# Patient Record
Sex: MALE | Race: ASIAN | NOT HISPANIC OR LATINO | Employment: STUDENT | URBAN - METROPOLITAN AREA
[De-identification: names, ages, dates, MRNs, and addresses within clinical notes are randomized per-mention and may not be internally consistent; named-entity substitution may affect disease eponyms.]

---
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
## 2022-05-27 ENCOUNTER — HOSPITAL ENCOUNTER (INPATIENT)
Facility: MEDICAL CENTER | Age: 22
LOS: 8 days | DRG: 834 | End: 2022-06-04
Admitting: HOSPITALIST
Payer: COMMERCIAL

## 2022-05-27 DIAGNOSIS — T45.1X5A CHEMOTHERAPY INDUCED NAUSEA AND VOMITING: ICD-10-CM

## 2022-05-27 DIAGNOSIS — C91.00 PRE B-CELL ACUTE LYMPHOBLASTIC LEUKEMIA (ALL) (HCC): ICD-10-CM

## 2022-05-27 DIAGNOSIS — I26.99 PE (PULMONARY THROMBOEMBOLISM) (HCC): ICD-10-CM

## 2022-05-27 DIAGNOSIS — C91.00 ACUTE LYMPHOBLASTIC LEUKEMIA IN ADULT (HCC): ICD-10-CM

## 2022-05-27 DIAGNOSIS — R11.2 CHEMOTHERAPY INDUCED NAUSEA AND VOMITING: ICD-10-CM

## 2022-05-27 PROBLEM — I82.890 SUPERFICIAL VEIN THROMBOSIS: Status: ACTIVE | Noted: 2022-05-27

## 2022-05-27 PROBLEM — E79.0 ELEVATED BLOOD URIC ACID LEVEL: Status: ACTIVE | Noted: 2022-05-27

## 2022-05-27 PROBLEM — M79.604 RIGHT LEG PAIN: Status: ACTIVE | Noted: 2022-05-27

## 2022-05-27 PROBLEM — M54.9 BACK PAIN: Status: ACTIVE | Noted: 2022-05-27

## 2022-05-27 LAB
ALBUMIN SERPL BCP-MCNC: 4.5 G/DL (ref 3.2–4.9)
ALBUMIN/GLOB SERPL: 1.7 G/DL
ALP SERPL-CCNC: 139 U/L (ref 30–99)
ALT SERPL-CCNC: 17 U/L (ref 2–50)
ANION GAP SERPL CALC-SCNC: 14 MMOL/L (ref 7–16)
APTT PPP: 35 SEC (ref 24.7–36)
AST SERPL-CCNC: 30 U/L (ref 12–45)
BASOPHILS # BLD AUTO: 0 % (ref 0–1.8)
BASOPHILS # BLD: 0 K/UL (ref 0–0.12)
BILIRUB SERPL-MCNC: 0.5 MG/DL (ref 0.1–1.5)
BUN SERPL-MCNC: 20 MG/DL (ref 8–22)
CALCIUM SERPL-MCNC: 9.4 MG/DL (ref 8.5–10.5)
CHLORIDE SERPL-SCNC: 100 MMOL/L (ref 96–112)
CO2 SERPL-SCNC: 24 MMOL/L (ref 20–33)
CREAT SERPL-MCNC: 1.35 MG/DL (ref 0.5–1.4)
EOSINOPHIL # BLD AUTO: 0 K/UL (ref 0–0.51)
EOSINOPHIL NFR BLD: 0 % (ref 0–6.9)
ERYTHROCYTE [DISTWIDTH] IN BLOOD BY AUTOMATED COUNT: 53.1 FL (ref 35.9–50)
FIBRINOGEN PPP-MCNC: 386 MG/DL (ref 215–460)
GFR SERPLBLD CREATININE-BSD FMLA CKD-EPI: 77 ML/MIN/1.73 M 2
GLOBULIN SER CALC-MCNC: 2.7 G/DL (ref 1.9–3.5)
GLUCOSE SERPL-MCNC: 107 MG/DL (ref 65–99)
HCT VFR BLD AUTO: 32.5 % (ref 42–52)
HGB BLD-MCNC: 10 G/DL (ref 14–18)
INR PPP: 1.37 (ref 0.87–1.13)
LDH SERPL L TO P-CCNC: 1114 U/L (ref 107–266)
LYMPHOCYTES # BLD AUTO: 75.31 K/UL (ref 1–4.8)
LYMPHOCYTES NFR BLD: 16.5 % (ref 22–41)
MAGNESIUM SERPL-MCNC: 2.4 MG/DL (ref 1.5–2.5)
MANUAL DIFF BLD: NORMAL
MCH RBC QN AUTO: 29 PG (ref 27–33)
MCHC RBC AUTO-ENTMCNC: 30.8 G/DL (ref 33.7–35.3)
MCV RBC AUTO: 94.2 FL (ref 81.4–97.8)
MONOCYTES # BLD AUTO: 17.34 K/UL (ref 0–0.85)
MONOCYTES NFR BLD AUTO: 3.8 % (ref 0–13.4)
MORPHOLOGY BLD-IMP: NORMAL
MYELOCYTES NFR BLD MANUAL: 0.8 %
NEUTROPHILS # BLD AUTO: 3.19 K/UL (ref 1.82–7.42)
NEUTROPHILS NFR BLD: 0.7 % (ref 44–72)
NRBC # BLD AUTO: 0.26 K/UL
NRBC BLD-RTO: 0.1 /100 WBC
PHOSPHATE SERPL-MCNC: 5.6 MG/DL (ref 2.5–4.5)
PLATELET # BLD AUTO: 53 K/UL (ref 164–446)
PLATELET BLD QL SMEAR: NORMAL
PMV BLD AUTO: 10.2 FL (ref 9–12.9)
POTASSIUM SERPL-SCNC: 3.6 MMOL/L (ref 3.6–5.5)
PROMYELOCYTES NFR BLD MANUAL: 0.8 %
PROT SERPL-MCNC: 7.2 G/DL (ref 6–8.2)
PROTHROMBIN TIME: 16.4 SEC (ref 12–14.6)
RBC # BLD AUTO: 3.45 M/UL (ref 4.7–6.1)
RBC BLD AUTO: NORMAL
SODIUM SERPL-SCNC: 138 MMOL/L (ref 135–145)
URATE SERPL-MCNC: 15.6 MG/DL (ref 2.5–8.3)
WBC # BLD AUTO: >440 K/UL (ref 4.8–10.8)
WBC OTHER NFR BLD MANUAL: 77.4 %

## 2022-05-27 PROCEDURE — 36415 COLL VENOUS BLD VENIPUNCTURE: CPT

## 2022-05-27 PROCEDURE — 85384 FIBRINOGEN ACTIVITY: CPT

## 2022-05-27 PROCEDURE — A9270 NON-COVERED ITEM OR SERVICE: HCPCS | Performed by: INTERNAL MEDICINE

## 2022-05-27 PROCEDURE — 700105 HCHG RX REV CODE 258: Performed by: INTERNAL MEDICINE

## 2022-05-27 PROCEDURE — 80053 COMPREHEN METABOLIC PANEL: CPT

## 2022-05-27 PROCEDURE — 85007 BL SMEAR W/DIFF WBC COUNT: CPT

## 2022-05-27 PROCEDURE — 700102 HCHG RX REV CODE 250 W/ 637 OVERRIDE(OP): Performed by: INTERNAL MEDICINE

## 2022-05-27 PROCEDURE — 80503 PATH CLIN CONSLTJ SF 5-20: CPT

## 2022-05-27 PROCEDURE — 85025 COMPLETE CBC W/AUTO DIFF WBC: CPT

## 2022-05-27 PROCEDURE — 83615 LACTATE (LD) (LDH) ENZYME: CPT

## 2022-05-27 PROCEDURE — 700111 HCHG RX REV CODE 636 W/ 250 OVERRIDE (IP): Performed by: INTERNAL MEDICINE

## 2022-05-27 PROCEDURE — 84550 ASSAY OF BLOOD/URIC ACID: CPT

## 2022-05-27 PROCEDURE — 770004 HCHG ROOM/CARE - ONCOLOGY PRIVATE *

## 2022-05-27 PROCEDURE — 85730 THROMBOPLASTIN TIME PARTIAL: CPT

## 2022-05-27 PROCEDURE — 99223 1ST HOSP IP/OBS HIGH 75: CPT | Performed by: INTERNAL MEDICINE

## 2022-05-27 PROCEDURE — 85610 PROTHROMBIN TIME: CPT

## 2022-05-27 PROCEDURE — 83735 ASSAY OF MAGNESIUM: CPT

## 2022-05-27 PROCEDURE — 84100 ASSAY OF PHOSPHORUS: CPT

## 2022-05-27 RX ORDER — BISACODYL 10 MG
10 SUPPOSITORY, RECTAL RECTAL
Status: DISCONTINUED | OUTPATIENT
Start: 2022-05-27 | End: 2022-06-04 | Stop reason: HOSPADM

## 2022-05-27 RX ORDER — HYDROMORPHONE HYDROCHLORIDE 1 MG/ML
.2-.4 INJECTION, SOLUTION INTRAMUSCULAR; INTRAVENOUS; SUBCUTANEOUS EVERY 4 HOURS PRN
Status: DISCONTINUED | OUTPATIENT
Start: 2022-05-27 | End: 2022-05-27

## 2022-05-27 RX ORDER — PROMETHAZINE HYDROCHLORIDE 25 MG/1
12.5-25 SUPPOSITORY RECTAL EVERY 4 HOURS PRN
Status: DISCONTINUED | OUTPATIENT
Start: 2022-05-27 | End: 2022-05-30 | Stop reason: ALTCHOICE

## 2022-05-27 RX ORDER — FEBUXOSTAT 40 MG/1
120 TABLET, FILM COATED ORAL DAILY
Status: DISCONTINUED | OUTPATIENT
Start: 2022-05-27 | End: 2022-05-31

## 2022-05-27 RX ORDER — ONDANSETRON 2 MG/ML
4 INJECTION INTRAMUSCULAR; INTRAVENOUS EVERY 4 HOURS PRN
Status: DISCONTINUED | OUTPATIENT
Start: 2022-05-27 | End: 2022-05-30

## 2022-05-27 RX ORDER — HEPARIN SODIUM 5000 [USP'U]/100ML
0-30 INJECTION, SOLUTION INTRAVENOUS CONTINUOUS
Status: DISCONTINUED | OUTPATIENT
Start: 2022-05-27 | End: 2022-05-28

## 2022-05-27 RX ORDER — ONDANSETRON 4 MG/1
4 TABLET, ORALLY DISINTEGRATING ORAL EVERY 4 HOURS PRN
Status: DISCONTINUED | OUTPATIENT
Start: 2022-05-27 | End: 2022-05-30 | Stop reason: ALTCHOICE

## 2022-05-27 RX ORDER — OXYCODONE HYDROCHLORIDE 5 MG/1
2.5 TABLET ORAL
Status: DISCONTINUED | OUTPATIENT
Start: 2022-05-27 | End: 2022-06-04 | Stop reason: HOSPADM

## 2022-05-27 RX ORDER — HYDRALAZINE HYDROCHLORIDE 20 MG/ML
10 INJECTION INTRAMUSCULAR; INTRAVENOUS EVERY 4 HOURS PRN
Status: DISCONTINUED | OUTPATIENT
Start: 2022-05-27 | End: 2022-06-04 | Stop reason: HOSPADM

## 2022-05-27 RX ORDER — SODIUM CHLORIDE 9 MG/ML
INJECTION, SOLUTION INTRAVENOUS CONTINUOUS
Status: DISCONTINUED | OUTPATIENT
Start: 2022-05-27 | End: 2022-05-29

## 2022-05-27 RX ORDER — PROCHLORPERAZINE EDISYLATE 5 MG/ML
5-10 INJECTION INTRAMUSCULAR; INTRAVENOUS EVERY 4 HOURS PRN
Status: DISCONTINUED | OUTPATIENT
Start: 2022-05-27 | End: 2022-05-30 | Stop reason: ALTCHOICE

## 2022-05-27 RX ORDER — AMOXICILLIN 250 MG
2 CAPSULE ORAL 2 TIMES DAILY
Status: DISCONTINUED | OUTPATIENT
Start: 2022-05-27 | End: 2022-06-04 | Stop reason: HOSPADM

## 2022-05-27 RX ORDER — HYDROXYUREA 500 MG/1
1000 CAPSULE ORAL 3 TIMES DAILY
Status: DISCONTINUED | OUTPATIENT
Start: 2022-05-27 | End: 2022-05-29

## 2022-05-27 RX ORDER — HYDROMORPHONE HYDROCHLORIDE 1 MG/ML
0.25 INJECTION, SOLUTION INTRAMUSCULAR; INTRAVENOUS; SUBCUTANEOUS
Status: DISCONTINUED | OUTPATIENT
Start: 2022-05-27 | End: 2022-06-04 | Stop reason: HOSPADM

## 2022-05-27 RX ORDER — OXYCODONE HYDROCHLORIDE 5 MG/1
5 TABLET ORAL
Status: DISCONTINUED | OUTPATIENT
Start: 2022-05-27 | End: 2022-06-04 | Stop reason: HOSPADM

## 2022-05-27 RX ORDER — SODIUM CHLORIDE 9 MG/ML
INJECTION, SOLUTION INTRAVENOUS CONTINUOUS
Status: ACTIVE | OUTPATIENT
Start: 2022-05-27 | End: 2022-05-28

## 2022-05-27 RX ORDER — PROMETHAZINE HYDROCHLORIDE 25 MG/1
12.5-25 TABLET ORAL EVERY 4 HOURS PRN
Status: DISCONTINUED | OUTPATIENT
Start: 2022-05-27 | End: 2022-05-30 | Stop reason: ALTCHOICE

## 2022-05-27 RX ORDER — HEPARIN SODIUM 5000 [USP'U]/100ML
0-30 INJECTION, SOLUTION INTRAVENOUS CONTINUOUS
Status: CANCELLED | OUTPATIENT
Start: 2022-05-28

## 2022-05-27 RX ORDER — POLYETHYLENE GLYCOL 3350 17 G/17G
1 POWDER, FOR SOLUTION ORAL
Status: DISCONTINUED | OUTPATIENT
Start: 2022-05-27 | End: 2022-06-04 | Stop reason: HOSPADM

## 2022-05-27 RX ORDER — HYDROMORPHONE HYDROCHLORIDE 1 MG/ML
0.4 INJECTION, SOLUTION INTRAMUSCULAR; INTRAVENOUS; SUBCUTANEOUS ONCE
Status: DISCONTINUED | OUTPATIENT
Start: 2022-05-27 | End: 2022-05-27

## 2022-05-27 RX ADMIN — SODIUM CHLORIDE 3 MG: 9 INJECTION, SOLUTION INTRAVENOUS at 22:06

## 2022-05-27 RX ADMIN — FEBUXOSTAT 120 MG: 40 TABLET, FILM COATED ORAL at 22:52

## 2022-05-27 RX ADMIN — SODIUM CHLORIDE: 9 INJECTION, SOLUTION INTRAVENOUS at 22:06

## 2022-05-27 RX ADMIN — OXYCODONE 2.5 MG: 5 TABLET ORAL at 22:18

## 2022-05-27 RX ADMIN — SODIUM CHLORIDE: 9 INJECTION, SOLUTION INTRAVENOUS at 19:00

## 2022-05-27 RX ADMIN — HYDROMORPHONE HYDROCHLORIDE 0.4 MG: 1 INJECTION, SOLUTION INTRAMUSCULAR; INTRAVENOUS; SUBCUTANEOUS at 18:08

## 2022-05-27 RX ADMIN — HYDROXYUREA 1000 MG: 500 CAPSULE ORAL at 23:20

## 2022-05-27 ASSESSMENT — COGNITIVE AND FUNCTIONAL STATUS - GENERAL
SUGGESTED CMS G CODE MODIFIER MOBILITY: CH
MOBILITY SCORE: 24
SUGGESTED CMS G CODE MODIFIER DAILY ACTIVITY: CH
DAILY ACTIVITIY SCORE: 24

## 2022-05-27 ASSESSMENT — PATIENT HEALTH QUESTIONNAIRE - PHQ9
SUM OF ALL RESPONSES TO PHQ9 QUESTIONS 1 AND 2: 0
1. LITTLE INTEREST OR PLEASURE IN DOING THINGS: NOT AT ALL
2. FEELING DOWN, DEPRESSED, IRRITABLE, OR HOPELESS: NOT AT ALL
9. THOUGHTS THAT YOU WOULD BE BETTER OFF DEAD, OR OF HURTING YOURSELF: NOT AT ALL
4. FEELING TIRED OR HAVING LITTLE ENERGY: NOT AT ALL
5. POOR APPETITE OR OVEREATING: NOT AT ALL
8. MOVING OR SPEAKING SO SLOWLY THAT OTHER PEOPLE COULD HAVE NOTICED. OR THE OPPOSITE, BEING SO FIGETY OR RESTLESS THAT YOU HAVE BEEN MOVING AROUND A LOT MORE THAN USUAL: NOT AT ALL
6. FEELING BAD ABOUT YOURSELF - OR THAT YOU ARE A FAILURE OR HAVE LET YOURSELF OR YOUR FAMILY DOWN: NOT AL ALL
7. TROUBLE CONCENTRATING ON THINGS, SUCH AS READING THE NEWSPAPER OR WATCHING TELEVISION: NOT AT ALL
SUM OF ALL RESPONSES TO PHQ QUESTIONS 1-9: 0
3. TROUBLE FALLING OR STAYING ASLEEP OR SLEEPING TOO MUCH: NOT AT ALL

## 2022-05-27 ASSESSMENT — LIFESTYLE VARIABLES
TOTAL SCORE: 0
HOW MANY TIMES IN THE PAST YEAR HAVE YOU HAD 5 OR MORE DRINKS IN A DAY: 0
CONSUMPTION TOTAL: NEGATIVE
EVER FELT BAD OR GUILTY ABOUT YOUR DRINKING: NO
TOTAL SCORE: 0
AVERAGE NUMBER OF DAYS PER WEEK YOU HAVE A DRINK CONTAINING ALCOHOL: 0
EVER HAD A DRINK FIRST THING IN THE MORNING TO STEADY YOUR NERVES TO GET RID OF A HANGOVER: NO
HAVE YOU EVER FELT YOU SHOULD CUT DOWN ON YOUR DRINKING: NO
ALCOHOL_USE: NO
TOTAL SCORE: 0
DOES PATIENT WANT TO STOP DRINKING: NO
ON A TYPICAL DAY WHEN YOU DRINK ALCOHOL HOW MANY DRINKS DO YOU HAVE: 0
HAVE PEOPLE ANNOYED YOU BY CRITICIZING YOUR DRINKING: NO

## 2022-05-27 ASSESSMENT — PAIN DESCRIPTION - PAIN TYPE: TYPE: ACUTE PAIN

## 2022-05-27 NOTE — LETTER
Physician Notification of Admission      To: Margarito Arvizu M.D.    [address]  [place] [address]    From: Pepe Faye M.D.    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 We strive to improve patient care by facilitating active communication with our medical colleagues from around the region.    To speak with a member of the patients care team, please contact the [facility] Pediatric department at [phone].   Thank you for allowing us to participate in the care of your patient.

## 2022-05-27 NOTE — PROGRESS NOTES
[facility] TRIAGE OFFICER DIRECT ADMISSION REPORT  Transferring facility: [facility]    Chief complaint: Left lower extremity pain and swelling  Pertinent history & patient course:   21-year-old male with no history of chronic medical problems presented with left lower extremity swelling to his PCPs office and referred to the emergency department.  Lower extremity duplex was negative for DVT.  He had low oxygen saturation 92% CTA done revealed pulmonary emboli.  CBC revealed WBC of 440,000.  Dr. Brennan from oncology consulted she thinks he has a LL and is recommending transfer to our facility for leukapheresis which is not available at the other facility.  Dr. Richards from nephrology was consulted and agrees with leukapheresis when patient arrives to our facility.  Pertinent imaging & lab results: ,000 platelets 48K serum creatinine 1.4  Further work up or recommendations per triage officer prior to transfer: Recommended placement of temporary HD catheter prior to transfer to facilitate initiation of leukapheresis upon arrival to our facility  Consultants called prior to transfer and pertinent input from consultants: Transferring provider discussed with Dr. Richards from nephrology and Dr. Melissa from oncology  Patient accepted for transfer: Yes  Consultants to be called upon arrival: Dr Richards, Dr Brennan  Admission status: Inpatient.   Floor requested: oncology      Please inform the triage officer upon arrival of the patient to [facility] for assignment of a hospitalist to perform admission.     For any question or concerns regarding the care of this patient, please reach out to the assigned hospitalist.

## 2022-05-28 ENCOUNTER — APPOINTMENT (OUTPATIENT)
Dept: RADIOLOGY | Facility: MEDICAL CENTER | Age: 22
DRG: 834 | End: 2022-05-28
Attending: NURSE PRACTITIONER
Payer: COMMERCIAL

## 2022-05-28 ENCOUNTER — APPOINTMENT (OUTPATIENT)
Dept: RADIOLOGY | Facility: MEDICAL CENTER | Age: 22
DRG: 834 | End: 2022-05-28
Attending: INTERNAL MEDICINE
Payer: COMMERCIAL

## 2022-05-28 PROBLEM — D70.9 NEUTROPENIC FEVER (HCC): Status: ACTIVE | Noted: 2022-05-28

## 2022-05-28 PROBLEM — R50.81 NEUTROPENIC FEVER (HCC): Status: ACTIVE | Noted: 2022-05-28

## 2022-05-28 PROBLEM — R00.0 TACHYCARDIA WITH HEART RATE 100-120 BEATS PER MINUTE: Status: ACTIVE | Noted: 2022-05-28

## 2022-05-28 LAB
ALBUMIN SERPL BCP-MCNC: 3.7 G/DL (ref 3.2–4.9)
ALBUMIN/GLOB SERPL: 1.5 G/DL
ALP SERPL-CCNC: 108 U/L (ref 30–99)
ALT SERPL-CCNC: 10 U/L (ref 2–50)
ANION GAP SERPL CALC-SCNC: 10 MMOL/L (ref 7–16)
ANION GAP SERPL CALC-SCNC: 16 MMOL/L (ref 7–16)
ANION GAP SERPL CALC-SCNC: 9 MMOL/L (ref 7–16)
ANION GAP SERPL CALC-SCNC: 9 MMOL/L (ref 7–16)
APPEARANCE UR: CLEAR
APTT PPP: 36.5 SEC (ref 24.7–36)
AST SERPL-CCNC: 26 U/L (ref 12–45)
BASOPHILS # BLD AUTO: 0 % (ref 0–1.8)
BASOPHILS # BLD AUTO: 0 % (ref 0–1.8)
BASOPHILS # BLD: 0 K/UL (ref 0–0.12)
BASOPHILS # BLD: 0 K/UL (ref 0–0.12)
BILIRUB SERPL-MCNC: 0.4 MG/DL (ref 0.1–1.5)
BILIRUB UR QL STRIP.AUTO: NEGATIVE
BLASTS NFR BLD MANUAL: 54.8 %
BUN SERPL-MCNC: 13 MG/DL (ref 8–22)
BUN SERPL-MCNC: 15 MG/DL (ref 8–22)
BUN SERPL-MCNC: 19 MG/DL (ref 8–22)
BUN SERPL-MCNC: 19 MG/DL (ref 8–22)
CALCIUM SERPL-MCNC: 8.2 MG/DL (ref 8.5–10.5)
CALCIUM SERPL-MCNC: 8.5 MG/DL (ref 8.5–10.5)
CALCIUM SERPL-MCNC: 8.6 MG/DL (ref 8.5–10.5)
CALCIUM SERPL-MCNC: 8.7 MG/DL (ref 8.5–10.5)
CHLORIDE SERPL-SCNC: 101 MMOL/L (ref 96–112)
CHLORIDE SERPL-SCNC: 102 MMOL/L (ref 96–112)
CHLORIDE SERPL-SCNC: 104 MMOL/L (ref 96–112)
CHLORIDE SERPL-SCNC: 107 MMOL/L (ref 96–112)
CO2 SERPL-SCNC: 18 MMOL/L (ref 20–33)
CO2 SERPL-SCNC: 22 MMOL/L (ref 20–33)
CO2 SERPL-SCNC: 23 MMOL/L (ref 20–33)
CO2 SERPL-SCNC: 23 MMOL/L (ref 20–33)
COLOR UR: YELLOW
CREAT SERPL-MCNC: 1.13 MG/DL (ref 0.5–1.4)
CREAT SERPL-MCNC: 1.19 MG/DL (ref 0.5–1.4)
CREAT SERPL-MCNC: 1.19 MG/DL (ref 0.5–1.4)
CREAT SERPL-MCNC: 1.22 MG/DL (ref 0.5–1.4)
EKG IMPRESSION: NORMAL
EOSINOPHIL # BLD AUTO: 0 K/UL (ref 0–0.51)
EOSINOPHIL # BLD AUTO: 0 K/UL (ref 0–0.51)
EOSINOPHIL NFR BLD: 0 % (ref 0–6.9)
EOSINOPHIL NFR BLD: 0 % (ref 0–6.9)
ERYTHROCYTE [DISTWIDTH] IN BLOOD BY AUTOMATED COUNT: 54.2 FL (ref 35.9–50)
ERYTHROCYTE [DISTWIDTH] IN BLOOD BY AUTOMATED COUNT: 54.8 FL (ref 35.9–50)
ERYTHROCYTE [DISTWIDTH] IN BLOOD BY AUTOMATED COUNT: 56.3 FL (ref 35.9–50)
FIBRINOGEN PPP-MCNC: 311 MG/DL (ref 215–460)
FIBRINOGEN PPP-MCNC: 330 MG/DL (ref 215–460)
GFR SERPLBLD CREATININE-BSD FMLA CKD-EPI: 87 ML/MIN/1.73 M 2
GFR SERPLBLD CREATININE-BSD FMLA CKD-EPI: 89 ML/MIN/1.73 M 2
GFR SERPLBLD CREATININE-BSD FMLA CKD-EPI: 89 ML/MIN/1.73 M 2
GFR SERPLBLD CREATININE-BSD FMLA CKD-EPI: 95 ML/MIN/1.73 M 2
GLOBULIN SER CALC-MCNC: 2.4 G/DL (ref 1.9–3.5)
GLUCOSE SERPL-MCNC: 103 MG/DL (ref 65–99)
GLUCOSE SERPL-MCNC: 111 MG/DL (ref 65–99)
GLUCOSE SERPL-MCNC: 114 MG/DL (ref 65–99)
GLUCOSE SERPL-MCNC: 96 MG/DL (ref 65–99)
GLUCOSE UR STRIP.AUTO-MCNC: NEGATIVE MG/DL
HAV IGM SERPL QL IA: NORMAL
HBV CORE IGM SER QL: NORMAL
HBV SURFACE AB SERPL IA-ACNC: <3.5 MIU/ML (ref 0–10)
HBV SURFACE AG SER QL: NORMAL
HCT VFR BLD AUTO: 28.1 % (ref 42–52)
HCT VFR BLD AUTO: 28.6 % (ref 42–52)
HCT VFR BLD AUTO: 34.7 % (ref 42–52)
HCV AB SER QL: NORMAL
HGB BLD-MCNC: 10.7 G/DL (ref 14–18)
HGB BLD-MCNC: 8.6 G/DL (ref 14–18)
HGB BLD-MCNC: 8.7 G/DL (ref 14–18)
INR PPP: 1.32 (ref 0.87–1.13)
KETONES UR STRIP.AUTO-MCNC: 15 MG/DL
LEUKOCYTE ESTERASE UR QL STRIP.AUTO: NEGATIVE
LYMPHOCYTES # BLD AUTO: 104.79 K/UL (ref 1–4.8)
LYMPHOCYTES # BLD AUTO: 147.82 K/UL (ref 1–4.8)
LYMPHOCYTES NFR BLD: 38 % (ref 22–41)
LYMPHOCYTES NFR BLD: 41.7 % (ref 22–41)
MANUAL DIFF BLD: ABNORMAL
MANUAL DIFF BLD: NORMAL
MCH RBC QN AUTO: 28.9 PG (ref 27–33)
MCH RBC QN AUTO: 29.2 PG (ref 27–33)
MCH RBC QN AUTO: 29.5 PG (ref 27–33)
MCHC RBC AUTO-ENTMCNC: 30.4 G/DL (ref 33.7–35.3)
MCHC RBC AUTO-ENTMCNC: 30.6 G/DL (ref 33.7–35.3)
MCHC RBC AUTO-ENTMCNC: 30.8 G/DL (ref 33.7–35.3)
MCV RBC AUTO: 94.3 FL (ref 81.4–97.8)
MCV RBC AUTO: 95.6 FL (ref 81.4–97.8)
MCV RBC AUTO: 96 FL (ref 81.4–97.8)
METAMYELOCYTES NFR BLD MANUAL: 0.8 %
MICRO URNS: ABNORMAL
MONOCYTES # BLD AUTO: 3.11 K/UL (ref 0–0.85)
MONOCYTES # BLD AUTO: 4.27 K/UL (ref 0–0.85)
MONOCYTES NFR BLD AUTO: 0.8 % (ref 0–13.4)
MONOCYTES NFR BLD AUTO: 1.7 % (ref 0–13.4)
MORPHOLOGY BLD-IMP: NORMAL
MORPHOLOGY BLD-IMP: NORMAL
MYELOCYTES NFR BLD MANUAL: 0.8 %
NEUTROPHILS # BLD AUTO: 0 K/UL (ref 1.82–7.42)
NEUTROPHILS # BLD AUTO: 4.52 K/UL (ref 1.82–7.42)
NEUTROPHILS NFR BLD: 0 % (ref 44–72)
NEUTROPHILS NFR BLD: 1.8 % (ref 44–72)
NITRITE UR QL STRIP.AUTO: NEGATIVE
NRBC # BLD AUTO: 0.16 K/UL
NRBC # BLD AUTO: 0.26 K/UL
NRBC BLD-RTO: 0.1 /100 WBC
NRBC BLD-RTO: 0.1 /100 WBC
PATH REV: NORMAL
PATH REV: NORMAL
PATHOLOGY CONSULT NOTE: NORMAL
PH UR STRIP.AUTO: 5.5 [PH] (ref 5–8)
PHOSPHATE SERPL-MCNC: 3.4 MG/DL (ref 2.5–4.5)
PHOSPHATE SERPL-MCNC: 3.9 MG/DL (ref 2.5–4.5)
PHOSPHATE SERPL-MCNC: 3.9 MG/DL (ref 2.5–4.5)
PLATELET # BLD AUTO: 31 K/UL (ref 164–446)
PLATELET # BLD AUTO: 41 K/UL (ref 164–446)
PLATELET # BLD AUTO: 46 K/UL (ref 164–446)
PLATELET BLD QL SMEAR: NORMAL
PLATELET BLD QL SMEAR: NORMAL
PMV BLD AUTO: 10.1 FL (ref 9–12.9)
PMV BLD AUTO: 8.3 FL (ref 9–12.9)
PMV BLD AUTO: 9 FL (ref 9–12.9)
POTASSIUM SERPL-SCNC: 4.4 MMOL/L (ref 3.6–5.5)
POTASSIUM SERPL-SCNC: 4.7 MMOL/L (ref 3.6–5.5)
POTASSIUM SERPL-SCNC: 4.8 MMOL/L (ref 3.6–5.5)
POTASSIUM SERPL-SCNC: 5 MMOL/L (ref 3.6–5.5)
PROMYELOCYTES NFR BLD MANUAL: 1.7 %
PROT SERPL-MCNC: 6.1 G/DL (ref 6–8.2)
PROT UR QL STRIP: NEGATIVE MG/DL
PROTHROMBIN TIME: 16 SEC (ref 12–14.6)
RBC # BLD AUTO: 2.98 M/UL (ref 4.7–6.1)
RBC # BLD AUTO: 2.98 M/UL (ref 4.7–6.1)
RBC # BLD AUTO: 3.63 M/UL (ref 4.7–6.1)
RBC BLD AUTO: NORMAL
RBC BLD AUTO: NORMAL
RBC UR QL AUTO: NEGATIVE
SODIUM SERPL-SCNC: 134 MMOL/L (ref 135–145)
SODIUM SERPL-SCNC: 134 MMOL/L (ref 135–145)
SODIUM SERPL-SCNC: 138 MMOL/L (ref 135–145)
SODIUM SERPL-SCNC: 138 MMOL/L (ref 135–145)
SP GR UR STRIP.AUTO: 1.01
UFH PPP CHRO-ACNC: <0.1 IU/ML
UFH PPP CHRO-ACNC: <0.1 IU/ML
URATE SERPL-MCNC: 3.4 MG/DL (ref 2.5–8.3)
URATE SERPL-MCNC: 5.2 MG/DL (ref 2.5–8.3)
UROBILINOGEN UR STRIP.AUTO-MCNC: 0.2 MG/DL
WBC # BLD AUTO: 251.3 K/UL (ref 4.8–10.8)
WBC # BLD AUTO: 389 K/UL (ref 4.8–10.8)
WBC # BLD AUTO: 409.5 K/UL (ref 4.8–10.8)
WBC OTHER NFR BLD MANUAL: 57.9 %

## 2022-05-28 PROCEDURE — 71045 X-RAY EXAM CHEST 1 VIEW: CPT

## 2022-05-28 PROCEDURE — 36415 COLL VENOUS BLD VENIPUNCTURE: CPT

## 2022-05-28 PROCEDURE — 85730 THROMBOPLASTIN TIME PARTIAL: CPT

## 2022-05-28 PROCEDURE — 88377 M/PHMTRC ALYS ISHQUANT/SEMIQ: CPT

## 2022-05-28 PROCEDURE — 88185 FLOWCYTOMETRY/TC ADD-ON: CPT | Mod: 91

## 2022-05-28 PROCEDURE — 87040 BLOOD CULTURE FOR BACTERIA: CPT

## 2022-05-28 PROCEDURE — C1769 GUIDE WIRE: HCPCS

## 2022-05-28 PROCEDURE — 84550 ASSAY OF BLOOD/URIC ACID: CPT | Mod: 91

## 2022-05-28 PROCEDURE — 700111 HCHG RX REV CODE 636 W/ 250 OVERRIDE (IP): Performed by: INTERNAL MEDICINE

## 2022-05-28 PROCEDURE — 36511 APHERESIS WBC: CPT

## 2022-05-28 PROCEDURE — 84100 ASSAY OF PHOSPHORUS: CPT | Mod: 91

## 2022-05-28 PROCEDURE — 6A550Z1 PHERESIS OF LEUKOCYTES, SINGLE: ICD-10-PCS | Performed by: PEDIATRICS

## 2022-05-28 PROCEDURE — 88374 M/PHMTRC ALYS ISHQUANT/SEMIQ: CPT | Mod: 91

## 2022-05-28 PROCEDURE — A9270 NON-COVERED ITEM OR SERVICE: HCPCS | Performed by: NURSE PRACTITIONER

## 2022-05-28 PROCEDURE — 81003 URINALYSIS AUTO W/O SCOPE: CPT

## 2022-05-28 PROCEDURE — 85025 COMPLETE CBC W/AUTO DIFF WBC: CPT

## 2022-05-28 PROCEDURE — 85384 FIBRINOGEN ACTIVITY: CPT | Mod: 91

## 2022-05-28 PROCEDURE — 700111 HCHG RX REV CODE 636 W/ 250 OVERRIDE (IP): Performed by: NURSE PRACTITIONER

## 2022-05-28 PROCEDURE — 700111 HCHG RX REV CODE 636 W/ 250 OVERRIDE (IP)

## 2022-05-28 PROCEDURE — 86706 HEP B SURFACE ANTIBODY: CPT

## 2022-05-28 PROCEDURE — 700105 HCHG RX REV CODE 258: Performed by: INTERNAL MEDICINE

## 2022-05-28 PROCEDURE — 93005 ELECTROCARDIOGRAM TRACING: CPT | Performed by: NURSE PRACTITIONER

## 2022-05-28 PROCEDURE — 85520 HEPARIN ASSAY: CPT | Mod: 91

## 2022-05-28 PROCEDURE — A9270 NON-COVERED ITEM OR SERVICE: HCPCS | Performed by: INTERNAL MEDICINE

## 2022-05-28 PROCEDURE — 80074 ACUTE HEPATITIS PANEL: CPT

## 2022-05-28 PROCEDURE — 700105 HCHG RX REV CODE 258: Performed by: NURSE PRACTITIONER

## 2022-05-28 PROCEDURE — 85610 PROTHROMBIN TIME: CPT

## 2022-05-28 PROCEDURE — 80048 BASIC METABOLIC PNL TOTAL CA: CPT | Mod: 91

## 2022-05-28 PROCEDURE — 770020 HCHG ROOM/CARE - TELE (206)

## 2022-05-28 PROCEDURE — 700101 HCHG RX REV CODE 250

## 2022-05-28 PROCEDURE — 700102 HCHG RX REV CODE 250 W/ 637 OVERRIDE(OP): Performed by: NURSE PRACTITIONER

## 2022-05-28 PROCEDURE — 99233 SBSQ HOSP IP/OBS HIGH 50: CPT | Performed by: NURSE PRACTITIONER

## 2022-05-28 PROCEDURE — 80053 COMPREHEN METABOLIC PANEL: CPT

## 2022-05-28 PROCEDURE — 85007 BL SMEAR W/DIFF WBC COUNT: CPT | Mod: 91

## 2022-05-28 PROCEDURE — 93010 ELECTROCARDIOGRAM REPORT: CPT | Performed by: INTERNAL MEDICINE

## 2022-05-28 PROCEDURE — 88184 FLOWCYTOMETRY/ TC 1 MARKER: CPT

## 2022-05-28 PROCEDURE — 02HV33Z INSERTION OF INFUSION DEVICE INTO SUPERIOR VENA CAVA, PERCUTANEOUS APPROACH: ICD-10-PCS | Performed by: RADIOLOGY

## 2022-05-28 PROCEDURE — 700102 HCHG RX REV CODE 250 W/ 637 OVERRIDE(OP): Performed by: INTERNAL MEDICINE

## 2022-05-28 PROCEDURE — 85027 COMPLETE CBC AUTOMATED: CPT

## 2022-05-28 RX ORDER — ACETAMINOPHEN 325 MG/1
650 TABLET ORAL EVERY 4 HOURS PRN
Status: DISCONTINUED | OUTPATIENT
Start: 2022-05-28 | End: 2022-06-04 | Stop reason: HOSPADM

## 2022-05-28 RX ORDER — ONDANSETRON 2 MG/ML
4 INJECTION INTRAMUSCULAR; INTRAVENOUS EVERY 6 HOURS PRN
Status: ACTIVE | OUTPATIENT
Start: 2022-05-28 | End: 2022-05-28

## 2022-05-28 RX ORDER — HEPARIN SODIUM (PORCINE) LOCK FLUSH IV SOLN 100 UNIT/ML 100 UNIT/ML
SOLUTION INTRAVENOUS
Status: COMPLETED
Start: 2022-05-28 | End: 2022-05-28

## 2022-05-28 RX ORDER — CALCIUM GLUCONATE 94 MG/ML
4 INJECTION, SOLUTION INTRAVENOUS ONCE
Status: COMPLETED | OUTPATIENT
Start: 2022-05-28 | End: 2022-05-28

## 2022-05-28 RX ORDER — ECHINACEA PURPUREA EXTRACT 125 MG
2 TABLET ORAL
Status: DISCONTINUED | OUTPATIENT
Start: 2022-05-28 | End: 2022-06-04 | Stop reason: HOSPADM

## 2022-05-28 RX ORDER — SODIUM CHLORIDE 9 MG/ML
500 INJECTION, SOLUTION INTRAVENOUS
Status: ACTIVE | OUTPATIENT
Start: 2022-05-28 | End: 2022-05-28

## 2022-05-28 RX ORDER — HEPARIN SODIUM 5000 [USP'U]/100ML
0-30 INJECTION, SOLUTION INTRAVENOUS CONTINUOUS
Status: DISCONTINUED | OUTPATIENT
Start: 2022-05-28 | End: 2022-05-31

## 2022-05-28 RX ORDER — MIDAZOLAM HYDROCHLORIDE 1 MG/ML
INJECTION INTRAMUSCULAR; INTRAVENOUS
Status: COMPLETED
Start: 2022-05-28 | End: 2022-05-28

## 2022-05-28 RX ORDER — MIDAZOLAM HYDROCHLORIDE 1 MG/ML
.5-2 INJECTION INTRAMUSCULAR; INTRAVENOUS PRN
Status: ACTIVE | OUTPATIENT
Start: 2022-05-28 | End: 2022-05-28

## 2022-05-28 RX ORDER — HEPARIN SODIUM 5000 [USP'U]/100ML
0-30 INJECTION, SOLUTION INTRAVENOUS CONTINUOUS
Status: DISCONTINUED | OUTPATIENT
Start: 2022-05-28 | End: 2022-05-28

## 2022-05-28 RX ORDER — DEXTROSE MONOHYDRATE, SODIUM CITRATE, AND CITRIC ACID MONOHYDRATE 2.45; 2.2; .8 G/100ML; G/100ML; G/100ML
INJECTION, SOLUTION INTRAVENOUS
Status: COMPLETED
Start: 2022-05-28 | End: 2022-05-28

## 2022-05-28 RX ADMIN — FENTANYL CITRATE 50 MCG: 50 INJECTION, SOLUTION INTRAMUSCULAR; INTRAVENOUS at 08:48

## 2022-05-28 RX ADMIN — ACETAMINOPHEN 650 MG: 325 TABLET ORAL at 18:25

## 2022-05-28 RX ADMIN — CEFEPIME 2 G: 2 INJECTION, POWDER, FOR SOLUTION INTRAVENOUS at 21:57

## 2022-05-28 RX ADMIN — OXYCODONE 5 MG: 5 TABLET ORAL at 12:35

## 2022-05-28 RX ADMIN — MIDAZOLAM HYDROCHLORIDE 1 MG: 1 INJECTION INTRAMUSCULAR; INTRAVENOUS at 08:48

## 2022-05-28 RX ADMIN — SODIUM CHLORIDE: 9 INJECTION, SOLUTION INTRAVENOUS at 02:51

## 2022-05-28 RX ADMIN — CALCIUM GLUCONATE 4 G: 98 INJECTION, SOLUTION INTRAVENOUS at 16:50

## 2022-05-28 RX ADMIN — HYDROXYUREA 1000 MG: 500 CAPSULE ORAL at 06:21

## 2022-05-28 RX ADMIN — OXYCODONE 5 MG: 5 TABLET ORAL at 02:57

## 2022-05-28 RX ADMIN — CEFEPIME 2 G: 2 INJECTION, POWDER, FOR SOLUTION INTRAVENOUS at 16:18

## 2022-05-28 RX ADMIN — OXYCODONE 5 MG: 5 TABLET ORAL at 16:18

## 2022-05-28 RX ADMIN — DEXTROSE MONOHYDRATE, SODIUM CITRATE, AND CITRIC ACID MONOHYDRATE 632 ML: 2.45; 2.2; .8 INJECTION, SOLUTION INTRAVENOUS at 15:04

## 2022-05-28 RX ADMIN — HYDROXYUREA 1000 MG: 500 CAPSULE ORAL at 16:19

## 2022-05-28 RX ADMIN — MIDAZOLAM HYDROCHLORIDE 1 MG: 1 INJECTION, SOLUTION INTRAMUSCULAR; INTRAVENOUS at 08:48

## 2022-05-28 RX ADMIN — HEPARIN SODIUM 18 UNITS/KG/HR: 5000 INJECTION, SOLUTION INTRAVENOUS at 18:25

## 2022-05-28 RX ADMIN — HEPARIN 3.7 ML: 100 SYRINGE at 08:50

## 2022-05-28 RX ADMIN — HEPARIN SODIUM 18 UNITS/KG/HR: 5000 INJECTION, SOLUTION INTRAVENOUS at 03:32

## 2022-05-28 RX ADMIN — SODIUM CHLORIDE: 9 INJECTION, SOLUTION INTRAVENOUS at 06:51

## 2022-05-28 RX ADMIN — ACETAMINOPHEN 650 MG: 325 TABLET ORAL at 09:48

## 2022-05-28 RX ADMIN — HYDROXYUREA 1000 MG: 500 CAPSULE ORAL at 21:57

## 2022-05-28 ASSESSMENT — ENCOUNTER SYMPTOMS
ABDOMINAL PAIN: 0
EYES NEGATIVE: 1
NECK PAIN: 0
SHORTNESS OF BREATH: 1
BACK PAIN: 1
WEIGHT LOSS: 0
CONSTIPATION: 0
RESPIRATORY NEGATIVE: 1
BLURRED VISION: 0
HEADACHES: 0
PALPITATIONS: 1
FLANK PAIN: 0
BRUISES/BLEEDS EASILY: 0
DOUBLE VISION: 0
SORE THROAT: 0
MYALGIAS: 1
GASTROINTESTINAL NEGATIVE: 1
FEVER: 1
WHEEZING: 0
FEVER: 0
DEPRESSION: 0
HEMOPTYSIS: 0
SPUTUM PRODUCTION: 0
SENSORY CHANGE: 0
FOCAL WEAKNESS: 0
WEAKNESS: 0
NAUSEA: 0
CHILLS: 1
SHORTNESS OF BREATH: 0
STRIDOR: 0
COUGH: 0
PALPITATIONS: 0
CHILLS: 0
PSYCHIATRIC NEGATIVE: 1
DIARRHEA: 0
NERVOUS/ANXIOUS: 0
INSOMNIA: 0
NEUROLOGICAL NEGATIVE: 1
DIZZINESS: 0
VOMITING: 0

## 2022-05-28 NOTE — CARE PLAN
Problem: Nutritional:  Goal: Achieve adequate nutritional intake  Description: Patient will consume >50-75% of meals  Outcome: Not Met     See RD note

## 2022-05-28 NOTE — CARE PLAN
The patient is Watcher - Medium risk of patient condition declining or worsening    Shift Goals  Clinical Goals: pain control/start rasburicase  Patient Goals: pain control  Family Goals: figure out POC    Progress made toward(s) clinical / shift goals:    Problem: Knowledge Deficit - Standard  Goal: Patient and family/care givers will demonstrate understanding of plan of care, disease process/condition, diagnostic tests and medications  Outcome: Progressing     Problem: Pain - Standard  Goal: Alleviation of pain or a reduction in pain to the patient’s comfort goal  Outcome: Progressing       Patient is not progressing towards the following goals:

## 2022-05-28 NOTE — PROGRESS NOTES
Patient brought to IR for Temporary Dialysis Catheter. Patient AAOx4, respirations even and unlabored. Dr. Linder at bedside with consent. Patient assisted to bed and attached to NiBP, O2, SpO2, Co2, and EKG.     0841 Time out done for procedure to begin    0846 MD aware of tachycardia 156 AFIB new onset    0848 1 mg of Versed and 50 mcg of Fentanyl given IVP     0855 line in place    Called burt Stanford back to room. Patient transport with RN at bedside.     0903 rapid called    0905 rapid responded    0912 working on TELE bed    0918 rapid took patient to T837    [facility]--Mag--Acute Dual Lumen Catheter  10 fr 15 cm  LOT: [id number]  REF: [id number]  EXP: 09.25.24

## 2022-05-28 NOTE — ASSESSMENT & PLAN NOTE
Unclear if secondary to leukostasis versus pulmonary embolism.  Discussed at length with oncologist recommending modified IV heparin.  No bolus or rebolus.  Follow-up fibrinogen and coags.

## 2022-05-28 NOTE — ASSESSMENT & PLAN NOTE
Started 5/28  Cefepime for 7 days, may need to extend therapy  Chest x-ray showed no acute cardiopulmonary process  UA, blood cultures pending  Tylenol

## 2022-05-28 NOTE — ASSESSMENT & PLAN NOTE
Higher rate and palpitations during catheter placement, Asymptomatic after catheter placement completed.  Normal sinus rhythm on EKG  Echocardiogram pending  Telemetry monitoring

## 2022-05-28 NOTE — PROGRESS NOTES
Ney from lab called with WBC of 456.4. Lab read back to Ney. Notified Dr Brennan of WBC on voalte. MD read message.

## 2022-05-28 NOTE — PROGRESS NOTES
Oncology/Hematology Progress Note               Author: Liliam Brennan M.D. Date & Time created: 5/28/2022  10:29 AM     CC: Acute Leukemia, leukocytosis    Interval History:  Pt went down for line placement this morning and developed tachycardia and so now was transferred to telemetry. He feels about the same. Still exquisitely tender to touch in the RLE. No SOB or cough. Febrile on transfer to telemetry. He is fatigued. His mother and aunt are at the bedside.      Review of Systems:  Review of Systems   Constitutional: Positive for fever and malaise/fatigue.   HENT: Negative.    Eyes: Negative.    Respiratory: Negative.    Cardiovascular: Positive for palpitations. Negative for chest pain.   Gastrointestinal: Negative.    Genitourinary: Negative.    Musculoskeletal: Positive for myalgias.   Skin: Negative.    Neurological: Negative.    Endo/Heme/Allergies: Negative.    Psychiatric/Behavioral: Negative.        Physical Exam:  Physical Exam  Vitals reviewed.   Constitutional:       General: He is not in acute distress.     Appearance: He is normal weight. He is ill-appearing.   HENT:      Head: Normocephalic and atraumatic.      Right Ear: External ear normal.      Left Ear: External ear normal.      Nose: Nose normal.   Eyes:      General: No scleral icterus.     Conjunctiva/sclera: Conjunctivae normal.   Cardiovascular:      Rate and Rhythm: Regular rhythm. Tachycardia present.   Pulmonary:      Effort: Pulmonary effort is normal. No respiratory distress.      Breath sounds: Normal breath sounds.   Abdominal:      General: Bowel sounds are normal.   Musculoskeletal:         General: Tenderness (RLE) present. No swelling.   Skin:     General: Skin is warm and dry.      Coloration: Skin is not jaundiced.   Neurological:      General: No focal deficit present.      Mental Status: He is alert and oriented to person, place, and time.   Psychiatric:         Mood and Affect: Mood normal.         Behavior: Behavior  normal.         Thought Content: Thought content normal.         Judgment: Judgment normal.         Labs:          Recent Labs     22  18022  0009   SODIUM 138 134*  134*   POTASSIUM 3.6 4.7  5.0   CHLORIDE 100 101  102   CO2 24 23  23   BUN 20 19  19   CREATININE 1.35 1.22  1.19   MAGNESIUM 2.4  --    PHOSPHORUS 5.6* 3.9   CALCIUM 9.4 8.5  8.2*     Recent Labs     22  18022  0009   ALTSGPT 17 10   ASTSGOT 30 26   ALKPHOSPHAT 139* 108*   TBILIRUBIN 0.5 0.4   GLUCOSE 107* 114*  111*     Recent Labs     22  0009   RBC 3.45* 2.98*   HEMOGLOBIN 10.0* 8.6*   HEMATOCRIT 32.5* 28.1*   PLATELETCT 53* 41*   PROTHROMBTM 16.4* 16.0*   APTT 35.0 36.5*   INR 1.37* 1.32*     Recent Labs     22  0009   WBC >440.0* 389.0*   NEUTSPOLYS 0.70* 0.00*   LYMPHOCYTES 16.50* 38.00   MONOCYTES 3.80 0.80   EOSINOPHILS 0.00 0.00   BASOPHILS 0.00 0.00   ASTSGOT 30 26   ALTSGPT 17 10   ALKPHOSPHAT 139* 108*   TBILIRUBIN 0.5 0.4     Recent Labs     22  0009   SODIUM 138 134*  134*   POTASSIUM 3.6 4.7  5.0   CHLORIDE 100 101  102   CO2 24 23  23   GLUCOSE 107* 114*  111*   BUN 20 19  19   CREATININE 1.35 1.22  1.19   CALCIUM 9.4 8.5  8.2*     Hemodynamics:  Temp (24hrs), Av.3 °C (99.2 °F), Min:36.6 °C (97.8 °F), Max:38.5 °C (101.3 °F)  Temperature: (!) 38.5 °C (101.3 °F)  Pulse  Av.6  Min: 96  Max: 145   Blood Pressure: 125/73     Respiratory:    Respiration: 20, Pulse Oximetry: 96 %     Work Of Breathing / Effort: Tachypnea     Fluids:    Intake/Output Summary (Last 24 hours) at 2022 1029  Last data filed at 2022 0651  Gross per 24 hour   Intake --   Output 800 ml   Net -800 ml     Weight: 74.4 kg (164 lb 0.4 oz)  GI/Nutrition:  Orders Placed This Encounter   Procedures   • Diet Order Diet: Regular (Encourage PO fluids)     Standing Status:   Standing     Number of Occurrences:   1     Order Specific Question:   Diet:      Answer:   Regular [1]     Comments:   Encourage PO fluids     Medical Decision Making, by Problem:  Active Hospital Problems    Diagnosis    • *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 Lab work reviewed an extreme leukocytosis, anemia, and thrombocytopenia.     # Acute Leukemia  - review of peripheral smear shows numerous blasts, not granular, minimal cytoplasm most concerning for lymphoblasts.  - Pt needs a bone marrow biopsy whoever at [facility] there is no staff in histology over the weekend to perform this biopsy, I reviewed with the patient.  - In the meantime, I did send Peripheral blood flow cytometry as he has numerous blasts in the peripheral blood. I also sent BCR-ABL and t(15;17) on there peripheral blood. Per pathology, should have flow results back tomorrow. Will obtain bone marrow biopsy as soon as able to.  - Currently on hydroxyurea with some response int he WBC. Continue Hydroxyurea and monitor labs q 8 hours.   - Coags and fibrinogen on admit show no evidence of DIC.    # Leukostasis  - WBC > 440 and this was dilutional, He has signs/symtpoms of leukostasis that requires urgent management.  - He is on hydroxyurea and plans for leukopheresis as diagnostic testing will take time to result prior to starting definitive therapy.  - Reviewed the procedure with pt and family. He had catheter placed this morning. Nephrology consulted and coordinating this procedure.     # Anemia/Thrombocytopenia  - 2/2 acute leukemia  - monitor CBC     # Tumor Lysis Syndrome  - his uric acid on admission was 15.6 with mild JACOBY at  outside hospital with a creatinine of 1.4  - phosphorous was also elevated on admit at 5.6. Potassium and calcium normal ranges although calcium has decreased this morning to 8.2  - He is on aggressive IVF hydration  - He received rasburicase and started on uloric  - Monitoring TLS labs q 8 hours, reviewed with RN to be very strict about these monitoring parameters.    # Neutropenic Fever  - after procedure spiked a temp to 101.5, he has 0 neutrophils and all blasts in peripheral blood  - BC x 2, CXR, UA  - Start Cefepime IV q 8 hours    # Subsegmental PE's and Superficial DVT   - diagnosed at [facility] by CTA chest and u/s LE  - he is quite symptomatic from the superficial DVT  - started heparin gtt cautiously given thrombocytopenia, ideally platelets should remain above 40 to continue. However do not want to transfuse platelets to keep him above 40 given leukostasis.   - pain management for right leg pain    # Family Meeting  - I had a long discussion today with the patient, his mother, and his aunt who was at the bedside. I reviewed all the current diagnostic information. I reviewed that the early days of this type of diagnosis are challenging and his clinical status can change quickly. I reviewed that I will keep them up to date as frequency as needed and to not hesitate to reach out to the nurse if they would like to speak with me in between. Pt and family understood and were appreciative of the care.    Quality-Core Measures   Reviewed items::  EKG reviewed, Labs reviewed, Medications reviewed and Radiology images reviewed  Baer catheter::  No Baer  DVT prophylaxis pharmacological::  Heparin    High Medical Complexity, High Risk Patient    Will Continue to Follow up, please call [phone] with any questions at anytime.    Liliam Brennan MD  [facility]  [phone]

## 2022-05-28 NOTE — DIETARY
"Nutrition services: Day 1 of admit.  Tomas Jean-Baptiste is a 21 y.o. male with admitting DX of acute lymphoblastic leukemia in adult. Current hospital problems include: neutropenic fever, tachycardia, PE, right leg pain, back pain, superficial vein thrombosis and elevated blood uric acid level.     Consult received for unintentional wt loss of 2-13 lbs in <1 week due to decreased appetite (MST 2). RD attempted to visit pt x2 however on both attempts pt was with other staff. RD to follow up for pt preferences and wt hx as able.       Assessment:  Height: 165.1 cm (5' 5\")  Weight: 74.4 kg (164 lb 0.4 oz) via standing scale   Body mass index is 27.29 kg/m²., BMI classification: overweight   Diet/Intake: Regular. No intake yet recorded.     Evaluation:   1. Pt with reported wt loss, no wt hx in chart and RD unable to interview pt. Unable to assess wt loss trend at this time.   2. RD added greek yogurt and snacks with meals to encourage PO while in acute care.   3. Current clinical picture and MD progress notes reviewed.   4. Labs reviewed   5. Meds: NS @ 125 mL/hr, oxycodone,   6. Skin: no noted pressure injuries  7. +BM 5/26    Malnutrition Risk: Unable to fully assess.     Recommendations/Plan:  1. Regular diet as tolerated  2. Addition of snacks to meal trays to encourage PO and bolster nutrition   3. RD to interview for preferences and wt hx as able   4. Encourage intake of all meals and snacks  5. Document intake of all meals and snacks  as % taken in ADL's to provide interdisciplinary communication across all shifts.   6. Monitor weight.  7. Nutrition rep will continue to see patient for ongoing meal and snack preferences.     RD following         "

## 2022-05-28 NOTE — PROGRESS NOTES
Patient arrived to floor with RRT on zoll, patient alert and oriented with complaints of neck discomfort and leg discomfort. Patient placed on tele box found to be in -120, BP stable but febrile. Provider notified for tylenol.

## 2022-05-28 NOTE — PROGRESS NOTES
Hospital Medicine Daily Progress Note    Date of Service  5/28/2022    Chief Complaint  Tomas Jean-Baptiste is a 21 y.o. male admitted 5/27/2022 with left lower extremity pain and abnormal labs, transferred from [facility]  Tomas Jean-Baptiste is a 21 y.o. male who presented 5/27/2022 via transfer from [facility].  He was initially admitted for work-up of left lower extremity pain and swelling found to have superficial DVT, bilateral subsegmental pulmonary embolism and a white blood cell count of 440,000.  Dr. Brennan from oncology consulted recommended transfer to [facility] for ALL needing temporary hemodialysis catheter placement and leukapheresis which is not available at the other facility.    On admission it was reported the patient had right lower extremity pain.  He described several weeks of generalized itching, subjective fever and chills and low back pain.  He has had an intermittent bloody nose from his right nare over the last couple of weeks.  He denies any chronic medical history or current medication use.    Interval Problem Update  Today the patient is lying in bed in apparent mild discomfort.  His mother and aunt are at bedside.  He continues to have very painful and tender right lower extremity from his knee to his ankle, worsened by ambulation and palpation. He feels tired. He felt palpitations during a tunneled catheter placement this morning, however those have since resolved. His last nosebleed was a day prior to admission and stopped after 2 tissues.  He denies headache, fever, chills, dizziness, shortness of breath, cough and pain anywhere else.   -Temporary dialysis catheter placed this morning in IR.  Patient experienced tachycardia during the procedure and was subsequently transferred to telemetry unit.  Tachycardia has since resolved, however patient is now febrile.  -Transfer to telemetry floor  -normal sinus rhythm  on EKG, interpreted by myself  -echocardiogram  -Tylenol for fever, mild to moderate pain  -Cefepime q8 for neutropenic fever. Patient and mother report allergy to Amoxicillin was as an infant and was a mild rash, no airway or breathing problems.  -UA, blood cultures pending  -chest xray show catheter in the correct location, no acute cardiopulmonary process  -labs q8 hours  -continue heparin drip  -ocean nasal spray  -Oncology following, will obtain bone marrow biopsy as soon as available.     I have personally seen and examined the patient at bedside. I discussed the plan of care with patient, family, bedside RN, charge RN and oncology and Interventional Radiology.    Consultants/Specialty  nephrology and oncology    Code Status  Full Code    Disposition  Patient is not medically cleared for discharge.   Anticipate discharge to to home with close outpatient follow-up.  I have placed the appropriate orders for post-discharge needs.    Review of Systems  Review of Systems   Constitutional: Positive for malaise/fatigue. Negative for chills and fever.   HENT: Negative for congestion and nosebleeds.    Respiratory: Negative for cough, sputum production, shortness of breath and wheezing.    Cardiovascular: Positive for palpitations (during dialysis cather placement) and leg swelling. Negative for chest pain.   Gastrointestinal: Negative for abdominal pain, constipation, diarrhea, nausea and vomiting.   Genitourinary: Negative for dysuria, flank pain, frequency, hematuria and urgency.   Musculoskeletal: Positive for myalgias (RLE).   Skin: Negative for itching and rash.   Neurological: Negative for dizziness, sensory change, focal weakness, weakness and headaches.   Psychiatric/Behavioral: Negative for depression. The patient is not nervous/anxious.         Physical Exam  Temp:  [36.6 °C (97.8 °F)-38.5 °C (101.3 °F)] 38.5 °C (101.3 °F)  Pulse:  [] 110  Resp:  [12-28] 18  BP: (112-138)/(52-96) 119/74  SpO2:  [21  %-98 %] 95 %    Physical Exam  Vitals and nursing note reviewed.   Constitutional:       General: He is awake. He is not in acute distress.     Appearance: Normal appearance. He is not ill-appearing or toxic-appearing.   HENT:      Head: Normocephalic and atraumatic.      Right Ear: External ear normal.      Left Ear: External ear normal.      Nose: Nose normal.      Mouth/Throat:      Mouth: Mucous membranes are moist.      Pharynx: Oropharynx is clear.   Eyes:      General:         Right eye: No discharge.         Left eye: No discharge.      Extraocular Movements: Extraocular movements intact.      Conjunctiva/sclera: Conjunctivae normal.   Cardiovascular:      Rate and Rhythm: Regular rhythm. Tachycardia present.      Pulses: Normal pulses.   Pulmonary:      Effort: Pulmonary effort is normal.      Breath sounds: Normal breath sounds. No wheezing.   Abdominal:      General: Abdomen is flat. There is no distension.      Palpations: Abdomen is soft.      Tenderness: There is no abdominal tenderness.   Musculoskeletal:      Cervical back: Neck supple. No rigidity or tenderness.      Right lower leg: Swelling and tenderness present. No edema.      Left lower leg: No edema.   Skin:     General: Skin is warm and dry.      Coloration: Skin is not jaundiced.      Findings: No bruising.   Neurological:      General: No focal deficit present.      Mental Status: He is alert and oriented to person, place, and time.   Psychiatric:         Attention and Perception: Attention normal.         Mood and Affect: Mood normal.         Speech: Speech normal.         Behavior: Behavior is cooperative.         Thought Content: Thought content normal.         Cognition and Memory: Cognition normal.         Judgment: Judgment normal.         Fluids    Intake/Output Summary (Last 24 hours) at 5/28/2022 1055  Last data filed at 5/28/2022 0651  Gross per 24 hour   Intake --   Output 800 ml   Net -800 ml       Laboratory  Recent Labs      05/27/22 1808 05/28/22  0009   WBC >440.0* 389.0*   RBC 3.45* 2.98*   HEMOGLOBIN 10.0* 8.6*   HEMATOCRIT 32.5* 28.1*   MCV 94.2 94.3   MCH 29.0 28.9   MCHC 30.8* 30.6*   RDW 53.1* 54.2*   PLATELETCT 53* 41*   MPV 10.2 10.1     Recent Labs     05/27/22 1808 05/28/22  0009   SODIUM 138 134*  134*   POTASSIUM 3.6 4.7  5.0   CHLORIDE 100 101  102   CO2 24 23  23   GLUCOSE 107* 114*  111*   BUN 20 19  19   CREATININE 1.35 1.22  1.19   CALCIUM 9.4 8.5  8.2*     Recent Labs     05/27/22 1808 05/28/22  0009   APTT 35.0 36.5*   INR 1.37* 1.32*               Imaging  DX-CHEST-PORTABLE (1 VIEW)   Final Result      Right IJ central line in place without evidence of pneumothorax.      Bibasilar atelectasis.      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  Hydration with urine output monitoring, rasburicase, Uloric, hydroxyurea ordered  Follow-up oncology consult and coags.  Bone marrow biopsy as soon as available  Labs every 8 hours    Superficial vein thrombosis  Assessment & Plan  Modified IV heparin protocol ordered no bolus or rebolus.    Right leg pain  Assessment & Plan  Complicated by superficial thrombosis.  Heparin.  Symptomatic management    Tachycardia with heart rate 100-120 beats per minute  Assessment & Plan  Higher rate and palpitations during catheter placement, Asymptomatic after catheter placement completed.  Normal sinus rhythm on EKG  Echocardiogram pending  Telemetry monitoring    Neutropenic fever (HCC)  Assessment & Plan  Started 5/28  Cefepime for 7 days, may need to extend therapy  Chest x-ray showed no acute cardiopulmonary process  UA, blood cultures pending  Tylenol    Elevated blood uric acid level  Assessment & Plan  Hydration with monitor of urine  output, rasburicase, Uloric.   Follow-up chemistry.    Back pain  Assessment & Plan  Likely secondary to malignancy.  Symptomatic management ordered    PE (pulmonary thromboembolism) (HCC)  Assessment & Plan  Unclear if secondary to leukostasis versus pulmonary embolism.  Discussed at length with oncologist recommending modified IV heparin.  No bolus or rebolus.  Follow-up fibrinogen and coags.       VTE prophylaxis: therapeutic anticoagulation with heparin drip    I have performed a physical exam and reviewed and updated ROS and Plan today (5/28/2022). In review of yesterday's note (5/27/2022), there are no changes except as documented above.    MIHAI Mcclure.

## 2022-05-28 NOTE — H&P
Hospital Medicine History & Physical Note    Date of Service  5/27/22    Primary Care Physician  Maribel Mosquera M.D.    Consultants  nephrology and oncology    Specialist Names:     Code Status  Full Code    Chief Complaint  Abnormal labs    History of Presenting Illness  Tomas Jean-Baptiste is a 21 y.o. male who presented 5/27/2022 via transfer from [facility].  He was initially admitted for work-up of left lower extremity pain and swelling found to have superficial DVT, bilateral subsegmental pulmonary embolism and a white blood cell count of 440,000.  Dr. Brennan from oncology consulted recommending transfer to [facility] for ALL needing temporary hemodialysis catheter placement and leukapheresis which is not available at the other facility.    At bedside he is in moderate distress secondary to right lower extremity pain.  He describes several weeks of generalized itching, subjective fever and chills and low back pain.  He denies mucosal bleeding.  He denies any chronic medical history or current medication use.    I discussed the plan of care with patient.    Review of Systems  Review of Systems   Constitutional: Positive for chills, fever and malaise/fatigue. Negative for weight loss.   HENT: Negative for sore throat and tinnitus.    Eyes: Negative for blurred vision and double vision.   Respiratory: Positive for shortness of breath. Negative for cough, hemoptysis and stridor.    Cardiovascular: Positive for leg swelling. Negative for chest pain and palpitations.   Gastrointestinal: Negative for nausea and vomiting.   Genitourinary: Negative for dysuria and urgency.   Musculoskeletal: Positive for back pain and myalgias. Negative for neck pain.   Skin: Positive for itching. Negative for rash.   Neurological: Negative for dizziness and headaches.   Endo/Heme/Allergies: Does not bruise/bleed easily.   Psychiatric/Behavioral: Negative for depression. The patient does not have insomnia.         Past Medical History  Denies    Surgical History  Denies    Family History  family history includes Diabetes in his paternal grandfather; Hyperlipidemia in his paternal grandfather; Hypertension in his paternal grandfather; No Known Problems in his mother.   Family history reviewed with patient. There is no family history that is pertinent to the chief complaint.     Social History   reports that he has never smoked. He has never used smokeless tobacco. He reports that he does not drink alcohol and does not use drugs.    Allergies  Allergies   Allergen Reactions   • Amoxicillin        Medications  Prior to Admission Medications   Prescriptions Last Dose Informant Patient Reported? Taking?   ammonium lactate (LAC-HYDRIN) 12 % Lotion   No No   Sig: Apply to peeling areas of feet twice daily as needed   ascorbic acid (ASCORBIC ACID) 500 MG Tab   Yes No   Sig: Take 500 mg by mouth every day.   therapeutic multivitamin-minerals (THERAGRAN-M) Tab   Yes No   Sig: Take 1 Tab by mouth every day.      Facility-Administered Medications: None       Physical Exam  Temp:  [36.6 °C (97.8 °F)-37.4 °C (99.4 °F)] 36.6 °C (97.8 °F)  Pulse:  [] 96  Resp:  [18] 18  BP: (112-123)/(52-67) 112/52  SpO2:  [92 %-94 %] 92 %  Blood Pressure: 112/52   Temperature: 36.6 °C (97.8 °F)   Pulse: 96   Respiration: 18   Pulse Oximetry: 92 %       Physical Exam  Vitals and nursing note reviewed.   Constitutional:       General: He is in acute distress.      Appearance: Normal appearance. He is normal weight. He is ill-appearing. He is not toxic-appearing.   HENT:      Head: Normocephalic and atraumatic.      Nose: Nose normal. No congestion or rhinorrhea.      Mouth/Throat:      Mouth: Mucous membranes are moist.      Pharynx: Oropharynx is clear.   Eyes:      Extraocular Movements: Extraocular movements intact.      Conjunctiva/sclera: Conjunctivae normal.      Pupils: Pupils are equal, round, and reactive to light.   Neck:      Vascular:  No carotid bruit.   Cardiovascular:      Rate and Rhythm: Normal rate and regular rhythm.      Pulses: Normal pulses.      Heart sounds: Normal heart sounds. No murmur heard.    No gallop.   Pulmonary:      Effort: No respiratory distress.      Breath sounds: Normal breath sounds. No wheezing or rales.   Abdominal:      General: Abdomen is flat. Bowel sounds are normal. There is no distension.      Palpations: Abdomen is soft. There is no mass.      Tenderness: There is no abdominal tenderness.      Hernia: No hernia is present.   Musculoskeletal:         General: Swelling present. No tenderness or signs of injury.      Cervical back: Normal range of motion and neck supple. No muscular tenderness.      Comments: Exquisite right lower extremity pain and mild swelling.   Lymphadenopathy:      Cervical: No cervical adenopathy.   Skin:     Capillary Refill: Capillary refill takes less than 2 seconds.      Coloration: Skin is not jaundiced or pale.      Findings: No bruising, erythema, lesion or rash.   Neurological:      General: No focal deficit present.      Mental Status: He is alert and oriented to person, place, and time. Mental status is at baseline.      Cranial Nerves: No cranial nerve deficit.      Motor: No weakness.      Coordination: Coordination normal.   Psychiatric:         Mood and Affect: Mood normal.         Thought Content: Thought content normal.         Judgment: Judgment normal.         Laboratory:  Recent Labs     05/27/22  1808 05/28/22  0009   WBC >440.0* 389.0*   RBC 3.45* 2.98*   HEMOGLOBIN 10.0* 8.6*   HEMATOCRIT 32.5* 28.1*   MCV 94.2 94.3   MCH 29.0 28.9   MCHC 30.8* 30.6*   RDW 53.1* 54.2*   PLATELETCT 53* 41*   MPV 10.2 10.1     Recent Labs     05/27/22  1808 05/28/22  0009   SODIUM 138 134*  134*   POTASSIUM 3.6 4.7  5.0   CHLORIDE 100 101  102   CO2 24 23  23   GLUCOSE 107* 114*  111*   BUN 20 19  19   CREATININE 1.35 1.22  1.19   CALCIUM 9.4 8.5  8.2*     Recent Labs      05/27/22 1808 05/28/22  0009   ALTSGPT 17 10   ASTSGOT 30 26   ALKPHOSPHAT 139* 108*   TBILIRUBIN 0.5 0.4   GLUCOSE 107* 114*  111*     Recent Labs     05/27/22 1808 05/28/22  0009   APTT 35.0 36.5*   INR 1.37* 1.32*     No results for input(s): NTPROBNP in the last 72 hours.      No results for input(s): TROPONINT in the last 72 hours.    Imaging:  IR-CONSULT AND TREAT    (Results Pending)       X-Ray:  I have personally reviewed the images and compared with prior images.    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  Hydration with urine output monitoring, rasburicase, Uloric, hydroxyurea ordered  Follow-up oncology consult and coags.    Elevated blood uric acid level  Assessment & Plan  Hydration with monitor of urine output, rasburicase, Uloric.   Follow-up chemistry.    Superficial vein thrombosis  Assessment & Plan  Modified IV heparin protocol ordered no bolus or rebolus .    Back pain  Assessment & Plan  Likely secondary to malignancy.  Symptomatic management ordered    Right leg pain  Assessment & Plan  Complicated by superficial thrombosis.  Heparin.  Symptomatic management    PE (pulmonary thromboembolism) (HCC)  Assessment & Plan  Unclear if secondary to leukostasis versus pulmonary embolism.  Discussed at length with oncologist recommending modified IV heparin.  No bolus or rebolus.  Follow-up fibrinogen and coags.        VTE prophylaxis: heparin ppx

## 2022-05-28 NOTE — PROGRESS NOTES
Pt taken down to IR for dialysis cath placement, received phone call from IR nurse that during the procedure pt went into Afib with RVR sustaining in the 160's and pt has not converted out. Updated Jayy DAO and received order to transfer pt to tele. Report called to Zina MARIANO. Pt transferred from IR to [id number].

## 2022-05-28 NOTE — CONSULTS
"[facility] -  CONSULTATION NOTE               Author: Jessica Smith D.O. Date & Time: 5/28/2022  10:57 AM       REASON FOR CONSULTATION:   - ALL, leukapheresis    CHIEF COMPLAINT:   -  LE pain, fatigue, transferred from [facility] for leukapheresis    HISTORY OF PRESENT ILLNESS:    22yo male transferred from [facility] for leukapheresis for newly diagnosed ALL. He was seen by hematology at [facility]. Had complaint of LE pain and edema after travel, sent in for evaluation for DVT. He also reported SOB to hematology, now improved per patient. CTPE demonstrated subsegmental PEs. +Superficial RLE thrombosis. Heparin drip has been ordered.     Patient had temp line placed this AM, developed arrhythmia while in radiology, transferred to telemetry for monitoring. EKG with sinus tachycardia.     Patient is somnolent, but arouses easily and answers questions. BM biopsy planned for next week. Flow cytometry pending. Hydroxyurea and uloric have been started.     No C/N/V/CP/SOB. +fevers. +fatigue. No melena, hematochezia, hematemesis.  No HA, visual changes, or abdominal pain.    REVIEW OF SYSTEMS:    10 point ROS was performed and is as per HPI or otherwise negative    PAST MEDICAL HISTORY:   - none    PAST SURGICAL HISTORY:   - temp CVC placed    FAMILY HISTORY:   - Reviewed and non contributory to current illness, +DM    SOCIAL HISTORY:   - No tobacco  - No EtOH  - No illicits    HOME MEDICATIONS:   - Reviewed and documented in chart    LABORATORY STUDIES:   - Reviewed and documented in chart    ALLERGIES:  Amoxicillin    VS:  /74   Pulse (!) 110   Temp (!) 38.5 °C (101.3 °F) (Temporal)   Resp 18   Ht 1.651 m (5' 5\")   Wt 74.4 kg (164 lb 0.4 oz)   SpO2 95%   BMI 27.29 kg/m²   Physical Exam  Vitals and nursing note reviewed.   Constitutional:       General: He is in acute distress.      Appearance: Normal appearance. He is not toxic-appearing.   HENT:      Head: Normocephalic and atraumatic.     "  Right Ear: External ear normal.      Left Ear: External ear normal.      Nose: Nose normal.      Mouth/Throat:      Mouth: Mucous membranes are moist.      Pharynx: Oropharynx is clear.   Eyes:      General:         Right eye: No discharge.         Left eye: No discharge.      Extraocular Movements: Extraocular movements intact.      Conjunctiva/sclera: Conjunctivae normal.   Cardiovascular:      Rate and Rhythm: Regular rhythm. Tachycardia present.   Pulmonary:      Effort: Pulmonary effort is normal.      Breath sounds: No wheezing.   Abdominal:      General: Abdomen is flat. There is no distension.      Palpations: Abdomen is soft.      Tenderness: There is no abdominal tenderness.   Musculoskeletal:      Cervical back: Neck supple. No rigidity or tenderness.      Right lower leg: Edema present.      Left lower leg: Edema present.   Skin:     General: Skin is warm and dry.      Coloration: Skin is not jaundiced.      Findings: No bruising.   Neurological:      General: No focal deficit present.      Mental Status: He is oriented to person, place, and time. He is lethargic.   Psychiatric:         Mood and Affect: Mood normal.         Thought Content: Thought content normal.         Judgment: Judgment normal.         FLUID BALANCE:  In: -   Out: 800     LABS:  Recent Labs     05/27/22  1808 05/28/22  0009   SODIUM 138 134*  134*   POTASSIUM 3.6 4.7  5.0   CHLORIDE 100 101  102   CO2 24 23  23   GLUCOSE 107* 114*  111*   BUN 20 19  19   CREATININE 1.35 1.22  1.19   CALCIUM 9.4 8.5  8.2*        IMAGING:  - Imaging studies reviewed by me    IMPRESSION:  # Acute leukemia  - hematology managing  - on hydroxyurea, some response  - no e/o DIC  - flow cytometry pending    # Leukostasis  - WB>440,000-->389,000  - has temp line for leukapheresis  - hydroxyurea    # TLS  - phos and uric acid elevated, Ca low  - IVF, rasburicase, uloric    # JACOBY 2/2 TLS  - SCr 1.4 OSH, now ~1.2    # Neutropenic fever  - cefepime,  isolation  - cultures pending    # Subsegmental PEs  - cautious heparin drip    # Thrombocytopenia    # Anemia    # RLE pain  - 2/2 superficial thrombosis    # Arrhythmia  - EKG with sinus tach  - on telemetry      PLAN:  - leukapheresis ordered daily until WBC<100,000  - have reviewed heparin drip with hematology, will hold while on leukapheresis  - daily labs  - continue IVF  - agree with rasburicase, uloric  - agree with cautious use of heparin given thrombocytopenia  - Dose all meds per eGFR  - agree with cefepime, abx, isolation      Other management per primary service/hematology  Nephrology will follow  Reviewed with nursing staff, family, hematology        Thank you for the consultation!

## 2022-05-28 NOTE — HOSPITAL COURSE
Tomas Jean-Baptiste is a 21 y.o. male who presented 5/27/2022 via transfer from [facility].  He was initially admitted for work-up of left lower extremity pain and swelling found to have superficial DVT, bilateral subsegmental pulmonary embolism and a white blood cell count of 440,000.  Dr. Brennan from oncology consulted recommended transfer to [facility] for ALL needing temporary hemodialysis catheter placement and leukapheresis which is not available at the other facility.    On admission it was reported the patient had right lower extremity pain.  He described several weeks of generalized itching, subjective fever and chills and low back pain.  He has had an intermittent bloody nose from his right nare over the last couple of weeks.  He denies any chronic medical history or current medication use.

## 2022-05-28 NOTE — PROGRESS NOTES
4 Eyes Skin Assessment Completed by rose mary RN and MONTSERRAT chen.    Head WDL  Ears WDL  Nose WDL  Mouth WDL  Neck WDL  Breast/Chest WDL  Shoulder Blades WDL  Spine WDL  (R) Arm/Elbow/Hand WDL  (L) Arm/Elbow/Hand WDL  Abdomen WDL  Groin WDL  Scrotum/Coccyx/Buttocks WDL  (R) Leg WDL  (L) Leg WDL  (R) Heel/Foot/Toe WDL  (L) Heel/Foot/Toe WDL          Devices In Places Blood Pressure Cuff and Pulse Ox      Interventions In Place N/A    Possible Skin Injury No    Pictures Uploaded Into Epic N/A  Wound Consult Placed N/A  RN Wound Prevention Protocol Ordered No

## 2022-05-28 NOTE — PROGRESS NOTES
Pt arrived to unit. AOx4, unlabored breathing on RA. Pt reported pain to R medial calf.  called and on call hospitalist receiving direct admits paged.

## 2022-05-28 NOTE — OR SURGEON
Immediate Post- Operative Note        Findings: ALL      Procedure(s): NTDC ok to use      Estimated Blood Loss: Less than 5 ml        Complications: tachycardia during procedure, persisted despite minimal wire manipulation. ?AF. Assymptomatic. Voalte'd to Dr Simone Pelaez.            5/28/2022     9:03 AM     Geovany Linder M.D.

## 2022-05-28 NOTE — ASSESSMENT & PLAN NOTE
IR consulted for temporary hemodialysis and leukapheresis in a.m.  Hydration with urine output monitoring, rasburicase, Uloric, hydroxyurea ordered  Follow-up oncology consult and coags.  Bone marrow biopsy as soon as available  Labs every 8 hours

## 2022-05-29 ENCOUNTER — APPOINTMENT (OUTPATIENT)
Dept: CARDIOLOGY | Facility: MEDICAL CENTER | Age: 22
DRG: 834 | End: 2022-05-29
Attending: NURSE PRACTITIONER
Payer: COMMERCIAL

## 2022-05-29 DIAGNOSIS — C91.00 PRE B-CELL ACUTE LYMPHOBLASTIC LEUKEMIA (ALL) (HCC): ICD-10-CM

## 2022-05-29 LAB
ANION GAP SERPL CALC-SCNC: 7 MMOL/L (ref 7–16)
ANION GAP SERPL CALC-SCNC: 9 MMOL/L (ref 7–16)
ANION GAP SERPL CALC-SCNC: 9 MMOL/L (ref 7–16)
ANISOCYTOSIS BLD QL SMEAR: ABNORMAL
ANISOCYTOSIS BLD QL SMEAR: ABNORMAL
BASOPHILS # BLD AUTO: 0 % (ref 0–1.8)
BASOPHILS # BLD: 0 K/UL (ref 0–0.12)
BLASTS NFR BLD MANUAL: 69 %
BLASTS NFR BLD MANUAL: 79.3 %
BLASTS NFR BLD MANUAL: 92.5 %
BUN SERPL-MCNC: 10 MG/DL (ref 8–22)
BUN SERPL-MCNC: 6 MG/DL (ref 8–22)
BUN SERPL-MCNC: 7 MG/DL (ref 8–22)
CALCIUM SERPL-MCNC: 7.8 MG/DL (ref 8.5–10.5)
CALCIUM SERPL-MCNC: 8.2 MG/DL (ref 8.5–10.5)
CALCIUM SERPL-MCNC: 8.5 MG/DL (ref 8.5–10.5)
CHLORIDE SERPL-SCNC: 102 MMOL/L (ref 96–112)
CHLORIDE SERPL-SCNC: 103 MMOL/L (ref 96–112)
CHLORIDE SERPL-SCNC: 105 MMOL/L (ref 96–112)
CO2 SERPL-SCNC: 23 MMOL/L (ref 20–33)
CO2 SERPL-SCNC: 24 MMOL/L (ref 20–33)
CO2 SERPL-SCNC: 25 MMOL/L (ref 20–33)
CREAT SERPL-MCNC: 0.85 MG/DL (ref 0.5–1.4)
CREAT SERPL-MCNC: 0.87 MG/DL (ref 0.5–1.4)
CREAT SERPL-MCNC: 0.87 MG/DL (ref 0.5–1.4)
EOSINOPHIL # BLD AUTO: 0 K/UL (ref 0–0.51)
EOSINOPHIL NFR BLD: 0 % (ref 0–6.9)
ERYTHROCYTE [DISTWIDTH] IN BLOOD BY AUTOMATED COUNT: 53.1 FL (ref 35.9–50)
ERYTHROCYTE [DISTWIDTH] IN BLOOD BY AUTOMATED COUNT: 54.7 FL (ref 35.9–50)
ERYTHROCYTE [DISTWIDTH] IN BLOOD BY AUTOMATED COUNT: 55.7 FL (ref 35.9–50)
FIBRINOGEN PPP-MCNC: 287 MG/DL (ref 215–460)
FIBRINOGEN PPP-MCNC: 324 MG/DL (ref 215–460)
FIBRINOGEN PPP-MCNC: 369 MG/DL (ref 215–460)
GFR SERPLBLD CREATININE-BSD FMLA CKD-EPI: 126 ML/MIN/1.73 M 2
GFR SERPLBLD CREATININE-BSD FMLA CKD-EPI: 126 ML/MIN/1.73 M 2
GFR SERPLBLD CREATININE-BSD FMLA CKD-EPI: 127 ML/MIN/1.73 M 2
GLUCOSE SERPL-MCNC: 103 MG/DL (ref 65–99)
GLUCOSE SERPL-MCNC: 120 MG/DL (ref 65–99)
GLUCOSE SERPL-MCNC: 98 MG/DL (ref 65–99)
HCT VFR BLD AUTO: 28.8 % (ref 42–52)
HCT VFR BLD AUTO: 29.9 % (ref 42–52)
HCT VFR BLD AUTO: 32.3 % (ref 42–52)
HGB BLD-MCNC: 10.4 G/DL (ref 14–18)
HGB BLD-MCNC: 9 G/DL (ref 14–18)
HGB BLD-MCNC: 9.3 G/DL (ref 14–18)
LYMPHOCYTES # BLD AUTO: 17.5 K/UL (ref 1–4.8)
LYMPHOCYTES # BLD AUTO: 30.78 K/UL (ref 1–4.8)
LYMPHOCYTES # BLD AUTO: 56.66 K/UL (ref 1–4.8)
LYMPHOCYTES NFR BLD: 19.3 % (ref 22–41)
LYMPHOCYTES NFR BLD: 22.1 % (ref 22–41)
LYMPHOCYTES NFR BLD: 6.3 % (ref 22–41)
MACROCYTES BLD QL SMEAR: ABNORMAL
MACROCYTES BLD QL SMEAR: ABNORMAL
MANUAL DIFF BLD: ABNORMAL
MCH RBC QN AUTO: 29.3 PG (ref 27–33)
MCH RBC QN AUTO: 29.4 PG (ref 27–33)
MCH RBC QN AUTO: 29.6 PG (ref 27–33)
MCHC RBC AUTO-ENTMCNC: 31.1 G/DL (ref 33.7–35.3)
MCHC RBC AUTO-ENTMCNC: 31.3 G/DL (ref 33.7–35.3)
MCHC RBC AUTO-ENTMCNC: 32.2 G/DL (ref 33.7–35.3)
MCV RBC AUTO: 92 FL (ref 81.4–97.8)
MCV RBC AUTO: 94.1 FL (ref 81.4–97.8)
MCV RBC AUTO: 94.3 FL (ref 81.4–97.8)
MICROCYTES BLD QL SMEAR: ABNORMAL
MICROCYTES BLD QL SMEAR: ABNORMAL
MONOCYTES # BLD AUTO: 1.67 K/UL (ref 0–0.85)
MONOCYTES # BLD AUTO: 2.23 K/UL (ref 0–0.85)
MONOCYTES # BLD AUTO: 4.62 K/UL (ref 0–0.85)
MONOCYTES NFR BLD AUTO: 0.6 % (ref 0–13.4)
MONOCYTES NFR BLD AUTO: 1.4 % (ref 0–13.4)
MONOCYTES NFR BLD AUTO: 1.8 % (ref 0–13.4)
MORPHOLOGY BLD-IMP: NORMAL
NEUTROPHILS # BLD AUTO: 0 K/UL (ref 1.82–7.42)
NEUTROPHILS # BLD AUTO: 1.67 K/UL (ref 1.82–7.42)
NEUTROPHILS # BLD AUTO: 18.2 K/UL (ref 1.82–7.42)
NEUTROPHILS NFR BLD: 0 % (ref 44–72)
NEUTROPHILS NFR BLD: 0 % (ref 44–72)
NEUTROPHILS NFR BLD: 7.1 % (ref 44–72)
NEUTS BAND NFR BLD MANUAL: 0.6 % (ref 0–10)
NRBC # BLD AUTO: 0.09 K/UL
NRBC # BLD AUTO: 0.16 K/UL
NRBC # BLD AUTO: 0.21 K/UL
NRBC BLD-RTO: 0.1 /100 WBC
PHOSPHATE SERPL-MCNC: 3 MG/DL (ref 2.5–4.5)
PHOSPHATE SERPL-MCNC: 3.2 MG/DL (ref 2.5–4.5)
PHOSPHATE SERPL-MCNC: 4 MG/DL (ref 2.5–4.5)
PLATELET # BLD AUTO: 22 K/UL (ref 164–446)
PLATELET # BLD AUTO: 32 K/UL (ref 164–446)
PLATELET # BLD AUTO: 43 K/UL (ref 164–446)
PLATELET BLD QL SMEAR: NORMAL
PMV BLD AUTO: 10.5 FL (ref 9–12.9)
PMV BLD AUTO: 8.2 FL (ref 9–12.9)
PMV BLD AUTO: 9.2 FL (ref 9–12.9)
POTASSIUM SERPL-SCNC: 4.5 MMOL/L (ref 3.6–5.5)
POTASSIUM SERPL-SCNC: 4.5 MMOL/L (ref 3.6–5.5)
POTASSIUM SERPL-SCNC: 5.2 MMOL/L (ref 3.6–5.5)
RBC # BLD AUTO: 3.06 M/UL (ref 4.7–6.1)
RBC # BLD AUTO: 3.17 M/UL (ref 4.7–6.1)
RBC # BLD AUTO: 3.51 M/UL (ref 4.7–6.1)
RBC BLD AUTO: NORMAL
RBC BLD AUTO: PRESENT
RBC BLD AUTO: PRESENT
SODIUM SERPL-SCNC: 135 MMOL/L (ref 135–145)
SODIUM SERPL-SCNC: 136 MMOL/L (ref 135–145)
SODIUM SERPL-SCNC: 136 MMOL/L (ref 135–145)
TSH SERPL DL<=0.005 MIU/L-ACNC: 2.88 UIU/ML (ref 0.38–5.33)
UFH PPP CHRO-ACNC: 0.16 IU/ML
UFH PPP CHRO-ACNC: 0.27 IU/ML
UFH PPP CHRO-ACNC: <0.1 IU/ML
UFH PPP CHRO-ACNC: <0.1 IU/ML
URATE SERPL-MCNC: 2.8 MG/DL (ref 2.5–8.3)
URATE SERPL-MCNC: 3.5 MG/DL (ref 2.5–8.3)
URATE SERPL-MCNC: 3.9 MG/DL (ref 2.5–8.3)
WBC # BLD AUTO: 159.5 K/UL (ref 4.8–10.8)
WBC # BLD AUTO: 256.4 K/UL (ref 4.8–10.8)
WBC # BLD AUTO: 277.7 K/UL (ref 4.8–10.8)

## 2022-05-29 PROCEDURE — 84550 ASSAY OF BLOOD/URIC ACID: CPT

## 2022-05-29 PROCEDURE — 86900 BLOOD TYPING SEROLOGIC ABO: CPT

## 2022-05-29 PROCEDURE — 700105 HCHG RX REV CODE 258: Performed by: NURSE PRACTITIONER

## 2022-05-29 PROCEDURE — 86901 BLOOD TYPING SEROLOGIC RH(D): CPT

## 2022-05-29 PROCEDURE — 99233 SBSQ HOSP IP/OBS HIGH 50: CPT | Performed by: STUDENT IN AN ORGANIZED HEALTH CARE EDUCATION/TRAINING PROGRAM

## 2022-05-29 PROCEDURE — 36514 APHERESIS PLASMA: CPT

## 2022-05-29 PROCEDURE — 84100 ASSAY OF PHOSPHORUS: CPT | Mod: 91

## 2022-05-29 PROCEDURE — 85520 HEPARIN ASSAY: CPT | Mod: 91

## 2022-05-29 PROCEDURE — 80048 BASIC METABOLIC PNL TOTAL CA: CPT | Mod: 91

## 2022-05-29 PROCEDURE — 700102 HCHG RX REV CODE 250 W/ 637 OVERRIDE(OP): Performed by: INTERNAL MEDICINE

## 2022-05-29 PROCEDURE — 770001 HCHG ROOM/CARE - MED/SURG/GYN PRIV*

## 2022-05-29 PROCEDURE — 85384 FIBRINOGEN ACTIVITY: CPT

## 2022-05-29 PROCEDURE — 85007 BL SMEAR W/DIFF WBC COUNT: CPT | Mod: 91

## 2022-05-29 PROCEDURE — 700101 HCHG RX REV CODE 250

## 2022-05-29 PROCEDURE — 700111 HCHG RX REV CODE 636 W/ 250 OVERRIDE (IP): Performed by: INTERNAL MEDICINE

## 2022-05-29 PROCEDURE — A9270 NON-COVERED ITEM OR SERVICE: HCPCS | Performed by: INTERNAL MEDICINE

## 2022-05-29 PROCEDURE — 99223 1ST HOSP IP/OBS HIGH 75: CPT | Performed by: PEDIATRICS

## 2022-05-29 PROCEDURE — 700105 HCHG RX REV CODE 258: Performed by: INTERNAL MEDICINE

## 2022-05-29 PROCEDURE — 700111 HCHG RX REV CODE 636 W/ 250 OVERRIDE (IP): Performed by: NURSE PRACTITIONER

## 2022-05-29 PROCEDURE — 84443 ASSAY THYROID STIM HORMONE: CPT

## 2022-05-29 PROCEDURE — 85025 COMPLETE CBC W/AUTO DIFF WBC: CPT | Mod: 91

## 2022-05-29 RX ORDER — CALCIUM GLUCONATE 94 MG/ML
5 INJECTION, SOLUTION INTRAVENOUS
Status: COMPLETED | OUTPATIENT
Start: 2022-05-29 | End: 2022-05-29

## 2022-05-29 RX ORDER — ONDANSETRON 2 MG/ML
8 INJECTION INTRAMUSCULAR; INTRAVENOUS ONCE
Status: CANCELLED | OUTPATIENT
Start: 2022-05-29

## 2022-05-29 RX ORDER — SODIUM CHLORIDE 9 MG/ML
INJECTION, SOLUTION INTRAVENOUS CONTINUOUS
Status: DISCONTINUED | OUTPATIENT
Start: 2022-05-29 | End: 2022-06-04

## 2022-05-29 RX ORDER — SODIUM CHLORIDE 9 MG/ML
500 INJECTION, SOLUTION INTRAVENOUS CONTINUOUS
Status: CANCELLED | OUTPATIENT
Start: 2022-05-29

## 2022-05-29 RX ORDER — DEXTROSE MONOHYDRATE, SODIUM CITRATE, AND CITRIC ACID MONOHYDRATE 2.45; 2.2; .8 G/100ML; G/100ML; G/100ML
INJECTION, SOLUTION INTRAVENOUS
Status: COMPLETED
Start: 2022-05-29 | End: 2022-05-29

## 2022-05-29 RX ORDER — HYDROXYUREA 500 MG/1
1500 CAPSULE ORAL 3 TIMES DAILY
Status: DISCONTINUED | OUTPATIENT
Start: 2022-05-29 | End: 2022-05-30

## 2022-05-29 RX ADMIN — HEPARIN SODIUM 22 UNITS/KG/HR: 5000 INJECTION, SOLUTION INTRAVENOUS at 17:14

## 2022-05-29 RX ADMIN — CEFEPIME 2 G: 2 INJECTION, POWDER, FOR SOLUTION INTRAVENOUS at 22:15

## 2022-05-29 RX ADMIN — HYDROXYUREA 1000 MG: 500 CAPSULE ORAL at 05:04

## 2022-05-29 RX ADMIN — SENNOSIDES AND DOCUSATE SODIUM 2 TABLET: 50; 8.6 TABLET ORAL at 17:52

## 2022-05-29 RX ADMIN — HYDROXYUREA 1000 MG: 500 CAPSULE ORAL at 11:49

## 2022-05-29 RX ADMIN — HYDROXYUREA 1500 MG: 500 CAPSULE ORAL at 19:58

## 2022-05-29 RX ADMIN — CALCIUM GLUCONATE 5 G: 98 INJECTION, SOLUTION INTRAVENOUS at 12:45

## 2022-05-29 RX ADMIN — HEPARIN SODIUM 22 UNITS/KG/HR: 5000 INJECTION, SOLUTION INTRAVENOUS at 11:33

## 2022-05-29 RX ADMIN — DEXTROSE MONOHYDRATE, SODIUM CITRATE, AND CITRIC ACID MONOHYDRATE 750 ML: 2.45; 2.2; .8 INJECTION, SOLUTION INTRAVENOUS at 12:39

## 2022-05-29 RX ADMIN — FEBUXOSTAT 120 MG: 40 TABLET, FILM COATED ORAL at 09:24

## 2022-05-29 RX ADMIN — OXYCODONE 2.5 MG: 5 TABLET ORAL at 19:57

## 2022-05-29 RX ADMIN — OXYCODONE 5 MG: 5 TABLET ORAL at 09:24

## 2022-05-29 RX ADMIN — SODIUM CHLORIDE: 9 INJECTION, SOLUTION INTRAVENOUS at 17:14

## 2022-05-29 RX ADMIN — CEFEPIME 2 G: 2 INJECTION, POWDER, FOR SOLUTION INTRAVENOUS at 05:07

## 2022-05-29 RX ADMIN — CEFEPIME 2 G: 2 INJECTION, POWDER, FOR SOLUTION INTRAVENOUS at 17:50

## 2022-05-29 ASSESSMENT — ENCOUNTER SYMPTOMS
WHEEZING: 0
DIARRHEA: 0
EYES NEGATIVE: 1
HEADACHES: 0
ABDOMINAL PAIN: 0
CHILLS: 0
COUGH: 0
DEPRESSION: 0
NERVOUS/ANXIOUS: 0
DIZZINESS: 0
MYALGIAS: 1
SENSORY CHANGE: 0
FOCAL WEAKNESS: 0
FLANK PAIN: 0
FEVER: 0
SHORTNESS OF BREATH: 0
WEAKNESS: 0
GASTROINTESTINAL NEGATIVE: 1
VOMITING: 0
SPUTUM PRODUCTION: 0
PSYCHIATRIC NEGATIVE: 1
RESPIRATORY NEGATIVE: 1
CONSTIPATION: 0
DIZZINESS: 1
PALPITATIONS: 1
NAUSEA: 0

## 2022-05-29 ASSESSMENT — PAIN DESCRIPTION - PAIN TYPE
TYPE: ACUTE PAIN

## 2022-05-29 ASSESSMENT — FIBROSIS 4 INDEX: FIB4 SCORE: 5.3

## 2022-05-29 NOTE — PROGRESS NOTES
Hospital Medicine Daily Progress Note    Date of Service  5/29/2022    Chief Complaint  Tomas Jean-Baptiste is a 21 y.o. male admitted 5/27/2022 with left lower extremity pain and abnormal labs, transferred from [facility] Course  Tomas Jean-Baptiste is a 21 y.o. male who presented 5/27/2022 via transfer from [facility].  He was initially admitted for work-up of left lower extremity pain and swelling found to have superficial DVT, bilateral subsegmental pulmonary embolism and a white blood cell count of 440,000.  Dr. Brennan from oncology consulted recommended transfer to [facility] for ALL needing temporary hemodialysis catheter placement and leukapheresis which is not available at the other facility.    On admission it was reported the patient had right lower extremity pain.  He described several weeks of generalized itching, subjective fever and chills and low back pain.  He has had an intermittent bloody nose from his right nare over the last couple of weeks.  He denies any chronic medical history or current medication use.    Interval Problem Update  No acute events overnight.  Patient reports pain by neck site and leg. Pain medication moderately effective at managing patient's pain. Adjust as needed.  Undergoing leukopheresis daily until WBC <100k. WBC improving with treatment.  Bone marrow biopsy when available.  On heparin drip for PE/DVT, platelets 36k. Monitor.  Defer to oncology threshold for holding anticoagulation.   Patient is on room air.  Patient remains in normal sinus rhythm, continue telemetry monitoring given ? A fib during dialysis catheter placement.  Echo pending. TSH ordered.  Can likely transfer back to oncology floor tomorrow if tele monitoring and echo are unremarkable. Can consider outpatient ziopatch on discharge.  Continue cefepime for neutropenic fever.    Addendum: Discussed with Dr Pepe Faye pediatric oncologist.  Pediatric oncology will take over as primary care team for patient. Please call hospitalist with any questions. We will sign off, thank you.    I have personally seen and examined the patient at bedside. I discussed the plan of care with patient, family, bedside RN, charge RN and oncology and Interventional Radiology.    Consultants/Specialty  nephrology and oncology    Code Status  Full Code    Disposition  Patient is not medically cleared for discharge.   Anticipate discharge to to home with close outpatient follow-up.  I have placed the appropriate orders for post-discharge needs.    Review of Systems  Review of Systems   Constitutional: Positive for malaise/fatigue. Negative for chills and fever.   HENT: Negative for congestion and nosebleeds.    Respiratory: Negative for cough, sputum production, shortness of breath and wheezing.    Cardiovascular: Positive for palpitations (during dialysis cather placement) and leg swelling. Negative for chest pain.   Gastrointestinal: Negative for abdominal pain, constipation, diarrhea, nausea and vomiting.   Genitourinary: Negative for dysuria, flank pain, frequency, hematuria and urgency.   Musculoskeletal: Positive for myalgias (RLE).   Skin: Negative for itching and rash.   Neurological: Negative for dizziness, sensory change, focal weakness, weakness and headaches.   Psychiatric/Behavioral: Negative for depression. The patient is not nervous/anxious.         Physical Exam  Temp:  [36.6 °C (97.9 °F)-38 °C (100.4 °F)] 37.1 °C (98.8 °F)  Pulse:  [] 103  Resp:  [16-18] 16  BP: (102-130)/(57-84) 130/71  SpO2:  [91 %-99 %] 91 %    Physical Exam  Vitals and nursing note reviewed.   Constitutional:       General: He is awake. He is not in acute distress.     Appearance: Normal appearance. He is not ill-appearing or toxic-appearing.   HENT:      Head: Normocephalic and atraumatic.      Right Ear: External ear normal.      Left Ear: External ear normal.      Nose: Nose  normal.      Mouth/Throat:      Mouth: Mucous membranes are moist.      Pharynx: Oropharynx is clear.   Eyes:      General:         Right eye: No discharge.         Left eye: No discharge.      Extraocular Movements: Extraocular movements intact.      Conjunctiva/sclera: Conjunctivae normal.   Cardiovascular:      Rate and Rhythm: Regular rhythm. Tachycardia present.      Pulses: Normal pulses.   Pulmonary:      Effort: Pulmonary effort is normal.      Breath sounds: Normal breath sounds. No wheezing.   Abdominal:      General: Abdomen is flat. There is no distension.      Palpations: Abdomen is soft.      Tenderness: There is no abdominal tenderness.   Musculoskeletal:      Cervical back: Neck supple. No rigidity or tenderness.      Right lower leg: Swelling and tenderness present. No edema.      Left lower leg: No edema.   Skin:     General: Skin is warm and dry.      Coloration: Skin is not jaundiced.      Findings: No bruising.   Neurological:      General: No focal deficit present.      Mental Status: He is alert and oriented to person, place, and time.   Psychiatric:         Attention and Perception: Attention normal.         Mood and Affect: Mood normal.         Speech: Speech normal.         Behavior: Behavior is cooperative.         Thought Content: Thought content normal.         Cognition and Memory: Cognition normal.         Judgment: Judgment normal.         Fluids    Intake/Output Summary (Last 24 hours) at 5/29/2022 1236  Last data filed at 5/29/2022 0512  Gross per 24 hour   Intake 360 ml   Output 1560 ml   Net -1200 ml       Laboratory  Recent Labs     05/28/22 1718 05/29/22  0140 05/29/22  0950   .3* 256.4* 277.7*   RBC 3.63* 3.06* 3.17*   HEMOGLOBIN 10.7* 9.0* 9.3*   HEMATOCRIT 34.7* 28.8* 29.9*   MCV 95.6 94.1 94.3   MCH 29.5 29.4 29.3   MCHC 30.8* 31.3* 31.1*   RDW 56.3* 54.7* 55.7*   PLATELETCT 31* 32* 43*   MPV 8.3* 9.2 10.5     Recent Labs     05/28/22 1718 05/29/22  0140  05/29/22  0950   SODIUM 138 135 136   POTASSIUM 4.4 5.2 4.5   CHLORIDE 107 105 103   CO2 22 23 24   GLUCOSE 103* 98 120*   BUN 13 10 7*   CREATININE 1.19 0.87 0.85   CALCIUM 8.6 7.8* 8.5     Recent Labs     05/27/22  1808 05/28/22  0009   APTT 35.0 36.5*   INR 1.37* 1.32*               Imaging  DX-CHEST-PORTABLE (1 VIEW)   Final Result      Right IJ central line in place without evidence of pneumothorax.      Bibasilar atelectasis.      IR-CVC NON TUNNELED > AGE 5   Final Result      1.  Successful image guided non-tunneled dialysis catheter placement.   2.  The patient became tachycardic during the procedure. This was not alleviated with positioning the wire into the IVC. On the tracing appear to be atrial fibrillation. The patient remained stable and asymptomatic. This was communicated with the    clinical team caring for this patient at the conclusion of the procedure via Voalte, with Jayy DAO attending and responding.      Plan: The catheter is available for immediate use.         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  Hydration with urine output monitoring, rasburicase, Uloric, hydroxyurea ordered  Follow-up oncology consult and coags.  Bone marrow biopsy as soon as available  Labs every 8 hours    Tachycardia with heart rate 100-120 beats per minute  Assessment & Plan  Higher rate and palpitations during catheter placement, Asymptomatic after catheter placement completed.  Normal sinus rhythm on EKG  Echocardiogram pending  Telemetry monitoring    Neutropenic fever (HCC)  Assessment & Plan  Started 5/28  Cefepime for 7 days, may need to extend therapy  Chest x-ray showed no acute cardiopulmonary process  UA, blood cultures pending  Tylenol    Elevated blood uric acid level  Assessment & Plan  Hydration with  monitor of urine output, rasburicase, Uloric.   Follow-up chemistry.    Superficial vein thrombosis  Assessment & Plan  Modified IV heparin protocol ordered no bolus or rebolus.    Back pain  Assessment & Plan  Likely secondary to malignancy.  Symptomatic management ordered    Right leg pain  Assessment & Plan  Complicated by superficial thrombosis.  Heparin.  Symptomatic management    PE (pulmonary thromboembolism) (HCC)  Assessment & Plan  Unclear if secondary to leukostasis versus pulmonary embolism.  Discussed at length with oncologist recommending modified IV heparin.  No bolus or rebolus.  Follow-up fibrinogen and coags.       VTE prophylaxis: therapeutic anticoagulation with heparin drip    I have performed a physical exam and reviewed and updated ROS and Plan today (5/29/2022). In review of yesterday's note (5/28/2022), there are no changes except as documented above.    Rodriguez Topete M.D.

## 2022-05-29 NOTE — CARE PLAN
The patient is Watcher - Medium risk of patient condition declining or worsening    Shift Goals  Clinical Goals: monitor labs  and pain  Patient Goals: rest and get leukophoresis procedure  Family Goals: bath and rest    Progress made toward(s) clinical / shift goals: Pt c/o pain mostly in new HD cath site - PRN oxy given w/ good effect. Pt AAOx4, SBA to BR, VSS, SR-ST on tele. Pt receiving second round of leukapheresis today. Heparin drip to be held during leukapheresis 2/2 pt receiving IV ACD during this procedure. Rate was running at 22units/kg/hr and was to be increased 2 units to 24units/kg per hour per protocol for 10:00am antixa = 0.27, however pt was to be transported to HD for the leukapheresis at this time so infusion was paused and has remained paused for duration of leukapheresis.       Problem: Knowledge Deficit - Standard  Goal: Patient and family/care givers will demonstrate understanding of plan of care, disease process/condition, diagnostic tests and medications  Outcome: Progressing     Problem: Pain - Standard  Goal: Alleviation of pain or a reduction in pain to the patient’s comfort goal  Outcome: Progressing     Problem: Optimal Care of the Patient with VTE  Goal: Peripheral tissue perfusion will improve  Outcome: Progressing  Goal: Complications related to the disease process, condition or treatment will be avoided or minimized  Outcome: Progressing  Goal: Symptoms of redness, swelling, and pain at the site of impaired tissue perfusion will improve  Outcome: Progressing     Problem: Respiratory  Goal: Patient will achieve/maintain optimum respiratory ventilation and gas exchange  Outcome: Progressing     Problem: Knowledge Deficit - VTE  Goal: Patient and family/care givers will demonstrate understanding of plan of care, disease process/condition, diagnostic tests and medications  Outcome: Progressing     Problem: Discharge Barriers/Planning  Goal: Patient's continuum of care needs are  met  Outcome: Progressing

## 2022-05-29 NOTE — CARE PLAN
The patient is Watcher - Medium risk of patient condition declining or worsening    Shift Goals  Clinical Goals: rest  Patient Goals: rest  Family Goals: bath and rest    Progress made toward(s) clinical / shift goals:      Problem: Knowledge Deficit - Standard  Goal: Patient and family/care givers will demonstrate understanding of plan of care, disease process/condition, diagnostic tests and medications  Outcome: Progressing     Problem: Pain - Standard  Goal: Alleviation of pain or a reduction in pain to the patient’s comfort goal  Outcome: Progressing       Patient is not progressing towards the following goals:

## 2022-05-29 NOTE — PROGRESS NOTES
"[facility] -  PROGRESS NOTE               Author: Jessica Smith D.O. Date & Time: 5/29/2022  6:13 AM     HISTORY OF PRESENT ILLNESS:    22yo male transferred from [facility] for leukapheresis for newly diagnosed ALL. He was seen by hematology at [facility]. Had complaint of LE pain and edema after travel, sent in for evaluation for DVT. He also reported SOB to hematology, now improved per patient. CTPE demonstrated subsegmental PEs. +Superficial RLE thrombosis. Heparin drip has been ordered.      Patient had temp line placed this AM, developed arrhythmia while in radiology, transferred to telemetry for monitoring. EKG with sinus tachycardia.      Patient is somnolent, but arouses easily and answers questions. BM biopsy planned for next week. Flow cytometry pending. Hydroxyurea and uloric have been started.      No C/N/V/CP/SOB. +fevers. +fatigue. No melena, hematochezia, hematemesis.  No HA, visual changes, or abdominal pain.    DAILY NEPHROLOGY SUMMARY:  05/28 - consult done, first leukapheresis done  05/29 - no new c/o, WBC trending down, SCr improved, phos and uric acid wnl, LE edema improved, c/o discomfort at temp line insertion site    REVIEW OF SYSTEMS:    10 point ROS reviewed and is as per HPI/daily summary or otherwise negative    PMH/PSH/SH/FH: Reviewed and unchanged since admission note  CURRENT MEDICATIONS: Reviewed from admission to present day    VS:  /60   Pulse 85   Temp 37.3 °C (99.1 °F) (Temporal)   Resp 16   Ht 1.651 m (5' 5\")   Wt 77.3 kg (170 lb 6.7 oz)   SpO2 94%   BMI 28.36 kg/m²   Physical Exam  Vitals and nursing note reviewed.   Constitutional:       General: He is not in acute distress.     Appearance: Normal appearance.   HENT:      Head: Normocephalic and atraumatic.      Right Ear: External ear normal.      Left Ear: External ear normal.      Nose: Nose normal.      Mouth/Throat:      Mouth: Mucous membranes are moist.      Pharynx: Oropharynx is " clear.   Eyes:      General: No scleral icterus.        Right eye: No discharge.         Left eye: No discharge.      Extraocular Movements: Extraocular movements intact.      Conjunctiva/sclera: Conjunctivae normal.   Cardiovascular:      Rate and Rhythm: Normal rate and regular rhythm.   Pulmonary:      Effort: Pulmonary effort is normal.      Breath sounds: No wheezing.   Abdominal:      General: Abdomen is flat. There is no distension.      Palpations: Abdomen is soft.      Tenderness: There is no abdominal tenderness.   Musculoskeletal:      Right lower leg: Edema present.      Left lower leg: No edema (trace).   Skin:     General: Skin is warm and dry.      Coloration: Skin is not jaundiced.      Findings: No bruising.   Neurological:      General: No focal deficit present.      Mental Status: He is alert and oriented to person, place, and time.      Cranial Nerves: No cranial nerve deficit.   Psychiatric:         Mood and Affect: Mood normal.         Thought Content: Thought content normal.         Judgment: Judgment normal.         Fluids:  In: -   Out: 1460     LABS:  Recent Labs     05/28/22  1030 05/28/22  1718 05/29/22  0140   SODIUM 138 138 135   POTASSIUM 4.8 4.4 5.2   CHLORIDE 104 107 105   CO2 18* 22 23   GLUCOSE 96 103* 98   BUN 15 13 10   CREATININE 1.13 1.19 0.87   CALCIUM 8.7 8.6 7.8*       IMAGING:  - Imaging studies reviewed by me     IMPRESSION:  # Acute leukemia  - hematology managing  - on hydroxyurea, some response  - no e/o DIC  - flow cytometry pending     # Leukostasis  - WB>440,000-->389,000-->256,000  - has temp line for leukapheresis, started 5/28  - hydroxyurea     # TLS  - phos and uric acid elevated (now wnl), Ca low  - IVF, rasburicase, uloric     # JACOBY 2/2 TLS  - SCr 1.4 OSH, now ~1.2     # Neutropenic fever  - cefepime, isolation  - cultures pending     # Subsegmental PEs  - cautious heparin drip     # Thrombocytopenia  -worsening     # Anemia     # RLE pain  - 2/2 superficial  thrombosis     # Arrhythmia  - EKG with sinus tach 5/28  - on telemetry        PLAN:  - leukapheresis ordered daily until WBC<100,000  - have reviewed heparin drip with hematology, will hold while on leukapheresis, otherwise defer heparin to hematology  - daily labs  - continue IVF 5/29  - agree with rasburicase, uloric  - Dose all meds per eGFR  - agree with cefepime, abx, isolation  - Ca with leukapheresis        Other management per primary service/hematology  Nephrology will follow
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
## 2022-05-29 NOTE — PROGRESS NOTES
"NOTE HAS ADDENDUM TO REFLECT INFORMATION RECORDED ON PAPER ELIGIBILITY SUBMITTED TO [facility].  THIS INCLUDES \"N/A\" NEXT TO EXCLUSION CRITERIA.  PLEASE ALSO REFER TO ORIGINAL PROGRESS NOTE WRITTEN AND SIGNED 5/29/2022 FOR WHICH \"NO EXCLUSION CRITERIA MET.\" WAS EXPLICITLY WRITTEN.          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    ALL LABS OBTAINED FROM CURRENT ADMISSION AND < 7 DAYS FROM EXPECTED START OF SYSTEM THERAPY.  WILL VERIFY THAT NO LABS ARE OUTSIDE THE ACCEPTABLE DATE RANGES.    Diagnostic biopsy for B-LLy must be performed within 14 days prior to   enrollment. Imaging studies, if applicable, must be obtained within 14 days prior to   start of protocol therapy (repeat the tumor imaging if necessary).    N/A    3.2.1 Eligibility Screening    B-ALL and MPAL patients must be enrolled on [id number] and consented to   Eligibility studies (Part A) prior to treatment and enrollment on [id number].  Note that central confirmation of MPAL diagnosis must occur within 7 business   days after enrollment for MPAL patients. If not performed within this time   frame, patients will be taken off protocol. See Section 3.1.4 for timing details.  [id number] is not a requirement for B-LLy patients but for institutional   compliance every patient should be offered participation in [id number]. B-LLy   patients may directly enroll on [id number].    PLAN FOR ENROLLEMENT ON [id number] ON 5/29/2022 PRIOR TO ENROLLEMENT ON [id number]    3.2.2 Age at diagnosis    Patients must be > 365 days and < 25 " years of age    PATIENT  2000,  21 YEARS OF AGE AT DATE OF EXPECTED ENROLLMENT    3.2.3 White Blood Cell Count (WBC) Criteria     White Blood Cell Count (WBC) Criteria for patients with B-ALL  Age 1-9.99 years: WBC ? 50,000/µL  Age 10-24.99 years: Any WBC  Age 1-9.99 years: WBC < 50,000/µL with:  o Testicular leukemia (TBD)  o CNS leukemia (CNS3) (TBD)  o Steroid pretreatment (see Section 3.3.3) - none     White Blood Cell Count (WBC) Criteria for patients with MPAL  Age 1-24.99 years: any WBC    3.2.4 Diagnosis   Patient has newly diagnosed B-ALL or MPAL (by [id number] criteria) with   >25% blasts on a bone marrow (BM) aspirate;  OR If a BM aspirate is not obtained or is not diagnostic of acute leukemia,   the diagnosis can be established by a pathologic diagnosis of acute   leukemia on a BM biopsy;   OR A complete blood count (CBC) documenting the presence of at least   1,000/µL circulating leukemic cells if a bone marrow aspirate or biopsy   cannot be performed;  (CBC 2022 demonstrating  absolute blast count 256, 872 /uL)   OR Patient has newly diagnosed B-LLy Hammond Stages III or IV (See   Appendix VII for staging);   OR Patient has newly diagnosed B-LLy Hammond Stages I or II with steroid   pretreatment (See Section 3.3.3 for steroid pretreatment details).  Note: For B-LLy patients with tissue available for flow cytometry, the criterion for   diagnosis should be analogous to B-ALL. For tissue processed by other means   (i.e., paraffin blocks), the methodology and criteria for immunophenotypic analysis   to establish the diagnosis of B-LLy defined by the submitting institution will be   Accepted.    Will obtain confirmatory BMAB in AM 2022    3.2.5 Exclusion Criteria    3.2.5.1 Patients with Down syndrome are not eligible (patients with Down   syndrome and B-ALL are eligible for [id number], regardless of NCI risk   Group).    3.2.5.2 Prior Therapy  With the exception of steroid pretreatment (defined  in Section 3.3.3) or   the administration of intrathecal cytarabine, patients must not have   received any prior cytotoxic chemotherapy for the current diagnosis of   B-ALL, MPAL, or B-LLy or for any cancer diagnosed prior to initiation   of protocol therapy on [id number]. (Patient receiving cytoreductive therapy and leukophoresis for hyperleukocytosis x1 day - no other systemic chemotherapy)    3.2.5.3 Patients who have received > 72 hours of hydroxyurea within one week   prior to start of systemic protocol therapy. (Patient receiving cytoreductive therapy and leukophoresis for hyperleukocytosis x1 day - no other systemic chemotherapy)      3.2.5.4 Patients with B-ALL or MPAL who do not have sufficient diagnostic bone marrow submitted for [id number] testing and who do not have a peripheral blood sample submitted containing >1,000/µL circulating leukemia cells. PATIENT HAD SUFFICIENT BONE MARROW SUBMITTED FOR [id number] ([facility] 4/19/2023)    3.2.5.5 Patients with Acute Undifferentiated Leukemia (AUL) are not eligible. DIAGNOSIS OF PRECURSOR B-ALL PRIOR TO ENROLLMENT  ([facility] 4/19/2023)    3.2.5.6 For Hammond Stage III/IV B-LLy patients, or Stage I/II patients with   steroid pretreatment, the following additional exclusion criteria apply: PATIENT DOES NOT HAVE LYMPHOMA.   ([facility] 4/19/2023)   T-Lymphoblastic Lymphoma.   Morphologically unclassifiable lymphoma.   Absence of both B-cell and T-cell phenotype markers in a case   submitted as lymphoblastic lymphoma.    3.2.5.7 Patients with known Charcot-Gia-Tooth disease. PATIENT DOES NOT HAVE HISTORY OF CMT  ([facility] 4/19/2023)    3.2.5.8 Patients with known MYC translocation associated with mature ([place])   B-cell ALL, regardless of blast immunophenotype  PATIENT DOES NOT HAVE KNOWN MYC TRANSLOCATION AT TIME OF ENROLLMENT  ([facility] 4/19/2023)     3.2.5.9 Patients requiring radiation at diagnosis. PATIENT DOES NOT REQUIRE RADIATION AT TIME OF ENROLLMENT.   ([facility] 4/19/2023)    3.2.5.10  Female patients who are pregnant, since fetal toxicities and teratogenic   effects have been noted for several of the study drugs. A pregnancy test   is required for female patients of childbearing potential.  PATIENT IS MALE  ([facility] - 4/19/2023)    3.2.5.11 Lactating women who plan to breastfeed their infants while on study and   for 2 months after the last dose of inotuzumab ozogamicin.PATIENT IS MALE  ([facility] - 4/19/2023)    3.2.5.12 Sexually active patients of reproductive potential who have not agreed   to use an effective contraceptive method for the duration of study   participation. For those patients randomized to inotuzumab ozogamicin,   there is a minimum of 8 months after the last dose of inotuzumab   ozogamicin for females and 5 months after the last dose of inotuzumab   ozogamicin for males. PATIENT AGREES TO ABSTINENCE.  SEE PROGRESS NOTE 5/30/2022  PRIOR TO ENROLLMENT ([facility] - 4/19/2023)

## 2022-05-29 NOTE — PROGRESS NOTES
WBCD #1 ordered by Dr. Smith. Treatment started at 1415 and ended at 1700. Pt stable, vss, no c/o post tx. 1.5 TBV processed. Report to SAGAR Holden RN.

## 2022-05-29 NOTE — PROGRESS NOTES
Pt arrived to S412 with Dialysis RNs at bedside.     Pt alert and orientated, complaining of neck pain at catheter site (ongoing issues), no SOB or distressed expressed or noted.     Pt placed on PICU monitors and labs collected.     Heparin remains paused since beginning of dialysis. MD aware, pharmacy notified to new bag needed.     Pt and family member who is a bedside updated on plan of care.

## 2022-05-29 NOTE — PROGRESS NOTES
[facility] Progress Note    WBCD #2 ordered by Dr. Smith. Treatment started at 1239 and ended at 1548. Pt developed headache towards the end of tx, Dr Faye at bedside discussing plan of care with the Pt, Pt stable post tx. 1.5 TBV processed. Report to primary RN.

## 2022-05-30 ENCOUNTER — APPOINTMENT (OUTPATIENT)
Dept: RADIOLOGY | Facility: MEDICAL CENTER | Age: 22
DRG: 834 | End: 2022-05-30
Attending: NURSE PRACTITIONER
Payer: COMMERCIAL

## 2022-05-30 ENCOUNTER — APPOINTMENT (OUTPATIENT)
Dept: CARDIOLOGY | Facility: MEDICAL CENTER | Age: 22
DRG: 834 | End: 2022-05-30
Attending: PEDIATRICS
Payer: COMMERCIAL

## 2022-05-30 ENCOUNTER — APPOINTMENT (OUTPATIENT)
Dept: CARDIOLOGY | Facility: MEDICAL CENTER | Age: 22
DRG: 834 | End: 2022-05-30
Attending: NURSE PRACTITIONER
Payer: COMMERCIAL

## 2022-05-30 LAB
ABO GROUP BLD: NORMAL
ABO GROUP BLD: NORMAL
ALBUMIN SERPL BCP-MCNC: 2.6 G/DL (ref 3.2–4.9)
ALBUMIN SERPL BCP-MCNC: 2.9 G/DL (ref 3.2–4.9)
ALBUMIN/GLOB SERPL: 3.2 G/DL
ALP SERPL-CCNC: 63 U/L (ref 30–99)
ALT SERPL-CCNC: 22 U/L (ref 2–50)
ANION GAP SERPL CALC-SCNC: 12 MMOL/L (ref 7–16)
ANION GAP SERPL CALC-SCNC: 15 MMOL/L (ref 7–16)
ANISOCYTOSIS BLD QL SMEAR: ABNORMAL
ANISOCYTOSIS BLD QL SMEAR: ABNORMAL
AST SERPL-CCNC: 41 U/L (ref 12–45)
BARCODED ABORH UBTYP: 5100
BARCODED ABORH UBTYP: 6200
BARCODED PRD CODE UBPRD: NORMAL
BARCODED UNIT NUM UBUNT: NORMAL
BASOPHILS # BLD AUTO: 0 % (ref 0–1.8)
BASOPHILS # BLD AUTO: 0 % (ref 0–1.8)
BASOPHILS # BLD AUTO: 0.8 % (ref 0–1.8)
BASOPHILS # BLD: 0 K/UL (ref 0–0.12)
BASOPHILS # BLD: 0 K/UL (ref 0–0.12)
BASOPHILS # BLD: 1.16 K/UL (ref 0–0.12)
BILIRUB CONJ SERPL-MCNC: <0.2 MG/DL (ref 0.1–0.5)
BILIRUB INDIRECT SERPL-MCNC: NORMAL MG/DL (ref 0–1)
BILIRUB SERPL-MCNC: 0.3 MG/DL (ref 0.1–1.5)
BLASTS NFR BLD MANUAL: 61 %
BLASTS NFR BLD MANUAL: 64 %
BLASTS NFR BLD MANUAL: 73.2 %
BLD GP AB SCN SERPL QL: NORMAL
BUN SERPL-MCNC: 10 MG/DL (ref 8–22)
BUN SERPL-MCNC: 7 MG/DL (ref 8–22)
BUN SERPL-MCNC: 8 MG/DL (ref 8–22)
BUN SERPL-MCNC: 8 MG/DL (ref 8–22)
BURR CELLS/RBC NFR CSF MANUAL: 0 %
CA-I SERPL-SCNC: 1.1 MMOL/L (ref 1.1–1.3)
CALCIUM SERPL-MCNC: 7.7 MG/DL (ref 8.5–10.5)
CALCIUM SERPL-MCNC: 7.7 MG/DL (ref 8.5–10.5)
CALCIUM SERPL-MCNC: 7.8 MG/DL (ref 8.5–10.5)
CALCIUM SERPL-MCNC: 8 MG/DL (ref 8.5–10.5)
CHLORIDE SERPL-SCNC: 102 MMOL/L (ref 96–112)
CHLORIDE SERPL-SCNC: 104 MMOL/L (ref 96–112)
CLARITY CSF: CLEAR
CO2 SERPL-SCNC: 19 MMOL/L (ref 20–33)
CO2 SERPL-SCNC: 20 MMOL/L (ref 20–33)
CO2 SERPL-SCNC: 22 MMOL/L (ref 20–33)
CO2 SERPL-SCNC: 25 MMOL/L (ref 20–33)
COLOR CSF: COLORLESS
COLOR SPUN CSF: COLORLESS
COMPONENT P 8504P: NORMAL
COMPONENT R 8504R: NORMAL
CREAT SERPL-MCNC: 0.69 MG/DL (ref 0.5–1.4)
CREAT SERPL-MCNC: 0.77 MG/DL (ref 0.5–1.4)
CREAT SERPL-MCNC: 0.83 MG/DL (ref 0.5–1.4)
CREAT SERPL-MCNC: 0.86 MG/DL (ref 0.5–1.4)
CYTOLOGY REG CYTOL: NORMAL
D DIMER PPP IA.FEU-MCNC: 1.05 UG/ML (FEU) (ref 0–0.5)
EOSINOPHIL # BLD AUTO: 0 K/UL (ref 0–0.51)
EOSINOPHIL # BLD AUTO: 0 K/UL (ref 0–0.51)
EOSINOPHIL # BLD AUTO: 1.27 K/UL (ref 0–0.51)
EOSINOPHIL NFR BLD: 0 % (ref 0–6.9)
EOSINOPHIL NFR BLD: 0 % (ref 0–6.9)
EOSINOPHIL NFR BLD: 0.8 % (ref 0–6.9)
ERYTHROCYTE [DISTWIDTH] IN BLOOD BY AUTOMATED COUNT: 52.4 FL (ref 35.9–50)
ERYTHROCYTE [DISTWIDTH] IN BLOOD BY AUTOMATED COUNT: 53.2 FL (ref 35.9–50)
ERYTHROCYTE [DISTWIDTH] IN BLOOD BY AUTOMATED COUNT: 53.3 FL (ref 35.9–50)
ERYTHROCYTE [DISTWIDTH] IN BLOOD BY AUTOMATED COUNT: 55.6 FL (ref 35.9–50)
FIBRINOGEN PPP-MCNC: 314 MG/DL (ref 215–460)
FIBRINOGEN PPP-MCNC: 366 MG/DL (ref 215–460)
FIBRINOGEN PPP-MCNC: 421 MG/DL (ref 215–460)
GFR SERPLBLD CREATININE-BSD FMLA CKD-EPI: 127 ML/MIN/1.73 M 2
GFR SERPLBLD CREATININE-BSD FMLA CKD-EPI: 128 ML/MIN/1.73 M 2
GFR SERPLBLD CREATININE-BSD FMLA CKD-EPI: 131 ML/MIN/1.73 M 2
GFR SERPLBLD CREATININE-BSD FMLA CKD-EPI: 135 ML/MIN/1.73 M 2
GLOBULIN SER CALC-MCNC: 0.9 G/DL (ref 1.9–3.5)
GLUCOSE SERPL-MCNC: 103 MG/DL (ref 65–99)
GLUCOSE SERPL-MCNC: 82 MG/DL (ref 65–99)
GLUCOSE SERPL-MCNC: 90 MG/DL (ref 65–99)
GLUCOSE SERPL-MCNC: 95 MG/DL (ref 65–99)
HCT VFR BLD AUTO: 20.1 % (ref 42–52)
HCT VFR BLD AUTO: 22.2 % (ref 42–52)
HCT VFR BLD AUTO: 25.9 % (ref 42–52)
HCT VFR BLD AUTO: 25.9 % (ref 42–52)
HGB BLD-MCNC: 6.5 G/DL (ref 14–18)
HGB BLD-MCNC: 7.1 G/DL (ref 14–18)
HGB BLD-MCNC: 8 G/DL (ref 14–18)
HGB BLD-MCNC: 8.5 G/DL (ref 14–18)
LIPASE SERPL-CCNC: 62 U/L (ref 11–82)
LV EJECT FRACT  99904: 75
LV EJECT FRACT MOD 2C 99903: 75.54
LYMPHOCYTES # BLD AUTO: 16.12 K/UL (ref 1–4.8)
LYMPHOCYTES # BLD AUTO: 36.2 K/UL (ref 1–4.8)
LYMPHOCYTES # BLD AUTO: 51.29 K/UL (ref 1–4.8)
LYMPHOCYTES NFR BLD: 22.3 % (ref 22–41)
LYMPHOCYTES NFR BLD: 25 % (ref 22–41)
LYMPHOCYTES NFR BLD: 32.4 % (ref 22–41)
LYMPHOCYTES NFR CSF: 55 %
MANUAL DIFF BLD: ABNORMAL
MCH RBC QN AUTO: 29.5 PG (ref 27–33)
MCH RBC QN AUTO: 29.5 PG (ref 27–33)
MCH RBC QN AUTO: 29.7 PG (ref 27–33)
MCH RBC QN AUTO: 29.9 PG (ref 27–33)
MCHC RBC AUTO-ENTMCNC: 30.9 G/DL (ref 33.7–35.3)
MCHC RBC AUTO-ENTMCNC: 32 G/DL (ref 33.7–35.3)
MCHC RBC AUTO-ENTMCNC: 32.3 G/DL (ref 33.7–35.3)
MCHC RBC AUTO-ENTMCNC: 32.8 G/DL (ref 33.7–35.3)
MCV RBC AUTO: 91.2 FL (ref 81.4–97.8)
MCV RBC AUTO: 91.8 FL (ref 81.4–97.8)
MCV RBC AUTO: 92.1 FL (ref 81.4–97.8)
MCV RBC AUTO: 95.6 FL (ref 81.4–97.8)
METAMYELOCYTES NFR BLD MANUAL: 0.8 %
MICROCYTES BLD QL SMEAR: ABNORMAL
MICROCYTES BLD QL SMEAR: ABNORMAL
MONOCYTES # BLD AUTO: 0 K/UL (ref 0–0.85)
MONOCYTES # BLD AUTO: 1.11 K/UL (ref 0–0.85)
MONOCYTES # BLD AUTO: 7.24 K/UL (ref 0–0.85)
MONOCYTES NFR BLD AUTO: 0 % (ref 0–13.4)
MONOCYTES NFR BLD AUTO: 0.7 % (ref 0–13.4)
MONOCYTES NFR BLD AUTO: 5 % (ref 0–13.4)
MONONUC CELLS NFR CSF: 30 %
MORPHOLOGY BLD-IMP: NORMAL
NEUTROPHILS # BLD AUTO: 3.25 K/UL (ref 1.82–7.42)
NEUTROPHILS # BLD AUTO: 6.08 K/UL (ref 1.82–7.42)
NEUTROPHILS # BLD AUTO: 8.07 K/UL (ref 1.82–7.42)
NEUTROPHILS NFR BLD: 3.4 % (ref 44–72)
NEUTROPHILS NFR BLD: 4.4 % (ref 44–72)
NEUTROPHILS NFR BLD: 4.5 % (ref 44–72)
NEUTROPHILS NFR CSF: 15 %
NEUTS BAND NFR BLD MANUAL: 0.7 % (ref 0–10)
NEUTS BAND NFR BLD MANUAL: 0.8 % (ref 0–10)
NRBC # BLD AUTO: 0.06 K/UL
NRBC # BLD AUTO: 0.07 K/UL
NRBC # BLD AUTO: 0.09 K/UL
NRBC BLD-RTO: 0 /100 WBC
NRBC BLD-RTO: 0.1 /100 WBC
NRBC BLD-RTO: 0.1 /100 WBC
PHOSPHATE SERPL-MCNC: 2.5 MG/DL (ref 2.5–4.5)
PHOSPHATE SERPL-MCNC: 3.1 MG/DL (ref 2.5–4.5)
PHOSPHATE SERPL-MCNC: 3.6 MG/DL (ref 2.5–4.5)
PLATELET # BLD AUTO: 22 K/UL (ref 164–446)
PLATELET # BLD AUTO: 28 K/UL (ref 164–446)
PLATELET # BLD AUTO: 50 K/UL (ref 164–446)
PLATELET # BLD AUTO: 53 K/UL (ref 164–446)
PLATELET BLD QL SMEAR: NORMAL
PMV BLD AUTO: 10.1 FL (ref 9–12.9)
PMV BLD AUTO: 8.8 FL (ref 9–12.9)
PMV BLD AUTO: 8.8 FL (ref 9–12.9)
PMV BLD AUTO: 9.2 FL (ref 9–12.9)
POIKILOCYTOSIS BLD QL SMEAR: NORMAL
POTASSIUM SERPL-SCNC: 3.8 MMOL/L (ref 3.6–5.5)
POTASSIUM SERPL-SCNC: 3.8 MMOL/L (ref 3.6–5.5)
POTASSIUM SERPL-SCNC: 4.6 MMOL/L (ref 3.6–5.5)
POTASSIUM SERPL-SCNC: 5 MMOL/L (ref 3.6–5.5)
PRODUCT TYPE UPROD: NORMAL
PROT SERPL-MCNC: 3.8 G/DL (ref 6–8.2)
RBC # BLD AUTO: 2.19 M/UL (ref 4.7–6.1)
RBC # BLD AUTO: 2.41 M/UL (ref 4.7–6.1)
RBC # BLD AUTO: 2.71 M/UL (ref 4.7–6.1)
RBC # BLD AUTO: 2.84 M/UL (ref 4.7–6.1)
RBC # CSF: 3 CELLS/UL
RBC BLD AUTO: NORMAL
RBC BLD AUTO: PRESENT
RBC BLD AUTO: PRESENT
RH BLD: NORMAL
RH BLD: NORMAL
SCHISTOCYTES BLD QL SMEAR: NORMAL
SODIUM SERPL-SCNC: 134 MMOL/L (ref 135–145)
SODIUM SERPL-SCNC: 136 MMOL/L (ref 135–145)
SODIUM SERPL-SCNC: 138 MMOL/L (ref 135–145)
SODIUM SERPL-SCNC: 139 MMOL/L (ref 135–145)
SPECIMEN VOL CSF: NORMAL ML
TUBE # CSF: 2
TUBE # CSF: 2
UFH PPP CHRO-ACNC: 0.17 IU/ML
UFH PPP CHRO-ACNC: 0.33 IU/ML
UFH PPP CHRO-ACNC: <0.1 IU/ML
UNIT STATUS USTAT: NORMAL
URATE SERPL-MCNC: 2.6 MG/DL (ref 2.5–8.3)
URATE SERPL-MCNC: 2.7 MG/DL (ref 2.5–8.3)
URATE SERPL-MCNC: 3 MG/DL (ref 2.5–8.3)
WBC # BLD AUTO: 144.8 K/UL (ref 4.8–10.8)
WBC # BLD AUTO: 158.3 K/UL (ref 4.8–10.8)
WBC # BLD AUTO: 71.3 K/UL (ref 4.8–10.8)
WBC # BLD AUTO: 72.3 K/UL (ref 4.8–10.8)
WBC # CSF: 1 CELLS/UL (ref 0–10)

## 2022-05-30 PROCEDURE — P9016 RBC LEUKOCYTES REDUCED: HCPCS

## 2022-05-30 PROCEDURE — 3E0R305 INTRODUCTION OF OTHER ANTINEOPLASTIC INTO SPINAL CANAL, PERCUTANEOUS APPROACH: ICD-10-PCS | Performed by: PEDIATRICS

## 2022-05-30 PROCEDURE — 86923 COMPATIBILITY TEST ELECTRIC: CPT

## 2022-05-30 PROCEDURE — 80048 BASIC METABOLIC PNL TOTAL CA: CPT | Mod: 91

## 2022-05-30 PROCEDURE — 700102 HCHG RX REV CODE 250 W/ 637 OVERRIDE(OP): Performed by: PEDIATRICS

## 2022-05-30 PROCEDURE — P9034 PLATELETS, PHERESIS: HCPCS | Mod: 91

## 2022-05-30 PROCEDURE — 700105 HCHG RX REV CODE 258: Performed by: PEDIATRICS

## 2022-05-30 PROCEDURE — 85027 COMPLETE CBC AUTOMATED: CPT

## 2022-05-30 PROCEDURE — 88184 FLOWCYTOMETRY/ TC 1 MARKER: CPT

## 2022-05-30 PROCEDURE — 85379 FIBRIN DEGRADATION QUANT: CPT

## 2022-05-30 PROCEDURE — 88264 CHROMOSOME ANALYSIS 20-25: CPT

## 2022-05-30 PROCEDURE — 85384 FIBRINOGEN ACTIVITY: CPT | Mod: 91

## 2022-05-30 PROCEDURE — A9270 NON-COVERED ITEM OR SERVICE: HCPCS | Performed by: INTERNAL MEDICINE

## 2022-05-30 PROCEDURE — 700102 HCHG RX REV CODE 250 W/ 637 OVERRIDE(OP): Performed by: INTERNAL MEDICINE

## 2022-05-30 PROCEDURE — 85520 HEPARIN ASSAY: CPT

## 2022-05-30 PROCEDURE — 700111 HCHG RX REV CODE 636 W/ 250 OVERRIDE (IP): Performed by: PEDIATRICS

## 2022-05-30 PROCEDURE — 99233 SBSQ HOSP IP/OBS HIGH 50: CPT | Mod: 25 | Performed by: PEDIATRICS

## 2022-05-30 PROCEDURE — 36569 INSJ PICC 5 YR+ W/O IMAGING: CPT

## 2022-05-30 PROCEDURE — 88341 IMHCHEM/IMCYTCHM EA ADD ANTB: CPT | Mod: 91

## 2022-05-30 PROCEDURE — 88360 TUMOR IMMUNOHISTOCHEM/MANUAL: CPT

## 2022-05-30 PROCEDURE — 96450 CHEMOTHERAPY INTO CNS: CPT

## 2022-05-30 PROCEDURE — 83690 ASSAY OF LIPASE: CPT

## 2022-05-30 PROCEDURE — 700111 HCHG RX REV CODE 636 W/ 250 OVERRIDE (IP): Performed by: NURSE PRACTITIONER

## 2022-05-30 PROCEDURE — 36430 TRANSFUSION BLD/BLD COMPNT: CPT

## 2022-05-30 PROCEDURE — 89051 BODY FLUID CELL COUNT: CPT

## 2022-05-30 PROCEDURE — 82248 BILIRUBIN DIRECT: CPT

## 2022-05-30 PROCEDURE — 84100 ASSAY OF PHOSPHORUS: CPT

## 2022-05-30 PROCEDURE — 86945 BLOOD PRODUCT/IRRADIATION: CPT | Mod: 91

## 2022-05-30 PROCEDURE — 700101 HCHG RX REV CODE 250

## 2022-05-30 PROCEDURE — 36511 APHERESIS WBC: CPT

## 2022-05-30 PROCEDURE — 38222 DX BONE MARROW BX & ASPIR: CPT | Performed by: PEDIATRICS

## 2022-05-30 PROCEDURE — 770001 HCHG ROOM/CARE - MED/SURG/GYN PRIV*

## 2022-05-30 PROCEDURE — 009U3ZX DRAINAGE OF SPINAL CANAL, PERCUTANEOUS APPROACH, DIAGNOSTIC: ICD-10-PCS | Performed by: PEDIATRICS

## 2022-05-30 PROCEDURE — 88185 FLOWCYTOMETRY/TC ADD-ON: CPT

## 2022-05-30 PROCEDURE — 88291 CYTO/MOLECULAR REPORT: CPT

## 2022-05-30 PROCEDURE — 700101 HCHG RX REV CODE 250: Performed by: STUDENT IN AN ORGANIZED HEALTH CARE EDUCATION/TRAINING PROGRAM

## 2022-05-30 PROCEDURE — 96450 CHEMOTHERAPY INTO CNS: CPT | Performed by: PEDIATRICS

## 2022-05-30 PROCEDURE — 86850 RBC ANTIBODY SCREEN: CPT

## 2022-05-30 PROCEDURE — C1751 CATH, INF, PER/CENT/MIDLINE: HCPCS

## 2022-05-30 PROCEDURE — 700111 HCHG RX REV CODE 636 W/ 250 OVERRIDE (IP): Performed by: STUDENT IN AN ORGANIZED HEALTH CARE EDUCATION/TRAINING PROGRAM

## 2022-05-30 PROCEDURE — 86901 BLOOD TYPING SEROLOGIC RH(D): CPT

## 2022-05-30 PROCEDURE — A9270 NON-COVERED ITEM OR SERVICE: HCPCS | Performed by: PEDIATRICS

## 2022-05-30 PROCEDURE — 700111 HCHG RX REV CODE 636 W/ 250 OVERRIDE (IP): Performed by: INTERNAL MEDICINE

## 2022-05-30 PROCEDURE — 38221 DX BONE MARROW BIOPSIES: CPT | Mod: RT

## 2022-05-30 PROCEDURE — 30233R1 TRANSFUSION OF NONAUTOLOGOUS PLATELETS INTO PERIPHERAL VEIN, PERCUTANEOUS APPROACH: ICD-10-PCS | Performed by: PEDIATRICS

## 2022-05-30 PROCEDURE — 88271 CYTOGENETICS DNA PROBE: CPT | Mod: 91

## 2022-05-30 PROCEDURE — 85007 BL SMEAR W/DIFF WBC COUNT: CPT

## 2022-05-30 PROCEDURE — 80069 RENAL FUNCTION PANEL: CPT

## 2022-05-30 PROCEDURE — 85025 COMPLETE CBC W/AUTO DIFF WBC: CPT | Mod: 91

## 2022-05-30 PROCEDURE — 88275 CYTOGENETICS 100-300: CPT | Mod: 91

## 2022-05-30 PROCEDURE — 88305 TISSUE EXAM BY PATHOLOGIST: CPT

## 2022-05-30 PROCEDURE — 84550 ASSAY OF BLOOD/URIC ACID: CPT

## 2022-05-30 PROCEDURE — 93306 TTE W/DOPPLER COMPLETE: CPT | Mod: 26 | Performed by: INTERNAL MEDICINE

## 2022-05-30 PROCEDURE — 88237 TISSUE CULTURE BONE MARROW: CPT

## 2022-05-30 PROCEDURE — 88311 DECALCIFY TISSUE: CPT

## 2022-05-30 PROCEDURE — 80053 COMPREHEN METABOLIC PANEL: CPT

## 2022-05-30 PROCEDURE — 93306 TTE W/DOPPLER COMPLETE: CPT

## 2022-05-30 PROCEDURE — 88342 IMHCHEM/IMCYTCHM 1ST ANTB: CPT

## 2022-05-30 PROCEDURE — 700105 HCHG RX REV CODE 258: Performed by: NURSE PRACTITIONER

## 2022-05-30 PROCEDURE — 07DR3ZX EXTRACTION OF ILIAC BONE MARROW, PERCUTANEOUS APPROACH, DIAGNOSTIC: ICD-10-PCS | Performed by: PEDIATRICS

## 2022-05-30 PROCEDURE — 82330 ASSAY OF CALCIUM: CPT

## 2022-05-30 PROCEDURE — 88108 CYTOPATH CONCENTRATE TECH: CPT

## 2022-05-30 RX ORDER — ONDANSETRON 2 MG/ML
8 INJECTION INTRAMUSCULAR; INTRAVENOUS ONCE
Status: COMPLETED | OUTPATIENT
Start: 2022-05-30 | End: 2022-05-30

## 2022-05-30 RX ORDER — LORAZEPAM 2 MG/ML
2 INJECTION INTRAMUSCULAR ONCE
Status: COMPLETED | OUTPATIENT
Start: 2022-05-30 | End: 2022-05-30

## 2022-05-30 RX ORDER — DEXTROSE MONOHYDRATE, SODIUM CITRATE, AND CITRIC ACID MONOHYDRATE 2.45; 2.2; .8 G/100ML; G/100ML; G/100ML
INJECTION, SOLUTION INTRAVENOUS
Status: COMPLETED
Start: 2022-05-30 | End: 2022-05-30

## 2022-05-30 RX ORDER — SULFAMETHOXAZOLE AND TRIMETHOPRIM 400; 80 MG/1; MG/1
2 TABLET ORAL
Status: DISCONTINUED | OUTPATIENT
Start: 2022-06-04 | End: 2022-06-04 | Stop reason: HOSPADM

## 2022-05-30 RX ORDER — FAMOTIDINE 20 MG/1
20 TABLET, FILM COATED ORAL 2 TIMES DAILY
Status: DISCONTINUED | OUTPATIENT
Start: 2022-05-30 | End: 2022-06-04 | Stop reason: HOSPADM

## 2022-05-30 RX ORDER — KETAMINE HYDROCHLORIDE 50 MG/ML
50 INJECTION, SOLUTION INTRAMUSCULAR; INTRAVENOUS
Status: COMPLETED | OUTPATIENT
Start: 2022-05-30 | End: 2022-05-30

## 2022-05-30 RX ORDER — PROMETHAZINE HYDROCHLORIDE 25 MG/1
25 TABLET ORAL EVERY 6 HOURS PRN
Status: DISCONTINUED | OUTPATIENT
Start: 2022-05-30 | End: 2022-06-04 | Stop reason: HOSPADM

## 2022-05-30 RX ORDER — ONDANSETRON 2 MG/ML
8 INJECTION INTRAMUSCULAR; INTRAVENOUS EVERY 8 HOURS PRN
Status: DISCONTINUED | OUTPATIENT
Start: 2022-05-30 | End: 2022-06-04 | Stop reason: HOSPADM

## 2022-05-30 RX ORDER — LIDOCAINE AND PRILOCAINE 25; 25 MG/G; MG/G
1 CREAM TOPICAL PRN
Status: DISCONTINUED | OUTPATIENT
Start: 2022-05-30 | End: 2022-06-04 | Stop reason: HOSPADM

## 2022-05-30 RX ORDER — ONDANSETRON 2 MG/ML
8 INJECTION INTRAMUSCULAR; INTRAVENOUS EVERY 8 HOURS
Status: DISCONTINUED | OUTPATIENT
Start: 2022-05-30 | End: 2022-05-30

## 2022-05-30 RX ORDER — SODIUM CHLORIDE 9 MG/ML
500 INJECTION, SOLUTION INTRAVENOUS CONTINUOUS
Status: CANCELLED | OUTPATIENT
Start: 2022-05-31

## 2022-05-30 RX ORDER — LIDOCAINE AND PRILOCAINE 25; 25 MG/G; MG/G
CREAM TOPICAL PRN
Status: CANCELLED | OUTPATIENT
Start: 2022-05-31

## 2022-05-30 RX ORDER — LORAZEPAM 2 MG/ML
2 INJECTION INTRAMUSCULAR EVERY 6 HOURS PRN
Status: DISCONTINUED | OUTPATIENT
Start: 2022-05-30 | End: 2022-06-04 | Stop reason: HOSPADM

## 2022-05-30 RX ORDER — HEPARIN SODIUM (PORCINE) LOCK FLUSH IV SOLN 100 UNIT/ML 100 UNIT/ML
200 SOLUTION INTRAVENOUS EVERY 6 HOURS
Status: DISCONTINUED | OUTPATIENT
Start: 2022-05-30 | End: 2022-06-04 | Stop reason: HOSPADM

## 2022-05-30 RX ORDER — PREDNISONE 1 MG/1
1 TABLET ORAL DAILY
Status: CANCELLED | OUTPATIENT
Start: 2022-05-30

## 2022-05-30 RX ORDER — CALCIUM GLUCONATE 94 MG/ML
4 INJECTION, SOLUTION INTRAVENOUS ONCE
Status: COMPLETED | OUTPATIENT
Start: 2022-05-30 | End: 2022-05-30

## 2022-05-30 RX ORDER — ONDANSETRON 2 MG/ML
8 INJECTION INTRAMUSCULAR; INTRAVENOUS ONCE
Status: DISCONTINUED | OUTPATIENT
Start: 2022-05-30 | End: 2022-05-30

## 2022-05-30 RX ORDER — SODIUM CHLORIDE 9 MG/ML
1000 INJECTION, SOLUTION INTRAVENOUS ONCE
Status: ACTIVE | OUTPATIENT
Start: 2022-05-30 | End: 2022-05-31

## 2022-05-30 RX ORDER — ONDANSETRON 2 MG/ML
8 INJECTION INTRAMUSCULAR; INTRAVENOUS EVERY 8 HOURS
Status: COMPLETED | OUTPATIENT
Start: 2022-05-31 | End: 2022-06-01

## 2022-05-30 RX ADMIN — KETAMINE HYDROCHLORIDE 25 MG: 50 INJECTION INTRAMUSCULAR; INTRAVENOUS at 14:52

## 2022-05-30 RX ADMIN — ONDANSETRON HYDROCHLORIDE 8 MG: 2 SOLUTION INTRAMUSCULAR; INTRAVENOUS at 19:11

## 2022-05-30 RX ADMIN — KETAMINE HYDROCHLORIDE 25 MG: 50 INJECTION INTRAMUSCULAR; INTRAVENOUS at 14:15

## 2022-05-30 RX ADMIN — LIDOCAINE AND PRILOCAINE 1 APPLICATION: 25; 25 CREAM TOPICAL at 13:24

## 2022-05-30 RX ADMIN — KETAMINE HYDROCHLORIDE 25 MG: 50 INJECTION INTRAMUSCULAR; INTRAVENOUS at 14:11

## 2022-05-30 RX ADMIN — SENNOSIDES AND DOCUSATE SODIUM 2 TABLET: 50; 8.6 TABLET ORAL at 19:57

## 2022-05-30 RX ADMIN — KETAMINE HYDROCHLORIDE 25 MG: 50 INJECTION INTRAMUSCULAR; INTRAVENOUS at 14:05

## 2022-05-30 RX ADMIN — CALCIUM GLUCONATE 4 G: 98 INJECTION, SOLUTION INTRAVENOUS at 09:08

## 2022-05-30 RX ADMIN — CEFEPIME 2 G: 2 INJECTION, POWDER, FOR SOLUTION INTRAVENOUS at 23:37

## 2022-05-30 RX ADMIN — KETAMINE HYDROCHLORIDE 50 MG: 50 INJECTION INTRAMUSCULAR; INTRAVENOUS at 14:00

## 2022-05-30 RX ADMIN — FEBUXOSTAT 120 MG: 40 TABLET, FILM COATED ORAL at 06:14

## 2022-05-30 RX ADMIN — HYDROXYUREA 1500 MG: 500 CAPSULE ORAL at 06:14

## 2022-05-30 RX ADMIN — SENNOSIDES AND DOCUSATE SODIUM 2 TABLET: 50; 8.6 TABLET ORAL at 06:13

## 2022-05-30 RX ADMIN — CEFEPIME 2 G: 2 INJECTION, POWDER, FOR SOLUTION INTRAVENOUS at 06:13

## 2022-05-30 RX ADMIN — SODIUM CHLORIDE: 9 INJECTION, SOLUTION INTRAVENOUS at 03:55

## 2022-05-30 RX ADMIN — KETAMINE HYDROCHLORIDE 25 MG: 50 INJECTION INTRAMUSCULAR; INTRAVENOUS at 14:03

## 2022-05-30 RX ADMIN — KETAMINE HYDROCHLORIDE 50 MG: 50 INJECTION INTRAMUSCULAR; INTRAVENOUS at 14:23

## 2022-05-30 RX ADMIN — PREDNISONE 60 MG: 10 TABLET ORAL at 20:01

## 2022-05-30 RX ADMIN — VINCRISTINE SULFATE 2 MG: 1 INJECTION, SOLUTION INTRAVENOUS at 22:21

## 2022-05-30 RX ADMIN — CEFEPIME 2 G: 2 INJECTION, POWDER, FOR SOLUTION INTRAVENOUS at 15:30

## 2022-05-30 RX ADMIN — DEXTROSE MONOHYDRATE, SODIUM CITRATE, AND CITRIC ACID MONOHYDRATE 750 ML: 2.45; 2.2; .8 INJECTION, SOLUTION INTRAVENOUS at 09:08

## 2022-05-30 RX ADMIN — CYTARABINE 70 MG: 20 INJECTION, SOLUTION INTRATHECAL; INTRAVENOUS; SUBCUTANEOUS at 14:00

## 2022-05-30 RX ADMIN — LORAZEPAM 2 MG: 2 INJECTION INTRAMUSCULAR; INTRAVENOUS at 13:46

## 2022-05-30 RX ADMIN — DAUNORUBICIN HYDROCHLORIDE 47.5 MG: 5 INJECTION, SOLUTION INTRAVENOUS at 22:50

## 2022-05-30 RX ADMIN — FAMOTIDINE 20 MG: 20 TABLET, FILM COATED ORAL at 19:58

## 2022-05-30 RX ADMIN — KETAMINE HYDROCHLORIDE 25 MG: 50 INJECTION INTRAMUSCULAR; INTRAVENOUS at 14:26

## 2022-05-30 ASSESSMENT — PAIN DESCRIPTION - PAIN TYPE
TYPE: ACUTE PAIN

## 2022-05-30 ASSESSMENT — FIBROSIS 4 INDEX: FIB4 SCORE: 3.29

## 2022-05-30 NOTE — PROGRESS NOTES
[facility] Progress Note      Leukopheresis treatment #3 ordered today by Dr. Hernandez.  1.5 TBV. Treatment started at 0908 and ended at 1108.      Patient tolerated treatment well without difficulties, see flow sheet for details.      CVC patent, locked with ACD-A according to amount in designated port and applied end caps. CVC dressing CDI with no s/s of infection present.      Report given to primary RN.

## 2022-05-30 NOTE — PROGRESS NOTES
"[facility] -  PROGRESS NOTE               Author: Nickolas Hernandez M.D. Date & Time: 5/30/2022  8:39 AM     HISTORY OF PRESENT ILLNESS:    22yo male transferred from [facility] for leukapheresis for newly diagnosed ALL. He was seen by hematology at [facility]. Had complaint of LE pain and edema after travel, sent in for evaluation for DVT. He also reported SOB to hematology, now improved per patient. CTPE demonstrated subsegmental PEs. +Superficial RLE thrombosis. Heparin drip has been ordered.      Patient had temp line placed this AM, developed arrhythmia while in radiology, transferred to telemetry for monitoring. EKG with sinus tachycardia.      Patient is somnolent, but arouses easily and answers questions. BM biopsy planned for next week. Flow cytometry pending. Hydroxyurea and uloric have been started.      No C/N/V/CP/SOB. +fevers. +fatigue. No melena, hematochezia, hematemesis.  No HA, visual changes, or abdominal pain.    DAILY NEPHROLOGY SUMMARY:  05/28 - consult done, first leukapheresis done  05/29 - no new c/o, WBC trending down, SCr improved, phos and uric acid wnl, LE edema improved, c/o discomfort at temp line insertion site  5/30: Seen on pheresis-tolerating procedure well, generalized malaise, pending LP and BMB    REVIEW OF SYSTEMS:    10 point ROS reviewed and is as per HPI/daily summary or otherwise negative    PMH/PSH/SH/FH: Reviewed and unchanged since admission note  CURRENT MEDICATIONS: Reviewed from admission to present day    VS:  /52   Pulse (!) 103   Temp 37.8 °C (100 °F) (Temporal)   Resp (!) 21   Ht 1.651 m (5' 5\")   Wt 77.3 kg (170 lb 6.7 oz)   SpO2 96%   BMI 28.36 kg/m²   Physical Exam  Vitals and nursing note reviewed.   Constitutional:       General: He is not in acute distress.     Appearance: Normal appearance.   HENT:      Head: Normocephalic and atraumatic.      Right Ear: External ear normal.      Left Ear: External ear normal.      Nose: Nose " normal.      Mouth/Throat:      Mouth: Mucous membranes are moist.      Pharynx: Oropharynx is clear.   Eyes:      General: No scleral icterus.        Right eye: No discharge.         Left eye: No discharge.      Extraocular Movements: Extraocular movements intact.      Conjunctiva/sclera: Conjunctivae normal.   Cardiovascular:      Rate and Rhythm: Normal rate and regular rhythm.   Pulmonary:      Effort: Pulmonary effort is normal.      Breath sounds: No wheezing.   Abdominal:      General: Abdomen is flat. There is no distension.      Palpations: Abdomen is soft.      Tenderness: There is no abdominal tenderness.   Musculoskeletal:      Right lower leg: Edema present.      Left lower leg: No edema (trace).   Skin:     General: Skin is warm and dry.      Coloration: Skin is not jaundiced.      Findings: No bruising.   Neurological:      General: No focal deficit present.      Mental Status: He is alert and oriented to person, place, and time.      Cranial Nerves: No cranial nerve deficit.   Psychiatric:         Mood and Affect: Mood normal.         Thought Content: Thought content normal.         Judgment: Judgment normal.         Fluids:  In: 1934.7 [P.O.:120; I.V.:1774.7]  Out: 1300     LABS:  Recent Labs     05/29/22  1644 05/30/22  0010 05/30/22  0735   SODIUM 136 138 134*   POTASSIUM 4.5 3.8 4.6   CHLORIDE 102 104 102   CO2 25 22 20   GLUCOSE 103* 103* 95   BUN 6* 10 8   CREATININE 0.87 0.83 0.77   CALCIUM 8.2* 7.7* 7.8*       IMAGING:  - Imaging studies reviewed by me     IMPRESSION:  # Acute leukemia  - hematology managing  - on hydroxyurea, some response  - no e/o DIC  - flow cytometry pending     # Leukostasis  - Improving, to 144 this am  - has temp line for leukapheresis, started 5/28  - hydroxyurea     # TLS  - phos and uric acid elevated (now wnl), Ca low  - IVF, rasburicase, uloric     # JACOBY 2/2 TLS  - resolved     # Neutropenic fever  - cefepime, isolation  - cultures pending     # Subsegmental  PEs  - cautious heparin drip     # Thrombocytopenia     # Anemia     # RLE pain  - 2/2 superficial thrombosis     # Arrhythmia  - EKG with sinus tach 5/28  - on telemetry        PLAN:  -Leukapheresis today, odaily until WBC<100,000  -Heparin drip on hold while on leukapheresis, otherwise defer heparin to hematology  -Continue IVF  -Agree with rasburicase, uloric  -Daily TLS labs, will need to increase frequency one chemo started  -Dose all meds per eGFR  -abx per primary   -Ca with leukapheresis      Thank you

## 2022-05-30 NOTE — PROGRESS NOTES
Critical lac received from hematology tech of .8. Dr. Faye notified, no further orders at this time.

## 2022-05-30 NOTE — PROGRESS NOTES
"Pharmacy Chemotherapy calculation:    DX: HR B-ALL    Cycle: Induction   Day 1  Previous treatment = n/a    Protocol: [facility] [id number] (enrolled on [id number])       Raymond GANDHI, O’Valdemar MM, et al. [id number]     /71   Pulse (!) 109   Temp 36.3 °C (97.3 °F)   Resp 19   Ht 1.651 m (5' 5\")   Wt 78.4 kg (172 lb 13.5 oz)   SpO2 99%   BMI 28.76 kg/m²   Body surface area is 1.9 meters squared.     CVC in place for vesicant administration- ok to use IJ line for chemo.    All lab results 5/30/22 reviewed. Plt transfusion prior to LP.  MD aware of all current lab results. Orders received to proceed with treatment.  5/30/22- ECHO - LVEF ~ 75%      Cytarabine 70mg fixed dose    No calculation required   ok to treat with final dose = 70mg INTRATHECAL    vincristine 1.5 mg/m² x 1.9m² = >2 mg              < 10 % difference, okay to treat with final dose = 2 mg IV     daunorubicin 25 mg/m² x 1.9 m² = 47.5 mg              < 10 % difference, okay to treat with final dose = 47.5 mg IV        JAE Wilson Pharm.D.      "

## 2022-05-30 NOTE — PROCEDURES
Pediatric Oncology   Procedure Note      Patient Name:  Tomas Jean-Baptiste  : 2000   MRN: [id number]    Service Location:  [facility]  Date of Service: 2022  Time: 1:58 PM    Procedure Performed By: Pepe Faye M.D.    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 Hernandez)     Intrathecal Chemotherapy:  Yes    Chemotherapy Administered:  Cytarabine 70 mg IT (in 6 mL NS)    Needle Size:  22 gauge, 3.5 in  Site: L3-L4  Number of Attempts: 1  Fluid:  6 ml clear fluid obtained  Labs: Cell count, cytology    Complications:  None    Procedure Note:    Tomas Jean-Baptiste is a 21 y.o. 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  He is currently hospitalized for his leukemia undergoing complete work-up and scheduled to begin with his treatment today.  Today, as part of his diagnostic work-up and to assess CNS status a lumbar puncture was performed, CSF cytology and cell count obtained and 70 mg of intrathecal cytarabine were administered.  Prior to the procedure, the risks and benefits were discussed with the patient and family.  Consent for the procedure was signed by patient himself and placed in the medical chart.  All pertinent labs and history were reviewed and a complete History and Physical Examination were performed and placed in the medical record.  Tomas remained in his PICU room for the procedure but [place].  All necessary safety equipment per ASA  guidelines were available.  A time-out was performed and the patient identified by name,  and medical record number.  Gloves and mask were worn during the entire procedure.  Tomas Roy was prepped and draped in the usual sterile fashion with povoiodine.  He was positioned in the left decubitus position and all landmarks including superior posterior iliac crest, umbilicus and vertebral bodies were identified by palpation.  A 3.5 in, 22 gauge spinal needle was introduced into the L3-L4 spinal interspace.  6 ml of clear fluid were obtained and sent for cell count and cytology.  Cytarabine 70 mg 1in 6 mL of NS was verified with the nurse bedside and then then administered into the spinal fluid.  Following the procedure, drapes were taken down and Tomas was reprepped for bone marrow biopsy and aspirate.    Results:    PENDING    -----------------------------------------------------------------------------------------------------    Procedure:  Bone Marrow Aspiration and Biopsy    Analgesia/Sedation: Continuation of ketamine analgesia/sedation per PICU (Dr. Hernandez), subcutaneous and periosteal lidocaine injection for local pain control    Needle Type/Size:  11 gauge T-Kodak™  bone marrow biopsy needle x2  Site: Right superior posterior iliac crest    Complications:  None    Bleeding:  ~ 15 mL    Procedure Note:    Following completion of diagnostic lumbar puncture with intrathecal injection of chemotherapy, drapes were removed and Tomas's back was cleaned.  He maintained left lateral decubitus positioning. All bony landmarks were again palpated palpated including both iliac crests and vertebral bodies.  The subcutaneous tissue and periosteum of the right superior posterior iliac crest were infilrated with lidocaine.  An initial insertion was made with an 11 gauge T-Kodak™  bone marrow needle and resulted in an a scant 0.5 cm red, pulpy core of marrow.  A second pass was made with 11 gauge T-Kodak™  ~0.5 cm superior to  the first insertion.  Although there was definitive aspiration of bone marrow, flow was quite sluggish.  Aspirate and blood clot sample were obtained.  Additionally, 5 mL of heparinized bone marrow were obtained for flow cytometry and cytogenetics and were sent to [facility].  Additionally, as Tomas Roy had consented for [id number]/biobanking and [id number], research samples were obtained.  5 additional mL of bone marrow was obtained and placed in shipping media directly.  An additional 2 to 3 mL of bone marrow were obtained and placed in shipping media directly however not able to obtain any additional sample.  The patient tolerated the procedure without complications and ~15 mL of blood loss.      Physical Examination While Sedated:    Testicular exam: SMR 5 circumcised penis, testes descended bilaterally, symmetric and of expected volume for age.  No palpable masses.    Pepe Faye MD  Pediatric Hematology / Oncology  [facility]  Cell.  [phone]

## 2022-05-30 NOTE — PROGRESS NOTES
Justine from Lab called with critical result of WBC: 158 at 0134. Critical lab result read back to Justine.   Dr. Faye notified of critical lab result at 0135.  Critical lab result read back by Dr. Faye.
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
## 2022-05-30 NOTE — DISCHARGE PLANNING
:    Referral: Mother has questions about insurance and coverage of hospital bill    Intervention:  Spoke with Dr. Faye who stated mother is worried about their insurance and if it is in network and if it will cover this hospitalization.      REGIS spoke with Patient Financial Assistance who stated [facility] is in network and they will reach out to mother to answer her questions.  Received email from Lily Reyes-PFA who contacted mother and answered questions and offered to assist with applying for Medicaid.      Plan:  REGIS will refer Pt to [facility] Social Worker.

## 2022-05-30 NOTE — PROGRESS NOTES
Tech from Lab called with critical result of WBC of 71.3 at 1133. Critical lab result read back to tech. Dr. Faye notified of critical lab result at 1133.  Critical lab result read back by Dr. Faye.

## 2022-05-30 NOTE — PROGRESS NOTES
"Pharmacy Chemotherapy Verification Note:    Dx: HR B-ALL        Protocol and Dosing Reference: Induction per COG [id number] (enrolled on [id number])         Allergies:  Amoxicillin     /71   Pulse (!) 109   Temp 36.3 °C (97.3 °F)   Resp 19   Ht 1.651 m (5' 5\")   Wt 78.4 kg (172 lb 13.5 oz)   SpO2 99%   BMI 28.76 kg/m²  Body surface area is 1.9 meters squared.    Labs from 5/30/22 reviewed - all within treatment parameters. PICC line removed, but pt has central IJ line in place, ok to proceed per Dr Faye  5/30/22 ECHO LVEF 75%     Drug Order   (Drug name, dose, route, IV Fluid & volume, frequency, number of doses) Cycle: induction Day 1      Previous treatment: n/a     Medication = cytarabine  Base Dose = 70 mg fixed dose  Calc Dose: no calc req'd  Final Dose = 70 mg  Route = intraTHECAL  Fluid & Volume = NS 6 mL  Admin Duration = IT inj by MD only   To be given by MD with diagnostic LP       <10% difference, okay to treat with final dose      Medication = vincristine  Base Dose = 1.5 mg/m2 (MAX 2 mg)  Calc Dose: Base Dose x 1.9 m2 = 2.8 mg  Final Dose = 2 mg  Route = IV  Fluid & Volume = NS 25 mL  Admin Duration = Over 5-10 mins          Ok to treat with max dose   Medication = daunorubicin  Base Dose = 25 mg/m2  Calc Dose: Base Dose x 1.9 m2 = 47.5 mg  Final Dose = 47.5 mg  Route = IV  Fluid & Volume = NS 25 mL  Admin Duration = Over 1-15 mins          <10% difference, okay to treat with final dose     By my signature below, I confirm this process was performed independently with the BSA and all final chemotherapy dosing calculations congruent. I have reviewed the above chemotherapy order and that my calculation of the final dose and BSA (when applicable) corroborate those calculations of the  pharmacist.     Judy Bryant, PharmD, BCOP     "

## 2022-05-30 NOTE — PROGRESS NOTES
4 Eyes Skin Assessment Completed by MONTSERRAT Wilcox and MONTSERRAT Francisco.    Head WDL  Ears WDL  Nose WDL  Mouth WDL  Neck WDL  Breast/Chest WDL  Shoulder Blades WDL  Spine WDL  (R) Arm/Elbow/Hand WDL  (L) Arm/Elbow/Hand WDL  Abdomen WDL  Groin WDL  Scrotum/Coccyx/Buttocks WDL  (R) Leg Redness and Swelling  (L) Leg WDL  (R) Heel/Foot/Toe WDL  (L) Heel/Foot/Toe WDL          Devices In Places ECG, Blood Pressure Cuff, Pulse Ox and Nasal Cannula      Interventions In Place Gray Ear Foams, Pillows and Pressure Redistribution Mattress    Possible Skin Injury No    Pictures Uploaded Into Epic N/A  Wound Consult Placed N/A  RN Wound Prevention Protocol Ordered No

## 2022-05-30 NOTE — PROGRESS NOTES
Informed consent for PICC obtained, pt met PICC insertion criteria, vessel patency confirmed with ultrasound. Patient/family educated about procedure, questions answered, written informed consent obtained. Timeout completed prior to initiation of proceedure.   A 5 Fr. double Lumen solo Power PICC was placed into left Upper extremity on 1 Attempt(s) using ultrasound guidance via modified Seldinger technique under sterile conditions. Catheter length was 44 cm and demonstrated blood return in lumen(s) and flushed without resistance with a minimum of 5ml of 0.9% NS. Secured with a Statlock, Biopatch placed, Tegaderm applied, dressing dated for today. PICC placement confirmation via  follow-up chest x-ray. Affectiva Power PICC Ref# [id number] Lot# [id number]       CXR: abnormal path of PICC, unable to ensure proper placement, may be in an abberant SVC or worst coronary sinus, either way will remove PICC for now an attempt replacement in am

## 2022-05-30 NOTE — PROGRESS NOTES
ADELA from Lab called with critical result of .5 at 1713. Critical lab result read back to ADELA.   Dr. Faye notified of critical lab result at 1714.  Critical lab result read back by Dr. Faye.

## 2022-05-30 NOTE — CARE PLAN
The patient is Watcher - Medium risk of patient condition declining or worsening    Shift Goals  Clinical Goals: pain management, monitor labs, stable VS  Patient Goals: rest  Family Goals: understand POC, rest    Progress made toward(s) clinical / shift goals:        Problem: Pain - Standard  Goal: Alleviation of pain or a reduction in pain to the patient’s comfort goal  Outcome: Progressing  Note: Minimal use of PRN medications for pain, nonpharmacologic comfort measures in place     Problem: Discharge Barriers/Planning  Goal: Patient's continuum of care needs are met  Outcome: Progressing  Note: Developmentally appropriate interventions offered, continuous assessment of needs performed throughout shift     Problem: Psychosocial  Goal: Spiritual and cultural needs will be incorporated into hospitalization  Outcome: Progressing  Note: Discussed spiritual needs with family, offered available resources      Problem: Fluid Volume  Goal: Fluid volume balance will be maintained  Outcome: Progressing     Problem: Urinary Elimination  Goal: Establish and maintain regular urinary output  Outcome: Progressing       Patient is not progressing towards the following goals:      Problem: Respiratory  Goal: Patient will achieve/maintain optimum respiratory ventilation and gas exchange  Outcome: Not Progressing  Note: Remains on NC, unable to wean O2 off during this shift

## 2022-05-31 ENCOUNTER — APPOINTMENT (OUTPATIENT)
Dept: RADIOLOGY | Facility: MEDICAL CENTER | Age: 22
DRG: 834 | End: 2022-05-31
Attending: NURSE PRACTITIONER
Payer: COMMERCIAL

## 2022-05-31 LAB
ANION GAP SERPL CALC-SCNC: 11 MMOL/L (ref 7–16)
ANION GAP SERPL CALC-SCNC: 11 MMOL/L (ref 7–16)
ANISOCYTOSIS BLD QL SMEAR: ABNORMAL
BASOPHILS # BLD AUTO: 0 % (ref 0–1.8)
BASOPHILS # BLD: 0 K/UL (ref 0–0.12)
BLASTS NFR BLD MANUAL: 57.6 %
BLASTS NFR BLD MANUAL: 67.3 %
BLASTS NFR BLD MANUAL: 72.3 %
BUN SERPL-MCNC: 12 MG/DL (ref 8–22)
BUN SERPL-MCNC: 9 MG/DL (ref 8–22)
CALCIUM SERPL-MCNC: 8 MG/DL (ref 8.5–10.5)
CALCIUM SERPL-MCNC: 8.1 MG/DL (ref 8.5–10.5)
CHLORIDE SERPL-SCNC: 101 MMOL/L (ref 96–112)
CHLORIDE SERPL-SCNC: 102 MMOL/L (ref 96–112)
CO2 SERPL-SCNC: 23 MMOL/L (ref 20–33)
CO2 SERPL-SCNC: 25 MMOL/L (ref 20–33)
CREAT SERPL-MCNC: 0.65 MG/DL (ref 0.5–1.4)
CREAT SERPL-MCNC: 0.66 MG/DL (ref 0.5–1.4)
EOSINOPHIL # BLD AUTO: 0 K/UL (ref 0–0.51)
EOSINOPHIL NFR BLD: 0 % (ref 0–6.9)
ERYTHROCYTE [DISTWIDTH] IN BLOOD BY AUTOMATED COUNT: 50 FL (ref 35.9–50)
ERYTHROCYTE [DISTWIDTH] IN BLOOD BY AUTOMATED COUNT: 52.1 FL (ref 35.9–50)
ERYTHROCYTE [DISTWIDTH] IN BLOOD BY AUTOMATED COUNT: 53.2 FL (ref 35.9–50)
FIBRINOGEN PPP-MCNC: 452 MG/DL (ref 215–460)
FIBRINOGEN PPP-MCNC: 480 MG/DL (ref 215–460)
GFR SERPLBLD CREATININE-BSD FMLA CKD-EPI: 137 ML/MIN/1.73 M 2
GFR SERPLBLD CREATININE-BSD FMLA CKD-EPI: 138 ML/MIN/1.73 M 2
GLUCOSE SERPL-MCNC: 105 MG/DL (ref 65–99)
GLUCOSE SERPL-MCNC: 123 MG/DL (ref 65–99)
HCT VFR BLD AUTO: 25.3 % (ref 42–52)
HCT VFR BLD AUTO: 26 % (ref 42–52)
HCT VFR BLD AUTO: 26.5 % (ref 42–52)
HGB BLD-MCNC: 8.5 G/DL (ref 14–18)
HGB BLD-MCNC: 8.6 G/DL (ref 14–18)
HGB BLD-MCNC: 8.7 G/DL (ref 14–18)
LYMPHOCYTES # BLD AUTO: 15.45 K/UL (ref 1–4.8)
LYMPHOCYTES # BLD AUTO: 20.73 K/UL (ref 1–4.8)
LYMPHOCYTES # BLD AUTO: 35.48 K/UL (ref 1–4.8)
LYMPHOCYTES NFR BLD: 23.5 % (ref 22–41)
LYMPHOCYTES NFR BLD: 26.5 % (ref 22–41)
LYMPHOCYTES NFR BLD: 41.5 % (ref 22–41)
MANUAL DIFF BLD: ABNORMAL
MCH RBC QN AUTO: 29.4 PG (ref 27–33)
MCH RBC QN AUTO: 29.6 PG (ref 27–33)
MCH RBC QN AUTO: 29.7 PG (ref 27–33)
MCHC RBC AUTO-ENTMCNC: 32.8 G/DL (ref 33.7–35.3)
MCHC RBC AUTO-ENTMCNC: 33.1 G/DL (ref 33.7–35.3)
MCHC RBC AUTO-ENTMCNC: 33.6 G/DL (ref 33.7–35.3)
MCV RBC AUTO: 88.2 FL (ref 81.4–97.8)
MCV RBC AUTO: 88.7 FL (ref 81.4–97.8)
MCV RBC AUTO: 90.4 FL (ref 81.4–97.8)
MICROCYTES BLD QL SMEAR: ABNORMAL
MONOCYTES # BLD AUTO: 0 K/UL (ref 0–0.85)
MONOCYTES # BLD AUTO: 0.52 K/UL (ref 0–0.85)
MONOCYTES # BLD AUTO: 0.79 K/UL (ref 0–0.85)
MONOCYTES NFR BLD AUTO: 0 % (ref 0–13.4)
MONOCYTES NFR BLD AUTO: 0.9 % (ref 0–13.4)
MONOCYTES NFR BLD AUTO: 0.9 % (ref 0–13.4)
MORPHOLOGY BLD-IMP: NORMAL
MYELOCYTES NFR BLD MANUAL: 0.8 %
NEUTROPHILS # BLD AUTO: 0.77 K/UL (ref 1.82–7.42)
NEUTROPHILS # BLD AUTO: 2.21 K/UL (ref 1.82–7.42)
NEUTROPHILS # BLD AUTO: 3.09 K/UL (ref 1.82–7.42)
NEUTROPHILS NFR BLD: 0.9 % (ref 44–72)
NEUTROPHILS NFR BLD: 2.5 % (ref 44–72)
NEUTROPHILS NFR BLD: 4.4 % (ref 44–72)
NEUTS BAND NFR BLD MANUAL: 0.9 % (ref 0–10)
NRBC # BLD AUTO: 0 K/UL
NRBC # BLD AUTO: 0.03 K/UL
NRBC # BLD AUTO: 0.04 K/UL
NRBC BLD-RTO: 0 /100 WBC
PATHOLOGY CONSULT NOTE: NORMAL
PHOSPHATE SERPL-MCNC: 3.5 MG/DL (ref 2.5–4.5)
PHOSPHATE SERPL-MCNC: 3.6 MG/DL (ref 2.5–4.5)
PLATELET # BLD AUTO: 58 K/UL (ref 164–446)
PLATELET # BLD AUTO: 76 K/UL (ref 164–446)
PLATELET # BLD AUTO: 89 K/UL (ref 164–446)
PLATELET BLD QL SMEAR: NORMAL
PMV BLD AUTO: 10.2 FL (ref 9–12.9)
PMV BLD AUTO: 9.6 FL (ref 9–12.9)
PMV BLD AUTO: 9.6 FL (ref 9–12.9)
POTASSIUM SERPL-SCNC: 4.1 MMOL/L (ref 3.6–5.5)
POTASSIUM SERPL-SCNC: 4.1 MMOL/L (ref 3.6–5.5)
RBC # BLD AUTO: 2.87 M/UL (ref 4.7–6.1)
RBC # BLD AUTO: 2.93 M/UL (ref 4.7–6.1)
RBC # BLD AUTO: 2.93 M/UL (ref 4.7–6.1)
RBC BLD AUTO: PRESENT
SODIUM SERPL-SCNC: 136 MMOL/L (ref 135–145)
SODIUM SERPL-SCNC: 137 MMOL/L (ref 135–145)
URATE SERPL-MCNC: 2.8 MG/DL (ref 2.5–8.3)
URATE SERPL-MCNC: 3.2 MG/DL (ref 2.5–8.3)
VZV IGG SER IA-ACNC: 2.03
WBC # BLD AUTO: 58.3 K/UL (ref 4.8–10.8)
WBC # BLD AUTO: 85.5 K/UL (ref 4.8–10.8)
WBC # BLD AUTO: 88.2 K/UL (ref 4.8–10.8)

## 2022-05-31 PROCEDURE — 86696 HERPES SIMPLEX TYPE 2 TEST: CPT

## 2022-05-31 PROCEDURE — A9270 NON-COVERED ITEM OR SERVICE: HCPCS | Performed by: INTERNAL MEDICINE

## 2022-05-31 PROCEDURE — 84100 ASSAY OF PHOSPHORUS: CPT

## 2022-05-31 PROCEDURE — 700111 HCHG RX REV CODE 636 W/ 250 OVERRIDE (IP): Performed by: STUDENT IN AN ORGANIZED HEALTH CARE EDUCATION/TRAINING PROGRAM

## 2022-05-31 PROCEDURE — 84550 ASSAY OF BLOOD/URIC ACID: CPT | Mod: 91

## 2022-05-31 PROCEDURE — 700102 HCHG RX REV CODE 250 W/ 637 OVERRIDE(OP): Performed by: PEDIATRICS

## 2022-05-31 PROCEDURE — 86644 CMV ANTIBODY: CPT

## 2022-05-31 PROCEDURE — A9270 NON-COVERED ITEM OR SERVICE: HCPCS | Performed by: PEDIATRICS

## 2022-05-31 PROCEDURE — 700105 HCHG RX REV CODE 258: Performed by: PEDIATRICS

## 2022-05-31 PROCEDURE — 80048 BASIC METABOLIC PNL TOTAL CA: CPT | Mod: 91

## 2022-05-31 PROCEDURE — 700105 HCHG RX REV CODE 258: Performed by: NURSE PRACTITIONER

## 2022-05-31 PROCEDURE — 86695 HERPES SIMPLEX TYPE 1 TEST: CPT

## 2022-05-31 PROCEDURE — 700111 HCHG RX REV CODE 636 W/ 250 OVERRIDE (IP): Performed by: NURSE PRACTITIONER

## 2022-05-31 PROCEDURE — 700102 HCHG RX REV CODE 250 W/ 637 OVERRIDE(OP): Performed by: INTERNAL MEDICINE

## 2022-05-31 PROCEDURE — 86645 CMV ANTIBODY IGM: CPT

## 2022-05-31 PROCEDURE — 85384 FIBRINOGEN ACTIVITY: CPT | Mod: 91

## 2022-05-31 PROCEDURE — 85025 COMPLETE CBC W/AUTO DIFF WBC: CPT | Mod: 91

## 2022-05-31 PROCEDURE — 700111 HCHG RX REV CODE 636 W/ 250 OVERRIDE (IP): Performed by: INTERNAL MEDICINE

## 2022-05-31 PROCEDURE — 36573 INSJ PICC RS&I 5 YR+: CPT

## 2022-05-31 PROCEDURE — 700111 HCHG RX REV CODE 636 W/ 250 OVERRIDE (IP): Performed by: PEDIATRICS

## 2022-05-31 PROCEDURE — 02HV33Z INSERTION OF INFUSION DEVICE INTO SUPERIOR VENA CAVA, PERCUTANEOUS APPROACH: ICD-10-PCS | Performed by: PEDIATRICS

## 2022-05-31 PROCEDURE — 86694 HERPES SIMPLEX NES ANTBDY: CPT

## 2022-05-31 PROCEDURE — 770001 HCHG ROOM/CARE - MED/SURG/GYN PRIV*

## 2022-05-31 PROCEDURE — 85007 BL SMEAR W/DIFF WBC COUNT: CPT | Mod: 91

## 2022-05-31 PROCEDURE — 86787 VARICELLA-ZOSTER ANTIBODY: CPT

## 2022-05-31 RX ORDER — LORAZEPAM 2 MG/ML
2 INJECTION INTRAMUSCULAR
Status: COMPLETED | OUTPATIENT
Start: 2022-05-31 | End: 2022-05-31

## 2022-05-31 RX ORDER — MIDAZOLAM HYDROCHLORIDE 1 MG/ML
2 INJECTION INTRAMUSCULAR; INTRAVENOUS
Status: COMPLETED | OUTPATIENT
Start: 2022-05-31 | End: 2022-05-31

## 2022-05-31 RX ORDER — ALLOPURINOL 100 MG/1
200 TABLET ORAL EVERY 8 HOURS
Status: DISCONTINUED | OUTPATIENT
Start: 2022-05-31 | End: 2022-06-04 | Stop reason: HOSPADM

## 2022-05-31 RX ADMIN — PREDNISONE 50 MG: 10 TABLET ORAL at 06:41

## 2022-05-31 RX ADMIN — FEBUXOSTAT 120 MG: 40 TABLET, FILM COATED ORAL at 06:41

## 2022-05-31 RX ADMIN — OXYCODONE 2.5 MG: 5 TABLET ORAL at 00:23

## 2022-05-31 RX ADMIN — APIXABAN 10 MG: 5 TABLET, FILM COATED ORAL at 19:04

## 2022-05-31 RX ADMIN — LORAZEPAM 2 MG: 2 INJECTION INTRAMUSCULAR; INTRAVENOUS at 15:55

## 2022-05-31 RX ADMIN — HEPARIN SODIUM 26 UNITS/KG/HR: 5000 INJECTION, SOLUTION INTRAVENOUS at 06:30

## 2022-05-31 RX ADMIN — CEFEPIME 2 G: 2 INJECTION, POWDER, FOR SOLUTION INTRAVENOUS at 06:42

## 2022-05-31 RX ADMIN — MIDAZOLAM HYDROCHLORIDE 2 MG: 1 INJECTION, SOLUTION INTRAMUSCULAR; INTRAVENOUS at 15:34

## 2022-05-31 RX ADMIN — HEPARIN 200 UNITS: 100 SYRINGE at 22:16

## 2022-05-31 RX ADMIN — SENNOSIDES AND DOCUSATE SODIUM 2 TABLET: 50; 8.6 TABLET ORAL at 06:40

## 2022-05-31 RX ADMIN — ONDANSETRON 8 MG: 2 INJECTION INTRAMUSCULAR; INTRAVENOUS at 01:59

## 2022-05-31 RX ADMIN — SENNOSIDES AND DOCUSATE SODIUM 2 TABLET: 50; 8.6 TABLET ORAL at 18:57

## 2022-05-31 RX ADMIN — FAMOTIDINE 20 MG: 20 TABLET, FILM COATED ORAL at 18:00

## 2022-05-31 RX ADMIN — FAMOTIDINE 20 MG: 20 TABLET, FILM COATED ORAL at 06:40

## 2022-05-31 RX ADMIN — CEFEPIME 2 G: 2 INJECTION, POWDER, FOR SOLUTION INTRAVENOUS at 14:10

## 2022-05-31 RX ADMIN — LORAZEPAM 2 MG: 2 INJECTION INTRAMUSCULAR; INTRAVENOUS at 15:29

## 2022-05-31 RX ADMIN — CEFEPIME 2 G: 2 INJECTION, POWDER, FOR SOLUTION INTRAVENOUS at 22:17

## 2022-05-31 RX ADMIN — SODIUM CHLORIDE: 9 INJECTION, SOLUTION INTRAVENOUS at 08:42

## 2022-05-31 RX ADMIN — ONDANSETRON 8 MG: 2 INJECTION INTRAMUSCULAR; INTRAVENOUS at 22:16

## 2022-05-31 RX ADMIN — PREDNISONE 60 MG: 10 TABLET ORAL at 19:03

## 2022-05-31 RX ADMIN — ALLOPURINOL 200 MG: 100 TABLET ORAL at 22:17

## 2022-05-31 RX ADMIN — ONDANSETRON 8 MG: 2 INJECTION INTRAMUSCULAR; INTRAVENOUS at 14:10

## 2022-05-31 ASSESSMENT — PAIN DESCRIPTION - PAIN TYPE
TYPE: ACUTE PAIN

## 2022-05-31 ASSESSMENT — FIBROSIS 4 INDEX: FIB4 SCORE: 1.96

## 2022-05-31 NOTE — DISCHARGE PLANNING
Spoke to Lisa at [facility].  Lisa requesting clinical update and anticipated discharge from inpatient setting.  RN CM to follow with attending in morning and update Lisa at phone number [phone].

## 2022-05-31 NOTE — PROGRESS NOTES
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  Afebrile with T-max 100 °F.  Transferred care to Pediatric Hematology/Oncology following leukapheresis.  Currently in the Pediatric Intensive Care Unit but on the Pediatric Oncology service.  Overnight had some discomfort primarily associated with his pheresis catheter and EKG leads.  No other complaints of any discomfort or pain.   Tomas Roy denies any headaches, changes in vision or neurologic status changes.  No additional complaints of any pain in his lower extremity.  No other swelling or discomfort in any other extremities. Tomas Roy did have small desaturations to 92-93% and he was placed on nasal cannula supplemental oxygen and saturating near 100% the remainder of the night.  He continues this morning with 1 L nasal cannula for comfort.  No subsequent desaturations.  No complaints of any shortness of breath or difficulty breathing.  No chest pain. Tomas Roy continued on his heparin drip until 6 AM this morning in anticipation of a procedure at noon.  This morning he also received platelets for a platelet count of 28.  He tolerated the transfusion well without any fevers or side effects.  He is not complaining of any nausea or vomiting.  No abdominal discomfort or pain.  No abdominal distention. Tomas Roy does continue to have difficulty swallowing which he believes is due to his pheresis catheter.  No complaints of any rashes or skin changes.  No other concerns or complaints at this time.    Review of Systems:     Constitutional: Afebrile. Tired  "and not feeling well overall.  Energy decreased.  Appetite decreased.  HENT: Negative for auditory changes or ear pain, no nosebleeds, no mouth sores.  Difficulty with swallowing due to pheresis catheter however describes neck pain, not throat pain..  Eyes: Negative for visual changes.  Respiratory: Negative for shortness of breath, difficulty breathing or chest pain.  Cardiovascular: Negative.  Gastrointestinal: Negative for nausea, vomiting, abdominal pain, diarrhea, constipation.  Genitourinary: Negative.  Musculoskeletal: Negative.  Skin: Negative for rash or skin infection.  Neurological: Negative for numbness, tingling, sensory changes, weakness or headaches.    Endo/Heme/Allergies: No bruising/bleeding easily.    Psychiatric/Behavioral: Stable mood.     OBJECTIVE:     Max Temp: Temp (24hrs), Av.8 °C (98.2 °F), Min:35.9 °C (96.7 °F), Max:37.8 °C (100 °F)    Vitals: /64   Pulse 97   Temp 35.9 °C (96.7 °F)   Resp 13   Ht 1.651 m (5' 5\")   Wt 78.4 kg (172 lb 13.5 oz)   SpO2 99%   BMI 28.76 kg/m²     I/O:   Intake/Output Summary (Last 24 hours) at 2022 1236  Last data filed at 2022 0900  Gross per 24 hour   Intake 2309.7 ml   Output 1300 ml   Net 1009.7 ml       Labs:    Most Recent Metabolic Panel:  Lab Results   Component Value Date/Time    POTASSIUM 4.6 2022 0735    CHLORIDE 102 2022 0735    CO2 20 2022 0735    GLUCOSE 95 2022 0735    BUN 8 2022 0735    CREATININE 0.77 2022 0735    CALCIUM 7.8 (L) 2022 0735    ANION 12.0 2022 0735       Most Recent Tumor Lysis Labs:  Lab Results   Component Value Date/Time    URICACID 3.0 2022 0735    PHOSPHORUS 2.5 2022 0735    IONIZEDCALC 1.1 2022 0830       Most Recent Hematology Labs:  Lab Results   Component Value Date/Time    WBC 71.3 (HH) 2022 1055    HEMOGLOBIN 7.1 (L) 2022 1055    MCV 92.1 2022 1055    PLATELETCT 28 (L) 2022 1055    NEUTS 6.08 2022 " 0735     Physical Exam:    Constitutional: Overall stable in appearance.  Uncomfortable and sick but not toxic appearing.  HENT: Normocephalic and atraumati.  No rhinorrhea. Oropharynx is clear and moist.   Eyes: Conjunctivae are normal. EOMI. nonicteric.  Neck: 1 cm left level 2 cervical node, tender.  No other adenopathy.  Pheresis catheter (IJ) C/D/I.  Cardiovascular: Normal rate, regular rhythm.  No murmur. DP/radial pulses 2+, cap refill < 2 sec.  Pulmonary/Chest: Effort normal. No respiratory distress. Symmetric expansion.  No crackles or wheezes.  Abdomen: Soft. Bowel sounds are normal. No distension and no mass. There is no hepatosplenomegaly.    Genitourinary:  Deferred (see procedure note for Testicular Exam)  Musculoskeletal: Normal range of motion of lower and upper extremities bilaterally.   Lymphadenopathy: No cervical adenopathy, axillary adenopathy or inguinal adenopathy.   Neurological: Alert and oriented to person and place. Exhibits normal muscle tone bilaterally in upper and lower extremities.  Gait not assessed.  Skin: Skin is warm, dry and pink.  No rash or evidence of skin infection.  Pheresis catheter as above.  PIV and bilateral antecubital fossa without erythema or signs of infection.  Psychiatric: Mood stable and anxious..    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     1) Precursor B-Cell Acute Lymphoblastic Leukemia:              - 2-3 weeks of symptoms              - Presenting WBC > 440 k/uL, hyperleukocytosis              - Start of Hydroxyurea (1500 mg PO Q8) 2320 on 5/27/2022  - discontinued this AM (55 hours of hydroxyurea < 72h allowed)              - Leukapharesis for hyperleukocytosis and risk for leukostasis - pharesed this AM - post pharesis WBC count 71.3 k/uL              - No steroid pretreatment              - Peripheral flow cytometry remarkable for  very large population of CD10 pos, CD19 pos, CD 20 negative lymphoblasts (communicated from Dr. Darden - Hematopathology 5/29/2022)               - CNS status PENDING (1 WBC, 3 RBC - awaiting pathology review)   - Testicular status NEGATIVE                     - Meets with all criteria for [id number]              - [id number] offered to patient.  Risks and benefits of study discussed in detail (see separate consent notes).  All questions answered.  Consent for [id number] signed.  Patient enrolled ON STUDY [id number].  Biobanking samples to be obtained at time of bone marrow biopsy and aspirate today                 - Given the following 1) Enrollment on [id number] 2) Patient meeting with all criteria for enrollment on [facility] [id number] 3) and Patient not meeting with any exclusion criteria for enrollment on [facility] [id number],  treatment ON STUDY [facility] [id number] offered to patient.  The risks, benefits, alternatives and risks and benefits of the alternatives were discussed with the patient and his mother.  Patient HR by age alone.  Discussed that  HR-Fav B-ALL arm of  [id number] would not be an option for Tomas, but that inclusion on study would allow for randomization between the control arm  (Arm A) and the experimental arm with inotuzumab ozogamicin azithromycin (Arm B) for patients with MRD negative disease at the End of Consolidation.  Discussed with patient and his mother the natural history of B-ALL,, prognosis for high risk patients as well as an overall review of study therapy as well as a detailed review of Induction and Consolidation to include review of all chemotherapeutic medications and there side effects.  The patient and his mother were given an opportunity to ask questions and all questions were answered before Tomas signed consent for enrollment ON STUDY [id number].   Tomas was made aware that he would be removed from study if he were to demonstrate features of Ph+ or Ph-like disease or if he were to fail to achieve  remission by the EOC.  He understands that he does have CD22 positive disease (60% dim expression) and that this would not exclude him from participation in the study.  He was also informed that he could discontinue participation at any time.  The patient agreed to abstinence or effective contraception during study participation.                - Bone marrow biopsy/aspirate, LP with intrathecal cytarabine and PICC line placement with sedation today              - Study samples collected at the time of BMAB and LP                            - Pretreatment work-up as below:    CBC: WBC 72.3, Hgb 6.5, platelets 53K (ANC 3250, Abs Peripheral Blast Count 52,923) (just prior to BM/study labs 5/30/2022)    CMP:  AST 41, ALT 22, total bili 0.3, direct bili <0.2                          CXR - unremarkable                          ECHO - EF 75% (will have ECHO reviewed again for measurement of SF)                          Ferritin PENDING                          D-Dimer elevated at 1.05 5/30/2022                          Fibrinogen 324 as of 5/29/2022                          PT/aPTT 16/36.5 as of 5/28/2022                          CMV, Varicella, HSV serologies PENDING    Performance Status:  ECOG 2, Karnofsky 50                     High Risk Acute B-Lymphoblastic Leukemia, [id number](OS), Induction, Day 1               ** Cytarabine 70 mg IT x1 dose on Day 1 (see separate procedure note)    ** Vincristine 2 mg IV on Days 1, 8, 15 and 22    ** Daunorubicin 47.5 mg IV on Days 1, 8, 15 and 22    ** PEG Aspargase 2500 IU/m2/dose on Day 4    ** Prednisone 60 mg PO BID on Days 1-28    ** Methotrexate IT on Days 8 and 29     2) Hyperleukocytosis (RESOLVING) see:              - Presenting WBC > 440 k/uL              - Right lower extremity thrombosis and subsegmental PE              - With the exception of blood clots, no other evidence of leukostasis              - Hydroxyurea for cytoreduction used less than 72 hours, discontinued  today              - Leukapheresis discontinued today              - TLS as below     3) Disease Related Pancytopenia:              - Worsening anemia with hemoglobin 6.5 g/dL              - Thrombocytopenia with platelet count 53 K              - ANC 3250 but functionally neutropenic              - Transfuse for Hgb < 7 or symptomatic              - Transfuse for platelets < 10K or symptomatic (cautious transfusion while still with hyperleukocytosis)              - Neutropenic Precautions      -Transfused 2 units of platelets earlier this morning before procedures.  Will transfuse 1 unit of PRBCs this afternoon.     4) Fever and Neutropenia:              - Febrile to 38.5C on 5/28/2022 at 0945 -no fever since              - Blood cultures drawn x2 - NGTD              - Started and continued on empiric Cefepime              - ANC 3250 but functionally neutropenic              - Monitor clinical status, can broaden coverage if medically indicated     5) 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:              - On presentation Uric Acid 15.6, LDH 1114              - High tumor burden              - S/P Rasburicase x 1 dose              - Continue febuxostat 120 mg PO daily               - Hyperhydration of NS at 125 mL/h -some evidence of fluid overload, monitor fluid status closely              - TLS labs Q8 hours (potassium, uric acid, phosphorus, ionized calcium)   - Potassium 5.0, ionized calcium 1.1, uric acid 3.0, phosphorus 2.5              - Will space out labs when tumor burden has been reduced     6) Hypercoagulability / Superficial Right LE Thrombus / Subsegmental PE:              - Anticoagulation held for lumbar puncture this morning   - Restart heparin drip following procedure targeting therapeutic anti-Xa              - Platelets transfused allow for hemostasis during procedure, 53K prior to procedure              - Will consider transition to apixaban tomorrow for anticoagulation through induction     7) Acute  Kidney Injury:              - Creatinine stable, 0.86 this morning              - Continue with hyperhydration as well as febuxostat     8) Sinus Tachycardia (IMPROVED):     9) Social:              - Social Work Consultation              - Referral to [facility]              - Continue support     Access:               - Right IJ pheresis catheter              - Bilateral antecubital PIV              - Placement of PICC line today              - Will remove IJ when stable WBC < 100 k/uL     Disposition:  Inpatient for treatment of Precursor B-Cell Acute Lymphoblastic Leukemia    Pepe Faye MD  Pediatric Hematology / Oncology  [facility]  Cell.  [phone]  Office. [phone]

## 2022-05-31 NOTE — CONSULTS
"Pediatric Critical Care CONSULT  Lia Hernandez , PICU Attending  Date: 5/31/2022     Time: 10:55 AM      Consult request:  Dr. 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 male who was admitted on 5/27/2022 for a new diagnosis of B-cell ALL. He has been otherwise a healthy male who presented with  2 weeks of progressive fatigue and back/hip pain as well as low grade fevers.  He also endorses some intermittent headaches.  At the time, he attributed this to the stress of his final exams.       His symptoms continued to worsen and he developed RLE pain, discoloration, and swelling.  He had an episode of near syncope while standing which prompted him to seek medical evaluation.  He was initially seen at [facility] ED.   His workup revealed a superficial thrombosis of his RLE and subsegmental pulmonary embolism.  A CBC also demonstrated hyperleukocytosis with a WBC count of 440K.  He was subsequently transferred to [facility] for urgent leukapheresis.  He was also found to have an elevated uric acid (15.6) and LDH (1114), as well as a mild coagulopathy.        He was taken to  for placement of an emergent HD cath for dialysis.  During the procedure he had tachycardia and arrhythmia, so was placed on the adult telemetry/hospitalist service, but this resolved.  JULY was started on hyperhydration at that time, hydroxyurea, and rasburicase.  Tumor lysis labs have been closely monitored.  Shortly after admission he developed neutropenic fever and was started on Cefepime.  He was also started on a heparin drip given his PE and hypercoagulable state.  His peripheral flow cytometry demonstrated CD10+, CD19+, CD20-, and CD22 dim(60% of cells) which is consistent with a diagnosis of Precursor B-raisa lymphoblastic leukemia.  Given " this diagnosis, he was transferred to the Pediatric Hematology/Oncology service for management and treatment.          JULY was admitted to the PICU under the Peds H/O service for increased nursing care/support.  PICU team asked to review the case should his condition deteriorate and require escalation to PICU management.        He underwent a bedside LP and bone marrow biopsy with conscious sedation on 5/30/22 with Ketamine and a PICC line was placed subsequently as well.  Post-PICC CXR revealed a left sided SVC and the PICC line had to be removed due to position in the IVC as recommended by radiology.      (Please refer to Peds H/O full H&P written by Dr. Faye on 5/29/22 for further details of current illness).            Review of Systems: I have reviewed at least 10 organ systems and found them to be negative, except per above.      PAST MEDICAL HISTORY:     Past Medical History:   Birth history:  1st of three children, normal pregnancy and delivery    Patient Active Problem List    Diagnosis Date Noted   • Pre B-cell acute lymphoblastic leukemia (ALL) ([facility]) 05/29/2022   • Neutropenic fever ([facility]) 05/28/2022   • Tachycardia with heart rate 100-120 beats per minute 05/28/2022   • Acute lymphoblastic leukemia in adult ([facility]) 05/27/2022   • PE (pulmonary thromboembolism) ([facility]) 05/27/2022   • Right leg pain 05/27/2022   • Back pain 05/27/2022   • Superficial vein thrombosis 05/27/2022   • Elevated blood uric acid level 05/27/2022   • Family history of diabetes mellitus 08/18/2020   • Callus of foot 08/18/2020   • Flat feet 04/03/2020   • Bilateral anterior knee pain 04/03/2020   • Bilateral ankle joint pain 04/03/2020   • Chronic midline thoracic back pain 04/03/2020       Past Surgical History:   Temporary Right IJ Pharesis Catheter Placement 5/28/2022    Past Family History:   Family History   Problem Relation Age of Onset   • No Known Problems Mother    • Diabetes Paternal Grandfather    • Hypertension Paternal  Grandfather    • Hyperlipidemia Paternal Grandfather    • Cancer Neg Hx    • Heart Disease Neg Hx    • Stroke Neg Hx        Developmental/Social History:        Social History   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   Lives at home with his mom, brother, and sister.  Two dogs.    Father is not involved.     Socioeconomic History   • Marital status: Single     Spouse name: Not on file   • Number of children: Not on file   • Years of education: Not on file   • Highest education level: Not on file   Occupational History   • Not on file   Tobacco Use   • Smoking status: Never Smoker   • Smokeless tobacco: Never Used   Vaping Use   • Vaping Use: Never used   Substance and Sexual Activity   • Alcohol use: Never   • Drug use: Never   • Sexual activity: Not Currently       Pediatric History   Patient Parents   • Zeinab Lees (Mother)       Primary Care Physician:   Margarito Arvizu M.D.    Allergies:   Amoxicillin    Home Medications:        Medication List      ASK your doctor about these medications      Instructions   ammonium lactate 12 % Lotn  Commonly known as: LAC-HYDRIN   Apply to peeling areas of feet twice daily as needed     ascorbic acid 500 MG Tabs  Commonly known as: ascorbic acid   Take 500 mg by mouth every day.  Dose: 500 mg     therapeutic multivitamin-minerals Tabs   Take 1 Tab by mouth every day.  Dose: 1 Tablet            No current facility-administered medications on file prior to encounter.     Current Outpatient Medications on File Prior to Encounter   Medication Sig Dispense Refill   • ammonium lactate (LAC-HYDRIN) 12 % Lotion Apply to peeling areas of feet twice daily as needed 500 g 0   • ascorbic acid (ASCORBIC ACID) 500 MG Tab Take 500 mg by mouth every day.     • therapeutic multivitamin-minerals (THERAGRAN-M) Tab Take 1 Tab by mouth every day.       Current Facility-Administered Medications   Medication Dose Route Frequency Provider Last Rate Last Admin   • LORazepam  (ATIVAN) injection 2 mg  2 mg Intravenous Once PRN Lia Hernandez D.O.       • lidocaine-prilocaine (EMLA) 2.5-2.5 % cream 1 Application  1 Application Topical PRN Lia Hernandez D.O.   1 Application at 05/30/22 1324   • [START ON 6/4/2022] sulfamethoxazole-trimethoprim (BACTRIM) 400-80 MG per tablet 2 Tablet  2 Tablet Oral 2 times per day on Sun Sat Pepe Faye M.D.       • famotidine (PEPCID) tablet 20 mg  20 mg Oral BID Pepe Faye M.D.   20 mg at 05/31/22 0640   • ondansetron (ZOFRAN) syringe/vial injection 8 mg  8 mg Intravenous Q8HRS Pepe Faye M.D.   8 mg at 05/31/22 0159   • predniSONE (DELTASONE) tablet 60 mg  60 mg Oral BID Pepe Faye M.D.   50 mg at 05/31/22 0641   • LORazepam (ATIVAN) injection 2 mg  2 mg Intravenous Q6HRS PRN Pepe Faye M.D.       • promethazine (PHENERGAN) tablet 25 mg  25 mg Oral Q6HRS PRN Pepe Faye M.D.       • ondansetron (ZOFRAN) syringe/vial injection 8 mg  8 mg Intravenous Q8HRS PRN Pepe Faye M.D.       • heparin lock flush 100 unit/mL injection 200 Units  200 Units Intravenous Q6HR MANDA LanP.NReid       • NS infusion   Intravenous Continuous Pepe Faye M.D. 125 mL/hr at 05/31/22 0842 New Bag at 05/31/22 0842   • acetaminophen (Tylenol) tablet 650 mg  650 mg Oral Q4HRS PRN Destini Jackson A.P.R.N.   650 mg at 05/28/22 1825   • cefepime (Maxipime) 2 g in dextrose 5% 20 mL IV syringe  2 g Intravenous Q8HRS AMBROSIO Mcclure.P.R.N.   Stopped at 05/31/22 0647   • sodium chloride (OCEAN) 0.65 % nasal spray 2 Spray  2 Spray Nasal Q2HRS PRN Brynja Jackson, A.P.R.N.       • senna-docusate (PERICOLACE or SENOKOT S) 8.6-50 MG per tablet 2 Tablet  2 Tablet Oral BID Margarito Hayward M.D.   2 Tablet at 05/31/22 0640    And   • polyethylene glycol/lytes (MIRALAX) PACKET 1 Packet  1 Packet Oral QDAY PRN Margarito Hayward M.D.        And   • magnesium hydroxide (MILK OF MAGNESIA) suspension 30 mL  30 mL Oral QDAY PRN Margarito Hayward M.D.        And   •  "bisacodyl (DULCOLAX) suppository 10 mg  10 mg Rectal QDAY PRN Margarito Hayward M.D.       • oxyCODONE immediate-release (ROXICODONE) tablet 2.5 mg  2.5 mg Oral Q3HRS PRN Margarito Hayward M.D.   2.5 mg at 05/31/22 0023    Or   • oxyCODONE immediate-release (ROXICODONE) tablet 5 mg  5 mg Oral Q3HRS PRN Margarito Hayward M.D.   5 mg at 05/29/22 0924    Or   • HYDROmorphone (Dilaudid) injection 0.25 mg  0.25 mg Intravenous Q3HRS PRN Margarito Hayward M.D.       • hydrALAZINE (APRESOLINE) injection 10 mg  10 mg Intravenous Q4HRS PRN Margarito Hayward M.D.       • febuxostat (ULORIC) tablet 120 mg  120 mg Oral DAILY Margarito Hayward M.D.   120 mg at 05/31/22 0641       Immunizations: Reported UTD      OBJECTIVE:     Vitals:   BP (!) 140/64   Pulse 86   Temp 36.7 °C (98 °F) (Temporal)   Resp 17   Ht 1.651 m (5' 5\")   Wt 77 kg (169 lb 12.1 oz)   SpO2 98%     PHYSICAL EXAM:   Gen:  Alert, nontoxic, well nourished, well hydrated, watching cartoons in bed, appears comfortable  HEENT: NC/AT, PERRL, conjunctiva clear, nares clear, MMM, neck exam deferred due to HD cath and some patient discomfort  Cardio: RRR, nl S1 S2, no murmur, pulses full and equal  Resp:  CTAB, no wheeze or rales, symmetric breath sounds  GI:  Soft, ND/NT, NABS, no masses, no guarding/rebound  Neuro: Non-focal, grossly intact, no deficits  Skin/Extremities: Cap refill <3sec, WWP, no rash, SUAREZ well but tender to touch especially RLE    CURRENT MEDCATIONS:    Current Facility-Administered Medications   Medication Dose Route Frequency Provider Last Rate Last Admin   • LORazepam (ATIVAN) injection 2 mg  2 mg Intravenous Once PRN Lia Hernandez D.O.       • lidocaine-prilocaine (EMLA) 2.5-2.5 % cream 1 Application  1 Application Topical PRN Lia Hernandez D.O.   1 Application at 05/30/22 1324   • [START ON 6/4/2022] sulfamethoxazole-trimethoprim (BACTRIM) 400-80 MG per tablet 2 Tablet  2 Tablet Oral 2 times per day on Sun Sat Pepe Faye M.D.       • famotidine " (PEPCID) tablet 20 mg  20 mg Oral BID Pepe Faye M.D.   20 mg at 05/31/22 0640   • ondansetron (ZOFRAN) syringe/vial injection 8 mg  8 mg Intravenous Q8HRS Pepe Faye M.D.   8 mg at 05/31/22 0159   • predniSONE (DELTASONE) tablet 60 mg  60 mg Oral BID Pepe Faye M.D.   50 mg at 05/31/22 0641   • LORazepam (ATIVAN) injection 2 mg  2 mg Intravenous Q6HRS PRN Pepe Faye M.D.       • promethazine (PHENERGAN) tablet 25 mg  25 mg Oral Q6HRS PRN Pepe Faye M.D.       • ondansetron (ZOFRAN) syringe/vial injection 8 mg  8 mg Intravenous Q8HRS PRN Pepe Faye M.D.       • heparin lock flush 100 unit/mL injection 200 Units  200 Units Intravenous Q6HR AMBROSIO Lan.P.N.       • NS infusion   Intravenous Continuous Pepe Faye M.D. 125 mL/hr at 05/31/22 0842 New Bag at 05/31/22 0842   • acetaminophen (Tylenol) tablet 650 mg  650 mg Oral Q4HRS PRN AMBROSIO Mcclure.P.R.N.   650 mg at 05/28/22 1825   • cefepime (Maxipime) 2 g in dextrose 5% 20 mL IV syringe  2 g Intravenous Q8HRS Destini Jackson A.P.R.N.   Stopped at 05/31/22 0647   • sodium chloride (OCEAN) 0.65 % nasal spray 2 Spray  2 Spray Nasal Q2HRS PRN AMBROSIO Mcclure.P.R.N.       • senna-docusate (PERICOLACE or SENOKOT S) 8.6-50 MG per tablet 2 Tablet  2 Tablet Oral BID Margarito Hayward M.D.   2 Tablet at 05/31/22 0640    And   • polyethylene glycol/lytes (MIRALAX) PACKET 1 Packet  1 Packet Oral QDAY PRN Margarito Hayward M.D.        And   • magnesium hydroxide (MILK OF MAGNESIA) suspension 30 mL  30 mL Oral QDAY PRN Margarito Hayward M.D.        And   • bisacodyl (DULCOLAX) suppository 10 mg  10 mg Rectal QDAY PRN Margarito Hayward M.D.       • oxyCODONE immediate-release (ROXICODONE) tablet 2.5 mg  2.5 mg Oral Q3HRS PRN Margarito Hayward M.D.   2.5 mg at 05/31/22 0023    Or   • oxyCODONE immediate-release (ROXICODONE) tablet 5 mg  5 mg Oral Q3HRS PRN Margarito Hayward M.D.   5 mg at 05/29/22 0924    Or   • HYDROmorphone (Dilaudid) injection 0.25  mg  0.25 mg Intravenous Q3HRS PRN Margarito Hayward M.D.       • hydrALAZINE (APRESOLINE) injection 10 mg  10 mg Intravenous Q4HRS PRN Margarito Hayward M.D.       • febuxostat (ULORIC) tablet 120 mg  120 mg Oral DAILY Margarito Hayward M.D.   120 mg at 05/31/22 0641         LABORATORY VALUES:  - Laboratory data reviewed.      RECENT /SIGNIFICANT DIAGNOSTICS:  - Radiographs reviewed (see official reports).      ASSESSMENT:        JULY is a 21 y.o.male who was admitted to the PICU under the Peds H/O service for close laboratory monitoring and nursing care for his new diagnosis of high risk precursor B-cell lymphoblastic leukemia. The PICU team was asked to consult for possible future management should his clinical condition deteriorate or he develops any cardiorespiratory issues from hyperhydration and/or tumor lysis syndrome while chemotherapy is initiated.     Acute Problems:   Patient Active Problem List    Diagnosis Date Noted   • Pre B-cell acute lymphoblastic leukemia (ALL) ([facility]) 05/29/2022   • Neutropenic fever ([facility]) 05/28/2022   • Tachycardia with heart rate 100-120 beats per minute 05/28/2022   • Acute lymphoblastic leukemia in adult ([facility]) 05/27/2022   • PE (pulmonary thromboembolism) ([facility]) 05/27/2022   • Right leg pain 05/27/2022   • Back pain 05/27/2022   • Superficial vein thrombosis 05/27/2022   • Elevated blood uric acid level 05/27/2022   • Family history of diabetes mellitus 08/18/2020   • Callus of foot 08/18/2020   • Flat feet 04/03/2020   • Bilateral anterior knee pain 04/03/2020   • Bilateral ankle joint pain 04/03/2020   • Chronic midline thoracic back pain 04/03/2020       PLAN:     NEURO: Follow mental status, maintain comfort.    - Pain medication:    -Tylenol 650 mg Q4 hrs PO PRN   -Oxycodone 2.5 or 5 mg PO Q3 hrs prn moderate/severe pain   - Dilaudid 0.25 mg IV Q3 hrs pRN severe pain/patient NPO    -Anxiolysis   -Lorazepam 2 mg IV Q6 hrs prn     RESP: Maintain saturation in adequate range, monitor  for distress.   - Provide oxygen as indicated  - Monitor for pulmonary edema given hyperhydration    CV: Maintain normal hemodynamics.   - CRM monitoring indicated for any hypotension or dysrhythmia  -Hydralazine 10 mg IV Q 4 hrs prn SBP > 180 or DBP> 110    GI: Diet: Regular    #GI prophylaxis:  Famotidine 20 mg PO BID    #N/V:     -Zofran 8 mg IV Q8 HRS   -Promethazine 25 mg PO Q 6 hrs prn    #Bowel regimen   -Senna-Docusate 50 mg tab PO BID   -Miralax 1 packet PO daily prn constipation   -Magnesium hydroxide 30 mL PO daily prn constipation   -Dulcolax suppository 10 mg AL daily prn constipation    FEN/Renal/Endo:  #JACOBY now resolved   - IVF: NS @ 125 mL/hr  - Reviewed electrolytes    ID:   #Fever and Neutropenia:              - Febrile to 38.5C on 5/28/2022, Blood cultures drawn x2 - NGTD              - Cefepime 2 G Q8 HRS started   -Bactrim 80 mg tab (2 tabs) PO BID              - ANC 0              - Monitor clinical status, can broaden coverage if medically indicated      HEME/ONC:  #Precursor B-Cell Acute Lymphoblastic Leukemia  - Monitor as indicated.    - Repeat labs if not in normal range.  - Follow for any evidence of bleeding  -Prednisone 60 mg PO BID  - S/p Hydroxyurea (received < 72 hours)   - S/p Leukapharesis for hyperleukocytosis and risk for leukostasis  - CNS and testicular status to be determined  -Patient enrolled ON STUDY [id number].         - S/p Bone marrow biopsy/aspirate, LP with intrathecal cytarabine and PICC line placement with sedation            -               - Pretreatment work-up as below:                          CXR - unremarkable                          ECHO - PENDING                          Ferritin PENDING                          D-Dimer PENDING                          Fibrinogen 324 as of 5/29/2022                          PT/aPTT 16/36.5 as of 5/28/2022                          CMV, Varicella, HSV serologies PENDING     - Plan for start of Standard 4 Drug Induction after  evaluations are complete (on or off study)    #Hyperleukocytosis:              - Presenting WBC > 440 k/uL              - Right lower extremity thrombosis and subsegmental PE                 #Disease Related Pancytopenia:              - Transfuse for Hgb < 7 or symptomatic              - Transfuse for platelets < 10K or symptomatic (cautious transfusion while still with hyperleukocytosis)              - Neutropenic Precautions     #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:              - High tumor burden              - S/P Rasburicase x 1 dose              - Continue Febuxostat 120 mg PO daily               - TLS labs Q8 hours (potassium, uric acid, phosphorus, ionized calcium)              - Will space out labs when tumor burden has been reduced    #Hypercoagulability / Superficial Right LE Thrombus / Subsegmental PE:              - Heparin gtt              - Will transition to anticoagulation with apixiban per H/O    Access:               - Right IJ pheresis catheter (will plan to remove today if PICC line can be placed)              - Bilateral antecubital PIV              - Placement of PICC line (5/30 but removed), will attempt from Right today 5/31       #Social:              - Social Work Consultation              - Referral to [facility]    DISPO: Patient care and plans reviewed and directed with PICU team and Hem/Onc service.      _______           This is a critically ill patient for whom I have provided critical care services which include high complexity assessment and management necessary to support vital organ system function.     Critical care time: 45 minutes  Critical care time spent includes bedside evaluation, evaluation of medical data, discussion(s) with healthcare team and discussion(s) with the family.    The above note was signed by : Lia Hernandez DO, PICU Attending

## 2022-05-31 NOTE — DISCHARGE PLANNING
Case Management Discharge Planning    Admission Date: 5/27/2022  GMLOS: 10  ALOS: 4    6-Clicks ADL Score: 24  6-Clicks Mobility Score: 24      Anticipated Discharge Dispo: Discharge Disposition: Discharged to home/self care (01)    DME Needed: No    Action(s) Taken: DC Assessment Complete (See below)    Escalations Completed: None    Medically Clear: No    Next Steps: Chart review completed by this RN CM.  Patient lives with family in [place]. Patient's PCP is Dr. Arvizu.  Patient has [employer] HPN insurance.  No discharge needs noted at this time.  RN CM to follow and assist with any discharge planning needs.     Barriers to Discharge: Medical clearance    Is the patient up for discharge tomorrow: No

## 2022-05-31 NOTE — PROGRESS NOTES
During initial assessment this AM pt stated he was seeing double and experiencing some blurred vision. MD notified.

## 2022-05-31 NOTE — PROGRESS NOTES
Pt demonstrates ability to turn self in bed without assistance of staff. Patient and family understands importance in prevention of skin breakdown, ulcers, and potential infection. Hourly rounding in effect. RN skin check complete.   Devices in place include: Continuous monitoring devices, Silicone nasal cannula, PIV x2, IJ with dialysis catheter x1..  Skin assessed under devices: Yes.  Confirmed HAPI identified on the following date: N/A   Location of HAPI: N/A.  Wound Care RN following: No.  The following interventions are in place: Pt repositions self but pillows in use and ambulation encouraged as tolerated. Devices rotated with assessments, line dressings assessed and changed as needed.

## 2022-05-31 NOTE — PROGRESS NOTES
Justine from Lab called with critical result of WBC:88.2 at 0138. Critical lab result read back to Justine.   Dr. Faye notified of critical lab result at 0200.  Critical lab result read back by Dr. Faye.

## 2022-05-31 NOTE — PROGRESS NOTES
Called lab to find status of heparin Xa lab sent by the night shift nurse at 0630 this AM, lab stated they did not have the sample and it was not in sample handling either. Updated Dr. Hernandez and Dr. Rodriguez on status, no new orders to redraw the lab at this time, due to that we will discontinue heparin drip prior to PICC line placement.

## 2022-05-31 NOTE — PROGRESS NOTES
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      Laboratory values used to assess eligibility must be no older than seven (7) days at the start of therapy. Laboratory tests need not be repeated if therapy starts within seven (7) days of obtaining labs to assess eligibility. If a post-enrollment lab value is outside the limits of eligibility, or laboratory values are > 7 days old, then the following laboratory evaluations must be re-checked within 48 hours prior to initiating therapy: CBC with differential, bilirubin, ALT (SGPT) and serum creatinine. If the recheck is outside the limits of eligibility, the patient may not receive protocol therapy and will be considered off protocol therapy. Imaging studies, if applicable, must be obtained within 2 weeks prior to start of protocol therapy (repeat the tumor imaging if necessary).     ALL LABS OBTAINED FROM CURRENT ADMISSION AND < 7 DAYS FROM START OF SYSTEM THERAPY ON STUDY [id number].  PATIENT WITH CONFIRMED BCR-ABL TRANSLOCATION MAKING THEM INELIGIBLE FOR [id number], HOWEVER WILL BE ELIGIBLE FOR [id number] AS PATIENT IS LESS THAN 15 DAYS FROM START OF THERAPY. (MEETS CRITERIA)      3.2.1  Availability of Diagnostic Bone Marrow Sample     For patients enrolled on [id number] prior to enrollment on [id number], the required diagnostic bone marrow sample has been fulfilled.     PATIENT ENROLLED ON [id number] ON 2022.  DIAGNOSTIC BONE MARROW PERFORMED 2022.  REQUIRED DIAGNOSTIC BONE MARROW SAMPLE (PERIPHERAL BLOOD) HAS BEEN FULFILLED AND SHIPPED. (MEETS CRITERIA)    For patients who have not previously enrolled on [id number] prior to enrollment on [id number], a baseline diagnostic sample (or peripheral blood sample with blasts if marrow sample unavailable) must be available to develop an MRD probe. Please submit the sample within 72 hours of study entry. See Section 14.1 for specimen and shipping details.      AS ABOVE    In addition, laboratory reports detailing evidence of BCR-ABL1 fusion or ABL-class fusion must be submitted for rapid central review within 72 hours of study enrollment. Please see Section 18.0 for additional details. (MEETS CRITERIA)        LABORATORY REPORT DETAILING EVIDENCE OF BCR-ABL1 FUSION AVAILABLE AND WILL BE SUBMITTED FOR RAPID CENTRAL REVIEW WITHIN 72 HOURS OF ENROLLMENT.     3.2.2 Age at diagnosis     > 1 year and < 21 years at ALL diagnosis      PATIENT  2000,  21 YEARS OF AGE AT DATE OF EXPECTED ENROLLMENT. MEETS WITH ELIGIBILITY FOR ENROLLMENT (MEETS CRITERIA)  3.2.3 Diagnosis     3.2.3.1 Ph+ (BCR-ABL1 fusion): newly diagnosed de mindi ALL (B-ALL or T-ALL) or mixed phenotypic acute leukemia (MPAL meeting 2016 WHO definition) with definitive evidence of BCR-ABL1 fusion by karyotype, FISH and/or molecular methodologies.  (MEETS CRITERIA)    3.2.3.2 ABL-class fusion: newly diagnosed B-ALL with definitive evidence of ABL-class fusions. ABL-class fusions are defined as those involving the following genes: ABL1, ABL2, CSF1R, PDGFRB, PDGFRA. Methods of detection include fluorescence in-situ hybridization (FISH, e.g. using break-apart or colocalization signals probes),  multiplex or singleplex reverse-transcription polymerase chain reaction (RT-PCR), whole transcriptome or panel-based RNA-sequencing (e.g. Short Fuze RNA Pan-Cancer Panel; [employer], [place], [place], [place] or similar).     3.2.4 Prior Therapy     Please see Section 4.1 for the concomitant therapy restrictions for patients during treatment.     3.2.4.1 Ph+ patients must have previously started Induction therapy, which includes vincristine, a corticosteroid, pegaspargase, with or without anthracycline, and/or other standard cytotoxic chemotherapy.  (PATIENT TREATED ON STUDY [id number] PRIOR TO BCR-ABL IDENTIFICATION AT DAY 1 OF INDUCTION - MEETS CRITERIA)    3.2.4.2 Ph+ patients have not received more than 14 days of multiagent Induction therapy beginning with the first dose of vinCRIStine.   (DAY 1 OF INDUCTION ON STUDY [id number] 5/30/2022 - MEETS CRITERIA)    3.2.4.3 Ph+ patients may have started imatinib prior to study entry but have not received more than 14 days of imatinib.  (NO PRIOR THERAPY WITH IMATINIB - MEETS CRITERIA)    3.2.4.4 ABL-class fusion patients must have previously completed the 4 or 5 weeks of multiagent Induction chemotherapy (Induction IA phase).     3.2.4.5 ABL-class fusion patients may have started imatinib during Induction IA, at the same time of or after the first vinCRIStine dose.     3.2.5 Performance Status     Patients must have a performance status corresponding to ECOG scores of 0, 1, or 2.   Please refer to performance status scale per Appendix IX.     PATIENT WITH ECOG SCORE 2  (MEETS CRITERIA)    3.2.6 Organ Function Requirements     3.2.6.1 Adequate liver function defined as: Direct bilirubin ? 2 mg/dL.     DIRECT BILIRUBIN < 0.2 mg/dL 5/30/2022 PRIOR TO START OF THERAPY ON [id number]  (MEETS CRITERIA)    3.2.6.2 Adequate cardiac function defined as:     - Shortening fraction of >/= 27% by echocardiogram, or   - Ejection fraction of ?>/= 50% by radionuclide angiogram or echocardiogram.   -  Corrected QT Interval, QTc < 480mSec     ECHOCARDIOGRAM 5/30/2022 DEMONSTRATES EF 75%, SF  (MEETS CRITERIA), DEMONSTRATES SF 42% (MEETS CRITERIA - SEE ECHO BELOW)        EKG 5/28/2022 WITH QTc 413 ms - MEETS CRITERIA    Note: Repeat echocardiogram and electrocardiogram are not required if they were performed at or after initial ALL diagnosis, before study enrollment.     3.2.6.3 Adequate renal function is defined as:   - Creatinine clearance or radioisotope GFR ? 70 mL/min/1.73 m2 , or   - Serum creatinine within normal limits based on age/gender, as follows:   Age  Maximum Serum creatinine (mg/dL)    Male  Female    1 to < 2 years  0.6  0.6    2 to < 6 years  0.8  0.8    6 to < 10 years  1  1    10 to < 13 years  1.2  1.2    13 to < 16 years  1.5  1.4    > 16 years  1.7  1.4      MAXIMUM SERUM CREATININE 1.35 mg/dL on 5/27/2022 AT ADMISSION, RETURNED TO BASELINE ~0.8 mg/dL PRIOR TO [id number] ENROLLMENT ON 5/30/2022 - MEET CRITERIA    3.2.7 Exclusion Criteria     3.2.7.1 Known history of chronic myelogenous leukemia (CML). - NONE    3.2.7.2 ALL developing after a previous cancer treated with cytotoxic chemotherapy. - NONE    3.2.7.3 Active, uncontrolled infection or active systemic illness that requires ongoing vasopressor support or mechanical ventilation - NONE    3.2.7.4 Down syndrome - NONE    3.2.7.5 Pregnancy and Breast Feeding. - N/A   a. Female patients who are pregnant since fetal toxicities and teratogenic effects have been noted for several of the study drugs. A pregnancy test is required for female patients of childbearing potential.    b. Lactating females who plan to breastfeed their infants.    c. Sexually active patients of reproductive potential who have not agreed to use an effective contraceptive method for the duration of treatment according to protocol. - PATIENT AGREES TO USE EFFECTIVE CONTRACEPTIVE METHOD    3.2.7.6 Patients with congenital long QT syndrome, history of ventricular arrhythmias  or heart block. - NONE    3.2.7.7 Prior treatment with dasatinib, or any TKI other than imatinib. - NONE    3.2.8 Regulatory Requirements     3.2.8.1 All patients and/or their parents or legal guardians must sign a written informed consent. - MEETS CRITERIA    3.2.8.2 All institutional, FDA, and NCI requirements for human studies must be met - MEETS CRITERIA

## 2022-05-31 NOTE — PROGRESS NOTES
Pt demonstrates ability to turn self in bed without assistance of staff. Patient and family understands importance in prevention of skin breakdown, ulcers, and potential infection. Hourly rounding in effect. RN skin check complete.   Devices in place include: monitoring equipment, NC, PIV x2, HD catheter.  Skin assessed under devices: Yes.  Confirmed HAPI identified on the following date: NA   Location of HAPI: NA.  Wound Care RN following: No.  The following interventions are in place: site of monitoring equipment rotated routinely, pillows provided for support and repositioning.

## 2022-05-31 NOTE — PROGRESS NOTES
Received report from Belinda OSORIO RN. Patient viewed, needs addressed, board updated, hourly rounding in place.    Concern for site of chemotherapy infusion, spoke with Neyda FULLER - requesting to use the HD catheter for chemotherapy, Neyda FULLER has obtained approval for HD catheter use from nephrology and radiology, this RN spoke with Eric Viveros about ability to use HD catheter for chemotherapy administration, rEic confirmed with Cherrie Decker PharmD that line is OK to use. Care team all in agreement that HD catheter is OK to use for chemotherapy infusion.

## 2022-05-31 NOTE — PROGRESS NOTES
Pt prepped for sedation and all consent obtained and signed, placed in chart. Patient agreeable to plan of care. Dr. Faye and Dr. Hernandez to bedside with pharmacist and RN who also remained at bedside throughout procedure. BVM set up and on along with suction and continuous monitoring devices. All emergency equipment prepped and ready at bedside if needed.  Patient tolerated procedure well, chest x-ray ordered to obtain verification of PICC line placement prior to use. Patient's vitals stable, time out of procedure called at 1358. Procedure end time at 1519.

## 2022-05-31 NOTE — CARE PLAN
The patient is Unstable - High likelihood or risk of patient condition declining or worsening    Shift Goals  Clinical Goals: pain management, monitor labs, stable VS  Patient Goals: rest  Family Goals: understand POC, rest    Progress made toward(s) clinical / shift goals:        Problem: Pain - Standard  Goal: Alleviation of pain or a reduction in pain to the patient’s comfort goal  Outcome: Progressing  Note: Managed with current PO medications     Problem: Optimal Care of the Patient with VTE  Goal: Complications related to the disease process, condition or treatment will be avoided or minimized  Outcome: Progressing     Problem: Fluid Volume  Goal: Fluid volume balance will be maintained  Outcome: Progressing     Problem: Urinary Elimination  Goal: Establish and maintain regular urinary output  Outcome: Progressing     Problem: Skin Integrity  Goal: Skin integrity is maintained or improved  Outcome: Progressing       Patient is not progressing towards the following goals:      Problem: Respiratory  Goal: Patient will achieve/maintain optimum respiratory ventilation and gas exchange  Outcome: Not Progressing  Note: Remained on NC @ 2L, unable to wean due to desaturations

## 2022-05-31 NOTE — DISCHARGE PLANNING
Will follow for any acute needs. Patient followed by Peds Oncology and REGIS Swenson are of diagnosis.

## 2022-05-31 NOTE — PROGRESS NOTES
"Introduced Child Life Services to pt. No family currently at bedside (Pt said, all of his family lives here and lots of support). 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 He has finished this semester and just started an internship at [employer] and is a bit worried about this diagnosis interfering. He wants to do some traveling, because he really hasn't. Last week pt was in [place] for his grandfather's , and also had the chance to go to [employer]. He did a lot of walking, and thought that was some of the reason for the pain he had been having. Pt has been seeing double this morning, which was making him nauseated. Pt got a PICC line yesterday, but sounds like it has to be removed due to position and a new one put in today. Pt has been NPO. Pt says \"he has always has a plan of how things are going to go and this wasn't part of the plan, so I just have to do this..\" Emotional support provided.  Denied any needs at this time. Will continue to provide support and follow.   "

## 2022-05-31 NOTE — PROGRESS NOTES
Received call from lab regarding 1200 lab draw, the sample appeared to be diluted and was drawn from the PIV, updated Dr. Rodriguez and told lab to replace the order and will recollect after PICC line insertion.

## 2022-06-01 LAB
ANION GAP SERPL CALC-SCNC: 10 MMOL/L (ref 7–16)
ANION GAP SERPL CALC-SCNC: 10 MMOL/L (ref 7–16)
ANION GAP SERPL CALC-SCNC: 8 MMOL/L (ref 7–16)
ANISOCYTOSIS BLD QL SMEAR: ABNORMAL
ANISOCYTOSIS BLD QL SMEAR: ABNORMAL
BASOPHILS # BLD AUTO: 0 % (ref 0–1.8)
BASOPHILS # BLD AUTO: 0 % (ref 0–1.8)
BASOPHILS # BLD: 0 K/UL (ref 0–0.12)
BASOPHILS # BLD: 0 K/UL (ref 0–0.12)
BLASTS NFR BLD MANUAL: 73 %
BLASTS NFR BLD MANUAL: 76.1 %
BUN SERPL-MCNC: 14 MG/DL (ref 8–22)
BUN SERPL-MCNC: 15 MG/DL (ref 8–22)
BUN SERPL-MCNC: 16 MG/DL (ref 8–22)
CALCIUM SERPL-MCNC: 7.6 MG/DL (ref 8.5–10.5)
CALCIUM SERPL-MCNC: 7.7 MG/DL (ref 8.5–10.5)
CALCIUM SERPL-MCNC: 8.1 MG/DL (ref 8.5–10.5)
CHLORIDE SERPL-SCNC: 102 MMOL/L (ref 96–112)
CHLORIDE SERPL-SCNC: 104 MMOL/L (ref 96–112)
CHLORIDE SERPL-SCNC: 104 MMOL/L (ref 96–112)
CMV IGG SERPL IA-ACNC: 2.4 U/ML
CMV IGM SERPL IA-ACNC: <8 AU/ML
CO2 SERPL-SCNC: 25 MMOL/L (ref 20–33)
CO2 SERPL-SCNC: 25 MMOL/L (ref 20–33)
CO2 SERPL-SCNC: 26 MMOL/L (ref 20–33)
CREAT SERPL-MCNC: 0.57 MG/DL (ref 0.5–1.4)
CREAT SERPL-MCNC: 0.65 MG/DL (ref 0.5–1.4)
CREAT SERPL-MCNC: 0.66 MG/DL (ref 0.5–1.4)
EOSINOPHIL # BLD AUTO: 0 K/UL (ref 0–0.51)
EOSINOPHIL # BLD AUTO: 0 K/UL (ref 0–0.51)
EOSINOPHIL NFR BLD: 0 % (ref 0–6.9)
EOSINOPHIL NFR BLD: 0 % (ref 0–6.9)
ERYTHROCYTE [DISTWIDTH] IN BLOOD BY AUTOMATED COUNT: 52 FL (ref 35.9–50)
ERYTHROCYTE [DISTWIDTH] IN BLOOD BY AUTOMATED COUNT: 52.7 FL (ref 35.9–50)
FIBRINOGEN PPP-MCNC: 362 MG/DL (ref 215–460)
FIBRINOGEN PPP-MCNC: 376 MG/DL (ref 215–460)
GFR SERPLBLD CREATININE-BSD FMLA CKD-EPI: 137 ML/MIN/1.73 M 2
GFR SERPLBLD CREATININE-BSD FMLA CKD-EPI: 138 ML/MIN/1.73 M 2
GFR SERPLBLD CREATININE-BSD FMLA CKD-EPI: 143 ML/MIN/1.73 M 2
GLUCOSE SERPL-MCNC: 139 MG/DL (ref 65–99)
GLUCOSE SERPL-MCNC: 165 MG/DL (ref 65–99)
GLUCOSE SERPL-MCNC: 193 MG/DL (ref 65–99)
HCT VFR BLD AUTO: 25.4 % (ref 42–52)
HCT VFR BLD AUTO: 25.8 % (ref 42–52)
HGB BLD-MCNC: 8.5 G/DL (ref 14–18)
HGB BLD-MCNC: 8.6 G/DL (ref 14–18)
HSV1+2 IGG SER IA-ACNC: >22.4 IV
LYMPHOCYTES # BLD AUTO: 3.79 K/UL (ref 1–4.8)
LYMPHOCYTES # BLD AUTO: 6.21 K/UL (ref 1–4.8)
LYMPHOCYTES NFR BLD: 19 % (ref 22–41)
LYMPHOCYTES NFR BLD: 20.5 % (ref 22–41)
MANUAL DIFF BLD: ABNORMAL
MANUAL DIFF BLD: ABNORMAL
MCH RBC QN AUTO: 29.8 PG (ref 27–33)
MCH RBC QN AUTO: 29.9 PG (ref 27–33)
MCHC RBC AUTO-ENTMCNC: 33.3 G/DL (ref 33.7–35.3)
MCHC RBC AUTO-ENTMCNC: 33.5 G/DL (ref 33.7–35.3)
MCV RBC AUTO: 89.3 FL (ref 81.4–97.8)
MCV RBC AUTO: 89.4 FL (ref 81.4–97.8)
MICROCYTES BLD QL SMEAR: ABNORMAL
MICROCYTES BLD QL SMEAR: ABNORMAL
MONOCYTES # BLD AUTO: 0 K/UL (ref 0–0.85)
MONOCYTES # BLD AUTO: 0.26 K/UL (ref 0–0.85)
MONOCYTES NFR BLD AUTO: 0 % (ref 0–13.4)
MONOCYTES NFR BLD AUTO: 0.8 % (ref 0–13.4)
MORPHOLOGY BLD-IMP: NORMAL
MORPHOLOGY BLD-IMP: NORMAL
NEUTROPHILS # BLD AUTO: 0.63 K/UL (ref 1.82–7.42)
NEUTROPHILS # BLD AUTO: 2.35 K/UL (ref 1.82–7.42)
NEUTROPHILS NFR BLD: 2.6 % (ref 44–72)
NEUTROPHILS NFR BLD: 5.6 % (ref 44–72)
NEUTS BAND NFR BLD MANUAL: 0.8 % (ref 0–10)
NEUTS BAND NFR BLD MANUAL: 1.6 % (ref 0–10)
NRBC # BLD AUTO: 0 K/UL
NRBC # BLD AUTO: 0 K/UL
NRBC BLD-RTO: 0 /100 WBC
NRBC BLD-RTO: 0 /100 WBC
PHOSPHATE SERPL-MCNC: 3.2 MG/DL (ref 2.5–4.5)
PHOSPHATE SERPL-MCNC: 3.4 MG/DL (ref 2.5–4.5)
PHOSPHATE SERPL-MCNC: 3.8 MG/DL (ref 2.5–4.5)
PLATELET # BLD AUTO: 44 K/UL (ref 164–446)
PLATELET # BLD AUTO: 51 K/UL (ref 164–446)
PLATELET BLD QL SMEAR: NORMAL
PLATELET BLD QL SMEAR: NORMAL
PMV BLD AUTO: 9.4 FL (ref 9–12.9)
PMV BLD AUTO: 9.8 FL (ref 9–12.9)
POTASSIUM SERPL-SCNC: 3.7 MMOL/L (ref 3.6–5.5)
POTASSIUM SERPL-SCNC: 4.2 MMOL/L (ref 3.6–5.5)
POTASSIUM SERPL-SCNC: 4.2 MMOL/L (ref 3.6–5.5)
RBC # BLD AUTO: 2.84 M/UL (ref 4.7–6.1)
RBC # BLD AUTO: 2.89 M/UL (ref 4.7–6.1)
RBC BLD AUTO: PRESENT
RBC BLD AUTO: PRESENT
SMUDGE CELLS BLD QL SMEAR: NORMAL
SODIUM SERPL-SCNC: 137 MMOL/L (ref 135–145)
SODIUM SERPL-SCNC: 138 MMOL/L (ref 135–145)
SODIUM SERPL-SCNC: 139 MMOL/L (ref 135–145)
URATE SERPL-MCNC: 2.5 MG/DL (ref 2.5–8.3)
URATE SERPL-MCNC: 2.9 MG/DL (ref 2.5–8.3)
URATE SERPL-MCNC: 3.1 MG/DL (ref 2.5–8.3)
WBC # BLD AUTO: 18.5 K/UL (ref 4.8–10.8)
WBC # BLD AUTO: 32.7 K/UL (ref 4.8–10.8)

## 2022-06-01 PROCEDURE — 99233 SBSQ HOSP IP/OBS HIGH 50: CPT | Performed by: PEDIATRICS

## 2022-06-01 PROCEDURE — A9270 NON-COVERED ITEM OR SERVICE: HCPCS | Performed by: INTERNAL MEDICINE

## 2022-06-01 PROCEDURE — 85025 COMPLETE CBC W/AUTO DIFF WBC: CPT | Mod: 91

## 2022-06-01 PROCEDURE — 770001 HCHG ROOM/CARE - MED/SURG/GYN PRIV*

## 2022-06-01 PROCEDURE — 700105 HCHG RX REV CODE 258: Performed by: PEDIATRICS

## 2022-06-01 PROCEDURE — 700111 HCHG RX REV CODE 636 W/ 250 OVERRIDE (IP): Performed by: NURSE PRACTITIONER

## 2022-06-01 PROCEDURE — 84100 ASSAY OF PHOSPHORUS: CPT

## 2022-06-01 PROCEDURE — 700102 HCHG RX REV CODE 250 W/ 637 OVERRIDE(OP): Performed by: INTERNAL MEDICINE

## 2022-06-01 PROCEDURE — 84550 ASSAY OF BLOOD/URIC ACID: CPT

## 2022-06-01 PROCEDURE — 700105 HCHG RX REV CODE 258: Performed by: NURSE PRACTITIONER

## 2022-06-01 PROCEDURE — 80048 BASIC METABOLIC PNL TOTAL CA: CPT

## 2022-06-01 PROCEDURE — 700111 HCHG RX REV CODE 636 W/ 250 OVERRIDE (IP): Performed by: PEDIATRICS

## 2022-06-01 PROCEDURE — 700102 HCHG RX REV CODE 250 W/ 637 OVERRIDE(OP): Performed by: PEDIATRICS

## 2022-06-01 PROCEDURE — A9270 NON-COVERED ITEM OR SERVICE: HCPCS | Performed by: PEDIATRICS

## 2022-06-01 PROCEDURE — 85384 FIBRINOGEN ACTIVITY: CPT

## 2022-06-01 PROCEDURE — 85007 BL SMEAR W/DIFF WBC COUNT: CPT | Mod: 91

## 2022-06-01 RX ADMIN — APIXABAN 10 MG: 5 TABLET, FILM COATED ORAL at 06:02

## 2022-06-01 RX ADMIN — ALLOPURINOL 100 MG: 100 TABLET ORAL at 22:01

## 2022-06-01 RX ADMIN — ONDANSETRON 8 MG: 2 INJECTION INTRAMUSCULAR; INTRAVENOUS at 05:55

## 2022-06-01 RX ADMIN — ONDANSETRON 8 MG: 2 INJECTION INTRAMUSCULAR; INTRAVENOUS at 21:55

## 2022-06-01 RX ADMIN — PREDNISONE 60 MG: 10 TABLET ORAL at 06:01

## 2022-06-01 RX ADMIN — FAMOTIDINE 20 MG: 20 TABLET, FILM COATED ORAL at 18:37

## 2022-06-01 RX ADMIN — PREDNISONE 60 MG: 10 TABLET ORAL at 18:36

## 2022-06-01 RX ADMIN — SODIUM CHLORIDE: 9 INJECTION, SOLUTION INTRAVENOUS at 04:11

## 2022-06-01 RX ADMIN — CEFEPIME 2 G: 2 INJECTION, POWDER, FOR SOLUTION INTRAVENOUS at 05:56

## 2022-06-01 RX ADMIN — FAMOTIDINE 20 MG: 20 TABLET, FILM COATED ORAL at 05:56

## 2022-06-01 RX ADMIN — POLYETHYLENE GLYCOL 3350 1 PACKET: 17 POWDER, FOR SOLUTION ORAL at 06:19

## 2022-06-01 RX ADMIN — ALLOPURINOL 200 MG: 100 TABLET ORAL at 14:03

## 2022-06-01 RX ADMIN — SODIUM CHLORIDE: 9 INJECTION, SOLUTION INTRAVENOUS at 21:55

## 2022-06-01 RX ADMIN — ALLOPURINOL 100 MG: 100 TABLET ORAL at 21:54

## 2022-06-01 RX ADMIN — HEPARIN 200 UNITS: 100 SYRINGE at 04:07

## 2022-06-01 RX ADMIN — ALLOPURINOL 200 MG: 100 TABLET ORAL at 05:55

## 2022-06-01 RX ADMIN — SENNOSIDES AND DOCUSATE SODIUM 2 TABLET: 50; 8.6 TABLET ORAL at 18:37

## 2022-06-01 RX ADMIN — ONDANSETRON 8 MG: 2 INJECTION INTRAMUSCULAR; INTRAVENOUS at 14:03

## 2022-06-01 RX ADMIN — APIXABAN 10 MG: 5 TABLET, FILM COATED ORAL at 18:37

## 2022-06-01 RX ADMIN — SENNOSIDES AND DOCUSATE SODIUM 2 TABLET: 50; 8.6 TABLET ORAL at 05:56

## 2022-06-01 ASSESSMENT — PAIN DESCRIPTION - PAIN TYPE
TYPE: ACUTE PAIN

## 2022-06-01 ASSESSMENT — FIBROSIS 4 INDEX: FIB4 SCORE: 3.97

## 2022-06-01 NOTE — CARE PLAN
Problem: Respiratory  Goal: Patient will achieve/maintain optimum respiratory ventilation and gas exchange  Outcome: Progressing     Problem: Fluid Volume  Goal: Fluid volume balance will be maintained  Outcome: Progressing     The patient is Watcher - Medium risk of patient condition declining or worsening    Shift Goals  Clinical Goals: adequate pain control, stable hemodynamics, stable neuro status  Patient Goals: to sleep  Family Goals: for pt to get rest and for updates in the AM on overnight events    Progress made toward(s) clinical / shift goals:  no c/o pain overnight, pt rested comfortably, hemodynamics stable with no need for PRN hypertension medications, neuro status improved noc once pt rested - back to baseline with some fatigue, pt having adequate UO    Patient is not progressing towards the following goals: NA

## 2022-06-01 NOTE — PROGRESS NOTES
"Pediatric Hematology/Oncology  Daily Progress Note      Patient Name:  Tomas Jean-Baptiste  : 2000  MRN: [id number]    Location of Service: PICU (floor status)  Date of Service: 2022  Time: 5:23 PM    Hospital Day: 5    Patient Active Problem List   Diagnosis   • Flat feet   • Bilateral anterior knee pain   • Bilateral ankle joint pain   • Chronic midline thoracic back pain   • Family history of diabetes mellitus   • Callus of foot   • Acute lymphoblastic leukemia in adult ([facility])   • PE (pulmonary thromboembolism) ([facility])   • Right leg pain   • Back pain   • Superficial vein thrombosis   • Elevated blood uric acid level   • Neutropenic fever ([facility])   • Tachycardia with heart rate 100-120 beats per minute   • Pre B-cell acute lymphoblastic leukemia (ALL) ([facility])       SUBJECTIVE:   GARETH was complaining of blurry vision and intermittent diplopia earlier this morning.  This is somewhat improved and may relate largely to problems with his eyeglasses.  He also complains of mild dyspnea after exertion (going to the restroom and back), but his mother reports that he \"looks a lot better\" today than he did yesterday.    Earlier this afternoon, his pheresis catheter was removed and a new PICC line has been placed in his right upper extremity.    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  HENT: Negative for ear pain, nosebleeds, congestion, rhinorrhea, sore throat, mouth sores..  Respiratory: Negative for cough.     Gastrointestinal: Negative for nausea, vomiting, abdominal pain, diarrhea, constipation.    Musculoskeletal: Right calf pain is better.  Skin: Negative for rash or skin infection.  Neurological: Negative for numbness, tingling, sensory changes, weakness or headaches.    Endo/Heme/Allergies: No bruising/bleeding easily.      Medications:    Current Facility-Administered Medications:   •  apixaban " (ELIQUIS) tablet 10 mg, 10 mg, Oral, BID **FOLLOWED BY** [START ON 6/7/2022] apixaban (ELIQUIS) tablet 5 mg, 5 mg, Oral, BID, River Rodriguez M.D.  •  lidocaine-prilocaine (EMLA) 2.5-2.5 % cream 1 Application, 1 Application, Topical, PRN, Lia Hernandez D.O., 1 Application at 05/30/22 1324  •  [START ON 6/4/2022] sulfamethoxazole-trimethoprim (BACTRIM) 400-80 MG per tablet 2 Tablet, 2 Tablet, Oral, 2 times per day on Sun Sat, Pepe Faye M.D.  •  famotidine (PEPCID) tablet 20 mg, 20 mg, Oral, BID, Pepe Faye M.D., 20 mg at 05/31/22 0640  •  ondansetron (ZOFRAN) syringe/vial injection 8 mg, 8 mg, Intravenous, Q8HRS, Pepe Faye M.D., 8 mg at 05/31/22 1410  •  predniSONE (DELTASONE) tablet 60 mg, 60 mg, Oral, BID, Pepe Faye M.D., 50 mg at 05/31/22 0641  •  LORazepam (ATIVAN) injection 2 mg, 2 mg, Intravenous, Q6HRS PRN, Pepe Faye M.D.  •  promethazine (PHENERGAN) tablet 25 mg, 25 mg, Oral, Q6HRS PRN, Pepe Faye M.D.  •  ondansetron (ZOFRAN) syringe/vial injection 8 mg, 8 mg, Intravenous, Q8HRS PRN, Pepe Faye M.D.  •  heparin lock flush 100 unit/mL injection 200 Units, 200 Units, Intravenous, Q6HR, AMBROSIO Lan.P.NReid  •  NS infusion, , Intravenous, Continuous, Pepe Faye M.D., Last Rate: 125 mL/hr at 05/31/22 0842, New Bag at 05/31/22 0842  •  acetaminophen (Tylenol) tablet 650 mg, 650 mg, Oral, Q4HRS PRN, AMBROSIO Mcclure.P.R.N., 650 mg at 05/28/22 1825  •  cefepime (Maxipime) 2 g in dextrose 5% 20 mL IV syringe, 2 g, Intravenous, Q8HRS, MANDA McclurePReidRReidN., Stopped at 05/31/22 1415  •  sodium chloride (OCEAN) 0.65 % nasal spray 2 Spray, 2 Spray, Nasal, Q2HRS PRN, JASS McclureRReidN.  •  senna-docusate (PERICOLACE or SENOKOT S) 8.6-50 MG per tablet 2 Tablet, 2 Tablet, Oral, BID, 2 Tablet at 05/31/22 0640 **AND** polyethylene glycol/lytes (MIRALAX) PACKET 1 Packet, 1 Packet, Oral, QDAY PRN **AND** magnesium hydroxide (MILK OF MAGNESIA) suspension 30 mL, 30 mL,  "Oral, QDAY PRN **AND** bisacodyl (DULCOLAX) suppository 10 mg, 10 mg, Rectal, QDAY PRN, Margarito Hayward M.D.  •  oxyCODONE immediate-release (ROXICODONE) tablet 2.5 mg, 2.5 mg, Oral, Q3HRS PRN, 2.5 mg at 22 0023 **OR** oxyCODONE immediate-release (ROXICODONE) tablet 5 mg, 5 mg, Oral, Q3HRS PRN, 5 mg at 22 0924 **OR** HYDROmorphone (Dilaudid) injection 0.25 mg, 0.25 mg, Intravenous, Q3HRS PRN, Margarito Hayward M.D.  •  hydrALAZINE (APRESOLINE) injection 10 mg, 10 mg, Intravenous, Q4HRS PRN, Margarito Hayward M.D.  •  febuxostat (ULORIC) tablet 120 mg, 120 mg, Oral, DAILY, Margarito Hayward M.D., 120 mg at 22 0641    OBJECTIVE:     Max Temp: Temp (24hrs), Av.6 °C (97.9 °F), Min:35.9 °C (96.7 °F), Max:37.3 °C (99.2 °F)      Vitals: BP (!) 142/73   Pulse 88   Temp 36.4 °C (97.6 °F) (Oral)   Resp 15   Ht 1.651 m (5' 5\")   Wt 77 kg (169 lb 12.1 oz)   SpO2 100%   BMI 28.25 kg/m²       Intake/Output Summary (Last 24 hours) at 2022 1723  Last data filed at 2022 1200  Gross per 24 hour   Intake 4765.27 ml   Output 4300 ml   Net 465.27 ml       Today's weight is 77 kg, down 1.4 kg from yesterday.    Labs:   Latest Reference Range & Units 22 00:30 22 04:00   WBC 4.8 - 10.8 K/uL 88.2 (HH) [1] 85.5 (HH) [2]   Hemoglobin 14.0 - 18.0 g/dL 8.6 (L) [3] 8.7 (L)   Hematocrit 42.0 - 52.0 % 26.0 (L) 26.5 (L)   Platelet Count 164 - 446 K/uL 89 (L) [4] 76 (L)   Neutrophils-Polys 44.00 - 72.00 % 2.50 (L) 0.90 (L)   Neutrophils (Absolute) 1.82 - 7.42 K/uL 2.21 [5] 0.77 (L) [6]   Lymphocytes 22.00 - 41.00 % 23.50 41.50 (H)   Monocytes 0.00 - 13.40 % 0.90 0.00   Monos (Absolute) 0.00 - 0.85 K/uL 0.79 0.00   Myelocytes % 0.80    Blasts % 72.30 (HH) 57.60 (HH)      Latest Reference Range & Units 22 20:00 22 04:00   Sodium 135 - 145 mmol/L 139 137   Potassium 3.6 - 5.5 mmol/L 3.8 4.1   Chloride 96 - 112 mmol/L 102 101   Co2 20 - 33 mmol/L 25 25   Anion Gap 7.0 - 16.0  12.0 11.0   Glucose " 65 - 99 mg/dL 90 123 (H)   Bun 8 - 22 mg/dL 7 (L) 9   Creatinine 0.50 - 1.40 mg/dL 0.69 0.66   Calcium 8.5 - 10.5 mg/dL 7.7 (L) 8.1 (L)   Phosphorus 2.5 - 4.5 mg/dL 3.1 3.6   Uric Acid 2.5 - 8.3 mg/dL 2.6 3.2     Physical Exam:    Constitutional: Pleasant, cooperative, NAD.   HENT: Normocephalic and atraumatic. Nasal cannula oxygen, no rhinorrhea. Oropharynx is clear and moist.   Eyes: Conjunctivae are normal. No scleral icterus. EOMI.  Neck: Supple, no adenopathy.    Cardiovascular: RRR w/o murmur.  Pulmonary/Chest: Effort normal. Symmetric expansion.  Clear to auscultation bilaterally.  Abdomen: Soft. Bowel sounds are normal. No distension, organomegaly or mass.     Neurological: Alert and oriented to person and place. Exhibits normal muscle tone bilaterally in upper and lower extremities. Coordination grossly normal.    Skin: Skin is warm, dry and pink.  No rash or evidence of skin infection.   Psychiatric: Calm, pleasant, polite.      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     1) Precursor B-Cell Acute Lymphoblastic Leukemia:              - 2-3 weeks of symptoms              - Presenting WBC > 440 k/uL, hyperleukocytosis              - Start of Hydroxyurea (1500 mg PO Q8) 2320 on 5/27/2022  - discontinued on May 30 (55 hours of hydroxyurea < 72h allowed)              - Leukapharesis for hyperleukocytosis and risk for leukostasis - pheresed yesterday AM - post pharesis WBC count 71.3 k/uL              - No steroid pretreatment              - Peripheral flow cytometry remarkable for very large population of CD10 pos, CD19 pos, CD 20 negative lymphoblasts (communicated from Dr. Darden - Hematopathology 5/29/2022)               - CNS status PENDING (1 WBC, 3 RBC - awaiting pathology review)              - Testicular status NEGATIVE                     - Meets with all criteria for [id number]              -  [id number] offered to patient.  Risks and benefits of study discussed in detail (see separate consent notes).  All questions answered.  Consent for [id number] signed.  Patient enrolled ON STUDY [id number].  Biobanking samples to be obtained at time of bone marrow biopsy and aspirate today                 - Given the following 1) Enrollment on [id number] 2) Patient meeting with all criteria for enrollment on [facility] [id number] 3) and Patient not meeting with any exclusion criteria for enrollment on COG [id number],  treatment ON STUDY COG [id number] offered to patient.  The risks, benefits, alternatives and risks and benefits of the alternatives were discussed with the patient and his mother.  Patient HR by age alone.  The patient and his mother were given an opportunity to ask questions and all questions were answered before Tomas signed consent for enrollment ON STUDY [id number].     - New finding of Ph1 positivity makes JULY eligible for [id number] (treatment including imatinib).  Dr Faye has introduced this possibility to JULY and his mother, planning a more throough discussion tomorrow.                            - Pretreatment work-up as below:                          CBC: WBC 72.3, Hgb 6.5, platelets 53K (ANC 3250, Abs Peripheral Blast Count 52,923) (just prior to BM/study labs 5/30/2022)                          CMP:  AST 41, ALT 22, total bili 0.3, direct bili <0.2                          CXR - unremarkable                          ECHO - EF 75% (will have ECHO reviewed again for measurement of SF)                          Ferritin PENDING                          D-Dimer elevated at 1.05 5/30/2022                          Fibrinogen 324 as of 5/29/2022                          PT/aPTT 16/36.5 as of 5/28/2022                          CMV, Varicella, HSV serologies PENDING                                        High Risk Acute B-Lymphoblastic Leukemia, TRSQ1199(OS), Induction, Day 2                          ** Cytarabine 70 mg IT  x1 dose on Day 1 (see separate procedure note)                          ** Vincristine 2 mg IV on Days 1 (COMPLETE), 8, 15 and 22                          ** Daunorubicin 47.5 mg IV on Days 1 (COMPLETE), 8, 15 and 22                          ** PEG Aspargase 2500 IU/m2/dose on Day 4                          ** Prednisone 60 mg PO BID on Day 2 of 28                          ** Methotrexate IT on Days 8 and 29     2) Hyperleukocytosis (RESOLVING) see:              - Presenting WBC > 440 k/uL              - Right lower extremity thrombosis and subsegmental PE              - With the exception of blood clots, no other evidence of leukostasis              - Hydroxyurea for cytoreduction used less than 72 hours, discontinued yesterday              - Leukapheresis discontinued yesterday; catheter removed today              - TLS as below     3) Disease Related Pancytopenia:              - Worsening anemia with hemoglobin 6.5 g/dL: pRBCs transfused yesterday              - Thrombocytopenia with platelet count 76k after tx yesterday              - Transfuse for Hgb < 7 or symptomatic              - Transfuse for platelets < 10K or symptomatic (cautious transfusion while still with hyperleukocytosis)              - Neutropenic Precautions                 4) Fever and Neutropenia:              - Febrile to 38.5C on 5/28/2022 at 0945 -no fever since              - Blood cultures drawn x2 - NGTD              - Started and continued on empiric Cefepime              - ANC 3250 but functionally neutropenic              - Monitor clinical status, can broaden coverage if medically indicated     5) 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:              - On presentation Uric Acid 15.6, LDH 1114              - High tumor burden              - S/P Rasburicase x 1 dose              - Switching today to allopurinol 200 mg po tid               - Hyperhydration of NS at 125 mL/h -some evidence of fluid overload, monitor fluid status  closely              - TLS labs Q8 hours (potassium, uric acid, phosphorus, ionized calcium)              - Potassium 5.0, ionized calcium 1.1, uric acid 3.0, phosphorus 2.5              - Will space out labs when tumor burden has been reduced     6) Hypercoagulability / Superficial Right LE Thrombus / Subsegmental PE:              - Anticoagulation held for lumbar puncture yesterday              - Restarted heparin drip following procedure targeting therapeutic anti-Xa              - Platelets transfused allow for hemostasis during procedure, 53K prior to procedure              - Will transition to apixaban today for anticoagulation through induction, likely continue to complete a 3-monthcourse     7) Acute Kidney Injury:              - Creatinine stable, 0.86 this morning              - Continue with hyperhydration as well as febuxostat     8) Social:              - Social Work Consultation              - Referral to [facility]              - Continue support     Access:               - Right IJ pheresis catheter              - Bilateral antecubital PIV              - Placement of PICC line today              - Will remove IJ when stable WBC < 100 k/uL     Disposition:  Inpatient for treatment of Precursor B-Cell Acute Lymphoblastic Leukemia.  Will continue standard 4-drug induction as per [id number] but planning switch to [id number] on Day 15 (adding imatinib).    Discussed with nursing staff, Dr. Faye.  Total time today approx 60 minutes.    ANN MARIE Rodriguez MD  Pediatric Hematology / Oncology  [facility]  Cell. [phone]  Office. [phone]

## 2022-06-01 NOTE — DISCHARGE PLANNING
Patient discussed with team during rounds.    Call placed to Lisa at [facility] HPN and clinical updated provided.

## 2022-06-01 NOTE — PROGRESS NOTES
Pt demonstrates ability to turn self in bed without assistance of staff. Patient understands importance in prevention of skin breakdown, ulcers, and potential infection. Hourly rounding in effect. RN skin check complete.     Devices in place include: x3 EKG leads, pulse ox probe, BP cuff, x1 PIV, x1 PICC line, nasal cannula.  Skin assessed under devices: Yes.  Confirmed HAPI identified on the following date: No   Location of HAPI: Na.  Wound Care RN following: No.  The following interventions are in place: Medical devices repositioned when needed, peripheral access assessed Q4hrs for infiltration/skin breakdown, central line assessed Q2hrs for infiltration/skin breakdown, pt repositioning self often, skin under nasal cannula assessed Q4hrs for skin breakdown-devices repositioned during shift often by this RN and pt.

## 2022-06-01 NOTE — CARE PLAN
Problem: Knowledge Deficit - Standard  Goal: Patient and family/care givers will demonstrate understanding of plan of care, disease process/condition, diagnostic tests and medications  Outcome: Progressing     Problem: Pain - Standard  Goal: Alleviation of pain or a reduction in pain to the patient’s comfort goal  Outcome: Progressing     Problem: Respiratory  Goal: Patient will achieve/maintain optimum respiratory ventilation and gas exchange  Outcome: Progressing     Problem: Knowledge Deficit - VTE  Goal: Patient and family/care givers will demonstrate understanding of plan of care, disease process/condition, diagnostic tests and medications  Outcome: Progressing   The patient is Watcher - Medium risk of patient condition declining or worsening    Shift Goals  Clinical Goals: pain management, monitor labs, stable VS  Patient Goals: rest  Family Goals: understand POC, rest    Progress made toward(s) clinical / shift goals:  Patient has had stable vital signs, denies pain at this time, labs improving, PICC line established today, pt tolerated a full dinner and output has been adequate.

## 2022-06-01 NOTE — PROGRESS NOTES
[facility]     [id number]     International Phase 3 trial in [place] 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      This document note is to provide documentation regarding Tomas Jean-Baptiste's informed consent for participation on the [facility] treatment protocol [id number].      Consent Summary:    Patient Name: Tomas Jean-Baptiste    YOB: 2000    Date of Consent: 06/01/22    Protocol Discussed: [facility] [id number], Induction Therapy    Persons Present: Tomas Jean-Baptiste (patient), Pepe Faye (treating physician)    Patient/Parent Primary Language: English    Consent Signed:  Yes    Contraception: Proper contraception/abstinence during the entirety of the study was discussed and agreed to by the patient.    Questions: Patient/parent has had the opportunity to ask questions and all questions have been answered.    Patient provided copies of signed consent: Yes    Person Obtaining Consent: Pepe Faye MD Date: 6/1/2022    Consent conference took place on 6/1/2022 at 1200 in the patient's hospital room.  Parties present were patient himself as well as treating physician.  Consent for [facility] treatment protocol [id number] was discussed in detail.  The patient was informed as to why he was being invited to take part in the research study.  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  The biology of Ph-positive disease was discussed with the patient and it was discussed why this made him eligible for study on [id number].  It was discussed with the patient that the current standard of treatment for the disease was to treat with tyrosine kinase inhibitors (TKI) and at this time the TKI imatinib was discussed.  The patient was informed that the  study was being conducted for the purposes of determining if less toxic chemotherapy in combination with the TKI imatinib would effectively treat Ph-positive ALL comparing to the current standard of care with more toxic chemotherapy.  The study design was discussed in detail to include different phases of therapy as well as a risk assessment after Induction 1B.  The patient was informed that if he were to be considered high risk with positive MRD he would be given a standard of care consolidation and if end of 3 blocks of consolidation MRD was negative and a bone marrow transplant donor were identified he would proceed onto HSCT.  Also discussed were reasons for coming off of study.  The patient was informed that if his end of Induction MRD were favorable however that he would proceed to randomization either a lower toxicity regimen in combination with imatinib or a higher toxicity regimen in combination with imatinib.  Induction therapy was discussed in great detail to include discussion of each chemotherapeutic agent and its toxicities as well as benefits.  Also discussed was the TKI imatinib, its toxicities and benefits.  It was discussed that the majority of treatment in the protocol is standard for Ph+ positive ALL.  Research specific studies were discussed to include minimal residual disease.  Additional research to include biology studies and biobanking were discussed with Tomas who is already familiar with these topics after consenting to both [id number] and [id number].  The risks of the study were discussed in great detail, the benefits of the study were discussed in great detail as well as the alternatives and the risks and benefits of the alternatives.  Finally, study enrollment numbers, duration of study, financial risk as the right to refuse study or cancel study participation at any time.    Tomas was given an opportunity to ask all questions that he had and all questions were answered.     Tomas agreed  to participation on study.  He agreed to biology and bio banking studies in addition to participation with AALL 1631 treatment.  All documentation was copied and given to patient as well as provided to our clinical research office.        Pepe Faye MD  Pediatric Hematology / Oncology  [facility]   Direct Ph. [phone] / Cell. [phone]  [email or web address]

## 2022-06-01 NOTE — PROGRESS NOTES
"Dr. Rodriguez notified of pt's neuro assessment (difficult to arouse, one word answers, able to move all extremities, and pt stated \"I feel funny\"). No deficits noted such as: weakness to any side of body, numbness/tingling, arm drift, pupillary abnormalities. No interventions ordered by Dr. Rodriguez at this time other than to continue to monitor. Dr. Redmond also notified of acute change to neuro status. Dr. Redmond to bedside at this time to assess, Q1hr neuro assessments verbally ordered at this time.    "

## 2022-06-01 NOTE — PROGRESS NOTES
Dr. Rodriguez notified of blood noted around PICC insertion site (small amount). PICC patent and w/ good blood return. Repeat labs already at 0800, RN to change dressing if needed per policy.

## 2022-06-01 NOTE — PROGRESS NOTES
Pt demonstrates ability to turn self in bed without assistance of staff. Patient and family understands importance in prevention of skin breakdown, ulcers, and potential infection. Hourly rounding in effect. RN skin check complete.   Devices in place include: PICC x1, PIV x1, continuous monitoring devices, LFNC.  Skin assessed under devices: Yes.  Confirmed HAPI identified on the following date: N/A   Location of HAPI: N/A.  Wound Care RN following: No.  The following interventions are in place: Devices rotated with assessments, skin kept clean and dry, pt rotates self but repositioned as needed.

## 2022-06-01 NOTE — PROCEDURES
Vascular Access Team     Vasculature unable to support double lumen PICC, MD singer with single lumen PICC placement.     Date of Insertion: 5/31/2022  Arm Circumference: 32.5  Internal length: 36  External Length: 0  Vein Occupancy %: 45   Reason for PICC: Chemotherapy  Labs: WBC 85.5, PLT 76, BUN 9, Cr 0.66, , INR n/a     Consents confirmed, vessel patency confirmed with ultrasound. Risks and benefits of procedure explained to patient and education regarding central line associated bloodstream infections provided. Questions answered.      PICC placed in RUE per licensed provider order with ultrasound guidance.  4 Fr, 1 lumen PICC placed in Cephalic vein after 1 attempt(s). 2 mL of 1% lidocaine injected intradermally at the insertion site. A 21 gauge microintroducer needle was visualized entering the vein and modified Seldinger technique was used to obtain access to the vein. 36 cm catheter inserted and brisk blood return was observed from each lumen upon aspiration. Line secured at the 0 cm marker. TCS stylet removed and observed to be fully intact. Each lumen flushed using pulsatile method without resistance with 10 mL 0.9% normal saline. PICC line secured with Biopatch and Tegaderm.     PICC tip placement location is confirmed by nurse to be in the Superior Vena Cava (SVC) utilizing 3CG technology. PICC line is appropriate for use at this time. Patient tolerated procedure well, without complications.  Patient condition relayed to primary RN or ordering physician via this post procedure note in the EMR.      Ultrasound images uploaded to PACS and viewable in the EMR - yes  Ultrasound imaged printed and placed in paper chart - no     [employer] Power PICC ref # [id number], Lot # [id number], Expiration Date 06/30/2023

## 2022-06-01 NOTE — PROGRESS NOTES
Late entry for 5/31~    2100: Pt awake at this time, neuro status improving since 2000 assessment. Pt awoke to voice without need for noxious stimuli. Pt still fatigued with one word answers to questions, pt still following all commands with no noted deficits.     2200: Pt back to baseline by this time and able to have full conversation with this RN, fatigue still noted. No concerns for worsening neuro status at this time. Continuing to monitor at this time per unit policy.

## 2022-06-01 NOTE — PROGRESS NOTES
"Pediatric Critical Care Progress Note    Hasmukh Burns , PICU Attending  Date: 6/1/2022     Time: 12:13 PM        ASSESSMENT:     Tomas Roy \"JULY\" Estiven is a 21 y.o. 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 He is also being treated for fever with neutropenia, but has been afebrile >48 hours with no nidus of infection. He has been having blurry vision, but is improving and does not currently c/o duplicate vision. PICU is following as consult for the aforementioned potentially life-threatening conditions with pediatric hematology/oncology primary service for his malignancy.    Patient Active Problem List    Diagnosis Date Noted   • Pre B-cell acute lymphoblastic leukemia (ALL) ([facility]) 05/29/2022   • Neutropenic fever ([facility]) 05/28/2022   • Tachycardia with heart rate 100-120 beats per minute 05/28/2022   • Acute lymphoblastic leukemia in adult ([facility]) 05/27/2022   • PE (pulmonary thromboembolism) ([facility]) 05/27/2022   • Right leg pain 05/27/2022   • Back pain 05/27/2022   • Superficial vein thrombosis 05/27/2022   • Elevated blood uric acid level 05/27/2022   • Family history of diabetes mellitus 08/18/2020   • Callus of foot 08/18/2020   • Flat feet 04/03/2020   • Bilateral anterior knee pain 04/03/2020   • Bilateral ankle joint pain 04/03/2020   • Chronic midline thoracic back pain 04/03/2020     PLAN:     NEURO:   - Pain medication:   ---Tylenol 650 mg Q4 hrs PO PRN  ---Oxycodone 2.5 or 5 mg PO Q3 hrs prn moderate/severe pain  --- Dilaudid 0.25 mg IV Q3 hrs pRN severe pain/patient NPO  - Anxiolysis: Lorazepam 2 mg IV Q6 hrs prn      RESP: Maintain saturation in adequate range, monitor for distress.   - Provide oxygen as indicated  - " "Monitor for pulmonary edema given hyperhydration  - Monitor for expansion of pulmonary embolism    CV: Maintain normal hemodynamics.   - CRM monitoring indicated for any hypotension or dysrhythmia  - Hydralazine 10 mg IV Q 4 hrs prn SBP > 180 or DBP> 110     GI:   - Diet: Regular  - GERD prophylaxis:  Famotidine 20 mg PO BID  - Nausea:   --- First line: Zofran 8 mg IV Q8 HRS  --- Second line: Promethazine 25 mg PO Q 6 hrs prn  - Bowel regimen  ----Senna-Docusate 50 mg tab PO BID  --- Miralax 1 packet PO daily prn constipation  --- Magnesium hydroxide 30 mL PO daily prn constipation  --- Dulcolax suppository 10 mg MS daily prn constipation     FEN/Renal/Endo:  - Acute kidney injury: now resolved   - IVF: NS @ 125 mL/hr  - Reviewed electrolytes  - Hyperuricemia: due to tumor lysis, continue allopurinol     ID:   - Fever and neutropenia:  --- Febrile to 38.5C on 5/28/2022, Blood cultures drawn x2 - NGTD  --- per oncology, stop Cefepime   --- If febrile, re-start cefepime 2g IV q8h and re-culture  - TB prophylaxis: Bactrim 80 mg tab (2 tabs) PO BID  - Monitor clinical status        HEME/ONC:  - Precursor B-Cell Acute Lymphoblastic Leukemia  --- Continue Prednisone 60 mg PO BID  --- Will get   --- Patient enrolled ON STUDY [id number].     --- CNS and testicular status to be determined  --- S/p Hydroxyurea (received < 72 hours)   --- S/p Leukapharesis for hyperleukocytosis and risk for leukostasis  - 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: High tumor burden,-s/p Rasburicase x 1 dose  --- See \"hyperuricemia\" in FEN  --- TLS labs spaced from q8h to q12 hours (potassium, uric acid, phosphorus, ionized calcium)  - Pretreatment work-up as below:                          CXR - unremarkable                          ECHO - Evidence of delayed (>5 beat) right to left shunt suggestive of intra   pulmonary arterio-venous shunt or malformation.                          Ferritin - PENDING per oncology                          D-Dimer 1.05 on " "5/30/2022                          Fibrinogen 324 as of 5/29/2022                          PT/aPTT 16/36.5 as of 5/28/2022                          CMV, Varicella, HSV serologies completed 5/31/2022  --- S/p Bone marrow biopsy/aspirate, LP with intrathecal cytarabine and PICC line placement    - Hypercoagulability with RLE thrombosis with subsegmental pulmonary embolism:   --- continue apixaban  - Disease Related Pancytopenia:  --- Transfuse for Hgb < 7 or symptomatic  --- Transfuse for platelets < 10K or symptomatic (cautious transfusion while still with hyperleukocytosis)  --- Neutropenic Precautions, work-up all new fevers or signs/symptoms of infection     Access:   - Bilateral antecubital PIV  - PICC placed 5/31     #Social:  - Social Work Consultation  - Referral to [facility]     DISPO: Patient care and plans reviewed and directed with PICU team and Hem/Onc service.      SUBJECTIVE:     24 Hour Review  PICC placed yesterday without complication. Endorsed some blurry vision in PM, improved overnight and endorses continued improvement this AM. Denies double vision. Tolerating PO. Tumor lysis labs are reassuring. Switched to enteral anticoagulation medications.     Review of Systems: I have reviewed the patent's history and at least 10 organ systems and found them to be unchanged other than noted above      OBJECTIVE:     Vitals:   BP (!) 146/70   Pulse 86   Temp 36.5 °C (97.7 °F) (Oral)   Resp (!) 34   Ht 1.651 m (5' 5\")   Wt 77 kg (169 lb 12.1 oz)   SpO2 97%     PHYSICAL EXAM:   Gen:  Alert, no evidence of pain or distress, cooperative  HEENT: NCAT, PERRL, conjunctiva clear, nares clear, MMM  Cardio: HR 70s and sinus-appearing on monitor, pulses full and equal  Resp:  No increased work of breathing  GI:  Soft, ND/NT, NABS, no HSM  Neuro: CN exam intact, motor and sensory exam non-focal, no new deficits  Skin/Extremities: Cap refill < 3 sec, WWP, no rash, SUAREZ well, RLE tenderness to palpation "       Intake/Output Summary (Last 24 hours) at 6/1/2022 1213  Last data filed at 6/1/2022 0823  Gross per 24 hour   Intake 3635.42 ml   Output 4550 ml   Net -914.58 ml          CURRENT MEDICATIONS:    Current Facility-Administered Medications   Medication Dose Route Frequency Provider Last Rate Last Admin   • apixaban (ELIQUIS) tablet 10 mg  10 mg Oral BID River Rodriguez M.D.   10 mg at 06/01/22 0602    Followed by   • [START ON 6/7/2022] apixaban (ELIQUIS) tablet 5 mg  5 mg Oral BID River Rodriguez M.D.       • allopurinol (ZYLOPRIM) tablet 200 mg  200 mg Oral Q8HRS River Rodriguez M.D.   200 mg at 06/01/22 0555   • lidocaine-prilocaine (EMLA) 2.5-2.5 % cream 1 Application  1 Application Topical PRN Lia Hernandez D.O.   1 Application at 05/30/22 1324   • [START ON 6/4/2022] sulfamethoxazole-trimethoprim (BACTRIM) 400-80 MG per tablet 2 Tablet  2 Tablet Oral 2 times per day on Sun Sat Pepe Faye M.D.       • famotidine (PEPCID) tablet 20 mg  20 mg Oral BID Pepe Faye M.D.   20 mg at 06/01/22 0556   • ondansetron (ZOFRAN) syringe/vial injection 8 mg  8 mg Intravenous Q8HRS Pepe Faye M.D.   8 mg at 06/01/22 0555   • predniSONE (DELTASONE) tablet 60 mg  60 mg Oral BID Pepe Faye M.D.   60 mg at 06/01/22 0601   • LORazepam (ATIVAN) injection 2 mg  2 mg Intravenous Q6HRS PRN Pepe Faye M.D.   2 mg at 05/31/22 1529   • promethazine (PHENERGAN) tablet 25 mg  25 mg Oral Q6HRS PRN Pepe Faye M.D.       • ondansetron (ZOFRAN) syringe/vial injection 8 mg  8 mg Intravenous Q8HRS PRN Pepe Faye M.D.       • heparin lock flush 100 unit/mL injection 200 Units  200 Units Intravenous Q6HR JASS LanN.   200 Units at 06/01/22 0407   • NS infusion   Intravenous Continuous River Rodriguez M.D. 100 mL/hr at 06/01/22 1005 Rate Change at 06/01/22 1005   • acetaminophen (Tylenol) tablet 650 mg  650 mg Oral Q4HRS PRN Destini Jackson A.P.R.NReid   650 mg at 05/28/22 5012    • sodium chloride (OCEAN) 0.65 % nasal spray 2 Spray  2 Spray Nasal Q2HRS PRN EDWAR Mcclure       • senna-docusate (PERICOLACE or SENOKOT S) 8.6-50 MG per tablet 2 Tablet  2 Tablet Oral BID Margarito Hayward M.D.   2 Tablet at 06/01/22 0556    And   • polyethylene glycol/lytes (MIRALAX) PACKET 1 Packet  1 Packet Oral QDAY PRN Margarito Hayward M.D.   1 Packet at 06/01/22 0619    And   • magnesium hydroxide (MILK OF MAGNESIA) suspension 30 mL  30 mL Oral QDAY PRN Margarito Hayward M.D.        And   • bisacodyl (DULCOLAX) suppository 10 mg  10 mg Rectal QDAY PRN Margarito Hayward M.D.       • oxyCODONE immediate-release (ROXICODONE) tablet 2.5 mg  2.5 mg Oral Q3HRS PRRUTHIE Hayward M.D.   2.5 mg at 05/31/22 0023    Or   • oxyCODONE immediate-release (ROXICODONE) tablet 5 mg  5 mg Oral Q3HRS PRRUTHIE Hayward M.D.   5 mg at 05/29/22 0924    Or   • HYDROmorphone (Dilaudid) injection 0.25 mg  0.25 mg Intravenous Q3HRS PRRUTHIE Hayward M.D.       • hydrALAZINE (APRESOLINE) injection 10 mg  10 mg Intravenous Q4HRS PRRUTHIE Hayward M.D.             LABORATORY VALUES:  - Laboratory data reviewed.       RECENT /SIGNIFICANT DIAGNOSTICS:  - Radiographs reviewed (see official reports)    This is a critically ill patient for whom I have provided critical care services which include high complexity assessment and management necessary to support vital organ system function.    Noncontinuous critical care time spent:  50 min.  Includes bedside evaluation, evaluation of medical data, discussion(s) with healthcare team and discussion(s) with the family.    The above note was signed by:  Hasmukh Burns M.D., Pediatric Attending   Date: 6/1/2022     Time: 12:13 PM

## 2022-06-01 NOTE — PROGRESS NOTES
"Pediatric Hematology/Oncology  Daily Progress Note      Patient Name:  Tomas Jean-Baptiste  : 2000  MRN: [id number]    Location of Service: PICU (floor status)  Date of Service: 2022  Time: 10:06 AM    Hospital Day: 6    Patient Active Problem List   Diagnosis   • Flat feet   • Bilateral anterior knee pain   • Bilateral ankle joint pain   • Chronic midline thoracic back pain   • Family history of diabetes mellitus   • Callus of foot   • Acute lymphoblastic leukemia in adult (HCC)   • PE (pulmonary thromboembolism) ([facility])   • Right leg pain   • Back pain   • Superficial vein thrombosis   • Elevated blood uric acid level   • Neutropenic fever ([facility])   • Tachycardia with heart rate 100-120 beats per minute   • Pre B-cell acute lymphoblastic leukemia (ALL) ([facility])       SUBJECTIVE:   Generally better: less leg pain, appears to be weaning off supplemental oxygen.    Review of Systems:     Constitutional: Afebrile, \"OK\" appetite.  HENT: Mild nosebleed this afternoon; no congestion, rhinorrhea, sore throat, mouth sores.  Eyes: Blurry vision persists.  Respiratory: Negative for shortness of breath or cough.   Cardiovascular: Negative for chest pain and leg swelling.    Gastrointestinal: Negative for nausea, vomiting, abdominal pain, diarrhea, constipation.    Skin: Negative for rash or skin infection..  Neurological: Negative for numbness, tingling, sensory changes, weakness or headaches.      Psychiatric/Behavioral: \"I'm good\"     Medications:    Current Facility-Administered Medications:   •  apixaban (ELIQUIS) tablet 10 mg, 10 mg, Oral, BID, 10 mg at 22 0602 **FOLLOWED BY** [START ON 2022] apixaban (ELIQUIS) tablet 5 mg, 5 mg, Oral, BID, River Rodriguez M.D.  •  allopurinol (ZYLOPRIM) tablet 200 mg, 200 mg, Oral, Q8HRS, River Rodriguez M.D., 200 mg at 22 0555  •  lidocaine-prilocaine (EMLA) 2.5-2.5 % cream 1 Application, 1 Application, Topical, PRN, Lia Hernandez D.O., 1 " Application at 05/30/22 1324  •  [START ON 6/4/2022] sulfamethoxazole-trimethoprim (BACTRIM) 400-80 MG per tablet 2 Tablet, 2 Tablet, Oral, 2 times per day on Sun Sat, Pepe Faye M.D.  •  famotidine (PEPCID) tablet 20 mg, 20 mg, Oral, BID, Pepe Faye M.D., 20 mg at 06/01/22 0556  •  ondansetron (ZOFRAN) syringe/vial injection 8 mg, 8 mg, Intravenous, Q8HRS, Pepe Faye M.D., 8 mg at 06/01/22 0555  •  predniSONE (DELTASONE) tablet 60 mg, 60 mg, Oral, BID, Pepe Faye M.D., 60 mg at 06/01/22 0601  •  LORazepam (ATIVAN) injection 2 mg, 2 mg, Intravenous, Q6HRS PRN, Pepe Faye M.D., 2 mg at 05/31/22 1529  •  promethazine (PHENERGAN) tablet 25 mg, 25 mg, Oral, Q6HRS PRN, Pepe Faye M.D.  •  ondansetron (ZOFRAN) syringe/vial injection 8 mg, 8 mg, Intravenous, Q8HRS PRN, Pepe Faye M.D.  •  heparin lock flush 100 unit/mL injection 200 Units, 200 Units, Intravenous, Q6HR, AMBROSIO Lan.P.NReid, 200 Units at 06/01/22 0407  •  NS infusion, , Intravenous, Continuous, River Rodriguez M.D., Last Rate: 125 mL/hr at 06/01/22 0729, Rate Verify at 06/01/22 0729  •  acetaminophen (Tylenol) tablet 650 mg, 650 mg, Oral, Q4HRS PRN, Destini Jackson A.P.R.N., 650 mg at 05/28/22 1825  •  sodium chloride (OCEAN) 0.65 % nasal spray 2 Spray, 2 Spray, Nasal, Q2HRS PRN, Destini Jackson A.P.R.N.  •  senna-docusate (PERICOLACE or SENOKOT S) 8.6-50 MG per tablet 2 Tablet, 2 Tablet, Oral, BID, 2 Tablet at 06/01/22 0556 **AND** polyethylene glycol/lytes (MIRALAX) PACKET 1 Packet, 1 Packet, Oral, QDAY PRN, 1 Packet at 06/01/22 0619 **AND** magnesium hydroxide (MILK OF MAGNESIA) suspension 30 mL, 30 mL, Oral, QDAY PRN **AND** bisacodyl (DULCOLAX) suppository 10 mg, 10 mg, Rectal, QDAY PRN, Margarito Hayward M.D.  •  oxyCODONE immediate-release (ROXICODONE) tablet 2.5 mg, 2.5 mg, Oral, Q3HRS PRN, 2.5 mg at 05/31/22 0023 **OR** oxyCODONE immediate-release (ROXICODONE) tablet 5 mg, 5 mg, Oral, Q3HRS PRN, 5 mg at  "22 0924 **OR** HYDROmorphone (Dilaudid) injection 0.25 mg, 0.25 mg, Intravenous, Q3HRS PRN, Margarito Hayward M.D.  •  hydrALAZINE (APRESOLINE) injection 10 mg, 10 mg, Intravenous, Q4HRS PRN, Margarito Hayward M.D.    OBJECTIVE:     Max Temp: Temp (24hrs), Av.4 °C (97.5 °F), Min:36.1 °C (96.9 °F), Max:36.8 °C (98.3 °F)      Vitals: /63   Pulse 85   Temp 36.5 °C (97.7 °F) (Oral)   Resp (!) 23   Ht 1.651 m (5' 5\")   Wt 77 kg (169 lb 12.1 oz)   SpO2 97%   BMI 28.25 kg/m²       Intake/Output Summary (Last 24 hours) at 2022 1006  Last data filed at 2022 0823  Gross per 24 hour   Intake 3906.71 ml   Output 5250 ml   Net -1343.29 ml       Today's weight is 75.2 kg, down 1.8 kg from yesterday.    Labs:   Latest Reference Range & Units 22 07:50   WBC 4.8 - 10.8 K/uL 18.5 (H)   Hemoglobin 14.0 - 18.0 g/dL 8.6 (L)   Hematocrit 42.0 - 52.0 % 25.8 (L)   Platelet Count 164 - 446 K/uL 44 (L)   Neutrophils-Polys 44.00 - 72.00 % 2.60 (L)   Neutrophils (Absolute) 1.82 - 7.42 K/uL 0.63 (L) [1]   Bands-Stabs 0.00 - 10.00 % 0.80   Lymphocytes 22.00 - 41.00 % 20.50 (L)   Blasts % 76.10 (HH)   Sodium 135 - 145 mmol/L 138   Potassium 3.6 - 5.5 mmol/L 4.2   Chloride 96 - 112 mmol/L 104   Co2 20 - 33 mmol/L 26   Anion Gap 7.0 - 16.0  8.0   Glucose 65 - 99 mg/dL 193 (H)   Bun 8 - 22 mg/dL 16   Creatinine 0.50 - 1.40 mg/dL 0.65   GFR (CKD-EPI) >60 mL/min/1.73 m 2 138 [3]   Calcium 8.5 - 10.5 mg/dL 7.7 (L)   Phosphorus 2.5 - 4.5 mg/dL 3.4   Uric Acid 2.5 - 8.3 mg/dL 2.9   Fibrinogen 215 - 460 mg/dL 362       Physical Exam:    Constitutional: Very pleasant and talkative   HENT: Normocephalic and atraumatic. No rhinorrhea. Oropharynx is clear and moist.   Eyes: Conjunctivae are normal. No scleral icterus.   Neck: Supple, no adenopathy.    Cardiovascular: RRR w/o murmur.  Pulmonary/Chest: Effort normal. Symmetric expansion.  Clear to auscultation bilaterally.  Abdomen: Soft. Bowel sounds are normal. No distension, " organomegaly or mass.      Skin: Skin is warm, dry and pink.  No rash or evidence of skin infection.       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     1) Precursor B-Cell Acute Lymphoblastic Leukemia:              - 2-3 weeks of symptoms              - Presenting WBC > 440 k/uL, hyperleukocytosis              - Start of Hydroxyurea (1500 mg PO Q8) 2320 on 5/27/2022  - discontinued on May 30 (55 hours of hydroxyurea < 72h allowed)              - Leukapharesis for hyperleukocytosis and risk for leukostasis - pheresed yesterday AM - post pharesis WBC count 71.3 k/uL              - No steroid pretreatment              - Peripheral flow cytometry remarkable for very large population of CD10 pos, CD19 pos, CD 20 negative lymphoblasts (communicated from Dr. Darden - Hematopathology 5/29/2022)               - CNS status PENDING (1 WBC, 3 RBC - awaiting pathology review)              - Testicular status NEGATIVE                     - Meets with all criteria for [id number]              - [id number] offered to patient.  Risks and benefits of study discussed in detail (see separate consent notes).  All questions answered.  Consent for [id number] signed.  Patient enrolled ON STUDY [id number].  Biobanking samples to be obtained at time of bone marrow biopsy and aspirate today                 - Given the following 1) Enrollment on [id number] 2) Patient meeting with all criteria for enrollment on COG [id number] 3) and Patient not meeting with any exclusion criteria for enrollment on COG [id number],  treatment ON STUDY COG [id number] offered to patient.  The risks, benefits, alternatives and risks and benefits of the alternatives were discussed with the patient and his mother.  Patient HR by age alone.  The patient and his mother were given an opportunity to ask questions and all questions were answered before Tomas signed  consent for enrollment ON STUDY [id number].                - New finding of Ph1 positivity makes JULY eligible for [id number]. Dr Faye has reviewed this protocol with JULY, who has signed consent and is now enrolled on that protocol.                             - Pretreatment work-up as below:                          CBC: WBC 72.3, Hgb 6.5, platelets 53K (ANC 3250, Abs Peripheral Blast Count 52,923) (just prior to BM/study labs 5/30/2022)                          CMP:  AST 41, ALT 22, total bili 0.3, direct bili <0.2                          CXR - unremarkable                          ECHO - EF 75% (will have ECHO reviewed again for measurement of SF)                          Ferritin PENDING                          D-Dimer elevated at 1.05 5/30/2022                          Fibrinogen 324 as of 5/29/2022                          PT/aPTT 16/36.5 as of 5/28/2022                          CMV, Varicella, HSV serologies PENDING                                        High Risk Acute B-Lymphoblastic Leukemia, as per [id number], Induction, Day 3                          ** Cytarabine 70 mg IT x1 dose on Day 1 (see separate procedure note)                          ** Vincristine 2 mg IV on Days 1 (COMPLETE), 8, 15 and 22                          ** Daunorubicin 47.5 mg IV on Days 1 (COMPLETE), 8, 15 and 22                          ** PEG Aspargase 2500 IU/m2/dose on Day 4                          ** Prednisone 60 mg PO BID, Day 3 of 28                          ** Methotrexate IT on Days 8 and 29     2) Disease-related Pancytopenia:              - Worsening anemia with hemoglobin 6.5 g/dL: pRBCs transfused yesterday              - Thrombocytopenia with platelet count 76k after tx yesterday              - Transfuse for Hgb < 7 or symptomatic              - Transfuse for platelets < 10K or symptomatic (cautious transfusion while still with hyperleukocytosis)              - Neutropenic Precautions                 3) Fever and  "Neutropenia:              - Febrile to 38.5C on 5/28/2022 at 0945 -no fever since              - Blood cultures drawn x2 - NGTD              - D/Terrence cefepime this morning.              - ANC 3250 but functionally neutropenic              - Monitor clinical status, can broaden coverage if medically indicated     4) 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:              - On presentation Uric Acid 15.6, LDH 1114              - High tumor burden              - S/P Rasburicase x 1 dose              - Switching today to allopurinol 200 mg po tid               - Hyperhydration of NS at 125 mL/h -some evidence of fluid overload, monitor fluid status closely              - TLS labs Q8 hours (potassium, uric acid, phosphorus, ionized calcium)              - Potassium 5.0, ionized calcium 1.1, uric acid 3.0, phosphorus 2.5              - Lab frequency decreased     5) Hypercoagulability / Superficial Right LE Thrombus / Subsegmental PE:              - Anticoagulation held for lumbar puncture yesterday              - Restarted heparin drip following procedure targeting therapeutic anti-Xa              - Platelets transfused allow for hemostasis during procedure, 53K prior to procedure              - Transitioned to apixaban yesterday for anticoagulation through induction, likely continue to complete a 3-monthcourse    6) Blurry vision:   - Possibly related to fluids/prednisone   - JULY had previously stated that his eyeglasses might be \"bent,\" but he has a different pair today and the vision is still not normal   - Will consider ophthalmology consult if no improvement     7) Access:               - Right-sided PICC line placed yesterday     Disposition:  Inpatient for treatment of Precursor B-Cell Acute Lymphoblastic Leukemia.  Will continue standard 4-drug induction as per [id number] but planning switch to [id number] on Day 15 (adding imatinib).    Discussed with nursing staff, Dr. Faye.  Total time today approx 50 minutes.    ANN MARIE Butler" MD Jennifer  Pediatric Hematology / Oncology  [facility]  Cell. [phone]  Office. [phone]

## 2022-06-01 NOTE — PROGRESS NOTES
Brooks from Lab called with critical result of WBC 58.3 at 1756. Critical lab result read back to Brooks.   Dr. Rodriguez notified of critical lab result at 1814.  Critical lab result read back by Dr. Rodriguez.

## 2022-06-02 ENCOUNTER — APPOINTMENT (OUTPATIENT)
Dept: ONCOLOGY | Facility: MEDICAL CENTER | Age: 22
End: 2022-06-02
Attending: PEDIATRICS
Payer: COMMERCIAL

## 2022-06-02 DIAGNOSIS — C91.00 ACUTE LYMPHOBLASTIC LEUKEMIA IN ADULT (HCC): ICD-10-CM

## 2022-06-02 PROBLEM — R00.0 TACHYCARDIA WITH HEART RATE 100-120 BEATS PER MINUTE: Status: RESOLVED | Noted: 2022-05-28 | Resolved: 2022-06-02

## 2022-06-02 PROBLEM — E79.0 ELEVATED BLOOD URIC ACID LEVEL: Status: RESOLVED | Noted: 2022-05-27 | Resolved: 2022-06-02

## 2022-06-02 PROBLEM — M79.604 RIGHT LEG PAIN: Status: RESOLVED | Noted: 2022-05-27 | Resolved: 2022-06-02

## 2022-06-02 PROBLEM — M54.9 BACK PAIN: Status: RESOLVED | Noted: 2022-05-27 | Resolved: 2022-06-02

## 2022-06-02 PROBLEM — D70.9 NEUTROPENIC FEVER (HCC): Status: RESOLVED | Noted: 2022-05-28 | Resolved: 2022-06-02

## 2022-06-02 PROBLEM — R04.0 EPISTAXIS, RECURRENT: Status: ACTIVE | Noted: 2022-06-02

## 2022-06-02 PROBLEM — R50.81 NEUTROPENIC FEVER (HCC): Status: RESOLVED | Noted: 2022-05-28 | Resolved: 2022-06-02

## 2022-06-02 LAB
ANION GAP SERPL CALC-SCNC: 10 MMOL/L (ref 7–16)
ANION GAP SERPL CALC-SCNC: 10 MMOL/L (ref 7–16)
ANISOCYTOSIS BLD QL SMEAR: ABNORMAL
BACTERIA BLD CULT: NORMAL
BACTERIA BLD CULT: NORMAL
BARCODED ABORH UBTYP: 5100
BARCODED PRD CODE UBPRD: NORMAL
BARCODED UNIT NUM UBUNT: NORMAL
BASOPHILS # BLD AUTO: 0 % (ref 0–1.8)
BASOPHILS # BLD: 0 K/UL (ref 0–0.12)
BLASTS NFR BLD MANUAL: 61.1 %
BUN SERPL-MCNC: 16 MG/DL (ref 8–22)
BUN SERPL-MCNC: 16 MG/DL (ref 8–22)
CALCIUM SERPL-MCNC: 8.1 MG/DL (ref 8.5–10.5)
CALCIUM SERPL-MCNC: 8.1 MG/DL (ref 8.5–10.5)
CHLORIDE SERPL-SCNC: 102 MMOL/L (ref 96–112)
CHLORIDE SERPL-SCNC: 104 MMOL/L (ref 96–112)
CHROM ANALY INTERPHASE BLD/MAR FISH-IMP: NORMAL
CHROM ANALY INTERPHASE BLD/MAR FISH-IMP: NORMAL
CO2 SERPL-SCNC: 24 MMOL/L (ref 20–33)
CO2 SERPL-SCNC: 26 MMOL/L (ref 20–33)
COMPONENT P 8504P: NORMAL
CREAT SERPL-MCNC: 0.56 MG/DL (ref 0.5–1.4)
CREAT SERPL-MCNC: 0.58 MG/DL (ref 0.5–1.4)
EOSINOPHIL # BLD AUTO: 0 K/UL (ref 0–0.51)
EOSINOPHIL NFR BLD: 0 % (ref 0–6.9)
ERYTHROCYTE [DISTWIDTH] IN BLOOD BY AUTOMATED COUNT: 51.3 FL (ref 35.9–50)
GFR SERPLBLD CREATININE-BSD FMLA CKD-EPI: 143 ML/MIN/1.73 M 2
GFR SERPLBLD CREATININE-BSD FMLA CKD-EPI: 144 ML/MIN/1.73 M 2
GLUCOSE SERPL-MCNC: 153 MG/DL (ref 65–99)
GLUCOSE SERPL-MCNC: 174 MG/DL (ref 65–99)
HCT VFR BLD AUTO: 25.1 % (ref 42–52)
HGB BLD-MCNC: 8.3 G/DL (ref 14–18)
HSV1 GG IGG SER-ACNC: 39.4 IV
HSV2 GG IGG SER-ACNC: 0.08 IV
LYMPHOCYTES # BLD AUTO: 1.02 K/UL (ref 1–4.8)
LYMPHOCYTES NFR BLD: 24.8 % (ref 22–41)
MANUAL DIFF BLD: ABNORMAL
MCH RBC QN AUTO: 30 PG (ref 27–33)
MCHC RBC AUTO-ENTMCNC: 33.1 G/DL (ref 33.7–35.3)
MCV RBC AUTO: 90.6 FL (ref 81.4–97.8)
MICROCYTES BLD QL SMEAR: ABNORMAL
MONOCYTES # BLD AUTO: 0 K/UL (ref 0–0.85)
MONOCYTES NFR BLD AUTO: 0 % (ref 0–13.4)
MORPHOLOGY BLD-IMP: NORMAL
NEUTROPHILS # BLD AUTO: 0.58 K/UL (ref 1.82–7.42)
NEUTROPHILS NFR BLD: 14.1 % (ref 44–72)
NRBC # BLD AUTO: 0 K/UL
NRBC BLD-RTO: 0 /100 WBC
PATHOLOGY STUDY: NORMAL
PATHOLOGY STUDY: NORMAL
PHOSPHATE SERPL-MCNC: 3 MG/DL (ref 2.5–4.5)
PHOSPHATE SERPL-MCNC: 3.9 MG/DL (ref 2.5–4.5)
PLATELET # BLD AUTO: 35 K/UL (ref 164–446)
PLATELET BLD QL SMEAR: NORMAL
PMV BLD AUTO: 9.4 FL (ref 9–12.9)
POTASSIUM SERPL-SCNC: 3.8 MMOL/L (ref 3.6–5.5)
POTASSIUM SERPL-SCNC: 4.3 MMOL/L (ref 3.6–5.5)
PRODUCT TYPE UPROD: NORMAL
RBC # BLD AUTO: 2.77 M/UL (ref 4.7–6.1)
RBC BLD AUTO: PRESENT
SIGNIFICANT IND 70042: NORMAL
SIGNIFICANT IND 70042: NORMAL
SITE SITE: NORMAL
SITE SITE: NORMAL
SODIUM SERPL-SCNC: 138 MMOL/L (ref 135–145)
SODIUM SERPL-SCNC: 138 MMOL/L (ref 135–145)
SOURCE SOURCE: NORMAL
SOURCE SOURCE: NORMAL
UNIT STATUS USTAT: NORMAL
URATE SERPL-MCNC: 2.2 MG/DL (ref 2.5–8.3)
URATE SERPL-MCNC: 2.3 MG/DL (ref 2.5–8.3)
WBC # BLD AUTO: 4.1 K/UL (ref 4.8–10.8)

## 2022-06-02 PROCEDURE — A9270 NON-COVERED ITEM OR SERVICE: HCPCS | Performed by: PEDIATRICS

## 2022-06-02 PROCEDURE — 86945 BLOOD PRODUCT/IRRADIATION: CPT

## 2022-06-02 PROCEDURE — P9034 PLATELETS, PHERESIS: HCPCS

## 2022-06-02 PROCEDURE — 97163 PT EVAL HIGH COMPLEX 45 MIN: CPT

## 2022-06-02 PROCEDURE — 85007 BL SMEAR W/DIFF WBC COUNT: CPT

## 2022-06-02 PROCEDURE — A9270 NON-COVERED ITEM OR SERVICE: HCPCS | Performed by: INTERNAL MEDICINE

## 2022-06-02 PROCEDURE — 85025 COMPLETE CBC W/AUTO DIFF WBC: CPT

## 2022-06-02 PROCEDURE — 36430 TRANSFUSION BLD/BLD COMPNT: CPT

## 2022-06-02 PROCEDURE — 700102 HCHG RX REV CODE 250 W/ 637 OVERRIDE(OP): Performed by: PEDIATRICS

## 2022-06-02 PROCEDURE — 84550 ASSAY OF BLOOD/URIC ACID: CPT

## 2022-06-02 PROCEDURE — 700105 HCHG RX REV CODE 258: Performed by: PEDIATRICS

## 2022-06-02 PROCEDURE — 700111 HCHG RX REV CODE 636 W/ 250 OVERRIDE (IP): Performed by: PEDIATRICS

## 2022-06-02 PROCEDURE — 84100 ASSAY OF PHOSPHORUS: CPT | Mod: 91

## 2022-06-02 PROCEDURE — 770001 HCHG ROOM/CARE - MED/SURG/GYN PRIV*

## 2022-06-02 PROCEDURE — 80048 BASIC METABOLIC PNL TOTAL CA: CPT

## 2022-06-02 PROCEDURE — A9270 NON-COVERED ITEM OR SERVICE: HCPCS | Performed by: NURSE PRACTITIONER

## 2022-06-02 PROCEDURE — 99233 SBSQ HOSP IP/OBS HIGH 50: CPT | Performed by: PEDIATRICS

## 2022-06-02 PROCEDURE — 700102 HCHG RX REV CODE 250 W/ 637 OVERRIDE(OP): Performed by: NURSE PRACTITIONER

## 2022-06-02 PROCEDURE — 700111 HCHG RX REV CODE 636 W/ 250 OVERRIDE (IP): Performed by: NURSE PRACTITIONER

## 2022-06-02 PROCEDURE — 700102 HCHG RX REV CODE 250 W/ 637 OVERRIDE(OP): Performed by: INTERNAL MEDICINE

## 2022-06-02 RX ORDER — SODIUM CHLORIDE 9 MG/ML
500 INJECTION, SOLUTION INTRAVENOUS CONTINUOUS
Status: CANCELLED | OUTPATIENT
Start: 2022-06-04

## 2022-06-02 RX ORDER — DIPHENHYDRAMINE HYDROCHLORIDE 50 MG/ML
50 INJECTION INTRAMUSCULAR; INTRAVENOUS ONCE
Status: CANCELLED | OUTPATIENT
Start: 2022-06-04 | End: 2022-06-03

## 2022-06-02 RX ORDER — PSEUDOEPHEDRINE HYDROCHLORIDE 60 MG/1
60 TABLET, FILM COATED ORAL EVERY 6 HOURS PRN
Status: DISCONTINUED | OUTPATIENT
Start: 2022-06-02 | End: 2022-06-04 | Stop reason: HOSPADM

## 2022-06-02 RX ORDER — EPINEPHRINE 1 MG/ML(1)
0.5 AMPUL (ML) INJECTION PRN
Status: CANCELLED | OUTPATIENT
Start: 2022-06-04

## 2022-06-02 RX ORDER — LIDOCAINE AND PRILOCAINE 25; 25 MG/G; MG/G
CREAM TOPICAL PRN
Status: CANCELLED | OUTPATIENT
Start: 2022-06-04

## 2022-06-02 RX ADMIN — SENNOSIDES AND DOCUSATE SODIUM 2 TABLET: 50; 8.6 TABLET ORAL at 06:04

## 2022-06-02 RX ADMIN — HEPARIN 200 UNITS: 100 SYRINGE at 21:51

## 2022-06-02 RX ADMIN — ACETAMINOPHEN 650 MG: 325 TABLET ORAL at 08:39

## 2022-06-02 RX ADMIN — ALLOPURINOL 200 MG: 100 TABLET ORAL at 21:51

## 2022-06-02 RX ADMIN — APIXABAN 10 MG: 5 TABLET, FILM COATED ORAL at 18:05

## 2022-06-02 RX ADMIN — PREDNISONE 60 MG: 10 TABLET ORAL at 18:05

## 2022-06-02 RX ADMIN — ALLOPURINOL 200 MG: 100 TABLET ORAL at 06:04

## 2022-06-02 RX ADMIN — SODIUM CHLORIDE: 9 INJECTION, SOLUTION INTRAVENOUS at 20:00

## 2022-06-02 RX ADMIN — APIXABAN 10 MG: 5 TABLET, FILM COATED ORAL at 06:05

## 2022-06-02 RX ADMIN — FAMOTIDINE 20 MG: 20 TABLET, FILM COATED ORAL at 18:06

## 2022-06-02 RX ADMIN — FAMOTIDINE 20 MG: 20 TABLET, FILM COATED ORAL at 06:04

## 2022-06-02 RX ADMIN — PREDNISONE 60 MG: 10 TABLET ORAL at 06:04

## 2022-06-02 RX ADMIN — PSEUDOEPHEDRINE HYDROCHLORIDE 60 MG: 60 TABLET ORAL at 18:06

## 2022-06-02 RX ADMIN — SODIUM CHLORIDE: 9 INJECTION, SOLUTION INTRAVENOUS at 06:05

## 2022-06-02 RX ADMIN — ALLOPURINOL 200 MG: 100 TABLET ORAL at 15:57

## 2022-06-02 ASSESSMENT — GAIT ASSESSMENTS
GAIT LEVEL OF ASSIST: STANDBY ASSIST
DISTANCE (FEET): 20
DEVIATION: DECREASED TOE OFF;DECREASED HEEL STRIKE;SHUFFLED GAIT

## 2022-06-02 ASSESSMENT — PAIN DESCRIPTION - PAIN TYPE
TYPE: ACUTE PAIN

## 2022-06-02 ASSESSMENT — COGNITIVE AND FUNCTIONAL STATUS - GENERAL
SUGGESTED CMS G CODE MODIFIER MOBILITY: CJ
STANDING UP FROM CHAIR USING ARMS: A LITTLE
WALKING IN HOSPITAL ROOM: A LITTLE
CLIMB 3 TO 5 STEPS WITH RAILING: A LITTLE
MOBILITY SCORE: 20
MOVING FROM LYING ON BACK TO SITTING ON SIDE OF FLAT BED: A LITTLE

## 2022-06-02 NOTE — PROGRESS NOTES
1500: Pt experiencing double and blurred vision, along with muffled hearing.     Balance and stability worse since this mornings assessment when up to the restroom. PT at bedside and agreeable with assessment.     MD to bedside for assessment.

## 2022-06-02 NOTE — CARE PLAN
Problem: Knowledge Deficit - Standard  Goal: Patient and family/care givers will demonstrate understanding of plan of care, disease process/condition, diagnostic tests and medications  Outcome: Progressing     Problem: Pain - Standard  Goal: Alleviation of pain or a reduction in pain to the patient’s comfort goal  Outcome: Progressing     Problem: Respiratory  Goal: Patient will achieve/maintain optimum respiratory ventilation and gas exchange  Outcome: Progressing   The patient is Watcher - Medium risk of patient condition declining or worsening    Shift Goals  Clinical Goals: Remain afebrile,provide emotional support, tolerate PO intake, have adequate output, stable vital signs  Patient Goals: Rest, stay updated on plan of care, keep tolerating drinking and eating regular diet  Family Goals: Update on plan of care    Progress made toward(s) clinical / shift goals:  Patient has remained afebrile, emotional support provided, has tolerated PO intake especially with fluids, vitals have been stable. Patient is now on room air with oxygen saturation WDL.

## 2022-06-02 NOTE — CARE PLAN
The patient is Watcher - Medium risk of patient condition declining or worsening    Shift Goals  Clinical Goals: Decrease Discomfort; Optho Consult  Patient Goals: Update on Plan of Care  Family Goals: NA- No family at [place]    Progress made toward(s) clinical / shift goals:  NA    Patient is not progressing towards the following goals:      Problem: Discharge Barriers/Planning  Goal: Patient's continuum of care needs are met  Outcome: Not Progressing  Note: Pt having abnormal neuro examination with double and blurred vision, altered gait and balance instability with the addition of now muffled hearing. MD aware.      Problem: Bowel Elimination  Goal: Establish and maintain regular bowel function  Outcome: Not Progressing  Note: Pt experiencing loose BMs. Bowel regimen held.

## 2022-06-02 NOTE — PROGRESS NOTES
"Pediatric Hematology/Oncology  Daily Progress Note      Patient Name:  Tomas Jean-Baptiste  : 2000  MRN: [id number]    Location of Service:  PICU (floor status)  Date of Service: 2022  Time: 10:32 AM    Hospital Day: 7    Patient Active Problem List   Diagnosis   • Flat feet   • Bilateral anterior knee pain   • Bilateral ankle joint pain   • Chronic midline thoracic back pain   • Family history of diabetes mellitus   • Callus of foot   • Acute lymphoblastic leukemia in adult ([facility])   • PE (pulmonary thromboembolism) ([facility])   • Superficial vein thrombosis   • Pre B-cell acute lymphoblastic leukemia (ALL) ([facility])   • Epistaxis, recurrent       SUBJECTIVE:   JULY is still c/o burry vision and, at times, diplopia.  This morning he also c/o headache (resolved after Tylenol) and \"muffled\" hearing (resolved spontaneously).    Review of Systems:     Constitutional: Afebrile, \"some\" appetite.  HENT: Brief nosebleed this morning (again); negative for ear pain, congestion, rhinorrhea, sore throat, mouth sores.  Respiratory: Negative for shortness of breath or cough.   Cardiovascular: Negative for chest pain.    Gastrointestinal: Negative for nausea, vomiting, abdominal pain, diarrhea, constipation.      Skin: Negative for rash or skin infection..  Neurological: Negative for numbness, tingling, sensory changes, focal weakness.    Endo/Heme/Allergies: No bruising/bleeding easily.      Medications:    Current Facility-Administered Medications:   •  apixaban (ELIQUIS) tablet 10 mg, 10 mg, Oral, BID, 10 mg at 22 0605 **FOLLOWED BY** [START ON 2022] apixaban (ELIQUIS) tablet 5 mg, 5 mg, Oral, BID, River Rodriguez M.D.  •  allopurinol (ZYLOPRIM) tablet 200 mg, 200 mg, Oral, Q8HRS, River Rodriguez M.D., 200 mg at 22 0604  •  lidocaine-prilocaine (EMLA) 2.5-2.5 % cream 1 Application, 1 Application, Topical, PRN, KRISTA Espinoza.O., 1 Application at 22 1324  •  [START ON 2022] " sulfamethoxazole-trimethoprim (BACTRIM) 400-80 MG per tablet 2 Tablet, 2 Tablet, Oral, 2 times per day on Sun Sat, Pepe Faye M.D.  •  famotidine (PEPCID) tablet 20 mg, 20 mg, Oral, BID, Pepe Faye M.D., 20 mg at 06/02/22 0604  •  predniSONE (DELTASONE) tablet 60 mg, 60 mg, Oral, BID, Pepe Faye M.D., 60 mg at 06/02/22 0604  •  LORazepam (ATIVAN) injection 2 mg, 2 mg, Intravenous, Q6HRS PRN, Pepe Faye M.D., 2 mg at 05/31/22 1529  •  promethazine (PHENERGAN) tablet 25 mg, 25 mg, Oral, Q6HRS PRN, Pepe Faye M.D.  •  ondansetron (ZOFRAN) syringe/vial injection 8 mg, 8 mg, Intravenous, Q8HRS PRN, Pepe Faye M.D.  •  heparin lock flush 100 unit/mL injection 200 Units, 200 Units, Intravenous, Q6HR, Mati Wu A.P.N., 200 Units at 06/01/22 0407  •  NS infusion, , Intravenous, Continuous, River Rodriguez M.D., Last Rate: 100 mL/hr at 06/02/22 0709, Rate Verify at 06/02/22 0709  •  acetaminophen (Tylenol) tablet 650 mg, 650 mg, Oral, Q4HRS PRN, Destini Jackson A.P.R.N., 650 mg at 06/02/22 0839  •  sodium chloride (OCEAN) 0.65 % nasal spray 2 Spray, 2 Spray, Nasal, Q2HRS PRN, AMBROSIO Mcclure.P.R.N.  •  senna-docusate (PERICOLACE or SENOKOT S) 8.6-50 MG per tablet 2 Tablet, 2 Tablet, Oral, BID, 2 Tablet at 06/02/22 0604 **AND** polyethylene glycol/lytes (MIRALAX) PACKET 1 Packet, 1 Packet, Oral, QDAY PRN, 1 Packet at 06/01/22 0619 **AND** magnesium hydroxide (MILK OF MAGNESIA) suspension 30 mL, 30 mL, Oral, QDAY PRN **AND** bisacodyl (DULCOLAX) suppository 10 mg, 10 mg, Rectal, QDAY PRN, Margarito Hayward M.D.  •  oxyCODONE immediate-release (ROXICODONE) tablet 2.5 mg, 2.5 mg, Oral, Q3HRS PRN, 2.5 mg at 05/31/22 0023 **OR** oxyCODONE immediate-release (ROXICODONE) tablet 5 mg, 5 mg, Oral, Q3HRS PRN, 5 mg at 05/29/22 0924 **OR** HYDROmorphone (Dilaudid) injection 0.25 mg, 0.25 mg, Intravenous, Q3HRS PRN, Margarito Hayward M.D.  •  hydrALAZINE (APRESOLINE) injection 10 mg, 10 mg,  "Intravenous, Q4HRS PRN, Margarito Hayward M.D.    OBJECTIVE:     Max Temp: Temp (24hrs), Av.2 °C (97.2 °F), Min:36.1 °C (96.9 °F), Max:36.5 °C (97.7 °F)      Vitals: BP (!) 146/92   Pulse 94   Temp 36.1 °C (97 °F) (Temporal)   Resp 15   Ht 1.651 m (5' 5\")   Wt 75.2 kg (165 lb 12.6 oz)   SpO2 93%   BMI 27.59 kg/m²       Intake/Output Summary (Last 24 hours) at 2022 1041  Last data filed at 2022 0800  Gross per 24 hour   Intake 3536.67 ml   Output 5550 ml   Net -2013.33 ml       Labs:   Latest Reference Range & Units 22 04:52   WBC 4.8 - 10.8 K/uL 4.1 (L)   Hemoglobin 14.0 - 18.0 g/dL 8.3 (L)   Hematocrit 42.0 - 52.0 % 25.1 (L)   Platelet Count 164 - 446 K/uL 35 (L)   Neutrophils-Polys 44.00 - 72.00 % 14.10 (L)   Neutrophils (Absolute) 1.82 - 7.42 K/uL 0.58 (L) [1]   Lymphocytes 22.00 - 41.00 % 24.80   Blasts % 61.10 (HH)      Latest Reference Range & Units 22 19:45   Sodium 135 - 145 mmol/L 139   Potassium 3.6 - 5.5 mmol/L 3.7   Chloride 96 - 112 mmol/L 104   Co2 20 - 33 mmol/L 25   Anion Gap 7.0 - 16.0  10.0   Glucose 65 - 99 mg/dL 165 (H)   Bun 8 - 22 mg/dL 15   Creatinine 0.50 - 1.40 mg/dL 0.57   GFR (CKD-EPI) >60 mL/min/1.73 m 2 143 [1]   Calcium 8.5 - 10.5 mg/dL 8.1 (L)   Phosphorus 2.5 - 4.5 mg/dL 3.8   Uric Acid 2.5 - 8.3 mg/dL 2.5       Physical Exam:    Constitutional: Sleepy but arousable and pleasant.   HENT: Normocephalic and atraumatic. TMs nonerythematous.  No rhinorrhea. Oropharynx is clear and moist.   Eyes: Conjunctivae are normal. No scleral icterus.   Neck: Supple, no adenopathy.    Cardiovascular: RRR w/o murmur.  Pulmonary/Chest: Effort normal. Symmetric expansion.  Clear to auscultation bilaterally.  Abdomen: Soft. Bowel sounds are normal. No distension, organomegaly or mass.     Neurological: Alert and oriented to person and place. Exhibits normal muscle tone bilaterally in upper and lower extremities.    Skin: Skin is warm, dry and pink.  No rash or evidence of " skin infection.       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     1) Precursor B-Cell Acute Lymphoblastic Leukemia:              - 2-3 weeks of symptoms              - Presenting WBC > 440 k/uL, hyperleukocytosis              - Start of Hydroxyurea (1500 mg PO Q8) 2320 on 5/27/2022  - discontinued on May 30 (55 hours of hydroxyurea < 72h allowed)              - Leukapharesis for hyperleukocytosis and risk for leukostasis - pheresed yesterday AM - post pharesis WBC count 71.3 k/uL              - No steroid pretreatment              - Peripheral flow cytometry remarkable for very large population of CD10 pos, CD19 pos, CD 20 negative lymphoblasts (communicated from Dr. Darden - Hematopathology 5/29/2022)               - CNS status PENDING (1 WBC, 3 RBC - awaiting pathology review)              - Testicular status NEGATIVE                     - Meets with all criteria for [id number]              - [id number] offered to patient.  Risks and benefits of study discussed in detail (see separate consent notes).  All questions answered.  Consent for [id number] signed.  Patient enrolled ON STUDY [id number].  Biobanking samples to be obtained at time of bone marrow biopsy and aspirate today                 - Given the following 1) Enrollment on [id number] 2) Patient meeting with all criteria for enrollment on COG [id number] 3) and Patient not meeting with any exclusion criteria for enrollment on COG [id number],  treatment ON STUDY COG [id number] offered to patient.  Patient HR by age alone.  The patient and his mother were given an opportunity to ask questions and all questions were answered before Tomas signed consent for enrollment ON STUDY [id number].                - New finding of Ph1 positivity makes JULY eligible for [id number]. Dr Faye has reviewed this protocol with JULY, who has signed consent and is now enrolled  on that protocol (so, no longer enrolled on [id number]).   - Per protocol, we can start treatment with imatinib even before Day 15.  I have provided JULY with some written information about the drug, which he wants to review, but we may start imatinib as early as tomorrow.                            - Pretreatment work-up as below:                          CBC: WBC 72.3, Hgb 6.5, platelets 53K (ANC 3250, Abs Peripheral Blast Count 52,923) (just prior to BM/study labs 5/30/2022)                          CMP:  AST 41, ALT 22, total bili 0.3, direct bili <0.2                          CXR - unremarkable                          ECHO - EF 75%                          Ferritin PENDING                          D-Dimer elevated at 1.05 5/30/2022                          Fibrinogen 324 as of 5/29/2022                          PT/aPTT 16/36.5 as of 5/28/2022                          CMV IgG positive    Varicella IgG positive    HSV1 seroPOSITIVE                                        High Risk Acute B-Lymphoblastic Leukemia, as per [id number], Induction, Day 3                          ** Cytarabine 70 mg IT x1 dose on Day 1 (see separate procedure note)                          ** Vincristine 2 mg IV on Days 1 (COMPLETE), 8, 15 and 22                          ** Daunorubicin 47.5 mg IV on Days 1 (COMPLETE), 8, 15 and 22                          ** PEG Aspargase 2500 IU/m2/dose on Day 4 (will postpone until tomorrow)                          ** Prednisone 60 mg PO BID, Day 4 of 28                          ** Methotrexate IT on Days 8 and 29     2) Disease-related Pancytopenia:   - CBC as noted; ANC still (barely) > 500 but functionally neutropenic   - Hgb, plts are adequate, for now              - Transfuse for Hgb < 7 or symptomatic              - Transfuse for platelets < 10K or symptomatic               - Neutropenic Precautions                 3) Fever and Neutropenia:              - Febrile to 38.5C on 5/28/2022 at 0945 -no  "fever since              - Blood cultures drawn x2 - NGTD              - D/Terrence cefepime on June 1.              - Monitor clinical status, can broaden coverage if medically indicated     4) 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:              - On presentation Uric Acid 15.6, LDH 1114              - High tumor burden initially; WBC now down              - S/P Rasburicase x 1 dose              - Allopurinol 200 mg po tid               - Hydration of NS at 100 mL/h               - Lab frequency decreased     5) Hypercoagulability / Superficial Right LE Thrombus / Subsegmental PE:              - Anticoagulation held for lumbar puncture yesterday              - Restarted heparin drip following procedure targeting therapeutic anti-Xa              - Platelets transfused allow for hemostasis during procedure, 53K prior to procedure              - Transitioned to apixaban on May 31 for anticoagulation through induction, likely continue to complete a 3-month course     6) Blurry vision:              - Possibly related to fluids/prednisone              - JULY is again c/o diplopia, as well              - Will consult ophthalmology (Dr Carranza is aware, will see JULY early tomorrow)     7) Epistaxis:   - Intermittent and mild   - JULY reports that nosebleeds are a long-standing issue   - Platelets still > 30k, but will plan transfusion this evening in anticipation of likely discharge tomorrow    8) \"Muffled\" hearing:   - Ear canals are clear (JULY had suspected ear wax was the issue)   -Trial of Sudafed   - Possible CN VIII symptom?    9)Hyperglycemia:   - Fairly stable/acceptable   - Prednisone side effect   - May worsen after PEG-asp; will monitor     Disposition:  Inpatient for treatment of Precursor B-Cell Acute Lymphoblastic Leukemia.  Will continue standard 4-drug induction as per [id number] but planning switch to [id number] on Day 15 (adding imatinib by that date or sooner).       Discussed with nursing staff, Dr. Carranza.  Total " time today approx 55 minutes.    ANN MARIE Rodriguez MD  Pediatric Hematology / Oncology  [facility]  Cell. [phone]  Office. [phone]

## 2022-06-02 NOTE — PROGRESS NOTES
Pt demonstrates ability to turn self in bed without assistance of staff. Patient and family understands importance in prevention of skin breakdown, ulcers, and potential infection. Hourly rounding in effect. RN skin check complete.   Devices in place include: Continuous monitoring devices, central line x1, PIV x1.  Skin assessed under devices: Yes.  Confirmed HAPI identified on the following date: N/A   Location of HAPI: N/A.  Wound Care RN following: No.  The following interventions are in place: Pt turns self from side to side, devices rotated with assessments, skin kept dry.

## 2022-06-02 NOTE — PROGRESS NOTES
Pt demonstrates ability to turn self in bed without assistance of staff. Patient and family understands importance in prevention of skin breakdown, ulcers, and potential infection. Hourly rounding in effect. RN skin check complete.     Devices in place include: x3 EKG stickers, pulse ox probe, BP cuff, x1 PIV, x1 PICC line, intermittent nasal cannula prn.  Skin assessed under devices: Yes.  Confirmed HAPI identified on the following date: no   Location of HAPI: NA.  Wound Care RN following: No.  The following interventions are in place: medical devices repositioned, new EKG stickers applied in new areas of skin, PIV assessed Q4hrs for infiltration/skin breakdown, PICC site assessed Q2hrs for infiltration/skin breakdown/bleeding from site, when nasal cannula in place - nares and skin on cheeks/upper ears assessed Q4hrs for skin breakdown & cannula repositioned often by pt when cannula is in place, pt repositions self often.

## 2022-06-02 NOTE — CARE PLAN
"  Problem: Nutritional:  Goal: Achieve adequate nutritional intake  Description: Patient will consume >50-75% of meals  Outcome: Progressing     Limited intake recorded, however two noted % meals from in n out burger. Per MD note pt reports \"OK\" appetite. RD continues encourage intake of all meals and snacks and continues to follow for adequate intake.   "

## 2022-06-02 NOTE — CARE PLAN
Problem: Respiratory  Goal: Patient will achieve/maintain optimum respiratory ventilation and gas exchange  Outcome: Progressing     Problem: Fluid Volume  Goal: Fluid volume balance will be maintained  Outcome: Progressing     The patient is Stable - Low risk of patient condition declining or worsening    Shift Goals  Clinical Goals: stable hemodynamics, remain on RA w/out desaturation, comfort, remain afebrile  Patient Goals: to sleep and hopefully go home tomorrow  Family Goals: for pt to rest tonight, have good lab results, and shower tomorrow/go home    Progress made toward(s) clinical / shift goals:  pt's VS stable noc with no need for prn HTN medication, pt remained on RA with intermittent desaturations into mid to high 80's with quick self recovery, pt rested comfortably noc with no complaints of discomfort, no fevers.     Patient is not progressing towards the following goals: NA

## 2022-06-02 NOTE — PROGRESS NOTES
Report received from MONTSERRAT Delgado.     Pt complaining of double and blurred vision upon bedside report along with mild headache.     Plan of care reviewed with pt, no questions at this time.

## 2022-06-02 NOTE — PROGRESS NOTES
Dr. Rodriguez notified of pt's platelets of 35, previously 44. No interventions ordered at this time.
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
## 2022-06-03 ENCOUNTER — APPOINTMENT (OUTPATIENT)
Dept: RADIOLOGY | Facility: MEDICAL CENTER | Age: 22
DRG: 834 | End: 2022-06-03
Attending: PEDIATRICS
Payer: COMMERCIAL

## 2022-06-03 DIAGNOSIS — C91.00 PHILADELPHIA CHROMOSOME POSITIVE ACUTE LYMPHOBLASTIC LEUKEMIA (ALL) (HCC): ICD-10-CM

## 2022-06-03 DIAGNOSIS — I26.99 PE (PULMONARY THROMBOEMBOLISM) (HCC): ICD-10-CM

## 2022-06-03 DIAGNOSIS — C91.00 PRE B-CELL ACUTE LYMPHOBLASTIC LEUKEMIA (ALL) (HCC): ICD-10-CM

## 2022-06-03 PROBLEM — H52.13 MYOPIA OF BOTH EYES: Status: ACTIVE | Noted: 2022-06-03

## 2022-06-03 PROBLEM — H49.22 LEFT ABDUCENS NERVE PALSY: Status: ACTIVE | Noted: 2022-06-03

## 2022-06-03 PROBLEM — D61.818 ACQUIRED PANCYTOPENIA (HCC): Status: ACTIVE | Noted: 2022-06-03

## 2022-06-03 LAB
ABO GROUP BLD: NORMAL
BARCODED ABORH UBTYP: 5100
BARCODED PRD CODE UBPRD: NORMAL
BARCODED UNIT NUM UBUNT: NORMAL
BASOPHILS # BLD AUTO: 0 % (ref 0–1.8)
BASOPHILS # BLD: 0 K/UL (ref 0–0.12)
BLASTS NFR BLD MANUAL: 38.4 %
BLD GP AB SCN SERPL QL: NORMAL
COMPONENT R 8504R: NORMAL
EOSINOPHIL # BLD AUTO: 0 K/UL (ref 0–0.51)
EOSINOPHIL NFR BLD: 0 % (ref 0–6.9)
ERYTHROCYTE [DISTWIDTH] IN BLOOD BY AUTOMATED COUNT: 49.3 FL (ref 35.9–50)
HCT VFR BLD AUTO: 22.4 % (ref 42–52)
HGB BLD-MCNC: 7.5 G/DL (ref 14–18)
LYMPHOCYTES # BLD AUTO: 0.58 K/UL (ref 1–4.8)
LYMPHOCYTES NFR BLD: 44.6 % (ref 22–41)
MANUAL DIFF BLD: ABNORMAL
MCH RBC QN AUTO: 29.8 PG (ref 27–33)
MCHC RBC AUTO-ENTMCNC: 33.5 G/DL (ref 33.7–35.3)
MCV RBC AUTO: 88.9 FL (ref 81.4–97.8)
MONOCYTES # BLD AUTO: 0.01 K/UL (ref 0–0.85)
MONOCYTES NFR BLD AUTO: 0.9 % (ref 0–13.4)
MORPHOLOGY BLD-IMP: NORMAL
MYELOCYTES NFR BLD MANUAL: 1.8 %
NEUTROPHILS # BLD AUTO: 0.19 K/UL (ref 1.82–7.42)
NEUTROPHILS NFR BLD: 14.3 % (ref 44–72)
NRBC # BLD AUTO: 0 K/UL
NRBC BLD-RTO: 0 /100 WBC
PLATELET # BLD AUTO: 49 K/UL (ref 164–446)
PLATELET BLD QL SMEAR: NORMAL
PMV BLD AUTO: 9.8 FL (ref 9–12.9)
PRODUCT TYPE UPROD: NORMAL
RBC # BLD AUTO: 2.52 M/UL (ref 4.7–6.1)
RBC BLD AUTO: NORMAL
RH BLD: NORMAL
UNIT STATUS USTAT: NORMAL
WBC # BLD AUTO: 1.3 K/UL (ref 4.8–10.8)

## 2022-06-03 PROCEDURE — A9270 NON-COVERED ITEM OR SERVICE: HCPCS | Performed by: INTERNAL MEDICINE

## 2022-06-03 PROCEDURE — 700111 HCHG RX REV CODE 636 W/ 250 OVERRIDE (IP): Performed by: PEDIATRICS

## 2022-06-03 PROCEDURE — 770001 HCHG ROOM/CARE - MED/SURG/GYN PRIV*

## 2022-06-03 PROCEDURE — 85007 BL SMEAR W/DIFF WBC COUNT: CPT

## 2022-06-03 PROCEDURE — 700117 HCHG RX CONTRAST REV CODE 255: Performed by: PEDIATRICS

## 2022-06-03 PROCEDURE — 86901 BLOOD TYPING SEROLOGIC RH(D): CPT

## 2022-06-03 PROCEDURE — 86945 BLOOD PRODUCT/IRRADIATION: CPT

## 2022-06-03 PROCEDURE — 85025 COMPLETE CBC W/AUTO DIFF WBC: CPT

## 2022-06-03 PROCEDURE — A9270 NON-COVERED ITEM OR SERVICE: HCPCS | Performed by: PEDIATRICS

## 2022-06-03 PROCEDURE — 36430 TRANSFUSION BLD/BLD COMPNT: CPT

## 2022-06-03 PROCEDURE — 700102 HCHG RX REV CODE 250 W/ 637 OVERRIDE(OP): Performed by: PEDIATRICS

## 2022-06-03 PROCEDURE — 70543 MRI ORBT/FAC/NCK W/O &W/DYE: CPT

## 2022-06-03 PROCEDURE — 700102 HCHG RX REV CODE 250 W/ 637 OVERRIDE(OP): Performed by: INTERNAL MEDICINE

## 2022-06-03 PROCEDURE — 86923 COMPATIBILITY TEST ELECTRIC: CPT

## 2022-06-03 PROCEDURE — 700105 HCHG RX REV CODE 258: Performed by: PEDIATRICS

## 2022-06-03 PROCEDURE — 700111 HCHG RX REV CODE 636 W/ 250 OVERRIDE (IP): Performed by: NURSE PRACTITIONER

## 2022-06-03 PROCEDURE — 86900 BLOOD TYPING SEROLOGIC ABO: CPT

## 2022-06-03 PROCEDURE — P9016 RBC LEUKOCYTES REDUCED: HCPCS

## 2022-06-03 PROCEDURE — 99233 SBSQ HOSP IP/OBS HIGH 50: CPT | Performed by: PEDIATRICS

## 2022-06-03 PROCEDURE — 99253 IP/OBS CNSLTJ NEW/EST LOW 45: CPT | Performed by: OPHTHALMOLOGY

## 2022-06-03 PROCEDURE — RXMED WILLOW AMBULATORY MEDICATION CHARGE: Performed by: PEDIATRICS

## 2022-06-03 PROCEDURE — A9576 INJ PROHANCE MULTIPACK: HCPCS | Performed by: PEDIATRICS

## 2022-06-03 PROCEDURE — 92060 SENSORIMOTOR EXAMINATION: CPT | Performed by: OPHTHALMOLOGY

## 2022-06-03 PROCEDURE — 86850 RBC ANTIBODY SCREEN: CPT

## 2022-06-03 RX ORDER — AMOXICILLIN 250 MG
2 CAPSULE ORAL DAILY
Qty: 40 TABLET | Refills: 1 | Status: SHIPPED | OUTPATIENT
Start: 2022-06-03 | End: 2022-07-14

## 2022-06-03 RX ORDER — SULFAMETHOXAZOLE AND TRIMETHOPRIM 400; 80 MG/1; MG/1
TABLET ORAL
Qty: 20 TABLET | Refills: 11 | Status: SHIPPED | OUTPATIENT
Start: 2022-06-03 | End: 2022-06-03 | Stop reason: SDUPTHER

## 2022-06-03 RX ORDER — PREDNISONE 20 MG/1
60 TABLET ORAL 2 TIMES DAILY
Qty: 132 TABLET | Refills: 0 | Status: SHIPPED | OUTPATIENT
Start: 2022-06-04 | End: 2022-06-23 | Stop reason: SDUPTHER

## 2022-06-03 RX ORDER — POLYETHYLENE GLYCOL 3350 17 G/17G
17 POWDER, FOR SOLUTION ORAL
Qty: 10 EACH | Refills: 3 | Status: ON HOLD | OUTPATIENT
Start: 2022-06-03 | End: 2022-08-29

## 2022-06-03 RX ORDER — IMATINIB MESYLATE 400 MG/1
400 TABLET, FILM COATED ORAL DAILY
Status: DISCONTINUED | OUTPATIENT
Start: 2022-06-04 | End: 2022-06-04 | Stop reason: HOSPADM

## 2022-06-03 RX ORDER — SULFAMETHOXAZOLE AND TRIMETHOPRIM 400; 80 MG/1; MG/1
TABLET ORAL
Qty: 40 TABLET | Refills: 11 | Status: SHIPPED | OUTPATIENT
Start: 2022-06-03 | End: 2022-06-23 | Stop reason: SDUPTHER

## 2022-06-03 RX ORDER — FAMOTIDINE 20 MG/1
20 TABLET, FILM COATED ORAL 2 TIMES DAILY
Qty: 60 TABLET | Refills: 1 | Status: SHIPPED | OUTPATIENT
Start: 2022-06-04 | End: 2022-06-03 | Stop reason: SDUPTHER

## 2022-06-03 RX ORDER — ALLOPURINOL 100 MG/1
100 TABLET ORAL ONCE
Status: COMPLETED | OUTPATIENT
Start: 2022-06-03 | End: 2022-06-03

## 2022-06-03 RX ORDER — AMOXICILLIN 250 MG
2 CAPSULE ORAL DAILY
Qty: 40 TABLET | Refills: 1 | Status: SHIPPED | OUTPATIENT
Start: 2022-06-03 | End: 2022-06-03 | Stop reason: SDUPTHER

## 2022-06-03 RX ORDER — IMATINIB MESYLATE 100 MG/1
200 TABLET, FILM COATED ORAL DAILY
Status: DISCONTINUED | OUTPATIENT
Start: 2022-06-03 | End: 2022-06-04 | Stop reason: HOSPADM

## 2022-06-03 RX ORDER — IMATINIB MESYLATE 400 MG/1
TABLET, FILM COATED ORAL
Qty: 15 TABLET | Refills: 0 | Status: SHIPPED | OUTPATIENT
Start: 2022-06-03 | End: 2022-06-13 | Stop reason: SDUPTHER

## 2022-06-03 RX ORDER — SODIUM CHLORIDE 0.9 % (FLUSH) 0.9 %
10 SYRINGE (ML) INJECTION EVERY 12 HOURS
Qty: 600 ML | Refills: 0 | Status: SHIPPED | OUTPATIENT
Start: 2022-06-03 | End: 2022-06-27 | Stop reason: SDUPTHER

## 2022-06-03 RX ORDER — FAMOTIDINE 20 MG/1
20 TABLET, FILM COATED ORAL 2 TIMES DAILY
Qty: 60 TABLET | Refills: 1 | Status: ON HOLD | OUTPATIENT
Start: 2022-06-04 | End: 2022-08-29

## 2022-06-03 RX ORDER — PREDNISONE 20 MG/1
60 TABLET ORAL 2 TIMES DAILY
Qty: 132 TABLET | Refills: 0 | Status: SHIPPED | OUTPATIENT
Start: 2022-06-04 | End: 2022-06-03 | Stop reason: SDUPTHER

## 2022-06-03 RX ORDER — IMATINIB MESYLATE 400 MG/1
TABLET, FILM COATED ORAL
Qty: 15 TABLET | Refills: 0 | Status: SHIPPED | OUTPATIENT
Start: 2022-06-03 | End: 2022-06-03 | Stop reason: SDUPTHER

## 2022-06-03 RX ORDER — POLYETHYLENE GLYCOL 3350 17 G/17G
17 POWDER, FOR SOLUTION ORAL
Qty: 10 EACH | Refills: 3 | Status: SHIPPED | OUTPATIENT
Start: 2022-06-03 | End: 2022-06-03 | Stop reason: SDUPTHER

## 2022-06-03 RX ADMIN — ALLOPURINOL 200 MG: 100 TABLET ORAL at 05:47

## 2022-06-03 RX ADMIN — APIXABAN 10 MG: 5 TABLET, FILM COATED ORAL at 17:44

## 2022-06-03 RX ADMIN — SENNOSIDES AND DOCUSATE SODIUM 2 TABLET: 50; 8.6 TABLET ORAL at 17:40

## 2022-06-03 RX ADMIN — PREDNISONE 60 MG: 10 TABLET ORAL at 05:46

## 2022-06-03 RX ADMIN — ALLOPURINOL 200 MG: 100 TABLET ORAL at 13:34

## 2022-06-03 RX ADMIN — HEPARIN 200 UNITS: 100 SYRINGE at 10:51

## 2022-06-03 RX ADMIN — PREDNISONE 60 MG: 10 TABLET ORAL at 17:41

## 2022-06-03 RX ADMIN — FAMOTIDINE 20 MG: 20 TABLET, FILM COATED ORAL at 05:47

## 2022-06-03 RX ADMIN — GADOTERIDOL 14 ML: 279.3 INJECTION, SOLUTION INTRAVENOUS at 19:09

## 2022-06-03 RX ADMIN — APIXABAN 10 MG: 5 TABLET, FILM COATED ORAL at 05:48

## 2022-06-03 RX ADMIN — PSEUDOEPHEDRINE HYDROCHLORIDE 60 MG: 60 TABLET ORAL at 07:41

## 2022-06-03 RX ADMIN — SODIUM CHLORIDE: 9 INJECTION, SOLUTION INTRAVENOUS at 05:54

## 2022-06-03 RX ADMIN — ALLOPURINOL 200 MG: 100 TABLET ORAL at 21:32

## 2022-06-03 RX ADMIN — FAMOTIDINE 20 MG: 20 TABLET, FILM COATED ORAL at 17:43

## 2022-06-03 RX ADMIN — ALLOPURINOL 100 MG: 100 TABLET ORAL at 14:00

## 2022-06-03 RX ADMIN — HEPARIN 200 UNITS: 100 SYRINGE at 03:58

## 2022-06-03 RX ADMIN — IMATINIB MESYLATE 200 MG: 100 TABLET, FILM COATED ORAL at 20:15

## 2022-06-03 ASSESSMENT — ENCOUNTER SYMPTOMS
EYE PAIN: 0
EYE DISCHARGE: 0
EYE REDNESS: 0
BLURRED VISION: 1
DOUBLE VISION: 1
PHOTOPHOBIA: 0

## 2022-06-03 ASSESSMENT — PAIN DESCRIPTION - PAIN TYPE
TYPE: ACUTE PAIN

## 2022-06-03 ASSESSMENT — FIBROSIS 4 INDEX: FIB4 SCORE: 3.57

## 2022-06-03 NOTE — PROGRESS NOTES
Pt demonstrates ability to turn self in bed without assistance of staff. Patient and family understands importance in prevention of skin breakdown, ulcers, and potential infection. Hourly rounding in effect. RN skin check complete.   Devices in place include: ekg x 3, pulse ox, bp cuff (removed). Picc line R arm  Skin assessed under devices: Yes.  Confirmed HAPI identified on the following date: na   Location of HAPI: na.  Wound Care RN following: No.  The following interventions are in place: turn self. ambulate.

## 2022-06-03 NOTE — PROGRESS NOTES
Lab called with critical result of ANC:0.19 at 0555. Critical lab result read back to lab.   Dr. Rodriguez notified of critical lab result at 0600.  Critical lab result read back by Dr. Rodriguez.

## 2022-06-03 NOTE — DISCHARGE INSTRUCTIONS
"PATIENT INSTRUCTIONS:      Given by:   Physician and Nurse    Instructed in:  If yes, include date/comment and person who did the instructions       JOSE LUIS:       Yes           PEDIATRICS ICU [facility]  [address] [address] Tomas Jean-Baptiste  MRN: [id number], : 2000, Sex: M  Adm: 2022, D/C: --         Order Information    Order Date/Time Release Date/Time Start Date/Time End Date/Time   22 05:03 PM None 22 05:04 PM 22 05:04 PM     Discharge Instructions [[id number]]    Electronically signed by: EDWAR De León on 22 1128 Status: Active   Mode: Ordering in Per Protocol mode Communicated by: Grecia Blanchard R.N.   Ordering user: Grecia Blanchard R.N. 22 1703 Ordering provider: EDWAR De León   Authorized by: EDWAR De León    Add Signature Requirement   Frequency: Once 22 1704 - 1  occurrence   Order comments: PICC Line Instructions How to Care for your PICC Do not take a bath, swim, or use hot tubs when you have a PICC. Cover PICC line with clear plastic wrap and tape to keep it dry while showering. Check the PICC insertion site daily for leakage, redness, swelling, or pain. Flush the PICC as directed by your health care provider. Let your health care provider know right away if the PICC is difficult to flush or does not flush. Do not use force to flush the PICC. Do not use a syringe that is less than 10 mL to flush the PICC. Avoid blood pressure checks on the arm with the PICC. Do not take the PICC out yourself. Only a trained clinical professional should remove the PICC. Make sure you or anyone who access your PICC Line washes their hands before using the line. Make sure the hub of the line is \"scrubbed\" prior to using the line. Dressing Changes Change the PICC dressing as instructed by your health care provider. Change your PICC dressing if it becomes loose or wet. When to seek medical attention PICC is " "accidentally pulled all the way out. There is any type of drainage, redness, or swelling where the PICC enters the skin. Noticeable increase in arm circumference due to swelling of arm. You cannot flush the PICC, it is difficult to flush, or the PICC leaks around the insertion site when it is flushed. You hear a \"flushing\" sound when the PICC is flushed. You notice a hole or tear in the PICC. You develop chills or a fever.       Verbal Order Info    Action Created on Order Mode Entered by Responsible Provider Signed by Signed on   Ordering 05/31/22 1703 Per Protocol MARY Marc A.P.R.N. Lindsay C Casci, A.P.R.N. 06/01/22 1128     Collection Information        Order Information    Date Department Ordering/Authorizing   5/31/2022 Pediatrics [facility] EDWAR De León     Acknowledgement Info    For At Acknowledged By Acknowledged On   Placing Order 05/31/22 1703 Sandy Jaquez R.N. 05/31/22 2050     Priority and Order Details    Priority Class    Routine Normal        Frequency Elements and Transportation Info    Frequency Total Count Count Type   ONCE 1 Occurrences     Standing Order Information    Remaining Occurrences Interval Last Released     0/1 ONCE 5/31/2022              Released/Completed Orders    Released On Released By Completed On Completed By   05/31/22 1703 Grecia Blanchard R.N. (auto-released)               Detailed Information    Priority and Order Details: DISCHARGE INSTRUCTIONS(1)             Reference Links               Encounter    View Encounter            Released Orders      Released On Scheduled For Released By    1. 5/31/2022  5:03 PM 5/31/2022  5:04 PM Grecia Blanchard R.N. (auto-released)             Order-Level Documents:    There are no order-level documents.    Encounter-Level Documents:    Scan on 5/30/2022 4:54 PM by Physician Outpatient  Scan on 5/30/2022 4:54 PM by Physician Outpatient  Scan on 5/30/2022 4:53 PM by Physician " "Outpatient  Scan on 5/30/2022 4:53 PM by Physician Outpatient  Scan on 5/30/2022 4:53 PM by Physician Outpatient  Scan on 5/30/2022 4:53 PM by Physician Outpatient  Scan on 5/30/2022 4:53 PM by Physician Outpatient  Scan on 5/30/2022 2:41 PM by Physician Outpatient  Scan on 5/30/2022 8:56 AM by Physician Outpatient  Scan on 5/29/2022 5:55 PM by Physician Outpatient  Scan on 5/29/2022 12:12 PM by Physician Outpatient  Scan on 5/29/2022 3:53 AM by Physician Outpatient  Scan on 5/28/2022 7:21 PM by Physician Outpatient  Scan on 5/28/2022 9:28 AM by Physician Outpatient  Scan on 5/31/2022 10:47 AM by Allisno Epperson: IR RECONCILIATION  Scan on 5/29/2022 12:19 PM by Jaymie Juarez           Daily Orders  Comment  Expand  Hide  (720h ago, onward)        Start   Ordered   06/07/22 1800  apixaban (ELIQUIS) tablet 5 mg  (apixaban (Eliquis) - DVT / PE Dosing)  2 TIMES DAILY        \"Followed by\" Linked Group Details    05/31/22 1306   06/04/22 0900  sulfamethoxazole-trimethoprim (BACTRIM) 400-80 MG per tablet 2 Tablet  2 times per day on Sun Sat         05/30/22 1733   06/03/22 0708  Shade Eye Black  ONCE         06/03/22 0707   06/03/22 0708  Pad Eye  ONCE         06/03/22 0707   06/03/22 0707  Shield Eye W/Garter  ONCE         06/03/22 0707   06/03/22 0247  DIFFERENTIAL MANUAL  ONCE         06/03/22 0247   06/03/22 0247  PERIPHERAL SMEAR REVIEW  ONCE         06/03/22 0247   06/03/22 0247  PLATELET ESTIMATE  ONCE         06/03/22 0247   06/03/22 0247  MORPHOLOGY  ONCE         06/03/22 0247   06/03/22 0000  IP Consult to Pediatric Opthalmology  ONCE       Provider:  (Not yet assigned)    06/02/22 0951   06/02/22 2111  ESTIMATED GFR  ONCE         06/02/22 2111   06/02/22 1517  Transfuse Platelet Pheresis-Nursing Communication  (Emergency Department or Inpatient Room)  Blood - Once       Comments:  Over 1 hour   \"And\" Linked Group Details    06/02/22 1517   06/02/22 1516  RELEASE PLATELET PHERESIS  (Emergency Department or " "Inpatient Room)  Transfusion        \"And\" Linked Group Details    06/02/22 1517   06/02/22 1516  pseudoephedrine (SUDAFED) tablet 60 mg  EVERY 6 HOURS PRN         06/02/22 1517   06/02/22 1024  ESTIMATED GFR  ONCE         06/02/22 1024   06/02/22 0452  DIFFERENTIAL MANUAL  ONCE         06/02/22 0452   06/02/22 0452  PERIPHERAL SMEAR REVIEW  ONCE         06/02/22 0452   06/02/22 0452  PLATELET ESTIMATE  ONCE         06/02/22 0452   06/02/22 0452  MORPHOLOGY  ONCE         06/02/22 0452   06/02/22 0300  CBC WITH DIFFERENTIAL  AM DAILY (LAB)       06/01/22 1005   06/02/22 0000  PLATELETS REQUEST  ONCE         06/02/22 0000   06/01/22 2100  PHOSPHORUS  EVERY 12 HOURS       06/01/22 1005   06/01/22 2100  URIC ACID  EVERY 12 HOURS     Comments:  The blood sample tube must be ...    06/01/22 1005   06/01/22 2100  Basic Metabolic Panel  EVERY 12 HOURS       06/01/22 1005   06/01/22 1945  ESTIMATED GFR  ONCE         06/01/22 1945   06/01/22 1004  PT Eval and Treat  ONCE         06/01/22 1005   06/01/22 0750  URIC ACID  ONCE         06/01/22 0750   06/01/22 0750  ESTIMATED GFR  ONCE         06/01/22 0750   06/01/22 0750  DIFFERENTIAL MANUAL  ONCE         06/01/22 0750   06/01/22 0750  PERIPHERAL SMEAR REVIEW  ONCE         06/01/22 0750   06/01/22 0750  PLATELET ESTIMATE  ONCE         06/01/22 0750   06/01/22 0750  MORPHOLOGY  ONCE         06/01/22 0750   06/01/22 0254  Print Patient Armband (Message to Admitting)  ONCE         06/01/22 0254   06/01/22 0006  DIFFERENTIAL MANUAL  ONCE         06/01/22 0006   06/01/22 0006  PERIPHERAL SMEAR REVIEW  ONCE         06/01/22 0006   06/01/22 0006  PLATELET ESTIMATE  ONCE         06/01/22 0006   06/01/22 0006  MORPHOLOGY  ONCE         06/01/22 0006   06/01/22 0006  ESTIMATED GFR  ONCE         06/01/22 0006   05/31/22 2200  allopurinol (ZYLOPRIM) tablet 200 mg  EVERY 8 HOURS         05/31/22 1741   05/31/22 1800  apixaban (ELIQUIS) tablet 10 mg  (apixaban (Eliquis) - DVT / PE Dosing)  2 " "TIMES DAILY        \"Followed by\" Linked Group Details    05/31/22 1306   05/31/22 1704  Nursing Communication  CONTINUOUS       Comments:  PICC placement is confirmed us...    05/31/22 1703   05/31/22 1704  Discharge Instructions  ONCE       Comments:  PICC Line Instructions How ...    05/31/22 1703   05/31/22 1645  ESTIMATED GFR  ONCE         05/31/22 1645   05/31/22 1645  DIFFERENTIAL MANUAL  ONCE         05/31/22 1645   05/31/22 1645  PERIPHERAL SMEAR REVIEW  ONCE         05/31/22 1645   05/31/22 1645  PLATELET ESTIMATE  ONCE         05/31/22 1645   05/31/22 1645  MORPHOLOGY  ONCE         05/31/22 1645   05/31/22 1551  Consent for all Surgical, Special Diagnostic or Therapeutic Procedures  ONCE       Comments:  If sterilization is a secondar...    05/31/22 1550   05/31/22 1321  midazolam (Versed) 2 MG/2ML injection 2 mg  ONCE PRN         05/31/22 1322   05/31/22 1245  IP Consult to Pediatric Intensivist  ONCE       Provider:  (Not yet assigned)    05/31/22 1244   05/31/22 1129  IR-PICC LINE PLACEMENT W/ GUIDANCE > AGE 5  1 TIME IMAGING         05/31/22 1130   05/31/22 1026  Histology Request  ONCE         05/31/22 1026   05/31/22 1001  LORazepam (ATIVAN) injection 2 mg  ONCE PRN         05/31/22 1001   05/31/22 0400  ESTIMATED GFR  ONCE         05/31/22 0400   05/31/22 0400  DIFFERENTIAL MANUAL  ONCE         05/31/22 0400   05/31/22 0400  PERIPHERAL SMEAR REVIEW  ONCE         05/31/22 0400   05/31/22 0400  PLATELET ESTIMATE  ONCE         05/31/22 0400   05/31/22 0400  MORPHOLOGY  ONCE         05/31/22 0400   05/31/22 0400  HSV I SPECIFIC IGG AB  ONCE         05/31/22 0400   05/31/22 0400  HSV II SPECIFIC IGG AB  ONCE         05/31/22 0400   05/31/22 0200  ondansetron (ZOFRAN) syringe/vial injection 8 mg  EVERY 8 HOURS         05/30/22 1733   05/31/22 0030  DIFFERENTIAL MANUAL  ONCE         05/31/22 0030   05/31/22 0030  PERIPHERAL SMEAR REVIEW  ONCE         05/31/22 0030   05/31/22 0030  PLATELET ESTIMATE  ONCE   " "      05/31/22 0030   05/31/22 0030  MORPHOLOGY  ONCE         05/31/22 0030   05/31/22 0000  BONE MARROW  ONCE,   Status:  Canceled       Comments:  Please call the Histology Depa...    05/29/22 1224   05/31/22 0000  Basic Metabolic Panel  ONCE         05/31/22 0000   05/31/22 0000  PHOSPHORUS  ONCE         05/31/22 0000   05/31/22 0000  URIC ACID  ONCE         05/31/22 0000   05/30/22 2127  Nursing Communication  CONTINUOUS       Comments:  OK to infuse chemotherapy thro...    05/30/22 2126   05/30/22 2000  ESTIMATED GFR  ONCE         05/30/22 2000 05/30/22 1937  COD - Adult (Type and Screen)  ONCE         05/30/22 1937 05/30/22 1929  CBC WITHOUT DIFFERENTIAL  ONCE,   Status:  Canceled         05/30/22 1928 05/30/22 1927  Transfuse Red Blood Cells-Nursing Communication  (RED BLOOD CELLS)  Blood - Once        \"And\" Linked Group Details    05/30/22 1927 05/30/22 1926  RELEASE RED BLOOD CELLS  (RED BLOOD CELLS)  Transfusion       Comments:  Infuse over 2-3 hours   \"And\" Linked Group Details    05/30/22 1927   05/30/22 1900  DAUNOrubicin (CERUBIDINE) 47.5 mg in NS 25 mL Chemo Infusion (PEDS ONC)  ONCE         05/30/22 1726 05/30/22 1830  vinCRIStine (ONCOVIN) 2 mg in NS 25 mL Chemo Infusion (PEDS ONC)  ONCE         05/30/22 1726 05/30/22 1800  famotidine (PEPCID) tablet 20 mg  2 TIMES DAILY         05/30/22 1733   05/30/22 1800  ondansetron (ZOFRAN) syringe/vial injection 8 mg  ONCE         05/30/22 1726   05/30/22 1800  predniSONE (DELTASONE) tablet 60 mg  2 TIMES DAILY         05/30/22 1726 05/30/22 1744  LORazepam (ATIVAN) injection 2 mg  EVERY 6 HOURS PRN         05/30/22 1744 05/30/22 1744  promethazine (PHENERGAN) tablet 25 mg  EVERY 6 HOURS PRN         05/30/22 1744 05/30/22 1744  ondansetron (ZOFRAN) syringe/vial injection 8 mg  EVERY 8 HOURS PRN         05/30/22 1744   05/30/22 1737  Prerequisites and Dose Modifications  ONCE       Comments:  Verify lab results prior to in...    05/30/22 1736 " "  05/30/22 1644  Comp Metabolic Panel  ONCE,   Status:  Canceled         05/30/22 1643   05/30/22 1643  BILIRUBIN TOTAL  ONCE,   Status:  Canceled         05/30/22 1643   05/30/22 1615  heparin lock flush 100 unit/mL injection 200 Units  (Pediatric (over 8 years old))  EVERY 6 HOURS         05/30/22 1556   05/30/22 1610  B-LYMPHOBLASTIC LEUKEMIA MRD  ONCE       Comments:  Please run sample for Day 1 Joaquin...    05/30/22 1609   05/30/22 1610  BONE MARROW  ONCE       Comments:  Please call the Histology Depa...    05/30/22 1609   05/30/22 1556  RN to palpate deep PICC site for infiltration  (Pediatric (over 8 years old))  EVERY FOUR HOURS         05/30/22 1556   05/30/22 1526  DX-CHEST-FOR LINE PLACEMENT Perform procedure in: Patient's Room  1 TIME IMAGING         05/30/22 1526   05/30/22 1525  DX-CHEST-FOR LINE PLACEMENT Perform procedure in: Patient's Room  1 TIME IMAGING,   Status:  Canceled         05/30/22 1525   05/30/22 1520  Miscellaneous Test  ONCE,   Status:  Canceled       Comments:  Acute Lymphoblastic Leukemia (...    05/30/22 1520   05/30/22 1515  Cytology  ONCE         05/30/22 1515   05/30/22 1515  Pathology Specimen  ONCE         05/30/22 1515   05/30/22 1459  Chemotherapy Safety Cart  (Chemo Supply Cart & Waste Container Panel )  ONCE        \"And\" Linked Group Details    05/30/22 1458   05/30/22 1459  Yellow Chemo Waste Can and Lids (For Dirty Capar)  (Chemo Supply Cart & Waste Container Panel )  ONCE        \"And\" Linked Group Details    05/30/22 1458   05/30/22 1425  Pathology Specimen  ONCE         05/30/22 1425   05/30/22 1405  CSF Cell Count  ONCE         05/30/22 1405   05/30/22 1400  LORazepam (ATIVAN) injection 2 mg  ONCE         05/30/22 1336   05/30/22 1400  ondansetron (ZOFRAN) syringe/vial injection 8 mg  EVERY 8 HOURS,   Status:  Discontinued         05/30/22 1341   05/30/22 1339  Consent for all Surgical, Special Diagnostic or Therapeutic Procedures  ONCE       Comments:  If sterilization " "is a secondar...    05/30/22 1339   05/30/22 1338  CBC WITHOUT DIFFERENTIAL  ONCE,   Status:  Canceled         05/30/22 1338   05/30/22 1329  CBC WITHOUT DIFFERENTIAL  ONCE,   Status:  Canceled         05/30/22 1328   05/30/22 1320  CBC WITHOUT DIFFERENTIAL  ONCE,   Status:  Canceled         05/30/22 1319   05/30/22 1315  PLATELET ESTIMATE  ONCE         05/30/22 1315   05/30/22 1315  MORPHOLOGY  ONCE         05/30/22 1315   05/30/22 1315  PERIPHERAL SMEAR REVIEW  ONCE         05/30/22 1315   05/30/22 1315  DIFFERENTIAL MANUAL  ONCE         05/30/22 1315   05/30/22 1147  Peds Transfuse Platelet Pheresis  (Platelets (units):)  Blood - Once,   Status:  Canceled        \"And\" Linked Group Details    05/30/22 1147   05/30/22 1146  PEDS RELEASE PLATELET PHERESIS (UNITS)  (Platelets (units):)  Transfusion       Comments:  Transfuse over 1hour(s).   \"And\" Linked Group Details    05/30/22 1147   05/30/22 1104  CBC WITHOUT DIFFERENTIAL  ONCE         05/30/22 1104   05/30/22 1100  Setup Equipment and Items at Bedside Prior to Procedure (coordinate with RN)  (RC) ONCE,   Status:  Canceled       Comments:  Have the following items and e...    05/30/22 0833   05/30/22 1049  Peds Transfuse Platelet Pheresis  (Platelets (units):)  Blood - Once,   Status:  Canceled        \"And\" Linked Group Details    05/30/22 1049   05/30/22 1048  PEDS RELEASE PLATELET PHERESIS (UNITS)  (Platelets (units):)  Transfusion       Comments:  Transfuse over 1 hour(s).   \"And\" Linked Group Details    05/30/22 1049   05/30/22 1010  EC-ECHOCARDIOGRAM COMPLETE W/O CONT  1 TIME IMAGING         05/28/22 0953   05/30/22 0949  Consent for all Surgical, Special Diagnostic or Therapeutic Procedures  ONCE       Comments:  If sterilization is a secondar...    05/30/22 0949   05/30/22 0947  Consent for all Surgical, Special Diagnostic or Therapeutic Procedures  ONCE       Comments:  If sterilization is a secondar...    05/30/22 0948   05/30/22 0915  ondansetron " (ZOFRAN) syringe/vial injection 8 mg  ONCE,   Status:  Discontinued         05/30/22 0858   05/30/22 0915  cytarabine PF (VERITO-C) 70 mg in syringe 6 mL Intrathecal Chemotherapy (PEDS ONC)  ONCE,   Status:  Discontinued         05/30/22 0859   05/30/22 0900  IONIZED CALCIUM  EVERY 8 HOURS,   Status:  Canceled       05/30/22 0819   05/30/22 0900  NS infusion 1,000 mL  ONCE         05/30/22 0833   05/30/22 0830  Comp Metabolic Panel  ONCE         05/30/22 0830   05/30/22 0830  BILIRUBIN INDIRECT  ONCE         05/30/22 0830   05/30/22 0830  ESTIMATED GFR  ONCE         05/30/22 0830   05/30/22 0821  ketamine IV injection  (ketamine (KETALAR) - no antidote:)  NICU/PICU MOD SED MULT OCCURRENCE         05/30/22 0833   05/30/22 0820  EC-ECHOCARDIOGRAM PEDIATRIC COMPLETE W/O CONT  1 TIME IMAGING,   Status:  Canceled       Comments:  Please evaluate ejection fract...    05/30/22 0819   05/30/22 0820  HSV 1/2 IGG W/ TYPE SPECIFIC RFLX  ONCE       Comments:  Can be obtained following cent...    05/30/22 0819   05/30/22 0820  VARICELLA ZOSTER IGG AB  ONCE       Comments:  Can be obtained following cent...    05/30/22 0819   05/30/22 0820  CMV ABS IGG & IGM, SERUM  ONCE       Comments:  Can be obtained following cent...    05/30/22 0819   05/30/22 0820  CMP  ONCE,   Status:  Canceled         05/30/22 0819   05/30/22 0820  LIPASE  ONCE         05/30/22 0819   05/30/22 0820  BILIRUBIN DIRECT  ONCE         05/30/22 0819   05/30/22 0820  URINALYSIS  ONCE,   Status:  Canceled         05/30/22 0819   05/30/22 0820  D-DIMER  ONCE         05/30/22 0819   05/30/22 0820  BONE MARROW  ONCE,   Status:  Canceled       Comments:  Please call the Histology Depa...    05/30/22 0819   05/30/22 0820  CSF CELL COUNT  ONCE,   Status:  Canceled       Comments:  Manual Diff.    05/30/22 0819   05/30/22 0820  CYTOLOGY  ONCE,   Status:  Canceled         05/30/22 0819 05/30/22 0820  Pregnancy Test Not Applicable  ONCE,   Status:  Canceled          05/30/22 0819 05/30/22 0820  Prerequisites and Dose Modifications  ONCE,   Status:  Canceled       Comments:  Verify lab results prior to in...    05/30/22 0819 05/30/22 0819  Prior to Procedure:  Baseline HR, RR, SaO2, Temp, and level of consciousness  ONCE       Comments:  Prior to procedure - Obtain a ...    05/30/22 0833 05/30/22 0819  Intra-Procedure:   HR, RR, SaO2, and level of sedation every 5 minutes  EVERY 5 MIN,   Status:  Canceled       Comments:  Intra-procedure - Monitor and ...    05/30/22 0833 05/30/22 0819  End of Procedure:  Temperature  ONCE,   Status:  Canceled       Comments:  End of Procedure - Obtain and ...    05/30/22 0833 05/30/22 0819  Post Procedure:  HR, RR, SaO2, and level of sedation every 10 min  EVERY 10 MIN       Comments:  Post procedure - Monitor and d...    05/30/22 0833 05/30/22 0819  Notify provider performing sedation of patients arrival  ONCE,   Status:  Canceled       Comments:  Notify provider performing sed...    05/30/22 0833 05/30/22 0819  Notify provider when the following discharge criteria is met  ONCE,   Status:  Canceled       Comments:  Discharge/Transfer Criteria fo...    05/30/22 0833 05/30/22 0819  NPO prior to procedure (2 hours for clear liquids, 4 hours for breast milk, 6 hours for solids)  ONCE,   Status:  Canceled       Comments:  Please notify kitchen within 9...    05/30/22 0833 05/30/22 0819  Verify Consent Has Been Obtained  ONCE         05/30/22 0833 05/30/22 0819  Pulse Ox  CONTINUOUS       Comments:  Continuous cardio-respiratory ...    05/30/22 0833 05/30/22 0819  End Tidal CO2 Monitoring Continuous During Procedure  CONTINUOUS,   Status:  Canceled       Comments:  Continuous during procedure on...    05/30/22 0833 05/30/22 0818  lidocaine-prilocaine (EMLA) 2.5-2.5 % cream 1 Application  PRN         05/30/22 0833 05/30/22 0745  APHERESIS  ONCE DAILY,   Status:  Canceled     Comments:  2g calcium gluconate prior to ...     "05/30/22 0645   05/30/22 0745  calcium GLUConate injection 4 g  (Plasmapheresis Panel)  ONCE         05/30/22 0727   05/30/22 0735  Heparin Anti-Xa  ONCE         05/30/22 0735   05/30/22 0735  ESTIMATED GFR  ONCE         05/30/22 0735   05/30/22 0735  DIFFERENTIAL MANUAL  ONCE         05/30/22 0735   05/30/22 0735  PERIPHERAL SMEAR REVIEW  ONCE         05/30/22 0735   05/30/22 0735  PLATELET ESTIMATE  ONCE         05/30/22 0735   05/30/22 0735  MORPHOLOGY  ONCE         05/30/22 0735   05/30/22 0634  Print Patient Labels (Message to Admitting)  ONCE,   Status:  Canceled         05/30/22 0633   05/30/22 0300  Renal Function Panel  TOMORROW AM (LAB)         05/29/22 0620   05/30/22 0034  Peds Transfuse Platelet Pheresis  (Platelets (units):)  Blood - Once,   Status:  Canceled        \"And\" Linked Group Details    05/30/22 0034   05/30/22 0033  PEDS RELEASE PLATELET PHERESIS (UNITS)  (Platelets (units):)  Transfusion       Comments:  Transfuse over 1 hour(s).   \"And\" Linked Group Details    05/30/22 0034   05/30/22 0010  DIFFERENTIAL MANUAL  ONCE         05/30/22 0010   05/30/22 0010  PERIPHERAL SMEAR REVIEW  ONCE         05/30/22 0010   05/30/22 0010  PLATELET ESTIMATE  ONCE         05/30/22 0010   05/30/22 0010  MORPHOLOGY  ONCE         05/30/22 0010   05/30/22 0010  ESTIMATED GFR  ONCE         05/30/22 0010   05/30/22 0000  ABO AND RH DETERMINATION  ONCE         05/30/22 0000   05/30/22 0000  PLATELETS REQUEST  ONCE         05/30/22 0000   05/30/22 0000  ANTICOAGULANT CIT DEXT SOLN A 0.8-2.45-2.2 GM/100ML VI SOLN       Note to Pharmacy:  Vidal Johnston: cabinet overr...    05/30/22 0653   05/30/22 0000  Chemotherapy Injection into CNS w/ Lumbar Puncture  Status:  Canceled         05/30/22 0858   05/30/22 0000  ALL PANEL BY FISH, PEDIATRIC  ONCE         05/30/22 0000   05/30/22 0000  CHROMOSOME FISH, INTERPHASE  ONCE         05/30/22 0000   05/30/22 0000  CHROMOSOME ANALYSIS, LEUKEMIC BLOOD  ONCE,   Status:  Canceled  "        05/30/22 0000   05/30/22 0000  LEUK/LYMPH PHENOTYPING, FLOW CYTOMETRY  ONCE,   Status:  Canceled         05/30/22 0000   05/30/22 0000  CHROMOSOME ANALYSIS, LEUKEMIC BLOOD  ONCE         05/30/22 0000   05/30/22 0000  CBC WITHOUT DIFFERENTIAL  ONCE,   Status:  Canceled         05/30/22 0000   05/29/22 2323  Nursing Communication  CONTINUOUS,   Status:  Canceled       Comments:  HOLD heparin drip at 600 AM on...    05/29/22 2324   05/29/22 1800  hydroxyurea (HYDREA) capsule 1,500 mg  3 TIMES DAILY,   Status:  Discontinued       Note to Pharmacy:  ([facility])    05/29/22 1223   05/29/22 1644  ESTIMATED GFR  ONCE         05/29/22 1644   05/29/22 1644  DIFFERENTIAL MANUAL  ONCE         05/29/22 1644   05/29/22 1644  PERIPHERAL SMEAR REVIEW  ONCE         05/29/22 1644   05/29/22 1644  PLATELET ESTIMATE  ONCE         05/29/22 1644   05/29/22 1644  MORPHOLOGY  ONCE         05/29/22 1644   05/29/22 1435  Transfer Patient  ONCE         05/29/22 1436   05/29/22 0950  FIBRINOGEN  ONCE         05/29/22 0950   05/29/22 0950  ESTIMATED GFR  ONCE         05/29/22 0950   05/29/22 0950  DIFFERENTIAL MANUAL  ONCE         05/29/22 0950   05/29/22 0950  PERIPHERAL SMEAR REVIEW  ONCE         05/29/22 0950   05/29/22 0950  PLATELET ESTIMATE  ONCE         05/29/22 0950   05/29/22 0950  MORPHOLOGY  ONCE         05/29/22 0950   05/29/22 0945  calcium GLUConate injection 5 g  (Plasmapheresis Panel)  ACUTE DIALYSIS ONCE         05/29/22 0922   05/29/22 0930  NS infusion  CONTINUOUS         05/29/22 0905   05/29/22 0900  Heparin Anti-Xa  ONCE,   Status:  Canceled         05/29/22 0715   05/29/22 0900  Heparin Anti-Xa  ONCE         05/29/22 0724   05/29/22 0830  TSH WITH REFLEX TO FT4  ONCE         05/29/22 0829   05/29/22 0745  APHERESIS  ONCE DAILY,   Status:  Canceled     Comments:  2 amps calcium gluconate prior...    05/29/22 0618   05/29/22 0713  Print Patient Armband (Message to Admitting)  ONCE,   Status:  Canceled          05/29/22 0712   05/29/22 0300  CBC WITH DIFFERENTIAL  TOMORROW AM (LAB),   Status:  Canceled         05/28/22 1345   05/29/22 0140  ESTIMATED GFR  ONCE         05/29/22 0140   05/29/22 0140  PLATELET ESTIMATE  ONCE         05/29/22 0140   05/29/22 0140  MORPHOLOGY  ONCE         05/29/22 0140   05/29/22 0140  PERIPHERAL SMEAR REVIEW  ONCE         05/29/22 0140   05/29/22 0140  DIFFERENTIAL MANUAL  ONCE         05/29/22 0140   05/29/22 0030  Heparin Anti-Xa  ONCE         05/28/22 1829   05/29/22 0030  FIBRINOGEN  ONCE         05/29/22 0030   05/29/22 0000  ANTICOAGULANT CIT DEXT SOLN A 0.8-2.45-2.2 GM/100ML VI SOLN       Note to Pharmacy:  Briana Noonan: cabinet over...    05/29/22 1122   05/28/22 1718  ESTIMATED GFR  ONCE         05/28/22 1718   05/28/22 1718  DIFFERENTIAL MANUAL  ONCE         05/28/22 1718   05/28/22 1718  PERIPHERAL SMEAR REVIEW  ONCE         05/28/22 1718   05/28/22 1718  PLATELET ESTIMATE  ONCE         05/28/22 1718   05/28/22 1718  MORPHOLOGY  ONCE         05/28/22 1718   05/28/22 1700  Basic Metabolic Panel  EVERY 8 HOURS,   Status:  Canceled       05/28/22 0956   05/28/22 1700  CBC WITH DIFFERENTIAL  EVERY EIGHT HOURS,   Status:  Canceled       05/28/22 1239   05/28/22 1700  FIBRINOGEN  EVERY EIGHT HOURS,   Status:  Canceled       05/28/22 1306   05/28/22 1532  Oxygen Per Guideline  CONTINUOUS         05/28/22 0931   05/28/22 1400  cefepime (Maxipime) 2 g in dextrose 5% 20 mL IV syringe  EVERY 8 HOURS,   Status:  Discontinued         05/28/22 0954   05/28/22 1130  sodium chloride (OCEAN) 0.65 % nasal spray 2 Spray  EVERY 2 HOURS PRN         05/28/22 1131   05/28/22 1113  Histology Request  ONCE         05/28/22 1113   05/28/22 1030  Pathology Specimen  ONCE         05/28/22 1030   05/28/22 1030  Basic Metabolic Panel  ONCE         05/28/22 1030   05/28/22 1030  ESTIMATED GFR  ONCE         05/28/22 1030   05/28/22 1015  calcium GLUConate injection 4 g  (Plasmapheresis Panel)  ONCE          05/28/22 0950   05/28/22 0956  CBC WITHOUT DIFFERENTIAL  ONCE         05/28/22 0956   05/28/22 0955  BLOOD CULTURE  (Blood Cultures x 2)  ONCE       Comments:  Per Hospital Policy: Only cortes...    05/28/22 0954   05/28/22 0955  BLOOD CULTURE  (Blood Cultures x 2)  ONCE       Comments:  Per Hospital Policy: Only cortes...    05/28/22 0954   05/28/22 0955  DX-CHEST-PORTABLE (1 VIEW)  ONCE         05/28/22 0954   05/28/22 0955  URINALYSIS  ONCE         05/28/22 0954   05/28/22 0954  EC-ECHOCARDIOGRAM COMPLETE W/ CONT  1 TIME IMAGING,   Status:  Canceled         05/28/22 0953   05/28/22 0953  EKG  ONCE         05/28/22 0953   05/28/22 0935  acetaminophen (Tylenol) tablet 650 mg  EVERY 4 HOURS PRN         05/28/22 0935   05/28/22 0932  Vital signs Q15Min X4, then Q30Min X2, then per policy  ONCE,   Status:  Canceled       Comments:  Vital signs Q15Min X4, then Q3...    05/28/22 0931   05/28/22 0932  Notify Radiologist  ONCE,   Status:  Canceled       Comments:  For Development of or increase...    05/28/22 0931   05/28/22 0932  Notify provider if pain remains uncontrolled.  CONTINUOUS,   Status:  Canceled       Comments:  If patient on oral with IV lele...    05/28/22 0931   05/28/22 0932  Activity/Weight-bearing/Mobility Order- Strict Bed Rest  CONTINUOUS       Comments:  Resume previous activity after...    05/28/22 0931   05/28/22 0932  Procedure puncture site check every 15 min for 1 hour, then every 30 min for 1 hour, then every hour for 4 hours  CONTINUOUS       Comments:  Check procedure puncture site ...    05/28/22 0931   05/28/22 0932  Pulse Ox  CONTINUOUS,   Status:  Canceled       Comments:  Patient to remain on continuou...    05/28/22 0931   05/28/22 0932  OK to use central catheter  CONTINUOUS       Comments:  May use interventional radiolo...    05/28/22 0931   05/28/22 0932  Scheduled acetaminophen (TYLENOL) not appropriate at this time for pain management  CONTINUOUS       Comments:  Scheduled  acetaminophen (TYLEN...    05/28/22 0931   05/28/22 0932  Scheduled NSAID not appropriate at this time for pain management.  CONTINUOUS       Comments:  Scheduled NSAID not appropriat...    05/28/22 0931   05/28/22 0932  If patient difficult to arouse and/or has respiratory depression (respiratory rate of 10 or less), stop any opiates that are currently infusing and call a Rapid Response.  CONTINUOUS,   Status:  Canceled       Comments:  If patient difficult to arouse...    05/28/22 0931   05/28/22 0930  Heparin Anti-Xa  ONCE         05/28/22 0348   05/28/22 0902  Transfer Patient  ONCE         05/28/22 0902   05/28/22 0900  URIC ACID  EVERY 8 HOURS,   Status:  Canceled     Comments:  The blood sample tube must be ...    05/27/22 2227   05/28/22 0849  Moderate  ONCE,   Status:  Canceled         05/28/22 0848   05/28/22 0849  CLASS 3  ONCE,   Status:  Canceled       Comments:  (Visualization of soft palate ...    05/28/22 0848   05/28/22 0849  CLASS 3  ONCE,   Status:  Canceled       Comments:  (Severe Systemic Disease, but ...    05/28/22 0848   05/28/22 0849  If patient difficult to arouse and/or has respiratory depression (respiratory rate of 10 or less), stop any opiates that are currently infusing and call a Rapid Response.  CONTINUOUS,   Status:  Canceled       Comments:  If patient difficult to arouse...    05/28/22 0848   05/28/22 0848  NS (BOLUS) infusion 500 mL  ONCE PRN         05/28/22 0848   05/28/22 0848  fentaNYL (SUBLIMAZE) injection 12.5-50 mcg  PRN         05/28/22 0848   05/28/22 0848  midazolam (VERSED) injection 0.5-2 mg  PRN         05/28/22 0848   05/28/22 0848  ondansetron (ZOFRAN) syringe/vial injection 4 mg  EVERY 6 HOURS PRN         05/28/22 0848   05/28/22 0845  Miscellaneous Test  ONCE,   Status:  Canceled       Comments:  [facility] # [id number]    05/28/22 0845   05/28/22 0844  BCR-ABL1 QUAL WITH REFLEX  ONCE,   Status:  Canceled         05/28/22 0843   05/28/22 0819  Telemetry Monitoring   CONTINUOUS,   Status:  Canceled         05/28/22 0818   05/28/22 0800  Consent for all Surgical, Special Diagnostic or Therapeutic Procedures  ONCE       Comments:  If sterilization is a secondar...    05/28/22 0800   05/28/22 0758  IR-CVC NON TUNNELED > AGE 5  1 TIME IMAGING         05/27/22 1756   05/28/22 0745  APHERESIS  ONCE DAILY,   Status:  Canceled     Comments:  1 amp calcium gluconate prior ...    05/28/22 0745   05/28/22 0721  LEUK/LYMPH PHENOTYPING, FLOW CYTOMETRY  ONCE,   Status:  Canceled         05/28/22 0721   05/28/22 0721  CBC WITHOUT DIFFERENTIAL  ONCE,   Status:  Canceled         05/28/22 0721   05/28/22 0705  HEPATITIS PANEL ACUTE(4 COMPONENTS)  ONCE         05/28/22 0704   05/28/22 0705  HEP B SURFACE AB  ONCE,   Status:  Canceled         05/28/22 0704   05/28/22 0345  heparin infusion 25,000 units in 500 mL 0.45% NACL  (NO Bolus or Re-bolus)  CONTINUOUS,   Status:  Discontinued         05/28/22 0326   05/28/22 0325  Heparin Xa (Unfractionated)  PRN,   Status:  Canceled     Comments:  Check anti-Xa level 6 hours af...    05/28/22 0326   05/28/22 0300  CBC with Differential  TOMORROW AM (LAB)         05/27/22 1837   05/28/22 0300  Comp Metabolic Panel (CMP)  TOMORROW AM (LAB)         05/27/22 1837   05/28/22 0230  Protective Isolation  CONTINUOUS         05/28/22 0229   05/28/22 0130  heparin infusion 25,000 units in 500 mL 0.45% NACL  (NO Bolus or Re-bolus)  CONTINUOUS,   Status:  Discontinued         05/28/22 0101   05/28/22 0123  IP Consult to   ONCE       Provider:  (Not yet assigned)  Comments:  Pt's mother would like to spea...    05/28/22 0124   05/28/22 0101  aPTT  ONCE,   Status:  Canceled         05/28/22 0101   05/28/22 0101  Prothrombin Time  ONCE,   Status:  Canceled         05/28/22 0101   05/28/22 0101  Heparin Xa (Unfractionated)  ONCE,   Status:  Canceled         05/28/22 0101   05/28/22 0100  Heparin Xa (Unfractionated)  PRN,   Status:  Canceled     Comments:   Check anti-Xa level 6 hours af...    05/28/22 0101   05/28/22 0100  URIC ACID  EVERY 8 HOURS,   Status:  Canceled       05/27/22 2129 05/28/22 0100  PHOSPHORUS  EVERY 8 HOURS,   Status:  Canceled       05/27/22 2129 05/28/22 0100  Basic Metabolic Panel  EVERY 8 HOURS,   Status:  Canceled       05/27/22 2129 05/28/22 0100  URIC ACID  EVERY 8 HOURS,   Status:  Canceled     Comments:  The blood sample tube must be ...    05/27/22 2213   05/28/22 0009  ESTIMATED GFR  ONCE         05/28/22 0009   05/28/22 0009  ESTIMATED GFR  ONCE         05/28/22 0009   05/28/22 0009  DIFFERENTIAL MANUAL  ONCE         05/28/22 0009   05/28/22 0009  PERIPHERAL SMEAR REVIEW  ONCE         05/28/22 0009   05/28/22 0009  PATH INTERP HEME  ONCE,   Status:  Canceled         05/28/22 0009   05/28/22 0009  PLATELET ESTIMATE  ONCE         05/28/22 0009   05/28/22 0009  MORPHOLOGY  ONCE         05/28/22 0009   05/28/22 0000  HEPARIN SODIUM LOCK FLUSH 100 UNIT/ML IV SOLN       Note to Pharmacy:  Carrie Hurt: cabinet overri...    05/28/22 0807   05/28/22 0000  REFERENCE MISC.AMBIENT  ONCE,   Status:  Canceled         05/28/22 0000   05/28/22 0000  HEP B SURFACE AB  ONCE         05/28/22 0000   05/28/22 0000  ANTICOAGULANT CIT DEXT SOLN A 0.8-2.45-2.2 GM/100ML VI SOLN       Note to Pharmacy:  Enedelia Akhtar: cabinet override    05/28/22 1243   05/27/22 2300  heparin infusion 25,000 units in 500 mL 0.45% NACL  (NO Bolus or Re-bolus)  CONTINUOUS,   Status:  Discontinued         05/27/22 2238 05/27/22 2239  FIBRINOGEN  ONCE         05/27/22 2238 05/27/22 2238  aPTT  ONCE         05/27/22 2238   05/27/22 2238  Prothrombin Time  ONCE         05/27/22 2238   05/27/22 2238  Heparin Xa (Unfractionated)  ONCE         05/27/22 2238 05/27/22 2237  Heparin Xa (Unfractionated)  PRN,   Status:  Canceled     Comments:  Check anti-Xa level 6 hours af...    05/27/22 2238 05/27/22 2200  febuxostat (ULORIC) tablet 120 mg  DAILY,   Status:  Discontinued     "     05/27/22 2154 05/27/22 2100  rasburicase (ELITEK) 3 mg in NS 50 mL ivpb  ONCE         05/27/22 2033 05/27/22 2100  hydroxyurea (HYDREA) capsule 1,000 mg  3 TIMES DAILY,   Status:  Discontinued       Note to Pharmacy:  ([facility])    05/27/22 2037 05/27/22 1922  Diet Order Diet: Regular (Encourage PO fluids)  (Diet Order Panel)  ALL MEALS         05/27/22 1923 05/27/22 1900  senna-docusate (PERICOLACE or SENOKOT S) 8.6-50 MG per tablet 2 Tablet  (Bowel Regimen)  2 TIMES DAILY        \"And\" Linked Group Details    05/27/22 1837 05/27/22 1900  NS infusion  CONTINUOUS         05/27/22 1837 05/27/22 1837  ondansetron (ZOFRAN) syringe/vial injection 4 mg  (Antiemetics under 65 age groups - Admission)  EVERY 4 HOURS PRN,   Status:  Discontinued         05/27/22 1837 05/27/22 1837  ondansetron (ZOFRAN ODT) dispertab 4 mg  (Antiemetics under 65 age groups - Admission)  EVERY 4 HOURS PRN,   Status:  Discontinued         05/27/22 1837 05/27/22 1837  promethazine (PHENERGAN) tablet 12.5-25 mg  (Antiemetics under 65 age groups - Admission)  EVERY 4 HOURS PRN,   Status:  Discontinued         05/27/22 1837 05/27/22 1837  promethazine (PHENERGAN) suppository 12.5-25 mg  (Antiemetics under 65 age groups - Admission)  EVERY 4 HOURS PRN,   Status:  Discontinued         05/27/22 1837 05/27/22 1837  prochlorperazine (COMPAZINE) injection 5-10 mg  (Antiemetics under 65 age groups - Admission)  EVERY 4 HOURS PRN,   Status:  Discontinued         05/27/22 1837 05/27/22 1837  hydrALAZINE (APRESOLINE) injection 10 mg  (Antihypertensives under 65 age groups - Admission )  EVERY 4 HOURS PRN         05/27/22 1837 05/27/22 1837  Reason Mechanical DVT/VTE Prophylaxis Not Ordered:  CONTINUOUS         05/27/22 1837   05/27/22 1837  Reason Pharmacologic DVT/VTE Prophylaxis Not Ordered:  CONTINUOUS         05/27/22 1837   05/27/22 1837  Notify provider if pain remains uncontrolled  (LOW DOSE - Vulnerable Opioid Naive " "(hydromorphone))  CONTINUOUS,   Status:  Canceled       Comments:  If patient on oral with IV lele...   \"And\" Linked Group Details    05/27/22 1837 05/27/22 1837  Use the Numeric Rating Scale (NRS), Araujo-Baker Faces (WBF), or FLACC on regular floors and Critical-Care Pain Observation Tool (CPOT) on ICUs/Trauma to assess pain  (LOW DOSE - Vulnerable Opioid Naive (hydromorphone))  CONTINUOUS,   Status:  Canceled       Comments:  Use the Numeric Rating Scale (...   \"And\" Linked Group Details    05/27/22 1837 05/27/22 1837  Pulse Ox  (LOW DOSE - Vulnerable Opioid Naive (hydromorphone))  CONTINUOUS       Comments:  Per policy for patients on opi...   \"And\" Linked Group Details    05/27/22 1837 05/27/22 1837  If patient difficult to arouse and/or has respiratory depression (respiratory rate of 10 or less), stop any opiates that are currently infusing and call a Rapid Response.  (LOW DOSE - Vulnerable Opioid Naive (hydromorphone))  CONTINUOUS,   Status:  Canceled       Comments:  If patient difficult to arouse...   \"And\" Linked Group Details    05/27/22 1837 05/27/22 1836  Pharmacy Consult Request ...Pain Management Review 1 Each  (LOW DOSE - Vulnerable Opioid Naive (hydromorphone))  PHARMACY TO DOSE,   Status:  Discontinued        \"And\" Linked Group Details    05/27/22 1837 05/27/22 1836  oxyCODONE immediate-release (ROXICODONE) tablet 2.5 mg  (LOW DOSE - Vulnerable Opioid Naive (hydromorphone))  EVERY 3 HOURS PRN        \"Or\" Linked Group Details    05/27/22 1837 05/27/22 1836  oxyCODONE immediate-release (ROXICODONE) tablet 5 mg  (LOW DOSE - Vulnerable Opioid Naive (hydromorphone))  EVERY 3 HOURS PRN        \"Or\" Linked Group Details    05/27/22 1837 05/27/22 1836  HYDROmorphone (Dilaudid) injection 0.25 mg  (LOW DOSE - Vulnerable Opioid Naive (hydromorphone))  EVERY 3 HOURS PRN        \"Or\" Linked Group Details    05/27/22 1837   05/27/22 1836  Notify Provider  ONCE       Comments:  Failed dysphagia screen    " "05/27/22 1837   05/27/22 1836  Insert IV  ONCE,   Status:  Canceled         05/27/22 1837   05/27/22 1836  Oxygen Per Guideline  CONTINUOUS,   Status:  Canceled         05/27/22 1837   05/27/22 1836  Full code  CONTINUOUS         05/27/22 1837   05/27/22 1836  Vital signs per nursing policy  ONCE         05/27/22 1837   05/27/22 1836  Diet Order Diet: Clear Liquid  ALL MEALS,   Status:  Canceled         05/27/22 1837   05/27/22 1836  Intake and Output (Monitor I&O) per Nursing Policy  ONCE       Comments:  (Med/Surg and Tele - Q12 hrs, ...    05/27/22 1837   05/27/22 1836  Fall Precautions  CONTINUOUS         05/27/22 1837   05/27/22 1836  Activity/Weight-bearing/Mobility Order- Strict Bed Rest  (Activity)  CONTINUOUS,   Status:  Canceled         05/27/22 1837   05/27/22 1835  polyethylene glycol/lytes (MIRALAX) PACKET 1 Packet  (Bowel Regimen)  1 TIME DAILY PRN        \"And\" Linked Group Details    05/27/22 1837   05/27/22 1835  magnesium hydroxide (MILK OF MAGNESIA) suspension 30 mL  (Bowel Regimen)  1 TIME DAILY PRN        \"And\" Linked Group Details    05/27/22 1837   05/27/22 1835  bisacodyl (DULCOLAX) suppository 10 mg  (Bowel Regimen)  1 TIME DAILY PRN        \"And\" Linked Group Details    05/27/22 1837   05/27/22 1817  Diet Order Diet: Clear Liquid  (Diet Order Panel)  ALL MEALS,   Status:  Canceled         05/27/22 1816   05/27/22 1815  HYDROmorphone (Dilaudid) injection 0.4 mg  ONCE,   Status:  Discontinued         05/27/22 1751   05/27/22 1808  ESTIMATED GFR  ONCE         05/27/22 1808   05/27/22 1808  DIFFERENTIAL MANUAL  ONCE         05/27/22 1808   05/27/22 1808  PERIPHERAL SMEAR REVIEW  ONCE         05/27/22 1808   05/27/22 1808  PATH INTERP HEME  ONCE         05/27/22 1808   05/27/22 1808  PLATELET ESTIMATE  ONCE         05/27/22 1808 05/27/22 1808  MORPHOLOGY  ONCE         05/27/22 1808   05/27/22 1756  APTT  ONCE         05/27/22 1755   05/27/22 1756  FIBRINOGEN  ONCE         05/27/22 1755 "   05/27/22 1756  IR-CONSULT AND TREAT  1 TIME IMAGING,   Status:  Canceled         05/27/22 1756   05/27/22 1755  Comp Metabolic Panel  ONCE         05/27/22 1755   05/27/22 1755  PHOSPHORUS  ONCE         05/27/22 1755   05/27/22 1755  Prothrombin Time  ONCE         05/27/22 1755   05/27/22 1754  CBC WITH DIFFERENTIAL  ONCE         05/27/22 1755   05/27/22 1754  LDH  ONCE         05/27/22 1755   05/27/22 1754  URIC ACID  ONCE         05/27/22 1755   05/27/22 1754  MAGNESIUM  ONCE         05/27/22 1755   05/27/22 1751  HYDROmorphone (Dilaudid) injection 0.2-0.4 mg  EVERY 4 HOURS PRN,   Status:  Discontinued         05/27/22 1752 05/27/22 1735  ADMISSION ORDER (INPATIENT)  ONCE       Comments:  When selecting transfer servic...    05/27/22 1734   05/27/22 1735  Vital signs per nursing policy  CONTINUOUS       Comments:  Med/Surg VS Q8 hrs, Tele VS Q4...    05/27/22 1734   05/27/22 1735  Notify hospitalist on call for direct admit/triage hospitalist arrivals when patient arrives  ONCE         05/27/22 1734   05/27/22 1735  Notify Provider  ONCE       Comments:  Blood sugar greater than 300 ...    05/27/22 1734   05/27/22 1735  Diet NPO Restrict to: Strict  ALL MEALS,   Status:  Canceled         05/27/22 1734   05/27/22 1735  Activity/Weight-bearing/Mobility Order- No Restrictions; Per Nursing Assessment ([facility] Only)  CONTINUOUS,   Status:  Canceled         05/27/22 1734   05/27/22 1735  Insert IV  ONCE,   Status:  Canceled         05/27/22 1734   05/27/22 1735  Oxygen Per Guideline  CONTINUOUS,   Status:  Canceled         05/27/22 1734   05/27/22 1735  Full code  CONTINUOUS,   Status:  Canceled         05/27/22 1734   05/27/22 0600  NS infusion  CONTINUOUS,   Status:  Discontinued         05/27/22 2156   Unscheduled  Change dressing  PRN       05/28/22 0931   Unscheduled  Use the numeric rating scale (NRS-11) on regular floors and Critical-Care Pain Observation Tool (CPOT) on ICUs/Trauma to assess pain  PRN      Comments:  Use the numeric rating scale (...    05/28/22 0931   Unscheduled  Setup Equipment and Items at Bedside Prior to Procedure (coordinate with RT)  PRN     Comments:  Have the following items and e...    05/30/22 0833   Unscheduled  Vital signs per blood transfusion policy  (Emergency Department or Inpatient Room)  PRN     Comments:  Baseline and interval vital si...    06/02/22 1517   Pending  Renal Function Panel  TOMORROW AM (LAB)         Pending        Admission Information    Attending Provider Admission Dx Admitted on   River Rodriguez M.D. 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   Service Isolation Code Status   PEDIATRICS Protective Full Code   Allergies Advance Care Planning    Amoxicillin Jump to the Activity       Reprint Order Requisition    Discharge Instructions (Order #[id number]) on 5/31/22     Order Detail Report    Discharge Instructions (Order #[id number]) on 5/31/22     Tracking Links    Cosign Tracking Order Transmittal Tracking            Activity:      NA           Diet::          NA           Medication:  Yes                          As instructed by  and meds to beds    Equipment:  Yes    Treatment:  NA      Other:          NA    Education Class:      Patient/Family verbalized/demonstrated understanding of above Instructions:  yes  __________________________________________________________________________    OBJECTIVE CHECKLIST  Patient/Family has:    All medications brought from home   Yes  Valuables from safe                            Yes  Prescriptions                                       Yes  All personal belongings                       Yes  Equipment (oxygen, apnea monitor, wheelchair)     Yes. Swab caps, clave connectors, ns flushes  Other:     ___________________________________________________________________________  Instructed On:    Car/booster seat:  Rear facing until 1 year old and 20 lbs                NA  45' angle rear facing/90' angle  forward facing    NA  Child secure in seat (harness tight)                    NA  Car seat secure in vehicle (1 inch rule)              NA  C for correct, O for oops                                     NA  Registration card/[email or web address] Sticker                     MARIAELENA  For information on free car seat safety inspections, please call [facility] at [phone]  __________________________________________________________________________  Discharge Survey Information  You may be receiving a survey from [facility].  Our goal is to provide the best patient care in the nation.  With your input, we can achieve this goal.    Which Discharge Education Sheets Provided:     Rehabilitation Follow-up:     Special Needs on Discharge (Specify)       Type of Discharge: Order  Mode of Discharge:  wheelchair  Method of Transportation:Private Car  Destination:  home  Transfer:  Referral Form:   No  Agency/Organization:  Accompanied by:  Specify relationship under 18 years of age) mom    Discharge date:  6/3/2022    7:20 AM    Depression / Suicide Risk    As you are discharged from this [facility], it is important to learn how to keep safe from harming yourself.    Recognize the warning signs:  Abrupt changes in personality, positive or negative- including increase in energy   Giving away possessions  Change in eating patterns- significant weight changes-  positive or negative  Change in sleeping patterns- unable to sleep or sleeping all the time   Unwillingness or inability to communicate  Depression  Unusual sadness, discouragement and loneliness  Talk of wanting to die  Neglect of personal appearance   Rebelliousness- reckless behavior  Withdrawal from people/activities they love  Confusion- inability to concentrate     If you or a loved one observes any of these behaviors or has concerns about self-harm, here's what you can do:  Talk about it- your feelings and reasons for harming yourself  Remove any means that  you might use to hurt yourself (examples: pills, rope, extension cords, firearm)  Get professional help from the community (Mental Health, Substance Abuse, psychological counseling)  Do not be alone:Call your Safe Contact- someone whom you trust who will be there for you.  Call your local CRISIS HOTLINE 192-2146 or 472-702-6675  Call your local Children's Mobile Crisis Response Team [place] ([phone] 023-2752 or www.Ring[employer]  Call the toll free National Suicide Prevention Hotlines   National Suicide Prevention Lifeline 936-553-QFOU (3388)  National Hope Line Network 800-SUICIDE (892-7232)

## 2022-06-03 NOTE — ASSESSMENT & PLAN NOTE
6/3/07318 0- high myopia. Has old glasses and under monocular testing vision improves near baseline. No apparent retinal abnormality

## 2022-06-03 NOTE — CARE PLAN
Problem: Discharge Barriers/Planning  Goal: Patient's continuum of care needs are met  Outcome: Not Progressing. MRI today. Eye patch left eye    The patient is Stable - Low risk of patient condition declining or worsening    Shift Goals  Clinical Goals: Rest, PLT transfusion, Pain control  Patient Goals: Rest  Family Goals: Keep family updated on POC    Progress made toward(s) clinical / shift goals:  as above    Patient is not progressing towards the following goals:        Problem: Optimal Care of the Patient with VTE  Goal: Peripheral tissue perfusion will improve  Outcome: Progressing   RLE swelling improving. On Eliquis.

## 2022-06-03 NOTE — FACE TO FACE
Face to Face Note  -  Durable Medical Equipment    River Rodriguez M.D. - NPI: [id number]  I certify that this patient is under my care and that they had a durable medical equipment(DME)face to face encounter by myself that meets the physician DME face-to-face encounter requirements with this patient on:    Date of encounter:   Patient:                    MRN:                       YOB: 2021  Tomas Jean-Baptiste  [id number]  2000     The encounter with the patient was in whole, or in part, for the following medical condition, which is the primary reason for durable medical equipment:  Other - Acute lymphoblastic leukemia    I certify that, based on my findings, the following durable medical equipment is medically necessary:  Other DME Equipment - Claves and caps for PICC line.    My Clinical findings support the need for the above equipment due to:  Other - Central line

## 2022-06-03 NOTE — PROGRESS NOTES
Pt demonstrates ability to turn self in bed without assistance of staff. Patient and family understands importance in prevention of skin breakdown, ulcers, and potential infection. Hourly rounding in effect. RN skin check complete.   Devices in place include: PICC, IV, cardiac leads, pulse ox, BP cuff,   Skin assessed under devices: Yes.  Confirmed HAPI identified on the following date: N/A   Location of HAPI: N/A.  Wound Care RN following: No.  The following interventions are in place: Pillows in place for support and positioning.

## 2022-06-03 NOTE — PROGRESS NOTES
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  He has recommended intermittent patching of the left eye and I have ordered an orbital MRI.    JULY is understandably disappointed by this new development and treatment implications (see below) - in particular, that his discharge has been postponed by at least another day.    He otherwise voices no complaints.    Review of Systems:     Constitutional: Afebrile, \"OK\" appetite.  HENT: Negative for nosebleeds, congestion, rhinorrhea, sore throat, mouth sores.  Eyes: New eye patch.  Respiratory: Negative for shortness of breath or cough.   Cardiovascular: Negative for chest pain.    Gastrointestinal: BM x 3 yesterday (\"a little bit constipated\")  Skin: Negative for rash or skin infection.  Neurological: Negative for numbness, tingling, sensory changes, focal weakness or headaches.    Psychiatric/Behavioral: Sad and frustrated at times, but optimistic.     Medications:    Current Facility-Administered Medications:   •  [START ON 2022] imatinib (GLEEVEC) tablet 400 mg, 400 mg, Oral, DAILY, River Rodriguez M.D.  •  imatinib (GLEEVEC) tablet 200 mg, 200 mg, Oral, DAILY, River Rodriguez M.D.  •  pseudoephedrine (SUDAFED) " tablet 60 mg, 60 mg, Oral, Q6HRS PRN, River Rodriguez M.D., 60 mg at 06/03/22 0741  •  apixaban (ELIQUIS) tablet 10 mg, 10 mg, Oral, BID, 10 mg at 06/03/22 0548 **FOLLOWED BY** [START ON 6/7/2022] apixaban (ELIQUIS) tablet 5 mg, 5 mg, Oral, BID, River Rodriguez M.D.  •  allopurinol (ZYLOPRIM) tablet 200 mg, 200 mg, Oral, Q8HRS, River Rodriguez M.D., 200 mg at 06/03/22 0547  •  lidocaine-prilocaine (EMLA) 2.5-2.5 % cream 1 Application, 1 Application, Topical, PRN, Lia Hernandez D.O., 1 Application at 05/30/22 1324  •  [START ON 6/4/2022] sulfamethoxazole-trimethoprim (BACTRIM) 400-80 MG per tablet 2 Tablet, 2 Tablet, Oral, 2 times per day on Sun Sat, Pepe Faye M.D.  •  famotidine (PEPCID) tablet 20 mg, 20 mg, Oral, BID, Pepe Faye M.D., 20 mg at 06/03/22 0547  •  predniSONE (DELTASONE) tablet 60 mg, 60 mg, Oral, BID, Pepe Faye M.D., 60 mg at 06/03/22 0546  •  LORazepam (ATIVAN) injection 2 mg, 2 mg, Intravenous, Q6HRS PRN, Pepe Faye M.D., 2 mg at 05/31/22 1529  •  promethazine (PHENERGAN) tablet 25 mg, 25 mg, Oral, Q6HRS PRN, Pepe Faye M.D.  •  ondansetron (ZOFRAN) syringe/vial injection 8 mg, 8 mg, Intravenous, Q8HRS PRN, Pepe Faye M.D.  •  heparin lock flush 100 unit/mL injection 200 Units, 200 Units, Intravenous, Q6HR, NICOLAS Lan, 200 Units at 06/03/22 0358  •  NS infusion, , Intravenous, Continuous, River Rodriguez M.D., Last Rate: 75 mL/hr at 06/03/22 0803, Rate Change at 06/03/22 0803  •  acetaminophen (Tylenol) tablet 650 mg, 650 mg, Oral, Q4HRS PRN, MANDA McclureP.R.N., 650 mg at 06/02/22 0839  •  sodium chloride (OCEAN) 0.65 % nasal spray 2 Spray, 2 Spray, Nasal, Q2HRS PRN, MANDA McclureP.R.N.  •  senna-docusate (PERICOLACE or SENOKOT S) 8.6-50 MG per tablet 2 Tablet, 2 Tablet, Oral, BID, 2 Tablet at 06/02/22 0604 **AND** polyethylene glycol/lytes (MIRALAX) PACKET 1 Packet, 1 Packet, Oral, QDAY PRN, 1 Packet at 06/01/22 0619  "**AND** magnesium hydroxide (MILK OF MAGNESIA) suspension 30 mL, 30 mL, Oral, QDAY PRN **AND** bisacodyl (DULCOLAX) suppository 10 mg, 10 mg, Rectal, QDAY PRN, Margarito Hayward M.D.  •  oxyCODONE immediate-release (ROXICODONE) tablet 2.5 mg, 2.5 mg, Oral, Q3HRS PRN, 2.5 mg at 22 0023 **OR** oxyCODONE immediate-release (ROXICODONE) tablet 5 mg, 5 mg, Oral, Q3HRS PRN, 5 mg at 22 0924 **OR** HYDROmorphone (Dilaudid) injection 0.25 mg, 0.25 mg, Intravenous, Q3HRS PRN, Mragarito Hayward M.D.  •  hydrALAZINE (APRESOLINE) injection 10 mg, 10 mg, Intravenous, Q4HRS PRN, Margarito Hayward M.D.    OBJECTIVE:     Max Temp: Temp (24hrs), Av.1 °C (96.9 °F), Min:35.7 °C (96.3 °F), Max:36.6 °C (97.8 °F)      Vitals: BP (!) 140/59   Pulse 90   Temp (!) 35.7 °C (96.3 °F) (Temporal)   Resp 15   Ht 1.651 m (5' 5\")   Wt 75.2 kg (165 lb 12.6 oz)   SpO2 98%   BMI 27.59 kg/m²       Intake/Output Summary (Last 24 hours) at 6/3/2022 1009  Last data filed at 6/3/2022 0800  Gross per 24 hour   Intake 2858 ml   Output 3300 ml   Net -442 ml       Today's weight is 71.8 kg, down 3.4 kg from yesterday. (Weight was 74.4 kg on Hospital Day 2).    Labs:   Latest Reference Range & Units 22 04:52 22 02:47   WBC 4.8 - 10.8 K/uL 4.1 (L) 1.3 (LL) [1]   Hemoglobin 14.0 - 18.0 g/dL 8.3 (L) 7.5 (L)   Hematocrit 42.0 - 52.0 % 25.1 (L) 22.4 (L)   Platelet Count 164 - 446 K/uL 35 (L) 49 (L)   Neutrophils-Polys 44.00 - 72.00 % 14.10 (L) 14.30 (L)   Neutrophils (Absolute) 1.82 - 7.42 K/uL 0.58 (L) [2] 0.19 (LL) [3]   Lymphocytes 22.00 - 41.00 % 24.80 44.60 (H)   Monocytes 0.00 - 13.40 % 0.00 0.90   Myelocytes %  1.80   Blasts % 61.10 (HH) 38.40 (HH)      [facility] Reference Range & Units 22 21:11   Sodium 135 - 145 mmol/L 138   Potassium 3.6 - 5.5 mmol/L 3.8   Chloride 96 - 112 mmol/L 104   Co2 20 - 33 mmol/L 24   Anion Gap 7.0 - 16.0  10.0   Glucose 65 - 99 mg/dL 174 (H)   Bun 8 - 22 mg/dL 16   Creatinine 0.50 - 1.40 mg/dL 0.56 "   GFR (CKD-EPI) >60 mL/min/1.73 m 2 144 [1]   Calcium 8.5 - 10.5 mg/dL 8.1 (L)   Phosphorus 2.5 - 4.5 mg/dL 3.0   Uric Acid 2.5 - 8.3 mg/dL 2.2 (L)      Physical Exam:    Constitutional: Fatigued, slightly sad affect.   HENT: Normocephalic and atraumatic.  No rhinorrhea. Oropharynx is clear and moist.   Eyes: Left eye patched. No scleral icterus.   Neck: Supple, no adenopathy.    Cardiovascular: RRR w/o murmur  Pulmonary/Chest: Effort normal. Symmetric expansion.  Clear to auscultation bilaterally.  Abdomen: Soft. Bowel sounds are normal. No distension, organomegaly or mass.      Skin: Skin is warm, dry and pink.  No rash or evidence of skin infection.    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     1) Precursor B-Cell Acute Lymphoblastic Leukemia:              - 2-3 weeks of symptoms              - Presenting WBC > 440 k/uL, hyperleukocytosis              - Start of Hydroxyurea (1500 mg PO Q8) 2320 on 5/27/2022  - discontinued on May 30 (55 hours of hydroxyurea < 72h allowed)              - Leukapharesis for hyperleukocytosis and risk for leukostasis - pheresed yesterday AM - post pharesis WBC count 71.3 k/uL              - No steroid pretreatment              - Peripheral flow cytometry remarkable for very large population of CD10 pos, CD19 pos, CD 20 negative lymphoblasts (communicated from Dr. Darden - Hematopathology 5/29/2022)               - CSF negative - but now with demonstrable cranial nerve palsy(ies)              - Testicular status NEGATIVE                     - Meets with all criteria for [id number]              - [id number] offered to patient.  Risks and benefits of study discussed in detail (see separate consent notes).  All questions answered.  Consent for [id number] signed.  Patient enrolled ON STUDY [id number].  Biobanking samples to be obtained at time of bone marrow biopsy and  aspirate today                 - Given the following 1) Enrollment on [id number] 2) Patient meeting with all criteria for enrollment on [facility] [place] [place] [id number] 3) and Patient not meeting with any exclusion criteria for enrollment on [facility] [place] [place] [id number],  treatment ON STUDY [facility] [place] [place] [id number] offered to patient.  Patient HR by age alone.  The patient and his mother were given an opportunity to ask questions and all questions were answered before Tomas signed consent for enrollment ON STUDY [id number].                - New finding of Ph1 positivity makes JULY eligible for [id number]. Dr Faye has reviewed this protocol with JULY, who has signed consent and is now enrolled on that protocol (so, no longer enrolled on [id number]).              - Per protocol, we can start treatment with imatinib even before Day 15.  I have already provided UJLY with some written information about the drug and Dr Faye is here this morning to review this in more detail with JULY and his mother.  We plan to start imatinib this evening.                                        High Risk Acute B-Lymphoblastic Leukemia, as per [id number], Induction, Day 3                          ** Cytarabine 70 mg IT x1 dose on Day 1 (see separate procedure note)                          ** Vincristine 2 mg IV on Days 1 (COMPLETE), 8, 15 and 22                          ** Daunorubicin 47.5 mg IV on Days 1 (COMPLETE), 8, 15 and 22                          ** PEG Aspargase 2500 IU/m2/dose on Day 4 (will postpone until tomorrow)                          ** Prednisone 60 mg PO BID, Day 5 of 28    ** Starting imatinib today (Day 5): 400 mg po qAM, 200 mg po qPM                          ** Methotrexate IT on Days 8 and 29                          ** Additional Methotrexate IT on Days 15 and 22 due to CNS 3c status     2) Disease-related Pancytopenia:              - CBC as noted; ANC now < 500              - Hgb dropping              - Transfuse pRBCs today in anticipation of discharge  "tomorrow              - Transfuse for platelets < 10K or symptomatic               - Neutropenic Precautions                 3) Fever and Neutropenia:              - Febrile to 38.5C on 5/28/2022 at 0945 -no fever since              - Blood cultures drawn x 2 - no growth              - D/Terrence cefepime on June 1.              - Monitor clinical status, can broaden coverage if medically indicated     4) 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:              - On presentation Uric Acid 15.6, LDH 1114              - High tumor burden initially; WBC now down              - S/P Rasburicase x 1 dose              - Allopurinol 200 mg po tid               - Hydration of NS at 100 mL/h               - Lab frequency decreased     5) Hypercoagulability / Superficial Right LE Thrombus / Subsegmental PE:              - Anticoagulation held for lumbar puncture yesterday              - Restarted heparin drip following procedure targeting therapeutic anti-Xa              - Platelets transfused allow for hemostasis during procedure, 53K prior to procedure              - Transitioned to apixaban on May 31 for anticoagulation through induction, likely continue to complete a 3-month course     6) Vision issues:              - JULY has had varying visual complaints for several days: first it was \"dry eyes,\" later blurry vision, and (since May 30) intermittent diplopia   - We have been attributing these symptoms to various causes, but now Dr Carranza has documented a partial right CN VI palsy   - The time of symptom onset essentially overlaps the start of induction chemo, but would imply that CNS status is CNS 3c (symptomatic cranial nerve involvement), NOT CNS 1     7) Epistaxis:              - Intermittent and mild              - JULY reports that nosebleeds are a long-standing issue              - Platelets up after transfusion yesterday     8) \"Muffled\" hearing:              - Ear canals are clear (JULY had suspected ear wax was the issue)         "      -Trial of Sudafed: ineffective              - Likely CN VIII symptom? (especially in view on CNS 3c status)     9) Hyperglycemia:              - Fairly stable/acceptable              - Prednisone side effect              - May worsen after PEG-asp; will monitor     Disposition:  Inpatient for treatment of Precursor B-Cell Acute Lymphoblastic Leukemia.  Will continue standard 4-drug induction as per [id number] but planning switch to [id number] on Day 15 (adding imatinib today, which is allowed on that protocol).    Potential discharge tomorrow; outpatient Rx's completed.      Discussed with nursing staff, patient's mother, Dr. Faye.  Total time today approx 60 minutes.    ANN MARIE Rodriguez MD  Pediatric Hematology / Oncology  [facility]  Cell. [phone]  Office. [phone]

## 2022-06-03 NOTE — CONSULTS
"Peds/Neuro Ophthalmology:   Ephraim Carranza M.D.    Date & Time note created:    6/3/2022   8:46 AM     Referring MD / APRN:  Margarito Arvizu M.D., NANDINI Juraez*    Patient ID:  Name:             Tomas Jean-Baptiste   YOB: 2000  Age:                 21 y.o.  male   MRN:               [id number]    Chief Complaint/Reason for Visit:     Blurry and double vision    History of Present Illness:    Tomas Jean-Baptiste is a 21 y.o. male   Tomas Roy \"JULY\" Estiven is a 21 y.o. Male with new diagnosis of [place]-chromosome-positive pre-B-cell ALL undergoing induction chemotherapy who is being followed in the PICU for high disease burden at onset placing him at risk of tumor lysis syndrome and acute thrombosis, he currently has a superficial blood clot of RLE and subsegmental pulmonary embolism. He is also being treated for fever with neutropenia, but has been afebrile >48 hours with no nidus of infection. He noted on 6/1 as t having the new onset of blurry and double vision      Review of Systems:  Review of Systems   Eyes: Positive for blurred vision and double vision. Negative for photophobia, pain, discharge and redness.       Past Medical History:   Myopia     Past Surgical History:  No past surgical history on file.    Current Outpatient Medications:  Current Facility-Administered Medications   Medication Dose Route Frequency Provider Last Rate Last Admin   • pseudoephedrine (SUDAFED) tablet 60 mg  60 mg Oral Q6HRS PRN River Rodriguez M.D.   60 mg at 06/03/22 0741   • apixaban (ELIQUIS) tablet 10 mg  10 mg Oral BID River Rodriguez M.D.   10 mg at 06/03/22 0548    Followed by   • [START ON 6/7/2022] apixaban (ELIQUIS) tablet 5 mg  5 mg Oral BID River Rodriguez M.D.       • allopurinol (ZYLOPRIM) tablet 200 mg  200 mg Oral Q8HRS River Rodriguez M.D.   200 mg at 06/03/22 0547   • lidocaine-prilocaine (EMLA) 2.5-2.5 % cream 1 " Application  1 Application Topical PRN Lia Hernandez D.O.   1 Application at 05/30/22 1324   • [START ON 6/4/2022] sulfamethoxazole-trimethoprim (BACTRIM) 400-80 MG per tablet 2 Tablet  2 Tablet Oral 2 times per day on Sun Sat Pepe Faye M.D.       • famotidine (PEPCID) tablet 20 mg  20 mg Oral BID Pepe Faye M.D.   20 mg at 06/03/22 0547   • predniSONE (DELTASONE) tablet 60 mg  60 mg Oral BID Pepe Faye M.D.   60 mg at 06/03/22 0546   • LORazepam (ATIVAN) injection 2 mg  2 mg Intravenous Q6HRS PRN Pepe Faye M.D.   2 mg at 05/31/22 1529   • promethazine (PHENERGAN) tablet 25 mg  25 mg Oral Q6HRS PRN Pepe Faye M.D.       • ondansetron (ZOFRAN) syringe/vial injection 8 mg  8 mg Intravenous Q8HRS PRN Pepe Faye M.D.       • heparin lock flush 100 unit/mL injection 200 Units  200 Units Intravenous Q6HR Mati Wu A.P.NReid   200 Units at 06/03/22 0358   • NS infusion   Intravenous Continuous River Rodriguez M.D. 75 mL/hr at 06/03/22 0803 Rate Change at 06/03/22 0803   • acetaminophen (Tylenol) tablet 650 mg  650 mg Oral Q4HRS PRN Destini Jackson A.P.R.N.   650 mg at 06/02/22 0839   • sodium chloride (OCEAN) 0.65 % nasal spray 2 Spray  2 Spray Nasal Q2HRS PRN Destini Jackson A.P.R.N.       • senna-docusate (PERICOLACE or SENOKOT S) 8.6-50 MG per tablet 2 Tablet  2 Tablet Oral BID Margarito Hayward M.D.   2 Tablet at 06/02/22 0604    And   • polyethylene glycol/lytes (MIRALAX) PACKET 1 Packet  1 Packet Oral QDAY PRN Margarito Hayward M.D.   1 Packet at 06/01/22 0619    And   • magnesium hydroxide (MILK OF MAGNESIA) suspension 30 mL  30 mL Oral QDAY PRN Margarito Hayward M.D.        And   • bisacodyl (DULCOLAX) suppository 10 mg  10 mg Rectal QDAY PRN Margarito Hayward M.D.       • oxyCODONE immediate-release (ROXICODONE) tablet 2.5 mg  2.5 mg Oral Q3HRS PRN Margarito Hayward M.D.   2.5 mg at 05/31/22 0023    Or   • oxyCODONE immediate-release (ROXICODONE) tablet 5 mg  5 mg Oral Q3HRS PRRUTHIE Pimentel  MARILYN Hayward M.D.   5 mg at 05/29/22 0924    Or   • HYDROmorphone (Dilaudid) injection 0.25 mg  0.25 mg Intravenous Q3HRS PRN Margarito Hayward M.D.       • hydrALAZINE (APRESOLINE) injection 10 mg  10 mg Intravenous Q4HRS PRN Margarito Hayward M.D.           Allergies:  Allergies   Allergen Reactions   • Amoxicillin Rash     Reacted as an infant. No swelling or airway problems.       Family History:  Family History   Problem Relation Age of Onset   • No Known Problems Mother    • Diabetes Paternal Grandfather    • Hypertension Paternal Grandfather    • Hyperlipidemia Paternal Grandfather    • Cancer Neg Hx    • Heart Disease Neg Hx    • Stroke Neg Hx        Social History:  Social History     Socioeconomic History   • Marital status: Single     Spouse name: Not on file   • Number of children: Not on file   • Years of education: Not on file   • Highest education level: Not on file   Occupational History   • Not on file   Tobacco Use   • Smoking status: Never Smoker   • Smokeless tobacco: Never Used   Vaping Use   • Vaping Use: Never used   Substance and Sexual Activity   • Alcohol use: Never   • Drug use: Never   • Sexual activity: Not Currently   Other Topics Concern   • Behavioral problems Not Asked   • Interpersonal relationships Not Asked   • Sad or not enjoying activities Not Asked   • Suicidal thoughts Not Asked   • Poor school performance Not Asked   • Reading difficulties Not Asked   • Speech difficulties Not Asked   • Writing difficulties Not Asked   • Inadequate sleep Not Asked   • Excessive TV viewing Not Asked   • Excessive video game use Not Asked   • Inadequate exercise Not Asked   • Sports related Not Asked   • Poor diet Not Asked   • Family concerns for drug/alcohol abuse Not Asked   • Poor oral hygiene Not Asked   • Bike safety Not Asked   • Family concerns vehicle safety Not Asked   Social History Narrative   • Not on file     Social Determinants of Health     Financial Resource Strain: Not on file  "  Food Insecurity: Not on file   Transportation Needs: Not on file   Physical Activity: Not on file   Stress: Not on file   Social Connections: Not on file   Intimate Partner Violence: Not on file   Housing Stability: Not on file          Physical Exam:  Physical Exam    Oriented x 3  Weight/BMI: Body mass index is 27.59 kg/m².  BP (!) 140/59   Pulse 90   Temp (!) 35.7 °C (96.3 °F) (Temporal)   Resp 15   Ht 1.651 m (5' 5\")   Wt 75.2 kg (165 lb 12.6 oz)   SpO2 98%     Base Eye Exam     Visual Acuity (Snellen - Linear)       Right Left    Dist cc 20/30 20/30          Tonometry (8:40 AM)       Right Left    Pressure soft soft          Pupils       Pupils    Right PERRL    Left PERRL          Visual Fields       Right Left     Full Full          Neuro/Psych     Oriented x3: Yes    Mood/Affect: Normal          Dilation     Both eyes: able to view withotu dilation @ 8:40 AM            Additional Tests     Color       Right Left    Ishihara 9/9 9/9            Strabismus Exam       0 0 0   0 0 0                       0  0  ET  0  -2                       0 0 0   0 0 0                   Slit Lamp and Fundus Exam     External Exam       Right Left    External Normal Normal          Slit Lamp Exam       Right Left    Lids/Lashes Normal Normal    Conjunctiva/Sclera White and quiet White and quiet    Cornea Clear Clear    Anterior Chamber Deep and quiet Deep and quiet    Iris Round and reactive Round and reactive    Lens Clear Clear    Vitreous Normal Normal          Fundus Exam       Right Left    Disc Normal Normal    C/D Ratio 0.1 0.1    Macula Normal Normal    Vessels Normal Normal    Periphery Normal Normal                Pertinent Lab/Test/Imaging Review:      Assessment and Plan:     Myopia of both eyes  Assessment & Plan  6/3/83437 0- high myopia. Has old glasses and under monocular testing vision improves near baseline. No apparent retinal abnormality    Left abducens nerve palsy  Assessment & Plan  6/3/2022 - " 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 Blurry vision secondary to asthenopia from overlapping images and acuity improves under monocular conditions. 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 Less likely infectious process given no papilledema, headaches or other meningeal signs. Could also be some form of ischemic process if BP fluctuations during induction. Recommend MRI orbits with citlali and repeat LP. Recommend that he could wear eye patch.         Ephraim Carranza M.D.

## 2022-06-03 NOTE — PROGRESS NOTES
Lab called with critical result of WBC:1.3  at 0320. Critical lab result read back to lab.   Dr. Rodriguez notified of critical lab result at 0321.  Critical lab result read back by Dr. Rodriguez.

## 2022-06-03 NOTE — ASSESSMENT & PLAN NOTE
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 Blurry vision secondary to asthenopia from overlapping images and acuity improves under monocular conditions. 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 Less likely infectious process given no papilledema, headaches or other meningeal signs. Could also be some form of ischemic process if BP fluctuations during induction. Recommend MRI orbits with citlali and repeat LP. Recommend that he could wear eye patch.

## 2022-06-03 NOTE — THERAPY
Missed Therapy     Patient Name: Tomas Jean-Baptiste  Age:  21 y.o., Sex:  male  Medical Record #: [id number]  Today's Date: 6/3/2022    PT tx attempted x2. This am pt with MD this am, this afternoon pt receiving blood transfusion upon 2nd attempt. Per nsg, pt with improved balance with eye patch. Was able to amb to bathroom and reach down to floor in the shower. Pt and RN feel that he does not need AD at this time given improvement in balance. PT to re-attempt if pt remains in acute setting anover the weekend.    Odette Montes, PT, DPT  Ext. [phone]

## 2022-06-03 NOTE — CARE PLAN
Problem: Nutritional:  Goal: Achieve adequate nutritional intake  Description: Patient will consume >50-75% of meals  Outcome: Progressing     Per nursing, he seems to be eating well.  Nursing reported they will alert us if PO drops or with specific food preferences.

## 2022-06-04 ENCOUNTER — PHARMACY VISIT (OUTPATIENT)
Dept: PHARMACY | Facility: MEDICAL CENTER | Age: 22
End: 2022-06-04
Payer: COMMERCIAL

## 2022-06-04 ENCOUNTER — HOSPITAL ENCOUNTER (OUTPATIENT)
Dept: INFUSION CENTER | Facility: MEDICAL CENTER | Age: 22
End: 2022-06-04
Attending: PEDIATRICS
Payer: COMMERCIAL

## 2022-06-04 VITALS
WEIGHT: 158.29 LBS | SYSTOLIC BLOOD PRESSURE: 137 MMHG | HEIGHT: 65 IN | TEMPERATURE: 96.4 F | HEART RATE: 104 BPM | OXYGEN SATURATION: 100 % | BODY MASS INDEX: 26.37 KG/M2 | RESPIRATION RATE: 18 BRPM | DIASTOLIC BLOOD PRESSURE: 65 MMHG

## 2022-06-04 VITALS
TEMPERATURE: 96.6 F | HEIGHT: 65 IN | RESPIRATION RATE: 18 BRPM | OXYGEN SATURATION: 98 % | BODY MASS INDEX: 26.37 KG/M2 | HEART RATE: 98 BPM | SYSTOLIC BLOOD PRESSURE: 105 MMHG | WEIGHT: 158.29 LBS | DIASTOLIC BLOOD PRESSURE: 60 MMHG

## 2022-06-04 DIAGNOSIS — C91.00 PRE B-CELL ACUTE LYMPHOBLASTIC LEUKEMIA (ALL) (HCC): ICD-10-CM

## 2022-06-04 LAB
ANION GAP SERPL CALC-SCNC: 11 MMOL/L (ref 7–16)
ANISOCYTOSIS BLD QL SMEAR: ABNORMAL
BASOPHILS # BLD AUTO: 0 % (ref 0–1.8)
BASOPHILS # BLD: 0 K/UL (ref 0–0.12)
BLASTS NFR BLD MANUAL: 4.5 %
BUN SERPL-MCNC: 16 MG/DL (ref 8–22)
CALCIUM SERPL-MCNC: 8.4 MG/DL (ref 8.5–10.5)
CHLORIDE SERPL-SCNC: 105 MMOL/L (ref 96–112)
CO2 SERPL-SCNC: 21 MMOL/L (ref 20–33)
CREAT SERPL-MCNC: 0.43 MG/DL (ref 0.5–1.4)
EOSINOPHIL # BLD AUTO: 0 K/UL (ref 0–0.51)
EOSINOPHIL NFR BLD: 0 % (ref 0–6.9)
ERYTHROCYTE [DISTWIDTH] IN BLOOD BY AUTOMATED COUNT: 47.6 FL (ref 35.9–50)
GFR SERPLBLD CREATININE-BSD FMLA CKD-EPI: 156 ML/MIN/1.73 M 2
GLUCOSE SERPL-MCNC: 143 MG/DL (ref 65–99)
HCT VFR BLD AUTO: 26.5 % (ref 42–52)
HGB BLD-MCNC: 9 G/DL (ref 14–18)
LYMPHOCYTES # BLD AUTO: 1.12 K/UL (ref 1–4.8)
LYMPHOCYTES NFR BLD: 80.2 % (ref 22–41)
MANUAL DIFF BLD: ABNORMAL
MCH RBC QN AUTO: 29.8 PG (ref 27–33)
MCHC RBC AUTO-ENTMCNC: 34 G/DL (ref 33.7–35.3)
MCV RBC AUTO: 87.7 FL (ref 81.4–97.8)
MICROCYTES BLD QL SMEAR: ABNORMAL
MONOCYTES # BLD AUTO: 0.01 K/UL (ref 0–0.85)
MONOCYTES NFR BLD AUTO: 0.9 % (ref 0–13.4)
MORPHOLOGY BLD-IMP: NORMAL
NEUTROPHILS # BLD AUTO: 0.2 K/UL (ref 1.82–7.42)
NEUTROPHILS NFR BLD: 14.4 % (ref 44–72)
NRBC # BLD AUTO: 0 K/UL
NRBC BLD-RTO: 0 /100 WBC
PLATELET # BLD AUTO: 40 K/UL (ref 164–446)
PLATELET BLD QL SMEAR: NORMAL
PMV BLD AUTO: 9.1 FL (ref 9–12.9)
POTASSIUM SERPL-SCNC: 4.6 MMOL/L (ref 3.6–5.5)
RBC # BLD AUTO: 3.02 M/UL (ref 4.7–6.1)
RBC BLD AUTO: PRESENT
SODIUM SERPL-SCNC: 137 MMOL/L (ref 135–145)
WBC # BLD AUTO: 1.4 K/UL (ref 4.8–10.8)

## 2022-06-04 PROCEDURE — 96413 CHEMO IV INFUSION 1 HR: CPT

## 2022-06-04 PROCEDURE — 700111 HCHG RX REV CODE 636 W/ 250 OVERRIDE (IP): Performed by: PEDIATRICS

## 2022-06-04 PROCEDURE — 80048 BASIC METABOLIC PNL TOTAL CA: CPT

## 2022-06-04 PROCEDURE — 700111 HCHG RX REV CODE 636 W/ 250 OVERRIDE (IP)

## 2022-06-04 PROCEDURE — RXMED WILLOW AMBULATORY MEDICATION CHARGE: Performed by: PEDIATRICS

## 2022-06-04 PROCEDURE — 96375 TX/PRO/DX INJ NEW DRUG ADDON: CPT

## 2022-06-04 PROCEDURE — 700105 HCHG RX REV CODE 258: Performed by: PEDIATRICS

## 2022-06-04 PROCEDURE — 99238 HOSP IP/OBS DSCHRG MGMT 30/<: CPT | Performed by: PEDIATRICS

## 2022-06-04 PROCEDURE — 700105 HCHG RX REV CODE 258

## 2022-06-04 PROCEDURE — A9270 NON-COVERED ITEM OR SERVICE: HCPCS | Performed by: PEDIATRICS

## 2022-06-04 PROCEDURE — 700102 HCHG RX REV CODE 250 W/ 637 OVERRIDE(OP): Performed by: INTERNAL MEDICINE

## 2022-06-04 PROCEDURE — 85025 COMPLETE CBC W/AUTO DIFF WBC: CPT

## 2022-06-04 PROCEDURE — 700102 HCHG RX REV CODE 250 W/ 637 OVERRIDE(OP): Performed by: PEDIATRICS

## 2022-06-04 PROCEDURE — 85007 BL SMEAR W/DIFF WBC COUNT: CPT

## 2022-06-04 PROCEDURE — 306780 HCHG STAT FOR TRANSFUSION PER CASE

## 2022-06-04 PROCEDURE — A9270 NON-COVERED ITEM OR SERVICE: HCPCS | Performed by: INTERNAL MEDICINE

## 2022-06-04 PROCEDURE — 96415 CHEMO IV INFUSION ADDL HR: CPT

## 2022-06-04 RX ORDER — EPINEPHRINE 1 MG/ML(1)
0.5 AMPUL (ML) INJECTION PRN
Status: DISCONTINUED | OUTPATIENT
Start: 2022-06-04 | End: 2022-06-05 | Stop reason: HOSPADM

## 2022-06-04 RX ORDER — DIPHENHYDRAMINE HYDROCHLORIDE 50 MG/ML
50 INJECTION INTRAMUSCULAR; INTRAVENOUS ONCE
Status: COMPLETED | OUTPATIENT
Start: 2022-06-04 | End: 2022-06-04

## 2022-06-04 RX ORDER — SODIUM CHLORIDE 9 MG/ML
500 INJECTION, SOLUTION INTRAVENOUS CONTINUOUS
Status: DISCONTINUED | OUTPATIENT
Start: 2022-06-04 | End: 2022-06-05 | Stop reason: HOSPADM

## 2022-06-04 RX ADMIN — APIXABAN 10 MG: 5 TABLET, FILM COATED ORAL at 05:21

## 2022-06-04 RX ADMIN — DIPHENHYDRAMINE HYDROCHLORIDE 50 MG: 50 INJECTION INTRAMUSCULAR; INTRAVENOUS at 10:03

## 2022-06-04 RX ADMIN — FAMOTIDINE 20 MG: 20 TABLET, FILM COATED ORAL at 05:20

## 2022-06-04 RX ADMIN — ALLOPURINOL 200 MG: 100 TABLET ORAL at 05:20

## 2022-06-04 RX ADMIN — PREDNISONE 60 MG: 10 TABLET ORAL at 05:21

## 2022-06-04 RX ADMIN — IMATINIB MESYLATE 400 MG: 400 TABLET, FILM COATED ORAL at 08:23

## 2022-06-04 RX ADMIN — PEGASPARGASE 4749.75 UNITS: 750 INJECTION, SOLUTION INTRAMUSCULAR; INTRAVENOUS at 10:36

## 2022-06-04 RX ADMIN — SODIUM CHLORIDE 500 ML: 9 INJECTION, SOLUTION INTRAVENOUS at 10:00

## 2022-06-04 RX ADMIN — SULFAMETHOXAZOLE AND TRIMETHOPRIM 2 TABLET: 400; 80 TABLET ORAL at 08:04

## 2022-06-04 RX ADMIN — FAMOTIDINE 19.6 MG: 10 INJECTION, SOLUTION INTRAVENOUS at 10:03

## 2022-06-04 RX ADMIN — SENNOSIDES AND DOCUSATE SODIUM 2 TABLET: 50; 8.6 TABLET ORAL at 05:20

## 2022-06-04 ASSESSMENT — FIBROSIS 4 INDEX: FIB4 SCORE: 4.37

## 2022-06-04 NOTE — DISCHARGE PLANNING
Received notification from [facility] that [employer] needs a prior authorization. Called [phone] to initiate PA. Their office is closed at 5 pm and does not open until 8 am Monday Morning. Submitted PA via [email or web address] - key # [id number].     PA will likely not go through prior to 8 am Monday morning. May need to send Approved Service to [facility] for 5 pills (1.5 per day) to cover the patient until it is approved on Monday.     Left hand off for unit LSW working tomorrow.

## 2022-06-04 NOTE — PROGRESS NOTES
"Pharmacy Chemotherapy Verification Note:    Dx: HR B-ALL        Protocol and Dosing Reference: Induction [facility] Protocol (enrolled on APEC14B1-starting Day 15)         Allergies:  Amoxicillin     /60   Pulse 98   Temp 35.9 °C (96.6 °F) (Temporal)   Resp 18   Ht 1.651 m (5' 5\")   Wt 71.8 kg (158 lb 4.6 oz)   SpO2 98%   BMI 26.34 kg/m²  Body surface area is 1.81 meters squared.    Labs 6/4/22  Glucose =  143    5/30/22 ECHO LVEF 75%     Drug Order   (Drug name, dose, route, IV Fluid & volume, frequency, number of doses) Cycle: induction Day 4  Previous treatment: Induction Day 1 on 5/30/22     Medication = Pegaspargase  Base Dose = 2500 intn'l units/m2  Calc Dose: Base Dose x 1.81 m2 = 4525 intn'l units  Final Dose = 4749.75 intn'l units  Route = IV  Fluid & Volume =  mL  Admin Duration = Over 2 hrs          <10% difference, okay to treat with final dose        By my signature below, I confirm this process was performed independently with the BSA and all final chemotherapy dosing calculations congruent. I have reviewed the above chemotherapy order and that my calculation of the final dose and BSA (when applicable) corroborate those calculations of the  pharmacist.     Marifer Torres, PharmD, BCOP       "

## 2022-06-04 NOTE — DISCHARGE PLANNING
Meds-to-Beds: Discharge prescription orders listed below delivered to patient's bedside. RN and Dr. Reed notified. Patient counseled.      Patient elected to have co-payment billed to patient account.     Current Outpatient Medications   Medication Sig Dispense Refill   • sulfamethoxazole-trimethoprim (BACTRIM) 400-80 MG Tab Take 2 tablets by mouth twice daily every Saturday and Sunday 40 Tablet 11   • senna-docusate (PERICOLACE OR SENOKOT S) 8.6-50 MG Tab Take 2 Tablets by mouth every day. 40 Tablet 1   • predniSONE (DELTASONE) 20 MG Tab Take 3 Tablets by mouth 2 times a day for 22 days. 132 Tablet 0   • polyethylene glycol/lytes (MIRALAX) 17 g Pack Mix 1 Packet per package instructions and drink by mouth 1 time a day as needed (if sennosides and docusate ineffective after 24 hours). 10 Each 3   • famotidine (PEPCID) 20 MG Tab Take 1 Tablet by mouth 2 times a day. 60 Tablet 1   • apixaban (ELIQUIS) 5mg Tab Take 2 Tablets by mouth 2 times a day. Starting June 9th, take 1 tablet 2 times a day thereafter. Indications: DVT/PE 70 Tablet 2   • Sodium Chloride Flush (NORMAL SALINE FLUSH) 0.9 % Solution Infuse 10 mL into a venous catheter every 12 hours. 600 mL 0   • imatinib (GLEEVEC) 400 MG tablet Take 1 tablet in the morning and 1/2 tablet (200 mg) in the evening through June 13 15 Tablet 0      Verna Ayon

## 2022-06-04 NOTE — PATIENT INSTRUCTIONS
1000--Pt to clinic visit for admin  of chemo. Assessment unchanged from inpatient setting. 1255- chemo done.tolerated well.   1445-vs finished post infusion. Did demo and return demo with mom on changing claves,flushing with ns and placing swab caps. Mom did well on return demo Awaiting Dr. Rodriguez to talk to mom before they leave. 1510-dc'd to home

## 2022-06-04 NOTE — PROGRESS NOTES
Pharmacy Chemotherapy Calculations    Patient Name: Tomas Jean-Baptiste     Diagnosis: Ph+ ALL     Induction, Day 6 (treatment Day 4 delayed)     Previous Treatment:   Induction, Day 1 on 5/30/22     Regimen: Induction Days 1 - 14 per [facility] Institutional Standard of Care (prior to enrollment in [id number] phase 3 trial)   Intrathecal Cytarabine 70 mg (for age greater than 3 years old) on Day 1   Vincristine 1.5 mg/m2/dose IV push/infusion over 2 - 10 minutes on Days 1 and 8   Daunorubicin 25 mg/m2/dose IV push/infusion over 1 - 15 minutes on Days 1 and 8   Pegaspargase 2,500 international units/m2/dose IV over 1 - 2 hours on Day 4 (deferred to day 6 d/t clinical status)   · Of note: Patient does not meet recommended criteria for dose cap at this time  Intrathecal Methotrexate (for age greater than 9 years old) 15 mg on Day 8   Prednisone 30 mg/m2/dose PO BID (rounded to nearest tablet size)  Gleevec 600 mg PO daily (400 mg qAM and 200 mg qPM) on Days 5 - 14    Allergies: Amoxicillin       There were no vitals taken for this visit. There is no height or weight on file to calculate BSA.     Weight Type Height Weight BSA Additional Details   Treatment plan 165.1 cm 78.4 kg 1.9 m² Documented weight as of 5/30/2022 11:32 AM; height as of 5/27/2022  5:58 PM     No specified parameters to meet according to treatment plan/roadmap.     Pegaspargase (ONCASPAR) 2,500 itn'l units/m² x 1.81 m² = 4,525 mg   <10% difference, OK to treat with final dose = 4,749.75 mg IV over 2 hours       Kayla Juarez, PharmD, BCPS
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
## 2022-06-04 NOTE — PROGRESS NOTES
Lab called with critical result of WBC 1.4 at 0340. Critical lab result read back to lab.   Dr. Rodriguez notified of critical lab result at 0345.

## 2022-06-04 NOTE — PROGRESS NOTES
0940-Discharged to be admitted to clinic for chemo admin.   1130- went over dc instructions with mom. Gloves, alcohol swabs, swab caps and claves given to mom. Will teach mom on flushing picc, changing claves if needed and applying swab caps after flushing. Ns flushes as well as all home meds given to mom with instructions from meds to beds.

## 2022-06-04 NOTE — PROGRESS NOTES
Gait steadier today ambulating to bathroom. Discharged for arrival in clinic for infusion of peg-aspiriginase.

## 2022-06-04 NOTE — DISCHARGE PLANNING
Discussed with bedside RN and Meds to Beds. PA has not been approved. Approved services cost of Gleevac is $24.35 for full prescription. Faxed Approved Services paperwork to [facility] pharmacy.    After DC received fax that PA was denied. Faxed to Dr. Faye's office for follow up outpatient. Patient discharged with medication for home.

## 2022-06-06 ENCOUNTER — APPOINTMENT (OUTPATIENT)
Dept: ONCOLOGY | Facility: MEDICAL CENTER | Age: 22
End: 2022-06-06
Attending: PEDIATRICS
Payer: COMMERCIAL

## 2022-06-06 RX ORDER — SODIUM CHLORIDE 9 MG/ML
500 INJECTION, SOLUTION INTRAVENOUS CONTINUOUS
Status: CANCELLED | OUTPATIENT
Start: 2022-06-07

## 2022-06-06 RX ORDER — ONDANSETRON 2 MG/ML
8 INJECTION INTRAMUSCULAR; INTRAVENOUS EVERY 8 HOURS PRN
Status: CANCELLED | OUTPATIENT
Start: 2022-06-07

## 2022-06-06 RX ORDER — LORAZEPAM 2 MG/ML
2 INJECTION INTRAMUSCULAR EVERY 6 HOURS PRN
Status: CANCELLED | OUTPATIENT
Start: 2022-06-07

## 2022-06-06 RX ORDER — PROMETHAZINE HYDROCHLORIDE 6.25 MG/5ML
0.25 SYRUP ORAL EVERY 6 HOURS PRN
Status: CANCELLED | OUTPATIENT
Start: 2022-06-07

## 2022-06-06 RX ORDER — LIDOCAINE AND PRILOCAINE 25; 25 MG/G; MG/G
CREAM TOPICAL PRN
Status: CANCELLED | OUTPATIENT
Start: 2022-06-07

## 2022-06-06 RX ORDER — ONDANSETRON 2 MG/ML
8 INJECTION INTRAMUSCULAR; INTRAVENOUS ONCE
Status: CANCELLED | OUTPATIENT
Start: 2022-06-07

## 2022-06-07 ENCOUNTER — HOSPITAL ENCOUNTER (OUTPATIENT)
Dept: INFUSION CENTER | Facility: MEDICAL CENTER | Age: 22
End: 2022-06-07
Attending: PEDIATRICS
Payer: COMMERCIAL

## 2022-06-07 VITALS
RESPIRATION RATE: 20 BRPM | SYSTOLIC BLOOD PRESSURE: 117 MMHG | DIASTOLIC BLOOD PRESSURE: 64 MMHG | HEIGHT: 65 IN | BODY MASS INDEX: 25.75 KG/M2 | HEART RATE: 90 BPM | TEMPERATURE: 98.3 F | OXYGEN SATURATION: 98 % | WEIGHT: 154.54 LBS

## 2022-06-07 DIAGNOSIS — C91.Z0 B LYMPHOBLASTIC LEUKEMIA WITH T(9;22)(Q34;Q11.2);BCR-ABL1 (HCC): ICD-10-CM

## 2022-06-07 DIAGNOSIS — T45.1X5A CINV (CHEMOTHERAPY-INDUCED NAUSEA AND VOMITING): ICD-10-CM

## 2022-06-07 DIAGNOSIS — Z51.11 ENCOUNTER FOR ANTINEOPLASTIC CHEMOTHERAPY: ICD-10-CM

## 2022-06-07 DIAGNOSIS — R11.2 CINV (CHEMOTHERAPY-INDUCED NAUSEA AND VOMITING): ICD-10-CM

## 2022-06-07 DIAGNOSIS — C91.00 PRE B-CELL ACUTE LYMPHOBLASTIC LEUKEMIA (ALL) (HCC): ICD-10-CM

## 2022-06-07 LAB
BASOPHILS # BLD AUTO: 0 % (ref 0–1.8)
BASOPHILS # BLD: 0 K/UL (ref 0–0.12)
BILIRUB CONJ SERPL-MCNC: 0.3 MG/DL (ref 0.1–0.5)
BURR CELLS/RBC NFR CSF MANUAL: 0 %
CLARITY CSF: CLEAR
COLOR CSF: COLORLESS
COLOR SPUN CSF: COLORLESS
CSF COMMENTS 1658: NORMAL
CYTOLOGY REG CYTOL: NORMAL
EOSINOPHIL # BLD AUTO: 0 K/UL (ref 0–0.51)
EOSINOPHIL NFR BLD: 0 % (ref 0–6.9)
ERYTHROCYTE [DISTWIDTH] IN BLOOD BY AUTOMATED COUNT: 44.9 FL (ref 35.9–50)
HCT VFR BLD AUTO: 29.8 % (ref 42–52)
HGB BLD-MCNC: 10.3 G/DL (ref 14–18)
LYMPHOCYTES # BLD AUTO: 0.61 K/UL (ref 1–4.8)
LYMPHOCYTES NFR BLD: 55.9 % (ref 22–41)
LYMPHOCYTES NFR CSF: 100 %
MANUAL DIFF BLD: NORMAL
MCH RBC QN AUTO: 29.9 PG (ref 27–33)
MCHC RBC AUTO-ENTMCNC: 34.6 G/DL (ref 33.7–35.3)
MCV RBC AUTO: 86.6 FL (ref 81.4–97.8)
MONOCYTES # BLD AUTO: 0.02 K/UL (ref 0–0.85)
MONOCYTES NFR BLD AUTO: 1.8 % (ref 0–13.4)
MORPHOLOGY BLD-IMP: NORMAL
NEUTROPHILS # BLD AUTO: 0.47 K/UL (ref 1.82–7.42)
NEUTROPHILS NFR BLD: 42.3 % (ref 44–72)
NRBC # BLD AUTO: 0 K/UL
NRBC BLD-RTO: 0 /100 WBC
PLATELET # BLD AUTO: 30 K/UL (ref 164–446)
PLATELET BLD QL SMEAR: NORMAL
PMV BLD AUTO: 11.3 FL (ref 9–12.9)
RBC # BLD AUTO: 3.44 M/UL (ref 4.7–6.1)
RBC # CSF: 24 CELLS/UL
RBC BLD AUTO: NORMAL
SPECIMEN VOL CSF: 2 ML
TUBE # CSF: 2
TUBE # CSF: 3
WBC # BLD AUTO: 1.1 K/UL (ref 4.8–10.8)
WBC # CSF: 2 CELLS/UL (ref 0–10)

## 2022-06-07 PROCEDURE — 503422 HCHG CHILDRENS ANESTHESIA

## 2022-06-07 PROCEDURE — 700101 HCHG RX REV CODE 250: Performed by: PEDIATRICS

## 2022-06-07 PROCEDURE — 700111 HCHG RX REV CODE 636 W/ 250 OVERRIDE (IP): Performed by: PEDIATRICS

## 2022-06-07 PROCEDURE — 96374 THER/PROPH/DIAG INJ IV PUSH: CPT | Mod: XU

## 2022-06-07 PROCEDURE — 700105 HCHG RX REV CODE 258: Performed by: PEDIATRICS

## 2022-06-07 PROCEDURE — 82248 BILIRUBIN DIRECT: CPT

## 2022-06-07 PROCEDURE — 700111 HCHG RX REV CODE 636 W/ 250 OVERRIDE (IP)

## 2022-06-07 PROCEDURE — 99215 OFFICE O/P EST HI 40 MIN: CPT | Mod: 25 | Performed by: PEDIATRICS

## 2022-06-07 PROCEDURE — 96450 CHEMOTHERAPY INTO CNS: CPT | Performed by: PEDIATRICS

## 2022-06-07 PROCEDURE — 85025 COMPLETE CBC W/AUTO DIFF WBC: CPT

## 2022-06-07 PROCEDURE — 85007 BL SMEAR W/DIFF WBC COUNT: CPT

## 2022-06-07 PROCEDURE — 700105 HCHG RX REV CODE 258

## 2022-06-07 PROCEDURE — 96411 CHEMO IV PUSH ADDL DRUG: CPT

## 2022-06-07 PROCEDURE — 96409 CHEMO IV PUSH SNGL DRUG: CPT

## 2022-06-07 PROCEDURE — 96450 CHEMOTHERAPY INTO CNS: CPT

## 2022-06-07 PROCEDURE — 36592 COLLECT BLOOD FROM PICC: CPT

## 2022-06-07 PROCEDURE — 88108 CYTOPATH CONCENTRATE TECH: CPT

## 2022-06-07 PROCEDURE — 89051 BODY FLUID CELL COUNT: CPT

## 2022-06-07 RX ORDER — ONDANSETRON 8 MG/1
8 TABLET, ORALLY DISINTEGRATING ORAL EVERY 8 HOURS PRN
Qty: 20 TABLET | Refills: 0 | Status: ON HOLD | OUTPATIENT
Start: 2022-06-07 | End: 2023-02-07

## 2022-06-07 RX ORDER — LORAZEPAM 2 MG/ML
2 INJECTION INTRAMUSCULAR EVERY 6 HOURS PRN
Status: DISCONTINUED | OUTPATIENT
Start: 2022-06-07 | End: 2022-06-08 | Stop reason: HOSPADM

## 2022-06-07 RX ORDER — SODIUM CHLORIDE 9 MG/ML
INJECTION, SOLUTION INTRAVENOUS CONTINUOUS
Status: DISCONTINUED | OUTPATIENT
Start: 2022-06-07 | End: 2022-06-08 | Stop reason: HOSPADM

## 2022-06-07 RX ORDER — LORAZEPAM 1 MG/1
1 TABLET ORAL EVERY 6 HOURS PRN
Qty: 40 TABLET | Refills: 0 | Status: SHIPPED | OUTPATIENT
Start: 2022-06-07 | End: 2022-06-07 | Stop reason: SDUPTHER

## 2022-06-07 RX ORDER — ONDANSETRON 2 MG/ML
8 INJECTION INTRAMUSCULAR; INTRAVENOUS ONCE
Status: COMPLETED | OUTPATIENT
Start: 2022-06-07 | End: 2022-06-07

## 2022-06-07 RX ORDER — LIDOCAINE AND PRILOCAINE 25; 25 MG/G; MG/G
1 CREAM TOPICAL PRN
Status: DISCONTINUED | OUTPATIENT
Start: 2022-06-07 | End: 2022-06-08 | Stop reason: HOSPADM

## 2022-06-07 RX ORDER — ONDANSETRON 8 MG/1
8 TABLET, ORALLY DISINTEGRATING ORAL EVERY 8 HOURS PRN
Qty: 20 TABLET | Refills: 0 | Status: SHIPPED | OUTPATIENT
Start: 2022-06-07 | End: 2022-06-07 | Stop reason: SDUPTHER

## 2022-06-07 RX ORDER — LORAZEPAM 1 MG/1
1 TABLET ORAL EVERY 6 HOURS PRN
Qty: 40 TABLET | Refills: 0 | Status: SHIPPED | OUTPATIENT
Start: 2022-06-07 | End: 2022-06-17

## 2022-06-07 RX ORDER — LIDOCAINE AND PRILOCAINE 25; 25 MG/G; MG/G
CREAM TOPICAL PRN
Status: DISCONTINUED | OUTPATIENT
Start: 2022-06-07 | End: 2022-06-08 | Stop reason: HOSPADM

## 2022-06-07 RX ADMIN — SODIUM CHLORIDE 44.8 MG: 9 INJECTION, SOLUTION INTRAVENOUS at 13:30

## 2022-06-07 RX ADMIN — METHOTREXATE 15 MG: 25 INJECTION INTRA-ARTERIAL; INTRAMUSCULAR; INTRATHECAL; INTRAVENOUS at 12:22

## 2022-06-07 RX ADMIN — VINCRISTINE SULFATE 2 MG: 1 INJECTION, SOLUTION INTRAVENOUS at 13:25

## 2022-06-07 RX ADMIN — LIDOCAINE AND PRILOCAINE 1 APPLICATION: 25; 25 CREAM TOPICAL at 10:32

## 2022-06-07 RX ADMIN — PROPOFOL 100 MG: 10 INJECTION, EMULSION INTRAVENOUS at 12:15

## 2022-06-07 RX ADMIN — SODIUM CHLORIDE: 9 INJECTION, SOLUTION INTRAVENOUS at 12:11

## 2022-06-07 RX ADMIN — ONDANSETRON 8 MG: 2 INJECTION INTRAMUSCULAR; INTRAVENOUS at 10:32

## 2022-06-07 ASSESSMENT — FIBROSIS 4 INDEX: FIB4 SCORE: 4.37

## 2022-06-07 NOTE — PROGRESS NOTES
Hand off received from Dayna Damian, MONTSERRAT.     Patient transported to procedure room and placed on monitoring equipment.     PICC in place PTA. Verified patency prior to procedure.   Sedation performed by Dr. Santoro, procedure performed by Dr. Faye.      Sedation Start/ Time Out Time: 1214    Monitored PT q5min and documented VS q5min per protocol.  LP completed at 1222.   See MAR for medication adminsitration.  No unexpected events.  PT woke from sedation without complications.      Stop time: 1229    Patient transported in stable condition to [facility] for recovery.     Chemotherapy dosage calculated independently by Tammie Covington RN and Dayna Damian, RN and compared to road map for protocol [id number].  Calculations within 10% of written order.  Lab results reviewed.      Blood return verified prior to, during, and after chemotherapy infusion.  See Chemotherapy flowsheet.  PT tolerated well.  No side effects or complications noted.  PICC flushed with 20ml of NS. PT home with mother.  Will return for next visit on 6/13/22.

## 2022-06-07 NOTE — PROGRESS NOTES
Pt to [facility] for lab draw, Doctor visit, lumbar puncture with sedation for IT Chemotherapy and for IV Chemotherapy. Accompanied by mother.  Afebrile.  VSS.  Awake and alert in no acute distress.  Pt has R UA single lumen PICC. Labs drawn from the PICC without difficulty, brisk blood return and flushes easily.  Pt tolerated well.      Visit with Dr. Faye completed. Okay to proceed with chemotherapy and LP today.  Dr. June to bedside for patient exam and consents. Patient cleared for sedation.     Labs reviewed and pre-medications given per MD orders, see MAR.     Hand off to Tammie HOLMAN RN for sedation and IV chemo, recovery and discharge home.

## 2022-06-07 NOTE — PROGRESS NOTES
"Pharmacy Chemotherapy Verification Note:    Dx: HR B-ALL        Protocol and Dosing Reference: Induction per [facility] [id number] (enrolled on [id number])         Allergies:  Amoxicillin     /70   Pulse 82   Temp 36.3 °C (97.3 °F) (Temporal)   Resp 18   Ht 1.651 m (5' 5\")   Wt 70.1 kg (154 lb 8.7 oz)   SpO2 99%   BMI 25.72 kg/m²  Body surface area is 1.79 meters squared.    All lab results 6/7/22 reviewed.   MD aware of all current lab results. Orders received to proceed with treatment.  5/30/22 ECHO LVEF 75%     Drug Order   (Drug name, dose, route, IV Fluid & volume, frequency, number of doses) Cycle: induction Day 8      Previous treatment: Induction D4 6/4/22     Medication = methotrexate  Base Dose = 15mg fixed dose for age  Calc Dose: no calc req'd  Final Dose = 15mg  Route = intraTHECAL  Fluid & Volume = pfNS 6 mL  Admin Duration = IT inj by MD only   To be given by MD      <10% difference, okay to treat with final dose      Medication = vincristine  Base Dose = 1.5 mg/m2 (MAX 2 mg)  Calc Dose: Base Dose x 1.79 m2 > 2mg  Final Dose = 2 mg  Route = IV  Fluid & Volume = NS 25 mL  Admin Duration = Over 5-10 mins          Ok to treat with max dose   Medication = daunorubicin  Base Dose = 25 mg/m2  Calc Dose: Base Dose x 1.79m2 = 44.8mg  Final Dose = 44.8mg  Route = IV  Fluid & Volume = NS 25 mL  Admin Duration = Over 1-15 mins          <10% difference, okay to treat with final dose     By my signature below, I confirm this process was performed independently with the BSA and all final chemotherapy dosing calculations congruent. I have reviewed the above chemotherapy order and that my calculation of the final dose and BSA (when applicable) corroborate those calculations of the  pharmacist.     JAE Wilson, Pharm.D.      "

## 2022-06-07 NOTE — H&P
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     Baseline ECHO/EKG: EF 75%, SF 42%, QTc 413 ms     Treatment Plan Medications:  (verified 100% compliance)     Imatinib 400 mg PO AM, Imatinib 200 mg PO PM (start 6/3/2022)   Prednisone 60 mg PO BID (start 5/30/2022)     Evaluations / Study Labs (6/7/2022):     WBC 1.1, Hgb 10.3, platelets 30  ,  - no blasts noted on peripheral smear     Direct bilirubin 0.3     CSF with WBC 2, RBC 24     Therapy Given:    Methotrexate 15 mg IT  Vincristine 2 mg IV  Daunorubicin 44.8 mg IV

## 2022-06-07 NOTE — PROGRESS NOTES
Pharmacy Chemotherapy Calculations    Patient Name: Tomas Jean-Baptiste     Diagnosis: Ph+ ALL     Induction, Day 8    Previous Treatment:   Induction, Day 1 on 5/30/22   Induction, Day 6 on 6/4/22    Regimen: Induction Days 1 - 14 per [facility] Institutional Standard of Care (prior to enrollment in [id number] phase 3 trial)   Intrathecal Cytarabine 70 mg (for age greater than 3 years old) on Day 1   Vincristine 1.5 mg/m2/dose IV push/infusion over 2 - 10 minutes on Days 1 and 8   Daunorubicin 25 mg/m2/dose IV push/infusion over 1 - 15 minutes on Days 1 and 8   Pegaspargase 2,500 international units/m2/dose IV over 1 - 2 hours on Day 4 (deferred to day 6 d/t clinical status)   Intrathecal Methotrexate (for age greater than 9 years old) 15 mg on Day 8   Prednisone 30 mg/m2/dose PO BID (rounded to nearest tablet size)  Gleevec 600 mg PO daily (400 mg qAM and 200 mg qPM) on Days 5 - 14    Allergies: Amoxicillin       There were no vitals taken for this visit. There is no height or weight on file to calculate BSA.       Weight Type Height Weight BSA Additional Details   Treatment plan 165.1 cm 70.1 kg 1.79 m² Documented weight as of 6/7/2022 10:05 AM; height as of 6/7/2022 10:05 AM       No chemotherapy hold parameters for Day 8 per treatment plan.   Platelets less than 50,000 (30 K) on 6/7/22 - OK to proceed with LP/IT MTX as planned per Oncologist.       IT Methotrexate PF set dose for age greater than 9 years old    No calculations required, OK to treat with final dose = 15 mg IT    Administration by MD Only     Vincristine (ONCOVIN) 1.5 mg/m² x 1.79 m² = 2.685 mg   OK to treat with MAX dose = 2 mg IV     Daunorubicin (CERUBIDINE) 25 mg/m² x 1.79 m² = 44.75 mg   <10% difference, OK to treat with final dose = 44.8 mg IV      05/30/22    Left Ventrical Ejection Fraction 75       Kayla Juarez, PharmD, BCPS

## 2022-06-07 NOTE — PROCEDURES
Pediatric Intensivist Consultation   for   Deep Sedation     Date: 6/7/2022     Time: 11:02 AM        Asked by Dr. Faye to consult for sedation services    Chief complaint:  Need for sedation for lumbar puncture and intrathecal chemotherapy.    Allergies:   Allergies   Allergen Reactions   • Amoxicillin Rash     Reacted as an infant. No swelling or airway problems.       Details of Present Illness:  Tomas Roy  is a 21 y.o. male who presents with new diagnosis (May 27, 2022) Cloud-chromosome-positive Pre-Bcell ALL. He was diagnosed with a high disease burden and induction was complicated by febrile neutropenia. Today is his first outpatient intrathecal chemotherapy part of induction. JULY was sedated for an LP, bone marrow and PICC placement during his initial hospitalization with ketamine and has not had propofol yet.     Patient reports that he has had very low energy and fatigue since discharge from the hospital. He says yesterday he may have tried to do too much and felt heart palpitations and had to catch his breath. He says he has really bad vision and it is hard to tell if his vision is more blurry than usual. He denies fever or cold, or cough and congestive symptoms. We discussed the possibility of doing the LP procedure without sedation in the future but will do sedation today.     Reviewed past and family history. No h/o complications with sedation, no h/o snoring or apnea. I discussed side effects at length with the patient.       Family History   Problem Relation Age of Onset   • No Known Problems Mother    • Diabetes Paternal Grandfather    • Hypertension Paternal Grandfather    • Hyperlipidemia Paternal Grandfather    • Cancer Neg Hx    • Heart Disease Neg Hx    • Stroke Neg Hx        Review of Body Systems: Pertinent issues noted in HPI, full review of 10 systems reveals no other significant concerns.    NPO status:   Greater than 8 hours since taking solids and greater than 6 hours of  "clears or formula or Breast milk      Physical Exam:  Blood pressure 110/70, pulse 82, temperature 36.3 °C (97.3 °F), temperature source Temporal, resp. rate 18, height 1.651 m (5' 5\"), weight 70.1 kg (154 lb 8.7 oz), SpO2 99 %.    General appearance: fatigued, alert, well nourished  HEENT: PERRL, EOMI, nares clear, dry mucous membranes, neck supple  Lungs: CTAB, good AE without wheeze or rales  Heart: regular rate, no murmur or gallop, full and equal pulses  Abd: soft, NT/ND, NABS  Ext: warm, well perfused, SUAREZ  Neuro: intact exam, no gross motor or sensory deficits, appropriate affect  Skin: no rash, petechiae or purpura, notable pallor, PICC in right arm    Current Outpatient Medications on File Prior to Encounter   Medication Sig Dispense Refill   • sulfamethoxazole-trimethoprim (BACTRIM) 400-80 MG Tab Take 2 tablets by mouth twice daily every Saturday and Sunday 40 Tablet 11   • senna-docusate (PERICOLACE OR SENOKOT S) 8.6-50 MG Tab Take 2 Tablets by mouth every day. 40 Tablet 1   • predniSONE (DELTASONE) 20 MG Tab Take 3 Tablets by mouth 2 times a day for 22 days. 132 Tablet 0   • polyethylene glycol/lytes (MIRALAX) 17 g Pack Mix 1 Packet per package instructions and drink by mouth 1 time a day as needed (if sennosides and docusate ineffective after 24 hours). 10 Each 3   • famotidine (PEPCID) 20 MG Tab Take 1 Tablet by mouth 2 times a day. 60 Tablet 1   • apixaban (ELIQUIS) 5mg Tab Take 2 Tablets by mouth 2 times a day. Starting June 9th, take 1 tablet 2 times a day thereafter. Indications: DVT/PE 70 Tablet 2   • Sodium Chloride Flush (NORMAL SALINE FLUSH) 0.9 % Solution Infuse 10 mL into a venous catheter every 12 hours. 600 mL 0   • imatinib (GLEEVEC) 400 MG tablet Take 1 tablet in the morning and 1/2 tablet (200 mg) in the evening through June 13 15 Tablet 0   • ammonium lactate (LAC-HYDRIN) 12 % Lotion Apply to peeling areas of feet twice daily as needed 500 g 0   • ascorbic acid (ASCORBIC ACID) 500 MG " Tab Take 500 mg by mouth every day.     • therapeutic multivitamin-minerals (THERAGRAN-M) Tab Take 1 Tab by mouth every day.       No current facility-administered medications on file prior to encounter.       Impression/diagnosis:  Principal Problem:  Patient Active Problem List    Diagnosis Date Noted   • Left abducens nerve palsy 06/03/2022   • Myopia of both eyes 06/03/2022   • Acquired pancytopenia ([facility]) 06/03/2022   • Epistaxis, recurrent 06/02/2022   • Pre B-cell acute lymphoblastic leukemia (ALL) ([facility]ly [facility]) 05/29/2022   • Acute lymphoblastic leukemia in adult (Formerly [facility]) 05/27/2022   • PE (pulmonary thromboembolism) ([facility]) 05/27/2022   • Superficial vein thrombosis 05/27/2022   • Family history of diabetes mellitus 08/18/2020   • Callus of foot 08/18/2020   • Flat feet 04/03/2020   • Bilateral anterior knee pain 04/03/2020   • Bilateral ankle joint pain 04/03/2020   • Chronic midline thoracic back pain 04/03/2020       Plan:  Deep monitored sedation for intrathecal chemotherapy and lumbar puncture    ASA Classification: III    Planned Sedation/Anesthesia Agent:  Propofol    Airway Assessment:  an adequate airway, no risk factors, no craniofacial anomalies, no h/o difficult intubation    Mallampati score: 2        Pre-sedation assessment:    I have reassessed the patient just prior to the procedure and the patient remains an appropriate candidate to undergo the planned procedure and sedation:  Yes      Informed consent was discussed with parent and/or legal guardian including the risks, benefits, potential complications of the planned sedation.  Their questions have been answered and they have given informed consent:  Yes    Pre-sedation Assessment Time: spent for exam, and obtaining consent was: 15 minutes    Time out:  Done with family, patient and sedation RN    Post-sedation note:    Total Propofol dose: 100 mg    Post-sedation assessment:  Patient is stable postoperatively and has adequately recovered from  anesthesia as described below unless otherwise noted. Patient is determined to have stable airway patency and respiratory function including respiratory rate and oxygen saturation. Patient has a stable heart rate, blood pressure, and adequate hydration. Patient's mental status is acceptable. Patient's temperature is appropriate. Pain and nausea are adequately controlled. Refer to nursing notes for full documentation of vital signs. RN at bedside to continue monitoring.    Temp: 97.8  Pain score: 0/10  BP: 86/61 (MAP 75)    Sedation Time Out/Start time: 12:14    Sedation end time: 12:29

## 2022-06-08 ENCOUNTER — TELEPHONE (OUTPATIENT)
Dept: INFUSION CENTER | Facility: MEDICAL CENTER | Age: 22
End: 2022-06-08
Payer: COMMERCIAL

## 2022-06-08 ENCOUNTER — PATIENT OUTREACH (OUTPATIENT)
Dept: PEDIATRIC HEMATOLOGY/ONCOLOGY | Facility: OUTPATIENT CENTER | Age: 22
End: 2022-06-08
Payer: COMMERCIAL

## 2022-06-08 LAB
ACUTE LEUKEMIA MARKERS SPEC-IMP: NORMAL
EVENTS COUNTED SPEC: 13 MARKERS

## 2022-06-08 NOTE — PROGRESS NOTES
Medical Social Work    Referral: Pediatric Hematology - Oncology / Dr. Faye - new patient diagnosed with ALL    Intervention: SW attempted to reach out to both patient and MOP to provide resources and additional support. No answer on MOP phone, v/m left. Patient's phone went straight to v/m. Again, v/m was left.    Plan: SW will attempt to contact again on 6/13/22

## 2022-06-08 NOTE — PROCEDURES
Pediatric Oncology Lumbar Puncture  Procedure Note      Patient Name:  Tomas Jean-Baptiste  : 2000   MRN: [id number]    Service Location:  [facility]  Date of Service: 2022  Time: 12:14 PM    Procedure Performed By: Pepe Faye M.D.    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 June), EMLA cream    Intrathecal Chemotherapy:  Yes    Chemotherapy Administered:  Methotrexate 15 mg IT (in 6 mL NS)    Needle Size:  22 gauge, 3.5 in  Site: L3-L4  Number of Attempts: 1  Fluid:  6 ml clear fluid obtained  Labs: Cell count, cytology    Complications:  None    Procedure Note:    Tomas Jean-Baptiste is a 21 y.o. 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  He presents today for scheduled chemotherapy, Induction Day 8 (chronological Day 9) and scheduled lumbar puncture with intrathecal chemotherapy.  Prior to the procedure, the risks and benefits were discussed with the patient and his mother.  Consent for the procedure was signed by the patient himself and placed in his chart.  All pertinent labs and history were reviewed and a complete History and Physical Examination were performed and placed in the medical record.  Tomas Roy was then taken to the procedure room where the procedure was performed.  All necessary safety equipment per ASA guidelines were available.  A time-out was performed and the patient identified by name,  and medical record number.  Gloves and mask were worn during the entire procedure.  Tomas Roy was prepped and draped in the usual sterile fashion with povoiodine.  He was  positioned in the left decubitus position and all landmarks including superior posterior iliac crest, umbilicus and vertebral bodies were identified by palpation.  A 3.5 in, 22 gauge spinal needle was introduced into the L3-L4 spinal interspace.  6 ml of clear fluid were obtained and sent for cell count and cytology.  Methotrexate 15 mg in 6 mL of NS was verified with the nurse bedside and then then administered into the spinal fluid. Tomas Roy tolerated the procedure without complication or bleeding.  He was instructed to lie flat for an hour following procedure.    Results:    PENDING    Pepe Fyae MD  Pediatric Hematology / Oncology  [facility]  Cell.  [phone]

## 2022-06-08 NOTE — TELEPHONE ENCOUNTER
Call placed to patient. Ensured patient doing well from previous visit. Discussed any concerns or questions with patient. Pt feels as if his heart rate is slightly elevated. Patient reports feeling well otherwise. RN encouraged patient to call MD with any further concerns or symptoms. RN updated Dr. Faye on patient's status. Patient happy with care he received.

## 2022-06-09 ENCOUNTER — PATIENT OUTREACH (OUTPATIENT)
Dept: PEDIATRIC HEMATOLOGY/ONCOLOGY | Facility: OUTPATIENT CENTER | Age: 22
End: 2022-06-09
Payer: COMMERCIAL

## 2022-06-09 DIAGNOSIS — Z51.11 ENCOUNTER FOR ANTINEOPLASTIC CHEMOTHERAPY: ICD-10-CM

## 2022-06-09 DIAGNOSIS — C91.Z0 B LYMPHOBLASTIC LEUKEMIA WITH T(9;22)(Q34;Q11.2);BCR-ABL1 (HCC): ICD-10-CM

## 2022-06-09 LAB
CHROM ANALY OVERALL INTERP-IMP: NORMAL
PATHOLOGY STUDY: NORMAL

## 2022-06-09 NOTE — PROGRESS NOTES
Medical Social Work    REGIS received phone call from MOP following up from allan/nehal STACY left on 6/8/22. REGIS discussed [facility] assisting patient to complete application for [facility]. REGIS explained the assistance [facility] is able to provide up to the age of 23yo. MOP tearful during phone call, REGIS provided support and addressed emotional needs.     MOP reports she is continuing to process patient's diagnosis and remain strong for her family. MOP is a single mother of 3 - patient, 15yo sister JANIYA, and 13yo brother JR. MOP states she does not have much support and is worried about repairs they were working on fixing in their home and now is unable to pay for them due to patient being unable to work. MOP reported roof currently has tarp covering, and was coming up with the money to complete leak in roof. MOP took out loan from her 401K, but has since returned the money as patient was coming up with the other portion with his job. Total job repair cost is going to be between $13k - $14k. MOP also states family is currently sharing one bathroom, as the second bathroom has mold and she does not want patient exposed. [facility] states she has all the materials, but needs access to a licensed contractor to complete the work. MOP states she has completed an application with [facility], however, is still awaiting a response.     REGIS assured [facility] that REGIS will work closely with [facility] to problem solve this situation and will assist [facility] with finding a contractor and finding programs that can assist with paying for the cost of the repairs.     Plan: REGIS will speak with [facility] about the above needs and continue to provide support to MOP and family.

## 2022-06-10 RX ORDER — LORAZEPAM 2 MG/ML
1 INJECTION INTRAMUSCULAR EVERY 6 HOURS PRN
Status: CANCELLED | OUTPATIENT
Start: 2022-06-13

## 2022-06-10 RX ORDER — ONDANSETRON 2 MG/ML
8 INJECTION INTRAMUSCULAR; INTRAVENOUS ONCE
Status: CANCELLED | OUTPATIENT
Start: 2022-06-13

## 2022-06-10 RX ORDER — ONDANSETRON 2 MG/ML
8 INJECTION INTRAMUSCULAR; INTRAVENOUS EVERY 8 HOURS PRN
Status: CANCELLED | OUTPATIENT
Start: 2022-06-13

## 2022-06-10 RX ORDER — LIDOCAINE AND PRILOCAINE 25; 25 MG/G; MG/G
CREAM TOPICAL PRN
Status: CANCELLED | OUTPATIENT
Start: 2022-06-13

## 2022-06-10 RX ORDER — PROMETHAZINE HYDROCHLORIDE 6.25 MG/5ML
0.25 SYRUP ORAL EVERY 6 HOURS PRN
Status: CANCELLED | OUTPATIENT
Start: 2022-06-13

## 2022-06-10 RX ORDER — SODIUM CHLORIDE 9 MG/ML
500 INJECTION, SOLUTION INTRAVENOUS CONTINUOUS
Status: CANCELLED | OUTPATIENT
Start: 2022-06-13

## 2022-06-13 ENCOUNTER — APPOINTMENT (OUTPATIENT)
Dept: ONCOLOGY | Facility: MEDICAL CENTER | Age: 22
End: 2022-06-13
Attending: PEDIATRICS
Payer: COMMERCIAL

## 2022-06-13 ENCOUNTER — HOSPITAL ENCOUNTER (OUTPATIENT)
Dept: INFUSION CENTER | Facility: MEDICAL CENTER | Age: 22
End: 2022-06-13
Attending: PEDIATRICS
Payer: COMMERCIAL

## 2022-06-13 ENCOUNTER — HOSPITAL ENCOUNTER (OUTPATIENT)
Dept: RADIOLOGY | Facility: MEDICAL CENTER | Age: 22
End: 2022-06-13
Attending: PEDIATRICS
Payer: COMMERCIAL

## 2022-06-13 VITALS
WEIGHT: 149.69 LBS | DIASTOLIC BLOOD PRESSURE: 58 MMHG | TEMPERATURE: 99.1 F | BODY MASS INDEX: 24.94 KG/M2 | RESPIRATION RATE: 20 BRPM | SYSTOLIC BLOOD PRESSURE: 114 MMHG | HEART RATE: 95 BPM | HEIGHT: 65 IN | OXYGEN SATURATION: 100 %

## 2022-06-13 DIAGNOSIS — D64.81 ANEMIA DUE TO ANTINEOPLASTIC CHEMOTHERAPY: ICD-10-CM

## 2022-06-13 DIAGNOSIS — Z01.89 ENCOUNTER FOR LUMBAR PUNCTURE: ICD-10-CM

## 2022-06-13 DIAGNOSIS — C91.00 PHILADELPHIA CHROMOSOME POSITIVE ACUTE LYMPHOBLASTIC LEUKEMIA (ALL) (HCC): ICD-10-CM

## 2022-06-13 DIAGNOSIS — C91.Z0 B LYMPHOBLASTIC LEUKEMIA WITH T(9;22)(Q34;Q11.2);BCR-ABL1 (HCC): ICD-10-CM

## 2022-06-13 DIAGNOSIS — T45.1X5A ANEMIA DUE TO ANTINEOPLASTIC CHEMOTHERAPY: ICD-10-CM

## 2022-06-13 DIAGNOSIS — Z51.11 ENCOUNTER FOR ANTINEOPLASTIC CHEMOTHERAPY: ICD-10-CM

## 2022-06-13 DIAGNOSIS — D69.6 THROMBOCYTOPENIA (HCC): ICD-10-CM

## 2022-06-13 PROBLEM — D64.89 OTHER SPECIFIED ANEMIAS: Status: ACTIVE | Noted: 2022-06-13

## 2022-06-13 LAB
ABO GROUP BLD: NORMAL
ALBUMIN SERPL BCP-MCNC: 3.1 G/DL (ref 3.2–4.9)
ALBUMIN/GLOB SERPL: 1.9 G/DL
ALP SERPL-CCNC: 49 U/L (ref 30–99)
ALT SERPL-CCNC: 70 U/L (ref 2–50)
ANION GAP SERPL CALC-SCNC: 11 MMOL/L (ref 7–16)
AST SERPL-CCNC: 15 U/L (ref 12–45)
BARCODED ABORH UBTYP: 7300
BARCODED PRD CODE UBPRD: NORMAL
BARCODED UNIT NUM UBUNT: NORMAL
BASOPHILS # BLD AUTO: 0 % (ref 0–1.8)
BASOPHILS # BLD: 0 K/UL (ref 0–0.12)
BILIRUB SERPL-MCNC: 1 MG/DL (ref 0.1–1.5)
BLD GP AB SCN SERPL QL: NORMAL
BUN SERPL-MCNC: 25 MG/DL (ref 8–22)
BURR CELLS/RBC NFR CSF MANUAL: 0 %
CALCIUM SERPL-MCNC: 7.5 MG/DL (ref 8.5–10.5)
CHLORIDE SERPL-SCNC: 102 MMOL/L (ref 96–112)
CLARITY CSF: CLEAR
CO2 SERPL-SCNC: 22 MMOL/L (ref 20–33)
COLOR CSF: COLORLESS
COLOR SPUN CSF: COLORLESS
COMPONENT P 8504P: NORMAL
CREAT SERPL-MCNC: 0.78 MG/DL (ref 0.5–1.4)
CSF COMMENTS 1658: NORMAL
CYTOLOGY REG CYTOL: NORMAL
EOSINOPHIL # BLD AUTO: 0 K/UL (ref 0–0.51)
EOSINOPHIL NFR BLD: 0 % (ref 0–6.9)
ERYTHROCYTE [DISTWIDTH] IN BLOOD BY AUTOMATED COUNT: 46 FL (ref 35.9–50)
GFR SERPLBLD CREATININE-BSD FMLA CKD-EPI: 130 ML/MIN/1.73 M 2
GLOBULIN SER CALC-MCNC: 1.6 G/DL (ref 1.9–3.5)
GLUCOSE SERPL-MCNC: 138 MG/DL (ref 65–99)
HCT VFR BLD AUTO: 21.9 % (ref 42–52)
HGB BLD-MCNC: 7.4 G/DL (ref 14–18)
LYMPHOCYTES # BLD AUTO: 0.43 K/UL (ref 1–4.8)
LYMPHOCYTES NFR BLD: 72.4 % (ref 22–41)
MANUAL DIFF BLD: NORMAL
MCH RBC QN AUTO: 30.7 PG (ref 27–33)
MCHC RBC AUTO-ENTMCNC: 33.8 G/DL (ref 33.7–35.3)
MCV RBC AUTO: 90.9 FL (ref 81.4–97.8)
MONOCYTES # BLD AUTO: 0.01 K/UL (ref 0–0.85)
MONOCYTES NFR BLD AUTO: 0.9 % (ref 0–13.4)
MORPHOLOGY BLD-IMP: NORMAL
NEUTROPHILS # BLD AUTO: 0.16 K/UL (ref 1.82–7.42)
NEUTROPHILS NFR BLD: 26.7 % (ref 44–72)
NRBC # BLD AUTO: 0 K/UL
NRBC BLD-RTO: 0 /100 WBC
OVALOCYTES BLD QL SMEAR: NORMAL
PLATELET # BLD AUTO: 15 K/UL (ref 164–446)
PLATELET BLD QL SMEAR: NORMAL
PLATELETS.RETICULATED NFR BLD AUTO: 7.6 K/UL (ref 0.6–13.1)
PMV BLD AUTO: 10.6 FL (ref 9–12.9)
POIKILOCYTOSIS BLD QL SMEAR: NORMAL
POTASSIUM SERPL-SCNC: 4 MMOL/L (ref 3.6–5.5)
PRODUCT TYPE UPROD: NORMAL
PROT SERPL-MCNC: 4.7 G/DL (ref 6–8.2)
RBC # BLD AUTO: 2.41 M/UL (ref 4.7–6.1)
RBC # CSF: 2 CELLS/UL
RBC BLD AUTO: PRESENT
RH BLD: NORMAL
SMUDGE CELLS BLD QL SMEAR: NORMAL
SODIUM SERPL-SCNC: 135 MMOL/L (ref 135–145)
SPECIMEN VOL CSF: 1 ML
TUBE # CSF: 2
TUBE # CSF: 2
UNIT STATUS USTAT: NORMAL
WBC # BLD AUTO: 0.6 K/UL (ref 4.8–10.8)
WBC # CSF: <1 CELLS/UL (ref 0–10)

## 2022-06-13 PROCEDURE — 88108 CYTOPATH CONCENTRATE TECH: CPT

## 2022-06-13 PROCEDURE — 81335 TPMT GENE COM VARIANTS: CPT

## 2022-06-13 PROCEDURE — 700111 HCHG RX REV CODE 636 W/ 250 OVERRIDE (IP): Mod: JW

## 2022-06-13 PROCEDURE — 99211 OFF/OP EST MAY X REQ PHY/QHP: CPT | Mod: 25

## 2022-06-13 PROCEDURE — 700111 HCHG RX REV CODE 636 W/ 250 OVERRIDE (IP): Performed by: PEDIATRICS

## 2022-06-13 PROCEDURE — 700105 HCHG RX REV CODE 258

## 2022-06-13 PROCEDURE — 80053 COMPREHEN METABOLIC PANEL: CPT

## 2022-06-13 PROCEDURE — 81306 NUDT15 GENE COMMON VARIANTS: CPT

## 2022-06-13 PROCEDURE — 89051 BODY FLUID CELL COUNT: CPT

## 2022-06-13 PROCEDURE — 85025 COMPLETE CBC W/AUTO DIFF WBC: CPT

## 2022-06-13 PROCEDURE — 306780 HCHG STAT FOR TRANSFUSION PER CASE

## 2022-06-13 PROCEDURE — 77072 BONE AGE STUDIES: CPT

## 2022-06-13 PROCEDURE — 96375 TX/PRO/DX INJ NEW DRUG ADDON: CPT | Mod: XU

## 2022-06-13 PROCEDURE — 86901 BLOOD TYPING SEROLOGIC RH(D): CPT

## 2022-06-13 PROCEDURE — 700101 HCHG RX REV CODE 250: Performed by: PEDIATRICS

## 2022-06-13 PROCEDURE — 82657 ENZYME CELL ACTIVITY: CPT | Mod: 91

## 2022-06-13 PROCEDURE — 99215 OFFICE O/P EST HI 40 MIN: CPT | Performed by: PEDIATRICS

## 2022-06-13 PROCEDURE — 36430 TRANSFUSION BLD/BLD COMPNT: CPT | Mod: XU

## 2022-06-13 PROCEDURE — 36592 COLLECT BLOOD FROM PICC: CPT

## 2022-06-13 PROCEDURE — 700105 HCHG RX REV CODE 258: Performed by: PEDIATRICS

## 2022-06-13 PROCEDURE — 85055 RETICULATED PLATELET ASSAY: CPT

## 2022-06-13 PROCEDURE — P9034 PLATELETS, PHERESIS: HCPCS

## 2022-06-13 PROCEDURE — 86900 BLOOD TYPING SEROLOGIC ABO: CPT

## 2022-06-13 PROCEDURE — 86945 BLOOD PRODUCT/IRRADIATION: CPT

## 2022-06-13 PROCEDURE — 85007 BL SMEAR W/DIFF WBC COUNT: CPT

## 2022-06-13 PROCEDURE — 96409 CHEMO IV PUSH SNGL DRUG: CPT

## 2022-06-13 PROCEDURE — 96411 CHEMO IV PUSH ADDL DRUG: CPT

## 2022-06-13 PROCEDURE — 86850 RBC ANTIBODY SCREEN: CPT

## 2022-06-13 PROCEDURE — 96450 CHEMOTHERAPY INTO CNS: CPT

## 2022-06-13 RX ORDER — ACETAMINOPHEN 325 MG/1
650 TABLET ORAL PRN
Status: CANCELLED | OUTPATIENT
Start: 2022-06-14

## 2022-06-13 RX ORDER — ONDANSETRON 2 MG/ML
8 INJECTION INTRAMUSCULAR; INTRAVENOUS EVERY 8 HOURS PRN
Status: DISCONTINUED | OUTPATIENT
Start: 2022-06-13 | End: 2022-06-14 | Stop reason: HOSPADM

## 2022-06-13 RX ORDER — ONDANSETRON 2 MG/ML
8 INJECTION INTRAMUSCULAR; INTRAVENOUS ONCE
Status: COMPLETED | OUTPATIENT
Start: 2022-06-13 | End: 2022-06-13

## 2022-06-13 RX ORDER — EPINEPHRINE 1 MG/ML(1)
0.5 AMPUL (ML) INJECTION PRN
Status: CANCELLED | OUTPATIENT
Start: 2022-06-13

## 2022-06-13 RX ORDER — PROMETHAZINE HYDROCHLORIDE 25 MG/1
25 TABLET ORAL EVERY 6 HOURS PRN
Status: DISCONTINUED | OUTPATIENT
Start: 2022-06-13 | End: 2022-06-14 | Stop reason: HOSPADM

## 2022-06-13 RX ORDER — DIPHENHYDRAMINE HYDROCHLORIDE 50 MG/ML
50 INJECTION INTRAMUSCULAR; INTRAVENOUS PRN
Status: CANCELLED | OUTPATIENT
Start: 2022-06-13

## 2022-06-13 RX ORDER — DIPHENHYDRAMINE HYDROCHLORIDE 50 MG/ML
25 INJECTION INTRAMUSCULAR; INTRAVENOUS PRN
Status: CANCELLED | OUTPATIENT
Start: 2022-06-14

## 2022-06-13 RX ORDER — IMATINIB MESYLATE 400 MG/1
600 TABLET, FILM COATED ORAL DAILY
Qty: 45 TABLET | Refills: 0 | Status: SHIPPED | OUTPATIENT
Start: 2022-06-13 | End: 2022-06-14 | Stop reason: SDUPTHER

## 2022-06-13 RX ORDER — LORAZEPAM 2 MG/ML
1 INJECTION INTRAMUSCULAR EVERY 6 HOURS PRN
Status: DISCONTINUED | OUTPATIENT
Start: 2022-06-13 | End: 2022-06-14 | Stop reason: HOSPADM

## 2022-06-13 RX ORDER — LIDOCAINE AND PRILOCAINE 25; 25 MG/G; MG/G
CREAM TOPICAL PRN
Status: DISCONTINUED | OUTPATIENT
Start: 2022-06-13 | End: 2022-06-14 | Stop reason: HOSPADM

## 2022-06-13 RX ORDER — MIDAZOLAM HYDROCHLORIDE 1 MG/ML
2 INJECTION INTRAMUSCULAR; INTRAVENOUS ONCE
Status: COMPLETED | OUTPATIENT
Start: 2022-06-13 | End: 2022-06-13

## 2022-06-13 RX ADMIN — LIDOCAINE AND PRILOCAINE 5 G: 25; 25 CREAM TOPICAL at 16:26

## 2022-06-13 RX ADMIN — DAUNORUBICIN HYDROCHLORIDE 44 MG: 5 INJECTION, SOLUTION INTRAVENOUS at 17:47

## 2022-06-13 RX ADMIN — MIDAZOLAM HYDROCHLORIDE 2 MG: 1 INJECTION, SOLUTION INTRAMUSCULAR; INTRAVENOUS at 17:10

## 2022-06-13 RX ADMIN — ONDANSETRON HYDROCHLORIDE 8 MG: 2 SOLUTION INTRAMUSCULAR; INTRAVENOUS at 17:04

## 2022-06-13 RX ADMIN — VINCRISTINE SULFATE 2 MG: 1 INJECTION, SOLUTION INTRAVENOUS at 17:40

## 2022-06-13 RX ADMIN — METHOTREXATE 12 MG: 25 INJECTION INTRA-ARTERIAL; INTRAMUSCULAR; INTRATHECAL; INTRAVENOUS at 17:19

## 2022-06-13 ASSESSMENT — FIBROSIS 4 INDEX: FIB4 SCORE: 5.83

## 2022-06-13 NOTE — PROGRESS NOTES
"Pharmacy Chemotherapy Calculations Note:    Dx: B-ALL, PH+    Cycle:  Induction Day 15   Previous treatment: Induction Day 8 on 6/7/22     Protocol: Induction 1A part 2 per [id number] (on [id number] phase 3 trial starting D15)             /51   Pulse (!) 111   Temp 36.7 °C (98.1 °F) (Temporal)   Resp 20   Ht 1.65 m (5' 4.96\")   Wt 67.9 kg (149 lb 11.1 oz)   SpO2 100%   BMI 24.94 kg/m²  Body surface area is 1.76 meters squared.  Tx Plan Values for induction starting 6/13/22: Ht 165 cm Wt 67.9 kg BSA 1.76 m²    Labs from 6/13/22 reviewed - no hold parameters for this treatment day, pt is getting plts before LP. Okay to proceed with Day 15 per Dr Faye.       IT methotrexate 12 mg fixed dose for age   <10% difference, okay to treat with final dose = 12 mg IT    Vincristine 1.5 mg/m² (max 2 mg) x 1.76 m² = 2.64 mg   okay to treat with max dose = 2 mg IV    Daunorubicin 25 mg/m² x 1.76 m² = 44 mg   <10% difference, okay to treat with final dose = 44 mg IV    Judy Bryant, PharmD, BCOP             "

## 2022-06-13 NOTE — PROGRESS NOTES
Martha from Lab called with critical result of WBC 0.6, hemoglobin 7.4, and platelets 15 at 1312. Critical lab result read back to Martha.   Dr. Faye notified of critical lab result at 1313.  Critical lab result read back by Dr. Faye.

## 2022-06-13 NOTE — PROGRESS NOTES
PT to [facility] for Dr farideh, LP and chemo, accompanied by mother.  Afebrile.  VSS. PICC accessed and labs drawn.  Dressing changed completed. Skin intact, no s/s of infection. PT tolerated well.     Dr. Faye at bedside to speak with pt. Visit completed.   Xray to bedside for bone study.   Critical labs received. Cherrie Urbano RN notified MD.   Pt needing platelet transfusion. COD drawn and sent to blood bank.     Platelet: Donor # [id number] V started at 1537    Total volume infused: 311ml    Vital signs monitored per protocol. Transfusion completed at 1705 and PT tolerated well.     Verified patency prior to procedure.  Procedure performed by Dr. Faye.      Start Time: 1711    Monitored PT q5min and documented VS q10min per protocol.  LP completed at 1719.   See MAR for medication adminsitration.  No unexpected events.  PT woke from sedation without complications.      Stop time: 1719    Pt to [facility] for lab draw, doctors office visit, and chemotherapy administration.      Afebrile.  VSS.  Awake and alert in no acute distress.        Chemotherapy dosage calculated independently by Tammie Covington RN and Cherrie Urbano RN and compared to road map for protocol [id number].  Calculations within 10% of written order.  Lab results reviewed.      Premedications and chemo given as ordered, see MAR.  Blood return verified prior to, during, and after chemotherapy infusion.  See Chemotherapy flowsheet.  PT tolerated well.  No side effects or complications noted.      PT tolerated regular diet and ambulated independently.  PICC flushed per orders. Pt instructed that results will be made available to the ordering provider and to contact that provider for follow-up.  Discharged home with mother once discharge criteria met. Follow up instructions given.  Home with mother.  Next appointment scheduled on 6/14/22.

## 2022-06-13 NOTE — PROGRESS NOTES
"Pharmacy Chemotherapy Verification Note:    Dx: HR B-ALL        Protocol and Dosing Reference: Induction IA part 2([id number])      Allergies:  Amoxicillin     /51   Pulse (!) 111   Temp 36.7 °C (98.1 °F) (Temporal)   Resp 20   Ht 1.65 m (5' 4.96\")   Wt 67.9 kg (149 lb 11.1 oz)   SpO2 100%   BMI 24.94 kg/m²  Body surface area is 1.76 meters squared.    All lab results 6/13/22 reviewed. No treatment HOLD parameters for day 15. PLt transfusion prior to LP.  5/30/22 ECHO LVEF 75%     Drug Order   (Drug name, dose, route, IV Fluid & volume, frequency, number of doses) Cycle: induction Day 15  Previous treatment: Induction D8 6/7/22     Medication = methotrexate  Base Dose = 12mg fixed dose for age  Calc Dose: no calc req'd  Final Dose = 12mg  Route = intraTHECAL  Fluid & Volume = pfNS 6 mL  Admin Duration = IT inj by MD only   To be given by MD      <10% difference, okay to treat with final dose      Medication = vincristine  Base Dose = 1.5 mg/m2 (MAX 2 mg)  Calc Dose: Base Dose x 1.76m2 > 2mg  Final Dose = 2 mg  Route = IV  Fluid & Volume = NS 25 mL  Admin Duration = Over 5-10 mins          Ok to treat with max dose   Medication = daunorubicin  Base Dose = 25 mg/m2  Calc Dose: Base Dose x 1.76m2 = 44mg  Final Dose = 44mg  Route = IV  Fluid & Volume = NS 25 mL  Admin Duration = Over 15 mins          <10% difference, okay to treat with final dose     By my signature below, I confirm this process was performed independently with the BSA and all final chemotherapy dosing calculations congruent. I have reviewed the above chemotherapy order and that my calculation of the final dose and BSA (when applicable) corroborate those calculations of the  pharmacist.     JAE Wilson, Pharm.D.      "

## 2022-06-13 NOTE — H&P
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     Treatment Plan:   [id number](OS), Induction 1A Part 2, Day 15     Treatment Ht/Wt/BSA 6/13/2022    Height: 1.65 m         Weight: 67.9 kg          BSA: 1.76 m²                                                        Performance Status: Karnofsky 70, ECOG  2     Baseline ECHO/EKG: EF 75%, SF 42%, QTc 413 ms     Current Therapeutic Medications:  (verified 100% compliance)      Imatinib 400 mg PO AM, Imatinib 200 mg PO PM (start 6/3/2022)   Prednisone 60 mg PO BID (start 5/30/2022)     Evaluations / Study Labs (obtained prior to start of Day 15 therapy 6/13/2022):     WBC 0.6, Hgb 7.4, platelets 15  ,  - no blasts noted on peripheral smear     Total bilirubin 1.0, ALT 70, creatinine 0.78     Baseline ECG/ECHO obtained 5/28/2022 and 5/30/2022    TMPT/NUDT15 sent and PENDING    Enrolled on [id number] - Ph+ central review performed    Bone age: Skeletal maturation is complete (6/13/2022)    CSF with < 1 WBC, RBC 2     Therapy Given (6/13/2022):    Methotrexate 12 mg IT (CNS3c)  Vincristine 2 mg IV  Daunorubicin 44.8 mg IV    Continuation of Imatinib with change of dose/frequency for [id number] -->  Imatinib 600 mg (rounded from 598.4 mg = 340 mg/m2) PO daily (1.5 tablets)       Disposition: RTC for PRBC transfusion tomorrow, then 1 week for Day 22 of Induction 1A Part 2 ON STUDY [id number]      Pepe Faye MD  Pediatric Hematology / Oncology  [facility]  Cell.  [phone]  Office. [phone]

## 2022-06-14 ENCOUNTER — PHARMACY VISIT (OUTPATIENT)
Dept: PHARMACY | Facility: MEDICAL CENTER | Age: 22
End: 2022-06-14
Payer: COMMERCIAL

## 2022-06-14 ENCOUNTER — HOSPITAL ENCOUNTER (OUTPATIENT)
Dept: INFUSION CENTER | Facility: MEDICAL CENTER | Age: 22
End: 2022-06-14
Attending: PEDIATRICS
Payer: COMMERCIAL

## 2022-06-14 VITALS
TEMPERATURE: 97.9 F | HEIGHT: 65 IN | WEIGHT: 150.13 LBS | BODY MASS INDEX: 25.01 KG/M2 | RESPIRATION RATE: 20 BRPM | SYSTOLIC BLOOD PRESSURE: 126 MMHG | HEART RATE: 75 BPM | DIASTOLIC BLOOD PRESSURE: 73 MMHG | OXYGEN SATURATION: 98 %

## 2022-06-14 DIAGNOSIS — C91.00 PHILADELPHIA CHROMOSOME POSITIVE ACUTE LYMPHOBLASTIC LEUKEMIA (ALL) (HCC): ICD-10-CM

## 2022-06-14 DIAGNOSIS — R11.2 CINV (CHEMOTHERAPY-INDUCED NAUSEA AND VOMITING): ICD-10-CM

## 2022-06-14 DIAGNOSIS — T45.1X5A CINV (CHEMOTHERAPY-INDUCED NAUSEA AND VOMITING): ICD-10-CM

## 2022-06-14 DIAGNOSIS — T45.1X5A ANEMIA DUE TO ANTINEOPLASTIC CHEMOTHERAPY: ICD-10-CM

## 2022-06-14 DIAGNOSIS — D64.81 ANEMIA DUE TO ANTINEOPLASTIC CHEMOTHERAPY: ICD-10-CM

## 2022-06-14 LAB
BARCODED ABORH UBTYP: 5100
BARCODED PRD CODE UBPRD: NORMAL
BARCODED UNIT NUM UBUNT: NORMAL
COMPONENT R 8504R: NORMAL
PRODUCT TYPE UPROD: NORMAL
UNIT STATUS USTAT: NORMAL

## 2022-06-14 PROCEDURE — 36430 TRANSFUSION BLD/BLD COMPNT: CPT

## 2022-06-14 PROCEDURE — RXMED WILLOW AMBULATORY MEDICATION CHARGE: Performed by: PEDIATRICS

## 2022-06-14 PROCEDURE — 700102 HCHG RX REV CODE 250 W/ 637 OVERRIDE(OP): Performed by: PEDIATRICS

## 2022-06-14 PROCEDURE — A9270 NON-COVERED ITEM OR SERVICE: HCPCS | Performed by: PEDIATRICS

## 2022-06-14 PROCEDURE — P9016 RBC LEUKOCYTES REDUCED: HCPCS

## 2022-06-14 PROCEDURE — 86945 BLOOD PRODUCT/IRRADIATION: CPT

## 2022-06-14 PROCEDURE — 86923 COMPATIBILITY TEST ELECTRIC: CPT

## 2022-06-14 PROCEDURE — 306780 HCHG STAT FOR TRANSFUSION PER CASE

## 2022-06-14 PROCEDURE — 99213 OFFICE O/P EST LOW 20 MIN: CPT | Performed by: PEDIATRICS

## 2022-06-14 RX ORDER — DIPHENHYDRAMINE HYDROCHLORIDE 50 MG/ML
25 INJECTION INTRAMUSCULAR; INTRAVENOUS PRN
Status: CANCELLED | OUTPATIENT
Start: 2022-06-14

## 2022-06-14 RX ORDER — SCOLOPAMINE TRANSDERMAL SYSTEM 1 MG/1
1 PATCH, EXTENDED RELEASE TRANSDERMAL
Status: DISCONTINUED | OUTPATIENT
Start: 2022-06-14 | End: 2022-06-15 | Stop reason: HOSPADM

## 2022-06-14 RX ORDER — IMATINIB MESYLATE 400 MG/1
600 TABLET, FILM COATED ORAL DAILY
Qty: 45 TABLET | Refills: 5 | Status: SHIPPED | OUTPATIENT
Start: 2022-06-14 | End: 2022-07-14

## 2022-06-14 RX ORDER — ACETAMINOPHEN 325 MG/1
650 TABLET ORAL PRN
Status: CANCELLED | OUTPATIENT
Start: 2022-06-14

## 2022-06-14 RX ADMIN — SCOPALAMINE 1 PATCH: 1 PATCH, EXTENDED RELEASE TRANSDERMAL at 08:51

## 2022-06-14 ASSESSMENT — FIBROSIS 4 INDEX: FIB4 SCORE: 2.39

## 2022-06-14 NOTE — PROCEDURES
Pediatric Oncology Lumbar Puncture  Procedure Note      Patient Name:  Tomas Jean-Baptiste  : 2000   MRN: [id number]    Service Location:  [facility]  Date of Service: 2022  Time: 5:11 PM    Procedure Performed By: Pepe Faye M.D.    Pre-procedural Diagnosis:  Ph+ Acute B-Lymphoblastic Leukemia not yet having achieved remission    Post-procedural Diagnosis:  Ph+ Acute B-Lymphoblastic Leukemia nodular having achieved remission    Procedure:  Lumbar Puncture with administration of intrathecal chemotherapy    Sedation/Anxiolysis/Analgesia: Versed 2 mg IV x 1 dose, EMLA cream      Intrathecal Chemotherapy:  Yes    Chemotherapy Administered:  Methotrexate 12 mg IT (in 6 mL NS)    Needle Size:  22 gauge, 3.5 in  Site: L3-L4  Number of Attempts: 1  Fluid:  6 ml clear fluid obtained  Labs: Cell count, cytology    Complications:  None    Procedure Note:    Tomas Jean-Baptiste is a 21 y.o. male diagnosed with Ph+ Acute B-Lymphoblastic Leukemia not yet having achieved remission.  He presents today for scheduled chemotherapy, Induction 1A Part 2, Day 15 ON STUDY [id number] withscheduled lumbar puncture with intrathecal chemotherapy for CS3c status.  Prior to the procedure, the risks and benefits were discussed with the patient.  Consent for the procedure was signed by patient himself placed in the patient's chart.  All pertinent labs and history were reviewed and a complete History and Physical Examination were performed and placed in the medical record.  All necessary safety equipment per ASA guidelines were available.  A time-out was performed and the patient identified by name,  and medical record number.  Gloves and mask were worn during the entire procedure.  Tomas Roy was prepped and draped in the usual sterile fashion with povoiodine.  He was positioned in the left decubitus position and all landmarks including superior posterior iliac  crest, umbilicus and vertebral bodies were identified by palpation.  A 3.5 in, 22 gauge spinal needle was introduced into the L3-L4 spinal interspace.  6 ml of clear fluid were obtained and sent for cell count and cytology.  Methotrexate 12 mg in 6 mL of NS (age appropriate per [id number])  was verified with the nurse bedside and then then administered into the spinal fluid. JULY tolerated the procedure well without any complications or bleeding.    Results:    PENDING    Pepe Faye MD  Pediatric Hematology / Oncology  [facility]  Cell.  [phone]

## 2022-06-14 NOTE — PROGRESS NOTES
Pediatric Hematology / Oncology  Progress Note      Patient Name:  Tomas Jean-Baptiste  : 2000   MRN: [id number]    Location of Service:  [facility]  Date of Service: 2022  Time: 10:02 AM    Primary Care Physician: Margarito Arvizu M.D.    Protocol/Treatment Plan:  [place] Chromosome Precursor B-Cell Acute Lymphoblastic Leukemia, ON STUDY [id number], Induction IA Part 2, Day 16    HISTORY OF PRESENT ILLNESS:     Chief Complaint: Scheduled blood transfusion    History of Present Illness: Tomas Jean-Baptiste is a 21 y.o. male who presents to the [facility] for scheduled transfusion of PRBCs.  Today is Day 16 of Induction IA Part 2.   Tomas Roy presents to clinic by himself today.  He provides accurate overnight and interval history.    No changes to HPI overnight, see note from 2022.    This morning in addition to fatigue, Tomas Roy reports worsened nausea but without vomiting.  He continues to complain of abdominal discomfort as well as variable stool consistency.  No fevers however and no signs of acute illness.  He does report that he did not have any back pain or headaches overnight but this morning upon coming to clinic he does report a new onset of headache.  No complaints of any shortness of breath or difficulty breathing.  No complaints this morning no palpitations.  No other concerns or complaints at this time.    Review of Systems:     Constitutional:  Afebrile. No remote or acute illness.  Energy decreased as above.  Oral intake decreased as his appetite.  HENT: Does not complain of any ear pain or muffled hearing however this morning is using earplugs as he does endorse some phonophobia.  Eyes: Stable.  Diplopia.  Blurred vision.  Respiratory: Negative for shortness of breath.  Cardiovascular: Negative.  Gastrointestinal: Moderate and slightly worsened nausea without  vomiting.  Abdominal upset and discomfort.  Genitourinary: Negative for dysuria or flank pain.    Musculoskeletal: Negative for joint or muscle pain.   Skin: Negative for rash, signs of infection.  Neurological: Negative for numbness, tingling, sensory changes, weakness or headaches.    Endo/Heme/Allergies: Does not bruise/bleed easily.    Psychiatric/Behavioral: No changes in mood, appropriate for age.     PAST MEDICAL HISTORY:     Oncologic History:  2-3 week history of worsening fatigue, right lower extremity pain  Presentation to OS and diagnosed with right LE superficial thrombus, subsegmental PE and hyperleukocytosis, anemia and thrombocytopenia  Transferred to [facility] for definitive care  Presenting (local) WBC > 440K, Hgb 10.0, platelets 53, (automated differential ANC 3190, ALC 75,310, absolute monocyte count 84470, absolute blast count 340,560)  Uric Acid 15.6, phosphorus 5.6, LDH 1114  Rasburicase x 1 dose given   Peripheral Blood flow cytometry demonstrating CD10 pos, CD19 pos, CD20 neg, CD22 dim (60%) 5/28/2022     Peripheral blood FISH for BCR-ABL1 positive in 98% of analyzed cells     Age at Diagnosis: 21 years  White Blood Cell Count at Presentation: > 440 k/uL  Testicular Disease Status: Negative (see procedure note 5/30/2022)  CNS Status: 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, diagnostic LP with WBC 1, RBC 3 and no evidence of leukemic blasts 5/30/2022  Steroid Pre-treatment: None  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 by peripheral flow cytometry 5/28/2022     All inclusion/exclusion criteria for [id number] met and consent signed - enrolled 5/29/2022      All inclusion/exclusion criteria for [id number] met and consent signed - enrolled 5/30/2022  Confirmatory bone marrow aspirate and biopsy and diagnostic LP + cytarabine 70 mg IT 5/30/2022  Induction therapy (ON STUDY [id number]) started 5/30/2022     Bone marrow immunohistochemistry consistent with diagnosis  of B-ALL comprising 90% of marrow cellularity  Bone marrow sample sent to [facility] for [facility] purposes:  Flow cytometry consistent with peripheral blood, cytogenetics remarkable for known t(9;22)  CSF with WBC 1, RBC 3, no blasts identified on cytospin     FISH results available 5/31/2022 making patient eligible for transfer from [place] [address] to [place] [address] as eligibility requirements were met for [place] [address]  Patient unenrolled from [place] [address] (BCR-ABL1 fusion positive) 6/1/2022     Consent signed for [place] [address] and patient enrolled 6/1/2022 (see eligibility checklist from 5/31/2022 and consent note from 6/1/2022)     Imatinib 400 mg PO QAM / 200 mg PO QPM started 6/3/2022 (allowed per [place] [address])     Patient completed the first 15 days of a Standard 4-drug Induction on 6/13/2022     Start of [place] [address](OS), Induction IA Part 2, Day 15 6/13/2022      Past Medical History:    1) Previously Healthy  2) 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  Summer internship currently.  Two dogs.  Everyone is well in the house. Father not involved.     Family History:     Family History             Family History   Problem Relation Age of Onset   • No Known Problems Mother     • Diabetes Paternal Grandfather     • Hypertension Paternal Grandfather     • Hyperlipidemia Paternal Grandfather     • Cancer Neg Hx     • Heart Disease Neg Hx     • Stroke Neg Hx           No  significant family history of cancer.  Both maternal and paternal family history of diabetes.     Immunizations:  UTD    Medications:   Current Outpatient Medications on File Prior to Encounter   Medication Sig Dispense Refill   • imatinib (GLEEVEC) 400 MG tablet Take 1.5 Tablets by mouth every day for 30 days. 45 Tablet 0   • LORazepam (ATIVAN) 1 MG Tab Take 1 Tablet by mouth every 6 hours as needed (nausea) for up to 10 days. 40 Tablet 0   • ondansetron (ZOFRAN ODT) 8 MG TABLET DISPERSIBLE Dissovle 1 Tablet by mouth every 8 hours as needed for Nausea. 20 Tablet 0   • sulfamethoxazole-trimethoprim (BACTRIM) 400-80 MG Tab Take 2 tablets by mouth twice daily every Saturday and Sunday 40 Tablet 11   • senna-docusate (PERICOLACE OR SENOKOT S) 8.6-50 MG Tab Take 2 Tablets by mouth every day. 40 Tablet 1   • predniSONE (DELTASONE) 20 MG Tab Take 3 Tablets by mouth 2 times a day for 22 days. 132 Tablet 0   • polyethylene glycol/lytes (MIRALAX) 17 g Pack Mix 1 Packet per package instructions and drink by mouth 1 time a day as needed (if sennosides and docusate ineffective after 24 hours). 10 Each 3   • famotidine (PEPCID) 20 MG Tab Take 1 Tablet by mouth 2 times a day. 60 Tablet 1   • apixaban (ELIQUIS) 5mg Tab Take 2 Tablets by mouth 2 times a day. Starting June 9th, take 1 tablet 2 times a day thereafter. Indications: DVT/PE 70 Tablet 2   • Sodium Chloride Flush (NORMAL SALINE FLUSH) 0.9 % Solution Infuse 10 mL into a venous catheter every 12 hours. 600 mL 0   • ascorbic acid (ASCORBIC ACID) 500 MG Tab Take 500 mg by mouth every day.     • therapeutic multivitamin-minerals (THERAGRAN-M) Tab Take 1 Tab by mouth every day.     • ammonium lactate (LAC-HYDRIN) 12 % Lotion Apply to peeling areas of feet twice daily as needed 500 g 0     No current facility-administered medications on file prior to encounter.       OBJECTIVE:     Vitals:   /59   Pulse 66   Temp 36.6 °C (97.9 °F) (Temporal)   Resp 20   Ht 1.65 m (5'  "4.96\")   Wt 68.1 kg (150 lb 2.1 oz)   SpO2 100%     Labs:  Hospital Outpatient Visit on 06/14/2022   Component Date Value   • Component R 06/13/2022                      Value:RI6                 RedBloodCellsIRR    [id number]   issued       06/14/22   08:48     • Product Type 06/13/2022 Red Blood Cells IRR LR Pheresis    • Dispense Status 06/13/2022 issued    • Unit Number (Barcoded) 06/13/2022 [id number]    • Product Code (Barcoded) 06/13/2022 [id number]    • Blood Type (Barcoded) 06/13/2022 5100    Hospital Outpatient Visit on 06/13/2022   Component Date Value   • WBC 06/13/2022 0.6 (A)   • RBC 06/13/2022 2.41 (A)   • Hemoglobin 06/13/2022 7.4 (A)   • Hematocrit 06/13/2022 21.9 (A)   • MCV 06/13/2022 90.9    • MCH 06/13/2022 30.7    • MCHC 06/13/2022 33.8    • RDW 06/13/2022 46.0    • Platelet Count 06/13/2022 15 (A)   • MPV 06/13/2022 10.6    • Neutrophils-Polys 06/13/2022 26.70 (A)   • Lymphocytes 06/13/2022 72.40 (A)   • Monocytes 06/13/2022 0.90    • Eosinophils 06/13/2022 0.00    • Basophils 06/13/2022 0.00    • Nucleated RBC 06/13/2022 0.00    • Neutrophils (Absolute) 06/13/2022 0.16 (A)   • Lymphs (Absolute) 06/13/2022 0.43 (A)   • Monos (Absolute) 06/13/2022 0.01    • Eos (Absolute) 06/13/2022 0.00    • Baso (Absolute) 06/13/2022 0.00    • NRBC (Absolute) 06/13/2022 0.00    • Sodium 06/13/2022 135    • Potassium 06/13/2022 4.0    • Chloride 06/13/2022 102    • Co2 06/13/2022 22    • Anion Gap 06/13/2022 11.0    • Glucose 06/13/2022 138 (A)   • Bun 06/13/2022 25 (A)   • Creatinine 06/13/2022 0.78    • Calcium 06/13/2022 7.5 (A)   • AST(SGOT) 06/13/2022 15    • ALT(SGPT) 06/13/2022 70 (A)   • Alkaline Phosphatase 06/13/2022 49    • Total Bilirubin 06/13/2022 1.0    • Albumin 06/13/2022 3.1 (A)   • Total Protein 06/13/2022 4.7 (A)   • Globulin 06/13/2022 1.6 (A)   • A-G Ratio 06/13/2022 1.9    • Number Of Tubes 06/13/2022 2    • Volume 06/13/2022 1.0    • Color-Body Fluid 06/13/2022 Colorless    • " Character-Body Fluid 06/13/2022 Clear    • Supernatant Appearance 06/13/2022 Colorless    • Total RBC Count 06/13/2022 2    • Crenated RBC 06/13/2022 0    • Total WBC Count 06/13/2022 <1    • Comments 06/13/2022 see below    • CSF Tube Number 06/13/2022 2    • GFR (CKD-EPI) 06/13/2022 130    • Manual Diff Status 06/13/2022 PERFORMED    • Peripheral Smear Review 06/13/2022 see below    • Plt Estimation 06/13/2022 Marked Decrease    • RBC Morphology 06/13/2022 Present    • Poikilocytosis 06/13/2022 1+    • Ovalocytes 06/13/2022 1+    • Smudge Cells 06/13/2022 Moderate    • Imm. Plt Fraction 06/13/2022 7.6    • ABO Grouping Only 06/13/2022 O    • Rh Grouping Only 06/13/2022 POS    • Antibody Screen-Cod 06/13/2022 NEG    • Component P 06/13/2022                      Value:P37                 Plts,PheresisIRR    [id number]   transfused   06/13/22   15:27     • Product Type 06/13/2022 Platelets  Pheresis IRR LR    • Dispense Status 06/13/2022 transfused    • Unit Number (Barcoded) 06/13/2022 [id number]    • Product Code (Barcoded) 06/13/2022 [id number]    • Blood Type (Barcoded) 06/13/2022 7300    • Cytology Reg 06/13/2022 See Path Report        Physical Exam:    Constitutional: Well-developed, well-nourished, and in no distress.  Very tired appearing.  HENT: Normocephalic and atraumatic. No nasal congestion or rhinorrhea. Oropharynx is clear and moist.   Eyes: Conjunctivae are normal. Pupils are equal, round.  No longer squinting.  Improved disconjugate gaze.  Cardiovascular: Normal rate, regular rhythm and normal heart sounds.  No murmur heard. DP/radial pulses 2+, cap refill < 2 sec.  Pulmonary/Chest: Effort normal and breath sounds normal. No respiratory distress. Symmetric expansion.  No crackles or wheezes.  Genitourinary:  Deferred.  Musculoskeletal: Normal range of motion of lower and upper extremities bilaterally.  Neurological: Alert and oriented to person and place. Exhibits normal muscle tone bilaterally  in upper and lower extremities. Gait normal. Coordination normal.    Skin: Skin is warm, dry and pink.  No rash or evidence of skin infection.  No pallor.   Psychiatric: Mood flat.    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     1) Therapy and Disease Related Anemia:   - Hgb 7.4 yesterday, moderately symptomatic   - Plan to transfuse 1 unit irradiated PRBCs today   - Continue to monitor Hgb with CBCs, possible transfusion again next Monday   - Transfuse for Hgb less than 7 or symptomatic    2) Chemotherapy Induced Nausea and Vomiting:   - Slightly worse overnight following chemotherapy   - Added scopolamine patch every 72 hours to antiemetic regimen   - Continue Zofran scheduled at home, Ativan as needed until improvement in nausea   - Consider addition of olanzapine    3) Headache:   - Most likely related to anemia   - Possibly related to spinal tap yesterday   - Additional possibility of Zofran induced headache   - Continue to monitor and provide supportive care      Pepe Faye MD  Pediatric Hematology / Oncology  [facility]  Cell.  [phone]  Office. [phone]

## 2022-06-14 NOTE — PROGRESS NOTES
[facility] and MD visit. Afebrile.  VSS. PICC line assessed. PT tolerated well.     Dr. Faye at bedside to speak with pt regarding plan of care for today. MD visit completed.  Scopolamine patch applied to back of left ear at 0851.    PRBC: Donor # [id number] started at 0907    Total volume infused:353    Vital signs monitored per protocol. Transfusion completed at 1138 and PT tolerated well.  PICC line flushed per orders. Follow up instructions given.  Home with mother.  Next appointment scheduled on 6/20/22.

## 2022-06-14 NOTE — ADDENDUM NOTE
Encounter addended by: Pepe Faye M.D. on: 6/14/2022 10:06 AM   Actions taken: Level of Service modified, Clinical Note Signed

## 2022-06-15 ENCOUNTER — TELEPHONE (OUTPATIENT)
Dept: INFUSION CENTER | Facility: MEDICAL CENTER | Age: 22
End: 2022-06-15
Payer: COMMERCIAL

## 2022-06-15 NOTE — TELEPHONE ENCOUNTER
Discharge phone call to pt re: visit on 6/14/22. Parent reports pt is doing well.  No questions or concerns at this time.

## 2022-06-15 NOTE — ADDENDUM NOTE
Encounter addended by: Huong Foster R.N. on: 6/15/2022 3:19 PM   Actions taken: Charge Capture section accepted

## 2022-06-17 RX ORDER — PROMETHAZINE HYDROCHLORIDE 25 MG/1
25 TABLET ORAL EVERY 6 HOURS PRN
Status: CANCELLED | OUTPATIENT
Start: 2022-06-20

## 2022-06-17 RX ORDER — LORAZEPAM 2 MG/ML
1 INJECTION INTRAMUSCULAR EVERY 6 HOURS PRN
Status: CANCELLED | OUTPATIENT
Start: 2022-06-20

## 2022-06-17 RX ORDER — LIDOCAINE AND PRILOCAINE 25; 25 MG/G; MG/G
CREAM TOPICAL PRN
Status: CANCELLED | OUTPATIENT
Start: 2022-06-20

## 2022-06-17 RX ORDER — SODIUM CHLORIDE 9 MG/ML
500 INJECTION, SOLUTION INTRAVENOUS CONTINUOUS
Status: CANCELLED | OUTPATIENT
Start: 2022-06-20

## 2022-06-17 RX ORDER — ONDANSETRON 2 MG/ML
8 INJECTION INTRAMUSCULAR; INTRAVENOUS ONCE
Status: CANCELLED | OUTPATIENT
Start: 2022-06-20

## 2022-06-17 RX ORDER — ONDANSETRON 2 MG/ML
8 INJECTION INTRAMUSCULAR; INTRAVENOUS EVERY 8 HOURS PRN
Status: CANCELLED | OUTPATIENT
Start: 2022-06-20

## 2022-06-18 NOTE — PROGRESS NOTES
"Pharmacy Chemotherapy Calculations Note:    Dx: B-ALL, PH+    Cycle:  Induction Day 22   Previous treatment: Induction Day 15 on 6/13/22     Protocol: Induction 1A part 2 per [id number] (on [id number] phase 3 trial starting D15)             BP (!) 83/44   Pulse 92   Temp 36.8 °C (98.2 °F) (Temporal)   Resp 18   Ht 1.64 m (5' 4.57\")   Wt 63.8 kg (140 lb 10.5 oz)   SpO2 100%   BMI 23.72 kg/m²  Body surface area is 1.7 meters squared.  Tx Plan Values for induction starting 6/13/22: Ht 165 cm Wt 67.9 kg BSA 1.76 m²    Labs from 6/20/22 reviewed - no hold parameters for this treatment day, pt is getting plts before LP. Okay to proceed with Day 22 per Dr Faye.       IT methotrexate 12 mg fixed dose for age   <10% difference, okay to treat with final dose = 12 mg IT    Vincristine 1.5 mg/m² (max 2 mg) x 1.7 m² = 2.55 mg   okay to treat with max dose = 2 mg IV    Daunorubicin 25 mg/m² x 1.7 m² = 42.5 mg   <10% difference, okay to treat with final dose = 44 mg IV    Marifer Torres, PharmD, BCOP                "

## 2022-06-20 ENCOUNTER — HOSPITAL ENCOUNTER (INPATIENT)
Facility: MEDICAL CENTER | Age: 22
LOS: 1 days | DRG: 837 | End: 2022-06-21
Admitting: PEDIATRICS
Payer: COMMERCIAL

## 2022-06-20 ENCOUNTER — HOSPITAL ENCOUNTER (OUTPATIENT)
Dept: INFUSION CENTER | Facility: MEDICAL CENTER | Age: 22
End: 2022-06-20
Attending: PEDIATRICS
Payer: COMMERCIAL

## 2022-06-20 VITALS
DIASTOLIC BLOOD PRESSURE: 43 MMHG | SYSTOLIC BLOOD PRESSURE: 99 MMHG | WEIGHT: 140.65 LBS | BODY MASS INDEX: 23.43 KG/M2 | RESPIRATION RATE: 18 BRPM | TEMPERATURE: 97.5 F | HEIGHT: 65 IN | HEART RATE: 117 BPM | OXYGEN SATURATION: 99 %

## 2022-06-20 DIAGNOSIS — Z51.11 ENCOUNTER FOR ANTINEOPLASTIC CHEMOTHERAPY: ICD-10-CM

## 2022-06-20 DIAGNOSIS — T45.1X5A CINV (CHEMOTHERAPY-INDUCED NAUSEA AND VOMITING): ICD-10-CM

## 2022-06-20 DIAGNOSIS — I26.99 PE (PULMONARY THROMBOEMBOLISM) (HCC): ICD-10-CM

## 2022-06-20 DIAGNOSIS — I95.9 HYPOTENSION, UNSPECIFIED HYPOTENSION TYPE: ICD-10-CM

## 2022-06-20 DIAGNOSIS — Z01.89 ENCOUNTER FOR LUMBAR PUNCTURE: ICD-10-CM

## 2022-06-20 DIAGNOSIS — R11.2 CINV (CHEMOTHERAPY-INDUCED NAUSEA AND VOMITING): ICD-10-CM

## 2022-06-20 DIAGNOSIS — I82.890 SUPERFICIAL VEIN THROMBOSIS: ICD-10-CM

## 2022-06-20 DIAGNOSIS — C91.Z0 B LYMPHOBLASTIC LEUKEMIA WITH T(9;22)(Q34;Q11.2);BCR-ABL1 (HCC): ICD-10-CM

## 2022-06-20 DIAGNOSIS — C91.00 PRE B-CELL ACUTE LYMPHOBLASTIC LEUKEMIA (ALL) (HCC): ICD-10-CM

## 2022-06-20 LAB
ABO GROUP BLD: NORMAL
ABO GROUP BLD: NORMAL
ALBUMIN SERPL BCP-MCNC: 3 G/DL (ref 3.2–4.9)
ALBUMIN/GLOB SERPL: 2 G/DL
ALP SERPL-CCNC: 51 U/L (ref 30–99)
ALT SERPL-CCNC: 108 U/L (ref 2–50)
ANION GAP SERPL CALC-SCNC: 11 MMOL/L (ref 7–16)
AST SERPL-CCNC: 36 U/L (ref 12–45)
BARCODED ABORH UBTYP: 5100
BARCODED ABORH UBTYP: 5100
BARCODED ABORH UBTYP: 6200
BARCODED PRD CODE UBPRD: NORMAL
BARCODED UNIT NUM UBUNT: NORMAL
BASOPHILS # BLD AUTO: 0 % (ref 0–1.8)
BASOPHILS # BLD: 0 K/UL (ref 0–0.12)
BILIRUB CONJ SERPL-MCNC: 0.5 MG/DL (ref 0.1–0.5)
BILIRUB INDIRECT SERPL-MCNC: 1.6 MG/DL (ref 0–1)
BILIRUB SERPL-MCNC: 2.1 MG/DL (ref 0.1–1.5)
BLD GP AB SCN SERPL QL: NORMAL
BLD GP AB SCN SERPL QL: NORMAL
BUN SERPL-MCNC: 29 MG/DL (ref 8–22)
CALCIUM SERPL-MCNC: 7.8 MG/DL (ref 8.5–10.5)
CHLORIDE SERPL-SCNC: 103 MMOL/L (ref 96–112)
CO2 SERPL-SCNC: 20 MMOL/L (ref 20–33)
COMPONENT P 8504P: NORMAL
COMPONENT R 8504R: NORMAL
COMPONENT R 8504R: NORMAL
CREAT SERPL-MCNC: 0.83 MG/DL (ref 0.5–1.4)
EOSINOPHIL # BLD AUTO: 0 K/UL (ref 0–0.51)
EOSINOPHIL NFR BLD: 0 % (ref 0–6.9)
ERYTHROCYTE [DISTWIDTH] IN BLOOD BY AUTOMATED COUNT: 46.5 FL (ref 35.9–50)
ERYTHROCYTE [DISTWIDTH] IN BLOOD BY AUTOMATED COUNT: 46.8 FL (ref 35.9–50)
ERYTHROCYTE [DISTWIDTH] IN BLOOD BY AUTOMATED COUNT: 47.8 FL (ref 35.9–50)
GFR SERPLBLD CREATININE-BSD FMLA CKD-EPI: 128 ML/MIN/1.73 M 2
GLOBULIN SER CALC-MCNC: 1.5 G/DL (ref 1.9–3.5)
GLUCOSE SERPL-MCNC: 128 MG/DL (ref 65–99)
HCT VFR BLD AUTO: 16.6 % (ref 42–52)
HCT VFR BLD AUTO: 20.2 % (ref 42–52)
HCT VFR BLD AUTO: 21.3 % (ref 42–52)
HGB BLD-MCNC: 5.4 G/DL (ref 14–18)
HGB BLD-MCNC: 6.7 G/DL (ref 14–18)
HGB BLD-MCNC: 7.1 G/DL (ref 14–18)
LYMPHOCYTES # BLD AUTO: 0.18 K/UL (ref 1–4.8)
LYMPHOCYTES NFR BLD: 44.3 % (ref 22–41)
MANUAL DIFF BLD: NORMAL
MCH RBC QN AUTO: 30.3 PG (ref 27–33)
MCH RBC QN AUTO: 30.7 PG (ref 27–33)
MCH RBC QN AUTO: 30.9 PG (ref 27–33)
MCHC RBC AUTO-ENTMCNC: 32.5 G/DL (ref 33.7–35.3)
MCHC RBC AUTO-ENTMCNC: 33.2 G/DL (ref 33.7–35.3)
MCHC RBC AUTO-ENTMCNC: 33.3 G/DL (ref 33.7–35.3)
MCV RBC AUTO: 92.6 FL (ref 81.4–97.8)
MCV RBC AUTO: 92.7 FL (ref 81.4–97.8)
MCV RBC AUTO: 93.3 FL (ref 81.4–97.8)
MISCELLANEOUS LAB RESULT MISCLAB: NORMAL
MONOCYTES # BLD AUTO: 0 K/UL (ref 0–0.85)
MONOCYTES NFR BLD AUTO: 0 % (ref 0–13.4)
MORPHOLOGY BLD-IMP: NORMAL
NEUTROPHILS # BLD AUTO: 0.22 K/UL (ref 1.82–7.42)
NEUTROPHILS NFR BLD: 55.7 % (ref 44–72)
NRBC # BLD AUTO: 0.03 K/UL
NRBC BLD-RTO: 7.7 /100 WBC
NUDT15 GENOTYPE Q5936: NORMAL
PLATELET # BLD AUTO: 18 K/UL (ref 164–446)
PLATELET # BLD AUTO: 22 K/UL (ref 164–446)
PLATELET # BLD AUTO: 8 K/UL (ref 164–446)
PLATELETS.RETICULATED NFR BLD AUTO: 9.4 K/UL (ref 0.6–13.1)
PMV BLD AUTO: 10.6 FL (ref 9–12.9)
PMV BLD AUTO: 11.3 FL (ref 9–12.9)
PMV BLD AUTO: 9.5 FL (ref 9–12.9)
POTASSIUM SERPL-SCNC: 4.2 MMOL/L (ref 3.6–5.5)
PRODUCT TYPE UPROD: NORMAL
PROT SERPL-MCNC: 4.5 G/DL (ref 6–8.2)
RBC # BLD AUTO: 1.78 M/UL (ref 4.7–6.1)
RBC # BLD AUTO: 2.18 M/UL (ref 4.7–6.1)
RBC # BLD AUTO: 2.3 M/UL (ref 4.7–6.1)
RH BLD: NORMAL
RH BLD: NORMAL
SODIUM SERPL-SCNC: 134 MMOL/L (ref 135–145)
TPMT  GENOTYPE Q5935: NORMAL
TPMT GENOTYPE SPEC L312082B: NORMAL
TPMT2 INTERPRETATION Q5937: NORMAL
UNIT STATUS USTAT: NORMAL
WBC # BLD AUTO: 0.3 K/UL (ref 4.8–10.8)
WBC # BLD AUTO: 0.4 K/UL (ref 4.8–10.8)
WBC # BLD AUTO: 0.5 K/UL (ref 4.8–10.8)

## 2022-06-20 PROCEDURE — P9034 PLATELETS, PHERESIS: HCPCS | Mod: 91

## 2022-06-20 PROCEDURE — 36592 COLLECT BLOOD FROM PICC: CPT

## 2022-06-20 PROCEDURE — 700111 HCHG RX REV CODE 636 W/ 250 OVERRIDE (IP): Performed by: PEDIATRICS

## 2022-06-20 PROCEDURE — 86900 BLOOD TYPING SEROLOGIC ABO: CPT

## 2022-06-20 PROCEDURE — 96409 CHEMO IV PUSH SNGL DRUG: CPT

## 2022-06-20 PROCEDURE — 770000 HCHG ROOM/CARE - INTERMEDIATE ICU *

## 2022-06-20 PROCEDURE — 700101 HCHG RX REV CODE 250: Performed by: PEDIATRICS

## 2022-06-20 PROCEDURE — 85055 RETICULATED PLATELET ASSAY: CPT

## 2022-06-20 PROCEDURE — A9270 NON-COVERED ITEM OR SERVICE: HCPCS | Performed by: PEDIATRICS

## 2022-06-20 PROCEDURE — 700105 HCHG RX REV CODE 258: Performed by: PEDIATRICS

## 2022-06-20 PROCEDURE — 85025 COMPLETE CBC W/AUTO DIFF WBC: CPT

## 2022-06-20 PROCEDURE — 96450 CHEMOTHERAPY INTO CNS: CPT | Performed by: PEDIATRICS

## 2022-06-20 PROCEDURE — 700102 HCHG RX REV CODE 250 W/ 637 OVERRIDE(OP): Performed by: PEDIATRICS

## 2022-06-20 PROCEDURE — 80053 COMPREHEN METABOLIC PANEL: CPT

## 2022-06-20 PROCEDURE — 99211 OFF/OP EST MAY X REQ PHY/QHP: CPT

## 2022-06-20 PROCEDURE — P9016 RBC LEUKOCYTES REDUCED: HCPCS

## 2022-06-20 PROCEDURE — 86923 COMPATIBILITY TEST ELECTRIC: CPT

## 2022-06-20 PROCEDURE — 99223 1ST HOSP IP/OBS HIGH 75: CPT | Performed by: PEDIATRICS

## 2022-06-20 PROCEDURE — 96361 HYDRATE IV INFUSION ADD-ON: CPT

## 2022-06-20 PROCEDURE — P9034 PLATELETS, PHERESIS: HCPCS

## 2022-06-20 PROCEDURE — 86945 BLOOD PRODUCT/IRRADIATION: CPT | Mod: 91

## 2022-06-20 PROCEDURE — 86850 RBC ANTIBODY SCREEN: CPT | Mod: 91

## 2022-06-20 PROCEDURE — 36430 TRANSFUSION BLD/BLD COMPNT: CPT

## 2022-06-20 PROCEDURE — 306780 HCHG STAT FOR TRANSFUSION PER CASE

## 2022-06-20 PROCEDURE — 85027 COMPLETE CBC AUTOMATED: CPT | Mod: 91

## 2022-06-20 PROCEDURE — 86901 BLOOD TYPING SEROLOGIC RH(D): CPT | Mod: 91

## 2022-06-20 PROCEDURE — 85007 BL SMEAR W/DIFF WBC COUNT: CPT

## 2022-06-20 PROCEDURE — 86850 RBC ANTIBODY SCREEN: CPT

## 2022-06-20 PROCEDURE — 30233R1 TRANSFUSION OF NONAUTOLOGOUS PLATELETS INTO PERIPHERAL VEIN, PERCUTANEOUS APPROACH: ICD-10-PCS | Performed by: PEDIATRICS

## 2022-06-20 PROCEDURE — 96375 TX/PRO/DX INJ NEW DRUG ADDON: CPT

## 2022-06-20 PROCEDURE — 36430 TRANSFUSION BLD/BLD COMPNT: CPT | Performed by: PEDIATRICS

## 2022-06-20 PROCEDURE — 82248 BILIRUBIN DIRECT: CPT

## 2022-06-20 PROCEDURE — 86901 BLOOD TYPING SEROLOGIC RH(D): CPT

## 2022-06-20 RX ORDER — OLANZAPINE 5 MG/1
5 TABLET ORAL EVERY EVENING
Status: DISCONTINUED | OUTPATIENT
Start: 2022-06-20 | End: 2022-06-21 | Stop reason: HOSPADM

## 2022-06-20 RX ORDER — SODIUM CHLORIDE 9 MG/ML
1000 INJECTION, SOLUTION INTRAVENOUS ONCE
Status: COMPLETED | OUTPATIENT
Start: 2022-06-20 | End: 2022-06-20

## 2022-06-20 RX ORDER — ONDANSETRON 2 MG/ML
8 INJECTION INTRAMUSCULAR; INTRAVENOUS ONCE
Status: COMPLETED | OUTPATIENT
Start: 2022-06-20 | End: 2022-06-20

## 2022-06-20 RX ORDER — MIDAZOLAM HYDROCHLORIDE 1 MG/ML
2 INJECTION INTRAMUSCULAR; INTRAVENOUS ONCE
Status: COMPLETED | OUTPATIENT
Start: 2022-06-20 | End: 2022-06-20

## 2022-06-20 RX ORDER — PROMETHAZINE HYDROCHLORIDE 25 MG/1
25 TABLET ORAL EVERY 6 HOURS PRN
Status: DISCONTINUED | OUTPATIENT
Start: 2022-06-20 | End: 2022-06-21 | Stop reason: HOSPADM

## 2022-06-20 RX ORDER — LORAZEPAM 2 MG/ML
1 INJECTION INTRAMUSCULAR EVERY 4 HOURS PRN
Status: DISCONTINUED | OUTPATIENT
Start: 2022-06-20 | End: 2022-06-21 | Stop reason: HOSPADM

## 2022-06-20 RX ORDER — LORAZEPAM 2 MG/ML
1 INJECTION INTRAMUSCULAR EVERY 6 HOURS PRN
Status: DISCONTINUED | OUTPATIENT
Start: 2022-06-20 | End: 2022-06-20 | Stop reason: DRUGHIGH

## 2022-06-20 RX ORDER — FAMOTIDINE 20 MG/1
20 TABLET, FILM COATED ORAL 2 TIMES DAILY
Status: DISCONTINUED | OUTPATIENT
Start: 2022-06-20 | End: 2022-06-21 | Stop reason: HOSPADM

## 2022-06-20 RX ORDER — MORPHINE SULFATE 4 MG/ML
1 INJECTION INTRAVENOUS ONCE
Status: DISCONTINUED | OUTPATIENT
Start: 2022-06-20 | End: 2022-06-21 | Stop reason: HOSPADM

## 2022-06-20 RX ORDER — ONDANSETRON 2 MG/ML
8 INJECTION INTRAMUSCULAR; INTRAVENOUS EVERY 8 HOURS PRN
Status: DISCONTINUED | OUTPATIENT
Start: 2022-06-20 | End: 2022-06-20 | Stop reason: DRUGHIGH

## 2022-06-20 RX ORDER — PREDNISONE 20 MG/1
60 TABLET ORAL 2 TIMES DAILY
Status: DISCONTINUED | OUTPATIENT
Start: 2022-06-20 | End: 2022-06-21 | Stop reason: HOSPADM

## 2022-06-20 RX ORDER — ONDANSETRON 2 MG/ML
8 INJECTION INTRAMUSCULAR; INTRAVENOUS EVERY 6 HOURS PRN
Status: DISCONTINUED | OUTPATIENT
Start: 2022-06-20 | End: 2022-06-21 | Stop reason: HOSPADM

## 2022-06-20 RX ORDER — LIDOCAINE AND PRILOCAINE 25; 25 MG/G; MG/G
CREAM TOPICAL PRN
Status: DISCONTINUED | OUTPATIENT
Start: 2022-06-20 | End: 2022-06-21 | Stop reason: HOSPADM

## 2022-06-20 RX ADMIN — SODIUM CHLORIDE 1000 ML: 9 INJECTION, SOLUTION INTRAVENOUS at 15:45

## 2022-06-20 RX ADMIN — PREDNISONE 60 MG: 20 TABLET ORAL at 20:59

## 2022-06-20 RX ADMIN — MIDAZOLAM HYDROCHLORIDE 2 MG: 1 INJECTION, SOLUTION INTRAMUSCULAR; INTRAVENOUS at 16:02

## 2022-06-20 RX ADMIN — OLANZAPINE 5 MG: 5 TABLET, FILM COATED ORAL at 20:59

## 2022-06-20 RX ADMIN — FAMOTIDINE 20 MG: 20 TABLET, FILM COATED ORAL at 21:00

## 2022-06-20 RX ADMIN — LORAZEPAM 1 MG: 2 INJECTION INTRAMUSCULAR; INTRAVENOUS at 20:04

## 2022-06-20 RX ADMIN — VINCRISTINE SULFATE 2 MG: 1 INJECTION, SOLUTION INTRAVENOUS at 16:54

## 2022-06-20 RX ADMIN — LIDOCAINE AND PRILOCAINE 1 APPLICATION: 25; 25 CREAM TOPICAL at 15:17

## 2022-06-20 RX ADMIN — ONDANSETRON 8 MG: 2 INJECTION INTRAMUSCULAR; INTRAVENOUS at 15:53

## 2022-06-20 ASSESSMENT — PATIENT HEALTH QUESTIONNAIRE - PHQ9
2. FEELING DOWN, DEPRESSED, IRRITABLE, OR HOPELESS: NOT AT ALL
SUM OF ALL RESPONSES TO PHQ9 QUESTIONS 1 AND 2: 0
1. LITTLE INTEREST OR PLEASURE IN DOING THINGS: NOT AT ALL

## 2022-06-20 ASSESSMENT — LIFESTYLE VARIABLES
TOTAL SCORE: 0
EVER FELT BAD OR GUILTY ABOUT YOUR DRINKING: NO
CONSUMPTION TOTAL: NEGATIVE
AVERAGE NUMBER OF DAYS PER WEEK YOU HAVE A DRINK CONTAINING ALCOHOL: 0
EVER HAD A DRINK FIRST THING IN THE MORNING TO STEADY YOUR NERVES TO GET RID OF A HANGOVER: NO
HOW MANY TIMES IN THE PAST YEAR HAVE YOU HAD 5 OR MORE DRINKS IN A DAY: 0
HAVE PEOPLE ANNOYED YOU BY CRITICIZING YOUR DRINKING: NO
ON A TYPICAL DAY WHEN YOU DRINK ALCOHOL HOW MANY DRINKS DO YOU HAVE: 0
HAVE YOU EVER FELT YOU SHOULD CUT DOWN ON YOUR DRINKING: NO
TOTAL SCORE: 0
ALCOHOL_USE: NO
TOTAL SCORE: 0

## 2022-06-20 ASSESSMENT — FIBROSIS 4 INDEX
FIB4 SCORE: 2.39
FIB4 SCORE: 3.15

## 2022-06-20 ASSESSMENT — PAIN DESCRIPTION - PAIN TYPE
TYPE: ACUTE PAIN

## 2022-06-20 NOTE — PROGRESS NOTES
"Pharmacy Chemotherapy Verification Note:    Dx: HR B-ALL        Protocol and Dosing Reference: Induction IA part 2([id number])      Allergies:  Amoxicillin     /68   Pulse (!) 112   Temp 36.8 °C (98.2 °F) (Temporal)   Resp 20   Ht 1.64 m (5' 4.57\")   Wt 63.8 kg (140 lb 10.5 oz)   SpO2 100%   BMI 23.72 kg/m²  Body surface area is 1.7 meters squared.    MD to review any labs. No hold parameters for this treatment day  5/30/22 ECHO LVEF 75%     Drug Order   (Drug name, dose, route, IV Fluid & volume, frequency, number of doses) Cycle: induction Day 22  Previous treatment: Induction D15 6/13/22     Medication = methotrexate  Base Dose = 12 mg fixed dose for age  Calc Dose: no calc req'd  Final Dose = 12 mg  Route = intraTHECAL  Fluid & Volume = pfNS 6 mL  Admin Duration = IT inj by MD only   To be given by MD      <10% difference, okay to treat with final dose      Medication = vincristine  Base Dose = 1.5 mg/m2 (MAX 2 mg)  Calc Dose: Base Dose x 1.7 m2 = 2.55 mg  Final Dose = 2 mg  Route = IV  Fluid & Volume = NS 25 mL  Admin Duration = Over 5-10 mins          Ok to treat with max dose   Medication = daunorubicin  Base Dose = 25 mg/m2  Calc Dose: Base Dose x 1.7 m2 = 42.5 mg  Final Dose = 44 mg  Route = IV  Fluid & Volume = NS 25 mL  Admin Duration = Over 15 mins          <10% difference, okay to treat with final dose     By my signature below, I confirm this process was performed independently with the BSA and all final chemotherapy dosing calculations congruent. I have reviewed the above chemotherapy order and that my calculation of the final dose and BSA (when applicable) corroborate those calculations of the  pharmacist.     Judy Bryant, PharmD, BCOP        "

## 2022-06-20 NOTE — H&P
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 thrombus on presentation, PE              - Remains hypercoagulable, also s/p Day 6 PEG-Aspargase with decreased albumin              - Compliant with apixaban 5 mg PO BID              - Anticoagulation held last night for procedure this AM              - Will restart apixiban tomorrow if improved platelets              - Will continue apixaban at least through Induction     6) At Risk of Opportunistic Lung Infection:              - Continue Bactrim SS 2 tabs PO BID on Sat/Sun     7) Constipation:              - Intermittently constipated and intermittently with diarrhea              - Continue bowel regimen as appropriate with MiraLAX and senna-docusate     8) Sinus Tachycardia (IMPROVED):     9) Anxiety:              - Baseline anxiety with increase secondary to diagnosis              - Continue with bedside counseling              -Have discussed referral to counselor, per patient will hold for the moment     10 ) Hypoalbuminemic:              - Disease and therapy  related, albumin 3.0              - Due in part to PEG-Asp - can assume coagulation factors affected                 - Discussed diet and encouraged protein intake     11) Transaminitis (WORSENING):              - AST 36,               - Bilirubin 2.1              - Continue to monitor     12) Steroid Treatment:              - BP 83/44              - Glucose 128              - Will continue to monitor    13) Pre-renal Azotemia:   - BUN to 29 with creatinine elevated above baseline at 0.83   - Fluid bolus in clinic   - Blood transfusion to follow     14) Social:              - Social Work involved              - Referral to [facility]               - Continue support     15) Access:               - R PICC placed, C/D/I, BID flushes              - Will consider replacing with Port-a-cath after End of Induction IB bone marrow evaluations      15) Research Participant:           [facility] - Source Data        Diagnosis: Precursor B-Cell Acute Lymphoblastic Leukemia     Molecular Diagnosis: BCR-ABL1      Disease Status: ACTIVE, CNS3c, testicular negative, HSV1 IgG POSITIVE, CMV IgG NEGATIVE, VARICELLA IgG POSITIVE     Active Studies: [id number], [id number]                                                                                                    Inactive Studies: [id number]     Optional Studies: None         Protocol: International Phase 3 trial in [place] 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      Treatment Plan:   [id number](OS), Induction 1A Part 2, Day 22     Treatment Ht/Wt/BSA 6/13/2022     Height: 1.65 m         Weight: 67.9 kg          BSA: 1.76 m²                                                        Performance Status: Karnofsky 50, ECOG  3       Current Therapeutic Medications:  (verified 100% compliance)     Imatinib 600 mg PO daily (start 6/3/2022)   Prednisone 60 mg PO BID (start  5/30/2022)     Evaluations / Study Labs (obtained prior to start of Day 22 therapy 6/20/2022):     WBC 0.4, Hgb 7.1, platelets 8  ,  - no blasts noted on peripheral smear     Total bilirubin 2.1 , creatinine 0.83      Therapy Given (6/202022):    Vincristine 2 mg IV  Daunorubicin 44.8 mg IV     Continuation of Imatinib with change of dose/frequency for [id number] -->  Imatinib 600 mg (rounded from 598.4 mg = 340 mg/m2) PO daily (1.5 tablets)         Disposition: Following chemotherapy, admit for transfusional needs overnight.     Pepe Faye MD  Pediatric Hematology / Oncology  [facility]  Cell.  [phone]  [facility]. [phone]

## 2022-06-20 NOTE — LETTER
Physician Notification of Admission      To: Margarito Arvizu M.D.    [address] [facility] [address]  [place] [address]    From: Physician Outpatient    Re: Tomas Anabaptism SHEY Jean-Baptiste, 2000    Admitted on: 6/20/2022  5:27 PM    Admitting Diagnosis:    ALL    Dear Margarito Arvizu M.D.,      Our records indicate that we have admitted a patient to [facility] Pediatrics department who has listed you as their primary care provider, and we wanted to make sure you were aware of this admission. We strive to improve patient care by facilitating active communication with our medical colleagues from around the region.    To speak with a member of the patients care team, please contact the [facility] Pediatric department at [phone].   Thank you for allowing us to participate in the care of your patient.

## 2022-06-20 NOTE — LETTER
Physician Notification of Discharge    Patient name: Tomas Jean-Baptiste     : 2000     MRN: [id number]    Discharge Date/Time: No discharge date for patient encounter.    Discharge Disposition: Discharged to home/self care ()    Discharge DX: There are no discharge diagnoses documented for the most recent discharge.    Discharge Meds:      Medication List      START taking these medications      Instructions   OLANZapine 5 MG Tabs  Commonly known as: ZYPREXA   Take 1 Tablet by mouth every evening.  Dose: 5 mg        CONTINUE taking these medications      Instructions   ammonium lactate 12 % Lotn  Commonly known as: LAC-HYDRIN   Apply to peeling areas of feet twice daily as needed     ascorbic acid 500 MG Tabs  Commonly known as: ascorbic acid   Take 500 mg by mouth every day.  Dose: 500 mg     Eliquis 5mg Tabs  Generic drug: apixaban   Doctor's comments: Meds to beds, please.  Planning discharge on .  Take 2 Tablets by mouth 2 times a day. Starting , take 1 tablet 2 times a day thereafter. Indications: DVT/PE  Dose: 10 mg     famotidine 20 MG Tabs  Commonly known as: PEPCID   Doctor's comments: Meds to beds, please.  Planning discharge on .  Take 1 Tablet by mouth 2 times a day.  Dose: 20 mg     imatinib 400 MG tablet  Commonly known as: GLEEVEC   Doctor's comments: Referral to [facility].  Take 1.5 Tablets by mouth every day for 30 days.  Dose: 600 mg     normal saline flush 0.9 % Soln   Doctor's comments: Please dispense in 10 mL syringes (#50)  Infuse 10 mL into a venous catheter every 12 hours.  Dose: 10 mL     ondansetron 8 MG Tbdp  Commonly known as: Zofran ODT   Dissovle 1 Tablet by mouth every 8 hours as needed for Nausea.  Dose: 8 mg     PEG 3350 17 g Pack   Mix 1 Packet per package instructions and drink by mouth 1 time a day as needed (if sennosides and docusate ineffective after 24 hours).  Dose: 17  g     predniSONE 20 MG Tabs  Commonly known as: DELTASONE   Doctor's comments: Meds to beds, please.  Planning discharge on June 4.  Take 3 Tablets by mouth 2 times a day for 22 days.  Dose: 60 mg     Stool Softener/Laxative 8.6-50 MG Tabs  Generic drug: senna-docusate   Take 2 Tablets by mouth every day.  Dose: 2 Tablet     sulfamethoxazole-trimethoprim 400-80 MG Tabs  Commonly known as: BACTRIM   Doctor's comments: Meds to beds, please.  Planning discharge on June 4.  Take 2 tablets by mouth twice daily every Saturday and Sunday     therapeutic multivitamin-minerals Tabs   Take 1 Tab by mouth every day.  Dose: 1 Tablet        STOP taking these medications    scopolamine 1 mg/72hr Pt72  Commonly known as: TRANSDERM-SCOP          Attending Provider: NANDINI Juarez*    [facility] Pediatrics Department    PCP: Margarito Arvizu M.D.    To speak with a member of the patients care team, please contact the [facility] Pediatric department -at [phone].   Thank you for allowing us to participate in the care of your patient.

## 2022-06-20 NOTE — PROGRESS NOTES
PT to [facility] for Dr gong, LP and chemo, accompanied by mother.  Afebrile.  VSS. PICC accessed and labs drawn.  Dressing changed completed. Skin intact, no s/s of infection. PT tolerated well.     Dr. Faye at bedside to speak with pt. Visit completed.     Tatiana from Lab called with critical result of WBC 0.4, platelets 8, ANC preliminary 0.19 at 1301. Critical lab result read back to Tatiana.   Dr. Faye notified of critical lab result at 1301.  Critical lab result read back by Dr. Faye. New orders received.       Platelets: Donor # [id number] W started at 1406.    Report given to Mary Saravia RN.

## 2022-06-21 ENCOUNTER — APPOINTMENT (OUTPATIENT)
Dept: INFUSION CENTER | Facility: MEDICAL CENTER | Age: 22
End: 2022-06-21
Attending: PEDIATRICS
Payer: COMMERCIAL

## 2022-06-21 VITALS
SYSTOLIC BLOOD PRESSURE: 100 MMHG | DIASTOLIC BLOOD PRESSURE: 45 MMHG | OXYGEN SATURATION: 98 % | RESPIRATION RATE: 16 BRPM | HEART RATE: 71 BPM | HEIGHT: 65 IN | WEIGHT: 143.3 LBS | BODY MASS INDEX: 23.88 KG/M2 | TEMPERATURE: 97.8 F

## 2022-06-21 DIAGNOSIS — R11.2 CINV (CHEMOTHERAPY-INDUCED NAUSEA AND VOMITING): ICD-10-CM

## 2022-06-21 DIAGNOSIS — T45.1X5A CINV (CHEMOTHERAPY-INDUCED NAUSEA AND VOMITING): ICD-10-CM

## 2022-06-21 LAB
ANISOCYTOSIS BLD QL SMEAR: ABNORMAL
BARCODED ABORH UBTYP: 5100
BARCODED ABORH UBTYP: 6200
BARCODED PRD CODE UBPRD: NORMAL
BARCODED PRD CODE UBPRD: NORMAL
BARCODED UNIT NUM UBUNT: NORMAL
BARCODED UNIT NUM UBUNT: NORMAL
BASOPHILS # BLD AUTO: 0 % (ref 0–1.8)
BASOPHILS # BLD: 0 K/UL (ref 0–0.12)
BURR CELLS/RBC NFR CSF MANUAL: 0 %
CLARITY CSF: NORMAL
COLOR CSF: NORMAL
COLOR SPUN CSF: COLORLESS
COMPONENT P 8504P: NORMAL
COMPONENT P 8504P: NORMAL
CYTOLOGY REG CYTOL: NORMAL
EOSINOPHIL # BLD AUTO: 0 K/UL (ref 0–0.51)
EOSINOPHIL NFR BLD: 0 % (ref 0–6.9)
ERYTHROCYTE [DISTWIDTH] IN BLOOD BY AUTOMATED COUNT: 46.5 FL (ref 35.9–50)
HCT VFR BLD AUTO: 22.1 % (ref 42–52)
HGB BLD-MCNC: 7.4 G/DL (ref 14–18)
HYPOCHROMIA BLD QL SMEAR: ABNORMAL
LYMPHOCYTES # BLD AUTO: 0.09 K/UL (ref 1–4.8)
LYMPHOCYTES NFR BLD: 43.6 % (ref 22–41)
LYMPHOCYTES NFR CSF: 61 %
MACROCYTES BLD QL SMEAR: ABNORMAL
MANUAL DIFF BLD: NORMAL
MCH RBC QN AUTO: 30.7 PG (ref 27–33)
MCHC RBC AUTO-ENTMCNC: 33.5 G/DL (ref 33.7–35.3)
MCV RBC AUTO: 91.7 FL (ref 81.4–97.8)
METAMYELOCYTES NFR BLD MANUAL: 0.8 %
MICROCYTES BLD QL SMEAR: ABNORMAL
MONOCYTES # BLD AUTO: 0 K/UL (ref 0–0.85)
MONOCYTES NFR BLD AUTO: 2.4 % (ref 0–13.4)
MONONUC CELLS NFR CSF: 8 %
MORPHOLOGY BLD-IMP: NORMAL
MYELOCYTES NFR BLD MANUAL: 0.8 %
NEUTROPHILS # BLD AUTO: 0.1 K/UL (ref 1.82–7.42)
NEUTROPHILS NFR BLD: 52.4 % (ref 44–72)
NEUTROPHILS NFR CSF: 31 %
NRBC # BLD AUTO: 0.04 K/UL
NRBC # CSF: 14 10*3/UL
NRBC BLD-RTO: 22.2 /100 WBC
PLATELET # BLD AUTO: 24 K/UL (ref 164–446)
PLATELET BLD QL SMEAR: NORMAL
PMV BLD AUTO: 10.4 FL (ref 9–12.9)
PRODUCT TYPE UPROD: NORMAL
PRODUCT TYPE UPROD: NORMAL
RBC # BLD AUTO: 2.41 M/UL (ref 4.7–6.1)
RBC # CSF: NORMAL CELLS/UL
RBC BLD AUTO: PRESENT
SPECIMEN VOL CSF: 1.5 ML
TUBE # CSF: 2
TUBE # CSF: 2
UNIT STATUS USTAT: NORMAL
UNIT STATUS USTAT: NORMAL
WBC # BLD AUTO: 0.2 K/UL (ref 4.8–10.8)
WBC # CSF: 2 CELLS/UL (ref 0–10)

## 2022-06-21 PROCEDURE — 700102 HCHG RX REV CODE 250 W/ 637 OVERRIDE(OP): Performed by: PEDIATRICS

## 2022-06-21 PROCEDURE — 89051 BODY FLUID CELL COUNT: CPT

## 2022-06-21 PROCEDURE — 3E0R305 INTRODUCTION OF OTHER ANTINEOPLASTIC INTO SPINAL CANAL, PERCUTANEOUS APPROACH: ICD-10-PCS | Performed by: PEDIATRICS

## 2022-06-21 PROCEDURE — 96450 CHEMOTHERAPY INTO CNS: CPT | Performed by: PEDIATRICS

## 2022-06-21 PROCEDURE — 86923 COMPATIBILITY TEST ELECTRIC: CPT

## 2022-06-21 PROCEDURE — 36430 TRANSFUSION BLD/BLD COMPNT: CPT

## 2022-06-21 PROCEDURE — 86945 BLOOD PRODUCT/IRRADIATION: CPT | Mod: 91

## 2022-06-21 PROCEDURE — 88108 CYTOPATH CONCENTRATE TECH: CPT

## 2022-06-21 PROCEDURE — 700105 HCHG RX REV CODE 258: Performed by: PEDIATRICS

## 2022-06-21 PROCEDURE — P9034 PLATELETS, PHERESIS: HCPCS

## 2022-06-21 PROCEDURE — 009U3ZZ DRAINAGE OF SPINAL CANAL, PERCUTANEOUS APPROACH: ICD-10-PCS | Performed by: PEDIATRICS

## 2022-06-21 PROCEDURE — 85025 COMPLETE CBC W/AUTO DIFF WBC: CPT

## 2022-06-21 PROCEDURE — 85007 BL SMEAR W/DIFF WBC COUNT: CPT

## 2022-06-21 PROCEDURE — A9270 NON-COVERED ITEM OR SERVICE: HCPCS | Performed by: PEDIATRICS

## 2022-06-21 PROCEDURE — 700111 HCHG RX REV CODE 636 W/ 250 OVERRIDE (IP): Performed by: PEDIATRICS

## 2022-06-21 PROCEDURE — P9016 RBC LEUKOCYTES REDUCED: HCPCS

## 2022-06-21 PROCEDURE — 700101 HCHG RX REV CODE 250: Performed by: PEDIATRICS

## 2022-06-21 PROCEDURE — 99238 HOSP IP/OBS DSCHRG MGMT 30/<: CPT | Mod: 25 | Performed by: PEDIATRICS

## 2022-06-21 RX ORDER — MIDAZOLAM HYDROCHLORIDE 1 MG/ML
2 INJECTION INTRAMUSCULAR; INTRAVENOUS ONCE
Status: COMPLETED | OUTPATIENT
Start: 2022-06-21 | End: 2022-06-21

## 2022-06-21 RX ORDER — OLANZAPINE 5 MG/1
5 TABLET ORAL EVERY EVENING
Qty: 30 TABLET | Refills: 0 | Status: SHIPPED | OUTPATIENT
Start: 2022-06-21 | End: 2022-06-21 | Stop reason: SDUPTHER

## 2022-06-21 RX ORDER — OLANZAPINE 5 MG/1
5 TABLET ORAL EVERY EVENING
Qty: 30 TABLET | Refills: 0 | Status: SHIPPED | OUTPATIENT
Start: 2022-06-21 | End: 2022-07-14

## 2022-06-21 RX ORDER — LIDOCAINE AND PRILOCAINE 25; 25 MG/G; MG/G
CREAM TOPICAL PRN
Status: DISCONTINUED | OUTPATIENT
Start: 2022-06-21 | End: 2022-06-21 | Stop reason: HOSPADM

## 2022-06-21 RX ADMIN — LIDOCAINE AND PRILOCAINE 1 APPLICATION: 25; 25 CREAM TOPICAL at 10:12

## 2022-06-21 RX ADMIN — IMATINIB MESYLATE 600 MG: 400 TABLET, FILM COATED ORAL at 05:58

## 2022-06-21 RX ADMIN — MIDAZOLAM HYDROCHLORIDE 2 MG: 1 INJECTION, SOLUTION INTRAMUSCULAR; INTRAVENOUS at 11:59

## 2022-06-21 RX ADMIN — SODIUM CHLORIDE 44 MG: 9 INJECTION, SOLUTION INTRAVENOUS at 04:23

## 2022-06-21 RX ADMIN — PREDNISONE 60 MG: 20 TABLET ORAL at 05:59

## 2022-06-21 RX ADMIN — FAMOTIDINE 20 MG: 20 TABLET, FILM COATED ORAL at 06:00

## 2022-06-21 RX ADMIN — METHOTREXATE 12 MG: 25 INJECTION INTRA-ARTERIAL; INTRAMUSCULAR; INTRATHECAL; INTRAVENOUS at 12:16

## 2022-06-21 ASSESSMENT — FIBROSIS 4 INDEX: FIB4 SCORE: 3.15

## 2022-06-21 ASSESSMENT — PAIN DESCRIPTION - PAIN TYPE
TYPE: ACUTE PAIN
TYPE: ACUTE PAIN

## 2022-06-21 NOTE — PROGRESS NOTES
Pt demonstrates ability to turn self in bed without assistance of staff. Patient and family understands importance in prevention of skin breakdown, ulcers, and potential infection. Hourly rounding in effect. RN skin check complete.   Devices in place include: PICC, pulse ox, and BP cuff.  Skin assessed under devices: Yes.  Confirmed HAPI identified on the following date: NA   Location of HAPI: NA.  Wound Care RN following: No.  The following interventions are in place: Dressing changes as needed and pillows in place for support/positioning.

## 2022-06-21 NOTE — PROGRESS NOTES
Assumed care of patient. Pt in bed resting comfortably with no s/sx of pain or discomfort.  Call light within reach, hourly rounding in place. No needs at this time. See flowsheets for further assessment details.

## 2022-06-21 NOTE — PROGRESS NOTES
"Pediatric Hematology/Oncology  Daily Progress Note      Patient Name:  Tomas Jean-Baptiste  : 2000  MRN: [id number]    Location of Service:  [facility]  Date of Service: 2022  Time: 11:43 AM    Hospital Day: 2    Protocol / Treatment Plan:  [place] Chromosome Precursor B-Cell Acute Lymphoblastic Leukemia, ON STUDY [id number], Induction IA Part 2, Day 23    SUBJECTIVE:     No acute events overnight.  Transferred to floor for continued observation as well as transfusional needs.  Received platelets as well as 2 units of PRBC overnight.  This morning, Tomas Roy reports that he is feeling better.  He still feels quite weak and fatigued however his overall energy has improved after receiving PRBCs.  No complaints of any headaches, changes in vision or neurologic status changes.  Does have some mild discomfort in his back however this has improved considerably as well.  No complaints of any neurologic changes in his feet, ankles or legs.  Afebrile.  No signs or symptoms of acute illness.  Blood pressures were reported as \"soft\" overnight however he did not have much deviation and they remained stable.  No complaints of any significant belly pain.  Did have some mild nausea and a couple of episodes of emesis.  No other concerns or complaints at this time.    Review of Systems:     Constitutional: Afebrile, feeling much better than yesterday.  Improved appetite but still has nausea and occasional vomiting..  HENT: Negative.  Eyes: Negative for visual changes.  Respiratory: Negative for shortness of breath.  Cardiovascular: Negative.  Gastrointestinal: Mild to moderate nausea with occasional vomiting.  Still with intermittent diarrhea and constipation.  Genitourinary: Negative.  Musculoskeletal: Negative for arm pain or leg pain.  Mild back pain.  Skin: Negative for rash or skin infection.  Neurological: Negative for numbness, tingling, sensory changes, weakness or " "headaches.    Endo/Heme/Allergies: No bruising/bleeding easily.    Psychiatric/Behavioral: Stable mood.     OBJECTIVE:     Max Temp: Temp (24hrs), Av.6 °C (97.8 °F), Min:36.3 °C (97.3 °F), Max:37.3 °C (99.2 °F)    Vitals: /45   Pulse 71   Temp 36.6 °C (97.8 °F) (Temporal)   Resp 16   Ht 1.66 m (5' 5.35\")   Wt 65 kg (143 lb 4.8 oz)   SpO2 98%   BMI 23.59 kg/m²     I/O:   Intake/Output Summary (Last 24 hours) at 2022 1143  Last data filed at 2022 1103  Gross per 24 hour   Intake 1963.66 ml   Output 75 ml   Net 1888.66 ml     Labs:    Most Recent Hematology Labs:    Lab Results   Component Value Date/Time    WBC 0.2 (LL) 2022 0604    HEMOGLOBIN 7.4 (L) 2022 0604    MCV 91.7 2022 0604    PLATELETCT 24 (L) 2022 0604    NEUTS 0.10 (LL) 2022 0604       Most Recent Metabolic Panel:    Lab Results   Component Value Date/Time    POTASSIUM 4.2 2022 1218    CHLORIDE 103 2022 1218    CO2 20 2022 1218    GLUCOSE 128 (H) 2022 1218    BUN 29 (H) 2022 1218    CREATININE 0.83 2022 1218    CALCIUM 7.8 (L) 2022 1218    ANION 11.0 2022 1218     Physical Exam:    Constitutional: Much improved, well appearing.  Still tired but overall dramatic improvement overnight.  HENT: Normocephalic and atraumatic.  Shaved head no rhinorrhea. Oropharynx is clear and moist.   Eyes: Conjunctivae are normal. EOMI. Slight squint of right eye.   Neck: Normal range of motion of neck, no adenopathy.    Cardiovascular: Normal rate, regular rhythm.  No murmur. DP/radial pulses 2+, cap refill < 2 sec.  Pulmonary/Chest: Effort normal. No respiratory distress. Symmetric expansion.  No crackles or wheezes.  Abdomen: Soft. Bowel sounds are normal. No distension and no mass. There is no hepatosplenomegaly.    Genitourinary:  Deferred.  Musculoskeletal: Normal range of motion of lower and upper extremities bilaterally.   Lymphadenopathy: No cervical adenopathy, " axillary adenopathy or inguinal adenopathy.   Neurological: Alert and oriented to person and place. Exhibits normal muscle tone bilaterally in upper and lower extremities.   Skin: Skin is warm, dry and pink.  No rash or evidence of skin infection.    Psychiatric: Mood improved from yesterday.    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              - Testicular status NEGATIVE                   - Flow cytometry of both 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, FISH positive for BCR-ABL1 translocation              - [place] [facility] [id number]              - [place] [facility] [place] [id number] - but taken off study due to Ph+ ALL status                            - [place] [facility] [place] [id number] and started with Day 15 (6/13/2022)              - Started imatinib therapy 6/3/2022 (split dosing of imatinib 400 mg PO QAM and 200 mg PO QPM) - continued at Day 15 with imatinib 600 mg PO daily (100% compliant)                                                  Ph+ Acute B-Lymphoblastic Leukemia, ON STUDY [id number], Induction 1A, Part 2, Day 23:                          == Has completed Days 1-15 with a standard 4-Drug Induction with the addition of imatinib starting at Day 5 (IT ARAC, VCR, DAUNORUBICIN, PEG-ASP. PREDNISONE, IT MTX for CNS3c, IMATINIB PO) ==                          ** Vincristine 2 mg IV on Day 15 and 22 (COMPLETE)                          **  Daunorubicin 44 mg (25 mg/m2) IV on Day 15 and 22 (COMPLETE)                          ** Prednisone 60 mg PO BID on Days 1-28 (100% compliant)                          ** Methotrexate IT on Days 15, 22 (unsuccessful) performed successfully on Day 23 (See Separate Procedure Note) and 29 (CNS3c)                          ** Imatinib 600 mg PO daily (started 6/3/2022 - adjusted frequency to meet with [id number]) (100% compliant)                 -We will discharge home this afternoon     2) Disease and Chemotherapy Related Pancytopenia:              - WBC 0.2, Hgb 7.4, platelets 24              - ANC 100, ALC 90- no blasts noted on peripheral smear              - Transfused platelets again today prior to lumbar puncture              - Continue thrombocytopenia precautions              - Transfuse platelets for platelets < 10K or active bleeding              - Transfuse for Hgb < 7 or symptomatic              - Neutropenic Precautions     3) Chemotherapy Induced Nausea and Vomiting:              - Mild to moderate nausea at home              - Zofran, Ativan PRN at home - scheduled prior to chemotherapy today             - Some improvement with scopolamine but insurance denying             -Continue olanzapine 5 mg PO daily     4) Sixth Cranial Nerve Palsy (STABLE):              - Seen by Dr. Carranza (Ped Neuroophthalmology)              - Continue patching              - Some continued improvement     5) Hypercoagulability / Superficial Right LE Thrombus / Subsegmental PE:              - Superficial thrombus on presentation, PE              - Remains hypercoagulable, also s/p Day 6 PEG-Aspargase with decreased albumin              - Compliant with apixaban 5 mg PO BID              - Anticoagulation held last night for procedure this AM              - Will restart apixiban this evening              - Will continue apixaban at least through Induction     6) At Risk of Opportunistic Lung Infection:              - Continue  Bactrim SS 2 tabs PO BID on Sat/Sun     7) Constipation:              - Intermittently constipated and intermittently with diarrhea              - Continue bowel regimen as appropriate with MiraLAX and senna-docusate     8) Anxiety:              - Baseline anxiety with increase secondary to diagnosis              - Continue with bedside counseling              - Have discussed referral to counselor, per patient will hold for the moment     9) Hypoalbuminemic:              - Disease and therapy related, albumin 3.0              - Due in part to PEG-Asp - can assume coagulation factors affected                 - Discussed diet and encouraged protein intake     10) Transaminitis (WORSENING):              - AST 36,  yesterday              - Bilirubin 2.1 yesterday              - Continue to monitor     11) Steroid Treatment:              - BP 100/45              - Glucose               - Will continue to monitor     12) Social:              - Social Work involved              - Referral to [facility]               - Continue support     13 Access:               - R PICC placed, C/D/I, BID flushes              - Will consider replacing with Port-a-cath after End of Induction IB bone marrow evaluations      15) Research Participant:          [facility] - Source Data       Diagnosis: Precursor B-Cell Acute Lymphoblastic Leukemia     Molecular Diagnosis: BCR-ABL1      Disease Status: ACTIVE, CNS3c, testicular negative, HSV1 IgG POSITIVE, CMV IgG NEGATIVE, VARICELLA IgG POSITIVE     Active Studies: [id number], [id number]                                                                                                    Inactive Studies: [id number]     Optional Studies: None         Protocol: International Phase 3 trial in [place] 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      Treatment  Plan:   [id number](OS), Induction 1A Part 2, Day 22     Treatment Ht/Wt/BSA 6/13/2022     Height: 1.65 m         Weight: 67.9 kg          BSA: 1.76 m²                                                        Performance Status: Karnofsky 50, ECOG  3       Current Therapeutic Medications:  (verified 100% compliance)     Imatinib 600 mg PO daily (start 6/3/2022)   Prednisone 60 mg PO BID (start 5/30/2022)     Evaluations / Study Labs (obtained prior to start of Day 22 therapy 6/20/2022):     WBC 0.4, Hgb 7.1, platelets 8  ANC 220, ALC 180 - no blasts noted on peripheral smear     Total bilirubin 2.1 , creatinine 0.83      Therapy Given (6/21/2022):     Methotrexate 12 mg IT x 1 dose     Continuation of Imatinib with change of dose/frequency for [id number] -->  Imatinib 600 mg (rounded from 598.4 mg = 340 mg/m2) PO daily (1.5 tablets)       Disposition: Discharge home, return to clinic 6/27/2022 for End of Induction IA Part 2 evaluations and therapy    Pepe Faye MD  Pediatric Hematology / Oncology  [facility]  Cell.  [phone]  Office. [phone]

## 2022-06-21 NOTE — PROCEDURES
Pediatric Oncology Lumbar Puncture  Procedure Note      Patient Name:  Tomas Jean-Baptiste  : 2000   MRN: [id number]    Service Location:  [facility]  Date of Service: 2022  Time: 12:27 PM    Procedure Performed By: Pepe Faye M.D.    Pre-procedural Diagnosis:  [place] chromosome positive acute lymphoblastic leukemia not yet having achieved remission    Post-procedural Diagnosis: [place] chromosome positive acute lymphoblastic leukemia not yet having achieved remission    Procedure:  Lumbar Puncture with administration of intrathecal chemotherapy    Analgesia: Emla cream    Anxiolysis: Versed 2 mg IV x1    Intrathecal Chemotherapy:  Yes    Chemotherapy Administered:  Methotrexate 12 mg IT (in 6 mL NS)    Needle Size:  22 gauge, 3.5 in  Site: L3-L4  Number of Attempts: 1  Fluid:  3 ml xanthochromic fluid obtained  Labs: Cell count, cytology    Complications:  None    Procedure Note:    Tomas Jean-Baptiste is a 21 y.o. male diagnosed with [place] chromosome positive acute lymphoblastic leukemia not yet having achieved remission.  Remains hospitalized after transfusional needs today.  Yesterday, lumbar puncture was attempted but unsuccessful.  Today, a second attempt was made.  Prior to the procedure, the risks and benefits were discussed with Tomas.  Consent for the procedure was signed by Tomas and placed in his chart.  All pertinent labs and history were reviewed and a complete History and Physical Examination were performed and placed in the medical record.  Prior to procedure, an additional unit of platelets was transfused.  Tomas Roy was then transferred to a gurney in his room where the procedure was performed..  All necessary safety equipment per ASA guidelines were available.  A time-out was performed and the patient identified by name,  and medical record number.  Gloves and mask were worn during the entire  procedure.  Tomas Roy was prepped and draped in the usual sterile fashion with povoiodine.  He was positioned in the left decubitus position and all landmarks including superior posterior iliac crest, umbilicus and vertebral bodies were identified by palpation.  A 3.5 in, 22 gauge spinal needle was introduced into the L3-L4 spinal interspace.  6 ml of xanthochromic fluid were obtained and sent for cell count and cytology.  Methotrexate 12 mg in 6 mL of NS was verified with the nurse bedside and then then administered into the spinal fluid. Tomas Roy tolerated the procedure without complication or bleeding.  He was instructed to lie flat for 1 hour following the procedure.    Results:    PENDING    Pepe Faye MD  Pediatric Hematology / Oncology  [facility]  Cell.  [phone]

## 2022-06-21 NOTE — DISCHARGE PLANNING
Chart reviewed by this RN CM.  Patient lives with family in [place].  His PCP is Dr. Margarito Arvizu and he is followed by outpatient oncology.  Patient has [facility] HPN and is pending [place] Medicaid.  No CM needs noted at this time.  HCM to remain available to assist with any discharge needs.      Anticipate home when medically cleared.

## 2022-06-21 NOTE — ADDENDUM NOTE
Encounter addended by: Dayna Damian R.N. on: 6/21/2022 9:14 AM   Actions taken: Charge Capture section accepted

## 2022-06-21 NOTE — PROGRESS NOTES
Total volume infused:242    Vital signs monitored per protocol.  Transfusion completed at 1523 and PT tolerated well.  Pt to procedure room for LP. LP unsuccessful.     Chemotherapy dosage calculated independently by MONTSERRAT Cho and MONTSERRAT Borja and compared to road map for protocol [id number].  Calculations within 10% of written order.  Lab results reviewed.      Premedications and Vincristine given as ordered, see MAR.  Blood return verified prior to, during, and after chemotherapy infusion.  See Chemotherapy flowsheet. Pt to be admitted to Peds floor for additional platelets, blood transfusion and chemotherapy. Report to MONTSERRAT Stewart. Pt transported to Peds 418. RN to bedside.

## 2022-06-21 NOTE — PROGRESS NOTES
ADELA from Lab called with critical result of WBC: 0.2 at 0641. Critical lab result read back to ADELA.   Dr. Rodriguez notified of critical lab result at 0643.  Critical lab result read back by Dr. Rodriguez.

## 2022-06-21 NOTE — PROGRESS NOTES
Chemotherapy dosage calculated independently by MONTSERRAT Padilla and MONTSERRAT Sweeney and compared to road map for protocol Peds Induction 1A Part 2 - [id number].  Calculations within 10% of written order.  Lab results reviewed.      Chemo given as ordered, see MAR.  Blood return verified prior to, during, and after chemotherapy infusion.  See Chemotherapy flowsheet.  PT tolerated well.  No side effects or complications noted.

## 2022-06-21 NOTE — PROGRESS NOTES
Tatiana from Lab called with critical result of WBC 0.3, Hemoglobin 5.4, Hematocrit 16.6 and platelets of 18 at 1534. Critical lab result read back to Tatiana.   Dr. Faye notified of critical lab result at 1549.  Critical lab result read back by Dr. Faye.

## 2022-06-21 NOTE — H&P
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 Subsegmental PE:              - Superficial thrombus on presentation, PE              - Remains hypercoagulable, also s/p Day 6 PEG-Aspargase with decreased albumin              - Compliant with apixaban 5 mg PO BID              - Anticoagulation held last night for procedure this AM              - Will restart apixiban tomorrow if improved platelets              - Will continue apixaban at least through Induction     6) At Risk of Opportunistic Lung Infection:              - Continue Bactrim SS 2 tabs PO BID on Sat/Sun     7) Constipation:              - Intermittently constipated and intermittently with diarrhea              - Continue bowel regimen as appropriate with MiraLAX and senna-docusate     8) Sinus Tachycardia (IMPROVED):     9) Anxiety:              - Baseline anxiety with increase secondary to diagnosis              - Continue with bedside counseling              -Have discussed referral to counselor, per patient will hold for  the moment     10 ) Hypoalbuminemic:              - Disease and therapy related, albumin 3.0              - Due in part to PEG-Asp - can assume coagulation factors affected                 - Discussed diet and encouraged protein intake     11) Transaminitis (WORSENING):              - AST 36,               - Bilirubin 2.1              - Continue to monitor     12) Steroid Treatment:              - BP 83/44              - Glucose 128              - Will continue to monitor     13) Pre-renal Azotemia:             - BUN to 29 with creatinine elevated above baseline at 0.83             - Fluid bolus in clinic             - Blood transfusion to follow     14) Social:              - Social Work involved              - Referral to [facility]               - Continue support     15) Access:               - R PICC placed, C/D/I, BID flushes              - Will consider replacing with Port-a-cath after End of Induction IB bone marrow evaluations      15) Research Participant:              [facility] - Source Data         Diagnosis: Precursor B-Cell Acute Lymphoblastic Leukemia     Molecular Diagnosis: BCR-ABL1      Disease Status: ACTIVE, CNS3c, testicular negative, HSV1 IgG POSITIVE, CMV IgG NEGATIVE, VARICELLA IgG POSITIVE     Active Studies: [id number], [id number]                                                                                                    Inactive Studies: [id number]     Optional Studies: None         Protocol: International Phase 3 trial in [place] 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      Treatment Plan:   [id number](OS), Induction 1A Part 2, Day 22     Treatment Ht/Wt/BSA 6/13/2022     Height: 1.65 m         Weight: 67.9 kg          BSA: 1.76 m²                                                        Performance Status: Karnofsky 50, ECOG  3       Current  Therapeutic Medications:  (verified 100% compliance)     Imatinib 600 mg PO daily (start 6/3/2022)   Prednisone 60 mg PO BID (start 5/30/2022)     Evaluations / Study Labs (obtained prior to start of Day 22 therapy 6/20/2022):     WBC 0.4, Hgb 7.1, platelets 8  ANC 220, ALC 180 - no blasts noted on peripheral smear     Total bilirubin 2.1 , creatinine 0.83      Therapy Given (6/202022):     Vincristine 2 mg IV  Daunorubicin 44.8 mg IV     Continuation of Imatinib with change of dose/frequency for [id number] -->  Imatinib 600 mg (rounded from 598.4 mg = 340 mg/m2) PO daily (1.5 tablets)            Disposition: Following chemotherapy, admit for transfusional needs overnight.     Pepe Faye MD  Pediatric Hematology / Oncology  [facility]  Cell.  [phone]  Office. [phone]

## 2022-06-21 NOTE — PROGRESS NOTES
Patient discharged home with mom. Per Dr. Faye ok to resume home medications. All questions answered.

## 2022-06-21 NOTE — DISCHARGE INSTRUCTIONS
PATIENT INSTRUCTIONS:      Given by:   Physician and Nurse    Instructed in:  If yes, include date/comment and person who did the instructions       A.D.L:       Yes                Activity:      Yes       As tolerated    Diet::          Yes       As tolerated    Medication:  Yes    See medication detail    Equipment:  NA    Treatment:  Yes  Return to emergency room or call Dr. Faye for new or worsening symptoms. Monitor for bleeding and fevers.     Other:          NA    Education Class:  NA    Patient/Family verbalized/demonstrated understanding of above Instructions:  yes  __________________________________________________________________________    OBJECTIVE CHECKLIST  Patient/Family has:    All medications brought from home   NA  Valuables from safe                            NA  Prescriptions                                       Yes  All personal belongings                       Yes  Equipment (oxygen, apnea monitor, wheelchair)     NA  Other: NA    Discharge Survey Information  You may be receiving a survey from [facility].  Our goal is to provide the best patient care in the nation.  With your input, we can achieve this goal.    Which Discharge Education Sheets Provided:   Apixaban oral tablets  What is this medicine?  APIXABAN (a PIX a ban) is an anticoagulant (blood thinner). It is used to lower the chance of stroke in people with a medical condition called atrial fibrillation. It is also used to treat or prevent blood clots in the lungs or in the veins.  This medicine may be used for other purposes; ask your health care provider or pharmacist if you have questions.  COMMON BRAND NAME(S): Eliquis  What should I tell my health care provider before I take this medicine?  They need to know if you have any of these conditions:  antiphospholipid antibody syndrome  bleeding disorders  bleeding in the brain  blood in your stools (black or tarry stools) or if you have blood in your  vomit  history of blood clots  history of stomach bleeding  kidney disease  liver disease  mechanical heart valve  an unusual or allergic reaction to apixaban, other medicines, foods, dyes, or preservatives  pregnant or trying to get pregnant  breast-feeding  How should I use this medicine?  Take this medicine by mouth with a glass of water. Follow the directions on the prescription label. You can take it with or without food. If it upsets your stomach, take it with food. Take your medicine at regular intervals. Do not take it more often than directed. Do not stop taking except on your doctor's advice. Stopping this medicine may increase your risk of a blood clot. Be sure to refill your prescription before you run out of medicine.  Talk to your pediatrician regarding the use of this medicine in children. Special care may be needed.  Overdosage: If you think you have taken too much of this medicine contact a poison control center or emergency room at once.  NOTE: This medicine is only for you. Do not share this medicine with others.  What if I miss a dose?  If you miss a dose, take it as soon as you can. If it is almost time for your next dose, take only that dose. Do not take double or extra doses.  What may interact with this medicine?  This medicine may interact with the following:  aspirin and aspirin-like medicines  certain medicines for fungal infections like ketoconazole and itraconazole  certain medicines for seizures like carbamazepine and phenytoin  certain medicines that treat or prevent blood clots like warfarin, enoxaparin, and dalteparin  clarithromycin  NSAIDs, medicines for pain and inflammation, like ibuprofen or naproxen  rifampin  ritonavir  Alexis's wort  This list may not describe all possible interactions. Give your health care provider a list of all the medicines, herbs, non-prescription drugs, or dietary supplements you use. Also tell them if you smoke, drink alcohol, or use illegal drugs.  Some items may interact with your medicine.  What should I watch for while using this medicine?  Visit your healthcare professional for regular checks on your progress. You may need blood work done while you are taking this medicine. Your condition will be monitored carefully while you are receiving this medicine. It is important not to miss any appointments.  Avoid sports and activities that might cause injury while you are using this medicine. Severe falls or injuries can cause unseen bleeding. Be careful when using sharp tools or knives. Consider using an electric razor. Take special care brushing or flossing your teeth. Report any injuries, bruising, or red spots on the skin to your healthcare professional.  If you are going to need surgery or other procedure, tell your healthcare professional that you are taking this medicine.  Wear a medical ID bracelet or chain. Carry a card that describes your disease and details of your medicine and dosage times.  What side effects may I notice from receiving this medicine?  Side effects that you should report to your doctor or health care professional as soon as possible:  allergic reactions like skin rash, itching or hives, swelling of the face, lips, or tongue  signs and symptoms of bleeding such as bloody or black, tarry stools; red or dark-brown urine; spitting up blood or brown material that looks like coffee grounds; red spots on the skin; unusual bruising or bleeding from the eye, gums, or nose  signs and symptoms of a blood clot such as chest pain; shortness of breath; pain, swelling, or warmth in the leg  signs and symptoms of a stroke such as changes in vision; confusion; trouble speaking or understanding; severe headaches; sudden numbness or weakness of the face, arm or leg; trouble walking; dizziness; loss of coordination  This list may not describe all possible side effects. Call your doctor for medical advice about side effects. You may report side effects to  FDA at 3-783-WGV-9431.  Where should I keep my medicine?  Keep out of the reach of children.  Store at room temperature between 20 and 25 degrees C (68 and 77 degrees F). Throw away any unused medicine after the expiration date.  NOTE: This sheet is a summary. It may not cover all possible information. If you have questions about this medicine, talk to your doctor, pharmacist, or health care provider.  © 2020 Elsevier/Gold Standard (2019-08-28 17:39:34)    Lumbar Puncture, Care After  This sheet gives you information about how to care for yourself after your procedure. Your health care provider may also give you more specific instructions. If you have problems or questions, contact your health care provider.  What can I expect after the procedure?  After the procedure, it is common to have:  Mild discomfort or pain at the puncture site.  A mild headache that is relieved with pain medicines.  Follow these instructions at home:  Activity    Lie down flat or rest for as long as directed by your health care provider.  Return to your normal activities as told by your health care provider. Ask your health care provider what activities are safe for you.  Avoid lifting anything heavier than 10 lb (4.5 kg) for at least 12 hours after the procedure.  Do not drive for 24 hours if you were given a medicine to help you relax (sedative) during your procedure.  Do not drive or use heavy machinery while taking prescription pain medicine.  Puncture site care  Remove or change your bandage (dressing) as told by your health care provider.  Check your puncture area every day for signs of infection. Check for:  More pain.  Redness or swelling.  Fluid or blood leaking from the puncture site.  Warmth.  Pus or a bad smell.  General instructions  Take over-the-counter and prescription medicines only as told by your health care provider.  Drink enough fluids to keep your urine clear or pale yellow. Your health care provider may recommend  drinking caffeine to prevent a headache.  Keep all follow-up visits as told by your health care provider. This is important.  Contact a health care provider if:  You have fever or chills.  You have nausea or vomiting.  You have a headache that lasts for more than 2 days or does not get better with medicine.  Get help right away if:  You develop any of the following in your legs:  Weakness.  Numbness.  Tingling.  You are unable to control when you urinate or have a bowel movement (incontinence).  You have signs of infection around your puncture site, such as:  More pain.  Redness or swelling.  Fluid or blood leakage.  Warmth.  Pus or a bad smell.  You are dizzy or you feel like you might faint.  You have a severe headache, especially when you sit or stand.  Summary  A lumbar puncture is a procedure in which a small needle is inserted into the lower back to remove fluid that surrounds the brain and spinal cord.  After this procedure, it is common to have a headache and pain around the needle insertion area.  Lying flat, staying hydrated, and drinking caffeine can help prevent headaches.  Monitor your needle insertion site for signs of infection, including warmth, fluid, or more pain.  Get help right away if you develop leg weakness, leg numbness, incontinence, or severe headaches.  This information is not intended to replace advice given to you by your health care provider. Make sure you discuss any questions you have with your health care provider.  Document Released: 12/23/2014 Document Revised: 01/31/2018 Document Reviewed: 01/31/2018  Elsevier Patient Education © 2020 Elsevier Inc.        Rehabilitation Follow-up: NA    Special Needs on Discharge (Specify) NA      Type of Discharge: Order  Mode of Discharge:  wheelchair  Method of Transportation:Private Car  Destination:  home  Transfer:  Referral Form:   No  Agency/Organization:  Accompanied by:  Specify relationship under 18 years of age) Mother    Discharge  date:  6/21/2022    1:19 PM    Depression / Suicide Risk    As you are discharged from this [facility] facility, it is important to learn how to keep safe from harming yourself.    Recognize the warning signs:  Abrupt changes in personality, positive or negative- including increase in energy   Giving away possessions  Change in eating patterns- significant weight changes-  positive or negative  Change in sleeping patterns- unable to sleep or sleeping all the time   Unwillingness or inability to communicate  Depression  Unusual sadness, discouragement and loneliness  Talk of wanting to die  Neglect of personal appearance   Rebelliousness- reckless behavior  Withdrawal from people/activities they love  Confusion- inability to concentrate     If you or a loved one observes any of these behaviors or has concerns about self-harm, here's what you can do:  Talk about it- your feelings and reasons for harming yourself  Remove any means that you might use to hurt yourself (examples: pills, rope, extension cords, firearm)  Get professional help from the community (Mental Health, Substance Abuse, psychological counseling)  Do not be alone:Call your Safe Contact- someone whom you trust who will be there for you.  Call your local CRISIS HOTLINE 412-8129 or [phone]  Call your local Children's Mobile Crisis Response Team [place] (817) 744-5129 or www.[employer]  Call the toll free National Suicide Prevention Hotlines   National Suicide Prevention Lifeline 434-483-EIXV (9981)  National Hope Line Network 800-SUICIDE (560-1779)

## 2022-06-21 NOTE — CARE PLAN
Problem: Knowledge Deficit - Standard  Goal: Patient and family/care givers will demonstrate understanding of plan of care, disease process/condition, diagnostic tests and medications  Outcome: Progressing     Problem: Pain - Standard  Goal: Alleviation of pain or a reduction in pain to the patient’s comfort goal  Outcome: Progressing     The patient is Watcher - Medium risk of patient condition declining or worsening    Shift Goals  Clinical Goals: Blood transfusions and chemotherapy administration  Patient Goals: Pain control and rest  Family Goals: Stay updated on plan of care    Progress made toward(s) clinical / shift goals:  Patient tolerated platelets and RBC transfusions. Patient tolerated chemotherapy infusion. Patient resting comfortably at this time. Mother updated on plan of care prior to leaving for the night.     Patient is not progressing towards the following goals: NA

## 2022-06-21 NOTE — PROCEDURES
Pediatric Oncology Lumbar Puncture  Procedure Note      Patient Name:  Tomas Jean-Baptiste  : 2000   MRN: [id number]    Service Location:  [facility]  Date of Service: 2022  Time: 4:45 PM    Procedure Performed By: Pepe Faye M.D.    Pre-procedural Diagnosis:  Ph+ Pre B-Cell Acute Lymphoblastic Leukemia not yet having achieved remission  Post-procedural Diagnosis: Ph+ Pre B-Cell Acute Lymphoblastic Leukemia not yet having achieved remission    Procedure:  Lumbar Puncture with administration of intrathecal chemotherapy    Analgesia:  EMLA cream    Anxiolysis:  Versed 2 mg IV x 1 dose    Intrathecal Chemotherapy:  Unable to give     Needle Size:  22 gauge, 3.5 in  Site: L3-L4  Number of Attempts: 4  Fluid: Unable to obtain    Complications:  Unable to obtain sample    Procedure Note:    Tomas Jean-Baptiste is a 21 y.o. male diagnosed with Ph+ Precursor B-Cell AcuteLymphoblastic Leukemia not yet having achieved remission.  He presents today for scheduled chemotherapy, Day 22 of Induction 1A Part 2 and scheduled lumbar puncture with intrathecal chemotherapy for CNS3c disease.  Prior to the procedure, the risks and benefits were discussed with the patient.  Consent for the procedure was signed by the patient himself and placed in the his chart.  All pertinent labs and history were reviewed and a complete History and Physical Examination were performed and placed in the medical record.  Given thrombocytopenia, 1 unit of platelets was transfused and completed prior to the procedure being performed.    Tomas Roy was then taken to the procedure room where the procedure was performed.  All necessary safety equipment per ASA guidelines were available.  A time-out was performed and the patient identified by name,  and medical record number.  Gloves and mask were worn during the entire procedure.  Tomas Roy was prepped and draped in the  usual sterile fashion with povoiodine.  He was positioned in the left decubitus position and all landmarks including superior posterior iliac crest, umbilicus and vertebral bodies were identified by palpation.  A 3.5 in, 22 guague spinal needle was introduced into the L3-L4 spinal interspace however unable to obtain CSF.  A second attempt was made at the same level resulting in a frankly bloody sample.  As such, no chemotherapy administration was attempted.  Dr. Rodriguez was called to aid in the procedure and attempted any additional 2 x without success.  At this time, the procedure was cancelled and JULY was allowed to recover.  Following procedure, an additional unit of platelets was administered.  Tomas Roy otherwise tolerated the procedure well.   Another attempt will likely occur tomorrow.    Pepe Faye MD  Pediatric Hematology / Oncology  [facility]  Cell.  [phone]

## 2022-06-21 NOTE — PROGRESS NOTES
1800: Pt arrived to Peds floor from [facility] with RN and mother at bedside. Pt able to slide from gurney to bed and tolerated. Pt and family educated on POC - verbalized understanding. Patient provided with call light and encouraged to call nurse for questions and needs.

## 2022-06-26 RX ORDER — PROMETHAZINE HYDROCHLORIDE 25 MG/1
25 TABLET ORAL EVERY 6 HOURS PRN
Status: CANCELLED | OUTPATIENT
Start: 2022-06-27

## 2022-06-26 RX ORDER — SODIUM CHLORIDE 9 MG/ML
500 INJECTION, SOLUTION INTRAVENOUS CONTINUOUS
Status: CANCELLED | OUTPATIENT
Start: 2022-06-27

## 2022-06-26 RX ORDER — LIDOCAINE AND PRILOCAINE 25; 25 MG/G; MG/G
CREAM TOPICAL PRN
Status: CANCELLED | OUTPATIENT
Start: 2022-06-27

## 2022-06-26 RX ORDER — ONDANSETRON 2 MG/ML
8 INJECTION INTRAMUSCULAR; INTRAVENOUS EVERY 8 HOURS PRN
Status: CANCELLED | OUTPATIENT
Start: 2022-06-27

## 2022-06-26 RX ORDER — ONDANSETRON 2 MG/ML
8 INJECTION INTRAMUSCULAR; INTRAVENOUS ONCE
Status: CANCELLED | OUTPATIENT
Start: 2022-06-27

## 2022-06-26 RX ORDER — LORAZEPAM 2 MG/ML
1 INJECTION INTRAMUSCULAR EVERY 6 HOURS PRN
Status: CANCELLED | OUTPATIENT
Start: 2022-06-27

## 2022-06-27 ENCOUNTER — HOSPITAL ENCOUNTER (OUTPATIENT)
Dept: INFUSION CENTER | Facility: MEDICAL CENTER | Age: 22
End: 2022-06-27
Attending: PEDIATRICS
Payer: COMMERCIAL

## 2022-06-27 ENCOUNTER — PHARMACY VISIT (OUTPATIENT)
Dept: PHARMACY | Facility: MEDICAL CENTER | Age: 22
End: 2022-06-27
Payer: COMMERCIAL

## 2022-06-27 ENCOUNTER — PATIENT OUTREACH (OUTPATIENT)
Dept: PEDIATRIC HEMATOLOGY/ONCOLOGY | Facility: OUTPATIENT CENTER | Age: 22
End: 2022-06-27

## 2022-06-27 VITALS
SYSTOLIC BLOOD PRESSURE: 114 MMHG | HEIGHT: 65 IN | WEIGHT: 138.01 LBS | OXYGEN SATURATION: 97 % | TEMPERATURE: 98.6 F | DIASTOLIC BLOOD PRESSURE: 58 MMHG | RESPIRATION RATE: 20 BRPM | BODY MASS INDEX: 22.99 KG/M2 | HEART RATE: 83 BPM

## 2022-06-27 DIAGNOSIS — C91.Z0 B LYMPHOBLASTIC LEUKEMIA WITH T(9;22)(Q34;Q11.2);BCR-ABL1 (HCC): ICD-10-CM

## 2022-06-27 DIAGNOSIS — R74.01 TRANSAMINITIS: ICD-10-CM

## 2022-06-27 DIAGNOSIS — E88.09 HYPOALBUMINEMIA: ICD-10-CM

## 2022-06-27 DIAGNOSIS — H53.2 DIPLOPIA: ICD-10-CM

## 2022-06-27 DIAGNOSIS — E87.6 HYPOKALEMIA: ICD-10-CM

## 2022-06-27 DIAGNOSIS — Z51.11 ENCOUNTER FOR ANTINEOPLASTIC CHEMOTHERAPY: ICD-10-CM

## 2022-06-27 DIAGNOSIS — C91.00 PHILADELPHIA CHROMOSOME POSITIVE ACUTE LYMPHOBLASTIC LEUKEMIA (HCC): ICD-10-CM

## 2022-06-27 LAB
ALBUMIN SERPL BCP-MCNC: 2.2 G/DL (ref 3.2–4.9)
ALBUMIN/GLOB SERPL: 1.4 G/DL
ALP SERPL-CCNC: 72 U/L (ref 30–99)
ALT SERPL-CCNC: 262 U/L (ref 2–50)
ANION GAP SERPL CALC-SCNC: 12 MMOL/L (ref 7–16)
ANISOCYTOSIS BLD QL SMEAR: ABNORMAL
AST SERPL-CCNC: 88 U/L (ref 12–45)
BASOPHILS # BLD AUTO: 0 % (ref 0–1.8)
BASOPHILS # BLD: 0 K/UL (ref 0–0.12)
BILIRUB SERPL-MCNC: 1 MG/DL (ref 0.1–1.5)
BUN SERPL-MCNC: 15 MG/DL (ref 8–22)
BURR CELLS/RBC NFR CSF MANUAL: 0 %
CA-I SERPL-SCNC: 1.1 MMOL/L (ref 1.1–1.3)
CALCIUM SERPL-MCNC: 5.9 MG/DL (ref 8.5–10.5)
CHLORIDE SERPL-SCNC: 110 MMOL/L (ref 96–112)
CLARITY CSF: NORMAL
CO2 SERPL-SCNC: 17 MMOL/L (ref 20–33)
COLOR CSF: NORMAL
CREAT SERPL-MCNC: 0.46 MG/DL (ref 0.5–1.4)
CSF COMMENTS 1658: NORMAL
EOSINOPHIL # BLD AUTO: 0 K/UL (ref 0–0.51)
EOSINOPHIL NFR BLD: 0 % (ref 0–6.9)
ERYTHROCYTE [DISTWIDTH] IN BLOOD BY AUTOMATED COUNT: 48.4 FL (ref 35.9–50)
GFR SERPLBLD CREATININE-BSD FMLA CKD-EPI: 153 ML/MIN/1.73 M 2
GLOBULIN SER CALC-MCNC: 1.6 G/DL (ref 1.9–3.5)
GLUCOSE SERPL-MCNC: 77 MG/DL (ref 65–99)
HCT VFR BLD AUTO: 25.8 % (ref 42–52)
HGB BLD-MCNC: 8.4 G/DL (ref 14–18)
LG PLATELETS BLD QL SMEAR: NORMAL
LYMPHOCYTES # BLD AUTO: 0.17 K/UL (ref 1–4.8)
LYMPHOCYTES NFR BLD: 23.7 % (ref 22–41)
LYMPHOCYTES NFR CSF: 61 %
MACROCYTES BLD QL SMEAR: ABNORMAL
MANUAL DIFF BLD: NORMAL
MCH RBC QN AUTO: 30.5 PG (ref 27–33)
MCHC RBC AUTO-ENTMCNC: 32.6 G/DL (ref 33.7–35.3)
MCV RBC AUTO: 93.8 FL (ref 81.4–97.8)
MICROCYTES BLD QL SMEAR: ABNORMAL
MONOCYTES # BLD AUTO: 0.04 K/UL (ref 0–0.85)
MONOCYTES NFR BLD AUTO: 5.1 % (ref 0–13.4)
MONONUC CELLS NFR CSF: 3 %
MORPHOLOGY BLD-IMP: NORMAL
NEUTROPHILS # BLD AUTO: 0.5 K/UL (ref 1.82–7.42)
NEUTROPHILS NFR BLD: 67.8 % (ref 44–72)
NEUTROPHILS NFR CSF: 36 %
NEUTS BAND NFR BLD MANUAL: 3.4 % (ref 0–10)
NRBC # BLD AUTO: 0.1 K/UL
NRBC # CSF: 8 10*3/UL
NRBC BLD-RTO: 14.5 /100 WBC
PATHOLOGY CONSULT NOTE: NORMAL
PLATELET # BLD AUTO: 43 K/UL (ref 164–446)
PLATELET BLD QL SMEAR: NORMAL
PMV BLD AUTO: 12.3 FL (ref 9–12.9)
POTASSIUM SERPL-SCNC: 3.3 MMOL/L (ref 3.6–5.5)
PROT SERPL-MCNC: 3.8 G/DL (ref 6–8.2)
RBC # BLD AUTO: 2.75 M/UL (ref 4.7–6.1)
RBC # CSF: NORMAL CELLS/UL
RBC BLD AUTO: PRESENT
SMUDGE CELLS BLD QL SMEAR: NORMAL
SODIUM SERPL-SCNC: 139 MMOL/L (ref 135–145)
SPECIMEN VOL CSF: 1 ML
TUBE # CSF: 1
TUBE # CSF: 1
WBC # BLD AUTO: 0.7 K/UL (ref 4.8–10.8)
WBC # CSF: 2 CELLS/UL (ref 0–10)

## 2022-06-27 PROCEDURE — 38220 DX BONE MARROW ASPIRATIONS: CPT

## 2022-06-27 PROCEDURE — 96450 CHEMOTHERAPY INTO CNS: CPT | Performed by: PEDIATRICS

## 2022-06-27 PROCEDURE — 96450 CHEMOTHERAPY INTO CNS: CPT

## 2022-06-27 PROCEDURE — 38220 DX BONE MARROW ASPIRATIONS: CPT | Performed by: PEDIATRICS

## 2022-06-27 PROCEDURE — 88184 FLOWCYTOMETRY/ TC 1 MARKER: CPT

## 2022-06-27 PROCEDURE — 80053 COMPREHEN METABOLIC PANEL: CPT

## 2022-06-27 PROCEDURE — 503422 HCHG CHILDRENS ANESTHESIA

## 2022-06-27 PROCEDURE — 82330 ASSAY OF CALCIUM: CPT

## 2022-06-27 PROCEDURE — 36592 COLLECT BLOOD FROM PICC: CPT

## 2022-06-27 PROCEDURE — 85007 BL SMEAR W/DIFF WBC COUNT: CPT

## 2022-06-27 PROCEDURE — 85025 COMPLETE CBC W/AUTO DIFF WBC: CPT

## 2022-06-27 PROCEDURE — 700101 HCHG RX REV CODE 250: Performed by: PEDIATRICS

## 2022-06-27 PROCEDURE — 700111 HCHG RX REV CODE 636 W/ 250 OVERRIDE (IP): Performed by: PEDIATRICS

## 2022-06-27 PROCEDURE — 700105 HCHG RX REV CODE 258: Performed by: PEDIATRICS

## 2022-06-27 PROCEDURE — RXMED WILLOW AMBULATORY MEDICATION CHARGE: Performed by: PEDIATRICS

## 2022-06-27 PROCEDURE — 88185 FLOWCYTOMETRY/TC ADD-ON: CPT | Mod: 91

## 2022-06-27 PROCEDURE — 88108 CYTOPATH CONCENTRATE TECH: CPT

## 2022-06-27 PROCEDURE — 89051 BODY FLUID CELL COUNT: CPT

## 2022-06-27 PROCEDURE — 99215 OFFICE O/P EST HI 40 MIN: CPT | Mod: 25 | Performed by: PEDIATRICS

## 2022-06-27 RX ORDER — ONDANSETRON 2 MG/ML
8 INJECTION INTRAMUSCULAR; INTRAVENOUS EVERY 8 HOURS PRN
Status: DISCONTINUED | OUTPATIENT
Start: 2022-06-27 | End: 2022-06-28 | Stop reason: HOSPADM

## 2022-06-27 RX ORDER — LIDOCAINE AND PRILOCAINE 25; 25 MG/G; MG/G
1 CREAM TOPICAL PRN
Status: DISCONTINUED | OUTPATIENT
Start: 2022-06-27 | End: 2022-06-28 | Stop reason: HOSPADM

## 2022-06-27 RX ORDER — LORAZEPAM 2 MG/ML
1 INJECTION INTRAMUSCULAR EVERY 6 HOURS PRN
Status: DISCONTINUED | OUTPATIENT
Start: 2022-06-27 | End: 2022-06-28 | Stop reason: HOSPADM

## 2022-06-27 RX ORDER — LIDOCAINE AND PRILOCAINE 25; 25 MG/G; MG/G
CREAM TOPICAL PRN
Status: DISCONTINUED | OUTPATIENT
Start: 2022-06-27 | End: 2022-06-28 | Stop reason: HOSPADM

## 2022-06-27 RX ORDER — ONDANSETRON 2 MG/ML
8 INJECTION INTRAMUSCULAR; INTRAVENOUS ONCE
Status: DISCONTINUED | OUTPATIENT
Start: 2022-06-27 | End: 2022-06-28 | Stop reason: HOSPADM

## 2022-06-27 RX ORDER — PROMETHAZINE HYDROCHLORIDE 25 MG/1
25 TABLET ORAL EVERY 6 HOURS PRN
Status: DISCONTINUED | OUTPATIENT
Start: 2022-06-27 | End: 2022-06-28 | Stop reason: HOSPADM

## 2022-06-27 RX ORDER — SODIUM CHLORIDE 0.9 % (FLUSH) 0.9 %
10 SYRINGE (ML) INJECTION EVERY 12 HOURS
Qty: 600 ML | Refills: 0 | Status: ON HOLD | OUTPATIENT
Start: 2022-06-27 | End: 2022-08-29

## 2022-06-27 RX ORDER — SODIUM CHLORIDE 9 MG/ML
INJECTION, SOLUTION INTRAVENOUS CONTINUOUS
Status: DISCONTINUED | OUTPATIENT
Start: 2022-06-27 | End: 2022-06-28 | Stop reason: HOSPADM

## 2022-06-27 RX ADMIN — FENTANYL CITRATE 50 MCG: 50 INJECTION, SOLUTION INTRAMUSCULAR; INTRAVENOUS at 11:45

## 2022-06-27 RX ADMIN — LIDOCAINE AND PRILOCAINE 5 G: 25; 25 CREAM TOPICAL at 10:50

## 2022-06-27 RX ADMIN — SODIUM CHLORIDE: 9 INJECTION, SOLUTION INTRAVENOUS at 11:23

## 2022-06-27 RX ADMIN — METHOTREXATE 12 MG: 25 INJECTION INTRA-ARTERIAL; INTRAMUSCULAR; INTRATHECAL; INTRAVENOUS at 11:37

## 2022-06-27 RX ADMIN — PROPOFOL 260 MG: 10 INJECTION, EMULSION INTRAVENOUS at 11:24

## 2022-06-27 ASSESSMENT — PAIN DESCRIPTION - PAIN TYPE: TYPE: ACUTE PAIN

## 2022-06-27 ASSESSMENT — FIBROSIS 4 INDEX: FIB4 SCORE: 2.886751345948128823

## 2022-06-27 NOTE — PROCEDURES
Pediatric Oncology Lumbar Puncture  Procedure Note      Patient Name:  Tomas Jean-Baptiste  : 2000   MRN: [id number]    Service Location:  [facility]  Date of Service: 2022  Time: 12:00 PM    Procedure Performed By: ANN MARIE Rodriguez MD    Pre-procedural Diagnosis:  [place] chromosome-positive Acute B-Lymphoblastic Leukemia (C91.00)  Post-procedural Diagnosis: [place] chromosome-positive Acute B-Lymphoblastic Leukemia (C91.00)    Procedure:  Lumbar Puncture with administration of intrathecal chemotherapy    Sedation:  Fentanyl/Propofol per PICU (Dr. Redmond), EMLA cream    Intrathecal Chemotherapy:  Yes  Chemotherapy Administered:  Methotrexate 12 mg IT (in 6 mL NS)    Needle Size:  22 gauge, 3.5 in  Site: L3-L4  Number of Attempts: 2  Fluid:  <1 ml blood-tinged fluid obtained  Labs: Cell count, cytology    Complications:  None    Procedure Note:    Tomas Jean-Baptiste is a 21 y.o. male diagnosed with [place] chromosome-positive  Acute B-Lymphoblastic Leukemia (C91.00).  JULY is just completing the Induction 1A phase of therapy and presents for scheduled treatment.  Prior to the procedure, the risks and benefits were discussed with the patient and family.  Consent for the procedure was signed by parent and placed in the patient's chart.  All pertinent labs and history were reviewed and a complete History and Physical Examination were performed and placed in the medical record.  Patient was then taken to the procedure room where the procedure was performed.  All necessary safety equipment per ASA guidelines were available.  A time-out was performed and the patient identified by name,  and medical record number.   Patient was prepped and draped in the usual sterile fashion with povoiodine.  Tomas Roy was positioned in the left lateral decubitus position and all landmarks including superior posterior iliac crest and vertebral  bodies were identified by palpation.  A 3.5 in, 22 gauge spinal needle was introduced into the L3-L4 spinal interspace.  Less than 1 mL of blood-tinged (gradually clearing) fluid was obtained and sent for cell count and cytology.  Methotrexate 12 mg in NS was verified with the nurse bedside and then administered into the spinal fluid. Tomas Roy tolerated the procedure without complication or bleeding.  JULY and his mother were instructed that he should lie flat for 1 hour following the procedure.    Results:    PENDING    ANN MARIE Rodriguez MD  Pediatric Hematology / Oncology  [facility]  Cell.  [phone]

## 2022-06-27 NOTE — PROGRESS NOTES
Medical Social Work    Dr. Rodriguez reached out to  to inquire about assistance with payment for saline flushes for his PICC line. Dr. Rodriguez stated patient's insurance is refusing to cover this, and trying to appeal the decision would take to long as patient needs the syringes starting today. REGIS notified Dr. Rodriguez, [facility] can assist with this payment and will reach out to them for arrangements.     [facility] contacted [facility] and spoke with Saundra and provided [facility] contact number to call to make payment for medication. [facility] will assist in obtaining receipt following payment completion and [facility] has picked up medication.

## 2022-06-27 NOTE — PROGRESS NOTES
PT to [facility] for [facility] with IT Methotrexate and bone marrow aspiration, accompanied by mother.      Afebrile.  VSS. PICC line accessed and labs drawn and sent. PICC line dressing change completed as well.  PT tolerated well.      Adonay from Lab called with critical result of Calcium at 5.9 at 1011. Critical lab result read back to Adonay.   Dr. Rodriguez notified of critical lab result at 1011.  Critical lab result read back by Dr. Rodriguez.    Second green top sent to lab for Ionized Calcium.    Marifer from Lab called with critical result of WBC 0.7 at 1020. Critical lab result read back to Marifer.   Dr. Rodriguez notified of critical lab result at 1020.  Critical lab result read back by Dr. Rodriguez.    Ok'd by Dr. Redmond and Dr. Rodriguez to proceed with procedure.      Verified patency prior to procedure.   Sedation performed by Dr. Redmond, procedure performed by Dr. Rodriguez.      Start Time: 1124  IT Methotrate: 1137  Bone Marrow stop time at 1147     Monitored PT q5min and documented VS q10min per protocol.  LP with IT methotrexate and bone marrow aspiration completed at 1157.   See MAR for medication adminsitration.  No unexpected events.  PT woke from sedation without complications.      Stop time: 1157    PT tolerated regular diet and ambulated independently. Pt signed d/c instructions PICC flushed with 20ml NS.  Pt instructed that results will be made available to the ordering provider and to contact that provider for follow-up.  Discharged home with mother once discharge criteria met.

## 2022-06-27 NOTE — PROGRESS NOTES
"Pediatric Hematology / Oncology  Progress Note      Patient Name:  Tomas Jean-Baptiste  : 2000   MRN: [id number]    Location of Service:  [facility]  Date of Service: 2022  Time: 9:33 AM    Primary Care Physician: Margarito Arvizu M.D.    Protocol/Treatment Plan:    HISTORY OF PRESENT ILLNESS:     Chief Complaint: Here for Day 29 procedures. Still c/o double vision and (this morning) photophobia.    History of Present Illness: Tomas Jean-Baptiste is a 21 y.o. male who presents to the [facility] for scheduled procedures.  Today is Day 29 of Induction 1A treatment on protocol [id number] for [place] chromosome-positive Acute B-Lymphoblastic Leukemia.     JULY has done \"pretty well\" since his brief admission last week.  He still has double vision (and his mother would like help finalizing his follow-up ophthalmology appointment).    His mother reports 100% compliance with oral chemotherapy doses since last visit.  Imatinib 600 mg PO daily, prednisone 60 mg PO b.i.d.  (His final ose of prednisone will be given later today, since he is NPO on arrival.)    Review of Systems:     Constitutional: Afebrile. \"OK\" appetite; decreased energy.  HENT: Negative for nosebleeds, Uri symptoms, mouth sores.  Respiratory: Negative for shortness of breath or cough. .    Gastrointestinal: Negative for nausea, vomiting, abdominal pain, diarrhea, constipation and blood in stool.      Musculoskeletal: Right calf pain persists (better).    Skin: Negative for rash, signs of infection.  Neurological: Negative for focal weakness or headaches.    Endo/Heme/Allergies: Does not bruise/bleed easily.        PAST MEDICAL HISTORY:       Allergies:   Allergies as of 2022 - Reviewed 2022   Allergen Reaction Noted   • Amoxicillin Rash 2020       Home Medications:    Current Outpatient Medications   Medication Sig Dispense " Refill   • predniSONE (DELTASONE) 20 MG Tab Take 3 Tablets by mouth 2 times a day for 4 days. ENDING TODAY 24 Tablet 0   • sulfamethoxazole-trimethoprim (BACTRIM) 400-80 MG Tab Take 2 tablets by mouth twice daily every Saturday and Sunday 40 Tablet 11   • OLANZapine (ZYPREXA) 5 MG Tab Take 1 Tablet by mouth every evening. 30 Tablet 0   • imatinib (GLEEVEC) 400 MG tablet Take 1.5 Tablets by mouth every day for 30 days. 45 Tablet 5   • ondansetron (ZOFRAN ODT) 8 MG TABLET DISPERSIBLE Dissovle 1 Tablet by mouth every 8 hours as needed for Nausea. 20 Tablet 0   • senna-docusate (PERICOLACE OR SENOKOT S) 8.6-50 MG Tab Take 2 Tablets by mouth every day. 40 Tablet 1   • polyethylene glycol/lytes (MIRALAX) 17 g Pack Mix 1 Packet per package instructions and drink by mouth 1 time a day as needed (if sennosides and docusate ineffective after 24 hours). 10 Each 3   • famotidine (PEPCID) 20 MG Tab Take 1 Tablet by mouth 2 times a day. 60 Tablet 1   • apixaban (ELIQUIS) 5mg Tab Take 2 Tablets by mouth 2 times a day. Starting June 9th, take 1 tablet 2 times a day thereafter. Indications: DVT/PE  HELD LAST NIGHT AND THIS MORNING 70 Tablet 2   • Sodium Chloride Flush (NORMAL SALINE FLUSH) 0.9 % Solution Infuse 10 mL into a venous catheter every 12 hours. 600 mL 0   • ascorbic acid (ASCORBIC ACID) 500 MG Tab Take 500 mg by mouth every day.     • therapeutic multivitamin-minerals (THERAGRAN-M) Tab Take 1 Tab by mouth every day.     • ammonium lactate (LAC-HYDRIN) 12 % Lotion Apply to peeling areas of feet twice daily as needed 500 g 0     Current Facility-Administered Medications   Medication Dose Route Frequency Provider Last Rate Last Admin   • lidocaine-prilocaine (EMLA) 2.5-2.5 % cream   Topical PRN Pepe Faye M.D.       • LORazepam (ATIVAN) injection 1 mg  1 mg Intravenous Q6HRS PRN Pepe Faye M.D.       • promethazine (PHENERGAN) tablet 25 mg  25 mg Oral Q6HRS PRN Pepe P Neyda, M.D.       • ondansetron (ZOFRAN)  "syringe/vial injection 8 mg  8 mg Intravenous Q8HRS PRN Pepe Faye M.D.       • ondansetron (ZOFRAN) syringe/vial injection 8 mg  8 mg Intravenous Once Pepe Faye M.D.       • methotrexate PF 12 mg in normal saline (PF) 6 mL Intrathecal Chemotherapy (PEDS ONC)  12 mg Intrathecal Once Pepe Faye M.D.            OBJECTIVE:     Vitals:   /58   Pulse 83   Temp 37 °C (98.6 °F) (Temporal)   Resp 20   Ht 1.65 m (5' 4.96\")   Wt 62.6 kg (138 lb 0.1 oz)   SpO2 97%     Labs:   Latest Reference Range & Units 06/27/22 09:25   WBC 4.8 - 10.8 K/uL 0.7 (LL)   Hemoglobin 14.0 - 18.0 g/dL 8.4 (L)   Hematocrit 42.0 - 52.0 % 25.8 (L)   Platelet Count 164 - 446 K/uL 43 (L)   Neutrophils-Polys 44.00 - 72.00 % 67.80   Neutrophils (Absolute) 1.82 - 7.42 K/uL 0.50 (L)   Bands-Stabs 0.00 - 10.00 % 3.40   Lymphocytes 22.00 - 41.00 % 23.70   Lymphs (Absolute) 1.00 - 4.80 K/uL 0.17 (L)   Monocytes 0.00 - 13.40 % 5.10   Nucleated RBC /100 WBC 14.50   Sodium 135 - 145 mmol/L 139   Potassium 3.6 - 5.5 mmol/L 3.3 (L)   Chloride 96 - 112 mmol/L 110   Co2 20 - 33 mmol/L 17 (L)   Anion Gap 7.0 - 16.0  12.0   Glucose 65 - 99 mg/dL 77   Bun 8 - 22 mg/dL 15   Creatinine 0.50 - 1.40 mg/dL 0.46 (L)   GFR (CKD-EPI) >60 mL/min/1.73 m 2 153   Calcium 8.5 - 10.5 mg/dL 5.9 (LL)   AST(SGOT) 12 - 45 U/L 88 (H)   ALT(SGPT) 2 - 50 U/L 262 (H)   Alkaline Phosphatase 30 - 99 U/L 72   Total Bilirubin 0.1 - 1.5 mg/dL 1.0   Albumin 3.2 - 4.9 g/dL 2.2 (L)   Total Protein 6.0 - 8.2 g/dL 3.8 (L)   Globulin 1.9 - 3.5 g/dL 1.6 (L)   A-G Ratio g/dL 1.4      [facility] Reference Range & Units 06/27/22 10:34   Ionized Calcium 1.1 - 1.3 mmol/L 1.1       Physical Exam:    Constitutional: Well-developed, well-nourished, and in no distress.    HENT: Normocephalic and atraumatic. No nasal congestion or rhinorrhea. Oropharynx is clear and moist. No oral ulcerations or sores.    Eyes: Squinting (c/o \"bright light\") Conjunctivae are normal. Pupils are equal, " "round, and reactive to light; EOMI. No scleral icterus.    Neck: Normal range of motion of neck, no adenopathy.    Cardiovascular: Normal rate, regular rhythm and normal heart sounds.  No murmur heard. Distal cap refill < 2 sec  Pulmonary/Chest: Effort normal and breath sounds normal.  Symmetric expansion.    Abdomen: Soft. Bowel sounds are normal. No distension and no mass. There is no hepatosplenomegaly.    Genitourinary:  Deferred   Neurological: Alert and oriented to person and place. Exhibits normal muscle tone bilaterally in upper and lower extremities. Gait not assessed.   Skin: Skin is warm, dry and pink.  No rash or evidence of skin infection. Mild pallor.   Psychiatric: Mood and affect normal for age.      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  His platelet count and neutrophil count have trended up over the last week, suggesting imminent bone marrow recovery.  My preliminary review of his bone marrow aspirate reveals hypocellularity with left-shifted trilineage hematopoiesis and no obvious evidence of residual leukemia.  Arguably, this indicates remission, but this will not be considered a \"complete\" remission until we have flow cytometric data showing less than 0.01% residual lymphoblasts.  That result should be available within 2 to 3 days.     Per protocol, given his \"M1\" marrow (less than 5% blasts on microscopy), we will plan to move forward with Induction 1B chemotherapy next week, assuming platelets of at least 50,000 and absolute neutrophil count of at least 500.  Based on today's results, this seems likely.      Relevant Medications    lidocaine-prilocaine (EMLA) 2.5-2.5 % cream    LORazepam (ATIVAN) injection 1 mg    promethazine (PHENERGAN) tablet 25 mg    ondansetron (ZOFRAN) syringe/vial injection 8 mg    " ondansetron (ZOFRAN) syringe/vial injection 8 mg    NS infusion    lidocaine-prilocaine (EMLA) 2.5-2.5 % cream 1 Application    propofol (DIPRIVAN) bolus    propofol (DIPRIVAN) continuous infusion    Other Relevant Orders    BONE MARROW    CBC WITH DIFFERENTIAL (Completed)    Comp Metabolic Panel (Completed)    CSF Cell Count (Completed)    PATHOLOGY MEDICAL CYTOLOGY    Chemotherapy Injection into CNS w/ Lumbar Puncture    Nursing Communication    Prior to Procedure:  Baseline HR, RR, SaO2, Temp, and level of consciousness    Intra-Procedure:   HR, RR, SaO2, and level of sedation every 5 minutes    End of Procedure:  Temperature    Post Procedure:  HR, RR, SaO2, and level of sedation every 10 min    Notify provider performing sedation of patients arrival    Notify provider when the following discharge criteria is met    NPO prior to procedure (2 hours for clear liquids, 4 hours for breast milk, 6 hours for solids)    VERIFY CONSENT HAS BEEN OBTAINED    Setup Equipment and Items at Bedside Prior to Procedure (coordinate with RT)    Obtain peripheral IV access or access central catheter    Pulse Ox    End Tidal CO2 Monitoring Continuous During Procedure    Setup Equipment and Items at Bedside Prior to Procedure (coordinate with RN)    Encounter for antineoplastic chemotherapy     See separately dictated procedure notes for bone marrow aspiration and lumbar puncture with intrathecal methotrexate 12 mg, per protocol.     JULY will take 1 final dose of prednisone today to complete a 28-day course.  He will continue imatinib 600 mg by mouth daily.      Relevant Medications    lidocaine-prilocaine (EMLA) 2.5-2.5 % cream    LORazepam (ATIVAN) injection 1 mg    promethazine (PHENERGAN) tablet 25 mg    ondansetron (ZOFRAN) syringe/vial injection 8 mg    ondansetron (ZOFRAN) syringe/vial injection 8 mg    NS infusion    lidocaine-prilocaine (EMLA) 2.5-2.5 % cream 1 Application    propofol (DIPRIVAN) bolus    propofol (DIPRIVAN)  "continuous infusion    Other Relevant Orders    BONE MARROW    CBC WITH DIFFERENTIAL (Completed)    Comp Metabolic Panel (Completed)    CSF Cell Count (Completed)    PATHOLOGY MEDICAL CYTOLOGY    Chemotherapy Injection into CNS w/ Lumbar Puncture    Nursing Communication    Prior to Procedure:  Baseline HR, RR, SaO2, Temp, and level of consciousness    Intra-Procedure:   HR, RR, SaO2, and level of sedation every 5 minutes    End of Procedure:  Temperature    Post Procedure:  HR, RR, SaO2, and level of sedation every 10 min    Notify provider performing sedation of patients arrival    Notify provider when the following discharge criteria is met    NPO prior to procedure (2 hours for clear liquids, 4 hours for breast milk, 6 hours for solids)    VERIFY CONSENT HAS BEEN OBTAINED    Setup Equipment and Items at Bedside Prior to Procedure (coordinate with RT)    Obtain peripheral IV access or access central catheter    Pulse Ox    End Tidal CO2 Monitoring Continuous During Procedure    Setup Equipment and Items at Bedside Prior to Procedure (coordinate with RN)    Diplopia     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  I called his office today to expedite follow-up appointment.      Transaminitis     AST and ALT have trended up over the last 2 weeks.  ALT elevation is > 5xULN, thus \"Grade 3.\"  No intervention at this time.      Hypokalemia     Mild, asymptomatic (Grade 1).  No intervention at this time.      Hypoalbuminemia     Grade 2.  Discussed with mother: try to increase protein intake, as tolerated.      \"Hypo\"calcemia     Total calcium is quite low but ionized calcium is within normal limits; this does not meet CTCAE criteria for hypocalcemia.      Tentatively, JULY will RTC on July 5 to begin his next phase of chemotherapy.  Today, I provided his mother with a treatment calendar summarizing the next 30 days of treatment.    More than 50% of today's 50-minute face-to-face visit was spent on " counseling and coordination of care.    ANN MARIE Rodriguez MD  Pediatric Hematology / Oncology  [facility]  Cell.  [phone]  [facility]. [phone]

## 2022-06-27 NOTE — PROGRESS NOTES
"Pharmacy Chemotherapy Verification Note:    Dx: HR B-ALL        Protocol and Dosing Reference: Induction IA part 2([id number])      Allergies:  Amoxicillin     /58   Pulse 83   Temp 37 °C (98.6 °F) (Temporal)   Resp 20   Ht 1.65 m (5' 4.96\")   Wt 62.6 kg (138 lb 0.1 oz)   SpO2 97%   BMI 22.99 kg/m²  Body surface area is 1.69 meters squared.    MD to review any labs. No hold parameters for this treatment day  5/30/22 ECHO LVEF 75%     Drug Order   (Drug name, dose, route, IV Fluid & volume, frequency, number of doses) Cycle: induction Day 29  Previous treatment: Induction D22 6/20/22     Medication = methotrexate  Base Dose = 12 mg fixed dose for age  Calc Dose: no calc req'd  Final Dose = 12 mg  Route = intraTHECAL  Fluid & Volume = pfNS 6 mL  Admin Duration = IT inj by MD only   To be given by MD      <10% difference, okay to treat with final dose      By my signature below, I confirm this process was performed independently with the BSA and all final chemotherapy dosing calculations congruent. I have reviewed the above chemotherapy order and that my calculation of the final dose and BSA (when applicable) corroborate those calculations of the  pharmacist.     JAE Wilson, Pharm.D.          "

## 2022-06-27 NOTE — PROCEDURES
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 Redmond), subcutaneous and periosteal lidocaine injection for local pain control    Needle Size:  13 gauge bone marrow biopsy needle  Site: Right posterior iliac crest    Complications: None  Bleeding: <5 mL    Procedure Note:    Tomas Jean-Baptiste is a 21 y.o. male with [place] chromosome-positive B-precursor ALL; he has just completed the first portion of induction therapy and is undergoing routine \"Day 29\" bone marrow reevaluation.. Prior to the procedure, the risks and benefits were discussed with the patient and family.  Consent for the procedure was signed by patient at bedside and placed in hischart.  All pertinent labs and history were reviewed and a complete physical examination performed prior to the procedure.  All necessary safety equipment per ASA guidelines were available.  A time-out was performed and the patient identified by name,  and medical record number. The patient was prepped and draped in the usual sterile fashion and the site was cleansed with povoiodine (Betadine).    The patient was placed in the left lateral decubitus position.   All bony landmarks were palpated including both iliac crests and vertebral bodies.  The subcutaneous tissue and periosteum of the right superior posterior iliac crest were infiltrated with lidocaine.  An initial insertion was made with a 13 gauge biopsy needle and resulted in freely flowing marrow for slides as well as an adequate specimen for " send-out flow cytometry and MRD detection at [facility]. The patient tolerated the procedure without complications and only minimal bleeding.      Results:      ANN MARIE Rodriguez MD  Pediatric Hematology / Oncology  [facility]  Cell. [phone]

## 2022-06-27 NOTE — PROGRESS NOTES
Pediatric Intensivist Consultation   for   Deep Sedation     Date: 6/27/2022     Time: 9:48 AM        Asked by Dr Rodriguez to consult for sedation services    Chief complaint:  Pre-Bcell ALL    Allergies:   Allergies   Allergen Reactions   • Amoxicillin Rash     Reacted as an infant. No swelling or airway problems.       Details of Present Illness:  Tomas Roy  is a 21 y.o.  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 He was diagnosed with a high disease burden and induction was complicated by febrile neutropenia. JULY was sedated for an LP, bone marrow and PICC placement during his initial hospitalization with ketamine and had some emergence psychosis. He did well with 100mg Propofol for his first outpatient LP last month. Today, I have been asked to provide sedation for both a scheduled LP and bone marrow biopsy. He continues in induction phase of chemo roadmap. Today, I reviewed all of the sedative options with both JULY and his mother. No recent concerns or illness.    Reviewed past and family history, no contraindications for proceding with sedation. Patient has had no URI sx, no vomiting or diarrhea, no change in appetite.  No h/o complications with sedation, no h/o snoring or apnea.    No past medical history on file.    Social History     Socioeconomic History   • Marital status: Single     Spouse name: Not on file   • Number of children: Not on file   • Years of education: Not on file   • Highest education level: Not on file   Occupational History   • Not on file   Tobacco Use   • Smoking status: Never Smoker   • Smokeless tobacco: Never Used   Vaping Use   • Vaping Use: Never used   Substance and Sexual Activity   • Alcohol use: Never   • Drug use: Never   • Sexual activity: Not Currently   Other Topics Concern   • Behavioral problems Not Asked   • Interpersonal relationships Not Asked   • Sad or not enjoying activities Not Asked   • Suicidal  thoughts Not Asked   • Poor school performance Not Asked   • Reading difficulties Not Asked   • Speech difficulties Not Asked   • Writing difficulties Not Asked   • Inadequate sleep Not Asked   • Excessive TV viewing Not Asked   • Excessive video game use Not Asked   • Inadequate exercise Not Asked   • Sports related Not Asked   • Poor diet Not Asked   • Family concerns for drug/alcohol abuse Not Asked   • Poor oral hygiene Not Asked   • Bike safety Not Asked   • Family concerns vehicle safety Not Asked   Social History Narrative   • Not on file     Social Determinants of Health     Financial Resource Strain: Not on file   Food Insecurity: Not on file   Transportation Needs: Not on file   Physical Activity: Not on file   Stress: Not on file   Social Connections: Not on file   Intimate Partner Violence: Not on file   Housing Stability: Not on file     Pediatric History   Patient Parents   • Nabeel,Olive (Mother)     Other Topics Concern   • Behavioral problems Not Asked   • Interpersonal relationships Not Asked   • Sad or not enjoying activities Not Asked   • Suicidal thoughts Not Asked   • Poor school performance Not Asked   • Reading difficulties Not Asked   • Speech difficulties Not Asked   • Writing difficulties Not Asked   • Inadequate sleep Not Asked   • Excessive TV viewing Not Asked   • Excessive video game use Not Asked   • Inadequate exercise Not Asked   • Sports related Not Asked   • Poor diet Not Asked   • Family concerns for drug/alcohol abuse Not Asked   • Poor oral hygiene Not Asked   • Bike safety Not Asked   • Family concerns vehicle safety Not Asked   Social History Narrative   • Not on file       Family History   Problem Relation Age of Onset   • No Known Problems Mother    • Diabetes Paternal Grandfather    • Hypertension Paternal Grandfather    • Hyperlipidemia Paternal Grandfather    • Cancer Neg Hx    • Heart Disease Neg Hx    • Stroke Neg Hx        Review of Body Systems: Pertinent issues  "noted in HPI, full review of 10 systems reveals no other significant concerns.    NPO status:   Greater than 8 hours since taking solids and greater than 6 hours of clears or formula or Breast milk      Physical Exam:  Blood pressure 110/64, pulse (!) 119, temperature 36.6 °C (97.8 °F), temperature source Temporal, resp. rate 18, height 1.65 m (5' 4.96\"), weight 62.6 kg (138 lb 0.1 oz), SpO2 100 %.    General appearance: nontoxic, alert, well nourished, cooperative and calm  HEENT: NC/AT, PERRL, EOMI, nares clear, MMM, neck supple  Lungs: CTAB, good AE without wheeze or rales  Heart:: RRR, no murmur or gallop, full and equal pulses  Abd: soft, NT/ND, NABS  Ext: warm, well perfused, SUAREZ, PICC in place in right upper arm  Neuro: intact exam, no gross motor or sensory deficits  Skin: no rash, petechiae or purpura    Current Outpatient Medications on File Prior to Encounter   Medication Sig Dispense Refill   • predniSONE (DELTASONE) 20 MG Tab Take 3 Tablets by mouth 2 times a day for 4 days. 24 Tablet 0   • sulfamethoxazole-trimethoprim (BACTRIM) 400-80 MG Tab Take 2 tablets by mouth twice daily every Saturday and Sunday 40 Tablet 11   • OLANZapine (ZYPREXA) 5 MG Tab Take 1 Tablet by mouth every evening. 30 Tablet 0   • imatinib (GLEEVEC) 400 MG tablet Take 1.5 Tablets by mouth every day for 30 days. 45 Tablet 5   • ondansetron (ZOFRAN ODT) 8 MG TABLET DISPERSIBLE Dissovle 1 Tablet by mouth every 8 hours as needed for Nausea. 20 Tablet 0   • senna-docusate (PERICOLACE OR SENOKOT S) 8.6-50 MG Tab Take 2 Tablets by mouth every day. 40 Tablet 1   • polyethylene glycol/lytes (MIRALAX) 17 g Pack Mix 1 Packet per package instructions and drink by mouth 1 time a day as needed (if sennosides and docusate ineffective after 24 hours). 10 Each 3   • famotidine (PEPCID) 20 MG Tab Take 1 Tablet by mouth 2 times a day. 60 Tablet 1   • apixaban (ELIQUIS) 5mg Tab Take 2 Tablets by mouth 2 times a day. Starting June 9th, take 1 tablet 2 " times a day thereafter. Indications: DVT/PE 70 Tablet 2   • Sodium Chloride Flush (NORMAL SALINE FLUSH) 0.9 % Solution Infuse 10 mL into a venous catheter every 12 hours. 600 mL 0   • ascorbic acid (ASCORBIC ACID) 500 MG Tab Take 500 mg by mouth every day.     • therapeutic multivitamin-minerals (THERAGRAN-M) Tab Take 1 Tab by mouth every day.     • ammonium lactate (LAC-HYDRIN) 12 % Lotion Apply to peeling areas of feet twice daily as needed 500 g 0     No current facility-administered medications on file prior to encounter.         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);BCR-ABL1 ([facility]) 06/09/2022   • Encounter for antineoplastic chemotherapy 06/09/2022   • Left abducens nerve palsy 06/03/2022   • Myopia of both eyes 06/03/2022   • Acquired pancytopenia ([facility]) 06/03/2022   • Epistaxis, recurrent 06/02/2022   • Pre B-cell acute lymphoblastic leukemia (ALL) ([facility]) 05/29/2022   • Acute lymphoblastic leukemia in adult ([facility]) 05/27/2022   • PE (pulmonary thromboembolism) ([facility]) 05/27/2022   • Superficial vein thrombosis 05/27/2022   • Family history of diabetes mellitus 08/18/2020   • Callus of foot 08/18/2020   • Flat feet 04/03/2020   • Bilateral anterior knee pain 04/03/2020   • Bilateral ankle joint pain 04/03/2020   • Chronic midline thoracic back pain 04/03/2020         Plan:  Deep monitored sedation for LP with chemo and bone marrow aspiration    ASA Classification: III    Planned Sedation/Anesthesia Agent:  Propofol, Fentanyl    Airway Assessment:  an adequate airway, no risk factors, no craniofacial anomalies, no h/o difficult intubation    Mallampati score: I            Pre-sedation assessment:    I have reassessed the patient just prior to the  procedure and the patient remains an appropriate candidate to undergo the planned procedure and sedation:  Yes      Informed consent was discussed with parent and/or legal guardian including the risks, benefits, potential complications of the planned sedation.  Their questions have been answered and they have given informed consent:  Yes    Pre-sedation Assessment Time: spent for exam, and obtaining consent was: 15 minutes    Time out:  Done with family, patient and sedation RN        Post-sedation note:    Total Propofol dose: 260 mg  Total Fentanyl dose: 50 mcg    Post-sedation assessment:  Patient is stable postoperatively and has adequately recovered from anesthesia as described below unless otherwise noted. Patient is determined to have stable airway patency and respiratory function including respiratory rate and oxygen saturation. Patient has a stable heart rate, blood pressure, and adequate hydration. Patient's mental status is acceptable. Patient's temperature is appropriate. Pain and nausea are adequately controlled. Refer to nursing notes for full documentation of vital signs. RN at bedside to continue monitoring.    Temp: 97.8  Pain score: 0/10  BP: 102/63    Sedation Time Out/Start time: 1124  End LP: 11:46  Time out for bone marrow aspiration: 11:47    Sedation end time: 1157

## 2022-06-28 DIAGNOSIS — C91.00 PRE B-CELL ACUTE LYMPHOBLASTIC LEUKEMIA (ALL) (HCC): ICD-10-CM

## 2022-06-28 DIAGNOSIS — H53.2 DIPLOPIA: ICD-10-CM

## 2022-06-28 LAB — CYTOLOGY REG CYTOL: NORMAL

## 2022-06-29 LAB
ACUTE LEUKEMIA MARKERS SPEC-IMP: NORMAL
EVENTS COUNTED SPEC: 13 MARKERS

## 2022-06-29 RX ORDER — ONDANSETRON 2 MG/ML
8 INJECTION INTRAMUSCULAR; INTRAVENOUS ONCE
Status: CANCELLED | OUTPATIENT
Start: 2022-07-05

## 2022-06-29 RX ORDER — LIDOCAINE AND PRILOCAINE 25; 25 MG/G; MG/G
CREAM TOPICAL PRN
Status: CANCELLED | OUTPATIENT
Start: 2022-07-05

## 2022-07-01 RX ORDER — ONDANSETRON 2 MG/ML
8 INJECTION INTRAMUSCULAR; INTRAVENOUS EVERY 8 HOURS PRN
Status: CANCELLED | OUTPATIENT
Start: 2022-07-08

## 2022-07-01 RX ORDER — ONDANSETRON 2 MG/ML
8 INJECTION INTRAMUSCULAR; INTRAVENOUS ONCE
Status: CANCELLED | OUTPATIENT
Start: 2022-07-07

## 2022-07-01 RX ORDER — ONDANSETRON 2 MG/ML
8 INJECTION INTRAMUSCULAR; INTRAVENOUS ONCE
Status: CANCELLED | OUTPATIENT
Start: 2022-07-08

## 2022-07-01 RX ORDER — DEXTROSE MONOHYDRATE, SODIUM CHLORIDE, AND POTASSIUM CHLORIDE 50; 1.49; 4.5 G/1000ML; G/1000ML; G/1000ML
INJECTION, SOLUTION INTRAVENOUS CONTINUOUS
Status: CANCELLED | OUTPATIENT
Start: 2022-07-05

## 2022-07-01 RX ORDER — SODIUM CHLORIDE 9 MG/ML
500 INJECTION, SOLUTION INTRAVENOUS CONTINUOUS
Status: CANCELLED | OUTPATIENT
Start: 2022-07-07

## 2022-07-01 RX ORDER — LIDOCAINE AND PRILOCAINE 25; 25 MG/G; MG/G
CREAM TOPICAL PRN
Status: CANCELLED | OUTPATIENT
Start: 2022-07-07

## 2022-07-01 RX ORDER — ONDANSETRON 2 MG/ML
8 INJECTION INTRAMUSCULAR; INTRAVENOUS ONCE
Status: CANCELLED | OUTPATIENT
Start: 2022-07-06

## 2022-07-01 RX ORDER — LIDOCAINE AND PRILOCAINE 25; 25 MG/G; MG/G
CREAM TOPICAL PRN
Status: CANCELLED | OUTPATIENT
Start: 2022-07-08

## 2022-07-01 RX ORDER — LORAZEPAM 2 MG/ML
1 INJECTION INTRAMUSCULAR EVERY 6 HOURS PRN
Status: CANCELLED | OUTPATIENT
Start: 2022-07-07

## 2022-07-01 RX ORDER — LORAZEPAM 2 MG/ML
1 INJECTION INTRAMUSCULAR EVERY 6 HOURS PRN
Status: CANCELLED | OUTPATIENT
Start: 2022-07-08

## 2022-07-01 RX ORDER — SODIUM CHLORIDE 9 MG/ML
500 INJECTION, SOLUTION INTRAVENOUS CONTINUOUS
Status: CANCELLED | OUTPATIENT
Start: 2022-07-05

## 2022-07-01 RX ORDER — ONDANSETRON 2 MG/ML
8 INJECTION INTRAMUSCULAR; INTRAVENOUS EVERY 8 HOURS PRN
Status: CANCELLED | OUTPATIENT
Start: 2022-07-07

## 2022-07-01 RX ORDER — LIDOCAINE AND PRILOCAINE 25; 25 MG/G; MG/G
CREAM TOPICAL PRN
Status: CANCELLED | OUTPATIENT
Start: 2022-07-06

## 2022-07-01 RX ORDER — SODIUM CHLORIDE 9 MG/ML
500 INJECTION, SOLUTION INTRAVENOUS CONTINUOUS
Status: CANCELLED | OUTPATIENT
Start: 2022-07-08

## 2022-07-01 RX ORDER — LORAZEPAM 2 MG/ML
1 INJECTION INTRAMUSCULAR EVERY 6 HOURS PRN
Status: CANCELLED | OUTPATIENT
Start: 2022-07-05

## 2022-07-01 RX ORDER — LORAZEPAM 2 MG/ML
1 INJECTION INTRAMUSCULAR EVERY 6 HOURS PRN
Status: CANCELLED | OUTPATIENT
Start: 2022-07-06

## 2022-07-01 RX ORDER — ONDANSETRON 2 MG/ML
8 INJECTION INTRAMUSCULAR; INTRAVENOUS EVERY 8 HOURS PRN
Status: CANCELLED | OUTPATIENT
Start: 2022-07-06

## 2022-07-01 RX ORDER — ONDANSETRON 2 MG/ML
8 INJECTION INTRAMUSCULAR; INTRAVENOUS EVERY 8 HOURS PRN
Status: CANCELLED | OUTPATIENT
Start: 2022-07-05

## 2022-07-01 RX ORDER — SODIUM CHLORIDE 9 MG/ML
500 INJECTION, SOLUTION INTRAVENOUS CONTINUOUS
Status: CANCELLED | OUTPATIENT
Start: 2022-07-06

## 2022-07-01 NOTE — PROGRESS NOTES
"Pharmacy Chemotherapy Calculations Note:    Dx: B-ALL, PH+    Cycle: Induction IB  Previous treatment: Induction 1A Part 2 Day 29 on 6/27/22     Protocol: Induction 1B per [id number] (on [id number] phase 3 trial starting Induction 1A Part 2 D15)             /57   Pulse (!) 114   Temp 36.8 °C (98.3 °F) (Temporal)   Resp 18   Ht 1.675 m (5' 5.95\")   Wt 64 kg (141 lb 1.5 oz)   SpO2 99%   BMI 22.81 kg/m²  Body surface area is 1.73 meters squared.  Tx Plan Values for induction starting 7/5/22:   Ht 167.5 cm Wt 64 kg BSA 1.73 m²        Labs 7/5/22:  ANC~ 3140 Plt = 254k   Hgb = 7.1     SCr = 0.67 mg/dL CrCl > 125 mL/min (min Scr 0.7 used)  AST/ALT/AP = 43/120/82 TBili = 0.2  DBili < 0.2      Urine SpG = 1.003      Cyclophosphamide 1000 mg/m² x 1.73 m² = 1730 mg   < 10 % difference, ok to treat with final dose  = 1730 mg IV on Day 1    **DO not start until after at least 2 hours of PRE-hydration AND urine SpG < 1.010**    Cytarabine 75 mg/m² x 1.73 m² = 129.8 mg   <10% difference, okay to treat with final dose = 130 mg IV on Days 1 to 4     Marifer Torres, PharmD, BCOP                "

## 2022-07-05 ENCOUNTER — DOCUMENTATION (OUTPATIENT)
Dept: HEALTH INFORMATION MANAGEMENT | Facility: OTHER | Age: 22
End: 2022-07-05
Payer: COMMERCIAL

## 2022-07-05 ENCOUNTER — HOSPITAL ENCOUNTER (OUTPATIENT)
Dept: INFUSION CENTER | Facility: MEDICAL CENTER | Age: 22
End: 2022-07-05
Attending: PEDIATRICS
Payer: COMMERCIAL

## 2022-07-05 ENCOUNTER — PHARMACY VISIT (OUTPATIENT)
Dept: PHARMACY | Facility: MEDICAL CENTER | Age: 22
End: 2022-07-05
Payer: COMMERCIAL

## 2022-07-05 VITALS
DIASTOLIC BLOOD PRESSURE: 61 MMHG | RESPIRATION RATE: 18 BRPM | WEIGHT: 141.09 LBS | BODY MASS INDEX: 22.68 KG/M2 | HEART RATE: 107 BPM | TEMPERATURE: 98.9 F | SYSTOLIC BLOOD PRESSURE: 108 MMHG | HEIGHT: 66 IN | OXYGEN SATURATION: 98 %

## 2022-07-05 DIAGNOSIS — Z51.11 ENCOUNTER FOR ANTINEOPLASTIC CHEMOTHERAPY: ICD-10-CM

## 2022-07-05 DIAGNOSIS — C91.Z0 B LYMPHOBLASTIC LEUKEMIA WITH T(9;22)(Q34;Q11.2);BCR-ABL1 (HCC): ICD-10-CM

## 2022-07-05 DIAGNOSIS — C91.00 PRE B-CELL ACUTE LYMPHOBLASTIC LEUKEMIA (ALL) (HCC): ICD-10-CM

## 2022-07-05 DIAGNOSIS — C91.00 PHILADELPHIA CHROMOSOME POSITIVE ACUTE LYMPHOBLASTIC LEUKEMIA (HCC): ICD-10-CM

## 2022-07-05 DIAGNOSIS — I26.99 PE (PULMONARY THROMBOEMBOLISM) (HCC): ICD-10-CM

## 2022-07-05 DIAGNOSIS — T45.1X5A ANEMIA DUE TO ANTINEOPLASTIC CHEMOTHERAPY: ICD-10-CM

## 2022-07-05 DIAGNOSIS — D64.81 ANEMIA DUE TO ANTINEOPLASTIC CHEMOTHERAPY: ICD-10-CM

## 2022-07-05 LAB
ABO GROUP BLD: NORMAL
ALBUMIN SERPL BCP-MCNC: 3.1 G/DL (ref 3.2–4.9)
ALBUMIN/GLOB SERPL: 1.3 G/DL
ALP SERPL-CCNC: 82 U/L (ref 30–99)
ALT SERPL-CCNC: 120 U/L (ref 2–50)
ANION GAP SERPL CALC-SCNC: 10 MMOL/L (ref 7–16)
ANISOCYTOSIS BLD QL SMEAR: ABNORMAL
APPEARANCE UR: ABNORMAL
APPEARANCE UR: CLEAR
AST SERPL-CCNC: 43 U/L (ref 12–45)
BACTERIA #/AREA URNS HPF: NEGATIVE /HPF
BARCODED ABORH UBTYP: 5100
BARCODED PRD CODE UBPRD: NORMAL
BARCODED UNIT NUM UBUNT: NORMAL
BASOPHILS # BLD AUTO: 0.9 % (ref 0–1.8)
BASOPHILS # BLD: 0.04 K/UL (ref 0–0.12)
BILIRUB CONJ SERPL-MCNC: <0.2 MG/DL (ref 0.1–0.5)
BILIRUB INDIRECT SERPL-MCNC: NORMAL MG/DL (ref 0–1)
BILIRUB SERPL-MCNC: 0.2 MG/DL (ref 0.1–1.5)
BILIRUB UR QL STRIP.AUTO: NEGATIVE
BILIRUB UR QL STRIP.AUTO: NEGATIVE
BLD GP AB SCN SERPL QL: NORMAL
BUN SERPL-MCNC: 9 MG/DL (ref 8–22)
CALCIUM SERPL-MCNC: 8.1 MG/DL (ref 8.5–10.5)
CAOX CRY #/AREA URNS HPF: ABNORMAL /HPF
CHLORIDE SERPL-SCNC: 103 MMOL/L (ref 96–112)
CO2 SERPL-SCNC: 21 MMOL/L (ref 20–33)
COLOR UR: ABNORMAL
COLOR UR: YELLOW
COMPONENT R 8504R: NORMAL
CREAT SERPL-MCNC: 0.67 MG/DL (ref 0.5–1.4)
EOSINOPHIL # BLD AUTO: 0 K/UL (ref 0–0.51)
EOSINOPHIL NFR BLD: 0 % (ref 0–6.9)
EPI CELLS #/AREA URNS HPF: NEGATIVE /HPF
ERYTHROCYTE [DISTWIDTH] IN BLOOD BY AUTOMATED COUNT: 56.9 FL (ref 35.9–50)
GFR SERPLBLD CREATININE-BSD FMLA CKD-EPI: 136 ML/MIN/1.73 M 2
GLOBULIN SER CALC-MCNC: 2.3 G/DL (ref 1.9–3.5)
GLUCOSE SERPL-MCNC: 121 MG/DL (ref 65–99)
GLUCOSE UR STRIP.AUTO-MCNC: NEGATIVE MG/DL
GLUCOSE UR STRIP.AUTO-MCNC: NEGATIVE MG/DL
HCT VFR BLD AUTO: 23 % (ref 42–52)
HGB BLD-MCNC: 7.1 G/DL (ref 14–18)
HYALINE CASTS #/AREA URNS LPF: ABNORMAL /LPF
KETONES UR STRIP.AUTO-MCNC: NEGATIVE MG/DL
KETONES UR STRIP.AUTO-MCNC: NEGATIVE MG/DL
LEUKOCYTE ESTERASE UR QL STRIP.AUTO: NEGATIVE
LEUKOCYTE ESTERASE UR QL STRIP.AUTO: NEGATIVE
LYMPHOCYTES # BLD AUTO: 0.58 K/UL (ref 1–4.8)
LYMPHOCYTES NFR BLD: 13.5 % (ref 22–41)
MACROCYTES BLD QL SMEAR: ABNORMAL
MANUAL DIFF BLD: NORMAL
MCH RBC QN AUTO: 31.8 PG (ref 27–33)
MCHC RBC AUTO-ENTMCNC: 30.9 G/DL (ref 33.7–35.3)
MCV RBC AUTO: 103.1 FL (ref 81.4–97.8)
METAMYELOCYTES NFR BLD MANUAL: 2.7 %
MICRO URNS: ABNORMAL
MICRO URNS: NORMAL
MICROCYTES BLD QL SMEAR: ABNORMAL
MONOCYTES # BLD AUTO: 0.27 K/UL (ref 0–0.85)
MONOCYTES NFR BLD AUTO: 6.3 % (ref 0–13.4)
MORPHOLOGY BLD-IMP: NORMAL
MUCOUS THREADS #/AREA URNS HPF: ABNORMAL /HPF
MYELOCYTES NFR BLD MANUAL: 3.6 %
NEUTROPHILS # BLD AUTO: 3.14 K/UL (ref 1.82–7.42)
NEUTROPHILS NFR BLD: 72.1 % (ref 44–72)
NEUTS BAND NFR BLD MANUAL: 0.9 % (ref 0–10)
NITRITE UR QL STRIP.AUTO: NEGATIVE
NITRITE UR QL STRIP.AUTO: NEGATIVE
NRBC # BLD AUTO: 0.08 K/UL
NRBC BLD-RTO: 1.8 /100 WBC
PH UR STRIP.AUTO: 5.5 [PH] (ref 5–8)
PH UR STRIP.AUTO: 6.5 [PH] (ref 5–8)
PLATELET # BLD AUTO: 254 K/UL (ref 164–446)
PLATELET BLD QL SMEAR: NORMAL
PMV BLD AUTO: 9.4 FL (ref 9–12.9)
POLYCHROMASIA BLD QL SMEAR: NORMAL
POTASSIUM SERPL-SCNC: 3.6 MMOL/L (ref 3.6–5.5)
PRODUCT TYPE UPROD: NORMAL
PROT SERPL-MCNC: 5.4 G/DL (ref 6–8.2)
PROT UR QL STRIP: NEGATIVE MG/DL
PROT UR QL STRIP: NEGATIVE MG/DL
RBC # BLD AUTO: 2.23 M/UL (ref 4.7–6.1)
RBC # URNS HPF: ABNORMAL /HPF
RBC BLD AUTO: PRESENT
RBC UR QL AUTO: ABNORMAL
RBC UR QL AUTO: NEGATIVE
RH BLD: NORMAL
SODIUM SERPL-SCNC: 134 MMOL/L (ref 135–145)
SP GR UR STRIP.AUTO: 1
SP GR UR STRIP.AUTO: 1.02
UNIT STATUS USTAT: NORMAL
UROBILINOGEN UR STRIP.AUTO-MCNC: 0.2 MG/DL
UROBILINOGEN UR STRIP.AUTO-MCNC: 0.2 MG/DL
WBC # BLD AUTO: 4.3 K/UL (ref 4.8–10.8)
WBC #/AREA URNS HPF: ABNORMAL /HPF

## 2022-07-05 PROCEDURE — 99211 OFF/OP EST MAY X REQ PHY/QHP: CPT

## 2022-07-05 PROCEDURE — 700105 HCHG RX REV CODE 258: Performed by: PEDIATRICS

## 2022-07-05 PROCEDURE — 86900 BLOOD TYPING SEROLOGIC ABO: CPT

## 2022-07-05 PROCEDURE — 700102 HCHG RX REV CODE 250 W/ 637 OVERRIDE(OP): Performed by: PEDIATRICS

## 2022-07-05 PROCEDURE — 80053 COMPREHEN METABOLIC PANEL: CPT

## 2022-07-05 PROCEDURE — 99215 OFFICE O/P EST HI 40 MIN: CPT | Performed by: PEDIATRICS

## 2022-07-05 PROCEDURE — 306780 HCHG STAT FOR TRANSFUSION PER CASE

## 2022-07-05 PROCEDURE — 96413 CHEMO IV INFUSION 1 HR: CPT

## 2022-07-05 PROCEDURE — 700111 HCHG RX REV CODE 636 W/ 250 OVERRIDE (IP): Mod: JW | Performed by: PEDIATRICS

## 2022-07-05 PROCEDURE — 86923 COMPATIBILITY TEST ELECTRIC: CPT

## 2022-07-05 PROCEDURE — 96361 HYDRATE IV INFUSION ADD-ON: CPT

## 2022-07-05 PROCEDURE — P9016 RBC LEUKOCYTES REDUCED: HCPCS

## 2022-07-05 PROCEDURE — 86901 BLOOD TYPING SEROLOGIC RH(D): CPT

## 2022-07-05 PROCEDURE — 36592 COLLECT BLOOD FROM PICC: CPT

## 2022-07-05 PROCEDURE — 96375 TX/PRO/DX INJ NEW DRUG ADDON: CPT

## 2022-07-05 PROCEDURE — 81003 URINALYSIS AUTO W/O SCOPE: CPT

## 2022-07-05 PROCEDURE — A9270 NON-COVERED ITEM OR SERVICE: HCPCS | Performed by: PEDIATRICS

## 2022-07-05 PROCEDURE — 82248 BILIRUBIN DIRECT: CPT

## 2022-07-05 PROCEDURE — 81001 URINALYSIS AUTO W/SCOPE: CPT

## 2022-07-05 PROCEDURE — 85007 BL SMEAR W/DIFF WBC COUNT: CPT

## 2022-07-05 PROCEDURE — RXMED WILLOW AMBULATORY MEDICATION CHARGE: Performed by: PEDIATRICS

## 2022-07-05 PROCEDURE — 96411 CHEMO IV PUSH ADDL DRUG: CPT

## 2022-07-05 PROCEDURE — 86945 BLOOD PRODUCT/IRRADIATION: CPT

## 2022-07-05 PROCEDURE — 700101 HCHG RX REV CODE 250: Performed by: PEDIATRICS

## 2022-07-05 PROCEDURE — 85025 COMPLETE CBC W/AUTO DIFF WBC: CPT

## 2022-07-05 PROCEDURE — 36430 TRANSFUSION BLD/BLD COMPNT: CPT

## 2022-07-05 PROCEDURE — 86850 RBC ANTIBODY SCREEN: CPT

## 2022-07-05 RX ORDER — ONDANSETRON 2 MG/ML
8 INJECTION INTRAMUSCULAR; INTRAVENOUS EVERY 8 HOURS PRN
Status: CANCELLED | OUTPATIENT
Start: 2022-07-08

## 2022-07-05 RX ORDER — DIPHENHYDRAMINE HYDROCHLORIDE 50 MG/ML
25 INJECTION INTRAMUSCULAR; INTRAVENOUS PRN
Status: DISCONTINUED | OUTPATIENT
Start: 2022-07-05 | End: 2022-07-06 | Stop reason: HOSPADM

## 2022-07-05 RX ORDER — ONDANSETRON 2 MG/ML
8 INJECTION INTRAMUSCULAR; INTRAVENOUS ONCE
Status: CANCELLED | OUTPATIENT
Start: 2022-07-06

## 2022-07-05 RX ORDER — ONDANSETRON 2 MG/ML
8 INJECTION INTRAMUSCULAR; INTRAVENOUS EVERY 8 HOURS PRN
Status: CANCELLED | OUTPATIENT
Start: 2022-07-07

## 2022-07-05 RX ORDER — MERCAPTOPURINE 50 MG/1
TABLET ORAL
Qty: 58 TABLET | Refills: 0 | Status: ON HOLD | OUTPATIENT
Start: 2022-07-05 | End: 2022-08-29

## 2022-07-05 RX ORDER — SODIUM CHLORIDE 9 MG/ML
20 INJECTION, SOLUTION INTRAVENOUS PRN
Status: DISCONTINUED | OUTPATIENT
Start: 2022-07-05 | End: 2022-07-06 | Stop reason: HOSPADM

## 2022-07-05 RX ORDER — DEXTROSE MONOHYDRATE, SODIUM CHLORIDE, AND POTASSIUM CHLORIDE 50; 1.49; 4.5 G/1000ML; G/1000ML; G/1000ML
INJECTION, SOLUTION INTRAVENOUS CONTINUOUS
Status: DISCONTINUED | OUTPATIENT
Start: 2022-07-05 | End: 2022-07-06 | Stop reason: HOSPADM

## 2022-07-05 RX ORDER — ONDANSETRON 2 MG/ML
8 INJECTION INTRAMUSCULAR; INTRAVENOUS EVERY 8 HOURS PRN
Status: CANCELLED | OUTPATIENT
Start: 2022-07-06

## 2022-07-05 RX ORDER — ONDANSETRON 2 MG/ML
8 INJECTION INTRAMUSCULAR; INTRAVENOUS ONCE
Status: CANCELLED | OUTPATIENT
Start: 2022-07-07

## 2022-07-05 RX ORDER — ONDANSETRON 2 MG/ML
8 INJECTION INTRAMUSCULAR; INTRAVENOUS ONCE
Status: COMPLETED | OUTPATIENT
Start: 2022-07-05 | End: 2022-07-05

## 2022-07-05 RX ORDER — ONDANSETRON 2 MG/ML
8 INJECTION INTRAMUSCULAR; INTRAVENOUS ONCE
Status: CANCELLED | OUTPATIENT
Start: 2022-07-08

## 2022-07-05 RX ORDER — DIPHENHYDRAMINE HYDROCHLORIDE 50 MG/ML
25 INJECTION INTRAMUSCULAR; INTRAVENOUS PRN
Status: CANCELLED | OUTPATIENT
Start: 2022-07-05

## 2022-07-05 RX ORDER — ACETAMINOPHEN 325 MG/1
650 TABLET ORAL ONCE
Status: COMPLETED | OUTPATIENT
Start: 2022-07-05 | End: 2022-07-05

## 2022-07-05 RX ORDER — DEXTROSE MONOHYDRATE, SODIUM CHLORIDE, AND POTASSIUM CHLORIDE 50; 1.49; 4.5 G/1000ML; G/1000ML; G/1000ML
INJECTION, SOLUTION INTRAVENOUS CONTINUOUS
Status: DISCONTINUED | OUTPATIENT
Start: 2022-07-05 | End: 2022-07-05

## 2022-07-05 RX ORDER — ONDANSETRON 2 MG/ML
8 INJECTION INTRAMUSCULAR; INTRAVENOUS EVERY 8 HOURS PRN
Status: CANCELLED | OUTPATIENT
Start: 2022-07-05

## 2022-07-05 RX ADMIN — ONDANSETRON 8 MG: 2 INJECTION INTRAMUSCULAR; INTRAVENOUS at 10:28

## 2022-07-05 RX ADMIN — CYCLOPHOSPHAMIDE 1730 MG: 2 INJECTION, POWDER, FOR SOLUTION INTRAVENOUS; ORAL at 10:35

## 2022-07-05 RX ADMIN — POTASSIUM CHLORIDE, DEXTROSE MONOHYDRATE AND SODIUM CHLORIDE: 150; 5; 450 INJECTION, SOLUTION INTRAVENOUS at 11:25

## 2022-07-05 RX ADMIN — SODIUM CHLORIDE 1280 ML: 9 INJECTION, SOLUTION INTRAVENOUS at 08:55

## 2022-07-05 RX ADMIN — CYTARABINE 130 MG: 20 INJECTION, SOLUTION INTRATHECAL; INTRAVENOUS; SUBCUTANEOUS at 11:15

## 2022-07-05 RX ADMIN — POTASSIUM CHLORIDE, DEXTROSE MONOHYDRATE AND SODIUM CHLORIDE: 150; 5; 450 INJECTION, SOLUTION INTRAVENOUS at 07:52

## 2022-07-05 RX ADMIN — ACETAMINOPHEN 650 MG: 325 TABLET ORAL at 11:23

## 2022-07-05 ASSESSMENT — FIBROSIS 4 INDEX: FIB4 SCORE: 2.53

## 2022-07-05 NOTE — PROGRESS NOTES
"Pharmacy Chemotherapy Calculation    Dx: ALL         Protocol: [id number] Induction IB   *Dosing Reference*      Allergies:  Amoxicillin       /71   Pulse (!) 126   Temp 36.7 °C (98 °F) (Temporal)   Resp 20   Ht 1.675 m (5' 5.95\")   Wt 64 kg (141 lb 1.5 oz)   SpO2 98%   BMI 22.81 kg/m²  Body surface area is 1.73 meters squared.    Meets ANC and plt parameters according to /roadmap     Drug Order   (Drug name, dose, route, IV Fluid & volume, frequency, number of doses) Cycle: Induction IB; Day 1      Previous treatment: Induction day 22 on 7/6/20/2022 and IT dose given 6/27/2022      Medication = cyclophosphamide  Base Dose= 1000 mg/m2  Calc Dose: Base Dose x 1.73 m2 = 1730 mg  Final Dose = 1730 mg  Route = IV  Fluid & Volume =  mL  Admin Duration = Over 30 min    Do not administer until urine spec gravity is less than or equal to 1.010      <10% difference, OK to treat with final dose   Medication = cytarabine  Base Dose= 75 mg/m2  Calc Dose: Base Dose x 1.73 m2 = 129.75 mg  Final Dose = 130 mg  Route = IV  Fluid & Volume = in syringe; 6.5 mL  Admin Duration = Over 5 min          <10% difference, OK to treat with final dose     By my signature below, I confirm this process was performed independently with the BSA and all final chemotherapy dosing calculations congruent. I have reviewed the above chemotherapy order and that my calculation of the final dose and BSA (when applicable) corroborate those calculations of the  pharmacist. Discrepancies of 5% or greater in the written dose have been addressed and documented within the [facility] Progress notes.    Signature: Cherrie Decker, PharmD, BCOP, BCPS      "

## 2022-07-05 NOTE — PROGRESS NOTES
Meds-to-Beds: Discharge prescription orders listed below delivered to patient's bedside. MONTSERRAT Payton notified. Written information regarding the dispensed prescription was provided to the patient including the phone number of the pharmacy to call for any questions.    Current Outpatient Medications   Medication Sig Dispense Refill   • mercaptopurine (PURINETHOL) 50 MG Tab Take 2 tablets (100 mg) daily Tue - Sun and 2.5 tablets (125 mg) on Mondays only.  Continue for 28 days total. 58 Tablet 0      Ashley Pressley, PharmD

## 2022-07-05 NOTE — PROGRESS NOTES
"Pediatric Hematology / Oncology  Progress Note      Patient Name:  Tomas Jean-Baptiste  : 2000   MRN: [id number]    Location of Service:  [facility]  Date of Service: 2022  Time: 9:55 AM    Primary Care Physician: Margarito Arvizu M.D.    Protocol/Treatment Plan: [id number]    HISTORY OF PRESENT ILLNESS:     Chief Complaint: Here for chemotherapy; starting Induction 1B.    History of Present Illness: Tomas Jean-Baptiste is a 21 y.o. male who presents to the [facility] for scheduled chemotherapy.  Today is Day 1 of Induction 1B treatment as per protocol [id number] for [place] chromosome-positive Acute B-Lymphoblastic Leukemia.     JULY's marrow last week showed no flow cytometric evidence of residual leukemia (MRD-negative) following 4 weeks of induction treatment that included oral imatinib.  His blood counts today (see below) have improved substantially since last week and are more than adequate for continuing treatment, per protocol.    JULY reports fatigue (but getting better) and mild headache.  Otherwise, he is doing well.  He is obviously very pleased about the good marrow result from last week.    Review of Systems:     Constitutional: Afebrile. Good appetite.  HENT: Negative for URI symptoms.  Respiratory: Negative for shortness of breath or cough.     Gastrointestinal: Negative for nausea, vomiting, abdominal pain, diarrhea, constipation and blood in stool.      Musculoskeletal: \"Weak.\"    Skin: Negative for rash, signs of infection.  Neurological: Mild headaches.    Endo/Heme/Allergies: Does not bruise/bleed easily.    Psychiatric/Behavioral: Happy about MRD-negative marrow result.    PAST MEDICAL HISTORY:       Allergies:   Allergies as of 2022 - Reviewed 2022   Allergen Reaction Noted   • Amoxicillin Rash 2020       Home Medications:    Current Outpatient Medications "   Medication Sig Dispense Refill   • mercaptopurine (PURINETHOL) 50 MG Tab Take 2 tablets (100 mg) daily Tue - Sun and 2.5 tablets (125 mg) on Mondays only.  Continue for 28 days total. 58 Tablet 0   • apixaban (ELIQUIS) 5mg Tab Take 1 Tablet by mouth 2 times a day. Indications: DVT/PE 28 Tablet 2   • sulfamethoxazole-trimethoprim (BACTRIM) 400-80 MG Tab Take 2 tablets by mouth twice daily every Saturday and Sunday 40 Tablet 11   • OLANZapine (ZYPREXA) 5 MG Tab Take 1 Tablet by mouth every evening. 30 Tablet 0   • ondansetron (ZOFRAN ODT) 8 MG TABLET DISPERSIBLE Dissovle 1 Tablet by mouth every 8 hours as needed for Nausea. (Patient not taking: Reported on 7/6/2022) 20 Tablet 0   • senna-docusate (PERICOLACE OR SENOKOT S) 8.6-50 MG Tab Take 2 Tablets by mouth every day. (Patient not taking: Reported on 7/6/2022) 40 Tablet 1   • polyethylene glycol/lytes (MIRALAX) 17 g Pack Mix 1 Packet per package instructions and drink by mouth 1 time a day as needed (if sennosides and docusate ineffective after 24 hours). 10 Each 3   • ascorbic acid (ASCORBIC ACID) 500 MG Tab Take 500 mg by mouth every day.     • therapeutic multivitamin-minerals (THERAGRAN-M) Tab Take 1 Tab by mouth every day.     • vitamin D3 (CHOLECALCIFEROL) 1000 Unit (25 mcg) Tab Take 1,000 Units by mouth every day.     • Sodium Chloride Flush (NORMAL SALINE FLUSH) 0.9 % Solution Infuse 10 mL into a venous catheter every 12 hours. 600 mL 0   • imatinib (GLEEVEC) 400 MG tablet Take 1.5 Tablets by mouth every day for 30 days. 45 Tablet 5   • famotidine (PEPCID) 20 MG Tab Take 1 Tablet by mouth 2 times a day. (Patient not taking: No sig reported) 60 Tablet 1   • ammonium lactate (LAC-HYDRIN) 12 % Lotion Apply to peeling areas of feet twice daily as needed (Patient not taking: No sig reported) 500 g 0     No current facility-administered medications for this encounter.        Social History: Living with mother.  Summer internship canceled.        OBJECTIVE:  "    Vitals:   /61   Pulse (!) 107   Temp 37.2 °C (98.9 °F) (Temporal)   Resp 18   Ht 1.675 m (5' 5.95\")   Wt 64 kg (141 lb 1.5 oz)   SpO2 98%     Labs:    Hospital Outpatient Visit on 07/05/2022   Component Date Value   • WBC 07/05/2022 4.3 (A)   • RBC 07/05/2022 2.23 (A)   • Hemoglobin 07/05/2022 7.1 (A)   • Hematocrit 07/05/2022 23.0 (A)   • MCV 07/05/2022 103.1 (A)   • MCH 07/05/2022 31.8    • MCHC 07/05/2022 30.9 (A)   • RDW 07/05/2022 56.9 (A)   • Platelet Count 07/05/2022 254    • MPV 07/05/2022 9.4    • Neutrophils-Polys 07/05/2022 72.10 (A)   • Lymphocytes 07/05/2022 13.50 (A)   • Monocytes 07/05/2022 6.30    • Eosinophils 07/05/2022 0.00    • Basophils 07/05/2022 0.90    • Nucleated RBC 07/05/2022 1.80    • Neutrophils (Absolute) 07/05/2022 3.14    • Lymphs (Absolute) 07/05/2022 0.58 (A)   • Monos (Absolute) 07/05/2022 0.27    • Eos (Absolute) 07/05/2022 0.00    • Baso (Absolute) 07/05/2022 0.04    • NRBC (Absolute) 07/05/2022 0.08    • Anisocytosis 07/05/2022 2+ (A)   • Macrocytosis 07/05/2022 2+ (A)   • Microcytosis 07/05/2022 1+    • Sodium 07/05/2022 134 (A)   • Potassium 07/05/2022 3.6    • Chloride 07/05/2022 103    • Co2 07/05/2022 21    • Anion Gap 07/05/2022 10.0    • Glucose 07/05/2022 121 (A)   • Bun 07/05/2022 9    • Creatinine 07/05/2022 0.67    • Calcium 07/05/2022 8.1 (A)   • AST(SGOT) 07/05/2022 43    • ALT(SGPT) 07/05/2022 120 (A)   • Alkaline Phosphatase 07/05/2022 82    • Total Bilirubin 07/05/2022 0.2    • Albumin 07/05/2022 3.1 (A)   • Total Protein 07/05/2022 5.4 (A)   • Globulin 07/05/2022 2.3    • A-G Ratio 07/05/2022 1.3    • Direct Bilirubin 07/05/2022 <0.2    • Bands-Stabs 07/05/2022 0.90    • Metamyelocytes 07/05/2022 2.70    • Myelocytes 07/05/2022 3.60    • Color 07/05/2022 Yellow    • Character 07/05/2022 Clear    • Specific Gravity 07/05/2022 1.003    • Ph 07/05/2022 5.5    • Glucose 07/05/2022 Negative    • Ketones 07/05/2022 Negative    • Protein 07/05/2022 " Negative    • Bilirubin 07/05/2022 Negative    • Urobilinogen, Urine 07/05/2022 0.2    • Nitrite 07/05/2022 Negative    • Leukocyte Esterase 07/05/2022 Negative    • Occult Blood 07/05/2022 Negative    • Micro Urine Req 07/05/2022 see below        Physical Exam:    Constitutional: Well-developed, well-nourished, and in no distress. Pale, fatigued.   HENT: Normocephalic and atraumatic. No nasal congestion or rhinorrhea. Oropharynx is clear and moist.    Eyes: Conjunctivae are normal. Pupils are equal, round, and reactive to light. No scleral icterus.    Neck: Normal range of motion of neck, no adenopathy.    Cardiovascular: Normal rate, regular rhythm and normal heart sounds.  No murmur heard. Distal cap refill < 2 sec  Pulmonary/Chest: Effort normal and breath sounds normal.  Symmetric expansion.    Abdomen: Soft. Bowel sounds are normal. No distension and no mass. There is no hepatosplenomegaly.    Skin: Skin is warm, dry and pink.  No rash or evidence of skin infection.  Psychiatric: Mood and affect normal for age.      ASSESSMENT AND PLAN:     Problem List Items Addressed This Visit     PE (pulmonary thromboembolism) (HCC)     Refill Rx provided for apixaban.    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 JULY's neutrophils and platelet count have recovered nicely over the last week.  Overall, he tolerated his first month of treatment well.    Relevant Orders    CBC WITH DIFFERENTIAL (Completed)    CMP (Completed)    BILIRUBIN DIRECT (Completed)    URINALYSIS    Encounter for antineoplastic chemotherapy     We will now move on to induction 1B on protocol [id number].  Today's treatment includes IV cyclophosphamide (with aggressive pre and post hydration) and IV cytarabine (day 1 of four).     JULY will also begin a 28-day  "course of oral mercaptopurine.  The medication was delivered by our pharmacy today and I reviewed his treatment calendar with JULY in detail.      Relevant Orders    CBC WITH DIFFERENTIAL (Completed)    CMP (Completed)    BILIRUBIN DIRECT (Completed)    URINALYSIS    Other specified anemias  Shortly after arrival, JULY began to complain of worsening headache.  I elected to transfuse packed red blood cells during the \"post hydration\" phase of today's treatment.  He will likely require additional transfusions over the next few weeks.        JULY will RTC tomorrow for day 2 of cytarabine.    ANN MARIE Rodriguez MD  Pediatric Hematology / Oncology  [facility]  Cell.  [phone]  Office. [phone]          "

## 2022-07-06 ENCOUNTER — HOSPITAL ENCOUNTER (OUTPATIENT)
Dept: INFUSION CENTER | Facility: MEDICAL CENTER | Age: 22
End: 2022-07-06
Attending: PEDIATRICS
Payer: COMMERCIAL

## 2022-07-06 VITALS
RESPIRATION RATE: 20 BRPM | HEART RATE: 107 BPM | OXYGEN SATURATION: 100 % | DIASTOLIC BLOOD PRESSURE: 73 MMHG | TEMPERATURE: 99 F | HEIGHT: 66 IN | SYSTOLIC BLOOD PRESSURE: 120 MMHG | WEIGHT: 146.16 LBS | BODY MASS INDEX: 23.49 KG/M2

## 2022-07-06 DIAGNOSIS — C91.Z0 B LYMPHOBLASTIC LEUKEMIA WITH T(9;22)(Q34;Q11.2);BCR-ABL1 (HCC): ICD-10-CM

## 2022-07-06 DIAGNOSIS — C91.00 PHILADELPHIA CHROMOSOME POSITIVE ACUTE LYMPHOBLASTIC LEUKEMIA (HCC): ICD-10-CM

## 2022-07-06 DIAGNOSIS — Z51.11 ENCOUNTER FOR ANTINEOPLASTIC CHEMOTHERAPY: ICD-10-CM

## 2022-07-06 PROCEDURE — 96409 CHEMO IV PUSH SNGL DRUG: CPT

## 2022-07-06 PROCEDURE — 99213 OFFICE O/P EST LOW 20 MIN: CPT | Performed by: PEDIATRICS

## 2022-07-06 PROCEDURE — 96375 TX/PRO/DX INJ NEW DRUG ADDON: CPT

## 2022-07-06 PROCEDURE — 700111 HCHG RX REV CODE 636 W/ 250 OVERRIDE (IP): Performed by: PEDIATRICS

## 2022-07-06 RX ORDER — ONDANSETRON 2 MG/ML
8 INJECTION INTRAMUSCULAR; INTRAVENOUS EVERY 8 HOURS PRN
Status: DISCONTINUED | OUTPATIENT
Start: 2022-07-06 | End: 2022-07-07 | Stop reason: HOSPADM

## 2022-07-06 RX ORDER — LORAZEPAM 2 MG/ML
1 INJECTION INTRAMUSCULAR EVERY 6 HOURS PRN
Status: DISCONTINUED | OUTPATIENT
Start: 2022-07-06 | End: 2022-07-07 | Stop reason: HOSPADM

## 2022-07-06 RX ORDER — ONDANSETRON 2 MG/ML
8 INJECTION INTRAMUSCULAR; INTRAVENOUS ONCE
Status: COMPLETED | OUTPATIENT
Start: 2022-07-06 | End: 2022-07-06

## 2022-07-06 RX ORDER — VITAMIN B COMPLEX
1000 TABLET ORAL DAILY
COMMUNITY
End: 2023-07-05

## 2022-07-06 RX ADMIN — ONDANSETRON HYDROCHLORIDE 8 MG: 2 SOLUTION INTRAMUSCULAR; INTRAVENOUS at 09:58

## 2022-07-06 RX ADMIN — CYTARABINE 130 MG: 20 INJECTION, SOLUTION INTRATHECAL; INTRAVENOUS; SUBCUTANEOUS at 10:00

## 2022-07-06 ASSESSMENT — FIBROSIS 4 INDEX: FIB4 SCORE: 0.31

## 2022-07-06 NOTE — PROGRESS NOTES
"Pharmacy Chemotherapy Calculation    Dx: ALL         Protocol: [id number] Induction IB   *Dosing Reference*      Allergies:  Amoxicillin     Pulse (!) 110   Temp 36.7 °C (98.1 °F) (Temporal)   Resp 20   Ht 1.674 m (5' 5.91\")   Wt 66.3 kg (146 lb 2.6 oz)   BMI 23.66 kg/m²  Body surface area is 1.76 meters squared.    Meets ANC and plt parameters according to TP/roadmap.     Drug Order   (Drug name, dose, route, IV Fluid & volume, frequency, number of doses) Cycle: Induction IB; Day 2 of 4   Previous treatment: Induction day 22 on 7/6/20/2022 and IT dose given 6/27/2022    Medication = cyclophosphamide  Base Dose = 1000 mg/m2  Calc Dose: Base Dose x 1.73 m2 = 1730 mg  Final Dose = 1730 mg  Route = IV  Fluid & Volume =  mL  Admin Duration = Over 30 min    Do not administer until urine spec gravity is less than or equal to 1.010      <10% difference, OK to treat with final dose   Medication = Cytarabine (VERITO-C)  Base Dose = 75 mg/m2  Calc Dose: Base Dose x 1.76 m2 = 132 mg  Final Dose = 130 mg  Route = IV  Fluid & Volume = in syringe; 6.5 mL  Admin Duration = Over 5 min          <10% difference, OK to treat with final dose     By my signature below, I confirm this process was performed independently with the BSA and all final chemotherapy dosing calculations congruent. I have reviewed the above chemotherapy order and that my calculation of the final dose and BSA (when applicable) corroborate those calculations of the  pharmacist. Discrepancies of 10% or greater in the written dose have been addressed and documented within the [facility] Progress notes.    Kalyn Salgado, PharmD  "

## 2022-07-06 NOTE — PROGRESS NOTES
[facility] for  doctors office visit, and chemotherapy administration.      Afebrile.  VSS.  Awake and alert in no acute distress.      PICC accesed and dressing assessed. Brisk blood return noted.  Pt tolerated well.      Dr. Rodriguez at bedside for visit.     Chemotherapy dosage calculated independently by Tammie Covington RN and Cherrie Urbano RN and compared to road map for protocol [id number].  Calculations within 10% of written order.  Lab results reviewed.      Premedications and chemo given as ordered, see MAR.  Blood return verified prior to and after chemotherapy infusion.  See Chemotherapy flowsheet.  PT tolerated well.  No side effects or complications noted.  PICC flushed with 20ml of NS after completion. PT home with mother.  Will return for next visit on 7/7/22.

## 2022-07-07 ENCOUNTER — HOSPITAL ENCOUNTER (OUTPATIENT)
Dept: INFUSION CENTER | Facility: MEDICAL CENTER | Age: 22
End: 2022-07-07
Attending: PEDIATRICS
Payer: COMMERCIAL

## 2022-07-07 VITALS
DIASTOLIC BLOOD PRESSURE: 74 MMHG | HEART RATE: 107 BPM | BODY MASS INDEX: 23.24 KG/M2 | SYSTOLIC BLOOD PRESSURE: 128 MMHG | WEIGHT: 144.62 LBS | OXYGEN SATURATION: 100 % | HEIGHT: 66 IN | TEMPERATURE: 98 F | RESPIRATION RATE: 20 BRPM

## 2022-07-07 DIAGNOSIS — C91.Z0 B LYMPHOBLASTIC LEUKEMIA WITH T(9;22)(Q34;Q11.2);BCR-ABL1 (HCC): ICD-10-CM

## 2022-07-07 DIAGNOSIS — C91.00 PHILADELPHIA CHROMOSOME POSITIVE ACUTE LYMPHOBLASTIC LEUKEMIA (HCC): ICD-10-CM

## 2022-07-07 DIAGNOSIS — Z51.11 ENCOUNTER FOR ANTINEOPLASTIC CHEMOTHERAPY: ICD-10-CM

## 2022-07-07 PROCEDURE — 96409 CHEMO IV PUSH SNGL DRUG: CPT

## 2022-07-07 PROCEDURE — 700111 HCHG RX REV CODE 636 W/ 250 OVERRIDE (IP): Performed by: PEDIATRICS

## 2022-07-07 RX ADMIN — CYTARABINE 130 MG: 20 INJECTION, SOLUTION INTRATHECAL; INTRAVENOUS; SUBCUTANEOUS at 10:00

## 2022-07-07 ASSESSMENT — FIBROSIS 4 INDEX: FIB4 SCORE: 0.31

## 2022-07-07 NOTE — PROGRESS NOTES
Pediatric Hematology / Oncology  Progress Note      Patient Name:  Tomas Jean-Baptiste  : 2000   MRN: [id number]    Location of Service:  [facility]  Date of Service: 2022  Time: 10:49 AM    Primary Care Physician: Margarito Arvizu M.D.    Protocol/Treatment Plan:    HISTORY OF PRESENT ILLNESS:     Chief Complaint: Here for chemotherapy; cytarabine day 2 of four.    History of Present Illness: Tomas Jean-Baptiste is a 21 y.o. male who presents to the [facility] for scheduled chemotherapy.  Today is Day 2 of Induction 1B treatment on protocol [id number] for [place] chromosome positive acute B-Lymphoblastic Leukemia in remission.     Per JULY and his mother, he did reasonably well overnight.  On arrival here, however, he is feeling very nauseated.    His mother reports that she did not start oral mercaptopurine yesterday, because she was confused by the treatment calendar.      PAST MEDICAL HISTORY:     Allergies:   Allergies as of 2022 - Reviewed 2022   Allergen Reaction Noted   • Amoxicillin Rash 2020       Home Medications:    Current Outpatient Medications   Medication Sig Dispense Refill   • vitamin D3 (CHOLECALCIFEROL) 1000 Unit (25 mcg) Tab Take 1,000 Units by mouth every day.     • mercaptopurine (PURINETHOL) 50 MG Tab Take 2 tablets (100 mg) daily Tue - Sun and 2.5 tablets (125 mg) on  only.  Continue for 28 days total. 58 Tablet 0   • apixaban (ELIQUIS) 5mg Tab Take 1 Tablet by mouth 2 times a day. Indications: DVT/PE 28 Tablet 2   • Sodium Chloride Flush (NORMAL SALINE FLUSH) 0.9 % Solution Infuse 10 mL into a venous catheter every 12 hours. 600 mL 0   • sulfamethoxazole-trimethoprim (BACTRIM) 400-80 MG Tab Take 2 tablets by mouth twice daily every Saturday and  40 Tablet 11   • OLANZapine (ZYPREXA) 5 MG Tab Take 1 Tablet by mouth every evening. 30 Tablet 0   •  "imatinib (GLEEVEC) 400 MG tablet Take 1.5 Tablets by mouth every day for 30 days. 45 Tablet 5   • polyethylene glycol/lytes (MIRALAX) 17 g Pack Mix 1 Packet per package instructions and drink by mouth 1 time a day as needed (if sennosides and docusate ineffective after 24 hours). 10 Each 3   • ascorbic acid (ASCORBIC ACID) 500 MG Tab Take 500 mg by mouth every day.     • therapeutic multivitamin-minerals (THERAGRAN-M) Tab Take 1 Tab by mouth every day.     • ondansetron (ZOFRAN ODT) 8 MG TABLET DISPERSIBLE Dissovle 1 Tablet by mouth every 8 hours as needed for Nausea. (Patient not taking: Reported on 7/6/2022) 20 Tablet 0   • senna-docusate (PERICOLACE OR SENOKOT S) 8.6-50 MG Tab Take 2 Tablets by mouth every day. (Patient not taking: Reported on 7/6/2022) 40 Tablet 1   • famotidine (PEPCID) 20 MG Tab Take 1 Tablet by mouth 2 times a day. (Patient not taking: No sig reported) 60 Tablet 1   • ammonium lactate (LAC-HYDRIN) 12 % Lotion Apply to peeling areas of feet twice daily as needed (Patient not taking: No sig reported) 500 g 0     No current facility-administered medications for this encounter.        OBJECTIVE:     Vitals:   /73   Pulse (!) 107   Temp 37.2 °C (99 °F) (Temporal)   Resp 20   Ht 1.674 m (5' 5.91\")   Wt 66.3 kg (146 lb 2.6 oz)   SpO2 100%       Physical Exam:    Constitutional: Well-developed, well-nourished, ill-appearing (nausea).  HENT: Normocephalic and atraumatic. No nasal congestion or rhinorrhea. Oropharynx is clear and moist.    Eyes: Conjunctivae are normal.  No scleral icterus.    Neck: Normal range of motion of neck, no adenopathy.    Cardiovascular: Normal rate, regular rhythm and normal heart sounds.  No murmur heard. Distal cap refill < 2 sec  Pulmonary/Chest: Effort normal and breath sounds normal.  Symmetric expansion.    Abdomen: Soft. Bowel sounds are normal. No distension and no mass. There is no hepatosplenomegaly.     Psychiatric: Mood and affect normal for " "age.      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  JULY was scheduled to begin oral mercaptopurine yesterday, but his mother was confused by the treatment calendar and did not administer the medication.  My recommendation today is that she increase his daily doses for each of the next 4 days from 100 mg (as originally scheduled) to 125 mg.  This will effectively \"make up\" for the missed 100 mg dose yesterday; his total dose for the week will be as originally planned, but administered over 6 days instead of 7.      Encounter for antineoplastic chemotherapy     Today, we will administer IV cytarabine 75 mg/m², dose #2 of four.      JULY will RTC tomorrow for cytarabine dose #3.    ANN MARIE Rodriguez MD  Pediatric Hematology / Oncology  [facility]  Cell.  [phone]  Office. [phone]          "

## 2022-07-07 NOTE — PROGRESS NOTES
"Pharmacy Chemotherapy Calculation    Dx: ALL         Protocol: [id number] Induction IB   *Dosing Reference*      Allergies:  Amoxicillin     /72   Pulse (!) 117   Temp 36.4 °C (97.6 °F) (Temporal)   Resp 20   Ht 1.664 m (5' 5.51\")   Wt 65.6 kg (144 lb 10 oz)   SpO2 99%   BMI 23.69 kg/m²  Body surface area is 1.74 meters squared.    7/5/22 labs reviewed and meet ANC and plt parameters according to TP/roadmap.     Drug Order   (Drug name, dose, route, IV Fluid & volume, frequency, number of doses) Cycle: Induction IB; Day 3 of 4   Previous treatment: Induction day 22 on 7/6/20/2022 and IT dose given 6/27/2022    Medication = cyclophosphamide  Base Dose = 1000 mg/m2  Calc Dose: Base Dose x 1.73 m2 = 1730 mg  Final Dose = 1730 mg  Route = IV  Fluid & Volume =  mL  Admin Duration = Over 30 min    Do not administer until urine spec gravity is less than or equal to 1.010      <10% difference, OK to treat with final dose   Medication = Cytarabine (VERITO-C)  Base Dose = 75 mg/m2  Calc Dose: Base Dose x 1.74 m2 = 130.5 mg  Final Dose = 130 mg  Route = IV  Fluid & Volume = in syringe; 6.5 mL  Admin Duration = Over 5 min          <10% difference, OK to treat with final dose     By my signature below, I confirm this process was performed independently with the BSA and all final chemotherapy dosing calculations congruent. I have reviewed the above chemotherapy order and that my calculation of the final dose and BSA (when applicable) corroborate those calculations of the  pharmacist. Discrepancies of 10% or greater in the written dose have been addressed and documented within the [facility] Progress notes.    Marifer Torres, PharmD  "

## 2022-07-07 NOTE — PROGRESS NOTES
Pt to [facility] for doctors office visit, and chemotherapy administration.      Afebrile.  VSS.  Awake and alert in no acute distress.      PICC dressing CDI.  Brisk blood return and flushes easily. Pt tolerated well.     Pt reports taking Zofran at 0840.  Office visit completed by Dr Rodriguez.    Chemotherapy dosage calculated independently by Huong Foster, MONTSERRAT and Tammie Covington RN and compared to road map for protocol [id number].  Calculations within 10% of written order.      Chemo given as ordered, see MAR.  Blood return verified prior to, during, and after chemotherapy infusion.   PT tolerated well.  No side effects or complications noted.  PICC line flushed with 20 ml NS. PT discharged  home .  Will return for next visit on 07/08/22.

## 2022-07-08 ENCOUNTER — HOSPITAL ENCOUNTER (OUTPATIENT)
Dept: INFUSION CENTER | Facility: MEDICAL CENTER | Age: 22
End: 2022-07-08
Attending: PEDIATRICS
Payer: COMMERCIAL

## 2022-07-08 VITALS
BODY MASS INDEX: 23.31 KG/M2 | TEMPERATURE: 97.8 F | RESPIRATION RATE: 18 BRPM | OXYGEN SATURATION: 98 % | SYSTOLIC BLOOD PRESSURE: 117 MMHG | HEIGHT: 66 IN | WEIGHT: 145.06 LBS | HEART RATE: 95 BPM | DIASTOLIC BLOOD PRESSURE: 64 MMHG

## 2022-07-08 DIAGNOSIS — Z51.11 ENCOUNTER FOR ANTINEOPLASTIC CHEMOTHERAPY: ICD-10-CM

## 2022-07-08 DIAGNOSIS — D64.81 ANEMIA DUE TO ANTINEOPLASTIC CHEMOTHERAPY: ICD-10-CM

## 2022-07-08 DIAGNOSIS — T45.1X5A ANEMIA DUE TO ANTINEOPLASTIC CHEMOTHERAPY: ICD-10-CM

## 2022-07-08 DIAGNOSIS — C91.Z0 B LYMPHOBLASTIC LEUKEMIA WITH T(9;22)(Q34;Q11.2);BCR-ABL1 (HCC): ICD-10-CM

## 2022-07-08 DIAGNOSIS — C91.00 PHILADELPHIA CHROMOSOME POSITIVE ACUTE LYMPHOBLASTIC LEUKEMIA (HCC): ICD-10-CM

## 2022-07-08 LAB
ANISOCYTOSIS BLD QL SMEAR: ABNORMAL
BARCODED ABORH UBTYP: 5100
BARCODED PRD CODE UBPRD: NORMAL
BARCODED UNIT NUM UBUNT: NORMAL
BASOPHILS # BLD AUTO: 0.3 % (ref 0–1.8)
BASOPHILS # BLD: 0.01 K/UL (ref 0–0.12)
COMMENT 1642: NORMAL
COMPONENT R 8504R: NORMAL
EOSINOPHIL # BLD AUTO: 0 K/UL (ref 0–0.51)
EOSINOPHIL NFR BLD: 0 % (ref 0–6.9)
ERYTHROCYTE [DISTWIDTH] IN BLOOD BY AUTOMATED COUNT: 68.4 FL (ref 35.9–50)
HCT VFR BLD AUTO: 24.3 % (ref 42–52)
HGB BLD-MCNC: 7.7 G/DL (ref 14–18)
IMM GRANULOCYTES # BLD AUTO: 0.02 K/UL (ref 0–0.11)
IMM GRANULOCYTES NFR BLD AUTO: 0.6 % (ref 0–0.9)
LYMPHOCYTES # BLD AUTO: 0.25 K/UL (ref 1–4.8)
LYMPHOCYTES NFR BLD: 8.1 % (ref 22–41)
MACROCYTES BLD QL SMEAR: ABNORMAL
MCH RBC QN AUTO: 31.4 PG (ref 27–33)
MCHC RBC AUTO-ENTMCNC: 31.7 G/DL (ref 33.7–35.3)
MCV RBC AUTO: 99.2 FL (ref 81.4–97.8)
MICROCYTES BLD QL SMEAR: ABNORMAL
MONOCYTES # BLD AUTO: 0.24 K/UL (ref 0–0.85)
MONOCYTES NFR BLD AUTO: 7.8 % (ref 0–13.4)
MORPHOLOGY BLD-IMP: NORMAL
NEUTROPHILS # BLD AUTO: 2.56 K/UL (ref 1.82–7.42)
NEUTROPHILS NFR BLD: 83.2 % (ref 44–72)
NRBC # BLD AUTO: 0.05 K/UL
NRBC BLD-RTO: 1.6 /100 WBC
PLATELET # BLD AUTO: 278 K/UL (ref 164–446)
PLATELET BLD QL SMEAR: NORMAL
PMV BLD AUTO: 8.8 FL (ref 9–12.9)
POLYCHROMASIA BLD QL SMEAR: NORMAL
PRODUCT TYPE UPROD: NORMAL
RBC # BLD AUTO: 2.45 M/UL (ref 4.7–6.1)
RBC BLD AUTO: PRESENT
UNIT STATUS USTAT: NORMAL
WBC # BLD AUTO: 3.1 K/UL (ref 4.8–10.8)

## 2022-07-08 PROCEDURE — 36430 TRANSFUSION BLD/BLD COMPNT: CPT

## 2022-07-08 PROCEDURE — 86945 BLOOD PRODUCT/IRRADIATION: CPT

## 2022-07-08 PROCEDURE — 700111 HCHG RX REV CODE 636 W/ 250 OVERRIDE (IP): Performed by: PEDIATRICS

## 2022-07-08 PROCEDURE — P9016 RBC LEUKOCYTES REDUCED: HCPCS

## 2022-07-08 PROCEDURE — 85025 COMPLETE CBC W/AUTO DIFF WBC: CPT

## 2022-07-08 PROCEDURE — 999999 HB NO CHARGE

## 2022-07-08 PROCEDURE — 86923 COMPATIBILITY TEST ELECTRIC: CPT

## 2022-07-08 PROCEDURE — 36592 COLLECT BLOOD FROM PICC: CPT

## 2022-07-08 PROCEDURE — 96409 CHEMO IV PUSH SNGL DRUG: CPT

## 2022-07-08 RX ORDER — LORAZEPAM 2 MG/ML
1 INJECTION INTRAMUSCULAR EVERY 6 HOURS PRN
Status: CANCELLED | OUTPATIENT
Start: 2022-07-12

## 2022-07-08 RX ORDER — ONDANSETRON 2 MG/ML
8 INJECTION INTRAMUSCULAR; INTRAVENOUS EVERY 8 HOURS PRN
Status: CANCELLED | OUTPATIENT
Start: 2022-07-12

## 2022-07-08 RX ORDER — SODIUM CHLORIDE 9 MG/ML
500 INJECTION, SOLUTION INTRAVENOUS CONTINUOUS
Status: CANCELLED | OUTPATIENT
Start: 2022-07-15

## 2022-07-08 RX ORDER — ONDANSETRON 2 MG/ML
8 INJECTION INTRAMUSCULAR; INTRAVENOUS ONCE
Status: CANCELLED | OUTPATIENT
Start: 2022-07-15

## 2022-07-08 RX ORDER — LIDOCAINE AND PRILOCAINE 25; 25 MG/G; MG/G
CREAM TOPICAL PRN
Status: CANCELLED | OUTPATIENT
Start: 2022-07-13

## 2022-07-08 RX ORDER — LIDOCAINE AND PRILOCAINE 25; 25 MG/G; MG/G
CREAM TOPICAL PRN
Status: CANCELLED | OUTPATIENT
Start: 2022-07-15

## 2022-07-08 RX ORDER — ACETAMINOPHEN 325 MG/1
650 TABLET ORAL PRN
Status: CANCELLED | OUTPATIENT
Start: 2022-07-08

## 2022-07-08 RX ORDER — ONDANSETRON 2 MG/ML
8 INJECTION INTRAMUSCULAR; INTRAVENOUS ONCE
Status: DISCONTINUED | OUTPATIENT
Start: 2022-07-08 | End: 2022-07-09 | Stop reason: HOSPADM

## 2022-07-08 RX ORDER — LIDOCAINE AND PRILOCAINE 25; 25 MG/G; MG/G
CREAM TOPICAL PRN
Status: CANCELLED | OUTPATIENT
Start: 2022-07-12

## 2022-07-08 RX ORDER — SODIUM CHLORIDE 9 MG/ML
500 INJECTION, SOLUTION INTRAVENOUS CONTINUOUS
Status: CANCELLED | OUTPATIENT
Start: 2022-07-14

## 2022-07-08 RX ORDER — DIPHENHYDRAMINE HYDROCHLORIDE 50 MG/ML
25 INJECTION INTRAMUSCULAR; INTRAVENOUS PRN
Status: DISCONTINUED | OUTPATIENT
Start: 2022-07-08 | End: 2022-07-09 | Stop reason: HOSPADM

## 2022-07-08 RX ORDER — SODIUM CHLORIDE 9 MG/ML
500 INJECTION, SOLUTION INTRAVENOUS CONTINUOUS
Status: CANCELLED | OUTPATIENT
Start: 2022-07-13

## 2022-07-08 RX ORDER — LORAZEPAM 2 MG/ML
1 INJECTION INTRAMUSCULAR EVERY 6 HOURS PRN
Status: CANCELLED | OUTPATIENT
Start: 2022-07-14

## 2022-07-08 RX ORDER — LORAZEPAM 2 MG/ML
1 INJECTION INTRAMUSCULAR EVERY 6 HOURS PRN
Status: CANCELLED | OUTPATIENT
Start: 2022-07-13

## 2022-07-08 RX ORDER — ACETAMINOPHEN 325 MG/1
650 TABLET ORAL PRN
Status: DISCONTINUED | OUTPATIENT
Start: 2022-07-08 | End: 2022-07-09 | Stop reason: HOSPADM

## 2022-07-08 RX ORDER — ONDANSETRON 2 MG/ML
8 INJECTION INTRAMUSCULAR; INTRAVENOUS ONCE
Status: CANCELLED | OUTPATIENT
Start: 2022-07-13

## 2022-07-08 RX ORDER — ONDANSETRON 2 MG/ML
8 INJECTION INTRAMUSCULAR; INTRAVENOUS EVERY 8 HOURS PRN
Status: CANCELLED | OUTPATIENT
Start: 2022-07-13

## 2022-07-08 RX ORDER — ONDANSETRON 2 MG/ML
8 INJECTION INTRAMUSCULAR; INTRAVENOUS EVERY 8 HOURS PRN
Status: CANCELLED | OUTPATIENT
Start: 2022-07-14

## 2022-07-08 RX ORDER — LORAZEPAM 2 MG/ML
1 INJECTION INTRAMUSCULAR EVERY 6 HOURS PRN
Status: CANCELLED | OUTPATIENT
Start: 2022-07-15

## 2022-07-08 RX ORDER — ONDANSETRON 2 MG/ML
8 INJECTION INTRAMUSCULAR; INTRAVENOUS EVERY 8 HOURS PRN
Status: CANCELLED | OUTPATIENT
Start: 2022-07-15

## 2022-07-08 RX ORDER — LIDOCAINE AND PRILOCAINE 25; 25 MG/G; MG/G
CREAM TOPICAL PRN
Status: CANCELLED | OUTPATIENT
Start: 2022-07-14

## 2022-07-08 RX ORDER — ONDANSETRON 2 MG/ML
8 INJECTION INTRAMUSCULAR; INTRAVENOUS ONCE
Status: CANCELLED | OUTPATIENT
Start: 2022-07-12

## 2022-07-08 RX ORDER — DIPHENHYDRAMINE HYDROCHLORIDE 50 MG/ML
25 INJECTION INTRAMUSCULAR; INTRAVENOUS PRN
Status: CANCELLED | OUTPATIENT
Start: 2022-07-08

## 2022-07-08 RX ORDER — ONDANSETRON 2 MG/ML
8 INJECTION INTRAMUSCULAR; INTRAVENOUS ONCE
Status: CANCELLED | OUTPATIENT
Start: 2022-07-14

## 2022-07-08 RX ORDER — SODIUM CHLORIDE 9 MG/ML
500 INJECTION, SOLUTION INTRAVENOUS CONTINUOUS
Status: CANCELLED | OUTPATIENT
Start: 2022-07-12

## 2022-07-08 RX ADMIN — CYTARABINE 130 MG: 20 INJECTION, SOLUTION INTRATHECAL; INTRAVENOUS; SUBCUTANEOUS at 10:15

## 2022-07-08 ASSESSMENT — FIBROSIS 4 INDEX: FIB4 SCORE: 0.31

## 2022-07-08 NOTE — PROGRESS NOTES
Pt to [facility] for lab draw, doctors office visit, and chemotherapy administration.      Afebrile.  VSS.  Awake and alert in no acute distress.      PICC dressing CDI.  PICC flushes easily with brisk blood return Labs drawn  without difficulty.   Pt tolerated well.      Office visit completed with DR Rodriguez.    Chemotherapy dosage calculated independently by Huong Foster, MONTSERRAT and Dayna Damian RN and compared to road map for protocol [id number].  Calculations within 10% of written order.  Lab results reviewed.      Chemo given as ordered, see MAR.  Blood return verified prior to, during, and after chemotherapy infusion.    PT tolerated well.  No side effects or complications noted.     Hgb 7.7.  Transfuse 1 unit per Dr Rodriguez.    PRBC : Donor # [id number] started at 1055    Total volume infused:373    Vital signs monitored per protocol. Transfusion completed at 1258 and PT tolerated well.  PICC flushed with 20ml NS. Follow up instructions given.  Home with self.  Next appointment scheduled on 07/12/22.

## 2022-07-08 NOTE — PROGRESS NOTES
"Pharmacy Chemotherapy Calculation    Dx: ALL         Protocol: [id number] Induction IB   *Dosing Reference*      Allergies:  Amoxicillin     Ht 1.67 m (5' 5.75\")   Wt 65.8 kg (145 lb 1 oz)   BMI 23.59 kg/m²  Body surface area is 1.75 meters squared.     Treatment plan 167.5 cm 64 kg 1.73 m²     7/5/22 labs reviewed and meet ANC and plt parameters according to TP/roadmap.     Drug Order   (Drug name, dose, route, IV Fluid & volume, frequency, number of doses) Cycle: Induction IB; Day 4 of 4   Previous treatment: Induction day 3 on 7/7/20/2022 and IT dose given 6/27/2022    Medication = cyclophosphamide  Base Dose = 1000 mg/m2  Calc Dose: Base Dose x 1.73 m2 = 1730 mg  Final Dose = 1730 mg  Route = IV  Fluid & Volume =  mL  Admin Duration = Over 30 min    Do not administer until urine spec gravity is less than or equal to 1.010      <10% difference, OK to treat with final dose   Medication = Cytarabine (VERITO-C)  Base Dose = 75 mg/m2  Calc Dose: Base Dose x 1.75 m2 = 131 mg  Final Dose = 130 mg  Route = IV  Fluid & Volume = in syringe; 6.5 mL  Admin Duration = Over 5 min          <10% difference, OK to treat with final dose     By my signature below, I confirm this process was performed independently with the BSA and all final chemotherapy dosing calculations congruent. I have reviewed the above chemotherapy order and that my calculation of the final dose and BSA (when applicable) corroborate those calculations of the  pharmacist. Discrepancies of 10% or greater in the written dose have been addressed and documented within the [facility] Progress notes.    Rodriguez Mohan, PharmD  "

## 2022-07-08 NOTE — PROGRESS NOTES
Pediatric Hematology / Oncology  Progress Note      Patient Name:  Tomas Jean-Baptiste  : 2000   MRN: [id number]    Location of Service:  [facility]  Date of Service: 2022  Time: 1:09 PM    Primary Care Physician: Margarito Arvizu M.D.    Protocol/Treatment Plan:    HISTORY OF PRESENT ILLNESS:     Chief Complaint: Here for chemotherapy, possible pRBC transfusion.     History of Present Illness: Tomas Jean-Baptiste is a 21 y.o. male who presents to the [facility] for scheduled chemotherapy.  Today is Day 4 of Induction 1B treatment as per protocol [id number] for [place] chromosome-positive Acute B-Lymphoblastic Leukemia.     JULY reports feeling better in general: less nausea.  He still c/o headache at times.    Review of Systems:     Constitutional: Afebrile. Good appetite.  HENT: Negative for URI symptoms, mouth sores.  Respiratory: Negative for shortness of breath or cough.    Gastrointestinal: Negative for nausea, vomiting, abdominal pain, diarrhea, constipation and blood in stool.    Skin: Negative for rash, signs of infection.  Psychiatric/Behavioral: No changes in mood, appropriate for age.     PAST MEDICAL HISTORY:       Allergies:   Allergies as of 2022 - Reviewed 2022   Allergen Reaction Noted   • Amoxicillin Rash 2020       Home Medications:    Current Outpatient Medications   Medication Sig Dispense Refill   • vitamin D3 (CHOLECALCIFEROL) 1000 Unit (25 mcg) Tab Take 1,000 Units by mouth every day.     • mercaptopurine (PURINETHOL) 50 MG Tab Take 2 tablets (100 mg) daily Tue - Sun and 2.5 tablets (125 mg) on  only.  Continue for 28 days total. 58 Tablet 0   • apixaban (ELIQUIS) 5mg Tab Take 1 Tablet by mouth 2 times a day. Indications: DVT/PE 28 Tablet 2   • Sodium Chloride Flush (NORMAL SALINE FLUSH) 0.9 % Solution Infuse 10 mL into a venous catheter every 12 hours.  "600 mL 0   • sulfamethoxazole-trimethoprim (BACTRIM) 400-80 MG Tab Take 2 tablets by mouth twice daily every Saturday and Sunday 40 Tablet 11   • OLANZapine (ZYPREXA) 5 MG Tab Take 1 Tablet by mouth every evening. 30 Tablet 0   • imatinib (GLEEVEC) 400 MG tablet Take 1.5 Tablets by mouth every day for 30 days. 45 Tablet 5   • ondansetron (ZOFRAN ODT) 8 MG TABLET DISPERSIBLE Dissovle 1 Tablet by mouth every 8 hours as needed for Nausea. 20 Tablet 0   • senna-docusate (PERICOLACE OR SENOKOT S) 8.6-50 MG Tab Take 2 Tablets by mouth every day. (Patient not taking: Reported on 7/6/2022) 40 Tablet 1   • polyethylene glycol/lytes (MIRALAX) 17 g Pack Mix 1 Packet per package instructions and drink by mouth 1 time a day as needed (if sennosides and docusate ineffective after 24 hours). 10 Each 3   • famotidine (PEPCID) 20 MG Tab Take 1 Tablet by mouth 2 times a day. 60 Tablet 1   • ammonium lactate (LAC-HYDRIN) 12 % Lotion Apply to peeling areas of feet twice daily as needed (Patient not taking: No sig reported) 500 g 0   • ascorbic acid (ASCORBIC ACID) 500 MG Tab Take 500 mg by mouth every day.     • therapeutic multivitamin-minerals (THERAGRAN-M) Tab Take 1 Tab by mouth every day.       Current Facility-Administered Medications   Medication Dose Route Frequency Provider Last Rate Last Admin   • ondansetron (ZOFRAN) syringe/vial injection 8 mg  8 mg Intravenous Once River Rodriguez M.D.       • hydrocortisone sodium succinate PF (Solu-CORTEF) 100 MG injection 100 mg  100 mg Intravenous PRN River Rodriguez M.D.       • acetaminophen (Tylenol) tablet 650 mg  650 mg Oral PRN River Rodriguez M.D.       • diphenhydrAMINE (BENADRYL) injection 25 mg  25 mg Intravenous PRN River Rodriguez M.D.            OBJECTIVE:     Vitals:   /64   Pulse 95   Temp 36.6 °C (97.8 °F) (Temporal)   Resp 18   Ht 1.67 m (5' 5.75\")   Wt 65.8 kg (145 lb 1 oz)   SpO2 98%     Labs:    Hospital Outpatient Visit on " 07/08/2022   Component Date Value   • WBC 07/08/2022 3.1 (A)   • RBC 07/08/2022 2.45 (A)   • Hemoglobin 07/08/2022 7.7 (A)   • Hematocrit 07/08/2022 24.3 (A)   • MCV 07/08/2022 99.2 (A)   • MCH 07/08/2022 31.4    • MCHC 07/08/2022 31.7 (A)   • RDW 07/08/2022 68.4 (A)   • Platelet Count 07/08/2022 278    • MPV 07/08/2022 8.8 (A)   • Neutrophils-Polys 07/08/2022 83.20 (A)   • Lymphocytes 07/08/2022 8.10 (A)   • Monocytes 07/08/2022 7.80    • Eosinophils 07/08/2022 0.00    • Basophils 07/08/2022 0.30    • Immature Granulocytes 07/08/2022 0.60    • Nucleated RBC 07/08/2022 1.60        Physical Exam:    Constitutional: Well-developed, well-nourished, and in no distress.  pale.  HENT: Normocephalic and atraumatic. No nasal congestion or rhinorrhea. Oropharynx is clear and moist.   Eyes: Conjunctivae are normal. No scleral icterus.    Neck: Normal range of motion of neck, no adenopathy.    Cardiovascular: Normal rate, regular rhythm and normal heart sounds.  No murmur heard. Distal cap refill < 2 sec  Pulmonary/Chest: Effort normal and breath sounds normal.  Symmetric expansion.     Psychiatric: Mood and affect normal for age.      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    Relevant Medications    ondansetron (ZOFRAN) syringe/vial injection 8 mg    Other Relevant Orders    Nursing Communication    CBC WITH DIFFERENTIAL (Completed)    Encounter for antineoplastic chemotherapy      Today's treatment is IV cytarabine 75 mg per metered square, dose #4 of four.    Relevant Medications    ondansetron (ZOFRAN) syringe/vial injection 8 mg    Other Relevant Orders    Nursing Communication    CBC WITH DIFFERENTIAL (Completed)    Other specified anemias     Despite receiving a red cell transfusion 3 days ago, hemoglobin is less than 8 g/dL and expected to fall.  In addition, JULY is complaining  of headache.  We will proceed with another 1 unit pRBC transfusion today.      Relevant Medications    hydrocortisone sodium succinate PF (Solu-CORTEF) 100 MG injection 100 mg    acetaminophen (Tylenol) tablet 650 mg    diphenhydrAMINE (BENADRYL) injection 25 mg    Other Relevant Orders    Release Red Blood Cells (1 unit) (Completed)      GC will continue oral mercaptopurine.  He will RTC next Tuesday for day 8 treatment, which includes IV cytarabine and intrathecal methotrexate.  I reminded him to hold his evening dose of apixaban on Monday, in anticipation of a lumbar puncture the next day.    More than 50% of today's 30-minute face-to-face visit was spent on counseling and coordination of care.  This included approximately 15 minutes spent in filling out [place] paperwork for JULY's mother.    ANN MARIE Rodriguez MD  Pediatric Hematology / Oncology  [facility]  Cell.  [phone]  Office. [phone]

## 2022-07-11 ENCOUNTER — TELEPHONE (OUTPATIENT)
Dept: INFUSION CENTER | Facility: MEDICAL CENTER | Age: 22
End: 2022-07-11
Payer: COMMERCIAL

## 2022-07-11 NOTE — TELEPHONE ENCOUNTER
Call placed to patient's patient. Ensured patient doing well from previous visit. Discussed any concerns or questions with patient, none at this time. Patient very happy with care he received

## 2022-07-11 NOTE — PROGRESS NOTES
"Pharmacy Chemotherapy Calculations Note:    Dx: B-ALL, PH+    Cycle: Induction IB Days 8-11  Previous treatment: Induction IB Days 1-4 on 7/5-7/8/22     Protocol: Induction 1B per [id number] (on [id number] phase 3 trial starting Induction 1A Part 2 D15)             /63   Pulse (!) 101   Temp 36.8 °C (98.3 °F) (Temporal)   Resp 18   Ht 1.67 m (5' 5.75\")   Wt 66.3 kg (146 lb 2.6 oz)   SpO2 98%   BMI 23.77 kg/m²  Body surface area is 1.75 meters squared.  Tx Plan Values for induction starting 7/5/22:   Ht 167.5 cm Wt 64 kg BSA 1.73 m²       Labs 7/12/22:  ANC~ 2560 Plt = 181k   Hgb = 7.6        Labs 7/5/22:  SCr = 0.67 mg/dL CrCl > 125 mL/min (min Scr 0.7 used)  AST/ALT/AP = 43/120/82 TBili = 0.2  DBili < 0.2          IT Methotrexate 12 mg fixed dose for age > 3 yo = 12 mg   Fixed dose; no calc required = 12 mg IT to be given by MD on Day 8     Cytarabine 75 mg/m² x 1.75 m² = 131.25 mg   <10% difference, okay to treat with final dose = 130 mg IV on Days 8 to 11    Marifer Torres, PharmD, BCOP                "

## 2022-07-12 ENCOUNTER — HOSPITAL ENCOUNTER (OUTPATIENT)
Dept: INFUSION CENTER | Facility: MEDICAL CENTER | Age: 22
End: 2022-07-12
Attending: PEDIATRICS
Payer: COMMERCIAL

## 2022-07-12 VITALS
BODY MASS INDEX: 23.49 KG/M2 | OXYGEN SATURATION: 99 % | WEIGHT: 146.16 LBS | TEMPERATURE: 98 F | RESPIRATION RATE: 18 BRPM | DIASTOLIC BLOOD PRESSURE: 69 MMHG | HEART RATE: 107 BPM | SYSTOLIC BLOOD PRESSURE: 112 MMHG | HEIGHT: 66 IN

## 2022-07-12 DIAGNOSIS — Z51.11 ENCOUNTER FOR ANTINEOPLASTIC CHEMOTHERAPY: ICD-10-CM

## 2022-07-12 DIAGNOSIS — Z01.89 ENCOUNTER FOR LUMBAR PUNCTURE: ICD-10-CM

## 2022-07-12 DIAGNOSIS — C91.Z0 B LYMPHOBLASTIC LEUKEMIA WITH T(9;22)(Q34;Q11.2);BCR-ABL1 (HCC): ICD-10-CM

## 2022-07-12 DIAGNOSIS — C91.00 PHILADELPHIA CHROMOSOME POSITIVE ACUTE LYMPHOBLASTIC LEUKEMIA (HCC): ICD-10-CM

## 2022-07-12 LAB
ANISOCYTOSIS BLD QL SMEAR: ABNORMAL
BASOPHILS # BLD AUTO: 0.8 % (ref 0–1.8)
BASOPHILS # BLD: 0.01 K/UL (ref 0–0.12)
BURR CELLS/RBC NFR CSF MANUAL: 29 %
CLARITY CSF: CLEAR
COLOR CSF: COLORLESS
COLOR SPUN CSF: COLORLESS
COMMENT 1642: NORMAL
CYTOLOGY REG CYTOL: NORMAL
EOSINOPHIL # BLD AUTO: 0 K/UL (ref 0–0.51)
EOSINOPHIL NFR BLD: 0 % (ref 0–6.9)
ERYTHROCYTE [DISTWIDTH] IN BLOOD BY AUTOMATED COUNT: 69.8 FL (ref 35.9–50)
HCT VFR BLD AUTO: 24.4 % (ref 42–52)
HGB BLD-MCNC: 7.6 G/DL (ref 14–18)
IMM GRANULOCYTES # BLD AUTO: 0.01 K/UL (ref 0–0.11)
IMM GRANULOCYTES NFR BLD AUTO: 0.8 % (ref 0–0.9)
LYMPHOCYTES # BLD AUTO: 0.3 K/UL (ref 1–4.8)
LYMPHOCYTES NFR BLD: 23.8 % (ref 22–41)
LYMPHOCYTES NFR CSF: 92 %
MACROCYTES BLD QL SMEAR: ABNORMAL
MCH RBC QN AUTO: 31.3 PG (ref 27–33)
MCHC RBC AUTO-ENTMCNC: 31.1 G/DL (ref 33.7–35.3)
MCV RBC AUTO: 100.4 FL (ref 81.4–97.8)
MICROCYTES BLD QL SMEAR: ABNORMAL
MONOCYTES # BLD AUTO: 0.06 K/UL (ref 0–0.85)
MONOCYTES NFR BLD AUTO: 4.8 % (ref 0–13.4)
MONONUC CELLS NFR CSF: 3 %
MORPHOLOGY BLD-IMP: NORMAL
NEUTROPHILS # BLD AUTO: 0.88 K/UL (ref 1.82–7.42)
NEUTROPHILS NFR BLD: 69.8 % (ref 44–72)
NEUTROPHILS NFR CSF: 5 %
NEUTS HYPERSEG BLD QL SMEAR: NORMAL
NRBC # BLD AUTO: 0 K/UL
NRBC BLD-RTO: 0 /100 WBC
PLATELET # BLD AUTO: 181 K/UL (ref 164–446)
PLATELET BLD QL SMEAR: NORMAL
PMV BLD AUTO: 8.4 FL (ref 9–12.9)
RBC # BLD AUTO: 2.43 M/UL (ref 4.7–6.1)
RBC # CSF: 57 CELLS/UL
RBC BLD AUTO: PRESENT
SPECIMEN VOL CSF: 2 ML
TUBE # CSF: 2
TUBE # CSF: 2
WBC # BLD AUTO: 1.3 K/UL (ref 4.8–10.8)
WBC # CSF: 4 CELLS/UL (ref 0–10)

## 2022-07-12 PROCEDURE — 89051 BODY FLUID CELL COUNT: CPT

## 2022-07-12 PROCEDURE — 85025 COMPLETE CBC W/AUTO DIFF WBC: CPT

## 2022-07-12 PROCEDURE — 99214 OFFICE O/P EST MOD 30 MIN: CPT | Mod: 25 | Performed by: PEDIATRICS

## 2022-07-12 PROCEDURE — 96375 TX/PRO/DX INJ NEW DRUG ADDON: CPT

## 2022-07-12 PROCEDURE — 99211 OFF/OP EST MAY X REQ PHY/QHP: CPT

## 2022-07-12 PROCEDURE — 700111 HCHG RX REV CODE 636 W/ 250 OVERRIDE (IP): Performed by: PEDIATRICS

## 2022-07-12 PROCEDURE — 36592 COLLECT BLOOD FROM PICC: CPT

## 2022-07-12 PROCEDURE — 96450 CHEMOTHERAPY INTO CNS: CPT

## 2022-07-12 PROCEDURE — 700101 HCHG RX REV CODE 250: Performed by: PEDIATRICS

## 2022-07-12 PROCEDURE — 96450 CHEMOTHERAPY INTO CNS: CPT | Performed by: PEDIATRICS

## 2022-07-12 PROCEDURE — 96409 CHEMO IV PUSH SNGL DRUG: CPT

## 2022-07-12 PROCEDURE — 88108 CYTOPATH CONCENTRATE TECH: CPT

## 2022-07-12 RX ORDER — ONDANSETRON 2 MG/ML
8 INJECTION INTRAMUSCULAR; INTRAVENOUS EVERY 8 HOURS PRN
Status: DISCONTINUED | OUTPATIENT
Start: 2022-07-12 | End: 2022-07-13 | Stop reason: HOSPADM

## 2022-07-12 RX ORDER — LORAZEPAM 2 MG/ML
1 INJECTION INTRAMUSCULAR EVERY 6 HOURS PRN
Status: DISCONTINUED | OUTPATIENT
Start: 2022-07-12 | End: 2022-07-13 | Stop reason: HOSPADM

## 2022-07-12 RX ORDER — LIDOCAINE AND PRILOCAINE 25; 25 MG/G; MG/G
CREAM TOPICAL PRN
Status: DISCONTINUED | OUTPATIENT
Start: 2022-07-12 | End: 2022-07-13 | Stop reason: HOSPADM

## 2022-07-12 RX ORDER — MIDAZOLAM HYDROCHLORIDE 1 MG/ML
2 INJECTION INTRAMUSCULAR; INTRAVENOUS ONCE
Status: COMPLETED | OUTPATIENT
Start: 2022-07-12 | End: 2022-07-12

## 2022-07-12 RX ADMIN — MIDAZOLAM HYDROCHLORIDE 2 MG: 1 INJECTION, SOLUTION INTRAMUSCULAR; INTRAVENOUS at 09:42

## 2022-07-12 RX ADMIN — METHOTREXATE 12 MG: 25 INJECTION INTRA-ARTERIAL; INTRAMUSCULAR; INTRATHECAL; INTRAVENOUS at 09:49

## 2022-07-12 RX ADMIN — CYTARABINE 130 MG: 20 INJECTION, SOLUTION INTRATHECAL; INTRAVENOUS; SUBCUTANEOUS at 10:23

## 2022-07-12 RX ADMIN — LIDOCAINE AND PRILOCAINE 5 G: 25; 25 CREAM TOPICAL at 08:20

## 2022-07-12 ASSESSMENT — FIBROSIS 4 INDEX: FIB4 SCORE: 0.28

## 2022-07-12 NOTE — PROGRESS NOTES
"Pharmacy Chemotherapy Calculations    Patient Name: Tomas Jean-Baptiste      Diagnosis: Ph+ ALL     Regimen/Dosing Reference: Induction IB (OS) per [id number]       Allergies: Amoxicillin       /63   Pulse (!) 101   Temp 36.8 °C (98.3 °F) (Temporal)   Resp 18   Ht 1.67 m (5' 5.75\")   Wt 66.3 kg (146 lb 2.6 oz)   SpO2 98%   BMI 23.77 kg/m²  Body surface area is 1.75 meters squared.     Weight Type Height Weight BSA Additional Details   Treatment plan 167.5 cm 64 kg 1.73 m² Documented weight as of 7/5/2022  7:41 AM; height as of 7/5/2022  7:41 AM     Parameters met according to treatment plan/roadmap 7/12/22.      Drug Order   (Drug name, dose, route, IV Fluid & volume, frequency, number of doses) Induction IB, Day 8 of 11       Previous treatment: Day 4 on 7/8/22     Medication = Methotrexate PF   Base Dose = 12 mg for age >/= to 3 yo   Calc Dose: No calculations required   Final Dose = 12 mg  Route = INTRATHECAL   Fluid & Volume = pf NS 6 mL  Administration by MD Only     Day 8       <10% difference, OK to treat with final dose   Medication = Cytarabine (VERITO-C)  Base Dose = 75 mg/m2/dose  Calc Dose: Base Dose x 1.75 m2 = 131.25 mg  Final Dose = 130 mg  Route = IV  Fluid & Volume = 6.5 mL chemo in syringe   Admin Duration = Over 5 minutes    Days 8 - 11       <10% difference, OK to treat with final dose     By my signature below, I confirm this process was performed independently with the BSA and all final chemotherapy dosing calculations congruent. I have reviewed the above chemotherapy order and that my calculation of the final dose and BSA (when applicable) corroborate those calculations of the  pharmacist. Discrepancies of 5% or greater in the written dose have been addressed and documented within the [facility] Progress notes.    Signature: Kayla Juarez, Jackeline, BCPS        "

## 2022-07-12 NOTE — PROCEDURES
Pediatric Oncology Lumbar Puncture  Procedure Note      Patient Name:  Tomas Jean-Baptiste  : 2000   MRN: [id number]    Service Location:  [facility]  Date of Service: 2022  Time: 9:45 AM    Procedure Performed By: Pepe Faye M.D.    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 male diagnosed with Ph+ Acute Precursor B-Cell Lymphoblastic Leukemia having achieved flow cytometric remission at the end of Induction IA.  He presents today for scheduled chemotherapy, Induction IB, Day 8 therapy and scheduled lumbar puncture with intrathecal chemotherapy.  Prior to the procedure, the risks and benefits were discussed with the patient.  Consent for the procedure was signed by the patient and placed in his chart.  All pertinent labs and history were reviewed and a complete History and Physical Examination were performed and placed in the medical record.  Tomas Roy was then taken to the procedure room where the procedure was performed.  All necessary safety equipment per ASA guidelines were available.  A time-out was performed and the patient identified by name,  and medical record number.  Goves and mask were worn during the entire procedure.  Tomas Roy was prepped and draped in the usual sterile fashion with povoiodine.  He was  positioned in the left decubitus position and all landmarks including superior posterior iliac crest, umbilicus and vertebral bodies were identified by palpation.  A 3.5 in, 22 gauge spinal needle was introduced into the L3-L4 spinal interspace.  6 ml of clear fluid were obtained and sent for cell count and cytology.  Methotrexate 12 mg in 6 mL of NS was verified with the nurse bedside and then then administered into the spinal fluid. Tomas Roy tolerated the procedure without complication or bleeding.  he and his parents were instructed that he lie flat for 1 hour following the procedure.    Results:    PENDING    Pepe Faye MD  Pediatric Hematology / Oncology  [facility]  Cell.  [phone]

## 2022-07-12 NOTE — PROGRESS NOTES
PT to [facility] for LP with IT methotrexate and chemo, accompanied by mother.      Afebrile.  VSS. PICC line accessed with 1 attempt. Labs drawn and sent. PICC line dressing changed. PT tolerated well.      Tatiana from Lab called with critical result of WBC 1.3 at 0855. Critical lab result read back to Tatiana.   Dr. Faye notified of critical lab result at 0855.  Critical lab result read back by Dr. Faye.    Verified patency prior to procedure. Procedure performed by Dr. Faye.      Start Time: 0940    Monitored PT q5min and documented VS q10min per protocol.  LP completed at 0949.   See MAR for medication adminsitration.  No unexpected events.  PT woke from sedation without complications.      Stop time: 0949    Chemotherapy dosage calculated independently by Tammie Covington RN and Dayna Damian RN and compared to road map for protocol [id number].  Calculations within 10% of written order.  Lab results reviewed.      Pt took Zofran PTA. Blood return verified prior to, and after chemotherapy infusion.  See Chemotherapy flowsheet.  PT tolerated well.  No side effects or complications noted.  PICC line flushed with 10mL NS after completion.     PT tolerated regular diet and ambulated independently.   Pt and mother instructed that results will be made available to the ordering provider and to contact that provider for follow-up.  Discharged home with mother once discharge criteria met. Next appt on 7/13/22 for chemo.

## 2022-07-12 NOTE — H&P
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 counseling    - Discontinue olanzapine as above     8) Hypoalbuminemic:              - Disease and therapy related, albumin 3.0              - Due in part to PEG-Asp - can assume coagulation factors affected                 - Discussed diet and encouraged protein intake     9) Transaminitis:              - Mildly elevated ALT at the beginning of Induction IB at 120    - No additional labs obtained since starting block of therapy with mercaptopurine    - Will obtain CMP at some point this week to verify that transaminases have not further increased    10) Social:             - Continue with support   - Discussed return to school and activity with patient today, recommendations will depend loosely on randomization following Induction IB     11) Access:               - R PICC placed, C/D/I, BID flushes              - Will consider replacing with Port-a-cath after End of Induction IB bone marrow evaluations      15) Research Participant:           [facility]  - Source Data       Diagnosis: Ph+ Precursor B-Cell Acute Lymphoblastic Leukemia     Disease Status: EOI 1A MRD remission, CNS3c, testicular negative, HSV1 IgG POSITIVE, CMV IgG NEGATIVE, VARICELLA IgG POSITIVE     Active Studies: [id number], [id number]                                                                                                                                             Inactive Studies: [id number]    Optional Studies: None             Protocol: International Phase 3 trial in [place] 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     Treatment Plan:   [id number](OS), Induction 1B, Day 8    Height: 1.675 m      Weight: 64 kg       BSA: 1.73 m²   (Start of Induction 1B 7/5/2022)                                                                                                                                             Performance Status: Karnofsky 90, ECOG  1       Current Therapeutic Medications:  (verified 100% compliance)     Imatinib 600 mg PO daily (start 6/3/2022)   Mercaptopurine 100 mg PO Tues-Sun and 125 mg Mon (start 7/5/2022)     Evaluations / Study Labs (obtained 7/12/2022):     WBC 1.3, Hgb 7.6, platelets 181  ,      CSF studies pending      Therapy Given (7/12/2022):     Cytarabine 130 mg IV x1 dose (75 mg/m²)  Methotrexate 12 mg IT x1 dose     Will continue imatinib and mercaptopurine at home as instructed       Disposition: Following chemotherapy, admit for transfusional needs overnight.    Pepe Faye MD  Pediatric Hematology / Oncology  [facility]  Cell.  [phone]  Office. [phone]

## 2022-07-13 ENCOUNTER — HOSPITAL ENCOUNTER (OUTPATIENT)
Dept: INFUSION CENTER | Facility: MEDICAL CENTER | Age: 22
End: 2022-07-13
Attending: PEDIATRICS
Payer: COMMERCIAL

## 2022-07-13 VITALS
HEART RATE: 91 BPM | TEMPERATURE: 97.9 F | RESPIRATION RATE: 20 BRPM | OXYGEN SATURATION: 99 % | SYSTOLIC BLOOD PRESSURE: 116 MMHG | WEIGHT: 146.61 LBS | BODY MASS INDEX: 23.56 KG/M2 | DIASTOLIC BLOOD PRESSURE: 65 MMHG | HEIGHT: 66 IN

## 2022-07-13 DIAGNOSIS — T45.1X5A ANEMIA DUE TO ANTINEOPLASTIC CHEMOTHERAPY: ICD-10-CM

## 2022-07-13 DIAGNOSIS — Z51.11 ENCOUNTER FOR ANTINEOPLASTIC CHEMOTHERAPY: ICD-10-CM

## 2022-07-13 DIAGNOSIS — C91.00 PHILADELPHIA CHROMOSOME POSITIVE ACUTE LYMPHOBLASTIC LEUKEMIA (HCC): ICD-10-CM

## 2022-07-13 DIAGNOSIS — C91.00 PRE B-CELL ACUTE LYMPHOBLASTIC LEUKEMIA (ALL) (HCC): ICD-10-CM

## 2022-07-13 DIAGNOSIS — C91.Z0 B LYMPHOBLASTIC LEUKEMIA WITH T(9;22)(Q34;Q11.2);BCR-ABL1 (HCC): ICD-10-CM

## 2022-07-13 DIAGNOSIS — D64.81 ANEMIA DUE TO ANTINEOPLASTIC CHEMOTHERAPY: ICD-10-CM

## 2022-07-13 DIAGNOSIS — Z01.89 ENCOUNTER FOR LUMBAR PUNCTURE: ICD-10-CM

## 2022-07-13 LAB
ABO GROUP BLD: NORMAL
ALBUMIN SERPL BCP-MCNC: 3.9 G/DL (ref 3.2–4.9)
ALBUMIN/GLOB SERPL: 1.7 G/DL
ALP SERPL-CCNC: 73 U/L (ref 30–99)
ALT SERPL-CCNC: 125 U/L (ref 2–50)
ANION GAP SERPL CALC-SCNC: 11 MMOL/L (ref 7–16)
AST SERPL-CCNC: 48 U/L (ref 12–45)
BARCODED ABORH UBTYP: 5100
BARCODED PRD CODE UBPRD: NORMAL
BARCODED UNIT NUM UBUNT: NORMAL
BILIRUB SERPL-MCNC: 0.6 MG/DL (ref 0.1–1.5)
BLD GP AB SCN SERPL QL: NORMAL
BUN SERPL-MCNC: 9 MG/DL (ref 8–22)
CALCIUM SERPL-MCNC: 8.3 MG/DL (ref 8.5–10.5)
CHLORIDE SERPL-SCNC: 108 MMOL/L (ref 96–112)
CO2 SERPL-SCNC: 24 MMOL/L (ref 20–33)
COMPONENT R 8504R: NORMAL
CREAT SERPL-MCNC: 0.33 MG/DL (ref 0.5–1.4)
GFR SERPLBLD CREATININE-BSD FMLA CKD-EPI: 169 ML/MIN/1.73 M 2
GLOBULIN SER CALC-MCNC: 2.3 G/DL (ref 1.9–3.5)
GLUCOSE SERPL-MCNC: 118 MG/DL (ref 65–99)
POTASSIUM SERPL-SCNC: 4 MMOL/L (ref 3.6–5.5)
PRODUCT TYPE UPROD: NORMAL
PROT SERPL-MCNC: 6.2 G/DL (ref 6–8.2)
RH BLD: NORMAL
SODIUM SERPL-SCNC: 143 MMOL/L (ref 135–145)
UNIT STATUS USTAT: NORMAL

## 2022-07-13 PROCEDURE — 86923 COMPATIBILITY TEST ELECTRIC: CPT

## 2022-07-13 PROCEDURE — 36430 TRANSFUSION BLD/BLD COMPNT: CPT

## 2022-07-13 PROCEDURE — 80053 COMPREHEN METABOLIC PANEL: CPT

## 2022-07-13 PROCEDURE — P9016 RBC LEUKOCYTES REDUCED: HCPCS

## 2022-07-13 PROCEDURE — 86945 BLOOD PRODUCT/IRRADIATION: CPT

## 2022-07-13 PROCEDURE — 306780 HCHG STAT FOR TRANSFUSION PER CASE

## 2022-07-13 PROCEDURE — 700105 HCHG RX REV CODE 258: Performed by: PEDIATRICS

## 2022-07-13 PROCEDURE — 86900 BLOOD TYPING SEROLOGIC ABO: CPT

## 2022-07-13 PROCEDURE — 86901 BLOOD TYPING SEROLOGIC RH(D): CPT

## 2022-07-13 PROCEDURE — 36592 COLLECT BLOOD FROM PICC: CPT

## 2022-07-13 PROCEDURE — 86850 RBC ANTIBODY SCREEN: CPT

## 2022-07-13 PROCEDURE — 96409 CHEMO IV PUSH SNGL DRUG: CPT

## 2022-07-13 PROCEDURE — 99213 OFFICE O/P EST LOW 20 MIN: CPT | Performed by: PEDIATRICS

## 2022-07-13 PROCEDURE — 700111 HCHG RX REV CODE 636 W/ 250 OVERRIDE (IP): Performed by: PEDIATRICS

## 2022-07-13 RX ORDER — ACETAMINOPHEN 325 MG/1
650 TABLET ORAL PRN
Status: CANCELLED | OUTPATIENT
Start: 2022-07-13

## 2022-07-13 RX ORDER — DIPHENHYDRAMINE HYDROCHLORIDE 50 MG/ML
25 INJECTION INTRAMUSCULAR; INTRAVENOUS PRN
Status: CANCELLED | OUTPATIENT
Start: 2022-07-13

## 2022-07-13 RX ORDER — SODIUM CHLORIDE 9 MG/ML
1000 INJECTION, SOLUTION INTRAVENOUS ONCE
Status: CANCELLED
Start: 2022-07-13

## 2022-07-13 RX ORDER — ONDANSETRON 2 MG/ML
8 INJECTION INTRAMUSCULAR; INTRAVENOUS ONCE
Status: DISCONTINUED | OUTPATIENT
Start: 2022-07-13 | End: 2022-07-14 | Stop reason: HOSPADM

## 2022-07-13 RX ORDER — SODIUM CHLORIDE 9 MG/ML
1000 INJECTION, SOLUTION INTRAVENOUS ONCE
Status: COMPLETED | OUTPATIENT
Start: 2022-07-13 | End: 2022-07-13

## 2022-07-13 RX ADMIN — SODIUM CHLORIDE 1000 ML: 9 INJECTION, SOLUTION INTRAVENOUS at 09:33

## 2022-07-13 RX ADMIN — CYTARABINE 130 MG: 20 INJECTION, SOLUTION INTRATHECAL; INTRAVENOUS; SUBCUTANEOUS at 10:07

## 2022-07-13 ASSESSMENT — FIBROSIS 4 INDEX: FIB4 SCORE: 0.43

## 2022-07-13 NOTE — PROGRESS NOTES
[facility] to [facility] for lab draw, doctors office visit, and chemotherapy administration.      Afebrile.  VSS.  Awake and alert in no acute distress.      PICC line flushes easily with brisk blood return.  Dressing CDI.  Labs drawn from the PICC without difficulty.   Pt tolerated well.     Pt reports Zofran taken at 0845 PTA.    Pt reports not feeling well. Dr Faye notified.  COD sent. NS bolus started.  Pt would prefer transfusion today.  NS bolus stopped per Dr Faye.    Chemotherapy dosage calculated independently by Huong Foster RN and Tammie Covington RN and compared to road map for protocol [id number].  Calculations within 10% of written order.      Chemo given as ordered, see MAR.  Blood return verified prior to, during, and after chemotherapy infusion.   PT tolerated well.  No side effects or complications noted.     PRBC : Donor # [id number] started at 1042    Total volume infused:398    Vital signs monitored per protocol. Transfusion completed at 1247 and PT tolerated well. PICC flushed with 20ml NS.  Follow up instructions given.  Home with self.  Next appointment scheduled on 07.   Port flushed per orders (see MAR) and de-accessed after completion. PT home with self.  Will return for next visit on 07/14/22.

## 2022-07-13 NOTE — PROGRESS NOTES
"Pharmacy Chemotherapy Calculations    Patient Name: Tomas Jean-Baptiste      Diagnosis: Ph+ ALL     Regimen/Dosing Reference: Induction IB (OS) per [id number]       Allergies: Amoxicillin       /75   Pulse (!) 130   Temp 36.6 °C (97.9 °F) (Temporal)   Resp 20   Ht 1.67 m (5' 5.75\")   Wt 66.5 kg (146 lb 9.7 oz)   SpO2 98%   BMI 23.84 kg/m²  Body surface area is 1.76 meters squared.     Weight Type Height Weight BSA Additional Details   Treatment plan 167.5 cm 64 kg 1.73 m² Documented weight as of 7/5/2022  7:41 AM; height as of 7/5/2022  7:41 AM     Parameters met according to treatment plan/roadmap 7/12/22.      Drug Order   (Drug name, dose, route, IV Fluid & volume, frequency, number of doses) Induction IB, Day 9       Previous treatment: Day 4 on 7/8/22     Medication = Cytarabine (VERITO-C)  Base Dose = 75 mg/m2  Calc Dose: Base Dose x 1.76 m2 = 132 mg  Final Dose = 130 mg  Route = IV  Fluid & Volume = 6.5 mL chemo in syringe   Admin Duration = Over 5 minutes    Days 8 - 11       <10% difference, OK to treat with final dose     By my signature below, I confirm this process was performed independently with the BSA and all final chemotherapy dosing calculations congruent. I have reviewed the above chemotherapy order and that my calculation of the final dose and BSA (when applicable) corroborate those calculations of the  pharmacist. Discrepancies of 10% or greater in the written dose have been addressed and documented within the [facility] Progress notes.    Judy Bryant, PharmD, BCOP          "

## 2022-07-14 ENCOUNTER — HOSPITAL ENCOUNTER (OUTPATIENT)
Dept: INFUSION CENTER | Facility: MEDICAL CENTER | Age: 22
End: 2022-07-14
Attending: PEDIATRICS
Payer: COMMERCIAL

## 2022-07-14 VITALS
HEART RATE: 102 BPM | OXYGEN SATURATION: 99 % | TEMPERATURE: 97.9 F | BODY MASS INDEX: 23.7 KG/M2 | HEIGHT: 66 IN | WEIGHT: 147.49 LBS | RESPIRATION RATE: 20 BRPM | SYSTOLIC BLOOD PRESSURE: 111 MMHG | DIASTOLIC BLOOD PRESSURE: 73 MMHG

## 2022-07-14 DIAGNOSIS — Z01.89 ENCOUNTER FOR LUMBAR PUNCTURE: ICD-10-CM

## 2022-07-14 DIAGNOSIS — Z51.11 ENCOUNTER FOR ANTINEOPLASTIC CHEMOTHERAPY: ICD-10-CM

## 2022-07-14 DIAGNOSIS — C91.Z0 B LYMPHOBLASTIC LEUKEMIA WITH T(9;22)(Q34;Q11.2);BCR-ABL1 (HCC): ICD-10-CM

## 2022-07-14 DIAGNOSIS — C91.00 PHILADELPHIA CHROMOSOME POSITIVE ACUTE LYMPHOBLASTIC LEUKEMIA (HCC): ICD-10-CM

## 2022-07-14 PROCEDURE — 700111 HCHG RX REV CODE 636 W/ 250 OVERRIDE (IP): Performed by: PEDIATRICS

## 2022-07-14 PROCEDURE — 96409 CHEMO IV PUSH SNGL DRUG: CPT

## 2022-07-14 RX ORDER — ONDANSETRON 2 MG/ML
8 INJECTION INTRAMUSCULAR; INTRAVENOUS EVERY 8 HOURS PRN
Status: CANCELLED | OUTPATIENT
Start: 2022-07-19

## 2022-07-14 RX ORDER — ONDANSETRON 2 MG/ML
8 INJECTION INTRAMUSCULAR; INTRAVENOUS ONCE
Status: CANCELLED | OUTPATIENT
Start: 2022-08-02

## 2022-07-14 RX ORDER — ONDANSETRON 2 MG/ML
8 INJECTION INTRAMUSCULAR; INTRAVENOUS ONCE
Status: CANCELLED | OUTPATIENT
Start: 2022-07-14

## 2022-07-14 RX ORDER — ONDANSETRON 2 MG/ML
8 INJECTION INTRAMUSCULAR; INTRAVENOUS EVERY 8 HOURS PRN
Status: CANCELLED | OUTPATIENT
Start: 2022-07-22

## 2022-07-14 RX ORDER — ONDANSETRON 2 MG/ML
8 INJECTION INTRAMUSCULAR; INTRAVENOUS ONCE
Status: CANCELLED | OUTPATIENT
Start: 2022-07-19

## 2022-07-14 RX ORDER — LIDOCAINE AND PRILOCAINE 25; 25 MG/G; MG/G
CREAM TOPICAL PRN
Status: CANCELLED | OUTPATIENT
Start: 2022-07-22

## 2022-07-14 RX ORDER — ONDANSETRON 2 MG/ML
8 INJECTION INTRAMUSCULAR; INTRAVENOUS ONCE
Status: CANCELLED | OUTPATIENT
Start: 2022-07-15

## 2022-07-14 RX ORDER — ONDANSETRON 2 MG/ML
8 INJECTION INTRAMUSCULAR; INTRAVENOUS EVERY 8 HOURS PRN
Status: CANCELLED | OUTPATIENT
Start: 2022-07-20

## 2022-07-14 RX ORDER — ONDANSETRON 2 MG/ML
8 INJECTION INTRAMUSCULAR; INTRAVENOUS ONCE
Status: CANCELLED | OUTPATIENT
Start: 2022-07-21

## 2022-07-14 RX ORDER — ONDANSETRON 2 MG/ML
8 INJECTION INTRAMUSCULAR; INTRAVENOUS ONCE
Status: CANCELLED | OUTPATIENT
Start: 2022-08-10

## 2022-07-14 RX ORDER — LORAZEPAM 2 MG/ML
1 INJECTION INTRAMUSCULAR EVERY 6 HOURS PRN
Status: CANCELLED | OUTPATIENT
Start: 2022-07-20

## 2022-07-14 RX ORDER — SODIUM CHLORIDE 9 MG/ML
500 INJECTION, SOLUTION INTRAVENOUS CONTINUOUS
Status: CANCELLED | OUTPATIENT
Start: 2022-07-21

## 2022-07-14 RX ORDER — LIDOCAINE AND PRILOCAINE 25; 25 MG/G; MG/G
CREAM TOPICAL PRN
Status: CANCELLED | OUTPATIENT
Start: 2022-08-10

## 2022-07-14 RX ORDER — ONDANSETRON 2 MG/ML
8 INJECTION INTRAMUSCULAR; INTRAVENOUS EVERY 8 HOURS PRN
Status: CANCELLED | OUTPATIENT
Start: 2022-08-10

## 2022-07-14 RX ORDER — ONDANSETRON 2 MG/ML
8 INJECTION INTRAMUSCULAR; INTRAVENOUS ONCE
Status: CANCELLED | OUTPATIENT
Start: 2022-07-20

## 2022-07-14 RX ORDER — LORAZEPAM 2 MG/ML
1 INJECTION INTRAMUSCULAR EVERY 6 HOURS PRN
Status: CANCELLED | OUTPATIENT
Start: 2022-08-02

## 2022-07-14 RX ORDER — LORAZEPAM 2 MG/ML
1 INJECTION INTRAMUSCULAR EVERY 6 HOURS PRN
Status: CANCELLED | OUTPATIENT
Start: 2022-07-22

## 2022-07-14 RX ORDER — ONDANSETRON 2 MG/ML
8 INJECTION INTRAMUSCULAR; INTRAVENOUS EVERY 8 HOURS PRN
Status: CANCELLED | OUTPATIENT
Start: 2022-07-15

## 2022-07-14 RX ORDER — ONDANSETRON 2 MG/ML
8 INJECTION INTRAMUSCULAR; INTRAVENOUS EVERY 8 HOURS PRN
Status: CANCELLED | OUTPATIENT
Start: 2022-07-21

## 2022-07-14 RX ORDER — ONDANSETRON 2 MG/ML
8 INJECTION INTRAMUSCULAR; INTRAVENOUS EVERY 8 HOURS PRN
Status: CANCELLED | OUTPATIENT
Start: 2022-08-02

## 2022-07-14 RX ORDER — SODIUM CHLORIDE 9 MG/ML
500 INJECTION, SOLUTION INTRAVENOUS CONTINUOUS
Status: CANCELLED | OUTPATIENT
Start: 2022-07-19

## 2022-07-14 RX ORDER — ONDANSETRON 2 MG/ML
8 INJECTION INTRAMUSCULAR; INTRAVENOUS EVERY 8 HOURS PRN
Status: CANCELLED | OUTPATIENT
Start: 2022-07-14

## 2022-07-14 RX ORDER — SODIUM CHLORIDE 9 MG/ML
500 INJECTION, SOLUTION INTRAVENOUS CONTINUOUS
Status: CANCELLED | OUTPATIENT
Start: 2022-08-09

## 2022-07-14 RX ORDER — LIDOCAINE AND PRILOCAINE 25; 25 MG/G; MG/G
CREAM TOPICAL PRN
Status: CANCELLED | OUTPATIENT
Start: 2022-07-21

## 2022-07-14 RX ORDER — LIDOCAINE AND PRILOCAINE 25; 25 MG/G; MG/G
CREAM TOPICAL PRN
Status: CANCELLED | OUTPATIENT
Start: 2022-07-20

## 2022-07-14 RX ORDER — LORAZEPAM 2 MG/ML
1 INJECTION INTRAMUSCULAR EVERY 6 HOURS PRN
Status: CANCELLED | OUTPATIENT
Start: 2022-08-10

## 2022-07-14 RX ORDER — LIDOCAINE AND PRILOCAINE 25; 25 MG/G; MG/G
CREAM TOPICAL PRN
Status: CANCELLED | OUTPATIENT
Start: 2022-07-26

## 2022-07-14 RX ORDER — LIDOCAINE AND PRILOCAINE 25; 25 MG/G; MG/G
CREAM TOPICAL PRN
Status: CANCELLED | OUTPATIENT
Start: 2022-07-19

## 2022-07-14 RX ORDER — LORAZEPAM 2 MG/ML
1 INJECTION INTRAMUSCULAR EVERY 6 HOURS PRN
Status: CANCELLED | OUTPATIENT
Start: 2022-07-21

## 2022-07-14 RX ORDER — SODIUM CHLORIDE 9 MG/ML
500 INJECTION, SOLUTION INTRAVENOUS CONTINUOUS
Status: CANCELLED | OUTPATIENT
Start: 2022-07-20

## 2022-07-14 RX ORDER — SODIUM CHLORIDE 9 MG/ML
500 INJECTION, SOLUTION INTRAVENOUS CONTINUOUS
Status: CANCELLED | OUTPATIENT
Start: 2022-07-22

## 2022-07-14 RX ORDER — ONDANSETRON 2 MG/ML
8 INJECTION INTRAMUSCULAR; INTRAVENOUS ONCE
Status: CANCELLED | OUTPATIENT
Start: 2022-07-22

## 2022-07-14 RX ORDER — SODIUM CHLORIDE 9 MG/ML
500 INJECTION, SOLUTION INTRAVENOUS CONTINUOUS
Status: CANCELLED | OUTPATIENT
Start: 2022-08-10

## 2022-07-14 RX ORDER — LORAZEPAM 2 MG/ML
1 INJECTION INTRAMUSCULAR EVERY 6 HOURS PRN
Status: CANCELLED | OUTPATIENT
Start: 2022-07-19

## 2022-07-14 RX ADMIN — CYTARABINE 130 MG: 20 INJECTION, SOLUTION INTRATHECAL; INTRAVENOUS; SUBCUTANEOUS at 09:59

## 2022-07-14 ASSESSMENT — FIBROSIS 4 INDEX: FIB4 SCORE: 0.47

## 2022-07-14 NOTE — PROGRESS NOTES
Pt to [facility] for lab draw, doctors office visit, and chemotherapy administration.      Afebrile.  VSS.  Awake and alert in no acute distress.      PICC line flushes easily with brisk blood return.  Dressing CDI. Pt reports feeling much better after transfusion yesterday.  Zofran taken at home PTA.    Office visit completed with Dr Faye.  OK to proceed with chemo.    Chemotherapy dosage calculated independently by Huong Foster RN and Cherrie Urbano RN and compared to road map for protocol [id number].  Calculations within 10% of written order.      Chemo given as ordered, see MAR.  Blood return verified prior to, during, and after chemotherapy infusion. PT tolerated well.  No side effects or complications noted.  PICC line flushed with 20ml NS. PT home with self.  Will return for next visit on 0715/22.

## 2022-07-14 NOTE — PROGRESS NOTES
"Pediatric Hematology / Oncology  Progress Note      Patient Name:  Tomas Jean-Baptiste  : 2000   MRN: [id number]    Location of Service:  [facility]  Date of Service: 2022  Time: 9:30 AM    Primary Care Physician: Margarito Arvizu M.D.    Protocol/Treatment Plan:  [place] Chromosome Precursor B-Cell Acute Lymphoblastic Leukemia, ON STUDY [id number], Induction IB, Day 9     HISTORY OF PRESENT ILLNESS:     Chief Complaint: Scheduled chemotherapy, fatigue    History of Present Illness: Tomas Jean-Baptiste is a 21 y.o. male who presents to the [facility] for scheduled chemotherapy.  Today is Day 9 of [id number], Induction IB.   Tomas Roy presents to clinic by himself today.  He reports that he did not have any significant adverse events with yesterday's chemotherapy however he does feel considerably more fatigued and tired today than he did yesterday.    Review of Systems:     Constitutional:  Afebrile. No remote or acute illness.  Energy and activity are decreased from yesterday  HENT: Negative.  Eyes: Negative.  Respiratory: Negative.  Cardiovascular: Negative.  Gastrointestinal: Negative.  Genitourinary: Negative.  Musculoskeletal: Negative for joint or muscle pain.  Skin: Negative for rash, signs of infection.  Neurological: Negative for numbness, tingling, sensory changes, or headaches.    Endo/Heme/Allergies: Does not bruise/bleed easily.    Psychiatric/Behavioral: No changes in mood, appropriate for age.     PAST MEDICAL HISTORY:     No overnight changes in medical history.    OBJECTIVE:     Vitals:   /65   Pulse 91   Temp 36.6 °C (97.9 °F) (Temporal)   Resp 20   Ht 1.67 m (5' 5.75\")   Wt 66.5 kg (146 lb 9.7 oz)   SpO2 99%     Labs:    Hospital Outpatient Visit on 2022   Component Date Value   • ABO Grouping Only 2022 O    • Rh Grouping Only 2022 POS    • " Antibody Screen-Cod 07/13/2022 NEG    • Component R 07/13/2022                      Value:RI                  RedBloodCellsIRR    [id number]   transfused   07/13/22   10:25     • Product Type 07/13/2022 Red Blood Cells IRR LR    • Dispense Status 07/13/2022 transfused    • Unit Number (Barcoded) 07/13/2022 [id number]    • Product Code (Barcoded) 07/13/2022 [id number]    • Blood Type (Barcoded) 07/13/2022 5100    • Sodium 07/13/2022 143    • Potassium 07/13/2022 4.0    • Chloride 07/13/2022 108    • Co2 07/13/2022 24    • Anion Gap 07/13/2022 11.0    • Glucose 07/13/2022 118 (A)   • Bun 07/13/2022 9    • Creatinine 07/13/2022 0.33 (A)   • Calcium 07/13/2022 8.3 (A)   • AST(SGOT) 07/13/2022 48 (A)   • ALT(SGPT) 07/13/2022 125 (A)   • Alkaline Phosphatase 07/13/2022 73    • Total Bilirubin 07/13/2022 0.6    • Albumin 07/13/2022 3.9    • Total Protein 07/13/2022 6.2    • Globulin 07/13/2022 2.3    • A-G Ratio 07/13/2022 1.7    • GFR (CKD-EPI) 07/13/2022 169    Hospital Outpatient Visit on 07/12/2022   Component Date Value   • WBC 07/12/2022 1.3 (A)   • RBC 07/12/2022 2.43 (A)   • Hemoglobin 07/12/2022 7.6 (A)   • Hematocrit 07/12/2022 24.4 (A)   • MCV 07/12/2022 100.4 (A)   • MCH 07/12/2022 31.3    • MCHC 07/12/2022 31.1 (A)   • RDW 07/12/2022 69.8 (A)   • Platelet Count 07/12/2022 181    • MPV 07/12/2022 8.4 (A)   • Neutrophils-Polys 07/12/2022 69.80    • Lymphocytes 07/12/2022 23.80    • Monocytes 07/12/2022 4.80    • Eosinophils 07/12/2022 0.00    • Basophils 07/12/2022 0.80    • Immature Granulocytes 07/12/2022 0.80    • Nucleated RBC 07/12/2022 0.00    • Neutrophils (Absolute) 07/12/2022 0.88 (A)   • Lymphs (Absolute) 07/12/2022 0.30 (A)   • Monos (Absolute) 07/12/2022 0.06    • Eos (Absolute) 07/12/2022 0.00    • Baso (Absolute) 07/12/2022 0.01    • Immature Granulocytes (a* 07/12/2022 0.01    • NRBC (Absolute) 07/12/2022 0.00    • Anisocytosis 07/12/2022 2+ (A)   • Macrocytosis 07/12/2022 2+ (A)   •  Microcytosis 07/12/2022 1+    • Number Of Tubes 07/12/2022 2    • Volume 07/12/2022 2.0    • Color-Body Fluid 07/12/2022 Colorless    • Character-Body Fluid 07/12/2022 Clear    • Supernatant Appearance 07/12/2022 Colorless    • Total RBC Count 07/12/2022 57    • Crenated RBC 07/12/2022 29    • Total WBC Count 07/12/2022 4    • Polys 07/12/2022 5    • Lymphs 07/12/2022 92    • Mononuclear Cells - CSF 07/12/2022 3    • CSF Tube Number 07/12/2022 2    • Peripheral Smear Review 07/12/2022 see below    • Plt Estimation 07/12/2022 Normal    • RBC Morphology 07/12/2022 Present    • Hypersegmented Poly 07/12/2022 Few    • Comments-Diff 07/12/2022 see below    • Cytology Reg 07/12/2022 See Path Report      Physical Exam:    Constitutional: Well-developed, well-nourished, and in no distress.  Well-appearing but tired.  HENT: Normocephalic and atraumatic. No nasal congestion or rhinorrhea.  Eyes: Conjunctivae are normal. Pupils are equal, round.  Nonicteric.  Slight palsy of left eye otherwise EOMI.    Neck: Normal range of motion of neck, no adenopathy.    Cardiovascular: Normal rate, regular rhythm and normal heart sounds.  No murmur heard. DP/radial pulses 2+, cap refill < 2 sec.  Pulmonary/Chest: Effort normal and breath sounds normal. No respiratory distress. Symmetric expansion.  No crackles or wheezes.  Abdomen: Soft. Bowel sounds are normal. No distension and no mass. There is no hepatosplenomegaly.    Genitourinary:  Deferred.  Musculoskeletal: Normal range of motion of lower and upper extremities bilaterally.   Neurological: Alert and oriented to person and place. Exhibits normal muscle tone bilaterally in upper and lower extremities. Gait normal. Coordination normal.    Skin: Skin is warm, dry and pink.  No rash or evidence of skin infection.  No pallor.   Psychiatric: Mood and affect normal for age.    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              - Testicular status NEGATIVE                   - Flow cytometry of both 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, FISH positive for BCR-ABL1 translocation              - Enrolled on [facility] [id number]              - Initially enrolled on [facility] [id number] - but taken off study due to Ph+ ALL status                            - Enrolled on [facility] [id number] and will begin study today, Day 15 (6/13/2022)              - Started imatinib therapy 6/3/2022 (split dosing of imatinib 400 mg PO QAM and 200 mg PO QPM) - continued at Day 15 with imatinib 600 mg PO daily (100% compliant)                                                  Ph+ Acute B-Lymphoblastic Leukemia, ON STUDY [id number], Induction B, Day 9:                          ** Methotrexate 12 mg IT x1 in clinic today (COMPLETE)                          ** Cytarabine 130 mg IV x4 doses total on Days 8 (COMPLETE), 9, 10, and 11                          ** Continue Imatinib 600 mg  PO daily (100% compliant)                          ** Continue Mercaptopurine 100 mg PO daily Tues-Sun and 125 mg daily on Mon (100% compliant)                - Return to clinic tomorrow for Day 10 cytarabine                          2) Chemotherapy Related Pancytopenia:             - WBC 1.3, Hgb 7.6, platelets 181 yesterday             - ANC 880, ALC 300  yesterday             - Complaining of increased fatigue today   - Given worsening  symptoms of anemia will transfuse 1 unit of irradiated PRBC today in clinic             - Transfuse platelets for platelets < 10K or active bleeding             - Transfuse for Hgb < 7 or symptomatic (as above)     3) Chemotherapy Induced Nausea and Vomiting:              - Well-controlled at home with Zofran              - Zofran, Ativan PRN at home             - Patient asking to discontinue olanzapine, okay to discontinue at this time     4) Sixth Cranial Nerve Palsy (IMPROVED):              - Continue patching              - Continues to see significant improvement             - Scheduled to see Dr. Carranza (Ophthalmology) next week     5) Hypercoagulability / Superficial Right LE Thrombus / Subsegmental PE:              - Superficial thrombus on presentation, PE              - Compliant with apixaban 5 mg PO BID              - Anticoagulation held last night for procedure this AM              - Will restart apixiban later today              - Can consider discontinuation of apixaban given no asparaginase in over a month and no asparaginase in current blocker therapy     6) At Risk of Opportunistic Lung Infection:              - Continue Bactrim SS 2 tabs PO BID on Sat/Sun     7) Anxiety (IMPROVED GREATLY):     8) Transaminitis:              - Mildly elevated ALT at the beginning of Induction IB at 120              - Repeat CMP with AST 48 and , stable, total bilirubin 0.6    9) Social:             - Continue with support             - Discussed return to school and activity, recommendations will depend loosely on randomization following Induction IB     10) Access:               - R PICC placed, C/D/I, BID flushes              - Will consider replacing with Port-a-cath after End of Induction IB bone marrow evaluations      11) Research Participant:    Disposition: Return to clinic tomorrow for Day 10 of Induction IB with cytarabine    Pepe Faye MD  Pediatric Hematology / Oncology  [facility]  [facility]  Cell.  [phone]  [facility]. [phone]

## 2022-07-14 NOTE — PROGRESS NOTES
Pharmacy Chemotherapy Calculations    Patient Name: Tomas Jean-Baptiste      Diagnosis: Ph+ ALL     Regimen/Dosing Reference: Induction IB (OS) per [id number]       Allergies: Amoxicillin       /73   Pulse (!) 110   Temp 36.2 °C (97.2 °F) (Temporal)   Resp 20   Wt 66.9 kg (147 lb 7.8 oz)   SpO2 100%   BMI 23.99 kg/m²  Body surface area is 1.76 meters squared.     Weight Type Height Weight BSA Additional Details   Treatment plan 167.5 cm 64 kg 1.73 m² Documented weight as of 7/5/2022  7:41 AM; height as of 7/5/2022  7:41 AM     Parameters met according to treatment plan/roadmap 7/12/22.      Drug Order   (Drug name, dose, route, IV Fluid & volume, frequency, number of doses) Induction IB, Day 10       Previous treatment: Day 4 on 7/8/22     Medication = Cytarabine (VERITO-C)  Base Dose = 75 mg/m2  Calc Dose: Base Dose x 1.76 m2 = 132 mg  Final Dose = 130 mg  Route = IV  Fluid & Volume = 6.5 mL chemo in syringe   Admin Duration = Over 5 minutes    Days 8 - 11       <10% difference, OK to treat with final dose     By my signature below, I confirm this process was performed independently with the BSA and all final chemotherapy dosing calculations congruent. I have reviewed the above chemotherapy order and that my calculation of the final dose and BSA (when applicable) corroborate those calculations of the  pharmacist. Discrepancies of 10% or greater in the written dose have been addressed and documented within the [facility] Progress notes.    Judy Bryant, PharmD, BCOP

## 2022-07-15 ENCOUNTER — HOSPITAL ENCOUNTER (OUTPATIENT)
Dept: INFUSION CENTER | Facility: MEDICAL CENTER | Age: 22
End: 2022-07-15
Attending: PEDIATRICS
Payer: COMMERCIAL

## 2022-07-15 VITALS
RESPIRATION RATE: 18 BRPM | HEART RATE: 86 BPM | WEIGHT: 146.83 LBS | BODY MASS INDEX: 23.6 KG/M2 | SYSTOLIC BLOOD PRESSURE: 105 MMHG | OXYGEN SATURATION: 100 % | DIASTOLIC BLOOD PRESSURE: 64 MMHG | TEMPERATURE: 98.9 F | HEIGHT: 66 IN

## 2022-07-15 DIAGNOSIS — Z51.11 ENCOUNTER FOR ANTINEOPLASTIC CHEMOTHERAPY: ICD-10-CM

## 2022-07-15 DIAGNOSIS — C91.00 PHILADELPHIA CHROMOSOME POSITIVE ACUTE LYMPHOBLASTIC LEUKEMIA (HCC): ICD-10-CM

## 2022-07-15 DIAGNOSIS — Z01.89 ENCOUNTER FOR LUMBAR PUNCTURE: ICD-10-CM

## 2022-07-15 DIAGNOSIS — C91.Z0 B LYMPHOBLASTIC LEUKEMIA WITH T(9;22)(Q34;Q11.2);BCR-ABL1 (HCC): ICD-10-CM

## 2022-07-15 PROCEDURE — 700111 HCHG RX REV CODE 636 W/ 250 OVERRIDE (IP): Performed by: PEDIATRICS

## 2022-07-15 PROCEDURE — 96409 CHEMO IV PUSH SNGL DRUG: CPT

## 2022-07-15 RX ORDER — LORAZEPAM 2 MG/ML
1 INJECTION INTRAMUSCULAR EVERY 6 HOURS PRN
Status: DISCONTINUED | OUTPATIENT
Start: 2022-07-15 | End: 2022-07-16 | Stop reason: HOSPADM

## 2022-07-15 RX ORDER — ONDANSETRON 2 MG/ML
8 INJECTION INTRAMUSCULAR; INTRAVENOUS EVERY 8 HOURS PRN
Status: DISCONTINUED | OUTPATIENT
Start: 2022-07-15 | End: 2022-07-16 | Stop reason: HOSPADM

## 2022-07-15 RX ADMIN — CYTARABINE 130 MG: 20 INJECTION, SOLUTION INTRATHECAL; INTRAVENOUS; SUBCUTANEOUS at 12:27

## 2022-07-15 ASSESSMENT — FIBROSIS 4 INDEX: FIB4 SCORE: 0.47

## 2022-07-15 NOTE — PROGRESS NOTES
[facility] for doctors office visit, and chemotherapy administration.      Afebrile.  VSS.  Awake and alert in no acute distress.      PICC line accessed for patency and clave changed. Pt tolerated well.      Chemotherapy dosage calculated independently by Tammie Covington RN and Huong Foster RN and compared to road map for protocol [id number].  Calculations within 10% of written order.  Lab results reviewed.      Premedications and chemo given as ordered, see MAR.  Blood return verified prior to and after chemotherapy infusion.  See Chemotherapy flowsheet.  PT tolerated well.  No side effects or complications noted.  PICC line flushed with 10ml NS after completion. PT home with brother.  Will return for next visit on 7/19/22.

## 2022-07-15 NOTE — PROGRESS NOTES
"Pharmacy Chemotherapy Calculations    Patient Name: Tomas Jean-Baptiste      Diagnosis: Ph+ ALL     Regimen/Dosing Reference: Induction IB (OS) per [id number]       Allergies: Amoxicillin       /64   Pulse 86   Temp 37.2 °C (98.9 °F) (Temporal)   Resp 18   Ht 1.675 m (5' 5.95\")   Wt 66.6 kg (146 lb 13.2 oz)   SpO2 100%   BMI 23.74 kg/m²  Body surface area is 1.76 meters squared.     Weight Type Height Weight BSA Additional Details   Treatment plan 167.5 cm 64 kg 1.73 m² Documented weight as of 7/5/2022  7:41 AM; height as of 7/5/2022  7:41 AM     Parameters met according to treatment plan/roadmap 7/12/22.      Drug Order   (Drug name, dose, route, IV Fluid & volume, frequency, number of doses) Induction IB, Day 11       Previous treatment: Day 4 on 7/8/22     Medication = Cytarabine (VERITO-C)  Base Dose = 75 mg/m2  Calc Dose: Base Dose x 1.76 m2 = 132 mg  Final Dose = 130 mg  Route = IV  Fluid & Volume = 6.5 mL chemo in syringe   Admin Duration = Over 5 minutes    Days 8 - 11       <10% difference, OK to treat with final dose     By my signature below, I confirm this process was performed independently with the BSA and all final chemotherapy dosing calculations congruent. I have reviewed the above chemotherapy order and that my calculation of the final dose and BSA (when applicable) corroborate those calculations of the  pharmacist. Discrepancies of 10% or greater in the written dose have been addressed and documented within the [facility] Progress notes.    Judy Bryant, PharmD, BCOP          "

## 2022-07-18 ENCOUNTER — TELEPHONE (OUTPATIENT)
Dept: INFUSION CENTER | Facility: MEDICAL CENTER | Age: 22
End: 2022-07-18
Payer: COMMERCIAL

## 2022-07-19 ENCOUNTER — HOSPITAL ENCOUNTER (OUTPATIENT)
Dept: INFUSION CENTER | Facility: MEDICAL CENTER | Age: 22
End: 2022-07-19
Attending: PEDIATRICS
Payer: COMMERCIAL

## 2022-07-19 VITALS
TEMPERATURE: 98.7 F | HEART RATE: 68 BPM | HEIGHT: 66 IN | BODY MASS INDEX: 23.74 KG/M2 | WEIGHT: 147.71 LBS | SYSTOLIC BLOOD PRESSURE: 100 MMHG | OXYGEN SATURATION: 97 % | RESPIRATION RATE: 18 BRPM | DIASTOLIC BLOOD PRESSURE: 59 MMHG

## 2022-07-19 DIAGNOSIS — C91.00 PHILADELPHIA CHROMOSOME POSITIVE ACUTE LYMPHOBLASTIC LEUKEMIA (HCC): ICD-10-CM

## 2022-07-19 DIAGNOSIS — C91.Z0 B LYMPHOBLASTIC LEUKEMIA WITH T(9;22)(Q34;Q11.2);BCR-ABL1 (HCC): ICD-10-CM

## 2022-07-19 DIAGNOSIS — Z51.11 ENCOUNTER FOR ANTINEOPLASTIC CHEMOTHERAPY: ICD-10-CM

## 2022-07-19 PROBLEM — E88.09 HYPOALBUMINEMIA: Status: RESOLVED | Noted: 2022-06-27 | Resolved: 2022-07-19

## 2022-07-19 PROBLEM — E87.6 HYPOKALEMIA: Status: RESOLVED | Noted: 2022-06-27 | Resolved: 2022-07-19

## 2022-07-19 PROBLEM — R74.01 TRANSAMINITIS: Status: RESOLVED | Noted: 2022-06-27 | Resolved: 2022-07-19

## 2022-07-19 PROBLEM — H53.2 DIPLOPIA: Status: RESOLVED | Noted: 2022-06-27 | Resolved: 2022-07-19

## 2022-07-19 LAB
ABO GROUP BLD: NORMAL
BASOPHILS # BLD AUTO: 0 % (ref 0–1.8)
BASOPHILS # BLD: 0 K/UL (ref 0–0.12)
BLD GP AB SCN SERPL QL: NORMAL
EOSINOPHIL # BLD AUTO: 0 K/UL (ref 0–0.51)
EOSINOPHIL NFR BLD: 0 % (ref 0–6.9)
ERYTHROCYTE [DISTWIDTH] IN BLOOD BY AUTOMATED COUNT: 57.7 FL (ref 35.9–50)
HCT VFR BLD AUTO: 21.7 % (ref 42–52)
HGB BLD-MCNC: 7 G/DL (ref 14–18)
IMM GRANULOCYTES # BLD AUTO: 0 K/UL (ref 0–0.11)
IMM GRANULOCYTES NFR BLD AUTO: 0 % (ref 0–0.9)
LYMPHOCYTES # BLD AUTO: 0.4 K/UL (ref 1–4.8)
LYMPHOCYTES NFR BLD: 28.8 % (ref 22–41)
MCH RBC QN AUTO: 30 PG (ref 27–33)
MCHC RBC AUTO-ENTMCNC: 32.3 G/DL (ref 33.7–35.3)
MCV RBC AUTO: 93.1 FL (ref 81.4–97.8)
MONOCYTES # BLD AUTO: 0.04 K/UL (ref 0–0.85)
MONOCYTES NFR BLD AUTO: 2.9 % (ref 0–13.4)
NEUTROPHILS # BLD AUTO: 0.95 K/UL (ref 1.82–7.42)
NEUTROPHILS NFR BLD: 68.3 % (ref 44–72)
NRBC # BLD AUTO: 0 K/UL
NRBC BLD-RTO: 0 /100 WBC
PLATELET # BLD AUTO: 26 K/UL (ref 164–446)
PMV BLD AUTO: 12.3 FL (ref 9–12.9)
RBC # BLD AUTO: 2.33 M/UL (ref 4.7–6.1)
RH BLD: NORMAL
WBC # BLD AUTO: 1.4 K/UL (ref 4.8–10.8)

## 2022-07-19 PROCEDURE — 700111 HCHG RX REV CODE 636 W/ 250 OVERRIDE (IP): Performed by: PEDIATRICS

## 2022-07-19 PROCEDURE — 99214 OFFICE O/P EST MOD 30 MIN: CPT | Performed by: PEDIATRICS

## 2022-07-19 PROCEDURE — 36592 COLLECT BLOOD FROM PICC: CPT

## 2022-07-19 PROCEDURE — 85025 COMPLETE CBC W/AUTO DIFF WBC: CPT

## 2022-07-19 PROCEDURE — 86850 RBC ANTIBODY SCREEN: CPT

## 2022-07-19 PROCEDURE — 96409 CHEMO IV PUSH SNGL DRUG: CPT

## 2022-07-19 PROCEDURE — 86901 BLOOD TYPING SEROLOGIC RH(D): CPT

## 2022-07-19 PROCEDURE — 86900 BLOOD TYPING SEROLOGIC ABO: CPT

## 2022-07-19 RX ORDER — ONDANSETRON 2 MG/ML
8 INJECTION INTRAMUSCULAR; INTRAVENOUS ONCE
Status: DISCONTINUED | OUTPATIENT
Start: 2022-07-19 | End: 2022-07-20 | Stop reason: HOSPADM

## 2022-07-19 RX ADMIN — CYTARABINE 130 MG: 20 INJECTION, SOLUTION INTRATHECAL; INTRAVENOUS; SUBCUTANEOUS at 11:29

## 2022-07-19 ASSESSMENT — FIBROSIS 4 INDEX: FIB4 SCORE: 0.47
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
## 2022-07-19 NOTE — PROGRESS NOTES
"Pharmacy Chemotherapy Calculations Note:    Dx: B-ALL, PH+    Cycle: Induction IB Days 15-18  Previous treatment: Induction IB Days 8-11 on 7/12-7/15/22     Protocol: Induction 1B per [id number] (on [id number] phase 3 trial starting Induction 1A Part 2 D15)             Ht 1.672 m (5' 5.83\")   Wt 67 kg (147 lb 11.3 oz)   BMI 23.97 kg/m²  Body surface area is 1.76 meters squared.  Induction IB Dosing values:   Ht 167.5 cm Wt 64 kg BSA 1.73 m²     Labs from 7/19/22 reviewed - MD aware of low hgb and plts, getting blood today prior to chemo. Okay to proceed with Days 15-18      Cytarabine 75 mg/m² x 1.76 m² = 132 mg   <10% difference, okay to treat with final dose = 130 mg IV on Days 15-18    Judy Bryant, PharmD, BCOP                 "

## 2022-07-19 NOTE — PROGRESS NOTES
[facility] for lab draw, doctors office visit, and chemotherapy administration.      Afebrile.  VSS.  Awake and alert in no acute distress.      PICC line accessed by Dayna Damian RN with 1 attempt.  Labs drawn and sent. Pt tolerated well.      Chemotherapy dosage calculated independently by Tammie Covington RN and Dayna Damian RN and compared to road map for protocol [id number].  Calculations within 10% of written order.  Lab results reviewed.      Pt medicated with Zofran PTA.  Blood return verified prior to, during, and after chemotherapy infusion.  See Chemotherapy flowsheet.  PT tolerated well.  No side effects or complications noted.  PICC line after completion. PT home with self in stable condition.  Will return for next visit on 7/20/22.

## 2022-07-20 ENCOUNTER — HOSPITAL ENCOUNTER (OUTPATIENT)
Dept: INFUSION CENTER | Facility: MEDICAL CENTER | Age: 22
End: 2022-07-20
Attending: PEDIATRICS
Payer: COMMERCIAL

## 2022-07-20 VITALS
OXYGEN SATURATION: 98 % | RESPIRATION RATE: 18 BRPM | SYSTOLIC BLOOD PRESSURE: 116 MMHG | DIASTOLIC BLOOD PRESSURE: 75 MMHG | HEIGHT: 66 IN | BODY MASS INDEX: 23.95 KG/M2 | HEART RATE: 88 BPM | TEMPERATURE: 98 F | WEIGHT: 149.03 LBS

## 2022-07-20 DIAGNOSIS — C91.Z0 B LYMPHOBLASTIC LEUKEMIA WITH T(9;22)(Q34;Q11.2);BCR-ABL1 (HCC): ICD-10-CM

## 2022-07-20 DIAGNOSIS — Z01.89 ENCOUNTER FOR LUMBAR PUNCTURE: ICD-10-CM

## 2022-07-20 DIAGNOSIS — Z51.11 ENCOUNTER FOR ANTINEOPLASTIC CHEMOTHERAPY: ICD-10-CM

## 2022-07-20 DIAGNOSIS — C91.00 PHILADELPHIA CHROMOSOME POSITIVE ACUTE LYMPHOBLASTIC LEUKEMIA (HCC): ICD-10-CM

## 2022-07-20 DIAGNOSIS — D64.81 ANEMIA DUE TO ANTINEOPLASTIC CHEMOTHERAPY: ICD-10-CM

## 2022-07-20 DIAGNOSIS — T45.1X5A ANEMIA DUE TO ANTINEOPLASTIC CHEMOTHERAPY: ICD-10-CM

## 2022-07-20 LAB
BARCODED ABORH UBTYP: 5100
BARCODED ABORH UBTYP: 5100
BARCODED PRD CODE UBPRD: NORMAL
BARCODED PRD CODE UBPRD: NORMAL
BARCODED UNIT NUM UBUNT: NORMAL
BARCODED UNIT NUM UBUNT: NORMAL
COMPONENT R 8504R: NORMAL
COMPONENT R 8504R: NORMAL
PRODUCT TYPE UPROD: NORMAL
PRODUCT TYPE UPROD: NORMAL
UNIT STATUS USTAT: NORMAL
UNIT STATUS USTAT: NORMAL

## 2022-07-20 PROCEDURE — 36430 TRANSFUSION BLD/BLD COMPNT: CPT

## 2022-07-20 PROCEDURE — 86945 BLOOD PRODUCT/IRRADIATION: CPT

## 2022-07-20 PROCEDURE — 86923 COMPATIBILITY TEST ELECTRIC: CPT

## 2022-07-20 PROCEDURE — P9016 RBC LEUKOCYTES REDUCED: HCPCS | Mod: 91

## 2022-07-20 PROCEDURE — P9016 RBC LEUKOCYTES REDUCED: HCPCS

## 2022-07-20 PROCEDURE — 306780 HCHG STAT FOR TRANSFUSION PER CASE

## 2022-07-20 PROCEDURE — 700111 HCHG RX REV CODE 636 W/ 250 OVERRIDE (IP): Performed by: PEDIATRICS

## 2022-07-20 PROCEDURE — 86923 COMPATIBILITY TEST ELECTRIC: CPT | Mod: 91

## 2022-07-20 PROCEDURE — 99212 OFFICE O/P EST SF 10 MIN: CPT | Performed by: PEDIATRICS

## 2022-07-20 PROCEDURE — 86945 BLOOD PRODUCT/IRRADIATION: CPT | Mod: 91

## 2022-07-20 PROCEDURE — 96375 TX/PRO/DX INJ NEW DRUG ADDON: CPT

## 2022-07-20 PROCEDURE — 96409 CHEMO IV PUSH SNGL DRUG: CPT

## 2022-07-20 RX ORDER — DIPHENHYDRAMINE HYDROCHLORIDE 50 MG/ML
25 INJECTION INTRAMUSCULAR; INTRAVENOUS PRN
Status: CANCELLED | OUTPATIENT
Start: 2022-07-20

## 2022-07-20 RX ORDER — ONDANSETRON 2 MG/ML
8 INJECTION INTRAMUSCULAR; INTRAVENOUS ONCE
Status: COMPLETED | OUTPATIENT
Start: 2022-07-20 | End: 2022-07-20

## 2022-07-20 RX ORDER — ACETAMINOPHEN 325 MG/1
650 TABLET ORAL PRN
Status: CANCELLED | OUTPATIENT
Start: 2022-07-20

## 2022-07-20 RX ADMIN — CYTARABINE 130 MG: 20 INJECTION, SOLUTION INTRATHECAL; INTRAVENOUS; SUBCUTANEOUS at 10:01

## 2022-07-20 RX ADMIN — ONDANSETRON 8 MG: 2 INJECTION INTRAMUSCULAR; INTRAVENOUS at 09:57

## 2022-07-20 ASSESSMENT — FIBROSIS 4 INDEX: FIB4 SCORE: 3.3

## 2022-07-20 NOTE — PROGRESS NOTES
Pharmacy Chemotherapy Calculations    Patient Name: Tomas Jean-Baptiste      Diagnosis: Ph+ ALL     Induction IB, Days 16 - 19 (delayed by one day 15 - 18 per protocol below)     Regimen/Dosing Reference: Induction IB (OS) per [id number]       Allergies: Amoxicillin       There were no vitals taken for this visit. There is no height or weight on file to calculate BSA.     Weight Type Height Weight BSA Additional Details   Treatment plan 167.5 cm 64 kg 1.73 m² Documented weight as of 7/5/2022  7:41 AM; height as of 7/5/2022  7:41 AM     No hold parameters per treatment plan      Cytarabine (VERITO-C) 75 mg/m² x 1.77 m² = 132.75 mg              <10% difference, okay to treat with final dose = 130 mg IV     Kayla Juarez, XochiltD, BCPS

## 2022-07-20 NOTE — PROGRESS NOTES
"Pediatric Hematology / Oncology  Progress Note      Patient Name:  Tomas Jean-Baptiste  : 2000   MRN: [id number]    Location of Service:  [facility]  Date of Service: 2022  Time: 11:24 AM    Primary Care Physician: Margarito Arvizu M.D.    HISTORY OF PRESENT ILLNESS:     Chief Complaint: Chemotherapy and transfusion    History of Present Illness: Tomas Jean-Baptiste is a 21 y.o. male who presents to the [facility] for scheduled chemotherapy.  Today he presents for chemotherapy and transfusion.    No overnight acute events.  Feeling well this morning.  Fatigued.    Review of Systems:     Constitutional:  Afebrile. No remote or acute illness.  Energy and activity decreased  HENT: Negative.  Eyes: Negative.    Respiratory: Negative.  Cardiovascular: Negative for chest pain or extremity swelling.    Gastrointestinal: Negative for nausea, vomiting, abdominal pain, diarrhea, constipation.  Genitourinary: Negative.  Musculoskeletal: Negative.  Skin: Negative.  Neurological: Negative.  Endo/Heme/Allergies: Does not bruise/bleed easily.    Psychiatric/Behavioral: No changes in mood, appropriate for age.     PAST MEDICAL HISTORY:     Past medical history unchanged from yesterday.    OBJECTIVE:     Vitals:   /67   Pulse 99   Temp 36.3 °C (97.4 °F) (Temporal)   Resp 20   Ht 1.669 m (5' 5.71\")   Wt 67.6 kg (149 lb 0.5 oz)   SpO2 99%     Labs:    No new labs.    Physical Exam:    Constitutional: Well-developed, well-nourished, and in no distress.  Stable and unchanged.  HENT: Normocephalic and atraumatic. No nasal congestion or rhinorrhea.   Eyes: Conjunctivae are normal. Pupils are equal, round.  EOMI.  Nonicteric.  Eyepatch in place.  Neck: Normal range of motion of neck, no adenopathy.    Cardiovascular: Normal rate, regular rhythm and normal heart sounds.  No murmur heard. DP/radial pulses 2+, cap " refill < 2 sec.  Pulmonary/Chest: Effort normal and breath sounds normal. No respiratory distress. Symmetric expansion.  No crackles or wheezes.  Abdomen: Soft. Bowel sounds are normal. No distension and no mass. There is no hepatosplenomegaly.    Genitourinary:  Deferred.  Musculoskeletal: Normal range of motion of lower and upper extremities bilaterally.  Neurological: Alert and oriented to person and place. Exhibits normal muscle tone bilaterally in upper and lower extremities. Gait normal. Coordination normal.    Skin: Skin is warm, dry and pink.  No rash or evidence of skin infection.  Mild pallor.   Psychiatric: Mood and affect normal for age.    ASSESSMENT AND PLAN:     Day 16 cytarabine, continue with mercaptopurine as instructed, continue with imatinib as instructed  2 units irradiated PRBCs for hemoglobin 7.0    Return to clinic tomorrow for Day 17 cytarabine    Pepe Faye MD  Pediatric Hematology / Oncology  [facility]  Cell.  [phone]  Office. [phone]

## 2022-07-20 NOTE — PROGRESS NOTES
Pt to [facility] for doctors office visit, blood transfusion and chemotherapy administration.      Afebrile.  VSS.  Awake and alert in no acute distress.      PICC line accessed. Brisk blood return noted. Pt tolerated well.      Chemotherapy dosage calculated independently by Tammie Covington RN and Huong Foster RN and compared to road map for protocol [id number].  Calculations within 10% of written order.  Lab results reviewed.      Premedications and chemo given as ordered, see MAR.  Blood return verified prior to, during, and after chemotherapy infusion.  See Chemotherapy flowsheet.  PT tolerated well.  No side effects or complications noted.     PRBC: Donor # [id number] started at 1011 and stopped at 1222.    Total volume infused:432    PRBC: Donor # [id number] started at 1227 and stopped at 1445.    Total volume infused: 480    Vital signs monitored per protocol. PT tolerated well.  PICC line flushed after completion of transfusion. Follow up instructions given.  Home with self in stable condition.  Next appointment scheduled on 7/21/22.

## 2022-07-21 ENCOUNTER — HOSPITAL ENCOUNTER (OUTPATIENT)
Dept: INFUSION CENTER | Facility: MEDICAL CENTER | Age: 22
End: 2022-07-21
Attending: PEDIATRICS
Payer: COMMERCIAL

## 2022-07-21 ENCOUNTER — PATIENT OUTREACH (OUTPATIENT)
Dept: PEDIATRIC HEMATOLOGY/ONCOLOGY | Facility: OUTPATIENT CENTER | Age: 22
End: 2022-07-21

## 2022-07-21 VITALS
BODY MASS INDEX: 24.06 KG/M2 | HEIGHT: 66 IN | RESPIRATION RATE: 20 BRPM | SYSTOLIC BLOOD PRESSURE: 101 MMHG | HEART RATE: 88 BPM | TEMPERATURE: 97.3 F | DIASTOLIC BLOOD PRESSURE: 63 MMHG | OXYGEN SATURATION: 98 % | WEIGHT: 149.69 LBS

## 2022-07-21 DIAGNOSIS — C91.Z0 B LYMPHOBLASTIC LEUKEMIA WITH T(9;22)(Q34;Q11.2);BCR-ABL1 (HCC): ICD-10-CM

## 2022-07-21 DIAGNOSIS — Z51.11 ENCOUNTER FOR ANTINEOPLASTIC CHEMOTHERAPY: ICD-10-CM

## 2022-07-21 DIAGNOSIS — C91.00 PHILADELPHIA CHROMOSOME POSITIVE ACUTE LYMPHOBLASTIC LEUKEMIA (HCC): ICD-10-CM

## 2022-07-21 DIAGNOSIS — Z01.89 ENCOUNTER FOR LUMBAR PUNCTURE: ICD-10-CM

## 2022-07-21 LAB
ERYTHROCYTE [DISTWIDTH] IN BLOOD BY AUTOMATED COUNT: 54.5 FL (ref 35.9–50)
HCT VFR BLD AUTO: 27.6 % (ref 42–52)
HGB BLD-MCNC: 9.1 G/DL (ref 14–18)
MCH RBC QN AUTO: 29.4 PG (ref 27–33)
MCHC RBC AUTO-ENTMCNC: 33 G/DL (ref 33.7–35.3)
MCV RBC AUTO: 89.3 FL (ref 81.4–97.8)
PLATELET # BLD AUTO: 17 K/UL (ref 164–446)
PMV BLD AUTO: 8.6 FL (ref 9–12.9)
RBC # BLD AUTO: 3.09 M/UL (ref 4.7–6.1)
WBC # BLD AUTO: 0.9 K/UL (ref 4.8–10.8)

## 2022-07-21 PROCEDURE — 85027 COMPLETE CBC AUTOMATED: CPT

## 2022-07-21 PROCEDURE — 36592 COLLECT BLOOD FROM PICC: CPT

## 2022-07-21 PROCEDURE — 96409 CHEMO IV PUSH SNGL DRUG: CPT

## 2022-07-21 PROCEDURE — 99212 OFFICE O/P EST SF 10 MIN: CPT | Performed by: PEDIATRICS

## 2022-07-21 PROCEDURE — 700111 HCHG RX REV CODE 636 W/ 250 OVERRIDE (IP): Performed by: PEDIATRICS

## 2022-07-21 RX ORDER — ONDANSETRON 2 MG/ML
8 INJECTION INTRAMUSCULAR; INTRAVENOUS EVERY 8 HOURS PRN
Status: DISCONTINUED | OUTPATIENT
Start: 2022-07-21 | End: 2022-07-22 | Stop reason: HOSPADM

## 2022-07-21 RX ORDER — LORAZEPAM 2 MG/ML
1 INJECTION INTRAMUSCULAR EVERY 6 HOURS PRN
Status: DISCONTINUED | OUTPATIENT
Start: 2022-07-21 | End: 2022-07-22 | Stop reason: HOSPADM

## 2022-07-21 RX ADMIN — CYTARABINE 130 MG: 20 INJECTION, SOLUTION INTRATHECAL; INTRAVENOUS; SUBCUTANEOUS at 10:25

## 2022-07-21 ASSESSMENT — FIBROSIS 4 INDEX: FIB4 SCORE: 3.3

## 2022-07-21 NOTE — PROGRESS NOTES
"Pharmacy Chemotherapy Calculations    Patient Name: Tomas Jean-Baptiste      Diagnosis: Ph+ ALL     Regimen/Dosing Reference: Induction IB (OS) per [id number]       Allergies: Amoxicillin       /70   Pulse 82   Temp 36.4 °C (97.6 °F) (Temporal)   Resp 20   Ht 1.669 m (5' 5.71\")   Wt 68.1 kg (150 lb 2.1 oz)   SpO2 98%   BMI 24.45 kg/m²  Body surface area is 1.78 meters squared.     Weight Type Height Weight BSA Additional Details   Treatment plan 167.5 cm 64 kg 1.73 m² Documented weight as of 7/5/2022  7:41 AM; height as of 7/5/2022  7:41 AM     MD aware of plts of 17 k on 7/21/22, no hold parameters for today's chemotherapy. Plan for plts on 7/25/22.      Drug Order   (Drug name, dose, route, IV Fluid & volume, frequency, number of doses) Induction IB, Day 18  Previous treatment: Day 4 on 7/8/22     Medication = Cytarabine (VERITO-C)  Base Dose = 75 mg/m2  Calc Dose: Base Dose x 1.78 m2 = 133.5 mg  Final Dose = 130 mg  Route = IV  Fluid & Volume = 6.5 mL chemo in syringe   Admin Duration = Over 5 minutes    Days 15-18       <10% difference, OK to treat with final dose     By my signature below, I confirm this process was performed independently with the BSA and all final chemotherapy dosing calculations congruent. I have reviewed the above chemotherapy order and that my calculation of the final dose and BSA (when applicable) corroborate those calculations of the  pharmacist. Discrepancies of 10% or greater in the written dose have been addressed and documented within the [facility] Progress notes.    Marifer Torres, PharmD, BCOP             "

## 2022-07-21 NOTE — PROGRESS NOTES
"Pharmacy Chemotherapy Calculations    Patient Name: Tomas Jean-Baptiste      Diagnosis: Ph+ ALL     Regimen/Dosing Reference: Induction IB (OS) per [id number]       Allergies: Amoxicillin       BP (!) 99/62   Pulse 96   Temp 36.3 °C (97.3 °F) (Temporal)   Resp 18   Ht 1.669 m (5' 5.71\")   Wt 67.9 kg (149 lb 11.1 oz)   SpO2 98%   BMI 24.38 kg/m²  Body surface area is 1.77 meters squared.     Weight Type Height Weight BSA Additional Details   Treatment plan 167.5 cm 64 kg 1.73 m² Documented weight as of 7/5/2022  7:41 AM; height as of 7/5/2022  7:41 AM     Labs from 7/19/22 reviewed - MD aware of low hgb and plts, received blood 7/19 prior to chemo. Okay to proceed with Days 15-18     Drug Order   (Drug name, dose, route, IV Fluid & volume, frequency, number of doses) Induction IB, Day 17     Previous treatment: Day 4 on 7/8/22     Medication = Cytarabine (VERITO-C)  Base Dose = 75 mg/m2  Calc Dose: Base Dose x 1.77 m2 = 132.75 mg  Final Dose = 130 mg  Route = IV  Fluid & Volume = 6.5 mL chemo in syringe   Admin Duration = Over 5 minutes    Days 15-18       <10% difference, OK to treat with final dose     By my signature below, I confirm this process was performed independently with the BSA and all final chemotherapy dosing calculations congruent. I have reviewed the above chemotherapy order and that my calculation of the final dose and BSA (when applicable) corroborate those calculations of the  pharmacist. Discrepancies of 10% or greater in the written dose have been addressed and documented within the [facility] Progress notes.    Marifer Torres, PharmD, BCOP             "

## 2022-07-21 NOTE — PROGRESS NOTES
Kami from Lab called with critical result of WBC 0.9, Plt 17 at 1122. Critical lab result read back to Kami.   Dr. Faye notified of critical lab result at 1122.  Critical lab result read back by Dr. Mcclure.

## 2022-07-21 NOTE — PROGRESS NOTES
Pt to [facility] for chemotherapy administration.      Afebrile.  VSS.  Awake and alert in no acute distress.      GRACIELA PICC in place on arrival. Dressing CDI. Flushes easily with brisk blood return. No s/s infection noted.  Labs drawn from the PICC without difficulty.   Pt tolerated well.      Chemotherapy dosage calculated independently by Ginger ELIZABETH RN and Dayna HERZOG RN and compared to road map for protocol COG [id number].  Calculations within 10% of written order.  Lab results reviewed.      Premedications not indicated at this time, patient took home dose prior to arrival, and chemo given as ordered, see MAR.  Blood return verified prior to, during, and after chemotherapy infusion.  See Chemotherapy flowsheet.  PT tolerated well.  No side effects or complications noted.  PICC flushed at completion. PT home with self.  Will return for next visit on 07/21/22.

## 2022-07-21 NOTE — PROGRESS NOTES
"Pediatric Hematology / Oncology  Progress Note      Patient Name:  Tomas Jean-Baptiste  : 2000   MRN: [id number]    Location of Service:  [facility]  Date of Service: 2022  Time: 10:26 AM    Primary Care Physician: Margarito Arvizu M.D.    HISTORY OF PRESENT ILLNESS:     Chief Complaint: Scheduled chemotherapy    History of Present Illness: Tomas Jean-Baptiste is a 21 y.o. male who presents to the [facility] for scheduled chemotherapy.  Today is Day 17 of Induction IB on study [id number] with cytarabine only.    Interval history (overnight) remarkable for significant improvement in energy and overall clinical vigor.   Tomas Roy reports that his headaches almost instantaneously resolved upon getting blood.  His energy has improved considerably as well.  Overall mood has improved greatly as well.  No other concerns or complaints at this time.    Review of Systems:     Constitutional:  Afebrile. No remote or acute illness.  Energy and activity are greatly improved.  HENT: Negative.  Eyes: Negative for visual changes.  Respiratory: Negative.  Cardiovascular: Negative.  Gastrointestinal: Negative.  Genitourinary: Negative.  Musculoskeletal: Negative.  Skin: Negative.  Neurological: Negative.  Endo/Heme/Allergies: Does not bruise/bleed easily.    Psychiatric/Behavioral: Improved mood.    PAST MEDICAL HISTORY:     Past medical history unchanged overnight.    OBJECTIVE:     Vitals:   BP (!) 99/62   Pulse 96   Temp 36.3 °C (97.3 °F) (Temporal)   Resp 18   Ht 1.669 m (5' 5.71\")   Wt 67.9 kg (149 lb 11.1 oz)   SpO2 98%     Labs:    CBC without differential PENDING to determine platelet transfusion needs.    Physical Exam:    Constitutional: Well-developed, well-nourished, and in no distress.  Very well-appearing.  HENT: Normocephalic and atraumatic. No nasal congestion or rhinorrhea. Oropharynx is clear " and moist. No oral ulcerations or sores.    Eyes: Conjunctivae are normal. Pupils are equal, round.  EOMI.  Nonicteric.  Neck: Normal range of motion of neck, no adenopathy.    Cardiovascular: Normal rate, regular rhythm and normal heart sounds.  No murmur heard. DP/radial pulses 2+, cap refill < 2 sec.  Pulmonary/Chest: Effort normal and breath sounds normal. No respiratory distress. Symmetric expansion.  No crackles or wheezes.  Abdomen: Soft. Bowel sounds are normal. No distension and no mass. There is no hepatosplenomegaly.    Genitourinary:  Deferred.  Musculoskeletal: Normal range of motion of lower and upper extremities bilaterally.   Neurological: Alert and oriented to person and place. Exhibits normal muscle tone bilaterally in upper and lower extremities. Gait normal. Coordination normal.    Skin: Skin is warm, dry and pink.  No rash or evidence of skin infection.  No pallor.   Psychiatric: Mood and affect normal for age.    ASSESSMENT AND PLAN:     Ph+ B-ALL - Day 17 cytarabine today  Pancytopenia - clinically improved following PRBC yesterday  Repeat CBC today to evaluate platelet transfusion needs    100% compliant with imatinib 600 mg PO daily  100% compliant with mercaptopurine 100 mg Tues-Sun and 125 mg Mon    RTC tomorrow for Day 18 cytarabine and possible platelet transfusion    Pepe Faye MD  Pediatric Hematology / Oncology  [facility]  Cell.  [phone]  Office. [phone]

## 2022-07-22 ENCOUNTER — HOSPITAL ENCOUNTER (OUTPATIENT)
Dept: INFUSION CENTER | Facility: MEDICAL CENTER | Age: 22
End: 2022-07-22
Attending: PEDIATRICS
Payer: COMMERCIAL

## 2022-07-22 VITALS
DIASTOLIC BLOOD PRESSURE: 70 MMHG | TEMPERATURE: 97.6 F | BODY MASS INDEX: 24.13 KG/M2 | SYSTOLIC BLOOD PRESSURE: 115 MMHG | WEIGHT: 150.13 LBS | OXYGEN SATURATION: 98 % | RESPIRATION RATE: 20 BRPM | HEART RATE: 82 BPM | HEIGHT: 66 IN

## 2022-07-22 DIAGNOSIS — C91.00 PRE B-CELL ACUTE LYMPHOBLASTIC LEUKEMIA (ALL) (HCC): ICD-10-CM

## 2022-07-22 DIAGNOSIS — Z01.89 ENCOUNTER FOR LUMBAR PUNCTURE: ICD-10-CM

## 2022-07-22 DIAGNOSIS — C91.00 PHILADELPHIA CHROMOSOME POSITIVE ACUTE LYMPHOBLASTIC LEUKEMIA (HCC): ICD-10-CM

## 2022-07-22 DIAGNOSIS — Z51.11 ENCOUNTER FOR ANTINEOPLASTIC CHEMOTHERAPY: ICD-10-CM

## 2022-07-22 DIAGNOSIS — C91.Z0 B LYMPHOBLASTIC LEUKEMIA WITH T(9;22)(Q34;Q11.2);BCR-ABL1 (HCC): ICD-10-CM

## 2022-07-22 PROCEDURE — 99214 OFFICE O/P EST MOD 30 MIN: CPT | Performed by: PEDIATRICS

## 2022-07-22 PROCEDURE — 700111 HCHG RX REV CODE 636 W/ 250 OVERRIDE (IP): Performed by: PEDIATRICS

## 2022-07-22 PROCEDURE — 96409 CHEMO IV PUSH SNGL DRUG: CPT

## 2022-07-22 RX ADMIN — CYTARABINE 130 MG: 20 INJECTION, SOLUTION INTRATHECAL; INTRAVENOUS; SUBCUTANEOUS at 09:55

## 2022-07-22 ASSESSMENT — FIBROSIS 4 INDEX: FIB4 SCORE: 5.05

## 2022-07-22 NOTE — ADDENDUM NOTE
Encounter addended by: Pepe Faye M.D. on: 7/22/2022 4:01 PM   Actions taken: Clinical Note Signed, Level of Service modified

## 2022-07-22 NOTE — PROGRESS NOTES
Pt to [facility] for doctors office visit and chemotherapy administration.      Afebrile.  VSS.  Awake and alert in no acute distress.      Office visit with Dr. Neyda rhoades. Okay for chemotherapy today.     Right upper arm PICC line in place. Dressing clean, dry, and intact. PICC flushes well with brisk blood return.     Chemotherapy dosage calculated independently by Tammie Covington RN and Cherrie Urbano RN and compared to road map for protocol [id number].  Calculations within 10% of written order.  Lab results reviewed.      Pt took Zofran prior to arrival. Chemo given as ordered, see MAR.  Blood return verified prior to and after chemotherapy infusion.  See Chemotherapy flowsheet.  PT tolerated well.  No side effects or complications noted.  PICC line flushed with NS. PT home with self.  Will return for next visit on 7/26/22.

## 2022-07-22 NOTE — PROGRESS NOTES
"Pediatric Hematology / Oncology  Progress Note      Patient Name:  Tomas Jean-Baptiste  : 2000   MRN: [id number]    Location of Service:  [facility]  Date of Service: 2022  Time: 3:56 PM    Primary Care Physician: Margarito Arvizu M.D.      HISTORY OF PRESENT ILLNESS:     Chief Complaint: Scheduled chemotherapy    History of Present Illness: Tomas Jean-Baptiste is a 21 y.o. male who presents to the [facility] for scheduled chemotherapy.  Today is Day 18 of Induction IB on study [id number].   Tomas Roy presents by himself for cytarabine only.    No significant overnight interval history. Tomas Roy continues to feel well.  No bruising, no bleeding.  Mood remains good.  100% compliant with imatinib and 6-MP.    No other concerns or complaints.    Review of Systems:     Constitutional:  Afebrile. No remote or acute illness.  Energy and activity much improved following blood transfusion.    HENT: Negative.  Eyes: Negative.  Respiratory: Negative.  Cardiovascular: Negative.  Gastrointestinal: Negative.  Genitourinary: Negative.  Musculoskeletal: Negative.  Skin: Negative for rash, signs of infection.  Neurological: Negative for numbness, tingling, sensory changes, weakness or headaches.    Endo/Heme/Allergies: Does not bruise/bleed easily.    Psychiatric/Behavioral: No changes in mood, appropriate for age.     PAST MEDICAL HISTORY:     No changes to medical history overnight.    OBJECTIVE:     Vitals:   /70   Pulse 82   Temp 36.4 °C (97.6 °F) (Temporal)   Resp 20   Ht 1.669 m (5' 5.71\")   Wt 68.1 kg (150 lb 2.1 oz)   SpO2 98%     Labs:    No new labs    Physical Exam:    Constitutional: Well-developed, well-nourished, and in no distress.  Very well-appearing.  HENT: Normocephalic and atraumatic. No nasal congestion or rhinorrhea.   Eyes: Conjunctivae are normal. Pupils are equal, " round.  EOMI.  Nonicteric.  Neck: Normal range of motion of neck, no adenopathy.    Cardiovascular: Normal rate, regular rhythm and normal heart sounds.  No murmur heard. DP/radial pulses 2+, cap refill < 2 sec.  Pulmonary/Chest: Effort normal and breath sounds normal. No respiratory distress. Symmetric expansion.  No crackles or wheezes.  Abdomen: Soft. Bowel sounds are normal. No distension and no mass. There is no hepatosplenomegaly.    Genitourinary:  Deferred.  Musculoskeletal: Normal range of motion of lower and upper extremities bilaterally. No tenderness to palpation of elbows, wriwts, hands, knees, ankles and feet bilaterally.   Lymphadenopathy: No cervical adenopathy, axillary adenopathy or inguinal adenopathy.   Neurological: Alert and oriented to person and place. Exhibits normal muscle tone bilaterally in upper and lower extremities. Gait normal. Coordination normal.    Skin: Skin is warm, dry and pink.  No rash or evidence of skin infection.  No pallor.   Psychiatric: Mood and affect normal for age.      ASSESSMENT AND PLAN:     Ph-positive B-ALL Day 18 cytarabine on study [id number], Induction IB  Cytarabine 130 mg IV x1  Asymptomatic thrombocytopenia, plan for transfusion of platelets on Monday prior to lumbar puncture on Tuesday of next week    100% compliant with imatinib 600 mgPO daily  100% compliant with mercaptopurine 100 mg Tues-Sun and 125 mg Mon    Return to clinic on Monday 7/25 for transfusional needs, return on 7/26 for scheduled therapy with lumbar puncture    Pepe Faye MD  Pediatric Hematology / Oncology  [facility]  Cell.  [phone]  [facility]. [phone]

## 2022-07-25 ENCOUNTER — HOSPITAL ENCOUNTER (OUTPATIENT)
Dept: INFUSION CENTER | Facility: MEDICAL CENTER | Age: 22
End: 2022-07-25
Attending: PEDIATRICS
Payer: COMMERCIAL

## 2022-07-25 ENCOUNTER — OFFICE VISIT (OUTPATIENT)
Dept: OPHTHALMOLOGY | Facility: MEDICAL CENTER | Age: 22
End: 2022-07-25
Payer: COMMERCIAL

## 2022-07-25 VITALS
SYSTOLIC BLOOD PRESSURE: 111 MMHG | DIASTOLIC BLOOD PRESSURE: 70 MMHG | HEART RATE: 80 BPM | WEIGHT: 149.03 LBS | BODY MASS INDEX: 24.83 KG/M2 | RESPIRATION RATE: 20 BRPM | TEMPERATURE: 98.7 F | HEIGHT: 65 IN | OXYGEN SATURATION: 100 %

## 2022-07-25 DIAGNOSIS — H52.13 MYOPIA OF BOTH EYES: ICD-10-CM

## 2022-07-25 DIAGNOSIS — D69.6 THROMBOCYTOPENIA (HCC): ICD-10-CM

## 2022-07-25 DIAGNOSIS — H49.22 LEFT ABDUCENS NERVE PALSY: ICD-10-CM

## 2022-07-25 DIAGNOSIS — D64.81 ANEMIA DUE TO ANTINEOPLASTIC CHEMOTHERAPY: ICD-10-CM

## 2022-07-25 DIAGNOSIS — H46.9 OPTIC NEUROPATHY: ICD-10-CM

## 2022-07-25 DIAGNOSIS — T45.1X5A ANEMIA DUE TO ANTINEOPLASTIC CHEMOTHERAPY: ICD-10-CM

## 2022-07-25 LAB
BARCODED ABORH UBTYP: 6200
BARCODED PRD CODE UBPRD: NORMAL
BARCODED UNIT NUM UBUNT: NORMAL
BASOPHILS # BLD AUTO: 0 % (ref 0–1.8)
BASOPHILS # BLD: 0 K/UL (ref 0–0.12)
COMPONENT P 8504P: NORMAL
EOSINOPHIL # BLD AUTO: 0 K/UL (ref 0–0.51)
EOSINOPHIL NFR BLD: 0 % (ref 0–6.9)
ERYTHROCYTE [DISTWIDTH] IN BLOOD BY AUTOMATED COUNT: 51.8 FL (ref 35.9–50)
HCT VFR BLD AUTO: 25.8 % (ref 42–52)
HGB BLD-MCNC: 8.3 G/DL (ref 14–18)
LYMPHOCYTES # BLD AUTO: 0.33 K/UL (ref 1–4.8)
LYMPHOCYTES NFR BLD: 81.9 % (ref 22–41)
MANUAL DIFF BLD: NORMAL
MCH RBC QN AUTO: 29.5 PG (ref 27–33)
MCHC RBC AUTO-ENTMCNC: 32.2 G/DL (ref 33.7–35.3)
MCV RBC AUTO: 91.8 FL (ref 81.4–97.8)
MONOCYTES # BLD AUTO: 0.02 K/UL (ref 0–0.85)
MONOCYTES NFR BLD AUTO: 4.2 % (ref 0–13.4)
MORPHOLOGY BLD-IMP: NORMAL
NEUTROPHILS # BLD AUTO: 0.06 K/UL (ref 1.82–7.42)
NEUTROPHILS NFR BLD: 13.9 % (ref 44–72)
NRBC # BLD AUTO: 0 K/UL
NRBC BLD-RTO: 0 /100 WBC
PLATELET # BLD AUTO: 28 K/UL (ref 164–446)
PLATELET BLD QL SMEAR: NORMAL
PMV BLD AUTO: 8.2 FL (ref 9–12.9)
PRODUCT TYPE UPROD: NORMAL
RBC # BLD AUTO: 2.81 M/UL (ref 4.7–6.1)
RBC BLD AUTO: NORMAL
UNIT STATUS USTAT: NORMAL
WBC # BLD AUTO: 0.4 K/UL (ref 4.8–10.8)

## 2022-07-25 PROCEDURE — V2718 FRESNELL PRISM PRESS-ON LENS: HCPCS | Performed by: OPHTHALMOLOGY

## 2022-07-25 PROCEDURE — 306780 HCHG STAT FOR TRANSFUSION PER CASE

## 2022-07-25 PROCEDURE — 85025 COMPLETE CBC W/AUTO DIFF WBC: CPT

## 2022-07-25 PROCEDURE — 99213 OFFICE O/P EST LOW 20 MIN: CPT | Mod: 25 | Performed by: OPHTHALMOLOGY

## 2022-07-25 PROCEDURE — 36430 TRANSFUSION BLD/BLD COMPNT: CPT

## 2022-07-25 PROCEDURE — 92250 FUNDUS PHOTOGRAPHY W/I&R: CPT | Performed by: OPHTHALMOLOGY

## 2022-07-25 PROCEDURE — 92060 SENSORIMOTOR EXAMINATION: CPT | Performed by: OPHTHALMOLOGY

## 2022-07-25 PROCEDURE — P9034 PLATELETS, PHERESIS: HCPCS

## 2022-07-25 PROCEDURE — 85007 BL SMEAR W/DIFF WBC COUNT: CPT

## 2022-07-25 PROCEDURE — 86945 BLOOD PRODUCT/IRRADIATION: CPT

## 2022-07-25 PROCEDURE — 36592 COLLECT BLOOD FROM PICC: CPT

## 2022-07-25 RX ORDER — DIPHENHYDRAMINE HYDROCHLORIDE 50 MG/ML
25 INJECTION INTRAMUSCULAR; INTRAVENOUS PRN
Status: CANCELLED | OUTPATIENT
Start: 2022-07-25

## 2022-07-25 RX ORDER — ACETAMINOPHEN 325 MG/1
650 TABLET ORAL PRN
Status: CANCELLED | OUTPATIENT
Start: 2022-07-25

## 2022-07-25 RX ORDER — IMATINIB MESYLATE 100 MG/1
200 TABLET, FILM COATED ORAL DAILY
Status: ON HOLD | COMMUNITY
Start: 2022-07-16 | End: 2022-10-02

## 2022-07-25 RX ORDER — IMATINIB MESYLATE 400 MG/1
400 TABLET, FILM COATED ORAL EVERY MORNING
COMMUNITY
Start: 2022-07-16 | End: 2022-10-24

## 2022-07-25 ASSESSMENT — ENCOUNTER SYMPTOMS
BLURRED VISION: 1
PHOTOPHOBIA: 1
DOUBLE VISION: 1

## 2022-07-25 ASSESSMENT — REFRACTION_MANIFEST
OD_CYLINDER: +3.50
OD_AXIS: 092
OS_AXIS: 080
OD_SPHERE: -7.75
OS_CYLINDER: +3.00
OS_SPHERE: -7.75
METHOD_AUTOREFRACTION: 1

## 2022-07-25 ASSESSMENT — REFRACTION_WEARINGRX
OS_CYLINDER: +2.50
SPECS_TYPE: SVL
OD_AXIS: 094
OD_CYLINDER: +2.75
OS_SPHERE: -7.50
OD_SPHERE: -7.25
OS_AXIS: 087

## 2022-07-25 ASSESSMENT — CUP TO DISC RATIO
OS_RATIO: 0.3
OD_RATIO: 0.3

## 2022-07-25 ASSESSMENT — VISUAL ACUITY
OS_CC: 20/20
METHOD: SNELLEN - LINEAR
CORRECTION_TYPE: GLASSES
OD_CC: 20/20

## 2022-07-25 ASSESSMENT — TONOMETRY
OD_IOP_MMHG: 18
OS_IOP_MMHG: 17
IOP_METHOD: I-CARE

## 2022-07-25 ASSESSMENT — CONF VISUAL FIELD
OS_NORMAL: 1
OD_NORMAL: 1

## 2022-07-25 ASSESSMENT — FIBROSIS 4 INDEX: FIB4 SCORE: 5.05

## 2022-07-25 ASSESSMENT — SLIT LAMP EXAM - LIDS
COMMENTS: NORMAL
COMMENTS: NORMAL

## 2022-07-25 ASSESSMENT — EXTERNAL EXAM - RIGHT EYE: OD_EXAM: NORMAL

## 2022-07-25 ASSESSMENT — EXTERNAL EXAM - LEFT EYE: OS_EXAM: NORMAL

## 2022-07-25 NOTE — PROCEDURES
Intraretinal, subretinal, flame hemorrhages and some white centered hemorrhages OU. Occasional cotton wool spots. Not involving macula

## 2022-07-25 NOTE — PROGRESS NOTES
Peds/Neuro Ophthalmology:   Ephraim Carranza M.D.    Date & Time note created:    7/25/2022   12:28 PM     Referring MD / APRN:  Margarito Arvizu M.D., No att. providers found    Patient ID:  Name:             Tomas Jean-Baptiste   YOB: 2000  Age:                 21 y.o.  male   MRN:               [id number]    Chief Complaint/Reason for Visit:     Other (New patient evaluation for 6th Nerve Palsy)      History of Present Illness:    JULY Jean-Baptiste is a 21 y.o. male   New patient for 6th nerve palsy. Pt states vision has changed since last month in both eyes x 2 months ago. Pt is not able to wear normal glasses all day has to uses older glasses to get by. Pt has been patching OS to not see double. Pt see double side by side. He has also noticed in the center of the right eye there is a blurry spot. Pt does get watery eyes PRN OU.       Review of Systems:  Review of Systems   Eyes: Positive for blurred vision, double vision and photophobia.        Blurry spot in the center of OD  Watery eyes OU   Cardiovascular: Positive for chest pain.   All other systems reviewed and are negative.      Past Medical History:   Past Medical History:   Diagnosis Date   • Cancer (HCC)    • Leukoma        Past Surgical History:  History reviewed. No pertinent surgical history.    Current Outpatient Medications:  Current Outpatient Medications   Medication Sig Dispense Refill   • imatinib (GLEEVEC) 400 MG tablet Take 600 mg by mouth every day. Take 600 mg by mouth every day (1 x 400 mg and 2 x 100 mg tablets)     • imatinib (GLEEVEC) 100 MG tablet Take 600 mg by mouth every day. Take 600 mg by mouth every day (1 x 400 mg and 2 x 100 mg tablets)     • vitamin D3 (CHOLECALCIFEROL) 1000 Unit (25 mcg) Tab Take 1,000 Units by mouth every day.     • mercaptopurine (PURINETHOL) 50 MG Tab Take 2 tablets (100 mg) daily Tue - Sun and 2.5 tablets (125 mg) on Mondays only.  Continue for 28 days total. 58 Tablet 0   •  Sodium Chloride Flush (NORMAL SALINE FLUSH) 0.9 % Solution Infuse 10 mL into a venous catheter every 12 hours. (Patient taking differently: Infuse 5 mL into a venous catheter every 12 hours.) 600 mL 0   • sulfamethoxazole-trimethoprim (BACTRIM) 400-80 MG Tab Take 2 tablets by mouth twice daily every Saturday and Sunday 40 Tablet 11   • ondansetron (ZOFRAN ODT) 8 MG TABLET DISPERSIBLE Dissovle 1 Tablet by mouth every 8 hours as needed for Nausea. 20 Tablet 0   • polyethylene glycol/lytes (MIRALAX) 17 g Pack Mix 1 Packet per package instructions and drink by mouth 1 time a day as needed (if sennosides and docusate ineffective after 24 hours). 10 Each 3   • famotidine (PEPCID) 20 MG Tab Take 1 Tablet by mouth 2 times a day. 60 Tablet 1   • ascorbic acid (ASCORBIC ACID) 500 MG Tab Take 500 mg by mouth every day.     • therapeutic multivitamin-minerals (THERAGRAN-M) Tab Take 1 Tab by mouth every day.       No current facility-administered medications for this visit.       Allergies:  Allergies   Allergen Reactions   • Amoxicillin Rash     Reacted as an infant. No swelling or airway problems.       Family History:  Family History   Problem Relation Age of Onset   • No Known Problems Mother    • Stroke Maternal Grandmother    • Diabetes Paternal Grandfather    • Hypertension Paternal Grandfather    • Hyperlipidemia Paternal Grandfather    • Cancer Neg Hx    • Heart Disease Neg Hx        Social History:  Social History     Socioeconomic History   • Marital status: Single     Spouse name: Not on file   • Number of children: Not on file   • Years of education: Not on file   • Highest education level: Not on file   Occupational History   • Not on file   Tobacco Use   • Smoking status: Never Smoker   • Smokeless tobacco: Never Used   Vaping Use   • Vaping Use: Never used   Substance and Sexual Activity   • Alcohol use: Never   • Drug use: Never   • Sexual activity: Not Currently   Other Topics Concern   • Behavioral problems  Not Asked   • Interpersonal relationships Not Asked   • Sad or not enjoying activities Not Asked   • Suicidal thoughts Not Asked   • Poor school performance Not Asked   • Reading difficulties Not Asked   • Speech difficulties Not Asked   • Writing difficulties Not Asked   • Inadequate sleep Not Asked   • Excessive TV viewing Not Asked   • Excessive video game use Not Asked   • Inadequate exercise Not Asked   • Sports related Not Asked   • Poor diet Not Asked   • Family concerns for drug/alcohol abuse Not Asked   • Poor oral hygiene Not Asked   • Bike safety Not Asked   • Family concerns vehicle safety Not Asked   Social History Narrative   • Not on file     Social Determinants of Health     Financial Resource Strain: Not on file   Food Insecurity: Not on file   Transportation Needs: Not on file   Physical Activity: Not on file   Stress: Not on file   Social Connections: Not on file   Intimate Partner Violence: Not on file   Housing Stability: Not on file          Physical Exam:  Physical Exam    Oriented x 3  Weight/BMI: There is no height or weight on file to calculate BMI.  There were no vitals taken for this visit.    Base Eye Exam     Visual Acuity (Snellen - Linear)       Right Left    Dist cc 20/20 20/20    Dist ph cc NI NI    Correction: Glasses          Tonometry (i[facility]care, 9:34 AM)       Right Left    Pressure 18 17          Pupils       Pupils    Right PERRL    Left PERRL          Visual Fields       Right Left     Full Full          Neuro/Psych     Oriented x3: Yes    Mood/Affect: Normal          Dilation     Both eyes: able to view wihtout dilation             Additional Tests     Color       Right Left    Ishihara 12/12 12/12          Stereo     Fly: -    Animals: 3/3    Circles: 3/9            Strabismus Exam     Correction: cc    Distance Near Near +3DS N Bifocals                    0 0 0   0 0 0                      ET 14 0  0  ET 10 0  0  ET 14                     0 0 0   0 0 0                   Slit  Lamp and Fundus Exam     External Exam       Right Left    External Normal Normal          Slit Lamp Exam       Right Left    Lids/Lashes Normal Normal    Conjunctiva/Sclera White and quiet White and quiet    Cornea Clear Clear    Anterior Chamber Deep and quiet Deep and quiet    Iris Round and reactive Round and reactive    Lens Clear Clear    Vitreous Normal Normal          Fundus Exam       Right Left    Disc Normal Normal    C/D Ratio 0.3 0.3    Macula Normal Normal    Vessels Normal Normal    Periphery Hemorrhage, Cotton wool spots Hemorrhage, Cotton wool spots            Refraction     Wearing Rx       Sphere Cylinder Axis    Right -7.25 +2.75 094    Left -7.50 +2.50 087    Age: 2yr    Type: SVL          Manifest Refraction (Auto)       Sphere Cylinder Axis    Right -7.75 +3.50 092    Left -7.75 +3.00 080                Pertinent Lab/Test/Imaging Review:      Assessment and Plan:     Left abducens nerve palsy  6/3/2022 - 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 Blurry vision secondary to asthenopia from overlapping images and acuity improves under monocular conditions. 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 Less likely infectious process given no papilledema, headaches or other meningeal signs. Could also be some form of ischemic process if BP fluctuations during induction. Recommend MRI orbits with citlali and repeat LP. Recommend that he could wear eye patch.   7/25/2022 - Has almost full abduction of the left eye, but has a relatively comitant esotropia. Uncertain if this is as the nerve heals, or being left with a comitant strabismus. In PAT did well wih a 12 base out prism OS. Will re-eval in 4 to 6 weeks and if stable will grind in or if improved will taper the prism    Myopia of both eyes  6/3/64156 0- high myopia. Has old glasses and under monocular testing vision improves near baseline. No apparent retinal abnormality  7/25/2022 - continue current rx    Thrombocytopenia (HCC)  7/15/2022 - Today  has evidence of intraretinal flame and dot hemorrhages, white centered hemorrhages and some cotton wool spots. No nerve involvement and OCT NFL thickness normal at 100 OD and 98 OS. Hemorrhages not involving macula.         Ephraim Carranza M.D.

## 2022-07-25 NOTE — ASSESSMENT & PLAN NOTE
7/15/2022 - Today has evidence of intraretinal flame and dot hemorrhages, white centered hemorrhages and some cotton wool spots. No nerve involvement and OCT NFL thickness normal at 100 OD and 98 OS. Hemorrhages not involving macula.

## 2022-07-25 NOTE — ASSESSMENT & PLAN NOTE
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 Blurry vision secondary to asthenopia from overlapping images and acuity improves under monocular conditions. 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 Less likely infectious process given no papilledema, headaches or other meningeal signs. Could also be some form of ischemic process if BP fluctuations during induction. Recommend MRI orbits with citlali and repeat LP. Recommend that he could wear eye patch.   7/25/2022 - Has almost full abduction of the left eye, but has a relatively comitant esotropia. Uncertain if this is as the nerve heals, or being left with a comitant strabismus. In PAT did well wih a 12 base out prism OS. Will re-eval in 4 to 6 weeks and if stable will grind in or if improved will taper the prism

## 2022-07-25 NOTE — ASSESSMENT & PLAN NOTE
6/3/04384 0- high myopia. Has old glasses and under monocular testing vision improves near baseline. No apparent retinal abnormality  7/25/2022 - continue current rx

## 2022-07-25 NOTE — PROGRESS NOTES
PT to [facility] for platelet transfusion. Afebrile.  VSS. PICC line accessed and labs drawn. PT tolerated well.     [facility] called with critical result of WBC of 0.4 at 1144. Critical lab result read back to [facility].   Dr. Faye notified of critical lab result at 1144.  Critical lab result read back by Dr. Faye.    [facility]on[facility] from [facility] called with critical result of ANC 0.06 at 1209. Critical lab result read back to Adonay.   Dr. Faye notified of critical lab result at 1209.  Critical lab result read back by Dr. Faye.    Platelet: Donor #  [id number] started at 1223    Total volume infused:250    Vital signs monitored per protocol.Transfusion completed at 1340 and PT tolerated well.  PICC line flushed. PICC line dressing changed. Skin intact with no s/s of infection. Follow up instructions given. Next appointment scheduled on 7/26/22.

## 2022-07-26 ENCOUNTER — HOSPITAL ENCOUNTER (OUTPATIENT)
Dept: INFUSION CENTER | Facility: MEDICAL CENTER | Age: 22
End: 2022-07-26
Attending: PEDIATRICS
Payer: COMMERCIAL

## 2022-07-26 VITALS
HEART RATE: 88 BPM | RESPIRATION RATE: 20 BRPM | WEIGHT: 151.46 LBS | DIASTOLIC BLOOD PRESSURE: 61 MMHG | HEIGHT: 66 IN | TEMPERATURE: 97.1 F | SYSTOLIC BLOOD PRESSURE: 105 MMHG | BODY MASS INDEX: 24.34 KG/M2 | OXYGEN SATURATION: 99 %

## 2022-07-26 DIAGNOSIS — Z01.89 ENCOUNTER FOR LUMBAR PUNCTURE: ICD-10-CM

## 2022-07-26 DIAGNOSIS — C91.00 PHILADELPHIA CHROMOSOME POSITIVE ACUTE LYMPHOBLASTIC LEUKEMIA (HCC): ICD-10-CM

## 2022-07-26 DIAGNOSIS — C91.Z0 B LYMPHOBLASTIC LEUKEMIA WITH T(9;22)(Q34;Q11.2);BCR-ABL1 (HCC): ICD-10-CM

## 2022-07-26 DIAGNOSIS — Z51.11 ENCOUNTER FOR ANTINEOPLASTIC CHEMOTHERAPY: ICD-10-CM

## 2022-07-26 LAB
ANISOCYTOSIS BLD QL SMEAR: ABNORMAL
BASOPHILS # BLD AUTO: 0 % (ref 0–1.8)
BASOPHILS # BLD: 0 K/UL (ref 0–0.12)
EOSINOPHIL # BLD AUTO: 0 K/UL (ref 0–0.51)
EOSINOPHIL NFR BLD: 0.9 % (ref 0–6.9)
ERYTHROCYTE [DISTWIDTH] IN BLOOD BY AUTOMATED COUNT: 51.3 FL (ref 35.9–50)
HCT VFR BLD AUTO: 24.2 % (ref 42–52)
HGB BLD-MCNC: 8 G/DL (ref 14–18)
LYMPHOCYTES # BLD AUTO: 0.33 K/UL (ref 1–4.8)
LYMPHOCYTES NFR BLD: 81.5 % (ref 22–41)
MANUAL DIFF BLD: NORMAL
MCH RBC QN AUTO: 30.2 PG (ref 27–33)
MCHC RBC AUTO-ENTMCNC: 33.1 G/DL (ref 33.7–35.3)
MCV RBC AUTO: 91.3 FL (ref 81.4–97.8)
MICROCYTES BLD QL SMEAR: ABNORMAL
MONOCYTES # BLD AUTO: 0.01 K/UL (ref 0–0.85)
MONOCYTES NFR BLD AUTO: 3.7 % (ref 0–13.4)
MORPHOLOGY BLD-IMP: NORMAL
NEUTROPHILS # BLD AUTO: 0.06 K/UL (ref 1.82–7.42)
NEUTROPHILS NFR BLD: 13.9 % (ref 44–72)
NRBC # BLD AUTO: 0 K/UL
NRBC BLD-RTO: 0 /100 WBC
PLATELET # BLD AUTO: 43 K/UL (ref 164–446)
PLATELET BLD QL SMEAR: NORMAL
PMV BLD AUTO: 9.8 FL (ref 9–12.9)
RBC # BLD AUTO: 2.65 M/UL (ref 4.7–6.1)
RBC BLD AUTO: PRESENT
WBC # BLD AUTO: 0.4 K/UL (ref 4.8–10.8)

## 2022-07-26 PROCEDURE — 36592 COLLECT BLOOD FROM PICC: CPT

## 2022-07-26 PROCEDURE — 85007 BL SMEAR W/DIFF WBC COUNT: CPT

## 2022-07-26 PROCEDURE — 700111 HCHG RX REV CODE 636 W/ 250 OVERRIDE (IP): Performed by: PEDIATRICS

## 2022-07-26 PROCEDURE — 99213 OFFICE O/P EST LOW 20 MIN: CPT | Performed by: PEDIATRICS

## 2022-07-26 PROCEDURE — 85025 COMPLETE CBC W/AUTO DIFF WBC: CPT

## 2022-07-26 RX ORDER — LIDOCAINE AND PRILOCAINE 25; 25 MG/G; MG/G
CREAM TOPICAL PRN
Status: CANCELLED | OUTPATIENT
Start: 2022-08-02

## 2022-07-26 RX ORDER — LIDOCAINE AND PRILOCAINE 25; 25 MG/G; MG/G
CREAM TOPICAL PRN
Status: DISCONTINUED | OUTPATIENT
Start: 2022-07-26 | End: 2022-07-27 | Stop reason: HOSPADM

## 2022-07-26 ASSESSMENT — FIBROSIS 4 INDEX: FIB4 SCORE: 3.07

## 2022-07-26 NOTE — PROGRESS NOTES
PT to [facility] for LP with IT methotrexate, accompanied by mother.      Afebrile.  VSS. PICC line accessed with brisk blood return. Labs sent. PT tolerated well.      Aut[facility] from [facility] called with critical result of WBC of 0.4 and ANC of 0.06 at 0838. Critical lab result read back to Autumn.   Dr. Faye notified of critical lab result at 0838.  Critical lab result read back by Dr. Faye.    Treatment delayed d/t ANC at 0.06. Pt will return next week.

## 2022-07-26 NOTE — H&P
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 Therapeutic Medications:  (verified 100% compliance)     Imatinib 600 mg PO daily (start 6/3/2022)   Mercaptopurine 100 mg PO Tues-Sun and 125 mg Mon (start 7/5/2022)     Evaluations / Study Labs (obtained 7/26/2022):     WBC 0.4, Hgb 8.0, platelets 43 (following transfusion yesterday)  ANC 60,       Therapy Given (7/26/2022):     ALL therapy HELD x 1 week with the exception of imatinib.  (HOLDING 6-MP)            Disposition: Return to clinic 1 week for reevaluation of counts and possible Day 22 therapy.     Pepe Faye MD  Pediatric Hematology / Oncology  [facility]  Cell.  [phone]  Office. [phone]

## 2022-07-27 ENCOUNTER — TELEPHONE (OUTPATIENT)
Dept: INFUSION CENTER | Facility: MEDICAL CENTER | Age: 22
End: 2022-07-27
Payer: COMMERCIAL

## 2022-07-27 ENCOUNTER — APPOINTMENT (OUTPATIENT)
Dept: INFUSION CENTER | Facility: MEDICAL CENTER | Age: 22
End: 2022-07-27
Attending: PEDIATRICS
Payer: COMMERCIAL

## 2022-07-27 NOTE — PROGRESS NOTES
Medical Social Work    SW met with patient to introduce self in person, and check on well being. Patient presented for treatment on his own.    Patient stated being very grateful for the support SW has provided his mother, along with [facility] getting the work completed on their roof. 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     Patient states he has figured out his plans for returning to [employer], and has decided to reduce his class load and will be attending 2 classes this semester. Patient states he has come to terms with completing an additional semester to graduate, and doesn't want to push himself completing 14 credits (4 classes).     SW was notified by [facility] that [facility] had let them know about billing statement she received for treatment that wasn't covered by insurance. [facility] confirmed patient received a bill, and does not believe it to be an EOB.     Plan: [facility] will contact [facility] to discuss the potential outstanding bill, and continue to provide support.

## 2022-07-27 NOTE — TELEPHONE ENCOUNTER
Discharge phone call to pt re: visit on 7/26/22. Parent reports pt is doing well.  No questions or concerns at this time.

## 2022-08-02 ENCOUNTER — HOSPITAL ENCOUNTER (OUTPATIENT)
Dept: INFUSION CENTER | Facility: MEDICAL CENTER | Age: 22
End: 2022-08-02
Attending: PEDIATRICS
Payer: COMMERCIAL

## 2022-08-02 VITALS
HEART RATE: 83 BPM | OXYGEN SATURATION: 99 % | HEIGHT: 66 IN | DIASTOLIC BLOOD PRESSURE: 68 MMHG | BODY MASS INDEX: 24.62 KG/M2 | RESPIRATION RATE: 20 BRPM | WEIGHT: 153.22 LBS | SYSTOLIC BLOOD PRESSURE: 100 MMHG | TEMPERATURE: 98.1 F

## 2022-08-02 DIAGNOSIS — C91.Z0 B LYMPHOBLASTIC LEUKEMIA WITH T(9;22)(Q34;Q11.2);BCR-ABL1 (HCC): ICD-10-CM

## 2022-08-02 DIAGNOSIS — Z01.89 ENCOUNTER FOR LUMBAR PUNCTURE: ICD-10-CM

## 2022-08-02 DIAGNOSIS — Z51.11 ENCOUNTER FOR ANTINEOPLASTIC CHEMOTHERAPY: ICD-10-CM

## 2022-08-02 DIAGNOSIS — C91.00 PHILADELPHIA CHROMOSOME POSITIVE ACUTE LYMPHOBLASTIC LEUKEMIA (HCC): ICD-10-CM

## 2022-08-02 LAB
BASOPHILS # BLD AUTO: 0 % (ref 0–1.8)
BASOPHILS # BLD: 0 K/UL (ref 0–0.12)
EOSINOPHIL # BLD AUTO: 0 K/UL (ref 0–0.51)
EOSINOPHIL NFR BLD: 0 % (ref 0–6.9)
ERYTHROCYTE [DISTWIDTH] IN BLOOD BY AUTOMATED COUNT: 46.2 FL (ref 35.9–50)
HCT VFR BLD AUTO: 20.6 % (ref 42–52)
HGB BLD-MCNC: 7 G/DL (ref 14–18)
LYMPHOCYTES # BLD AUTO: 0.41 K/UL (ref 1–4.8)
LYMPHOCYTES NFR BLD: 58.2 % (ref 22–41)
MANUAL DIFF BLD: NORMAL
MCH RBC QN AUTO: 29.3 PG (ref 27–33)
MCHC RBC AUTO-ENTMCNC: 34 G/DL (ref 33.7–35.3)
MCV RBC AUTO: 86.2 FL (ref 81.4–97.8)
MONOCYTES # BLD AUTO: 0.04 K/UL (ref 0–0.85)
MONOCYTES NFR BLD AUTO: 5.1 % (ref 0–13.4)
MORPHOLOGY BLD-IMP: NORMAL
NEUTROPHILS # BLD AUTO: 0.26 K/UL (ref 1.82–7.42)
NEUTROPHILS NFR BLD: 36.7 % (ref 44–72)
NRBC # BLD AUTO: 0 K/UL
NRBC BLD-RTO: 0 /100 WBC
PLATELET # BLD AUTO: 30 K/UL (ref 164–446)
PLATELET BLD QL SMEAR: NORMAL
PMV BLD AUTO: 9.8 FL (ref 9–12.9)
RBC # BLD AUTO: 2.39 M/UL (ref 4.7–6.1)
WBC # BLD AUTO: 0.7 K/UL (ref 4.8–10.8)

## 2022-08-02 PROCEDURE — 85007 BL SMEAR W/DIFF WBC COUNT: CPT

## 2022-08-02 PROCEDURE — 36592 COLLECT BLOOD FROM PICC: CPT

## 2022-08-02 PROCEDURE — 99211 OFF/OP EST MAY X REQ PHY/QHP: CPT

## 2022-08-02 PROCEDURE — 99213 OFFICE O/P EST LOW 20 MIN: CPT | Performed by: PEDIATRICS

## 2022-08-02 PROCEDURE — 85025 COMPLETE CBC W/AUTO DIFF WBC: CPT

## 2022-08-02 RX ORDER — ONDANSETRON 2 MG/ML
8 INJECTION INTRAMUSCULAR; INTRAVENOUS ONCE
Status: CANCELLED | OUTPATIENT
Start: 2022-08-05

## 2022-08-02 RX ORDER — LIDOCAINE AND PRILOCAINE 25; 25 MG/G; MG/G
CREAM TOPICAL PRN
Status: CANCELLED | OUTPATIENT
Start: 2022-08-05

## 2022-08-02 RX ORDER — LIDOCAINE AND PRILOCAINE 25; 25 MG/G; MG/G
CREAM TOPICAL PRN
Status: DISCONTINUED | OUTPATIENT
Start: 2022-08-02 | End: 2022-08-03 | Stop reason: HOSPADM

## 2022-08-02 RX ORDER — LORAZEPAM 2 MG/ML
1 INJECTION INTRAMUSCULAR EVERY 6 HOURS PRN
Status: CANCELLED | OUTPATIENT
Start: 2022-08-09

## 2022-08-02 RX ORDER — ONDANSETRON 2 MG/ML
8 INJECTION INTRAMUSCULAR; INTRAVENOUS ONCE
Status: DISCONTINUED | OUTPATIENT
Start: 2022-08-02 | End: 2022-08-03 | Stop reason: HOSPADM

## 2022-08-02 RX ORDER — ONDANSETRON 2 MG/ML
8 INJECTION INTRAMUSCULAR; INTRAVENOUS EVERY 8 HOURS PRN
Status: DISCONTINUED | OUTPATIENT
Start: 2022-08-02 | End: 2022-08-03 | Stop reason: HOSPADM

## 2022-08-02 RX ORDER — ONDANSETRON 2 MG/ML
8 INJECTION INTRAMUSCULAR; INTRAVENOUS EVERY 8 HOURS PRN
Status: CANCELLED | OUTPATIENT
Start: 2022-08-09

## 2022-08-02 RX ORDER — LORAZEPAM 2 MG/ML
1 INJECTION INTRAMUSCULAR EVERY 6 HOURS PRN
Status: DISCONTINUED | OUTPATIENT
Start: 2022-08-02 | End: 2022-08-03 | Stop reason: HOSPADM

## 2022-08-02 ASSESSMENT — FIBROSIS 4 INDEX: FIB4 SCORE: 2

## 2022-08-02 NOTE — PROGRESS NOTES
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                                                                                                                                               Performance Status: Karnofsky 90, ECOG  1       Current Therapeutic Medications:  (verified 100% compliance)     Imatinib 600 mg PO daily (start 6/3/2022)   Mercaptopurine 100 mg PO Tues-Sun and 125 mg Mon (start 7/5/2022, HELD 7/26/2022 - current)     Evaluations / Study Labs (obtained 8/2/2022 ):     WBC 0.7, Hgb 7.0, platelets 30  ,          Therapy Given (8/2/2022):     ALL therapy HELD x 1 week with the exception of imatinib.  (HOLDING 6-MP)        Disposition: Return to clinic 4 days for reevaluation of counts and possible Day 22 therapy.      Pepe Faye MD  Pediatric Hematology / Oncology  [facility]  Cell.  [phone]  Office. [phone]

## 2022-08-02 NOTE — PROGRESS NOTES
"Pharmacy Chemotherapy Calculations Note:  NO CHEMO 8/2/22. TREATMENT DEFERRED FOR LOW ANC/PLTS  Dx: B-ALL, PH+      Previous treatment: Induction IB Days 15-18 on 7/19-7/22/22     Protocol: Induction 1B per [id number] (on MHDU2012 phase 3 trial starting Induction 1A Part 2 D15)             /68   Pulse 83   Temp 36.7 °C (98.1 °F) (Temporal)   Resp 20   Ht 1.67 m (5' 5.75\")   Wt 69.5 kg (153 lb 3.5 oz)   SpO2 99%   BMI 24.92 kg/m²  Body surface area is 1.8 meters squared.  Induction IB Dosing values:   Ht 167.5 cm Wt 64 kg BSA 1.73 m²     Labs from 8/2/22 reviewed - tx deferred for counts per Dr Neyda Bryant, PharmD, BCOP                     "

## 2022-08-02 NOTE — PROGRESS NOTES
PT to [facility] for LP with IT methotrexate and chemo.      Afebrile.  VSS. PICC line accessed and labs sent. PICC line dressing completed. Skin intact and no s/s of infection. PT tolerated well.      Pts ANC at 260 and platelets at 30. Dr. Faye aware. Will defer treatment for Friday 8/5/22.

## 2022-08-03 ENCOUNTER — TELEPHONE (OUTPATIENT)
Dept: INFUSION CENTER | Facility: MEDICAL CENTER | Age: 22
End: 2022-08-03
Payer: COMMERCIAL

## 2022-08-03 DIAGNOSIS — C91.Z0 B LYMPHOBLASTIC LEUKEMIA WITH T(9;22)(Q34;Q11.2);BCR-ABL1 (HCC): ICD-10-CM

## 2022-08-03 RX ORDER — SODIUM CHLORIDE 0.9 % (FLUSH) 0.9 %
3 SYRINGE (ML) INJECTION EVERY 12 HOURS
Qty: 600 ML | Refills: 0 | Status: SHIPPED | OUTPATIENT
Start: 2022-08-03 | End: 2022-09-25

## 2022-08-03 NOTE — TELEPHONE ENCOUNTER
Discharge phone call to pt re: visit on 8/2/22. Parent reports pt is doing well.  No questions or concerns at this time.

## 2022-08-05 ENCOUNTER — HOSPITAL ENCOUNTER (OUTPATIENT)
Dept: INFUSION CENTER | Facility: MEDICAL CENTER | Age: 22
End: 2022-08-05
Attending: PEDIATRICS
Payer: COMMERCIAL

## 2022-08-05 VITALS
RESPIRATION RATE: 18 BRPM | BODY MASS INDEX: 24.66 KG/M2 | OXYGEN SATURATION: 100 % | TEMPERATURE: 97.7 F | DIASTOLIC BLOOD PRESSURE: 74 MMHG | HEART RATE: 86 BPM | SYSTOLIC BLOOD PRESSURE: 112 MMHG | WEIGHT: 153.44 LBS | HEIGHT: 66 IN

## 2022-08-05 DIAGNOSIS — C91.00 PHILADELPHIA CHROMOSOME POSITIVE ACUTE LYMPHOBLASTIC LEUKEMIA (HCC): ICD-10-CM

## 2022-08-05 DIAGNOSIS — T45.1X5A ANEMIA DUE TO ANTINEOPLASTIC CHEMOTHERAPY: ICD-10-CM

## 2022-08-05 DIAGNOSIS — Z01.89 ENCOUNTER FOR LUMBAR PUNCTURE: ICD-10-CM

## 2022-08-05 DIAGNOSIS — C91.Z0 B LYMPHOBLASTIC LEUKEMIA WITH T(9;22)(Q34;Q11.2);BCR-ABL1 (HCC): ICD-10-CM

## 2022-08-05 DIAGNOSIS — Z51.11 ENCOUNTER FOR ANTINEOPLASTIC CHEMOTHERAPY: ICD-10-CM

## 2022-08-05 DIAGNOSIS — D64.81 ANEMIA DUE TO ANTINEOPLASTIC CHEMOTHERAPY: ICD-10-CM

## 2022-08-05 LAB
ABO GROUP BLD: NORMAL
ANISOCYTOSIS BLD QL SMEAR: ABNORMAL
BARCODED ABORH UBTYP: 5100
BARCODED PRD CODE UBPRD: NORMAL
BARCODED UNIT NUM UBUNT: NORMAL
BASOPHILS # BLD AUTO: 0 % (ref 0–1.8)
BASOPHILS # BLD: 0 K/UL (ref 0–0.12)
BLD GP AB SCN SERPL QL: NORMAL
COMPONENT R 8504R: NORMAL
EOSINOPHIL # BLD AUTO: 0 K/UL (ref 0–0.51)
EOSINOPHIL NFR BLD: 0 % (ref 0–6.9)
ERYTHROCYTE [DISTWIDTH] IN BLOOD BY AUTOMATED COUNT: 46.9 FL (ref 35.9–50)
HCT VFR BLD AUTO: 20.2 % (ref 42–52)
HGB BLD-MCNC: 7 G/DL (ref 14–18)
LYMPHOCYTES # BLD AUTO: 0.47 K/UL (ref 1–4.8)
LYMPHOCYTES NFR BLD: 66.9 % (ref 22–41)
MANUAL DIFF BLD: NORMAL
MCH RBC QN AUTO: 29.5 PG (ref 27–33)
MCHC RBC AUTO-ENTMCNC: 34.7 G/DL (ref 33.7–35.3)
MCV RBC AUTO: 85.2 FL (ref 81.4–97.8)
MICROCYTES BLD QL SMEAR: ABNORMAL
MONOCYTES # BLD AUTO: 0.09 K/UL (ref 0–0.85)
MONOCYTES NFR BLD AUTO: 12.3 % (ref 0–13.4)
MORPHOLOGY BLD-IMP: NORMAL
NEUTROPHILS # BLD AUTO: 0.15 K/UL (ref 1.82–7.42)
NEUTROPHILS NFR BLD: 20.8 % (ref 44–72)
NRBC # BLD AUTO: 0 K/UL
NRBC BLD-RTO: 0 /100 WBC
PLATELET # BLD AUTO: 68 K/UL (ref 164–446)
PLATELET BLD QL SMEAR: NORMAL
PMV BLD AUTO: 9.8 FL (ref 9–12.9)
PRODUCT TYPE UPROD: NORMAL
RBC # BLD AUTO: 2.37 M/UL (ref 4.7–6.1)
RBC BLD AUTO: PRESENT
RH BLD: NORMAL
UNIT STATUS USTAT: NORMAL
WBC # BLD AUTO: 0.7 K/UL (ref 4.8–10.8)

## 2022-08-05 PROCEDURE — 86923 COMPATIBILITY TEST ELECTRIC: CPT

## 2022-08-05 PROCEDURE — 36592 COLLECT BLOOD FROM PICC: CPT

## 2022-08-05 PROCEDURE — 86900 BLOOD TYPING SEROLOGIC ABO: CPT

## 2022-08-05 PROCEDURE — 86945 BLOOD PRODUCT/IRRADIATION: CPT

## 2022-08-05 PROCEDURE — 85025 COMPLETE CBC W/AUTO DIFF WBC: CPT

## 2022-08-05 PROCEDURE — 306780 HCHG STAT FOR TRANSFUSION PER CASE

## 2022-08-05 PROCEDURE — 36430 TRANSFUSION BLD/BLD COMPNT: CPT

## 2022-08-05 PROCEDURE — 86850 RBC ANTIBODY SCREEN: CPT

## 2022-08-05 PROCEDURE — 85007 BL SMEAR W/DIFF WBC COUNT: CPT

## 2022-08-05 PROCEDURE — 86901 BLOOD TYPING SEROLOGIC RH(D): CPT

## 2022-08-05 PROCEDURE — P9016 RBC LEUKOCYTES REDUCED: HCPCS

## 2022-08-05 RX ORDER — DIPHENHYDRAMINE HYDROCHLORIDE 50 MG/ML
25 INJECTION INTRAMUSCULAR; INTRAVENOUS PRN
Status: CANCELLED | OUTPATIENT
Start: 2022-08-05

## 2022-08-05 RX ORDER — ACETAMINOPHEN 325 MG/1
650 TABLET ORAL PRN
Status: DISCONTINUED | OUTPATIENT
Start: 2022-08-05 | End: 2022-08-06 | Stop reason: HOSPADM

## 2022-08-05 RX ORDER — ONDANSETRON 2 MG/ML
8 INJECTION INTRAMUSCULAR; INTRAVENOUS ONCE
Status: DISCONTINUED | OUTPATIENT
Start: 2022-08-05 | End: 2022-08-06 | Stop reason: HOSPADM

## 2022-08-05 RX ORDER — LIDOCAINE AND PRILOCAINE 25; 25 MG/G; MG/G
CREAM TOPICAL PRN
Status: CANCELLED | OUTPATIENT
Start: 2022-08-09

## 2022-08-05 RX ORDER — LIDOCAINE AND PRILOCAINE 25; 25 MG/G; MG/G
CREAM TOPICAL PRN
Status: DISCONTINUED | OUTPATIENT
Start: 2022-08-05 | End: 2022-08-06 | Stop reason: HOSPADM

## 2022-08-05 RX ORDER — ONDANSETRON 2 MG/ML
8 INJECTION INTRAMUSCULAR; INTRAVENOUS ONCE
Status: CANCELLED | OUTPATIENT
Start: 2022-08-09

## 2022-08-05 RX ORDER — ACETAMINOPHEN 325 MG/1
650 TABLET ORAL PRN
Status: CANCELLED | OUTPATIENT
Start: 2022-08-05

## 2022-08-05 RX ORDER — DIPHENHYDRAMINE HYDROCHLORIDE 50 MG/ML
25 INJECTION INTRAMUSCULAR; INTRAVENOUS PRN
Status: DISCONTINUED | OUTPATIENT
Start: 2022-08-05 | End: 2022-08-06 | Stop reason: HOSPADM

## 2022-08-05 ASSESSMENT — FIBROSIS 4 INDEX: FIB4 SCORE: 2.86

## 2022-08-05 NOTE — PROGRESS NOTES
ADELA from Lab called with critical result of WBC 0.7 at 1040. Critical lab result read back to MJ.   Dr. Faye notified of critical lab result at 1042.  Critical lab result read back by Dr. Faye.

## 2022-08-05 NOTE — PROGRESS NOTES
Pt to [facility] for lab draw, doctors office visit, LP and chemotherapy administration.      Afebrile.  VSS.  Awake and alert in no acute distress.      Right upper arm PICC line in place. Dressing clean, dry, and intact from 8/2/22. Line flushes well with brisk blood return.     Labs drawn from PICC and sent.     Office visit with Dr. Faye completed. - chemotherapy held and deferred until next week. Hemoglobin 7- 1 unit of PRBCs ordered per MD.     Transfusion consent and education signed by patient. No questions at this time.     PRBC: Donor #  [phone] started at 1225    Total volume infused: 378ml    Vital signs monitored per protocol.  Transfusion completed at 1420 and PT tolerated well.       Will return for next visit on 8/9/22.

## 2022-08-05 NOTE — PROGRESS NOTES
"Pharmacy Chemotherapy Calculations Note:  NO CHEMO 8/5/22. TREATMENT DEFERRED FOR LOW ANC  Dx: B-ALL, PH+      Previous treatment: Induction IB Days 15-18 on 7/19-7/22/22     Protocol: Induction 1B per [id number] (on [id number] phase 3 trial starting Induction 1A Part 2 D15)             /77   Pulse 90   Temp 36.3 °C (97.3 °F) (Temporal)   Resp 20   Ht 1.665 m (5' 5.55\")   Wt 69.6 kg (153 lb 7 oz)   BMI 25.11 kg/m²  Body surface area is 1.79 meters squared.  Induction IB Dosing values:   Ht 167.5 cm Wt 64 kg BSA 1.73 m²     Labs from 8/5/22 reviewed - tx deferred for counts per Dr Faye, ANC must be > 300 and plts > 30k prior to proceeding with each 4 day cytarabine.             Marifer Torres, PharmD, BCOP                       "

## 2022-08-05 NOTE — PROGRESS NOTES
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 patient today, recommendations will depend loosely on randomization following Induction IB     8) Access:               - R PICC placed, C/D/I, BID flushes              - Will consider replacing with Port-a-cath after End of Induction IB bone marrow evaluations      9) Research Participant:        [facility] - Source Data       Diagnosis: Ph+ Precursor B-Cell Acute Lymphoblastic Leukemia     Disease Status: ACTIVE, CNS3c, testicular negative, HSV1 IgG POSITIVE, CMV IgG NEGATIVE, VARICELLA IgG POSITIVE     Active Studies: [id number], [id number]                                                                                                      Inactive Studies: [id number]                                                                                                                                                Optional Studies: None             Protocol: International Phase 3 trial in [place] chromosome-positive acute lymphoblastic leukemia (Ph+ ALL) testing imatinib in combination with two  different cytotoxic chemotherapy backbones.      Treatment Plan:   SPNC1273(OS), Induction 1B, Day 22 (HELD now in 2nd week - 10 days)     Height: 1.675 m      Weight: 64 kg       BSA: 1.73 m²   (Start of Induction 1B 7/5/2022)                                                                                                                                               Performance Status: Karnofsky 90, ECOG  1       Current Therapeutic Medications:  (verified 100% compliance)     Imatinib 600 mg PO daily (start 6/3/2022)   Mercaptopurine 100 mg PO Tues-Sun and 125 mg Mon (start 7/5/2022, HELD 7/26/2022 - current)     Evaluations / Study Labs (obtained 8/5/2022):     WBC 0.7, Hgb 7.0, platelets 68  ,          Therapy Given (8/2/2022):    ALL therapy HELD x 1 week with the exception of imatinib.  (HOLDING 6-MP)          Pepe Faye MD  Pediatric Hematology / Oncology  [facility]  Cell.  [phone]  Office. [phone]

## 2022-08-08 NOTE — PROGRESS NOTES
"Pharmacy Chemotherapy Calculations Note:    Dx: B-ALL, PH+    Cycle: Induction IB Days 22-25 (delayed 14 days for low counts)  Previous treatment: Induction IB Days 15-18 on 7/19-7/22/22     Protocol: Induction 1B per [id number] (on [id number] phase 3 trial starting Induction 1A Part 2 D15)             /58   Pulse 85   Temp 36.4 °C (97.6 °F) (Temporal)   Resp 18   Ht 1.67 m (5' 5.75\")   Wt 71.2 kg (156 lb 15.5 oz)   SpO2 99%   BMI 25.53 kg/m²  Body surface area is 1.82 meters squared.  Induction IB Dosing values:   Ht 167.5 cm Wt 64 kg BSA 1.73 m²     Labs 8/9/22:  ANC~ 330 Plt = 168k   Hgb = 7.4     Ok to proceed with Days 22-25 ANC must be > 300 and plts > 30k prior to proceeding with each 4 day cytarabine per protocol.         Cytarabine 75 mg/m² x 1.2 m² = 136.5 mg   <10% difference, okay to treat with final dose = 130 mg IV on Days 22-25    IT Methotrexate 12 mg fixed dose for age > 3 yo = 12 mg              Fixed dose; no calc required = 0 mg IT to be given by MD on Day 22    Marifer Torres, PharmD, BCOP                       "

## 2022-08-09 ENCOUNTER — HOSPITAL ENCOUNTER (OUTPATIENT)
Dept: INFUSION CENTER | Facility: MEDICAL CENTER | Age: 22
End: 2022-08-09
Attending: PEDIATRICS
Payer: COMMERCIAL

## 2022-08-09 VITALS
HEIGHT: 66 IN | SYSTOLIC BLOOD PRESSURE: 111 MMHG | BODY MASS INDEX: 25.23 KG/M2 | WEIGHT: 156.97 LBS | OXYGEN SATURATION: 100 % | TEMPERATURE: 98.2 F | HEART RATE: 85 BPM | RESPIRATION RATE: 18 BRPM | DIASTOLIC BLOOD PRESSURE: 71 MMHG

## 2022-08-09 DIAGNOSIS — Z51.11 ENCOUNTER FOR ANTINEOPLASTIC CHEMOTHERAPY: ICD-10-CM

## 2022-08-09 DIAGNOSIS — C91.00 PHILADELPHIA CHROMOSOME POSITIVE ACUTE LYMPHOBLASTIC LEUKEMIA (HCC): ICD-10-CM

## 2022-08-09 DIAGNOSIS — C91.Z0 B LYMPHOBLASTIC LEUKEMIA WITH T(9;22)(Q34;Q11.2);BCR-ABL1 (HCC): ICD-10-CM

## 2022-08-09 DIAGNOSIS — Z01.89 ENCOUNTER FOR LUMBAR PUNCTURE: ICD-10-CM

## 2022-08-09 LAB
ANISOCYTOSIS BLD QL SMEAR: ABNORMAL
BASOPHILS # BLD AUTO: 0 % (ref 0–1.8)
BASOPHILS # BLD: 0 K/UL (ref 0–0.12)
BURR CELLS/RBC NFR CSF MANUAL: 0 %
CLARITY CSF: CLEAR
COLOR CSF: NORMAL
COLOR SPUN CSF: NORMAL
CYTOLOGY REG CYTOL: NORMAL
EOSINOPHIL # BLD AUTO: 0 K/UL (ref 0–0.51)
EOSINOPHIL NFR BLD: 0 % (ref 0–6.9)
ERYTHROCYTE [DISTWIDTH] IN BLOOD BY AUTOMATED COUNT: 48.9 FL (ref 35.9–50)
HCT VFR BLD AUTO: 21.8 % (ref 42–52)
HGB BLD-MCNC: 7.4 G/DL (ref 14–18)
LYMPHOCYTES # BLD AUTO: 0.63 K/UL (ref 1–4.8)
LYMPHOCYTES NFR BLD: 57.6 % (ref 22–41)
LYMPHOCYTES NFR CSF: 95 %
MACROCYTES BLD QL SMEAR: ABNORMAL
MANUAL DIFF BLD: NORMAL
MCH RBC QN AUTO: 30 PG (ref 27–33)
MCHC RBC AUTO-ENTMCNC: 33.9 G/DL (ref 33.7–35.3)
MCV RBC AUTO: 88.3 FL (ref 81.4–97.8)
MONOCYTES # BLD AUTO: 0.14 K/UL (ref 0–0.85)
MONOCYTES NFR BLD AUTO: 12.7 % (ref 0–13.4)
MONONUC CELLS NFR CSF: 5 %
MORPHOLOGY BLD-IMP: NORMAL
NEUTROPHILS # BLD AUTO: 0.33 K/UL (ref 1.82–7.42)
NEUTROPHILS NFR BLD: 29.7 % (ref 44–72)
NRBC # BLD AUTO: 0.03 K/UL
NRBC BLD-RTO: 2.7 /100 WBC
OVALOCYTES BLD QL SMEAR: NORMAL
PLATELET # BLD AUTO: 168 K/UL (ref 164–446)
PLATELET BLD QL SMEAR: NORMAL
PMV BLD AUTO: 9.7 FL (ref 9–12.9)
POIKILOCYTOSIS BLD QL SMEAR: NORMAL
POLYCHROMASIA BLD QL SMEAR: NORMAL
RBC # BLD AUTO: 2.47 M/UL (ref 4.7–6.1)
RBC # CSF: 9 CELLS/UL
RBC BLD AUTO: PRESENT
SPECIMEN VOL CSF: 3 ML
TUBE # CSF: 2
TUBE # CSF: 2
WBC # BLD AUTO: 1.1 K/UL (ref 4.8–10.8)
WBC # CSF: 1 CELLS/UL (ref 0–10)

## 2022-08-09 PROCEDURE — 700111 HCHG RX REV CODE 636 W/ 250 OVERRIDE (IP): Performed by: PEDIATRICS

## 2022-08-09 PROCEDURE — 36592 COLLECT BLOOD FROM PICC: CPT

## 2022-08-09 PROCEDURE — 96409 CHEMO IV PUSH SNGL DRUG: CPT

## 2022-08-09 PROCEDURE — 85025 COMPLETE CBC W/AUTO DIFF WBC: CPT

## 2022-08-09 PROCEDURE — 99211 OFF/OP EST MAY X REQ PHY/QHP: CPT | Mod: 25

## 2022-08-09 PROCEDURE — 96450 CHEMOTHERAPY INTO CNS: CPT | Performed by: PEDIATRICS

## 2022-08-09 PROCEDURE — 700101 HCHG RX REV CODE 250: Performed by: PEDIATRICS

## 2022-08-09 PROCEDURE — 96450 CHEMOTHERAPY INTO CNS: CPT

## 2022-08-09 PROCEDURE — 89051 BODY FLUID CELL COUNT: CPT

## 2022-08-09 PROCEDURE — 99214 OFFICE O/P EST MOD 30 MIN: CPT | Mod: 25 | Performed by: PEDIATRICS

## 2022-08-09 PROCEDURE — 96375 TX/PRO/DX INJ NEW DRUG ADDON: CPT | Mod: XU

## 2022-08-09 PROCEDURE — 85007 BL SMEAR W/DIFF WBC COUNT: CPT

## 2022-08-09 RX ORDER — MIDAZOLAM HYDROCHLORIDE 1 MG/ML
2 INJECTION INTRAMUSCULAR; INTRAVENOUS ONCE
Status: COMPLETED | OUTPATIENT
Start: 2022-08-09 | End: 2022-08-09

## 2022-08-09 RX ORDER — LIDOCAINE AND PRILOCAINE 25; 25 MG/G; MG/G
CREAM TOPICAL PRN
Status: DISCONTINUED | OUTPATIENT
Start: 2022-08-09 | End: 2022-08-11 | Stop reason: HOSPADM

## 2022-08-09 RX ORDER — ONDANSETRON 2 MG/ML
8 INJECTION INTRAMUSCULAR; INTRAVENOUS ONCE
Status: COMPLETED | OUTPATIENT
Start: 2022-08-09 | End: 2022-08-09

## 2022-08-09 RX ORDER — MIDAZOLAM HYDROCHLORIDE 1 MG/ML
2 INJECTION INTRAMUSCULAR; INTRAVENOUS ONCE
Status: CANCELLED
Start: 2022-08-09 | End: 2022-08-09

## 2022-08-09 RX ADMIN — ONDANSETRON HYDROCHLORIDE 8 MG: 2 SOLUTION INTRAMUSCULAR; INTRAVENOUS at 09:36

## 2022-08-09 RX ADMIN — METHOTREXATE 12 MG: 25 INJECTION INTRA-ARTERIAL; INTRAMUSCULAR; INTRATHECAL; INTRAVENOUS at 10:34

## 2022-08-09 RX ADMIN — CYTARABINE 130 MG: 20 INJECTION, SOLUTION INTRATHECAL; INTRAVENOUS; SUBCUTANEOUS at 10:55

## 2022-08-09 RX ADMIN — LIDOCAINE AND PRILOCAINE 1 APPLICATION: 25; 25 CREAM TOPICAL at 09:20

## 2022-08-09 RX ADMIN — MIDAZOLAM HYDROCHLORIDE 2 MG: 1 INJECTION, SOLUTION INTRAMUSCULAR; INTRAVENOUS at 10:14

## 2022-08-09 ASSESSMENT — FIBROSIS 4 INDEX: FIB4 SCORE: 1.26

## 2022-08-09 NOTE — H&P
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 [id number], [id number]                                                                                                      Inactive Studies: [id number]                                                                                                                                                Optional Studies: None             Protocol: International Phase 3 trial in [place] 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      Treatment Plan:   [id number](OS), Induction 1B, Day 22 (Delayed 14 days)     Height: 1.675 m      Weight: 64 kg       BSA: 1.73 m²   (Start of Induction 1B 7/5/2022)                                                                                                                                               Performance Status: Karnofsky 90, ECOG  1       Current Therapeutic Medications:  (verified 100% compliance)     Imatinib 600 mg PO daily (start  6/3/2022)   Mercaptopurine 100 mg PO Tues-Sun and 125 mg Mon (start 7/5/2022, HELD 7/26/2022 - 8/9/2022) - WILL RESUME mercaptopurine this evening, will complete 1 week (7 doses for total of 28 doses)     Evaluations / Study Labs (obtained 8/9/2022):     WBC 1.1, Hgb 7.4, platelets 168  ANC  330, , absolute monocyte count 140     CSF with 9 RBC and 1 WBC     Therapy Given (8/9/2022):  Cytarabine 120 mg IV  Methotrexate 12 mg IT  Continue imatinib 600 mg p.o. daily  Resume mercaptopurine 100 mg Tues-Sun and 125 mg Mon         Disposition: Return to clinic tomorrow for cytarabine.     Pepe Faye MD  Pediatric Hematology / Oncology  [facility]  Cell.  [phone]  Office. [phone]

## 2022-08-09 NOTE — PROGRESS NOTES
[facility] to [facility] for lab draw, Doctor visit, lumbar puncture with sedation for IT Chemotherapy and for IV Chemotherapy.  Afebrile.  VSS.  Awake and alert in no acute distress.      Right upper arm PICC in place. Dressing lifting and not intact. RN encouraged patient to call [facility] in order to change dressing if lifting. Patient verbalized understanding. Clave and dressing changed using sterile technique. No signs or symptoms of infection. Biopatch in place. PICC line flushes well with brisk blood return. Labs drawn and sent. Pt tolerated well.     Visit with Dr. Faye completed. - okay to proceed with treatment today.     Labs reviewed and pre-medications given per MD orders, see MAR. Line patency verified prior to procedure. Patient given versed for anxiety. Patient calm and awake throughout procedure. Procedure performed by Dr. Faye.      Time out: 1024  Procedure start time: 1026    Monitored PT q5min and documented VS per protocol.  LP completed at 1035.   See MAR for medication adminsitration.  No unexpected events.      Chemotherapy dosage calculated independently by Mary Saravia RN and Cherrie Urbano RN and compared to road map for protocol   [id number].  Calculations within 10% of written order.     Chemotherapy given as ordered, see MAR.  Blood return verifed prior to and after chemotherapy infusion.  See Chemotherapy flowsheet.  Pt tolerated well.  No side effects or complications noted.     Pt remained supine for one hour post LP. Pt tolerated regular diet and ambulated independently. PICC line flushed with NS.  Discharged home with self once discharge criteria met (mother to drive patient home). Next appointment scheduled for tomorrow, 8/10/22.

## 2022-08-09 NOTE — PROCEDURES
Pediatric Oncology Lumbar Puncture  Procedure Note      Patient Name:  Tomas Jean-Baptiste  : 2000   MRN: [id number]    Service Location:  [facility]  Date of Service: 2022  Time: 10:45 AM    Procedure Performed By: Pepe Faye M.D.    Pre-procedural Diagnosis:  Ph+ Acute B-Lymphoblastic Leukemia having achieved remission    Post-procedural Diagnosis: Ph+ Acute B-Lymphoblastic Leukemia  having achieved remission    Procedure:  Lumbar Puncture with administration of intrathecal chemotherapy    Anxiolysis: Versed 2 mg IV x1    Analgesia: Emla cream    Intrathecal Chemotherapy:  Yes    Chemotherapy Administered:  Methotrexate 12 mg IT (in 6 mL NS)    Needle Size:  22 gauge, 3.5 in  Site: L3-L4   Number of Attempts: 2  Fluid:  6 ml clear fluid obtained  Labs: Cell count, cytology    Complications:  None    Procedure Note:    Tomas Jean-Baptiste is a 21 y.o. male diagnosed with Ph+ Acute B-Lymphoblastic Leukemia having achieved remission.  He presents today for scheduled chemotherapy, today is Day 22 of Induction 1B and scheduled lumbar puncture with intrathecal chemotherapy.  Prior to the procedure, the risks and benefits were discussed with the patient.  Consent for the procedure was signed by parent and placed in the patient's chart.  All pertinent labs and history were reviewed and a complete History and Physical Examination were performed and placed in the medical record.  Tomas Roy was then taken to the procedure room where the procedure was performed.  All necessary safety equipment per ASA guidelines were available.  A time-out was performed and the patient identified by name,  and medical record number.  Gloves and mask were worn during the entire procedure.  Tomas Roy was prepped and draped in the usual sterile fashion with povoiodine.  He was positioned in the left decubitus position and all landmarks including superior  posterior iliac crest, umbilicus and vertebral bodies were identified by palpation.  A 3.5 in, 22 Gauge spinal needle was introduced into the L3-L4 spinal interspace.  First attempt was unsuccessful and therefore landmarks were palpated and second attempt made resulting in 6 ml of clear fluid were obtained and sent for cell count and cytology.  Methotrexate 12 mg in 6 mL of NS was verified with the nurse bedside and then then administered into the spinal fluid. Tomas Roy tolerated the procedure without complication or bleeding.     Results:    PENDING    Pepe Faye MD  Pediatric Hematology / Oncology  [facility]  Cell.  [phone]

## 2022-08-09 NOTE — PROGRESS NOTES
Pharmacy Chemotherapy Calculations    Patient Name: Tomas Jean-Baptiste      Diagnosis: Ph+ ALL     Regimen/Dosing Reference: Induction IB (OS) per [id number]       Allergies: Amoxicillin       There were no vitals taken for this visit. There is no height or weight on file to calculate BSA.     Weight Type Height Weight BSA Additional Details   Treatment plan 167.5 cm 64 kg 1.73 m² Manually entered weight as of 7/13/2022  4:58 PM; height as of 7/13/2022  4:58 PM     Labs from 8/9/22 within treatment plan parameters.      Drug Order   (Drug name, dose, route, IV Fluid & volume, frequency, number of doses) Induction IB, Days 22 - 25   (Delayed d/t counts)   Previous treatment: Day 18 on 7/22/22     Medication = Methotrexate PF  Base Dose = 12 mg for age >/= 3 yo   Calc Dose: No calculations required   Final Dose = 12 mg  Route = INTRATHECAL   Fluid & Volume = pf NS 6 mL   Administration by MD Only    Day 22       <10% difference, OK to treat with final dose     Medication = Cytarabine (VERITO-C)  Base Dose = 75 mg/m2  Calc Dose: Base Dose x 1.82 m2 = 136.5 mg  Final Dose = 130 mg  Route = IV  Fluid & Volume = 6.5 mL chemo in syringe   Admin Duration = Over 5 minutes    Day 22 - 25       <10% difference, OK to treat with final dose     By my signature below, I confirm this process was performed independently with the BSA and all final chemotherapy dosing calculations congruent. I have reviewed the above chemotherapy order and that my calculation of the final dose and BSA (when applicable) corroborate those calculations of the  pharmacist. Discrepancies of 10% or greater in the written dose have been addressed and documented within the [facility] Progress notes.    Kayla Juarez, PharmD, BCPS

## 2022-08-09 NOTE — PROGRESS NOTES
Autumn from Lab called with critical result of WBC 1.1 and ANC 0.33 at 0909. Critical lab result read back to Autumn.   Dr. Faye notified of critical lab result at 0911.  Critical lab result read back by Dr. Faye.

## 2022-08-10 ENCOUNTER — HOSPITAL ENCOUNTER (OUTPATIENT)
Dept: INFUSION CENTER | Facility: MEDICAL CENTER | Age: 22
End: 2022-08-10
Attending: PEDIATRICS
Payer: COMMERCIAL

## 2022-08-10 VITALS
HEART RATE: 97 BPM | HEIGHT: 66 IN | TEMPERATURE: 97.2 F | WEIGHT: 154.76 LBS | RESPIRATION RATE: 18 BRPM | DIASTOLIC BLOOD PRESSURE: 60 MMHG | OXYGEN SATURATION: 98 % | SYSTOLIC BLOOD PRESSURE: 112 MMHG | BODY MASS INDEX: 24.87 KG/M2

## 2022-08-10 DIAGNOSIS — Z01.89 ENCOUNTER FOR LUMBAR PUNCTURE: ICD-10-CM

## 2022-08-10 DIAGNOSIS — Z51.11 ENCOUNTER FOR ANTINEOPLASTIC CHEMOTHERAPY: ICD-10-CM

## 2022-08-10 DIAGNOSIS — C91.00 PHILADELPHIA CHROMOSOME POSITIVE ACUTE LYMPHOBLASTIC LEUKEMIA (HCC): ICD-10-CM

## 2022-08-10 DIAGNOSIS — C91.Z0 B LYMPHOBLASTIC LEUKEMIA WITH T(9;22)(Q34;Q11.2);BCR-ABL1 (HCC): ICD-10-CM

## 2022-08-10 LAB — CYTOLOGY REG CYTOL: NORMAL

## 2022-08-10 PROCEDURE — 700111 HCHG RX REV CODE 636 W/ 250 OVERRIDE (IP): Performed by: PEDIATRICS

## 2022-08-10 PROCEDURE — 88108 CYTOPATH CONCENTRATE TECH: CPT

## 2022-08-10 PROCEDURE — 96409 CHEMO IV PUSH SNGL DRUG: CPT

## 2022-08-10 PROCEDURE — 96375 TX/PRO/DX INJ NEW DRUG ADDON: CPT

## 2022-08-10 RX ORDER — ONDANSETRON 2 MG/ML
8 INJECTION INTRAMUSCULAR; INTRAVENOUS ONCE
Status: COMPLETED | OUTPATIENT
Start: 2022-08-10 | End: 2022-08-10

## 2022-08-10 RX ADMIN — CYTARABINE 130 MG: 20 INJECTION, SOLUTION INTRATHECAL; INTRAVENOUS; SUBCUTANEOUS at 11:00

## 2022-08-10 RX ADMIN — ONDANSETRON 8 MG: 2 INJECTION INTRAMUSCULAR; INTRAVENOUS at 10:30

## 2022-08-10 ASSESSMENT — FIBROSIS 4 INDEX: FIB4 SCORE: 0.51

## 2022-08-10 NOTE — PROGRESS NOTES
"Pharmacy Chemotherapy Calculations    Patient Name: Tomas Jean-Baptiste      Diagnosis: Ph+ ALL     Regimen/Dosing Reference: Induction IB (OS) per [id number]       Allergies: Amoxicillin       /60   Pulse 97   Temp 36.2 °C (97.2 °F) (Temporal)   Resp 18   Ht 1.67 m (5' 5.75\")   Wt 70.2 kg (154 lb 12.2 oz)   SpO2 98%   BMI 25.17 kg/m²  Body surface area is 1.8 meters squared.     Weight Type Height Weight BSA   Treatment plan 167.5 cm 64 kg 1.73 m²     Labs from 8/9/22 within treatment plan parameters.      Drug Order   (Drug name, dose, route, IV Fluid & volume, frequency, number of doses) Induction IB, Days 23  Previous treatment: Day 18 on 7/22/22     Medication = cytarabine  Base Dose = 75 mg/m2   Calc Dose: Base Dose x 1.8 m2 = 135 mg  Final Dose = 130 mg  Route = IV push   Fluid & Volume = 20 mg/mL = 6.5 mL   Admin Duration: Over 5 mins    Days 22 - 25      <10% difference, OK to treat with final dose     By my signature below, I confirm this process was performed independently with the BSA and all final chemotherapy dosing calculations congruent. I have reviewed the above chemotherapy order and that my calculation of the final dose and BSA (when applicable) corroborate those calculations of the  pharmacist. Discrepancies of 10% or greater in the written dose have been addressed and documented within the [facility] Progress notes.    Judy Bryant, PharmD, BCOP                "

## 2022-08-10 NOTE — PROGRESS NOTES
Pharmacy Chemotherapy Calculations    Patient Name: Tomas Jean-Baptiste      Diagnosis: Ph+ ALL     Regimen/Dosing Reference: Induction IB (OS) per [id number]       Allergies: Amoxicillin       There were no vitals taken for this visit. There is no height or weight on file to calculate BSA.     Weight Type Height Weight BSA Additional Details   Treatment plan 167.5 cm 64 kg 1.73 m² Manually entered weight as of 7/13/2022  4:58 PM; height as of 7/13/2022  4:58 PM     Labs 8/9/2022:  ANC~ 330 Plt = 168k   Hgb = 7.4     SCr = 0.33mg/dL            Drug Order   (Drug name, dose, route, IV Fluid & volume, frequency, number of doses) Induction IB, Days 22 - 25   (Delayed d/t counts)   Previous treatment: Day 18 on 7/22/22   Medication = Cytarabine (VERITO-C)  Base Dose = 75 mg/m2  Calc Dose: Base Dose x 1.82 m2 = 136.5 mg  Final Dose = 130 mg  Route = IV  Fluid & Volume = 6.5 mL chemo in syringe   Admin Duration = Over 5 minutes    Day 22 - 25       <10% difference, OK to treat with final dose     By my signature below, I confirm this process was performed independently with the BSA and all final chemotherapy dosing calculations congruent. I have reviewed the above chemotherapy order and that my calculation of the final dose and BSA (when applicable) corroborate those calculations of the  pharmacist. Discrepancies of 10% or greater in the written dose have been addressed and documented within the [facility] Progress notes.    Mariam Rollins, PharmD, BCPS

## 2022-08-10 NOTE — PROGRESS NOTES
[facility] to [facility] for chemotherapy administration.      Afebrile.  VSS.  Awake and alert in no acute distress.      Okay per Dr. Faye to proceed with today's chemotherapy.     R UA Single Lume PICC in place. Dressing clean dry and intact. Brisk blood return and flushes easily. No complaints of pain/tenderness, no signs/symptoms of infection noted. No labs indicated today.      Chemotherapy dosage calculated independently by Dayna Damian RN and Tammie Covington RN and compared to road map for protocol [facility] [id number].  Calculations within 10% of written order.  Lab results reviewed.      Premedications and chemo given as ordered, see MAR.  Blood return verified prior to, during, and after chemotherapy infusion.  See Chemotherapy flowsheet.  PT tolerated well.  No side effects or complications noted.  PICC flushed per protocol.  PT home with self.  Will return for next visit on 8/11/22 for chemo and COD in anticipation of PRBC infusion on Friday; 8/12/22.

## 2022-08-11 ENCOUNTER — HOSPITAL ENCOUNTER (OUTPATIENT)
Dept: INFUSION CENTER | Facility: MEDICAL CENTER | Age: 22
End: 2022-08-11
Attending: PEDIATRICS
Payer: COMMERCIAL

## 2022-08-11 VITALS
HEART RATE: 89 BPM | HEIGHT: 66 IN | SYSTOLIC BLOOD PRESSURE: 109 MMHG | DIASTOLIC BLOOD PRESSURE: 65 MMHG | RESPIRATION RATE: 20 BRPM | WEIGHT: 153.88 LBS | OXYGEN SATURATION: 98 % | TEMPERATURE: 98 F | BODY MASS INDEX: 24.73 KG/M2

## 2022-08-11 DIAGNOSIS — T45.1X5A ANEMIA DUE TO ANTINEOPLASTIC CHEMOTHERAPY: ICD-10-CM

## 2022-08-11 DIAGNOSIS — Z01.89 ENCOUNTER FOR LUMBAR PUNCTURE: ICD-10-CM

## 2022-08-11 DIAGNOSIS — Z51.11 ENCOUNTER FOR ANTINEOPLASTIC CHEMOTHERAPY: ICD-10-CM

## 2022-08-11 DIAGNOSIS — C91.Z0 B LYMPHOBLASTIC LEUKEMIA WITH T(9;22)(Q34;Q11.2);BCR-ABL1 (HCC): ICD-10-CM

## 2022-08-11 DIAGNOSIS — D64.81 ANEMIA DUE TO ANTINEOPLASTIC CHEMOTHERAPY: ICD-10-CM

## 2022-08-11 DIAGNOSIS — C91.00 PHILADELPHIA CHROMOSOME POSITIVE ACUTE LYMPHOBLASTIC LEUKEMIA (HCC): ICD-10-CM

## 2022-08-11 LAB
ABO GROUP BLD: NORMAL
BLD GP AB SCN SERPL QL: NORMAL
RH BLD: NORMAL

## 2022-08-11 PROCEDURE — 96375 TX/PRO/DX INJ NEW DRUG ADDON: CPT

## 2022-08-11 PROCEDURE — 86900 BLOOD TYPING SEROLOGIC ABO: CPT

## 2022-08-11 PROCEDURE — 96409 CHEMO IV PUSH SNGL DRUG: CPT

## 2022-08-11 PROCEDURE — 36592 COLLECT BLOOD FROM PICC: CPT

## 2022-08-11 PROCEDURE — 86901 BLOOD TYPING SEROLOGIC RH(D): CPT

## 2022-08-11 PROCEDURE — 86850 RBC ANTIBODY SCREEN: CPT

## 2022-08-11 PROCEDURE — 700111 HCHG RX REV CODE 636 W/ 250 OVERRIDE (IP): Performed by: PEDIATRICS

## 2022-08-11 RX ORDER — ONDANSETRON 2 MG/ML
8 INJECTION INTRAMUSCULAR; INTRAVENOUS EVERY 8 HOURS PRN
Status: CANCELLED | OUTPATIENT
Start: 2022-08-15

## 2022-08-11 RX ORDER — DEXTROSE MONOHYDRATE, SODIUM CHLORIDE, AND POTASSIUM CHLORIDE 50; 1.49; 4.5 G/1000ML; G/1000ML; G/1000ML
INJECTION, SOLUTION INTRAVENOUS CONTINUOUS
Status: CANCELLED | OUTPATIENT
Start: 2022-08-15

## 2022-08-11 RX ORDER — LORAZEPAM 2 MG/ML
1 INJECTION INTRAMUSCULAR EVERY 6 HOURS PRN
Status: CANCELLED | OUTPATIENT
Start: 2022-08-11

## 2022-08-11 RX ORDER — ACETAMINOPHEN 325 MG/1
650 TABLET ORAL PRN
Status: CANCELLED | OUTPATIENT
Start: 2022-08-11

## 2022-08-11 RX ORDER — SODIUM CHLORIDE 9 MG/ML
20 INJECTION, SOLUTION INTRAVENOUS PRN
Status: CANCELLED | OUTPATIENT
Start: 2022-08-15

## 2022-08-11 RX ORDER — ONDANSETRON 2 MG/ML
8 INJECTION INTRAMUSCULAR; INTRAVENOUS ONCE
Status: CANCELLED | OUTPATIENT
Start: 2022-08-12

## 2022-08-11 RX ORDER — ONDANSETRON 2 MG/ML
8 INJECTION INTRAMUSCULAR; INTRAVENOUS EVERY 8 HOURS PRN
Status: CANCELLED | OUTPATIENT
Start: 2022-08-11

## 2022-08-11 RX ORDER — SODIUM CHLORIDE 9 MG/ML
500 INJECTION, SOLUTION INTRAVENOUS CONTINUOUS
Status: CANCELLED | OUTPATIENT
Start: 2022-08-12

## 2022-08-11 RX ORDER — LORAZEPAM 2 MG/ML
1 INJECTION INTRAMUSCULAR EVERY 6 HOURS PRN
Status: CANCELLED | OUTPATIENT
Start: 2022-08-12

## 2022-08-11 RX ORDER — SODIUM CHLORIDE 9 MG/ML
500 INJECTION, SOLUTION INTRAVENOUS CONTINUOUS
Status: CANCELLED | OUTPATIENT
Start: 2022-08-11

## 2022-08-11 RX ORDER — LIDOCAINE AND PRILOCAINE 25; 25 MG/G; MG/G
CREAM TOPICAL PRN
Status: CANCELLED | OUTPATIENT
Start: 2022-08-15

## 2022-08-11 RX ORDER — ONDANSETRON 2 MG/ML
8 INJECTION INTRAMUSCULAR; INTRAVENOUS EVERY 8 HOURS PRN
Status: CANCELLED | OUTPATIENT
Start: 2022-08-12

## 2022-08-11 RX ORDER — DIPHENHYDRAMINE HYDROCHLORIDE 50 MG/ML
25 INJECTION INTRAMUSCULAR; INTRAVENOUS PRN
Status: CANCELLED | OUTPATIENT
Start: 2022-08-11

## 2022-08-11 RX ORDER — ONDANSETRON 2 MG/ML
8 INJECTION INTRAMUSCULAR; INTRAVENOUS ONCE
Status: CANCELLED | OUTPATIENT
Start: 2022-08-15

## 2022-08-11 RX ORDER — SODIUM CHLORIDE 9 MG/ML
500 INJECTION, SOLUTION INTRAVENOUS CONTINUOUS
Status: CANCELLED | OUTPATIENT
Start: 2022-08-15

## 2022-08-11 RX ORDER — LORAZEPAM 2 MG/ML
1 INJECTION INTRAMUSCULAR EVERY 6 HOURS PRN
Status: CANCELLED | OUTPATIENT
Start: 2022-08-15

## 2022-08-11 RX ORDER — LIDOCAINE AND PRILOCAINE 25; 25 MG/G; MG/G
CREAM TOPICAL PRN
Status: CANCELLED | OUTPATIENT
Start: 2022-08-11

## 2022-08-11 RX ORDER — ONDANSETRON 2 MG/ML
8 INJECTION INTRAMUSCULAR; INTRAVENOUS ONCE
Status: CANCELLED | OUTPATIENT
Start: 2022-08-11

## 2022-08-11 RX ORDER — LIDOCAINE AND PRILOCAINE 25; 25 MG/G; MG/G
CREAM TOPICAL PRN
Status: CANCELLED | OUTPATIENT
Start: 2022-08-12

## 2022-08-11 RX ORDER — ONDANSETRON 2 MG/ML
8 INJECTION INTRAMUSCULAR; INTRAVENOUS ONCE
Status: COMPLETED | OUTPATIENT
Start: 2022-08-11 | End: 2022-08-11

## 2022-08-11 RX ADMIN — ONDANSETRON HYDROCHLORIDE 8 MG: 2 SOLUTION INTRAMUSCULAR; INTRAVENOUS at 09:43

## 2022-08-11 RX ADMIN — CYTARABINE 130 MG: 20 INJECTION, SOLUTION INTRATHECAL; INTRAVENOUS; SUBCUTANEOUS at 10:11

## 2022-08-11 ASSESSMENT — FIBROSIS 4 INDEX: FIB4 SCORE: 0.51

## 2022-08-11 NOTE — PROGRESS NOTES
"Pharmacy Chemotherapy Calculations    Patient Name: Tomas Jean-Baptiste      Diagnosis: Ph+ ALL     Regimen/Dosing Reference: Induction IB (OS) per [id number]       Allergies: Amoxicillin       /66   Pulse (!) 103   Temp 36.6 °C (97.8 °F) (Temporal)   Resp 20   Ht 1.67 m (5' 5.75\")   Wt 69.8 kg (153 lb 14.1 oz)   SpO2 97%   BMI 25.03 kg/m²  Body surface area is 1.8 meters squared.     Weight Type Height Weight BSA   Treatment plan 167.5 cm 64 kg 1.73 m²     Labs from 8/9/22 within treatment plan parameters.      Drug Order   (Drug name, dose, route, IV Fluid & volume, frequency, number of doses) Induction IB, Days 24  Previous treatment: Day 18 on 7/22/22     Medication = cytarabine  Base Dose = 75 mg/m2   Calc Dose: Base Dose x 1.8 m2 = 135 mg  Final Dose = 130 mg  Route = IV push   Fluid & Volume = 20 mg/mL = 6.5 mL   Admin Duration: Over 5 mins    Days 22 - 25      <10% difference, OK to treat with final dose     By my signature below, I confirm this process was performed independently with the BSA and all final chemotherapy dosing calculations congruent. I have reviewed the above chemotherapy order and that my calculation of the final dose and BSA (when applicable) corroborate those calculations of the  pharmacist. Discrepancies of 10% or greater in the written dose have been addressed and documented within the [facility] Progress notes.    Judy Bryant, PharmD, BCOP                "

## 2022-08-11 NOTE — PROGRESS NOTES
Pt to [facility] for lab draw, doctors office visit, and chemotherapy administration.      Afebrile.  VSS.  Awake and alert in no acute distress.      R UA Single Lume PICC in place. Dressing clean dry and intact. Brisk blood return and flushes easily. COD drawn from the PICC without difficulty.   Pt tolerated well.      Chemotherapy dosage calculated independently by Huong Foster, MONTSERRAT and Cherrie Urbano RN and compared to road map for protocol [id number] COG.  Calculations within 10% of written order.       Premedications and chemo given as ordered, see MAR.  Blood return verified prior to, during, and after chemotherapy infusion.   PT tolerated well.  No side effects or complications noted.  Office visit completed with Dr Faye. PICC flushed with 20ml NS.  PT home with self.  Will return for next visit on 08/12/22.

## 2022-08-12 ENCOUNTER — HOSPITAL ENCOUNTER (OUTPATIENT)
Dept: INFUSION CENTER | Facility: MEDICAL CENTER | Age: 22
End: 2022-08-12
Attending: PEDIATRICS
Payer: COMMERCIAL

## 2022-08-12 VITALS
TEMPERATURE: 98 F | OXYGEN SATURATION: 99 % | RESPIRATION RATE: 20 BRPM | HEART RATE: 78 BPM | HEIGHT: 66 IN | DIASTOLIC BLOOD PRESSURE: 74 MMHG | SYSTOLIC BLOOD PRESSURE: 123 MMHG | WEIGHT: 154.1 LBS | BODY MASS INDEX: 24.77 KG/M2

## 2022-08-12 DIAGNOSIS — D64.81 ANEMIA DUE TO ANTINEOPLASTIC CHEMOTHERAPY: ICD-10-CM

## 2022-08-12 DIAGNOSIS — C91.00 PHILADELPHIA CHROMOSOME POSITIVE ACUTE LYMPHOBLASTIC LEUKEMIA (HCC): ICD-10-CM

## 2022-08-12 DIAGNOSIS — C91.Z0 B LYMPHOBLASTIC LEUKEMIA WITH T(9;22)(Q34;Q11.2);BCR-ABL1 (HCC): ICD-10-CM

## 2022-08-12 DIAGNOSIS — T45.1X5A ANEMIA DUE TO ANTINEOPLASTIC CHEMOTHERAPY: ICD-10-CM

## 2022-08-12 DIAGNOSIS — Z01.89 ENCOUNTER FOR LUMBAR PUNCTURE: ICD-10-CM

## 2022-08-12 DIAGNOSIS — Z51.11 ENCOUNTER FOR ANTINEOPLASTIC CHEMOTHERAPY: ICD-10-CM

## 2022-08-12 PROCEDURE — 96409 CHEMO IV PUSH SNGL DRUG: CPT

## 2022-08-12 PROCEDURE — 700111 HCHG RX REV CODE 636 W/ 250 OVERRIDE (IP): Performed by: PEDIATRICS

## 2022-08-12 PROCEDURE — 96375 TX/PRO/DX INJ NEW DRUG ADDON: CPT

## 2022-08-12 PROCEDURE — 86923 COMPATIBILITY TEST ELECTRIC: CPT

## 2022-08-12 PROCEDURE — 36430 TRANSFUSION BLD/BLD COMPNT: CPT

## 2022-08-12 PROCEDURE — 86945 BLOOD PRODUCT/IRRADIATION: CPT

## 2022-08-12 PROCEDURE — 306780 HCHG STAT FOR TRANSFUSION PER CASE

## 2022-08-12 PROCEDURE — P9016 RBC LEUKOCYTES REDUCED: HCPCS

## 2022-08-12 RX ORDER — ONDANSETRON 2 MG/ML
8 INJECTION INTRAMUSCULAR; INTRAVENOUS ONCE
Status: COMPLETED | OUTPATIENT
Start: 2022-08-12 | End: 2022-08-12

## 2022-08-12 RX ORDER — DIPHENHYDRAMINE HYDROCHLORIDE 50 MG/ML
25 INJECTION INTRAMUSCULAR; INTRAVENOUS PRN
Status: CANCELLED | OUTPATIENT
Start: 2022-08-12

## 2022-08-12 RX ORDER — ACETAMINOPHEN 325 MG/1
650 TABLET ORAL PRN
Status: DISCONTINUED | OUTPATIENT
Start: 2022-08-12 | End: 2022-08-13 | Stop reason: HOSPADM

## 2022-08-12 RX ORDER — DIPHENHYDRAMINE HYDROCHLORIDE 50 MG/ML
25 INJECTION INTRAMUSCULAR; INTRAVENOUS PRN
Status: DISCONTINUED | OUTPATIENT
Start: 2022-08-12 | End: 2022-08-13 | Stop reason: HOSPADM

## 2022-08-12 RX ORDER — ACETAMINOPHEN 325 MG/1
650 TABLET ORAL PRN
Status: CANCELLED | OUTPATIENT
Start: 2022-08-12

## 2022-08-12 RX ADMIN — ONDANSETRON 8 MG: 2 INJECTION INTRAMUSCULAR; INTRAVENOUS at 09:53

## 2022-08-12 RX ADMIN — CYTARABINE 130 MG: 20 INJECTION, SOLUTION INTRATHECAL; INTRAVENOUS; SUBCUTANEOUS at 09:59

## 2022-08-12 ASSESSMENT — FIBROSIS 4 INDEX: FIB4 SCORE: 0.51

## 2022-08-12 NOTE — PROGRESS NOTES
PT to [facility] for PR[facility]  transfusion, and IV chemotherapy.  Afebrile.  VSS.   R UA Single Lumen PICC in place. Dressing clean dry and intact. Brisk blood return and flushes easily.        Office visit completed with Dr Faye.  OK to proceed with chemo and transfusion.    Chemotherapy dosage calculated independently by Huong Foster, MONTSERRAT and Dayna Damian RN and compared to road map for protocol [id number] COG.  Calculations within 10% of written order.        Premedications and chemo given as ordered, see MAR.  Blood return verified prior to, during, and after chemotherapy infusion.   PT tolerated well.  No side effects or complications noted.      Transfusion consent signed on 08/05/2022    PRBC : Donor # [id number] started at 1020    Total volume infused:338    Vital signs monitored per protocol.  Transfusion completed at 1225 and PT tolerated well.  PICC flushed with 20 ml NS  Pt discharged home.  Next appointment scheduled on 08/15/22.

## 2022-08-12 NOTE — PROGRESS NOTES
"Pharmacy Chemotherapy Calculations    Patient Name: Tomas Jean-Baptiste      Diagnosis: Ph+ ALL     Regimen/Dosing Reference: Induction IB (OS) per [id number]       Allergies: Amoxicillin       /68   Pulse (!) 105   Temp 37.1 °C (98.7 °F) (Temporal)   Resp 20   Ht 1.67 m (5' 5.75\")   Wt 69.9 kg (154 lb 1.6 oz)   SpO2 99%   BMI 25.06 kg/m²  Body surface area is 1.8 meters squared.     Weight Type Height Weight BSA   Treatment plan 167.5 cm 64 kg 1.73 m²     Labs from 8/9/22 within treatment plan parameters.      Drug Order   (Drug name, dose, route, IV Fluid & volume, frequency, number of doses) Induction IB, Day 25  Previous treatment: Day 18 on 7/22/22     Medication = cytarabine  Base Dose = 75 mg/m2   Calc Dose: Base Dose x 1.8 m2 = 135 mg  Final Dose = 130 mg  Route = IV push   Fluid & Volume = 20 mg/mL = 6.5 mL   Admin Duration: Over 5 mins    Days 22 - 25      <10% difference, OK to treat with final dose     By my signature below, I confirm this process was performed independently with the BSA and all final chemotherapy dosing calculations congruent. I have reviewed the above chemotherapy order and that my calculation of the final dose and BSA (when applicable) corroborate those calculations of the  pharmacist. Discrepancies of 10% or greater in the written dose have been addressed and documented within the [facility] Progress notes.    Judy rByant, PharmD, BCOP                "

## 2022-08-14 NOTE — PROGRESS NOTES
"Pharmacy Chemotherapy Calculations    Patient Name: Tomas Jean-Baptiste      Diagnosis: Ph+ ALL     Regimen/Dosing Reference: Induction IB (OS) per [id number]       Allergies: Amoxicillin       /89   Pulse (!) 106   Temp 36.5 °C (97.7 °F) (Temporal)   Resp 20   Ht 1.66 m (5' 5.35\")   Wt 67.5 kg (148 lb 13 oz)   SpO2 97%   BMI 24.50 kg/m²  Body surface area is 1.76 meters squared.     No treatment HOLD parameters for day 28     Drug Order   (Drug name, dose, route, IV Fluid & volume, frequency, number of doses) Induction IB, Day 28  Previous treatment: Day 25 on 8/12/22     Medication = cytoxan  Base Dose = 1000mg/m2   Calc Dose: Base Dose x 1.76m2 = 1760mg  Final Dose = 1730mg  Route = IV  Fluid & Volume = NS 250mL  Admin Duration: Over 30min         <10% difference, OK to treat with final dose     By my signature below, I confirm this process was performed independently with the BSA and all final chemotherapy dosing calculations congruent. I have reviewed the above chemotherapy order and that my calculation of the final dose and BSA (when applicable) corroborate those calculations of the  pharmacist. Discrepancies of 10% or greater in the written dose have been addressed and documented within the [facility] Progress notes.    JAE Wilson Pharm.D.                  "

## 2022-08-15 ENCOUNTER — HOSPITAL ENCOUNTER (OUTPATIENT)
Dept: INFUSION CENTER | Facility: MEDICAL CENTER | Age: 22
End: 2022-08-15
Attending: PEDIATRICS
Payer: COMMERCIAL

## 2022-08-15 VITALS
HEART RATE: 90 BPM | RESPIRATION RATE: 18 BRPM | WEIGHT: 148.81 LBS | BODY MASS INDEX: 24.79 KG/M2 | HEIGHT: 65 IN | DIASTOLIC BLOOD PRESSURE: 60 MMHG | SYSTOLIC BLOOD PRESSURE: 104 MMHG | OXYGEN SATURATION: 99 % | TEMPERATURE: 97.6 F

## 2022-08-15 DIAGNOSIS — C91.00 PHILADELPHIA CHROMOSOME POSITIVE ACUTE LYMPHOBLASTIC LEUKEMIA (HCC): ICD-10-CM

## 2022-08-15 DIAGNOSIS — Z01.89 ENCOUNTER FOR LUMBAR PUNCTURE: ICD-10-CM

## 2022-08-15 DIAGNOSIS — Z51.11 ENCOUNTER FOR ANTINEOPLASTIC CHEMOTHERAPY: ICD-10-CM

## 2022-08-15 DIAGNOSIS — C91.Z0 B LYMPHOBLASTIC LEUKEMIA WITH T(9;22)(Q34;Q11.2);BCR-ABL1 (HCC): ICD-10-CM

## 2022-08-15 DIAGNOSIS — C91.00 ACUTE LYMPHOBLASTIC LEUKEMIA IN ADULT (HCC): ICD-10-CM

## 2022-08-15 LAB
ALBUMIN SERPL BCP-MCNC: 4.7 G/DL (ref 3.2–4.9)
ALBUMIN/GLOB SERPL: 2 G/DL
ALP SERPL-CCNC: 73 U/L (ref 30–99)
ALT SERPL-CCNC: 24 U/L (ref 2–50)
AMORPH CRY #/AREA URNS HPF: PRESENT /HPF
ANION GAP SERPL CALC-SCNC: 13 MMOL/L (ref 7–16)
APPEARANCE UR: ABNORMAL
APPEARANCE UR: CLEAR
APPEARANCE UR: CLEAR
AST SERPL-CCNC: 16 U/L (ref 12–45)
BACTERIA #/AREA URNS HPF: NEGATIVE /HPF
BACTERIA #/AREA URNS HPF: NEGATIVE /HPF
BASOPHILS # BLD AUTO: 0 % (ref 0–1.8)
BASOPHILS # BLD: 0 K/UL (ref 0–0.12)
BILIRUB CONJ SERPL-MCNC: <0.2 MG/DL (ref 0.1–0.5)
BILIRUB INDIRECT SERPL-MCNC: NORMAL MG/DL (ref 0–1)
BILIRUB SERPL-MCNC: 0.6 MG/DL (ref 0.1–1.5)
BILIRUB UR QL STRIP.AUTO: NEGATIVE
BUN SERPL-MCNC: 9 MG/DL (ref 8–22)
CALCIUM SERPL-MCNC: 8.9 MG/DL (ref 8.5–10.5)
CAOX CRY #/AREA URNS HPF: ABNORMAL /HPF
CHLORIDE SERPL-SCNC: 103 MMOL/L (ref 96–112)
CO2 SERPL-SCNC: 22 MMOL/L (ref 20–33)
COLOR UR: ABNORMAL
COLOR UR: YELLOW
COLOR UR: YELLOW
CREAT SERPL-MCNC: 0.66 MG/DL (ref 0.5–1.4)
EOSINOPHIL # BLD AUTO: 0 K/UL (ref 0–0.51)
EOSINOPHIL NFR BLD: 0 % (ref 0–6.9)
EPI CELLS #/AREA URNS HPF: ABNORMAL /HPF
EPI CELLS #/AREA URNS HPF: ABNORMAL /HPF
ERYTHROCYTE [DISTWIDTH] IN BLOOD BY AUTOMATED COUNT: 49.7 FL (ref 35.9–50)
GFR SERPLBLD CREATININE-BSD FMLA CKD-EPI: 137 ML/MIN/1.73 M 2
GLOBULIN SER CALC-MCNC: 2.4 G/DL (ref 1.9–3.5)
GLUCOSE SERPL-MCNC: 124 MG/DL (ref 65–99)
GLUCOSE UR STRIP.AUTO-MCNC: NEGATIVE MG/DL
HCT VFR BLD AUTO: 25.5 % (ref 42–52)
HGB BLD-MCNC: 8.6 G/DL (ref 14–18)
HYALINE CASTS #/AREA URNS LPF: ABNORMAL /LPF
IMM GRANULOCYTES # BLD AUTO: 0 K/UL (ref 0–0.11)
IMM GRANULOCYTES NFR BLD AUTO: 0 % (ref 0–0.9)
KETONES UR STRIP.AUTO-MCNC: ABNORMAL MG/DL
KETONES UR STRIP.AUTO-MCNC: NEGATIVE MG/DL
KETONES UR STRIP.AUTO-MCNC: NEGATIVE MG/DL
LEUKOCYTE ESTERASE UR QL STRIP.AUTO: NEGATIVE
LYMPHOCYTES # BLD AUTO: 0.36 K/UL (ref 1–4.8)
LYMPHOCYTES NFR BLD: 24 % (ref 22–41)
MCH RBC QN AUTO: 29.4 PG (ref 27–33)
MCHC RBC AUTO-ENTMCNC: 33.7 G/DL (ref 33.7–35.3)
MCV RBC AUTO: 87 FL (ref 81.4–97.8)
MICRO URNS: ABNORMAL
MICRO URNS: ABNORMAL
MICRO URNS: NORMAL
MONOCYTES # BLD AUTO: 0.23 K/UL (ref 0–0.85)
MONOCYTES NFR BLD AUTO: 15.3 % (ref 0–13.4)
MUCOUS THREADS #/AREA URNS HPF: ABNORMAL /HPF
NEUTROPHILS # BLD AUTO: 0.91 K/UL (ref 1.82–7.42)
NEUTROPHILS NFR BLD: 60.7 % (ref 44–72)
NITRITE UR QL STRIP.AUTO: NEGATIVE
NRBC # BLD AUTO: 0 K/UL
NRBC BLD-RTO: 0 /100 WBC
PH UR STRIP.AUTO: 6.5 [PH] (ref 5–8)
PH UR STRIP.AUTO: 6.5 [PH] (ref 5–8)
PH UR STRIP.AUTO: 7.5 [PH] (ref 5–8)
PLATELET # BLD AUTO: 181 K/UL (ref 164–446)
PMV BLD AUTO: 8.4 FL (ref 9–12.9)
POTASSIUM SERPL-SCNC: 4.1 MMOL/L (ref 3.6–5.5)
PROT SERPL-MCNC: 7.1 G/DL (ref 6–8.2)
PROT UR QL STRIP: 30 MG/DL
PROT UR QL STRIP: NEGATIVE MG/DL
PROT UR QL STRIP: NEGATIVE MG/DL
RBC # BLD AUTO: 2.93 M/UL (ref 4.7–6.1)
RBC # URNS HPF: ABNORMAL /HPF
RBC # URNS HPF: ABNORMAL /HPF
RBC UR QL AUTO: NEGATIVE
SODIUM SERPL-SCNC: 138 MMOL/L (ref 135–145)
SP GR UR STRIP.AUTO: 1.01
SP GR UR STRIP.AUTO: 1.02
SP GR UR STRIP.AUTO: 1.04
UROBILINOGEN UR STRIP.AUTO-MCNC: 1 MG/DL
WBC # BLD AUTO: 1.5 K/UL (ref 4.8–10.8)
WBC #/AREA URNS HPF: ABNORMAL /HPF
WBC #/AREA URNS HPF: ABNORMAL /HPF

## 2022-08-15 PROCEDURE — 80053 COMPREHEN METABOLIC PANEL: CPT

## 2022-08-15 PROCEDURE — 82248 BILIRUBIN DIRECT: CPT

## 2022-08-15 PROCEDURE — 700105 HCHG RX REV CODE 258: Performed by: PEDIATRICS

## 2022-08-15 PROCEDURE — 81003 URINALYSIS AUTO W/O SCOPE: CPT | Mod: XU

## 2022-08-15 PROCEDURE — 96361 HYDRATE IV INFUSION ADD-ON: CPT

## 2022-08-15 PROCEDURE — 700101 HCHG RX REV CODE 250: Performed by: PEDIATRICS

## 2022-08-15 PROCEDURE — 85025 COMPLETE CBC W/AUTO DIFF WBC: CPT

## 2022-08-15 PROCEDURE — 96360 HYDRATION IV INFUSION INIT: CPT | Mod: XU

## 2022-08-15 PROCEDURE — 99214 OFFICE O/P EST MOD 30 MIN: CPT | Performed by: PEDIATRICS

## 2022-08-15 PROCEDURE — 81001 URINALYSIS AUTO W/SCOPE: CPT | Mod: XU

## 2022-08-15 PROCEDURE — 96413 CHEMO IV INFUSION 1 HR: CPT

## 2022-08-15 PROCEDURE — 96375 TX/PRO/DX INJ NEW DRUG ADDON: CPT

## 2022-08-15 PROCEDURE — 99211 OFF/OP EST MAY X REQ PHY/QHP: CPT | Mod: 25

## 2022-08-15 PROCEDURE — 700111 HCHG RX REV CODE 636 W/ 250 OVERRIDE (IP): Performed by: PEDIATRICS

## 2022-08-15 PROCEDURE — 36592 COLLECT BLOOD FROM PICC: CPT

## 2022-08-15 RX ORDER — ONDANSETRON 2 MG/ML
8 INJECTION INTRAMUSCULAR; INTRAVENOUS EVERY 8 HOURS PRN
Status: DISCONTINUED | OUTPATIENT
Start: 2022-08-15 | End: 2022-08-16 | Stop reason: HOSPADM

## 2022-08-15 RX ORDER — SODIUM CHLORIDE 9 MG/ML
1280 INJECTION, SOLUTION INTRAVENOUS ONCE
Status: COMPLETED | OUTPATIENT
Start: 2022-08-15 | End: 2022-08-15

## 2022-08-15 RX ORDER — LORAZEPAM 2 MG/ML
1 INJECTION INTRAMUSCULAR EVERY 6 HOURS PRN
Status: DISCONTINUED | OUTPATIENT
Start: 2022-08-15 | End: 2022-08-16 | Stop reason: HOSPADM

## 2022-08-15 RX ORDER — DEXTROSE MONOHYDRATE, SODIUM CHLORIDE, AND POTASSIUM CHLORIDE 50; 1.49; 4.5 G/1000ML; G/1000ML; G/1000ML
INJECTION, SOLUTION INTRAVENOUS CONTINUOUS
Status: DISCONTINUED | OUTPATIENT
Start: 2022-08-15 | End: 2022-08-16 | Stop reason: HOSPADM

## 2022-08-15 RX ORDER — SODIUM CHLORIDE 9 MG/ML
20 INJECTION, SOLUTION INTRAVENOUS PRN
Status: DISCONTINUED | OUTPATIENT
Start: 2022-08-15 | End: 2022-08-16 | Stop reason: HOSPADM

## 2022-08-15 RX ORDER — ONDANSETRON 2 MG/ML
8 INJECTION INTRAMUSCULAR; INTRAVENOUS ONCE
Status: COMPLETED | OUTPATIENT
Start: 2022-08-15 | End: 2022-08-15

## 2022-08-15 RX ADMIN — ONDANSETRON 8 MG: 2 INJECTION INTRAMUSCULAR; INTRAVENOUS at 12:35

## 2022-08-15 RX ADMIN — SODIUM CHLORIDE 1280 ML: 9 INJECTION, SOLUTION INTRAVENOUS at 11:00

## 2022-08-15 RX ADMIN — SODIUM CHLORIDE 1280 ML: 9 INJECTION, SOLUTION INTRAVENOUS at 08:09

## 2022-08-15 RX ADMIN — POTASSIUM CHLORIDE, DEXTROSE MONOHYDRATE AND SODIUM CHLORIDE: 150; 5; 450 INJECTION, SOLUTION INTRAVENOUS at 08:10

## 2022-08-15 RX ADMIN — CYCLOPHOSPHAMIDE 1730 MG: 2 INJECTION, POWDER, FOR SOLUTION INTRAVENOUS; ORAL at 12:39

## 2022-08-15 ASSESSMENT — FIBROSIS 4 INDEX: FIB4 SCORE: 0.51

## 2022-08-15 NOTE — PROGRESS NOTES
Pt to [facility] for lab draw, doctors office visit, and chemotherapy administration.      Afebrile.  VSS.  Awake and alert in no acute distress.      PICC line accessed with brisk blood return. Labs drawn from the PICC without difficulty. Dressing changed. Skin intact, no s/s of infection. Pt tolerated well.      Morales from Lab called with critical result of WBC 1.5 at 0858. Critical lab result read back to Morales.   Dr. Faye notified of critical lab result at 0858.  Critical lab result read back by Dr. Faye.    Chemotherapy dosage calculated independently by Tammie Covington RN and Dayna Damian RN and compared to road map for protocol [id number].  Calculations within 10% of written order.  Lab results reviewed.      Premedications and chemo given as ordered, see MAR.  Blood return verified prior to, during, and after chemotherapy infusion.  See Chemotherapy flowsheet.  PT tolerated well.  No side effects or complications noted.  Post hydration completed. PICC line flushed.   Will be notified of next appt d/t new roadmap parameters.

## 2022-08-15 NOTE — PROGRESS NOTES
"Pharmacy Chemotherapy Calculations Note:    Dx: B-ALL, PH+    Cycle: Induction IB Day 28  Previous treatment: Induction IB Days 22-25 on 8/9-8/12/22 (delayed 14 days for counts)    Protocol: Induction 1B per [id number] (on [id number] phase 3 trial starting Induction 1A Part 2 D15)             /89   Pulse (!) 106   Temp 36.5 °C (97.7 °F) (Temporal)   Resp 20   Ht 1.66 m (5' 5.35\")   Wt 67.5 kg (148 lb 13 oz)   SpO2 97%   BMI 24.50 kg/m²  Body surface area is 1.76 meters squared.  Induction IB Dosing values:   Ht 167.5 cm Wt 64 kg BSA 1.73 m²     Labs 8/15/22:  ANC~ 910 Plt = 181k   Hgb = 8.6     SCr = 0.66 mg/dL CrCl > 125 mL/min (min Scr 0.7 used)  AST/ALT/AP = 16/24/73 TBili = 0.6  DBili < 0.2     Urine SpG = 1.010       Cyclophosphamide 1000 mg/m² x 1.76 m² = 1760 mg              < 10 % difference, ok to treat with final dose  = 1730 mg IV             **DO not start until after at least 2 hours of PRE-hydration AND urine SpG < 1.010**    Marifer Torres, PharmD, BCOP                       "

## 2022-08-16 ENCOUNTER — TELEPHONE (OUTPATIENT)
Dept: INFUSION CENTER | Facility: MEDICAL CENTER | Age: 22
End: 2022-08-16
Payer: COMMERCIAL

## 2022-08-16 NOTE — TELEPHONE ENCOUNTER
Discharge phone call to pt re: pts visit on 8/16/22. Parent reports pt is doing well.  No questions or concerns at this time.

## 2022-08-17 DIAGNOSIS — C91.Z0 B LYMPHOBLASTIC LEUKEMIA WITH T(9;22)(Q34;Q11.2);BCR-ABL1 (HCC): ICD-10-CM

## 2022-08-17 RX ORDER — SODIUM CHLORIDE 0.9 % (FLUSH) 0.9 %
3 SYRINGE (ML) INJECTION 2 TIMES DAILY
Qty: 600 ML | Refills: 0 | Status: SHIPPED | OUTPATIENT
Start: 2022-08-17 | End: 2022-12-27

## 2022-08-17 NOTE — PROGRESS NOTES
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 platelets 181             - ,    - Creatinine 0.66   - ALT 24, AST 16, direct bilirubin <0.2                - No dose-limiting toxicities, proceed with Day 28 therapy as scheduled                         Ph+ Acute B-Lymphoblastic Leukemia, 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, Induction IB, 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:                          ** Cyclophosphamide 1730 mg IV x 1 dose on Day 29                          ** Continue Imatinib 600 mg  PO daily (100% compliant)                           ** Scheduled to complete mercaptopurine 100 mg PO daily Tues-Sun and 125 mg daily on Mon this evening (100% compliant with all doses of 6-MP stop date 8/15/2022)                  - Return to clinic tomorrow for Induction IB, Day 42 evaluations                          2) Chemotherapy Related Pancytopenia:             - WBC 1.5, Hgb 8.6, platelets 181             - ,                 - Not complaining of any symptomatic anemia. Rransfuse PRBCs for Hgb < 7 or symptomatic             - Transfuse platelets for platelets < 10K or active bleeding     3)  Chemotherapy Induced Nausea and Vomiting:              - Well-controlled at home with Zofran              - Zofran, Ativan PRN at home     4) Sixth Cranial Nerve Palsy (IMPROVED/RESOLVED):             - Followed by Dr. Carranza             - Improvement/Resolution of palsy, still treating some astigmatism      5) At Risk of Opportunistic Lung Infection:             - Continue Bactrim SS 2 tabs PO BID on Sat/Sun     6) Anxiety (IMPROVED GREATLY):     7) Social:             - Continue with support             - Discussed return to school and activity, recommendations will depend loosely on randomization following Induction IB     8) Access:               - R PICC placed, C/D/I, BID flushes              - Will consider replacing with Port-a-cath after End of Induction IB bone marrow evaluations      9) Research Participant:           [facility] - Source Data       Diagnosis: Ph+ Precursor B-Cell Acute Lymphoblastic Leukemia     Disease Status: EOI1A MRD NEGATIVE, CNS3c, testicular negative, HSV1 IgG POSITIVE, CMV IgG NEGATIVE, VARICELLA IgG POSITIVE     Active Studies: [id number], [id number]                                                                                                      Inactive Studies: [id number]                                                                                                                                                Optional Studies: None             Protocol: International Phase 3 trial in [place] 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      Treatment Plan:   [id number](OS), Induction 1B, Day 28      Height: 1.675 m      Weight: 64 kg       BSA: 1.73 m²   (Start of Induction 1B 7/5/2022)                                                                                                                                               Performance Status: Karnofsky  90, ECOG  1       Current Therapeutic Medications:  (verified 100% compliance)     Imatinib 600 mg PO daily (start 6/3/2022 - weight verified 8/15/2022, no change in dose necessary)   Mercaptopurine 100 mg PO Tues-Sun and 125 mg Mon (start 7/5/2022, HELD 7/26/2022 - 8/9/2022, complete 8/15/2022 - 28 doses)     Evaluations / Study Labs (obtained 8/15/2022):     WBC 1.5, Hgb 8.6, platelets 181  ,   Creatinine 0.66  ALT 24, AST 16, direct bilirubin <0.2     Therapy Given (8/15/2022):  Cyclophosphamide 1730 mg IV  Continue imatinib 600 mg p.o. daily  Complete mercaptopurine 100 mg Tues-Sun and 125 mg Mon this evening       Disposition: Return to clinic 14 days for Day 42 evaluations    Pepe Faye MD  Pediatric Hematology / Oncology  [facility]  Cell.  [phone]  [facility]. [phone]

## 2022-08-22 ENCOUNTER — HOSPITAL ENCOUNTER (OUTPATIENT)
Dept: INFUSION CENTER | Facility: MEDICAL CENTER | Age: 22
End: 2022-08-22
Attending: PEDIATRICS
Payer: COMMERCIAL

## 2022-08-22 ENCOUNTER — PATIENT OUTREACH (OUTPATIENT)
Dept: PEDIATRIC HEMATOLOGY/ONCOLOGY | Facility: OUTPATIENT CENTER | Age: 22
End: 2022-08-22
Payer: COMMERCIAL

## 2022-08-22 DIAGNOSIS — C91.00 PHILADELPHIA CHROMOSOME POSITIVE ACUTE LYMPHOBLASTIC LEUKEMIA (HCC): ICD-10-CM

## 2022-08-22 PROCEDURE — RXMED WILLOW AMBULATORY MEDICATION CHARGE: Performed by: PEDIATRICS

## 2022-08-22 PROCEDURE — 99211 OFF/OP EST MAY X REQ PHY/QHP: CPT

## 2022-08-22 NOTE — PROGRESS NOTES
Medical Social Work    REGIS contacted TERESA to discuss outstanding medical bills she had some concerns with. REGIS reviewed patient's chart, and only amount that is seen pending is $25. This amount will likely be covered by patient's Medicaid secondary coverage.     MOP confirmed having some bills she needed to call about because she believes they do not have patient's secondary coverage. MOP could not remember who she received the bills from, but stated it was not [facility]. [facility] stated she has only received EOB's from [facility].     Plan: [facility] is going to email copies of the other bills to REGIS for review. REGIS will wait for email, and continue to assist family as needed.

## 2022-08-22 NOTE — PROGRESS NOTES
Pt to [facility] for PICC line dressing change. Awake and alert in no acute distress.      R upper arm PICC line in place. Dressing lifting on outside edges.     Clave and dressing changed using sterile technique. Biopatch and stat-lock in place.  No signs or symptoms of infection at this time.     Line flushed with NS.     Pt home with self. Next appointment scheduled for 8/26/22 for labs.

## 2022-08-23 ENCOUNTER — TELEPHONE (OUTPATIENT)
Dept: INFUSION CENTER | Facility: MEDICAL CENTER | Age: 22
End: 2022-08-23
Payer: COMMERCIAL

## 2022-08-23 NOTE — TELEPHONE ENCOUNTER
Call placed to patient. Ensured patient doing well from previous visit. Discussed any concerns or questions with patient, none at this time. Patient happy with care he received.

## 2022-08-26 ENCOUNTER — PHARMACY VISIT (OUTPATIENT)
Dept: PHARMACY | Facility: MEDICAL CENTER | Age: 22
End: 2022-08-26
Payer: COMMERCIAL

## 2022-08-26 ENCOUNTER — HOSPITAL ENCOUNTER (OUTPATIENT)
Dept: INFUSION CENTER | Facility: MEDICAL CENTER | Age: 22
End: 2022-08-26
Attending: PEDIATRICS
Payer: COMMERCIAL

## 2022-08-26 DIAGNOSIS — C91.00 ACUTE LYMPHOBLASTIC LEUKEMIA IN ADULT (HCC): ICD-10-CM

## 2022-08-26 LAB
ANISOCYTOSIS BLD QL SMEAR: ABNORMAL
BASOPHILS # BLD AUTO: 0.9 % (ref 0–1.8)
BASOPHILS # BLD: 0.01 K/UL (ref 0–0.12)
EOSINOPHIL # BLD AUTO: 0 K/UL (ref 0–0.51)
EOSINOPHIL NFR BLD: 0 % (ref 0–6.9)
ERYTHROCYTE [DISTWIDTH] IN BLOOD BY AUTOMATED COUNT: 52.2 FL (ref 35.9–50)
HCT VFR BLD AUTO: 22.2 % (ref 42–52)
HGB BLD-MCNC: 7.3 G/DL (ref 14–18)
LYMPHOCYTES # BLD AUTO: 0.27 K/UL (ref 1–4.8)
LYMPHOCYTES NFR BLD: 33.9 % (ref 22–41)
MACROCYTES BLD QL SMEAR: ABNORMAL
MANUAL DIFF BLD: NORMAL
MCH RBC QN AUTO: 30.5 PG (ref 27–33)
MCHC RBC AUTO-ENTMCNC: 32.9 G/DL (ref 33.7–35.3)
MCV RBC AUTO: 92.9 FL (ref 81.4–97.8)
MICROCYTES BLD QL SMEAR: ABNORMAL
MONOCYTES # BLD AUTO: 0.1 K/UL (ref 0–0.85)
MONOCYTES NFR BLD AUTO: 12.2 % (ref 0–13.4)
MORPHOLOGY BLD-IMP: NORMAL
NEUTROPHILS # BLD AUTO: 0.42 K/UL (ref 1.82–7.42)
NEUTROPHILS NFR BLD: 53 % (ref 44–72)
NRBC # BLD AUTO: 0.03 K/UL
NRBC BLD-RTO: 3.7 /100 WBC
PLATELET # BLD AUTO: 311 K/UL (ref 164–446)
PLATELET BLD QL SMEAR: NORMAL
PMV BLD AUTO: 9.6 FL (ref 9–12.9)
POIKILOCYTOSIS BLD QL SMEAR: NORMAL
POLYCHROMASIA BLD QL SMEAR: NORMAL
RBC # BLD AUTO: 2.39 M/UL (ref 4.7–6.1)
RBC BLD AUTO: PRESENT
SCHISTOCYTES BLD QL SMEAR: NORMAL
WBC # BLD AUTO: 0.8 K/UL (ref 4.8–10.8)

## 2022-08-26 PROCEDURE — 99211 OFF/OP EST MAY X REQ PHY/QHP: CPT

## 2022-08-26 PROCEDURE — 85025 COMPLETE CBC W/AUTO DIFF WBC: CPT

## 2022-08-26 PROCEDURE — 85007 BL SMEAR W/DIFF WBC COUNT: CPT

## 2022-08-26 PROCEDURE — 36592 COLLECT BLOOD FROM PICC: CPT

## 2022-08-26 NOTE — PROGRESS NOTES
Pt to [facility] for PICC line dressing change and lab draw. Awake and alert in no acute distress.      Labs drawn from PICC line and sent.     R upper arm PICC line in place. Dressing lifting on outside edges.     Clave and dressing changed using sterile technique. Biopatch and stat-lock in place.  No signs or symptoms of infection at this time.     Line flushed with NS.     Pt home with self. Will call for next appt.

## 2022-08-29 ENCOUNTER — ANESTHESIA EVENT (OUTPATIENT)
Dept: SURGERY | Facility: MEDICAL CENTER | Age: 22
End: 2022-08-29
Payer: COMMERCIAL

## 2022-08-29 ENCOUNTER — APPOINTMENT (OUTPATIENT)
Dept: RADIOLOGY | Facility: MEDICAL CENTER | Age: 22
End: 2022-08-29
Attending: SURGERY
Payer: COMMERCIAL

## 2022-08-29 ENCOUNTER — HOSPITAL ENCOUNTER (OUTPATIENT)
Facility: MEDICAL CENTER | Age: 22
End: 2022-08-29
Attending: SURGERY | Admitting: SURGERY
Payer: COMMERCIAL

## 2022-08-29 ENCOUNTER — TELEPHONE (OUTPATIENT)
Dept: INFUSION CENTER | Facility: MEDICAL CENTER | Age: 22
End: 2022-08-29
Payer: COMMERCIAL

## 2022-08-29 ENCOUNTER — HOSPITAL ENCOUNTER (OUTPATIENT)
Dept: INFUSION CENTER | Facility: MEDICAL CENTER | Age: 22
End: 2022-08-29
Attending: PEDIATRICS
Payer: COMMERCIAL

## 2022-08-29 ENCOUNTER — ANESTHESIA (OUTPATIENT)
Dept: SURGERY | Facility: MEDICAL CENTER | Age: 22
End: 2022-08-29
Payer: COMMERCIAL

## 2022-08-29 VITALS
HEIGHT: 65 IN | BODY MASS INDEX: 25.31 KG/M2 | TEMPERATURE: 97.2 F | HEART RATE: 74 BPM | DIASTOLIC BLOOD PRESSURE: 55 MMHG | WEIGHT: 151.9 LBS | SYSTOLIC BLOOD PRESSURE: 114 MMHG | RESPIRATION RATE: 16 BRPM | OXYGEN SATURATION: 98 %

## 2022-08-29 DIAGNOSIS — Z01.89 ENCOUNTER FOR LUMBAR PUNCTURE: ICD-10-CM

## 2022-08-29 DIAGNOSIS — C91.Z0 B LYMPHOBLASTIC LEUKEMIA WITH T(9;22)(Q34;Q11.2);BCR-ABL1 (HCC): ICD-10-CM

## 2022-08-29 DIAGNOSIS — C91.00 PHILADELPHIA CHROMOSOME POSITIVE ACUTE LYMPHOBLASTIC LEUKEMIA (HCC): ICD-10-CM

## 2022-08-29 DIAGNOSIS — Z51.11 ENCOUNTER FOR ANTINEOPLASTIC CHEMOTHERAPY: ICD-10-CM

## 2022-08-29 LAB
ANISOCYTOSIS BLD QL SMEAR: ABNORMAL
BASOPHILS # BLD AUTO: 0 % (ref 0–1.8)
BASOPHILS # BLD: 0 K/UL (ref 0–0.12)
DACRYOCYTES BLD QL SMEAR: NORMAL
EOSINOPHIL # BLD AUTO: 0.03 K/UL (ref 0–0.51)
EOSINOPHIL NFR BLD: 4.3 % (ref 0–6.9)
ERYTHROCYTE [DISTWIDTH] IN BLOOD BY AUTOMATED COUNT: 88.4 FL (ref 35.9–50)
HCT VFR BLD AUTO: 28 % (ref 42–52)
HGB BLD-MCNC: 9 G/DL (ref 14–18)
LYMPHOCYTES # BLD AUTO: 0.27 K/UL (ref 1–4.8)
LYMPHOCYTES NFR BLD: 38.3 % (ref 22–41)
MACROCYTES BLD QL SMEAR: ABNORMAL
MANUAL DIFF BLD: NORMAL
MCH RBC QN AUTO: 31.5 PG (ref 27–33)
MCHC RBC AUTO-ENTMCNC: 32.1 G/DL (ref 33.7–35.3)
MCV RBC AUTO: 97.9 FL (ref 81.4–97.8)
MICROCYTES BLD QL SMEAR: ABNORMAL
MONOCYTES # BLD AUTO: 0.17 K/UL (ref 0–0.85)
MONOCYTES NFR BLD AUTO: 24.8 % (ref 0–13.4)
MORPHOLOGY BLD-IMP: NORMAL
NEUTROPHILS # BLD AUTO: 0.23 K/UL (ref 1.82–7.42)
NEUTROPHILS NFR BLD: 31.9 % (ref 44–72)
NEUTS BAND NFR BLD MANUAL: 0.7 % (ref 0–10)
NRBC # BLD AUTO: 0 K/UL
NRBC BLD-RTO: 0 /100 WBC
OVALOCYTES BLD QL SMEAR: NORMAL
PLATELET # BLD AUTO: 403 K/UL (ref 164–446)
PLATELET BLD QL SMEAR: NORMAL
PMV BLD AUTO: 9.3 FL (ref 9–12.9)
POIKILOCYTOSIS BLD QL SMEAR: NORMAL
POLYCHROMASIA BLD QL SMEAR: NORMAL
RBC # BLD AUTO: 2.86 M/UL (ref 4.7–6.1)
RBC BLD AUTO: PRESENT
WBC # BLD AUTO: 0.7 K/UL (ref 4.8–10.8)

## 2022-08-29 PROCEDURE — 71045 X-RAY EXAM CHEST 1 VIEW: CPT

## 2022-08-29 PROCEDURE — 700111 HCHG RX REV CODE 636 W/ 250 OVERRIDE (IP): Performed by: ANESTHESIOLOGY

## 2022-08-29 PROCEDURE — 700101 HCHG RX REV CODE 250: Performed by: SURGERY

## 2022-08-29 PROCEDURE — 85025 COMPLETE CBC W/AUTO DIFF WBC: CPT

## 2022-08-29 PROCEDURE — 160036 HCHG PACU - EA ADDL 30 MINS PHASE I: Performed by: SURGERY

## 2022-08-29 PROCEDURE — 00532 ANES ACCESS CTR VENOUS CRCJ: CPT | Performed by: ANESTHESIOLOGY

## 2022-08-29 PROCEDURE — 700102 HCHG RX REV CODE 250 W/ 637 OVERRIDE(OP): Performed by: ANESTHESIOLOGY

## 2022-08-29 PROCEDURE — 160002 HCHG RECOVERY MINUTES (STAT): Performed by: SURGERY

## 2022-08-29 PROCEDURE — C1788 PORT, INDWELLING, IMP: HCPCS | Performed by: SURGERY

## 2022-08-29 PROCEDURE — 160035 HCHG PACU - 1ST 60 MINS PHASE I: Performed by: SURGERY

## 2022-08-29 PROCEDURE — 700105 HCHG RX REV CODE 258: Performed by: SURGERY

## 2022-08-29 PROCEDURE — 160046 HCHG PACU - 1ST 60 MINS PHASE II: Performed by: SURGERY

## 2022-08-29 PROCEDURE — 160009 HCHG ANES TIME/MIN: Performed by: SURGERY

## 2022-08-29 PROCEDURE — A9270 NON-COVERED ITEM OR SERVICE: HCPCS | Performed by: ANESTHESIOLOGY

## 2022-08-29 PROCEDURE — 160048 HCHG OR STATISTICAL LEVEL 1-5: Performed by: SURGERY

## 2022-08-29 PROCEDURE — 160025 RECOVERY II MINUTES (STATS): Performed by: SURGERY

## 2022-08-29 PROCEDURE — 36415 COLL VENOUS BLD VENIPUNCTURE: CPT

## 2022-08-29 PROCEDURE — 700111 HCHG RX REV CODE 636 W/ 250 OVERRIDE (IP): Performed by: SURGERY

## 2022-08-29 PROCEDURE — 85007 BL SMEAR W/DIFF WBC COUNT: CPT

## 2022-08-29 PROCEDURE — 160028 HCHG SURGERY MINUTES - 1ST 30 MINS LEVEL 3: Performed by: SURGERY

## 2022-08-29 PROCEDURE — 700101 HCHG RX REV CODE 250: Performed by: ANESTHESIOLOGY

## 2022-08-29 DEVICE — POWER PORTMRI COMPATABLE: Type: IMPLANTABLE DEVICE | Status: FUNCTIONAL

## 2022-08-29 RX ORDER — CEFAZOLIN SODIUM 1 G/3ML
INJECTION, POWDER, FOR SOLUTION INTRAMUSCULAR; INTRAVENOUS PRN
Status: DISCONTINUED | OUTPATIENT
Start: 2022-08-29 | End: 2022-08-29 | Stop reason: SURG

## 2022-08-29 RX ORDER — SODIUM CHLORIDE 9 MG/ML
INJECTION, SOLUTION INTRAVENOUS CONTINUOUS
Status: DISCONTINUED | OUTPATIENT
Start: 2022-08-29 | End: 2022-08-29 | Stop reason: HOSPADM

## 2022-08-29 RX ORDER — ALBUTEROL SULFATE 2.5 MG/3ML
2.5 SOLUTION RESPIRATORY (INHALATION)
Status: DISCONTINUED | OUTPATIENT
Start: 2022-08-29 | End: 2022-08-29 | Stop reason: HOSPADM

## 2022-08-29 RX ORDER — HYDROMORPHONE HYDROCHLORIDE 1 MG/ML
0.1 INJECTION, SOLUTION INTRAMUSCULAR; INTRAVENOUS; SUBCUTANEOUS
Status: DISCONTINUED | OUTPATIENT
Start: 2022-08-29 | End: 2022-08-29 | Stop reason: HOSPADM

## 2022-08-29 RX ORDER — LIDOCAINE HYDROCHLORIDE 20 MG/ML
INJECTION, SOLUTION EPIDURAL; INFILTRATION; INTRACAUDAL; PERINEURAL PRN
Status: DISCONTINUED | OUTPATIENT
Start: 2022-08-29 | End: 2022-08-29 | Stop reason: SURG

## 2022-08-29 RX ORDER — BUPIVACAINE HYDROCHLORIDE 2.5 MG/ML
INJECTION, SOLUTION EPIDURAL; INFILTRATION; INTRACAUDAL
Status: DISCONTINUED | OUTPATIENT
Start: 2022-08-29 | End: 2022-08-29 | Stop reason: HOSPADM

## 2022-08-29 RX ORDER — OXYCODONE HCL 5 MG/5 ML
5 SOLUTION, ORAL ORAL
Status: COMPLETED | OUTPATIENT
Start: 2022-08-29 | End: 2022-08-29

## 2022-08-29 RX ORDER — HYDRALAZINE HYDROCHLORIDE 20 MG/ML
5 INJECTION INTRAMUSCULAR; INTRAVENOUS
Status: DISCONTINUED | OUTPATIENT
Start: 2022-08-29 | End: 2022-08-29 | Stop reason: HOSPADM

## 2022-08-29 RX ORDER — MIDAZOLAM HYDROCHLORIDE 1 MG/ML
INJECTION INTRAMUSCULAR; INTRAVENOUS PRN
Status: DISCONTINUED | OUTPATIENT
Start: 2022-08-29 | End: 2022-08-29 | Stop reason: SURG

## 2022-08-29 RX ORDER — LABETALOL HYDROCHLORIDE 5 MG/ML
5 INJECTION, SOLUTION INTRAVENOUS
Status: DISCONTINUED | OUTPATIENT
Start: 2022-08-29 | End: 2022-08-29 | Stop reason: HOSPADM

## 2022-08-29 RX ORDER — HALOPERIDOL 5 MG/ML
1 INJECTION INTRAMUSCULAR
Status: DISCONTINUED | OUTPATIENT
Start: 2022-08-29 | End: 2022-08-29 | Stop reason: HOSPADM

## 2022-08-29 RX ORDER — HYDROMORPHONE HYDROCHLORIDE 1 MG/ML
0.4 INJECTION, SOLUTION INTRAMUSCULAR; INTRAVENOUS; SUBCUTANEOUS
Status: DISCONTINUED | OUTPATIENT
Start: 2022-08-29 | End: 2022-08-29 | Stop reason: HOSPADM

## 2022-08-29 RX ORDER — SODIUM CHLORIDE, SODIUM LACTATE, POTASSIUM CHLORIDE, CALCIUM CHLORIDE 600; 310; 30; 20 MG/100ML; MG/100ML; MG/100ML; MG/100ML
INJECTION, SOLUTION INTRAVENOUS CONTINUOUS
Status: DISCONTINUED | OUTPATIENT
Start: 2022-08-29 | End: 2022-08-29 | Stop reason: HOSPADM

## 2022-08-29 RX ORDER — MEPERIDINE HYDROCHLORIDE 25 MG/ML
6.25 INJECTION INTRAMUSCULAR; INTRAVENOUS; SUBCUTANEOUS
Status: DISCONTINUED | OUTPATIENT
Start: 2022-08-29 | End: 2022-08-29 | Stop reason: HOSPADM

## 2022-08-29 RX ORDER — OXYCODONE HCL 5 MG/5 ML
10 SOLUTION, ORAL ORAL
Status: COMPLETED | OUTPATIENT
Start: 2022-08-29 | End: 2022-08-29

## 2022-08-29 RX ORDER — MERCAPTOPURINE 50 MG/1
50 TABLET ORAL DAILY
Status: ON HOLD | COMMUNITY
End: 2022-10-02

## 2022-08-29 RX ORDER — ONDANSETRON 2 MG/ML
INJECTION INTRAMUSCULAR; INTRAVENOUS PRN
Status: DISCONTINUED | OUTPATIENT
Start: 2022-08-29 | End: 2022-08-29 | Stop reason: SURG

## 2022-08-29 RX ORDER — DIPHENHYDRAMINE HYDROCHLORIDE 50 MG/ML
12.5 INJECTION INTRAMUSCULAR; INTRAVENOUS
Status: DISCONTINUED | OUTPATIENT
Start: 2022-08-29 | End: 2022-08-29 | Stop reason: HOSPADM

## 2022-08-29 RX ORDER — HYDROMORPHONE HYDROCHLORIDE 1 MG/ML
0.2 INJECTION, SOLUTION INTRAMUSCULAR; INTRAVENOUS; SUBCUTANEOUS
Status: DISCONTINUED | OUTPATIENT
Start: 2022-08-29 | End: 2022-08-29 | Stop reason: HOSPADM

## 2022-08-29 RX ORDER — ONDANSETRON 2 MG/ML
4 INJECTION INTRAMUSCULAR; INTRAVENOUS
Status: DISCONTINUED | OUTPATIENT
Start: 2022-08-29 | End: 2022-08-29 | Stop reason: HOSPADM

## 2022-08-29 RX ADMIN — OXYCODONE HYDROCHLORIDE 10 MG: 5 SOLUTION ORAL at 14:04

## 2022-08-29 RX ADMIN — LIDOCAINE HYDROCHLORIDE 50 MG: 20 INJECTION, SOLUTION EPIDURAL; INFILTRATION; INTRACAUDAL at 13:14

## 2022-08-29 RX ADMIN — SODIUM CHLORIDE, POTASSIUM CHLORIDE, SODIUM LACTATE AND CALCIUM CHLORIDE: 600; 310; 30; 20 INJECTION, SOLUTION INTRAVENOUS at 12:45

## 2022-08-29 RX ADMIN — FENTANYL CITRATE 25 MCG: 50 INJECTION, SOLUTION INTRAMUSCULAR; INTRAVENOUS at 14:12

## 2022-08-29 RX ADMIN — CEFAZOLIN 2 G: 330 INJECTION, POWDER, FOR SOLUTION INTRAMUSCULAR; INTRAVENOUS at 13:14

## 2022-08-29 RX ADMIN — FENTANYL CITRATE 50 MCG: 50 INJECTION, SOLUTION INTRAMUSCULAR; INTRAVENOUS at 13:23

## 2022-08-29 RX ADMIN — ONDANSETRON 4 MG: 2 INJECTION INTRAMUSCULAR; INTRAVENOUS at 13:20

## 2022-08-29 RX ADMIN — MIDAZOLAM HYDROCHLORIDE 2 MG: 1 INJECTION, SOLUTION INTRAMUSCULAR; INTRAVENOUS at 13:10

## 2022-08-29 RX ADMIN — PROPOFOL 200 MG: 10 INJECTION, EMULSION INTRAVENOUS at 13:14

## 2022-08-29 RX ADMIN — FENTANYL CITRATE 25 MCG: 50 INJECTION, SOLUTION INTRAMUSCULAR; INTRAVENOUS at 14:02

## 2022-08-29 RX ADMIN — FENTANYL CITRATE 50 MCG: 50 INJECTION, SOLUTION INTRAMUSCULAR; INTRAVENOUS at 13:14

## 2022-08-29 RX ADMIN — LIDOCAINE HYDROCHLORIDE 0.5 ML: 10 INJECTION, SOLUTION EPIDURAL; INFILTRATION; INTRACAUDAL; PERINEURAL at 12:35

## 2022-08-29 ASSESSMENT — PAIN DESCRIPTION - PAIN TYPE
TYPE: SURGICAL PAIN

## 2022-08-29 ASSESSMENT — FIBROSIS 4 INDEX
FIB4 SCORE: 0.16
FIB4 SCORE: 0.16

## 2022-08-29 NOTE — OR NURSING
Patient recovered well post op. A&Ox4. VSS, room air . Surgical sites CDI. Surgical pain managed. Patient able to drink fluids without Nausea and vomiting. PT belongings in phase two. Spouse updated and discussed plan of care. Report called to phase two Laurie MARIANO.

## 2022-08-29 NOTE — DISCHARGE INSTRUCTIONS
HOME CARE INSTRUCTIONS    ACTIVITY: Rest and take it easy for the first 24 hours.  A responsible adult is recommended to remain with you during that time.  It is normal to feel sleepy.  We encourage you to not do anything that requires balance, judgment or coordination.    FOR 24 HOURS DO NOT:  Drive, operate machinery or run household appliances.  Drink beer or alcoholic beverages.  Make important decisions or sign legal documents.    SPECIAL INSTRUCTIONS: May remove dressings on 8/31/22.     Implanted Port Insertion, Care After  This sheet gives you information about how to care for yourself after your procedure. Your health care provider may also give you more specific instructions. If you have problems or questions, contact your health care provider.  What can I expect after the procedure?  After the procedure, it is common to have:  Discomfort at the port insertion site.  Bruising on the skin over the port. This should improve over 3-4 days.  Follow these instructions at home:  Port care  After your port is placed, you will get a 's information card. The card has information about your port. Keep this card with you at all times.  Take care of the port as told by your health care provider. Ask your health care provider if you or a family member can get training for taking care of the port at home. A home health care nurse may also take care of the port.  Make sure to remember what type of port you have.  Incision care  Follow instructions from your health care provider about how to take care of your port insertion site. Make sure you:  Wash your hands with soap and water before and after you change your bandage (dressing). If soap and water are not available, use hand .  Change your dressing as told by your health care provider.  Leave stitches (sutures), skin glue, or adhesive strips in place. These skin closures may need to stay in place for 2 weeks or longer. If adhesive strip edges start  to loosen and curl up, you may trim the loose edges. Do not remove adhesive strips completely unless your health care provider tells you to do that.  Check your port insertion site every day for signs of infection. Check for:  Redness, swelling, or pain.  Fluid or blood.  Warmth.  Pus or a bad smell.  Activity  Return to your normal activities as told by your health care provider. Ask your health care provider what activities are safe for you.  Do not lift anything that is heavier than 10 lb (4.5 kg), or the limit that you are told, until your health care provider says that it is safe.  General instructions  Take over-the-counter and prescription medicines only as told by your health care provider.  Do not take baths, swim, or use a hot tub until your health care provider approves. Ask your health care provider if you may take showers. You may only be allowed to take sponge baths.  Do not drive for 24 hours if you were given a sedative during your procedure.  Wear a medical alert bracelet in case of an emergency. This will tell any health care providers that you have a port.  Keep all follow-up visits as told by your health care provider. This is important.  Contact a health care provider if:  You cannot flush your port with saline as directed, or you cannot draw blood from the port.  You have a fever or chills.  You have redness, swelling, or pain around your port insertion site.  You have fluid or blood coming from your port insertion site.  Your port insertion site feels warm to the touch.  You have pus or a bad smell coming from the port insertion site.  Get help right away if:  You have chest pain or shortness of breath.  You have bleeding from your port that you cannot control.  Summary  Take care of the port as told by your health care provider. Keep the 's information card with you at all times.  Change your dressing as told by your health care provider.  Contact a health care provider if you  have a fever or chills or if you have redness, swelling, or pain around your port insertion site.  Keep all follow-up visits as told by your health care provider.  This information is not intended to replace advice given to you by your health care provider. Make sure you discuss any questions you have with your health care provider.    DIET: To avoid nausea, slowly advance diet as tolerated, avoiding spicy or greasy foods for the first day.  Add more substantial food to your diet according to your physician's instructions.  Babies can be fed formula or breast milk as soon as they are hungry.  INCREASE FLUIDS AND FIBER TO AVOID CONSTIPATION.    SURGICAL DRESSING/BATHING: May remove dressings on 8/31/22 and shower. Do not submerge in water such as bath tubs, hot tubs, or swimming pools until cleared by your provider.       MEDICATIONS: Resume taking daily medication.  Take prescribed pain medication with food.  If no medication is prescribed, you may take non-aspirin pain medication if needed.  PAIN MEDICATION CAN BE VERY CONSTIPATING.  Take a stool softener or laxative such as senokot, pericolace, or milk of magnesia if needed.    Prescription given for:    Last pain medication given at 2:04pm oxycodone.    A follow-up appointment should be arranged with your doctor; call to schedule.    You should CALL YOUR PHYSICIAN if you develop:  Fever greater than 101 degrees F.  Pain not relieved by medication, or persistent nausea or vomiting.  Excessive bleeding (blood soaking through dressing) or unexpected drainage from the wound.  Extreme redness or swelling around the incision site, drainage of pus or foul smelling drainage.  Inability to urinate or empty your bladder within 8 hours.  Problems with breathing or chest pain.    You should call 911 if you develop problems with breathing or chest pain.  If you are unable to contact your doctor or surgical center, you should go to the nearest emergency room or urgent care  center.    Physician's telephone #: [phone] Dr. Moise    MILD FLU-LIKE SYMPTOMS ARE NORMAL.  YOU MAY EXPERIENCE GENERALIZED MUSCLE ACHES, THROAT IRRITATION, HEADACHE AND/OR SOME NAUSEA.    If any questions arise, call your doctor.  If your doctor is not available, please feel free to call the Surgical Center at [phone].  The Center is open Monday through Friday from 7AM to 7PM.      A registered nurse may call you a few days after your surgery to see how you are doing after your procedure.    You may also receive a survey in the mail within the next two weeks and we ask that you take a few moments to complete the survey and return it to us.  Our goal is to provide you with very good care and we value your comments.

## 2022-08-29 NOTE — ANESTHESIA PROCEDURE NOTES
Airway    Date/Time: 8/29/2022 1:15 PM  Performed by: Bienvenido Gusman M.D.  Authorized by: Bienvenido Gusman M.D.     Location:  OR  Urgency:  Elective  Indications for Airway Management:  Anesthesia      Spontaneous Ventilation: absent    Sedation Level:  Deep  Preoxygenated: Yes    Final Airway Type:  Supraglottic airway  Final Supraglottic Airway:  Standard LMA    SGA Size:  4  Number of Attempts at Approach:  1

## 2022-08-29 NOTE — OP REPORT
This version of the note has been redacted during the course of a chart correction case. If you need access to the original text of this version of the note, please contact the Health Information Management department at [phone].

## 2022-08-29 NOTE — OR NURSING
Pt recovered uneventfully through phase 2. VSS. Pt rates pain as 5/10 tolerable but sore. Denies nausea, tolerating oral fluids and responding appropriately. Op site CDI. Written discharge instructions reviewed and provided to patient and patient's mother, both verbalized understanding of instructions and denies any questions or concerns. Physician's contact information provided. IV d/c'd. All belongings returned. Pt discharged via wheelchair to private vehicle at [facility].

## 2022-08-29 NOTE — ANESTHESIA TIME REPORT
Anesthesia Start and Stop Event Times     Date Time Event    8/29/2022 1310 Anesthesia Start     1340 Anesthesia Stop        Responsible Staff  08/29/22    Name Role Begin End    Bienvenido Gusman M.D. Anesth 1310 1340        Overtime Reason:  no overtime (within assigned shift)    Comments:

## 2022-08-29 NOTE — H&P
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                                                                                                                                                Optional Studies: None             Protocol: International Phase 3 trial in [place] 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      Treatment Plan:   YROG2558(OS), Induction IB, Day 42     Height: 1.675 m      Weight: 64 kg       BSA: 1.73 m²   (Start of Induction 1B 7/5/2022)                                                                                                                                               Performance Status: Karnofsky 90, ECOG  1 (very minimal restriction)      Current Therapeutic Medications:  (verified 100% compliance)     Imatinib 600 mg PO daily (start 6/3/2022)      Evaluations / Study Labs (obtained 8/29/2022):     WBC 1.5, Hgb 8.6, platelets 181  ,   Creatinine 0.66  ALT 24, AST 16, direct bilirubin <0.2     Therapy Given (8/29/2022):    None        Disposition: To OR for line placement and possible bone marrow  evaluations PENDING ANC    Pepe Faye MD  Pediatric Hematology / Oncology  [facility]  Cell.  [phone]  [facility]. [phone]

## 2022-08-29 NOTE — OP REPORT
DATE OF SERVICE:  08/29/2022     PREOPERATIVE DIAGNOSIS:  Acute leukemia.     POSTOPERATIVE DIAGNOSIS:  Acute leukemia.     PROCEDURE:  Port-A-Cath placed in the right subclavian vein with an 8-Irish   PowerPort.     SURGEON:  Simona Moise MD     ASSISTANT:  MAISHA Mars     ANESTHESIA:  Laryngeal mask.     ANESTHESIOLOGIST:  Bienvenido Gusman MD     INDICATIONS:  The patient is a 21-year-old male who was diagnosed ALL and has   already received his induction therapy.  He is being brought at this time now   for port placement. The indications for a surgical assistant in this surgery were indicated due to complexity of the procedure. Their role included aiding in incision, retraction, holding device and closure of the wound.        FINDINGS:  Port was placed in the vein.  On fluoroscopy, the tip was noted to   be in superior vena cava right atrial junction, flushed and michael easily.  It   was not left accessed.     DESCRIPTION OF PROCEDURE:  After the patient was identified and consented, he   was brought to the operating room and placed in supine position.  The patient   underwent laryngeal mask anesthetic clearance.  The patient's chest was   prepped and his fashion.  An 18-gauge thin-walled needle in the right   subclavian vein.  Wire was passed.  Insertion site was enlarged and dilated.    The port site was selected on the anterior chest wall.  Subcutaneous pocket   was formed.  The port was brought into the field and sewn in with a 2-0   Prolene.  The catheter was then passed up the insertion site and cut to fit   the patient.  Dilator and sheath were passed on the wire.  Wire and dilator   removed.  The catheter was passed down the peel-away sheath, which was also   removed.  Catheter flushed and michael easily and on fluoroscopy, the tip was   noted to be at right atrial superior vena caval junction, flushed and michael   easily.  Port site was closed with 3-0 Vicryl subcutaneous layer and skin    incisions closed with 4-0 Vicryls.  It was not left accessed.  Op-Site   dressing placed on the wound.  The patient was extubated and taken to recovery   room in stable condition.  All sponge and needle counts were correct.        ______________________________  MD NAPOLEON MILES/THALIA      DD:  08/29/2022 13:32  DT:  08/29/2022 14:38    Job#:  [id number]    CC:MD Bienvenido Victor MD(User)

## 2022-08-29 NOTE — ANESTHESIA PREPROCEDURE EVALUATION
Case: [id number] Date/Time: 08/29/22 1245    Procedure: INSERTION, CATHETER    Location: [facility] OR 08 / SURGERY [facility]    Surgeons: Simona Moise M.D.          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);BCR-ABL1 ([facility])   (positive) Chronic midline thoracic back pain   (positive) Emmet chromosome positive acute lymphoblastic leukemia ([facility])   (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    Pertinent diagnostic labs and testing reviewed    Informed Consent:    Anesthetic plan and risks discussed with patient.    Use of blood products discussed with: patient whom consented to blood products.

## 2022-08-29 NOTE — PROGRESS NOTES
Pt to [facility] for lab draw .    Awake and alert in no acute distress.  Labs drawn from the LAC without difficulty / with 1 attempt.   PICC line unable to flush or draw blood.  Pt tolerated well.  Pt to pre op for bone marrow/port placement.

## 2022-08-30 ENCOUNTER — TELEPHONE (OUTPATIENT)
Dept: INFUSION CENTER | Facility: MEDICAL CENTER | Age: 22
End: 2022-08-30
Payer: COMMERCIAL

## 2022-08-30 ASSESSMENT — PAIN SCALES - GENERAL: PAIN_LEVEL: 3

## 2022-08-30 NOTE — ANESTHESIA POSTPROCEDURE EVALUATION
Patient: Tomas Jean-Baptiste    Procedure Summary     Date: 08/29/22 Room / Location: [place] [address] / SURGERY [facility]    Anesthesia Start: 1310 Anesthesia Stop: 1340    Procedure: INSERTION, CATHETER (Right) Diagnosis: (Leukemia)    Surgeons: Simona Moise M.D. Responsible Provider: Bienvenido Gusman M.D.    Anesthesia Type: general ASA Status: 3          Final Anesthesia Type: general  Last vitals  BP   Blood Pressure: 114/55    Temp   36.2 °C (97.2 °F)    Pulse   74   Resp   16    SpO2   98 %      Anesthesia Post Evaluation    Patient location during evaluation: PACU  Patient participation: complete - patient participated  Level of consciousness: awake and alert  Pain score: 3    Airway patency: patent  Anesthetic complications: no  Cardiovascular status: hemodynamically stable  Respiratory status: acceptable  Hydration status: euvolemic    PONV: none          No notable events documented.

## 2022-08-30 NOTE — TELEPHONE ENCOUNTER
Discharge phone call to pt re: visit on 8/29/22. Parent reports pt is doing well.  No questions or concerns at this time.

## 2022-08-31 ENCOUNTER — OFFICE VISIT (OUTPATIENT)
Dept: OPHTHALMOLOGY | Facility: MEDICAL CENTER | Age: 22
End: 2022-08-31
Payer: COMMERCIAL

## 2022-08-31 DIAGNOSIS — D69.6 THROMBOCYTOPENIA (HCC): ICD-10-CM

## 2022-08-31 DIAGNOSIS — H49.22 LEFT ABDUCENS NERVE PALSY: ICD-10-CM

## 2022-08-31 DIAGNOSIS — H46.9 OPTIC NEUROPATHY: ICD-10-CM

## 2022-08-31 DIAGNOSIS — C91.00 PRE B-CELL ACUTE LYMPHOBLASTIC LEUKEMIA (ALL) (HCC): ICD-10-CM

## 2022-08-31 PROCEDURE — 92060 SENSORIMOTOR EXAMINATION: CPT | Performed by: OPHTHALMOLOGY

## 2022-08-31 PROCEDURE — 92250 FUNDUS PHOTOGRAPHY W/I&R: CPT | Performed by: OPHTHALMOLOGY

## 2022-08-31 PROCEDURE — 99213 OFFICE O/P EST LOW 20 MIN: CPT | Mod: 25 | Performed by: OPHTHALMOLOGY

## 2022-08-31 ASSESSMENT — VISUAL ACUITY
CORRECTION_TYPE: GLASSES
METHOD: SNELLEN - LINEAR
OD_SC: 20/20
OS_SC: 20/40

## 2022-08-31 ASSESSMENT — CUP TO DISC RATIO
OD_RATIO: 0.3
OS_RATIO: 0.3

## 2022-08-31 ASSESSMENT — CONF VISUAL FIELD
OS_INFERIOR_TEMPORAL_RESTRICTION: 0
OS_SUPERIOR_TEMPORAL_RESTRICTION: 0
OD_INFERIOR_TEMPORAL_RESTRICTION: 0
OD_INFERIOR_NASAL_RESTRICTION: 0
OD_SUPERIOR_TEMPORAL_RESTRICTION: 0
OS_SUPERIOR_NASAL_RESTRICTION: 0
OS_NORMAL: 1
OS_INFERIOR_NASAL_RESTRICTION: 0
OD_SUPERIOR_NASAL_RESTRICTION: 0
OD_NORMAL: 1

## 2022-08-31 ASSESSMENT — REFRACTION_WEARINGRX
OS_AXIS: 087
SPECS_TYPE: SVL
OS_SPHERE: -7.50
OD_CYLINDER: +2.75
OS_CYLINDER: +2.50
OD_SPHERE: -7.25
OD_AXIS: 094

## 2022-08-31 ASSESSMENT — EXTERNAL EXAM - RIGHT EYE: OD_EXAM: NORMAL

## 2022-08-31 ASSESSMENT — REFRACTION_MANIFEST
OD_AXIS: 095
METHOD_AUTOREFRACTION: 1
OS_CYLINDER: +3.00
OD_CYLINDER: +3.00
OD_SPHERE: -7.25
OS_SPHERE: -8.00
OS_AXIS: 084

## 2022-08-31 ASSESSMENT — REFRACTION_FINALRX
OS_HPRISM: 6
OD_HPRISM: 6
OS_HBASE: OUT
OD_HBASE: OUT

## 2022-08-31 ASSESSMENT — TONOMETRY
IOP_METHOD: I-CARE
OS_IOP_MMHG: 10
OD_IOP_MMHG: 13

## 2022-08-31 ASSESSMENT — EXTERNAL EXAM - LEFT EYE: OS_EXAM: NORMAL

## 2022-08-31 ASSESSMENT — ENCOUNTER SYMPTOMS: DOUBLE VISION: 1

## 2022-08-31 ASSESSMENT — SLIT LAMP EXAM - LIDS
COMMENTS: NORMAL
COMMENTS: NORMAL

## 2022-08-31 NOTE — ASSESSMENT & PLAN NOTE
7/15/2022 - Today has evidence of intraretinal flame and dot hemorrhages, white centered hemorrhages and some cotton wool spots. No nerve involvement and OCT NFL thickness normal at 100 OD and 98 OS. Hemorrhages not involving macula.   8/31/2022 - Resolution of intraretinal hemorrhages and cotton wool spot. OCT NFL thickness stable at 97 OD and 96 OS

## 2022-08-31 NOTE — ASSESSMENT & PLAN NOTE
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 Blurry vision secondary to asthenopia from overlapping images and acuity improves under monocular conditions. 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 Less likely infectious process given no papilledema, headaches or other meningeal signs. Could also be some form of ischemic process if BP fluctuations during induction. Recommend MRI orbits with citlali and repeat LP. Recommend that he could wear eye patch.   7/25/2022 - Has almost full abduction of the left eye, but has a relatively comitant esotropia. Uncertain if this is as the nerve heals, or being left with a comitant strabismus. In PAT did well wih a 12 base out prism OS. Will re-eval in 4 to 6 weeks and if stable will grind in or if improved will taper the prism  8/31/2022 - Doing will with the prism glasses. Showing some slight improvement. Measuring 10 diopters today. Thus will continue to monitor

## 2022-08-31 NOTE — PROGRESS NOTES
Peds/Neuro Ophthalmology:   Ephraim Carranza M.D.    Date & Time note created:    8/31/2022   4:03 PM     Referring MD / APRN:  Margraito Arvizu M.D., No att. providers found    Patient ID:  Name:             Tomas Jean-Baptiste   YOB: 2000  Age:                 21 y.o.  male   MRN:               [id number]    Chief Complaint/Reason for Visit:     Other (1 month follow up for Left abducens nerve palsy )      History of Present Illness:    JULY Jean-Baptiste is a 21 y.o. male   1 month follow up for Left abducens nerve palsy. Pt states vision has improved with prism on left lens. Pt has no pain or discomfort in both eyes. Denies headaches.      Review of Systems:  Review of Systems   Eyes:  Positive for double vision.        Left abducens nerve palsy   Myopia of both eyes   Optic neuropathy        All other systems reviewed and are negative.    Past Medical History:   Past Medical History:   Diagnosis Date    Cancer (HCC)     Leukoma        Past Surgical History:  Past Surgical History:   Procedure Laterality Date    CATH PLACEMENT Right 8/29/2022    Procedure: INSERTION, CATHETER;  Surgeon: Simona Moise M.D.;  Location: SURGERY [facility];  Service: Ent       Current Outpatient Medications:  Current Outpatient Medications   Medication Sig Dispense Refill    mercaptopurine (PURINETHOL) 50 MG Tab Take 50 mg by mouth every day. Pt started on 7/5/2022 for 28 day course  Take 2 tablets (100 mg) daily Tue - Sun and 2.5 tablets (125 mg) on Mondays only.  Continue for 28 days total.      normal saline flush 0.9 % Solution Infuse 3 mL into a venous catheter 2 times a day for 15 days. (Patient taking differently: Infuse 3 mL into a venous catheter 2 times a day. Pt started on 8/17/2022 for 15 day course) 600 mL 0    Sodium Chloride Flush (SALINE FLUSH) 0.9 % Solution Infuse 3 mL into a venous catheter every 12 hours for 30 days. (Patient taking differently: Infuse 3 mL into a venous catheter every  12 hours. Pt started on 8/3/2022 for 30 day course) 600 mL 0    imatinib (GLEEVEC) 400 MG tablet Take 400 mg by mouth every day. Take 600 mg by mouth every day (1 x 400 mg and 2 x 100 mg tablets)      imatinib (GLEEVEC) 100 MG tablet Take 200 mg by mouth every day. Take 600 mg by mouth every day (1 x 400 mg and 2 x 100 mg tablets)      vitamin D3 (CHOLECALCIFEROL) 1000 Unit (25 mcg) Tab Take 1,000 Units by mouth every day.      sulfamethoxazole-trimethoprim (BACTRIM) 400-80 MG Tab Take 2 tablets by mouth twice daily every Saturday and Sunday (Patient taking differently: Take 1 Tablet by mouth see administration instructions. Take 2 tablets by mouth twice daily every Saturday and Sunday) 40 Tablet 11    ondansetron (ZOFRAN ODT) 8 MG TABLET DISPERSIBLE Dissovle 1 Tablet by mouth every 8 hours as needed for Nausea. 20 Tablet 0    ascorbic acid (ASCORBIC ACID) 500 MG Tab Take 500 mg by mouth every day.      therapeutic multivitamin-minerals (THERAGRAN-M) Tab Take 1 Tab by mouth every day.       No current facility-administered medications for this visit.       Allergies:  Allergies   Allergen Reactions    Amoxicillin Rash     Reacted as an infant. No swelling or airway problems.       Family History:  Family History   Problem Relation Age of Onset    No Known Problems Mother     Stroke Maternal Grandmother     Diabetes Paternal Grandfather     Hypertension Paternal Grandfather     Hyperlipidemia Paternal Grandfather     Cancer Neg Hx     Heart Disease Neg Hx        Social History:  Social History     Socioeconomic History    Marital status: Single     Spouse name: Not on file    Number of children: Not on file    Years of education: Not on file    Highest education level: Not on file   Occupational History    Not on file   Tobacco Use    Smoking status: Never    Smokeless tobacco: Never   Vaping Use    Vaping Use: Never used   Substance and Sexual Activity    Alcohol use: Never    Drug use: Never    Sexual activity: Not  Currently   Other Topics Concern    Behavioral problems Not Asked    Interpersonal relationships Not Asked    Sad or not enjoying activities Not Asked    Suicidal thoughts Not Asked    Poor school performance Not Asked    Reading difficulties Not Asked    Speech difficulties Not Asked    Writing difficulties Not Asked    Inadequate sleep Not Asked    Excessive TV viewing Not Asked    Excessive video game use Not Asked    Inadequate exercise Not Asked    Sports related Not Asked    Poor diet Not Asked    Family concerns for drug/alcohol abuse Not Asked    Poor oral hygiene Not Asked    Bike safety Not Asked    Family concerns vehicle safety Not Asked   Social History Narrative    Not on file     Social Determinants of Health     Financial Resource Strain: Not on file   Food Insecurity: Not on file   Transportation Needs: Not on file   Physical Activity: Not on file   Stress: Not on file   Social Connections: Not on file   Intimate Partner Violence: Not on file   Housing Stability: Not on file          Physical Exam:  Physical Exam    Oriented x 3  Weight/BMI: There is no height or weight on file to calculate BMI.  There were no vitals taken for this visit.    Base Eye Exam       Visual Acuity (Snellen - Linear)         Right Left    Dist sc 20/20 20/40      Correction: Glasses              Tonometry (i-care, 2:33 PM)         Right Left    Pressure 13 10              Pupils         Pupils    Right PERRL    Left PERRL              Visual Fields         Right Left     Full Full              Neuro/Psych       Oriented x3: Yes    Mood/Affect: Normal              Dilation       Both eyes: able to view without dilation                   Strabismus Exam       Correction: cc      Distance Near Near +3DS N Bifocals                      0 0 0   0 0 0                      ET 12 0  0  ET 10 0  0  ET 10                     0 0 0   0 0 0                       Slit Lamp and Fundus Exam       External Exam         Right Left     External Normal Normal              Slit Lamp Exam         Right Left    Lids/Lashes Normal Normal    Conjunctiva/Sclera White and quiet White and quiet    Cornea Clear Clear    Anterior Chamber Deep and quiet Deep and quiet    Iris Round and reactive Round and reactive    Lens Clear Clear    Vitreous Normal Normal              Fundus Exam         Right Left    Disc Normal Normal    C/D Ratio 0.3 0.3    Macula Normal Normal    Vessels Normal Normal    Periphery Normal Normal                  Refraction       Wearing Rx         Sphere Cylinder Axis    Right -7.25 +2.75 094    Left -7.50 +2.50 087      Type: SVL              Manifest Refraction (Auto)         Sphere Cylinder Axis    Right -7.25 +3.00 095    Left -8.00 +3.00 084              Final Rx         Sphere Cylinder Axis Horz Prism    Right -7.25 +2.75 095 6 out    Left -7.50 +2.50 085 6 out      Type: SVL                    Pertinent Lab/Test/Imaging Review:      Assessment and Plan:     Left abducens nerve palsy  6/3/2022 - 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 Blurry vision secondary to asthenopia from overlapping images and acuity improves under monocular conditions. 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 Less likely infectious process given no papilledema, headaches or other meningeal signs. Could also be some form of ischemic process if BP fluctuations during induction. Recommend MRI orbits with citlali and repeat LP. Recommend that he could wear eye patch.   7/25/2022 - Has almost full abduction of the left eye, but has a relatively comitant esotropia. Uncertain if this is as the nerve heals, or being left with a comitant strabismus. In PAT did well wih a 12 base out prism OS. Will re-eval in 4 to 6 weeks and if stable will grind in or if improved will taper the prism  8/31/2022 - Doing will with the prism glasses. Showing some slight improvement. Measuring 10 diopters today. Thus will continue to monitor     Pre B-cell acute lymphoblastic  leukemia (ALL) ([facility])  8/31/2022 - Having bone marrow biopsy tomorrow    Thrombocytopenia (HCC)  7/15/2022 - Today has evidence of intraretinal flame and dot hemorrhages, white centered hemorrhages and some cotton wool spots. No nerve involvement and OCT NFL thickness normal at 100 OD and 98 OS. Hemorrhages not involving macula.   8/31/2022 - Resolution of intraretinal hemorrhages and cotton wool spot. OCT NFL thickness stable at 97 OD and 96 OS        Ephraim Carranza M.D.

## 2022-09-01 ENCOUNTER — HOSPITAL ENCOUNTER (OUTPATIENT)
Dept: INFUSION CENTER | Facility: MEDICAL CENTER | Age: 22
End: 2022-09-01
Attending: PEDIATRICS
Payer: COMMERCIAL

## 2022-09-01 VITALS
TEMPERATURE: 97.6 F | HEART RATE: 94 BPM | RESPIRATION RATE: 18 BRPM | BODY MASS INDEX: 25.67 KG/M2 | WEIGHT: 154.1 LBS | SYSTOLIC BLOOD PRESSURE: 121 MMHG | HEIGHT: 65 IN | OXYGEN SATURATION: 99 % | DIASTOLIC BLOOD PRESSURE: 72 MMHG

## 2022-09-01 DIAGNOSIS — C91.Z0 B LYMPHOBLASTIC LEUKEMIA WITH T(9;22)(Q34;Q11.2);BCR-ABL1 (HCC): ICD-10-CM

## 2022-09-01 DIAGNOSIS — Z51.11 ENCOUNTER FOR ANTINEOPLASTIC CHEMOTHERAPY: ICD-10-CM

## 2022-09-01 DIAGNOSIS — Z01.89 ENCOUNTER FOR LUMBAR PUNCTURE: ICD-10-CM

## 2022-09-01 DIAGNOSIS — C91.00 PHILADELPHIA CHROMOSOME POSITIVE ACUTE LYMPHOBLASTIC LEUKEMIA (HCC): ICD-10-CM

## 2022-09-01 DIAGNOSIS — C91.00 PRE B-CELL ACUTE LYMPHOBLASTIC LEUKEMIA (ALL) (HCC): Primary | ICD-10-CM

## 2022-09-01 LAB
ANISOCYTOSIS BLD QL SMEAR: ABNORMAL
BASOPHILS # BLD AUTO: 1 % (ref 0–1.8)
BASOPHILS # BLD: 0.01 K/UL (ref 0–0.12)
EOSINOPHIL # BLD AUTO: 0.08 K/UL (ref 0–0.51)
EOSINOPHIL NFR BLD: 7.8 % (ref 0–6.9)
ERYTHROCYTE [DISTWIDTH] IN BLOOD BY AUTOMATED COUNT: 88.7 FL (ref 35.9–50)
HCT VFR BLD AUTO: 27.8 % (ref 42–52)
HGB BLD-MCNC: 9 G/DL (ref 14–18)
LYMPHOCYTES # BLD AUTO: 0.38 K/UL (ref 1–4.8)
LYMPHOCYTES NFR BLD: 37.9 % (ref 22–41)
MACROCYTES BLD QL SMEAR: ABNORMAL
MANUAL DIFF BLD: NORMAL
MCH RBC QN AUTO: 31.8 PG (ref 27–33)
MCHC RBC AUTO-ENTMCNC: 32.4 G/DL (ref 33.7–35.3)
MCV RBC AUTO: 98.2 FL (ref 81.4–97.8)
MONOCYTES # BLD AUTO: 0.35 K/UL (ref 0–0.85)
MONOCYTES NFR BLD AUTO: 34.9 % (ref 0–13.4)
MORPHOLOGY BLD-IMP: NORMAL
NEUTROPHILS # BLD AUTO: 0.18 K/UL (ref 1.82–7.42)
NEUTROPHILS NFR BLD: 18.4 % (ref 44–72)
NRBC # BLD AUTO: 0 K/UL
NRBC BLD-RTO: 0 /100 WBC
OVALOCYTES BLD QL SMEAR: NORMAL
PLATELET # BLD AUTO: 348 K/UL (ref 164–446)
PLATELET BLD QL SMEAR: NORMAL
PMV BLD AUTO: 9 FL (ref 9–12.9)
POIKILOCYTOSIS BLD QL SMEAR: NORMAL
POLYCHROMASIA BLD QL SMEAR: NORMAL
RBC # BLD AUTO: 2.83 M/UL (ref 4.7–6.1)
RBC BLD AUTO: PRESENT
WBC # BLD AUTO: 1 K/UL (ref 4.8–10.8)

## 2022-09-01 PROCEDURE — A4212 NON CORING NEEDLE OR STYLET: HCPCS

## 2022-09-01 PROCEDURE — 700111 HCHG RX REV CODE 636 W/ 250 OVERRIDE (IP): Performed by: PEDIATRICS

## 2022-09-01 PROCEDURE — 36591 DRAW BLOOD OFF VENOUS DEVICE: CPT

## 2022-09-01 PROCEDURE — 85025 COMPLETE CBC W/AUTO DIFF WBC: CPT

## 2022-09-01 PROCEDURE — 85007 BL SMEAR W/DIFF WBC COUNT: CPT

## 2022-09-01 RX ORDER — HEPARIN SODIUM (PORCINE) LOCK FLUSH IV SOLN 100 UNIT/ML 100 UNIT/ML
500 SOLUTION INTRAVENOUS PRN
Status: CANCELLED | OUTPATIENT
Start: 2022-09-01

## 2022-09-01 RX ORDER — 0.9 % SODIUM CHLORIDE 0.9 %
20 VIAL (ML) INJECTION PRN
Status: CANCELLED | OUTPATIENT
Start: 2022-09-01

## 2022-09-01 RX ORDER — SODIUM CHLORIDE 9 MG/ML
1000 INJECTION, SOLUTION INTRAVENOUS
Status: DISCONTINUED | OUTPATIENT
Start: 2022-09-01 | End: 2022-09-01

## 2022-09-01 RX ORDER — HEPARIN SODIUM (PORCINE) LOCK FLUSH IV SOLN 100 UNIT/ML 100 UNIT/ML
500 SOLUTION INTRAVENOUS PRN
Status: DISCONTINUED | OUTPATIENT
Start: 2022-09-01 | End: 2022-09-02 | Stop reason: HOSPADM

## 2022-09-01 RX ORDER — HEPARIN SODIUM,PORCINE 10 UNIT/ML
30 VIAL (ML) INTRAVENOUS PRN
Status: CANCELLED | OUTPATIENT
Start: 2022-09-01

## 2022-09-01 RX ORDER — LIDOCAINE AND PRILOCAINE 25; 25 MG/G; MG/G
1 CREAM TOPICAL PRN
Status: DISCONTINUED | OUTPATIENT
Start: 2022-09-01 | End: 2022-09-01

## 2022-09-01 RX ORDER — SODIUM CHLORIDE 9 MG/ML
INJECTION, SOLUTION INTRAVENOUS CONTINUOUS
Status: DISCONTINUED | OUTPATIENT
Start: 2022-09-01 | End: 2022-09-01

## 2022-09-01 RX ADMIN — HEPARIN 500 UNITS: 100 SYRINGE at 12:13

## 2022-09-01 ASSESSMENT — FIBROSIS 4 INDEX: FIB4 SCORE: 0.16

## 2022-09-01 NOTE — PROGRESS NOTES
LOBO [facility] for CHO received call from Lab with critical result of WBC 1.0, ANC 0.18 at 1400. Critical lab result read back to lab.   Dr. Duenas already aware of lab values, notified of critical lab result at 1225.  Critical lab result read back by Dr. Duenas.

## 2022-09-01 NOTE — PROGRESS NOTES
"PT to [facility] for bone marrow aspiration and biopsy.      Afebrile.  VSS. Port accessed using a 22g 3/4\" trevino needle with 1 attempt.  PT tolerated well.      Kami from Lab called with critical result of WBC 1.0 at 1110. Critical lab result read back to Kami.   Dr. Duenas notified of critical lab result at 1110.  Critical lab result read back by Dr. Duenas.    Pts ANC is 180. Pt unable to complete bone marrow procedure. Plan to reschedule for 9/7/22.     Port flushed per orders (see MAR) and de-accessed.  Pt instructed that results will be made available to the ordering provider and to contact that provider for follow-up.  Next appt on 9/7/22 for repeat attempt for bone marrow.   "

## 2022-09-01 NOTE — PROGRESS NOTES
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                                                                                                                                               Performance Status: Karnofsky 90, ECOG  1 (very minimal restriction)      Current Therapeutic Medications:  (verified 100% compliance)     Imatinib 600 mg PO daily (start 6/3/2022)      Evaluations / Study Labs (obtained 9/1/2022):     WBC 1000/microliters, Hgb 9 gm/dL, platelets 348,000/microliters  /microliters, /microliters     Therapy Given/Evaluation obtained (9/1/2022):     None           Disposition: Return to [facility] on 9/7/2022 for repeat CBC and potential bone marrow procedure if meets ANC criteria.    Alyce Duenas MD  Pediatric Hematology/Oncology  Cell: [phone]  [facility]. [phone]

## 2022-09-02 ENCOUNTER — TELEPHONE (OUTPATIENT)
Dept: INFUSION CENTER | Facility: MEDICAL CENTER | Age: 22
End: 2022-09-02
Payer: COMMERCIAL

## 2022-09-02 NOTE — ADDENDUM NOTE
Encounter addended by: Huong Foster R.N. on: 9/2/2022 7:40 AM   Actions taken: Charge Capture section accepted

## 2022-09-02 NOTE — TELEPHONE ENCOUNTER
LMR for follow up call re:JULY  Advised to call back at [phone] with any comments, questions or concerns.

## 2022-09-07 ENCOUNTER — HOSPITAL ENCOUNTER (OUTPATIENT)
Dept: INFUSION CENTER | Facility: MEDICAL CENTER | Age: 22
End: 2022-09-07
Attending: PEDIATRICS
Payer: COMMERCIAL

## 2022-09-07 VITALS
HEART RATE: 74 BPM | HEIGHT: 66 IN | BODY MASS INDEX: 24.94 KG/M2 | SYSTOLIC BLOOD PRESSURE: 105 MMHG | WEIGHT: 155.2 LBS | DIASTOLIC BLOOD PRESSURE: 81 MMHG | RESPIRATION RATE: 18 BRPM | OXYGEN SATURATION: 100 % | TEMPERATURE: 97.6 F

## 2022-09-07 DIAGNOSIS — C91.00 PHILADELPHIA CHROMOSOME POSITIVE ACUTE LYMPHOBLASTIC LEUKEMIA (HCC): ICD-10-CM

## 2022-09-07 DIAGNOSIS — C91.Z0 B LYMPHOBLASTIC LEUKEMIA WITH T(9;22)(Q34;Q11.2);BCR-ABL1 (HCC): ICD-10-CM

## 2022-09-07 DIAGNOSIS — Z01.89 ENCOUNTER FOR LUMBAR PUNCTURE: Primary | ICD-10-CM

## 2022-09-07 DIAGNOSIS — Z51.11 ENCOUNTER FOR ANTINEOPLASTIC CHEMOTHERAPY: ICD-10-CM

## 2022-09-07 LAB
ANISOCYTOSIS BLD QL SMEAR: ABNORMAL
BASOPHILS # BLD AUTO: 1.6 % (ref 0–1.8)
BASOPHILS # BLD: 0.05 K/UL (ref 0–0.12)
COMMENT 1642: NORMAL
EOSINOPHIL # BLD AUTO: 0.08 K/UL (ref 0–0.51)
EOSINOPHIL NFR BLD: 2.5 % (ref 0–6.9)
ERYTHROCYTE [DISTWIDTH] IN BLOOD BY AUTOMATED COUNT: 86.7 FL (ref 35.9–50)
HCT VFR BLD AUTO: 30.7 % (ref 42–52)
HGB BLD-MCNC: 9.8 G/DL (ref 14–18)
IMM GRANULOCYTES # BLD AUTO: 0.02 K/UL (ref 0–0.11)
IMM GRANULOCYTES NFR BLD AUTO: 0.6 % (ref 0–0.9)
LYMPHOCYTES # BLD AUTO: 0.74 K/UL (ref 1–4.8)
LYMPHOCYTES NFR BLD: 23.6 % (ref 22–41)
MACROCYTES BLD QL SMEAR: ABNORMAL
MCH RBC QN AUTO: 31.8 PG (ref 27–33)
MCHC RBC AUTO-ENTMCNC: 31.9 G/DL (ref 33.7–35.3)
MCV RBC AUTO: 99.7 FL (ref 81.4–97.8)
MICROCYTES BLD QL SMEAR: ABNORMAL
MONOCYTES # BLD AUTO: 0.54 K/UL (ref 0–0.85)
MONOCYTES NFR BLD AUTO: 17.2 % (ref 0–13.4)
MORPHOLOGY BLD-IMP: NORMAL
NEUTROPHILS # BLD AUTO: 1.71 K/UL (ref 1.82–7.42)
NEUTROPHILS NFR BLD: 54.5 % (ref 44–72)
NRBC # BLD AUTO: 0 K/UL
NRBC BLD-RTO: 0 /100 WBC
OVALOCYTES BLD QL SMEAR: NORMAL
PATHOLOGY CONSULT NOTE: NORMAL
PLATELET # BLD AUTO: 241 K/UL (ref 164–446)
PLATELET BLD QL SMEAR: NORMAL
PMV BLD AUTO: 8.6 FL (ref 9–12.9)
POIKILOCYTOSIS BLD QL SMEAR: NORMAL
POLYCHROMASIA BLD QL SMEAR: NORMAL
RBC # BLD AUTO: 3.08 M/UL (ref 4.7–6.1)
RBC BLD AUTO: PRESENT
WBC # BLD AUTO: 3.1 K/UL (ref 4.8–10.8)

## 2022-09-07 PROCEDURE — 88305 TISSUE EXAM BY PATHOLOGIST: CPT

## 2022-09-07 PROCEDURE — 88185 FLOWCYTOMETRY/TC ADD-ON: CPT | Mod: 91

## 2022-09-07 PROCEDURE — 38220 DX BONE MARROW ASPIRATIONS: CPT | Performed by: PEDIATRICS

## 2022-09-07 PROCEDURE — 700111 HCHG RX REV CODE 636 W/ 250 OVERRIDE (IP): Performed by: PEDIATRICS

## 2022-09-07 PROCEDURE — 700101 HCHG RX REV CODE 250: Performed by: PEDIATRICS

## 2022-09-07 PROCEDURE — A4212 NON CORING NEEDLE OR STYLET: HCPCS

## 2022-09-07 PROCEDURE — 85025 COMPLETE CBC W/AUTO DIFF WBC: CPT

## 2022-09-07 PROCEDURE — 99214 OFFICE O/P EST MOD 30 MIN: CPT | Performed by: PEDIATRICS

## 2022-09-07 PROCEDURE — 88313 SPECIAL STAINS GROUP 2: CPT

## 2022-09-07 PROCEDURE — 503422 HCHG CHILDRENS ANESTHESIA

## 2022-09-07 PROCEDURE — 36591 DRAW BLOOD OFF VENOUS DEVICE: CPT

## 2022-09-07 PROCEDURE — 38220 DX BONE MARROW ASPIRATIONS: CPT

## 2022-09-07 PROCEDURE — 88184 FLOWCYTOMETRY/ TC 1 MARKER: CPT

## 2022-09-07 RX ORDER — HEPARIN SODIUM (PORCINE) LOCK FLUSH IV SOLN 100 UNIT/ML 100 UNIT/ML
500 SOLUTION INTRAVENOUS PRN
Status: CANCELLED | OUTPATIENT
Start: 2022-09-07

## 2022-09-07 RX ORDER — LIDOCAINE AND PRILOCAINE 25; 25 MG/G; MG/G
CREAM TOPICAL ONCE
Status: COMPLETED | OUTPATIENT
Start: 2022-09-07 | End: 2022-09-07

## 2022-09-07 RX ORDER — 0.9 % SODIUM CHLORIDE 0.9 %
20 VIAL (ML) INJECTION PRN
Status: CANCELLED | OUTPATIENT
Start: 2022-09-07

## 2022-09-07 RX ORDER — MIDAZOLAM HYDROCHLORIDE 1 MG/ML
2 INJECTION INTRAMUSCULAR; INTRAVENOUS
Status: DISCONTINUED | OUTPATIENT
Start: 2022-09-07 | End: 2022-09-07

## 2022-09-07 RX ORDER — LIDOCAINE 40 MG/G
1 CREAM TOPICAL ONCE
Status: DISCONTINUED | OUTPATIENT
Start: 2022-09-07 | End: 2022-09-07

## 2022-09-07 RX ORDER — KETAMINE HYDROCHLORIDE 50 MG/ML
50 INJECTION, SOLUTION INTRAMUSCULAR; INTRAVENOUS
Status: DISCONTINUED | OUTPATIENT
Start: 2022-09-07 | End: 2022-09-07

## 2022-09-07 RX ORDER — HEPARIN SODIUM (PORCINE) LOCK FLUSH IV SOLN 100 UNIT/ML 100 UNIT/ML
500 SOLUTION INTRAVENOUS PRN
Status: DISCONTINUED | OUTPATIENT
Start: 2022-09-07 | End: 2022-09-08 | Stop reason: HOSPADM

## 2022-09-07 RX ORDER — HEPARIN SODIUM,PORCINE 10 UNIT/ML
30 VIAL (ML) INTRAVENOUS PRN
Status: CANCELLED | OUTPATIENT
Start: 2022-09-07

## 2022-09-07 RX ADMIN — LIDOCAINE AND PRILOCAINE 1 APPLICATION: 25; 25 CREAM TOPICAL at 10:42

## 2022-09-07 RX ADMIN — FENTANYL CITRATE 50 MCG: 50 INJECTION, SOLUTION INTRAMUSCULAR; INTRAVENOUS at 11:39

## 2022-09-07 RX ADMIN — HEPARIN 500 UNITS: 100 SYRINGE at 12:34

## 2022-09-07 RX ADMIN — PROPOFOL 100 MG: 10 INJECTION, EMULSION INTRAVENOUS at 11:43

## 2022-09-07 ASSESSMENT — FIBROSIS 4 INDEX: FIB4 SCORE: 0.19

## 2022-09-07 NOTE — PROGRESS NOTES
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 No swelling or airway problems.     Details of Present Illness:  Tomas Roy  is a 21 y.o.  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 He last had sedation on 8/29 with anesthesiology, and prior to that 6/27 with pediatric intensivist. He has done well with his prior sedation, although experienced some emergence psychosis with ketamine (given 250 mg) during his initial hospitalization in May 2022 when he had a long sedation for LP, bone marrow biopsy, and PICC placement.     He presents today in good health. Enrolled on [facility] [id number] began study 6/13/2022. No current new c/o and indicates feeling \"terrific\" given his recent break from chemotherapy. He attended in-person class yesterday at the [facility].     Reviewed past and family history, no contraindications for proceding with sedation. Patient has had no URI sx, no vomiting or diarrhea, no change in appetite.  No h/o complications with sedation, no h/o snoring or apnea. I discussed the option of trialing ketamine again with a much lower dose, but he prefers to not receive ketamine again. We will proceed with fentanyl and propofol as per last sedation.     Past Medical History:   Diagnosis Date    Cancer (HCC)     Leukoma        Social History     Socioeconomic History    Marital status: Single     Spouse name: Not on file    Number of children: Not on file    Years of education: Not on file    Highest education level: Not on file   Occupational History    Not on file   Tobacco Use    Smoking status: Never    Smokeless tobacco: Never   Vaping Use    " Vaping Use: Never used   Substance and Sexual Activity    Alcohol use: Never    Drug use: Never    Sexual activity: Not Currently   Other Topics Concern    Behavioral problems Not Asked    Interpersonal relationships Not Asked    Sad or not enjoying activities Not Asked    Suicidal thoughts Not Asked    Poor school performance Not Asked    Reading difficulties Not Asked    Speech difficulties Not Asked    Writing difficulties Not Asked    Inadequate sleep Not Asked    Excessive TV viewing Not Asked    Excessive video game use Not Asked    Inadequate exercise Not Asked    Sports related Not Asked    Poor diet Not Asked    Family concerns for drug/alcohol abuse Not Asked    Poor oral hygiene Not Asked    Bike safety Not Asked    Family concerns vehicle safety Not Asked   Social History Narrative    Not on file     Social Determinants of Health     Financial Resource Strain: Not on file   Food Insecurity: Not on file   Transportation Needs: Not on file   Physical Activity: Not on file   Stress: Not on file   Social Connections: Not on file   Intimate Partner Violence: Not on file   Housing Stability: Not on file     Pediatric History   Patient Parents    Zeinab Lees (Mother)     Other Topics Concern    Behavioral problems Not Asked    Interpersonal relationships Not Asked    Sad or not enjoying activities Not Asked    Suicidal thoughts Not Asked    Poor school performance Not Asked    Reading difficulties Not Asked    Speech difficulties Not Asked    Writing difficulties Not Asked    Inadequate sleep Not Asked    Excessive TV viewing Not Asked    Excessive video game use Not Asked    Inadequate exercise Not Asked    Sports related Not Asked    Poor diet Not Asked    Family concerns for drug/alcohol abuse Not Asked    Poor oral hygiene Not Asked    Bike safety Not Asked    Family concerns vehicle safety Not Asked   Social History Narrative    Not on file       Family History   Problem Relation Age of Onset    No  "Known Problems Mother     Stroke Maternal Grandmother     Diabetes Paternal Grandfather     Hypertension Paternal Grandfather     Hyperlipidemia Paternal Grandfather     Cancer Neg Hx     Heart Disease Neg Hx      Review of Body Systems: Pertinent issues noted in HPI, full review of 10 systems reveals no other significant concerns.    NPO status:   Greater than 8 hours since taking solids and greater than 6 hours of clears or formula or Breast milk    Physical Exam:  Blood pressure (!) 95/57, pulse 82, temperature 36.4 °C (97.5 °F), temperature source Temporal, resp. rate 18, height 1.67 m (5' 5.75\"), weight 70.4 kg (155 lb 3.3 oz), SpO2 96 %.    General appearance: nontoxic, alert, well nourished, good spirits  HEENT: patchy hair loss but NC/AT, PERRL, EOMI, nares clear, MMM, neck supple  Lungs: CTAB, good AE without wheeze or rales  Heart: RRR, no murmur or gallop, full and equal pulses  Abd: soft, NT/ND, NABS  Ext: warm, well perfused, SUAREZ  Neuro: intact exam, no gross motor or sensory deficits  Skin: no rash, petechiae or purpura    Current Outpatient Medications on File Prior to Visit   Medication Sig Dispense Refill    mercaptopurine (PURINETHOL) 50 MG Tab Take 50 mg by mouth every day. Pt started on 7/5/2022 for 28 day course  Take 2 tablets (100 mg) daily Tue - Sun and 2.5 tablets (125 mg) on Mondays only.  Continue for 28 days total.      Sodium Chloride Flush (SALINE FLUSH) 0.9 % Solution Infuse 3 mL into a venous catheter every 12 hours for 30 days. (Patient not taking: Reported on 9/1/2022) 600 mL 0    imatinib (GLEEVEC) 400 MG tablet Take 400 mg by mouth every day. Take 600 mg by mouth every day (1 x 400 mg and 2 x 100 mg tablets)      imatinib (GLEEVEC) 100 MG tablet Take 200 mg by mouth every day. Take 600 mg by mouth every day (1 x 400 mg and 2 x 100 mg tablets)      vitamin D3 (CHOLECALCIFEROL) 1000 Unit (25 mcg) Tab Take 1,000 Units by mouth every day.      sulfamethoxazole-trimethoprim (BACTRIM) " 400-80 MG Tab Take 2 tablets by mouth twice daily every Saturday and Sunday (Patient taking differently: Take 1 Tablet by mouth see administration instructions. Take 2 tablets by mouth twice daily every Saturday and Sunday) 40 Tablet 11    ondansetron (ZOFRAN ODT) 8 MG TABLET DISPERSIBLE Dissovle 1 Tablet by mouth every 8 hours as needed for Nausea. 20 Tablet 0    ascorbic acid (ASCORBIC ACID) 500 MG Tab Take 500 mg by mouth every day.      therapeutic multivitamin-minerals (THERAGRAN-M) Tab Take 1 Tab by mouth every day.       No current facility-administered medications on file prior to visit.     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);BCR-ABL1 ([facility]) 06/09/2022    Encounter for antineoplastic chemotherapy 06/09/2022    Left abducens nerve palsy 06/03/2022    Myopia of both eyes 06/03/2022    Acquired pancytopenia ([facility]) 06/03/2022    Epistaxis, recurrent 06/02/2022    Pre B-cell acute lymphoblastic leukemia (ALL) ([facility]) 05/29/2022    Acute lymphoblastic leukemia in adult ([facility]) 05/27/2022    PE (pulmonary thromboembolism) ([facility]) 05/27/2022    Superficial vein thrombosis 05/27/2022    Family history of diabetes mellitus 08/18/2020    Callus of foot 08/18/2020    Flat feet 04/03/2020    Bilateral anterior knee pain 04/03/2020    Bilateral ankle joint pain 04/03/2020    Chronic midline thoracic back pain 04/03/2020     Plan:  Deep monitored sedation for LP and bone marrow biopsy    ASA Classification: III    Planned Sedation/Anesthesia Agent:  Propofol    Airway Assessment:  an adequate airway, no risk factors, no craniofacial anomalies, no h/o difficult intubation    Mallampati score: 2    Pre-sedation assessment:    I have reassessed the  patient just prior to the procedure and the patient remains an appropriate candidate to undergo the planned procedure and sedation:  Yes    Informed consent was discussed with JULY the risks, benefits, potential complications of the planned sedation.  Their questions have been answered and they have given informed consent:  Yes    Pre-sedation Assessment Time: spent for exam, and obtaining consent was: 15 minutes    Time out:  Done with family, patient and sedation RN    Post-sedation note:    Fentanyl pre-treatment: 50 mcg  Total Propofol dose: 100 mg in 20 mg aliquots    Post-sedation assessment:  JULY is stable postoperatively and has adequately recovered from anesthesia as described below unless otherwise noted. He is determined to have stable airway patency and respiratory function including respiratory rate and oxygen saturation. Patient has a stable heart rate, blood pressure, and adequate hydration.     JULY's mental status is acceptable and temperature is appropriate. Pain and nausea are adequately controlled. Refer to nursing notes for full documentation of vital signs. RN at bedside to continue monitoring.    Temp: 97.5 ºF  Pain score: 0/10  BP: adequate for age, see flow sheet    Sedation Time Out/Start time: 1138  Sedation end time: 1154    Hasmukh Burns M.D. 9/7/2022 12:16 PM

## 2022-09-07 NOTE — ADDENDUM NOTE
Encounter addended by: Cherrie Urbano R.N. on: 9/7/2022 1:26 PM   Actions taken: Child order released for a procedure order, Multistep and multistep collection tasks completed

## 2022-09-07 NOTE — H&P
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 NEGATIVE, CNS3c, testicular negative, HSV1 IgG POSITIVE, CMV IgG NEGATIVE, VARICELLA IgG POSITIVE     Active Studies: [id number], [id number]                                                                                                      Inactive Studies: [id number]                                                                                                                                                Optional Studies: None             Protocol: International Phase 3 trial in [place] 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      Treatment Plan:   [id number](OS), Induction IB, Day 42 (delayed total of 9 days for myelosuppression)     Height: 1.675 m      Weight: 64 kg       BSA: 1.73 m²   (Start of Induction 1B 7/5/2022)                                                                                                                                            Performance Status: Karnofsky 100, ECOG  0      Current Therapeutic Medications:  (verified 100% compliance)     Imatinib 600 mg PO daily (started 6/3/2022)      Evaluations / Study Labs (obtained 9/7/2022):    WBC 3.1, Hgb 9.8, platelets 241  ANC 1710,      Therapy Given (9/7/2022):     None        Disposition: Awaiting randomization.  Will return to clinic for either Standard Risk Arm A or Arm B.  Tentatively plan for early next week, sooner if results of randomization available.    Pepe Faye MD  Pediatric Hematology / Oncology  [facility]  Cell.  [phone]  Office. [phone]

## 2022-09-07 NOTE — PROCEDURES
Pediatric Oncology Bone Marrow Aspirate  Procedure Note      Patient Name:  Tomas Jean-Baptiste  : 2000  MRN: [id number]    Date of Service: 2022  Time: 11:38 AM    Procedure Performed By: Pepe Faye MD    Location of Procedure: [facility] - Procedure Room     Pre-procedural Diagnosis:  Ph+ Acute B-Lymphoblastic Leukemia in remission    Post-procedural Diagnosis: Ph+ Acute B-Lymphoblastic Leukemia in remission    Procedure:  Bone Marrow Aspiration    Sedation:  Propofol per PICU (Dr. Conde), subcutaneous and periosteal lidocaine injection for local pain control    Needle Size:  15 gauge I-type bone marrow aspiration needle    Site: Right Superior Posterior Iliac Crest    Complications: None    Bleeding: None    Procedure Note:    Tomas Jean-Baptiste is a 21 y.o. male who presents to the [facility] for scheduled bone marrow evaluation.  He is currently being treated on [facility] study [id number] and is at the end of his Induction IB therapy.  As such, presents for prerandomization evaluations.  Prior to the procedure, the risks and benefits were discussed with Tomas.  Consent for the procedure was signed by Tomas  at bedside and placed in his chart.  All pertinent labs and history were reviewed and a complete physical examination performed prior to the procedure.  All necessary safety equipment per ASA guidelines were available.  A time-out was performed and the patient identified by name,  and medical record number.  Gloves and mask were worn during the entire procedure.  The patient was prepped and draped in the usual sterile fashion and the site was cleansed with povoiodine (Betadine).    The patient was placed in prone position with a roll placed under the hips.   All bony landmarks were palpated including both iliac crests and vertebral bodies.  The subcutaneous tissue and periosteum of the right superior posterior iliac crest were infilrated with  lidocaine.  An initial insertion was made with a 15 gauge I-type aspirate needle and resulted freely flowing marrow for slides as well as adequate specimen for send out flow cytometry (MRD analysis at [facility]), PCR MRD at [facility] as well as local morphology. The patient tolerated the procedure without complications and only minimal bleeding.      Results:    PENDING    Pepe Faye MD  Pediatric Hematology / Oncology  [facility]  Cell.  [phone]

## 2022-09-07 NOTE — PROGRESS NOTES
"PT to [facility] for Bone Marrow Aspiration with sedation.      Afebrile.  VSS. Port accessed using a 22g 3/4\" trevino needle with 1 attempt by Huong MARIANO. Labs drawn and sent.  PT tolerated well.      Office visit with Dr. Faye completed.     Consult with Dr. Burns completed.     Consents signed. Okay for sedation.     Verified patency prior to procedure.   Sedation performed by Dr. Burns, procedure performed by Dr. Faye.      Start Time: 1138    Continuous monitoring with VS documented q5min.  Bone marrow completed at 1152.   See MAR for medication administration.  No unexpected events.  PT woke from sedation without complications.      Stop time: 1154    PT tolerated regular diet and ambulated independently. Port flushed per orders (see MAR) and de-accessed.  Discharge paperwork completed. Discharged home with self once discharge criteria met. Mother to drive patient home.     "

## 2022-09-08 ENCOUNTER — TELEPHONE (OUTPATIENT)
Dept: INFUSION CENTER | Facility: MEDICAL CENTER | Age: 22
End: 2022-09-08
Payer: COMMERCIAL

## 2022-09-08 NOTE — TELEPHONE ENCOUNTER
Discharge phone call to pt . Patient reports he is doing well.  No questions or concerns at this time.    
No

## 2022-09-09 LAB
ACUTE LEUKEMIA MARKERS SPEC-IMP: NORMAL
EVENTS COUNTED SPEC: 13 MARKERS

## 2022-09-21 ENCOUNTER — PATIENT OUTREACH (OUTPATIENT)
Dept: PEDIATRIC HEMATOLOGY/ONCOLOGY | Facility: OUTPATIENT CENTER | Age: 22
End: 2022-09-21
Payer: COMMERCIAL

## 2022-09-21 NOTE — PROGRESS NOTES
From: Jason Ge   Sent: Wednesday, September 21, 2022 10:42 AM  To: 'Grace Gudino' <[email or web address]>  Cc: Jason Burt <[email or web address]>; Cele Lovell <[email or web address]>  Subject: RE: Estiven Arriola,    Apologies for the delay in getting back to you. Yes, it looks like neither office has JULY's Medicaid listed as a secondary. Have you called the other office to have them utilize his Medicaid as well for new total?     If not, be sure to do so that way new total can be provided.     I am looping in [facility], as they can assist with medical bill coverage.    I will be out on maternity leave starting 9/26 through December. Please continue to reach out to Jason or Cele at [facility] for support. Additionally, you can reach out to Katelynn He, Community Health Worker at [phone] for resource assistance if anything else comes up.          Jason Ge, SRIDHAR   - [facility]  [address] [place] [address]  [address]  P: [phone]  F: [phone]  [email or web address]  Here to FIGHT THE GOOD FIGHT                                                      From: Grace Gudino <[email or web address]>   Sent: Monday, August 22, 2022 10:04 PM  To: Jason Ge <[email or web address]>  Subject: Estiven Fisher here's the copy of the bills.  I actually called one of them but I have to follow to make sure everything is taking care of.  Thank you ??

## 2022-09-26 DIAGNOSIS — C91.00 PHILADELPHIA CHROMOSOME POSITIVE ACUTE LYMPHOBLASTIC LEUKEMIA (ALL) (HCC): ICD-10-CM

## 2022-09-28 ASSESSMENT — FIBROSIS 4 INDEX: FIB4 SCORE: 0.28

## 2022-09-29 ENCOUNTER — HOSPITAL ENCOUNTER (INPATIENT)
Facility: MEDICAL CENTER | Age: 22
LOS: 3 days | DRG: 837 | End: 2022-10-02
Attending: PEDIATRICS | Admitting: PEDIATRICS
Payer: COMMERCIAL

## 2022-09-29 DIAGNOSIS — C91.00 PHILADELPHIA CHROMOSOME POSITIVE ACUTE LYMPHOBLASTIC LEUKEMIA (ALL) (HCC): ICD-10-CM

## 2022-09-29 DIAGNOSIS — Z51.11 ENCOUNTER FOR ANTINEOPLASTIC CHEMOTHERAPY: ICD-10-CM

## 2022-09-29 DIAGNOSIS — Z01.89 ENCOUNTER FOR LUMBAR PUNCTURE: ICD-10-CM

## 2022-09-29 LAB
ALBUMIN SERPL BCP-MCNC: 4.2 G/DL (ref 3.2–4.9)
ALBUMIN/GLOB SERPL: 2.3 G/DL
ALP SERPL-CCNC: 64 U/L (ref 30–99)
ALT SERPL-CCNC: 22 U/L (ref 2–50)
ANION GAP SERPL CALC-SCNC: 11 MMOL/L (ref 7–16)
ANISOCYTOSIS BLD QL SMEAR: ABNORMAL
APPEARANCE UR: CLEAR
APPEARANCE UR: CLEAR
AST SERPL-CCNC: 22 U/L (ref 12–45)
BASOPHILS # BLD AUTO: 0.8 % (ref 0–1.8)
BASOPHILS # BLD: 0.04 K/UL (ref 0–0.12)
BILIRUB CONJ SERPL-MCNC: <0.2 MG/DL (ref 0.1–0.5)
BILIRUB INDIRECT SERPL-MCNC: NORMAL MG/DL (ref 0–1)
BILIRUB SERPL-MCNC: 0.3 MG/DL (ref 0.1–1.5)
BILIRUB UR QL STRIP.AUTO: NEGATIVE
BILIRUB UR QL STRIP.AUTO: NEGATIVE
BUN SERPL-MCNC: 7 MG/DL (ref 8–22)
BURR CELLS/RBC NFR CSF MANUAL: 0 %
CALCIUM SERPL-MCNC: 8.5 MG/DL (ref 8.5–10.5)
CHLORIDE SERPL-SCNC: 106 MMOL/L (ref 96–112)
CLARITY CSF: CLEAR
CO2 SERPL-SCNC: 24 MMOL/L (ref 20–33)
COLOR CSF: COLORLESS
COLOR SPUN CSF: COLORLESS
COLOR UR: YELLOW
COLOR UR: YELLOW
CREAT SERPL-MCNC: 0.6 MG/DL (ref 0.5–1.4)
EOSINOPHIL # BLD AUTO: 0.52 K/UL (ref 0–0.51)
EOSINOPHIL NFR BLD: 11.9 % (ref 0–6.9)
ERYTHROCYTE [DISTWIDTH] IN BLOOD BY AUTOMATED COUNT: 71.2 FL (ref 35.9–50)
GFR SERPLBLD CREATININE-BSD FMLA CKD-EPI: 141 ML/MIN/1.73 M 2
GLOBULIN SER CALC-MCNC: 1.8 G/DL (ref 1.9–3.5)
GLUCOSE SERPL-MCNC: 102 MG/DL (ref 65–99)
GLUCOSE UR STRIP.AUTO-MCNC: NEGATIVE MG/DL
GLUCOSE UR STRIP.AUTO-MCNC: NEGATIVE MG/DL
HCT VFR BLD AUTO: 32.7 % (ref 42–52)
HGB BLD-MCNC: 10.9 G/DL (ref 14–18)
KETONES UR STRIP.AUTO-MCNC: NEGATIVE MG/DL
KETONES UR STRIP.AUTO-MCNC: NEGATIVE MG/DL
LEUKOCYTE ESTERASE UR QL STRIP.AUTO: NEGATIVE
LEUKOCYTE ESTERASE UR QL STRIP.AUTO: NEGATIVE
LYMPHOCYTES # BLD AUTO: 0.71 K/UL (ref 1–4.8)
LYMPHOCYTES NFR BLD: 16.1 % (ref 22–41)
LYMPHOCYTES NFR CSF: 19 %
MACROCYTES BLD QL SMEAR: ABNORMAL
MANUAL DIFF BLD: NORMAL
MCH RBC QN AUTO: 33.7 PG (ref 27–33)
MCHC RBC AUTO-ENTMCNC: 33.3 G/DL (ref 33.7–35.3)
MCV RBC AUTO: 101.2 FL (ref 81.4–97.8)
MICRO URNS: NORMAL
MICRO URNS: NORMAL
MONOCYTES # BLD AUTO: 0.33 K/UL (ref 0–0.85)
MONOCYTES NFR BLD AUTO: 7.6 % (ref 0–13.4)
MONONUC CELLS NFR CSF: 1 %
MORPHOLOGY BLD-IMP: NORMAL
MTX SERPL-SCNC: 61 UMOL/L
NEUTROPHILS # BLD AUTO: 2.8 K/UL (ref 1.82–7.42)
NEUTROPHILS NFR BLD: 63.6 % (ref 44–72)
NITRITE UR QL STRIP.AUTO: NEGATIVE
NITRITE UR QL STRIP.AUTO: NEGATIVE
NRBC # BLD AUTO: 0 K/UL
NRBC BLD-RTO: 0 /100 WBC
PH UR STRIP.AUTO: 7 [PH] (ref 5–8)
PH UR STRIP.AUTO: 7.5 [PH] (ref 5–8)
PLATELET # BLD AUTO: 140 K/UL (ref 164–446)
PLATELET BLD QL SMEAR: NORMAL
PMV BLD AUTO: 9.1 FL (ref 9–12.9)
POTASSIUM SERPL-SCNC: 3.6 MMOL/L (ref 3.6–5.5)
PROT SERPL-MCNC: 6 G/DL (ref 6–8.2)
PROT UR QL STRIP: NEGATIVE MG/DL
PROT UR QL STRIP: NEGATIVE MG/DL
RBC # BLD AUTO: 3.23 M/UL (ref 4.7–6.1)
RBC # CSF: <1 CELLS/UL
RBC BLD AUTO: PRESENT
RBC UR QL AUTO: NEGATIVE
RBC UR QL AUTO: NEGATIVE
SODIUM SERPL-SCNC: 141 MMOL/L (ref 135–145)
SP GR UR STRIP.AUTO: 1.01
SP GR UR STRIP.AUTO: 1.01
SPECIMEN VOL CSF: 2 ML
TUBE # CSF: 2
TUBE # CSF: 2
UROBILINOGEN UR STRIP.AUTO-MCNC: 0.2 MG/DL
UROBILINOGEN UR STRIP.AUTO-MCNC: 0.2 MG/DL
WBC # BLD AUTO: 4.4 K/UL (ref 4.8–10.8)
WBC # CSF: 1 CELLS/UL (ref 0–10)

## 2022-09-29 PROCEDURE — 3E0R305 INTRODUCTION OF OTHER ANTINEOPLASTIC INTO SPINAL CANAL, PERCUTANEOUS APPROACH: ICD-10-PCS | Performed by: PEDIATRICS

## 2022-09-29 PROCEDURE — 88108 CYTOPATH CONCENTRATE TECH: CPT

## 2022-09-29 PROCEDURE — 85007 BL SMEAR W/DIFF WBC COUNT: CPT

## 2022-09-29 PROCEDURE — 62272 THER SPI PNXR DRG CSF: CPT

## 2022-09-29 PROCEDURE — 700105 HCHG RX REV CODE 258: Performed by: PEDIATRICS

## 2022-09-29 PROCEDURE — 82248 BILIRUBIN DIRECT: CPT

## 2022-09-29 PROCEDURE — 89051 BODY FLUID CELL COUNT: CPT

## 2022-09-29 PROCEDURE — 700111 HCHG RX REV CODE 636 W/ 250 OVERRIDE (IP): Performed by: PEDIATRICS

## 2022-09-29 PROCEDURE — 700101 HCHG RX REV CODE 250: Performed by: PEDIATRICS

## 2022-09-29 PROCEDURE — 80299 QUANTITATIVE ASSAY DRUG: CPT

## 2022-09-29 PROCEDURE — 96450 CHEMOTHERAPY INTO CNS: CPT | Performed by: PEDIATRICS

## 2022-09-29 PROCEDURE — 80053 COMPREHEN METABOLIC PANEL: CPT

## 2022-09-29 PROCEDURE — 770000 HCHG ROOM/CARE - INTERMEDIATE ICU *

## 2022-09-29 PROCEDURE — A9270 NON-COVERED ITEM OR SERVICE: HCPCS | Performed by: PEDIATRICS

## 2022-09-29 PROCEDURE — 85025 COMPLETE CBC W/AUTO DIFF WBC: CPT

## 2022-09-29 PROCEDURE — 700102 HCHG RX REV CODE 250 W/ 637 OVERRIDE(OP): Performed by: PEDIATRICS

## 2022-09-29 PROCEDURE — 81003 URINALYSIS AUTO W/O SCOPE: CPT

## 2022-09-29 PROCEDURE — 99222 1ST HOSP IP/OBS MODERATE 55: CPT | Mod: 25 | Performed by: PEDIATRICS

## 2022-09-29 RX ORDER — PROMETHAZINE HYDROCHLORIDE 6.25 MG/5ML
0.25 SYRUP ORAL EVERY 6 HOURS PRN
Status: DISCONTINUED | OUTPATIENT
Start: 2022-09-29 | End: 2022-10-02 | Stop reason: HOSPADM

## 2022-09-29 RX ORDER — MERCAPTOPURINE 50 MG/1
50 TABLET ORAL DAILY
Status: DISCONTINUED | OUTPATIENT
Start: 2022-09-29 | End: 2022-10-02 | Stop reason: HOSPADM

## 2022-09-29 RX ORDER — MIDAZOLAM HYDROCHLORIDE 1 MG/ML
INJECTION INTRAMUSCULAR; INTRAVENOUS
Status: ACTIVE
Start: 2022-09-29 | End: 2022-09-30

## 2022-09-29 RX ORDER — SODIUM CHLORIDE 9 MG/ML
500 INJECTION, SOLUTION INTRAVENOUS CONTINUOUS
Status: DISCONTINUED | OUTPATIENT
Start: 2022-09-29 | End: 2022-10-02 | Stop reason: HOSPADM

## 2022-09-29 RX ORDER — LORAZEPAM 2 MG/ML
1 CONCENTRATE ORAL EVERY 6 HOURS PRN
Status: DISCONTINUED | OUTPATIENT
Start: 2022-09-29 | End: 2022-09-29 | Stop reason: CLARIF

## 2022-09-29 RX ORDER — ONDANSETRON 2 MG/ML
8 INJECTION INTRAMUSCULAR; INTRAVENOUS EVERY 8 HOURS
Status: DISCONTINUED | OUTPATIENT
Start: 2022-09-29 | End: 2022-10-02 | Stop reason: HOSPADM

## 2022-09-29 RX ORDER — ONDANSETRON 2 MG/ML
8 INJECTION INTRAMUSCULAR; INTRAVENOUS ONCE
Status: COMPLETED | OUTPATIENT
Start: 2022-09-29 | End: 2022-09-29

## 2022-09-29 RX ORDER — MIDAZOLAM HYDROCHLORIDE 1 MG/ML
2 INJECTION INTRAMUSCULAR; INTRAVENOUS ONCE
Status: COMPLETED | OUTPATIENT
Start: 2022-09-29 | End: 2022-09-29

## 2022-09-29 RX ORDER — SODIUM CHLORIDE 9 MG/ML
20 INJECTION, SOLUTION INTRAVENOUS PRN
Status: ACTIVE | OUTPATIENT
Start: 2022-09-29 | End: 2022-09-30

## 2022-09-29 RX ORDER — ONDANSETRON 2 MG/ML
8 INJECTION INTRAMUSCULAR; INTRAVENOUS EVERY 8 HOURS PRN
Status: DISCONTINUED | OUTPATIENT
Start: 2022-09-29 | End: 2022-10-02 | Stop reason: HOSPADM

## 2022-09-29 RX ORDER — ONDANSETRON 2 MG/ML
8 INJECTION INTRAMUSCULAR; INTRAVENOUS EVERY 8 HOURS
Status: CANCELLED | OUTPATIENT
Start: 2022-09-29

## 2022-09-29 RX ORDER — LIDOCAINE AND PRILOCAINE 25; 25 MG/G; MG/G
CREAM TOPICAL PRN
Status: DISCONTINUED | OUTPATIENT
Start: 2022-09-29 | End: 2022-10-02 | Stop reason: HOSPADM

## 2022-09-29 RX ADMIN — METHOTREXATE 12 MG: 25 INJECTION INTRA-ARTERIAL; INTRAMUSCULAR; INTRATHECAL; INTRAVENOUS at 15:30

## 2022-09-29 RX ADMIN — ONDANSETRON 8 MG: 2 INJECTION INTRAMUSCULAR; INTRAVENOUS at 21:23

## 2022-09-29 RX ADMIN — ONDANSETRON 8 MG: 2 INJECTION INTRAMUSCULAR; INTRAVENOUS at 13:51

## 2022-09-29 RX ADMIN — MERCAPTOPURINE 50 MG: 50 TABLET ORAL at 20:18

## 2022-09-29 RX ADMIN — LIDOCAINE AND PRILOCAINE 1 APPLICATION: 25; 25 CREAM TOPICAL at 14:01

## 2022-09-29 RX ADMIN — SODIUM BICARBONATE: 84 INJECTION, SOLUTION INTRAVENOUS at 14:52

## 2022-09-29 RX ADMIN — MIDAZOLAM 2 MG: 1 INJECTION INTRAMUSCULAR; INTRAVENOUS at 15:14

## 2022-09-29 RX ADMIN — SODIUM BICARBONATE: 84 INJECTION, SOLUTION INTRAVENOUS at 20:13

## 2022-09-29 RX ADMIN — METHOTREXATE: 25 INJECTION, SOLUTION INTRA-ARTERIAL; INTRAMUSCULAR; INTRATHECAL; INTRAVENOUS at 17:00

## 2022-09-29 RX ADMIN — VINCRISTINE SULFATE 2 MG: 1 INJECTION, SOLUTION INTRAVENOUS at 16:00

## 2022-09-29 RX ADMIN — SODIUM BICARBONATE: 84 INJECTION, SOLUTION INTRAVENOUS at 10:16

## 2022-09-29 RX ADMIN — METHOTREXATE: 25 INJECTION, SOLUTION INTRA-ARTERIAL; INTRAMUSCULAR; INTRATHECAL; INTRAVENOUS at 16:30

## 2022-09-29 ASSESSMENT — LIFESTYLE VARIABLES
ON A TYPICAL DAY WHEN YOU DRINK ALCOHOL HOW MANY DRINKS DO YOU HAVE: 0
TOTAL SCORE: 0
ALCOHOL_USE: NO
AVERAGE NUMBER OF DAYS PER WEEK YOU HAVE A DRINK CONTAINING ALCOHOL: 0
TOTAL SCORE: 0
TOTAL SCORE: 0
DOES PATIENT WANT TO STOP DRINKING: NO
CONSUMPTION TOTAL: NEGATIVE
HAVE PEOPLE ANNOYED YOU BY CRITICIZING YOUR DRINKING: NO
EVER FELT BAD OR GUILTY ABOUT YOUR DRINKING: NO
HOW MANY TIMES IN THE PAST YEAR HAVE YOU HAD 5 OR MORE DRINKS IN A DAY: 0
HAVE YOU EVER FELT YOU SHOULD CUT DOWN ON YOUR DRINKING: NO
EVER HAD A DRINK FIRST THING IN THE MORNING TO STEADY YOUR NERVES TO GET RID OF A HANGOVER: NO

## 2022-09-29 ASSESSMENT — PATIENT HEALTH QUESTIONNAIRE - PHQ9
1. LITTLE INTEREST OR PLEASURE IN DOING THINGS: NOT AT ALL
2. FEELING DOWN, DEPRESSED, IRRITABLE, OR HOPELESS: NOT AT ALL
SUM OF ALL RESPONSES TO PHQ9 QUESTIONS 1 AND 2: 0

## 2022-09-29 ASSESSMENT — FIBROSIS 4 INDEX: FIB4 SCORE: 0.28

## 2022-09-29 NOTE — LETTER
Physician Notification of Admission      To: Margarito Arvizu M.D.    [address] [facility] [address]  [facility] [address]    From: Pepe Faye M.D.    Re: Tomas Jean-Baptiste, 2000    Admitted on: 9/29/2022  7:47 AM    Admitting Diagnosis:    Acute Lymphoblastic Leukemia, chemotherapy    Dear Margarito Arvizu M.D.,      Our records indicate that we have admitted a patient to [facility] Pediatrics department who has listed you as their primary care provider, and we wanted to make sure you were aware of this admission. We strive to improve patient care by facilitating active communication with our medical colleagues from around the region.    To speak with a member of the patients care team, please contact the [facility] Pediatric department at [phone].   Thank you for allowing us to participate in the care of your patient.

## 2022-09-29 NOTE — LETTER
Physician Notification of Discharge    Patient name: Tomas Jean-Baptiste     : 2000     MRN: [id number]    Discharge Date/Time: No discharge date for patient encounter.    Discharge Disposition: Discharged to home/self care ()    Discharge DX: There are no discharge diagnoses documented for the most recent discharge.    Discharge Meds:      Medication List      START taking these medications      Instructions   chlorhexidine 0.12 % Soln  Commonly known as: PERIDEX   Swish and spit 15 mL by mouth 2 times a day.  Dose: 15 mL     sulfamethoxazole-trimethoprim 400-80 MG Tabs  Commonly known as: BACTRIM   Take 2 tablets by mouth twice daily every Saturday and         CHANGE how you take these medications      Instructions   imatinib 400 MG tablet  What changed: Another medication with the same name was removed. Continue taking this medication, and follow the directions you see here.  Commonly known as: Gleevec   Take 400 mg by mouth every day. Take 600 mg by mouth every day (1 x 400 mg and 2 x 100 mg tablets)  Dose: 400 mg     mercaptopurine 50 MG Tabs  What changed:   · how much to take  · how to take this  · when to take this  · additional instructions  Commonly known as: PURINETHOL   Doctor's comments: Meds to Beds please.  Take 1 tablet by mouth Monday - Saturday.  Take 0.5 tablets by mouth on .        CONTINUE taking these medications      Instructions   ondansetron 8 MG Tbdp  Commonly known as: Zofran ODT   Dissovle 1 Tablet by mouth every 8 hours as needed for Nausea.  Dose: 8 mg     therapeutic multivitamin-minerals Tabs   Take 1 Tab by mouth every day.  Dose: 1 Tablet     vitamin D3 1000 Unit (25 mcg) Tabs  Commonly known as: cholecalciferol   Take 1,000 Units by mouth every day.  Dose: 1,000 Units        STOP taking these medications    ascorbic acid 500 MG Tabs  Commonly known as: ascorbic acid          Attending Provider:  Pepe Faye M.D.    [facility] Pediatrics Department    PCP: Margarito Arvizu M.D.    To speak with a member of the patients care team, please contact the [facility] Pediatric department -at [phone].   Thank you for allowing us to participate in the care of your patient.

## 2022-09-29 NOTE — PROGRESS NOTES
"Pharmacy Chemotherapy Calculations Note:    Dx: B-ALL, PH+    Cycle: Interim Maintenance SR Arm B (Investigational COG Arm), Day 1  On Study - COG ID number: [id number]  Previous treatment: Induction IB Days 28 on 8/15/22    Protocol: HD MTX Interim Maintenance SR Arm B (Investigational COG Arm) as per [id number]          /78   Pulse 95   Temp 36.7 °C (98.1 °F) (Temporal)   Resp 20   Ht 1.68 m (5' 6.14\")   Wt 74.8 kg (164 lb 14.5 oz)   SpO2 95%   BMI 26.50 kg/m²  Body surface area is 1.87 meters squared.  Dosing values:   Ht 168 cm Wt 74.8 kg BSA 1.87 m²     All labs (9/29/22) within treatment plan parameters.      Vincristine (Oncovin) 1.5 mg/m² x 1.87 m² = 2.81 mg   Dose capped at maximum dose = 2 mg IV    HD Methotrexate 5000 mg/m² x 1.87 m² = 9350 mg given as:     Methotrexate 500 mg/m² x 1.87 m² = 935 mg   <10% difference, okay to treat with final dose = 935 mg IV over 30 min     Methotrexate 4500 mg/m² x 1.87 m² = 8415 mg   <10% difference, okay to treat with final dose = 8415 mg IV over 23.5 hrs     Do not start until urine SpG < 1.010 and pH > 7    Leucovorin 15 mg/m² x 1.87 m² = 28.1 mg   <10% difference, okay to treat with final dose = 28.1 mg IV starting at hour 42 after start of MTX infusion      Kalyn Salgado, PharmD  "

## 2022-09-29 NOTE — PROGRESS NOTES
"Pharmacy Chemotherapy Calculations    Patient Name: Tomas Roy \"JULY\" Estiven     Diagnosis: Ph+ Precursor B-Cell ALL    Protocol: [id number] (On Study, Arm B)       Allergies: Amoxicillin       /78   Pulse 95   Temp 36.7 °C (98.1 °F) (Temporal)   Resp 20   Ht 1.68 m (5' 6.14\")   Wt 74.8 kg (164 lb 14.5 oz)   SpO2 95%   BMI 26.50 kg/m²  Body surface area is 1.87 meters squared.     Labs 9/29/22 are within treatment plan parameters.      Drug Order   (Drug name, dose, route, IV Fluid & volume, frequency, number of doses) Interim Maintenance, Day 1  Previous treatment: Induction IB Day 28 on 8/15/22   Medication = High Dose Methotrexate PF  Base Dose = 5,000 mg/m2 = 500 mg/m2 followed by 4,500 mg/m2   Calc Dose 1: 500 mg/m2 x 1.87 m2 = 935 mg   Calc Dose 2: 4,500 mg/m2 x 1.87 m2 = 8,415 mg  Final Dose 1 = 935 mg   Final Dose 2 = 8,415 mg  Route = IV  Fluid & Volume 1 = Dextrose 5% 50 mL   Fluid & Volume 2 = Dextrose 5% 235 mL   Admin Duration 1 = Over 30 minutes   Admin Duration 2 = Over 23.5 hours          <10% difference, OK to treat with final dose     Medication = Vincristine (ONCOVIN)  Base Dose = 1.5 mg/m2   Calc Dose: 1.5 mg/m2 x 1.87 m2 = 2.8 mg  Final Dose = 2 mg  Route = IV  Fluid & Volume = NS 25 mL   Admin Duration = Over 5 - 10 minutes          OK to treat with MAX dose     By my signature below, I confirm this process was performed independently with the BSA and all final chemotherapy dosing calculations congruent. I have reviewed the above chemotherapy order and that my calculation of the final dose and BSA (when applicable) corroborate those calculations of the  pharmacist. Discrepancies of 10% or greater in the written dose have been addressed and documented within the [facility] Progress notes.    Kayla Juarez, XochiltD, BCPS        "

## 2022-09-29 NOTE — PROGRESS NOTES
Pharmacy Chemotherapy Calculations    Dx: Ph+ ALL     Regimen/Dosing Reference: HD MTX Interim Maintenance SR Arm B (Investigational COG Arm) per [id number]       Allergies: Amoxicillin       Wt 70.4 kg (155 lb 3.3 oz)   BMI 25.24 kg/m²  Body surface area is 1.81 meters squared.     MD to review any labs.       Drug Order   (Drug name, dose, route, IV Fluid & volume, frequency, number of doses) IM SR Arm B, Day 1  Previous treatment: Induction IB Day 28 on 8/15/22   Medication = IT Methotrexate  Base Dose = 12 mg for age > 3 yrs (23 y/o)  Calc Dose: no calculation required  Final Dose = 12 mg  Route = IT  Fluid & Volume = PF NS 6 mL  Admin Duration = administered by MD         <10% difference, OK to treat with final dose     By my signature below, I confirm this process was performed independently with the BSA and all final chemotherapy dosing calculations congruent. I have reviewed the above chemotherapy order and that my calculation of the final dose and BSA (when applicable) corroborate those calculations of the  pharmacist. Discrepancies of 10% or greater in the written dose have been addressed and documented within the [facility] Progress notes.      Kalyn Salgado, PharmD

## 2022-09-29 NOTE — DISCHARGE PLANNING
Chart reviewed by this RN CM.  Patient lives with family in [place].  His PCP is Dr. Margarito Arvizu and he is followed by outpatient oncology.  [facility] and [place] [employer] (secondary insurance).  No CM needs noted at this time.  HCM to remain available to assist with any discharge needs.       Anticipate home when medically cleared.

## 2022-09-30 LAB
ANION GAP SERPL CALC-SCNC: 8 MMOL/L (ref 7–16)
APPEARANCE UR: CLEAR
BACTERIA #/AREA URNS HPF: NEGATIVE /HPF
BACTERIA #/AREA URNS HPF: NEGATIVE /HPF
BILIRUB UR QL STRIP.AUTO: NEGATIVE
BUN SERPL-MCNC: <2 MG/DL (ref 8–22)
CALCIUM SERPL-MCNC: 8.4 MG/DL (ref 8.5–10.5)
CHLORIDE SERPL-SCNC: 103 MMOL/L (ref 96–112)
CO2 SERPL-SCNC: 31 MMOL/L (ref 20–33)
COLOR UR: ABNORMAL
COLOR UR: YELLOW
CREAT SERPL-MCNC: 0.8 MG/DL (ref 0.5–1.4)
EPI CELLS #/AREA URNS HPF: NEGATIVE /HPF
EPI CELLS #/AREA URNS HPF: NEGATIVE /HPF
GFR SERPLBLD CREATININE-BSD FMLA CKD-EPI: 129 ML/MIN/1.73 M 2
GLUCOSE SERPL-MCNC: 100 MG/DL (ref 65–99)
GLUCOSE UR STRIP.AUTO-MCNC: NEGATIVE MG/DL
HYALINE CASTS #/AREA URNS LPF: ABNORMAL /LPF
HYALINE CASTS #/AREA URNS LPF: ABNORMAL /LPF
KETONES UR STRIP.AUTO-MCNC: NEGATIVE MG/DL
LEUKOCYTE ESTERASE UR QL STRIP.AUTO: NEGATIVE
MICRO URNS: ABNORMAL
MICRO URNS: ABNORMAL
MICRO URNS: NORMAL
MTX SERPL-SCNC: 23 UMOL/L
MTX SERPL-SCNC: 85 UMOL/L
NITRITE UR QL STRIP.AUTO: NEGATIVE
PH UR STRIP.AUTO: 7 [PH] (ref 5–8)
PH UR STRIP.AUTO: 7 [PH] (ref 5–8)
PH UR STRIP.AUTO: 7.5 [PH] (ref 5–8)
PH UR STRIP.AUTO: 8 [PH] (ref 5–8)
PH UR STRIP.AUTO: 8 [PH] (ref 5–8)
POTASSIUM SERPL-SCNC: 3.5 MMOL/L (ref 3.6–5.5)
PROT UR QL STRIP: 30 MG/DL
PROT UR QL STRIP: 30 MG/DL
PROT UR QL STRIP: NEGATIVE MG/DL
RBC # URNS HPF: ABNORMAL /HPF
RBC # URNS HPF: ABNORMAL /HPF
RBC UR QL AUTO: NEGATIVE
SODIUM SERPL-SCNC: 142 MMOL/L (ref 135–145)
SP GR UR STRIP.AUTO: 1
SP GR UR STRIP.AUTO: 1
SP GR UR STRIP.AUTO: 1.01
UROBILINOGEN UR STRIP.AUTO-MCNC: 0.2 MG/DL
WBC #/AREA URNS HPF: ABNORMAL /HPF
WBC #/AREA URNS HPF: ABNORMAL /HPF

## 2022-09-30 PROCEDURE — 81001 URINALYSIS AUTO W/SCOPE: CPT

## 2022-09-30 PROCEDURE — 99232 SBSQ HOSP IP/OBS MODERATE 35: CPT | Performed by: PEDIATRICS

## 2022-09-30 PROCEDURE — 700111 HCHG RX REV CODE 636 W/ 250 OVERRIDE (IP): Performed by: PEDIATRICS

## 2022-09-30 PROCEDURE — 81003 URINALYSIS AUTO W/O SCOPE: CPT

## 2022-09-30 PROCEDURE — 700102 HCHG RX REV CODE 250 W/ 637 OVERRIDE(OP): Performed by: PEDIATRICS

## 2022-09-30 PROCEDURE — 700105 HCHG RX REV CODE 258: Performed by: PEDIATRICS

## 2022-09-30 PROCEDURE — 770000 HCHG ROOM/CARE - INTERMEDIATE ICU *

## 2022-09-30 PROCEDURE — A9270 NON-COVERED ITEM OR SERVICE: HCPCS | Performed by: PEDIATRICS

## 2022-09-30 PROCEDURE — 80048 BASIC METABOLIC PNL TOTAL CA: CPT

## 2022-09-30 PROCEDURE — 80299 QUANTITATIVE ASSAY DRUG: CPT

## 2022-09-30 RX ADMIN — IMATINIB MESYLATE 600 MG: 400 TABLET, FILM COATED ORAL at 05:40

## 2022-09-30 RX ADMIN — ONDANSETRON 8 MG: 2 INJECTION INTRAMUSCULAR; INTRAVENOUS at 05:40

## 2022-09-30 RX ADMIN — LORAZEPAM 1 MG: 1 TABLET ORAL at 13:01

## 2022-09-30 RX ADMIN — SODIUM BICARBONATE: 84 INJECTION, SOLUTION INTRAVENOUS at 14:03

## 2022-09-30 RX ADMIN — ONDANSETRON 8 MG: 2 INJECTION INTRAMUSCULAR; INTRAVENOUS at 22:36

## 2022-09-30 RX ADMIN — SODIUM BICARBONATE: 84 INJECTION, SOLUTION INTRAVENOUS at 00:20

## 2022-09-30 RX ADMIN — MERCAPTOPURINE 50 MG: 50 TABLET ORAL at 20:04

## 2022-09-30 RX ADMIN — SODIUM BICARBONATE: 84 INJECTION, SOLUTION INTRAVENOUS at 19:21

## 2022-09-30 RX ADMIN — SODIUM BICARBONATE: 84 INJECTION, SOLUTION INTRAVENOUS at 09:32

## 2022-09-30 RX ADMIN — ONDANSETRON 8 MG: 2 INJECTION INTRAMUSCULAR; INTRAVENOUS at 14:40

## 2022-09-30 NOTE — PROGRESS NOTES
2144- This RN notified Dr. Faye of the pt's Methotrexate level of 61. Per MD he would like another draw 6 hours from the start of MTX. MD to place order.     9169- This RN notified Dr. Faye of the pt's pH of 7.0- per MD okay to continue with current Bicarb fluids and reassess urine as ordered.    0203- This RN notified Dr. Faye of the pt's Methotrexate level pf 85. Per MD next MTX draw will be 24 hours from start of infusion. MD to place order.

## 2022-09-30 NOTE — H&P
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                                                                                                                                                Optional Studies: None             Protocol: International Phase 3 trial in [place] 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      Treatment Plan: [id number](OS), AR-Arm B, Interim Maintenance, Day 1     Height: 1.680 m      Weight: 74.8 kg       BSA: 1.87 m²   (Start of Interim Maintenance 9/29/2022)                                                                                                                                           Performance Status: Karnofsky 100, ECOG  0     Treatment Plan Medications:  (verified 100% compliance)     Was taking imatinib 600 mg PO AM - will increase to 325 mg PO BID     Evaluations / Study Labs (9/29/2022):  WBC 4.4, Hgb 10.9, platelets 140  ANC 2800,     Total Bilirubin 0.3     CSF with WBC 1, RBC <1     Therapy Given (9/29/2022):    Methotrexate 12 mg IT  Vincristine 2 mg  IV  Methotrexate 9,350 mg IV (5,000 mg/m2) over 24 hours  Mercaptopurine 1 tablet pO daily x 6 days and 0.5 tablets x 1 day  Imatinib (start/increase) 325 mg PO BID       Disposition: Admit for HD-MTX therapy.    Pepe Faye MD  Pediatric Hematology / Oncology  [facility]  Cell.  [phone]  Office. [phone]

## 2022-09-30 NOTE — PROGRESS NOTES
4 Eyes Skin Assessment Completed by MONTSERRAT Juárez and MONTSERRAT Branch.    Head WDL  Ears WDL  Nose WDL  Mouth WDL  Neck WDL  Breast/Chest WDL  Shoulder Blades WDL  Spine WDL  (R) Arm/Elbow/Hand Scar/discoloration to right upper arm from prior PICC dressing  (L) Arm/Elbow/Hand WDL  Abdomen Scar  Groin WDL  Scrotum/Coccyx/Buttocks WDL  (R) Leg WDL  (L) Leg WDL  (R) Heel/Foot/Toe WDL  (L) Heel/Foot/Toe WDL          Devices In Places Central Line      Interventions In Place Pillows; reposition devices frequently; PRN/weekly dressing changes    Possible Skin Injury No    Pictures Uploaded Into Epic N/A  Wound Consult Placed N/A  RN Wound Prevention Protocol Ordered No

## 2022-09-30 NOTE — PROGRESS NOTES
Pt demonstrates ability to turn self in bed without assistance of staff. Patient understands importance in prevention of skin breakdown, ulcers, and potential infection. Hourly rounding in effect. RN skin check complete.   Devices in place include: Port.  Skin assessed under devices: Yes.  Confirmed HAPI identified on the following date: N/A   Location of HAPI: N/A.  Wound Care RN following: No.

## 2022-09-30 NOTE — PROGRESS NOTES
Patient receiving inpatient chemotherapy - Interim Maintenance, Day 1.    Afebrile. VSS. Awake and alert in no acute distress.      Port accessed on admission using a 20 g 1 inch trevino needle with 1 attempt. Labs drawn from the line without difficulty. Patient tolerated well.      Chemotherapy dosage calculated independently by MONTSERRAT Juárez and MONTSERRAT Branch and compared to road map for protocol HD MTX Interim Maintenance SR Arm B (Investigational COG Arm). Calculations within 10% of written order. Lab results reviewed.      Premedication and chemo given as ordered, see MAR. Blood return verified prior to, during, and after Vincristine, and verified before starting 24 hr MTX. See Chemotherapy flowsheet. Patient tolerating well so far with no side effects or complications noted. MTX infusion running into noc shift. Roadmap updated; day complete in therapy plan.

## 2022-09-30 NOTE — PROGRESS NOTES
Patient admitted as a direct admit for inpatient chemotherapy, accepted by Dr. Faye. Assumed care of patient. Assessment complete, vital signs stable. No complaints of pain at this time. Patient reoriented to unit, expectations, and routines. Updated on plan of care and questions answered - verbalized understanding. Orders received from MD.

## 2022-09-30 NOTE — PROGRESS NOTES
Pt demonstrates ability to turn self in bed without assistance of staff. Patient understands importance in prevention of skin breakdown, ulcers, and potential infection. Hourly rounding in effect. RN skin check complete.   Devices in place include: implanted port  Skin assessed under devices: Yes.  Confirmed HAPI identified on the following date: NA   Location of HAPI: NA  Wound Care RN following: No  The following interventions are in place: reposition patient and devices frequently; PRN/weekly dressing changes

## 2022-09-30 NOTE — PROCEDURES
Pediatric Oncology Lumbar Puncture  Procedure Note      Patient Name:  Tomas Jean-Baptiste  : 2000   MRN: [id number]    Service Location:  [facility]  Date of Service: 2022  Time: 3:30 PM    Procedure Performed By: Pepe Faye M.D.    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  He is hospitalized for scheduled chemotherapy, Interim Maintenance, Day 1 and scheduled lumbar puncture with intrathecal chemotherapy.  Prior to the procedure, the risks and benefits were discussed with the patient.  Consent for the procedure was signed and placed in the patient's chart.  All pertinent labs and history were reviewed and a complete History and Physical Examination were performed and placed in the medical record.  Tomas Roy remained in his hospital room where the procedure was performed.  All necessary safety equipment per ASA guidelines were available.  A time-out was performed and the patient identified by name,  and medical record number.  Gloves and mask were worn during the entire procedure.  Tomas Roy was prepped and draped in the usual sterile fashion with povoiodine.  He was positioned in the left decubitus position and all landmarks including  superior posterior iliac crest, umbilicus and vertebral bodies were identified by palpation.  A 3 in, 22 gauge spinal needle was introduced into the L3-L4 spinal interspace x 2 without resulting blood or CSF.  A third attempt was made at the same inter space and did result in freely flowing CSF.  6 ml of clear fluid were obtained and sent for cell count and cytology.  Methotrexate 12 mg in 6 mL of NS was verified with the nurse bedside and then then administered into the spinal fluid. Tomas Roy tolerated the procedure without complication or bleeding.      Pepe Faye MD  Pediatric Hematology / Oncology  [facility]  Cell.  [phone]

## 2022-09-30 NOTE — PROGRESS NOTES
Report received from MONTSERRAT Juárez. Per report pt on RA, running fluids @ 230 and Methotrexate at 24mL/hr, no family currently at bedside. Pt resting comfortably in bed, fall precautions in place, bed in lowest locked position, call light within reach.

## 2022-10-01 DIAGNOSIS — C91.00 PHILADELPHIA CHROMOSOME POSITIVE ACUTE LYMPHOBLASTIC LEUKEMIA (ALL) (HCC): ICD-10-CM

## 2022-10-01 LAB
ANION GAP SERPL CALC-SCNC: 9 MMOL/L (ref 7–16)
APPEARANCE UR: CLEAR
BILIRUB UR QL STRIP.AUTO: NEGATIVE
BUN SERPL-MCNC: <2 MG/DL (ref 8–22)
CALCIUM SERPL-MCNC: 8.4 MG/DL (ref 8.5–10.5)
CHLORIDE SERPL-SCNC: 106 MMOL/L (ref 96–112)
CO2 SERPL-SCNC: 26 MMOL/L (ref 20–33)
COLOR UR: YELLOW
CREAT SERPL-MCNC: 0.55 MG/DL (ref 0.5–1.4)
GFR SERPLBLD CREATININE-BSD FMLA CKD-EPI: 145 ML/MIN/1.73 M 2
GLUCOSE SERPL-MCNC: 127 MG/DL (ref 65–99)
GLUCOSE UR STRIP.AUTO-MCNC: NEGATIVE MG/DL
KETONES UR STRIP.AUTO-MCNC: NEGATIVE MG/DL
LEUKOCYTE ESTERASE UR QL STRIP.AUTO: NEGATIVE
MICRO URNS: NORMAL
MTX SERPL-SCNC: 0.27 UMOL/L
MTX SERPL-SCNC: 0.47 UMOL/L
NITRITE UR QL STRIP.AUTO: NEGATIVE
PH UR STRIP.AUTO: 7 [PH] (ref 5–8)
PH UR STRIP.AUTO: 7.5 [PH] (ref 5–8)
PH UR STRIP.AUTO: 8 [PH] (ref 5–8)
POTASSIUM SERPL-SCNC: 3.2 MMOL/L (ref 3.6–5.5)
PROT UR QL STRIP: NEGATIVE MG/DL
RBC UR QL AUTO: NEGATIVE
SODIUM SERPL-SCNC: 141 MMOL/L (ref 135–145)
SP GR UR STRIP.AUTO: 1
SP GR UR STRIP.AUTO: 1.01
UROBILINOGEN UR STRIP.AUTO-MCNC: 0.2 MG/DL

## 2022-10-01 PROCEDURE — 700111 HCHG RX REV CODE 636 W/ 250 OVERRIDE (IP): Performed by: PEDIATRICS

## 2022-10-01 PROCEDURE — 99232 SBSQ HOSP IP/OBS MODERATE 35: CPT | Performed by: PEDIATRICS

## 2022-10-01 PROCEDURE — 81003 URINALYSIS AUTO W/O SCOPE: CPT | Mod: 91

## 2022-10-01 PROCEDURE — 700102 HCHG RX REV CODE 250 W/ 637 OVERRIDE(OP): Performed by: PEDIATRICS

## 2022-10-01 PROCEDURE — 770000 HCHG ROOM/CARE - INTERMEDIATE ICU *

## 2022-10-01 PROCEDURE — RXMED WILLOW AMBULATORY MEDICATION CHARGE: Performed by: PEDIATRICS

## 2022-10-01 PROCEDURE — 80299 QUANTITATIVE ASSAY DRUG: CPT

## 2022-10-01 PROCEDURE — A9270 NON-COVERED ITEM OR SERVICE: HCPCS | Performed by: PEDIATRICS

## 2022-10-01 PROCEDURE — 80048 BASIC METABOLIC PNL TOTAL CA: CPT

## 2022-10-01 PROCEDURE — 700105 HCHG RX REV CODE 258: Performed by: PEDIATRICS

## 2022-10-01 RX ORDER — CHLORHEXIDINE GLUCONATE ORAL RINSE 1.2 MG/ML
15 SOLUTION DENTAL 2 TIMES DAILY
Qty: 473 ML | Refills: 2 | Status: ON HOLD | OUTPATIENT
Start: 2022-10-01 | End: 2023-01-14

## 2022-10-01 RX ORDER — MERCAPTOPURINE 50 MG/1
TABLET ORAL
Qty: 52 TABLET | Refills: 0 | Status: ON HOLD | OUTPATIENT
Start: 2022-10-01 | End: 2022-11-14

## 2022-10-01 RX ADMIN — SODIUM BICARBONATE: 84 INJECTION, SOLUTION INTRAVENOUS at 09:41

## 2022-10-01 RX ADMIN — SODIUM BICARBONATE: 84 INJECTION, SOLUTION INTRAVENOUS at 00:05

## 2022-10-01 RX ADMIN — ONDANSETRON 8 MG: 2 INJECTION INTRAMUSCULAR; INTRAVENOUS at 22:13

## 2022-10-01 RX ADMIN — LEUCOVORIN CALCIUM 28.1 MG: 100 INJECTION, POWDER, LYOPHILIZED, FOR SUSPENSION INTRAMUSCULAR; INTRAVENOUS at 10:41

## 2022-10-01 RX ADMIN — ONDANSETRON 8 MG: 2 INJECTION INTRAMUSCULAR; INTRAVENOUS at 14:15

## 2022-10-01 RX ADMIN — LEUCOVORIN CALCIUM 28.1 MG: 100 INJECTION, POWDER, LYOPHILIZED, FOR SUSPENSION INTRAMUSCULAR; INTRAVENOUS at 22:13

## 2022-10-01 RX ADMIN — LEUCOVORIN CALCIUM 28.1 MG: 100 INJECTION, POWDER, LYOPHILIZED, FOR SUSPENSION INTRAMUSCULAR; INTRAVENOUS at 16:31

## 2022-10-01 RX ADMIN — MERCAPTOPURINE 50 MG: 50 TABLET ORAL at 19:58

## 2022-10-01 RX ADMIN — SODIUM BICARBONATE: 84 INJECTION, SOLUTION INTRAVENOUS at 05:22

## 2022-10-01 RX ADMIN — LORAZEPAM 1 MG: 1 TABLET ORAL at 16:54

## 2022-10-01 RX ADMIN — IMATINIB MESYLATE 600 MG: 400 TABLET, FILM COATED ORAL at 06:24

## 2022-10-01 RX ADMIN — ONDANSETRON 8 MG: 2 INJECTION INTRAMUSCULAR; INTRAVENOUS at 06:24

## 2022-10-01 RX ADMIN — SODIUM BICARBONATE: 84 INJECTION, SOLUTION INTRAVENOUS at 14:41

## 2022-10-01 RX ADMIN — SODIUM BICARBONATE: 84 INJECTION, SOLUTION INTRAVENOUS at 19:15

## 2022-10-01 ASSESSMENT — FIBROSIS 4 INDEX: FIB4 SCORE: 0.7

## 2022-10-01 NOTE — PROGRESS NOTES
Chemotherapy (24 hr HD MTX) completed at 1630. Labs drawn from the line. MD at bedside to update patient on plan of care. Patient with minimal side effects of nausea & 2x vomiting throughout infusion, managed well with Zofran and Ativan.

## 2022-10-01 NOTE — PROGRESS NOTES
Pt demonstrates ability to turn self in bed without assistance of staff. Patient and family understands importance in prevention of skin breakdown, ulcers, and potential infection. Hourly rounding in effect. RN skin check complete.   Devices in place include: implanted port  Skin assessed under devices: Yes  Confirmed HAPI identified on the following date: NA   Location of HAPI: NA  Wound Care RN following: No  The following interventions are in place: reposition patient and devices frequently; PRN and weekly dressing changes

## 2022-10-01 NOTE — PROGRESS NOTES
"Pediatric Hematology/Oncology  Daily Progress Note      Patient Name:  Tomas Jean-Baptiste  : 2000  MRN: [id number]    Location of Service:  [facility]  Date of Service: 2022  Time: 12:00 PM    Hospital Day: 2  Protocol/Treatment Plan: [place] Chromosome Precursor B-Cell Acute Lymphoblastic Leukemia, ON STUDY [id number], SR-Arm B,Interim Maintenance, Day 2    SUBJECTIVE:     No acute events overnight.  Afebrile with T-max 99.2 °F. Tomas Roy reports that he did have some pretty significant nausea with infusion of his methotrexate and that he did vomit 2 times.  He did find relief however with oral Ativan.  No significant complaints of nausea this morning. Tomas Roy denies any headaches or back pain following his lumbar puncture yesterday.  He does not complain of any changes in vision or neurologic Changes.  Appetite and oral intake are decreased but no complaints of any abdominal discomfort or pain.  Tolerating fluids and voiding well.  No other concerns or complaints at this time.    Review of Systems:     Constitutional: Afebrile, overall well but did have significant nausea overnight.  Decreased appetite and oral intake..  HENT: Negative.  Eyes: Negative for visual changes.  Respiratory: Negative for shortness of breath.  Cardiovascular: Negative.  Gastrointestinal: Moderate nausea with vomiting x2.    Genitourinary: Negative.  Musculoskeletal: Negative for arm pain or leg pain.    Skin: Negative for rash or skin infection.  Neurological: Negative for numbness, tingling, sensory changes, weakness or headaches.    Endo/Heme/Allergies: No bruising/bleeding easily.    Psychiatric/Behavioral: Good mood.     OBJECTIVE:     Max Temp: Temp (24hrs), Av °C (98.6 °F), Min:36.8 °C (98.2 °F), Max:37.3 °C (99.2 °F)    Vitals: /78   Pulse 100   Temp 36.8 °C (98.2 °F) (Temporal)   Resp 20   Ht 1.68 m (5' 6.14\")   Wt 74.8 kg (164 lb 14.5 oz)   " SpO2 94%   BMI 26.50 kg/m²     I/O:   Intake/Output Summary (Last 24 hours) at 9/30/2022 2147  Last data filed at 9/30/2022 2006  Gross per 24 hour   Intake 5930 ml   Output 3810 ml   Net 2120 ml       Labs:    Most Recent Hematology Labs:    Lab Results   Component Value Date/Time    WBC 4.4 (L) 09/29/2022 0810    HEMOGLOBIN 10.9 (L) 09/29/2022 0810    .2 (H) 09/29/2022 0810    PLATELETCT 140 (L) 09/29/2022 0810    NEUTS 2.80 09/29/2022 0810       Most Recent Metabolic Panel:    Lab Results   Component Value Date/Time    POTASSIUM 3.5 (L) 09/30/2022 0010    CHLORIDE 103 09/30/2022 0010    CO2 31 09/30/2022 0010    GLUCOSE 100 (H) 09/30/2022 0010    BUN <2 (L) 09/30/2022 0010    CREATININE 0.80 09/30/2022 0010    CALCIUM 8.4 (L) 09/30/2022 0010    ANION 8.0 09/30/2022 0010     Physical Exam:    Constitutional: Appears tired this morning.  Otherwise clinically well-appearing.  HENT: Normocephalic and atraumatic. Alopecia.  No rhinorrhea. Oropharynx is clear and moist.   Eyes: Conjunctivae are normal. EOMI. nonicteric.  Neck: Normal range of motion of neck, no adenopathy.    Cardiovascular: Normal rate, regular rhythm.  No  murmur. DP/radial pulses 2+, cap refill < 2 sec  Pulmonary/Chest: Effort normall. No respiratory distress. Symmetric expansion.  No crackles or wheezes.  Abdomen: Soft. Bowel sounds are normal. No distension and no mass. There is no hepatosplenomegaly.    Genitourinary:  Deferred  Musculoskeletal: Normal range of motion of lower and upper extremities bilaterally.  Neurological: Alert and oriented to person and place.   Skin: Skin is warm, dry and pink.  No rash or evidence of skin infection.   Psychiatric: Mood slightly flattened given overall fatigue.    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 and whose End of Induction  IB MRD was both negative by flow cytometry and  PCR who presents for scheduled chemotherapy admission, Interim Maintenance, Day 2     1) Ph+ Precursor B-Cell Acute Lymphoblastic Leukemia, in MRD Remission:              - 2-3 weeks of symptoms              - Presenting WBC > 440 k/uL, hyperleukocytosis              - Start of Hydroxyurea (1500 mg PO Q8) 2320 on 5/27/2022  - discontinued after only 55 hours              - No steroid pretreatment              - 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              - Testicular status NEGATIVE                   - Flow cytometry of both 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, FISH positive for BCR-ABL1 translocation              - [place] [facility] [id number]              - Initially [place] on [facility] [id number] - but 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                            - Enrolled on [id number] and began study 6/13/2022              - Started imatinib therapy 6/3/2022 (split dosing of imatinib 400 mg PO QAM and 200 mg PO QPM) - continued at Day 15 of Induction 1A with imatinib 600 mg PO daily                - End of Induction IA and IB MRD negative                         - WBC 4.4, Hgb 10.9, platelets 140 on admission              - ANC 2800,  on admission                 [id number], AR Arm B, Interim Maintenance, Day 2:              ** Methotrexate 12 mg IV x 1 dose (COMPLETE)              ** Vincristine 2 mg IV x 1 dose (COMPLETE)              ** Methotrexate 935 IV over 30 minutes (COMPLETE)              ** Methotrexate 8415 mg IV Over 24.5 hours (RUNNING)              ** Leucovorin 28 mg IV Q6H starting at Hour 42              ** Mercaptopurine 50 mg = 1 tab PO  x 6 days of the week, 25 mg = 0.5 tabs x 1 day/week)                   ** Increase imatinib to 325 mg PO BID (weight adjusted dose 635.8 mg rounded to 650 mg)    ## UNABLE TO INCREASE IMATINIB TODAY AS PHARMACY REFUSING TO  MG TABLET ##   - - Will discuss with pharmacy again  tomorrow.  Received imatinib 600 mg again today                 - D5 1/4 NS + 30 mEq sodium bicarbonate at 230 ml/hr (=125 ml/m2/hr)              - Will monitor MTX with intense monitoring protocol however will not act on early MTX values as patient is on study (2hr, 8hr) - obtain MTX levels at 24hr, 42hr, 48hr then daily until MTX <0.1 uM      2hr MTX level 61 uM   6hr MTX level 85 uM     Cr. this AM 0.8 which is just above baseline.  If increasing to 0.9-1.0, will increase fluids        2) Chemotherapy Related Pancytopenia:             - WBC 4.4, Hgb 10.9, platelets 140 on admission              - ANC 2800,  on admission     3) Chemotherapy Related Nausea and Vomiting:               - Zofran scheduled Q8              - Zofran, phenergan and Ativan PRN     4) Sixth Cranial Nerve Palsy (IMPROVED/RESOLVED):             - Followed by Dr. Carranza             - Improvement/Resolution of palsy, still treating some astigmatism      5) At Risk of Opportunistic Lung Infection:             - HOLD  Bactrim SS 2 tabs PO BID on Sat/Sun     6) Anxiety (IMPROVED GREATLY):     7) Social:             - Continue with support             - Continue school as tolerated     8) Access:               - R Port-A-Cath in place    Pepe Faye MD  Pediatric Hematology / Oncology  [facility]  Cell.  [phone]  Office. [phone]

## 2022-10-02 ENCOUNTER — PHARMACY VISIT (OUTPATIENT)
Dept: PHARMACY | Facility: MEDICAL CENTER | Age: 22
End: 2022-10-02
Payer: COMMERCIAL

## 2022-10-02 VITALS
RESPIRATION RATE: 20 BRPM | HEART RATE: 91 BPM | DIASTOLIC BLOOD PRESSURE: 73 MMHG | WEIGHT: 161.82 LBS | OXYGEN SATURATION: 94 % | BODY MASS INDEX: 26.01 KG/M2 | TEMPERATURE: 97.8 F | SYSTOLIC BLOOD PRESSURE: 115 MMHG | HEIGHT: 66 IN

## 2022-10-02 LAB
APPEARANCE UR: CLEAR
BILIRUB UR QL STRIP.AUTO: NEGATIVE
COLOR UR: YELLOW
GLUCOSE UR STRIP.AUTO-MCNC: NEGATIVE MG/DL
KETONES UR STRIP.AUTO-MCNC: NEGATIVE MG/DL
LEUKOCYTE ESTERASE UR QL STRIP.AUTO: NEGATIVE
MICRO URNS: NORMAL
MTX SERPL-SCNC: 0.09 UMOL/L
NITRITE UR QL STRIP.AUTO: NEGATIVE
PH UR STRIP.AUTO: 7.5 [PH] (ref 5–8)
PROT UR QL STRIP: NEGATIVE MG/DL
RBC UR QL AUTO: NEGATIVE
SP GR UR STRIP.AUTO: 1
UROBILINOGEN UR STRIP.AUTO-MCNC: 0.2 MG/DL

## 2022-10-02 PROCEDURE — 700105 HCHG RX REV CODE 258: Performed by: PEDIATRICS

## 2022-10-02 PROCEDURE — 81003 URINALYSIS AUTO W/O SCOPE: CPT

## 2022-10-02 PROCEDURE — 700111 HCHG RX REV CODE 636 W/ 250 OVERRIDE (IP): Performed by: PEDIATRICS

## 2022-10-02 PROCEDURE — 99238 HOSP IP/OBS DSCHRG MGMT 30/<: CPT | Performed by: PEDIATRICS

## 2022-10-02 PROCEDURE — 700102 HCHG RX REV CODE 250 W/ 637 OVERRIDE(OP): Performed by: PEDIATRICS

## 2022-10-02 PROCEDURE — 80299 QUANTITATIVE ASSAY DRUG: CPT

## 2022-10-02 RX ORDER — ONDANSETRON 2 MG/ML
8 INJECTION INTRAMUSCULAR; INTRAVENOUS EVERY 8 HOURS
Status: CANCELLED | OUTPATIENT
Start: 2022-10-17

## 2022-10-02 RX ORDER — LORAZEPAM 2 MG/ML
1 CONCENTRATE ORAL EVERY 6 HOURS PRN
Status: CANCELLED | OUTPATIENT
Start: 2022-10-17

## 2022-10-02 RX ORDER — ONDANSETRON 2 MG/ML
8 INJECTION INTRAMUSCULAR; INTRAVENOUS ONCE
Status: CANCELLED | OUTPATIENT
Start: 2022-10-17

## 2022-10-02 RX ORDER — PROMETHAZINE HYDROCHLORIDE 6.25 MG/5ML
0.25 SYRUP ORAL EVERY 6 HOURS PRN
Status: CANCELLED | OUTPATIENT
Start: 2022-10-17

## 2022-10-02 RX ORDER — DEXTROSE AND SODIUM CHLORIDE 5; .45 G/100ML; G/100ML
INJECTION, SOLUTION INTRAVENOUS ONCE
Status: CANCELLED | OUTPATIENT
Start: 2022-10-17 | End: 2022-10-13

## 2022-10-02 RX ORDER — ONDANSETRON 2 MG/ML
8 INJECTION INTRAMUSCULAR; INTRAVENOUS EVERY 8 HOURS PRN
Status: CANCELLED | OUTPATIENT
Start: 2022-10-17

## 2022-10-02 RX ORDER — LIDOCAINE AND PRILOCAINE 25; 25 MG/G; MG/G
CREAM TOPICAL PRN
Status: CANCELLED | OUTPATIENT
Start: 2022-10-17

## 2022-10-02 RX ORDER — HEPARIN SODIUM (PORCINE) LOCK FLUSH IV SOLN 100 UNIT/ML 100 UNIT/ML
500 SOLUTION INTRAVENOUS
Status: COMPLETED | OUTPATIENT
Start: 2022-10-02 | End: 2022-10-02

## 2022-10-02 RX ORDER — SODIUM CHLORIDE 9 MG/ML
20 INJECTION, SOLUTION INTRAVENOUS PRN
Status: CANCELLED | OUTPATIENT
Start: 2022-10-17

## 2022-10-02 RX ORDER — SODIUM CHLORIDE 9 MG/ML
500 INJECTION, SOLUTION INTRAVENOUS CONTINUOUS
Status: CANCELLED | OUTPATIENT
Start: 2022-10-17

## 2022-10-02 RX ADMIN — IMATINIB MESYLATE 600 MG: 400 TABLET, FILM COATED ORAL at 05:28

## 2022-10-02 RX ADMIN — SODIUM BICARBONATE: 84 INJECTION, SOLUTION INTRAVENOUS at 05:06

## 2022-10-02 RX ADMIN — SODIUM BICARBONATE: 84 INJECTION, SOLUTION INTRAVENOUS at 00:04

## 2022-10-02 RX ADMIN — HEPARIN 500 UNITS: 100 SYRINGE at 11:09

## 2022-10-02 RX ADMIN — LEUCOVORIN CALCIUM 28.1 MG: 100 INJECTION, POWDER, LYOPHILIZED, FOR SUSPENSION INTRAMUSCULAR; INTRAVENOUS at 04:26

## 2022-10-02 RX ADMIN — ONDANSETRON 8 MG: 2 INJECTION INTRAMUSCULAR; INTRAVENOUS at 05:28

## 2022-10-02 ASSESSMENT — PAIN DESCRIPTION - PAIN TYPE
TYPE: ACUTE PAIN

## 2022-10-02 NOTE — PROGRESS NOTES
Patient and mother mentioned that patient normally takes bactrim twice a day on Saturdays and Sundays, and were concerned that he was not prescribed it while in patient. This RN contacted Dr. Faye. Dr. Faye told this RN that the patient will not take bactrim while receiving high dose methotrexate. Patient updated and educated on side effects of taking the medications together.

## 2022-10-02 NOTE — PROGRESS NOTES
"Pediatric Hematology/Oncology  Daily Progress Note      Patient Name:  Tomas Jean-Baptiste  : 2000  MRN: [id number]    Location of Service:  [facility]  Date of Service: 10/2/2022  Time: 10:45 AM    Hospital Day: 4    Protocol / Treatment Plan:  [place] Chromosome Precursor B-Cell Acute Lymphoblastic Leukemia, ON STUDY [id number], SR-Arm B,Interim Maintenance, Day 4    SUBJECTIVE:     No acute events overnight.  Afebrile with T-max 99.1 °F. Tomas Roy did report some mild nausea which is improved this morning.  He did have some small vomitus throughout the night.  No significant abdominal discomfort however.  Decreased appetite and oral intake given nausea.  No complaints of any headaches, changes in vision or neurologic status changes.  No complaints of any shortness of breath or difficulty breathing.  No cough.  Tolerating fluids well at this time.  No changes in management overnight.  No other concerns or complaints at this time.    Review of Systems:     Constitutional: Afebrile, feeling better than yesterday.  I still with poor appetite and still with poor appetite and oral intake.  HENT: Negative.  No mouth sores.  No sore throat.  Eyes: Negative for visual changes.  Respiratory: Negative for shortness of breath.  No cough.  Cardiovascular: Negative.  Gastrointestinal: Mild to moderate nausea.  Not taking Ativan as it makes him feel loopy.  Genitourinary: Negative.  Musculoskeletal: Negative.  Skin: Negative for rash or skin infection.  Neurological: Negative for numbness, tingling, sensory changes, weakness or headaches.    Endo/Heme/Allergies: No bruising/bleeding easily.    Psychiatric/Behavioral: Good mood.     OBJECTIVE:     Max Temp: Temp (24hrs), Av.8 °C (98.2 °F), Min:36.3 °C (97.3 °F), Max:37.3 °C (99.1 °F)    Vitals: /73   Pulse 91   Temp 36.6 °C (97.8 °F) (Oral)   Resp 20   Ht 1.68 m (5' 6.14\")   Wt 73.4 kg (161 lb 13.1 oz)   " SpO2 94%   BMI 26.01 kg/m²     I/O:   Intake/Output Summary (Last 24 hours) at 10/2/2022 1045  Last data filed at 10/2/2022 0820  Gross per 24 hour   Intake 2512.5 ml   Output 3435 ml   Net -922.5 ml       Labs:    Most Recent Hematology Labs:    Lab Results   Component Value Date/Time    WBC 4.4 (L) 09/29/2022 0810    HEMOGLOBIN 10.9 (L) 09/29/2022 0810    .2 (H) 09/29/2022 0810    PLATELETCT 140 (L) 09/29/2022 0810    NEUTS 2.80 09/29/2022 0810       Most Recent Metabolic Panel:    Lab Results   Component Value Date/Time    POTASSIUM 3.2 (L) 10/01/2022 1040    CHLORIDE 106 10/01/2022 1040    CO2 26 10/01/2022 1040    GLUCOSE 127 (H) 10/01/2022 1040    BUN <2 (L) 10/01/2022 1040    CREATININE 0.55 10/01/2022 1040    CALCIUM 8.4 (L) 10/01/2022 1040    ANION 9.0 10/01/2022 1040         9/29/2022: Methotrexate Sensi 61.00 umol/L (Ref range: umol/L)  9/30/2022: Methotrexate Sensi 85.00 umol/L (Ref range: umol/L); Methotrexate Sensi 23.00 umol/L (Ref range: umol/L)  10/1/2022: Methotrexate Sensi 0.47 umol/L (Ref range: umol/L); Methotrexate Sensi 0.27 umol/L (Ref range: umol/L)  10/2/2022: Methotrexate Sensi 0.09 umol/L (Ref range: umol/L)    Physical Exam:    Constitutional: Overall very well-appearing.  HENT: Normocephalic and atraumatic. Alopecia.  No rhinorrhea. Oropharynx is clear and moist.  No evidence of oral ulceration or mouth sores.  No posterior pharyngeal erythema.  Eyes: Conjunctivae are normal. EOMI. nonicteric.  Neck: Normal range of motion of neck, no adenopathy.    Cardiovascular: Normal rate, regular rhythm.  No murmur. DP/radial pulses 2+, cap refill < 2 sec  Pulmonary/Chest: Effort normall. No respiratory distress. Symmetric expansion.  No crackles or wheezes.  Abdomen: Soft. Bowel sounds are normal. No distension and no mass. There is no hepatosplenomegaly.    Genitourinary:  Deferred  Musculoskeletal: Normal range of motion of lower and upper extremities bilaterally.    Neurological: Alert  and oriented to person and place. Exhibits normal muscle tone bilaterally in upper and lower extremities. Gait not assessed.Skin: Skin is warm, dry and pink.  No rash or evidence of skin infection.   Psychiatric: Mood is good.    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 and whose End of Induction  IB MRD was both negative by flow cytometry and PCR who presents for scheduled chemotherapy admission, Interim Maintenance, Day 2     1) Ph+ Precursor B-Cell Acute Lymphoblastic Leukemia, in MRD Remission:              - 2-3 weeks of symptoms              - Presenting WBC > 440 k/uL, hyperleukocytosis              - Start of Hydroxyurea (1500 mg PO Q8) 2320 on 5/27/2022  - discontinued after only 55 hours              - No steroid pretreatment              - 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              - Testicular status NEGATIVE                   - Flow cytometry of both 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, FISH positive for BCR-ABL1 translocation              - Enrolled on [facility] [id number]              - Initially enrolled on [facility] [id number] - but 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                            - Enrolled on [facility] [id number] and began study 6/13/2022              - Started imatinib therapy 6/3/2022 (split dosing of imatinib 400 mg PO QAM and 200 mg PO QPM) - continued at Day 15 of Induction 1A with imatinib 600 mg PO daily                 - End of Induction IA and IB MRD negative                         - WBC 4.4, Hgb 10.9, platelets 140 on admission              - ANC 2800,  on admission                 [id number], AR Arm B, Interim Maintenance, Day 4:              ** Methotrexate 12 mg IV x 1 dose (COMPLETE)              ** Vincristine 2 mg IV x 1 dose (COMPLETE)              ** Methotrexate 935 IV over 30 minutes  (COMPLETE)              ** Methotrexate 8415 mg IV Over 24.5 hours (COMPLETE)              ** Leucovorin 28 mg IV Q6H starting at Hour 42 (received doses at 42, 48, 54)              ** Mercaptopurine 50 mg = 1 tab PO  x 6 days of the week, 25 mg = 0.5 tabs x 1 day/week)                 ** Increase imatinib to 650 mg total daily dose (weight adjusted dose 635.8 mg rounded to 650 mg)                          ## UNABLE TO INCREASE IMATINIB WHILE INPATIENT AS PHARMACY REFUSING TO  MG TABLET (TO GET 50 mg) ##                         - Received imatinib 600 mg PO this AM       ## Now that patient will be discharged, will increase to appropriate 650 mg total daily dose of imatinib - will follow instructions provided in Appendix VI and will give imatinib 400 mg PO QAM and imatinib 250 mg PO QHS ##                 Methotrexate cleared:                2hr MTX level 61 uM              6hr MTX level 85 uM  24hr MTX level 23 uM  42 hr MTX level 0.47 uM  48 hr MTx level 0.27uM       - RTC clinic 2 weeks for scheduled HD-MTX #2 (count dependent)                   2) Chemotherapy Related Pancytopenia:             - WBC 4.4, Hgb 10.9, platelets 140 on admission              - ANC 2800,  on admission     3) Chemotherapy Related Nausea and Vomiting:               - Zofran scheduled Q8              - Zofran, phenergan and Ativan PRN     4) Sixth Cranial Nerve Palsy (IMPROVED/RESOLVED):             - Followed by Dr. Carranza             - Improvement/Resolution of palsy, still treating some astigmatism      5) At Risk of Opportunistic Lung Infection:   - Can resume Bactrim Mon-Tues this week and Sat/Sun again     6) Anxiety (RESOLVED):     7) Social:             - Continue with support             - Continue school as tolerated     8) Access:               - R Port-A-Cath in place     Dishcarge home today with RTC 2 weeks for second HD-MTX    Pepe Faye MD  Pediatric Hematology / Oncology  [facility]  [facility]  Cell.  [phone]  [facility]. [phone]

## 2022-10-02 NOTE — PROGRESS NOTES
Pt demonstrates ability to turn self in bed without assistance of staff. Patient and family understands importance in prevention of skin breakdown, ulcers, and potential infection. Hourly rounding in effect. RN skin check complete.   Devices in place include: R chest port.  Skin assessed under devices: Yes.  Confirmed HAPI identified on the following date: NA   Location of HAPI: NA.  Wound Care RN following: No.  The following interventions are in place: Skin checked with each assessment, patient turns and repositions self as needed.

## 2022-10-02 NOTE — DISCHARGE INSTRUCTIONS
Neutropenia  Neutropenia is a condition that occurs when you have a lower-than-normal level of a type of white blood cell (neutrophil) in your body. Neutrophils are made in the spongy center of large bones (bone marrow), and they fight infections.  Neutrophils are your body's main defense against bacterial and fungal infections. The fewer neutrophils you have and the longer your body remains without them, the greater your risk of getting a severe infection.  What are the causes?  This condition can occur if your body uses up or destroys neutrophils faster than your bone marrow can make them. Neutropenia may be caused by:  A bacterial or fungal infection.  Allergic disorders.  Reactions to some medicines.  An autoimmune disease.  An enlarged spleen.  This condition can also occur if your bone marrow does not produce enough neutrophils. This problem may be caused by:  Cancer.  Cancer treatments, such as radiation or chemotherapy.  Viral infections.  Medicines, such as phenytoin.  Vitamin B12 deficiency.  Diseases of the bone marrow.  Environmental toxins, such as insecticides.  What are the signs or symptoms?  This condition does not usually cause symptoms. If symptoms are present, they are usually caused by an underlying infection. Symptoms of an infection may include:  Fever.  Chills.  Swollen glands.  Oral or anal ulcers.  Cough and shortness of breath.  Rash.  Skin infection.  Fatigue.  How is this diagnosed?  Your health care provider may suspect neutropenia if you have:  A condition that may cause neutropenia.  Symptoms during or after treatment for cancer.  Symptoms of infection, especially fever.  Frequent and unusual infections.  This condition is diagnosed based on your medical history and a physical exam. Tests will also be done, such as:  A complete blood count (CBC).  A procedure to collect a sample of bone marrow for examination (bone marrow biopsy).  A chest X-ray.  A urine culture.  A blood  culture.  How is this treated?  Treatment depends on the underlying cause and severity of your condition. Mild neutropenia may not require treatment. Treatment may include medicines, such as:  Antibiotic medicine given through an IV.  Antiviral medicines.  Antifungal medicines.  A medicine to increase neutrophil production (colony-stimulating factor). You may get this drug through an IV or by injection.  Steroids given through an IV.  If an underlying condition is causing neutropenia, you may need treatment for that condition. If medicines or cancer treatments are causing neutropenia, your health care provider may have you stop the medicines or treatment.  Follow these instructions at home:  Medicines    Take over-the-counter and prescription medicines only as told by your health care provider.  Get a seasonal flu shot (influenza vaccine).  Avoid people who received a vaccine in the past 30 days if that vaccine contained a live version of the germ (live vaccine). You should not get a live vaccine. Common live vaccines are polio, MMR, chicken pox, and shingles vaccines.  Eating and drinking  Do not share food utensils.  Do not eat unpasteurized foods.  Do not eat raw or undercooked meat, eggs, or seafood.  Do not eat unwashed, raw fruits or vegetables.  Lifestyle  Avoid exposure to groups of people or children.  Avoid being around people who are sick.  Avoid being around dirt or dust, such as in construction areas or gardens.  Do not provide direct care for pets. Avoid animal droppings. Do not clean litter boxes and bird cages.  Do not have sex unless your health care provider has approved.  Hygiene    Bathe daily.  Clean the area between the genitals and the anus (perineal area) after you urinate or have a bowel movement. If you are female, wipe from front to back.  Brush your teeth with a soft toothbrush before and after meals.  Do not use a regular razor. Use an electric razor to remove hair.  Wash your hands  often. Make sure others who come in contact with you also wash their hands. If soap and water are not available, use hand .  General instructions  Follow any precautions as told by your health care provider to reduce your risk for injury or infection.  Take actions to avoid cuts and burns. For example:  Be cautious when you use knives. Always cut away from yourself.  Keep knives in protective sheaths or guards when not in use.  Use oven mitts when you cook with a hot stove, oven, or grill.  Stand a safe distance away from open fires.  Do not use tampons, enemas, or rectal suppositories unless your health care provider has approved.  Keep all follow-up visits as told by your health care provider. This is important.  Contact a health care provider if:  You have:  A sore throat.  A warm, red, or tender area on your skin.  A cough.  Frequent or painful urination.  Vaginal discharge or itching.  You develop:  Sores in your mouth or anus.  Swollen lymph nodes.  Red streaks on the skin.  A rash.  Get help right away if:  You have:  A fever.  Chills, or you start to shake.  You feel:  Nauseous, or you vomit.  Very fatigued.  Short of breath.  Summary  Neutropenia is a condition that occurs when you have a lower-than-normal level of a type of white blood cell (neutrophil) in your body.  This condition can occur if your body uses up or destroys neutrophils faster than your bone marrow can make them.  Treatment depends on the underlying cause and severity of your condition. Mild neutropenia may not require treatment.  Follow any precautions as told by your health care provider to reduce your risk for injury or infection.  This information is not intended to replace advice given to you by your health care provider. Make sure you discuss any questions you have with your health care provider.  Document Released: 06/09/2003 Document Revised: 10/03/2019 Document Reviewed: 10/03/2019  Elsevier Patient Education © 2020  [employer]      PATIENT INSTRUCTIONS:      Given by:   Physician and Nurse    Instructed in:  If yes, include date/comment and person who did the instructions               Activity:      Activity as tolerated          Diet::          Diet as tolerated         Medication:  Resume home medications, see attached medication sheet    Equipment:  NA    Treatment:  NA      Other:          Return to primary care physician or emergency department for worsening symptoms or for any new problems, questions, concerns    Education Class:      Patient/Family verbalized/demonstrated understanding of above Instructions:  yes  __________________________________________________________________________    OBJECTIVE CHECKLIST  Patient/Family has:    All medications brought from home   NA  Valuables from safe                            NA  Prescriptions                                       Yes  All personal belongings                       Yes  Equipment (oxygen, apnea monitor, wheelchair)     NA  Other:     _________________________________________________________________________    _________________________________________________________________________    Rehabilitation Follow-up:     Special Needs on Discharge (Specify)

## 2022-10-02 NOTE — PROGRESS NOTES
"Pediatric Hematology/Oncology  Daily Progress Note      Patient Name:  Tomas Jean-Baptiste  : 2000  MRN: [id number]    Location of Service:  [facility]  Date of Service: 10/1/2022  Time: 11:00 AM    Hospital Day: 3    Protocol / Treatment Plan:   [place] Chromosome Precursor B-Cell Acute Lymphoblastic Leukemia, ON STUDY [id number], SR-Arm B,Interim Maintenance, Day 3    SUBJECTIVE:     No acute events overnight.  Afebrile.  Does complain that he has some persistent nausea, but overall tolerable. No vomiting.  Tomas Roy reports decreased appetite, but he is still taking PO.  No complaints of any mouth sores or throat pain.  No other concerns or complaints.      Review of Systems:     Constitutional: Afebrile.  Tired.  Decreased PO.  HENT: Negative.  Eyes: Negative for visual changes.  Respiratory: Negative for shortness of breath..   Cardiovascular: Negative for chest pain and leg swelling.    Gastrointestinal: Nausea, vomiting yesterday, but none overnight.  Genitourinary: Negative.  Musculoskeletal: Negative for arm pain or leg pain.    Skin: Negative for rash or skin infection.  Neurological: Negative for numbness, tingling, sensory changes, weakness or headaches.    Endo/Heme/Allergies: No bruising/bleeding easily.    Psychiatric/Behavioral: Good mood.     OBJECTIVE:     Max Temp: Temp (24hrs), Av.7 °C (98.1 °F), Min:36.3 °C (97.3 °F), Max:37.3 °C (99.1 °F)      Vitals: /58   Pulse 76   Temp 37.3 °C (99.1 °F) (Temporal)   Resp 18   Ht 1.68 m (5' 6.14\")   Wt 73.4 kg (161 lb 13.1 oz)   SpO2 93%   BMI 26.01 kg/m²     I/O:   Intake/Output Summary (Last 24 hours) at 10/1/2022 2002  Last data filed at 10/1/2022 1915  Gross per 24 hour   Intake 5992.5 ml   Output 5400 ml   Net 592.5 ml       Labs:    Most Recent Hematology Labs:    Lab Results   Component Value Date/Time    WBC 4.4 (L) 2022 0810    HEMOGLOBIN 10.9 (L) 2022 0810    MCV " 101.2 (H) 09/29/2022 0810    PLATELETCT 140 (L) 09/29/2022 0810    NEUTS 2.80 09/29/2022 0810       Most Recent Metabolic Panel:    Lab Results   Component Value Date/Time    POTASSIUM 3.2 (L) 10/01/2022 1040    CHLORIDE 106 10/01/2022 1040    CO2 26 10/01/2022 1040    GLUCOSE 127 (H) 10/01/2022 1040    BUN <2 (L) 10/01/2022 1040    CREATININE 0.55 10/01/2022 1040    CALCIUM 8.4 (L) 10/01/2022 1040    ANION 9.0 10/01/2022 1040     9/29/2022: Methotrexate Sensi 61.00 umol/L (Ref range: umol/L)  9/30/2022: Methotrexate Sensi 85.00 umol/L (Ref range: umol/L); Methotrexate Sensi 23.00 umol/L (Ref range: umol/L)  10/1/2022: Methotrexate Sensi 0.47 umol/L (Ref range: umol/L); Methotrexate Sensi 0.27 umol/L (Ref range: umol/L)    Physical Exam:    Constitutional: Very well appearing.  HENT: Normocephalic and atraumatic. Alopecia.  No rhinorrhea. Oropharynx is clear and moist.   Eyes: Conjunctivae are normal. EOMI. Non icteric.  Neck: Normal range of motion of neck, no adenopathy.    Cardiovascular: Normal rate, regular rhythm.  No murmur. DP/radial pulses 2+, cap refill < 2 sec.  Pulmonary/Chest: Effort normall. No respiratory distress. Symmetric expansion.  No crackles or wheezes.  Abdomen: Soft. Bowel sounds are normal. No distension and no mass. There is no hepatosplenomegaly.    Genitourinary:  Deferred  Musculoskeletal: Normal range of motion of lower and upper extremities bilaterally. Neurological: Alert and oriented to person and place. Exhibits normal muscle tone bilaterally in upper and lower extremities. Gait not assessed.  Skin: Skin is warm, dry and pink.  No rash or evidence of skin infection.   Psychiatric: Mood stable.    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 and whose End of Induction  IB MRD was both negative by flow cytometry and PCR who presents for scheduled chemotherapy admission,  Interim Maintenance, Day 3      1) Ph+ Precursor B-Cell Acute Lymphoblastic Leukemia, in MRD Remission:              - 2-3 weeks of symptoms              - Presenting WBC > 440 k/uL, hyperleukocytosis              - Start of Hydroxyurea (1500 mg PO Q8) 2320 on 5/27/2022  - discontinued after only 55 hours              - No steroid pretreatment              - 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              - Testicular status NEGATIVE                   - Flow cytometry of both 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, FISH positive for BCR-ABL1 translocation              - Enrolled on [facility] [id number]              - Initially enrolled on [facility] [id number] - but 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                            - Enrolled on [facility] [id number] and began study 6/13/2022              - Started imatinib therapy 6/3/2022 (split dosing of imatinib 400 mg PO QAM and 200 mg PO QPM) - continued at Day 15 of Induction 1A with imatinib 600 mg PO daily                 - End of Induction IA and IB MRD negative                         - WBC 4.4, Hgb 10.9, platelets 140 on admission              - ANC 2800,  on admission                 [id number], AR Arm B, Interim Maintenance, Day 3:              ** Methotrexate 12 mg IV x 1 dose (COMPLETE)              ** Vincristine 2 mg IV x 1 dose (COMPLETE)              ** Methotrexate 935 IV over 30 minutes (COMPLETE)              ** Methotrexate 8415 mg IV Over 24.5 hours (COMPLETE)              ** Leucovorin 28 mg IV Q6H starting at Hour 42              ** Mercaptopurine 50 mg = 1 tab PO  x 6 days of the week, 25 mg = 0.5 tabs x 1 day/week) - Rx sent to pharmacy.                               ** Increase imatinib to 325 mg PO BID (weight adjusted dose 635.8 mg rounded to 650 mg)                          ## UNABLE TO INCREASE IMATINIB AS PHARMACY REFUSING TO  MG TABLET - discussed again today.  Will address with  director of pharmacy.  Also some unclear language in protocol.  Question of whether to round and then split dose or whether to split and then round  ##              -   Received imatinib 600 mg again today                 - D5 1/4 NS + 30 mEq sodium bicarbonate at 230 ml/hr (=125 ml/m2/hr)              - Will monitor MTX with intense monitoring protocol however will not act on early MTX values as patient is on study (2hr, 8hr) - obtain MTX levels at 24hr, 42hr, 48hr then daily until MTX <0.1 uM                            2hr MTX level 61 uM              6hr MTX level 85 uM  24hr MTX level 23 uM  42 hr MTX level 0.47 uM  48 hr MTx level 0.27uM                  Cr. this AM back to 0.55 which is at baseline.                  2) Chemotherapy Related Pancytopenia:             - WBC 4.4, Hgb 10.9, platelets 140 on admission              - ANC 2800,  on admission     3) Chemotherapy Related Nausea and Vomiting:               - Zofran scheduled Q8              - Zofran, phenergan and Ativan PRN     4) Sixth Cranial Nerve Palsy (IMPROVED/RESOLVED):             - Followed by Dr. Carranza             - Improvement/Resolution of palsy, still treating some astigmatism      5) At Risk of Opportunistic Lung Infection:             - HOLD Bactrim SS 2 tabs PO BID on Sat/Sun     6) Anxiety (IMPROVED GREATLY):     7) Social:             - Continue with support             - Continue school as tolerated     8) Access:               - R Port-A-Cath in place    Pepe Faye MD  Pediatric Hematology / Oncology  [facility]  Cell.  [phone]  Office. [phone]

## 2022-10-11 PROCEDURE — 99239 HOSP IP/OBS DSCHRG MGMT >30: CPT | Performed by: PEDIATRICS
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
## 2022-10-13 ENCOUNTER — HOSPITAL ENCOUNTER (OUTPATIENT)
Dept: INFUSION CENTER | Facility: MEDICAL CENTER | Age: 22
End: 2022-10-13
Attending: PEDIATRICS
Payer: COMMERCIAL

## 2022-10-13 VITALS
OXYGEN SATURATION: 97 % | DIASTOLIC BLOOD PRESSURE: 67 MMHG | RESPIRATION RATE: 18 BRPM | BODY MASS INDEX: 26.61 KG/M2 | HEART RATE: 88 BPM | HEIGHT: 66 IN | TEMPERATURE: 97.7 F | WEIGHT: 165.57 LBS | SYSTOLIC BLOOD PRESSURE: 114 MMHG

## 2022-10-13 DIAGNOSIS — Z51.11 ENCOUNTER FOR ANTINEOPLASTIC CHEMOTHERAPY: ICD-10-CM

## 2022-10-13 DIAGNOSIS — C91.00 PHILADELPHIA CHROMOSOME POSITIVE ACUTE LYMPHOBLASTIC LEUKEMIA (ALL) (HCC): ICD-10-CM

## 2022-10-13 DIAGNOSIS — C91.Z0 B LYMPHOBLASTIC LEUKEMIA WITH T(9;22)(Q34;Q11.2);BCR-ABL1 (HCC): ICD-10-CM

## 2022-10-13 PROBLEM — D64.89 OTHER SPECIFIED ANEMIAS: Status: RESOLVED | Noted: 2022-06-13 | Resolved: 2022-10-13

## 2022-10-13 PROBLEM — D69.6 THROMBOCYTOPENIA (HCC): Status: RESOLVED | Noted: 2022-06-13 | Resolved: 2022-10-13

## 2022-10-13 PROBLEM — R04.0 EPISTAXIS, RECURRENT: Status: RESOLVED | Noted: 2022-06-02 | Resolved: 2022-10-13

## 2022-10-13 PROBLEM — Z01.89 ENCOUNTER FOR LUMBAR PUNCTURE: Status: RESOLVED | Noted: 2022-07-12 | Resolved: 2022-10-13

## 2022-10-13 LAB
ALBUMIN SERPL BCP-MCNC: 4.7 G/DL (ref 3.2–4.9)
ALBUMIN/GLOB SERPL: 2.6 G/DL
ALP SERPL-CCNC: 67 U/L (ref 30–99)
ALT SERPL-CCNC: 72 U/L (ref 2–50)
ANION GAP SERPL CALC-SCNC: 14 MMOL/L (ref 7–16)
APPEARANCE UR: CLEAR
AST SERPL-CCNC: 30 U/L (ref 12–45)
BASOPHILS # BLD AUTO: 0.3 % (ref 0–1.8)
BASOPHILS # BLD: 0.01 K/UL (ref 0–0.12)
BILIRUB CONJ SERPL-MCNC: <0.2 MG/DL (ref 0.1–0.5)
BILIRUB INDIRECT SERPL-MCNC: NORMAL MG/DL (ref 0–1)
BILIRUB SERPL-MCNC: 0.2 MG/DL (ref 0.1–1.5)
BILIRUB UR QL STRIP.AUTO: NEGATIVE
BUN SERPL-MCNC: 13 MG/DL (ref 8–22)
CALCIUM SERPL-MCNC: 8.9 MG/DL (ref 8.5–10.5)
CHLORIDE SERPL-SCNC: 106 MMOL/L (ref 96–112)
CO2 SERPL-SCNC: 21 MMOL/L (ref 20–33)
COLOR UR: YELLOW
CREAT SERPL-MCNC: 0.67 MG/DL (ref 0.5–1.4)
EOSINOPHIL # BLD AUTO: 0.13 K/UL (ref 0–0.51)
EOSINOPHIL NFR BLD: 3.4 % (ref 0–6.9)
ERYTHROCYTE [DISTWIDTH] IN BLOOD BY AUTOMATED COUNT: 63.5 FL (ref 35.9–50)
GFR SERPLBLD CREATININE-BSD FMLA CKD-EPI: 136 ML/MIN/1.73 M 2
GLOBULIN SER CALC-MCNC: 1.8 G/DL (ref 1.9–3.5)
GLUCOSE SERPL-MCNC: 104 MG/DL (ref 65–99)
GLUCOSE UR STRIP.AUTO-MCNC: NEGATIVE MG/DL
HCT VFR BLD AUTO: 34.2 % (ref 42–52)
HGB BLD-MCNC: 11.3 G/DL (ref 14–18)
IMM GRANULOCYTES # BLD AUTO: 0.02 K/UL (ref 0–0.11)
IMM GRANULOCYTES NFR BLD AUTO: 0.5 % (ref 0–0.9)
KETONES UR STRIP.AUTO-MCNC: NEGATIVE MG/DL
LEUKOCYTE ESTERASE UR QL STRIP.AUTO: NEGATIVE
LYMPHOCYTES # BLD AUTO: 0.76 K/UL (ref 1–4.8)
LYMPHOCYTES NFR BLD: 20.1 % (ref 22–41)
MCH RBC QN AUTO: 34.3 PG (ref 27–33)
MCHC RBC AUTO-ENTMCNC: 33 G/DL (ref 33.7–35.3)
MCV RBC AUTO: 104 FL (ref 81.4–97.8)
MICRO URNS: NORMAL
MONOCYTES # BLD AUTO: 0.29 K/UL (ref 0–0.85)
MONOCYTES NFR BLD AUTO: 7.7 % (ref 0–13.4)
NEUTROPHILS # BLD AUTO: 2.58 K/UL (ref 1.82–7.42)
NEUTROPHILS NFR BLD: 68 % (ref 44–72)
NITRITE UR QL STRIP.AUTO: NEGATIVE
NRBC # BLD AUTO: 0 K/UL
NRBC BLD-RTO: 0 /100 WBC
PH UR STRIP.AUTO: 5.5 [PH] (ref 5–8)
PLATELET # BLD AUTO: 46 K/UL (ref 164–446)
PMV BLD AUTO: 10.8 FL (ref 9–12.9)
POTASSIUM SERPL-SCNC: 3.9 MMOL/L (ref 3.6–5.5)
PROT SERPL-MCNC: 6.5 G/DL (ref 6–8.2)
PROT UR QL STRIP: NEGATIVE MG/DL
RBC # BLD AUTO: 3.29 M/UL (ref 4.7–6.1)
RBC UR QL AUTO: NEGATIVE
SODIUM SERPL-SCNC: 141 MMOL/L (ref 135–145)
SP GR UR STRIP.AUTO: 1.02
UROBILINOGEN UR STRIP.AUTO-MCNC: 0.2 MG/DL
WBC # BLD AUTO: 3.8 K/UL (ref 4.8–10.8)

## 2022-10-13 PROCEDURE — 85025 COMPLETE CBC W/AUTO DIFF WBC: CPT

## 2022-10-13 PROCEDURE — 82248 BILIRUBIN DIRECT: CPT

## 2022-10-13 PROCEDURE — A4212 NON CORING NEEDLE OR STYLET: HCPCS

## 2022-10-13 PROCEDURE — 99214 OFFICE O/P EST MOD 30 MIN: CPT | Performed by: PEDIATRICS

## 2022-10-13 PROCEDURE — 36591 DRAW BLOOD OFF VENOUS DEVICE: CPT

## 2022-10-13 PROCEDURE — 81003 URINALYSIS AUTO W/O SCOPE: CPT

## 2022-10-13 PROCEDURE — 80053 COMPREHEN METABOLIC PANEL: CPT

## 2022-10-13 PROCEDURE — 700111 HCHG RX REV CODE 636 W/ 250 OVERRIDE (IP): Performed by: PEDIATRICS

## 2022-10-13 RX ORDER — 0.9 % SODIUM CHLORIDE 0.9 %
20 VIAL (ML) INJECTION PRN
Status: CANCELLED | OUTPATIENT
Start: 2022-10-13

## 2022-10-13 RX ORDER — HEPARIN SODIUM,PORCINE 10 UNIT/ML
30 VIAL (ML) INTRAVENOUS PRN
Status: CANCELLED | OUTPATIENT
Start: 2022-10-13

## 2022-10-13 RX ORDER — HEPARIN SODIUM (PORCINE) LOCK FLUSH IV SOLN 100 UNIT/ML 100 UNIT/ML
500 SOLUTION INTRAVENOUS PRN
Status: DISCONTINUED | OUTPATIENT
Start: 2022-10-13 | End: 2022-10-14 | Stop reason: HOSPADM

## 2022-10-13 RX ORDER — HEPARIN SODIUM (PORCINE) LOCK FLUSH IV SOLN 100 UNIT/ML 100 UNIT/ML
500 SOLUTION INTRAVENOUS PRN
Status: CANCELLED | OUTPATIENT
Start: 2022-10-13

## 2022-10-13 RX ADMIN — Medication 500 UNITS: at 09:08

## 2022-10-13 ASSESSMENT — FIBROSIS 4 INDEX: FIB4 SCORE: 0.7

## 2022-10-13 NOTE — PROGRESS NOTES
Pt to [facility] for lab draw, , and admission for inpatient chemotherapy.      Afebrile.  VSS.  Awake and alert in no acute distress.      Port accessed using a 22g 3/4 inch trevino needle with 1 attempt.  Labs drawn from the port without difficulty.   Urine collected upon arrival and sent to lab. Pt tolerated well.

## 2022-10-13 NOTE — PROGRESS NOTES
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 0.67  - AST 30, ALT 72, bilirubin 0.2                  - NOT meeting with criterion for proceeding with second block of high-dose methotrexate, Interim Maintenance, Day 15 due to suppression of platelet count at 46 - WILL HOLD CHEMOTHERAPY (WITH EXCEPTION OF IMATINIB)   - Return to clinic on 10/17/2022 for reevaluation of counts and possible admission for Day 15 of Interim Maintenance                SHORTY GOULD Arm B, Interim Maintenance, Day 15: (HELD DUE TO LOW PLATELETS)                ** Vincristine 2 mg IV x 1 dose (HOLD)              ** Methotrexate 935 IV over 30 minutes (HOLD)              ** Methotrexate 8415 mg IV Over 24.5 hours (HOLD)              ** Leucovorin 28 mg IV Q6H starting at Hour 42 (HOLD)              ** Mercaptopurine 50 mg = 1 tab PO  x 6 days of the week, 25 mg = 0.5 tabs x 1 day/week) (HOLD UNTIL COUNTS IMPROVE)              ** Continue Imatinib 400 mg PO QAM and 250 PO QPM                   2) Chemotherapy Related Pancytopenia:              - WBC 3.8, Hgb 11.3, platelets 46  -  ANC 2580,    - No transfusion indicated     3) Sixth Cranial Nerve Palsy (IMPROVED/RESOLVED):             - Followed by Dr. Carranza             - Improvement/Resolution of palsy, still treating some astigmatism      4) At Risk of Opportunistic Lung Infection:             - Continue Bactrim SS 2 tabs PO BID on Sat/Sun (WILL HOLD THIS WEEKEND IN ANTICIPATION OF MONDAY ADMISSION)     5) Anxiety (IMPROVED GREATLY):     6) Social:             - Continue with support             - Continue school as tolerated     7) Access:               - R Port-A-Cath in place      8) Research Participant:          [facility] - Source Data       Diagnosis: Ph+ Precursor B-Cell Acute Lymphoblastic Leukemia     Disease Status: EOI1A MRD NEGATIVE, EOI1B RD NEGATIVE, CNS3c, testicular negative, HSV1 IgG POSITIVE, CMV IgG NEGATIVE, VARICELLA IgG POSITIVE     Active Studies: [id number], [id number]                                                                                                      Inactive Studies: [id number]                                                                                                                                                Optional Studies: None             Protocol: International Phase 3 trial in [place] 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      Treatment Plan: [id number](OS), AR-Arm B, Interim Maintenance, Day 15 (HOLD)     Height: 1.680 m      Weight: 74.8 kg       BSA: 1.87 m²   (Start of Interim Maintenance 9/29/2022)                                                                                                                                           Performance Status: Karnofsky 100, ECOG  0     Treatment Plan Medications:  (verified 100% compliance)     Currently taking imatinib 400 mg PO QAM and 250 mg QPM    Currently taking 6-MP 1 tab PO QHS x 6 days and 0.5  tab PO QHS x 1 day - WILL HOLD 6-MP for counts - will resume with next dose of HD-MTX (, PLT 75) - will NOT make up missed doses     Evaluations / Study Labs (10/13/2022):  WBC 3.8, Hgb 11.3, platelets 46  ANC 2580,     Total Bilirubin 0.2    WILL REPEAT COUNTS 10/17/2022      Therapy Given (10/13/2022):     NO THERAPY ADMINISTERED  Mercaptopurine 1 tablet pO daily x 6 days and 0.5 tablets x 1 day (HELD)  Imatinib (CONTINUED) 400 mg PO QAM and 25 mg PO QPM         Disposition: Return to clinic on 10/17/2022 ([facility]) for chemotherapy readiness for Interim Maintenance, Day 15    Pepe Faye MD  Pediatric Hematology / Oncology  [facility]  Cell.  [phone]  Office. [phone]

## 2022-10-14 ENCOUNTER — TELEPHONE (OUTPATIENT)
Dept: INFUSION CENTER | Facility: MEDICAL CENTER | Age: 22
End: 2022-10-14
Payer: COMMERCIAL

## 2022-10-17 ENCOUNTER — HOSPITAL ENCOUNTER (INPATIENT)
Facility: MEDICAL CENTER | Age: 22
LOS: 4 days | DRG: 837 | End: 2022-10-21
Attending: PEDIATRICS | Admitting: PEDIATRICS
Payer: COMMERCIAL

## 2022-10-17 ENCOUNTER — HOSPITAL ENCOUNTER (OUTPATIENT)
Dept: INFUSION CENTER | Facility: MEDICAL CENTER | Age: 22
End: 2022-10-17
Attending: PEDIATRICS
Payer: COMMERCIAL

## 2022-10-17 VITALS
SYSTOLIC BLOOD PRESSURE: 108 MMHG | OXYGEN SATURATION: 97 % | BODY MASS INDEX: 27.88 KG/M2 | HEIGHT: 65 IN | TEMPERATURE: 98.5 F | WEIGHT: 167.33 LBS | HEART RATE: 99 BPM | RESPIRATION RATE: 16 BRPM | DIASTOLIC BLOOD PRESSURE: 81 MMHG

## 2022-10-17 DIAGNOSIS — Z51.11 ENCOUNTER FOR ANTINEOPLASTIC CHEMOTHERAPY: ICD-10-CM

## 2022-10-17 DIAGNOSIS — C91.Z0 B LYMPHOBLASTIC LEUKEMIA WITH T(9;22)(Q34;Q11.2);BCR-ABL1 (HCC): ICD-10-CM

## 2022-10-17 DIAGNOSIS — C91.00 PHILADELPHIA CHROMOSOME POSITIVE ACUTE LYMPHOBLASTIC LEUKEMIA (ALL) (HCC): ICD-10-CM

## 2022-10-17 LAB
ALBUMIN SERPL BCP-MCNC: 4.7 G/DL (ref 3.2–4.9)
ALBUMIN/GLOB SERPL: 2.9 G/DL
ALP SERPL-CCNC: 70 U/L (ref 30–99)
ALT SERPL-CCNC: 49 U/L (ref 2–50)
ANION GAP SERPL CALC-SCNC: 12 MMOL/L (ref 7–16)
ANISOCYTOSIS BLD QL SMEAR: ABNORMAL
APPEARANCE UR: CLEAR
AST SERPL-CCNC: 24 U/L (ref 12–45)
BACTERIA #/AREA URNS HPF: NEGATIVE /HPF
BASOPHILS # BLD AUTO: 0.4 % (ref 0–1.8)
BASOPHILS # BLD: 0.01 K/UL (ref 0–0.12)
BILIRUB CONJ SERPL-MCNC: <0.2 MG/DL (ref 0.1–0.5)
BILIRUB INDIRECT SERPL-MCNC: NORMAL MG/DL (ref 0–1)
BILIRUB SERPL-MCNC: 0.4 MG/DL (ref 0.1–1.5)
BILIRUB UR QL STRIP.AUTO: NEGATIVE
BUN SERPL-MCNC: 11 MG/DL (ref 8–22)
CALCIUM SERPL-MCNC: 8.8 MG/DL (ref 8.5–10.5)
CHLORIDE SERPL-SCNC: 106 MMOL/L (ref 96–112)
CO2 SERPL-SCNC: 23 MMOL/L (ref 20–33)
COLOR UR: YELLOW
COMMENT 1642: NORMAL
CREAT SERPL-MCNC: 0.58 MG/DL (ref 0.5–1.4)
EOSINOPHIL # BLD AUTO: 0.11 K/UL (ref 0–0.51)
EOSINOPHIL NFR BLD: 4.2 % (ref 0–6.9)
EPI CELLS #/AREA URNS HPF: NEGATIVE /HPF
ERYTHROCYTE [DISTWIDTH] IN BLOOD BY AUTOMATED COUNT: 66.6 FL (ref 35.9–50)
GFR SERPLBLD CREATININE-BSD FMLA CKD-EPI: 142 ML/MIN/1.73 M 2
GLOBULIN SER CALC-MCNC: 1.6 G/DL (ref 1.9–3.5)
GLUCOSE SERPL-MCNC: 94 MG/DL (ref 65–99)
GLUCOSE UR STRIP.AUTO-MCNC: NEGATIVE MG/DL
HCT VFR BLD AUTO: 33.2 % (ref 42–52)
HGB BLD-MCNC: 10.9 G/DL (ref 14–18)
HYALINE CASTS #/AREA URNS LPF: ABNORMAL /LPF
HYPOCHROMIA BLD QL SMEAR: ABNORMAL
IMM GRANULOCYTES # BLD AUTO: 0 K/UL (ref 0–0.11)
IMM GRANULOCYTES NFR BLD AUTO: 0 % (ref 0–0.9)
KETONES UR STRIP.AUTO-MCNC: NEGATIVE MG/DL
LEUKOCYTE ESTERASE UR QL STRIP.AUTO: NEGATIVE
LYMPHOCYTES # BLD AUTO: 0.57 K/UL (ref 1–4.8)
LYMPHOCYTES NFR BLD: 21.6 % (ref 22–41)
MACROCYTES BLD QL SMEAR: ABNORMAL
MCH RBC QN AUTO: 34.1 PG (ref 27–33)
MCHC RBC AUTO-ENTMCNC: 32.8 G/DL (ref 33.7–35.3)
MCV RBC AUTO: 103.8 FL (ref 81.4–97.8)
MICRO URNS: ABNORMAL
MICRO URNS: NORMAL
MONOCYTES # BLD AUTO: 0.24 K/UL (ref 0–0.85)
MONOCYTES NFR BLD AUTO: 9.1 % (ref 0–13.4)
MORPHOLOGY BLD-IMP: NORMAL
NEUTROPHILS # BLD AUTO: 1.71 K/UL (ref 1.82–7.42)
NEUTROPHILS NFR BLD: 64.7 % (ref 44–72)
NITRITE UR QL STRIP.AUTO: NEGATIVE
NRBC # BLD AUTO: 0 K/UL
NRBC BLD-RTO: 0 /100 WBC
PH UR STRIP.AUTO: 6.5 [PH] (ref 5–8)
PH UR STRIP.AUTO: 7 [PH] (ref 5–8)
PLATELET # BLD AUTO: 80 K/UL (ref 164–446)
PLATELET BLD QL SMEAR: NORMAL
PMV BLD AUTO: 10.2 FL (ref 9–12.9)
POTASSIUM SERPL-SCNC: 3.8 MMOL/L (ref 3.6–5.5)
PROT SERPL-MCNC: 6.3 G/DL (ref 6–8.2)
PROT UR QL STRIP: 30 MG/DL
PROT UR QL STRIP: NEGATIVE MG/DL
RBC # BLD AUTO: 3.2 M/UL (ref 4.7–6.1)
RBC # URNS HPF: ABNORMAL /HPF
RBC BLD AUTO: PRESENT
RBC UR QL AUTO: NEGATIVE
SODIUM SERPL-SCNC: 141 MMOL/L (ref 135–145)
SP GR UR STRIP.AUTO: 1.01
SP GR UR STRIP.AUTO: 1.03
UROBILINOGEN UR STRIP.AUTO-MCNC: 0.2 MG/DL
WBC # BLD AUTO: 2.6 K/UL (ref 4.8–10.8)
WBC #/AREA URNS HPF: ABNORMAL /HPF

## 2022-10-17 PROCEDURE — 36591 DRAW BLOOD OFF VENOUS DEVICE: CPT

## 2022-10-17 PROCEDURE — 81003 URINALYSIS AUTO W/O SCOPE: CPT | Mod: 91

## 2022-10-17 PROCEDURE — 80053 COMPREHEN METABOLIC PANEL: CPT

## 2022-10-17 PROCEDURE — 700105 HCHG RX REV CODE 258: Performed by: PEDIATRICS

## 2022-10-17 PROCEDURE — 85025 COMPLETE CBC W/AUTO DIFF WBC: CPT

## 2022-10-17 PROCEDURE — 700111 HCHG RX REV CODE 636 W/ 250 OVERRIDE (IP): Performed by: PEDIATRICS

## 2022-10-17 PROCEDURE — 82248 BILIRUBIN DIRECT: CPT

## 2022-10-17 PROCEDURE — 770000 HCHG ROOM/CARE - INTERMEDIATE ICU *

## 2022-10-17 PROCEDURE — 81003 URINALYSIS AUTO W/O SCOPE: CPT

## 2022-10-17 PROCEDURE — 700102 HCHG RX REV CODE 250 W/ 637 OVERRIDE(OP): Performed by: PEDIATRICS

## 2022-10-17 PROCEDURE — A9270 NON-COVERED ITEM OR SERVICE: HCPCS | Performed by: PEDIATRICS

## 2022-10-17 PROCEDURE — 99999 PR NO CHARGE: CPT | Performed by: PEDIATRICS

## 2022-10-17 PROCEDURE — A4212 NON CORING NEEDLE OR STYLET: HCPCS

## 2022-10-17 PROCEDURE — 99222 1ST HOSP IP/OBS MODERATE 55: CPT | Performed by: PEDIATRICS

## 2022-10-17 PROCEDURE — 81001 URINALYSIS AUTO W/SCOPE: CPT

## 2022-10-17 RX ORDER — SODIUM CHLORIDE 9 MG/ML
20 INJECTION, SOLUTION INTRAVENOUS PRN
Status: ACTIVE | OUTPATIENT
Start: 2022-10-17 | End: 2022-10-18

## 2022-10-17 RX ORDER — ONDANSETRON 2 MG/ML
8 INJECTION INTRAMUSCULAR; INTRAVENOUS EVERY 8 HOURS
Status: DISCONTINUED | OUTPATIENT
Start: 2022-10-17 | End: 2022-10-21 | Stop reason: HOSPADM

## 2022-10-17 RX ORDER — LIDOCAINE 40 MG/G
CREAM TOPICAL PRN
Status: DISCONTINUED | OUTPATIENT
Start: 2022-10-17 | End: 2022-10-21 | Stop reason: HOSPADM

## 2022-10-17 RX ORDER — DEXTROSE AND SODIUM CHLORIDE 5; .45 G/100ML; G/100ML
INJECTION, SOLUTION INTRAVENOUS PRN
Status: DISCONTINUED | OUTPATIENT
Start: 2022-10-17 | End: 2022-10-21 | Stop reason: HOSPADM

## 2022-10-17 RX ORDER — SODIUM CHLORIDE 9 MG/ML
500 INJECTION, SOLUTION INTRAVENOUS CONTINUOUS
Status: DISCONTINUED | OUTPATIENT
Start: 2022-10-17 | End: 2022-10-21 | Stop reason: HOSPADM

## 2022-10-17 RX ORDER — IMATINIB MESYLATE 100 MG/1
250 TABLET, FILM COATED ORAL EVERY EVENING
COMMUNITY
End: 2022-10-24

## 2022-10-17 RX ORDER — LORAZEPAM 1 MG/1
1 TABLET ORAL EVERY 6 HOURS PRN
Status: DISCONTINUED | OUTPATIENT
Start: 2022-10-17 | End: 2022-10-21 | Stop reason: HOSPADM

## 2022-10-17 RX ORDER — PROMETHAZINE HYDROCHLORIDE 25 MG/1
0.25 TABLET ORAL EVERY 6 HOURS PRN
Status: DISCONTINUED | OUTPATIENT
Start: 2022-10-17 | End: 2022-10-21 | Stop reason: HOSPADM

## 2022-10-17 RX ORDER — CHLORHEXIDINE GLUCONATE ORAL RINSE 1.2 MG/ML
15 SOLUTION DENTAL 2 TIMES DAILY
Status: DISCONTINUED | OUTPATIENT
Start: 2022-10-17 | End: 2022-10-21 | Stop reason: HOSPADM

## 2022-10-17 RX ORDER — MERCAPTOPURINE 50 MG/1
50 TABLET ORAL
Status: DISCONTINUED | OUTPATIENT
Start: 2022-10-18 | End: 2022-10-21 | Stop reason: HOSPADM

## 2022-10-17 RX ORDER — ONDANSETRON 2 MG/ML
8 INJECTION INTRAMUSCULAR; INTRAVENOUS ONCE
Status: COMPLETED | OUTPATIENT
Start: 2022-10-17 | End: 2022-10-17

## 2022-10-17 RX ORDER — IMATINIB MESYLATE 100 MG/1
200 TABLET, FILM COATED ORAL ONCE
Status: COMPLETED | OUTPATIENT
Start: 2022-10-17 | End: 2022-10-17

## 2022-10-17 RX ORDER — ONDANSETRON 2 MG/ML
8 INJECTION INTRAMUSCULAR; INTRAVENOUS EVERY 8 HOURS PRN
Status: DISCONTINUED | OUTPATIENT
Start: 2022-10-17 | End: 2022-10-21 | Stop reason: HOSPADM

## 2022-10-17 RX ADMIN — SODIUM BICARBONATE: 84 INJECTION, SOLUTION INTRAVENOUS at 20:37

## 2022-10-17 RX ADMIN — IMATINIB MESYLATE 200 MG: 100 TABLET, FILM COATED ORAL at 18:00

## 2022-10-17 RX ADMIN — ONDANSETRON 8 MG: 2 INJECTION INTRAMUSCULAR; INTRAVENOUS at 21:36

## 2022-10-17 RX ADMIN — SODIUM CHLORIDE 1496 ML: 9 INJECTION, SOLUTION INTRAVENOUS at 09:35

## 2022-10-17 RX ADMIN — SODIUM BICARBONATE: 84 INJECTION, SOLUTION INTRAVENOUS at 11:13

## 2022-10-17 RX ADMIN — 0.12% CHLORHEXIDINE GLUCONATE 15 ML: 1.2 RINSE ORAL at 21:37

## 2022-10-17 RX ADMIN — SODIUM BICARBONATE: 84 INJECTION, SOLUTION INTRAVENOUS at 16:02

## 2022-10-17 RX ADMIN — VINCRISTINE SULFATE 2 MG: 1 INJECTION, SOLUTION INTRAVENOUS at 13:17

## 2022-10-17 RX ADMIN — METHOTREXATE: 25 INJECTION, SOLUTION INTRA-ARTERIAL; INTRAMUSCULAR; INTRATHECAL; INTRAVENOUS at 14:05

## 2022-10-17 RX ADMIN — LORAZEPAM 1 MG: 1 TABLET ORAL at 18:01

## 2022-10-17 RX ADMIN — METHOTREXATE: 25 INJECTION, SOLUTION INTRA-ARTERIAL; INTRAMUSCULAR; INTRATHECAL; INTRAVENOUS at 13:31

## 2022-10-17 RX ADMIN — ONDANSETRON 8 MG: 2 INJECTION INTRAMUSCULAR; INTRAVENOUS at 13:11

## 2022-10-17 ASSESSMENT — PAIN DESCRIPTION - PAIN TYPE
TYPE: ACUTE PAIN

## 2022-10-17 ASSESSMENT — LIFESTYLE VARIABLES
ALCOHOL_USE: NO
CONSUMPTION TOTAL: NEGATIVE
HAVE PEOPLE ANNOYED YOU BY CRITICIZING YOUR DRINKING: NO
EVER HAD A DRINK FIRST THING IN THE MORNING TO STEADY YOUR NERVES TO GET RID OF A HANGOVER: NO
TOTAL SCORE: 0
EVER FELT BAD OR GUILTY ABOUT YOUR DRINKING: NO
DOES PATIENT WANT TO STOP DRINKING: NO
ON A TYPICAL DAY WHEN YOU DRINK ALCOHOL HOW MANY DRINKS DO YOU HAVE: 0
HOW MANY TIMES IN THE PAST YEAR HAVE YOU HAD 5 OR MORE DRINKS IN A DAY: 0
TOTAL SCORE: 0
HAVE YOU EVER FELT YOU SHOULD CUT DOWN ON YOUR DRINKING: NO
AVERAGE NUMBER OF DAYS PER WEEK YOU HAVE A DRINK CONTAINING ALCOHOL: 0
TOTAL SCORE: 0

## 2022-10-17 ASSESSMENT — FIBROSIS 4 INDEX
FIB4 SCORE: 1.61
FIB4 SCORE: .9

## 2022-10-17 NOTE — PROGRESS NOTES
Post NS bolus- urine meets criteria.  Per MD singer to begin chemo after 2 hours pre hydration fluids completed.

## 2022-10-17 NOTE — H&P
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, in MRD Remission:              - 2-3 weeks of symptoms              - Presenting WBC > 440 k/uL, hyperleukocytosis              - Start of Hydroxyurea (1500 mg PO Q8) 2320 on 5/27/2022  - discontinued after only 55 hours              - No steroid pretreatment              - 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              - Testicular status NEGATIVE                   - Flow cytometry of both 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, FISH positive for BCR-ABL1 translocation              - Enrolled on [facility] [id number]              - Initially enrolled on [facility] [id number] - but 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                            - Enrolled on [facility] [id number] and began study 6/13/2022              - Started imatinib therapy 6/3/2022 (split dosing of imatinib 400 mg PO QAM and 200 mg PO QPM) - continued at Day 15  of Induction 1A with imatinib 600 mg PO daily - remains 100% compliant with imatinib              - End of Induction IA and IB MRD negative                         - WBC 2.6, Hgb 10.9, platelets 80  - ANC 1710,   - Creatinine 0.58 (baseline)  - AST 24, ALT 49, bilirubin 0.4                  - No acute dose-limiting toxicities.  ANC 1710, platelets 80 and in meeting with criteria to proceed with Day 15 high-dose methotrexate.  No mucositis.  No Bactrim in the past 72 hours, no NSAIDs in the past 72 hours.                 - Will admit for Day 15 of Interim Maintenance                [id number], AR Arm B, Interim Maintenance, Day 15: (4 DAYS DELAYED DUE TO THROMBOCYTOPENIA)                 ** Vincristine 2 mg IV x 1 dose on Day 15 (TODAY)              ** Methotrexate 935 IV over 30 minutes on Day 15 (TODAY)              ** Methotrexate 8415 mg IV Over 24.5 hours starting on Day 15 (TODAY)              ** Leucovorin 28 mg IV Q6H starting at Hour 42               ** Mercaptopurine 50 mg = 1 tab PO  x 6 days of the week, 25 mg = 0.5 tabs x 1 day/week) (WILL RESUME 6 MP TODAY, WILL NOT MAKE UP FOR 4 HELD DOSES AS PER PROTOCOL)              ** Took Imatinib 400 mg PO this AM - will administer an addition 200 mg dose this evening (no splitting tabs while in hospital)  ** Starting tomorrow will administer Imatinib 600 mg PO QAM until discharged     - IV fluids per protocol, prehydration x 2 hours and until urine specific gravity < 1.010 and pH > 7   - D5 1/4 NS + 30 mEq sodium bicarbonate at 230 ml/hr (=125 ml/m2/hr)   - Will monitor MTX with intense monitoring protocol however will not act on early MTX values as patient is on study (2hr, 8hr) - obtain MTX levels at 24hr, (36 hr if indicated) 42hr, 48hr then daily until MTX <0.1 uM                2) Chemotherapy Related Pancytopenia:  - WBC 2.6, Hgb 10.9, platelets 80  - ANC 1710,               - No transfusion indicated     3) Sixth Cranial Nerve Palsy  (IMPROVED/RESOLVED):             - Followed by Dr. Carranza             - Improvement/Resolution of palsy, still treating some astigmatism      4) At Risk of Opportunistic Lung Infection:             - Holding Bactrim this past weekend, can resume 72 hours after clearance of methotrexate     5) Anxiety (IMPROVED GREATLY):     6) Social:             - Continue with support             - Continue school as tolerated     7) Access:               - R Port-A-Cath in place      8) Research Participant:              [facility] - Source Data         Diagnosis: Ph+ Precursor B-Cell Acute Lymphoblastic Leukemia     Disease Status: EOI1A MRD NEGATIVE, EOI1B RD NEGATIVE, CNS3c, testicular negative, HSV1 IgG POSITIVE, CMV IgG NEGATIVE, VARICELLA IgG POSITIVE     Active Studies: [id number], [id number]                                                                                                      Inactive Studies: [id number]                                                                                                                                                Optional Studies: None             Protocol: International Phase 3 trial in [place] 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      Treatment Plan: [id number](OS), AR-Arm B, Interim Maintenance, Day 15     Height: 1.680 m      Weight: 74.8 kg       BSA: 1.87 m²   (Start of Interim Maintenance 9/29/2022)                                                                                                                                           Performance Status: Karnofsky 100, ECOG  0     Treatment Plan Medications:  (verified 100% compliance)     Currently taking imatinib 400 mg PO QAM and 250 mg QPM     Was HOLDING 6-MP since 10/13/2022      Evaluations / Study Labs (10/17/2022):    WBC 2.6, Hgb 10.9, platelets 80  ANC 1710,   Creatinine 0.58  (baseline)  AST 24, ALT 49  Total Bilirubin 0.4     Therapy Given (10/17/2022):     Vincristine 2 mg IV  Methotrexate 9350 mg IV (5 g/m²)   Mercaptopurine 1 tablet PO daily x 6 days and 0.5 tablets x 1 day (RESTARTED)  Imatinib 600 mg PO daily (CONTINUED)         Disposition: Admit to inpatient for High-dose Methotrexate, discharge pending clearance of methotrexate     Pepe Faye MD  Pediatric Hematology / Oncology  [facility].  [phone]  [facility]. [phone]

## 2022-10-17 NOTE — LETTER
Physician Notification of Admission      To: Margarito Arvizu M.D.    [address] [facility] [address]  [place] [place] [address]    From: Pepe Faye M.D.    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 We strive to improve patient care by facilitating active communication with our medical colleagues from around the region.    To speak with a member of the patients care team, please contact the [facility] Pediatric department at [phone].   Thank you for allowing us to participate in the care of your patient.

## 2022-10-17 NOTE — LETTER
Physician Notification of Discharge    Patient name: Tomas Jean-Baptiste     : 2000     MRN: [id number]    Discharge Date/Time: No discharge date for patient encounter.    Discharge Disposition: Discharged to home/self care ()    Discharge DX: There are no discharge diagnoses documented for the most recent discharge.    Discharge Meds:      Medication List      CHANGE how you take these medications      Instructions   mercaptopurine 50 MG Tabs  What changed:   · how much to take  · how to take this  · when to take this  · additional instructions  Commonly known as: PURINETHOL   Doctor's comments: Meds to Beds please.  Take 1 tablet by mouth Monday - Saturday.  Take 0.5 tablets by mouth on .        CONTINUE taking these medications      Instructions   chlorhexidine 0.12 % Soln  Commonly known as: PERIDEX   Swish and spit 15 mL by mouth 2 times a day.  Dose: 15 mL     * imatinib 100 MG tablet  Commonly known as: Gleevec   Take 250 mg by mouth every evening.  Dose: 250 mg     * imatinib 400 MG tablet  Commonly known as: Gleevec   Take 400 mg by mouth every morning.  Dose: 400 mg     ondansetron 8 MG Tbdp  Commonly known as: Zofran ODT   Dissovle 1 Tablet by mouth every 8 hours as needed for Nausea.  Dose: 8 mg     sulfamethoxazole-trimethoprim 400-80 MG Tabs  Commonly known as: BACTRIM   Take 2 tablets by mouth twice daily every Saturday and      therapeutic multivitamin-minerals Tabs   Take 1 Tab by mouth every day.  Dose: 1 Tablet     vitamin D3 1000 Unit (25 mcg) Tabs  Commonly known as: cholecalciferol   Take 1,000 Units by mouth every day.  Dose: 1,000 Units         * This list has 2 medication(s) that are the same as other medications prescribed for you. Read the directions carefully, and ask your doctor or other care provider to review them with you.              Attending Provider: Pepe Faye M.D.    [facility]  [facility] Pediatrics Department    PCP: Margarito Arvizu M.D.    To speak with a member of the patients care team, please contact the [facility] Pediatric department -at [phone].   Thank you for allowing us to participate in the care of your patient.

## 2022-10-17 NOTE — PROGRESS NOTES
Pt to [facility] for lab draw and  Visit and possible admission.  Afebrile.  VSS.  Awake and alert in no acute distress.  Port accessed with 22 g 3/4in  and labs drawn from the port without difficulty / with 1 attempt.   Pt tolerated well.  Visit with Dr. Faye completed. OK for admission.  Pt escorted to peds floor with RN

## 2022-10-17 NOTE — PROGRESS NOTES
Pt admitted to peds for scheduled chemotherapy administration. Port accessed in CIS- scrubbed the hub - blood return noted- NS bolus initiated.

## 2022-10-17 NOTE — PROGRESS NOTES
Chemotherapy dosage calculated independently by Elias and compared to road map for protocol HD MTX Interim Maintenance SR Arm B as per [id number].  Calculations within 10% of written order.  Lab results reviewed.      Premedications and chemo given as ordered, see MAR.  VCR given- tolerated. HD MTX initiated to be infused over 24 hours.

## 2022-10-17 NOTE — PROGRESS NOTES
"Pharmacy Chemotherapy Calculations Note:    Dx: B-ALL, PH+    Cycle: Interim Maintenance Day 15 (delayed from 10/13 for low plts)  On Study - [facility] ID number: [id number]  Previous treatment: IM Day 1 on 9/29/22    Protocol: HD MTX Interim Maintenance SR Arm B (Investigational COG Arm) as per [id number]          /76   Pulse 81   Temp 36.9 °C (98.4 °F) (Temporal)   Resp 20   Ht 1.675 m (5' 5.95\")   Wt 75.8 kg (167 lb 1.7 oz)   SpO2 95%   BMI 27.02 kg/m²  Body surface area is 1.88 meters squared.  IM Dosing Values:   Ht 168 cm Wt 74.8 kg BSA 1.87 m²     Labs from 10/17/22 reviewed - all within treatment parameters.      Vincristine (Oncovin) 1.5 mg/m² x 1.88 m² = 2.82 mg   Max 2 mg, okay to treat with final dose = 2 mg IV    Do not start until urine SpG < 1.010 and pH > 7  HD Methotrexate 5000 mg/m² given as:     Methotrexate 500 mg/m² x 1.88 m² = 940 mg   <10% difference, okay to treat with final dose = 935 mg IV over 30 min     Methotrexate 4500 mg/m² x 1.88 m² = 8460 mg   <10% difference, okay to treat with final dose = 8415 mg IV over 23.5 hrs    Leucovorin 15 mg/m² x 1.88 m² = 28.2 mg   <10% difference, okay to treat with final dose = 28.1 mg IV starting at hour 42 after start of MTX infusion      Judy Bryant, PharmD, BCOP  "

## 2022-10-17 NOTE — PROGRESS NOTES
"Pharmacy Chemotherapy Calculations    Patient Name: Tomas Roy \"JULY\" Estiven     Diagnosis: Ph+ Precursor B-Cell ALL    Protocol: [id number] (On Study, Arm B) - COG ID number: [id number]      Allergies: Amoxicillin       /76   Pulse 81   Temp 36.9 °C (98.4 °F) (Temporal)   Resp 20   Ht 1.675 m (5' 5.95\")   Wt 75.8 kg (167 lb 1.7 oz)   SpO2 95%   BMI 27.02 kg/m²  Body surface area is 1.88 meters squared.     Labs 10/17/22 are within treatment plan parameters.      Drug Order   (Drug name, dose, route, IV Fluid & volume, frequency, number of doses) Interim Maintenance, Day 15  -delayed for thrombocytopenia  Previous treatment: IM D 1 on 9/29/22   Medication = High Dose Methotrexate PF  Base Dose = 5,000 mg/m2 = 500 mg/m2 followed by 4,500 mg/m2   Calc Dose 1: 500 mg/m2 x 1.88 m2 = 940mg   Calc Dose 2: 4,500 mg/m2 x 1.88 m2 = 8460mg  Final Dose 1 = 935mg   Final Dose 2 = 8415mg  Route = IV  Fluid & Volume 1 = D5W 50mL over 30min  Fluid & Volume 2 = D5W 500mL over 23.5hrs   Do not start until urine SpG < 1.010 and pH > 7      <10% difference, OK to treat with final dose     Medication = Vincristine (ONCOVIN)  Base Dose = 1.5 mg/m2   Calc Dose: 1.5 mg/m2 x 1.88m2 = >2 mg  Final Dose = 2 mg  Route = IV  Fluid & Volume = NS 25 mL   Admin Duration = Over 5 - 10 minutes          OK to treat with MAX dose     Medication = leucovorin  Base Dose = 15mg/m2   Calc Dose: 1.5 mg/m2 x 1.88m2 = 28.2mg  Final Dose = 28.1mg  Route = IV  Fluid & Volume = D5W  25 mL   Admin Duration = Over 15min   Q6hrs, Start 42 hrs after treatment start time    <10% difference, ok to treat with final dose     By my signature below, I confirm this process was performed independently with the BSA and all final chemotherapy dosing calculations congruent. I have reviewed the above chemotherapy order and that my calculation of the final dose and BSA (when applicable) corroborate those calculations of the  pharmacist. Discrepancies of " 10% or greater in the written dose have been addressed and documented within the EPIC Progress notes.    Eloy RalphD.

## 2022-10-18 LAB
ANION GAP SERPL CALC-SCNC: 10 MMOL/L (ref 7–16)
APPEARANCE UR: CLEAR
BACTERIA #/AREA URNS HPF: NEGATIVE /HPF
BACTERIA #/AREA URNS HPF: NEGATIVE /HPF
BILIRUB UR QL STRIP.AUTO: NEGATIVE
BUN SERPL-MCNC: 5 MG/DL (ref 8–22)
CALCIUM SERPL-MCNC: 8 MG/DL (ref 8.5–10.5)
CHLORIDE SERPL-SCNC: 106 MMOL/L (ref 96–112)
CO2 SERPL-SCNC: 25 MMOL/L (ref 20–33)
COLOR UR: ABNORMAL
COLOR UR: ABNORMAL
COLOR UR: YELLOW
CREAT SERPL-MCNC: 0.62 MG/DL (ref 0.5–1.4)
EPI CELLS #/AREA URNS HPF: NEGATIVE /HPF
EPI CELLS #/AREA URNS HPF: NEGATIVE /HPF
GFR SERPLBLD CREATININE-BSD FMLA CKD-EPI: 139 ML/MIN/1.73 M 2
GLUCOSE SERPL-MCNC: 116 MG/DL (ref 65–99)
GLUCOSE UR STRIP.AUTO-MCNC: NEGATIVE MG/DL
HYALINE CASTS #/AREA URNS LPF: ABNORMAL /LPF
HYALINE CASTS #/AREA URNS LPF: ABNORMAL /LPF
KETONES UR STRIP.AUTO-MCNC: NEGATIVE MG/DL
LEUKOCYTE ESTERASE UR QL STRIP.AUTO: NEGATIVE
MICRO URNS: ABNORMAL
MICRO URNS: ABNORMAL
MICRO URNS: NORMAL
MTX SERPL-SCNC: 69 UMOL/L
NITRITE UR QL STRIP.AUTO: NEGATIVE
PH UR STRIP.AUTO: 6 [PH] (ref 5–8)
PH UR STRIP.AUTO: 7.5 [PH] (ref 5–8)
PH UR STRIP.AUTO: 8 [PH] (ref 5–8)
POTASSIUM SERPL-SCNC: 3.4 MMOL/L (ref 3.6–5.5)
PROT UR QL STRIP: 30 MG/DL
PROT UR QL STRIP: 30 MG/DL
PROT UR QL STRIP: NEGATIVE MG/DL
RBC # URNS HPF: ABNORMAL /HPF
RBC # URNS HPF: ABNORMAL /HPF
RBC UR QL AUTO: NEGATIVE
SODIUM SERPL-SCNC: 141 MMOL/L (ref 135–145)
SP GR UR STRIP.AUTO: 1
SP GR UR STRIP.AUTO: 1.01
UROBILINOGEN UR STRIP.AUTO-MCNC: 0.2 MG/DL
WBC #/AREA URNS HPF: ABNORMAL /HPF
WBC #/AREA URNS HPF: ABNORMAL /HPF

## 2022-10-18 PROCEDURE — A9270 NON-COVERED ITEM OR SERVICE: HCPCS | Performed by: PEDIATRICS

## 2022-10-18 PROCEDURE — 700105 HCHG RX REV CODE 258: Performed by: PEDIATRICS

## 2022-10-18 PROCEDURE — 80299 QUANTITATIVE ASSAY DRUG: CPT

## 2022-10-18 PROCEDURE — 700111 HCHG RX REV CODE 636 W/ 250 OVERRIDE (IP): Performed by: PEDIATRICS

## 2022-10-18 PROCEDURE — 700102 HCHG RX REV CODE 250 W/ 637 OVERRIDE(OP): Performed by: PEDIATRICS

## 2022-10-18 PROCEDURE — 81001 URINALYSIS AUTO W/SCOPE: CPT

## 2022-10-18 PROCEDURE — 81003 URINALYSIS AUTO W/O SCOPE: CPT | Mod: 91

## 2022-10-18 PROCEDURE — 80048 BASIC METABOLIC PNL TOTAL CA: CPT

## 2022-10-18 PROCEDURE — 99232 SBSQ HOSP IP/OBS MODERATE 35: CPT | Performed by: PEDIATRICS

## 2022-10-18 PROCEDURE — 770000 HCHG ROOM/CARE - INTERMEDIATE ICU *

## 2022-10-18 RX ADMIN — SODIUM BICARBONATE: 84 INJECTION, SOLUTION INTRAVENOUS at 23:42

## 2022-10-18 RX ADMIN — ONDANSETRON 8 MG: 2 INJECTION INTRAMUSCULAR; INTRAVENOUS at 22:22

## 2022-10-18 RX ADMIN — ONDANSETRON 8 MG: 2 INJECTION INTRAMUSCULAR; INTRAVENOUS at 14:03

## 2022-10-18 RX ADMIN — SODIUM BICARBONATE: 84 INJECTION, SOLUTION INTRAVENOUS at 00:55

## 2022-10-18 RX ADMIN — SODIUM BICARBONATE: 84 INJECTION, SOLUTION INTRAVENOUS at 19:29

## 2022-10-18 RX ADMIN — 0.12% CHLORHEXIDINE GLUCONATE 15 ML: 1.2 RINSE ORAL at 09:17

## 2022-10-18 RX ADMIN — 0.12% CHLORHEXIDINE GLUCONATE 15 ML: 1.2 RINSE ORAL at 21:57

## 2022-10-18 RX ADMIN — SODIUM BICARBONATE 46.8 MEQ: 84 INJECTION INTRAVENOUS at 03:10

## 2022-10-18 RX ADMIN — SODIUM BICARBONATE: 84 INJECTION, SOLUTION INTRAVENOUS at 05:05

## 2022-10-18 RX ADMIN — MERCAPTOPURINE 50 MG: 50 TABLET ORAL at 06:06

## 2022-10-18 RX ADMIN — IMATINIB MESYLATE 600 MG: 400 TABLET, FILM COATED ORAL at 06:06

## 2022-10-18 RX ADMIN — ONDANSETRON 8 MG: 2 INJECTION INTRAMUSCULAR; INTRAVENOUS at 06:06

## 2022-10-18 RX ADMIN — SODIUM BICARBONATE: 84 INJECTION, SOLUTION INTRAVENOUS at 09:51

## 2022-10-18 RX ADMIN — SODIUM BICARBONATE: 84 INJECTION, SOLUTION INTRAVENOUS at 14:30

## 2022-10-18 ASSESSMENT — FIBROSIS 4 INDEX: FIB4 SCORE: .9

## 2022-10-18 ASSESSMENT — PAIN DESCRIPTION - PAIN TYPE
TYPE: ACUTE PAIN
TYPE: ACUTE PAIN

## 2022-10-18 NOTE — PROGRESS NOTES
Pt demonstrates ability to turn self in bed without assistance of staff. Patient understands importance in prevention of skin breakdown, ulcers, and potential infection. Hourly rounding in effect. RN skin check complete.   Devices in place include: Portx1.  Skin assessed under devices: Yes.  Confirmed HAPI identified on the following date: n/a   Location of HAPI: n/a.  Wound Care RN following: No.  The following interventions are in place: devices removed when not in use, dressings assessed, pillows in place for support and positioning, reminder of frequent repositioning.

## 2022-10-18 NOTE — CARE PLAN
The patient is Stable - Low risk of patient condition declining or worsening    Shift Goals  Clinical Goals: afebrile, nausea control, void adeqautely, tolerate chemo  Patient Goals: rest, no nausea  Family Goals: no family present    Progress made toward(s) clinical / shift goals:  Yes      Problem: Fluid Volume  Goal: Fluid volume balance will be maintained  Outcome: Progressing  Note: Remains on IVF     Problem: Urinary Elimination  Goal: Establish and maintain regular urinary output  Outcome: Progressing  Note: Voided adequately      Problem: Bowel Elimination  Goal: Establish and maintain regular bowel function  Outcome: Progressing  Note: Had BM this shift        Patient is not progressing towards the following goals: n/a

## 2022-10-18 NOTE — PROGRESS NOTES
"Pediatric Hematology/Oncology  Daily Progress Note      Patient Name:  Tomas Jean-Baptiste  : 2000  MRN: [id number]    Location of Service:  [facility]  Date of Service: 10/18/2022  Time: 12:41 PM    Hospital Day: 2    Protocol / Treatment Plan: Frontier Chromosome Precursor B-Cell Acute Lymphoblastic Leukemia, ON STUDY [id number], Interim Maintenance, Day 16    SUBJECTIVE:     Tomas Roy experienced moderate nausea overnight with vomiting at least x1.  Reports that otherwise tolerated chemotherapy well.  No fevers, no signs and symptoms of acute illness.  Tolerating fluids as well.  No complaints of any headaches, changes in vision, neurologic status changes.  No easy bruising or bleeding.  No complaints of any skin changes or rashes.  No aches or pains. Tomas Roy continued since take his imatinib inpatient as well as starting back up with his 6-MP.  No other concerns or complaints at this time.    Review of Systems:     Constitutional: Afebrile, f overall feeling well however did complain of moderate nausea as well as vomiting.  Decreased appetite due to nausea.  HENT: Negative.    Eyes: Negative for visual changes.  Respiratory: Negative for shortness of breath.  Cardiovascular: Negative.  Gastrointestinal: Moderate nausea as above.  Genitourinary: Negative.  Musculoskeletal: Negative for arm pain or leg pain.    Skin: Negative for rash or skin infection.  Neurological: Negative for numbness, tingling, sensory changes, weakness or headaches.    Endo/Heme/Allergies: No bruising/bleeding easily.    Psychiatric/Behavioral: Good mood.     OBJECTIVE:     Max Temp: Temp (24hrs), Av.4 °C (97.5 °F), Min:36.1 °C (96.9 °F), Max:36.9 °C (98.4 °F)    Vitals: /79   Pulse 83   Temp 36.6 °C (97.8 °F) (Temporal)   Resp 18   Ht 1.675 m (5' 5.95\")   Wt 75.8 kg (167 lb 1.7 oz)   SpO2 95%   BMI 27.02 kg/m²     I/O:   Intake/Output Summary (Last 24 hours) " at 10/18/2022 1241  Last data filed at 10/18/2022 0606  Gross per 24 hour   Intake 4731.49 ml   Output 3200 ml   Net 1531.49 ml     Labs:    Most Recent Hematology Labs:    Lab Results   Component Value Date/Time    WBC 2.6 (L) 10/17/2022 0800    HEMOGLOBIN 10.9 (L) 10/17/2022 0800    .8 (H) 10/17/2022 0800    PLATELETCT 80 (L) 10/17/2022 0800    NEUTS 1.71 (L) 10/17/2022 0800       Most Recent Metabolic Panel:    Lab Results   Component Value Date/Time    POTASSIUM 3.8 10/17/2022 0800    CHLORIDE 106 10/17/2022 0800    CO2 23 10/17/2022 0800    GLUCOSE 94 10/17/2022 0800    BUN 11 10/17/2022 0800    CREATININE 0.58 10/17/2022 0800    CALCIUM 8.8 10/17/2022 0800    ANION 12.0 10/17/2022 0800     No BMP obtained this morning, will obtain with 24 hour methotrexate level    Physical Exam:    Constitutional: Overall well-appearing however appears tired.  HENT: Normocephalic and atraumatic. Alopecia.  No rhinorrhea. Oropharynx is clear and moist.   Eyes: Conjunctivae are normal. EOMI.   Neck: Normal range of motion of neck, no adenopathy.    Cardiovascular: Normal rate, regular rhythm.  No murmur. DP/radial pulses 2+, cap refill < 2 sec  Pulmonary/Chest: Effort normal. No respiratory distress. Symmetric expansion.  No crackles or wheezes.  Abdomen: Soft. Bowel sounds are normal. No distension and no mass. There is no hepatosplenomegaly.    Genitourinary:  Deferred.  Musculoskeletal: Normal range of motion of lower and upper extremities bilaterally.   Neurological: Alert and oriented to person and place. Exhibits normal muscle tone bilaterally in upper and lower extremities. Gait normal. Coordination normal.    Skin: Skin is warm, dry and pink.  No rash or evidence of skin infection.   Psychiatric: Mood stable    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 and whose End of Induction IB MRD was both  negative by flow cytometry and PCR who presents for scheduled chemotherapy readiness, Interim Maintenance, Day 15 (4 days delayed due to thrombocytopenia)     1) Ph+ Precursor B-Cell Acute Lymphoblastic Leukemia, in MRD Remission:              - 2-3 weeks of symptoms              - Presenting WBC > 440 k/uL, hyperleukocytosis              - Start of Hydroxyurea (1500 mg PO Q8) 2320 on 5/27/2022  - discontinued after only 55 hours              - No steroid pretreatment              - 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              - Testicular status NEGATIVE                   - Flow cytometry of both 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, FISH positive for BCR-ABL1 translocation              - Enrolled on [facility] [id number]              - Initially enrolled on [facility] [id number] - but 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                            - Enrolled on [facility] [id number] and began study 6/13/2022              - Started imatinib therapy 6/3/2022 (split dosing of imatinib 400 mg PO QAM and 200 mg PO QPM) - continued at Day 15 of Induction 1A with imatinib 600 mg PO daily - remains 100% compliant with imatinib              - End of Induction IA and IB MRD negative                         - WBC 2.6, Hgb 10.9, platelets 80 on admission  - ANC 1710,  on admission  - Creatinine 0.58 (baseline) on admission  - AST 24, ALT 49, bilirubin 0.4 on admission     [id number], AR Arm B, Interim Maintenance, Day 16:                  ** Vincristine 2 mg IV x 1 dose on Day 15 (COMPLETE)              ** Methotrexate 935 IV over 30 minutes on Day 15 (COMPLETE)              ** Methotrexate 8415 mg IV Over 24.5 hours starting on Day 15 (COMPLETING)              ** Leucovorin 28 mg IV Q6H starting at Hour 42               ** Mercaptopurine 50 mg = 1 tab PO  x 6 days of the week, 25 mg = 0.5 tabs x 1 day/week)               ** Imatinib 600 mg PO AM while hospitalized and unable to  split tablet      - D5 1/4 NS + 30 mEq sodium bicarbonate at 230 ml/hr (=125 ml/m2/hr)   - Plan to obtain 24-hour methotrexate at 1330 today adjustments to fluids if indicated    2) Chemotherapy Related Pancytopenia:  - WBC 2.6, Hgb 10.9, platelets 80 on admission  - ANC 1710,  on admission              - No transfusion indicated on admission     3) Sixth Cranial Nerve Palsy (IMPROVED/RESOLVED):             - Followed by Dr. Carranza             - Improvement/Resolution of palsy, still treating some astigmatism      4) At Risk of Opportunistic Lung Infection:             - Holding Bactrim this past weekend, can resume 72 hours after clearance of methotrexate     5) Anxiety (IMPROVED GREATLY):     6) Social:             - Continue with support             - Continue school as tolerated     7) Access:               - R Port-A-Cath in place      8) Research Participant:      9) CNS3 Disease:   - Will received 18 Gy over 10 fractions in the first 4 weeks of Maintenance    Disposition: Inpatient for high-dose methotrexate.  Discharge pending clearance of methotrexate.    Pepe Faye MD  Pediatric Hematology / Oncology  [facility]  Cell.  [phone]  [facility]. [phone]

## 2022-10-18 NOTE — CARE PLAN
The patient is Stable - Low risk of patient condition declining or worsening    Shift Goals  Clinical Goals: urine pH and specific gravity within target range  Patient Goals: decreased nausea  Family Goals: ELLA    Progress made toward(s) clinical / shift goals:    Urine pH and specific gravity within the target range during entire shift. Patient with decreased nausea in comparison to previous shift - pt denies need for ativan at this time.     Problem: Knowledge Deficit - Standard  Goal: Patient and family/care givers will demonstrate understanding of plan of care, disease process/condition, diagnostic tests and medications  Outcome: Progressing  Note: Patient updated on plan of care including medications and discharge requirements.      Problem: Fluid Volume  Goal: Fluid volume balance will be maintained  Outcome: Progressing  Note: Patient receiving IV fluids per MAR with adequate urine output.        Patient is not progressing towards the following goals: NA

## 2022-10-18 NOTE — PROGRESS NOTES
Pt demonstrates ability to turn self in bed without assistance of staff. Patient understands importance in prevention of skin breakdown, ulcers, and potential infection. Hourly rounding in effect. RN skin check complete.   Devices in place include: right chest port.  Skin assessed under devices: Yes.  Confirmed HAPI identified on the following date: NA   Location of HAPI: NA.  Wound Care RN following: No.  The following interventions are in place: Dressing changes as needed and pillows in place for support/positioning.

## 2022-10-18 NOTE — DISCHARGE PLANNING
Case Management Discharge Planning      Medical records reviewed by this RN Case Manager. Pt admitted inpatient to acute pediatrics with [place] chromosome positive acute lymphoblastic leukemia. Patient lives with mother and siblings in [place]. His insurance is through [facility] (primary) and Medicaid HMO/[employer] Medicaid (secondary). JULY's PCP is listed as Margarito Arvizu MD. Pt to be discharged home when medically cleared. No CM needs noted at this time. Will continue to follow for discharge needs.

## 2022-10-19 LAB
ANION GAP SERPL CALC-SCNC: 10 MMOL/L (ref 7–16)
APPEARANCE UR: CLEAR
BILIRUB UR QL STRIP.AUTO: NEGATIVE
BUN SERPL-MCNC: 2 MG/DL (ref 8–22)
CALCIUM SERPL-MCNC: 8.3 MG/DL (ref 8.5–10.5)
CHLORIDE SERPL-SCNC: 105 MMOL/L (ref 96–112)
CO2 SERPL-SCNC: 25 MMOL/L (ref 20–33)
COLOR UR: YELLOW
CREAT SERPL-MCNC: 0.64 MG/DL (ref 0.5–1.4)
GFR SERPLBLD CREATININE-BSD FMLA CKD-EPI: 138 ML/MIN/1.73 M 2
GLUCOSE SERPL-MCNC: 109 MG/DL (ref 65–99)
GLUCOSE UR STRIP.AUTO-MCNC: NEGATIVE MG/DL
KETONES UR STRIP.AUTO-MCNC: NEGATIVE MG/DL
LEUKOCYTE ESTERASE UR QL STRIP.AUTO: NEGATIVE
MICRO URNS: NORMAL
MTX SERPL-SCNC: 0.48 UMOL/L
MTX SERPL-SCNC: 0.73 UMOL/L
NITRITE UR QL STRIP.AUTO: NEGATIVE
PH UR STRIP.AUTO: 7.5 [PH] (ref 5–8)
PH UR STRIP.AUTO: 8 [PH] (ref 5–8)
POTASSIUM SERPL-SCNC: 3.3 MMOL/L (ref 3.6–5.5)
PROT UR QL STRIP: NEGATIVE MG/DL
RBC UR QL AUTO: NEGATIVE
SODIUM SERPL-SCNC: 140 MMOL/L (ref 135–145)
SP GR UR STRIP.AUTO: 1
SP GR UR STRIP.AUTO: 1
SP GR UR STRIP.AUTO: 1.01
SP GR UR STRIP.AUTO: 1.01
UROBILINOGEN UR STRIP.AUTO-MCNC: 0.2 MG/DL
UROBILINOGEN UR STRIP.AUTO-MCNC: 0.2 MG/DL
UROBILINOGEN UR STRIP.AUTO-MCNC: 1 MG/DL
UROBILINOGEN UR STRIP.AUTO-MCNC: 1 MG/DL

## 2022-10-19 PROCEDURE — 700105 HCHG RX REV CODE 258: Performed by: PEDIATRICS

## 2022-10-19 PROCEDURE — 80048 BASIC METABOLIC PNL TOTAL CA: CPT

## 2022-10-19 PROCEDURE — 770000 HCHG ROOM/CARE - INTERMEDIATE ICU *

## 2022-10-19 PROCEDURE — 99232 SBSQ HOSP IP/OBS MODERATE 35: CPT | Performed by: PEDIATRICS

## 2022-10-19 PROCEDURE — A9270 NON-COVERED ITEM OR SERVICE: HCPCS | Performed by: PEDIATRICS

## 2022-10-19 PROCEDURE — 700102 HCHG RX REV CODE 250 W/ 637 OVERRIDE(OP): Performed by: PEDIATRICS

## 2022-10-19 PROCEDURE — 81003 URINALYSIS AUTO W/O SCOPE: CPT | Mod: 91

## 2022-10-19 PROCEDURE — 80299 QUANTITATIVE ASSAY DRUG: CPT

## 2022-10-19 PROCEDURE — 700111 HCHG RX REV CODE 636 W/ 250 OVERRIDE (IP): Performed by: PEDIATRICS

## 2022-10-19 RX ADMIN — IMATINIB MESYLATE 600 MG: 400 TABLET, FILM COATED ORAL at 06:37

## 2022-10-19 RX ADMIN — ONDANSETRON 8 MG: 2 INJECTION INTRAMUSCULAR; INTRAVENOUS at 13:45

## 2022-10-19 RX ADMIN — LEUCOVORIN CALCIUM 28.1 MG: 500 INJECTION, POWDER, LYOPHILIZED, FOR SOLUTION INTRAMUSCULAR; INTRAVENOUS at 07:35

## 2022-10-19 RX ADMIN — 0.12% CHLORHEXIDINE GLUCONATE 15 ML: 1.2 RINSE ORAL at 08:59

## 2022-10-19 RX ADMIN — ONDANSETRON 8 MG: 2 INJECTION INTRAMUSCULAR; INTRAVENOUS at 23:09

## 2022-10-19 RX ADMIN — 0.12% CHLORHEXIDINE GLUCONATE 15 ML: 1.2 RINSE ORAL at 20:03

## 2022-10-19 RX ADMIN — SODIUM BICARBONATE: 84 INJECTION, SOLUTION INTRAVENOUS at 13:45

## 2022-10-19 RX ADMIN — SODIUM BICARBONATE: 84 INJECTION, SOLUTION INTRAVENOUS at 04:05

## 2022-10-19 RX ADMIN — ONDANSETRON 8 MG: 2 INJECTION INTRAMUSCULAR; INTRAVENOUS at 06:37

## 2022-10-19 RX ADMIN — SODIUM BICARBONATE: 84 INJECTION, SOLUTION INTRAVENOUS at 18:10

## 2022-10-19 RX ADMIN — SODIUM BICARBONATE: 84 INJECTION, SOLUTION INTRAVENOUS at 08:59

## 2022-10-19 RX ADMIN — LEUCOVORIN CALCIUM 28.1 MG: 500 INJECTION, POWDER, LYOPHILIZED, FOR SOLUTION INTRAMUSCULAR; INTRAVENOUS at 13:30

## 2022-10-19 RX ADMIN — MERCAPTOPURINE 50 MG: 50 TABLET ORAL at 06:55

## 2022-10-19 RX ADMIN — SODIUM BICARBONATE: 84 INJECTION, SOLUTION INTRAVENOUS at 23:09

## 2022-10-19 RX ADMIN — LEUCOVORIN CALCIUM 28.1 MG: 500 INJECTION, POWDER, LYOPHILIZED, FOR SOLUTION INTRAMUSCULAR; INTRAVENOUS at 20:03

## 2022-10-19 ASSESSMENT — PAIN DESCRIPTION - PAIN TYPE
TYPE: ACUTE PAIN

## 2022-10-19 NOTE — PROGRESS NOTES
Repeat Hospitalization. Emotional support provided. Pt is a student at [employer], taking 2 classes this semester. Pt said it's going well, considering.  Denied any needs at this time. Talked to for 10-15 minutes. Will continue to provide support and follow.

## 2022-10-19 NOTE — PROGRESS NOTES
"Pediatric Hematology/Oncology  Daily Progress Note      Patient Name:  Tomas Jean-Baptiste  : 2000  MRN: [id number]    Location of Service:  [facility]  Date of Service: 10/19/2022  Time: 8:20 AM    Hospital Day: 3    Protocol / Treatment Plan:[place] Chromosome Precursor B-Cell Acute Lymphoblastic Leukemia, ON STUDY [id number], Interim Maintenance, Day 17    SUBJECTIVE:     No acute events overnight. Tomas Roy remained afebrile with a T-max 37.1 °C.  Significantly improved nausea overnight without any vomiting.  Increased appetite and oral intake.  No complaints of any headaches, changes in vision or neurologic status changes. Tomas Roy  denies any mouth sores or sore throat.  No complaints of any significant rashes or skin changes.  No complaints of any aches or pains.   Tomas Roy continues to take his oral medicines as prescribed.  No other concerns or complaints at this time.    Review of Systems:     Constitutional: Afebrile, feeling much better than yesterday.  Improved appetite.  HENT: Negative for auditory changes or ear pain, no nosebleeds, no sore throat.  No mouth sores.  Eyes: Negative for visual changes.  Respiratory: Negative for shortness of breath.  EOMI.  Nonicteric.  Cardiovascular: Negative.  Gastrointestinal: Negative for nausea, vomiting, abdominal pain, diarrhea, constipation.  Genitourinary: Negative.  Musculoskeletal: Negative.  Skin: Negative for rash or skin infection.  Neurological: Negative for numbness, tingling, sensory changes, weakness or headaches.    Endo/Heme/Allergies: No bruising/bleeding easily.    Psychiatric/Behavioral: Good mood.     OBJECTIVE:     Max Temp: Temp (24hrs), Av.7 °C (98.1 °F), Min:36.3 °C (97.3 °F), Max:37.1 °C (98.7 °F)    Vitals: /58   Pulse 66   Temp 36.9 °C (98.4 °F) (Temporal)   Resp 16   Ht 1.675 m (5' 5.95\")   Wt 74.6 kg (164 lb 7.4 oz)   SpO2 94%   BMI 26.59 kg/m² "     I/O:   Intake/Output Summary (Last 24 hours) at 10/19/2022 0820  Last data filed at 10/18/2022 2230  Gross per 24 hour   Intake --   Output 3410 ml   Net -3410 ml     Labs:    Most Recent Hematology Labs:    Lab Results   Component Value Date/Time    WBC 2.6 (L) 10/17/2022 0800    HEMOGLOBIN 10.9 (L) 10/17/2022 0800    .8 (H) 10/17/2022 0800    PLATELETCT 80 (L) 10/17/2022 0800    NEUTS 1.71 (L) 10/17/2022 0800       Most Recent Metabolic Panel:    Lab Results   Component Value Date/Time    POTASSIUM 3.4 (L) 10/18/2022 1330    CHLORIDE 106 10/18/2022 1330    CO2 25 10/18/2022 1330    GLUCOSE 116 (H) 10/18/2022 1330    BUN 5 (L) 10/18/2022 1330    CREATININE 0.62 10/18/2022 1330    CALCIUM 8.0 (L) 10/18/2022 1330    ANION 10.0 10/18/2022 1330         Physical Exam:    Constitutional: Very well-appearing.  HENT: Normocephalic and atraumatic. Alopecia.  No rhinorrhea. Oropharynx is clear and moist.  No ridging of bucca mucosa or on tongue.  No mucositis.  Eyes: Conjunctivae are normal. EOMI.   Neck: Normal range of motion of neck, no adenopathy.    Cardiovascular: Normal rate, regular rhythm.  No murmur. DP/radial pulses 2+, cap refill < 2 sec.  Pulmonary/Chest: Effort normall. No respiratory distress. Symmetric expansion.  No crackles or wheezes.  Abdomen: Soft. Bowel sounds are normal. No distension and no mass. There is no hepatosplenomegaly.    Genitourinary:  Deferred.  Musculoskeletal: Normal range of motion of lower and upper extremities bilaterally.   Neurological: Alert and oriented to person and place. Exhibits normal muscle tone bilaterally in upper and lower extremities. Gait normal. Coordination normal.    Skin: Skin is warm, dry and pink.  No rash or evidence of skin infection.   Psychiatric: Mood improved greatly from yesterday.    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 and  whose End of Induction IB MRD was both negative by flow cytometry and PCR who presents for scheduled chemotherapy, Interim Maintenance, Day 15 (4 days delayed due to thrombocytopenia)     1) Ph+ Precursor B-Cell Acute Lymphoblastic Leukemia, in MRD Remission:              - 2-3 weeks of symptoms              - Presenting WBC > 440 k/uL, hyperleukocytosis              - Start of Hydroxyurea (1500 mg PO Q8) 2320 on 5/27/2022  - discontinued after only 55 hours              - No steroid pretreatment              - 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              - Testicular status NEGATIVE                   - Flow cytometry of both 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, FISH positive for BCR-ABL1 translocation              - Enrolled on [facility] [id number]              - Initially enrolled on [facility] [id number] - but 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                            - Enrolled on [facility] [id number] and began study 6/13/2022              - Started imatinib therapy 6/3/2022 (split dosing of imatinib 400 mg PO QAM and 200 mg PO QPM) - continued at Day 15 of Induction 1A with imatinib 600 mg PO daily - remains 100% compliant with imatinib              - End of Induction IA and IB MRD negative                         - WBC 2.6, Hgb 10.9, platelets 80 on admission  - ANC 1710,  on admission  - Creatinine 0.58 (baseline) on admission  - AST 24, ALT 49, bilirubin 0.4 on admission     [id number], AR Arm B, Interim Maintenance, Day 17:                  ** Vincristine 2 mg IV x 1 dose on Day 15 (COMPLETE)              ** Methotrexate 935 IV over 30 minutes on Day 15 (COMPLETE)              ** Methotrexate 8415 mg IV Over 24.5 hours starting on Day 15 (COMPLETE)              ** Leucovorin 28 mg IV Q6H starting at Hour 42               ** Mercaptopurine 50 mg = 1 tab PO  x 6 days of the week, 25 mg = 0.5 tabs x 1 day/week)               ** Imatinib 600 mg PO AM while  hospitalized and unable to split tablet      - D5 1/4 NS + 30 mEq sodium bicarbonate at 230 ml/hr (=125 ml/m2/hr)     24-hour methotrexate 69.0 uM  42-hour methotrexate 0.73 uM  48-hour methotrexate 0.48 uM  Creatinine at baseline 0.64    - Given that 42-hour methotrexate less than 1.0 uM but 48-hour methotrexate level >0.4 uM, will continue with Q6H leucovorin until methotrexate levels at < 0.1    -Okay to discharge tomorrow if methotrexate level drops below 0.1 uM    2) Chemotherapy Related Pancytopenia:  - WBC 2.6, Hgb 10.9, platelets 80 on admission  - ANC 1710,  on admission              - No transfusion indicated on admission     3) Sixth Cranial Nerve Palsy (IMPROVED/RESOLVED):             - Followed by Dr. Carranza             - Improvement/Resolution of palsy, still treating some astigmatism      4) At Risk of Opportunistic Lung Infection:             - Holding Bactrim this past weekend, can resume 72 hours after clearance of methotrexate     5) Anxiety (IMPROVED GREATLY):     6) Social:             - Continue with support             - Continue school as tolerated     7) Access:               - R Port-A-Cath in place      8) Research Participant:        9) CNS3 Disease:              - Will received 18 Gy over 10 fractions in the first 4 weeks of Maintenance     Disposition: Inpatient for high-dose methotrexate.  Discharge pending clearance of methotrexate.  Plan for discharge early in a.m. tomorrow.       Pepe Faye MD  Pediatric Hematology / Oncology  [facility]  Cell.  [phone]  Office. [phone]

## 2022-10-20 LAB
ANION GAP SERPL CALC-SCNC: 11 MMOL/L (ref 7–16)
APPEARANCE UR: CLEAR
APPEARANCE UR: CLEAR
BILIRUB UR QL STRIP.AUTO: NEGATIVE
BILIRUB UR QL STRIP.AUTO: NEGATIVE
BUN SERPL-MCNC: 2 MG/DL (ref 8–22)
CALCIUM SERPL-MCNC: 8.4 MG/DL (ref 8.5–10.5)
CHLORIDE SERPL-SCNC: 107 MMOL/L (ref 96–112)
CO2 SERPL-SCNC: 23 MMOL/L (ref 20–33)
COLOR UR: YELLOW
COLOR UR: YELLOW
CREAT SERPL-MCNC: 0.63 MG/DL (ref 0.5–1.4)
GFR SERPLBLD CREATININE-BSD FMLA CKD-EPI: 139 ML/MIN/1.73 M 2
GLUCOSE SERPL-MCNC: 107 MG/DL (ref 65–99)
GLUCOSE UR STRIP.AUTO-MCNC: NEGATIVE MG/DL
GLUCOSE UR STRIP.AUTO-MCNC: NEGATIVE MG/DL
KETONES UR STRIP.AUTO-MCNC: NEGATIVE MG/DL
KETONES UR STRIP.AUTO-MCNC: NEGATIVE MG/DL
LEUKOCYTE ESTERASE UR QL STRIP.AUTO: NEGATIVE
LEUKOCYTE ESTERASE UR QL STRIP.AUTO: NEGATIVE
MICRO URNS: ABNORMAL
MICRO URNS: NORMAL
MTX SERPL-SCNC: 0.12 UMOL/L
MTX SERPL-SCNC: 0.14 UMOL/L
MTX SERPL-SCNC: 0.14 UMOL/L
NITRITE UR QL STRIP.AUTO: NEGATIVE
NITRITE UR QL STRIP.AUTO: NEGATIVE
PH UR STRIP.AUTO: 8 [PH] (ref 5–8)
PH UR STRIP.AUTO: 8.5 [PH] (ref 5–8)
POTASSIUM SERPL-SCNC: 3.4 MMOL/L (ref 3.6–5.5)
PROT UR QL STRIP: NEGATIVE MG/DL
PROT UR QL STRIP: NEGATIVE MG/DL
RBC UR QL AUTO: NEGATIVE
RBC UR QL AUTO: NEGATIVE
SODIUM SERPL-SCNC: 141 MMOL/L (ref 135–145)
SP GR UR STRIP.AUTO: 1
SP GR UR STRIP.AUTO: 1
UROBILINOGEN UR STRIP.AUTO-MCNC: 0.2 MG/DL
UROBILINOGEN UR STRIP.AUTO-MCNC: 0.2 MG/DL

## 2022-10-20 PROCEDURE — 700111 HCHG RX REV CODE 636 W/ 250 OVERRIDE (IP): Performed by: PEDIATRICS

## 2022-10-20 PROCEDURE — 700105 HCHG RX REV CODE 258: Performed by: PEDIATRICS

## 2022-10-20 PROCEDURE — 770000 HCHG ROOM/CARE - INTERMEDIATE ICU *

## 2022-10-20 PROCEDURE — 80299 QUANTITATIVE ASSAY DRUG: CPT

## 2022-10-20 PROCEDURE — 99232 SBSQ HOSP IP/OBS MODERATE 35: CPT | Performed by: PEDIATRICS

## 2022-10-20 PROCEDURE — 81003 URINALYSIS AUTO W/O SCOPE: CPT

## 2022-10-20 PROCEDURE — 80048 BASIC METABOLIC PNL TOTAL CA: CPT

## 2022-10-20 PROCEDURE — 700102 HCHG RX REV CODE 250 W/ 637 OVERRIDE(OP): Performed by: PEDIATRICS

## 2022-10-20 PROCEDURE — A9270 NON-COVERED ITEM OR SERVICE: HCPCS | Performed by: PEDIATRICS

## 2022-10-20 RX ADMIN — ONDANSETRON 8 MG: 2 INJECTION INTRAMUSCULAR; INTRAVENOUS at 06:08

## 2022-10-20 RX ADMIN — SODIUM BICARBONATE: 84 INJECTION, SOLUTION INTRAVENOUS at 03:51

## 2022-10-20 RX ADMIN — SODIUM BICARBONATE: 84 INJECTION, SOLUTION INTRAVENOUS at 08:47

## 2022-10-20 RX ADMIN — SODIUM BICARBONATE: 84 INJECTION, SOLUTION INTRAVENOUS at 13:12

## 2022-10-20 RX ADMIN — 0.12% CHLORHEXIDINE GLUCONATE 15 ML: 1.2 RINSE ORAL at 21:08

## 2022-10-20 RX ADMIN — LEUCOVORIN CALCIUM 28.1 MG: 500 INJECTION, POWDER, LYOPHILIZED, FOR SOLUTION INTRAMUSCULAR; INTRAVENOUS at 19:36

## 2022-10-20 RX ADMIN — SODIUM BICARBONATE: 84 INJECTION, SOLUTION INTRAVENOUS at 23:15

## 2022-10-20 RX ADMIN — LEUCOVORIN CALCIUM 28.1 MG: 500 INJECTION, POWDER, LYOPHILIZED, FOR SOLUTION INTRAMUSCULAR; INTRAVENOUS at 07:31

## 2022-10-20 RX ADMIN — MERCAPTOPURINE 50 MG: 50 TABLET ORAL at 06:07

## 2022-10-20 RX ADMIN — IMATINIB MESYLATE 600 MG: 400 TABLET, FILM COATED ORAL at 06:07

## 2022-10-20 RX ADMIN — 0.12% CHLORHEXIDINE GLUCONATE 15 ML: 1.2 RINSE ORAL at 08:47

## 2022-10-20 RX ADMIN — ONDANSETRON 8 MG: 2 INJECTION INTRAMUSCULAR; INTRAVENOUS at 22:13

## 2022-10-20 RX ADMIN — LEUCOVORIN CALCIUM 28.1 MG: 500 INJECTION, POWDER, LYOPHILIZED, FOR SOLUTION INTRAMUSCULAR; INTRAVENOUS at 01:50

## 2022-10-20 RX ADMIN — SODIUM BICARBONATE: 84 INJECTION, SOLUTION INTRAVENOUS at 18:00

## 2022-10-20 RX ADMIN — LEUCOVORIN CALCIUM 28.1 MG: 500 INJECTION, POWDER, LYOPHILIZED, FOR SOLUTION INTRAMUSCULAR; INTRAVENOUS at 13:27

## 2022-10-20 ASSESSMENT — PAIN DESCRIPTION - PAIN TYPE
TYPE: ACUTE PAIN

## 2022-10-20 NOTE — CARE PLAN
The patient is Stable - Low risk of patient condition declining or worsening    Shift Goals  Clinical Goals: monitor urine, no nausea, monitor MTX levels  Patient Goals: control nausea  Family Goals: No family present    Progress made toward(s) clinical / shift goals:    Problem: Knowledge Deficit - Standard  Goal: Patient and family/care givers will demonstrate understanding of plan of care, disease process/condition, diagnostic tests and medications  Note: Pt updated regarding labs.      Problem: Nutrition - Standard  Goal: Patient's nutritional and fluid intake will be adequate or improve  Note: Tolerating small amounts of fluids, but refuses solids- states nauseated. Refusing ativan at this time.        Urine pH remains with goal range 7-8.5.

## 2022-10-21 VITALS
DIASTOLIC BLOOD PRESSURE: 62 MMHG | SYSTOLIC BLOOD PRESSURE: 105 MMHG | TEMPERATURE: 97.4 F | OXYGEN SATURATION: 94 % | BODY MASS INDEX: 26.43 KG/M2 | HEART RATE: 76 BPM | HEIGHT: 66 IN | RESPIRATION RATE: 18 BRPM | WEIGHT: 164.46 LBS

## 2022-10-21 LAB
APPEARANCE UR: CLEAR
APPEARANCE UR: CLEAR
BILIRUB UR QL STRIP.AUTO: NEGATIVE
BILIRUB UR QL STRIP.AUTO: NEGATIVE
COLOR UR: YELLOW
COLOR UR: YELLOW
GLUCOSE UR STRIP.AUTO-MCNC: NEGATIVE MG/DL
GLUCOSE UR STRIP.AUTO-MCNC: NEGATIVE MG/DL
KETONES UR STRIP.AUTO-MCNC: NEGATIVE MG/DL
KETONES UR STRIP.AUTO-MCNC: NEGATIVE MG/DL
LEUKOCYTE ESTERASE UR QL STRIP.AUTO: NEGATIVE
LEUKOCYTE ESTERASE UR QL STRIP.AUTO: NEGATIVE
MICRO URNS: ABNORMAL
MICRO URNS: NORMAL
MTX SERPL-SCNC: 0.07 UMOL/L
NITRITE UR QL STRIP.AUTO: NEGATIVE
NITRITE UR QL STRIP.AUTO: NEGATIVE
PH UR STRIP.AUTO: 8 [PH] (ref 5–8)
PH UR STRIP.AUTO: 8.5 [PH] (ref 5–8)
PROT UR QL STRIP: NEGATIVE MG/DL
PROT UR QL STRIP: NEGATIVE MG/DL
RBC UR QL AUTO: NEGATIVE
RBC UR QL AUTO: NEGATIVE
SP GR UR STRIP.AUTO: 1.01
SP GR UR STRIP.AUTO: 1.01
UROBILINOGEN UR STRIP.AUTO-MCNC: 0.2 MG/DL
UROBILINOGEN UR STRIP.AUTO-MCNC: 1 MG/DL

## 2022-10-21 PROCEDURE — 80299 QUANTITATIVE ASSAY DRUG: CPT

## 2022-10-21 PROCEDURE — 700111 HCHG RX REV CODE 636 W/ 250 OVERRIDE (IP): Performed by: PEDIATRICS

## 2022-10-21 PROCEDURE — A9270 NON-COVERED ITEM OR SERVICE: HCPCS | Performed by: PEDIATRICS

## 2022-10-21 PROCEDURE — 700105 HCHG RX REV CODE 258: Performed by: PEDIATRICS

## 2022-10-21 PROCEDURE — 700102 HCHG RX REV CODE 250 W/ 637 OVERRIDE(OP): Performed by: PEDIATRICS

## 2022-10-21 PROCEDURE — 81003 URINALYSIS AUTO W/O SCOPE: CPT

## 2022-10-21 RX ORDER — HEPARIN SODIUM (PORCINE) LOCK FLUSH IV SOLN 100 UNIT/ML 100 UNIT/ML
500 SOLUTION INTRAVENOUS
Status: COMPLETED | OUTPATIENT
Start: 2022-10-21 | End: 2022-10-21

## 2022-10-21 RX ADMIN — ONDANSETRON 8 MG: 2 INJECTION INTRAMUSCULAR; INTRAVENOUS at 06:27

## 2022-10-21 RX ADMIN — SODIUM BICARBONATE: 84 INJECTION, SOLUTION INTRAVENOUS at 04:38

## 2022-10-21 RX ADMIN — MERCAPTOPURINE 50 MG: 50 TABLET ORAL at 06:47

## 2022-10-21 RX ADMIN — Medication 500 UNITS: at 09:48

## 2022-10-21 RX ADMIN — LEUCOVORIN CALCIUM 28.1 MG: 500 INJECTION, POWDER, LYOPHILIZED, FOR SOLUTION INTRAMUSCULAR; INTRAVENOUS at 01:32

## 2022-10-21 RX ADMIN — LEUCOVORIN CALCIUM 28.1 MG: 500 INJECTION, POWDER, LYOPHILIZED, FOR SOLUTION INTRAMUSCULAR; INTRAVENOUS at 07:33

## 2022-10-21 RX ADMIN — IMATINIB MESYLATE 600 MG: 400 TABLET, FILM COATED ORAL at 06:27

## 2022-10-21 RX ADMIN — 0.12% CHLORHEXIDINE GLUCONATE 15 ML: 1.2 RINSE ORAL at 07:54

## 2022-10-21 ASSESSMENT — PAIN DESCRIPTION - PAIN TYPE: TYPE: ACUTE PAIN

## 2022-10-21 NOTE — PROGRESS NOTES
Pt. discharged home with self. Patient given and educated on discharge instructions, follow up appointments and medications/prescriptions. Patient verbalized understanding and signed discharge packet. No questions or concerns at this time. Patient left with all personal belongings.

## 2022-10-21 NOTE — PROGRESS NOTES
Assumed care of pt. Recieved report from night RNPat. Pt. awake in bed, in room air and has no apparent signs of respiratory distress at this time. No family/visitors at bedside with patient. Patient updated on POC. Updated white board. No questions or concerns at this time.

## 2022-10-21 NOTE — PROGRESS NOTES
"Pediatric Hematology/Oncology  Daily Progress Note      Patient Name:  Tomas Jean-Baptiste  : 2000  MRN: [id number]    Location of Service:  [facility]  Date of Service: 10/21/2022  Time: 8:15 AM    Hospital Day: 5    Protocol / Treatment Plan:  [place] Chromosome Precursor B-Cell Acute Lymphoblastic Leukemia, ON STUDY [id number], Interim Maintenance, Day 19    SUBJECTIVE:     No acute events overnight.  Afebrile.  Energy and activity are good.  No complaints of any mucositis or mouth sores.  Eating and drinking normally.  No complaints of any headaches, changes in vision or neurologic status changes.   Tomas Roy is tolerating all of his oral medications at this time.  No other concerns or complaints.    Review of Systems:     Constitutional: Afebrile, feeling much better than yesterday.  Improved appetite.  Improved oral intake.  HENT: Negative.  No mouth sores.  No sore throat.  Eyes: Negative for visual changes.  Respiratory: Negative for shortness of breath.  Cardiovascular: Negative.  Gastrointestinal: Negative for nausea, vomiting, abdominal pain, diarrhea, constipation.  Genitourinary: Negative.  Musculoskeletal: Negative.  Skin: Negative for rash or skin infection.  Neurological: Negative for numbness, tingling, sensory changes, weakness or headaches.    Endo/Heme/Allergies: No bruising/bleeding easily.    Psychiatric/Behavioral: Good mood.     OBJECTIVE:     Max Temp: Temp (24hrs), Av.6 °C (97.9 °F), Min:36.3 °C (97.4 °F), Max:36.8 °C (98.2 °F)      Vitals: /62   Pulse 76   Temp 36.3 °C (97.4 °F) (Temporal)   Resp 18   Ht 1.675 m (5' 5.95\")   Wt 74.6 kg (164 lb 7.4 oz)   SpO2 94%   BMI 26.59 kg/m²     I/O:   Intake/Output Summary (Last 24 hours) at 10/21/2022 0815  Last data filed at 10/20/2022 2200  Gross per 24 hour   Intake 200 ml   Output 2250 ml   Net -2050 ml       Labs:    Most Recent Hematology Labs:    Lab Results   Component " Value Date/Time    WBC 2.6 (L) 10/17/2022 0800    HEMOGLOBIN 10.9 (L) 10/17/2022 0800    .8 (H) 10/17/2022 0800    PLATELETCT 80 (L) 10/17/2022 0800    NEUTS 1.71 (L) 10/17/2022 0800       Most Recent Metabolic Panel:    Lab Results   Component Value Date/Time    POTASSIUM 3.4 (L) 10/20/2022 1445    CHLORIDE 107 10/20/2022 1445    CO2 23 10/20/2022 1445    GLUCOSE 107 (H) 10/20/2022 1445    BUN 2 (L) 10/20/2022 1445    CREATININE 0.63 10/20/2022 1445    CALCIUM 8.4 (L) 10/20/2022 1445    ANION 11.0 10/20/2022 1445     Physical Exam:    Constitutional: Much improved, well appearing.  Thin and pale.  Very well-appearing.  HENT: Normocephalic and atraumatic. Alopecia.  No rhinorrhea. Oropharynx is clear and moist.   Eyes: Conjunctivae are normal. EOMI.   Neck: Normal range of motion of neck, no adenopathy.    Cardiovascular: Normal rate, regular rhythm.  No murmur. DP/radial pulses 2+, cap refill < 2 sec  Pulmonary/Chest: Effort normall. No respiratory distress. Symmetric expansion.  No crackles or wheezes.  Abdomen: Soft. Bowel sounds are normal. No distension and no mass. There is no hepatosplenomegaly.    Genitourinary:  Deferred.  Musculoskeletal: Normal range of motion of lower and upper extremities bilaterally.   Neurological: Alert and oriented to person and place. Exhibits normal muscle tone bilaterally in upper and lower extremities. Gait normal. Coordination normal.    Skin: Skin is warm, dry and pink.  No rash or evidence of skin infection.   Psychiatric: Mood is good.    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 and whose End of Induction IB MRD was both negative by flow cytometry and PCR who presents for scheduled chemotherapy, Interim Maintenance, Day 15 (4 days delayed due to thrombocytopenia)     1) Ph+ Precursor B-Cell Acute Lymphoblastic Leukemia, in MRD Remission:               - 2-3 weeks of symptoms              - Presenting WBC > 440 k/uL, hyperleukocytosis              - Start of Hydroxyurea (1500 mg PO Q8) 2320 on 5/27/2022  - discontinued after only 55 hours              - No steroid pretreatment              - 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              - Testicular status NEGATIVE                   - Flow cytometry of both 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, FISH positive for BCR-ABL1 translocation              - [place] [facility] [id number]              - Initially [place] [facility] [id number] - but 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                            - [place] [facility] [id number] and began study 6/13/2022              - Started imatinib therapy 6/3/2022 (split dosing of imatinib 400 mg PO QAM and 200 mg PO QPM) - continued at Day 15 of Induction 1A with imatinib 600 mg PO daily - remains 100% compliant with imatinib              - End of Induction IA and IB MRD negative                         - WBC 2.6, Hgb 10.9, platelets 80 on admission  - ANC 1710,  on admission  - Creatinine 0.58 (baseline) on admission  - AST 24, ALT 49, bilirubin 0.4 on admission     [place] [id number], AR Arm B, Interim Maintenance, Day 18:                  ** Vincristine 2 mg IV x 1 dose on Day 15 (COMPLETE)              ** Methotrexate 935 IV over 30 minutes on Day 15 (COMPLETE)              ** Methotrexate 8415 mg IV Over 24.5 hours starting on Day 15 (COMPLETE)              ** Leucovorin 28 mg IV Q6H starting at Hour 42               ** Mercaptopurine 50 mg = 1 tab PO  x 6 days of the week, 25 mg = 0.5 tabs x 1 day/week)               ** Imatinib 600 mg PO AM while hospitalized and unable to split tablet      - D5 1/4 NS + 30 mEq sodium bicarbonate at 230 ml/hr (=125 ml/m2/hr)     24-hour methotrexate 69.0 uM  42-hour methotrexate 0.73 uM  48-hour methotrexate 0.48 uM  Creatinine remains at baseline  65-hour methotrexate 0.14  uM  73-hour methotrexate 0.14 uM    MTX this morning 0.07 uM and cleared     2) Chemotherapy Related Pancytopenia:  - WBC 2.6, Hgb 10.9, platelets 80 on admission  - ANC 1710,  on admission              - No transfusion indicated on admission     3) Sixth Cranial Nerve Palsy (IMPROVED/RESOLVED):             - Followed by Dr. Carranza             - Improvement/Resolution of palsy, still treating some astigmatism      4) At Risk of Opportunistic Lung Infection:             - May resume Bactrim on Monday Tuesday of this week     5) Anxiety (IMPROVED GREATLY):     6) Social:             - Continue with support             - Continue school as tolerated     7) Access:               - R Port-A-Cath in place      8) Research Participant:        9) CNS3 Disease:              - Will received 18 Gy over 10 fractions in the first 4 weeks of Maintenance     Disposition: Discharge today.  Return to clinic in 2 weeks for third of 4 cycles of high-dose methotrexate.    Pepe Faye MD  Pediatric Hematology / Oncology  [facility]  Cell.  [phone]  [facility]. [phone]

## 2022-10-21 NOTE — DISCHARGE INSTRUCTIONS
PATIENT INSTRUCTIONS:      Given by:   Nurse    Instructed in:  If yes, include date/comment and person who did the instructions       A.D.L:       Yes, as tolerated.             Activity:      Yes, as tolerated.    Diet::          Yes, please continue at home regular diet as tolerated.     Medication:  Yes, please see attached Medication List.    Equipment:  NA    Treatment:  NA      Other:          Yes, if any new and/or worsening symptoms appear, please contact your Oncologist and/or return to the Emergency Department.     Education Class:  NA    Patient/Family verbalized/demonstrated understanding of above Instructions:  yes  __________________________________________________________________________    OBJECTIVE CHECKLIST  Patient/Family has:    All medications brought from home   NA  Valuables from safe                            NA  Prescriptions                                       Yes  All personal belongings                       Yes  Equipment (oxygen, apnea monitor, wheelchair)     NA  Other: NA

## 2022-10-21 NOTE — PROGRESS NOTES
"Pediatric Hematology/Oncology  Daily Progress Note      Patient Name:  Tomas Jean-Baptiste  : 2000  MRN: [id number]    Location of Service:  [facility] - [facility]  Date of Service: 10/20/2022  Time: 9:00 AM    Hospital Day: 4  Protocol / Treatment Plan:[place]aca Chromosome Precursor B-Cell Acute Lymphoblastic Leukemia, ON STUDY [id number], Interim Maintenance, Day 18    SUBJECTIVE:     No acute events overnight.  Afebrile. Tomas Ryo reports feeling quite well this morning.  No further complaints of any nausea or vomiting.  Tolerating fluids.  No complaints of any headaches, changes in vision or neurologic status changes. Tomas Ryo denies any mouth sores or sore throat.  Still with relatively poor oral intake but does have improved appetite.  Taking imatinib and mercaptopurine as instructed.  No other concerns or complaints at this time.    Review of Systems:     Constitutional: Afebrile, feeling much better than yesterday.  Improved appetite.  HENT: No sore throat.  No mouth sores.  Eyes: Negative for visual changes.  Respiratory: Negative for shortness of breath..   Cardiovascular: Negative    Gastrointestinal: Negative for nausea, vomiting, abdominal pain, diarrhea, constipation .    Genitourinary: Negative.  Musculoskeletal: Negative.    Skin: Negative for rash or skin infection.  Neurological: Negative for numbness, tingling, sensory changes, weakness or headaches.    Endo/Heme/Allergies: No bruising/bleeding easily.    Psychiatric/Behavioral: Still with good mood.     OBJECTIVE:     Max Temp: Temp (24hrs), Av.7 °C (98.1 °F), Min:36.5 °C (97.7 °F), Max:36.9 °C (98.4 °F)    Vitals: /74   Pulse 79   Temp 36.8 °C (98.2 °F) (Temporal)   Resp 18   Ht 1.675 m (5' 5.95\")   Wt 74.6 kg (164 lb 7.4 oz)   SpO2 97%   BMI 26.59 kg/m²     I/O:   Intake/Output Summary (Last 24 hours) at 10/20/2022 4057  Last data filed at 10/20/2022 1954  Gross per 24 hour "   Intake 1306 ml   Output 2400 ml   Net -1094 ml       Labs:    Most Recent Hematology Labs:    Lab Results   Component Value Date/Time    WBC 2.6 (L) 10/17/2022 0800    HEMOGLOBIN 10.9 (L) 10/17/2022 0800    .8 (H) 10/17/2022 0800    PLATELETCT 80 (L) 10/17/2022 0800    NEUTS 1.71 (L) 10/17/2022 0800       Most Recent Metabolic Panel:    Lab Results   Component Value Date/Time    POTASSIUM 3.4 (L) 10/20/2022 1445    CHLORIDE 107 10/20/2022 1445    CO2 23 10/20/2022 1445    GLUCOSE 107 (H) 10/20/2022 1445    BUN 2 (L) 10/20/2022 1445    CREATININE 0.63 10/20/2022 1445    CALCIUM 8.4 (L) 10/20/2022 1445    ANION 11.0 10/20/2022 1445     9/29/2022: Methotrexate Sensi 61.00 umol/L (Ref range: umol/L)  9/30/2022: Methotrexate Sensi 85.00 umol/L (Ref range: umol/L); Methotrexate Sensi 23.00 umol/L (Ref range: umol/L)  10/1/2022: Methotrexate Sensi 0.47 umol/L (Ref range: umol/L); Methotrexate Sensi 0.27 umol/L (Ref range: umol/L)  10/2/2022: Methotrexate Sensi 0.09 umol/L (Ref range: umol/L)  10/18/2022: Methotrexate Sensi 69.00 umol/L (Ref range: umol/L)  10/19/2022: Methotrexate Sensi 0.73 umol/L (Ref range: umol/L); Methotrexate Sensi 0.48 umol/L (Ref range: umol/L)  10/20/2022: Methotrexate Sensi 0.14 umol/L (Ref range: umol/L); Methotrexate Sensi 0.14 umol/L (Ref range: umol/L); Methotrexate Sensi 0.12 umol/L (Ref range: umol/L)    Physical Exam:    Constitutional: Overall very well-appearing.  HENT: Normocephalic and atraumatic. Alopecia.  No rhinorrhea. Oropharynx is clear and moist.   Eyes: Conjunctivae are normal. EOMI. Non-icteric.  Neck: Normal range of motion of neck, no adenopathy.    Cardiovascular: Normal rate, regular rhythm.  No murmur. DP/radial pulses 2+, cap refill < 2 sec.  Pulmonary/Chest: Effort normal. No respiratory distress. Symmetric expansion.  No crackles or wheezes.  Abdomen: Soft. Bowel sounds are normal. No distension and no mass. There is no hepatosplenomegaly.    Genitourinary:   Deferred  Musculoskeletal: Normal range of motion of lower and upper extremities bilaterally. Neurological: Alert and oriented to person and place. Exhibits normal muscle tone bilaterally in upper and lower extremities. Gait not assessed. Coordination normal.    Skin: Skin is warm, dry and pink.  No rash or evidence of skin infection.   Psychiatric: Mood stable.    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 and whose End of Induction IB MRD was both negative by flow cytometry and PCR who presents for scheduled chemotherapy, Interim Maintenance, Day 15 (4 days delayed due to thrombocytopenia)     1) Ph+ Precursor B-Cell Acute Lymphoblastic Leukemia, in MRD Remission:              - 2-3 weeks of symptoms              - Presenting WBC > 440 k/uL, hyperleukocytosis              - Start of Hydroxyurea (1500 mg PO Q8) 2320 on 5/27/2022  - discontinued after only 55 hours              - No steroid pretreatment              - 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              - Testicular status NEGATIVE                   - Flow cytometry of both 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, FISH positive for BCR-ABL1 translocation              - Enrolled on [facility] [id number]              - Initially enrolled on [facility] [id number] - but 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                            - Enrolled on [facility] [id number] and began study 6/13/2022              - Started imatinib therapy 6/3/2022 (split dosing of imatinib 400 mg PO QAM and 200 mg PO QPM) - continued at Day 15 of Induction 1A with imatinib 600 mg PO daily - remains 100% compliant with imatinib              - End of Induction IA and IB MRD negative                         - WBC 2.6, Hgb 10.9, platelets 80 on admission  - ANC 1710,  on admission  - Creatinine 0.58 (baseline) on admission  -  AST 24, ALT 49, bilirubin 0.4 on admission     [id number], AR Arm B, Interim Maintenance, Day 18:                  ** Vincristine 2 mg IV x 1 dose on Day 15 (COMPLETE)              ** Methotrexate 935 IV over 30 minutes on Day 15 (COMPLETE)              ** Methotrexate 8415 mg IV Over 24.5 hours starting on Day 15 (COMPLETE)              ** Leucovorin 28 mg IV Q6H starting at Hour 42               ** Mercaptopurine 50 mg = 1 tab PO  x 6 days of the week, 25 mg = 0.5 tabs x 1 day/week)               ** Imatinib 600 mg PO AM while hospitalized and unable to split tablet      - D5 1/4 NS + 30 mEq sodium bicarbonate at 230 ml/hr (=125 ml/m2/hr)     24-hour methotrexate 69.0 uM  42-hour methotrexate 0.73 uM  48-hour methotrexate 0.48 uM  Creatinine remains at baseline  65-hour methotrexate 0.14 uM  73-hour methotrexate 0.14 uM     - Given that 42-hour methotrexate less than 1.0 uM but 48-hour methotrexate level >0.4 uM, will continue with Q6H leucovorin until methotrexate levels at < 0.1    - Still waiting for methotrexate to be cleared    2) Chemotherapy Related Pancytopenia:  - WBC 2.6, Hgb 10.9, platelets 80 on admission  - ANC 1710,  on admission              - No transfusion indicated on admission     3) Sixth Cranial Nerve Palsy (IMPROVED/RESOLVED):             - Followed by Dr. Carranza             - Improvement/Resolution of palsy, still treating some astigmatism      4) At Risk of Opportunistic Lung Infection:             - Holding Bactrim this past weekend, can resume 72 hours after clearance of methotrexate     5) Anxiety (IMPROVED GREATLY):     6) Social:             - Continue with support             - Continue school as tolerated     7) Access:               - R Port-A-Cath in place      8) Research Participant:        9) CNS3 Disease:              - Will received 18 Gy over 10 fractions in the first 4 weeks of Maintenance     Disposition: Inpatient for high-dose methotrexate.  Discharge pending  clearance of methotrexate.  Plan for discharge early in a.m. tomorrow.       Pepe Faye MD  Pediatric Hematology / Oncology  [facility]  Cell.  [phone]  [facility]. [phone]

## 2022-10-22 DIAGNOSIS — C91.00 PHILADELPHIA CHROMOSOME POSITIVE ACUTE LYMPHOBLASTIC LEUKEMIA (ALL) (HCC): ICD-10-CM

## 2022-10-24 PROCEDURE — 99238 HOSP IP/OBS DSCHRG MGMT 30/<: CPT | Performed by: PEDIATRICS

## 2022-10-24 RX ORDER — IMATINIB MESYLATE 100 MG/1
TABLET, FILM COATED ORAL
Qty: 60 TABLET | Refills: 3 | Status: SHIPPED | OUTPATIENT
Start: 2022-10-24 | End: 2022-12-27

## 2022-10-24 RX ORDER — IMATINIB MESYLATE 400 MG/1
TABLET, FILM COATED ORAL
Qty: 30 TABLET | Refills: 3 | Status: SHIPPED | OUTPATIENT
Start: 2022-10-24 | End: 2023-01-15 | Stop reason: SDUPTHER
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
## 2022-10-25 LAB — CYTOLOGY REG CYTOL: NORMAL

## 2022-10-31 ENCOUNTER — HOSPITAL ENCOUNTER (OUTPATIENT)
Dept: INFUSION CENTER | Facility: MEDICAL CENTER | Age: 22
End: 2022-10-31
Attending: PEDIATRICS
Payer: COMMERCIAL

## 2022-10-31 DIAGNOSIS — C91.Z0 B LYMPHOBLASTIC LEUKEMIA WITH T(9;22)(Q34;Q11.2);BCR-ABL1 (HCC): ICD-10-CM

## 2022-10-31 LAB
ALBUMIN SERPL BCP-MCNC: 5 G/DL (ref 3.2–4.9)
ALBUMIN/GLOB SERPL: 2.4 G/DL
ALP SERPL-CCNC: 70 U/L (ref 30–99)
ALT SERPL-CCNC: 94 U/L (ref 2–50)
ANION GAP SERPL CALC-SCNC: 14 MMOL/L (ref 7–16)
AST SERPL-CCNC: 39 U/L (ref 12–45)
BASOPHILS # BLD AUTO: 0 % (ref 0–1.8)
BASOPHILS # BLD: 0 K/UL (ref 0–0.12)
BILIRUB SERPL-MCNC: 0.3 MG/DL (ref 0.1–1.5)
BUN SERPL-MCNC: 9 MG/DL (ref 8–22)
CALCIUM SERPL-MCNC: 9.3 MG/DL (ref 8.5–10.5)
CHLORIDE SERPL-SCNC: 104 MMOL/L (ref 96–112)
CO2 SERPL-SCNC: 21 MMOL/L (ref 20–33)
CREAT SERPL-MCNC: 0.64 MG/DL (ref 0.5–1.4)
EOSINOPHIL # BLD AUTO: 0.03 K/UL (ref 0–0.51)
EOSINOPHIL NFR BLD: 1.6 % (ref 0–6.9)
ERYTHROCYTE [DISTWIDTH] IN BLOOD BY AUTOMATED COUNT: 59 FL (ref 35.9–50)
GFR SERPLBLD CREATININE-BSD FMLA CKD-EPI: 138 ML/MIN/1.73 M 2
GLOBULIN SER CALC-MCNC: 2.1 G/DL (ref 1.9–3.5)
GLUCOSE SERPL-MCNC: 98 MG/DL (ref 65–99)
HCT VFR BLD AUTO: 32.9 % (ref 42–52)
HGB BLD-MCNC: 11.2 G/DL (ref 14–18)
IMM GRANULOCYTES # BLD AUTO: 0 K/UL (ref 0–0.11)
IMM GRANULOCYTES NFR BLD AUTO: 0 % (ref 0–0.9)
LYMPHOCYTES # BLD AUTO: 0.79 K/UL (ref 1–4.8)
LYMPHOCYTES NFR BLD: 41.1 % (ref 22–41)
MCH RBC QN AUTO: 35.6 PG (ref 27–33)
MCHC RBC AUTO-ENTMCNC: 34 G/DL (ref 33.7–35.3)
MCV RBC AUTO: 104.4 FL (ref 81.4–97.8)
MONOCYTES # BLD AUTO: 0.28 K/UL (ref 0–0.85)
MONOCYTES NFR BLD AUTO: 14.6 % (ref 0–13.4)
NEUTROPHILS # BLD AUTO: 0.82 K/UL (ref 1.82–7.42)
NEUTROPHILS NFR BLD: 42.7 % (ref 44–72)
NRBC # BLD AUTO: 0 K/UL
NRBC BLD-RTO: 0 /100 WBC
PLATELET # BLD AUTO: 118 K/UL (ref 164–446)
PMV BLD AUTO: 10.4 FL (ref 9–12.9)
POTASSIUM SERPL-SCNC: 4 MMOL/L (ref 3.6–5.5)
PROT SERPL-MCNC: 7.1 G/DL (ref 6–8.2)
RBC # BLD AUTO: 3.15 M/UL (ref 4.7–6.1)
SODIUM SERPL-SCNC: 139 MMOL/L (ref 135–145)
WBC # BLD AUTO: 1.9 K/UL (ref 4.8–10.8)

## 2022-10-31 PROCEDURE — 36591 DRAW BLOOD OFF VENOUS DEVICE: CPT

## 2022-10-31 PROCEDURE — 85025 COMPLETE CBC W/AUTO DIFF WBC: CPT

## 2022-10-31 PROCEDURE — 80053 COMPREHEN METABOLIC PANEL: CPT

## 2022-10-31 PROCEDURE — 700111 HCHG RX REV CODE 636 W/ 250 OVERRIDE (IP): Performed by: PEDIATRICS

## 2022-10-31 PROCEDURE — A4212 NON CORING NEEDLE OR STYLET: HCPCS

## 2022-10-31 RX ORDER — SODIUM CHLORIDE 9 MG/ML
20 INJECTION, SOLUTION INTRAVENOUS PRN
Status: CANCELLED | OUTPATIENT
Start: 2022-11-01

## 2022-10-31 RX ORDER — HEPARIN SODIUM (PORCINE) LOCK FLUSH IV SOLN 100 UNIT/ML 100 UNIT/ML
500 SOLUTION INTRAVENOUS PRN
Status: CANCELLED | OUTPATIENT
Start: 2022-10-31

## 2022-10-31 RX ORDER — LIDOCAINE AND PRILOCAINE 25; 25 MG/G; MG/G
CREAM TOPICAL PRN
Status: CANCELLED | OUTPATIENT
Start: 2022-11-01

## 2022-10-31 RX ORDER — HEPARIN SODIUM,PORCINE 10 UNIT/ML
30 VIAL (ML) INTRAVENOUS PRN
Status: CANCELLED | OUTPATIENT
Start: 2022-10-31

## 2022-10-31 RX ORDER — ONDANSETRON 2 MG/ML
8 INJECTION INTRAMUSCULAR; INTRAVENOUS ONCE
Status: CANCELLED | OUTPATIENT
Start: 2022-11-01

## 2022-10-31 RX ORDER — PROMETHAZINE HYDROCHLORIDE 6.25 MG/5ML
0.25 SYRUP ORAL EVERY 6 HOURS PRN
Status: CANCELLED | OUTPATIENT
Start: 2022-11-01

## 2022-10-31 RX ORDER — HEPARIN SODIUM (PORCINE) LOCK FLUSH IV SOLN 100 UNIT/ML 100 UNIT/ML
500 SOLUTION INTRAVENOUS PRN
Status: DISCONTINUED | OUTPATIENT
Start: 2022-10-31 | End: 2022-11-01 | Stop reason: HOSPADM

## 2022-10-31 RX ORDER — 0.9 % SODIUM CHLORIDE 0.9 %
20 VIAL (ML) INJECTION PRN
Status: CANCELLED | OUTPATIENT
Start: 2022-10-31

## 2022-10-31 RX ORDER — ONDANSETRON 2 MG/ML
8 INJECTION INTRAMUSCULAR; INTRAVENOUS EVERY 8 HOURS
Status: CANCELLED | OUTPATIENT
Start: 2022-11-01

## 2022-10-31 RX ORDER — LORAZEPAM 2 MG/ML
1 CONCENTRATE ORAL EVERY 6 HOURS PRN
Status: CANCELLED | OUTPATIENT
Start: 2022-11-01

## 2022-10-31 RX ORDER — SODIUM CHLORIDE 9 MG/ML
500 INJECTION, SOLUTION INTRAVENOUS CONTINUOUS
Status: CANCELLED | OUTPATIENT
Start: 2022-11-01

## 2022-10-31 RX ORDER — ONDANSETRON 2 MG/ML
8 INJECTION INTRAMUSCULAR; INTRAVENOUS EVERY 8 HOURS PRN
Status: CANCELLED | OUTPATIENT
Start: 2022-11-01

## 2022-10-31 RX ADMIN — HEPARIN 500 UNITS: 100 SYRINGE at 15:15

## 2022-10-31 NOTE — PROGRESS NOTES
Pt to [facility] for lab draw.  Awake and alert in no acute distress.  Port accessed with 22 g 3/4 in   and labs drawn from the port without difficulty / with 1 attempt by Mary Saravia RN.   Pt tolerated well.  Plan to be admitted for chemotherapy tomorrow. Will follow up with patient with lab results and plan of care.

## 2022-11-01 ENCOUNTER — HOSPITAL ENCOUNTER (INPATIENT)
Facility: MEDICAL CENTER | Age: 22
LOS: 4 days | DRG: 837 | End: 2022-11-05
Attending: PEDIATRICS | Admitting: PEDIATRICS
Payer: COMMERCIAL

## 2022-11-01 DIAGNOSIS — C91.00 PHILADELPHIA CHROMOSOME POSITIVE ACUTE LYMPHOBLASTIC LEUKEMIA (ALL) (HCC): ICD-10-CM

## 2022-11-01 DIAGNOSIS — Z51.11 ENCOUNTER FOR ANTINEOPLASTIC CHEMOTHERAPY: ICD-10-CM

## 2022-11-01 DIAGNOSIS — Z51.11 CHEMOTHERAPY MANAGEMENT, ENCOUNTER FOR: ICD-10-CM

## 2022-11-01 LAB
ALBUMIN SERPL BCP-MCNC: 4.9 G/DL (ref 3.2–4.9)
ALBUMIN/GLOB SERPL: 2.6 G/DL
ALP SERPL-CCNC: 66 U/L (ref 30–99)
ALT SERPL-CCNC: 78 U/L (ref 2–50)
ANION GAP SERPL CALC-SCNC: 13 MMOL/L (ref 7–16)
APPEARANCE UR: CLEAR
AST SERPL-CCNC: 32 U/L (ref 12–45)
BILIRUB SERPL-MCNC: 0.3 MG/DL (ref 0.1–1.5)
BILIRUB UR QL STRIP.AUTO: NEGATIVE
BUN SERPL-MCNC: 9 MG/DL (ref 8–22)
BURR CELLS/RBC NFR CSF MANUAL: 0 %
CALCIUM SERPL-MCNC: 9.1 MG/DL (ref 8.5–10.5)
CHLORIDE SERPL-SCNC: 104 MMOL/L (ref 96–112)
CLARITY CSF: CLEAR
CO2 SERPL-SCNC: 22 MMOL/L (ref 20–33)
COLOR CSF: COLORLESS
COLOR SPUN CSF: COLORLESS
COLOR UR: YELLOW
CREAT SERPL-MCNC: 0.82 MG/DL (ref 0.5–1.4)
CYTOLOGY REG CYTOL: NORMAL
GFR SERPLBLD CREATININE-BSD FMLA CKD-EPI: 128 ML/MIN/1.73 M 2
GLOBULIN SER CALC-MCNC: 1.9 G/DL (ref 1.9–3.5)
GLUCOSE SERPL-MCNC: 110 MG/DL (ref 65–99)
GLUCOSE UR STRIP.AUTO-MCNC: NEGATIVE MG/DL
KETONES UR STRIP.AUTO-MCNC: NEGATIVE MG/DL
LEUKOCYTE ESTERASE UR QL STRIP.AUTO: NEGATIVE
LYMPHOCYTES NFR CSF: 91 %
MICRO URNS: NORMAL
MONONUC CELLS NFR CSF: 9 %
NITRITE UR QL STRIP.AUTO: NEGATIVE
PH UR STRIP.AUTO: 5.5 [PH] (ref 5–8)
POTASSIUM SERPL-SCNC: 4 MMOL/L (ref 3.6–5.5)
PROT SERPL-MCNC: 6.8 G/DL (ref 6–8.2)
PROT UR QL STRIP: NEGATIVE MG/DL
RBC # CSF: 4 CELLS/UL
RBC UR QL AUTO: NEGATIVE
SODIUM SERPL-SCNC: 139 MMOL/L (ref 135–145)
SP GR UR STRIP.AUTO: 1.02
SPECIMEN VOL CSF: 1.5 ML
TUBE # CSF: 2
TUBE # CSF: 2
UROBILINOGEN UR STRIP.AUTO-MCNC: 0.2 MG/DL
WBC # CSF: 2 CELLS/UL (ref 0–10)

## 2022-11-01 PROCEDURE — A9270 NON-COVERED ITEM OR SERVICE: HCPCS | Performed by: PEDIATRICS

## 2022-11-01 PROCEDURE — 700105 HCHG RX REV CODE 258: Performed by: PEDIATRICS

## 2022-11-01 PROCEDURE — 770000 HCHG ROOM/CARE - INTERMEDIATE ICU *

## 2022-11-01 PROCEDURE — 700111 HCHG RX REV CODE 636 W/ 250 OVERRIDE (IP): Performed by: PEDIATRICS

## 2022-11-01 PROCEDURE — 99222 1ST HOSP IP/OBS MODERATE 55: CPT | Mod: 25 | Performed by: PEDIATRICS

## 2022-11-01 PROCEDURE — 3E0R305 INTRODUCTION OF OTHER ANTINEOPLASTIC INTO SPINAL CANAL, PERCUTANEOUS APPROACH: ICD-10-PCS | Performed by: PEDIATRICS

## 2022-11-01 PROCEDURE — 700102 HCHG RX REV CODE 250 W/ 637 OVERRIDE(OP): Performed by: PEDIATRICS

## 2022-11-01 PROCEDURE — 80053 COMPREHEN METABOLIC PANEL: CPT

## 2022-11-01 PROCEDURE — 89051 BODY FLUID CELL COUNT: CPT

## 2022-11-01 PROCEDURE — 700101 HCHG RX REV CODE 250: Performed by: PEDIATRICS

## 2022-11-01 PROCEDURE — 88108 CYTOPATH CONCENTRATE TECH: CPT

## 2022-11-01 PROCEDURE — 81003 URINALYSIS AUTO W/O SCOPE: CPT

## 2022-11-01 PROCEDURE — 96450 CHEMOTHERAPY INTO CNS: CPT | Performed by: PEDIATRICS

## 2022-11-01 RX ORDER — SODIUM CHLORIDE 9 MG/ML
20 INJECTION, SOLUTION INTRAVENOUS PRN
Status: ACTIVE | OUTPATIENT
Start: 2022-11-01 | End: 2022-11-02

## 2022-11-01 RX ORDER — ONDANSETRON 2 MG/ML
8 INJECTION INTRAMUSCULAR; INTRAVENOUS EVERY 8 HOURS PRN
Status: DISCONTINUED | OUTPATIENT
Start: 2022-11-01 | End: 2022-11-05 | Stop reason: HOSPADM

## 2022-11-01 RX ORDER — PROMETHAZINE HYDROCHLORIDE 6.25 MG/5ML
0.25 SYRUP ORAL EVERY 6 HOURS PRN
Status: DISCONTINUED | OUTPATIENT
Start: 2022-11-01 | End: 2022-11-05 | Stop reason: HOSPADM

## 2022-11-01 RX ORDER — ONDANSETRON 2 MG/ML
8 INJECTION INTRAMUSCULAR; INTRAVENOUS EVERY 8 HOURS
Status: DISCONTINUED | OUTPATIENT
Start: 2022-11-01 | End: 2022-11-05 | Stop reason: HOSPADM

## 2022-11-01 RX ORDER — CHLORHEXIDINE GLUCONATE ORAL RINSE 1.2 MG/ML
15 SOLUTION DENTAL 2 TIMES DAILY
Status: DISCONTINUED | OUTPATIENT
Start: 2022-11-01 | End: 2022-11-05 | Stop reason: HOSPADM

## 2022-11-01 RX ORDER — MERCAPTOPURINE 50 MG/1
50 TABLET ORAL
Status: DISCONTINUED | OUTPATIENT
Start: 2022-11-02 | End: 2022-11-05 | Stop reason: HOSPADM

## 2022-11-01 RX ORDER — MIDAZOLAM HYDROCHLORIDE 1 MG/ML
2 INJECTION INTRAMUSCULAR; INTRAVENOUS ONCE
Status: COMPLETED | OUTPATIENT
Start: 2022-11-01 | End: 2022-11-01

## 2022-11-01 RX ORDER — MERCAPTOPURINE 50 MG/1
50 TABLET ORAL DAILY
Status: COMPLETED | OUTPATIENT
Start: 2022-11-01 | End: 2022-11-01

## 2022-11-01 RX ORDER — IMATINIB MESYLATE 100 MG/1
200 TABLET, FILM COATED ORAL ONCE
Status: COMPLETED | OUTPATIENT
Start: 2022-11-01 | End: 2022-11-01

## 2022-11-01 RX ORDER — LORAZEPAM 2 MG/ML
1 CONCENTRATE ORAL EVERY 6 HOURS PRN
Status: DISCONTINUED | OUTPATIENT
Start: 2022-11-01 | End: 2022-11-01

## 2022-11-01 RX ORDER — ONDANSETRON 2 MG/ML
8 INJECTION INTRAMUSCULAR; INTRAVENOUS ONCE
Status: ACTIVE | OUTPATIENT
Start: 2022-11-01 | End: 2022-11-02

## 2022-11-01 RX ORDER — LIDOCAINE AND PRILOCAINE 25; 25 MG/G; MG/G
CREAM TOPICAL PRN
Status: DISCONTINUED | OUTPATIENT
Start: 2022-11-01 | End: 2022-11-05 | Stop reason: HOSPADM

## 2022-11-01 RX ORDER — SODIUM CHLORIDE 9 MG/ML
500 INJECTION, SOLUTION INTRAVENOUS CONTINUOUS
Status: DISCONTINUED | OUTPATIENT
Start: 2022-11-01 | End: 2022-11-05 | Stop reason: HOSPADM

## 2022-11-01 RX ADMIN — DEXTROSE MONOHYDRATE AND SODIUM CHLORIDE: 5; .225 INJECTION, SOLUTION INTRAVENOUS at 18:01

## 2022-11-01 RX ADMIN — SODIUM CHLORIDE 1496 ML: 9 INJECTION, SOLUTION INTRAVENOUS at 16:26

## 2022-11-01 RX ADMIN — 0.12% CHLORHEXIDINE GLUCONATE 15 ML: 1.2 RINSE ORAL at 20:48

## 2022-11-01 RX ADMIN — DEXTROSE MONOHYDRATE AND SODIUM CHLORIDE: 5; .225 INJECTION, SOLUTION INTRAVENOUS at 22:54

## 2022-11-01 RX ADMIN — MIDAZOLAM 2 MG: 1 INJECTION INTRAMUSCULAR; INTRAVENOUS at 15:56

## 2022-11-01 RX ADMIN — MERCAPTOPURINE 50 MG: 50 TABLET ORAL at 23:12

## 2022-11-01 RX ADMIN — LIDOCAINE AND PRILOCAINE 1 APPLICATION: 25; 25 CREAM TOPICAL at 15:51

## 2022-11-01 RX ADMIN — METHOTREXATE 12 MG: 25 INJECTION INTRA-ARTERIAL; INTRAMUSCULAR; INTRATHECAL; INTRAVENOUS at 16:21

## 2022-11-01 RX ADMIN — IMATINIB MESYLATE 200 MG: 100 TABLET, FILM COATED ORAL at 20:03

## 2022-11-01 ASSESSMENT — PATIENT HEALTH QUESTIONNAIRE - PHQ9
SUM OF ALL RESPONSES TO PHQ9 QUESTIONS 1 AND 2: 0
2. FEELING DOWN, DEPRESSED, IRRITABLE, OR HOPELESS: NOT AT ALL
1. LITTLE INTEREST OR PLEASURE IN DOING THINGS: NOT AT ALL

## 2022-11-01 ASSESSMENT — LIFESTYLE VARIABLES
ALCOHOL_USE: NO
AVERAGE NUMBER OF DAYS PER WEEK YOU HAVE A DRINK CONTAINING ALCOHOL: 0
CONSUMPTION TOTAL: NEGATIVE
HAVE PEOPLE ANNOYED YOU BY CRITICIZING YOUR DRINKING: NO
HAVE YOU EVER FELT YOU SHOULD CUT DOWN ON YOUR DRINKING: NO
TOTAL SCORE: 0
ON A TYPICAL DAY WHEN YOU DRINK ALCOHOL HOW MANY DRINKS DO YOU HAVE: 0
EVER FELT BAD OR GUILTY ABOUT YOUR DRINKING: NO
DOES PATIENT WANT TO STOP DRINKING: NO
EVER HAD A DRINK FIRST THING IN THE MORNING TO STEADY YOUR NERVES TO GET RID OF A HANGOVER: NO
TOTAL SCORE: 0
HOW MANY TIMES IN THE PAST YEAR HAVE YOU HAD 5 OR MORE DRINKS IN A DAY: 0
TOTAL SCORE: 0

## 2022-11-01 ASSESSMENT — PAIN DESCRIPTION - PAIN TYPE
TYPE: ACUTE PAIN
TYPE: ACUTE PAIN

## 2022-11-01 ASSESSMENT — FIBROSIS 4 INDEX: FIB4 SCORE: 0.72

## 2022-11-01 NOTE — PROGRESS NOTES
GASTROENTEROLOGY CONSULTATION      Date of Admission:  8/1/2020           Reason for Consultation:   We were asked by Dr. Zambrano to evaluate this patient with dysphagia           ASSESSMENT AND RECOMMENDATIONS:   Assessment:  75 year old female with complex medical/surgical history after toupet fundoplication for hiatal hernia with complicated post-operative course (see HPI for full details) with GI anatomy consisting of a thoracic esophagogastric anastamosis, who was admitted with failure to thrive, weight loss, and weakness over the past month in the setting of worsening primarily pill dysphagia and atypical reflux symptoms. Esophagram with SBFT is suggestive of a midthoracic esophageal stricture. She would likely benefit from EGD and potential dilation, however the patient is understandably reluctant to undergo any further procedures without evaluation by Dr. Wise/Thoracic Surgery, who know her well.    With regards to her liver enzyme abnormalities, these are chronically elevated in a mild obstructive pattern, but unclear how much of her alkaline phosphatase elevation is related to bone metabolism. Her CBD is dilated of unclear etiology, although Regardless, she does not have signs/symptoms of choledocholithiasis or cholangitis, and would not be a good candidate for EUS/ERCP.     Recommendations  - please consult and discuss this case with Dr. Wise (or Thoracic Surgery service) due to her complex esophageal/thoracic history  - we will plan EGD and possible dilation once discussed with Dr. Wise  - recommend checking bone alkaline phosphatase to work up chronically elevated alkaline phosphatase  - repeat liver chemistries tomorrow  - we will continue to follow    Gastroenterology outpatient follow up recommendations: TBHALIMA    Thank you for involving us in this patient's care. Please do not hesitate to contact the GI service with any questions or concerns.     Pt care plan discussed with   "Pharmacy Chemotherapy Calculations    Patient Name: Tomas Roy \"JULY\" Estiven     Diagnosis: Ph+ Precursor B-Cell ALL    Protocol: [id number] (On Study, Arm B) - COG ID number: [id number]      Allergies: Amoxicillin       Ht 1.675 m (5' 5.95\")   Wt 75.4 kg (166 lb 3.6 oz)   BMI 26.87 kg/m²  Body surface area is 1.87 meters squared.     Labs 10/31/22 are within treatment plan parameters.      Drug Order   (Drug name, dose, route, IV Fluid & volume, frequency, number of doses) Interim Maintenance, Day 29  Previous treatment: IM D 15 10/17/22   Medication = High Dose Methotrexate PF  Base Dose = 5,000 mg/m2 = 500 mg/m2 followed by 4,500 mg/m2   Calc Dose 1: 500 mg/m2 x 1.87m2 = 935mg   Calc Dose 2: 4,500 mg/m2 x 1.87 m2 = 8415mg  Final Dose 1 = 935mg   Final Dose 2 = 8415mg  Route = IV  Fluid & Volume 1 = D5W 50mL over 30min  Fluid & Volume 2 = D5W 500mL over 23.5hrs   Do not start until urine SpG < 1.010 and pH > 7      <10% difference, OK to treat with final dose     Medication = Vincristine (ONCOVIN)  Base Dose = 1.5 mg/m2   Calc Dose: 1.5 mg/m2 x 1.88m2 = >2 mg  Final Dose = 2 mg  Route = IV  Fluid & Volume = NS 25 mL   Admin Duration = Over 5 - 10 minutes          OK to treat with MAX dose     Medication = leucovorin  Base Dose = 15mg/m2   Calc Dose: 1.5 mg/m2 x 1.87m2 = 28mg  Final Dose = 28.1mg  Route = IV  Fluid & Volume = D5W  25 mL   Admin Duration = Over 10min   Q6hrs, Start 42 hrs after MTX start time    <10% difference, ok to treat with final dose     Medication = methotrexate  Base Dose = 12mg fixed dose  Calc Dose: n/a  Final Dose = 12mg  Route = INTRATHECAL  Fluid & Volume = PF NS 6mL  Admin Duration = IT per MD       No calculation required, ok to treat with final dose     By my signature below, I confirm this process was performed independently with the BSA and all final chemotherapy dosing calculations congruent. I have reviewed the above chemotherapy order and that my calculation of the final dose " Agata, GI staff physician.    Feliciano Carbajal MD  -------------------------------------------------------------------------------------------------------------------           History of Present Illness:   Minerva Blanco is a 75 year old female with a history of atrial fibrillation, breast cancer s/p lumpectomy 2007, fibromyalgia, GERD, IBS, meniere's disease, MS and symptomatic hiatal hernia status post Toupet fundoplication 1/2016 c/b esophageal perforation with mediastinal phlegmon s/p initial proximal gastrectomy and distal esophagectomy and subsequent retrosternal gastric pull-through, spit fistula creation (subsequently taken down by Dr. Wise), left and right thoracotomies for mediastinal phlegmon excision, numerous washouts and lysis of adhesions, multiple esophageal dilations and stent placement/removals, and jejunostomy feeding tube placement (subsequently removed), amongst multiple other interventions. She was in and out of the hospital frequently for about 2 years between 2016 and 2018. She has had some other medical setbacks as well, but over the past year has been living semi independently in assisted living in her own apartment.    However, over the past month or so, she has become progressively weaker, ultimately culminating in a fall a few days back which prompted her admission to the hospital. She has had reduced PO intake over the past several weeks, related in part to a lack of appetite, but also to increased dysphagia, particularly to pills, when she experiences a sticking sensation retrosternally, often followed by heaving/spitting up of the pill and frothy liquid/secretions. This does not happen frequently with solid foods or liquids. She also has had worsening retrosternal pain associated with ingestion of spicy and acidic foods, described as a stinging sensation. GERD has been a longstanding issue for her, but worsened over the past year or so. This will sometimes prompt regurgitation  and BSA (when applicable) corroborate those calculations of the  pharmacist. Discrepancies of 10% or greater in the written dose have been addressed and documented within the EPIC Progress notes.    lEoy RalphD.           of frothy secretions, and sometimes thicker mucous. No sour taste or solid food regurgitation, but does have belching. She is on Prilosec at home, 20 mg TID. She has lost a few pounds over the last month, but still weighs more than when she moved to her most recent assisted living apartment about a year ago.    Work-up here has been notable for electrolyte derangements, and primary service suggests much of her presentation is related to dehydration and deconditioning. Nutrition has evaluated and is recommended a feeding tube for improved nutritional intake. RUQ ultrasound was obtained due to mildly elevated bilirubin at 1.9 and alkaline phosphatase of 417, and found CBD 13 mm (prior 16 mm in 2017, and increased to 13mm earlier in 2016), sludge in the gallbladder, but no visualized stones. Patient denies RUQ pain, fevers, episodes of jaundice. SBFT noted retrosternal gastric pull-up with possible midthoracic esophageal stricture.            Past Medical History:   Reviewed and edited as appropriate  Past Medical History:   Diagnosis Date     Breast cancer (H) 2007    right side - lumpectomy, chemo, and local radiation     Chronic infection     infection/wound for two years after esoph surgery     Compression fracture of spine (H)     T12-L1     Coronary artery disease 04/2018    s/p PCI with ADOLFO to proximal and mid RCA     Esophageal perforation      Fibromyalgia      GERD (gastroesophageal reflux disease)      Hiatal hernia      IBS (irritable bowel syndrome)      Meniere's disease     deaf left ear     Multiple sclerosis (H)      Noninfectious ileitis      Other chronic pain      Paroxysmal atrial fibrillation (H)             Past Surgical History:   Reviewed and edited as appropriate   Past Surgical History:   Procedure Laterality Date     APPENDECTOMY       BACK SURGERY  5/1/2015    lumbar fusion     BRONCHOSCOPY FLEXIBLE N/A 4/17/2017    Procedure: BRONCHOSCOPY FLEXIBLE;;  Surgeon: Jens Wise MD;   [facility]     C GASTROSTOMY/JEJUN TUBE      J tube for feedings     CLOSE SPIT FISTULA N/A 4/17/2017    Procedure: CLOSE SPIT FISTULA;;  Surgeon: Jens Wise MD;  [facility]     COMBINED ESOPHAGOSCOPY, GASTROSCOPY, DUODENOSCOPY (EGD), REMOVE ESOPHAGEAL STENT N/A 8/26/2016    Procedure: COMBINED ESOPHAGOSCOPY, GASTROSCOPY, DUODENOSCOPY (EGD), REMOVE ESOPHAGEAL STENT;  Surgeon: Jens Wise MD;  [facility]     COMBINED ESOPHAGOSCOPY, GASTROSCOPY, DUODENOSCOPY (EGD), REMOVE ESOPHAGEAL STENT N/A 2/12/2018    Procedure: COMBINED ESOPHAGOSCOPY, GASTROSCOPY, DUODENOSCOPY (EGD), REMOVE ESOPHAGEAL STENT;  Esophagogastroduodenoscopy With Stent Removal;  Surgeon: Jens Wise MD;  [facility]     COMBINED ESOPHAGOSCOPY, GASTROSCOPY, DUODENOSCOPY (EGD), REPLACE ESOPHAGEAL STENT N/A 8/25/2017    Procedure: COMBINED ESOPHAGOSCOPY, GASTROSCOPY, DUODENOSCOPY (EGD), REPLACE ESOPHAGEAL STENT;;  Surgeon: Jens Wise MD;  [facility]     COMBINED ESOPHAGOSCOPY, GASTROSCOPY, DUODENOSCOPY (EGD), REPLACE ESOPHAGEAL STENT N/A 9/15/2017    Procedure: COMBINED ESOPHAGOSCOPY, GASTROSCOPY, DUODENOSCOPY (EGD), REPLACE ESOPHAGEAL STENT;  Upper Endoscopy with Stent Exchange, Cauterization of Tracheosophageal Fistula, Cauterization of Chest Wound   ;  Surgeon: Jens Wise MD;  [facility]     COMBINED ESOPHAGOSCOPY, GASTROSCOPY, DUODENOSCOPY (EGD), REPLACE ESOPHAGEAL STENT N/A 10/20/2017    Procedure: COMBINED ESOPHAGOSCOPY, GASTROSCOPY, DUODENOSCOPY (EGD), REPLACE ESOPHAGEAL STENT;  Upper Endoscopy with Stent Exchange, Cauterization Tracheosphageal Fistula Tract;  Surgeon: Nalini Barreto MD;  [facility]     COMBINED ESOPHAGOSCOPY, GASTROSCOPY, DUODENOSCOPY (EGD), REPLACE ESOPHAGEAL STENT N/A 11/3/2017    Procedure: COMBINED ESOPHAGOSCOPY, GASTROSCOPY, DUODENOSCOPY (EGD), REPLACE ESOPHAGEAL STENT;  Esophagogastroduodenoscopy, Stent Revision,  Cauterization Of Traceoesophageal Fistula and stent exchange;  Surgeon: Nalini Barreto MD;  [facility]     COMBINED ESOPHAGOSCOPY, GASTROSCOPY, DUODENOSCOPY (EGD), REPLACE ESOPHAGEAL STENT N/A 11/17/2017    Procedure: COMBINED ESOPHAGOSCOPY, GASTROSCOPY, DUODENOSCOPY (EGD), REPLACE ESOPHAGEAL STENT;  Esophagogastroduodenoscopy, Esophogeal Stent Removal, Esophogeal Stent Placement ([id number] EndoMAXX), Cauterization Of Fistula Tract;  Surgeon: Nalini Barreto MD;  [facility]     CREATE SPIT FISTULA N/A 1/9/2017    Procedure: CREATE SPIT FISTULA;  Surgeon: Jens Wise MD;  [facility]     ESOPHAGECTOMY N/A 1/9/2017    Procedure: ESOPHAGECTOMY;  Surgeon: Jens Wise MD;  [facility]     ESOPHAGOSCOPY, GASTROSCOPY, DUODENOSCOPY (EGD), COMBINED N/A 4/18/2016    Procedure: COMBINED ESOPHAGOSCOPY, GASTROSCOPY, DUODENOSCOPY (EGD);  Surgeon: Alexis Barraza MD;  [facility]     ESOPHAGOSCOPY, GASTROSCOPY, DUODENOSCOPY (EGD), COMBINED N/A 4/25/2016    Procedure: COMBINED ESOPHAGOSCOPY, GASTROSCOPY, DUODENOSCOPY (EGD);  Surgeon: Alexis Barraza MD;  [facility]     ESOPHAGOSCOPY, GASTROSCOPY, DUODENOSCOPY (EGD), COMBINED N/A 5/4/2016    Procedure: COMBINED ESOPHAGOSCOPY, GASTROSCOPY, DUODENOSCOPY (EGD);  Surgeon: Alexis Barraza MD;  [facility]     ESOPHAGOSCOPY, GASTROSCOPY, DUODENOSCOPY (EGD), COMBINED N/A 5/18/2016    Procedure: COMBINED ESOPHAGOSCOPY, GASTROSCOPY, DUODENOSCOPY (EGD);  Surgeon: Alexis Barraza MD;  [facility]     ESOPHAGOSCOPY, GASTROSCOPY, DUODENOSCOPY (EGD), COMBINED N/A 6/22/2016    Procedure: COMBINED ESOPHAGOSCOPY, GASTROSCOPY, DUODENOSCOPY (EGD);  Surgeon: Alexis Barraza MD;  [facility]     ESOPHAGOSCOPY, GASTROSCOPY, DUODENOSCOPY (EGD), COMBINED N/A 7/12/2016    Procedure: COMBINED ESOPHAGOSCOPY, GASTROSCOPY, DUODENOSCOPY (EGD);  Surgeon: Jens Wise MD;  [facility]     ESOPHAGOSCOPY, GASTROSCOPY, DUODENOSCOPY (EGD), COMBINED N/A 4/21/2017    Procedure: COMBINED ESOPHAGOSCOPY, GASTROSCOPY, DUODENOSCOPY (EGD);  Esophagogastroduodenoscopy, Pharyngostomy Tube Placement ;  Surgeon: Jens Wise MD;  [facility]     ESOPHAGOSCOPY, GASTROSCOPY, DUODENOSCOPY (EGD), COMBINED N/A 5/19/2017    Procedure: COMBINED ESOPHAGOSCOPY, GASTROSCOPY, DUODENOSCOPY (EGD);  Upper Endoscopy, Irrigation and Debridement of Chest Wound ;  Surgeon: Nalini Barreto MD;  [facility]     ESOPHAGOSCOPY, GASTROSCOPY, DUODENOSCOPY (EGD), COMBINED N/A 5/23/2017    Procedure: COMBINED ESOPHAGOSCOPY, GASTROSCOPY, DUODENOSCOPY (EGD);;  Surgeon: Nalini Barreto MD;  [facility]     ESOPHAGOSCOPY, GASTROSCOPY, DUODENOSCOPY (EGD), COMBINED N/A 6/27/2017    Procedure: COMBINED ESOPHAGOSCOPY, GASTROSCOPY, DUODENOSCOPY (EGD);  Esophagogastroduodenoscopy With Removal And Replacement Of Esophageal Stent exchange. Exchange of pharyngtomy tube. Chest wound dressing change;  Surgeon: Nalini Barreto MD;  [facility]     ESOPHAGOSCOPY, GASTROSCOPY, DUODENOSCOPY (EGD), COMBINED N/A 8/4/2017    Procedure: COMBINED ESOPHAGOSCOPY, GASTROSCOPY, DUODENOSCOPY (EGD);  Esophagogastroduodenoscopy, Stent Removal and Stent Replacement. Dressing Change ;  Surgeon: Nalini Barreto MD;  [facility]     ESOPHAGOSCOPY, GASTROSCOPY, DUODENOSCOPY (EGD), COMBINED N/A 8/25/2017    Procedure: COMBINED ESOPHAGOSCOPY, GASTROSCOPY, DUODENOSCOPY (EGD);  Esophagogastroduodenoscopy,  Stent Revision,  Cauterization;  Surgeon: Jens Wise MD;  [facility]     ESOPHAGOSCOPY, GASTROSCOPY, DUODENOSCOPY (EGD), COMBINED N/A 10/2/2017    Procedure: COMBINED ESOPHAGOSCOPY, GASTROSCOPY, DUODENOSCOPY (EGD);  Esophagogastroduodenostomy, Replacement of Esophageal Stent, Cauterization of Tracheosophageal Fistula anc chest wall,   C-arm;  Surgeon: Nalini Barreto MD;  [facility]     ESOPHAGOSCOPY,  GASTROSCOPY, DUODENOSCOPY (EGD), DILATATION, COMBINED N/A 6/29/2016    Procedure: COMBINED ESOPHAGOSCOPY, GASTROSCOPY, DUODENOSCOPY (EGD), DILATATION;  Surgeon: Jens Wise MD;  [facility]     EXPLORE NECK N/A 5/19/2017    Procedure: EXPLORE NECK;;  Surgeon: Nalini Barreto MD;  [facility]     HC UGI ENDOSCOPY W TRANSENDOSCOPIC STENT PLACEMENT N/A 7/12/2016    Procedure: COMBINED ESOPHAGOSCOPY, GASTROSCOPY, DUODENOSCOPY (EGD), PLACE TRANSENDOSCOPIC ESOPHAGEAL STENT;  Surgeon: Jens Wise MD;  [facility]     HC UGI ENDOSCOPY W TRANSENDOSCOPIC STENT PLACEMENT N/A 7/22/2016    Procedure: COMBINED ESOPHAGOSCOPY, GASTROSCOPY, DUODENOSCOPY (EGD), PLACE TRANSENDOSCOPIC ESOPHAGEAL STENT;  Surgeon: Jens Wise MD;  [facility]     INCISION AND DRAINAGE CHEST WASHOUT, COMBINED N/A 5/27/2017    Procedure: COMBINED INCISION AND DRAINAGE CHEST WASHOUT;  Chest washout;  Surgeon: Nalini Barreto MD;  [facility]     INCISION AND DRAINAGE CHEST WASHOUT, COMBINED N/A 5/28/2017    Procedure: COMBINED INCISION AND DRAINAGE CHEST WASHOUT;  Chest washout;  Surgeon: Nalini Barreto MD;  [facility]     INCISION AND DRAINAGE CHEST WASHOUT, COMBINED N/A 6/9/2017    Procedure: COMBINED INCISION AND DRAINAGE CHEST WASHOUT;  Chest washout and dressing change, Esophaogastroduodenoscopy;  Surgeon: Jens Wise MD;  [facility]     INCISION AND DRAINAGE CHEST WASHOUT, COMBINED N/A 7/17/2017    Procedure: COMBINED INCISION AND DRAINAGE CHEST WASHOUT;  Incision and Drainage of right Chest Wound, Esophagogastroduodenoscopy with stent exchange. pharangostomy tube revision;  Surgeon: Jens Wise MD;  [facility]     IRRIGATION AND DEBRIDEMENT CHEST WASHOUT, COMBINED N/A 6/29/2016    Procedure: COMBINED IRRIGATION AND DEBRIDEMENT CHEST WASHOUT;  Surgeon: Jens Wise MD;  [facility]     IRRIGATION AND DEBRIDEMENT CHEST WASHOUT,  COMBINED N/A 5/23/2017    Procedure: COMBINED IRRIGATION AND DEBRIDEMENT CHEST WASHOUT;  COMBINED IRRIGATION AND DEBRIDEMENT CHEST WASHOUT,COMBINED ESOPHAGOSCOPY, GASTROSCOPY, DUODENOSCOPY (EGD) ;  Surgeon: Nalini Barreto MD;  [facility]     IRRIGATION AND DEBRIDEMENT CHEST WASHOUT, COMBINED N/A 5/24/2017    Procedure: COMBINED IRRIGATION AND DEBRIDEMENT CHEST WASHOUT;  Chest wound Washout and Dressing Change;  Surgeon: Nalini Barreto MD;  [facility]     IRRIGATION AND DEBRIDEMENT CHEST WASHOUT, COMBINED N/A 5/25/2017    Procedure: COMBINED IRRIGATION AND DEBRIDEMENT CHEST WASHOUT;  Irrigation and Debridement Chest Washout and dressing change;  Surgeon: Nalini Barreto MD;  [facility]     IRRIGATION AND DEBRIDEMENT CHEST WASHOUT, COMBINED N/A 5/26/2017    Procedure: COMBINED IRRIGATION AND DEBRIDEMENT CHEST WASHOUT;  Irrigation and Debridement Chest Washout with  dressing change;  Surgeon: Nalini Barreto MD;  [facility]     IRRIGATION AND DEBRIDEMENT CHEST WASHOUT, COMBINED N/A 5/30/2017    Procedure: COMBINED IRRIGATION AND DEBRIDEMENT CHEST WASHOUT;  Irrigation and Debridement Chest Washout , PICC line dressing change;  Surgeon: Nalini Barreto MD;  [facility]     IRRIGATION AND DEBRIDEMENT CHEST WASHOUT, COMBINED N/A 5/31/2017    Procedure: COMBINED IRRIGATION AND DEBRIDEMENT CHEST WASHOUT;  Irrigation and Debridement Chest Washout ;  Surgeon: Nalini Barreto MD;  [facility]     IRRIGATION AND DEBRIDEMENT CHEST WASHOUT, COMBINED N/A 6/1/2017    Procedure: COMBINED IRRIGATION AND DEBRIDEMENT CHEST WASHOUT;  Chest Wound Irrigation And Debridement with dressing change ;  Surgeon: Nalini Barreto MD;  [facility]     IRRIGATION AND DEBRIDEMENT CHEST WASHOUT, COMBINED N/A 6/4/2017    Procedure: COMBINED IRRIGATION AND DEBRIDEMENT CHEST WASHOUT;  COMBINED IRRIGATION AND DEBRIDEMENT CHEST WASHOUT, Esophagoscopy, Gastroscopy, Duodenoscopy (EGD) place transendoscopic esophageal stent,    Pharyngostomy and chest wall tube exchange  C-Arm;  Surgeon: Jens Wise MD;  [facility]     IRRIGATION AND DEBRIDEMENT CHEST WASHOUT, COMBINED N/A 6/2/2017    Procedure: COMBINED IRRIGATION AND DEBRIDEMENT CHEST WASHOUT;  Chest Wound Irrigation and Debridement, Dressing Change;  Surgeon: Nalini Barreto MD;  [facility]     LAPAROSCOPIC ASSISTED INSERTION TUBE JEJUNOSTOMY N/A 4/17/2017    Procedure: LAPAROSCOPIC ASSISTED INSERTION TUBE JEJUNOSTOMY;;  Surgeon: Jens Wise MD;  [facility]     LAPAROSCOPY DIAGNOSTIC (GENERAL) N/A 1/9/2017    Procedure: LAPAROSCOPY DIAGNOSTIC (GENERAL);  Surgeon: Jens Wise MD;  [facility]     LAPAROSCOPY DIAGNOSTIC (GENERAL) N/A 4/17/2017    Procedure: LAPAROSCOPY DIAGNOSTIC (GENERAL);  Laparoscopic , Neck Dissection, Spit Fistula Takedown, Laparoscopic Jejunostomy Tube and Pharyngostomy Tube, Gastric Pull up, Upper Endoscopy(EGD)  , Flexible Bronchoscopy ,Sternotomy ;  Surgeon: Jens Wise MD;  [facility]     LUMPECTOMY BREAST Right 2007     NERVE BLOCK PERIPHERAL N/A 8/30/2016    Procedure: NERVE BLOCK PERIPHERAL;  Surgeon: GENERIC ANESTHESIA PROVIDER;  [facility]: [facility]     NISSEN FUNDOPLICATION  1/2016     PHARYNGOSTOMY N/A 4/18/2016    Procedure: PHARYNGOSTOMY;  Surgeon: Alexis Barraza MD;  [facility]     PHARYNGOSTOMY N/A 4/25/2016    Procedure: PHARYNGOSTOMY;  Surgeon: Alexis Barraza MD;  [facility]     PHARYNGOSTOMY N/A 5/4/2016    Procedure: PHARYNGOSTOMY;  Surgeon: Alexis Barraza MD;  [facility]     PHARYNGOSTOMY N/A 6/22/2016    Procedure: PHARYNGOSTOMY;  Surgeon: Alexis Barraza MD;  [facility]     PHARYNGOSTOMY N/A 6/29/2016    Procedure: PHARYNGOSTOMY;  Surgeon: Jens Wise MD;  [facility]     PHARYNGOSTOMY N/A 4/21/2017    Procedure: PHARYNGOSTOMY;;  Surgeon: Jens Wise MD;  Location:  OR  "    PHARYNGOSTOMY Left 2017    Procedure: PHARYNGOSTOMY;;  Surgeon: Nalini Barreto MD;  [facility]     PICC INSERTION Left 2016    5fr DL BioFlo PICC, 42cm (3cm external) in the L medial brachial vein w/ tip in the SVC RA junction.     PICC INSERTION - Rewire Right 2017    5fr DL BioFlo PICC, 40cm (6cm external) in the R medial brachial vein w/ tip in the SVC RA junction.     REPAIR FISTULA TRACHEOESOPHAGEAL N/A 9/15/2017    Procedure: REPAIR FISTULA TRACHEOESOPHAGEAL;;  Surgeon: Jens Wise MD;  [facility]     THORACOTOMY Right     lung infection - \"hard crust formed on lung\"     THORACOTOMY Left 2017    Procedure: THORACOTOMY;  Surgeon: Jens Wise MD;  [facility]     WRIST SURGERY              Previous Endoscopy:   No results found for this or any previous visit.         Social History:   Reviewed and edited as appropriate  Social History     Socioeconomic History     Marital status:      Spouse name: neeru     Number of children: 2     Years of education: Not on file     Highest education level: Not on file   Occupational History     Occupation: retired    Social Needs     Financial resource strain: Not on file     Food insecurity     Worry: Not on file     Inability: Not on file     Transportation needs     Medical: Not on file     Non-medical: Not on file   Tobacco Use     Smoking status: Former Smoker     Packs/day: 1.00     Years: 15.00     Pack years: 15.00     Types: Cigarettes     Last attempt to quit: 1998     Years since quittin.6     Smokeless tobacco: Never Used   Substance and Sexual Activity     Alcohol use: No     Alcohol/week: 0.0 standard drinks     Drug use: No     Sexual activity: Not on file   Lifestyle     Physical activity     Days per week: Not on file     Minutes per session: Not on file     Stress: Not on file   Relationships     Social connections     Talks on phone: Not on file     " Gets together: Not on file     Attends Orthodoxy service: Not on file     Active member of club or organization: Not on file     Attends meetings of clubs or organizations: Not on file     Relationship status: Not on file     Intimate partner violence     Fear of current or ex partner: Not on file     Emotionally abused: Not on file     Physically abused: Not on file     Forced sexual activity: Not on file   Other Topics Concern     Not on file   Social History Narrative    Retired realtor.  Lives with  Richard. 2 children alive and well.            Family History:   Reviewed and edited as appropriate  Family History   Problem Relation Age of Onset     Alzheimer Disease Mother      Cerebrovascular Disease Father         cerebral hemorrhage     Ovarian Cancer Sister      Breast Cancer Daughter      No known history of colorectal cancer, liver disease, or inflammatory bowel disease.         Allergies:   Reviewed and edited as appropriate     Allergies   Allergen Reactions     Amoxicillin Diarrhea     Ativan [Lorazepam] Other (See Comments)     Hallucinations     Morphine Itching            Medications:     Current Facility-Administered Medications   Medication     acetaminophen (TYLENOL) tablet 650 mg     aspirin EC tablet 81 mg     calcium carbonate (TUMS) chewable tablet 500-1,000 mg     enoxaparin ANTICOAGULANT (LOVENOX) injection 30 mg     gabapentin (NEURONTIN) capsule 400 mg     lidocaine (LMX4) cream     lidocaine 1 % 0.1-1 mL     loperamide (IMODIUM) capsule 2 mg     magnesium sulfate 2 g in water intermittent infusion     magnesium sulfate 4 g in 100 mL sterile water (premade)     melatonin tablet 1 mg     metoprolol succinate (TOPROL-XL) half-tab 12.5 mg     multivitamin w/minerals (THERA-VIT-M) tablet 1 tablet     naloxone (NARCAN) injection 0.1-0.4 mg     omeprazole (priLOSEC) CR capsule 20 mg     ondansetron (ZOFRAN-ODT) ODT tab 4 mg    Or     ondansetron (ZOFRAN) injection 4 mg     oxyCODONE  (ROXICODONE) tablet 5 mg     potassium chloride (KLOR-CON) Packet 20-40 mEq     potassium chloride 10 mEq in 100 mL intermittent infusion with 10 mg lidocaine     potassium chloride 10 mEq in 100 mL sterile water intermittent infusion (premix)     potassium chloride 20 mEq in 50 mL intermittent infusion     potassium chloride ER (KLOR-CON M) CR tablet 20-40 mEq     simethicone (MYLICON) chewable tablet 80 mg     sodium chloride (PF) 0.9% PF flush 3 mL     sodium chloride (PF) 0.9% PF flush 3 mL             Review of Systems:     A complete 10 point review of systems was performed and is negative except as noted in the HPI           Physical Exam:   /82 (BP Location: Left arm)   Pulse 92   Temp 96.3  F (35.7  C) (Oral)   Resp 16   Ht 1.524 m (5')   Wt 42.1 kg (92 lb 14.4 oz)   SpO2 97%   BMI 18.14 kg/m    Wt:   Wt Readings from Last 2 Encounters:   08/02/20 42.1 kg (92 lb 14.4 oz)   06/23/19 48.9 kg (107 lb 12.8 oz)      Constitutional: No acute distress, resting comfortably in bed, frail appearing  Eyes: Sclera anicteric  Ears/nose/mouth/throat: Moist mucus membranes  Neck: supple  CV: No edema  Respiratory: Breathing comfortably on room air  Abd: Soft, nontender, nondistended, bowel sounds present  Skin: numerous thoracic and abdominal scars including 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  Neuro: AAO x 3  Psych: Mildly anxious, tearful at times  MSK: Sarcopenic         Data:   Labs and imaging below were independently reviewed and interpreted    BMP  Recent Labs   Lab 08/03/20  1550 08/02/20  0454 08/02/20  0139 08/01/20  1747 08/01/20  1218 08/01/20  1051   NA  --  138  --   --  136 130*   POTASSIUM 4.0 4.6 6.0* 2.9* 2.5* 8.1*   CHLORIDE  --  106  --   --  93* 93*   ANNABELLE  --  8.6  --   --  8.5 8.5   CO2  --  23  --   --  29 27   BUN  --  9  --   --  11 12   CR  --  0.60  --   --  0.69 Canceled, Test credited   GLC  --  62*  --   --  52* 52*     CBC  Recent Labs   Lab 08/02/20  045  08/01/20  1051   WBC 7.1 9.3   RBC 2.99* 2.83*   HGB 12.1 11.2*   HCT 37.0 32.0*   * 113*   MCH 40.5* 39.6*   MCHC 32.7 35.0   RDW 17.0* 16.1*    228     INR  Recent Labs   Lab 08/01/20  1051   INR 1.10     LFTs  Recent Labs   Lab 08/02/20  0454 08/01/20  1218 08/01/20  1051   ALKPHOS 417* 408* 386*   * 76* Canceled, Test credited   ALT 76* 65* 83*   BILITOTAL 1.9* 1.9* 2.5*   PROTTOTAL 6.7* 6.6* 7.1   ALBUMIN 2.6* 2.5* 2.4*      PANC  Recent Labs   Lab 08/01/20  1051   LIPASE <10*       Imaging: As above

## 2022-11-01 NOTE — PROCEDURES
Pediatric Oncology Lumbar Puncture  Procedure Note      Patient Name:  Tomas Jean-Baptiste  : 2000   MRN: [id number]    Service Location:  [facility]  Date of Service: 2022  Time: 4:20 PM    Procedure Performed By: Pepe Faye M.D.    Pre-procedural Diagnosis:  Ph +Acute B-Lymphoblastic Leukemia having achieved remission    Post-procedural Diagnosis: Ph+ Acute B-Lymphoblastic Leukemia having achieved remission    Procedure:  Lumbar Puncture with administration of intrathecal chemotherapy    Anxiolysis: Versed 2 mg IV    Analgesia: Emla cream    Intrathecal Chemotherapy:  Yes    Chemotherapy Administered:  Methotrexate 12 mg IT (in 6 mL NS)    Needle Size:  22 gauge, 3 in  Site: L3-L4  Number of Attempts: 2  Fluid:  6 ml clear fluid obtained  Labs: Cell count, cytology    Complications:  None    Procedure Note:    Tomas Jean-Baptiste is a 22 y.o. 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  He presents today directly to the floor for scheduled chemotherapy for [id number], AR Arm B, Interim Maintenance, Day 29 and scheduled lumbar puncture with intrathecal chemotherapy.  Prior to the procedure, the risks and benefits were discussed with the patient.  Consent for the procedure was signed by the patient himself and placed in the patient's chart.  All pertinent labs and history were reviewed and a complete History and Physical Examination were performed and placed in the medical record.  Tomas Roy remained in his hospital room where the procedure was performed.  All necessary safety equipment per ASA guidelines were available.   Tomas Roy was identified by name,  and medical record number.  Gloves and mask were worn during the entire procedure.  Tomas Roy was prepped and draped in the usual sterile fashion with povoiodine.  He was positioned in the left decubitus position and  all landmarks including superior posterior iliac crest, umbilicus and vertebral bodies were identified by palpation.  A 3 in, 22 gauge spinal needle was introduced into the L4-L5 spinal interspace without resulting CSF, no trauma or blood.  A second attempt was made at L3-L4 spinal interspace which resulted in free-flowing spinal fluid.  6 ml of clear fluid were obtained and sent for cell count and cytology.  Methotrexate 12 mg in 6 mL of NS was verified with the nurse bedside and administered into the spinal fluid. Tomas Roy tolerated the procedure without complication or bleeding.      Pepe Faye MD  Pediatric Hematology / Oncology  [facility]  Cell.  [phone]

## 2022-11-01 NOTE — H&P
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 pretreatment              - 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              - Testicular status NEGATIVE                   - Flow cytometry of both 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, FISH positive for BCR-ABL1 translocation              - [place] [facility] [id number]              - Initially [place] on [facility] [place] [id number] - but 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                            - [place] [facility] [place] [id number] and began study 6/13/2022              - Started imatinib therapy 6/3/2022 (split dosing of imatinib 400 mg PO QAM and 200 mg PO QPM) - continued at Day 15 of Induction 1A with imatinib 600 mg PO daily - remains 100% compliant with imatinib              - End of Induction IA and IB MRD negative                         - WBC 1.9, Hgb 11.2, platelets 118  - ,   - Creatinine 0.64 (baseline 0.5-0.6)  - AST 39, ALT 94, bilirubin total 0.3                  - No  acute dose-limiting toxicities.  , platelets 118 and in meeting with criteria to proceed with Day 29 high-dose methotrexate with intrathecal methotrexate.  No mucositis.  No Bactrim in the past 72 hours, no NSAIDs in the past 72 hours.                  - Will admit for Day 29 of Interim Maintenance                [id number], AR Arm B, Interim Maintenance, Day 29:      ** Methotrexate 12 mg IT x1 dose (see separate procedure note)             ** Vincristine 2 mg IV x 1 dose on Day 29 (TODAY)              ** Methotrexate 935 IV over 30 minutes on Day 29(TODAY)              ** Methotrexate 8415 mg IV Over 24.5 hours starting on Day 15 (TODAY)              ** Leucovorin 28 mg IV Q6H starting at Hour 42               ** Mercaptopurine 50 mg = 1 tab PO  x 6 days of the week, 25 mg = 0.5 tabs x 1 day/week)               ** Took Imatinib 400 mg PO this AM - will administer an addition 200 mg dose this evening (no splitting tabs while in hospital)  ** Starting tomorrow will administer Imatinib 600 mg PO QAM until discharged      - IV fluids per protocol, prehydration x 2 hours and until urine specific gravity < 1.010 and pH > 7   - D5 1/4 NS + 30 mEq sodium bicarbonate at 230 ml/hr (=125 ml/m2/hr)   - Will monitor MTX levels at 24hr, (36 hr if indicated) 42hr, 48hr then daily until MTX <0.1 uM                2) Chemotherapy Related Pancytopenia:  - WBC 1.9, Hgb 11.2, platelets 118  - ,   - No transfusion indicated     3) Sixth Cranial Nerve Palsy (IMPROVED/RESOLVED):             - Followed by Dr. Carranza             - Improvement/Resolution of palsy,     4) At Risk of Opportunistic Lung Infection:             - Holding Bactrim this past weekend, can resume 72 hours after clearance of methotrexate     5) Anxiety (IMPROVED GREATLY):     6) Social:             - Continue with support             - Continue school as tolerated     7) Access:               - R Port-A-Cath in place      8) Research  Participant:              [employer] - Source Data         Diagnosis: Ph+ Precursor B-Cell Acute Lymphoblastic Leukemia     Disease Status: EOI1A MRD NEGATIVE, EOI1B RD NEGATIVE, CNS3c, testicular negative, HSV1 IgG POSITIVE, CMV IgG NEGATIVE, VARICELLA IgG POSITIVE     Active Studies: [id number], [id number]                                                                                                      Inactive Studies: [id number]                                                                                                                                                Optional Studies: None             Protocol: International Phase 3 trial in [place] 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      Treatment Plan: [id number](OS), AR-Arm B, Interim Maintenance, Day 29     Height: 1.680 m      Weight: 74.8 kg       BSA: 1.87 m²   (Start of Interim Maintenance 9/29/2022)                                                                                                                                           Performance Status: Karnofsky 100, ECOG  0 (updated 11/1/2022)     Treatment Plan Medications:  (verified 100% compliance)     Currently taking imatinib 400 mg PO QAM and 250 mg QPM  Currently taking mercaptopurine 1 tablet PO QHS Mon-Sat and 0.5 tablet PO QHS Sun     Evaluations / Study Labs:  (11/1/2022)     WBC 1.9, Hgb 11.2, .4, platelet count 118  ,   Creatinine slightly above baseline 0.64 (baseline 0.5-0.6)  AST 39, ALT 94 (2x ULN)  Total Bilirubin 0.3     Therapy Given: (11/1/2022)    Methotrexate 12 mg IT x1 dose vincristine 2 mg IV (ADMINISTERED)  Vincristine 2 mg IV (TO BE GIVEN)  Methotrexate 9350 mg IV (5 g/m²)  (TO BE GIVEN)  Mercaptopurine 1 tablet PO daily x 6 days and 0.5 tablets x 1 day (CONTINUED)  Imatinib 600 mg PO daily (CONTINUED)         Disposition:  Admit to inpatient for High-dose Methotrexate, discharge pending clearance of methotrexate    Pepe Faye MD  Pediatric Hematology / Oncology  [facility]  Cell.  [phone]  [facility]. [phone]

## 2022-11-01 NOTE — LETTER
Physician Notification of Discharge    Patient name: Tomas Jean-Baptiste     : 2000     MRN: [id number]    Discharge Date/Time: 2022  1:46 PM    Discharge Disposition: Discharged to home/self care ()    Discharge DX: There are no discharge diagnoses documented for the most recent discharge.    Discharge Meds:      Medication List      CHANGE how you take these medications      Instructions   mercaptopurine 50 MG Tabs  What changed:   · how much to take  · how to take this  · when to take this  · additional instructions  Commonly known as: PURINETHOL   Doctor's comments: Meds to Beds please.  Take 1 tablet by mouth Monday - Saturday.  Take 0.5 tablets by mouth on .        CONTINUE taking these medications      Instructions   chlorhexidine 0.12 % Soln  Commonly known as: PERIDEX   Swish and spit 15 mL by mouth 2 times a day.  Dose: 15 mL     * imatinib 400 MG tablet  Commonly known as: Gleevec   TAKE 1 TABLET BY MOUTH  DAILY WITH TWO 100MG (600MG TOTAL DOSE)     * imatinib 100 MG tablet  Commonly known as: Gleevec   TAKE 2 TABLETS BY MOUTH  DAILY (WITH ONE 400MG TOTAL DOSE 600MG)     ondansetron 8 MG Tbdp  Commonly known as: Zofran ODT   Dissovle 1 Tablet by mouth every 8 hours as needed for Nausea.  Dose: 8 mg     sulfamethoxazole-trimethoprim 400-80 MG Tabs  Commonly known as: BACTRIM   Take 2 tablets by mouth twice daily every Saturday and      therapeutic multivitamin-minerals Tabs   Take 1 Tab by mouth every day.  Dose: 1 Tablet     vitamin D3 1000 Unit (25 mcg) Tabs  Commonly known as: cholecalciferol   Take 1,000 Units by mouth every day.  Dose: 1,000 Units         * This list has 2 medication(s) that are the same as other medications prescribed for you. Read the directions carefully, and ask your doctor or other care provider to review them with you.              Attending Provider: No att. providers found    [facility] Pediatrics Department    PCP: Margarito Arvizu M.D.    To speak with a member of the patients care team, please contact the [facility] Pediatric department -at [phone].   Thank you for allowing us to participate in the care of your patient.

## 2022-11-01 NOTE — PROGRESS NOTES
"Pharmacy Chemotherapy Calculations Note:    Dx: B-ALL, PH+    Cycle: Interim Maintenance Day 29   On Study - COG ID number: [id number]  Previous treatment: IM Day 15 on 10/17/22    Protocol: HD MTX Interim Maintenance SR Arm B (Investigational COG Arm) as per [id number]          /71   Pulse 77   Temp 36.9 °C (98.5 °F) (Temporal)   Resp 16   Ht 1.675 m (5' 5.95\")   Wt 75.4 kg (166 lb 3.6 oz)   SpO2 96%   BMI 26.87 kg/m²  Body surface area is 1.87 meters squared.  IM Dosing Values:   Ht 168 cm Wt 74.8 kg BSA 1.87 m²  Labs from 10/31/22:  ANC = 820  PLT = 118  Hgb = 11.2  SCr = 0.64  CrCl = >125 mL/min (using min SCr 0.7 mg/dL and current weight)  LFT = 39/94/70 TBili = 0.3    Methotrexate age-based dosing >3 y/o = 12 mg    <10% difference, ok to treat with final written dose = 12 mg IT    VinCRIStine (Oncovin) 1.5 mg/m² x 1.87 m² = 2.8 mg   Max 2 mg, okay to treat with final dose = 2 mg dose capped    Do not start until urine SpG < 1.010 and pH > 7  HD Methotrexate 5000 mg/m² given as:     Methotrexate 500 mg/m² x 1.87 m² = 935 mg   <10% difference, okay to treat with final dose = 935 mg IV over 30 min     Methotrexate 4500 mg/m² x 1.87 m² = 8415 mg   <10% difference, okay to treat with final dose = 8415 mg IV over 23.5 hrs    Leucovorin 15 mg/m² x 1.87 m² = 28 mg   <10% difference, okay to treat with final dose = 28.1 mg IV starting at hour 42 after start of MTX infusion      Ubaldo Rodriguez, PharmD, BCPS    "

## 2022-11-01 NOTE — LETTER
Physician Notification of Admission      To: Margarito Arvizu M.D.    [address] [facility] [address]  [facility] [address]    From: Pepe Faye M.D.    Re: Tomas KAT Jean-Baptiste, 2000    Admitted on: 11/1/2022  3:12 PM    Admitting Diagnosis:    [place] chromosome positive acute lymphoblastic leukemia (ALL) ([facility]) [C91.00]    Dear Margarito Arvizu M.D.,      Our records indicate that we have admitted a patient to [facility] Pediatrics department who has listed you as their primary care provider, and we wanted to make sure you were aware of this admission. We strive to improve patient care by facilitating active communication with our medical colleagues from around the region.    To speak with a member of the patients care team, please contact the [facility] Pediatric department at [phone].   Thank you for allowing us to participate in the care of your patient.

## 2022-11-02 LAB
ANION GAP SERPL CALC-SCNC: 9 MMOL/L (ref 7–16)
APPEARANCE UR: CLEAR
BACTERIA #/AREA URNS HPF: NEGATIVE /HPF
BACTERIA #/AREA URNS HPF: NEGATIVE /HPF
BILIRUB UR QL STRIP.AUTO: NEGATIVE
BUN SERPL-MCNC: 7 MG/DL (ref 8–22)
CALCIUM SERPL-MCNC: 8 MG/DL (ref 8.5–10.5)
CHLORIDE SERPL-SCNC: 107 MMOL/L (ref 96–112)
CO2 SERPL-SCNC: 23 MMOL/L (ref 20–33)
COLOR UR: YELLOW
CREAT SERPL-MCNC: 0.65 MG/DL (ref 0.5–1.4)
EPI CELLS #/AREA URNS HPF: NEGATIVE /HPF
EPI CELLS #/AREA URNS HPF: NEGATIVE /HPF
GFR SERPLBLD CREATININE-BSD FMLA CKD-EPI: 137 ML/MIN/1.73 M 2
GLUCOSE SERPL-MCNC: 235 MG/DL (ref 65–99)
GLUCOSE UR STRIP.AUTO-MCNC: NEGATIVE MG/DL
HYALINE CASTS #/AREA URNS LPF: ABNORMAL /LPF
HYALINE CASTS #/AREA URNS LPF: ABNORMAL /LPF
KETONES UR STRIP.AUTO-MCNC: NEGATIVE MG/DL
LEUKOCYTE ESTERASE UR QL STRIP.AUTO: NEGATIVE
MICRO URNS: ABNORMAL
MICRO URNS: NORMAL
NITRITE UR QL STRIP.AUTO: NEGATIVE
PH UR STRIP.AUTO: 6 [PH] (ref 5–8)
PH UR STRIP.AUTO: 7 [PH] (ref 5–8)
PH UR STRIP.AUTO: 7 [PH] (ref 5–8)
PH UR STRIP.AUTO: 8 [PH] (ref 5–8)
POTASSIUM SERPL-SCNC: 3.6 MMOL/L (ref 3.6–5.5)
PROT UR QL STRIP: 30 MG/DL
PROT UR QL STRIP: NEGATIVE MG/DL
RBC # URNS HPF: ABNORMAL /HPF
RBC # URNS HPF: ABNORMAL /HPF
RBC UR QL AUTO: NEGATIVE
SODIUM SERPL-SCNC: 139 MMOL/L (ref 135–145)
SP GR UR STRIP.AUTO: 1.01
SP GR UR STRIP.AUTO: <=1.005
UROBILINOGEN UR STRIP.AUTO-MCNC: 0.2 MG/DL
WBC #/AREA URNS HPF: ABNORMAL /HPF
WBC #/AREA URNS HPF: ABNORMAL /HPF

## 2022-11-02 PROCEDURE — A9270 NON-COVERED ITEM OR SERVICE: HCPCS | Performed by: PEDIATRICS

## 2022-11-02 PROCEDURE — 81003 URINALYSIS AUTO W/O SCOPE: CPT | Mod: 91

## 2022-11-02 PROCEDURE — 700102 HCHG RX REV CODE 250 W/ 637 OVERRIDE(OP): Performed by: PEDIATRICS

## 2022-11-02 PROCEDURE — 700111 HCHG RX REV CODE 636 W/ 250 OVERRIDE (IP): Performed by: PEDIATRICS

## 2022-11-02 PROCEDURE — 99232 SBSQ HOSP IP/OBS MODERATE 35: CPT | Performed by: PEDIATRICS

## 2022-11-02 PROCEDURE — 81001 URINALYSIS AUTO W/SCOPE: CPT | Mod: 91

## 2022-11-02 PROCEDURE — 80048 BASIC METABOLIC PNL TOTAL CA: CPT

## 2022-11-02 PROCEDURE — 700105 HCHG RX REV CODE 258: Performed by: PEDIATRICS

## 2022-11-02 PROCEDURE — 770000 HCHG ROOM/CARE - INTERMEDIATE ICU *

## 2022-11-02 RX ADMIN — ONDANSETRON 8 MG: 2 INJECTION INTRAMUSCULAR; INTRAVENOUS at 00:01

## 2022-11-02 RX ADMIN — DEXTROSE MONOHYDRATE AND SODIUM CHLORIDE: 5; .225 INJECTION, SOLUTION INTRAVENOUS at 09:43

## 2022-11-02 RX ADMIN — 0.12% CHLORHEXIDINE GLUCONATE 15 ML: 1.2 RINSE ORAL at 08:34

## 2022-11-02 RX ADMIN — DEXTROSE MONOHYDRATE AND SODIUM CHLORIDE: 5; .225 INJECTION, SOLUTION INTRAVENOUS at 03:31

## 2022-11-02 RX ADMIN — 0.12% CHLORHEXIDINE GLUCONATE 15 ML: 1.2 RINSE ORAL at 19:52

## 2022-11-02 RX ADMIN — DEXTROSE MONOHYDRATE AND SODIUM CHLORIDE: 5; .225 INJECTION, SOLUTION INTRAVENOUS at 19:01

## 2022-11-02 RX ADMIN — DEXTROSE MONOHYDRATE AND SODIUM CHLORIDE: 5; .225 INJECTION, SOLUTION INTRAVENOUS at 14:24

## 2022-11-02 RX ADMIN — METHOTREXATE: 25 INJECTION INTRA-ARTERIAL; INTRAMUSCULAR; INTRATHECAL; INTRAVENOUS at 12:24

## 2022-11-02 RX ADMIN — VINCRISTINE SULFATE 2 MG: 1 INJECTION, SOLUTION INTRAVENOUS at 10:44

## 2022-11-02 RX ADMIN — DEXTROSE MONOHYDRATE AND SODIUM CHLORIDE: 5; .225 INJECTION, SOLUTION INTRAVENOUS at 23:38

## 2022-11-02 RX ADMIN — ONDANSETRON 8 MG: 2 INJECTION INTRAMUSCULAR; INTRAVENOUS at 23:37

## 2022-11-02 RX ADMIN — IMATINIB MESYLATE 600 MG: 400 TABLET, FILM COATED ORAL at 08:34

## 2022-11-02 RX ADMIN — METHOTREXATE: 25 INJECTION, SOLUTION INTRA-ARTERIAL; INTRAMUSCULAR; INTRATHECAL; INTRAVENOUS at 11:25

## 2022-11-02 RX ADMIN — MERCAPTOPURINE 50 MG: 50 TABLET ORAL at 08:34

## 2022-11-02 RX ADMIN — ONDANSETRON 8 MG: 2 INJECTION INTRAMUSCULAR; INTRAVENOUS at 08:34

## 2022-11-02 RX ADMIN — ONDANSETRON 8 MG: 2 INJECTION INTRAMUSCULAR; INTRAVENOUS at 16:36

## 2022-11-02 ASSESSMENT — PAIN DESCRIPTION - PAIN TYPE
TYPE: ACUTE PAIN

## 2022-11-02 ASSESSMENT — FIBROSIS 4 INDEX: FIB4 SCORE: 0.64

## 2022-11-02 NOTE — PROGRESS NOTES
Pt demonstrates ability to turn self in bed without assistance of staff. Patient understands importance in prevention of skin breakdown, ulcers, and potential infection. Hourly rounding in effect. RN skin check complete.   Devices in place include: R chest port.  Skin assessed under devices: Yes.  Confirmed HAPI identified on the following date: NA   Location of HAPI: NA.  Wound Care RN following: No.  The following interventions are in place: Skin checked with each assessment, devices repositioned as needed.

## 2022-11-02 NOTE — PROGRESS NOTES
1510- Pt admitted to peds for planned admission for chemotherapy administration. Port accessed using a 20g 1 inch trevino needle with 1 attempt. No labs currently required at this time per MD as labs drawn and verified on 10/31. MD notified of pt arrival.  Urine sent for pH and sp gravity.    1620-LP performed by MD at bedside. MD notified of urine results and instructed to begin with NS bolus first and draw CMP. Bolus initiated.

## 2022-11-02 NOTE — PROGRESS NOTES
Pediatric Hematology/Oncology  Daily Progress Note      Patient Name:  Tomas Jean-Baptiste  : 2000  MRN: [id number]    Location of Service:  Inpatient Pediatrics  Date of Service: 2022  Time: 4:50 PM    Hospital Day: 2    Patient Active Problem List   Diagnosis    Flat feet    Bilateral anterior knee pain    Bilateral ankle joint pain    Chronic midline thoracic back pain    Family history of diabetes mellitus    Callus of foot    PE (pulmonary thromboembolism) ([facility])    Superficial vein thrombosis    Left abducens nerve palsy    Myopia of both eyes    Acquired pancytopenia ([facility])    B lymphoblastic leukemia with t(9;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  IV hydration was continued overnight and Cr is down to 0.65 mg/dL this morning.  Day 29 chemo will start today.    JULY has no new complaints or concerns.    Review of Systems:     Constitutional: Afebrile. Normal  appetite.  HENT: Negative for URI symptoms, mouth sores.  Eyes: Negative for visual changes.  Respiratory: Negative for shortness of breath or cough.    Gastrointestinal: Mild nausea; no vomiting, abdominal pain, diarrhea, constipation.    Skin: Negative for rash or skin infection..  Neurological: Negative for numbness, tingling, sensory changes, weakness or headaches.    Psychiatric/Behavioral: Ready to go!     Medications:    Current Facility-Administered Medications:     chlorhexidine (PERIDEX) 0.12 % solution 15 mL, 15 mL, Mouth/Throat, BID, Pepe Faye M.D., 15 mL at 22 0834    mercaptopurine (PURINETHOL) split tablet 50 mg, 50 mg, Oral, Once per day on Mon Tue Wed Thu Fri Sat, Pepe Faye M.D., 50 mg at 22 0834    sodium bicarbonate 8.4 % 30 mEq in D5 1/4 NS 1,000 mL, , Intravenous, Continuous, Pepe  "ELENA Faye M.D., Last Rate: 230 mL/hr at 22 1424, New Bag at 22 1424    sodium bicarbonate 8.4 % injection 46.8 mEq, 25 mEq/m2 (Treatment Plan Recorded), Intravenous, PRN, Pepe Faye M.D.    ondansetron (ZOFRAN) syringe/vial injection 8 mg, 8 mg, Intravenous, Q8HRS, Pepe Faye M.D., 8 mg at 22 1636    lidocaine-prilocaine (EMLA) 2.5-2.5 % cream, , Topical, PRN, Pepe Faye M.D., 1 Application at 22 1551    promethazine (PHENERGAN) 6.25 MG/5ML syrup 18.75 mg, 0.25 mg/kg (Treatment Plan Recorded), Oral, Q6HRS PRN, Pepe Faye M.D.    ondansetron (ZOFRAN) syringe/vial injection 8 mg, 8 mg, Intravenous, Q8HRS PRN, Pepe Faye M.D.    NS infusion 500 mL, 500 mL, Intravenous, Continuous, Pepe Faye M.D., Held at 22 1545    [START ON 2022] mercaptopurine (PURINETHOL) split tablet 25 mg, 25 mg, Oral, Q , Pepe Faye M.D.    methotrexate PF 8,415 mg in dextrose 5% 500 mL HIGH DOSE Chemotherapy Infusion (PEDS ONC), , Intravenous, Once, Pepe Faye M.D., Last Rate: 21.3 mL/hr at 22 1224, New Bag at 22 1224    [START ON 11/3/2022] leucovorin 28.1 mg in dextrose 5% 25 mL IVPB (PEDS), 15 mg/m2 (Treatment Plan Recorded), Intravenous, Q6HR, Pepe Faye M.D.    imatinib (Gleevec) tablet 600 mg, 600 mg, Oral, DAILY, Pepe Faye M.D., 600 mg at 22 0834    LORazepam (Ativan) 1 mg/mL oral suspension (NICU/PEDS) 1 mg, 1 mg, Oral, Q6HRS PRN, Pepe Faye M.D.    OBJECTIVE:     Max Temp: Temp (24hrs), Av.6 °C (97.8 °F), Min:36.2 °C (97.2 °F), Max:36.9 °C (98.4 °F)      Vitals: /78   Pulse 84   Temp 36.8 °C (98.2 °F) (Temporal)   Resp 18   Ht 1.675 m (5' 5.95\")   Wt 77.3 kg (170 lb 6.7 oz)   SpO2 98%   BMI 27.55 kg/m²       Intake/Output Summary (Last 24 hours) at 2022 1650  Last data filed at 2022 1224  Gross per 24 hour   Intake 2516.17 ml   Output 1075 ml   Net 1441.17 ml       Today's weight is 77.3 kg, up " "1.9 kg from yesterday.    Labs:   Latest Reference Range & Units 11/1/22 16:27 11/2/22 06:35   Sodium 135 - 145 mmol/L 139 139   Potassium 3.6 - 5.5 mmol/L 4.0 3.6   Chloride 96 - 112 mmol/L 104 107   Co2 20 - 33 mmol/L 22 23   Anion Gap 7.0 - 16.0  13.0 9.0   Glucose 65 - 99 mg/dL 110 (H) 235 (H)   Bun 8 - 22 mg/dL 9 7 (L)   Creatinine 0.50 - 1.40 mg/dL 0.82 0.65   GFR (CKD-EPI) >60 mL/min/1.73 m 2 128 137   Calcium 8.5 - 10.5 mg/dL 9.1 8.0 (L)       Physical Exam:    Constitutional: Alert, pleasant, NAD.   HENT: Normocephalic and atraumatic. Partial alopecia.  No rhinorrhea. Oropharynx is clear and moist.   Eyes: Conjunctivae are normal. No scleral icterus.   Neck: Supple, no adenopathy.    Cardiovascular: RRR w/o murmur.  Pulmonary/Chest: Effort normal. Symmetric expansion.  Clear to auscultation bilaterally.  Abdomen: Soft. Bowel sounds are normal. No distension, organomegaly or mass.     Neurological: Alert and oriented to person and place. Exhibits normal muscle tone bilaterally in upper and lower extremities. Coordination grossly normal.    Skin: Skin is warm, dry and pink.  No rash or evidence of skin infection. Port site clean and dry, accessed.     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     1) COG protocol [id number], AR Arm B, Interim Maintenance, Day 29:      ** Methotrexate 12 mg IT - COMPLETE yesterday            ** Vincristine 2 mg IV x 1 dose on Day 29 (TODAY)              ** Methotrexate 935 IV over 30 minutes on Day 29 (TODAY)              ** Methotrexate 8415 mg IV Over 24.5 hours starting on Day 29 (TODAY)              ** Leucovorin 28 mg IV Q6H starting at Hour 42               ** Mercaptopurine 50 mg = 1 tab PO  x 6 days of the week, 25 mg = 0.5 tabs " x 1 day/week)               ** Took Imatinib 400 mg PO morning of admission - administered an addition 200 mg dose in PM (no splitting tabs while in hospital)  ** Starting today, will administer Imatinib 600 mg PO QAM until discharged      - IV fluids per protocol, prehydration x 2 hours and until urine specific gravity < 1.010 and pH > 7   - D5 1/4 NS + 30 mEq sodium bicarbonate at 230 ml/hr (=125 ml/m2/hr)   - Will monitor MTX levels at 24hr, (36 hr if indicated) 42hr, 48hr then daily until MTX <0.1 uM                2) Chemotherapy-related Pancytopenia:  - WBC 1.9, Hgb 11.2, platelets 118  - ,   - No transfusion indicated     3) Sixth Cranial Nerve Palsy (IMPROVED/RESOLVED):             - Followed by Dr. Carranza             - Improvement/Resolution of palsy,     4) At Risk of Opportunistic Lung Infection:             - Holding Bactrim this past weekend, can resume 72 hours after clearance of methotrexate     5) Anxiety (IMPROVED GREATLY):     6) Social:             - Continue with support             - Continue school as tolerated     7) Access:               - R Port-A-Cath in place      8) Research Participant:              [employer] - Source Data         Diagnosis: Ph+ Precursor B-Cell Acute Lymphoblastic Leukemia     Disease Status: EOI1A MRD NEGATIVE, EOI1B RD NEGATIVE, CNS3c, testicular negative, HSV1 IgG POSITIVE, CMV IgG NEGATIVE, VARICELLA IgG POSITIVE     Active Studies: [id number], [id number]                                                                                                      Inactive Studies: [id number]                                                                                                                                                Optional Studies: None             Protocol: International Phase 3 trial in [place] chromosome-positive acute lymphoblastic leukemia (Ph+ ALL) testing imatinib in combination with two different cytotoxic  chemotherapy backbones.      Treatment Plan: [id number](OS), AR-Arm B, Interim Maintenance, Day 29     Height: 1.680 m      Weight: 74.8 kg       BSA: 1.87 m²   (Start of Interim Maintenance 9/29/2022)                                                                                                                                           Performance Status: Karnofsky 100, ECOG  0 (updated 11/1/2022)     Treatment Plan Medications:  (verified 100% compliance)     Currently taking imatinib 400 mg PO QAM and 250 mg QPM  Currently taking mercaptopurine 1 tablet PO QHS Mon-Sat and 0.5 tablet PO QHS Sun     Evaluations / Study Labs:  (11/1/2022)     WBC 1.9, Hgb 11.2, .4, platelet count 118  ,   Creatinine slightly above baseline 0.64 (baseline 0.5-0.6)  AST 39, ALT 94 (2x ULN)  Total Bilirubin 0.3     Therapy Given: (11/2/2022)     Vincristine 2 mg IV (TO BE GIVEN)  Methotrexate 9350 mg IV (5 g/m²)  (TO BE GIVEN)  Mercaptopurine 1 tablet PO daily x 6 days and 0.5 tablets x 1 day (CONTINUED)  Imatinib 600 mg PO daily (CONTINUED)         Disposition: Inpatient for High-dose Methotrexate, discharge pending clearance of methotrexate       Discussed with nursing staff, Dr. Faye.  Total time today approx 30 minutes.    ANN MARIE Rodriguez MD  Pediatric Hematology / Oncology  [facility]  Cell. [phone]  Office. [phone]

## 2022-11-02 NOTE — CARE PLAN
The patient is Watcher - Medium risk of patient condition declining or worsening    Shift Goals  Clinical Goals: Hydration  Patient Goals: Rest    Progress made toward(s) clinical / shift goals:    Problem: Knowledge Deficit - Standard  Goal: Patient and family/care givers will demonstrate understanding of plan of care, disease process/condition, diagnostic tests and medications  Outcome: Progressing  Note: Patient updated on plan of care, all questions answered at this time.     Problem: Fluid Volume  Goal: Fluid volume balance will be maintained  Outcome: Progressing     Problem: Nutrition - Standard  Goal: Patient's nutritional and fluid intake will be adequate or improve  Outcome: Progressing       Patient is not progressing towards the following goals:

## 2022-11-03 LAB
ANION GAP SERPL CALC-SCNC: 8 MMOL/L (ref 7–16)
APPEARANCE UR: CLEAR
BACTERIA #/AREA URNS HPF: NEGATIVE /HPF
BACTERIA #/AREA URNS HPF: NEGATIVE /HPF
BILIRUB UR QL STRIP.AUTO: NEGATIVE
BUN SERPL-MCNC: 4 MG/DL (ref 8–22)
CALCIUM SERPL-MCNC: 8.1 MG/DL (ref 8.5–10.5)
CHLORIDE SERPL-SCNC: 102 MMOL/L (ref 96–112)
CO2 SERPL-SCNC: 26 MMOL/L (ref 20–33)
COLOR UR: YELLOW
CREAT SERPL-MCNC: 0.61 MG/DL (ref 0.5–1.4)
EPI CELLS #/AREA URNS HPF: NEGATIVE /HPF
EPI CELLS #/AREA URNS HPF: NEGATIVE /HPF
GFR SERPLBLD CREATININE-BSD FMLA CKD-EPI: 140 ML/MIN/1.73 M 2
GLUCOSE SERPL-MCNC: 163 MG/DL (ref 65–99)
GLUCOSE UR STRIP.AUTO-MCNC: NEGATIVE MG/DL
HYALINE CASTS #/AREA URNS LPF: ABNORMAL /LPF
HYALINE CASTS #/AREA URNS LPF: ABNORMAL /LPF
KETONES UR STRIP.AUTO-MCNC: NEGATIVE MG/DL
LEUKOCYTE ESTERASE UR QL STRIP.AUTO: NEGATIVE
MICRO URNS: ABNORMAL
MICRO URNS: ABNORMAL
MICRO URNS: NORMAL
MTX SERPL-SCNC: 68 UMOL/L
NITRITE UR QL STRIP.AUTO: NEGATIVE
PH UR STRIP.AUTO: 7.5 [PH] (ref 5–8)
PH UR STRIP.AUTO: 8 [PH] (ref 5–8)
PH UR STRIP.AUTO: 8 [PH] (ref 5–8)
POTASSIUM SERPL-SCNC: 3.4 MMOL/L (ref 3.6–5.5)
PROT UR QL STRIP: 30 MG/DL
PROT UR QL STRIP: 30 MG/DL
PROT UR QL STRIP: NEGATIVE MG/DL
RBC # URNS HPF: ABNORMAL /HPF
RBC # URNS HPF: ABNORMAL /HPF
RBC UR QL AUTO: NEGATIVE
SODIUM SERPL-SCNC: 136 MMOL/L (ref 135–145)
SP GR UR STRIP.AUTO: 1
SP GR UR STRIP.AUTO: 1.01
SP GR UR STRIP.AUTO: <=1.005
UROBILINOGEN UR STRIP.AUTO-MCNC: 0.2 MG/DL
WBC #/AREA URNS HPF: ABNORMAL /HPF
WBC #/AREA URNS HPF: ABNORMAL /HPF

## 2022-11-03 PROCEDURE — 80299 QUANTITATIVE ASSAY DRUG: CPT

## 2022-11-03 PROCEDURE — 81003 URINALYSIS AUTO W/O SCOPE: CPT | Mod: 91

## 2022-11-03 PROCEDURE — 80048 BASIC METABOLIC PNL TOTAL CA: CPT

## 2022-11-03 PROCEDURE — 99232 SBSQ HOSP IP/OBS MODERATE 35: CPT | Performed by: PEDIATRICS

## 2022-11-03 PROCEDURE — 700111 HCHG RX REV CODE 636 W/ 250 OVERRIDE (IP): Performed by: PEDIATRICS

## 2022-11-03 PROCEDURE — 81001 URINALYSIS AUTO W/SCOPE: CPT

## 2022-11-03 PROCEDURE — A9270 NON-COVERED ITEM OR SERVICE: HCPCS | Performed by: PEDIATRICS

## 2022-11-03 PROCEDURE — 700102 HCHG RX REV CODE 250 W/ 637 OVERRIDE(OP): Performed by: PEDIATRICS

## 2022-11-03 PROCEDURE — 770000 HCHG ROOM/CARE - INTERMEDIATE ICU *

## 2022-11-03 PROCEDURE — 700105 HCHG RX REV CODE 258: Performed by: PEDIATRICS

## 2022-11-03 RX ADMIN — 0.12% CHLORHEXIDINE GLUCONATE 15 ML: 1.2 RINSE ORAL at 20:15

## 2022-11-03 RX ADMIN — DEXTROSE MONOHYDRATE AND SODIUM CHLORIDE: 5; .225 INJECTION, SOLUTION INTRAVENOUS at 13:10

## 2022-11-03 RX ADMIN — 0.12% CHLORHEXIDINE GLUCONATE 15 ML: 1.2 RINSE ORAL at 08:28

## 2022-11-03 RX ADMIN — DEXTROSE MONOHYDRATE AND SODIUM CHLORIDE: 5; .225 INJECTION, SOLUTION INTRAVENOUS at 22:06

## 2022-11-03 RX ADMIN — IMATINIB MESYLATE 600 MG: 400 TABLET, FILM COATED ORAL at 08:28

## 2022-11-03 RX ADMIN — DEXTROSE MONOHYDRATE AND SODIUM CHLORIDE: 5; .225 INJECTION, SOLUTION INTRAVENOUS at 04:08

## 2022-11-03 RX ADMIN — ONDANSETRON 8 MG: 2 INJECTION INTRAMUSCULAR; INTRAVENOUS at 08:28

## 2022-11-03 RX ADMIN — DEXTROSE MONOHYDRATE AND SODIUM CHLORIDE: 5; .225 INJECTION, SOLUTION INTRAVENOUS at 08:41

## 2022-11-03 RX ADMIN — ONDANSETRON 8 MG: 2 INJECTION INTRAMUSCULAR; INTRAVENOUS at 16:22

## 2022-11-03 RX ADMIN — MERCAPTOPURINE 50 MG: 50 TABLET ORAL at 08:28

## 2022-11-03 RX ADMIN — DEXTROSE MONOHYDRATE AND SODIUM CHLORIDE: 5; .225 INJECTION, SOLUTION INTRAVENOUS at 18:14

## 2022-11-03 ASSESSMENT — PAIN DESCRIPTION - PAIN TYPE
TYPE: ACUTE PAIN

## 2022-11-03 NOTE — CARE PLAN
Problem: Knowledge Deficit - Standard  Goal: Patient and family/care givers will demonstrate understanding of plan of care, disease process/condition, diagnostic tests and medications  Outcome: Progressing  Note: Patient updated on plan of care for the shift including parameters to start chemotherapy, administration times, and urine collection. All questions answered.      Problem: Fluid Volume  Goal: Fluid volume balance will be maintained  Outcome: Progressing  Note: Pt receiving high dose methotrexate. Hydration fluids infusing as ordered. Urine pH and specific gravity checked throughout shift per protocol.    The patient is Stable - Low risk of patient condition declining or worsening    Shift Goals  Clinical Goals: hydration  Patient Goals: rest  Family Goals: na    Progress made toward(s) clinical / shift goals:  progressing    Patient is not progressing towards the following goals:

## 2022-11-03 NOTE — PROGRESS NOTES
"Pediatric Hematology/Oncology  Daily Progress Note      Patient Name:  Tomas Jean-Baptiste  : 2000  MRN: [id number]    Location of Service:  Inpatient Pediatrics   Date of Service: 11/3/2022  Time: 9:34 AM    Hospital Day: 3    Patient Active Problem List   Diagnosis    Flat feet    Bilateral anterior knee pain    Bilateral ankle joint pain    Chronic midline thoracic back pain    Family history of diabetes mellitus    Callus of foot    PE (pulmonary thromboembolism) ([facility])    Superficial vein thrombosis    Left abducens nerve palsy    Myopia of both eyes    Acquired pancytopenia ([facility])    B lymphoblastic leukemia with t(9;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  Otherwise, no complaints.  No acute events O/N.    Review of Systems:     Constitutional: Afebrile. \"OK\" appetite.  HENT: Negative for  ear pain, nosebleeds, congestion, rhinorrhea, sore throat, mouth sores.  Eyes: Negative for visual changes.  Respiratory: Negative for shortness of breath or cough.     Gastrointestinal: Negative for nausea, vomiting, abdominal pain, diarrhea, constipation.     Skin: Negative for rash or skin infection..  Neurological: Negative for numbness, tingling, sensory changes, weakness or headaches.    Endo/Heme/Allergies: No bruising/bleeding easily.        Medications:    Current Facility-Administered Medications:     chlorhexidine (PERIDEX) 0.12 % solution 15 mL, 15 mL, Mouth/Throat, BID, Pepe Faye M.D., 15 mL at 22    mercaptopurine (PURINETHOL) split tablet 50 mg, 50 mg, Oral, Once per day on Mon Tue Wed Thu Fri Sat, Pepe Faye M.D., 50 mg at 22    sodium bicarbonate 8.4 % 30 mEq in D5  NS 1,000 mL, , Intravenous, Continuous, Pepe Faye M.D., Last Rate: 230 mL/hr at 22, New Bag at 22    " "sodium bicarbonate 8.4 % injection 46.8 mEq, 25 mEq/m2 (Treatment Plan Recorded), Intravenous, PRN, Pepe Faye M.D.    ondansetron (ZOFRAN) syringe/vial injection 8 mg, 8 mg, Intravenous, Q8HRS, Pepe Faye M.D., 8 mg at 22 0828    lidocaine-prilocaine (EMLA) 2.5-2.5 % cream, , Topical, PRN, Pepe Faye M.D., 1 Application at 22 1551    promethazine (PHENERGAN) 6.25 MG/5ML syrup 18.75 mg, 0.25 mg/kg (Treatment Plan Recorded), Oral, Q6HRS PRN, Pepe Faye M.D.    ondansetron (ZOFRAN) syringe/vial injection 8 mg, 8 mg, Intravenous, Q8HRS PRN, Pepe Faye M.D.    NS infusion 500 mL, 500 mL, Intravenous, Continuous, Pepe Faye M.D., Held at 22 1545    [START ON 2022] mercaptopurine (PURINETHOL) split tablet 25 mg, 25 mg, Oral, Q , Pepe Faye M.D.    methotrexate PF 8,415 mg in dextrose 5% 500 mL HIGH DOSE Chemotherapy Infusion (PEDS ONC), , Intravenous, Once, Pepe Faye M.D., Last Rate: 21.3 mL/hr at 22 0659, Rate Verify at 22 0659    leucovorin 28.1 mg in dextrose 5% 25 mL IVPB (PEDS), 15 mg/m2 (Treatment Plan Recorded), Intravenous, Q6HR, Pepe Faye M.D.    imatinib (Gleevec) tablet 600 mg, 600 mg, Oral, DAILY, Pepe Faye M.D., 600 mg at 22 0828    LORazepam (Ativan) 1 mg/mL oral suspension (NICU/PEDS) 1 mg, 1 mg, Oral, Q6HRS PRN, Pepe Faye M.D.    OBJECTIVE:     Max Temp: Temp (24hrs), Av.5 °C (97.7 °F), Min:36 °C (96.8 °F), Max:36.8 °C (98.2 °F)      Vitals: /51   Pulse 77   Temp 36.6 °C (97.8 °F) (Temporal)   Resp 16   Ht 1.675 m (5' 5.95\")   Wt 77.3 kg (170 lb 6.7 oz)   SpO2 95%   BMI 27.55 kg/m²       Intake/Output Summary (Last 24 hours) at 11/3/2022 0934  Last data filed at 11/3/2022 0408  Gross per 24 hour   Intake 6385.79 ml   Output 1880 ml   Net 4505.79 ml       Labs:   Latest Reference Range & Units 11/3/22 11:30   Sodium 135 - 145 mmol/L 136   Potassium 3.6 - 5.5 mmol/L 3.4 (L)   Chloride 96 " "- 112 mmol/L 102   Co2 20 - 33 mmol/L 26   Anion Gap 7.0 - 16.0  8.0   Glucose 65 - 99 mg/dL 163 (H)   Bun 8 - 22 mg/dL 4 (L)   Creatinine 0.50 - 1.40 mg/dL 0.61   GFR (CKD-EPI) >60 mL/min/1.73 m 2 140   Calcium 8.5 - 10.5 mg/dL 8.1 (L)   Methotrexate Sensi umol/L 68.00 (C)       Physical Exam:    Constitutional: Yawning but interactive, NAD. Pale.  HENT: Normocephalic and atraumatic. Alopecia.  No rhinorrhea. Oropharynx is clear and moist.   Eyes: Conjunctivae are normal. No scleral icterus.   Neck: Supple, no adenopathy.    Cardiovascular: RRR w/o murmur.  Pulmonary/Chest: Effort normal. Symmetric expansion.  Clear to auscultation bilaterally.  Abdomen: Soft. Bowel sounds are normal. No distension, organomegaly or mass.     Neurological: Alert and oriented to person and place. Exhibits normal muscle tone bilaterally in upper and lower extremities. Gait normal. Coordination normal.    Skin: No rash or evidence of skin infection. Port site clean and dry, accessed.     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     1) COG protocol [id number], AR Arm B, Interim Maintenance, Day 30:      ** Methotrexate 12 mg IT - COMPLETE yesterday            ** Vincristine 2 mg IV x 1 dose on Day 29 (COMPLETE)              ** Methotrexate 935 IV over 30 minutes on Day 29 (COMPLETE)              ** Methotrexate 8415 mg IV Over 24.5 hours starting on Day 29 (ENDS TODAY)              ** Leucovorin 28 mg IV Q6H starting at Hour 42               ** Mercaptopurine 50 mg = 1 tab PO  x 6 days of the week, 25 mg = 0.5 tabs x 1 day/week)               ** Took Imatinib 400 mg PO morning of admission - administered an addition 200 mg dose in PM (no splitting tabs while in hospital)  ** Starting 11/2, will " administer Imatinib 600 mg PO QAM until discharged      - IV fluids per protocol, prehydration x 2 hours and until urine specific gravity < 1.010 and pH > 7   - D5 1/4 NS + 30 mEq sodium bicarbonate at 230 ml/hr (=125 ml/m2/hr)   - MTX level at Hour 24 is appropriate.   - Will monitor MTX levels at 42hr, 48hr then daily until MTX <0.1 uM                2) Chemotherapy-related Pancytopenia:  - WBC 1.9, Hgb 11.2, platelets 118  - ,   - No transfusion indicated on admission     3) Sixth Cranial Nerve Palsy (IMPROVED/RESOLVED):             - Followed by Dr. Carranza             - Improvement/Resolution of palsy,     4) At Risk of Opportunistic Lung Infection:             - Holding Bactrim this past weekend, can resume 72 hours after clearance of methotrexate     5) Anxiety (IMPROVED GREATLY):     6) Social:             - Continue with support             - Continue school as tolerated     7) Access:               - R Port-A-Cath in place      8) Research Participant:              [facility] - Source Data         Diagnosis: Ph+ Precursor B-Cell Acute Lymphoblastic Leukemia     Disease Status: EOI1A MRD NEGATIVE, EOI1B RD NEGATIVE, CNS3c, testicular negative, HSV1 IgG POSITIVE, CMV IgG NEGATIVE, VARICELLA IgG POSITIVE     Active Studies: [id number], [id number]                                                                                                      Inactive Studies: [id number]                                                                                                                                                Optional Studies: None             Protocol: International Phase 3 trial in [place] 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      Treatment Plan: [id number](OS), AR-Arm B, Interim Maintenance, Day 30     Height: 1.680 m      Weight: 74.8 kg       BSA: 1.87 m²   (Start  of Interim Maintenance 9/29/2022)                                                                                                                                           Performance Status: Karnofsky 100, ECOG  0 (updated 11/1/2022)     Treatment Plan Medications:  (verified 100% compliance)     Currently taking imatinib 400 mg PO QAM and 250 mg QPM  Currently taking mercaptopurine 1 tablet PO QHS Mon-Sat and 0.5 tablet PO QHS Sun     Evaluations / Study Labs:  (11/3/2022)     MTX 68.00 umol/L, creatinine 0.61 mg/dL     Therapy Given: (11/3/2022)     Methotrexate 9350 mg IV (5 g/m²)  (TO COMPLETE)  Mercaptopurine 1 tablet PO daily x 6 days and 0.5 tablets x 1 day (CONTINUED)  Imatinib 600 mg PO daily (CONTINUED)         Disposition: Inpatient for High-dose Methotrexate, discharge pending clearance of methotrexate        Discussed with nursing staff.  Total time today approx 30 minutes.    ANN MARIE Rodriguez MD  Pediatric Hematology / Oncology  [facility]  Cell. [phone]  Office. [phone]

## 2022-11-03 NOTE — PROGRESS NOTES
24 hour methotrexate taken down at 1130. 24 hour methotrexate lab drawn and sent to lab. Pt tolerated chemotherapy well. Blood return confirmed at end of infusion.

## 2022-11-03 NOTE — CARE PLAN
The patient is Watcher - Medium risk of patient condition declining or worsening    Shift Goals  Clinical Goals: Tolerate chemo, Stable urine pH and specific gravity  Patient Goals: Tolerate chemo  Family Goals: na    Progress made toward(s) clinical / shift goals:    Problem: Knowledge Deficit - Standard  Goal: Patient and family/care givers will demonstrate understanding of plan of care, disease process/condition, diagnostic tests and medications  Outcome: Progressing  Note: Patient updated on plan of care, all questions answered at this time.     Problem: Fluid Volume  Goal: Fluid volume balance will be maintained  Outcome: Progressing     Problem: Urinary Elimination  Goal: Establish and maintain regular urinary output  Outcome: Progressing       Patient is not progressing towards the following goals:

## 2022-11-03 NOTE — PROGRESS NOTES
Afebrile.  VSS.  Awake and alert in no acute distress.      Chemotherapy dosage calculated independently by Nolan and compared to road map for protocol HD MTX Interim la nena MADDOX Arm B.  Calculations within 10% of written order.  Lab results reviewed.      Premedications and chemo given as ordered, see MAR.  Blood return verified prior to, during, and after vincristine chemotherapy infusion and prior to start of Methotrexate.  See Chemotherapy flowsheet.  PT tolerated well.  No side effects or complications noted.  Roadmap updated. 24 hour MTX running in to night shift.

## 2022-11-03 NOTE — PROGRESS NOTES
Pt demonstrates ability to turn self in bed without assistance of staff. Patient understands importance in prevention of skin breakdown, ulcers, and potential infection. Hourly rounding in effect. RN skin check complete.   Devices in place include: R chest port.  Skin assessed under devices: Yes.  Confirmed HAPI identified on the following date: NA   Location of HAPI: NA.  Wound Care RN following: No.  The following interventions are in place: Skin checked with each assessment, patient repositions self as needed.

## 2022-11-03 NOTE — PROGRESS NOTES
Pt demonstrates ability to turn self in bed without assistance of staff. Patient and family understands importance in prevention of skin breakdown, ulcers, and potential infection. Hourly rounding in effect. RN skin check complete.   Devices in place include: chest port.  Skin assessed under devices: Yes.  Confirmed HAPI identified on the following date: NA   Location of HAPI: NA.  Wound Care RN following: No.  The following interventions are in place: skin checked with each assessment, pt repositions self in bed.

## 2022-11-03 NOTE — PROGRESS NOTES
Pt demonstrates ability to turn self in bed without assistance of staff. Patient and family understands importance in prevention of skin breakdown, ulcers, and potential infection. Hourly rounding in effect. RN skin check complete.   Devices in place include: chest port.  Skin assessed under devices: Yes.  Confirmed HAPI identified on the following date: NA   Location of HAPI: NA.  Wound Care RN following: No.  The following interventions are in place: skin checked with each assessment, pt repositions self.

## 2022-11-03 NOTE — CARE PLAN
Problem: Knowledge Deficit - Standard  Goal: Patient and family/care givers will demonstrate understanding of plan of care, disease process/condition, diagnostic tests and medications  Outcome: Progressing  Note: Patient updated on plan of care for the shift. All questions answered. Pt has call light available at bedside.      Problem: Urinary Elimination  Goal: Establish and maintain regular urinary output  Outcome: Progressing  Note: Pt receiving IV hydration fluids while methotrexate is running. Pt producing adequate amounts of urine. Urine collected every 4-6 hours per protocol to monitor specific gravity and pH.    The patient is Stable - Low risk of patient condition declining or worsening    Shift Goals  Clinical Goals: tolerate chemo  Patient Goals: tolerate chemo  Family Goals: na    Progress made toward(s) clinical / shift goals:  progressing    Patient is not progressing towards the following goals:

## 2022-11-03 NOTE — DISCHARGE PLANNING
Medical records reviewed by this RN Case Manager. Pt admitted inpatient to acute pediatrics for chemotherapy. Patient lives with mother and siblings in [place]. His insurance is through [facility] (primary) and Medicaid HMO/[employer] Medicaid (secondary). JULY's PCP is listed as Margarito Arvizu MD and is followed by peds heme-onc. Pt to be discharged home when medically cleared. No CM needs noted at this time. Will continue to follow for discharge needs.

## 2022-11-04 LAB
ANION GAP SERPL CALC-SCNC: 10 MMOL/L (ref 7–16)
APPEARANCE UR: CLEAR
BILIRUB UR QL STRIP.AUTO: NEGATIVE
BUN SERPL-MCNC: 2 MG/DL (ref 8–22)
CALCIUM SERPL-MCNC: 8.6 MG/DL (ref 8.5–10.5)
CHLORIDE SERPL-SCNC: 103 MMOL/L (ref 96–112)
CO2 SERPL-SCNC: 24 MMOL/L (ref 20–33)
COLOR UR: YELLOW
CREAT SERPL-MCNC: 0.64 MG/DL (ref 0.5–1.4)
GFR SERPLBLD CREATININE-BSD FMLA CKD-EPI: 138 ML/MIN/1.73 M 2
GLUCOSE SERPL-MCNC: 115 MG/DL (ref 65–99)
GLUCOSE UR STRIP.AUTO-MCNC: NEGATIVE MG/DL
KETONES UR STRIP.AUTO-MCNC: NEGATIVE MG/DL
LEUKOCYTE ESTERASE UR QL STRIP.AUTO: NEGATIVE
MICRO URNS: ABNORMAL
MICRO URNS: NORMAL
MTX SERPL-SCNC: 0.78 UMOL/L
MTX SERPL-SCNC: 0.91 UMOL/L
NITRITE UR QL STRIP.AUTO: NEGATIVE
PH UR STRIP.AUTO: 7.5 [PH] (ref 5–8)
PH UR STRIP.AUTO: 8 [PH] (ref 5–8)
PH UR STRIP.AUTO: 8 [PH] (ref 5–8)
PH UR STRIP.AUTO: 8.5 [PH] (ref 5–8)
POTASSIUM SERPL-SCNC: 3.2 MMOL/L (ref 3.6–5.5)
PROT UR QL STRIP: NEGATIVE MG/DL
RBC UR QL AUTO: NEGATIVE
SODIUM SERPL-SCNC: 137 MMOL/L (ref 135–145)
SP GR UR STRIP.AUTO: 1
SP GR UR STRIP.AUTO: 1
SP GR UR STRIP.AUTO: 1.01
SP GR UR STRIP.AUTO: 1.01
UROBILINOGEN UR STRIP.AUTO-MCNC: 0.2 MG/DL
UROBILINOGEN UR STRIP.AUTO-MCNC: 0.2 MG/DL
UROBILINOGEN UR STRIP.AUTO-MCNC: 1 MG/DL
UROBILINOGEN UR STRIP.AUTO-MCNC: 1 MG/DL

## 2022-11-04 PROCEDURE — 81003 URINALYSIS AUTO W/O SCOPE: CPT

## 2022-11-04 PROCEDURE — 700105 HCHG RX REV CODE 258: Performed by: PEDIATRICS

## 2022-11-04 PROCEDURE — 80048 BASIC METABOLIC PNL TOTAL CA: CPT

## 2022-11-04 PROCEDURE — 99231 SBSQ HOSP IP/OBS SF/LOW 25: CPT | Performed by: PEDIATRICS

## 2022-11-04 PROCEDURE — 700111 HCHG RX REV CODE 636 W/ 250 OVERRIDE (IP): Performed by: PEDIATRICS

## 2022-11-04 PROCEDURE — A9270 NON-COVERED ITEM OR SERVICE: HCPCS | Performed by: PEDIATRICS

## 2022-11-04 PROCEDURE — 770000 HCHG ROOM/CARE - INTERMEDIATE ICU *

## 2022-11-04 PROCEDURE — 700102 HCHG RX REV CODE 250 W/ 637 OVERRIDE(OP): Performed by: PEDIATRICS

## 2022-11-04 PROCEDURE — 80299 QUANTITATIVE ASSAY DRUG: CPT

## 2022-11-04 RX ADMIN — LEUCOVORIN CALCIUM 28.1 MG: 100 INJECTION, POWDER, LYOPHILIZED, FOR SUSPENSION INTRAMUSCULAR; INTRAVENOUS at 11:30

## 2022-11-04 RX ADMIN — MERCAPTOPURINE 50 MG: 50 TABLET ORAL at 10:18

## 2022-11-04 RX ADMIN — DEXTROSE MONOHYDRATE AND SODIUM CHLORIDE: 5; .225 INJECTION, SOLUTION INTRAVENOUS at 17:36

## 2022-11-04 RX ADMIN — DEXTROSE MONOHYDRATE AND SODIUM CHLORIDE: 5; .225 INJECTION, SOLUTION INTRAVENOUS at 02:33

## 2022-11-04 RX ADMIN — DEXTROSE MONOHYDRATE AND SODIUM CHLORIDE: 5; .225 INJECTION, SOLUTION INTRAVENOUS at 12:59

## 2022-11-04 RX ADMIN — LEUCOVORIN CALCIUM 28.1 MG: 100 INJECTION, POWDER, LYOPHILIZED, FOR SUSPENSION INTRAMUSCULAR; INTRAVENOUS at 17:23

## 2022-11-04 RX ADMIN — ONDANSETRON 8 MG: 2 INJECTION INTRAMUSCULAR; INTRAVENOUS at 15:54

## 2022-11-04 RX ADMIN — 0.12% CHLORHEXIDINE GLUCONATE 15 ML: 1.2 RINSE ORAL at 21:07

## 2022-11-04 RX ADMIN — DEXTROSE MONOHYDRATE AND SODIUM CHLORIDE: 5; .225 INJECTION, SOLUTION INTRAVENOUS at 23:00

## 2022-11-04 RX ADMIN — 0.12% CHLORHEXIDINE GLUCONATE 15 ML: 1.2 RINSE ORAL at 09:21

## 2022-11-04 RX ADMIN — IMATINIB MESYLATE 600 MG: 400 TABLET, FILM COATED ORAL at 09:20

## 2022-11-04 RX ADMIN — ONDANSETRON 8 MG: 2 INJECTION INTRAMUSCULAR; INTRAVENOUS at 09:20

## 2022-11-04 RX ADMIN — LEUCOVORIN CALCIUM 28.1 MG: 100 INJECTION, POWDER, LYOPHILIZED, FOR SUSPENSION INTRAMUSCULAR; INTRAVENOUS at 23:26

## 2022-11-04 RX ADMIN — DEXTROSE MONOHYDRATE AND SODIUM CHLORIDE: 5; .225 INJECTION, SOLUTION INTRAVENOUS at 07:38

## 2022-11-04 RX ADMIN — ONDANSETRON 8 MG: 2 INJECTION INTRAMUSCULAR; INTRAVENOUS at 00:20

## 2022-11-04 RX ADMIN — LEUCOVORIN CALCIUM 28.1 MG: 100 INJECTION, POWDER, LYOPHILIZED, FOR SUSPENSION INTRAMUSCULAR; INTRAVENOUS at 05:27

## 2022-11-04 RX ADMIN — ONDANSETRON 8 MG: 2 INJECTION INTRAMUSCULAR; INTRAVENOUS at 23:27

## 2022-11-04 ASSESSMENT — PAIN DESCRIPTION - PAIN TYPE
TYPE: ACUTE PAIN

## 2022-11-04 NOTE — PROGRESS NOTES
"Pediatric Hematology/Oncology  Daily Progress Note      Patient Name:  Tomas Jean-Baptiste  : 2000  MRN: [id number]    Location of Service:  Inpatient Pediatrics  Date of Service: 2022  Time: 2:55 PM    Hospital Day: 4    Patient Active Problem List   Diagnosis    Flat feet    Bilateral anterior knee pain    Bilateral ankle joint pain    Chronic midline thoracic back pain    Family history of diabetes mellitus    Callus of foot    PE (pulmonary thromboembolism) ([facility])    Superficial vein thrombosis    Left abducens nerve palsy    Myopia of both eyes    Acquired pancytopenia ([facility])    B lymphoblastic leukemia with t(9;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  He is disappointed (but not really surprised) that he will not be able to go home today (due to slow methotrexate clearance).    Review of Systems:     Constitutional: Afebrile. \"OK\" appetite.  HENT: Negative for nosebleeds, congestion, rhinorrhea, mouth sores.  Eyes: Negative for visual changes.  Respiratory: Negative for shortness of breath or cough.   Gastrointestinal: Negative for vomiting, abdominal pain, diarrhea, constipation.     Skin: Negative for rash or skin infection..  Neurological: Negative for numbness, tingling, sensory changes, weakness or headaches.    Endo/Heme/Allergies: No bruising/bleeding easily.      Medications:    Current Facility-Administered Medications:     chlorhexidine (PERIDEX) 0.12 % solution 15 mL, 15 mL, Mouth/Throat, BID, Pepe Faye M.D., 15 mL at 22 0921    mercaptopurine (PURINETHOL) tablet 50 mg, 50 mg, Oral, Once per day on Mon Tue Wed Thu Fri Sat, Pepe Faye M.D., 50 mg at 22 1018    sodium bicarbonate 8.4 % 30 mEq in D5  NS 1,000 mL, , Intravenous, Continuous, Pepe Faye M.D., Last Rate: 230 " "mL/hr at 22 1259, New Bag at 22 1259    sodium bicarbonate 8.4 % injection 46.8 mEq, 25 mEq/m2 (Treatment Plan Recorded), Intravenous, PRN, Pepe Faye M.D.    ondansetron (ZOFRAN) syringe/vial injection 8 mg, 8 mg, Intravenous, Q8HRS, Pepe Faye M.D., 8 mg at 22 0920    lidocaine-prilocaine (EMLA) 2.5-2.5 % cream, , Topical, PRN, Pepe Faye M.D., 1 Application at 22 1551    promethazine (PHENERGAN) 6.25 MG/5ML syrup 18.75 mg, 0.25 mg/kg (Treatment Plan Recorded), Oral, Q6HRS PRN, Pepe Faye M.D.    ondansetron (ZOFRAN) syringe/vial injection 8 mg, 8 mg, Intravenous, Q8HRS PRN, Pepe Faye M.D.    NS infusion 500 mL, 500 mL, Intravenous, Continuous, Pepe Faye M.D., Held at 22 1545    [START ON 2022] mercaptopurine (PURINETHOL) split tablet 25 mg, 25 mg, Oral, Q , Pepe Faye M.D.    leucovorin 28.1 mg in dextrose 5% 25 mL IVPB (PEDS), 15 mg/m2 (Treatment Plan Recorded), Intravenous, Q6HR, Pepe Faye M.D., Stopped at 22 1140    imatinib (Gleevec) tablet 600 mg, 600 mg, Oral, DAILY, Pepe Faye M.D., 600 mg at 22 0920    LORazepam (Ativan) 1 mg/mL oral suspension (NICU/PEDS) 1 mg, 1 mg, Oral, Q6HRS PRN, Pepe Faye M.D.    OBJECTIVE:     Max Temp: Temp (24hrs), Av.7 °C (98 °F), Min:36.4 °C (97.6 °F), Max:36.9 °C (98.4 °F)      Vitals: /71   Pulse 87   Temp 36.4 °C (97.6 °F) (Temporal)   Resp 16   Ht 1.675 m (5' 5.95\")   Wt 77.3 kg (170 lb 6.7 oz)   SpO2 97%   BMI 27.55 kg/m²       Intake/Output Summary (Last 24 hours) at 2022 1455  Last data filed at 2022 0800  Gross per 24 hour   Intake 5489.33 ml   Output 1800 ml   Net 3689.33 ml       Labs:   Latest Reference Range & Units 22 11:30   Sodium 135 - 145 mmol/L 137   Potassium 3.6 - 5.5 mmol/L 3.2 (L)   Chloride 96 - 112 mmol/L 103   Co2 20 - 33 mmol/L 24   Anion Gap 7.0 - 16.0  10.0   Glucose 65 - 99 mg/dL 115 (H)   Bun 8 - 22 mg/dL 2 (L) " "  Creatinine 0.50 - 1.40 mg/dL 0.64   GFR (CKD-EPI) >60 mL/min/1.73 m 2 138   Calcium 8.5 - 10.5 mg/dL 8.6      Latest Reference Range & Units 11/3/22 11:30 11/4/22 05:20 11/4/22 11:30   Methotrexate Sensi umol/L 68.00 (C) 0.91 0.78   (C): Corrected    Physical Exam:    Constitutional: Sleepy but arousable, pleasant, cooperative.   HENT: Normocephalic and atraumatic. Partial alopecia.  No rhinorrhea. Oropharynx is clear and moist.   Eyes: Conjunctivae are normal. No scleral icterus.   Neck: Supple, no adenopathy.    Cardiovascular: RRR w/o murmur.  Pulmonary/Chest: Effort normal. Symmetric expansion.  Clear to auscultation bilaterally.  Abdomen: Soft. Bowel sounds are normal. No distension, organomegaly or mass.     Skin: Skin is warm, dry and pink.  No rash or evidence of skin infection. Port site clean and dry, accessed.     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     1) COG protocol [id number], AR Arm B, Interim Maintenance, Day 31:      ** Methotrexate 12 mg IT - COMPLETE yesterday            ** Vincristine 2 mg IV x 1 dose on Day 29 (COMPLETE)              ** Methotrexate 935 IV over 30 minutes on Day 29 (COMPLETE)              ** Methotrexate 8415 mg IV Over 24.5 hours starting on Day 29 (COMPLETE)              ** Leucovorin 28 mg IV Q6H started at Hour 42               ** Mercaptopurine 50 mg = 1 tab PO  x 6 days of the week, 25 mg = 0.5 tabs x 1 day/week)               ** Took Imatinib 400 mg PO morning of admission - administered an addition 200 mg dose in PM (no splitting tabs while in hospital)  ** Starting 11/2, will administer Imatinib 600 mg PO QAM until discharged      - IV fluids per protocol, prehydration x 2 hours and until urine specific gravity < 1.010 " and pH > 7   - D5 1/4 NS + 30 mEq sodium bicarbonate at 230 ml/hr (=125 ml/m2/hr)   - MTX level at Hour 42 was < 1 umol/L but at Hour 48 is > 0.4 umol/L, so unable to discharge today.   - Will continue hydration/leucovorin, monitor MTX levels daily until MTX <0.1 uM                2) Chemotherapy-related Pancytopenia:  - WBC 1.9, Hgb 11.2, platelets 118  - ,   - No transfusion indicated on admission     3) Sixth Cranial Nerve Palsy (IMPROVED/RESOLVED):             - Followed by Dr. Carranza             - Improvement/Resolution of palsy,     4) At Risk of Opportunistic Lung Infection:             - Holding Bactrim this past weekend, can resume 72 hours after clearance of methotrexate     5) Anxiety (IMPROVED GREATLY):     6) Social:             - Continue with support             - Continue school as tolerated     7) Access:               - R Port-A-Cath in place      8) Research Participant:              [employer] [facility] [facility] - Source Data         Diagnosis: Ph+ Precursor B-Cell Acute Lymphoblastic Leukemia     Disease Status: EOI1A MRD NEGATIVE, EOI1B RD NEGATIVE, CNS3c, testicular negative, HSV1 IgG POSITIVE, CMV IgG NEGATIVE, VARICELLA IgG POSITIVE     Active Studies: [id number], [id number]                                                                                                      Inactive Studies: [id number]                                                                                                                                                Optional Studies: None             Protocol: International Phase 3 trial in [place] 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      Treatment Plan: [id number](OS), AR-Arm B, Interim Maintenance, Day 31     Height: 1.680 m      Weight: 74.8 kg       BSA: 1.87 m²   (Start of Interim Maintenance 9/29/2022)                                           "                                                                                                 Performance Status: Karnofsky 100, ECOG  0 (updated 11/1/2022)     Treatment Plan Medications:  (verified 100% compliance)     Currently taking imatinib 400 mg PO QAM and 250 mg QPM  Currently taking mercaptopurine 1 tablet PO QHS Mon-Sat and 0.5 tablet PO QHS Sun     Evaluations / Study Labs:  (11/4/2022)     MTX 0.91 umol/L, 0.78 umol/L; creatinine 0.64 mg/dL     Therapy Given: (11/4/2022)     Leucovorin \"rescue\"  Mercaptopurine 1 tablet PO daily x 6 days and 0.5 tablets x 1 day (CONTINUED)  Imatinib 600 mg PO daily (CONTINUED)         Disposition: Discharge pending clearance of methotrexate (1-2 days?)       Discussed with nursing staff.  Total time today approx 30 minutes.    ANN MARIE Rodriguez MD  Pediatric Hematology / Oncology  [facility]  Cell. [phone]  Office. [phone]    "

## 2022-11-04 NOTE — PROGRESS NOTES
Pt demonstrates ability to turn self in bed without assistance of staff. Patient and family understands importance in prevention of skin breakdown, ulcers, and potential infection. Hourly rounding in effect. RN skin check complete.   Devices in place include: chest port.  Skin assessed under devices: N/A.  Confirmed HAPI identified on the following date: NA   Location of HAPI: NA.  Wound Care RN following: No.  The following interventions are in place: skin checked with each assessment, pt repositions self.

## 2022-11-04 NOTE — DIETARY
Brief Nutrition Services Note: No diet order since 11/1.   Reviewed with RN, pt getting meals from home and asked to discontinue hospital trays  because  he felt bad about wasting them.   RN explains pt normally eats poorly during chemo treatment and snacks from foods from home but then when he feels better goes back to eating his normal intake, dislikes Boost.   RN explains they are monitoring for methotrexate clearance before discharge which may be tomorrow or possibly by MON.       Plan/Rec: Continue meals from home as okay'd per MD. RD to rescreen weekly for adequate intake.

## 2022-11-04 NOTE — CARE PLAN
The patient is Watcher - Medium risk of patient condition declining or worsening    Shift Goals  Clinical Goals: Nausea control, Stable labs  Patient Goals: Nausea control  Family Goals: na    Progress made toward(s) clinical / shift goals:    Problem: Knowledge Deficit - Standard  Goal: Patient and family/care givers will demonstrate understanding of plan of care, disease process/condition, diagnostic tests and medications  Outcome: Progressing  Note: Patient updated on plan of care, all questions answered at this time.     Problem: Fluid Volume  Goal: Fluid volume balance will be maintained  Outcome: Progressing     Problem: Self Care  Goal: Patient will have the ability to perform ADLs independently or with assistance (bathe, groom, dress, toilet and feed)  Outcome: Progressing       Patient is not progressing towards the following goals:

## 2022-11-05 VITALS
OXYGEN SATURATION: 100 % | HEART RATE: 82 BPM | DIASTOLIC BLOOD PRESSURE: 60 MMHG | TEMPERATURE: 97.7 F | WEIGHT: 167.11 LBS | RESPIRATION RATE: 16 BRPM | HEIGHT: 66 IN | SYSTOLIC BLOOD PRESSURE: 110 MMHG | BODY MASS INDEX: 26.86 KG/M2

## 2022-11-05 LAB
ANION GAP SERPL CALC-SCNC: 9 MMOL/L (ref 7–16)
APPEARANCE UR: CLEAR
APPEARANCE UR: CLEAR
BILIRUB UR QL STRIP.AUTO: NEGATIVE
BILIRUB UR QL STRIP.AUTO: NEGATIVE
BUN SERPL-MCNC: 2 MG/DL (ref 8–22)
CALCIUM SERPL-MCNC: 8.4 MG/DL (ref 8.5–10.5)
CHLORIDE SERPL-SCNC: 106 MMOL/L (ref 96–112)
CO2 SERPL-SCNC: 26 MMOL/L (ref 20–33)
COLOR UR: YELLOW
COLOR UR: YELLOW
CREAT SERPL-MCNC: 0.56 MG/DL (ref 0.5–1.4)
GFR SERPLBLD CREATININE-BSD FMLA CKD-EPI: 144 ML/MIN/1.73 M 2
GLUCOSE SERPL-MCNC: 102 MG/DL (ref 65–99)
GLUCOSE UR STRIP.AUTO-MCNC: NEGATIVE MG/DL
GLUCOSE UR STRIP.AUTO-MCNC: NEGATIVE MG/DL
KETONES UR STRIP.AUTO-MCNC: NEGATIVE MG/DL
KETONES UR STRIP.AUTO-MCNC: NEGATIVE MG/DL
LEUKOCYTE ESTERASE UR QL STRIP.AUTO: NEGATIVE
LEUKOCYTE ESTERASE UR QL STRIP.AUTO: NEGATIVE
MICRO URNS: NORMAL
MICRO URNS: NORMAL
MTX SERPL-SCNC: 0.09 UMOL/L
NITRITE UR QL STRIP.AUTO: NEGATIVE
NITRITE UR QL STRIP.AUTO: NEGATIVE
PH UR STRIP.AUTO: 7.5 [PH] (ref 5–8)
PH UR STRIP.AUTO: 8 [PH] (ref 5–8)
POTASSIUM SERPL-SCNC: 3.4 MMOL/L (ref 3.6–5.5)
PROT UR QL STRIP: NEGATIVE MG/DL
PROT UR QL STRIP: NEGATIVE MG/DL
RBC UR QL AUTO: NEGATIVE
RBC UR QL AUTO: NEGATIVE
SODIUM SERPL-SCNC: 141 MMOL/L (ref 135–145)
SP GR UR STRIP.AUTO: 1
SP GR UR STRIP.AUTO: 1.01
UROBILINOGEN UR STRIP.AUTO-MCNC: 0.2 MG/DL
UROBILINOGEN UR STRIP.AUTO-MCNC: 1 MG/DL

## 2022-11-05 PROCEDURE — 81003 URINALYSIS AUTO W/O SCOPE: CPT

## 2022-11-05 PROCEDURE — A9270 NON-COVERED ITEM OR SERVICE: HCPCS | Performed by: PEDIATRICS

## 2022-11-05 PROCEDURE — 99238 HOSP IP/OBS DSCHRG MGMT 30/<: CPT | Performed by: PEDIATRICS

## 2022-11-05 PROCEDURE — 700111 HCHG RX REV CODE 636 W/ 250 OVERRIDE (IP): Performed by: PEDIATRICS

## 2022-11-05 PROCEDURE — 700105 HCHG RX REV CODE 258: Performed by: PEDIATRICS

## 2022-11-05 PROCEDURE — 99231 SBSQ HOSP IP/OBS SF/LOW 25: CPT | Performed by: PEDIATRICS

## 2022-11-05 PROCEDURE — 700102 HCHG RX REV CODE 250 W/ 637 OVERRIDE(OP): Performed by: PEDIATRICS

## 2022-11-05 PROCEDURE — 80299 QUANTITATIVE ASSAY DRUG: CPT

## 2022-11-05 PROCEDURE — 80048 BASIC METABOLIC PNL TOTAL CA: CPT

## 2022-11-05 RX ORDER — HEPARIN SODIUM (PORCINE) LOCK FLUSH IV SOLN 100 UNIT/ML 100 UNIT/ML
300-500 SOLUTION INTRAVENOUS PRN
Status: DISCONTINUED | OUTPATIENT
Start: 2022-11-05 | End: 2022-11-05 | Stop reason: HOSPADM

## 2022-11-05 RX ADMIN — Medication 500 UNITS: at 13:34

## 2022-11-05 RX ADMIN — 0.12% CHLORHEXIDINE GLUCONATE 15 ML: 1.2 RINSE ORAL at 09:15

## 2022-11-05 RX ADMIN — LEUCOVORIN CALCIUM 28.1 MG: 100 INJECTION, POWDER, LYOPHILIZED, FOR SUSPENSION INTRAMUSCULAR; INTRAVENOUS at 05:27

## 2022-11-05 RX ADMIN — LEUCOVORIN CALCIUM 28.1 MG: 100 INJECTION, POWDER, LYOPHILIZED, FOR SUSPENSION INTRAMUSCULAR; INTRAVENOUS at 11:55

## 2022-11-05 RX ADMIN — ONDANSETRON 8 MG: 2 INJECTION INTRAMUSCULAR; INTRAVENOUS at 08:24

## 2022-11-05 RX ADMIN — DEXTROSE MONOHYDRATE AND SODIUM CHLORIDE: 5; .225 INJECTION, SOLUTION INTRAVENOUS at 04:16

## 2022-11-05 RX ADMIN — IMATINIB MESYLATE 600 MG: 400 TABLET, FILM COATED ORAL at 10:57

## 2022-11-05 RX ADMIN — DEXTROSE MONOHYDRATE AND SODIUM CHLORIDE: 5; .225 INJECTION, SOLUTION INTRAVENOUS at 09:14

## 2022-11-05 RX ADMIN — MERCAPTOPURINE 50 MG: 50 TABLET ORAL at 10:56

## 2022-11-05 ASSESSMENT — PAIN DESCRIPTION - PAIN TYPE
TYPE: ACUTE PAIN
TYPE: ACUTE PAIN

## 2022-11-05 ASSESSMENT — FIBROSIS 4 INDEX: FIB4 SCORE: 0.64

## 2022-11-05 NOTE — PROGRESS NOTES
Report received from MONTSERRAT Napier. Per report pt on RA, running fluids @ 230, no family at bedside. Pt resting comfortably in bed, fall precautions in place, bed in lowest locked position, call light within reach.
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
## 2022-11-05 NOTE — PROGRESS NOTES
"Pediatric Hematology/Oncology  Daily Progress Note      Patient Name:  Tomas Jean-Baptiste  : 2000  MRN: [id number]    Location of Service:  Inpatient Pediatrics  Date of Service: 2022  Time: 9:51 AM    Hospital Day: 5    Patient Active Problem List   Diagnosis    Flat feet    Bilateral anterior knee pain    Bilateral ankle joint pain    Chronic midline thoracic back pain    Family history of diabetes mellitus    Callus of foot    PE (pulmonary thromboembolism) ([facility])    Superficial vein thrombosis    Left abducens nerve palsy    Myopia of both eyes    Acquired pancytopenia ([facility])    B lymphoblastic leukemia with t(9;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  \"I just want to go home.\"    Review of Systems:     Constitutional: Afebrile. Fair appetite.  HENT: Negative for nosebleeds, congestion, rhinorrhea, sore throat, mouth sores.  Eyes: Negative for visual changes.  Respiratory: Negative for shortness of breath or cough.   Gastrointestinal: Negative for nausea, vomiting, abdominal pain, diarrhea, constipation.    Skin: Negative for rash or skin infection..  Neurological: Negative for numbness, tingling, sensory changes, weakness or headaches.    Endo/Heme/Allergies: No bruising/bleeding easily.      Medications:    Current Facility-Administered Medications:     chlorhexidine (PERIDEX) 0.12 % solution 15 mL, 15 mL, Mouth/Throat, BID, Pepe Faye M.D., 15 mL at 22 0915    mercaptopurine (PURINETHOL) tablet 50 mg, 50 mg, Oral, Once per day on Mon Tue Wed Thu Fri Sat, Pepe Faye M.D., 50 mg at 22 1018    sodium bicarbonate 8.4 % 30 mEq in D5 1/4 NS 1,000 mL, , Intravenous, Continuous, Pepe Faye M.D., Last Rate: 230 mL/hr at 22 0914, New Bag at 22    sodium bicarbonate 8.4 % injection 46.8 mEq, 25 mEq/m2 " "(Treatment Plan Recorded), Intravenous, PRN, Pepe Faye M.D.    ondansetron (ZOFRAN) syringe/vial injection 8 mg, 8 mg, Intravenous, Q8HRS, Pepe Faye M.D., 8 mg at 22 0824    lidocaine-prilocaine (EMLA) 2.5-2.5 % cream, , Topical, PRN, Pepe Faye M.D., 1 Application at 22 1551    promethazine (PHENERGAN) 6.25 MG/5ML syrup 18.75 mg, 0.25 mg/kg (Treatment Plan Recorded), Oral, Q6HRS PRN, Pepe Faye M.D.    ondansetron (ZOFRAN) syringe/vial injection 8 mg, 8 mg, Intravenous, Q8HRS PRN, Pepe Faye M.D.    NS infusion 500 mL, 500 mL, Intravenous, Continuous, Pepe Faye M.D., Held at 22 1545    [START ON 2022] mercaptopurine (PURINETHOL) split tablet 25 mg, 25 mg, Oral, Q , Pepe Faye M.D.    leucovorin 28.1 mg in dextrose 5% 25 mL IVPB (PEDS), 15 mg/m2 (Treatment Plan Recorded), Intravenous, Q6HR, Pepe Faye M.D., Stopped at 22 0537    imatinib (Gleevec) tablet 600 mg, 600 mg, Oral, DAILY, Pepe Faye M.D., 600 mg at 22 0920    LORazepam (Ativan) 1 mg/mL oral suspension (NICU/PEDS) 1 mg, 1 mg, Oral, Q6HRS PRN, Pepe Faye M.D.    OBJECTIVE:     Max Temp: Temp (24hrs), Av.8 °C (98.2 °F), Min:36.4 °C (97.6 °F), Max:37.1 °C (98.8 °F)      Vitals: /60   Pulse 82   Temp 36.5 °C (97.7 °F) (Temporal)   Resp 16   Ht 1.675 m (5' 5.95\")   Wt 75.8 kg (167 lb 1.7 oz)   SpO2 100%   BMI 27.02 kg/m²       Intake/Output Summary (Last 24 hours) at 2022 0951  Last data filed at 2022 0924  Gross per 24 hour   Intake 6575 ml   Output 1895 ml   Net 4680 ml       Today's weight is 75.8 kg, down 1.5 kg from 22.    Labs:   Latest Reference Range & Units 22 05:15 22 09:24   Color  Yellow Yellow   Character  Clear Clear   Specific Gravity <1.035  1.006 1.004   Ph 5.0 - 8.0  7.5 8.0   Glucose Negative mg/dL Negative Negative   Ketones Negative mg/dL Negative Negative   Bilirubin Negative  Negative Negative   Occult " "Blood Negative  Negative Negative   Protein Negative mg/dL Negative Negative   Nitrite Negative  Negative Negative   Leukocyte Esterase Negative  Negative Negative   Urobilinogen, Urine Negative  1.0 0.2         Physical Exam:    Constitutional: Pleasant, NAD.   HENT: Normocephalic and atraumatic. Mild alopecia.  No rhinorrhea. Oropharynx is clear and moist.   Eyes: Conjunctivae are normal. No scleral icterus.   Neck: Supple, no adenopathy.    Cardiovascular: RRR w/o murmur.  Pulmonary/Chest: Effort normal. Symmetric expansion.  Clear to auscultation bilaterally.  Abdomen: Soft. Bowel sounds are normal. No distension, organomegaly or mass.      Skin: Skin is warm, dry and pink.  No rash or evidence of skin infection. Port site clean and dry, accessed.     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     1) COG protocol [id number], AR Arm B, Interim Maintenance, Day 32:      ** Methotrexate 12 mg IT - COMPLETE yesterday            ** Vincristine 2 mg IV x 1 dose on Day 29 (COMPLETE)              ** Methotrexate 935 IV over 30 minutes on Day 29 (COMPLETE)              ** Methotrexate 8415 mg IV Over 24.5 hours starting on Day 29 (COMPLETE)              ** Leucovorin 28 mg IV Q6H started at Hour 42               ** Mercaptopurine 50 mg = 1 tab PO  x 6 days of the week, 25 mg = 0.5 tabs x 1 day/week)               ** Took Imatinib 400 mg PO morning of admission - administered an addition 200 mg dose in PM (no splitting tabs while in hospital)  ** Starting 11/2, will administer Imatinib 600 mg PO QAM until discharged      - IV fluids per protocol, prehydration x 2 hours and until SG < 1.010 and pH > 7   - D5 1/4 NS + 30 mEq sodium bicarbonate at 230 ml/hr (=125 ml/m2/hr)   - MTX " level at Hour 42 was < 1 umol/L but at Hour 48 was > 0.4 umol/L, so unable to discharge yesterday.   - Will continue hydration/leucovorin, monitor MTX levels daily until MTX <0.1 uM   - Today's MTX is pending, but I do not anticipate discharge before tomorrow                2) Chemotherapy-related Pancytopenia:  - WBC 1.9, Hgb 11.2, platelets 118  - ,   - No transfusion indicated on admission     3) Sixth Cranial Nerve Palsy (IMPROVED/RESOLVED):             - Followed by Dr. Carranza             - Improvement/Resolution of palsy,     4) At Risk of Opportunistic Lung Infection:             - Holding Bactrim this past weekend, can resume 72 hours after clearance of methotrexate     5) Anxiety (IMPROVED GREATLY):     6) Social:             - Continue with support             - Continue school as tolerated     7) Access:               - R Port-A-Cath in place      8) Research Participant:              [facility] - Source Data         Diagnosis: Ph+ Precursor B-Cell Acute Lymphoblastic Leukemia     Disease Status: EOI1A MRD NEGATIVE, EOI1B RD NEGATIVE, CNS3c, testicular negative, HSV1 IgG POSITIVE, CMV IgG NEGATIVE, VARICELLA IgG POSITIVE     Active Studies: [id number], [id number]                                                                                                      Inactive Studies: [id number]                                                                                                                                                Optional Studies: None             Protocol: International Phase 3 trial in [place] 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      Treatment Plan: [id number](OS), AR-Arm B, Interim Maintenance, Day 32     Height: 1.680 m      Weight: 74.8 kg       BSA: 1.87 m²   (Start of Interim Maintenance 9/29/2022)                                                  "                                                                                          Performance Status: Karnofsky 100, ECOG  0 (updated 11/1/2022)     Treatment Plan Medications:  (verified 100% compliance)     Currently taking imatinib 400 mg PO QAM and 250 mg QPM  Currently taking mercaptopurine 1 tablet PO QHS Mon-Sat and 0.5 tablet PO QHS Sun     Evaluations / Study Labs:  (11/5/2022)     MTX PENDING     Therapy Given: (11/5/2022)     Leucovorin \"rescue\"  Mercaptopurine 1 tablet PO daily x 6 days and 0.5 tablets x 1 day (CONTINUED)  Imatinib 600 mg PO daily (CONTINUED)         Disposition: Discharge pending clearance of methotrexate (1-2 days?)       Discussed with nursing staff. At JULY's request, I prepared a treatment calendar for the month of November and also provided a written outline of the expected treatment over the next few months (including Delayed Intensification and Interim Maintenance #2).  Total time today approx 45 minutes.    ANN MARIE Rodriguez MD  Pediatric Hematology / Oncology  [facility]  Cell. [phone]  Office. [phone]    "

## 2022-11-05 NOTE — CARE PLAN
The patient is Stable - Low risk of patient condition declining or worsening    Shift Goals  Clinical Goals: IVF, monior lab results, VSS  Patient Goals: Rest  Family Goals: No family present    Progress made toward(s) clinical / shift goals:  Progressing    Problem: Psychosocial  Goal: Patient will experience minimized separation anxiety and fear  Outcome: Progressing     Problem: Discharge Barriers/Planning  Goal: Patient's continuum of care needs are met  Outcome: Progressing

## 2022-11-05 NOTE — CARE PLAN
Problem: Knowledge Deficit - Standard  Goal: Patient and family/care givers will demonstrate understanding of plan of care, disease process/condition, diagnostic tests and medications  Outcome: Progressing  Note: Patient updated on plan of care for the shift. All questions answered. Pt has call light available at bedside.      Problem: Urinary Elimination  Goal: Establish and maintain regular urinary output  Outcome: Progressing  Note: Pt voiding adequate amounts of urine this shift. UA collected per protocol.    The patient is Stable - Low risk of patient condition declining or worsening    Shift Goals  Clinical Goals: nausea control  Patient Goals: nausea control, rest  Family Goals: updates on plan    Progress made toward(s) clinical / shift goals:  progressing    Patient is not progressing towards the following goals:

## 2022-11-05 NOTE — DISCHARGE INSTRUCTIONS
PATIENT INSTRUCTIONS:      Given by:   Nurse    Instructed in:  If yes, include date/comment and person who did the instructions       A.D.L:       NA                Activity:      NA           Diet::          Yes       Continue eating a regular diet at home. Ensure you drink plenty of fluids to make sure you remain really hydrated.    Medication:  Yes     Continue all home medications as ordered by physician.     Equipment:  NA    Treatment:  NA      Other:          NA    Education Class:  NA    Patient/Family verbalized/demonstrated understanding of above Instructions:  yes  __________________________________________________________________________    OBJECTIVE CHECKLIST  Patient/Family has:    All medications brought from home   NA  Valuables from safe                            NA  Prescriptions                                       NA  All personal belongings                       Yes  Equipment (oxygen, apnea monitor, wheelchair)     NA  Other: NA    _________________________________________________________________________    Rehabilitation Follow-up: NA    Special Needs on Discharge (Specify) NA

## 2022-11-05 NOTE — PROGRESS NOTES
Received report from MONTSERRAT Berrios. Patient sleeping comfortably in bed, upper bed rails up, bed in lowest and locked position. No family at the bedside. Whiteboard updated.

## 2022-11-05 NOTE — PROGRESS NOTES
Pt demonstrates ability to turn self in bed without assistance of staff. Patient understands importance in prevention of skin breakdown, ulcers, and potential infection. Hourly rounding in effect. RN skin check complete.   Devices in place include: Chest port.  Skin assessed under devices: Yes.  Confirmed HAPI identified on the following date: N/A   Location of HAPI: N/A.  Wound Care RN following: No.

## 2022-11-09 ENCOUNTER — TELEPHONE (OUTPATIENT)
Dept: PEDIATRIC HEMATOLOGY/ONCOLOGY | Facility: OUTPATIENT CENTER | Age: 22
End: 2022-11-09

## 2022-11-09 ENCOUNTER — OFFICE VISIT (OUTPATIENT)
Dept: OPHTHALMOLOGY | Facility: MEDICAL CENTER | Age: 22
End: 2022-11-09
Payer: COMMERCIAL

## 2022-11-09 DIAGNOSIS — H49.22 LEFT ABDUCENS NERVE PALSY: ICD-10-CM

## 2022-11-09 DIAGNOSIS — C91.00 PHILADELPHIA CHROMOSOME POSITIVE ACUTE LYMPHOBLASTIC LEUKEMIA (HCC): ICD-10-CM

## 2022-11-09 DIAGNOSIS — H46.9 OPTIC NEUROPATHY: ICD-10-CM

## 2022-11-09 DIAGNOSIS — D69.6 THROMBOCYTOPENIA (HCC): ICD-10-CM

## 2022-11-09 PROCEDURE — 99213 OFFICE O/P EST LOW 20 MIN: CPT | Performed by: OPHTHALMOLOGY

## 2022-11-09 PROCEDURE — V2718 FRESNELL PRISM PRESS-ON LENS: HCPCS | Performed by: OPHTHALMOLOGY

## 2022-11-09 PROCEDURE — 92250 FUNDUS PHOTOGRAPHY W/I&R: CPT | Performed by: OPHTHALMOLOGY

## 2022-11-09 PROCEDURE — 92060 SENSORIMOTOR EXAMINATION: CPT | Performed by: OPHTHALMOLOGY

## 2022-11-09 ASSESSMENT — REFRACTION_WEARINGRX
OS_VPRISM: 6.0
SPECS_TYPE: SVL
OS_SPHERE: -7.50
OS_AXIS: 073
OS_CYLINDER: +1.75
OS_VBASE: DOWN
OD_SPHERE: -7.25
OD_SPHERE: -7.25
OD_CYLINDER: +2.75
OS_HPRISM: 12.25
OD_CYLINDER: +3.00
OD_AXIS: 094
OS_CYLINDER: +2.50
OS_SPHERE: -7.75
OS_AXIS: 087
OD_AXIS: 100
OS_HBASE: OUT

## 2022-11-09 ASSESSMENT — VISUAL ACUITY
OS_CC: 20/30
OS_CC+: -1
OD_CC: 20/20
METHOD: SNELLEN - LINEAR

## 2022-11-09 ASSESSMENT — REFRACTION_MANIFEST
OS_SPHERE: -7.50
OS_AXIS: 079
METHOD_AUTOREFRACTION: 1
OS_CYLINDER: +2.75
OD_CYLINDER: +3.00
OD_SPHERE: -7.00
OD_AXIS: 097

## 2022-11-09 ASSESSMENT — CONF VISUAL FIELD
OD_INFERIOR_NASAL_RESTRICTION: 0
OD_SUPERIOR_TEMPORAL_RESTRICTION: 0
OD_SUPERIOR_NASAL_RESTRICTION: 0
OS_NORMAL: 1
OS_SUPERIOR_TEMPORAL_RESTRICTION: 0
OS_INFERIOR_TEMPORAL_RESTRICTION: 0
OS_INFERIOR_NASAL_RESTRICTION: 0
OD_INFERIOR_TEMPORAL_RESTRICTION: 0
OD_NORMAL: 1
OS_SUPERIOR_NASAL_RESTRICTION: 0

## 2022-11-09 ASSESSMENT — REFRACTION_FINALRX
OD_HPRISM: 5
OS_HBASE: OUT
OS_HPRISM: 5
OD_HBASE: OUT

## 2022-11-09 ASSESSMENT — ENCOUNTER SYMPTOMS: BLURRED VISION: 1

## 2022-11-09 ASSESSMENT — TONOMETRY
IOP_METHOD: I-CARE
OD_IOP_MMHG: 15
OS_IOP_MMHG: 15

## 2022-11-09 ASSESSMENT — SLIT LAMP EXAM - LIDS
COMMENTS: NORMAL
COMMENTS: NORMAL

## 2022-11-09 ASSESSMENT — CUP TO DISC RATIO
OD_RATIO: 0.3
OS_RATIO: 0.3

## 2022-11-09 ASSESSMENT — EXTERNAL EXAM - RIGHT EYE: OD_EXAM: NORMAL

## 2022-11-09 ASSESSMENT — EXTERNAL EXAM - LEFT EYE: OS_EXAM: NORMAL

## 2022-11-09 NOTE — ASSESSMENT & PLAN NOTE
8/31/2022 - Having bone marrow biopsy tomorrow  11/9/2022 - End of march is end of treatment, one XRT left. Has port and still getting some MTX

## 2022-11-09 NOTE — PROGRESS NOTES
Peds/Neuro Ophthalmology:   Ephraim Carranza M.D.    Date & Time note created:    11/9/2022   3:47 PM     Referring MD / APRN:  Margarito Arvizu M.D., No att. providers found    Patient ID:  Name:             Tomas Jean-Baptiste   YOB: 2000  Age:                 22 y.o.  male   MRN:               [id number]    Chief Complaint/Reason for Visit:     Other (10 week follow up for Left abducens nerve palsy)      History of Present Illness:    JULY Jean-Baptiste is a 22 y.o. male   10 week follow for Left abducens nerve palsy. Pt states vision in the right is stable. With the left eye vision seems slightly better but still sees double vision far away. Pt has noticed some discomfort if he uses his phone and has his glasses his on. Has to take off glasses.       Review of Systems:  Review of Systems   Eyes:  Positive for blurred vision.        Left abducens nerve palsy   All other systems reviewed and are negative.    Past Medical History:   Past Medical History:   Diagnosis Date    Cancer (HCC)     Leukoma        Past Surgical History:  Past Surgical History:   Procedure Laterality Date    CATH PLACEMENT Right 8/29/2022    Procedure: INSERTION, CATHETER;  Surgeon: Simona Moise M.D.;  Location: SURGERY [facility];  Service: Ent       Current Outpatient Medications:  Current Outpatient Medications   Medication Sig Dispense Refill    imatinib (GLEEVEC) 400 MG tablet TAKE 1 TABLET BY MOUTH  DAILY WITH TWO 100MG (600MG TOTAL DOSE) 30 Tablet 3    imatinib (GLEEVEC) 100 MG tablet TAKE 2 TABLETS BY MOUTH  DAILY (WITH ONE 400MG TOTAL DOSE 600MG) 60 Tablet 3    mercaptopurine (PURINETHOL) 50 MG Tab Take 1 tablet by mouth Monday - Saturday.  Take 0.5 tablets by mouth on Sunday. (Patient taking differently: Take 25-50 mg by mouth every day. 50 mg on Monday-Saturday   25 mg on Sunday) 52 Tablet 0    chlorhexidine (PERIDEX) 0.12 % Solution Swish and spit 15 mL by mouth 2 times a day. 473 mL 2    vitamin D3  (CHOLECALCIFEROL) 1000 Unit (25 mcg) Tab Take 1,000 Units by mouth every day.      sulfamethoxazole-trimethoprim (BACTRIM) 400-80 MG Tab Take 2 tablets by mouth twice daily every Saturday and Sunday 40 Tablet 11    ondansetron (ZOFRAN ODT) 8 MG TABLET DISPERSIBLE Dissovle 1 Tablet by mouth every 8 hours as needed for Nausea. 20 Tablet 0    therapeutic multivitamin-minerals (THERAGRAN-M) Tab Take 1 Tab by mouth every day.       No current facility-administered medications for this visit.       Allergies:  Allergies   Allergen Reactions    Amoxicillin Rash     Reacted as an infant. No swelling or airway problems.       Family History:  Family History   Problem Relation Age of Onset    No Known Problems Mother     Stroke Maternal Grandmother     Diabetes Paternal Grandfather     Hypertension Paternal Grandfather     Hyperlipidemia Paternal Grandfather     Cancer Neg Hx     Heart Disease Neg Hx        Social History:  Social History     Socioeconomic History    Marital status: Single     Spouse name: Not on file    Number of children: Not on file    Years of education: Not on file    Highest education level: Not on file   Occupational History    Not on file   Tobacco Use    Smoking status: Never    Smokeless tobacco: Never   Vaping Use    Vaping Use: Never used   Substance and Sexual Activity    Alcohol use: Never    Drug use: Never    Sexual activity: Not Currently   Other Topics Concern    Behavioral problems Not Asked    Interpersonal relationships Not Asked    Sad or not enjoying activities Not Asked    Suicidal thoughts Not Asked    Poor school performance Not Asked    Reading difficulties Not Asked    Speech difficulties Not Asked    Writing difficulties Not Asked    Inadequate sleep Not Asked    Excessive TV viewing Not Asked    Excessive video game use Not Asked    Inadequate exercise Not Asked    Sports related Not Asked    Poor diet Not Asked    Family concerns for drug/alcohol abuse Not Asked    Poor oral  hygiene Not Asked    Bike safety Not Asked    Family concerns vehicle safety Not Asked   Social History Narrative    Not on file     Social Determinants of Health     Financial Resource Strain: Not on file   Food Insecurity: Not on file   Transportation Needs: Not on file   Physical Activity: Not on file   Stress: Not on file   Social Connections: Not on file   Intimate Partner Violence: Not on file   Housing Stability: Not on file          Physical Exam:  Physical Exam    Oriented x 3  Weight/BMI: There is no height or weight on file to calculate BMI.  There were no vitals taken for this visit.    Base Eye Exam       Visual Acuity (Snellen - Linear)         Right Left    Dist cc 20/20 20/30 -1    Dist ph cc NI NI              Tonometry (i-care, 2:51 PM)         Right Left    Pressure 15 15              Pupils         Pupils    Right PERRL    Left PERRL              Visual Fields         Right Left     Full Full              Neuro/Psych       Oriented x3: Yes    Mood/Affect: Normal              Dilation       Both eyes: able without dilation @ 3:36 PM                  Additional Tests       Color         Right Left    Ishihara 12/12 12/12                  Strabismus Exam       Correction: cc      Distance Near Near +3DS N Bifocals                      0 0 0   0 0 0                      ET 12 0  0  ET 10 0  0  ET 10                     0 0 0   0 0 0                       Slit Lamp and Fundus Exam       External Exam         Right Left    External Normal Normal              Slit Lamp Exam         Right Left    Lids/Lashes Normal Normal    Conjunctiva/Sclera White and quiet White and quiet    Cornea Clear Clear    Anterior Chamber Deep and quiet Deep and quiet    Iris Round and reactive Round and reactive    Lens Clear Clear    Vitreous Normal Normal              Fundus Exam         Right Left    Disc Normal Normal    C/D Ratio 0.3 0.3    Macula Normal Normal    Vessels Normal Normal    Periphery Normal Normal                   Refraction       Wearing Rx         Sphere Cylinder Axis Horz Prism Vert Prism    Right -7.25 +3.00 100      Left -7.75 +1.75 073 12.25 out 6.0 down              Wearing Rx #2         Sphere Cylinder Axis Horz Prism Vert Prism    Right -7.25 +2.75 094      Left -7.50 +2.50 087        Type: SVL              Manifest Refraction (Auto)         Sphere Cylinder Axis    Right -7.00 +3.00 097    Left -7.50 +2.75 079              Final Rx         Sphere Cylinder Axis Horz Prism    Right -7.25 +2.75 095 5 out    Left -7.25 +2.50 090 5 out      Type: SVL                    Pertinent Lab/Test/Imaging Review:      Assessment and Plan:     Left abducens nerve palsy  6/3/2022 - 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 Blurry vision secondary to asthenopia from overlapping images and acuity improves under monocular conditions. 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 Less likely infectious process given no papilledema, headaches or other meningeal signs. Could also be some form of ischemic process if BP fluctuations during induction. Recommend MRI orbits with citlali and repeat LP. Recommend that he could wear eye patch.   7/25/2022 - Has almost full abduction of the left eye, but has a relatively comitant esotropia. Uncertain if this is as the nerve heals, or being left with a comitant strabismus. In PAT did well wih a 12 base out prism OS. Will re-eval in 4 to 6 weeks and if stable will grind in or if improved will taper the prism  8/31/2022 - Doing will with the prism glasses. Showing some slight improvement. Measuring 10 diopters today. Thus will continue to monitor   11/9/2022 - stable at around 10 prisms. Placed new press on prism. However has vision benefit and considering new rx, so will give with 5 base out to grind in.  Discussed waiting 8 months to a year prior to strabismus surgical repair    Thrombocytopenia (HCC)  7/15/2022 - Today has evidence of intraretinal flame and dot hemorrhages, white centered  hemorrhages and some cotton wool spots. No nerve involvement and OCT NFL thickness normal at 100 OD and 98 OS. Hemorrhages not involving macula.   8/31/2022 - Resolution of intraretinal hemorrhages and cotton wool spot. OCT NFL thickness stable at 97 OD and 96 OS  11/9/2022 -resolution of intraretinal hemorrhages, however OCT nerve fiber layer thickness slightly less than last visit at 88 OD and 85 OS.  In retrospect may have had some mild optic nerve swelling back in July but is slowly improving.  Intraocular pressure remained stable.    [place] chromosome positive acute lymphoblastic leukemia (HCC)  8/31/2022 - Having bone marrow biopsy tomorrow  11/9/2022 - End of march is end of treatment, one XRT left. Has port and still getting some MTX        Ephraim Carranza M.D.

## 2022-11-09 NOTE — ASSESSMENT & PLAN NOTE
7/15/2022 - Today has evidence of intraretinal flame and dot hemorrhages, white centered hemorrhages and some cotton wool spots. No nerve involvement and OCT NFL thickness normal at 100 OD and 98 OS. Hemorrhages not involving macula.   8/31/2022 - Resolution of intraretinal hemorrhages and cotton wool spot. OCT NFL thickness stable at 97 OD and 96 OS  11/9/2022 -resolution of intraretinal hemorrhages, however OCT nerve fiber layer thickness slightly less than last visit at 88 OD and 85 OS.  In retrospect may have had some mild optic nerve swelling back in July but is slowly improving.  Intraocular pressure remained stable.

## 2022-11-09 NOTE — ASSESSMENT & PLAN NOTE
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 Blurry vision secondary to asthenopia from overlapping images and acuity improves under monocular conditions. 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 Less likely infectious process given no papilledema, headaches or other meningeal signs. Could also be some form of ischemic process if BP fluctuations during induction. Recommend MRI orbits with citlali and repeat LP. Recommend that he could wear eye patch.   7/25/2022 - Has almost full abduction of the left eye, but has a relatively comitant esotropia. Uncertain if this is as the nerve heals, or being left with a comitant strabismus. In PAT did well wih a 12 base out prism OS. Will re-eval in 4 to 6 weeks and if stable will grind in or if improved will taper the prism  8/31/2022 - Doing will with the prism glasses. Showing some slight improvement. Measuring 10 diopters today. Thus will continue to monitor   11/9/2022 - stable at around 10 prisms. Placed new press on prism. However has vision benefit and considering new rx, so will give with 5 base out to grind in.  Discussed waiting 8 months to a year prior to strabismus surgical repair

## 2022-11-09 NOTE — TELEPHONE ENCOUNTER
Called the Pediatric floor and talked to Sujatha to let her know that JULY will be admitted to the floor on 11/14.

## 2022-11-11 RX ORDER — ONDANSETRON 2 MG/ML
8 INJECTION INTRAMUSCULAR; INTRAVENOUS ONCE
Status: CANCELLED | OUTPATIENT
Start: 2022-11-16

## 2022-11-11 RX ORDER — LIDOCAINE AND PRILOCAINE 25; 25 MG/G; MG/G
CREAM TOPICAL PRN
Status: CANCELLED | OUTPATIENT
Start: 2022-11-16

## 2022-11-11 RX ORDER — ONDANSETRON 2 MG/ML
8 INJECTION INTRAMUSCULAR; INTRAVENOUS EVERY 8 HOURS
Status: CANCELLED | OUTPATIENT
Start: 2022-11-16

## 2022-11-11 RX ORDER — SODIUM CHLORIDE 9 MG/ML
500 INJECTION, SOLUTION INTRAVENOUS CONTINUOUS
Status: CANCELLED | OUTPATIENT
Start: 2022-11-16

## 2022-11-11 RX ORDER — ONDANSETRON 2 MG/ML
8 INJECTION INTRAMUSCULAR; INTRAVENOUS EVERY 8 HOURS PRN
Status: CANCELLED | OUTPATIENT
Start: 2022-11-16

## 2022-11-11 RX ORDER — LORAZEPAM 2 MG/ML
1 CONCENTRATE ORAL EVERY 6 HOURS PRN
Status: CANCELLED | OUTPATIENT
Start: 2022-11-16

## 2022-11-11 RX ORDER — SODIUM CHLORIDE 9 MG/ML
20 INJECTION, SOLUTION INTRAVENOUS PRN
Status: CANCELLED | OUTPATIENT
Start: 2022-11-16

## 2022-11-11 RX ORDER — DEXTROSE AND SODIUM CHLORIDE 5; .45 G/100ML; G/100ML
INJECTION, SOLUTION INTRAVENOUS ONCE
Status: CANCELLED | OUTPATIENT
Start: 2022-11-16 | End: 2022-11-16

## 2022-11-11 RX ORDER — PROMETHAZINE HYDROCHLORIDE 6.25 MG/5ML
0.25 SYRUP ORAL EVERY 6 HOURS PRN
Status: CANCELLED | OUTPATIENT
Start: 2022-11-16

## 2022-11-14 ENCOUNTER — HOSPITAL ENCOUNTER (OUTPATIENT)
Dept: INFUSION CENTER | Facility: MEDICAL CENTER | Age: 22
End: 2022-11-14
Attending: PEDIATRICS
Payer: COMMERCIAL

## 2022-11-14 ENCOUNTER — HOSPITAL ENCOUNTER (INPATIENT)
Facility: MEDICAL CENTER | Age: 22
LOS: 4 days | DRG: 837 | End: 2022-11-18
Attending: PEDIATRICS | Admitting: PEDIATRICS
Payer: COMMERCIAL

## 2022-11-14 VITALS
TEMPERATURE: 99.1 F | HEIGHT: 66 IN | DIASTOLIC BLOOD PRESSURE: 83 MMHG | BODY MASS INDEX: 26.68 KG/M2 | WEIGHT: 166.01 LBS | HEART RATE: 108 BPM | SYSTOLIC BLOOD PRESSURE: 127 MMHG | RESPIRATION RATE: 18 BRPM | OXYGEN SATURATION: 96 %

## 2022-11-14 DIAGNOSIS — Z51.11 CHEMOTHERAPY MANAGEMENT, ENCOUNTER FOR: ICD-10-CM

## 2022-11-14 DIAGNOSIS — C91.01 ALL (ACUTE LYMPHOID LEUKEMIA) IN REMISSION (HCC): ICD-10-CM

## 2022-11-14 DIAGNOSIS — C91.00 PHILADELPHIA CHROMOSOME POSITIVE ACUTE LYMPHOBLASTIC LEUKEMIA (ALL) (HCC): ICD-10-CM

## 2022-11-14 DIAGNOSIS — Z51.11 ENCOUNTER FOR CHEMOTHERAPY MANAGEMENT: ICD-10-CM

## 2022-11-14 LAB
ALBUMIN SERPL BCP-MCNC: 4.8 G/DL (ref 3.2–4.9)
ALBUMIN/GLOB SERPL: 2.4 G/DL
ALP SERPL-CCNC: 65 U/L (ref 30–99)
ALT SERPL-CCNC: 74 U/L (ref 2–50)
ANION GAP SERPL CALC-SCNC: 11 MMOL/L (ref 7–16)
APPEARANCE UR: ABNORMAL
APPEARANCE UR: CLEAR
APPEARANCE UR: CLEAR
AST SERPL-CCNC: 25 U/L (ref 12–45)
BACTERIA #/AREA URNS HPF: NEGATIVE /HPF
BASOPHILS # BLD AUTO: 0.5 % (ref 0–1.8)
BASOPHILS # BLD: 0.01 K/UL (ref 0–0.12)
BILIRUB CONJ SERPL-MCNC: <0.2 MG/DL (ref 0.1–0.5)
BILIRUB INDIRECT SERPL-MCNC: NORMAL MG/DL (ref 0–1)
BILIRUB SERPL-MCNC: 0.3 MG/DL (ref 0.1–1.5)
BILIRUB UR QL STRIP.AUTO: NEGATIVE
BUN SERPL-MCNC: 11 MG/DL (ref 8–22)
CALCIUM SERPL-MCNC: 8.8 MG/DL (ref 8.5–10.5)
CAOX CRY #/AREA URNS HPF: ABNORMAL /HPF
CHLORIDE SERPL-SCNC: 105 MMOL/L (ref 96–112)
CO2 SERPL-SCNC: 23 MMOL/L (ref 20–33)
COLOR UR: YELLOW
CREAT SERPL-MCNC: 0.77 MG/DL (ref 0.5–1.4)
EOSINOPHIL # BLD AUTO: 0.02 K/UL (ref 0–0.51)
EOSINOPHIL NFR BLD: 1 % (ref 0–6.9)
EPI CELLS #/AREA URNS HPF: ABNORMAL /HPF
ERYTHROCYTE [DISTWIDTH] IN BLOOD BY AUTOMATED COUNT: 55.1 FL (ref 35.9–50)
GFR SERPLBLD CREATININE-BSD FMLA CKD-EPI: 131 ML/MIN/1.73 M 2
GLOBULIN SER CALC-MCNC: 2 G/DL (ref 1.9–3.5)
GLUCOSE SERPL-MCNC: 104 MG/DL (ref 65–99)
GLUCOSE UR STRIP.AUTO-MCNC: NEGATIVE MG/DL
HCT VFR BLD AUTO: 33.4 % (ref 42–52)
HGB BLD-MCNC: 11 G/DL (ref 14–18)
HYALINE CASTS #/AREA URNS LPF: ABNORMAL /LPF
IMM GRANULOCYTES # BLD AUTO: 0.01 K/UL (ref 0–0.11)
IMM GRANULOCYTES NFR BLD AUTO: 0.5 % (ref 0–0.9)
KETONES UR STRIP.AUTO-MCNC: ABNORMAL MG/DL
KETONES UR STRIP.AUTO-MCNC: NEGATIVE MG/DL
KETONES UR STRIP.AUTO-MCNC: NEGATIVE MG/DL
LEUKOCYTE ESTERASE UR QL STRIP.AUTO: NEGATIVE
LYMPHOCYTES # BLD AUTO: 0.63 K/UL (ref 1–4.8)
LYMPHOCYTES NFR BLD: 31.7 % (ref 22–41)
MCH RBC QN AUTO: 33.8 PG (ref 27–33)
MCHC RBC AUTO-ENTMCNC: 32.9 G/DL (ref 33.7–35.3)
MCV RBC AUTO: 102.8 FL (ref 81.4–97.8)
MICRO URNS: ABNORMAL
MICRO URNS: NORMAL
MICRO URNS: NORMAL
MONOCYTES # BLD AUTO: 0.25 K/UL (ref 0–0.85)
MONOCYTES NFR BLD AUTO: 12.6 % (ref 0–13.4)
NEUTROPHILS # BLD AUTO: 1.07 K/UL (ref 1.82–7.42)
NEUTROPHILS NFR BLD: 53.7 % (ref 44–72)
NITRITE UR QL STRIP.AUTO: NEGATIVE
NRBC # BLD AUTO: 0 K/UL
NRBC BLD-RTO: 0 /100 WBC
PH UR STRIP.AUTO: 5.5 [PH] (ref 5–8)
PLATELET # BLD AUTO: 106 K/UL (ref 164–446)
PMV BLD AUTO: 10.4 FL (ref 9–12.9)
POTASSIUM SERPL-SCNC: 3.6 MMOL/L (ref 3.6–5.5)
PROT SERPL-MCNC: 6.8 G/DL (ref 6–8.2)
PROT UR QL STRIP: 30 MG/DL
PROT UR QL STRIP: NEGATIVE MG/DL
PROT UR QL STRIP: NEGATIVE MG/DL
RBC # BLD AUTO: 3.25 M/UL (ref 4.7–6.1)
RBC # URNS HPF: ABNORMAL /HPF
RBC UR QL AUTO: NEGATIVE
RENAL EPI CELLS #/AREA URNS HPF: NEGATIVE /HPF
SODIUM SERPL-SCNC: 139 MMOL/L (ref 135–145)
SP GR UR STRIP.AUTO: 1
SP GR UR STRIP.AUTO: 1.01
SP GR UR STRIP.AUTO: 1.03
UROBILINOGEN UR STRIP.AUTO-MCNC: 0.2 MG/DL
UROBILINOGEN UR STRIP.AUTO-MCNC: 0.2 MG/DL
UROBILINOGEN UR STRIP.AUTO-MCNC: 1 MG/DL
WBC # BLD AUTO: 2 K/UL (ref 4.8–10.8)
WBC #/AREA URNS HPF: ABNORMAL /HPF

## 2022-11-14 PROCEDURE — 80053 COMPREHEN METABOLIC PANEL: CPT

## 2022-11-14 PROCEDURE — 700111 HCHG RX REV CODE 636 W/ 250 OVERRIDE (IP): Performed by: PEDIATRICS

## 2022-11-14 PROCEDURE — 81001 URINALYSIS AUTO W/SCOPE: CPT

## 2022-11-14 PROCEDURE — 700102 HCHG RX REV CODE 250 W/ 637 OVERRIDE(OP): Performed by: PEDIATRICS

## 2022-11-14 PROCEDURE — 770000 HCHG ROOM/CARE - INTERMEDIATE ICU *

## 2022-11-14 PROCEDURE — 81003 URINALYSIS AUTO W/O SCOPE: CPT | Mod: 91

## 2022-11-14 PROCEDURE — 99232 SBSQ HOSP IP/OBS MODERATE 35: CPT | Performed by: PEDIATRICS

## 2022-11-14 PROCEDURE — 85025 COMPLETE CBC W/AUTO DIFF WBC: CPT

## 2022-11-14 PROCEDURE — A4212 NON CORING NEEDLE OR STYLET: HCPCS

## 2022-11-14 PROCEDURE — 700105 HCHG RX REV CODE 258: Performed by: PEDIATRICS

## 2022-11-14 PROCEDURE — 36591 DRAW BLOOD OFF VENOUS DEVICE: CPT

## 2022-11-14 PROCEDURE — A9270 NON-COVERED ITEM OR SERVICE: HCPCS | Performed by: PEDIATRICS

## 2022-11-14 PROCEDURE — 82248 BILIRUBIN DIRECT: CPT

## 2022-11-14 RX ORDER — PROMETHAZINE HYDROCHLORIDE 6.25 MG/5ML
0.25 SYRUP ORAL EVERY 6 HOURS PRN
Status: DISCONTINUED | OUTPATIENT
Start: 2022-11-14 | End: 2022-11-18

## 2022-11-14 RX ORDER — ONDANSETRON 2 MG/ML
8 INJECTION INTRAMUSCULAR; INTRAVENOUS ONCE
Status: COMPLETED | OUTPATIENT
Start: 2022-11-14 | End: 2022-11-15

## 2022-11-14 RX ORDER — MERCAPTOPURINE 50 MG/1
50 TABLET ORAL
Status: DISCONTINUED | OUTPATIENT
Start: 2022-11-14 | End: 2022-11-18 | Stop reason: HOSPADM

## 2022-11-14 RX ORDER — LORAZEPAM 2 MG/ML
1 CONCENTRATE ORAL EVERY 6 HOURS PRN
Status: DISCONTINUED | OUTPATIENT
Start: 2022-11-14 | End: 2022-11-18

## 2022-11-14 RX ORDER — ONDANSETRON 2 MG/ML
8 INJECTION INTRAMUSCULAR; INTRAVENOUS EVERY 8 HOURS
Status: DISCONTINUED | OUTPATIENT
Start: 2022-11-14 | End: 2022-11-18

## 2022-11-14 RX ORDER — IMATINIB MESYLATE 100 MG/1
200 TABLET, FILM COATED ORAL ONCE
Status: COMPLETED | OUTPATIENT
Start: 2022-11-14 | End: 2022-11-14

## 2022-11-14 RX ORDER — CHLORHEXIDINE GLUCONATE ORAL RINSE 1.2 MG/ML
15 SOLUTION DENTAL 2 TIMES DAILY
Status: DISCONTINUED | OUTPATIENT
Start: 2022-11-14 | End: 2022-11-18 | Stop reason: HOSPADM

## 2022-11-14 RX ORDER — DEXTROSE AND SODIUM CHLORIDE 5; .45 G/100ML; G/100ML
INJECTION, SOLUTION INTRAVENOUS ONCE
Status: ACTIVE | OUTPATIENT
Start: 2022-11-14 | End: 2022-11-15

## 2022-11-14 RX ORDER — SODIUM CHLORIDE 9 MG/ML
500 INJECTION, SOLUTION INTRAVENOUS CONTINUOUS
Status: DISCONTINUED | OUTPATIENT
Start: 2022-11-14 | End: 2022-11-18

## 2022-11-14 RX ORDER — ONDANSETRON 2 MG/ML
8 INJECTION INTRAMUSCULAR; INTRAVENOUS EVERY 8 HOURS PRN
Status: DISCONTINUED | OUTPATIENT
Start: 2022-11-14 | End: 2022-11-18

## 2022-11-14 RX ORDER — LIDOCAINE AND PRILOCAINE 25; 25 MG/G; MG/G
CREAM TOPICAL PRN
Status: DISCONTINUED | OUTPATIENT
Start: 2022-11-14 | End: 2022-11-18 | Stop reason: HOSPADM

## 2022-11-14 RX ORDER — SODIUM CHLORIDE 9 MG/ML
20 INJECTION, SOLUTION INTRAVENOUS PRN
Status: ACTIVE | OUTPATIENT
Start: 2022-11-14 | End: 2022-11-15

## 2022-11-14 RX ORDER — MERCAPTOPURINE 50 MG/1
25-50 TABLET ORAL DAILY
COMMUNITY
End: 2022-12-27

## 2022-11-14 RX ADMIN — SODIUM BICARBONATE: 84 INJECTION, SOLUTION INTRAVENOUS at 16:02

## 2022-11-14 RX ADMIN — 0.12% CHLORHEXIDINE GLUCONATE 15 ML: 1.2 RINSE ORAL at 16:36

## 2022-11-14 RX ADMIN — SODIUM BICARBONATE: 84 INJECTION, SOLUTION INTRAVENOUS at 20:34

## 2022-11-14 RX ADMIN — SODIUM BICARBONATE: 84 INJECTION, SOLUTION INTRAVENOUS at 11:35

## 2022-11-14 RX ADMIN — SODIUM CHLORIDE 1496 ML: 9 INJECTION, SOLUTION INTRAVENOUS at 10:21

## 2022-11-14 RX ADMIN — MERCAPTOPURINE 50 MG: 50 TABLET ORAL at 21:04

## 2022-11-14 RX ADMIN — IMATINIB MESYLATE 200 MG: 100 TABLET, FILM COATED ORAL at 21:04

## 2022-11-14 ASSESSMENT — FIBROSIS 4 INDEX
FIB4 SCORE: 0.64
FIB4 SCORE: 0.58

## 2022-11-14 ASSESSMENT — LIFESTYLE VARIABLES
AVERAGE NUMBER OF DAYS PER WEEK YOU HAVE A DRINK CONTAINING ALCOHOL: 0
HOW MANY TIMES IN THE PAST YEAR HAVE YOU HAD 5 OR MORE DRINKS IN A DAY: 0
EVER HAD A DRINK FIRST THING IN THE MORNING TO STEADY YOUR NERVES TO GET RID OF A HANGOVER: NO
ALCOHOL_USE: NO
EVER FELT BAD OR GUILTY ABOUT YOUR DRINKING: NO
TOTAL SCORE: 0
HAVE YOU EVER FELT YOU SHOULD CUT DOWN ON YOUR DRINKING: NO
CONSUMPTION TOTAL: NEGATIVE
ON A TYPICAL DAY WHEN YOU DRINK ALCOHOL HOW MANY DRINKS DO YOU HAVE: 0
TOTAL SCORE: 0
HAVE PEOPLE ANNOYED YOU BY CRITICIZING YOUR DRINKING: NO
TOTAL SCORE: 0

## 2022-11-14 ASSESSMENT — PAIN DESCRIPTION - PAIN TYPE
TYPE: ACUTE PAIN

## 2022-11-14 NOTE — PROGRESS NOTES
Pt admitted to room 417. Admission profile completed with help from patient. Pt updated on plan of care. All questions answered. Pt resting comfortably in bed at this time.

## 2022-11-14 NOTE — H&P
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 equal, round.  Negative.  Neck: Normal range of motion of neck, no adenopathy.    Cardiovascular: Normal rate, regular rhythm and normal heart sounds.  No murmur heard. DP/radial pulses 2+, cap refill < 2 sec.  Pulmonary/Chest: Effort normal and breath sounds normal. No respiratory distress. Symmetric expansion.  No crackles or wheezes.  Abdomen: Soft. Bowel sounds are normal. No distension and no mass. There is no hepatosplenomegaly.    Genitourinary:  Deferred.  Musculoskeletal: Normal range of motion of lower and upper extremities bilaterally.    Neurological: Alert and oriented to person and place. Exhibits normal muscle tone bilaterally in upper and lower extremities. Gait normal. Coordination normal.    Skin: Skin is warm, dry and pink.  No rash or evidence of skin infection.  No pallor.   Psychiatric: Mood and affect normal for age.    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 and whose End of Induction IB MRD was both negative by flow cytometry and PCR who presents for scheduled chemotherapy readiness, Interim Maintenance, Day 43     1) Ph+ Precursor B-Cell Acute Lymphoblastic Leukemia, in MRD Remission:              - 2-3 weeks of symptoms              - Presenting WBC > 440 k/uL, hyperleukocytosis              - Start of Hydroxyurea (1500 mg PO Q8) 2320 on 5/27/2022  - discontinued after only 55 hours              - No steroid pretreatment              - 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              - Testicular status NEGATIVE                   - Flow cytometry of both 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, FISH positive for BCR-ABL1 translocation              - Enrolled on [facility] [id number]              - Initially enrolled on [facility] [id number] - but 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                            - Enrolled on [facility] [id number] and began study 6/13/2022              - Started imatinib therapy 6/3/2022 (split dosing of imatinib 400 mg PO QAM and 200 mg PO QPM) - continued at Day 15 of Induction 1A with imatinib 600 mg PO daily - remains 100% compliant with imatinib              - End of Induction IA and IB MRD negative                         - WBC 2.0, Hgb 11.0, platelets 106  - ANC 1070,   - Creatinine 0.77 (baseline 0.5-0.6)  - AST 25, ALT 74, bilirubin total 0.3                  - No acute dose-limiting toxicities.  ANC 1070, platelets 106 and in meeting with criteria to proceed with Day 43 high-dose methotrexate.  No mucositis.  No Bactrim in the past 72 hours, no NSAIDs in the past 72 hours.                  - Will admit for Day 43 of Interim Maintenance however given that creatinine is > 125 at baseline,% we will begin with overnight prehydration and begin methotrexate AM                [id number], AR Arm B, Interim  Maintenance, Day 43:        ** Vincristine 2 mg IV x 1 dose (TOMORROW)              ** Methotrexate 935 IV over 30 minutes (TOMORROW)              ** Methotrexate 8415 mg IV Over 24.5 hours starting on Day 43[44] (TOMORROW)              ** Leucovorin 28 mg IV Q6H starting at hour 42 from start of methotrexate              **Continue mercaptopurine 50 mg = 1 tab PO  x 6 days of the week, 25 mg = 0.5 tabs x 1 day/week)               ** Took Imatinib 400 mg PO this AM - will administer an addition 200 mg dose this evening (no splitting tabs while in hospital)  ** Starting tomorrow will administer Imatinib 600 mg PO QAM until discharged      - IV fluids per protocol, prehydration x 2 hours and until urine specific gravity < 1.010 and pH > 7   - D5 1/4 NS + 30 mEq sodium bicarbonate at 250 ml/hr (=125 ml/m2/hr)   - Will monitor MTX levels at 24hr, (36 hr if indicated) 42hr, 48hr then daily until MTX <0.1 uM                2) Chemotherapy Related Pancytopenia:  - WBC 2.0, Hgb 11.0, platelets 106  - ANC 1070,   - No transfusion indicated     3) Sixth Cranial Nerve Palsy (IMPROVED/RESOLVED):             - Followed by Dr. Carranza             - Improvement/Resolution of palsy.     4) At Risk of Opportunistic Lung Infection:             - Holding Bactrim this past weekend, can resume 72 hours after clearance of methotrexate     5) Anxiety (IMPROVED GREATLY):     6) Social:             - Continue with support             - Continue school as tolerated     7) Access:               - R Port-A-Cath in place      8) Research Participant:           [facility] - Source Data         Diagnosis: Ph+ Precursor B-Cell Acute Lymphoblastic Leukemia     Disease Status: EOI1A MRD NEGATIVE, EOI1B RD NEGATIVE, CNS3c, testicular negative, HSV1 IgG POSITIVE, CMV IgG NEGATIVE, VARICELLA IgG POSITIVE     Active Studies: [id number], [id number]                                                                                                       Inactive Studies: [id number]                                                                                                                                                Optional Studies: None             Protocol: International Phase 3 trial in [place] 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      Treatment Plan: [id number](OS), AR-Arm B, Interim Maintenance, Day 43     Height: 1.680 m      Weight: 74.8 kg       BSA: 1.87 m²   (Start of Interim Maintenance 9/29/2022)                                                                                                                                           Performance Status: Karnofsky 100, ECOG  0 (updated 11/14/2022)     Treatment Plan Medications:  (verified 100% compliance)     Currently taking imatinib 400 mg PO QAM and 250 mg QPM  Currently taking mercaptopurine 1 tablet PO QHS Mon-Sat and 0.5 tablet PO QHS Sun     Evaluations / Study Labs:  (11/14/2022)     WBC 2.0, Hgb 11.0, platelets 106  ANC 1070,   Creatinine 0.77   AST 25, ALT 74, bilirubin total 0.3     Therapy Given: (11/14/2022)     Vincristine 2 mg IV (TO BE GIVEN)  Methotrexate 9350 mg IV (5 g/m²)  (TO BE GIVEN)  Mercaptopurine 1 tablet PO daily x 6 days and 0.5 tablets x 1 day (CONTINUED)  Imatinib 600 mg PO daily (CONTINUED)         Disposition: Admit to inpatient for High-dose Methotrexate, discharge pending clearance of methotrexate     Pepe Faye MD  Pediatric Hematology / Oncology  [facility]  Cell.  [phone]  Office. [phone]

## 2022-11-14 NOTE — LETTER
Physician Notification of Admission      To: Margarito Arvizu M.D.    [address]  [place] [address]    From: Pepe Faye M.D.    Re: Tomas Jean-Baptiste, 2000    Admitted on: 11/14/2022  9:31 AM    Admitting Diagnosis:    Acute Lympohoblastic Leukemia    Dear Margarito Arvizu M.D.,      Our records indicate that we have admitted a patient to [facility] Pediatric[facility] department who has listed you as their primary care provider, and we wanted to make sure you were aware of this admission. We strive to improve patient care by facilitating active communication with our medical colleagues from around the region.    To speak with a member of the patients care team, please contact the [facility] Pediatric department at [phone].   Thank you for allowing us to participate in the care of your patient.

## 2022-11-14 NOTE — PROGRESS NOTES
"Pharmacy Chemotherapy Calculations    Patient Name: Tomas Roy \"JULY\" Estiven     Diagnosis: Ph+ Precursor B-Cell ALL    Protocol: [id number] (On Study, Arm B) - COG ID number: [id number]      Allergies: Amoxicillin       /76   Pulse 99   Temp 36.8 °C (98.2 °F) (Temporal)   Resp 18   Ht 1.651 m (5' 5\")   Wt 75.4 kg (166 lb 3.6 oz)   SpO2 97%   BMI 27.66 kg/m²  Body surface area is 1.86 meters squared.     Labs 11/14/22 are within treatment plan parameters, except Scr.   Do not start chemotherapy until prehydration has run through morning labs on 11/15 per MD orders and urine parameters are met.     Drug Order   (Drug name, dose, route, IV Fluid & volume, frequency, number of doses) Interim Maintenance, Day 43- to begin in am 11/15/22  Previous treatment: IM D29 11/1/22   Medication = High Dose Methotrexate PF  Base Dose = 5,000 mg/m2 = 500 mg/m2 followed by 4,500 mg/m2   Calc Dose 1: 500 mg/m2 x 1.86m2 = 930mg   Calc Dose 2: 4,500 mg/m2 x 1.86 m2 = 8370mg  Final Dose 1 = 935mg   Final Dose 2 = 8415mg  Route = IV  Fluid & Volume 1 = D5W 50mL over 30min  Fluid & Volume 2 = D5W 500mL over 23.5hrs   Do not start until urine SpG < 1.010 and pH > 7      <10% difference, OK to treat with final dose     Medication = Vincristine (ONCOVIN)  Base Dose = 1.5 mg/m2   Calc Dose: 1.5 mg/m2 x 1.88m2 = >2 mg  Final Dose = 2 mg  Route = IV  Fluid & Volume = NS 25 mL   Admin Duration = Over 5 - 10 minutes          OK to treat with MAX dose     Medication = leucovorin  Base Dose = 15mg/m2   Calc Dose: 1.5 mg/m2 x 1.86m2 = 27.9mg  Final Dose = 28.1mg  Route = IV  Fluid & Volume = D5W  25 mL   Admin Duration = Over 10min   Q6hrs, Start 42 hrs after MTX start time    <10% difference, ok to treat with final dose     By my signature below, I confirm this process was performed independently with the BSA and all final chemotherapy dosing calculations congruent. I have reviewed the above chemotherapy order and that my calculation " of the final dose and BSA (when applicable) corroborate those calculations of the  pharmacist. Discrepancies of 10% or greater in the written dose have been addressed and documented within the EPIC Progress notes.    AJE Wilson Pharm.D.

## 2022-11-14 NOTE — PROGRESS NOTES
"Pharmacy Chemotherapy Calculations Note:    Dx: B-ALL, PH+    Cycle: Interim Maintenance Day 43   On Study - COG ID number: [id number]  Previous treatment: IM Day 15 on 10/17/22    Protocol: HD MTX Interim Maintenance SR Arm B (Investigational COG Arm) as per [id number]          Ht 1.651 m (5' 5\")   Wt 75.4 kg (166 lb 3.6 oz)   BMI 27.66 kg/m²  Body surface area is 1.86 meters squared.  IM Dosing Values:   Ht 168 cm Wt 74.8 kg BSA 1.87 m²    Labs within parameters for treatment 11/14 per treatment plan/road map (except SCr, see plan to hydrate until 11/15 per MD)    VinCRIStine (Oncovin) 1.5 mg/m² x 1.87 m² = 2.8 mg   Max 2 mg, okay to treat with final dose = 2 mg dose capped    Do not start until prehydration has run through morning labs on 11/15 per MD discussion, must also meet parameters urine SpG < 1.010 and pH > 7  HD Methotrexate 5000 mg/m² given as:     Methotrexate 500 mg/m² x 1.87 m² = 935 mg   <10% difference, okay to treat with final dose = 935 mg IV over 30 min     Methotrexate 4500 mg/m² x 1.87 m² = 8415 mg   <10% difference, okay to treat with final dose = 8415 mg IV over 23.5 hrs    Leucovorin 15 mg/m² x 1.87 m² = 28.1 mg   <10% difference, okay to treat with final dose = 28.1 mg IV starting at hour 42 after start of MTX infusion      Cherrie Decker, PharmD, BCOP, BCPS      "

## 2022-11-14 NOTE — PROGRESS NOTES
Pt to [facility] for lab draw and  Visit prior to admission. Afebrile.  VSS.  Awake and alert in no acute distress.  Port accessed with 22 g 3/4in   and labs drawn from the port without difficulty / with 1 attempt.   Pt tolerated well.  Visit with Dr. Faye completed. Pt met parameters to start inpt chemo. Pt ambulated to Peds floor in stable condition.

## 2022-11-15 PROBLEM — Z95.828 PORT-A-CATH IN PLACE: Status: ACTIVE | Noted: 2022-11-15

## 2022-11-15 PROBLEM — Z91.89 AT RISK FOR NAUSEA AND VOMITING: Status: ACTIVE | Noted: 2022-11-15

## 2022-11-15 PROBLEM — Z91.89 AT RISK FOR OPPORTUNISTIC INFECTIONS: Status: ACTIVE | Noted: 2022-11-15

## 2022-11-15 LAB
ANION GAP SERPL CALC-SCNC: 9 MMOL/L (ref 7–16)
APPEARANCE UR: CLEAR
BACTERIA #/AREA URNS HPF: NEGATIVE /HPF
BILIRUB UR QL STRIP.AUTO: NEGATIVE
BUN SERPL-MCNC: 7 MG/DL (ref 8–22)
CALCIUM SERPL-MCNC: 7.6 MG/DL (ref 8.5–10.5)
CHLORIDE SERPL-SCNC: 105 MMOL/L (ref 96–112)
CO2 SERPL-SCNC: 23 MMOL/L (ref 20–33)
COLOR UR: ABNORMAL
COLOR UR: YELLOW
CREAT SERPL-MCNC: 0.7 MG/DL (ref 0.5–1.4)
EPI CELLS #/AREA URNS HPF: NEGATIVE /HPF
GFR SERPLBLD CREATININE-BSD FMLA CKD-EPI: 134 ML/MIN/1.73 M 2
GLUCOSE SERPL-MCNC: 260 MG/DL (ref 65–99)
GLUCOSE UR STRIP.AUTO-MCNC: NEGATIVE MG/DL
HYALINE CASTS #/AREA URNS LPF: ABNORMAL /LPF
KETONES UR STRIP.AUTO-MCNC: NEGATIVE MG/DL
LEUKOCYTE ESTERASE UR QL STRIP.AUTO: NEGATIVE
MICRO URNS: ABNORMAL
MICRO URNS: NORMAL
NITRITE UR QL STRIP.AUTO: NEGATIVE
PH UR STRIP.AUTO: 7 [PH] (ref 5–8)
PH UR STRIP.AUTO: 7.5 [PH] (ref 5–8)
PH UR STRIP.AUTO: 7.5 [PH] (ref 5–8)
PH UR STRIP.AUTO: 8 [PH] (ref 5–8)
PH UR STRIP.AUTO: 8 [PH] (ref 5–8)
POTASSIUM SERPL-SCNC: 3.4 MMOL/L (ref 3.6–5.5)
PROT UR QL STRIP: 30 MG/DL
PROT UR QL STRIP: NEGATIVE MG/DL
RBC # URNS HPF: ABNORMAL /HPF
RBC UR QL AUTO: NEGATIVE
SODIUM SERPL-SCNC: 137 MMOL/L (ref 135–145)
SP GR UR STRIP.AUTO: 1.01
UROBILINOGEN UR STRIP.AUTO-MCNC: 0.2 MG/DL
WBC #/AREA URNS HPF: ABNORMAL /HPF

## 2022-11-15 PROCEDURE — A9270 NON-COVERED ITEM OR SERVICE: HCPCS | Performed by: PEDIATRICS

## 2022-11-15 PROCEDURE — 80048 BASIC METABOLIC PNL TOTAL CA: CPT

## 2022-11-15 PROCEDURE — 700111 HCHG RX REV CODE 636 W/ 250 OVERRIDE (IP): Mod: JW | Performed by: PEDIATRICS

## 2022-11-15 PROCEDURE — 700105 HCHG RX REV CODE 258: Performed by: PEDIATRICS

## 2022-11-15 PROCEDURE — 81001 URINALYSIS AUTO W/SCOPE: CPT

## 2022-11-15 PROCEDURE — 700102 HCHG RX REV CODE 250 W/ 637 OVERRIDE(OP): Performed by: PEDIATRICS

## 2022-11-15 PROCEDURE — 81003 URINALYSIS AUTO W/O SCOPE: CPT | Mod: 91

## 2022-11-15 PROCEDURE — 99232 SBSQ HOSP IP/OBS MODERATE 35: CPT | Performed by: PEDIATRICS

## 2022-11-15 PROCEDURE — 770004 HCHG ROOM/CARE - ONCOLOGY PRIVATE *

## 2022-11-15 RX ADMIN — SODIUM BICARBONATE: 84 INJECTION, SOLUTION INTRAVENOUS at 05:34

## 2022-11-15 RX ADMIN — IMATINIB MESYLATE 600 MG: 100 TABLET, FILM COATED ORAL at 20:23

## 2022-11-15 RX ADMIN — SODIUM BICARBONATE: 84 INJECTION, SOLUTION INTRAVENOUS at 19:55

## 2022-11-15 RX ADMIN — METHOTREXATE: 25 INJECTION, SOLUTION INTRA-ARTERIAL; INTRAMUSCULAR; INTRATHECAL; INTRAVENOUS at 09:04

## 2022-11-15 RX ADMIN — METHOTREXATE: 25 INJECTION, SOLUTION INTRA-ARTERIAL; INTRAMUSCULAR; INTRATHECAL; INTRAVENOUS at 08:18

## 2022-11-15 RX ADMIN — 0.12% CHLORHEXIDINE GLUCONATE 15 ML: 1.2 RINSE ORAL at 08:23

## 2022-11-15 RX ADMIN — SODIUM BICARBONATE: 84 INJECTION, SOLUTION INTRAVENOUS at 10:52

## 2022-11-15 RX ADMIN — ONDANSETRON 8 MG: 2 INJECTION INTRAMUSCULAR; INTRAVENOUS at 07:43

## 2022-11-15 RX ADMIN — ONDANSETRON 8 MG: 2 INJECTION INTRAMUSCULAR; INTRAVENOUS at 15:51

## 2022-11-15 RX ADMIN — SODIUM BICARBONATE: 84 INJECTION, SOLUTION INTRAVENOUS at 01:04

## 2022-11-15 RX ADMIN — 0.12% CHLORHEXIDINE GLUCONATE 15 ML: 1.2 RINSE ORAL at 20:18

## 2022-11-15 RX ADMIN — VINCRISTINE SULFATE 2 MG: 1 INJECTION, SOLUTION INTRAVENOUS at 07:59

## 2022-11-15 RX ADMIN — MERCAPTOPURINE 50 MG: 50 TABLET ORAL at 20:18

## 2022-11-15 RX ADMIN — SODIUM BICARBONATE: 84 INJECTION, SOLUTION INTRAVENOUS at 15:19

## 2022-11-15 ASSESSMENT — PAIN DESCRIPTION - PAIN TYPE
TYPE: ACUTE PAIN

## 2022-11-15 ASSESSMENT — FIBROSIS 4 INDEX: FIB4 SCORE: 0.58

## 2022-11-15 NOTE — ADDENDUM NOTE
Encounter addended by: Cherrie Urbano R.N. on: 11/15/2022 8:24 AM   Actions taken: Charge Capture section accepted

## 2022-11-15 NOTE — CARE PLAN
The patient is Stable - Low risk of patient condition declining or worsening    Shift Goals  Clinical Goals: Hydration for chemo  Patient Goals: Rest  Family Goals: na    Progress made toward(s) clinical / shift goals:    Problem: Knowledge Deficit - Standard  Goal: Patient and family/care givers will demonstrate understanding of plan of care, disease process/condition, diagnostic tests and medications  Outcome: Progressing  Note: Patient updated on plan of care, all questions answered at this time.     Problem: Fluid Volume  Goal: Fluid volume balance will be maintained  Outcome: Progressing     Problem: Urinary Elimination  Goal: Establish and maintain regular urinary output  Outcome: Progressing       Patient is not progressing towards the following goals:

## 2022-11-15 NOTE — PROGRESS NOTES
"Pediatric Hematology/Oncology  Daily Progress Note      Patient Name:  Tomas Jean-Baptiste  : 2000  MRN: [id number]    Location of Service:  [facility]  Date of Service: 11/15/2022  Time: 3:16 PM    Hospital Day: 2    Protocol / Treatment Plan: [place] Chromosome Precursor B-Cell Acute Lymphoblastic Leukemia, ON STUDY [id number], Interim Maintenance, Day 43 with HD-MTX    SUBJECTIVE:     JULY was admitted yesterday for start of Day 43 HD Mtx. However, his serum creatinine was noted to be 0.77 mg/dL , slightly above baseline of 0.5-0.6 mg/dL. Hence, decision was made to hydrate patient overnight and start chemotherapy today. JULY did well overnight. No complaints this morning. He took 200 mg of Imatinib yesterday evening to complete 600 mg dosing for the day. He also took 50 mg of mercaptopurine yesterday evening. Has dry skin/lips, requesting aquaphor.    Review of Systems:     Constitutional: Afebrile.  HENT: Negative for auditory changes or ear pain, no nosebleeds, mild congestion and rhinorrhea, no sore throat.  No mouth sores.  Eyes: Negative for visual changes.  Respiratory: Negative for shortness of breath or noisy breathing.   Cardiovascular: Negative for chest pain and leg swelling.    Gastrointestinal: Negative for nausea, vomiting, abdominal pain, diarrhea, constipation and blood in stool.    Genitourinary: Negative for dysuria..  Musculoskeletal: Negative for arm pain or leg pain.    Skin: Negative for rash or skin infection.  Neurological: Negative for numbness, tingling, sensory changes, weakness or headaches.    Endo/Heme/Allergies: No bruising/bleeding easily.    Psychiatric/Behavioral: Depressed mood.     OBJECTIVE:     Max Temp: Temp (24hrs), Av.5 °C (97.7 °F), Min:36.2 °C (97.2 °F), Max:36.8 °C (98.3 °F)      Vitals: /74   Pulse 84   Temp 36.5 °C (97.7 °F) (Temporal)   Resp 16   Ht 1.651 m (5' 5\")   Wt 76.8 kg (169 lb 5 oz)   SpO2 98%   " BMI 28.18 kg/m²     I/O:   Intake/Output Summary (Last 24 hours) at 11/15/2022 1516  Last data filed at 11/15/2022 1052  Gross per 24 hour   Intake 3775.83 ml   Output 2150 ml   Net 1625.83 ml       Labs:    Most Recent Hematology Labs:    Lab Results   Component Value Date/Time    WBC 2.0 (L) 11/14/2022 0834    HEMOGLOBIN 11.0 (L) 11/14/2022 0834    .8 (H) 11/14/2022 0834    PLATELETCT 106 (L) 11/14/2022 0834    NEUTS 1.07 (L) 11/14/2022 0834       Most Recent Metabolic Panel:    Lab Results   Component Value Date/Time    POTASSIUM 3.4 (L) 11/15/2022 0530    CHLORIDE 105 11/15/2022 0530    CO2 23 11/15/2022 0530    GLUCOSE 260 (H) 11/15/2022 0530    BUN 7 (L) 11/15/2022 0530    CREATININE 0.70 11/15/2022 0530    CALCIUM 7.6 (L) 11/15/2022 0530    ANION 9.0 11/15/2022 0530     Physical Exam:    Constitutional: Well appearing.  HENT: Normocephalic and atraumatic. No rhinorrhea. Oropharynx is clear and moist. Dry lips. Wearing glasses.  Eyes: Conjunctivae are normal. EOMI.   Neck: Normal range of motion of neck, no adenopathy.    Cardiovascular: Normal rate, regular rhythm.  No murmur. DP/radial pulses 2+, cap refill < 2 sec  Pulmonary/Chest: Effort normall. No respiratory distress. Symmetric expansion.  No crackles or wheezes.  Abdomen: Soft. Bowel sounds are normal. No distension and no mass. There is no hepatosplenomegaly.    Genitourinary:  Deferred  Musculoskeletal: Normal range of motion of lower and upper extremities bilaterally. No tenderness to palpation of elbows, writs, hands, knees, ankles and feet bilaterally.   Neurological: Alert and oriented to person and place. Exhibits normal muscle tone bilaterally in upper and lower extremities. Gait normal. Coordination normal.    Skin: Skin is warm, dry and pink.  No rash or evidence of skin infection. R upper arm has some dried blood (from scratching)   Psychiatric: Mood appropriate for age.    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    After overnight hydration, serum creatinine improved to 0.7 mg/dL. Will proceed with chemotherapy today.      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  - Testicular status NEGATIVE       - Flow cytometry of both 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, FISH positive for BCR-ABL1 translocation   - Enrolled on [facility] [id number]  - Initially enrolled on [facility] [id number] - but 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           - Enrolled on [facility] [id number] and began study 6/13/2022  - Started imatinib therapy 6/3/2022 (split dosing of imatinib 400 mg PO QAM and 200 mg PO QPM) - continued at Day 15 of Induction 1A with imatinib 600 mg PO daily - remains 100% compliant with imatinib  - End of Induction IA and IB MRD negative             On Admission:  - WBC 2000/microliters, Hgb 11.0 gm/dL, platelets 106,000/microliters  - ANC 1070/microliters, /microliters  - Serum Creatinine 0.77 mg/dL (baseline 0.5-0.6)  - AST 25, ALT 74, bilirubin total 0.3      ** After overnight hydration, serum creatinine improved to 0.70 mg/dL, will proceed with chemotherapy today. Serum creatinine of 0.75 mg/dL would be >125% from baseline       SHORTY SALAS Arm B, Interim Maintenance, Day 44:   ** Vincristine 2 mg IV x 1 dose (Completed today)  ** Methotrexate 935 IV over 30 minutes (Completed today)  ** Methotrexate 8415 mg IV Over 24.5 hours starting on Day 43[44] (Infusing)  ** Leucovorin 28  mg IV Q6H starting at hour 42 from start of methotrexate  **Continue mercaptopurine 50 mg = 1 tab PO  x 6 days of the week, 25 mg = 0.5 tabs x 1 day/week)  ** Took Imatinib 400 mg PO on 11/14 AM - s/p 200 mg dose on 11/14 PM while in hospital (no splitting tabs while in hospital)  ** Starting today 11/15/2022 will administer Imatinib 600 mg PO QAM until discharged    - IV fluids per protocol, prehydration x 2 hours and until urine specific gravity < 1.010 and pH > 7  - D5 1/4 NS + 30 mEq sodium bicarbonate at 250 ml/hr (=125 ml/m2/hr)  - Will monitor MTX levels at 24hr, (36 hr if indicated) 42hr, 48hr then daily until MTX <0.1 uM                2) Chemotherapy Related Pancytopenia:  - WBC 2000/microliters, Hgb 11.0 gm/dL, platelets 106,000/microliters  - ANC 1070/microliters, /microliters  - Transfuse to maintain hemoglobin >7 gm/dL or with symptoms  - Transfuse to maintain platelets >10,000/microliters or symptoms  - No indication for transfusion    3) At Risk of Opportunistic Lung Infection:  - Holding Bactrim this past weekend, can resume 72 hours after clearance of methotrexate     4) Sixth Cranial Nerve Palsy (IMPROVED/RESOLVED):   - Followed by Dr. Carranza  - Improvement/Resolution of palsy.      5) Anxiety (IMPROVED GREATLY):     6) Social:             - Continue with support             - Continue school as tolerated     7) Access:               - R Port-A-Cath in place      8) Research Participant:           [facility] - Source Data         Diagnosis: Ph+ Precursor B-Cell Acute Lymphoblastic Leukemia     Disease Status: EOI1A MRD NEGATIVE, EOI1B RD NEGATIVE, CNS3c, testicular negative, HSV1 IgG POSITIVE, CMV IgG NEGATIVE, VARICELLA IgG POSITIVE     Active Studies: [id number], [id number]                                                                                                      Inactive Studies: [id number]                                                                                                                                                 Optional Studies: None             Protocol: International Phase 3 trial in [place] 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      Treatment Plan: [id number](OS), AR-Arm B, Interim Maintenance, Day 44     Height: 1.680 m      Weight: 74.8 kg       BSA: 1.87 m²   (Start of Interim Maintenance 9/29/2022)                                                                                                                                           Performance Status: Karnofsky 100, ECOG  0 (updated 11/14/2022)     Treatment Plan Medications:  (verified 100% compliance)     Currently taking imatinib 400 mg PO QAM and 250 mg QPM  Currently taking mercaptopurine 1 tablet PO QHS Mon-Sat and 0.5 tablet PO QHS Sun     Evaluations / Study Labs:  (11/15/2022):   None     Therapy Given: (11/15/2022)     Vincristine 2 mg IV (COMPLETED TODAY)  Methotrexate 9350 mg IV (5 g/m²)  (Infusing)  Mercaptopurine 1 tablet PO daily x 6 days and 0.5 tablets x 1 day (CONTINUED)  Imatinib 600 mg PO daily (CONTINUED)         Disposition: Admit to inpatient for High-dose Methotrexate, discharge pending clearance of methotrexate    Alyce Duenas MD  Pediatric Hematology / Oncology  [facility]  Cell.  [phone]  [facility]. [phone]

## 2022-11-15 NOTE — CARE PLAN
Problem: Knowledge Deficit - Standard  Goal: Patient and family/care givers will demonstrate understanding of plan of care, disease process/condition, diagnostic tests and medications  Outcome: Progressing  Note: Pt updated on plan of care for the shift. All questions answered at this time. Call light within reach at bedside.      Problem: Fluid Volume  Goal: Fluid volume balance will be maintained  Outcome: Progressing  Note: NS Bolus given per parameters listed on MAR.  Prehydration fluids running as ordered in anticipation of starting chemo tomorrow morning.    The patient is Stable - Low risk of patient condition declining or worsening    Shift Goals  Clinical Goals: chemotherapy  Patient Goals: rest  Family Goals: na    Progress made toward(s) clinical / shift goals:  progressing    Patient is not progressing towards the following goals:

## 2022-11-15 NOTE — PROGRESS NOTES
Med Rec Complete per patient  Allergies Reviewed with patient  Patient's Preferred Pharmacy: [facility] on [address]    Patient takes Bactrim on Saturday & Sunday, but took last doses on Monday & Tuesday of this last week instead.     Patient reported that he cannot recall when he started taking this antibiotic therapy.

## 2022-11-15 NOTE — PROGRESS NOTES
Pt demonstrates ability to turn self in bed without assistance of staff. Patient and family understands importance in prevention of skin breakdown, ulcers, and potential infection. Hourly rounding in effect. RN skin check complete.   Devices in place include: port.  Skin assessed under devices: Yes.  Confirmed HAPI identified on the following date: NA   Location of HAPI: NA.  Wound Care RN following: No.  The following interventions are in place: skin checked with each assessment, pt repositions self in bed, ambulates in room.

## 2022-11-15 NOTE — PROGRESS NOTES
4 Eyes Skin Assessment Completed by Quyen RN and Marquita RN.    Head WDL  Ears WDL  Nose WDL  Mouth WDL  Neck WDL  Breast/Chest WDL  Shoulder Blades WDL  Spine WDL  (R) Arm/Elbow/Hand Scab  (L) Arm/Elbow/Hand WDL  Abdomen WDL  Groin WDL  Scrotum/Coccyx/Buttocks WDL  (R) Leg WDL  (L) Leg WDL  (R) Heel/Foot/Toe WDL  (L) Heel/Foot/Toe WDL          Devices In Places chest port      Interventions In Place Pressure Redistribution Mattress    Possible Skin Injury No    Pictures Uploaded Into Epic N/A  Wound Consult Placed N/A  RN Wound Prevention Protocol Ordered No

## 2022-11-15 NOTE — CARE PLAN
Problem: Urinary Elimination  Goal: Establish and maintain regular urinary output  Outcome: Progressing  Note: Pt voiding adequately throughout shift. Urine collected per protocol while methotrexate infusing.    The patient is Stable - Low risk of patient condition declining or worsening    Shift Goals  Clinical Goals: tolerate chemo  Patient Goals: rest  Family Goals: na    Progress made toward(s) clinical / shift goals:  progressing    Patient is not progressing towards the following goals:

## 2022-11-16 LAB
ANION GAP SERPL CALC-SCNC: 9 MMOL/L (ref 7–16)
APPEARANCE UR: CLEAR
BACTERIA #/AREA URNS HPF: NEGATIVE /HPF
BACTERIA #/AREA URNS HPF: NEGATIVE /HPF
BILIRUB UR QL STRIP.AUTO: NEGATIVE
BUN SERPL-MCNC: 3 MG/DL (ref 8–22)
CALCIUM SERPL-MCNC: 8.3 MG/DL (ref 8.5–10.5)
CHLORIDE SERPL-SCNC: 105 MMOL/L (ref 96–112)
CO2 SERPL-SCNC: 25 MMOL/L (ref 20–33)
COLOR UR: ABNORMAL
COLOR UR: ABNORMAL
COLOR UR: YELLOW
CREAT SERPL-MCNC: 0.64 MG/DL (ref 0.5–1.4)
CREAT SERPL-MCNC: 0.67 MG/DL (ref 0.5–1.4)
EPI CELLS #/AREA URNS HPF: NEGATIVE /HPF
EPI CELLS #/AREA URNS HPF: NEGATIVE /HPF
GFR SERPLBLD CREATININE-BSD FMLA CKD-EPI: 136 ML/MIN/1.73 M 2
GFR SERPLBLD CREATININE-BSD FMLA CKD-EPI: 138 ML/MIN/1.73 M 2
GLUCOSE SERPL-MCNC: 118 MG/DL (ref 65–99)
GLUCOSE UR STRIP.AUTO-MCNC: 100 MG/DL
GLUCOSE UR STRIP.AUTO-MCNC: NEGATIVE MG/DL
HYALINE CASTS #/AREA URNS LPF: ABNORMAL /LPF
HYALINE CASTS #/AREA URNS LPF: ABNORMAL /LPF
KETONES UR STRIP.AUTO-MCNC: NEGATIVE MG/DL
LEUKOCYTE ESTERASE UR QL STRIP.AUTO: NEGATIVE
MICRO URNS: ABNORMAL
MICRO URNS: NORMAL
MICRO URNS: NORMAL
MTX SERPL-SCNC: 23 UMOL/L
MTX SERPL-SCNC: 68 UMOL/L
NITRITE UR QL STRIP.AUTO: NEGATIVE
PH UR STRIP.AUTO: 7 [PH] (ref 5–8)
PH UR STRIP.AUTO: 7.5 [PH] (ref 5–8)
PH UR STRIP.AUTO: 8 [PH] (ref 5–8)
POTASSIUM SERPL-SCNC: 3.4 MMOL/L (ref 3.6–5.5)
PROT UR QL STRIP: 30 MG/DL
PROT UR QL STRIP: 30 MG/DL
PROT UR QL STRIP: NEGATIVE MG/DL
RBC # URNS HPF: ABNORMAL /HPF
RBC # URNS HPF: ABNORMAL /HPF
RBC UR QL AUTO: NEGATIVE
SODIUM SERPL-SCNC: 139 MMOL/L (ref 135–145)
SP GR UR STRIP.AUTO: 1
SP GR UR STRIP.AUTO: 1.01
SP GR UR STRIP.AUTO: 1.01
UROBILINOGEN UR STRIP.AUTO-MCNC: 0.2 MG/DL
WBC #/AREA URNS HPF: ABNORMAL /HPF
WBC #/AREA URNS HPF: ABNORMAL /HPF

## 2022-11-16 PROCEDURE — 770004 HCHG ROOM/CARE - ONCOLOGY PRIVATE *

## 2022-11-16 PROCEDURE — 81003 URINALYSIS AUTO W/O SCOPE: CPT | Mod: 91

## 2022-11-16 PROCEDURE — 80048 BASIC METABOLIC PNL TOTAL CA: CPT

## 2022-11-16 PROCEDURE — 81001 URINALYSIS AUTO W/SCOPE: CPT

## 2022-11-16 PROCEDURE — 700102 HCHG RX REV CODE 250 W/ 637 OVERRIDE(OP): Performed by: PEDIATRICS

## 2022-11-16 PROCEDURE — A9270 NON-COVERED ITEM OR SERVICE: HCPCS | Performed by: PEDIATRICS

## 2022-11-16 PROCEDURE — 80299 QUANTITATIVE ASSAY DRUG: CPT

## 2022-11-16 PROCEDURE — 99232 SBSQ HOSP IP/OBS MODERATE 35: CPT | Performed by: PEDIATRICS

## 2022-11-16 PROCEDURE — 82565 ASSAY OF CREATININE: CPT

## 2022-11-16 PROCEDURE — 700111 HCHG RX REV CODE 636 W/ 250 OVERRIDE (IP): Performed by: PEDIATRICS

## 2022-11-16 PROCEDURE — 700105 HCHG RX REV CODE 258: Performed by: PEDIATRICS

## 2022-11-16 RX ADMIN — SODIUM BICARBONATE: 84 INJECTION, SOLUTION INTRAVENOUS at 00:17

## 2022-11-16 RX ADMIN — 0.12% CHLORHEXIDINE GLUCONATE 15 ML: 1.2 RINSE ORAL at 10:11

## 2022-11-16 RX ADMIN — ONDANSETRON 8 MG: 2 INJECTION INTRAMUSCULAR; INTRAVENOUS at 00:27

## 2022-11-16 RX ADMIN — SODIUM BICARBONATE: 84 INJECTION, SOLUTION INTRAVENOUS at 23:01

## 2022-11-16 RX ADMIN — SODIUM BICARBONATE: 84 INJECTION, SOLUTION INTRAVENOUS at 18:30

## 2022-11-16 RX ADMIN — MERCAPTOPURINE 50 MG: 50 TABLET ORAL at 20:27

## 2022-11-16 RX ADMIN — ONDANSETRON 8 MG: 2 INJECTION INTRAMUSCULAR; INTRAVENOUS at 16:09

## 2022-11-16 RX ADMIN — SODIUM BICARBONATE: 84 INJECTION, SOLUTION INTRAVENOUS at 14:16

## 2022-11-16 RX ADMIN — 0.12% CHLORHEXIDINE GLUCONATE 15 ML: 1.2 RINSE ORAL at 21:20

## 2022-11-16 RX ADMIN — ONDANSETRON 8 MG: 2 INJECTION INTRAMUSCULAR; INTRAVENOUS at 08:35

## 2022-11-16 RX ADMIN — IMATINIB MESYLATE 600 MG: 100 TABLET, FILM COATED ORAL at 20:00

## 2022-11-16 RX ADMIN — SODIUM BICARBONATE: 84 INJECTION, SOLUTION INTRAVENOUS at 09:48

## 2022-11-16 ASSESSMENT — COGNITIVE AND FUNCTIONAL STATUS - GENERAL
SUGGESTED CMS G CODE MODIFIER DAILY ACTIVITY: CH
DAILY ACTIVITIY SCORE: 24
SUGGESTED CMS G CODE MODIFIER MOBILITY: CH
MOBILITY SCORE: 24

## 2022-11-16 ASSESSMENT — PAIN DESCRIPTION - PAIN TYPE: TYPE: ACUTE PAIN

## 2022-11-16 ASSESSMENT — PATIENT HEALTH QUESTIONNAIRE - PHQ9
1. LITTLE INTEREST OR PLEASURE IN DOING THINGS: NOT AT ALL
SUM OF ALL RESPONSES TO PHQ9 QUESTIONS 1 AND 2: 0
2. FEELING DOWN, DEPRESSED, IRRITABLE, OR HOPELESS: NOT AT ALL

## 2022-11-16 NOTE — CARE PLAN
The patient is Stable - Low risk of patient condition declining or worsening    Shift Goals  Clinical Goals: tolerate chemo, monitor urine pH,  Patient Goals: rest  Family Goals: na    Progress made toward(s) clinical / shift goals:      Problem: Knowledge Deficit - Standard  Goal: Patient and family/care givers will demonstrate understanding of plan of care, disease process/condition, diagnostic tests and medications  Outcome: Progressing  Note: A/Ox4, pt is able to understand plan of care. Chemo running, pt tolerating well at this time. Q4-6 urine checks in place. All questions answered at the moment.       Problem: Urinary Elimination  Goal: Establish and maintain regular urinary output  Outcome: Progressing     Problem: Bowel Elimination  Goal: Establish and maintain regular bowel function  Outcome: Progressing     Problem: Skin Integrity  Goal: Skin integrity is maintained or improved  Outcome: Progressing     Problem: Fall Risk  Goal: Patient will remain free from falls  Outcome: Progressing  Note: Fall precautions in place, bed in lowest position, call light and belongings in reach.   Pt calls appropriately.         Patient is not progressing towards the following goals:

## 2022-11-16 NOTE — PROGRESS NOTES
4 Eyes Skin Assessment Completed by Caren MORGAN RN and MONTSERRAT Gonzalez.    Head WDL  Ears WDL  Nose WDL  Mouth WDL  Neck WDL  Breast/Chest WDL  Shoulder Blades WDL  Spine WDL  (R) Arm/Elbow/Hand Scabs to RUE from old PICC site.   (L) Arm/Elbow/Hand WDL  Abdomen WDL  Groin WDL  Scrotum/Coccyx/Buttocks WDL  (R) Leg WDL  (L) Leg WDL  (R) Heel/Foot/Toe WDL  (L) Heel/Foot/Toe WDL        Devices In Places Chest port.       Interventions In Place Pillows    Possible Skin Injury No    Pictures Uploaded Into Epic N/A  Wound Consult Placed N/A  RN Wound Prevention Protocol Ordered No

## 2022-11-16 NOTE — CARE PLAN
The patient is Watcher - Medium risk of patient condition declining or worsening    Shift Goals  Clinical Goals: tolerate chemo, monitor urine pH,  Patient Goals: rest  Family Goals: na    Progress made toward(s) clinical / shift goals: appears calm and comfortable, denies pain, edication done on POC, all questions and concerns were addressed.     Patient is not progressing towards the following goals:      Problem: Knowledge Deficit - Standard  Goal: Patient and family/care givers will demonstrate understanding of plan of care, disease process/condition, diagnostic tests and medications  Outcome: Progressing     Problem: Psychosocial  Goal: Patient will experience minimized separation anxiety and fear  Outcome: Progressing     Problem: Fluid Volume  Goal: Fluid volume balance will be maintained  Outcome: Progressing     Problem: Self Care  Goal: Patient will have the ability to perform ADLs independently or with assistance (bathe, groom, dress, toilet and feed)  Outcome: Progressing

## 2022-11-16 NOTE — PROGRESS NOTES
Afebrile.  VSS.  Awake and alert in no acute distress.      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  Calculations within 10% of written order.  Lab results reviewed.      Premedications and chemo given as ordered, see MAR.  Blood return verified prior to, during, and after chemotherapy infusion.  See Chemotherapy flowsheet.  PT tolerated well.  No side effects or complications noted.

## 2022-11-16 NOTE — PROGRESS NOTES
Pediatric Hematology/Oncology  Daily Progress Note      Patient Name:  Tomas Jean-Baptiste  : 2000  MRN: [id number]    Location of Service:  [facility] - Cancer Nursing [facility]  Date of Service: 2022  Time: 1:18 PM    Hospital Day: 3    Protocol / Treatment Plan: [place] Chromosome Precursor B-Cell Acute Lymphoblastic Leukemia, ON STUDY [id number], Interim Maintenance, Day 43 (44) with HD-MTX    SUBJECTIVE:     Transferred to CNU yesterday due to issues with bed availability on the Pediatric side for new admissions.    Completed 24 hr Mtx infusion today morning. Tolerated Mtx infusion well except nausea/vomiting. This morning, had small emesis x 3. JULY does not want any PRN antiemetics. If emesis gets worse, he will request PRN medications. Taking his oral chemotherapy tablets Imatinib and 6-mercaptopurine as prescribed. Has not thrown up PO medications. No reports of abdominal pain/distension. Appetite remains below baseline. Stooling/voiding well. No reports of mouth sores/pain. Denies headache, tremors, abnormal movements or other neurologic changes. No rash, easy bruising.     Review of Systems:     Constitutional: Afebrile. Appetite below baseline  HENT: Negative for auditory changes or ear pain, no nosebleeds, no congestion and rhinorrhea, no sore throat.  No mouth sores.  Eyes: Negative for visual changes.  Respiratory: Negative for shortness of breath or noisy breathing.   Cardiovascular: Negative for chest pain and leg swelling.    Gastrointestinal: Negative for abdominal pain, diarrhea, constipation and blood in stool.  Positive for nausea, vomiting.  Genitourinary: Negative for dysuria.  Musculoskeletal: Negative for arm pain or leg pain.    Skin: Negative for rash or skin infection.  Neurological: Negative for numbness, tingling, sensory changes, weakness or headaches.    Endo/Heme/Allergies: No bruising/bleeding easily.    Psychiatric/Behavioral: Mood appropriate for  "age.    OBJECTIVE:     Max Temp: Temp (24hrs), Av.5 °C (97.7 °F), Min:36.2 °C (97.2 °F), Max:36.8 °C (98.3 °F)      Vitals: /61   Pulse 72   Temp 36.8 °C (98.3 °F) (Oral)   Resp 14   Ht 1.651 m (5' 5\")   Wt 76.8 kg (169 lb 5 oz)   SpO2 96%   BMI 28.18 kg/m²     I/O:   Intake/Output Summary (Last 24 hours) at 2022 1318  Last data filed at 11/15/2022 1700  Gross per 24 hour   Intake 266.04 ml   Output 500 ml   Net -233.96 ml         Labs:    Most Recent Hematology Labs:    Lab Results   Component Value Date/Time    WBC 2.0 (L) 2022 0834    HEMOGLOBIN 11.0 (L) 2022 0834    .8 (H) 2022 0834    PLATELETCT 106 (L) 2022 0834    NEUTS 1.07 (L) 2022 0834       Most Recent Metabolic Panel:   Latest Reference Range & Units 22 08:20   Sodium 135 - 145 mmol/L 139   Potassium 3.6 - 5.5 mmol/L 3.4 (L)   Chloride 96 - 112 mmol/L 105   Co2 20 - 33 mmol/L 25   Anion Gap 7.0 - 16.0  9.0   Glucose 65 - 99 mg/dL 118 (H)   Bun 8 - 22 mg/dL 3 (L)   Creatinine 0.50 - 1.40 mg/dL  0.50 - 1.40 mg/dL 0.67  0.64   GFR (CKD-EPI) >60 mL/min/1.73 m 2  >60 mL/min/1.73 m 2 136  138   Calcium 8.5 - 10.5 mg/dL 8.3 (L)     Physical Exam:    Constitutional: Well appearing.  HENT: Normocephalic and atraumatic. No rhinorrhea. Oropharynx is clear and moist. Dry lips. Wearing glasses.  Eyes: Conjunctivae are normal. EOMI.   Neck: Normal range of motion of neck, no adenopathy.    Cardiovascular: Normal rate, regular rhythm.  No murmur. DP/radial pulses 2+, cap refill < 2 sec  Pulmonary/Chest: Effort normall. No respiratory distress. Symmetric expansion.  No crackles or wheezes.  Abdomen: Soft. Bowel sounds are normal. No distension and no mass. There is no hepatosplenomegaly.    Genitourinary:  Deferred  Musculoskeletal: Normal range of motion of lower and upper extremities bilaterally. No tenderness to palpation of elbows, writs, hands, knees, ankles and feet bilaterally.   Neurological: " Alert and oriented to person and place. Exhibits normal muscle tone bilaterally in upper and lower extremities. Gait normal. Coordination normal.    Skin: Skin is warm, dry and pink.  No rash or evidence of skin infection. R upper arm has some dried blood (from scratching)   Psychiatric: Mood appropriate for age.    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    Mtx infusion started at 0818 on 11/15/2022 and completed at 0800 today 11/16/2022. 24 hr Mtx level drawn at 0820 was noted to be 11.70 umol/L (with 1:10 dilution). However, if corrected for 1:100 dilution, the Mtx level at 24 hr would be 68 umol/L. Repeated the Mtx level at hr 26 and 25 minutes which was 9.70 umol/L. Serum creatinine trended down to baseline value of 0.64 mg/dL.    Serum glucose much improved compared to yesterday (likely from dextrose in fluids/hyperhydration). UA showing pH between 7-8. No glucosuria, but proteinuria present.      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  - Testicular status NEGATIVE       - Flow cytometry of both 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, FISH positive for BCR-ABL1 translocation   - [place] [facility] [id number]  - [place] [facility] [id number] - but 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           - [place] [facility] [id number] and began study 6/13/2022  - Started imatinib therapy 6/3/2022 (split dosing of imatinib 400 mg PO QAM and 200 mg  PO QPM) - continued at Day 15 of Induction 1A with imatinib 600 mg PO daily - remains 100% compliant with imatinib  - End of Induction IA and IB MRD negative             On Admission:  - WBC 2000/microliters, Hgb 11.0 gm/dL, platelets 106,000/microliters  - ANC 1070/microliters, /microliters  - Serum Creatinine 0.77 mg/dL (baseline 0.5-0.6)  - AST 25, ALT 74, bilirubin total 0.3      ** After overnight hydration, serum creatinine improved to 0.70 mg/dL on 11/15/2022, decision made to proceed with chemotherapy on 11/15/2022. Mtx infusion started at 0818 on 11/15/2022 and completed at 0800 on 11/16/2022. Serum creatinine of 0.75 mg/dL would be >125% from baseline .      [id number], AR Arm B, Interim Maintenance, Day 45:   ** Vincristine 2 mg IV x 1 dose (Completed)  ** Methotrexate 935 IV over 30 minutes (Completed)  ** Methotrexate 8415 mg IV Over 24.5 hours starting on Day 43[44] (Completed)  ** Leucovorin 28 mg IV Q6H starting at hour 42 from start of methotrexate  **Continue mercaptopurine 50 mg = 1 tab PO  x 6 days of the week, 25 mg = 0.5 tabs x 1 day/week)  ** Took Imatinib 400 mg PO on 11/14 AM - s/p 200 mg dose on 11/14 PM while in hospital (no splitting tabs while in hospital)  ** Starting 11/15/2022 will administer Imatinib 600 mg PO QAM until discharged    - IV fluids per protocol, prehydration x 2 hours and until urine specific gravity < 1.010 and pH > 7  - D5 1/4 NS + 30 mEq sodium bicarbonate at 250 ml/hr (=125 ml/m2/hr)  - Will monitor MTX levels at 24hr, (36 hr if indicated) 42hr, 48hr then daily until MTX <0.1 uM    **24 hr Mtx level drawn at 0820 was noted to be 11.70 umol/L (with 1:10 dilution). However, if corrected for 1:100 dilution, the Mtx level at 24 hr would be 68 umol/L.     **Repeated the Mtx level at hr 26 and 25 minutes which was 9.70 umol/L.   ** Due for 42 hr level at 0220 am on 11/17/2022. Start Leucovorin at hr 42, scheduled to start at 0200 on 11/17/2022.                2)  Chemotherapy Related Pancytopenia:  - WBC 2000/microliters, Hgb 11.0 gm/dL, platelets 106,000/microliters  - ANC 1070/microliters, /microliters  - Transfuse to maintain hemoglobin >7 gm/dL or with symptoms  - Transfuse to maintain platelets >10,000/microliters or symptoms  - No indication for transfusion    3) At Risk of Opportunistic Infection:  - Holding Bactrim BID SS for PJP prophylaxis this past weekend, can resume 72 hours after clearance of methotrexate  - Chlorhexidine mouth wash 4 times daily    4) Nausea and Vomiting Secondary To Chemotherapy:  - Receiving scheduled Zofran 8 mg every 8 hrs  - Ativan and Phenergan available for breakthrough: Patient declining PRN medications currently     5) Sixth Cranial Nerve Palsy (IMPROVED/RESOLVED):   - Followed by Dr. Carranza  - Improvement/Resolution of palsy.      6) Anxiety (IMPROVED GREATLY):     7) Social:   - Continue with support  - Continue school as tolerated     8) Access:   - R Port-A-Cath in place  - EMLA cream prior to access      9) Research Participant:           [employer] [facility] [facility] - Source Data         Diagnosis: Ph+ Precursor B-Cell Acute Lymphoblastic Leukemia     Disease Status: EOI1A MRD NEGATIVE, EOI1B RD NEGATIVE, CNS3c, testicular negative, HSV1 IgG POSITIVE, CMV IgG NEGATIVE, VARICELLA IgG POSITIVE     Active Studies: [id number], [id number]                                                                                                      Inactive Studies: [id number]                                                                                                                                                Optional Studies: None             Protocol: International Phase 3 trial in [place] 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      Treatment Plan: [id number](OS), AR-Arm B, Interim Maintenance, Day 45     Height: 1.680 m       Weight: 74.8 kg       BSA: 1.87 m²   (Start of Interim Maintenance 9/29/2022)                                                                                                                                           Performance Status: Karnofsky 100, ECOG  0 (updated 11/14/2022)     Treatment Plan Medications:  (verified 100% compliance)     Currently taking imatinib 400 mg PO QAM and 250 mg QPM  Currently taking mercaptopurine 1 tablet PO QHS Mon-Sat and 0.5 tablet PO QHS Sun     Evaluations / Study Labs:  (11/16/2022):   None     Therapy Given: (11/16/2022): No IV chemotherapy. Only PO chemotherapy as above.     Vincristine 2 mg IV (COMPLETED 11/15/2022)  Methotrexate 9350 mg IV (5 g/m²)  (Completed on 11/16/2022)  Mercaptopurine 1 tablet PO daily x 6 days and 0.5 tablets x 1 day (CONTINUED)  Imatinib 600 mg PO daily (CONTINUED)         Disposition: Admit to inpatient for High-dose Methotrexate, discharge pending clearance of methotrexate    Alyce Duenas MD  Pediatric Hematology / Oncology  [facility]  Cell.  [phone]  [facility] [phone]

## 2022-11-17 LAB
ANION GAP SERPL CALC-SCNC: 10 MMOL/L (ref 7–16)
APPEARANCE UR: CLEAR
BILIRUB UR QL STRIP.AUTO: NEGATIVE
BUN SERPL-MCNC: 3 MG/DL (ref 8–22)
CALCIUM SERPL-MCNC: 8.5 MG/DL (ref 8.5–10.5)
CHLORIDE SERPL-SCNC: 104 MMOL/L (ref 96–112)
CO2 SERPL-SCNC: 26 MMOL/L (ref 20–33)
COLOR UR: YELLOW
CREAT SERPL-MCNC: 0.6 MG/DL (ref 0.5–1.4)
CREAT SERPL-MCNC: 0.62 MG/DL (ref 0.5–1.4)
GFR SERPLBLD CREATININE-BSD FMLA CKD-EPI: 139 ML/MIN/1.73 M 2
GFR SERPLBLD CREATININE-BSD FMLA CKD-EPI: 141 ML/MIN/1.73 M 2
GLUCOSE SERPL-MCNC: 117 MG/DL (ref 65–99)
GLUCOSE UR STRIP.AUTO-MCNC: NEGATIVE MG/DL
KETONES UR STRIP.AUTO-MCNC: NEGATIVE MG/DL
LEUKOCYTE ESTERASE UR QL STRIP.AUTO: NEGATIVE
MICRO URNS: ABNORMAL
MICRO URNS: NORMAL
MTX SERPL-SCNC: 0.24 UMOL/L
MTX SERPL-SCNC: 0.28 UMOL/L
MTX SERPL-SCNC: 0.43 UMOL/L
NITRITE UR QL STRIP.AUTO: NEGATIVE
PH UR STRIP.AUTO: 7 [PH] (ref 5–8)
PH UR STRIP.AUTO: 8 [PH] (ref 5–8)
PH UR STRIP.AUTO: 8 [PH] (ref 5–8)
PH UR STRIP.AUTO: 8.5 [PH] (ref 5–8)
POTASSIUM SERPL-SCNC: 3.2 MMOL/L (ref 3.6–5.5)
PROT UR QL STRIP: NEGATIVE MG/DL
RBC UR QL AUTO: NEGATIVE
SODIUM SERPL-SCNC: 140 MMOL/L (ref 135–145)
SP GR UR STRIP.AUTO: 1.01
UROBILINOGEN UR STRIP.AUTO-MCNC: 0.2 MG/DL
UROBILINOGEN UR STRIP.AUTO-MCNC: 1 MG/DL

## 2022-11-17 PROCEDURE — A9270 NON-COVERED ITEM OR SERVICE: HCPCS | Performed by: PEDIATRICS

## 2022-11-17 PROCEDURE — 81003 URINALYSIS AUTO W/O SCOPE: CPT | Mod: 91

## 2022-11-17 PROCEDURE — 770004 HCHG ROOM/CARE - ONCOLOGY PRIVATE *

## 2022-11-17 PROCEDURE — 82565 ASSAY OF CREATININE: CPT

## 2022-11-17 PROCEDURE — 80048 BASIC METABOLIC PNL TOTAL CA: CPT

## 2022-11-17 PROCEDURE — 700105 HCHG RX REV CODE 258: Performed by: PEDIATRICS

## 2022-11-17 PROCEDURE — 700111 HCHG RX REV CODE 636 W/ 250 OVERRIDE (IP): Performed by: PEDIATRICS

## 2022-11-17 PROCEDURE — 99232 SBSQ HOSP IP/OBS MODERATE 35: CPT | Performed by: PEDIATRICS

## 2022-11-17 PROCEDURE — 80299 QUANTITATIVE ASSAY DRUG: CPT | Mod: 91

## 2022-11-17 PROCEDURE — 700102 HCHG RX REV CODE 250 W/ 637 OVERRIDE(OP): Performed by: PEDIATRICS

## 2022-11-17 RX ADMIN — ONDANSETRON 8 MG: 2 INJECTION INTRAMUSCULAR; INTRAVENOUS at 15:56

## 2022-11-17 RX ADMIN — LEUCOVORIN CALCIUM 28.1 MG: 350 INJECTION, POWDER, LYOPHILIZED, FOR SUSPENSION INTRAMUSCULAR; INTRAVENOUS at 02:00

## 2022-11-17 RX ADMIN — SODIUM BICARBONATE: 84 INJECTION, SOLUTION INTRAVENOUS at 13:42

## 2022-11-17 RX ADMIN — LEUCOVORIN CALCIUM 28.1 MG: 350 INJECTION, POWDER, LYOPHILIZED, FOR SUSPENSION INTRAMUSCULAR; INTRAVENOUS at 20:24

## 2022-11-17 RX ADMIN — MERCAPTOPURINE 50 MG: 50 TABLET ORAL at 20:24

## 2022-11-17 RX ADMIN — IMATINIB MESYLATE 600 MG: 100 TABLET, FILM COATED ORAL at 20:00

## 2022-11-17 RX ADMIN — ONDANSETRON 8 MG: 2 INJECTION INTRAMUSCULAR; INTRAVENOUS at 08:00

## 2022-11-17 RX ADMIN — 0.12% CHLORHEXIDINE GLUCONATE 15 ML: 1.2 RINSE ORAL at 08:00

## 2022-11-17 RX ADMIN — LEUCOVORIN CALCIUM 28.1 MG: 350 INJECTION, POWDER, LYOPHILIZED, FOR SUSPENSION INTRAMUSCULAR; INTRAVENOUS at 08:04

## 2022-11-17 RX ADMIN — 0.12% CHLORHEXIDINE GLUCONATE 15 ML: 1.2 RINSE ORAL at 21:06

## 2022-11-17 RX ADMIN — LEUCOVORIN CALCIUM 28.1 MG: 350 INJECTION, POWDER, LYOPHILIZED, FOR SUSPENSION INTRAMUSCULAR; INTRAVENOUS at 13:52

## 2022-11-17 RX ADMIN — SODIUM BICARBONATE: 84 INJECTION, SOLUTION INTRAVENOUS at 18:33

## 2022-11-17 RX ADMIN — SODIUM BICARBONATE: 84 INJECTION, SOLUTION INTRAVENOUS at 04:08

## 2022-11-17 RX ADMIN — SODIUM BICARBONATE: 84 INJECTION, SOLUTION INTRAVENOUS at 09:09

## 2022-11-17 ASSESSMENT — PAIN DESCRIPTION - PAIN TYPE
TYPE: ACUTE PAIN
TYPE: ACUTE PAIN

## 2022-11-17 NOTE — DISCHARGE PLANNING
Care Transition Team Assessment    Information Source  Orientation Level: Oriented X4  Information Given By: Patient  Informant's Name: JULY  Who is responsible for making decisions for patient? : Patient    Readmission Evaluation  Is this a readmission?: Yes - planned readmission    Elopement Risk  Legal Hold: No  Ambulatory or Self Mobile in Wheelchair: Yes  Disoriented: No  Psychiatric Symptoms: None  History of Wandering: No  Elopement this Admit: No  Vocalizing Wanting to Leave: No  Displays Behaviors, Body Language Wanting to Leave: No-Not at Risk for Elopement  Elopement Risk: Not at Risk for Elopement    Interdisciplinary Discharge Planning  Does Admitting Nurse Feel This Could be a Complex Discharge?: No  Primary Care Physician: Margarito Arvizu  Lives with - Patient's Self Care Capacity: Parents  Patient or legal guardian wants to designate a caregiver: No  Support Systems: Parent, Friends / Neighbors  Housing / Facility: Unable To Determine At This Time  Do You Take your Prescribed Medications Regularly: Yes  Able to Return to Previous ADL's: Yes  Mobility Issues: No  Prior Services: None  Patient Prefers to be Discharged to:: home  Assistance Needed: Yes  Durable Medical Equipment: Not Applicable  DME Provider / Phone: na    Discharge Preparedness  What is your plan after discharge?: Home with help  What are your discharge supports?: Parent  Prior Functional Level: Ambulatory, Independent with Activities of Daily Living  Difficulity with ADLs: None  Difficulity with IADLs: Driving (Does not drive)    Functional Assesment  Prior Functional Level: Ambulatory, Independent with Activities of Daily Living    Finances  Financial Barriers to Discharge: No  Prescription Coverage: Yes    Vision / Hearing Impairment  Right Eye Vision: Impaired, Wears Glasses  Left Eye Vision: Impaired, Wears Glasses       Advance Directive  Advance Directive?: None  Advance Directive offered?: AD Booklet refused    Domestic Abuse  Have  "you ever been the victim of abuse or violence?: No    Psychological Assessment  History of Substance Abuse: None  History of Psychiatric Problems: No  Non-compliant with Treatment: Yes  Newly Diagnosed Illness: No    Discharge Risks or Barriers  Discharge risks or barriers?: No  Patient risk factors: Complex medical needs    Anticipated Discharge Information  Discharge Disposition: Discharged to home/self care (01)  Discharge Address: [address] [place] [id number]  Discharge Contact Phone Number: [phone]    Note:  CM met w/ Pt -- confirmed address & contact information.  Pt stated, \"he lives w/ his mother in [place], [place].  Pt stated \"his mother is very supportive & assist him w/ anything he needs\".   Pt stated, \"he was independent w/ ADL's,  IADL's, uses no AD;s  and does not drive voluntarily, but he does have support to provide transport for all his needs\".  Pt's pharmacy is [facility].  Pt denied any concerns.     "

## 2022-11-17 NOTE — CARE PLAN
The patient is Watcher - Medium risk of patient condition declining or worsening    Shift Goals  Clinical Goals: monitor urine pH, leucovorin, monitor labs  Patient Goals: sleep  Family Goals: na    Progress made toward(s) clinical / shift goals:    Problem: Knowledge Deficit - Standard  Goal: Patient and family/care givers will demonstrate understanding of plan of care, disease process/condition, diagnostic tests and medications  Outcome: Progressing   Problem: Nutrition - Standard  Goal: Patient's nutritional and fluid intake will be adequate or improve  Outcome: Progressing   Problem: Self Care  Goal: Patient will have the ability to perform ADLs independently or with assistance (bathe, groom, dress, toilet and feed)  Outcome: Progressing   Problem: Skin Integrity  Goal: Skin integrity is maintained or improved  Outcome: Progressing   Problem: Fall Risk  Goal: Patient will remain free from falls  Outcome: Progressing       Patient is not progressing towards the following goals:

## 2022-11-18 ENCOUNTER — PHARMACY VISIT (OUTPATIENT)
Dept: PHARMACY | Facility: MEDICAL CENTER | Age: 22
End: 2022-11-18
Payer: COMMERCIAL

## 2022-11-18 VITALS
TEMPERATURE: 98.6 F | SYSTOLIC BLOOD PRESSURE: 112 MMHG | HEIGHT: 65 IN | RESPIRATION RATE: 17 BRPM | OXYGEN SATURATION: 97 % | BODY MASS INDEX: 28.21 KG/M2 | HEART RATE: 99 BPM | DIASTOLIC BLOOD PRESSURE: 70 MMHG | WEIGHT: 169.31 LBS

## 2022-11-18 LAB
ANION GAP SERPL CALC-SCNC: 10 MMOL/L (ref 7–16)
APPEARANCE UR: CLEAR
APPEARANCE UR: CLEAR
BASOPHILS # BLD AUTO: 0.8 % (ref 0–1.8)
BASOPHILS # BLD: 0.01 K/UL (ref 0–0.12)
BILIRUB UR QL STRIP.AUTO: NEGATIVE
BILIRUB UR QL STRIP.AUTO: NEGATIVE
BUN SERPL-MCNC: 3 MG/DL (ref 8–22)
CALCIUM SERPL-MCNC: 8.3 MG/DL (ref 8.5–10.5)
CHLORIDE SERPL-SCNC: 105 MMOL/L (ref 96–112)
CO2 SERPL-SCNC: 26 MMOL/L (ref 20–33)
COLOR UR: YELLOW
COLOR UR: YELLOW
CREAT SERPL-MCNC: 0.62 MG/DL (ref 0.5–1.4)
EOSINOPHIL # BLD AUTO: 0.01 K/UL (ref 0–0.51)
EOSINOPHIL NFR BLD: 0.8 % (ref 0–6.9)
ERYTHROCYTE [DISTWIDTH] IN BLOOD BY AUTOMATED COUNT: 53.3 FL (ref 35.9–50)
GFR SERPLBLD CREATININE-BSD FMLA CKD-EPI: 139 ML/MIN/1.73 M 2
GLUCOSE SERPL-MCNC: 110 MG/DL (ref 65–99)
GLUCOSE UR STRIP.AUTO-MCNC: NEGATIVE MG/DL
GLUCOSE UR STRIP.AUTO-MCNC: NEGATIVE MG/DL
HCT VFR BLD AUTO: 30.4 % (ref 42–52)
HGB BLD-MCNC: 10.2 G/DL (ref 14–18)
IMM GRANULOCYTES # BLD AUTO: 0 K/UL (ref 0–0.11)
IMM GRANULOCYTES NFR BLD AUTO: 0 % (ref 0–0.9)
KETONES UR STRIP.AUTO-MCNC: NEGATIVE MG/DL
KETONES UR STRIP.AUTO-MCNC: NEGATIVE MG/DL
LEUKOCYTE ESTERASE UR QL STRIP.AUTO: NEGATIVE
LEUKOCYTE ESTERASE UR QL STRIP.AUTO: NEGATIVE
LYMPHOCYTES # BLD AUTO: 0.28 K/UL (ref 1–4.8)
LYMPHOCYTES NFR BLD: 23.5 % (ref 22–41)
MCH RBC QN AUTO: 33.8 PG (ref 27–33)
MCHC RBC AUTO-ENTMCNC: 33.6 G/DL (ref 33.7–35.3)
MCV RBC AUTO: 100.7 FL (ref 81.4–97.8)
MICRO URNS: NORMAL
MICRO URNS: NORMAL
MONOCYTES # BLD AUTO: 0.1 K/UL (ref 0–0.85)
MONOCYTES NFR BLD AUTO: 8.4 % (ref 0–13.4)
MTX SERPL-SCNC: 0.14 UMOL/L
NEUTROPHILS # BLD AUTO: 0.79 K/UL (ref 1.82–7.42)
NEUTROPHILS NFR BLD: 66.5 % (ref 44–72)
NITRITE UR QL STRIP.AUTO: NEGATIVE
NITRITE UR QL STRIP.AUTO: NEGATIVE
NRBC # BLD AUTO: 0 K/UL
NRBC BLD-RTO: 0 /100 WBC
PH UR STRIP.AUTO: 7.5 [PH] (ref 5–8)
PH UR STRIP.AUTO: 7.5 [PH] (ref 5–8)
PLATELET # BLD AUTO: 141 K/UL (ref 164–446)
PMV BLD AUTO: 9.9 FL (ref 9–12.9)
POTASSIUM SERPL-SCNC: 3.1 MMOL/L (ref 3.6–5.5)
PROT UR QL STRIP: NEGATIVE MG/DL
PROT UR QL STRIP: NEGATIVE MG/DL
RBC # BLD AUTO: 3.02 M/UL (ref 4.7–6.1)
RBC UR QL AUTO: NEGATIVE
RBC UR QL AUTO: NEGATIVE
SODIUM SERPL-SCNC: 141 MMOL/L (ref 135–145)
SP GR UR STRIP.AUTO: 1
SP GR UR STRIP.AUTO: 1.01
UROBILINOGEN UR STRIP.AUTO-MCNC: 1 MG/DL
UROBILINOGEN UR STRIP.AUTO-MCNC: 1 MG/DL
WBC # BLD AUTO: 1.2 K/UL (ref 4.8–10.8)

## 2022-11-18 PROCEDURE — 81003 URINALYSIS AUTO W/O SCOPE: CPT | Mod: 91

## 2022-11-18 PROCEDURE — 700102 HCHG RX REV CODE 250 W/ 637 OVERRIDE(OP): Performed by: PEDIATRICS

## 2022-11-18 PROCEDURE — A9270 NON-COVERED ITEM OR SERVICE: HCPCS | Performed by: PEDIATRICS

## 2022-11-18 PROCEDURE — 80048 BASIC METABOLIC PNL TOTAL CA: CPT

## 2022-11-18 PROCEDURE — 80299 QUANTITATIVE ASSAY DRUG: CPT

## 2022-11-18 PROCEDURE — 700111 HCHG RX REV CODE 636 W/ 250 OVERRIDE (IP): Performed by: PEDIATRICS

## 2022-11-18 PROCEDURE — 700105 HCHG RX REV CODE 258: Performed by: PEDIATRICS

## 2022-11-18 PROCEDURE — 85025 COMPLETE CBC W/AUTO DIFF WBC: CPT

## 2022-11-18 PROCEDURE — 99238 HOSP IP/OBS DSCHRG MGMT 30/<: CPT | Performed by: PEDIATRICS

## 2022-11-18 PROCEDURE — RXMED WILLOW AMBULATORY MEDICATION CHARGE: Performed by: PEDIATRICS

## 2022-11-18 RX ORDER — HEPARIN SODIUM (PORCINE) LOCK FLUSH IV SOLN 100 UNIT/ML 100 UNIT/ML
300-500 SOLUTION INTRAVENOUS PRN
Status: DISCONTINUED | OUTPATIENT
Start: 2022-11-18 | End: 2022-11-18 | Stop reason: HOSPADM

## 2022-11-18 RX ADMIN — 0.12% CHLORHEXIDINE GLUCONATE 15 ML: 1.2 RINSE ORAL at 07:54

## 2022-11-18 RX ADMIN — HEPARIN 500 UNITS: 100 SYRINGE at 12:29

## 2022-11-18 RX ADMIN — LEUCOVORIN CALCIUM 28.1 MG: 350 INJECTION, POWDER, LYOPHILIZED, FOR SUSPENSION INTRAMUSCULAR; INTRAVENOUS at 07:55

## 2022-11-18 RX ADMIN — LEUCOVORIN CALCIUM 28.1 MG: 350 INJECTION, POWDER, LYOPHILIZED, FOR SUSPENSION INTRAMUSCULAR; INTRAVENOUS at 02:15

## 2022-11-18 ASSESSMENT — PAIN DESCRIPTION - PAIN TYPE: TYPE: ACUTE PAIN

## 2022-11-18 NOTE — CARE PLAN
The patient is Watcher - Medium risk of patient condition declining or worsening    Shift Goals  Clinical Goals: monitor urine pH, leucovorin, monitor labs  Patient Goals: rest  Family Goals: na    Progress made toward(s) clinical / shift goals:    Problem: Knowledge Deficit - Standard  Goal: Patient and family/care givers will demonstrate understanding of plan of care, disease process/condition, diagnostic tests and medications  Outcome: Progressing  Note: Agreeable to plan of care, denies nausea or pain. Ambulates independently in room, receiving IVF and leucovorin.         Patient is not progressing towards the following goals:

## 2022-11-18 NOTE — DISCHARGE PLANNING
CM notified by Care Team on 11-18-22 -- Pt anticipated to D/C to home, (has his mother for support) on 11-18-22.  Pt does not have any D/C needs.

## 2022-11-18 NOTE — PROGRESS NOTES
Pediatric Hematology/Oncology  Daily Progress Note      Patient Name:  Tomas Jean-Baptiste  : 2000  MRN: [id number]    Location of Service:  [facility] - Cancer Nursing Unit  Date of Service: 2022  Time: 8:18 PM    Hospital Day: 4    Protocol / Treatment Plan: [place] Chromosome Precursor B-Cell Acute Lymphoblastic Leukemia, ON STUDY [id number], Interim Maintenance, Day 43 (44) with HD-MTX    SUBJECTIVE:     Did well overnight. Remains afebrile with Tmax of 99.3 F. No reports of nausea or vomiting since yesterday morning. Reports that Zofran helps. Taking his oral chemotherapy tablets Imatinib and 6-mercaptopurine as prescribed.  No reports of abdominal pain/distension. Appetite remains below baseline. Stooling/voiding well. No reports of mouth sores/pain. Denies headache, tremors, abnormal movements or other neurologic changes. No rash, easy bruising.     Review of Systems:     Constitutional: Afebrile. Appetite below baseline  HENT: Negative for auditory changes or ear pain, no nosebleeds, no congestion and rhinorrhea, no sore throat.  No mouth sores.  Eyes: Negative for visual changes.  Respiratory: Negative for shortness of breath or noisy breathing.   Cardiovascular: Negative for chest pain and leg swelling.    Gastrointestinal: Negative for abdominal pain, diarrhea, constipation and blood in stool.  Previously reported nausea, vomiting much improved.  Genitourinary: Negative for dysuria.  Musculoskeletal: Negative for arm pain or leg pain.    Skin: Negative for rash or skin infection.  Neurological: Negative for numbness, tingling, sensory changes, weakness or headaches.    Endo/Heme/Allergies: No bruising/bleeding easily.    Psychiatric/Behavioral: Mood appropriate for age.    OBJECTIVE:     Max Temp: Temp (24hrs), Av.5 °C (97.7 °F), Min:36.2 °C (97.2 °F), Max:36.8 °C (98.3 °F)      Vitals: /61   Pulse 75   Temp 37.4 °C (99.4 °F) (Oral)   Resp 17   Ht 1.651  "m (5' 5\")   Wt 76.8 kg (169 lb 5 oz)   SpO2 96%   BMI 28.18 kg/m²     I/O:   Intake/Output Summary (Last 24 hours) at 11/17/2022 2020  Last data filed at 11/17/2022 1835  Gross per 24 hour   Intake --   Output 2800 ml   Net -2800 ml         Labs:    Most Recent Hematology Labs:    Lab Results   Component Value Date/Time    WBC 2.0 (L) 11/14/2022 0834    HEMOGLOBIN 11.0 (L) 11/14/2022 0834    .8 (H) 11/14/2022 0834    PLATELETCT 106 (L) 11/14/2022 0834    NEUTS 1.07 (L) 11/14/2022 0834       Most Recent Metabolic Panel:     Latest Reference Range & Units 11/17/22 02:08   Sodium 135 - 145 mmol/L 140   Potassium 3.6 - 5.5 mmol/L 3.2 (L)   Chloride 96 - 112 mmol/L 104   Co2 20 - 33 mmol/L 26   Anion Gap 7.0 - 16.0  10.0   Glucose 65 - 99 mg/dL 117 (H)   Bun 8 - 22 mg/dL 3 (L)   Creatinine 0.50 - 1.40 mg/dL 0.60   GFR (CKD-EPI) >60 mL/min/1.73 m 2 141   Calcium 8.5 - 10.5 mg/dL 8.5     Physical Exam:    Constitutional: Well appearing.  HENT: Normocephalic and atraumatic. No rhinorrhea. Oropharynx is clear and moist. Dry lips. Wearing glasses.  Eyes: Conjunctivae are normal. EOMI.   Neck: Normal range of motion of neck, no adenopathy.    Cardiovascular: Normal rate, regular rhythm.  No murmur. DP/radial pulses 2+, cap refill < 2 sec  Pulmonary/Chest: Effort normall. No respiratory distress. Symmetric expansion.  No crackles or wheezes.  Abdomen: Soft. Bowel sounds are normal. No distension and no mass. There is no hepatosplenomegaly.    Genitourinary:  Deferred  Musculoskeletal: Normal range of motion of lower and upper extremities bilaterally. No tenderness to palpation of elbows, writs, hands, knees, ankles and feet bilaterally.   Neurological: Alert and oriented to person and place. Exhibits normal muscle tone bilaterally in upper and lower extremities. Gait normal. Coordination normal.    Skin: Skin is warm, dry and pink.  No rash or evidence of skin infection. Previously noted crusted blood (scab) on R " upper arm looks much better, healed.  Psychiatric: Mood appropriate for age.    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    Clearing MTX appropriately.  UA showing pH between 7-8. No glucosuria, proteinuria resolved.       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  - Testicular status NEGATIVE       - Flow cytometry of both 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, FISH positive for BCR-ABL1 translocation   - Enrolled on [facility] [id number]  - Initially enrolled on [facility] [id number] - but 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           - Enrolled on [facility] [id number] and began study 6/13/2022  - Started imatinib therapy 6/3/2022 (split dosing of imatinib 400 mg PO QAM and 200 mg PO QPM) - continued at Day 15 of Induction 1A with imatinib 600 mg PO daily - remains 100% compliant with imatinib  - End of Induction IA and IB MRD negative             On Admission:  - WBC 2000/microliters, Hgb 11.0 gm/dL, platelets 106,000/microliters  - ANC 1070/microliters, /microliters  - Serum Creatinine 0.77 mg/dL (baseline 0.5-0.6)  - AST 25, ALT 74, bilirubin total 0.3      ** After overnight hydration, serum creatinine improved to 0.70 mg/dL on 11/15/2022, decision made to proceed with chemotherapy on 11/15/2022. Mtx infusion started at 0818 on 11/15/2022 and completed at 0800 on 11/16/2022. Serum creatinine of 0.75 mg/dL would be >125% from baseline .      [id number],  AR Arm B, Interim Maintenance, Day 46:   ** Vincristine 2 mg IV x 1 dose (Completed)  ** Methotrexate 935 IV over 30 minutes (Completed)  ** Methotrexate 8415 mg IV Over 24.5 hours starting on Day 43[44] (Completed)  ** Leucovorin 28 mg IV Q6H starting at hour 42 from start of methotrexate, minimum of 3 doses (42 hr, 48hr and 54 hr). First dose given at 0200 am on 11/17/2022  **Continue mercaptopurine 50 mg = 1 tab PO  x 6 days of the week, 25 mg = 0.5 tabs x 1 day/week)  ** Took Imatinib 400 mg PO on 11/14 AM - s/p 200 mg dose on 11/14 PM while in hospital (no splitting tabs while in hospital)  ** Starting 11/15/2022 will administer Imatinib 600 mg PO QAM until discharged    - IV fluids per protocol, prehydration x 2 hours and until urine specific gravity < 1.010 and pH > 7  - D5 1/4 NS + 30 mEq sodium bicarbonate at 250 ml/hr (=125 ml/m2/hr)  - Will monitor MTX levels at 24hr, (36 hr if indicated) 42hr, 48hr then daily until MTX <0.1 uM    **24 hr Mtx level drawn at 0820 was noted to be 11.70 umol/L (with 1:10 dilution). However, if corrected for 1:100 dilution, the Mtx level at 24 hr would be 68 umol/L.   **Repeated the Mtx level at hr 26 and 25 minutes which was 9.70 umol/L, which when corrected for 1:100 dilution is 23 umol/L.   ** 42 hr level at 0220 am on 11/17/2022 is 0.43 umol/L.   **48 hour level at 0828 am on 11/17/2022 is 0.24 umol/L.                   2) Chemotherapy Related Pancytopenia:  - WBC 2000/microliters, Hgb 11.0 gm/dL, platelets 106,000/microliters  - ANC 1070/microliters, /microliters  - Transfuse to maintain hemoglobin >7 gm/dL or with symptoms  - Transfuse to maintain platelets >10,000/microliters or symptoms  - No indication for transfusion    3) At Risk of Opportunistic Infection:  - Holding Bactrim BID SS for PJP prophylaxis this past weekend, can resume 72 hours after clearance of methotrexate  - Chlorhexidine mouth wash 4 times daily    4) Nausea and Vomiting Secondary To  Chemotherapy:  - Receiving scheduled Zofran 8 mg every 8 hrs  - Ativan and Phenergan available for breakthrough: Patient declining PRN medications currently     5) Sixth Cranial Nerve Palsy (IMPROVED/RESOLVED):   - Followed by Dr. Carranza  - Improvement/Resolution of palsy.      6) Anxiety (IMPROVED GREATLY):     7) Social:   - Continue with support  - Continue school as tolerated     8) Access:   - R Port-A-Cath in place  - EMLA cream prior to access      9) Research Participant:           [facility] - Source Data         Diagnosis: Ph+ Precursor B-Cell Acute Lymphoblastic Leukemia     Disease Status: EOI1A MRD NEGATIVE, EOI1B RD NEGATIVE, CNS3c, testicular negative, HSV1 IgG POSITIVE, CMV IgG NEGATIVE, VARICELLA IgG POSITIVE     Active Studies: [id number], [id number]                                                                                                      Inactive Studies: [id number]                                                                                                                                                Optional Studies: None             Protocol: International Phase 3 trial in [place] 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      Treatment Plan: [id number](OS), AR-Arm B, Interim Maintenance, Day 46     Height: 1.680 m      Weight: 74.8 kg       BSA: 1.87 m²   (Start of Interim Maintenance 9/29/2022)                                                                                                                                           Performance Status: Karnofsky 100, ECOG  0 (updated 11/14/2022)     Treatment Plan Medications:  (verified 100% compliance)     Currently taking imatinib 400 mg PO QAM and 250 mg QPM  Currently taking mercaptopurine 1 tablet PO QHS Mon-Sat and 0.5 tablet PO QHS Sun     Evaluations / Study Labs:  (11/17/2022):   None     Therapy Given:  (11/17/2022): No IV chemotherapy. Only PO chemotherapy as above.     Vincristine 2 mg IV (COMPLETED 11/15/2022)  Methotrexate 9350 mg IV (5 g/m²)  (Completed on 11/16/2022)  Mercaptopurine 1 tablet PO daily x 6 days and 0.5 tablets x 1 day (CONTINUED)  Imatinib 600 mg PO daily (CONTINUED)         Disposition: Admit to inpatient for High-dose Methotrexate, discharge pending clearance of methotrexate    Alyce Duenas MD  Pediatric Hematology / Oncology  [facility]  Cell.  [phone]  [facility]. [phone]

## 2022-11-18 NOTE — CARE PLAN
The patient is Stable - Low risk of patient condition declining or worsening    Shift Goals  Clinical Goals: methrotexate levels, monitor urine pH  Patient Goals: sleep  Family Goals: na      Progress made toward(s) clinical / shift goals:      Problem: Knowledge Deficit - Standard  Goal: Patient and family/care givers will demonstrate understanding of plan of care, disease process/condition, diagnostic tests and medications  Outcome: Progressing  Note: A/Ox4, pt is able to understand plan of care.  Blood return noted from port, dressing clean dry and intact. Fluids running. All questions answered at the moment.       Problem: Urinary Elimination  Goal: Establish and maintain regular urinary output  Outcome: Progressing     Problem: Bowel Elimination  Goal: Establish and maintain regular bowel function  Outcome: Progressing     Problem: Self Care  Goal: Patient will have the ability to perform ADLs independently or with assistance (bathe, groom, dress, toilet and feed)  Outcome: Progressing     Problem: Fall Risk  Goal: Patient will remain free from falls  Outcome: Progressing  Fall precautions in place, bed in lowest position, call light and belongings in reach.   Pt calls appropriately.         Patient is not progressing towards the following goals:

## 2022-11-18 NOTE — PROGRESS NOTES
Reviewed discharge AVS with patient all questions answered verbalized understanding. Patient is alert and oriented denies N/V or pain, VS stable, all belongings gathered, port de accessed. Walked to lobby for discharge, ride provided by mother.
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
## 2022-11-30 DIAGNOSIS — C91.00 PHILADELPHIA CHROMOSOME POSITIVE ACUTE LYMPHOBLASTIC LEUKEMIA (HCC): ICD-10-CM

## 2022-11-30 RX ORDER — SODIUM CHLORIDE 9 MG/ML
500 INJECTION, SOLUTION INTRAVENOUS CONTINUOUS
Status: CANCELLED | OUTPATIENT
Start: 2023-01-19

## 2022-11-30 RX ORDER — SODIUM CHLORIDE 9 MG/ML
500 INJECTION, SOLUTION INTRAVENOUS CONTINUOUS
Status: CANCELLED | OUTPATIENT
Start: 2023-01-25

## 2022-11-30 RX ORDER — LIDOCAINE AND PRILOCAINE 25; 25 MG/G; MG/G
CREAM TOPICAL PRN
Status: CANCELLED | OUTPATIENT
Start: 2023-01-19

## 2022-11-30 RX ORDER — ONDANSETRON 2 MG/ML
8 INJECTION INTRAMUSCULAR; INTRAVENOUS EVERY 8 HOURS PRN
Status: CANCELLED | OUTPATIENT
Start: 2022-12-14

## 2022-11-30 RX ORDER — ONDANSETRON 2 MG/ML
8 INJECTION INTRAMUSCULAR; INTRAVENOUS EVERY 8 HOURS PRN
Status: CANCELLED | OUTPATIENT
Start: 2022-12-21

## 2022-11-30 RX ORDER — ONDANSETRON 2 MG/ML
8 INJECTION INTRAMUSCULAR; INTRAVENOUS ONCE
Status: CANCELLED | OUTPATIENT
Start: 2023-01-04

## 2022-11-30 RX ORDER — SODIUM CHLORIDE 9 MG/ML
500 INJECTION, SOLUTION INTRAVENOUS CONTINUOUS
Status: CANCELLED | OUTPATIENT
Start: 2022-12-14

## 2022-11-30 RX ORDER — LIDOCAINE AND PRILOCAINE 25; 25 MG/G; MG/G
CREAM TOPICAL PRN
Status: CANCELLED | OUTPATIENT
Start: 2022-12-07

## 2022-11-30 RX ORDER — PROMETHAZINE HYDROCHLORIDE 6.25 MG/5ML
0.25 SYRUP ORAL EVERY 6 HOURS PRN
Status: CANCELLED | OUTPATIENT
Start: 2023-01-12

## 2022-11-30 RX ORDER — SODIUM CHLORIDE 9 MG/ML
500 INJECTION, SOLUTION INTRAVENOUS CONTINUOUS
Status: CANCELLED | OUTPATIENT
Start: 2023-01-20

## 2022-11-30 RX ORDER — ONDANSETRON 2 MG/ML
8 INJECTION INTRAMUSCULAR; INTRAVENOUS EVERY 8 HOURS PRN
Status: CANCELLED | OUTPATIENT
Start: 2023-01-14

## 2022-11-30 RX ORDER — ONDANSETRON 2 MG/ML
8 INJECTION INTRAMUSCULAR; INTRAVENOUS EVERY 8 HOURS PRN
Status: CANCELLED | OUTPATIENT
Start: 2023-01-04

## 2022-11-30 RX ORDER — LORAZEPAM 2 MG/ML
1 CONCENTRATE ORAL EVERY 6 HOURS PRN
Status: CANCELLED | OUTPATIENT
Start: 2023-01-25

## 2022-11-30 RX ORDER — SODIUM CHLORIDE 9 MG/ML
500 INJECTION, SOLUTION INTRAVENOUS CONTINUOUS
Status: CANCELLED | OUTPATIENT
Start: 2023-01-26

## 2022-11-30 RX ORDER — SODIUM CHLORIDE 9 MG/ML
500 INJECTION, SOLUTION INTRAVENOUS CONTINUOUS
Status: CANCELLED | OUTPATIENT
Start: 2023-01-31

## 2022-11-30 RX ORDER — LORAZEPAM 2 MG/ML
1 CONCENTRATE ORAL EVERY 6 HOURS PRN
Status: CANCELLED | OUTPATIENT
Start: 2022-12-21

## 2022-11-30 RX ORDER — SODIUM CHLORIDE 9 MG/ML
500 INJECTION, SOLUTION INTRAVENOUS CONTINUOUS
Status: CANCELLED | OUTPATIENT
Start: 2022-12-10

## 2022-11-30 RX ORDER — PROMETHAZINE HYDROCHLORIDE 6.25 MG/5ML
0.25 SYRUP ORAL EVERY 6 HOURS PRN
Status: CANCELLED | OUTPATIENT
Start: 2022-12-14

## 2022-11-30 RX ORDER — LORAZEPAM 2 MG/ML
1 CONCENTRATE ORAL EVERY 6 HOURS PRN
Status: CANCELLED | OUTPATIENT
Start: 2023-01-26

## 2022-11-30 RX ORDER — PROMETHAZINE HYDROCHLORIDE 6.25 MG/5ML
0.25 SYRUP ORAL EVERY 6 HOURS PRN
Status: CANCELLED | OUTPATIENT
Start: 2023-01-05

## 2022-11-30 RX ORDER — ONDANSETRON 2 MG/ML
8 INJECTION INTRAMUSCULAR; INTRAVENOUS EVERY 8 HOURS PRN
Status: CANCELLED | OUTPATIENT
Start: 2023-01-25

## 2022-11-30 RX ORDER — ONDANSETRON 2 MG/ML
8 INJECTION INTRAMUSCULAR; INTRAVENOUS EVERY 8 HOURS PRN
Status: CANCELLED | OUTPATIENT
Start: 2023-01-12

## 2022-11-30 RX ORDER — LIDOCAINE AND PRILOCAINE 25; 25 MG/G; MG/G
CREAM TOPICAL PRN
Status: CANCELLED | OUTPATIENT
Start: 2023-01-26

## 2022-11-30 RX ORDER — ONDANSETRON 2 MG/ML
8 INJECTION INTRAMUSCULAR; INTRAVENOUS EVERY 8 HOURS PRN
Status: CANCELLED | OUTPATIENT
Start: 2023-01-05

## 2022-11-30 RX ORDER — ONDANSETRON 2 MG/ML
8 INJECTION INTRAMUSCULAR; INTRAVENOUS ONCE
Status: CANCELLED | OUTPATIENT
Start: 2022-12-21

## 2022-11-30 RX ORDER — ONDANSETRON 2 MG/ML
8 INJECTION INTRAMUSCULAR; INTRAVENOUS ONCE
Status: CANCELLED | OUTPATIENT
Start: 2023-01-05

## 2022-11-30 RX ORDER — LORAZEPAM 2 MG/ML
1 CONCENTRATE ORAL EVERY 6 HOURS PRN
Status: CANCELLED | OUTPATIENT
Start: 2022-12-07

## 2022-11-30 RX ORDER — ONDANSETRON 2 MG/ML
8 INJECTION INTRAMUSCULAR; INTRAVENOUS ONCE
Status: CANCELLED | OUTPATIENT
Start: 2023-01-11

## 2022-11-30 RX ORDER — ONDANSETRON 2 MG/ML
8 INJECTION INTRAMUSCULAR; INTRAVENOUS ONCE
Status: CANCELLED | OUTPATIENT
Start: 2023-01-07

## 2022-11-30 RX ORDER — ONDANSETRON 2 MG/ML
8 INJECTION INTRAMUSCULAR; INTRAVENOUS EVERY 8 HOURS PRN
Status: CANCELLED | OUTPATIENT
Start: 2023-01-07

## 2022-11-30 RX ORDER — SODIUM CHLORIDE 9 MG/ML
500 INJECTION, SOLUTION INTRAVENOUS CONTINUOUS
Status: CANCELLED | OUTPATIENT
Start: 2023-01-24

## 2022-11-30 RX ORDER — SODIUM CHLORIDE 9 MG/ML
500 INJECTION, SOLUTION INTRAVENOUS CONTINUOUS
Status: CANCELLED | OUTPATIENT
Start: 2023-01-27

## 2022-11-30 RX ORDER — ONDANSETRON 2 MG/ML
8 INJECTION INTRAMUSCULAR; INTRAVENOUS ONCE
Status: CANCELLED | OUTPATIENT
Start: 2022-12-14

## 2022-11-30 RX ORDER — DIPHENHYDRAMINE HYDROCHLORIDE 50 MG/ML
50 INJECTION INTRAMUSCULAR; INTRAVENOUS PRN
Status: CANCELLED | OUTPATIENT
Start: 2022-12-10

## 2022-11-30 RX ORDER — ONDANSETRON 2 MG/ML
8 INJECTION INTRAMUSCULAR; INTRAVENOUS ONCE
Status: CANCELLED | OUTPATIENT
Start: 2022-12-07

## 2022-11-30 RX ORDER — ONDANSETRON 2 MG/ML
8 INJECTION INTRAMUSCULAR; INTRAVENOUS EVERY 8 HOURS PRN
Status: CANCELLED | OUTPATIENT
Start: 2022-12-07

## 2022-11-30 RX ORDER — PROMETHAZINE HYDROCHLORIDE 6.25 MG/5ML
0.25 SYRUP ORAL EVERY 6 HOURS PRN
Status: CANCELLED | OUTPATIENT
Start: 2023-01-11

## 2022-11-30 RX ORDER — ONDANSETRON 2 MG/ML
8 INJECTION INTRAMUSCULAR; INTRAVENOUS EVERY 8 HOURS PRN
Status: CANCELLED | OUTPATIENT
Start: 2023-01-06

## 2022-11-30 RX ORDER — LIDOCAINE AND PRILOCAINE 25; 25 MG/G; MG/G
CREAM TOPICAL PRN
Status: CANCELLED | OUTPATIENT
Start: 2023-02-07

## 2022-11-30 RX ORDER — ONDANSETRON 2 MG/ML
8 INJECTION INTRAMUSCULAR; INTRAVENOUS ONCE
Status: CANCELLED | OUTPATIENT
Start: 2023-01-13

## 2022-11-30 RX ORDER — LORAZEPAM 2 MG/ML
1 CONCENTRATE ORAL EVERY 6 HOURS PRN
Status: CANCELLED | OUTPATIENT
Start: 2023-01-31

## 2022-11-30 RX ORDER — SODIUM CHLORIDE 9 MG/ML
20 INJECTION, SOLUTION INTRAVENOUS PRN
Status: CANCELLED | OUTPATIENT
Start: 2023-01-04

## 2022-11-30 RX ORDER — LORAZEPAM 2 MG/ML
1 CONCENTRATE ORAL EVERY 6 HOURS PRN
Status: CANCELLED | OUTPATIENT
Start: 2023-01-04

## 2022-11-30 RX ORDER — SODIUM CHLORIDE 9 MG/ML
500 INJECTION, SOLUTION INTRAVENOUS CONTINUOUS
Status: CANCELLED | OUTPATIENT
Start: 2023-02-07

## 2022-11-30 RX ORDER — PROMETHAZINE HYDROCHLORIDE 6.25 MG/5ML
0.25 SYRUP ORAL EVERY 6 HOURS PRN
Status: CANCELLED | OUTPATIENT
Start: 2023-01-14

## 2022-11-30 RX ORDER — DEXTROSE MONOHYDRATE, SODIUM CHLORIDE, AND POTASSIUM CHLORIDE 50; 1.49; 4.5 G/1000ML; G/1000ML; G/1000ML
INJECTION, SOLUTION INTRAVENOUS CONTINUOUS
Status: CANCELLED | OUTPATIENT
Start: 2023-01-04

## 2022-11-30 RX ORDER — LORAZEPAM 2 MG/ML
1 CONCENTRATE ORAL EVERY 6 HOURS PRN
Status: CANCELLED | OUTPATIENT
Start: 2023-01-27

## 2022-11-30 RX ORDER — LORAZEPAM 2 MG/ML
1 CONCENTRATE ORAL EVERY 6 HOURS PRN
Status: CANCELLED | OUTPATIENT
Start: 2023-01-20

## 2022-11-30 RX ORDER — LORAZEPAM 2 MG/ML
1 CONCENTRATE ORAL EVERY 6 HOURS PRN
Status: CANCELLED | OUTPATIENT
Start: 2023-01-18

## 2022-11-30 RX ORDER — PROMETHAZINE HYDROCHLORIDE 6.25 MG/5ML
0.25 SYRUP ORAL EVERY 6 HOURS PRN
Status: CANCELLED | OUTPATIENT
Start: 2023-01-25

## 2022-11-30 RX ORDER — DIPHENHYDRAMINE HYDROCHLORIDE 50 MG/ML
50 INJECTION INTRAMUSCULAR; INTRAVENOUS ONCE
Status: CANCELLED | OUTPATIENT
Start: 2023-01-18 | End: 2023-01-17

## 2022-11-30 RX ORDER — LORAZEPAM 2 MG/ML
1 CONCENTRATE ORAL EVERY 6 HOURS PRN
Status: CANCELLED | OUTPATIENT
Start: 2023-01-24

## 2022-11-30 RX ORDER — LIDOCAINE AND PRILOCAINE 25; 25 MG/G; MG/G
CREAM TOPICAL PRN
Status: CANCELLED | OUTPATIENT
Start: 2023-01-24

## 2022-11-30 RX ORDER — MIDAZOLAM HYDROCHLORIDE 1 MG/ML
2 INJECTION INTRAMUSCULAR; INTRAVENOUS ONCE
Status: CANCELLED
Start: 2022-12-07 | End: 2022-12-06

## 2022-11-30 RX ORDER — LORAZEPAM 2 MG/ML
1 CONCENTRATE ORAL EVERY 6 HOURS PRN
Status: CANCELLED | OUTPATIENT
Start: 2023-01-19

## 2022-11-30 RX ORDER — DIPHENHYDRAMINE HYDROCHLORIDE 50 MG/ML
50 INJECTION INTRAMUSCULAR; INTRAVENOUS ONCE
Status: CANCELLED | OUTPATIENT
Start: 2022-12-10 | End: 2022-12-09

## 2022-11-30 RX ORDER — LIDOCAINE AND PRILOCAINE 25; 25 MG/G; MG/G
CREAM TOPICAL PRN
Status: CANCELLED | OUTPATIENT
Start: 2022-12-10

## 2022-11-30 RX ORDER — SODIUM CHLORIDE 9 MG/ML
500 INJECTION, SOLUTION INTRAVENOUS CONTINUOUS
Status: CANCELLED | OUTPATIENT
Start: 2023-01-04

## 2022-11-30 RX ORDER — EPINEPHRINE 1 MG/ML(1)
0.5 AMPUL (ML) INJECTION PRN
Status: CANCELLED | OUTPATIENT
Start: 2022-12-10

## 2022-11-30 RX ORDER — ONDANSETRON 2 MG/ML
8 INJECTION INTRAMUSCULAR; INTRAVENOUS ONCE
Status: CANCELLED | OUTPATIENT
Start: 2023-01-14

## 2022-11-30 RX ORDER — ONDANSETRON 2 MG/ML
8 INJECTION INTRAMUSCULAR; INTRAVENOUS EVERY 8 HOURS PRN
Status: CANCELLED | OUTPATIENT
Start: 2023-01-13

## 2022-11-30 RX ORDER — PROMETHAZINE HYDROCHLORIDE 6.25 MG/5ML
0.25 SYRUP ORAL EVERY 6 HOURS PRN
Status: CANCELLED | OUTPATIENT
Start: 2022-12-21

## 2022-11-30 RX ORDER — LORAZEPAM 2 MG/ML
1 CONCENTRATE ORAL EVERY 6 HOURS PRN
Status: CANCELLED | OUTPATIENT
Start: 2023-02-07

## 2022-11-30 RX ORDER — ONDANSETRON 2 MG/ML
8 INJECTION INTRAMUSCULAR; INTRAVENOUS ONCE
Status: CANCELLED | OUTPATIENT
Start: 2023-01-06

## 2022-11-30 RX ORDER — PROMETHAZINE HYDROCHLORIDE 6.25 MG/5ML
0.25 SYRUP ORAL EVERY 6 HOURS PRN
Status: CANCELLED | OUTPATIENT
Start: 2023-01-07

## 2022-11-30 RX ORDER — PROMETHAZINE HYDROCHLORIDE 6.25 MG/5ML
0.25 SYRUP ORAL EVERY 6 HOURS PRN
Status: CANCELLED | OUTPATIENT
Start: 2022-12-07

## 2022-11-30 RX ORDER — LIDOCAINE AND PRILOCAINE 25; 25 MG/G; MG/G
CREAM TOPICAL PRN
Status: CANCELLED | OUTPATIENT
Start: 2023-01-18

## 2022-11-30 RX ORDER — PROMETHAZINE HYDROCHLORIDE 6.25 MG/5ML
0.25 SYRUP ORAL EVERY 6 HOURS PRN
Status: CANCELLED | OUTPATIENT
Start: 2023-01-18

## 2022-11-30 RX ORDER — LIDOCAINE AND PRILOCAINE 25; 25 MG/G; MG/G
CREAM TOPICAL PRN
Status: CANCELLED | OUTPATIENT
Start: 2023-01-25

## 2022-11-30 RX ORDER — PROMETHAZINE HYDROCHLORIDE 6.25 MG/5ML
0.25 SYRUP ORAL EVERY 6 HOURS PRN
Status: CANCELLED | OUTPATIENT
Start: 2023-01-13

## 2022-11-30 RX ORDER — LIDOCAINE AND PRILOCAINE 25; 25 MG/G; MG/G
CREAM TOPICAL PRN
Status: CANCELLED | OUTPATIENT
Start: 2022-12-21

## 2022-11-30 RX ORDER — LORAZEPAM 2 MG/ML
1 CONCENTRATE ORAL EVERY 6 HOURS PRN
Status: CANCELLED | OUTPATIENT
Start: 2022-12-14

## 2022-11-30 RX ORDER — PROMETHAZINE HYDROCHLORIDE 6.25 MG/5ML
0.25 SYRUP ORAL EVERY 6 HOURS PRN
Status: CANCELLED | OUTPATIENT
Start: 2023-01-04

## 2022-11-30 RX ORDER — LIDOCAINE AND PRILOCAINE 25; 25 MG/G; MG/G
CREAM TOPICAL PRN
Status: CANCELLED | OUTPATIENT
Start: 2023-01-27

## 2022-11-30 RX ORDER — SODIUM CHLORIDE 9 MG/ML
500 INJECTION, SOLUTION INTRAVENOUS CONTINUOUS
Status: CANCELLED | OUTPATIENT
Start: 2022-12-07

## 2022-11-30 RX ORDER — EPINEPHRINE 1 MG/ML(1)
0.5 AMPUL (ML) INJECTION PRN
Status: CANCELLED | OUTPATIENT
Start: 2023-01-31

## 2022-11-30 RX ORDER — ONDANSETRON 2 MG/ML
8 INJECTION INTRAMUSCULAR; INTRAVENOUS ONCE
Status: CANCELLED | OUTPATIENT
Start: 2023-01-12

## 2022-11-30 RX ORDER — SODIUM CHLORIDE 9 MG/ML
500 INJECTION, SOLUTION INTRAVENOUS CONTINUOUS
Status: CANCELLED | OUTPATIENT
Start: 2022-12-21

## 2022-11-30 RX ORDER — LIDOCAINE AND PRILOCAINE 25; 25 MG/G; MG/G
CREAM TOPICAL PRN
Status: CANCELLED | OUTPATIENT
Start: 2022-12-14

## 2022-11-30 RX ORDER — LIDOCAINE AND PRILOCAINE 25; 25 MG/G; MG/G
CREAM TOPICAL PRN
Status: CANCELLED | OUTPATIENT
Start: 2023-01-20

## 2022-11-30 RX ORDER — ONDANSETRON 2 MG/ML
8 INJECTION INTRAMUSCULAR; INTRAVENOUS ONCE
Status: CANCELLED | OUTPATIENT
Start: 2023-01-18

## 2022-11-30 RX ORDER — ONDANSETRON 2 MG/ML
8 INJECTION INTRAMUSCULAR; INTRAVENOUS EVERY 8 HOURS PRN
Status: CANCELLED | OUTPATIENT
Start: 2023-01-18

## 2022-11-30 RX ORDER — ONDANSETRON 2 MG/ML
8 INJECTION INTRAMUSCULAR; INTRAVENOUS EVERY 8 HOURS PRN
Status: CANCELLED | OUTPATIENT
Start: 2023-01-11

## 2022-11-30 RX ORDER — LIDOCAINE AND PRILOCAINE 25; 25 MG/G; MG/G
CREAM TOPICAL PRN
Status: CANCELLED | OUTPATIENT
Start: 2023-01-04

## 2022-11-30 RX ORDER — DIPHENHYDRAMINE HYDROCHLORIDE 50 MG/ML
50 INJECTION INTRAMUSCULAR; INTRAVENOUS PRN
Status: CANCELLED | OUTPATIENT
Start: 2023-01-18

## 2022-11-30 RX ORDER — PROMETHAZINE HYDROCHLORIDE 6.25 MG/5ML
0.25 SYRUP ORAL EVERY 6 HOURS PRN
Status: CANCELLED | OUTPATIENT
Start: 2023-01-06

## 2022-11-30 RX ORDER — SODIUM CHLORIDE 9 MG/ML
500 INJECTION, SOLUTION INTRAVENOUS CONTINUOUS
Status: CANCELLED | OUTPATIENT
Start: 2023-01-18

## 2022-11-30 RX ORDER — LIDOCAINE AND PRILOCAINE 25; 25 MG/G; MG/G
CREAM TOPICAL PRN
Status: CANCELLED | OUTPATIENT
Start: 2023-01-31

## 2022-12-06 ENCOUNTER — HOSPITAL ENCOUNTER (OUTPATIENT)
Dept: INFUSION CENTER | Facility: MEDICAL CENTER | Age: 22
End: 2022-12-06
Attending: PEDIATRICS
Payer: COMMERCIAL

## 2022-12-06 VITALS
WEIGHT: 166.45 LBS | DIASTOLIC BLOOD PRESSURE: 65 MMHG | BODY MASS INDEX: 27.73 KG/M2 | SYSTOLIC BLOOD PRESSURE: 121 MMHG | TEMPERATURE: 99.4 F | HEIGHT: 65 IN | HEART RATE: 87 BPM | OXYGEN SATURATION: 99 % | RESPIRATION RATE: 18 BRPM

## 2022-12-06 DIAGNOSIS — C91.Z0 B LYMPHOBLASTIC LEUKEMIA WITH T(9;22)(Q34;Q11.2);BCR-ABL1 (HCC): ICD-10-CM

## 2022-12-06 DIAGNOSIS — Z51.11 ENCOUNTER FOR CHEMOTHERAPY MANAGEMENT: ICD-10-CM

## 2022-12-06 DIAGNOSIS — C91.00 PHILADELPHIA CHROMOSOME POSITIVE ACUTE LYMPHOBLASTIC LEUKEMIA (HCC): ICD-10-CM

## 2022-12-06 LAB
ALBUMIN SERPL BCP-MCNC: 4.7 G/DL (ref 3.2–4.9)
ALBUMIN/GLOB SERPL: 2.8 G/DL
ALP SERPL-CCNC: 63 U/L (ref 30–99)
ALT SERPL-CCNC: 35 U/L (ref 2–50)
ANION GAP SERPL CALC-SCNC: 10 MMOL/L (ref 7–16)
ANISOCYTOSIS BLD QL SMEAR: ABNORMAL
AST SERPL-CCNC: 21 U/L (ref 12–45)
BASOPHILS # BLD AUTO: 0.5 % (ref 0–1.8)
BASOPHILS # BLD: 0.01 K/UL (ref 0–0.12)
BILIRUB CONJ SERPL-MCNC: <0.2 MG/DL (ref 0.1–0.5)
BILIRUB INDIRECT SERPL-MCNC: NORMAL MG/DL (ref 0–1)
BILIRUB SERPL-MCNC: 0.3 MG/DL (ref 0.1–1.5)
BUN SERPL-MCNC: 11 MG/DL (ref 8–22)
BURR CELLS/RBC NFR CSF MANUAL: 0 %
CALCIUM SERPL-MCNC: 8.4 MG/DL (ref 8.5–10.5)
CHLORIDE SERPL-SCNC: 108 MMOL/L (ref 96–112)
CLARITY CSF: CLEAR
CO2 SERPL-SCNC: 22 MMOL/L (ref 20–33)
COLOR CSF: COLORLESS
COLOR SPUN CSF: COLORLESS
COMMENT 1642: NORMAL
CREAT SERPL-MCNC: 0.68 MG/DL (ref 0.5–1.4)
CYTOLOGY REG CYTOL: NORMAL
EOSINOPHIL # BLD AUTO: 0.05 K/UL (ref 0–0.51)
EOSINOPHIL NFR BLD: 2.7 % (ref 0–6.9)
ERYTHROCYTE [DISTWIDTH] IN BLOOD BY AUTOMATED COUNT: 67.7 FL (ref 35.9–50)
GFR SERPLBLD CREATININE-BSD FMLA CKD-EPI: 135 ML/MIN/1.73 M 2
GLOBULIN SER CALC-MCNC: 1.7 G/DL (ref 1.9–3.5)
GLUCOSE SERPL-MCNC: 110 MG/DL (ref 65–99)
HCT VFR BLD AUTO: 32.1 % (ref 42–52)
HGB BLD-MCNC: 10.9 G/DL (ref 14–18)
IMM GRANULOCYTES # BLD AUTO: 0.01 K/UL (ref 0–0.11)
IMM GRANULOCYTES NFR BLD AUTO: 0.5 % (ref 0–0.9)
LYMPHOCYTES # BLD AUTO: 0.53 K/UL (ref 1–4.8)
LYMPHOCYTES NFR BLD: 28.6 % (ref 22–41)
LYMPHOCYTES NFR CSF: 95 %
MACROCYTES BLD QL SMEAR: ABNORMAL
MCH RBC QN AUTO: 35.3 PG (ref 27–33)
MCHC RBC AUTO-ENTMCNC: 34 G/DL (ref 33.7–35.3)
MCV RBC AUTO: 103.9 FL (ref 81.4–97.8)
MICROCYTES BLD QL SMEAR: ABNORMAL
MONOCYTES # BLD AUTO: 0.29 K/UL (ref 0–0.85)
MONOCYTES NFR BLD AUTO: 15.7 % (ref 0–13.4)
MONONUC CELLS NFR CSF: 5 %
MORPHOLOGY BLD-IMP: NORMAL
NEUTROPHILS # BLD AUTO: 0.96 K/UL (ref 1.82–7.42)
NEUTROPHILS NFR BLD: 52 % (ref 44–72)
NRBC # BLD AUTO: 0 K/UL
NRBC BLD-RTO: 0 /100 WBC
PLATELET # BLD AUTO: 324 K/UL (ref 164–446)
PLATELET BLD QL SMEAR: NORMAL
PMV BLD AUTO: 9.2 FL (ref 9–12.9)
POTASSIUM SERPL-SCNC: 3.6 MMOL/L (ref 3.6–5.5)
PROT SERPL-MCNC: 6.4 G/DL (ref 6–8.2)
RBC # BLD AUTO: 3.09 M/UL (ref 4.7–6.1)
RBC # CSF: <1 CELLS/UL
RBC BLD AUTO: PRESENT
SODIUM SERPL-SCNC: 140 MMOL/L (ref 135–145)
SPECIMEN VOL CSF: 2 ML
TUBE # CSF: 2
TUBE # CSF: 2
WBC # BLD AUTO: 1.9 K/UL (ref 4.8–10.8)
WBC # CSF: <1 CELLS/UL (ref 0–10)

## 2022-12-06 PROCEDURE — 36591 DRAW BLOOD OFF VENOUS DEVICE: CPT

## 2022-12-06 PROCEDURE — 700101 HCHG RX REV CODE 250: Performed by: PEDIATRICS

## 2022-12-06 PROCEDURE — A4212 NON CORING NEEDLE OR STYLET: HCPCS

## 2022-12-06 PROCEDURE — 96375 TX/PRO/DX INJ NEW DRUG ADDON: CPT

## 2022-12-06 PROCEDURE — 700105 HCHG RX REV CODE 258: Performed by: PEDIATRICS

## 2022-12-06 PROCEDURE — 85025 COMPLETE CBC W/AUTO DIFF WBC: CPT

## 2022-12-06 PROCEDURE — 96411 CHEMO IV PUSH ADDL DRUG: CPT

## 2022-12-06 PROCEDURE — 96409 CHEMO IV PUSH SNGL DRUG: CPT

## 2022-12-06 PROCEDURE — 99214 OFFICE O/P EST MOD 30 MIN: CPT | Performed by: PEDIATRICS

## 2022-12-06 PROCEDURE — 80053 COMPREHEN METABOLIC PANEL: CPT

## 2022-12-06 PROCEDURE — 82248 BILIRUBIN DIRECT: CPT

## 2022-12-06 PROCEDURE — 700111 HCHG RX REV CODE 636 W/ 250 OVERRIDE (IP): Performed by: PEDIATRICS

## 2022-12-06 PROCEDURE — 88108 CYTOPATH CONCENTRATE TECH: CPT

## 2022-12-06 PROCEDURE — 89051 BODY FLUID CELL COUNT: CPT

## 2022-12-06 PROCEDURE — 96367 TX/PROPH/DG ADDL SEQ IV INF: CPT

## 2022-12-06 PROCEDURE — 96450 CHEMOTHERAPY INTO CNS: CPT | Performed by: PEDIATRICS

## 2022-12-06 RX ORDER — ONDANSETRON 2 MG/ML
8 INJECTION INTRAMUSCULAR; INTRAVENOUS ONCE
Status: CANCELLED | OUTPATIENT
Start: 2023-01-20

## 2022-12-06 RX ORDER — HEPARIN SODIUM (PORCINE) LOCK FLUSH IV SOLN 100 UNIT/ML 100 UNIT/ML
500 SOLUTION INTRAVENOUS PRN
Status: CANCELLED | OUTPATIENT
Start: 2022-12-06

## 2022-12-06 RX ORDER — PROMETHAZINE HYDROCHLORIDE 6.25 MG/5ML
0.25 SYRUP ORAL EVERY 6 HOURS PRN
Status: CANCELLED | OUTPATIENT
Start: 2023-01-26

## 2022-12-06 RX ORDER — ONDANSETRON 2 MG/ML
8 INJECTION INTRAMUSCULAR; INTRAVENOUS ONCE
Status: CANCELLED | OUTPATIENT
Start: 2023-01-27

## 2022-12-06 RX ORDER — PROMETHAZINE HYDROCHLORIDE 6.25 MG/5ML
0.25 SYRUP ORAL EVERY 6 HOURS PRN
Status: CANCELLED | OUTPATIENT
Start: 2023-01-27

## 2022-12-06 RX ORDER — 0.9 % SODIUM CHLORIDE 0.9 %
20 VIAL (ML) INJECTION PRN
Status: CANCELLED | OUTPATIENT
Start: 2022-12-06

## 2022-12-06 RX ORDER — ONDANSETRON 2 MG/ML
8 INJECTION INTRAMUSCULAR; INTRAVENOUS EVERY 8 HOURS PRN
Status: CANCELLED | OUTPATIENT
Start: 2022-12-06

## 2022-12-06 RX ORDER — PROMETHAZINE HYDROCHLORIDE 6.25 MG/5ML
0.25 SYRUP ORAL EVERY 6 HOURS PRN
Status: CANCELLED | OUTPATIENT
Start: 2023-02-07

## 2022-12-06 RX ORDER — MIDAZOLAM HYDROCHLORIDE 1 MG/ML
2 INJECTION INTRAMUSCULAR; INTRAVENOUS ONCE
Status: COMPLETED | OUTPATIENT
Start: 2022-12-06 | End: 2022-12-06

## 2022-12-06 RX ORDER — FAMOTIDINE 20 MG/1
20 TABLET, FILM COATED ORAL 2 TIMES DAILY
Qty: 60 TABLET | Refills: 0 | Status: ON HOLD | OUTPATIENT
Start: 2022-12-06 | End: 2023-02-07

## 2022-12-06 RX ORDER — HEPARIN SODIUM,PORCINE 10 UNIT/ML
30 VIAL (ML) INTRAVENOUS PRN
Status: CANCELLED | OUTPATIENT
Start: 2022-12-06

## 2022-12-06 RX ORDER — ONDANSETRON 2 MG/ML
8 INJECTION INTRAMUSCULAR; INTRAVENOUS EVERY 8 HOURS PRN
Status: CANCELLED | OUTPATIENT
Start: 2023-01-24

## 2022-12-06 RX ORDER — ONDANSETRON 2 MG/ML
8 INJECTION INTRAMUSCULAR; INTRAVENOUS ONCE
Status: CANCELLED | OUTPATIENT
Start: 2023-01-24

## 2022-12-06 RX ORDER — PROMETHAZINE HYDROCHLORIDE 6.25 MG/5ML
0.25 SYRUP ORAL EVERY 6 HOURS PRN
Status: CANCELLED | OUTPATIENT
Start: 2023-01-20

## 2022-12-06 RX ORDER — PROMETHAZINE HYDROCHLORIDE 6.25 MG/5ML
0.25 SYRUP ORAL EVERY 6 HOURS PRN
Status: CANCELLED | OUTPATIENT
Start: 2023-01-04

## 2022-12-06 RX ORDER — PROMETHAZINE HYDROCHLORIDE 6.25 MG/5ML
0.25 SYRUP ORAL EVERY 6 HOURS PRN
Status: CANCELLED | OUTPATIENT
Start: 2023-01-24

## 2022-12-06 RX ORDER — ONDANSETRON 2 MG/ML
8 INJECTION INTRAMUSCULAR; INTRAVENOUS EVERY 8 HOURS PRN
Status: CANCELLED | OUTPATIENT
Start: 2023-01-04

## 2022-12-06 RX ORDER — ONDANSETRON 2 MG/ML
8 INJECTION INTRAMUSCULAR; INTRAVENOUS EVERY 8 HOURS PRN
Status: CANCELLED | OUTPATIENT
Start: 2023-02-07

## 2022-12-06 RX ORDER — DEXAMETHASONE 1.5 MG/1
3 TABLET ORAL EVERY 12 HOURS
Qty: 56 TABLET | Refills: 0 | Status: ON HOLD | OUTPATIENT
Start: 2022-12-06 | End: 2023-01-14

## 2022-12-06 RX ORDER — HEPARIN SODIUM (PORCINE) LOCK FLUSH IV SOLN 100 UNIT/ML 100 UNIT/ML
500 SOLUTION INTRAVENOUS PRN
Status: DISCONTINUED | OUTPATIENT
Start: 2022-12-06 | End: 2022-12-07 | Stop reason: HOSPADM

## 2022-12-06 RX ORDER — ONDANSETRON 2 MG/ML
8 INJECTION INTRAMUSCULAR; INTRAVENOUS EVERY 8 HOURS PRN
Status: CANCELLED | OUTPATIENT
Start: 2023-01-31

## 2022-12-06 RX ORDER — ONDANSETRON 2 MG/ML
8 INJECTION INTRAMUSCULAR; INTRAVENOUS EVERY 8 HOURS PRN
Status: CANCELLED | OUTPATIENT
Start: 2023-01-18

## 2022-12-06 RX ORDER — ONDANSETRON 2 MG/ML
8 INJECTION INTRAMUSCULAR; INTRAVENOUS EVERY 8 HOURS PRN
Status: CANCELLED | OUTPATIENT
Start: 2023-01-25

## 2022-12-06 RX ORDER — ONDANSETRON 2 MG/ML
8 INJECTION INTRAMUSCULAR; INTRAVENOUS EVERY 8 HOURS PRN
Status: CANCELLED | OUTPATIENT
Start: 2023-01-26

## 2022-12-06 RX ORDER — PROMETHAZINE HYDROCHLORIDE 6.25 MG/5ML
0.25 SYRUP ORAL EVERY 6 HOURS PRN
Status: CANCELLED | OUTPATIENT
Start: 2022-12-06

## 2022-12-06 RX ORDER — PROMETHAZINE HYDROCHLORIDE 6.25 MG/5ML
0.25 SYRUP ORAL EVERY 6 HOURS PRN
Status: CANCELLED | OUTPATIENT
Start: 2023-01-19

## 2022-12-06 RX ORDER — DIPHENHYDRAMINE HYDROCHLORIDE 50 MG/ML
50 INJECTION INTRAMUSCULAR; INTRAVENOUS ONCE
Status: CANCELLED | OUTPATIENT
Start: 2023-01-31 | End: 2023-01-18

## 2022-12-06 RX ORDER — SODIUM CHLORIDE 9 MG/ML
20 INJECTION, SOLUTION INTRAVENOUS PRN
Status: CANCELLED | OUTPATIENT
Start: 2023-01-04

## 2022-12-06 RX ORDER — DEXAMETHASONE 6 MG/1
6 TABLET ORAL EVERY 12 HOURS
Qty: 28 TABLET | Refills: 0 | Status: ON HOLD | OUTPATIENT
Start: 2022-12-06 | End: 2023-01-14

## 2022-12-06 RX ORDER — ONDANSETRON 2 MG/ML
8 INJECTION INTRAMUSCULAR; INTRAVENOUS ONCE
Status: CANCELLED | OUTPATIENT
Start: 2023-01-25

## 2022-12-06 RX ORDER — ONDANSETRON 2 MG/ML
8 INJECTION INTRAMUSCULAR; INTRAVENOUS EVERY 8 HOURS PRN
Status: CANCELLED | OUTPATIENT
Start: 2022-12-21

## 2022-12-06 RX ORDER — DIPHENHYDRAMINE HYDROCHLORIDE 50 MG/ML
50 INJECTION INTRAMUSCULAR; INTRAVENOUS PRN
Status: CANCELLED | OUTPATIENT
Start: 2023-01-31

## 2022-12-06 RX ORDER — ONDANSETRON 2 MG/ML
8 INJECTION INTRAMUSCULAR; INTRAVENOUS ONCE
Status: CANCELLED | OUTPATIENT
Start: 2023-01-31

## 2022-12-06 RX ORDER — ONDANSETRON 2 MG/ML
8 INJECTION INTRAMUSCULAR; INTRAVENOUS EVERY 8 HOURS PRN
Status: CANCELLED | OUTPATIENT
Start: 2023-01-27

## 2022-12-06 RX ORDER — DIPHENHYDRAMINE HYDROCHLORIDE 50 MG/ML
50 INJECTION INTRAMUSCULAR; INTRAVENOUS ONCE
Status: CANCELLED | OUTPATIENT
Start: 2022-12-10 | End: 2022-12-10

## 2022-12-06 RX ORDER — ONDANSETRON 2 MG/ML
8 INJECTION INTRAMUSCULAR; INTRAVENOUS EVERY 8 HOURS PRN
Status: CANCELLED | OUTPATIENT
Start: 2023-01-19

## 2022-12-06 RX ORDER — PROMETHAZINE HYDROCHLORIDE 6.25 MG/5ML
0.25 SYRUP ORAL EVERY 6 HOURS PRN
Status: CANCELLED | OUTPATIENT
Start: 2022-12-21

## 2022-12-06 RX ORDER — ONDANSETRON 2 MG/ML
8 INJECTION INTRAMUSCULAR; INTRAVENOUS ONCE
Status: CANCELLED | OUTPATIENT
Start: 2023-01-19

## 2022-12-06 RX ORDER — ONDANSETRON 2 MG/ML
8 INJECTION INTRAMUSCULAR; INTRAVENOUS ONCE
Status: CANCELLED | OUTPATIENT
Start: 2022-12-21

## 2022-12-06 RX ORDER — ONDANSETRON 2 MG/ML
8 INJECTION INTRAMUSCULAR; INTRAVENOUS ONCE
Status: COMPLETED | OUTPATIENT
Start: 2022-12-06 | End: 2022-12-06

## 2022-12-06 RX ORDER — PROMETHAZINE HYDROCHLORIDE 6.25 MG/5ML
0.25 SYRUP ORAL EVERY 6 HOURS PRN
Status: CANCELLED | OUTPATIENT
Start: 2022-12-14

## 2022-12-06 RX ORDER — PROMETHAZINE HYDROCHLORIDE 6.25 MG/5ML
0.25 SYRUP ORAL EVERY 6 HOURS PRN
Status: CANCELLED | OUTPATIENT
Start: 2023-01-31

## 2022-12-06 RX ORDER — DIPHENHYDRAMINE HYDROCHLORIDE 50 MG/ML
50 INJECTION INTRAMUSCULAR; INTRAVENOUS PRN
Status: CANCELLED | OUTPATIENT
Start: 2022-12-10

## 2022-12-06 RX ORDER — ONDANSETRON 2 MG/ML
8 INJECTION INTRAMUSCULAR; INTRAVENOUS ONCE
Status: CANCELLED | OUTPATIENT
Start: 2023-01-26

## 2022-12-06 RX ORDER — ONDANSETRON 2 MG/ML
8 INJECTION INTRAMUSCULAR; INTRAVENOUS ONCE
Status: CANCELLED | OUTPATIENT
Start: 2023-01-18

## 2022-12-06 RX ORDER — LIDOCAINE AND PRILOCAINE 25; 25 MG/G; MG/G
CREAM TOPICAL PRN
Status: DISCONTINUED | OUTPATIENT
Start: 2022-12-06 | End: 2022-12-07 | Stop reason: HOSPADM

## 2022-12-06 RX ORDER — PROMETHAZINE HYDROCHLORIDE 6.25 MG/5ML
0.25 SYRUP ORAL EVERY 6 HOURS PRN
Status: CANCELLED | OUTPATIENT
Start: 2023-01-25

## 2022-12-06 RX ORDER — ONDANSETRON 2 MG/ML
8 INJECTION INTRAMUSCULAR; INTRAVENOUS EVERY 8 HOURS PRN
Status: CANCELLED | OUTPATIENT
Start: 2023-01-20

## 2022-12-06 RX ORDER — ONDANSETRON 2 MG/ML
8 INJECTION INTRAMUSCULAR; INTRAVENOUS ONCE
Status: CANCELLED | OUTPATIENT
Start: 2022-12-14

## 2022-12-06 RX ORDER — ONDANSETRON 2 MG/ML
8 INJECTION INTRAMUSCULAR; INTRAVENOUS ONCE
Status: CANCELLED | OUTPATIENT
Start: 2023-01-04

## 2022-12-06 RX ORDER — PROMETHAZINE HYDROCHLORIDE 6.25 MG/5ML
0.25 SYRUP ORAL EVERY 6 HOURS PRN
Status: CANCELLED | OUTPATIENT
Start: 2023-01-18

## 2022-12-06 RX ORDER — ONDANSETRON 2 MG/ML
8 INJECTION INTRAMUSCULAR; INTRAVENOUS EVERY 8 HOURS PRN
Status: CANCELLED | OUTPATIENT
Start: 2022-12-14

## 2022-12-06 RX ADMIN — HEPARIN 500 UNITS: 100 SYRINGE at 11:28

## 2022-12-06 RX ADMIN — MIDAZOLAM 2 MG: 1 INJECTION INTRAMUSCULAR; INTRAVENOUS at 09:19

## 2022-12-06 RX ADMIN — VINCRISTINE SULFATE 2 MG: 1 INJECTION, SOLUTION INTRAVENOUS at 10:08

## 2022-12-06 RX ADMIN — ONDANSETRON 8 MG: 2 INJECTION INTRAMUSCULAR; INTRAVENOUS at 08:32

## 2022-12-06 RX ADMIN — METHOTREXATE 12 MG: 25 INJECTION INTRA-ARTERIAL; INTRAMUSCULAR; INTRATHECAL; INTRAVENOUS at 09:28

## 2022-12-06 RX ADMIN — LIDOCAINE AND PRILOCAINE 2.5 G: 25; 25 CREAM TOPICAL at 08:30

## 2022-12-06 RX ADMIN — DEXRAZOXANE FOR INJECTION 465 MG: 500 INJECTION, POWDER, LYOPHILIZED, FOR SOLUTION INTRAVENOUS at 10:23

## 2022-12-06 RX ADMIN — DOXORUBICIN HYDROCHLORIDE 46.5 MG: 2 INJECTION, SOLUTION INTRAVENOUS at 10:35

## 2022-12-06 ASSESSMENT — FIBROSIS 4 INDEX: FIB4 SCORE: 0.43

## 2022-12-06 NOTE — H&P
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, FISH positive for BCR-ABL1 translocation              - Enrolled on [facility] [id number]              - Initially enrolled on [facility] [id number] - but 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                            - Enrolled on [facility] [id number] and began study 6/13/2022              - Started imatinib therapy 6/3/2022 (split dosing of imatinib 400 mg PO QAM and 200 mg PO QPM) - continued at Day 15 of Induction 1A with imatinib 600 mg PO daily - remains 100% compliant with imatinib   - No changes needed in imatinib dosing to date              - End of Induction IA and IB MRD negative                         - WBC 1.9, Hgb 10.9, platelets 324  - ,   - Creatinine 0.68  - AST 21, ALT 35, total bilirubin 0.3                  - No acute dose-limiting toxicities.  , platelets 324 and in meeting with criteria to proceed with start of Delayed  Intensification.  No adjustments needed to imatinib dosing.  Confirmed 100% compliance with imatinib 400 mg in the AM and 250 mg in the PM.  Confirmed last mercaptopurine dose 11/27/2022.                  - Will admit for Day 43 of Interim Maintenance however given that creatinine is > 125 at baseline,% we will begin with overnight prehydration and begin methotrexate AM                SHORTY GOULD Arm B, Delayed Intensification, Day 1:       ** Methotrexate 12 mg IT x1 dose (see separate procedure note)  ** Vincristine 2 mg IV x 1 dose              ** Doxorubicin 46.5 mg IV on Days 1 (TODAY), 8 and 15   ** Dexrazoxane 465 mg IV on Days 1 (TODAY), 8 and 15 immediately prior to doxorubicin              ** PEG-Aspargase 4650 int units IV on Day 4    ** Continue imatinib 400 mg QAM and 250 mg QPM                 2) Chemotherapy Related Pancytopenia:  - WBC 1.9, Hgb 10.9, platelets 324  - ,   - No transfusion indicated     3) Sixth Cranial Nerve Palsy (IMPROVED/RESOLVED):             - Followed by Dr. Carranza     4) At Risk of Opportunistic Lung Infection:             - Bactrim DS PO BID sat and Sun     5) Anxiety (IMPROVED GREATLY):     6) Social:             - Continue with support             - Continue school as tolerated     7) Access:               - R Port-A-Cath in place      8) Research Participant:           [facility] - Source Data         Diagnosis: Ph+ Precursor B-Cell Acute Lymphoblastic Leukemia     Disease Status: EOI1A MRD NEGATIVE, EOI1B RD NEGATIVE, CNS3c, testicular negative, HSV1 IgG POSITIVE, CMV IgG NEGATIVE, VARICELLA IgG POSITIVE     Active Studies: [id number], [id number]                                                                                                      Inactive Studies: [id number]                                                                                                                                                Optional Studies: None              Protocol: International Phase 3 trial in [place] 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      Treatment Plan: [id number](OS), AR-Arm B, Delayed Intensification, Day 1     Height: 1.650 m      Weight: 75.5 kg       BSA: 1.86 m²   (Start of Delayed Intensification 12/6/2022)                                                                                                                                           Performance Status: Karnofsky 100, ECOG  0 (updated 12/6/2022)     Treatment Plan Medications:  (verified 100% compliance)     Currently taking imatinib 400 mg PO QAM and 250 mg QPM  ** Current BSA 1.86 m² = NO WEIGHT BASED CHANGE IN IMATINIB DOSING **  ** Will obtain repeat BSA in 12 weeks **    Will begin Dexamethasone 9 mg PO BID on Days 1-7 and 15-22     Evaluations / Study Labs:  (12/6/2022)     WBC 1.9, Hgb 10.9, platelets 324  ANC 1070,   Creatinine 0.68  AST 21, ALT 35, total bilirubin 0.3, direct bilirubin <0.2    CSF cell count and cytology PENDING     Therapy Given: (12/6/2022)     Methotrexate 12 mg IT   Vincristine 2 mg IV   Doxorubicin 46.5 mg IV  Dexrazoxane 465 mg IV         Disposition: Return to clinic for Day 4 PEG-Aspargase    Pepe Faye MD  Pediatric Hematology / Oncology  [facility]  Cell.  [phone]  Office. [phone]

## 2022-12-06 NOTE — PROGRESS NOTES
"Pharmacy Chemotherapy Calculations Note:    Dx: B-ALL, PH+    Cycle: Delayed Intensification Day 1  On Study - [id number] arm B COG ID number: [id number]  Previous treatment: IM Day 43 on 11/7/22           /69   Pulse 92   Temp 37.1 °C (98.7 °F) (Temporal)   Resp 18   Ht 1.65 m (5' 4.96\")   Wt 75.5 kg (166 lb 7.2 oz)   SpO2 97%   BMI 27.73 kg/m²  Body surface area is 1.86 meters squared.  Treatment plan 165 cm 75.5 kg 1.86 m²       Labs 12/6/2022 are within parameters for tx according to treatment plan     IT methotrexate 12 mg fixed dose for age              <10% difference, okay to treat with final dose = 12 mg IT     Vincristine 1.5 mg/m² (max 2 mg) x 1.86 m² = >2 mg (2 mg maximum dose)              okay to treat with max dose = 2 mg IV     Doxorubicin 25 mg/m² x 1.86 m² = 46.5 mg              <10% difference, okay to treat with final dose = 46.5 mg IV    Dexrazoxane 250 mg/m² x 1.86 m² = 465 mg              <10% difference, okay to treat with final dose = 465 mg IV      Cherrie Decker, PharmD, BCOP, BCPS      "

## 2022-12-06 NOTE — PROCEDURES
Pediatric Oncology Lumbar Puncture  Procedure Note      Patient Name:  Tomas Jean-Baptiste  : 2000   MRN: [id number]    Service Location:  [facility]  Date of Service: 2022  Time: 9:24 AM    Procedure Performed By: Pepe Faye M.D.    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 male diagnosed with [place] Chromosome Pre-B Acute Lymphoblastic Leukemia (C91.01) having achieved remission.  He presents today for scheduled chemotherapy, start of Delayed Intensification, Day 1 and scheduled lumbar puncture with intrathecal chemotherapy.  Prior to the procedure, the risks and benefits were discussed with the patient .  Consent for the procedure was signed by JULY and placed in the his chart.  All pertinent labs and history were reviewed and a complete History and Physical Examination were performed and placed in the medical record.  Tomas Roy was then taken to the procedure room where the procedure was performed.  All necessary safety equipment per ASA guidelines were available.  A time-out was performed and the patient identified by name,  and medical record number.  Gloves and mask were worn during the entire procedure.  Tomas Roy was prepped and draped in the usual sterile fashion with povoiodine.  he was positioned in the left  decubitus position and all landmarks including superior posterior iliac crest, umbilicus and vertebral bodies were identified by palpation.  A 3.5 in, 22 gauge spinal needle was introduced into the L3-L4 spinal interspace.  6 ml of clear fluid were obtained and sent for cell count and cytology.  Methotrexate 12 mg in 6 mL of NS was verified with the nurse bedside and then then administered into the spinal fluid. Tomas Roy tolerated the procedure without complication or bleeding.  he and his parents were instructed that he lie flat for 1 hour following the procedure.    Results:    WBC < 1 and RBC < 1    Pepe Faye MD  Pediatric Hematology / Oncology  [facility]  Cell.  [phone]

## 2022-12-06 NOTE — PROGRESS NOTES
"Pharmacy Chemotherapy Verification Note:    Dx: Ph+ B-ALL        Protocol: Delayed Intensification Part 1 per [id number] (On Study Arm B, ID number: [id number])         Allergies:  Amoxicillin     /69   Pulse 92   Temp 37.1 °C (98.7 °F) (Temporal)   Resp 18   Ht 1.65 m (5' 4.96\")   Wt 75.5 kg (166 lb 7.2 oz)   SpO2 97%   BMI 27.73 kg/m²  Body surface area is 1.86 meters squared.    Labs from 12/6/22 reviewed - all within treatment parameters.     Drug Order   (Drug name, dose, route, IV Fluid & volume, frequency, number of doses) Cycle: Delayed Intensification Day 1      Previous treatment: IM Day 43 on 11/15/22     Medication = IT methotrexate   Base Dose = 12 mg fixed dose for age  Calc Dose: n/a  Final Dose = 12 mg  Route = intraTHECAL  Fluid & Volume = NS 6 mL  Admin Duration = IT inj by MD only          <10% difference, okay to treat with final dose   Medication = vincristine  Base Dose = 1.5 mg/m2 (max 2 mg)  Calc Dose: Base Dose x 1.86 m2 = 2.79 mg  Final Dose = 2 mg  Route = IV  Fluid & Volume = NS 25 mL  Admin Duration = Over 5-10 mins          okay to treat with final max dose   Medication = dexrazoxane  Base Dose = 250 mg/m2  Calc Dose:Base Dose x 1.86 m2 = 465 mg  Final Dose = 465 mg  Route = IV  Fluid & Volume =  mL  Admin Duration = Over 5-15 mins   Give immediately prior to doxorubicin       <10% difference, okay to treat with final dose   Medication = doxorubicin  Base Dose = 25 mg/m2  Calc Dose: Base Dose x 1.86 m2 = 46.5 mg  Final Dose = 46.5 mg  Route = IV  Fluid & Volume = 5 mg/mL = 9.3 mL in bag  Admin Duration = Over 15 mins          <10% difference, okay to treat with final dose     By my signature below, I confirm this process was performed independently with the BSA and all final chemotherapy dosing calculations congruent. I have reviewed the above chemotherapy order and that my calculation of the final dose and BSA (when applicable) corroborate those calculations of the order " entry pharmacist.     Judy Bryant, PharmD, BCOP

## 2022-12-06 NOTE — PROGRESS NOTES
Pt to [facility] for, LP, lab draw, doctors office visit, and chemotherapy administration.      Afebrile.  VSS.  Awake and alert in no acute distress.      Port accessed using a 22g 3/4 inch trevino needle with 1 attempt.  Labs drawn from the port without difficulty.   Pt tolerated well.       Visit with Dr. Faye completed.     Pt premedicated with Versed prior to procedure.     Verified patency prior to procedure. Procedure performed by Dr. Faye.      Chemotherapy dosage calculated independently by Tammie Covington RN and Dayna Damian RN and compared to road map for protocol [id number].  Calculations within 10% of written order.  Lab results reviewed.      Premedications and chemo given as ordered, see MAR.  Blood return verified prior to, during, and after chemotherapy infusion.  See Chemotherapy flowsheet.  PT tolerated well.  No side effects or complications noted.  Port flushed per orders (see MAR) and de-accessed after completion. PT home with mother.  Will return for next visit on 12/9/22.

## 2022-12-09 ENCOUNTER — HOSPITAL ENCOUNTER (OUTPATIENT)
Dept: INFUSION CENTER | Facility: MEDICAL CENTER | Age: 22
End: 2022-12-09
Attending: PEDIATRICS
Payer: COMMERCIAL

## 2022-12-09 VITALS
TEMPERATURE: 99.2 F | HEIGHT: 66 IN | RESPIRATION RATE: 20 BRPM | HEART RATE: 91 BPM | DIASTOLIC BLOOD PRESSURE: 69 MMHG | BODY MASS INDEX: 25.97 KG/M2 | SYSTOLIC BLOOD PRESSURE: 121 MMHG | OXYGEN SATURATION: 99 % | WEIGHT: 161.6 LBS

## 2022-12-09 DIAGNOSIS — C91.Z0 B LYMPHOBLASTIC LEUKEMIA WITH T(9;22)(Q34;Q11.2);BCR-ABL1 (HCC): ICD-10-CM

## 2022-12-09 DIAGNOSIS — Z51.11 ENCOUNTER FOR CHEMOTHERAPY MANAGEMENT: ICD-10-CM

## 2022-12-09 DIAGNOSIS — C91.00 PHILADELPHIA CHROMOSOME POSITIVE ACUTE LYMPHOBLASTIC LEUKEMIA (HCC): ICD-10-CM

## 2022-12-09 LAB — GLUCOSE BLD STRIP.AUTO-MCNC: 161 MG/DL (ref 65–99)

## 2022-12-09 PROCEDURE — 82962 GLUCOSE BLOOD TEST: CPT

## 2022-12-09 PROCEDURE — 96413 CHEMO IV INFUSION 1 HR: CPT

## 2022-12-09 PROCEDURE — 306780 HCHG STAT FOR TRANSFUSION PER CASE

## 2022-12-09 PROCEDURE — 700105 HCHG RX REV CODE 258: Performed by: PEDIATRICS

## 2022-12-09 PROCEDURE — 96375 TX/PRO/DX INJ NEW DRUG ADDON: CPT

## 2022-12-09 PROCEDURE — 700111 HCHG RX REV CODE 636 W/ 250 OVERRIDE (IP): Performed by: PEDIATRICS

## 2022-12-09 PROCEDURE — A4212 NON CORING NEEDLE OR STYLET: HCPCS

## 2022-12-09 PROCEDURE — 96415 CHEMO IV INFUSION ADDL HR: CPT

## 2022-12-09 PROCEDURE — 99214 OFFICE O/P EST MOD 30 MIN: CPT | Performed by: PEDIATRICS

## 2022-12-09 RX ORDER — HEPARIN SODIUM (PORCINE) LOCK FLUSH IV SOLN 100 UNIT/ML 100 UNIT/ML
500 SOLUTION INTRAVENOUS PRN
Status: CANCELLED | OUTPATIENT
Start: 2022-12-09

## 2022-12-09 RX ORDER — LIDOCAINE AND PRILOCAINE 25; 25 MG/G; MG/G
CREAM TOPICAL PRN
Status: DISCONTINUED | OUTPATIENT
Start: 2022-12-09 | End: 2022-12-10 | Stop reason: HOSPADM

## 2022-12-09 RX ORDER — 0.9 % SODIUM CHLORIDE 0.9 %
20 VIAL (ML) INJECTION PRN
Status: CANCELLED | OUTPATIENT
Start: 2022-12-09

## 2022-12-09 RX ORDER — DIPHENHYDRAMINE HYDROCHLORIDE 50 MG/ML
50 INJECTION INTRAMUSCULAR; INTRAVENOUS PRN
Status: DISCONTINUED | OUTPATIENT
Start: 2022-12-09 | End: 2022-12-10 | Stop reason: HOSPADM

## 2022-12-09 RX ORDER — HEPARIN SODIUM,PORCINE 10 UNIT/ML
30 VIAL (ML) INTRAVENOUS PRN
Status: CANCELLED | OUTPATIENT
Start: 2022-12-09

## 2022-12-09 RX ORDER — EPINEPHRINE 1 MG/ML(1)
0.5 AMPUL (ML) INJECTION PRN
Status: DISCONTINUED | OUTPATIENT
Start: 2022-12-09 | End: 2022-12-10 | Stop reason: HOSPADM

## 2022-12-09 RX ORDER — HEPARIN SODIUM (PORCINE) LOCK FLUSH IV SOLN 100 UNIT/ML 100 UNIT/ML
500 SOLUTION INTRAVENOUS PRN
Status: DISCONTINUED | OUTPATIENT
Start: 2022-12-09 | End: 2022-12-10 | Stop reason: HOSPADM

## 2022-12-09 RX ORDER — DIPHENHYDRAMINE HYDROCHLORIDE 50 MG/ML
50 INJECTION INTRAMUSCULAR; INTRAVENOUS ONCE
Status: COMPLETED | OUTPATIENT
Start: 2022-12-09 | End: 2022-12-09

## 2022-12-09 RX ADMIN — PEGASPARGASE 4650 UNITS: 750 INJECTION, SOLUTION INTRAMUSCULAR; INTRAVENOUS at 11:34

## 2022-12-09 RX ADMIN — DIPHENHYDRAMINE HYDROCHLORIDE 50 MG: 50 INJECTION INTRAMUSCULAR; INTRAVENOUS at 10:36

## 2022-12-09 RX ADMIN — HEPARIN 500 UNITS: 100 SYRINGE at 14:59

## 2022-12-09 ASSESSMENT — FIBROSIS 4 INDEX: FIB4 SCORE: 0.23

## 2022-12-09 NOTE — PROGRESS NOTES
"Pharmacy Chemotherapy Calculations Note:    Dx: B-ALL, PH+    Cycle: Delayed Intensification Day 4  On Study - [id number] arm B COG ID number: [id number]  Previous treatment: Delayed Intensification Day 1 on 12/7/22           Ht 1.666 m (5' 5.59\")   Wt 73.3 kg (161 lb 9.6 oz)   BMI 26.41 kg/m²  Body surface area is 1.84 meters squared.  Treatment plan 165 cm 75.5 kg 1.86 m²       No parameters Labs 12/9/2022 are within parameters for tx according to treatment plan     Pegaspargase (ONCASPAR) 2,500 itn'l units/m² x 1.84 m² = 4,600 units              <10% difference, OK to treat with final dose = 4650 units IV over 2 hours       Cherrie Decker, PharmD, BCOP, BCPS      "

## 2022-12-09 NOTE — PROGRESS NOTES
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 12/06/2022 <1     Lymphs 12/06/2022 95     Mononuclear Cells - CSF 12/06/2022 5     CSF Tube Number 12/06/2022 2     Cytology Reg 12/06/2022 See Path Report     Indirect Bilirubin 12/06/2022 see below     GFR (CKD-EPI) 12/06/2022 135     Peripheral Smear Review 12/06/2022 see below     Plt Estimation 12/06/2022 Normal     RBC Morphology 12/06/2022 Present     Comments-Diff 12/06/2022 see below      No new labs today    Physical Exam:    Constitutional: Well-developed, well-nourished, and in no distress.  Very well-appearing.  HENT: Normocephalic and atraumatic. No nasal congestion or rhinorrhea. Oropharynx is clear and moist. No oral ulcerations or sores.    Eyes: Conjunctivae are normal. Pupils are equal, round.  EOMI.  Nonicteric.  Neck: Normal range of motion of neck, no adenopathy.    Cardiovascular: Normal rate, regular rhythm and normal heart sounds.  No murmur heard. DP/radial pulses 2+, cap refill < 2 sec.  Pulmonary/Chest: Effort  normal and breath sounds normal. No respiratory distress. Symmetric expansion.  No crackles or wheezes.  Abdomen: Soft. Bowel sounds are normal. No distension and no mass. There is no hepatosplenomegaly.    Genitourinary:  Deferred.  Musculoskeletal: Normal range of motion of lower and upper extremities bilaterally.   Neurological: Alert and oriented to person and place. Exhibits normal muscle tone bilaterally in upper and lower extremities. Gait normal. Coordination normal.    Skin: Skin is warm, dry and pink.  No rash or evidence of skin infection.  No pallor.   Psychiatric: Mood and affect normal for age.    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, in MRD Remission:              - 2-3 weeks of symptoms              - Presenting WBC > 440 k/uL, hyperleukocytosis              - Start of Hydroxyurea (1500 mg PO Q8) 2320 on 5/27/2022  - discontinued after only 55 hours              - No steroid pretreatment              - 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              - Testicular status NEGATIVE                   - Flow cytometry of both 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, FISH positive for BCR-ABL1 translocation              - [place] [facility] [id number]              - [place] [facility] [id number] - but 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                            - [place] [facility] [id number] and began study 6/13/2022              - Started imatinib therapy 6/3/2022 (split dosing of imatinib 400 mg PO QAM and 200 mg PO QPM) - continued at Day 15 of Induction 1A with imatinib 600 mg PO daily - remains 100%  compliant with imatinib              - No changes needed in imatinib dosing to date              - End of Induction IA and IB MRD negative                         - WBC 1.9, Hgb 10.9, platelets 324 at the start of Delayed Intensification  - ,  at start of Delayed Intensification  - Creatinine 0.68 at start of Delayed Intensification  - AST 21, ALT 35, total bilirubin 0.3 at start of                - Verified 100% adherence with dexamethasone 9 mg PO BID and imatinib 400 mg PO QAM and 250 mg QPM                [id number], AR Arm B, Delayed Intensification, Day 4:               ** Methotrexate 12 mg IT x1 dose on Day 1 (COMPLETE)  ** Vincristine 2 mg IV x 1 dose on Days 1 (COMPLETE), 8 and 15              ** Doxorubicin 46.5 mg IV on Days 1 (COMPLETE), 8 and 15              ** Dexrazoxane 465 mg IV on Days 1 (COMPLETE), 8 and 15 immediately prior to doxorubicin              ** PEG-Aspargase 4650 int units IV on Day 4 (TODAY)     ** Continue imatinib 400 mg QAM and 250 mg QPM                 2) Chemotherapy Related Pancytopenia:  - WBC 1.9, Hgb 10.9, platelets 324 at start of Delayed Intensification  - ,  at start of Delayed Intensification  - No transfusion indicated     3) Sixth Cranial Nerve Palsy (IMPROVED/RESOLVED):             - Followed by Dr. Carranza     4) At Risk of Opportunistic Lung Infection:             - Bactrim DS PO BID sat and Sun     5) Anxiety (IMPROVED GREATLY):     6) Social:             - Continue with support             - Continue school as tolerated     7) Access:               - R Port-A-Cath in place      8) Research Participant:           [facility] - Source Data         Diagnosis: Ph+ Precursor B-Cell Acute Lymphoblastic Leukemia     Disease Status: EOI1A MRD NEGATIVE, EOI1B RD NEGATIVE, CNS3c, testicular negative, HSV1 IgG POSITIVE, CMV IgG NEGATIVE, VARICELLA IgG POSITIVE     Active Studies: [id number], [id number]                                                                                                       Inactive Studies: [id number]                                                                                                                                                Optional Studies: None             Protocol: International Phase 3 trial in [place] 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      Treatment Plan: [id number](OS), AR-Arm B, Delayed Intensification, Day 4     Height: 1.650 m      Weight: 75.5 kg       BSA: 1.86 m²   (Start of Delayed Intensification 12/6/2022)                                                                                                                                           Performance Status: Karnofsky 100, ECOG  0 (updated 12/6/2022)     Treatment Plan Medications:  (verified 100% compliance)     Currently taking imatinib 400 mg PO QAM and 250 mg QPM  ** Current BSA 1.86 m² = NO WEIGHT BASED CHANGE IN IMATINIB DOSING **  ** Will obtain repeat BSA in 12 weeks **     Continue dexamethasone 9 mg PO BID on Days 1-7 and 15-22      Evaluations / Study Labs:  (12/6/2022)     WBC 1.9, Hgb 10.9, platelets 324  ANC 1070,   Creatinine 0.68  AST 21, ALT 35, total bilirubin 0.3, direct bilirubin <0.2     CSF cell count and cytology with WBC <1 and RBC < 1     Therapy Given: (12/9/2022)     PEG-Aspargase (Oncospar) 4650 Intl Units IV x 1         Disposition: Return to clinic 12/13/2022 for Day 8     Pepe Faye MD  Pediatric Hematology / Oncology  [facility]  Cell.  [phone]  Office. [phone]

## 2022-12-09 NOTE — PROGRESS NOTES
"Pharmacy Chemotherapy Verification    Dx: Ph+ B-ALL        Protocol: Delayed Intensification Part 1 per [id number] (On Study Arm B, ID number: [id number])         Allergies:  Amoxicillin     Ht 1.666 m (5' 5.59\")   Wt 73.3 kg (161 lb 9.6 oz)   BMI 26.41 kg/m²   Body surface area is 1.84 meters squared.  Treatment plan 165 cm 75.5 kg 1.86 m²     Labs 12/6/22   Hgb 10.9 Plt 324k  SCr 0.68  AST/ALT/AP = 21/35/63 Tbili = 0.3     Drug Order   (Drug name, dose, route, IV Fluid & volume, frequency, number of doses) Cycle: Delayed Intensification Day 4    Previous treatment: DI Day 1 12/6/22     Medication = pegaspargase  Base Dose = 2500 international units/m2  Calc Dose: Base dose x 1.84 m2 = 4600 units  Final Dose = 4650 units  Route = IV  Fluid & Volume =  mL  Admin Duration = Over 2 hours    Day 4       <10% difference, okay to treat with final dose     By my signature below, I confirm this process was performed independently with the BSA and all final chemotherapy dosing calculations congruent. I have reviewed the above chemotherapy order and that my calculation of the final dose and BSA (when applicable) corroborate those calculations of the  pharmacist.     Ibeth Paulino, PharmD, BCOP    "

## 2022-12-09 NOTE — PROGRESS NOTES
Pt to [facility] for doctors office visit, and chemotherapy administration.      Afebrile.  VSS.  Awake and alert in no acute distress.      Port accessed using a 22g 3/4 inch trevino needle with 1 attempt.  Pt tolerated well.      Chemotherapy dosage calculated independently by Mary Saravia RN and Huong Foster RN and compared to road map for protocol [id number].  Calculations within 10% of written order.  Lab results reviewed.      Premedications and chemo given as ordered, see MAR.  Blood return verified prior to and after chemotherapy infusion.  See Chemotherapy flowsheet.  Pt monitored for one hours post infusion. PT tolerated well.  No side effects or complications noted.  Port flushed per orders (see MAR) and de-accessed after completion. PT home with Mother.  Darling escorted patient to Mother in [facility]. Will return for next visit on 12/13/22.

## 2022-12-12 NOTE — PROGRESS NOTES
"Pharmacy Chemotherapy Calculations Note:    Dx: B-ALL, PH+    Cycle: Delayed Intensification Day 8  On Study - [id number] arm B COG ID number: [id number]  Previous treatment: D4 = 12/9             /80   Pulse 96   Temp 36.1 °C (97 °F) (Temporal)   Resp 20   Ht 1.66 m (5' 5.35\")   Wt 70.5 kg (155 lb 6.8 oz)   SpO2 96%   BMI 25.58 kg/m²  Body surface area is 1.8 meters squared.  Treatment plan 165 cm 75.5 kg 1.86 m²   DBili = 0.3     vinCRIStine 1.5 mg/m² x 1.8 m² = 2 mg (dose cap)              <10% difference, OK to treat with final dose = 2 mg     DOXOrubicin 25 mg/m² x 1.8 m² = 45 mg              <10% difference, OK to treat with final dose = 46.5 mg     Dexrazoxane 250 mg/m² x 1.8 m² = 450 mg              <10% difference, OK to treat with final dose = 465 mg      Ubaldo Rodriguez, PharmD, BCPS        "

## 2022-12-13 ENCOUNTER — HOSPITAL ENCOUNTER (OUTPATIENT)
Dept: INFUSION CENTER | Facility: MEDICAL CENTER | Age: 22
End: 2022-12-13
Attending: PEDIATRICS
Payer: COMMERCIAL

## 2022-12-13 VITALS
WEIGHT: 155.42 LBS | SYSTOLIC BLOOD PRESSURE: 125 MMHG | TEMPERATURE: 97 F | BODY MASS INDEX: 25.9 KG/M2 | RESPIRATION RATE: 20 BRPM | DIASTOLIC BLOOD PRESSURE: 80 MMHG | OXYGEN SATURATION: 96 % | HEART RATE: 96 BPM | HEIGHT: 65 IN

## 2022-12-13 DIAGNOSIS — Z51.11 ENCOUNTER FOR CHEMOTHERAPY MANAGEMENT: ICD-10-CM

## 2022-12-13 DIAGNOSIS — C91.Z0 B LYMPHOBLASTIC LEUKEMIA WITH T(9;22)(Q34;Q11.2);BCR-ABL1 (HCC): ICD-10-CM

## 2022-12-13 DIAGNOSIS — C91.00 PHILADELPHIA CHROMOSOME POSITIVE ACUTE LYMPHOBLASTIC LEUKEMIA (HCC): ICD-10-CM

## 2022-12-13 LAB
ALBUMIN SERPL BCP-MCNC: 4.4 G/DL (ref 3.2–4.9)
ALBUMIN/GLOB SERPL: 2.9 G/DL
ALP SERPL-CCNC: 70 U/L (ref 30–99)
ALT SERPL-CCNC: 84 U/L (ref 2–50)
ANION GAP SERPL CALC-SCNC: 14 MMOL/L (ref 7–16)
AST SERPL-CCNC: 48 U/L (ref 12–45)
BASOPHILS # BLD AUTO: 0.5 % (ref 0–1.8)
BASOPHILS # BLD: 0.03 K/UL (ref 0–0.12)
BILIRUB CONJ SERPL-MCNC: 0.3 MG/DL (ref 0.1–0.5)
BILIRUB INDIRECT SERPL-MCNC: 0.9 MG/DL (ref 0–1)
BILIRUB SERPL-MCNC: 1.2 MG/DL (ref 0.1–1.5)
BUN SERPL-MCNC: 25 MG/DL (ref 8–22)
CALCIUM ALBUM COR SERPL-MCNC: 8.4 MG/DL (ref 8.5–10.5)
CALCIUM SERPL-MCNC: 8.7 MG/DL (ref 8.5–10.5)
CHLORIDE SERPL-SCNC: 103 MMOL/L (ref 96–112)
CO2 SERPL-SCNC: 21 MMOL/L (ref 20–33)
CREAT SERPL-MCNC: 0.69 MG/DL (ref 0.5–1.4)
EOSINOPHIL # BLD AUTO: 0 K/UL (ref 0–0.51)
EOSINOPHIL NFR BLD: 0 % (ref 0–6.9)
ERYTHROCYTE [DISTWIDTH] IN BLOOD BY AUTOMATED COUNT: 61.8 FL (ref 35.9–50)
GFR SERPLBLD CREATININE-BSD FMLA CKD-EPI: 135 ML/MIN/1.73 M 2
GLOBULIN SER CALC-MCNC: 1.5 G/DL (ref 1.9–3.5)
GLUCOSE SERPL-MCNC: 141 MG/DL (ref 65–99)
HCT VFR BLD AUTO: 36.4 % (ref 42–52)
HGB BLD-MCNC: 12.2 G/DL (ref 14–18)
IMM GRANULOCYTES # BLD AUTO: 0.11 K/UL (ref 0–0.11)
IMM GRANULOCYTES NFR BLD AUTO: 1.8 % (ref 0–0.9)
LYMPHOCYTES # BLD AUTO: 0.13 K/UL (ref 1–4.8)
LYMPHOCYTES NFR BLD: 2.1 % (ref 22–41)
MCH RBC QN AUTO: 34.3 PG (ref 27–33)
MCHC RBC AUTO-ENTMCNC: 33.5 G/DL (ref 33.7–35.3)
MCV RBC AUTO: 102.2 FL (ref 81.4–97.8)
MONOCYTES # BLD AUTO: 0.08 K/UL (ref 0–0.85)
MONOCYTES NFR BLD AUTO: 1.3 % (ref 0–13.4)
NEUTROPHILS # BLD AUTO: 5.86 K/UL (ref 1.82–7.42)
NEUTROPHILS NFR BLD: 94.3 % (ref 44–72)
NRBC # BLD AUTO: 0 K/UL
NRBC BLD-RTO: 0 /100 WBC
PLATELET # BLD AUTO: 230 K/UL (ref 164–446)
PMV BLD AUTO: 10 FL (ref 9–12.9)
POTASSIUM SERPL-SCNC: 3.6 MMOL/L (ref 3.6–5.5)
PROT SERPL-MCNC: 5.9 G/DL (ref 6–8.2)
RBC # BLD AUTO: 3.56 M/UL (ref 4.7–6.1)
SODIUM SERPL-SCNC: 138 MMOL/L (ref 135–145)
WBC # BLD AUTO: 6.2 K/UL (ref 4.8–10.8)

## 2022-12-13 PROCEDURE — RXMED WILLOW AMBULATORY MEDICATION CHARGE: Performed by: PEDIATRICS

## 2022-12-13 PROCEDURE — 700105 HCHG RX REV CODE 258: Performed by: PEDIATRICS

## 2022-12-13 PROCEDURE — 96375 TX/PRO/DX INJ NEW DRUG ADDON: CPT

## 2022-12-13 PROCEDURE — 96411 CHEMO IV PUSH ADDL DRUG: CPT

## 2022-12-13 PROCEDURE — 80053 COMPREHEN METABOLIC PANEL: CPT

## 2022-12-13 PROCEDURE — 36591 DRAW BLOOD OFF VENOUS DEVICE: CPT

## 2022-12-13 PROCEDURE — 700111 HCHG RX REV CODE 636 W/ 250 OVERRIDE (IP): Performed by: PEDIATRICS

## 2022-12-13 PROCEDURE — 85025 COMPLETE CBC W/AUTO DIFF WBC: CPT

## 2022-12-13 PROCEDURE — 99214 OFFICE O/P EST MOD 30 MIN: CPT | Performed by: PEDIATRICS

## 2022-12-13 PROCEDURE — 96409 CHEMO IV PUSH SNGL DRUG: CPT

## 2022-12-13 PROCEDURE — 82248 BILIRUBIN DIRECT: CPT

## 2022-12-13 PROCEDURE — A4212 NON CORING NEEDLE OR STYLET: HCPCS

## 2022-12-13 RX ORDER — ONDANSETRON 2 MG/ML
8 INJECTION INTRAMUSCULAR; INTRAVENOUS ONCE
Status: COMPLETED | OUTPATIENT
Start: 2022-12-13 | End: 2022-12-13

## 2022-12-13 RX ORDER — 0.9 % SODIUM CHLORIDE 0.9 %
20 VIAL (ML) INJECTION PRN
Status: CANCELLED | OUTPATIENT
Start: 2022-12-13

## 2022-12-13 RX ORDER — HEPARIN SODIUM,PORCINE 10 UNIT/ML
30 VIAL (ML) INTRAVENOUS PRN
Status: CANCELLED | OUTPATIENT
Start: 2022-12-13

## 2022-12-13 RX ORDER — HEPARIN SODIUM (PORCINE) LOCK FLUSH IV SOLN 100 UNIT/ML 100 UNIT/ML
500 SOLUTION INTRAVENOUS PRN
Status: DISCONTINUED | OUTPATIENT
Start: 2022-12-13 | End: 2022-12-14 | Stop reason: HOSPADM

## 2022-12-13 RX ORDER — HEPARIN SODIUM (PORCINE) LOCK FLUSH IV SOLN 100 UNIT/ML 100 UNIT/ML
500 SOLUTION INTRAVENOUS PRN
Status: CANCELLED | OUTPATIENT
Start: 2022-12-13

## 2022-12-13 RX ADMIN — HEPARIN 500 UNITS: 100 SYRINGE at 15:39

## 2022-12-13 RX ADMIN — DOXORUBICIN HYDROCHLORIDE 46.5 MG: 2 INJECTION, SOLUTION INTRAVENOUS at 15:12

## 2022-12-13 RX ADMIN — ONDANSETRON 8 MG: 2 INJECTION INTRAMUSCULAR; INTRAVENOUS at 14:53

## 2022-12-13 RX ADMIN — DEXRAZOXANE FOR INJECTION 465 MG: 500 INJECTION, POWDER, LYOPHILIZED, FOR SOLUTION INTRAVENOUS at 15:05

## 2022-12-13 RX ADMIN — VINCRISTINE SULFATE 2 MG: 1 INJECTION, SOLUTION INTRAVENOUS at 15:00

## 2022-12-13 ASSESSMENT — FIBROSIS 4 INDEX: FIB4 SCORE: 0.23

## 2022-12-13 NOTE — PROGRESS NOTES
"Pharmacy Chemotherapy Verification Note:    Dx: Ph+ B-ALL        Protocol: Delayed Intensification Part 1 per [id number] (On Study Arm B, ID number: [id number])         Allergies:  Amoxicillin     /80   Pulse 96   Temp 36.1 °C (97 °F) (Temporal)   Resp 20   Ht 1.66 m (5' 5.35\")   Wt 70.5 kg (155 lb 6.8 oz)   SpO2 96%   BMI 25.58 kg/m²  Body surface area is 1.8 meters squared.    Labs from 12/13/22  reviewed - all within treatment parameters.      Drug Order   (Drug name, dose, route, IV Fluid & volume, frequency, number of doses) Cycle: Delayed Intensification Day 8     Previous treatment: DI D4 12/9/22     Medication = vincristine  Base Dose = 1.5 mg/m2 (max 2 mg)  Calc Dose: Base Dose x 1.8m2 = >2mg  Final Dose = 2 mg  Route = IV  Fluid & Volume = NS 25 mL  Admin Duration = Over 5-10 mins       okay to treat with final max dose   Medication = dexrazoxane  Base Dose = 250 mg/m2  Calc Dose:Base Dose x 1.8m2 = 450 mg  Final Dose = 465mg  Route = IV  Fluid & Volume =  mL  Admin Duration = Over 5-15 mins   Give immediately prior to doxorubicin       <10% difference, okay to treat with final dose   Medication = doxorubicin  Base Dose = 25 mg/m2  Calc Dose: Base Dose x 1.8m2 = 45mg  Final Dose = 46.5mg  Route = IV  Fluid & Volume = 5 mg/mL = 23.25mL in bag  Admin Duration = Over 15 mins          <10% difference, okay to treat with final dose     By my signature below, I confirm this process was performed independently with the BSA and all final chemotherapy dosing calculations congruent. I have reviewed the above chemotherapy order and that my calculation of the final dose and BSA (when applicable) corroborate those calculations of the  pharmacist.     JAE Wilson, Pharm.D.    "

## 2022-12-14 RX ORDER — HEPARIN SODIUM (PORCINE) LOCK FLUSH IV SOLN 100 UNIT/ML 100 UNIT/ML
SOLUTION INTRAVENOUS
Status: ACTIVE
Start: 2022-12-13 | End: 2022-12-13

## 2022-12-14 RX ORDER — ONDANSETRON 2 MG/ML
INJECTION INTRAMUSCULAR; INTRAVENOUS
Status: ACTIVE
Start: 2022-12-13 | End: 2022-12-13

## 2022-12-14 NOTE — PROGRESS NOTES
Pt to [facility] for doctors office visit, and chemotherapy administration.      Afebrile.  VSS.  Awake and alert in no acute distress.      Port accessed using a 22g 3/4 inch trevino needle with 1 attempt and labs collected by Tammie HOLMAN RN.  Pt tolerated well.      Dr. Faye to bedside, visit completed.     Chemotherapy dosage calculated independently by Dayna Damian, RN and Tammie Covington, RN and compared to road map for protocol [id number].  Calculations within 10% of written order.  Lab results reviewed.      Premedications and chemo given as ordered, see MAR.  Blood return verified prior to and after chemotherapy infusion.      See Chemotherapy flowsheet.  PT tolerated well.  No side effects or complications noted.  Port flushed per orders (see MAR) and de-accessed after completion. PT home with Mother.  Will return for next visit on 12/20/22.

## 2022-12-20 ENCOUNTER — DOCUMENTATION (OUTPATIENT)
Dept: HEALTH INFORMATION MANAGEMENT | Facility: OTHER | Age: 22
End: 2022-12-20
Payer: COMMERCIAL

## 2022-12-20 ENCOUNTER — PHARMACY VISIT (OUTPATIENT)
Dept: PHARMACY | Facility: MEDICAL CENTER | Age: 22
End: 2022-12-20
Payer: COMMERCIAL

## 2022-12-20 ENCOUNTER — HOSPITAL ENCOUNTER (OUTPATIENT)
Dept: INFUSION CENTER | Facility: MEDICAL CENTER | Age: 22
End: 2022-12-20
Attending: PEDIATRICS
Payer: COMMERCIAL

## 2022-12-20 VITALS
HEART RATE: 78 BPM | HEIGHT: 66 IN | WEIGHT: 154.32 LBS | RESPIRATION RATE: 20 BRPM | BODY MASS INDEX: 24.8 KG/M2 | SYSTOLIC BLOOD PRESSURE: 116 MMHG | TEMPERATURE: 98.7 F | DIASTOLIC BLOOD PRESSURE: 82 MMHG | OXYGEN SATURATION: 96 %

## 2022-12-20 DIAGNOSIS — Z51.11 ENCOUNTER FOR CHEMOTHERAPY MANAGEMENT: ICD-10-CM

## 2022-12-20 DIAGNOSIS — C91.Z0 B LYMPHOBLASTIC LEUKEMIA WITH T(9;22)(Q34;Q11.2);BCR-ABL1 (HCC): ICD-10-CM

## 2022-12-20 DIAGNOSIS — C91.00 PHILADELPHIA CHROMOSOME POSITIVE ACUTE LYMPHOBLASTIC LEUKEMIA (HCC): ICD-10-CM

## 2022-12-20 LAB
ALBUMIN SERPL BCP-MCNC: 3 G/DL (ref 3.2–4.9)
ALBUMIN/GLOB SERPL: 2.3 G/DL
ALP SERPL-CCNC: 71 U/L (ref 30–99)
ALT SERPL-CCNC: 180 U/L (ref 2–50)
ANION GAP SERPL CALC-SCNC: 12 MMOL/L (ref 7–16)
AST SERPL-CCNC: 103 U/L (ref 12–45)
BASOPHILS # BLD AUTO: 0.7 % (ref 0–1.8)
BASOPHILS # BLD: 0.01 K/UL (ref 0–0.12)
BILIRUB CONJ SERPL-MCNC: <0.2 MG/DL (ref 0.1–0.5)
BILIRUB INDIRECT SERPL-MCNC: NORMAL MG/DL (ref 0–1)
BILIRUB SERPL-MCNC: 0.7 MG/DL (ref 0.1–1.5)
BUN SERPL-MCNC: 15 MG/DL (ref 8–22)
CALCIUM ALBUM COR SERPL-MCNC: 8.7 MG/DL (ref 8.5–10.5)
CALCIUM SERPL-MCNC: 7.9 MG/DL (ref 8.5–10.5)
CHLORIDE SERPL-SCNC: 99 MMOL/L (ref 96–112)
CO2 SERPL-SCNC: 25 MMOL/L (ref 20–33)
CREAT SERPL-MCNC: 0.97 MG/DL (ref 0.5–1.4)
EOSINOPHIL # BLD AUTO: 0.01 K/UL (ref 0–0.51)
EOSINOPHIL NFR BLD: 0.7 % (ref 0–6.9)
ERYTHROCYTE [DISTWIDTH] IN BLOOD BY AUTOMATED COUNT: 59.7 FL (ref 35.9–50)
GFR SERPLBLD CREATININE-BSD FMLA CKD-EPI: 114 ML/MIN/1.73 M 2
GLOBULIN SER CALC-MCNC: 1.3 G/DL (ref 1.9–3.5)
GLUCOSE SERPL-MCNC: 129 MG/DL (ref 65–99)
HCT VFR BLD AUTO: 32.7 % (ref 42–52)
HGB BLD-MCNC: 11.3 G/DL (ref 14–18)
IMM GRANULOCYTES # BLD AUTO: 0.02 K/UL (ref 0–0.11)
IMM GRANULOCYTES NFR BLD AUTO: 1.5 % (ref 0–0.9)
LYMPHOCYTES # BLD AUTO: 0.63 K/UL (ref 1–4.8)
LYMPHOCYTES NFR BLD: 46.7 % (ref 22–41)
MCH RBC QN AUTO: 34.6 PG (ref 27–33)
MCHC RBC AUTO-ENTMCNC: 34.6 G/DL (ref 33.7–35.3)
MCV RBC AUTO: 100 FL (ref 81.4–97.8)
MONOCYTES # BLD AUTO: 0.02 K/UL (ref 0–0.85)
MONOCYTES NFR BLD AUTO: 1.5 % (ref 0–13.4)
NEUTROPHILS # BLD AUTO: 0.66 K/UL (ref 1.82–7.42)
NEUTROPHILS NFR BLD: 48.9 % (ref 44–72)
NRBC # BLD AUTO: 0 K/UL
NRBC BLD-RTO: 0 /100 WBC
PLATELET # BLD AUTO: 61 K/UL (ref 164–446)
PMV BLD AUTO: 12.5 FL (ref 9–12.9)
POTASSIUM SERPL-SCNC: 3.4 MMOL/L (ref 3.6–5.5)
PROT SERPL-MCNC: 4.3 G/DL (ref 6–8.2)
RBC # BLD AUTO: 3.27 M/UL (ref 4.7–6.1)
SODIUM SERPL-SCNC: 136 MMOL/L (ref 135–145)
WBC # BLD AUTO: 1.4 K/UL (ref 4.8–10.8)

## 2022-12-20 PROCEDURE — 700105 HCHG RX REV CODE 258: Performed by: PEDIATRICS

## 2022-12-20 PROCEDURE — 85025 COMPLETE CBC W/AUTO DIFF WBC: CPT

## 2022-12-20 PROCEDURE — 99214 OFFICE O/P EST MOD 30 MIN: CPT | Performed by: PEDIATRICS

## 2022-12-20 PROCEDURE — 80053 COMPREHEN METABOLIC PANEL: CPT

## 2022-12-20 PROCEDURE — 36591 DRAW BLOOD OFF VENOUS DEVICE: CPT

## 2022-12-20 PROCEDURE — 96411 CHEMO IV PUSH ADDL DRUG: CPT

## 2022-12-20 PROCEDURE — 96367 TX/PROPH/DG ADDL SEQ IV INF: CPT

## 2022-12-20 PROCEDURE — 96409 CHEMO IV PUSH SNGL DRUG: CPT

## 2022-12-20 PROCEDURE — 82657 ENZYME CELL ACTIVITY: CPT | Mod: 91

## 2022-12-20 PROCEDURE — 96375 TX/PRO/DX INJ NEW DRUG ADDON: CPT

## 2022-12-20 PROCEDURE — 700111 HCHG RX REV CODE 636 W/ 250 OVERRIDE (IP): Performed by: PEDIATRICS

## 2022-12-20 PROCEDURE — 82248 BILIRUBIN DIRECT: CPT

## 2022-12-20 PROCEDURE — A4212 NON CORING NEEDLE OR STYLET: HCPCS

## 2022-12-20 RX ORDER — HEPARIN SODIUM,PORCINE 10 UNIT/ML
30 VIAL (ML) INTRAVENOUS PRN
Status: CANCELLED | OUTPATIENT
Start: 2022-12-20

## 2022-12-20 RX ORDER — 0.9 % SODIUM CHLORIDE 0.9 %
20 VIAL (ML) INJECTION PRN
Status: CANCELLED | OUTPATIENT
Start: 2022-12-20

## 2022-12-20 RX ORDER — HEPARIN SODIUM (PORCINE) LOCK FLUSH IV SOLN 100 UNIT/ML 100 UNIT/ML
500 SOLUTION INTRAVENOUS PRN
Status: DISCONTINUED | OUTPATIENT
Start: 2022-12-20 | End: 2022-12-21 | Stop reason: HOSPADM

## 2022-12-20 RX ORDER — ONDANSETRON 2 MG/ML
8 INJECTION INTRAMUSCULAR; INTRAVENOUS ONCE
Status: COMPLETED | OUTPATIENT
Start: 2022-12-20 | End: 2022-12-20

## 2022-12-20 RX ORDER — HEPARIN SODIUM (PORCINE) LOCK FLUSH IV SOLN 100 UNIT/ML 100 UNIT/ML
500 SOLUTION INTRAVENOUS PRN
Status: CANCELLED | OUTPATIENT
Start: 2022-12-20

## 2022-12-20 RX ADMIN — HEPARIN 500 UNITS: 100 SYRINGE at 14:18

## 2022-12-20 RX ADMIN — ONDANSETRON 8 MG: 2 INJECTION INTRAMUSCULAR; INTRAVENOUS at 10:08

## 2022-12-20 RX ADMIN — DOXORUBICIN HYDROCHLORIDE 46.5 MG: 2 INJECTION, SOLUTION INTRAVENOUS at 14:03

## 2022-12-20 RX ADMIN — DEXRAZOXANE FOR INJECTION 465 MG: 500 INJECTION, POWDER, LYOPHILIZED, FOR SOLUTION INTRAVENOUS at 13:48

## 2022-12-20 RX ADMIN — VINCRISTINE SULFATE 2 MG: 1 INJECTION, SOLUTION INTRAVENOUS at 12:19

## 2022-12-20 ASSESSMENT — FIBROSIS 4 INDEX: FIB4 SCORE: 0.48

## 2022-12-20 NOTE — PROGRESS NOTES
"Pharmacy Chemotherapy Verification Note:    Dx: Ph+ B-ALL        Protocol: Delayed Intensification Part 1 per [id number] (On Study Arm B, ID number: [id number])         Allergies:  Amoxicillin     /68   Pulse (!) 131   Temp 37.2 °C (98.9 °F) (Temporal)   Resp (!) 22   Ht 1.665 m (5' 5.55\")   Wt 70 kg (154 lb 5.2 oz)   SpO2 97%   BMI 25.25 kg/m²  Body surface area is 1.8 meters squared.    Labs from 12/20/22  reviewed - all within treatment parameters.      Drug Order   (Drug name, dose, route, IV Fluid & volume, frequency, number of doses) Cycle: Delayed Intensification Day 15   Previous treatment: DI D8 12/13/22     Medication = vincristine  Base Dose = 1.5 mg/m2 (max 2 mg)  Calc Dose: Base Dose x 1.8m2 = >2mg  Final Dose = 2 mg  Route = IV  Fluid & Volume = NS 25 mL  Admin Duration = Over 5-10 mins       okay to treat with final max dose   Medication = dexrazoxane  Base Dose = 250 mg/m2  Calc Dose:Base Dose x 1.8m2 = 450 mg  Final Dose = 465mg  Route = IV  Fluid & Volume =  mL  Admin Duration = Over 5-15 mins   Give immediately prior to doxorubicin       <10% difference, okay to treat with final dose   Medication = doxorubicin  Base Dose = 25 mg/m2  Calc Dose: Base Dose x 1.8m2 = 45mg  Final Dose = 46.5mg  Route = IV  Fluid & Volume = 5 mg/mL = 23.25mL in bag  Admin Duration = Over 15 mins          <10% difference, okay to treat with final dose     By my signature below, I confirm this process was performed independently with the BSA and all final chemotherapy dosing calculations congruent. I have reviewed the above chemotherapy order and that my calculation of the final dose and BSA (when applicable) corroborate those calculations of the  pharmacist.     JAE Wilson, Pharm.D.    "

## 2022-12-20 NOTE — PROGRESS NOTES
"Pharmacy Chemotherapy Calculations Note:    Dx: B-ALL, PH+    Cycle: Delayed Intensification Day 15  On Study - [id number] arm B COG ID number: [id number]  Previous treatment: D8 = 12/13             Ht 1.665 m (5' 5.55\")   Wt 70 kg (154 lb 5.2 oz)   BMI 25.25 kg/m²  Body surface area is 1.8 meters squared.  Treatment plan 165 cm 75.5 kg 1.86 m²   Labs reviewed 12/20/2022; meet direct bili parameters according to treatment plan     vinCRIStine 1.5 mg/m² x 1.8 m² = 2 mg (dose cap)              <10% difference, OK to treat with final dose = 2 mg IV     DOXOrubicin 25 mg/m² x 1.8 m² = 45 mg              <10% difference, OK to treat with final dose = 46.5 mg IV     Dexrazoxane 250 mg/m² x 1.8 m² = 450 mg              <10% difference, OK to treat with final dose = 465 mg IV      Cherrie Decker, PharmD, BCOP, BCPS          "

## 2022-12-20 NOTE — PROGRESS NOTES
Meds-to-Beds: prescription order listed below delivered to patient's bedside. RN notified. Patient counseled.      Reviewed signs/symptoms of bleeding and when to seek medical attention.     Current Outpatient Medications   Medication Sig Dispense Refill    apixaban (ELIQUIS) 5mg Tab Take 1 Tablet by mouth 2 times a day. (Patient not taking: Reported on 12/20/2022) 60 Tablet 0      Ashley Pressley, PharmD

## 2022-12-21 NOTE — PROGRESS NOTES
Late Entry - Pt to [facility] for lab draw, doctors office visit, and chemotherapy administration.      Afebrile.  VSS.  Awake and alert in no acute distress.      Port accessed using a 22g 3/4 inch trevino needle with 1 attempt.  Labs drawn from the port without difficulty.   Pt tolerated well.      Chemotherapy dosage calculated independently by Mary Saravia, MONTSERRAT and Tammie Covington RN and compared to road map for protocol [id number].  Calculations within 10% of written order.  Lab results reviewed.      Premedications and chemo given as ordered, see MAR.  Blood return verified prior to, during, and after chemotherapy infusion.  See Chemotherapy flowsheet.  PT tolerated well.  No side effects or complications noted.  Port flushed per orders (see MAR) and de-accessed after completion. PT home with Mother.  Will return for next visit on 1/3/23.

## 2022-12-27 ENCOUNTER — HOSPITAL ENCOUNTER (INPATIENT)
Facility: MEDICAL CENTER | Age: 22
LOS: 18 days | DRG: 871 | End: 2023-01-14
Attending: EMERGENCY MEDICINE | Admitting: STUDENT IN AN ORGANIZED HEALTH CARE EDUCATION/TRAINING PROGRAM
Payer: COMMERCIAL

## 2022-12-27 ENCOUNTER — APPOINTMENT (OUTPATIENT)
Dept: RADIOLOGY | Facility: MEDICAL CENTER | Age: 22
DRG: 871 | End: 2022-12-27
Attending: EMERGENCY MEDICINE
Payer: COMMERCIAL

## 2022-12-27 ENCOUNTER — APPOINTMENT (OUTPATIENT)
Dept: CARDIOLOGY | Facility: MEDICAL CENTER | Age: 22
DRG: 871 | End: 2022-12-27
Attending: STUDENT IN AN ORGANIZED HEALTH CARE EDUCATION/TRAINING PROGRAM
Payer: COMMERCIAL

## 2022-12-27 ENCOUNTER — APPOINTMENT (OUTPATIENT)
Dept: RADIOLOGY | Facility: MEDICAL CENTER | Age: 22
DRG: 871 | End: 2022-12-27
Attending: STUDENT IN AN ORGANIZED HEALTH CARE EDUCATION/TRAINING PROGRAM
Payer: COMMERCIAL

## 2022-12-27 DIAGNOSIS — R52 PAIN: ICD-10-CM

## 2022-12-27 DIAGNOSIS — D64.9 ANEMIA, UNSPECIFIED TYPE: ICD-10-CM

## 2022-12-27 DIAGNOSIS — M79.18 MYALGIA, MULTIPLE SITES: ICD-10-CM

## 2022-12-27 DIAGNOSIS — R78.81 GRAM-NEGATIVE BACTEREMIA: ICD-10-CM

## 2022-12-27 DIAGNOSIS — A41.9 SEPSIS WITH ACUTE ORGAN DYSFUNCTION AND SEPTIC SHOCK, DUE TO UNSPECIFIED ORGANISM, UNSPECIFIED TYPE: ICD-10-CM

## 2022-12-27 DIAGNOSIS — Z91.89 AT HIGH RISK FOR VENOUS THROMBOEMBOLISM (VTE): ICD-10-CM

## 2022-12-27 DIAGNOSIS — A41.9 NEUTROPENIC SEPSIS (HCC): ICD-10-CM

## 2022-12-27 DIAGNOSIS — C91.Z0 B LYMPHOBLASTIC LEUKEMIA WITH T(9;22)(Q34;Q11.2);BCR-ABL1 (HCC): ICD-10-CM

## 2022-12-27 DIAGNOSIS — R57.9 SHOCK (HCC): ICD-10-CM

## 2022-12-27 DIAGNOSIS — R04.0 EPISTAXIS: ICD-10-CM

## 2022-12-27 DIAGNOSIS — E83.51 HYPOCALCEMIA: ICD-10-CM

## 2022-12-27 DIAGNOSIS — D70.9 NEUTROPENIC SEPSIS (HCC): ICD-10-CM

## 2022-12-27 DIAGNOSIS — C91.01 ACUTE LYMPHOBLASTIC LEUKEMIA (ALL) IN REMISSION (HCC): ICD-10-CM

## 2022-12-27 DIAGNOSIS — D69.6 THROMBOCYTOPENIA (HCC): ICD-10-CM

## 2022-12-27 DIAGNOSIS — T82.594A OBSTRUCTION OF CENTRAL LINE, INITIAL ENCOUNTER (HCC): ICD-10-CM

## 2022-12-27 DIAGNOSIS — R04.0 EPISTAXIS, RECURRENT: ICD-10-CM

## 2022-12-27 DIAGNOSIS — R65.21 SEPSIS WITH ACUTE ORGAN DYSFUNCTION AND SEPTIC SHOCK, DUE TO UNSPECIFIED ORGANISM, UNSPECIFIED TYPE: ICD-10-CM

## 2022-12-27 PROBLEM — K92.2 UPPER GI BLEED: Status: ACTIVE | Noted: 2022-12-27

## 2022-12-27 PROBLEM — E87.5 HYPERKALEMIA: Status: ACTIVE | Noted: 2022-12-27

## 2022-12-27 PROBLEM — E87.29 HIGH ANION GAP METABOLIC ACIDOSIS: Status: ACTIVE | Noted: 2022-12-27

## 2022-12-27 PROBLEM — R79.89 ELEVATED TROPONIN: Status: ACTIVE | Noted: 2022-12-27

## 2022-12-27 PROBLEM — R73.9 HYPERGLYCEMIA: Status: ACTIVE | Noted: 2022-12-27

## 2022-12-27 PROBLEM — E87.20 LACTIC ACID ACIDOSIS: Status: ACTIVE | Noted: 2022-12-27

## 2022-12-27 PROBLEM — N17.9 ACUTE KIDNEY INJURY (HCC): Status: ACTIVE | Noted: 2022-12-27

## 2022-12-27 PROBLEM — D65 DIC (DISSEMINATED INTRAVASCULAR COAGULATION) (HCC): Status: ACTIVE | Noted: 2022-12-27

## 2022-12-27 LAB
ABO GROUP BLD: NORMAL
ALBUMIN SERPL BCP-MCNC: 1.9 G/DL (ref 3.2–4.9)
ALBUMIN/GLOB SERPL: 1.6 G/DL
ALP SERPL-CCNC: 44 U/L (ref 30–99)
ALT SERPL-CCNC: 163 U/L (ref 2–50)
ANION GAP SERPL CALC-SCNC: 24 MMOL/L (ref 7–16)
ANION GAP SERPL CALC-SCNC: 28 MMOL/L (ref 7–16)
ANISOCYTOSIS BLD QL SMEAR: ABNORMAL
ANISOCYTOSIS BLD QL SMEAR: ABNORMAL
APPEARANCE UR: CLEAR
APTT PPP: 33.9 SEC (ref 24.7–36)
APTT PPP: 42.4 SEC (ref 24.7–36)
AST SERPL-CCNC: 46 U/L (ref 12–45)
B-OH-BUTYR SERPL-MCNC: 0.18 MMOL/L (ref 0.02–0.27)
BACTERIA #/AREA URNS HPF: NEGATIVE /HPF
BARCODED ABORH UBTYP: 5100
BARCODED ABORH UBTYP: 5100
BARCODED PRD CODE UBPRD: NORMAL
BARCODED PRD CODE UBPRD: NORMAL
BARCODED UNIT NUM UBUNT: NORMAL
BARCODED UNIT NUM UBUNT: NORMAL
BASE EXCESS BLDA CALC-SCNC: -18 MMOL/L (ref -4–3)
BASE EXCESS BLDV CALC-SCNC: -11 MMOL/L
BASOPHILS # BLD AUTO: 0 % (ref 0–1.8)
BASOPHILS # BLD AUTO: 0 % (ref 0–1.8)
BASOPHILS # BLD: 0 K/UL (ref 0–0.12)
BASOPHILS # BLD: 0 K/UL (ref 0–0.12)
BILIRUB SERPL-MCNC: 0.6 MG/DL (ref 0.1–1.5)
BILIRUB UR QL STRIP.AUTO: NEGATIVE
BLD GP AB SCN SERPL QL: NORMAL
BODY TEMPERATURE: 36.2 CENTIGRADE
BODY TEMPERATURE: ABNORMAL DEGREES
BUN SERPL-MCNC: 39 MG/DL (ref 8–22)
BUN SERPL-MCNC: 44 MG/DL (ref 8–22)
CA-I BLD ISE-SCNC: 1.02 MMOL/L (ref 1.1–1.3)
CA-I SERPL-SCNC: 1.1 MMOL/L (ref 1.1–1.3)
CALCIUM ALBUM COR SERPL-MCNC: 8.9 MG/DL (ref 8.5–10.5)
CALCIUM SERPL-MCNC: 6 MG/DL (ref 8.5–10.5)
CALCIUM SERPL-MCNC: 7.2 MG/DL (ref 8.5–10.5)
CFT BLD TEG: NORMAL MIN (ref 4.6–9.1)
CFT P HPASE BLD TEG: NORMAL MIN (ref 4.3–8.3)
CHLORIDE SERPL-SCNC: 104 MMOL/L (ref 96–112)
CHLORIDE SERPL-SCNC: 98 MMOL/L (ref 96–112)
CLOT ANGLE BLD TEG: NORMAL DEGREES (ref 63–78)
CO2 BLDA-SCNC: <10 MMOL/L (ref 20–33)
CO2 SERPL-SCNC: 7 MMOL/L (ref 20–33)
CO2 SERPL-SCNC: 8 MMOL/L (ref 20–33)
COLOR UR: YELLOW
COMPONENT CT 8504CT: NORMAL
COMPONENT CT 8504CT: NORMAL
CREAT SERPL-MCNC: 1.24 MG/DL (ref 0.5–1.4)
CREAT SERPL-MCNC: 1.29 MG/DL (ref 0.5–1.4)
CT.EXTRINSIC BLD ROTEM: NORMAL MIN (ref 0.8–2.1)
DAT C3D-SP REAG RBC QL: NORMAL
DAT IGG-SP REAG RBC QL: NORMAL
EKG IMPRESSION: NORMAL
EKG IMPRESSION: NORMAL
EOSINOPHIL # BLD AUTO: 0 K/UL (ref 0–0.51)
EOSINOPHIL # BLD AUTO: 0.02 K/UL (ref 0–0.51)
EOSINOPHIL NFR BLD: 0 % (ref 0–6.9)
EOSINOPHIL NFR BLD: 9.1 % (ref 0–6.9)
EPI CELLS #/AREA URNS HPF: NEGATIVE /HPF
ERYTHROCYTE [DISTWIDTH] IN BLOOD BY AUTOMATED COUNT: 53 FL (ref 35.9–50)
ERYTHROCYTE [DISTWIDTH] IN BLOOD BY AUTOMATED COUNT: 63.3 FL (ref 35.9–50)
ERYTHROCYTE [DISTWIDTH] IN BLOOD BY AUTOMATED COUNT: 64.8 FL (ref 35.9–50)
FIBRINOGEN PPP-MCNC: <60 MG/DL (ref 215–460)
FLUAV RNA SPEC QL NAA+PROBE: NEGATIVE
FLUBV RNA SPEC QL NAA+PROBE: NEGATIVE
GFR SERPLBLD CREATININE-BSD FMLA CKD-EPI: 81 ML/MIN/1.73 M 2
GFR SERPLBLD CREATININE-BSD FMLA CKD-EPI: 85 ML/MIN/1.73 M 2
GLOBULIN SER CALC-MCNC: 1.2 G/DL (ref 1.9–3.5)
GLUCOSE BLD STRIP.AUTO-MCNC: 142 MG/DL (ref 65–99)
GLUCOSE BLD STRIP.AUTO-MCNC: 49 MG/DL (ref 65–99)
GLUCOSE SERPL-MCNC: 169 MG/DL (ref 65–99)
GLUCOSE SERPL-MCNC: 311 MG/DL (ref 65–99)
GLUCOSE UR STRIP.AUTO-MCNC: NEGATIVE MG/DL
HCO3 BLDA-SCNC: 8.2 MMOL/L (ref 17–25)
HCO3 BLDV-SCNC: 14 MMOL/L (ref 24–28)
HCT VFR BLD AUTO: 13.7 % (ref 42–52)
HCT VFR BLD AUTO: 19.7 % (ref 42–52)
HCT VFR BLD AUTO: 25.5 % (ref 42–52)
HGB BLD-MCNC: 4.4 G/DL (ref 14–18)
HGB BLD-MCNC: 6.3 G/DL (ref 14–18)
HGB BLD-MCNC: 8.6 G/DL (ref 14–18)
HOROWITZ INDEX BLDA+IHG-RTO: 122 MM[HG]
HYALINE CASTS #/AREA URNS LPF: ABNORMAL /LPF
INR PPP: 1.8 (ref 0.87–1.13)
INR PPP: 3.18 (ref 0.87–1.13)
KETONES UR STRIP.AUTO-MCNC: NEGATIVE MG/DL
LACTATE SERPL-SCNC: 17.9 MMOL/L (ref 0.5–2)
LACTATE SERPL-SCNC: 17.9 MMOL/L (ref 0.5–2)
LACTATE SERPL-SCNC: 18.1 MMOL/L (ref 0.5–2)
LACTATE SERPL-SCNC: 9 MMOL/L (ref 0.5–2)
LDH SERPL L TO P-CCNC: 292 U/L (ref 107–266)
LEUKOCYTE ESTERASE UR QL STRIP.AUTO: NEGATIVE
LV EJECT FRACT  99904: 65
LYMPHOCYTES # BLD AUTO: 0.18 K/UL (ref 1–4.8)
LYMPHOCYTES # BLD AUTO: 0.29 K/UL (ref 1–4.8)
LYMPHOCYTES NFR BLD: 90.9 % (ref 22–41)
LYMPHOCYTES NFR BLD: 96.1 % (ref 22–41)
MACROCYTES BLD QL SMEAR: ABNORMAL
MACROCYTES BLD QL SMEAR: ABNORMAL
MAGNESIUM SERPL-MCNC: 2.1 MG/DL (ref 1.5–2.5)
MANUAL DIFF BLD: NORMAL
MANUAL DIFF BLD: NORMAL
MCF BLD TEG: NORMAL MM (ref 52–69)
MCF.PLATELET INHIB BLD ROTEM: NORMAL MM (ref 15–32)
MCH RBC QN AUTO: 30.9 PG (ref 27–33)
MCH RBC QN AUTO: 34.1 PG (ref 27–33)
MCH RBC QN AUTO: 34.9 PG (ref 27–33)
MCHC RBC AUTO-ENTMCNC: 32 G/DL (ref 33.7–35.3)
MCHC RBC AUTO-ENTMCNC: 32.1 G/DL (ref 33.7–35.3)
MCHC RBC AUTO-ENTMCNC: 33.7 G/DL (ref 33.7–35.3)
MCV RBC AUTO: 106.5 FL (ref 81.4–97.8)
MCV RBC AUTO: 108.7 FL (ref 81.4–97.8)
MCV RBC AUTO: 91.7 FL (ref 81.4–97.8)
MICRO URNS: ABNORMAL
MONOCYTES # BLD AUTO: 0 K/UL (ref 0–0.85)
MONOCYTES # BLD AUTO: 0.01 K/UL (ref 0–0.85)
MONOCYTES NFR BLD AUTO: 0 % (ref 0–13.4)
MONOCYTES NFR BLD AUTO: 3.9 % (ref 0–13.4)
MORPHOLOGY BLD-IMP: NORMAL
MORPHOLOGY BLD-IMP: NORMAL
NEUTROPHILS # BLD AUTO: 0 K/UL (ref 1.82–7.42)
NEUTROPHILS # BLD AUTO: 0 K/UL (ref 1.82–7.42)
NEUTROPHILS NFR BLD: 0 % (ref 44–72)
NEUTROPHILS NFR BLD: 0 % (ref 44–72)
NITRITE UR QL STRIP.AUTO: NEGATIVE
NRBC # BLD AUTO: 0 K/UL
NRBC # BLD AUTO: 0.03 K/UL
NRBC # BLD AUTO: 0.04 K/UL
NRBC BLD-RTO: 0 /100 WBC
NRBC BLD-RTO: 11.1 /100 WBC
NRBC BLD-RTO: 20 /100 WBC
O2/TOTAL GAS SETTING VFR VENT: 100 %
PA AA BLD-ACNC: NORMAL % (ref 0–11)
PA ADP BLD-ACNC: NORMAL % (ref 0–17)
PCO2 BLDA: 19 MMHG (ref 26–37)
PCO2 BLDV: 26.5 MMHG (ref 41–51)
PCO2 TEMP ADJ BLDA: 18.8 MMHG (ref 26–37)
PCO2 TEMP ADJ BLDV: 25.6 MMHG (ref 41–51)
PH BLDA: 7.24 [PH] (ref 7.4–7.5)
PH BLDV: 7.33 [PH] (ref 7.31–7.45)
PH TEMP ADJ BLDA: 7.24 [PH] (ref 7.4–7.5)
PH TEMP ADJ BLDV: 7.34 [PH] (ref 7.31–7.45)
PH UR STRIP.AUTO: 5 [PH] (ref 5–8)
PHOSPHATE SERPL-MCNC: 3.6 MG/DL (ref 2.5–4.5)
PLATELET # BLD AUTO: 12 K/UL (ref 164–446)
PLATELET # BLD AUTO: 6 K/UL (ref 164–446)
PLATELET # BLD AUTO: 69 K/UL (ref 164–446)
PLATELET BLD QL SMEAR: NORMAL
PLATELET BLD QL SMEAR: NORMAL
PLATELETS.RETICULATED NFR BLD AUTO: 8.6 K/UL (ref 0.6–13.1)
PMV BLD AUTO: 11.2 FL (ref 9–12.9)
PO2 BLDA: 122 MMHG (ref 64–87)
PO2 BLDV: 34.2 MMHG (ref 25–40)
PO2 TEMP ADJ BLDA: 121 MMHG (ref 64–87)
PO2 TEMP ADJ BLDV: 32.3 MMHG (ref 25–40)
POIKILOCYTOSIS BLD QL SMEAR: NORMAL
POTASSIUM BLD-SCNC: 5.5 MMOL/L (ref 3.6–5.5)
POTASSIUM SERPL-SCNC: 5.6 MMOL/L (ref 3.6–5.5)
POTASSIUM SERPL-SCNC: 6.4 MMOL/L (ref 3.6–5.5)
PROCALCITONIN SERPL-MCNC: 2.33 NG/ML
PROCALCITONIN SERPL-MCNC: 31 NG/ML
PRODUCT TYPE UPROD: NORMAL
PRODUCT TYPE UPROD: NORMAL
PROT SERPL-MCNC: 3.1 G/DL (ref 6–8.2)
PROT UR QL STRIP: NEGATIVE MG/DL
PROTHROMBIN TIME: 20.5 SEC (ref 12–14.6)
PROTHROMBIN TIME: 31.5 SEC (ref 12–14.6)
RBC # BLD AUTO: 1.26 M/UL (ref 4.7–6.1)
RBC # BLD AUTO: 1.85 M/UL (ref 4.7–6.1)
RBC # BLD AUTO: 2.78 M/UL (ref 4.7–6.1)
RBC # URNS HPF: ABNORMAL /HPF
RBC BLD AUTO: PRESENT
RBC BLD AUTO: PRESENT
RBC UR QL AUTO: ABNORMAL
RH BLD: NORMAL
RSV RNA SPEC QL NAA+PROBE: NEGATIVE
SAO2 % BLDA: 98 % (ref 93–99)
SAO2 % BLDV: 66.9 %
SARS-COV-2 RNA RESP QL NAA+PROBE: NOTDETECTED
SMUDGE CELLS BLD QL SMEAR: NORMAL
SODIUM BLD-SCNC: 131 MMOL/L (ref 135–145)
SODIUM SERPL-SCNC: 134 MMOL/L (ref 135–145)
SODIUM SERPL-SCNC: 135 MMOL/L (ref 135–145)
SP GR UR STRIP.AUTO: >=1.045
SPECIMEN DRAWN FROM PATIENT: ABNORMAL
SPECIMEN SOURCE: NORMAL
TEG ALGORITHM TGALG: NORMAL
TROPONIN T SERPL-MCNC: 799 NG/L (ref 6–19)
TROPONIN T SERPL-MCNC: 85 NG/L (ref 6–19)
UNIT STATUS USTAT: NORMAL
UNIT STATUS USTAT: NORMAL
UROBILINOGEN UR STRIP.AUTO-MCNC: 0.2 MG/DL
WBC # BLD AUTO: 0.2 K/UL (ref 4.8–10.8)
WBC # BLD AUTO: 0.2 K/UL (ref 4.8–10.8)
WBC # BLD AUTO: 0.3 K/UL (ref 4.8–10.8)
WBC #/AREA URNS HPF: ABNORMAL /HPF

## 2022-12-27 PROCEDURE — 82330 ASSAY OF CALCIUM: CPT | Mod: 91

## 2022-12-27 PROCEDURE — 84484 ASSAY OF TROPONIN QUANT: CPT

## 2022-12-27 PROCEDURE — 96368 THER/DIAG CONCURRENT INF: CPT

## 2022-12-27 PROCEDURE — 85007 BL SMEAR W/DIFF WBC COUNT: CPT

## 2022-12-27 PROCEDURE — 87077 CULTURE AEROBIC IDENTIFY: CPT | Mod: 91

## 2022-12-27 PROCEDURE — 700111 HCHG RX REV CODE 636 W/ 250 OVERRIDE (IP)

## 2022-12-27 PROCEDURE — 85576 BLOOD PLATELET AGGREGATION: CPT | Mod: 91

## 2022-12-27 PROCEDURE — 70486 CT MAXILLOFACIAL W/O DYE: CPT

## 2022-12-27 PROCEDURE — 700105 HCHG RX REV CODE 258: Performed by: STUDENT IN AN ORGANIZED HEALTH CARE EDUCATION/TRAINING PROGRAM

## 2022-12-27 PROCEDURE — 96366 THER/PROPH/DIAG IV INF ADDON: CPT

## 2022-12-27 PROCEDURE — 86945 BLOOD PRODUCT/IRRADIATION: CPT | Mod: 91

## 2022-12-27 PROCEDURE — 93308 TTE F-UP OR LMTD: CPT | Mod: 26 | Performed by: INTERNAL MEDICINE

## 2022-12-27 PROCEDURE — 71045 X-RAY EXAM CHEST 1 VIEW: CPT

## 2022-12-27 PROCEDURE — P9034 PLATELETS, PHERESIS: HCPCS

## 2022-12-27 PROCEDURE — 700111 HCHG RX REV CODE 636 W/ 250 OVERRIDE (IP): Performed by: STUDENT IN AN ORGANIZED HEALTH CARE EDUCATION/TRAINING PROGRAM

## 2022-12-27 PROCEDURE — 99292 CRITICAL CARE ADDL 30 MIN: CPT | Mod: GC | Performed by: STUDENT IN AN ORGANIZED HEALTH CARE EDUCATION/TRAINING PROGRAM

## 2022-12-27 PROCEDURE — 700101 HCHG RX REV CODE 250: Performed by: EMERGENCY MEDICINE

## 2022-12-27 PROCEDURE — 84145 PROCALCITONIN (PCT): CPT | Mod: 91

## 2022-12-27 PROCEDURE — P9012 CRYOPRECIPITATE EACH UNIT: HCPCS | Mod: 91

## 2022-12-27 PROCEDURE — 93005 ELECTROCARDIOGRAM TRACING: CPT | Performed by: EMERGENCY MEDICINE

## 2022-12-27 PROCEDURE — 96376 TX/PRO/DX INJ SAME DRUG ADON: CPT

## 2022-12-27 PROCEDURE — 74177 CT ABD & PELVIS W/CONTRAST: CPT

## 2022-12-27 PROCEDURE — 87086 URINE CULTURE/COLONY COUNT: CPT

## 2022-12-27 PROCEDURE — 86923 COMPATIBILITY TEST ELECTRIC: CPT | Mod: 91

## 2022-12-27 PROCEDURE — 85347 COAGULATION TIME ACTIVATED: CPT

## 2022-12-27 PROCEDURE — 86880 COOMBS TEST DIRECT: CPT | Mod: 91

## 2022-12-27 PROCEDURE — 80048 BASIC METABOLIC PNL TOTAL CA: CPT

## 2022-12-27 PROCEDURE — 82962 GLUCOSE BLOOD TEST: CPT | Mod: 91

## 2022-12-27 PROCEDURE — 96367 TX/PROPH/DG ADDL SEQ IV INF: CPT

## 2022-12-27 PROCEDURE — 87640 STAPH A DNA AMP PROBE: CPT

## 2022-12-27 PROCEDURE — 36415 COLL VENOUS BLD VENIPUNCTURE: CPT

## 2022-12-27 PROCEDURE — 700117 HCHG RX CONTRAST REV CODE 255: Performed by: EMERGENCY MEDICINE

## 2022-12-27 PROCEDURE — 30233R1 TRANSFUSION OF NONAUTOLOGOUS PLATELETS INTO PERIPHERAL VEIN, PERCUTANEOUS APPROACH: ICD-10-PCS | Performed by: PEDIATRICS

## 2022-12-27 PROCEDURE — P9016 RBC LEUKOCYTES REDUCED: HCPCS

## 2022-12-27 PROCEDURE — 82010 KETONE BODYS QUAN: CPT

## 2022-12-27 PROCEDURE — 85384 FIBRINOGEN ACTIVITY: CPT

## 2022-12-27 PROCEDURE — 93308 TTE F-UP OR LMTD: CPT

## 2022-12-27 PROCEDURE — 93005 ELECTROCARDIOGRAM TRACING: CPT

## 2022-12-27 PROCEDURE — 85025 COMPLETE CBC W/AUTO DIFF WBC: CPT

## 2022-12-27 PROCEDURE — 83605 ASSAY OF LACTIC ACID: CPT

## 2022-12-27 PROCEDURE — C9113 INJ PANTOPRAZOLE SODIUM, VIA: HCPCS | Performed by: STUDENT IN AN ORGANIZED HEALTH CARE EDUCATION/TRAINING PROGRAM

## 2022-12-27 PROCEDURE — 700105 HCHG RX REV CODE 258: Performed by: EMERGENCY MEDICINE

## 2022-12-27 PROCEDURE — 84132 ASSAY OF SERUM POTASSIUM: CPT

## 2022-12-27 PROCEDURE — 93005 ELECTROCARDIOGRAM TRACING: CPT | Performed by: STUDENT IN AN ORGANIZED HEALTH CARE EDUCATION/TRAINING PROGRAM

## 2022-12-27 PROCEDURE — 96375 TX/PRO/DX INJ NEW DRUG ADDON: CPT

## 2022-12-27 PROCEDURE — 86900 BLOOD TYPING SEROLOGIC ABO: CPT

## 2022-12-27 PROCEDURE — 99291 CRITICAL CARE FIRST HOUR: CPT | Mod: GC | Performed by: STUDENT IN AN ORGANIZED HEALTH CARE EDUCATION/TRAINING PROGRAM

## 2022-12-27 PROCEDURE — 70450 CT HEAD/BRAIN W/O DYE: CPT

## 2022-12-27 PROCEDURE — 81001 URINALYSIS AUTO W/SCOPE: CPT

## 2022-12-27 PROCEDURE — P9017 PLASMA 1 DONOR FRZ W/IN 8 HR: HCPCS | Mod: 91

## 2022-12-27 PROCEDURE — 36430 TRANSFUSION BLD/BLD COMPNT: CPT

## 2022-12-27 PROCEDURE — 30233L1 TRANSFUSION OF NONAUTOLOGOUS FRESH PLASMA INTO PERIPHERAL VEIN, PERCUTANEOUS APPROACH: ICD-10-PCS | Performed by: PEDIATRICS

## 2022-12-27 PROCEDURE — 93970 EXTREMITY STUDY: CPT

## 2022-12-27 PROCEDURE — 700101 HCHG RX REV CODE 250: Performed by: STUDENT IN AN ORGANIZED HEALTH CARE EDUCATION/TRAINING PROGRAM

## 2022-12-27 PROCEDURE — 86901 BLOOD TYPING SEROLOGIC RH(D): CPT

## 2022-12-27 PROCEDURE — 87641 MR-STAPH DNA AMP PROBE: CPT

## 2022-12-27 PROCEDURE — 83735 ASSAY OF MAGNESIUM: CPT

## 2022-12-27 PROCEDURE — 96365 THER/PROPH/DIAG IV INF INIT: CPT

## 2022-12-27 PROCEDURE — 85055 RETICULATED PLATELET ASSAY: CPT

## 2022-12-27 PROCEDURE — 770022 HCHG ROOM/CARE - ICU (200)

## 2022-12-27 PROCEDURE — 99232 SBSQ HOSP IP/OBS MODERATE 35: CPT | Performed by: PEDIATRICS

## 2022-12-27 PROCEDURE — 84100 ASSAY OF PHOSPHORUS: CPT

## 2022-12-27 PROCEDURE — 80053 COMPREHEN METABOLIC PANEL: CPT

## 2022-12-27 PROCEDURE — 87040 BLOOD CULTURE FOR BACTERIA: CPT

## 2022-12-27 PROCEDURE — 82803 BLOOD GASES ANY COMBINATION: CPT

## 2022-12-27 PROCEDURE — C9803 HOPD COVID-19 SPEC COLLECT: HCPCS | Performed by: EMERGENCY MEDICINE

## 2022-12-27 PROCEDURE — 83615 LACTATE (LD) (LDH) ENZYME: CPT

## 2022-12-27 PROCEDURE — 36600 WITHDRAWAL OF ARTERIAL BLOOD: CPT

## 2022-12-27 PROCEDURE — 99291 CRITICAL CARE FIRST HOUR: CPT

## 2022-12-27 PROCEDURE — 84295 ASSAY OF SERUM SODIUM: CPT

## 2022-12-27 PROCEDURE — 85610 PROTHROMBIN TIME: CPT | Mod: 91

## 2022-12-27 PROCEDURE — 30233M1 TRANSFUSION OF NONAUTOLOGOUS PLASMA CRYOPRECIPITATE INTO PERIPHERAL VEIN, PERCUTANEOUS APPROACH: ICD-10-PCS | Performed by: PEDIATRICS

## 2022-12-27 PROCEDURE — 85730 THROMBOPLASTIN TIME PARTIAL: CPT

## 2022-12-27 PROCEDURE — 87186 SC STD MICRODIL/AGAR DIL: CPT

## 2022-12-27 PROCEDURE — 86850 RBC ANTIBODY SCREEN: CPT

## 2022-12-27 PROCEDURE — 0241U HCHG SARS-COV-2 COVID-19 NFCT DS RESP RNA 4 TRGT MIC: CPT

## 2022-12-27 PROCEDURE — 700111 HCHG RX REV CODE 636 W/ 250 OVERRIDE (IP): Performed by: EMERGENCY MEDICINE

## 2022-12-27 PROCEDURE — 83010 ASSAY OF HAPTOGLOBIN QUANT: CPT

## 2022-12-27 RX ORDER — NOREPINEPHRINE BITARTRATE 0.03 MG/ML
0-1 INJECTION, SOLUTION INTRAVENOUS CONTINUOUS
Status: DISCONTINUED | OUTPATIENT
Start: 2022-12-27 | End: 2022-12-31

## 2022-12-27 RX ORDER — HYDROMORPHONE HYDROCHLORIDE 1 MG/ML
0.4 INJECTION, SOLUTION INTRAMUSCULAR; INTRAVENOUS; SUBCUTANEOUS
Status: DISCONTINUED | OUTPATIENT
Start: 2022-12-27 | End: 2023-01-02

## 2022-12-27 RX ORDER — ADENOSINE 3 MG/ML
6 INJECTION, SOLUTION INTRAVENOUS ONCE
Status: COMPLETED | OUTPATIENT
Start: 2022-12-27 | End: 2022-12-27

## 2022-12-27 RX ORDER — SODIUM CHLORIDE, SODIUM GLUCONATE, SODIUM ACETATE, POTASSIUM CHLORIDE AND MAGNESIUM CHLORIDE 526; 502; 368; 37; 30 MG/100ML; MG/100ML; MG/100ML; MG/100ML; MG/100ML
INJECTION, SOLUTION INTRAVENOUS CONTINUOUS
Status: DISCONTINUED | OUTPATIENT
Start: 2022-12-27 | End: 2022-12-27

## 2022-12-27 RX ORDER — SULFAMETHOXAZOLE AND TRIMETHOPRIM 400; 80 MG/1; MG/1
2 TABLET ORAL 2 TIMES DAILY
Status: ON HOLD | COMMUNITY
End: 2023-01-09 | Stop reason: SDUPTHER

## 2022-12-27 RX ORDER — SODIUM CHLORIDE 9 MG/ML
INJECTION, SOLUTION INTRAVENOUS CONTINUOUS
Status: DISCONTINUED | OUTPATIENT
Start: 2022-12-27 | End: 2022-12-27

## 2022-12-27 RX ORDER — SODIUM CHLORIDE 9 MG/ML
1000 INJECTION, SOLUTION INTRAVENOUS ONCE
Status: COMPLETED | OUTPATIENT
Start: 2022-12-27 | End: 2022-12-27

## 2022-12-27 RX ORDER — CALCIUM CHLORIDE 100 MG/ML
1 INJECTION INTRAVENOUS; INTRAVENTRICULAR ONCE
Status: COMPLETED | OUTPATIENT
Start: 2022-12-27 | End: 2022-12-27

## 2022-12-27 RX ORDER — SODIUM CHLORIDE 9 MG/ML
INJECTION, SOLUTION INTRAVENOUS CONTINUOUS
Status: ACTIVE | OUTPATIENT
Start: 2022-12-27 | End: 2022-12-28

## 2022-12-27 RX ORDER — FUROSEMIDE 10 MG/ML
10 INJECTION INTRAMUSCULAR; INTRAVENOUS
Status: DISCONTINUED | OUTPATIENT
Start: 2022-12-28 | End: 2022-12-27

## 2022-12-27 RX ORDER — HYDROMORPHONE HYDROCHLORIDE 2 MG/ML
INJECTION, SOLUTION INTRAMUSCULAR; INTRAVENOUS; SUBCUTANEOUS
Status: COMPLETED
Start: 2022-12-27 | End: 2022-12-27

## 2022-12-27 RX ORDER — IMATINIB MESYLATE 100 MG/1
200 TABLET, FILM COATED ORAL DAILY
COMMUNITY
End: 2023-01-15 | Stop reason: SDUPTHER

## 2022-12-27 RX ORDER — DEXMEDETOMIDINE HYDROCHLORIDE 4 UG/ML
.1-1.5 INJECTION, SOLUTION INTRAVENOUS CONTINUOUS
Status: DISCONTINUED | OUTPATIENT
Start: 2022-12-27 | End: 2022-12-28

## 2022-12-27 RX ORDER — ONDANSETRON 2 MG/ML
4 INJECTION INTRAMUSCULAR; INTRAVENOUS EVERY 6 HOURS PRN
Status: DISCONTINUED | OUTPATIENT
Start: 2022-12-27 | End: 2023-01-14 | Stop reason: HOSPADM

## 2022-12-27 RX ORDER — CALCIUM GLUCONATE 20 MG/ML
2 INJECTION, SOLUTION INTRAVENOUS ONCE
Status: COMPLETED | OUTPATIENT
Start: 2022-12-27 | End: 2022-12-27

## 2022-12-27 RX ORDER — SODIUM CHLORIDE 9 MG/ML
2500 INJECTION, SOLUTION INTRAVENOUS ONCE
Status: COMPLETED | OUTPATIENT
Start: 2022-12-27 | End: 2022-12-27

## 2022-12-27 RX ORDER — ONDANSETRON 2 MG/ML
4 INJECTION INTRAMUSCULAR; INTRAVENOUS ONCE
Status: COMPLETED | OUTPATIENT
Start: 2022-12-27 | End: 2022-12-27

## 2022-12-27 RX ORDER — HYDROMORPHONE HYDROCHLORIDE 1 MG/ML
0.6 INJECTION, SOLUTION INTRAMUSCULAR; INTRAVENOUS; SUBCUTANEOUS
Status: DISCONTINUED | OUTPATIENT
Start: 2022-12-27 | End: 2023-01-02

## 2022-12-27 RX ORDER — SODIUM CHLORIDE 9 MG/ML
30 INJECTION, SOLUTION INTRAVENOUS ONCE
Status: COMPLETED | OUTPATIENT
Start: 2022-12-27 | End: 2022-12-27

## 2022-12-27 RX ADMIN — FENTANYL CITRATE 50 MCG: 50 INJECTION INTRAMUSCULAR; INTRAVENOUS at 15:31

## 2022-12-27 RX ADMIN — FENTANYL CITRATE 50 MCG: 50 INJECTION, SOLUTION INTRAMUSCULAR; INTRAVENOUS at 14:07

## 2022-12-27 RX ADMIN — SODIUM CHLORIDE: 9 INJECTION, SOLUTION INTRAVENOUS at 17:34

## 2022-12-27 RX ADMIN — ONDANSETRON 4 MG: 2 INJECTION INTRAMUSCULAR; INTRAVENOUS at 17:48

## 2022-12-27 RX ADMIN — HYDROCORTISONE SODIUM SUCCINATE 100 MG: 100 INJECTION, POWDER, FOR SOLUTION INTRAMUSCULAR; INTRAVENOUS at 16:21

## 2022-12-27 RX ADMIN — VANCOMYCIN HYDROCHLORIDE 2000 MG: 500 INJECTION, POWDER, LYOPHILIZED, FOR SOLUTION INTRAVENOUS at 16:26

## 2022-12-27 RX ADMIN — THIAMINE HYDROCHLORIDE 500 MG: 100 INJECTION, SOLUTION INTRAMUSCULAR; INTRAVENOUS at 21:02

## 2022-12-27 RX ADMIN — NOREPINEPHRINE BITARTRATE 0.1 MCG/KG/MIN: 1 INJECTION, SOLUTION, CONCENTRATE INTRAVENOUS at 21:42

## 2022-12-27 RX ADMIN — PANTOPRAZOLE SODIUM 80 MG: 40 INJECTION, POWDER, FOR SOLUTION INTRAVENOUS at 17:41

## 2022-12-27 RX ADMIN — FENTANYL CITRATE 25 MCG: 50 INJECTION, SOLUTION INTRAMUSCULAR; INTRAVENOUS at 17:14

## 2022-12-27 RX ADMIN — VASOPRESSIN 0.03 UNITS/MIN: 20 INJECTION, SOLUTION INTRAMUSCULAR; SUBCUTANEOUS at 17:30

## 2022-12-27 RX ADMIN — SODIUM CHLORIDE 2250 ML: 9 INJECTION, SOLUTION INTRAVENOUS at 13:35

## 2022-12-27 RX ADMIN — DEXTROSE MONOHYDRATE 25 G: 100 INJECTION, SOLUTION INTRAVENOUS at 20:54

## 2022-12-27 RX ADMIN — SODIUM CHLORIDE 1000 ML: 9 INJECTION, SOLUTION INTRAVENOUS at 15:05

## 2022-12-27 RX ADMIN — HYDROMORPHONE HYDROCHLORIDE 0.6 MG: 1 INJECTION, SOLUTION INTRAMUSCULAR; INTRAVENOUS; SUBCUTANEOUS at 22:09

## 2022-12-27 RX ADMIN — SODIUM BICARBONATE 50 MEQ: 84 INJECTION, SOLUTION INTRAVENOUS at 16:23

## 2022-12-27 RX ADMIN — SODIUM CHLORIDE 1000 ML: 9 INJECTION, SOLUTION INTRAVENOUS at 16:45

## 2022-12-27 RX ADMIN — SODIUM CHLORIDE, SODIUM GLUCONATE, SODIUM ACETATE, POTASSIUM CHLORIDE AND MAGNESIUM CHLORIDE: 526; 502; 368; 37; 30 INJECTION, SOLUTION INTRAVENOUS at 23:09

## 2022-12-27 RX ADMIN — CALCIUM CHLORIDE 1 G: 100 INJECTION, SOLUTION INTRAVENOUS at 16:14

## 2022-12-27 RX ADMIN — CEFEPIME 2 G: 2 INJECTION, POWDER, FOR SOLUTION INTRAVENOUS at 23:05

## 2022-12-27 RX ADMIN — HYDROCORTISONE SODIUM SUCCINATE 100 MG: 100 INJECTION, POWDER, FOR SOLUTION INTRAMUSCULAR; INTRAVENOUS at 14:06

## 2022-12-27 RX ADMIN — CALCIUM GLUCONATE 2 G: 20 INJECTION, SOLUTION INTRAVENOUS at 21:01

## 2022-12-27 RX ADMIN — PANTOPRAZOLE SODIUM 8 MG/HR: 40 INJECTION, POWDER, FOR SOLUTION INTRAVENOUS at 18:00

## 2022-12-27 RX ADMIN — IOHEXOL 100 ML: 350 INJECTION, SOLUTION INTRAVENOUS at 14:20

## 2022-12-27 RX ADMIN — DEXMEDETOMIDINE 0.3 MCG/KG/HR: 200 INJECTION, SOLUTION INTRAVENOUS at 18:23

## 2022-12-27 RX ADMIN — HYDROMORPHONE HYDROCHLORIDE 0.6 MG: 2 INJECTION INTRAMUSCULAR; INTRAVENOUS; SUBCUTANEOUS at 18:09

## 2022-12-27 RX ADMIN — NOREPINEPHRINE BITARTRATE 0.1 MCG/KG/MIN: 1 INJECTION, SOLUTION, CONCENTRATE INTRAVENOUS at 13:45

## 2022-12-27 RX ADMIN — ADENOSINE 6 MG: 3 INJECTION INTRAVENOUS at 13:39

## 2022-12-27 RX ADMIN — CEFEPIME 2 G: 2 INJECTION, POWDER, FOR SOLUTION INTRAVENOUS at 14:46

## 2022-12-27 RX ADMIN — ONDANSETRON 4 MG: 2 INJECTION INTRAMUSCULAR; INTRAVENOUS at 14:08

## 2022-12-27 ASSESSMENT — ENCOUNTER SYMPTOMS
FEVER: 0
WEAKNESS: 1
SHORTNESS OF BREATH: 0
PALPITATIONS: 1
DIARRHEA: 0
VOMITING: 1
HEADACHES: 0
DIZZINESS: 0
MYALGIAS: 1
BLURRED VISION: 0
DIZZINESS: 1
ABDOMINAL PAIN: 1
LOSS OF CONSCIOUSNESS: 1
SHORTNESS OF BREATH: 1
ABDOMINAL PAIN: 0
NAUSEA: 1
SINUS PAIN: 1

## 2022-12-27 ASSESSMENT — PAIN DESCRIPTION - PAIN TYPE
TYPE: ACUTE PAIN

## 2022-12-27 ASSESSMENT — FIBROSIS 4 INDEX: FIB4 SCORE: 2.64

## 2022-12-27 NOTE — PROGRESS NOTES
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 12/13/2022 84 (H)     Alkaline Phosphatase 12/13/2022 70     Total Bilirubin 12/13/2022 1.2     Albumin 12/13/2022 4.4     Total Protein 12/13/2022 5.9 (L)     Globulin 12/13/2022 1.5 (L)     A-G Ratio 12/13/2022 2.9     Direct Bilirubin 12/13/2022 0.3     Indirect Bilirubin 12/13/2022 0.9     Correct Calcium 12/13/2022 8.4 (L)     GFR (CKD-EPI) 12/13/2022 135       Physical Exam:    Constitutional: Well-developed, well-nourished, and in no distress.  Well-appearing.  Slight cushingoid.  HENT: Normocephalic and atraumatic. No nasal congestion or rhinorrhea. Oropharynx is clear and moist. No oral ulcerations or sores.    Eyes: Conjunctivae are normal. Pupils are equal, round.  EOMI.  Nonicteric.  Neck: Normal range of motion of neck, no adenopathy.    Cardiovascular: Normal rate, regular rhythm and normal heart sounds.  No murmur heard. DP/radial pulses 2+, cap refill < 2 sec.  Pulmonary/Chest: Effort normal and breath sounds normal. No respiratory distress. Symmetric expansion.   No crackles or wheezes.  Abdomen: Soft. Bowel sounds are normal. No distension and no mass. There is no hepatosplenomegaly.    Genitourinary:  Deferred.  Musculoskeletal: Normal range of motion of lower and upper extremities bilaterally.   Neurological: Alert and oriented to person and place. Exhibits normal muscle tone bilaterally in upper and lower extremities. Gait normal. Coordination normal.    Skin: Skin is warm, dry and pink.  No rash or evidence of skin infection.  No pallor.   Psychiatric: Mood and affect normal for age.    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 and whose End of Induction IB MRD was both negative by flow cytometry and PCR who presents for scheduled chemotherapy readiness, Delayed Intensification, Day 8     1) Ph+ Precursor B-Cell Acute Lymphoblastic Leukemia, in MRD Remission:              - 2-3 weeks of symptoms              - Presenting WBC > 440 k/uL, hyperleukocytosis              - Start of Hydroxyurea (1500 mg PO Q8) 2320 on 5/27/2022  - discontinued after only 55 hours              - No steroid pretreatment              - 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              - Testicular status NEGATIVE                   - Flow cytometry of both 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, FISH positive for BCR-ABL1 translocation              - Enrolled on [facility] [id number]              - Initially enrolled on [facility] [id number] - but 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                            - Enrolled on [facility] [id number] and began study 6/13/2022              - Started imatinib therapy 6/3/2022 (split dosing of imatinib 400 mg PO QAM and 200 mg PO QPM) - continued at Day 15 of Induction 1A with imatinib 600 mg PO daily - remains 100% compliant with imatinib              - No changes needed in imatinib dosing to date               - End of Induction IA and IB MRD negative                         - WBC 6.2, Hgb 12.2, platelets 230    - ANC 5860, ALC 2010  - Creatinine 0.69  - AST 48, ALT 84                - Verified 100% adherence with dexamethasone 9 mg PO BID and imatinib 400 mg PO QAM and 250 mg QPM                [id number], AR Arm B, Delayed Intensification, Day 8:               ** Methotrexate 12 mg IT x1 dose on Day 1 (COMPLETE)  ** Vincristine 2 mg IV x 1 dose on Days 1, 8 (TODAY) and 15              ** Doxorubicin 46.5 mg IV on Days 1, 8 (TODAY) and 15              ** Dexrazoxane 465 mg IV on Days 1, 8 (TODAY) and 15 immediately prior to doxorubicin              ** PEG-Aspargase 4650 int units IV on Day 4 (COMPLETE)     ** Continue imatinib 400 mg QAM and 250 mg QPM                 2) Chemotherapy Related Pancytopenia:   - WBC 6.2, Hgb 12.2, platelets 230    - ANC 5860, ALC 2010  - No transfusion indicated    - Counts not yet dropping quickly, no transfusions indicated today  - Transfuse for hemoglobin less than 7  - Transfuse for platelets less than 10     3) Sixth Cranial Nerve Palsy (IMPROVED/RESOLVED):             - Followed by Dr. Carranza     4) At Risk of Opportunistic Lung Infection:             - Bactrim DS PO BID sat and Sun     5) Anxiety (IMPROVED GREATLY):     6) Social:             - Continue with support             - Continue school as tolerated     7) Access:               - R Port-A-Cath in place    8) Transaminitis:   - New transaminitis with AST 48 and ALT 84   - Bilirubin up to 1.2 with direct bilirubin 0.3   - No dose-limiting toxicity, proceed with doses at full dose as above      9) Research Participant:           [facility] - Source Data         Diagnosis: Ph+ Precursor B-Cell Acute Lymphoblastic Leukemia     Disease Status: EOI1A MRD NEGATIVE, EOI1B RD NEGATIVE, CNS3c, testicular negative, HSV1 IgG POSITIVE, CMV IgG NEGATIVE, VARICELLA IgG POSITIVE     Active Studies: [id number],  [id number]                                                                                                      Inactive Studies: [id number]                                                                                                                                                Optional Studies: None             Protocol: International Phase 3 trial in [place] 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      Treatment Plan: [id number](OS), AR-Arm B, Delayed Intensification, Day 8     Height: 1.650 m      Weight: 75.5 kg       BSA: 1.86 m²   (Start of Delayed Intensification 12/6/2022)                                                                                                                                           Performance Status: Karnofsky 100, ECOG  0 (updated 12/13/2022)     Treatment Plan Medications:  (verified 100% compliance)     Currently taking imatinib 400 mg PO QAM and 250 mg QPM  ** Current BSA 1.86 m² = NO WEIGHT BASED CHANGE IN IMATINIB DOSING **  ** Will obtain repeat BSA in 12 weeks **     COMPLETED dexamethasone 9 mg PO BID on Days 1-7      Evaluations / Study Labs:  (12/13/2022)     WBC 6.2, Hgb 12.2, platelets 230  ANC 5860, ALC 2010  Creatinine 0.69  AST 48, ALT 84     Therapy Given: (12/13/2022)  Vincristine 2 mg IV   Doxorubicin 46.5 mg IV   Dexrazoxane 465 mg IV         Disposition: Return to clinic 12/20/2022 for Day 15      Pepe Faye MD  Pediatric Hematology / Oncology  [facility]  Cell.  [phone]  [facility]. [phone]

## 2022-12-27 NOTE — ED NOTES
NRB removed and pt saturating above 95% on RA. Per ERP, OK for pt to have water. Small amount of water provided.

## 2022-12-27 NOTE — ED NOTES
Med Rec Complete per patient's mother at bedside  Allergies Reviewed with patient's mother  Patient's Preferred Pharmacy: [facility] [address].     Patient has recently completed taking Dexamethasone 9mg/dose on 12/26/22.     He is currently taking Imatinib as well in two separate doses during the day:  200mg taken every morning, and 400mg in every evening.     The patient is also taking Eliquis 5mg, twice daily as well.     The patient's mother reports that the patient currently takes a maintenance antibiotic (Septra) on Saturdays & Sundays as well.

## 2022-12-27 NOTE — ED NOTES
Pt roomed to T3. ERP immediately called to bedside. Pt on cardiac monitor, HR in the 170s. Pads on patient. Pt is orientedx4, and states he has pain everywhere. RR: 24-30.

## 2022-12-27 NOTE — ED NOTES
While in CT, BP decreased to 60s/30s. Levo gtt increased to 0.2 mcg/kg.min. Pt remained alert and oriented.

## 2022-12-27 NOTE — PROGRESS NOTES
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 12/20/2022 15     Creatinine 12/20/2022 0.97     Calcium 12/20/2022 7.9 (L)     AST(SGOT) 12/20/2022 103 (H)     ALT(SGPT) 12/20/2022 180 (H)     Alkaline Phosphatase 12/20/2022 71     Total Bilirubin 12/20/2022 0.7     Albumin 12/20/2022 3.0 (L)     Total Protein 12/20/2022 4.3 (L)     Globulin 12/20/2022 1.3 (L)     A-G Ratio 12/20/2022 2.3     Direct Bilirubin 12/20/2022 <0.2     Indirect Bilirubin 12/20/2022 see below     Correct Calcium 12/20/2022 8.7     GFR (CKD-EPI) 12/20/2022 114         Physical Exam:    Constitutional: Well-developed, well-nourished, and in no distress.  Very well-appearing.  Tired appearing.  Nontoxic appearing.  Minor cushingoid appearance.  HENT: Normocephalic and atraumatic. No nasal congestion or rhinorrhea. Oropharynx is clear and moist. No oral ulcerations or sores.    Eyes: Conjunctivae are normal. Pupils are equal, round.  EOMI.  Nonicteric.  Neck: Normal range of motion of neck, no adenopathy.    Cardiovascular: Normal rate, regular  rhythm and normal heart sounds.  No murmur heard. DP/radial pulses 2+, cap refill < 2 sec.  Pulmonary/Chest: Effort normal and breath sounds normal. No respiratory distress. Symmetric expansion.  No crackles or wheezes.  Abdomen: Soft. Bowel sounds are normal. No distension and no mass. There is no hepatosplenomegaly.    Genitourinary:  Deferred.  Musculoskeletal: Normal range of motion of lower and upper extremities bilaterally.   Neurological: Alert and oriented to person and place. Exhibits normal muscle tone bilaterally in upper and lower extremities. Gait normal. Coordination normal.    Skin: Skin is warm, dry and pink.  No rash or evidence of skin infection.  No pallor.   Psychiatric: Mood and affect normal for age.    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 and whose End of Induction IB MRD was both negative by flow cytometry and PCR who presents for scheduled chemotherapy readiness, Delayed Intensification, Day 15     1) Ph+ Precursor B-Cell Acute Lymphoblastic Leukemia, in MRD Remission:              - 2-3 weeks of symptoms              - Presenting WBC > 440 k/uL, hyperleukocytosis              - Start of Hydroxyurea (1500 mg PO Q8) 2320 on 5/27/2022  - discontinued after only 55 hours              - No steroid pretreatment              - 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              - Testicular status NEGATIVE                   - Flow cytometry of both 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, FISH positive for BCR-ABL1 translocation              - [place] [facility] [id number]              - [place] [facility] [id number] but 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                            - [place] [facility] [id number] and began study 6/13/2022              - Started imatinib therapy 6/3/2022 (split dosing of imatinib 400 mg  PO QAM and 200 mg PO QPM) - continued at Day 15 of Induction 1A with imatinib 600 mg PO daily - remains 100% compliant with imatinib              - No changes needed in imatinib dosing to date              - End of Induction IA and IB MRD negative                         - WBC 1.4, Hgb 11.3, platelets 61  - ,   - Creatinine 0.97   - , , total bilirubin 0.7 and direct bilirubin <0.7                - Verified 100% adherence with imatinib 400 mg PO QAM and 250 mg QPM                [id number], AR Arm B, Delayed Intensification, Day 15:               ** Methotrexate 12 mg IT x1 dose on Day 1 (COMPLETE)    ** Start Dexamethasone 9 mg PO BID x 7 days  ** Vincristine 2 mg IV x 1 dose on Days 1, 8 (COMPLETE) and 15 (TODAY)              ** Doxorubicin 46.5 mg IV on Days 1, 8 (COMPLETE) and 15 (TODAY)              ** Dexrazoxane 465 mg IV on Days 1, 8 (COMPLETE) and 15 (TODAY) immediately prior to doxorubicin              ** PEG-Aspargase 4650 int units IV on Day 4 (COMPLETE)     ** Continue imatinib 400 mg QAM and 250 mg QPM                 2) Chemotherapy Related Pancytopenia:    - WBC 1.4, Hgb 11.3, platelets 61  - ,   - No transfusion indicated  - Dropping counts quickly as expected  - Fever and neutropenia precautions  - Transfuse for hemoglobin less than 7  - Transfuse for platelets less than 10      3) Sixth Cranial Nerve Palsy (IMPROVED/RESOLVED):             - Followed by Dr. Carranza     4) At Risk of Opportunistic Lung Infection:             - Bactrim DS PO BID sat and Sun     5) Anxiety (IMPROVED GREATLY):     6) Social:             - Continue with support             - Continue school as tolerated     7) Access:               - R Port-A-Cath in place      8) Transaminitis:             - Worsening transaminitis with ,    - Bilirubin remains appropriate at 0.7 with direct <0.2   - No dose-limiting toxicities               9) JACOBY:   - Creatinine to 0.96 today  elevated from baseline, will continue to monitor    10) At Risk for PEG asparaginase Related Thrombosis:   - Restarted apixaban 5 mg PO BID at Day 8    11) Research Participant:           [facility] [facility] [facility] - Source Data         Diagnosis: Ph+ Precursor B-Cell Acute Lymphoblastic Leukemia     Disease Status: EOI1A MRD NEGATIVE, EOI1B RD NEGATIVE, CNS3c, testicular negative, HSV1 IgG POSITIVE, CMV IgG NEGATIVE, VARICELLA IgG POSITIVE     Active Studies: [id number], [id number]                                                                                                      Inactive Studies: [id number]                                                                                                                                                Optional Studies: None             Protocol: International Phase 3 trial in [place] 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      Treatment Plan: [id number](OS), AR-Arm B, Delayed Intensification, Day 15     Height: 1.650 m      Weight: 75.5 kg       BSA: 1.86 m²   (Start of Delayed Intensification 12/6/2022)                                                                                                                                           Performance Status: Karnofsky 100, ECOG  0 (updated 12/20/2022)     Treatment Plan Medications:  (verified 100% compliance)     Currently taking imatinib 400 mg PO QAM and 250 mg QPM  ** Current BSA 1.86 m² = NO WEIGHT BASED CHANGE IN IMATINIB DOSING **  ** Will obtain repeat BSA in 12 weeks **     Continue dexamethasone 9 mg PO BID Days 15-22      Evaluations / Study Labs:  (12/20/2022)     WBC 1.4, Hgb 11.3, platelets 61  ,      Therapy Given: (12/20/2022)    Vincristine 2 mg IV   Doxorubicin 46.5 mg IV  Dexrazoxane 465 mg IV         Disposition: Return to clinic for Day 29      Pepe Faye MD  Pediatric Hematology  / Oncology  [facility]  Cell.  [phone]  [facility]. [phone]

## 2022-12-27 NOTE — ED TRIAGE NOTES
Chief Complaint   Patient presents with    N/V     X1 week.     Syncope     While in the bathroom today, pt had a syncopal episode. Pt arrives with blood in his teeth.     Weakness     Increase in weakness x1 week.     Tachycardia     HR in the 180s for EMS.      Pt BIB EMS with above complaints. Pt has history of leukemia and is currently receiving chemotherapy. Pt has started to feel increase in weakness and N/V over the past week. Pt had an syncopal episode and EMS was called. Upon EMS arrival, pt was hypotensive in the 60/40s and HR in the 170s. Pt received approx. 500 cc fluids PTA.

## 2022-12-27 NOTE — ADDENDUM NOTE
Encounter addended by: Pepe Faye M.D. on: 12/27/2022 1:38 PM   Actions taken: Pend clinical note, Clinical Note Signed, Pharmacy for encounter modified, Level of Service modified

## 2022-12-27 NOTE — ED PROVIDER NOTES
ER Provider Note    Scribed for Mely Hammond M.d. by Justina Bethea. 12/27/2022  1:38 PM    Primary Care Provider: Margarito Arvizu M.D.  Means of Arrival: Walk in  History obtained from: patient    CHIEF COMPLAINT  Chief Complaint   Patient presents with    N/V     X1 week.     Syncope     While in the bathroom today, pt had a syncopal episode. Pt arrives with blood in his teeth.     Weakness     Increase in weakness x1 week.     Tachycardia     HR in the 180s for EMS.      LIMITATION TO HISTORY   Select: Altered mental status / Confusion    HPI  Tomas Jean-Baptiste is a 22 y.o. male with past medical history significant for leukemia who presents to the ED complaining of vomiting onset 1 week ago. Patient has associated weakness, palpitations, and lightheadedness. Patient describes he had an episode of syncope today and hit his face causing bleeding in his mouth.  He did have nausea and vomiting this morning.  Denies fevers. Patient has a history of cancer and was recently started on dexamethasone.  He thought it was the steroids that had been making him sick for the last week.  Per EMS the patient has been hypotensive and tachycardic with a starting pressure of 54/31 and heart rate of 170s-180s.    OUTSIDE HISTORIAN(S):  Select: EMS, Mother    EXTERNAL RECORDS REVIEWED  Select: Inpatient Notes  and Outpatient Notes which indicate patients last was last hospitalized for chemotherapy infusion on 11/14. Patient is followed by Dr. Faye.     REVIEW OF SYSTEMS  Review of Systems   Constitutional:  Negative for fever.   Eyes:  Negative for blurred vision.   Respiratory:  Negative for shortness of breath.    Cardiovascular:  Positive for palpitations.   Gastrointestinal:  Positive for abdominal pain and vomiting.   Musculoskeletal:  Positive for myalgias.   Neurological:  Positive for weakness. Negative for dizziness and headaches.   All other systems reviewed and are negative.     PAST MEDICAL HISTORY  Past  "Medical History:   Diagnosis Date    Cancer (HCC)     Leukoma        SURGICAL HISTORY  Past Surgical History:   Procedure Laterality Date    CATH PLACEMENT Right 8/29/2022    Procedure: INSERTION, CATHETER;  Surgeon: Simona Moise M.D.;  Location: SURGERY [facility];  Service: Ent       FAMILY HISTORY  Family History   Problem Relation Age of Onset    No Known Problems Mother     Stroke Maternal Grandmother     Diabetes Paternal Grandfather     Hypertension Paternal Grandfather     Hyperlipidemia Paternal Grandfather     Cancer Neg Hx     Heart Disease Neg Hx        SOCIAL HISTORY   reports that he has never smoked. He has never used smokeless tobacco. He reports that he does not drink alcohol and does not use drugs.    CURRENT MEDICATIONS  Previous Medications    APIXABAN (ELIQUIS) 5MG TAB    Take 5 mg by mouth 2 times a day.    CHLORHEXIDINE (PERIDEX) 0.12 % SOLUTION    Swish and spit 15 mL by mouth 2 times a day.    DEXAMETHASONE (DECADRON) 1.5 MG TAB    Take 2 Tablets by mouth every 12 hours.    DEXAMETHASONE (DECADRON) 6 MG TAB    Take 1 Tablet by mouth every 12 hours.    FAMOTIDINE (PEPCID) 20 MG TAB    Take 1 Tablet by mouth 2 times a day.    IMATINIB (GLEEVEC) 100 MG TABLET    Take 200 mg by mouth every day. 200 mg = 2 tablets    IMATINIB (GLEEVEC) 400 MG TABLET    TAKE 1 TABLET BY MOUTH  DAILY WITH TWO 100MG (600MG TOTAL DOSE)    ONDANSETRON (ZOFRAN ODT) 8 MG TABLET DISPERSIBLE    Dissovle 1 Tablet by mouth every 8 hours as needed for Nausea.    SULFAMETHOXAZOLE-TRIMETHOPRIM (BACTRIM) 400-80 MG TAB    Take 2 Tablets by mouth 2 times a day. Only on Saturday & Sunday    THERAPEUTIC MULTIVITAMIN-MINERALS (THERAGRAN-M) TAB    Take 1 Tablet by mouth every day.    VITAMIN D3 (CHOLECALCIFEROL) 1000 UNIT (25 MCG) TAB    Take 1 Tablet by mouth every day.       ALLERGIES  Amoxicillin    PHYSICAL EXAM  Ht 1.664 m (5' 5.5\")   Wt 75 kg (165 lb 5.5 oz)   BMI 27.10 kg/m²    RR 24  BP 82/35  Nursing note and " vitals reviewed.  Constitutional: Ill appearing, diaphoretic  HENT: Head is normocephalic and atraumatic. Dried blood on front teeth  Eyes: extra-ocular movements intact  Cardiovascular: Tachycardic, regular rhythm. No murmur heard.  Pulmonary/Chest: Breath sounds normal. No wheezes or rales.   Abdominal: Soft and non-tender. No distention.    Musculoskeletal: Extremities exhibit normal range of motion without edema or tenderness.   Neurological: Awake and alert,  no focal neurologic deficits  Skin: Skin is warm and dry. No rash.     DIAGNOSTIC STUDIES    Labs:   Labs Reviewed   LACTIC ACID - Abnormal; Notable for the following components:       Result Value    Lactic Acid 18.1 (*)     All other components within normal limits   CBC WITH DIFFERENTIAL - Abnormal; Notable for the following components:    WBC 0.3 (*)     RBC 1.85 (*)     Hemoglobin 6.3 (*)     Hematocrit 19.7 (*)     .5 (*)     MCH 34.1 (*)     MCHC 32.0 (*)     RDW 63.3 (*)     Platelet Count 12 (*)     Neutrophils-Polys 0.00 (*)     Lymphocytes 96.10 (*)     Neutrophils (Absolute) 0.00 (*)     Lymphs (Absolute) 0.29 (*)     Anisocytosis 2+ (*)     All other components within normal limits   COMP METABOLIC PANEL - Abnormal; Notable for the following components:    Sodium 134 (*)     Potassium 6.4 (*)     Co2 8 (*)     Anion Gap 28.0 (*)     Glucose 311 (*)     Bun 44 (*)     Calcium 7.2 (*)     AST(SGOT) 46 (*)     ALT(SGPT) 163 (*)     Albumin 1.9 (*)     Total Protein 3.1 (*)     Globulin 1.2 (*)     All other components within normal limits   URINALYSIS - Abnormal; Notable for the following components:    Specific Gravity >=1.045 (*)     Occult Blood Trace (*)     All other components within normal limits    Narrative:     Indication for culture:->Emergency Room Patient   PROCALCITONIN - Abnormal; Notable for the following components:    Procalcitonin 2.33 (*)     All other components within normal limits   TROPONIN - Abnormal; Notable for  the following components:    Troponin T 85 (*)     All other components within normal limits   BASIC METABOLIC PANEL - Abnormal; Notable for the following components:    Potassium 5.6 (*)     Co2 7 (*)     Glucose 169 (*)     Bun 39 (*)     Calcium 6.0 (*)     Anion Gap 24.0 (*)     All other components within normal limits   CBC WITH DIFFERENTIAL - Abnormal; Notable for the following components:    WBC 0.2 (*)     RBC 1.26 (*)     Hemoglobin 4.4 (*)     Hematocrit 13.7 (*)     .7 (*)     MCH 34.9 (*)     MCHC 32.1 (*)     RDW 64.8 (*)     Platelet Count 6 (*)     All other components within normal limits   LACTIC ACID - Abnormal; Notable for the following components:    Lactic Acid 17.9 (*)     All other components within normal limits   URINE MICROSCOPIC (W/UA) - Abnormal; Notable for the following components:    WBC 0-2 (*)     RBC 2-5 (*)     Hyaline Cast 3-5 (*)     All other components within normal limits    Narrative:     Indication for culture:->Emergency Room Patient   POCT ARTERIAL BLOOD GAS DEVICE RESULTS - Abnormal; Notable for the following components:    Ph 7.242 (*)     Pco2 19.0 (*)     Po2 122 (*)     Tco2 <10 (*)     Hco3 8.2 (*)     BE -18 (*)     Ph Temp Ameena 7.244 (*)     Pco2 Temp Co 18.8 (*)     Po2 Temp Cor 121 (*)     All other components within normal limits   POCT SODIUM DEVICE RESULTS - Abnormal; Notable for the following components:    Istat Sodium 131 (*)     All other components within normal limits   POCT IONIZED CA DEVICE RESULTS - Abnormal; Notable for the following components:    Istat Ionized Calcium 1.02 (*)     All other components within normal limits   COV-2, FLU A/B, AND RSV BY PCR (CEPHEID)   CORRECTED CALCIUM   ESTIMATED GFR   BETA-HYDROXYBUTYRIC ACID   PLATELETS REQUEST   DIFFERENTIAL MANUAL   PERIPHERAL SMEAR REVIEW   PLATELET ESTIMATE   MORPHOLOGY   IMMATURE PLT FRACTION   COD (ADULT)   ESTIMATED GFR   TROPONIN   LACTIC ACID   URINE CULTURE(NEW)    Narrative:      "Indication for culture:->Emergency Room Patient   BLOOD CULTURE    Narrative:     Per Hospital Policy: Only change Specimen Src: to \"Line\" if  specified by physician order.   BLOOD CULTURE    Narrative:     Per Hospital Policy: Only change Specimen Src: to \"Line\" if  specified by physician order.   PROCALCITONIN   COMPONENT CELLULAR   APTT    Narrative:     Indicate which anticoagulants the patient is on:->NONE  Do you want to extend TEG graph to LY30? (If no, graph will  terminate at MA)->No   PROTHROMBIN TIME    Narrative:     Indicate which anticoagulants the patient is on:->NONE  Do you want to extend TEG graph to LY30? (If no, graph will  terminate at MA)->No   LDH    Narrative:     Indicate which anticoagulants the patient is on:->NONE  Do you want to extend TEG graph to LY30? (If no, graph will  terminate at MA)->No   HAPTOGLOBIN    Narrative:     Indicate which anticoagulants the patient is on:->NONE  Do you want to extend TEG graph to LY30? (If no, graph will  terminate at MA)->No   SYLVESTER WITH ANTI-IGG REAGENT AND ANTI-C3D    Narrative:     Print Consent?->No   FIBRINOGEN    Narrative:     Indicate which anticoagulants the patient is on:->NONE  Do you want to extend TEG graph to LY30? (If no, graph will  terminate at MA)->No   PLATELET MAPPING WITH BASIC TEG    Narrative:     Indicate which anticoagulants the patient is on:->NONE  Do you want to extend TEG graph to LY30? (If no, graph will  terminate at MA)->No   MAGNESIUM    Narrative:     Indicate which anticoagulants the patient is on:->NONE  Do you want to extend TEG graph to LY30? (If no, graph will  terminate at MA)->No   MAGNESIUM   PHOSPHORUS   PHOSPHORUS    Narrative:     Indicate which anticoagulants the patient is on:->NONE  Do you want to extend TEG graph to LY30? (If no, graph will  terminate at MA)->No   PROCALCITONIN    Narrative:     Indicate which anticoagulants the patient is on:->NONE  Do you want to extend TEG graph to LY30? (If no, graph " will  terminate at MA)->No   LACTIC ACID    Narrative:     Indicate which anticoagulants the patient is on:->NONE  Do you want to extend TEG graph to LY30? (If no, graph will  terminate at MA)->No   RELEASE PLATELET PHERESIS   POCT POTASSIUM DEVICE RESULTS   TRANSFUSE PLATELET PHERESIS-NURSING COMMUNICATION   TRANSFUSE RED BLOOD CELLS-NURSING COMMUNICATION (UNITS)      All labs reviewed by me.    EKG:   EKG at 1:25 PM: Sinus tachycardia at a rate of 169, normal axis, intervals notable for slightly prolonged QRS of 121 with nonspecific intraventricular conduction delay, TN 77, QTc 465, no acute ST-T segment changes, no evidence of acute arrhythmia or ischemia.    EKG at 1:37 PM: Sinus tachycardia at a rate of 149, normal axis, normal intervals, TN 83, , QTc 479, no acute ST-T segment changes, no evidence of acute arrhythmia or ischemia    Radiology:   Independently reviewed patients CT in radiology.  CT-ABDOMEN-PELVIS WITH   Final Result      1.  Hepatic steatosis.   2.  No acute inflammatory abnormality within the abdomen or pelvis.      CT-MAXILLOFACIAL W/O PLUS RECONS   Final Result      1.  Essentially negative maxillofacial sinus CT. Small retention cyst in the alveolar recess of the right maxillary sinus of no clinical significance.      CT-HEAD W/O   Final Result      Head CT without contrast within normal limits. No evidence of acute cerebral infarction, hemorrhage or mass lesion.         DX-CHEST-PORTABLE (1 VIEW)   Final Result      No evidence of acute cardiopulmonary process.            COURSE & MEDICAL DECISION MAKING     Nursing notes, vital signs, [id number] reviewed in chart     Differential diagnoses include but not limited to see below    Prior records reviewed which indicate patients last was last hospitalized for a methotrexate infusion on 11/14. Patient is followed by Dr. Faye.     Escalation of care considered, and ultimately not performed: see below.    Barriers to care at this time,  including but not limited to: none.     Diagnostic tests and prescription drugs considered including, but not limited to: see below.    In addition to the chief complaint, the following problems were addressed: none    The attending emergency physician discussed management of the patient with the following physicians and SHAHIDA's:  Consult Dr. Ybarra (Peds ICU), intensivist Paulie Naik and Consultant Oncology  Dr. Duenas/Dr. Fyae    Discussion of management with other [facility] or appropriate source(s): Select: Pharmacy , RT , Consult Dr. Ybarra (Peds ICU), intensivist Paulie Naik and Consultant Oncology  Dr. Duenas/Dr. Faye    PLAN AND DISPOSITION   1:22 PM Patient seen and examined at bedside. Discussed plan of care, including labs and imaging. Patient agrees to the plan of care. The patient will be resuscitated with 1L NS IV for sepsis.    1:38 PM Patient was treated with adenosine. His heart rate from improved from  160-140 to 120-130 bpm.    1:43 PM Patients heart rate returned to 160 bpm. Antibiotics and levophed started at this time.    1:55 PM Accompanied patient to CT. No obvious findings noted on the [facility], [place], operated by [facility] abdominal CT.    2:00 PM oncology at bedside. Recommended a stress dose of steroids.     2:07 PM Patients blood pressure is improved to 128/48 after levophed. He will be treated with fentanyl and zofran.    2:20 PM Obtaining bedside ISTAT to check patients electrolytes and blood glucose.    2:30 PM Spoke to Dr. Ybarra who will determine if they have a bed in the Peds ICU for the patient.    2:35 PM Patient has a low platelet count and will receive a blood transfusion.     BLOOD CONSENT  I have explained to the patient the risks and benefits of transfusion of blood products.  This includes, as appropriate, the risk of mild allergic reaction, hemolytic reaction, transfusion-associated lung injury, febrile reactions, circulatory or iron overload, and infection.    We discussed possible alternatives and their  risks, including directed donation, autologous transfusion, and no transfusion, including IV or oral iron supplementation, as appropriate.  I believe the patient understands the risks and benefits and was able to express understanding.    2:44 PM Patient was reevaluated at bedside. He remains tachycardic in the 170 range. Dr. Faye at bedside. Lactic acid returned at 18.1 and Bi[employer] of 8.    2:50 PM Repeat lactic acid will be drawn now after fluid resuscitation.    3:00 PM I found out that there are no pediatric ICU beds available at this time, patient will be admitted to the medical ICU    3:55 PM discussed case with intensivist Dr. Apodaca who will come down evaluate patient and hospitalize for ongoing management.  Patient's Levophed is now at 0.6 , discussed this with Dr. Apodaca we will also initiate vasopressin    4:26PM patient has not been able to urinate, discussed Baer catheterization with oncology, given his severe immunosuppression at this time would avoid Bare catheterization.  They are recommending straight cath if needed.  Patient is straight cathed and 400 cc of urine is obtained    4:42PM patient is still tachycardic, I feel this is contributing to his hypotension.  Discussed this with Dr. Apodaca and pharmacist.  We may consider initiating esmolol drip if blood products are not improving his rate.  He is currently receiving platelets and red blood cells Lovenox.  He is being transferred to the ICU in critical condition.    HYDRATION: Based on the patient's presentation of Sepsis the patient was given IV fluids. IV Hydration was used because oral hydration was not adequate alone. Upon recheck following hydration, the patient was improved.     DISCUSSION  Patient is a 22-year-old male who comes in for evaluation of nausea, vomiting abdominal pain.  Differential diagnosis includes sepsis, bacteremia, abdominal infection, pneumonia, urinary tract infection, close head injury, intracranial hemorrhage,  facial fracture, viral syndrome.  On arrival patient is hypotensive and tachycardic in the 170s.  Large-bore IV was established by EMS, his port is accessed immediately for further resuscitation.  He is started on IV fluids on pressure bags.  EKG done at bedside demonstrates sinus tachycardia.  This may be SVT and therefore I will attempt cardioversion.  Given patient's recent chemotherapy he is also high risk for infection and sepsis protocol was initiated as well.    Patient's blood pressure improved to the 110s systolic with fluid resuscitation.  I attempted to give patient adenosine and heart rate improved to 120s, repeat EKG demonstrated sinus tachycardia, blood pressure did not improve with improvement in rate.  After few minutes heart rate returned to the 170s.  At this time is this is sinus tachycardia I do not believe he would benefit from electrical cardioversion.  Given concern for sepsis patient is empirically started on cefepime and vancomycin.  Pharmacy team was at bedside and agrees with this plan of antibiotics.  Given patient's facial and abdominal pain I elected to take him to CT scanner for further evaluation with scans of his head and abdomen.  I accompanied patient to CT and we were able to get the scans however patient did become more hypotensive while in the CT scanner.  We went back to his room patient was started on Levophed through his port.  CT imaging demonstrated no acute intra-abdominal or intracranial processes.    Maps improved with Levophed, he did ultimately required titration of Levophed up to 0.7.  Pediatric oncologist Dr. Duenas was at bedside when patient returned from CT scan.  Labs are returning and were notable for anemia and thrombocytopenia and therefore she recommended irradiated platelets and blood.  Pediatric oncology did want patient in the pediatric ICU, I discussed the case with Dr. Ybarra however they do not have beds available at this time, therefore patient will be  hospitalized in the medical ICU.  Therefore ultimately discussed the case with Dr. Apodaca.  Labs returned and were also notable for elevated troponin likely secondary to rate dependent and acute illness, no evidence of acute ischemia on EKG.  Procalcitonin is elevated 2.33 consistent with likely bacterial infection, most likely bacteremia.    Initial metabolic panel notable for hyperglycemia, anion gap and low bicarb, likely secondary to his lactic acidosis of 18.  There is possible concern per pediatric oncology from steroid-induced DKA.  Repeat labs after fluid resuscitation demonstrate blood sugar 169, anion gap of 24 and bicarb of 7.  Discussed potential need for insulin infusion with intensivist Dr. Apodaca who would not recommend starting it at this time, recommends continued aggressive fluid resuscitation.  Gap acidosis likely secondary to the lactic acidosis from his sepsis.  Viral swabs are trended and are negative for infection.  Chest x-ray returns demonstrates no evidence of pneumonia.    Patient's labs are also notable for hypocalcemia, this was prior to blood products.  Given his tachycardia and systemic body aches he was treated with calcium chloride through his port.  During his emergency department course he remained persistently tachycardic.  Discussed with pharmacy team and Dr. Apodaca about possible rate control.  Dr. Apodaca will consider rate control if he does not respond to blood product administration.  He is started on platelets and transferred to the ICU in critical condition.      CRITICAL CARE  The very real possibilty of a deterioration of this patient's condition required the highest level of my preparedness for sudden, emergent intervention.  I provided critical care services, which included medication orders, frequent reevaluations of the patient's condition and response to treatment, ordering and reviewing test results, and discussing the case with various consultants.  The critical care  time associated with the care of the patient was 90 minutes. Review chart for interventions. This time is exclusive of any other billable procedures.      DISPOSITION:  Patient will be hospitalized by Dr. Apodaca in critical condition.     FINAL IMPRESSION   1. Sepsis with acute organ dysfunction and septic shock, due to unspecified organism, unspecified type (HCC)    2. Anemia, unspecified type    3. Thrombocytopenia (HCC)    4. Hypocalcemia          Jsutina LOYD (Scribe), am scribing for, and in the presence of, Mely Hammond M.D..    Electronically signed by: Justina Bethea (Scribbrooks), 12/27/2022    Mely LOYD M.D. personally performed the services described in this documentation, as scribed by Justina Bethea in my presence, and it is both accurate and complete.     The note accurately reflects work and decisions made by me.  Mely Hammond M.D.  12/27/2022  5:06 PM

## 2022-12-27 NOTE — ED NOTES
1335: Port accessed without difficulty.  1337: Normal saline bolus x2 running alondra pressure bags.   1338: RT, pharmacy, and ERP at bedside for adenosine.   1339: 6 mg adenosine administered.   1340: HR decreased to 120-130.

## 2022-12-28 ENCOUNTER — HOSPITAL ENCOUNTER (INPATIENT)
Dept: RADIOLOGY | Facility: MEDICAL CENTER | Age: 22
DRG: 871 | End: 2022-12-28
Attending: INTERNAL MEDICINE
Payer: COMMERCIAL

## 2022-12-28 LAB
ALBUMIN SERPL BCP-MCNC: 2.4 G/DL (ref 3.2–4.9)
ALBUMIN/GLOB SERPL: 1.7 G/DL
ALP SERPL-CCNC: 44 U/L (ref 30–99)
ALT SERPL-CCNC: 183 U/L (ref 2–50)
ANION GAP SERPL CALC-SCNC: 14 MMOL/L (ref 7–16)
ANION GAP SERPL CALC-SCNC: 15 MMOL/L (ref 7–16)
ANION GAP SERPL CALC-SCNC: 6 MMOL/L (ref 7–16)
ANION GAP SERPL CALC-SCNC: 9 MMOL/L (ref 7–16)
ANISOCYTOSIS BLD QL SMEAR: ABNORMAL
APTT PPP: 33.1 SEC (ref 24.7–36)
APTT PPP: 36.7 SEC (ref 24.7–36)
APTT PPP: 38.7 SEC (ref 24.7–36)
APTT PPP: 41.4 SEC (ref 24.7–36)
AST SERPL-CCNC: 209 U/L (ref 12–45)
BARCODED ABORH UBTYP: 5100
BARCODED ABORH UBTYP: 5100
BARCODED ABORH UBTYP: 6200
BARCODED ABORH UBTYP: 7300
BARCODED PRD CODE UBPRD: NORMAL
BARCODED UNIT NUM UBUNT: NORMAL
BASE EXCESS BLDV CALC-SCNC: -7 MMOL/L
BASE EXCESS BLDV CALC-SCNC: 0 MMOL/L
BASOPHILS # BLD AUTO: 0 % (ref 0–1.8)
BASOPHILS # BLD: 0 K/UL (ref 0–0.12)
BILIRUB SERPL-MCNC: 2 MG/DL (ref 0.1–1.5)
BODY TEMPERATURE: 36.3 CENTIGRADE
BODY TEMPERATURE: ABNORMAL CENTIGRADE
BUN SERPL-MCNC: 32 MG/DL (ref 8–22)
BUN SERPL-MCNC: 37 MG/DL (ref 8–22)
BUN SERPL-MCNC: 38 MG/DL (ref 8–22)
BUN SERPL-MCNC: 40 MG/DL (ref 8–22)
CA-I SERPL-SCNC: 1 MMOL/L (ref 1.1–1.3)
CA-I SERPL-SCNC: 1 MMOL/L (ref 1.1–1.3)
CA-I SERPL-SCNC: 1.1 MMOL/L (ref 1.1–1.3)
CA-I SERPL-SCNC: 1.1 MMOL/L (ref 1.1–1.3)
CALCIUM ALBUM COR SERPL-MCNC: 8.6 MG/DL (ref 8.5–10.5)
CALCIUM SERPL-MCNC: 6.6 MG/DL (ref 8.5–10.5)
CALCIUM SERPL-MCNC: 7 MG/DL (ref 8.5–10.5)
CALCIUM SERPL-MCNC: 7 MG/DL (ref 8.5–10.5)
CALCIUM SERPL-MCNC: 7.3 MG/DL (ref 8.5–10.5)
CFT BLD TEG: 8 MIN (ref 4.6–9.1)
CFT BLD TEG: 8.5 MIN (ref 4.6–9.1)
CFT P HPASE BLD TEG: 12.4 MIN (ref 4.3–8.3)
CFT P HPASE BLD TEG: 7.2 MIN (ref 4.3–8.3)
CHLORIDE SERPL-SCNC: 105 MMOL/L (ref 96–112)
CHLORIDE SERPL-SCNC: 110 MMOL/L (ref 96–112)
CHLORIDE SERPL-SCNC: 112 MMOL/L (ref 96–112)
CHLORIDE SERPL-SCNC: 114 MMOL/L (ref 96–112)
CLOT ANGLE BLD TEG: 61.4 DEGREES (ref 63–78)
CLOT ANGLE BLD TEG: 68.4 DEGREES (ref 63–78)
CLOT LYSIS 30M P MA LENFR BLD TEG: 0 % (ref 0–2.6)
CLOT LYSIS 30M P MA LENFR BLD TEG: 0 % (ref 0–2.6)
CO2 SERPL-SCNC: 14 MMOL/L (ref 20–33)
CO2 SERPL-SCNC: 18 MMOL/L (ref 20–33)
CO2 SERPL-SCNC: 23 MMOL/L (ref 20–33)
CO2 SERPL-SCNC: 25 MMOL/L (ref 20–33)
COMPONENT F 8504F: NORMAL
CREAT SERPL-MCNC: 0.7 MG/DL (ref 0.5–1.4)
CREAT SERPL-MCNC: 0.86 MG/DL (ref 0.5–1.4)
CREAT SERPL-MCNC: 0.99 MG/DL (ref 0.5–1.4)
CREAT SERPL-MCNC: 1.07 MG/DL (ref 0.5–1.4)
CT.EXTRINSIC BLD ROTEM: 1.7 MIN (ref 0.8–2.1)
CT.EXTRINSIC BLD ROTEM: 3.1 MIN (ref 0.8–2.1)
EKG IMPRESSION: NORMAL
EOSINOPHIL # BLD AUTO: 0 K/UL (ref 0–0.51)
EOSINOPHIL NFR BLD: 0 % (ref 0–6.9)
ERYTHROCYTE [DISTWIDTH] IN BLOOD BY AUTOMATED COUNT: 53.9 FL (ref 35.9–50)
ERYTHROCYTE [DISTWIDTH] IN BLOOD BY AUTOMATED COUNT: 54.4 FL (ref 35.9–50)
ERYTHROCYTE [DISTWIDTH] IN BLOOD BY AUTOMATED COUNT: 55.3 FL (ref 35.9–50)
ERYTHROCYTE [DISTWIDTH] IN BLOOD BY AUTOMATED COUNT: 55.8 FL (ref 35.9–50)
FIBRINOGEN PPP-MCNC: 261 MG/DL (ref 215–460)
FIBRINOGEN PPP-MCNC: 269 MG/DL (ref 215–460)
FIBRINOGEN PPP-MCNC: 316 MG/DL (ref 215–460)
FIBRINOGEN PPP-MCNC: 395 MG/DL (ref 215–460)
GFR SERPLBLD CREATININE-BSD FMLA CKD-EPI: 101 ML/MIN/1.73 M 2
GFR SERPLBLD CREATININE-BSD FMLA CKD-EPI: 111 ML/MIN/1.73 M 2
GFR SERPLBLD CREATININE-BSD FMLA CKD-EPI: 126 ML/MIN/1.73 M 2
GFR SERPLBLD CREATININE-BSD FMLA CKD-EPI: 134 ML/MIN/1.73 M 2
GLOBULIN SER CALC-MCNC: 1.4 G/DL (ref 1.9–3.5)
GLUCOSE BLD STRIP.AUTO-MCNC: 135 MG/DL (ref 65–99)
GLUCOSE BLD STRIP.AUTO-MCNC: 147 MG/DL (ref 65–99)
GLUCOSE BLD STRIP.AUTO-MCNC: 148 MG/DL (ref 65–99)
GLUCOSE BLD STRIP.AUTO-MCNC: 173 MG/DL (ref 65–99)
GLUCOSE BLD STRIP.AUTO-MCNC: 67 MG/DL (ref 65–99)
GLUCOSE BLD STRIP.AUTO-MCNC: 74 MG/DL (ref 65–99)
GLUCOSE SERPL-MCNC: 139 MG/DL (ref 65–99)
GLUCOSE SERPL-MCNC: 164 MG/DL (ref 65–99)
GLUCOSE SERPL-MCNC: 51 MG/DL (ref 65–99)
GLUCOSE SERPL-MCNC: 71 MG/DL (ref 65–99)
HCO3 BLDV-SCNC: 18 MMOL/L (ref 24–28)
HCO3 BLDV-SCNC: 25 MMOL/L (ref 24–28)
HCT VFR BLD AUTO: 19.2 % (ref 42–52)
HCT VFR BLD AUTO: 21.6 % (ref 42–52)
HCT VFR BLD AUTO: 24.5 % (ref 42–52)
HCT VFR BLD AUTO: 27.8 % (ref 42–52)
HGB BLD-MCNC: 6.9 G/DL (ref 14–18)
HGB BLD-MCNC: 7.6 G/DL (ref 14–18)
HGB BLD-MCNC: 8.6 G/DL (ref 14–18)
HGB BLD-MCNC: 9.8 G/DL (ref 14–18)
INR PPP: 1.56 (ref 0.87–1.13)
INR PPP: 1.68 (ref 0.87–1.13)
INR PPP: 1.72 (ref 0.87–1.13)
INR PPP: 1.77 (ref 0.87–1.13)
LACTATE SERPL-SCNC: 2.2 MMOL/L (ref 0.5–2)
LACTATE SERPL-SCNC: 3.5 MMOL/L (ref 0.5–2)
LACTATE SERPL-SCNC: 4.6 MMOL/L (ref 0.5–2)
LYMPHOCYTES # BLD AUTO: 0.19 K/UL (ref 1–4.8)
LYMPHOCYTES NFR BLD: 97.4 % (ref 22–41)
MACROCYTES BLD QL SMEAR: ABNORMAL
MANUAL DIFF BLD: NORMAL
MCF BLD TEG: 49.5 MM (ref 52–69)
MCF BLD TEG: 50.8 MM (ref 52–69)
MCF.PLATELET INHIB BLD ROTEM: 17.7 MM (ref 15–32)
MCF.PLATELET INHIB BLD ROTEM: 23.6 MM (ref 15–32)
MCH RBC QN AUTO: 30.5 PG (ref 27–33)
MCH RBC QN AUTO: 30.6 PG (ref 27–33)
MCH RBC QN AUTO: 30.8 PG (ref 27–33)
MCH RBC QN AUTO: 31.1 PG (ref 27–33)
MCHC RBC AUTO-ENTMCNC: 35.1 G/DL (ref 33.7–35.3)
MCHC RBC AUTO-ENTMCNC: 35.2 G/DL (ref 33.7–35.3)
MCHC RBC AUTO-ENTMCNC: 35.3 G/DL (ref 33.7–35.3)
MCHC RBC AUTO-ENTMCNC: 35.9 G/DL (ref 33.7–35.3)
MCV RBC AUTO: 85.7 FL (ref 81.4–97.8)
MCV RBC AUTO: 86.7 FL (ref 81.4–97.8)
MCV RBC AUTO: 87.2 FL (ref 81.4–97.8)
MCV RBC AUTO: 88.3 FL (ref 81.4–97.8)
MONOCYTES # BLD AUTO: 0 K/UL (ref 0–0.85)
MONOCYTES NFR BLD AUTO: 0 % (ref 0–13.4)
MORPHOLOGY BLD-IMP: NORMAL
NEUTROPHILS # BLD AUTO: 0.01 K/UL (ref 1.82–7.42)
NEUTROPHILS NFR BLD: 2.6 % (ref 44–72)
NRBC # BLD AUTO: 0.06 K/UL
NRBC # BLD AUTO: 0.08 K/UL
NRBC # BLD AUTO: 0.1 K/UL
NRBC # BLD AUTO: 0.1 K/UL
NRBC BLD-RTO: 40 /100 WBC
NRBC BLD-RTO: 47.1 /100 WBC
NRBC BLD-RTO: 75 /100 WBC
NRBC BLD-RTO: 76.9 /100 WBC
PA AA BLD-ACNC: 34.6 % (ref 0–11)
PA AA BLD-ACNC: 98.7 % (ref 0–11)
PA ADP BLD-ACNC: 33.9 % (ref 0–17)
PA ADP BLD-ACNC: 60.2 % (ref 0–17)
PCO2 BLDV: 34.4 MMHG (ref 41–51)
PCO2 BLDV: 40.5 MMHG (ref 41–51)
PCO2 TEMP ADJ BLDV: 33.4 MMHG (ref 41–51)
PH BLDV: 7.35 [PH] (ref 7.31–7.45)
PH BLDV: 7.4 [PH] (ref 7.31–7.45)
PH TEMP ADJ BLDV: 7.36 [PH] (ref 7.31–7.45)
PLATELET # BLD AUTO: 14 K/UL (ref 164–446)
PLATELET # BLD AUTO: 25 K/UL (ref 164–446)
PLATELET # BLD AUTO: 37 K/UL (ref 164–446)
PLATELET # BLD AUTO: 72 K/UL (ref 164–446)
PLATELET BLD QL SMEAR: NORMAL
PMV BLD AUTO: 10.5 FL (ref 9–12.9)
PMV BLD AUTO: 10.7 FL (ref 9–12.9)
PMV BLD AUTO: 11.4 FL (ref 9–12.9)
PMV BLD AUTO: 11.6 FL (ref 9–12.9)
PO2 BLDV: 39 MMHG (ref 25–40)
PO2 BLDV: 40.3 MMHG (ref 25–40)
PO2 TEMP ADJ BLDV: 37.1 MMHG (ref 25–40)
POTASSIUM SERPL-SCNC: 3.5 MMOL/L (ref 3.6–5.5)
POTASSIUM SERPL-SCNC: 3.8 MMOL/L (ref 3.6–5.5)
POTASSIUM SERPL-SCNC: 3.9 MMOL/L (ref 3.6–5.5)
POTASSIUM SERPL-SCNC: 4.7 MMOL/L (ref 3.6–5.5)
PRODUCT TYPE UPROD: NORMAL
PROT SERPL-MCNC: 3.8 G/DL (ref 6–8.2)
PROTHROMBIN TIME: 18.4 SEC (ref 12–14.6)
PROTHROMBIN TIME: 19.4 SEC (ref 12–14.6)
PROTHROMBIN TIME: 19.7 SEC (ref 12–14.6)
PROTHROMBIN TIME: 20.2 SEC (ref 12–14.6)
RBC # BLD AUTO: 2.24 M/UL (ref 4.7–6.1)
RBC # BLD AUTO: 2.49 M/UL (ref 4.7–6.1)
RBC # BLD AUTO: 2.81 M/UL (ref 4.7–6.1)
RBC # BLD AUTO: 3.15 M/UL (ref 4.7–6.1)
RBC BLD AUTO: PRESENT
SAO2 % BLDV: 68.7 %
SAO2 % BLDV: 72.1 %
SCCMEC + MECA PNL NOSE NAA+PROBE: NEGATIVE
SCCMEC + MECA PNL NOSE NAA+PROBE: NEGATIVE
SMUDGE CELLS BLD QL SMEAR: NORMAL
SODIUM SERPL-SCNC: 136 MMOL/L (ref 135–145)
SODIUM SERPL-SCNC: 142 MMOL/L (ref 135–145)
SODIUM SERPL-SCNC: 143 MMOL/L (ref 135–145)
SODIUM SERPL-SCNC: 144 MMOL/L (ref 135–145)
TEG ALGORITHM TGALG: ABNORMAL
TEG ALGORITHM TGALG: ABNORMAL
TROPONIN T SERPL-MCNC: 1016 NG/L (ref 6–19)
TROPONIN T SERPL-MCNC: 403 NG/L (ref 6–19)
TROPONIN T SERPL-MCNC: 580 NG/L (ref 6–19)
TROPONIN T SERPL-MCNC: 994 NG/L (ref 6–19)
UNIT STATUS USTAT: NORMAL
VANCOMYCIN SERPL-MCNC: 9.1 UG/ML
WBC # BLD AUTO: 0.1 K/UL (ref 4.8–10.8)
WBC # BLD AUTO: 0.1 K/UL (ref 4.8–10.8)
WBC # BLD AUTO: 0.2 K/UL (ref 4.8–10.8)
WBC # BLD AUTO: 0.3 K/UL (ref 4.8–10.8)

## 2022-12-28 PROCEDURE — 85610 PROTHROMBIN TIME: CPT | Mod: 91

## 2022-12-28 PROCEDURE — 71045 X-RAY EXAM CHEST 1 VIEW: CPT

## 2022-12-28 PROCEDURE — 700105 HCHG RX REV CODE 258: Performed by: STUDENT IN AN ORGANIZED HEALTH CARE EDUCATION/TRAINING PROGRAM

## 2022-12-28 PROCEDURE — 700105 HCHG RX REV CODE 258: Performed by: EMERGENCY MEDICINE

## 2022-12-28 PROCEDURE — 82330 ASSAY OF CALCIUM: CPT | Mod: 91

## 2022-12-28 PROCEDURE — 700111 HCHG RX REV CODE 636 W/ 250 OVERRIDE (IP): Performed by: STUDENT IN AN ORGANIZED HEALTH CARE EDUCATION/TRAINING PROGRAM

## 2022-12-28 PROCEDURE — P9016 RBC LEUKOCYTES REDUCED: HCPCS

## 2022-12-28 PROCEDURE — C9113 INJ PANTOPRAZOLE SODIUM, VIA: HCPCS | Performed by: STUDENT IN AN ORGANIZED HEALTH CARE EDUCATION/TRAINING PROGRAM

## 2022-12-28 PROCEDURE — 85025 COMPLETE CBC W/AUTO DIFF WBC: CPT | Mod: 91

## 2022-12-28 PROCEDURE — 99292 CRITICAL CARE ADDL 30 MIN: CPT | Mod: GC | Performed by: INTERNAL MEDICINE

## 2022-12-28 PROCEDURE — 99291 CRITICAL CARE FIRST HOUR: CPT | Mod: GC | Performed by: INTERNAL MEDICINE

## 2022-12-28 PROCEDURE — 700101 HCHG RX REV CODE 250: Performed by: STUDENT IN AN ORGANIZED HEALTH CARE EDUCATION/TRAINING PROGRAM

## 2022-12-28 PROCEDURE — P9017 PLASMA 1 DONOR FRZ W/IN 8 HR: HCPCS

## 2022-12-28 PROCEDURE — 85384 FIBRINOGEN ACTIVITY: CPT | Mod: 91

## 2022-12-28 PROCEDURE — 86923 COMPATIBILITY TEST ELECTRIC: CPT

## 2022-12-28 PROCEDURE — 36430 TRANSFUSION BLD/BLD COMPNT: CPT

## 2022-12-28 PROCEDURE — 700111 HCHG RX REV CODE 636 W/ 250 OVERRIDE (IP): Performed by: INTERNAL MEDICINE

## 2022-12-28 PROCEDURE — 80048 BASIC METABOLIC PNL TOTAL CA: CPT | Mod: 91

## 2022-12-28 PROCEDURE — 86945 BLOOD PRODUCT/IRRADIATION: CPT | Mod: 91

## 2022-12-28 PROCEDURE — 99233 SBSQ HOSP IP/OBS HIGH 50: CPT | Performed by: PEDIATRICS

## 2022-12-28 PROCEDURE — 770022 HCHG ROOM/CARE - ICU (200)

## 2022-12-28 PROCEDURE — 82803 BLOOD GASES ANY COMBINATION: CPT | Mod: 91

## 2022-12-28 PROCEDURE — 80202 ASSAY OF VANCOMYCIN: CPT

## 2022-12-28 PROCEDURE — 85007 BL SMEAR W/DIFF WBC COUNT: CPT

## 2022-12-28 PROCEDURE — 99223 1ST HOSP IP/OBS HIGH 75: CPT | Performed by: INTERNAL MEDICINE

## 2022-12-28 PROCEDURE — 85730 THROMBOPLASTIN TIME PARTIAL: CPT | Mod: 91

## 2022-12-28 PROCEDURE — 700111 HCHG RX REV CODE 636 W/ 250 OVERRIDE (IP): Performed by: EMERGENCY MEDICINE

## 2022-12-28 PROCEDURE — 85347 COAGULATION TIME ACTIVATED: CPT

## 2022-12-28 PROCEDURE — 80053 COMPREHEN METABOLIC PANEL: CPT

## 2022-12-28 PROCEDURE — 83605 ASSAY OF LACTIC ACID: CPT | Mod: 91

## 2022-12-28 PROCEDURE — 700105 HCHG RX REV CODE 258: Performed by: INTERNAL MEDICINE

## 2022-12-28 PROCEDURE — 84484 ASSAY OF TROPONIN QUANT: CPT | Mod: 91

## 2022-12-28 PROCEDURE — 82962 GLUCOSE BLOOD TEST: CPT

## 2022-12-28 PROCEDURE — 700102 HCHG RX REV CODE 250 W/ 637 OVERRIDE(OP): Performed by: STUDENT IN AN ORGANIZED HEALTH CARE EDUCATION/TRAINING PROGRAM

## 2022-12-28 PROCEDURE — C9113 INJ PANTOPRAZOLE SODIUM, VIA: HCPCS | Performed by: INTERNAL MEDICINE

## 2022-12-28 PROCEDURE — 700101 HCHG RX REV CODE 250: Performed by: EMERGENCY MEDICINE

## 2022-12-28 PROCEDURE — 93010 ELECTROCARDIOGRAM REPORT: CPT | Performed by: INTERNAL MEDICINE

## 2022-12-28 PROCEDURE — 85576 BLOOD PLATELET AGGREGATION: CPT | Mod: 91

## 2022-12-28 PROCEDURE — 30233N1 TRANSFUSION OF NONAUTOLOGOUS RED BLOOD CELLS INTO PERIPHERAL VEIN, PERCUTANEOUS APPROACH: ICD-10-PCS | Performed by: PEDIATRICS

## 2022-12-28 PROCEDURE — P9034 PLATELETS, PHERESIS: HCPCS

## 2022-12-28 RX ORDER — DEXTROSE MONOHYDRATE 100 MG/ML
INJECTION, SOLUTION INTRAVENOUS CONTINUOUS
Status: DISCONTINUED | OUTPATIENT
Start: 2022-12-28 | End: 2022-12-28

## 2022-12-28 RX ORDER — PANTOPRAZOLE SODIUM 40 MG/10ML
40 INJECTION, POWDER, LYOPHILIZED, FOR SOLUTION INTRAVENOUS 2 TIMES DAILY
Status: DISCONTINUED | OUTPATIENT
Start: 2022-12-28 | End: 2022-12-28

## 2022-12-28 RX ORDER — FUROSEMIDE 10 MG/ML
40 INJECTION INTRAMUSCULAR; INTRAVENOUS ONCE
Status: COMPLETED | OUTPATIENT
Start: 2022-12-28 | End: 2022-12-28

## 2022-12-28 RX ORDER — POTASSIUM CHLORIDE 29.8 MG/ML
40 INJECTION INTRAVENOUS ONCE
Status: COMPLETED | OUTPATIENT
Start: 2022-12-28 | End: 2022-12-28

## 2022-12-28 RX ADMIN — HYDROMORPHONE HYDROCHLORIDE 0.6 MG: 1 INJECTION, SOLUTION INTRAMUSCULAR; INTRAVENOUS; SUBCUTANEOUS at 21:30

## 2022-12-28 RX ADMIN — HYDROMORPHONE HYDROCHLORIDE 0.6 MG: 1 INJECTION, SOLUTION INTRAMUSCULAR; INTRAVENOUS; SUBCUTANEOUS at 19:23

## 2022-12-28 RX ADMIN — POTASSIUM CHLORIDE 40 MEQ: 40 INJECTION INTRAVENOUS at 18:06

## 2022-12-28 RX ADMIN — HYDROCORTISONE SODIUM SUCCINATE 50 MG: 100 INJECTION, POWDER, FOR SOLUTION INTRAMUSCULAR; INTRAVENOUS at 17:19

## 2022-12-28 RX ADMIN — HYDROCORTISONE SODIUM SUCCINATE 100 MG: 100 INJECTION, POWDER, FOR SOLUTION INTRAMUSCULAR; INTRAVENOUS at 09:19

## 2022-12-28 RX ADMIN — INSULIN HUMAN 1 UNITS: 100 INJECTION, SOLUTION PARENTERAL at 23:51

## 2022-12-28 RX ADMIN — VASOPRESSIN 0.03 UNITS/MIN: 20 INJECTION, SOLUTION INTRAMUSCULAR; SUBCUTANEOUS at 04:39

## 2022-12-28 RX ADMIN — NOREPINEPHRINE BITARTRATE 0.4 MCG/KG/MIN: 1 INJECTION, SOLUTION, CONCENTRATE INTRAVENOUS at 05:20

## 2022-12-28 RX ADMIN — CEFEPIME 2 G: 2 INJECTION, POWDER, FOR SOLUTION INTRAVENOUS at 06:07

## 2022-12-28 RX ADMIN — ONDANSETRON 4 MG: 2 INJECTION INTRAMUSCULAR; INTRAVENOUS at 18:25

## 2022-12-28 RX ADMIN — HYDROMORPHONE HYDROCHLORIDE 0.4 MG: 1 INJECTION, SOLUTION INTRAMUSCULAR; INTRAVENOUS; SUBCUTANEOUS at 09:19

## 2022-12-28 RX ADMIN — HYDROCORTISONE SODIUM SUCCINATE 100 MG: 100 INJECTION, POWDER, FOR SOLUTION INTRAMUSCULAR; INTRAVENOUS at 00:25

## 2022-12-28 RX ADMIN — HYDROMORPHONE HYDROCHLORIDE 0.6 MG: 1 INJECTION, SOLUTION INTRAMUSCULAR; INTRAVENOUS; SUBCUTANEOUS at 23:37

## 2022-12-28 RX ADMIN — DEXTROSE MONOHYDRATE: 100 INJECTION, SOLUTION INTRAVENOUS at 03:36

## 2022-12-28 RX ADMIN — CEFEPIME 2 G: 2 INJECTION, POWDER, FOR SOLUTION INTRAVENOUS at 21:54

## 2022-12-28 RX ADMIN — NOREPINEPHRINE BITARTRATE 0.08 MCG/KG/MIN: 1 INJECTION, SOLUTION, CONCENTRATE INTRAVENOUS at 18:07

## 2022-12-28 RX ADMIN — DEXTROSE MONOHYDRATE 25 G: 100 INJECTION, SOLUTION INTRAVENOUS at 03:16

## 2022-12-28 RX ADMIN — PANTOPRAZOLE SODIUM 40 MG: 40 INJECTION, POWDER, LYOPHILIZED, FOR SOLUTION INTRAVENOUS at 11:32

## 2022-12-28 RX ADMIN — PANTOPRAZOLE SODIUM 8 MG/HR: 40 INJECTION, POWDER, FOR SOLUTION INTRAVENOUS at 18:08

## 2022-12-28 RX ADMIN — VASOPRESSIN 0.03 UNITS/MIN: 20 INJECTION, SOLUTION INTRAMUSCULAR; SUBCUTANEOUS at 17:25

## 2022-12-28 RX ADMIN — CEFEPIME 2 G: 2 INJECTION, POWDER, FOR SOLUTION INTRAVENOUS at 14:17

## 2022-12-28 RX ADMIN — FUROSEMIDE 40 MG: 10 INJECTION, SOLUTION INTRAMUSCULAR; INTRAVENOUS at 00:25

## 2022-12-28 RX ADMIN — HYDROCORTISONE SODIUM SUCCINATE 50 MG: 100 INJECTION, POWDER, FOR SOLUTION INTRAMUSCULAR; INTRAVENOUS at 23:37

## 2022-12-28 RX ADMIN — DEXMEDETOMIDINE 0.6 MCG/KG/HR: 200 INJECTION, SOLUTION INTRAVENOUS at 08:03

## 2022-12-28 RX ADMIN — CALCIUM CHLORIDE 1000 MG: 100 INJECTION, SOLUTION INTRAVENOUS at 17:42

## 2022-12-28 RX ADMIN — THIAMINE HYDROCHLORIDE 500 MG: 100 INJECTION, SOLUTION INTRAMUSCULAR; INTRAVENOUS at 17:29

## 2022-12-28 RX ADMIN — PANTOPRAZOLE SODIUM 8 MG/HR: 40 INJECTION, POWDER, FOR SOLUTION INTRAVENOUS at 04:40

## 2022-12-28 ASSESSMENT — COGNITIVE AND FUNCTIONAL STATUS - GENERAL
MOVING TO AND FROM BED TO CHAIR: A LITTLE
TOILETING: A LOT
EATING MEALS: A LITTLE
DRESSING REGULAR UPPER BODY CLOTHING: A LITTLE
SUGGESTED CMS G CODE MODIFIER MOBILITY: CL
TURNING FROM BACK TO SIDE WHILE IN FLAT BAD: A LITTLE
MOBILITY SCORE: 14
DRESSING REGULAR LOWER BODY CLOTHING: A LOT
DAILY ACTIVITIY SCORE: 15
PERSONAL GROOMING: A LITTLE
STANDING UP FROM CHAIR USING ARMS: A LOT
SUGGESTED CMS G CODE MODIFIER DAILY ACTIVITY: CK
MOVING FROM LYING ON BACK TO SITTING ON SIDE OF FLAT BED: A LOT
HELP NEEDED FOR BATHING: A LOT
CLIMB 3 TO 5 STEPS WITH RAILING: A LOT
WALKING IN HOSPITAL ROOM: A LOT

## 2022-12-28 ASSESSMENT — ENCOUNTER SYMPTOMS
DIZZINESS: 0
HEADACHES: 0
BLURRED VISION: 0
DEPRESSION: 0
SHORTNESS OF BREATH: 0
SENSORY CHANGE: 0
VOMITING: 1
NAUSEA: 1
CHILLS: 0
COUGH: 0
FLANK PAIN: 0
DOUBLE VISION: 0
BACK PAIN: 1
SPEECH CHANGE: 0
PALPITATIONS: 0

## 2022-12-28 ASSESSMENT — PAIN DESCRIPTION - PAIN TYPE
TYPE: ACUTE PAIN

## 2022-12-28 ASSESSMENT — LIFESTYLE VARIABLES: SUBSTANCE_ABUSE: 0

## 2022-12-28 ASSESSMENT — FIBROSIS 4 INDEX: FIB4 SCORE: 4.51

## 2022-12-28 NOTE — ASSESSMENT & PLAN NOTE
-Upon admission patient noted to vomit almost a liter of blood  -Opinion is that this is likely from hemorrhagic gastritis likely in setting of recent high dose steroids with chemotherapy  -Also notable that patient was recently on steroids for 2 weeks, but was taking prophylactic H2 blockers  -Per GI opinion, no benefit in endoscopy until more clinically stable  -Treat DIC and hemorrhagic shock as above  -IV Protonix now off drip, continue BID

## 2022-12-28 NOTE — ASSESSMENT & PLAN NOTE
-Hemorrhagic shock from DIC  -Hemorrhagic gastritis evident  -NG tube placement for suction  -BID CBC, transfuse for hemoglobin less than 7, transfuse for platelets less than 20  -Every 4 hour PT/PTT/INR, transfuse FFP for INR greater than 2 if ongoing active bleeding is evident  -If evidence of developing volume overload, low threshold to resume IV Lasix   -GI has been consulted, there is no benefit from an endoscopic interventions and hemorrhagic gastritis from DIC  -IV Protonix BID  -Off of vasopressor support   -Continue stress dose steroids

## 2022-12-28 NOTE — ASSESSMENT & PLAN NOTE
Recent steroids for the last 2 weeks  Currently on stress dose steroids  -LDSSI plus hypoglycemia protocol

## 2022-12-28 NOTE — CONSULTS
Critical Care Consultation    Date of consult: 12/27/2022    Referring Physician  Mely Hammond M.D.    Reason for Consultation  Pancytopenia, DIC    History of Presenting Illness  22 y.o. male who presented 12/27/2022 with pancytopenia and hemorrhagic shock.      Patient has a history of B cell acute lymphoblastic leukemia.  He has been on imatinib therapy since June 2022, and last received chemotherapy on 12/20/2022, which included vincristine, doxorubicin, Dexrazoxane, and PEG-aspargase.  He was then placed on a 7-day course of dexamethasone 9 mg oral twice daily, and did take famotidine for GI prophylaxis with this.  He also continued taking his imatinib.  And due to being at risk for thrombotic events from the PEG-aspargase, he was restarted 2 weeks ago on apixaban 5 mg p.o. twice daily which she was taking at home up until presentation.     Patient started noticing onset of vomiting about a week ago after his most recent chemotherapy treatment.  He developed progressive lightheadedness and palpitations and chest pain.  Today patient had a syncopal episode and had minor superficial trauma that started bleeding.  He was brought in by EMS with significant tachycardia with heart rate in the 180s, that reduced to have 140s with a dose of adenosine and was noted to actually be sinus tachycardia.  As he was severely hypovolemic, anemic, and thrombocytopenic.  His heart rate has quickly risen back to the 180s and remains in sinus tachycardia as compensation for his shock.  This is also evidenced by his echo, which is hyperdynamic LVEF in the setting of significant sinus tachycardia and shock.     Initial vitals in the ED included blood pressure 54/31 and heart rate in the 170s to 180s.  He was started on Levophed infusion and given IV fluid boluses which improved his mean arterial pressure.  However he remains very significantly tachycardic.    On bedside assessment, he is alert and oriented x4, mentating  appropriately, and he states that his primary symptoms at the moment are chest pain, nausea, palpitations, and bilateral leg cramping.    Reviewed labs: Notable for lactic acid 18.1, WBC count of 0.3 with undetectable absolute neutrophils, hemoglobin 6.3, and platelets of 12.  Follow-up labs noted hemoglobin of 4.4 and platelets of 6, although some of this was likely delusional from all of the crystalloid infusion that he got.  Troponin was 85.  Procalcitonin was 2.33.  Potassium was 6.4, and bicarb 8, with anion gap of 28, blood glucose of 311, and BUN/creatinine of 44/1.29.  Urinalysis was negative for ketones and beta hydroxybutyrate was 0.18.  PT/PTT/INR were all elevated, and fibrinogen levels were undetectable.  Total bilirubin of 0.6, also was not elevated, not suggestive of hemolysis, although remainder of hemolysis work-up has been ordered.    Hematology/oncology consulted.  This presentation is most consistent with DIC.  Patient did vomit a liter of blood from his stomach.  Gastroenterology was notified, and opinion is that this is likely hemorrhagic gastritis from DIC and that there is no benefit in endoscopic intervention.    Flu/COVID/RSV negative, urinalysis does not look infectious, urine culture, blood culture have all been sent.    Chest x-ray was normal, CT head was normal, CT maxillofacial was normal, and CT abdomen/pelvis did not demonstrate any pancreatitis, enteritis, colitis, or any abdominal abscess or other source of infection.    Patient has not had any fevers, but given his very significant clinical situation, is being treated with broad-spectrum antibiotics at this time.      Code Status  Prior    Review of Systems  Review of Systems   Constitutional:  Positive for malaise/fatigue. Negative for fever.   HENT:  Positive for congestion and sinus pain.    Respiratory:  Positive for shortness of breath.    Cardiovascular:  Positive for chest pain and palpitations.   Gastrointestinal:   Positive for nausea and vomiting. Negative for abdominal pain and diarrhea.   Genitourinary:  Negative for dysuria and urgency.   Musculoskeletal:  Positive for myalgias.   Neurological:  Positive for dizziness and loss of consciousness.     Past Medical History   has a past medical history of Cancer (HCC) and Leukoma.    Surgical History   has a past surgical history that includes cath placement (Right, 8/29/2022).    Family History  family history includes Diabetes in his paternal grandfather; Hyperlipidemia in his paternal grandfather; Hypertension in his paternal grandfather; No Known Problems in his mother; Stroke in his maternal grandmother.    Social History   reports that he has never smoked. He has never used smokeless tobacco. He reports that he does not drink alcohol and does not use drugs.    Medications  Home Medications       Reviewed by Verna Morataya (Pharmacy Tech) on 12/27/22 at 1413  Med List Status: Complete     Medication Last Dose Status   apixaban (ELIQUIS) 5mg Tab 12/27/2022 Active   chlorhexidine (PERIDEX) 0.12 % Solution 12/27/2022 Active   dexamethasone (DECADRON) 1.5 MG Tab 12/26/2022 Active   dexamethasone (DECADRON) 6 MG Tab 12/26/2022 Active   famotidine (PEPCID) 20 MG Tab 12/27/2022 Active   imatinib (GLEEVEC) 100 MG tablet 12/26/2022 Active   imatinib (GLEEVEC) 400 MG tablet 12/27/2022 Active   ondansetron (ZOFRAN ODT) 8 MG TABLET DISPERSIBLE PRN Active   sulfamethoxazole-trimethoprim (BACTRIM) 400-80 MG Tab 12/25/2022 Active   therapeutic multivitamin-minerals (THERAGRAN-M) Tab 2 weeks ago Active   vitamin D3 (CHOLECALCIFEROL) 1000 Unit (25 mcg) Tab 2 weeks ago Active                  Current Facility-Administered Medications   Medication Dose Route Frequency Provider Last Rate Last Admin    norepinephrine (Levophed) 8 mg in 250 mL NS infusion (premix)  0-1 mcg/kg/min (Ideal) Intravenous Continuous Mely Hammond M.D. 11.8 mL/hr at 12/27/22 1908 0.1 mcg/kg/min at 12/27/22 1908     MD Alert...Vancomycin per Pharmacy   Other PHARMACY TO DOSE Caren Apodaca M.D.        vancomycin (VANCOCIN) 2,000 mg in  mL IVPB  25 mg/kg Intravenous Once Mely Hammond M.D. 167 mL/hr at 12/27/22 1716 Rate Verify at 12/27/22 1716    hydrocortisone sodium succinate PF (Solu-CORTEF) 100 MG injection 100 mg  100 mg Intravenous Q8HR Caren Apodaca M.D.   100 mg at 12/27/22 1621    vasopressin (Vasostrict) 20 Units in  mL Infusion  0.03 Units/min Intravenous Continuous Mely Hammond M.D. 9 mL/hr at 12/27/22 1909 0.03 Units/min at 12/27/22 1909    cefepime (Maxipime) 2 g in  mL IVPB  2 g Intravenous Q8HRS Caren Apodaca M.D.        thiamine (B-1) 500 mg in dextrose 5% 100 mL IVPB  500 mg Intravenous DAILY Caren Apodaca M.D.        NS infusion   Intravenous Continuous Caren Apodaca M.D. 150 mL/hr at 12/27/22 1734 New Bag at 12/27/22 1734    pantoprazole (Protonix) 80 mg in  mL Infusion  8 mg/hr Intravenous Continuous Caren Apodaca M.D. 25 mL/hr at 12/27/22 1909 8 mg/hr at 12/27/22 1909    FENTANYL CITRATE (PF) 0.05 MG/ML INJ SOLN (WRAPPED)             ondansetron (ZOFRAN) syringe/vial injection 4 mg  4 mg Intravenous Q6HRS PRN Caren Apodaca M.D.   4 mg at 12/27/22 1748    NS infusion   Intravenous Continuous Erum Olivares M.D.        HYDROmorphone (Dilaudid) injection 0.4 mg  0.4 mg Intravenous Q2HRS PRN Caren Apodaca M.D.        Or    HYDROmorphone (Dilaudid) injection 0.6 mg  0.6 mg Intravenous Q2HRS PRN Caren Apodaca M.D.        dexmedetomidine (PRECEDEX) 400 mcg/100mL NS premix infusion  0.1-1.5 mcg/kg/hr (Ideal) Intravenous Continuous Caren Apodaca M.D. 7.8 mL/hr at 12/27/22 1908 0.5 mcg/kg/hr at 12/27/22 1908    insulin regular (HumuLIN R,NovoLIN R) injection  1-6 Units Subcutaneous Q6HRS Erum Olivares M.D.        And    dextrose 10 % BOLUS 25 g  25 g Intravenous Q15 MIN PRN Erum Olivares M.D.        calcium GLUConate 2 g in NaCl  IVPB premix  2 g Intravenous Once Erum Olivares M.D.           Allergies  Allergies   Allergen Reactions    Amoxicillin Rash     Reacted as an infant. No swelling or airway problems.  Tolerated cefepime June 2022       Vital Signs last 24 hours  Temp:  [36.4 °C (97.6 °F)-37.6 °C (99.7 °F)] 36.7 °C (98.1 °F)  Pulse:  [141-179] 141  Resp:  [21-50] 23  BP: ()/(35-90) 128/64  SpO2:  [93 %-100 %] 95 %    Physical Exam  Physical Exam  Constitutional:       General: He is in acute distress.      Appearance: He is normal weight. He is ill-appearing and diaphoretic.      Comments: Tachypneic, ill-appearing   HENT:      Head: Normocephalic and atraumatic.      Right Ear: External ear normal.      Left Ear: External ear normal.      Nose: Nose normal. No congestion.      Comments: Nares with coagulated blood     Mouth/Throat:      Mouth: Mucous membranes are dry.      Pharynx: No oropharyngeal exudate.      Comments: Oral cavity with coagulated blood  Eyes:      General: No scleral icterus.     Extraocular Movements: Extraocular movements intact.      Pupils: Pupils are equal, round, and reactive to light.   Cardiovascular:      Rate and Rhythm: Regular rhythm. Tachycardia present.      Pulses: Normal pulses.      Heart sounds: Murmur heard.      Comments: Systolic ejection murmur audible at left sternal border  Pulmonary:      Breath sounds: Normal breath sounds.      Comments: Tachypneic.  Clear lungs  Abdominal:      General: There is no distension.      Palpations: Abdomen is soft.      Tenderness: There is no abdominal tenderness. There is no guarding or rebound.   Musculoskeletal:         General: Normal range of motion.      Cervical back: Normal range of motion and neck supple.      Right lower leg: No edema.      Left lower leg: No edema.   Skin:     General: Skin is warm.      Capillary Refill: Capillary refill takes 2 to 3 seconds.      Coloration: Skin is pale.   Neurological:      General: No focal  deficit present.      Mental Status: He is alert and oriented to person, place, and time.      Cranial Nerves: No cranial nerve deficit.   Psychiatric:         Mood and Affect: Mood normal.         Thought Content: Thought content normal.       Fluids    Intake/Output Summary (Last 24 hours) at 2022  Last data filed at 2022 1815  Gross per 24 hour   Intake 1065.06 ml   Output 1450 ml   Net -384.94 ml       Laboratory  Recent Results (from the past 48 hour(s))   EKG    Collection Time: 22  1:25 PM   Result Value Ref Range    Report       [facility] Emergency Dept.    Test Date:  2022  Pt Name:    REBECA BRODERICKAROSA    Department: ER  MRN:        [id number]                      Room:       [facility]  Gender:     Male                         Technician: [id number]  :        2000                   Requested By:MELY SETH  Order #:    [id number]                    Reading MD: Mely Seth MD    Measurements  Intervals                                Axis  Rate:       169                          P:          0  GA:         77                           QRS:        33  QRSD:       121                          T:          81  QT:         277  QTc:        465    Interpretive Statements  Sinus tachycardia  Nonspecific intraventricular conduction delay  ST elevation, consider anterior injury  Compared to ECG 2022 10:07:29  Intraventricular conduction delay now present  ST (T wave) deviation now present  Myocardial infarct finding now present  Early repolarization no longer present  Electro nically Signed On 2022 16:36:32 PST by Mely Seth MD     EKG    Collection Time: 22  1:37 PM   Result Value Ref Range    Report       [facility] Emergency Dept.    Test Date:  2022  Pt Name:    REBECA CHEEMA    Department: ER  MRN:        [id number]                      Room:       [facility]  Gender:     Male                          Technician: [id number]  :        2000                   Requested By:MELY HAMMOND  Order #:    [id number]                    Reading MD: Mely Hammond MD    Measurements  Intervals                                Axis  Rate:       149                          P:          -17  NH:         83                           QRS:        40  QRSD:       109                          T:          57  QT:         304  QTc:        479    Interpretive Statements  Sinus tachycardia  RSR' in V1 or V2, probably normal variant  Borderline prolonged QT interval  Compared to ECG 2022 13:25:51  RSR' in V1 or V2 now present  Intraventricular conduction delay no longer present  ST (T wave) deviation no longer present  Myocardial infarct finding no longer present  Electro nically Signed On 2022 16:36:30 PST by Mely Hammond MD     LACTIC ACID    Collection Time: 22  1:46 PM   Result Value Ref Range    Lactic Acid 18.1 (HH) 0.5 - 2.0 mmol/L   CBC WITH DIFFERENTIAL    Collection Time: 22  1:46 PM   Result Value Ref Range    WBC 0.3 (LL) 4.8 - 10.8 K/uL    RBC 1.85 (L) 4.70 - 6.10 M/uL    Hemoglobin 6.3 (L) 14.0 - 18.0 g/dL    Hematocrit 19.7 (L) 42.0 - 52.0 %    .5 (H) 81.4 - 97.8 fL    MCH 34.1 (H) 27.0 - 33.0 pg    MCHC 32.0 (L) 33.7 - 35.3 g/dL    RDW 63.3 (H) 35.9 - 50.0 fL    Platelet Count 12 (LL) 164 - 446 K/uL    Neutrophils-Polys 0.00 (L) 44.00 - 72.00 %    Lymphocytes 96.10 (H) 22.00 - 41.00 %    Monocytes 3.90 0.00 - 13.40 %    Eosinophils 0.00 0.00 - 6.90 %    Basophils 0.00 0.00 - 1.80 %    Nucleated RBC 11.10 /100 WBC    Neutrophils (Absolute) 0.00 (LL) 1.82 - 7.42 K/uL    Lymphs (Absolute) 0.29 (L) 1.00 - 4.80 K/uL    Monos (Absolute) 0.01 0.00 - 0.85 K/uL    Eos (Absolute) 0.00 0.00 - 0.51 K/uL    Baso (Absolute) 0.00 0.00 - 0.12 K/uL    NRBC (Absolute) 0.03 K/uL    Anisocytosis 2+ (A)     Macrocytosis 1+    COMP METABOLIC PANEL    Collection Time: 22  1:46 PM   Result Value Ref Range     Sodium 134 (L) 135 - 145 mmol/L    Potassium 6.4 (H) 3.6 - 5.5 mmol/L    Chloride 98 96 - 112 mmol/L    Co2 8 (LL) 20 - 33 mmol/L    Anion Gap 28.0 (H) 7.0 - 16.0    Glucose 311 (H) 65 - 99 mg/dL    Bun 44 (H) 8 - 22 mg/dL    Creatinine 1.29 0.50 - 1.40 mg/dL    Calcium 7.2 (L) 8.5 - 10.5 mg/dL    AST(SGOT) 46 (H) 12 - 45 U/L    ALT(SGPT) 163 (H) 2 - 50 U/L    Alkaline Phosphatase 44 30 - 99 U/L    Total Bilirubin 0.6 0.1 - 1.5 mg/dL    Albumin 1.9 (L) 3.2 - 4.9 g/dL    Total Protein 3.1 (L) 6.0 - 8.2 g/dL    Globulin 1.2 (L) 1.9 - 3.5 g/dL    A-G Ratio 1.6 g/dL   PROCALCITONIN    Collection Time: 12/27/22  1:46 PM   Result Value Ref Range    Procalcitonin 2.33 (H) <0.25 ng/mL   TROPONIN    Collection Time: 12/27/22  1:46 PM   Result Value Ref Range    Troponin T 85 (H) 6 - 19 ng/L   CORRECTED CALCIUM    Collection Time: 12/27/22  1:46 PM   Result Value Ref Range    Correct Calcium 8.9 8.5 - 10.5 mg/dL   ESTIMATED GFR    Collection Time: 12/27/22  1:46 PM   Result Value Ref Range    GFR (CKD-EPI) 81 >60 mL/min/1.73 m 2   BETA-HYDROXYBUTYRIC ACID    Collection Time: 12/27/22  1:46 PM   Result Value Ref Range    beta-Hydroxybutyric Acid 0.18 0.02 - 0.27 mmol/L   DIFFERENTIAL MANUAL    Collection Time: 12/27/22  1:46 PM   Result Value Ref Range    Manual Diff Status PERFORMED    PERIPHERAL SMEAR REVIEW    Collection Time: 12/27/22  1:46 PM   Result Value Ref Range    Peripheral Smear Review see below    PLATELET ESTIMATE    Collection Time: 12/27/22  1:46 PM   Result Value Ref Range    Plt Estimation Marked Decrease    MORPHOLOGY    Collection Time: 12/27/22  1:46 PM   Result Value Ref Range    RBC Morphology Present     Poikilocytosis 1+     Smudge Cells Moderate    IMMATURE PLT FRACTION    Collection Time: 12/27/22  1:46 PM   Result Value Ref Range    Imm. Plt Fraction 8.6 0.6 - 13.1 K/uL   POCT arterial blood gas device results    Collection Time: 12/27/22  2:20 PM   Result Value Ref Range    Ph 7.242 (LL) 7.400  - 7.500    Pco2 19.0 (L) 26.0 - 37.0 mmHg    Po2 122 (H) 64 - 87 mmHg    Tco2 <10 (L) 20 - 33 mmol/L    S02 98 93 - 99 %    Hco3 8.2 (L) 17.0 - 25.0 mmol/L    BE -18 (L) -4 - 3 mmol/L    Body Temp 36.8 C degrees    O2 Therapy 100 %    iPF Ratio 122     Ph Temp Ameena 7.244 (LL) 7.400 - 7.500    Pco2 Temp Co 18.8 (L) 26.0 - 37.0 mmHg    Po2 Temp Cor 121 (H) 64 - 87 mmHg    Specimen Arterial    POCT sodium device results    Collection Time: 12/27/22  2:20 PM   Result Value Ref Range    Istat Sodium 131 (L) 135 - 145 mmol/L   POCT potassium device results    Collection Time: 12/27/22  2:20 PM   Result Value Ref Range    Istat Potassium 5.5 3.6 - 5.5 mmol/L   POCT ionized CA device results    Collection Time: 12/27/22  2:20 PM   Result Value Ref Range    Istat Ionized Calcium 1.02 (L) 1.10 - 1.30 mmol/L   Basic Metabolic Panel    Collection Time: 12/27/22  2:48 PM   Result Value Ref Range    Sodium 135 135 - 145 mmol/L    Potassium 5.6 (H) 3.6 - 5.5 mmol/L    Chloride 104 96 - 112 mmol/L    Co2 7 (LL) 20 - 33 mmol/L    Glucose 169 (H) 65 - 99 mg/dL    Bun 39 (H) 8 - 22 mg/dL    Creatinine 1.24 0.50 - 1.40 mg/dL    Calcium 6.0 (LL) 8.5 - 10.5 mg/dL    Anion Gap 24.0 (H) 7.0 - 16.0   CBC WITH DIFFERENTIAL    Collection Time: 12/27/22  2:48 PM   Result Value Ref Range    WBC 0.2 (LL) 4.8 - 10.8 K/uL    RBC 1.26 (L) 4.70 - 6.10 M/uL    Hemoglobin 4.4 (LL) 14.0 - 18.0 g/dL    Hematocrit 13.7 (LL) 42.0 - 52.0 %    .7 (H) 81.4 - 97.8 fL    MCH 34.9 (H) 27.0 - 33.0 pg    MCHC 32.1 (L) 33.7 - 35.3 g/dL    RDW 64.8 (H) 35.9 - 50.0 fL    Platelet Count 6 (LL) 164 - 446 K/uL    Nucleated RBC 0.00 /100 WBC    NRBC (Absolute) 0.00 K/uL   LACTIC ACID    Collection Time: 12/27/22  2:48 PM   Result Value Ref Range    Lactic Acid 17.9 (HH) 0.5 - 2.0 mmol/L   COD - Adult (Type and Screen)    Collection Time: 12/27/22  2:48 PM   Result Value Ref Range    ABO Grouping Only O     Rh Grouping Only POS     Antibody Screen-Cod NEG     COMPONENT CELLULAR    Collection Time: 12/27/22  2:48 PM   Result Value Ref Range    Component R       RI                  RedBloodCellsIRR    [id number]   issued       12/27/22   17:06      Product Type Red Blood Cells IRR LR     Dispense Status issued     Unit Number (Barcoded) [id number]     Product Code (Barcoded) K1393N95     Blood Type (Barcoded) 5100     Component R       RI                  RedBloodCellsIRR    [id number]   issued       12/27/22   17:06      Product Type Red Blood Cells IRR LR     Dispense Status issued     Unit Number (Barcoded) [id number]     Product Code (Barcoded) J0217X08     Blood Type (Barcoded) 5100     Component R       R4I                 RedBloodCellsIRR    [id number]   issued       12/27/22   17:06      Product Type Red Blood Cells IRR LR  AS-3     Dispense Status issued     Unit Number (Barcoded) [id number]     Product Code (Barcoded) T3520O04     Blood Type (Barcoded) 5100     Component R       RI                  RedBloodCellsIRR    [id number]   issued       12/27/22   17:06      Product Type Red Blood Cells IRR LR     Dispense Status issued     Unit Number (Barcoded) [id number]     Product Code (Barcoded) C4398Y71     Blood Type (Barcoded) 5100    ESTIMATED GFR    Collection Time: 12/27/22  2:48 PM   Result Value Ref Range    GFR (CKD-EPI) 85 >60 mL/min/1.73 m 2   PLATELETS REQUEST    Collection Time: 12/27/22  2:59 PM   Result Value Ref Range    Component P       P74                 Plts,PheresisIRR    [id number]   issued       12/27/22   16:24      Product Type Platelets Pheresis IRR LR     Dispense Status issued     Unit Number (Barcoded) [id number]     Product Code (Barcoded) U8976F71     Blood Type (Barcoded) 7300     Component P       P75                 Plts,PheresisIRR    [id number]   issued       12/27/22   18:46      Product Type Platelets Pheresis IRR LR     Dispense Status issued     Unit Number (Barcoded) [id number]      Product Code (Barcoded) Q4307C92     Blood Type (Barcoded) 7300    CoV-2, FLU A/B, and RSV by PCR (2-4 Hours CEPHEID) : Collect NP swab in VTM    Collection Time: 12/27/22  3:00 PM    Specimen: Respirate   Result Value Ref Range    Influenza virus A RNA Negative Negative    Influenza virus B, PCR Negative Negative    RSV, PCR Negative Negative    SARS-CoV-2 by PCR NotDetected     SARS-CoV-2 Source NP Swab    URINALYSIS    Collection Time: 12/27/22  3:12 PM    Specimen: Urine   Result Value Ref Range    Color Yellow     Character Clear     Specific Gravity >=1.045 (A) <1.035    Ph 5.0 5.0 - 8.0    Glucose Negative Negative mg/dL    Ketones Negative Negative mg/dL    Protein Negative Negative mg/dL    Bilirubin Negative Negative    Urobilinogen, Urine 0.2 Negative    Nitrite Negative Negative    Leukocyte Esterase Negative Negative    Occult Blood Trace (A) Negative    Micro Urine Req Microscopic    URINE MICROSCOPIC (W/UA)    Collection Time: 12/27/22  3:12 PM   Result Value Ref Range    WBC 0-2 (A) /hpf    RBC 2-5 (A) /hpf    Bacteria Negative None /hpf    Epithelial Cells Negative /hpf    Hyaline Cast 3-5 (A) /lpf   PTT    Collection Time: 12/27/22  4:20 PM   Result Value Ref Range    APTT 42.4 (H) 24.7 - 36.0 sec   PT/INR    Collection Time: 12/27/22  4:20 PM   Result Value Ref Range    PT 31.5 (H) 12.0 - 14.6 sec    INR 3.18 (H) 0.87 - 1.13   KRISHNA (DIRECT CIELO)    Collection Time: 12/27/22  4:20 PM   Result Value Ref Range    Krishna With Anti-IgG Reagent NEG     Krishna With Anti-C3D Reagent NEG    FIBRINOGEN    Collection Time: 12/27/22  4:20 PM   Result Value Ref Range    Fibrinogen <60 (LL) 215 - 460 mg/dL   MAGNESIUM    Collection Time: 12/27/22  4:20 PM   Result Value Ref Range    Magnesium 2.1 1.5 - 2.5 mg/dL   PHOSPHORUS    Collection Time: 12/27/22  4:20 PM   Result Value Ref Range    Phosphorus 3.6 2.5 - 4.5 mg/dL   PROCALCITONIN    Collection Time: 12/27/22  4:20 PM   Result Value Ref Range    Procalcitonin  31.00 (HH) <0.25 ng/mL   LACTIC ACID    Collection Time: 12/27/22  4:20 PM   Result Value Ref Range    Lactic Acid 17.9 (HH) 0.5 - 2.0 mmol/L   FRESH FROZEN PLASMA    Collection Time: 12/27/22  5:26 PM   Result Value Ref Range    Component F       TA                  Thawed Plasma       [id number]   issued       12/27/22   17:33      Product Type Thawed Apheresis Plasma     Dispense Status issued     Unit Number (Barcoded) [id number]     Product Code (Barcoded) P4706A41     Blood Type (Barcoded) 5100     Component F       TA1                 Thawed Plasma 1     [id number]   issued       12/27/22   17:45      Product Type Thawed Apheresis Plasma 1st Cont     Dispense Status issued     Unit Number (Barcoded) [id number]     Product Code (Barcoded) E1470M49     Blood Type (Barcoded) 7300     Component F       TA                  Thawed Plasma       [id number]   issued       12/27/22   17:53      Product Type Thawed Apheresis Plasma     Dispense Status issued     Unit Number (Barcoded) [id number]     Product Code (Barcoded) W2433Y76     Blood Type (Barcoded) 6200    CRYOPRECIPITATE    Collection Time: 12/27/22  5:27 PM   Result Value Ref Range    Component Ct       CTP                 Cryoprecipitate     [id number]   issued       12/27/22   17:41      Product Type CTP     Dispense Status issued     Unit Number (Barcoded) [id number]     Product Code (Barcoded) Z9594S16     Blood Type (Barcoded) 5100     Component Ct       CTP                 Cryoprecipitate     [id number]   issued       12/27/22   18:28      Product Type CTP     Dispense Status issued     Unit Number (Barcoded) [id number]     Product Code (Barcoded) P2298N00     Blood Type (Barcoded) 5100    EC-ECHOCARDIOGRAM LTD W/O CONT    Collection Time: 12/27/22  6:35 PM   Result Value Ref Range    Left Ventrical Ejection Fraction 65        Imaging  EC-ECHOCARDIOGRAM LTD W/O CONT   Final Result      US-EXTREMITY VENOUS LOWER BILAT          CT-ABDOMEN-PELVIS WITH   Final Result      1.  Hepatic steatosis.   2.  No acute inflammatory abnormality within the abdomen or pelvis.      CT-MAXILLOFACIAL W/O PLUS RECONS   Final Result      1.  Essentially negative maxillofacial sinus CT. Small retention cyst in the alveolar recess of the right maxillary sinus of no clinical significance.      CT-HEAD W/O   Final Result      Head CT without contrast within normal limits. No evidence of acute cerebral infarction, hemorrhage or mass lesion.         DX-CHEST-PORTABLE (1 VIEW)   Final Result      No evidence of acute cardiopulmonary process.          Assessment/Plan  * Shock (HCC)- (present on admission)  Assessment & Plan  - Hemorrhagic shock from DIC  -Hemorrhagic gastritis evident  -NG tube placement for suction  -Every 4 hours CBC, transfuse for hemoglobin less than 7, transfuse for platelets less than 50 in the setting of active upper GI bleeding, and for platelets less than 20 if bleeding has tamponaded and ceased  -Every 4 hour PT/PTT/INR, transfuse FFP for INR greater than 2 if ongoing active bleeding is evident  -Follow-up teg and correct any coagulopathies  -Every 8 hours fibrinogen levels  -If evidence of developing volume overload, would begin albumin and IV Lasix   -GI has been consulted, there is no benefit from an endoscopic interventions and hemorrhagic gastritis from DIC  -IV Protonix drip  -IV fluid resuscitation  -Vasopressor support to target titrate to MAP greater than 65    Elevated troponin  Assessment & Plan  - Likely demand ischemia from severe shock  -No ischemic changes noted on EKG  -Follow-up echo  -Trend    Hypocalcemia  Assessment & Plan  - Monitor every 4 hours  -Replete on as calcium less than or equal to 1  -Monitor carefully, as patient is getting a lot of blood products which chelate and worsen hypocalcemia    Hyperkalemia  Assessment & Plan  - Noted in the setting of DIC  -Follow-up every 6 hours basic metabolic panel  -Treat  with temporizing measures  -Replete calcium    Upper GI bleed  Assessment & Plan  - Upon admission patient noted to vomit almost a liter of blood  -Paged GI  -Opinion is that this is likely from hemorrhagic gastritis  -Also notable that patient was recently on steroids for 2 weeks, but was taking prophylactic H2 blockers  -Per GI opinion, no benefit in endoscopy  -Treat DIC and hemorrhagic shock with supportive care  -NG tube to low intermittent suction  -IV Protonix drip    Neutropenic sepsis (HCC)  Assessment & Plan  This is Severe Sepsis Present on admission  SIRS criteria identified on my evaluation include: Tachycardia, with heart rate greater than 90 BPM, Tachypnea, with respirations greater than 20 per minute and Leukopenia, with WBC less than 4,000  Clinical indicators of end organ dysfunction include Systolic blood pressure (SBP) <90 mmHg or mean arterial pressure <65 mmHg  Source is unknown  Sepsis protocol initiated  Crystalloid Fluid Administration: Fluid resuscitation ordered per standard protocol - 30 mL/kg per current or ideal body weight  IV antibiotics as appropriate for source of sepsis  Reassessment: I have reassessed the patient's hemodynamic status  -Follow-up blood cultures, urine cultures  -Follow-up MRSA nares  -CT head, maxillofacial, abdomen/pelvis without any source of infection  -Urinalysis also does not appear infected  -Empiric coverage with cefepime and vancomycin  -Stress dose steroids also initiated    Hyperglycemia  Assessment & Plan  - Recent steroids for the last 2 weeks  -LDSSI plus hypoglycemia protocol  -Check A1c    Lactic acid acidosis  Assessment & Plan  - High-dose IV thiamine  -Trend    High anion gap metabolic acidosis  Assessment & Plan  - In the setting of severe hypovolemia in the setting of hemorrhagic shock and acute kidney injury  -Very significant lactic acidosis also contributing to high anion gap  -VBG with decently compensated metabolic acidosis  -No need for  "bicarb drip at this time, but low threshold to initiate if evidence of worsening acidosis  -Trend lactic acid    Acute kidney injury (HCC)  Assessment & Plan  - Urinalysis bland without proteinuria or hematuria  -Most likely prerenal  -Fluid resuscitation with blood products as above  -Strict I's/O  -Avoid contrast where possible  -Avoid nephrotoxins    At high risk for venous thromboembolism (VTE)  Assessment & Plan  - Hold home apixaban    DIC (disseminated intravascular coagulation) ([facility])  Assessment & Plan  - Fibrinogen less than 60, no evidence of hemolysis on labs  -Hemolysis labs have been ordered and pending, but are expected to be negative given that total bilirubin is normal  -Pancytopenia in all cell lines and PTT/INR/PT all elevated also consistent with DIC  -Consider obtaining CT PE, although patient does have an JACOBY  -Obtain bilateral lower extremity DVT ultrasounds  -Stat echocardiogram to assess cardiac function, formal read pending, but at visualization at bedside, LV appeared hyperdynamic and there was not evidence of any RV strain to suggest a massive PE  -Supportive care with transfusions of PRBC/platelets/FFP as detailed in \"[facility]\"  -Heme/onc consulted  -Every 8 hours fibrinogen, every 4 hours coags and CBC  -Hold home Eliquis  -Pain control with IV narcotics prn ordered    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            "

## 2022-12-28 NOTE — CONSULTS
Pediatric Hematology/Oncology   New Patient Consultation      Patient Name:  Tomas Jean-Baptiste  : 2000   MRN: [id number]    Location of Service: [facility] [place] Bed 627  Date of Service: 2022  Time: 3:37 PM    Primary Care Physician: Margarito Arvizu M.D.  Referring Physician: Margarito Arvizu M.D.    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 male with [place] Chromosome Precursor B-Cell Acute Lymphoblastic Leukemia who is receiving therapy per protocol [id number], on study presents to the [facility] Emergency Department with gram negative septic shock and DIC. He is currently Day 22 of delayed intensification phase of therapy.    Patient was last seen in clinic on 2022 for Day 15 delayed intensification phase of therapy with Vincristine, Doxorubicin (Zinecard for cardioprotection). He was also started on PO dexamethasone pulse for 7 days which he finished yesterday on 2022. He was taking famotidine for GI prophylaxis while taking dexamethasone. He also continued taking his imatinib. He received pegaspargase on Day 4 and given risk for DVT with pegaspargase, he was started on apixaban 5 mg p.o. twice daily 2 weeks before admission which he was taking at home up until presentation.    Since starting dexamethasone, patient has had intermittent nausea and vomiting. 2 days before his presentation to ER, patient had coffee ground emesis. He also complained of ongoing weakness and abdominal pain . JULY attributed all these symptoms to the side effects of dexamethasone. On the day of presentation to ER, patient was having palpitations, felt very light headed and dizzy. He had a syncopal episode  and ended up hitting his face/mouth with some bleeding noted in his mouth/teeth. He felt very very sick and ended up calling Dr. Faye who then recommended he come to the [facility]. Patient then called EMS who then transported him to [facility] ER immediately. No reports of fever, runny nose or cough prior to the event. No reports of sick contacts. At the time of presentation to ER patient was complaining of calf pain and extreme weakness.     Per EMS the patient was hypotensive and tachycardic with a starting pressure of 54/31 mm Hg and heart rate of 170s-180s.     Per ER Note:    1:22 PM Patient seen and examined at bedside. Discussed plan of care, including labs and imaging. Patient agrees to the plan of care. The patient will be resuscitated with 1L NS IV for sepsis.     1:38 PM Patient was treated with adenosine. His heart rate from improved from  160-140 to 120-130 bpm.     1:43 PM Patients heart rate returned to 160 bpm. Antibiotics and levophed started at this time.     1:55 PM Accompanied patient to CT. [facility]     2:00 PM oncology at bedside. Recommended a stress dose of steroids.      2:07 PM Patients blood pressure is improved to 128/48 after levophed. He will be treated with fentanyl and zofran.     2:20 PM Obtaining bedside ISTAT to check patients electrolytes and blood glucose.     2:30 PM Spoke to Dr. Ybarra who will determine if they have a bed in the Peds ICU for the patient.     2:35 PM Patient has a low platelet count and will receive a blood transfusion.      BLOOD CONSENT  I have explained to the patient the risks and benefits of transfusion of blood products.  This includes, as appropriate, the risk of mild allergic reaction, hemolytic reaction, transfusion-associated lung injury, febrile reactions, circulatory or iron overload, and infection.     We discussed possible alternatives and their risks, including directed donation, autologous  transfusion, and no transfusion, including IV or oral iron supplementation, as appropriate.  I believe the patient understands the risks and benefits and was able to express understanding.     2:44 PM Patient was reevaluated at bedside. He remains tachycardic in the 170 range. Dr. Faye at bedside. Lactic acid returned at 18.1 and BiCarb of 8.     2:50 PM Repeat lactic acid will be drawn now after fluid resuscitation.     3:00 PM I found out that there are no pediatric ICU beds available at this time, patient will be admitted to the medical ICU     3:55 PM discussed case with intensivist Dr. Apodaca who will come down evaluate patient and hospitalize for ongoing management.  Patient's Levophed is now at 0.6 , discussed this with Dr. Apodaca we will also initiate vasopressin     4:26PM patient has not been able to urinate, discussed Baer catheterization with oncology, given his severe immunosuppression at this time would avoid Baer catheterization.  They are recommending straight cath if needed.  Patient is straight cathed and 400 cc of urine is obtained     4:42PM patient is still tachycardic, I feel this is contributing to his hypotension.  Discussed this with Dr. Apodaca and pharmacist.  We may consider initiating esmolol drip if blood products are not improving his rate.  He is currently receiving platelets and red blood cells Lovenox.  He is being transferred to the ICU in critical condition.     HYDRATION: Based on the patient's presentation of Sepsis the patient was given IV fluids. IV Hydration was used because oral hydration was not adequate alone. Upon recheck following hydration, the patient was improved.      DISCUSSION  Patient is a 22-year-old male who comes in for evaluation of nausea, vomiting abdominal pain.  Differential diagnosis includes sepsis, bacteremia, abdominal infection, pneumonia, urinary tract infection, close head injury, intracranial hemorrhage, facial fracture, viral syndrome.  On arrival  patient is hypotensive and tachycardic in the 170s.  Large-bore IV was established by EMS, his port is accessed immediately for further resuscitation.  He is started on IV fluids on pressure bags.  EKG done at bedside demonstrates sinus tachycardia.  This may be SVT and therefore I will attempt cardioversion.  Given patient's recent chemotherapy he is also high risk for infection and sepsis protocol was initiated as well.     Patient's blood pressure improved to the 110s systolic with fluid resuscitation.  I attempted to give patient adenosine and heart rate improved to 120s, repeat EKG demonstrated sinus tachycardia, blood pressure did not improve with improvement in rate.  After few minutes heart rate returned to the 170s.  At this time is this is sinus tachycardia I do not believe he would benefit from electrical cardioversion.  Given concern for sepsis patient is empirically started on cefepime and vancomycin.  Pharmacy team was at bedside and agrees with this plan of antibiotics.  Given patient's facial and abdominal pain I elected to take him to CT scanner for further evaluation with scans of his head and abdomen.  I accompanied patient to CT and we were able to get the scans however patient did become more hypotensive while in the CT scanner.  We went back to his room patient was started on Levophed through his port.  CT imaging demonstrated no acute intra-abdominal or intracranial processes.     Maps improved with Levophed, he did ultimately required titration of Levophed up to 0.7.  Pediatric oncologist Dr. Duenas was at bedside when patient returned from CT scan.  Labs are returning and were notable for anemia and thrombocytopenia and therefore she recommended irradiated platelets and blood.  Pediatric oncology did want patient in the pediatric ICU, I discussed the case with Dr. Ybarra however they do not have beds available at this time, therefore patient will be hospitalized in the medical ICU.  Therefore  ultimately discussed the case with Dr. Apodaca.  Labs returned and were also notable for elevated troponin likely secondary to rate dependent and acute illness, no evidence of acute ischemia on EKG.  Procalcitonin is elevated 2.33 consistent with likely bacterial infection, most likely bacteremia.     Initial metabolic panel notable for hyperglycemia, anion gap and low bicarb, likely secondary to his lactic acidosis of 18.  There is possible concern per pediatric oncology from steroid-induced DKA.  Repeat labs after fluid resuscitation demonstrate blood sugar 169, anion gap of 24 and bicarb of 7.  Discussed potential need for insulin infusion with intensivist Dr. Apodaca who would not recommend starting it at this time, recommends continued aggressive fluid resuscitation.  Gap acidosis likely secondary to the lactic acidosis from his sepsis.  Viral swabs are trended and are negative for infection.  Chest x-ray returns demonstrates no evidence of pneumonia.     Patient's labs are also notable for hypocalcemia, this was prior to blood products.  Given his tachycardia and systemic body aches he was treated with calcium chloride through his port.  During his emergency department course he remained persistently tachycardic.  Discussed with pharmacy team and Dr. Apodaca about possible rate control.  Dr. Apodaca will consider rate control if he does not respond to blood product administration.  He is started on platelets and transferred to the ICU in critical condition.    Inpatient course: Once patient arrived to CICU, underwent very aggressive resuscitation. He received NS bolus, 4 units of pRBCs, 2 units of platelets, 3 units of FFP and 2 units of cryoprecipitate. Continued on stress dose hydrocortisone. Vasopressors titrated (norepinephrine and vasopressin). Continued on empiric broad spectrum antibiotics- cefepime and vancomycin. Had one large bloody emesis- started on protonix drip. NG tube placed.     Review of Systems:      Constitutional: Afebrile. Critically ill, rigors  HENT: Negative for auditory changes and sore throat.  No mouth sores. Positive for blood in mouth  Eyes: Negative for visual changes.  Respiratory: Negative for  shortness of breath and stridor.   Cardiovascular: Positive for palpitations, syncope  Gastrointestinal: Positive for bloody emesis  Genitourinary: Negative for dysuria and flank pain.    Musculoskeletal: Positive for back pain and leg pain  Skin: Pale, cold/clammy skin  Neurological: Positive for weakness  Endo/Heme/Allergies: Bleeds easily   Psychiatric/Behavioral: [place]    PAST MEDICAL HISTORY:     Oncologic History:  2-3 week history of worsening fatigue, right lower extremity pain  Presentation to OS and diagnosed with right LE superficial thrombus, subsegmental PE and hyperleukocytosis, anemia and thrombocytopenia  Transferred to [facility] for definitive care  Presenting (local) WBC > 440K, Hgb 10.0, platelets 53, (automated differential ANC 3190, ALC 75,310, absolute monocyte count 40695, absolute blast count 340,560)  Uric Acid 15.6, phosphorus 5.6, LDH 1114  Rasburicase x 1 dose given   Peripheral Blood flow cytometry demonstrating CD10 pos, CD19 pos, CD20 neg, CD22 dim (60%) 5/28/2022  Peripheral blood FISH for BCR-ABL1 positive in 98% of analyzed cells     Age at Diagnosis: 21 years  White Blood Cell Count at Presentation: > 440 k/uL  Testicular Disease Status: Negative (see procedure note 5/30/2022)  CNS Status: 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, diagnostic LP with WBC 1, RBC 3 and no evidence of leukemic blasts 5/30/2022  Steroid Pre-treatment: None  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     All inclusion/exclusion criteria for [id number] met and consent signed - enrolled 5/29/2022   All inclusion/exclusion criteria for [id number] met and consent signed - enrolled 5/30/2022  Confirmatory bone marrow  aspirate and biopsy and diagnostic LP + cytarabine 70 mg IT 5/30/2022  Induction therapy (ON STUDY [id number]) started 5/30/2022  Bone marrow immunohistochemistry consistent with diagnosis of B-ALL comprising 90% of marrow cellularity  Bone marrow sample sent to [facility] [place] purposes:  Flow cytom  Of the blood pressure little high that is a problem is a cultural problem is well and cultural genetic and everything else like that unfortunately breathalyzers such bad heart disease diabetes things like that  populations etry consistent with peripheral blood, cytogenetics remarkable for known t(9;22)  CSF with WBC 1, RBC 3, no blasts identified on cytospin  FISH results available 5/31/2022 making patient eligible for transfer from [place] [address] to [place] [address] as eligibility requirements were met for [place] [address]  Patient unenrolled from [place] [address] (BCR-ABL1 fusion positive) 6/1/2022  Consent signed for [place] [address] and patient enrolled 6/1/2022 (see eligibility checklist from 5/31/2022 and consent note from 6/1/2022)  Imatinib 400 mg PO QAM / 200 mg PO QPM started 6/3/2022 (allowed per [place] [address])  Patient completed the first 15 days of a Standard 4-drug Induction on 6/13/2022  Start of [facility](OS), Induction IA Part 2, Day 15 6/13/2022  End of Induction 1A Part 2 - MRD negative  Start of [place] [address](OS), Induction IA Part 2, Day 15 7/5/2022  Induction IB DELAYED 2 weeks 14 days from 7/26/2022-8/9/2022) for myelosuppression - Start of Day 22 cytarabine block 8/9/2022  Induction IB Day 42 delayed 9 days for myelosuppression - Day 42 evaluations 9/7/2022  End of Induction IB - Flow cytometric MRD negative, MRD by IgH-TCR PCR [id number]%  Randomization to AR-Experimental Arm B of [place] [address]  Start of AR-Experimental Arm B, Interim Maintenance 9/29/2022  Thrombocytopenia not permissive of proceeding with Day 15 of Interim Maintenance  AR-Experimental Arm B, Interim Maintenance, Day 15 delayed 4 days, start  10/17/2022  AR-Experimental Arm B, Interim Maintenance, Day 29, start 11/1/2022  AR-Experimental Arm B, Interim Maintenance, Day 43, start 11/14/2022  Last does of 6-MP 11/27/2022  AR-Experimental Arm B, Delayed Intensification, Day 1, start 12/6/2022      Past Medical History:    1) Previously Healthy  2) 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   Two dogs.  Everyone is well in the house. Father not involved.    Family History: No significant family history of cancer.  Both maternal and paternal family history of diabetes.     Immunizations:  UTD     Medications:     Current Outpatient Medications on File Prior to Encounter   Medication Sig Dispense Refill    sulfamethoxazole-trimethoprim (BACTRIM) 400-80 MG Tab Take 2 Tablets by mouth 2 times a day. Only on Saturday & Sunday      apixaban (ELIQUIS) 5mg Tab Take 5 mg by mouth 2 times a day.      imatinib (GLEEVEC) 100 MG tablet Take 200 mg by mouth every day. 200 mg = 2 tablets      famotidine (PEPCID) 20 MG Tab Take 1 Tablet by mouth 2 times a day. 60 Tablet 0    dexamethasone (DECADRON) 6 MG Tab Take 1 Tablet by mouth every 12 hours. 28 Tablet 0    dexamethasone (DECADRON) 1.5 MG Tab Take 2 Tablets by mouth every 12 hours. 56 Tablet 0    imatinib (GLEEVEC) 400 MG tablet TAKE 1 TABLET BY MOUTH  DAILY WITH TWO 100MG (600MG TOTAL DOSE) (Patient taking differently: Take 400 mg by mouth every day.) 30 Tablet 3    chlorhexidine (PERIDEX) 0.12 %  "Solution Swish and spit 15 mL by mouth 2 times a day. 473 mL 2    vitamin D3 (CHOLECALCIFEROL) 1000 Unit (25 mcg) Tab Take 1 Tablet by mouth every day. (Patient not taking: Reported on 12/20/2022)      ondansetron (ZOFRAN ODT) 8 MG TABLET DISPERSIBLE Dissovle 1 Tablet by mouth every 8 hours as needed for Nausea. 20 Tablet 0    therapeutic multivitamin-minerals (THERAGRAN-M) Tab Take 1 Tablet by mouth every day. (Patient not taking: Reported on 12/20/2022)           OBJECTIVE:     Vitals:   BP 96/54   Pulse 83   Temp 37.7 °C (99.9 °F) (Temporal)   Resp 17   Ht 1.664 m (5' 5.5\")   Wt 72.1 kg (158 lb 15.2 oz)   SpO2 99%     Labs:    Blood Culture: 12/27/2022 Growing E.Coli and Klebsiella pneumoniae (positive about 7 hrs after collection of sample)     Latest Reference Range & Units 12/27/22 14:20 12/27/22 14:48   WBC 4.8 - 10.8 K/uL  0.2 (LL)   RBC 4.70 - 6.10 M/uL  1.26 (L)   Hemoglobin 14.0 - 18.0 g/dL  4.4 (LL)   Hematocrit 42.0 - 52.0 %  13.7 (LL)   MCV 81.4 - 97.8 fL  108.7 (H)   MCH 27.0 - 33.0 pg  34.9 (H)   MCHC 33.7 - 35.3 g/dL  32.1 (L)   RDW 35.9 - 50.0 fL  64.8 (H)   Platelet Count 164 - 446 K/uL  6 (LL)   Nucleated RBC /100 WBC  0.00   NRBC (Absolute) K/uL  0.00   Sodium 135 - 145 mmol/L  135   Potassium 3.6 - 5.5 mmol/L  5.6 (H)   Chloride 96 - 112 mmol/L  104   Co2 20 - 33 mmol/L  7 (LL)   Anion Gap 7.0 - 16.0   24.0 (H)   Glucose 65 - 99 mg/dL  169 (H)   Bun 8 - 22 mg/dL  39 (H)   Creatinine 0.50 - 1.40 mg/dL  1.24   GFR (CKD-EPI) >60 mL/min/1.73 m 2  85   Calcium 8.5 - 10.5 mg/dL  6.0 (LL)   Lactic Acid 0.5 - 2.0 mmol/L  17.9 (HH)   Istat Sodium 135 - 145 mmol/L 131 (L)    Istat Potassium 3.6 - 5.5 mmol/L 5.5    Istat Ionized Calcium 1.10 - 1.30 mmol/L 1.02 (L)      CT Head: Head CT without contrast within normal limits. No evidence of acute cerebral infarction, hemorrhage or mass lesion.    CT Maxillofacial: Essentially negative maxillofacial sinus CT. Small retention cyst in the alveolar recess " of the right maxillary sinus of no clinical significance.    CT abdomen/pelvis: 1.  Hepatic steatosis. 2.  No acute inflammatory abnormality within the abdomen or pelvis.     US doppler lower extremities: Bilateral lower extremities.  No deep venous thrombosis.    All veins demonstrate complete color filling and compressibility with normal venous flow dynamics including spontaneous flow and respiratory  phasicity.  Interstitial fluid consistent with edema is observed below the knee.      Physical Exam:    Constitutional: Critically ill with cold/clammy extremities, rigors, c/o leg pain  HENT: Normocephalic and atraumatic. No nasal congestion or rhinorrhea. Crusted blood inside the mouth  Eyes: Conjunctivae are normal. Pupils are equal, round, and reactive to light.    Neck: Normal range of motion of neck, no adenopathy.    Cardiovascular: Tachycardic, regular rhythm and normal heart sounds.  No murmur heard. Delayed cap refill >4 sec  Pulmonary/Chest: Effort increased. No respiratory distress. Symmetric expansion.  No crackles or wheezes.  Abdomen: Soft. Bowel sounds are normal. No distension and no mass. There is no hepatosplenomegaly.    Genitourinary:  Deferred  Musculoskeletal: c/o leg pain.   Neurological: Alert and oriented to person and place.   Skin: Cold, clammy extremities, pallor+  Psychiatric: Anxious, in pain    ASSESSMENT AND PLAN:     Tomas Jean-Baptiste is a 22 y.o. male with [place] Chromosome Precursor B-Cell Acute Lymphoblastic Leukemia who is receiving therapy per protocol [id number], on study presents to the [facility] Emergency Department with gram negative septic shock and DIC. Culture now growing (within 7 hrs of drawing blood culture) GNR. Remains on empiric antibiotics. Critically ill requiring intensive care.    Gram negative sepsis/septic shock in an immunocompromised host:  - Blood culture from 12/27/2022: GNR  - Awaiting speciation and sensitivity  - Continue  empiric Cefepime/vancomycin for now and discontinue vancomycin based on speciation  - Wean pressors per CICU  - Continue stress dose hydrocortisone   - ICU team to consult ID   - Lactic acidosis improving after aggressive resuscitation     Ph+ Precursor B-Cell Acute Lymphoblastic Leukemia, in MRD Remission: Receiving therapy   SHORTY SANCHEZ Arm B, Delayed Intensification, Day 22  - HOLD imatinib given patient critically ill  - Not due for any chemotherapy until Day 29 which will be delayed due to current tenuous clinical condition  - If no improvement in clinical status, will consider starting G-CSF vs granulocyte infusion  - HOLD apixaban that was started on Day 8 to prevent pegaspargase induced clots.  - HOLD Bactrim (for PJP prophylaxis) this weekend given patient severely myelosuppressed. Restart next weekend.    Therapy related pancytopenia:  - Transfuse to maintain hemoglobin >8 gm/dL or with symptoms/volume replacement for hypotension  - Transfuse to maintain platelets >30,000/microliters (given hematemesis/hematochezia) or with symptoms/volume replacement for hypotension  - Use irradiated/leukoreduced products  - CBC daily     DIC secondary to septic shock  - S/p FFP and cryoprecipitate   - Fibrinogen level WNL, PTT/PT still prolonged  - Continue to monitor DIC panel and replace products accordingly  - On IV protonix due to bloody emesis/stool    Rest of the management per ICU team. Discussed with ICU attending, patient's mother and Dr. Faye. Patient remains critically ill.       Alyce Duenas MD  Pediatric Hematology / Oncology  [facility] [place]  Cell.  [phone]  [facility] [phone]

## 2022-12-28 NOTE — PROGRESS NOTES
4 Eyes Skin Assessment Completed by Eric KAT RN and Tatiana GARCIA RN.    Head WDL  Ears WDL  Nose WDL  Mouth WDL  Neck WDL  Breast/Chest right upper chest port  Shoulder Blades WDL  Spine WDL  (R) Arm/Elbow/Hand Bruising  (L) Arm/Elbow/Hand Bruising  Abdomen WDL  Groin WDL  Scrotum/Coccyx/Buttocks WDL  (R) Leg Bruising  (L) Leg Bruising  (R) Heel/Foot/Toe WDL  (L) Heel/Foot/Toe WDL          Devices In Places Tele Box, Blood Pressure Cuff, Pulse Ox, SCD's, OG/NG, Central Line, and Condom Cath      Interventions In Place Gray Ear Foams, Pillows, Q2 Turns, Low Air Loss Mattress, and Heels Loaded W/Pillows    Possible Skin Injury No    Pictures Uploaded Into Epic N/A  Wound Consult Placed N/A  RN Wound Prevention Protocol Ordered No

## 2022-12-28 NOTE — PROGRESS NOTES
Dr. Araujo notified of hypoglycemic events x2 overnight requiring D10% boluses with each event. Orders received for Q2 BS checks along with continuous D10% at low rate. Will continue to monitor closely.

## 2022-12-28 NOTE — PROGRESS NOTES
Pharmacy Vancomycin Kinetics Note for 12/27/2022     22 y.o. male on Vancomycin day # 1     Vancomycin Indication (Two level/Trough based Dosing): Sepsis (goal trough 15-20)    Provider specified end date: 01/01/23    Active Antibiotics (From admission, onward)      Ordered     Ordering Provider       Tue Dec 27, 2022  4:11 PM    12/27/22 1611  cefepime (Maxipime) 2 g in  mL IVPB  EVERY 8 HOURS         Caren Apodaca M.D.       Tue Dec 27, 2022  2:12 PM    12/27/22 1412  vancomycin (VANCOCIN) 2,000 mg in  mL IVPB  (vancomycin (VANCOCIN) IV (LD + Maintenance))  ONCE         Mely Hammond M.D.       Tue Dec 27, 2022  1:43 PM    12/27/22 1343  MD Alert...Vancomycin per Pharmacy  PHARMACY TO DOSE        Question:  Indication(s) for vancomycin?  Answer:  Other (comments)    Caren Apodaca M.D.            Dosing Weight: 75 kg (165 lb 5.5 oz)      Admission History: Admitted on 12/27/2022 for Shock (HCC) [R57.9]  Pertinent history: 22 year old male started on vancomycin and cefepime    Allergies:     Amoxicillin     Pertinent cultures to date:     Results       Procedure Component Value Units Date/Time    CoV-2, FLU A/B, and RSV by PCR (2-4 Hours CEPHEID) : Collect NP swab in VTM [[id number]] Collected: 12/27/22 1500    Order Status: Completed Specimen: Respirate Updated: 12/27/22 1639     Influenza virus A RNA Negative     Influenza virus B, PCR Negative     RSV, PCR Negative     SARS-CoV-2 by PCR NotDetected     Comment: [facility] providers: PLEASE REFER TO DE-ESCALATION AND RETESTING PROTOCOL  on [email or web address]    **The [employer] GeneXpert Xpress SARS-CoV-2 RT-PCR Test has been made  available for use under the Emergency Use Authorization (EUA) only.          SARS-CoV-2 Source NP Swab    URINALYSIS [[id number]]  (Abnormal) Collected: 12/27/22 1512    Order Status: Completed Specimen: Urine Updated: 12/27/22 1544     Color Yellow     Character Clear     Specific Gravity >=1.045     Ph 5.0     Glucose  "Negative mg/dL      Ketones Negative mg/dL      Protein Negative mg/dL      Bilirubin Negative     Urobilinogen, Urine 0.2     Nitrite Negative     Leukocyte Esterase Negative     Occult Blood Trace     Micro Urine Req Microscopic    Narrative:      Indication for culture:->Emergency Room Patient    URINE CULTURE(NEW) [[id number]] Collected: 22    Order Status: Sent Specimen: Urine Updated: 22 151    Narrative:      Indication for culture:->Emergency Room Patient    BLOOD CULTURE [[id number]] Collected: 22 1410    Order Status: Sent Specimen: Blood from Peripheral Updated: 22 1500    Narrative:      Per Hospital Policy: Only change Specimen Src: to \"Line\" if  specified by physician order.    BLOOD CULTURE [[id number]] Collected: 22 1345    Order Status: Sent Specimen: Blood from Peripheral Updated: 22 1355    Narrative:      Per Hospital Policy: Only change Specimen Src: to \"Line\" if  specified by physician order.            Labs:     Estimated Creatinine Clearance: 83.6 mL/min (by C-G formula based on SCr of 1.24 mg/dL).  Recent Labs     22  1346 22  1448   WBC 0.3* 0.2*   NEUTSPOLYS 0.00*  --      Recent Labs     22  1346 22  1448   BUN 44* 39*   CREATININE 1.29 1.24   ALBUMIN 1.9*  --        Intake/Output Summary (Last 24 hours) at 2022 1732  Last data filed at 2022 1724  Gross per 24 hour   Intake 307.24 ml   Output --   Net 307.24 ml      BP (!) 159/87   Pulse (!) 172   Temp 36.6 °C (97.9 °F) (Temporal)   Resp (!) 29   Ht 1.664 m (5' 5.5\")   Wt 75 kg (165 lb 5.5 oz)   SpO2 97%  Temp (24hrs), Av.1 °C (98.8 °F), Min:36.6 °C (97.9 °F), Max:37.6 °C (99.7 °F)      List concerns for Vancomycin clearance:     Age;BUN/Scr ratio greater than 20:1;Pressors/Hypotension    Pharmacokinetics:       A/P:     -  Vancomycin dose: vancomycin 2000 mg IV loading dose followed by dose by levels in setting of renal dysfunction and shock     -  Next " vancomycin level(s): ~ 12 hr random level on 12/28 at 0600    -  Comments:     Nara Lemus, XochiltD

## 2022-12-28 NOTE — ASSESSMENT & PLAN NOTE
Resolved  -Monitor carefully, as patient is getting a lot of blood products which chelate and worsen hypocalcemia

## 2022-12-28 NOTE — ASSESSMENT & PLAN NOTE
This is Severe Sepsis Present on admission  SIRS criteria identified on my evaluation include: Tachycardia, with heart rate greater than 90 BPM, Tachypnea, with respirations greater than 20 per minute and Leukopenia, with WBC less than 4,000  Clinical indicators of end organ dysfunction include Systolic blood pressure (SBP) <90 mmHg or mean arterial pressure <65 mmHg  Source is unknown  Sepsis protocol initiated  Crystalloid Fluid Administration: Fluid resuscitation ordered per standard protocol - 30 mL/kg per current or ideal body weight  IV antibiotics as appropriate for source of sepsis  Reassessment: I have reassessed the patient's hemodynamic status  Blood cultures with E coli and Klebsiella pneumoniae  MRSA nares negative  CT head, maxillofacial, abdomen/pelvis without any source of infection  Urinalysis also does not appear infected  -ID following, continue Cefepime  -Repeat blood cultures on 12/30 - NGTD  -Resume pneumocystis ppx Monday JAN 02 per ID recs  -Continue stress dose steroids  -Weaned off of vasopressor support

## 2022-12-28 NOTE — PROGRESS NOTES
1721: 2 units PRBC infusing via sara rapid infuser.      1725:  Patient c/o nausea, vomited 200cc blood.  Notified Dr. Apodaca.  3 FFP and 2 cryo ordered.

## 2022-12-28 NOTE — ASSESSMENT & PLAN NOTE
Last chemotherapy treatment on 12/20/2022, where patient received vincristine, doxorubicin, Dexrazoxane, and PEG-aspargase  He has been on apixaban for the last 2 weeks due to risk for PEG-aspargase related thrombotic events  -Hold apixaban in setting of hemorrhagic shock from DIC  -He was also on steroids which could have exacerbated his hemorrhagic gastritis  -IV Protonix BID  -Heme/Onc following  -Generalized weakness improving with more aggressive mobilization, likely hospital de-conditioning, appreciate PT/OT recs

## 2022-12-28 NOTE — PROGRESS NOTES
Patient's SPO2 continuing to drop below 90 to 87-88%. Increased O2 to 6L nasal cannula. Patient encouraged to take deep breaths. Notified Dr. Araujo of increasing oxygen demand since start of shift.

## 2022-12-28 NOTE — PROGRESS NOTES
Tech from Lab called with critical result of positive blood cultures, per tech 3/4 of the vials resulted with gram negative rods. Critical lab result read back to tech.   Dr. Araujo notified of critical lab result,Critical lab result read back by Dr. Araujo.

## 2022-12-28 NOTE — PROGRESS NOTES
[facility] ICU Progress Note      Admit Date: 12/27/2022    Resident(s): Milo Garcia M.D.   Attending:  EDMUND BONILLA/ Dr. Bernal    Patient ID:    Name:  Tomas Jean-Baptiste   YOB: 2000  Age:  22 y.o.  male   MRN:  [id number]    Hospital Course (carried forward and updated):  Tomas Jean-Baptiste is a 22 y.o. male with has a history of B cell acute lymphoblastic leukemia.  He has been on imatinib therapy since June 2022, and last received chemotherapy on 12/20/2022, which included vincristine, doxorubicin, Dexrazoxane, and PEG-aspargase.  He was then placed on a 7-day course of dexamethasone 9 mg oral twice daily, and did take famotidine for GI prophylaxis with this.  He also continued taking his imatinib.  And due to being at risk for thrombotic events from the PEG-aspargase, he was restarted 2 weeks before admission on apixaban 5 mg p.o. twice daily which she was taking at home up until presentation.        Presentation is most consistent with DIC. Hematology/oncology consulted. Patient did vomit a liter of blood from his stomach. Gastroenterology was notified, and opinion is that this is likely hemorrhagic gastritis from DIC and that there is no benefit in endoscopic intervention. Patient received 4 unit prbc, 2 unit platelet 2 unit cryo, his laboratories considerably improved after resus. Patient admitted to ICU for further management.    Patient cultures growing gram negative rods.    Consultants:  Critical Care  Oncology  Infectious disease     Interval Events:    Patient NG tube removed, tolerating diet so far, advancing slowly. Laboratories considerably improving after overnight colloid resus. Clinically appears improving as well. Vancomycin discontinued, ID consulted for gram negative bacteremia. PPI drip changed to BID IV.      Vitals Range last 24h:  Temp:  [35.8 °C (96.5 °F)-37.6 °C (99.7 °F)] 35.8 °C (96.5 °F)  Pulse:  [] 91  Resp:  [6-50] 14  BP: ()/(35-90)  99/55  SpO2:  [84 %-100 %] 97 %      Intake/Output Summary (Last 24 hours) at 12/28/2022 1323  Last data filed at 12/28/2022 1200  Gross per 24 hour   Intake 4908.9 ml   Output 7850 ml   Net -2941.1 ml        Review of Systems   Constitutional:  Positive for malaise/fatigue. Negative for chills.   HENT:  Negative for hearing loss.    Eyes:  Negative for blurred vision and double vision.   Respiratory:  Negative for cough and shortness of breath.    Cardiovascular:  Negative for chest pain and palpitations.   Gastrointestinal:  Positive for melena, nausea and vomiting.   Genitourinary:  Negative for flank pain and frequency.   Musculoskeletal:  Positive for back pain.   Skin:  Negative for rash.   Neurological:  Negative for dizziness, sensory change, speech change and headaches.   Psychiatric/Behavioral:  Negative for depression and substance abuse.      PHYSICAL EXAM:  Vitals:    12/28/22 1215 12/28/22 1230 12/28/22 1245 12/28/22 1300   BP: 99/55 94/51 95/54 99/55   Pulse: 82 84 89 91   Resp: 16 (!) 21 (!) 25 14   Temp:   35.8 °C (96.5 °F)    TempSrc:   Temporal    SpO2: 98% 97% 97% 97%   Weight:       Height:        Body mass index is 26.05 kg/m².    O2 therapy: Pulse Oximetry: 97 %, O2 (LPM): 2, O2 Delivery Device: Silicone Nasal Cannula    Date 12/28/22 0700 - 12/29/22 0659   Shift 1332-7047 6361-4142 2101-5432 24 Hour Total   INTAKE   Shift Total       OUTPUT   Urine 425   425     Output (mL) (External Urinary Catheter (Condom)) 425   425   Shift Total 425   425   NET -425   -425        Physical Exam  Constitutional:       Comments: Appears tired and uncomfortable laying on the bed, keeps requesting for NG tube to be removed. Somewhat irritable but redirectable and judgment wnl.   HENT:      Right Ear: There is no impacted cerumen.      Nose: No congestion.      Mouth/Throat:      Mouth: Mucous membranes are moist.      Pharynx: Oropharynx is clear. No oropharyngeal exudate.   Eyes:      General: No scleral  icterus.     Pupils: Pupils are equal, round, and reactive to light.   Cardiovascular:      Rate and Rhythm: Normal rate and regular rhythm.   Pulmonary:      Effort: No respiratory distress.      Breath sounds: No wheezing.   Abdominal:      General: There is no distension.      Tenderness: There is no abdominal tenderness. There is no guarding.   Skin:     General: Skin is warm.      Coloration: Skin is pale. Skin is not jaundiced.      Findings: No bruising or erythema.   Neurological:      Mental Status: He is alert.       Recent Labs     12/27/22  1420   ISTATAPH 7.242*   ISTATAPCO2 19.0*   ISTATAPO2 122*   ISTATATCO2 <10*   CXAIAET2WES 98   ISTATARTHCO3 8.2*   ISTATARTBE -18*   ISTATTEMP 36.8 C   ISTATFIO2 100   ISTATSPEC Arterial   ISTATAPHTC 7.244*   [id number] 121*     Recent Labs     12/27/22  1620 12/27/22 2042 12/28/22  0240 12/28/22  0900   SODIUM  --  143 144 142   POTASSIUM  --  4.7 3.9 3.8   CHLORIDE  --  114* 112 110   CO2  --  14* 18* 23   BUN  --  38* 40* 37*   CREATININE  --  1.07 0.99 0.86   MAGNESIUM 2.1  --   --   --    PHOSPHORUS 3.6  --   --   --    CALCIUM  --  7.0* 7.3* 7.0*     Recent Labs     12/27/22  1346 12/27/22  1448 12/27/22 2042 12/28/22  0240 12/28/22  0900   ALTSGPT 163*  --   --  183*  --    ASTSGOT 46*  --   --  209*  --    ALKPHOSPHAT 44  --   --  44  --    TBILIRUBIN 0.6  --   --  2.0*  --    GLUCOSE 311*   < > 51* 71 139*    < > = values in this interval not displayed.     Recent Labs     12/27/22  2042 12/28/22  0240 12/28/22  0614 12/28/22  0900   RBC 2.78* 3.15*  --  2.81*   HEMOGLOBIN 8.6* 9.8*  --  8.6*   HEMATOCRIT 25.5* 27.8*  --  24.5*   PLATELETCT 69* 72*  --  37*   PROTHROMBTM 20.5* 18.4* 19.4* 19.7*   APTT 33.9 33.1  --  36.7*   INR 1.80* 1.56* 1.68* 1.72*     Recent Labs     12/27/22  1346 12/27/22  1448 12/27/22  2042 12/28/22  0240 12/28/22  0900   WBC 0.3*   < > 0.2* 0.3* 0.2*   NEUTSPOLYS 0.00*  --  0.00*  --  2.60*   LYMPHOCYTES 96.10*  --  90.90*  --   97.40*   MONOCYTES 3.90  --  0.00  --  0.00   EOSINOPHILS 0.00  --  9.10*  --  0.00   BASOPHILS 0.00  --  0.00  --  0.00   ASTSGOT 46*  --   --  209*  --    ALTSGPT 163*  --   --  183*  --    ALKPHOSPHAT 44  --   --  44  --    TBILIRUBIN 0.6  --   --  2.0*  --     < > = values in this interval not displayed.       Meds:   dextrose 10%   30 mL/hr at 12/28/22 0336    hydrocortisone sodium succinate PF  50 mg      pantoprazole  40 mg      NORepinephrine  0-1 mcg/kg/min (Ideal) 0.35 mcg/kg/min (12/28/22 0656)    vasopressin (PITRESSIN) infusion  0.03 Units/min 0.03 Units/min (12/28/22 0439)    cefepime  2 g Stopped (12/28/22 0637)    thiamine  500 mg 500 mg (12/27/22 2102)    ondansetron  4 mg      HYDROmorphone  0.4 mg      Or    HYDROmorphone  0.6 mg      dexmedetomidine (Precedex) infusion  0.1-1.5 mcg/kg/hr (Ideal) 0.6 mcg/kg/hr (12/28/22 0803)    insulin regular  1-6 Units      And    dextrose bolus  25 g 25 g (12/28/22 0316)        Procedures:      Imaging:  DX-CHEST-PORTABLE (1 VIEW)   Final Result      No significant change from prior exam.      DX-CHEST-PORTABLE (1 VIEW)   Final Result      Worsening hypoinflation and bilateral perihilar infiltrates as well as prominence of the pulmonary vasculature suggesting pulmonary edema.  Superimposed pneumonia is not excluded.      EC-ECHOCARDIOGRAM LTD W/O CONT   Final Result      US-EXTREMITY VENOUS LOWER BILAT   Final Result      CT-ABDOMEN-PELVIS WITH   Final Result      1.  Hepatic steatosis.   2.  No acute inflammatory abnormality within the abdomen or pelvis.      CT-MAXILLOFACIAL W/O PLUS RECONS   Final Result      1.  Essentially negative maxillofacial sinus CT. Small retention cyst in the alveolar recess of the right maxillary sinus of no clinical significance.      CT-HEAD W/O   Final Result      Head CT without contrast within normal limits. No evidence of acute cerebral infarction, hemorrhage or mass lesion.         DX-CHEST-PORTABLE (1 VIEW)   Final Result       No evidence of acute cardiopulmonary process.          ASSESSEMENT and PLAN:    * Shock (HCC)- (present on admission)  Assessment & Plan  - Hemorrhagic shock from DIC  -Hemorrhagic gastritis evident  -NG tube dc'd  -Discussed platelet target with oncology, they deferred to ICU expertise, saying oftentimes quote transfuse below 10, but would have a goal of 30 in his case. Or evidence bleeding.  -Continue fibrinogen levels  -If evidence of developing volume overload, would begin albumin and IV Lasix   -GI has been consulted, there is no benefit from an endoscopic interventions and hemorrhagic gastritis from DIC  -IV Protonix drip changed to IV BID.  -Vasopressor support to target titrate to MAP greater than 65    Elevated troponin  Assessment & Plan  - Likely demand ischemia from severe shock  -No ischemic changes noted on EKG    Hypocalcemia  Assessment & Plan  -Replete calcium less than or equal to 1    Hyperkalemia  Assessment & Plan  - Noted in the setting of DIC  -Replete as necessary    Upper GI bleed  Assessment & Plan  - Upon admission patient noted to vomit almost a liter of blood  -Per GI opinion, no benefit in endoscopy  -Treat DIC and hemorrhagic shock with supportive care  -NG tube discontinued  -IV Protonix BID    Neutropenic sepsis (HCC)  Assessment & Plan  This is Severe Sepsis Present on admission  SIRS criteria identified on my evaluation include: Tachycardia, with heart rate greater than 90 BPM, Tachypnea, with respirations greater than 20 per minute and Leukopenia, with WBC less than 4,000  Clinical indicators of end organ dysfunction include Systolic blood pressure (SBP) <90 mmHg or mean arterial pressure <65 mmHg  Source is unknown  Sepsis protocol initiated  Crystalloid Fluid Administration: Fluid resuscitation ordered per standard protocol - 30 mL/kg per current or ideal body weight  IV antibiotics as appropriate for source of sepsis  Reassessment: I have reassessed the patient's  "hemodynamic status  -Follow-up blood cultures, urine cultures  -CT head, maxillofacial, abdomen/pelvis without any clear source of infection. Possible gastric or esophageal translocation, other sources possible but not revealing themselves.  -Urinalysis also does not appear infected  -Empiric coverage with cefepime.  -Discontinued vancomycin - cultures growing gram negative rods.  -Stress dose steroids initiated - continue    Hyperglycemia  Assessment & Plan  - Recent steroids for the last 2 weeks  -LDSSI plus hypoglycemia protocol    Lactic acid acidosis  Assessment & Plan  - High-dose IV thiamine  -Trend    High anion gap metabolic acidosis  Assessment & Plan  - In the setting of severe hypovolemia in the setting of hemorrhagic shock and acute kidney injury  -Very significant lactic acidosis also contributing to high anion gap  -VBG with decently compensated metabolic acidosis  -No need for bicarb drip at this time, but low threshold to initiate if evidence of worsening acidosis  -Trend lactic acid    Acute kidney injury (HCC)  Assessment & Plan  - Urinalysis bland without proteinuria or hematuria  -Most likely prerenal  -Fluid resuscitation with blood products as above  -Strict I's/O  -Avoid contrast where possible  -Avoid nephrotoxins    At high risk for venous thromboembolism (VTE)  Assessment & Plan  - Hold home apixaban    DIC (disseminated intravascular coagulation) ([facility])  Assessment & Plan  - Fibrinogen less than 60, no evidence of hemolysis on labs  -Hemolysis labs have been ordered and pending, but are expected to be negative given that total bilirubin is normal  -Pancytopenia in all cell lines and PTT/INR/PT all elevated also consistent with DIC  -Supportive care with transfusions of PRBC/platelets/FFP as detailed in \"Shock\"  -Heme/onc consulted  -Continue to trend fibrinogen  -Hold home Eliquis  -Pain control with IV narcotics prn ordered    Acute lymphoblastic leukemia (ALL) ([facility])  Assessment & Plan  - " Last chemotherapy treatment on 12/20/2022, where patient received vincristine, doxorubicin, Dexrazoxane, and PEG-aspargase  -He has been on apixaban for the last 2 weeks due to risk for PEG-aspargase related thrombotic events  -Hold apixaban in setting of hemorrhagic shock from DIC  -He was also on steroids which could have exacerbated his hemorrhagic gastritis  -IV Protonix drip  -heme/onc consulted        DISPO: ICU    CODE STATUS: FULL    Quality Measures:  Feeding: Advance to regular as tolerated  Analgesia: Prn dilaudid  Sedation: -  Thromboprophylaxis: -  Head of bed: >30 degrees  Ulcer prophylaxis: Pantoprazole  Glycemic control: Correctional: ISS / Basal: -  Bowel care: bowel regimen prn  Indwelling lines: PIV  Deescalation of antibiotics: Cefepime      Milo Garcia M.D.

## 2022-12-28 NOTE — ASSESSMENT & PLAN NOTE
In the setting of severe hypovolemia in the setting of hemorrhagic shock and acute kidney injury  Resolved

## 2022-12-28 NOTE — PROGRESS NOTES
2100: Patient's BG 44 from venous draw, recheck fingerstick 49. PRN D10 bolus administered.  2130: Recheck FSBG after D10 bolus 142.

## 2022-12-28 NOTE — CARE PLAN
"The patient is Watcher - Medium risk of patient condition declining or worsening    Shift Goals  Clinical Goals: hemodynamic stability, monitor bleeding/labs, comfort/pain control  Patient Goals: comfort, \"drink water\"  Family Goals: improvement of labs, hemodynamic stabilty    Progress made toward(s) clinical / shift goals:  Levophed and vasopressin to maintain MAP >65. Q6h lab monitoring. Provided heat pads and PRN pain medication as indicated for leg pain, patient reports improvement with interventions.     Patient is not progressing towards the following goals: Patient's O2 demand has been increasing since beginning of shift, currently on 15L nonrebreather, tolerating well. Unable to drink water due to NPO status, educated patient and mother/verbalize understanding.      Problem: Knowledge Deficit - Standard  Goal: Patient and family/care givers will demonstrate understanding of plan of care, disease process/condition, diagnostic tests and medications  Outcome: Progressing     Problem: Pain - Standard  Goal: Alleviation of pain or a reduction in pain to the patient’s comfort goal  Outcome: Progressing     Problem: Skin Integrity  Goal: Skin integrity is maintained or improved  Outcome: Progressing     Problem: Fall Risk  Goal: Patient will remain free from falls  Outcome: Progressing     Problem: Hemodynamics  Goal: Patient's hemodynamics, fluid balance and neurologic status will be stable or improve  Outcome: Progressing     Problem: Respiratory  Goal: Patient will achieve/maintain optimum respiratory ventilation and gas exchange  Outcome: Progressing     Problem: Infection - Standard  Goal: Patient will remain free from infection  Outcome: Progressing     "

## 2022-12-28 NOTE — ASSESSMENT & PLAN NOTE
Mostly likely secondary to hemorrhagic shock  Resolved with restoration of hemodynamics and ongoing blood product support  Strict I/O's  Resolved  Continue to monitor

## 2022-12-28 NOTE — ASSESSMENT & PLAN NOTE
"Fibrinogen <60 on admission trending up to 690s, no evidence of hemolysis on labs  Mild T bili elevation, LDH mild elevation to 290s, Haptoglobin normal  Pancytopenia in all cell lines and PTT/INR/PT all elevated also consistent with DIC  LE U/S negative for DVT  TTE EF 65% no wall motion abnormality  -Supportive care with transfusions of PRBC/platelets/FFP as detailed in \"Shock\"  -Heme/Onc followign  -BID labs  -Hold home Eliquis  -Pain control with IV narcotics prn ordered  "

## 2022-12-28 NOTE — CONSULTS
INFECTIOUS DISEASES INPATIENT CONSULT NOTE     Date of Service: 12/28/2022    Consult Requested By: Uriel Bernal M.D.    Reason for Consultation: septic shock neutropenic    History of Present Illness:   Tomas Jean-Baptiste is a 22 y.o. male on chemo for ALL admitted 12/27/2022 to the ICU due to hemorrhagic shock.  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  Due to h/o DVT and for thrombotic events from the PEG-aspargase, he is on anticoagulation with apixaban    About a week ago after his chemotherapy treatment he developed nausea and vomiting, initiially not bloody.  Associated with lightheadedness, palpitations and chest pain.  On the day of admission he had a syncopal episode and with trauma and bleeding.    Per EMS tachycardia to 180s, that reduced to have 140s with a dose of adenosine + anemic and thrombocytopenic.    In the ED severe hypotension 54/31 and tachycardic 170s to 180s.  He was started IVF, Levophed infusion   He denies antecedent resp or GI illnesses. No fever. +hematemesis of approx 1 liter of lbood  Marked lactic acidosis 18.1, WBC count of 0.3 with ANC 0, hgb 4.4-6.3, and platelets of 6-12.  Follow-up Troponin was 85.  Procalcitonin was 2.33.  Potassium was 6.4, and bicarb 8, with anion gap of 28, blood glucose of 311, and BUN/creatinine of 44/1.29. Pediatric Hematology/oncology consulted.  This presentation is most consistent with DIC. Gastroenterology consulted-no benefit in endoscopic intervention.   Flu/COVID/RSV negative  Blood cultures +GNR  CXR no infiltrates  CT head no hemorrhage   CT abdomen/pelvis neg  Currently on cefepime  Infectious Diseases consulted for antibiotic recommendation and management       Review Of Systems:  Hematemesis  No fever  All  other systems are reviewed and are negative     PMH:   Past Medical History:   Diagnosis Date    Cancer (HCC)     Leukoma          PSH:  Past Surgical History:   Procedure Laterality Date    CATH PLACEMENT Right 8/29/2022    Procedure: INSERTION, CATHETER;  Surgeon: Simona Moise M.D.;  Location: SURGERY [facility];  Service: Ent       FAMILY HX:  Family History   Problem Relation Age of Onset    No Known Problems Mother     Stroke Maternal Grandmother     Diabetes Paternal Grandfather     Hypertension Paternal Grandfather     Hyperlipidemia Paternal Grandfather     Cancer Neg Hx     Heart Disease Neg Hx        SOCIAL HX:  Social History     Socioeconomic History    Marital status: Single     Spouse name: Not on file    Number of children: Not on file    Years of education: Not on file    Highest education level: Not on file   Occupational History    Not on file   Tobacco Use    Smoking status: Never    Smokeless tobacco: Never   Vaping Use    Vaping Use: Never used   Substance and Sexual Activity    Alcohol use: Never    Drug use: Never    Sexual activity: Not Currently   Other Topics Concern    Behavioral problems Not Asked    Interpersonal relationships Not Asked    Sad or not enjoying activities Not Asked    Suicidal thoughts Not Asked    Poor school performance Not Asked    Reading difficulties Not Asked    Speech difficulties Not Asked    Writing difficulties Not Asked    Inadequate sleep Not Asked    Excessive TV viewing Not Asked    Excessive video game use Not Asked    Inadequate exercise Not Asked    Sports related Not Asked    Poor diet Not Asked    Family concerns for drug/alcohol abuse Not Asked    Poor oral hygiene Not Asked    Bike safety Not Asked    Family concerns vehicle safety Not Asked   Social History Narrative    Not on file     Social Determinants of Health     Financial Resource Strain: Not on file   Food Insecurity: Not on file   Transportation Needs: Not on file   Physical Activity: Not  on file   Stress: Not on file   Social Connections: Not on file   Intimate Partner Violence: Not on file   Housing Stability: Not on file     Social History     Tobacco Use   Smoking Status Never   Smokeless Tobacco Never     Social History     Substance and Sexual Activity   Alcohol Use Never       Allergies/Intolerances:  Allergies   Allergen Reactions    Amoxicillin Rash     Reacted as an infant. No swelling or airway problems.  Tolerated cefepime June 2022         Other Current Medications:    Current Facility-Administered Medications:     dextrose 10% infusion, , Intravenous, Continuous, Jovanny Araujo M.D., Last Rate: 30 mL/hr at 12/28/22 1325, Rate Verify at 12/28/22 1325    hydrocortisone sodium succinate PF (Solu-CORTEF) 100 MG injection 50 mg, 50 mg, Intravenous, Q6HRS, Uriel Bernal M.D.    pantoprazole (Protonix) injection 40 mg, 40 mg, Intravenous, BID, Uriel Bernal M.D., 40 mg at 12/28/22 1132    norepinephrine (Levophed) 8 mg in 250 mL NS infusion (premix), 0-1 mcg/kg/min (Ideal), Intravenous, Continuous, Mely Hammond M.D., Last Rate: 9.4 mL/hr at 12/28/22 1325, 0.08 mcg/kg/min at 12/28/22 1325    vasopressin (Vasostrict) 20 Units in  mL Infusion, 0.03 Units/min, Intravenous, Continuous, Mely Hammond M.D., Last Rate: 9 mL/hr at 12/28/22 1325, 0.03 Units/min at 12/28/22 1325    cefepime (Maxipime) 2 g in  mL IVPB, 2 g, Intravenous, Q8HRS, Caren Apodaca M.D., Stopped at 12/28/22 0637    thiamine (B-1) 500 mg in dextrose 5% 100 mL IVPB, 500 mg, Intravenous, DAILY, Caren Apodaca M.D., Last Rate: 200 mL/hr at 12/27/22 2102, 500 mg at 12/27/22 2102    ondansetron (ZOFRAN) syringe/vial injection 4 mg, 4 mg, Intravenous, Q6HRS PRN, Caren Apodaca M.D., 4 mg at 12/27/22 1748    HYDROmorphone (Dilaudid) injection 0.4 mg, 0.4 mg, Intravenous, Q2HRS PRN, 0.4 mg at 12/28/22 0919 **OR** HYDROmorphone (Dilaudid) injection 0.6 mg, 0.6 mg, Intravenous, Q2HRS PRN, Caren Apodaca,  "M.D., 0.6 mg at 22    dexmedetomidine (PRECEDEX) 400 mcg/100mL NS premix infusion, 0.1-1.5 mcg/kg/hr (Ideal), Intravenous, Continuous, Caren Apodaca M.D., Stopped at 22 1254    insulin regular (HumuLIN R,NovoLIN R) injection, 1-6 Units, Subcutaneous, Q6HRS **AND** POC blood glucose manual result, , , Q6H **AND** NOTIFY MD and PharmD, , , Once **AND** Administer 20 grams of glucose (approximately 8 ounces of fruit juice) every 15 minutes PRN FSBG less than 70 mg/dL, , , PRN **AND** dextrose 10 % BOLUS 25 g, 25 g, Intravenous, Q15 MIN PRN, Erum Olivares M.D., Last Rate: 999 mL/hr at 22 0316, 25 g at 22 0316      Most Recent Vital Signs:  BP 99/55   Pulse 91   Temp 35.8 °C (96.5 °F) (Temporal)   Resp 14   Ht 1.664 m (5' 5.5\")   Wt 72.1 kg (158 lb 15.2 oz)   SpO2 97%   BMI 26.05 kg/m²   Temp  Av.6 °C (97.8 °F)  Min: 35.8 °C (96.5 °F)  Max: 37.6 °C (99.7 °F)    Physical Exam:  General: Ill-appearing, well nourished no acute distress  HEENT: NCAT, PERRLA, sclera anicteric, PERRL, EOMI,  no oral lesions Dentition good  Neck: supple, no lymphadenopathy  Chest: CTAB, unlabored.No wheeze or rhonchi  Cardiac: tachy RRR,  no m/r/g   Abdomen: + bowel sounds, soft, non-tender, non-distended  Extremities: No cyanosis, clubbing. + edema, 2+ pulses  Skin: no rashes   Neuro: Alert and oriented times 3, Speech fluent CN intact SUAREZ  Psych: Mood normal Affect flat    Pertinent Lab Results:  Recent Labs     22  0240 22  0900   WBC 0.2* 0.3* 0.2*      Recent Labs     22  1346 22  1448 22  2042 22  0240 22  0900   HEMOGLOBIN 6.3*   < > 8.6* 9.8* 8.6*   HEMATOCRIT 19.7*   < > 25.5* 27.8* 24.5*   .5*   < > 91.7 88.3 87.2   MCH 34.1*   < > 30.9 31.1 30.6   MACROCYTOSIS 1+  --  1+  --  1+   ANISOCYTOSIS 2+*  --  1+  --  1+   PLATELETCT 12*   < > 69* 72* 37*    < > = values in this interval not displayed.         Recent Labs     " "12/27/22  2042 12/28/22  0240 12/28/22  0900   SODIUM 143 144 142   POTASSIUM 4.7 3.9 3.8   CHLORIDE 114* 112 110   CO2 14* 18* 23   CREATININE 1.07 0.99 0.86        Recent Labs     12/27/22  1346 12/28/22  0240   ALBUMIN 1.9* 2.4*        Pertinent Micro:  Results       Procedure Component Value Units Date/Time    BLOOD CULTURE [[id number]]  (Abnormal) Collected: 12/27/22 1410    Order Status: Completed Specimen: Blood from Peripheral Updated: 12/28/22 1158     Significant Indicator POS     Source BLD     Site PERIPHERAL     Culture Result Growth detected by Bactec instrument. 12/27/2022  21:19      Escherichia coli      Klebsiella pneumoniae    Narrative:      CALL  Jenkins  161 tel. [phone],  CALLED  161 tel. [phone] 12/27/2022, 21:25, RB PERF. RESULTS CALLED TO: RN  [id number]  Per Hospital Policy: Only change Specimen Src: to \"Line\" if  specified by physician order.  Left AC    BLOOD CULTURE [[id number]]  (Abnormal) Collected: 12/27/22 1345    Order Status: Completed Specimen: Blood from Peripheral Updated: 12/28/22 1158     Significant Indicator POS     Source BLD     Site PERIPHERAL     Culture Result Growth detected by Bactec instrument. 12/27/2022  21:20      Escherichia coli      Klebsiella pneumoniae    Narrative:      CALL  Jenkins  161 tel. [phone],  CALLED  161 tel. [phone] 12/27/2022, 21:24, RB PERF. RESULTS CALLED TO: RN  [phone]  Per Hospital Policy: Only change Specimen Src: to \"Line\" if  specified by physician order.  No site indicated    S. Aureus By PCR, Nasal Complete [[id number]] Collected: 12/27/22 2105    Order Status: Completed Specimen: Respirate from Respiratory Updated: 12/28/22 0109     Staph aureus by PCR Negative     MRSA by PCR Negative    Narrative:      Collected By: [id number] JASON DELATORRE    CoV-2, FLU A/B, and RSV by PCR (2-4 Hours CEPHEID) : Collect NP swab in VTM [[id number]] Collected: 12/27/22 1500    Order Status: Completed Specimen: Respirate Updated: 12/27/22 1639     " Influenza virus A RNA Negative     Influenza virus B, PCR Negative     RSV, PCR Negative     SARS-CoV-2 by PCR NotDetected     Comment: [facility] providers: PLEASE REFER TO DE-ESCALATION AND RETESTING PROTOCOL  on [email or web address]    **The [employer] GeneXpert Xpress SARS-CoV-2 RT-PCR Test has been made  available for use under the Emergency Use Authorization (EUA) only.          SARS-CoV-2 Source NP Swab    URINALYSIS [[id number]]  (Abnormal) Collected: 12/27/22 1512    Order Status: Completed Specimen: Urine Updated: 12/27/22 1544     Color Yellow     Character Clear     Specific Gravity >=1.045     Ph 5.0     Glucose Negative mg/dL      Ketones Negative mg/dL      Protein Negative mg/dL      Bilirubin Negative     Urobilinogen, Urine 0.2     Nitrite Negative     Leukocyte Esterase Negative     Occult Blood Trace     Micro Urine Req Microscopic    Narrative:      Indication for culture:->Emergency Room Patient    URINE CULTURE(NEW) [[id number]] Collected: 12/27/22 1512    Order Status: Sent Specimen: Urine Updated: 12/27/22 1519    Narrative:      Indication for culture:->Emergency Room Patient          No results found for: BLOODCULTU, BLDCULT, BCHOLD     Studies:  EKG   IMPRESSION:   CT  1.  Hepatic steatosis.  2.  No acute inflammatory abnormality within the abdomen or pelvis  IMPRESSION:   Profound neutropenia  Hemorrhagic + septic shock  Leukemia  Pancytopenia  Profound neutropenia  Port in place      PLAN:   Continue cefepime  Transfusions as needed  Will need explantation port once stabilized as likely infected  GCSF per oncology  High risk for additional bleeding  Final antibiotic recommendations per culture results and clinical course      Plan of care discussed with IM Uriel Bernal M.D.. Will continue to follow    Nova Louis M.D.

## 2022-12-28 NOTE — ADDENDUM NOTE
Encounter addended by: Pepe Faye M.D. on: 12/27/2022 4:37 PM   Actions taken: Actions taken from a BestPractice Advisory, Clinical Note Signed

## 2022-12-28 NOTE — PROGRESS NOTES
Dr. Araujo notified of dark burgundy stool; 450mL out. Per Dr. Araujo, will not send occult stool at this time.

## 2022-12-28 NOTE — ASSESSMENT & PLAN NOTE
Likely demand ischemia from severe shock  No ischemic changes noted on EKG  Downtrended, no need to continue to monitor

## 2022-12-28 NOTE — PROGRESS NOTES
Notified by monitor tech that patient appears to have some ST elevation on telemetry monitoring. Patient denies chest pain at this time although says he did have chest pain several hours prior. Stat EKG ordered, <1 mm ST elevation noted. Dr. Araujo notified, received new orders for troponin.

## 2022-12-29 ENCOUNTER — HOSPITAL ENCOUNTER (INPATIENT)
Dept: RADIOLOGY | Facility: MEDICAL CENTER | Age: 22
DRG: 871 | End: 2022-12-29
Attending: INTERNAL MEDICINE
Payer: COMMERCIAL

## 2022-12-29 ENCOUNTER — APPOINTMENT (OUTPATIENT)
Dept: RADIOLOGY | Facility: MEDICAL CENTER | Age: 22
DRG: 871 | End: 2022-12-29
Attending: STUDENT IN AN ORGANIZED HEALTH CARE EDUCATION/TRAINING PROGRAM
Payer: COMMERCIAL

## 2022-12-29 PROBLEM — R78.81 GRAM-NEGATIVE BACTEREMIA: Status: ACTIVE | Noted: 2022-12-29

## 2022-12-29 LAB
ALBUMIN SERPL BCP-MCNC: 1.9 G/DL (ref 3.2–4.9)
ALBUMIN/GLOB SERPL: 1.1 G/DL
ALP SERPL-CCNC: 62 U/L (ref 30–99)
ALT SERPL-CCNC: 184 U/L (ref 2–50)
ANION GAP SERPL CALC-SCNC: 5 MMOL/L (ref 7–16)
ANION GAP SERPL CALC-SCNC: 5 MMOL/L (ref 7–16)
APTT PPP: 36.3 SEC (ref 24.7–36)
APTT PPP: 37.4 SEC (ref 24.7–36)
APTT PPP: 39.5 SEC (ref 24.7–36)
APTT PPP: 39.8 SEC (ref 24.7–36)
AST SERPL-CCNC: 144 U/L (ref 12–45)
BACTERIA BLD CULT: ABNORMAL
BACTERIA UR CULT: NORMAL
BARCODED ABORH UBTYP: 2800
BARCODED ABORH UBTYP: 5100
BARCODED ABORH UBTYP: 6200
BARCODED ABORH UBTYP: 7300
BARCODED PRD CODE UBPRD: NORMAL
BARCODED UNIT NUM UBUNT: NORMAL
BILIRUB SERPL-MCNC: 1.7 MG/DL (ref 0.1–1.5)
BUN SERPL-MCNC: 20 MG/DL (ref 8–22)
BUN SERPL-MCNC: 29 MG/DL (ref 8–22)
CA-I SERPL-SCNC: 1.1 MMOL/L (ref 1.1–1.3)
CALCIUM ALBUM COR SERPL-MCNC: 9.1 MG/DL (ref 8.5–10.5)
CALCIUM SERPL-MCNC: 7.4 MG/DL (ref 8.5–10.5)
CALCIUM SERPL-MCNC: 7.9 MG/DL (ref 8.5–10.5)
CFT BLD TEG: 8.6 MIN (ref 4.6–9.1)
CFT P HPASE BLD TEG: 10.1 MIN (ref 4.3–8.3)
CHLORIDE SERPL-SCNC: 106 MMOL/L (ref 96–112)
CHLORIDE SERPL-SCNC: 109 MMOL/L (ref 96–112)
CLOT ANGLE BLD TEG: 72.6 DEGREES (ref 63–78)
CLOT LYSIS 30M P MA LENFR BLD TEG: 0 % (ref 0–2.6)
CO2 SERPL-SCNC: 26 MMOL/L (ref 20–33)
CO2 SERPL-SCNC: 30 MMOL/L (ref 20–33)
COMPONENT P 8504P: NORMAL
CREAT SERPL-MCNC: 0.44 MG/DL (ref 0.5–1.4)
CREAT SERPL-MCNC: 0.47 MG/DL (ref 0.5–1.4)
CT.EXTRINSIC BLD ROTEM: 1.3 MIN (ref 0.8–2.1)
ERYTHROCYTE [DISTWIDTH] IN BLOOD BY AUTOMATED COUNT: 48.8 FL (ref 35.9–50)
ERYTHROCYTE [DISTWIDTH] IN BLOOD BY AUTOMATED COUNT: 48.8 FL (ref 35.9–50)
ERYTHROCYTE [DISTWIDTH] IN BLOOD BY AUTOMATED COUNT: 49.6 FL (ref 35.9–50)
ERYTHROCYTE [DISTWIDTH] IN BLOOD BY AUTOMATED COUNT: 49.8 FL (ref 35.9–50)
FIBRINOGEN PPP-MCNC: 398 MG/DL (ref 215–460)
FIBRINOGEN PPP-MCNC: 451 MG/DL (ref 215–460)
FIBRINOGEN PPP-MCNC: 491 MG/DL (ref 215–460)
FIBRINOGEN PPP-MCNC: 587 MG/DL (ref 215–460)
GFR SERPLBLD CREATININE-BSD FMLA CKD-EPI: 151 ML/MIN/1.73 M 2
GFR SERPLBLD CREATININE-BSD FMLA CKD-EPI: 154 ML/MIN/1.73 M 2
GLOBULIN SER CALC-MCNC: 1.8 G/DL (ref 1.9–3.5)
GLUCOSE BLD STRIP.AUTO-MCNC: 112 MG/DL (ref 65–99)
GLUCOSE BLD STRIP.AUTO-MCNC: 134 MG/DL (ref 65–99)
GLUCOSE BLD STRIP.AUTO-MCNC: 134 MG/DL (ref 65–99)
GLUCOSE BLD STRIP.AUTO-MCNC: 150 MG/DL (ref 65–99)
GLUCOSE BLD STRIP.AUTO-MCNC: 151 MG/DL (ref 65–99)
GLUCOSE SERPL-MCNC: 133 MG/DL (ref 65–99)
GLUCOSE SERPL-MCNC: 154 MG/DL (ref 65–99)
HAPTOGLOB SERPL-MCNC: 121 MG/DL (ref 30–200)
HCT VFR BLD AUTO: 22.3 % (ref 42–52)
HCT VFR BLD AUTO: 22.8 % (ref 42–52)
HCT VFR BLD AUTO: 23.6 % (ref 42–52)
HCT VFR BLD AUTO: 28.2 % (ref 42–52)
HGB BLD-MCNC: 7.8 G/DL (ref 14–18)
HGB BLD-MCNC: 8.1 G/DL (ref 14–18)
HGB BLD-MCNC: 8.4 G/DL (ref 14–18)
HGB BLD-MCNC: 9.7 G/DL (ref 14–18)
INR PPP: 1.39 (ref 0.87–1.13)
INR PPP: 1.55 (ref 0.87–1.13)
INR PPP: 1.56 (ref 0.87–1.13)
INR PPP: 1.6 (ref 0.87–1.13)
LACTATE SERPL-SCNC: 1.1 MMOL/L (ref 0.5–2)
MCF BLD TEG: 54.9 MM (ref 52–69)
MCF.PLATELET INHIB BLD ROTEM: 34.7 MM (ref 15–32)
MCH RBC QN AUTO: 28.6 PG (ref 27–33)
MCH RBC QN AUTO: 29.3 PG (ref 27–33)
MCH RBC QN AUTO: 29.8 PG (ref 27–33)
MCH RBC QN AUTO: 29.8 PG (ref 27–33)
MCHC RBC AUTO-ENTMCNC: 34.4 G/DL (ref 33.7–35.3)
MCHC RBC AUTO-ENTMCNC: 35 G/DL (ref 33.7–35.3)
MCHC RBC AUTO-ENTMCNC: 35.5 G/DL (ref 33.7–35.3)
MCHC RBC AUTO-ENTMCNC: 35.6 G/DL (ref 33.7–35.3)
MCV RBC AUTO: 83.2 FL (ref 81.4–97.8)
MCV RBC AUTO: 83.7 FL (ref 81.4–97.8)
MCV RBC AUTO: 83.8 FL (ref 81.4–97.8)
MCV RBC AUTO: 83.8 FL (ref 81.4–97.8)
NRBC # BLD AUTO: 0.05 K/UL
NRBC BLD-RTO: 50 /100 WBC
NRBC BLD-RTO: 62.5 /100 WBC
NRBC BLD-RTO: 62.5 /100 WBC
NRBC BLD-RTO: 71.4 /100 WBC
PA AA BLD-ACNC: 100 % (ref 0–11)
PA ADP BLD-ACNC: 44.9 % (ref 0–17)
PLATELET # BLD AUTO: 11 K/UL (ref 164–446)
PLATELET # BLD AUTO: 32 K/UL (ref 164–446)
PLATELET # BLD AUTO: 40 K/UL (ref 164–446)
PLATELET # BLD AUTO: 9 K/UL (ref 164–446)
PMV BLD AUTO: 10.1 FL (ref 9–12.9)
PMV BLD AUTO: 10.6 FL (ref 9–12.9)
PMV BLD AUTO: 9.3 FL (ref 9–12.9)
POTASSIUM SERPL-SCNC: 3.5 MMOL/L (ref 3.6–5.5)
POTASSIUM SERPL-SCNC: 3.9 MMOL/L (ref 3.6–5.5)
PRODUCT TYPE UPROD: NORMAL
PROT SERPL-MCNC: 3.7 G/DL (ref 6–8.2)
PROTHROMBIN TIME: 16.8 SEC (ref 12–14.6)
PROTHROMBIN TIME: 18.2 SEC (ref 12–14.6)
PROTHROMBIN TIME: 18.3 SEC (ref 12–14.6)
PROTHROMBIN TIME: 18.7 SEC (ref 12–14.6)
RBC # BLD AUTO: 2.66 M/UL (ref 4.7–6.1)
RBC # BLD AUTO: 2.72 M/UL (ref 4.7–6.1)
RBC # BLD AUTO: 2.82 M/UL (ref 4.7–6.1)
RBC # BLD AUTO: 3.39 M/UL (ref 4.7–6.1)
SIGNIFICANT IND 70042: ABNORMAL
SIGNIFICANT IND 70042: ABNORMAL
SIGNIFICANT IND 70042: NORMAL
SITE SITE: ABNORMAL
SITE SITE: ABNORMAL
SITE SITE: NORMAL
SODIUM SERPL-SCNC: 137 MMOL/L (ref 135–145)
SODIUM SERPL-SCNC: 144 MMOL/L (ref 135–145)
SOURCE SOURCE: ABNORMAL
SOURCE SOURCE: ABNORMAL
SOURCE SOURCE: NORMAL
TEG ALGORITHM TGALG: ABNORMAL
TROPONIN T SERPL-MCNC: 207 NG/L (ref 6–19)
TROPONIN T SERPL-MCNC: 226 NG/L (ref 6–19)
TROPONIN T SERPL-MCNC: 240 NG/L (ref 6–19)
TROPONIN T SERPL-MCNC: 286 NG/L (ref 6–19)
UNIT STATUS USTAT: NORMAL
WBC # BLD AUTO: 0.1 K/UL (ref 4.8–10.8)

## 2022-12-29 PROCEDURE — 99232 SBSQ HOSP IP/OBS MODERATE 35: CPT | Performed by: PEDIATRICS

## 2022-12-29 PROCEDURE — 82330 ASSAY OF CALCIUM: CPT | Mod: 91

## 2022-12-29 PROCEDURE — 85384 FIBRINOGEN ACTIVITY: CPT | Mod: 91

## 2022-12-29 PROCEDURE — 700105 HCHG RX REV CODE 258: Performed by: EMERGENCY MEDICINE

## 2022-12-29 PROCEDURE — 85576 BLOOD PLATELET AGGREGATION: CPT

## 2022-12-29 PROCEDURE — 85730 THROMBOPLASTIN TIME PARTIAL: CPT | Mod: 91

## 2022-12-29 PROCEDURE — 84484 ASSAY OF TROPONIN QUANT: CPT

## 2022-12-29 PROCEDURE — 85384 FIBRINOGEN ACTIVITY: CPT

## 2022-12-29 PROCEDURE — 86923 COMPATIBILITY TEST ELECTRIC: CPT

## 2022-12-29 PROCEDURE — 80048 BASIC METABOLIC PNL TOTAL CA: CPT

## 2022-12-29 PROCEDURE — 36430 TRANSFUSION BLD/BLD COMPNT: CPT

## 2022-12-29 PROCEDURE — C9113 INJ PANTOPRAZOLE SODIUM, VIA: HCPCS | Performed by: INTERNAL MEDICINE

## 2022-12-29 PROCEDURE — 770022 HCHG ROOM/CARE - ICU (200)

## 2022-12-29 PROCEDURE — 80053 COMPREHEN METABOLIC PANEL: CPT

## 2022-12-29 PROCEDURE — 86945 BLOOD PRODUCT/IRRADIATION: CPT | Mod: 91

## 2022-12-29 PROCEDURE — 85025 COMPLETE CBC W/AUTO DIFF WBC: CPT | Mod: 91

## 2022-12-29 PROCEDURE — 83605 ASSAY OF LACTIC ACID: CPT

## 2022-12-29 PROCEDURE — P9034 PLATELETS, PHERESIS: HCPCS

## 2022-12-29 PROCEDURE — P9016 RBC LEUKOCYTES REDUCED: HCPCS

## 2022-12-29 PROCEDURE — 700111 HCHG RX REV CODE 636 W/ 250 OVERRIDE (IP): Performed by: INTERNAL MEDICINE

## 2022-12-29 PROCEDURE — 71045 X-RAY EXAM CHEST 1 VIEW: CPT

## 2022-12-29 PROCEDURE — 700105 HCHG RX REV CODE 258: Performed by: STUDENT IN AN ORGANIZED HEALTH CARE EDUCATION/TRAINING PROGRAM

## 2022-12-29 PROCEDURE — 82962 GLUCOSE BLOOD TEST: CPT | Mod: 91

## 2022-12-29 PROCEDURE — 700105 HCHG RX REV CODE 258: Performed by: INTERNAL MEDICINE

## 2022-12-29 PROCEDURE — 99233 SBSQ HOSP IP/OBS HIGH 50: CPT | Performed by: INTERNAL MEDICINE

## 2022-12-29 PROCEDURE — 700111 HCHG RX REV CODE 636 W/ 250 OVERRIDE (IP): Performed by: STUDENT IN AN ORGANIZED HEALTH CARE EDUCATION/TRAINING PROGRAM

## 2022-12-29 PROCEDURE — 99292 CRITICAL CARE ADDL 30 MIN: CPT | Mod: GC | Performed by: INTERNAL MEDICINE

## 2022-12-29 PROCEDURE — 85347 COAGULATION TIME ACTIVATED: CPT

## 2022-12-29 PROCEDURE — 700111 HCHG RX REV CODE 636 W/ 250 OVERRIDE (IP): Performed by: EMERGENCY MEDICINE

## 2022-12-29 PROCEDURE — 99291 CRITICAL CARE FIRST HOUR: CPT | Mod: GC | Performed by: INTERNAL MEDICINE

## 2022-12-29 PROCEDURE — 85610 PROTHROMBIN TIME: CPT | Mod: 91

## 2022-12-29 RX ORDER — POTASSIUM CHLORIDE 29.8 MG/ML
40 INJECTION INTRAVENOUS ONCE
Status: COMPLETED | OUTPATIENT
Start: 2022-12-29 | End: 2022-12-29

## 2022-12-29 RX ORDER — FUROSEMIDE 10 MG/ML
20 INJECTION INTRAMUSCULAR; INTRAVENOUS ONCE
Status: COMPLETED | OUTPATIENT
Start: 2022-12-29 | End: 2022-12-29

## 2022-12-29 RX ADMIN — CEFEPIME 2 G: 2 INJECTION, POWDER, FOR SOLUTION INTRAVENOUS at 23:30

## 2022-12-29 RX ADMIN — HYDROMORPHONE HYDROCHLORIDE 0.6 MG: 1 INJECTION, SOLUTION INTRAMUSCULAR; INTRAVENOUS; SUBCUTANEOUS at 12:17

## 2022-12-29 RX ADMIN — HYDROMORPHONE HYDROCHLORIDE 0.6 MG: 1 INJECTION, SOLUTION INTRAMUSCULAR; INTRAVENOUS; SUBCUTANEOUS at 03:26

## 2022-12-29 RX ADMIN — POTASSIUM CHLORIDE 40 MEQ: 40 INJECTION INTRAVENOUS at 16:23

## 2022-12-29 RX ADMIN — PANTOPRAZOLE SODIUM 8 MG/HR: 40 INJECTION, POWDER, FOR SOLUTION INTRAVENOUS at 23:32

## 2022-12-29 RX ADMIN — THIAMINE HYDROCHLORIDE 500 MG: 100 INJECTION, SOLUTION INTRAMUSCULAR; INTRAVENOUS at 15:53

## 2022-12-29 RX ADMIN — FUROSEMIDE 20 MG: 10 INJECTION, SOLUTION INTRAMUSCULAR; INTRAVENOUS at 16:20

## 2022-12-29 RX ADMIN — HYDROMORPHONE HYDROCHLORIDE 0.6 MG: 1 INJECTION, SOLUTION INTRAMUSCULAR; INTRAVENOUS; SUBCUTANEOUS at 18:07

## 2022-12-29 RX ADMIN — HYDROMORPHONE HYDROCHLORIDE 0.6 MG: 1 INJECTION, SOLUTION INTRAMUSCULAR; INTRAVENOUS; SUBCUTANEOUS at 05:36

## 2022-12-29 RX ADMIN — HYDROCORTISONE SODIUM SUCCINATE 50 MG: 100 INJECTION, POWDER, FOR SOLUTION INTRAMUSCULAR; INTRAVENOUS at 17:36

## 2022-12-29 RX ADMIN — HYDROMORPHONE HYDROCHLORIDE 0.6 MG: 1 INJECTION, SOLUTION INTRAMUSCULAR; INTRAVENOUS; SUBCUTANEOUS at 01:30

## 2022-12-29 RX ADMIN — HYDROMORPHONE HYDROCHLORIDE 0.6 MG: 1 INJECTION, SOLUTION INTRAMUSCULAR; INTRAVENOUS; SUBCUTANEOUS at 23:30

## 2022-12-29 RX ADMIN — HYDROCORTISONE SODIUM SUCCINATE 50 MG: 100 INJECTION, POWDER, FOR SOLUTION INTRAMUSCULAR; INTRAVENOUS at 23:30

## 2022-12-29 RX ADMIN — VASOPRESSIN 0.03 UNITS/MIN: 20 INJECTION, SOLUTION INTRAMUSCULAR; SUBCUTANEOUS at 02:45

## 2022-12-29 RX ADMIN — PANTOPRAZOLE SODIUM 8 MG/HR: 40 INJECTION, POWDER, FOR SOLUTION INTRAVENOUS at 02:45

## 2022-12-29 RX ADMIN — CEFEPIME 2 G: 2 INJECTION, POWDER, FOR SOLUTION INTRAVENOUS at 13:23

## 2022-12-29 RX ADMIN — HYDROCORTISONE SODIUM SUCCINATE 50 MG: 100 INJECTION, POWDER, FOR SOLUTION INTRAMUSCULAR; INTRAVENOUS at 11:42

## 2022-12-29 RX ADMIN — PANTOPRAZOLE SODIUM 8 MG/HR: 40 INJECTION, POWDER, FOR SOLUTION INTRAVENOUS at 13:05

## 2022-12-29 RX ADMIN — CEFEPIME 2 G: 2 INJECTION, POWDER, FOR SOLUTION INTRAVENOUS at 05:37

## 2022-12-29 RX ADMIN — HYDROCORTISONE SODIUM SUCCINATE 50 MG: 100 INJECTION, POWDER, FOR SOLUTION INTRAMUSCULAR; INTRAVENOUS at 05:36

## 2022-12-29 ASSESSMENT — PAIN DESCRIPTION - PAIN TYPE
TYPE: ACUTE PAIN
TYPE: ACUTE PAIN;CHRONIC PAIN
TYPE: ACUTE PAIN;CHRONIC PAIN
TYPE: ACUTE PAIN
TYPE: ACUTE PAIN;CHRONIC PAIN
TYPE: ACUTE PAIN
TYPE: ACUTE PAIN;CHRONIC PAIN
TYPE: ACUTE PAIN
TYPE: ACUTE PAIN;CHRONIC PAIN
TYPE: ACUTE PAIN;CHRONIC PAIN
TYPE: ACUTE PAIN
TYPE: ACUTE PAIN

## 2022-12-29 ASSESSMENT — ENCOUNTER SYMPTOMS
BLURRED VISION: 0
DEPRESSION: 0
SPEECH CHANGE: 0
FEVER: 0
BACK PAIN: 1
HEADACHES: 0
VOMITING: 1
PALPITATIONS: 0
DOUBLE VISION: 0
COUGH: 0
DIZZINESS: 0
NAUSEA: 1
FLANK PAIN: 0
SHORTNESS OF BREATH: 0
CHILLS: 0
SENSORY CHANGE: 0

## 2022-12-29 ASSESSMENT — LIFESTYLE VARIABLES: SUBSTANCE_ABUSE: 0

## 2022-12-29 NOTE — PROGRESS NOTES
"Pediatric Hematology/Oncology  Daily Progress Note      Patient Name:  Tomas Jean-Baptiste  : 2000  MRN: [id number]    Location of Service:  [facility]  Date of Service: 2022  Time: 9:36 PM    Hospital Day: 2    Protocol / Treatment Plan: [place] Chromosome Precursor B-Cell Acute Lymphoblastic Leukemia, ON STUDY [id number], Delayed Intensification, Day 23    SUBJECTIVE:     Overall improved after aggressive resuscitation. Overnight required 15 L oxygen via HFLC. Received 40 mg lasix and had brisk diuresis. Now on 2 L oxygen via NC. Patient wanting to remove NG tube. No further bloody emesis. Had 2 dark brown stool late evening. Drinking sips of water. Still c/o leg pain which improves after dilaudid. Remains afebrile. Now blood culture growing E.Coli and Klebsiella. Discontinued vancomycin.     Review of Systems:     Constitutional: Critically ill but improving  HENT: Negative for auditory changes or ear pain, no congestion and rhinorrhea, no sore throat.  No mouth sores.  Eyes: Negative for visual changes.  Respiratory: Oxygen supplement via NC  Cardiovascular: Positive for leg swelling after fluid resuscitation  Gastrointestinal: Negative for nausea, vomiting, abdominal pain, diarrhea, constipation. Blood in stool. NG in place  Genitourinary: Negative for dysuria.  Musculoskeletal: Positive for back pain. Leg pain improving    Skin: Negative for rash or skin infection.  Neurological: Alert and oriented  Endo/Heme/Allergies: Bleeds easily   Psychiatric/Behavioral: anxious    OBJECTIVE:     Max Temp: Temp (24hrs), Av.4 °C (97.6 °F), Min:35.8 °C (96.5 °F), Max:37.7 °C (99.9 °F)      Vitals: /62   Pulse 79   Temp 36.3 °C (97.4 °F)   Resp (!) 22   Ht 1.664 m (5' 5.5\")   Wt 72.1 kg (158 lb 15.2 oz)   SpO2 99%   BMI 26.05 kg/m²     I/O:   Intake/Output Summary (Last 24 hours) at 2022 2136  Last data filed at 2022 2100  Gross per 24 hour   Intake 3991.52 ml "   Output 6625 ml   Net -2633.48 ml       Labs:    Most Recent Hematology Labs:    Lab Results   Component Value Date/Time    WBC 0.1 (LL) 12/28/2022 2030    HEMOGLOBIN 6.9 (L) 12/28/2022 2030    MCV 85.7 12/28/2022 2030    PLATELETCT 25 (L) 12/28/2022 2030    NEUTS 0.01 (LL) 12/28/2022 0900       Most Recent Metabolic Panel:    Lab Results   Component Value Date/Time    POTASSIUM 3.5 (L) 12/28/2022 1500    CHLORIDE 105 12/28/2022 1500    CO2 25 12/28/2022 1500    GLUCOSE 164 (H) 12/28/2022 1500    BUN 32 (H) 12/28/2022 1500    CREATININE 0.70 12/28/2022 1500    CALCIUM 6.6 (LL) 12/28/2022 1500    ANION 6.0 (L) 12/28/2022 1500     Component 1 d ago    Significant Indicator POS Positive (POS) P    Source BLD P    Site PERIPHERAL P    Culture Result Growth detected by Bactec instrument. 12/27/2022  21:19 Abnormal  P    Culture Result Escherichia coli Abnormal  P    Culture Result Klebsiella pneumoniae Abnormal       Physical Exam:    Constitutional: Much improved, pallor improved  HENT: Normocephalic and atraumatic. Alopecia.  No rhinorrhea. Dried lips, some crusted blood inside of the mouth noted  Eyes: Conjunctivae are normal. EOMI.   Neck: Normal range of motion of neck, no adenopathy.    Cardiovascular: HR much improved, regular rhythm.  2/6 systolic murmur. DP/radial pulses 2+, cap refill < 2 sec  Pulmonary/Chest: Effort normall. No respiratory distress. Symmetric expansion.  No crackles or wheezes.  Abdomen: Soft. Bowel sounds are normal. No distension and no mass. There is no hepatosplenomegaly.    Genitourinary:  Deferred  Musculoskeletal: Not assessed  Neurological: Alert and oriented to person and place.   Skin: Skin is warm, dry and pink. Pallor improved, bilateral LE slightly edematous   Psychiatric : Anxious    ASSESSMENT AND PLAN:     Tomas Jean-Baptiste is a 22 y.o. male with [place] Chromosome Precursor B-Cell Acute Lymphoblastic Leukemia who is receiving therapy per protocol [id number], on  study presents to the [facility] Emergency Department with gram negative septic shock and DIC. Culture now growing (within 7 hrs of drawing blood culture) E.Coli and Klebsiella. Remains on Cefepime. Critically ill requiring intensive care. Clinically improving after agressive management.    E.coli and Klebsiella sepsis/septic shock in an immunocompromised host:  - Blood culture from 12/27/2022: E.coli and Klebsiella  - Awaiting sensitivity  - Discontinue vancomycin, CONTINUE cefepime  - Wean pressors per CICU  - Continue stress dose hydrocortisone (changed to hydrocortisone 50 mg every 6 hrs)  - ID consulted for further input  - Lactic acidosis improving after aggressive resuscitation      Ph+ Precursor B-Cell Acute Lymphoblastic Leukemia, in MRD Remission: Receiving therapy   SHORTY SANCHEZ Arm B, Delayed Intensification, Day 23  - HOLD imatinib given patient critically ill  - Not due for any chemotherapy until Day 29 which will be delayed due to current tenuous clinical condition  - If no improvement in clinical status, will consider starting G-CSF vs granulocyte infusion  - HOLD apixaban that was started on Day 8 to prevent pegaspargase induced clots.  - HOLD Bactrim (for PJP prophylaxis) this weekend given patient severely myelosuppressed. Restart next weekend.     Therapy related pancytopenia:  - Transfuse to maintain hemoglobin >8 gm/dL or with symptoms/volume replacement for hypotension  - Transfuse to maintain platelets >30,000/microliters (given hematemesis/hematochezia) or with symptoms/volume replacement for hypotension  - Use irradiated/leukoreduced products  - CBC daily      DIC secondary to septic shock  - S/p FFP and cryoprecipitate   - Fibrinogen level WNL, PTT/PT still prolonged  - Continue to monitor DIC panel and replace products accordingly  - On IV protonix due to bloody emesis/stool     Rest of the management per ICU team. Discussed with ICU attending, patient's mother and  Dr. Faye. Patient remains critically ill.         Alyce Duenas MD  Pediatric Hematology / Oncology  [facility]  Cell.  [phone]  [facility]. [phone]

## 2022-12-29 NOTE — PROGRESS NOTES
Dr. Bernal and Dr. Garcia notified of critical labs, Platelets 11, WBC 0.01, H&H 7.8/22.3  Orders being placed.

## 2022-12-29 NOTE — PROGRESS NOTES
Infectious Disease Progress Note    Author: Nova Louis M.D. 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 Found also to be in septic shock:blood cultures Ecoli and Kleb   AF (99.9) WBC 0.1 ANC 0 tolerating abx remains weak and edematous  Labs Reviewed, Medications Reviewed, and Radiology Reviewed.    Review of Systems:  Review of Systems   Constitutional:  Negative for fever.   Cardiovascular:  Positive for leg swelling.   All other systems reviewed and are negative.    Hemodynamics:  Temp (24hrs), Av.6 °C (97.9 °F), Min:36.3 °C (97.3 °F), Max:37.7 °C (99.9 °F)  Temperature: 36.6 °C (97.9 °F)  Pulse  Av  Min: 67  Max: 179   Blood Pressure: 126/58       Physical Exam:  Physical Exam  Vitals and nursing note reviewed.   Constitutional:       General: He is not in acute distress.     Appearance: He is ill-appearing and toxic-appearing. He is not diaphoretic.   HENT:      Nose: No rhinorrhea.      Mouth/Throat:      Pharynx: No oropharyngeal exudate.   Eyes:      General: No scleral icterus.     Extraocular Movements: Extraocular movements intact.      Pupils: Pupils are equal, round, and reactive to light.   Cardiovascular:      Rate and Rhythm: Tachycardia present.   Pulmonary:      Effort: Pulmonary effort is normal. No respiratory distress.      Breath sounds: No stridor.   Abdominal:      General: There is no distension.      Palpations: Abdomen is soft.      Tenderness: There is no abdominal tenderness.   Musculoskeletal:         General: Swelling present.      Cervical back: Neck supple. No rigidity.   Skin:     Coloration: Skin is not jaundiced.      Findings: Bruising present.   Neurological:      General: No focal deficit present.      Mental Status: He is alert and oriented to person, place, and time.   Psychiatric:          Mood and Affect: Mood normal.         Behavior: Behavior normal.       Meds:    Current Facility-Administered Medications:     hydrocortisone sodium succinate PF    pantoprazole (PROTONIX) infusion    NORepinephrine    vasopressin (PITRESSIN) infusion    cefepime    thiamine    ondansetron    HYDROmorphone **OR** HYDROmorphone    insulin regular **AND** POC blood glucose manual result **AND** NOTIFY MD and PharmD **AND** Administer 20 grams of glucose (approximately 8 ounces of fruit juice) every 15 minutes PRN FSBG less than 70 mg/dL **AND** dextrose bolus    Labs:  Recent Labs     12/27/22  1346 12/27/22  1448 12/27/22  2042 12/28/22  0240 12/28/22  0900 12/28/22  1500 12/28/22  2030 12/29/22  0515 12/29/22  1145   WBC 0.3*   < > 0.2*   < > 0.2*   < > 0.1* 0.1* 0.1*   RBC 1.85*   < > 2.78*   < > 2.81*   < > 2.24* 2.72* 2.66*   HEMOGLOBIN 6.3*   < > 8.6*   < > 8.6*   < > 6.9* 8.1* 7.8*   HEMATOCRIT 19.7*   < > 25.5*   < > 24.5*   < > 19.2* 22.8* 22.3*   .5*   < > 91.7   < > 87.2   < > 85.7 83.8 83.8   MCH 34.1*   < > 30.9   < > 30.6   < > 30.8 29.8 29.3   RDW 63.3*   < > 53.0*   < > 55.8*   < > 53.9* 48.8 49.6   PLATELETCT 12*   < > 69*   < > 37*   < > 25* 40* 11*   MPV  --   --  11.2   < > 11.6   < > 10.5 10.6 10.1   NEUTSPOLYS 0.00*  --  0.00*  --  2.60*  --   --   --   --    LYMPHOCYTES 96.10*  --  90.90*  --  97.40*  --   --   --   --    MONOCYTES 3.90  --  0.00  --  0.00  --   --   --   --    EOSINOPHILS 0.00  --  9.10*  --  0.00  --   --   --   --    BASOPHILS 0.00  --  0.00  --  0.00  --   --   --   --    RBCMORPHOLO Present  --  Present  --  Present  --   --   --   --     < > = values in this interval not displayed.     Recent Labs     12/28/22  0900 12/28/22  1500 12/29/22  0240   SODIUM 142 136 137   POTASSIUM 3.8 3.5* 3.9   CHLORIDE 110 105 106   CO2 23 25 26   GLUCOSE 139* 164* 154*   BUN 37* 32* 29*     Recent Labs     12/27/22  1346 12/27/22  1448 12/28/22  0240 12/28/22  0900 12/28/22  1500  12/29/22  0240   ALBUMIN 1.9*  --  2.4*  --   --  1.9*   TBILIRUBIN 0.6  --  2.0*  --   --  1.7*   ALKPHOSPHAT 44  --  44  --   --  62   TOTPROTEIN 3.1*  --  3.8*  --   --  3.7*   ALTSGPT 163*  --  183*  --   --  184*   ASTSGOT 46*  --  209*  --   --  144*   CREATININE 1.29   < > 0.99 0.86 0.70 0.47*    < > = values in this interval not displayed.       Imaging:  CT-ABDOMEN-PELVIS WITH    Result Date: 12/27/2022 12/27/2022 1:47 PM HISTORY/REASON FOR EXAM:  Sepsis. Nausea and vomiting. Abdominal pain chemotherapy for cancer. TECHNIQUE/EXAM DESCRIPTION:   CT scan of the abdomen and pelvis with contrast. Contrast-enhanced helical scanning was obtained from the diaphragmatic domes through the pubic symphysis following the bolus administration of nonionic contrast without complication. 100 mL of Omnipaque 350 nonionic contrast was administered without complication. Low dose optimization technique was utilized for this CT exam including automated exposure control and adjustment of the mA and/or kV according to patient size. COMPARISON: No prior studies available. FINDINGS: Lower Chest: Unremarkable. Liver: Hepatic fatty infiltration. Spleen: Unremarkable. Pancreas: Unremarkable. Gallbladder: No calcified stones. Biliary: Nondilated. Adrenal glands: Normal. Kidneys: Unremarkable without hydronephrosis. Bowel: No obstruction or acute inflammation. Normal appendix. Lymph nodes: No adenopathy. Vasculature: Unremarkable. Peritoneum: Unremarkable without ascites. Musculoskeletal: No acute or destructive process. Pelvis: No adenopathy or free fluid.     1.  Hepatic steatosis. 2.  No acute inflammatory abnormality within the abdomen or pelvis.    CT-HEAD W/O    Result Date: 12/27/2022 12/27/2022 1:42 PM HISTORY/REASON FOR EXAM:  Mental status change, unknown cause. Chemotherapy TECHNIQUE/EXAM DESCRIPTION AND NUMBER OF VIEWS: CT of the head without contrast. The study was performed on a helical multidetector CT scanner.  Contiguous axial sections were obtained from the skull base through the vertex. Up to date radiation dose reduction adjustments have been utilized to meet ALARA standards for radiation dose reduction. COMPARISON:  None available FINDINGS: The calvariae and skull base are unremarkable. There are no extraaxial fluid collections. The ventricular system and basal cisterns are within normal limits. There are no areas of abnormal density in the brain substance. There are no hemorrhagic lesions.  There are no mass effects or shift of midline structures. The brainstem and posterior fossa structures are unremarkable. Paranasal sinuses in the field of view are unremarkable. Mastoids in the field of view are unremarkable.     Head CT without contrast within normal limits. No evidence of acute cerebral infarction, hemorrhage or mass lesion.     CT-MAXILLOFACIAL W/O PLUS RECONS    Result Date: 12/27/2022 12/27/2022 1:42 PM HISTORY/REASON FOR EXAM:  Maxillofacial pain. Blood in the mouth. TECHNIQUE/EXAMD DESCRIPTION AND NUMBER OF VIEWS: CT scan maxillofacial without contrast, with reconstructions. Thin-section helical imaging was obtained of the maxillofacial structures from the orbital roofs through the mandible. Coronal and sagittal multiplanar volume reformat images were generated from the axial data. Low dose optimization technique was utilized for this CT exam including automated exposure control and adjustment of the mA and/or kV according to patient size. COMPARISON: None. FINDINGS: The maxillofacial and orbital bony structures are unremarkable. The paranasal sinuses are normally developed. There is a small retention cyst at the alveolar recess of the right maxillary sinus. There are no air-fluid levels. The mastoids and middle ear cavities are clear. There is no significant cervical adenopathy in the field-of-view.     1.  Essentially negative maxillofacial sinus CT. Small retention cyst in the alveolar recess of the  right maxillary sinus of no clinical significance.    DX-CHEST-PORTABLE (1 VIEW)    Result Date: 12/29/2022 12/29/2022 1:08 AM HISTORY/REASON FOR EXAM:  Shortness of Breath. TECHNIQUE/EXAM DESCRIPTION AND NUMBER OF VIEWS: Single portable view of the chest. COMPARISON: 12/28/2022 1:53 AM FINDINGS: Cardiomediastinal contour is unchanged. Previously described pulmonary parenchymal opacities persist. No pneumothorax identified. Orogastric tube has been removed. RIGHT chest infusion port remains.     1.  Supportive tubing as described above. 2.  No other significant change from prior exam.     DX-CHEST-PORTABLE (1 VIEW)    Result Date: 12/28/2022 12/28/2022 2:11 AM HISTORY/REASON FOR EXAM:  Shortness of Breath. TECHNIQUE/EXAM DESCRIPTION AND NUMBER OF VIEWS: Single portable view of the chest. COMPARISON: 12/27/2022 FINDINGS: Cardiomediastinal contour is unchanged. Previously described pulmonary parenchymal opacities persist. No pneumothorax identified. Supportive tubing is unchanged.     No significant change from prior exam.    DX-CHEST-PORTABLE (1 VIEW)    Result Date: 12/28/2022 12/27/2022 11:46 PM HISTORY/REASON FOR EXAM:  Shortness of Breath. TECHNIQUE/EXAM DESCRIPTION AND NUMBER OF VIEWS: Single portable view of the chest. COMPARISON: 12/27/2022 FINDINGS: Cardiac mediastinal contour is unchanged. Lungs show hypoinflation. Ill-defined bilateral perihilar opacities, new from prior exam. Pulmonary vessels are prominent. No pleural fluid collection or pneumothorax. Orogastric tube tip at the proximal stomach. RIGHT chest infusion port present.     Worsening hypoinflation and bilateral perihilar infiltrates as well as prominence of the pulmonary vasculature suggesting pulmonary edema.  Superimposed pneumonia is not excluded.    DX-CHEST-PORTABLE (1 VIEW)    Result Date: 12/27/2022 12/27/2022 2:09 PM HISTORY/REASON FOR EXAM: Chest pain TECHNIQUE/EXAM DESCRIPTION AND NUMBER OF VIEWS: Single portable view of the chest.  COMPARISON: 2022 FINDINGS: There is a right subclavian Port-A-Cath. There is no evidence of focal consolidation or evidence of pulmonary edema. There is no pleural effusion. The heart is normal in size.     No evidence of acute cardiopulmonary process.    US-EXTREMITY VENOUS LOWER BILAT    Result Date: 2022   Vascular Laboratory  CONCLUSIONS  No deep venous thrombosis.  Distal subcutaneous fat edema.  REBECA CHEEMA  Exam Date:     2022 17:13  Room #:     Inpatient  Priority:     Stat  Ht (in):             Wt (lb):  Ordering Physician:        SANDI IGLESIAS  Referring Physician:       [id number]  Sonographer:               Palma Churchill RVVIOLA  Study Type:                Complete Bilateral  Technical Quality:         Adequate  Age:    22    Gender:     M  MRN:    [id number]  :    2000      BSA:  Indications:     Localized swelling, mass and lump, lower limb, bilateral  CPT Codes:       32919  ICD Codes:       R22.43  History:         Swelling/edema of legs, concern for pulmonary embolus. Pain.                     No prior duplex.  Limitations:  PROCEDURES:  Bilateral lower extremity venous duplex imaging.  The following venous structures were evaluated: common femoral, deep  femoral, proximal great saphenous, femoral, popliteal, peroneal, and  posterior tibial veins.  Serial compression, color, and spectral Doppler flow evaluations were  performed.  FINDINGS:  Bilateral lower extremities.  No deep venous thrombosis.  All veins demonstrate complete color filling and compressibility with  normal venous flow dynamics including spontaneous flow and respiratory  phasicity.  Interstitial fluid consistent with edema is observed below the knee.  Dorian Knowles  (Electronically Signed)  Final Date:      2022                   20:46    EC-ECHOCARDIOGRAM LTD W/O CONT    Result Date: 2022  Transthoracic Echo Report Echocardiography Laboratory CONCLUSIONS The left  ventricular ejection fraction is visually estimated to be 65%. Normal regional wall motion. REBECA CHEEMA Exam Date:         2022                    17:50 Exam Location:     Inpatient Priority:          Routine Ordering Physician:        SANDI IGLESIAS Referring Physician: Sonographer:               Tigist INTERIANO Age:    22     Gender:    M MRN:    [id number] :    2000 BSA:    1.82   Ht (in):    65     Wt (lb):    165 Exam Type:     Limited Indications:     Shortness of breath ICD Codes:       786.05 CPT Codes:       70945 BP:   128    /   57     HR:   160 Technical Quality:       Technically difficult study                          incomplete information is                          obtained MEASUREMENTS  (Male / Female) Normal Values 2D ECHO Estimated LV Ejection Fraction    65 %                  * Indicates values subject to auto-interpretation LV EF:  65    % FINDINGS Left Ventricle Normal left ventricular chamber size. Normal left ventricular systolic function. The left ventricular ejection fraction is visually estimated to be 65%. Normal regional wall motion. Diastolic function is difficult to assess with arrhythmia. Right Ventricle The right ventricle is not well visualized. Right Atrium The right atrium is not well visualized. Left Atrium The left atrium is not well visualized. Mitral Valve The mitral valve is not well visualized. Aortic Valve The aortic valve is not well visualized. Tricuspid Valve The tricuspid valve is not well visualized. Pulmonic Valve The pulmonic valve is not well visualized. Pericardium Normal pericardium without effusion. Aorta The ascending aorta is not well visualized. Mayito Howell MD (Electronically Signed) Final Date:     2022                 18:38      Micro:  Results       Procedure Component Value Units Date/Time    BLOOD CULTURE [[id number]]  (Abnormal) Collected: 22 1410    Order Status: Completed Specimen: Blood from  "Peripheral Updated: 12/29/22 0806     Significant Indicator POS     Source BLD     Site PERIPHERAL     Culture Result Growth detected by Bactec instrument. 12/27/2022  21:19      Escherichia coli  See previous culture for sensitivity report.        Klebsiella pneumoniae  See previous culture for sensitivity report.      Narrative:      CALL  Jenkins  161 tel. [phone],  CALLED  161 tel. [phone] 12/27/2022, 21:25, RB PERF. RESULTS CALLED TO: RN  [phone]  Per Hospital Policy: Only change Specimen Src: to \"Line\" if  specified by physician order.  Left AC    BLOOD CULTURE [[id number]]  (Abnormal)  (Susceptibility) Collected: 12/27/22 1345    Order Status: Completed Specimen: Blood from Peripheral Updated: 12/29/22 0806     Significant Indicator POS     Source BLD     Site PERIPHERAL     Culture Result Growth detected by Bactec instrument. 12/27/2022  21:20      Escherichia coli      Klebsiella pneumoniae    Narrative:      CALL  Jenkins  161 tel. [phone],  CALLED  161 tel. [phone] 12/27/2022, 21:24, RB PERF. RESULTS CALLED TO: RN  [phone]  Per Hospital Policy: Only change Specimen Src: to \"Line\" if  specified by physician order.  No site indicated    Susceptibility       Escherichia coli (1)       Antibiotic Interpretation Microscan   Method Status    Ampicillin Sensitive <=8 mcg/mL COLTEN Final    Ceftriaxone Sensitive <=1 mcg/mL COLTEN Final    Cefazolin Sensitive <=2 mcg/mL COLTEN Final    Ciprofloxacin Sensitive <=0.25 mcg/mL COLTEN Final    Cefepime Sensitive <=2 mcg/mL COLTEN Final    Cefuroxime Sensitive <=4 mcg/mL COLTEN Final    Ampicillin/sulbactam Sensitive <=4/2 mcg/mL COLTEN Final    Ertapenem Sensitive <=0.5 mcg/mL COLTEN Final    Tobramycin Intermediate 8 mcg/mL COLTEN Final    Gentamicin Sensitive <=2 mcg/mL COLTEN Final    Minocycline Sensitive <=4 mcg/mL COLTEN Final    Moxifloxacin Sensitive <=2 mcg/mL COLTEN Final    Pip/Tazobactam Sensitive <=8 mcg/mL COLTEN Final    Trimeth/Sulfa Resistant >2/38 mcg/mL COLTEN Final    Tigecycline Sensitive " <=2 mcg/mL COLTEN Final              Klebsiella pneumoniae (2)       Antibiotic Interpretation Microscan   Method Status    Ceftriaxone Sensitive <=1 mcg/mL COLTEN Final    Cefazolin Sensitive <=2 mcg/mL COLTEN Final    Ciprofloxacin Sensitive <=0.25 mcg/mL COLTEN Final    Cefepime Sensitive <=2 mcg/mL COLTEN Final    Cefuroxime Sensitive <=4 mcg/mL COLTEN Final    Ampicillin/sulbactam Sensitive <=4/2 mcg/mL COLTEN Final    Ertapenem Sensitive <=0.5 mcg/mL COLTEN Final    Tobramycin Intermediate 8 mcg/mL COLTEN Final    Gentamicin Sensitive <=2 mcg/mL COLTEN Final    Minocycline Intermediate 8 mcg/mL COLTEN Final    Moxifloxacin Sensitive <=2 mcg/mL COLTEN Final    Pip/Tazobactam Sensitive <=8 mcg/mL COLTEN Final    Trimeth/Sulfa Resistant >2/38 mcg/mL COLTEN Final    Tigecycline Sensitive <=2 mcg/mL COLTEN Final                       URINE CULTURE(NEW) [[id number]] Collected: 12/27/22 1512    Order Status: Completed Specimen: Urine Updated: 12/29/22 0730     Significant Indicator NEG     Source UR     Site -     Culture Result No growth at 48 hours.    Narrative:      Indication for culture:->Emergency Room Patient  Indication for culture:->Emergency Room Patient    S. Aureus By PCR, Nasal Complete [[id number]] Collected: 12/27/22 2105    Order Status: Completed Specimen: Respirate from Respiratory Updated: 12/28/22 0109     Staph aureus by PCR Negative     MRSA by PCR Negative    Narrative:      Collected By: [id number] JASON DELATORRE    CoV-2, FLU A/B, and RSV by PCR (2-4 Hours CEPHEID) : Collect NP swab in VTM [[id number]] Collected: 12/27/22 1500    Order Status: Completed Specimen: Respirate Updated: 12/27/22 1639     Influenza virus A RNA Negative     Influenza virus B, PCR Negative     RSV, PCR Negative     SARS-CoV-2 by PCR NotDetected     Comment: [facility] providers: PLEASE REFER TO DE-ESCALATION AND RETESTING PROTOCOL  on [email or web address]    **The [employer] GeneXpert Xpress SARS-CoV-2 RT-PCR Test has been made  available for use under the Emergency Use  Authorization (EUA) only.          SARS-CoV-2 Source NP Swab    URINALYSIS [[id number]]  (Abnormal) Collected: 12/27/22 1512    Order Status: Completed Specimen: Urine Updated: 12/27/22 1544     Color Yellow     Character Clear     Specific Gravity >=1.045     Ph 5.0     Glucose Negative mg/dL      Ketones Negative mg/dL      Protein Negative mg/dL      Bilirubin Negative     Urobilinogen, Urine 0.2     Nitrite Negative     Leukocyte Esterase Negative     Occult Blood Trace     Micro Urine Req Microscopic    Narrative:      Indication for culture:->Emergency Room Patient            Assessment:  Active Hospital Problems    Diagnosis     *Shock (HCC) [R57.9]     Gram-negative bacteremia [R78.81]     DIC (disseminated intravascular coagulation) (HCC) [D65]     At high risk for venous thromboembolism (VTE) [Z91.89]     Acute kidney injury (HCC) [N17.9]     High anion gap metabolic acidosis [E87.29]     Lactic acid acidosis [E87.20]     Hyperglycemia [R73.9]     Neutropenic sepsis (HCC) [A41.9, D70.9]     Upper GI bleed [K92.2]     Hyperkalemia [E87.5]     Hypocalcemia [E83.51]     Elevated troponin [R77.8]     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

## 2022-12-29 NOTE — CARE PLAN
The patient is Watcher - Medium risk of patient condition declining or worsening    Shift Goals  Clinical Goals: wean pressors  Patient Goals: drink water, d/c NG tube  Family Goals: improvement of labs, hemodynamic stabilty      Problem: Knowledge Deficit - Standard  Goal: Patient and family/care givers will demonstrate understanding of plan of care, disease process/condition, diagnostic tests and medications  Outcome: Progressing     Problem: Pain - Standard  Goal: Alleviation of pain or a reduction in pain to the patient’s comfort goal  Outcome: Progressing     Problem: Skin Integrity  Goal: Skin integrity is maintained or improved  Outcome: Progressing     Problem: Psychosocial  Goal: Patient's level of anxiety will decrease  Outcome: Progressing     Problem: Hemodynamics  Goal: Patient's hemodynamics, fluid balance and neurologic status will be stable or improve  Outcome: Progressing

## 2022-12-29 NOTE — PROGRESS NOTES
[facility] Progress Note      Admit Date: 12/27/2022    Resident(s): Milo Garcia M.D.   Attending:  EDMUND BONILLA/ Dr. Bernal    Patient ID:    Name:  Tomas Jean-Baptiste   YOB: 2000  Age:  22 y.o.  male   MRN:  [id number]    Hospital Course (carried forward and updated):  Tomas Jean-Baptiste is a 22 y.o. male with has a history of B cell acute lymphoblastic leukemia.  He has been on imatinib therapy since June 2022, and last received chemotherapy on 12/20/2022, which included vincristine, doxorubicin, Dexrazoxane, and PEG-aspargase.  He was then placed on a 7-day course of dexamethasone 9 mg oral twice daily, and did take famotidine for GI prophylaxis with this.  He also continued taking his imatinib.  And due to being at risk for thrombotic events from the PEG-aspargase, he was restarted 2 weeks before admission on apixaban 5 mg p.o. twice daily which she was taking at home up until presentation.        Presentation is most consistent with DIC. Hematology/oncology consulted. Patient did vomit a liter of blood from his stomach. Gastroenterology was notified, and opinion is that this is likely hemorrhagic gastritis from DIC and that there is no benefit in endoscopic intervention. Patient received 4 unit prbc, 2 unit platelet 2 unit cryo, his laboratories considerably improved after resus. Patient admitted to ICU for further management.    12/28 Patient NG tube removed, tolerating diet so far, advancing slowly. Laboratories considerably improving after overnight colloid resus. Clinically appears improving as well. Vancomycin discontinued, ID consulted for gram negative bacteremia. PPI drip changed to BID IV.    Consultants:  Critical Care  Oncology  Infectious disease     Interval Events:    Overnight required 2 unit prbc, 1 unit platelet 1 unit ffp for large bloody bowel movement and change in laboratories, clinically responded well with improvement weakness symptoms and muscle  weakness improvement. Additionally resumed PPI drip at that time. Discussed case in AM with oncology, may need to protect platelets of 50. Continues to have maroon colored stool this morning.      Vitals Range last 24h:  Temp:  [35.8 °C (96.5 °F)-37.7 °C (99.9 °F)] 37.2 °C (99 °F)  Pulse:  [] 107  Resp:  [11-41] 16  BP: ()/(47-80) 128/72  SpO2:  [88 %-100 %] 100 %      Intake/Output Summary (Last 24 hours) at 12/29/2022 1144  Last data filed at 12/29/2022 1034  Gross per 24 hour   Intake 2771.17 ml   Output 2850 ml   Net -78.83 ml          Review of Systems   Constitutional:  Positive for malaise/fatigue. Negative for chills.   HENT:  Negative for hearing loss.    Eyes:  Negative for blurred vision and double vision.   Respiratory:  Negative for cough and shortness of breath.    Cardiovascular:  Negative for chest pain and palpitations.   Gastrointestinal:  Positive for melena, nausea and vomiting.   Genitourinary:  Negative for flank pain and frequency.   Musculoskeletal:  Positive for back pain.   Skin:  Negative for rash.   Neurological:  Negative for dizziness, sensory change, speech change and headaches.   Psychiatric/Behavioral:  Negative for depression and substance abuse.      PHYSICAL EXAM:  Vitals:    12/29/22 1000 12/29/22 1015 12/29/22 1030 12/29/22 1045   BP: 99/55 102/51 107/56 128/72   Pulse: (!) 111 (!) 101 100 (!) 107   Resp: 15 14 16 16   Temp:   37.2 °C (99 °F)    TempSrc:   Temporal    SpO2: 98% 98% 99% 100%   Weight:       Height:        Body mass index is 26.05 kg/m².    O2 therapy: Pulse Oximetry: 100 %, O2 (LPM): 4, O2 Delivery Device: Silicone Nasal Cannula    Date 12/29/22 0700 - 12/30/22 0659   Shift 4363-2092 5158-9271 0879-1096 24 Hour Total   INTAKE   P.O. 50   50     P.O. 50   50   I.V. 44.3   44.3     Vasopressin Volume 22.8   22.8     Norepinephrine Volume 21.5   21.5   IV Piggyback 114.2   114.2     Volume (mL) (pantoprazole (Protonix) 80 mg in  mL Infusion) 114.2    114.2   Shift Total 208.4   208.4   OUTPUT   Stool         Number of Times Stooled 2 x   2 x   Shift Total       .4   208.4          Physical Exam  Constitutional:       Comments: No acute distress, somewhat tired and pale appearance but perks up when he sits up.   HENT:      Right Ear: There is no impacted cerumen.      Nose: No congestion.      Mouth/Throat:      Mouth: Mucous membranes are moist.      Pharynx: Oropharynx is clear. No oropharyngeal exudate.   Eyes:      General: No scleral icterus.     Pupils: Pupils are equal, round, and reactive to light.   Cardiovascular:      Rate and Rhythm: Normal rate and regular rhythm.   Pulmonary:      Effort: No respiratory distress.      Breath sounds: No wheezing.   Abdominal:      General: There is no distension.      Tenderness: There is no abdominal tenderness. There is no guarding.   Skin:     General: Skin is warm.      Coloration: Skin is pale. Skin is not jaundiced.      Findings: No bruising or erythema.   Neurological:      Mental Status: He is alert.       Recent Labs     12/27/22  1420   ISTATAPH 7.242*   ISTATAPCO2 19.0*   ISTATAPO2 122*   ISTATATCO2 <10*   FHKTAKX5FHG 98   ISTATARTHCO3 8.2*   ISTATARTBE -18*   ISTATTEMP 36.8 C   ISTATFIO2 100   ISTATSPEC Arterial   ISTATAPHTC 7.244*   VJOYBHCB9DU 121*       Recent Labs     12/27/22  1620 12/27/22  2042 12/28/22  0900 12/28/22  1500 12/29/22  0240   SODIUM  --    < > 142 136 137   POTASSIUM  --    < > 3.8 3.5* 3.9   CHLORIDE  --    < > 110 105 106   CO2  --    < > 23 25 26   BUN  --    < > 37* 32* 29*   CREATININE  --    < > 0.86 0.70 0.47*   MAGNESIUM 2.1  --   --   --   --    PHOSPHORUS 3.6  --   --   --   --    CALCIUM  --    < > 7.0* 6.6* 7.4*    < > = values in this interval not displayed.       Recent Labs     12/27/22  1346 12/27/22  1448 12/28/22  0240 12/28/22  0900 12/28/22  1500 12/29/22  0240   ALTSGPT 163*  --  183*  --   --  184*   ASTSGOT 46*  --  209*  --   --  144*   ALKPHOSPHAT 44   --  44  --   --  62   TBILIRUBIN 0.6  --  2.0*  --   --  1.7*   GLUCOSE 311*   < > 71 139* 164* 154*    < > = values in this interval not displayed.       Recent Labs     12/28/22 1500 12/28/22 2030 12/29/22 0240 12/29/22  0515   RBC 2.49* 2.24*  --  2.72*   HEMOGLOBIN 7.6* 6.9*  --  8.1*   HEMATOCRIT 21.6* 19.2*  --  22.8*   PLATELETCT 14* 25*  --  40*   PROTHROMBTM 20.2*  --  18.7* 18.3*   APTT 38.7* 41.4* 39.8* 39.5*   INR 1.77*  --  1.60* 1.56*       Recent Labs     12/27/22  1346 12/27/22  1448 12/27/22 2042 12/28/22 0240 12/28/22  0900 12/28/22 1500 12/28/22 2030 12/29/22 0240 12/29/22  0515   WBC 0.3*   < > 0.2* 0.3* 0.2* 0.1* 0.1*  --  0.1*   NEUTSPOLYS 0.00*  --  0.00*  --  2.60*  --   --   --   --    LYMPHOCYTES 96.10*  --  90.90*  --  97.40*  --   --   --   --    MONOCYTES 3.90  --  0.00  --  0.00  --   --   --   --    EOSINOPHILS 0.00  --  9.10*  --  0.00  --   --   --   --    BASOPHILS 0.00  --  0.00  --  0.00  --   --   --   --    ASTSGOT 46*  --   --  209*  --   --   --  144*  --    ALTSGPT 163*  --   --  183*  --   --   --  184*  --    ALKPHOSPHAT 44  --   --  44  --   --   --  62  --    TBILIRUBIN 0.6  --   --  2.0*  --   --   --  1.7*  --     < > = values in this interval not displayed.         Meds:   hydrocortisone sodium succinate PF  50 mg      pantoprazole (PROTONIX) infusion  8 mg/hr 8 mg/hr (12/29/22 1034)    NORepinephrine  0-1 mcg/kg/min (Ideal) 0.04 mcg/kg/min (12/29/22 1034)    vasopressin (PITRESSIN) infusion  0.03 Units/min Stopped (12/29/22 0832)    cefepime  2 g Stopped (12/29/22 0607)    thiamine  500 mg 500 mg (12/28/22 6337)    ondansetron  4 mg      HYDROmorphone  0.4 mg      Or    HYDROmorphone  0.6 mg      insulin regular  1-6 Units      And    dextrose bolus  25 g 25 g (12/28/22 9286)        Procedures:      Imaging:  DX-CHEST-PORTABLE (1 VIEW)   Final Result      1.  Supportive tubing as described above.   2.  No other significant change from prior exam.          DX-CHEST-PORTABLE (1 VIEW)   Final Result      No significant change from prior exam.      DX-CHEST-PORTABLE (1 VIEW)   Final Result      Worsening hypoinflation and bilateral perihilar infiltrates as well as prominence of the pulmonary vasculature suggesting pulmonary edema.  Superimposed pneumonia is not excluded.      EC-ECHOCARDIOGRAM LTD W/O CONT   Final Result      US-EXTREMITY VENOUS LOWER BILAT   Final Result      CT-ABDOMEN-PELVIS WITH   Final Result      1.  Hepatic steatosis.   2.  No acute inflammatory abnormality within the abdomen or pelvis.      CT-MAXILLOFACIAL W/O PLUS RECONS   Final Result      1.  Essentially negative maxillofacial sinus CT. Small retention cyst in the alveolar recess of the right maxillary sinus of no clinical significance.      CT-HEAD W/O   Final Result      Head CT without contrast within normal limits. No evidence of acute cerebral infarction, hemorrhage or mass lesion.         DX-CHEST-PORTABLE (1 VIEW)   Final Result      No evidence of acute cardiopulmonary process.          ASSESSEMENT and PLAN:    * Shock (HCC)- (present on admission)  Assessment & Plan  - Hemorrhagic shock from DIC  -Hemorrhagic gastritis evident  -NG tube dc'd  -Discussed platelet target with oncology, they deferred to ICU expertise, saying oftentimes quote transfuse below 10, but says they would have a goal of at least 30 in his case. Given possible active bleeding may need to protect for 50, closely following repeat labs.   -Continue fibrinogen levels  -If evidence of developing volume overload, would begin albumin and IV Lasix   -GI has been consulted, there is no benefit from an endoscopic interventions and hemorrhagic gastritis from DIC  -IV Protonix drip resumed.  -Vasopressor support to target titrate to MAP greater than 65    Elevated troponin  Assessment & Plan  - Likely demand ischemia from severe shock  -No ischemic changes noted on EKG    Hypocalcemia  Assessment & Plan  -Replete calcium  less than or equal to 1    Hyperkalemia  Assessment & Plan  - Noted in the setting of DIC  -Replete as necessary    Upper GI bleed  Assessment & Plan  - Upon admission patient noted to vomit almost a liter of blood  -Per GI opinion, no benefit in endoscopy  -Treat DIC and hemorrhagic shock with supportive care  -NG tube discontinued  -Protonix drip resumed    Neutropenic sepsis (HCC)  Assessment & Plan  This is Severe Sepsis Present on admission  SIRS criteria identified on my evaluation include: Tachycardia, with heart rate greater than 90 BPM, Tachypnea, with respirations greater than 20 per minute and Leukopenia, with WBC less than 4,000  Clinical indicators of end organ dysfunction include Systolic blood pressure (SBP) <90 mmHg or mean arterial pressure <65 mmHg  Source is unknown  Sepsis protocol initiated  Crystalloid Fluid Administration: Fluid resuscitation ordered per standard protocol - 30 mL/kg per current or ideal body weight  IV antibiotics as appropriate for source of sepsis  Reassessment: I have reassessed the patient's hemodynamic status    -Follow-up blood cultures, urine cultures  -CT head, maxillofacial, abdomen/pelvis without any clear source of infection. Possible gastric or esophageal translocation, other sources possible but not revealing themselves.  -Urinalysis also does not appear infected  -Klebsiella and e coli bacteremia, multiple susceptibilities, discussed with ID pharm and de-escalating to ancef from cefepime. Appreciate ID recs.  -May require port removal, investigation, replacement per ID recs. In acute setting deferring this for now, continue antibiotic management.  -Discontinued vancomycin - cultures growing gram negative rods.  -Stress dose steroids initiated - continue    Hyperglycemia  Assessment & Plan  - Recent steroids for the last 2 weeks  -LDSSI plus hypoglycemia protocol    Lactic acid acidosis  Assessment & Plan  - High-dose IV thiamine  -Trend    High anion gap  "metabolic acidosis  Assessment & Plan  - In the setting of severe hypovolemia in the setting of hemorrhagic shock and acute kidney injury  -Very significant lactic acidosis also contributing to high anion gap  -VBG with decently compensated metabolic acidosis  -No need for bicarb drip at this time, but low threshold to initiate if evidence of worsening acidosis  -Trend lactic acid    Acute kidney injury (HCC)  Assessment & Plan  - Urinalysis bland without proteinuria or hematuria  -Most likely prerenal  -Fluid resuscitation with blood products as above  -Strict I's/O  -Avoid contrast where possible  -Avoid nephrotoxins    At high risk for venous thromboembolism (VTE)  Assessment & Plan  - Hold home apixaban    DIC (disseminated intravascular coagulation) ([facility])  Assessment & Plan  - Fibrinogen less than 60, no evidence of hemolysis on labs  -Hemolysis labs have been ordered and pending, but are expected to be negative given that total bilirubin is normal  -Pancytopenia in all cell lines and PTT/INR/PT all elevated also consistent with DIC  -Supportive care with transfusions of PRBC/platelets/FFP as detailed in \"Shock\"  -Heme/onc consulted  -Continue to trend fibrinogen  -Hold home Eliquis  -Pain control with IV narcotics prn ordered    Acute lymphoblastic leukemia (ALL) ([facility])  Assessment & Plan  - Last chemotherapy treatment on 12/20/2022, where patient received vincristine, doxorubicin, Dexrazoxane, and PEG-aspargase  -He has been on apixaban for the last 2 weeks due to risk for PEG-aspargase related thrombotic events  -Hold apixaban in setting of hemorrhagic shock from DIC  -He was also on steroids which could have exacerbated his hemorrhagic gastritis  -IV Protonix drip  -heme/onc consulted        DISPO: ICU    CODE STATUS: FULL    Quality Measures:  Feeding: Advance to regular as tolerated  Analgesia: Prn dilaudid  Sedation: -  Thromboprophylaxis: -  Head of bed: >30 degrees  Ulcer prophylaxis: " Pantoprazole  Glycemic control: Correctional: ISS / Basal: -  Bowel care: bowel regimen prn  Indwelling lines: PIV  Deescalation of antibiotics: Cefepime      Milo Garcia M.D.

## 2022-12-30 ENCOUNTER — APPOINTMENT (OUTPATIENT)
Dept: RADIOLOGY | Facility: MEDICAL CENTER | Age: 22
DRG: 871 | End: 2022-12-30
Attending: INTERNAL MEDICINE
Payer: COMMERCIAL

## 2022-12-30 PROBLEM — J96.01 ACUTE RESPIRATORY FAILURE WITH HYPOXIA (HCC): Status: ACTIVE | Noted: 2022-12-30

## 2022-12-30 LAB
ALBUMIN SERPL BCP-MCNC: 2 G/DL (ref 3.2–4.9)
ALBUMIN SERPL BCP-MCNC: 2.2 G/DL (ref 3.2–4.9)
ALBUMIN/GLOB SERPL: 0.9 G/DL
ALBUMIN/GLOB SERPL: 1 G/DL
ALP SERPL-CCNC: 145 U/L (ref 30–99)
ALP SERPL-CCNC: 147 U/L (ref 30–99)
ALT SERPL-CCNC: 164 U/L (ref 2–50)
ALT SERPL-CCNC: 172 U/L (ref 2–50)
ANION GAP SERPL CALC-SCNC: 4 MMOL/L (ref 7–16)
ANION GAP SERPL CALC-SCNC: 5 MMOL/L (ref 7–16)
ANION GAP SERPL CALC-SCNC: 6 MMOL/L (ref 7–16)
ANION GAP SERPL CALC-SCNC: 6 MMOL/L (ref 7–16)
ANION GAP SERPL CALC-SCNC: 7 MMOL/L (ref 7–16)
ANION GAP SERPL CALC-SCNC: 8 MMOL/L (ref 7–16)
APTT PPP: 33.4 SEC (ref 24.7–36)
APTT PPP: 33.7 SEC (ref 24.7–36)
APTT PPP: 35.6 SEC (ref 24.7–36)
APTT PPP: 35.8 SEC (ref 24.7–36)
AST SERPL-CCNC: 77 U/L (ref 12–45)
AST SERPL-CCNC: 87 U/L (ref 12–45)
BARCODED ABORH UBTYP: 5100
BARCODED ABORH UBTYP: 6200
BARCODED ABORH UBTYP: 7300
BARCODED PRD CODE UBPRD: NORMAL
BARCODED UNIT NUM UBUNT: NORMAL
BILIRUB SERPL-MCNC: 2.2 MG/DL (ref 0.1–1.5)
BILIRUB SERPL-MCNC: 2.5 MG/DL (ref 0.1–1.5)
BUN SERPL-MCNC: 14 MG/DL (ref 8–22)
BUN SERPL-MCNC: 14 MG/DL (ref 8–22)
BUN SERPL-MCNC: 15 MG/DL (ref 8–22)
BUN SERPL-MCNC: 16 MG/DL (ref 8–22)
BUN SERPL-MCNC: 16 MG/DL (ref 8–22)
BUN SERPL-MCNC: 18 MG/DL (ref 8–22)
CA-I SERPL-SCNC: 1.1 MMOL/L (ref 1.1–1.3)
CALCIUM ALBUM COR SERPL-MCNC: 9.2 MG/DL (ref 8.5–10.5)
CALCIUM ALBUM COR SERPL-MCNC: 9.4 MG/DL (ref 8.5–10.5)
CALCIUM SERPL-MCNC: 7.6 MG/DL (ref 8.5–10.5)
CALCIUM SERPL-MCNC: 7.7 MG/DL (ref 8.5–10.5)
CALCIUM SERPL-MCNC: 7.8 MG/DL (ref 8.5–10.5)
CALCIUM SERPL-MCNC: 7.8 MG/DL (ref 8.5–10.5)
CFT BLD TEG: 9.7 MIN (ref 4.6–9.1)
CFT P HPASE BLD TEG: 13.4 MIN (ref 4.3–8.3)
CHLORIDE SERPL-SCNC: 102 MMOL/L (ref 96–112)
CHLORIDE SERPL-SCNC: 105 MMOL/L (ref 96–112)
CHLORIDE SERPL-SCNC: 107 MMOL/L (ref 96–112)
CLOT ANGLE BLD TEG: 72.6 DEGREES (ref 63–78)
CLOT LYSIS 30M P MA LENFR BLD TEG: 0.3 % (ref 0–2.6)
CO2 SERPL-SCNC: 28 MMOL/L (ref 20–33)
CO2 SERPL-SCNC: 29 MMOL/L (ref 20–33)
CO2 SERPL-SCNC: 30 MMOL/L (ref 20–33)
CO2 SERPL-SCNC: 31 MMOL/L (ref 20–33)
CO2 SERPL-SCNC: 31 MMOL/L (ref 20–33)
CO2 SERPL-SCNC: 32 MMOL/L (ref 20–33)
COMPONENT P 8504P: NORMAL
COMPONENT P 8504P: NORMAL
COMPONENT R 8504R: NORMAL
CREAT SERPL-MCNC: 0.3 MG/DL (ref 0.5–1.4)
CREAT SERPL-MCNC: 0.3 MG/DL (ref 0.5–1.4)
CREAT SERPL-MCNC: 0.33 MG/DL (ref 0.5–1.4)
CREAT SERPL-MCNC: 0.36 MG/DL (ref 0.5–1.4)
CREAT SERPL-MCNC: 0.39 MG/DL (ref 0.5–1.4)
CREAT SERPL-MCNC: 0.4 MG/DL (ref 0.5–1.4)
CT.EXTRINSIC BLD ROTEM: 1.3 MIN (ref 0.8–2.1)
ERYTHROCYTE [DISTWIDTH] IN BLOOD BY AUTOMATED COUNT: 48.4 FL (ref 35.9–50)
ERYTHROCYTE [DISTWIDTH] IN BLOOD BY AUTOMATED COUNT: 48.6 FL (ref 35.9–50)
ERYTHROCYTE [DISTWIDTH] IN BLOOD BY AUTOMATED COUNT: 49.1 FL (ref 35.9–50)
ERYTHROCYTE [DISTWIDTH] IN BLOOD BY AUTOMATED COUNT: 51.4 FL (ref 35.9–50)
FIBRINOGEN PPP-MCNC: 669 MG/DL (ref 215–460)
FIBRINOGEN PPP-MCNC: 680 MG/DL (ref 215–460)
FIBRINOGEN PPP-MCNC: 694 MG/DL (ref 215–460)
GFR SERPLBLD CREATININE-BSD FMLA CKD-EPI: 159 ML/MIN/1.73 M 2
GFR SERPLBLD CREATININE-BSD FMLA CKD-EPI: 160 ML/MIN/1.73 M 2
GFR SERPLBLD CREATININE-BSD FMLA CKD-EPI: 164 ML/MIN/1.73 M 2
GFR SERPLBLD CREATININE-BSD FMLA CKD-EPI: 168 ML/MIN/1.73 M 2
GFR SERPLBLD CREATININE-BSD FMLA CKD-EPI: 173 ML/MIN/1.73 M 2
GFR SERPLBLD CREATININE-BSD FMLA CKD-EPI: 173 ML/MIN/1.73 M 2
GLOBULIN SER CALC-MCNC: 2.3 G/DL (ref 1.9–3.5)
GLOBULIN SER CALC-MCNC: 2.3 G/DL (ref 1.9–3.5)
GLUCOSE BLD STRIP.AUTO-MCNC: 120 MG/DL (ref 65–99)
GLUCOSE BLD STRIP.AUTO-MCNC: 140 MG/DL (ref 65–99)
GLUCOSE BLD STRIP.AUTO-MCNC: 156 MG/DL (ref 65–99)
GLUCOSE BLD STRIP.AUTO-MCNC: 209 MG/DL (ref 65–99)
GLUCOSE SERPL-MCNC: 128 MG/DL (ref 65–99)
GLUCOSE SERPL-MCNC: 131 MG/DL (ref 65–99)
GLUCOSE SERPL-MCNC: 145 MG/DL (ref 65–99)
GLUCOSE SERPL-MCNC: 162 MG/DL (ref 65–99)
GLUCOSE SERPL-MCNC: 168 MG/DL (ref 65–99)
GLUCOSE SERPL-MCNC: 172 MG/DL (ref 65–99)
HCT VFR BLD AUTO: 22.5 % (ref 42–52)
HCT VFR BLD AUTO: 23.5 % (ref 42–52)
HCT VFR BLD AUTO: 24.6 % (ref 42–52)
HCT VFR BLD AUTO: 24.8 % (ref 42–52)
HCT VFR BLD AUTO: 25.4 % (ref 42–52)
HCT VFR BLD AUTO: 28.8 % (ref 42–52)
HGB BLD-MCNC: 10.1 G/DL (ref 14–18)
HGB BLD-MCNC: 7.9 G/DL (ref 14–18)
HGB BLD-MCNC: 8.3 G/DL (ref 14–18)
HGB BLD-MCNC: 8.5 G/DL (ref 14–18)
HGB BLD-MCNC: 8.6 G/DL (ref 14–18)
HGB BLD-MCNC: 8.9 G/DL (ref 14–18)
INR PPP: 1.28 (ref 0.87–1.13)
INR PPP: 1.36 (ref 0.87–1.13)
INR PPP: 1.37 (ref 0.87–1.13)
INR PPP: 1.4 (ref 0.87–1.13)
LACTATE SERPL-SCNC: 0.7 MMOL/L (ref 0.5–2)
LACTATE SERPL-SCNC: 0.9 MMOL/L (ref 0.5–2)
LACTATE SERPL-SCNC: 1 MMOL/L (ref 0.5–2)
LACTATE SERPL-SCNC: 1.2 MMOL/L (ref 0.5–2)
LACTATE SERPL-SCNC: 1.4 MMOL/L (ref 0.5–2)
MAGNESIUM SERPL-MCNC: 1.9 MG/DL (ref 1.5–2.5)
MCF BLD TEG: 60.2 MM (ref 52–69)
MCF.PLATELET INHIB BLD ROTEM: 42.6 MM (ref 15–32)
MCH RBC QN AUTO: 28.5 PG (ref 27–33)
MCH RBC QN AUTO: 28.8 PG (ref 27–33)
MCH RBC QN AUTO: 28.9 PG (ref 27–33)
MCH RBC QN AUTO: 29 PG (ref 27–33)
MCH RBC QN AUTO: 29.1 PG (ref 27–33)
MCH RBC QN AUTO: 29.5 PG (ref 27–33)
MCHC RBC AUTO-ENTMCNC: 34.3 G/DL (ref 33.7–35.3)
MCHC RBC AUTO-ENTMCNC: 35 G/DL (ref 33.7–35.3)
MCHC RBC AUTO-ENTMCNC: 35 G/DL (ref 33.7–35.3)
MCHC RBC AUTO-ENTMCNC: 35.1 G/DL (ref 33.7–35.3)
MCHC RBC AUTO-ENTMCNC: 35.1 G/DL (ref 33.7–35.3)
MCHC RBC AUTO-ENTMCNC: 35.3 G/DL (ref 33.7–35.3)
MCV RBC AUTO: 81.4 FL (ref 81.4–97.8)
MCV RBC AUTO: 82.2 FL (ref 81.4–97.8)
MCV RBC AUTO: 82.5 FL (ref 81.4–97.8)
MCV RBC AUTO: 82.6 FL (ref 81.4–97.8)
MCV RBC AUTO: 82.7 FL (ref 81.4–97.8)
MCV RBC AUTO: 86.1 FL (ref 81.4–97.8)
NEUTROPHILS # BLD AUTO: ABNORMAL K/UL (ref 1.82–7.42)
NEUTROPHILS # BLD AUTO: ABNORMAL K/UL (ref 1.82–7.42)
NRBC # BLD AUTO: 0.04 K/UL
NRBC # BLD AUTO: 0.04 K/UL
NRBC # BLD AUTO: 0.05 K/UL
NRBC # BLD AUTO: 0.06 K/UL
NRBC BLD-RTO: 44.4 /100 WBC
NRBC BLD-RTO: 50 /100 WBC
NRBC BLD-RTO: 55.6 /100 WBC
NRBC BLD-RTO: 60 /100 WBC
PA AA BLD-ACNC: ABNORMAL % (ref 0–11)
PA ADP BLD-ACNC: ABNORMAL % (ref 0–17)
PHOSPHATE SERPL-MCNC: 1.9 MG/DL (ref 2.5–4.5)
PLATELET # BLD AUTO: 18 K/UL (ref 164–446)
PLATELET # BLD AUTO: 20 K/UL (ref 164–446)
PLATELET # BLD AUTO: 24 K/UL (ref 164–446)
PLATELET # BLD AUTO: 33 K/UL (ref 164–446)
PLATELET # BLD AUTO: 38 K/UL (ref 164–446)
PLATELET # BLD AUTO: 41 K/UL (ref 164–446)
PMV BLD AUTO: 10.6 FL (ref 9–12.9)
PMV BLD AUTO: 11 FL (ref 9–12.9)
PMV BLD AUTO: 11.3 FL (ref 9–12.9)
PMV BLD AUTO: 11.4 FL (ref 9–12.9)
PMV BLD AUTO: 11.5 FL (ref 9–12.9)
POTASSIUM SERPL-SCNC: 2.9 MMOL/L (ref 3.6–5.5)
POTASSIUM SERPL-SCNC: 2.9 MMOL/L (ref 3.6–5.5)
POTASSIUM SERPL-SCNC: 3.2 MMOL/L (ref 3.6–5.5)
POTASSIUM SERPL-SCNC: 3.3 MMOL/L (ref 3.6–5.5)
PRODUCT TYPE UPROD: NORMAL
PROT SERPL-MCNC: 4.3 G/DL (ref 6–8.2)
PROT SERPL-MCNC: 4.5 G/DL (ref 6–8.2)
PROTHROMBIN TIME: 15.8 SEC (ref 12–14.6)
PROTHROMBIN TIME: 16.5 SEC (ref 12–14.6)
PROTHROMBIN TIME: 16.6 SEC (ref 12–14.6)
PROTHROMBIN TIME: 16.9 SEC (ref 12–14.6)
RBC # BLD AUTO: 2.72 M/UL (ref 4.7–6.1)
RBC # BLD AUTO: 2.85 M/UL (ref 4.7–6.1)
RBC # BLD AUTO: 2.88 M/UL (ref 4.7–6.1)
RBC # BLD AUTO: 2.98 M/UL (ref 4.7–6.1)
RBC # BLD AUTO: 3.09 M/UL (ref 4.7–6.1)
RBC # BLD AUTO: 3.54 M/UL (ref 4.7–6.1)
SODIUM SERPL-SCNC: 140 MMOL/L (ref 135–145)
SODIUM SERPL-SCNC: 141 MMOL/L (ref 135–145)
SODIUM SERPL-SCNC: 142 MMOL/L (ref 135–145)
SODIUM SERPL-SCNC: 143 MMOL/L (ref 135–145)
TEG ALGORITHM TGALG: ABNORMAL
TROPONIN T SERPL-MCNC: 153 NG/L (ref 6–19)
TROPONIN T SERPL-MCNC: 166 NG/L (ref 6–19)
TROPONIN T SERPL-MCNC: 188 NG/L (ref 6–19)
UNIT STATUS USTAT: NORMAL
WBC # BLD AUTO: 0.1 K/UL (ref 4.8–10.8)

## 2022-12-30 PROCEDURE — 85576 BLOOD PLATELET AGGREGATION: CPT | Mod: 91

## 2022-12-30 PROCEDURE — 85730 THROMBOPLASTIN TIME PARTIAL: CPT

## 2022-12-30 PROCEDURE — P9034 PLATELETS, PHERESIS: HCPCS

## 2022-12-30 PROCEDURE — 80053 COMPREHEN METABOLIC PANEL: CPT

## 2022-12-30 PROCEDURE — 700111 HCHG RX REV CODE 636 W/ 250 OVERRIDE (IP): Performed by: INTERNAL MEDICINE

## 2022-12-30 PROCEDURE — 82330 ASSAY OF CALCIUM: CPT | Mod: 91

## 2022-12-30 PROCEDURE — 71045 X-RAY EXAM CHEST 1 VIEW: CPT

## 2022-12-30 PROCEDURE — 99232 SBSQ HOSP IP/OBS MODERATE 35: CPT | Performed by: PEDIATRICS

## 2022-12-30 PROCEDURE — 86923 COMPATIBILITY TEST ELECTRIC: CPT

## 2022-12-30 PROCEDURE — 85610 PROTHROMBIN TIME: CPT | Mod: 91

## 2022-12-30 PROCEDURE — 85347 COAGULATION TIME ACTIVATED: CPT

## 2022-12-30 PROCEDURE — 700105 HCHG RX REV CODE 258: Performed by: INTERNAL MEDICINE

## 2022-12-30 PROCEDURE — 700101 HCHG RX REV CODE 250: Performed by: INTERNAL MEDICINE

## 2022-12-30 PROCEDURE — 99292 CRITICAL CARE ADDL 30 MIN: CPT | Mod: GC | Performed by: INTERNAL MEDICINE

## 2022-12-30 PROCEDURE — 87040 BLOOD CULTURE FOR BACTERIA: CPT | Mod: 91

## 2022-12-30 PROCEDURE — 83605 ASSAY OF LACTIC ACID: CPT | Mod: 91

## 2022-12-30 PROCEDURE — 86945 BLOOD PRODUCT/IRRADIATION: CPT

## 2022-12-30 PROCEDURE — 84484 ASSAY OF TROPONIN QUANT: CPT

## 2022-12-30 PROCEDURE — 770022 HCHG ROOM/CARE - ICU (200)

## 2022-12-30 PROCEDURE — C9113 INJ PANTOPRAZOLE SODIUM, VIA: HCPCS | Performed by: INTERNAL MEDICINE

## 2022-12-30 PROCEDURE — 99233 SBSQ HOSP IP/OBS HIGH 50: CPT | Performed by: INTERNAL MEDICINE

## 2022-12-30 PROCEDURE — 80048 BASIC METABOLIC PNL TOTAL CA: CPT

## 2022-12-30 PROCEDURE — 36430 TRANSFUSION BLD/BLD COMPNT: CPT

## 2022-12-30 PROCEDURE — 85384 FIBRINOGEN ACTIVITY: CPT | Mod: 91

## 2022-12-30 PROCEDURE — 85027 COMPLETE CBC AUTOMATED: CPT | Mod: 91

## 2022-12-30 PROCEDURE — P9016 RBC LEUKOCYTES REDUCED: HCPCS

## 2022-12-30 PROCEDURE — 84100 ASSAY OF PHOSPHORUS: CPT

## 2022-12-30 PROCEDURE — 700105 HCHG RX REV CODE 258: Performed by: STUDENT IN AN ORGANIZED HEALTH CARE EDUCATION/TRAINING PROGRAM

## 2022-12-30 PROCEDURE — 85384 FIBRINOGEN ACTIVITY: CPT

## 2022-12-30 PROCEDURE — 82962 GLUCOSE BLOOD TEST: CPT

## 2022-12-30 PROCEDURE — 85025 COMPLETE CBC W/AUTO DIFF WBC: CPT | Mod: 91

## 2022-12-30 PROCEDURE — 83735 ASSAY OF MAGNESIUM: CPT

## 2022-12-30 PROCEDURE — 99291 CRITICAL CARE FIRST HOUR: CPT | Mod: GC | Performed by: INTERNAL MEDICINE

## 2022-12-30 PROCEDURE — 700111 HCHG RX REV CODE 636 W/ 250 OVERRIDE (IP): Performed by: STUDENT IN AN ORGANIZED HEALTH CARE EDUCATION/TRAINING PROGRAM

## 2022-12-30 RX ORDER — POTASSIUM CHLORIDE 29.8 MG/ML
40 INJECTION INTRAVENOUS ONCE
Status: COMPLETED | OUTPATIENT
Start: 2022-12-30 | End: 2022-12-30

## 2022-12-30 RX ORDER — MAGNESIUM SULFATE HEPTAHYDRATE 40 MG/ML
2 INJECTION, SOLUTION INTRAVENOUS ONCE
Status: COMPLETED | OUTPATIENT
Start: 2022-12-30 | End: 2022-12-30

## 2022-12-30 RX ORDER — SODIUM CHLORIDE 9 MG/ML
INJECTION, SOLUTION INTRAVENOUS CONTINUOUS
Status: ACTIVE | OUTPATIENT
Start: 2022-12-30 | End: 2022-12-31

## 2022-12-30 RX ORDER — FUROSEMIDE 10 MG/ML
20 INJECTION INTRAMUSCULAR; INTRAVENOUS
Status: DISCONTINUED | OUTPATIENT
Start: 2022-12-30 | End: 2023-01-01

## 2022-12-30 RX ADMIN — MAGNESIUM SULFATE HEPTAHYDRATE 2 G: 40 INJECTION, SOLUTION INTRAVENOUS at 17:38

## 2022-12-30 RX ADMIN — CEFEPIME 2 G: 2 INJECTION, POWDER, FOR SOLUTION INTRAVENOUS at 05:56

## 2022-12-30 RX ADMIN — INSULIN HUMAN 2 UNITS: 100 INJECTION, SOLUTION PARENTERAL at 23:22

## 2022-12-30 RX ADMIN — POTASSIUM CHLORIDE 40 MEQ: 40 INJECTION INTRAVENOUS at 12:09

## 2022-12-30 RX ADMIN — FUROSEMIDE 20 MG: 10 INJECTION, SOLUTION INTRAMUSCULAR; INTRAVENOUS at 15:01

## 2022-12-30 RX ADMIN — HYDROMORPHONE HYDROCHLORIDE 0.6 MG: 1 INJECTION, SOLUTION INTRAMUSCULAR; INTRAVENOUS; SUBCUTANEOUS at 14:59

## 2022-12-30 RX ADMIN — HYDROCORTISONE SODIUM SUCCINATE 50 MG: 100 INJECTION, POWDER, FOR SOLUTION INTRAMUSCULAR; INTRAVENOUS at 11:17

## 2022-12-30 RX ADMIN — HYDROCORTISONE SODIUM SUCCINATE 50 MG: 100 INJECTION, POWDER, FOR SOLUTION INTRAMUSCULAR; INTRAVENOUS at 23:14

## 2022-12-30 RX ADMIN — CEFEPIME 2 G: 2 INJECTION, POWDER, FOR SOLUTION INTRAVENOUS at 23:14

## 2022-12-30 RX ADMIN — INSULIN HUMAN 1 UNITS: 100 INJECTION, SOLUTION PARENTERAL at 17:27

## 2022-12-30 RX ADMIN — HYDROMORPHONE HYDROCHLORIDE 0.6 MG: 1 INJECTION, SOLUTION INTRAMUSCULAR; INTRAVENOUS; SUBCUTANEOUS at 20:28

## 2022-12-30 RX ADMIN — CEFEPIME 2 G: 2 INJECTION, POWDER, FOR SOLUTION INTRAVENOUS at 13:06

## 2022-12-30 RX ADMIN — HYDROCORTISONE SODIUM SUCCINATE 50 MG: 100 INJECTION, POWDER, FOR SOLUTION INTRAMUSCULAR; INTRAVENOUS at 17:28

## 2022-12-30 RX ADMIN — HYDROMORPHONE HYDROCHLORIDE 0.6 MG: 1 INJECTION, SOLUTION INTRAMUSCULAR; INTRAVENOUS; SUBCUTANEOUS at 07:16

## 2022-12-30 RX ADMIN — FUROSEMIDE 20 MG: 10 INJECTION, SOLUTION INTRAMUSCULAR; INTRAVENOUS at 11:07

## 2022-12-30 RX ADMIN — PANTOPRAZOLE SODIUM 8 MG/HR: 40 INJECTION, POWDER, FOR SOLUTION INTRAVENOUS at 09:05

## 2022-12-30 RX ADMIN — HYDROCORTISONE SODIUM SUCCINATE 50 MG: 100 INJECTION, POWDER, FOR SOLUTION INTRAMUSCULAR; INTRAVENOUS at 05:56

## 2022-12-30 RX ADMIN — POTASSIUM PHOSPHATE, MONOBASIC AND POTASSIUM PHOSPHATE, DIBASIC 30 MMOL: 224; 236 INJECTION, SOLUTION, CONCENTRATE INTRAVENOUS at 18:21

## 2022-12-30 RX ADMIN — POTASSIUM CHLORIDE 40 MEQ: 40 INJECTION INTRAVENOUS at 17:32

## 2022-12-30 RX ADMIN — PANTOPRAZOLE SODIUM 8 MG/HR: 40 INJECTION, POWDER, FOR SOLUTION INTRAVENOUS at 18:31

## 2022-12-30 ASSESSMENT — ENCOUNTER SYMPTOMS
PALPITATIONS: 0
SHORTNESS OF BREATH: 1
CONSTIPATION: 0
DIARRHEA: 0
NAUSEA: 0
FEVER: 0
ABDOMINAL PAIN: 0
BLOOD IN STOOL: 1
DIZZINESS: 0
COUGH: 0
SPUTUM PRODUCTION: 0
SHORTNESS OF BREATH: 0
DIAPHORESIS: 0
WEAKNESS: 1
BLURRED VISION: 0
HEADACHES: 0
VOMITING: 0
CHILLS: 0

## 2022-12-30 ASSESSMENT — PAIN DESCRIPTION - PAIN TYPE
TYPE: ACUTE PAIN;CHRONIC PAIN
TYPE: ACUTE PAIN;CHRONIC PAIN
TYPE: ACUTE PAIN
TYPE: ACUTE PAIN;CHRONIC PAIN
TYPE: ACUTE PAIN
TYPE: ACUTE PAIN;CHRONIC PAIN
TYPE: ACUTE PAIN
TYPE: ACUTE PAIN;CHRONIC PAIN
TYPE: ACUTE PAIN;CHRONIC PAIN
TYPE: ACUTE PAIN
TYPE: ACUTE PAIN;CHRONIC PAIN
TYPE: ACUTE PAIN
TYPE: ACUTE PAIN;CHRONIC PAIN
TYPE: ACUTE PAIN

## 2022-12-30 NOTE — PROGRESS NOTES
Infectious Disease Progress Note    Author: Nova Louis M.D. Date & Time of service: 2022  12:44 PM    Chief Complaint:  Gram negative sepsis  Neutropenia    Interval History:   22 y.o. 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 Found also to be in septic shock:blood cultures Ecoli and Kleb   AF (99.9) WBC 0.1 ANC 0 tolerating abx remains weak and edematous   AF (99.4) WBC 0.1 potassium 2.9 trop 155 platelets 9 sleeping soundly at time of rounds  Labs Reviewed, Medications Reviewed, and Radiology Reviewed.    Review of Systems:  Review of Systems   Unable to perform ROS: Other   Constitutional:  Negative for fever.   Respiratory:  Negative for shortness of breath.    Cardiovascular:  Positive for leg swelling.     Hemodynamics:  Temp (24hrs), Av °C (98.6 °F), Min:36.5 °C (97.7 °F), Max:37.4 °C (99.4 °F)  Temperature: 37.4 °C (99.4 °F)  Pulse  Av.4  Min: 67  Max: 179   Blood Pressure: 134/84       Physical Exam:  Physical Exam  Vitals and nursing note reviewed.   Constitutional:       General: He is not in acute distress.     Appearance: He is ill-appearing and toxic-appearing. He is not diaphoretic.   HENT:      Nose: No rhinorrhea.      Mouth/Throat:      Pharynx: No oropharyngeal exudate.   Eyes:      General: No scleral icterus.     Extraocular Movements: Extraocular movements intact.      Pupils: Pupils are equal, round, and reactive to light.   Cardiovascular:      Rate and Rhythm: Tachycardia present.   Pulmonary:      Effort: Pulmonary effort is normal. No respiratory distress.      Breath sounds: No stridor.   Abdominal:      General: There is no distension.      Palpations: Abdomen is soft.      Tenderness: There is no abdominal tenderness.   Musculoskeletal:         General: Swelling present.      Cervical back: Neck supple. No rigidity.   Skin:     Coloration: Skin is not jaundiced.      Findings: Bruising present.   Neurological:       General: No focal deficit present.      Mental Status: He is alert and oriented to person, place, and time.   Psychiatric:         Mood and Affect: Mood normal.         Behavior: Behavior normal.       Meds:    Current Facility-Administered Medications:     furosemide    K+ Scale: Goal of 5    potassium chloride in water    NS    hydrocortisone sodium succinate PF    pantoprazole (PROTONIX) infusion    NORepinephrine    vasopressin (PITRESSIN) infusion    cefepime    ondansetron    HYDROmorphone **OR** HYDROmorphone    insulin regular **AND** POC blood glucose manual result **AND** NOTIFY MD and PharmD **AND** Administer 20 grams of glucose (approximately 8 ounces of fruit juice) every 15 minutes PRN FSBG less than 70 mg/dL **AND** dextrose bolus    Labs:  Recent Labs     12/27/22  1346 12/27/22  1448 12/27/22  2042 12/28/22  0240 12/28/22  0900 12/28/22  1500 12/30/22  0510 12/30/22  0835 12/30/22  1111   WBC 0.3*   < > 0.2*   < > 0.2*   < > 0.1* 0.1* 0.1*   RBC 1.85*   < > 2.78*   < > 2.81*   < > 2.98* 2.85* 2.72*   HEMOGLOBIN 6.3*   < > 8.6*   < > 8.6*   < > 8.6* 8.3* 7.9*   HEMATOCRIT 19.7*   < > 25.5*   < > 24.5*   < > 24.6* 23.5* 22.5*   .5*   < > 91.7   < > 87.2   < > 82.6 82.5 82.7   MCH 34.1*   < > 30.9   < > 30.6   < > 28.9 29.1 29.0   RDW 63.3*   < > 53.0*   < > 55.8*   < > 48.6 48.4 49.1   PLATELETCT 12*   < > 69*   < > 37*   < > 33* 18* 38*   MPV  --   --  11.2   < > 11.6   < > 11.4 11.0 11.5   NEUTSPOLYS 0.00*  --  0.00*  --  2.60*  --   --   --   --    LYMPHOCYTES 96.10*  --  90.90*  --  97.40*  --   --   --   --    MONOCYTES 3.90  --  0.00  --  0.00  --   --   --   --    EOSINOPHILS 0.00  --  9.10*  --  0.00  --   --   --   --    BASOPHILS 0.00  --  0.00  --  0.00  --   --   --   --    RBCMORPHOLO Present  --  Present  --  Present  --   --   --   --     < > = values in this interval not displayed.       Recent Labs     12/30/22  0508 12/30/22  0510 12/30/22  1111   SODIUM 143 141 143    POTASSIUM 3.3* 3.2* 2.9*   CHLORIDE 107 105 107   CO2 30 28 31   GLUCOSE 168* 162* 131*   BUN 15 16 14       Recent Labs     12/29/22  0240 12/29/22  2130 12/30/22  0245 12/30/22  0508 12/30/22  0510 12/30/22  1111   ALBUMIN 1.9*  --  2.2* 2.0*  --   --    TBILIRUBIN 1.7*  --  2.5* 2.2*  --   --    ALKPHOSPHAT 62  --  145* 147*  --   --    TOTPROTEIN 3.7*  --  4.5* 4.3*  --   --    ALTSGPT 184*  --  172* 164*  --   --    ASTSGOT 144*  --  87* 77*  --   --    CREATININE 0.47*   < > 0.40* 0.39* 0.30* 0.30*    < > = values in this interval not displayed.         Imaging:  CT-ABDOMEN-PELVIS WITH    Result Date: 12/27/2022 12/27/2022 1:47 PM HISTORY/REASON FOR EXAM:  Sepsis. Nausea and vomiting. Abdominal pain chemotherapy for cancer. TECHNIQUE/EXAM DESCRIPTION:   CT scan of the abdomen and pelvis with contrast. Contrast-enhanced helical scanning was obtained from the diaphragmatic domes through the pubic symphysis following the bolus administration of nonionic contrast without complication. 100 mL of Omnipaque 350 nonionic contrast was administered without complication. Low dose optimization technique was utilized for this CT exam including automated exposure control and adjustment of the mA and/or kV according to patient size. COMPARISON: No prior studies available. FINDINGS: Lower Chest: Unremarkable. Liver: Hepatic fatty infiltration. Spleen: Unremarkable. Pancreas: Unremarkable. Gallbladder: No calcified stones. Biliary: Nondilated. Adrenal glands: Normal. Kidneys: Unremarkable without hydronephrosis. Bowel: No obstruction or acute inflammation. Normal appendix. Lymph nodes: No adenopathy. Vasculature: Unremarkable. Peritoneum: Unremarkable without ascites. Musculoskeletal: No acute or destructive process. Pelvis: No adenopathy or free fluid.     1.  Hepatic steatosis. 2.  No acute inflammatory abnormality within the abdomen or pelvis.    CT-HEAD W/O    Result Date: 12/27/2022 12/27/2022 1:42 PM HISTORY/REASON  FOR EXAM:  Mental status change, unknown cause. Chemotherapy TECHNIQUE/EXAM DESCRIPTION AND NUMBER OF VIEWS: CT of the head without contrast. The study was performed on a helical multidetector CT scanner. Contiguous axial sections were obtained from the skull base through the vertex. Up to date radiation dose reduction adjustments have been utilized to meet ALARA standards for radiation dose reduction. COMPARISON:  None available FINDINGS: The calvariae and skull base are unremarkable. There are no extraaxial fluid collections. The ventricular system and basal cisterns are within normal limits. There are no areas of abnormal density in the brain substance. There are no hemorrhagic lesions.  There are no mass effects or shift of midline structures. The brainstem and posterior fossa structures are unremarkable. Paranasal sinuses in the field of view are unremarkable. Mastoids in the field of view are unremarkable.     Head CT without contrast within normal limits. No evidence of acute cerebral infarction, hemorrhage or mass lesion.     CT-MAXILLOFACIAL W/O PLUS RECONS    Result Date: 12/27/2022 12/27/2022 1:42 PM HISTORY/REASON FOR EXAM:  Maxillofacial pain. Blood in the mouth. TECHNIQUE/EXAMD DESCRIPTION AND NUMBER OF VIEWS: CT scan maxillofacial without contrast, with reconstructions. Thin-section helical imaging was obtained of the maxillofacial structures from the orbital roofs through the mandible. Coronal and sagittal multiplanar volume reformat images were generated from the axial data. Low dose optimization technique was utilized for this CT exam including automated exposure control and adjustment of the mA and/or kV according to patient size. COMPARISON: None. FINDINGS: The maxillofacial and orbital bony structures are unremarkable. The paranasal sinuses are normally developed. There is a small retention cyst at the alveolar recess of the right maxillary sinus. There are no air-fluid levels. The mastoids and  middle ear cavities are clear. There is no significant cervical adenopathy in the field-of-view.     1.  Essentially negative maxillofacial sinus CT. Small retention cyst in the alveolar recess of the right maxillary sinus of no clinical significance.    DX-CHEST-PORTABLE (1 VIEW)    Result Date: 12/29/2022 12/29/2022 1:08 AM HISTORY/REASON FOR EXAM:  Shortness of Breath. TECHNIQUE/EXAM DESCRIPTION AND NUMBER OF VIEWS: Single portable view of the chest. COMPARISON: 12/28/2022 1:53 AM FINDINGS: Cardiomediastinal contour is unchanged. Previously described pulmonary parenchymal opacities persist. No pneumothorax identified. Orogastric tube has been removed. RIGHT chest infusion port remains.     1.  Supportive tubing as described above. 2.  No other significant change from prior exam.     DX-CHEST-PORTABLE (1 VIEW)    Result Date: 12/28/2022 12/28/2022 2:11 AM HISTORY/REASON FOR EXAM:  Shortness of Breath. TECHNIQUE/EXAM DESCRIPTION AND NUMBER OF VIEWS: Single portable view of the chest. COMPARISON: 12/27/2022 FINDINGS: Cardiomediastinal contour is unchanged. Previously described pulmonary parenchymal opacities persist. No pneumothorax identified. Supportive tubing is unchanged.     No significant change from prior exam.    DX-CHEST-PORTABLE (1 VIEW)    Result Date: 12/28/2022 12/27/2022 11:46 PM HISTORY/REASON FOR EXAM:  Shortness of Breath. TECHNIQUE/EXAM DESCRIPTION AND NUMBER OF VIEWS: Single portable view of the chest. COMPARISON: 12/27/2022 FINDINGS: Cardiac mediastinal contour is unchanged. Lungs show hypoinflation. Ill-defined bilateral perihilar opacities, new from prior exam. Pulmonary vessels are prominent. No pleural fluid collection or pneumothorax. Orogastric tube tip at the proximal stomach. RIGHT chest infusion port present.     Worsening hypoinflation and bilateral perihilar infiltrates as well as prominence of the pulmonary vasculature suggesting pulmonary edema.  Superimposed pneumonia is not  excluded.    DX-CHEST-PORTABLE (1 VIEW)    Result Date: 2022 2:09 PM HISTORY/REASON FOR EXAM: Chest pain TECHNIQUE/EXAM DESCRIPTION AND NUMBER OF VIEWS: Single portable view of the chest. COMPARISON: 2022 FINDINGS: There is a right subclavian Port-A-Cath. There is no evidence of focal consolidation or evidence of pulmonary edema. There is no pleural effusion. The heart is normal in size.     No evidence of acute cardiopulmonary process.    US-EXTREMITY VENOUS LOWER BILAT    Result Date: 2022   Vascular Laboratory  CONCLUSIONS  No deep venous thrombosis.  Distal subcutaneous fat edema.  REBECA CHEEMA  Exam Date:     2022 17:13  Room #:     Inpatient  Priority:     Stat  Ht (in):             Wt (lb):  Ordering Physician:        SANDI IGLESIAS  Referring Physician:       [id number]  Sonographer:               Palma Churchill RVT  Study Type:                Complete Bilateral  Technical Quality:         Adequate  Age:    22    Gender:     M  MRN:    [id number]  :    2000      BSA:  Indications:     Localized swelling, mass and lump, lower limb, bilateral  CPT Codes:       74566  ICD Codes:       R22.43  History:         Swelling/edema of legs, concern for pulmonary embolus. Pain.                     No prior duplex.  Limitations:  PROCEDURES:  Bilateral lower extremity venous duplex imaging.  The following venous structures were evaluated: common femoral, deep  femoral, proximal great saphenous, femoral, popliteal, peroneal, and  posterior tibial veins.  Serial compression, color, and spectral Doppler flow evaluations were  performed.  FINDINGS:  Bilateral lower extremities.  No deep venous thrombosis.  All veins demonstrate complete color filling and compressibility with  normal venous flow dynamics including spontaneous flow and respiratory  phasicity.  Interstitial fluid consistent with edema is observed below the knee.  Dorian Knowles  (Electronically Signed)   Final Date:      2022                   20:46    EC-ECHOCARDIOGRAM LTD W/O CONT    Result Date: 2022  Transthoracic Echo Report Echocardiography Laboratory CONCLUSIONS The left ventricular ejection fraction is visually estimated to be 65%. Normal regional wall motion. REBECA CHEEMA Exam Date:         2022                    17:50 Exam Location:     Inpatient Priority:          Routine Ordering Physician:        SANDI IGLESIAS Referring Physician: Sonographer:               Tigist INTERIANO Age:    22     Gender:    M MRN:    [id number] :    2000 BSA:    1.82   Ht (in):    65     Wt (lb):    165 Exam Type:     Limited Indications:     Shortness of breath ICD Codes:       786.05 CPT Codes:       90311 BP:   128    /   57     HR:   160 Technical Quality:       Technically difficult study                          incomplete information is                          obtained MEASUREMENTS  (Male / Female) Normal Values 2D ECHO Estimated LV Ejection Fraction    65 %                  * Indicates values subject to auto-interpretation LV EF:  65    % FINDINGS Left Ventricle Normal left ventricular chamber size. Normal left ventricular systolic function. The left ventricular ejection fraction is visually estimated to be 65%. Normal regional wall motion. Diastolic function is difficult to assess with arrhythmia. Right Ventricle The right ventricle is not well visualized. Right Atrium The right atrium is not well visualized. Left Atrium The left atrium is not well visualized. Mitral Valve The mitral valve is not well visualized. Aortic Valve The aortic valve is not well visualized. Tricuspid Valve The tricuspid valve is not well visualized. Pulmonic Valve The pulmonic valve is not well visualized. Pericardium Normal pericardium without effusion. Aorta The ascending aorta is not well visualized. Mayito Howell MD (Electronically Signed) Final Date:     2022               "   18:38      Micro:  Results       Procedure Component Value Units Date/Time    BLOOD CULTURE [[id number]] Collected: 12/30/22 1045    Order Status: Sent Specimen: Blood from Peripheral Updated: 12/30/22 1147    Narrative:      Per Hospital Policy: Only change Specimen Src: to \"Line\" if  specified by physician order.    BLOOD CULTURE [[id number]] Collected: 12/30/22 1015    Order Status: Sent Specimen: Blood from Peripheral Updated: 12/30/22 1051    Narrative:      Per Hospital Policy: Only change Specimen Src: to \"Line\" if  specified by physician order.    BLOOD CULTURE [[id number]]  (Abnormal) Collected: 12/27/22 1410    Order Status: Completed Specimen: Blood from Peripheral Updated: 12/29/22 0806     Significant Indicator POS     Source BLD     Site PERIPHERAL     Culture Result Growth detected by Bactec instrument. 12/27/2022  21:19      Escherichia coli  See previous culture for sensitivity report.        Klebsiella pneumoniae  See previous culture for sensitivity report.      Narrative:      CALL  Jenkins  161 tel. [phone],  CALLED  161 tel. [phone] 12/27/2022, 21:25, RB PERF. RESULTS CALLED TO: RN  [phone]  Per Hospital Policy: Only change Specimen Src: to \"Line\" if  specified by physician order.  Left AC    BLOOD CULTURE [[id number]]  (Abnormal)  (Susceptibility) Collected: 12/27/22 1345    Order Status: Completed Specimen: Blood from Peripheral Updated: 12/29/22 0806     Significant Indicator POS     Source BLD     Site PERIPHERAL     Culture Result Growth detected by Bactec instrument. 12/27/2022  21:20      Escherichia coli      Klebsiella pneumoniae    Narrative:      CALL  Jenkins  161 tel. [phone],  CALLED  161 tel. [phone] 12/27/2022, 21:24, RB PERF. RESULTS CALLED TO: RN  [phone]  Per Hospital Policy: Only change Specimen Src: to \"Line\" if  specified by physician order.  No site indicated    Susceptibility       Escherichia coli (1)       Antibiotic Interpretation Microscan   Method Status    " Ampicillin Sensitive <=8 mcg/mL COLTEN Final    Ceftriaxone Sensitive <=1 mcg/mL COLTEN Final    Cefazolin Sensitive <=2 mcg/mL COLTEN Final    Ciprofloxacin Sensitive <=0.25 mcg/mL COLTEN Final    Cefepime Sensitive <=2 mcg/mL COLTEN Final    Cefuroxime Sensitive <=4 mcg/mL COLTEN Final    Ampicillin/sulbactam Sensitive <=4/2 mcg/mL COLTEN Final    Ertapenem Sensitive <=0.5 mcg/mL COLTEN Final    Tobramycin Intermediate 8 mcg/mL COLTEN Final    Gentamicin Sensitive <=2 mcg/mL COLTEN Final    Minocycline Sensitive <=4 mcg/mL COLTEN Final    Moxifloxacin Sensitive <=2 mcg/mL COLTEN Final    Pip/Tazobactam Sensitive <=8 mcg/mL COLTEN Final    Trimeth/Sulfa Resistant >2/38 mcg/mL COLTEN Final    Tigecycline Sensitive <=2 mcg/mL COLTEN Final              Klebsiella pneumoniae (2)       Antibiotic Interpretation Microscan   Method Status    Ceftriaxone Sensitive <=1 mcg/mL COLTEN Final    Cefazolin Sensitive <=2 mcg/mL COLTEN Final    Ciprofloxacin Sensitive <=0.25 mcg/mL COLTEN Final    Cefepime Sensitive <=2 mcg/mL COLTEN Final    Cefuroxime Sensitive <=4 mcg/mL COLTEN Final    Ampicillin/sulbactam Sensitive <=4/2 mcg/mL COLTEN Final    Ertapenem Sensitive <=0.5 mcg/mL COLTEN Final    Tobramycin Intermediate 8 mcg/mL COLTEN Final    Gentamicin Sensitive <=2 mcg/mL COLTEN Final    Minocycline Intermediate 8 mcg/mL COLTEN Final    Moxifloxacin Sensitive <=2 mcg/mL COLTEN Final    Pip/Tazobactam Sensitive <=8 mcg/mL COLTEN Final    Trimeth/Sulfa Resistant >2/38 mcg/mL COLTEN Final    Tigecycline Sensitive <=2 mcg/mL COLTEN Final                       URINE CULTURE(NEW) [[id number]] Collected: 12/27/22 1512    Order Status: Completed Specimen: Urine Updated: 12/29/22 0730     Significant Indicator NEG     Source UR     Site -     Culture Result No growth at 48 hours.    Narrative:      Indication for culture:->Emergency Room Patient  Indication for culture:->Emergency Room Patient    S. Aureus By PCR, Nasal Complete [[id number]] Collected: 12/27/22 2105    Order Status: Completed Specimen: Respirate from  Respiratory Updated: 12/28/22 0109     Staph aureus by PCR Negative     MRSA by PCR Negative    Narrative:      Collected By: [id number] SHEYZACH DELATORRE    CoV-2, FLU A/B, and RSV by PCR (2-4 Hours CEPHEID) : Collect NP swab in VTM [[id number]] Collected: 12/27/22 1500    Order Status: Completed Specimen: Respirate Updated: 12/27/22 1639     Influenza virus A RNA Negative     Influenza virus B, PCR Negative     RSV, PCR Negative     SARS-CoV-2 by PCR NotDetected     Comment: [facility] providers: PLEASE REFER TO DE-ESCALATION AND RETESTING PROTOCOL  on [email or web address]    **The [facility] GeneXpert Xpress SARS-CoV-2 RT-PCR Test has been made  available for use under the Emergency Use Authorization (EUA) only.          SARS-CoV-2 Source NP Swab    URINALYSIS [[id number]]  (Abnormal) Collected: 12/27/22 1512    Order Status: Completed Specimen: Urine Updated: 12/27/22 1544     Color Yellow     Character Clear     Specific Gravity >=1.045     Ph 5.0     Glucose Negative mg/dL      Ketones Negative mg/dL      Protein Negative mg/dL      Bilirubin Negative     Urobilinogen, Urine 0.2     Nitrite Negative     Leukocyte Esterase Negative     Occult Blood Trace     Micro Urine Req Microscopic    Narrative:      Indication for culture:->Emergency Room Patient            Assessment:  Active Hospital Problems    Diagnosis     *Shock (HCC) [R57.9]     Gram-negative bacteremia [R78.81]     DIC (disseminated intravascular coagulation) (HCC) [D65]     At high risk for venous thromboembolism (VTE) [Z91.89]     Acute kidney injury (HCC) [N17.9]     High anion gap metabolic acidosis [E87.29]     Lactic acid acidosis [E87.20]     Hyperglycemia [R73.9]     Neutropenic sepsis (HCC) [A41.9, D70.9]     Upper GI bleed [K92.2]     Hyperkalemia [E87.5]     Hypocalcemia [E83.51]     Elevated troponin [R77.8]     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   Do not change to ceftriaxone  Repeat blood cultures ordered  Transfusions and electrolyte replacement as needed  Will need explantation port once stabilized as likely infected  GCSF per oncology  Plan to resume Bactrim for PJP prophylaxis Mon  High risk for additional bleeding  Holding chemo as long as feasible  Final antibiotic recommendations per culture results, resolution neutropenia, and clinical course     Pharmacy

## 2022-12-30 NOTE — PROGRESS NOTES
Pediatric Hematology/Oncology  Daily Progress Note      Patient Name:  Tomas Jean-Batpiste  : 2000  MRN: [id number]    Location of Service:  [facility]  Date of Service: 2022  Time: 9:36 PM    Hospital Day: 3    Protocol / Treatment Plan: [place] Chromosome Precursor B-Cell Acute Lymphoblastic Leukemia, ON STUDY [id number], Delayed Intensification, Day 24    SUBJECTIVE:     Low grade temperature with Tmax of 99 F at 1030 today. Still c/o back pain and leg pain which improves after taking dilaudid. Ongoing bloody stools. Platelet threshold increased to 50,000/microliters and resumed protonix drip.  Still requiring pRBCs and platelet transfusion to maintain above threshold. Towards the afternoon, patient became more tachypneic and received lasix with brisk diuresis. Off of vasopressors and maintaining adequate BP. Tolerated sips of water and advancing diet slowly. Able to drink smoothie today. Blotchy erythematous rash on the face and trunk, non bothersome. Continues on cefepime. On sliding scale insulin for elevated serum glucose.    Review of Systems:     Constitutional: Critically ill but improving slowly  HENT: Negative for auditory changes or ear pain, no congestion and rhinorrhea, no sore throat.  No mouth sores. Crusted blood on the upper lip.  Eyes: Negative for visual changes.  Respiratory: Oxygen supplement via NC  Cardiovascular: Positive for leg swelling after fluid resuscitation  Gastrointestinal: Negative for nausea, vomiting, abdominal pain, diarrhea, constipation. Blood in stool. NG removed  Genitourinary: Negative for dysuria.  Musculoskeletal: Positive for back pain. Leg pain improving    Skin: Positive for blotchy, erythematous rash  Neurological: Alert and oriented  Endo/Heme/Allergies: Bleeds easily   Psychiatric/Behavioral: Anxious    OBJECTIVE:     Max Temp: Temp (24hrs), Av.4 °C (97.6 °F), Min:35.8 °C (96.5 °F), Max:37.7 °C (99.9 °F)      Vitals: BP (!) 144/85   " Pulse (!) 124   Temp 36.8 °C (98.2 °F) (Temporal)   Resp (!) 28   Ht 1.664 m (5' 5.5\")   Wt 72.1 kg (158 lb 15.2 oz)   SpO2 92%   BMI 26.05 kg/m²     I/O:   Intake/Output Summary (Last 24 hours) at 12/29/2022 2310  Last data filed at 12/29/2022 2300  Gross per 24 hour   Intake 2545.56 ml   Output 5950 ml   Net -3404.44 ml         Labs:   Latest Reference Range & Units 12/29/22 02:40 12/29/22 05:11 12/29/22 05:15   WBC 4.8 - 10.8 K/uL   0.1 (LL)   RBC 4.70 - 6.10 M/uL   2.72 (L)   Hemoglobin 14.0 - 18.0 g/dL   8.1 (L)   Hematocrit 42.0 - 52.0 %   22.8 (L)   MCV 81.4 - 97.8 fL   83.8   MCH 27.0 - 33.0 pg   29.8   MCHC 33.7 - 35.3 g/dL   35.5 (H)   RDW 35.9 - 50.0 fL   48.8   Platelet Count 164 - 446 K/uL   40 (L)   MPV 9.0 - 12.9 fL   10.6   Nucleated RBC /100 WBC   62.50   NRBC (Absolute) K/uL   0.05   Sodium 135 - 145 mmol/L 137     Potassium 3.6 - 5.5 mmol/L 3.9     Chloride 96 - 112 mmol/L 106     Co2 20 - 33 mmol/L 26     Anion Gap 7.0 - 16.0  5.0 (L)     Glucose 65 - 99 mg/dL 154 (H)     Bun 8 - 22 mg/dL 29 (H)     Creatinine 0.50 - 1.40 mg/dL 0.47 (L)     GFR (CKD-EPI) >60 mL/min/1.73 m 2 151     Calcium 8.5 - 10.5 mg/dL 7.4 (L)     Correct Calcium 8.5 - 10.5 mg/dL 9.1     AST(SGOT) 12 - 45 U/L 144 (H)     ALT(SGPT) 2 - 50 U/L 184 (H)     Alkaline Phosphatase 30 - 99 U/L 62     Total Bilirubin 0.1 - 1.5 mg/dL 1.7 (H)     Albumin 3.2 - 4.9 g/dL 1.9 (L)     Total Protein 6.0 - 8.2 g/dL 3.7 (L)     Globulin 1.9 - 3.5 g/dL 1.8 (L)     A-G Ratio g/dL 1.1     Ionized Calcium 1.1 - 1.3 mmol/L 1.1     Troponin T 6 - 19 ng/L 286 (H)     POC Glucose, Blood 65 - 99 mg/dL  150 (H)    INR 0.87 - 1.13  1.60 (H)  1.56 (H)   PT 12.0 - 14.6 sec 18.7 (H)  18.3 (H)   APTT 24.7 - 36.0 sec 39.8 (H)  39.5 (H)   Fibrinogen 215 - 460 mg/dL 398  451       Component 3 d ago    Significant Indicator POS Positive (POS)    Source BLD    Site PERIPHERAL    Culture Result Growth detected by Bactec instrument. 12/27/2022  21:20 " Abnormal     Culture Result Escherichia coli Abnormal     Culture Result Klebsiella pneumoniae Abnormal         Susceptibility     Escherichia coli Klebsiella pneumoniae     COLTEN COLTEN     Ampicillin <=8 mcg/mL Sensitive       Ampicillin/sulbactam <=4/2 mcg/mL Sensitive <=4/2 mcg/mL Sensitive     Cefazolin <=2 mcg/mL Sensitive <=2 mcg/mL Sensitive     Cefepime <=2 mcg/mL Sensitive <=2 mcg/mL Sensitive     Ceftriaxone <=1 mcg/mL Sensitive <=1 mcg/mL Sensitive     Cefuroxime <=4 mcg/mL Sensitive <=4 mcg/mL Sensitive     Ciprofloxacin <=0.25 mcg/mL Sensitive <=0.25 mcg/mL Sensitive     Ertapenem <=0.5 mcg/mL Sensitive <=0.5 mcg/mL Sensitive     Gentamicin <=2 mcg/mL Sensitive <=2 mcg/mL Sensitive     Minocycline <=4 mcg/mL Sensitive 8 mcg/mL Intermediate     Moxifloxacin <=2 mcg/mL Sensitive <=2 mcg/mL Sensitive     Pip/Tazobactam <=8 mcg/mL Sensitive <=8 mcg/mL Sensitive     Tigecycline <=2 mcg/mL Sensitive <=2 mcg/mL Sensitive     Tobramycin 8 mcg/mL Intermediate 8 mcg/mL Intermediate     Trimeth/Sulfa >2/38 mcg/mL Resistant >2/38 mcg/mL Resistant      Physical Exam:    Constitutional: Much improved, pallor improved. Edematous  HENT: Normocephalic and atraumatic. Alopecia.  No rhinorrhea. Dried lips, some crusted blood inside of the mouth noted  Eyes: Conjunctivae are normal. EOMI.   Neck: Normal range of motion of neck, no adenopathy.    Cardiovascular: HR much improved, regular rhythm.  2/6 systolic murmur. DP/radial pulses 2+, cap refill < 2 sec  Pulmonary/Chest: Effort normall. No respiratory distress. Symmetric expansion.  No crackles or wheezes.  Abdomen: Soft. Bowel sounds are normal. No distension and no mass. There is no hepatosplenomegaly.    Genitourinary:  Deferred  Musculoskeletal: Not assessed  Neurological: Alert and oriented to person and place.   Skin: Blotchy, erythematous rash on the face and trunk  Psychiatric : Anxious    ASSESSMENT AND PLAN:     Tomas Jean-Baptiste is a 22 y.o. male with  [place] Chromosome Precursor B-Cell Acute Lymphoblastic Leukemia who is receiving therapy per protocol [id number], on study presents to the [facility] Emergency Department with gram negative septic shock and DIC. Culture now growing (within 7 hrs of drawing blood culture) E.Coli and Klebsiella. Remains on Cefepime. Critically ill requiring intensive care. Clinically improving after agressive management.    E.coli and Klebsiella sepsis/septic shock in an immunocompromised host:  - Blood culture from 12/27/2022: E.coli and Klebsiella  - Susceptible to cefepime  - Wean pressors per CICU: currently off of pressors  - Continue stress dose hydrocortisone (changed to hydrocortisone 50 mg every 6 hrs)  - ID consulted for further input, they recommend removal of port when stable  - Lactic acidosis improving after aggressive resuscitation      Ph+ Precursor B-Cell Acute Lymphoblastic Leukemia, in MRD Remission: Receiving therapy   TAT [id number], AR Arm B, Delayed Intensification, Day 24  - HOLD imatinib given patient critically ill  - Not due for any chemotherapy until Day 29 which will be delayed due to current tenuous clinical condition  - If no improvement in clinical status, will consider starting G-CSF vs granulocyte infusion  - HOLD apixaban that was started on Day 8 to prevent pegaspargase induced clots.  - HOLD Bactrim (for PJP prophylaxis) this weekend given patient severely myelosuppressed. Restart next weekend or when ready.     Therapy related pancytopenia:  - Transfuse to maintain hemoglobin >8 gm/dL or with symptoms/volume replacement for hypotension  - Transfuse to maintain platelets >50,000/microliters (threshold increased further given ongoing hematochezia) or with symptoms/volume replacement for hypotension  - Use irradiated/leukoreduced products  - CBC daily      DIC secondary to septic shock  - S/p FFP and cryoprecipitate   - Fibrinogen level WNL, PTT/PT still prolonged  - Continue to  monitor DIC panel and replace products accordingly  - On IV protonix due to bloody emesis/stool    Blotchy erythematous rash:  - Likely multifactorial: fever, transfusion related, engraftment?  - Currently, non bothersome. Will continue to monitor     Rest of the management per ICU team. Discussed with ICU attending, patient's mother and Dr. Faye. Patient remains critically ill.         Alyce Duenas MD  Pediatric Hematology / Oncology  [facility]  Cell.  [phone]  [facility]. [phone]

## 2022-12-30 NOTE — PROGRESS NOTES
"I have seen and examined the patient today.  I have reviewed the medical record, laboratory data, imaging and all relevant studies.  I have discussed the plan of care with the Internal Medicine Resident and agree with the note and plan as documented with the following additions and clarifications:     HPI:  22 y.o. M, ALL, active chemo, pancytopenic, admitted 12/27 with shock, GNR bacteremia, hematemesis/hematochezia      Hospital Course:  \"22-year-old male with history of ALL on imatinib since June 2022 last chemotherapy 12/20/2022, was placed on Decadron 9 mg twice daily and famotidine after this.  Patient had a history of DVT in the past and was started on apixaban for risk of thrombotic event 2 weeks ago. Patient has had 1 week of vomiting lightheadedness palpitations and chest pain with syncopal episode today, that led to minor trauma and significant bleeding. Also noted that his vomit had been dark blackish/coffee-ground for the last couple days. In ER patient was found to be in significant hypovolemic/hemorrhagic/septic shock.  With profound pancytopenia, and severe DIC, later developed significant hematemesis requiring NG tube.\" ~ Dr. Apodaca 12/27     Large bloody emesis, hemoglobin 4.4, lactate 18; received MTP 4 PRBC, 2 FFP, 2 platelets, 2 cryo; Protonix drip, stress dose hydrocortisone, titrated vasopressors, empiric broad-spectrum antibiotics with cefepime/vancomycin in the setting of pancytopenia/neutropenia     12/28 -for other hematemesis, NG tube out, ongoing hematochezia, back on Protonix drip, transfuse to hemoglobin>8, platelets>30, follow fibrinogen, TEG, factor replacement; lactose fermenting gram-negative rods BC x2, continue cefepime, DC vancomycin, discussed with ID  12/29 -clinically improving but ongoing bloody BMs.  Continue to correct coagulopathy; hemoglobin>8, platelets>50, Protonix infusion, empiric antibiotics, reviewed with ID  12/30 - GIB improving; off pressors, Transfuse Plts >30, " Hgb >8     Interval Events:    T-max 99  Off vasopressors  I/O = 2200/7600  8 LPM oxygen  CXR: Worsening bilateral infiltrates/edema  Plts  PRBCs  A/o x 4  SR/ST, desats  Poor PO, good PO intake  Dark/black stools, improving  Ppi gtt  Some blotchy, rash  Cefepime day 4  Lasix, K-scale       A/P:  Severe Sepsis with Septic Shock (Present on Admission)  -BCx2 GNR 12/27  -Shock state multifactorial including severe sepsis with shock, hypovolemia hemorrhagic, ALVARADO on Decadron  -Clinically improving, titrating down vasopressor agents after aggressive resuscitation and broad-spectrum antibiotic therapy initiation  -Continue stress dose corticosteroid  -Follow-up cultures with de-escalation of vancomycin given GNR's in blood  -Discussed with ID, will need tunneled port removed after resuscitation next 1-2 days     ALL  -chemotherapy on 12/20/2022 (incristine, doxorubicin, Dexrazoxane, PEG-aspargase)  -imatinib  -Pancytopenia, ANC 0  -Dr Duenas (Pediatriac Onc) following and input greatly appreciated     GI bleed  -Suspect gastritis but differential broad  -Remove NG tube if no further hematemesis  -Continue PPI  -Transfuse to hemoglobin >8 , Plts > 30 with active GIB  -Correct coagulopathy, follow-up coags/TEG  -Discussed with GI, no indication for endoscopy at this time, expect resolution of GI bleeding with correction of coagulopathy     DIC  -Fibrinogen improved  -Follow-up coags as above     Additional assessment/plan as below  The patient remains critically ill.  Critical care time = 75 minutes in directly providing and coordinating critical care and extensive data review.  No time overlap and excludes procedures.                [facility] Progress Note      Admit Date: 12/27/2022    Resident(s): Adonay Anne M.D.   Attending:  EDMUND BONILLA/ Dr. Bernal    Patient ID:    Name:  Tomas Jean-Baptiste   YOB: 2000  Age:  22 y.o.  male   MRN:  [id number]    Hospital Course (carried forward and  updated):  Tomas Jean-Baptiste is a 22 y.o. male with has a history of B cell acute lymphoblastic leukemia.  He has been on imatinib therapy since June 2022, and last received chemotherapy on 12/20/2022, which included vincristine, doxorubicin, Dexrazoxane, and PEG-aspargase.  He was then placed on a 7-day course of dexamethasone 9 mg oral twice daily, and did take famotidine for GI prophylaxis with this.  He also continued taking his imatinib.  And due to being at risk for thrombotic events from the PEG-aspargase, he was restarted 2 weeks before admission on apixaban 5 mg p.o. twice daily which she was taking at home up until presentation.        Presentation is most consistent with DIC. Hematology/oncology consulted. Patient did vomit a liter of blood from his stomach. Gastroenterology was notified, and opinion is that this is likely hemorrhagic gastritis from DIC and that there is no benefit in endoscopic intervention. Patient received 4 unit prbc, 2 unit platelet 2 unit cryo, his laboratories considerably improved after resus. Patient admitted to ICU for further management.     12/28 Patient NG tube removed, tolerating diet so far, advancing slowly. Laboratories considerably improving after overnight colloid resus. Clinically appears improving as well. Vancomycin discontinued, ID consulted for gram negative bacteremia. PPI drip changed to BID IV.    12/29 Continued to receive multiple transfusions. PPI gtt restarted. Continued to have maroon stools.    Consultants:  Critical Care  Hematology/Oncology  Infectious Disease      Interval Events:  12/30 Continues to have bowel movements that are becoming darker. Received 1u PLT this AM and 1u pRBC on repeat labs. Still on 8L NC, UOP 7.6L on diuresis. Bcx with E coli and Klebsiella sensitive to Cefepime. Repeat Bcx ordered.     Vitals Range last 24h:  Temp:  [36.5 °C (97.7 °F)-37.4 °C (99.4 °F)] 37.4 °C (99.4 °F)  Pulse:  [] 142  Resp:  [10-32] 25  BP:  ()/(51-97) 143/84  SpO2:  [84 %-100 %] 94 %      Intake/Output Summary (Last 24 hours) at 12/30/2022 0823  Last data filed at 12/30/2022 0805  Gross per 24 hour   Intake 2541.01 ml   Output 7600 ml   Net -5058.99 ml        Review of Systems   Constitutional:  Positive for malaise/fatigue. Negative for chills, diaphoresis and fever.   Eyes:  Negative for blurred vision.   Respiratory:  Positive for shortness of breath. Negative for cough and sputum production.    Cardiovascular:  Negative for chest pain, palpitations and leg swelling.   Gastrointestinal:  Positive for blood in stool. Negative for abdominal pain, constipation, diarrhea, nausea and vomiting.   Genitourinary:  Negative for dysuria and hematuria.   Neurological:  Positive for weakness. Negative for dizziness and headaches.     PHYSICAL EXAM:  Vitals:    12/30/22 0716 12/30/22 0730 12/30/22 0745 12/30/22 0800   BP: (!) 142/79 130/76 138/73 (!) 143/84   Pulse: (!) 105 (!) 107 (!) 106 (!) 142   Resp: (!) 22 16 16 (!) 25   Temp:    37.4 °C (99.4 °F)   TempSrc:    Temporal   SpO2: 95% 97% 98% 94%   Weight:       Height:        Body mass index is 26.05 kg/m².    O2 therapy: Pulse Oximetry: 94 %, O2 (LPM): 8, O2 Delivery Device: Silicone Nasal Cannula    Date 12/30/22 0700 - 12/31/22 0659   Shift 5252-1812 2703-0370 3585-7298 24 Hour Total   INTAKE   P.O. 240   240     P.O. 240   240   I.V. 98.2   98.2     Volume (mL) (potassium chloride in water (KCL) ivpb (ICU ONLY) 40 mEq) 98.2   98.2   IV Piggyback 52.1   52.1     Volume (mL) (pantoprazole (Protonix) 80 mg in  mL Infusion) 52.1   52.1   Shift Total 390.3   390.3   OUTPUT   Shift Total       .3   390.3        Physical Exam  Vitals and nursing note reviewed.   Constitutional:       Appearance: He is ill-appearing. He is not diaphoretic.   HENT:      Head: Normocephalic and atraumatic.      Mouth/Throat:      Mouth: Mucous membranes are dry.   Eyes:      Conjunctiva/sclera: Conjunctivae  normal.      Pupils: Pupils are equal, round, and reactive to light.   Cardiovascular:      Rate and Rhythm: Normal rate and regular rhythm.      Pulses: Normal pulses.      Heart sounds: No murmur heard.  Pulmonary:      Effort: Pulmonary effort is normal.      Comments: Nasal cannula in place  Abdominal:      General: Abdomen is flat. There is no distension.      Palpations: Abdomen is soft.      Tenderness: There is no abdominal tenderness.   Musculoskeletal:      Right lower leg: No edema.      Left lower leg: No edema.   Skin:     General: Skin is warm and dry.      Capillary Refill: Capillary refill takes less than 2 seconds.      Coloration: Skin is pale.   Neurological:      Mental Status: He is alert and oriented to person, place, and time. Mental status is at baseline.   Psychiatric:         Mood and Affect: Mood normal.       Recent Labs     12/27/22  1420   ISTATAPH 7.242*   ISTATAPCO2 19.0*   ISTATAPO2 122*   ISTATATCO2 <10*   AVWJUOS3PLX 98   ISTATARTHCO3 8.2*   ISTATARTBE -18*   ISTATTEMP 36.8 C   ISTATFIO2 100   ISTATSPEC Arterial   ISTATAPHTC 7.244*   FIHTVDRO1GL 121*     Recent Labs     12/27/22  1620 12/27/22  2042 12/30/22  0245 12/30/22  0508 12/30/22  0510   SODIUM  --    < > 143 143 141   POTASSIUM  --    < > 3.3* 3.3* 3.2*   CHLORIDE  --    < > 107 107 105   CO2  --    < > 29 30 28   BUN  --    < > 16 15 16   CREATININE  --    < > 0.40* 0.39* 0.30*   MAGNESIUM 2.1  --   --   --   --    PHOSPHORUS 3.6  --   --   --   --    CALCIUM  --    < > 7.8* 7.8* 7.7*    < > = values in this interval not displayed.     Recent Labs     12/29/22  0240 12/29/22  2130 12/30/22  0245 12/30/22  0508 12/30/22  0510   ALTSGPT 184*  --  172* 164*  --    ASTSGOT 144*  --  87* 77*  --    ALKPHOSPHAT 62  --  145* 147*  --    TBILIRUBIN 1.7*  --  2.5* 2.2*  --    GLUCOSE 154*   < > 128* 168* 162*    < > = values in this interval not displayed.     Recent Labs     12/29/22  1145 12/29/22  1538 12/29/22  5082  12/29/22  2130 12/30/22  0021 12/30/22  0245 12/30/22  0510   RBC 2.66*   < >  --  3.39*  --  3.09* 2.98*   HEMOGLOBIN 7.8*   < >  --  9.7*  --  8.9* 8.6*   HEMATOCRIT 22.3*   < >  --  28.2*  --  25.4* 24.6*   PLATELETCT 11*   < >  --  9*  --  41* 33*   PROTHROMBTM 18.2*  --  16.8*  --  16.9*  --  16.5*   APTT 37.4*  --  36.3*  --   --  33.7  --    INR 1.55*  --  1.39*  --  1.40*  --  1.36*    < > = values in this interval not displayed.     Recent Labs     12/27/22  1346 12/27/22  1448 12/27/22  2042 12/28/22  0240 12/28/22  0900 12/28/22  1500 12/29/22  0240 12/29/22  0515 12/29/22  2130 12/30/22  0245 12/30/22  0508 12/30/22  0510   WBC 0.3*   < > 0.2*   < > 0.2*   < >  --    < > 0.1* 0.1*  --  0.1*   NEUTSPOLYS 0.00*  --  0.00*  --  2.60*  --   --   --   --   --   --   --    LYMPHOCYTES 96.10*  --  90.90*  --  97.40*  --   --   --   --   --   --   --    MONOCYTES 3.90  --  0.00  --  0.00  --   --   --   --   --   --   --    EOSINOPHILS 0.00  --  9.10*  --  0.00  --   --   --   --   --   --   --    BASOPHILS 0.00  --  0.00  --  0.00  --   --   --   --   --   --   --    ASTSGOT 46*  --   --    < >  --   --  144*  --   --  87* 77*  --    ALTSGPT 163*  --   --    < >  --   --  184*  --   --  172* 164*  --    ALKPHOSPHAT 44  --   --    < >  --   --  62  --   --  145* 147*  --    TBILIRUBIN 0.6  --   --    < >  --   --  1.7*  --   --  2.5* 2.2*  --     < > = values in this interval not displayed.       Meds:   hydrocortisone sodium succinate PF  50 mg      pantoprazole (PROTONIX) infusion  8 mg/hr 8 mg/hr (12/30/22 0805)    NORepinephrine  0-1 mcg/kg/min (Ideal) Stopped (12/29/22 1407)    vasopressin (PITRESSIN) infusion  0.03 Units/min Stopped (12/29/22 0832)    cefepime  2 g Stopped (12/30/22 0626)    ondansetron  4 mg      HYDROmorphone  0.4 mg      Or    HYDROmorphone  0.6 mg      insulin regular  1-6 Units      And    dextrose bolus  25 g 25 g (12/28/22 5206)        Procedures:  N/A    Imaging:  DX-CHEST-PORTABLE  (1 VIEW)   Final Result      Worsening bilateral pulmonary infiltrates.      DX-CHEST-PORTABLE (1 VIEW)   Final Result      1.  Stable patchy bilateral interstitial opacities which could be seen in setting of edema and/or infection.   2.  Stable mild enlargement of the cardiomediastinal silhouette.      DX-CHEST-PORTABLE (1 VIEW)   Final Result      1.  Supportive tubing as described above.   2.  No other significant change from prior exam.         DX-CHEST-PORTABLE (1 VIEW)   Final Result      No significant change from prior exam.      DX-CHEST-PORTABLE (1 VIEW)   Final Result      Worsening hypoinflation and bilateral perihilar infiltrates as well as prominence of the pulmonary vasculature suggesting pulmonary edema.  Superimposed pneumonia is not excluded.      EC-ECHOCARDIOGRAM LTD W/O CONT   Final Result      US-EXTREMITY VENOUS LOWER BILAT   Final Result      CT-ABDOMEN-PELVIS WITH   Final Result      1.  Hepatic steatosis.   2.  No acute inflammatory abnormality within the abdomen or pelvis.      CT-MAXILLOFACIAL W/O PLUS RECONS   Final Result      1.  Essentially negative maxillofacial sinus CT. Small retention cyst in the alveolar recess of the right maxillary sinus of no clinical significance.      CT-HEAD W/O   Final Result      Head CT without contrast within normal limits. No evidence of acute cerebral infarction, hemorrhage or mass lesion.         DX-CHEST-PORTABLE (1 VIEW)   Final Result      No evidence of acute cardiopulmonary process.          ASSESSEMENT and PLAN:    * Shock (HCC)- (present on admission)  Assessment & Plan  -Hemorrhagic shock from DIC  -Hemorrhagic gastritis evident  -NG tube placement for suction  -Every 4 hours CBC, transfuse for hemoglobin less than 7, transfuse for platelets less than 50 in the setting of active upper GI bleeding, and for platelets less than 30 if bleeding has tamponaded and ceased  -Every 4 hour PT/PTT/INR, transfuse FFP for INR greater than 2 if ongoing  active bleeding is evident  -Continue to trend Fibrinogen  -If evidence of developing volume overload, would begin albumin and IV Lasix   -GI has been consulted, there is no benefit from an endoscopic interventions and hemorrhagic gastritis from DIC  -IV Protonix drip  -Off of vasopressor support  -Continue stress dose steroids    Acute respiratory failure with hypoxia (HCC)  Assessment & Plan  CXR with cardiomegaly and diffuse infiltrates in setting of large volumes of blood product and IVF resuscitation  UOP good with Lasix  -Continue with Lasix 20mg IV BID  -Strict I/O's  -Wean supplemental O2 as tolerated  -Daily CXR AM    Gram-negative bacteremia  Assessment & Plan  E coli and Klebsiella bacteremia sensitive to Cefepime  ID following  -Repeat Blood cultures on 12/30  -Continue Cefepime  -Continue to monitor neutropenia, GSCF per heme/onc    Elevated troponin  Assessment & Plan  Likely demand ischemia from severe shock  No ischemic changes noted on EKG  Downtrended, no need to continue to monitor    Hypocalcemia  Assessment & Plan  Resolved  -Monitor carefully, as patient is getting a lot of blood products which chelate and worsen hypocalcemia    Hyperkalemia  Assessment & Plan  Noted in the setting of DIC  Resolved, now hypokalemic    Upper GI bleed  Assessment & Plan  -Upon admission patient noted to vomit almost a liter of blood  -Opinion is that this is likely from hemorrhagic gastritis likely in setting of recent high dose steroids with chemotherapy  -Also notable that patient was recently on steroids for 2 weeks, but was taking prophylactic H2 blockers  -Per GI opinion, no benefit in endoscopy until more clinically stable  -Treat DIC and hemorrhagic shock with supportive care  -IV Protonix drip    Neutropenic sepsis (HCC)  Assessment & Plan  This is Severe Sepsis Present on admission  SIRS criteria identified on my evaluation include: Tachycardia, with heart rate greater than 90 BPM, Tachypnea, with  respirations greater than 20 per minute and Leukopenia, with WBC less than 4,000  Clinical indicators of end organ dysfunction include Systolic blood pressure (SBP) <90 mmHg or mean arterial pressure <65 mmHg  Source is unknown  Sepsis protocol initiated  Crystalloid Fluid Administration: Fluid resuscitation ordered per standard protocol - 30 mL/kg per current or ideal body weight  IV antibiotics as appropriate for source of sepsis  Reassessment: I have reassessed the patient's hemodynamic status  Blood cultures with E coli and Klebsiella pneumoniae  MRSA nares negative  CT head, maxillofacial, abdomen/pelvis without any source of infection  Urinalysis also does not appear infected  -ID following, continue Cefepime  -Repeat blood cultures on 12/30  -Continue stress dose steroids  -Weaned off of vasopressor support    Hyperglycemia  Assessment & Plan  Recent steroids for the last 2 weeks  Currently on stress dose steroids  -LDSSI plus hypoglycemia protocol      Lactic acid acidosis  Assessment & Plan  Resolved    High anion gap metabolic acidosis  Assessment & Plan  In the setting of severe hypovolemia in the setting of hemorrhagic shock and acute kidney injury  Resolved    Acute kidney injury (HCC)  Assessment & Plan  Mostly likely secondary to hemorrhagic shock  Resolved with restoration of hemodynamics and ongoing blood product support  Strict I/O's  Resolved  Continue to monitor    At high risk for venous thromboembolism (VTE)  Assessment & Plan  -Hold home apixaban in setting of active bleeding and DIC    DIC (disseminated intravascular coagulation) (HCC)  Assessment & Plan  Fibrinogen <60 on admission trending up to 690s, no evidence of hemolysis on labs  Mild T bili elevation, LDH mild elevation to 290s, Haptoglobin normal  Pancytopenia in all cell lines and PTT/INR/PT all elevated also consistent with DIC  LE U/S negative for DVT  TTE EF 65% no wall motion abnormality  -Supportive care with transfusions of  "PRBC/platelets/FFP as detailed in \"Shock\"  -Heme/Onc followign  -Every 8 hours fibrinogen, every 6 hours coags and CBC  -Hold home Eliquis  -Pain control with IV narcotics prn ordered    Acute lymphoblastic leukemia (ALL) (HCC)  Assessment & Plan  Last chemotherapy treatment on 12/20/2022, where patient received vincristine, doxorubicin, Dexrazoxane, and PEG-aspargase  He has been on apixaban for the last 2 weeks due to risk for PEG-aspargase related thrombotic events  -Hold apixaban in setting of hemorrhagic shock from DIC  -He was also on steroids which could have exacerbated his hemorrhagic gastritis  -IV Protonix drip  -Heme/Onc following    Hypokalemia  Assessment & Plan  K scale ordered  Serial BMP monitoring  Replete as per protocol        DISPO: Continue ICU hospitalization    CODE STATUS: Full Code    Quality Measures:  Feeding: Regular  Analgesia: N/A  Sedation: N/A  Thromboprophylaxis: SCD's, chemical VTE PPX contraindicated in active bleeding  Head of bed: >30 degrees  Ulcer prophylaxis: Pantoprazole infusion  Glycemic control: Correctional: LDSSI / Basal: N/A  Bowel care: bowel regimen  Indwelling lines: R chest port, L AC PIV, R AC PIV, L FA PIV, Condom catheter  Deescalation of antibiotics: Cefepime, ID following      Adonay Anne M.D.    "

## 2022-12-30 NOTE — ASSESSMENT & PLAN NOTE
CXR with cardiomegaly and diffuse infiltrates in setting of large volumes of blood product and IVF resuscitation  UOP overshot goal after Lasix  -Discontinue Lasix 20mg IV BID, low threshold to resume if difficulty breathing with evidence volume overload.  -Strict I/O's  -Wean supplemental O2 as tolerated  -Daily CXR AM

## 2022-12-30 NOTE — CARE PLAN
Problem: Knowledge Deficit - Standard  Goal: Patient and family/care givers will demonstrate understanding of plan of care, disease process/condition, diagnostic tests and medications  Outcome: Progressing     Problem: Pain - Standard  Goal: Alleviation of pain or a reduction in pain to the patient’s comfort goal  Outcome: Progressing     Problem: Skin Integrity  Goal: Skin integrity is maintained or improved  Outcome: Progressing     Problem: Fall Risk  Goal: Patient will remain free from falls  Outcome: Progressing   The patient is Unstable - High likelihood or risk of patient condition declining or worsening    Shift Goals  Clinical Goals: reamain heodynamicly stable  Patient Goals: drink water, d/c NG tube  Family Goals: improvement of labs, hemodynamic stabilty    Progress made toward(s) clinical / shift goals: hemodynamic stability     Patient is not progressing towards the following goals:

## 2022-12-30 NOTE — PROGRESS NOTES
Dr. Araujo notified of WBC 0.1 and PLTS 9. One unit of PLTS just completed, will resend CBC. Per Dr. Araujo, no need to send further ionized calcium labs tonight.

## 2022-12-30 NOTE — CARE PLAN
The patient is Watcher - Medium risk of patient condition declining or worsening    Shift Goals  Clinical Goals: reamain heodynamicly stable  Patient Goals: drink water, d/c NG tube  Family Goals: improvement of labs, hemodynamic stabilty    Problem: Knowledge Deficit - Standard  Goal: Patient and family/care givers will demonstrate understanding of plan of care, disease process/condition, diagnostic tests and medications  Outcome: Progressing     Problem: Pain - Standard  Goal: Alleviation of pain or a reduction in pain to the patient’s comfort goal  Outcome: Progressing     Problem: Skin Integrity  Goal: Skin integrity is maintained or improved  Outcome: Progressing     Problem: Fall Risk  Goal: Patient will remain free from falls  Outcome: Progressing     Problem: Psychosocial  Goal: Patient's level of anxiety will decrease  Outcome: Progressing

## 2022-12-30 NOTE — ASSESSMENT & PLAN NOTE
E coli and Klebsiella bacteremia sensitive to Cefepime  ID following  -Repeat Blood cultures on 12/30 - NGTD  -Continue Cefepime  -Continue to monitor neutropenia, GSCF per heme/onc

## 2022-12-30 NOTE — PROGRESS NOTES
Pediatric Hematology/Oncology  Daily Progress Note      Patient Name:  Tomas Jean-Baptiste  : 2000  MRN: [id number]    Location of Service: [facility]  Date of Service: 2022  Time: 9:01 AM    Hospital Day: 4    Protocol / Treatment Plan: [place] Chromosome Precursor B-Cell Acute Lymphoblastic Leukemia, ON STUDY [id number], Delayed Intensification, Day 25    SUBJECTIVE:     Low grade temperature with Tmax of 99.4 F at 1200 today. Reports that back pain and leg pain is much improved with pain medications. Appearing more edematous. Increase in oxygen requirement to 8 L via NC. CXR yesterday and today showing pulmonary edema for which he is receiving scheduled lasix.   Platelet threshold decreased to 30,000/microliters to avoid fluid overload. Ongoing bloody stools. Continued on protonix drip.  Still requiring pRBCs and platelet transfusion to maintain above threshold.  Off of vasopressors and maintaining adequate BP. Tolerated sips of water and advancing diet slowly. Able to drink smoothie . Blotchy erythematous rash on the face and trunk persists, non bothersome. Continues on cefepime. Repeat blood culture obtained today. On sliding scale insulin for elevated serum glucose.    Review of Systems:     Constitutional: Critically ill but improving slowly, appears more edematous  HENT: Negative for auditory changes or ear pain, no congestion and rhinorrhea, no sore throat.  No mouth sores. Crusted blood on the upper lip.  Eyes: Negative for visual changes.  Respiratory: Oxygen supplement via NC  Cardiovascular: Positive for leg swelling after fluid resuscitation  Gastrointestinal: Negative for nausea, vomiting, abdominal pain, diarrhea, constipation. Blood in stool. NG removed  Genitourinary: Negative for dysuria.  Musculoskeletal: Positive for back pain which is improving. Leg pain improving    Skin: Positive for blotchy, erythematous rash  Neurological: Alert and  "oriented  Endo/Heme/Allergies: Bleeds easily   Psychiatric/Behavioral: Anxious    OBJECTIVE:     Max Temp: Temp (24hrs), Av.4 °C (97.6 °F), Min:35.8 °C (96.5 °F), Max:37.7 °C (99.9 °F)      Vitals: /80   Pulse 97   Temp 37.4 °C (99.4 °F) (Temporal)   Resp 16   Ht 1.664 m (5' 5.5\")   Wt 72.1 kg (158 lb 15.2 oz)   SpO2 97%   BMI 26.05 kg/m²     I/O:   Intake/Output Summary (Last 24 hours) at 2022 0901  Last data filed at 2022 0805  Gross per 24 hour   Intake 2435.48 ml   Output 7600 ml   Net -5164.52 ml         Labs:  Recent Labs     22  0900 22  1500 22  0510 22  0835 22  1111 22  1632   WBC 0.2*   < > 0.1* 0.1* 0.1* 0.1*   RBC 2.81*   < > 2.98* 2.85* 2.72* 3.54*   HEMOGLOBIN 8.6*   < > 8.6* 8.3* 7.9* 10.1*   HEMATOCRIT 24.5*   < > 24.6* 23.5* 22.5* 28.8*   MCV 87.2   < > 82.6 82.5 82.7 81.4   MCH 30.6   < > 28.9 29.1 29.0 28.5   RDW 55.8*   < > 48.6 48.4 49.1 48.4   PLATELETCT 37*   < > 33* 18* 38* 24*   MPV 11.6   < > 11.4 11.0 11.5  --    NEUTSPOLYS 2.60*  --   --   --   --   --    LYMPHOCYTES 97.40*  --   --   --   --   --    MONOCYTES 0.00  --   --   --   --   --    EOSINOPHILS 0.00  --   --   --   --   --    BASOPHILS 0.00  --   --   --   --   --    RBCMORPHOLO Present  --   --   --   --   --     < > = values in this interval not displayed.     Recent Labs     22  0510 22  1111 22  1632   SODIUM 141 143 140   POTASSIUM 3.2* 2.9* 2.9*   CHLORIDE 105 107 102   CO2 28 31 32   GLUCOSE 162* 131* 172*   BUN 16 14 14         Component 3 d ago    Significant Indicator POS Positive (POS)    Source BLD    Site PERIPHERAL    Culture Result Growth detected by Bactec instrument. 2022  21:20 Abnormal     Culture Result Escherichia coli Abnormal     Culture Result Klebsiella pneumoniae Abnormal             CXR: 2022  There worsening bilateral perihilar infiltrates.  There is no pleural effusion.  The heart is enlarged.  There is a " right subclavian Port-A-Cath.      Physical Exam:    Constitutional: Much improved, pallor improved. Edematous but improving  HENT: Normocephalic and atraumatic. Alopecia.  No rhinorrhea. Dried lips, some crusted blood inside of the mouth noted  Eyes: Conjunctivae are normal. EOMI.   Neck: Normal range of motion of neck, no adenopathy.    Cardiovascular: HR much improved, regular rhythm. DP/radial pulses 2+, cap refill < 2 sec  Pulmonary/Chest: Effort normall. No respiratory distress. Symmetric expansion.  No crackles or wheezes.  Abdomen: Soft. Bowel sounds are normal. No distension and no mass. There is no hepatosplenomegaly.   Genitourinary:  Deferred  Musculoskeletal: Not assessed  Neurological: Alert and oriented to person and place.   Skin: Blotchy, erythematous rash on the face and trunk  Psychiatric : Anxious    ASSESSMENT AND PLAN:     Tomas Jean-Baptiste is a 22 y.o. male with [place] Chromosome Precursor B-Cell Acute Lymphoblastic Leukemia who is receiving therapy per protocol [id number], on study presents to the [facility] Emergency Department with gram negative septic shock and DIC. Culture now growing (within 7 hrs of drawing blood culture) E.Coli and Klebsiella. Remains on Cefepime. Critically ill requiring intensive care. Clinically improving after agressive management.    E.coli and Klebsiella sepsis/septic shock in an immunocompromised host:  - Blood culture from 12/27/2022: E.coli and Klebsiella  - Susceptible to cefepime, continue  - Wean pressors per CICU: currently off of pressors  - Repeat blood culture obtained today  - Continue stress dose hydrocortisone (changed to hydrocortisone 50 mg every 6 hrs)  - ID consulted for further input, they recommend removal of port when stable     Ph+ Precursor B-Cell Acute Lymphoblastic Leukemia, in MRD Remission: Receiving therapy   TAT [id number], AR Arm B, Delayed Intensification, Day 25  - HOLD imatinib given patient critically  ill  - Not due for any chemotherapy until Day 29 which will be delayed due to current tenuous clinical condition  - If no improvement in clinical status, will consider starting G-CSF vs granulocyte infusion  - HOLD apixaban that was started on Day 8 to prevent pegaspargase induced clots.  - HOLD Bactrim (for PJP prophylaxis) this weekend given patient severely myelosuppressed. Restart next weekend or when ready.     Therapy related pancytopenia:  - Transfuse to maintain hemoglobin >8 gm/dL or with symptoms/volume replacement for hypotension  - Transfuse to maintain platelets >30,000/microliters (threshold higher given ongoing hematochezia) or with symptoms/volume replacement for hypotension  - Use irradiated/leukoreduced products  - CBC daily      DIC secondary to septic shock: improving  - S/p FFP and cryoprecipitate   - Fibrinogen level elevated, PTT WNL but PT still prolonged  - Continue to monitor DIC panel   - On IV protonix BID due to bloody emesis/stool    Blotchy erythematous rash:  - Likely multifactorial: fever, transfusion related, engraftment?  - Currently, non bothersome. Will continue to monitor     Rest of the management per ICU team. Discussed with ICU attending, patient's mother and Dr. Faye. Patient remains critically ill.         Alyce Duenas MD  Pediatric Hematology / Oncology  [facility]  Cell.  [phone]  [facility]. [phone]

## 2022-12-31 ENCOUNTER — HOSPITAL ENCOUNTER (OUTPATIENT)
Dept: RADIOLOGY | Facility: MEDICAL CENTER | Age: 22
End: 2022-12-31
Attending: INTERNAL MEDICINE
Payer: COMMERCIAL

## 2022-12-31 LAB
ALBUMIN SERPL BCP-MCNC: 1.8 G/DL (ref 3.2–4.9)
ALBUMIN/GLOB SERPL: 0.7 G/DL
ALP SERPL-CCNC: 170 U/L (ref 30–99)
ALT SERPL-CCNC: 117 U/L (ref 2–50)
ANION GAP SERPL CALC-SCNC: 3 MMOL/L (ref 7–16)
ANION GAP SERPL CALC-SCNC: 4 MMOL/L (ref 7–16)
ANION GAP SERPL CALC-SCNC: 4 MMOL/L (ref 7–16)
ANION GAP SERPL CALC-SCNC: 5 MMOL/L (ref 7–16)
APTT PPP: 29 SEC (ref 24.7–36)
APTT PPP: 34.1 SEC (ref 24.7–36)
APTT PPP: 34.7 SEC (ref 24.7–36)
AST SERPL-CCNC: 35 U/L (ref 12–45)
BASE EXCESS BLDV CALC-SCNC: 8 MMOL/L (ref -4–3)
BILIRUB SERPL-MCNC: 0.9 MG/DL (ref 0.1–1.5)
BODY TEMPERATURE: ABNORMAL DEGREES
BUN SERPL-MCNC: 11 MG/DL (ref 8–22)
BUN SERPL-MCNC: 12 MG/DL (ref 8–22)
CA-I SERPL-SCNC: 1.1 MMOL/L (ref 1.1–1.3)
CALCIUM ALBUM COR SERPL-MCNC: 9.2 MG/DL (ref 8.5–10.5)
CALCIUM SERPL-MCNC: 7.3 MG/DL (ref 8.5–10.5)
CALCIUM SERPL-MCNC: 7.4 MG/DL (ref 8.5–10.5)
CALCIUM SERPL-MCNC: 7.5 MG/DL (ref 8.5–10.5)
CALCIUM SERPL-MCNC: 7.6 MG/DL (ref 8.5–10.5)
CHLORIDE SERPL-SCNC: 101 MMOL/L (ref 96–112)
CHLORIDE SERPL-SCNC: 103 MMOL/L (ref 96–112)
CHLORIDE SERPL-SCNC: 98 MMOL/L (ref 96–112)
CHLORIDE SERPL-SCNC: 99 MMOL/L (ref 96–112)
CO2 BLDV-SCNC: 34 MMOL/L (ref 20–33)
CO2 SERPL-SCNC: 31 MMOL/L (ref 20–33)
CO2 SERPL-SCNC: 32 MMOL/L (ref 20–33)
CREAT SERPL-MCNC: 0.26 MG/DL (ref 0.5–1.4)
CREAT SERPL-MCNC: 0.29 MG/DL (ref 0.5–1.4)
CREAT SERPL-MCNC: 0.3 MG/DL (ref 0.5–1.4)
CREAT SERPL-MCNC: 0.37 MG/DL (ref 0.5–1.4)
DELSYS IDSYS: ABNORMAL
ERYTHROCYTE [DISTWIDTH] IN BLOOD BY AUTOMATED COUNT: 49.7 FL (ref 35.9–50)
ERYTHROCYTE [DISTWIDTH] IN BLOOD BY AUTOMATED COUNT: 49.8 FL (ref 35.9–50)
ERYTHROCYTE [DISTWIDTH] IN BLOOD BY AUTOMATED COUNT: 49.9 FL (ref 35.9–50)
ERYTHROCYTE [DISTWIDTH] IN BLOOD BY AUTOMATED COUNT: 51.3 FL (ref 35.9–50)
FIBRINOGEN PPP-MCNC: 770 MG/DL (ref 215–460)
GFR SERPLBLD CREATININE-BSD FMLA CKD-EPI: 163 ML/MIN/1.73 M 2
GFR SERPLBLD CREATININE-BSD FMLA CKD-EPI: 173 ML/MIN/1.73 M 2
GFR SERPLBLD CREATININE-BSD FMLA CKD-EPI: 175 ML/MIN/1.73 M 2
GFR SERPLBLD CREATININE-BSD FMLA CKD-EPI: 181 ML/MIN/1.73 M 2
GLOBULIN SER CALC-MCNC: 2.6 G/DL (ref 1.9–3.5)
GLUCOSE BLD STRIP.AUTO-MCNC: 119 MG/DL (ref 65–99)
GLUCOSE BLD STRIP.AUTO-MCNC: 123 MG/DL (ref 65–99)
GLUCOSE SERPL-MCNC: 134 MG/DL (ref 65–99)
GLUCOSE SERPL-MCNC: 178 MG/DL (ref 65–99)
GLUCOSE SERPL-MCNC: 178 MG/DL (ref 65–99)
GLUCOSE SERPL-MCNC: 205 MG/DL (ref 65–99)
HCO3 BLDV-SCNC: 33 MMOL/L (ref 24–28)
HCT VFR BLD AUTO: 25.3 % (ref 42–52)
HCT VFR BLD AUTO: 25.5 % (ref 42–52)
HCT VFR BLD AUTO: 25.6 % (ref 42–52)
HCT VFR BLD AUTO: 25.7 % (ref 42–52)
HGB BLD-MCNC: 8.5 G/DL (ref 14–18)
HGB BLD-MCNC: 8.8 G/DL (ref 14–18)
HGB BLD-MCNC: 8.8 G/DL (ref 14–18)
HGB BLD-MCNC: 8.9 G/DL (ref 14–18)
INR PPP: 1.17 (ref 0.87–1.13)
INR PPP: 1.23 (ref 0.87–1.13)
INR PPP: 1.24 (ref 0.87–1.13)
INR PPP: 1.32 (ref 0.87–1.13)
LACTATE SERPL-SCNC: 0.7 MMOL/L (ref 0.5–2)
LACTATE SERPL-SCNC: 2 MMOL/L (ref 0.5–2)
LPM ILPM: 3 LPM
MAGNESIUM SERPL-MCNC: 2.4 MG/DL (ref 1.5–2.5)
MCH RBC QN AUTO: 27.8 PG (ref 27–33)
MCH RBC QN AUTO: 28 PG (ref 27–33)
MCH RBC QN AUTO: 28.1 PG (ref 27–33)
MCH RBC QN AUTO: 28.5 PG (ref 27–33)
MCHC RBC AUTO-ENTMCNC: 33.6 G/DL (ref 33.7–35.3)
MCHC RBC AUTO-ENTMCNC: 34.4 G/DL (ref 33.7–35.3)
MCHC RBC AUTO-ENTMCNC: 34.5 G/DL (ref 33.7–35.3)
MCHC RBC AUTO-ENTMCNC: 34.6 G/DL (ref 33.7–35.3)
MCV RBC AUTO: 81.5 FL (ref 81.4–97.8)
MCV RBC AUTO: 81.5 FL (ref 81.4–97.8)
MCV RBC AUTO: 82.4 FL (ref 81.4–97.8)
MCV RBC AUTO: 82.7 FL (ref 81.4–97.8)
NEUTROPHILS # BLD AUTO: ABNORMAL K/UL (ref 1.82–7.42)
NRBC # BLD AUTO: 0.04 K/UL
NRBC BLD-RTO: 26.7 /100 WBC
PCO2 BLDV: 50 MMHG (ref 41–51)
PCO2 TEMP ADJ BLDV: 50.1 MMHG (ref 41–51)
PH BLDV: 7.43 [PH] (ref 7.31–7.45)
PH TEMP ADJ BLDV: 7.43 [PH] (ref 7.31–7.45)
PHOSPHATE SERPL-MCNC: 2 MG/DL (ref 2.5–4.5)
PLATELET # BLD AUTO: 21 K/UL (ref 164–446)
PLATELET # BLD AUTO: 27 K/UL (ref 164–446)
PLATELET # BLD AUTO: 31 K/UL (ref 164–446)
PLATELET # BLD AUTO: 54 K/UL (ref 164–446)
PMV BLD AUTO: 10 FL (ref 9–12.9)
PMV BLD AUTO: 10.3 FL (ref 9–12.9)
PMV BLD AUTO: 9.8 FL (ref 9–12.9)
PMV BLD AUTO: 9.9 FL (ref 9–12.9)
PO2 BLDV: 31 MMHG (ref 25–40)
PO2 TEMP ADJ BLDV: 32 MMHG (ref 25–40)
POTASSIUM SERPL-SCNC: 3.5 MMOL/L (ref 3.6–5.5)
POTASSIUM SERPL-SCNC: 3.8 MMOL/L (ref 3.6–5.5)
PROT SERPL-MCNC: 4.4 G/DL (ref 6–8.2)
PROTHROMBIN TIME: 14.8 SEC (ref 12–14.6)
PROTHROMBIN TIME: 15.3 SEC (ref 12–14.6)
PROTHROMBIN TIME: 15.4 SEC (ref 12–14.6)
PROTHROMBIN TIME: 16.2 SEC (ref 12–14.6)
RBC # BLD AUTO: 3.06 M/UL (ref 4.7–6.1)
RBC # BLD AUTO: 3.12 M/UL (ref 4.7–6.1)
RBC # BLD AUTO: 3.13 M/UL (ref 4.7–6.1)
RBC # BLD AUTO: 3.14 M/UL (ref 4.7–6.1)
SAO2 % BLDV: 61 %
SODIUM SERPL-SCNC: 134 MMOL/L (ref 135–145)
SODIUM SERPL-SCNC: 135 MMOL/L (ref 135–145)
SODIUM SERPL-SCNC: 137 MMOL/L (ref 135–145)
SODIUM SERPL-SCNC: 138 MMOL/L (ref 135–145)
SPECIMEN DRAWN FROM PATIENT: ABNORMAL
WBC # BLD AUTO: 0.1 K/UL (ref 4.8–10.8)
WBC # BLD AUTO: 0.2 K/UL (ref 4.8–10.8)
WBC # BLD AUTO: 0.2 K/UL (ref 4.8–10.8)
WBC # BLD AUTO: 0.3 K/UL (ref 4.8–10.8)

## 2022-12-31 PROCEDURE — 82962 GLUCOSE BLOOD TEST: CPT | Mod: 91

## 2022-12-31 PROCEDURE — 700105 HCHG RX REV CODE 258: Performed by: INTERNAL MEDICINE

## 2022-12-31 PROCEDURE — P9034 PLATELETS, PHERESIS: HCPCS | Mod: 91

## 2022-12-31 PROCEDURE — 80048 BASIC METABOLIC PNL TOTAL CA: CPT

## 2022-12-31 PROCEDURE — 86945 BLOOD PRODUCT/IRRADIATION: CPT

## 2022-12-31 PROCEDURE — 82803 BLOOD GASES ANY COMBINATION: CPT

## 2022-12-31 PROCEDURE — 85384 FIBRINOGEN ACTIVITY: CPT

## 2022-12-31 PROCEDURE — 85730 THROMBOPLASTIN TIME PARTIAL: CPT | Mod: 91

## 2022-12-31 PROCEDURE — 700105 HCHG RX REV CODE 258: Performed by: STUDENT IN AN ORGANIZED HEALTH CARE EDUCATION/TRAINING PROGRAM

## 2022-12-31 PROCEDURE — 770022 HCHG ROOM/CARE - ICU (200)

## 2022-12-31 PROCEDURE — 85610 PROTHROMBIN TIME: CPT | Mod: 91

## 2022-12-31 PROCEDURE — 700111 HCHG RX REV CODE 636 W/ 250 OVERRIDE (IP): Performed by: STUDENT IN AN ORGANIZED HEALTH CARE EDUCATION/TRAINING PROGRAM

## 2022-12-31 PROCEDURE — 700101 HCHG RX REV CODE 250: Performed by: STUDENT IN AN ORGANIZED HEALTH CARE EDUCATION/TRAINING PROGRAM

## 2022-12-31 PROCEDURE — 83735 ASSAY OF MAGNESIUM: CPT

## 2022-12-31 PROCEDURE — 82330 ASSAY OF CALCIUM: CPT

## 2022-12-31 PROCEDURE — 85027 COMPLETE CBC AUTOMATED: CPT

## 2022-12-31 PROCEDURE — 99233 SBSQ HOSP IP/OBS HIGH 50: CPT | Performed by: INTERNAL MEDICINE

## 2022-12-31 PROCEDURE — C9113 INJ PANTOPRAZOLE SODIUM, VIA: HCPCS | Performed by: INTERNAL MEDICINE

## 2022-12-31 PROCEDURE — 700111 HCHG RX REV CODE 636 W/ 250 OVERRIDE (IP): Performed by: INTERNAL MEDICINE

## 2022-12-31 PROCEDURE — 99291 CRITICAL CARE FIRST HOUR: CPT | Mod: GC | Performed by: INTERNAL MEDICINE

## 2022-12-31 PROCEDURE — 83605 ASSAY OF LACTIC ACID: CPT

## 2022-12-31 PROCEDURE — 71045 X-RAY EXAM CHEST 1 VIEW: CPT

## 2022-12-31 PROCEDURE — 85025 COMPLETE CBC W/AUTO DIFF WBC: CPT

## 2022-12-31 PROCEDURE — 84100 ASSAY OF PHOSPHORUS: CPT

## 2022-12-31 PROCEDURE — 99232 SBSQ HOSP IP/OBS MODERATE 35: CPT | Performed by: PEDIATRICS

## 2022-12-31 PROCEDURE — 306581 SET INFUSION,POWERLOC 20G X 1: Performed by: INTERNAL MEDICINE

## 2022-12-31 PROCEDURE — 80053 COMPREHEN METABOLIC PANEL: CPT

## 2022-12-31 PROCEDURE — 36430 TRANSFUSION BLD/BLD COMPNT: CPT

## 2022-12-31 PROCEDURE — 99292 CRITICAL CARE ADDL 30 MIN: CPT | Mod: GC | Performed by: INTERNAL MEDICINE

## 2022-12-31 RX ORDER — POTASSIUM CHLORIDE 7.45 MG/ML
10 INJECTION INTRAVENOUS ONCE
Status: COMPLETED | OUTPATIENT
Start: 2022-12-31 | End: 2022-12-31

## 2022-12-31 RX ORDER — POTASSIUM CHLORIDE 14.9 MG/ML
20 INJECTION INTRAVENOUS ONCE
Status: COMPLETED | OUTPATIENT
Start: 2022-12-31 | End: 2022-12-31

## 2022-12-31 RX ORDER — POTASSIUM CHLORIDE 14.9 MG/ML
20 INJECTION INTRAVENOUS ONCE
Status: COMPLETED | OUTPATIENT
Start: 2022-12-31 | End: 2023-01-01

## 2022-12-31 RX ORDER — POTASSIUM CHLORIDE 7.45 MG/ML
10 INJECTION INTRAVENOUS ONCE
Status: COMPLETED | OUTPATIENT
Start: 2022-12-31 | End: 2023-01-01

## 2022-12-31 RX ORDER — POTASSIUM CHLORIDE 20 MEQ/1
40 TABLET, EXTENDED RELEASE ORAL ONCE
Status: DISCONTINUED | OUTPATIENT
Start: 2022-12-31 | End: 2022-12-31

## 2022-12-31 RX ORDER — PANTOPRAZOLE SODIUM 40 MG/10ML
40 INJECTION, POWDER, LYOPHILIZED, FOR SOLUTION INTRAVENOUS 2 TIMES DAILY
Status: DISCONTINUED | OUTPATIENT
Start: 2022-12-31 | End: 2023-01-01

## 2022-12-31 RX ADMIN — PANTOPRAZOLE SODIUM 40 MG: 40 INJECTION, POWDER, FOR SOLUTION INTRAVENOUS at 17:34

## 2022-12-31 RX ADMIN — HYDROMORPHONE HYDROCHLORIDE 0.6 MG: 1 INJECTION, SOLUTION INTRAMUSCULAR; INTRAVENOUS; SUBCUTANEOUS at 20:13

## 2022-12-31 RX ADMIN — FUROSEMIDE 20 MG: 10 INJECTION, SOLUTION INTRAMUSCULAR; INTRAVENOUS at 05:58

## 2022-12-31 RX ADMIN — FUROSEMIDE 20 MG: 10 INJECTION, SOLUTION INTRAMUSCULAR; INTRAVENOUS at 15:32

## 2022-12-31 RX ADMIN — CEFEPIME 2 G: 2 INJECTION, POWDER, FOR SOLUTION INTRAVENOUS at 05:57

## 2022-12-31 RX ADMIN — HYDROMORPHONE HYDROCHLORIDE 0.6 MG: 1 INJECTION, SOLUTION INTRAMUSCULAR; INTRAVENOUS; SUBCUTANEOUS at 04:42

## 2022-12-31 RX ADMIN — CEFEPIME 2 G: 2 INJECTION, POWDER, FOR SOLUTION INTRAVENOUS at 22:24

## 2022-12-31 RX ADMIN — INSULIN HUMAN 1 UNITS: 100 INJECTION, SOLUTION PARENTERAL at 11:02

## 2022-12-31 RX ADMIN — POTASSIUM CHLORIDE 20 MEQ: 14.9 INJECTION, SOLUTION INTRAVENOUS at 06:59

## 2022-12-31 RX ADMIN — HYDROMORPHONE HYDROCHLORIDE 0.6 MG: 1 INJECTION, SOLUTION INTRAMUSCULAR; INTRAVENOUS; SUBCUTANEOUS at 07:09

## 2022-12-31 RX ADMIN — POTASSIUM CHLORIDE 20 MEQ: 14.9 INJECTION, SOLUTION INTRAVENOUS at 02:35

## 2022-12-31 RX ADMIN — PANTOPRAZOLE SODIUM 40 MG: 40 INJECTION, POWDER, FOR SOLUTION INTRAVENOUS at 08:59

## 2022-12-31 RX ADMIN — POTASSIUM PHOSPHATE, MONOBASIC AND POTASSIUM PHOSPHATE, DIBASIC 30 MMOL: 224; 236 INJECTION, SOLUTION, CONCENTRATE INTRAVENOUS at 07:55

## 2022-12-31 RX ADMIN — PANTOPRAZOLE SODIUM 8 MG/HR: 40 INJECTION, POWDER, FOR SOLUTION INTRAVENOUS at 04:12

## 2022-12-31 RX ADMIN — HYDROMORPHONE HYDROCHLORIDE 0.6 MG: 1 INJECTION, SOLUTION INTRAMUSCULAR; INTRAVENOUS; SUBCUTANEOUS at 15:40

## 2022-12-31 RX ADMIN — HYDROMORPHONE HYDROCHLORIDE 0.6 MG: 1 INJECTION, SOLUTION INTRAMUSCULAR; INTRAVENOUS; SUBCUTANEOUS at 01:19

## 2022-12-31 RX ADMIN — POTASSIUM CHLORIDE 10 MEQ: 7.46 INJECTION, SOLUTION INTRAVENOUS at 14:41

## 2022-12-31 RX ADMIN — POTASSIUM CHLORIDE 10 MEQ: 7.46 INJECTION, SOLUTION INTRAVENOUS at 04:44

## 2022-12-31 RX ADMIN — HYDROCORTISONE SODIUM SUCCINATE 50 MG: 100 INJECTION, POWDER, FOR SOLUTION INTRAMUSCULAR; INTRAVENOUS at 05:57

## 2022-12-31 RX ADMIN — CEFEPIME 2 G: 2 INJECTION, POWDER, FOR SOLUTION INTRAVENOUS at 13:35

## 2022-12-31 RX ADMIN — POTASSIUM CHLORIDE 20 MEQ: 14.9 INJECTION, SOLUTION INTRAVENOUS at 22:32

## 2022-12-31 RX ADMIN — HYDROCORTISONE SODIUM SUCCINATE 50 MG: 100 INJECTION, POWDER, FOR SOLUTION INTRAMUSCULAR; INTRAVENOUS at 11:00

## 2022-12-31 RX ADMIN — HYDROCORTISONE SODIUM SUCCINATE 50 MG: 100 INJECTION, POWDER, FOR SOLUTION INTRAMUSCULAR; INTRAVENOUS at 17:34

## 2022-12-31 RX ADMIN — POTASSIUM CHLORIDE 20 MEQ: 14.9 INJECTION, SOLUTION INTRAVENOUS at 12:40

## 2022-12-31 RX ADMIN — HYDROMORPHONE HYDROCHLORIDE 0.6 MG: 1 INJECTION, SOLUTION INTRAMUSCULAR; INTRAVENOUS; SUBCUTANEOUS at 11:51

## 2022-12-31 RX ADMIN — HYDROMORPHONE HYDROCHLORIDE 0.6 MG: 1 INJECTION, SOLUTION INTRAMUSCULAR; INTRAVENOUS; SUBCUTANEOUS at 17:46

## 2022-12-31 ASSESSMENT — ENCOUNTER SYMPTOMS
CHILLS: 0
HEADACHES: 0
CONSTIPATION: 0
HEMOPTYSIS: 0
SHORTNESS OF BREATH: 1
WEAKNESS: 1
DIARRHEA: 0
COUGH: 0
FEVER: 0
SPUTUM PRODUCTION: 0
BLOOD IN STOOL: 1
SHORTNESS OF BREATH: 0
NAUSEA: 0
DIAPHORESIS: 0
BLURRED VISION: 0
ABDOMINAL PAIN: 0
VOMITING: 0
PALPITATIONS: 0
DIZZINESS: 0

## 2022-12-31 ASSESSMENT — PAIN DESCRIPTION - PAIN TYPE
TYPE: ACUTE PAIN
TYPE: ACUTE PAIN;CHRONIC PAIN
TYPE: ACUTE PAIN

## 2022-12-31 ASSESSMENT — FIBROSIS 4 INDEX: FIB4 SCORE: 3.24

## 2022-12-31 NOTE — PROGRESS NOTES
[facility] ICU Progress Note      Admit Date: 12/27/2022    Resident(s): Milo Garcia M.D.   Attending:  EDMUND BONILLA/ Dr. Bernal    Patient ID:    Name:  Tomas Jean-Baptiste   YOB: 2000  Age:  22 y.o.  male   MRN:  [id number]    Hospital Course (carried forward and updated):  Tomas Jean-Baptiste is a 22 y.o. male with has a history of B cell acute lymphoblastic leukemia.  He has been on imatinib therapy since June 2022, and last received chemotherapy on 12/20/2022, which included vincristine, doxorubicin, Dexrazoxane, and PEG-aspargase.  He was then placed on a 7-day course of dexamethasone 9 mg oral twice daily, and did take famotidine for GI prophylaxis with this.  He also continued taking his imatinib.  And due to being at risk for thrombotic events from the PEG-aspargase, he was restarted 2 weeks before admission on apixaban 5 mg p.o. twice daily which she was taking at home up until presentation.        Presentation is most consistent with DIC. Hematology/oncology consulted. Patient did vomit a liter of blood from his stomach. Gastroenterology was notified, and opinion is that this is likely hemorrhagic gastritis from DIC and that there is no benefit in endoscopic intervention. Patient received 4 unit prbc, 2 unit platelet 2 unit cryo, his laboratories considerably improved after resus. Patient admitted to ICU for further management.     12/28 Patient NG tube removed, tolerating diet so far, advancing slowly. Laboratories considerably improving after overnight colloid resus. Clinically appears improving as well. Vancomycin discontinued, ID consulted for gram negative bacteremia. PPI drip changed to BID IV.    12/29 Continued to receive multiple transfusions. PPI gtt restarted. Continued to have maroon stools.    12/30 Continues to have bowel movements that are becoming darker. Received 1u PLT this AM and 1u pRBC on repeat labs. Still on 8L NC, UOP 7.6L on diuresis. Bcx with E coli  and Klebsiella sensitive to Cefepime. Repeat Bcx ordered.       Consultants:  Critical Care  Hematology/Oncology  Infectious Disease      Interval Events:    Required additional platelets this AM. Worsening movements with nurses, requires PT eval and treat. Still black BM. Hg stabilized, platelets still low.    Vitals Range last 24h:  Temp:  [36.3 °C (97.4 °F)-37.6 °C (99.7 °F)] 37.3 °C (99.2 °F)  Pulse:  [] 118  Resp:  [12-23] 14  BP: (127-156)/(66-96) 127/66  SpO2:  [91 %-100 %] 97 %      Intake/Output Summary (Last 24 hours) at 12/31/2022 1307  Last data filed at 12/31/2022 1254  Gross per 24 hour   Intake 2776.42 ml   Output 9650 ml   Net -6873.58 ml          Review of Systems   Constitutional:  Positive for malaise/fatigue. Negative for chills, diaphoresis and fever.   Eyes:  Negative for blurred vision.   Respiratory:  Positive for shortness of breath. Negative for cough and sputum production.    Cardiovascular:  Negative for chest pain, palpitations and leg swelling.   Gastrointestinal:  Positive for blood in stool. Negative for abdominal pain, constipation, diarrhea, nausea and vomiting.   Genitourinary:  Negative for dysuria and hematuria.   Neurological:  Positive for weakness. Negative for dizziness and headaches.     PHYSICAL EXAM:  Vitals:    12/31/22 1105 12/31/22 1115 12/31/22 1124 12/31/22 1200   BP: (!) 140/84 (!) 146/90 (!) 148/86 127/66   Pulse: (!) 126 (!) 116 (!) 126 (!) 118   Resp: 20 19 19 14   Temp: 37.1 °C (98.8 °F)  37.6 °C (99.7 °F) 37.3 °C (99.2 °F)   TempSrc:   Temporal Temporal   SpO2: 98% 97% 97% 97%   Weight:       Height:        Body mass index is 26.37 kg/m².    O2 therapy: Pulse Oximetry: 97 %, O2 (LPM): 4, O2 Delivery Device: Silicone Nasal Cannula    Date 12/31/22 0700 - 01/01/23 0659   Shift 1014-3071 1705-1103 1681-9519 24 Hour Total   INTAKE   P.O. 200   200     P.O. 200   200   Blood 39.6   39.6     Volume (RELEASE PLATELET PHERESIS) 39.6   39.6   IV Piggyback 502.9    502.9     Volume (mL) (pantoprazole (Protonix) 80 mg in  mL Infusion) 67.3   67.3     Volume (mL) (potassium chloride in water (KCL) ivpb **Administer in ICU only** 20 mEq) 85.2   85.2     Volume (mL) (potassium phosphate IVPB 30 mmol in 500 mL D5W (premix)) 350.5   350.5   Shift Total 742.5   742.5   OUTPUT   Urine 3600   3600     Number of Times Voided 1 x   1 x     Output (mL) (External Urinary Catheter (Condom)) 3600   3600   Stool         Number of Times Stooled 0 x   0 x   Shift Total 3600   3600   NET -2857.5   -2857.5          Physical Exam  Vitals and nursing note reviewed.   Constitutional:       Appearance: He is ill-appearing. He is not diaphoretic.   HENT:      Head: Normocephalic and atraumatic.      Mouth/Throat:      Mouth: Mucous membranes are dry.   Eyes:      Conjunctiva/sclera: Conjunctivae normal.      Pupils: Pupils are equal, round, and reactive to light.   Cardiovascular:      Rate and Rhythm: Normal rate and regular rhythm.      Pulses: Normal pulses.      Heart sounds: No murmur heard.  Pulmonary:      Effort: Pulmonary effort is normal.      Comments: Nasal cannula in place  Abdominal:      General: Abdomen is flat. There is no distension.      Palpations: Abdomen is soft.      Tenderness: There is no abdominal tenderness.   Musculoskeletal:      Right lower leg: No edema.      Left lower leg: No edema.   Skin:     General: Skin is warm and dry.      Capillary Refill: Capillary refill takes less than 2 seconds.      Coloration: Skin is pale.   Neurological:      Mental Status: He is alert and oriented to person, place, and time. Mental status is at baseline.   Psychiatric:         Mood and Affect: Mood normal.             Recent Labs     12/30/22  1632 12/31/22  0108 12/31/22  0551 12/31/22  1100   SODIUM 140 137 138 135   POTASSIUM 2.9* 3.5* 3.8 3.5*   CHLORIDE 102 101 103 99   CO2 32 32 32 32   BUN 14 11 11 11   CREATININE 0.36* 0.37* 0.26* 0.29*   MAGNESIUM 1.9  --  2.4  --     PHOSPHORUS 1.9*  --  2.0*  --    CALCIUM 7.7* 7.5* 7.4* 7.3*       Recent Labs     12/30/22  0245 12/30/22  0508 12/30/22  0510 12/31/22  0108 12/31/22  0551 12/31/22  1100   ALTSGPT 172* 164*  --   --  117*  --    ASTSGOT 87* 77*  --   --  35  --    ALKPHOSPHAT 145* 147*  --   --  170*  --    TBILIRUBIN 2.5* 2.2*  --   --  0.9  --    GLUCOSE 128* 168*   < > 205* 134* 178*    < > = values in this interval not displayed.       Recent Labs     12/30/22 2018 12/31/22  0108 12/31/22  0551 12/31/22  1100 12/31/22  1235   RBC  --  3.12* 3.13*  --  3.14*   HEMOGLOBIN  --  8.9* 8.8*  --  8.8*   HEMATOCRIT  --  25.7* 25.5*  --  25.6*   PLATELETCT  --  31* 21*  --  54*   PROTHROMBTM  --  16.2* 15.4* 15.3*  --    APTT 33.4 34.7  --  34.1  --    INR  --  1.32* 1.24* 1.23*  --        Recent Labs     12/30/22 0245 12/30/22  0508 12/30/22  0510 12/31/22  0108 12/31/22  0551 12/31/22  1235   WBC 0.1*  --    < > 0.1* 0.2* 0.2*   ASTSGOT 87* 77*  --   --  35  --    ALTSGPT 172* 164*  --   --  117*  --    ALKPHOSPHAT 145* 147*  --   --  170*  --    TBILIRUBIN 2.5* 2.2*  --   --  0.9  --     < > = values in this interval not displayed.         Meds:   potassium phosphate  30 mmol 83.3 mL/hr at 12/31/22 1254    pantoprazole  40 mg      potassium chloride in water  20 mEq 20 mEq (12/31/22 1240)    Followed by    potassium chloride  10 mEq      furosemide  20 mg      K+ Scale: Goal of 5  1 Each      hydrocortisone sodium succinate PF  50 mg      cefepime  2 g Stopped (12/31/22 0627)    ondansetron  4 mg      HYDROmorphone  0.4 mg      Or    HYDROmorphone  0.6 mg      insulin regular  1-6 Units      And    dextrose bolus  25 g 25 g (12/28/22 0316)        Procedures:  N/A    Imaging:  DX-CHEST-PORTABLE (1 VIEW)   Final Result      Stable bilateral pulmonary infiltrates.      DX-CHEST-PORTABLE (1 VIEW)   Final Result      Worsening bilateral pulmonary infiltrates.      DX-CHEST-PORTABLE (1 VIEW)   Final Result      1.  Stable patchy  bilateral interstitial opacities which could be seen in setting of edema and/or infection.   2.  Stable mild enlargement of the cardiomediastinal silhouette.      DX-CHEST-PORTABLE (1 VIEW)   Final Result      1.  Supportive tubing as described above.   2.  No other significant change from prior exam.         DX-CHEST-PORTABLE (1 VIEW)   Final Result      No significant change from prior exam.      DX-CHEST-PORTABLE (1 VIEW)   Final Result      Worsening hypoinflation and bilateral perihilar infiltrates as well as prominence of the pulmonary vasculature suggesting pulmonary edema.  Superimposed pneumonia is not excluded.      EC-ECHOCARDIOGRAM LTD W/O CONT   Final Result      US-EXTREMITY VENOUS LOWER BILAT   Final Result      CT-ABDOMEN-PELVIS WITH   Final Result      1.  Hepatic steatosis.   2.  No acute inflammatory abnormality within the abdomen or pelvis.      CT-MAXILLOFACIAL W/O PLUS RECONS   Final Result      1.  Essentially negative maxillofacial sinus CT. Small retention cyst in the alveolar recess of the right maxillary sinus of no clinical significance.      CT-HEAD W/O   Final Result      Head CT without contrast within normal limits. No evidence of acute cerebral infarction, hemorrhage or mass lesion.         DX-CHEST-PORTABLE (1 VIEW)   Final Result      No evidence of acute cardiopulmonary process.          ASSESSEMENT and PLAN:    * Shock (HCC)- (present on admission)  Assessment & Plan  -Hemorrhagic shock from DIC  -Hemorrhagic gastritis evident  -NG tube placement for suction  -Every 4 hours CBC, transfuse for hemoglobin less than 7, transfuse for platelets less than 50 in the setting of active upper GI bleeding, and for platelets less than 30 if bleeding has tamponaded and ceased  -Every 4 hour PT/PTT/INR, transfuse FFP for INR greater than 2 if ongoing active bleeding is evident  -Continue to trend Fibrinogen  -If evidence of developing volume overload, would begin albumin and IV Lasix   -GI has  been consulted, there is no benefit from an endoscopic interventions and hemorrhagic gastritis from DIC  -IV Protonix drip  -Off of vasopressor support   -Continue stress dose steroids    Acute respiratory failure with hypoxia (HCC)  Assessment & Plan  CXR with cardiomegaly and diffuse infiltrates in setting of large volumes of blood product and IVF resuscitation  UOP good with Lasix  -Continue with Lasix 20mg IV BID  -Strict I/O's  -Wean supplemental O2 as tolerated  -Daily CXR AM    Gram-negative bacteremia  Assessment & Plan  E coli and Klebsiella bacteremia sensitive to Cefepime  ID following  -Repeat Blood cultures on 12/30  -Continue Cefepime  -Continue to monitor neutropenia, GSCF per heme/onc    Elevated troponin  Assessment & Plan  Likely demand ischemia from severe shock  No ischemic changes noted on EKG  Downtrended, no need to continue to monitor    Hypocalcemia  Assessment & Plan  Resolved  -Monitor carefully, as patient is getting a lot of blood products which chelate and worsen hypocalcemia    Hyperkalemia  Assessment & Plan  Noted in the setting of DIC  Resolved, now hypokalemic    Upper GI bleed  Assessment & Plan  -Upon admission patient noted to vomit almost a liter of blood  -Opinion is that this is likely from hemorrhagic gastritis likely in setting of recent high dose steroids with chemotherapy  -Also notable that patient was recently on steroids for 2 weeks, but was taking prophylactic H2 blockers  -Per GI opinion, no benefit in endoscopy until more clinically stable  -Treat DIC and hemorrhagic shock with supportive care  -IV Protonix now off drip, BID    Neutropenic sepsis (HCC)  Assessment & Plan  This is Severe Sepsis Present on admission  SIRS criteria identified on my evaluation include: Tachycardia, with heart rate greater than 90 BPM, Tachypnea, with respirations greater than 20 per minute and Leukopenia, with WBC less than 4,000  Clinical indicators of end organ dysfunction include  "Systolic blood pressure (SBP) <90 mmHg or mean arterial pressure <65 mmHg  Source is unknown  Sepsis protocol initiated  Crystalloid Fluid Administration: Fluid resuscitation ordered per standard protocol - 30 mL/kg per current or ideal body weight  IV antibiotics as appropriate for source of sepsis  Reassessment: I have reassessed the patient's hemodynamic status  Blood cultures with E coli and Klebsiella pneumoniae  MRSA nares negative  CT head, maxillofacial, abdomen/pelvis without any source of infection  Urinalysis also does not appear infected  -ID following, continue Cefepime  -Repeat blood cultures on 12/30  -Resume pneumocystis ppx Monday JAN 02 per ID recs  -Continue stress dose steroids  -Weaned off of vasopressor support    Hyperglycemia  Assessment & Plan  Recent steroids for the last 2 weeks  Currently on stress dose steroids  -LDSSI plus hypoglycemia protocol      Lactic acid acidosis  Assessment & Plan  Resolved    High anion gap metabolic acidosis  Assessment & Plan  In the setting of severe hypovolemia in the setting of hemorrhagic shock and acute kidney injury  Resolved    Acute kidney injury (HCC)  Assessment & Plan  Mostly likely secondary to hemorrhagic shock  Resolved with restoration of hemodynamics and ongoing blood product support  Strict I/O's  Resolved  Continue to monitor    At high risk for venous thromboembolism (VTE)  Assessment & Plan  -Hold home apixaban in setting of active bleeding and DIC    DIC (disseminated intravascular coagulation) (HCC)  Assessment & Plan  Fibrinogen <60 on admission trending up to 690s, no evidence of hemolysis on labs  Mild T bili elevation, LDH mild elevation to 290s, Haptoglobin normal  Pancytopenia in all cell lines and PTT/INR/PT all elevated also consistent with DIC  LE U/S negative for DVT  TTE EF 65% no wall motion abnormality  -Supportive care with transfusions of PRBC/platelets/FFP as detailed in \"Shock\"  -Heme/Onc followign  -Every 8 hours " fibrinogen, every 6 hours coags and CBC  -Hold home Eliquis  -Pain control with IV narcotics prn ordered    Acute lymphoblastic leukemia (ALL) (HCC)  Assessment & Plan  Last chemotherapy treatment on 12/20/2022, where patient received vincristine, doxorubicin, Dexrazoxane, and PEG-aspargase  He has been on apixaban for the last 2 weeks due to risk for PEG-aspargase related thrombotic events  -Hold apixaban in setting of hemorrhagic shock from DIC  -He was also on steroids which could have exacerbated his hemorrhagic gastritis  -IV Protonix drip  -Heme/Onc following  -Generalized weakness worsening, likely hospital de-conditioning, appreciate PT/OT recs    Hypokalemia  Assessment & Plan  K scale ordered  Serial BMP monitoring  Replete as per protocol        DISPO: Continue ICU hospitalization    CODE STATUS: Full Code    Quality Measures:  Feeding: Tube feeds  Analgesia: N/A  Sedation: N/A  Thromboprophylaxis: SCD's, chemical VTE PPX contraindicated in active bleeding  Head of bed: >30 degrees  Ulcer prophylaxis: Pantoprazole infusion  Glycemic control: Correctional: LDSSI / Basal: N/A  Bowel care: bowel regimen  Indwelling lines: R chest port, L AC PIV, R AC PIV, L FA PIV, Condom catheter  Deescalate of antibiotic: Cefepime, ID following      Milo Garcia M.D.

## 2022-12-31 NOTE — PROGRESS NOTES
Monitor summary:  Sinus rhythm to sinu tachycardia with rates from 80s to 130s  .12/.08/.28

## 2022-12-31 NOTE — CARE PLAN
The patient is Watcher - Medium risk of patient condition declining or worsening    Shift Goals  Clinical Goals: monitor bleeding/labs  Patient Goals: comfort, rest  Family Goals: LELA    Progress made toward(s) clinical / shift goals:    Problem: Pain - Standard  Goal: Alleviation of pain or a reduction in pain to the patient’s comfort goal  Outcome: Progressing  Note: Pt's pain being managed with IV analgesics     Problem: Skin Integrity  Goal: Skin integrity is maintained or improved  Outcome: Progressing  Note: Pt's skin remains intact. Q2 hr turns. Skin intact where devices are located.     Problem: Fall Risk  Goal: Patient will remain free from falls  Outcome: Progressing  Note: Call light and bedside table within reach. Bed in lowest position. Bed alarm on. Nonskid socks on.       Patient is not progressing towards the following goals:

## 2022-12-31 NOTE — CARE PLAN
Problem: Knowledge Deficit - Standard  Goal: Patient and family/care givers will demonstrate understanding of plan of care, disease process/condition, diagnostic tests and medications  Outcome: Progressing     Problem: Pain - Standard  Goal: Alleviation of pain or a reduction in pain to the patient’s comfort goal  Outcome: Progressing     Problem: Skin Integrity  Goal: Skin integrity is maintained or improved  Outcome: Progressing     Problem: Fall Risk  Goal: Patient will remain free from falls  Outcome: Progressing   The patient is Unstable - High likelihood or risk of patient condition declining or worsening    Shift Goals  Clinical Goals: stable hemodynamics  Patient Goals: drink water, d/c NG tube  Family Goals: improvement of labs, hemodynamic stabilty    Progress made toward(s) clinical / shift goals:  hemodynamically stable     Patient is not progressing towards the following goals:

## 2022-12-31 NOTE — PROGRESS NOTES
Infectious Disease Progress Note    Author: Nova Louis M.D. 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 Found also to be in septic shock:blood cultures Ecoli and Kleb   AF (99.9) WBC 0.1 ANC 0 tolerating abx remains weak and edematous   AF (99.4) WBC 0.1 potassium 2.9 trop 155 platelets 9 sleeping soundly at time of rounds   AF WBC 0.2 alert today-feeling a little better-no further bleeding No pressors  Labs Reviewed, Medications Reviewed, and Radiology Reviewed.    Review of Systems:  Review of Systems   Unable to perform ROS: Other   Constitutional:  Negative for fever.   Respiratory:  Negative for cough, hemoptysis and shortness of breath.    Cardiovascular:  Positive for leg swelling.        Decreased   Gastrointestinal:  Negative for abdominal pain, diarrhea, nausea and vomiting.   All other systems reviewed and are negative.    Hemodynamics:  Temp (24hrs), Av °C (98.6 °F), Min:36.3 °C (97.4 °F), Max:37.6 °C (99.7 °F)  Temperature: 37.6 °C (99.7 °F)  Pulse  Av.6  Min: 67  Max: 179   Blood Pressure: (!) 148/86       Physical Exam:  Physical Exam  Vitals and nursing note reviewed.   Constitutional:       General: He is not in acute distress.     Appearance: He is ill-appearing. He is not toxic-appearing or diaphoretic.   HENT:      Nose: No rhinorrhea.      Mouth/Throat:      Pharynx: No oropharyngeal exudate or posterior oropharyngeal erythema.   Eyes:      General: No scleral icterus.     Extraocular Movements: Extraocular movements intact.      Pupils: Pupils are equal, round, and reactive to light.   Cardiovascular:      Rate and Rhythm: Tachycardia present.   Pulmonary:      Effort: Pulmonary effort is normal. No respiratory distress.      Breath sounds: No stridor.   Abdominal:      General: There is no  distension.      Palpations: Abdomen is soft.      Tenderness: There is no abdominal tenderness.   Musculoskeletal:         General: Swelling present.      Cervical back: Neck supple. No rigidity.   Skin:     Coloration: Skin is not jaundiced.      Findings: Bruising present.   Neurological:      General: No focal deficit present.      Mental Status: He is alert and oriented to person, place, and time.   Psychiatric:         Mood and Affect: Mood normal.         Behavior: Behavior normal.       Meds:    Current Facility-Administered Medications:     potassium phosphate    pantoprazole    potassium chloride in water **FOLLOWED BY** potassium chloride    furosemide    K+ Scale: Goal of 5    hydrocortisone sodium succinate PF    cefepime    ondansetron    HYDROmorphone **OR** HYDROmorphone    insulin regular **AND** POC blood glucose manual result **AND** NOTIFY MD and PharmD **AND** Administer 20 grams of glucose (approximately 8 ounces of fruit juice) every 15 minutes PRN FSBG less than 70 mg/dL **AND** dextrose bolus    Labs:  Recent Labs     12/30/22  1111 12/30/22  1632 12/31/22  0108 12/31/22  0551   WBC 0.1* 0.1* 0.1* 0.2*   RBC 2.72* 3.54* 3.12* 3.13*   HEMOGLOBIN 7.9* 10.1* 8.9* 8.8*   HEMATOCRIT 22.5* 28.8* 25.7* 25.5*   MCV 82.7 81.4 82.4 81.5   MCH 29.0 28.5 28.5 28.1   RDW 49.1 48.4 49.9 49.8   PLATELETCT 38* 24* 31* 21*   MPV 11.5  --  10.3 9.9       Recent Labs     12/31/22  0108 12/31/22  0551 12/31/22  1100   SODIUM 137 138 135   POTASSIUM 3.5* 3.8 3.5*   CHLORIDE 101 103 99   CO2 32 32 32   GLUCOSE 205* 134* 178*   BUN 11 11 11       Recent Labs     12/30/22  0245 12/30/22  0508 12/30/22  0510 12/31/22  0108 12/31/22  0551 12/31/22  1100   ALBUMIN 2.2* 2.0*  --   --  1.8*  --    TBILIRUBIN 2.5* 2.2*  --   --  0.9  --    ALKPHOSPHAT 145* 147*  --   --  170*  --    TOTPROTEIN 4.5* 4.3*  --   --  4.4*  --    ALTSGPT 172* 164*  --   --  117*  --    ASTSGOT 87* 77*  --   --  35  --    CREATININE 0.40*  0.39*   < > 0.37* 0.26* 0.29*    < > = values in this interval not displayed.         Imaging:  CT-ABDOMEN-PELVIS WITH    Result Date: 12/27/2022 12/27/2022 1:47 PM HISTORY/REASON FOR EXAM:  Sepsis. Nausea and vomiting. Abdominal pain chemotherapy for cancer. TECHNIQUE/EXAM DESCRIPTION:   CT scan of the abdomen and pelvis with contrast. Contrast-enhanced helical scanning was obtained from the diaphragmatic domes through the pubic symphysis following the bolus administration of nonionic contrast without complication. 100 mL of Omnipaque 350 nonionic contrast was administered without complication. Low dose optimization technique was utilized for this CT exam including automated exposure control and adjustment of the mA and/or kV according to patient size. COMPARISON: No prior studies available. FINDINGS: Lower Chest: Unremarkable. Liver: Hepatic fatty infiltration. Spleen: Unremarkable. Pancreas: Unremarkable. Gallbladder: No calcified stones. Biliary: Nondilated. Adrenal glands: Normal. Kidneys: Unremarkable without hydronephrosis. Bowel: No obstruction or acute inflammation. Normal appendix. Lymph nodes: No adenopathy. Vasculature: Unremarkable. Peritoneum: Unremarkable without ascites. Musculoskeletal: No acute or destructive process. Pelvis: No adenopathy or free fluid.     1.  Hepatic steatosis. 2.  No acute inflammatory abnormality within the abdomen or pelvis.    CT-HEAD W/O    Result Date: 12/27/2022 12/27/2022 1:42 PM HISTORY/REASON FOR EXAM:  Mental status change, unknown cause. Chemotherapy TECHNIQUE/EXAM DESCRIPTION AND NUMBER OF VIEWS: CT of the head without contrast. The study was performed on a helical multidetector CT scanner. Contiguous axial sections were obtained from the skull base through the vertex. Up to date radiation dose reduction adjustments have been utilized to meet ALARA standards for radiation dose reduction. COMPARISON:  None available FINDINGS: The calvariae and skull base are  unremarkable. There are no extraaxial fluid collections. The ventricular system and basal cisterns are within normal limits. There are no areas of abnormal density in the brain substance. There are no hemorrhagic lesions.  There are no mass effects or shift of midline structures. The brainstem and posterior fossa structures are unremarkable. Paranasal sinuses in the field of view are unremarkable. Mastoids in the field of view are unremarkable.     Head CT without contrast within normal limits. No evidence of acute cerebral infarction, hemorrhage or mass lesion.     CT-MAXILLOFACIAL W/O PLUS RECONS    Result Date: 12/27/2022 12/27/2022 1:42 PM HISTORY/REASON FOR EXAM:  Maxillofacial pain. Blood in the mouth. TECHNIQUE/EXAMD DESCRIPTION AND NUMBER OF VIEWS: CT scan maxillofacial without contrast, with reconstructions. Thin-section helical imaging was obtained of the maxillofacial structures from the orbital roofs through the mandible. Coronal and sagittal multiplanar volume reformat images were generated from the axial data. Low dose optimization technique was utilized for this CT exam including automated exposure control and adjustment of the mA and/or kV according to patient size. COMPARISON: None. FINDINGS: The maxillofacial and orbital bony structures are unremarkable. The paranasal sinuses are normally developed. There is a small retention cyst at the alveolar recess of the right maxillary sinus. There are no air-fluid levels. The mastoids and middle ear cavities are clear. There is no significant cervical adenopathy in the field-of-view.     1.  Essentially negative maxillofacial sinus CT. Small retention cyst in the alveolar recess of the right maxillary sinus of no clinical significance.    DX-CHEST-PORTABLE (1 VIEW)    Result Date: 12/29/2022 12/29/2022 1:08 AM HISTORY/REASON FOR EXAM:  Shortness of Breath. TECHNIQUE/EXAM DESCRIPTION AND NUMBER OF VIEWS: Single portable view of the chest. COMPARISON:  12/28/2022 1:53 AM FINDINGS: Cardiomediastinal contour is unchanged. Previously described pulmonary parenchymal opacities persist. No pneumothorax identified. Orogastric tube has been removed. RIGHT chest infusion port remains.     1.  Supportive tubing as described above. 2.  No other significant change from prior exam.     DX-CHEST-PORTABLE (1 VIEW)    Result Date: 12/28/2022 12/28/2022 2:11 AM HISTORY/REASON FOR EXAM:  Shortness of Breath. TECHNIQUE/EXAM DESCRIPTION AND NUMBER OF VIEWS: Single portable view of the chest. COMPARISON: 12/27/2022 FINDINGS: Cardiomediastinal contour is unchanged. Previously described pulmonary parenchymal opacities persist. No pneumothorax identified. Supportive tubing is unchanged.     No significant change from prior exam.    DX-CHEST-PORTABLE (1 VIEW)    Result Date: 12/28/2022 12/27/2022 11:46 PM HISTORY/REASON FOR EXAM:  Shortness of Breath. TECHNIQUE/EXAM DESCRIPTION AND NUMBER OF VIEWS: Single portable view of the chest. COMPARISON: 12/27/2022 FINDINGS: Cardiac mediastinal contour is unchanged. Lungs show hypoinflation. Ill-defined bilateral perihilar opacities, new from prior exam. Pulmonary vessels are prominent. No pleural fluid collection or pneumothorax. Orogastric tube tip at the proximal stomach. RIGHT chest infusion port present.     Worsening hypoinflation and bilateral perihilar infiltrates as well as prominence of the pulmonary vasculature suggesting pulmonary edema.  Superimposed pneumonia is not excluded.    DX-CHEST-PORTABLE (1 VIEW)    Result Date: 12/27/2022 12/27/2022 2:09 PM HISTORY/REASON FOR EXAM: Chest pain TECHNIQUE/EXAM DESCRIPTION AND NUMBER OF VIEWS: Single portable view of the chest. COMPARISON: 8/29/2022 FINDINGS: There is a right subclavian Port-A-Cath. There is no evidence of focal consolidation or evidence of pulmonary edema. There is no pleural effusion. The heart is normal in size.     No evidence of acute cardiopulmonary  process.    US-EXTREMITY VENOUS LOWER BILAT    Result Date: 2022   Vascular Laboratory  CONCLUSIONS  No deep venous thrombosis.  Distal subcutaneous fat edema.  REBECA CHEEMA  Exam Date:     2022 17:13  Room #:     Inpatient  Priority:     Stat  Ht (in):             Wt (lb):  Ordering Physician:        SANDI IGLESIAS  Referring Physician:       [id number]  Sonographer:               Palma Churchill RVVIOLA  Study Type:                Complete Bilateral  Technical Quality:         Adequate  Age:    22    Gender:     M  MRN:    [id number]  :    2000      BSA:  Indications:     Localized swelling, mass and lump, lower limb, bilateral  CPT Codes:       96017  ICD Codes:       R22.43  History:         Swelling/edema of legs, concern for pulmonary embolus. Pain.                     No prior duplex.  Limitations:  PROCEDURES:  Bilateral lower extremity venous duplex imaging.  The following venous structures were evaluated: common femoral, deep  femoral, proximal great saphenous, femoral, popliteal, peroneal, and  posterior tibial veins.  Serial compression, color, and spectral Doppler flow evaluations were  performed.  FINDINGS:  Bilateral lower extremities.  No deep venous thrombosis.  All veins demonstrate complete color filling and compressibility with  normal venous flow dynamics including spontaneous flow and respiratory  phasicity.  Interstitial fluid consistent with edema is observed below the knee.  Dorian Knowles  (Electronically Signed)  Final Date:      2022                   20:46    EC-ECHOCARDIOGRAM LTD W/O CONT    Result Date: 2022  Transthoracic Echo Report Echocardiography Laboratory CONCLUSIONS The left ventricular ejection fraction is visually estimated to be 65%. Normal regional wall motion. REBECA CHEEMA Exam Date:         2022                    17:50 Exam Location:     Inpatient Priority:          Routine Ordering Physician:         SANDI IGLESIAS Referring Physician: Sonographer:               Tigist INTERIANO Age:    22     Gender:    M MRN:    [id number] :    2000 BSA:    1.82   Ht (in):    65     Wt (lb):    165 Exam Type:     Limited Indications:     Shortness of breath ICD Codes:       786.05 CPT Codes:       11995 BP:   128    /   57     HR:   160 Technical Quality:       Technically difficult study                          incomplete information is                          obtained MEASUREMENTS  (Male / Female) Normal Values 2D ECHO Estimated LV Ejection Fraction    65 %                  * Indicates values subject to auto-interpretation LV EF:  65    % FINDINGS Left Ventricle Normal left ventricular chamber size. Normal left ventricular systolic function. The left ventricular ejection fraction is visually estimated to be 65%. Normal regional wall motion. Diastolic function is difficult to assess with arrhythmia. Right Ventricle The right ventricle is not well visualized. Right Atrium The right atrium is not well visualized. Left Atrium The left atrium is not well visualized. Mitral Valve The mitral valve is not well visualized. Aortic Valve The aortic valve is not well visualized. Tricuspid Valve The tricuspid valve is not well visualized. Pulmonic Valve The pulmonic valve is not well visualized. Pericardium Normal pericardium without effusion. Aorta The ascending aorta is not well visualized. Mayito Howell MD (Electronically Signed) Final Date:     2022                 18:38      Micro:  Results       Procedure Component Value Units Date/Time    BLOOD CULTURE [[id number]] Collected: 22 1015    Order Status: Completed Specimen: Blood from Peripheral Updated: 22 0732     Significant Indicator NEG     Source BLD     Site PERIPHERAL     Culture Result No Growth  Note: Blood cultures are incubated for 5 days and  are monitored continuously.Positive blood cultures  are called to the RN and reported as  "soon as  they are identified.      Narrative:      Per Hospital Policy: Only change Specimen Src: to \"Line\" if  specified by physician order.  Right Forearm/Arm    BLOOD CULTURE [[id number]] Collected: 12/30/22 1045    Order Status: Completed Specimen: Blood from Peripheral Updated: 12/31/22 0732     Significant Indicator NEG     Source BLD     Site PERIPHERAL     Culture Result No Growth  Note: Blood cultures are incubated for 5 days and  are monitored continuously.Positive blood cultures  are called to the RN and reported as soon as  they are identified.      Narrative:      Per Hospital Policy: Only change Specimen Src: to \"Line\" if  specified by physician order.  Right Forearm/Arm    BLOOD CULTURE [[id number]]  (Abnormal) Collected: 12/27/22 1410    Order Status: Completed Specimen: Blood from Peripheral Updated: 12/29/22 0806     Significant Indicator POS     Source BLD     Site PERIPHERAL     Culture Result Growth detected by Bactec instrument. 12/27/2022  21:19      Escherichia coli  See previous culture for sensitivity report.        Klebsiella pneumoniae  See previous culture for sensitivity report.      Narrative:      CALL  Jenkins  161 tel. [phone],  CALLED  161 tel. [phone] 12/27/2022, 21:25, RB PERF. RESULTS CALLED TO: RN  [id number]  Per Hospital Policy: Only change Specimen Src: to \"Line\" if  specified by physician order.  Left AC    BLOOD CULTURE [[id number]]  (Abnormal)  (Susceptibility) Collected: 12/27/22 1345    Order Status: Completed Specimen: Blood from Peripheral Updated: 12/29/22 0806     Significant Indicator POS     Source BLD     Site PERIPHERAL     Culture Result Growth detected by Bactec instrument. 12/27/2022  21:20      Escherichia coli      Klebsiella pneumoniae    Narrative:      CALL  Jenkins  161 tel. [phone],  CALLED  161 tel. [phone] 12/27/2022, 21:24, RB PERF. RESULTS CALLED TO: RN  [phone]  Per Hospital Policy: Only change Specimen Src: to \"Line\" if  specified by physician " order.  No site indicated    Susceptibility       Escherichia coli (1)       Antibiotic Interpretation Microscan   Method Status    Ampicillin Sensitive <=8 mcg/mL COLTEN Final    Ceftriaxone Sensitive <=1 mcg/mL COLTEN Final    Cefazolin Sensitive <=2 mcg/mL COLTEN Final    Ciprofloxacin Sensitive <=0.25 mcg/mL COLTEN Final    Cefepime Sensitive <=2 mcg/mL COLTEN Final    Cefuroxime Sensitive <=4 mcg/mL COLTEN Final    Ampicillin/sulbactam Sensitive <=4/2 mcg/mL COLTEN Final    Ertapenem Sensitive <=0.5 mcg/mL COLTEN Final    Tobramycin Intermediate 8 mcg/mL COLTEN Final    Gentamicin Sensitive <=2 mcg/mL COLTEN Final    Minocycline Sensitive <=4 mcg/mL COLTEN Final    Moxifloxacin Sensitive <=2 mcg/mL COLTEN Final    Pip/Tazobactam Sensitive <=8 mcg/mL COLTEN Final    Trimeth/Sulfa Resistant >2/38 mcg/mL COLTEN Final    Tigecycline Sensitive <=2 mcg/mL COLTEN Final              Klebsiella pneumoniae (2)       Antibiotic Interpretation Microscan   Method Status    Ceftriaxone Sensitive <=1 mcg/mL COLTEN Final    Cefazolin Sensitive <=2 mcg/mL COLTEN Final    Ciprofloxacin Sensitive <=0.25 mcg/mL COLTEN Final    Cefepime Sensitive <=2 mcg/mL COLTEN Final    Cefuroxime Sensitive <=4 mcg/mL COLTEN Final    Ampicillin/sulbactam Sensitive <=4/2 mcg/mL COLTEN Final    Ertapenem Sensitive <=0.5 mcg/mL COLTEN Final    Tobramycin Intermediate 8 mcg/mL COLTEN Final    Gentamicin Sensitive <=2 mcg/mL COLTEN Final    Minocycline Intermediate 8 mcg/mL COLTEN Final    Moxifloxacin Sensitive <=2 mcg/mL COLTEN Final    Pip/Tazobactam Sensitive <=8 mcg/mL COLTEN Final    Trimeth/Sulfa Resistant >2/38 mcg/mL COLTEN Final    Tigecycline Sensitive <=2 mcg/mL COLTEN Final                       URINE CULTURE(NEW) [[id number]] Collected: 12/27/22 1512    Order Status: Completed Specimen: Urine Updated: 12/29/22 0730     Significant Indicator NEG     Source UR     Site -     Culture Result No growth at 48 hours.    Narrative:      Indication for culture:->Emergency Room Patient  Indication for culture:->Emergency Room  Patient    S. Aureus By PCR, Nasal Complete [[id number]] Collected: 12/27/22 2105    Order Status: Completed Specimen: Respirate from Respiratory Updated: 12/28/22 0109     Staph aureus by PCR Negative     MRSA by PCR Negative    Narrative:      Collected By: [id number] JASON DELATORRE    CoV-2, FLU A/B, and RSV by PCR (2-4 Hours CEPHEID) : Collect NP swab in VTM [[id number]] Collected: 12/27/22 1500    Order Status: Completed Specimen: Respirate Updated: 12/27/22 1639     Influenza virus A RNA Negative     Influenza virus B, PCR Negative     RSV, PCR Negative     SARS-CoV-2 by PCR NotDetected     Comment: [facility] providers: PLEASE REFER TO DE-ESCALATION AND RETESTING PROTOCOL  on [email or web address]    **The [employer] GeneXpert Xpress SARS-CoV-2 RT-PCR Test has been made  available for use under the Emergency Use Authorization (EUA) only.          SARS-CoV-2 Source NP Swab    URINALYSIS [[id number]]  (Abnormal) Collected: 12/27/22 1512    Order Status: Completed Specimen: Urine Updated: 12/27/22 1544     Color Yellow     Character Clear     Specific Gravity >=1.045     Ph 5.0     Glucose Negative mg/dL      Ketones Negative mg/dL      Protein Negative mg/dL      Bilirubin Negative     Urobilinogen, Urine 0.2     Nitrite Negative     Leukocyte Esterase Negative     Occult Blood Trace     Micro Urine Req Microscopic    Narrative:      Indication for culture:->Emergency Room Patient            Assessment:  Active Hospital Problems    Diagnosis     *Shock (HCC) [R57.9]     Gram-negative bacteremia [R78.81]     DIC (disseminated intravascular coagulation) (HCC) [D65]     At high risk for venous thromboembolism (VTE) [Z91.89]     Acute kidney injury (HCC) [N17.9]     High anion gap metabolic acidosis [E87.29]     Lactic acid acidosis [E87.20]     Hyperglycemia [R73.9]     Neutropenic sepsis (HCC) [A41.9, D70.9]     Upper GI bleed [K92.2]     Hyperkalemia [E87.5]     Hypocalcemia [E83.51]     Elevated troponin [R77.8]     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   Do not change to ceftriaxone  Repeat blood cultures neg at 24 hours  Transfusions and electrolyte replacement as needed  Will need explantation port once stabilized as likely infected  GCSF per oncology  Plan to resume Bactrim for PJP prophylaxis Mon  High risk for additional bleeding  Holding chemo as long as feasible  Final antibiotic recommendations per culture results, resolution neutropenia, and clinical course    BRIDGET Bernal

## 2023-01-01 ENCOUNTER — HOSPITAL ENCOUNTER (OUTPATIENT)
Dept: RADIOLOGY | Facility: MEDICAL CENTER | Age: 23
End: 2023-01-01
Attending: INTERNAL MEDICINE

## 2023-01-01 LAB
ALBUMIN SERPL BCP-MCNC: 2.1 G/DL (ref 3.2–4.9)
ALBUMIN/GLOB SERPL: 0.8 G/DL
ALP SERPL-CCNC: 172 U/L (ref 30–99)
ALT SERPL-CCNC: 88 U/L (ref 2–50)
ANION GAP SERPL CALC-SCNC: 4 MMOL/L (ref 7–16)
ANION GAP SERPL CALC-SCNC: 5 MMOL/L (ref 7–16)
APTT PPP: 29.1 SEC (ref 24.7–36)
AST SERPL-CCNC: 23 U/L (ref 12–45)
BASE EXCESS BLDV CALC-SCNC: 7 MMOL/L (ref -4–3)
BILIRUB SERPL-MCNC: 0.6 MG/DL (ref 0.1–1.5)
BODY TEMPERATURE: ABNORMAL DEGREES
BUN SERPL-MCNC: 13 MG/DL (ref 8–22)
BUN SERPL-MCNC: 13 MG/DL (ref 8–22)
CA-I SERPL-SCNC: 1 MMOL/L (ref 1.1–1.3)
CALCIUM ALBUM COR SERPL-MCNC: 9.2 MG/DL (ref 8.5–10.5)
CALCIUM SERPL-MCNC: 7.7 MG/DL (ref 8.5–10.5)
CALCIUM SERPL-MCNC: 8 MG/DL (ref 8.5–10.5)
CHLORIDE SERPL-SCNC: 99 MMOL/L (ref 96–112)
CHLORIDE SERPL-SCNC: 99 MMOL/L (ref 96–112)
CO2 BLDV-SCNC: 34 MMOL/L (ref 20–33)
CO2 SERPL-SCNC: 30 MMOL/L (ref 20–33)
CO2 SERPL-SCNC: 30 MMOL/L (ref 20–33)
CREAT SERPL-MCNC: 0.23 MG/DL (ref 0.5–1.4)
CREAT SERPL-MCNC: 0.25 MG/DL (ref 0.5–1.4)
DELSYS IDSYS: ABNORMAL
ERYTHROCYTE [DISTWIDTH] IN BLOOD BY AUTOMATED COUNT: 51 FL (ref 35.9–50)
ERYTHROCYTE [DISTWIDTH] IN BLOOD BY AUTOMATED COUNT: 51.6 FL (ref 35.9–50)
GFR SERPLBLD CREATININE-BSD FMLA CKD-EPI: 183 ML/MIN/1.73 M 2
GFR SERPLBLD CREATININE-BSD FMLA CKD-EPI: 188 ML/MIN/1.73 M 2
GLOBULIN SER CALC-MCNC: 2.5 G/DL (ref 1.9–3.5)
GLUCOSE BLD STRIP.AUTO-MCNC: 114 MG/DL (ref 65–99)
GLUCOSE BLD STRIP.AUTO-MCNC: 160 MG/DL (ref 65–99)
GLUCOSE BLD STRIP.AUTO-MCNC: 166 MG/DL (ref 65–99)
GLUCOSE BLD STRIP.AUTO-MCNC: 170 MG/DL (ref 65–99)
GLUCOSE BLD STRIP.AUTO-MCNC: 188 MG/DL (ref 65–99)
GLUCOSE SERPL-MCNC: 114 MG/DL (ref 65–99)
GLUCOSE SERPL-MCNC: 129 MG/DL (ref 65–99)
HCO3 BLDV-SCNC: 32.2 MMOL/L (ref 24–28)
HCT VFR BLD AUTO: 23.9 % (ref 42–52)
HCT VFR BLD AUTO: 25.6 % (ref 42–52)
HGB BLD-MCNC: 8.1 G/DL (ref 14–18)
HGB BLD-MCNC: 8.8 G/DL (ref 14–18)
INR PPP: 1.1 (ref 0.87–1.13)
LPM ILPM: 3 LPM
MAGNESIUM SERPL-MCNC: 1.9 MG/DL (ref 1.5–2.5)
MCH RBC QN AUTO: 28.1 PG (ref 27–33)
MCH RBC QN AUTO: 28.8 PG (ref 27–33)
MCHC RBC AUTO-ENTMCNC: 33.9 G/DL (ref 33.7–35.3)
MCHC RBC AUTO-ENTMCNC: 34.4 G/DL (ref 33.7–35.3)
MCV RBC AUTO: 83 FL (ref 81.4–97.8)
MCV RBC AUTO: 83.7 FL (ref 81.4–97.8)
NRBC # BLD AUTO: 0.03 K/UL
NRBC BLD-RTO: 7.5 /100 WBC
PCO2 BLDV: 45.8 MMHG (ref 41–51)
PCO2 TEMP ADJ BLDV: 44.5 MMHG (ref 41–51)
PH BLDV: 7.46 [PH] (ref 7.31–7.45)
PH TEMP ADJ BLDV: 7.46 [PH] (ref 7.31–7.45)
PHOSPHATE SERPL-MCNC: 2 MG/DL (ref 2.5–4.5)
PLATELET # BLD AUTO: 30 K/UL (ref 164–446)
PLATELET # BLD AUTO: 52 K/UL (ref 164–446)
PMV BLD AUTO: 10.4 FL (ref 9–12.9)
PMV BLD AUTO: 9.3 FL (ref 9–12.9)
PO2 BLDV: 31 MMHG (ref 25–40)
PO2 TEMP ADJ BLDV: 29 MMHG (ref 25–40)
POTASSIUM SERPL-SCNC: 3.7 MMOL/L (ref 3.6–5.5)
POTASSIUM SERPL-SCNC: 3.8 MMOL/L (ref 3.6–5.5)
PROT SERPL-MCNC: 4.6 G/DL (ref 6–8.2)
PROTHROMBIN TIME: 14.1 SEC (ref 12–14.6)
RBC # BLD AUTO: 2.88 M/UL (ref 4.7–6.1)
RBC # BLD AUTO: 3.06 M/UL (ref 4.7–6.1)
SAO2 % BLDV: 62 %
SODIUM SERPL-SCNC: 133 MMOL/L (ref 135–145)
SODIUM SERPL-SCNC: 134 MMOL/L (ref 135–145)
SPECIMEN DRAWN FROM PATIENT: ABNORMAL
WBC # BLD AUTO: 0.4 K/UL (ref 4.8–10.8)
WBC # BLD AUTO: 0.6 K/UL (ref 4.8–10.8)

## 2023-01-01 PROCEDURE — 700111 HCHG RX REV CODE 636 W/ 250 OVERRIDE (IP): Performed by: INTERNAL MEDICINE

## 2023-01-01 PROCEDURE — 700101 HCHG RX REV CODE 250: Performed by: STUDENT IN AN ORGANIZED HEALTH CARE EDUCATION/TRAINING PROGRAM

## 2023-01-01 PROCEDURE — 85025 COMPLETE CBC W/AUTO DIFF WBC: CPT

## 2023-01-01 PROCEDURE — 71045 X-RAY EXAM CHEST 1 VIEW: CPT

## 2023-01-01 PROCEDURE — 82803 BLOOD GASES ANY COMBINATION: CPT

## 2023-01-01 PROCEDURE — 85730 THROMBOPLASTIN TIME PARTIAL: CPT

## 2023-01-01 PROCEDURE — 700105 HCHG RX REV CODE 258: Performed by: INTERNAL MEDICINE

## 2023-01-01 PROCEDURE — 83735 ASSAY OF MAGNESIUM: CPT

## 2023-01-01 PROCEDURE — A9270 NON-COVERED ITEM OR SERVICE: HCPCS | Performed by: HOSPITALIST

## 2023-01-01 PROCEDURE — 99232 SBSQ HOSP IP/OBS MODERATE 35: CPT | Performed by: PEDIATRICS

## 2023-01-01 PROCEDURE — 82962 GLUCOSE BLOOD TEST: CPT | Mod: 91

## 2023-01-01 PROCEDURE — 82330 ASSAY OF CALCIUM: CPT

## 2023-01-01 PROCEDURE — 700102 HCHG RX REV CODE 250 W/ 637 OVERRIDE(OP): Performed by: HOSPITALIST

## 2023-01-01 PROCEDURE — 80048 BASIC METABOLIC PNL TOTAL CA: CPT

## 2023-01-01 PROCEDURE — 85610 PROTHROMBIN TIME: CPT

## 2023-01-01 PROCEDURE — 770004 HCHG ROOM/CARE - ONCOLOGY PRIVATE *

## 2023-01-01 PROCEDURE — 99291 CRITICAL CARE FIRST HOUR: CPT | Mod: GC | Performed by: INTERNAL MEDICINE

## 2023-01-01 PROCEDURE — C9113 INJ PANTOPRAZOLE SODIUM, VIA: HCPCS | Performed by: INTERNAL MEDICINE

## 2023-01-01 PROCEDURE — 99223 1ST HOSP IP/OBS HIGH 75: CPT | Performed by: HOSPITALIST

## 2023-01-01 PROCEDURE — 80053 COMPREHEN METABOLIC PANEL: CPT

## 2023-01-01 PROCEDURE — 85027 COMPLETE CBC AUTOMATED: CPT

## 2023-01-01 PROCEDURE — 700105 HCHG RX REV CODE 258: Performed by: STUDENT IN AN ORGANIZED HEALTH CARE EDUCATION/TRAINING PROGRAM

## 2023-01-01 PROCEDURE — 700111 HCHG RX REV CODE 636 W/ 250 OVERRIDE (IP): Performed by: STUDENT IN AN ORGANIZED HEALTH CARE EDUCATION/TRAINING PROGRAM

## 2023-01-01 PROCEDURE — 84100 ASSAY OF PHOSPHORUS: CPT

## 2023-01-01 RX ORDER — ACETAMINOPHEN 325 MG/1
650 TABLET ORAL EVERY 4 HOURS PRN
Status: DISCONTINUED | OUTPATIENT
Start: 2023-01-01 | End: 2023-01-14 | Stop reason: HOSPADM

## 2023-01-01 RX ORDER — CALCIUM CARBONATE 500 MG/1
1000 TABLET, CHEWABLE ORAL 2 TIMES DAILY
Status: DISCONTINUED | OUTPATIENT
Start: 2023-01-01 | End: 2023-01-14 | Stop reason: HOSPADM

## 2023-01-01 RX ORDER — POTASSIUM CHLORIDE 7.45 MG/ML
10 INJECTION INTRAVENOUS ONCE
Status: DISCONTINUED | OUTPATIENT
Start: 2023-01-01 | End: 2023-01-01

## 2023-01-01 RX ORDER — POTASSIUM CHLORIDE 14.9 MG/ML
20 INJECTION INTRAVENOUS ONCE
Status: DISCONTINUED | OUTPATIENT
Start: 2023-01-01 | End: 2023-01-01

## 2023-01-01 RX ORDER — POTASSIUM CHLORIDE 7.45 MG/ML
10 INJECTION INTRAVENOUS ONCE
Status: COMPLETED | OUTPATIENT
Start: 2023-01-01 | End: 2023-01-01

## 2023-01-01 RX ORDER — OXYCODONE HYDROCHLORIDE 5 MG/1
5-10 TABLET ORAL EVERY 4 HOURS PRN
Status: DISCONTINUED | OUTPATIENT
Start: 2023-01-01 | End: 2023-01-03

## 2023-01-01 RX ORDER — OMEPRAZOLE 20 MG/1
40 CAPSULE, DELAYED RELEASE ORAL 2 TIMES DAILY
Status: DISCONTINUED | OUTPATIENT
Start: 2023-01-01 | End: 2023-01-14 | Stop reason: HOSPADM

## 2023-01-01 RX ADMIN — CEFEPIME 2 G: 2 INJECTION, POWDER, FOR SOLUTION INTRAVENOUS at 21:11

## 2023-01-01 RX ADMIN — POTASSIUM CHLORIDE 10 MEQ: 7.46 INJECTION, SOLUTION INTRAVENOUS at 00:35

## 2023-01-01 RX ADMIN — INSULIN HUMAN 1 UNITS: 100 INJECTION, SOLUTION PARENTERAL at 17:59

## 2023-01-01 RX ADMIN — FUROSEMIDE 20 MG: 10 INJECTION, SOLUTION INTRAMUSCULAR; INTRAVENOUS at 05:11

## 2023-01-01 RX ADMIN — POTASSIUM CHLORIDE 10 MEQ: 7.46 INJECTION, SOLUTION INTRAVENOUS at 09:54

## 2023-01-01 RX ADMIN — OXYCODONE 5 MG: 5 TABLET ORAL at 17:09

## 2023-01-01 RX ADMIN — HYDROMORPHONE HYDROCHLORIDE 0.6 MG: 1 INJECTION, SOLUTION INTRAMUSCULAR; INTRAVENOUS; SUBCUTANEOUS at 02:12

## 2023-01-01 RX ADMIN — CEFEPIME 2 G: 2 INJECTION, POWDER, FOR SOLUTION INTRAVENOUS at 06:42

## 2023-01-01 RX ADMIN — POTASSIUM CHLORIDE 20 MEQ: 14.9 INJECTION, SOLUTION INTRAVENOUS at 07:21

## 2023-01-01 RX ADMIN — ACETAMINOPHEN 650 MG: 325 TABLET ORAL at 17:08

## 2023-01-01 RX ADMIN — HYDROCORTISONE SODIUM SUCCINATE 50 MG: 100 INJECTION, POWDER, FOR SOLUTION INTRAMUSCULAR; INTRAVENOUS at 00:19

## 2023-01-01 RX ADMIN — HYDROCORTISONE SODIUM SUCCINATE 50 MG: 100 INJECTION, POWDER, FOR SOLUTION INTRAMUSCULAR; INTRAVENOUS at 05:12

## 2023-01-01 RX ADMIN — HYDROMORPHONE HYDROCHLORIDE 0.6 MG: 1 INJECTION, SOLUTION INTRAMUSCULAR; INTRAVENOUS; SUBCUTANEOUS at 15:20

## 2023-01-01 RX ADMIN — HYDROMORPHONE HYDROCHLORIDE 0.6 MG: 1 INJECTION, SOLUTION INTRAMUSCULAR; INTRAVENOUS; SUBCUTANEOUS at 18:37

## 2023-01-01 RX ADMIN — CEFEPIME 2 G: 2 INJECTION, POWDER, FOR SOLUTION INTRAVENOUS at 15:10

## 2023-01-01 RX ADMIN — HYDROMORPHONE HYDROCHLORIDE 0.6 MG: 1 INJECTION, SOLUTION INTRAMUSCULAR; INTRAVENOUS; SUBCUTANEOUS at 23:31

## 2023-01-01 RX ADMIN — HYDROMORPHONE HYDROCHLORIDE 0.6 MG: 1 INJECTION, SOLUTION INTRAMUSCULAR; INTRAVENOUS; SUBCUTANEOUS at 21:04

## 2023-01-01 RX ADMIN — CALCIUM CARBONATE 1000 MG: 500 TABLET, CHEWABLE ORAL at 17:09

## 2023-01-01 RX ADMIN — OXYCODONE 10 MG: 5 TABLET ORAL at 22:16

## 2023-01-01 RX ADMIN — INSULIN HUMAN 1 UNITS: 100 INJECTION, SOLUTION PARENTERAL at 00:26

## 2023-01-01 RX ADMIN — PANTOPRAZOLE SODIUM 40 MG: 40 INJECTION, POWDER, FOR SOLUTION INTRAVENOUS at 05:12

## 2023-01-01 RX ADMIN — POTASSIUM PHOSPHATE, MONOBASIC AND POTASSIUM PHOSPHATE, DIBASIC 30 MMOL: 224; 236 INJECTION, SOLUTION, CONCENTRATE INTRAVENOUS at 08:11

## 2023-01-01 RX ADMIN — HYDROMORPHONE HYDROCHLORIDE 0.6 MG: 1 INJECTION, SOLUTION INTRAMUSCULAR; INTRAVENOUS; SUBCUTANEOUS at 05:12

## 2023-01-01 RX ADMIN — OMEPRAZOLE 40 MG: 20 CAPSULE, DELAYED RELEASE ORAL at 18:00

## 2023-01-01 RX ADMIN — POTASSIUM PHOSPHATE, MONOBASIC AND POTASSIUM PHOSPHATE, DIBASIC 15 MMOL: 224; 236 INJECTION, SOLUTION, CONCENTRATE INTRAVENOUS at 15:08

## 2023-01-01 RX ADMIN — HYDROMORPHONE HYDROCHLORIDE 0.6 MG: 1 INJECTION, SOLUTION INTRAMUSCULAR; INTRAVENOUS; SUBCUTANEOUS at 08:29

## 2023-01-01 RX ADMIN — INSULIN HUMAN 1 UNITS: 100 INJECTION, SOLUTION PARENTERAL at 12:18

## 2023-01-01 ASSESSMENT — PAIN DESCRIPTION - PAIN TYPE
TYPE: ACUTE PAIN

## 2023-01-01 ASSESSMENT — ENCOUNTER SYMPTOMS
BACK PAIN: 0
DOUBLE VISION: 0
CONSTIPATION: 0
NAUSEA: 0
PALPITATIONS: 0
VOMITING: 0
ABDOMINAL PAIN: 0
DIAPHORESIS: 0
COUGH: 0
BLOOD IN STOOL: 1
SPUTUM PRODUCTION: 0
SORE THROAT: 0
WEAKNESS: 1
HEADACHES: 0
DIZZINESS: 0
DIARRHEA: 0
BLURRED VISION: 0
CHILLS: 0
LOSS OF CONSCIOUSNESS: 0
SHORTNESS OF BREATH: 0
SHORTNESS OF BREATH: 1
HEMOPTYSIS: 0
FEVER: 0

## 2023-01-01 ASSESSMENT — FIBROSIS 4 INDEX: FIB4 SCORE: 1.72

## 2023-01-01 NOTE — PROGRESS NOTES
Pediatric Hematology/Oncology  Daily Progress Note      Patient Name:  Tomas Jean-Baptiste  : 2000  MRN: [id number]    Location of Service:  [facility]  Date of Service: 2022  Time: 4:30 PM    Hospital Day: 5    Protocol / Treatment Plan: [place] Chromosome Precursor B-Cell Acute Lymphoblastic Leukemia, ON STUDY [id number], Delayed Intensification, Day 26    SUBJECTIVE:     Low grade temperature with Tmax of 99.5 F at 0000. Reports that back pain and leg pain is much improved with pain medications. On scheduled lasix and appears less edematous today. Breathing comfortably, weaning supplemental oxygen . Stool appears more dark. Switched to protonix IV BID from continuous drip. Still requiring platelet transfusion to maintain above threshold. Continues to be off of vasopressors and maintaining adequate BP.  Continues on stress dose hydrocortisone.Tolerated sips of water and advancing diet slowly. Able to drink smoothie . Blotchy erythematous rash on the face and trunk which is much improved. Continues on cefepime. Repeat blood culture obtained yesterday shows no growth so far. On sliding scale insulin for elevated serum glucose.    Review of Systems:     Constitutional: Critically ill initially but improving slowly, appears less edematous  HENT: Negative for auditory changes or ear pain, no congestion and rhinorrhea, no sore throat.  No mouth sores. Lips appear more moist with no crusted blood visible.  Eyes: Negative for visual changes.  Respiratory: Oxygen supplement via NC  Cardiovascular: Positive for leg swelling after fluid resuscitation which is improving  Gastrointestinal: Negative for nausea, vomiting, abdominal pain, diarrhea, constipation. Blood in stool. NG removed  Genitourinary: Negative for dysuria.  Musculoskeletal: Positive for back pain which is improving. Leg pain improving    Skin: Positive for blotchy, erythematous rash which is almost resolved  Neurological: Alert  "and oriented  Endo/Heme/Allergies: Bleeds easily   Psychiatric/Behavioral: Anxious    OBJECTIVE:     Max Temp: Temp (24hrs), Av.4 °C (97.6 °F), Min:35.8 °C (96.5 °F), Max:37.7 °C (99.9 °F)      Vitals: /70   Pulse (!) 116   Temp 37.3 °C (99.2 °F) (Temporal)   Resp 12   Ht 1.664 m (5' 5.5\")   Wt 73 kg (160 lb 15 oz)   SpO2 96%   BMI 26.37 kg/m²     I/O:   Intake/Output Summary (Last 24 hours) at 2022 1628  Last data filed at 2022 1606  Gross per 24 hour   Intake 2959.75 ml   Output 9100 ml   Net -6140.25 ml         Labs:  Recent Labs     22  0108 22  0551 22  1235   WBC 0.1* 0.2* 0.2*   RBC 3.12* 3.13* 3.14*   HEMOGLOBIN 8.9* 8.8* 8.8*   HEMATOCRIT 25.7* 25.5* 25.6*   MCV 82.4 81.5 81.5   MCH 28.5 28.1 28.0   RDW 49.9 49.8 49.7   PLATELETCT 31* 21* 54*   MPV 10.3 9.9 10.0       Recent Labs     22  0108 22  0551 22  1100   SODIUM 137 138 135   POTASSIUM 3.5* 3.8 3.5*   CHLORIDE 101 103 99   CO2 32 32 32   GLUCOSE 205* 134* 178*   BUN 11 11 11           Component 3 d ago    Significant Indicator POS Positive (POS)    Source BLD    Site PERIPHERAL    Culture Result Growth detected by Bactec instrument. 2022  21:20 Abnormal     Culture Result Escherichia coli Abnormal     Culture Result Klebsiella pneumoniae Abnormal               CXR: 2022  There worsening bilateral perihilar infiltrates.  There is no pleural effusion.  The heart is enlarged.  There is a right subclavian Port-A-Cath.      Repeat Blood culture 2022: Pending      Physical Exam:    Constitutional: Much improved, pallor improved. Edema improved  HENT: Normocephalic and atraumatic. Alopecia.  No rhinorrhea. Lips more moist, previously noted crusted blood not seen  Eyes: Conjunctivae are normal. EOMI.   Neck: Normal range of motion of neck, no adenopathy.    Cardiovascular: HR much improved, regular rhythm. DP/radial pulses 2+, cap refill < 2 sec  Pulmonary/Chest: Effort " normall. No respiratory distress. Symmetric expansion.  No crackles or wheezes.  Abdomen: Soft. Bowel sounds are normal. No distension and no mass. There is no hepatosplenomegaly.   Genitourinary:  Condom cath+  Musculoskeletal: Not assessed  Neurological: Alert and oriented to person and place.   Skin: Blotchy, erythematous rash on the face and trunk almost resolved. Port R chest wall, dressing c/d/i  Psychiatric : Anxious    ASSESSMENT AND PLAN:     Tomas Jean-Baptiste is a 22 y.o. male with [place] Chromosome Precursor B-Cell Acute Lymphoblastic Leukemia who is receiving therapy per protocol [id number], on study presents to the [facility] Emergency Department with gram negative septic shock and DIC. Culture now growing (within 7 hrs of drawing blood culture) E.Coli and Klebsiella. Remains on Cefepime. Critically ill requiring intensive care. Clinically improving after agressive management.    E.coli and Klebsiella sepsis/septic shock in an immunocompromised host:  - Blood culture from 12/27/2022: E.coli and Klebsiella  - Susceptible to cefepime, continue  - Wean pressors per CICU: currently off of pressors  - Repeat blood culture obtained 12/20/2022: Pending  - Continue stress dose hydrocortisone (changed to hydrocortisone 50 mg every 6 hrs)  - ID consulted for further input, they recommend removal of port when stable     Ph+ Precursor B-Cell Acute Lymphoblastic Leukemia, in MRD Remission: Receiving therapy   TAT [id number], AR Arm B, Delayed Intensification, Day 26  - HOLD imatinib given patient critically ill  - Not due for any chemotherapy until Day 29 which will be delayed due to current tenuous clinical condition  - If no improvement in clinical status, will consider starting G-CSF vs granulocyte infusion  - HOLD apixaban that was started on Day 8 to prevent pegaspargase induced clots.  - HOLD Bactrim (for PJP prophylaxis) this weekend given patient severely myelosuppressed. Restart  next weekend or when ready.     Therapy related pancytopenia:  - Transfuse to maintain hemoglobin >8 gm/dL or with symptoms/volume replacement for hypotension  - Transfuse to maintain platelets >30,000/microliters (threshold higher given ongoing hematochezia) or with symptoms/volume replacement for hypotension  - Use irradiated/leukoreduced products  - CBC daily      DIC secondary to septic shock: improving  - S/p FFP and cryoprecipitate   - Fibrinogen level elevated, PTT WNL but PT still prolonged  - Continue to monitor DIC panel   - On IV protonix BID due to bloody emesis/stool    Blotchy erythematous rash: resolving  - Likely multifactorial: fever, transfusion related, engraftment?  - Currently, non bothersome. Will continue to monitor     Rest of the management per ICU team. Discussed with ICU attending, patient's mother and bedside nurse. Patient improving.        Alyce Duenas MD  Pediatric Hematology / Oncology  [facility]  Cell.  [phone]  Office. [phone]

## 2023-01-01 NOTE — PROGRESS NOTES
[facility] ICU Progress Note      Admit Date: 12/27/2022    Resident(s): Milo Garcia M.D.   Attending:  EDMUND BONILLA/ Dr. Bernal    Patient ID:    Name:  Tomas Jean-Baptiste   YOB: 2000  Age:  22 y.o.  male   MRN:  [id number]    Hospital Course (carried forward and updated):  Tomas Jean-Baptiste is a 22 y.o. male with has a history of B cell acute lymphoblastic leukemia.  He has been on imatinib therapy since June 2022, and last received chemotherapy on 12/20/2022, which included vincristine, doxorubicin, Dexrazoxane, and PEG-aspargase.  He was then placed on a 7-day course of dexamethasone 9 mg oral twice daily, and did take famotidine for GI prophylaxis with this.  He also continued taking his imatinib.  And due to being at risk for thrombotic events from the PEG-aspargase, he was restarted 2 weeks before admission on apixaban 5 mg p.o. twice daily which she was taking at home up until presentation.        Presentation is most consistent with DIC. Hematology/oncology consulted. Patient did vomit a liter of blood from his stomach. Gastroenterology was notified, and opinion is that this is likely hemorrhagic gastritis from DIC and that there is no benefit in endoscopic intervention. Patient received 4 unit prbc, 2 unit platelet 2 unit cryo, his laboratories considerably improved after resus. Patient admitted to ICU for further management.     12/28 Patient NG tube removed, tolerating diet so far, advancing slowly. Laboratories considerably improving after overnight colloid resus. Clinically appears improving as well. Vancomycin discontinued, ID consulted for gram negative bacteremia. PPI drip changed to BID IV.    12/29 Continued to receive multiple transfusions. PPI gtt restarted. Continued to have maroon stools.    12/30 Continues to have bowel movements that are becoming darker. Received 1u PLT this AM and 1u pRBC on repeat labs. Still on 8L NC, UOP 7.6L on diuresis. Bcx with E coli  and Klebsiella sensitive to Cefepime. Repeat Bcx ordered.       12/31 Required additional platelets this AM. Worsening movements with nurses, requires PT eval and treat. Still black BM. Hg stabilized, platelets still low.      Consultants:  Critical Care  Hematology/Oncology  Infectious Disease      Interval Events:    Considerably improved strength after mobilization, pallor improving. Required yet another platelet transfusion overnight, will relax to goal of 20. Will relax hg goal to >7.0. Discontinuing lasix.     Vitals Range last 24h:  Temp:  [36.3 °C (97.4 °F)-37.3 °C (99.2 °F)] 36.6 °C (97.8 °F)  Pulse:  [] 114  Resp:  [8-34] 11  BP: (124-145)/(61-91) 126/76  SpO2:  [93 %-100 %] 96 %      Intake/Output Summary (Last 24 hours) at 1/1/2023 1149  Last data filed at 1/1/2023 1000  Gross per 24 hour   Intake 1869.32 ml   Output 6450 ml   Net -4580.68 ml          Review of Systems   Constitutional:  Positive for malaise/fatigue. Negative for chills, diaphoresis and fever.   Eyes:  Negative for blurred vision.   Respiratory:  Positive for shortness of breath. Negative for cough and sputum production.    Cardiovascular:  Negative for chest pain, palpitations and leg swelling.   Gastrointestinal:  Positive for blood in stool. Negative for abdominal pain, constipation, diarrhea, nausea and vomiting.   Genitourinary:  Negative for dysuria and hematuria.   Neurological:  Positive for weakness. Negative for dizziness and headaches.     PHYSICAL EXAM:  Vitals:    01/01/23 0800 01/01/23 0829 01/01/23 0900 01/01/23 1000   BP: (!) 145/90 (!) 145/90 124/71 126/76   Pulse: 60 81 (!) 101 (!) 114   Resp: (!) 10 12 (!) 11 (!) 11   Temp: 36.6 °C (97.8 °F)      TempSrc: Temporal      SpO2: 100% 97% 97% 96%   Weight:       Height:        Body mass index is 26.37 kg/m².    O2 therapy: Pulse Oximetry: 96 %, O2 (LPM): 1, O2 Delivery Device: Silicone Nasal Cannula    Date 01/01/23 0700 - 01/02/23 0659   Shift 1413-0766 1301-7049  2881-7678 24 Hour Total   INTAKE   P.O. 0   0     P.O. 0   0   IV Piggyback 175.1   175.1     Volume (mL) (potassium chloride in water (KCL) ivpb **Administer in ICU only** 20 mEq) 15.9   15.9     Volume (mL) (potassium phosphate IVPB 30 mmol in 500 mL D5W (premix)) 150   150     Volume (mL) (potassium chloride (KCL) ivpb 10 mEq) 9.3   9.3   Shift Total 175.1   175.1   OUTPUT   Shift Total       .1   175.1          Physical Exam  Vitals and nursing note reviewed.   Constitutional:       Appearance: He is ill-appearing. He is not diaphoretic.   HENT:      Head: Normocephalic and atraumatic.      Mouth/Throat:      Mouth: Mucous membranes are dry.   Eyes:      Conjunctiva/sclera: Conjunctivae normal.      Pupils: Pupils are equal, round, and reactive to light.   Cardiovascular:      Rate and Rhythm: Normal rate and regular rhythm.      Pulses: Normal pulses.      Heart sounds: No murmur heard.  Pulmonary:      Effort: Pulmonary effort is normal.      Comments: Nasal cannula in place  Abdominal:      General: Abdomen is flat. There is no distension.      Palpations: Abdomen is soft.      Tenderness: There is no abdominal tenderness.   Musculoskeletal:      Right lower leg: No edema.      Left lower leg: No edema.   Skin:     General: Skin is warm and dry.      Capillary Refill: Capillary refill takes less than 2 seconds.   Neurological:      Mental Status: He is alert and oriented to person, place, and time. Mental status is at baseline.   Psychiatric:         Mood and Affect: Mood normal.       Recent Labs     12/31/22  2105 01/01/23  0502   ISTATTEMP 98.7 F 97.4 F   ISTATSPEC Venous Venous       Recent Labs     12/30/22  1632 12/31/22  0108 12/31/22  0551 12/31/22  1100 12/31/22  2100 01/01/23  0500   SODIUM 140   < > 138 135 134* 133*   POTASSIUM 2.9*   < > 3.8 3.5* 3.5* 3.7   CHLORIDE 102   < > 103 99 98 99   CO2 32   < > 32 32 31 30   BUN 14   < > 11 11 12 13   CREATININE 0.36*   < > 0.26* 0.29* 0.30* 0.25*    MAGNESIUM 1.9  --  2.4  --   --  1.9   PHOSPHORUS 1.9*  --  2.0*  --   --  2.0*   CALCIUM 7.7*   < > 7.4* 7.3* 7.6* 7.7*    < > = values in this interval not displayed.       Recent Labs     12/30/22  0508 12/30/22  0510 12/31/22  0551 12/31/22  1100 12/31/22 2100 01/01/23  0500   ALTSGPT 164*  --  117*  --   --  88*   ASTSGOT 77*  --  35  --   --  23   ALKPHOSPHAT 147*  --  170*  --   --  172*   TBILIRUBIN 2.2*  --  0.9  --   --  0.6   GLUCOSE 168*   < > 134* 178* 178* 129*    < > = values in this interval not displayed.       Recent Labs     12/31/22  1100 12/31/22  1235 12/31/22 2100 01/01/23  0500   RBC  --  3.14* 3.06* 2.88*   HEMOGLOBIN  --  8.8* 8.5* 8.1*   HEMATOCRIT  --  25.6* 25.3* 23.9*   PLATELETCT  --  54* 27* 52*   PROTHROMBTM 15.3*  --  14.8* 14.1   APTT 34.1  --  29.0 29.1   INR 1.23*  --  1.17* 1.10       Recent Labs     12/30/22  0508 12/30/22  0510 12/31/22  0551 12/31/22  1235 12/31/22 2100 01/01/23  0500   WBC  --    < > 0.2* 0.2* 0.3* 0.4*   ASTSGOT 77*  --  35  --   --  23   ALTSGPT 164*  --  117*  --   --  88*   ALKPHOSPHAT 147*  --  170*  --   --  172*   TBILIRUBIN 2.2*  --  0.9  --   --  0.6    < > = values in this interval not displayed.         Meds:   potassium phosphate  30 mmol 30 mmol (01/01/23 0811)    potassium phosphate IVPB (CUSTOM)  15 mmol      pantoprazole  40 mg      cefepime  2 g Stopped (01/01/23 0712)    ondansetron  4 mg      HYDROmorphone  0.4 mg      Or    HYDROmorphone  0.6 mg      insulin regular  1-6 Units      And    dextrose bolus  25 g 25 g (12/28/22 0316)        Procedures:  N/A    Imaging:  DX-CHEST-PORTABLE (1 VIEW)   Final Result      Stable bilateral pulmonary opacities. No significant pleural effusions.         DX-CHEST-PORTABLE (1 VIEW)   Final Result      Stable bilateral pulmonary infiltrates.      DX-CHEST-PORTABLE (1 VIEW)   Final Result      Worsening bilateral pulmonary infiltrates.      DX-CHEST-PORTABLE (1 VIEW)   Final Result      1.  Stable  patchy bilateral interstitial opacities which could be seen in setting of edema and/or infection.   2.  Stable mild enlargement of the cardiomediastinal silhouette.      DX-CHEST-PORTABLE (1 VIEW)   Final Result      1.  Supportive tubing as described above.   2.  No other significant change from prior exam.         DX-CHEST-PORTABLE (1 VIEW)   Final Result      No significant change from prior exam.      DX-CHEST-PORTABLE (1 VIEW)   Final Result      Worsening hypoinflation and bilateral perihilar infiltrates as well as prominence of the pulmonary vasculature suggesting pulmonary edema.  Superimposed pneumonia is not excluded.      EC-ECHOCARDIOGRAM LTD W/O CONT   Final Result      US-EXTREMITY VENOUS LOWER BILAT   Final Result      CT-ABDOMEN-PELVIS WITH   Final Result      1.  Hepatic steatosis.   2.  No acute inflammatory abnormality within the abdomen or pelvis.      CT-MAXILLOFACIAL W/O PLUS RECONS   Final Result      1.  Essentially negative maxillofacial sinus CT. Small retention cyst in the alveolar recess of the right maxillary sinus of no clinical significance.      CT-HEAD W/O   Final Result      Head CT without contrast within normal limits. No evidence of acute cerebral infarction, hemorrhage or mass lesion.         DX-CHEST-PORTABLE (1 VIEW)   Final Result      No evidence of acute cardiopulmonary process.          ASSESSEMENT and PLAN:    * Shock (HCC)- (present on admission)  Assessment & Plan  -Hemorrhagic shock from DIC  -Hemorrhagic gastritis evident  -NG tube placement for suction  -BID CBC, transfuse for hemoglobin less than 7, transfuse for platelets less than 20  -Every 4 hour PT/PTT/INR, transfuse FFP for INR greater than 2 if ongoing active bleeding is evident  -If evidence of developing volume overload, low threshold to resume IV Lasix   -GI has been consulted, there is no benefit from an endoscopic interventions and hemorrhagic gastritis from DIC  -IV Protonix BID  -Off of vasopressor  support   -Continue stress dose steroids    Acute respiratory failure with hypoxia (HCC)  Assessment & Plan  CXR with cardiomegaly and diffuse infiltrates in setting of large volumes of blood product and IVF resuscitation  UOP overshot goal after Lasix  -Discontinue Lasix 20mg IV BID, low threshold to resume if difficulty breathing with evidence volume overload.  -Strict I/O's  -Wean supplemental O2 as tolerated  -Daily CXR AM    Gram-negative bacteremia  Assessment & Plan  E coli and Klebsiella bacteremia sensitive to Cefepime  ID following  -Repeat Blood cultures on 12/30 - NGTD  -Continue Cefepime  -Continue to monitor neutropenia, GSCF per heme/onc    Elevated troponin  Assessment & Plan  Likely demand ischemia from severe shock  No ischemic changes noted on EKG  Downtrended, no need to continue to monitor    Hypocalcemia  Assessment & Plan  Resolved  -Monitor carefully, as patient is getting a lot of blood products which chelate and worsen hypocalcemia    Hyperkalemia  Assessment & Plan  Noted in the setting of DIC  Resolved, now hypokalemic    Upper GI bleed  Assessment & Plan  -Upon admission patient noted to vomit almost a liter of blood  -Opinion is that this is likely from hemorrhagic gastritis likely in setting of recent high dose steroids with chemotherapy  -Also notable that patient was recently on steroids for 2 weeks, but was taking prophylactic H2 blockers  -Per GI opinion, no benefit in endoscopy until more clinically stable  -Treat DIC and hemorrhagic shock as above  -IV Protonix now off drip, continue BID    Neutropenic sepsis (HCC)  Assessment & Plan  This is Severe Sepsis Present on admission  SIRS criteria identified on my evaluation include: Tachycardia, with heart rate greater than 90 BPM, Tachypnea, with respirations greater than 20 per minute and Leukopenia, with WBC less than 4,000  Clinical indicators of end organ dysfunction include Systolic blood pressure (SBP) <90 mmHg or mean arterial  "pressure <65 mmHg  Source is unknown  Sepsis protocol initiated  Crystalloid Fluid Administration: Fluid resuscitation ordered per standard protocol - 30 mL/kg per current or ideal body weight  IV antibiotics as appropriate for source of sepsis  Reassessment: I have reassessed the patient's hemodynamic status  Blood cultures with E coli and Klebsiella pneumoniae  MRSA nares negative  CT head, maxillofacial, abdomen/pelvis without any source of infection  Urinalysis also does not appear infected  -ID following, continue Cefepime  -Repeat blood cultures on 12/30 - NGTD  -Resume pneumocystis ppx Monday JAN 02 per ID recs  -Continue stress dose steroids  -Weaned off of vasopressor support    Hyperglycemia  Assessment & Plan  Recent steroids for the last 2 weeks  Currently on stress dose steroids  -LDSSI plus hypoglycemia protocol      Lactic acid acidosis  Assessment & Plan  Resolved    High anion gap metabolic acidosis  Assessment & Plan  In the setting of severe hypovolemia in the setting of hemorrhagic shock and acute kidney injury  Resolved    Acute kidney injury (HCC)  Assessment & Plan  Mostly likely secondary to hemorrhagic shock  Resolved with restoration of hemodynamics and ongoing blood product support  Strict I/O's  Resolved  Continue to monitor    At high risk for venous thromboembolism (VTE)  Assessment & Plan  -Hold home apixaban in setting of active bleeding and DIC    DIC (disseminated intravascular coagulation) (HCC)  Assessment & Plan  Fibrinogen <60 on admission trending up to 690s, no evidence of hemolysis on labs  Mild T bili elevation, LDH mild elevation to 290s, Haptoglobin normal  Pancytopenia in all cell lines and PTT/INR/PT all elevated also consistent with DIC  LE U/S negative for DVT  TTE EF 65% no wall motion abnormality  -Supportive care with transfusions of PRBC/platelets/FFP as detailed in \"Shock\"  -Heme/Onc followign  -BID labs  -Hold home Eliquis  -Pain control with IV narcotics prn " ordered    Acute lymphoblastic leukemia (ALL) (HCC)  Assessment & Plan  Last chemotherapy treatment on 12/20/2022, where patient received vincristine, doxorubicin, Dexrazoxane, and PEG-aspargase  He has been on apixaban for the last 2 weeks due to risk for PEG-aspargase related thrombotic events  -Hold apixaban in setting of hemorrhagic shock from DIC  -He was also on steroids which could have exacerbated his hemorrhagic gastritis  -IV Protonix BID  -Heme/Onc following  -Generalized weakness improving with more aggressive mobilization, likely hospital de-conditioning, appreciate PT/OT recs    Hypokalemia  Assessment & Plan  K scale ordered  Serial BMP monitoring  Replete as per protocol        DISPO: Continue ICU hospitalization    CODE STATUS: Full Code    Quality Measures:  Feeding: Tube feeds  Analgesia: N/A  Sedation: N/A  Thromboprophylaxis: SCD's, chemical VTE PPX contraindicated in active bleeding  Head of bed: >30 degrees  Ulcer prophylaxis: Pantoprazole infusion  Glycemic control: Correctional: LDSSI / Basal: N/A  Bowel care: bowel regimen  Indwelling lines: R chest port, L AC PIV, R AC PIV, L FA PIV, Condom catheter  Deescalate of antibiotic: Cefepime, ID following      Milo Garcia M.D.

## 2023-01-01 NOTE — ASSESSMENT & PLAN NOTE
Thought to be secondary to severe thrombocytopenia.  No further episodes of GI bleed.  Hemoglobin has been stable.  IV Protonix was switched to p.o.

## 2023-01-01 NOTE — CONSULTS
Hospital Medicine Consultation    Date of Service  1/1/2023    Referring Physician  Uriel Bernal M.D.    Consulting Physician  Ramos Styles D.O.    Reason for Consultation  Assuming medical management    History of Presenting Illness  22 y.o. male who presented 12/27/2022 with a history of a LL currently on imatinib, vincristine, doxorubicin, dexraxoxane and PEG-aspargase. his last chemo was on December 20 following which she was placed on Decadron.  He also has medical history of DVT for which she was on apixaban.  Patient presented with vomiting and dizziness and a syncopal event.  In the ED the patient was found to be profoundly pancytopenic with evidence of severe DIC and hematemesis.  Patient was admitted to the ICU with her hemorrhagic shock, GI bleed, DIC.  Consultations were obtained from pediatric oncology who follows the patient as an outpatient, as well as pulmonology and ID.    In the ICU the patient was requiring vasopressors, and received significant blood products.  His initial white count was 0.3 with an ANC of 0, hemoglobin 4.4, and platelet count in the single digit.  Patient eventually grew out Klebsiella and E. coli from blood cultures.  He is currently on cefepime per ID recommendations.  He does have a port in place which they recommended removing when feasible.    Review of Systems  Review of Systems   Constitutional:  Negative for chills and fever.   HENT:  Negative for nosebleeds and sore throat.    Eyes:  Negative for blurred vision and double vision.   Respiratory:  Negative for cough and shortness of breath.    Cardiovascular:  Positive for leg swelling. Negative for chest pain and palpitations.   Gastrointestinal:  Negative for abdominal pain, diarrhea, nausea and vomiting.   Genitourinary:  Negative for dysuria and urgency.   Musculoskeletal:  Negative for back pain.   Skin:  Negative for rash.   Neurological:  Positive for weakness. Negative for dizziness, loss of  consciousness and headaches.     Past Medical History   has a past medical history of Cancer (HCC) and Leukoma.    Surgical History   has a past surgical history that includes cath placement (Right, 8/29/2022).    Family History  family history includes Diabetes in his paternal grandfather; Hyperlipidemia in his paternal grandfather; Hypertension in his paternal grandfather; No Known Problems in his mother; Stroke in his maternal grandmother.    Social History   reports that he has never smoked. He has never used smokeless tobacco. He reports that he does not drink alcohol and does not use drugs.    Medications  Prior to Admission Medications   Prescriptions Last Dose Informant Patient Reported? Taking?   apixaban (ELIQUIS) 5mg Tab 12/27/2022 at AM Family Member Yes Yes   Sig: Take 5 mg by mouth 2 times a day.   chlorhexidine (PERIDEX) 0.12 % Solution 12/27/2022 at AM Family Member No No   Sig: Swish and spit 15 mL by mouth 2 times a day.   dexamethasone (DECADRON) 1.5 MG Tab 12/26/2022 at PM Family Member No No   Sig: Take 2 Tablets by mouth every 12 hours.   dexamethasone (DECADRON) 6 MG Tab 12/26/2022 at PM Family Member No No   Sig: Take 1 Tablet by mouth every 12 hours.   famotidine (PEPCID) 20 MG Tab 12/27/2022 at AM Family Member No No   Sig: Take 1 Tablet by mouth 2 times a day.   imatinib (GLEEVEC) 100 MG tablet 12/26/2022 at PM Family Member Yes Yes   Sig: Take 200 mg by mouth every day. 200 mg = 2 tablets   imatinib (GLEEVEC) 400 MG tablet 12/27/2022 at AM Family Member No No   Sig: TAKE 1 TABLET BY MOUTH  DAILY WITH TWO 100MG (600MG TOTAL DOSE)   Patient taking differently: Take 400 mg by mouth every day.   ondansetron (ZOFRAN ODT) 8 MG TABLET DISPERSIBLE PRN at PRN Family Member No No   Sig: Dissovle 1 Tablet by mouth every 8 hours as needed for Nausea.   sulfamethoxazole-trimethoprim (BACTRIM) 400-80 MG Tab 12/25/2022 at PM Family Member Yes Yes   Sig: Take 2 Tablets by mouth 2 times a day. Only on  Saturday & Sunday   therapeutic multivitamin-minerals (THERAGRAN-M) Tab 2 weeks ago at [facility] Family Member Yes No   Sig: Take 1 Tablet by mouth every day.   Patient not taking: Reported on 12/20/2022   vitamin D3 (CHOLECALCIFEROL) 1000 Unit (25 mcg) Tab 2 weeks ago at [facility] Family Member Yes No   Sig: Take 1 Tablet by mouth every day.   Patient not taking: Reported on 12/20/2022      Facility-Administered Medications: None       Allergies  Allergies   Allergen Reactions    Amoxicillin Rash     Reacted as an infant. No swelling or airway problems.  Tolerated cefepime June 2022       Physical Exam  Temp:  [36.3 °C (97.4 °F)-36.7 °C (98 °F)] 36.6 °C (97.8 °F)  Pulse:  [] 109  Resp:  [8-34] 15  BP: (124-145)/(61-91) 133/79  SpO2:  [93 %-100 %] 98 %    Physical Exam  Constitutional:       General: He is not in acute distress.     Appearance: He is well-developed. He is not diaphoretic.   HENT:      Head: Normocephalic and atraumatic.   Eyes:      Conjunctiva/sclera: Conjunctivae normal.   Neck:      Vascular: No JVD.   Cardiovascular:      Rate and Rhythm: Normal rate.      Heart sounds: No murmur heard.    No gallop.   Pulmonary:      Effort: Pulmonary effort is normal. No respiratory distress.      Breath sounds: No stridor. No wheezing or rales.   Abdominal:      Palpations: Abdomen is soft.      Tenderness: There is no abdominal tenderness. There is no guarding or rebound.   Musculoskeletal:         General: Tenderness present.      Right lower leg: Edema present.      Left lower leg: Edema present.      Comments: Trace edema  Calves TTP but no cords   Skin:     General: Skin is warm and dry.      Findings: No rash.   Neurological:      General: No focal deficit present.      Mental Status: He is alert and oriented to person, place, and time.   Psychiatric:         Mood and Affect: Mood normal.         Behavior: Behavior normal.         Thought Content: Thought content normal.       Fluids  Date 01/01/23 0700 -  01/02/23 0659   Shift 2691-0185 7809-3610 0765-5682 24 Hour Total   INTAKE   P.O. 0   0   IV Piggyback 175.1   175.1   Shift Total 175.1   175.1   OUTPUT   Urine 1200   1200   Shift Total 1200   1200   Weight (kg) 73 73 73 73       Laboratory  Recent Labs     12/31/22  1235 12/31/22 2100 01/01/23  0500   WBC 0.2* 0.3* 0.4*   RBC 3.14* 3.06* 2.88*   HEMOGLOBIN 8.8* 8.5* 8.1*   HEMATOCRIT 25.6* 25.3* 23.9*   MCV 81.5 82.7 83.0   MCH 28.0 27.8 28.1   MCHC 34.4 33.6* 33.9   RDW 49.7 51.3* 51.6*   PLATELETCT 54* 27* 52*   MPV 10.0 9.8 10.4     Recent Labs     12/31/22  1100 12/31/22 2100 01/01/23  0500   SODIUM 135 134* 133*   POTASSIUM 3.5* 3.5* 3.7   CHLORIDE 99 98 99   CO2 32 31 30   GLUCOSE 178* 178* 129*   BUN 11 12 13   CREATININE 0.29* 0.30* 0.25*   CALCIUM 7.3* 7.6* 7.7*     Recent Labs     12/31/22  1100 12/31/22  2100 01/01/23  0500   APTT 34.1 29.0 29.1   INR 1.23* 1.17* 1.10                 Imaging  DX-CHEST-PORTABLE (1 VIEW)   Final Result      Stable bilateral pulmonary opacities. No significant pleural effusions.         DX-CHEST-PORTABLE (1 VIEW)   Final Result      Stable bilateral pulmonary infiltrates.      DX-CHEST-PORTABLE (1 VIEW)   Final Result      Worsening bilateral pulmonary infiltrates.      DX-CHEST-PORTABLE (1 VIEW)   Final Result      1.  Stable patchy bilateral interstitial opacities which could be seen in setting of edema and/or infection.   2.  Stable mild enlargement of the cardiomediastinal silhouette.      DX-CHEST-PORTABLE (1 VIEW)   Final Result      1.  Supportive tubing as described above.   2.  No other significant change from prior exam.         DX-CHEST-PORTABLE (1 VIEW)   Final Result      No significant change from prior exam.      DX-CHEST-PORTABLE (1 VIEW)   Final Result      Worsening hypoinflation and bilateral perihilar infiltrates as well as prominence of the pulmonary vasculature suggesting pulmonary edema.  Superimposed pneumonia is not excluded.       EC-ECHOCARDIOGRAM LTD W/O CONT   Final Result      US-EXTREMITY VENOUS LOWER BILAT   Final Result      CT-ABDOMEN-PELVIS WITH   Final Result      1.  Hepatic steatosis.   2.  No acute inflammatory abnormality within the abdomen or pelvis.      CT-MAXILLOFACIAL W/O PLUS RECONS   Final Result      1.  Essentially negative maxillofacial sinus CT. Small retention cyst in the alveolar recess of the right maxillary sinus of no clinical significance.      CT-HEAD W/O   Final Result      Head CT without contrast within normal limits. No evidence of acute cerebral infarction, hemorrhage or mass lesion.         DX-CHEST-PORTABLE (1 VIEW)   Final Result      No evidence of acute cardiopulmonary process.          Assessment/Plan  * Shock (HCC)- (present on admission)  Assessment & Plan  Septic shock  GNR bacteremia  Abx's per ID  Repeat cultures 12/30 remain neg thus far    Gram-negative bacteremia  Assessment & Plan  EColi and Klebsiella  On Cefepime per ID  Repeat cultures 12/30 remain neg  In setting of neutropenia and leukopenia    Hypocalcemia  Assessment & Plan  atleast in part due to massive transfusion  Cont to follow and replace    Hyperkalemia  Assessment & Plan  resolved    Upper GI bleed  Assessment & Plan  In setting of platelet count in the single digits  Have not pursued EGD as thought secondary to severe thrombocytopenia  Bleeding appears to have stopped  Cont to follow H&H  Cont pantorprazole and change to po    Acute kidney injury (HCC)  Assessment & Plan  Responded to resuscitation    At high risk for venous thromboembolism (VTE)  Assessment & Plan  No chemical DVT prophylaxis given thrombocytopenia    DIC (disseminated intravascular coagulation) (HCC)  Assessment & Plan  In setting of sepsis  S/p multiple transfusions of blood products    Acute lymphoblastic leukemia (ALL) (HCC)  Assessment & Plan  Ph+ Precursor B-Cell Acute Lymphoblastic Leukemia, in MRD Remission: Receiving therapy   HONEY [id number], AR Arm  B, Delayed Intensification, Day 24    Onc following    Hypokalemia  Assessment & Plan  Cont to follow and replace  Follow and replace mg

## 2023-01-01 NOTE — PROGRESS NOTES
Infectious Disease Progress Note    Author: Nova Louis M.D. 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 Found also to be in septic shock:blood cultures Ecoli and Kleb   AF (99.9) WBC 0.1 ANC 0 tolerating abx remains weak and edematous   AF (99.4) WBC 0.1 potassium 2.9 trop 155 platelets 9 sleeping soundly at time of rounds   AF WBC 0.2 alert today-feeling a little better-no further active bleeding No pressors   AF WBC 0.4 up to chair-weak and c/o BLE pain. Continued melena noted  Labs Reviewed, Medications Reviewed, and Radiology Reviewed.    Review of Systems:  Review of Systems   Constitutional:  Negative for fever.   Respiratory:  Negative for cough, hemoptysis and shortness of breath.    Cardiovascular:  Positive for leg swelling.        Decreased   Gastrointestinal:  Negative for abdominal pain, diarrhea, nausea and vomiting.   All other systems reviewed and are negative.    Hemodynamics:  Temp (24hrs), Av.6 °C (97.8 °F), Min:36.3 °C (97.4 °F), Max:36.7 °C (98 °F)  Temperature: 36.6 °C (97.8 °F)  Pulse  Av.1  Min: 60  Max: 179   Blood Pressure: 133/79       Physical Exam:  Physical Exam  Vitals and nursing note reviewed.   Constitutional:       General: He is not in acute distress.     Appearance: He is ill-appearing. He is not toxic-appearing or diaphoretic.   HENT:      Nose: No rhinorrhea.      Mouth/Throat:      Pharynx: No oropharyngeal exudate or posterior oropharyngeal erythema.   Eyes:      General: No scleral icterus.     Extraocular Movements: Extraocular movements intact.      Pupils: Pupils are equal, round, and reactive to light.   Cardiovascular:      Rate and Rhythm: Tachycardia present.   Pulmonary:      Effort: Pulmonary effort is normal. No respiratory distress.      Breath sounds: No stridor.    Abdominal:      General: There is no distension.      Palpations: Abdomen is soft.      Tenderness: There is no abdominal tenderness.   Musculoskeletal:         General: Swelling present.      Cervical back: Neck supple. No rigidity.   Skin:     Coloration: Skin is not jaundiced.      Findings: Bruising present.   Neurological:      General: No focal deficit present.      Mental Status: He is alert and oriented to person, place, and time.   Psychiatric:         Mood and Affect: Mood normal.         Behavior: Behavior normal.       Meds:    Current Facility-Administered Medications:     potassium phosphate    potassium phosphate IVPB (CUSTOM)    pantoprazole    cefepime    ondansetron    HYDROmorphone **OR** HYDROmorphone    insulin regular **AND** POC blood glucose manual result **AND** NOTIFY MD and PharmD **AND** Administer 20 grams of glucose (approximately 8 ounces of fruit juice) every 15 minutes PRN FSBG less than 70 mg/dL **AND** dextrose bolus    Labs:  Recent Labs     12/31/22  1235 12/31/22  2100 01/01/23  0500   WBC 0.2* 0.3* 0.4*   RBC 3.14* 3.06* 2.88*   HEMOGLOBIN 8.8* 8.5* 8.1*   HEMATOCRIT 25.6* 25.3* 23.9*   MCV 81.5 82.7 83.0   MCH 28.0 27.8 28.1   RDW 49.7 51.3* 51.6*   PLATELETCT 54* 27* 52*   MPV 10.0 9.8 10.4       Recent Labs     12/31/22  1100 12/31/22  2100 01/01/23  0500   SODIUM 135 134* 133*   POTASSIUM 3.5* 3.5* 3.7   CHLORIDE 99 98 99   CO2 32 31 30   GLUCOSE 178* 178* 129*   BUN 11 12 13       Recent Labs     12/30/22  0508 12/30/22  0510 12/31/22  0551 12/31/22  1100 12/31/22  2100 01/01/23  0500   ALBUMIN 2.0*  --  1.8*  --   --  2.1*   TBILIRUBIN 2.2*  --  0.9  --   --  0.6   ALKPHOSPHAT 147*  --  170*  --   --  172*   TOTPROTEIN 4.3*  --  4.4*  --   --  4.6*   ALTSGPT 164*  --  117*  --   --  88*   ASTSGOT 77*  --  35  --   --  23   CREATININE 0.39*   < > 0.26* 0.29* 0.30* 0.25*    < > = values in this interval not displayed.         Imaging:  CT-ABDOMEN-PELVIS WITH    Result  Date: 12/27/2022 12/27/2022 1:47 PM HISTORY/REASON FOR EXAM:  Sepsis. Nausea and vomiting. Abdominal pain chemotherapy for cancer. TECHNIQUE/EXAM DESCRIPTION:   CT scan of the abdomen and pelvis with contrast. Contrast-enhanced helical scanning was obtained from the diaphragmatic domes through the pubic symphysis following the bolus administration of nonionic contrast without complication. 100 mL of Omnipaque 350 nonionic contrast was administered without complication. Low dose optimization technique was utilized for this CT exam including automated exposure control and adjustment of the mA and/or kV according to patient size. COMPARISON: No prior studies available. FINDINGS: Lower Chest: Unremarkable. Liver: Hepatic fatty infiltration. Spleen: Unremarkable. Pancreas: Unremarkable. Gallbladder: No calcified stones. Biliary: Nondilated. Adrenal glands: Normal. Kidneys: Unremarkable without hydronephrosis. Bowel: No obstruction or acute inflammation. Normal appendix. Lymph nodes: No adenopathy. Vasculature: Unremarkable. Peritoneum: Unremarkable without ascites. Musculoskeletal: No acute or destructive process. Pelvis: No adenopathy or free fluid.     1.  Hepatic steatosis. 2.  No acute inflammatory abnormality within the abdomen or pelvis.    CT-HEAD W/O    Result Date: 12/27/2022 12/27/2022 1:42 PM HISTORY/REASON FOR EXAM:  Mental status change, unknown cause. Chemotherapy TECHNIQUE/EXAM DESCRIPTION AND NUMBER OF VIEWS: CT of the head without contrast. The study was performed on a helical multidetector CT scanner. Contiguous axial sections were obtained from the skull base through the vertex. Up to date radiation dose reduction adjustments have been utilized to meet ALARA standards for radiation dose reduction. COMPARISON:  None available FINDINGS: The calvariae and skull base are unremarkable. There are no extraaxial fluid collections. The ventricular system and basal cisterns are within normal limits. There are  no areas of abnormal density in the brain substance. There are no hemorrhagic lesions.  There are no mass effects or shift of midline structures. The brainstem and posterior fossa structures are unremarkable. Paranasal sinuses in the field of view are unremarkable. Mastoids in the field of view are unremarkable.     Head CT without contrast within normal limits. No evidence of acute cerebral infarction, hemorrhage or mass lesion.     CT-MAXILLOFACIAL W/O PLUS RECONS    Result Date: 12/27/2022 12/27/2022 1:42 PM HISTORY/REASON FOR EXAM:  Maxillofacial pain. Blood in the mouth. TECHNIQUE/EXAMD DESCRIPTION AND NUMBER OF VIEWS: CT scan maxillofacial without contrast, with reconstructions. Thin-section helical imaging was obtained of the maxillofacial structures from the orbital roofs through the mandible. Coronal and sagittal multiplanar volume reformat images were generated from the axial data. Low dose optimization technique was utilized for this CT exam including automated exposure control and adjustment of the mA and/or kV according to patient size. COMPARISON: None. FINDINGS: The maxillofacial and orbital bony structures are unremarkable. The paranasal sinuses are normally developed. There is a small retention cyst at the alveolar recess of the right maxillary sinus. There are no air-fluid levels. The mastoids and middle ear cavities are clear. There is no significant cervical adenopathy in the field-of-view.     1.  Essentially negative maxillofacial sinus CT. Small retention cyst in the alveolar recess of the right maxillary sinus of no clinical significance.    DX-CHEST-PORTABLE (1 VIEW)    Result Date: 12/29/2022 12/29/2022 1:08 AM HISTORY/REASON FOR EXAM:  Shortness of Breath. TECHNIQUE/EXAM DESCRIPTION AND NUMBER OF VIEWS: Single portable view of the chest. COMPARISON: 12/28/2022 1:53 AM FINDINGS: Cardiomediastinal contour is unchanged. Previously described pulmonary parenchymal opacities persist. No  pneumothorax identified. Orogastric tube has been removed. RIGHT chest infusion port remains.     1.  Supportive tubing as described above. 2.  No other significant change from prior exam.     DX-CHEST-PORTABLE (1 VIEW)    Result Date: 12/28/2022 12/28/2022 2:11 AM HISTORY/REASON FOR EXAM:  Shortness of Breath. TECHNIQUE/EXAM DESCRIPTION AND NUMBER OF VIEWS: Single portable view of the chest. COMPARISON: 12/27/2022 FINDINGS: Cardiomediastinal contour is unchanged. Previously described pulmonary parenchymal opacities persist. No pneumothorax identified. Supportive tubing is unchanged.     No significant change from prior exam.    DX-CHEST-PORTABLE (1 VIEW)    Result Date: 12/28/2022 12/27/2022 11:46 PM HISTORY/REASON FOR EXAM:  Shortness of Breath. TECHNIQUE/EXAM DESCRIPTION AND NUMBER OF VIEWS: Single portable view of the chest. COMPARISON: 12/27/2022 FINDINGS: Cardiac mediastinal contour is unchanged. Lungs show hypoinflation. Ill-defined bilateral perihilar opacities, new from prior exam. Pulmonary vessels are prominent. No pleural fluid collection or pneumothorax. Orogastric tube tip at the proximal stomach. RIGHT chest infusion port present.     Worsening hypoinflation and bilateral perihilar infiltrates as well as prominence of the pulmonary vasculature suggesting pulmonary edema.  Superimposed pneumonia is not excluded.    DX-CHEST-PORTABLE (1 VIEW)    Result Date: 12/27/2022 12/27/2022 2:09 PM HISTORY/REASON FOR EXAM: Chest pain TECHNIQUE/EXAM DESCRIPTION AND NUMBER OF VIEWS: Single portable view of the chest. COMPARISON: 8/29/2022 FINDINGS: There is a right subclavian Port-A-Cath. There is no evidence of focal consolidation or evidence of pulmonary edema. There is no pleural effusion. The heart is normal in size.     No evidence of acute cardiopulmonary process.    US-EXTREMITY VENOUS LOWER BILAT    Result Date: 12/27/2022   Vascular Laboratory  CONCLUSIONS  No deep venous thrombosis.  Distal subcutaneous  fat edema.  CHEEMA, REBECATARSHA LUCAS  Exam Date:     2022 17:13  Room #:     Inpatient  Priority:     Stat  Ht (in):             Wt (lb):  Ordering Physician:        SANDI IGLESIAS  Referring Physician:       [id number]  Sonographer:               Palma Churchill RVT  Study Type:                Complete Bilateral  Technical Quality:         Adequate  Age:    22    Gender:     M  MRN:    [id number]  :    2000      BSA:  Indications:     Localized swelling, mass and lump, lower limb, bilateral  CPT Codes:       97681  ICD Codes:       R22.43  History:         Swelling/edema of legs, concern for pulmonary embolus. Pain.                     No prior duplex.  Limitations:  PROCEDURES:  Bilateral lower extremity venous duplex imaging.  The following venous structures were evaluated: common femoral, deep  femoral, proximal great saphenous, femoral, popliteal, peroneal, and  posterior tibial veins.  Serial compression, color, and spectral Doppler flow evaluations were  performed.  FINDINGS:  Bilateral lower extremities.  No deep venous thrombosis.  All veins demonstrate complete color filling and compressibility with  normal venous flow dynamics including spontaneous flow and respiratory  phasicity.  Interstitial fluid consistent with edema is observed below the knee.  Dorian Knowles  (Electronically Signed)  Final Date:      2022                   20:46    EC-ECHOCARDIOGRAM LTD W/O CONT    Result Date: 2022  Transthoracic Echo Report Echocardiography Laboratory CONCLUSIONS The left ventricular ejection fraction is visually estimated to be 65%. Normal regional wall motion. CHEEMA, REBECATARSHA LUCAS Exam Date:         2022                    17:50 Exam Location:     Inpatient Priority:          Routine Ordering Physician:        SANDI IGLESIAS Referring Physician: Sonographer:               Tigist INTERIANO Age:    22     Gender:    M MRN:    [id number] :    2000 BSA:     "1.82   Ht (in):    65     Wt (lb):    165 Exam Type:     Limited Indications:     Shortness of breath ICD Codes:       786.05 CPT Codes:       45202 BP:   128    /   57     HR:   160 Technical Quality:       Technically difficult study                          incomplete information is                          obtained MEASUREMENTS  (Male / Female) Normal Values 2D ECHO Estimated LV Ejection Fraction    65 %                  * Indicates values subject to auto-interpretation LV EF:  65    % FINDINGS Left Ventricle Normal left ventricular chamber size. Normal left ventricular systolic function. The left ventricular ejection fraction is visually estimated to be 65%. Normal regional wall motion. Diastolic function is difficult to assess with arrhythmia. Right Ventricle The right ventricle is not well visualized. Right Atrium The right atrium is not well visualized. Left Atrium The left atrium is not well visualized. Mitral Valve The mitral valve is not well visualized. Aortic Valve The aortic valve is not well visualized. Tricuspid Valve The tricuspid valve is not well visualized. Pulmonic Valve The pulmonic valve is not well visualized. Pericardium Normal pericardium without effusion. Aorta The ascending aorta is not well visualized. Mayito Howell MD (Electronically Signed) Final Date:     27 December 2022                 18:38      Micro:  Results       Procedure Component Value Units Date/Time    BLOOD CULTURE [[id number]] Collected: 12/30/22 1015    Order Status: Completed Specimen: Blood from Peripheral Updated: 12/31/22 0732     Significant Indicator NEG     Source BLD     Site PERIPHERAL     Culture Result No Growth  Note: Blood cultures are incubated for 5 days and  are monitored continuously.Positive blood cultures  are called to the RN and reported as soon as  they are identified.      Narrative:      Per Hospital Policy: Only change Specimen Src: to \"Line\" if  specified by physician order.  Right " "Forearm/Arm    BLOOD CULTURE [[id number]] Collected: 12/30/22 1045    Order Status: Completed Specimen: Blood from Peripheral Updated: 12/31/22 0732     Significant Indicator NEG     Source BLD     Site PERIPHERAL     Culture Result No Growth  Note: Blood cultures are incubated for 5 days and  are monitored continuously.Positive blood cultures  are called to the RN and reported as soon as  they are identified.      Narrative:      Per Hospital Policy: Only change Specimen Src: to \"Line\" if  specified by physician order.  Right Forearm/Arm    BLOOD CULTURE [[id number]]  (Abnormal) Collected: 12/27/22 1410    Order Status: Completed Specimen: Blood from Peripheral Updated: 12/29/22 0806     Significant Indicator POS     Source BLD     Site PERIPHERAL     Culture Result Growth detected by Bactec instrument. 12/27/2022  21:19      Escherichia coli  See previous culture for sensitivity report.        Klebsiella pneumoniae  See previous culture for sensitivity report.      Narrative:      CALL  Jenkins  161 tel. [phone],  CALLED  161 tel. [phone] 12/27/2022, 21:25, RB PERF. RESULTS CALLED TO: RN  [phone]  Per Hospital Policy: Only change Specimen Src: to \"Line\" if  specified by physician order.  Left AC    BLOOD CULTURE [[id number]]  (Abnormal)  (Susceptibility) Collected: 12/27/22 1345    Order Status: Completed Specimen: Blood from Peripheral Updated: 12/29/22 0806     Significant Indicator POS     Source BLD     Site PERIPHERAL     Culture Result Growth detected by Bactec instrument. 12/27/2022  21:20      Escherichia coli      Klebsiella pneumoniae    Narrative:      CALL  Jenkins  161 tel. [phone],  CALLED  161 tel. [phone] 12/27/2022, 21:24, RB PERF. RESULTS CALLED TO: RN  [phone]  Per Hospital Policy: Only change Specimen Src: to \"Line\" if  specified by physician order.  No site indicated    Susceptibility       Escherichia coli (1)       Antibiotic Interpretation Microscan   Method Status    Ampicillin Sensitive " <=8 mcg/mL COLTEN Final    Ceftriaxone Sensitive <=1 mcg/mL COLTEN Final    Cefazolin Sensitive <=2 mcg/mL COLTEN Final    Ciprofloxacin Sensitive <=0.25 mcg/mL COLTEN Final    Cefepime Sensitive <=2 mcg/mL COLTEN Final    Cefuroxime Sensitive <=4 mcg/mL COLTEN Final    Ampicillin/sulbactam Sensitive <=4/2 mcg/mL COLTEN Final    Ertapenem Sensitive <=0.5 mcg/mL COLTEN Final    Tobramycin Intermediate 8 mcg/mL COLTEN Final    Gentamicin Sensitive <=2 mcg/mL COLTEN Final    Minocycline Sensitive <=4 mcg/mL COLTEN Final    Moxifloxacin Sensitive <=2 mcg/mL COLTEN Final    Pip/Tazobactam Sensitive <=8 mcg/mL COLTEN Final    Trimeth/Sulfa Resistant >2/38 mcg/mL COLTEN Final    Tigecycline Sensitive <=2 mcg/mL COLTEN Final              Klebsiella pneumoniae (2)       Antibiotic Interpretation Microscan   Method Status    Ceftriaxone Sensitive <=1 mcg/mL COLTEN Final    Cefazolin Sensitive <=2 mcg/mL COLTEN Final    Ciprofloxacin Sensitive <=0.25 mcg/mL COLTEN Final    Cefepime Sensitive <=2 mcg/mL COLTEN Final    Cefuroxime Sensitive <=4 mcg/mL COLTEN Final    Ampicillin/sulbactam Sensitive <=4/2 mcg/mL COLTEN Final    Ertapenem Sensitive <=0.5 mcg/mL COLTEN Final    Tobramycin Intermediate 8 mcg/mL COLTEN Final    Gentamicin Sensitive <=2 mcg/mL COLTEN Final    Minocycline Intermediate 8 mcg/mL COLTEN Final    Moxifloxacin Sensitive <=2 mcg/mL COLTEN Final    Pip/Tazobactam Sensitive <=8 mcg/mL COLTEN Final    Trimeth/Sulfa Resistant >2/38 mcg/mL COLTEN Final    Tigecycline Sensitive <=2 mcg/mL COLTEN Final                       URINE CULTURE(NEW) [[id number]] Collected: 12/27/22 1512    Order Status: Completed Specimen: Urine Updated: 12/29/22 0730     Significant Indicator NEG     Source UR     Site -     Culture Result No growth at 48 hours.    Narrative:      Indication for culture:->Emergency Room Patient  Indication for culture:->Emergency Room Patient    S. Aureus By PCR, Nasal Complete [[id number]] Collected: 12/27/22 2105    Order Status: Completed Specimen: Respirate from Respiratory Updated:  12/28/22 0109     Staph aureus by PCR Negative     MRSA by PCR Negative    Narrative:      Collected By: [id number] JASON DELATORRE    CoV-2, FLU A/B, and RSV by PCR (2-4 Hours CEPSinimanesID) : Collect NP swab in VTM [[id number]] Collected: 12/27/22 1500    Order Status: Completed Specimen: Respirate Updated: 12/27/22 1639     Influenza virus A RNA Negative     Influenza virus B, PCR Negative     RSV, PCR Negative     SARS-CoV-2 by PCR NotDetected     Comment: [facility] providers: PLEASE REFER TO DE-ESCALATION AND RETESTING PROTOCOL  on [email or web address]    **The [employer] GeneXpert Xpress SARS-CoV-2 RT-PCR Test has been made  available for use under the Emergency Use Authorization (EUA) only.          SARS-CoV-2 Source NP Swab    URINALYSIS [[id number]]  (Abnormal) Collected: 12/27/22 1512    Order Status: Completed Specimen: Urine Updated: 12/27/22 1544     Color Yellow     Character Clear     Specific Gravity >=1.045     Ph 5.0     Glucose Negative mg/dL      Ketones Negative mg/dL      Protein Negative mg/dL      Bilirubin Negative     Urobilinogen, Urine 0.2     Nitrite Negative     Leukocyte Esterase Negative     Occult Blood Trace     Micro Urine Req Microscopic    Narrative:      Indication for culture:->Emergency Room Patient            Assessment:  Active Hospital Problems    Diagnosis     *Shock (HCC) [R57.9]     Gram-negative bacteremia [R78.81]     DIC (disseminated intravascular coagulation) (Former[facility]) [D65]     At high risk for venous thromboembolism (VTE) [Z91.89]     Acute kidney injury (HCC) [N17.9]     High anion gap metabolic acidosis [E87.29]     Lactic acid acidosis [E87.20]     Hyperglycemia [R73.9]     Neutropenic sepsis (HCC) [A41.9, D70.9]     Upper GI bleed [K92.2]     Hyperkalemia [E87.5]     Hypocalcemia [E83.51]     Elevated troponin [R77.8]     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   Do not change to ceftriaxone  Repeat blood cultures neg at 48 hours  Transfusions and electrolyte replacement as needed  Will need explantation port once stabilized as likely infected-possibly next week  GCSF per oncology  Plan to resume Bactrim for PJP prophylaxis Mon  High risk for additional bleeding  Holding chemo as long as feasible  Final antibiotic recommendations per culture results, resolution neutropenia, and clinical course

## 2023-01-01 NOTE — ASSESSMENT & PLAN NOTE
Follows outpatient pediatric oncology.  Currently undergoing chemotherapy, last treatment was 12/20/2022.  Oncology is following.

## 2023-01-01 NOTE — ASSESSMENT & PLAN NOTE
Blood cultures grew E. coli and Klebsiella.  Urine culture from 12/27/2022 was negative.  On cefepime.  ID following.

## 2023-01-01 NOTE — ASSESSMENT & PLAN NOTE
Secondary to gram-negative bacteremia.  On cefepime.  ID following.  Repeat cultures from 12/30/2022 are negative.

## 2023-01-02 ENCOUNTER — APPOINTMENT (OUTPATIENT)
Dept: RADIOLOGY | Facility: MEDICAL CENTER | Age: 23
DRG: 871 | End: 2023-01-02
Attending: INTERNAL MEDICINE
Payer: COMMERCIAL

## 2023-01-02 PROBLEM — E87.5 HYPERKALEMIA: Status: RESOLVED | Noted: 2022-12-27 | Resolved: 2023-01-02

## 2023-01-02 LAB
ALBUMIN SERPL BCP-MCNC: 2 G/DL (ref 3.2–4.9)
ALBUMIN/GLOB SERPL: 0.9 G/DL
ALP SERPL-CCNC: 155 U/L (ref 30–99)
ALT SERPL-CCNC: 71 U/L (ref 2–50)
ANION GAP SERPL CALC-SCNC: 5 MMOL/L (ref 7–16)
ANION GAP SERPL CALC-SCNC: 6 MMOL/L (ref 7–16)
ANISOCYTOSIS BLD QL SMEAR: ABNORMAL
ANISOCYTOSIS BLD QL SMEAR: ABNORMAL
AST SERPL-CCNC: 18 U/L (ref 12–45)
BARCODED ABORH UBTYP: 6200
BARCODED PRD CODE UBPRD: NORMAL
BARCODED UNIT NUM UBUNT: NORMAL
BASOPHILS # BLD AUTO: 0 % (ref 0–1.8)
BASOPHILS # BLD AUTO: 0 % (ref 0–1.8)
BASOPHILS # BLD: 0 K/UL (ref 0–0.12)
BASOPHILS # BLD: 0 K/UL (ref 0–0.12)
BILIRUB SERPL-MCNC: 0.5 MG/DL (ref 0.1–1.5)
BUN SERPL-MCNC: 12 MG/DL (ref 8–22)
BUN SERPL-MCNC: 6 MG/DL (ref 8–22)
CA-I SERPL-SCNC: 1.1 MMOL/L (ref 1.1–1.3)
CALCIUM ALBUM COR SERPL-MCNC: 9.2 MG/DL (ref 8.5–10.5)
CALCIUM SERPL-MCNC: 7.6 MG/DL (ref 8.5–10.5)
CALCIUM SERPL-MCNC: 7.7 MG/DL (ref 8.5–10.5)
CHLORIDE SERPL-SCNC: 101 MMOL/L (ref 96–112)
CHLORIDE SERPL-SCNC: 99 MMOL/L (ref 96–112)
CO2 SERPL-SCNC: 28 MMOL/L (ref 20–33)
CO2 SERPL-SCNC: 29 MMOL/L (ref 20–33)
COMPONENT P 8504P: NORMAL
CREAT SERPL-MCNC: 0.28 MG/DL (ref 0.5–1.4)
CREAT SERPL-MCNC: 0.3 MG/DL (ref 0.5–1.4)
DOHLE BOD BLD QL SMEAR: NORMAL
EOSINOPHIL # BLD AUTO: 0 K/UL (ref 0–0.51)
EOSINOPHIL # BLD AUTO: 0 K/UL (ref 0–0.51)
EOSINOPHIL NFR BLD: 0 % (ref 0–6.9)
EOSINOPHIL NFR BLD: 0 % (ref 0–6.9)
ERYTHROCYTE [DISTWIDTH] IN BLOOD BY AUTOMATED COUNT: 50.7 FL (ref 35.9–50)
ERYTHROCYTE [DISTWIDTH] IN BLOOD BY AUTOMATED COUNT: 52.1 FL (ref 35.9–50)
GFR SERPLBLD CREATININE-BSD FMLA CKD-EPI: 173 ML/MIN/1.73 M 2
GFR SERPLBLD CREATININE-BSD FMLA CKD-EPI: 177 ML/MIN/1.73 M 2
GLOBULIN SER CALC-MCNC: 2.2 G/DL (ref 1.9–3.5)
GLUCOSE BLD STRIP.AUTO-MCNC: 129 MG/DL (ref 65–99)
GLUCOSE BLD STRIP.AUTO-MCNC: 82 MG/DL (ref 65–99)
GLUCOSE SERPL-MCNC: 126 MG/DL (ref 65–99)
GLUCOSE SERPL-MCNC: 99 MG/DL (ref 65–99)
HCT VFR BLD AUTO: 23.5 % (ref 42–52)
HCT VFR BLD AUTO: 23.6 % (ref 42–52)
HGB BLD-MCNC: 7.9 G/DL (ref 14–18)
HGB BLD-MCNC: 8 G/DL (ref 14–18)
LYMPHOCYTES # BLD AUTO: 0.16 K/UL (ref 1–4.8)
LYMPHOCYTES # BLD AUTO: 0.3 K/UL (ref 1–4.8)
LYMPHOCYTES NFR BLD: 25.9 % (ref 22–41)
LYMPHOCYTES NFR BLD: 30.1 % (ref 22–41)
MAGNESIUM SERPL-MCNC: 1.6 MG/DL (ref 1.5–2.5)
MANUAL DIFF BLD: ABNORMAL
MANUAL DIFF BLD: NORMAL
MCH RBC QN AUTO: 28.3 PG (ref 27–33)
MCH RBC QN AUTO: 28.6 PG (ref 27–33)
MCHC RBC AUTO-ENTMCNC: 33.5 G/DL (ref 33.7–35.3)
MCHC RBC AUTO-ENTMCNC: 34 G/DL (ref 33.7–35.3)
MCV RBC AUTO: 83.9 FL (ref 81.4–97.8)
MCV RBC AUTO: 84.6 FL (ref 81.4–97.8)
METAMYELOCYTES NFR BLD MANUAL: 0.9 %
METAMYELOCYTES NFR BLD MANUAL: 3.4 %
MICROCYTES BLD QL SMEAR: ABNORMAL
MICROCYTES BLD QL SMEAR: ABNORMAL
MONOCYTES # BLD AUTO: 0.04 K/UL (ref 0–0.85)
MONOCYTES # BLD AUTO: 0.08 K/UL (ref 0–0.85)
MONOCYTES NFR BLD AUTO: 6 % (ref 0–13.4)
MONOCYTES NFR BLD AUTO: 8 % (ref 0–13.4)
MORPHOLOGY BLD-IMP: NORMAL
MORPHOLOGY BLD-IMP: NORMAL
MYELOCYTES NFR BLD MANUAL: 1.7 %
MYELOCYTES NFR BLD MANUAL: 2.6 %
NEUTROPHILS # BLD AUTO: 0.37 K/UL (ref 1.82–7.42)
NEUTROPHILS # BLD AUTO: 0.56 K/UL (ref 1.82–7.42)
NEUTROPHILS NFR BLD: 50.9 % (ref 44–72)
NEUTROPHILS NFR BLD: 54 % (ref 44–72)
NEUTS BAND NFR BLD MANUAL: 1.8 % (ref 0–10)
NEUTS BAND NFR BLD MANUAL: 11.2 % (ref 0–10)
NRBC # BLD AUTO: 0.04 K/UL
NRBC # BLD AUTO: 0.06 K/UL
NRBC BLD-RTO: 6.3 /100 WBC
NRBC BLD-RTO: 6.7 /100 WBC
OVALOCYTES BLD QL SMEAR: NORMAL
PHOSPHATE SERPL-MCNC: 2.4 MG/DL (ref 2.5–4.5)
PLATELET # BLD AUTO: 12 K/UL (ref 164–446)
PLATELET # BLD AUTO: 22 K/UL (ref 164–446)
PLATELET BLD QL SMEAR: NORMAL
PLATELET BLD QL SMEAR: NORMAL
PLATELETS.RETICULATED NFR BLD AUTO: 8.8 K/UL (ref 0.6–13.1)
PMV BLD AUTO: 12 FL (ref 9–12.9)
POIKILOCYTOSIS BLD QL SMEAR: NORMAL
POLYCHROMASIA BLD QL SMEAR: NORMAL
POTASSIUM SERPL-SCNC: 3.5 MMOL/L (ref 3.6–5.5)
POTASSIUM SERPL-SCNC: 3.7 MMOL/L (ref 3.6–5.5)
PRODUCT TYPE UPROD: NORMAL
PROMYELOCYTES NFR BLD MANUAL: 2.6 %
PROT SERPL-MCNC: 4.2 G/DL (ref 6–8.2)
RBC # BLD AUTO: 2.79 M/UL (ref 4.7–6.1)
RBC # BLD AUTO: 2.8 M/UL (ref 4.7–6.1)
RBC BLD AUTO: PRESENT
RBC BLD AUTO: PRESENT
SODIUM SERPL-SCNC: 133 MMOL/L (ref 135–145)
SODIUM SERPL-SCNC: 135 MMOL/L (ref 135–145)
TOXIC GRANULES BLD QL SMEAR: NORMAL
UNIT STATUS USTAT: NORMAL
WBC # BLD AUTO: 0.6 K/UL (ref 4.8–10.8)
WBC # BLD AUTO: 1 K/UL (ref 4.8–10.8)
WBC OTHER NFR BLD MANUAL: 0.9 %

## 2023-01-02 PROCEDURE — A9270 NON-COVERED ITEM OR SERVICE: HCPCS | Performed by: HOSPITALIST

## 2023-01-02 PROCEDURE — 700105 HCHG RX REV CODE 258: Performed by: INTERNAL MEDICINE

## 2023-01-02 PROCEDURE — 700111 HCHG RX REV CODE 636 W/ 250 OVERRIDE (IP): Performed by: STUDENT IN AN ORGANIZED HEALTH CARE EDUCATION/TRAINING PROGRAM

## 2023-01-02 PROCEDURE — 84100 ASSAY OF PHOSPHORUS: CPT

## 2023-01-02 PROCEDURE — 700105 HCHG RX REV CODE 258: Performed by: PEDIATRICS

## 2023-01-02 PROCEDURE — 700111 HCHG RX REV CODE 636 W/ 250 OVERRIDE (IP): Performed by: PEDIATRICS

## 2023-01-02 PROCEDURE — 86945 BLOOD PRODUCT/IRRADIATION: CPT

## 2023-01-02 PROCEDURE — 36430 TRANSFUSION BLD/BLD COMPNT: CPT

## 2023-01-02 PROCEDURE — 85007 BL SMEAR W/DIFF WBC COUNT: CPT

## 2023-01-02 PROCEDURE — 85025 COMPLETE CBC W/AUTO DIFF WBC: CPT | Mod: 91

## 2023-01-02 PROCEDURE — 700102 HCHG RX REV CODE 250 W/ 637 OVERRIDE(OP): Performed by: HOSPITALIST

## 2023-01-02 PROCEDURE — 770004 HCHG ROOM/CARE - ONCOLOGY PRIVATE *

## 2023-01-02 PROCEDURE — 80053 COMPREHEN METABOLIC PANEL: CPT

## 2023-01-02 PROCEDURE — 71045 X-RAY EXAM CHEST 1 VIEW: CPT

## 2023-01-02 PROCEDURE — 80048 BASIC METABOLIC PNL TOTAL CA: CPT

## 2023-01-02 PROCEDURE — 82330 ASSAY OF CALCIUM: CPT

## 2023-01-02 PROCEDURE — 85055 RETICULATED PLATELET ASSAY: CPT

## 2023-01-02 PROCEDURE — P9034 PLATELETS, PHERESIS: HCPCS

## 2023-01-02 PROCEDURE — 700111 HCHG RX REV CODE 636 W/ 250 OVERRIDE (IP): Performed by: INTERNAL MEDICINE

## 2023-01-02 PROCEDURE — 99232 SBSQ HOSP IP/OBS MODERATE 35: CPT | Performed by: PEDIATRICS

## 2023-01-02 PROCEDURE — 83735 ASSAY OF MAGNESIUM: CPT

## 2023-01-02 PROCEDURE — 82962 GLUCOSE BLOOD TEST: CPT | Mod: 91

## 2023-01-02 RX ORDER — SODIUM CHLORIDE 9 MG/ML
INJECTION, SOLUTION INTRAVENOUS CONTINUOUS
Status: DISCONTINUED | OUTPATIENT
Start: 2023-01-02 | End: 2023-01-04

## 2023-01-02 RX ORDER — FUROSEMIDE 10 MG/ML
10 INJECTION INTRAMUSCULAR; INTRAVENOUS ONCE
Status: COMPLETED | OUTPATIENT
Start: 2023-01-02 | End: 2023-01-03

## 2023-01-02 RX ORDER — HYDROMORPHONE HYDROCHLORIDE 1 MG/ML
1 INJECTION, SOLUTION INTRAMUSCULAR; INTRAVENOUS; SUBCUTANEOUS
Status: DISCONTINUED | OUTPATIENT
Start: 2023-01-02 | End: 2023-01-03

## 2023-01-02 RX ORDER — HYDROMORPHONE HYDROCHLORIDE 1 MG/ML
0.5 INJECTION, SOLUTION INTRAMUSCULAR; INTRAVENOUS; SUBCUTANEOUS
Status: DISCONTINUED | OUTPATIENT
Start: 2023-01-02 | End: 2023-01-03

## 2023-01-02 RX ADMIN — SODIUM CHLORIDE: 9 INJECTION, SOLUTION INTRAVENOUS at 11:47

## 2023-01-02 RX ADMIN — HYDROMORPHONE HYDROCHLORIDE 1 MG: 1 INJECTION, SOLUTION INTRAMUSCULAR; INTRAVENOUS; SUBCUTANEOUS at 08:18

## 2023-01-02 RX ADMIN — HYDROMORPHONE HYDROCHLORIDE 0.6 MG: 1 INJECTION, SOLUTION INTRAMUSCULAR; INTRAVENOUS; SUBCUTANEOUS at 01:55

## 2023-01-02 RX ADMIN — HYDROMORPHONE HYDROCHLORIDE 1 MG: 1 INJECTION, SOLUTION INTRAMUSCULAR; INTRAVENOUS; SUBCUTANEOUS at 17:11

## 2023-01-02 RX ADMIN — CEFEPIME 2 G: 2 INJECTION, POWDER, FOR SOLUTION INTRAVENOUS at 21:54

## 2023-01-02 RX ADMIN — HYDROMORPHONE HYDROCHLORIDE 0.5 MG: 1 INJECTION, SOLUTION INTRAMUSCULAR; INTRAVENOUS; SUBCUTANEOUS at 21:53

## 2023-01-02 RX ADMIN — OMEPRAZOLE 40 MG: 20 CAPSULE, DELAYED RELEASE ORAL at 05:25

## 2023-01-02 RX ADMIN — HYDROMORPHONE HYDROCHLORIDE 1 MG: 1 INJECTION, SOLUTION INTRAMUSCULAR; INTRAVENOUS; SUBCUTANEOUS at 11:47

## 2023-01-02 RX ADMIN — CEFEPIME 2 G: 2 INJECTION, POWDER, FOR SOLUTION INTRAVENOUS at 05:25

## 2023-01-02 RX ADMIN — CEFEPIME 2 G: 2 INJECTION, POWDER, FOR SOLUTION INTRAVENOUS at 13:35

## 2023-01-02 RX ADMIN — CALCIUM CARBONATE 1000 MG: 500 TABLET, CHEWABLE ORAL at 17:11

## 2023-01-02 RX ADMIN — HYDROMORPHONE HYDROCHLORIDE 0.6 MG: 1 INJECTION, SOLUTION INTRAMUSCULAR; INTRAVENOUS; SUBCUTANEOUS at 05:22

## 2023-01-02 RX ADMIN — HYDROMORPHONE HYDROCHLORIDE 1 MG: 1 INJECTION, SOLUTION INTRAMUSCULAR; INTRAVENOUS; SUBCUTANEOUS at 14:04

## 2023-01-02 RX ADMIN — ACETAMINOPHEN 650 MG: 325 TABLET ORAL at 21:38

## 2023-01-02 RX ADMIN — OMEPRAZOLE 40 MG: 20 CAPSULE, DELAYED RELEASE ORAL at 17:11

## 2023-01-02 RX ADMIN — CALCIUM CARBONATE 1000 MG: 500 TABLET, CHEWABLE ORAL at 05:25

## 2023-01-02 ASSESSMENT — COGNITIVE AND FUNCTIONAL STATUS - GENERAL
EATING MEALS: A LITTLE
HELP NEEDED FOR BATHING: A LOT
MOVING FROM LYING ON BACK TO SITTING ON SIDE OF FLAT BED: A LOT
TURNING FROM BACK TO SIDE WHILE IN FLAT BAD: A LITTLE
DRESSING REGULAR UPPER BODY CLOTHING: A LITTLE
DAILY ACTIVITIY SCORE: 15
PERSONAL GROOMING: A LITTLE
DRESSING REGULAR LOWER BODY CLOTHING: A LOT
TOILETING: A LOT
WALKING IN HOSPITAL ROOM: A LOT
SUGGESTED CMS G CODE MODIFIER DAILY ACTIVITY: CK
STANDING UP FROM CHAIR USING ARMS: A LOT
MOVING TO AND FROM BED TO CHAIR: A LOT
SUGGESTED CMS G CODE MODIFIER MOBILITY: CL
MOBILITY SCORE: 13
CLIMB 3 TO 5 STEPS WITH RAILING: A LOT

## 2023-01-02 ASSESSMENT — PAIN DESCRIPTION - PAIN TYPE
TYPE: ACUTE PAIN

## 2023-01-02 NOTE — PROGRESS NOTES
Pediatric Hematology/Oncology  Daily Progress Note      Patient Name:  Tomas Jean-Baptiste  : 2000  MRN: [id number]    Location of Service:  [facility], [place]  Date of Service: 2023  Time: 8:51 PM    Hospital Day: 6    Protocol / Treatment Plan: [place] Chromosome Precursor B-Cell Acute Lymphoblastic Leukemia, ON STUDY [id number], Delayed Intensification, Day 27    SUBJECTIVE:     Low grade temperature with Tmax of 99.8 F at 1400. Reports feeling very warm. Reports that back pain is improved but leg pain although improving is still present. Requesting to give stronger pain medication. Breathing comfortably and edema much improved, discontinued scheduled lasix. Weaning supplemental oxygen, currently on 1 L. Advancing diet, tried to have solid food yesterday which resulted in diarrhea. Stool appears more dark. Switched to omeprazole from protonix IV. Still requiring platelet transfusion to maintain above threshold. Counts trending up. Continues to be off of vasopressors and stress dose hydrocortisone this am and maintaining adequate BP.  Blotchy erythematous rash on the face and trunk resolved. Continues on cefepime. Repeat blood culture obtained from 2 days ago shows no growth so far. On sliding scale insulin for elevated serum glucose. Sitting up in reclining chair today with the plan to move to CNU if doing well.    Review of Systems:     Constitutional: Sitting up in reclining chair, c/o leg pain, edema improved  HENT: Negative for auditory changes or ear pain, no congestion and rhinorrhea, no sore throat.  No mouth sores. Lips appear more moist with no crusted blood visible.  Eyes: Negative for visual changes.  Respiratory: Off of nasal cannula  Cardiovascular: Positive for leg swelling after fluid resuscitation which is much improved  Gastrointestinal: Negative for nausea, vomiting, abdominal pain, diarrhea, constipation. Dark stool. NG removed  Genitourinary: Negative for  "dysuria.  Musculoskeletal: Positive for back pain which is improving. Leg pain improving  but c/o more of leg pain today  Skin: Positive for blotchy, erythematous rash which is almost resolved  Neurological: Alert and oriented  Endo/Heme/Allergies: Bleeding much improved  Psychiatric/Behavioral: Anxious    OBJECTIVE:     Max Temp: Temp (24hrs), Av.4 °C (97.6 °F), Min:35.8 °C (96.5 °F), Max:37.7 °C (99.9 °F)      Vitals: BP (!) 142/98   Pulse (!) 112   Temp 37.7 °C (99.8 °F) (Temporal)   Resp 14   Ht 1.651 m (5' 5\")   Wt 68.6 kg (151 lb 3.8 oz)   SpO2 97%   BMI 25.17 kg/m²     I/O:   Intake/Output Summary (Last 24 hours) at 2023  Last data filed at 2023 1841  Gross per 24 hour   Intake 1622.55 ml   Output 6150 ml   Net -4527.45 ml         Labs:  Recent Labs     22  2100 23  0500 23  1554   WBC 0.3* 0.4* 0.6*   RBC 3.06* 2.88* 3.06*   HEMOGLOBIN 8.5* 8.1* 8.8*   HEMATOCRIT 25.3* 23.9* 25.6*   MCV 82.7 83.0 83.7   MCH 27.8 28.1 28.8   RDW 51.3* 51.6* 51.0*   PLATELETCT 27* 52* 30*   MPV 9.8 10.4 9.3       Recent Labs     22  2100 23  0500 23  1554   SODIUM 134* 133* 134*   POTASSIUM 3.5* 3.7 3.8   CHLORIDE 98 99 99   CO2 31 30 30   GLUCOSE 178* 129* 114*   BUN 12 13 13           Component 3 d ago    Significant Indicator POS Positive (POS)    Source BLD    Site PERIPHERAL    Culture Result Growth detected by Bactec instrument. 2022  21:20 Abnormal     Culture Result Escherichia coli Abnormal     Culture Result Klebsiella pneumoniae Abnormal               CXR: 2022  There worsening bilateral perihilar infiltrates.  There is no pleural effusion.  The heart is enlarged.  There is a right subclavian Port-A-Cath.      Repeat Blood culture 2022: No growth      Physical Exam:    Constitutional: Much improved, pallor improved. Edema improved  HENT: Normocephalic and atraumatic. Alopecia.  No rhinorrhea. Lips more moist, previously noted crusted blood " not seen  Eyes: Conjunctivae are normal. EOMI.   Neck: Normal range of motion of neck, no adenopathy.    Cardiovascular: HR much improved, regular rhythm. DP/radial pulses 2+, cap refill < 2 sec  Pulmonary/Chest: Effort normall. No respiratory distress. Symmetric expansion.  No crackles or wheezes.  Abdomen: Soft. Bowel sounds are normal. No distension and no mass. There is no hepatosplenomegaly.   Genitourinary:  Condom cath removed  Musculoskeletal: Not assessed  Neurological: Alert and oriented to person and place.   Skin: Blotchy, erythematous rash on the face and trunk almost resolved. Port R chest wall, dressing c/d/i  Psychiatric : Anxious    ASSESSMENT AND PLAN:     Tomas Jean-Baptiste is a 22 y.o. male with [place] Chromosome Precursor B-Cell Acute Lymphoblastic Leukemia who is receiving therapy per protocol [id number], on study [facility] Department with gram negative septic shock and DIC. Culture now growing (within 7 hrs of drawing blood culture) E.Coli and Klebsiella. Repeat blood culture showing no growth. Remains on Cefepime. Critically ill initially requiring intensive care but now much improved with a plan to transfer to CNU today.     E.coli and Klebsiella sepsis/septic shock in an immunocompromised host:  - Blood culture from 12/27/2022: E.coli and Klebsiella  - Susceptible to cefepime, continue  - Currently off of pressors and stress dose hydrocortisone  - Repeat blood culture obtained 12/20/2022: Pending  - ID consulted for further input, they recommend removal of port when stable     Ph+ Precursor B-Cell Acute Lymphoblastic Leukemia, in MRD Remission: Receiving therapy   TAT [id number], AR Arm B, Delayed Intensification, Day 27  - HOLD imatinib given patient critically ill  - Not due for any chemotherapy until Day 29 which will be delayed to provide time for complete recovery  - Patient's clinical status much improved and counts recovering,  no  need to start G-CSF or granulocyte infusion  - HOLD apixaban that was started on Day 8 to prevent pegaspargase induced clots.  - HOLD Bactrim (for PJP prophylaxis) this weekend given patient severely myelosuppressed. Restart next weekend or when ready.     Therapy related pancytopenia: Total white cell count trending up  - Transfuse to maintain hemoglobin >7 gm/dL or with symptoms  - Transfuse to maintain platelets >30,000/microliters, however can decrease to 10,000/microliters if hematochezia resolved.  - Use irradiated/leukoreduced products  - Obtain CBC with differential tomorrow     DIC secondary to septic shock: Resolved  - S/p FFP and cryoprecipitate   - Fibrinogen level elevated (acute phase reactant?), PTT/PT normalized  - Bloody emesis/stool resolved  - Continue omeprazole for gastritis    Blotchy erythematous rash: resolved  - Likely multifactorial: fever, transfusion related, engraftment?  - Will continue to monitor     Rest of the management per ICU team. Discussed with ICU attending, patient and bedside nurse. Patient improving.OK to transfer to CNU. Will require physical therapy consult to help regain strength.         Alyce Duenas MD  Pediatric Hematology / Oncology  [facility]  Cell.  [phone]  [facility]. [phone]

## 2023-01-02 NOTE — CARE PLAN
The patient is Watcher - Medium risk of patient condition declining or worsening    Shift Goals  Clinical Goals: maintain hemodynamic stability, pain relief, mobilize  Patient Goals: comfort, rest  Family Goals: wants pt to be comfortable and to rest    Progress made toward(s) clinical / shift goals:    Problem: Knowledge Deficit - Standard  Goal: Patient and family/care givers will demonstrate understanding of plan of care, disease process/condition, diagnostic tests and medications  Outcome: Progressing     Problem: Skin Integrity  Goal: Skin integrity is maintained or improved  Outcome: Progressing     Problem: Fall Risk  Goal: Patient will remain free from falls  Outcome: Progressing     Problem: Psychosocial  Goal: Patient's level of anxiety will decrease  Outcome: Progressing     Problem: Communication  Goal: The ability to communicate needs accurately and effectively will improve  Outcome: Progressing     Problem: Infection - Standard  Goal: Patient will remain free from infection  Outcome: Progressing       Patient is not progressing towards the following goals:

## 2023-01-02 NOTE — PROGRESS NOTES
Pt heart rate sustaining 135, pt denies palpitations or SOB. Oxygen saturation at 96% on 1 L. Updated Dr. Duenas, received orders for continuous telemetry monitoring and IVF at 50 ml/hr.

## 2023-01-02 NOTE — PROGRESS NOTES
Pediatric Hematology/Oncology  Daily Progress Note      Patient Name:  Tomas Jean-Baptiste  : 2000  MRN: [id number]    Location of Service: Cancer Nursing Unit  Date of Service: 2023  Time: 3:49 PM    Hospital Day: 7    Protocol / Treatment Plan:  [place] Chromosome Precursor B-Cell Acute Lymphoblastic Leukemia, ON STUDY [id number], Delayed Intensification, Day 28    SUBJECTIVE:     Low grade fever with Tmax of 99.8 F at 1200. HR increased today. Maintaining BP off of vasopressors and hydrocortisone. Still complaining of bilateral leg pain and requiring dilaudid every 3-4 hrs. Not ambulating because of leg pain. Breathing comfortably and edema much improved, discontinued scheduled lasix. Not on any supplemental oxygen anymore. Advancing diet, tried to have solid food 2 days ago which resulted in diarrhea but trying to eat rice today. Stool appears more dark brown. Counts trending up. Continues on cefepime. Repeat blood culture obtained from 3 days ago shows no growth so far.      Review of Systems:     Constitutional: Laying in bed, c/o leg pain, edema improved  HENT: Negative for auditory changes or ear pain, no congestion and rhinorrhea, no sore throat.  No mouth sores. Lips appear more moist with no crusted blood visible.  Eyes: Negative for visual changes.  Respiratory: Off of nasal cannula  Cardiovascular: Positive for leg swelling after fluid resuscitation which is much improved  Gastrointestinal: Negative for nausea, vomiting, abdominal pain, diarrhea, constipation. Dark stool. NG removed  Genitourinary: Negative for dysuria.  Musculoskeletal: Positive for back pain which is improving. C/o leg pain  Skin: Positive for blotchy, erythematous rash which is resolved  Neurological: Alert and oriented  Endo/Heme/Allergies: No bleeding or bruising  Psychiatric/Behavioral: Calm    OBJECTIVE:     Max Temp: Temp (24hrs), Av.3 °C (99.1 °F), Min:36.8 °C (98.2 °F), Max:37.7 °C (99.8  "°F)      Vitals: /86   Pulse (!) 124   Temp 37.7 °C (99.8 °F) (Temporal)   Resp 16   Ht 1.651 m (5' 5\")   Wt 68.6 kg (151 lb 3.8 oz)   SpO2 96%   BMI 25.17 kg/m²     I/O:   Intake/Output Summary (Last 24 hours) at 1/2/2023 1549  Last data filed at 1/2/2023 1200  Gross per 24 hour   Intake 1034.18 ml   Output 2350 ml   Net -1315.82 ml       Labs:    Most Recent Hematology Labs:    Lab Results   Component Value Date/Time    WBC 0.6 (LL) 01/02/2023 0200    HEMOGLOBIN 8.0 (L) 01/02/2023 0200    MCV 83.9 01/02/2023 0200    PLATELETCT 22 (L) 01/02/2023 0200    NEUTS 0.37 (LL) 01/02/2023 0200       Most Recent Metabolic Panel:    Lab Results   Component Value Date/Time    POTASSIUM 3.7 01/02/2023 0200    CHLORIDE 99 01/02/2023 0200    CO2 28 01/02/2023 0200    GLUCOSE 126 (H) 01/02/2023 0200    BUN 12 01/02/2023 0200    CREATININE 0.28 (L) 01/02/2023 0200    CALCIUM 7.6 (L) 01/02/2023 0200    ANION 6.0 (L) 01/02/2023 0200       Physical Exam:    Constitutional: Much improved, pallor improved. Edema improved  HENT: Normocephalic and atraumatic. Alopecia.  No rhinorrhea. Lips more moist, previously noted crusted blood not seen  Eyes: Conjunctivae are normal. EOMI.   Neck: Normal range of motion of neck, no adenopathy.    Cardiovascular: HR much improved but more tachycardic today, regular rhythm. DP/radial pulses 2+, cap refill < 2 sec  Pulmonary/Chest: Effort normall. No respiratory distress. Symmetric expansion.  No crackles or wheezes.  Abdomen: Soft. Bowel sounds are normal. No distension and no mass. There is no hepatosplenomegaly.   Genitourinary:  Condom cath present  Musculoskeletal: Tenderness to palpation of calf muscles  Neurological: Alert and oriented to person and place.   Skin: Blotchy, erythematous rash on the face and trunk almost resolved. Port R chest wall, dressing c/d/i  Psychiatric : Calm and laying in bed      ASSESSMENT AND PLAN:     Tomas Jean-Baptiste is a 22 y.o. male with " [place] Chromosome Precursor B-Cell Acute Lymphoblastic Leukemia who is receiving therapy per protocol [id number], on study presents to the [facility] Emergency Department with gram negative septic shock and DIC. Culture now growing (within 7 hrs of drawing blood culture) E.Coli and Klebsiella. Repeat blood culture showing no growth. Remains on Cefepime. Critically ill initially requiring intensive care but now much improved and hence transferred to floor.    Counts trending up. Patient feels overall improved but deconditioned. Will restart IVF at half maintenance .    E.coli and Klebsiella sepsis/septic shock in an immunocompromised host:  - Blood culture from 12/27/2022: E.coli and Klebsiella  - Susceptible to cefepime, continue  - Currently off of pressors and stress dose hydrocortisone  - Repeat blood culture obtained 12/20/2022: NGTD  - ID consulted for further input, they recommend removal of port when stable. However, we will have discussion regarding with ID team. Also, FU with ID regarding duration of therapy.    Ph+ Precursor B-Cell Acute Lymphoblastic Leukemia, in MRD Remission: Receiving therapy   TAT [id number], AR Arm B, Delayed Intensification, Day 28  - HOLD imatinib given patient critically ill  - Not due for any chemotherapy until Day 29 which will be delayed to provide time for complete recovery  - Patient's clinical status much improved and counts recovering,  no need to start G-CSF or granulocyte infusion  - HOLD apixaban that was started on Day 8 to prevent pegaspargase induced clots.  - HOLD Bactrim (for PJP prophylaxis) this weekend given patient severely myelosuppressed. Restart next weekend when counts trend up.     Therapy related pancytopenia: Total white cell count trending up  - Transfuse to maintain hemoglobin >7 gm/dL or with symptoms  - Transfuse to maintain platelets >30,000/microliters, however can decrease to 10,000/microliters as hematemesis and hematochezia  improving.  - Use irradiated/leukoreduced products  - Will transfuse platelets today for platelet count of 12,000/microliters    DIC secondary to septic shock: Resolved  - S/p FFP and cryoprecipitate   - Fibrinogen level elevated (acute phase reactant?), PTT/PT normalized  - Bloody emesis/stool resolved  - Continue omeprazole for gastritis    Worsening tachycardia  - Likely from poor PO intake/diuresis and dehydration  - Will restart IVF fluid at half maintenance  - Monitor Is/Os and hemoglobin level    Pulmonary edema/Anasarca due to fluid overload from massive transfusion/fluid bolus and capillary leak from sepsis  - Off of supplemental oxygen  - Off of scheduled lasix  - Will give 10 mg of lasix      Blotchy erythematous rash: resolved  - Likely multifactorial: fever, transfusion related, engraftment?  - Will continue to monitor    Leg pain: likely from deconditioning and injury to muscle due to decreased blood supply from septic shock/lactic acidosis  - Responding well to dilaudid, will increase dose to 1 mg  - Encourage physical therapy, consulted PT  - If pain worsens, consider getting doppler US to r/o DVT vs abscess.     Central Access:  - Port-a-cath in place  - EMLA cream prior to access    Disposition: Discussed plan with bedside nurse, mother and JULY. Will likely discharge home this weekend if all goes well.       Alyce Duenas MD  Pediatric Hematology / Oncology  [facility]  Cell.  [phone]  [facility]. [phone]

## 2023-01-02 NOTE — THERAPY
Occupational Therapy Contact Note    OT eval attempted. Pt refusing today due to pain, but agreeable to participating tomorrow. Will attempt tomorrow.    Leslie Valenzuela, OTR/L

## 2023-01-02 NOTE — PROGRESS NOTES
Report given to Hien MARIANO on CNU. Patient transferred in [facility] with all belongings, mother at bedside, on room air to CNU.

## 2023-01-02 NOTE — THERAPY
Physical Therapy Contact Note    Patient Name: Tomas Jean-Baptiste  Age:  22 y.o., Sex:  male  Medical Record #: [id number]  Today's Date: 1/2/2023    PT eval attempted. Pt adamantly declining mobility 2/2 pain. Reports agreeable to participate tomorrow. Will re-attempt tomorrow as appropriate    Celestino Ott PT, DPT

## 2023-01-02 NOTE — CARE PLAN
The patient is Stable - Low risk of patient condition declining or worsening    Shift Goals  Clinical Goals: maintain hemodynamic stability, pain relief, mobilize  Patient Goals: comfort, rest  Family Goals: wants pt to be comfortable and to rest    Progress made toward(s) clinical / shift goals:  Patient hemodynamically stable, to transfer to CNU    Problem: Pain - Standard  Goal: Alleviation of pain or a reduction in pain to the patient’s comfort goal  Description: Target End Date:  Prior to discharge or change in level of care    Document on Vitals flowsheet    1.  Document pain using the appropriate pain scale per order or unit policy  2.  Educate and implement non-pharmacologic comfort measures (i.e. relaxation, distraction, massage, cold/heat therapy, etc.)  3.  Pain management medications as ordered  4.  Reassess pain after pain med administration per policy  5.  If opiods administered assess patient's response to pain medication is appropriate per POSS sedation scale  6.  Follow pain management plan developed in collaboration with patient and interdisciplinary team (including palliative care or pain specialists if applicable)  Outcome: Progressing  Note: Interventions given as indicated, escalated inadequate pain control to physician, received new pain medication orders.      Problem: Psychosocial  Goal: Patient's ability to verbalize feelings about condition will improve  Description: Target End Date:  Prior to discharge or change in level of care    1.  Discuss coping with medical condition and its effects  2.  Encourage patient participation in care  3.  Encourage acknowledgement of body changes and accompanying emotions  4.  Perform depression screening  Outcome: Progressing  Note: Patient voicing concerns and encouraged to speak about feelings.        Problem: Hemodynamics  Goal: Patient's hemodynamics, fluid balance and neurologic status will be stable or improve  Description: Target End Date:  Prior  to discharge or change in level of care    Document on Assessment and I/O flowsheet templates    1.  Monitor vital signs, pulse oximetry and cardiac monitor per provider order and/or policy  2.  Maintain blood pressure per provider order  3.  Hemodynamic monitoring per provider order  4.  Manage IV fluids and IV infusions  5.  Monitor intake and output  6.  Daily weights per unit policy or provider order  7.  Assess peripheral pulses and capillary refill  8.  Assess color and body temperature  9.  Position patient for maximum circulation/cardiac output  10. Monitor for signs/symptoms of excessive bleeding  11. Assess mental status, restlessness and changes in level of consciousness  12. Monitor temperature and report fever or hypothermia to provider immediately. Consideration of targeted temperature management.  Outcome: Progressing  Note: Patient hemodynamically stable, Q12 hr lab checks for H/H       Problem: Infection - Standard  Goal: Patient will remain free from infection  Description: Target End Date:  Prior to discharge or change in level of care    1.  Utilize Standard Precautions at all times to reduce the risk of transmission of microorganisms from both recognized and  unrecognized sources of infection  2.  Infection prevention handouts provided (general/device/diagnosis specific) and documented in Patient Education  3.  Educate patient and family/caregiver on isolation precautions if applicable  Outcome: Progressing  Flowsheets (Taken 1/1/2023 1650)  Standard Infection Interventions:   Assessed for signs and symptoms of infection   Provided education on proper hand hygiene and infection prevention measures   Instructed patient/family on signs and symptoms of infection   Assessed for removal IV, central lines, intra-arterial or urinary catheters   Implemented standard precautions       Patient is not progressing towards the following goals:

## 2023-01-02 NOTE — CARE PLAN
The patient is Watcher - Medium risk of patient condition declining or worsening    Shift Goals  Clinical Goals: Monitor labs and O2  Patient Goals: Pain control  Family Goals: wants pt to be comfortable and to rest    Progress made toward(s) clinical / shift goals:        Problem: Knowledge Deficit - Standard  Goal: Patient and family/care givers will demonstrate understanding of plan of care, disease process/condition, diagnostic tests and medications  Outcome: Progressing  Note: Discussed POC with pt and mom, starting IVF due to pt's tachycardia, placed pt on continuous telemetry monitoring.      Problem: Pain - Standard  Goal: Alleviation of pain or a reduction in pain to the patient’s comfort goal  Outcome: Progressing  Note: Pt reports 8/10 bilateral leg pain, medicated per MAR.

## 2023-01-03 ENCOUNTER — APPOINTMENT (OUTPATIENT)
Dept: RADIOLOGY | Facility: MEDICAL CENTER | Age: 23
DRG: 871 | End: 2023-01-03
Attending: INTERNAL MEDICINE
Payer: COMMERCIAL

## 2023-01-03 PROBLEM — G89.29 CHRONIC MIDLINE THORACIC BACK PAIN: Status: RESOLVED | Noted: 2020-04-03 | Resolved: 2023-01-03

## 2023-01-03 PROBLEM — E87.20 LACTIC ACID ACIDOSIS: Status: RESOLVED | Noted: 2022-12-27 | Resolved: 2023-01-03

## 2023-01-03 PROBLEM — C91.01 ALL (ACUTE LYMPHOID LEUKEMIA) IN REMISSION (HCC): Status: RESOLVED | Noted: 2022-11-14 | Resolved: 2023-01-03

## 2023-01-03 PROBLEM — E83.51 HYPOCALCEMIA: Status: RESOLVED | Noted: 2022-12-27 | Resolved: 2023-01-03

## 2023-01-03 PROBLEM — I26.99 PE (PULMONARY THROMBOEMBOLISM) (HCC): Status: RESOLVED | Noted: 2022-05-27 | Resolved: 2023-01-03

## 2023-01-03 PROBLEM — R57.9 SHOCK (HCC): Status: RESOLVED | Noted: 2022-12-27 | Resolved: 2023-01-03

## 2023-01-03 PROBLEM — N17.9 ACUTE KIDNEY INJURY (HCC): Status: RESOLVED | Noted: 2022-12-27 | Resolved: 2023-01-03

## 2023-01-03 PROBLEM — M25.561 BILATERAL ANTERIOR KNEE PAIN: Status: RESOLVED | Noted: 2020-04-03 | Resolved: 2023-01-03

## 2023-01-03 PROBLEM — M54.6 CHRONIC MIDLINE THORACIC BACK PAIN: Status: RESOLVED | Noted: 2020-04-03 | Resolved: 2023-01-03

## 2023-01-03 PROBLEM — M25.571 BILATERAL ANKLE JOINT PAIN: Status: RESOLVED | Noted: 2020-04-03 | Resolved: 2023-01-03

## 2023-01-03 PROBLEM — D65 DIC (DISSEMINATED INTRAVASCULAR COAGULATION) (HCC): Status: RESOLVED | Noted: 2022-12-27 | Resolved: 2023-01-03

## 2023-01-03 PROBLEM — D70.9 NEUTROPENIC SEPSIS (HCC): Status: RESOLVED | Noted: 2022-12-27 | Resolved: 2023-01-03

## 2023-01-03 PROBLEM — M25.572 BILATERAL ANKLE JOINT PAIN: Status: RESOLVED | Noted: 2020-04-03 | Resolved: 2023-01-03

## 2023-01-03 PROBLEM — E87.29 HIGH ANION GAP METABOLIC ACIDOSIS: Status: RESOLVED | Noted: 2022-12-27 | Resolved: 2023-01-03

## 2023-01-03 PROBLEM — A41.9 NEUTROPENIC SEPSIS (HCC): Status: RESOLVED | Noted: 2022-12-27 | Resolved: 2023-01-03

## 2023-01-03 PROBLEM — I82.890 SUPERFICIAL VEIN THROMBOSIS: Status: RESOLVED | Noted: 2022-05-27 | Resolved: 2023-01-03

## 2023-01-03 PROBLEM — M25.562 BILATERAL ANTERIOR KNEE PAIN: Status: RESOLVED | Noted: 2020-04-03 | Resolved: 2023-01-03

## 2023-01-03 PROBLEM — C91.00: Status: RESOLVED | Noted: 2022-09-26 | Resolved: 2023-01-03

## 2023-01-03 LAB
ALBUMIN SERPL BCP-MCNC: 2.4 G/DL (ref 3.2–4.9)
ALBUMIN/GLOB SERPL: 1 G/DL
ALP SERPL-CCNC: 139 U/L (ref 30–99)
ALT SERPL-CCNC: 64 U/L (ref 2–50)
ANION GAP SERPL CALC-SCNC: 8 MMOL/L (ref 7–16)
ANISOCYTOSIS BLD QL SMEAR: ABNORMAL
AST SERPL-CCNC: 23 U/L (ref 12–45)
BASOPHILS # BLD AUTO: 0 % (ref 0–1.8)
BASOPHILS # BLD: 0 K/UL (ref 0–0.12)
BILIRUB SERPL-MCNC: 0.4 MG/DL (ref 0.1–1.5)
BUN SERPL-MCNC: 8 MG/DL (ref 8–22)
CA-I SERPL-SCNC: 1.1 MMOL/L (ref 1.1–1.3)
CALCIUM ALBUM COR SERPL-MCNC: 9.4 MG/DL (ref 8.5–10.5)
CALCIUM SERPL-MCNC: 8.1 MG/DL (ref 8.5–10.5)
CHLORIDE SERPL-SCNC: 101 MMOL/L (ref 96–112)
CO2 SERPL-SCNC: 28 MMOL/L (ref 20–33)
CREAT SERPL-MCNC: 0.31 MG/DL (ref 0.5–1.4)
EOSINOPHIL # BLD AUTO: 0 K/UL (ref 0–0.51)
EOSINOPHIL NFR BLD: 0 % (ref 0–6.9)
ERYTHROCYTE [DISTWIDTH] IN BLOOD BY AUTOMATED COUNT: 51.4 FL (ref 35.9–50)
GFR SERPLBLD CREATININE-BSD FMLA CKD-EPI: 172 ML/MIN/1.73 M 2
GLOBULIN SER CALC-MCNC: 2.5 G/DL (ref 1.9–3.5)
GLUCOSE SERPL-MCNC: 100 MG/DL (ref 65–99)
HCT VFR BLD AUTO: 23.9 % (ref 42–52)
HGB BLD-MCNC: 8 G/DL (ref 14–18)
HYPOCHROMIA BLD QL SMEAR: ABNORMAL
LYMPHOCYTES # BLD AUTO: 0.28 K/UL (ref 1–4.8)
LYMPHOCYTES NFR BLD: 25 % (ref 22–41)
MAGNESIUM SERPL-MCNC: 1.7 MG/DL (ref 1.5–2.5)
MANUAL DIFF BLD: NORMAL
MCH RBC QN AUTO: 28.3 PG (ref 27–33)
MCHC RBC AUTO-ENTMCNC: 33.5 G/DL (ref 33.7–35.3)
MCV RBC AUTO: 84.5 FL (ref 81.4–97.8)
MICROCYTES BLD QL SMEAR: ABNORMAL
MONOCYTES # BLD AUTO: 0.09 K/UL (ref 0–0.85)
MONOCYTES NFR BLD AUTO: 8.3 % (ref 0–13.4)
MORPHOLOGY BLD-IMP: NORMAL
MYELOCYTES NFR BLD MANUAL: 0.8 %
NEUTROPHILS # BLD AUTO: 0.71 K/UL (ref 1.82–7.42)
NEUTROPHILS NFR BLD: 62.5 % (ref 44–72)
NEUTS BAND NFR BLD MANUAL: 1.7 % (ref 0–10)
NRBC # BLD AUTO: 0.06 K/UL
NRBC BLD-RTO: 5.4 /100 WBC
PHOSPHATE SERPL-MCNC: 3.5 MG/DL (ref 2.5–4.5)
PLATELET # BLD AUTO: 43 K/UL (ref 164–446)
PLATELET BLD QL SMEAR: NORMAL
PMV BLD AUTO: 9.6 FL (ref 9–12.9)
POTASSIUM SERPL-SCNC: 3.3 MMOL/L (ref 3.6–5.5)
PROMYELOCYTES NFR BLD MANUAL: 1.7 %
PROT SERPL-MCNC: 4.9 G/DL (ref 6–8.2)
RBC # BLD AUTO: 2.83 M/UL (ref 4.7–6.1)
RBC BLD AUTO: PRESENT
SODIUM SERPL-SCNC: 137 MMOL/L (ref 135–145)
WBC # BLD AUTO: 1.1 K/UL (ref 4.8–10.8)

## 2023-01-03 PROCEDURE — A9270 NON-COVERED ITEM OR SERVICE: HCPCS | Performed by: HOSPITALIST

## 2023-01-03 PROCEDURE — 99233 SBSQ HOSP IP/OBS HIGH 50: CPT | Performed by: INTERNAL MEDICINE

## 2023-01-03 PROCEDURE — 97166 OT EVAL MOD COMPLEX 45 MIN: CPT

## 2023-01-03 PROCEDURE — 700105 HCHG RX REV CODE 258: Performed by: INTERNAL MEDICINE

## 2023-01-03 PROCEDURE — 85007 BL SMEAR W/DIFF WBC COUNT: CPT

## 2023-01-03 PROCEDURE — 85025 COMPLETE CBC W/AUTO DIFF WBC: CPT

## 2023-01-03 PROCEDURE — 97162 PT EVAL MOD COMPLEX 30 MIN: CPT

## 2023-01-03 PROCEDURE — 700111 HCHG RX REV CODE 636 W/ 250 OVERRIDE (IP): Performed by: PEDIATRICS

## 2023-01-03 PROCEDURE — 97535 SELF CARE MNGMENT TRAINING: CPT

## 2023-01-03 PROCEDURE — 700111 HCHG RX REV CODE 636 W/ 250 OVERRIDE (IP): Performed by: INTERNAL MEDICINE

## 2023-01-03 PROCEDURE — 84100 ASSAY OF PHOSPHORUS: CPT

## 2023-01-03 PROCEDURE — 83735 ASSAY OF MAGNESIUM: CPT

## 2023-01-03 PROCEDURE — A9270 NON-COVERED ITEM OR SERVICE: HCPCS | Performed by: PEDIATRICS

## 2023-01-03 PROCEDURE — 99232 SBSQ HOSP IP/OBS MODERATE 35: CPT | Performed by: PEDIATRICS

## 2023-01-03 PROCEDURE — 700102 HCHG RX REV CODE 250 W/ 637 OVERRIDE(OP): Performed by: HOSPITALIST

## 2023-01-03 PROCEDURE — 80053 COMPREHEN METABOLIC PANEL: CPT

## 2023-01-03 PROCEDURE — 700102 HCHG RX REV CODE 250 W/ 637 OVERRIDE(OP): Performed by: PEDIATRICS

## 2023-01-03 PROCEDURE — 97163 PT EVAL HIGH COMPLEX 45 MIN: CPT

## 2023-01-03 PROCEDURE — 82330 ASSAY OF CALCIUM: CPT

## 2023-01-03 PROCEDURE — 770004 HCHG ROOM/CARE - ONCOLOGY PRIVATE *

## 2023-01-03 PROCEDURE — 71045 X-RAY EXAM CHEST 1 VIEW: CPT

## 2023-01-03 RX ORDER — ACETAMINOPHEN 325 MG/1
650 TABLET ORAL EVERY 6 HOURS
Status: DISPENSED | OUTPATIENT
Start: 2023-01-03 | End: 2023-01-08

## 2023-01-03 RX ADMIN — HYDROMORPHONE HYDROCHLORIDE 1 MG: 1 INJECTION, SOLUTION INTRAMUSCULAR; INTRAVENOUS; SUBCUTANEOUS at 06:22

## 2023-01-03 RX ADMIN — ACETAMINOPHEN 650 MG: 325 TABLET ORAL at 19:26

## 2023-01-03 RX ADMIN — CALCIUM CARBONATE 1000 MG: 500 TABLET, CHEWABLE ORAL at 19:26

## 2023-01-03 RX ADMIN — HYDROMORPHONE HYDROCHLORIDE 1 MG: 1 INJECTION, SOLUTION INTRAMUSCULAR; INTRAVENOUS; SUBCUTANEOUS at 02:46

## 2023-01-03 RX ADMIN — HYDROMORPHONE HYDROCHLORIDE 1 MG: 1 INJECTION, SOLUTION INTRAMUSCULAR; INTRAVENOUS; SUBCUTANEOUS at 12:54

## 2023-01-03 RX ADMIN — HYDROMORPHONE HYDROCHLORIDE 1 MG: 1 INJECTION, SOLUTION INTRAMUSCULAR; INTRAVENOUS; SUBCUTANEOUS at 15:41

## 2023-01-03 RX ADMIN — FUROSEMIDE 10 MG: 10 INJECTION, SOLUTION INTRAMUSCULAR; INTRAVENOUS at 01:41

## 2023-01-03 RX ADMIN — CEFEPIME 2 G: 2 INJECTION, POWDER, FOR SOLUTION INTRAVENOUS at 15:14

## 2023-01-03 RX ADMIN — CEFEPIME 2 G: 2 INJECTION, POWDER, FOR SOLUTION INTRAVENOUS at 04:51

## 2023-01-03 RX ADMIN — OMEPRAZOLE 40 MG: 20 CAPSULE, DELAYED RELEASE ORAL at 19:26

## 2023-01-03 RX ADMIN — CEFEPIME 2 G: 2 INJECTION, POWDER, FOR SOLUTION INTRAVENOUS at 22:20

## 2023-01-03 RX ADMIN — HYDROMORPHONE HYDROCHLORIDE 1 MG: 1 INJECTION, SOLUTION INTRAMUSCULAR; INTRAVENOUS; SUBCUTANEOUS at 00:16

## 2023-01-03 RX ADMIN — Medication: at 19:48

## 2023-01-03 RX ADMIN — HYDROMORPHONE HYDROCHLORIDE 1 MG: 1 INJECTION, SOLUTION INTRAMUSCULAR; INTRAVENOUS; SUBCUTANEOUS at 09:15

## 2023-01-03 ASSESSMENT — ENCOUNTER SYMPTOMS
VOMITING: 0
NAUSEA: 0
SHORTNESS OF BREATH: 0
HEMOPTYSIS: 0
ABDOMINAL PAIN: 0
DIARRHEA: 0
COUGH: 0
FEVER: 0

## 2023-01-03 ASSESSMENT — PAIN DESCRIPTION - PAIN TYPE
TYPE: ACUTE PAIN

## 2023-01-03 ASSESSMENT — COGNITIVE AND FUNCTIONAL STATUS - GENERAL
EATING MEALS: A LITTLE
WALKING IN HOSPITAL ROOM: A LOT
TURNING FROM BACK TO SIDE WHILE IN FLAT BAD: A LOT
MOVING TO AND FROM BED TO CHAIR: UNABLE
MOBILITY SCORE: 10
CLIMB 3 TO 5 STEPS WITH RAILING: A LOT
TOILETING: A LOT
SUGGESTED CMS G CODE MODIFIER DAILY ACTIVITY: CK
MOVING FROM LYING ON BACK TO SITTING ON SIDE OF FLAT BED: UNABLE
PERSONAL GROOMING: A LITTLE
DRESSING REGULAR LOWER BODY CLOTHING: A LOT
SUGGESTED CMS G CODE MODIFIER MOBILITY: CL
STANDING UP FROM CHAIR USING ARMS: A LOT
HELP NEEDED FOR BATHING: A LOT
DAILY ACTIVITIY SCORE: 15
DRESSING REGULAR UPPER BODY CLOTHING: A LITTLE

## 2023-01-03 ASSESSMENT — ACTIVITIES OF DAILY LIVING (ADL): TOILETING: INDEPENDENT

## 2023-01-03 ASSESSMENT — GAIT ASSESSMENTS: GAIT LEVEL OF ASSIST: UNABLE TO PARTICIPATE

## 2023-01-03 NOTE — CARE PLAN
Problem: Pain - Standard  Goal: Alleviation of pain or a reduction in pain to the patient’s comfort goal  Outcome: Progressing     Problem: Fall Risk  Goal: Patient will remain free from falls  Outcome: Progressing     The patient is Watcher - Medium risk of patient condition declining or worsening    Shift Goals  Clinical Goals: monitor labs and O2, give plateletes,  Patient Goals: pain control  Family Goals: wants pt to be comfortable and to rest    Progress made toward(s) clinical / shift goals:  Pt medicated per MAR    Patient is not progressing towards the following goals:

## 2023-01-03 NOTE — PROGRESS NOTES
Infectious Disease Progress Note    Author: Nova Louis M.D. Date & Time of service: 1/3/2023  12:03 PM    Chief Complaint:  Gram negative sepsis  Neutropenia    Interval History:   22 y.o. 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 Found also to be in septic shock:blood cultures Ecoli and Kleb   AF (99.9) WBC 0.1 ANC 0 tolerating abx remains weak and edematous   AF (99.4) WBC 0.1 potassium 2.9 trop 155 platelets 9 sleeping soundly at time of rounds   AF WBC 0.2 alert today-feeling a little better-no further active bleeding No pressors   AF WBC 0.4 up to chair-weak and c/o BLE pain. Continued melena noted  1/3 AF WBC 1.1 ANC greater than 500 No new complaints Legs still hurt  Labs Reviewed, Medications Reviewed, and Radiology Reviewed.    Review of Systems:  Review of Systems   Constitutional:  Negative for fever.   Respiratory:  Negative for cough, hemoptysis and shortness of breath.    Cardiovascular:  Positive for leg swelling.        Decreased   Gastrointestinal:  Negative for abdominal pain, diarrhea, nausea and vomiting.   All other systems reviewed and are negative.    Hemodynamics:  Temp (24hrs), Av.1 °C (98.7 °F), Min:36.4 °C (97.6 °F), Max:37.8 °C (100.1 °F)  Temperature: 36.4 °C (97.6 °F)  Pulse  Av.3  Min: 60  Max: 179   Blood Pressure: (!) 133/90       Physical Exam:  Physical Exam  Vitals and nursing note reviewed.   Constitutional:       General: He is not in acute distress.     Appearance: He is ill-appearing. He is not toxic-appearing or diaphoretic.   HENT:      Nose: No rhinorrhea.      Mouth/Throat:      Pharynx: No oropharyngeal exudate or posterior oropharyngeal erythema.   Eyes:      General: No scleral icterus.     Extraocular Movements: Extraocular movements intact.      Pupils: Pupils are equal, round, and reactive to light.   Cardiovascular:      Rate and Rhythm: Tachycardia present.   Pulmonary:      Effort: Pulmonary  effort is normal. No respiratory distress.      Breath sounds: No stridor.   Abdominal:      General: There is no distension.      Palpations: Abdomen is soft.      Tenderness: There is no abdominal tenderness.   Musculoskeletal:         General: Swelling present.      Cervical back: Neck supple. No rigidity.   Skin:     Coloration: Skin is not jaundiced.      Findings: Bruising present.   Neurological:      General: No focal deficit present.      Mental Status: He is alert and oriented to person, place, and time.   Psychiatric:         Mood and Affect: Mood normal.         Behavior: Behavior normal.       Meds:    Current Facility-Administered Medications:     HYDROmorphone **OR** HYDROmorphone    NS    omeprazole    calcium carbonate    oxyCODONE immediate-release    acetaminophen    cefepime    ondansetron    [DISCONTINUED] insulin regular **AND** [CANCELED] POC blood glucose manual result **AND** [CANCELED] NOTIFY MD and PharmD **AND** Administer 20 grams of glucose (approximately 8 ounces of fruit juice) every 15 minutes PRN FSBG less than 70 mg/dL **AND** dextrose bolus    Labs:  Recent Labs     01/01/23  1554 01/02/23  0200 01/02/23 1710 01/03/23  0250   WBC 0.6* 0.6* 1.0* 1.1*   RBC 3.06* 2.80* 2.79* 2.83*   HEMOGLOBIN 8.8* 8.0* 7.9* 8.0*   HEMATOCRIT 25.6* 23.5* 23.6* 23.9*   MCV 83.7 83.9 84.6 84.5   MCH 28.8 28.6 28.3 28.3   RDW 51.0* 52.1* 50.7* 51.4*   PLATELETCT 30* 22* 12* 43*   MPV 9.3 12.0  --  9.6   NEUTSPOLYS  --  50.90 54.00 62.50   LYMPHOCYTES  --  25.90 30.10 25.00   MONOCYTES  --  6.00 8.00 8.30   EOSINOPHILS  --  0.00 0.00 0.00   BASOPHILS  --  0.00 0.00 0.00   RBCMORPHOLO  --  Present Present Present       Recent Labs     01/02/23  0200 01/02/23  1710 01/03/23  0250   SODIUM 133* 135 137   POTASSIUM 3.7 3.5* 3.3*   CHLORIDE 99 101 101   CO2 28 29 28   GLUCOSE 126* 99 100*   BUN 12 6* 8       Recent Labs     01/01/23  0500 01/01/23  1554 01/02/23  0200 01/02/23  1710 01/03/23  0250   ALBUMIN  2.1*  --  2.0*  --  2.4*   TBILIRUBIN 0.6  --  0.5  --  0.4   ALKPHOSPHAT 172*  --  155*  --  139*   TOTPROTEIN 4.6*  --  4.2*  --  4.9*   ALTSGPT 88*  --  71*  --  64*   ASTSGOT 23  --  18  --  23   CREATININE 0.25*   < > 0.28* 0.30* 0.31*    < > = values in this interval not displayed.         Imaging:  CT-ABDOMEN-PELVIS WITH    Result Date: 12/27/2022 12/27/2022 1:47 PM HISTORY/REASON FOR EXAM:  Sepsis. Nausea and vomiting. Abdominal pain chemotherapy for cancer. TECHNIQUE/EXAM DESCRIPTION:   CT scan of the abdomen and pelvis with contrast. Contrast-enhanced helical scanning was obtained from the diaphragmatic domes through the pubic symphysis following the bolus administration of nonionic contrast without complication. 100 mL of Omnipaque 350 nonionic contrast was administered without complication. Low dose optimization technique was utilized for this CT exam including automated exposure control and adjustment of the mA and/or kV according to patient size. COMPARISON: No prior studies available. FINDINGS: Lower Chest: Unremarkable. Liver: Hepatic fatty infiltration. Spleen: Unremarkable. Pancreas: Unremarkable. Gallbladder: No calcified stones. Biliary: Nondilated. Adrenal glands: Normal. Kidneys: Unremarkable without hydronephrosis. Bowel: No obstruction or acute inflammation. Normal appendix. Lymph nodes: No adenopathy. Vasculature: Unremarkable. Peritoneum: Unremarkable without ascites. Musculoskeletal: No acute or destructive process. Pelvis: No adenopathy or free fluid.     1.  Hepatic steatosis. 2.  No acute inflammatory abnormality within the abdomen or pelvis.    CT-HEAD W/O    Result Date: 12/27/2022 12/27/2022 1:42 PM HISTORY/REASON FOR EXAM:  Mental status change, unknown cause. Chemotherapy TECHNIQUE/EXAM DESCRIPTION AND NUMBER OF VIEWS: CT of the head without contrast. The study was performed on a helical multidetector CT scanner. Contiguous axial sections were obtained from the skull base through  the vertex. Up to date radiation dose reduction adjustments have been utilized to meet ALARA standards for radiation dose reduction. COMPARISON:  None available FINDINGS: The calvariae and skull base are unremarkable. There are no extraaxial fluid collections. The ventricular system and basal cisterns are within normal limits. There are no areas of abnormal density in the brain substance. There are no hemorrhagic lesions.  There are no mass effects or shift of midline structures. The brainstem and posterior fossa structures are unremarkable. Paranasal sinuses in the field of view are unremarkable. Mastoids in the field of view are unremarkable.     Head CT without contrast within normal limits. No evidence of acute cerebral infarction, hemorrhage or mass lesion.     CT-MAXILLOFACIAL W/O PLUS RECONS    Result Date: 12/27/2022 12/27/2022 1:42 PM HISTORY/REASON FOR EXAM:  Maxillofacial pain. Blood in the mouth. TECHNIQUE/EXAMD DESCRIPTION AND NUMBER OF VIEWS: CT scan maxillofacial without contrast, with reconstructions. Thin-section helical imaging was obtained of the maxillofacial structures from the orbital roofs through the mandible. Coronal and sagittal multiplanar volume reformat images were generated from the axial data. Low dose optimization technique was utilized for this CT exam including automated exposure control and adjustment of the mA and/or kV according to patient size. COMPARISON: None. FINDINGS: The maxillofacial and orbital bony structures are unremarkable. The paranasal sinuses are normally developed. There is a small retention cyst at the alveolar recess of the right maxillary sinus. There are no air-fluid levels. The mastoids and middle ear cavities are clear. There is no significant cervical adenopathy in the field-of-view.     1.  Essentially negative maxillofacial sinus CT. Small retention cyst in the alveolar recess of the right maxillary sinus of no clinical significance.    DX-CHEST-PORTABLE  (1 VIEW)    Result Date: 12/29/2022 12/29/2022 1:08 AM HISTORY/REASON FOR EXAM:  Shortness of Breath. TECHNIQUE/EXAM DESCRIPTION AND NUMBER OF VIEWS: Single portable view of the chest. COMPARISON: 12/28/2022 1:53 AM FINDINGS: Cardiomediastinal contour is unchanged. Previously described pulmonary parenchymal opacities persist. No pneumothorax identified. Orogastric tube has been removed. RIGHT chest infusion port remains.     1.  Supportive tubing as described above. 2.  No other significant change from prior exam.     DX-CHEST-PORTABLE (1 VIEW)    Result Date: 12/28/2022 12/28/2022 2:11 AM HISTORY/REASON FOR EXAM:  Shortness of Breath. TECHNIQUE/EXAM DESCRIPTION AND NUMBER OF VIEWS: Single portable view of the chest. COMPARISON: 12/27/2022 FINDINGS: Cardiomediastinal contour is unchanged. Previously described pulmonary parenchymal opacities persist. No pneumothorax identified. Supportive tubing is unchanged.     No significant change from prior exam.    DX-CHEST-PORTABLE (1 VIEW)    Result Date: 12/28/2022 12/27/2022 11:46 PM HISTORY/REASON FOR EXAM:  Shortness of Breath. TECHNIQUE/EXAM DESCRIPTION AND NUMBER OF VIEWS: Single portable view of the chest. COMPARISON: 12/27/2022 FINDINGS: Cardiac mediastinal contour is unchanged. Lungs show hypoinflation. Ill-defined bilateral perihilar opacities, new from prior exam. Pulmonary vessels are prominent. No pleural fluid collection or pneumothorax. Orogastric tube tip at the proximal stomach. RIGHT chest infusion port present.     Worsening hypoinflation and bilateral perihilar infiltrates as well as prominence of the pulmonary vasculature suggesting pulmonary edema.  Superimposed pneumonia is not excluded.    DX-CHEST-PORTABLE (1 VIEW)    Result Date: 12/27/2022 12/27/2022 2:09 PM HISTORY/REASON FOR EXAM: Chest pain TECHNIQUE/EXAM DESCRIPTION AND NUMBER OF VIEWS: Single portable view of the chest. COMPARISON: 8/29/2022 FINDINGS: There is a right subclavian  Port-A-Cath. There is no evidence of focal consolidation or evidence of pulmonary edema. There is no pleural effusion. The heart is normal in size.     No evidence of acute cardiopulmonary process.    US-EXTREMITY VENOUS LOWER BILAT    Result Date: 2022   Vascular Laboratory  CONCLUSIONS  No deep venous thrombosis.  Distal subcutaneous fat edema.  REBECA CHEEMA  Exam Date:     2022 17:13  Room #:     Inpatient  Priority:     Stat  Ht (in):             Wt (lb):  Ordering Physician:        SANDI IGLESIAS  Referring Physician:       [id number], MINOR  Sonographer:               Palma Churchill RVVIOLA  Study Type:                Complete Bilateral  Technical Quality:         Adequate  Age:    22    Gender:     M  MRN:    [id number]  :    2000      BSA:  Indications:     Localized swelling, mass and lump, lower limb, bilateral  CPT Codes:       03014  ICD Codes:       R22.43  History:         Swelling/edema of legs, concern for pulmonary embolus. Pain.                     No prior duplex.  Limitations:  PROCEDURES:  Bilateral lower extremity venous duplex imaging.  The following venous structures were evaluated: common femoral, deep  femoral, proximal great saphenous, femoral, popliteal, peroneal, and  posterior tibial veins.  Serial compression, color, and spectral Doppler flow evaluations were  performed.  FINDINGS:  Bilateral lower extremities.  No deep venous thrombosis.  All veins demonstrate complete color filling and compressibility with  normal venous flow dynamics including spontaneous flow and respiratory  phasicity.  Interstitial fluid consistent with edema is observed below the knee.  Dorian Knowles  (Electronically Signed)  Final Date:      2022                   20:46    EC-ECHOCARDIOGRAM LTD W/O CONT    Result Date: 2022  Transthoracic Echo Report Echocardiography Laboratory CONCLUSIONS The left ventricular ejection fraction is visually estimated to be 65%. Normal  regional wall motion. REBECA CHEEMA Exam Date:         2022                    17:50 Exam Location:     Inpatient Priority:          Routine Ordering Physician:        SANDI IGLESIAS Referring Physician: Sonographer:               Tigist INTERIANO Age:    22     Gender:    M MRN:    [id number] :    2000 BSA:    1.82   Ht (in):    65     Wt (lb):    165 Exam Type:     Limited Indications:     Shortness of breath ICD Codes:       786.05 CPT Codes:       81166 BP:   128    /   57     HR:   160 Technical Quality:       Technically difficult study                          incomplete information is                          obtained MEASUREMENTS  (Male / Female) Normal Values 2D ECHO Estimated LV Ejection Fraction    65 %                  * Indicates values subject to auto-interpretation LV EF:  65    % FINDINGS Left Ventricle Normal left ventricular chamber size. Normal left ventricular systolic function. The left ventricular ejection fraction is visually estimated to be 65%. Normal regional wall motion. Diastolic function is difficult to assess with arrhythmia. Right Ventricle The right ventricle is not well visualized. Right Atrium The right atrium is not well visualized. Left Atrium The left atrium is not well visualized. Mitral Valve The mitral valve is not well visualized. Aortic Valve The aortic valve is not well visualized. Tricuspid Valve The tricuspid valve is not well visualized. Pulmonic Valve The pulmonic valve is not well visualized. Pericardium Normal pericardium without effusion. Aorta The ascending aorta is not well visualized. Mayito Howell MD (Electronically Signed) Final Date:     2022                 18:38      Micro:  Results       Procedure Component Value Units Date/Time    BLOOD CULTURE [[id number]] Collected: 22 1015    Order Status: Completed Specimen: Blood from Peripheral Updated: 22 0732     Significant Indicator NEG     Source BLD      "Site PERIPHERAL     Culture Result No Growth  Note: Blood cultures are incubated for 5 days and  are monitored continuously.Positive blood cultures  are called to the RN and reported as soon as  they are identified.      Narrative:      Per Hospital Policy: Only change Specimen Src: to \"Line\" if  specified by physician order.  Right Forearm/Arm    BLOOD CULTURE [[id number]] Collected: 12/30/22 1045    Order Status: Completed Specimen: Blood from Peripheral Updated: 12/31/22 0732     Significant Indicator NEG     Source BLD     Site PERIPHERAL     Culture Result No Growth  Note: Blood cultures are incubated for 5 days and  are monitored continuously.Positive blood cultures  are called to the RN and reported as soon as  they are identified.      Narrative:      Per Hospital Policy: Only change Specimen Src: to \"Line\" if  specified by physician order.  Right Forearm/Arm    BLOOD CULTURE [[id number]]  (Abnormal) Collected: 12/27/22 1410    Order Status: Completed Specimen: Blood from Peripheral Updated: 12/29/22 0806     Significant Indicator POS     Source BLD     Site PERIPHERAL     Culture Result Growth detected by Bactec instrument. 12/27/2022  21:19      Escherichia coli  See previous culture for sensitivity report.        Klebsiella pneumoniae  See previous culture for sensitivity report.      Narrative:      CALL  Jenkins  161 tel. [phone],  CALLED  161 tel. [phone] 12/27/2022, 21:25, RB PERF. RESULTS CALLED TO: RN  [id number]  Per Hospital Policy: Only change Specimen Src: to \"Line\" if  specified by physician order.  Left AC    BLOOD CULTURE [[id number]]  (Abnormal)  (Susceptibility) Collected: 12/27/22 1345    Order Status: Completed Specimen: Blood from Peripheral Updated: 12/29/22 0806     Significant Indicator POS     Source BLD     Site PERIPHERAL     Culture Result Growth detected by Bactec instrument. 12/27/2022  21:20      Escherichia coli      Klebsiella pneumoniae    Narrative:      CALL  Jenkins  161 tel. " "[phone],  CALLED  161 tel. [phone] 12/27/2022, 21:24, RB PERF. RESULTS CALLED TO: RN  [id number]  Per Hospital Policy: Only change Specimen Src: to \"Line\" if  specified by physician order.  No site indicated    Susceptibility       Escherichia coli (1)       Antibiotic Interpretation Microscan   Method Status    Ampicillin Sensitive <=8 mcg/mL COLTEN Final    Ceftriaxone Sensitive <=1 mcg/mL COLTEN Final    Cefazolin Sensitive <=2 mcg/mL COLTEN Final    Ciprofloxacin Sensitive <=0.25 mcg/mL COLTEN Final    Cefepime Sensitive <=2 mcg/mL COLTEN Final    Cefuroxime Sensitive <=4 mcg/mL COLTEN Final    Ampicillin/sulbactam Sensitive <=4/2 mcg/mL COLTEN Final    Ertapenem Sensitive <=0.5 mcg/mL COLTEN Final    Tobramycin Intermediate 8 mcg/mL COLTEN Final    Gentamicin Sensitive <=2 mcg/mL COLTEN Final    Minocycline Sensitive <=4 mcg/mL COLTEN Final    Moxifloxacin Sensitive <=2 mcg/mL COLTEN Final    Pip/Tazobactam Sensitive <=8 mcg/mL COLTEN Final    Trimeth/Sulfa Resistant >2/38 mcg/mL COLTEN Final    Tigecycline Sensitive <=2 mcg/mL COLTEN Final              Klebsiella pneumoniae (2)       Antibiotic Interpretation Microscan   Method Status    Ceftriaxone Sensitive <=1 mcg/mL COLTEN Final    Cefazolin Sensitive <=2 mcg/mL COLTEN Final    Ciprofloxacin Sensitive <=0.25 mcg/mL COLTEN Final    Cefepime Sensitive <=2 mcg/mL COLTEN Final    Cefuroxime Sensitive <=4 mcg/mL COLTEN Final    Ampicillin/sulbactam Sensitive <=4/2 mcg/mL COLTEN Final    Ertapenem Sensitive <=0.5 mcg/mL COLTEN Final    Tobramycin Intermediate 8 mcg/mL COLTEN Final    Gentamicin Sensitive <=2 mcg/mL COLTEN Final    Minocycline Intermediate 8 mcg/mL COLTEN Final    Moxifloxacin Sensitive <=2 mcg/mL COLTEN Final    Pip/Tazobactam Sensitive <=8 mcg/mL COLTEN Final    Trimeth/Sulfa Resistant >2/38 mcg/mL COLTEN Final    Tigecycline Sensitive <=2 mcg/mL COLTEN Final                       URINE CULTURE(NEW) [[id number]] Collected: 12/27/22 1512    Order Status: Completed Specimen: Urine Updated: 12/29/22 7686     Significant Indicator " NEG     Source UR     Site -     Culture Result No growth at 48 hours.    Narrative:      Indication for culture:->Emergency Room Patient  Indication for culture:->Emergency Room Patient    S. Aureus By PCR, Nasal Complete [[id number]] Collected: 12/27/22 2105    Order Status: Completed Specimen: Respirate from Respiratory Updated: 12/28/22 0109     Staph aureus by PCR Negative     MRSA by PCR Negative    Narrative:      Collected By: [id number] JASON DELATORRE    CoV-2, FLU A/B, and RSV by PCR (2-4 Hours CEPCADFORCEID) : Collect NP swab in VTM [[id number]] Collected: 12/27/22 1500    Order Status: Completed Specimen: Respirate Updated: 12/27/22 1639     Influenza virus A RNA Negative     Influenza virus B, PCR Negative     RSV, PCR Negative     SARS-CoV-2 by PCR NotDetected     Comment: [facility] providers: PLEASE REFER TO DE-ESCALATION AND RETESTING PROTOCOL  on [email or web address]    **The [employer] GeneXpert Xpress SARS-CoV-2 RT-PCR Test has been made  available for use under the Emergency Use Authorization (EUA) only.          SARS-CoV-2 Source NP Swab    URINALYSIS [[id number]]  (Abnormal) Collected: 12/27/22 1512    Order Status: Completed Specimen: Urine Updated: 12/27/22 1544     Color Yellow     Character Clear     Specific Gravity >=1.045     Ph 5.0     Glucose Negative mg/dL      Ketones Negative mg/dL      Protein Negative mg/dL      Bilirubin Negative     Urobilinogen, Urine 0.2     Nitrite Negative     Leukocyte Esterase Negative     Occult Blood Trace     Micro Urine Req Microscopic    Narrative:      Indication for culture:->Emergency Room Patient            Assessment:  Active Hospital Problems    Diagnosis     *Shock (HCC) [R57.9]     Gram-negative bacteremia [R78.81]     DIC (disseminated intravascular coagulation) (HCC) [D65]     At high risk for venous thromboembolism (VTE) [Z91.89]     Acute kidney injury (HCC) [N17.9]     High anion gap metabolic acidosis [E87.29]     Lactic acid acidosis [E87.20]      Hyperglycemia [R73.9]     Neutropenic sepsis (HCC) [A41.9, D70.9]     Upper GI bleed [K92.2]     Hyperkalemia [E87.5]     Hypocalcemia [E83.51]     Elevated troponin [R77.8]     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   Do not change to ceftriaxone  Stop date 1/10/2023-can change to PO levofloxacin if plan for discharge prior to completion date  Repeat blood cultures negative  Transfusions and electrolyte replacement as needed  Recommend explantation port once stabilized as likely infected-possibly next week  GCSF per oncology  Plan to resume Bactrim for PJP prophylaxis per Oncology  High risk for additional bleeding  Holding chemo as long as feasible    Will sign off  Please reconsult if needed

## 2023-01-03 NOTE — THERAPY
"Physical Therapy   Initial Evaluation     Patient Name: Tomas Jean-Baptiste  Age:  22 y.o., Sex:  male  Medical Record #: [id number]  Today's Date: 1/3/2023     Precautions  Precautions: Fall Risk    Assessment  Patient is 22 y.o. male vomiting and dizziness and a syncopal event. In the ED the patient was found to be profoundly pancytopenic with evidence of severe DIC and hematemesis. Patient was admitted to the ICU with hemorrhagic shock, GI bleed. 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 Pt mobilized as detailed below. Required encouragement to participate 2/2 fatigue and BLE pain. Pt with HR into 140's at EOB, also with new O2 needs (reports on room air at baseline). Pt with limited mobility this session, however, reports he is confident he will progress, but knows his recovery will take some time. Currently recommend placement as pt below functional baseline. Will continue to follow to address below deficits.      Plan    Physical Therapy Initial Treatment Plan   Treatment Plan : Bed Mobility, Equipment, Gait Training, Manual Therapy, Neuro Re-Education / Balance, Self Care / Home Evaluation, Stair Training, Therapeutic Activities, Therapeutic Exercise  Treatment Frequency: 3 Times per Week  Duration: Until Therapy Goals Met    DC Equipment Recommendations: Unable to determine at this time  Discharge Recommendations: Recommend post-acute placement for additional physical therapy services prior to discharge home       Subjective    \"I know that I will get better.\"      Objective     01/03/23 1018   Vitals   Pulse (!) 115  (in supine)   O2 (LPM) 0.5  (reports new o2, on RA at home)   O2 Delivery Device Silicone Nasal Cannula   Vitals Comments HR up to 145 initially at EOB, improved to 130's with seated rest 1-2 min   Pain 0 - 10 Group   Therapist Pain Assessment Nurse Notified  (c/o B calf pain not rated, agreeable to mobility)   Prior Living Situation   Prior " Services Intermittent Physical Support for ADL Per Family   Housing / Facility 1 Story House   Steps Into Home 2   Steps In Home 0   Bathroom Set up Bathtub / Shower Combination   Equipment Owned None   Lives with - Patient's Self Care Capacity   (Mother)   Comments Reports mother able to assist with ADL's/mobility as needed. She is home most of the time   Prior Level of Functional Mobility   Bed Mobility Independent   Transfer Status Independent   Ambulation Independent   Distance Ambulation (Feet)   (community)   Assistive Devices Used None   Stairs Independent   Comments Fluctuating PLOF given diagnosis and past admissions, however, baseline independent with all mobility   History of Falls   History of Falls No   Cognition    Cognition / Consciousness WDL   Level of Consciousness Alert   Comments Pleasant and cooperative. Requiring encouragement   Passive ROM Upper Body   Passive ROM Upper Body WDL   Active ROM Upper Body   Active ROM Upper Body  WDL   Strength Upper Body   Upper Body Strength  WDL   Comments pt reporting no issues with UE's   Sensation Upper Body   Upper Extremity Sensation  WDL   Upper Body Muscle Tone   Upper Body Muscle Tone  WDL   Passive ROM Lower Body   Passive ROM Lower Body WDL   Active ROM Lower Body    Active ROM Lower Body  X   Comments unable to bring legs to EOB without using his hands, unable to perform full LAQ.   Strength Lower Body   Lower Body Strength  X   Comments Grossly BLE 2-/5   Sensation Lower Body   Lower Extremity Sensation   X   Comments reports BLE N/T, improved with meds   Coordination Upper Body   Coordination WDL   Comments intact finger opposition   Coordination Lower Body    Coordination Lower Body  X   Comments limited by weakness   Other Treatments   Other Treatments Provided Extensive discussion with pt regarding PT role and POC, his functional goals. Reporting he wants to be able to walk again, however, knows his recovery will take time. Pt confident about  his ability to return to his PLOF eventually. Understands PT will be coming multiple times/week to improve his functional mobility if his goals are to walk again, as pt required encouragement to participate   Balance Assessment   Sitting Balance (Static) Fair   Sitting Balance (Dynamic) Fair -   Weight Shift Sitting Poor   Comments EOB only   Bed Mobility    Supine to Sit Minimal Assist   Sit to Supine Contact Guard Assist   Scooting Contact Guard Assist   Gait Analysis   Gait Level Of Assist Unable to Participate   Comments EOB only 2/2 fatigue and pain   Functional Mobility   Sit to Stand Unable to Participate  (EOB only 2/2 fatigue and pain)   Bed, Chair, Wheelchair Transfer Unable to Participate   Mobility EOB only, return to supine   How much difficulty does the patient currently have...   Turning over in bed (including adjusting bedclothes, sheets and blankets)? 2   Sitting down on and standing up from a chair with arms (e.g., wheelchair, bedside commode, etc.) 1   Moving from lying on back to sitting on the side of the bed? 1   How much help from another person does the patient currently need...   Moving to and from a bed to a chair (including a wheelchair)? 2   Need to walk in a hospital room? 2   Climbing 3-5 steps with a railing? 2   6 clicks Mobility Score 10   Activity Tolerance   Sitting Edge of Bed 2-3 min   Comments limited 2/2 fatigue, pain   Edema / Skin Assessment   Comments BLE warm to touch   Patient / Family Goals    Patient / Family Goal #1 To walk again   Short Term Goals    Short Term Goal # 1 Pt will perform supine <> sit with SPV in 6 visits to return to PLOF   Short Term Goal # 2 Pt will perform STS transfer with FWW and SPV in 6 visits to improve functional transfers   Short Term Goal # 3 Pt will ambulate 150ft with FWW and SPV in 6 visits to access household distances   Short Term Goal # 4 Pt will negotiate 2 steps with Adin in 6 visits to safely enter/exit home   Education Group    Education Provided Role of Physical Therapist   Role of Physical Therapist Patient Response Patient;Family;Acceptance;Explanation;Verbal Demonstration   Additional Comments Pt educated on self management and compensatory strategies with mobility, importance of continued daily OOB mobility and activity progression, HR monitoring.   Physical Therapy Initial Treatment Plan    Treatment Plan  Bed Mobility;Equipment;Gait Training;Manual Therapy;Neuro Re-Education / Balance;Self Care / Home Evaluation;Stair Training;Therapeutic Activities;Therapeutic Exercise   Treatment Frequency 3 Times per Week   Duration Until Therapy Goals Met   Problem List    Problems Pain;Impaired Bed Mobility;Impaired Transfers;Impaired Ambulation;Functional Strength Deficit;Impaired Balance;Impaired Coordination;Decreased Activity Tolerance   Anticipated Discharge Equipment and Recommendations   DC Equipment Recommendations Unable to determine at this time   Discharge Recommendations Recommend post-acute placement for additional physical therapy services prior to discharge home   Interdisciplinary Plan of Care Collaboration   IDT Collaboration with  Nursing;Occupational Therapist;Family / Caregiver   Patient Position at End of Therapy In Bed;Call Light within Reach;Tray Table within Reach;Phone within Reach;Family / Friend in Room  (as found)   Collaboration Comments RN updated   Session Information   Date / Session Number  1/3- 1 (1/3, 1/9)

## 2023-01-03 NOTE — CARE PLAN
The patient is Watcher - Medium risk of patient condition declining or worsening    Shift Goals  Clinical Goals: monitor labs and O2, give plateletes,  Patient Goals: pain control  Family Goals: wants pt to be comfortable and to rest    Progress made toward(s) clinical / shift goals:      Problem: Knowledge Deficit - Standard  Goal: Patient and family/care givers will demonstrate understanding of plan of care, disease process/condition, diagnostic tests and medications  Outcome: Progressing  Note: A/Ox4, pt is able to understand plan of care. All questions answered at the moment.       Problem: Pain - Standard  Goal: Alleviation of pain or a reduction in pain to the patient’s comfort goal  Outcome: Progressing  Note: PRN pain medications given per MAR working effectively to promote comfort.        Problem: Skin Integrity  Goal: Skin integrity is maintained or improved  Outcome: Progressing     Problem: Fall Risk  Goal: Patient will remain free from falls  Outcome: Progressing  Note: Fall precautions in place, bed in lowest position, call light and belongings in reach.   Pt calls appropriately.       Problem: Psychosocial  Goal: Patient's level of anxiety will decrease  Outcome: Progressing       Patient is not progressing towards the following goals:

## 2023-01-04 ENCOUNTER — APPOINTMENT (OUTPATIENT)
Dept: RADIOLOGY | Facility: MEDICAL CENTER | Age: 23
DRG: 871 | End: 2023-01-04
Attending: INTERNAL MEDICINE
Payer: COMMERCIAL

## 2023-01-04 LAB
ABO GROUP BLD: NORMAL
ALBUMIN SERPL BCP-MCNC: 2.2 G/DL (ref 3.2–4.9)
ALBUMIN/GLOB SERPL: 1 G/DL
ALP SERPL-CCNC: 129 U/L (ref 30–99)
ALT SERPL-CCNC: 73 U/L (ref 2–50)
ANION GAP SERPL CALC-SCNC: 6 MMOL/L (ref 7–16)
AST SERPL-CCNC: 35 U/L (ref 12–45)
BACTERIA BLD CULT: NORMAL
BACTERIA BLD CULT: NORMAL
BARCODED ABORH UBTYP: 5100
BARCODED ABORH UBTYP: 7300
BARCODED PRD CODE UBPRD: NORMAL
BARCODED PRD CODE UBPRD: NORMAL
BARCODED UNIT NUM UBUNT: NORMAL
BARCODED UNIT NUM UBUNT: NORMAL
BASOPHILS # BLD AUTO: 0 % (ref 0–1.8)
BASOPHILS # BLD AUTO: 0 % (ref 0–1.8)
BASOPHILS # BLD: 0 K/UL (ref 0–0.12)
BASOPHILS # BLD: 0 K/UL (ref 0–0.12)
BILIRUB SERPL-MCNC: 0.3 MG/DL (ref 0.1–1.5)
BLD GP AB SCN SERPL QL: NORMAL
BUN SERPL-MCNC: 6 MG/DL (ref 8–22)
CALCIUM ALBUM COR SERPL-MCNC: 9 MG/DL (ref 8.5–10.5)
CALCIUM SERPL-MCNC: 7.6 MG/DL (ref 8.5–10.5)
CHLORIDE SERPL-SCNC: 103 MMOL/L (ref 96–112)
CO2 SERPL-SCNC: 28 MMOL/L (ref 20–33)
COMPONENT P 8504P: NORMAL
COMPONENT R 8504R: NORMAL
CREAT SERPL-MCNC: 0.31 MG/DL (ref 0.5–1.4)
EOSINOPHIL # BLD AUTO: 0 K/UL (ref 0–0.51)
EOSINOPHIL # BLD AUTO: 0 K/UL (ref 0–0.51)
EOSINOPHIL NFR BLD: 0 % (ref 0–6.9)
EOSINOPHIL NFR BLD: 0 % (ref 0–6.9)
ERYTHROCYTE [DISTWIDTH] IN BLOOD BY AUTOMATED COUNT: 49.8 FL (ref 35.9–50)
ERYTHROCYTE [DISTWIDTH] IN BLOOD BY AUTOMATED COUNT: 51.4 FL (ref 35.9–50)
FIBRINOGEN PPP-MCNC: 735 MG/DL (ref 215–460)
GFR SERPLBLD CREATININE-BSD FMLA CKD-EPI: 172 ML/MIN/1.73 M 2
GLOBULIN SER CALC-MCNC: 2.1 G/DL (ref 1.9–3.5)
GLUCOSE SERPL-MCNC: 112 MG/DL (ref 65–99)
HCT VFR BLD AUTO: 20.9 % (ref 42–52)
HCT VFR BLD AUTO: 24.2 % (ref 42–52)
HGB BLD-MCNC: 7 G/DL (ref 14–18)
HGB BLD-MCNC: 8.2 G/DL (ref 14–18)
LYMPHOCYTES # BLD AUTO: 0.18 K/UL (ref 1–4.8)
LYMPHOCYTES # BLD AUTO: 0.2 K/UL (ref 1–4.8)
LYMPHOCYTES NFR BLD: 11.9 % (ref 22–41)
LYMPHOCYTES NFR BLD: 16.5 % (ref 22–41)
MANUAL DIFF BLD: ABNORMAL
MANUAL DIFF BLD: NORMAL
MCH RBC QN AUTO: 28.5 PG (ref 27–33)
MCH RBC QN AUTO: 29.1 PG (ref 27–33)
MCHC RBC AUTO-ENTMCNC: 33.5 G/DL (ref 33.7–35.3)
MCHC RBC AUTO-ENTMCNC: 33.9 G/DL (ref 33.7–35.3)
MCV RBC AUTO: 85 FL (ref 81.4–97.8)
MCV RBC AUTO: 85.8 FL (ref 81.4–97.8)
METAMYELOCYTES NFR BLD MANUAL: 0.8 %
METAMYELOCYTES NFR BLD MANUAL: 1.7 %
MONOCYTES # BLD AUTO: 0.04 K/UL (ref 0–0.85)
MONOCYTES # BLD AUTO: 0.05 K/UL (ref 0–0.85)
MONOCYTES NFR BLD AUTO: 3.4 % (ref 0–13.4)
MONOCYTES NFR BLD AUTO: 3.5 % (ref 0–13.4)
MORPHOLOGY BLD-IMP: NORMAL
MORPHOLOGY BLD-IMP: NORMAL
MYELOCYTES NFR BLD MANUAL: 1.7 %
NEUTROPHILS # BLD AUTO: 0.94 K/UL (ref 1.82–7.42)
NEUTROPHILS # BLD AUTO: 1.23 K/UL (ref 1.82–7.42)
NEUTROPHILS NFR BLD: 67 % (ref 44–72)
NEUTROPHILS NFR BLD: 82.2 % (ref 44–72)
NEUTS BAND NFR BLD MANUAL: 11.3 % (ref 0–10)
NRBC # BLD AUTO: 0.04 K/UL
NRBC # BLD AUTO: 0.07 K/UL
NRBC BLD-RTO: 3.5 /100 WBC
NRBC BLD-RTO: 4.6 /100 WBC
PLATELET # BLD AUTO: 24 K/UL (ref 164–446)
PLATELET # BLD AUTO: 49 K/UL (ref 164–446)
PLATELET BLD QL SMEAR: NORMAL
PLATELET BLD QL SMEAR: NORMAL
PMV BLD AUTO: 11.1 FL (ref 9–12.9)
PMV BLD AUTO: 9.3 FL (ref 9–12.9)
POTASSIUM SERPL-SCNC: 2.9 MMOL/L (ref 3.6–5.5)
PRODUCT TYPE UPROD: NORMAL
PRODUCT TYPE UPROD: NORMAL
PROT SERPL-MCNC: 4.3 G/DL (ref 6–8.2)
RBC # BLD AUTO: 2.46 M/UL (ref 4.7–6.1)
RBC # BLD AUTO: 2.82 M/UL (ref 4.7–6.1)
RBC BLD AUTO: NORMAL
RBC BLD AUTO: NORMAL
RH BLD: NORMAL
SIGNIFICANT IND 70042: NORMAL
SIGNIFICANT IND 70042: NORMAL
SITE SITE: NORMAL
SITE SITE: NORMAL
SODIUM SERPL-SCNC: 137 MMOL/L (ref 135–145)
SOURCE SOURCE: NORMAL
SOURCE SOURCE: NORMAL
UNIT STATUS USTAT: NORMAL
UNIT STATUS USTAT: NORMAL
WBC # BLD AUTO: 1.2 K/UL (ref 4.8–10.8)
WBC # BLD AUTO: 1.5 K/UL (ref 4.8–10.8)

## 2023-01-04 PROCEDURE — 71045 X-RAY EXAM CHEST 1 VIEW: CPT

## 2023-01-04 PROCEDURE — 700111 HCHG RX REV CODE 636 W/ 250 OVERRIDE (IP): Performed by: PEDIATRICS

## 2023-01-04 PROCEDURE — P9016 RBC LEUKOCYTES REDUCED: HCPCS

## 2023-01-04 PROCEDURE — 85025 COMPLETE CBC W/AUTO DIFF WBC: CPT | Mod: 91

## 2023-01-04 PROCEDURE — 86850 RBC ANTIBODY SCREEN: CPT

## 2023-01-04 PROCEDURE — 86923 COMPATIBILITY TEST ELECTRIC: CPT

## 2023-01-04 PROCEDURE — P9034 PLATELETS, PHERESIS: HCPCS

## 2023-01-04 PROCEDURE — 700101 HCHG RX REV CODE 250: Performed by: PEDIATRICS

## 2023-01-04 PROCEDURE — 80053 COMPREHEN METABOLIC PANEL: CPT

## 2023-01-04 PROCEDURE — A9270 NON-COVERED ITEM OR SERVICE: HCPCS | Performed by: HOSPITALIST

## 2023-01-04 PROCEDURE — A9270 NON-COVERED ITEM OR SERVICE: HCPCS | Performed by: PEDIATRICS

## 2023-01-04 PROCEDURE — 99232 SBSQ HOSP IP/OBS MODERATE 35: CPT | Performed by: PEDIATRICS

## 2023-01-04 PROCEDURE — 770004 HCHG ROOM/CARE - ONCOLOGY PRIVATE *

## 2023-01-04 PROCEDURE — 700111 HCHG RX REV CODE 636 W/ 250 OVERRIDE (IP): Performed by: INTERNAL MEDICINE

## 2023-01-04 PROCEDURE — 700102 HCHG RX REV CODE 250 W/ 637 OVERRIDE(OP): Performed by: HOSPITALIST

## 2023-01-04 PROCEDURE — 86900 BLOOD TYPING SEROLOGIC ABO: CPT

## 2023-01-04 PROCEDURE — 86901 BLOOD TYPING SEROLOGIC RH(D): CPT

## 2023-01-04 PROCEDURE — 700102 HCHG RX REV CODE 250 W/ 637 OVERRIDE(OP): Performed by: PEDIATRICS

## 2023-01-04 PROCEDURE — 86945 BLOOD PRODUCT/IRRADIATION: CPT

## 2023-01-04 PROCEDURE — 36430 TRANSFUSION BLD/BLD COMPNT: CPT

## 2023-01-04 PROCEDURE — 700105 HCHG RX REV CODE 258: Performed by: INTERNAL MEDICINE

## 2023-01-04 PROCEDURE — 85384 FIBRINOGEN ACTIVITY: CPT

## 2023-01-04 PROCEDURE — 85007 BL SMEAR W/DIFF WBC COUNT: CPT

## 2023-01-04 RX ORDER — OXYMETAZOLINE HYDROCHLORIDE 0.05 G/100ML
2 SPRAY NASAL 2 TIMES DAILY PRN
Status: DISPENSED | OUTPATIENT
Start: 2023-01-04 | End: 2023-01-07

## 2023-01-04 RX ORDER — DEXTROSE MONOHYDRATE, SODIUM CHLORIDE, AND POTASSIUM CHLORIDE 50; 2.24; 4.5 G/1000ML; G/1000ML; G/1000ML
INJECTION, SOLUTION INTRAVENOUS CONTINUOUS
Status: DISCONTINUED | OUTPATIENT
Start: 2023-01-04 | End: 2023-01-10

## 2023-01-04 RX ADMIN — CEFEPIME 2 G: 2 INJECTION, POWDER, FOR SOLUTION INTRAVENOUS at 05:44

## 2023-01-04 RX ADMIN — CEFEPIME 2 G: 2 INJECTION, POWDER, FOR SOLUTION INTRAVENOUS at 22:28

## 2023-01-04 RX ADMIN — CALCIUM CARBONATE 1000 MG: 500 TABLET, CHEWABLE ORAL at 17:00

## 2023-01-04 RX ADMIN — OMEPRAZOLE 40 MG: 20 CAPSULE, DELAYED RELEASE ORAL at 17:00

## 2023-01-04 RX ADMIN — POTASSIUM CHLORIDE: 2 INJECTION, SOLUTION, CONCENTRATE INTRAVENOUS at 02:48

## 2023-01-04 RX ADMIN — ACETAMINOPHEN 650 MG: 325 TABLET ORAL at 20:08

## 2023-01-04 RX ADMIN — CALCIUM CARBONATE 1000 MG: 500 TABLET, CHEWABLE ORAL at 08:37

## 2023-01-04 RX ADMIN — CEFEPIME 2 G: 2 INJECTION, POWDER, FOR SOLUTION INTRAVENOUS at 13:05

## 2023-01-04 RX ADMIN — ACETAMINOPHEN 650 MG: 325 TABLET ORAL at 08:37

## 2023-01-04 RX ADMIN — OMEPRAZOLE 40 MG: 20 CAPSULE, DELAYED RELEASE ORAL at 08:37

## 2023-01-04 RX ADMIN — POTASSIUM CHLORIDE: 2 INJECTION, SOLUTION, CONCENTRATE INTRAVENOUS at 15:15

## 2023-01-04 RX ADMIN — Medication: at 17:01

## 2023-01-04 ASSESSMENT — PAIN DESCRIPTION - PAIN TYPE
TYPE: ACUTE PAIN

## 2023-01-04 NOTE — CARE PLAN
The patient is Watcher - Medium risk of patient condition declining or worsening    Shift Goals  Clinical Goals: Monitor labs, ambulate  Patient Goals: Pain control  Family Goals: wants pt to be comfortable and to rest    Progress made toward(s) clinical / shift goals:        Problem: Pain - Standard  Goal: Alleviation of pain or a reduction in pain to the patient’s comfort goal  Outcome: Progressing  Note: Pt reports 6/10 bilateral leg pain, Dilaudid PCA infusing per MAR, heat packs applied to both legs.      Problem: Fall Risk  Goal: Patient will remain free from falls  Outcome: Progressing  Note: Pt is A&Ox4, max assist, calls appropriately for assistance.

## 2023-01-04 NOTE — PROGRESS NOTES
Noticeable swelling to LLE, pt reports his left leg is more painful. Updated Dr. Rodriguez and received order for LLE US to rule out DVT.

## 2023-01-04 NOTE — PROGRESS NOTES
MD Rodriguez notified of Plt: 24 and Hgb: 7. Orders for RBCs and Plts placed.  MD also notified of pt having black BM and coughing up a blood clot.

## 2023-01-04 NOTE — CARE PLAN
The patient is Watcher - Medium risk of patient condition declining or worsening    Shift Goals  Clinical Goals: monitor labs and vitals, pain control, safety  Patient Goals: pain control  Family Goals: wants pt to be comfortable and to rest    Progress made toward(s) clinical / shift goals:      Problem: Knowledge Deficit - Standard  Goal: Patient and family/care givers will demonstrate understanding of plan of care, disease process/condition, diagnostic tests and medications  Outcome: Progressing  Note: A/Ox4, pt is able to understand plan of care. All questions answered at the moment.       Problem: Pain - Standard  Goal: Alleviation of pain or a reduction in pain to the patient’s comfort goal  Outcome: Progressing  Note: Pt tolerating PCA well.      Problem: Skin Integrity  Goal: Skin integrity is maintained or improved  Outcome: Progressing     Problem: Fall Risk  Goal: Patient will remain free from falls  Outcome: Progressing     Problem: Respiratory  Goal: Patient will achieve/maintain optimum respiratory ventilation and gas exchange  Outcome: Progressing       Patient is not progressing towards the following goals:

## 2023-01-04 NOTE — PROGRESS NOTES
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  JULY continues with significant diffuse LE pain, but relates slow improvement.  He is fatigued but otherwise voices no complaints.    Review of Systems:     Constitutional: Afebrile, better appetite.  HENT: Negative for ear pain, nosebleeds, congestion, rhinorrhea, sore throat, mouth sores.  Eyes: Double vision (again)  Respiratory: Negative for shortness of breath or cough.   Cardiovascular: Negative for chest pain, palpitations.    Gastrointestinal: Negative for nausea, vomiting, abdominal pain, diarrhea; no BM x 2-3 days.  Skin: Negative for rash or skin infection..  Neurological: Negative for numbness, tingling, sensory changes, weakness or headaches.    Endo/Heme/Allergies: No bruising/bleeding easily.    Psychiatric/Behavioral: \"I'm getting better\"    Medications:    Current Facility-Administered Medications:     Pharmacy Consult Request ...Pain Management Review 1 Each, 1 Each, Other, PHARMACY TO DOSE, River Rodriguez M.D.    HYDROmorphone (DILAUDID) 0.2 mg/mL in 50 mL NS (PCA), " ", Intravenous, Continuous, River Rodriguez M.D.    acetaminophen (Tylenol) tablet 650 mg, 650 mg, Oral, Q6HRS, River Rodriguez M.D.    NS infusion, , Intravenous, Continuous, Alyce Duenas M.D., Last Rate: 50 mL/hr at 23 1147, New Bag at 23 1147    omeprazole (PRILOSEC) capsule 40 mg, 40 mg, Oral, BID, Ramos Styles, D.O., 40 mg at 23 1711    calcium carbonate (Tums) chewable tab 1,000 mg, 1,000 mg, Oral, BID, Ramos Waldron-Madie, D.O., 1,000 mg at 23 1711    acetaminophen (Tylenol) tablet 650 mg, 650 mg, Oral, Q4HRS PRN, Ramos Waldron-Madie, D.O., 650 mg at 23 2138    cefepime (Maxipime) 2 g in  mL IVPB, 2 g, Intravenous, Q8HRS, Nova Louis M.D., Stopped at 23 1544    ondansetron (ZOFRAN) syringe/vial injection 4 mg, 4 mg, Intravenous, Q6HRS PRN, Caren Apodaca M.D., 4 mg at 22 1825    [DISCONTINUED] insulin regular (HumuLIN R,NovoLIN R) injection, 1-6 Units, Subcutaneous, Q6HRS, 1 Units at 23 1759 **AND** [CANCELED] POC blood glucose manual result, , , Q6H **AND** [CANCELED] NOTIFY MD and PharmD, , , Once **AND** Administer 20 grams of glucose (approximately 8 ounces of fruit juice) every 15 minutes PRN FSBG less than 70 mg/dL, , , PRN **AND** dextrose 10 % BOLUS 25 g, 25 g, Intravenous, Q15 MIN PRN, Erum Olivares M.D., Last Rate: 999 mL/hr at 22 0316, 25 g at 22 0316    OBJECTIVE:     Max Temp: Temp (24hrs), Av.2 °C (98.9 °F), Min:36.4 °C (97.6 °F), Max:37.8 °C (100.1 °F)      Vitals: /88   Pulse (!) 118   Temp 37.6 °C (99.6 °F) (Oral)   Resp 16   Ht 1.651 m (5' 5\")   Wt 68.6 kg (151 lb 3.8 oz)   SpO2 94%   BMI 25.17 kg/m²       Intake/Output Summary (Last 24 hours) at 1/3/2023 1806  Last data filed at 1/3/2023 1235  Gross per 24 hour   Intake 283 ml   Output 2600 ml   Net -2317 ml       Labs:   Latest Reference Range & Units 23 02:50   WBC 4.8 - 10.8 K/uL 1.1 (LL)   RBC 4.70 - 6.10 " M/uL 2.83 (L)   Hemoglobin 14.0 - 18.0 g/dL 8.0 (L)   Hematocrit 42.0 - 52.0 % 23.9 (L)   MCV 81.4 - 97.8 fL 84.5   Platelet Count 164 - 446 K/uL 43 (L)   MPV 9.0 - 12.9 fL 9.6   Neutrophils-Polys 44.00 - 72.00 % 62.50   Neutrophils (Absolute) 1.82 - 7.42 K/uL 0.71 (L)   Bands-Stabs 0.00 - 10.00 % 1.70   Lymphocytes 22.00 - 41.00 % 25.00   Lymphs (Absolute) 1.00 - 4.80 K/uL 0.28 (L)   Monocytes 0.00 - 13.40 % 8.30   Myelocytes % 0.80   Progranulocytes % 1.70   Nucleated RBC /100 WBC 5.40      Latest Reference Range & Units 01/03/23 02:50   Sodium 135 - 145 mmol/L 137   Potassium 3.6 - 5.5 mmol/L 3.3 (L)   Chloride 96 - 112 mmol/L 101   Co2 20 - 33 mmol/L 28   Anion Gap 7.0 - 16.0  8.0   Glucose 65 - 99 mg/dL 100 (H)   Bun 8 - 22 mg/dL 8   Creatinine 0.50 - 1.40 mg/dL 0.31 (L)   GFR (CKD-EPI) >60 mL/min/1.73 m 2 172   Calcium 8.5 - 10.5 mg/dL 8.1 (L)   Correct Calcium 8.5 - 10.5 mg/dL 9.4   AST(SGOT) 12 - 45 U/L 23   ALT(SGPT) 2 - 50 U/L 64 (H)   Alkaline Phosphatase 30 - 99 U/L 139 (H)   Total Bilirubin 0.1 - 1.5 mg/dL 0.4   Albumin 3.2 - 4.9 g/dL 2.4 (L)   Total Protein 6.0 - 8.2 g/dL 4.9 (L)   Globulin 1.9 - 3.5 g/dL 2.5   A-G Ratio g/dL 1.0   Phosphorus 2.5 - 4.5 mg/dL 3.5   Magnesium 1.5 - 2.5 mg/dL 1.7   Ionized Calcium 1.1 - 1.3 mmol/L 1.1         Physical Exam:    Constitutional: Pleasant, fatigued   HENT: Normocephalic and atraumatic. Alopecia.  No rhinorrhea. Oropharynx is clear and moist.   Eyes: Conjunctivae are normal. No scleral icterus.   Neck: Supple, no adenopathy.    Cardiovascular: RRR with 3/6 systolic murmur at left upper sternal border  Pulmonary/Chest: Effort normal. Symmetric expansion.  Clear to auscultation bilaterally.  Abdomen: Soft. Bowel sounds are normal. No distension, organomegaly or mass.     Genitourinary:  Deferred  Musculoskeletal: LEs diffusely tender, warm to touch.  Lymphadenopathy: No cervical, supraclavicular, or inguinal adenopathy.   Neurological: Alert and oriented to  "person and place. Can wiggle toes, flex knees (slightly). Coordination grossly normal.    Skin: Skin is warm, dry and pink.  No rash or evidence of skin infection. Port site clean and dry, accessed.   Psychiatric: Polite, appreciative.    ASSESSMENT AND PLAN:     Tomas Roy \"JULY\" Estiven is a 22 y.o. male with [place] Chromosome Precursor B-Cell Acute Lymphoblastic Leukemia who is receiving therapy on protocol [id number], who presented to the [facility] Emergency Department on Dec 27 with gram negative septic shock and DIC. Culture grew (within 7 hrs of drawing blood culture) E.coli and Klebsiella. Repeat blood culture ultimately no growth. Remains on Cefepime. Critically ill initially requiring intensive care but now much improved and hence transferred to floor on January 1.     WBC/ANC trending up. Patient feels overall improved but deconditioned. IVF at half maintenance .     E.coli and Klebsiella sepsis/septic shock in an immunocompromised host:  - Susceptible to cefepime, continue  - Currently off of pressors and stress dose hydrocortisone  - Repeat blood culture obtained 12/30/2022: no growth  - ID consulted for further input, they recommended removal of port when stable. However, we are hoping to maintain the CVL. Also, will F/U with ID regarding duration of therapy.     Ph+ Precursor B-Cell Acute Lymphoblastic Leukemia, in MRD Remission: Receiving therapy on [id number], AR Arm B, Delayed Intensification, Day 29  - HOLD imatinib given patient critically ill  - Due to begin 2nd half of DI today, which will be delayed to provide time for complete recovery  - Patient's clinical status much improved and counts recovering,  no need to start G-CSF or granulocyte infusion  - HELD Bactrim (for PJP prophylaxis) this weekend given patient severely myelosuppressed. Restart next weekend when counts trend up.     Therapy-related pancytopenia: Total white cell count trending up  - Transfuse to " maintain hemoglobin >7 gm/dL or with symptoms  - Transfuse to maintain platelets >30,000/microliters (given yesterday)  - Use irradiated/leukoreduced products     DIC secondary to septic shock: Resolved  - s/p FFP and cryoprecipitate   - Fibrinogen level elevated (acute phase reactant?), PTT/PT normalized  - Bloody emesis/stool resolved  - Continue omeprazole for gastritis    At risk for DVT:  - Previously tx'ed with apixaban following asparaginase but held with this episode  - Ordering SCDs today (if tolerated)    Tachycardia  - Likely from poor PO intake/diuresis and dehydration  - Will restart IVF fluid at half maintenance  - Monitor I/O and hemoglobin level  - Systolic murmur noted today; will monitor, consider repeat echocardiogram     Pulmonary edema/Anasarca due to fluid overload from massive transfusion/fluid bolus and capillary leak from sepsis  - On minimal supplemental oxygen  - Off of scheduled lasix      Leg pain: likely from deconditioning and injury to muscle due to decreased blood supply from septic shock/lactic acidosis  - Responding well to dilaudid, will increase dose to 1 mg  - Encourage physical therapy, consulted PT  - If pain worsens, consider getting doppler US to r/o DVT vs abscess.    ADDENDUM: After additional discussion, JULY would like to switch to PCA Dilaudid.  Orders placed.     Central Access:  - Port-a-cath in place  - EMLA cream prior to access    Discussed with nursing staff, Dr. Duenas, Dr. Faye.  Total time today approx 55 minutes.    ANN MARIE Rodriguez MD  Pediatric Hematology / Oncology  [facility]  Cell. [phone]  Office. [phone]

## 2023-01-05 ENCOUNTER — APPOINTMENT (OUTPATIENT)
Dept: RADIOLOGY | Facility: MEDICAL CENTER | Age: 23
DRG: 871 | End: 2023-01-05
Attending: PEDIATRICS
Payer: COMMERCIAL

## 2023-01-05 ENCOUNTER — APPOINTMENT (OUTPATIENT)
Dept: CARDIOLOGY | Facility: MEDICAL CENTER | Age: 23
DRG: 871 | End: 2023-01-05
Attending: PEDIATRICS
Payer: COMMERCIAL

## 2023-01-05 ENCOUNTER — APPOINTMENT (OUTPATIENT)
Dept: RADIOLOGY | Facility: MEDICAL CENTER | Age: 23
DRG: 871 | End: 2023-01-05
Attending: INTERNAL MEDICINE
Payer: COMMERCIAL

## 2023-01-05 LAB
BASOPHILS # BLD AUTO: 0 % (ref 0–1.8)
BASOPHILS # BLD: 0 K/UL (ref 0–0.12)
EOSINOPHIL # BLD AUTO: 0 K/UL (ref 0–0.51)
EOSINOPHIL NFR BLD: 0 % (ref 0–6.9)
ERYTHROCYTE [DISTWIDTH] IN BLOOD BY AUTOMATED COUNT: 50.8 FL (ref 35.9–50)
HCT VFR BLD AUTO: 23.5 % (ref 42–52)
HGB BLD-MCNC: 7.9 G/DL (ref 14–18)
HYPOCHROMIA BLD QL SMEAR: ABNORMAL
LV EJECT FRACT  99904: 70
LV EJECT FRACT MOD 2C 99903: 55.24
LV EJECT FRACT MOD 4C 99902: 64.23
LV EJECT FRACT MOD BP 99901: 60.65
LYMPHOCYTES # BLD AUTO: 0.19 K/UL (ref 1–4.8)
LYMPHOCYTES NFR BLD: 11.3 % (ref 22–41)
MANUAL DIFF BLD: NORMAL
MCH RBC QN AUTO: 29 PG (ref 27–33)
MCHC RBC AUTO-ENTMCNC: 33.6 G/DL (ref 33.7–35.3)
MCV RBC AUTO: 86.4 FL (ref 81.4–97.8)
METAMYELOCYTES NFR BLD MANUAL: 1.7 %
MONOCYTES # BLD AUTO: 0.09 K/UL (ref 0–0.85)
MONOCYTES NFR BLD AUTO: 5.2 % (ref 0–13.4)
MORPHOLOGY BLD-IMP: NORMAL
MYELOCYTES NFR BLD MANUAL: 0.9 %
NEUTROPHILS # BLD AUTO: 1.38 K/UL (ref 1.82–7.42)
NEUTROPHILS NFR BLD: 80.9 % (ref 44–72)
NRBC # BLD AUTO: 0.08 K/UL
NRBC BLD-RTO: 4.8 /100 WBC
PLATELET # BLD AUTO: 42 K/UL (ref 164–446)
PLATELET BLD QL SMEAR: NORMAL
PMV BLD AUTO: 10.1 FL (ref 9–12.9)
POLYCHROMASIA BLD QL SMEAR: NORMAL
RBC # BLD AUTO: 2.72 M/UL (ref 4.7–6.1)
RBC BLD AUTO: PRESENT
WBC # BLD AUTO: 1.7 K/UL (ref 4.8–10.8)

## 2023-01-05 PROCEDURE — 700101 HCHG RX REV CODE 250: Performed by: PEDIATRICS

## 2023-01-05 PROCEDURE — 93970 EXTREMITY STUDY: CPT

## 2023-01-05 PROCEDURE — 97530 THERAPEUTIC ACTIVITIES: CPT

## 2023-01-05 PROCEDURE — 700111 HCHG RX REV CODE 636 W/ 250 OVERRIDE (IP): Performed by: INTERNAL MEDICINE

## 2023-01-05 PROCEDURE — 85025 COMPLETE CBC W/AUTO DIFF WBC: CPT

## 2023-01-05 PROCEDURE — 700105 HCHG RX REV CODE 258: Performed by: PEDIATRICS

## 2023-01-05 PROCEDURE — 700102 HCHG RX REV CODE 250 W/ 637 OVERRIDE(OP): Performed by: HOSPITALIST

## 2023-01-05 PROCEDURE — 700105 HCHG RX REV CODE 258: Performed by: INTERNAL MEDICINE

## 2023-01-05 PROCEDURE — 700111 HCHG RX REV CODE 636 W/ 250 OVERRIDE (IP): Performed by: PEDIATRICS

## 2023-01-05 PROCEDURE — 71045 X-RAY EXAM CHEST 1 VIEW: CPT

## 2023-01-05 PROCEDURE — 85007 BL SMEAR W/DIFF WBC COUNT: CPT

## 2023-01-05 PROCEDURE — A9270 NON-COVERED ITEM OR SERVICE: HCPCS | Performed by: PEDIATRICS

## 2023-01-05 PROCEDURE — 93306 TTE W/DOPPLER COMPLETE: CPT

## 2023-01-05 PROCEDURE — 770004 HCHG ROOM/CARE - ONCOLOGY PRIVATE *

## 2023-01-05 PROCEDURE — 93306 TTE W/DOPPLER COMPLETE: CPT | Mod: 26 | Performed by: INTERNAL MEDICINE

## 2023-01-05 PROCEDURE — A9270 NON-COVERED ITEM OR SERVICE: HCPCS | Performed by: HOSPITALIST

## 2023-01-05 PROCEDURE — 700102 HCHG RX REV CODE 250 W/ 637 OVERRIDE(OP): Performed by: PEDIATRICS

## 2023-01-05 PROCEDURE — 99232 SBSQ HOSP IP/OBS MODERATE 35: CPT | Performed by: PEDIATRICS

## 2023-01-05 RX ORDER — HYDROMORPHONE HYDROCHLORIDE 1 MG/ML
1 INJECTION, SOLUTION INTRAMUSCULAR; INTRAVENOUS; SUBCUTANEOUS ONCE
Status: COMPLETED | OUTPATIENT
Start: 2023-01-05 | End: 2023-01-05

## 2023-01-05 RX ADMIN — CEFEPIME 2 G: 2 INJECTION, POWDER, FOR SOLUTION INTRAVENOUS at 20:45

## 2023-01-05 RX ADMIN — CALCIUM CARBONATE 1000 MG: 500 TABLET, CHEWABLE ORAL at 09:20

## 2023-01-05 RX ADMIN — HYDROMORPHONE HYDROCHLORIDE: 10 INJECTION, SOLUTION INTRAMUSCULAR; INTRAVENOUS; SUBCUTANEOUS at 16:47

## 2023-01-05 RX ADMIN — ACETAMINOPHEN 650 MG: 325 TABLET ORAL at 21:01

## 2023-01-05 RX ADMIN — CEFEPIME 2 G: 2 INJECTION, POWDER, FOR SOLUTION INTRAVENOUS at 05:18

## 2023-01-05 RX ADMIN — OMEPRAZOLE 40 MG: 20 CAPSULE, DELAYED RELEASE ORAL at 09:19

## 2023-01-05 RX ADMIN — CALCIUM CARBONATE 1000 MG: 500 TABLET, CHEWABLE ORAL at 17:07

## 2023-01-05 RX ADMIN — ACETAMINOPHEN 650 MG: 325 TABLET ORAL at 09:19

## 2023-01-05 RX ADMIN — OMEPRAZOLE 40 MG: 20 CAPSULE, DELAYED RELEASE ORAL at 17:06

## 2023-01-05 RX ADMIN — POTASSIUM CHLORIDE: 2 INJECTION, SOLUTION, CONCENTRATE INTRAVENOUS at 16:38

## 2023-01-05 RX ADMIN — HYDROMORPHONE HYDROCHLORIDE 1 MG: 1 INJECTION, SOLUTION INTRAMUSCULAR; INTRAVENOUS; SUBCUTANEOUS at 21:54

## 2023-01-05 RX ADMIN — CEFEPIME 2 G: 2 INJECTION, POWDER, FOR SOLUTION INTRAVENOUS at 14:43

## 2023-01-05 RX ADMIN — POTASSIUM CHLORIDE: 2 INJECTION, SOLUTION, CONCENTRATE INTRAVENOUS at 03:21

## 2023-01-05 ASSESSMENT — PAIN DESCRIPTION - PAIN TYPE
TYPE: ACUTE PAIN

## 2023-01-05 ASSESSMENT — GAIT ASSESSMENTS: GAIT LEVEL OF ASSIST: UNABLE TO PARTICIPATE

## 2023-01-05 ASSESSMENT — COGNITIVE AND FUNCTIONAL STATUS - GENERAL
STANDING UP FROM CHAIR USING ARMS: A LOT
MOVING TO AND FROM BED TO CHAIR: UNABLE
CLIMB 3 TO 5 STEPS WITH RAILING: A LOT
TURNING FROM BACK TO SIDE WHILE IN FLAT BAD: A LOT
MOBILITY SCORE: 11
SUGGESTED CMS G CODE MODIFIER MOBILITY: CL
MOVING FROM LYING ON BACK TO SITTING ON SIDE OF FLAT BED: A LOT
WALKING IN HOSPITAL ROOM: A LOT

## 2023-01-05 NOTE — PROGRESS NOTES
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  Dilaudid PCA seems to control pain better.  Able to ambulate from bed to commode.    Review of Systems:     Constitutional: Afebrile, good appetite.  HENT: Negative for ear pain, nosebleeds, congestion, rhinorrhea, sore throat; sore tongue from hot food.  Eyes: Diplopia seems better.  Respiratory: Negative for shortness of breath or cough.   Cardiovascular: Negative for chest pain.   Gastrointestinal: Negative for nausea, vomiting, abdominal pain, diarrhea, constipation; tarry stool x 1.    Musculoskeletal: Diffuse leg pain persists.  Skin: Negative for rash or skin infection.  Neurological: Negative for focal weakness or headaches.    Psychiatric/Behavioral: Eager to get better and go home.     Medications:    Current Facility-Administered Medications:     dextrose 5 % and 0.45 % NaCl with KCl 30 mEq infusion, , Intravenous, Continuous, River Rodriguez M.D., Last Rate: 75 mL/hr at 23 1515, New Bag at 23 1515    oxymetazoline (AFRIN) 0.05 % nasal spray 2 Spray, 2  "Spray, Nasal, BID PRN, River Rodriguez M.D.    Pharmacy Consult Request ...Pain Management Review 1 Each, 1 Each, Other, PHARMACY TO DOSE, River Rodriguez M.D.    HYDROmorphone (DILAUDID) 0.2 mg/mL in 50 mL NS (PCA), , Intravenous, Continuous, River Rodriguez M.D., New Bag at 23 1701    acetaminophen (Tylenol) tablet 650 mg, 650 mg, Oral, Q6HRS, River Rodriguez M.D., 650 mg at 23 0837    omeprazole (PRILOSEC) capsule 40 mg, 40 mg, Oral, BID, Ramos Styles, D.O., 40 mg at 23 1700    calcium carbonate (Tums) chewable tab 1,000 mg, 1,000 mg, Oral, BID, Ramos Styles, D.O., 1,000 mg at 23 1700    acetaminophen (Tylenol) tablet 650 mg, 650 mg, Oral, Q4HRS PRN, Ramos Styles, D.O., 650 mg at 23 2138    cefepime (Maxipime) 2 g in  mL IVPB, 2 g, Intravenous, Q8HRS, Nova Louis M.D., Stopped at 23 1335    ondansetron (ZOFRAN) syringe/vial injection 4 mg, 4 mg, Intravenous, Q6HRS PRN, Caren Apodaca M.D., 4 mg at 22 1825    [DISCONTINUED] insulin regular (HumuLIN R,NovoLIN R) injection, 1-6 Units, Subcutaneous, Q6HRS, 1 Units at 23 1759 **AND** [CANCELED] POC blood glucose manual result, , , Q6H **AND** [CANCELED] NOTIFY MD and PharmD, , , Once **AND** Administer 20 grams of glucose (approximately 8 ounces of fruit juice) every 15 minutes PRN FSBG less than 70 mg/dL, , , PRN **AND** dextrose 10 % BOLUS 25 g, 25 g, Intravenous, Q15 MIN PRN, Erum Olivares M.D., Last Rate: 999 mL/hr at 22, 25 g at 22    OBJECTIVE:     Max Temp: Temp (24hrs), Av.1 °C (98.8 °F), Min:36.7 °C (98.1 °F), Max:37.9 °C (100.3 °F)      Vitals: BP (!) 133/93   Pulse (!) 118   Temp 37.9 °C (100.3 °F) (Oral)   Resp 16   Ht 1.651 m (5' 5\")   Wt 68.6 kg (151 lb 3.8 oz)   SpO2 94%   BMI 25.17 kg/m²       Intake/Output Summary (Last 24 hours) at 2023 1838  Last data filed at 2023 1130  Gross per 24 " "hour   Intake 688.75 ml   Output 2750 ml   Net -2061.25 ml       Labs:   Latest Reference Range & Units 01/03/23 02:50 01/04/23 00:35 01/04/23 12:33   WBC 4.8 - 10.8 K/uL 1.1 (LL) 1.2 (LL) 1.5 (LL)   RBC 4.70 - 6.10 M/uL 2.83 (L) 2.46 (L) 2.82 (L)   Hemoglobin 14.0 - 18.0 g/dL 8.0 (L) 7.0 (L) 8.2 (L)   Hematocrit 42.0 - 52.0 % 23.9 (L) 20.9 (L) 24.2 (L)   MCV 81.4 - 97.8 fL 84.5 85.0 85.8   MCH 27.0 - 33.0 pg 28.3 28.5 29.1   MCHC 33.7 - 35.3 g/dL 33.5 (L) 33.5 (L) 33.9   RDW 35.9 - 50.0 fL 51.4 (H) 51.4 (H) 49.8   Platelet Count 164 - 446 K/uL 43 (L) 24 (L) 49 (L)   Neutrophils-Polys 44.00 - 72.00 % 62.50 67.00 82.20 (H)   Neutrophils (Absolute) 1.82 - 7.42 K/uL 0.71 (L) 0.94 (L) 1.23 (L)   Bands-Stabs 0.00 - 10.00 % 1.70 11.30 (H)    Lymphocytes 22.00 - 41.00 % 25.00 16.50 (L) 11.90 (L)   Lymphs (Absolute) 1.00 - 4.80 K/uL 0.28 (L) 0.20 (L) 0.18 (L)   Monocytes 0.00 - 13.40 % 8.30 3.50 3.40   Metamyelocytes %  1.70 0.80   Myelocytes % 0.80  1.70   Progranulocytes % 1.70     Nucleated RBC /100 WBC 5.40 3.50 4.60     Physical Exam:    Constitutional: Alert/smiling (AM); sleepy, pleasant (PM)   HENT: Normocephalic and atraumatic. Alopecia.  No rhinorrhea. Oropharynx is clear and moist.   Eyes: Conjunctivae are normal. No scleral icterus.   Neck: Supple, no adenopathy.    Cardiovascular: 2/6 murmur at LUSB  Pulmonary/Chest: Effort normal. Symmetric expansion.  Clear to auscultation bilaterally.  Abdomen: Soft. Bowel sounds are normal. No distension, organomegaly or mass.     Genitourinary:  Deferred  Musculoskeletal: LE edema overall better (?) but L>R  Lymphadenopathy: No cervical, supraclavicular, or inguinal adenopathy.     Skin: Skin is warm, dry and pink.  No rash or evidence of skin infection. Port site clean and dry, accessed.     ASSESSMENT AND PLAN:     Tomas Roy \"JULY\" Estiven is a 22 y.o. male with [place] Chromosome Precursor B-Cell Acute Lymphoblastic Leukemia who is receiving therapy on " protocol [id number], who presented to the [facility] Emergency Department on Dec 27 with gram negative septic shock and DIC. Culture grew (within 7 hrs of drawing blood culture) E.coli and Klebsiella. Repeat blood culture ultimately no growth. Remains on Cefepime. Critically ill initially requiring intensive care but now much improved and hence transferred to floor on January 1.     WBC/ANC trending up. Patient feels overall improved but deconditioned. IVF at half maintenance .     E.coli and Klebsiella sepsis/septic shock in an immunocompromised host:  - Susceptible to cefepime, continue  - Currently off of pressors and stress dose hydrocortisone  - Repeat blood culture obtained 12/30/2022: no growth  - ID consulted for further input, they recommended removal of port when stable. However, we are hoping to maintain the CVL. Also, will F/U with ID regarding duration of therapy.     Ph+ Precursor B-Cell Acute Lymphoblastic Leukemia, in MRD Remission: Receiving therapy on [id number], AR Arm B, Delayed Intensification, Day 29 on hold  - HOLD imatinib given patient critically ill  - Due to begin 2nd half of DI yesterday, which will be delayed to provide time for complete recovery  - Patient's clinical status much improved and counts recovering,  no need to start G-CSF or granulocyte infusion  - HELD Bactrim (for PJP prophylaxis) this weekend given patient severely myelosuppressed. Restart next weekend when counts trend up.     Therapy-related pancytopenia: Total white cell count trending up  - Transfused pRBCs and platelets overnight (see results above)  - Use irradiated/leukoreduced products     DIC secondary to septic shock: Resolved  - s/p FFP and cryoprecipitate   - Fibrinogen level elevated (acute phase reactant?), PTT/PT normalized  - Bloody emesis/stool resolved  - Continue omeprazole for gastritis     At risk for DVT:  - Previously tx'ed with apixaban following asparaginase but held with this  episode  - Started SCDs yesterday (well tolerated)  - Repeat Doppler tonight (see below)     Tachycardia: (improving?)  - Likely from poor PO intake/diuresis and dehydration  - Restarted IVF fluid at half maintenance  - Monitor I/O and hemoglobin level  - Systolic murmur noted today; will monitor, consider repeat echocardiogram     Pulmonary edema/Anasarca due to fluid overload from massive transfusion/fluid bolus and capillary leak from sepsis  - Off supplemental oxygen  - Off of scheduled lasix      Leg pain: likely from deconditioning and injury to muscle due to decreased blood supply from septic shock/lactic acidosis  - Responding well to dilaudid PCA  - Encourage physical therapy, consulted PT  - Edema is subtly asymmetric; repeat doppler US to r/o DVT vs abscess.       Discussed with nursing staff.  Total time today approx 45 minutes.    ANN MARIE Rodriguez MD  Pediatric Hematology / Oncology  [facility]  Cell. [phone]  Office. [phone]

## 2023-01-05 NOTE — CARE PLAN
The patient is Watcher - Medium risk of patient condition declining or worsening    Shift Goals  Clinical Goals: VSS, afebrile, pain control, monitor labs  Patient Goals: pain control, sleep  Family Goals: wants pt to be comfortable and to rest    Progress made toward(s) clinical / shift goals:      Problem: Knowledge Deficit - Standard  Goal: Patient and family/care givers will demonstrate understanding of plan of care, disease process/condition, diagnostic tests and medications  Outcome: Progressing  Note: A/Ox4, pt is able to understand plan of care. All questions answered at the moment.       Problem: Pain - Standard  Goal: Alleviation of pain or a reduction in pain to the patient’s comfort goal  Outcome: Progressing  Note: PRN pain medications given per MAR working effectively to promote comfort.        Problem: Skin Integrity  Goal: Skin integrity is maintained or improved  Outcome: Progressing     Problem: Fall Risk  Goal: Patient will remain free from falls  Outcome: Progressing     Problem: Bowel Elimination  Goal: Establish and maintain regular bowel function  Outcome: Progressing       Patient is not progressing towards the following goals:

## 2023-01-05 NOTE — PROGRESS NOTES
Pt removed large blood clot from mouth. MD Rodriguez notified. Pt is not actively bleeding. No new orders at this time.

## 2023-01-06 LAB
BASOPHILS # BLD AUTO: 0 % (ref 0–1.8)
BASOPHILS # BLD: 0 K/UL (ref 0–0.12)
EOSINOPHIL # BLD AUTO: 0 K/UL (ref 0–0.51)
EOSINOPHIL NFR BLD: 0 % (ref 0–6.9)
ERYTHROCYTE [DISTWIDTH] IN BLOOD BY AUTOMATED COUNT: 50.8 FL (ref 35.9–50)
HCT VFR BLD AUTO: 24.3 % (ref 42–52)
HGB BLD-MCNC: 8.1 G/DL (ref 14–18)
LYMPHOCYTES # BLD AUTO: 0.24 K/UL (ref 1–4.8)
LYMPHOCYTES NFR BLD: 12.7 % (ref 22–41)
MANUAL DIFF BLD: NORMAL
MCH RBC QN AUTO: 28.6 PG (ref 27–33)
MCHC RBC AUTO-ENTMCNC: 33.3 G/DL (ref 33.7–35.3)
MCV RBC AUTO: 85.9 FL (ref 81.4–97.8)
METAMYELOCYTES NFR BLD MANUAL: 2.7 %
MONOCYTES # BLD AUTO: 0 K/UL (ref 0–0.85)
MONOCYTES NFR BLD AUTO: 0 % (ref 0–13.4)
MORPHOLOGY BLD-IMP: NORMAL
NEUTROPHILS # BLD AUTO: 1.61 K/UL (ref 1.82–7.42)
NEUTROPHILS NFR BLD: 83.7 % (ref 44–72)
NEUTS BAND NFR BLD MANUAL: 0.9 % (ref 0–10)
NRBC # BLD AUTO: 0.09 K/UL
NRBC BLD-RTO: 4.7 /100 WBC
PLATELET # BLD AUTO: 45 K/UL (ref 164–446)
PLATELET BLD QL SMEAR: NORMAL
PMV BLD AUTO: 11.3 FL (ref 9–12.9)
RBC # BLD AUTO: 2.83 M/UL (ref 4.7–6.1)
RBC BLD AUTO: NORMAL
WBC # BLD AUTO: 1.9 K/UL (ref 4.8–10.8)

## 2023-01-06 PROCEDURE — 99232 SBSQ HOSP IP/OBS MODERATE 35: CPT | Performed by: PEDIATRICS

## 2023-01-06 PROCEDURE — 85025 COMPLETE CBC W/AUTO DIFF WBC: CPT

## 2023-01-06 PROCEDURE — 700102 HCHG RX REV CODE 250 W/ 637 OVERRIDE(OP): Performed by: HOSPITALIST

## 2023-01-06 PROCEDURE — 700101 HCHG RX REV CODE 250: Performed by: PEDIATRICS

## 2023-01-06 PROCEDURE — A9270 NON-COVERED ITEM OR SERVICE: HCPCS | Performed by: PEDIATRICS

## 2023-01-06 PROCEDURE — A9270 NON-COVERED ITEM OR SERVICE: HCPCS | Performed by: HOSPITALIST

## 2023-01-06 PROCEDURE — 700111 HCHG RX REV CODE 636 W/ 250 OVERRIDE (IP): Performed by: INTERNAL MEDICINE

## 2023-01-06 PROCEDURE — 85007 BL SMEAR W/DIFF WBC COUNT: CPT

## 2023-01-06 PROCEDURE — 770004 HCHG ROOM/CARE - ONCOLOGY PRIVATE *

## 2023-01-06 PROCEDURE — 700111 HCHG RX REV CODE 636 W/ 250 OVERRIDE (IP): Performed by: PEDIATRICS

## 2023-01-06 PROCEDURE — 700105 HCHG RX REV CODE 258: Performed by: INTERNAL MEDICINE

## 2023-01-06 PROCEDURE — 700105 HCHG RX REV CODE 258: Performed by: PEDIATRICS

## 2023-01-06 PROCEDURE — 700102 HCHG RX REV CODE 250 W/ 637 OVERRIDE(OP): Performed by: PEDIATRICS

## 2023-01-06 RX ORDER — ECHINACEA PURPUREA EXTRACT 125 MG
2 TABLET ORAL
Status: DISCONTINUED | OUTPATIENT
Start: 2023-01-06 | End: 2023-01-14 | Stop reason: HOSPADM

## 2023-01-06 RX ADMIN — APIXABAN 2.5 MG: 5 TABLET, FILM COATED ORAL at 14:16

## 2023-01-06 RX ADMIN — CEFEPIME 2 G: 2 INJECTION, POWDER, FOR SOLUTION INTRAVENOUS at 22:39

## 2023-01-06 RX ADMIN — CEFEPIME 2 G: 2 INJECTION, POWDER, FOR SOLUTION INTRAVENOUS at 13:41

## 2023-01-06 RX ADMIN — ACETAMINOPHEN 650 MG: 325 TABLET ORAL at 17:14

## 2023-01-06 RX ADMIN — CALCIUM CARBONATE 1000 MG: 500 TABLET, CHEWABLE ORAL at 17:14

## 2023-01-06 RX ADMIN — OMEPRAZOLE 40 MG: 20 CAPSULE, DELAYED RELEASE ORAL at 05:17

## 2023-01-06 RX ADMIN — CEFEPIME 2 G: 2 INJECTION, POWDER, FOR SOLUTION INTRAVENOUS at 05:19

## 2023-01-06 RX ADMIN — ACETAMINOPHEN 650 MG: 325 TABLET ORAL at 11:11

## 2023-01-06 RX ADMIN — OMEPRAZOLE 40 MG: 20 CAPSULE, DELAYED RELEASE ORAL at 17:14

## 2023-01-06 RX ADMIN — CALCIUM CARBONATE 1000 MG: 500 TABLET, CHEWABLE ORAL at 10:00

## 2023-01-06 RX ADMIN — POTASSIUM CHLORIDE: 2 INJECTION, SOLUTION, CONCENTRATE INTRAVENOUS at 20:37

## 2023-01-06 RX ADMIN — HYDROMORPHONE HYDROCHLORIDE: 10 INJECTION, SOLUTION INTRAMUSCULAR; INTRAVENOUS; SUBCUTANEOUS at 11:41

## 2023-01-06 ASSESSMENT — PAIN DESCRIPTION - PAIN TYPE
TYPE: ACUTE PAIN

## 2023-01-06 NOTE — PROGRESS NOTES
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  He is using his \"demand\" PCA dose very sparingly and pain is generally tolerable with Dilaudid at 0.4 mg/hr.  He was able to take a few steps today and is eager for PT to come around later for more \"work.\"    He did \"gag up\" a large blood clot earlier this morning after some \"aggressive\" nasal hygiene.  He reports better breathing and an improvement in his sense of smell.    Otherwise, no active bleeding.    Review of Systems:     Constitutional: Afebrile, good appetite.  HENT: Negative for nosebleeds, rhinorrhea, sore throat, mouth sores.  Eyes: Diplopia nearly resolved.  Respiratory: Negative for shortness of breath or cough.   Cardiovascular: Negative for chest pain.    Gastrointestinal: Negative for nausea, vomiting, abdominal pain, diarrhea, constipation; stool was brown (not black!) this morning.  .    Skin: Negative for rash or skin infection..  Neurological: Negative for numbness, tingling, sensory changes, weakness or headaches.    Psychiatric/Behavioral: Eager " to get better and go home     Medications:    Current Facility-Administered Medications:     HYDROmorphone (DILAUDID) 0.2 mg/mL in 50 mL NS (PCA), , Intravenous, Continuous, River Rodriguez M.D., Rate Verify at 23 1906    dextrose 5 % and 0.45 % NaCl with KCl 30 mEq infusion, , Intravenous, Continuous, River Rodriguez M.D., Last Rate: 75 mL/hr at 23 1638, New Bag at 23 1638    oxymetazoline (AFRIN) 0.05 % nasal spray 2 Spray, 2 Spray, Nasal, BID PRN, River Rodriguez M.D.    Pharmacy Consult Request ...Pain Management Review 1 Each, 1 Each, Other, PHARMACY TO DOSE, River Rodriguez M.D.    acetaminophen (Tylenol) tablet 650 mg, 650 mg, Oral, Q6HRS, River Rodriguez M.D., 650 mg at 23 0919    omeprazole (PRILOSEC) capsule 40 mg, 40 mg, Oral, BID, Ramos Styles, D.O., 40 mg at 23 1706    calcium carbonate (Tums) chewable tab 1,000 mg, 1,000 mg, Oral, BID, Ramos Styles, D.O., 1,000 mg at 23 1707    acetaminophen (Tylenol) tablet 650 mg, 650 mg, Oral, Q4HRS PRN, Ramos Styles D.O., 650 mg at 23    cefepime (Maxipime) 2 g in  mL IVPB, 2 g, Intravenous, Q8HRS, Nova Louis M.D., Stopped at 23 1513    ondansetron (ZOFRAN) syringe/vial injection 4 mg, 4 mg, Intravenous, Q6HRS PRN, Caren Apodaca M.D., 4 mg at 22 1825    [DISCONTINUED] insulin regular (HumuLIN R,NovoLIN R) injection, 1-6 Units, Subcutaneous, Q6HRS, 1 Units at 23 1759 **AND** [CANCELED] POC blood glucose manual result, , , Q6H **AND** [CANCELED] NOTIFY MD and PharmD, , , Once **AND** Administer 20 grams of glucose (approximately 8 ounces of fruit juice) every 15 minutes PRN FSBG less than 70 mg/dL, , , PRN **AND** dextrose 10 % BOLUS 25 g, 25 g, Intravenous, Q15 MIN PRN, Erum Olivares M.D., Last Rate: 999 mL/hr at 22, 25 g at 22    OBJECTIVE:     Max Temp: Temp (24hrs), Av.3 °C (99.2 °F),  "Min:36.9 °C (98.5 °F), Max:38.3 °C (100.9 °F)      Vitals: BP (!) 145/79   Pulse (!) 118 Comment: 154 EOB, reduced to 134 with 2 min seated rest, 118 supine  Temp 37.1 °C (98.7 °F) (Oral)   Resp 17   Ht 1.651 m (5' 5\")   Wt 68.6 kg (151 lb 3.8 oz)   SpO2 96%   BMI 25.17 kg/m²       Intake/Output Summary (Last 24 hours) at 1/5/2023 1914  Last data filed at 1/5/2023 1905  Gross per 24 hour   Intake 99.7 ml   Output 7125 ml   Net -7025.3 ml       Labs:   Latest Reference Range & Units 01/04/23 12:33 01/05/23 00:40   WBC 4.8 - 10.8 K/uL 1.5 (LL) 1.7 (LL)   RBC 4.70 - 6.10 M/uL 2.82 (L) 2.72 (L)   Hemoglobin 14.0 - 18.0 g/dL 8.2 (L) 7.9 (L)   Hematocrit 42.0 - 52.0 % 24.2 (L) 23.5 (L)   MCV 81.4 - 97.8 fL 85.8 86.4   MCH 27.0 - 33.0 pg 29.1 29.0   MCHC 33.7 - 35.3 g/dL 33.9 33.6 (L)   RDW 35.9 - 50.0 fL 49.8 50.8 (H)   Platelet Count 164 - 446 K/uL 49 (L) 42 (L)   MPV 9.0 - 12.9 fL 9.3 10.1   Neutrophils-Polys 44.00 - 72.00 % 82.20 (H) 80.90 (H)   Neutrophils (Absolute) 1.82 - 7.42 K/uL 1.23 (L) 1.38 (L)   Lymphocytes 22.00 - 41.00 % 11.90 (L) 11.30 (L)   Lymphs (Absolute) 1.00 - 4.80 K/uL 0.18 (L) 0.19 (L)   Monocytes 0.00 - 13.40 % 3.40 5.20   Metamyelocytes % 0.80 1.70   Myelocytes % 1.70 0.90   Nucleated RBC /100 WBC 4.60 4.80     Physical Exam:    Constitutional: Pleasant, less fatigued   HENT: Normocephalic and atraumatic. Alopecia.  No rhinorrhea. Oropharynx is clear and moist.   Eyes: Conjunctivae are normal. No scleral icterus.   Neck: Supple, no adenopathy.    Cardiovascular: RRR with \"harsh\" 3/6 systolic murmur at LUSB  Pulmonary/Chest: Effort normal. Symmetric expansion.  Clear to auscultation bilaterally.  Abdomen: Soft. Bowel sounds are normal. No distension, organomegaly or mass.     Genitourinary:  Deferred  Musculoskeletal: LE edema somewhat improved.  Skin: Skin is warm, dry and pink.  No rash or evidence of skin infection. Port site clean and dry, accessed.     ASSESSMENT AND PLAN:     Tomas" "Kirill \"JULY\" Estiven is a 22 y.o. male with [place] Chromosome Precursor B-Cell Acute Lymphoblastic Leukemia who is receiving therapy on protocol [id number], who presented to the [facility] Emergency Department on Dec 27 with gram negative septic shock and DIC. Culture grew (within 7 hrs of drawing blood culture) E.coli and Klebsiella. Repeat blood culture ultimately no growth. Remains on Cefepime. Critically ill initially requiring intensive care but now much improved and hence transferred to floor on January 1.     WBC/ANC trending up. Patient feels overall improved but deconditioned. IVF at half maintenance .     E.coli and Klebsiella sepsis/septic shock in an immunocompromised host:  - Susceptible to cefepime, continue  - Currently off of pressors and stress dose hydrocortisone  - Repeat blood culture obtained 12/30/2022: no growth  - ID consulted for further input, they recommended removal of port when stable. However, we are hoping to maintain the CVL. Also, will F/U with ID regarding duration of therapy.    - This morning, we discussed possible transfer to inpatient rehabilitation.  Our collective opinion (JULY, his mother, myself) is that his recovery should move quickly enough that arranging for inpatient rehabilitation is likely not necessary.  We should do what we can to maximize his therapies while he remains here on the C[facility].    - In anticipation of eventual discharge, I placed orders today for a walker and a shower chair for use at home.     Ph+ Precursor B-Cell Acute Lymphoblastic Leukemia, in MRD Remission: Receiving therapy on [id number], AR Arm B, Delayed Intensification, Day 29 on hold  - HOLD imatinib given patient critically ill  - Due to begin 2nd half of DI yesterday, which will be delayed to provide time for complete recovery  - Patient's clinical status much improved and counts recovering,  no need to start G-CSF or granulocyte infusion  - HELD Bactrim (for PJP " prophylaxis) this weekend given patient severely myelosuppressed. Restart next weekend when counts trend up.     Therapy-related pancytopenia: Total white cell count trending up  - Transfused pRBCs and platelets yesterday: Hgb and platelets fairly stable o/n  - Use irradiated/leukoreduced products     DIC secondary to septic shock: Resolved  - s/p FFP and cryoprecipitate   - Fibrinogen level elevated (acute phase reactant?), PTT/PT normalized  - Bloody emesis/stool resolved  - Continue omeprazole for gastritis     At risk for DVT:  - Previously tx'ed with apixaban following asparaginase but held with this episode  - Started SCDs yesterday (well tolerated)  - Repeat Doppler today shows no evidence of DVT     Tachycardia: (improving?)  - Likely from poor PO intake/diuresis and dehydration  - Restarted IVF fluid at half maintenance  - Monitor I/O and hemoglobin level  - Systolic murmur noted again today: character is worrisome  - Echo shows no evidence of endocarditis     Pulmonary edema/Anasarca due to fluid overload from massive transfusion/fluid bolus and capillary leak from sepsis  - Off supplemental oxygen  - Off of scheduled lasix      Leg pain: likely from deconditioning and injury to muscle due to decreased blood supply from septic shock/lactic acidosis  - Responding well to dilaudid PCA  - Encourage physical therapy, consulted PT       Discussed with nursing staff, PT, patient's mother.  Total time today approx 40 minutes.    ANN MARIE Rodriguez MD  Pediatric Hematology / Oncology  [facility]  Cell. [phone]  Office. [phone]

## 2023-01-06 NOTE — CARE PLAN
The patient is Watcher - Medium risk of patient condition declining or worsening    Shift Goals  Clinical Goals: VSS, afebrile, pain control, monitor labs  Patient Goals: pain control, shower  Family Goals: wants pt to be comfortable and to rest    Progress made toward(s) clinical / shift goals:    Problem: Knowledge Deficit - Standard  Goal: Patient and family/care givers will demonstrate understanding of plan of care, disease process/condition, diagnostic tests and medications  Outcome: Progressing  Note: Patients pain is being managed by dilaudid drip, decreased to 0.3mg basal rate. On tele monitor SR-ST. Bilateral lower extremity ultrasound and echo completed. Noted with appetite, mother brought in food. Worked with PT/OT. Noted with one episode of blood clot when blowing nose, no active bleeding noted.        Patient is not progressing towards the following goals:

## 2023-01-06 NOTE — PROGRESS NOTES
Patient febrile, temperature 100.6 F oral. Patient medicated per MAR. MD Rodriguez updated.   Patient reporting pain 9/10 in legs despite dilaudid PCA. MD Rodriguez updated, a one time dose of 1 mg dilaudid IV ordered by MD. Patient reports some relief with intervention.

## 2023-01-06 NOTE — FACE TO FACE
Face to Face Note  -  Durable Medical Equipment    River Rodriguez M.D. - NPI: [id number]  I certify that this patient is under my care and that they had a durable medical equipment(DME)face to face encounter by myself that meets the physician DME face-to-face encounter requirements with this patient on:    Date of encounter:   Patient:                    MRN:                       YOB: 2022  Tomas KAT Jean-Baptiste  [id number]  2000     The encounter with the patient was in whole, or in part, for the following medical condition, which is the primary reason for durable medical equipment:  Other - Recovery from sepsis / general deconditioning    I certify that, based on my findings, the following durable medical equipment is medically necessary:    Walkers and Other DME Equipment - Shower chair .    My Clinical findings support the need for the above equipment due to:  Abnormal Gait, Other - Lower extremity swelling/myositis

## 2023-01-06 NOTE — CARE PLAN
The patient is Watcher - Medium risk of patient condition declining or worsening    Shift Goals  Clinical Goals: safety, monitor vitals and labs, pain control  Patient Goals: pain control, sleep  Family Goals: wants pt to be comfortable and to rest    Progress made toward(s) clinical / shift goals:    Problem: Knowledge Deficit - Standard  Goal: Patient and family/care givers will demonstrate understanding of plan of care, disease process/condition, diagnostic tests and medications  Outcome: Progressing  Note: Patient A&Ox4, family at bedside. Updated on plan of care. Agreeable to plan at this time. Hourly rounding in place.      Problem: Pain - Standard  Goal: Alleviation of pain or a reduction in pain to the patient’s comfort goal  Outcome: Progressing  Note: Patient in pain, dilaudid PCA in place. One time dose of 1 mg dilaudid administered. Patient reports relief with intervention. Patient declines nonpharmacologic intervention at this time.      Problem: Skin Integrity  Goal: Skin integrity is maintained or improved  Outcome: Progressing  Patient turning self appropriately      Problem: Fall Risk  Goal: Patient will remain free from falls  Outcome: Progressing  Patient educated on fall risk. Patient up x1 person assist. Patient refusing bed alarm at this time, family at bedside. Patient calling appropriately for assistance. Call light, personal belongings within reach. Frequent toileting offered. Hourly rounding in place.        Patient is not progressing towards the following goals:  Problem: Infection - Standard  Goal: Patient will remain free from infection  Outcome: Not Progressing  Note: Patient febrile this shift, T max 100.6 MD notified.

## 2023-01-06 NOTE — THERAPY
"Physical Therapy   Daily Treatment     Patient Name: Tomas Jean-Baptiste  Age:  22 y.o., Sex:  male  Medical Record #: [id number]  Today's Date: 1/5/2023     Precautions  Precautions: Fall Risk  Comments: B calf pain L>R    Assessment    Pt was motivated and demonstrated a progression from EOB therapeutic activities to pre-gait activities in standing with a FWW and CGA. Pt fatigued quickly during supine to sit transfer and during standing activity, which improved with seated rest. Pt reported B calf pain (L>R) throughout the treatment session, which was exacerbated in standing and the pt was unable to attempt placing weight through the L LE. Pt also unable to tolerate SCD due to calf pain. Pt recommended to continue current course of acute IP PT services. At this time, recommend placement, unless family able to provide assist for all OOB mobility at current level.    Plan    Physical Therapy Treatment Plan  Physical Therapy Treatment Plan: Continue Current Treatment Plan    DC Equipment Recommendations: Front-Wheel Walker  Discharge Recommendations: Recommend post-acute placement for additional physical therapy services prior to discharge home (vs home with current level of assist required)      Subjective    \"Any progress is good progress\"     Objective       01/05/23 1631   Vitals   Pulse (!) 118  (154 EOB, reduced to 134 with 2 min seated rest, 118 supine)   O2 (LPM) 0   O2 Delivery Device None - Room Air   Pain 0 - 10 Group   Therapist Pain Assessment Nurse Notified  (pt reported B calf pain during ankle pumps and transfer from supine to EOB and back; pt unable to bear weight through L LE in standing with FWW due to calf pain)   Cognition    Cognition / Consciousness WDL   Level of Consciousness Alert   Comments Pleasant and cooperative. Did not recall therex review from previous session   Active ROM Lower Body    Active ROM Lower Body  X   Comments unable to bring legs to EOB without using his hands, " unable to perform full LAQ. Restricted AROM B ankle DF and PF (ankle pumps) 2/2 to weakness and calf pain   Strength Lower Body   Lower Body Strength  X   Comments Grossly BLE 2-/5, however, able to hold weight in standing with UE support through FWW   Supine Lower Body Exercise   Supine Lower Body Exercises Yes   Comments Pt provided therex handout (supine LE exercise handout) which was reviewed with patient   Sitting Lower Body Exercises   Comments   (verbally reviewed seated LAQ and ankle pumps)   Balance   Sitting Balance (Static) Fair   Sitting Balance (Dynamic) Fair -   Standing Balance (Static) Poor   Standing Balance (Dynamic) Poor   Weight Shift Sitting Fair   Weight Shift Standing Poor   Skilled Intervention Tactile Cuing;Verbal Cuing  (w/ FWW)   Bed Mobility    Supine to Sit Minimal Assist   Sit to Supine Moderate Assist   Scooting Moderate Assist   Skilled Intervention Tactile Cuing;Verbal Cuing  (required increased time and effort due to pain)   Gait Analysis   Gait Level Of Assist Unable to Participate  (2/2 to fatigue)   Comments pre-gait step forward and backward with FWW and CGA   Functional Mobility   Sit to Stand Minimal Assist   Mobility supine to sit, sit to stand with FWW, return to supine   Skilled Intervention Tactile Cuing;Verbal Cuing;Compensatory Strategies   How much difficulty does the patient currently have...   Turning over in bed (including adjusting bedclothes, sheets and blankets)? 2   Sitting down on and standing up from a chair with arms (e.g., wheelchair, bedside commode, etc.) 2   Moving from lying on back to sitting on the side of the bed? 1   How much help from another person does the patient currently need...   Moving to and from a bed to a chair (including a wheelchair)? 2   Need to walk in a hospital room? 2   Climbing 3-5 steps with a railing? 2   6 clicks Mobility Score 11   Activity Tolerance   Sitting Edge of Bed 8 min   Standing <1 min   Comments limited 2/2 to fatigue  and pain   Patient / Family Goals    Patient / Family Goal #1 To walk again   Goal #1 Outcome Progressing as expected   Short Term Goals    Short Term Goal # 1 Pt will perform supine <> sit with SPV in 6 visits to return to PLOF   Goal Outcome # 1 goal not met   Short Term Goal # 2 Pt will perform STS transfer with FWW and SPV in 6 visits to improve functional transfers   Goal Outcome # 2 Goal not met   Short Term Goal # 3 Pt will ambulate 150ft with FWW and SPV in 6 visits to access household distances   Goal Outcome # 3 Goal not met   Short Term Goal # 4 Pt will negotiate 2 steps with Adin in 6 visits to safely enter/exit home   Goal Outcome # 4 Goal not met   Education Group   Education Provided Use of Assistive Device;Exercises - Supine;Exercises - Seated   Role of Physical Therapist Patient Response Patient;Acceptance;Explanation;Verbal Demonstration   Use of Assistive Device Patient Response Patient;Acceptance;Explanation;Action Demonstration   Exercises - Supine Patient Response Patient;Acceptance;Explanation;Verbal Demonstration;Handout   Exercises - Seated Patient Response Patient;Acceptance;Explanation;Verbal Demonstration   Additional Comments pt educated on frequency of exercises, 3x daily minimum   Physical Therapy Treatment Plan   Physical Therapy Treatment Plan Continue Current Treatment Plan   Anticipated Discharge Equipment and Recommendations   DC Equipment Recommendations Front-Wheel Walker   Discharge Recommendations Recommend post-acute placement for additional physical therapy services prior to discharge home  (vs home with current level of assist required)   Interdisciplinary Plan of Care Collaboration   IDT Collaboration with  Nursing   Patient Position at End of Therapy In Bed;Call Light within Reach;Tray Table within Reach;Phone within Reach   Collaboration Comments RN updated   Session Information   Date / Session Number  1/5- 2 (2/3, 1/9)

## 2023-01-07 LAB
BASOPHILS # BLD AUTO: 0.8 % (ref 0–1.8)
BASOPHILS # BLD: 0.01 K/UL (ref 0–0.12)
EOSINOPHIL # BLD AUTO: 0.02 K/UL (ref 0–0.51)
EOSINOPHIL NFR BLD: 0.9 % (ref 0–6.9)
ERYTHROCYTE [DISTWIDTH] IN BLOOD BY AUTOMATED COUNT: 51.8 FL (ref 35.9–50)
HCT VFR BLD AUTO: 24.4 % (ref 42–52)
HGB BLD-MCNC: 8.2 G/DL (ref 14–18)
LYMPHOCYTES # BLD AUTO: 0.2 K/UL (ref 1–4.8)
LYMPHOCYTES NFR BLD: 11.8 % (ref 22–41)
MANUAL DIFF BLD: NORMAL
MCH RBC QN AUTO: 29.2 PG (ref 27–33)
MCHC RBC AUTO-ENTMCNC: 33.6 G/DL (ref 33.7–35.3)
MCV RBC AUTO: 86.8 FL (ref 81.4–97.8)
METAMYELOCYTES NFR BLD MANUAL: 0.8 %
MONOCYTES # BLD AUTO: 0.13 K/UL (ref 0–0.85)
MONOCYTES NFR BLD AUTO: 7.6 % (ref 0–13.4)
MORPHOLOGY BLD-IMP: NORMAL
MYELOCYTES NFR BLD MANUAL: 0.8 %
NEUTROPHILS # BLD AUTO: 1.31 K/UL (ref 1.82–7.42)
NEUTROPHILS NFR BLD: 77.3 % (ref 44–72)
NRBC # BLD AUTO: 0.04 K/UL
NRBC BLD-RTO: 2.4 /100 WBC
PLATELET # BLD AUTO: 43 K/UL (ref 164–446)
PMV BLD AUTO: 10.9 FL (ref 9–12.9)
RBC # BLD AUTO: 2.81 M/UL (ref 4.7–6.1)
WBC # BLD AUTO: 1.7 K/UL (ref 4.8–10.8)

## 2023-01-07 PROCEDURE — 700102 HCHG RX REV CODE 250 W/ 637 OVERRIDE(OP): Performed by: HOSPITALIST

## 2023-01-07 PROCEDURE — 700102 HCHG RX REV CODE 250 W/ 637 OVERRIDE(OP): Performed by: PEDIATRICS

## 2023-01-07 PROCEDURE — 700101 HCHG RX REV CODE 250: Performed by: PEDIATRICS

## 2023-01-07 PROCEDURE — 770004 HCHG ROOM/CARE - ONCOLOGY PRIVATE *

## 2023-01-07 PROCEDURE — A9270 NON-COVERED ITEM OR SERVICE: HCPCS | Performed by: HOSPITALIST

## 2023-01-07 PROCEDURE — 85007 BL SMEAR W/DIFF WBC COUNT: CPT

## 2023-01-07 PROCEDURE — A9270 NON-COVERED ITEM OR SERVICE: HCPCS | Performed by: PEDIATRICS

## 2023-01-07 PROCEDURE — 700105 HCHG RX REV CODE 258: Performed by: INTERNAL MEDICINE

## 2023-01-07 PROCEDURE — 99232 SBSQ HOSP IP/OBS MODERATE 35: CPT | Performed by: PEDIATRICS

## 2023-01-07 PROCEDURE — 700111 HCHG RX REV CODE 636 W/ 250 OVERRIDE (IP): Performed by: PEDIATRICS

## 2023-01-07 PROCEDURE — 700111 HCHG RX REV CODE 636 W/ 250 OVERRIDE (IP): Performed by: INTERNAL MEDICINE

## 2023-01-07 PROCEDURE — 85025 COMPLETE CBC W/AUTO DIFF WBC: CPT

## 2023-01-07 RX ORDER — IMATINIB MESYLATE 100 MG/1
200 TABLET, FILM COATED ORAL EVERY EVENING
Status: DISCONTINUED | OUTPATIENT
Start: 2023-01-07 | End: 2023-01-14 | Stop reason: HOSPADM

## 2023-01-07 RX ORDER — IMATINIB MESYLATE 400 MG/1
400 TABLET, FILM COATED ORAL EVERY MORNING
Status: DISCONTINUED | OUTPATIENT
Start: 2023-01-07 | End: 2023-01-14 | Stop reason: HOSPADM

## 2023-01-07 RX ADMIN — CALCIUM CARBONATE 1000 MG: 500 TABLET, CHEWABLE ORAL at 10:24

## 2023-01-07 RX ADMIN — CEFEPIME 2 G: 2 INJECTION, POWDER, FOR SOLUTION INTRAVENOUS at 05:39

## 2023-01-07 RX ADMIN — OMEPRAZOLE 40 MG: 20 CAPSULE, DELAYED RELEASE ORAL at 16:27

## 2023-01-07 RX ADMIN — CEFEPIME 2 G: 2 INJECTION, POWDER, FOR SOLUTION INTRAVENOUS at 14:14

## 2023-01-07 RX ADMIN — HYDROMORPHONE HYDROCHLORIDE: 10 INJECTION, SOLUTION INTRAMUSCULAR; INTRAVENOUS; SUBCUTANEOUS at 16:28

## 2023-01-07 RX ADMIN — OMEPRAZOLE 40 MG: 20 CAPSULE, DELAYED RELEASE ORAL at 05:37

## 2023-01-07 RX ADMIN — ACETAMINOPHEN 650 MG: 325 TABLET ORAL at 16:34

## 2023-01-07 RX ADMIN — HYDROMORPHONE HYDROCHLORIDE: 10 INJECTION, SOLUTION INTRAMUSCULAR; INTRAVENOUS; SUBCUTANEOUS at 14:30

## 2023-01-07 RX ADMIN — IMATINIB MESYLATE 200 MG: 100 TABLET, FILM COATED ORAL at 20:41

## 2023-01-07 RX ADMIN — ACETAMINOPHEN 650 MG: 325 TABLET ORAL at 10:24

## 2023-01-07 RX ADMIN — ACETAMINOPHEN 650 MG: 325 TABLET ORAL at 00:12

## 2023-01-07 RX ADMIN — CEFEPIME 2 G: 2 INJECTION, POWDER, FOR SOLUTION INTRAVENOUS at 22:23

## 2023-01-07 RX ADMIN — ACETAMINOPHEN 650 MG: 325 TABLET ORAL at 05:37

## 2023-01-07 RX ADMIN — IMATINIB MESYLATE 400 MG: 400 TABLET, FILM COATED ORAL at 17:45

## 2023-01-07 RX ADMIN — CALCIUM CARBONATE 1000 MG: 500 TABLET, CHEWABLE ORAL at 16:27

## 2023-01-07 RX ADMIN — POTASSIUM CHLORIDE: 2 INJECTION, SOLUTION, CONCENTRATE INTRAVENOUS at 17:48

## 2023-01-07 ASSESSMENT — PAIN DESCRIPTION - PAIN TYPE
TYPE: ACUTE PAIN

## 2023-01-07 NOTE — CARE PLAN
The patient is Watcher - Medium risk of patient condition declining or worsening    Shift Goals  Clinical Goals: safety, remain afebrile, pain control, monitor labs and vitals  Patient Goals: pain control, rest  Family Goals: wants pt to be comfortable and to rest    Progress made toward(s) clinical / shift goals:    Problem: Knowledge Deficit - Standard  Goal: Patient and family/care givers will demonstrate understanding of plan of care, disease process/condition, diagnostic tests and medications  Outcome: Progressing  Patient &Ox4, family at bedside. Patient updated on plan of care. Agreeable to plan at this time, all questions answered. Patient calling to make needs known.      Problem: Pain - Standard  Goal: Alleviation of pain or a reduction in pain to the patient’s comfort goal  Outcome: Progressing  Patient reports pain dilaudid PCA in place. Patient declines nonpharmacologic intervention at this time.      Problem: Skin Integrity  Goal: Skin integrity is maintained or improved  Outcome: Progressing  Patient turning and repositioning self Q2. Pillows in use for support and positioning.      Problem: Fall Risk  Goal: Patient will remain free from falls  Outcome: Progressing  Patient educated on fall risk. Patient refusing use of bed alarm at this time. Patient calling appropriately. Patient up x1 to BSC. Frequent toileting offered. Call light and belongings within reach. Condom cath in use related to frequency and urgency. Hourly rounding in place.      Problem: Psychosocial  Goal: Patient's level of anxiety will decrease  Outcome: Progressing     Problem: Communication  Goal: The ability to communicate needs accurately and effectively will improve  Outcome: Progressing     Problem: Mobility  Goal: Patient's capacity to carry out activities will improve  Outcome: Progressing  Patient up x1 to BSC.      Problem: Self Care  Goal: Patient will have the ability to perform ADLs independently or with assistance  (bathe, groom, dress, toilet and feed)  Outcome: Progressing     Problem: Infection - Standard  Goal: Patient will remain free from infection  Outcome: Progressing  Patient remained afebrile this shift.        Patient is not progressing towards the following goals:

## 2023-01-07 NOTE — PROGRESS NOTES
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  He has requested removal of his SCDs.  Last night, he required a one-time \"bolus\" dose of Dilaudid 1 mg IV and he has been using his \"demand\" PCA button more frequently, but his Dilaudid infusion continues at only 0.3 mg/h.  For now, he feels that this is adequate/appropriate.    Mobility continues to improve slowly.  JULY is hoping to take a shower today, whereas he was too fatigued last night after physical therapy to attempt this, as originally planned.    He continues to cough up small blood clots, but no active bleeding from nose or mouth.    Review of Systems:     Constitutional: Low-grade fever x 1 last night.  Good appetite.  HENT: Negative for nosebleeds, congestion, rhinorrhea, sore throat, mouth sores.  Respiratory: Negative for shortness of breath or cough.   Cardiovascular: Negative for chest pain, palpitations.    Gastrointestinal: Negative for abdominal pain, diarrhea, constipation and blood in stool.    Skin: Negative for rash or " skin infection..  Neurological: Negative for numbness, tingling, sensory changes, focal weakness or headaches.    Endo/Heme/Allergies: No bruising/bleeding easily.    Psychiatric/Behavioral: Working hard to get better and go home.     Medications:    Current Facility-Administered Medications:     apixaban (ELIQUIS) tablet 2.5 mg, 2.5 mg, Oral, BID, River Rodriguez M.D., 2.5 mg at 01/06/23 1416    sodium chloride (OCEAN) 0.65 % nasal spray 2 Spray, 2 Spray, Nasal, Q2HRS PRN, River Rordiguez M.D.    HYDROmorphone (DILAUDID) 0.2 mg/mL in 50 mL NS (PCA), , Intravenous, Continuous, River Rodriguez M.D., New Bag at 01/06/23 1141    dextrose 5 % and 0.45 % NaCl with KCl 30 mEq infusion, , Intravenous, Continuous, River Rodriguez M.D., Last Rate: 75 mL/hr at 01/05/23 1638, New Bag at 01/05/23 1638    oxymetazoline (AFRIN) 0.05 % nasal spray 2 Spray, 2 Spray, Nasal, BID PRN, River Rodriguez M.D.    Pharmacy Consult Request ...Pain Management Review 1 Each, 1 Each, Other, PHARMACY TO DOSE, River Rodriguez M.D.    acetaminophen (Tylenol) tablet 650 mg, 650 mg, Oral, Q6HRS, River Rodriguez M.D., 650 mg at 01/06/23 1714    omeprazole (PRILOSEC) capsule 40 mg, 40 mg, Oral, BID, Ramos Styles D.O., 40 mg at 01/06/23 1714    calcium carbonate (Tums) chewable tab 1,000 mg, 1,000 mg, Oral, BID, Ramos Styles D.O., 1,000 mg at 01/06/23 1714    acetaminophen (Tylenol) tablet 650 mg, 650 mg, Oral, Q4HRS PRN, Ramos Styles D.OReid, 650 mg at 01/05/23 2101    cefepime (Maxipime) 2 g in  mL IVPB, 2 g, Intravenous, Q8HRS, Nova Louis M.D., Stopped at 01/06/23 1411    ondansetron (ZOFRAN) syringe/vial injection 4 mg, 4 mg, Intravenous, Q6HRS PRN, Caren Apodaca M.D., 4 mg at 12/28/22 1825    [DISCONTINUED] insulin regular (HumuLIN R,NovoLIN R) injection, 1-6 Units, Subcutaneous, Q6HRS, 1 Units at 01/01/23 1819 **AND** [CANCELED] POC blood glucose  "manual result, , , Q6H **AND** [CANCELED] NOTIFY MD and PharmD, , , Once **AND** Administer 20 grams of glucose (approximately 8 ounces of fruit juice) every 15 minutes PRN FSBG less than 70 mg/dL, , , PRN **AND** dextrose 10 % BOLUS 25 g, 25 g, Intravenous, Q15 MIN PRN, Erum Olivares M.D., Last Rate: 999 mL/hr at 22, 25 g at 22    OBJECTIVE:     Max Temp: Temp (24hrs), Av.2 °C (99 °F), Min:36.4 °C (97.5 °F), Max:38.1 °C (100.6 °F)      Vitals: /75   Pulse (!) 113   Temp 36.7 °C (98 °F) (Temporal)   Resp 16   Ht 1.651 m (5' 5\")   Wt 68.6 kg (151 lb 3.8 oz)   SpO2 97%   BMI 25.17 kg/m²       Intake/Output Summary (Last 24 hours) at 2023 1750  Last data filed at 2023 0604  Gross per 24 hour   Intake 125.95 ml   Output 2550 ml   Net -2424.05 ml       Labs:   Latest Reference Range & Units 23 12:33 23 00:40 23 00:48   WBC 4.8 - 10.8 K/uL 1.5 (LL) 1.7 (LL) 1.9 (LL)   RBC 4.70 - 6.10 M/uL 2.82 (L) 2.72 (L) 2.83 (L)   Hemoglobin 14.0 - 18.0 g/dL 8.2 (L) 7.9 (L) 8.1 (L)   Hematocrit 42.0 - 52.0 % 24.2 (L) 23.5 (L) 24.3 (L)   Platelet Count 164 - 446 K/uL 49 (L) 42 (L) 45 (L)   MPV 9.0 - 12.9 fL 9.3 10.1 11.3   Neutrophils-Polys 44.00 - 72.00 % 82.20 (H) 80.90 (H) 83.70 (H)   Neutrophils (Absolute) 1.82 - 7.42 K/uL 1.23 (L) 1.38 (L) 1.61 (L)   Bands-Stabs 0.00 - 10.00 %   0.90   Lymphocytes 22.00 - 41.00 % 11.90 (L) 11.30 (L) 12.70 (L)   Lymphs (Absolute) 1.00 - 4.80 K/uL 0.18 (L) 0.19 (L) 0.24 (L)   Monocytes 0.00 - 13.40 % 3.40 5.20 0.00   Metamyelocytes % 0.80 1.70 2.70   Myelocytes % 1.70 0.90    Nucleated RBC /100 WBC 4.60 4.80 4.70       Physical Exam:    Constitutional: Pleasant, NAD   HENT: Normocephalic and atraumatic. Alopecia.  No rhinorrhea. Oropharynx is clear and moist.   Eyes: Conjunctivae are normal. No scleral icterus.   Neck: Supple, no adenopathy.    Cardiovascular: RRR with 2/6 murmur at LUSB  Pulmonary/Chest: Effort normal. Symmetric " "expansion.  Clear to auscultation bilaterally.  Abdomen: Soft. Bowel sounds are normal. No distension, organomegaly or mass.     Genitourinary:  Deferred  Musculoskeletal: Diffuse LE swelling (improved); more movement/strength on the R than L  Skin: Skin is warm, dry and pink.  No rash or evidence of skin infection. Port site clean and dry, accessed.     ASSESSMENT AND PLAN:     Tomas Roy \"JULY\" Estiven is a 22 y.o. 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 Emergency Department on Dec 27 with gram negative septic shock and DIC. Culture grew (within 7 hrs of drawing blood culture) E.coli and Klebsiella. Repeat blood culture ultimately no growth. Remains on Cefepime. Critically ill initially requiring intensive care but now much improved and hence transferred to floor on January 1.     WBC/ANC trending up. Patient feels overall improved but deconditioned. IVF at half maintenance .     E.coli and Klebsiella sepsis/septic shock in an immunocompromised host:  - Susceptible to cefepime, continue  - Currently off of pressors and stress dose hydrocortisone  - Repeat blood culture obtained 12/30/2022: no growth  - ID consulted for further input, they recommended removal of port when stable. However, we are hoping to maintain the CVL. Also, will F/U with ID regarding duration of therapy.      - In anticipation of eventual discharge, I have placed orders for a walker and a shower chair for use at home.     Ph+ Precursor B-Cell Acute Lymphoblastic Leukemia, in MRD Remission: Receiving therapy on [id number], AR Arm B, Delayed Intensification, Day 29 on hold  - HOLD imatinib given patient critically ill  - Due to begin 2nd half of DI on Jose 3, which will be delayed to provide time for complete recovery  - Patient's clinical status much improved and counts recovering,  no need to start G-CSF or " granulocyte infusion  - HELD Bactrim (for PJP prophylaxis) this weekend given patient severely myelosuppressed. Restart next weekend when counts trend up.     Therapy-related pancytopenia: Total white cell count trending up  - Transfused pRBCs and platelets yesterday: Hgb and platelets slightly higher (!) o/n  - Use irradiated/leukoreduced products     DIC secondary to septic shock: Resolved  - s/p FFP and cryoprecipitate   - Fibrinogen level elevated (acute phase reactant?), PTT/PT normalized  - Bloody emesis/stool resolved  - Continue omeprazole for gastritis     At risk for DVT:  - Previously tx'ed with apixaban following asparaginase but held with this episode  - Started SCDs on Jose 3 (well tolerated initially but now causing too much discomfort)  - Repeat Doppler yesterday showed no evidence of DVT  - Will restart apixaban prophylaxis, D/C SCDs     Tachycardia: (improving?)  - Likely from poor PO intake/diuresis and dehydration  - Restarted IVF fluid at half maintenance  - Monitor I/O and hemoglobin level  - Systolic murmur noted again today: character is worrisome  - Echo yesterday showed no evidence of endocarditis     Pulmonary edema/Anasarca due to fluid overload from massive transfusion/fluid bolus and capillary leak from sepsis  - Off supplemental oxygen  - Off of scheduled lasix      Leg pain: likely from deconditioning and injury to muscle due to decreased blood supply from septic shock/lactic acidosis  - Responding well to dilaudid PCA  - Encourage physical therapy, consulted PT       Discussed with nursing staff, Dr. Faye.  Total time today approx 30 minutes.    ANN MARIE Rodriguez MD  Pediatric Hematology / Oncology  [facility]  Cell. [phone]  Office. [phone]

## 2023-01-07 NOTE — PROGRESS NOTES
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  He was able to take a shower yesterday.  He has been able to ambulate in his room today.  His right leg is \"getting back to normal,\" and his left leg is \"better than it was.\"    Earlier, he requested discontinuation of his Dilaudid drip, but I was concerned about possible withdrawal.  Instead, we have weaned the continuous infusion from 0.3 mg/h to 0.2 mg/h.  He continues to use his \"demand\" dose of Dilaudid very sporadically.    Review of Systems:     Constitutional: Afebrile. Good appetite.  HENT: Negative for ear pain, nosebleeds, congestion, rhinorrhea, mouth sores.  Respiratory: Negative for shortness of breath or cough.    Gastrointestinal: Negative for nausea, vomiting, abdominal pain, diarrhea, constipation and blood in stool. (No BM today)   Skin: Negative for rash or skin infection..  Neurological: Negative for numbness, tingling, sensory changes, or headaches.    Psychiatric/Behavioral: Upbeat     Medications:    Current " Facility-Administered Medications:     sodium chloride (OCEAN) 0.65 % nasal spray 2 Spray, 2 Spray, Nasal, Q2HRS PRN, River Rodriguez M.D.    HYDROmorphone (DILAUDID) 0.2 mg/mL in 50 mL NS (PCA), , Intravenous, Continuous, River Rodriguez M.D., Rate Verify at 01/07/23 0718    dextrose 5 % and 0.45 % NaCl with KCl 30 mEq infusion, , Intravenous, Continuous, River Rodriguez M.D., Last Rate: 75 mL/hr at 01/06/23 2037, New Bag at 01/06/23 2037    oxymetazoline (AFRIN) 0.05 % nasal spray 2 Spray, 2 Spray, Nasal, BID PRN, River Rodriguez M.D.    Pharmacy Consult Request ...Pain Management Review 1 Each, 1 Each, Other, PHARMACY TO DOSE, River Rodriguez M.D.    acetaminophen (Tylenol) tablet 650 mg, 650 mg, Oral, Q6HRS, River Rodriguez M.D., 650 mg at 01/07/23 1024    omeprazole (PRILOSEC) capsule 40 mg, 40 mg, Oral, BID, Ramos Styles, D.O., 40 mg at 01/07/23 0537    calcium carbonate (Tums) chewable tab 1,000 mg, 1,000 mg, Oral, BID, Ramos Styles D.O., 1,000 mg at 01/07/23 1024    acetaminophen (Tylenol) tablet 650 mg, 650 mg, Oral, Q4HRS PRN, Ramos Styles D.O., 650 mg at 01/05/23 2101    cefepime (Maxipime) 2 g in  mL IVPB, 2 g, Intravenous, Q8HRS, Nova Louis M.D., Stopped at 01/07/23 0609    ondansetron (ZOFRAN) syringe/vial injection 4 mg, 4 mg, Intravenous, Q6HRS PRN, Caren Apodaca M.D., 4 mg at 12/28/22 1825    [DISCONTINUED] insulin regular (HumuLIN R,NovoLIN R) injection, 1-6 Units, Subcutaneous, Q6HRS, 1 Units at 01/01/23 1759 **AND** [CANCELED] POC blood glucose manual result, , , Q6H **AND** [CANCELED] NOTIFY MD and PharmD, , , Once **AND** Administer 20 grams of glucose (approximately 8 ounces of fruit juice) every 15 minutes PRN FSBG less than 70 mg/dL, , , PRN **AND** dextrose 10 % BOLUS 25 g, 25 g, Intravenous, Q15 MIN PRN, Erum Olivares M.D., Last Rate: 999 mL/hr at 12/28/22 0316, 25 g at 12/28/22  "0316    OBJECTIVE:     Max Temp: Temp (24hrs), Av.6 °C (97.9 °F), Min:36.4 °C (97.5 °F), Max:36.8 °C (98.3 °F)      Vitals: /64   Pulse 98   Temp 36.4 °C (97.5 °F) (Temporal)   Resp 16   Ht 1.651 m (5' 5\")   Wt 68.6 kg (151 lb 3.8 oz)   SpO2 96%   BMI 25.17 kg/m²       Intake/Output Summary (Last 24 hours) at 2023 1221  Last data filed at 2023 0844  Gross per 24 hour   Intake 52.25 ml   Output 2780 ml   Net -2727.75 ml         Labs:   Latest Reference Range & Units 23 00:40 23 00:48 23 00:18   WBC 4.8 - 10.8 K/uL 1.7 (LL) 1.9 (LL) 1.7 (LL)   RBC 4.70 - 6.10 M/uL 2.72 (L) 2.83 (L) 2.81 (L)   Hemoglobin 14.0 - 18.0 g/dL 7.9 (L) 8.1 (L) 8.2 (L)   Hematocrit 42.0 - 52.0 % 23.5 (L) 24.3 (L) 24.4 (L)   Platelet Count 164 - 446 K/uL 42 (L) 45 (L) 43 (L)   MPV 9.0 - 12.9 fL 10.1 11.3 10.9   Neutrophils-Polys 44.00 - 72.00 % 80.90 (H) 83.70 (H) 77.30 (H)   Neutrophils (Absolute) 1.82 - 7.42 K/uL 1.38 (L) 1.61 (L) 1.31 (L)   Bands-Stabs 0.00 - 10.00 %  0.90    Lymphocytes 22.00 - 41.00 % 11.30 (L) 12.70 (L) 11.80 (L)   Lymphs (Absolute) 1.00 - 4.80 K/uL 0.19 (L) 0.24 (L) 0.20 (L)   Monocytes 0.00 - 13.40 % 5.20 0.00 7.60   Eosinophils 0.00 - 6.90 % 0.00 0.00 0.90   Basophils 0.00 - 1.80 % 0.00 0.00 0.80   Metamyelocytes % 1.70 2.70 0.80   Myelocytes % 0.90  0.80   Nucleated RBC /100 WBC 4.80 4.70 2.40       Physical Exam:    Constitutional: Pleasant, talkative.   HENT: Normocephalic and atraumatic. Alopecia.  No rhinorrhea. Oropharynx is clear and moist.   Eyes: Conjunctivae are normal. No scleral icterus.   Neck: Supple, no adenopathy.    Cardiovascular: RRR with 2/6 murmur at LUSB  Pulmonary/Chest: Effort normal. Symmetric expansion.  Clear to auscultation bilaterally.  Abdomen: Soft. Bowel sounds are normal. No distension, organomegaly or mass.     Genitourinary:  Deferred  Musculoskeletal: Much improved mobility in RLE; weaker/limited mobility on left   Skin: Skin is warm, dry " "and pink.  No rash or evidence of skin infection. Port site clean and dry, accessed.       ASSESSMENT AND PLAN:     Tomas Roy \"JULY\" Estiven is a 22 y.o. male with [place] Chromosome Precursor B-Cell Acute Lymphoblastic Leukemia who is receiving therapy on protocol [id number], who presented to the [facility] Emergency Department on Dec 27 with gram negative septic shock and DIC. Culture grew (within 7 hrs of drawing blood culture) E.coli and Klebsiella. Repeat blood culture (Dec 30) ultimately no growth. Remains on Cefepime. Critically ill initially requiring intensive care but now much improved and hence transferred to floor on January 1.     WBC/ANC trending up very slowly. Patient feels overall improved but deconditioned. IVF at half maintenance.     E.coli and Klebsiella sepsis/septic shock in an immunocompromised host:  - Susceptible to cefepime, continue  - Currently off of pressors and stress dose hydrocortisone  - Repeat blood culture obtained 12/30/2022: no growth  - ID consulted for further input, they recommended removal of port when stable. However, we are hoping to maintain the CVL. Also, will F/U with ID regarding duration of therapy.      - In anticipation of eventual discharge, I have placed orders for a walker and a shower chair for use at home.     Ph+ Precursor B-Cell Acute Lymphoblastic Leukemia, in MRD Remission: Receiving therapy on [id number], AR Arm B, Delayed Intensification, Day 29 on hold  - Will restart imatinib, given overall improvement  - Due to begin 2nd half of DI on Jose 3, which has been delayed to provide time for complete recovery  - HELD Bactrim (for PJP prophylaxis) again this weekend given continued myelosuppression. Restart next weekend when counts trend up further.     Therapy-related pancytopenia: counts trending up slowly  - Last transfusions on January 4  - Use irradiated/leukoreduced products     DIC secondary to septic shock: Resolved  - s/p FFP " and cryoprecipitate   - Fibrinogen level elevated (acute phase reactant?), PTT/PT normalized  - Bloody emesis/stool resolved  - Continue omeprazole for gastritis     At risk for DVT:  - Previously tx'ed with apixaban following asparaginase but held with this episode  - Started SCDs on Jose 3 (well tolerated initially but now causing too much discomfort)  - Repeat Doppler yesterday showed no evidence of DVT  - Holding apixaban prophylaxis until plts > 50k  - D/C'ed SCDs yesterday due to discomfort (and with increasing mobility)     Tachycardia: (improving)  - Likely from poor PO intake/diuresis and dehydration  - Restarted IVF fluid at half maintenance  - Monitor I/O and hemoglobin level  - Systolic murmur noted again today: character is worrisome  - Echo on January 5 showed no evidence of endocarditis       Leg pain: likely from deconditioning and injury to muscle due to decreased blood supply from septic shock/lactic acidosis  - Responding well to dilaudid PCA  - Encourage mobility, consulted PT  - Previous discussion about possible transfer to inpatient rehab but it appears that this will not be necessary on current trend     Discussed with nursing staff.  Total time today approx 25 minutes.    ANN MARIE Rodriguez MD  Pediatric Hematology / Oncology  [facility]  Cell. [phone]  Office. [phone]

## 2023-01-07 NOTE — CARE PLAN
The patient is Watcher - Medium risk of patient condition declining or worsening    Shift Goals  Clinical Goals: VSS, afebrile, pain control, monitor labs  Patient Goals: pain control, rest  Family Goals: wants pt to be comfortable and to rest    Progress made toward(s) clinical / shift goals:    Problem: Knowledge Deficit - Standard  Goal: Patient and family/care givers will demonstrate understanding of plan of care, disease process/condition, diagnostic tests and medications  Outcome: Progressing  Note:   Patients pain is being managed by dilaudid drip, decreased to 0.3mg basal rate. On tele monitor SR-ST. Had shower, Noted with appetite, mother brought in food. Noted with one episode of bloody stool, Dr. Rodriguez notified no new orders ok to administer Eliquis.        Patient is not progressing towards the following goals:

## 2023-01-08 LAB
ANION GAP SERPL CALC-SCNC: 10 MMOL/L (ref 7–16)
ANISOCYTOSIS BLD QL SMEAR: ABNORMAL
BASOPHILS # BLD AUTO: 0 % (ref 0–1.8)
BASOPHILS # BLD: 0 K/UL (ref 0–0.12)
BUN SERPL-MCNC: 9 MG/DL (ref 8–22)
CALCIUM SERPL-MCNC: 8.4 MG/DL (ref 8.5–10.5)
CHLORIDE SERPL-SCNC: 99 MMOL/L (ref 96–112)
CO2 SERPL-SCNC: 23 MMOL/L (ref 20–33)
CREAT SERPL-MCNC: 0.5 MG/DL (ref 0.5–1.4)
EOSINOPHIL # BLD AUTO: 0 K/UL (ref 0–0.51)
EOSINOPHIL NFR BLD: 0 % (ref 0–6.9)
ERYTHROCYTE [DISTWIDTH] IN BLOOD BY AUTOMATED COUNT: 50.9 FL (ref 35.9–50)
GFR SERPLBLD CREATININE-BSD FMLA CKD-EPI: 149 ML/MIN/1.73 M 2
GLUCOSE SERPL-MCNC: 137 MG/DL (ref 65–99)
HCT VFR BLD AUTO: 25 % (ref 42–52)
HGB BLD-MCNC: 8.3 G/DL (ref 14–18)
LYMPHOCYTES # BLD AUTO: 0.27 K/UL (ref 1–4.8)
LYMPHOCYTES NFR BLD: 11.1 % (ref 22–41)
MANUAL DIFF BLD: NORMAL
MCH RBC QN AUTO: 28.6 PG (ref 27–33)
MCHC RBC AUTO-ENTMCNC: 33.2 G/DL (ref 33.7–35.3)
MCV RBC AUTO: 86.2 FL (ref 81.4–97.8)
METAMYELOCYTES NFR BLD MANUAL: 3.4 %
MICROCYTES BLD QL SMEAR: ABNORMAL
MONOCYTES # BLD AUTO: 0.2 K/UL (ref 0–0.85)
MONOCYTES NFR BLD AUTO: 8.5 % (ref 0–13.4)
MORPHOLOGY BLD-IMP: NORMAL
MYELOCYTES NFR BLD MANUAL: 1.7 %
NEUTROPHILS # BLD AUTO: 1.81 K/UL (ref 1.82–7.42)
NEUTROPHILS NFR BLD: 72.7 % (ref 44–72)
NEUTS BAND NFR BLD MANUAL: 2.6 % (ref 0–10)
NRBC # BLD AUTO: 0.09 K/UL
NRBC BLD-RTO: 3.7 /100 WBC
PLATELET # BLD AUTO: 63 K/UL (ref 164–446)
PLATELET BLD QL SMEAR: NORMAL
PMV BLD AUTO: 11.2 FL (ref 9–12.9)
POLYCHROMASIA BLD QL SMEAR: NORMAL
POTASSIUM SERPL-SCNC: 4.1 MMOL/L (ref 3.6–5.5)
RBC # BLD AUTO: 2.9 M/UL (ref 4.7–6.1)
RBC BLD AUTO: PRESENT
SODIUM SERPL-SCNC: 132 MMOL/L (ref 135–145)
WBC # BLD AUTO: 2.4 K/UL (ref 4.8–10.8)

## 2023-01-08 PROCEDURE — 700101 HCHG RX REV CODE 250: Performed by: PEDIATRICS

## 2023-01-08 PROCEDURE — 700111 HCHG RX REV CODE 636 W/ 250 OVERRIDE (IP): Performed by: INTERNAL MEDICINE

## 2023-01-08 PROCEDURE — 85007 BL SMEAR W/DIFF WBC COUNT: CPT

## 2023-01-08 PROCEDURE — 700102 HCHG RX REV CODE 250 W/ 637 OVERRIDE(OP): Performed by: HOSPITALIST

## 2023-01-08 PROCEDURE — A9270 NON-COVERED ITEM OR SERVICE: HCPCS | Performed by: PEDIATRICS

## 2023-01-08 PROCEDURE — A9270 NON-COVERED ITEM OR SERVICE: HCPCS | Performed by: HOSPITALIST

## 2023-01-08 PROCEDURE — 700111 HCHG RX REV CODE 636 W/ 250 OVERRIDE (IP): Performed by: PEDIATRICS

## 2023-01-08 PROCEDURE — 700105 HCHG RX REV CODE 258: Performed by: INTERNAL MEDICINE

## 2023-01-08 PROCEDURE — 99231 SBSQ HOSP IP/OBS SF/LOW 25: CPT | Performed by: PEDIATRICS

## 2023-01-08 PROCEDURE — 770004 HCHG ROOM/CARE - ONCOLOGY PRIVATE *

## 2023-01-08 PROCEDURE — 700102 HCHG RX REV CODE 250 W/ 637 OVERRIDE(OP): Performed by: PEDIATRICS

## 2023-01-08 PROCEDURE — 85025 COMPLETE CBC W/AUTO DIFF WBC: CPT

## 2023-01-08 PROCEDURE — 700105 HCHG RX REV CODE 258: Performed by: PEDIATRICS

## 2023-01-08 PROCEDURE — 80048 BASIC METABOLIC PNL TOTAL CA: CPT

## 2023-01-08 RX ADMIN — Medication: at 11:45

## 2023-01-08 RX ADMIN — APIXABAN 2.5 MG: 5 TABLET, FILM COATED ORAL at 18:25

## 2023-01-08 RX ADMIN — ACETAMINOPHEN 650 MG: 325 TABLET ORAL at 00:42

## 2023-01-08 RX ADMIN — POTASSIUM CHLORIDE: 2 INJECTION, SOLUTION, CONCENTRATE INTRAVENOUS at 06:08

## 2023-01-08 RX ADMIN — OMEPRAZOLE 40 MG: 20 CAPSULE, DELAYED RELEASE ORAL at 16:21

## 2023-01-08 RX ADMIN — CEFEPIME 2 G: 2 INJECTION, POWDER, FOR SOLUTION INTRAVENOUS at 04:45

## 2023-01-08 RX ADMIN — IMATINIB MESYLATE 400 MG: 400 TABLET, FILM COATED ORAL at 04:47

## 2023-01-08 RX ADMIN — CEFEPIME 2 G: 2 INJECTION, POWDER, FOR SOLUTION INTRAVENOUS at 21:25

## 2023-01-08 RX ADMIN — POTASSIUM CHLORIDE: 2 INJECTION, SOLUTION, CONCENTRATE INTRAVENOUS at 20:10

## 2023-01-08 RX ADMIN — ACETAMINOPHEN 650 MG: 325 TABLET ORAL at 04:46

## 2023-01-08 RX ADMIN — IMATINIB MESYLATE 200 MG: 100 TABLET, FILM COATED ORAL at 16:22

## 2023-01-08 RX ADMIN — ACETAMINOPHEN 650 MG: 325 TABLET ORAL at 12:58

## 2023-01-08 RX ADMIN — Medication: at 22:31

## 2023-01-08 RX ADMIN — CEFEPIME 2 G: 2 INJECTION, POWDER, FOR SOLUTION INTRAVENOUS at 12:59

## 2023-01-08 RX ADMIN — CALCIUM CARBONATE 1000 MG: 500 TABLET, CHEWABLE ORAL at 09:03

## 2023-01-08 RX ADMIN — OMEPRAZOLE 40 MG: 20 CAPSULE, DELAYED RELEASE ORAL at 04:46

## 2023-01-08 RX ADMIN — CALCIUM CARBONATE 1000 MG: 500 TABLET, CHEWABLE ORAL at 16:21

## 2023-01-08 ASSESSMENT — PAIN DESCRIPTION - PAIN TYPE
TYPE: ACUTE PAIN
TYPE: ACUTE PAIN

## 2023-01-08 NOTE — CARE PLAN
The patient is Watcher - Medium risk of patient condition declining or worsening    Shift Goals  Clinical Goals: safety, monitor labs, pain control, remain afebrile, port dressing change  Patient Goals: pain control, sleep  Family Goals: wants pt to be comfortable and to rest    Progress made toward(s) clinical / shift goals:    Problem: Knowledge Deficit - Standard  Goal: Patient and family/care givers will demonstrate understanding of plan of care, disease process/condition, diagnostic tests and medications  Outcome: Progressing  Patient A&Ox4. Patient and family updated on plan of care. Agreeable to plan at this time. Patient calling appropriately to make needs known.       Problem: Pain - Standard  Goal: Alleviation of pain or a reduction in pain to the patient’s comfort goal  Outcome: Progressing  Dilaudid PCA in place, patient medicated with scheduled Tylenol. Patient reports relief with intervention. Patient declines nonpharmacologic intervention at this time.      Problem: Skin Integrity  Goal: Skin integrity is maintained or improved  Outcome: Progressing  Patient turning self Q2 hours, pillows in use for support and positioning. Patient with condom cath in place for frequency and urgency, CDI.      Problem: Fall Risk  Goal: Patient will remain free from falls  Outcome: Progressing  Patient educated on fall risk, refusing use of bed alarm at this time. Patient calling appropriately for assistance at this time. Patient up x1-2 person assist. Frequent toileting offered. Belongings and call light within reach. Hourly rounding in place.      Problem: Psychosocial  Goal: Patient's level of anxiety will decrease  Outcome: Progressing     Problem: Communication  Goal: The ability to communicate needs accurately and effectively will improve  Outcome: Progressing     Problem: Mobility  Goal: Patient's capacity to carry out activities will improve  Outcome: Progressing       Patient is not progressing towards the  following goals:

## 2023-01-08 NOTE — CARE PLAN
Problem: Pain - Standard  Goal: Alleviation of pain or a reduction in pain to the patient’s comfort goal  Outcome: Progressing     Problem: Fall Risk  Goal: Patient will remain free from falls  Outcome: Progressing     The patient is Watcher - Medium risk of patient condition declining or worsening    Shift Goals  Clinical Goals: safety, monitor labs, pain control, remain afebrile, port dressing change  Patient Goals: pain control, sleep  Family Goals: wants pt to be comfortable and to rest    Progress made toward(s) clinical / shift goals:  Fall precautions are in place.    Patient is not progressing towards the following goals:

## 2023-01-08 NOTE — PROGRESS NOTES
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  Pain in legs comes and goes, especially with any exertion.  Dilaudid drip at 0.2 mg/hr seems adequate and he uses his \"demand\" dose sparingly.    Review of Systems:     Constitutional: Afebrile. Good appetite.  HENT: Negative for auditory changes, ear pain, nosebleeds, congestion, rhinorrhea, sore throat, mouth sores.  Eyes: Vision is \"pretty much normal now\"  Respiratory: Negative for shortness of breath or cough.   Cardiovascular: Negative for chest pain; tachycardic especially with any exertion  Gastrointestinal: Negative for nausea, vomiting, abdominal pain, diarrhea, constipation and blood in stool; BM x 1 this morning.     Skin: Negative for rash or skin infection..  Neurological: Negative for numbness, tingling, or headaches.    Endo/Heme/Allergies: No bruising/bleeding easily.    Psychiatric/Behavioral: Eager to get better and go home     Medications:    Current Facility-Administered Medications:     HYDROmorphone (DILAUDID) 0.2 mg/mL in 50 mL NS " (PCA), , Intravenous, Continuous, River Rodriguez M.D., Rate Verify at 01/08/23 0652    imatinib (Gleevec) tablet 200 mg, 200 mg, Oral, Q EVENING, River Rodriguez M.D., 200 mg at 01/07/23 2041    imatinib (Gleevec) tablet 400 mg, 400 mg, Oral, QAM, River Rodriguez M.D., 400 mg at 01/08/23 0447    sodium chloride (OCEAN) 0.65 % nasal spray 2 Spray, 2 Spray, Nasal, Q2HRS PRN, River Rodriguez M.D.    dextrose 5 % and 0.45 % NaCl with KCl 30 mEq infusion, , Intravenous, Continuous, River Rodriguez M.D., Last Rate: 75 mL/hr at 01/08/23 0608, New Bag at 01/08/23 0608    Pharmacy Consult Request ...Pain Management Review 1 Each, 1 Each, Other, PHARMACY TO DOSE, River Rodriguez M.D.    acetaminophen (Tylenol) tablet 650 mg, 650 mg, Oral, Q6HRS, River Rodriguez M.D., 650 mg at 01/08/23 0446    omeprazole (PRILOSEC) capsule 40 mg, 40 mg, Oral, BID, Ramos Styles D.O., 40 mg at 01/08/23 0446    calcium carbonate (Tums) chewable tab 1,000 mg, 1,000 mg, Oral, BID, Ramos Styles D.O., 1,000 mg at 01/08/23 0903    acetaminophen (Tylenol) tablet 650 mg, 650 mg, Oral, Q4HRS PRN, Ramos Styles D.O., 650 mg at 01/05/23 2101    cefepime (Maxipime) 2 g in  mL IVPB, 2 g, Intravenous, Q8HRS, Nova Louis M.D., Stopped at 01/08/23 0515    ondansetron (ZOFRAN) syringe/vial injection 4 mg, 4 mg, Intravenous, Q6HRS PRN, Caren Apodaca M.D., 4 mg at 12/28/22 1825    [DISCONTINUED] insulin regular (HumuLIN R,NovoLIN R) injection, 1-6 Units, Subcutaneous, Q6HRS, 1 Units at 01/01/23 1759 **AND** [CANCELED] POC blood glucose manual result, , , Q6H **AND** [CANCELED] NOTIFY MD and PharmD, , , Once **AND** Administer 20 grams of glucose (approximately 8 ounces of fruit juice) every 15 minutes PRN FSBG less than 70 mg/dL, , , PRN **AND** dextrose 10 % BOLUS 25 g, 25 g, Intravenous, Q15 MIN PRN, Erum Olivares M.D., Last Rate: 999 mL/hr at 12/28/22 0316,  "25 g at 22 0316    OBJECTIVE:     Max Temp: Temp (24hrs), Av.4 °C (97.5 °F), Min:36.2 °C (97.1 °F), Max:36.7 °C (98.1 °F)      Vitals: /75   Pulse (!) 103   Temp 36.4 °C (97.5 °F) (Oral)   Resp 18   Ht 1.651 m (5' 5\")   Wt 68.6 kg (151 lb 3.8 oz)   SpO2 98%   BMI 25.17 kg/m²       Intake/Output Summary (Last 24 hours) at 2023 1120  Last data filed at 2023 0608  Gross per 24 hour   Intake 43.45 ml   Output 1880 ml   Net -1836.55 ml       Labs:   Latest Reference Range & Units 23 00:18 23 00:48   WBC 4.8 - 10.8 K/uL 1.7 (LL) 2.4 (L)   RBC 4.70 - 6.10 M/uL 2.81 (L) 2.90 (L)   Hemoglobin 14.0 - 18.0 g/dL 8.2 (L) 8.3 (L)   Hematocrit 42.0 - 52.0 % 24.4 (L) 25.0 (L)   Platelet Count 164 - 446 K/uL 43 (L) 63 (L)   MPV 9.0 - 12.9 fL 10.9 11.2   Neutrophils-Polys 44.00 - 72.00 % 77.30 (H) 72.70 (H)   Neutrophils (Absolute) 1.82 - 7.42 K/uL 1.31 (L) 1.81 (L)   Bands-Stabs 0.00 - 10.00 %  2.60   Lymphocytes 22.00 - 41.00 % 11.80 (L) 11.10 (L)   Lymphs (Absolute) 1.00 - 4.80 K/uL 0.20 (L) 0.27 (L)   Monocytes 0.00 - 13.40 % 7.60 8.50   Eosinophils 0.00 - 6.90 % 0.90 0.00   Basophils 0.00 - 1.80 % 0.80 0.00   Metamyelocytes % 0.80 3.40   Myelocytes % 0.80 1.70   Nucleated RBC /100 WBC 2.40 3.70      Latest Reference Range & Units 23 00:48   Sodium 135 - 145 mmol/L 132 (L)   Potassium 3.6 - 5.5 mmol/L 4.1   Chloride 96 - 112 mmol/L 99   Co2 20 - 33 mmol/L 23   Anion Gap 7.0 - 16.0  10.0   Glucose 65 - 99 mg/dL 137 (H)   Bun 8 - 22 mg/dL 9   Creatinine 0.50 - 1.40 mg/dL 0.50   GFR (CKD-EPI) >60 mL/min/1.73 m 2 149   Calcium 8.5 - 10.5 mg/dL 8.4 (L)       Physical Exam:    Constitutional: Pleasant, somewhat fatigued.   HENT: Normocephalic and atraumatic. Alopecia.  No rhinorrhea. Oropharynx is clear and moist.   Eyes: Conjunctivae are normal. No scleral icterus.   Neck: Supple, no adenopathy.    Cardiovascular: RRR with 2/6 murmur at LUSB  Pulmonary/Chest: Effort normal. Symmetric " "expansion.  Clear to auscultation bilaterally.  Abdomen: Soft. Bowel sounds are normal. No distension, organomegaly or mass.     Musculoskeletal: Increasing movement in LLE.  Neurological: Alert and oriented to person and place. Exhibits normal muscle tone bilaterally in upper and lower extremities.Coordination grossly normal.    Skin: Skin is warm, dry and pink.  No rash or evidence of skin infection. Port site clean and dry, accessed.     ASSESSMENT AND PLAN:     Tomas Roy \"JULY\" Estiven is a 22 y.o. male with [place] Chromosome Precursor B-Cell Acute Lymphoblastic Leukemia who is receiving therapy on protocol [id number], who presented to the [facility] Emergency Department on Dec 27 with gram negative septic shock and DIC. Culture grew (within 7 hrs of drawing blood culture) E.coli and Klebsiella. Repeat blood culture (Dec 30) ultimately no growth. Remains on Cefepime. Critically ill initially requiring intensive care but now much improved and hence transferred to floor on January 1.     Counts all trending up slowly. Patient feels overall improved but deconditioned. IVF at half maintenance.     E.coli and Klebsiella sepsis/septic shock in an immunocompromised host:  - Susceptible to cefepime, continue  - Currently off of pressors and stress dose hydrocortisone  - Repeat blood culture obtained 12/30/2022: no growth (this is day 9 since negative culture)  - ID consulted for further input, they recommended removal of port when stable. However, we are hoping to maintain the CVL. Also, will F/U with ID regarding duration of therapy.     - Continue omeprazole for gastritis     Ph+ Precursor B-Cell Acute Lymphoblastic Leukemia, in MRD Remission: Receiving therapy on [id number], AR Arm B, Delayed Intensification, Day 29 on hold  - Will restart imatinib, given overall improvement  - Due to begin 2nd half of DI on Jose 3, which has been delayed to provide time for complete recovery  - HELD Bactrim " (for PJP prophylaxis) again this weekend given continued myelosuppression. Restart next weekend when counts trend up further.     Therapy-related pancytopenia: counts trending up  - Last transfusions on January 4  - Use irradiated/leukoreduced products     At risk for DVT:  - Previously tx'ed with apixaban following asparaginase but held with this episode  - Started SCDs on Jose 3 (well tolerated initially but now causing too much discomfort)  - Repeat Doppler yesterday showed no evidence of DVT  - Restarting apixaban prophylaxis with plts > 50k  - D/C'ed SCDs yesterday due to discomfort (and with increasing mobility)     Tachycardia: (improving)  - Likely from poor PO intake/diuresis and dehydration  - Restarted IVF fluid at half maintenance  - Monitor I/O and hemoglobin level  - Systolic murmur noted again today  - Echo on January 5 showed no evidence of endocarditis       Leg pain: likely from deconditioning and injury to muscle due to decreased blood supply from septic shock/lactic acidosis  - Responding well to dilaudid PCA, weaning now; consider transition to oral narcotics soon  - Encourage mobility, consulted PT  - Previous discussion about possible transfer to inpatient rehab but it appears that this will not be necessary on current trend  - In anticipation of eventual discharge, I have placed orders for a walker and a shower chair for use at home.    Discussed with nursing staff.  Total time today approx 25 minutes.    ANN MARIE Rodriguez MD  Pediatric Hematology / Oncology  [facility]  Cell. [phone]  Office. [phone]

## 2023-01-08 NOTE — CARE PLAN
Problem: Pain - Standard  Goal: Alleviation of pain or a reduction in pain to the patient’s comfort goal  Outcome: Progressing     Problem: Fall Risk  Goal: Patient will remain free from falls  Outcome: Progressing     The patient is Watcher - Medium risk of patient condition declining or worsening    Shift Goals  Clinical Goals: safety, remain afebrile, pain control, monitor labs and vitals  Patient Goals: pain control, rest  Family Goals: wants pt to be comfortable and to rest    Progress made toward(s) clinical / shift goals:  Pt medicated per MAR    Patient is not progressing towards the following goals:

## 2023-01-09 DIAGNOSIS — C91.00 PHILADELPHIA CHROMOSOME POSITIVE ACUTE LYMPHOBLASTIC LEUKEMIA (HCC): ICD-10-CM

## 2023-01-09 LAB
BASOPHILS # BLD AUTO: 0.4 % (ref 0–1.8)
BASOPHILS # BLD: 0.01 K/UL (ref 0–0.12)
EOSINOPHIL # BLD AUTO: 0 K/UL (ref 0–0.51)
EOSINOPHIL NFR BLD: 0 % (ref 0–6.9)
ERYTHROCYTE [DISTWIDTH] IN BLOOD BY AUTOMATED COUNT: 51.2 FL (ref 35.9–50)
HCT VFR BLD AUTO: 24.9 % (ref 42–52)
HGB BLD-MCNC: 8.2 G/DL (ref 14–18)
IMM GRANULOCYTES # BLD AUTO: 0.12 K/UL (ref 0–0.11)
IMM GRANULOCYTES NFR BLD AUTO: 4.8 % (ref 0–0.9)
LYMPHOCYTES # BLD AUTO: 0.37 K/UL (ref 1–4.8)
LYMPHOCYTES NFR BLD: 14.7 % (ref 22–41)
MCH RBC QN AUTO: 29 PG (ref 27–33)
MCHC RBC AUTO-ENTMCNC: 32.9 G/DL (ref 33.7–35.3)
MCV RBC AUTO: 88 FL (ref 81.4–97.8)
MONOCYTES # BLD AUTO: 0.24 K/UL (ref 0–0.85)
MONOCYTES NFR BLD AUTO: 9.5 % (ref 0–13.4)
NEUTROPHILS # BLD AUTO: 1.78 K/UL (ref 1.82–7.42)
NEUTROPHILS NFR BLD: 70.6 % (ref 44–72)
NRBC # BLD AUTO: 0.09 K/UL
NRBC BLD-RTO: 3.6 /100 WBC
PLATELET # BLD AUTO: 76 K/UL (ref 164–446)
PMV BLD AUTO: 10.3 FL (ref 9–12.9)
RBC # BLD AUTO: 2.83 M/UL (ref 4.7–6.1)
WBC # BLD AUTO: 2.5 K/UL (ref 4.8–10.8)

## 2023-01-09 PROCEDURE — 99232 SBSQ HOSP IP/OBS MODERATE 35: CPT | Performed by: PEDIATRICS

## 2023-01-09 PROCEDURE — A9270 NON-COVERED ITEM OR SERVICE: HCPCS | Performed by: HOSPITALIST

## 2023-01-09 PROCEDURE — 85025 COMPLETE CBC W/AUTO DIFF WBC: CPT

## 2023-01-09 PROCEDURE — 700111 HCHG RX REV CODE 636 W/ 250 OVERRIDE (IP): Performed by: PEDIATRICS

## 2023-01-09 PROCEDURE — 770004 HCHG ROOM/CARE - ONCOLOGY PRIVATE *

## 2023-01-09 PROCEDURE — 700111 HCHG RX REV CODE 636 W/ 250 OVERRIDE (IP): Performed by: INTERNAL MEDICINE

## 2023-01-09 PROCEDURE — RXMED WILLOW AMBULATORY MEDICATION CHARGE: Performed by: PEDIATRICS

## 2023-01-09 PROCEDURE — A9270 NON-COVERED ITEM OR SERVICE: HCPCS | Performed by: PEDIATRICS

## 2023-01-09 PROCEDURE — 700102 HCHG RX REV CODE 250 W/ 637 OVERRIDE(OP): Performed by: HOSPITALIST

## 2023-01-09 PROCEDURE — 700105 HCHG RX REV CODE 258: Performed by: INTERNAL MEDICINE

## 2023-01-09 PROCEDURE — 700101 HCHG RX REV CODE 250: Performed by: PEDIATRICS

## 2023-01-09 PROCEDURE — 700102 HCHG RX REV CODE 250 W/ 637 OVERRIDE(OP): Performed by: PEDIATRICS

## 2023-01-09 RX ORDER — HYDROCODONE BITARTRATE AND ACETAMINOPHEN 5; 325 MG/1; MG/1
1 TABLET ORAL EVERY 6 HOURS PRN
Status: DISCONTINUED | OUTPATIENT
Start: 2023-01-09 | End: 2023-01-10

## 2023-01-09 RX ORDER — SULFAMETHOXAZOLE AND TRIMETHOPRIM 400; 80 MG/1; MG/1
2 TABLET ORAL 2 TIMES DAILY
Qty: 20 TABLET | Refills: 11 | Status: SHIPPED | OUTPATIENT
Start: 2023-01-09 | End: 2023-01-14

## 2023-01-09 RX ORDER — HYDROMORPHONE HYDROCHLORIDE 1 MG/ML
0.2 INJECTION, SOLUTION INTRAMUSCULAR; INTRAVENOUS; SUBCUTANEOUS EVERY 4 HOURS PRN
Status: DISCONTINUED | OUTPATIENT
Start: 2023-01-09 | End: 2023-01-10

## 2023-01-09 RX ADMIN — CEFEPIME 2 G: 2 INJECTION, POWDER, FOR SOLUTION INTRAVENOUS at 21:18

## 2023-01-09 RX ADMIN — CEFEPIME 2 G: 2 INJECTION, POWDER, FOR SOLUTION INTRAVENOUS at 05:13

## 2023-01-09 RX ADMIN — HYDROMORPHONE HYDROCHLORIDE 0.2 MG: 1 INJECTION, SOLUTION INTRAMUSCULAR; INTRAVENOUS; SUBCUTANEOUS at 20:35

## 2023-01-09 RX ADMIN — IMATINIB MESYLATE 200 MG: 100 TABLET, FILM COATED ORAL at 17:42

## 2023-01-09 RX ADMIN — OMEPRAZOLE 40 MG: 20 CAPSULE, DELAYED RELEASE ORAL at 17:41

## 2023-01-09 RX ADMIN — CALCIUM CARBONATE 1000 MG: 500 TABLET, CHEWABLE ORAL at 17:41

## 2023-01-09 RX ADMIN — POTASSIUM CHLORIDE: 2 INJECTION, SOLUTION, CONCENTRATE INTRAVENOUS at 08:58

## 2023-01-09 RX ADMIN — POTASSIUM CHLORIDE: 2 INJECTION, SOLUTION, CONCENTRATE INTRAVENOUS at 20:37

## 2023-01-09 RX ADMIN — CEFEPIME 2 G: 2 INJECTION, POWDER, FOR SOLUTION INTRAVENOUS at 14:52

## 2023-01-09 RX ADMIN — OMEPRAZOLE 40 MG: 20 CAPSULE, DELAYED RELEASE ORAL at 05:13

## 2023-01-09 RX ADMIN — APIXABAN 2.5 MG: 5 TABLET, FILM COATED ORAL at 05:13

## 2023-01-09 RX ADMIN — CALCIUM CARBONATE 1000 MG: 500 TABLET, CHEWABLE ORAL at 09:00

## 2023-01-09 RX ADMIN — APIXABAN 2.5 MG: 5 TABLET, FILM COATED ORAL at 17:42

## 2023-01-09 RX ADMIN — IMATINIB MESYLATE 400 MG: 400 TABLET, FILM COATED ORAL at 05:13

## 2023-01-09 ASSESSMENT — PAIN DESCRIPTION - PAIN TYPE
TYPE: ACUTE PAIN

## 2023-01-09 NOTE — CARE PLAN
The patient is Watcher - Medium risk of patient condition declining or worsening    Shift Goals  Clinical Goals: pain control, safety, abx, remain afebrile, monitor labs  Patient Goals: rest  Family Goals: wants pt to be comfortable and to rest    Progress made toward(s) clinical / shift goals:    Problem: Knowledge Deficit - Standard  Goal: Patient and family/care givers will demonstrate understanding of plan of care, disease process/condition, diagnostic tests and medications  Outcome: Progressing  Patient A&Ox4. Patient updated on plan of care agreeable to plan at this time. Patient calling appropriately for assistance and to make needs known.      Problem: Pain - Standard  Goal: Alleviation of pain or a reduction in pain to the patient’s comfort goal  Outcome: Progressing  Patient with dilaudid PCA in place. Patient reports relief with intervention.      Problem: Skin Integrity  Goal: Skin integrity is maintained or improved  Outcome: Progressing  Patient turning and repositioning self Q2. Pillows in use for positioning and support.      Problem: Fall Risk  Goal: Patient will remain free from falls  Outcome: Progressing  Patient educated on fall risk. Patient refusing bed alarm, calling and fall precautions in place.      Problem: Hemodynamics  Goal: Patient's hemodynamics, fluid balance and neurologic status will be stable or improve  Outcome: Progressing     Problem: Mobility  Goal: Patient's capacity to carry out activities will improve  Outcome: Progressing  Patient up x1-2 assist      Problem: Infection - Standard  Goal: Patient will remain free from infection  Outcome: Progressing  Patient afebrile      Problem: Communication  Goal: The ability to communicate needs accurately and effectively will improve  Outcome: Met     Problem: Respiratory  Goal: Patient will achieve/maintain optimum respiratory ventilation and gas exchange  Outcome: Met       Patient is not progressing towards the following  goals:

## 2023-01-09 NOTE — PROGRESS NOTES
Pediatric Hematology/Oncology  Daily Progress Note      Patient Name:  Tomas Jean-Baptiste  : 2000  MRN: [id number]    Location of Service:  [facility]  Date of Service: 2023  Time: 8:00 AM    Hospital Day: 14    Protocol / Treatment Plan: [place] Chromosome Precursor B-Cell Acute Lymphoblastic Leukemia, ON STUDY [id number], Delayed Intensification, Day 35 (Day 29 DELAYED/HELD)    SUBJECTIVE:     No acute events overnight.  Afebrile with Tmax 98.8F.  Today, energy is much improved and JULY is feeling well.  He denies any headaches or neurological changes.  He reports improved vision.  No complaints of any nosebleeds.  No complaints of any mouth sores.  JULY reports an occasional blood clot that he will cough up.  No complaints of any nausea, vomiting, diarhea or constipation.  Reports occasional blood clot (not bright red) in stool.  No abdominal pain.  JULY does report that his legs are much improved.  He still has weakness and some pain but reports that he was up and walking to the bathroom yesterday with assistance.  No complaints of any rashes or skin changes.  Very infrequent PCA boluses.    Review of Systems:     Constitutional: Afebrile, feeling much better than yesterday.  I  HENT: Negative for auditory changes or ear pain, no nosebleeds, mild congestion and rhinorrhea, no sore throat.  No mouth sores.  Eyes: Negative for visual changes.  Respiratory: Negative for shortness of breath.  Cardiovascular: Negative.  Gastrointestinal: Negative for nausea, vomiting, abdominal pain, diarrhea, constipation.  Genitourinary: Negative  Musculoskeletal: Improved leg pain.  Skin: Negative for rash or skin infection.  Neurological: Negative for numbness, tingling, sensory changes, weakness or headaches.    Endo/Heme/Allergies: No bruising/bleeding easily.    Psychiatric/Behavioral: Good mood.     OBJECTIVE:     Max Temp: Temp (24hrs), Av.8 °C (98.3 °F), Min:36.4 °C (97.5 °F), Max:37.1 °C  "(98.8 °F)    Vitals: /47   Pulse (!) 104   Temp 36.6 °C (97.9 °F) (Temporal)   Resp 18   Ht 1.651 m (5' 5\")   Wt 68.6 kg (151 lb 3.8 oz)   SpO2 95%   BMI 25.17 kg/m²     I/O:   Intake/Output Summary (Last 24 hours) at 1/9/2023 0800  Last data filed at 1/9/2023 0600  Gross per 24 hour   Intake 30.05 ml   Output 2000 ml   Net -1969.95 ml       Labs:    Most Recent Hematology Labs:    Lab Results   Component Value Date/Time    WBC 2.4 (L) 01/08/2023 0048    HEMOGLOBIN 8.3 (L) 01/08/2023 0048    MCV 86.2 01/08/2023 0048    PLATELETCT 63 (L) 01/08/2023 0048    NEUTS 1.81 (L) 01/08/2023 0048       Most Recent Metabolic Panel:    Lab Results   Component Value Date/Time    POTASSIUM 4.1 01/08/2023 0048    CHLORIDE 99 01/08/2023 0048    CO2 23 01/08/2023 0048    GLUCOSE 137 (H) 01/08/2023 0048    BUN 9 01/08/2023 0048    CREATININE 0.50 01/08/2023 0048    CALCIUM 8.4 (L) 01/08/2023 0048    ANION 10.0 01/08/2023 0048     Physical Exam:    Constitutional: Much improved, well appearing.   HENT: Normocephalic and atraumatic. Alopecia.  No rhinorrhea. Oropharynx is clear.  Lips are dry.  Eyes: Conjunctivae are normal. EOMI.  Non icteric.  Neck: Normal range of motion of neck, no adenopathy.    Cardiovascular: Normal rate, regular rhythm.  No murmur. DP/radial pulses 2+, cap refill < 2 sec.  Pulmonary/Chest: Effort normal. No respiratory distress. Symmetric expansion.  No crackles or wheezes.  Abdomen: Soft. Bowel sounds are normal. No distension and no mass. There is no hepatosplenomegaly.    Genitourinary:  Deferred  Musculoskeletal: Normal range of motion of lower and upper extremities bilaterally.   Neurological: Alert and oriented to person and place. Exhibits normal muscle tone bilaterally in upper and lower extremities. Gait not assessed.  Skin: Skin is warm, dry and pink.  No rash or evidence of skin infection.    Psychiatric: Mood improved greatly from yesterday.    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 coli and Klebsiella   - Both organisms susceptible to cefepime  - First repeat blood culture obtained 12/30/2022 negative  - S/P pressor support and stress dose hydrocortisone  - Day 10 of cefepime from first (obtained) negative culture    - Did not remove port-a-cath    - Will discuss with ID total duration of abx, but anticipate 14 days course    2) Ph+ Precursor B-Cell Acute Lymphoblastic Leukemia, in MRD Remission:              - 2-3 weeks of symptoms              - Presenting WBC > 440 k/uL, hyperleukocytosis              - Start of Hydroxyurea (1500 mg PO Q8) 2320 on 5/27/2022  - discontinued after only 55 hours              - No steroid pretreatment              - 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              - Testicular status NEGATIVE                   - Flow cytometry of both 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, FISH positive for BCR-ABL1 translocation              - Enrolled on [facility] [id number]              - Initially enrolled on [facility] [id number] - but 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                            - Enrolled on [facility] [id number] and began study 6/13/2022              - Started imatinib therapy 6/3/2022 (split dosing of imatinib 400 mg PO QAM and 200 mg PO QPM) - continued at Day 15 of Induction 1A with imatinib 600 mg PO daily               - No changes needed in imatinib dosing to through first half of Delayed Intensification                   - End of Induction IA and IB MRD  negative                 - WBC 2.5, Hgb 8.2, platelets 76  - ANC 1780,   - Creatinine 0.50                - Imatinib held for Septic Shock 12/27/2022-1/8/2023     - Imatinib 600 mg PO daily restarted 1/8/2023    [id number], AR Arm B, Delayed Intensification, Day 35 (Day 29 therapy being HELD due to Septic Shock):                  2) Chemotherapy and Sepsis Related Pancytopenia:        - WBC 2.5, Hgb 8.2, platelets 76  - ANC 1780,   - No transfusion indicated  - Transfuse for hemoglobin less than 7  - Transfuse for platelets less than 10                 3) Sixth Cranial Nerve Palsy (IMPROVED/RESOLVED):             - Followed by Dr. Carranza     4) At Risk of Opportunistic Lung Infection:             - Bactrim DS PO BID Sat and Sun to resume this weekend     5) Leg Pain:    - Overall greatly improved   - Able to ambulate   - PCA discontinued today   - Dilaudid 0.2 mg IV Q6 PRN moderate pain   - Hydrocodone 5 mg/325 mg PO Q4PRN mild pain  6) Social:             - Continue with support             - Continue school as tolerated     7) Access:               - R Port-A-Cath in place      8) Transaminitis (IMPROVED):             - Most recent labs with AST35, ALT 73    9) JACOBY:             - Resolved, Cr. 0.5     10) At Risk for Deep Venous Thrombosis:             - Restarted apixaban 2.5 mg PO BID given not consistenly ambulatory     11) Research Participant:           [employer] [facility] - Source Data         Diagnosis: Ph+ Precursor B-Cell Acute Lymphoblastic Leukemia     Disease Status: EOI1A MRD NEGATIVE, EOI1B RD NEGATIVE, CNS3c, testicular negative, HSV1 IgG POSITIVE, CMV IgG NEGATIVE, VARICELLA IgG POSITIVE     Active Studies: DIOL15N1, [id number]                                                                                                      Inactive Studies: [id number]                                                                                                                                                 Optional Studies: None             Protocol: International Phase 3 trial in [place] 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      Treatment Plan: [id number](OS), AR-Arm B, Delayed Intensification, Day 35     Height: 1.650 m      Weight: 75.5 kg       BSA: 1.86 m²   (Start of Delayed Intensification 12/6/2022)                                                                                                                                           Performance Status: Karnofsky 60, ECOG  3 (1/9/2023)    Treatment Plan Medications:       Currently taking imatinib 600 mg PO daily (Started 1/8/2023)      Evaluations / Study Labs:  (1/9/2023)    None required     Therapy Given: (1/9/2023)     None         Disposition: Discharge planning underway.  Inpatient for additional recovery.      Pepe Faye MD  Pediatric Hematology / Oncology  [facility]  Cell.  [phone]  Office. [phone]

## 2023-01-09 NOTE — DISCHARGE PLANNING
Received Choice form at [phone]  Agency/Facility Name: [facility].   Referral sent per Choice form @ [phone]

## 2023-01-09 NOTE — DISCHARGE PLANNING
Case Management Discharge Planning    Admission Date: 12/27/2022  GMLOS: 5  ALOS: 13    6-Clicks ADL Score: 15  6-Clicks Mobility Score: 11  PT and/or OT Eval ordered: Yes  Post-acute Referrals Ordered: No  Post-acute Choice Obtained: No  Has referral(s) been sent to post-acute provider:  No      Anticipated Discharge Dispo: Discharge Disposition: Discharged to home/self care (01)    DME Needed: Yes    DME Ordered: Yes    Action(s) Taken: Updated Provider/Nurse on Discharge Plan    Voalte message to Dr. Rodriguez. Dc plan is TBD by Dr. Faye. LSW met with pt at bedside, reviewed the demographic information on the face sheet and completed a assessment. LSW obtained choice for DME, [facility].     Escalations Completed: None    Medically Clear: No    Next Steps: LSW will follow and assist the team with any additional dc needs.     Barriers to Discharge: Medical clearance    Is the patient up for discharge tomorrow: No    Care Transition Team Assessment    Information Source  Orientation Level: Oriented X4  Information Given By: Patient  Who is responsible for making decisions for patient? : Patient    Readmission Evaluation  Is this a readmission?: No    Elopement Risk  Legal Hold: No  Ambulatory or Self Mobile in Wheelchair: Yes  Disoriented: No  Psychiatric Symptoms: None  History of Wandering: No  Elopement this Admit: No  Vocalizing Wanting to Leave: No  Displays Behaviors, Body Language Wanting to Leave: No-Not at Risk for Elopement  Elopement Risk: Not at Risk for Elopement    Interdisciplinary Discharge Planning  Lives with - Patient's Self Care Capacity:  (Mother)  Housing / Facility: [address] [facility]  Prior Services: Intermittent Physical Support for ADL Per Family    Discharge Preparedness  What is your plan after discharge?: Home with help  What are your discharge supports?: Parent, Other (comment) (Aunt)  Prior Functional Level: Independent with Activities of Daily Living, Independent with Medication  Management  Difficulity with ADLs: None  Difficulity with IADLs: None    Functional Assesment  Prior Functional Level: Independent with Activities of Daily Living, Independent with Medication Management    Finances  Financial Barriers to Discharge: No  Prescription Coverage: Yes    Vision / Hearing Impairment  Right Eye Vision: Impaired, Wears Glasses  Left Eye Vision: Impaired, Wears Glasses         Advance Directive  Advance Directive?: None  Advance Directive offered?: AD Booklet given    Domestic Abuse  Have you ever been the victim of abuse or violence?: No    Psychological Assessment  History of Substance Abuse: None  History of Psychiatric Problems: No  Non-compliant with Treatment: No  Newly Diagnosed Illness: Yes    Discharge Risks or Barriers  Discharge risks or barriers?: No    Anticipated Discharge Information  Discharge Disposition: Discharged to home/self care (01)  Discharge Address: [address] [place] [id number]  Discharge Contact Phone Number: [phone]

## 2023-01-10 ENCOUNTER — PATIENT OUTREACH (OUTPATIENT)
Dept: PEDIATRIC HEMATOLOGY/ONCOLOGY | Facility: OUTPATIENT CENTER | Age: 23
End: 2023-01-10
Payer: COMMERCIAL

## 2023-01-10 PROCEDURE — 700102 HCHG RX REV CODE 250 W/ 637 OVERRIDE(OP): Performed by: PEDIATRICS

## 2023-01-10 PROCEDURE — A9270 NON-COVERED ITEM OR SERVICE: HCPCS | Performed by: PEDIATRICS

## 2023-01-10 PROCEDURE — 99232 SBSQ HOSP IP/OBS MODERATE 35: CPT | Performed by: PEDIATRICS

## 2023-01-10 PROCEDURE — 700105 HCHG RX REV CODE 258: Performed by: INTERNAL MEDICINE

## 2023-01-10 PROCEDURE — 700111 HCHG RX REV CODE 636 W/ 250 OVERRIDE (IP): Performed by: INTERNAL MEDICINE

## 2023-01-10 PROCEDURE — 770004 HCHG ROOM/CARE - ONCOLOGY PRIVATE *

## 2023-01-10 PROCEDURE — 700102 HCHG RX REV CODE 250 W/ 637 OVERRIDE(OP): Performed by: HOSPITALIST

## 2023-01-10 PROCEDURE — 97535 SELF CARE MNGMENT TRAINING: CPT

## 2023-01-10 PROCEDURE — 97530 THERAPEUTIC ACTIVITIES: CPT

## 2023-01-10 PROCEDURE — 700111 HCHG RX REV CODE 636 W/ 250 OVERRIDE (IP): Performed by: PEDIATRICS

## 2023-01-10 PROCEDURE — 700105 HCHG RX REV CODE 258: Performed by: PEDIATRICS

## 2023-01-10 PROCEDURE — 700101 HCHG RX REV CODE 250: Performed by: PEDIATRICS

## 2023-01-10 PROCEDURE — A9270 NON-COVERED ITEM OR SERVICE: HCPCS | Performed by: HOSPITALIST

## 2023-01-10 RX ORDER — DEXTROSE MONOHYDRATE, SODIUM CHLORIDE, AND POTASSIUM CHLORIDE 50; 1.49; 4.5 G/1000ML; G/1000ML; G/1000ML
INJECTION, SOLUTION INTRAVENOUS CONTINUOUS
Status: DISCONTINUED | OUTPATIENT
Start: 2023-01-10 | End: 2023-01-14 | Stop reason: HOSPADM

## 2023-01-10 RX ORDER — HYDROCODONE BITARTRATE AND ACETAMINOPHEN 5; 325 MG/1; MG/1
1 TABLET ORAL EVERY 6 HOURS PRN
Status: DISCONTINUED | OUTPATIENT
Start: 2023-01-10 | End: 2023-01-14 | Stop reason: HOSPADM

## 2023-01-10 RX ADMIN — HYDROCODONE BITARTRATE AND ACETAMINOPHEN 1 TABLET: 5; 325 TABLET ORAL at 16:32

## 2023-01-10 RX ADMIN — HYDROMORPHONE HYDROCHLORIDE 0.2 MG: 1 INJECTION, SOLUTION INTRAMUSCULAR; INTRAVENOUS; SUBCUTANEOUS at 05:31

## 2023-01-10 RX ADMIN — HYDROCODONE BITARTRATE AND ACETAMINOPHEN 1 TABLET: 5; 325 TABLET ORAL at 22:32

## 2023-01-10 RX ADMIN — IMATINIB MESYLATE 400 MG: 400 TABLET, FILM COATED ORAL at 05:35

## 2023-01-10 RX ADMIN — OMEPRAZOLE 40 MG: 20 CAPSULE, DELAYED RELEASE ORAL at 05:34

## 2023-01-10 RX ADMIN — CEFEPIME 2 G: 2 INJECTION, POWDER, FOR SOLUTION INTRAVENOUS at 05:33

## 2023-01-10 RX ADMIN — POTASSIUM CHLORIDE, DEXTROSE MONOHYDRATE AND SODIUM CHLORIDE: 150; 5; 450 INJECTION, SOLUTION INTRAVENOUS at 20:25

## 2023-01-10 RX ADMIN — POTASSIUM CHLORIDE, DEXTROSE MONOHYDRATE AND SODIUM CHLORIDE: 150; 5; 450 INJECTION, SOLUTION INTRAVENOUS at 10:01

## 2023-01-10 RX ADMIN — CEFEPIME 2 G: 2 INJECTION, POWDER, FOR SOLUTION INTRAVENOUS at 14:26

## 2023-01-10 RX ADMIN — OMEPRAZOLE 40 MG: 20 CAPSULE, DELAYED RELEASE ORAL at 16:40

## 2023-01-10 RX ADMIN — HYDROMORPHONE HYDROCHLORIDE 0.2 MG: 1 INJECTION, SOLUTION INTRAMUSCULAR; INTRAVENOUS; SUBCUTANEOUS at 00:46

## 2023-01-10 RX ADMIN — APIXABAN 2.5 MG: 5 TABLET, FILM COATED ORAL at 16:41

## 2023-01-10 RX ADMIN — HYDROCODONE BITARTRATE AND ACETAMINOPHEN 1 TABLET: 5; 325 TABLET ORAL at 10:11

## 2023-01-10 RX ADMIN — IMATINIB MESYLATE 200 MG: 100 TABLET, FILM COATED ORAL at 16:41

## 2023-01-10 RX ADMIN — CALCIUM CARBONATE 1000 MG: 500 TABLET, CHEWABLE ORAL at 05:33

## 2023-01-10 RX ADMIN — APIXABAN 2.5 MG: 5 TABLET, FILM COATED ORAL at 05:33

## 2023-01-10 RX ADMIN — CALCIUM CARBONATE 1000 MG: 500 TABLET, CHEWABLE ORAL at 16:40

## 2023-01-10 RX ADMIN — CEFEPIME 2 G: 2 INJECTION, POWDER, FOR SOLUTION INTRAVENOUS at 22:32

## 2023-01-10 ASSESSMENT — COGNITIVE AND FUNCTIONAL STATUS - GENERAL
TURNING FROM BACK TO SIDE WHILE IN FLAT BAD: UNABLE
DRESSING REGULAR LOWER BODY CLOTHING: A LITTLE
WALKING IN HOSPITAL ROOM: A LITTLE
SUGGESTED CMS G CODE MODIFIER DAILY ACTIVITY: CJ
DAILY ACTIVITIY SCORE: 21
MOVING FROM LYING ON BACK TO SITTING ON SIDE OF FLAT BED: UNABLE
HELP NEEDED FOR BATHING: A LITTLE
TOILETING: A LITTLE
SUGGESTED CMS G CODE MODIFIER MOBILITY: CL
MOBILITY SCORE: 11
STANDING UP FROM CHAIR USING ARMS: A LITTLE
CLIMB 3 TO 5 STEPS WITH RAILING: A LOT
MOVING TO AND FROM BED TO CHAIR: UNABLE

## 2023-01-10 ASSESSMENT — GAIT ASSESSMENTS
DEVIATION: OTHER (COMMENT)
ASSISTIVE DEVICE: FRONT WHEEL WALKER
GAIT LEVEL OF ASSIST: CONTACT GUARD ASSIST
DISTANCE (FEET): 5

## 2023-01-10 ASSESSMENT — PAIN DESCRIPTION - PAIN TYPE
TYPE: ACUTE PAIN

## 2023-01-10 NOTE — PROGRESS NOTES
Medical Social Work    Referral: Pediatric Oncology / Dr. Faye - [facility] was in a bit of a panic this AM (1/9/23) regarding “where do we go from here” financially.  As you remember she has insurance that contracts with [employer].  To my knowledge, JULY is set up with Medicaid and her question does not apply, but I told her I would confirm.  She is worried that JULY will be forced to go back to [employer] when he enters into Maintenance therapy.  Can someone more familiar with his insurance situation please reach out to the mother to discuss this?    Intervention: REGIS contacted [facility] to explain Medicaid benefits. MOP did not answer, SW left v/m detailing benefits stay with patient so long as he remains income eligible following treatment. Thus patient will be able to continue with follow-up post completion of treatment. REGIS encouraged MOP to return call if she has any questions or further clarification.     Plan: REGIS will continue to follow and provide support.

## 2023-01-10 NOTE — THERAPY
Occupational Therapy  Daily Treatment     Patient Name: Tomas Jean-Baptiste  Age:  22 y.o., Sex:  male  Medical Record #: [id number]  Today's Date: 1/10/2023     Precautions  Precautions: Fall Risk  Comments: BLE pain    Assessment    Pt continues to be limited by BLE pain during sessions. He was able to ambulate to/from bathroom with SBA and FWW. He was agreeable to practicing a toilet transfer but was unable to initiate sitting due to pain in BLE. Educated on use of toilet riser, placed one on pt's toilet in the bathroom for him to use at a later time with nursing. He was able to demo some initiation for LB dressing but declined fully donning underwear/pants. He appears to be at a level for d/c home with family support, however continues to be most limited by pain.    Plan    Occupational Therapy Treatment Plan  O.T. Treatment Plan: Continue Current Treatment Plan    DC Equipment Recommendations: Tub / Shower Seat  Discharge Recommendations: Recommend home health for continued occupational therapy services       Objective       01/10/23 1043   Cognition    Cognition / Consciousness WDL   Level of Consciousness Alert   Other Treatments   Other Treatments Provided discussed appropriate DME for home   Balance   Sitting Balance (Static) Fair +   Sitting Balance (Dynamic) Fair   Standing Balance (Static) Fair   Standing Balance (Dynamic) Fair -   Weight Shift Sitting Fair   Weight Shift Standing Fair   Skilled Intervention Verbal Cuing   Comments w/ FWW   Bed Mobility    Supine to Sit Supervised   Sit to Supine Supervised   Scooting Supervised   Skilled Intervention Verbal Cuing   Comments flat bed, utilized rail   Activities of Daily Living   Lower Body Dressing Supervision  (don/doff crocs; discussed how he would don underwear/pants and compensatory strategies. declined to practice)   Toileting   (reports he was able to wipe himself after BM yesterday without help from nursing)   Skilled Intervention Verbal  Cuing;Sequencing   How much help from another person does the patient currently need...   Putting on and taking off regular lower body clothing? 3   Bathing (including washing, rinsing, and drying)? 3   Toileting, which includes using a toilet, bedpan, or urinal? 3   Putting on and taking off regular upper body clothing? 4   Taking care of personal grooming such as brushing teeth? 4   Eating meals? 4   6 Clicks Daily Activity Score 21   Functional Mobility   Sit to Stand Supervised   Bed, Chair, Wheelchair Transfer Supervised   Toilet Transfers Unable to Participate   Mobility EOB>BR>Recliner>BTB   Skilled Intervention Verbal Cuing;Sequencing   Comments w/ FWW. pt attempted to perform toilet txf to practice, however he was unable to attempt to sit as he was having increased BLE pain and was unable to. discussed DME and placed toilet riser over his toilet for him. edu that he is able to take it home with him.   Patient / Family Goals   Patient / Family Goal #1 go home and get walking   Short Term Goals   Short Term Goal # 1 pt will demo toilet txf with supv   Goal Outcome # 1 Goal not met   Short Term Goal # 2 pt will dress LB with supv   Goal Outcome # 2 Progressing as expected   Short Term Goal # 3 pt will demo understanding of tub txf w/ DME with supv   Goal Outcome # 3 Progressing as expected  (pt reports he can use his mom's walk in shower that will fit a chair in it)

## 2023-01-10 NOTE — CARE PLAN
A&Ox4. Up x1 PA for stand and pivot to BCS. Denies nausea. Pain reported 5/10 in lower extremities but resolved by end of shift; treated per MAR and nonpharmacologic interventions.  R port CDI with patent blood return. PIV to L FA. Good PO intake; refuses hospital food; family brings food from home. Refuses bed alarm and ; education provided. POC discussed. All questions answered; patient demonstrates understanding. Call light and belongings within reach, calls appropriately to make needs known; hourly rounding in place.       The patient is Watcher - Medium risk of patient condition declining or worsening    Shift Goals  Clinical Goals: Mobilize. Pain control.  Patient Goals: Up to chair  Family Goals: NA    Progress made toward(s) clinical / shift goals:  Patient up to BSC today and eager to start PT. PCA Dc'd today, no request for PRNs for remainder of shift.     Patient is not progressing towards the following goals: NA

## 2023-01-10 NOTE — DISCHARGE PLANNING
Received Choice form at [phone]  Agency/Facility Name: [facility]   Referral sent per Choice form @ [phone]

## 2023-01-10 NOTE — PROGRESS NOTES
Insurance denied per pharmacy  Asked pharmacy to fax PA  Waiting for fax Pediatric Hematology/Oncology  Daily Progress Note      Patient Name:  Tomas Jean-Baptiste  : 2000  MRN: [id number]    Location of Service:  [facility]    Date of Service: 1/10/2023  Time: 9:00 AM    Hospital Day: 15    Protocol / Treatment Plan:  [place] Chromosome Precursor B-Cell Acute Lymphoblastic Leukemia, ON STUDY [id number], Delayed Intensification, Day 36 (Day 29 DELAYED/HELD)     SUBJECTIVE:     No acute events overnight.  Afebrile with T-max 98.9 °F.  Per report from JULY, overall his day yesterday was a little rundown tired with decreased energy overall.  He reports that he did not have PT or OT, to work with him yesterday.  Otherwise today, he is feeling quite well.  He reports that today his energy and activity are improved and he did work with PT and OT but did tire quite easily.  He reports no headaches.  No neurologic changes.  Vision is good.  No complaints of any nausea, vomiting, diarrhea or constipation.  No abdominal discomfort or upset.  Appetite and oral intake are good.  JULY does not report any easy bruising or bleeding.  He does complain of intermittent leg pain but is only taking oral narcotics at this point to manage his pain.  Yesterday PCA was discontinued.  This morning IV Dilaudid was discontinued.  JULY is able to ambulate to the commode.  No skin changes or rashes.    Review of Systems:     Constitutional: Afebrile, continues to feel better with every day.  Energy and activity are improved.  Appetite and oral intake are improved.  HENT: Negative.  Eyes: Negative for visual changes.  Respiratory: Negative for shortness of breath.  No cough.  Cardiovascular: Negative.  Did have some tachycardia with exertion.  Gastrointestinal: Negative for nausea, vomiting, abdominal pain, diarrhea, constipation.  Genitourinary: Negative for dysuria.  Musculoskeletal: Mild to moderate intermittent leg pain and leg weakness.    Skin: Negative for rash or skin  "infection.  Neurological: Negative for numbness, tingling, sensory changes, weakness or headaches.    Endo/Heme/Allergies: No bruising/bleeding easily.    Psychiatric/Behavioral: Very good mood.     OBJECTIVE:     Max Temp: Temp (24hrs), Av.7 °C (98.1 °F), Min:36.4 °C (97.6 °F), Max:37.2 °C (98.9 °F)    Vitals: /74   Pulse (!) 118   Temp 36.7 °C (98.1 °F) (Temporal)   Resp 17   Ht 1.651 m (5' 5\")   Wt 68.6 kg (151 lb 3.8 oz)   SpO2 97%   BMI 25.17 kg/m²     I/O:   Intake/Output Summary (Last 24 hours) at 1/10/2023 0900  Last data filed at 1/10/2023 0600  Gross per 24 hour   Intake --   Output 3000 ml   Net -3000 ml     Labs:    Most Recent Hematology Labs:    Lab Results   Component Value Date/Time    WBC 2.5 (L) 2023 1318    HEMOGLOBIN 8.2 (L) 2023 1318    MCV 88.0 2023 1318    PLATELETCT 76 (L) 2023 1318    NEUTS 1.78 (L) 2023 1318       Most Recent Metabolic Panel:    Lab Results   Component Value Date/Time    POTASSIUM 4.1 20238    CHLORIDE 99 20238    CO2 23 2023    GLUCOSE 137 (H) 20238    BUN 9 2023 0048    CREATININE 0.50 20238    CALCIUM 8.4 (L) 2023    ANION 10.0 2023     Physical Exam:    Constitutional: Much improved, well appearing.   HENT: Normocephalic and atraumatic. Alopecia.  No rhinorrhea. Oropharynx is clear and moist.  Lips with some crusted blood.  Eyes: Conjunctivae are normal. EOMI. nonicteric.  Neck: Normal range of motion of neck, no adenopathy.    Cardiovascular: Normal rate, regular rhythm.  No murmur. DP/radial pulses 2+, cap refill < 2 sec.  Pulmonary/Chest: Effort normal. No respiratory distress. Symmetric expansion.  No crackles or wheezes.  Abdomen: Soft. Bowel sounds are normal. No distension and no mass. There is no hepatosplenomegaly.    Genitourinary:  Deferred.  Musculoskeletal: Normal range of motion of lower and upper extremities bilaterally. "   Neurological: Alert and oriented to person and place. Exhibits normal muscle tone bilaterally in upper and lower extremities. Gait not assessed.  Skin: Skin is warm, dry and pink.  No rash or evidence of skin infection.   Psychiatric: Mood improved greatly from yesterday.    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 coli and Klebsiella   - Both organisms susceptible to cefepime  - First repeat blood culture obtained 12/30/2022 negative  - S/P pressor support and stress dose hydrocortisone  - Day 11 of cefepime from first (obtained) negative culture   - Did not remove port-a-cath   - Will discuss with ID total duration of abx, but anticipate 14 days course     2) Ph+ Precursor B-Cell Acute Lymphoblastic Leukemia, in MRD Remission:              - 2-3 weeks of symptoms              - Presenting WBC > 440 k/uL, hyperleukocytosis              - Start of Hydroxyurea (1500 mg PO Q8) 2320 on 5/27/2022  - discontinued after only 55 hours              - No steroid pretreatment              - 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              - Testicular status NEGATIVE                   - Flow cytometry of both 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, FISH positive for BCR-ABL1 translocation              - En[place] on [facility] [id number]              - Initially enrolled on [facility] [id number] - but 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                            - Enrolled on [facility] [id number] and began study 6/13/2022              -  Started imatinib therapy 6/3/2022 (split dosing of imatinib 400 mg PO QAM and 200 mg PO QPM) - continued at Day 15 of Induction 1A with imatinib 600 mg PO daily               - No changes needed in imatinib dosing to through first half of Delayed Intensification                            - End of Induction IA and IB MRD negative                         - WBC 2.5, Hgb 8.2, platelets 76 yesterday  - ANC 1780,  yesterday  - Creatinine 0.50 on 1/8/2023                - Imatinib held for Septic Shock 12/27/2022-1/8/2023             - Imatinib 600 mg PO daily restarted 1/8/2023     [id number], AR Arm B, Delayed Intensification, Day 36 (Day 29 therapy being HELD due to Septic Shock):                  2) Chemotherapy and Sepsis Related Pancytopenia:              - WBC 2.5, Hgb 8.2, platelets 76 yesterday  - ANC 1780,  yesterday  - No transfusion indicated  - Transfuse for hemoglobin less than 7  - Transfuse for platelets less than 10                 3) Sixth Cranial Nerve Palsy (IMPROVED/RESOLVED):             - Followed by Dr. Carranza     4) At Risk of Opportunistic Lung Infection:             - Bactrim DS PO BID Sat and Sun to resume this weekend     5) Leg Pain:             - Overall greatly improved            - Able to ambulate            - PCA discontinued            - Dilaudid discontinued            - Hydrocodone 5 mg/325 mg PO Q4PRN mild to moderate pain    6) Social:             - Continue with support     7) Access:               - R Port-A-Cath in place      8) Transaminitis (IMPROVED):             - Most recent labs with AST 35, ALT 73 1/8/2023     9) JACOBY:             - Resolved, Cr. 0.5 1/8/2023     10) At Risk for Deep Venous Thrombosis:             - Restarted apixaban 2.5 mg PO BID given not consistenly ambulatory     11) Research Participant:           [employer] [facility] - Source Data         Diagnosis: Ph+ Precursor B-Cell Acute Lymphoblastic Leukemia     Disease Status: EOI1A MRD  NEGATIVE, EOI1B RD NEGATIVE, CNS3c, testicular negative, HSV1 IgG POSITIVE, CMV IgG NEGATIVE, VARICELLA IgG POSITIVE     Active Studies: [id number], [id number]                                                                                                      Inactive Studies: [id number]                                                                                                                                                Optional Studies: None             Protocol: International Phase 3 trial in [place] 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      Treatment Plan: [id number](OS), AR-Arm B, Delayed Intensification, Day 36     Height: 1.650 m      Weight: 75.5 kg       BSA: 1.86 m²   (Start of Delayed Intensification 12/6/2022)                                                                                                                                           Performance Status: Karnofsky 60, ECOG  3 (1/9/2023)     Treatment Plan Medications:       Currently taking imatinib 600 mg PO daily (Started 1/8/2023)      Evaluations / Study Labs:  (1/9/2023)     None required     Therapy Given: (1/9/2023)     None         12) Discharge Planning:   - Order for durable medical equipment placed (shower chair and walker)   - Per patient, walker improved however shower chair not improved.  Will speak with  regarding approvals and denials.   - PT/OT recommending wheelchair and home nursing   - Will discuss in person with PT OT regarding necessity of home services    Disposition: Discharge planning underway.  Inpatient for additional recovery.     Pepe Faye MD  Pediatric Hematology / Oncology  [facility]  Cell.  [phone]  Office. [phone]

## 2023-01-10 NOTE — DISCHARGE PLANNING
"Case Management Discharge Planning    Admission Date: 12/27/2022  GMLOS: 5  ALOS: 14    6-Clicks ADL Score: 21  6-Clicks Mobility Score: 11  PT and/or OT Eval ordered: Yes  Post-acute Referrals Ordered: Yes  Post-acute Choice Obtained: Yes  Has referral(s) been sent to post-acute provider:  Yes      Anticipated Discharge Dispo: Discharge Disposition: Discharged to home/self care (01)  Discharge Address: [address] [place] [id number]  Discharge Contact Phone Number: [phone]    DME Needed: Yes    DME Ordered: Yes    Action(s) Taken: Updated Provider/Nurse on Discharge Plan, Choice obtained, and Referral(s) sent    Escalations Completed: None    Medically Clear: No    Next Steps: Patient discussed during morning IDT rounds with team. Patient is not medically cleared for discharge at this time. Discharge plan is for patient to go home with Home Health with DME FWW and Shower chair. Choice form for DME sent yesterday by [facility]. CM went to bedside to speak to patient regarding choosing a home health agency for when he goes home. Patient lying in bed, eyes closed, but easily aroused. CM discussed options for home health with patient, he states \"please choose which ever one is contracted with my insurance.\" CM verbally got permission to choose HH agency for pt. Choice sent to [facility] to send referrals out. CM to follow for all discharge planning needs. No other needs at this time.     Barriers to Discharge: Medical clearance    Is the patient up for discharge tomorrow: No       "

## 2023-01-11 PROCEDURE — A9270 NON-COVERED ITEM OR SERVICE: HCPCS | Performed by: HOSPITALIST

## 2023-01-11 PROCEDURE — 700102 HCHG RX REV CODE 250 W/ 637 OVERRIDE(OP): Performed by: PEDIATRICS

## 2023-01-11 PROCEDURE — 700102 HCHG RX REV CODE 250 W/ 637 OVERRIDE(OP): Performed by: HOSPITALIST

## 2023-01-11 PROCEDURE — 700105 HCHG RX REV CODE 258: Performed by: PEDIATRICS

## 2023-01-11 PROCEDURE — A9270 NON-COVERED ITEM OR SERVICE: HCPCS | Performed by: PEDIATRICS

## 2023-01-11 PROCEDURE — 99232 SBSQ HOSP IP/OBS MODERATE 35: CPT | Performed by: PEDIATRICS

## 2023-01-11 PROCEDURE — 700111 HCHG RX REV CODE 636 W/ 250 OVERRIDE (IP): Performed by: PEDIATRICS

## 2023-01-11 PROCEDURE — 700101 HCHG RX REV CODE 250: Performed by: PEDIATRICS

## 2023-01-11 PROCEDURE — 770004 HCHG ROOM/CARE - ONCOLOGY PRIVATE *

## 2023-01-11 RX ADMIN — HYDROCODONE BITARTRATE AND ACETAMINOPHEN 1 TABLET: 5; 325 TABLET ORAL at 21:18

## 2023-01-11 RX ADMIN — POTASSIUM CHLORIDE, DEXTROSE MONOHYDRATE AND SODIUM CHLORIDE: 150; 5; 450 INJECTION, SOLUTION INTRAVENOUS at 09:16

## 2023-01-11 RX ADMIN — APIXABAN 2.5 MG: 5 TABLET, FILM COATED ORAL at 17:24

## 2023-01-11 RX ADMIN — APIXABAN 2.5 MG: 5 TABLET, FILM COATED ORAL at 05:19

## 2023-01-11 RX ADMIN — OMEPRAZOLE 40 MG: 20 CAPSULE, DELAYED RELEASE ORAL at 17:23

## 2023-01-11 RX ADMIN — HYDROCODONE BITARTRATE AND ACETAMINOPHEN 1 TABLET: 5; 325 TABLET ORAL at 12:34

## 2023-01-11 RX ADMIN — IMATINIB MESYLATE 400 MG: 400 TABLET, FILM COATED ORAL at 05:20

## 2023-01-11 RX ADMIN — IMATINIB MESYLATE 200 MG: 100 TABLET, FILM COATED ORAL at 17:24

## 2023-01-11 RX ADMIN — OMEPRAZOLE 40 MG: 20 CAPSULE, DELAYED RELEASE ORAL at 05:20

## 2023-01-11 RX ADMIN — CALCIUM CARBONATE 1000 MG: 500 TABLET, CHEWABLE ORAL at 05:20

## 2023-01-11 RX ADMIN — CEFEPIME 2 G: 2 INJECTION, POWDER, FOR SOLUTION INTRAVENOUS at 13:38

## 2023-01-11 RX ADMIN — HYDROCODONE BITARTRATE AND ACETAMINOPHEN 1 TABLET: 5; 325 TABLET ORAL at 05:20

## 2023-01-11 RX ADMIN — CEFEPIME 2 G: 2 INJECTION, POWDER, FOR SOLUTION INTRAVENOUS at 21:19

## 2023-01-11 RX ADMIN — POTASSIUM CHLORIDE, DEXTROSE MONOHYDRATE AND SODIUM CHLORIDE: 150; 5; 450 INJECTION, SOLUTION INTRAVENOUS at 21:19

## 2023-01-11 RX ADMIN — CALCIUM CARBONATE 1000 MG: 500 TABLET, CHEWABLE ORAL at 17:23

## 2023-01-11 RX ADMIN — CEFEPIME 2 G: 2 INJECTION, POWDER, FOR SOLUTION INTRAVENOUS at 05:21

## 2023-01-11 ASSESSMENT — PAIN DESCRIPTION - PAIN TYPE
TYPE: ACUTE PAIN

## 2023-01-11 NOTE — CARE PLAN
Problem: Fall Risk  Goal: Patient will remain free from falls  Outcome: Progressing     Problem: Mobility  Goal: Patient's capacity to carry out activities will improve  Outcome: Progressing     The patient is Watcher - Medium risk of patient condition declining or worsening    Shift Goals  Clinical Goals: Mobilize. Pain control.  Patient Goals: Up to chair  Family Goals: NA    Progress made toward(s) clinical / shift goals:  Pt is up with PT    Patient is not progressing towards the following goals:

## 2023-01-11 NOTE — CARE PLAN
The patient is Stable - Low risk of patient condition declining or worsening    Shift Goals  Clinical Goals: Pain control  Patient Goals: Sleep      Progress made toward(s) clinical / shift goals:     Problem: Knowledge Deficit - Standard  Goal: Patient and family/care givers will demonstrate understanding of plan of care, disease process/condition, diagnostic tests and medications  Outcome: Progressing     Problem: Pain - Standard  Goal: Alleviation of pain or a reduction in pain to the patient’s comfort goal  Outcome: Progressing     Problem: Skin Integrity  Goal: Skin integrity is maintained or improved  Outcome: Progressing     Problem: Fall Risk  Goal: Patient will remain free from falls  Outcome: Progressing

## 2023-01-11 NOTE — DISCHARGE PLANNING
Agency/Facility Name: [facility]   Spoke To: Liliam  Outcome: Per Liliam, she want to know if pt is going to school or not because that determines if pt is homebound. D[facility] will call Liliam back with updates.     MONTSERRAT Eng notified.

## 2023-01-11 NOTE — CARE PLAN
The patient is Watcher - Medium risk of patient condition declining or worsening    Shift Goals  Clinical Goals: Pt will verbalize a pain level of a 5 or less  Patient Goals: Pt will get up to the bathroom.  Family Goals: Not present    Progress made toward(s) clinical / shift goals:  Pain well controlled with norco. Up to the bathroom with a front wheel walker and one person assist. Tachycardic in the 170's. Generalized weakness.       Problem: Knowledge Deficit - Standard  Goal: Patient and family/care givers will demonstrate understanding of plan of care, disease process/condition, diagnostic tests and medications  Outcome: Progressing     Problem: Pain - Standard  Goal: Alleviation of pain or a reduction in pain to the patient’s comfort goal  Outcome: Progressing     Problem: Skin Integrity  Goal: Skin integrity is maintained or improved  Outcome: Progressing     Problem: Fall Risk  Goal: Patient will remain free from falls  Outcome: Progressing     Problem: Nutrition  Goal: Patient's nutritional and fluid intake will be adequate or improve  Outcome: Progressing     Problem: Bowel Elimination  Goal: Establish and maintain regular bowel function  Outcome: Progressing     Problem: Knowledge Deficit - Standard  Goal: Patient and family/care givers will demonstrate understanding of plan of care, disease process/condition, diagnostic tests and medications  Outcome: Progressing     Problem: Pain - Standard  Goal: Alleviation of pain or a reduction in pain to the patient’s comfort goal  Outcome: Progressing     Problem: Skin Integrity  Goal: Skin integrity is maintained or improved  Outcome: Progressing     Problem: Fall Risk  Goal: Patient will remain free from falls  Outcome: Progressing     Problem: Nutrition  Goal: Patient's nutritional and fluid intake will be adequate or improve  Outcome: Progressing     Problem: Bowel Elimination  Goal: Establish and maintain regular bowel function  Outcome: Progressing        Patient is not progressing towards the following goals:

## 2023-01-11 NOTE — THERAPY
"Physical Therapy   Daily Treatment     Patient Name: Tomas Jean-Baptiste  Age:  22 y.o., Sex:  male  Medical Record #: [id number]  Today's Date: 1/10/2023     Precautions  Precautions: Fall Risk  Comments: B calf pain L>R    Assessment    Pt presents w/ B LE weakness, impaired mobility, balance, activity tolerance, and insight into functional deficits. Therapist attempted to have patient complete 2 steps to mimic home access, however, pt unable due to pain. Pt showed therapists image of walkway to enter home. Once commented on the length of the walkway being >75 ft., pt acknowledged this may be difficult due to his current ability to complete < 20 ft w/ OT earlier to the bathroom. Pt mobilized as detailed below. Recommended to d/c home with assistance from mom and siblings w/ FWW and w/c in order to safely access home. Will continue to follow w/ acute IP therapy for functional deficits.     Plan    Physical Therapy Treatment Plan  Physical Therapy Treatment Plan: Continue Current Treatment Plan    DC Equipment Recommendations: Front-Wheel Walker, Wheelchair (currently recommending w/c due to pt inability to demonstrate ambulation >75 ft w/ FWW or 2 stairs to access home)  Discharge Recommendations: Recommend home health for continued physical therapy services      Subjective    \"I'm in a lot of pain today\"     Objective       01/10/23 1542   Precautions   Precautions Fall Risk   Comments B calf pain L>R   Vitals   O2 Delivery Device None - Room Air   Pain 0 - 10 Group   Therapist Pain Assessment Nurse Notified;During Activity  (number not assigned; pt reported pain in B calves limiting ambulation. Pt unable to bear weight on L LE 2/2 to pain)   Cognition    Cognition / Consciousness WDL   Level of Consciousness Alert   Comments Pleasant and cooperative. Pt demonstrated poor insight about functional level, reporting he is able to ambulate with a FWW  ft and up two steps to access his home, however when " attempted, pt unable to make it greater than 5 ft. Pts current functional level below this, commented on below.   Strength Lower Body   Lower Body Strength  X   Comments Grossly RLE 3+, able to bear weight in standing w/ FWW. Unable to assess LLE 2/2 to pain   Lower Body Muscle Tone   Lower Body Muscle Tone  WDL   Other Treatments   Other Treatments Provided discussed DME necessary to access home with current functional level   Balance   Sitting Balance (Static) Fair +   Sitting Balance (Dynamic) Fair +   Standing Balance (Static) Fair -   Standing Balance (Dynamic) Fair -   Weight Shift Sitting Fair   Weight Shift Standing Fair   Skilled Intervention Verbal Cuing   Comments w/ FWW   Bed Mobility    Supine to Sit Supervised   Sit to Supine Supervised   Scooting Supervised   Skilled Intervention Verbal Cuing   Comments HOB raised maximally. bed rail utilized, VC to indepenently bring legs into bent position w/ feet in bed, rather than using arms to pull them up. Required increased time and effort.   Gait Analysis   Gait Level Of Assist Contact Guard Assist   Assistive Device Front Wheel Walker   Distance (Feet) 5   # of Times Distance was Traveled 1   Deviation Other (Comment)  (Step hop, Pt bearing weight only through R LE)   # of Stairs Climbed 0   Level of Assist with Stairs Unable to Participate  (2/2 to pain)   Skilled Intervention Verbal Cuing   Comments w/ FWW. Pt refused in-room stair practice 2/2 to pain   Functional Mobility   Sit to Stand Contact Guard Assist   Mobility supine to EOB, STS w/ FWW, ambulation w/ FWW, return to supine   Skilled Intervention Verbal Cuing   Comments w/ FWW   ICU Target Mobility Level   ICU Mobility - Targeted Level Level 3A   How much difficulty does the patient currently have...   Turning over in bed (including adjusting bedclothes, sheets and blankets)? 1   Sitting down on and standing up from a chair with arms (e.g., wheelchair, bedside commode, etc.) 1   Moving from lying  on back to sitting on the side of the bed? 1   How much help from another person does the patient currently need...   Moving to and from a bed to a chair (including a wheelchair)? 3   Need to walk in a hospital room? 3   Climbing 3-5 steps with a railing? 2   6 clicks Mobility Score 11   Activity Tolerance   Sitting Edge of Bed 5 min   Standing <1 min   Comments limited 2/2 to pain   Patient / Family Goals    Patient / Family Goal #1 To walk again   Goal #1 Outcome Progressing slower than expected  (limited 2/2 to pain)   Short Term Goals    Short Term Goal # 1 Pt will perform supine <> sit with SPV in 6 visits to return to PLOF   Goal Outcome # 1 Goal met, new goal added   Short Term Goal # 1 B  Pt will perform supine <> sit with HOB flat with SPV in 6 visits to return to PLOF  (Added on 1/10)   Short Term Goal # 2 Pt will perform STS transfer with FWW and SPV in 6 visits to improve functional transfers   Goal Outcome # 2 Goal not met   Short Term Goal # 3 Pt will ambulate 150ft with FWW and SPV in 6 visits to access household distances   Goal Outcome # 3 Goal not met   Short Term Goal # 4 Pt will negotiate 2 steps with Adin in 6 visits to safely enter/exit home   Goal Outcome # 4 Goal not met   Education Group   Education Provided Role of Physical Therapist;Gait Training;Stair Training;Use of Assistive Device   Role of Physical Therapist Patient Response Patient;Acceptance;Explanation;Verbal Demonstration   Gait Training Patient Response Patient;Eager;Explanation;Action Demonstration   Stair Training Patient Response Patient;Acceptance;Demonstration;Verbal Demonstration  (pt unable to perform 2/2 to pain. Therapist reviewed multiple strategies for stair negotiation.)   Use of Assistive Device Patient Response Patient;Acceptance;Explanation;Action Demonstration   Physical Therapy Treatment Plan   Physical Therapy Treatment Plan Continue Current Treatment Plan   Anticipated Discharge Equipment and Recommendations    DC Equipment Recommendations Front-Wheel Walker;Wheelchair  (currently recommending w/c due to pt inability to demonstrate ambulation >75 ft w/ FWW or 2 stairs to access home)   Discharge Recommendations Recommend home health for continued physical therapy services   Interdisciplinary Plan of Care Collaboration   IDT Collaboration with  Nursing   Patient Position at End of Therapy In Bed;Call Light within Reach;Tray Table within Reach;Phone within Reach   Collaboration Comments RN updated   Session Information   Date / Session Number  1/10 - 3 (1/3, 1/16)

## 2023-01-12 ENCOUNTER — APPOINTMENT (OUTPATIENT)
Dept: RADIOLOGY | Facility: MEDICAL CENTER | Age: 23
DRG: 871 | End: 2023-01-12
Attending: PEDIATRICS
Payer: COMMERCIAL

## 2023-01-12 LAB
ABO GROUP BLD: NORMAL
ALBUMIN SERPL BCP-MCNC: 3.2 G/DL (ref 3.2–4.9)
ALBUMIN/GLOB SERPL: 1.4 G/DL
ALP SERPL-CCNC: 89 U/L (ref 30–99)
ALT SERPL-CCNC: 59 U/L (ref 2–50)
ANION GAP SERPL CALC-SCNC: 11 MMOL/L (ref 7–16)
AST SERPL-CCNC: 31 U/L (ref 12–45)
BARCODED ABORH UBTYP: 5100
BARCODED PRD CODE UBPRD: NORMAL
BARCODED UNIT NUM UBUNT: NORMAL
BASOPHILS # BLD AUTO: 0.3 % (ref 0–1.8)
BASOPHILS # BLD: 0.01 K/UL (ref 0–0.12)
BILIRUB SERPL-MCNC: 0.3 MG/DL (ref 0.1–1.5)
BLD GP AB SCN SERPL QL: NORMAL
BUN SERPL-MCNC: 5 MG/DL (ref 8–22)
CALCIUM ALBUM COR SERPL-MCNC: 9.1 MG/DL (ref 8.5–10.5)
CALCIUM SERPL-MCNC: 8.5 MG/DL (ref 8.5–10.5)
CHLORIDE SERPL-SCNC: 105 MMOL/L (ref 96–112)
CO2 SERPL-SCNC: 23 MMOL/L (ref 20–33)
COMPONENT R 8504R: NORMAL
CREAT SERPL-MCNC: 0.49 MG/DL (ref 0.5–1.4)
EOSINOPHIL # BLD AUTO: 0 K/UL (ref 0–0.51)
EOSINOPHIL NFR BLD: 0 % (ref 0–6.9)
ERYTHROCYTE [DISTWIDTH] IN BLOOD BY AUTOMATED COUNT: 51.7 FL (ref 35.9–50)
GFR SERPLBLD CREATININE-BSD FMLA CKD-EPI: 149 ML/MIN/1.73 M 2
GLOBULIN SER CALC-MCNC: 2.3 G/DL (ref 1.9–3.5)
GLUCOSE SERPL-MCNC: 124 MG/DL (ref 65–99)
HCT VFR BLD AUTO: 24.1 % (ref 42–52)
HGB BLD-MCNC: 7.8 G/DL (ref 14–18)
IMM GRANULOCYTES # BLD AUTO: 0.06 K/UL (ref 0–0.11)
IMM GRANULOCYTES NFR BLD AUTO: 1.9 % (ref 0–0.9)
LYMPHOCYTES # BLD AUTO: 0.43 K/UL (ref 1–4.8)
LYMPHOCYTES NFR BLD: 14 % (ref 22–41)
MCH RBC QN AUTO: 28.6 PG (ref 27–33)
MCHC RBC AUTO-ENTMCNC: 32.4 G/DL (ref 33.7–35.3)
MCV RBC AUTO: 88.3 FL (ref 81.4–97.8)
MONOCYTES # BLD AUTO: 0.32 K/UL (ref 0–0.85)
MONOCYTES NFR BLD AUTO: 10.4 % (ref 0–13.4)
NEUTROPHILS # BLD AUTO: 2.26 K/UL (ref 1.82–7.42)
NEUTROPHILS NFR BLD: 73.4 % (ref 44–72)
NRBC # BLD AUTO: 0.07 K/UL
NRBC BLD-RTO: 2.3 /100 WBC
PLATELET # BLD AUTO: 117 K/UL (ref 164–446)
PMV BLD AUTO: 10.6 FL (ref 9–12.9)
POTASSIUM SERPL-SCNC: 3.8 MMOL/L (ref 3.6–5.5)
PRODUCT TYPE UPROD: NORMAL
PROT SERPL-MCNC: 5.5 G/DL (ref 6–8.2)
RBC # BLD AUTO: 2.73 M/UL (ref 4.7–6.1)
RH BLD: NORMAL
SODIUM SERPL-SCNC: 139 MMOL/L (ref 135–145)
UNIT STATUS USTAT: NORMAL
WBC # BLD AUTO: 3.1 K/UL (ref 4.8–10.8)

## 2023-01-12 PROCEDURE — A9270 NON-COVERED ITEM OR SERVICE: HCPCS | Performed by: HOSPITALIST

## 2023-01-12 PROCEDURE — 85025 COMPLETE CBC W/AUTO DIFF WBC: CPT

## 2023-01-12 PROCEDURE — A9270 NON-COVERED ITEM OR SERVICE: HCPCS | Performed by: PEDIATRICS

## 2023-01-12 PROCEDURE — 86945 BLOOD PRODUCT/IRRADIATION: CPT

## 2023-01-12 PROCEDURE — 76882 US LMTD JT/FCL EVL NVASC XTR: CPT | Mod: LT

## 2023-01-12 PROCEDURE — 36430 TRANSFUSION BLD/BLD COMPNT: CPT

## 2023-01-12 PROCEDURE — 700102 HCHG RX REV CODE 250 W/ 637 OVERRIDE(OP): Performed by: PEDIATRICS

## 2023-01-12 PROCEDURE — 700101 HCHG RX REV CODE 250: Performed by: PEDIATRICS

## 2023-01-12 PROCEDURE — 80053 COMPREHEN METABOLIC PANEL: CPT

## 2023-01-12 PROCEDURE — 86850 RBC ANTIBODY SCREEN: CPT

## 2023-01-12 PROCEDURE — 700102 HCHG RX REV CODE 250 W/ 637 OVERRIDE(OP): Performed by: HOSPITALIST

## 2023-01-12 PROCEDURE — 99232 SBSQ HOSP IP/OBS MODERATE 35: CPT | Performed by: PEDIATRICS

## 2023-01-12 PROCEDURE — 770004 HCHG ROOM/CARE - ONCOLOGY PRIVATE *

## 2023-01-12 PROCEDURE — P9016 RBC LEUKOCYTES REDUCED: HCPCS

## 2023-01-12 PROCEDURE — 86900 BLOOD TYPING SEROLOGIC ABO: CPT

## 2023-01-12 PROCEDURE — 700105 HCHG RX REV CODE 258: Performed by: PEDIATRICS

## 2023-01-12 PROCEDURE — 700111 HCHG RX REV CODE 636 W/ 250 OVERRIDE (IP): Performed by: PEDIATRICS

## 2023-01-12 PROCEDURE — 86901 BLOOD TYPING SEROLOGIC RH(D): CPT

## 2023-01-12 PROCEDURE — 86923 COMPATIBILITY TEST ELECTRIC: CPT

## 2023-01-12 RX ADMIN — HYDROCODONE BITARTRATE AND ACETAMINOPHEN 1 TABLET: 5; 325 TABLET ORAL at 13:33

## 2023-01-12 RX ADMIN — OMEPRAZOLE 40 MG: 20 CAPSULE, DELAYED RELEASE ORAL at 04:45

## 2023-01-12 RX ADMIN — CALCIUM CARBONATE 1000 MG: 500 TABLET, CHEWABLE ORAL at 04:45

## 2023-01-12 RX ADMIN — CEFEPIME 2 G: 2 INJECTION, POWDER, FOR SOLUTION INTRAVENOUS at 22:58

## 2023-01-12 RX ADMIN — HYDROCODONE BITARTRATE AND ACETAMINOPHEN 1 TABLET: 5; 325 TABLET ORAL at 04:44

## 2023-01-12 RX ADMIN — CEFEPIME 2 G: 2 INJECTION, POWDER, FOR SOLUTION INTRAVENOUS at 04:52

## 2023-01-12 RX ADMIN — APIXABAN 2.5 MG: 5 TABLET, FILM COATED ORAL at 04:44

## 2023-01-12 RX ADMIN — CEFEPIME 2 G: 2 INJECTION, POWDER, FOR SOLUTION INTRAVENOUS at 15:30

## 2023-01-12 RX ADMIN — APIXABAN 2.5 MG: 5 TABLET, FILM COATED ORAL at 16:58

## 2023-01-12 RX ADMIN — IMATINIB MESYLATE 400 MG: 400 TABLET, FILM COATED ORAL at 04:44

## 2023-01-12 RX ADMIN — OMEPRAZOLE 40 MG: 20 CAPSULE, DELAYED RELEASE ORAL at 16:58

## 2023-01-12 RX ADMIN — IMATINIB MESYLATE 200 MG: 100 TABLET, FILM COATED ORAL at 16:58

## 2023-01-12 RX ADMIN — CALCIUM CARBONATE 1000 MG: 500 TABLET, CHEWABLE ORAL at 16:58

## 2023-01-12 RX ADMIN — POTASSIUM CHLORIDE, DEXTROSE MONOHYDRATE AND SODIUM CHLORIDE: 150; 5; 450 INJECTION, SOLUTION INTRAVENOUS at 21:45

## 2023-01-12 RX ADMIN — HYDROCODONE BITARTRATE AND ACETAMINOPHEN 1 TABLET: 5; 325 TABLET ORAL at 21:44

## 2023-01-12 RX ADMIN — POTASSIUM CHLORIDE, DEXTROSE MONOHYDRATE AND SODIUM CHLORIDE: 150; 5; 450 INJECTION, SOLUTION INTRAVENOUS at 08:21

## 2023-01-12 ASSESSMENT — PAIN DESCRIPTION - PAIN TYPE
TYPE: ACUTE PAIN

## 2023-01-12 NOTE — DISCHARGE PLANNING
CM called patient's mother this am, left message to return call. CM went to bedside to speak to patient regarding discharge planning. Patient and mother had spoke to DPA yesterday regarding home health, stating they did not need it. CM inquired about it with patient this am, he declined HH. Patient was requesting FWW instead of WC. CM reached out to RN to see if FWW was in room or if it needed to be gotten from [facility]. Patient's mother, Zeinab, returned call. She was inquiring if patient went home without HH but then they decide after discharge that he may need it, what do they do. MARY instructed mother that if after discharge they make that decision, patient would need to see PCP for an order for HH to be sent out. She voiced understanding. No other needs at this time.

## 2023-01-12 NOTE — CARE PLAN
The patient is Watcher - Medium risk of patient condition declining or worsening    Shift Goals  Clinical Goals: Pt will remain free from injury.  Patient Goals: Pt will be able to shower and discharge home.  Family Goals: Not present.    Progress made toward(s) clinical / shift goals:  ***      Problem: Knowledge Deficit - Standard  Goal: Patient and family/care givers will demonstrate understanding of plan of care, disease process/condition, diagnostic tests and medications  Outcome: Progressing     Problem: Pain - Standard  Goal: Alleviation of pain or a reduction in pain to the patient’s comfort goal  Outcome: Progressing     Problem: Skin Integrity  Goal: Skin integrity is maintained or improved  Outcome: Progressing     Problem: Fall Risk  Goal: Patient will remain free from falls  Outcome: Progressing     Problem: Hemodynamics  Goal: Patient's hemodynamics, fluid balance and neurologic status will be stable or improve  Outcome: Progressing       Patient is not progressing towards the following goals:

## 2023-01-12 NOTE — DISCHARGE PLANNING
Agency/Facility Name: [facility]   Outcome: DPA left a voicemail stating that pt and family does not want HH. DPA left contact information for Liliam if she has any questions or concerns.

## 2023-01-12 NOTE — PROGRESS NOTES
Pediatric Hematology/Oncology  Daily Progress Note      Patient Name:  Tomas Jean-Baptiste  : 2000  MRN: [id number]    Location of Service:  Cancer Nursing Unit  Date of Service: 2023  Time: 1:20 PM    Hospital Day: 17    Protocol / Treatment Plan:  [place] Chromosome Precursor B-Cell Acute Lymphoblastic Leukemia, ON STUDY [id number], Delayed Intensification, Day 38 (Day 29 DELAYED/HELD)    SUBJECTIVE:     No acute events overnight.  Afebrile with T-max 98.5 °F. Tomas Roy reports that PT and OT did not work with him yesterday.  He does report however that he is doing much better and feels as though he has considerably more strength today than he even did yesterday.  He reports that he has been able to get up into the bathroom of his own accord using a walker.  He reports that the pain in his legs still remains however it is not any worse.  He reports that his left leg is more sore than his right.  He reports that not only is there more strength but there is more flexibility and range of motion.  He has been able to assist in his own ADL.  No complaints of any headaches, shortness of breath or difficulties with breathing.  Still with tachycardia with getting up out of bed.  Some tachypnea, but he is steady on feet and does not get dizzy.  No complaints of any bleeding or bruising.  No complaints of any skin rashes.  Stooling and voiding well.  No complaints of abdominal pain or discomfort.  No complaints of any nausea or vomiting.  Taking imatinib as prescribed.    Review of Systems:     Constitutional: Afebrile, continues to feel better with every day.  Good energy.  Good appetite and oral intake.  HENT: Negative.  Eyes: Negative for visual changes.  Respiratory: Negative for shortness of breath.  Cardiovascular: Negative.  Gastrointestinal: Negative for nausea, vomiting, abdominal pain, diarrhea, constipation.  Genitourinary: Negative.  Musculoskeletal: Still with moderate leg pain when  "walking.  No pain while at rest.  Improved with stretching exercises.  Skin: Negative for rash or skin infection.  Neurological: Negative for numbness, tingling, sensory changes, weakness or headaches.    Endo/Heme/Allergies: No bruising/bleeding easily.    Psychiatric/Behavioral: Good mood.     OBJECTIVE:     Max Temp: Temp (24hrs), Av.7 °C (98.1 °F), Min:36.4 °C (97.5 °F), Max:36.9 °C (98.5 °F)    Vitals: /87   Pulse (!) 110   Temp 36.9 °C (98.4 °F) (Oral)   Resp 18   Ht 1.651 m (5' 5\")   Wt 68.6 kg (151 lb 3.8 oz)   SpO2 99%   BMI 25.17 kg/m²     I/O:   Intake/Output Summary (Last 24 hours) at 2023 1320  Last data filed at 2023 0817  Gross per 24 hour   Intake --   Output 2000 ml   Net -2000 ml       Labs:    Most Recent Hematology Labs:    Lab Results   Component Value Date/Time    WBC 3.1 (L) 2023 0822    HEMOGLOBIN 7.8 (L) 2023 0822    MCV 88.3 2023 0822    PLATELETCT 117 (L) 2023 08    NEUTS 2.26 2023 08       Most Recent Metabolic Panel:    Lab Results   Component Value Date/Time    POTASSIUM 3.8 2023 0822    CHLORIDE 105 2023 0822    CO2 23 2023 0822    GLUCOSE 124 (H) 2023 0822    BUN 5 (L) 2023 0822    CREATININE 0.49 (L) 2023 0822    CALCIUM 8.5 2023 0822    ANION 11.0 2023 08     Physical Exam:    Constitutional: Overall well-appearing.  HENT: Normocephalic and atraumatic. Alopecia.  No rhinorrhea. Oropharynx is clear and moist.  Improved dryness of the lips.  Eyes: Conjunctivae are normal. EOMI. nonicteric.  Neck: Normal range of motion of neck, no adenopathy.    Cardiovascular: Normal rate, regular rhythm.  No murmur. DP/radial pulses 2+, cap refill < 2 sec.  Pulmonary/Chest: Effort normal. No respiratory distress. Symmetric expansion.  No crackles or wheezes.  Abdomen: Soft. Bowel sounds are normal. No distension and no mass. There is no hepatosplenomegaly.    Genitourinary:  " Deferred.  Musculoskeletal: Normal range of motion of lower and upper extremities bilaterally.  Tenderness to palpation to left lateral middle calf.  Approximately 5 cm palpable mass in calf.    Neurological: Alert and oriented to person and place.  Stable ambulation with walker.  Skin: Skin is warm, dry and pink.  No rash or evidence of skin infection.   Psychiatric: Mood is very good.    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 coli and Klebsiella   - Both organisms susceptible to cefepime  - First repeat blood culture obtained 12/30/2022 negative  - S/P pressor support and stress dose hydrocortisone  - Day 13 of cefepime from first (obtained) negative culture               - Did not remove port-a-cath               - Will discuss with ID total duration of abx, but anticipate 14 days course     2) Ph+ Precursor B-Cell Acute Lymphoblastic Leukemia, in MRD Remission:              - 2-3 weeks of symptoms              - Presenting WBC > 440 k/uL, hyperleukocytosis              - Start of Hydroxyurea (1500 mg PO Q8) 2320 on 5/27/2022  - discontinued after only 55 hours              - No steroid pretreatment              - 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              - Testicular status NEGATIVE                   - Flow cytometry of both 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, FISH positive for BCR-ABL1 translocation              - Enrolled on [facility] [id number]              - Initially enrolled on [facility] [id number]  - but 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                            - Enrolled on [facility] [id number] and began study 6/13/2022              - Started imatinib therapy 6/3/2022 (split dosing of imatinib 400 mg PO QAM and 200 mg PO QPM) - continued at Day 15 of Induction 1A with imatinib 600 mg PO daily               - No changes needed in imatinib dosing to through first half of Delayed Intensification                            - End of Induction IA and IB MRD negative                         - Most recent laboratories from 1/9/2023 demonstrating Hgb 8.2, platelets 76 with ANC of 1780              - Given possibility for further decreased hemoglobin leading to tachycardia, will obtain CBC in AM                - Imatinib held for Septic Shock 12/27/2022-1/8/2023             - Imatinib 600 mg PO daily restarted 1/8/2023     [id number], AR Arm B, Delayed Intensification, Day 38 (Day 29 therapy being HELD due to Septic Shock):                  2) Chemotherapy and Sepsis Related Pancytopenia:              - WBC 3.1, Hgb 7.8, platelets 117  - ANC 2260,   - Given significant tachycardia with standing up and ambulating as well as hemoglobin of 7.8, will transfuse 1 unit PRBCs today to optimize oxygen carrying capacity  - Transfuse for hemoglobin less than 7  - Transfuse for platelets less than 10         3) Sixth Cranial Nerve Palsy (IMPROVED/RESOLVED):             - Followed by Dr. Carranza     4) At Risk of Opportunistic Lung Infection:             - Bactrim DS PO BID Sat and Sun to resume this weekend     5) Leg Pain:            - Continues to have moderate leg pain with ambulation, reports asymmetry with left worse than right            - Able to ambulate very short distances but does complain of pain               - Hydrocodone 5 mg/325 mg PO Q4PRN mild pain -using sparingly    6) Left Leg Mass:   - Asymmetric leg pain with left greater than right   - Palpable 5 cm mass over the left lateral middle calf  -  Ultrasound to evaluate soft tissue mass     7) Social:             - Continue with support             - Continue school as tolerated     8) Access:               - R Port-A-Cath in place      9) At Risk for Deep Venous Thrombosis:             - Restarted apixaban 2.5 mg PO BID given a lack of consistent ambulation     10) Research Participant:           [employer] [facility] - Source Data         Diagnosis: Ph+ Precursor B-Cell Acute Lymphoblastic Leukemia     Disease Status: EOI1A MRD NEGATIVE, EOI1B RD NEGATIVE, CNS3c, testicular negative, HSV1 IgG POSITIVE, CMV IgG NEGATIVE, VARICELLA IgG POSITIVE     Active Studies: [id number], [id number]                                                                                                      Inactive Studies: [id number]                                                                                                                                                Optional Studies: None             Protocol: International Phase 3 trial in [place] 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      Treatment Plan: [id number](OS), AR-Arm B, Delayed Intensification, Day 38     Height: 1.650 m      Weight: 75.5 kg       BSA: 1.86 m²   (Start of Delayed Intensification 12/6/2022)                                                                                                                                           Performance Status: Karnofsky 60, ECOG  3 (1/9/2023)     Treatment Plan Medications:       Currently taking imatinib 600 mg PO daily (Started 1/8/2023)      Evaluations / Study Labs:  (1/12/2023)     None required     Therapy Given: (1/12/2023)     None         Disposition: Discharge planning underway.  Inpatient for additional recovery.     Pepe Faye MD  Pediatric Hematology / Oncology  [facility]  Cell.  [phone]  Office. [phone]

## 2023-01-12 NOTE — CARE PLAN
The patient is Stable - Low risk of patient condition declining or worsening    Shift Goals  Clinical Goals: Pain control  Patient Goals: Sleep      Progress made toward(s) clinical / shift goals:     Problem: Knowledge Deficit - Standard  Goal: Patient and family/care givers will demonstrate understanding of plan of care, disease process/condition, diagnostic tests and medications  Outcome: Progressing     Problem: Pain - Standard  Goal: Alleviation of pain or a reduction in pain to the patient’s comfort goal  Outcome: Progressing

## 2023-01-12 NOTE — PROGRESS NOTES
"Pediatric Hematology/Oncology  Daily Progress Note      Patient Name:  Tomas Jean-Baptiste  : 2000  MRN: [id number]    Location of Service:  [facility]  Date of Service: 2023  Time: 9:00 AM    Hospital Day: 16    Protocol / Treatment Plan:  [place] Chromosome Precursor B-Cell Acute Lymphoblastic Leukemia, ON STUDY [id number], Delayed Intensification, Day 37 (Day 29 DELAYED/HELD)    SUBJECTIVE:     No acute events overnight.  Afebrile T-max 98.6 °F.  Report from nursing that JULY remains significantly deconditioned.  PT and OT worked with him yesterday however therapy session was limited by bilateral lower extremity pain and session was cut short per report. Tomas Roy reports that yesterday he was \"just an off day\" and he reports that his energy is much improved this morning.  He also reports that his pain is considerably improved after an adjustment in his outlook.  Both PCA and IV Dilaudid have been discontinued and JULY is only using oral narcotics at this time.  He reports however that with some simple stretching his pain has improved considerably.  He reports that he has been to and from the restroom already this morning.  Nursing reports that with any kind of activity or exertion JULY becomes quickly tachycardic into the 170s and 180s and quickly tires.  This morning, JULY does not complain of any headaches or changes in vision.  He does not complain of any neurologic status changes.  Pain as above is nearly resolved per his report.  No nausea, vomiting, diarrhea or constipation.  JULY has not had any easy bruising or bleeding.  No rashes or skin changes.  No other concerns or complaints at this time.    Review of Systems:     Constitutional: Afebrile, feeling much better than yesterday.  Improved energy per his report.  HENT: Negative for auditory changes or ear pain, no nosebleeds, no congestion and no rhinorrhea, no sore throat.  No mouth sores.  Eyes: Negative for visual " "changes.  Respiratory: Negative for shortness of breath.  Cardiovascular: Tachycardia as above.  Gastrointestinal: Negative for nausea, vomiting, abdominal pain, diarrhea, constipation.  Genitourinary: Negative.  Musculoskeletal: Leg pain nearly resolved as above.  Skin: Negative for rash or skin infection.  Neurological: Negative for numbness, tingling, sensory changes.  Overall weakness.    Endo/Heme/Allergies: No bruising/bleeding easily.    Psychiatric/Behavioral: Very good mood.     OBJECTIVE:     Max Temp: Temp (24hrs), Av.6 °C (97.9 °F), Min:36.2 °C (97.1 °F), Max:37 °C (98.6 °F)    Vitals: /82   Pulse (!) 115   Temp 36.8 °C (98.2 °F) (Temporal)   Resp 18   Ht 1.651 m (5' 5\")   Wt 68.6 kg (151 lb 3.8 oz)   SpO2 97%   BMI 25.17 kg/m²     I/O:   Intake/Output Summary (Last 24 hours) at 2023 2242  Last data filed at 2023 1630  Gross per 24 hour   Intake --   Output 1800 ml   Net -1800 ml       Labs:    Most Recent Hematology Labs:    Lab Results   Component Value Date/Time    WBC 2.5 (L) 2023 1318    HEMOGLOBIN 8.2 (L) 2023 1318    MCV 88.0 2023 1318    PLATELETCT 76 (L) 2023 1318    NEUTS 1.78 (L) 2023 1318       Most Recent Metabolic Panel:    Lab Results   Component Value Date/Time    POTASSIUM 4.1 2023 0048    CHLORIDE 99 2023 0048    CO2 23 20238    GLUCOSE 137 (H) 2023    BUN 9 2023    CREATININE 0.50 2023    CALCIUM 8.4 (L) 2023    ANION 10.0 2023       Physical Exam:    Constitutional: Much improved, overall well appearing.    HENT: Normocephalic and atraumatic. Alopecia.  No rhinorrhea. Oropharynx is clear and moist.   Eyes: Conjunctivae are normal. EOMI.   Neck: Normal range of motion of neck, no adenopathy.    Cardiovascular: Tachycardic to 110 while at rest, regular rhythm.  No murmur. DP/radial pulses 2+, cap refill < 2 sec.  Pulmonary/Chest: Effort normall. No " respiratory distress. Symmetric expansion.  No crackles or wheezes.  Abdomen: Soft. Bowel sounds are normal. No distension and no mass. There is no hepatosplenomegaly.    Genitourinary:  Deferred  Musculoskeletal: Normal range of motion of lower and upper extremities bilaterally.   Neurological: Alert and oriented to person and place. Exhibits normal muscle tone bilaterally in upper and lower extremities. Gait not assessed.  Skin: Skin is warm, dry and pink.  No rash or evidence of skin infection.   Psychiatric: Mood improved greatly from yesterday.    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 coli and Klebsiella   - Both organisms susceptible to cefepime  - First repeat blood culture obtained 12/30/2022 negative  - S/P pressor support and stress dose hydrocortisone  - Day 10 of cefepime from first (obtained) negative culture               - Did not remove port-a-cath               - Will discuss with ID total duration of abx, but anticipate 14 days course     2) Ph+ Precursor B-Cell Acute Lymphoblastic Leukemia, in MRD Remission:              - 2-3 weeks of symptoms              - Presenting WBC > 440 k/uL, hyperleukocytosis              - Start of Hydroxyurea (1500 mg PO Q8) 2320 on 5/27/2022  - discontinued after only 55 hours              - No steroid pretreatment              - 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 [id number]              - Initially enrolled on [facility] [id number] - but 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                            - Enrolled on [facility] [id number] and began study 6/13/2022              - Started imatinib therapy 6/3/2022 (split dosing of imatinib 400 mg PO QAM and 200 mg PO QPM) - continued at Day 15 of Induction 1A with imatinib 600 mg PO daily               - No changes needed in imatinib dosing to through first half of Delayed Intensification                            - End of Induction IA and IB MRD negative                         - Most recent laboratories from 1/9/2023 demonstrating Hgb 8.2, platelets 76 with ANC of 1780      - Given possibility for further decreased hemoglobin leading to tachycardia, will obtain CBC in AM                - Imatinib held for Septic Shock 12/27/2022-1/8/2023             - Imatinib 600 mg PO daily restarted 1/8/2023     [place] [id number], AR Arm B, Delayed Intensification, Day 36 (Day 29 therapy being HELD due to Septic Shock):                  2) Chemotherapy and Sepsis Related Pancytopenia:              - WBC 2.5, Hgb 8.2, platelets 76 on 1/9/2023  - ANC 1780,  on 1/9/2023  - No transfusion indicated  - Transfuse for hemoglobin less than 7  - Transfuse for platelets less than 10        - Repeat labs in AM.             3) Sixth Cranial Nerve Palsy (IMPROVED/RESOLVED):             - Followed by Dr. Carranza     4) At Risk of Opportunistic Lung Infection:             - Bactrim DS PO BID Sat and Sun to resume this weekend     5) Leg Pain:            - Overall greatly improved per Joce however PT and OT limited by leg pain            - Able to ambulate very short distances without complaining of pain              - Hydrocodone 5 mg/325 mg PO Q4PRN mild pain -using sparingly    6) Social:             - Continue with support             - Continue school as  tolerated     7) Access:               - R Port-A-Cath in place      8) Transaminitis (IMPROVED):             - Most recent labs with AST 35, ALT 73     9) JACOBY:             - Resolved, Cr. 0.5 on 1/9/2023     10) At Risk for Deep Venous Thrombosis:             - Restarted apixaban 2.5 mg PO BID given a lack of consistent ambulation     11) Research Participant:           [employer] - Source Data         Diagnosis: Ph+ Precursor B-Cell Acute Lymphoblastic Leukemia     Disease Status: EOI1A MRD NEGATIVE, EOI1B RD NEGATIVE, CNS3c, testicular negative, HSV1 IgG POSITIVE, CMV IgG NEGATIVE, VARICELLA IgG POSITIVE     Active Studies: [id number], [id number]                                                                                                      Inactive Studies: [id number]                                                                                                                                                Optional Studies: None             Protocol: International Phase 3 trial in [place] 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      Treatment Plan: [id number](OS), AR-Arm B, Delayed Intensification, Day 36     Height: 1.650 m      Weight: 75.5 kg       BSA: 1.86 m²   (Start of Delayed Intensification 12/6/2022)                                                                                                                                           Performance Status: Karnofsky 60, ECOG  3 (1/9/2023)     Treatment Plan Medications:       Currently taking imatinib 600 mg PO daily (Started 1/8/2023)      Evaluations / Study Labs:  (1/11/2023)     None required     Therapy Given: (1/11/2023)     None         Disposition: Discharge planning underway.  Inpatient for additional recovery.     Pepe Faye MD  Pediatric Hematology / Oncology  [facility]  Cell.  [phone]  Office.  [phone]

## 2023-01-13 ENCOUNTER — APPOINTMENT (OUTPATIENT)
Dept: RADIOLOGY | Facility: MEDICAL CENTER | Age: 23
DRG: 871 | End: 2023-01-13
Attending: PEDIATRICS
Payer: COMMERCIAL

## 2023-01-13 ENCOUNTER — PATIENT OUTREACH (OUTPATIENT)
Dept: PEDIATRIC HEMATOLOGY/ONCOLOGY | Facility: OUTPATIENT CENTER | Age: 23
End: 2023-01-13
Payer: MEDICAID

## 2023-01-13 PROCEDURE — 97530 THERAPEUTIC ACTIVITIES: CPT

## 2023-01-13 PROCEDURE — 700111 HCHG RX REV CODE 636 W/ 250 OVERRIDE (IP): Performed by: PEDIATRICS

## 2023-01-13 PROCEDURE — 73701 CT LOWER EXTREMITY W/DYE: CPT | Mod: LT

## 2023-01-13 PROCEDURE — 700117 HCHG RX CONTRAST REV CODE 255: Performed by: PEDIATRICS

## 2023-01-13 PROCEDURE — 700102 HCHG RX REV CODE 250 W/ 637 OVERRIDE(OP): Performed by: PEDIATRICS

## 2023-01-13 PROCEDURE — 700102 HCHG RX REV CODE 250 W/ 637 OVERRIDE(OP): Performed by: HOSPITALIST

## 2023-01-13 PROCEDURE — 99232 SBSQ HOSP IP/OBS MODERATE 35: CPT | Performed by: INTERNAL MEDICINE

## 2023-01-13 PROCEDURE — 700105 HCHG RX REV CODE 258: Performed by: PEDIATRICS

## 2023-01-13 PROCEDURE — A9270 NON-COVERED ITEM OR SERVICE: HCPCS | Performed by: PEDIATRICS

## 2023-01-13 PROCEDURE — A9270 NON-COVERED ITEM OR SERVICE: HCPCS | Performed by: HOSPITALIST

## 2023-01-13 PROCEDURE — 97116 GAIT TRAINING THERAPY: CPT

## 2023-01-13 PROCEDURE — 700101 HCHG RX REV CODE 250: Performed by: PEDIATRICS

## 2023-01-13 PROCEDURE — 770004 HCHG ROOM/CARE - ONCOLOGY PRIVATE *

## 2023-01-13 PROCEDURE — 73701 CT LOWER EXTREMITY W/DYE: CPT | Mod: RT

## 2023-01-13 RX ADMIN — OMEPRAZOLE 40 MG: 20 CAPSULE, DELAYED RELEASE ORAL at 20:26

## 2023-01-13 RX ADMIN — CALCIUM CARBONATE 1000 MG: 500 TABLET, CHEWABLE ORAL at 20:26

## 2023-01-13 RX ADMIN — CALCIUM CARBONATE 1000 MG: 500 TABLET, CHEWABLE ORAL at 05:52

## 2023-01-13 RX ADMIN — ALTEPLASE 2 MG: 2.2 INJECTION, POWDER, LYOPHILIZED, FOR SOLUTION INTRAVENOUS at 23:40

## 2023-01-13 RX ADMIN — OMEPRAZOLE 40 MG: 20 CAPSULE, DELAYED RELEASE ORAL at 05:52

## 2023-01-13 RX ADMIN — CEFEPIME 2 G: 2 INJECTION, POWDER, FOR SOLUTION INTRAVENOUS at 05:57

## 2023-01-13 RX ADMIN — IMATINIB MESYLATE 400 MG: 400 TABLET, FILM COATED ORAL at 05:55

## 2023-01-13 RX ADMIN — IMATINIB MESYLATE 200 MG: 100 TABLET, FILM COATED ORAL at 20:26

## 2023-01-13 RX ADMIN — POTASSIUM CHLORIDE, DEXTROSE MONOHYDRATE AND SODIUM CHLORIDE: 150; 5; 450 INJECTION, SOLUTION INTRAVENOUS at 13:16

## 2023-01-13 RX ADMIN — APIXABAN 2.5 MG: 5 TABLET, FILM COATED ORAL at 13:08

## 2023-01-13 RX ADMIN — HYDROCODONE BITARTRATE AND ACETAMINOPHEN 1 TABLET: 5; 325 TABLET ORAL at 20:26

## 2023-01-13 RX ADMIN — IOHEXOL 100 ML: 350 INJECTION, SOLUTION INTRAVENOUS at 19:15

## 2023-01-13 ASSESSMENT — GAIT ASSESSMENTS
DISTANCE (FEET): 100
GAIT LEVEL OF ASSIST: STANDBY ASSIST
ASSISTIVE DEVICE: FRONT WHEEL WALKER

## 2023-01-13 ASSESSMENT — ENCOUNTER SYMPTOMS
SHORTNESS OF BREATH: 0
CHILLS: 0
FEVER: 0
HEADACHES: 0
NAUSEA: 0
ABDOMINAL PAIN: 0
HEMOPTYSIS: 0
VOMITING: 0
DIZZINESS: 0
COUGH: 0
DIARRHEA: 0

## 2023-01-13 ASSESSMENT — COGNITIVE AND FUNCTIONAL STATUS - GENERAL
WALKING IN HOSPITAL ROOM: A LITTLE
STANDING UP FROM CHAIR USING ARMS: A LITTLE
SUGGESTED CMS G CODE MODIFIER MOBILITY: CK
CLIMB 3 TO 5 STEPS WITH RAILING: A LITTLE
MOVING TO AND FROM BED TO CHAIR: A LITTLE
MOBILITY SCORE: 18
MOVING FROM LYING ON BACK TO SITTING ON SIDE OF FLAT BED: A LITTLE
TURNING FROM BACK TO SIDE WHILE IN FLAT BAD: A LITTLE

## 2023-01-13 ASSESSMENT — PAIN DESCRIPTION - PAIN TYPE: TYPE: ACUTE PAIN

## 2023-01-13 NOTE — THERAPY
"Physical Therapy   Daily Treatment     Patient Name: Tomas Jean-Baptiste  Age:  22 y.o., Sex:  male  Medical Record #: [id number]  Today's Date: 1/13/2023     Precautions  Precautions: Fall Risk  Comments: B calf pain L>R    Assessment    Pt progressing as expected, all goals met. Pt able to ambulate in hallway with FWW and negotiate 2 steps with little assist. Pt with good awareness of activity tolerance, insight into when his HR is likely high and when he needs to take a rest break; RN received call that pt's HR into 170's with ambulation. Recommend home with HH and assist from mother/siblings. Pt reports he has a 4WW at home, no longer requiring w/c as he is functionally capable of ambulating distance of walkway and negotiating 2 steps to enter his home. Encouraged pt to continue daily mobilization, especially in hallway as able to maintain current functional level. Patient will not be actively followed for physical therapy services at this time, however may be seen if requested by physician for 1 more visit within 30 days to address any discharge or equipment needs.     Plan    Physical Therapy Treatment Plan  Physical Therapy Treatment Plan: Modify Current Treatment Plan  Modified Plan: Discharge Secondary to Goals Met    DC Equipment Recommendations: None (reports has 4WW, no longer requires W/C)  Discharge Recommendations: Recommend home health for continued physical therapy services      Subjective    \"I just wanted to prove that I'm strong enough to do it.\"      Objective     01/13/23 1431   Vitals   O2 Delivery Device None - Room Air   Vitals Comments HR up to 170's with ambulation per RN via telemonitor, pt requiring seated rest. No c/o chest pain   Pain 0 - 10 Group   Therapist Pain Assessment Nurse Notified  (L calf pain not rated, agreeable to mobility)   Cognition    Cognition / Consciousness WDL   Level of Consciousness Alert   Comments Pleasant and cooperative   Active ROM Lower Body    Active " ROM Lower Body  WDL   Strength Lower Body   Lower Body Strength  X   Comments Grossly 4/5 RLE, 3+/5 LLE 2/2 pain   Supine Lower Body Exercise   Comments reveiwed handout and discussed seated therex (ankle pumps, LAQ, repetitive STS with RN staff present)   Balance   Sitting Balance (Static) Fair +   Sitting Balance (Dynamic) Fair +   Standing Balance (Static) Fair   Standing Balance (Dynamic) Fair -   Weight Shift Sitting Fair   Weight Shift Standing Fair   Skilled Intervention Verbal Cuing   Comments w/ FWW   Bed Mobility    Supine to Sit Supervised   Sit to Supine Supervised   Scooting Supervised   Gait Analysis   Gait Level Of Assist Standby Assist   Assistive Device Front Wheel Walker   Distance (Feet) 100   # of Times Distance was Traveled 2   Deviation   (step hop, improved weight bearing on LLE, however, still minimal)   # of Stairs Climbed 2   Level of Assist with Stairs Contact Guard Assist  (use of L rail)   Weight Bearing Status no restrictions   Skilled Intervention Sequencing;Verbal Cuing;Tactile Cuing;Compensatory Strategies   Functional Mobility   Sit to Stand Contact Guard Assist   Bed, Chair, Wheelchair Transfer Supervised   Transfer Method Stand Step   Mobility stairs in room, w/ FWW in hallway, back to bed   Skilled Intervention Verbal Cuing   How much difficulty does the patient currently have...   Turning over in bed (including adjusting bedclothes, sheets and blankets)? 3   Sitting down on and standing up from a chair with arms (e.g., wheelchair, bedside commode, etc.) 3   Moving from lying on back to sitting on the side of the bed? 3   How much help from another person does the patient currently need...   Moving to and from a bed to a chair (including a wheelchair)? 3   Need to walk in a hospital room? 3   Climbing 3-5 steps with a railing? 3   6 clicks Mobility Score 18   Activity Tolerance   Sitting Edge of Bed < 5min   Standing 15 min total   Patient / Family Goals    Patient / Family Goal  #1 To walk again   Goal #1 Outcome Goal met   Short Term Goals    Short Term Goal # 1 Pt will perform supine <> sit with SPV in 6 visits to return to PLOF   Goal Outcome # 1 Goal met, new goal added   Short Term Goal # 1 B  Pt will perform supine <> sit with HOB flat with SPV in 6 visits to return to PLOF   Goal Outcome  # 1 B Goal met   Short Term Goal # 2 Pt will perform STS transfer with FWW and SPV in 6 visits to improve functional transfers   Goal Outcome # 2 Goal met   Short Term Goal # 3 Pt will ambulate 150ft with FWW and SPV in 6 visits to access household distances   Goal Outcome # 3 Goal met   Short Term Goal # 4 Pt will negotiate 2 steps with Adin in 6 visits to safely enter/exit home   Goal Outcome # 4 Goal met   Education Group   Education Provided Stair Training;Gait Training   Gait Training Patient Response Patient;Acceptance;Explanation;Action Demonstration   Stair Training Patient Response Patient;Acceptance;Explanation;Demonstration;Verbal Demonstration;Action Demonstration   Physical Therapy Treatment Plan   Physical Therapy Treatment Plan Modify Current Treatment Plan   Modified Plan Discharge Secondary to Goals Met   Anticipated Discharge Equipment and Recommendations   DC Equipment Recommendations None  (reports has 4WW, no longer requires W/C)   Discharge Recommendations Recommend home health for continued physical therapy services   Interdisciplinary Plan of Care Collaboration   IDT Collaboration with  Nursing   Patient Position at End of Therapy In Bed;Call Light within Reach;Tray Table within Reach;Phone within Reach   Collaboration Comments RN updated   Session Information   Date / Session Number  1/13- 4: Goals met, d/c needs

## 2023-01-13 NOTE — PROGRESS NOTES
Infectious Disease Progress Note    Author: Brenda Mcfarland M.D. Date & Time of service: 2023  11:14 AM    Chief Complaint:  Follow-up for left calf fluid collection concerning for abscess  Gram negative sepsis  Neutropenia    Interval History:   22 y.o. 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 Found also to be in septic shock:blood cultures Ecoli and Kleb   AF (99.9) WBC 0.1 ANC 0 tolerating abx remains weak and edematous   AF (99.4) WBC 0.1 potassium 2.9 trop 155 platelets 9 sleeping soundly at time of rounds   AF WBC 0.2 alert today-feeling a little better-no further active bleeding No pressors   AF WBC 0.4 up to chair-weak and c/o BLE pain. Continued melena noted  1/3 AF WBC 1.1 ANC greater than 500 No new complaints Legs still hurt   patient afebrile, ID reconsulted for possible left leg abscess.  Patient was found to have a palpable mass of the left calf on exam yesterday.  Ultrasound revealed a 3.7 x 2.4 x 4 cm heterogeneous structure possibly hematoma.  Patient has been afebrile.  Patient completed a course of cefepime this morning.  Patient states that he developed left calf pain and sensitivity a few days ago.  He denies any associated fevers or chills.  He says that his left calf is exquisitely tender to palpation.    Labs Reviewed, Medications Reviewed, and Radiology Reviewed.    Review of Systems:  Review of Systems   Constitutional:  Negative for chills and fever.   Respiratory:  Negative for cough, hemoptysis and shortness of breath.    Cardiovascular:  Negative for leg swelling.   Gastrointestinal:  Negative for abdominal pain, diarrhea, nausea and vomiting.   Neurological:  Negative for dizziness and headaches.   All other systems reviewed and are negative.    Hemodynamics:  Temp (24hrs), Av.9 °C (98.4 °F), Min:36.4 °C (97.6 °F), Max:37.2 °C (98.9 °F)  Temperature: 37 °C (98.6 °F)  Pulse  Av.6  Min: 60  Max: 179   Blood  Pressure: 123/79       Physical Exam:  Physical Exam  Vitals and nursing note reviewed.   Constitutional:       General: He is not in acute distress.     Appearance: He is ill-appearing. He is not toxic-appearing or diaphoretic.   HENT:      Nose: No rhinorrhea.      Mouth/Throat:      Pharynx: No oropharyngeal exudate or posterior oropharyngeal erythema.   Eyes:      General: No scleral icterus.     Extraocular Movements: Extraocular movements intact.      Pupils: Pupils are equal, round, and reactive to light.   Cardiovascular:      Rate and Rhythm: Tachycardia present.   Pulmonary:      Effort: Pulmonary effort is normal. No respiratory distress.      Breath sounds: No stridor.      Comments: Right upper chest port in place  Abdominal:      General: There is no distension.      Palpations: Abdomen is soft.      Tenderness: There is no abdominal tenderness.   Musculoskeletal:         General: Swelling present.      Cervical back: Neck supple. No rigidity.      Comments: Left calf very tender to palpation but no overlying edema or erythema   Skin:     Coloration: Skin is not jaundiced.   Neurological:      General: No focal deficit present.      Mental Status: He is alert and oriented to person, place, and time.   Psychiatric:         Mood and Affect: Mood normal.         Behavior: Behavior normal.      Comments: Pleasant       Meds:    Current Facility-Administered Medications:     HYDROcodone-acetaminophen    dextrose 5 % and 0.45 % NaCl with KCl 20 mEq    apixaban    imatinib    imatinib    sodium chloride    Pharmacy Consult Request    omeprazole    calcium carbonate    acetaminophen    ondansetron    Labs:  Recent Labs     01/12/23  0822   WBC 3.1*   RBC 2.73*   HEMOGLOBIN 7.8*   HEMATOCRIT 24.1*   MCV 88.3   MCH 28.6   RDW 51.7*   PLATELETCT 117*   MPV 10.6   NEUTSPOLYS 73.40*   LYMPHOCYTES 14.00*   MONOCYTES 10.40   EOSINOPHILS 0.00   BASOPHILS 0.30       Recent Labs     01/12/23  0822   SODIUM 139    POTASSIUM 3.8   CHLORIDE 105   CO2 23   GLUCOSE 124*   BUN 5*       Recent Labs     01/12/23  0822   ALBUMIN 3.2   TBILIRUBIN 0.3   ALKPHOSPHAT 89   TOTPROTEIN 5.5*   ALTSGPT 59*   ASTSGOT 31   CREATININE 0.49*         Imaging:  CT-ABDOMEN-PELVIS WITH    Result Date: 12/27/2022 12/27/2022 1:47 PM HISTORY/REASON FOR EXAM:  Sepsis. Nausea and vomiting. Abdominal pain chemotherapy for cancer. TECHNIQUE/EXAM DESCRIPTION:   CT scan of the abdomen and pelvis with contrast. Contrast-enhanced helical scanning was obtained from the diaphragmatic domes through the pubic symphysis following the bolus administration of nonionic contrast without complication. 100 mL of Omnipaque 350 nonionic contrast was administered without complication. Low dose optimization technique was utilized for this CT exam including automated exposure control and adjustment of the mA and/or kV according to patient size. COMPARISON: No prior studies available. FINDINGS: Lower Chest: Unremarkable. Liver: Hepatic fatty infiltration. Spleen: Unremarkable. Pancreas: Unremarkable. Gallbladder: No calcified stones. Biliary: Nondilated. Adrenal glands: Normal. Kidneys: Unremarkable without hydronephrosis. Bowel: No obstruction or acute inflammation. Normal appendix. Lymph nodes: No adenopathy. Vasculature: Unremarkable. Peritoneum: Unremarkable without ascites. Musculoskeletal: No acute or destructive process. Pelvis: No adenopathy or free fluid.     1.  Hepatic steatosis. 2.  No acute inflammatory abnormality within the abdomen or pelvis.    CT-HEAD W/O    Result Date: 12/27/2022 12/27/2022 1:42 PM HISTORY/REASON FOR EXAM:  Mental status change, unknown cause. Chemotherapy TECHNIQUE/EXAM DESCRIPTION AND NUMBER OF VIEWS: CT of the head without contrast. The study was performed on a helical multidetector CT scanner. Contiguous axial sections were obtained from the skull base through the vertex. Up to date radiation dose reduction adjustments have been  utilized to meet ALARA standards for radiation dose reduction. COMPARISON:  None available FINDINGS: The calvariae and skull base are unremarkable. There are no extraaxial fluid collections. The ventricular system and basal cisterns are within normal limits. There are no areas of abnormal density in the brain substance. There are no hemorrhagic lesions.  There are no mass effects or shift of midline structures. The brainstem and posterior fossa structures are unremarkable. Paranasal sinuses in the field of view are unremarkable. Mastoids in the field of view are unremarkable.     Head CT without contrast within normal limits. No evidence of acute cerebral infarction, hemorrhage or mass lesion.     CT-MAXILLOFACIAL W/O PLUS RECONS    Result Date: 12/27/2022 12/27/2022 1:42 PM HISTORY/REASON FOR EXAM:  Maxillofacial pain. Blood in the mouth. TECHNIQUE/EXAMD DESCRIPTION AND NUMBER OF VIEWS: CT scan maxillofacial without contrast, with reconstructions. Thin-section helical imaging was obtained of the maxillofacial structures from the orbital roofs through the mandible. Coronal and sagittal multiplanar volume reformat images were generated from the axial data. Low dose optimization technique was utilized for this CT exam including automated exposure control and adjustment of the mA and/or kV according to patient size. COMPARISON: None. FINDINGS: The maxillofacial and orbital bony structures are unremarkable. The paranasal sinuses are normally developed. There is a small retention cyst at the alveolar recess of the right maxillary sinus. There are no air-fluid levels. The mastoids and middle ear cavities are clear. There is no significant cervical adenopathy in the field-of-view.     1.  Essentially negative maxillofacial sinus CT. Small retention cyst in the alveolar recess of the right maxillary sinus of no clinical significance.    DX-CHEST-PORTABLE (1 VIEW)    Result Date: 12/29/2022 12/29/2022 1:08 AM  HISTORY/REASON FOR EXAM:  Shortness of Breath. TECHNIQUE/EXAM DESCRIPTION AND NUMBER OF VIEWS: Single portable view of the chest. COMPARISON: 12/28/2022 1:53 AM FINDINGS: Cardiomediastinal contour is unchanged. Previously described pulmonary parenchymal opacities persist. No pneumothorax identified. Orogastric tube has been removed. RIGHT chest infusion port remains.     1.  Supportive tubing as described above. 2.  No other significant change from prior exam.     DX-CHEST-PORTABLE (1 VIEW)    Result Date: 12/28/2022 12/28/2022 2:11 AM HISTORY/REASON FOR EXAM:  Shortness of Breath. TECHNIQUE/EXAM DESCRIPTION AND NUMBER OF VIEWS: Single portable view of the chest. COMPARISON: 12/27/2022 FINDINGS: Cardiomediastinal contour is unchanged. Previously described pulmonary parenchymal opacities persist. No pneumothorax identified. Supportive tubing is unchanged.     No significant change from prior exam.    DX-CHEST-PORTABLE (1 VIEW)    Result Date: 12/28/2022 12/27/2022 11:46 PM HISTORY/REASON FOR EXAM:  Shortness of Breath. TECHNIQUE/EXAM DESCRIPTION AND NUMBER OF VIEWS: Single portable view of the chest. COMPARISON: 12/27/2022 FINDINGS: Cardiac mediastinal contour is unchanged. Lungs show hypoinflation. Ill-defined bilateral perihilar opacities, new from prior exam. Pulmonary vessels are prominent. No pleural fluid collection or pneumothorax. Orogastric tube tip at the proximal stomach. RIGHT chest infusion port present.     Worsening hypoinflation and bilateral perihilar infiltrates as well as prominence of the pulmonary vasculature suggesting pulmonary edema.  Superimposed pneumonia is not excluded.    DX-CHEST-PORTABLE (1 VIEW)    Result Date: 12/27/2022 12/27/2022 2:09 PM HISTORY/REASON FOR EXAM: Chest pain TECHNIQUE/EXAM DESCRIPTION AND NUMBER OF VIEWS: Single portable view of the chest. COMPARISON: 8/29/2022 FINDINGS: There is a right subclavian Port-A-Cath. There is no evidence of focal consolidation or  evidence of pulmonary edema. There is no pleural effusion. The heart is normal in size.     No evidence of acute cardiopulmonary process.    US-EXTREMITY VENOUS LOWER BILAT    Result Date: 2022   Vascular Laboratory  CONCLUSIONS  No deep venous thrombosis.  Distal subcutaneous fat edema.  REBECA CHEEMA  Exam Date:     2022 17:13  Room #:     Inpatient  Priority:     Stat  Ht (in):             Wt (lb):  Ordering Physician:        SANDI IGLESIAS  Referring Physician:       [id number], MINOR  Sonographer:               Palma Churchill RVVIOLA  Study Type:                Complete Bilateral  Technical Quality:         Adequate  Age:    22    Gender:     M  MRN:    [id number]  :    2000      BSA:  Indications:     Localized swelling, mass and lump, lower limb, bilateral  CPT Codes:       62280  ICD Codes:       R22.43  History:         Swelling/edema of legs, concern for pulmonary embolus. Pain.                     No prior duplex.  Limitations:  PROCEDURES:  Bilateral lower extremity venous duplex imaging.  The following venous structures were evaluated: common femoral, deep  femoral, proximal great saphenous, femoral, popliteal, peroneal, and  posterior tibial veins.  Serial compression, color, and spectral Doppler flow evaluations were  performed.  FINDINGS:  Bilateral lower extremities.  No deep venous thrombosis.  All veins demonstrate complete color filling and compressibility with  normal venous flow dynamics including spontaneous flow and respiratory  phasicity.  Interstitial fluid consistent with edema is observed below the knee.  Dorian Knowles  (Electronically Signed)  Final Date:      2022                   20:46    EC-ECHOCARDIOGRAM LTD W/O CONT    Result Date: 2022  Transthoracic Echo Report Echocardiography Laboratory CONCLUSIONS The left ventricular ejection fraction is visually estimated to be 65%. Normal regional wall motion. REBECA CHEEMA Exam Date:          2022                    17:50 Exam Location:     Inpatient Priority:          Routine Ordering Physician:        SANDI IGLESIAS Referring Physician: Sonographer:               Tigist INTERIANO Age:    22     Gender:    M MRN:    [id number] :    2000 BSA:    1.82   Ht (in):    65     Wt (lb):    165 Exam Type:     Limited Indications:     Shortness of breath ICD Codes:       786.05 CPT Codes:       57831 BP:   128    /   57     HR:   160 Technical Quality:       Technically difficult study                          incomplete information is                          obtained MEASUREMENTS  (Male / Female) Normal Values 2D ECHO Estimated LV Ejection Fraction    65 %                  * Indicates values subject to auto-interpretation LV EF:  65    % FINDINGS Left Ventricle Normal left ventricular chamber size. Normal left ventricular systolic function. The left ventricular ejection fraction is visually estimated to be 65%. Normal regional wall motion. Diastolic function is difficult to assess with arrhythmia. Right Ventricle The right ventricle is not well visualized. Right Atrium The right atrium is not well visualized. Left Atrium The left atrium is not well visualized. Mitral Valve The mitral valve is not well visualized. Aortic Valve The aortic valve is not well visualized. Tricuspid Valve The tricuspid valve is not well visualized. Pulmonic Valve The pulmonic valve is not well visualized. Pericardium Normal pericardium without effusion. Aorta The ascending aorta is not well visualized. Mayito Howell MD (Electronically Signed) Final Date:     2022                 18:38      Micro:  Results       ** No results found for the last 168 hours. **            Pertinent diagnoses:  Left lower extremity fluid collection, concerning for abscess versus hematoma, new. Noted on ultrasound  Hemorrhagic + gram negative septic shock, resolved and status posttreatment  ALL  Pancytopenia  Profound  neutropenia, resolved  Port in place       PLAN:   -Recommend checking CT of the leg with contrast for better visualization of the fluid collection, concerning for abscess  -If there is any concern for abscess on imaging, recommend IR drainage.  Please send fluid for routine AFB, bacterial and fungal culture  -Okay to hold antibiotics at this time as patient hemodynamically stable without any fevers  -Blood cultures on 12/27 with Klebsiella pneumonia and E. Coli  -Repeat blood cultures on 12/30-negative  -Patient completed a course of cefepime for gram-negative bacteremia  -Port explantation was recommended however remains in place per oncology    Plan of care discussed with heme-onc, Dr. Faye

## 2023-01-13 NOTE — PROGRESS NOTES
Medical Social Work    SW received phone call from [facility] on 1/12/23 at 2:47pm asking for a return call.     SW attempted to return [facility] phone call, but there was no answer and SW unable to leave v/m due to it being full.     [facility] sent an email notifying [facility] to return call.    Plan: [facility] will attempt to contact [facility] again on 1/16/23.

## 2023-01-13 NOTE — CARE PLAN
The patient is Stable - Low risk of patient condition declining or worsening    Shift Goals  Clinical Goals: Monitor vital signs, prepare for discharge, administer antibiotics  Patient Goals: Go home  Family Goals: Not present.    Patient was updated on plan of care. CHG bath done with assistance. Patient educated on medications being administered throughout the shift. Patient was medicated per MAR for pain.    Progress made toward(s) clinical / shift goals:      Problem: Knowledge Deficit - Standard  Goal: Patient and family/care givers will demonstrate understanding of plan of care, disease process/condition, diagnostic tests and medications  Outcome: Progressing     Problem: Pain - Standard  Goal: Alleviation of pain or a reduction in pain to the patient’s comfort goal  Outcome: Progressing       Patient is not progressing towards the following goals:

## 2023-01-13 NOTE — PROGRESS NOTES
Monitor summary:   Sinus rhythm-sinus tachycardia   Up to 150-160 non-sustaining   0.14/0.10/0.34

## 2023-01-14 ENCOUNTER — PHARMACY VISIT (OUTPATIENT)
Dept: PHARMACY | Facility: MEDICAL CENTER | Age: 23
End: 2023-01-14
Payer: COMMERCIAL

## 2023-01-14 ENCOUNTER — APPOINTMENT (OUTPATIENT)
Dept: RADIOLOGY | Facility: MEDICAL CENTER | Age: 23
DRG: 871 | End: 2023-01-14
Attending: PEDIATRICS
Payer: COMMERCIAL

## 2023-01-14 VITALS
DIASTOLIC BLOOD PRESSURE: 71 MMHG | TEMPERATURE: 98.2 F | BODY MASS INDEX: 25.2 KG/M2 | RESPIRATION RATE: 18 BRPM | WEIGHT: 151.24 LBS | HEART RATE: 104 BPM | OXYGEN SATURATION: 98 % | HEIGHT: 65 IN | SYSTOLIC BLOOD PRESSURE: 129 MMHG

## 2023-01-14 PROBLEM — K92.2 UPPER GI BLEED: Status: RESOLVED | Noted: 2022-12-27 | Resolved: 2023-01-14

## 2023-01-14 PROBLEM — R78.81 GRAM-NEGATIVE BACTEREMIA: Status: RESOLVED | Noted: 2022-12-29 | Resolved: 2023-01-14

## 2023-01-14 PROBLEM — E87.6 HYPOKALEMIA: Status: RESOLVED | Noted: 2022-06-27 | Resolved: 2023-01-14

## 2023-01-14 PROBLEM — R73.9 HYPERGLYCEMIA: Status: RESOLVED | Noted: 2022-12-27 | Resolved: 2023-01-14

## 2023-01-14 PROBLEM — J96.01 ACUTE RESPIRATORY FAILURE WITH HYPOXIA (HCC): Status: RESOLVED | Noted: 2022-12-30 | Resolved: 2023-01-14

## 2023-01-14 PROBLEM — R79.89 ELEVATED TROPONIN: Status: RESOLVED | Noted: 2022-12-27 | Resolved: 2023-01-14

## 2023-01-14 PROCEDURE — 10160 PNXR ASPIR ABSC HMTMA BULLA: CPT

## 2023-01-14 PROCEDURE — 0J9P3ZZ DRAINAGE OF LEFT LOWER LEG SUBCUTANEOUS TISSUE AND FASCIA, PERCUTANEOUS APPROACH: ICD-10-PCS | Performed by: RADIOLOGY

## 2023-01-14 PROCEDURE — 4410013 IR-CONSULT AND TREAT

## 2023-01-14 PROCEDURE — 700111 HCHG RX REV CODE 636 W/ 250 OVERRIDE (IP): Performed by: STUDENT IN AN ORGANIZED HEALTH CARE EDUCATION/TRAINING PROGRAM

## 2023-01-14 PROCEDURE — 700102 HCHG RX REV CODE 250 W/ 637 OVERRIDE(OP): Performed by: HOSPITALIST

## 2023-01-14 PROCEDURE — 76942 ECHO GUIDE FOR BIOPSY: CPT

## 2023-01-14 PROCEDURE — 87205 SMEAR GRAM STAIN: CPT | Mod: 91

## 2023-01-14 PROCEDURE — 700102 HCHG RX REV CODE 250 W/ 637 OVERRIDE(OP): Performed by: PEDIATRICS

## 2023-01-14 PROCEDURE — 99233 SBSQ HOSP IP/OBS HIGH 50: CPT | Performed by: PEDIATRICS

## 2023-01-14 PROCEDURE — 87070 CULTURE OTHR SPECIMN AEROBIC: CPT

## 2023-01-14 PROCEDURE — 87015 SPECIMEN INFECT AGNT CONCNTJ: CPT

## 2023-01-14 PROCEDURE — A9270 NON-COVERED ITEM OR SERVICE: HCPCS | Performed by: HOSPITALIST

## 2023-01-14 PROCEDURE — 700111 HCHG RX REV CODE 636 W/ 250 OVERRIDE (IP): Performed by: PEDIATRICS

## 2023-01-14 PROCEDURE — 87206 SMEAR FLUORESCENT/ACID STAI: CPT

## 2023-01-14 PROCEDURE — 87116 MYCOBACTERIA CULTURE: CPT

## 2023-01-14 PROCEDURE — 99232 SBSQ HOSP IP/OBS MODERATE 35: CPT | Performed by: INTERNAL MEDICINE

## 2023-01-14 PROCEDURE — 87102 FUNGUS ISOLATION CULTURE: CPT

## 2023-01-14 RX ORDER — LIDOCAINE HYDROCHLORIDE 10 MG/ML
INJECTION, SOLUTION INFILTRATION; PERINEURAL
Status: DISCONTINUED
Start: 2023-01-14 | End: 2023-01-14 | Stop reason: HOSPADM

## 2023-01-14 RX ORDER — HYDROMORPHONE HYDROCHLORIDE 1 MG/ML
0.5 INJECTION, SOLUTION INTRAMUSCULAR; INTRAVENOUS; SUBCUTANEOUS ONCE
Status: COMPLETED | OUTPATIENT
Start: 2023-01-14 | End: 2023-01-14

## 2023-01-14 RX ORDER — HEPARIN SODIUM (PORCINE) LOCK FLUSH IV SOLN 100 UNIT/ML 100 UNIT/ML
500 SOLUTION INTRAVENOUS
Status: COMPLETED | OUTPATIENT
Start: 2023-01-14 | End: 2023-01-14

## 2023-01-14 RX ADMIN — OMEPRAZOLE 40 MG: 20 CAPSULE, DELAYED RELEASE ORAL at 05:57

## 2023-01-14 RX ADMIN — HYDROMORPHONE HYDROCHLORIDE 0.5 MG: 1 INJECTION, SOLUTION INTRAMUSCULAR; INTRAVENOUS; SUBCUTANEOUS at 12:15

## 2023-01-14 RX ADMIN — ALTEPLASE 2 MG: 2.2 INJECTION, POWDER, LYOPHILIZED, FOR SOLUTION INTRAVENOUS at 03:45

## 2023-01-14 RX ADMIN — HEPARIN 500 UNITS: 100 SYRINGE at 15:51

## 2023-01-14 RX ADMIN — CALCIUM CARBONATE 1000 MG: 500 TABLET, CHEWABLE ORAL at 05:57

## 2023-01-14 RX ADMIN — IMATINIB MESYLATE 400 MG: 400 TABLET, FILM COATED ORAL at 05:58

## 2023-01-14 ASSESSMENT — PAIN DESCRIPTION - PAIN TYPE: TYPE: ACUTE PAIN

## 2023-01-14 ASSESSMENT — ENCOUNTER SYMPTOMS
FEVER: 0
SHORTNESS OF BREATH: 0

## 2023-01-14 NOTE — PROGRESS NOTES
Infectious Disease Progress Note    Author: Nova Louis M.D. Date & Time of service: 2023  12:33 PM    Chief Complaint:  Follow-up for left calf fluid collection concerning for abscess  Gram negative sepsis  Neutropenia    Interval History:   22 y.o. 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 Found also to be in septic shock:blood cultures Ecoli and Kleb   AF (99.9) WBC 0.1 ANC 0 tolerating abx remains weak and edematous   AF (99.4) WBC 0.1 potassium 2.9 trop 155 platelets 9 sleeping soundly at time of rounds   AF WBC 0.2 alert today-feeling a little better-no further active bleeding No pressors   AF WBC 0.4 up to chair-weak and c/o BLE pain. Continued melena noted  1/3 AF WBC 1.1 ANC greater than 500 No new complaints Legs still hurt   patient afebrile, ID reconsulted for possible left leg abscess.  Patient was found to have a palpable mass of the left calf on exam yesterday.  Ultrasound revealed a 3.7 x 2.4 x 4 cm heterogeneous structure possibly hematoma.  Patient has been afebrile.  Patient completed a course of cefepime this morning.  Patient states that he developed left calf pain and sensitivity a few days ago.  He denies any associated fevers or chills.  He says that his left calf is exquisitely tender to palpation.   AF WBC 3.1 no new positive cxs CT reviewed neg for abscess. DW Dr Faye-he feels most likely hematoma given his prior profound thrombocytopenia, blood thinner, and presentation with CT neg for abscess    Labs Reviewed, Medications Reviewed, and Radiology Reviewed.    Review of Systems:  Review of Systems   Constitutional:  Negative for fever.   Respiratory:  Negative for shortness of breath.      Hemodynamics:  Temp (24hrs), Av.6 °C (97.8 °F), Min:36.3 °C (97.3 °F), Max:36.8 °C (98.2 °F)  Temperature: 36.8 °C (98.2 °F)  Pulse  Av.5  Min: 60  Max: 179   Blood Pressure: 129/71       Physical Exam:  Physical  Exam  Cardiovascular:      Rate and Rhythm: Tachycardia present.   Pulmonary:      Effort: Pulmonary effort is normal. No respiratory distress.       Meds:    Current Facility-Administered Medications:     lidocaine    HYDROcodone-acetaminophen    dextrose 5 % and 0.45 % NaCl with KCl 20 mEq    [Held by provider] apixaban    imatinib    imatinib    sodium chloride    Pharmacy Consult Request    omeprazole    calcium carbonate    acetaminophen    ondansetron    Labs:  Recent Labs     01/12/23  0822   WBC 3.1*   RBC 2.73*   HEMOGLOBIN 7.8*   HEMATOCRIT 24.1*   MCV 88.3   MCH 28.6   RDW 51.7*   PLATELETCT 117*   MPV 10.6   NEUTSPOLYS 73.40*   LYMPHOCYTES 14.00*   MONOCYTES 10.40   EOSINOPHILS 0.00   BASOPHILS 0.30       Recent Labs     01/12/23  0822   SODIUM 139   POTASSIUM 3.8   CHLORIDE 105   CO2 23   GLUCOSE 124*   BUN 5*       Recent Labs     01/12/23  0822   ALBUMIN 3.2   TBILIRUBIN 0.3   ALKPHOSPHAT 89   TOTPROTEIN 5.5*   ALTSGPT 59*   ASTSGOT 31   CREATININE 0.49*         Imaging:  CT-ABDOMEN-PELVIS WITH    Result Date: 12/27/2022 12/27/2022 1:47 PM HISTORY/REASON FOR EXAM:  Sepsis. Nausea and vomiting. Abdominal pain chemotherapy for cancer. TECHNIQUE/EXAM DESCRIPTION:   CT scan of the abdomen and pelvis with contrast. Contrast-enhanced helical scanning was obtained from the diaphragmatic domes through the pubic symphysis following the bolus administration of nonionic contrast without complication. 100 mL of Omnipaque 350 nonionic contrast was administered without complication. Low dose optimization technique was utilized for this CT exam including automated exposure control and adjustment of the mA and/or kV according to patient size. COMPARISON: No prior studies available. FINDINGS: Lower Chest: Unremarkable. Liver: Hepatic fatty infiltration. Spleen: Unremarkable. Pancreas: Unremarkable. Gallbladder: No calcified stones. Biliary: Nondilated. Adrenal glands: Normal. Kidneys: Unremarkable without  hydronephrosis. Bowel: No obstruction or acute inflammation. Normal appendix. Lymph nodes: No adenopathy. Vasculature: Unremarkable. Peritoneum: Unremarkable without ascites. Musculoskeletal: No acute or destructive process. Pelvis: No adenopathy or free fluid.     1.  Hepatic steatosis. 2.  No acute inflammatory abnormality within the abdomen or pelvis.    CT-HEAD W/O    Result Date: 12/27/2022 12/27/2022 1:42 PM HISTORY/REASON FOR EXAM:  Mental status change, unknown cause. Chemotherapy TECHNIQUE/EXAM DESCRIPTION AND NUMBER OF VIEWS: CT of the head without contrast. The study was performed on a helical multidetector CT scanner. Contiguous axial sections were obtained from the skull base through the vertex. Up to date radiation dose reduction adjustments have been utilized to meet ALARA standards for radiation dose reduction. COMPARISON:  None available FINDINGS: The calvariae and skull base are unremarkable. There are no extraaxial fluid collections. The ventricular system and basal cisterns are within normal limits. There are no areas of abnormal density in the brain substance. There are no hemorrhagic lesions.  There are no mass effects or shift of midline structures. The brainstem and posterior fossa structures are unremarkable. Paranasal sinuses in the field of view are unremarkable. Mastoids in the field of view are unremarkable.     Head CT without contrast within normal limits. No evidence of acute cerebral infarction, hemorrhage or mass lesion.     CT-MAXILLOFACIAL W/O PLUS RECONS    Result Date: 12/27/2022 12/27/2022 1:42 PM HISTORY/REASON FOR EXAM:  Maxillofacial pain. Blood in the mouth. TECHNIQUE/EXAMD DESCRIPTION AND NUMBER OF VIEWS: CT scan maxillofacial without contrast, with reconstructions. Thin-section helical imaging was obtained of the maxillofacial structures from the orbital roofs through the mandible. Coronal and sagittal multiplanar volume reformat images were generated from the axial  data. Low dose optimization technique was utilized for this CT exam including automated exposure control and adjustment of the mA and/or kV according to patient size. COMPARISON: None. FINDINGS: The maxillofacial and orbital bony structures are unremarkable. The paranasal sinuses are normally developed. There is a small retention cyst at the alveolar recess of the right maxillary sinus. There are no air-fluid levels. The mastoids and middle ear cavities are clear. There is no significant cervical adenopathy in the field-of-view.     1.  Essentially negative maxillofacial sinus CT. Small retention cyst in the alveolar recess of the right maxillary sinus of no clinical significance.    DX-CHEST-PORTABLE (1 VIEW)    Result Date: 12/29/2022 12/29/2022 1:08 AM HISTORY/REASON FOR EXAM:  Shortness of Breath. TECHNIQUE/EXAM DESCRIPTION AND NUMBER OF VIEWS: Single portable view of the chest. COMPARISON: 12/28/2022 1:53 AM FINDINGS: Cardiomediastinal contour is unchanged. Previously described pulmonary parenchymal opacities persist. No pneumothorax identified. Orogastric tube has been removed. RIGHT chest infusion port remains.     1.  Supportive tubing as described above. 2.  No other significant change from prior exam.     DX-CHEST-PORTABLE (1 VIEW)    Result Date: 12/28/2022 12/28/2022 2:11 AM HISTORY/REASON FOR EXAM:  Shortness of Breath. TECHNIQUE/EXAM DESCRIPTION AND NUMBER OF VIEWS: Single portable view of the chest. COMPARISON: 12/27/2022 FINDINGS: Cardiomediastinal contour is unchanged. Previously described pulmonary parenchymal opacities persist. No pneumothorax identified. Supportive tubing is unchanged.     No significant change from prior exam.    DX-CHEST-PORTABLE (1 VIEW)    Result Date: 12/28/2022 12/27/2022 11:46 PM HISTORY/REASON FOR EXAM:  Shortness of Breath. TECHNIQUE/EXAM DESCRIPTION AND NUMBER OF VIEWS: Single portable view of the chest. COMPARISON: 12/27/2022 FINDINGS: Cardiac mediastinal contour is  unchanged. Lungs show hypoinflation. Ill-defined bilateral perihilar opacities, new from prior exam. Pulmonary vessels are prominent. No pleural fluid collection or pneumothorax. Orogastric tube tip at the proximal stomach. RIGHT chest infusion port present.     Worsening hypoinflation and bilateral perihilar infiltrates as well as prominence of the pulmonary vasculature suggesting pulmonary edema.  Superimposed pneumonia is not excluded.    DX-CHEST-PORTABLE (1 VIEW)    Result Date: 2022 2:09 PM HISTORY/REASON FOR EXAM: Chest pain TECHNIQUE/EXAM DESCRIPTION AND NUMBER OF VIEWS: Single portable view of the chest. COMPARISON: 2022 FINDINGS: There is a right subclavian Port-A-Cath. There is no evidence of focal consolidation or evidence of pulmonary edema. There is no pleural effusion. The heart is normal in size.     No evidence of acute cardiopulmonary process.    US-EXTREMITY VENOUS LOWER BILAT    Result Date: 2022   Vascular Laboratory  CONCLUSIONS  No deep venous thrombosis.  Distal subcutaneous fat edema.  REBECA CHEEMA  Exam Date:     2022 17:13  Room #:     Inpatient  Priority:     Stat  Ht (in):             Wt (lb):  Ordering Physician:        SANDI IGLESIAS  Referring Physician:       [id number]  Sonographer:               Palma Churchill RVT  Study Type:                Complete Bilateral  Technical Quality:         Adequate  Age:    22    Gender:     M  MRN:    [id number]  :    2000      BSA:  Indications:     Localized swelling, mass and lump, lower limb, bilateral  CPT Codes:       15205  ICD Codes:       R22.43  History:         Swelling/edema of legs, concern for pulmonary embolus. Pain.                     No prior duplex.  Limitations:  PROCEDURES:  Bilateral lower extremity venous duplex imaging.  The following venous structures were evaluated: common femoral, deep  femoral, proximal great saphenous, femoral, popliteal, peroneal, and  posterior  tibial veins.  Serial compression, color, and spectral Doppler flow evaluations were  performed.  FINDINGS:  Bilateral lower extremities.  No deep venous thrombosis.  All veins demonstrate complete color filling and compressibility with  normal venous flow dynamics including spontaneous flow and respiratory  phasicity.  Interstitial fluid consistent with edema is observed below the knee.  Dorian Knowles  (Electronically Signed)  Final Date:      2022                   20:46    EC-ECHOCARDIOGRAM LTD W/O CONT    Result Date: 2022  Transthoracic Echo Report Echocardiography Laboratory CONCLUSIONS The left ventricular ejection fraction is visually estimated to be 65%. Normal regional wall motion. REBECA CHEEMA Exam Date:         2022                    17:50 Exam Location:     Inpatient Priority:          Routine Ordering Physician:        SANDI IGLESIAS Referring Physician: Sonographer:               Tigist INTERIANO Age:    22     Gender:    M MRN:    [id number] :    2000 BSA:    1.82   Ht (in):    65     Wt (lb):    165 Exam Type:     Limited Indications:     Shortness of breath ICD Codes:       786.05 CPT Codes:       46859 BP:   128    /   57     HR:   160 Technical Quality:       Technically difficult study                          incomplete information is                          obtained MEASUREMENTS  (Male / Female) Normal Values 2D ECHO Estimated LV Ejection Fraction    65 %                  * Indicates values subject to auto-interpretation LV EF:  65    % FINDINGS Left Ventricle Normal left ventricular chamber size. Normal left ventricular systolic function. The left ventricular ejection fraction is visually estimated to be 65%. Normal regional wall motion. Diastolic function is difficult to assess with arrhythmia. Right Ventricle The right ventricle is not well visualized. Right Atrium The right atrium is not well visualized. Left Atrium The left atrium is not  well visualized. Mitral Valve The mitral valve is not well visualized. Aortic Valve The aortic valve is not well visualized. Tricuspid Valve The tricuspid valve is not well visualized. Pulmonic Valve The pulmonic valve is not well visualized. Pericardium Normal pericardium without effusion. Aorta The ascending aorta is not well visualized. Mayito Howell MD (Electronically Signed) Final Date:     27 December 2022                 18:38      Micro:  Results       ** No results found for the last 168 hours. **            Pertinent diagnoses:  Left lower extremity fluid collection, concerning for abscess versus hematoma, new. Noted on ultrasound  Hemorrhagic + gram negative septic shock, resolved and status posttreatment  ALL  Pancytopenia  Profound neutropenia, resolved  Port in place       PLAN:   -CT of the leg with contrast negative for abscess  -Okay to hold antibiotics at this time as he is now hemodynamically stable without any fevers  -Blood cultures on 12/27 with Klebsiella pneumonia and E. Coli  -Repeat blood cultures on 12/30-negative  -Patient completed a course of cefepime for gram-negative bacteremia  -Port explantation was recommended however remains in place per oncology  -Plan is for dc home today  Will sign off  FU ID [id number]      Plan of care discussed with peds heme-onc, Dr. Faye

## 2023-01-14 NOTE — CARE PLAN
The patient is Stable - Low risk of patient condition declining or worsening    Shift Goals  Clinical Goals: ct's orderd, mobilization/PT, chemo precautions, plan for home  Patient Goals: mobilization, chemo pills after shower and eating tonight  Family Goals: not here    Progress made toward(s) clinical / shift goals:  yes        Problem: Knowledge Deficit - Standard  Goal: Patient and family/care givers will demonstrate understanding of plan of care, disease process/condition, diagnostic tests and medications  Outcome: Progressing     Problem: Pain - Standard  Goal: Alleviation of pain or a reduction in pain to the patient’s comfort goal  Outcome: Progressing     Problem: Skin Integrity  Goal: Skin integrity is maintained or improved  Outcome: Progressing     Problem: Fall Risk  Goal: Patient will remain free from falls  Outcome: Progressing     Problem: Nutrition  Goal: Patient's nutritional and fluid intake will be adequate or improve  Outcome: Progressing     Problem: Urinary Elimination  Goal: Establish and maintain regular urinary output  Outcome: Progressing     Problem: Mobility - PT up and working with PT today.  Goal: Patient's capacity to carry out activities will improve  Outcome: Progressing     Problem: Self Care  Goal: Patient will have the ability to perform ADLs independently or with assistance (bathe, groom, dress, toilet and feed)  Outcome: Progressing     Problem: Infection - Standard  Goal: Patient will remain free from infection  Outcome: Progressing

## 2023-01-14 NOTE — PROGRESS NOTES
Pediatric Hematology/Oncology  Daily Progress Note      Patient Name:  Tomas Jean-Baptiste  : 2000  MRN: [id number]    Location of Service:  [facility]  Date of Service: 2023  Time: 9:00 AM    Hospital Day: 18    Protocol / Treatment Plan:  [place] Chromosome Precursor B-Cell Acute Lymphoblastic Leukemia, ON STUDY [id number], Delayed Intensification, Day 39 (Day 29 DELAYED/HELD)     SUBJECTIVE:     No acute events overnight.  Afebrile with T-max 98.9 °F.  This morning, JULY reports that he is feeling quite well with good energy and ready to go for the day.  He does report that he still has a small amount of pain in his legs however this has improved considerably from even yesterday.  Additional attempts to get up and move were made.  JULY states that he was trying to conserve his energy however for physical therapy.  Physical therapy never did come yesterday.  No complaints of any headaches, changes in vision or neurologic status changes.  JULY denies any nausea, vomiting, diarrhea or constipation.  No abdominal discomfort or pain.  No issues with coughing up blood and no blood in his stool.  JULY denies any cough, shortness of breath or difficulty with breathing.  No complaints of any skin changes or rashes.  No other concerns or complaints at this time.    Review of Systems:     Constitutional: Afebrile, continues to feel better with each day.  Good appetite and good oral intake.  HENT: Negative for auditory changes or ear pain, no nosebleeds, no sore throat.  No mouth sores.  Eyes: Negative for visual changes.  Respiratory: Negative for shortness of breath.  Cardiovascular: Negative.  Gastrointestinal: Negative for nausea, vomiting, abdominal pain, diarrhea, constipation.  Genitourinary: Negative.  Musculoskeletal: Still with left-sided leg pain  Skin: Negative for rash or skin infection.  Neurological: Negative for numbness, tingling, sensory changes, weakness or  "headaches.    Endo/Heme/Allergies: No bruising/bleeding easily.    Psychiatric/Behavioral: Good mood.     OBJECTIVE:     Max Temp: Temp (24hrs), Av.7 °C (98 °F), Min:36.3 °C (97.3 °F), Max:37.2 °C (98.9 °F)    Vitals: /84   Pulse (!) 106   Temp 36.3 °C (97.3 °F) (Temporal)   Resp 16   Ht 1.651 m (5' 5\")   Wt 68.6 kg (151 lb 3.8 oz)   SpO2 98%   BMI 25.17 kg/m²     I/O:   Intake/Output Summary (Last 24 hours) at 2023 0008  Last data filed at 2023 1900  Gross per 24 hour   Intake 2154.08 ml   Output 1900 ml   Net 254.08 ml       Labs:    Most Recent Hematology Labs:    Lab Results   Component Value Date/Time    WBC 3.1 (L) 2023 0822    HEMOGLOBIN 7.8 (L) 2023 0822    MCV 88.3 2023 0822    PLATELETCT 117 (L) 2023 0822    NEUTS 2.26 2023 0822       Most Recent Metabolic Panel:    Lab Results   Component Value Date/Time    POTASSIUM 3.8 2023 0822    CHLORIDE 105 2023 0822    CO2 23 2023 0822    GLUCOSE 124 (H) 2023 0822    BUN 5 (L) 2023 0822    CREATININE 0.49 (L) 2023 0822    CALCIUM 8.5 2023 0822    ANION 11.0 2023 0822     Physical Exam:    Constitutional: Continues to improve physically with every day.  Appears to have more strength.  HENT: Normocephalic and atraumatic. Alopecia.  No rhinorrhea. Oropharynx is clear and moist.  Chapped lips improved.  Eyes: Conjunctivae are normal. EOMI. nonicteric.  Neck: Normal range of motion of neck, no adenopathy.    Cardiovascular: Normal rate, regular rhythm.  No murmur. DP/radial pulses 2+, cap refill < 2 sec.  Pulmonary/Chest: Effort normal. No respiratory distress. Symmetric expansion.  No crackles or wheezes.  Abdomen: Soft. Bowel sounds are normal. No distension and no mass. There is no hepatosplenomegaly.    Genitourinary:  Deferred.  Musculoskeletal: Normal range of motion of lower and upper extremities bilaterally.  Still with exquisite point tenderness of the left " lateral.  Palpable 5 cm mass at site of pain   Neurological: Alert and oriented to person and place. Exhibits normal muscle tone bilaterally in upper and lower extremities. Gait not assessed today.    Skin: Skin is warm, dry and pink.  No rash or evidence of skin infection.   Psychiatric: Mood is good.    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 coli and Klebsiella   - Both organisms susceptible to cefepime  - First repeat blood culture obtained 12/30/2022 negative  - S/P pressor support and stress dose hydrocortisone  - Day 14 of cefepime from first (obtained) negative culture               - Did not remove port-a-cath               - Infectious Disease okay to discontinue antibiotics however will continue antibiotics until evaluation of possible abscess complete     2) Ph+ Precursor B-Cell Acute Lymphoblastic Leukemia, in MRD Remission:              - 2-3 weeks of symptoms              - Presenting WBC > 440 k/uL, hyperleukocytosis              - Start of Hydroxyurea (1500 mg PO Q8) 2320 on 5/27/2022  - discontinued after only 55 hours              - No steroid pretreatment              - 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              - Testicular status NEGATIVE                   - Flow cytometry of both 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, FISH positive for BCR-ABL1 translocation              - Enrolled on [employer] [id number]              - Initially enrolled on [employer] [id number] - but taken off study due to  Ph+ ALL status                            - Enrolled on [facility] [id number] and began study 6/13/2022              - Started imatinib therapy 6/3/2022 (split dosing of imatinib 400 mg PO QAM and 200 mg PO QPM) - continued at Day 15 of Induction 1A with imatinib 600 mg PO daily               - No changes needed in imatinib dosing to through first half of Delayed Intensification                            - End of Induction IA and IB MRD negative                         - Imatinib held for Septic Shock 12/27/2022-1/8/2023             - Imatinib 600 mg PO daily restarted 1/8/2023     [id number], AR Arm B, Delayed Intensification, Day 39 (Day 29 therapy being HELD due to Septic Shock):      - Sepsis now resolved   - Full recovery of counts   - Still lingering question of possible abscess and subsequent management of   - If abscess manageable, plan to restart chemotherapy, Delayed Intensification, Day 29 therapy next week                 2) Chemotherapy and Sepsis Related Pancytopenia:              -No new labs today  -Transfused yesterday with PRBC  - Transfuse for hemoglobin less than 7  - Transfuse for platelets less than 10         3) Sixth Cranial Nerve Palsy (IMPROVED/RESOLVED):             - Followed by Dr. Carranza     4) At Risk of Opportunistic Lung Infection:             - Bactrim DS PO BID Sat and Sun to resume this weekend     5) Leg Pain:           - Continues to have moderate leg pain with ambulation, reports asymmetry with left worse than right           - Able to ambulate very short distances but does complain of pain   - Concern for abscess as below               - Hydrocodone 5 mg/325 mg PO Q4PRN mild pain -using sparingly     6) Left Leg Mass:           - Asymmetric leg pain with left greater than right   - Palpable 5 cm mass over the left lateral middle calf  - Ultrasound read as probable hematoma   - Given clinical history however personal concern for abscess  - Infectious Disease consultation today  - CT  scanning of Lower extremity concerning for abscess  - Referral placed to IR for evaluation and treatment of possible abscess, will ask for them to drain or sample possible abscess and sent for culture      7) Social:             - Continue with support             - Continue school as tolerated     8) Access:             - R Port-A-Cath in place      9) At Risk for Deep Venous Thrombosis:           - Restarted apixaban 2.5 mg PO BID given a lack of consistent ambulation   -We will hold this evening in anticipation of possible IR procedure tomorrow for draining of abscess     10) Research Participant:           [facility] - Source Data         Diagnosis: Ph+ Precursor B-Cell Acute Lymphoblastic Leukemia     Disease Status: EOI1A MRD NEGATIVE, EOI1B RD NEGATIVE, CNS3c, testicular negative, HSV1 IgG POSITIVE, CMV IgG NEGATIVE, VARICELLA IgG POSITIVE     Active Studies: [id number], [id number]                                                                                                      Inactive Studies: [id number]                                                                                                                                                Optional Studies: None             Protocol: International Phase 3 trial in [place] 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      Treatment Plan: [id number](OS), AR-Arm B, Delayed Intensification, Day 39     Height: 1.650 m      Weight: 75.5 kg       BSA: 1.86 m²   (Start of Delayed Intensification 12/6/2022)                                                                                                                                           Performance Status: Karnofsky 70, ECOG  3 (1/13/2023)     Treatment Plan Medications:       Currently taking imatinib 600 mg PO daily (Started 1/8/2023)      Evaluations / Study Labs:  (1/13/2023)     None  required     Therapy Given: (1/13/2023)     None    Plan to start Day 29 of Delayed Intensification next week         Disposition: Discharge planning underway.  Inpatient for additional recovery.         Pepe Faye MD  Pediatric Hematology / Oncology  [facility]  Cell.  [phone]  Office. [phone]

## 2023-01-14 NOTE — CARE PLAN
The patient is Watcher - Medium risk of patient condition declining or worsening    Shift Goals  Clinical Goals: Mobilize patient, Prepare for discharge  Patient Goals: Walk around, go home, rest  Family Goals: not here    Patient and family member updated on plan of care with MD and RN at bedside via telephone. Patient understands plan of care. Patient medicated per MAR for pain. Patient ambulated to the bathroom with a walker. Discussed NPO status for possible IR procedure tomorrow.     Progress made toward(s) clinical / shift goals:      Problem: Knowledge Deficit - Standard  Goal: Patient and family/care givers will demonstrate understanding of plan of care, disease process/condition, diagnostic tests and medications  Outcome: Progressing     Problem: Pain - Standard  Goal: Alleviation of pain or a reduction in pain to the patient’s comfort goal  Outcome: Progressing       Patient is not progressing towards the following goals:

## 2023-01-15 DIAGNOSIS — C91.00 PHILADELPHIA CHROMOSOME POSITIVE ACUTE LYMPHOBLASTIC LEUKEMIA (ALL) (HCC): ICD-10-CM

## 2023-01-15 LAB
FUNGUS SPEC FUNGUS STN: NORMAL
GRAM STN SPEC: NORMAL
RHODAMINE-AURAMINE STN SPEC: NORMAL
SIGNIFICANT IND 70042: NORMAL
SITE SITE: NORMAL
SOURCE SOURCE: NORMAL

## 2023-01-15 RX ORDER — IMATINIB MESYLATE 100 MG/1
TABLET, FILM COATED ORAL
Qty: 90 TABLET | Refills: 5 | Status: ON HOLD | OUTPATIENT
Start: 2023-01-15 | End: 2023-02-07

## 2023-01-15 RX ORDER — IMATINIB MESYLATE 400 MG/1
TABLET, FILM COATED ORAL
Qty: 30 TABLET | Refills: 5 | Status: ON HOLD | OUTPATIENT
Start: 2023-01-15 | End: 2023-02-07

## 2023-01-16 NOTE — PROGRESS NOTES
"Pharmacy Chemotherapy Verification Note:    Dx: Ph+ B-ALL        Protocol: Delayed Intensification Part 2 per [id number] (On Study Arm B, ID number: [id number])         Allergies:  Amoxicillin     BP (!) 122/93   Pulse 96   Temp 37.6 °C (99.6 °F) (Temporal)   Resp 18   Ht 1.65 m (5' 4.96\")   Wt 64.2 kg (141 lb 8.6 oz)   SpO2 100%   BMI 23.58 kg/m²  Body surface area is 1.72 meters squared.  Weight Type Height Weight BSA   Treatment plan 165 cm 75.5 kg 1.86 m²     Labs from 1/17/23  reviewed - all within treatment parameters.      Drug Order   (Drug name, dose, route, IV Fluid & volume, frequency, number of doses) Cycle: Delayed Intensification Day 29  *delayed for infection/hospitalization  Previous treatment: DI D15 12/20/22   Medication = cytarabine  Base Dose = 75mg/m2mg/m2  Calc Dose: Base Dose x 1.72m2 = 129mg  Final Dose = 130mg  Route = IV  Fluid & Volume = in syringe(20mg/mL) 6.5mL  Admin Duration = Over 5 min   Days 29-32 and 36-39    <10% difference, ok to treat with final dose   Medication = cyclophosphamide  Base Dose = 1000mg/m2  Calc Dose:Base Dose x 1.72m2 = 1720mg  Final Dose = 1860mg  Route = IV  Fluid & Volume = NS 250mL  Admin Duration = Over 30min   Day 29      <10% difference, okay to treat with final dose   Medication = methotrexate  Base Dose = 12mg fixed dose  Calc Dose: n/a  Final Dose = 12mg  Route = INTRATHECAL  Fluid & Volume = PF NS 6 mL  Admin Duration = IT per MD   Day 29 and 36  Day 29 to be given on day 30      <10% difference, okay to treat with final dose     By my signature below, I confirm this process was performed independently with the BSA and all final chemotherapy dosing calculations congruent. I have reviewed the above chemotherapy order and that my calculation of the final dose and BSA (when applicable) corroborate those calculations of the  pharmacist.     JAE Wilson, Pharm.D.    "

## 2023-01-17 ENCOUNTER — HOSPITAL ENCOUNTER (OUTPATIENT)
Dept: INFUSION CENTER | Facility: MEDICAL CENTER | Age: 23
End: 2023-01-17
Attending: PEDIATRICS
Payer: COMMERCIAL

## 2023-01-17 VITALS
DIASTOLIC BLOOD PRESSURE: 73 MMHG | HEIGHT: 65 IN | RESPIRATION RATE: 18 BRPM | WEIGHT: 141.54 LBS | SYSTOLIC BLOOD PRESSURE: 124 MMHG | HEART RATE: 89 BPM | OXYGEN SATURATION: 98 % | TEMPERATURE: 99.9 F | BODY MASS INDEX: 23.58 KG/M2

## 2023-01-17 DIAGNOSIS — Z51.11 ENCOUNTER FOR CHEMOTHERAPY MANAGEMENT: ICD-10-CM

## 2023-01-17 DIAGNOSIS — C91.01 ACUTE LYMPHOBLASTIC LEUKEMIA (ALL) IN REMISSION (HCC): ICD-10-CM

## 2023-01-17 DIAGNOSIS — C91.Z0 B LYMPHOBLASTIC LEUKEMIA WITH T(9;22)(Q34;Q11.2);BCR-ABL1 (HCC): ICD-10-CM

## 2023-01-17 LAB
ALBUMIN SERPL BCP-MCNC: 3.5 G/DL (ref 3.2–4.9)
ALBUMIN/GLOB SERPL: 1.4 G/DL
ALP SERPL-CCNC: 91 U/L (ref 30–99)
ALT SERPL-CCNC: 27 U/L (ref 2–50)
ANION GAP SERPL CALC-SCNC: 10 MMOL/L (ref 7–16)
APPEARANCE UR: CLEAR
APPEARANCE UR: CLEAR
AST SERPL-CCNC: 19 U/L (ref 12–45)
BACTERIA #/AREA URNS HPF: NEGATIVE /HPF
BACTERIA FLD AEROBE CULT: NORMAL
BASOPHILS # BLD AUTO: 0.3 % (ref 0–1.8)
BASOPHILS # BLD: 0.01 K/UL (ref 0–0.12)
BILIRUB SERPL-MCNC: 0.4 MG/DL (ref 0.1–1.5)
BILIRUB UR QL STRIP.AUTO: NEGATIVE
BILIRUB UR QL STRIP.AUTO: NEGATIVE
BUN SERPL-MCNC: 8 MG/DL (ref 8–22)
CALCIUM ALBUM COR SERPL-MCNC: 8.9 MG/DL (ref 8.5–10.5)
CALCIUM SERPL-MCNC: 8.5 MG/DL (ref 8.5–10.5)
CAOX CRY #/AREA URNS HPF: ABNORMAL /HPF
CHLORIDE SERPL-SCNC: 106 MMOL/L (ref 96–112)
CO2 SERPL-SCNC: 22 MMOL/L (ref 20–33)
COLOR UR: YELLOW
COLOR UR: YELLOW
CREAT SERPL-MCNC: 0.77 MG/DL (ref 0.5–1.4)
EOSINOPHIL # BLD AUTO: 0 K/UL (ref 0–0.51)
EOSINOPHIL NFR BLD: 0 % (ref 0–6.9)
EPI CELLS #/AREA URNS HPF: NEGATIVE /HPF
ERYTHROCYTE [DISTWIDTH] IN BLOOD BY AUTOMATED COUNT: 56.3 FL (ref 35.9–50)
GFR SERPLBLD CREATININE-BSD FMLA CKD-EPI: 130 ML/MIN/1.73 M 2
GLOBULIN SER CALC-MCNC: 2.5 G/DL (ref 1.9–3.5)
GLUCOSE SERPL-MCNC: 84 MG/DL (ref 65–99)
GLUCOSE UR STRIP.AUTO-MCNC: NEGATIVE MG/DL
GLUCOSE UR STRIP.AUTO-MCNC: NEGATIVE MG/DL
GRAM STN SPEC: NORMAL
HCT VFR BLD AUTO: 29 % (ref 42–52)
HGB BLD-MCNC: 9.2 G/DL (ref 14–18)
HYALINE CASTS #/AREA URNS LPF: ABNORMAL /LPF
IMM GRANULOCYTES # BLD AUTO: 0.02 K/UL (ref 0–0.11)
IMM GRANULOCYTES NFR BLD AUTO: 0.6 % (ref 0–0.9)
KETONES UR STRIP.AUTO-MCNC: ABNORMAL MG/DL
KETONES UR STRIP.AUTO-MCNC: NEGATIVE MG/DL
LEUKOCYTE ESTERASE UR QL STRIP.AUTO: NEGATIVE
LEUKOCYTE ESTERASE UR QL STRIP.AUTO: NEGATIVE
LYMPHOCYTES # BLD AUTO: 0.81 K/UL (ref 1–4.8)
LYMPHOCYTES NFR BLD: 24.5 % (ref 22–41)
MCH RBC QN AUTO: 28.7 PG (ref 27–33)
MCHC RBC AUTO-ENTMCNC: 31.7 G/DL (ref 33.7–35.3)
MCV RBC AUTO: 90.3 FL (ref 81.4–97.8)
MICRO URNS: ABNORMAL
MICRO URNS: NORMAL
MONOCYTES # BLD AUTO: 0.45 K/UL (ref 0–0.85)
MONOCYTES NFR BLD AUTO: 13.6 % (ref 0–13.4)
MUCOUS THREADS #/AREA URNS HPF: ABNORMAL /HPF
NEUTROPHILS # BLD AUTO: 2.02 K/UL (ref 1.82–7.42)
NEUTROPHILS NFR BLD: 61 % (ref 44–72)
NITRITE UR QL STRIP.AUTO: NEGATIVE
NITRITE UR QL STRIP.AUTO: NEGATIVE
NRBC # BLD AUTO: 0.04 K/UL
NRBC BLD-RTO: 1.2 /100 WBC
PH UR STRIP.AUTO: 6 [PH] (ref 5–8)
PH UR STRIP.AUTO: 6 [PH] (ref 5–8)
PLATELET # BLD AUTO: 158 K/UL (ref 164–446)
PMV BLD AUTO: 9.5 FL (ref 9–12.9)
POTASSIUM SERPL-SCNC: 3.9 MMOL/L (ref 3.6–5.5)
PROT SERPL-MCNC: 6 G/DL (ref 6–8.2)
PROT UR QL STRIP: 30 MG/DL
PROT UR QL STRIP: NEGATIVE MG/DL
RBC # BLD AUTO: 3.21 M/UL (ref 4.7–6.1)
RBC # URNS HPF: ABNORMAL /HPF
RBC UR QL AUTO: NEGATIVE
RBC UR QL AUTO: NEGATIVE
SIGNIFICANT IND 70042: NORMAL
SITE SITE: NORMAL
SODIUM SERPL-SCNC: 138 MMOL/L (ref 135–145)
SOURCE SOURCE: NORMAL
SP GR UR STRIP.AUTO: 1.01
SP GR UR STRIP.AUTO: 1.02
UROBILINOGEN UR STRIP.AUTO-MCNC: 0.2 MG/DL
UROBILINOGEN UR STRIP.AUTO-MCNC: 1 MG/DL
WBC # BLD AUTO: 3.3 K/UL (ref 4.8–10.8)
WBC #/AREA URNS HPF: ABNORMAL /HPF

## 2023-01-17 PROCEDURE — 99214 OFFICE O/P EST MOD 30 MIN: CPT | Performed by: PEDIATRICS

## 2023-01-17 PROCEDURE — 36591 DRAW BLOOD OFF VENOUS DEVICE: CPT

## 2023-01-17 PROCEDURE — 80053 COMPREHEN METABOLIC PANEL: CPT

## 2023-01-17 PROCEDURE — 96375 TX/PRO/DX INJ NEW DRUG ADDON: CPT

## 2023-01-17 PROCEDURE — 96361 HYDRATE IV INFUSION ADD-ON: CPT

## 2023-01-17 PROCEDURE — 96413 CHEMO IV INFUSION 1 HR: CPT

## 2023-01-17 PROCEDURE — 700105 HCHG RX REV CODE 258: Performed by: PEDIATRICS

## 2023-01-17 PROCEDURE — 700102 HCHG RX REV CODE 250 W/ 637 OVERRIDE(OP): Performed by: PEDIATRICS

## 2023-01-17 PROCEDURE — 85025 COMPLETE CBC W/AUTO DIFF WBC: CPT

## 2023-01-17 PROCEDURE — 81001 URINALYSIS AUTO W/SCOPE: CPT

## 2023-01-17 PROCEDURE — 96411 CHEMO IV PUSH ADDL DRUG: CPT

## 2023-01-17 PROCEDURE — 700101 HCHG RX REV CODE 250: Performed by: PEDIATRICS

## 2023-01-17 PROCEDURE — A4212 NON CORING NEEDLE OR STYLET: HCPCS

## 2023-01-17 PROCEDURE — 700111 HCHG RX REV CODE 636 W/ 250 OVERRIDE (IP): Performed by: PEDIATRICS

## 2023-01-17 PROCEDURE — 81003 URINALYSIS AUTO W/O SCOPE: CPT

## 2023-01-17 RX ORDER — LIDOCAINE AND PRILOCAINE 25; 25 MG/G; MG/G
CREAM TOPICAL PRN
Status: DISCONTINUED | OUTPATIENT
Start: 2023-01-17 | End: 2023-01-18 | Stop reason: HOSPADM

## 2023-01-17 RX ORDER — SODIUM CHLORIDE 9 MG/ML
20 INJECTION, SOLUTION INTRAVENOUS PRN
Status: CANCELLED | OUTPATIENT
Start: 2023-01-17

## 2023-01-17 RX ORDER — HEPARIN SODIUM (PORCINE) LOCK FLUSH IV SOLN 100 UNIT/ML 100 UNIT/ML
500 SOLUTION INTRAVENOUS PRN
Status: CANCELLED | OUTPATIENT
Start: 2023-01-17

## 2023-01-17 RX ORDER — SODIUM CHLORIDE 9 MG/ML
20 INJECTION, SOLUTION INTRAVENOUS PRN
Status: DISCONTINUED | OUTPATIENT
Start: 2023-01-17 | End: 2023-01-18 | Stop reason: HOSPADM

## 2023-01-17 RX ORDER — HEPARIN SODIUM,PORCINE 10 UNIT/ML
30 VIAL (ML) INTRAVENOUS PRN
Status: DISCONTINUED | OUTPATIENT
Start: 2023-01-17 | End: 2023-01-18 | Stop reason: HOSPADM

## 2023-01-17 RX ORDER — PROMETHAZINE HYDROCHLORIDE 6.25 MG/5ML
0.25 SYRUP ORAL EVERY 6 HOURS PRN
Status: CANCELLED | OUTPATIENT
Start: 2023-01-18

## 2023-01-17 RX ORDER — HEPARIN SODIUM (PORCINE) LOCK FLUSH IV SOLN 100 UNIT/ML 100 UNIT/ML
500 SOLUTION INTRAVENOUS PRN
Status: DISCONTINUED | OUTPATIENT
Start: 2023-01-17 | End: 2023-01-18 | Stop reason: HOSPADM

## 2023-01-17 RX ORDER — 0.9 % SODIUM CHLORIDE 0.9 %
20 VIAL (ML) INJECTION PRN
Status: CANCELLED | OUTPATIENT
Start: 2023-01-17

## 2023-01-17 RX ORDER — ONDANSETRON 2 MG/ML
8 INJECTION INTRAMUSCULAR; INTRAVENOUS EVERY 8 HOURS PRN
Status: CANCELLED | OUTPATIENT
Start: 2023-01-18

## 2023-01-17 RX ORDER — DEXTROSE MONOHYDRATE, SODIUM CHLORIDE, AND POTASSIUM CHLORIDE 50; 1.49; 4.5 G/1000ML; G/1000ML; G/1000ML
INJECTION, SOLUTION INTRAVENOUS CONTINUOUS
Status: DISCONTINUED | OUTPATIENT
Start: 2023-01-17 | End: 2023-01-18 | Stop reason: HOSPADM

## 2023-01-17 RX ORDER — HEPARIN SODIUM,PORCINE 10 UNIT/ML
30 VIAL (ML) INTRAVENOUS PRN
Status: CANCELLED | OUTPATIENT
Start: 2023-01-17

## 2023-01-17 RX ORDER — ONDANSETRON 2 MG/ML
8 INJECTION INTRAMUSCULAR; INTRAVENOUS ONCE
Status: COMPLETED | OUTPATIENT
Start: 2023-01-17 | End: 2023-01-17

## 2023-01-17 RX ORDER — SULFAMETHOXAZOLE AND TRIMETHOPRIM 800; 160 MG/1; MG/1
2 TABLET ORAL
COMMUNITY
Start: 2023-01-14 | End: 2023-05-05 | Stop reason: SDUPTHER

## 2023-01-17 RX ORDER — ONDANSETRON 2 MG/ML
8 INJECTION INTRAMUSCULAR; INTRAVENOUS ONCE
Status: CANCELLED | OUTPATIENT
Start: 2023-01-18

## 2023-01-17 RX ORDER — THIOGUANINE 40 MG/1
100 TABLET ORAL DAILY
Qty: 30 TABLET | Refills: 3 | Status: ON HOLD | OUTPATIENT
Start: 2023-01-17 | End: 2023-02-07

## 2023-01-17 RX ADMIN — POTASSIUM CHLORIDE, DEXTROSE MONOHYDRATE AND SODIUM CHLORIDE: 150; 5; 450 INJECTION, SOLUTION INTRAVENOUS at 13:10

## 2023-01-17 RX ADMIN — CYTARABINE 130 MG: 20 INJECTION, SOLUTION INTRATHECAL; INTRAVENOUS; SUBCUTANEOUS at 15:11

## 2023-01-17 RX ADMIN — ONDANSETRON HYDROCHLORIDE 8 MG: 2 SOLUTION INTRAMUSCULAR; INTRAVENOUS at 11:50

## 2023-01-17 RX ADMIN — LIDOCAINE AND PRILOCAINE 1 APPLICATION: 25; 25 CREAM TOPICAL at 08:57

## 2023-01-17 RX ADMIN — SODIUM CHLORIDE 1340 ML: 9 INJECTION, SOLUTION INTRAVENOUS at 10:05

## 2023-01-17 RX ADMIN — HEPARIN, PORCINE (PF) 10 UNIT/ML INTRAVENOUS SYRINGE 30 UNITS: at 17:08

## 2023-01-17 RX ADMIN — POTASSIUM CHLORIDE, DEXTROSE MONOHYDRATE AND SODIUM CHLORIDE: 150; 5; 450 INJECTION, SOLUTION INTRAVENOUS at 09:15

## 2023-01-17 RX ADMIN — CYCLOPHOSPHAMIDE 1860 MG: 200 INJECTION, SOLUTION INTRAVENOUS at 12:09

## 2023-01-17 ASSESSMENT — FIBROSIS 4 INDEX: FIB4 SCORE: 0.72

## 2023-01-17 NOTE — PROGRESS NOTES
"Pharmacy Chemotherapy Calculations Note:    Dx: B-ALL, PH+    Cycle: Delayed Intensification Day 29 (delayed for infection/admission)  On Study - [id number] arm B COG ID number: [id number]  Previous treatment: D15 = 12/20             BP (!) 122/93   Pulse 96   Temp 37.6 °C (99.6 °F) (Temporal)   Resp 18   Ht 1.65 m (5' 4.96\")   Wt 64.2 kg (141 lb 8.6 oz)   SpO2 100%   BMI 23.58 kg/m²  Body surface area is 1.72 meters squared.  Treatment plan 165 cm 75.5 kg 1.86 m²   Labs reviewed 1/17/2023; MD would like to proceed with tx based on 1.86 m2 BSA        INTRATHECAL Methotrexate PF 12 mg fixed dose for age, no calculations required   Administration by MD only Patient forgot to hold anticoagulation, pushed to day 30    Cyclophosphamide 1000 mg/m² x 1.72 m² = 1720 mg   <10% difference, okay to treat with final dose = 1860 mg    Cytarabine 75 mg/m² x 1.72 m² = 129 mg   <10% difference, okay to treat with final dose = 130 mg    Cherrie Decker, PharmD, BCOP  Ubaldo Rodriguez, PharmD, BCPS            "

## 2023-01-18 ENCOUNTER — PATIENT OUTREACH (OUTPATIENT)
Dept: PEDIATRIC HEMATOLOGY/ONCOLOGY | Facility: OUTPATIENT CENTER | Age: 23
End: 2023-01-18

## 2023-01-18 ENCOUNTER — HOSPITAL ENCOUNTER (OUTPATIENT)
Dept: INFUSION CENTER | Facility: MEDICAL CENTER | Age: 23
End: 2023-01-18
Attending: PEDIATRICS
Payer: COMMERCIAL

## 2023-01-18 VITALS
SYSTOLIC BLOOD PRESSURE: 121 MMHG | TEMPERATURE: 99.6 F | HEIGHT: 65 IN | BODY MASS INDEX: 23.54 KG/M2 | WEIGHT: 141.31 LBS | DIASTOLIC BLOOD PRESSURE: 79 MMHG | RESPIRATION RATE: 20 BRPM | OXYGEN SATURATION: 99 % | HEART RATE: 104 BPM

## 2023-01-18 DIAGNOSIS — C91.01 ACUTE LYMPHOBLASTIC LEUKEMIA (ALL) IN REMISSION (HCC): ICD-10-CM

## 2023-01-18 DIAGNOSIS — Z51.11 ENCOUNTER FOR CHEMOTHERAPY MANAGEMENT: ICD-10-CM

## 2023-01-18 DIAGNOSIS — C91.Z0 B LYMPHOBLASTIC LEUKEMIA WITH T(9;22)(Q34;Q11.2);BCR-ABL1 (HCC): ICD-10-CM

## 2023-01-18 LAB
BURR CELLS/RBC NFR CSF MANUAL: 0 %
CLARITY CSF: CLEAR
COLOR CSF: COLORLESS
COLOR SPUN CSF: COLORLESS
CSF COMMENTS 1658: NORMAL
CYTOLOGY REG CYTOL: NORMAL
LYMPHOCYTES NFR CSF: 92 %
MONONUC CELLS NFR CSF: 8 %
RBC # CSF: 11 CELLS/UL
SPECIMEN VOL CSF: 2 ML
TUBE # CSF: 2
TUBE # CSF: 2
WBC # CSF: 1 CELLS/UL (ref 0–10)

## 2023-01-18 PROCEDURE — 96450 CHEMOTHERAPY INTO CNS: CPT | Performed by: PEDIATRICS

## 2023-01-18 PROCEDURE — 96450 CHEMOTHERAPY INTO CNS: CPT

## 2023-01-18 PROCEDURE — 96375 TX/PRO/DX INJ NEW DRUG ADDON: CPT

## 2023-01-18 PROCEDURE — 99214 OFFICE O/P EST MOD 30 MIN: CPT | Performed by: PEDIATRICS

## 2023-01-18 PROCEDURE — 88108 CYTOPATH CONCENTRATE TECH: CPT

## 2023-01-18 PROCEDURE — 700111 HCHG RX REV CODE 636 W/ 250 OVERRIDE (IP): Performed by: PEDIATRICS

## 2023-01-18 PROCEDURE — 89051 BODY FLUID CELL COUNT: CPT

## 2023-01-18 PROCEDURE — 96409 CHEMO IV PUSH SNGL DRUG: CPT

## 2023-01-18 RX ORDER — MIDAZOLAM HYDROCHLORIDE 1 MG/ML
2 INJECTION INTRAMUSCULAR; INTRAVENOUS
Status: CANCELLED
Start: 2023-01-18

## 2023-01-18 RX ORDER — ONDANSETRON 2 MG/ML
8 INJECTION INTRAMUSCULAR; INTRAVENOUS ONCE
Status: CANCELLED | OUTPATIENT
Start: 2023-01-20

## 2023-01-18 RX ORDER — 0.9 % SODIUM CHLORIDE 0.9 %
20 VIAL (ML) INJECTION PRN
Status: CANCELLED | OUTPATIENT
Start: 2023-01-18

## 2023-01-18 RX ORDER — PROMETHAZINE HYDROCHLORIDE 6.25 MG/5ML
0.25 SYRUP ORAL EVERY 6 HOURS PRN
Status: CANCELLED | OUTPATIENT
Start: 2023-01-19

## 2023-01-18 RX ORDER — ONDANSETRON 2 MG/ML
8 INJECTION INTRAMUSCULAR; INTRAVENOUS EVERY 8 HOURS PRN
Status: CANCELLED | OUTPATIENT
Start: 2023-01-20

## 2023-01-18 RX ORDER — LIDOCAINE 40 MG/G
CREAM TOPICAL PRN
Status: DISCONTINUED | OUTPATIENT
Start: 2023-01-18 | End: 2023-01-19 | Stop reason: HOSPADM

## 2023-01-18 RX ORDER — ONDANSETRON 2 MG/ML
8 INJECTION INTRAMUSCULAR; INTRAVENOUS EVERY 8 HOURS PRN
Status: CANCELLED | OUTPATIENT
Start: 2023-01-19

## 2023-01-18 RX ORDER — HEPARIN SODIUM,PORCINE 10 UNIT/ML
30 VIAL (ML) INTRAVENOUS PRN
Status: CANCELLED | OUTPATIENT
Start: 2023-01-18

## 2023-01-18 RX ORDER — PROMETHAZINE HYDROCHLORIDE 6.25 MG/5ML
0.25 SYRUP ORAL EVERY 6 HOURS PRN
Status: CANCELLED | OUTPATIENT
Start: 2023-01-20

## 2023-01-18 RX ORDER — ONDANSETRON 2 MG/ML
8 INJECTION INTRAMUSCULAR; INTRAVENOUS ONCE
Status: COMPLETED | OUTPATIENT
Start: 2023-01-18 | End: 2023-01-18

## 2023-01-18 RX ORDER — HEPARIN SODIUM (PORCINE) LOCK FLUSH IV SOLN 100 UNIT/ML 100 UNIT/ML
500 SOLUTION INTRAVENOUS PRN
Status: DISCONTINUED | OUTPATIENT
Start: 2023-01-18 | End: 2023-01-19 | Stop reason: HOSPADM

## 2023-01-18 RX ORDER — ONDANSETRON 2 MG/ML
8 INJECTION INTRAMUSCULAR; INTRAVENOUS ONCE
Status: CANCELLED | OUTPATIENT
Start: 2023-01-19

## 2023-01-18 RX ORDER — MIDAZOLAM HYDROCHLORIDE 1 MG/ML
2 INJECTION INTRAMUSCULAR; INTRAVENOUS
Status: DISCONTINUED | OUTPATIENT
Start: 2023-01-18 | End: 2023-01-19 | Stop reason: HOSPADM

## 2023-01-18 RX ORDER — HEPARIN SODIUM (PORCINE) LOCK FLUSH IV SOLN 100 UNIT/ML 100 UNIT/ML
500 SOLUTION INTRAVENOUS PRN
Status: CANCELLED | OUTPATIENT
Start: 2023-01-18

## 2023-01-18 RX ADMIN — CYTARABINE 130 MG: 20 INJECTION, SOLUTION INTRATHECAL; INTRAVENOUS; SUBCUTANEOUS at 15:00

## 2023-01-18 RX ADMIN — MIDAZOLAM 2 MG: 1 INJECTION INTRAMUSCULAR; INTRAVENOUS at 13:52

## 2023-01-18 RX ADMIN — HEPARIN 500 UNITS: 100 SYRINGE at 15:09

## 2023-01-18 RX ADMIN — METHOTREXATE 12 MG: 25 INJECTION INTRA-ARTERIAL; INTRAMUSCULAR; INTRATHECAL; INTRAVENOUS at 14:02

## 2023-01-18 RX ADMIN — ONDANSETRON 8 MG: 2 INJECTION INTRAMUSCULAR; INTRAVENOUS at 13:41

## 2023-01-18 ASSESSMENT — FIBROSIS 4 INDEX: FIB4 SCORE: 0.49

## 2023-01-18 NOTE — PROGRESS NOTES
"Pharmacy Chemotherapy Verification Note:    Dx: Ph+ B-ALL        Protocol: Delayed Intensification Part 2 per [id number] (On Study Arm B, ID number: [id number])         Allergies:  Amoxicillin     /78   Pulse 88   Temp 37.6 °C (99.6 °F) (Temporal)   Resp 18   Ht 1.65 m (5' 4.96\")   Wt 64.1 kg (141 lb 5 oz)   SpO2 98%   BMI 23.54 kg/m²  Body surface area is 1.71 meters squared.  Weight Type Height Weight BSA   Treatment plan 165 cm 75.5 kg 1.86 m²     Labs from 1/17/23  reviewed - all within treatment parameters.      Drug Order   (Drug name, dose, route, IV Fluid & volume, frequency, number of doses) Cycle: Delayed Intensification Day 30  *delayed for infection/hospitalization  Previous treatment: DI D15 12/20/22   Medication = Cytarabine  Base Dose = 75 mg/m2  Calc Dose: Base Dose x 1.72 m2 = 129 mg  Final Dose = 130 mg  Route = IV  Fluid & Volume = 6.5 mL in syringe as undiluted drug  Conc = 20 mg/mL  Admin Duration = Over 5 minutes   Days 29-32 and 36-39      <10% difference, ok to treat with final dose   Medication = cyclophosphamide  Base Dose = 1000 mg/m2  Calc Dose:Base Dose x 1.72 m2 = 1720 mg  Final Dose = 1860 mg  Route = IV  Fluid & Volume =  mL  Admin Duration = Over 30 min   Day 29      <10% difference, okay to treat with final dose   Medication = Methotrexate  Base Dose = 12 mg fixed dose  Calc Dose: n/a  Final Dose = 12 mg  Route = INTRATHECAL  Fluid & Volume = PF NS 6 mL  Admin Duration = to be given by MD   Day 29 and 36  Day 29 to be given on day 30      <10% difference, okay to treat with final dose     By my signature below, I confirm this process was performed independently with the BSA and all final chemotherapy dosing calculations congruent. I have reviewed the above chemotherapy order and that my calculation of the final dose and BSA (when applicable) corroborate those calculations of the  pharmacist.     Ubaldo Rodriguez, PharmD, BCPS      "

## 2023-01-18 NOTE — PROGRESS NOTES
Pt to [facility] for lab draw, doctors office visit, and chemotherapy administration.      Afebrile.  VSS.  Awake and alert in no acute distress.      Port accessed using a 22g 3/4 inch trevino needle with 1 attempt.  Labs drawn from the port without difficulty.   Pt tolerated well.     Prehydration started at 0915. Bolus started at 1005. Specific Gravity 1.006. Ok to proceed with chemotherapy.     Chemotherapy dosage calculated independently by Mary RN and Huong RN and compared to road map for protocol [id number] (ON STUDY).  Calculations within 10% of written order.  Lab results reviewed.      Premedications and chemo given as ordered, see MAR.  Blood return verified prior to, and after chemotherapy infusion.  See Chemotherapy flowsheet.      Post hydration started at 1310. PT tolerated well.  No side effects or complications noted.  Ok to d/c patient at 1700 per Dr. Faye. Pt is to take in 30 ounces of fluids prior to bed tonight. Pt verbalized understanding. Port flushed per orders (see MAR) and remains accessed for next visit. PT home with Mother.  Will return for next visit on 1/18/23.

## 2023-01-18 NOTE — PROGRESS NOTES
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 permissive of proceeding with Day 15 of Interim Maintenance  AR-Experimental Arm B, Interim Maintenance, Day 15 delayed 4 days, start 10/17/2022  AR-Experimental Arm B, Interim Maintenance, Day 29, start 11/1/2022  AR-Experimental Arm B, Interim Maintenance, Day 43, start 11/14/2022  Last does of 6-MP 11/27/2022  AR-Experimental Arm B, Delayed Intensification, Day 1, start 12/6/2022  Admission with Severe Septic Shock 12/27/2022  Imatinib HELD 12/27/2022-1/8/2023  AR-Experimental Arm B, Delayed Intensification, Day 29 DELAYED 14 day with start 1/17/2023      Past Medical History:    1) Previously Healthy  2) 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  3) Hyperleukocytosis  4) Hyperuricemia  5) Hyperphosphatemia  6) Right Lower Extremity Superficial Thrombus  7) Subsegmental Pulmonary Embolism  8) 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  Engineering major at [employer]   Two dogs.  Everyone is well in the house. Father not involved.     Family History:     Family History             Family History   Problem Relation Age of Onset    No Known Problems Mother      Diabetes Paternal Grandfather      Hypertension Paternal Grandfather      Hyperlipidemia Paternal Grandfather      Cancer Neg Hx      Heart Disease Neg Hx      Stroke Neg Hx           No significant family history of cancer.  Both maternal and paternal family history of diabetes.     Immunizations:  UTD    Medications:   Current Outpatient Medications on File Prior to Encounter   Medication Sig Dispense Refill    sulfamethoxazole-trimethoprim (BACTRIM DS) 800-160 MG tablet       Apixaban (ELIQUIS PO) Take  by mouth.      imatinib (GLEEVEC) 400 MG tablet TAKE 1 TABLET BY MOUTH  IN AM (650MG TOTAL DAILY DOSE) 30 Tablet 5    imatinib (GLEEVEC) 100 MG tablet TAKE 2.5 TABLET BY MOUTH  EVERY EVENING (650 mg TOTAL DAILY DOSE) 90 Tablet 5    famotidine (PEPCID) 20 MG Tab Take 1 Tablet by mouth 2 times a day. 60 Tablet 0    vitamin D3 (CHOLECALCIFEROL) 1000 Unit (25 mcg) Tab Take 1 Tablet by mouth every day.      ondansetron (ZOFRAN ODT) 8 MG TABLET DISPERSIBLE Dissovle 1 Tablet by mouth every 8 hours as needed for Nausea. 20 Tablet 0    therapeutic multivitamin-minerals (THERAGRAN-M) Tab Take 1 Tablet by mouth every day.       No current facility-administered medications on file prior to encounter.     OBJECTIVE:     Vitals:   /73   Pulse 89   Temp 37.7 °C (99.9 °F) (Temporal)   Resp 18   Ht 1.65 m (5' 4.96\")   Wt 64.2 kg (141 lb 8.6 oz)   SpO2 98%     Labs:  Hospital Outpatient Visit on 01/17/2023   Component Date Value    WBC " 01/17/2023 3.3 (L)     RBC 01/17/2023 3.21 (L)     Hemoglobin 01/17/2023 9.2 (L)     Hematocrit 01/17/2023 29.0 (L)     MCV 01/17/2023 90.3     MCH 01/17/2023 28.7     MCHC 01/17/2023 31.7 (L)     RDW 01/17/2023 56.3 (H)     Platelet Count 01/17/2023 158 (L)     MPV 01/17/2023 9.5     Neutrophils-Polys 01/17/2023 61.00     Lymphocytes 01/17/2023 24.50     Monocytes 01/17/2023 13.60 (H)     Eosinophils 01/17/2023 0.00     Basophils 01/17/2023 0.30     Immature Granulocytes 01/17/2023 0.60     Nucleated RBC 01/17/2023 1.20     Neutrophils (Absolute) 01/17/2023 2.02     Lymphs (Absolute) 01/17/2023 0.81 (L)     Monos (Absolute) 01/17/2023 0.45     Eos (Absolute) 01/17/2023 0.00     Baso (Absolute) 01/17/2023 0.01     Immature Granulocytes (a* 01/17/2023 0.02     NRBC (Absolute) 01/17/2023 0.04     Sodium 01/17/2023 138     Potassium 01/17/2023 3.9     Chloride 01/17/2023 106     Co2 01/17/2023 22     Anion Gap 01/17/2023 10.0     Glucose 01/17/2023 84     Bun 01/17/2023 8     Creatinine 01/17/2023 0.77     Calcium 01/17/2023 8.5     AST(SGOT) 01/17/2023 19     ALT(SGPT) 01/17/2023 27     Alkaline Phosphatase 01/17/2023 91     Total Bilirubin 01/17/2023 0.4     Albumin 01/17/2023 3.5     Total Protein 01/17/2023 6.0     Globulin 01/17/2023 2.5     A-G Ratio 01/17/2023 1.4     Color 01/17/2023 Yellow     Character 01/17/2023 Clear     Specific Gravity 01/17/2023 1.024     Ph 01/17/2023 6.0     Glucose 01/17/2023 Negative     Ketones 01/17/2023 Trace (A)     Protein 01/17/2023 30 (A)     Bilirubin 01/17/2023 Negative     Urobilinogen, Urine 01/17/2023 1.0     Nitrite 01/17/2023 Negative     Leukocyte Esterase 01/17/2023 Negative     Occult Blood 01/17/2023 Negative     Micro Urine Req 01/17/2023 Microscopic     WBC 01/17/2023 2-5 (A)     RBC 01/17/2023 0-2 (A)     Bacteria 01/17/2023 Negative     Epithelial Cells 01/17/2023 Negative     Mucous Threads 01/17/2023 Few     Ca Oxalate Crystal 01/17/2023 Rare     Hyaline Cast  01/17/2023 6-10 (A)     Correct Calcium 01/17/2023 8.9     GFR (CKD-EPI) 01/17/2023 130     Color 01/17/2023 Yellow     Character 01/17/2023 Clear     Specific Gravity 01/17/2023 1.006     Ph 01/17/2023 6.0     Glucose 01/17/2023 Negative     Ketones 01/17/2023 Negative     Protein 01/17/2023 Negative     Bilirubin 01/17/2023 Negative     Urobilinogen, Urine 01/17/2023 0.2     Nitrite 01/17/2023 Negative     Leukocyte Esterase 01/17/2023 Negative     Occult Blood 01/17/2023 Negative     Micro Urine Req 01/17/2023 see below       Physical Exam:    Constitutional: Well-developed, well-nourished, and in no distress.  Well-appearing.  HENT: Normocephalic and atraumatic. No nasal congestion or rhinorrhea. Oropharynx is clear and moist. No oral ulcerations or sores.    Eyes: Conjunctivae are normal. Pupils are equal, round.  EOMI.  Nonicteric.  Neck: Normal range of motion of neck, no adenopathy.    Cardiovascular: Normal rate, regular rhythm and normal heart sounds.  No murmur heard. DP/radial pulses 2+, cap refill < 2 sec.  Pulmonary/Chest: Effort normal and breath sounds normal. No respiratory distress. Symmetric expansion.  No crackles or wheezes.  Abdomen: Soft. Bowel sounds are normal. No distension and no mass. There is no hepatosplenomegaly.    Genitourinary:  Deferred.  Musculoskeletal: Normal range of motion of lower and upper extremities bilaterally.  Continues to have point tenderness over the left lateral aspect of calf.  Right calf without tenderness.  Gait as below.    Neurological: Alert and oriented to person and place. Exhibits normal muscle tone bilaterally in upper and lower extremities.  Able to ambulate without assistant, significant limp favoring the left leg.  Does not put full weight on left leg given pain.  Skin: Skin is warm, dry and pink.  No rash or evidence of skin infection.  No pallor.   Psychiatric: Mood and affect normal for age.    ASSESSMENT AND PLAN:     Tomas Jean-Baptiste is  a previously healthy 22 year old male with  Precursor B-Cell Lymphoblastic Leukemia with BCR-ABL1 fusion and whose End of Induction IB MRD was both negative by flow cytometry and PCR who presents after recovered from severe septic shock for Delayed Intensification, Day 29 therapy      1) Ph+ Precursor B-Cell Acute Lymphoblastic Leukemia, in MRD Remission:              - 2-3 weeks of symptoms              - Presenting WBC > 440 k/uL, hyperleukocytosis              - Start of Hydroxyurea (1500 mg PO Q8) 2320 on 5/27/2022  - discontinued after only 55 hours              - No steroid pretreatment              - 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              - Testicular status NEGATIVE                   - Flow cytometry of both 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, FISH positive for BCR-ABL1 translocation              - Enrolled on [facility] [id number]              - Initially enrolled on [facility] [id number] - but 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                            - Enrolled on [facility] [id number] and began study 6/13/2022              - Started imatinib therapy 6/3/2022   - End of Induction IA and IB MRD negative             - Imatinib dose increased to 400 mg PO AM and 250 mg PM 9/29/2022   -* Note:  All imatinib doses given inpatient rounded down to 600 mg              - Imatinib held for Septic Shock 12/27/2022-1/8/2023              - Imatinib 600 mg PO daily restarted 1/8/2023 inpatient     - WBC 3.3, Hgb 9.2, platelets 158   - ANC 2020,     - AST 19, ALT 27, total bilirubin 0.4     - No acute dose-limiting toxicities.  Recovered blood counts.  Recovery from severe septic shock.  Completed 16 days of cefepime from first negative culture.  No current grade 3 toxicities.  Proceed with Delayed Intensification, Day 29.  See dose adjustments for weight.     [id number], AR Arm B, Delayed Intensification, Day 29 (2 WEEKS DELAYED FOR SEVERE SEPTIC SHOCK):     **  Cyclophosphamide 1860 mg IV x1 on Day 29 (error in updating Day 29 weight - within 10% of treatment plan)   ** Cytarabine 130 mg IV x1 on Days 29, 30, 31 and 32   ** Methotrexate 12 mg IT to be given on Day 30 as patient had taken apixaban   ** Begin thioguanine 2.5 tablets x 6 days and 3 tablets x 1 day on Days 29 through 42             - Return to clinic for Day 30 lumbar puncture with intrathecal methotrexate and cytarabine             2) Chemotherapy Related Pancytopenia:              - WBC 3.3, Hgb 9.2, platelets 158   - ANC 2020,    - Transfuse for hemoglobin less than 7  - Transfuse for platelets less than 10        - No transfusion indicated today    3) Chemotherapy Induced Nausea and Vomiting:   - Zofran prior to chemotherapy   - Zofran and Ativan available PRN at home     4) Sixth Cranial Nerve Palsy (IMPROVED/RESOLVED):             - Followed by Dr. Carranza     5) At Risk of Opportunistic Lung Infection:             - Bactrim DS PO BID Sat and Sun to resume this weekend     6) History Escherichia coli and Klebsiella Sepsis/Septic Shock in an Immunocompromised Host:  - Completed 16 days of cefepime following first negative culture  - Port remained in place  - Left lower extremity hematoma demonstrating many white blood cells but no growth to date, will continue to follow  - Monitor for fever and signs of recurrent infection especially as counts drop    7) Leg Pain:           - Secondary to hematoma which has been aspirated, fluid was bloody with many white blood cells but not growing any bacterial organisms to date     8) Social:             - Continue with support             - Continue school as tolerated    9) Access:             - R Port-A-Cath in place      10) At Risk for Deep Venous Thrombosis (RESOLVED):           -Ambulating with relative frequency, discontinue apixaban     11) Research Participant:           [facility] - Source Data         Diagnosis: Ph+ Precursor B-Cell  Acute Lymphoblastic Leukemia     Disease Status: EOI1A MRD NEGATIVE, EOI1B RD NEGATIVE, CNS3c, testicular negative, HSV1 IgG POSITIVE, CMV IgG NEGATIVE, VARICELLA IgG POSITIVE     Active Studies: [id number], [id number]                                                                                                      Inactive Studies: [id number]                                                                                                                                                Optional Studies: None             Protocol: International Phase 3 trial in [place] 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      Treatment Plan: [id number](OS), AR-Arm B, Delayed Intensification, Day 29 (DELAYED 2 WEEKS FOR SEVERE SEPTIC SHOCK)     Height: 1.650 m      Weight: 64.2kg       BSA: 1.72 m²   (Delayed Intensification Day 29 1/17/2023)                                                                                                                                           Performance Status: Karnofsky 70, ECOG  2 (1/17/2023)     Treatment Plan Medications:       Currently taking imatinib 650 mg PO daily (Started 1/8/2023)     ** Will dose adjust today for drop in BSA to 600 mg PO daily in AM **    Will begin thioguanine 2.5 tablets PO x 6 days and 3 tablets PO x 1 days (total 14 days)     Evaluations / Study Labs:  (1/17/2023)     Hx/PE/Wt/Ht/BSA completed  Karnofsky 70, ECOG 2     WBC 3.3, Hgb 9.2, platelets 158  ANC 2020,     Creatinine 0.77  AST 19, ALT 27, total bilirubin 0.4    Therapy Given: (1/17/2023)     Cyclophosphamide 1860 mg IV x1 dose  Cytarabine 130 mg IV x1 dose  Begin thioguanine 2.5 tablets PO x6 days and 3 tablets PO x1 day  Continue imatinib 600 mg PO QAM         Disposition: Return to clinic for Day 30 cytarabine and lumbar puncture with intrathecal chemotherapy.    Pepe Faye MD  Pediatric  Hematology / Oncology  [facility]  Cell.  [phone]  [facility]. [phone]

## 2023-01-19 ENCOUNTER — HOSPITAL ENCOUNTER (OUTPATIENT)
Dept: INFUSION CENTER | Facility: MEDICAL CENTER | Age: 23
End: 2023-01-19
Attending: PEDIATRICS
Payer: COMMERCIAL

## 2023-01-19 VITALS
RESPIRATION RATE: 20 BRPM | SYSTOLIC BLOOD PRESSURE: 120 MMHG | TEMPERATURE: 98.8 F | HEIGHT: 65 IN | HEART RATE: 100 BPM | DIASTOLIC BLOOD PRESSURE: 76 MMHG | OXYGEN SATURATION: 99 % | BODY MASS INDEX: 23.21 KG/M2 | WEIGHT: 139.33 LBS

## 2023-01-19 DIAGNOSIS — Z51.11 ENCOUNTER FOR CHEMOTHERAPY MANAGEMENT: ICD-10-CM

## 2023-01-19 DIAGNOSIS — C91.Z0 B LYMPHOBLASTIC LEUKEMIA WITH T(9;22)(Q34;Q11.2);BCR-ABL1 (HCC): ICD-10-CM

## 2023-01-19 DIAGNOSIS — C91.01 ACUTE LYMPHOBLASTIC LEUKEMIA (ALL) IN REMISSION (HCC): ICD-10-CM

## 2023-01-19 PROCEDURE — A4212 NON CORING NEEDLE OR STYLET: HCPCS

## 2023-01-19 PROCEDURE — 96409 CHEMO IV PUSH SNGL DRUG: CPT

## 2023-01-19 PROCEDURE — 99214 OFFICE O/P EST MOD 30 MIN: CPT | Performed by: PEDIATRICS

## 2023-01-19 PROCEDURE — 96375 TX/PRO/DX INJ NEW DRUG ADDON: CPT

## 2023-01-19 PROCEDURE — 700111 HCHG RX REV CODE 636 W/ 250 OVERRIDE (IP): Performed by: PEDIATRICS

## 2023-01-19 RX ORDER — 0.9 % SODIUM CHLORIDE 0.9 %
20 VIAL (ML) INJECTION PRN
Status: CANCELLED | OUTPATIENT
Start: 2023-01-19

## 2023-01-19 RX ORDER — ONDANSETRON 2 MG/ML
8 INJECTION INTRAMUSCULAR; INTRAVENOUS ONCE
Status: COMPLETED | OUTPATIENT
Start: 2023-01-19 | End: 2023-01-19

## 2023-01-19 RX ORDER — HEPARIN SODIUM (PORCINE) LOCK FLUSH IV SOLN 100 UNIT/ML 100 UNIT/ML
500 SOLUTION INTRAVENOUS PRN
Status: DISCONTINUED | OUTPATIENT
Start: 2023-01-19 | End: 2023-01-20 | Stop reason: HOSPADM

## 2023-01-19 RX ORDER — HEPARIN SODIUM,PORCINE 10 UNIT/ML
30 VIAL (ML) INTRAVENOUS PRN
Status: CANCELLED | OUTPATIENT
Start: 2023-01-19

## 2023-01-19 RX ORDER — HEPARIN SODIUM (PORCINE) LOCK FLUSH IV SOLN 100 UNIT/ML 100 UNIT/ML
500 SOLUTION INTRAVENOUS PRN
Status: CANCELLED | OUTPATIENT
Start: 2023-01-19

## 2023-01-19 RX ADMIN — CYTARABINE 130 MG: 20 INJECTION, SOLUTION INTRATHECAL; INTRAVENOUS; SUBCUTANEOUS at 11:58

## 2023-01-19 RX ADMIN — ONDANSETRON HYDROCHLORIDE 8 MG: 2 SOLUTION INTRAMUSCULAR; INTRAVENOUS at 11:58

## 2023-01-19 RX ADMIN — HEPARIN 500 UNITS: 100 SYRINGE at 12:09

## 2023-01-19 ASSESSMENT — FIBROSIS 4 INDEX: FIB4 SCORE: 0.49

## 2023-01-19 NOTE — H&P
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   Two dogs.  Everyone is well in the house. Father not involved.     Family History:     Family History             Family History   Problem Relation Age of Onset    No Known Problems Mother      Diabetes Paternal Grandfather      Hypertension Paternal Grandfather      Hyperlipidemia Paternal Grandfather      Cancer Neg Hx      Heart Disease Neg Hx      Stroke Neg Hx           No significant family history of cancer.  Both maternal and paternal family history of diabetes.     Immunizations:  UTD    Medications:   Current Outpatient Medications on File Prior to Encounter   Medication Sig Dispense Refill    sulfamethoxazole-trimethoprim (BACTRIM DS) 800-160 MG tablet       thioguanine (TABLOID) 40 MG tablet Take 2.5 Tablets by mouth every day. 30 Tablet 3    imatinib (GLEEVEC) 400 MG tablet TAKE 1  "TABLET BY MOUTH  IN AM (650MG TOTAL DAILY DOSE) 30 Tablet 5    imatinib (GLEEVEC) 100 MG tablet TAKE 2.5 TABLET BY MOUTH  EVERY EVENING (650 mg TOTAL DAILY DOSE) (Patient taking differently: TAKE 2 TABLET BY MOUTH  EVERY EVENING (600 mg TOTAL DAILY DOSE)) 90 Tablet 5    famotidine (PEPCID) 20 MG Tab Take 1 Tablet by mouth 2 times a day. 60 Tablet 0    vitamin D3 (CHOLECALCIFEROL) 1000 Unit (25 mcg) Tab Take 1 Tablet by mouth every day.      ondansetron (ZOFRAN ODT) 8 MG TABLET DISPERSIBLE Dissovle 1 Tablet by mouth every 8 hours as needed for Nausea. 20 Tablet 0    therapeutic multivitamin-minerals (THERAGRAN-M) Tab Take 1 Tablet by mouth every day.      Apixaban (ELIQUIS PO) Take  by mouth. (Patient not taking: Reported on 1/18/2023)      thioguanine (TABLOID) 40 MG tablet Take  by mouth. 16 Tablet 0    thioguanine (TABLOID) 40 MG tablet Take 2.5 tablets by mouth for 6 days of week and 3 tablets by mouth for 1 day of week (Total 14 days) 32 Tablet 0     No current facility-administered medications on file prior to encounter.     OBJECTIVE:     Vitals:   /79   Pulse (!) 104   Temp 37.6 °C (99.6 °F) (Temporal)   Resp 20   Ht 1.65 m (5' 4.96\")   Wt 64.1 kg (141 lb 5 oz)   SpO2 99%     Labs:    No new labs today.    Labs from yesterday:     WBC 01/17/2023 3.3 (L)     RBC 01/17/2023 3.21 (L)     Hemoglobin 01/17/2023 9.2 (L)     Hematocrit 01/17/2023 29.0 (L)     MCV 01/17/2023 90.3     MCH 01/17/2023 28.7     MCHC 01/17/2023 31.7 (L)     RDW 01/17/2023 56.3 (H)     Platelet Count 01/17/2023 158 (L)     MPV 01/17/2023 9.5     Neutrophils-Polys 01/17/2023 61.00     Lymphocytes 01/17/2023 24.50     Monocytes 01/17/2023 13.60 (H)     Eosinophils 01/17/2023 0.00     Basophils 01/17/2023 0.30     Immature Granulocytes 01/17/2023 0.60     Nucleated RBC 01/17/2023 1.20     Neutrophils (Absolute) 01/17/2023 2.02     Lymphs (Absolute) 01/17/2023 0.81 (L)     Monos (Absolute) 01/17/2023 0.45     Eos (Absolute) " 01/17/2023 0.00     Baso (Absolute) 01/17/2023 0.01     Immature Granulocytes (a* 01/17/2023 0.02     NRBC (Absolute) 01/17/2023 0.04     Sodium 01/17/2023 138     Potassium 01/17/2023 3.9     Chloride 01/17/2023 106     Co2 01/17/2023 22     Anion Gap 01/17/2023 10.0     Glucose 01/17/2023 84     Bun 01/17/2023 8     Creatinine 01/17/2023 0.77     Calcium 01/17/2023 8.5     AST(SGOT) 01/17/2023 19     ALT(SGPT) 01/17/2023 27     Alkaline Phosphatase 01/17/2023 91     Total Bilirubin 01/17/2023 0.4     Albumin 01/17/2023 3.5     Total Protein 01/17/2023 6.0     Globulin 01/17/2023 2.5     A-G Ratio 01/17/2023 1.4     Color 01/17/2023 Yellow     Character 01/17/2023 Clear     Specific Gravity 01/17/2023 1.024     Ph 01/17/2023 6.0     Glucose 01/17/2023 Negative     Ketones 01/17/2023 Trace (A)     Protein 01/17/2023 30 (A)     Bilirubin 01/17/2023 Negative     Urobilinogen, Urine 01/17/2023 1.0     Nitrite 01/17/2023 Negative     Leukocyte Esterase 01/17/2023 Negative     Occult Blood 01/17/2023 Negative     Micro Urine Req 01/17/2023 Microscopic     WBC 01/17/2023 2-5 (A)     RBC 01/17/2023 0-2 (A)     Bacteria 01/17/2023 Negative     Epithelial Cells 01/17/2023 Negative     Mucous Threads 01/17/2023 Few     Ca Oxalate Crystal 01/17/2023 Rare     Hyaline Cast 01/17/2023 6-10 (A)     Correct Calcium 01/17/2023 8.9     GFR (CKD-EPI) 01/17/2023 130     Color 01/17/2023 Yellow     Character 01/17/2023 Clear     Specific Gravity 01/17/2023 1.006     Ph 01/17/2023 6.0     Glucose 01/17/2023 Negative     Ketones 01/17/2023 Negative     Protein 01/17/2023 Negative     Bilirubin 01/17/2023 Negative     Urobilinogen, Urine 01/17/2023 0.2     Nitrite 01/17/2023 Negative     Leukocyte Esterase 01/17/2023 Negative     Occult Blood 01/17/2023 Negative     Micro Urine Req 01/17/2023 see below         Physical Exam:    Constitutional: Well-developed, well-nourished, and in no distress.  Well-appearing.  HENT: Normocephalic and  atraumatic. No nasal congestion or rhinorrhea. Oropharynx is clear and moist. No oral ulcerations or sores.    Eyes: Conjunctivae are normal. Pupils are equal, round,.  EOMI.  Nonicteric.  Neck: Normal range of motion of neck, no adenopathy.    Cardiovascular: Normal rate, regular rhythm and normal heart sounds.  No murmur heard. DP/radial pulses 2+, cap refill < 2 sec.  Pulmonary/Chest: Effort normal and breath sounds normal. No respiratory distress. Symmetric expansion.  No crackles or wheezes.  Abdomen: Soft. Bowel sounds are normal. No distension and no mass. There is no hepatosplenomegaly.    Genitourinary:  Deferred.  Musculoskeletal: Normal range of motion of lower and upper extremities bilaterally.  Moderate tenderness to left lateral calf.    Neurological: Alert and oriented to person and place. Exhibits normal muscle tone bilaterally in upper and lower extremities. Gait shuffled but improving.  Skin: Skin is warm, dry and pink.  No rash or evidence of skin infection.  No pallor.   Psychiatric: Mood and affect normal for age.    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 [id number]              - Initially enrolled on [facility] [id number] - but 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                            - Enrolled on [facility] [id number] and began study 6/13/2022              - Started imatinib therapy 6/3/2022   - End of Induction IA and IB MRD negative                        - Imatinib dose increased to 400 mg PO AM and 250 mg PM 9/29/2022              -* Note:  All imatinib doses given inpatient rounded down to 600 mg                         - Imatinib held for Septic Shock 12/27/2022-1/8/2023                         - Imatinib 600 mg PO daily restarted 1/8/2023 inpatient    - Imatinib 400 mg PO QAM and 250 mg PO QHS 1/14/2023-1/17/2023                 - WBC 3.3, Hgb 9.2, platelets 158 yesterday              - ANC 2020,  yesterday              - AST 19, ALT 27, total bilirubin 0.4 yesterday                 - Delayed Intensification, Day 29 yesterday    [place] [id number], AR Arm B, Delayed Intensification, Day 30 (2 WEEKS DELAYED FOR SEVERE SEPTIC SHOCK):               ** Cyclophosphamide 1860 mg IV x1 on Day 29 (error in updating Day 29 weight - within 10% of treatment plan)              ** Cytarabine 130 mg IV x1 on Days 29 (COMPLETE), 30, 31 and 32              ** Methotrexate 12 mg IT today (see separate procedure note)              ** Begin thioguanine 2.5 tablets x 6 days and 3 tablets x 1 day on Days 29 through 42 (due to specialty pharmacy needing to supply medication, thioguanine to start today)             - Return to clinic for Day 31 cytarabine             2) Chemotherapy Related Pancytopenia:              - WBC 3.3, Hgb 9.2, platelets 158 yesterday              - ANC 2020,  yesterday  - Transfuse for hemoglobin less than 7  - Transfuse for platelets less than 10         - No transfusion indicated today     3) Chemotherapy Induced Nausea and Vomiting:              - Zofran prior to chemotherapy               - Zofran and Ativan available PRN at home     4) Sixth Cranial Nerve Palsy (IMPROVED/RESOLVED):             - Followed by Dr. Carranza     5) At Risk of Opportunistic Lung Infection:             - Bactrim DS PO BID Sat and Sun      6) History Escherichia coli and Klebsiella Sepsis/Septic Shock in an Immunocompromised Host:  - Completed 16 days of cefepime following first negative culture  - Port remained in place  - Left lower extremity hematoma demonstrating many white blood cells but no growth to date, will continue to follow  - Monitor for fever and signs of recurrent infection especially as counts drop     7) Leg Pain:           - Secondary to hematoma which has been aspirated, fluid was bloody with many white blood cells but not growing any bacterial organisms to date     8) Social:             - Continue with support             - Continue school as tolerated     9) Access:             - R Port-A-Cath in place      10) At Risk for Deep Venous Thrombosis (RESOLVED):           - Ambulating with improved frequency, no apixaban currently     11) Research Participant:           [facility] - Source Data         Diagnosis: Ph+ Precursor B-Cell Acute Lymphoblastic Leukemia     Disease Status: EOI1A MRD NEGATIVE, EOI1B RD NEGATIVE, CNS3c, testicular negative, HSV1 IgG POSITIVE, CMV IgG NEGATIVE, VARICELLA IgG POSITIVE     Active Studies: [id number], [id number]                                                                                                      Inactive Studies: [id number]                                                                                                                                                Optional Studies: None             Protocol: International Phase 3 trial in [place] 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      Treatment Plan: [id number](OS), AR-Arm B,  Delayed Intensification, Day 230 (DELAYED 2 WEEKS FOR SEVERE SEPTIC SHOCK)     Height: 1.650 m      Weight: 64.2kg       BSA: 1.72 m²   (Delayed Intensification Day 29 1/17/2023)                                                                                                                                           Performance Status: Karnofsky 70, ECOG  2 (1/17/2023)     Treatment Plan Medications:  (100% adherent with imatinib)     Imatinib dose adjusted for weight at DI, Day 29 to Imatinib 600 mg PO daily in AM     Will begin thioguanine 2.5 tablets PO x 6 days and 3 tablets PO x 1 days (total 14 days) today as obtaining medication from specialty pharmacy caused 1 day delay     Evaluations / Study Labs:  (1/17/2023)     Hx/PE/Wt/Ht/BSA completed  Karnofsky 70, ECOG 2     WBC 3.3, Hgb 9.2, platelets 158  ANC 2020,      Creatinine 0.77  AST 19, ALT 27, total bilirubin 0.4     Therapy Given: (1/18/2023)    Methotrexate 12 mg IT  Cytarabine 130 mg IV x1 dose  Thioguanine 2.5 tablets PO x6 days and 3 tablets PO x1 day  Imatinib 600 mg PO QAM         Disposition: Return to clinic for Day 31 cytarabine and lumbar puncture with intrathecal chemotherapy.     Pepe Faye MD  Pediatric Hematology / Oncology  [facility]  Cell.  [phone]  [facility]. [phone]

## 2023-01-19 NOTE — PROGRESS NOTES
Pediatric Hematology / Oncology  Progress Note      Patient Name:  Tomas Jean-Baptiste  : 2000   MRN: [id number]    Location of Service:  [facility]  Date of Service: 2023  Time: 2:25 PM    Primary Care Physician: Margarito Arvizu M.D.    Protocol/Treatment Plan: [place] Chromosome Precursor B-Cell Acute Lymphoblastic Leukemia, ON STUDY [id number], Delayed Intensification, Day 31 therapy     HISTORY OF PRESENT ILLNESS:     Chief Complaint: Scheduled Chemotherapy    History of Present Illness: Tomas Jean-Baptitse is a 22 y.o. male who presents to the [facility] for scheduled chemotherapy.  Today is Day 31 of of Delayed Intensification.  Tomas Roy presents back to clinic today by himself for therapy and provides accurate overnight history.    No concerns or complaints this AM with the exception of persistent pain on right rib where he had knowingly bumped into a table.  No bruising reported.  Tomas Roy is otherwise well without any fevers overnight.  No signs of acute illness.  No ambulating without the assistance of a walker, using a cane only.  Maintaining normal heart rate.  No complaints of light headedness or tunnel vision. No complaints of shortness of breath or difficulty breathing.  No other overnight issues.    Review of Systems:     Constitutional:  Afebrile. No remote or acute illness.  Energy and activity are at baseline.    HENT: Negative for auditory changes, nosebleeds and sore throat.  No mouth sores.  Eyes: Negative for visual changes.  Respiratory: Negative for shortness of breath or stridor.   Cardiovascular: Negative for chest pain or extremity swelling.    Gastrointestinal: Negative for nausea, vomiting, abdominal pain, diarrhea, constipation or blood in stool.    Genitourinary: Negative for dysuria or flank pain.    Musculoskeletal: Negative for joint or muscle  "pain.  Skin: Negative for rash, signs of infection.  Neurological: Negative for numbness, tingling, sensory changes, weakness or headaches.    Endo/Heme/Allergies: Does not bruise/bleed easily.    Psychiatric/Behavioral: No changes in mood, appropriate for age.     PAST MEDICAL HISTORY:     Past medical history unchanged overnight.    OBJECTIVE:     Vitals:   /76   Pulse 100   Temp 37.1 °C (98.8 °F) (Temporal)   Resp 20   Ht 1.65 m (5' 4.96\")   Wt 63.2 kg (139 lb 5.3 oz)   SpO2 99%     Labs:  No new labs.    Physical Exam:    Constitutional: Well-developed, well-nourished, and in no distress.  Well-appearing.  HENT: Normocephalic and atraumatic. No nasal congestion or rhinorrhea. Oropharynx is clear and moist. No oral ulcerations or sores.    Eyes: Conjunctivae are normal. Pupils are equal, round.  EOMI.  Nonicteric.  Neck: Normal range of motion of neck, no adenopathy.    Cardiovascular: Normal rate, regular rhythm and normal heart sounds.  No murmur heard. DP/radial pulses 2+, cap refill < 2 sec  Pulmonary/Chest: Effort normal and breath sounds normal. No respiratory distress. Symmetric expansion.  No crackles or wheezes.  Abdomen: Soft. Bowel sounds are normal. No distension and no mass. There is no hepatosplenomegaly.    Genitourinary:  Deferred.  Musculoskeletal: Point tenderness to the intercostal tissues at approximately the eighth and ninth rib midaxillary line on the right side.  No swelling, no masses.  Lymphadenopathy: No cervical adenopathy, axillary adenopathy or inguinal adenopathy.   Neurological: Alert and oriented to person and place. Exhibits normal muscle tone bilaterally in upper and lower extremities. Gait normal. Coordination normal.    Skin: Skin is warm, dry and pink.  No rash or evidence of skin infection.  No pallor.   Psychiatric: Mood and affect normal for age.    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, in MRD Remission:              - 2-3 weeks of symptoms              - Presenting WBC > 440 k/uL, hyperleukocytosis              - Start of Hydroxyurea (1500 mg PO Q8) 2320 on 5/27/2022  - discontinued after only 55 hours              - No steroid pretreatment              - 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              - Testicular status NEGATIVE                   - Flow cytometry of both 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, FISH positive for BCR-ABL1 translocation              - [place] [facility] [id number]              - [place] [facility] [id number] - but 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                            - [place] [facility] [id number] and began study 6/13/2022              - Started imatinib therapy 6/3/2022   - End of Induction IA and IB MRD negative                        - Imatinib dose increased to 400 mg PO AM and 250 mg PM 9/29/2022              -* Note:  All imatinib doses given inpatient rounded down to 600 mg                         - Imatinib held for Septic Shock 12/27/2022-1/8/2023                         - Imatinib 600 mg PO daily restarted 1/8/2023 inpatient                          - Imatinib 400 mg PO QAM and 250 mg PO QHS 1/14/2023-1/17/2023                 - WBC 3.3, Hgb 9.2, platelets 158 yesterday              - ANC 2020,  yesterday              - AST 19, ALT 27, total bilirubin 0.4 yesterday                 - Delayed Intensification, Day 29 yesterday     [place] Arm B, Delayed Intensification, Day 31:               ** Cyclophosphamide 1860 mg IV x1 on Day 29 (COMPLETED)              ** Cytarabine 130 mg IV x1 on Days 29,  30 (COMPLETE) , 31 and 32              ** Continue thioguanine 2.5 tablets x 6 days and 3 tablets x 1 day on Days 29 through 42            - Return to clinic for Day 32 cytarabine             2) Chemotherapy Related Pancytopenia:              - WBC 3.3, Hgb 9.2, platelets 158 on Day 29              - ANC 2020,  on Day 29  - Transfuse for hemoglobin less than 7  - Transfuse for platelets less than 10         - No transfusion indicated today     3) Chemotherapy Induced Nausea and Vomiting:              - Zofran prior to chemotherapy              - Zofran and Ativan available PRN at home     4) Sixth Cranial Nerve Palsy (IMPROVED/RESOLVED):             - Followed by Dr. Carranza     5) At Risk of Opportunistic Lung Infection:             - Bactrim DS PO BID Sat and Sun      6) History Escherichia coli and Klebsiella Sepsis/Septic Shock in an Immunocompromised Host:  - Completed 16 days of cefepime following first negative culture  - Port remained in place  - Left lower extremity hematoma demonstrating many white blood cells but no growth to date, will continue to follow  - Monitor for fever and signs of recurrent infection especially as counts drop     7) Leg Pain:           - Secondary to hematoma which has been aspirated, fluid was bloody with many white blood cells but not growing any bacterial organisms to date     8) Social:             - Continue with support             - Continue school as tolerated     9) Access:             - R Port-A-Cath in place      10) At Risk for Deep Venous Thrombosis (RESOLVED):           - Ambulating with improved frequency, no apixaban currently    Pepe Faye MD  Pediatric Hematology / Oncology  [facility]  Cell.  [phone]  Office. [phone]

## 2023-01-19 NOTE — PROGRESS NOTES
"Pharmacy Chemotherapy Verification Note:    Dx: Ph+ B-ALL        Protocol: Delayed Intensification Part 2 per [id number] (On Study Arm B, ID number: [id number])         Allergies:  Amoxicillin     /76   Pulse (!) 106   Temp 37.5 °C (99.5 °F) (Temporal)   Resp 20   Ht 1.65 m (5' 4.96\")   Wt 63.2 kg (139 lb 5.3 oz)   BMI 23.21 kg/m²  Body surface area is 1.7 meters squared.  Weight Type Height Weight BSA   Treatment plan 165 cm 75.5 kg 1.86 m²     Labs from 1/17/23  reviewed - all within treatment parameters.      Drug Order   (Drug name, dose, route, IV Fluid & volume, frequency, number of doses) Cycle: Delayed Intensification Day 31  *delayed for infection/hospitalization  Previous treatment: DI D15 12/20/22   Medication = Cytarabine  Base Dose = 75 mg/m2  Calc Dose: Base Dose x 1.72 m2 = 129 mg  Final Dose = 130 mg  Route = IV  Fluid & Volume = 6.5 mL in syringe as undiluted drug  Conc = 20 mg/mL  Admin Duration = Over 5 minutes   Days 29-32 and 36-39      <10% difference, ok to treat with final dose   Medication = cyclophosphamide  Base Dose = 1000 mg/m2  Calc Dose:Base Dose x 1.72 m2 = 1720 mg  Final Dose = 1860 mg  Route = IV  Fluid & Volume =  mL  Admin Duration = Over 30 min   Day 29      <10% difference, okay to treat with final dose   Medication = Methotrexate  Base Dose = 12 mg fixed dose  Calc Dose: n/a  Final Dose = 12 mg  Route = INTRATHECAL  Fluid & Volume = PF NS 6 mL  Admin Duration = to be given by MD   Day 29 and 36  Day 29 to be given on day 30      <10% difference, okay to treat with final dose     By my signature below, I confirm this process was performed independently with the BSA and all final chemotherapy dosing calculations congruent. I have reviewed the above chemotherapy order and that my calculation of the final dose and BSA (when applicable) corroborate those calculations of the  pharmacist.     Ubaldo Rodriguez, PharmD, BCPS      "

## 2023-01-19 NOTE — PROCEDURES
Pediatric Oncology Lumbar Puncture  Procedure Note      Patient Name:  Tomas Jean-Baptiste  : 2000   MRN: [id number]    Service Location:  [facility]  Date of Service: 2023  Time: 2:00 PM    Procedure Performed By: Pepe Faye M.D.    Pre-procedural Diagnosis:  Acute B-Lymphoblastic Leukemia (C91.01) having achieved remission    Post-procedural Diagnosis: Acute B-Lymphoblastic Leukemia (C91.01) having achieved remission    Procedure:  Lumbar Puncture with administration of intrathecal chemotherapy    Anxiolysis: Versed 2 mg IV x1    Analgesia: Emla cream    Intrathecal Chemotherapy:  Yes    Chemotherapy Administered:  Methotrexate 12 mg IT (in 6 mL NS)    Needle Size:  22 gauge, 3.5 in  Site: L3-L4  Number of Attempts: 1  Fluid:  4 ml clear fluid obtained  Labs: Cell count, cytology    Complications:  None    Procedure Note:    Tomas Jean-Baptiste is a 22 y.o. 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  He presents today for scheduled chemotherapy, Delayed Intensification, Day 30 and scheduled lumbar puncture with intrathecal chemotherapy.  Prior to the procedure, the risks and benefits were discussed with the patient.  Consent for the procedure was signed and placed in the patient's chart.  All pertinent labs and history were reviewed and a complete History and Physical Examination were performed and placed in the medical record.  Tomas Roy was then taken to the procedure room where the procedure was performed.  All necessary safety equipment per ASA guidelines were available.  A time-out was performed and the patient identified by name,  and medical record number.  Gloves and mask were worn during the entire procedure.  A one-time dose of Versed 2 mg IV was given for anxiolysis.  Tomas Roy was then prepped and draped in the usual sterile fashion with povoiodine.  He was positioned  in the left decubitus position and all landmarks including superior posterior iliac crest, umbilicus and vertebral bodies were identified by palpation.  A 3.5 in, 22 gauge spinal needle was introduced into the L3-L4 spinal interspace.  4 ml of clear fluid were obtained and sent for cell count and cytology.  Methotrexate 12 mg in 6 mL of NS was verified with the nurse bedside and then then administered into the spinal fluid. Tomas Roy tolerated the procedure without complication or bleeding.     Results:    PENDING    Pepe Faye MD  Pediatric Hematology / Oncology  [facility]  Cell.  [phone]

## 2023-01-20 ENCOUNTER — HOSPITAL ENCOUNTER (OUTPATIENT)
Dept: INFUSION CENTER | Facility: MEDICAL CENTER | Age: 23
End: 2023-01-20
Attending: PEDIATRICS
Payer: COMMERCIAL

## 2023-01-20 VITALS
HEART RATE: 110 BPM | BODY MASS INDEX: 22.88 KG/M2 | RESPIRATION RATE: 20 BRPM | TEMPERATURE: 100 F | OXYGEN SATURATION: 98 % | HEIGHT: 65 IN | SYSTOLIC BLOOD PRESSURE: 120 MMHG | WEIGHT: 137.35 LBS | DIASTOLIC BLOOD PRESSURE: 85 MMHG

## 2023-01-20 DIAGNOSIS — C91.01 ACUTE LYMPHOBLASTIC LEUKEMIA (ALL) IN REMISSION (HCC): ICD-10-CM

## 2023-01-20 DIAGNOSIS — C91.Z0 B LYMPHOBLASTIC LEUKEMIA WITH T(9;22)(Q34;Q11.2);BCR-ABL1 (HCC): ICD-10-CM

## 2023-01-20 DIAGNOSIS — Z51.11 ENCOUNTER FOR CHEMOTHERAPY MANAGEMENT: ICD-10-CM

## 2023-01-20 PROCEDURE — 700111 HCHG RX REV CODE 636 W/ 250 OVERRIDE (IP): Performed by: PEDIATRICS

## 2023-01-20 PROCEDURE — 99215 OFFICE O/P EST HI 40 MIN: CPT | Performed by: PEDIATRICS

## 2023-01-20 RX ORDER — ONDANSETRON 2 MG/ML
8 INJECTION INTRAMUSCULAR; INTRAVENOUS ONCE
Status: COMPLETED | OUTPATIENT
Start: 2023-01-20 | End: 2023-01-20

## 2023-01-20 RX ORDER — 0.9 % SODIUM CHLORIDE 0.9 %
20 VIAL (ML) INJECTION PRN
Status: CANCELLED | OUTPATIENT
Start: 2023-01-20

## 2023-01-20 RX ORDER — HEPARIN SODIUM,PORCINE 10 UNIT/ML
30 VIAL (ML) INTRAVENOUS PRN
Status: CANCELLED | OUTPATIENT
Start: 2023-01-20

## 2023-01-20 RX ORDER — HEPARIN SODIUM (PORCINE) LOCK FLUSH IV SOLN 100 UNIT/ML 100 UNIT/ML
500 SOLUTION INTRAVENOUS PRN
Status: CANCELLED | OUTPATIENT
Start: 2023-01-20

## 2023-01-20 RX ORDER — HEPARIN SODIUM (PORCINE) LOCK FLUSH IV SOLN 100 UNIT/ML 100 UNIT/ML
500 SOLUTION INTRAVENOUS PRN
Status: DISCONTINUED | OUTPATIENT
Start: 2023-01-20 | End: 2023-01-21 | Stop reason: HOSPADM

## 2023-01-20 RX ADMIN — HEPARIN 500 UNITS: 100 SYRINGE at 15:25

## 2023-01-20 RX ADMIN — CYTARABINE 130 MG: 20 INJECTION, SOLUTION INTRATHECAL; INTRAVENOUS; SUBCUTANEOUS at 15:19

## 2023-01-20 RX ADMIN — ONDANSETRON HYDROCHLORIDE 8 MG: 2 SOLUTION INTRAMUSCULAR; INTRAVENOUS at 15:13

## 2023-01-20 ASSESSMENT — FIBROSIS 4 INDEX: FIB4 SCORE: 0.49

## 2023-01-20 NOTE — PROGRESS NOTES
Pharmacy Chemotherapy Verification Note:    Dx: Ph+ B-ALL        Protocol: Delayed Intensification Part 2 per [id number] (On Study Arm B, ID number: [id number])         Allergies:  Amoxicillin     There were no vitals taken for this visit. There is no height or weight on file to calculate BSA.  Weight Type Height Weight BSA   Treatment plan 165 cm 75.5 kg 1.86 m²     Labs from 1/17/23  reviewed - all within treatment parameters.      Drug Order   (Drug name, dose, route, IV Fluid & volume, frequency, number of doses) Cycle: Delayed Intensification Day 32  *delayed for infection/hospitalization  Previous treatment: DI D15 12/20/22   Medication = Cytarabine  Base Dose = 75 mg/m2  Calc Dose: Base Dose x 1.72 m2 = 129 mg  Final Dose = 130 mg  Route = IV  Fluid & Volume = 6.5 mL in syringe as undiluted drug  Conc = 20 mg/mL  Admin Duration = Over 5 minutes   Days 29-32 and 36-39      <10% difference, ok to treat with final dose   Medication = cyclophosphamide  Base Dose = 1000 mg/m2  Calc Dose:Base Dose x 1.72 m2 = 1720 mg  Final Dose = 1860 mg  Route = IV  Fluid & Volume =  mL  Admin Duration = Over 30 min   Day 29      <10% difference, okay to treat with final dose   Medication = Methotrexate  Base Dose = 12 mg fixed dose  Calc Dose: n/a  Final Dose = 12 mg  Route = INTRATHECAL  Fluid & Volume = PF NS 6 mL  Admin Duration = to be given by MD   Day 29 and 36  Day 29 to be given on day 30      <10% difference, okay to treat with final dose     By my signature below, I confirm this process was performed independently with the BSA and all final chemotherapy dosing calculations congruent. I have reviewed the above chemotherapy order and that my calculation of the final dose and BSA (when applicable) corroborate those calculations of the  pharmacist.     Ubaldo Rodriguez, PharmD, BCPS

## 2023-01-20 NOTE — PROGRESS NOTES
Pt to [facility] for lab draw, doctors office visit, and chemotherapy administration.      Afebrile.  VSS.  Awake and alert in no acute distress.      Port accessed using a 22g 3/4 inch trevino needle with 1 attempt by MONTSERRAT Euceda (Peds ER).     Pt tolerated well.       Office visit completed with Dr Duenas.  OK to proceed with chemo.  Chemotherapy dosage calculated independently by Huong Foster RN and Dayna Damian RN and compared to road map for protocol [id number].  Calculations within 10% of written order.  Lab results reviewed.      Premedications and chemo given as ordered, see MAR.  Blood return verified prior to, during, and after chemotherapy infusion.  See Chemotherapy flowsheet.  PT tolerated well.  No side effects or complications noted.  Port flushed per orders (see MAR) and de-accessed after completion. PT home with mom.  Will return for next visit on 01/24/23.

## 2023-01-20 NOTE — PROGRESS NOTES
Late entry-   Pt to [facility] for lab draw, doctors office visit, and chemotherapy administration.      Afebrile.  VSS.  Awake and alert in no acute distress.      Port accessed using a 22g 3/4 inch trevino needle with 1 attempt.    Pt tolerated well.      Chemotherapy dosage calculated independently by Huong Foster, MONTSERRAT and Tammie Covington RN and compared to road map for protocol [id number].  Calculations within 10% of written order.  Lab results reviewed.      Premedications and chemo given as ordered, see MAR.  Blood return verified prior to, during, and after chemotherapy infusion.  See Chemotherapy flowsheet.  PT tolerated well.  No side effects or complications noted.  Port flushed per orders (see MAR) and de-accessed after completion. PT discharged home .  Will return for next visit on 01/20/23.

## 2023-01-24 ENCOUNTER — HOSPITAL ENCOUNTER (OUTPATIENT)
Dept: INFUSION CENTER | Facility: MEDICAL CENTER | Age: 23
End: 2023-01-24
Attending: PEDIATRICS
Payer: COMMERCIAL

## 2023-01-24 VITALS
SYSTOLIC BLOOD PRESSURE: 106 MMHG | BODY MASS INDEX: 22.25 KG/M2 | HEART RATE: 98 BPM | HEIGHT: 66 IN | WEIGHT: 138.45 LBS | OXYGEN SATURATION: 100 % | DIASTOLIC BLOOD PRESSURE: 58 MMHG | TEMPERATURE: 98.6 F | RESPIRATION RATE: 20 BRPM

## 2023-01-24 DIAGNOSIS — Z51.11 ENCOUNTER FOR CHEMOTHERAPY MANAGEMENT: ICD-10-CM

## 2023-01-24 DIAGNOSIS — D64.81 ANEMIA ASSOCIATED WITH CHEMOTHERAPY: ICD-10-CM

## 2023-01-24 DIAGNOSIS — C91.01 ACUTE LYMPHOBLASTIC LEUKEMIA (ALL) IN REMISSION (HCC): ICD-10-CM

## 2023-01-24 DIAGNOSIS — T45.1X5A ANEMIA ASSOCIATED WITH CHEMOTHERAPY: ICD-10-CM

## 2023-01-24 DIAGNOSIS — C91.Z0 B LYMPHOBLASTIC LEUKEMIA WITH T(9;22)(Q34;Q11.2);BCR-ABL1 (HCC): ICD-10-CM

## 2023-01-24 LAB
ALBUMIN SERPL BCP-MCNC: 3.9 G/DL (ref 3.2–4.9)
ALBUMIN/GLOB SERPL: 1.6 G/DL
ALP SERPL-CCNC: 98 U/L (ref 30–99)
ALT SERPL-CCNC: 14 U/L (ref 2–50)
ANION GAP SERPL CALC-SCNC: 12 MMOL/L (ref 7–16)
AST SERPL-CCNC: 17 U/L (ref 12–45)
BASOPHILS # BLD AUTO: 0.9 % (ref 0–1.8)
BASOPHILS # BLD: 0.01 K/UL (ref 0–0.12)
BILIRUB SERPL-MCNC: 0.5 MG/DL (ref 0.1–1.5)
BUN SERPL-MCNC: 10 MG/DL (ref 8–22)
BURR CELLS/RBC NFR CSF MANUAL: 0 %
CALCIUM ALBUM COR SERPL-MCNC: 8.7 MG/DL (ref 8.5–10.5)
CALCIUM SERPL-MCNC: 8.6 MG/DL (ref 8.5–10.5)
CHLORIDE SERPL-SCNC: 102 MMOL/L (ref 96–112)
CLARITY CSF: CLEAR
CO2 SERPL-SCNC: 24 MMOL/L (ref 20–33)
COLOR CSF: COLORLESS
COLOR SPUN CSF: COLORLESS
CREAT SERPL-MCNC: 0.43 MG/DL (ref 0.5–1.4)
CYTOLOGY REG CYTOL: NORMAL
EOSINOPHIL # BLD AUTO: 0 K/UL (ref 0–0.51)
EOSINOPHIL NFR BLD: 0 % (ref 0–6.9)
ERYTHROCYTE [DISTWIDTH] IN BLOOD BY AUTOMATED COUNT: 56.1 FL (ref 35.9–50)
GFR SERPLBLD CREATININE-BSD FMLA CKD-EPI: 155 ML/MIN/1.73 M 2
GLOBULIN SER CALC-MCNC: 2.4 G/DL (ref 1.9–3.5)
GLUCOSE SERPL-MCNC: 106 MG/DL (ref 65–99)
HCT VFR BLD AUTO: 23.4 % (ref 42–52)
HGB BLD-MCNC: 7.5 G/DL (ref 14–18)
IMM GRANULOCYTES # BLD AUTO: 0.02 K/UL (ref 0–0.11)
IMM GRANULOCYTES NFR BLD AUTO: 1.7 % (ref 0–0.9)
LYMPHOCYTES # BLD AUTO: 0.11 K/UL (ref 1–4.8)
LYMPHOCYTES NFR BLD: 9.4 % (ref 22–41)
MCH RBC QN AUTO: 29 PG (ref 27–33)
MCHC RBC AUTO-ENTMCNC: 32.1 G/DL (ref 33.7–35.3)
MCV RBC AUTO: 90.3 FL (ref 81.4–97.8)
MONOCYTES # BLD AUTO: 0.04 K/UL (ref 0–0.85)
MONOCYTES NFR BLD AUTO: 3.4 % (ref 0–13.4)
NEUTROPHILS # BLD AUTO: 0.99 K/UL (ref 1.82–7.42)
NEUTROPHILS NFR BLD: 84.6 % (ref 44–72)
NRBC # BLD AUTO: 0 K/UL
NRBC BLD-RTO: 0 /100 WBC
PLATELET # BLD AUTO: 100 K/UL (ref 164–446)
PMV BLD AUTO: 8.5 FL (ref 9–12.9)
POTASSIUM SERPL-SCNC: 3.8 MMOL/L (ref 3.6–5.5)
PROT SERPL-MCNC: 6.3 G/DL (ref 6–8.2)
RBC # BLD AUTO: 2.59 M/UL (ref 4.7–6.1)
RBC # CSF: 2 CELLS/UL
SODIUM SERPL-SCNC: 138 MMOL/L (ref 135–145)
SPECIMEN VOL CSF: 2 ML
TUBE # CSF: 2
TUBE # CSF: 2
WBC # BLD AUTO: 1.2 K/UL (ref 4.8–10.8)
WBC # CSF: <1 CELLS/UL (ref 0–10)

## 2023-01-24 PROCEDURE — 88108 CYTOPATH CONCENTRATE TECH: CPT

## 2023-01-24 PROCEDURE — A4212 NON CORING NEEDLE OR STYLET: HCPCS

## 2023-01-24 PROCEDURE — 96375 TX/PRO/DX INJ NEW DRUG ADDON: CPT

## 2023-01-24 PROCEDURE — 96450 CHEMOTHERAPY INTO CNS: CPT | Performed by: PEDIATRICS

## 2023-01-24 PROCEDURE — 700101 HCHG RX REV CODE 250: Performed by: PEDIATRICS

## 2023-01-24 PROCEDURE — 36591 DRAW BLOOD OFF VENOUS DEVICE: CPT

## 2023-01-24 PROCEDURE — 80053 COMPREHEN METABOLIC PANEL: CPT

## 2023-01-24 PROCEDURE — 96409 CHEMO IV PUSH SNGL DRUG: CPT

## 2023-01-24 PROCEDURE — 96450 CHEMOTHERAPY INTO CNS: CPT

## 2023-01-24 PROCEDURE — 85025 COMPLETE CBC W/AUTO DIFF WBC: CPT

## 2023-01-24 PROCEDURE — 700111 HCHG RX REV CODE 636 W/ 250 OVERRIDE (IP): Performed by: PEDIATRICS

## 2023-01-24 PROCEDURE — 89051 BODY FLUID CELL COUNT: CPT

## 2023-01-24 RX ORDER — HEPARIN SODIUM,PORCINE 10 UNIT/ML
30 VIAL (ML) INTRAVENOUS PRN
Status: DISCONTINUED | OUTPATIENT
Start: 2023-01-24 | End: 2023-01-25 | Stop reason: HOSPADM

## 2023-01-24 RX ORDER — HEPARIN SODIUM (PORCINE) LOCK FLUSH IV SOLN 100 UNIT/ML 100 UNIT/ML
500 SOLUTION INTRAVENOUS PRN
Status: CANCELLED | OUTPATIENT
Start: 2023-01-24

## 2023-01-24 RX ORDER — ONDANSETRON 2 MG/ML
8 INJECTION INTRAMUSCULAR; INTRAVENOUS EVERY 8 HOURS PRN
Status: CANCELLED | OUTPATIENT
Start: 2023-01-24

## 2023-01-24 RX ORDER — HEPARIN SODIUM (PORCINE) LOCK FLUSH IV SOLN 100 UNIT/ML 100 UNIT/ML
500 SOLUTION INTRAVENOUS PRN
Status: DISCONTINUED | OUTPATIENT
Start: 2023-01-24 | End: 2023-01-25 | Stop reason: HOSPADM

## 2023-01-24 RX ORDER — 0.9 % SODIUM CHLORIDE 0.9 %
20 VIAL (ML) INJECTION PRN
Status: CANCELLED | OUTPATIENT
Start: 2023-01-24

## 2023-01-24 RX ORDER — DIPHENHYDRAMINE HYDROCHLORIDE 50 MG/ML
25 INJECTION INTRAMUSCULAR; INTRAVENOUS PRN
Status: CANCELLED | OUTPATIENT
Start: 2023-01-26

## 2023-01-24 RX ORDER — ONDANSETRON 2 MG/ML
8 INJECTION INTRAMUSCULAR; INTRAVENOUS ONCE
Status: CANCELLED | OUTPATIENT
Start: 2023-01-24

## 2023-01-24 RX ORDER — MIDAZOLAM HYDROCHLORIDE 1 MG/ML
2 INJECTION INTRAMUSCULAR; INTRAVENOUS ONCE
Status: COMPLETED | OUTPATIENT
Start: 2023-01-24 | End: 2023-01-24

## 2023-01-24 RX ORDER — ACETAMINOPHEN 325 MG/1
650 TABLET ORAL PRN
Status: CANCELLED | OUTPATIENT
Start: 2023-01-26

## 2023-01-24 RX ORDER — LIDOCAINE 40 MG/G
CREAM TOPICAL PRN
Status: DISCONTINUED | OUTPATIENT
Start: 2023-01-24 | End: 2023-01-25 | Stop reason: HOSPADM

## 2023-01-24 RX ORDER — ONDANSETRON 2 MG/ML
8 INJECTION INTRAMUSCULAR; INTRAVENOUS ONCE
Status: COMPLETED | OUTPATIENT
Start: 2023-01-24 | End: 2023-01-24

## 2023-01-24 RX ORDER — HEPARIN SODIUM,PORCINE 10 UNIT/ML
30 VIAL (ML) INTRAVENOUS PRN
Status: CANCELLED | OUTPATIENT
Start: 2023-01-24

## 2023-01-24 RX ORDER — PROMETHAZINE HYDROCHLORIDE 6.25 MG/5ML
0.25 SYRUP ORAL EVERY 6 HOURS PRN
Status: CANCELLED | OUTPATIENT
Start: 2023-01-24

## 2023-01-24 RX ADMIN — LIDOCAINE: 40 CREAM TOPICAL at 09:15

## 2023-01-24 RX ADMIN — MIDAZOLAM HYDROCHLORIDE 2 MG: 1 INJECTION, SOLUTION INTRAMUSCULAR; INTRAVENOUS at 11:48

## 2023-01-24 RX ADMIN — CYTARABINE 130 MG: 20 INJECTION, SOLUTION INTRATHECAL; INTRAVENOUS; SUBCUTANEOUS at 13:00

## 2023-01-24 RX ADMIN — METHOTREXATE 12 MG: 25 SOLUTION INTRA-ARTERIAL; INTRAMUSCULAR; INTRATHECAL; INTRAVENOUS at 12:07

## 2023-01-24 RX ADMIN — ONDANSETRON HYDROCHLORIDE 8 MG: 2 SOLUTION INTRAMUSCULAR; INTRAVENOUS at 11:48

## 2023-01-24 RX ADMIN — HEPARIN 500 UNITS: 100 SYRINGE at 13:15

## 2023-01-24 ASSESSMENT — FIBROSIS 4 INDEX: FIB4 SCORE: 0.49

## 2023-01-24 NOTE — H&P
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  5) Hyperphosphatemia  6) Right Lower Extremity Superficial Thrombus  7) Subsegmental Pulmonary Embolism  8) 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   Two dogs.  Everyone is well in the house. Father not involved.     Family History:     Family History             Family History   Problem Relation Age of Onset    No Known Problems Mother      Diabetes Paternal Grandfather      Hypertension Paternal Grandfather      Hyperlipidemia Paternal Grandfather      Cancer Neg Hx      Heart Disease Neg Hx      Stroke Neg Hx           No significant  "family history of cancer.  Both maternal and paternal family history of diabetes.     Immunizations:  UTD    Medications:   Current Outpatient Medications on File Prior to Encounter   Medication Sig Dispense Refill    sulfamethoxazole-trimethoprim (BACTRIM DS) 800-160 MG tablet       thioguanine (TABLOID) 40 MG tablet Take  by mouth. 16 Tablet 0    thioguanine (TABLOID) 40 MG tablet Take 2.5 tablets by mouth for 6 days of week and 3 tablets by mouth for 1 day of week (Total 14 days) 32 Tablet 0    thioguanine (TABLOID) 40 MG tablet Take 2.5 Tablets by mouth every day. 30 Tablet 3    imatinib (GLEEVEC) 100 MG tablet TAKE 2.5 TABLET BY MOUTH  EVERY EVENING (650 mg TOTAL DAILY DOSE) (Patient taking differently: TAKE 2 TABLET BY MOUTH  EVERY EVENING (600 mg TOTAL DAILY DOSE)) 90 Tablet 5    famotidine (PEPCID) 20 MG Tab Take 1 Tablet by mouth 2 times a day. 60 Tablet 0    vitamin D3 (CHOLECALCIFEROL) 1000 Unit (25 mcg) Tab Take 1 Tablet by mouth every day.      ondansetron (ZOFRAN ODT) 8 MG TABLET DISPERSIBLE Dissovle 1 Tablet by mouth every 8 hours as needed for Nausea. 20 Tablet 0    therapeutic multivitamin-minerals (THERAGRAN-M) Tab Take 1 Tablet by mouth every day.      Apixaban (ELIQUIS PO) Take  by mouth. (Patient not taking: Reported on 1/18/2023)      imatinib (GLEEVEC) 400 MG tablet TAKE 1 TABLET BY MOUTH  IN AM (650MG TOTAL DAILY DOSE) (Patient not taking: Reported on 1/24/2023) 30 Tablet 5     No current facility-administered medications on file prior to encounter.         OBJECTIVE:     Vitals:   /78   Pulse (!) 119   Temp 36.8 °C (98.2 °F) (Temporal)   Resp 20   Ht 1.685 m (5' 6.34\")   Wt 62.8 kg (138 lb 7.2 oz)   SpO2 98%     Labs:    Hospital Outpatient Visit on 01/24/2023   Component Date Value    WBC 01/24/2023 1.2 (LL)     RBC 01/24/2023 2.59 (L)     Hemoglobin 01/24/2023 7.5 (L)     Hematocrit 01/24/2023 23.4 (L)     MCV 01/24/2023 90.3     MCH 01/24/2023 29.0     [facility] 01/24/2023 32.1 (L) "     RDW 01/24/2023 56.1 (H)     Platelet Count 01/24/2023 100 (L)     MPV 01/24/2023 8.5 (L)     Neutrophils-Polys 01/24/2023 84.60 (H)     Lymphocytes 01/24/2023 9.40 (L)     Monocytes 01/24/2023 3.40     Eosinophils 01/24/2023 0.00     Basophils 01/24/2023 0.90     Immature Granulocytes 01/24/2023 1.70 (H)     Nucleated RBC 01/24/2023 0.00     Neutrophils (Absolute) 01/24/2023 0.99 (L)     Lymphs (Absolute) 01/24/2023 0.11 (L)     Monos (Absolute) 01/24/2023 0.04     Eos (Absolute) 01/24/2023 0.00     Baso (Absolute) 01/24/2023 0.01     Immature Granulocytes (a* 01/24/2023 0.02     NRBC (Absolute) 01/24/2023 0.00     Sodium 01/24/2023 138     Potassium 01/24/2023 3.8     Chloride 01/24/2023 102     Co2 01/24/2023 24     Anion Gap 01/24/2023 12.0     Glucose 01/24/2023 106 (H)     Bun 01/24/2023 10     Creatinine 01/24/2023 0.43 (L)     Calcium 01/24/2023 8.6     AST(SGOT) 01/24/2023 17     ALT(SGPT) 01/24/2023 14     Alkaline Phosphatase 01/24/2023 98     Total Bilirubin 01/24/2023 0.5     Albumin 01/24/2023 3.9     Total Protein 01/24/2023 6.3     Globulin 01/24/2023 2.4     A-G Ratio 01/24/2023 1.6     Correct Calcium 01/24/2023 8.7     GFR (CKD-EPI) 01/24/2023 155       Physical Exam:    Constitutional: Well-developed, well-nourished, and in no distress.  Very well-appearing.  HENT: Normocephalic and atraumatic. No nasal congestion or rhinorrhea. Oropharynx is clear and moist. No oral ulcerations or sores.    Eyes: Conjunctivae are normal. Pupils are equal, round.  EOMI.  Nonicteric.  Neck: Normal range of motion of neck, no adenopathy.    Cardiovascular: Normal rate, regular rhythm and normal heart sounds.  No murmur heard. DP/radial pulses 2+, cap refill < 2 sec.  No tachycardia.  Pulmonary/Chest: Effort normal and breath sounds normal. No respiratory distress. Symmetric expansion.  No crackles or wheezes.  Abdomen: Soft. Bowel sounds are normal. No distension and no mass. There is no hepatosplenomegaly.     Genitourinary:  Deferred.  Musculoskeletal: Normal range of motion of lower and upper extremities bilaterally.   Neurological: Alert and oriented to person and place. Exhibits normal muscle tone bilaterally in upper and lower extremities. Gait normal. Coordination normal.    Skin: Skin is warm, dry and pink.  No rash or evidence of skin infection.  No pallor.   Psychiatric: Mood and affect normal for age.    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, in MRD Remission:              - 2-3 weeks of symptoms              - Presenting WBC > 440 k/uL, hyperleukocytosis              - Start of Hydroxyurea (1500 mg PO Q8) 2320 on 5/27/2022  - discontinued after only 55 hours              - No steroid pretreatment              - 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              - Testicular status NEGATIVE                   - Flow cytometry of both 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, FISH positive for BCR-ABL1 translocation              - Enrolled on [facility] [id number]              - Initially enrolled on [facility] [id number] - but 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                            - Enrolled on [facility] [id number] and began study 6/13/2022              - Started imatinib therapy 6/3/2022   - End of Induction IA and IB MRD negative                        - Imatinib dose increased to 400 mg PO AM and 250 mg PM 9/29/2022              -* Note:  All imatinib doses given inpatient rounded down to 600 mg                         - Imatinib held for Septic Shock 12/27/2022-1/8/2023                         - Imatinib 600 mg PO  daily restarted 1/8/2023 inpatient                         - Imatinib 400 mg PO QAM and 250 mg PO QHS 1/14/2023-1/17/2023    - Imatinib 600 mg PO QAM 1/17/2023 - present                 - WBC 1.2, Hgb 7.5, platelets 100              - ,               - AST 17, ALT 14, total bilirubin 0.5                 - No dose-limiting toxicities.  Proceed with Day 36 therapy.     [id number], AR Arm B, Delayed Intensification, Day 36:               ** Cytarabine 130 mg IV x1 on Days 36, 37, 38 and 39              ** Methotrexate 12 mg IT today (see separate procedure note)              ** Continue thioguanine 2.5 tablets x 6 days and 3 tablets x 1 day on Days 29 through 42 (due to specialty pharmacy needing to supply medication, thioguanine to start today)   ** Continue imatinib 600 mg PO QAM             - Return to clinic for Day 37 cytarabine             2) Chemotherapy Related Pancytopenia:              - WBC 1.2, Hgb 7.5, platelets 100              - ,   - Transfuse for hemoglobin less than 7  - Transfuse for platelets less than 10         - No transfusion today however, significant drop in counts over just the course of 1 week, will continue to monitor this week.  Repeat CBC on Day 38 with possible transfusion.     3) Chemotherapy Induced Nausea and Vomiting:              - Zofran prior to chemotherapy              - Zofran and Ativan available PRN at home     4) Sixth Cranial Nerve Palsy (IMPROVED/RESOLVED):             - Followed by Dr. Carranza     5) At Risk of Opportunistic Lung Infection:             - Bactrim DS PO BID Sat and Sun      6) History Escherichia coli and Klebsiella Sepsis/Septic Shock in an Immunocompromised Host:  - Completed 16 days of cefepime following first negative culture  - Port remained in place  - Left lower extremity hematoma demonstrating many white blood cells but no growth of bacteria  - Monitor for fever and signs of recurrent infection especially as counts  drop     7) Leg Pain (GREATLY IMPROVED):            - Secondary to hematoma which has been aspirated, fluid was bloody with many white blood cells but not growing any bacterial organisms to date  - Greatly improved     8) Social:             - Continue with support             - Continue school as tolerated     9) Access:             - R Port-A-Cath in place      10) Research Participant:           [facility] - Source Data         Diagnosis: Ph+ Precursor B-Cell Acute Lymphoblastic Leukemia     Disease Status: EOI1A MRD NEGATIVE, EOI1B RD NEGATIVE, CNS3c, testicular negative, HSV1 IgG POSITIVE, CMV IgG NEGATIVE, VARICELLA IgG POSITIVE     Active Studies: [id number], [id number]                                                                                                      Inactive Studies: [id number]                                                                                                                                                Optional Studies: None             Protocol: International Phase 3 trial in [place] 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      Treatment Plan: [id number](OS), AR-Arm B, Delayed Intensification, Day 36     Height: 1.650 m      Weight: 64.2kg       BSA: 1.72 m²   (Delayed Intensification Day 29 1/17/2023)                                                                                                                                           Performance Status: Karnofsky 70, ECOG  2 (1/17/2023)     Treatment Plan Medications:  (100% adherent with imatinib and thioguanine)     Imatinib dose adjusted for weight at DI, Day 29 to Imatinib 600 mg PO daily in AM     Continue thioguanine 2.5 tablets PO x 6 days and 3 tablets PO x 1 days (total 14 days) today as obtaining medication from specialty pharmacy caused 1 day delay - to complete 1/31/2023     Evaluations /  Study Labs:  (1/24/2023)     WBC 1.2, Hgb 7.5, platelets 100  ,      AST 17, ALT 14, total bilirubin 0.5    CSF cell count and cytology PENDING    Therapy Given: (1/24/2023)     Methotrexate 12 mg IT  Cytarabine 130 mg IV x1 dose  Thioguanine 2.5 tablets PO x6 days and 3 tablets PO x1 day  Imatinib 600 mg PO QAM         Disposition: Return to clinic tomorrow for Day 37 cytarabine     Pepe Faye MD  Pediatric Hematology / Oncology  [facility]  Cell.  [phone]  [facility]. [phone]

## 2023-01-24 NOTE — PROGRESS NOTES
Pediatric Hematology / Oncology  Progress Note      Patient Name:  Tomas Jean-Baptiste  : 2000   MRN: [id number]    Location of Service:  [facility]  Date of Service: 2023  Time: 3:00 PM    Primary Care Physician: Margarito Arvizu M.D.    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 male who presents to the [facility] for scheduled chemotherapy.  Today is Day 32 of second half of Delayed Intensification. JULY presents to clinic today with his mother and provides accurate interval clinical history.      JULY is tolerating cytarabine infusion well. He reports ongoing R sided lower chest pain. Describes it as a sharp, stabbing pain which worsens on taking deep breath and gets relieved with use of heat pack. No reports of injury/hematoma. No reports of fever, shortness of breath, wheezing, chest pain or palpitations. No sick contacts. Reports Improvement in pain of lower extremities especially left lower extremity. Taking Imatinib 600 mg daily and oral thioguanine as prescribed ( 100 mg for 6 days and 120 mg for a day for total of 14 days- started on 2023)      Review of Systems:     Constitutional:  Afebrile. No remote or acute illness.  Energy and activity are much improved.  Appetite and oral intake are much improved.  HENT: Negative for auditory changes, nosebleeds and sore throat.  No mouth sores.  Eyes: Negative for visual changes.  Respiratory: R lower chest pain  Cardiovascular: Tachycardia improved greatly.  Endurance improved greatly.  Gastrointestinal: Negative for nausea, vomiting, abdominal pain, diarrhea, constipation or blood in  stool.    Genitourinary: Negative.  No blood in stool.  Musculoskeletal: Negative for joint pain.  Does complain of left calf pain at the site of hematoma but improving  Skin: Negative for rash, signs of infection.  Neurological: Negative for numbness, tingling, sensory changes, weakness or headaches.    Endo/Heme/Allergies: Does not bruise/bleed easily.    Psychiatric/Behavioral: No changes in mood, appropriate for age.     PAST MEDICAL HISTORY:     Oncologic History:  2-3 week history of worsening fatigue, right lower extremity pain  Presentation to OS and diagnosed with right LE superficial thrombus, subsegmental PE and hyperleukocytosis, anemia and thrombocytopenia  Transferred to [facility]  Presenting (local) WBC > 440K, Hgb 10.0, platelets 53, (automated differential ANC 3190, ALC 75,310, absolute monocyte count 59696, absolute blast count 340,560)  Uric Acid 15.6, phosphorus 5.6, LDH 1114  Rasburicase x 1 dose given   Peripheral Blood flow cytometry demonstrating CD10 pos, CD19 pos, CD20 neg, CD22 dim (60%) 5/28/2022  Peripheral blood FISH for BCR-ABL1 positive in 98% of analyzed cells     Age at Diagnosis: 21 years  White Blood Cell Count at Presentation: > 440 k/uL  Testicular Disease Status: Negative (see procedure note 5/30/2022)  CNS Status: 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, diagnostic LP with WBC 1, RBC 3 and no evidence of leukemic blasts 5/30/2022  Steroid Pre-treatment: None  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     All inclusion/exclusion criteria for [id number] met and consent signed - enrolled 5/29/2022   All inclusion/exclusion criteria for [id number] met and consent signed - enrolled 5/30/2022  Confirmatory bone marrow aspirate and biopsy and diagnostic LP + cytarabine 70 mg IT 5/30/2022  Induction therapy (ON STUDY [id number]) started 5/30/2022  Bone marrow immunohistochemistry consistent with  diagnosis of B-ALL comprising 90% of marrow cellularity  Bone marrow sample [facility] to [facility] purposes:  Flow cytom  Of the blood pressure little high that is a problem is a cultural problem is well and cultural genetic and everything else like that unfortunately breathalyzers such bad heart disease diabetes things like that  populations etry consistent with peripheral blood, cytogenetics remarkable for known t(9;22)  CSF with WBC 1, RBC 3, no blasts identified on cytospin  FISH results available 5/31/2022 making patient eligible for transfer from [place] [address] to [place] [address] as eligibility requirements were met for [place] [address]  Patient unenrolled from [place] [address] (BCR-ABL1 fusion positive) 6/1/2022  Consent signed for [place] [address] and patient enrolled 6/1/2022 (see eligibility checklist from 5/31/2022 and consent note from 6/1/2022)  Imatinib 400 mg PO QAM / 200 mg PO QPM started 6/3/2022 (allowed per [place] [address])  Patient completed the first 15 days of a Standard 4-drug Induction on 6/13/2022  Start of [place] [address](OS), Induction IA Part 2, Day 15 6/13/2022  End of Induction 1A Part 2 - MRD negative  Start of [place] [address](OS), Induction IA Part 2, Day 15 7/5/2022  Induction IB DELAYED 2 weeks 14 days from 7/26/2022-8/9/2022) for myelosuppression - Start of Day 22 cytarabine block 8/9/2022  Induction IB Day 42 delayed 9 days for myelosuppression - Day 42 evaluations 9/7/2022  End of Induction IB - Flow cytometric MRD negative, MRD by IgH-TCR PCR [id number]%  Randomization to AR-Experimental Arm B of [place] [address]  Start of AR-Experimental Arm B, Interim Maintenance 9/29/2022  Weight based increase in dose of imatinib to 400 mg PO AM and 250 mg PM 9/29/2022  Thrombocytopenia not permissive of proceeding with Day 15 of Interim Maintenance  AR-Experimental Arm B, Interim Maintenance, Day 15 delayed 4 days, start 10/17/2022  AR-Experimental Arm B, Interim Maintenance, Day 29, start 11/1/2022  AR-Experimental Arm B, Interim  Maintenance, Day 43, start 11/14/2022  Last does of 6-MP 11/27/2022  AR-Experimental Arm B, Delayed Intensification, Day 1, start 12/6/2022  Admission with Severe Septic Shock 12/27/2022  Imatinib HELD 12/27/2022-1/8/2023  AR-Experimental Arm B, Delayed Intensification, Day 29 DELAYED 14 day with start 1/17/2023      Past Medical History:    1) Previously Healthy  2) 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  3) Hyperleukocytosis  4) Hyperuricemia  5) Hyperphosphatemia  6) Right Lower Extremity Superficial Thrombus  7) Subsegmental Pulmonary Embolism  8) 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   Two dogs.  Everyone is well in the house. Father not involved.     Family History:     Family History             Family History   Problem Relation Age of Onset    No Known Problems Mother      Diabetes Paternal Grandfather      Hypertension Paternal Grandfather      Hyperlipidemia Paternal Grandfather      Cancer Neg Hx      Heart Disease Neg Hx      Stroke Neg Hx           No significant family history of cancer.  Both maternal and paternal family history of diabetes.     Immunizations:  UTD    Medications:   Current Outpatient Medications on File Prior to Encounter   Medication Sig Dispense Refill    sulfamethoxazole-trimethoprim (BACTRIM DS) 800-160 MG tablet       thioguanine (TABLOID) 40 MG tablet Take  by mouth. 16 Tablet 0    imatinib (GLEEVEC) 400 MG tablet TAKE 1 TABLET BY MOUTH  IN AM (650MG TOTAL DAILY DOSE) 30 Tablet 5    famotidine (PEPCID) 20 MG  "Tab Take 1 Tablet by mouth 2 times a day. 60 Tablet 0    vitamin D3 (CHOLECALCIFEROL) 1000 Unit (25 mcg) Tab Take 1 Tablet by mouth every day.      ondansetron (ZOFRAN ODT) 8 MG TABLET DISPERSIBLE Dissovle 1 Tablet by mouth every 8 hours as needed for Nausea. 20 Tablet 0    therapeutic multivitamin-minerals (THERAGRAN-M) Tab Take 1 Tablet by mouth every day.      Apixaban (ELIQUIS PO) Take  by mouth. (Patient not taking: Reported on 1/18/2023)      thioguanine (TABLOID) 40 MG tablet Take 2.5 tablets by mouth for 6 days of week and 3 tablets by mouth for 1 day of week (Total 14 days) 32 Tablet 0    thioguanine (TABLOID) 40 MG tablet Take 2.5 Tablets by mouth every day. 30 Tablet 3    imatinib (GLEEVEC) 100 MG tablet TAKE 2.5 TABLET BY MOUTH  EVERY EVENING (650 mg TOTAL DAILY DOSE) (Patient taking differently: TAKE 2 TABLET BY MOUTH  EVERY EVENING (600 mg TOTAL DAILY DOSE)) 90 Tablet 5       OBJECTIVE:     Vitals:   /85   Pulse (!) 110   Temp 37.8 °C (100 °F) (Temporal)   Resp 20   Ht 1.65 m (5' 4.96\")   Wt 62.3 kg (137 lb 5.6 oz)   SpO2 98%     Labs: None today  Hospital Outpatient Visit on 01/17/2023   Component Date Value    WBC 01/17/2023 3.3 (L)     RBC 01/17/2023 3.21 (L)     Hemoglobin 01/17/2023 9.2 (L)     Hematocrit 01/17/2023 29.0 (L)     MCV 01/17/2023 90.3     MCH 01/17/2023 28.7     MCHC 01/17/2023 31.7 (L)     RDW 01/17/2023 56.3 (H)     Platelet Count 01/17/2023 158 (L)     MPV 01/17/2023 9.5     Neutrophils-Polys 01/17/2023 61.00     Lymphocytes 01/17/2023 24.50     Monocytes 01/17/2023 13.60 (H)     Eosinophils 01/17/2023 0.00     Basophils 01/17/2023 0.30     Immature Granulocytes 01/17/2023 0.60     Nucleated RBC 01/17/2023 1.20     Neutrophils (Absolute) 01/17/2023 2.02     Lymphs (Absolute) 01/17/2023 0.81 (L)     Monos (Absolute) 01/17/2023 0.45     Eos (Absolute) 01/17/2023 0.00     Baso (Absolute) 01/17/2023 0.01     Immature Granulocytes (a* 01/17/2023 0.02     NRBC (Absolute) " 01/17/2023 0.04     Sodium 01/17/2023 138     Potassium 01/17/2023 3.9     Chloride 01/17/2023 106     Co2 01/17/2023 22     Anion Gap 01/17/2023 10.0     Glucose 01/17/2023 84     Bun 01/17/2023 8     Creatinine 01/17/2023 0.77     Calcium 01/17/2023 8.5     AST(SGOT) 01/17/2023 19     ALT(SGPT) 01/17/2023 27     Alkaline Phosphatase 01/17/2023 91     Total Bilirubin 01/17/2023 0.4     Albumin 01/17/2023 3.5     Total Protein 01/17/2023 6.0     Globulin 01/17/2023 2.5     A-G Ratio 01/17/2023 1.4     Color 01/17/2023 Yellow     Character 01/17/2023 Clear     Specific Gravity 01/17/2023 1.024     Ph 01/17/2023 6.0     Glucose 01/17/2023 Negative     Ketones 01/17/2023 Trace (A)     Protein 01/17/2023 30 (A)     Bilirubin 01/17/2023 Negative     Urobilinogen, Urine 01/17/2023 1.0     Nitrite 01/17/2023 Negative     Leukocyte Esterase 01/17/2023 Negative     Occult Blood 01/17/2023 Negative     Micro Urine Req 01/17/2023 Microscopic     WBC 01/17/2023 2-5 (A)     RBC 01/17/2023 0-2 (A)     Bacteria 01/17/2023 Negative     Epithelial Cells 01/17/2023 Negative     Mucous Threads 01/17/2023 Few     Ca Oxalate Crystal 01/17/2023 Rare     Hyaline Cast 01/17/2023 6-10 (A)     Correct Calcium 01/17/2023 8.9     GFR (CKD-EPI) 01/17/2023 130     Color 01/17/2023 Yellow     Character 01/17/2023 Clear     Specific Gravity 01/17/2023 1.006     Ph 01/17/2023 6.0     Glucose 01/17/2023 Negative     Ketones 01/17/2023 Negative     Protein 01/17/2023 Negative     Bilirubin 01/17/2023 Negative     Urobilinogen, Urine 01/17/2023 0.2     Nitrite 01/17/2023 Negative     Leukocyte Esterase 01/17/2023 Negative     Occult Blood 01/17/2023 Negative     Micro Urine Req 01/17/2023 see below       Physical Exam:    Constitutional: Well-developed, well-nourished, and in no distress.  Well-appearing.  HENT: Normocephalic and atraumatic. No nasal congestion or rhinorrhea. Oropharynx is clear and moist. No oral ulcerations or sores.    Eyes:  Conjunctivae are normal. Pupils are equal, round.  EOMI.  Nonicteric.  Neck: Normal range of motion of neck, no adenopathy.    Cardiovascular: Normal rate, regular rhythm and normal heart sounds.  No murmur heard. DP/radial pulses 2+, cap refill < 2 sec.  Pulmonary/Chest: Effort restricted due to pain. No respiratory distress. Breath sounds difficult to assess given patient not able to inhale well.  No crackles or wheezes.  Abdomen: Soft. Bowel sounds are normal. No distension and no mass. There is no hepatosplenomegaly.    Genitourinary:  Deferred.  Musculoskeletal: Normal range of motion of lower and upper extremities bilaterally.  Continues to have point tenderness over the left lateral aspect of calf.  Right calf without tenderness.  Gait as below.    Neurological: Alert and oriented to person and place. Exhibits normal muscle tone bilaterally in upper and lower extremities.  Able to ambulate without assistant, significant limp favoring the left leg.  Does not put full weight on left leg given pain.  Skin: Skin is warm, dry and pink.  No rash or evidence of skin infection.  No pallor.   Psychiatric: Mood and affect normal for age.    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 and whose End of Induction IB MRD was both negative by flow cytometry and PCR who presents for Delayed Intensification, Day 32 therapy    R lower chest pain consistent with musculoskeletal pain. Encouraged using heat pack and mother mentioned wanting to use icy hot . Patient not using tylenol or pain medications currently.       1) Ph+ Precursor B-Cell Acute Lymphoblastic Leukemia, in MRD Remission:              - 2-3 weeks of symptoms              - Presenting WBC > 440 k/uL, hyperleukocytosis              - Start of Hydroxyurea (1500 mg PO Q8) 2320 on 5/27/2022  - discontinued after only 55 hours              - No steroid  pretreatment              - 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              - Testicular status NEGATIVE                   - Flow cytometry of both 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, FISH positive for BCR-ABL1 translocation              - Enrolled on [facility] [id number]              - Initially enrolled on [facility] [id number] - but 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                            - Enrolled on [facility] [id number] and began study 6/13/2022              - Started imatinib therapy 6/3/2022   - End of Induction IA and IB MRD negative             - Imatinib dose increased to 400 mg PO AM and 250 mg PM 9/29/2022   -* Note:  All imatinib doses given inpatient rounded down to 600 mg              - Imatinib held for Septic Shock 12/27/2022-1/8/2023              - Imatinib 600 mg PO daily restarted 1/8/2023 inpatient     - WBC 3300/microliters, Hgb 9.2 gm/dL, platelets 158,000/microliters on 1/17/2023   - ANC 2020/microliters, /microliters On 1/17/2023   - AST 19 U/L, ALT 27 U/L and total bilirubin 0.4 mg/dL    [place] [address], AR Arm B, Delayed Intensification, Day 32 (Start of day 29 therapy DELAYED by 2 weeks FOR SEVERE SEPTIC SHOCK):     ** Cyclophosphamide 1860 mg IV x1 on Day 29 (COMPLETE)   ** Cytarabine 130 mg IV x1 on Days 29-31 (COMPLETE) and 32 (To be given today)   ** Methotrexate 12 mg IT to be given on Day 30 as patient had taken apixaban (COMPLETE)   ** Begin thioguanine 2.5 tablets x 6 days and 3 tablets x 1 day on Days 29 through 42, started on Day 30 1/18/2023             - Return to clinic for Day 36 for Cyatarbine infusion             2) Chemotherapy Related Pancytopenia:              - WBC 3300/microliters, Hgb 9.2 gm/dL, platelets 158,000/microliters on 1/17/2023   - ANC 2020/microliters, /microliters On 1/17/2023  - Transfuse for hemoglobin less than 7 gm/dL or with symptoms  - Transfuse for platelets less than 10,000/microliters or  with symptoms  - CBC not obtained but no concerns.     3) Chemotherapy Induced Nausea and Vomiting:   - Zofran prior to chemotherapy   - Zofran and Ativan available PRN at home     4) Sixth Cranial Nerve Palsy (IMPROVED/RESOLVED):             - Followed by Dr. Carranza     5) At Risk of Opportunistic Lung Infection:             - Bactrim DS PO BID Sat and Sun for PJP prophylaxis     6) History Escherichia coli and Klebsiella Sepsis/Septic Shock in an Immunocompromised Host:  - Completed 16 days of cefepime following first negative culture  - Port remained in place  - Left lower extremity hematoma demonstrating many white blood cells but no growth to date, will continue to follow  - Monitor for fever and signs of recurrent infection especially as counts drop    7) Leg Pain:           - Secondary to hematoma which has been aspirated, fluid was bloody with many white blood cells but not growing any bacterial organisms to date    8) R sided chest pain: Likely MSK  - Continue heat pack/application of topical cream for pain relief  - Will continue to monitor     9) Social:             - Continue with support             - Continue school as tolerated    10) Access:             - R Port-A-Cath in place      11) At Risk for Deep Venous Thrombosis (RESOLVED):           -Ambulating with relative frequency, discontinue apixaban     12) Research Participant:           [facility] - Source Data         Diagnosis: Ph+ Precursor B-Cell Acute Lymphoblastic Leukemia     Disease Status: EOI1A MRD NEGATIVE, EOI1B RD NEGATIVE, CNS3c, testicular negative, HSV1 IgG POSITIVE, CMV IgG NEGATIVE, VARICELLA IgG POSITIVE     Active Studies: [id number], [id number]                                                                                                      Inactive Studies: [id number]                                                                                                                                                 Optional Studies: None             Protocol: International Phase 3 trial in [place] 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      Treatment Plan: [id number](OS), AR-Arm B, Delayed Intensification, Day 32 (Start of DAY 29 therapy DELAYED 2 WEEKS FOR SEVERE SEPTIC SHOCK)     Height: 1.650 m      Weight: 64.2kg       BSA: 1.72 m²   (Delayed Intensification Day 29 1/17/2023)                                                                                                                                           Performance Status: Karnofsky 70, ECOG  2 (1/17/2023)     Evaluations / Study Labs:  1/20/2023:     None    Therapy Given: 1/20/2023     Cytarabine 130 mg IV x1 dose  Continue thioguanine 2.5 tablets PO x6 days and 3 tablets PO x1 day  Continue imatinib 600 mg PO QAM         Disposition: Return to clinic for Day 36 cytarabine infusion on 1/24/2023    Alyce Duenas MD  Pediatric Hematology / Oncology  [facility]  Cell.  [phone]  [facility] [phone]

## 2023-01-24 NOTE — PROGRESS NOTES
"Pharmacy Chemotherapy Verification Note:    Dx: Ph+ B-ALL        Protocol: Delayed Intensification Part 2 per [id number] (On Study Arm B, ID number: [id number])         Allergies:  Amoxicillin     /58   Pulse 98   Temp 37 °C (98.6 °F) (Temporal)   Resp 20   Ht 1.685 m (5' 6.34\")   Wt 62.8 kg (138 lb 7.2 oz)   SpO2 100%   BMI 22.12 kg/m²  Body surface area is 1.71 meters squared.  Weight Type Height Weight BSA   Treatment plan 165 cm 64.2 kg 1.72 m²     Labs from 1/24/23 reviewed - all within treatment parameters.      Drug Order   (Drug name, dose, route, IV Fluid & volume, frequency, number of doses) Cycle: Delayed Intensification D 36  *delayed for infection/hospitalization  Previous treatment: DI D32 1/20/23   Medication = cytarabine  Base Dose = 75mg/m2mg/m2  Calc Dose: Base Dose x 1.71m2 = 128.3mg  Final Dose = 130mg  Route = IV  Fluid & Volume = in syringe(20mg/mL)6.5mL  Admin Duration = Over 5 min   Days 29-32 and 36-39    <10% difference, ok to treat with final dose   Medication = cyclophosphamide  Base Dose = 1000mg/m2  Calc Dose:Base Dose x 1.72m2 = 1720mg  Final Dose = ---mg  Route = IV  Fluid & Volume = NS 250mL  Admin Duration = Over 30min   Day 29      <10% difference, okay to treat with final dose   Medication = methotrexate  Base Dose = 12mg fixed dose  Calc Dose: n/a  Final Dose = 12mg  Route = INTRATHECAL  Fluid & Volume = PF NS 6 mL  Admin Duration = IT per MD   Day 29 and 36      No calculation required  okay to treat with final dose     By my signature below, I confirm this process was performed independently with the BSA and all final chemotherapy dosing calculations congruent. I have reviewed the above chemotherapy order and that my calculation of the final dose and BSA (when applicable) corroborate those calculations of the  pharmacist.     JAE Wilson, Pharm.D.    "

## 2023-01-24 NOTE — PROGRESS NOTES
[facility], Doctor visit, lumbar puncture for IT Chemotherapy and for IV Chemotherapy.     Afebrile.  VSS.  Awake and alert in no acute distress.  Port accessed with 22G 3/4 inch with 1 attempt. Labs drawn from the port without difficulty.  Pt tolerated well.      Visit with Dr. Faye  completed. Okay to proceed with chemotherapy and LP today.    Labs reviewed and pre-medications given per MD orders, see MAR.     Port patency verified prior to procedure.  Procedure performed by Dr. Faye.      Procedure Time out Time: 1159    LP completed at 1209.       See MAR for medication adminsitration.  No unexpected events.      Pt remained awake without complications.     Chemotherapy dosage calculated independently by Dayna Damian, MONTSERRAT and Mary Saravia RN and compared to road map for protocol [id number] ArmB.  Calculations within 10% of written order.     Chemotherapy given as ordered, see MAR.  Blood return verified prior to, during, and after chemotherapy infusion.  See Chemotherapy flowsheet.  Pt tolerated well.  No side effects or complications noted.     Pt remained supine for one hour post LP.  Pt tolerated regular diet and ambulated independently. Port flushed per orders (see MAR) and de-accessed.  Discharged home with self once discharge criteria met.

## 2023-01-24 NOTE — PROGRESS NOTES
"Pharmacy Chemotherapy Calculations Note:    Dx: B-ALL, PH+    Cycle: Delayed Intensification Days 36-39  Previous treatment: D29-32 on 1/17-1/20/23    On Study - [id number] arm B COG ID number: [id number]             /58   Pulse 98   Temp 37 °C (98.6 °F) (Temporal)   Resp 20   Ht 1.685 m (5' 6.34\")   Wt 62.8 kg (138 lb 7.2 oz)   SpO2 100%   BMI 22.12 kg/m²  Body surface area is 1.71 meters squared.  Treatment plan 165 cm 75.5 kg 1.86 m²   MD would like to proceed with tx based on 1.86 m2 BSA    Labs from 1/24/23 reviewed - all within treatment parameters.        INTRATHECAL Methotrexate PF 12 mg fixed dose for age   Ok for final dose = 12 mg IT on Day 36    Cytarabine 75 mg/m² x 1.71 m² = 128.3 mg   <10% difference, okay to treat with final dose = 130 mg      Judy Bryant, PharmD, BCOP              "

## 2023-01-24 NOTE — PROCEDURES
Pediatric Oncology Lumbar Puncture  Procedure Note      Patient Name:  Tomas Jean-Baptiste  : 2000   MRN: [id number]    Service Location:  [facility]  Date of Service: 2023  Time: 12:00 PM    Procedure Performed By: Pepe Faye M.D.    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  He presents today for scheduled chemotherapy, Delayed Intensification Day 36 and scheduled lumbar puncture with intrathecal chemotherapy.  Prior to the procedure, the risks and benefits were discussed with the patient.  Consent for the procedure was signed and placed in the patient's chart.  All pertinent labs and history were reviewed and a complete History and Physical Examination were performed and placed in the medical record.  Tomas Roy was then taken to the procedure room where the procedure was performed.  All necessary safety equipment per ASA guidelines were available.  A time-out was performed and the patient identified by name,  and medical record number.  Gloves and mask were worn during the entire procedure.  Tomas Roy was prepped and draped in the usual sterile fashion with povoiodine.  He was positioned in the left decubitus position and all landmarks including  superior posterior iliac crest, umbilicus and vertebral bodies were identified by palpation.  A 3.5 in, 22 gauge spinal needle was introduced into the L3-L4 spinal interspace.  6 ml of clear fluid were obtained and sent for cell count and cytology.  Methotrexate 12 mg in 6 mL of NS was verified with the nurse bedside and then then administered into the spinal fluid. Tomas Roy tolerated the procedure without complication or bleeding.      Results:    PENDING    Pepe Faye MD  Pediatric Hematology / Oncology  [facility]  Cell.  [phone]

## 2023-01-25 ENCOUNTER — HOSPITAL ENCOUNTER (OUTPATIENT)
Dept: INFUSION CENTER | Facility: MEDICAL CENTER | Age: 23
End: 2023-01-25
Attending: PEDIATRICS
Payer: COMMERCIAL

## 2023-01-25 VITALS
DIASTOLIC BLOOD PRESSURE: 71 MMHG | TEMPERATURE: 99.2 F | OXYGEN SATURATION: 95 % | HEIGHT: 65 IN | SYSTOLIC BLOOD PRESSURE: 124 MMHG | WEIGHT: 139.55 LBS | BODY MASS INDEX: 23.25 KG/M2 | HEART RATE: 113 BPM | RESPIRATION RATE: 20 BRPM

## 2023-01-25 DIAGNOSIS — C91.01 ACUTE LYMPHOBLASTIC LEUKEMIA (ALL) IN REMISSION (HCC): ICD-10-CM

## 2023-01-25 DIAGNOSIS — T45.1X5A ANEMIA ASSOCIATED WITH CHEMOTHERAPY: ICD-10-CM

## 2023-01-25 DIAGNOSIS — Z51.11 ENCOUNTER FOR CHEMOTHERAPY MANAGEMENT: ICD-10-CM

## 2023-01-25 DIAGNOSIS — C91.Z0 B LYMPHOBLASTIC LEUKEMIA WITH T(9;22)(Q34;Q11.2);BCR-ABL1 (HCC): ICD-10-CM

## 2023-01-25 DIAGNOSIS — D64.81 ANEMIA ASSOCIATED WITH CHEMOTHERAPY: ICD-10-CM

## 2023-01-25 LAB
ABO GROUP BLD: NORMAL
BLD GP AB SCN SERPL QL: NORMAL
RH BLD: NORMAL

## 2023-01-25 PROCEDURE — 99213 OFFICE O/P EST LOW 20 MIN: CPT | Performed by: PEDIATRICS

## 2023-01-25 PROCEDURE — 96409 CHEMO IV PUSH SNGL DRUG: CPT

## 2023-01-25 PROCEDURE — 700111 HCHG RX REV CODE 636 W/ 250 OVERRIDE (IP): Performed by: PEDIATRICS

## 2023-01-25 PROCEDURE — 86901 BLOOD TYPING SEROLOGIC RH(D): CPT

## 2023-01-25 PROCEDURE — 96375 TX/PRO/DX INJ NEW DRUG ADDON: CPT

## 2023-01-25 PROCEDURE — 86850 RBC ANTIBODY SCREEN: CPT

## 2023-01-25 PROCEDURE — 86900 BLOOD TYPING SEROLOGIC ABO: CPT

## 2023-01-25 PROCEDURE — 36591 DRAW BLOOD OFF VENOUS DEVICE: CPT

## 2023-01-25 PROCEDURE — A4212 NON CORING NEEDLE OR STYLET: HCPCS

## 2023-01-25 RX ORDER — 0.9 % SODIUM CHLORIDE 0.9 %
20 VIAL (ML) INJECTION PRN
Status: CANCELLED | OUTPATIENT
Start: 2023-01-25

## 2023-01-25 RX ORDER — DIPHENHYDRAMINE HYDROCHLORIDE 50 MG/ML
25 INJECTION INTRAMUSCULAR; INTRAVENOUS PRN
Status: CANCELLED | OUTPATIENT
Start: 2023-01-25

## 2023-01-25 RX ORDER — HEPARIN SODIUM (PORCINE) LOCK FLUSH IV SOLN 100 UNIT/ML 100 UNIT/ML
500 SOLUTION INTRAVENOUS PRN
Status: CANCELLED | OUTPATIENT
Start: 2023-01-25

## 2023-01-25 RX ORDER — HEPARIN SODIUM,PORCINE 10 UNIT/ML
30 VIAL (ML) INTRAVENOUS PRN
Status: DISCONTINUED | OUTPATIENT
Start: 2023-01-25 | End: 2023-01-26 | Stop reason: HOSPADM

## 2023-01-25 RX ORDER — ACETAMINOPHEN 325 MG/1
650 TABLET ORAL PRN
Status: CANCELLED | OUTPATIENT
Start: 2023-01-25

## 2023-01-25 RX ORDER — ONDANSETRON 2 MG/ML
8 INJECTION INTRAMUSCULAR; INTRAVENOUS EVERY 8 HOURS PRN
Status: CANCELLED | OUTPATIENT
Start: 2023-01-25

## 2023-01-25 RX ORDER — PROMETHAZINE HYDROCHLORIDE 6.25 MG/5ML
0.25 SYRUP ORAL EVERY 6 HOURS PRN
Status: CANCELLED | OUTPATIENT
Start: 2023-01-25

## 2023-01-25 RX ORDER — ONDANSETRON 2 MG/ML
8 INJECTION INTRAMUSCULAR; INTRAVENOUS ONCE
Status: COMPLETED | OUTPATIENT
Start: 2023-01-25 | End: 2023-01-25

## 2023-01-25 RX ORDER — HEPARIN SODIUM (PORCINE) LOCK FLUSH IV SOLN 100 UNIT/ML 100 UNIT/ML
500 SOLUTION INTRAVENOUS PRN
Status: DISCONTINUED | OUTPATIENT
Start: 2023-01-25 | End: 2023-01-26 | Stop reason: HOSPADM

## 2023-01-25 RX ORDER — HEPARIN SODIUM,PORCINE 10 UNIT/ML
30 VIAL (ML) INTRAVENOUS PRN
Status: CANCELLED | OUTPATIENT
Start: 2023-01-25

## 2023-01-25 RX ORDER — ONDANSETRON 2 MG/ML
8 INJECTION INTRAMUSCULAR; INTRAVENOUS ONCE
Status: CANCELLED | OUTPATIENT
Start: 2023-01-25

## 2023-01-25 RX ADMIN — HEPARIN 500 UNITS: 100 SYRINGE at 15:45

## 2023-01-25 RX ADMIN — CYTARABINE 130 MG: 20 INJECTION, SOLUTION INTRATHECAL; INTRAVENOUS; SUBCUTANEOUS at 15:29

## 2023-01-25 RX ADMIN — ONDANSETRON HYDROCHLORIDE 8 MG: 2 SOLUTION INTRAMUSCULAR; INTRAVENOUS at 15:18

## 2023-01-25 ASSESSMENT — FIBROSIS 4 INDEX: FIB4 SCORE: 0.95

## 2023-01-25 NOTE — PROGRESS NOTES
"Pharmacy Chemotherapy Verification Note:    Dx: Ph+ B-ALL        Protocol: Delayed Intensification Part 2 per [id number] (On Study Arm B, ID number: [id number])         Allergies:  Amoxicillin     /71   Pulse (!) 111   Temp 37.3 °C (99.1 °F) (Temporal)   Resp 20   Ht 1.654 m (5' 5.12\")   Wt 63.3 kg (139 lb 8.8 oz)   SpO2 95%   BMI 23.14 kg/m²  Body surface area is 1.71 meters squared.  Weight Type Height Weight BSA   Treatment plan 165 cm 64.2 kg 1.72 m²     Labs from 1/24/23 reviewed - all within treatment parameters.      Drug Order   (Drug name, dose, route, IV Fluid & volume, frequency, number of doses) Cycle: Delayed Intensification D37  *delayed for infection/hospitalization  Previous treatment: DI D32 1/20/23   Medication = cytarabine  Base Dose = 75 mg/m2  Calc Dose: Base Dose x 1.71 m2 = 128.3 mg  Final Dose = 130 mg  Route = IV  Fluid & Volume = in syringe (20mg/mL) 6.5mL  Admin Duration = Over 5 min   Days 36-39    <10% difference, ok to treat with final dose     By my signature below, I confirm this process was performed independently with the BSA and all final chemotherapy dosing calculations congruent. I have reviewed the above chemotherapy order and that my calculation of the final dose and BSA (when applicable) corroborate those calculations of the  pharmacist.     Judy Bryant, PharmD, BCOP      "

## 2023-01-25 NOTE — PROGRESS NOTES
Eliana from Lab called with critical result of WBC 1.2 at 1047. Critical lab result read back to Eliana.   Dr. Faye notified of critical lab result at 1100.  Critical lab result read back by Dr. Faye.

## 2023-01-26 ENCOUNTER — HOSPITAL ENCOUNTER (OUTPATIENT)
Dept: INFUSION CENTER | Facility: MEDICAL CENTER | Age: 23
End: 2023-01-26
Attending: PEDIATRICS
Payer: COMMERCIAL

## 2023-01-26 VITALS
RESPIRATION RATE: 18 BRPM | TEMPERATURE: 99.2 F | DIASTOLIC BLOOD PRESSURE: 82 MMHG | HEART RATE: 106 BPM | OXYGEN SATURATION: 98 % | SYSTOLIC BLOOD PRESSURE: 121 MMHG | HEIGHT: 65 IN | BODY MASS INDEX: 23.32 KG/M2 | WEIGHT: 139.99 LBS

## 2023-01-26 DIAGNOSIS — C91.Z0 B LYMPHOBLASTIC LEUKEMIA WITH T(9;22)(Q34;Q11.2);BCR-ABL1 (HCC): ICD-10-CM

## 2023-01-26 DIAGNOSIS — Z51.11 ENCOUNTER FOR CHEMOTHERAPY MANAGEMENT: ICD-10-CM

## 2023-01-26 DIAGNOSIS — T45.1X5A ANEMIA ASSOCIATED WITH CHEMOTHERAPY: ICD-10-CM

## 2023-01-26 DIAGNOSIS — C91.01 ACUTE LYMPHOBLASTIC LEUKEMIA (ALL) IN REMISSION (HCC): ICD-10-CM

## 2023-01-26 DIAGNOSIS — D64.81 ANEMIA ASSOCIATED WITH CHEMOTHERAPY: ICD-10-CM

## 2023-01-26 PROCEDURE — 36430 TRANSFUSION BLD/BLD COMPNT: CPT

## 2023-01-26 PROCEDURE — 96375 TX/PRO/DX INJ NEW DRUG ADDON: CPT

## 2023-01-26 PROCEDURE — 96409 CHEMO IV PUSH SNGL DRUG: CPT

## 2023-01-26 PROCEDURE — 86945 BLOOD PRODUCT/IRRADIATION: CPT

## 2023-01-26 PROCEDURE — A4212 NON CORING NEEDLE OR STYLET: HCPCS

## 2023-01-26 PROCEDURE — 700111 HCHG RX REV CODE 636 W/ 250 OVERRIDE (IP): Performed by: PEDIATRICS

## 2023-01-26 PROCEDURE — P9016 RBC LEUKOCYTES REDUCED: HCPCS

## 2023-01-26 PROCEDURE — 306780 HCHG STAT FOR TRANSFUSION PER CASE

## 2023-01-26 PROCEDURE — 86923 COMPATIBILITY TEST ELECTRIC: CPT

## 2023-01-26 RX ORDER — PROMETHAZINE HYDROCHLORIDE 6.25 MG/5ML
0.25 SYRUP ORAL EVERY 6 HOURS PRN
Status: CANCELLED | OUTPATIENT
Start: 2023-01-27

## 2023-01-26 RX ORDER — ONDANSETRON 2 MG/ML
8 INJECTION INTRAMUSCULAR; INTRAVENOUS ONCE
Status: CANCELLED | OUTPATIENT
Start: 2023-01-27

## 2023-01-26 RX ORDER — DIPHENHYDRAMINE HYDROCHLORIDE 50 MG/ML
50 INJECTION INTRAMUSCULAR; INTRAVENOUS ONCE
Status: CANCELLED | OUTPATIENT
Start: 2023-01-31 | End: 2023-01-31

## 2023-01-26 RX ORDER — DIPHENHYDRAMINE HYDROCHLORIDE 50 MG/ML
50 INJECTION INTRAMUSCULAR; INTRAVENOUS PRN
Status: CANCELLED | OUTPATIENT
Start: 2023-01-31

## 2023-01-26 RX ORDER — ONDANSETRON 2 MG/ML
8 INJECTION INTRAMUSCULAR; INTRAVENOUS EVERY 8 HOURS PRN
Status: CANCELLED | OUTPATIENT
Start: 2023-01-31

## 2023-01-26 RX ORDER — ONDANSETRON 2 MG/ML
8 INJECTION INTRAMUSCULAR; INTRAVENOUS ONCE
Status: CANCELLED | OUTPATIENT
Start: 2023-01-26

## 2023-01-26 RX ORDER — 0.9 % SODIUM CHLORIDE 0.9 %
20 VIAL (ML) INJECTION PRN
Status: CANCELLED | OUTPATIENT
Start: 2023-01-26

## 2023-01-26 RX ORDER — ONDANSETRON 2 MG/ML
8 INJECTION INTRAMUSCULAR; INTRAVENOUS ONCE
Status: CANCELLED | OUTPATIENT
Start: 2023-01-31

## 2023-01-26 RX ORDER — ONDANSETRON 2 MG/ML
8 INJECTION INTRAMUSCULAR; INTRAVENOUS EVERY 8 HOURS PRN
Status: CANCELLED | OUTPATIENT
Start: 2023-02-07

## 2023-01-26 RX ORDER — ONDANSETRON 2 MG/ML
8 INJECTION INTRAMUSCULAR; INTRAVENOUS EVERY 8 HOURS PRN
Status: CANCELLED | OUTPATIENT
Start: 2023-01-26

## 2023-01-26 RX ORDER — HEPARIN SODIUM (PORCINE) LOCK FLUSH IV SOLN 100 UNIT/ML 100 UNIT/ML
500 SOLUTION INTRAVENOUS PRN
Status: DISCONTINUED | OUTPATIENT
Start: 2023-01-26 | End: 2023-01-27 | Stop reason: HOSPADM

## 2023-01-26 RX ORDER — ACETAMINOPHEN 325 MG/1
650 TABLET ORAL PRN
Status: CANCELLED | OUTPATIENT
Start: 2023-01-26

## 2023-01-26 RX ORDER — DIPHENHYDRAMINE HYDROCHLORIDE 50 MG/ML
25 INJECTION INTRAMUSCULAR; INTRAVENOUS PRN
Status: CANCELLED | OUTPATIENT
Start: 2023-01-26

## 2023-01-26 RX ORDER — HEPARIN SODIUM (PORCINE) LOCK FLUSH IV SOLN 100 UNIT/ML 100 UNIT/ML
500 SOLUTION INTRAVENOUS PRN
Status: CANCELLED | OUTPATIENT
Start: 2023-01-26

## 2023-01-26 RX ORDER — PROMETHAZINE HYDROCHLORIDE 6.25 MG/5ML
0.25 SYRUP ORAL EVERY 6 HOURS PRN
Status: CANCELLED | OUTPATIENT
Start: 2023-02-07

## 2023-01-26 RX ORDER — ONDANSETRON 2 MG/ML
8 INJECTION INTRAMUSCULAR; INTRAVENOUS EVERY 8 HOURS PRN
Status: CANCELLED | OUTPATIENT
Start: 2023-01-27

## 2023-01-26 RX ORDER — PROMETHAZINE HYDROCHLORIDE 6.25 MG/5ML
0.25 SYRUP ORAL EVERY 6 HOURS PRN
Status: CANCELLED | OUTPATIENT
Start: 2023-01-26

## 2023-01-26 RX ORDER — PROMETHAZINE HYDROCHLORIDE 6.25 MG/5ML
0.25 SYRUP ORAL EVERY 6 HOURS PRN
Status: CANCELLED | OUTPATIENT
Start: 2023-01-31

## 2023-01-26 RX ORDER — HEPARIN SODIUM,PORCINE 10 UNIT/ML
30 VIAL (ML) INTRAVENOUS PRN
Status: CANCELLED | OUTPATIENT
Start: 2023-01-26

## 2023-01-26 RX ORDER — ONDANSETRON 2 MG/ML
8 INJECTION INTRAMUSCULAR; INTRAVENOUS ONCE
Status: COMPLETED | OUTPATIENT
Start: 2023-01-26 | End: 2023-01-26

## 2023-01-26 RX ADMIN — CYTARABINE 130 MG: 20 INJECTION, SOLUTION INTRATHECAL; INTRAVENOUS; SUBCUTANEOUS at 13:35

## 2023-01-26 RX ADMIN — HEPARIN 500 UNITS: 100 SYRINGE at 13:45

## 2023-01-26 RX ADMIN — ONDANSETRON HYDROCHLORIDE 8 MG: 2 SOLUTION INTRAMUSCULAR; INTRAVENOUS at 13:28

## 2023-01-26 ASSESSMENT — FIBROSIS 4 INDEX: FIB4 SCORE: 0.95

## 2023-01-26 NOTE — ADDENDUM NOTE
Encounter addended by: Pepe Faye M.D. on: 1/25/2023 11:52 PM   Actions taken: Level of Service modified, Clinical Note Signed, Order list changed, Diagnosis association updated

## 2023-01-26 NOTE — PROGRESS NOTES
"Pharmacy Chemotherapy Verification Note:    Dx: Ph+ B-ALL        Protocol: Delayed Intensification Part 2 per [id number] (On Study Arm B, ID number: [id number])         Allergies:  Amoxicillin     /80   Pulse (!) 119   Temp 37.4 °C (99.3 °F) (Temporal)   Resp 20   Ht 1.642 m (5' 4.65\")   Wt 63.5 kg (139 lb 15.9 oz)   SpO2 96%   BMI 23.55 kg/m²  Body surface area is 1.7 meters squared.  Weight Type Height Weight BSA   Treatment plan 165 cm 64.2 kg 1.72 m²     Labs from 1/24/23 reviewed - all within treatment parameters.      Drug Order   (Drug name, dose, route, IV Fluid & volume, frequency, number of doses) Cycle: Delayed Intensification D38  *delayed for infection/hospitalization  Previous treatment: DI D32 1/20/23   Medication = cytarabine  Base Dose = 75 mg/m2  Calc Dose: Base Dose x 1.7 m2 = 127.5 mg  Final Dose = 130 mg  Route = IV  Fluid & Volume = in syringe (20mg/mL) 6.5mL  Admin Duration = Over 5 min   Days 36-39    <10% difference, ok to treat with final dose     By my signature below, I confirm this process was performed independently with the BSA and all final chemotherapy dosing calculations congruent. I have reviewed the above chemotherapy order and that my calculation of the final dose and BSA (when applicable) corroborate those calculations of the  pharmacist.     Judy Bryant, PharmD, BCOP      "

## 2023-01-26 NOTE — PROGRESS NOTES
Pediatric Hematology / Oncology  Progress Note      Patient Name:  Tomas Jean-Baptiste  : 2000   MRN: [id number]    Location of Service:  [facility]  Date of Service: 2023  Time: 11:31 PM    Primary Care Physician: Margarito Arvizu M.D.    Protocol/Treatment Plan: [place] Chromosome Precursor B-Cell Acute Lymphoblastic Leukemia, ON STUDY [id number], Delayed Intensification, Day 37 therapy     HISTORY OF PRESENT ILLNESS:     Chief Complaint: Scheduled chemotherapy    History of Present Illness: Tomas Jean-Baptiste is a 22 y.o. male who presents to the [facility] for scheduled chemotherapy.  Today is Day 37 of Delayed Intensification. Tmoas Roy presents to clinic with his mother.  Both provide accurate interval and clinical history.    No significant events reported overnight. Tomas Roy remains afebrile without any signs or symptoms of acute illness.  Energy and activity are continuing to improve by the day.  Strength and ambulation are also improved by the day. Tomas Roy demonstrates that he is freely walking without any assistance and without any significant exertional fatigue.  He does not have any tachycardia or palpitations.  Nor does he have any issues with shortness of breath.  JULY does not complain of any pain in his legs.  He does report some mild stiffness.  No complaints of any other aches or pains.  No complaints of any headaches, changes in vision or neurologic status changes.  JULY has not had any rashes or skin changes.  Right-sided chest wall pain is nearly resolved.  Taking imatinib with 100% adherence.  Also taking thioguanine with 100% adherence.  No other concerns or complaints at this time.    Constitutional:  Afebrile. No remote or acute illness.  Energy and activity are at baseline.  Appetite and oral intake are good.  HENT: Negative.  Eyes: Negative for visual  changes.  Respiratory: Negative for shortness of breath.  Cardiovascular: Negative.  Gastrointestinal: Negative for nausea, vomiting, abdominal pain, diarrhea, constipation.  Genitourinary: Negative.  Musculoskeletal: Negative.  Skin: Negative for rash, signs of infection.  Neurological: Negative.  Endo/Heme/Allergies: Does not bruise/bleed easily.    Psychiatric/Behavioral: No changes in mood, appropriate for age.     PAST MEDICAL HISTORY:     Oncologic History:  2-3 week history of worsening fatigue, right lower extremity pain  Presentation to OS and diagnosed with right LE superficial thrombus, subsegmental PE and hyperleukocytosis, anemia and thrombocytopenia  Transferred to [facility] for definitive care  Presenting (local) WBC > 440K, Hgb 10.0, platelets 53, (automated differential ANC 3190, ALC 75,310, absolute monocyte count 43339, absolute blast count 340,560)  Uric Acid 15.6, phosphorus 5.6, LDH 1114  Rasburicase x 1 dose given   Peripheral Blood flow cytometry demonstrating CD10 pos, CD19 pos, CD20 neg, CD22 dim (60%) 5/28/2022  Peripheral blood FISH for BCR-ABL1 positive in 98% of analyzed cells     Age at Diagnosis: 21 years  White Blood Cell Count at Presentation: > 440 k/uL  Testicular Disease Status: Negative (see procedure note 5/30/2022)  CNS Status: 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, diagnostic LP with WBC 1, RBC 3 and no evidence of leukemic blasts 5/30/2022  Steroid Pre-treatment: None  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     All inclusion/exclusion criteria for [id number] met and consent signed - enrolled 5/29/2022   All inclusion/exclusion criteria for [id number] met and consent signed - enrolled 5/30/2022  Confirmatory bone marrow aspirate and biopsy and diagnostic LP + cytarabine 70 mg IT 5/30/2022  Induction therapy (ON STUDY [id number]) started 5/30/2022  Bone marrow immunohistochemistry consistent with  diagnosis of B-ALL comprising 90% of marrow cellularity  Bone marrow [facility] for [place] purposes:  Flow cytom  Of the blood pressure little high that is a problem is a cultural problem is well and cultural genetic and everything else like that unfortunately breathalyzers such bad heart disease diabetes things like that  populations etry consistent with peripheral blood, cytogenetics remarkable for known t(9;22)  CSF with WBC 1, RBC 3, no blasts identified on cytospin  FISH results available 5/31/2022 making patient eligible for transfer from [place] [address] to [place] [address] as eligibility requirements were met for [place] [address]  Patient unenrolled from [place] [address] (BCR-ABL1 fusion positive) 6/1/2022  Consent signed for [place] [address] and patient enrolled 6/1/2022 (see eligibility checklist from 5/31/2022 and consent note from 6/1/2022)  Imatinib 400 mg PO QAM / 200 mg PO QPM started 6/3/2022 (allowed per [place] [address])  Patient completed the first 15 days of a Standard 4-drug Induction on 6/13/2022  Start of [place] [address](OS), Induction IA Part 2, Day 15 6/13/2022  End of Induction 1A Part 2 - MRD negative  Start of [place] [address](OS), Induction IA Part 2, Day 15 7/5/2022  Induction IB DELAYED 2 weeks 14 days from 7/26/2022-8/9/2022) for myelosuppression - Start of Day 22 cytarabine block 8/9/2022  Induction IB Day 42 delayed 9 days for myelosuppression - Day 42 evaluations 9/7/2022  End of Induction IB - Flow cytometric MRD negative, MRD by IgH-TCR PCR [id number]%  Randomization to AR-Experimental Arm B of [place] [address]  Start of AR-Experimental Arm B, Interim Maintenance 9/29/2022  Weight based increase in dose of imatinib to 400 mg PO AM and 250 mg PM 9/29/2022  Thrombocytopenia not permissive of proceeding with Day 15 of Interim Maintenance  AR-Experimental Arm B, Interim Maintenance, Day 15 delayed 4 days, start 10/17/2022  AR-Experimental Arm B, Interim Maintenance, Day 29, start 11/1/2022  AR-Experimental Arm B, Interim  Maintenance, Day 43, start 11/14/2022  Last does of 6-MP 11/27/2022  AR-Experimental Arm B, Delayed Intensification, Day 1, start 12/6/2022  Admission with Severe Septic Shock 12/27/2022  Imatinib HELD 12/27/2022-1/8/2023  AR-Experimental Arm B, Delayed Intensification, Day 29 DELAYED 14 days with start 1/17/2023      Past Medical History:    1) Previously Healthy  2) 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  3) Hyperleukocytosis  4) Hyperuricemia  5) Hyperphosphatemia  6) Right Lower Extremity Superficial Thrombus  7) Subsegmental Pulmonary Embolism  8) 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   Two dogs.  Everyone is well in the house. Father not involved.     Family History:     Family History             Family History   Problem Relation Age of Onset    No Known Problems Mother      Diabetes Paternal Grandfather      Hypertension Paternal Grandfather      Hyperlipidemia Paternal Grandfather      Cancer Neg Hx      Heart Disease Neg Hx      Stroke Neg Hx           No significant family history of cancer.  Both maternal and paternal family history of diabetes.     Immunizations:  UTD    Medications:   Current Outpatient Medications on File Prior to Encounter   Medication Sig Dispense Refill    sulfamethoxazole-trimethoprim (BACTRIM DS) 800-160 MG tablet       Apixaban (ELIQUIS PO) Take  by mouth. (Patient not taking: Reported on 1/18/2023)      thioguanine (TABLOID) 40 MG tablet Take  by mouth. 16 Tablet 0    thioguanine (TABLOID) 40 MG tablet Take  "2.5 tablets by mouth for 6 days of week and 3 tablets by mouth for 1 day of week (Total 14 days) 32 Tablet 0    thioguanine (TABLOID) 40 MG tablet Take 2.5 Tablets by mouth every day. 30 Tablet 3    imatinib (GLEEVEC) 400 MG tablet TAKE 1 TABLET BY MOUTH  IN AM (650MG TOTAL DAILY DOSE) (Patient not taking: Reported on 1/24/2023) 30 Tablet 5    imatinib (GLEEVEC) 100 MG tablet TAKE 2.5 TABLET BY MOUTH  EVERY EVENING (650 mg TOTAL DAILY DOSE) (Patient taking differently: TAKE 2 TABLET BY MOUTH  EVERY EVENING (600 mg TOTAL DAILY DOSE)) 90 Tablet 5    famotidine (PEPCID) 20 MG Tab Take 1 Tablet by mouth 2 times a day. 60 Tablet 0    vitamin D3 (CHOLECALCIFEROL) 1000 Unit (25 mcg) Tab Take 1 Tablet by mouth every day.      ondansetron (ZOFRAN ODT) 8 MG TABLET DISPERSIBLE Dissovle 1 Tablet by mouth every 8 hours as needed for Nausea. 20 Tablet 0    therapeutic multivitamin-minerals (THERAGRAN-M) Tab Take 1 Tablet by mouth every day.       No current facility-administered medications on file prior to encounter.     OBJECTIVE:     Vitals:   /71   Pulse (!) 113   Temp 37.3 °C (99.2 °F) (Temporal)   Resp 20   Ht 1.654 m (5' 5.12\")   Wt 63.3 kg (139 lb 8.8 oz)   SpO2 95%     Labs:    No new labs    Physical Exam:    Constitutional: Well-developed, well-nourished, and in no distress.  Very well-appearing.  HENT: Normocephalic and atraumatic. No nasal congestion or rhinorrhea. Oropharynx is clear and moist. No oral ulcerations or sores.    Eyes: Conjunctivae are normal. Pupils are equal, round.  EOMI.  Nonicteric.  Neck: Normal range of motion of neck, no adenopathy.    Cardiovascular: Normal rate, regular rhythm and normal heart sounds.  No murmur heard. DP/radial pulses 2+, cap refill < 2 sec.  Pulmonary/Chest: Effort normal and breath sounds normal. No respiratory distress. Symmetric expansion.  No crackles or wheezes.  Abdomen: Soft. Bowel sounds are normal. No distension and no mass. There is no " hepatosplenomegaly.    Genitourinary:  Deferred.  Musculoskeletal: Normal range of motion of lower and upper extremities bilaterally.   Neurological: Alert and oriented to person and place. Exhibits normal muscle tone bilaterally in upper and lower extremities. Gait stiff but able to ambulate without any assistance. Coordination normal.    Skin: Skin is warm, dry and pink.  No rash or evidence of skin infection.  No pallor.   Psychiatric: Mood and affect normal for age.    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 and whose End of Induction IB MRD was both negative by flow cytometry and PCR who presents after recovered from severe septic shock for Delayed Intensification, Day 37 therapy      1) Ph+ Precursor B-Cell Acute Lymphoblastic Leukemia, in MRD Remission:              - 2-3 weeks of symptoms              - Presenting WBC > 440 k/uL, hyperleukocytosis              - Start of Hydroxyurea (1500 mg PO Q8) 2320 on 5/27/2022  - discontinued after only 55 hours              - No steroid pretreatment              - 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              - Testicular status NEGATIVE                   - Flow cytometry of both 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, FISH positive for BCR-ABL1 translocation              - Enrolled on [facility] [id number]              - Initially enrolled on [facility] [id number] - but 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                            - Enrolled on [facility] [id number] and began study 6/13/2022              - Started imatinib therapy 6/3/2022   - End of Induction IA and IB MRD negative                        - Imatinib dose increased to 400 mg PO AM and 250 mg PM 9/29/2022              -* Note:  All imatinib doses given inpatient rounded down to 600 mg                         - Imatinib held for Septic Shock  12/27/2022-1/8/2023                         - Imatinib 600 mg PO daily restarted 1/8/2023 inpatient                         - Imatinib 400 mg PO QAM and 250 mg PO QHS 1/14/2023-1/17/2023                         - Imatinib 600 mg PO QAM 1/17/2023 - present                 - WBC 1.2, Hgb 7.5, platelets 100 yesterday              - ,  yesterday              - AST 17, ALT 14, total bilirubin 0.5 yesterday     [id number], AR Arm B, Delayed Intensification, Day 36:               ** Cytarabine 130 mg IV x1 on Days 36, 37 (TODAY), 38 and 39              ** Methotrexate 12 mg IT today (see separate procedure note)              ** Continue thioguanine 2.5 tablets x 6 days and 3 tablets x 1 day on Days 29 through 42 (due to specialty pharmacy needing to supply medication, thioguanine to start today)              ** Continue imatinib 600 mg PO QAM             - Return to clinic for Day 38 cytarabine             2) Chemotherapy Related Pancytopenia:              - WBC 1.2, Hgb 7.5, platelets 100 yesterday              - ,  yesterday  - Transfuse for hemoglobin less than 7  - Transfuse for platelets less than 10         -Plan for transfusion tomorrow.  COD ordered today     3) Chemotherapy Induced Nausea and Vomiting:              - Zofran prior to chemotherapy              - Zofran and Ativan available PRN at home     4) Sixth Cranial Nerve Palsy (IMPROVED/RESOLVED):             - Followed by Dr. Carranza     5) At Risk of Opportunistic Lung Infection:             - Bactrim DS PO BID Sat and Sun      6) History Escherichia coli and Klebsiella Sepsis/Septic Shock in an Immunocompromised Host:  - Completed 16 days of cefepime following first negative culture  - Port remained in place  - Left lower extremity hematoma demonstrating many white blood cells but no growth of bacteria  - Monitor for fever and signs of recurrent infection especially as counts drop     7) Leg Pain (NEARLY RESOLVED):     8)  Social:             - Continue with support             - Continue school as tolerated    8a) Financial Concerns:   - Difficulty managing/navigating finances, referral will be placed to Oncology Financial Counselor     9) Access:             - R Port-A-Cath in place      10) Research Participant:           [facility] - Source Data         Diagnosis: Ph+ Precursor B-Cell Acute Lymphoblastic Leukemia     Disease Status: EOI1A MRD NEGATIVE, EOI1B RD NEGATIVE, CNS3c, testicular negative, HSV1 IgG POSITIVE, CMV IgG NEGATIVE, VARICELLA IgG POSITIVE     Active Studies: [id number], [id number]                                                                                                      Inactive Studies: [id number]                                                                                                                                                Optional Studies: None             Protocol: International Phase 3 trial in [place] 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      Treatment Plan: [id number](OS), AR-Arm B, Delayed Intensification, Day 37     Height: 1.650 m      Weight: 64.2 kg       BSA: 1.72 m²   (Delayed Intensification Day 29 1/17/2023)                                                                                                                                           Performance Status: Karnofsky 80, ECOG  1 (1/25/2023)     Treatment Plan Medications:  (100% adherent with imatinib and thioguanine)     Imatinib dose adjusted for weight at DI, Day 29 to Imatinib 600 mg PO daily in AM     Continue thioguanine 2.5 tablets PO x 6 days and 3 tablets PO x 1 days (total 14 days) today as obtaining medication from specialty pharmacy caused 1 day delay - to complete 1/31/2023     Evaluations / Study Labs:  (1/25/2023)     No lab evaluations necessary for Day 37     Therapy Given:  (1/25/2023)     Cytarabine 130 mg IV x1 dose  Thioguanine 2.5 tablets PO x6 days and 3 tablets PO x1 day  Imatinib 600 mg PO QAM         Disposition: Return to clinic tomorrow for Day 38 cytarabine    Pepe Faye MD  Pediatric Hematology / Oncology  [facility]  Cell.  [phone]  Office. [phone]

## 2023-01-27 ENCOUNTER — HOSPITAL ENCOUNTER (OUTPATIENT)
Dept: INFUSION CENTER | Facility: MEDICAL CENTER | Age: 23
End: 2023-01-27
Attending: PEDIATRICS
Payer: COMMERCIAL

## 2023-01-27 VITALS
OXYGEN SATURATION: 97 % | BODY MASS INDEX: 23.43 KG/M2 | SYSTOLIC BLOOD PRESSURE: 122 MMHG | HEIGHT: 65 IN | HEART RATE: 114 BPM | WEIGHT: 140.65 LBS | DIASTOLIC BLOOD PRESSURE: 73 MMHG | RESPIRATION RATE: 20 BRPM | TEMPERATURE: 98.3 F

## 2023-01-27 DIAGNOSIS — Z51.11 ENCOUNTER FOR CHEMOTHERAPY MANAGEMENT: ICD-10-CM

## 2023-01-27 DIAGNOSIS — C91.01 ACUTE LYMPHOBLASTIC LEUKEMIA (ALL) IN REMISSION (HCC): ICD-10-CM

## 2023-01-27 DIAGNOSIS — C91.Z0 B LYMPHOBLASTIC LEUKEMIA WITH T(9;22)(Q34;Q11.2);BCR-ABL1 (HCC): ICD-10-CM

## 2023-01-27 PROCEDURE — 96409 CHEMO IV PUSH SNGL DRUG: CPT

## 2023-01-27 PROCEDURE — 700111 HCHG RX REV CODE 636 W/ 250 OVERRIDE (IP): Performed by: PEDIATRICS

## 2023-01-27 PROCEDURE — A4212 NON CORING NEEDLE OR STYLET: HCPCS

## 2023-01-27 PROCEDURE — 96375 TX/PRO/DX INJ NEW DRUG ADDON: CPT

## 2023-01-27 RX ORDER — 0.9 % SODIUM CHLORIDE 0.9 %
20 VIAL (ML) INJECTION PRN
Status: CANCELLED | OUTPATIENT
Start: 2023-01-27

## 2023-01-27 RX ORDER — HEPARIN SODIUM,PORCINE 10 UNIT/ML
30 VIAL (ML) INTRAVENOUS PRN
Status: CANCELLED | OUTPATIENT
Start: 2023-01-27

## 2023-01-27 RX ORDER — ONDANSETRON 2 MG/ML
8 INJECTION INTRAMUSCULAR; INTRAVENOUS ONCE
Status: COMPLETED | OUTPATIENT
Start: 2023-01-27 | End: 2023-01-27

## 2023-01-27 RX ORDER — HEPARIN SODIUM (PORCINE) LOCK FLUSH IV SOLN 100 UNIT/ML 100 UNIT/ML
500 SOLUTION INTRAVENOUS PRN
Status: DISCONTINUED | OUTPATIENT
Start: 2023-01-27 | End: 2023-01-28 | Stop reason: HOSPADM

## 2023-01-27 RX ORDER — HEPARIN SODIUM (PORCINE) LOCK FLUSH IV SOLN 100 UNIT/ML 100 UNIT/ML
500 SOLUTION INTRAVENOUS PRN
Status: CANCELLED | OUTPATIENT
Start: 2023-01-27

## 2023-01-27 RX ADMIN — HEPARIN 500 UNITS: 100 SYRINGE at 12:15

## 2023-01-27 RX ADMIN — CYTARABINE 130 MG: 20 INJECTION, SOLUTION INTRATHECAL; INTRAVENOUS; SUBCUTANEOUS at 12:05

## 2023-01-27 RX ADMIN — ONDANSETRON 8 MG: 2 INJECTION INTRAMUSCULAR; INTRAVENOUS at 11:52

## 2023-01-27 ASSESSMENT — FIBROSIS 4 INDEX: FIB4 SCORE: 0.95

## 2023-01-27 NOTE — ADDENDUM NOTE
Encounter addended by: Caren Saravia R.N. on: 1/27/2023 9:38 AM   Actions taken: Clinical Note Signed

## 2023-01-27 NOTE — PROGRESS NOTES
Late Entry -     [facility] for blood transfusion and chemotherapy.  Afebrile.  VSS. Port accessed using a 22g 3/4 inch trevino needle with 1 attempt.   PT tolerated well.     PRBC / Platelet: Donor #  [id number] D started at 1109    Total volume infused: 358    Transfusion completed at 1325 and PT tolerated well.      Chemotherapy dosage calculated independently by Mary Saravia RN and Tammie Covington RN and compared to road map for protocol [id number].  Calculations within 10% of written order.  Lab results reviewed.      Premedications and chemo given as ordered, see MAR.  Blood return verified prior to, during, and after chemotherapy infusion.  See Chemotherapy flowsheet.  PT tolerated well.  No side effects or complications noted.  Port flushed per orders (see MAR) and de-accessed after completion. PT home with Mother.  Will return for next visit on 1/27/23.

## 2023-01-27 NOTE — PROGRESS NOTES
Pt to [facility] for lab draw, doctors office visit, and chemotherapy administration.      Afebrile.  VSS.  Awake and alert in no acute distress.      Port accessed using a 22g 3/4 inch trevino needle with 1 attempt.  Pt tolerated well.      Chemotherapy dosage calculated independently by MONTSERRAT Cho and MONTSERRAT Borja and compared to road map for protocol [id number].  Calculations within 10% of written order.  Lab results reviewed.      Premedications and chemo given as ordered, see MAR.  Blood return verified prior to, and after chemotherapy infusion.  See Chemotherapy flowsheet.  PT tolerated well.  No side effects or complications noted.  Port flushed per orders (see MAR) and de-accessed after completion. PT home with Mother.  Will return for next visit on 1/31/23.

## 2023-01-27 NOTE — PROGRESS NOTES
"Pharmacy Chemotherapy Verification Note:    Dx: Ph+ B-ALL        Protocol: Delayed Intensification Part 2 per [id number] (On Study Arm B, ID number: [id number])         Allergies:  Amoxicillin     /73   Pulse (!) 119   Temp 37.1 °C (98.7 °F) (Temporal)   Resp 20   Ht 1.642 m (5' 4.65\")   Wt 63.8 kg (140 lb 10.5 oz)   SpO2 97%   BMI 23.66 kg/m²  Body surface area is 1.71 meters squared.  Weight Type Height Weight BSA   Treatment plan 165 cm 64.2 kg 1.72 m²     Labs from 1/24/23 reviewed - all within treatment parameters.      Drug Order   (Drug name, dose, route, IV Fluid & volume, frequency, number of doses) Cycle: Delayed Intensification D39  *delayed for infection/hospitalization  Previous treatment: DI D32 1/20/23   Medication = cytarabine  Base Dose = 75 mg/m2  Calc Dose: Base Dose x 1.71 m2 = 128.25 mg  Final Dose = 130 mg  Route = IV  Fluid & Volume = in syringe (20mg/mL) 6.5mL  Admin Duration = Over 5 min   Days 36-39    <10% difference, ok to treat with final dose     By my signature below, I confirm this process was performed independently with the BSA and all final chemotherapy dosing calculations congruent. I have reviewed the above chemotherapy order and that my calculation of the final dose and BSA (when applicable) corroborate those calculations of the  pharmacist.     Judy Bryant, PharmD, BCOP      "

## 2023-01-30 NOTE — PROGRESS NOTES
"Pharmacy Chemotherapy Calculations Note:    Dx: B-ALL, PH+    Cycle: Delayed Intensification Day 43  Previous treatment: DI D36-39 on 1/24-1/27/23    On Study - [id number] arm B COG ID number: [id number]             /82   Pulse (!) 119   Temp 37.2 °C (98.9 °F) (Temporal)   Resp 20   Ht 1.642 m (5' 4.65\")   Wt 62.7 kg (138 lb 3.7 oz)   SpO2 99%   BMI 23.25 kg/m²  Body surface area is 1.69 meters squared.  Treatment plan 165 cm 64.2 kg 1.72 m²     No hold parameters for this treatment day        Vincristine 1.5 mg/m² (max 2 mg) x 1.69 m² = 2.5 mg   okay to treat with max dose = 2 mg IV    Pegaspargase 2500 units/m² x 1.69 m² = 4225 units   <10% difference, okay to treat with final dose = 4299.75 units IV         Judy Bryant, PharmD, BCOP              "

## 2023-01-31 ENCOUNTER — HOSPITAL ENCOUNTER (OUTPATIENT)
Dept: INFUSION CENTER | Facility: MEDICAL CENTER | Age: 23
End: 2023-01-31
Attending: PEDIATRICS
Payer: COMMERCIAL

## 2023-01-31 VITALS
TEMPERATURE: 99.9 F | DIASTOLIC BLOOD PRESSURE: 70 MMHG | BODY MASS INDEX: 23.03 KG/M2 | RESPIRATION RATE: 20 BRPM | HEIGHT: 65 IN | HEART RATE: 93 BPM | WEIGHT: 138.23 LBS | SYSTOLIC BLOOD PRESSURE: 115 MMHG | OXYGEN SATURATION: 100 %

## 2023-01-31 DIAGNOSIS — C91.01 ACUTE LYMPHOBLASTIC LEUKEMIA (ALL) IN REMISSION (HCC): ICD-10-CM

## 2023-01-31 DIAGNOSIS — C91.Z0 B LYMPHOBLASTIC LEUKEMIA WITH T(9;22)(Q34;Q11.2);BCR-ABL1 (HCC): ICD-10-CM

## 2023-01-31 DIAGNOSIS — Z51.11 ENCOUNTER FOR CHEMOTHERAPY MANAGEMENT: ICD-10-CM

## 2023-01-31 LAB
ALBUMIN SERPL BCP-MCNC: 3.9 G/DL (ref 3.2–4.9)
ALBUMIN/GLOB SERPL: 1.5 G/DL
ALP SERPL-CCNC: 171 U/L (ref 30–99)
ALT SERPL-CCNC: 15 U/L (ref 2–50)
ANION GAP SERPL CALC-SCNC: 13 MMOL/L (ref 7–16)
ANISOCYTOSIS BLD QL SMEAR: ABNORMAL
AST SERPL-CCNC: 17 U/L (ref 12–45)
BASOPHILS # BLD AUTO: 0 % (ref 0–1.8)
BASOPHILS # BLD: 0 K/UL (ref 0–0.12)
BILIRUB CONJ SERPL-MCNC: <0.2 MG/DL (ref 0.1–0.5)
BILIRUB INDIRECT SERPL-MCNC: NORMAL MG/DL (ref 0–1)
BILIRUB SERPL-MCNC: 0.4 MG/DL (ref 0.1–1.5)
BUN SERPL-MCNC: 6 MG/DL (ref 8–22)
CALCIUM ALBUM COR SERPL-MCNC: 8.9 MG/DL (ref 8.5–10.5)
CALCIUM SERPL-MCNC: 8.8 MG/DL (ref 8.5–10.5)
CHLORIDE SERPL-SCNC: 105 MMOL/L (ref 96–112)
CO2 SERPL-SCNC: 21 MMOL/L (ref 20–33)
CREAT SERPL-MCNC: 0.36 MG/DL (ref 0.5–1.4)
EOSINOPHIL # BLD AUTO: 0.02 K/UL (ref 0–0.51)
EOSINOPHIL NFR BLD: 2 % (ref 0–6.9)
ERYTHROCYTE [DISTWIDTH] IN BLOOD BY AUTOMATED COUNT: 51.8 FL (ref 35.9–50)
GFR SERPLBLD CREATININE-BSD FMLA CKD-EPI: 164 ML/MIN/1.73 M 2
GLOBULIN SER CALC-MCNC: 2.6 G/DL (ref 1.9–3.5)
GLUCOSE BLD STRIP.AUTO-MCNC: 93 MG/DL (ref 65–99)
GLUCOSE SERPL-MCNC: 93 MG/DL (ref 65–99)
HCT VFR BLD AUTO: 22.1 % (ref 42–52)
HGB BLD-MCNC: 7 G/DL (ref 14–18)
LYMPHOCYTES # BLD AUTO: 0.1 K/UL (ref 1–4.8)
LYMPHOCYTES NFR BLD: 9.2 % (ref 22–41)
MANUAL DIFF BLD: NORMAL
MCH RBC QN AUTO: 28.1 PG (ref 27–33)
MCHC RBC AUTO-ENTMCNC: 31.7 G/DL (ref 33.7–35.3)
MCV RBC AUTO: 88.8 FL (ref 81.4–97.8)
MICROCYTES BLD QL SMEAR: ABNORMAL
MONOCYTES # BLD AUTO: 0 K/UL (ref 0–0.85)
MONOCYTES NFR BLD AUTO: 0 % (ref 0–13.4)
MORPHOLOGY BLD-IMP: NORMAL
NEUTROPHILS # BLD AUTO: 0.98 K/UL (ref 1.82–7.42)
NEUTROPHILS NFR BLD: 88.8 % (ref 44–72)
NRBC # BLD AUTO: 0 K/UL
NRBC BLD-RTO: 0 /100 WBC
OVALOCYTES BLD QL SMEAR: NORMAL
PLATELET # BLD AUTO: 14 K/UL (ref 164–446)
PLATELET BLD QL SMEAR: NORMAL
PLATELETS.RETICULATED NFR BLD AUTO: 2.8 K/UL (ref 0.6–13.1)
PMV BLD AUTO: 12.4 FL (ref 9–12.9)
POIKILOCYTOSIS BLD QL SMEAR: NORMAL
POTASSIUM SERPL-SCNC: 3.4 MMOL/L (ref 3.6–5.5)
PROT SERPL-MCNC: 6.5 G/DL (ref 6–8.2)
RBC # BLD AUTO: 2.49 M/UL (ref 4.7–6.1)
RBC BLD AUTO: PRESENT
SODIUM SERPL-SCNC: 139 MMOL/L (ref 135–145)
WBC # BLD AUTO: 1.1 K/UL (ref 4.8–10.8)

## 2023-01-31 PROCEDURE — 96413 CHEMO IV INFUSION 1 HR: CPT

## 2023-01-31 PROCEDURE — 700111 HCHG RX REV CODE 636 W/ 250 OVERRIDE (IP): Performed by: PEDIATRICS

## 2023-01-31 PROCEDURE — 96415 CHEMO IV INFUSION ADDL HR: CPT

## 2023-01-31 PROCEDURE — 85007 BL SMEAR W/DIFF WBC COUNT: CPT

## 2023-01-31 PROCEDURE — 85025 COMPLETE CBC W/AUTO DIFF WBC: CPT

## 2023-01-31 PROCEDURE — 306780 HCHG STAT FOR TRANSFUSION PER CASE

## 2023-01-31 PROCEDURE — 96375 TX/PRO/DX INJ NEW DRUG ADDON: CPT

## 2023-01-31 PROCEDURE — 82962 GLUCOSE BLOOD TEST: CPT

## 2023-01-31 PROCEDURE — 700105 HCHG RX REV CODE 258: Performed by: PEDIATRICS

## 2023-01-31 PROCEDURE — 36591 DRAW BLOOD OFF VENOUS DEVICE: CPT

## 2023-01-31 PROCEDURE — 82248 BILIRUBIN DIRECT: CPT

## 2023-01-31 PROCEDURE — 85055 RETICULATED PLATELET ASSAY: CPT

## 2023-01-31 PROCEDURE — A4212 NON CORING NEEDLE OR STYLET: HCPCS

## 2023-01-31 PROCEDURE — 80053 COMPREHEN METABOLIC PANEL: CPT

## 2023-01-31 PROCEDURE — 96411 CHEMO IV PUSH ADDL DRUG: CPT

## 2023-01-31 PROCEDURE — 99214 OFFICE O/P EST MOD 30 MIN: CPT | Performed by: PEDIATRICS

## 2023-01-31 RX ORDER — HEPARIN SODIUM,PORCINE 10 UNIT/ML
30 VIAL (ML) INTRAVENOUS PRN
Status: CANCELLED | OUTPATIENT
Start: 2023-01-31

## 2023-01-31 RX ORDER — HEPARIN SODIUM (PORCINE) LOCK FLUSH IV SOLN 100 UNIT/ML 100 UNIT/ML
500 SOLUTION INTRAVENOUS PRN
Status: CANCELLED | OUTPATIENT
Start: 2023-01-31

## 2023-01-31 RX ORDER — DIPHENHYDRAMINE HYDROCHLORIDE 50 MG/ML
50 INJECTION INTRAMUSCULAR; INTRAVENOUS ONCE
Status: COMPLETED | OUTPATIENT
Start: 2023-01-31 | End: 2023-01-31

## 2023-01-31 RX ORDER — 0.9 % SODIUM CHLORIDE 0.9 %
20 VIAL (ML) INJECTION PRN
Status: CANCELLED | OUTPATIENT
Start: 2023-01-31

## 2023-01-31 RX ORDER — ONDANSETRON 2 MG/ML
8 INJECTION INTRAMUSCULAR; INTRAVENOUS ONCE
Status: COMPLETED | OUTPATIENT
Start: 2023-01-31 | End: 2023-01-31

## 2023-01-31 RX ORDER — HEPARIN SODIUM (PORCINE) LOCK FLUSH IV SOLN 100 UNIT/ML 100 UNIT/ML
500 SOLUTION INTRAVENOUS PRN
Status: DISCONTINUED | OUTPATIENT
Start: 2023-01-31 | End: 2023-02-01 | Stop reason: HOSPADM

## 2023-01-31 RX ADMIN — DIPHENHYDRAMINE HYDROCHLORIDE 50 MG: 50 INJECTION INTRAMUSCULAR; INTRAVENOUS at 11:33

## 2023-01-31 RX ADMIN — ONDANSETRON 8 MG: 2 INJECTION INTRAMUSCULAR; INTRAVENOUS at 11:32

## 2023-01-31 RX ADMIN — PEGASPARGASE 4299.75 UNITS: 750 INJECTION, SOLUTION INTRAMUSCULAR; INTRAVENOUS at 12:13

## 2023-01-31 RX ADMIN — FAMOTIDINE 16.1 MG: 10 INJECTION, SOLUTION INTRAVENOUS at 11:32

## 2023-01-31 RX ADMIN — VINCRISTINE SULFATE 2 MG: 1 INJECTION, SOLUTION INTRAVENOUS at 11:54

## 2023-01-31 RX ADMIN — HEPARIN 500 UNITS: 100 SYRINGE at 15:52

## 2023-01-31 ASSESSMENT — FIBROSIS 4 INDEX: FIB4 SCORE: 0.95

## 2023-01-31 NOTE — PROGRESS NOTES
"Pharmacy Chemotherapy Verification Note:    Dx: Ph+ B-ALL        Protocol: Delayed Intensification Part 2 per [id number] (On Study Arm B, ID number: [id number])         Allergies: Amoxicillin       /82   Pulse (!) 119   Temp 37.2 °C (98.9 °F) (Temporal)   Resp 20   Ht 1.642 m (5' 4.65\")   Wt 62.7 kg (138 lb 3.7 oz)   SpO2 99%   BMI 23.25 kg/m²  Body surface area is 1.69 meters squared.    Weight Type Height Weight BSA   Treatment plan 165 cm 64.2 kg 1.72 m²        Drug Order   (Drug name, dose, route, IV Fluid & volume, frequency, number of doses) Delayed Intensification Day 43  Previous treatment: D39 on 1/27/23     Medication = Vincristine (ONCOVIN)   Base Dose = 1.5 mg/m2   Calc Dose: Base Dose x 1.69 m2 = 2.535 mg  Final Dose = 2 mg (MAX)   Route = IV  Fluid & Volume = NS 25 mL  Admin Duration = Over 5 - 10 minutes           <10% difference, OK to treat with final dose   Medication = Pegaspargase (ONCASPAR)   Base Dose = 2,500 intl units   Calc Dose: Base Dose x 1.69 m2 = 4,225 itnl units   Final Dose = 4,299.75 units   Route = IV  Fluid & Volume =  mL  Admin Duration = Over 2 hours           <10% difference, OK to treat with final dose     By my signature below, I confirm this process was performed independently with the BSA and all final chemotherapy dosing calculations congruent. I have reviewed the above chemotherapy order and that my calculation of the final dose and BSA (when applicable) corroborate those calculations of the  pharmacist.     Kayla Juarez, PharmD          "

## 2023-02-01 ENCOUNTER — HOSPITAL ENCOUNTER (OUTPATIENT)
Dept: INFUSION CENTER | Facility: MEDICAL CENTER | Age: 23
End: 2023-02-01
Attending: PEDIATRICS
Payer: COMMERCIAL

## 2023-02-01 VITALS
WEIGHT: 140.65 LBS | HEIGHT: 65 IN | DIASTOLIC BLOOD PRESSURE: 76 MMHG | HEART RATE: 102 BPM | OXYGEN SATURATION: 99 % | RESPIRATION RATE: 18 BRPM | SYSTOLIC BLOOD PRESSURE: 130 MMHG | BODY MASS INDEX: 23.43 KG/M2 | TEMPERATURE: 98.6 F

## 2023-02-01 DIAGNOSIS — D64.81 ANEMIA ASSOCIATED WITH CHEMOTHERAPY: ICD-10-CM

## 2023-02-01 DIAGNOSIS — C91.Z0 B LYMPHOBLASTIC LEUKEMIA WITH T(9;22)(Q34;Q11.2);BCR-ABL1 (HCC): ICD-10-CM

## 2023-02-01 DIAGNOSIS — T45.1X5A ANEMIA ASSOCIATED WITH CHEMOTHERAPY: ICD-10-CM

## 2023-02-01 LAB
ABO GROUP BLD: NORMAL
BARCODED ABORH UBTYP: 5100
BARCODED PRD CODE UBPRD: NORMAL
BARCODED UNIT NUM UBUNT: NORMAL
BLD GP AB SCN SERPL QL: NORMAL
COMPONENT P 8504P: NORMAL
PRODUCT TYPE UPROD: NORMAL
RH BLD: NORMAL
UNIT STATUS USTAT: NORMAL

## 2023-02-01 PROCEDURE — 36591 DRAW BLOOD OFF VENOUS DEVICE: CPT

## 2023-02-01 PROCEDURE — 86900 BLOOD TYPING SEROLOGIC ABO: CPT

## 2023-02-01 PROCEDURE — 700111 HCHG RX REV CODE 636 W/ 250 OVERRIDE (IP): Performed by: PEDIATRICS

## 2023-02-01 PROCEDURE — 86850 RBC ANTIBODY SCREEN: CPT

## 2023-02-01 PROCEDURE — 36430 TRANSFUSION BLD/BLD COMPNT: CPT

## 2023-02-01 PROCEDURE — 86945 BLOOD PRODUCT/IRRADIATION: CPT

## 2023-02-01 PROCEDURE — A4212 NON CORING NEEDLE OR STYLET: HCPCS

## 2023-02-01 PROCEDURE — P9034 PLATELETS, PHERESIS: HCPCS

## 2023-02-01 PROCEDURE — 306780 HCHG STAT FOR TRANSFUSION PER CASE

## 2023-02-01 PROCEDURE — 86901 BLOOD TYPING SEROLOGIC RH(D): CPT

## 2023-02-01 RX ORDER — HEPARIN SODIUM,PORCINE 10 UNIT/ML
30 VIAL (ML) INTRAVENOUS PRN
Status: CANCELLED | OUTPATIENT
Start: 2023-02-01

## 2023-02-01 RX ORDER — ACETAMINOPHEN 325 MG/1
650 TABLET ORAL PRN
Status: CANCELLED | OUTPATIENT
Start: 2023-02-01

## 2023-02-01 RX ORDER — DIPHENHYDRAMINE HYDROCHLORIDE 50 MG/ML
25 INJECTION INTRAMUSCULAR; INTRAVENOUS PRN
Status: CANCELLED | OUTPATIENT
Start: 2023-02-01

## 2023-02-01 RX ORDER — HEPARIN SODIUM (PORCINE) LOCK FLUSH IV SOLN 100 UNIT/ML 100 UNIT/ML
500 SOLUTION INTRAVENOUS PRN
Status: DISCONTINUED | OUTPATIENT
Start: 2023-02-01 | End: 2023-02-02 | Stop reason: HOSPADM

## 2023-02-01 RX ORDER — 0.9 % SODIUM CHLORIDE 0.9 %
20 VIAL (ML) INJECTION PRN
Status: CANCELLED | OUTPATIENT
Start: 2023-02-01

## 2023-02-01 RX ORDER — ACETAMINOPHEN 325 MG/1
650 TABLET ORAL PRN
Status: DISCONTINUED | OUTPATIENT
Start: 2023-02-01 | End: 2023-02-01

## 2023-02-01 RX ORDER — DIPHENHYDRAMINE HYDROCHLORIDE 50 MG/ML
25 INJECTION INTRAMUSCULAR; INTRAVENOUS PRN
Status: DISCONTINUED | OUTPATIENT
Start: 2023-02-01 | End: 2023-02-01

## 2023-02-01 RX ORDER — HEPARIN SODIUM (PORCINE) LOCK FLUSH IV SOLN 100 UNIT/ML 100 UNIT/ML
500 SOLUTION INTRAVENOUS PRN
Status: CANCELLED | OUTPATIENT
Start: 2023-02-01

## 2023-02-01 RX ADMIN — HEPARIN 500 UNITS: 100 SYRINGE at 12:03

## 2023-02-01 ASSESSMENT — FIBROSIS 4 INDEX: FIB4 SCORE: 6.58

## 2023-02-01 NOTE — PROGRESS NOTES
Pt to [facility] for lab draw, doctors office visit, and chemotherapy administration.      Afebrile.  VSS.  Awake and alert in no acute distress.      Port accessed using a 22g 3/4 inch trevino needle with 1 attempt.  Labs drawn from the port without difficulty.   Pt tolerated well.     Office visit completed with Dr Faye.  Chemotherapy dosage calculated independently by Huong Foster, MONTSERRAT and Tammie Covington RN and compared to road map for protocol [id number].  Calculations within 10% of written order.  Lab results reviewed.      Premedications and chemo given as ordered, see MAR.  Blood return verified prior to, during, and after chemotherapy infusion.  PT tolerated well. Pt monitored for 1 hr after completion of chemo.  No side effects or complications noted.  Port flushed per orders (see MAR) and de-accessed after completion..  Will return for next visit on 02/01/23.

## 2023-02-01 NOTE — PROGRESS NOTES
PT to [facility] for platelets transfusion, accompanied by self.  Afebrile.  VSS. Port accessed using a 22 g 3/4 inch trevino needle with 1 attempt. PT tolerated well.     Dr. Faye to BS. Okay to proceed with blood transfusion today.    Consents obtained and verified.    No Pre-medications indicated at this time.     Platelet: Donor #  [phone] [id number]  started at 1051.  Total volume infused: 228  Transfusion completed at 1156 and PT tolerated well.    Vital signs monitored per protocol.      COD drawn in anticipation of PRBC transfusion on 02/02/2023.     Port flushed per orders (see MAR) and de-accessed. Follow up instructions given.  Home with self.  Next appointment scheduled on 02/02/2023.

## 2023-02-02 ENCOUNTER — HOSPITAL ENCOUNTER (OUTPATIENT)
Dept: INFUSION CENTER | Facility: MEDICAL CENTER | Age: 23
End: 2023-02-02
Attending: PEDIATRICS
Payer: COMMERCIAL

## 2023-02-02 VITALS
RESPIRATION RATE: 18 BRPM | TEMPERATURE: 99.9 F | DIASTOLIC BLOOD PRESSURE: 61 MMHG | WEIGHT: 140.21 LBS | OXYGEN SATURATION: 99 % | HEART RATE: 119 BPM | HEIGHT: 65 IN | SYSTOLIC BLOOD PRESSURE: 113 MMHG | BODY MASS INDEX: 23.36 KG/M2

## 2023-02-02 DIAGNOSIS — T45.1X5A ANEMIA ASSOCIATED WITH CHEMOTHERAPY: ICD-10-CM

## 2023-02-02 DIAGNOSIS — C91.Z0 B LYMPHOBLASTIC LEUKEMIA WITH T(9;22)(Q34;Q11.2);BCR-ABL1 (HCC): ICD-10-CM

## 2023-02-02 DIAGNOSIS — D64.81 ANEMIA ASSOCIATED WITH CHEMOTHERAPY: ICD-10-CM

## 2023-02-02 DIAGNOSIS — Z91.89 AT RISK FOR NAUSEA AND VOMITING: ICD-10-CM

## 2023-02-02 PROCEDURE — 86945 BLOOD PRODUCT/IRRADIATION: CPT

## 2023-02-02 PROCEDURE — P9016 RBC LEUKOCYTES REDUCED: HCPCS

## 2023-02-02 PROCEDURE — 700111 HCHG RX REV CODE 636 W/ 250 OVERRIDE (IP): Performed by: PEDIATRICS

## 2023-02-02 PROCEDURE — 36430 TRANSFUSION BLD/BLD COMPNT: CPT

## 2023-02-02 PROCEDURE — 86923 COMPATIBILITY TEST ELECTRIC: CPT

## 2023-02-02 PROCEDURE — A4212 NON CORING NEEDLE OR STYLET: HCPCS

## 2023-02-02 PROCEDURE — 306780 HCHG STAT FOR TRANSFUSION PER CASE

## 2023-02-02 RX ORDER — ACETAMINOPHEN 325 MG/1
650 TABLET ORAL PRN
Status: DISCONTINUED | OUTPATIENT
Start: 2023-02-02 | End: 2023-02-03 | Stop reason: HOSPADM

## 2023-02-02 RX ORDER — ACETAMINOPHEN 325 MG/1
650 TABLET ORAL PRN
Status: CANCELLED | OUTPATIENT
Start: 2023-02-02

## 2023-02-02 RX ORDER — HEPARIN SODIUM,PORCINE 10 UNIT/ML
30 VIAL (ML) INTRAVENOUS PRN
Status: CANCELLED | OUTPATIENT
Start: 2023-02-02

## 2023-02-02 RX ORDER — DIPHENHYDRAMINE HYDROCHLORIDE 50 MG/ML
25 INJECTION INTRAMUSCULAR; INTRAVENOUS PRN
Status: DISCONTINUED | OUTPATIENT
Start: 2023-02-02 | End: 2023-02-03 | Stop reason: HOSPADM

## 2023-02-02 RX ORDER — ONDANSETRON 4 MG/1
4 TABLET, ORALLY DISINTEGRATING ORAL ONCE
Status: COMPLETED | OUTPATIENT
Start: 2023-02-02 | End: 2023-02-02

## 2023-02-02 RX ORDER — ONDANSETRON 4 MG/1
8 TABLET, ORALLY DISINTEGRATING ORAL EVERY 8 HOURS PRN
Status: DISCONTINUED | OUTPATIENT
Start: 2023-02-02 | End: 2023-02-03 | Stop reason: HOSPADM

## 2023-02-02 RX ORDER — 0.9 % SODIUM CHLORIDE 0.9 %
20 VIAL (ML) INJECTION PRN
Status: CANCELLED | OUTPATIENT
Start: 2023-02-02

## 2023-02-02 RX ORDER — DIPHENHYDRAMINE HYDROCHLORIDE 50 MG/ML
25 INJECTION INTRAMUSCULAR; INTRAVENOUS PRN
Status: CANCELLED | OUTPATIENT
Start: 2023-02-02

## 2023-02-02 RX ORDER — HEPARIN SODIUM (PORCINE) LOCK FLUSH IV SOLN 100 UNIT/ML 100 UNIT/ML
500 SOLUTION INTRAVENOUS PRN
Status: CANCELLED | OUTPATIENT
Start: 2023-02-02

## 2023-02-02 RX ORDER — HEPARIN SODIUM (PORCINE) LOCK FLUSH IV SOLN 100 UNIT/ML 100 UNIT/ML
500 SOLUTION INTRAVENOUS PRN
Status: DISCONTINUED | OUTPATIENT
Start: 2023-02-02 | End: 2023-02-03 | Stop reason: HOSPADM

## 2023-02-02 RX ADMIN — ONDANSETRON 4 MG: 4 TABLET, ORALLY DISINTEGRATING ORAL at 09:49

## 2023-02-02 RX ADMIN — HEPARIN 500 UNITS: 100 SYRINGE at 10:53

## 2023-02-02 RX ADMIN — ONDANSETRON 4 MG: 4 TABLET, ORALLY DISINTEGRATING ORAL at 09:52

## 2023-02-02 ASSESSMENT — FIBROSIS 4 INDEX: FIB4 SCORE: 6.58

## 2023-02-02 NOTE — PROGRESS NOTES
Medical Social Work    1/16/23 - SW attempted to reach MOP, no answer. SW left v/m to return call.     1/17/23 - SW had received v/m from [facility] on 1/16/23 requesting to call back later in the day due to work schedule. SW unable to return call on 1/17/23 d/t responsibilities in another clinic.     1/18/23 - SW received phone call from [facility]. SW discussed with [facility] patient's insurance and provided clarification for ongoing coverage. MOP stated being relieved with knowing patient will be able to continue with Dr. Faye after the completion of treatment. MOP tearful discussing the hardships they are currently experiencing and the medical scare in December with almost losing patient. SW provided support.     Plan: SW will continue to follow.

## 2023-02-02 NOTE — PROGRESS NOTES
PT to [facility] for PRBC transfusion, accompanied by self.  Afebrile.  VSS. Port accessed using a 22 g 3/4 inch trevino needle with 1 attempt by Mary Saravia RN. PT tolerated well.     Consents verified under media tab.    No Pre-medications indicated at this time.     PRBC: Donor #  [id number]  started at 0830.    Patient feeling nauseated, order for PO zofran obtained and administered. Per patient symptoms improved.    Total volume infused: 431  Transfusion completed at 1046 and PT tolerated well.    Vital signs monitored per protocol.      Port flushed per orders (see MAR) and de-accessed. Follow up instructions given.  Home with self.  Next appointment scheduled on 02/07/2023.

## 2023-02-03 ENCOUNTER — APPOINTMENT (OUTPATIENT)
Dept: RADIATION ONCOLOGY | Facility: MEDICAL CENTER | Age: 23
End: 2023-02-03
Attending: RADIOLOGY
Payer: MEDICAID

## 2023-02-03 ENCOUNTER — APPOINTMENT (OUTPATIENT)
Dept: PEDIATRIC HEMATOLOGY/ONCOLOGY | Facility: OUTPATIENT CENTER | Age: 23
End: 2023-02-03

## 2023-02-03 DIAGNOSIS — M25.512 ACUTE PAIN OF LEFT SHOULDER: ICD-10-CM

## 2023-02-03 RX ORDER — HYDROCODONE BITARTRATE AND ACETAMINOPHEN 5; 325 MG/1; MG/1
1 TABLET ORAL EVERY 6 HOURS PRN
Qty: 20 TABLET | Refills: 0 | Status: ON HOLD
Start: 2023-02-03 | End: 2023-02-28

## 2023-02-06 NOTE — ADDENDUM NOTE
Encounter addended by: Judy Bryant, XochiltD on: 2/6/2023 10:12 AM   Actions taken: Clinical Note Signed

## 2023-02-06 NOTE — PROGRESS NOTES
"Pharmacy Chemotherapy Calculations Note:    Dx: B-ALL, PH+    Cycle: Delayed Intensification Day 50  Previous treatment: DI D43 on 1/31/23    On Study - [id number] arm B COG ID number: [id number]             /79 (Patient Position: Sitting)   Temp 37.6 °C (99.7 °F) (Temporal)   Resp 20   Ht 1.649 m (5' 4.92\")   Wt 60.7 kg (133 lb 13.1 oz)   SpO2 100%   BMI 22.32 kg/m²  Body surface area is 1.67 meters squared.  Treatment plan 165 cm 64.2 kg 1.72 m²     No hold parameters for this treatment day        Vincristine 1.5 mg/m² (max 2 mg) x 1.67 m² = 2.5 mg   okay to treat with max dose = 2 mg IV      Judy Bryant, PharmD, BCOP              "

## 2023-02-06 NOTE — ADDENDUM NOTE
Encounter addended by: Pepe Faye M.D. on: 2/6/2023 2:05 AM   Actions taken: Clinical Note Signed, Level of Service modified

## 2023-02-06 NOTE — PROGRESS NOTES
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  3) Hyperleukocytosis  4) Hyperuricemia  5) Hyperphosphatemia  6) Right Lower Extremity Superficial Thrombus  7) Subsegmental Pulmonary Embolism  8) 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   Two dogs.  Everyone is well in the house. Father not involved.     Family History:     Family History             Family History   Problem Relation Age of Onset    No Known Problems Mother      Diabetes Paternal Grandfather      Hypertension Paternal Grandfather      Hyperlipidemia Paternal Grandfather      Cancer Neg Hx      Heart Disease Neg Hx    "   Stroke Neg Hx           No significant family history of cancer.  Both maternal and paternal family history of diabetes.     Immunizations:  UTD    Medications:   Current Outpatient Medications on File Prior to Encounter   Medication Sig Dispense Refill    sulfamethoxazole-trimethoprim (BACTRIM DS) 800-160 MG tablet       thioguanine (TABLOID) 40 MG tablet Take  by mouth. 16 Tablet 0    imatinib (GLEEVEC) 100 MG tablet TAKE 2.5 TABLET BY MOUTH  EVERY EVENING (650 mg TOTAL DAILY DOSE) (Patient taking differently: TAKE 2 TABLET BY MOUTH  EVERY EVENING (600 mg TOTAL DAILY DOSE)) 90 Tablet 5    famotidine (PEPCID) 20 MG Tab Take 1 Tablet by mouth 2 times a day. 60 Tablet 0    vitamin D3 (CHOLECALCIFEROL) 1000 Unit (25 mcg) Tab Take 1 Tablet by mouth every day.      ondansetron (ZOFRAN ODT) 8 MG TABLET DISPERSIBLE Dissovle 1 Tablet by mouth every 8 hours as needed for Nausea. 20 Tablet 0    therapeutic multivitamin-minerals (THERAGRAN-M) Tab Take 1 Tablet by mouth every day.      Apixaban (ELIQUIS PO) Take  by mouth. (Patient not taking: Reported on 1/18/2023)      thioguanine (TABLOID) 40 MG tablet Take 2.5 tablets by mouth for 6 days of week and 3 tablets by mouth for 1 day of week (Total 14 days) 32 Tablet 0    thioguanine (TABLOID) 40 MG tablet Take 2.5 Tablets by mouth every day. 30 Tablet 3    imatinib (GLEEVEC) 400 MG tablet TAKE 1 TABLET BY MOUTH  IN AM (650MG TOTAL DAILY DOSE) (Patient not taking: Reported on 1/24/2023) 30 Tablet 5     No current facility-administered medications on file prior to encounter.     OBJECTIVE:     Vitals:   /70   Pulse 93   Temp 37.7 °C (99.9 °F) (Temporal)   Resp 20   Ht 1.642 m (5' 4.65\")   Wt 62.7 kg (138 lb 3.7 oz)   SpO2 100%     Labs:  Hospital Outpatient Visit on 01/31/2023   Component Date Value    WBC 01/31/2023 1.1 (LL)     RBC 01/31/2023 2.49 (L)     Hemoglobin 01/31/2023 7.0 (L)     Hematocrit 01/31/2023 22.1 (L)     MCV 01/31/2023 88.8     MCH 01/31/2023 " 28.1     MCHC 01/31/2023 31.7 (L)     RDW 01/31/2023 51.8 (H)     Platelet Count 01/31/2023 14 (LL)     MPV 01/31/2023 12.4     Neutrophils-Polys 01/31/2023 88.80 (H)     Lymphocytes 01/31/2023 9.20 (L)     Monocytes 01/31/2023 0.00     Eosinophils 01/31/2023 2.00     Basophils 01/31/2023 0.00     Nucleated RBC 01/31/2023 0.00     Neutrophils (Absolute) 01/31/2023 0.98 (L)     Lymphs (Absolute) 01/31/2023 0.10 (L)     Monos (Absolute) 01/31/2023 0.00     Eos (Absolute) 01/31/2023 0.02     Baso (Absolute) 01/31/2023 0.00     NRBC (Absolute) 01/31/2023 0.00     Anisocytosis 01/31/2023 1+     Microcytosis 01/31/2023 1+     Sodium 01/31/2023 139     Potassium 01/31/2023 3.4 (L)     Chloride 01/31/2023 105     Co2 01/31/2023 21     Anion Gap 01/31/2023 13.0     Glucose 01/31/2023 93     Bun 01/31/2023 6 (L)     Creatinine 01/31/2023 0.36 (L)     Calcium 01/31/2023 8.8     AST(SGOT) 01/31/2023 17     ALT(SGPT) 01/31/2023 15     Alkaline Phosphatase 01/31/2023 171 (H)     Total Bilirubin 01/31/2023 0.4     Albumin 01/31/2023 3.9     Total Protein 01/31/2023 6.5     Globulin 01/31/2023 2.6     A-G Ratio 01/31/2023 1.5     Direct Bilirubin 01/31/2023 <0.2     Indirect Bilirubin 01/31/2023 see below     POC Glucose, Blood 01/31/2023 93     Correct Calcium 01/31/2023 8.9     GFR (CKD-EPI) 01/31/2023 164     Manual Diff Status 01/31/2023 PERFORMED     Peripheral Smear Review 01/31/2023 see below     Plt Estimation 01/31/2023 Marked Decrease     RBC Morphology 01/31/2023 Present     Poikilocytosis 01/31/2023 1+     Ovalocytes 01/31/2023 1+     Imm. Plt Fraction 01/31/2023 2.8       Physical Exam:    Constitutional: Well-developed, well-nourished, and in no distress.  Very well-appearing.  HENT: Normocephalic and atraumatic. No nasal congestion or rhinorrhea. Oropharynx is clear and moist. No oral ulcerations or sores.    Eyes: Conjunctivae are normal. Pupils are equal, round.  EOMI.  Nonicteric..  Neck: Normal range of motion of  neck, no adenopathy.    Cardiovascular: Normal rate, regular rhythm and normal heart sounds.  No murmur heard. DP/radial pulses 2+, cap refill < 2 sec  Pulmonary/Chest: Effort normal and breath sounds normal. No respiratory distress. Symmetric expansion.  No crackles or wheezes.  Tenderness to palpation over left chest wall at about the seventh eighth ribs in the midaxillary line.  Also with pain upon deep inspiration.  Abdomen: Soft. Bowel sounds are normal. No distension and no mass. There is no hepatosplenomegaly.    Genitourinary:  Deferred.  Musculoskeletal: Normal range of motion of lower and upper extremities bilaterally.   Neurological: Alert and oriented to person and place. Exhibits normal muscle tone bilaterally in upper and lower extremities. Gait improved and near normal.. Coordination normal.    Skin: Skin is warm, dry and pink.  No rash or evidence of skin infection.  No pallor.   Psychiatric: Mood and affect normal for age.    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 and whose End of Induction IB MRD was both negative by flow cytometry and PCR who presents after recovered from severe septic shock for Delayed Intensification, Day 43 therapy with vincristine and PEG asparaginase      1) Ph+ Precursor B-Cell Acute Lymphoblastic Leukemia, in MRD Remission:              - 2-3 weeks of symptoms              - Presenting WBC > 440 k/uL, hyperleukocytosis              - Start of Hydroxyurea (1500 mg PO Q8) 2320 on 5/27/2022  - discontinued after only 55 hours              - No steroid pretreatment              - 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              - Testicular status NEGATIVE                   - Flow cytometry of both 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, FISH positive for BCR-ABL1 translocation              -  Enrolled on [facility] [id number]              - Initially enrolled on [facility] [id number] - but 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                            - Enrolled on [facility] [id number] and began study 6/13/2022              - Started imatinib therapy 6/3/2022   - End of Induction IA and IB MRD negative                         - Imatinib dose increased to 400 mg PO AM and 250 mg PM 9/29/2022               -* Note:  All imatinib doses given inpatient rounded down to 600 mg                          - Imatinib held for Septic Shock 12/27/2022-1/8/2023                          - Imatinib 600 mg PO daily restarted 1/8/2023 inpatient                          - Imatinib 400 mg PO QAM and 250 mg PO QHS 1/14/2023-1/17/2023                          - Imatinib 600 mg PO QAM 1/17/2023 - present                 - WBC 1.1, Hgb 7.0, platelets 14              - ,    - AST 17, ALT 15, total bilirubin <0.2     [place] [id number], AR Arm B, Delayed Intensification, Day 43:               ** Vincristine 2 mg IV x1              ** PEG apariginase (Oncospar) 4299.75 mg IV x1               ** Continue imatinib 600 mg PO QAM   ** Will complete thioguanine this evening with last dose             - Return to clinic tomorrow for platelet transfusion and again in 1 week for Day 50 vincristine             2) Chemotherapy Related Pancytopenia:              - WBC 1.1, Hgb 7.0, platelets 14              - ,   - Transfuse for hemoglobin less than 7  - Transfuse for platelets less than 10         - Given platelets of only 14, recent DIC and thioguanine until yesterday, do not anticipate immediate recovery of platelets, plan to transfuse platelets tomorrow and PRBCs the following day     3) Chemotherapy Induced Nausea and Vomiting:              - Zofran prior to chemotherapy              - Zofran and Ativan available PRN at home     4) Sixth Cranial Nerve Palsy (IMPROVED/RESOLVED):             - Followed by Dr. Carranza     5) At Risk of  Opportunistic Lung Infection:             - Bactrim DS PO BID Sat and Sun      6) History Escherichia coli and Klebsiella Sepsis/Septic Shock in an Immunocompromised Host:  - Completed 16 days of cefepime following first negative culture  - Port remained in place  - Left lower extremity hematoma demonstrating many white blood cells but no growth of bacteria  - Monitor for fever and signs of recurrent infection especially as counts drop     7) Leg Pain (RESOLVED):     8) Right Sided Chest Wall Pain:   - Exam consistent with musculoskeletal strain   - Encouraged rest, warm packs and if needed can prescribe oral pain management    9) Social:             - Continue with support             - Continue school as tolerated     10) Access:             - R Port-A-Cath in place      11) Research Participant:           [facility] - Source Data         Diagnosis: Ph+ Precursor B-Cell Acute Lymphoblastic Leukemia     Disease Status: EOI1A MRD NEGATIVE, EOI1B RD NEGATIVE, CNS3c, testicular negative, HSV1 IgG POSITIVE, CMV IgG NEGATIVE, VARICELLA IgG POSITIVE     Active Studies: [id number], [id number]                                                                                                      Inactive Studies: [id number]                                                                                                                                                Optional Studies: None             Protocol: International Phase 3 trial in [place] 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      Treatment Plan: [id number](OS), AR-Arm B, Delayed Intensification, Day 43     Height: 1.650 m      Weight: 64.2kg       BSA: 1.72 m²   (Delayed Intensification Day 29 1/17/2023)                                                                                                                                           Performance  Status: Karnofsky 70, ECOG  2 (1/31/2023)     Treatment Plan Medications:  (100% adherent with imatinib and thioguanine)     Imatinib dose adjusted for weight at DI, Day 29 to Imatinib 600 mg PO daily in AM     Will complete thioguanine this evening     Evaluations / Study Labs:  (1/31/2023)     WBC 1.1, Hgb 7.0, platelets 14  ,      AST 17, ALT 15, total bilirubin 0.4     Therapy Given: (1/31/2023)     Vincristine 2 mg IV  PEG Aspariginase 4299.75 mg IV x1  Thioguanine last dose this evening  Imatinib 600 mg PO QAM         Disposition: Return to clinic 1 week for Day 50 of Delayed Intensification    Pepe Faye MD  Pediatric Hematology / Oncology  [facility]  Cell.  [phone]  Office. [phone]

## 2023-02-07 ENCOUNTER — HOSPITAL ENCOUNTER (OUTPATIENT)
Dept: INFUSION CENTER | Facility: MEDICAL CENTER | Age: 23
End: 2023-02-07
Attending: PEDIATRICS
Payer: COMMERCIAL

## 2023-02-07 ENCOUNTER — HOSPITAL ENCOUNTER (INPATIENT)
Facility: MEDICAL CENTER | Age: 23
LOS: 22 days | DRG: 500 | End: 2023-03-01
Attending: PEDIATRICS | Admitting: PEDIATRICS
Payer: COMMERCIAL

## 2023-02-07 VITALS
TEMPERATURE: 99.8 F | SYSTOLIC BLOOD PRESSURE: 118 MMHG | HEART RATE: 119 BPM | RESPIRATION RATE: 22 BRPM | OXYGEN SATURATION: 98 % | HEIGHT: 65 IN | BODY MASS INDEX: 22.3 KG/M2 | WEIGHT: 133.82 LBS | DIASTOLIC BLOOD PRESSURE: 61 MMHG

## 2023-02-07 DIAGNOSIS — Z51.11 ENCOUNTER FOR CHEMOTHERAPY MANAGEMENT: ICD-10-CM

## 2023-02-07 DIAGNOSIS — Z95.828 PORT-A-CATH IN PLACE: Primary | ICD-10-CM

## 2023-02-07 DIAGNOSIS — C91.Z0 B LYMPHOBLASTIC LEUKEMIA WITH T(9;22)(Q34;Q11.2);BCR-ABL1 (HCC): ICD-10-CM

## 2023-02-07 DIAGNOSIS — M60.9 MYOSITIS OF LEFT UPPER ARM, UNSPECIFIED MYOSITIS TYPE: ICD-10-CM

## 2023-02-07 DIAGNOSIS — C91.01 ACUTE LYMPHOBLASTIC LEUKEMIA (ALL) IN REMISSION (HCC): ICD-10-CM

## 2023-02-07 PROBLEM — R19.7 DIARRHEA OF PRESUMED INFECTIOUS ORIGIN: Status: ACTIVE | Noted: 2023-02-07

## 2023-02-07 PROBLEM — R11.2 CHEMOTHERAPY INDUCED NAUSEA AND VOMITING: Status: ACTIVE | Noted: 2023-02-07

## 2023-02-07 PROBLEM — R50.81 FEBRILE NEUTROPENIA (HCC): Status: ACTIVE | Noted: 2023-02-07

## 2023-02-07 PROBLEM — T45.1X5A CHEMOTHERAPY INDUCED NAUSEA AND VOMITING: Status: ACTIVE | Noted: 2023-02-07

## 2023-02-07 PROBLEM — D70.9 FEBRILE NEUTROPENIA (HCC): Status: ACTIVE | Noted: 2023-02-07

## 2023-02-07 LAB
ALBUMIN SERPL BCP-MCNC: 3.7 G/DL (ref 3.2–4.9)
ALBUMIN/GLOB SERPL: 1.9 G/DL
ALP SERPL-CCNC: 198 U/L (ref 30–99)
ALT SERPL-CCNC: 20 U/L (ref 2–50)
ANION GAP SERPL CALC-SCNC: 10 MMOL/L (ref 7–16)
ANISOCYTOSIS BLD QL SMEAR: ABNORMAL
AST SERPL-CCNC: 18 U/L (ref 12–45)
BASOPHILS # BLD AUTO: 0 % (ref 0–1.8)
BASOPHILS # BLD: 0 K/UL (ref 0–0.12)
BILIRUB CONJ SERPL-MCNC: 0.3 MG/DL (ref 0.1–0.5)
BILIRUB SERPL-MCNC: 0.9 MG/DL (ref 0.1–1.5)
BUN SERPL-MCNC: 9 MG/DL (ref 8–22)
C DIFF DNA SPEC QL NAA+PROBE: NEGATIVE
C DIFF TOX GENS STL QL NAA+PROBE: NEGATIVE
CALCIUM ALBUM COR SERPL-MCNC: 8.8 MG/DL (ref 8.5–10.5)
CALCIUM SERPL-MCNC: 8.6 MG/DL (ref 8.5–10.5)
CHLORIDE SERPL-SCNC: 97 MMOL/L (ref 96–112)
CO2 SERPL-SCNC: 25 MMOL/L (ref 20–33)
CREAT SERPL-MCNC: 0.41 MG/DL (ref 0.5–1.4)
EOSINOPHIL # BLD AUTO: 0 K/UL (ref 0–0.51)
EOSINOPHIL NFR BLD: 0 % (ref 0–6.9)
ERYTHROCYTE [DISTWIDTH] IN BLOOD BY AUTOMATED COUNT: 45.4 FL (ref 35.9–50)
GFR SERPLBLD CREATININE-BSD FMLA CKD-EPI: 158 ML/MIN/1.73 M 2
GLOBULIN SER CALC-MCNC: 1.9 G/DL (ref 1.9–3.5)
GLUCOSE SERPL-MCNC: 99 MG/DL (ref 65–99)
HCT VFR BLD AUTO: 23.3 % (ref 42–52)
HGB BLD-MCNC: 7.9 G/DL (ref 14–18)
LYMPHOCYTES # BLD AUTO: 0.09 K/UL (ref 1–4.8)
LYMPHOCYTES NFR BLD: 85.7 % (ref 22–41)
MANUAL DIFF BLD: NORMAL
MCH RBC QN AUTO: 28 PG (ref 27–33)
MCHC RBC AUTO-ENTMCNC: 33.9 G/DL (ref 33.7–35.3)
MCV RBC AUTO: 82.6 FL (ref 81.4–97.8)
MICROCYTES BLD QL SMEAR: ABNORMAL
MONOCYTES # BLD AUTO: 0 K/UL (ref 0–0.85)
MONOCYTES NFR BLD AUTO: 0 % (ref 0–13.4)
MORPHOLOGY BLD-IMP: NORMAL
NEUTROPHILS # BLD AUTO: 0.01 K/UL (ref 1.82–7.42)
NEUTROPHILS NFR BLD: 14.3 % (ref 44–72)
NRBC # BLD AUTO: 0 K/UL
NRBC BLD-RTO: 0 /100 WBC
PLATELET # BLD AUTO: 22 K/UL (ref 164–446)
PLATELET BLD QL SMEAR: NORMAL
PMV BLD AUTO: 12.5 FL (ref 9–12.9)
POTASSIUM SERPL-SCNC: 3.6 MMOL/L (ref 3.6–5.5)
PROT SERPL-MCNC: 5.6 G/DL (ref 6–8.2)
RBC # BLD AUTO: 2.82 M/UL (ref 4.7–6.1)
RBC BLD AUTO: PRESENT
SODIUM SERPL-SCNC: 132 MMOL/L (ref 135–145)
WBC # BLD AUTO: 0.1 K/UL (ref 4.8–10.8)

## 2023-02-07 PROCEDURE — 700102 HCHG RX REV CODE 250 W/ 637 OVERRIDE(OP): Performed by: PEDIATRICS

## 2023-02-07 PROCEDURE — 87045 FECES CULTURE AEROBIC BACT: CPT

## 2023-02-07 PROCEDURE — A9270 NON-COVERED ITEM OR SERVICE: HCPCS | Performed by: PEDIATRICS

## 2023-02-07 PROCEDURE — 82657 ENZYME CELL ACTIVITY: CPT

## 2023-02-07 PROCEDURE — 96375 TX/PRO/DX INJ NEW DRUG ADDON: CPT

## 2023-02-07 PROCEDURE — 82248 BILIRUBIN DIRECT: CPT

## 2023-02-07 PROCEDURE — 700111 HCHG RX REV CODE 636 W/ 250 OVERRIDE (IP): Performed by: PEDIATRICS

## 2023-02-07 PROCEDURE — 87040 BLOOD CULTURE FOR BACTERIA: CPT

## 2023-02-07 PROCEDURE — 96409 CHEMO IV PUSH SNGL DRUG: CPT

## 2023-02-07 PROCEDURE — 99222 1ST HOSP IP/OBS MODERATE 55: CPT | Performed by: PEDIATRICS

## 2023-02-07 PROCEDURE — 80053 COMPREHEN METABOLIC PANEL: CPT

## 2023-02-07 PROCEDURE — 85025 COMPLETE CBC W/AUTO DIFF WBC: CPT

## 2023-02-07 PROCEDURE — 700105 HCHG RX REV CODE 258: Performed by: PEDIATRICS

## 2023-02-07 PROCEDURE — 85007 BL SMEAR W/DIFF WBC COUNT: CPT

## 2023-02-07 PROCEDURE — 87899 AGENT NOS ASSAY W/OPTIC: CPT

## 2023-02-07 PROCEDURE — A4212 NON CORING NEEDLE OR STYLET: HCPCS

## 2023-02-07 PROCEDURE — 96367 TX/PROPH/DG ADDL SEQ IV INF: CPT

## 2023-02-07 PROCEDURE — 87493 C DIFF AMPLIFIED PROBE: CPT

## 2023-02-07 PROCEDURE — 96361 HYDRATE IV INFUSION ADD-ON: CPT

## 2023-02-07 PROCEDURE — 36591 DRAW BLOOD OFF VENOUS DEVICE: CPT

## 2023-02-07 PROCEDURE — 770001 HCHG ROOM/CARE - MED/SURG/GYN PRIV*

## 2023-02-07 RX ORDER — ONDANSETRON 2 MG/ML
8 INJECTION INTRAMUSCULAR; INTRAVENOUS ONCE
Status: CANCELLED
Start: 2023-02-07 | End: 2023-02-07

## 2023-02-07 RX ORDER — DEXTROSE AND SODIUM CHLORIDE 5; .45 G/100ML; G/100ML
INJECTION, SOLUTION INTRAVENOUS CONTINUOUS
Status: CANCELLED
Start: 2023-02-07

## 2023-02-07 RX ORDER — FAMOTIDINE 20 MG/1
20 TABLET, FILM COATED ORAL 2 TIMES DAILY
Status: DISCONTINUED | OUTPATIENT
Start: 2023-02-07 | End: 2023-03-01 | Stop reason: HOSPADM

## 2023-02-07 RX ORDER — HYDROCODONE BITARTRATE AND ACETAMINOPHEN 5; 325 MG/1; MG/1
1 TABLET ORAL EVERY 6 HOURS PRN
Status: DISCONTINUED | OUTPATIENT
Start: 2023-02-07 | End: 2023-02-08

## 2023-02-07 RX ORDER — IMATINIB MESYLATE 400 MG/1
400 TABLET, FILM COATED ORAL DAILY
COMMUNITY
End: 2023-03-28 | Stop reason: SDUPTHER

## 2023-02-07 RX ORDER — IMATINIB MESYLATE 100 MG/1
200 TABLET, FILM COATED ORAL DAILY
Status: DISCONTINUED | OUTPATIENT
Start: 2023-02-08 | End: 2023-03-01 | Stop reason: HOSPADM

## 2023-02-07 RX ORDER — M-VIT,TX,IRON,MINS/CALC/FOLIC 27MG-0.4MG
1 TABLET ORAL DAILY
Status: DISCONTINUED | OUTPATIENT
Start: 2023-02-08 | End: 2023-03-01 | Stop reason: HOSPADM

## 2023-02-07 RX ORDER — SULFAMETHOXAZOLE AND TRIMETHOPRIM 800; 160 MG/1; MG/1
2 TABLET ORAL
Status: COMPLETED | OUTPATIENT
Start: 2023-02-11 | End: 2023-02-12

## 2023-02-07 RX ORDER — ONDANSETRON 2 MG/ML
8 INJECTION INTRAMUSCULAR; INTRAVENOUS ONCE
Status: COMPLETED | OUTPATIENT
Start: 2023-02-07 | End: 2023-02-07

## 2023-02-07 RX ORDER — LORAZEPAM 1 MG/1
1 TABLET ORAL EVERY 6 HOURS PRN
Status: DISCONTINUED | OUTPATIENT
Start: 2023-02-07 | End: 2023-02-08 | Stop reason: HOSPADM

## 2023-02-07 RX ORDER — ACETAMINOPHEN 500 MG
500 TABLET ORAL ONCE
Status: COMPLETED | OUTPATIENT
Start: 2023-02-07 | End: 2023-02-07

## 2023-02-07 RX ORDER — CHLORHEXIDINE GLUCONATE ORAL RINSE 1.2 MG/ML
15 SOLUTION DENTAL 4 TIMES DAILY
Status: DISCONTINUED | OUTPATIENT
Start: 2023-02-07 | End: 2023-02-20

## 2023-02-07 RX ORDER — LIDOCAINE AND PRILOCAINE 25; 25 MG/G; MG/G
CREAM TOPICAL PRN
Status: DISCONTINUED | OUTPATIENT
Start: 2023-02-07 | End: 2023-03-01 | Stop reason: HOSPADM

## 2023-02-07 RX ORDER — ACETAMINOPHEN 325 MG/1
325 TABLET ORAL ONCE
Status: COMPLETED | OUTPATIENT
Start: 2023-02-08 | End: 2023-02-08

## 2023-02-07 RX ORDER — ONDANSETRON 2 MG/ML
8 INJECTION INTRAMUSCULAR; INTRAVENOUS EVERY 8 HOURS
Status: DISCONTINUED | OUTPATIENT
Start: 2023-02-07 | End: 2023-02-20

## 2023-02-07 RX ORDER — VITAMIN B COMPLEX
1000 TABLET ORAL DAILY
Status: DISCONTINUED | OUTPATIENT
Start: 2023-02-08 | End: 2023-03-01 | Stop reason: HOSPADM

## 2023-02-07 RX ORDER — IMATINIB MESYLATE 100 MG/1
200 TABLET, FILM COATED ORAL DAILY
COMMUNITY
End: 2023-03-28 | Stop reason: SDUPTHER

## 2023-02-07 RX ORDER — DEXTROSE AND SODIUM CHLORIDE 5; .45 G/100ML; G/100ML
INJECTION, SOLUTION INTRAVENOUS CONTINUOUS
Status: DISCONTINUED | OUTPATIENT
Start: 2023-02-07 | End: 2023-02-08 | Stop reason: HOSPADM

## 2023-02-07 RX ORDER — DEXTROSE AND SODIUM CHLORIDE 5; .45 G/100ML; G/100ML
INJECTION, SOLUTION INTRAVENOUS CONTINUOUS
Status: DISCONTINUED | OUTPATIENT
Start: 2023-02-07 | End: 2023-03-01 | Stop reason: HOSPADM

## 2023-02-07 RX ORDER — SODIUM CHLORIDE 9 MG/ML
1000 INJECTION, SOLUTION INTRAVENOUS ONCE
Status: COMPLETED | OUTPATIENT
Start: 2023-02-07 | End: 2023-02-07

## 2023-02-07 RX ORDER — ACETAMINOPHEN 500 MG
500 TABLET ORAL ONCE
Status: CANCELLED | OUTPATIENT
Start: 2023-02-07

## 2023-02-07 RX ORDER — IMATINIB MESYLATE 400 MG/1
400 TABLET, FILM COATED ORAL DAILY
Status: DISCONTINUED | OUTPATIENT
Start: 2023-02-08 | End: 2023-03-01 | Stop reason: HOSPADM

## 2023-02-07 RX ADMIN — FAMOTIDINE 20 MG: 20 TABLET ORAL at 16:40

## 2023-02-07 RX ADMIN — ACETAMINOPHEN 500 MG: 500 TABLET ORAL at 12:56

## 2023-02-07 RX ADMIN — SODIUM CHLORIDE 1000 ML: 9 INJECTION, SOLUTION INTRAVENOUS at 12:11

## 2023-02-07 RX ADMIN — HYDROCODONE BITARTRATE AND ACETAMINOPHEN 1 TABLET: 5; 325 TABLET ORAL at 22:32

## 2023-02-07 RX ADMIN — CHLORHEXIDINE GLUCONATE 0.12% ORAL RINSE 15 ML: 1.2 LIQUID ORAL at 20:10

## 2023-02-07 RX ADMIN — CEFEPIME 2 G: 2 INJECTION, POWDER, FOR SOLUTION INTRAVENOUS at 21:54

## 2023-02-07 RX ADMIN — ONDANSETRON 8 MG: 2 INJECTION INTRAMUSCULAR; INTRAVENOUS at 13:03

## 2023-02-07 RX ADMIN — VINCRISTINE SULFATE 2 MG: 1 INJECTION, SOLUTION INTRAVENOUS at 13:15

## 2023-02-07 RX ADMIN — HYDROCODONE BITARTRATE AND ACETAMINOPHEN 1 TABLET: 5; 325 TABLET ORAL at 15:47

## 2023-02-07 RX ADMIN — DEXTROSE AND SODIUM CHLORIDE: 5; 450 INJECTION, SOLUTION INTRAVENOUS at 13:31

## 2023-02-07 RX ADMIN — ONDANSETRON 8 MG: 2 INJECTION INTRAMUSCULAR; INTRAVENOUS at 21:48

## 2023-02-07 RX ADMIN — CHLORHEXIDINE GLUCONATE 0.12% ORAL RINSE 15 ML: 1.2 LIQUID ORAL at 16:40

## 2023-02-07 RX ADMIN — CEFEPIME 2 G: 2 INJECTION, POWDER, FOR SOLUTION INTRAVENOUS at 14:14

## 2023-02-07 ASSESSMENT — PAIN DESCRIPTION - PAIN TYPE
TYPE: ACUTE PAIN

## 2023-02-07 ASSESSMENT — LIFESTYLE VARIABLES
DOES PATIENT WANT TO STOP DRINKING: NO
HAVE YOU EVER FELT YOU SHOULD CUT DOWN ON YOUR DRINKING: NO
TOTAL SCORE: 0
ALCOHOL_USE: NO
AVERAGE NUMBER OF DAYS PER WEEK YOU HAVE A DRINK CONTAINING ALCOHOL: 0
HOW MANY TIMES IN THE PAST YEAR HAVE YOU HAD 5 OR MORE DRINKS IN A DAY: 0
EVER HAD A DRINK FIRST THING IN THE MORNING TO STEADY YOUR NERVES TO GET RID OF A HANGOVER: NO
EVER FELT BAD OR GUILTY ABOUT YOUR DRINKING: NO
ON A TYPICAL DAY WHEN YOU DRINK ALCOHOL HOW MANY DRINKS DO YOU HAVE: 0
TOTAL SCORE: 0
CONSUMPTION TOTAL: NEGATIVE
TOTAL SCORE: 0
HAVE PEOPLE ANNOYED YOU BY CRITICIZING YOUR DRINKING: NO

## 2023-02-07 ASSESSMENT — FIBROSIS 4 INDEX
FIB4 SCORE: 3.84
FIB4 SCORE: 6.58

## 2023-02-07 NOTE — H&P
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 increase in dose of imatinib to 400 mg PO AM and 250 mg PM 9/29/2022  Thrombocytopenia not permissive of proceeding with Day 15 of Interim Maintenance  AR-Experimental Arm B, Interim Maintenance, Day 15 delayed 4 days, start 10/17/2022  AR-Experimental Arm B, Interim Maintenance, Day 29, start 11/1/2022  AR-Experimental Arm B, Interim Maintenance, Day 43, start 11/14/2022  Last does of 6-MP 11/27/2022  AR-Experimental Arm B, Delayed Intensification, Day 1, start 12/6/2022  Admission with Severe Septic Shock 12/27/2022  Imatinib HELD 12/27/2022-1/8/2023  AR-Experimental Arm B, Delayed Intensification, Day 29 DELAYED 14 days with start 1/17/2023      Past Medical History:    1) Previously Healthy  2) 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  3) Hyperleukocytosis  4) Hyperuricemia  5) Hyperphosphatemia  6) Right Lower Extremity Superficial Thrombus  7) Subsegmental Pulmonary Embolism  8) 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   Two dogs.  Everyone is well in the house. Father not involved.     Family History:     Family History             Family History   Problem Relation Age of Onset    No Known Problems Mother      Diabetes Paternal Grandfather      Hypertension Paternal Grandfather      Hyperlipidemia Paternal Grandfather      Cancer Neg Hx      Heart Disease Neg Hx      Stroke Neg Hx           No significant family history of cancer.  Both maternal and paternal family history of diabetes.     Immunizations:  UTD    Medications:     Home Medications    Medication Sig Taking? Last Dose Authorizing Provider   imatinib (GLEEVEC) 100 MG tablet Take 200 mg by mouth every day. Pt takes 200 mg + 400 mg for a total dose of 600 mg daily Yes 2/7/2023 at AM Physician Outpatient   imatinib (GLEEVEC) 400 MG tablet Take 400 mg by mouth every day. Pt takes 200 mg + 400 mg for a total dose of 600 mg daily Yes 2/7/2023 at AM Physician Outpatient   HYDROcodone-acetaminophen (NORCO) 5-325 MG Tab per tablet Take 1 Tablet by mouth every 6 hours as needed (Moderate left shoulder pain) for up to 5 days.  2/6/2023 at PM Pepe Faye M.D.   sulfamethoxazole-trimethoprim (BACTRIM DS) 800-160 MG tablet Take 2 Tablets by mouth in the morning every Sat and Sun.  2/5/2023 Physician Outpatient   vitamin D3 (CHOLECALCIFEROL) 1000 Unit (25 mcg) Tab Take 1 Tablet by mouth every day.  2/7/2023 at AM Physician Outpatient   therapeutic multivitamin-minerals (THERAGRAN-M) Tab Take 1 Tablet by mouth every day.  2/7/2023 at AM Physician Outpatient       OBJECTIVE:  "    Vitals:   /63   Pulse (!) 110   Temp 37.9 °C (100.2 °F) (Oral)   Resp (!) 23   Ht 1.651 m (5' 5\")   Wt 62 kg (136 lb 11 oz)   SpO2 94%     Labs:     Latest Reference Range & Units 02/07/23 11:40   WBC 4.8 - 10.8 K/uL 0.1 (LL)   RBC 4.70 - 6.10 M/uL 2.82 (L)   Hemoglobin 14.0 - 18.0 g/dL 7.9 (L)   Hematocrit 42.0 - 52.0 % 23.3 (L)   MCV 81.4 - 97.8 fL 82.6   MCH 27.0 - 33.0 pg 28.0   MCHC 33.7 - 35.3 g/dL 33.9   RDW 35.9 - 50.0 fL 45.4   Platelet Count 164 - 446 K/uL 22 (L)   MPV 9.0 - 12.9 fL 12.5   Neutrophils-Polys 44.00 - 72.00 % 14.30 (L)   Neutrophils (Absolute) 1.82 - 7.42 K/uL 0.01 (LL)   Lymphocytes 22.00 - 41.00 % 85.70 (H)   Lymphs (Absolute) 1.00 - 4.80 K/uL 0.09 (L)   Monocytes 0.00 - 13.40 % 0.00   Monos (Absolute) 0.00 - 0.85 K/uL 0.00   Eosinophils 0.00 - 6.90 % 0.00   Eos (Absolute) 0.00 - 0.51 K/uL 0.00   Basophils 0.00 - 1.80 % 0.00   Baso (Absolute) 0.00 - 0.12 K/uL 0.00   Nucleated RBC /100 WBC 0.00   NRBC (Absolute) K/uL 0.00   Plt Estimation  Marked Decrease   RBC Morphology  Present   Anisocytosis  1+   Microcytosis  1+   Peripheral Smear Review  see below   Manual Diff Status  PERFORMED   Sodium 135 - 145 mmol/L 132 (L)   Potassium 3.6 - 5.5 mmol/L 3.6   Chloride 96 - 112 mmol/L 97   Co2 20 - 33 mmol/L 25   Anion Gap 7.0 - 16.0  10.0   Glucose 65 - 99 mg/dL 99   Bun 8 - 22 mg/dL 9   Creatinine 0.50 - 1.40 mg/dL 0.41 (L)   GFR (CKD-EPI) >60 mL/min/1.73 m 2 158   Calcium 8.5 - 10.5 mg/dL 8.6   Correct Calcium 8.5 - 10.5 mg/dL 8.8   AST(SGOT) 12 - 45 U/L 18   ALT(SGPT) 2 - 50 U/L 20   Alkaline Phosphatase 30 - 99 U/L 198 (H)   Total Bilirubin 0.1 - 1.5 mg/dL 0.9   Direct Bilirubin 0.1 - 0.5 mg/dL 0.3   Albumin 3.2 - 4.9 g/dL 3.7   Total Protein 6.0 - 8.2 g/dL 5.6 (L)   Globulin 1.9 - 3.5 g/dL 1.9   A-G Ratio g/dL 1.9     Physical Exam:    Constitutional: Tired , having chills, in NAD  HENT: Normocephalic and atraumatic. Alopecia. No nasal congestion or rhinorrhea. Oropharynx is " clear and moist. No oral ulcerations or sores.    Eyes: Conjunctivae are normal. Pupils are equal, round, and reactive to light.    Neck: Normal range of motion of neck, no adenopathy.    Cardiovascular: Tachycardic, regular rhythm and normal heart sounds.  No murmur heard. DP/radial pulses 2+, cap refill < 2 sec  Pulmonary/Chest: Effort normal and breath sounds normal. No respiratory distress. Symmetric expansion.  No crackles or wheezes.  Abdomen: Soft. Bowel sounds are normal. No distension and no mass. There is no hepatosplenomegaly.    Genitourinary:  Deferred  Musculoskeletal: Restriction of movement of L shoulder. Intermittently on sling.   Neurological: Alert and oriented to person and place. Exhibits normal muscle tone bilaterally in upper and lower extremities. Gait normal. Coordination normal.    Skin: Skin is warm, dry and pink.  No rash or evidence of skin infection. Pallor+  Psychiatric: Anxious, crying.      ASSESSMENT AND PLAN:     Tomas Jean-Baptiste is a 22 y.o. male who presented to the [facility] for Day 50 chemotherapy with Vincristine. During that appointment he was noted to febrile with chills and dehydrated. Hence, decision made to admit him to pediatric ott for management of febrile neutropenia and dehydration.    Febrile Neutropenia in an immunocompromised host:  - Previous history of gram negative septic shock  - Patient having fever with chills  - Blood culture from port 2/7/2023: Pending  - 1 L NS bolus x 1  - D5 0.45 % NS at 100 ml/hr  - IV Cefepime x 1 in CIS, continue every 8 hrs  - Tylenol PO x 1 for fever in CIS    Diarrhea: Likely from cytarabine vs enteritis  - C.diff toxin assay : negative PCR (027-[id number] negative)   - Stool culture: pending  - Will continue to monitor    Vomiting secondary to chemotherapy (consider enteritis also)  - Zofran IV x 1 in CIS prior to VCR  - Continue Zofran scheduled every 8 hrs  -  Continue to monitor    Left shoulder pain: likely musculoskeletal  - Norco and sling helping  - Encourage rest, warm packs  - If pain worsens, consider getting US vs MRI to r/o hematoma vs abscess    Retrosternal chest pain : likely from LLOYD  - Will restart famotidine 20 mg BID  - Continue to monitor    Ph+ Precursor B-Cell Acute Lymphoblastic Leukemia, in MRD Remission:  - 2-3 weeks of symptoms  - Presenting WBC > 440 k/uL, hyperleukocytosis  - Start of Hydroxyurea (1500 mg PO Q8) 2320 on 5/27/2022  - discontinued after only 55 hours  - No steroid pretreatment  - 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  - Testicular status NEGATIVE     - Flow cytometry of both 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, FISH positive for BCR-ABL1 translocation  - Enrolled on [facility] [id number]  - Initially enrolled on [facility] [id number] - but 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    - Enrolled on [facility]1631 and began study 6/13/2022  - Started imatinib therapy 6/3/2022  - End of Induction IA and IB MRD negative  - Imatinib dose increased to 400 mg PO AM and 250 mg PM 9/29/2022  * Note:  All imatinib doses given inpatient rounded down to 600 mg        - Imatinib held for Septic Shock 12/27/2022-1/8/2023        - Imatinib 600 mg PO daily restarted 1/8/2023 inpatient        - Imatinib 400 mg PO QAM and 250 mg PO QHS 1/14/2023-1/17/2023        - Imatinib 600 mg PO QAM 1/17/2023 - present      [place] [id number], AR Arm B, Delayed Intensification, Day 50:   ** Vincristine 2 mg IV x1  ** Continue imatinib 600 mg PO Q AM                     Chemotherapy Related Pancytopenia:  - WBC 0.1, Hgb 7.9 gm/dL, platelets 22,000/microliters  - ANC 0.01, ALC 0.09  - Transfuse for hemoglobin less than 7 gm/dL or with symptoms  - Transfuse for platelets less than 10,000/microliters or with symptoms  - No indication for transfusion today  - CBC daily      At risk for deep vein thrombosis due to pegaspargase:  - Platelets  still low  - Will hold off from starting apixaban    At Risk of Opportunistic Lung Infection:  - Bactrim DS PO BID Sat and Sun for PJP prophylaxis     Sixth Cranial Nerve Palsy (IMPROVED/RESOLVED):  - Followed by Dr. Carranza     Right Sided Chest Wall Pain: improved   - Exam consistent with musculoskeletal strain  - Much improved, no pain with inhalation     Central Access:   - R Port-A-Cath in place      Research Participant:           [facility] - Source Data         Diagnosis: Ph+ Precursor B-Cell Acute Lymphoblastic Leukemia     Disease Status: EOI1A MRD NEGATIVE, EOI1B RD NEGATIVE, CNS3c, testicular negative, HSV1 IgG POSITIVE, CMV IgG NEGATIVE, VARICELLA IgG POSITIVE     Active Studies: [id number], [id number]                                                                                                      Inactive Studies: [id number]                                                                                                                                                Optional Studies: None             Protocol: International Phase 3 trial in [place] 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      Treatment Plan: [id number](OS), AR-Arm B, Delayed Intensification, Day 50     Height: 1.650 m      Weight: 64.2kg       BSA: 1.72 m²   (Delayed Intensification Day 29 1/17/2023)                                                                                                                                           Performance Status: Karnofsky 70, ECOG  2 (1/31/2023)     Treatment Plan Medications:  (100% adherent with imatinib)     Imatinib dose adjusted for weight at DI, Day 29 to Imatinib 600 mg PO daily in AM     Evaluations / Study Labs:  (2/7/2023)     - WBC 0.1, Hgb 7.9 gm/dL, platelets 22,000/microliters  - ANC 10, ALC 90     Therapy Given: (2/7/2023)     Vincristine 2 mg IV  Imatinib  600 mg PO QAM         Disposition: Pending resolution of fever, rising ANC, resolution of emesis, improvement/resolution of diarrhea    Alyce Duenas MD  Pediatric Hematology / Oncology  [facility]  Cell.  [phone]  [facility]. [phone]

## 2023-02-07 NOTE — PROGRESS NOTES
"Pharmacy Chemotherapy Verification Note:    Dx: Ph+ B-ALL        Protocol: Delayed Intensification Part 2 per [id number] (On Study Arm B, ID number: [id number])         Allergies: Amoxicillin       /82   Pulse (!) 132   Temp (!) 38.3 °C (101 °F) (Oral)   Resp 20   Ht 1.649 m (5' 4.92\")   Wt 60.7 kg (133 lb 13.1 oz)   SpO2 100%   BMI 22.32 kg/m²  Body surface area is 1.67 meters squared.    Weight Type Height Weight BSA   Treatment plan 165 cm 64.2 kg 1.72 m²     All lab results 2/7/23 within treatment parameters.      Drug Order   (Drug name, dose, route, IV Fluid & volume, frequency, number of doses) Delayed Intensification Day 50  Previous treatment: D43 on 1/31/23     Medication = Vincristine (ONCOVIN)   Base Dose = 1.5 mg/m2   Calc Dose: Base Dose x 1.67m2 = >2mg  Final Dose = 2 mg (MAX)   Route = IV  Fluid & Volume = NS 25 mL  Admin Duration = Over 5 - 10 minutes           <10% difference, OK to treat with final dose     By my signature below, I confirm this process was performed independently with the BSA and all final chemotherapy dosing calculations congruent. I have reviewed the above chemotherapy order and that my calculation of the final dose and BSA (when applicable) corroborate those calculations of the  pharmacist.     JAE Wilson, Pharm.D.            "

## 2023-02-07 NOTE — PROGRESS NOTES
Pt to [facility] for lab draw, doctors office visit, and chemotherapy administration.      Afebrile.  Elevated HR. Awake and alert but appears to be in discomfort.  Dr. Duenas to bedside.     Port accessed using a 22g 3/4 inch trevino needle with 1 attempt by Odette PICU RN.  Labs drawn from the port without difficulty.   Pt tolerated well.      Multiple episodes of emesis. Pt it relates it to smell of food within the department.    Stool sample watery. Sent to lab. Pt ambulated without difficulty but returns SOB with elevated HR. Dr. Duenas aware.    Pt medicated with Tylenol for fever. TMAX 101.1 orally. Dr. Duenas notified.     Pt medicated for nausea with Zofran.     Chemotherapy dosage calculated independently by Mary RN and MONTSERRAT Mcintosh and compared to road map for protocol [id number].  Calculations within 10% of written order.  Lab results reviewed.      Pt medicated with Cefepime as ordered. Pt tolerated well.     Pt transported by this RN to PICU S412 via wheelchair. Ashley, RN and MONTSERRAT Grier to bedside. D5 1/2NS continued to floor.     Pt complaint of chest discomfort. Dr. Duenas notiifed. Plan to start Pepcid inpatient and reassess pain.

## 2023-02-08 ENCOUNTER — APPOINTMENT (OUTPATIENT)
Dept: RADIOLOGY | Facility: MEDICAL CENTER | Age: 23
DRG: 500 | End: 2023-02-08
Attending: PEDIATRICS
Payer: COMMERCIAL

## 2023-02-08 LAB
ABO GROUP BLD: NORMAL
BLD GP AB SCN SERPL QL: NORMAL
E COLI SXT1+2 STL IA: NORMAL
ERYTHROCYTE [DISTWIDTH] IN BLOOD BY AUTOMATED COUNT: 45.9 FL (ref 35.9–50)
HCT VFR BLD AUTO: 19.6 % (ref 42–52)
HGB BLD-MCNC: 6.6 G/DL (ref 14–18)
MCH RBC QN AUTO: 28.4 PG (ref 27–33)
MCHC RBC AUTO-ENTMCNC: 33.7 G/DL (ref 33.7–35.3)
MCV RBC AUTO: 84.5 FL (ref 81.4–97.8)
NRBC # BLD AUTO: 0 K/UL
NRBC BLD-RTO: 0 /100 WBC
PLATELET # BLD AUTO: 20 K/UL (ref 164–446)
PMV BLD AUTO: 11 FL (ref 9–12.9)
RBC # BLD AUTO: 2.32 M/UL (ref 4.7–6.1)
RH BLD: NORMAL
SIGNIFICANT IND 70042: NORMAL
SITE SITE: NORMAL
SOURCE SOURCE: NORMAL
T4 FREE SERPL-MCNC: 1.33 NG/DL (ref 0.93–1.7)
TSH SERPL DL<=0.005 MIU/L-ACNC: 0.29 UIU/ML (ref 0.38–5.33)
WBC # BLD AUTO: 0.1 K/UL (ref 4.8–10.8)

## 2023-02-08 PROCEDURE — 85025 COMPLETE CBC W/AUTO DIFF WBC: CPT

## 2023-02-08 PROCEDURE — 86945 BLOOD PRODUCT/IRRADIATION: CPT

## 2023-02-08 PROCEDURE — 770019 HCHG ROOM/CARE - PEDIATRIC ICU (20*

## 2023-02-08 PROCEDURE — A9270 NON-COVERED ITEM OR SERVICE: HCPCS | Performed by: PEDIATRICS

## 2023-02-08 PROCEDURE — 99232 SBSQ HOSP IP/OBS MODERATE 35: CPT | Performed by: PEDIATRICS

## 2023-02-08 PROCEDURE — 84443 ASSAY THYROID STIM HORMONE: CPT

## 2023-02-08 PROCEDURE — 86850 RBC ANTIBODY SCREEN: CPT

## 2023-02-08 PROCEDURE — 86900 BLOOD TYPING SEROLOGIC ABO: CPT

## 2023-02-08 PROCEDURE — 700105 HCHG RX REV CODE 258: Performed by: PEDIATRICS

## 2023-02-08 PROCEDURE — P9016 RBC LEUKOCYTES REDUCED: HCPCS

## 2023-02-08 PROCEDURE — 700102 HCHG RX REV CODE 250 W/ 637 OVERRIDE(OP): Performed by: PEDIATRICS

## 2023-02-08 PROCEDURE — 770001 HCHG ROOM/CARE - MED/SURG/GYN PRIV*

## 2023-02-08 PROCEDURE — 86901 BLOOD TYPING SEROLOGIC RH(D): CPT

## 2023-02-08 PROCEDURE — 36430 TRANSFUSION BLD/BLD COMPNT: CPT

## 2023-02-08 PROCEDURE — 73030 X-RAY EXAM OF SHOULDER: CPT | Mod: LT

## 2023-02-08 PROCEDURE — 86923 COMPATIBILITY TEST ELECTRIC: CPT

## 2023-02-08 PROCEDURE — 30233N1 TRANSFUSION OF NONAUTOLOGOUS RED BLOOD CELLS INTO PERIPHERAL VEIN, PERCUTANEOUS APPROACH: ICD-10-PCS | Performed by: PEDIATRICS

## 2023-02-08 PROCEDURE — 700111 HCHG RX REV CODE 636 W/ 250 OVERRIDE (IP): Performed by: PEDIATRICS

## 2023-02-08 PROCEDURE — 84439 ASSAY OF FREE THYROXINE: CPT

## 2023-02-08 RX ORDER — ACETAMINOPHEN 325 MG/1
650 TABLET ORAL EVERY 6 HOURS PRN
Status: DISCONTINUED | OUTPATIENT
Start: 2023-02-08 | End: 2023-02-09

## 2023-02-08 RX ORDER — OXYCODONE HYDROCHLORIDE 5 MG/1
5-10 TABLET ORAL EVERY 6 HOURS PRN
Status: DISCONTINUED | OUTPATIENT
Start: 2023-02-08 | End: 2023-02-13

## 2023-02-08 RX ADMIN — IMATINIB MESYLATE 200 MG: 100 TABLET, FILM COATED ORAL at 07:44

## 2023-02-08 RX ADMIN — CHLORHEXIDINE GLUCONATE 0.12% ORAL RINSE 15 ML: 1.2 LIQUID ORAL at 20:26

## 2023-02-08 RX ADMIN — Medication 1000 UNITS: at 07:43

## 2023-02-08 RX ADMIN — CHLORHEXIDINE GLUCONATE 0.12% ORAL RINSE 15 ML: 1.2 LIQUID ORAL at 13:00

## 2023-02-08 RX ADMIN — HYDROCODONE BITARTRATE AND ACETAMINOPHEN 1 TABLET: 5; 325 TABLET ORAL at 07:48

## 2023-02-08 RX ADMIN — ACETAMINOPHEN 325 MG: 325 TABLET ORAL at 00:17

## 2023-02-08 RX ADMIN — CEFEPIME 2 G: 2 INJECTION, POWDER, FOR SOLUTION INTRAVENOUS at 17:15

## 2023-02-08 RX ADMIN — CHLORHEXIDINE GLUCONATE 0.12% ORAL RINSE 15 ML: 1.2 LIQUID ORAL at 10:21

## 2023-02-08 RX ADMIN — DEXTROSE AND SODIUM CHLORIDE: 5; 450 INJECTION, SOLUTION INTRAVENOUS at 13:00

## 2023-02-08 RX ADMIN — Medication 1 TABLET: at 07:44

## 2023-02-08 RX ADMIN — CEFEPIME 2 G: 2 INJECTION, POWDER, FOR SOLUTION INTRAVENOUS at 07:44

## 2023-02-08 RX ADMIN — ONDANSETRON 8 MG: 2 INJECTION INTRAMUSCULAR; INTRAVENOUS at 17:13

## 2023-02-08 RX ADMIN — ONDANSETRON 8 MG: 2 INJECTION INTRAMUSCULAR; INTRAVENOUS at 07:43

## 2023-02-08 RX ADMIN — IMATINIB MESYLATE 400 MG: 400 TABLET, FILM COATED ORAL at 07:44

## 2023-02-08 RX ADMIN — FAMOTIDINE 20 MG: 20 TABLET ORAL at 03:44

## 2023-02-08 RX ADMIN — ACETAMINOPHEN 650 MG: 325 TABLET, FILM COATED ORAL at 17:13

## 2023-02-08 RX ADMIN — FAMOTIDINE 20 MG: 20 TABLET ORAL at 17:26

## 2023-02-08 RX ADMIN — OXYCODONE HYDROCHLORIDE 5 MG: 5 TABLET ORAL at 17:26

## 2023-02-08 ASSESSMENT — PAIN DESCRIPTION - PAIN TYPE
TYPE: ACUTE PAIN

## 2023-02-08 NOTE — PROGRESS NOTES
Late Entry 1506:  Patient transported to S412 with RN. Patient placed on central monitor. Dr. Duenas and Katy notified of patient arrival.

## 2023-02-08 NOTE — PROGRESS NOTES
Nolan Isbell, from Lab called with critical result of WBC 0.1, ANC .02, Plt 20, hgb 6.6 at 0358. Critical lab result read back to Akilah.   Henrique FULLER, notified of critical lab result at 0400 .  Orders received.     0420 Blood bank contacted. COD running. RN to call back in 20 minutes for updates.

## 2023-02-08 NOTE — CARE PLAN
The patient is Watcher - Medium risk of patient condition declining or worsening    Shift Goals  Clinical Goals: Shower, Safe environment, Adequate UOP, Pain control, Stable VS, Complete AM labs, Closed communication with Henrique FULLER  Patient Goals: Shower, Rest, Safe environment  Family Goals: Closed loop communication, Rest, Shower    Progress made toward(s) clinical / shift goals:  Pt showered safely with assistance from mother and RN. No falls or syncopal episodes. Adequate UOP. AM labs completed- pending results. Closed communication maintained with family and Henrique FULLER.    Problem: Urinary Elimination  Goal: Establish and maintain regular urinary output  Outcome: Progressing     Problem: Fall Risk  Goal: Patient will remain free from falls  Outcome: Progressing

## 2023-02-08 NOTE — DIETARY
"Nutrition services: Day 1 of admit.  Tomas Jean-Baptiste is a 22 y.o. male with admitting DX of Leukemia.  Consult received for MST score of 3 related to poor PO and weight loss.    I met with patient at bedside this afternoon.  He reports that he has lost ~ 30 pounds in the last several months related to decreased appetite while on chemo.  His usual body weight upon chart review appears to be ~ 150-160 pounds.  In November of last year his weight was as high as 170 pounds.  Patient reports that at home he consumed [place] Instant Breakfast and that his appetite has been improving.  Patient was drowsy during my interview and asked to rest. Full NFPE not completed due to this though it is obvious from interview that patient has had severe weight loss and decreased appetite.       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     Recommendations/Plan:  Will add supplement order for boost plus.    Advance diet. Encourage intake of meals and supplements.   Document intake of all PO  as % taken in ADL's to provide interdisciplinary communication across all shifts.   Monitor weight.  Nutrition rep will continue to see patient for ongoing meal and snack preferences.     RD monitoring     "

## 2023-02-08 NOTE — PROGRESS NOTES
Assumed care of pt. Recieved report from day RNAshley. Pt. sitting up in bed, on room air and has no apparent signs of respiratory distress at this time. Mother at bedside with patient. Updated white board.      Pt demonstrates ability to turn self in bed without assistance of staff. Patient and family understands importance in prevention of skin breakdown, ulcers, and potential infection. Hourly rounding in effect. RN skin check complete.   Devices in place include: Cardiac leads, BP cuff,  sticker, Rt chest port.  Skin assessed under devices: Yes.  Confirmed HAPI identified on the following date: NA   Location of HAPI: NA.  Wound Care RN following: No.  The following interventions are in place: Pt able to reposition self in bed. Encouraged to safely ambulate.

## 2023-02-08 NOTE — PROGRESS NOTES
4 Eyes Skin Assessment Completed by Ashley RN and MONTSERRAT Grier.    Head WDL  Ears WDL  Nose WDL  Mouth WDL  Neck WDL  Breast/Chest WDL  Shoulder Blades WDL  Spine WDL  (R) Arm/Elbow/Hand WDL  (L) Arm/Elbow/Hand WDL  Abdomen WDL  Groin WDL  Scrotum/Coccyx/Buttocks WDL  (R) Leg WDL  (L) Leg WDL  (R) Heel/Foot/Toe WDL  (L) Heel/Foot/Toe WDL          Devices In Places ECG, Blood Pressure Cuff, and Pulse Ox      Interventions In Place Pillows    Possible Skin Injury No    Pictures Uploaded Into Epic N/A  Wound Consult Placed N/A  RN Wound Prevention Protocol Ordered No

## 2023-02-08 NOTE — PROGRESS NOTES
Pediatric Hematology/Oncology  Daily Progress Note      Patient Name:  Tomas Jean-Baptiste  : 2000  MRN: [id number]    Location of Service:  [facility]  Date of Service: 2023  Time: 3:58 PM    Hospital Day: 2    Protocol / Treatment Plan: [place] Chromosome Precursor B-Cell Acute Lymphoblastic Leukemia, ON STUDY [id number], Delayed Intensification, Day 51    SUBJECTIVE:     Febrile overnight with Tmax of 102.6 F. Blood culture from port with no evidence of bacterial growth. Remains on empiric cefepime.  Still with L shoulder pain which improves after taking Norco. Nausea well controlled and appetite slightly better. No emesis. Eating fries during morning rounds. No reports of diarrhea overnight or this morning. No abdominal pain/cramps. No neurologic changes. No easy bruising or rash. Received pRBCs transfusion today for low hemoglobin and feeing improved after that.     Review of Systems:     Constitutional: Febrile, feeling much better than yesterday.  Improving appetite.  HENT: Negative for auditory changes or ear pain, no nosebleeds, no congestion and rhinorrhea, no sore throat.  No mouth sores.  Eyes: Negative for visual changes.  Respiratory: Negative for shortness of breath or noisy breathing.   Cardiovascular: Negative for chest pain and leg swelling.    Gastrointestinal: Negative for abdominal pain, constipation and blood in stool.  Previously reported nausea/vomiting and diarrhea under control.  Genitourinary: Negative for dysuria.  Musculoskeletal: Positive for L shoulder pain.  Skin: Negative for rash or skin infection.  Neurological: Negative for numbness, tingling, sensory changes, weakness or headaches.    Endo/Heme/Allergies: No bruising/bleeding easily.    Psychiatric/Behavioral: More upbeat today    OBJECTIVE:     Max Temp: Temp (24hrs), Av.6 °C (99.6 °F), Min:36.7 °C (98 °F), Max:39.2 °C (102.6 °F)      Vitals: /66   Pulse (!) 121    "Temp (!) 38.1 °C (100.6 °F) (Oral)   Resp 18   Ht 1.651 m (5' 5\")   Wt 62 kg (136 lb 11 oz)   SpO2 96%   BMI 22.75 kg/m²     I/O:   Intake/Output Summary (Last 24 hours) at 2/8/2023 1558  Last data filed at 2/8/2023 1228  Gross per 24 hour   Intake 2428 ml   Output 2325 ml   Net 103 ml       Labs:    Most Recent Hematology Labs:    Lab Results   Component Value Date/Time    WBC 0.1 (LL) 02/08/2023 0300    HEMOGLOBIN 6.6 (L) 02/08/2023 0300    MCV 84.5 02/08/2023 0300    PLATELETCT 20 (L) 02/08/2023 0300    NEUTS 0.01 (LL) 02/07/2023 1140       Most Recent Metabolic Panel:    Lab Results   Component Value Date/Time    POTASSIUM 3.6 02/07/2023 1140    CHLORIDE 97 02/07/2023 1140    CO2 25 02/07/2023 1140    GLUCOSE 99 02/07/2023 1140    BUN 9 02/07/2023 1140    CREATININE 0.41 (L) 02/07/2023 1140    CALCIUM 8.6 02/07/2023 1140    ANION 10.0 02/07/2023 1140       Blood culture 2/7/2022: NGTD    X-ray L shoulder: 2/8/2023  FINDINGS:  Clavicle is intact.  AC joint is preserved.  Visualized proximal humerus is intact and normally located.  Visualized LEFT chest is unremarkable.     IMPRESSION:     No fracture or dislocation of LEFT shoulder.    Physical Exam:    Constitutional: Sitting up in bed and eating. Not in distress.  HENT: Normocephalic and atraumatic. Alopecia.  No rhinorrhea. Oropharynx is clear and moist.   Eyes: Conjunctivae are normal. EOMI.   Neck: Normal range of motion of neck, no adenopathy.    Cardiovascular: Tachycardic, regular rhythm. DP/radial pulses 2+, cap refill < 2 sec  Pulmonary/Chest: Effort normall. No respiratory distress. Symmetric expansion.  No crackles or wheezes.  Abdomen: Soft. Bowel sounds are normal. No distension and no mass. There is no hepatosplenomegaly.    Genitourinary:  Deferred  Musculoskeletal: Restriction in movement of L shoulder (both passive and active). No pint tenderness along clavicle, humeral head. No bruising or lump noted along the arm/forearm " muscles.  Neurological: Alert and oriented to person and place. Exhibits normal muscle tone bilaterally in upper and lower extremities. Gait normal. Coordination normal.    Skin: Skin is warm, dry and pink.  No rash or evidence of skin infection.    Psychiatric: Mood improved greatly from yesterday.    ASSESSMENT AND PLAN:     Tomas eJan-Baptiste is a 22 y.o. male who presented to the [facility] for Day 50 chemotherapy with Vincristine on 2/7/2023. During that appointment he was noted to febrile with chills and dehydrated. Hence, decision made to admit him to pediatric ott for management of febrile neutropenia and dehydration.    Today, he feels much improved. He is still febrile but cultures remain sterile. His nausea is under better control and he is able to eat today. No emesis. Denies diarrhea. Still c/o of L shoulder pain. X-ray of the shoulder remains unremarkable. If pain persists, will get MRI shoulder. Still tired but improving. Received pRBCs transfusion today and feeling much improved.      Febrile Neutropenia in an immunocompromised host:  - Previous history of gram negative septic shock  - Patient having fever with chills: no chills today  - Blood culture from port 2/7/2023: NGTD  - 1 L NS bolus x 1 in [facility]  - Continue D5 0.45 % NS at 100 ml/hr  - IV Cefepime x 1 in [facility], continue every 8 hrs  - Tylenol PO x 1 for fever in [facility], continue tylenol PO PRN for fever     Diarrhea: Likely from cytarabine vs enteritis: improving  - C.diff toxin assay : negative PCR ([id number] negative)   - Stool culture: pending  - Will continue to monitor     Nausea/Vomiting secondary to chemotherapy (consider enteritis also): improved  - Zofran IV x 1 in [facility] prior to VCR  - Continue Zofran scheduled every 8 hrs  - Continue to monitor     Left shoulder pain: likely musculoskeletal  - Norco and sling helping  - Encourage rest, warm packs  - X-ray L shoulder today  2/8/2023: not concerning  - If pain worsens, consider getting MRI      Retrosternal chest pain : likely from LLOYD, improved  - Continue famotidine 20 mg BID  - Continue to monitor     Ph+ Precursor B-Cell Acute Lymphoblastic Leukemia, in MRD Remission:  - 2-3 weeks of symptoms  - Presenting WBC > 440 k/uL, hyperleukocytosis  - Start of Hydroxyurea (1500 mg PO Q8) 2320 on 5/27/2022  - discontinued after only 55 hours  - No steroid pretreatment  - 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  - Testicular status NEGATIVE     - Flow cytometry of both 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, FISH positive for BCR-ABL1 translocation  - Enrolled on [facility] [id number]  - Initially enrolled on [facility] [id number] - but 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     - Enrolled on [id number] and began study 6/13/2022  - Started imatinib therapy 6/3/2022  - End of Induction IA and IB MRD negative  - Imatinib dose increased to 400 mg PO AM and 250 mg PM 9/29/2022  * Note:  All imatinib doses given inpatient rounded down to 600 mg        - Imatinib held for Septic Shock 12/27/2022-1/8/2023        - Imatinib 600 mg PO daily restarted 1/8/2023 inpatient        - Imatinib 400 mg PO QAM and 250 mg PO QHS 1/14/2023-1/17/2023        - Imatinib 600 mg PO QAM 1/17/2023 - present        [place] [id number], AR Arm B, Delayed Intensification, Day 51:   ** Vincristine 2 mg IV x1 given on 2/7/2023 in [facility]  ** Continue imatinib 600 mg PO Q AM                     Chemotherapy Related Pancytopenia:  - WBC 0.1, Hgb 6.6 gm/dL, platelets 20,000/microliters  - Transfuse for hemoglobin less than 7 gm/dL or with symptoms  - Transfuse for platelets less than 10,000/microliters or with symptoms  - S/p pRBCs transfusion today morning 2/8/2023  - CBC daily      At risk for deep vein thrombosis due to pegaspargase:  - Platelets still low  - Will hold off from starting apixaban     At Risk of Opportunistic Lung Infection:  - Bactrim  DS PO BID Sat and Sun for PJP prophylaxis     Sixth Cranial Nerve Palsy (IMPROVED/RESOLVED):  - Followed by Dr. Carranza      Right Sided Chest Wall Pain: improved   - Exam consistent with musculoskeletal strain  - Much improved, no pain with inhalation      Central Access:   - R Port-A-Cath in place      Research Participant:           [facility] - Source Data         Diagnosis: Ph+ Precursor B-Cell Acute Lymphoblastic Leukemia     Disease Status: EOI1A MRD NEGATIVE, EOI1B RD NEGATIVE, CNS3c, testicular negative, HSV1 IgG POSITIVE, CMV IgG NEGATIVE, VARICELLA IgG POSITIVE     Active Studies: [id number], [id number]                                                                                                      Inactive Studies: [id number]                                                                                                                                                Optional Studies: None             Protocol: International Phase 3 trial in [place] 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      Treatment Plan: [id number](OS), AR-Arm B, Delayed Intensification, Day 51     Height: 1.650 m      Weight: 64.2kg       BSA: 1.72 m²   (Delayed Intensification Day 29 1/17/2023)                                                                                                                                           Performance Status: Karnofsky 70, ECOG  2 (1/31/2023)     Treatment Plan Medications:  (100% adherent with imatinib)     Imatinib dose adjusted for weight at DI, Day 29 to Imatinib 600 mg PO daily in AM     Evaluations / Study Labs:  (2/8/2023) : None     Therapy Given: (2/8/2023):  Imatinib 600 mg PO QAM    Toxicities:  **Grade 2 fever (102.6 F at 2347 on 2/7/2023)  **Grade 3 febrile neutropenia on 2/7/2023 (ANC <1000/mm3 with a single temperature of >38.3 degrees C (101 degrees F) or  a sustained temperature of >=38 degrees C (100.4 degrees F) for more than one hour)  ** Grade 3 diarrhea on 2/6/2023 (Increase of >=7 stools per day  over baseline; hospitalization indicated, limiting self care ADL): Resolved 2/7/2023  ** Grade 2 diarrhea on 2/7/2023 (Increase of 4 - 6 stools per day  over baseline; limiting instrumental ADL) Resolved 2/8/2023  ** Grade 3 vomiting on 2/6/2023 and 2/7/2023 (hospitalization indicated) Resolved 2/8/2023        Disposition: Pending resolution of fever, rising ANC, resolution of emesis, improvement/resolution of diarrhea.     Alyce Duenas MD  Pediatric Hematology / Oncology  [facility]  Cell.  [phone]  [facility]. [phone]

## 2023-02-08 NOTE — PROGRESS NOTES
MD Update Note:    Patient presented to [facility] for Day 50 VCR. However, complained of chills, dizziness, emesis and diarrhea. Admitted to pediatric ott for management of febrile neutropenia., dehydration.      Alyce Duenas M.D.  Pediatric Oncology

## 2023-02-08 NOTE — CARE PLAN
The patient is Watcher - Medium risk of patient condition declining or worsening    Shift Goals  Clinical Goals: VSS, pain control, adequate intake and output  Patient Goals: go home, rest  Family Goals: updates on POC    Progress made toward(s) clinical / shift goals:    Problem: Knowledge Deficit - Standard  Goal: Patient and family/care givers will demonstrate understanding of plan of care, disease process/condition, diagnostic tests and medications  Outcome: Progressing     Problem: Psychosocial  Goal: Patient will experience minimized separation anxiety and fear  Outcome: Progressing     Problem: Respiratory  Goal: Patient will achieve/maintain optimum respiratory ventilation and gas exchange  Outcome: Progressing     Problem: Pain - Standard  Goal: Alleviation of pain or a reduction in pain to the patient’s comfort goal  Outcome: Progressing     Problem: Fall Risk  Goal: Patient will remain free from falls  Outcome: Progressing       Patient is not progressing towards the following goals:

## 2023-02-08 NOTE — PROGRESS NOTES
This Child Life Specialist knows this pt. Pt recently admitted to hospital, [facility] (Adult floor) and was here for around 3 weeks. Pt said she was pretty sick for the first 2 weeks, but was well taken care of.  Named a RN, Kirby that was great.  I did let pt know if he ever is on the adult floors and needs Child Life assistance he is welcome to have any of the staff call us to come see him, that we go all over the hospital. He appreciated that. Pt finished last semester (at [employer]).  He said he needs to focus on taking care of himself this semester, so is not doing school.  Emotional support provided and will continue throughout hospitalization. Pt is always pleasant to converse with.  Pt thanked me for coming to see him and offer things. Denied any needs at this time.

## 2023-02-09 ENCOUNTER — APPOINTMENT (OUTPATIENT)
Dept: OPHTHALMOLOGY | Facility: MEDICAL CENTER | Age: 23
End: 2023-02-09

## 2023-02-09 LAB
ANISOCYTOSIS BLD QL SMEAR: ABNORMAL
BACTERIA STL CULT: NORMAL
BARCODED ABORH UBTYP: 5100
BARCODED ABORH UBTYP: 5100
BARCODED PRD CODE UBPRD: NORMAL
BARCODED PRD CODE UBPRD: NORMAL
BARCODED UNIT NUM UBUNT: NORMAL
BARCODED UNIT NUM UBUNT: NORMAL
BASOPHILS # BLD AUTO: 0 % (ref 0–1.8)
BASOPHILS # BLD: 0 K/UL (ref 0–0.12)
C JEJUNI+C COLI AG STL QL: NORMAL
COMPONENT R 8504R: NORMAL
COMPONENT R 8504R: NORMAL
DACRYOCYTES BLD QL SMEAR: NORMAL
E COLI SXT1+2 STL IA: NORMAL
EOSINOPHIL # BLD AUTO: 0 K/UL (ref 0–0.51)
EOSINOPHIL NFR BLD: 0 % (ref 0–6.9)
ERYTHROCYTE [DISTWIDTH] IN BLOOD BY AUTOMATED COUNT: 45.2 FL (ref 35.9–50)
HCT VFR BLD AUTO: 20.2 % (ref 42–52)
HGB BLD-MCNC: 6.9 G/DL (ref 14–18)
LYMPHOCYTES # BLD AUTO: 0.08 K/UL (ref 1–4.8)
LYMPHOCYTES NFR BLD: 76.9 % (ref 22–41)
MANUAL DIFF BLD: NORMAL
MCH RBC QN AUTO: 28.6 PG (ref 27–33)
MCHC RBC AUTO-ENTMCNC: 34.2 G/DL (ref 33.7–35.3)
MCV RBC AUTO: 83.8 FL (ref 81.4–97.8)
MICROCYTES BLD QL SMEAR: ABNORMAL
MONOCYTES # BLD AUTO: 0.02 K/UL (ref 0–0.85)
MONOCYTES NFR BLD AUTO: 15.4 % (ref 0–13.4)
MORPHOLOGY BLD-IMP: NORMAL
NEUTROPHILS # BLD AUTO: 0.01 K/UL (ref 1.82–7.42)
NEUTROPHILS NFR BLD: 7.7 % (ref 44–72)
NRBC # BLD AUTO: 0 K/UL
NRBC BLD-RTO: 0 /100 WBC
PLATELET # BLD AUTO: 21 K/UL (ref 164–446)
PLATELET BLD QL SMEAR: NORMAL
PMV BLD AUTO: 12.1 FL (ref 9–12.9)
POIKILOCYTOSIS BLD QL SMEAR: NORMAL
PRODUCT TYPE UPROD: NORMAL
PRODUCT TYPE UPROD: NORMAL
RBC # BLD AUTO: 2.41 M/UL (ref 4.7–6.1)
RBC BLD AUTO: PRESENT
SCHISTOCYTES BLD QL SMEAR: NORMAL
SIGNIFICANT IND 70042: NORMAL
SITE SITE: NORMAL
SOURCE SOURCE: NORMAL
UNIT STATUS USTAT: NORMAL
UNIT STATUS USTAT: NORMAL
WBC # BLD AUTO: 0.1 K/UL (ref 4.8–10.8)

## 2023-02-09 PROCEDURE — 85007 BL SMEAR W/DIFF WBC COUNT: CPT

## 2023-02-09 PROCEDURE — 86945 BLOOD PRODUCT/IRRADIATION: CPT

## 2023-02-09 PROCEDURE — 770019 HCHG ROOM/CARE - PEDIATRIC ICU (20*

## 2023-02-09 PROCEDURE — 770001 HCHG ROOM/CARE - MED/SURG/GYN PRIV*

## 2023-02-09 PROCEDURE — 99232 SBSQ HOSP IP/OBS MODERATE 35: CPT | Performed by: PEDIATRICS

## 2023-02-09 PROCEDURE — A9270 NON-COVERED ITEM OR SERVICE: HCPCS | Performed by: PEDIATRICS

## 2023-02-09 PROCEDURE — 700105 HCHG RX REV CODE 258: Performed by: PEDIATRICS

## 2023-02-09 PROCEDURE — P9016 RBC LEUKOCYTES REDUCED: HCPCS

## 2023-02-09 PROCEDURE — 700102 HCHG RX REV CODE 250 W/ 637 OVERRIDE(OP): Performed by: PEDIATRICS

## 2023-02-09 PROCEDURE — 87040 BLOOD CULTURE FOR BACTERIA: CPT

## 2023-02-09 PROCEDURE — 86923 COMPATIBILITY TEST ELECTRIC: CPT

## 2023-02-09 PROCEDURE — 85025 COMPLETE CBC W/AUTO DIFF WBC: CPT

## 2023-02-09 PROCEDURE — 36430 TRANSFUSION BLD/BLD COMPNT: CPT

## 2023-02-09 PROCEDURE — 700111 HCHG RX REV CODE 636 W/ 250 OVERRIDE (IP): Performed by: PEDIATRICS

## 2023-02-09 RX ORDER — ACETAMINOPHEN 325 MG/1
650 TABLET ORAL EVERY 6 HOURS
Status: DISCONTINUED | OUTPATIENT
Start: 2023-02-09 | End: 2023-02-13

## 2023-02-09 RX ORDER — LORAZEPAM 2 MG/ML
0.5 INJECTION INTRAMUSCULAR EVERY 6 HOURS PRN
Status: DISCONTINUED | OUTPATIENT
Start: 2023-02-09 | End: 2023-03-01 | Stop reason: HOSPADM

## 2023-02-09 RX ADMIN — Medication 1000 UNITS: at 05:36

## 2023-02-09 RX ADMIN — FAMOTIDINE 20 MG: 20 TABLET ORAL at 04:00

## 2023-02-09 RX ADMIN — ONDANSETRON 8 MG: 2 INJECTION INTRAMUSCULAR; INTRAVENOUS at 23:51

## 2023-02-09 RX ADMIN — CHLORHEXIDINE GLUCONATE 0.12% ORAL RINSE 15 ML: 1.2 LIQUID ORAL at 16:37

## 2023-02-09 RX ADMIN — ACETAMINOPHEN 650 MG: 325 TABLET, FILM COATED ORAL at 01:03

## 2023-02-09 RX ADMIN — CHLORHEXIDINE GLUCONATE 0.12% ORAL RINSE 15 ML: 1.2 LIQUID ORAL at 20:07

## 2023-02-09 RX ADMIN — CEFEPIME 2 G: 2 INJECTION, POWDER, FOR SOLUTION INTRAVENOUS at 23:51

## 2023-02-09 RX ADMIN — CHLORHEXIDINE GLUCONATE 0.12% ORAL RINSE 15 ML: 1.2 LIQUID ORAL at 12:36

## 2023-02-09 RX ADMIN — IMATINIB MESYLATE 400 MG: 400 TABLET, FILM COATED ORAL at 05:36

## 2023-02-09 RX ADMIN — FAMOTIDINE 20 MG: 20 TABLET ORAL at 16:38

## 2023-02-09 RX ADMIN — CHLORHEXIDINE GLUCONATE 0.12% ORAL RINSE 15 ML: 1.2 LIQUID ORAL at 08:34

## 2023-02-09 RX ADMIN — CEFEPIME 2 G: 2 INJECTION, POWDER, FOR SOLUTION INTRAVENOUS at 01:15

## 2023-02-09 RX ADMIN — ACETAMINOPHEN 650 MG: 325 TABLET, FILM COATED ORAL at 19:32

## 2023-02-09 RX ADMIN — OXYCODONE HYDROCHLORIDE 5 MG: 5 TABLET ORAL at 19:32

## 2023-02-09 RX ADMIN — ONDANSETRON 8 MG: 2 INJECTION INTRAMUSCULAR; INTRAVENOUS at 08:34

## 2023-02-09 RX ADMIN — ACETAMINOPHEN 650 MG: 325 TABLET, FILM COATED ORAL at 08:34

## 2023-02-09 RX ADMIN — Medication 1 TABLET: at 05:36

## 2023-02-09 RX ADMIN — ACETAMINOPHEN 650 MG: 325 TABLET, FILM COATED ORAL at 14:20

## 2023-02-09 RX ADMIN — CEFEPIME 2 G: 2 INJECTION, POWDER, FOR SOLUTION INTRAVENOUS at 16:38

## 2023-02-09 RX ADMIN — CEFEPIME 2 G: 2 INJECTION, POWDER, FOR SOLUTION INTRAVENOUS at 08:40

## 2023-02-09 RX ADMIN — OXYCODONE HYDROCHLORIDE 5 MG: 5 TABLET ORAL at 01:03

## 2023-02-09 RX ADMIN — IMATINIB MESYLATE 200 MG: 100 TABLET, FILM COATED ORAL at 05:35

## 2023-02-09 RX ADMIN — ONDANSETRON 8 MG: 2 INJECTION INTRAMUSCULAR; INTRAVENOUS at 01:15

## 2023-02-09 RX ADMIN — ONDANSETRON 8 MG: 2 INJECTION INTRAMUSCULAR; INTRAVENOUS at 16:37

## 2023-02-09 RX ADMIN — OXYCODONE HYDROCHLORIDE 5 MG: 5 TABLET ORAL at 08:34

## 2023-02-09 RX ADMIN — DEXTROSE AND SODIUM CHLORIDE: 5; 450 INJECTION, SOLUTION INTRAVENOUS at 14:21

## 2023-02-09 ASSESSMENT — PAIN DESCRIPTION - PAIN TYPE
TYPE: ACUTE PAIN

## 2023-02-09 NOTE — PROGRESS NOTES
Assumed care of pt. Recieved report from day RNAshley. Pt. sitting up in bed, on room air and has no apparent signs of respiratory distress at this time. No one at bedside with patient. Updated white board.       Pt demonstrates ability to turn self in bed without assistance of staff. Patient and family understands importance in prevention of skin breakdown, ulcers, and potential infection. Hourly rounding in effect. RN skin check complete.   Devices in place include: Cardiac leads, BP cuff,  sticker, Rt chest port.  Skin assessed under devices: Yes.  Confirmed HAPI identified on the following date: NA   Location of HAPI: NA.  Wound Care RN following: No.  The following interventions are in place: Pt able to reposition self in bed. Encouraged to safely ambulate.

## 2023-02-09 NOTE — DISCHARGE PLANNING
Case Management Discharge Planning      Medical records reviewed by this RN Case Manager. Pt admitted inpatient to acute care pediatrics with febrile neutropenia in an immunocompromised host. Patient lives with mom and siblings in [place]. JULY's insurance is through [employer] HPN/Medicaid HMO/HPN Medicaid. His PCP is listed as Margarito Arvizu MD and he sees Pediatric Hematology/Oncology for his cancer treatment. Pt to be discharged home twhen medically cleared. No CM needs noted at this time. Will continue to follow for discharge needs.

## 2023-02-09 NOTE — CARE PLAN
The patient is Watcher - Medium risk of patient condition declining or worsening    Shift Goals  Clinical Goals: VSS, pain control, adequate I&O's, IV abx  Patient Goals: sleep, pain control  Family Goals: updates on POC, rest    Progress made toward(s) clinical / shift goals:    Problem: Knowledge Deficit - Standard  Goal: Patient and family/care givers will demonstrate understanding of plan of care, disease process/condition, diagnostic tests and medications  Outcome: Progressing     Problem: Respiratory  Goal: Patient will achieve/maintain optimum respiratory ventilation and gas exchange  Outcome: Progressing     Problem: Nutrition - Standard  Goal: Patient's nutritional and fluid intake will be adequate or improve  Outcome: Progressing     Problem: Pain - Standard  Goal: Alleviation of pain or a reduction in pain to the patient’s comfort goal  Outcome: Progressing     Problem: Fall Risk  Goal: Patient will remain free from falls  Outcome: Progressing       Patient is not progressing towards the following goals:

## 2023-02-09 NOTE — PROGRESS NOTES
0700: Received report from RNMateo and assumed care of patient. Patient resting and appears comfortable at this time, no signs/symptoms of pain/distress noted. Patient on room air, central monitor in use to monitor vital signs continuously. Discussed POC all questions answered at this time. Fall precautions in place, bed locked in lowest position, call light within reach.     Pt demonstrates ability to turn self in bed without assistance of staff. Patient and family understands importance in prevention of skin breakdown, ulcers, and potential infection. Hourly rounding in effect. RN skin check complete.   Devices in place include: chest port, pulse ox, ECG leads x3, BP cuff.  Skin assessed under devices: Yes.  Confirmed HAPI identified on the following date: NA   Location of HAPI: NA.  Wound Care RN following: No.  The following interventions are in place: Patient repositions self as needed, pillows in use for support/repositioning.

## 2023-02-10 ENCOUNTER — APPOINTMENT (OUTPATIENT)
Dept: RADIOLOGY | Facility: MEDICAL CENTER | Age: 23
DRG: 500 | End: 2023-02-10
Attending: PEDIATRICS
Payer: COMMERCIAL

## 2023-02-10 LAB
ALBUMIN SERPL BCP-MCNC: 2.8 G/DL (ref 3.2–4.9)
ALBUMIN/GLOB SERPL: 1.6 G/DL
ALP SERPL-CCNC: 147 U/L (ref 30–99)
ALT SERPL-CCNC: 19 U/L (ref 2–50)
ANION GAP SERPL CALC-SCNC: 6 MMOL/L (ref 7–16)
AST SERPL-CCNC: 17 U/L (ref 12–45)
BASOPHILS # BLD AUTO: 0 % (ref 0–1.8)
BASOPHILS # BLD: 0 K/UL (ref 0–0.12)
BILIRUB SERPL-MCNC: 0.7 MG/DL (ref 0.1–1.5)
BUN SERPL-MCNC: 3 MG/DL (ref 8–22)
CALCIUM ALBUM COR SERPL-MCNC: 9 MG/DL (ref 8.5–10.5)
CALCIUM SERPL-MCNC: 8 MG/DL (ref 8.5–10.5)
CHLORIDE SERPL-SCNC: 104 MMOL/L (ref 96–112)
CO2 SERPL-SCNC: 27 MMOL/L (ref 20–33)
CREAT SERPL-MCNC: 0.38 MG/DL (ref 0.5–1.4)
EOSINOPHIL # BLD AUTO: 0 K/UL (ref 0–0.51)
EOSINOPHIL NFR BLD: 0 % (ref 0–6.9)
ERYTHROCYTE [DISTWIDTH] IN BLOOD BY AUTOMATED COUNT: 46.1 FL (ref 35.9–50)
GFR SERPLBLD CREATININE-BSD FMLA CKD-EPI: 161 ML/MIN/1.73 M 2
GLOBULIN SER CALC-MCNC: 1.8 G/DL (ref 1.9–3.5)
GLUCOSE SERPL-MCNC: 93 MG/DL (ref 65–99)
HCT VFR BLD AUTO: 26.4 % (ref 42–52)
HGB BLD-MCNC: 8.9 G/DL (ref 14–18)
LYMPHOCYTES # BLD AUTO: 0.12 K/UL (ref 1–4.8)
LYMPHOCYTES NFR BLD: 57.9 % (ref 22–41)
MANUAL DIFF BLD: NORMAL
MCH RBC QN AUTO: 28.7 PG (ref 27–33)
MCHC RBC AUTO-ENTMCNC: 33.7 G/DL (ref 33.7–35.3)
MCV RBC AUTO: 85.2 FL (ref 81.4–97.8)
MONOCYTES # BLD AUTO: 0.01 K/UL (ref 0–0.85)
MONOCYTES NFR BLD AUTO: 5.3 % (ref 0–13.4)
MORPHOLOGY BLD-IMP: NORMAL
NEUTROPHILS # BLD AUTO: 0.07 K/UL (ref 1.82–7.42)
NEUTROPHILS NFR BLD: 36.8 % (ref 44–72)
NRBC # BLD AUTO: 0 K/UL
NRBC BLD-RTO: 0 /100 WBC
PLATELET # BLD AUTO: 23 K/UL (ref 164–446)
PLATELET BLD QL SMEAR: NORMAL
PMV BLD AUTO: 10.5 FL (ref 9–12.9)
POTASSIUM SERPL-SCNC: 3.8 MMOL/L (ref 3.6–5.5)
PROT SERPL-MCNC: 4.6 G/DL (ref 6–8.2)
RBC # BLD AUTO: 3.1 M/UL (ref 4.7–6.1)
RBC BLD AUTO: NORMAL
SODIUM SERPL-SCNC: 137 MMOL/L (ref 135–145)
WBC # BLD AUTO: 0.2 K/UL (ref 4.8–10.8)

## 2023-02-10 PROCEDURE — 770001 HCHG ROOM/CARE - MED/SURG/GYN PRIV*

## 2023-02-10 PROCEDURE — 87799 DETECT AGENT NOS DNA QUANT: CPT

## 2023-02-10 PROCEDURE — A9270 NON-COVERED ITEM OR SERVICE: HCPCS | Performed by: PEDIATRICS

## 2023-02-10 PROCEDURE — 87496 CYTOMEG DNA AMP PROBE: CPT

## 2023-02-10 PROCEDURE — 700111 HCHG RX REV CODE 636 W/ 250 OVERRIDE (IP): Performed by: PEDIATRICS

## 2023-02-10 PROCEDURE — 99222 1ST HOSP IP/OBS MODERATE 55: CPT | Performed by: ORTHOPAEDIC SURGERY

## 2023-02-10 PROCEDURE — 700102 HCHG RX REV CODE 250 W/ 637 OVERRIDE(OP): Performed by: PEDIATRICS

## 2023-02-10 PROCEDURE — 700105 HCHG RX REV CODE 258: Performed by: PEDIATRICS

## 2023-02-10 PROCEDURE — A9579 GAD-BASE MR CONTRAST NOS,1ML: HCPCS | Performed by: PEDIATRICS

## 2023-02-10 PROCEDURE — 700117 HCHG RX CONTRAST REV CODE 255: Performed by: PEDIATRICS

## 2023-02-10 PROCEDURE — 80053 COMPREHEN METABOLIC PANEL: CPT

## 2023-02-10 PROCEDURE — 85007 BL SMEAR W/DIFF WBC COUNT: CPT

## 2023-02-10 PROCEDURE — 85025 COMPLETE CBC W/AUTO DIFF WBC: CPT

## 2023-02-10 PROCEDURE — 73223 MRI JOINT UPR EXTR W/O&W/DYE: CPT | Mod: LT

## 2023-02-10 PROCEDURE — 99232 SBSQ HOSP IP/OBS MODERATE 35: CPT | Performed by: PEDIATRICS

## 2023-02-10 PROCEDURE — 87529 HSV DNA AMP PROBE: CPT | Mod: 91

## 2023-02-10 RX ADMIN — IMATINIB MESYLATE 200 MG: 100 TABLET, FILM COATED ORAL at 06:32

## 2023-02-10 RX ADMIN — ACETAMINOPHEN 650 MG: 325 TABLET, FILM COATED ORAL at 01:14

## 2023-02-10 RX ADMIN — Medication 1 TABLET: at 06:33

## 2023-02-10 RX ADMIN — CHLORHEXIDINE GLUCONATE 0.12% ORAL RINSE 15 ML: 1.2 LIQUID ORAL at 20:32

## 2023-02-10 RX ADMIN — OXYCODONE HYDROCHLORIDE 10 MG: 5 TABLET ORAL at 22:05

## 2023-02-10 RX ADMIN — FAMOTIDINE 20 MG: 20 TABLET ORAL at 16:06

## 2023-02-10 RX ADMIN — VANCOMYCIN HYDROCHLORIDE 1500 MG: 500 INJECTION, POWDER, LYOPHILIZED, FOR SOLUTION INTRAVENOUS at 20:32

## 2023-02-10 RX ADMIN — OXYCODONE HYDROCHLORIDE 5 MG: 5 TABLET ORAL at 01:14

## 2023-02-10 RX ADMIN — CEFEPIME 2 G: 2 INJECTION, POWDER, FOR SOLUTION INTRAVENOUS at 07:39

## 2023-02-10 RX ADMIN — ACETAMINOPHEN 650 MG: 325 TABLET, FILM COATED ORAL at 14:12

## 2023-02-10 RX ADMIN — ONDANSETRON 8 MG: 2 INJECTION INTRAMUSCULAR; INTRAVENOUS at 07:40

## 2023-02-10 RX ADMIN — GADOTERIDOL 12 ML: 279.3 INJECTION, SOLUTION INTRAVENOUS at 13:28

## 2023-02-10 RX ADMIN — CHLORHEXIDINE GLUCONATE 0.12% ORAL RINSE 15 ML: 1.2 LIQUID ORAL at 07:40

## 2023-02-10 RX ADMIN — IBUPROFEN 600 MG: 200 TABLET, FILM COATED ORAL at 16:36

## 2023-02-10 RX ADMIN — DEXTROSE AND SODIUM CHLORIDE: 5; 450 INJECTION, SOLUTION INTRAVENOUS at 02:40

## 2023-02-10 RX ADMIN — IMATINIB MESYLATE 400 MG: 400 TABLET, FILM COATED ORAL at 06:33

## 2023-02-10 RX ADMIN — ONDANSETRON 8 MG: 2 INJECTION INTRAMUSCULAR; INTRAVENOUS at 16:06

## 2023-02-10 RX ADMIN — CHLORHEXIDINE GLUCONATE 0.12% ORAL RINSE 15 ML: 1.2 LIQUID ORAL at 16:07

## 2023-02-10 RX ADMIN — ACETAMINOPHEN 650 MG: 325 TABLET, FILM COATED ORAL at 07:39

## 2023-02-10 RX ADMIN — CEFEPIME 2 G: 2 INJECTION, POWDER, FOR SOLUTION INTRAVENOUS at 16:07

## 2023-02-10 RX ADMIN — Medication 1000 UNITS: at 06:33

## 2023-02-10 RX ADMIN — FAMOTIDINE 20 MG: 20 TABLET ORAL at 04:01

## 2023-02-10 RX ADMIN — OXYCODONE HYDROCHLORIDE 10 MG: 5 TABLET ORAL at 16:06

## 2023-02-10 RX ADMIN — OXYCODONE HYDROCHLORIDE 5 MG: 5 TABLET ORAL at 10:30

## 2023-02-10 RX ADMIN — ACETAMINOPHEN 650 MG: 325 TABLET, FILM COATED ORAL at 20:31

## 2023-02-10 ASSESSMENT — PAIN DESCRIPTION - PAIN TYPE
TYPE: ACUTE PAIN

## 2023-02-10 NOTE — PROGRESS NOTES
Pediatric Hematology/Oncology  Daily Progress Note      Patient Name:  Tomas Jean-Baptiste  : 2000  MRN: [id number]    Location of Service:  [facility]  Date of Service: 2023  Time: 8:36 PM    Hospital Day: 3    Protocol / Treatment Plan: Ness Chromosome Precursor B-Cell Acute Lymphoblastic Leukemia, ON STUDY [id number], Delayed Intensification, Day 52    SUBJECTIVE:     Continues to have fever with Tmax of 101.5 F at 1010. Also, with chills. Repeat blood culture from port obtained. Remains on empiric cefepime. Tachycardia improving. Receiving another unit of pRBCs transfusion today for low hemoglobin. Still with L shoulder pain which improves after taking oxycodone. Nausea well controlled and appetite improving. No emesis. Ate noodles yesterday. No reports of diarrhea overnight or this morning. No abdominal pain/cramps. No neurologic changes. No easy bruising or rash.     Review of Systems:     Constitutional: Febrile, tired. Improving appetite.  HENT: Negative for auditory changes or ear pain, no nosebleeds, no congestion and rhinorrhea, no sore throat.  No mouth sores.  Eyes: Negative for visual changes.  Respiratory: Negative for shortness of breath or noisy breathing.   Cardiovascular: Negative for chest pain and leg swelling.    Gastrointestinal: Negative for abdominal pain, constipation and blood in stool.  Previously reported nausea/vomiting and diarrhea under control.  Genitourinary: Negative for dysuria.  Musculoskeletal: Positive for L shoulder pain.  Skin: Negative for rash or skin infection.  Neurological: Negative for numbness, tingling, sensory changes, weakness or headaches.    Endo/Heme/Allergies: No bruising/bleeding easily.    Psychiatric/Behavioral: Somewhat flat affect    OBJECTIVE:     Max Temp: Temp (24hrs), Av.6 °C (99.6 °F), Min:36.7 °C (98 °F), Max:39.2 °C (102.6 °F)      Vitals: /75   Pulse (!) 129   Temp (!) 39.1 °C  "(102.4 °F) (Oral)   Resp 18   Ht 1.651 m (5' 5\")   Wt 62 kg (136 lb 11 oz)   SpO2 95%   BMI 22.75 kg/m²     I/O:   Intake/Output Summary (Last 24 hours) at 2/9/2023 2036  Last data filed at 2/9/2023 1600  Gross per 24 hour   Intake 2590.96 ml   Output 2925 ml   Net -334.04 ml         Labs:    Most Recent Hematology Labs:    Recent Labs     02/07/23  1140 02/08/23  0300 02/09/23  0325   WBC 0.1* 0.1* 0.1*   RBC 2.82* 2.32* 2.41*   HEMOGLOBIN 7.9* 6.6* 6.9*   HEMATOCRIT 23.3* 19.6* 20.2*   MCV 82.6 84.5 83.8   MCH 28.0 28.4 28.6   RDW 45.4 45.9 45.2   PLATELETCT 22* 20* 21*   MPV 12.5 11.0 12.1   NEUTSPOLYS 14.30*  --  7.70*   LYMPHOCYTES 85.70*  --  76.90*   MONOCYTES 0.00  --  15.40*   EOSINOPHILS 0.00  --  0.00   BASOPHILS 0.00  --  0.00   RBCMORPHOLO Present  --  Present         Most Recent Metabolic Panel:    Lab Results   Component Value Date/Time    POTASSIUM 3.6 02/07/2023 1140    CHLORIDE 97 02/07/2023 1140    CO2 25 02/07/2023 1140    GLUCOSE 99 02/07/2023 1140    BUN 9 02/07/2023 1140    CREATININE 0.41 (L) 02/07/2023 1140    CALCIUM 8.6 02/07/2023 1140    ANION 10.0 02/07/2023 1140       Blood culture 2/7/2022: NGTD    X-ray L shoulder: 2/8/2023  FINDINGS:  Clavicle is intact.  AC joint is preserved.  Visualized proximal humerus is intact and normally located.  Visualized LEFT chest is unremarkable.     IMPRESSION:     No fracture or dislocation of LEFT shoulder.    Physical Exam:    Constitutional: Sitting up in bed. Not in distress.  HENT: Normocephalic and atraumatic. Alopecia.  No rhinorrhea. Oropharynx is clear and moist.   Eyes: Conjunctivae are normal. EOMI.   Neck: Normal range of motion of neck, no adenopathy.    Cardiovascular: Tachycardic, regular rhythm. DP/radial pulses 2+, cap refill < 2 sec  Pulmonary/Chest: Effort normall. No respiratory distress. Symmetric expansion.  No crackles or wheezes.  Abdomen: Soft. Bowel sounds are normal. No distension and no mass. There is no " hepatosplenomegaly.    Genitourinary:  Deferred  Musculoskeletal: Restriction in movement of L shoulder (both passive and active). No point tenderness along clavicle, humeral head. No bruising or lump noted along the arm/forearm muscles.  Neurological: Alert and oriented to person and place. Exhibits normal muscle tone bilaterally in upper and lower extremities. Gait normal. Coordination normal.    Skin: Skin is warm, dry and pink.  No rash or evidence of skin infection.    Psychiatric: Mood improved greatly from yesterday.    ASSESSMENT AND PLAN:     Tomas Jean-Baptiste is a 22 y.o. male who presented to the [facility] for Day 50 chemotherapy with Vincristine on 2/7/2023. During that appointment he was noted to febrile with chills and dehydrated. Hence, decision made to admit him to pediatric ott for management of febrile neutropenia and dehydration.    Overall improving, however continues to be febrile with chills. On Scheduled tylenol. Repeat blood culture obtained today. Remains on empiric IV cefepime. Transfusion dependant. His nausea is under better control and he is able to eat today. No emesis. Denies diarrhea. Still c/o of L shoulder pain. Switched from Norco to oxycodone PRN. X-ray of the shoulder remains unremarkable. If pain persists, will get MRI shoulder. Still tired but improving.      Febrile Neutropenia in an immunocompromised host:  - Previous history of gram negative septic shock  - Patient having fever with chills  - Blood culture from port 2/7/2023: NGTD  - Repeat blood culture from port 2/9/2023: Pending  - 1 L NS bolus x 1 in CIS  - Continue D5 0.45 % NS at 100 ml/hr  - IV Cefepime x 1 in CIS, continue every 8 hrs  - Tylenol PO x 1 for fever in CIS, will schedule tylenol every 6 hrs today 2/9/2023     Diarrhea: Likely from cytarabine vs enteritis: Resolved  - C.diff toxin assay : negative PCR ([id number] negative)   - Stool  culture: NGTD  - Will continue to monitor     Nausea/Vomiting secondary to chemotherapy: resolved  - Zofran IV x 1 in CIS prior to VCR  - Continue Zofran scheduled every 8 hrs  - Continue to monitor     Left shoulder pain: likely musculoskeletal  - Pain medication and sling helping  - Norco discontinued, Switched to oxycodone today 2/9/2023  - Encourage rest, warm packs  - X-ray L shoulder today 2/8/2023: not concerning  - If pain worsens, consider getting MRI      Retrosternal chest pain : likely from LLOYD, improved  - Continue famotidine 20 mg BID  - Continue to monitor     Ph+ Precursor B-Cell Acute Lymphoblastic Leukemia, in MRD Remission:  - 2-3 weeks of symptoms  - Presenting WBC > 440 k/uL, hyperleukocytosis  - Start of Hydroxyurea (1500 mg PO Q8) 2320 on 5/27/2022  - discontinued after only 55 hours  - No steroid pretreatment  - 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 [id number]  - Initially enrolled on [facility] [id number] - but 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     - Enrolled on [facility] [id number] and began study 6/13/2022  - Started imatinib therapy 6/3/2022  - End of Induction IA and IB MRD negative  - Imatinib dose increased to 400 mg PO AM and 250 mg PM 9/29/2022  * Note:  All imatinib doses given inpatient rounded down to 600 mg        - Imatinib held for Septic Shock 12/27/2022-1/8/2023        - Imatinib 600 mg PO daily restarted 1/8/2023 inpatient        - Imatinib 400 mg PO QAM and 250 mg PO QHS 1/14/2023-1/17/2023        - Imatinib 600 mg PO QAM 1/17/2023 - present        [id number], AR Arm B, Delayed Intensification, Day 52:   ** Vincristine 2 mg IV x1 given on 2/7/2023 in C[facility]  ** Continue imatinib 600 mg PO Q AM                     Chemotherapy Related Pancytopenia:  - WBC 0.1, Hgb 6.9 gm/dL,  platelets 21,000/microliters  - Transfuse for hemoglobin less than 7 gm/dL or with symptoms  - Transfuse for platelets less than 10,000/microliters or with symptoms  - S/p pRBCs transfusion on 2/8/2023, will transfuse another unit of pRBCs today 2/9/2023  - CBC daily      At risk for deep vein thrombosis due to pegaspargase:  - Platelets still low  - Will hold off from starting apixaban     At Risk of Opportunistic Lung Infection:  - Bactrim DS PO BID Sat and Sun for PJP prophylaxis     Sixth Cranial Nerve Palsy (IMPROVED/RESOLVED):  - Followed by Dr. Carranza      Right Sided Chest Wall Pain: improved   - Exam consistent with musculoskeletal strain  - Much improved, no pain with inhalation      Central Access:   - R Port-A-Cath in place      Research Participant:           [facility] - Source Data         Diagnosis: Ph+ Precursor B-Cell Acute Lymphoblastic Leukemia     Disease Status: EOI1A MRD NEGATIVE, EOI1B RD NEGATIVE, CNS3c, testicular negative, HSV1 IgG POSITIVE, CMV IgG NEGATIVE, VARICELLA IgG POSITIVE     Active Studies: [id number], [id number]                                                                                                      Inactive Studies: [id number]                                                                                                                                                Optional Studies: None             Protocol: International Phase 3 trial in [place] 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      Treatment Plan: [id number](OS), AR-Arm B, Delayed Intensification, Day 52     Height: 1.650 m      Weight: 64.2kg       BSA: 1.72 m²   (Delayed Intensification Day 29 1/17/2023)                                                                                                                                           Performance Status: Karnofsky 70, ECOG  2  (1/31/2023)     Treatment Plan Medications:  (100% adherent with imatinib)     Imatinib dose adjusted for weight at DI, Day 29 to Imatinib 600 mg PO daily in AM     Evaluations / Study Labs:  (2/8/2023) : None     Therapy Given: (2/8/2023):  Imatinib 600 mg PO QAM    Toxicities:  **Grade 3 febrile neutropenia on 2/7/2023 (ANC <1000/mm3 with a single temperature of >38.3 degrees C (101 degrees F) or a sustained temperature of >=38 degrees C (100.4 degrees F) for more than one hour)  ** Grade 3 diarrhea on 2/6/2023 (Increase of >=7 stools per day  over baseline; hospitalization indicated, limiting self care ADL): Resolved 2/7/2023  ** Grade 2 diarrhea on 2/7/2023 (Increase of 4 - 6 stools per day  over baseline; limiting instrumental ADL) Resolved 2/8/2023  ** Grade 3 vomiting on 2/6/2023 and 2/7/2023 (hospitalization indicated) Resolved 2/8/2023        Disposition: Pending resolution of fever, rising ANC, resolution of emesis, improvement/resolution of diarrhea.     Alyce Duenas MD  Pediatric Hematology / Oncology  [facility]  Cell.  [phone]  [facility] [phone]

## 2023-02-10 NOTE — PROGRESS NOTES
Pediatric Hematology/Oncology  Daily Progress Note      Patient Name:  Tomas Jean-Baptiste  : 2000  MRN: [id number]    Location of Service:  [facility]  Date of Service: 2/10/2023  Time: 2:41 PM    Hospital Day: 4    Protocol / Treatment Plan: [place] Chromosome Precursor B-Cell Acute Lymphoblastic Leukemia, ON STUDY [id number], Delayed Intensification, Day 53    SUBJECTIVE:     Continues to have fever with Tmax of 102.4 F at 1010. Also, with chills. Repeat blood culture from port obtained on 2023 remains sterile. Remains on empiric cefepime. Tachycardia improving. Receiving another unit of pRBCs transfusion yesterday for low hemoglobin. Hypoxic to 85-86% overnight requiring 0.5 L oxygen. However, on RA in the morning. Still with L shoulder pain which improves after taking oxycodone. Denies need for PCA or lidocaine patch. Underwent MRI of L shoulder. Nausea well controlled and appetite improving. No emesis.  No reports of diarrhea overnight or this morning. No abdominal pain/cramps. No neurologic changes. No easy bruising or rash.     Review of Systems:     Constitutional: Febrile, tired. Improving appetite.  HENT: Negative for auditory changes or ear pain, no nosebleeds, no congestion and rhinorrhea, no sore throat.  No mouth sores.  Eyes: Negative for visual changes.  Respiratory: Negative for shortness of breath or noisy breathing.   Cardiovascular: Negative for chest pain and leg swelling.    Gastrointestinal: Negative for abdominal pain, constipation and blood in stool.  Previously reported nausea/vomiting and diarrhea under control.  Genitourinary: Negative for dysuria.  Musculoskeletal: Positive for L shoulder pain.  Skin: Negative for rash or skin infection.  Neurological: Negative for numbness, tingling, sensory changes, weakness or headaches.    Endo/Heme/Allergies: No bruising/bleeding easily.    Psychiatric/Behavioral: Somewhat flat  "affect    OBJECTIVE:     Max Temp: Temp (24hrs), Av.6 °C (99.6 °F), Min:36.7 °C (98 °F), Max:39.2 °C (102.6 °F)      Vitals: /60   Pulse (!) 122   Temp (!) 38.2 °C (100.7 °F) (Temporal)   Resp 16   Ht 1.651 m (5' 5\")   Wt 62 kg (136 lb 11 oz)   SpO2 92%   BMI 22.75 kg/m²     I/O:   Intake/Output Summary (Last 24 hours) at 2/10/2023 1441  Last data filed at 2/10/2023 1200  Gross per 24 hour   Intake 2722.91 ml   Output 2800 ml   Net -77.09 ml         Labs:    Most Recent Hematology Labs:    Recent Labs     23  0300 23  0325 02/10/23  0745   WBC 0.1* 0.1* 0.2*   RBC 2.32* 2.41* 3.10*   HEMOGLOBIN 6.6* 6.9* 8.9*   HEMATOCRIT 19.6* 20.2* 26.4*   MCV 84.5 83.8 85.2   MCH 28.4 28.6 28.7   RDW 45.9 45.2 46.1   PLATELETCT 20* 21* 23*   MPV 11.0 12.1 10.5   NEUTSPOLYS  --  7.70* 36.80*   LYMPHOCYTES  --  76.90* 57.90*   MONOCYTES  --  15.40* 5.30   EOSINOPHILS  --  0.00 0.00   BASOPHILS  --  0.00 0.00   RBCMORPHOLO  --  Present Normal           Most Recent Metabolic Panel:     Latest Reference Range & Units 02/10/23 07:45   Sodium 135 - 145 mmol/L 137   Potassium 3.6 - 5.5 mmol/L 3.8   Chloride 96 - 112 mmol/L 104   Co2 20 - 33 mmol/L 27   Anion Gap 7.0 - 16.0  6.0 (L)   Glucose 65 - 99 mg/dL 93   Bun 8 - 22 mg/dL 3 (L)   Creatinine 0.50 - 1.40 mg/dL 0.38 (L)   GFR (CKD-EPI) >60 mL/min/1.73 m 2 161   Calcium 8.5 - 10.5 mg/dL 8.0 (L)   Correct Calcium 8.5 - 10.5 mg/dL 9.0   AST(SGOT) 12 - 45 U/L 17   ALT(SGPT) 2 - 50 U/L 19   Alkaline Phosphatase 30 - 99 U/L 147 (H)   Total Bilirubin 0.1 - 1.5 mg/dL 0.7   Albumin 3.2 - 4.9 g/dL 2.8 (L)   Total Protein 6.0 - 8.2 g/dL 4.6 (L)   Globulin 1.9 - 3.5 g/dL 1.8 (L)   A-G Ratio g/dL 1.6       Blood culture 2022 (port): NGTD  Blood culture from 2023 (port): NGTD  CMV PCR 2023: pending  EBV PCR 2023: Pending  HSV PCR 2023: Pending    X-ray L shoulder: 2023  FINDINGS:  Clavicle is intact.  AC joint is preserved.  Visualized proximal " humerus is intact and normally located.  Visualized LEFT chest is unremarkable.     IMPRESSION:     No fracture or dislocation of LEFT shoulder.    MRI L shoulder: 2/10/2023:  1.  Diffuse abnormal marrow signal involving the visualized portions of the humerus, scapula and clavicle characterized by mottled serpiginous heterogeneous areas of increased T2 and decreased T1 signal. This most likely is related to the patient's   history of leukemia and represents a diffuse marrow disorder.     2.  Some periosteal enhancement surrounding the proximal humeral diaphysis and metaphysis with some slight cortical irregularity posteriorly and medially. This may be related to neoplastic process however infectious process could potentially have this   appearance as well.     3.  Diffuse edema and enhancement of the deltoid, latissimus dorsi, short head of the biceps, coracobrachialis, supraspinatus, infraspinatus and subscapularis muscles with a small amount of intermuscular edema/fluid suggesting myositis.     4.  No focal abscess.     5.  Small glenohumeral joint effusion.     6.  No evidence of fracture.    Physical Exam:    Constitutional: Sitting up in bed. C/o L shoulder pain, Tired.  HENT: Normocephalic and atraumatic. Alopecia.  No rhinorrhea. Oropharynx is clear and moist.   Eyes: Conjunctivae are normal. EOMI.   Neck: Normal range of motion of neck, no adenopathy.    Cardiovascular: Tachycardic, regular rhythm. DP/radial pulses 2+, cap refill < 2 sec  Pulmonary/Chest: Effort normall. No respiratory distress. Symmetric expansion.  No crackles or wheezes. PAC to chest wall. Dressing C/D/I.  Abdomen: Soft. Bowel sounds are normal. No distension and no mass. There is no hepatosplenomegaly.    Genitourinary:  Deferred  Musculoskeletal: Restriction in movement of L shoulder (both passive and active). No point tenderness along clavicle, humeral head. No bruising or lump noted along the arm/forearm muscles.  Neurological: Alert  and oriented to person and place. Exhibits normal muscle tone bilaterally in upper and lower extremities. Gait normal. Coordination normal.    Skin: Skin is warm, dry and pink.  No rash or evidence of skin infection.    Psychiatric: Mood improved greatly from yesterday.    ASSESSMENT AND PLAN:     Tomas Jean-Baptiste is a 22 y.o. male who presented to the [facility] for Day 50 chemotherapy with Vincristine on 2/7/2023. During that appointment he was noted to febrile with chills and dehydrated. Hence, decision made to admit him to pediatric ott for management of febrile neutropenia and dehydration.    Overall feeling tired. Continues to be febrile with chills. On Scheduled tylenol. Repeat blood culture obtained on 2/9/2023. Remains on empiric IV cefepime. Transfusion dependant. His nausea is under better control and he is able to eat today. No emesis. Denies diarrhea. Still c/o of L shoulder pain. Switched from Norco to oxycodone PRN. X-ray of the shoulder remains unremarkable. Obtained MRI today which shows diffuse myositis and some intermuscular edema/fluid. Consulted pediatric orthopedic surgeon on call for input.     Febrile Neutropenia in an immunocompromised host: Etiology not clear: Counts trending up which is encouraging  - Previous history of gram negative septic shock  - Patient having fever with chills  - Blood culture from port 2/7/2023: NGTD  - Repeat blood culture from port 2/9/2023: NGTD  - 1 L NS bolus x 1 in CIS  - Initially on D5 0.45 % NS at 100 ml/hr, decreased to 50 ml/hr  - IV Cefepime x 1 in CIS, continue every 8 hrs  - Tylenol PO x 1 for fever in CIS  - Scheduled tylenol every 6 hrs since 2/9/2023     Left shoulder pain:   - Pain medication and sling helping  - Norco discontinued, Switched to oxycodone on 2/9/2023  - Patient denying PCA or lidoderm patch  - Encourage rest, warm packs  - X-ray L shoulder today 2/8/2023: not  concerning  - MRI shoulder 2/10/2023: showing diffuse myositis and some intermuscular edema/fluid  - Orthopedic surgeon consulted: Appreciate recs    Diarrhea: Likely from cytarabine vs enteritis: Resolved  - C.diff toxin assay : negative PCR ([id number] negative)   - Stool culture: NGTD  - Will continue to monitor     Nausea/Vomiting secondary to chemotherapy: resolved  - Zofran IV x 1 in CIS prior to VCR  - Continue Zofran scheduled every 8 hrs  - Continue to monitor     Retrosternal chest pain : likely from LLOYD, improved  - Continue famotidine 20 mg BID  - Continue to monitor    Hypoxemia: atelectasis vs opioid effect  - 85-86% with deep sleep  - Requiring 0.5 L oxygen at night   - On RA in the morning  - Will discontinue IVF and let patient move around as tolerated  - If happens again, will get CXR to r/o pleural effusion/edema, consider lasix IV x 1     Ph+ Precursor B-Cell Acute Lymphoblastic Leukemia, in MRD Remission:  - 2-3 weeks of symptoms  - Presenting WBC > 440 k/uL, hyperleukocytosis  - Start of Hydroxyurea (1500 mg PO Q8) 2320 on 5/27/2022  - discontinued after only 55 hours  - No steroid pretreatment  - 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  - Testicular status NEGATIVE     - Flow cytometry of both 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, FISH positive for BCR-ABL1 translocation  - Enrolled on [facility] [id number]  - Initially enrolled on [facility] [id number] - but 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     - Enrolled on [facility] [id number] and began study 6/13/2022  - Started imatinib therapy 6/3/2022  - End of Induction IA and IB MRD negative  - Imatinib dose increased to 400 mg PO AM and 250 mg PM 9/29/2022  * Note:  All imatinib doses given inpatient rounded down to 600 mg        - Imatinib held for Septic Shock 12/27/2022-1/8/2023        - Imatinib 600 mg PO daily restarted 1/8/2023 inpatient        - Imatinib 400 mg PO QAM and 250 mg PO QHS 1/14/2023-1/17/2023         - Imatinib 600 mg PO QAM 1/17/2023 - present        BRYANNA, AR Arm B, Delayed Intensification, Day 53:   ** Vincristine 2 mg IV x1 given on 2/7/2023 in CIS  ** Continue imatinib 600 mg PO Q AM                     Chemotherapy Related Pancytopenia:  - WBC 0.2, Hgb 8.9 gm/dL, platelets 23,000/microliters  - Transfuse for hemoglobin less than 7 gm/dL or with symptoms  - Transfuse for platelets less than 10,000/microliters or with symptoms  - S/p pRBCs transfusion on 2/8/2023, 2/9/2023  - CBC daily      At risk for deep vein thrombosis due to pegaspargase:  - Platelets still low  - Will hold off from starting apixaban  - Will order SCD, encourage ambulation as tolerated     At Risk of Opportunistic Lung Infection:  - Bactrim DS PO BID Sat and Sun for PJP prophylaxis     Sixth Cranial Nerve Palsy (IMPROVED/RESOLVED):  - Followed by Dr. Carranza      Right Sided Chest Wall Pain: improved   - Exam consistent with musculoskeletal strain  - Much improved, no pain with inhalation      Central Access:   - R Port-A-Cath in place      Research Participant:           [facility] - Source Data         Diagnosis: Ph+ Precursor B-Cell Acute Lymphoblastic Leukemia     Disease Status: EOI1A MRD NEGATIVE, EOI1B RD NEGATIVE, CNS3c, testicular negative, HSV1 IgG POSITIVE, CMV IgG NEGATIVE, VARICELLA IgG POSITIVE     Active Studies: [id number], [id number]                                                                                                      Inactive Studies: [id number]                                                                                                                                                Optional Studies: None             Protocol: International Phase 3 trial in [place] 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      Treatment Plan: BRYANNA(OS), AR-Arm B, Delayed Intensification,  Day 53     Height: 1.650 m      Weight: 64.2kg       BSA: 1.72 m²   (Delayed Intensification Day 29 1/17/2023)                                                                                                                                           Performance Status: Karnofsky 70, ECOG  2 (1/31/2023)     Treatment Plan Medications:  (100% adherent with imatinib)     Imatinib dose adjusted for weight at DI, Day 29 to Imatinib 600 mg PO daily in AM     Evaluations / Study Labs:  (2/10/2023) : None     Therapy Given: (2/10/2023):  Imatinib 600 mg PO QAM    Toxicities:  **Grade 3 febrile neutropenia on 2/7/2023 (ANC <1000/mm3 with a single temperature of >38.3 degrees C (101 degrees F) or a sustained temperature of >=38 degrees C (100.4 degrees F) for more than one hour)  ** Grade 3 diarrhea on 2/6/2023 (Increase of >=7 stools per day  over baseline; hospitalization indicated, limiting self care ADL): Resolved 2/7/2023  ** Grade 2 diarrhea on 2/7/2023 (Increase of 4 - 6 stools per day  over baseline; limiting instrumental ADL) Resolved 2/8/2023  ** Grade 3 vomiting on 2/6/2023 and 2/7/2023 (hospitalization indicated) Resolved 2/8/2023  ** Grade 2 Myositis (Moderate pain associated with weakness; pain limiting, instrumental ADL): Present on admission on 2/7/2023  ** Grade 3 Hypoxemia (Decreased oxygen saturation at rest (e.g., pulse oximeter  <88% or PaO2 <=55 mm Hg), started 2/10/2023        Disposition: Pending resolution of fever, rising ANC, better management of myositis. Plan discussed with bedside nurse and JULY. Mother called and updated with plan.      Alyce Duenas MD  Pediatric Hematology / Oncology  [facility]  Cell.  [phone]  [facility]. [phone]

## 2023-02-10 NOTE — CARE PLAN
The patient is Stable - Low risk of patient condition declining or worsening    Shift Goals  Clinical Goals: VSS, pain control  Patient Goals: Rest, Pain control  Family Goals: NA    Progress made toward(s) clinical / shift goals:    Problem: Respiratory  Goal: Patient will achieve/maintain optimum respiratory ventilation and gas exchange  Outcome: Progressing     Problem: Fluid Volume  Goal: Fluid volume balance will be maintained  Outcome: Progressing     Problem: Pain - Standard  Goal: Alleviation of pain or a reduction in pain to the patient’s comfort goal  Outcome: Progressing       Patient is not progressing towards the following goals:

## 2023-02-10 NOTE — PROGRESS NOTES
Dr. Duenas notified of patient PVCs and need to throw patient on oxygen due to patient maintaining saturations at 85-86% on room air.   No new orders received at this time.

## 2023-02-10 NOTE — PROGRESS NOTES
Pt demonstrates ability to turn self in bed without assistance of staff. Patient understands importance in prevention of skin breakdown, ulcers, and potential infection. Hourly rounding in effect. RN skin check complete.   Devices in place include: chest port, pulse ox, ECG leads x3, BP cuff.  Skin assessed under devices: Yes.  Confirmed HAPI identified on the following date: NA   Location of HAPI: NA.  Wound Care RN following: No.  The following interventions are in place: Patient repositions self as needed, pillows in use for support/repositioning.

## 2023-02-10 NOTE — PROGRESS NOTES
Pt demonstrates ability to turn self in bed without assistance of staff. Patient and family understands importance in prevention of skin breakdown, ulcers, and potential infection. Hourly rounding in effect. RN skin check complete.   Devices in place include: cardiac leads, PICC, .  Skin assessed under devices: Yes.  Confirmed HAPI identified on the following date: N/A   Location of HAPI: N/A.  Wound Care RN following: No.  The following interventions are in place: Skin checked with each assessment.

## 2023-02-11 LAB
ALBUMIN SERPL BCP-MCNC: 2.7 G/DL (ref 3.2–4.9)
ALBUMIN/GLOB SERPL: 1.7 G/DL
ALP SERPL-CCNC: 135 U/L (ref 30–99)
ALT SERPL-CCNC: 18 U/L (ref 2–50)
ANION GAP SERPL CALC-SCNC: 5 MMOL/L (ref 7–16)
ANISOCYTOSIS BLD QL SMEAR: ABNORMAL
AST SERPL-CCNC: 15 U/L (ref 12–45)
BARCODED ABORH UBTYP: 6200
BARCODED PRD CODE UBPRD: NORMAL
BARCODED UNIT NUM UBUNT: NORMAL
BASOPHILS # BLD AUTO: 0 % (ref 0–1.8)
BASOPHILS # BLD: 0 K/UL (ref 0–0.12)
BILIRUB SERPL-MCNC: 0.4 MG/DL (ref 0.1–1.5)
BUN SERPL-MCNC: 6 MG/DL (ref 8–22)
CALCIUM ALBUM COR SERPL-MCNC: 8.8 MG/DL (ref 8.5–10.5)
CALCIUM SERPL-MCNC: 7.8 MG/DL (ref 8.5–10.5)
CHLORIDE SERPL-SCNC: 106 MMOL/L (ref 96–112)
CO2 SERPL-SCNC: 26 MMOL/L (ref 20–33)
COMPONENT P 8504P: NORMAL
CREAT SERPL-MCNC: 0.33 MG/DL (ref 0.5–1.4)
EOSINOPHIL # BLD AUTO: 0 K/UL (ref 0–0.51)
EOSINOPHIL NFR BLD: 0 % (ref 0–6.9)
ERYTHROCYTE [DISTWIDTH] IN BLOOD BY AUTOMATED COUNT: 47.3 FL (ref 35.9–50)
GFR SERPLBLD CREATININE-BSD FMLA CKD-EPI: 168 ML/MIN/1.73 M 2
GLOBULIN SER CALC-MCNC: 1.6 G/DL (ref 1.9–3.5)
GLUCOSE SERPL-MCNC: 118 MG/DL (ref 65–99)
HCT VFR BLD AUTO: 24.6 % (ref 42–52)
HGB BLD-MCNC: 8.2 G/DL (ref 14–18)
HYPOCHROMIA BLD QL SMEAR: ABNORMAL
LYMPHOCYTES # BLD AUTO: 0.03 K/UL (ref 1–4.8)
LYMPHOCYTES NFR BLD: 16.4 % (ref 22–41)
MANUAL DIFF BLD: NORMAL
MCH RBC QN AUTO: 28.3 PG (ref 27–33)
MCHC RBC AUTO-ENTMCNC: 33.3 G/DL (ref 33.7–35.3)
MCV RBC AUTO: 84.8 FL (ref 81.4–97.8)
MICROCYTES BLD QL SMEAR: ABNORMAL
MISCELLANEOUS LAB RESULT MISCLAB: NORMAL
MONOCYTES # BLD AUTO: 0.04 K/UL (ref 0–0.85)
MONOCYTES NFR BLD AUTO: 18.7 % (ref 0–13.4)
MORPHOLOGY BLD-IMP: NORMAL
MYELOCYTES NFR BLD MANUAL: 0.7 %
NEUTROPHILS # BLD AUTO: 0.13 K/UL (ref 1.82–7.42)
NEUTROPHILS NFR BLD: 64.2 % (ref 44–72)
NRBC # BLD AUTO: 0 K/UL
NRBC BLD-RTO: 0 /100 WBC
PLATELET # BLD AUTO: 15 K/UL (ref 164–446)
PLATELET BLD QL SMEAR: NORMAL
PLATELETS.RETICULATED NFR BLD AUTO: 7.3 K/UL (ref 0.6–13.1)
POTASSIUM SERPL-SCNC: 3.4 MMOL/L (ref 3.6–5.5)
PRODUCT TYPE UPROD: NORMAL
PROT SERPL-MCNC: 4.3 G/DL (ref 6–8.2)
RBC # BLD AUTO: 2.9 M/UL (ref 4.7–6.1)
RBC BLD AUTO: PRESENT
SCCMEC + MECA PNL NOSE NAA+PROBE: NEGATIVE
SODIUM SERPL-SCNC: 137 MMOL/L (ref 135–145)
UNIT STATUS USTAT: NORMAL
WBC # BLD AUTO: 0.2 K/UL (ref 4.8–10.8)

## 2023-02-11 PROCEDURE — A9270 NON-COVERED ITEM OR SERVICE: HCPCS | Performed by: PEDIATRICS

## 2023-02-11 PROCEDURE — 700102 HCHG RX REV CODE 250 W/ 637 OVERRIDE(OP): Performed by: PEDIATRICS

## 2023-02-11 PROCEDURE — 700111 HCHG RX REV CODE 636 W/ 250 OVERRIDE (IP): Performed by: PEDIATRICS

## 2023-02-11 PROCEDURE — 85025 COMPLETE CBC W/AUTO DIFF WBC: CPT

## 2023-02-11 PROCEDURE — 770001 HCHG ROOM/CARE - MED/SURG/GYN PRIV*

## 2023-02-11 PROCEDURE — 700105 HCHG RX REV CODE 258: Performed by: PEDIATRICS

## 2023-02-11 PROCEDURE — 85007 BL SMEAR W/DIFF WBC COUNT: CPT

## 2023-02-11 PROCEDURE — P9034 PLATELETS, PHERESIS: HCPCS

## 2023-02-11 PROCEDURE — 99232 SBSQ HOSP IP/OBS MODERATE 35: CPT | Performed by: PEDIATRICS

## 2023-02-11 PROCEDURE — 80053 COMPREHEN METABOLIC PANEL: CPT

## 2023-02-11 PROCEDURE — 30233R1 TRANSFUSION OF NONAUTOLOGOUS PLATELETS INTO PERIPHERAL VEIN, PERCUTANEOUS APPROACH: ICD-10-PCS | Performed by: PEDIATRICS

## 2023-02-11 PROCEDURE — 99232 SBSQ HOSP IP/OBS MODERATE 35: CPT | Performed by: ORTHOPAEDIC SURGERY

## 2023-02-11 PROCEDURE — 85055 RETICULATED PLATELET ASSAY: CPT

## 2023-02-11 PROCEDURE — 36430 TRANSFUSION BLD/BLD COMPNT: CPT

## 2023-02-11 PROCEDURE — 86945 BLOOD PRODUCT/IRRADIATION: CPT

## 2023-02-11 PROCEDURE — 87641 MR-STAPH DNA AMP PROBE: CPT

## 2023-02-11 RX ORDER — LIDOCAINE 50 MG/G
1 PATCH TOPICAL
Status: DISCONTINUED | OUTPATIENT
Start: 2023-02-11 | End: 2023-03-01 | Stop reason: HOSPADM

## 2023-02-11 RX ADMIN — CEFEPIME 2 G: 2 INJECTION, POWDER, FOR SOLUTION INTRAVENOUS at 00:39

## 2023-02-11 RX ADMIN — CHLORHEXIDINE GLUCONATE 0.12% ORAL RINSE 15 ML: 1.2 LIQUID ORAL at 18:32

## 2023-02-11 RX ADMIN — ONDANSETRON 8 MG: 2 INJECTION INTRAMUSCULAR; INTRAVENOUS at 16:17

## 2023-02-11 RX ADMIN — OXYCODONE HYDROCHLORIDE 10 MG: 5 TABLET ORAL at 05:49

## 2023-02-11 RX ADMIN — CEFEPIME 2 G: 2 INJECTION, POWDER, FOR SOLUTION INTRAVENOUS at 16:19

## 2023-02-11 RX ADMIN — IMATINIB MESYLATE 400 MG: 400 TABLET, FILM COATED ORAL at 05:52

## 2023-02-11 RX ADMIN — IMATINIB MESYLATE 200 MG: 100 TABLET, FILM COATED ORAL at 05:53

## 2023-02-11 RX ADMIN — CEFEPIME 2 G: 2 INJECTION, POWDER, FOR SOLUTION INTRAVENOUS at 09:16

## 2023-02-11 RX ADMIN — FAMOTIDINE 20 MG: 20 TABLET ORAL at 16:17

## 2023-02-11 RX ADMIN — ACETAMINOPHEN 650 MG: 325 TABLET, FILM COATED ORAL at 13:43

## 2023-02-11 RX ADMIN — SULFAMETHOXAZOLE AND TRIMETHOPRIM 2 TABLET: 800; 160 TABLET ORAL at 09:15

## 2023-02-11 RX ADMIN — ONDANSETRON 8 MG: 2 INJECTION INTRAMUSCULAR; INTRAVENOUS at 00:07

## 2023-02-11 RX ADMIN — ACETAMINOPHEN 650 MG: 325 TABLET, FILM COATED ORAL at 02:06

## 2023-02-11 RX ADMIN — VANCOMYCIN HYDROCHLORIDE 1250 MG: 500 INJECTION, POWDER, LYOPHILIZED, FOR SOLUTION INTRAVENOUS at 21:53

## 2023-02-11 RX ADMIN — CHLORHEXIDINE GLUCONATE 0.12% ORAL RINSE 15 ML: 1.2 LIQUID ORAL at 13:43

## 2023-02-11 RX ADMIN — ONDANSETRON 8 MG: 2 INJECTION INTRAMUSCULAR; INTRAVENOUS at 09:15

## 2023-02-11 RX ADMIN — CHLORHEXIDINE GLUCONATE 0.12% ORAL RINSE 15 ML: 1.2 LIQUID ORAL at 20:07

## 2023-02-11 RX ADMIN — OXYCODONE HYDROCHLORIDE 10 MG: 5 TABLET ORAL at 18:32

## 2023-02-11 RX ADMIN — FAMOTIDINE 20 MG: 20 TABLET ORAL at 05:51

## 2023-02-11 RX ADMIN — ACETAMINOPHEN 650 MG: 325 TABLET, FILM COATED ORAL at 20:06

## 2023-02-11 RX ADMIN — Medication 1000 UNITS: at 05:51

## 2023-02-11 RX ADMIN — ACETAMINOPHEN 650 MG: 325 TABLET, FILM COATED ORAL at 10:08

## 2023-02-11 RX ADMIN — VANCOMYCIN HYDROCHLORIDE 1250 MG: 500 INJECTION, POWDER, LYOPHILIZED, FOR SOLUTION INTRAVENOUS at 11:51

## 2023-02-11 RX ADMIN — CHLORHEXIDINE GLUCONATE 0.12% ORAL RINSE 15 ML: 1.2 LIQUID ORAL at 09:16

## 2023-02-11 RX ADMIN — OXYCODONE HYDROCHLORIDE 10 MG: 5 TABLET ORAL at 11:47

## 2023-02-11 ASSESSMENT — PAIN DESCRIPTION - PAIN TYPE
TYPE: ACUTE PAIN

## 2023-02-11 NOTE — CARE PLAN
The patient is Stable - Low risk of patient condition declining or worsening    Shift Goals  Clinical Goals: pain control, stable vitals  Patient Goals: comfort at rest  Family Goals: N/A    Progress made toward(s) clinical / shift goals:      Patient is not progressing towards the following goals:      Problem: Knowledge Deficit - Standard  Goal: Patient and family/care givers will demonstrate understanding of plan of care, disease process/condition, diagnostic tests and medications  Outcome: Progressing     Problem: Respiratory  Goal: Patient will achieve/maintain optimum respiratory ventilation and gas exchange  Outcome: Progressing     Problem: Fluid Volume  Goal: Fluid volume balance will be maintained  Outcome: Progressing     Problem: Nutrition - Standard  Goal: Patient's nutritional and fluid intake will be adequate or improve  Outcome: Progressing     Problem: Pain - Standard  Goal: Alleviation of pain or a reduction in pain to the patient’s comfort goal  Outcome: Progressing

## 2023-02-11 NOTE — CONSULTS
Pediatric Orthopedic Consult Note:    Luke Haddad M.D.  Date & Time note created:    2/10/2023   6:37 PM     Referring MD:  Dr. Alyce Duenas    Patient ID:  Name:             Tomas Jean-Baptiste   YOB: 2000  Age:                 22 y.o.  male   MRN:               [id number]                                                     Chief complaint: Left shoulder pain    History of present illness:  Tomas Jean-Baptiste 22 y.o. male who was admitted due to neutropenic fever from treatment of his leukemia. He started having left shoulder pain about 1 weeks ago. He feels that today may be a little worse, requiring pain medication to reduce the pain.    Past medical history:   Past Medical History:   Diagnosis Date    Cancer (HCC)     Leukoma        Past surgical history:   Past Surgical History:   Procedure Laterality Date    CATH PLACEMENT Right 8/29/2022    Procedure: INSERTION, CATHETER;  Surgeon: Simona Moise M.D.;  Location: SURGERY [facility];  Service: Ent       Family history:   Family History   Problem Relation Age of Onset    No Known Problems Mother     Stroke Maternal Grandmother     Diabetes Paternal Grandfather     Hypertension Paternal Grandfather     Hyperlipidemia Paternal Grandfather     Cancer Neg Hx     Heart Disease Neg Hx        Social history:   Social History     Socioeconomic History    Marital status: Single     Spouse name: Not on file    Number of children: Not on file    Years of education: Not on file    Highest education level: Not on file   Occupational History    Not on file   Tobacco Use    Smoking status: Never    Smokeless tobacco: Never   Vaping Use    Vaping Use: Never used   Substance and Sexual Activity    Alcohol use: Never    Drug use: Never    Sexual activity: Not Currently   Other Topics Concern    Behavioral problems Not Asked    Interpersonal relationships Not Asked    Sad or not enjoying activities Not Asked    Suicidal thoughts Not Asked     Poor school performance Not Asked    Reading difficulties Not Asked    Speech difficulties Not Asked    Writing difficulties Not Asked    Inadequate sleep Not Asked    Excessive TV viewing Not Asked    Excessive video game use Not Asked    Inadequate exercise Not Asked    Sports related Not Asked    Poor diet Not Asked    Family concerns for drug/alcohol abuse Not Asked    Poor oral hygiene Not Asked    Bike safety Not Asked    Family concerns vehicle safety Not Asked   Social History Narrative    Not on file     Social Determinants of Health     Financial Resource Strain: Not on file   Food Insecurity: Not on file   Transportation Needs: Not on file   Physical Activity: Not on file   Stress: Not on file   Social Connections: Not on file   Intimate Partner Violence: Not on file   Housing Stability: Not on file       Current medications:   Current Facility-Administered Medications   Medication Dose    LORazepam (ATIVAN) injection 0.5 mg  0.5 mg    acetaminophen (Tylenol) tablet 650 mg  650 mg    oxyCODONE immediate-release (ROXICODONE) tablet 5-10 mg  5-10 mg    lidocaine-prilocaine (EMLA) 2.5-2.5 % cream      D5 1/2 NS infusion      cefepime (Maxipime) 2 g in  mL IVPB  2 g    ondansetron (ZOFRAN) syringe/vial injection 8 mg  8 mg    famotidine (PEPCID) tablet 20 mg  20 mg    chlorhexidine (PERIDEX) 0.12 % solution 15 mL  15 mL    imatinib (Gleevec) tablet 200 mg  200 mg    imatinib (Gleevec) tablet 400 mg  400 mg    therapeutic multivitamin-minerals (THERAGRAN-M) tablet 1 Tablet  1 Tablet    vitamin D3 (cholecalciferol) tablet 1,000 Units  1,000 Units    [START ON 2/11/2023] sulfamethoxazole-trimethoprim (BACTRIM DS) 800-160 MG tablet 2 Tablet  2 Tablet       Allergies:  Allergies   Allergen Reactions    Amoxicillin Rash     Reacted as an infant. No swelling or airway problems.  Tolerated cefepime June 2022       Immunizations:  Immunization History   Administered Date(s) Administered    Dtap Vaccine  2001, 01/29/2002, 06/24/2002, 01/15/2003, 01/23/2006    HIB Vaccine (ACTHIB/HIBERIX) 2001, 01/29/2002, 09/25/2002    HPV Quadrivalent Vaccine (GARDASIL) - HISTORICAL DATA 12/15/2014, 02/17/2015, 06/03/2015    Hepatitis A Vaccine, Ped/Adol 10/03/2003, 01/23/2006    Hepatitis B Vaccine Adolescent/Pediatric 2001, 01/29/2002, 09/25/2002    IPV 2001, 01/29/2002, 06/24/2002, 01/23/2006    Influenza (IM) Preservative Free - HISTORICAL DATA 12/09/2011, 10/17/2013, 09/18/2015, 10/05/2016    Influenza Seasonal Injectable - Historical Data 10/12/2012    Influenza Vac Subunit Quad Inj (Pf) 11/08/2017    Influenza Vaccine Quad Inj (Pf) 11/17/2014, 09/24/2018, 09/27/2019    MMR Vaccine 09/25/2002, 01/15/2003    Meningococcal Conjugate Vaccine MCV4 (MENVEO) 09/24/2018    Meningococcal Conjugate Vaccine MCV4 (Menactra) 12/11/2012    Pneumococcal Vaccine (PCV7) - HISTORICAL DATA 2001, 03/08/2002, 06/24/2002    Tdap Vaccine 10/12/2012    Varicella Vaccine Live 09/25/2002, 01/17/2007       Review of systems:   Constitutional: Fatigued, fevcers,  HENT: Normal breathing, but alopecia  Eyes: Negative for photophobia, discharge and redness.   Respiratory: Negative for cough, wheezing and stridor.    Cardiovascular: Negative for leg swelling.   Gastrointestinal: Negative for constipation, diarrhea, nausea and vomiting.   Genitourinary: No renal disease or abnormalities   Musculoskeletal: Negative for back pain and neck pain, but (+) for left shoulder pain  Skin: Negative for rash.   Neurological: Negative for tremors, sensory change, speech change, focal weakness, seizures, loss of consciousness and weakness.   Endo/Heme/Allergies: Does not bruise/bleed easily.       Physical examination:   Vitals:    02/10/23 1200 02/10/23 1400 02/10/23 1600 02/10/23 1755   BP: 119/60  114/54    Pulse: (!) 122  (!) 150    Resp: 16  (!) 28    Temp: (!) 38.2 °C (100.7 °F) (!) 38.3 °C (101 °F) (!) 40.3 °C (104.5 °F) (!) 39.3 °C  (102.7 °F)   TempSrc: Temporal Temporal Oral Oral   SpO2: 92% (!) 86% (!) 83%    Weight:       Height:         Physical Exam    Ill-appearing, but appropriate - hungry and asking to eat  Tomas Adventist converses freely    Neck is supple with full ROM, TTP (-)  All extremities are normal with the exception of left upper extremity    Left upper extremity:   Left wrist, fingers, elbow, & forearm full ROM, TTP 9_09   Left shoulder painful with AROM, TTP (+) anteriorly, full, pain-free PROM   Neuro intract (+)(   Skin warm to touch, but febrile & equal to other limbs    ANCILLARY DATA REVIEWED:     Lab Data Review:  Recent Labs     02/08/23  0300 02/09/23  0325 02/10/23  0745   WBC 0.1* 0.1* 0.2*   RBC 2.32* 2.41* 3.10*   HEMOGLOBIN 6.6* 6.9* 8.9*   HEMATOCRIT 19.6* 20.2* 26.4*   MCV 84.5 83.8 85.2   MCH 28.4 28.6 28.7   MCHC 33.7 34.2 33.7   RDW 45.9 45.2 46.1   PLATELETCT 20* 21* 23*   MPV 11.0 12.1 10.5     Recent Labs     02/10/23  0745   SODIUM 137   POTASSIUM 3.8   CHLORIDE 104   CO2 27   GLUCOSE 93   BUN 3*   CREATININE 0.38*   CALCIUM 8.0*           MICRO:  No results found for: BLOODCULTU, BLDCULT, BCHOLD     Imaging  XR's left shoulder - negative for fracture or acute injury  MRI left shoulder - inflammation in muscles / myositis without focal collection or abscess; mild amount of joint fluid with fluid surrounding the biceps tendon; osseous changes, likely AVN vs. Myelodysplasia-related, no evidence of osteomyelitis or intra-osseous abscess    ASSESSMENT AND PLAN:  Left shoulder myositis, possible biceps tendon involvment  Currently on Cefipime, recommend addition of broad-spectrum antibiotic given neutropenia  Will make NPO after midnight to check clinical course in a.m. to see if surgical intervention is needed  Recommend ice pack to shoulder and encourage ROM of LUE  Will follow        Luke Haddad M.D.  Pediatric Orthopedics and Scoliosis  [facility]

## 2023-02-11 NOTE — CARE PLAN
Problem: Knowledge Deficit - Standard  Goal: Patient and family/care givers will demonstrate understanding of plan of care, disease process/condition, diagnostic tests and medications  Outcome: Progressing     Problem: Psychosocial  Goal: Patient will experience minimized separation anxiety and fear  Outcome: Progressing  Goal: Spiritual and cultural needs will be incorporated into hospitalization  Outcome: Progressing     Problem: Security Measures  Goal: Patient and family will demonstrate understanding of security measures  Outcome: Progressing     Problem: Discharge Barriers/Planning  Goal: Patient's continuum of care needs are met  Outcome: Progressing   The patient is Watcher - Medium risk of patient condition declining or worsening    Shift Goals  Clinical Goals: pain control, stable vitals  Patient Goals: comfort at rest  Family Goals: N/A

## 2023-02-11 NOTE — PROGRESS NOTES
Pharmacy Vancomycin Kinetics Note for 2/10/2023     22 y.o. male on Vancomycin day # 1     Vancomycin Indication (AUC Dosing): Skin/skin structure infection    Provider specified end date: 02/16/23    Active Antibiotics (From admission, onward)      Ordered     Ordering Provider       Fri Feb 10, 2023  7:46 PM    02/10/23 1946  vancomycin (VANCOCIN) 1,250 mg in  mL IVPB  (vancomycin (VANCOCIN) IV (LD + Maintenance))  EVERY 12 HOURS        Note to Pharmacy: Per P&T vanco per pharmacy Protocol    Alyce Duenas M.D.       Fri Feb 10, 2023  7:36 PM    02/10/23 1936  vancomycin (VANCOCIN) 1,500 mg in  mL IVPB  (vancomycin (VANCOCIN) IV (LD + Maintenance))  ONCE        Note to Pharmacy: Per P&T vanco per pharmacy Protocol    Alyce Duenas M.D.       Fri Feb 10, 2023  6:38 PM    02/10/23 1838  MD Alert...Vancomycin per Pharmacy  PHARMACY TO DOSE        Question:  Indication(s) for vancomycin?  Answer:  Skin and soft tissue infection    Alyce Duenas M.D.       brooks Feb 7, 2023  9:26 PM    02/07/23 2126  sulfamethoxazole-trimethoprim (BACTRIM DS) 800-160 MG tablet 2 Tablet  EVERY SAT , SUN         Alyce Duenas M.D.       brooks Feb 7, 2023 12:35 PM    02/07/23 1235  cefepime (Maxipime) 2 g in  mL IVPB  EVERY 8 HOURS         Alyce Duenas M.D.            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 Pt is also neturopenic with an ANC of 70 per microliter. Pt has been receiving cefepime and Vanco therapy is being added.    Allergies:     Amoxicillin     Pertinent cultures to date:     Results       Procedure Component Value Units Date/Time    Blood Culture [[id number]] Collected: 02/09/23 1254    Order Status: Completed Specimen: Blood from Line Updated: 02/10/23 0754     Significant Indicator NEG  "    Source BLD     Site LINE     Culture Result No Growth  Note: Blood cultures are incubated for 5 days and  are monitored continuously.Positive blood cultures  are called to the RN and reported as soon as  they are identified.      Narrative:      Protective  Per Hospital Policy: Only change Specimen Src: to \"Line\" if  specified by physician order.  Chest Port    Blood Culture [[id number]]     Order Status: Canceled Specimen: Blood from Line             Labs:     Estimated Creatinine Clearance: 267.5 mL/min (A) (by C-G formula based on SCr of 0.38 mg/dL (L)).  Recent Labs     23  0300 23  0325 02/10/23  0745   WBC 0.1* 0.1* 0.2*   NEUTSPOLYS  --  7.70* 36.80*     Recent Labs     02/10/23  0745   BUN 3*   CREATININE 0.38*   ALBUMIN 2.8*       Intake/Output Summary (Last 24 hours) at 2/10/2023 194  Last data filed at 2/10/2023 1200  Gross per 24 hour   Intake 2418.66 ml   Output 1750 ml   Net 668.66 ml      /54   Pulse (!) 150   Temp (!) 39.3 °C (102.7 °F) (Oral)   Resp (!) 28   Ht 1.651 m (5' 5\")   Wt 62 kg (136 lb 11 oz)   SpO2 (!) 83%  Temp (24hrs), Av.5 °C (101.3 °F), Min:37.1 °C (98.8 °F), Max:40.3 °C (104.5 °F)      List concerns for Vancomycin clearance:     None    Pharmacokinetics:     AUC kinetics:   Ke (hr ^-1): 0.1248 hr^-1  Half life: 5.55 hr  Clearance: 5.029  Estimated TDD: 2514.5  Estimated Dose: 796  Estimated interval: 7.6    A/P:     -  Vancomycin dose: 1250mg (20mg/kg) Q12h    -  Next vancomycin level(s):    - None ordered at this time. Likely obtain levels after the 4th maintenance dose unless clinical status or renal function changes.     -  Predicted vancomycin AUC from initial AUC test calculator: 497 mg·hr/L    -  Comments: No concerns for accumulation. Pharmacy will continue to follow and adjust therapy as clinically appropriate.    Иван Fan, PharmD    "

## 2023-02-11 NOTE — PROGRESS NOTES
Pediatric Orthopedic Consult Note:    Luke Haddad M.D.  Date & Time note created:    2/11/2023   9:24 AM     Referring MD:  Dr. Alyce Duenas    Patient ID:  Name:             Tomas Jean-Baptiste   YOB: 2000  Age:                 22 y.o.  male   MRN:               [id number]                                                     Chief complaint: Left shoulder pain    History of present illness:  Tomas Jean-Baptiste 22 y.o. male who was admitted due to neutropenic fever from treatment of his leukemia. He started having left shoulder pain about 1 weeks ago. He feels that today may be a little worse, requiring pain medication to reduce the pain.    Interval History (2/11/2023):  JULY was examined this morning. His pain profile is relatively unchanged from yesterday. The pain medication helps quite a bit. Still has pain in anterior left shoulder.    Past medical history:   Past Medical History:   Diagnosis Date    Cancer (HCC)     Leukoma        Past surgical history:   Past Surgical History:   Procedure Laterality Date    CATH PLACEMENT Right 8/29/2022    Procedure: INSERTION, CATHETER;  Surgeon: Simona Moise M.D.;  Location: SURGERY [facility];  Service: Ent       Family history:   Family History   Problem Relation Age of Onset    No Known Problems Mother     Stroke Maternal Grandmother     Diabetes Paternal Grandfather     Hypertension Paternal Grandfather     Hyperlipidemia Paternal Grandfather     Cancer Neg Hx     Heart Disease Neg Hx        Social history:   Social History     Socioeconomic History    Marital status: Single     Spouse name: Not on file    Number of children: Not on file    Years of education: Not on file    Highest education level: Not on file   Occupational History    Not on file   Tobacco Use    Smoking status: Never    Smokeless tobacco: Never   Vaping Use    Vaping Use: Never used   Substance and Sexual Activity    Alcohol use: Never    Drug use: Never     Sexual activity: Not Currently   Other Topics Concern    Behavioral problems Not Asked    Interpersonal relationships Not Asked    Sad or not enjoying activities Not Asked    Suicidal thoughts Not Asked    Poor school performance Not Asked    Reading difficulties Not Asked    Speech difficulties Not Asked    Writing difficulties Not Asked    Inadequate sleep Not Asked    Excessive TV viewing Not Asked    Excessive video game use Not Asked    Inadequate exercise Not Asked    Sports related Not Asked    Poor diet Not Asked    Family concerns for drug/alcohol abuse Not Asked    Poor oral hygiene Not Asked    Bike safety Not Asked    Family concerns vehicle safety Not Asked   Social History Narrative    Not on file     Social Determinants of Health     Financial Resource Strain: Not on file   Food Insecurity: Not on file   Transportation Needs: Not on file   Physical Activity: Not on file   Stress: Not on file   Social Connections: Not on file   Intimate Partner Violence: Not on file   Housing Stability: Not on file       Current medications:   Current Facility-Administered Medications   Medication Dose    MD Alert...Vancomycin per Pharmacy      vancomycin (VANCOCIN) 1,250 mg in  mL IVPB  1,250 mg    LORazepam (ATIVAN) injection 0.5 mg  0.5 mg    acetaminophen (Tylenol) tablet 650 mg  650 mg    oxyCODONE immediate-release (ROXICODONE) tablet 5-10 mg  5-10 mg    lidocaine-prilocaine (EMLA) 2.5-2.5 % cream      D5 1/2 NS infusion      cefepime (Maxipime) 2 g in  mL IVPB  2 g    ondansetron (ZOFRAN) syringe/vial injection 8 mg  8 mg    famotidine (PEPCID) tablet 20 mg  20 mg    chlorhexidine (PERIDEX) 0.12 % solution 15 mL  15 mL    imatinib (Gleevec) tablet 200 mg  200 mg    imatinib (Gleevec) tablet 400 mg  400 mg    therapeutic multivitamin-minerals (THERAGRAN-M) tablet 1 Tablet  1 Tablet    vitamin D3 (cholecalciferol) tablet 1,000 Units  1,000 Units    sulfamethoxazole-trimethoprim (BACTRIM DS) 800-160 MG  tablet 2 Tablet  2 Tablet       Allergies:  Allergies   Allergen Reactions    Amoxicillin Rash     Reacted as an infant. No swelling or airway problems.  Tolerated cefepime June 2022       Immunizations:  Immunization History   Administered Date(s) Administered    Dtap Vaccine 2001, 01/29/2002, 06/24/2002, 01/15/2003, 01/23/2006    HIB Vaccine (ACTHIB/HIBERIX) 2001, 01/29/2002, 09/25/2002    HPV Quadrivalent Vaccine (GARDASIL) - HISTORICAL DATA 12/15/2014, 02/17/2015, 06/03/2015    Hepatitis A Vaccine, Ped/Adol 10/03/2003, 01/23/2006    Hepatitis B Vaccine Adolescent/Pediatric 2001, 01/29/2002, 09/25/2002    IPV 2001, 01/29/2002, 06/24/2002, 01/23/2006    Influenza (IM) Preservative Free - HISTORICAL DATA 12/09/2011, 10/17/2013, 09/18/2015, 10/05/2016    Influenza Seasonal Injectable - Historical Data 10/12/2012    Influenza Vac Subunit Quad Inj (Pf) 11/08/2017    Influenza Vaccine Quad Inj (Pf) 11/17/2014, 09/24/2018, 09/27/2019    MMR Vaccine 09/25/2002, 01/15/2003    Meningococcal Conjugate Vaccine MCV4 (MENVEO) 09/24/2018    Meningococcal Conjugate Vaccine MCV4 (Menactra) 12/11/2012    Pneumococcal Vaccine (PCV7) - HISTORICAL DATA 2001, 03/08/2002, 06/24/2002    Tdap Vaccine 10/12/2012    Varicella Vaccine Live 09/25/2002, 01/17/2007       Review of systems:   Constitutional: Fatigued, fevcers,  HENT: Normal breathing, but alopecia  Eyes: Negative for photophobia, discharge and redness.   Respiratory: Negative for cough, wheezing and stridor.    Cardiovascular: Negative for leg swelling.   Gastrointestinal: Negative for constipation, diarrhea, nausea and vomiting.   Genitourinary: No renal disease or abnormalities   Musculoskeletal: Negative for back pain and neck pain, but (+) for left shoulder pain  Skin: Negative for rash.   Neurological: Negative for tremors, sensory change, speech change, focal weakness, seizures, loss of consciousness and weakness.   Endo/Heme/Allergies:  platelets 15, neutropenia (+)      Physical examination:   Vitals:    02/11/23 0000 02/11/23 0400 02/11/23 0606 02/11/23 0610   BP: 110/78 108/62 116/68    Pulse: 92 86 (!) 115 (!) 109   Resp: 20 18 18 20   Temp: 37 °C (98.6 °F) 36.8 °C (98.2 °F) 36.1 °C (97 °F)    TempSrc: Temporal Temporal Temporal    SpO2: 98% 96% 96% 95%   Weight:       Height:         Physical Exam    Weak-appearing, but appropriate & conversive    Left upper extremity:   Left wrist, fingers, elbow, & forearm full ROM, TTP (-)   Left shoulder painful with AROM, TTP (+) anteriorly, full, PROM with mild pain   Neuro intract (+)   Skin warm to touch, but febrile & equal to other limbs    ANCILLARY DATA REVIEWED:     Lab Data Review:  Recent Labs     02/09/23  0325 02/10/23  0745 02/11/23  0604   WBC 0.1* 0.2* 0.2*   RBC 2.41* 3.10* 2.90*   HEMOGLOBIN 6.9* 8.9* 8.2*   HEMATOCRIT 20.2* 26.4* 24.6*   MCV 83.8 85.2 84.8   MCH 28.6 28.7 28.3   MCHC 34.2 33.7 33.3*   RDW 45.2 46.1 47.3   PLATELETCT 21* 23* 15*   MPV 12.1 10.5  --        Recent Labs     02/10/23  0745 02/11/23  0604   SODIUM 137 137   POTASSIUM 3.8 3.4*   CHLORIDE 104 106   CO2 27 26   GLUCOSE 93 118*   BUN 3* 6*   CREATININE 0.38* 0.33*   CALCIUM 8.0* 7.8*             MICRO:  Blood cultures from 2/9/2023 - no growth     Imaging  XR's left shoulder - negative for fracture or acute injury  MRI left shoulder - inflammation in muscles / myositis without focal collection or abscess; mild amount of joint fluid with fluid surrounding the biceps tendon; osseous changes, likely AVN vs. Myelodysplasia-related, no evidence of osteomyelitis or intra-osseous abscess    ASSESSMENT AND PLAN:  Left shoulder myositis, possible biceps tendon involvment  Currently antibiotics  Will allow to eat today as not needed to go to surgery. Will make NPO again after midnight to check clinical course in a.m. to see if surgical intervention is needed, as today  Recommend ice pack to shoulder and encourage ROM of  JOSE CRUZ  Will follow    Luke Haddad M.D.  Pediatric Orthopedics and Scoliosis  [facility]

## 2023-02-11 NOTE — PROGRESS NOTES
ADELA from Lab called with critical result of WBC 0.2 Plt 15 at 0641. Critical lab result read back to ADELA.   Dr. Duenas notified of critical lab result at 0642.  Critical lab result read back by Dr. Duenas.

## 2023-02-12 LAB
ANION GAP SERPL CALC-SCNC: 8 MMOL/L (ref 7–16)
BACTERIA BLD CULT: NORMAL
BASOPHILS # BLD AUTO: 0 % (ref 0–1.8)
BASOPHILS # BLD: 0 K/UL (ref 0–0.12)
BUN SERPL-MCNC: 6 MG/DL (ref 8–22)
CALCIUM SERPL-MCNC: 7.5 MG/DL (ref 8.5–10.5)
CHLORIDE SERPL-SCNC: 102 MMOL/L (ref 96–112)
CK SERPL-CCNC: 17 U/L (ref 0–154)
CO2 SERPL-SCNC: 25 MMOL/L (ref 20–33)
CREAT SERPL-MCNC: 0.41 MG/DL (ref 0.5–1.4)
DIAGNOSTIC IMP SPEC-IMP: NOT DETECTED
EBV DNA # SPEC NAA+PROBE: <390 CPY/ML
EBV DNA SPEC NAA+PROBE-LOG#: <2.6 LOG
EOSINOPHIL # BLD AUTO: 0 K/UL (ref 0–0.51)
EOSINOPHIL NFR BLD: 0 % (ref 0–6.9)
ERYTHROCYTE [DISTWIDTH] IN BLOOD BY AUTOMATED COUNT: 49.2 FL (ref 35.9–50)
GFR SERPLBLD CREATININE-BSD FMLA CKD-EPI: 158 ML/MIN/1.73 M 2
GLUCOSE SERPL-MCNC: 126 MG/DL (ref 65–99)
HCT VFR BLD AUTO: 23.6 % (ref 42–52)
HGB BLD-MCNC: 7.9 G/DL (ref 14–18)
LYMPHOCYTES # BLD AUTO: 0.04 K/UL (ref 1–4.8)
LYMPHOCYTES NFR BLD: 11.1 % (ref 22–41)
MANUAL DIFF BLD: NORMAL
MCH RBC QN AUTO: 28.6 PG (ref 27–33)
MCHC RBC AUTO-ENTMCNC: 33.5 G/DL (ref 33.7–35.3)
MCV RBC AUTO: 85.5 FL (ref 81.4–97.8)
MONOCYTES # BLD AUTO: 0.04 K/UL (ref 0–0.85)
MONOCYTES NFR BLD AUTO: 9.5 % (ref 0–13.4)
NEUTROPHILS # BLD AUTO: 0.32 K/UL (ref 1.82–7.42)
NEUTROPHILS NFR BLD: 79.4 % (ref 44–72)
NRBC # BLD AUTO: ABNORMAL K/UL
NRBC BLD-RTO: ABNORMAL /100 WBC
PLATELET # BLD AUTO: 42 K/UL (ref 164–446)
PMV BLD AUTO: 11.5 FL (ref 9–12.9)
POTASSIUM SERPL-SCNC: 3.5 MMOL/L (ref 3.6–5.5)
RBC # BLD AUTO: 2.76 M/UL (ref 4.7–6.1)
SIGNIFICANT IND 70042: NORMAL
SITE SITE: NORMAL
SODIUM SERPL-SCNC: 135 MMOL/L (ref 135–145)
SOURCE SOURCE: NORMAL
SPECIMEN SOURCE: NORMAL
WBC # BLD AUTO: 0.4 K/UL (ref 4.8–10.8)

## 2023-02-12 PROCEDURE — 85007 BL SMEAR W/DIFF WBC COUNT: CPT

## 2023-02-12 PROCEDURE — 770001 HCHG ROOM/CARE - MED/SURG/GYN PRIV*

## 2023-02-12 PROCEDURE — 80048 BASIC METABOLIC PNL TOTAL CA: CPT

## 2023-02-12 PROCEDURE — 85025 COMPLETE CBC W/AUTO DIFF WBC: CPT

## 2023-02-12 PROCEDURE — 700105 HCHG RX REV CODE 258: Performed by: PEDIATRICS

## 2023-02-12 PROCEDURE — 99232 SBSQ HOSP IP/OBS MODERATE 35: CPT | Performed by: PEDIATRICS

## 2023-02-12 PROCEDURE — 82550 ASSAY OF CK (CPK): CPT

## 2023-02-12 PROCEDURE — 99232 SBSQ HOSP IP/OBS MODERATE 35: CPT | Performed by: ORTHOPAEDIC SURGERY

## 2023-02-12 PROCEDURE — 700102 HCHG RX REV CODE 250 W/ 637 OVERRIDE(OP): Performed by: PEDIATRICS

## 2023-02-12 PROCEDURE — 700111 HCHG RX REV CODE 636 W/ 250 OVERRIDE (IP): Performed by: PEDIATRICS

## 2023-02-12 PROCEDURE — A9270 NON-COVERED ITEM OR SERVICE: HCPCS | Performed by: PEDIATRICS

## 2023-02-12 RX ORDER — AMOXICILLIN 250 MG
1 CAPSULE ORAL DAILY
Status: DISCONTINUED | OUTPATIENT
Start: 2023-02-12 | End: 2023-03-01 | Stop reason: HOSPADM

## 2023-02-12 RX ADMIN — OXYCODONE HYDROCHLORIDE 10 MG: 5 TABLET ORAL at 00:03

## 2023-02-12 RX ADMIN — ONDANSETRON 8 MG: 2 INJECTION INTRAMUSCULAR; INTRAVENOUS at 00:04

## 2023-02-12 RX ADMIN — CEFEPIME 2 G: 2 INJECTION, POWDER, FOR SOLUTION INTRAVENOUS at 00:04

## 2023-02-12 RX ADMIN — ONDANSETRON 8 MG: 2 INJECTION INTRAMUSCULAR; INTRAVENOUS at 16:30

## 2023-02-12 RX ADMIN — SULFAMETHOXAZOLE AND TRIMETHOPRIM 2 TABLET: 800; 160 TABLET ORAL at 09:09

## 2023-02-12 RX ADMIN — VANCOMYCIN HYDROCHLORIDE 1250 MG: 500 INJECTION, POWDER, LYOPHILIZED, FOR SOLUTION INTRAVENOUS at 22:19

## 2023-02-12 RX ADMIN — CEFEPIME 2 G: 2 INJECTION, POWDER, FOR SOLUTION INTRAVENOUS at 16:37

## 2023-02-12 RX ADMIN — CHLORHEXIDINE GLUCONATE 0.12% ORAL RINSE 15 ML: 1.2 LIQUID ORAL at 09:09

## 2023-02-12 RX ADMIN — CHLORHEXIDINE GLUCONATE 0.12% ORAL RINSE 15 ML: 1.2 LIQUID ORAL at 15:10

## 2023-02-12 RX ADMIN — ACETAMINOPHEN 650 MG: 325 TABLET, FILM COATED ORAL at 15:10

## 2023-02-12 RX ADMIN — ONDANSETRON 8 MG: 2 INJECTION INTRAMUSCULAR; INTRAVENOUS at 08:06

## 2023-02-12 RX ADMIN — FAMOTIDINE 20 MG: 20 TABLET ORAL at 16:48

## 2023-02-12 RX ADMIN — ACETAMINOPHEN 650 MG: 325 TABLET, FILM COATED ORAL at 02:26

## 2023-02-12 RX ADMIN — OXYCODONE HYDROCHLORIDE 10 MG: 5 TABLET ORAL at 06:38

## 2023-02-12 RX ADMIN — IMATINIB MESYLATE 400 MG: 400 TABLET, FILM COATED ORAL at 06:32

## 2023-02-12 RX ADMIN — SENNOSIDES AND DOCUSATE SODIUM 1 TABLET: 50; 8.6 TABLET ORAL at 11:41

## 2023-02-12 RX ADMIN — DEXTROSE AND SODIUM CHLORIDE: 5; 450 INJECTION, SOLUTION INTRAVENOUS at 04:10

## 2023-02-12 RX ADMIN — CHLORHEXIDINE GLUCONATE 0.12% ORAL RINSE 15 ML: 1.2 LIQUID ORAL at 22:16

## 2023-02-12 RX ADMIN — ACETAMINOPHEN 650 MG: 325 TABLET, FILM COATED ORAL at 08:05

## 2023-02-12 RX ADMIN — Medication 1000 UNITS: at 06:31

## 2023-02-12 RX ADMIN — ACETAMINOPHEN 650 MG: 325 TABLET, FILM COATED ORAL at 19:32

## 2023-02-12 RX ADMIN — VANCOMYCIN HYDROCHLORIDE 1250 MG: 500 INJECTION, POWDER, LYOPHILIZED, FOR SOLUTION INTRAVENOUS at 11:40

## 2023-02-12 RX ADMIN — Medication 1 TABLET: at 06:31

## 2023-02-12 RX ADMIN — CEFEPIME 2 G: 2 INJECTION, POWDER, FOR SOLUTION INTRAVENOUS at 08:13

## 2023-02-12 RX ADMIN — FAMOTIDINE 20 MG: 20 TABLET ORAL at 03:16

## 2023-02-12 RX ADMIN — IMATINIB MESYLATE 200 MG: 100 TABLET, FILM COATED ORAL at 06:31

## 2023-02-12 RX ADMIN — OXYCODONE HYDROCHLORIDE 5 MG: 5 TABLET ORAL at 16:48

## 2023-02-12 RX ADMIN — CHLORHEXIDINE GLUCONATE 0.12% ORAL RINSE 15 ML: 1.2 LIQUID ORAL at 16:38

## 2023-02-12 ASSESSMENT — PAIN DESCRIPTION - PAIN TYPE
TYPE: ACUTE PAIN

## 2023-02-12 NOTE — PROGRESS NOTES
"Pediatric Hematology/Oncology  Daily Progress Note      Patient Name:  Tomas Jean-Baptiste  : 2000  MRN: [id number]    Location of Service:  [facility]  Date of Service: 2023  Time: 4:19 PM    Hospital Day: 5    Protocol / Treatment Plan: [place]ke Chromosome Precursor B-Cell Acute Lymphoblastic Leukemia, ON STUDY [id number], Delayed Intensification, Day 54    SUBJECTIVE:     Afebrile overnight with Tmax of 99.5 F. Tachycardia improving. Hypoxic to 85-86% overnight requiring 0.5 L oxygen.  Still with L shoulder pain which improves after taking oxycodone. Nausea well controlled and appetite improving. No emesis.  Now having formed stool. No abdominal pain/cramps. No neurologic changes. No easy bruising or rash.     Review of Systems:     Constitutional: Afebrile, tired. Improving appetite.  HENT: Negative for auditory changes or ear pain, no nosebleeds, no congestion and rhinorrhea, no sore throat.  No mouth sores.  Eyes: Negative for visual changes.  Respiratory: Negative for shortness of breath or noisy breathing.   Cardiovascular: Negative for chest pain and leg swelling.    Gastrointestinal: Negative for abdominal pain, constipation and blood in stool.  Previously reported nausea/vomiting and diarrhea under control.  Genitourinary: Negative for dysuria.  Musculoskeletal: Positive for L shoulder pain.  Skin: Negative for rash or skin infection.  Neurological: Negative for numbness, tingling, sensory changes, weakness or headaches.    Endo/Heme/Allergies: No bruising/bleeding easily.    Psychiatric/Behavioral: Somewhat flat affect/ tired.    OBJECTIVE:     Max Temp: Temp (24hrs), Av.6 °C (99.6 °F), Min:36.7 °C (98 °F), Max:39.2 °C (102.6 °F)      Vitals: /52   Pulse (!) 115   Temp 36.9 °C (98.4 °F) (Temporal)   Resp 18   Ht 1.651 m (5' 5\")   Wt 62 kg (136 lb 11 oz)   SpO2 96%   BMI 22.75 kg/m²     I/O:   Intake/Output Summary (Last 24 hours) at " 2/11/2023 1619  Last data filed at 2/11/2023 1445  Gross per 24 hour   Intake 1434.5 ml   Output 1850 ml   Net -415.5 ml         Labs:    Most Recent Hematology Labs:    Recent Labs     02/09/23  0325 02/10/23  0745 02/11/23  0604   WBC 0.1* 0.2* 0.2*   RBC 2.41* 3.10* 2.90*   HEMOGLOBIN 6.9* 8.9* 8.2*   HEMATOCRIT 20.2* 26.4* 24.6*   MCV 83.8 85.2 84.8   MCH 28.6 28.7 28.3   RDW 45.2 46.1 47.3   PLATELETCT 21* 23* 15*   MPV 12.1 10.5  --    NEUTSPOLYS 7.70* 36.80* 64.20   LYMPHOCYTES 76.90* 57.90* 16.40*   MONOCYTES 15.40* 5.30 18.70*   EOSINOPHILS 0.00 0.00 0.00   BASOPHILS 0.00 0.00 0.00   RBCMORPHOLO Present Normal Present           Most Recent Metabolic Panel:     Latest Reference Range & Units 02/11/23 06:04   Sodium 135 - 145 mmol/L 137   Potassium 3.6 - 5.5 mmol/L 3.4 (L)   Chloride 96 - 112 mmol/L 106   Co2 20 - 33 mmol/L 26   Anion Gap 7.0 - 16.0  5.0 (L)   Glucose 65 - 99 mg/dL 118 (H)   Bun 8 - 22 mg/dL 6 (L)   Creatinine 0.50 - 1.40 mg/dL 0.33 (L)   GFR (CKD-EPI) >60 mL/min/1.73 m 2 168   Calcium 8.5 - 10.5 mg/dL 7.8 (L)   Correct Calcium 8.5 - 10.5 mg/dL 8.8   AST(SGOT) 12 - 45 U/L 15   ALT(SGPT) 2 - 50 U/L 18   Alkaline Phosphatase 30 - 99 U/L 135 (H)   Total Bilirubin 0.1 - 1.5 mg/dL 0.4   Albumin 3.2 - 4.9 g/dL 2.7 (L)   Total Protein 6.0 - 8.2 g/dL 4.3 (L)   Globulin 1.9 - 3.5 g/dL 1.6 (L)   A-G Ratio g/dL 1.7       Blood culture 2/7/2022 (port): NGTD  Blood culture from 2/9/2023 (port): NGTD  CMV PCR 2/9/2023: Pending  EBV PCR 2/9/2023: Negative  HSV PCR 2/9/2023: Pending    X-ray L shoulder: 2/8/2023  FINDINGS:  Clavicle is intact.  AC joint is preserved.  Visualized proximal humerus is intact and normally located.  Visualized LEFT chest is unremarkable.     IMPRESSION:     No fracture or dislocation of LEFT shoulder.    MRI L shoulder: 2/10/2023:  1.  Diffuse abnormal marrow signal involving the visualized portions of the humerus, scapula and clavicle characterized by mottled serpiginous  heterogeneous areas of increased T2 and decreased T1 signal. This most likely is related to the patient's   history of leukemia and represents a diffuse marrow disorder.     2.  Some periosteal enhancement surrounding the proximal humeral diaphysis and metaphysis with some slight cortical irregularity posteriorly and medially. This may be related to neoplastic process however infectious process could potentially have this   appearance as well.     3.  Diffuse edema and enhancement of the deltoid, latissimus dorsi, short head of the biceps, coracobrachialis, supraspinatus, infraspinatus and subscapularis muscles with a small amount of intermuscular edema/fluid suggesting myositis.     4.  No focal abscess.     5.  Small glenohumeral joint effusion.     6.  No evidence of fracture.    Physical Exam:    Constitutional: Sitting up in bed. C/o L shoulder pain, Tired.  HENT: Normocephalic and atraumatic. Alopecia.  No rhinorrhea. Oropharynx is clear and moist.   Eyes: Conjunctivae are normal. EOMI.   Neck: Normal range of motion of neck, no adenopathy.    Cardiovascular: Tachycardic, regular rhythm. DP/radial pulses 2+, cap refill < 2 sec  Pulmonary/Chest: Effort normall. No respiratory distress. Symmetric expansion.  No crackles or wheezes. PAC to chest wall. Dressing C/D/I.  Abdomen: Soft. Bowel sounds are normal. No distension and no mass. There is no hepatosplenomegaly.    Genitourinary:  Deferred  Musculoskeletal: Restriction in movement of L shoulder (both passive and active). No point tenderness along clavicle, humeral head. No bruising or lump noted along the arm/forearm muscles. Edema bilateral lower extremities.  Neurological: Alert and oriented to person and place. Exhibits normal muscle tone bilaterally in upper and lower extremities. Gait normal. Coordination normal.    Skin: Skin is warm, dry and pink.  No rash or evidence of skin infection.    Psychiatric: Mood improved greatly from yesterday.    ASSESSMENT  AND PLAN:     Tomas Jean-Baptiste is a 22 y.o. male who presented to the [facility] for Day 50 chemotherapy with Vincristine on 2/7/2023. During that appointment he was noted to febrile with chills and dehydrated. Hence, decision made to admit him to pediatric ott for management of febrile neutropenia and dehydration.    Overall feeling tired. Evaluated by orthopedic surgeon yesterday for L shoulder pain. Due to myositis and concern for intermuscular fluid, started vancomycin in addition to cefepime. Patient afebrile overnight. On Scheduled tylenol. Repeat blood culture obtained on 2/9/2023 shows NGTD. Patient made NPO overnight for possible procedure (drainage of fluid) in am if patient's clinical condition worsens. Received platelet transfusion in anticipation of the procedure. However, repeat exam today stable. Hence, procedure deferred. Receiving oxycodone for pain. Patient's nausea is under better control and he is able to eat today. No emesis. Now having formed stool. Hypoxia when deep sleep, likely also due to atelectasis. Counts trending up.     Febrile Neutropenia in an immunocompromised host: Etiology not clear: Counts trending up which is encouraging  - Previous history of gram negative septic shock  - Patient having fever with chills  - Blood culture from port 2/7/2023: NGTD  - Repeat blood culture from port 2/9/2023: NGTD  - 1 L NS bolus x 1 in CIS  - Initially on D5 0.45 % NS at 100 ml/hr, decreased to 50 ml/hr  - IV Cefepime x 1 in CIS, continue every 8 hrs  - IV Vancomycin started on 2/10/2023, 1250 mg every 12 hrs  - Tylenol PO x 1 for fever in CIS  - Scheduled tylenol every 6 hrs since 2/9/2023     Left shoulder pain: Myositis/intermuscular edema/fluid  - Pain medication and sling helping  - Norco discontinued, Switched to oxycodone on 2/9/2023  - Patient denying PCA or lidoderm patch  - Encourage rest, warm packs  - X-ray L shoulder  today 2/8/2023: not concerning  - MRI shoulder 2/10/2023: showing diffuse myositis and some intermuscular edema/fluid  - Orthopedic surgeon consulted: Appreciate recs  - Added IV Vancomycin     Diarrhea: Likely from cytarabine vs enteritis: Resolved, formed stool  - C.diff toxin assay : negative PCR ([id number] negative)   - Stool culture: NGTD  - Will continue to monitor     Nausea/Vomiting secondary to chemotherapy: resolved  - Zofran IV x 1 in CIS prior to VCR  - Continue Zofran scheduled every 8 hrs  - Continue to monitor     Retrosternal chest pain : likely from LLOYD, improved  - Continue famotidine 20 mg BID  - Continue to monitor    Hypoxemia: likely from opioids/atelectasis  - 85-86% with deep sleep  - Requiring 0.5 L oxygen at night/ even in am  - Will  restart IVF at 50 ml/hr given patient on vancomycin, encourage ambulation as tolerated  - If happens again, will get CXR to r/o pleural effusion/edema, consider lasix IV x 1  - Will order incentive spirometry if no improvement     Ph+ Precursor B-Cell Acute Lymphoblastic Leukemia, in MRD Remission:  - 2-3 weeks of symptoms  - Presenting WBC > 440 k/uL, hyperleukocytosis  - Start of Hydroxyurea (1500 mg PO Q8) 2320 on 5/27/2022  - discontinued after only 55 hours  - No steroid pretreatment  - 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  - Testicular status NEGATIVE     - Flow cytometry of both 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, FISH positive for BCR-ABL1 translocation  - Enrolled on [facility] [id number]  - Initially enrolled on [facility] [id number] - but 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     - Enrolled on [facility] [id number] and began study 6/13/2022  - Started imatinib therapy 6/3/2022  - End of Induction IA and IB MRD negative  - Imatinib dose increased to 400 mg PO AM and 250 mg PM 9/29/2022  * Note:  All imatinib doses given inpatient rounded down to 600 mg        - Imatinib held for Septic Shock 12/27/2022-1/8/2023         - Imatinib 600 mg PO daily restarted 1/8/2023 inpatient        - Imatinib 400 mg PO QAM and 250 mg PO QHS 1/14/2023-1/17/2023        - Imatinib 600 mg PO QAM 1/17/2023 - present        SHORTY GOULD Arm B, Delayed Intensification, Day 54:   ** Vincristine 2 mg IV x1 given on 2/7/2023 in CIS  ** Continue imatinib 600 mg PO Q AM                     Chemotherapy Related Pancytopenia:  - WBC 0.2, Hgb 8.2 gm/dL, platelets 15,000/microliters  - Transfuse for hemoglobin less than 7 gm/dL or with symptoms  - Transfuse for platelets less than 10,000/microliters or with symptoms  - S/p pRBCs transfusion on 2/8/2023, 2/9/2023  - S/p platelet transfusion 2/11/2023  - CBC daily      At risk for deep vein thrombosis due to pegaspargase:  - Platelets still low  - Will hold off from starting apixaban  - Will order SCD, encourage ambulation as tolerated     At Risk of Opportunistic Lung Infection:  - Bactrim DS PO BID Sat and Sun for PJP prophylaxis     Sixth Cranial Nerve Palsy (IMPROVED/RESOLVED):  - Followed by Dr. Carranza      Right Sided Chest Wall Pain: improved   - Exam consistent with musculoskeletal strain  - Much improved, no pain with inhalation      Central Access:   - R Port-A-Cath in place      Research Participant:           [facility] - Source Data         Diagnosis: Ph+ Precursor B-Cell Acute Lymphoblastic Leukemia     Disease Status: EOI1A MRD NEGATIVE, EOI1B RD NEGATIVE, CNS3c, testicular negative, HSV1 IgG POSITIVE, CMV IgG NEGATIVE, VARICELLA IgG POSITIVE     Active Studies: [id number], [id number]                                                                                                      Inactive Studies: [id number]                                                                                                                                                Optional Studies: None             Protocol: International Phase 3 trial in [place] chromosome-positive acute lymphoblastic  leukemia (Ph+ ALL) testing imatinib in combination with two different cytotoxic chemotherapy backbones.      Treatment Plan: [id number](OS), AR-Arm B, Delayed Intensification, Day 54     Height: 1.650 m      Weight: 64.2kg       BSA: 1.72 m²   (Delayed Intensification Day 29 1/17/2023)                                                                                                                                           Performance Status: Karnofsky 70, ECOG  2 (1/31/2023)     Treatment Plan Medications:  (100% adherent with imatinib)     Imatinib dose adjusted for weight at DI, Day 29 to Imatinib 600 mg PO daily in AM     Evaluations / Study Labs:  (2/11/2023) : None     Therapy Given: (2/11/2023):  Imatinib 600 mg PO QAM    Toxicities:  **Grade 3 febrile neutropenia on 2/7/2023 (ANC <1000/mm3 with a single temperature of >38.3 degrees C (101 degrees F) or a sustained temperature of >=38 degrees C (100.4 degrees F) for more than one hour)  ** Grade 3 diarrhea on 2/6/2023 (Increase of >=7 stools per day  over baseline; hospitalization indicated, limiting self care ADL): Resolved 2/7/2023  ** Grade 2 diarrhea on 2/7/2023 (Increase of 4 - 6 stools per day  over baseline; limiting instrumental ADL) Resolved 2/8/2023  ** Grade 3 vomiting on 2/6/2023 and 2/7/2023 (hospitalization indicated) Resolved 2/8/2023  ** Grade 2 Myositis (Moderate pain associated with weakness; pain limiting, instrumental ADL): Present on admission on 2/7/2023  ** Grade 3 Hypoxemia (Decreased oxygen saturation at rest (e.g., pulse oximeter  <88% or PaO2 <=55 mm Hg), started 2/10/2023        Disposition: Pending resolution of fever, rising ANC, better management of myositis. Plan discussed with bedside nurse, JULY and orthopedic surgeon. Tried calling mother and awaiting her call back.     Alyce Duenas MD  Pediatric Hematology / Oncology  [facility]  Cell.  [phone]  [facility]. [phone]

## 2023-02-12 NOTE — PROGRESS NOTES
Lab called with critical result of WBC 0.4, Hgb 7.9, Hct 23.6, Platelets 42 at 0400. Critical lab result read back to lab. Results read back to Kinsey AZUL RN from Dr. Soares at 0400.  Dr. Duenas notified of critical lab result at [id number].  Critical lab result read back by Dr. Duenas.

## 2023-02-13 ENCOUNTER — APPOINTMENT (OUTPATIENT)
Dept: RADIOLOGY | Facility: MEDICAL CENTER | Age: 23
DRG: 500 | End: 2023-02-13
Attending: PEDIATRICS
Payer: COMMERCIAL

## 2023-02-13 LAB
ANISOCYTOSIS BLD QL SMEAR: ABNORMAL
BASOPHILS # BLD AUTO: 0 % (ref 0–1.8)
BASOPHILS # BLD: 0 K/UL (ref 0–0.12)
CMV DNA SPEC QL NAA+PROBE: DETECTED
EOSINOPHIL # BLD AUTO: 0 K/UL (ref 0–0.51)
EOSINOPHIL NFR BLD: 0 % (ref 0–6.9)
ERYTHROCYTE [DISTWIDTH] IN BLOOD BY AUTOMATED COUNT: 49.7 FL (ref 35.9–50)
HCT VFR BLD AUTO: 23 % (ref 42–52)
HGB BLD-MCNC: 7.7 G/DL (ref 14–18)
HSV1 DNA CSF QL NAA+PROBE: NOT DETECTED
HSV2 DNA CSF QL NAA+PROBE: NOT DETECTED
LYMPHOCYTES # BLD AUTO: 0.08 K/UL (ref 1–4.8)
LYMPHOCYTES NFR BLD: 13 % (ref 22–41)
MANUAL DIFF BLD: NORMAL
MCH RBC QN AUTO: 28.7 PG (ref 27–33)
MCHC RBC AUTO-ENTMCNC: 33.5 G/DL (ref 33.7–35.3)
MCV RBC AUTO: 85.8 FL (ref 81.4–97.8)
MICROCYTES BLD QL SMEAR: ABNORMAL
MONOCYTES # BLD AUTO: 0.09 K/UL (ref 0–0.85)
MONOCYTES NFR BLD AUTO: 14.3 % (ref 0–13.4)
MORPHOLOGY BLD-IMP: NORMAL
NEUTROPHILS # BLD AUTO: 0.44 K/UL (ref 1.82–7.42)
NEUTROPHILS NFR BLD: 67.5 % (ref 44–72)
NEUTS BAND NFR BLD MANUAL: 5.2 % (ref 0–10)
NRBC # BLD AUTO: 0 K/UL
NRBC BLD-RTO: 0 /100 WBC
OVALOCYTES BLD QL SMEAR: NORMAL
PLATELET # BLD AUTO: 38 K/UL (ref 164–446)
PLATELET BLD QL SMEAR: NORMAL
PMV BLD AUTO: 10.5 FL (ref 9–12.9)
POIKILOCYTOSIS BLD QL SMEAR: NORMAL
RBC # BLD AUTO: 2.68 M/UL (ref 4.7–6.1)
RBC BLD AUTO: PRESENT
SPECIMEN SOURCE: ABNORMAL
SPECIMEN SOURCE: NORMAL
VANCOMYCIN PEAK SERPL-MCNC: 19.9 UG/ML (ref 20–40)
VANCOMYCIN TROUGH SERPL-MCNC: <4 UG/ML (ref 10–20)
WBC # BLD AUTO: 0.6 K/UL (ref 4.8–10.8)

## 2023-02-13 PROCEDURE — 93971 EXTREMITY STUDY: CPT | Mod: LT

## 2023-02-13 PROCEDURE — 700111 HCHG RX REV CODE 636 W/ 250 OVERRIDE (IP): Performed by: PEDIATRICS

## 2023-02-13 PROCEDURE — 99232 SBSQ HOSP IP/OBS MODERATE 35: CPT | Performed by: PEDIATRICS

## 2023-02-13 PROCEDURE — 700105 HCHG RX REV CODE 258: Performed by: PEDIATRICS

## 2023-02-13 PROCEDURE — A9270 NON-COVERED ITEM OR SERVICE: HCPCS | Performed by: PEDIATRICS

## 2023-02-13 PROCEDURE — 85025 COMPLETE CBC W/AUTO DIFF WBC: CPT

## 2023-02-13 PROCEDURE — 700102 HCHG RX REV CODE 250 W/ 637 OVERRIDE(OP): Performed by: PEDIATRICS

## 2023-02-13 PROCEDURE — 80202 ASSAY OF VANCOMYCIN: CPT | Mod: 91

## 2023-02-13 PROCEDURE — 770001 HCHG ROOM/CARE - MED/SURG/GYN PRIV*

## 2023-02-13 PROCEDURE — 85007 BL SMEAR W/DIFF WBC COUNT: CPT

## 2023-02-13 PROCEDURE — 99232 SBSQ HOSP IP/OBS MODERATE 35: CPT | Performed by: ORTHOPAEDIC SURGERY

## 2023-02-13 RX ORDER — ACETAMINOPHEN 325 MG/1
650 TABLET ORAL EVERY 6 HOURS PRN
Status: DISCONTINUED | OUTPATIENT
Start: 2023-02-18 | End: 2023-02-26

## 2023-02-13 RX ORDER — MORPHINE SULFATE 4 MG/ML
4 INJECTION INTRAVENOUS ONCE
Status: COMPLETED | OUTPATIENT
Start: 2023-02-13 | End: 2023-02-13

## 2023-02-13 RX ORDER — ACETAMINOPHEN 325 MG/1
650 TABLET ORAL EVERY 6 HOURS
Status: DISCONTINUED | OUTPATIENT
Start: 2023-02-13 | End: 2023-02-18

## 2023-02-13 RX ADMIN — FAMOTIDINE 20 MG: 20 TABLET ORAL at 06:37

## 2023-02-13 RX ADMIN — SENNOSIDES AND DOCUSATE SODIUM 1 TABLET: 50; 8.6 TABLET ORAL at 06:36

## 2023-02-13 RX ADMIN — ACETAMINOPHEN 650 MG: 325 TABLET, FILM COATED ORAL at 01:52

## 2023-02-13 RX ADMIN — ONDANSETRON 8 MG: 2 INJECTION INTRAMUSCULAR; INTRAVENOUS at 01:04

## 2023-02-13 RX ADMIN — VANCOMYCIN HYDROCHLORIDE 1250 MG: 500 INJECTION, POWDER, LYOPHILIZED, FOR SOLUTION INTRAVENOUS at 10:26

## 2023-02-13 RX ADMIN — ACETAMINOPHEN 650 MG: 325 TABLET, FILM COATED ORAL at 07:39

## 2023-02-13 RX ADMIN — CHLORHEXIDINE GLUCONATE 0.12% ORAL RINSE 15 ML: 1.2 LIQUID ORAL at 10:24

## 2023-02-13 RX ADMIN — ACETAMINOPHEN 650 MG: 325 TABLET, FILM COATED ORAL at 17:54

## 2023-02-13 RX ADMIN — VANCOMYCIN HYDROCHLORIDE 1000 MG: 500 INJECTION, POWDER, LYOPHILIZED, FOR SOLUTION INTRAVENOUS at 17:52

## 2023-02-13 RX ADMIN — Medication: at 18:02

## 2023-02-13 RX ADMIN — IMATINIB MESYLATE 400 MG: 400 TABLET, FILM COATED ORAL at 06:37

## 2023-02-13 RX ADMIN — FAMOTIDINE 20 MG: 20 TABLET ORAL at 16:00

## 2023-02-13 RX ADMIN — ONDANSETRON 8 MG: 2 INJECTION INTRAMUSCULAR; INTRAVENOUS at 16:00

## 2023-02-13 RX ADMIN — MORPHINE SULFATE 4 MG: 4 INJECTION INTRAVENOUS at 17:56

## 2023-02-13 RX ADMIN — CEFEPIME 2 G: 2 INJECTION, POWDER, FOR SOLUTION INTRAVENOUS at 00:31

## 2023-02-13 RX ADMIN — CHLORHEXIDINE GLUCONATE 0.12% ORAL RINSE 15 ML: 1.2 LIQUID ORAL at 21:00

## 2023-02-13 RX ADMIN — DEXTROSE AND SODIUM CHLORIDE: 5; 450 INJECTION, SOLUTION INTRAVENOUS at 16:04

## 2023-02-13 RX ADMIN — DEXTROSE AND SODIUM CHLORIDE: 5; 450 INJECTION, SOLUTION INTRAVENOUS at 13:18

## 2023-02-13 RX ADMIN — ONDANSETRON 8 MG: 2 INJECTION INTRAMUSCULAR; INTRAVENOUS at 07:39

## 2023-02-13 RX ADMIN — OXYCODONE HYDROCHLORIDE 5 MG: 5 TABLET ORAL at 16:00

## 2023-02-13 RX ADMIN — OXYCODONE HYDROCHLORIDE 10 MG: 5 TABLET ORAL at 00:22

## 2023-02-13 RX ADMIN — ACETAMINOPHEN 650 MG: 325 TABLET, FILM COATED ORAL at 14:44

## 2023-02-13 RX ADMIN — CHLORHEXIDINE GLUCONATE 0.12% ORAL RINSE 15 ML: 1.2 LIQUID ORAL at 17:55

## 2023-02-13 RX ADMIN — Medication 1000 UNITS: at 06:36

## 2023-02-13 RX ADMIN — CEFEPIME 2 G: 2 INJECTION, POWDER, FOR SOLUTION INTRAVENOUS at 07:40

## 2023-02-13 RX ADMIN — IMATINIB MESYLATE 200 MG: 100 TABLET, FILM COATED ORAL at 06:36

## 2023-02-13 RX ADMIN — CEFEPIME 2 G: 2 INJECTION, POWDER, FOR SOLUTION INTRAVENOUS at 16:04

## 2023-02-13 RX ADMIN — OXYCODONE HYDROCHLORIDE 10 MG: 5 TABLET ORAL at 06:35

## 2023-02-13 ASSESSMENT — PAIN DESCRIPTION - PAIN TYPE
TYPE: ACUTE PAIN

## 2023-02-13 NOTE — PROGRESS NOTES
Pt demonstrates ability to turn self in bed without assistance of staff. Patient and family understands importance in prevention of skin breakdown, ulcers, and potential infection. Hourly rounding in effect. RN skin check complete.   Devices in place include: nasal cannula, pulse ox, chest port.  Skin assessed under devices: Yes.  Confirmed HAPI identified on the following date: NA   Location of HAPI: NA.  Wound Care RN following: No.  The following interventions are in place: Patient repositions self, ambulation, pillows in place for support, skin assessed under devices.

## 2023-02-13 NOTE — CARE PLAN
Problem: Knowledge Deficit - Standard  Goal: Patient and family/care givers will demonstrate understanding of plan of care, disease process/condition, diagnostic tests and medications  Outcome: Progressing     Problem: Psychosocial  Goal: Patient will experience minimized separation anxiety and fear  Outcome: Progressing  Goal: Spiritual and cultural needs will be incorporated into hospitalization  Outcome: Progressing     Problem: Security Measures  Goal: Patient and family will demonstrate understanding of security measures  Outcome: Progressing     Problem: Discharge Barriers/Planning  Goal: Patient's continuum of care needs are met  Outcome: Progressing     Problem: Respiratory  Goal: Patient will achieve/maintain optimum respiratory ventilation and gas exchange  Outcome: Progressing     Problem: Fluid Volume  Goal: Fluid volume balance will be maintained  Outcome: Progressing     Problem: Nutrition - Standard  Goal: Patient's nutritional and fluid intake will be adequate or improve  Outcome: Progressing     Problem: Urinary Elimination  Goal: Establish and maintain regular urinary output  Outcome: Progressing     Problem: Bowel Elimination  Goal: Establish and maintain regular bowel function  Outcome: Progressing     Problem: Self Care  Goal: Patient will have the ability to perform ADLs independently or with assistance (bathe, groom, dress, toilet and feed)  Outcome: Progressing     Problem: Pain - Standard  Goal: Alleviation of pain or a reduction in pain to the patient’s comfort goal  Outcome: Progressing     Problem: Skin Integrity  Goal: Skin integrity is maintained or improved  Outcome: Progressing     Problem: Fall Risk  Goal: Patient will remain free from falls  Outcome: Progressing         The patient is Stable - Low risk of patient condition declining or worsening    Shift Goals  Clinical Goals: (P) Pain control, rest, stable vital signs  Patient Goals: (P) Pain control, go home  Family Goals: (P)  LELA at this time - No family present    Progress made toward(s) clinical / shift goals:  Shift goals met     Patient is not progressing towards the following goals:

## 2023-02-13 NOTE — PROGRESS NOTES
Received report from [facility], student nurse, and Donaldo Louis RN, at the bedside. Patient not in any distress, vital signs stable and patient tachycardic, MD aware. Patient in bed, bed in low position, call light within reach, needs addressed, and no further questions at this time.

## 2023-02-13 NOTE — PROGRESS NOTES
Pt demonstrates ability to turn self in bed without assistance of staff. Patient and family understands importance in prevention of skin breakdown, ulcers, and potential infection. Hourly rounding in effect. RN skin check complete.   Devices in place include: Pulse ox, chest port, nasal canulla.  Skin assessed under devices: Yes.  Confirmed HAPI identified on the following date: NA   Location of HAPI: NA.  Wound Care RN following: No.  The following interventions are in place: Repositioning, ambulation, skin assessed under devices.

## 2023-02-13 NOTE — PROGRESS NOTES
Pediatric Orthopedic Consult Note:    Luke Haddad M.D.  Date & Time note created:    2/12/2023  10:00 a.m.     Referring MD:  Dr. Alyce Duenas    Patient ID:  Name:             Tomas Jean-Baptiste   YOB: 2000  Age:                 22 y.o.  male   MRN:               [id number]                                                     Chief complaint: Left shoulder pain    History of present illness:  Tomas Jean-Baptiste 22 y.o. male who was admitted due to neutropenic fever from treatment of his leukemia. He started having left shoulder pain about 1 weeks ago. He feels that today may be a little worse, requiring pain medication to reduce the pain.    Interval History (2/11/2023):  JULY was examined this morning. His pain profile is relatively unchanged from yesterday. The pain medication helps quite a bit. Still has pain in anterior left shoulder.    Interval History (2/12/2023):  JULY was re-examined this morning. His pain profile is still unchanged from prior. The pain medication continues to help out quite a bit. Still has pain in anterior left shoulder. His labs have improved as has his fever curve.    Interval History (2/13/2023):  JULY was re-examined today. His pain profile is slightly worsened than prior days. The pain medication continues to help, but his left anterior shoulder pain persists. His labs have improved as has his fever curve, but his arm has become significantly more swollen overnight.    Past medical history:   Past Medical History:   Diagnosis Date    Cancer (HCC)     Leukoma        Past surgical history:   Past Surgical History:   Procedure Laterality Date    CATH PLACEMENT Right 8/29/2022    Procedure: INSERTION, CATHETER;  Surgeon: Simona Moise M.D.;  Location: SURGERY [facility];  Service: Ent       Family history:   Family History   Problem Relation Age of Onset    No Known Problems Mother     Stroke Maternal Grandmother     Diabetes Paternal Grandfather      Hypertension Paternal Grandfather     Hyperlipidemia Paternal Grandfather     Cancer Neg Hx     Heart Disease Neg Hx        Social history:   Social History     Socioeconomic History    Marital status: Single     Spouse name: Not on file    Number of children: Not on file    Years of education: Not on file    Highest education level: Not on file   Occupational History    Not on file   Tobacco Use    Smoking status: Never    Smokeless tobacco: Never   Vaping Use    Vaping Use: Never used   Substance and Sexual Activity    Alcohol use: Never    Drug use: Never    Sexual activity: Not Currently   Other Topics Concern    Behavioral problems Not Asked    Interpersonal relationships Not Asked    Sad or not enjoying activities Not Asked    Suicidal thoughts Not Asked    Poor school performance Not Asked    Reading difficulties Not Asked    Speech difficulties Not Asked    Writing difficulties Not Asked    Inadequate sleep Not Asked    Excessive TV viewing Not Asked    Excessive video game use Not Asked    Inadequate exercise Not Asked    Sports related Not Asked    Poor diet Not Asked    Family concerns for drug/alcohol abuse Not Asked    Poor oral hygiene Not Asked    Bike safety Not Asked    Family concerns vehicle safety Not Asked   Social History Narrative    Not on file     Social Determinants of Health     Financial Resource Strain: Not on file   Food Insecurity: Not on file   Transportation Needs: Not on file   Physical Activity: Not on file   Stress: Not on file   Social Connections: Not on file   Intimate Partner Violence: Not on file   Housing Stability: Not on file       Current medications:   Current Facility-Administered Medications   Medication Dose    senna-docusate (PERICOLACE or SENOKOT S) 8.6-50 MG per tablet 1 Tablet  1 Tablet    lidocaine (LIDODERM) 5 % 1 Patch  1 Patch    MD Alert...Vancomycin per Pharmacy      vancomycin (VANCOCIN) 1,250 mg in  mL IVPB  1,250 mg    LORazepam (ATIVAN) injection  0.5 mg  0.5 mg    acetaminophen (Tylenol) tablet 650 mg  650 mg    oxyCODONE immediate-release (ROXICODONE) tablet 5-10 mg  5-10 mg    lidocaine-prilocaine (EMLA) 2.5-2.5 % cream      D5 1/2 NS infusion      cefepime (Maxipime) 2 g in  mL IVPB  2 g    ondansetron (ZOFRAN) syringe/vial injection 8 mg  8 mg    famotidine (PEPCID) tablet 20 mg  20 mg    chlorhexidine (PERIDEX) 0.12 % solution 15 mL  15 mL    imatinib (Gleevec) tablet 200 mg  200 mg    imatinib (Gleevec) tablet 400 mg  400 mg    therapeutic multivitamin-minerals (THERAGRAN-M) tablet 1 Tablet  1 Tablet    vitamin D3 (cholecalciferol) tablet 1,000 Units  1,000 Units       Allergies:  Allergies   Allergen Reactions    Amoxicillin Rash     Reacted as an infant. No swelling or airway problems.  Tolerated cefepime June 2022       Immunizations:  Immunization History   Administered Date(s) Administered    Dtap Vaccine 2001, 01/29/2002, 06/24/2002, 01/15/2003, 01/23/2006    HIB Vaccine (ACTHIB/HIBERIX) 2001, 01/29/2002, 09/25/2002    HPV Quadrivalent Vaccine (GARDASIL) - HISTORICAL DATA 12/15/2014, 02/17/2015, 06/03/2015    Hepatitis A Vaccine, Ped/Adol 10/03/2003, 01/23/2006    Hepatitis B Vaccine Adolescent/Pediatric 2001, 01/29/2002, 09/25/2002    IPV 2001, 01/29/2002, 06/24/2002, 01/23/2006    Influenza (IM) Preservative Free - HISTORICAL DATA 12/09/2011, 10/17/2013, 09/18/2015, 10/05/2016    Influenza Seasonal Injectable - Historical Data 10/12/2012    Influenza Vac Subunit Quad Inj (Pf) 11/08/2017    Influenza Vaccine Quad Inj (Pf) 11/17/2014, 09/24/2018, 09/27/2019    MMR Vaccine 09/25/2002, 01/15/2003    Meningococcal Conjugate Vaccine MCV4 (MENVEO) 09/24/2018    Meningococcal Conjugate Vaccine MCV4 (Menactra) 12/11/2012    Pneumococcal Vaccine (PCV7) - HISTORICAL DATA 2001, 03/08/2002, 06/24/2002    Tdap Vaccine 10/12/2012    Varicella Vaccine Live 09/25/2002, 01/17/2007       Review of systems:   Constitutional:  Fatigued, fevcers,  HENT: Normal breathing, but alopecia  Eyes: Negative for photophobia, discharge and redness.   Respiratory: Negative for cough, wheezing and stridor.    Cardiovascular: Negative for leg swelling.   Gastrointestinal: Negative for constipation, diarrhea, nausea and vomiting.   Genitourinary: No renal disease or abnormalities   Musculoskeletal: Negative for back pain and neck pain, but (+) for left shoulder pain  Skin: Negative for rash.   Neurological: Negative for tremors, sensory change, speech change, focal weakness, seizures, loss of consciousness and weakness.   Endo/Heme/Allergies: thrombocytopenia (+), neutropenia (+)      Physical examination:   Vitals:    02/13/23 0000 02/13/23 0400 02/13/23 0750 02/13/23 1200   BP:   113/57 (P) 121/65   Pulse: 93 90 (!) 109 (!) (P) 109   Resp:   20 (P) 19   Temp: 36.7 °C (98 °F) 36.8 °C (98.2 °F) 36.7 °C (98 °F) (P) 36.6 °C (97.9 °F)   TempSrc: Temporal Temporal Temporal (P) Oral   SpO2: 98% 98% 95% (P) 97%   Weight:       Height:         Physical Exam    Weak-appearing, but appropriate & conversive    Left upper extremity:   Left wrist, fingers, elbow, & forearm full ROM, TTP (-)   Left shoulder painful with AROM, TTP (+) anteriorly, full, PROM with mild pain   Neuro intract (+)   Skin warm to touch, but febrile & equal to other limbs    ANCILLARY DATA REVIEWED:     Lab Data Review:  Recent Labs     02/11/23  0604 02/12/23  0323 02/13/23  0150   WBC 0.2* 0.4* 0.6*   RBC 2.90* 2.76* 2.68*   HEMOGLOBIN 8.2* 7.9* 7.7*   HEMATOCRIT 24.6* 23.6* 23.0*   MCV 84.8 85.5 85.8   MCH 28.3 28.6 28.7   MCHC 33.3* 33.5* 33.5*   RDW 47.3 49.2 49.7   PLATELETCT 15* 42* 38*   MPV  --  11.5 10.5       Recent Labs     02/11/23  0604 02/12/23  0323   SODIUM 137 135   POTASSIUM 3.4* 3.5*   CHLORIDE 106 102   CO2 26 25   GLUCOSE 118* 126*   BUN 6* 6*   CREATININE 0.33* 0.41*   CALCIUM 7.8* 7.5*             MICRO:  Blood cultures from 2/9/2023 - no growth     Imaging  XR's left  shoulder - negative for fracture or acute injury  MRI left shoulder - inflammation in muscles / myositis without focal collection or abscess; mild amount of joint fluid with fluid surrounding the biceps tendon; osseous changes, likely AVN vs. Myelodysplasia-related, no evidence of osteomyelitis or intra-osseous abscess    ASSESSMENT AND PLAN:  Left shoulder myositis, possible biceps tendon involvement, possible developing abscess  Continue current antibiotics  Recommend ice pack to shoulder and encourage ROM of LUE  Agree with U/S of shoulder to determine if there is a DVT or abscess / collection  Discussed with Dr. Rodriguez  Will continue to follow    Luke Haddad M.D.  Pediatric Orthopedics and Scoliosis  [facility]

## 2023-02-13 NOTE — PROGRESS NOTES
1637 - Patient vomited Oxy 5 mg, Pepcid 20 mg immediately after taking them. Pills visualized in the emesis. Meds wasted and re-administered.

## 2023-02-13 NOTE — PROGRESS NOTES
Pediatric Orthopedic Consult Note:    Luke Haddad M.D.  Date & Time note created:    2/12/2023  10:00 a.m.     Referring MD:  Dr. Alyce Duenas    Patient ID:  Name:             Tomas Jean-Baptiste   YOB: 2000  Age:                 22 y.o.  male   MRN:               [id number]                                                     Chief complaint: Left shoulder pain    History of present illness:  Tomas Jean-Baptiste 22 y.o. male who was admitted due to neutropenic fever from treatment of his leukemia. He started having left shoulder pain about 1 weeks ago. He feels that today may be a little worse, requiring pain medication to reduce the pain.    Interval History (2/11/2023):  JULY was examined this morning. His pain profile is relatively unchanged from yesterday. The pain medication helps quite a bit. Still has pain in anterior left shoulder.    Interval History (2/12/2023):  JULY was re-examined this morning. His pain profile is still unchanged from prior. The pain medication continues to help out quite a bit. Still has pain in anterior left shoulder. His labs have improved as has his fever curve.    Past medical history:   Past Medical History:   Diagnosis Date    Cancer (HCC)     Leukoma        Past surgical history:   Past Surgical History:   Procedure Laterality Date    CATH PLACEMENT Right 8/29/2022    Procedure: INSERTION, CATHETER;  Surgeon: Simona Moise M.D.;  Location: SURGERY [facility];  Service: Ent       Family history:   Family History   Problem Relation Age of Onset    No Known Problems Mother     Stroke Maternal Grandmother     Diabetes Paternal Grandfather     Hypertension Paternal Grandfather     Hyperlipidemia Paternal Grandfather     Cancer Neg Hx     Heart Disease Neg Hx        Social history:   Social History     Socioeconomic History    Marital status: Single     Spouse name: Not on file    Number of children: Not on file    Years of education: Not on file     Highest education level: Not on file   Occupational History    Not on file   Tobacco Use    Smoking status: Never    Smokeless tobacco: Never   Vaping Use    Vaping Use: Never used   Substance and Sexual Activity    Alcohol use: Never    Drug use: Never    Sexual activity: Not Currently   Other Topics Concern    Behavioral problems Not Asked    Interpersonal relationships Not Asked    Sad or not enjoying activities Not Asked    Suicidal thoughts Not Asked    Poor school performance Not Asked    Reading difficulties Not Asked    Speech difficulties Not Asked    Writing difficulties Not Asked    Inadequate sleep Not Asked    Excessive TV viewing Not Asked    Excessive video game use Not Asked    Inadequate exercise Not Asked    Sports related Not Asked    Poor diet Not Asked    Family concerns for drug/alcohol abuse Not Asked    Poor oral hygiene Not Asked    Bike safety Not Asked    Family concerns vehicle safety Not Asked   Social History Narrative    Not on file     Social Determinants of Health     Financial Resource Strain: Not on file   Food Insecurity: Not on file   Transportation Needs: Not on file   Physical Activity: Not on file   Stress: Not on file   Social Connections: Not on file   Intimate Partner Violence: Not on file   Housing Stability: Not on file       Current medications:   Current Facility-Administered Medications   Medication Dose    senna-docusate (PERICOLACE or SENOKOT S) 8.6-50 MG per tablet 1 Tablet  1 Tablet    lidocaine (LIDODERM) 5 % 1 Patch  1 Patch    MD Alert...Vancomycin per Pharmacy      vancomycin (VANCOCIN) 1,250 mg in  mL IVPB  1,250 mg    LORazepam (ATIVAN) injection 0.5 mg  0.5 mg    acetaminophen (Tylenol) tablet 650 mg  650 mg    oxyCODONE immediate-release (ROXICODONE) tablet 5-10 mg  5-10 mg    lidocaine-prilocaine (EMLA) 2.5-2.5 % cream      D5 1/2 NS infusion      cefepime (Maxipime) 2 g in  mL IVPB  2 g    ondansetron (ZOFRAN) syringe/vial injection 8 mg  8 mg     famotidine (PEPCID) tablet 20 mg  20 mg    chlorhexidine (PERIDEX) 0.12 % solution 15 mL  15 mL    imatinib (Gleevec) tablet 200 mg  200 mg    imatinib (Gleevec) tablet 400 mg  400 mg    therapeutic multivitamin-minerals (THERAGRAN-M) tablet 1 Tablet  1 Tablet    vitamin D3 (cholecalciferol) tablet 1,000 Units  1,000 Units       Allergies:  Allergies   Allergen Reactions    Amoxicillin Rash     Reacted as an infant. No swelling or airway problems.  Tolerated cefepime June 2022       Immunizations:  Immunization History   Administered Date(s) Administered    Dtap Vaccine 2001, 01/29/2002, 06/24/2002, 01/15/2003, 01/23/2006    HIB Vaccine (ACTHIB/HIBERIX) 2001, 01/29/2002, 09/25/2002    HPV Quadrivalent Vaccine (GARDASIL) - HISTORICAL DATA 12/15/2014, 02/17/2015, 06/03/2015    Hepatitis A Vaccine, Ped/Adol 10/03/2003, 01/23/2006    Hepatitis B Vaccine Adolescent/Pediatric 2001, 01/29/2002, 09/25/2002    IPV 2001, 01/29/2002, 06/24/2002, 01/23/2006    Influenza (IM) Preservative Free - HISTORICAL DATA 12/09/2011, 10/17/2013, 09/18/2015, 10/05/2016    Influenza Seasonal Injectable - Historical Data 10/12/2012    Influenza Vac Subunit Quad Inj (Pf) 11/08/2017    Influenza Vaccine Quad Inj (Pf) 11/17/2014, 09/24/2018, 09/27/2019    MMR Vaccine 09/25/2002, 01/15/2003    Meningococcal Conjugate Vaccine MCV4 (MENVEO) 09/24/2018    Meningococcal Conjugate Vaccine MCV4 (Menactra) 12/11/2012    Pneumococcal Vaccine (PCV7) - HISTORICAL DATA 2001, 03/08/2002, 06/24/2002    Tdap Vaccine 10/12/2012    Varicella Vaccine Live 09/25/2002, 01/17/2007       Review of systems:   Constitutional: Fatigued, fevcers,  HENT: Normal breathing, but alopecia  Eyes: Negative for photophobia, discharge and redness.   Respiratory: Negative for cough, wheezing and stridor.    Cardiovascular: Negative for leg swelling.   Gastrointestinal: Negative for constipation, diarrhea, nausea and vomiting.   Genitourinary: No  renal disease or abnormalities   Musculoskeletal: Negative for back pain and neck pain, but (+) for left shoulder pain  Skin: Negative for rash.   Neurological: Negative for tremors, sensory change, speech change, focal weakness, seizures, loss of consciousness and weakness.   Endo/Heme/Allergies: thrombocytopenia (+), neutropenia (+)      Physical examination:   Vitals:    02/12/23 2200 02/13/23 0000 02/13/23 0400 02/13/23 0750   BP:    (P) 113/57   Pulse: (!) 112 93 90 (!) (P) 109   Resp:    (P) 20   Temp:  36.7 °C (98 °F) 36.8 °C (98.2 °F) (P) 36.7 °C (98 °F)   TempSrc:  Temporal Temporal (P) Temporal   SpO2: 97% 98% 98% (P) 95%   Weight:       Height:         Physical Exam    Weak-appearing, but appropriate & conversive    Left upper extremity:   Left wrist, fingers, elbow, & forearm full ROM, TTP (-)   Left shoulder painful with AROM, TTP (+) anteriorly, full, PROM with mild pain   Neuro intract (+)   Skin warm to touch, but febrile & equal to other limbs    ANCILLARY DATA REVIEWED:     Lab Data Review:  Recent Labs     02/11/23  0604 02/12/23  0323 02/13/23  0150   WBC 0.2* 0.4* 0.6*   RBC 2.90* 2.76* 2.68*   HEMOGLOBIN 8.2* 7.9* 7.7*   HEMATOCRIT 24.6* 23.6* 23.0*   MCV 84.8 85.5 85.8   MCH 28.3 28.6 28.7   MCHC 33.3* 33.5* 33.5*   RDW 47.3 49.2 49.7   PLATELETCT 15* 42* 38*   MPV  --  11.5 10.5       Recent Labs     02/11/23  0604 02/12/23  0323   SODIUM 137 135   POTASSIUM 3.4* 3.5*   CHLORIDE 106 102   CO2 26 25   GLUCOSE 118* 126*   BUN 6* 6*   CREATININE 0.33* 0.41*   CALCIUM 7.8* 7.5*             MICRO:  Blood cultures from 2/9/2023 - no growth     Imaging  XR's left shoulder - negative for fracture or acute injury  MRI left shoulder - inflammation in muscles / myositis without focal collection or abscess; mild amount of joint fluid with fluid surrounding the biceps tendon; osseous changes, likely AVN vs. Myelodysplasia-related, no evidence of osteomyelitis or intra-osseous abscess    ASSESSMENT AND  PLAN:  Left shoulder myositis, possible biceps tendon involvment  Currently antibiotics  Will allow to eat today as not needed to go to surgery. Will check clinical course tomorrow . to see if surgical intervention is needed  Recommend ice pack to shoulder and encourage ROM of LUE  Will follow  Discussed with Dr. Henrique Haddad M.D.  Pediatric Orthopedics and Scoliosis  [facility]

## 2023-02-13 NOTE — CARE PLAN
The patient is Stable - Low risk of patient condition declining or worsening    Shift Goals  Clinical Goals: Pain control, rest, stable vital signs  Patient Goals: Pain control, go home  Family Goals: LELA at this time - No family present    Progress made toward(s) clinical / shift goals:  Patient able to rest in between assessments, pain somewhat manageable for patient with PRNs, and vital signs remained stable.     Problem: Urinary Elimination  Goal: Establish and maintain regular urinary output  Outcome: Progressing     Problem: Bowel Elimination  Goal: Establish and maintain regular bowel function  Outcome: Progressing     Problem: Skin Integrity  Goal: Skin integrity is maintained or improved  Outcome: Progressing     Problem: Fall Risk  Goal: Patient will remain free from falls  Outcome: Progressing       Patient is not progressing towards the following goals:

## 2023-02-13 NOTE — CARE PLAN
Problem: Discharge Barriers/Planning  Goal: Patient's continuum of care needs are met  Outcome: Progressing.continue abx  for now     Problem: Pain - Standard  Goal: Alleviation of pain or a reduction in pain to the patient’s comfort goal  Outcome: Progressing. Us today of left arm   The patient is Stable - Low risk of patient condition declining or worsening    Shift Goals  Clinical Goals: Pain control, wean oxygen as tolerated, rest, remain free from infection, adequate PO intake  Patient Goals: Ambulation, pain control, rest  Family Goals: Unable to assess, family not present at this time    Progress made toward(s) clinical / shift goals:  as above    Patient is not progressing towards the following goals:

## 2023-02-13 NOTE — PROGRESS NOTES
Late Entry    Pt demonstrates ability to turn self in bed without assistance of staff. Patient and family understands importance in prevention of skin breakdown, ulcers, and potential infection. Hourly rounding in effect. RN skin check complete.   Devices in place include: Right chest port, pulse oximetry, nasal cannula, BP (intermittently).  Skin assessed under devices: Yes.  Confirmed HAPI identified on the following date: n/a   Location of HAPI: n/.a.  Wound Care RN following: No.  The following interventions are in place: Patient turns self frequently in bed, assess skin under/around device sites and switch device sites with assessments.

## 2023-02-13 NOTE — PROGRESS NOTES
Pharmacy Vancomycin Kinetics Note for 2/13/2023     22 y.o. male on Vancomycin day # 4     Vancomycin Indication (AUC Dosing): Skin/skin structure infection    Provider specified end date: 02/16/23    Active Antibiotics (From admission, onward)      Ordered     Ordering Provider       Mon Feb 13, 2023  2:18 PM    02/13/23 1418  vancomycin (VANCOCIN) 1,000 mg in  mL IVPB  (vancomycin (VANCOCIN) IV (LD + Maintenance))  EVERY 8 HOURS        Note to Pharmacy: Per P&T vanco per pharmacy Protocol    River Rodriguez M.D.       Fri Feb 10, 2023  6:38 PM    02/10/23 1838  MD Alert...Vancomycin per Pharmacy  PHARMACY TO DOSE        Question:  Indication(s) for vancomycin?  Answer:  Skin and soft tissue infection    BRIAN Alanis Feb 7, 2023 12:35 PM    02/07/23 1235  cefepime (Maxipime) 2 g in  mL IVPB  EVERY 8 HOURS         Alyce Duenas M.D.            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 Pt is also neturopenic. Pt has been receiving cefepime and vanco therapy added per orthopedic recommendations.    Allergies:     Amoxicillin     Pertinent cultures to date:     Results       Procedure Component Value Units Date/Time    MRSA By PCR (Amp) [[id number]] Collected: 02/11/23 1330    Order Status: Completed Specimen: Respirate from Nares Updated: 02/11/23 1953     MRSA by PCR Negative    Narrative:      Protective  Collected By: [id number] SONIA JESUS    Blood Culture [[id number]] Collected: 02/09/23 1254    Order Status: Completed Specimen: Blood from Line Updated: 02/10/23 0740     Significant Indicator NEG     Source BLD     Site LINE     Culture Result No Growth  Note: Blood cultures are incubated for 5 days and  are monitored continuously.Positive blood cultures  are called to  "the RN and reported as soon as  they are identified.      Narrative:      Protective  Per Hospital Policy: Only change Specimen Src: to \"Line\" if  specified by physician order.  Chest Port    Blood Culture [[id number]]     Order Status: Canceled Specimen: Blood from Line             Labs:     Estimated Creatinine Clearance: 247.9 mL/min (A) (by C-G formula based on SCr of 0.41 mg/dL (L)).  Recent Labs     23  0604 23  0150   WBC 0.2* 0.4* 0.6*   NEUTSPOLYS 64.20 79.40* 67.50   BANDSSTABS  --   --  5.20     Recent Labs     23  0623   BUN 6* 6*   CREATININE 0.33* 0.41*   ALBUMIN 2.7*  --        Intake/Output Summary (Last 24 hours) at 2023 1420  Last data filed at 2023 1226  Gross per 24 hour   Intake 2684.02 ml   Output 2525 ml   Net 159.02 ml      /65   Pulse (!) 109   Temp 36.6 °C (97.9 °F) (Oral)   Resp 19   Ht 1.651 m (5' 5\")   Wt 62 kg (136 lb 11 oz)   SpO2 97%  Temp (24hrs), Av.7 °C (98.1 °F), Min:36.6 °C (97.9 °F), Max:36.8 °C (98.3 °F)      List concerns for Vancomycin clearance:     None    Pharmacokinetics:     AUC kinetics:   Ke (hr ^-1): 0.1248 hr^-1  Half life: 5.55 hr  Clearance: 5.029  Estimated TDD: 2514.5  Estimated Dose: 796  Estimated interval: 7.6    Trough kinetics:   Recent Labs     23  0150 23  1025   VANCOTROUGH  --  <4.0*   VANCOPEAK 19.9*  --        A/P:     -  Vancomycin dose: change vancomycin to 1000 mg IV q8h starting tonight at 1800    -  Next vancomycin level(s):    - next couple of days (not ordered)    -  Predicted vancomycin AUC from initial AUC test calculator: 597 mg·hr/L    -  Comments: vancomycin AUC not able to be calculated as trough < 4. Levels collected appropriately. Renal function has remained stable with no major concerns for accumulation at this time. Pharmacy will continue to monitor and will obtain levels in the next couple of days to ensure tolerating new dose.    Thank you!    Funmi" Rajiv, PharmD, BCPS

## 2023-02-13 NOTE — PROGRESS NOTES
Vinny from Lab called with critical result of WBC:0.6, ANC:0.39 at 0230. Critical lab result read back to Vinny.   Dr. Duenas notified of critical lab result at 0230.  Critical lab result read back by Dr. Duenas.

## 2023-02-13 NOTE — PROGRESS NOTES
"Patient complained of a \"lump\" he felt on his upper left arm/bicep area overnight. This NAP palpated the area and did not feel the \"lump. MONTSERRAT Pino palpated the area as well and noted a tiny bump on his anterior bicep. Additionally, new swelling was noted and the entire extremity felt hot to the touch. Will continue to monitor and notify MD during rounds.   "

## 2023-02-13 NOTE — PROGRESS NOTES
"Pediatric Hematology/Oncology  Daily Progress Note      Patient Name:  Tomas Jean-Baptiste  : 2000  MRN: [id number]    Location of Service:  [facility]  Date of Service: 2023  Time: 10:21 PM    Hospital Day: 6    Protocol / Treatment Plan: [place] Chromosome Precursor B-Cell Acute Lymphoblastic Leukemia, ON STUDY [id number], Delayed Intensification, Day 55    SUBJECTIVE:     Afebrile overnight with Tmax of 99.5 F. Tachycardia improving. Hypoxic to 85-86% overnight requiring 0.5 L oxygen. JULY reports sleeping well overnight and now feels a bit energetic.  Still with L shoulder pain which improves after taking oxycodone. NPO for possible drainage of fluid today. Nausea well controlled and appetite improving. No emesis.  Now having hard stool. No abdominal pain/cramps. No neurologic changes. No easy bruising or rash.     Review of Systems:     Constitutional: Afebrile, tiredness improving. Improving appetite.  HENT: Negative for auditory changes or ear pain, no nosebleeds, no congestion and rhinorrhea, no sore throat.  No mouth sores.  Eyes: Negative for visual changes.  Respiratory: Negative for shortness of breath or noisy breathing.   Cardiovascular: Negative for chest pain and leg swelling.    Gastrointestinal: Negative for abdominal pain, blood in stool.  Previously reported nausea/vomiting and diarrhea resolved. Now with constipation.  Genitourinary: Negative for dysuria.  Musculoskeletal: Positive for L shoulder pain.  Skin: Negative for rash or skin infection.  Neurological: Negative for numbness, tingling, sensory changes, weakness or headaches.    Endo/Heme/Allergies: No bruising/bleeding easily.    Psychiatric/Behavioral: More upbeat today    OBJECTIVE:     Max Temp: Temp (24hrs), Av.6 °C (99.6 °F), Min:36.7 °C (98 °F), Max:39.2 °C (102.6 °F)      Vitals: /66   Pulse (!) 119   Temp 36.7 °C (98 °F) (Temporal)   Resp 16   Ht 1.651 m (5' 5\")   " Wt 62 kg (136 lb 11 oz)   SpO2 96%   BMI 22.75 kg/m²     I/O:   Intake/Output Summary (Last 24 hours) at 2/12/2023 2221  Last data filed at 2/12/2023 1936  Gross per 24 hour   Intake 2929.33 ml   Output 2275 ml   Net 654.33 ml         Labs:    Most Recent Hematology Labs:    Recent Labs     02/10/23  0745 02/11/23  0604 02/12/23  0323   WBC 0.2* 0.2* 0.4*   RBC 3.10* 2.90* 2.76*   HEMOGLOBIN 8.9* 8.2* 7.9*   HEMATOCRIT 26.4* 24.6* 23.6*   MCV 85.2 84.8 85.5   MCH 28.7 28.3 28.6   RDW 46.1 47.3 49.2   PLATELETCT 23* 15* 42*   MPV 10.5  --  11.5   NEUTSPOLYS 36.80* 64.20 79.40*   LYMPHOCYTES 57.90* 16.40* 11.10*   MONOCYTES 5.30 18.70* 9.50   EOSINOPHILS 0.00 0.00 0.00   BASOPHILS 0.00 0.00 0.00   RBCMORPHOLO Normal Present  --            Most Recent Metabolic Panel:     Latest Reference Range & Units 02/12/23 03:23   Sodium 135 - 145 mmol/L 135   Potassium 3.6 - 5.5 mmol/L 3.5 (L)   Chloride 96 - 112 mmol/L 102   Co2 20 - 33 mmol/L 25   Anion Gap 7.0 - 16.0  8.0   Glucose 65 - 99 mg/dL 126 (H)   Bun 8 - 22 mg/dL 6 (L)   Creatinine 0.50 - 1.40 mg/dL 0.41 (L)   GFR (CKD-EPI) >60 mL/min/1.73 m 2 158   Calcium 8.5 - 10.5 mg/dL 7.5 (L)      Latest Reference Range & Units 02/12/23 03:23   CPK Total 0 - 154 U/L 17       Blood culture 2/7/2022 (port): NGTD  Blood culture from 2/9/2023 (port): NGTD  CMV PCR 2/9/2023: Pending  EBV PCR 2/9/2023: Negative  HSV PCR 2/9/2023: Pending    X-ray L shoulder: 2/8/2023  FINDINGS:  Clavicle is intact.  AC joint is preserved.  Visualized proximal humerus is intact and normally located.  Visualized LEFT chest is unremarkable.     IMPRESSION:     No fracture or dislocation of LEFT shoulder.    MRI L shoulder: 2/10/2023:  1.  Diffuse abnormal marrow signal involving the visualized portions of the humerus, scapula and clavicle characterized by mottled serpiginous heterogeneous areas of increased T2 and decreased T1 signal. This most likely is related to the patient's   history of leukemia  and represents a diffuse marrow disorder.     2.  Some periosteal enhancement surrounding the proximal humeral diaphysis and metaphysis with some slight cortical irregularity posteriorly and medially. This may be related to neoplastic process however infectious process could potentially have this   appearance as well.     3.  Diffuse edema and enhancement of the deltoid, latissimus dorsi, short head of the biceps, coracobrachialis, supraspinatus, infraspinatus and subscapularis muscles with a small amount of intermuscular edema/fluid suggesting myositis.     4.  No focal abscess.     5.  Small glenohumeral joint effusion.     6.  No evidence of fracture.    Physical Exam:    Constitutional: Sitting up in bed. C/o L shoulder pain, Tired. More alert today  HENT: Normocephalic and atraumatic. Alopecia.  No rhinorrhea. Oropharynx is clear and moist.   Eyes: Conjunctivae are normal. EOMI.   Neck: Normal range of motion of neck, no adenopathy.    Cardiovascular: Tachycardic, regular rhythm. DP/radial pulses 2+, cap refill < 2 sec  Pulmonary/Chest: Effort normall. No respiratory distress. Symmetric expansion.  No crackles or wheezes. PAC to chest wall. Dressing C/D/I.  Abdomen: Soft. Bowel sounds are normal. No distension and no mass. There is no hepatosplenomegaly.    Genitourinary:  Deferred  Musculoskeletal: Restriction in movement of L shoulder (both passive and active). No bruising or lump noted along the arm/forearm muscles. Edema bilateral lower extremities resolved.  Neurological: Alert and oriented to person and place. Exhibits normal muscle tone bilaterally in upper and lower extremities. Gait normal. Coordination normal.    Skin: Skin is warm, dry and pink.  No rash or evidence of skin infection.    Psychiatric: Mood improved greatly from yesterday.    ASSESSMENT AND PLAN:     Tomas Jean-Baptiste is a 22 y.o. male who presented to the [facility] for  Day 50 chemotherapy with Vincristine on 2/7/2023. During that appointment he was noted to febrile with chills and dehydrated. Hence, decision made to admit him to pediatric ott for management of febrile neutropenia and dehydration.    Overall feeling tired but improving. Counts trending up. Orthopedic surgeon following patient. Made NPO overnight once again for possible drainage of fluid, however once again deferred  given stable exam. Receiving oxycodone for pain. JULY continues to be afebrile.  Patient's nausea resolved. No emesis. Now having hard stool, requesting stool softener. Hypoxia when deep sleep, likely also due to atelectasis. On 0.5 L oxygen via NC.    Febrile Neutropenia in an immunocompromised host: Etiology not clear: Counts trending up which is encouraging. Afebrile.   - Previous history of gram negative septic shock  - Patient having fever with chills  - Blood culture from port 2/7/2023: NGTD  - Repeat blood culture from port 2/9/2023: NGTD  - 1 L NS bolus x 1 in CIS  - Initially on D5 0.45 % NS at 100 ml/hr, decreased to 50 ml/hr  - IV Cefepime x 1 in CIS, continue every 8 hrs  - IV Vancomycin started on 2/10/2023, 1250 mg every 12 hrs  - Tylenol PO x 1 for fever in CIS  - Scheduled tylenol every 6 hrs since 2/9/2023, consider changing to PRN tomorrow     Left shoulder pain: Myositis/intermuscular edema/fluid  - Pain medication and sling helping  - Norco discontinued, Switched to oxycodone on 2/9/2023  - Patient denying PCA or lidoderm patch  - Encourage rest, warm packs  - X-ray L shoulder today 2/8/2023: not concerning  - MRI shoulder 2/10/2023: showing diffuse myositis and some intermuscular edema/fluid  - Orthopedic surgeon consulted: Appreciate recs  - Added IV Vancomycin on 2/10/2023    Tachycardia: orthostatic vs cardiac issues  - Some PVCs noted on tele but hemodynamically stable  - Recently admitted with severe septic shock, hence consider cardiomyopathy  - Consider getting an ECHO if  persistent even after IV hydration/blood transfusion    Diarrhea: Likely from cytarabine vs enteritis: Resolved, formed stool  - C.diff toxin assay : negative PCR ([id number] negative)   - Stool culture: NGTD  - Will continue to monitor    Constipation secondary to opioid use:  - Start senna-docusate BID  - encouraged ambulation as tolerated.     Nausea/Vomiting secondary to chemotherapy: resolved  - Zofran IV x 1 in CIS prior to VCR  - Continue Zofran scheduled every 8 hrs, consider PRN as patient getting better  - Continue to monitor     Retrosternal chest pain : likely from LLOYD, improved  - Continue famotidine 20 mg BID  - Continue to monitor    Hypoxemia: likely from opioids/atelectasis  - 85-86% with deep sleep  - Requiring 0.5 L oxygen at night/ even in am  - Will  restart IVF at 50 ml/hr given patient on vancomycin, encourage ambulation as tolerated  - Ordered incentive spirometry today  - If continues, will get CXR to r/o pleural effusion/edema, consider lasix IV x 1     Ph+ Precursor B-Cell Acute Lymphoblastic Leukemia, in MRD Remission:  - 2-3 weeks of symptoms  - Presenting WBC > 440 k/uL, hyperleukocytosis  - Start of Hydroxyurea (1500 mg PO Q8) 2320 on 5/27/2022  - discontinued after only 55 hours  - No steroid pretreatment  - 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  - Testicular status NEGATIVE     - Flow cytometry of both 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, FISH positive for BCR-ABL1 translocation  - Enrolled on [facility] [id number]  - Initially enrolled on [facility] [id number] - but 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     - Enrolled on [facility] [id number] and began study 6/13/2022  - Started imatinib therapy 6/3/2022  - End of Induction IA and IB MRD negative  - Imatinib dose increased to 400 mg PO AM and 250 mg PM 9/29/2022  * Note:  All imatinib doses given inpatient rounded down to 600 mg        - Imatinib held for Septic Shock 12/27/2022-1/8/2023        -  Imatinib 600 mg PO daily restarted 1/8/2023 inpatient        - Imatinib 400 mg PO QAM and 250 mg PO QHS 1/14/2023-1/17/2023        - Imatinib 600 mg PO QAM 1/17/2023 - present        SHORTY GOULD Arm B, Delayed Intensification, Day 54:   ** Vincristine 2 mg IV x1 given on 2/7/2023 in CIS  ** Continue imatinib 600 mg PO Q AM                     Chemotherapy Related Pancytopenia:  - WBC 0.2, Hgb 8.2 gm/dL, platelets 15,000/microliters  - Transfuse for hemoglobin less than 7 gm/dL or with symptoms  - Transfuse for platelets less than 10,000/microliters or with symptoms  - S/p pRBCs transfusion on 2/8/2023, 2/9/2023  - S/p platelet transfusion 2/11/2023  - CBC daily      At risk for deep vein thrombosis due to pegaspargase:  - Platelets still low  - Will hold off from starting apixaban  - Will order SCD, encourage ambulation as tolerated     At Risk of Opportunistic Lung Infection:  - Bactrim DS PO BID Sat and Sun for PJP prophylaxis     Sixth Cranial Nerve Palsy (IMPROVED/RESOLVED):  - Followed by Dr. Carranza      Right Sided Chest Wall Pain: improved   - Exam consistent with musculoskeletal strain  - Much improved, no pain with inhalation      Central Access:   - R Port-A-Cath in place      Research Participant:           [facility] - Source Data         Diagnosis: Ph+ Precursor B-Cell Acute Lymphoblastic Leukemia     Disease Status: EOI1A MRD NEGATIVE, EOI1B RD NEGATIVE, CNS3c, testicular negative, HSV1 IgG POSITIVE, CMV IgG NEGATIVE, VARICELLA IgG POSITIVE     Active Studies: [id number], [id number]                                                                                                      Inactive Studies: [id number]                                                                                                                                                Optional Studies: None             Protocol: International Phase 3 trial in [place] chromosome-positive acute lymphoblastic leukemia  (Ph+ ALL) testing imatinib in combination with two different cytotoxic chemotherapy backbones.      Treatment Plan: [id number](OS), AR-Arm B, Delayed Intensification, Day 55     Height: 1.650 m      Weight: 64.2kg       BSA: 1.72 m²   (Delayed Intensification Day 29 1/17/2023)                                                                                                                                           Performance Status: Karnofsky 70, ECOG  2 (1/31/2023)     Treatment Plan Medications:  (100% adherent with imatinib)     Imatinib dose adjusted for weight at DI, Day 29 to Imatinib 600 mg PO daily in AM     Evaluations / Study Labs:  (2/12/2023) : None     Therapy Given: (2/12/2023):  Imatinib 600 mg PO QAM    Toxicities:  **Grade 3 febrile neutropenia on 2/7/2023 (ANC <1000/mm3 with a single temperature of >38.3 degrees C (101 degrees F) or a sustained temperature of >=38 degrees C (100.4 degrees F) for more than one hour)  ** Grade 3 diarrhea on 2/6/2023 (Increase of >=7 stools per day  over baseline; hospitalization indicated, limiting self care ADL): Resolved 2/7/2023  ** Grade 2 diarrhea on 2/7/2023 (Increase of 4 - 6 stools per day  over baseline; limiting instrumental ADL) Resolved 2/8/2023  ** Grade 3 vomiting on 2/6/2023 and 2/7/2023 (hospitalization indicated) Resolved 2/8/2023  ** Grade 2 Myositis (Moderate pain associated with weakness; pain limiting, instrumental ADL): Present on admission on 2/7/2023  ** Grade 3 Hypoxemia (Decreased oxygen saturation at rest (e.g., pulse oximeter  <88% or PaO2 <=55 mm Hg), started 2/10/2023        Disposition: Pending resolution of fever, rising ANC, better management of myositis. Plan discussed with bedside nurse, JULY and orthopedic surgeon. Tried calling mother and awaiting her call back.     Alyce Duenas MD  Pediatric Hematology / Oncology  [facility]  Cell.  [phone]  [facility]. [phone]

## 2023-02-14 ENCOUNTER — APPOINTMENT (OUTPATIENT)
Dept: CARDIOLOGY | Facility: MEDICAL CENTER | Age: 23
DRG: 500 | End: 2023-02-14
Attending: PEDIATRICS
Payer: COMMERCIAL

## 2023-02-14 LAB
BACTERIA BLD CULT: NORMAL
BASOPHILS # BLD AUTO: 0 % (ref 0–1.8)
BASOPHILS # BLD: 0 K/UL (ref 0–0.12)
EOSINOPHIL # BLD AUTO: 0 K/UL (ref 0–0.51)
EOSINOPHIL NFR BLD: 0 % (ref 0–6.9)
ERYTHROCYTE [DISTWIDTH] IN BLOOD BY AUTOMATED COUNT: 50.9 FL (ref 35.9–50)
FUNGUS SPEC CULT: NORMAL
FUNGUS SPEC FUNGUS STN: NORMAL
HCT VFR BLD AUTO: 23.7 % (ref 42–52)
HGB BLD-MCNC: 7.8 G/DL (ref 14–18)
HYPOCHROMIA BLD QL SMEAR: ABNORMAL
LYMPHOCYTES # BLD AUTO: 0.16 K/UL (ref 1–4.8)
LYMPHOCYTES NFR BLD: 13.3 % (ref 22–41)
MANUAL DIFF BLD: NORMAL
MCH RBC QN AUTO: 28 PG (ref 27–33)
MCHC RBC AUTO-ENTMCNC: 32.9 G/DL (ref 33.7–35.3)
MCV RBC AUTO: 84.9 FL (ref 81.4–97.8)
MONOCYTES # BLD AUTO: 0.2 K/UL (ref 0–0.85)
MONOCYTES NFR BLD AUTO: 16.8 % (ref 0–13.4)
MORPHOLOGY BLD-IMP: NORMAL
NEUTROPHILS # BLD AUTO: 0.84 K/UL (ref 1.82–7.42)
NEUTROPHILS NFR BLD: 69.9 % (ref 44–72)
NRBC # BLD AUTO: 0 K/UL
NRBC BLD-RTO: 0 /100 WBC
PLATELET # BLD AUTO: 45 K/UL (ref 164–446)
PLATELET BLD QL SMEAR: NORMAL
PMV BLD AUTO: 12 FL (ref 9–12.9)
RBC # BLD AUTO: 2.79 M/UL (ref 4.7–6.1)
RBC BLD AUTO: PRESENT
SIGNIFICANT IND 70042: NORMAL
SIGNIFICANT IND 70042: NORMAL
SITE SITE: NORMAL
SITE SITE: NORMAL
SOURCE SOURCE: NORMAL
SOURCE SOURCE: NORMAL
TOXIC GRANULES BLD QL SMEAR: SLIGHT
WBC # BLD AUTO: 1.2 K/UL (ref 4.8–10.8)

## 2023-02-14 PROCEDURE — 85025 COMPLETE CBC W/AUTO DIFF WBC: CPT

## 2023-02-14 PROCEDURE — 700111 HCHG RX REV CODE 636 W/ 250 OVERRIDE (IP): Performed by: PEDIATRICS

## 2023-02-14 PROCEDURE — A9270 NON-COVERED ITEM OR SERVICE: HCPCS | Performed by: PEDIATRICS

## 2023-02-14 PROCEDURE — 93306 TTE W/DOPPLER COMPLETE: CPT

## 2023-02-14 PROCEDURE — 99232 SBSQ HOSP IP/OBS MODERATE 35: CPT | Performed by: PEDIATRICS

## 2023-02-14 PROCEDURE — 85007 BL SMEAR W/DIFF WBC COUNT: CPT

## 2023-02-14 PROCEDURE — 99232 SBSQ HOSP IP/OBS MODERATE 35: CPT | Performed by: ORTHOPAEDIC SURGERY

## 2023-02-14 PROCEDURE — 700105 HCHG RX REV CODE 258: Performed by: PEDIATRICS

## 2023-02-14 PROCEDURE — 700102 HCHG RX REV CODE 250 W/ 637 OVERRIDE(OP): Performed by: PEDIATRICS

## 2023-02-14 PROCEDURE — 770001 HCHG ROOM/CARE - MED/SURG/GYN PRIV*

## 2023-02-14 RX ORDER — HEPARIN SODIUM (PORCINE) LOCK FLUSH IV SOLN 100 UNIT/ML 100 UNIT/ML
300-500 SOLUTION INTRAVENOUS PRN
Status: DISCONTINUED | OUTPATIENT
Start: 2023-02-14 | End: 2023-03-01 | Stop reason: HOSPADM

## 2023-02-14 RX ADMIN — ACETAMINOPHEN 650 MG: 325 TABLET, FILM COATED ORAL at 00:20

## 2023-02-14 RX ADMIN — ACETAMINOPHEN 650 MG: 325 TABLET, FILM COATED ORAL at 12:02

## 2023-02-14 RX ADMIN — IMATINIB MESYLATE 200 MG: 100 TABLET, FILM COATED ORAL at 05:55

## 2023-02-14 RX ADMIN — Medication: at 15:52

## 2023-02-14 RX ADMIN — CHLORHEXIDINE GLUCONATE 0.12% ORAL RINSE 15 ML: 1.2 LIQUID ORAL at 08:17

## 2023-02-14 RX ADMIN — VANCOMYCIN HYDROCHLORIDE 1000 MG: 500 INJECTION, POWDER, LYOPHILIZED, FOR SOLUTION INTRAVENOUS at 10:04

## 2023-02-14 RX ADMIN — CHLORHEXIDINE GLUCONATE 0.12% ORAL RINSE 15 ML: 1.2 LIQUID ORAL at 20:40

## 2023-02-14 RX ADMIN — FAMOTIDINE 20 MG: 20 TABLET ORAL at 04:14

## 2023-02-14 RX ADMIN — IMATINIB MESYLATE 400 MG: 400 TABLET, FILM COATED ORAL at 05:54

## 2023-02-14 RX ADMIN — ONDANSETRON 8 MG: 2 INJECTION INTRAMUSCULAR; INTRAVENOUS at 00:21

## 2023-02-14 RX ADMIN — CEFEPIME 2 G: 2 INJECTION, POWDER, FOR SOLUTION INTRAVENOUS at 15:47

## 2023-02-14 RX ADMIN — ONDANSETRON 8 MG: 2 INJECTION INTRAMUSCULAR; INTRAVENOUS at 08:17

## 2023-02-14 RX ADMIN — ONDANSETRON 8 MG: 2 INJECTION INTRAMUSCULAR; INTRAVENOUS at 15:47

## 2023-02-14 RX ADMIN — ACETAMINOPHEN 650 MG: 325 TABLET, FILM COATED ORAL at 17:46

## 2023-02-14 RX ADMIN — ACETAMINOPHEN 650 MG: 325 TABLET, FILM COATED ORAL at 23:54

## 2023-02-14 RX ADMIN — CEFEPIME 2 G: 2 INJECTION, POWDER, FOR SOLUTION INTRAVENOUS at 23:54

## 2023-02-14 RX ADMIN — HEPARIN 500 UNITS: 100 SYRINGE at 16:48

## 2023-02-14 RX ADMIN — Medication 1000 UNITS: at 05:57

## 2023-02-14 RX ADMIN — DEXTROSE AND SODIUM CHLORIDE: 5; 450 INJECTION, SOLUTION INTRAVENOUS at 08:17

## 2023-02-14 RX ADMIN — ONDANSETRON 8 MG: 2 INJECTION INTRAMUSCULAR; INTRAVENOUS at 23:54

## 2023-02-14 RX ADMIN — ACETAMINOPHEN 650 MG: 325 TABLET, FILM COATED ORAL at 05:57

## 2023-02-14 RX ADMIN — FAMOTIDINE 20 MG: 20 TABLET ORAL at 15:47

## 2023-02-14 RX ADMIN — VANCOMYCIN HYDROCHLORIDE 1000 MG: 500 INJECTION, POWDER, LYOPHILIZED, FOR SOLUTION INTRAVENOUS at 02:43

## 2023-02-14 RX ADMIN — VANCOMYCIN HYDROCHLORIDE 1000 MG: 500 INJECTION, POWDER, LYOPHILIZED, FOR SOLUTION INTRAVENOUS at 17:47

## 2023-02-14 RX ADMIN — CEFEPIME 2 G: 2 INJECTION, POWDER, FOR SOLUTION INTRAVENOUS at 00:22

## 2023-02-14 RX ADMIN — CEFEPIME 2 G: 2 INJECTION, POWDER, FOR SOLUTION INTRAVENOUS at 08:18

## 2023-02-14 RX ADMIN — CHLORHEXIDINE GLUCONATE 0.12% ORAL RINSE 15 ML: 1.2 LIQUID ORAL at 17:46

## 2023-02-14 ASSESSMENT — FIBROSIS 4 INDEX: FIB4 SCORE: 1.95

## 2023-02-14 ASSESSMENT — PAIN DESCRIPTION - PAIN TYPE
TYPE: ACUTE PAIN

## 2023-02-14 NOTE — CARE PLAN
The patient is Stable - Low risk of patient condition declining or worsening    Shift Goals  Clinical Goals: Increase PO intake, pain control, stable vital signs, wean O2 as tolerated, rest  Patient Goals: Keep pain controlled, rest, go home  Family Goals: Unable to asses, family not present at this time    Progress made toward(s) clinical / shift goals:  on pca dilaudid. Arm swollen.     Patient is not progressing towards the following goals:      Problem: Knowledge Deficit - Standard  Goal: Patient and family/care givers will demonstrate understanding of plan of care, disease process/condition, diagnostic tests and medications  Outcome: Not Progressing     Problem: Psychosocial  Goal: Patient will experience minimized separation anxiety and fear  Outcome: Not Progressing  Goal: Spiritual and cultural needs will be incorporated into hospitalization  Outcome: Not Progressing     Problem: Security Measures  Goal: Patient and family will demonstrate understanding of security measures  Outcome: Not Progressing     Problem: Discharge Barriers/Planning  Goal: Patient's continuum of care needs are met  Outcome: Not Progressing     Problem: Respiratory  Goal: Patient will achieve/maintain optimum respiratory ventilation and gas exchange. Oxygen needs increased. No new orders  Outcome: Not Progressing     Problem: Fluid Volume  Goal: Fluid volume balance will be maintained  Outcome: Not Progressing     Problem: Nutrition - Standard  Goal: Patient's nutritional and fluid intake will be adequate or improve  Outcome: Not Progressing     Problem: Urinary Elimination  Goal: Establish and maintain regular urinary output  Outcome: Not Progressing     Problem: Bowel Elimination  Goal: Establish and maintain regular bowel function  Outcome: Not Progressing     Problem: Self Care  Goal: Patient will have the ability to perform ADLs independently or with assistance (bathe, groom, dress, toilet and feed)  Outcome: Not Progressing      Problem: Pain - Standard  Goal: Alleviation of pain or a reduction in pain to the patient’s comfort goal  Outcome: Not Progressing     Problem: Skin Integrity  Goal: Skin integrity is maintained or improved  Outcome: Not Progressing     Problem: Fall Risk  Goal: Patient will remain free from falls  Outcome: Not Progressing

## 2023-02-14 NOTE — PROGRESS NOTES
Pt demonstrates ability to turn self in bed without assistance of staff. Patient and family understands importance in prevention of skin breakdown, ulcers, and potential infection. Hourly rounding in effect. RN skin check complete.   Devices in place include: Pulse ox, cardiac halter monitor, chest port, nasal canula.  Skin assessed under devices: Yes.  Confirmed HAPI identified on the following date: NA   Location of HAPI: NA.  Wound Care RN following: No.  The following interventions are in place: ambulation, repositioning, skin assessed under devices.

## 2023-02-14 NOTE — PROGRESS NOTES
Report received from octavio Wilkerson. Patient's left shoulder/arm continues to be swollen and tight, no acute changes from yesterday evening. Mild respiratory depression overnight secondary to the PCA requiring 2 liters of oxygen while sleeping per report, back on 0.5 liters at this time.

## 2023-02-14 NOTE — DIETARY
Nutrition Services Brief Update:    Day 7 of admit.  Tomas Jean-Baptiste is a 22 y.o. male with admitting DX of ALL (acute lymphoid leukemia) in remission (HCC) [C91.01]    Current Diet: Regular with supplements     Problem: Nutritional:  Goal: Achieve adequate nutritional intake  Description: Patient will consume >50% of meals  Outcome: Progressing     Per flowsheets, pt is eating >50% of 2 most recent meals. Met with pt at bedside. Pt shared that his appetite is improving though is feeling fatigued at this time due to treatment. Pt confirms that family continues to bring in food and PO intake fluctuates though is drinking at least 1 Boost VHC per day. RD encouraged pt to continue drinking Boost VHC with a goal of at least 2 per day if able. RD will add additional flavors to meals.     RD following.

## 2023-02-14 NOTE — CARE PLAN
The patient is Watcher - Medium risk of patient condition declining or worsening    Shift Goals  Clinical Goals: Pain control, wean oxygen as tolerated, rest, remain free from infection, adequate PO intake  Patient Goals: Ambulation, pain control, rest  Family Goals: Unable to assess, family not present at this time    Progress made toward(s) clinical / shift goals:    Problem: Psychosocial  Goal: Patient will experience minimized separation anxiety and fear  Outcome: Progressing     Problem: Security Measures  Goal: Patient and family will demonstrate understanding of security measures  Outcome: Progressing     Problem: Pain - Standard  Goal: Alleviation of pain or a reduction in pain to the patient’s comfort goal  Outcome: Not Progressing     Problem: Skin Integrity  Goal: Skin integrity is maintained or improved  Outcome: Progressing     Problem: Fall Risk  Goal: Patient will remain free from falls  Outcome: Progressing       Patient is not progressing towards the following goals:      Problem: Pain - Standard  Goal: Alleviation of pain or a reduction in pain to the patient’s comfort goal  Outcome: Not Progressing

## 2023-02-14 NOTE — PROGRESS NOTES
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    Review of Systems:     Constitutional: Afebrile. Decreased appetite.  HENT: Negative for nosebleeds, congestion, rhinorrhea, sore throat, mouth sores.  Eyes: Negative for visual changes.  Respiratory: Negative for shortness of breath or cough.   Cardiovascular: Negative for chest pain and leg swelling.    Gastrointestinal: Negative for nausea, vomiting, abdominal pain, diarrhea, constipation.    Skin: Negative for rash or skin infection..  Neurological: Negative for numbness, tingling, sensory changes, weakness or headaches.    Endo/Heme/Allergies: No bruising/bleeding easily.    Psychiatric/Behavioral: Upset about arm pain.     Medications:    Current Facility-Administered Medications:     vancomycin (VANCOCIN) 1,000 mg in  mL IVPB, 1,000 mg, Intravenous, Q8HR, River Rodriguez M.D.    morphine 4 MG/ML injection 4 mg, 4 mg, Intravenous, Once, River Rodriguez,  M.D.    Pharmacy Consult Request ...Pain Management Review 1 Each, 1 Each, Other, PHARMACY TO DOSE, River Rodriguez M.D.    acetaminophen (Tylenol) tablet 650 mg, 650 mg, Oral, Q6HRS **FOLLOWED BY** [START ON 2/18/2023] acetaminophen (Tylenol) tablet 650 mg, 650 mg, Oral, Q6HRS PRN, River Rodriguez M.D.    HYDROmorphone (DILAUDID) 0.2 mg/mL in 50 mL NS (PCA), , Intravenous, Continuous, River Rodriguez M.D.    senna-docusate (PERICOLACE or SENOKOT S) 8.6-50 MG per tablet 1 Tablet, 1 Tablet, Oral, DAILY, Alyce Duenas M.D., 1 Tablet at 02/13/23 0636    lidocaine (LIDODERM) 5 % 1 Patch, 1 Patch, Transdermal, Q24HRS PRN, Alyce Duenas M.D.    MD Alert...Vancomycin per Pharmacy, , Other, PHARMACY TO DOSE, Alyce Duenas M.D.    LORazepam (ATIVAN) injection 0.5 mg, 0.5 mg, Intravenous, Q6HRS PRN, Alyce Duenas M.D.    oxyCODONE immediate-release (ROXICODONE) tablet 5-10 mg, 5-10 mg, Oral, Q6HRS PRN, Alyce Duenas M.D., 5 mg at 02/13/23 1600    lidocaine-prilocaine (EMLA) 2.5-2.5 % cream, , Topical, PRN, Alyce Duenas M.D.    D5 1/2 NS infusion, , Intravenous, Continuous, Alyce Duenas M.D., Last Rate: 75 mL/hr at 02/13/23 1641, Rate Change at 02/13/23 1641    cefepime (Maxipime) 2 g in  mL IVPB, 2 g, Intravenous, Q8HRS, Alyce Duenas M.D., Stopped at 02/13/23 1634    ondansetron (ZOFRAN) syringe/vial injection 8 mg, 8 mg, Intravenous, Q8HRS, Alyce Duenas M.D., 8 mg at 02/13/23 1600    famotidine (PEPCID) tablet 20 mg, 20 mg, Oral, BID, Alyce Duenas M.D., 20 mg at 02/13/23 1600    chlorhexidine (PERIDEX) 0.12 % solution 15 mL, 15 mL, Mouth/Throat, 4X/DAY, Alyce Duenas M.D., 15 mL at 02/13/23 1024    imatinib (Gleevec) tablet 200 mg, 200 mg, Oral, DAILY, Alyce Duenas M.D., 200 mg at 02/13/23 0636    imatinib (Gleevec) tablet 400 mg, 400 mg, Oral, DAILY, Alyce Duenas M.D., 400 mg at 02/13/23 0637    therapeutic multivitamin-minerals (THERAGRAN-M) tablet 1 Tablet, 1 Tablet, Oral, DAILY, Alyce Duenas M.D., 1  "Tablet at 23 0631    vitamin D3 (cholecalciferol) tablet 1,000 Units, 1,000 Units, Oral, DAILY, Alyce Duenas M.D., 1,000 Units at 23 0636    OBJECTIVE:     Max Temp: Temp (24hrs), Av.7 °C (98.1 °F), Min:36.6 °C (97.9 °F), Max:36.8 °C (98.2 °F)      Vitals: BP (P) 107/58   Pulse (!) (P) 128   Temp (P) 36.8 °C (98.2 °F) (Temporal)   Resp (!) (P) 22 Comment: In pain  Ht 1.651 m (5' 5\")   Wt 62 kg (136 lb 11 oz)   SpO2 (P) 88% Comment: In Pain  BMI 22.75 kg/m²       Intake/Output Summary (Last 24 hours) at 2023 1729  Last data filed at 2023 1634  Gross per 24 hour   Intake 2710.4 ml   Output 2800 ml   Net -89.6 ml       Labs:   Latest Reference Range & Units 23 03:23 23 01:50   WBC 4.8 - 10.8 K/uL 0.4 (LL) 0.6 (LL)   RBC 4.70 - 6.10 M/uL 2.76 (L) 2.68 (L)   Hemoglobin 14.0 - 18.0 g/dL 7.9 (L) 7.7 (L)   Hematocrit 42.0 - 52.0 % 23.6 (L) 23.0 (L)   MCV 81.4 - 97.8 fL 85.5 85.8   Platelet Count 164 - 446 K/uL 42 (L) 38 (L)   MPV 9.0 - 12.9 fL 11.5 10.5   Neutrophils-Polys 44.00 - 72.00 % 79.40 (H) (C) 67.50   Neutrophils (Absolute) 1.82 - 7.42 K/uL 0.32 (LL) (C) 0.44 (LL)   Bands-Stabs 0.00 - 10.00 %  5.20   Lymphocytes 22.00 - 41.00 % 11.10 (L) (C) 13.00 (L)   Lymphs (Absolute) 1.00 - 4.80 K/uL 0.04 (L) (C) 0.08 (L)   Monocytes 0.00 - 13.40 % 9.50 (C) 14.30 (H)     Imaging:  Reading Physician Reading Date Result Priority   No Reading Provider Prelim 2023    Ameya nEnis M.D.  [phone] 2023      Narrative & Impression      Upper Extremity   Venous Duplex Report      Vascular Laboratory   CONCLUSIONS   No LEFT upper extremity DVT.      REBECA CHEEMA      Exam Date:     2023 12:20      Priority:     Routine      Ordering Physician:        TAE ARCINIEGA      Sonographer:               Nova Orr RVT, DERECK      Study Type:                Complete Unilateral      Technical " "Quality:         Adequate      Indications:     Swelling of Limb, Pain in Limb      History:         Acute left shoulder pain and swelling      Limitations:      PROCEDURES:   Left upper extremity venous duplex imaging.    The following venous structures were evaluated: internal jugular,    subclavian, axillary, brachial, cephalic, and basilic veins.    Serial compression, color, and spectral Doppler flow evaluations were    performed.          FINDINGS:   Left upper extremity.    All veins demonstrate complete color filling and compressibility with    normal venous flow dynamics including spontaneous flow and respiratory    phasicity.    No deep venous thrombosis.       Flow was evaluated in the contralateral subclavian vein and normal venous    flow dynamics including spontaneous flow and respiratory phasic variation    were demonstrated.       Ameya Ennis MD   (Electronically Signed)   Final Date:      13 February 2023                     14:52    Physical Exam:    Constitutional: Uncomfortable, fighting back tears.   HENT: Normocephalic and atraumatic. Alopecia.  No rhinorrhea. Oropharynx is clear and moist.   Eyes: Conjunctivae are normal. No scleral icterus.   Neck: Supple, no adenopathy.    Cardiovascular: tachycardic w/o murmur.  Pulmonary/Chest: Effort normal. Symmetric expansion.  Clear to auscultation bilaterally.  Abdomen: Soft. Bowel sounds are normal. No distension, organomegaly or mass.     Genitourinary:  Deferred  Musculoskeletal: Tense swelling from left shoulder to mid-forearm; hand is neurovascularly intact; point tenderness over distal anterior clavicle.  Skin: Skin is warm, dry and pink.  No rash or evidence of skin infection. Port site clean and dry, accessed.       ASSESSMENT AND PLAN:     Tomas \"JULY\" Estiven  is a 22 y.o. male who presented to the [facility] for Day 50 chemotherapy with Vincristine on 2/7/2023. During that " appointment he was noted to be febrile with chills and dehydrated. Hence, decision made to admit him to pediatric ott for management of febrile neutropenia and dehydration.     Counts trending up. Orthopedic surgeon following patient; overnight, there has been fairly dramatic swelling of the left upper extremity with significantly increased pain.  JULY continues to be afebrile.  Patient's nausea resolved. No emesis. Hypoxia when deep sleep, likely also due to atelectasis. On 0.5 L oxygen via NC.     Febrile Neutropenia in an immunocompromised host: Etiology not clear: Counts trending up which is encouraging. Afebrile.   - Previous history of gram negative septic shock  - Blood culture from port 2/7/2023: NGTD  - Repeat blood culture from port 2/9/2023: NGTD  - IV Cefepime x 1 in CIS, continue every 8 hrs  - IV Vancomycin started on 2/10/2023, 1250 mg every 12 hrs     Left shoulder pain: Myositis/intermuscular edema/fluid  - Edema and pain are more extensive since yesterday  - X-ray L shoulder 2/8/2023: not concerning  - MRI shoulder 2/10/2023: showing diffuse myositis and some intermuscular edema/fluid  - No evidence of DVT  - This may simply reflect recovery of neutrophils BUT etiology of inflammation is unclear  - JULY is now receptive to PCA Dilaudid  - Encourage rest, warm packs  - Orthopedic surgeon consulted: Appreciate recs  - Added IV Vancomycin on 2/10/2023     Tachycardia: orthostatic vs cardiac issues  - Some PVCs noted on tele but hemodynamically stable  - Recently admitted with severe septic shock, hence consider cardiomyopathy  - Consulted cardiology (Dr Galvez): recommended Holter monitor and repeat echo     Diarrhea: Likely from cytarabine vs enteritis: Resolved, formed stool  - C.diff toxin assay : negative PCR ([id number]-[id number] negative)   - Stool culture: NGTD  - Will continue to monitor     Constipation secondary to opioid use:  - Start senna-docusate BID  - encouraged ambulation as tolerated.      Retrosternal chest pain : likely from LLOYD, improved  - Continue famotidine 20 mg BID  - Continue to monitor     Hypoxemia: likely from opioids/atelectasis  - 85-86% with deep sleep  - Requiring 0.5 L oxygen at night/ even in am  - IVF at 50 ml/hr given patient on vancomycin, encourage ambulation as tolerated  - Ordered incentive spirometry  - If continues, will get CXR to r/o pleural effusion/edema, consider lasix IV x 1     Ph+ Precursor B-Cell Acute Lymphoblastic Leukemia, in MRD Remission:  - 2-3 weeks of symptoms  - Presenting WBC > 440 k/uL, hyperleukocytosis  - Start of Hydroxyurea (1500 mg PO Q8) 2320 on 5/27/2022  - discontinued after only 55 hours  - No steroid pretreatment  - 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  - Testicular status NEGATIVE     - Flow cytometry of both 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, FISH positive for BCR-ABL1 translocation  - Enrolled on [facility] [place] [id number]  - Initially enrolled on [facility] [place] [id number] - but 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     - Enrolled on [id number] and began study 6/13/2022  - Started imatinib therapy 6/3/2022  - End of Induction IA and IB MRD negative  - Imatinib dose increased to 400 mg PO AM and 250 mg PM 9/29/2022  * Note:  All imatinib doses given inpatient rounded down to 600 mg        - Imatinib held for Septic Shock 12/27/2022-1/8/2023        - Imatinib 600 mg PO daily restarted 1/8/2023 inpatient        - Imatinib 400 mg PO QAM and 250 mg PO QHS 1/14/2023-1/17/2023        - Imatinib 600 mg PO QAM 1/17/2023 - present        [place] [id number], AR Arm B, Delayed Intensification, Day 56:   ** Vincristine 2 mg IV x1 given on 2/7/2023 in CIS (COMPLETES this phase)  ** Continue imatinib 600 mg PO Q AM                     Chemotherapy-related Pancytopenia:  - Transfuse for hemoglobin less than 7 gm/dL or with symptoms  - Transfuse for platelets less than 10,000/microliters or with symptoms  - S/p pRBCs transfusion  on 2/8/2023, 2/9/2023  - S/p platelet transfusion 2/11/2023  - CBC daily      At risk for deep vein thrombosis due to pegaspargase:  - Platelets still low  - Will hold off from starting apixaban  - Will order SCD, encourage ambulation as tolerated     At Risk of Opportunistic Lung Infection:  - Bactrim DS PO BID Sat and Sun for PJP prophylaxis     Central Access:   - R Port-A-Cath in place      Research Participant:           [facility] - Source Data         Diagnosis: Ph+ Precursor B-Cell Acute Lymphoblastic Leukemia     Disease Status: EOI1A MRD NEGATIVE, EOI1B RD NEGATIVE, CNS3c, testicular negative, HSV1 IgG POSITIVE, CMV IgG NEGATIVE, VARICELLA IgG POSITIVE     Active Studies: [id number], [id number]                                                                                                      Inactive Studies: [id number]                                                                                                                                                Optional Studies: None             Protocol: International Phase 3 trial in [place] 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      Treatment Plan: [id number](OS), AR-Arm B, Delayed Intensification, Day 56     Height: 1.650 m      Weight: 64.2kg       BSA: 1.72 m²   (Delayed Intensification Day 29 1/17/2023)                                                                                                                                           Performance Status: Karnofsky 70, ECOG  2 (1/31/2023)     Treatment Plan Medications:  (100% adherent with imatinib)     Imatinib dose adjusted for weight at DI, Day 29 to Imatinib 600 mg PO daily in AM     Evaluations / Study Labs:  (2/12/2023) : None     Therapy Given: (2/12/2023):  Imatinib 600 mg PO QAM     Toxicities:  **Grade 3 febrile neutropenia on 2/7/2023 (ANC <1000/mm3 with a single  temperature of >38.3 degrees C (101 degrees F) or a sustained temperature of >=38 degrees C (100.4 degrees F) for more than one hour)  ** Grade 3 diarrhea on 2/6/2023 (Increase of >=7 stools per day  over baseline; hospitalization indicated, limiting self care ADL): Resolved 2/7/2023  ** Grade 2 diarrhea on 2/7/2023 (Increase of 4 - 6 stools per day  over baseline; limiting instrumental ADL) Resolved 2/8/2023  ** Grade 3 vomiting on 2/6/2023 and 2/7/2023 (hospitalization indicated) Resolved 2/8/2023  ** Grade 2 Myositis (Moderate pain associated with weakness; pain limiting, instrumental ADL): Present on admission on 2/7/2023  ** Grade 3 Hypoxemia (Decreased oxygen saturation at rest (e.g., pulse oximeter  <88% or PaO2 <=55 mm Hg), started 2/10/2023         Disposition: Pending resolution of fever, rising ANC, better management of myositis. Plan discussed with bedside nurse, JULY and Dr Haddad.     Discussed with nursing staff, Dr. Duenas.  Total time today approx 55 minutes.    ANN MARIE Rodriguez MD  Pediatric Hematology / Oncology  [facility]  Cell. [phone]  Office. [phone]

## 2023-02-15 ENCOUNTER — APPOINTMENT (OUTPATIENT)
Dept: RADIOLOGY | Facility: MEDICAL CENTER | Age: 23
DRG: 500 | End: 2023-02-15
Attending: PEDIATRICS
Payer: COMMERCIAL

## 2023-02-15 LAB
ANISOCYTOSIS BLD QL SMEAR: ABNORMAL
BASOPHILS # BLD AUTO: 0 % (ref 0–1.8)
BASOPHILS # BLD: 0 K/UL (ref 0–0.12)
EOSINOPHIL # BLD AUTO: 0 K/UL (ref 0–0.51)
EOSINOPHIL NFR BLD: 0 % (ref 0–6.9)
ERYTHROCYTE [DISTWIDTH] IN BLOOD BY AUTOMATED COUNT: 48.6 FL (ref 35.9–50)
HCT VFR BLD AUTO: 22 % (ref 42–52)
HGB BLD-MCNC: 7.3 G/DL (ref 14–18)
LYMPHOCYTES # BLD AUTO: 0.33 K/UL (ref 1–4.8)
LYMPHOCYTES NFR BLD: 22 % (ref 22–41)
MANUAL DIFF BLD: NORMAL
MCH RBC QN AUTO: 28 PG (ref 27–33)
MCHC RBC AUTO-ENTMCNC: 33.2 G/DL (ref 33.7–35.3)
MCV RBC AUTO: 84.3 FL (ref 81.4–97.8)
MICROCYTES BLD QL SMEAR: ABNORMAL
MONOCYTES # BLD AUTO: 0.37 K/UL (ref 0–0.85)
MONOCYTES NFR BLD AUTO: 24.6 % (ref 0–13.4)
MORPHOLOGY BLD-IMP: NORMAL
MYELOCYTES NFR BLD MANUAL: 0.9 %
NEUTROPHILS # BLD AUTO: 0.79 K/UL (ref 1.82–7.42)
NEUTROPHILS NFR BLD: 52.5 % (ref 44–72)
NRBC # BLD AUTO: 0.02 K/UL
NRBC BLD-RTO: 1.4 /100 WBC
PLATELET # BLD AUTO: 36 K/UL (ref 164–446)
PLATELET BLD QL SMEAR: NORMAL
PMV BLD AUTO: 12 FL (ref 9–12.9)
RBC # BLD AUTO: 2.61 M/UL (ref 4.7–6.1)
RBC BLD AUTO: PRESENT
VANCOMYCIN PEAK SERPL-MCNC: 37.7 UG/ML (ref 20–40)
VANCOMYCIN TROUGH SERPL-MCNC: 7 UG/ML (ref 10–20)
WBC # BLD AUTO: 1.5 K/UL (ref 4.8–10.8)

## 2023-02-15 PROCEDURE — 85007 BL SMEAR W/DIFF WBC COUNT: CPT

## 2023-02-15 PROCEDURE — 99232 SBSQ HOSP IP/OBS MODERATE 35: CPT | Performed by: PEDIATRICS

## 2023-02-15 PROCEDURE — A9270 NON-COVERED ITEM OR SERVICE: HCPCS | Performed by: PEDIATRICS

## 2023-02-15 PROCEDURE — 700111 HCHG RX REV CODE 636 W/ 250 OVERRIDE (IP): Performed by: PEDIATRICS

## 2023-02-15 PROCEDURE — 700105 HCHG RX REV CODE 258: Performed by: PEDIATRICS

## 2023-02-15 PROCEDURE — 700102 HCHG RX REV CODE 250 W/ 637 OVERRIDE(OP): Performed by: PEDIATRICS

## 2023-02-15 PROCEDURE — 770001 HCHG ROOM/CARE - MED/SURG/GYN PRIV*

## 2023-02-15 PROCEDURE — 80202 ASSAY OF VANCOMYCIN: CPT

## 2023-02-15 PROCEDURE — 700117 HCHG RX CONTRAST REV CODE 255: Performed by: PEDIATRICS

## 2023-02-15 PROCEDURE — 73201 CT UPPER EXTREMITY W/DYE: CPT | Mod: LT

## 2023-02-15 PROCEDURE — 99232 SBSQ HOSP IP/OBS MODERATE 35: CPT | Performed by: ORTHOPAEDIC SURGERY

## 2023-02-15 PROCEDURE — 85025 COMPLETE CBC W/AUTO DIFF WBC: CPT

## 2023-02-15 RX ADMIN — CHLORHEXIDINE GLUCONATE 0.12% ORAL RINSE 15 ML: 1.2 LIQUID ORAL at 21:00

## 2023-02-15 RX ADMIN — VANCOMYCIN HYDROCHLORIDE 1000 MG: 500 INJECTION, POWDER, LYOPHILIZED, FOR SOLUTION INTRAVENOUS at 02:19

## 2023-02-15 RX ADMIN — ACETAMINOPHEN 650 MG: 325 TABLET, FILM COATED ORAL at 06:19

## 2023-02-15 RX ADMIN — ONDANSETRON 8 MG: 2 INJECTION INTRAMUSCULAR; INTRAVENOUS at 08:40

## 2023-02-15 RX ADMIN — FAMOTIDINE 20 MG: 20 TABLET ORAL at 17:52

## 2023-02-15 RX ADMIN — CHLORHEXIDINE GLUCONATE 0.12% ORAL RINSE 15 ML: 1.2 LIQUID ORAL at 12:24

## 2023-02-15 RX ADMIN — ONDANSETRON 8 MG: 2 INJECTION INTRAMUSCULAR; INTRAVENOUS at 16:06

## 2023-02-15 RX ADMIN — ACETAMINOPHEN 650 MG: 325 TABLET, FILM COATED ORAL at 17:52

## 2023-02-15 RX ADMIN — DEXTROSE AND SODIUM CHLORIDE: 5; 450 INJECTION, SOLUTION INTRAVENOUS at 04:58

## 2023-02-15 RX ADMIN — ACETAMINOPHEN 650 MG: 325 TABLET, FILM COATED ORAL at 12:24

## 2023-02-15 RX ADMIN — Medication 1000 UNITS: at 06:19

## 2023-02-15 RX ADMIN — IOHEXOL 100 ML: 350 INJECTION, SOLUTION INTRAVENOUS at 20:50

## 2023-02-15 RX ADMIN — SENNOSIDES AND DOCUSATE SODIUM 1 TABLET: 50; 8.6 TABLET ORAL at 06:20

## 2023-02-15 RX ADMIN — Medication: at 13:30

## 2023-02-15 RX ADMIN — IMATINIB MESYLATE 200 MG: 100 TABLET, FILM COATED ORAL at 06:20

## 2023-02-15 RX ADMIN — CEFEPIME 2 G: 2 INJECTION, POWDER, FOR SOLUTION INTRAVENOUS at 16:08

## 2023-02-15 RX ADMIN — CEFEPIME 2 G: 2 INJECTION, POWDER, FOR SOLUTION INTRAVENOUS at 08:41

## 2023-02-15 RX ADMIN — FAMOTIDINE 20 MG: 20 TABLET ORAL at 06:19

## 2023-02-15 RX ADMIN — CHLORHEXIDINE GLUCONATE 0.12% ORAL RINSE 15 ML: 1.2 LIQUID ORAL at 08:50

## 2023-02-15 RX ADMIN — VANCOMYCIN HYDROCHLORIDE 1000 MG: 500 INJECTION, POWDER, LYOPHILIZED, FOR SOLUTION INTRAVENOUS at 18:03

## 2023-02-15 RX ADMIN — IMATINIB MESYLATE 400 MG: 400 TABLET, FILM COATED ORAL at 06:20

## 2023-02-15 RX ADMIN — CHLORHEXIDINE GLUCONATE 0.12% ORAL RINSE 15 ML: 1.2 LIQUID ORAL at 16:06

## 2023-02-15 RX ADMIN — VANCOMYCIN HYDROCHLORIDE 1000 MG: 500 INJECTION, POWDER, LYOPHILIZED, FOR SOLUTION INTRAVENOUS at 10:28

## 2023-02-15 ASSESSMENT — PAIN DESCRIPTION - PAIN TYPE
TYPE: ACUTE PAIN

## 2023-02-15 NOTE — PROGRESS NOTES
Pediatric Orthopedic Consult Note:    Luke Haddad M.D.  Date & Time note created:    2/12/2023  10:00 a.m.     Referring MD:  Dr. Alyce Duenas    Patient ID:  Name:             Tomas Jean-Baptiste   YOB: 2000  Age:                 22 y.o.  male   MRN:               [id number]                                                     Chief complaint: Left shoulder pain    History of present illness:  Tomas Jean-Baptiste 22 y.o. male who was admitted due to neutropenic fever from treatment of his leukemia. He started having left shoulder pain about 1 weeks ago. He feels that today may be a little worse, requiring pain medication to reduce the pain.    Interval History (2/11/2023):  JULY was examined this morning. His pain profile is relatively unchanged from yesterday. The pain medication helps quite a bit. Still has pain in anterior left shoulder.    Interval History (2/12/2023):  JULY was re-examined this morning. His pain profile is still unchanged from prior. The pain medication continues to help out quite a bit. Still has pain in anterior left shoulder. His labs have improved as has his fever curve.    Interval History (2/13/2023):  JULY was re-examined today. His pain profile is slightly worsened than prior days. The pain medication continues to help, but his left anterior shoulder pain persists. His labs have improved as has his fever curve, but his arm has become significantly more swollen overnight.    Interval History (2/14/2023):  JULY was re-examined today. His right shoulder, upper arm, and elbow have become more swollen with pain controlled on dilaudid. His labs continue to improve and he has remained afebrile.    Past medical history:   Past Medical History:   Diagnosis Date    Cancer (HCC)     Leukoma        Past surgical history:   Past Surgical History:   Procedure Laterality Date    CATH PLACEMENT Right 8/29/2022    Procedure: INSERTION, CATHETER;  Surgeon: Simona Moise,  M.D.;  Location: SURGERY [facility];  Service: Ent       Family history:   Family History   Problem Relation Age of Onset    No Known Problems Mother     Stroke Maternal Grandmother     Diabetes Paternal Grandfather     Hypertension Paternal Grandfather     Hyperlipidemia Paternal Grandfather     Cancer Neg Hx     Heart Disease Neg Hx        Social history:   Social History     Socioeconomic History    Marital status: Single     Spouse name: Not on file    Number of children: Not on file    Years of education: Not on file    Highest education level: Not on file   Occupational History    Not on file   Tobacco Use    Smoking status: Never    Smokeless tobacco: Never   Vaping Use    Vaping Use: Never used   Substance and Sexual Activity    Alcohol use: Never    Drug use: Never    Sexual activity: Not Currently   Other Topics Concern    Behavioral problems Not Asked    Interpersonal relationships Not Asked    Sad or not enjoying activities Not Asked    Suicidal thoughts Not Asked    Poor school performance Not Asked    Reading difficulties Not Asked    Speech difficulties Not Asked    Writing difficulties Not Asked    Inadequate sleep Not Asked    Excessive TV viewing Not Asked    Excessive video game use Not Asked    Inadequate exercise Not Asked    Sports related Not Asked    Poor diet Not Asked    Family concerns for drug/alcohol abuse Not Asked    Poor oral hygiene Not Asked    Bike safety Not Asked    Family concerns vehicle safety Not Asked   Social History Narrative    Not on file     Social Determinants of Health     Financial Resource Strain: Not on file   Food Insecurity: Not on file   Transportation Needs: Not on file   Physical Activity: Not on file   Stress: Not on file   Social Connections: Not on file   Intimate Partner Violence: Not on file   Housing Stability: Not on file       Current medications:   Current Facility-Administered Medications   Medication Dose    vancomycin (VANCOCIN) 1,000 mg in   mL IVPB  1,000 mg    Pharmacy Consult Request ...Pain Management Review 1 Each  1 Each    acetaminophen (Tylenol) tablet 650 mg  650 mg    Followed by    [START ON 2/18/2023] acetaminophen (Tylenol) tablet 650 mg  650 mg    HYDROmorphone (DILAUDID) 0.2 mg/mL in 50 mL NS (PCA)      senna-docusate (PERICOLACE or SENOKOT S) 8.6-50 MG per tablet 1 Tablet  1 Tablet    lidocaine (LIDODERM) 5 % 1 Patch  1 Patch    MD Alert...Vancomycin per Pharmacy      LORazepam (ATIVAN) injection 0.5 mg  0.5 mg    lidocaine-prilocaine (EMLA) 2.5-2.5 % cream      D5 1/2 NS infusion      cefepime (Maxipime) 2 g in  mL IVPB  2 g    ondansetron (ZOFRAN) syringe/vial injection 8 mg  8 mg    famotidine (PEPCID) tablet 20 mg  20 mg    chlorhexidine (PERIDEX) 0.12 % solution 15 mL  15 mL    imatinib (Gleevec) tablet 200 mg  200 mg    imatinib (Gleevec) tablet 400 mg  400 mg    therapeutic multivitamin-minerals (THERAGRAN-M) tablet 1 Tablet  1 Tablet    vitamin D3 (cholecalciferol) tablet 1,000 Units  1,000 Units       Allergies:  Allergies   Allergen Reactions    Amoxicillin Rash     Reacted as an infant. No swelling or airway problems.  Tolerated cefepime June 2022       Immunizations:  Immunization History   Administered Date(s) Administered    Dtap Vaccine 2000, 01/29/2002, 06/24/2002, 01/15/2003, 01/23/2006    HIB Vaccine (ACTHIB/HIBERIX) 2000, 01/29/2002, 09/25/2002    HPV Quadrivalent Vaccine (GARDASIL) - HISTORICAL DATA 12/15/2014, 02/17/2015, 06/03/2015    Hepatitis A Vaccine, Ped/Adol 10/03/2003, 01/23/2006    Hepatitis B Vaccine Adolescent/Pediatric 2000, 01/29/2002, 09/25/2002    IPV 2000, 01/29/2002, 06/24/2002, 01/23/2006    Influenza (IM) Preservative Free - HISTORICAL DATA 12/09/2011, 10/17/2013, 09/18/2015, 10/05/2016    Influenza Seasonal Injectable - Historical Data 10/12/2012    Influenza Vac Subunit Quad Inj (Pf) 11/08/2017    Influenza Vaccine Quad Inj (Pf) 11/17/2014, 09/24/2018, 09/27/2019     MMR Vaccine 09/25/2002, 01/15/2003    Meningococcal Conjugate Vaccine MCV4 (MENVEO) 09/24/2018    Meningococcal Conjugate Vaccine MCV4 (Menactra) 12/11/2012    Pneumococcal Vaccine (PCV7) - HISTORICAL DATA 2000, 03/08/2002, 06/24/2002    Tdap Vaccine 10/12/2012    Varicella Vaccine Live 09/25/2002, 01/17/2007       Review of systems:   Constitutional: Fatigued, fevcers,  HENT: Normal breathing, but alopecia  Eyes: Negative for photophobia, discharge and redness.   Respiratory: Negative for cough, wheezing and stridor.    Cardiovascular: Negative for leg swelling.   Gastrointestinal: Negative for constipation, diarrhea, nausea and vomiting.   Genitourinary: No renal disease or abnormalities   Musculoskeletal: Negative for back pain and neck pain, but (+) for left shoulder pain  Skin: Negative for rash.   Neurological: Negative for tremors, sensory change, speech change, focal weakness, seizures, loss of consciousness and weakness.   Endo/Heme/Allergies: thrombocytopenia (+), neutropenia (+)      Physical examination:   Vitals:    02/14/23 1205 02/14/23 1240 02/14/23 1318 02/14/23 1351   BP: 125/73      Pulse: (!) 111 (!) 108 (!) 130 (!) 136   Resp:    19   Temp: 37.1 °C (98.8 °F)   37.9 °C (100.3 °F)   TempSrc: Temporal   Oral   SpO2: 95% 98% 98% 97%   Weight:       Height:         Physical Exam    Weak-appearing, but appropriate & conversive    Left upper extremity:   Left wrist, fingers, elbow, & forearm full ROM, TTP (-)   Increased swelling of left arm from forearm to shoulder (+)   Left shoulder painful with AROM, TTP (+) anteriorly, full, PROM with mild pain   Neuro intract (+)   Skin warm to touch, but febrile & equal to other limbs    ANCILLARY DATA REVIEWED:     Lab Data Review:  Recent Labs     02/12/23  0323 02/13/23  0150 02/14/23  0413   WBC 0.4* 0.6* 1.2*   RBC 2.76* 2.68* 2.79*   HEMOGLOBIN 7.9* 7.7* 7.8*   HEMATOCRIT 23.6* 23.0* 23.7*   MCV 85.5 85.8 84.9   MCH 28.6 28.7 28.0   MCHC 33.5* 33.5*  32.9*   RDW 49.2 49.7 50.9*   PLATELETCT 42* 38* 45*   MPV 11.5 10.5 12.0       Recent Labs     02/12/23  0323   SODIUM 135   POTASSIUM 3.5*   CHLORIDE 102   CO2 25   GLUCOSE 126*   BUN 6*   CREATININE 0.41*   CALCIUM 7.5*             MICRO:  Blood cultures from 2/9/2023 - no growth     Imaging  XR's left shoulder - negative for fracture or acute injury    MRI left shoulder - inflammation in muscles / myositis without focal collection or abscess; mild amount of joint fluid with fluid surrounding the biceps tendon; osseous changes, likely AVN vs. Myelodysplasia-related, no evidence of osteomyelitis or intra-osseous abscess    U/S left upper extremity - no evidence of DVT or fluid collection    ASSESSMENT AND PLAN:  Left shoulder myositis, possible biceps tendon involvement, possible developing abscess  Continue current antibiotics  Recommend ice pack to shoulder and encourage ROM of LUE  Discussed with Dr. Rodriguez whether this was a symptoms of return of neutrophils or myositis alone  At this point, no surgical intervention  Will continue to follow    Luke Haddad M.D.  Pediatric Orthopedics and Scoliosis  [facility]

## 2023-02-15 NOTE — PROGRESS NOTES
"Pharmacy Vancomycin Kinetics Note for 2/15/2023     22 y.o. male on Vancomycin day # 6     Vancomycin Indication (Two level/Trough based Dosing): Skin/skin structure infection (goal trough 10-15)    Provider specified end date: 02/22/23    Active Antibiotics (From admission, onward)      Ordered     Ordering Provider       Mon Feb 13, 2023  2:18 PM    02/13/23 1418  vancomycin (VANCOCIN) 1,000 mg in  mL IVPB  (vancomycin (VANCOCIN) IV (LD + Maintenance))  EVERY 8 HOURS        Note to Pharmacy: Per P&T vanco per pharmacy Protocol    River Rodriguez M.D.       Fri Feb 10, 2023  6:38 PM    02/10/23 1838  MD Alert...Vancomycin per Pharmacy  PHARMACY TO DOSE        Question:  Indication(s) for vancomycin?  Answer:  Skin and soft tissue infection    River Rodriguez M.D.       Tue Feb 7, 2023 12:35 PM    02/07/23 1235  cefepime (Maxipime) 2 g in  mL IVPB  EVERY 8 HOURS         River Rodriguez M.D.            Dosing Weight: 62 kg (136 lb 11 oz)      Admission History: Admitted on 2/7/2023 for ALL (acute lymphoid leukemia) in remission ([facility]) [C91.01]  Pertinent history: Pt with ALL presented with concerns for left shoulder myositis, possible biceps tendon involvment. Pt is also neturopenic. Pt has been receiving cefepime and vanco therapy added per orthopedic recommendations.    Allergies:     Amoxicillin     Pertinent cultures to date:     Results       Procedure Component Value Units Date/Time    Blood Culture [[id number]] Collected: 02/09/23 1254    Order Status: Completed Specimen: Blood from Line Updated: 02/14/23 1500     Significant Indicator NEG     Source BLD     Site LINE     Culture Result No growth after 5 days of incubation.    Narrative:      Protective  Per Hospital Policy: Only change Specimen Src: to \"Line\" if  specified by physician order.  Chest Port    MRSA By PCR (Amp) [[id number]] Collected: 02/11/23 1330    Order Status: Completed Specimen: Respirate from Nares Updated: " "23     MRSA by PCR Negative    Narrative:      Protective  Collected By: [id number] SONIA JESUS    Blood Culture [[id number]]     Order Status: Canceled Specimen: Blood from Line             Labs:     Estimated Creatinine Clearance: 245.1 mL/min (A) (by C-G formula based on SCr of 0.41 mg/dL (L)).  Recent Labs     23  0150 23  0413 02/15/23  0505   WBC 0.6* 1.2* 1.5*   NEUTSPOLYS 67.50 69.90 52.50   BANDSSTABS 5.20  --   --      No results for input(s): BUN, CREATININE, ALBUMIN in the last 72 hours.    Intake/Output Summary (Last 24 hours) at 2/15/2023 1421  Last data filed at 2/15/2023 0900  Gross per 24 hour   Intake 2302.13 ml   Output 2355 ml   Net -52.87 ml      /66   Pulse (!) 110   Temp 36.7 °C (98.1 °F) (Temporal)   Resp 18   Ht 1.651 m (5' 5\")   Wt 60.8 kg (134 lb 0.6 oz)   SpO2 95%  Temp (24hrs), Av.2 °C (98.9 °F), Min:36.6 °C (97.9 °F), Max:37.9 °C (100.2 °F)      List concerns for Vancomycin clearance:     None    Pharmacokinetics:     Two level kinetics:   Ke (hr ^-1): 0.3742  Half life: 1.9  Vd: Steady state Vd : 14.755  Calculated AUC: 527 mg·hr/L    Trough kinetics:   Recent Labs     02/15/23  0505 02/15/23  0935   VANCOTROUGH  --  7.0*   VANCOPEAK 37.7  --        A/P:     -  Vancomycin dose: continue with vancomycin 1000 mg IV q8h    -  Next vancomycin level(s):    - 5 to 7 days or sooner if clinically indicated    -  Predicted vancomycin AUC from two level test calculator: 527 mg·hr/L    -  Comments: AUC calculation within goal range. UOP remains stable with no major concerns for accumulation at this time. Will continue with current dosing and monitor for de-escalation when appropriate.    Thank you!      Funmi Vance, PharmD, BCPS    "

## 2023-02-15 NOTE — CARE PLAN
The patient is Watcher - Medium risk of patient condition declining or worsening    Shift Goals  Clinical Goals: VSS, stable on 2L O2, good pain control  Patient Goals: get good rest and get pain under control  Family Goals: felicita, not at bedside    Progress made toward(s) clinical / shift goals:  yes      Problem: Pain - Standard  Goal: Alleviation of pain or a reduction in pain to the patient’s comfort goal  Outcome: Progressing  Note: Pt reports that pain is well controlled on dilaudid pca.        Patient is not progressing towards the following goals:      Problem: Respiratory  Goal: Patient will achieve/maintain optimum respiratory ventilation and gas exchange  Outcome: Not Progressing  Note: Pt had intermittent desats on 2L nasal cannula with activity when getting up to use the bathroom.

## 2023-02-15 NOTE — PROGRESS NOTES
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  Left arm remains swollen, arguably a bit worse since yesterday.  Temp this afternoon to 100.3.  Increased oxygen requirement (NC at 2 LPM).    Review of Systems:     Constitutional: Poor appetite.  HENT: Negative for ear pain, nosebleeds, congestion, rhinorrhea, mouth sores.  Eyes: Negative for visual changes.  Respiratory: Negative for shortness of breath or cough.   Gastrointestinal: Negative for nausea, vomiting, abdominal pain; no BM x 2 days..    Skin: Negative for rash or skin infection..  Neurological: Negative for numbness, tingling, sensory changes, weakness or headaches.     Psychiatric/Behavioral: \"Tired\" but much more comfortable compared to yesterday.     Medications:    Current Facility-Administered Medications:     heparin lock flush 100 unit/mL injection 300-500 Units, 300-500 Units, Intracatheter, PRN, River Rodriguez M.D., 500 Units at 23 1648    vancomycin " (VANCOCIN) 1,000 mg in  mL IVPB, 1,000 mg, Intravenous, Q8HR, River Rodriguez M.D., Stopped at 02/14/23 1204    Pharmacy Consult Request ...Pain Management Review 1 Each, 1 Each, Other, PHARMACY TO DOSE, River Rodriguez M.D.    acetaminophen (Tylenol) tablet 650 mg, 650 mg, Oral, Q6HRS, 650 mg at 02/14/23 1202 **FOLLOWED BY** [START ON 2/18/2023] acetaminophen (Tylenol) tablet 650 mg, 650 mg, Oral, Q6HRS PRN, River Rodriguez M.D.    HYDROmorphone (DILAUDID) 0.2 mg/mL in 50 mL NS (PCA), , Intravenous, Continuous, River Rodriguez M.D., New Bag at 02/14/23 1552    senna-docusate (PERICOLACE or SENOKOT S) 8.6-50 MG per tablet 1 Tablet, 1 Tablet, Oral, DAILY, Alyce Duenas M.D., 1 Tablet at 02/13/23 0636    lidocaine (LIDODERM) 5 % 1 Patch, 1 Patch, Transdermal, Q24HRS PRN, Alyce Duenas M.D.    MD Alert...Vancomycin per Pharmacy, , Other, PHARMACY TO DOSE, Alyce Duenas M.D.    LORazepam (ATIVAN) injection 0.5 mg, 0.5 mg, Intravenous, Q6HRS PRN, Alyce Duenas M.D.    lidocaine-prilocaine (EMLA) 2.5-2.5 % cream, , Topical, PRN, Alyce Duenas M.D.    D5 1/2 NS infusion, , Intravenous, Continuous, Alyce Duenas M.D., Last Rate: 75 mL/hr at 02/14/23 0817, New Bag at 02/14/23 0817    cefepime (Maxipime) 2 g in  mL IVPB, 2 g, Intravenous, Q8HRS, Alyce Duenas M.D., Stopped at 02/14/23 1617    ondansetron (ZOFRAN) syringe/vial injection 8 mg, 8 mg, Intravenous, Q8HRS, Alyce Duenas M.D., 8 mg at 02/14/23 1547    famotidine (PEPCID) tablet 20 mg, 20 mg, Oral, BID, Alyce Duenas M.D., 20 mg at 02/14/23 1547    chlorhexidine (PERIDEX) 0.12 % solution 15 mL, 15 mL, Mouth/Throat, 4X/DAY, Alyce Duenas M.D., 15 mL at 02/14/23 0817    imatinib (Gleevec) tablet 200 mg, 200 mg, Oral, DAILY, Alyce Duenas M.D., 200 mg at 02/14/23 0555    imatinib (Gleevec) tablet 400 mg, 400 mg, Oral, DAILY, Alyce Duenas M.D., 400 mg at 02/14/23 0554    therapeutic multivitamin-minerals (THERAGRAN-M) tablet 1 Tablet, 1  "Tablet, Oral, DAILY, Alyce Duenas M.D., 1 Tablet at 23 0631    vitamin D3 (cholecalciferol) tablet 1,000 Units, 1,000 Units, Oral, DAILY, Alyce Duenas M.D., 1,000 Units at 23 0557    OBJECTIVE:     Max Temp: Temp (24hrs), Av.2 °C (99 °F), Min:36.6 °C (97.8 °F), Max:37.9 °C (100.3 °F)      Vitals: BP (P) 110/63   Pulse (!) (P) 124   Temp (P) 37.9 °C (100.2 °F) (Oral)   Resp (P) 16   Ht 1.651 m (5' 5\")   Wt 60.8 kg (134 lb 0.6 oz)   SpO2 (P) 98%   BMI 22.31 kg/m²       Intake/Output Summary (Last 24 hours) at 2023 1745  Last data filed at 2023 1617  Gross per 24 hour   Intake 4896.37 ml   Output 1805 ml   Net 3091.37 ml       Today's weight is 60.8 kg, down 1.2 kg from a week ago.    Labs:   Latest Reference Range & Units 23 01:50 23 04:13   WBC 4.8 - 10.8 K/uL 0.6 (LL) 1.2 (LL)   RBC 4.70 - 6.10 M/uL 2.68 (L) 2.79 (L)   Hemoglobin 14.0 - 18.0 g/dL 7.7 (L) 7.8 (L)   Hematocrit 42.0 - 52.0 % 23.0 (L) 23.7 (L)   MCV 81.4 - 97.8 fL 85.8 84.9   MCH 27.0 - 33.0 pg 28.7 28.0   MCHC 33.7 - 35.3 g/dL 33.5 (L) 32.9 (L)   RDW 35.9 - 50.0 fL 49.7 50.9 (H)   Platelet Count 164 - 446 K/uL 38 (L) 45 (L)   MPV 9.0 - 12.9 fL 10.5 12.0   Neutrophils-Polys 44.00 - 72.00 % 67.50 69.90   Neutrophils (Absolute) 1.82 - 7.42 K/uL 0.44 (LL) 0.84 (L)   Bands-Stabs 0.00 - 10.00 % 5.20    Lymphocytes 22.00 - 41.00 % 13.00 (L) 13.30 (L)   Lymphs (Absolute) 1.00 - 4.80 K/uL 0.08 (L) 0.16 (L)   Monocytes 0.00 - 13.40 % 14.30 (H) 16.80 (H)       Physical Exam:    Constitutional: Sleepy but conversational, NAD   HENT: Normocephalic and atraumatic. Alopecia.  No rhinorrhea. Oropharynx is clear and moist.   Eyes: Conjunctivae are normal. No scleral icterus.   Neck: Supple, no adenopathy.    Cardiovascular: RRR with 3/6 pulmonary flow murmur.  Pulmonary/Chest: Effort normal. Symmetric expansion.  Clear to auscultation bilaterally.  Abdomen: Soft. Bowel sounds are normal. No distension, organomegaly or mass.  " "   Genitourinary:  Deferred  Musculoskeletal: Marked edema from left shoulder to left forearm; left hand neurovascularly intact.  Skin: No rash or evidence of skin infection. Port site clean and dry, accessed.     ASSESSMENT AND PLAN:     Tomas \"JULY\" Estiven  is a 22 y.o. male who presented to the [facility] for Day 50 chemotherapy with Vincristine on 2/7/2023. During that appointment he was noted to be febrile with chills and dehydrated. Hence, decision made to admit him to pediatric ott for management of febrile neutropenia and dehydration.     Counts trending up. Orthopedic surgeon following patient; overnight, there has been fairly dramatic swelling of the left upper extremity with significantly increased pain.  JULY continues to be afebrile.  Patient's nausea resolved. No emesis. Hypoxia when deep sleep, likely also due to atelectasis. On 0.5 L oxygen via NC.     Febrile Neutropenia in an immunocompromised host: Etiology not clear: Counts trending up which is encouraging. Afebrile.   - Previous history of gram negative septic shock  - Blood culture from port 2/7/2023: NGTD  - Repeat blood culture from port 2/9/2023: NGTD  - IV Cefepime x 1 in CIS, continue every 8 hrs  - IV Vancomycin started on 2/10/2023, 1250 mg every 12 hrs     Left shoulder pain: Myositis/intermuscular edema/fluid  - Edema and pain are more extensive since yesterday  - X-ray L shoulder 2/8/2023: not concerning  - MRI shoulder 2/10/2023: showing diffuse myositis and some intermuscular edema/fluid  - No evidence of DVT  - This may simply reflect recovery of neutrophils BUT etiology of inflammation is unclear  - JULY is now receptive to PCA Dilaudid  - Encourage rest, warm packs  - Orthopedic surgeon consulted: Appreciate recs  - Added IV Vancomycin on 2/10/2023  - Consider ID consult tomorrow if not clearly better     Tachycardia: orthostatic vs cardiac issues  - Some PVCs noted on tele " but hemodynamically stable  - Recently admitted with severe septic shock, hence consider cardiomyopathy  - Consulted cardiology (Dr Galvez): Holter monitor and repeat echo essentially unremarkable     Constipation secondary to opioid use:  - Start senna-docusate BID  - Encouraged ambulation as tolerated.     Retrosternal chest pain : likely from LLOYD, improved  - Continue famotidine 20 mg BID  - Continue to monitor     Hypoxemia: likely from opioids/atelectasis  - 85-86% with deep sleep  - Requiring 0.5 L oxygen at night/ even in am  - IVF at 50 ml/hr given patient on vancomycin, encourage ambulation as tolerated  - Ordered incentive spirometry  - Increased oxygen requirement today likely due to neutrophil recovery?  - If continues, will get CXR to r/o pleural effusion/edema  - Consider lasix IV x 1 (but weight is DOWN from last week)     Ph+ Precursor B-Cell Acute Lymphoblastic Leukemia, in MRD Remission:  - 2-3 weeks of symptoms  - Presenting WBC > 440 k/uL, hyperleukocytosis  - Start of Hydroxyurea (1500 mg PO Q8) 2320 on 5/27/2022  - discontinued after only 55 hours  - No steroid pretreatment  - 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  - Testicular status NEGATIVE     - Flow cytometry of both 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, FISH positive for BCR-ABL1 translocation  - Enrolled on [facility] [id number]  - Initially enrolled on [facility] [id number] - but 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     - Enrolled on [facility] [id number] and began study 6/13/2022  - Started imatinib therapy 6/3/2022  - End of Induction IA and IB MRD negative  - Imatinib dose increased to 400 mg PO AM and 250 mg PM 9/29/2022  * Note:  All imatinib doses given inpatient rounded down to 600 mg        - Imatinib held for Septic Shock 12/27/2022-1/8/2023        - Imatinib 600 mg PO daily restarted 1/8/2023 inpatient        - Imatinib 400 mg PO QAM and 250 mg PO QHS 1/14/2023-1/17/2023        - Imatinib 600  "mg PO QAM 1/17/2023 - present     [id number], AR Arm B, Delayed Intensification: COMPLETE   ** \"Interim Maintenance\" ON HOLD pending improvement  ** Continue imatinib 600 mg PO daily                     Chemotherapy-related Pancytopenia:  - Transfuse for hemoglobin less than 7 gm/dL or with symptoms  - Transfuse for platelets less than 10,000/microliters or with symptoms  - S/p pRBCs transfusion on 2/8/2023, 2/9/2023  - S/p platelet transfusion 2/11/2023  - CBC daily: ANC and platelets slowly upward     At risk for deep vein thrombosis due to pegaspargase:  - Platelets still low  - Will hold apixaban  - Ordered SCD, encourage ambulation as tolerated     At Risk of Opportunistic Lung Infection:  - Bactrim DS PO BID Sat and Sun for PJP prophylaxis     Central Access:   - R Port-A-Cath in place (de-access and re-access today)      Research Participant:           [employer] [facility] - Source Data         Diagnosis: Ph+ Precursor B-Cell Acute Lymphoblastic Leukemia     Disease Status: EOI1A MRD NEGATIVE, EOI1B RD NEGATIVE, CNS3c, testicular negative, HSV1 IgG POSITIVE, CMV IgG NEGATIVE, VARICELLA IgG POSITIVE     Active Studies: [id number], [id number]                                                                                                      Inactive Studies: [id number]                                                                                                                                                Optional Studies: None             Protocol: International Phase 3 trial in [place] 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      Treatment Plan: [id number](OS), AR-Arm B, Delayed Intensification, Day 57     Height: 1.650 m      Weight: 64.2kg       BSA: 1.72 m²   (Delayed Intensification Day 29 1/17/2023)                                                                                                     "                                       Performance Status: Karnofsky 70, ECOG  2 (1/31/2023)     Treatment Plan Medications:  (100% adherent with imatinib)     Imatinib dose adjusted for weight at DI, Day 29 to Imatinib 600 mg PO daily in AM     Evaluations / Study Labs:  (2/12/2023) : None     Therapy Given: (2/12/2023):  Imatinib 600 mg PO QAM     Toxicities:  **Grade 3 febrile neutropenia on 2/7/2023 (ANC <1000/mm3 with a single temperature of >38.3 degrees C (101 degrees F) or a sustained temperature of >=38 degrees C (100.4 degrees F) for more than one hour)  ** Grade 3 diarrhea on 2/6/2023 (Increase of >=7 stools per day  over baseline; hospitalization indicated, limiting self care ADL): Resolved 2/7/2023  ** Grade 2 diarrhea on 2/7/2023 (Increase of 4 - 6 stools per day  over baseline; limiting instrumental ADL) Resolved 2/8/2023  ** Grade 3 vomiting on 2/6/2023 and 2/7/2023 (hospitalization indicated) Resolved 2/8/2023  ** Grade 2 Myositis (Moderate pain associated with weakness; pain limiting, instrumental ADL): Present on admission on 2/7/2023  ** Grade 3 Hypoxemia (Decreased oxygen saturation at rest (e.g., pulse oximeter  <88% or PaO2 <=55 mm Hg), started 2/10/2023         Disposition: Pending resolution of fever, rising ANC, better management of myositis. Plan discussed with bedside nurse, JULY and Dr Haddad.      Discussed with nursing staff, Dr. Haddad, Dr Faye.  Total time today approx 45 minutes.    ANN MARIE Rodriguez MD  Pediatric Hematology / Oncology  [facility]  Cell. [phone]  Office. [phone]     Detail Level: Zone Other (Free Text): The patient was charged $25 for cosmetic cryotherapy (skin tag removal included) Note Text (......Xxx Chief Complaint.): This diagnosis correlates with the Render Risk Assessment In Note?: no Other (Free Text): The patient was charged $25 for cosmetic cryotherapy (SKs included)

## 2023-02-15 NOTE — PROGRESS NOTES
Known pt to this child life specialist. Pt sleeping at the time. Provided [employer] Decorations.  Will continue to provide support and follow.

## 2023-02-16 ENCOUNTER — APPOINTMENT (OUTPATIENT)
Dept: RADIATION ONCOLOGY | Facility: MEDICAL CENTER | Age: 23
End: 2023-02-16
Attending: RADIOLOGY
Payer: COMMERCIAL

## 2023-02-16 LAB
ABO GROUP BLD: NORMAL
BARCODED ABORH UBTYP: 5100
BARCODED PRD CODE UBPRD: NORMAL
BARCODED UNIT NUM UBUNT: NORMAL
BASOPHILS # BLD AUTO: 0 % (ref 0–1.8)
BASOPHILS # BLD: 0 K/UL (ref 0–0.12)
BLD GP AB SCN SERPL QL: NORMAL
COMPONENT R 8504R: NORMAL
EOSINOPHIL # BLD AUTO: 0 K/UL (ref 0–0.51)
EOSINOPHIL NFR BLD: 0 % (ref 0–6.9)
ERYTHROCYTE [DISTWIDTH] IN BLOOD BY AUTOMATED COUNT: 51.7 FL (ref 35.9–50)
HCT VFR BLD AUTO: 22.6 % (ref 42–52)
HGB BLD-MCNC: 7.3 G/DL (ref 14–18)
LYMPHOCYTES # BLD AUTO: 0.38 K/UL (ref 1–4.8)
LYMPHOCYTES NFR BLD: 22.2 % (ref 22–41)
MANUAL DIFF BLD: NORMAL
MCH RBC QN AUTO: 28.3 PG (ref 27–33)
MCHC RBC AUTO-ENTMCNC: 32.3 G/DL (ref 33.7–35.3)
MCV RBC AUTO: 87.6 FL (ref 81.4–97.8)
METAMYELOCYTES NFR BLD MANUAL: 0.8 %
MONOCYTES # BLD AUTO: 0.55 K/UL (ref 0–0.85)
MONOCYTES NFR BLD AUTO: 32.5 % (ref 0–13.4)
MORPHOLOGY BLD-IMP: NORMAL
MYELOCYTES NFR BLD MANUAL: 1.7 %
NEUTROPHILS # BLD AUTO: 0.73 K/UL (ref 1.82–7.42)
NEUTROPHILS NFR BLD: 40.2 % (ref 44–72)
NEUTS BAND NFR BLD MANUAL: 2.6 % (ref 0–10)
NRBC # BLD AUTO: 0.02 K/UL
NRBC BLD-RTO: 1.1 /100 WBC
PLATELET # BLD AUTO: 44 K/UL (ref 164–446)
PLATELET BLD QL SMEAR: NORMAL
PMV BLD AUTO: 10.9 FL (ref 9–12.9)
PRODUCT TYPE UPROD: NORMAL
RBC # BLD AUTO: 2.58 M/UL (ref 4.7–6.1)
RBC BLD AUTO: NORMAL
RH BLD: NORMAL
UNIT STATUS USTAT: NORMAL
WBC # BLD AUTO: 1.7 K/UL (ref 4.8–10.8)

## 2023-02-16 PROCEDURE — 700105 HCHG RX REV CODE 258: Performed by: PEDIATRICS

## 2023-02-16 PROCEDURE — 700111 HCHG RX REV CODE 636 W/ 250 OVERRIDE (IP): Performed by: PEDIATRICS

## 2023-02-16 PROCEDURE — 85007 BL SMEAR W/DIFF WBC COUNT: CPT

## 2023-02-16 PROCEDURE — 700102 HCHG RX REV CODE 250 W/ 637 OVERRIDE(OP): Performed by: PEDIATRICS

## 2023-02-16 PROCEDURE — 86901 BLOOD TYPING SEROLOGIC RH(D): CPT

## 2023-02-16 PROCEDURE — 99232 SBSQ HOSP IP/OBS MODERATE 35: CPT | Performed by: PEDIATRICS

## 2023-02-16 PROCEDURE — 86923 COMPATIBILITY TEST ELECTRIC: CPT

## 2023-02-16 PROCEDURE — 85025 COMPLETE CBC W/AUTO DIFF WBC: CPT

## 2023-02-16 PROCEDURE — A9270 NON-COVERED ITEM OR SERVICE: HCPCS | Performed by: PEDIATRICS

## 2023-02-16 PROCEDURE — 99222 1ST HOSP IP/OBS MODERATE 55: CPT | Performed by: RADIOLOGY

## 2023-02-16 PROCEDURE — 86900 BLOOD TYPING SEROLOGIC ABO: CPT

## 2023-02-16 PROCEDURE — 770003 HCHG ROOM/CARE - PEDIATRIC PRIVATE*

## 2023-02-16 PROCEDURE — 86850 RBC ANTIBODY SCREEN: CPT

## 2023-02-16 PROCEDURE — 99232 SBSQ HOSP IP/OBS MODERATE 35: CPT | Mod: 57 | Performed by: ORTHOPAEDIC SURGERY

## 2023-02-16 PROCEDURE — 86945 BLOOD PRODUCT/IRRADIATION: CPT

## 2023-02-16 PROCEDURE — 36430 TRANSFUSION BLD/BLD COMPNT: CPT

## 2023-02-16 PROCEDURE — P9016 RBC LEUKOCYTES REDUCED: HCPCS

## 2023-02-16 RX ORDER — HYDROMORPHONE HYDROCHLORIDE 1 MG/ML
1 INJECTION, SOLUTION INTRAMUSCULAR; INTRAVENOUS; SUBCUTANEOUS ONCE
Status: COMPLETED | OUTPATIENT
Start: 2023-02-16 | End: 2023-02-16

## 2023-02-16 RX ADMIN — CHLORHEXIDINE GLUCONATE 0.12% ORAL RINSE 15 ML: 1.2 LIQUID ORAL at 16:34

## 2023-02-16 RX ADMIN — VANCOMYCIN HYDROCHLORIDE 1000 MG: 500 INJECTION, POWDER, LYOPHILIZED, FOR SOLUTION INTRAVENOUS at 02:00

## 2023-02-16 RX ADMIN — CEFEPIME 2 G: 2 INJECTION, POWDER, FOR SOLUTION INTRAVENOUS at 16:40

## 2023-02-16 RX ADMIN — IMATINIB MESYLATE 200 MG: 100 TABLET, FILM COATED ORAL at 05:58

## 2023-02-16 RX ADMIN — FAMOTIDINE 20 MG: 20 TABLET ORAL at 06:00

## 2023-02-16 RX ADMIN — CHLORHEXIDINE GLUCONATE 0.12% ORAL RINSE 15 ML: 1.2 LIQUID ORAL at 08:22

## 2023-02-16 RX ADMIN — Medication 1000 UNITS: at 06:00

## 2023-02-16 RX ADMIN — ACETAMINOPHEN 650 MG: 325 TABLET, FILM COATED ORAL at 18:28

## 2023-02-16 RX ADMIN — ACETAMINOPHEN 650 MG: 325 TABLET, FILM COATED ORAL at 06:00

## 2023-02-16 RX ADMIN — FAMOTIDINE 20 MG: 20 TABLET ORAL at 18:28

## 2023-02-16 RX ADMIN — DEXTROSE AND SODIUM CHLORIDE: 5; 450 INJECTION, SOLUTION INTRAVENOUS at 22:49

## 2023-02-16 RX ADMIN — CHLORHEXIDINE GLUCONATE 0.12% ORAL RINSE 15 ML: 1.2 LIQUID ORAL at 13:04

## 2023-02-16 RX ADMIN — IMATINIB MESYLATE 400 MG: 400 TABLET, FILM COATED ORAL at 05:58

## 2023-02-16 RX ADMIN — HYDROMORPHONE HYDROCHLORIDE 1 MG: 1 INJECTION, SOLUTION INTRAMUSCULAR; INTRAVENOUS; SUBCUTANEOUS at 13:03

## 2023-02-16 RX ADMIN — ACETAMINOPHEN 650 MG: 325 TABLET, FILM COATED ORAL at 12:06

## 2023-02-16 RX ADMIN — ONDANSETRON 8 MG: 2 INJECTION INTRAMUSCULAR; INTRAVENOUS at 08:19

## 2023-02-16 RX ADMIN — ACETAMINOPHEN 650 MG: 325 TABLET, FILM COATED ORAL at 00:00

## 2023-02-16 RX ADMIN — CEFEPIME 2 G: 2 INJECTION, POWDER, FOR SOLUTION INTRAVENOUS at 00:00

## 2023-02-16 RX ADMIN — Medication 1 TABLET: at 06:00

## 2023-02-16 RX ADMIN — VANCOMYCIN HYDROCHLORIDE 1000 MG: 500 INJECTION, POWDER, LYOPHILIZED, FOR SOLUTION INTRAVENOUS at 18:29

## 2023-02-16 RX ADMIN — CEFEPIME 2 G: 2 INJECTION, POWDER, FOR SOLUTION INTRAVENOUS at 08:19

## 2023-02-16 RX ADMIN — ONDANSETRON 8 MG: 2 INJECTION INTRAMUSCULAR; INTRAVENOUS at 16:33

## 2023-02-16 RX ADMIN — VANCOMYCIN HYDROCHLORIDE 1000 MG: 500 INJECTION, POWDER, LYOPHILIZED, FOR SOLUTION INTRAVENOUS at 10:20

## 2023-02-16 RX ADMIN — Medication: at 08:19

## 2023-02-16 RX ADMIN — ONDANSETRON 8 MG: 2 INJECTION INTRAMUSCULAR; INTRAVENOUS at 00:00

## 2023-02-16 RX ADMIN — SENNOSIDES AND DOCUSATE SODIUM 1 TABLET: 50; 8.6 TABLET ORAL at 06:00

## 2023-02-16 ASSESSMENT — PAIN DESCRIPTION - PAIN TYPE
TYPE: ACUTE PAIN

## 2023-02-16 NOTE — PROGRESS NOTES
Pt's monitor alarmed for oxygen saturation 82%. RN in to assess pt. Pt in bed, appeared pale, c/o SOB and increased WOB noted. Pt states he stood up from bed to throw trash away and got short of breath. Oxygen saturation dropped to 74%, HR increased to 158. O2 increased to 5L. Pt's oxygen saturation recovered to 92% in approximately 5 seconds. Oxygen decreased to 2L. , oxygen saturation 100%. Will continue to wean O2 as tolerated.

## 2023-02-16 NOTE — PROGRESS NOTES
Pt demonstrates ability to turn self in bed without assistance of staff. Patient and family understands importance in prevention of skin breakdown, ulcers, and potential infection. Hourly rounding in effect. RN skin check complete.     Devices in place include: pulse ox probe, central line (port), nasal cannula.  Skin assessed under devices: Yes.  Confirmed HAPI identified on the following date: na   Location of HAPI: na.  Wound Care RN following: No.  The following interventions are in place: pulse ox site repositioned during shift, port assessed Q2hrs for skin breakdown/redness/infiltration, CHG baths daily performed by patient, nasal cannula repositioned often by patient and skin under devices assessed Q4hrs for skin breakdown/redness, pt repositions self often.

## 2023-02-16 NOTE — CARE PLAN
Problem: Nutrition - Standard  Goal: Patient's nutritional and fluid intake will be adequate or improve  Outcome: Not Progressing     Problem: Knowledge Deficit - Standard  Goal: Patient and family/care givers will demonstrate understanding of plan of care, disease process/condition, diagnostic tests and medications  Outcome: Progressing     Problem: Respiratory  Goal: Patient will achieve/maintain optimum respiratory ventilation and gas exchange  Outcome: Progressing     The patient is Stable - Low risk of patient condition declining or worsening    Shift Goals  Clinical Goals: adequate pain control, titrate supplemental O2 as tolerated, VSS, improved PO intake  Patient Goals: to rest today and continue to get stronger and feel better  Family Goals: felicita, not at bedside    Progress made toward(s) clinical / shift goals:  continued use with dilaudid PCA and pt stating that pain is better after pressing PCA button and with rest; pt taking good fluid intake - PO intake still needs improvement; VSS  with intermittent tachycardia that MD is aware of.    Patient is not progressing towards the following goals: Pt still requiring supplemental O2 with sustained desaturations when nasal cannula removed      Problem: Nutrition - Standard  Goal: Patient's nutritional and fluid intake will be adequate or improve  Outcome: Not Progressing

## 2023-02-16 NOTE — PROGRESS NOTES
RADIATION ONCOLOGY CONSULT    Patient name:  Tomas Jean-Baptiste    Primary Physician:  Margarito Arvizu M.D. MRN: [id number]  [facility]: [id number]   Referring physician:  Alyce Duenas M.D. : 2000, 22 y.o.       DATE OF SERVICE: 2023    IDENTIFICATION: A 22 y.o. male with   Visit Diagnoses     ICD-10-CM   1. Myositis of left upper arm, unspecified myositis type  M60.822   2. Port-A-Cath in place  Z95.828       He is being seen in consultation at the kind request of Dr. Faye.      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 This required hospitalization, currently on hospital day 10, and treatment with narcotics for pain control. The patient recently underwent CT imaging which showed a fluid collection and possible bone involvement which will potentially require surgery due to concern for myositis and abscess formation. Given the patient's current condition, his imatinib has been postponed which will likely postpone his radiation therapy as well, and we will plan to coordinate with pediatric hematology oncology for final radiation management.    Currently, he is somewhat fatigued, but is doing reasonably okay.  His main complaint is progressive pain and swelling in his left axilla, and he is being followed by orthopedic surgery for consideration of I&D and further surgical management.    With regards to his ALL, he is current on AR Arm B, and completed delayed  intensification, pending further systemic therapy and coordination for CNS3 XRT management.    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);BCR-ABL1 (HCC)    Encounter for chemotherapy management    Thrombocytopenia (HCC)    At risk for nausea and vomiting    At risk for opportunistic infections    Port-A-Cath in place    Acute lymphoblastic leukemia (ALL) (HCC)    At high risk for venous thromboembolism (VTE)    Anemia associated with chemotherapy    ALL (acute lymphoid leukemia) in remission (HCC)    Febrile neutropenia (HCC)    Chemotherapy induced nausea and vomiting    Diarrhea of presumed infectious origin        PAST SURGICAL HISTORY:  Past Surgical History:   Procedure Laterality Date    CATH PLACEMENT Right 8/29/2022    Procedure: INSERTION, CATHETER;  Surgeon: Simona Moise M.D.;  Location: SURGERY [facility];  Service: Ent       CURRENT MEDICATIONS:  Current Facility-Administered Medications   Medication Dose Route Frequency Provider Last Rate Last Admin    HYDROmorphone (DILAUDID) 0.2 mg/mL in 50 mL NS (PCA)   Intravenous Continuous River Rodriguez M.D.   Paused at 02/16/23 1302    heparin lock flush 100 unit/mL injection 300-500 Units  300-500 Units Intracatheter PRN River Rodriguez M.D.   500 Units at 02/14/23 1648    vancomycin (VANCOCIN) 1,000 mg in  mL IVPB  1,000 mg Intravenous Q8HR River Rodriguez M.D.   Recheck at 02/16/23 1030    Pharmacy Consult Request ...Pain Management Review 1 Each  1 Each Other PHARMACY TO DOSE River Rodriguez M.D.        acetaminophen (Tylenol) tablet 650 mg  650 mg Oral Q6HRS River Rodriguez M.D.   650 mg at 02/16/23 1206    Followed by    [START ON 2/18/2023] acetaminophen (Tylenol) tablet 650 mg  650 mg Oral Q6HRS  PRN River Rodriguez M.D.        senna-docusate (PERICOLACE or SENOKOT S) 8.6-50 MG per tablet 1 Tablet  1 Tablet Oral DAILY Alyce Duenas M.D.   1 Tablet at 02/16/23 0600    lidocaine (LIDODERM) 5 % 1 Patch  1 Patch Transdermal Q24HRS PRN Alyce Duenas M.D. MD Alert...Vancomycin per Pharmacy   Other PHARMACY TO DOSE River Rodriguez M.D.        LORazepam (ATIVAN) injection 0.5 mg  0.5 mg Intravenous Q6HRS PRN Alyce Duenas M.D.        lidocaine-prilocaine (EMLA) 2.5-2.5 % cream   Topical PRN Alyce Duenas M.D.        D5 1/2 NS infusion   Intravenous Continuous Alyce Duenas M.D.   Paused at 02/16/23 1302    cefepime (Maxipime) 2 g in  mL IVPB  2 g Intravenous Q8HRS River Rodriguez M.D.   Stopped at 02/16/23 0849    ondansetron (ZOFRAN) syringe/vial injection 8 mg  8 mg Intravenous Q8HRS Alyce Duenas M.D.   8 mg at 02/16/23 0819    famotidine (PEPCID) tablet 20 mg  20 mg Oral BID Alyce Duenas M.D.   20 mg at 02/16/23 0600    chlorhexidine (PERIDEX) 0.12 % solution 15 mL  15 mL Mouth/Throat 4X/DAY Alyce Duenas M.D.   15 mL at 02/16/23 1304    imatinib (Gleevec) tablet 200 mg  200 mg Oral DAILY Alyce Duenas M.D.   200 mg at 02/16/23 0558    imatinib (Gleevec) tablet 400 mg  400 mg Oral DAILY Alyce Duenas M.D.   400 mg at 02/16/23 0558    therapeutic multivitamin-minerals (THERAGRAN-M) tablet 1 Tablet  1 Tablet Oral DAILY Alyce Duenas M.D.   1 Tablet at 02/16/23 0600    vitamin D3 (cholecalciferol) tablet 1,000 Units  1,000 Units Oral DAILY Alyce Duenas M.D.   1,000 Units at 02/16/23 0600       ALLERGIES:    Amoxicillin    FAMILY HISTORY:    family history includes Diabetes in his paternal grandfather; Hyperlipidemia in his paternal grandfather; Hypertension in his paternal grandfather; No Known Problems in his mother; Stroke in his maternal grandmother.    SOCIAL HISTORY:     reports that he has never smoked. He has never used smokeless tobacco. He reports that he does not drink alcohol and  "does not use drugs.    REVIEW OF SYSTEMS:  A complete review of system taken. Pertinent items in HPI.       PHYSICAL EXAM:    PERFORMANCE STATUS:  /59   Pulse (!) 122   Temp 36.9 °C (98.4 °F) (Temporal)   Resp 13   Ht 1.651 m (5' 5\")   Wt 60.8 kg (134 lb 0.6 oz)   SpO2 98%   BMI 22.31 kg/m²   Physical Exam  Constitutional:       Appearance: Normal appearance. He is ill-appearing.   HENT:      Head: Normocephalic and atraumatic.   Eyes:      Extraocular Movements: Extraocular movements intact.      Pupils: Pupils are equal, round, and reactive to light.   Cardiovascular:      Rate and Rhythm: Normal rate and regular rhythm.   Pulmonary:      Effort: Pulmonary effort is normal.      Breath sounds: Normal breath sounds.   Musculoskeletal:      Comments: Tenderness in the left axilla with limited movement, with edema of the left upper extremity in the arm and forearm.  No erythema.   Neurological:      General: No focal deficit present.      Mental Status: He is alert and oriented to person, place, and time.        LABORATORY DATA:   Recent Labs     02/14/23  0413 02/15/23  0505 02/16/23  0415   WBC 1.2* 1.5* 1.7*   RBC 2.79* 2.61* 2.58*   HEMOGLOBIN 7.8* 7.3* 7.3*   HEMATOCRIT 23.7* 22.0* 22.6*   MCV 84.9 84.3 87.6   MCH 28.0 28.0 28.3   MCHC 32.9* 33.2* 32.3*   RDW 50.9* 48.6 51.7*   PLATELETCT 45* 36* 44*   MPV 12.0 12.0 10.9              RADIOLOGY DATA:  CT-ABDOMEN-PELVIS WITH    Result Date: 12/27/2022  1.  Hepatic steatosis. 2.  No acute inflammatory abnormality within the abdomen or pelvis.    CT-EXTREMITY, LOWER WITH LEFT    Result Date: 1/13/2023  1.  Intramuscular rim-enhancing collection in the upper to mid calf laterally measuring about 2.5 x 1.6 x 5.1 cm, suspicious for abscess. Evolving hematoma would be differential consideration. 2.  No soft tissue gas or other fluid collection. 3.  No acute osseous abnormality.    CT-EXTREMITY, LOWER WITH RIGHT    Result Date: 1/13/2023  1.  No evidence of " abscess or soft tissue gas. 2.  No acute osseous abnormality.    CT-EXTREMITY, UPPER WITH LEFT    Result Date: 2/16/2023  1.  Minimally enhancing left glenohumeral joint effusion, there is enhancing intramuscular fluid collection inferior to the joint space with appearance favoring vascular abscess, and may communicate with the joint effusion which raises concern for possible septic joint. 2.  Permeative appearance of the proximal humerus near suspected abscess, concerning for osteomyelitis, could be related to malignant changes given history. 3.  Scattered irregular ill-defined lytic areas in multiple vertebral bodies, likely corresponding with history of malignancy. 4.  Diffuse subcutaneous fat stranding from the mid upper arm through the visualized forearm, could represent edema and/or cellulitis. 5.  Small layering left pleural effusion. Linear densities of the left lung base favors atelectasis These findings were discussed with the patient's clinician, River Rodriguez, on 2/16/2023 7:40 AM.    CT-HEAD W/O    Result Date: 12/27/2022  Head CT without contrast within normal limits. No evidence of acute cerebral infarction, hemorrhage or mass lesion.     CT-MAXILLOFACIAL W/O PLUS RECONS    Result Date: 12/27/2022  1.  Essentially negative maxillofacial sinus CT. Small retention cyst in the alveolar recess of the right maxillary sinus of no clinical significance.    DX-CHEST-PORTABLE (1 VIEW)    Result Date: 1/5/2023  No acute cardiopulmonary abnormality.    DX-CHEST-PORTABLE (1 VIEW)    Result Date: 1/4/2023  1.  Increased inflation and improvement of LEFT lung base consolidation. 2.  Supportive tubing is unchanged.    DX-CHEST-PORTABLE (1 VIEW)    Result Date: 1/3/2023  1.  Suspect left lower lobe atelectasis or consolidation. Developing since prior exam.    DX-CHEST-PORTABLE (1 VIEW)    Result Date: 1/2/2023  Bilateral pulmonary opacities have essentially resolved. No effusions.     DX-CHEST-PORTABLE (1  VIEW)    Result Date: 1/1/2023  Stable bilateral pulmonary opacities. No significant pleural effusions.     DX-CHEST-PORTABLE (1 VIEW)    Result Date: 12/31/2022  Stable bilateral pulmonary infiltrates.    DX-CHEST-PORTABLE (1 VIEW)    Result Date: 12/30/2022  Worsening bilateral pulmonary infiltrates.    DX-CHEST-PORTABLE (1 VIEW)    Result Date: 12/29/2022  1.  Stable patchy bilateral interstitial opacities which could be seen in setting of edema and/or infection. 2.  Stable mild enlargement of the cardiomediastinal silhouette.    DX-CHEST-PORTABLE (1 VIEW)    Result Date: 12/29/2022  1.  Supportive tubing as described above. 2.  No other significant change from prior exam.     DX-CHEST-PORTABLE (1 VIEW)    Result Date: 12/28/2022  No significant change from prior exam.    DX-CHEST-PORTABLE (1 VIEW)    Result Date: 12/28/2022  Worsening hypoinflation and bilateral perihilar infiltrates as well as prominence of the pulmonary vasculature suggesting pulmonary edema.  Superimposed pneumonia is not excluded.    DX-CHEST-PORTABLE (1 VIEW)    Result Date: 12/27/2022  No evidence of acute cardiopulmonary process.    DX-SHOULDER 2+ LEFT    Result Date: 2/8/2023  No fracture or dislocation of LEFT shoulder.    MR-SHOULDER-WITH & W/O LEFT    Result Date: 2/10/2023  1.  Diffuse abnormal marrow signal involving the visualized portions of the humerus, scapula and clavicle characterized by mottled serpiginous heterogeneous areas of increased T2 and decreased T1 signal. This most likely is related to the patient's history of leukemia and represents a diffuse marrow disorder. 2.  Some periosteal enhancement surrounding the proximal humeral diaphysis and metaphysis with some slight cortical irregularity posteriorly and medially. This may be related to neoplastic process however infectious process could potentially have this appearance as well. 3.  Diffuse edema and enhancement of the deltoid, latissimus dorsi, short head of the  biceps, coracobrachialis, supraspinatus, infraspinatus and subscapularis muscles with a small amount of intermuscular edema/fluid suggesting myositis. 4.  No focal abscess. 5.  Small glenohumeral joint effusion. 6.  No evidence of fracture.    US-EXTREMITY NON VASCULAR UNILATERAL LEFT    Result Date: 1/12/2023  1.  Probable hematoma in the area of pain and swelling in the left posterior lateral calf. Recommend clinical correlation and follow-up evaluation to evaluate for evolution.    IR-CONSULT AND TREAT    Result Date: 1/14/2023  Ultrasound-guided left lower extremity fluid aspiration as described.       IMPRESSION:    A 22 y.o. with B lymphoblastic leukemia, ABL1 fusion positive undergoing treatment on AA 1631, CNS3 at diagnosis, who is currently admitted with concern for myositis and osteomyelitis, pending further management will ultimately need whole brain radiation as per protocol.      RECOMMENDATIONS:   At this point, certainly the primary focus of his disease will be the management of his current acute admission with the myositis and concern for osteomyelitis, and we will plan to continue to follow his overall cancer treatment course as an outpatient over the next few weeks.  He will eventually need whole brain radiation therapy for his initial CNS 3 disease, but this will likely be delayed depending on when he reaches maintenance therapy phase of his treatment.  For now, we will plan to see him as an outpatient in about 2 weeks again for check-in, and to review the specifics of whole brain radiation planning, as well as risks and benefits at that time, and details of radiation therapy at that time.      Thank you for the opportunity to participate in his care.  If any questions or comments, please do not hesitate in calling.

## 2023-02-16 NOTE — PROGRESS NOTES
Pediatric Orthopedic Consult Note:    Luke Haddad M.D.  Date & Time note created:    2/12/2023  10:00 a.m.     Referring MD:  Dr. Alyce Duenas    Patient ID:  Name:             Tomas Jean-Baptiste   YOB: 2000  Age:                 22 y.o.  male   MRN:               [id number]                                                     Chief complaint: Left shoulder pain    History of present illness:  Tomas Jean-Baptiste 22 y.o. male who was admitted due to neutropenic fever from treatment of his leukemia. He started having left shoulder pain about 1 weeks ago. He feels that today may be a little worse, requiring pain medication to reduce the pain.    Interval History (2/11/2023):  JULY was examined this morning. His pain profile is relatively unchanged from yesterday. The pain medication helps quite a bit. Still has pain in anterior left shoulder.    Interval History (2/12/2023):  JULY was re-examined this morning. His pain profile is still unchanged from prior. The pain medication continues to help out quite a bit. Still has pain in anterior left shoulder. His labs have improved as has his fever curve.    Interval History (2/13/2023):  JULY was re-examined today. His pain profile is slightly worsened than prior days. The pain medication continues to help, but his left anterior shoulder pain persists. His labs have improved as has his fever curve, but his arm has become significantly more swollen overnight.    Interval History (2/14/2023):  JULY was re-examined today. His right shoulder, upper arm, and elbow have become more swollen with pain controlled on dilaudid. His labs continue to improve and he has remained afebrile.    Interval History (2/15/2023):  JULY was re-examined today. He was a little more somnolent today. His right shoulder, upper arm, and elbow have become more swollen with pain controlled on dilaudid. He complains of more pain today than prior.    Past medical history:   Past  Medical History:   Diagnosis Date    Cancer (HCC)     Leukoma        Past surgical history:   Past Surgical History:   Procedure Laterality Date    CATH PLACEMENT Right 8/29/2022    Procedure: INSERTION, CATHETER;  Surgeon: Simona Moise M.D.;  Location: [facility]ERY [facility];  Service: Ent       Family history:   Family History   Problem Relation Age of Onset    No Known Problems Mother     Stroke Maternal Grandmother     Diabetes Paternal Grandfather     Hypertension Paternal Grandfather     Hyperlipidemia Paternal Grandfather     Cancer Neg Hx     Heart Disease Neg Hx        Social history:   Social History     Socioeconomic History    Marital status: Single     Spouse name: Not on file    Number of children: Not on file    Years of education: Not on file    Highest education level: Not on file   Occupational History    Not on file   Tobacco Use    Smoking status: Never    Smokeless tobacco: Never   Vaping Use    Vaping Use: Never used   Substance and Sexual Activity    Alcohol use: Never    Drug use: Never    Sexual activity: Not Currently   Other Topics Concern    Behavioral problems Not Asked    Interpersonal relationships Not Asked    Sad or not enjoying activities Not Asked    Suicidal thoughts Not Asked    Poor school performance Not Asked    Reading difficulties Not Asked    Speech difficulties Not Asked    Writing difficulties Not Asked    Inadequate sleep Not Asked    Excessive TV viewing Not Asked    Excessive video game use Not Asked    Inadequate exercise Not Asked    Sports related Not Asked    Poor diet Not Asked    Family concerns for drug/alcohol abuse Not Asked    Poor oral hygiene Not Asked    Bike safety Not Asked    Family concerns vehicle safety Not Asked   Social History Narrative    Not on file     Social Determinants of Health     Financial Resource Strain: Not on file   Food Insecurity: Not on file   Transportation Needs: Not on file   Physical Activity: Not on file   Stress: Not on file    Social Connections: Not on file   Intimate Partner Violence: Not on file   Housing Stability: Not on file       Current medications:   Current Facility-Administered Medications   Medication Dose    heparin lock flush 100 unit/mL injection 300-500 Units  300-500 Units    vancomycin (VANCOCIN) 1,000 mg in  mL IVPB  1,000 mg    Pharmacy Consult Request ...Pain Management Review 1 Each  1 Each    acetaminophen (Tylenol) tablet 650 mg  650 mg    Followed by    [START ON 2/18/2023] acetaminophen (Tylenol) tablet 650 mg  650 mg    HYDROmorphone (DILAUDID) 0.2 mg/mL in 50 mL NS (PCA)      senna-docusate (PERICOLACE or SENOKOT S) 8.6-50 MG per tablet 1 Tablet  1 Tablet    lidocaine (LIDODERM) 5 % 1 Patch  1 Patch    MD Alert...Vancomycin per Pharmacy      LORazepam (ATIVAN) injection 0.5 mg  0.5 mg    lidocaine-prilocaine (EMLA) 2.5-2.5 % cream      D5 1/2 NS infusion      cefepime (Maxipime) 2 g in  mL IVPB  2 g    ondansetron (ZOFRAN) syringe/vial injection 8 mg  8 mg    famotidine (PEPCID) tablet 20 mg  20 mg    chlorhexidine (PERIDEX) 0.12 % solution 15 mL  15 mL    imatinib (Gleevec) tablet 200 mg  200 mg    imatinib (Gleevec) tablet 400 mg  400 mg    therapeutic multivitamin-minerals (THERAGRAN-M) tablet 1 Tablet  1 Tablet    vitamin D3 (cholecalciferol) tablet 1,000 Units  1,000 Units       Allergies:  Allergies   Allergen Reactions    Amoxicillin Rash     Reacted as an infant. No swelling or airway problems.  Tolerated cefepime June 2022       Immunizations:  Immunization History   Administered Date(s) Administered    Dtap Vaccine 2000, 01/29/2002, 06/24/2002, 01/15/2003, 01/23/2006    HIB Vaccine (ACTHIB/HIBERIX) 2001, 01/29/2002, 09/25/2002    HPV Quadrivalent Vaccine (GARDASIL) - HISTORICAL DATA 12/15/2014, 02/17/2015, 06/03/2015    Hepatitis A Vaccine, Ped/Adol 10/03/2003, 01/23/2006    Hepatitis B Vaccine Adolescent/Pediatric 2001, 01/29/2002, 09/25/2002    IPV 2000,  01/29/2002, 06/24/2002, 01/23/2006    Influenza (IM) Preservative Free - HISTORICAL DATA 12/09/2011, 10/17/2013, 09/18/2015, 10/05/2016    Influenza Seasonal Injectable - Historical Data 10/12/2012    Influenza Vac Subunit Quad Inj (Pf) 11/08/2017    Influenza Vaccine Quad Inj (Pf) 11/17/2014, 09/24/2018, 09/27/2019    MMR Vaccine 09/25/2002, 01/15/2003    Meningococcal Conjugate Vaccine MCV4 (MENVEO) 09/24/2018    Meningococcal Conjugate Vaccine MCV4 (Menactra) 12/11/2012    Pneumococcal Vaccine (PCV7) - HISTORICAL DATA 2001, 03/08/2002, 06/24/2002    Tdap Vaccine 10/12/2012    Varicella Vaccine Live 09/25/2002, 01/17/2007       Review of systems:   Constitutional: Fatigued, fevcers,  HENT: Normal breathing, but alopecia  Eyes: Negative for photophobia, discharge and redness.   Respiratory: Negative for cough, wheezing and stridor.    Cardiovascular: Negative for leg swelling.   Gastrointestinal: Negative for constipation, diarrhea, nausea and vomiting.   Genitourinary: No renal disease or abnormalities   Musculoskeletal: Negative for back pain and neck pain, but (+) for left shoulder pain  Skin: Negative for rash.   Neurological: Negative for tremors, sensory change, speech change, focal weakness, seizures, loss of consciousness and weakness.   Endo/Heme/Allergies: thrombocytopenia (+), neutropenia (+)      Physical examination:   Vitals:    02/16/23 0400 02/16/23 0500 02/16/23 0600 02/16/23 0825   BP: 108/49   109/66   Pulse: 96 94 (!) 109 (!) 102   Resp: 17   15   Temp: 36.2 °C (97.2 °F)   36.6 °C (97.8 °F)   TempSrc: Temporal   Temporal   SpO2: 98% 99% 100% 98%   Weight:       Height:         Physical Exam    Weak-appearing, but appropriate & conversive    Left upper extremity:   Left wrist, fingers, elbow, & forearm full ROM, TTP (-)   Increased swelling of left arm from forearm to shoulder (+)   Left shoulder painful with AROM, TTP (+) anteriorly, full, PROM with increased pain   Neuro intract  (+)   Skin warm to touch, but febrile & equal to other limbs    ANCILLARY DATA REVIEWED:     Lab Data Review:  Recent Labs     02/14/23  0413 02/15/23  0505 02/16/23  0415   WBC 1.2* 1.5* 1.7*   RBC 2.79* 2.61* 2.58*   HEMOGLOBIN 7.8* 7.3* 7.3*   HEMATOCRIT 23.7* 22.0* 22.6*   MCV 84.9 84.3 87.6   MCH 28.0 28.0 28.3   MCHC 32.9* 33.2* 32.3*   RDW 50.9* 48.6 51.7*   PLATELETCT 45* 36* 44*   MPV 12.0 12.0 10.9                   MICRO:  Blood cultures from 2/9/2023 - no growth     Imaging  XR's left shoulder - negative for fracture or acute injury    MRI left shoulder - inflammation in muscles / myositis without focal collection or abscess; mild amount of joint fluid with fluid surrounding the biceps tendon; osseous changes, likely AVN vs. Myelodysplasia-related, no evidence of osteomyelitis or intra-osseous abscess    U/S left upper extremity - no evidence of DVT or fluid collection    ASSESSMENT AND PLAN:  Left shoulder myositis, possible biceps tendon involvement, possible developing abscess  Continue current antibiotics  Recommend ice pack to shoulder and encourage ROM of LUE  Discussed with Dr. Rodriguez whether this was a symptoms of return of neutrophils or myositis alone  At this point, no surgical intervention, but would recommend repeat advanced imaging CT vs. MRI with contrast to evaluate abscess formation  Will continue to follow    Luke Haddad M.D.  Pediatric Orthopedics and Scoliosis  [facility]

## 2023-02-16 NOTE — PROGRESS NOTES
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     Axial spiral CT images were obtained of the LEFT upper extremity from the shoulder to the elbow. Images were reviewed at soft tissue and bone windows with administration of 100 mL of Omnipaque 350 without complication.     Up to date radiation dose reduction adjustments have been utilized to meet ALARA standards for radiation dose reduction.     COMPARISON: None.     FINDINGS:     There is no visualized fracture, subluxation, or dislocation identified. Permeative appearance of the proximal humerus is seen. Scattered irregular ill-defined lytic areas are seen within multiple vertebral bodies.     Small glenohumeral joint effusion is seen which appears minimally enhancing. Inferior to the joint space is an enhancing fluid collection in the medial shoulder muscular soft tissues measuring 2.5 x 1.7 cm.     There is diffuse subcutaneous fat stranding of the mid upper arm extending distally through the forearm.     Small layering left  pleural effusion is seen. Linear densities in the left lung base are observed.     The visualized portions of the liver, right kidney, and pancreas appear unremarkable. The left kidney and spleen appear within normal limits. The visualized portions of the small bowel and colon appear grossly unremarkable.     IMPRESSION:        1.  Minimally enhancing left glenohumeral joint effusion, there is enhancing intramuscular fluid collection inferior to the joint space with appearance favoring vascular abscess, and may communicate with the joint effusion which raises concern for   possible septic joint.  2.  Permeative appearance of the proximal humerus near suspected abscess, concerning for osteomyelitis, could be related to malignant changes given history.  3.  Scattered irregular ill-defined lytic areas in multiple vertebral bodies, likely corresponding with history of malignancy.  4.  Diffuse subcutaneous fat stranding from the mid upper arm through the visualized forearm, could represent edema and/or cellulitis.  5.  Small layering left pleural effusion. Linear densities of the left lung base favors atelectasis     These findings were discussed with the patient's clinician, River Rodriguez, on 2/16/2023 7:40 AM.    Discussed with nursing staff, JULY Us and his mother.  T    Planning OR sometime today for drainage of apparent abscess.    NPO.    Transfuse 1 unit pRBCs.    1 unit of platelets on hold for OR.    ANN MARIE Rodriguez MD  Pediatric Hematology / Oncology  [facility]  Cell. [phone]  Office. [phone]

## 2023-02-16 NOTE — CARE PLAN
The patient is Watcher - Medium risk of patient condition declining or worsening    Shift Goals  Clinical Goals: obtain CT, patient will sleep comfortably, adequate pain control,remain vitally stable.  Patient Goals: sleep and pain control  Family Goals: felicita    Progress made toward(s) clinical / shift goals:      Problem: Knowledge Deficit - Standard  Goal: Patient and family/care givers will demonstrate understanding of plan of care, disease process/condition, diagnostic tests and medications  Outcome: Progressing  Note: Patient demonstrates a good understanding in plan of care      Problem: Psychosocial  Goal: Patient will experience minimized separation anxiety and fear  Outcome: Progressing  Note: Patient self reporting no anxiety      Problem: Security Measures  Goal: Patient and family will demonstrate understanding of security measures  Outcome: Progressing  Note: Patient demonstrates a good understanding in security measures      Problem: Self Care  Goal: Patient will have the ability to perform ADLs independently or with assistance (bathe, groom, dress, toilet and feed)  Outcome: Progressing  Note: Patient does well in assisting with ADL's , doing his best to perform independently      Problem: Pain - Standard  Goal: Alleviation of pain or a reduction in pain to the patient’s comfort goal  Outcome: Progressing  Note: Patient using PCA appropriately and reporting tolerable pain levels      Problem: Skin Integrity  Goal: Skin integrity is maintained or improved  Outcome: Progressing  Note: Patient skin remains intact, keeping close eye on swollen LUE        Patient is not progressing towards the following goals:

## 2023-02-16 NOTE — PROGRESS NOTES
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);BCR-ABL1 (HCC)    Encounter for chemotherapy management    Thrombocytopenia (HCC)    At risk for nausea and vomiting    At risk for opportunistic infections    Port-A-Cath in place    Acute lymphoblastic leukemia (ALL) (HCC)    At high risk for venous thromboembolism (VTE)    Anemia associated with chemotherapy    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    Review of Systems:     Constitutional: Afebrile.  Slightly better appetite.  HENT: Negative for nosebleeds, congestion, rhinorrhea, mouth sores.  Respiratory: Still requiring NC oxygen, especially with exertion.  Gastrointestinal: Negative for nausea, vomiting, abdominal pain; BM x 1!    Skin: Negative for rash or skin infection..  Neurological: Negative for numbness, tingling, sensory changes, weakness or headaches.    Endo/Heme/Allergies: No bruising/bleeding easily.    Psychiatric/Behavioral: Frustrated.     Medications:    Current Facility-Administered Medications:     heparin lock flush 100 unit/mL injection 300-500 Units, 300-500 Units, Intracatheter, PRN, River Rodriguez M.D., 500 Units at 23 5098    vancomycin (VANCOCIN) 1,000 mg  in  mL IVPB, 1,000 mg, Intravenous, Q8HR, River Rodriguez M.D., Stopped at 02/15/23 1228    Pharmacy Consult Request ...Pain Management Review 1 Each, 1 Each, Other, PHARMACY TO DOSE, River Rodriguez M.D.    acetaminophen (Tylenol) tablet 650 mg, 650 mg, Oral, Q6HRS, 650 mg at 02/15/23 1224 **FOLLOWED BY** [START ON 2/18/2023] acetaminophen (Tylenol) tablet 650 mg, 650 mg, Oral, Q6HRS PRN, River Rodriguez M.D.    HYDROmorphone (DILAUDID) 0.2 mg/mL in 50 mL NS (PCA), , Intravenous, Continuous, River Rodriguez M.D., New Bag at 02/15/23 1330    senna-docusate (PERICOLACE or SENOKOT S) 8.6-50 MG per tablet 1 Tablet, 1 Tablet, Oral, DAILY, Alyce Duenas M.D., 1 Tablet at 02/15/23 0620    lidocaine (LIDODERM) 5 % 1 Patch, 1 Patch, Transdermal, Q24HRS PRN, Alyce Duenas M.D.    MD Alert...Vancomycin per Pharmacy, , Other, PHARMACY TO DOSE, River Rodriguez M.D.    LORazepam (ATIVAN) injection 0.5 mg, 0.5 mg, Intravenous, Q6HRS PRN, Alyce Duenas M.D.    lidocaine-prilocaine (EMLA) 2.5-2.5 % cream, , Topical, PRN, Alyce Duenas M.D.    D5 1/2 NS infusion, , Intravenous, Continuous, Alyce Duenas M.D., Last Rate: 75 mL/hr at 02/15/23 0749, Rate Verify at 02/15/23 0749    cefepime (Maxipime) 2 g in  mL IVPB, 2 g, Intravenous, Q8HRS, River Rodriguez M.D., Last Rate: 200 mL/hr at 02/15/23 1608, 2 g at 02/15/23 1608    ondansetron (ZOFRAN) syringe/vial injection 8 mg, 8 mg, Intravenous, Q8HRS, Alyce Duenas M.D., 8 mg at 02/15/23 1606    famotidine (PEPCID) tablet 20 mg, 20 mg, Oral, BID, Alyce Duenas M.D., 20 mg at 02/15/23 0619    chlorhexidine (PERIDEX) 0.12 % solution 15 mL, 15 mL, Mouth/Throat, 4X/DAY, Alyce Duenas M.D., 15 mL at 02/15/23 1606    imatinib (Gleevec) tablet 200 mg, 200 mg, Oral, DAILY, Alyce Duenas M.D., 200 mg at 02/15/23 0620    imatinib (Gleevec) tablet 400 mg, 400 mg, Oral, DAILY, Alyce Duenas M.D., 400 mg at 02/15/23 0620    therapeutic multivitamin-minerals  "(THERAGRAN-M) tablet 1 Tablet, 1 Tablet, Oral, DAILY, Alyce Duenas M.D., 1 Tablet at 23 0631    vitamin D3 (cholecalciferol) tablet 1,000 Units, 1,000 Units, Oral, DAILY, Alyce Duenas M.D., 1,000 Units at 02/15/23 0619    OBJECTIVE:     Max Temp: Temp (24hrs), Av °C (98.6 °F), Min:36.6 °C (97.9 °F), Max:37.7 °C (99.8 °F)      Vitals: /71   Pulse (!) 101   Temp 37.4 °C (99.3 °F) (Temporal)   Resp 16   Ht 1.651 m (5' 5\")   Wt 60.8 kg (134 lb 0.6 oz)   SpO2 97%   BMI 22.31 kg/m²       Intake/Output Summary (Last 24 hours) at 2/15/2023 1655  Last data filed at 2/15/2023 1600  Gross per 24 hour   Intake 2601.68 ml   Output 2730 ml   Net -128.32 ml       Labs:   Latest Reference Range & Units 23 04:13 02/15/23 05:05   WBC 4.8 - 10.8 K/uL 1.2 (LL) 1.5 (LL)   RBC 4.70 - 6.10 M/uL 2.79 (L) 2.61 (L)   Hemoglobin 14.0 - 18.0 g/dL 7.8 (L) 7.3 (L)   Hematocrit 42.0 - 52.0 % 23.7 (L) 22.0 (L)   MCV 81.4 - 97.8 fL 84.9 84.3   MCH 27.0 - 33.0 pg 28.0 28.0   MCHC 33.7 - 35.3 g/dL 32.9 (L) 33.2 (L)   RDW 35.9 - 50.0 fL 50.9 (H) 48.6   Platelet Count 164 - 446 K/uL 45 (L) 36 (L)   MPV 9.0 - 12.9 fL 12.0 12.0   Neutrophils-Polys 44.00 - 72.00 % 69.90 52.50   Neutrophils (Absolute) 1.82 - 7.42 K/uL 0.84 (L) 0.79 (L)   Lymphocytes 22.00 - 41.00 % 13.30 (L) 22.00   Lymphs (Absolute) 1.00 - 4.80 K/uL 0.16 (L) 0.33 (L)   Monocytes 0.00 - 13.40 % 16.80 (H) 24.60 (H)   Myelocytes %  0.90   Nucleated RBC /100 WBC 0.00 1.40       Physical Exam:    Constitutional: Alert, more interactive (just after giving himself a dose of Dilaudid)   HENT: Normocephalic and atraumatic. Alopecia.  No rhinorrhea. Oropharynx is clear and moist.   Eyes: Conjunctivae are normal. No scleral icterus.   Neck: Supple, no adenopathy.    Cardiovascular: RRR with pulmonary flow murmur  Pulmonary/Chest: Decreased effort (pain). Symmetric expansion.  Clear to auscultation bilaterally.  Abdomen: Soft. Bowel sounds are normal. No distension, " "organomegaly or mass.     Genitourinary:  Deferred  Musculoskeletal: Left arm swollen, tense (32 cm circumference, up from 31 cm yesterday); left hand NV intact.  Skin: No rash or evidence of skin infection. Port site clean and dry, accessed.       ASSESSMENT AND PLAN:     Tomas \"DIOMEDES Jean-Baptiste  is a 22 y.o. male who presented to the [facility] for Day 50 chemotherapy with Vincristine on 2/7/2023. During that appointment he was noted to be febrile with chills and dehydrated. Hence, decision made to admit him to pediatric ott for management of febrile neutropenia and dehydration.     Counts trending up. Orthopedic surgeon following patient; overnight, there has been fairly dramatic swelling of the left upper extremity with significantly increased pain.  JULY continues to be afebrile.  Patient's nausea resolved. No emesis. Hypoxia when deep sleep, likely also due to atelectasis. On 0.5 L oxygen via NC.     Febrile Neutropenia in an immunocompromised host: Etiology not clear: Counts trending up slowly. Afebrile.   - Previous history of gram negative septic shock  - Blood culture from port 2/7/2023: NGTD  - Repeat blood culture from port 2/9/2023: NGTD  - IV Cefepime every 8 hrs  - IV Vancomycin started on 2/10/2023, 1250 mg every 12 hrs     Left shoulder pain: Myositis/intermuscular edema/fluid  - Edema and pain are more extensive since yesterday  - X-ray L shoulder 2/8/2023: not concerning  - MRI shoulder 2/10/2023: showing diffuse myositis and some intermuscular edema/fluid  - No evidence of DVT  - This may simply reflect recovery of neutrophils BUT etiology of inflammation is unclear  - JULY is more receptive to PCA Dilaudid  - Added IV Vancomycin on 2/10/2023  - CT with contrast (per Dr. Haddad)     Tachycardia: orthostatic vs cardiac issues  - Some PVCs noted on tele but hemodynamically stable  - Recently admitted with severe septic shock, hence consider " cardiomyopathy  - Consulted cardiology (Dr Galvez): Holter monitor and repeat echo essentially unremarkable     Constipation secondary to opioid use:  - Start senna-docusate BID  - Encouraged ambulation as tolerated.  - BM today!     Retrosternal chest pain : likely from LLOYD, improved  - Continue famotidine 20 mg BID  - Continue to monitor     Hypoxemia: likely from opioids/atelectasis  - 85-86% with deep sleep  - Requiring 0.5 L oxygen at night/ even in am  - IVF at 50 ml/hr given patient on vancomycin, encourage ambulation as tolerated  - Ordered incentive spirometry  - Increased oxygen requirement today likely due to neutrophil recovery?  - If continues, will get CXR to r/o pleural effusion/edema  - Consider lasix IV x 1 (but weight is DOWN from last week)     Ph+ Precursor B-Cell Acute Lymphoblastic Leukemia, in MRD Remission:  - 2-3 weeks of symptoms  - Presenting WBC > 440 k/uL, hyperleukocytosis  - Start of Hydroxyurea (1500 mg PO Q8) 2320 on 5/27/2022  - discontinued after only 55 hours  - No steroid pretreatment  - 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  - Testicular status NEGATIVE     - Flow cytometry of both 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, FISH positive for BCR-ABL1 translocation  - Enrolled on [facility] [id number]  - Initially enrolled on [facility] [id number] - but 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     - Enrolled on [facility] [id number] and began study 6/13/2022  - Started imatinib therapy 6/3/2022  - End of Induction IA and IB MRD negative  - Imatinib dose increased to 400 mg PO AM and 250 mg PM 9/29/2022  * Note:  All imatinib doses given inpatient rounded down to 600 mg        - Imatinib held for Septic Shock 12/27/2022-1/8/2023        - Imatinib 600 mg PO daily restarted 1/8/2023 inpatient        - Imatinib 400 mg PO QAM and 250 mg PO QHS 1/14/2023-1/17/2023        - Imatinib 600 mg PO QAM 1/17/2023 - present     [place] [id number], AR Arm B, Delayed  "Intensification: COMPLETE   ** \"Interim Maintenance\" ON HOLD pending improvement  ** Continue imatinib 600 mg PO daily                     Chemotherapy-related Pancytopenia:  - Transfuse for hemoglobin less than 7 gm/dL or with symptoms  - Transfuse for platelets less than 10,000/microliters or with symptoms  - S/p pRBCs transfusion on 2/8/2023, 2/9/2023  - S/p platelet transfusion 2/11/2023  - CBC daily: ANC slowly upward; plts stable     At risk for deep vein thrombosis due to pegaspargase:  - Platelets still low  - Will hold apixaban  - Ordered SCD, encourage ambulation as tolerated     At Risk of Opportunistic Lung Infection:  - Bactrim DS PO BID Sat and Sun for PJP prophylaxis     Central Access:   - R Port-A-Cath in place      Research Participant:           [facility] [facility] - Source Data         Diagnosis: Ph+ Precursor B-Cell Acute Lymphoblastic Leukemia     Disease Status: EOI1A MRD NEGATIVE, EOI1B RD NEGATIVE, CNS3c, testicular negative, HSV1 IgG POSITIVE, CMV IgG NEGATIVE, VARICELLA IgG POSITIVE     Active Studies: [id number], [id number]                                                                                                      Inactive Studies: [id number]                                                                                                                                                Optional Studies: None             Protocol: International Phase 3 trial in [place] 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      Treatment Plan: [id number](OS), AR-Arm B, Delayed Intensification, Day 58     Height: 1.650 m      Weight: 64.2kg       BSA: 1.72 m²   (Delayed Intensification Day 29 1/17/2023)                                                                                                                                           Performance Status: Karnofsky 70, ECOG  2 (1/31/2023)   "   Treatment Plan Medications:  (100% adherent with imatinib)     Imatinib dose adjusted for weight at DI, Day 29 to Imatinib 600 mg PO daily in AM     Evaluations / Study Labs:  (2/12/2023) : None     Therapy Given: (2/12/2023):  Imatinib 600 mg PO QAM     Toxicities:  **Grade 3 febrile neutropenia on 2/7/2023 (ANC <1000/mm3 with a single temperature of >38.3 degrees C (101 degrees F) or a sustained temperature of >=38 degrees C (100.4 degrees F) for more than one hour)  ** Grade 3 diarrhea on 2/6/2023 (Increase of >=7 stools per day  over baseline; hospitalization indicated, limiting self care ADL): Resolved 2/7/2023  ** Grade 2 diarrhea on 2/7/2023 (Increase of 4 - 6 stools per day  over baseline; limiting instrumental ADL) Resolved 2/8/2023  ** Grade 3 vomiting on 2/6/2023 and 2/7/2023 (hospitalization indicated) Resolved 2/8/2023  ** Grade 2 Myositis (Moderate pain associated with weakness; pain limiting, instrumental ADL): Present on admission on 2/7/2023  ** Grade 3 Hypoxemia (Decreased oxygen saturation at rest (e.g., pulse oximeter  <88% or PaO2 <=55 mm Hg), started 2/10/2023         Disposition: Pending resolution of fever, rising ANC, better management of myositis.       Discussed with nursing staff, Dr. Haddad.  Total time today approx 40 minutes.    ANN MARIE Rodriguez MD  Pediatric Hematology / Oncology  [facility]  Cell. [phone]  Office. [phone]

## 2023-02-17 ENCOUNTER — ANESTHESIA (OUTPATIENT)
Dept: SURGERY | Facility: MEDICAL CENTER | Age: 23
DRG: 500 | End: 2023-02-17
Payer: COMMERCIAL

## 2023-02-17 ENCOUNTER — ANESTHESIA EVENT (OUTPATIENT)
Dept: SURGERY | Facility: MEDICAL CENTER | Age: 23
DRG: 500 | End: 2023-02-17
Payer: COMMERCIAL

## 2023-02-17 PROBLEM — L08.9 SOFT TISSUE INFECTION: Status: ACTIVE | Noted: 2023-02-17

## 2023-02-17 LAB
ANISOCYTOSIS BLD QL SMEAR: ABNORMAL
BASOPHILS # BLD AUTO: 0 % (ref 0–1.8)
BASOPHILS # BLD: 0 K/UL (ref 0–0.12)
EOSINOPHIL # BLD AUTO: 0 K/UL (ref 0–0.51)
EOSINOPHIL NFR BLD: 0 % (ref 0–6.9)
ERYTHROCYTE [DISTWIDTH] IN BLOOD BY AUTOMATED COUNT: 51.6 FL (ref 35.9–50)
FUNGUS SPEC FUNGUS STN: NORMAL
FUNGUS SPEC FUNGUS STN: NORMAL
HCT VFR BLD AUTO: 26.3 % (ref 42–52)
HGB BLD-MCNC: 8.8 G/DL (ref 14–18)
HYPOCHROMIA BLD QL SMEAR: ABNORMAL
LYMPHOCYTES # BLD AUTO: 0.13 K/UL (ref 1–4.8)
LYMPHOCYTES NFR BLD: 5.3 % (ref 22–41)
MANUAL DIFF BLD: NORMAL
MCH RBC QN AUTO: 28.6 PG (ref 27–33)
MCHC RBC AUTO-ENTMCNC: 33.5 G/DL (ref 33.7–35.3)
MCV RBC AUTO: 85.4 FL (ref 81.4–97.8)
METAMYELOCYTES NFR BLD MANUAL: 1.7 %
MONOCYTES # BLD AUTO: 0.32 K/UL (ref 0–0.85)
MONOCYTES NFR BLD AUTO: 13.2 % (ref 0–13.4)
MORPHOLOGY BLD-IMP: NORMAL
MYELOCYTES NFR BLD MANUAL: 2.6 %
NEUTROPHILS # BLD AUTO: 1.85 K/UL (ref 1.82–7.42)
NEUTROPHILS NFR BLD: 77.2 % (ref 44–72)
NRBC # BLD AUTO: 0.03 K/UL
NRBC BLD-RTO: 1.3 /100 WBC
OVALOCYTES BLD QL SMEAR: NORMAL
PLATELET # BLD AUTO: 41 K/UL (ref 164–446)
PLATELET BLD QL SMEAR: NORMAL
PMV BLD AUTO: 9.3 FL (ref 9–12.9)
POIKILOCYTOSIS BLD QL SMEAR: NORMAL
RBC # BLD AUTO: 3.08 M/UL (ref 4.7–6.1)
RBC BLD AUTO: PRESENT
SIGNIFICANT IND 70042: NORMAL
SIGNIFICANT IND 70042: NORMAL
SITE SITE: NORMAL
SITE SITE: NORMAL
SOURCE SOURCE: NORMAL
SOURCE SOURCE: NORMAL
WBC # BLD AUTO: 2.4 K/UL (ref 4.8–10.8)

## 2023-02-17 PROCEDURE — 700111 HCHG RX REV CODE 636 W/ 250 OVERRIDE (IP): Performed by: PEDIATRICS

## 2023-02-17 PROCEDURE — 700102 HCHG RX REV CODE 250 W/ 637 OVERRIDE(OP): Performed by: PEDIATRICS

## 2023-02-17 PROCEDURE — 87102 FUNGUS ISOLATION CULTURE: CPT

## 2023-02-17 PROCEDURE — 87205 SMEAR GRAM STAIN: CPT | Mod: 91

## 2023-02-17 PROCEDURE — C1729 CATH, DRAINAGE: HCPCS | Performed by: ORTHOPAEDIC SURGERY

## 2023-02-17 PROCEDURE — 160009 HCHG ANES TIME/MIN: Performed by: ORTHOPAEDIC SURGERY

## 2023-02-17 PROCEDURE — 11043 DBRDMT MUSC&/FSCA 1ST 20/<: CPT | Mod: 59,51 | Performed by: ORTHOPAEDIC SURGERY

## 2023-02-17 PROCEDURE — 160035 HCHG PACU - 1ST 60 MINS PHASE I: Performed by: ORTHOPAEDIC SURGERY

## 2023-02-17 PROCEDURE — 160048 HCHG OR STATISTICAL LEVEL 1-5: Performed by: ORTHOPAEDIC SURGERY

## 2023-02-17 PROCEDURE — 87076 CULTURE ANAEROBE IDENT EACH: CPT

## 2023-02-17 PROCEDURE — 85025 COMPLETE CBC W/AUTO DIFF WBC: CPT

## 2023-02-17 PROCEDURE — 01610 ANES ALL PX NRV MUSC SHO&AX: CPT | Performed by: ANESTHESIOLOGY

## 2023-02-17 PROCEDURE — A9270 NON-COVERED ITEM OR SERVICE: HCPCS | Performed by: PEDIATRICS

## 2023-02-17 PROCEDURE — 160002 HCHG RECOVERY MINUTES (STAT): Performed by: ORTHOPAEDIC SURGERY

## 2023-02-17 PROCEDURE — 160027 HCHG SURGERY MINUTES - 1ST 30 MINS LEVEL 2: Performed by: ORTHOPAEDIC SURGERY

## 2023-02-17 PROCEDURE — 700101 HCHG RX REV CODE 250: Performed by: ANESTHESIOLOGY

## 2023-02-17 PROCEDURE — 700105 HCHG RX REV CODE 258: Performed by: PEDIATRICS

## 2023-02-17 PROCEDURE — 23040 ARTHRT GH JT EXPL/DRG/RMV FB: CPT | Mod: LT | Performed by: ORTHOPAEDIC SURGERY

## 2023-02-17 PROCEDURE — 85007 BL SMEAR W/DIFF WBC COUNT: CPT

## 2023-02-17 PROCEDURE — 700111 HCHG RX REV CODE 636 W/ 250 OVERRIDE (IP): Performed by: ANESTHESIOLOGY

## 2023-02-17 PROCEDURE — 700105 HCHG RX REV CODE 258: Performed by: ANESTHESIOLOGY

## 2023-02-17 PROCEDURE — 770003 HCHG ROOM/CARE - PEDIATRIC PRIVATE*

## 2023-02-17 PROCEDURE — 87075 CULTR BACTERIA EXCEPT BLOOD: CPT

## 2023-02-17 PROCEDURE — 0KB80ZZ EXCISION OF LEFT UPPER ARM MUSCLE, OPEN APPROACH: ICD-10-PCS | Performed by: ORTHOPAEDIC SURGERY

## 2023-02-17 PROCEDURE — 160038 HCHG SURGERY MINUTES - EA ADDL 1 MIN LEVEL 2: Performed by: ORTHOPAEDIC SURGERY

## 2023-02-17 PROCEDURE — 99232 SBSQ HOSP IP/OBS MODERATE 35: CPT | Performed by: PEDIATRICS

## 2023-02-17 PROCEDURE — 87070 CULTURE OTHR SPECIMN AEROBIC: CPT

## 2023-02-17 PROCEDURE — 23040 ARTHRT GH JT EXPL/DRG/RMV FB: CPT | Mod: AS,LT | Performed by: PHYSICIAN ASSISTANT

## 2023-02-17 PROCEDURE — 009U3ZZ DRAINAGE OF SPINAL CANAL, PERCUTANEOUS APPROACH: ICD-10-PCS | Performed by: PEDIATRICS

## 2023-02-17 RX ORDER — LABETALOL HYDROCHLORIDE 5 MG/ML
5 INJECTION, SOLUTION INTRAVENOUS
Status: DISCONTINUED | OUTPATIENT
Start: 2023-02-17 | End: 2023-02-17 | Stop reason: HOSPADM

## 2023-02-17 RX ORDER — HYDROMORPHONE HYDROCHLORIDE 2 MG/ML
INJECTION, SOLUTION INTRAMUSCULAR; INTRAVENOUS; SUBCUTANEOUS PRN
Status: DISCONTINUED | OUTPATIENT
Start: 2023-02-17 | End: 2023-02-17 | Stop reason: SURG

## 2023-02-17 RX ORDER — HALOPERIDOL 5 MG/ML
1 INJECTION INTRAMUSCULAR
Status: DISCONTINUED | OUTPATIENT
Start: 2023-02-17 | End: 2023-02-17 | Stop reason: HOSPADM

## 2023-02-17 RX ORDER — OXYCODONE HCL 5 MG/5 ML
10 SOLUTION, ORAL ORAL
Status: DISCONTINUED | OUTPATIENT
Start: 2023-02-17 | End: 2023-02-17 | Stop reason: HOSPADM

## 2023-02-17 RX ORDER — MIDAZOLAM HYDROCHLORIDE 1 MG/ML
1 INJECTION INTRAMUSCULAR; INTRAVENOUS
Status: DISCONTINUED | OUTPATIENT
Start: 2023-02-17 | End: 2023-02-17 | Stop reason: HOSPADM

## 2023-02-17 RX ORDER — HYDRALAZINE HYDROCHLORIDE 20 MG/ML
5 INJECTION INTRAMUSCULAR; INTRAVENOUS
Status: DISCONTINUED | OUTPATIENT
Start: 2023-02-17 | End: 2023-02-17 | Stop reason: HOSPADM

## 2023-02-17 RX ORDER — HYDROMORPHONE HYDROCHLORIDE 1 MG/ML
0.4 INJECTION, SOLUTION INTRAMUSCULAR; INTRAVENOUS; SUBCUTANEOUS
Status: DISCONTINUED | OUTPATIENT
Start: 2023-02-17 | End: 2023-02-17 | Stop reason: HOSPADM

## 2023-02-17 RX ORDER — DEXAMETHASONE SODIUM PHOSPHATE 4 MG/ML
INJECTION, SOLUTION INTRA-ARTICULAR; INTRALESIONAL; INTRAMUSCULAR; INTRAVENOUS; SOFT TISSUE PRN
Status: DISCONTINUED | OUTPATIENT
Start: 2023-02-17 | End: 2023-02-17 | Stop reason: SURG

## 2023-02-17 RX ORDER — HYDROMORPHONE HYDROCHLORIDE 1 MG/ML
0.2 INJECTION, SOLUTION INTRAMUSCULAR; INTRAVENOUS; SUBCUTANEOUS
Status: DISCONTINUED | OUTPATIENT
Start: 2023-02-17 | End: 2023-02-17 | Stop reason: HOSPADM

## 2023-02-17 RX ORDER — ONDANSETRON 2 MG/ML
INJECTION INTRAMUSCULAR; INTRAVENOUS PRN
Status: DISCONTINUED | OUTPATIENT
Start: 2023-02-17 | End: 2023-02-17 | Stop reason: SURG

## 2023-02-17 RX ORDER — ONDANSETRON 2 MG/ML
4 INJECTION INTRAMUSCULAR; INTRAVENOUS
Status: DISCONTINUED | OUTPATIENT
Start: 2023-02-17 | End: 2023-02-17 | Stop reason: HOSPADM

## 2023-02-17 RX ORDER — MIDAZOLAM HYDROCHLORIDE 1 MG/ML
INJECTION INTRAMUSCULAR; INTRAVENOUS PRN
Status: DISCONTINUED | OUTPATIENT
Start: 2023-02-17 | End: 2023-02-17 | Stop reason: SURG

## 2023-02-17 RX ORDER — SODIUM CHLORIDE, SODIUM LACTATE, POTASSIUM CHLORIDE, CALCIUM CHLORIDE 600; 310; 30; 20 MG/100ML; MG/100ML; MG/100ML; MG/100ML
INJECTION, SOLUTION INTRAVENOUS CONTINUOUS
Status: DISCONTINUED | OUTPATIENT
Start: 2023-02-17 | End: 2023-02-17 | Stop reason: HOSPADM

## 2023-02-17 RX ORDER — SODIUM CHLORIDE, SODIUM LACTATE, POTASSIUM CHLORIDE, CALCIUM CHLORIDE 600; 310; 30; 20 MG/100ML; MG/100ML; MG/100ML; MG/100ML
INJECTION, SOLUTION INTRAVENOUS
Status: DISCONTINUED | OUTPATIENT
Start: 2023-02-17 | End: 2023-02-17 | Stop reason: SURG

## 2023-02-17 RX ORDER — MEPERIDINE HYDROCHLORIDE 25 MG/ML
12.5 INJECTION INTRAMUSCULAR; INTRAVENOUS; SUBCUTANEOUS
Status: DISCONTINUED | OUTPATIENT
Start: 2023-02-17 | End: 2023-02-17 | Stop reason: HOSPADM

## 2023-02-17 RX ORDER — HYDROMORPHONE HYDROCHLORIDE 1 MG/ML
0.1 INJECTION, SOLUTION INTRAMUSCULAR; INTRAVENOUS; SUBCUTANEOUS
Status: DISCONTINUED | OUTPATIENT
Start: 2023-02-17 | End: 2023-02-17 | Stop reason: HOSPADM

## 2023-02-17 RX ORDER — OXYCODONE HCL 5 MG/5 ML
5 SOLUTION, ORAL ORAL
Status: DISCONTINUED | OUTPATIENT
Start: 2023-02-17 | End: 2023-02-17 | Stop reason: HOSPADM

## 2023-02-17 RX ORDER — LIDOCAINE HYDROCHLORIDE 20 MG/ML
INJECTION, SOLUTION EPIDURAL; INFILTRATION; INTRACAUDAL; PERINEURAL PRN
Status: DISCONTINUED | OUTPATIENT
Start: 2023-02-17 | End: 2023-02-17 | Stop reason: SURG

## 2023-02-17 RX ORDER — IPRATROPIUM BROMIDE AND ALBUTEROL SULFATE 2.5; .5 MG/3ML; MG/3ML
3 SOLUTION RESPIRATORY (INHALATION)
Status: DISCONTINUED | OUTPATIENT
Start: 2023-02-17 | End: 2023-02-17 | Stop reason: HOSPADM

## 2023-02-17 RX ADMIN — CHLORHEXIDINE GLUCONATE 0.12% ORAL RINSE 15 ML: 1.2 LIQUID ORAL at 17:27

## 2023-02-17 RX ADMIN — IMATINIB MESYLATE 200 MG: 100 TABLET, FILM COATED ORAL at 06:05

## 2023-02-17 RX ADMIN — CEFEPIME 2 G: 2 INJECTION, POWDER, FOR SOLUTION INTRAVENOUS at 08:08

## 2023-02-17 RX ADMIN — ONDANSETRON 8 MG: 2 INJECTION INTRAMUSCULAR; INTRAVENOUS at 17:27

## 2023-02-17 RX ADMIN — CHLORHEXIDINE GLUCONATE 0.12% ORAL RINSE 15 ML: 1.2 LIQUID ORAL at 08:05

## 2023-02-17 RX ADMIN — PROPOFOL 200 MG: 10 INJECTION, EMULSION INTRAVENOUS at 11:34

## 2023-02-17 RX ADMIN — FAMOTIDINE 20 MG: 20 TABLET ORAL at 18:57

## 2023-02-17 RX ADMIN — ACETAMINOPHEN 650 MG: 325 TABLET, FILM COATED ORAL at 18:57

## 2023-02-17 RX ADMIN — HYDROMORPHONE HYDROCHLORIDE: 10 INJECTION, SOLUTION INTRAMUSCULAR; INTRAVENOUS; SUBCUTANEOUS at 15:23

## 2023-02-17 RX ADMIN — ACETAMINOPHEN 650 MG: 325 TABLET, FILM COATED ORAL at 06:04

## 2023-02-17 RX ADMIN — ONDANSETRON 8 MG: 2 INJECTION INTRAMUSCULAR; INTRAVENOUS at 00:21

## 2023-02-17 RX ADMIN — VANCOMYCIN HYDROCHLORIDE 1000 MG: 500 INJECTION, POWDER, LYOPHILIZED, FOR SOLUTION INTRAVENOUS at 20:33

## 2023-02-17 RX ADMIN — SENNOSIDES AND DOCUSATE SODIUM 1 TABLET: 50; 8.6 TABLET ORAL at 06:05

## 2023-02-17 RX ADMIN — VANCOMYCIN HYDROCHLORIDE 1000 MG: 500 INJECTION, POWDER, LYOPHILIZED, FOR SOLUTION INTRAVENOUS at 09:35

## 2023-02-17 RX ADMIN — LIDOCAINE HYDROCHLORIDE 100 MG: 20 INJECTION, SOLUTION EPIDURAL; INFILTRATION; INTRACAUDAL at 11:34

## 2023-02-17 RX ADMIN — Medication 1000 UNITS: at 06:05

## 2023-02-17 RX ADMIN — DEXAMETHASONE SODIUM PHOSPHATE 4 MG: 4 INJECTION, SOLUTION INTRA-ARTICULAR; INTRALESIONAL; INTRAMUSCULAR; INTRAVENOUS; SOFT TISSUE at 11:34

## 2023-02-17 RX ADMIN — MIDAZOLAM HYDROCHLORIDE 2 MG: 1 INJECTION, SOLUTION INTRAMUSCULAR; INTRAVENOUS at 11:24

## 2023-02-17 RX ADMIN — IMATINIB MESYLATE 400 MG: 400 TABLET, FILM COATED ORAL at 06:05

## 2023-02-17 RX ADMIN — DEXTROSE AND SODIUM CHLORIDE: 5; 450 INJECTION, SOLUTION INTRAVENOUS at 17:12

## 2023-02-17 RX ADMIN — FENTANYL CITRATE 100 MCG: 50 INJECTION, SOLUTION INTRAMUSCULAR; INTRAVENOUS at 11:34

## 2023-02-17 RX ADMIN — FAMOTIDINE 20 MG: 20 TABLET ORAL at 06:05

## 2023-02-17 RX ADMIN — HYDROMORPHONE HYDROCHLORIDE 2 MG: 2 INJECTION INTRAMUSCULAR; INTRAVENOUS; SUBCUTANEOUS at 11:46

## 2023-02-17 RX ADMIN — VANCOMYCIN HYDROCHLORIDE 1000 MG: 500 INJECTION, POWDER, LYOPHILIZED, FOR SOLUTION INTRAVENOUS at 02:15

## 2023-02-17 RX ADMIN — SODIUM CHLORIDE, POTASSIUM CHLORIDE, SODIUM LACTATE AND CALCIUM CHLORIDE: 600; 310; 30; 20 INJECTION, SOLUTION INTRAVENOUS at 11:24

## 2023-02-17 RX ADMIN — ONDANSETRON 8 MG: 2 INJECTION INTRAMUSCULAR; INTRAVENOUS at 08:05

## 2023-02-17 RX ADMIN — ONDANSETRON 4 MG: 2 INJECTION INTRAMUSCULAR; INTRAVENOUS at 11:34

## 2023-02-17 RX ADMIN — CEFEPIME 2 G: 2 INJECTION, POWDER, FOR SOLUTION INTRAVENOUS at 00:23

## 2023-02-17 RX ADMIN — HYDROMORPHONE HYDROCHLORIDE: 10 INJECTION, SOLUTION INTRAMUSCULAR; INTRAVENOUS; SUBCUTANEOUS at 00:06

## 2023-02-17 RX ADMIN — ACETAMINOPHEN 650 MG: 325 TABLET, FILM COATED ORAL at 00:21

## 2023-02-17 RX ADMIN — CEFEPIME 2 G: 2 INJECTION, POWDER, FOR SOLUTION INTRAVENOUS at 17:16

## 2023-02-17 ASSESSMENT — PAIN DESCRIPTION - PAIN TYPE
TYPE: SURGICAL PAIN
TYPE: ACUTE PAIN
TYPE: ACUTE PAIN
TYPE: SURGICAL PAIN
TYPE: ACUTE PAIN
TYPE: SURGICAL PAIN
TYPE: SURGICAL PAIN

## 2023-02-17 ASSESSMENT — PAIN SCALES - GENERAL: PAIN_LEVEL: 0

## 2023-02-17 ASSESSMENT — FIBROSIS 4 INDEX: FIB4 SCORE: 1.81

## 2023-02-17 NOTE — OR SURGEON
Immediate Post OP Note    PreOp Diagnosis: Infection left shoulder    PostOp Diagnosis: Infection left shoulder    Procedure(s):  INCISION AND DRAINAGE OF LEFT SHOULDER - Wound Class: Dirty or Infected    Surgeon(s):  Luke Haddad M.D.    Assistant: Marifer Roy PA-C- Assisted in all aspects of procedure    Anesthesiologist/Type of Anesthesia:  Anesthesiologist: Vinny Lincoln M.D./General    Surgical Staff:  Circulator: Mely Haile R.N.  Relief Circulator: Phylicia Ferguson R.N.  Relief Scrub: Brian Keller  Scrub Person: Saeed Yeager  First Assist: Marifer Roy P.A.-C.    Specimens removed if any:  ID Type Source Tests Collected by Time Destination   1 : left shoulder superficial Other Other FUNGAL CULTURE, AEROBIC/ANAEROBIC CULTURE (SURGERY) Luke Haddad M.D. 2/17/2023 12:01 PM    2 : left shoulder deep Other Other FUNGAL CULTURE, AEROBIC/ANAEROBIC CULTURE (SURGERY) Luke Haddad M.D. 2/17/2023 12:01 PM        Estimated Blood Loss: Minimal    Findings: Same    Complications: None        2/17/2023 12:44 PM Marifer Roy P.A.-C.

## 2023-02-17 NOTE — PROGRESS NOTES
2255:  Report given to Mohit,RN.  Patient transferred to S421 on 1L NC.  Color + pink.  Rt chest port infusing IVF's and Dilaudid PCA w/o difficulty.  Patient in no distress, presently.

## 2023-02-17 NOTE — PROGRESS NOTES
Pt demonstrates ability to turn self in bed without assistance of staff. Patient and family understands importance in prevention of skin breakdown, ulcers, and potential infection. Hourly rounding in effect. RN skin check complete.   Devices in place include: Nasal cannula, pulse ox, BP cuff, RT anterior chest port  Skin assessed under devices: Yes.  Confirmed HAPI identified on the following date: N/A    Location of HAPI: N/A .  Wound Care RN following: No.  The following interventions are in place: Q2 hourly rounding.including skin assessments, encourage repostioning IS while awake..

## 2023-02-17 NOTE — PROGRESS NOTES
This RN spoke with Dr. Rodriguez regarding the pt's NPO status and 0600 medications. Per MD jon to give pt medications with sips of water. Diet order modified by this RN.

## 2023-02-17 NOTE — ANESTHESIA PREPROCEDURE EVALUATION
Case: [id number] Date/Time: 02/17/23 1015    Procedure: INCISION AND DRAINAGE OF LEFT SHOULDER (Left)    Location: [facility] OR 06 / SURGERY [facility]    Surgeons: Luke Haddad M.D.      Infected shoulder, left.     Neutropenic fever and immunocompromised stated due to chemotherapy for ALL.     Denies problems with prior anesthetics.     NPO.     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    Pertinent diagnostic labs and testing reviewed    Informed Consent:    Anesthetic plan and risks discussed with patient.    Use of blood products discussed with: patient whom consented to blood products.

## 2023-02-17 NOTE — CARE PLAN
The patient is Stable - Low risk of patient condition declining or worsening    Shift Goals  Clinical Goals: Keep JULY comfortable.  Monitor VS.  LT arm circumference Q12hrs.  Encourage IS.  NPO at midnight, an increase IVF's at that time.Transfer to peds.  Patient Goals: Utilize PCA for comfort.  Use IS when awake.  Rest.  Family Goals: Cont. to update family re:  POC.    Progress made toward(s) clinical / shift goals:  Progressing    Problem: Self Care  Goal: Patient will have the ability to perform ADLs independently or with assistance (bathe, groom, dress, toilet and feed)  Outcome: Progressing     Problem: Pain - Standard  Goal: Alleviation of pain or a reduction in pain to the patient’s comfort goal  Outcome: Progressing     Problem: Skin Integrity  Goal: Skin integrity is maintained or improved  Outcome: Progressing

## 2023-02-17 NOTE — ANESTHESIA POSTPROCEDURE EVALUATION
Patient: Tomas Jean-Baptiste    Procedure Summary     Date: 02/17/23 Room / Location: [facility] 06 / SURGERY [facility]    Anesthesia Start: 1124 Anesthesia Stop: 1248    Procedure: INCISION AND DRAINAGE OF LEFT SHOULDER (Left: Shoulder) Diagnosis: (left shoulder infection)    Surgeons: Luke Haddad M.D. Responsible Provider: Vinny Lincoln M.D.    Anesthesia Type: general ASA Status: 3          Final Anesthesia Type: general  Last vitals  BP   Blood Pressure: 129/67    Temp   36.3 °C (97.3 °F)    Pulse   93   Resp   16    SpO2   96 %      Anesthesia Post Evaluation    Patient location during evaluation: PACU  Patient participation: complete - patient participated  Level of consciousness: sleepy but conscious  Pain score: 0    Airway patency: patent  Anesthetic complications: no  Cardiovascular status: hemodynamically stable  Respiratory status: acceptable  Hydration status: euvolemic    PONV: none          No notable events documented.     Nurse Pain Score: 5 (NPRS)

## 2023-02-17 NOTE — PROGRESS NOTES
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  Dr Haddad is planning surgery (drainage) but was unable to arrange this today; now scheduled for tomorrow morning.    Review of Systems:     Constitutional: Afebrile. \"Some\" appetite.  HENT: Negative for ear pain, nosebleeds, congestion, rhinorrhea, sore throat, mouth sores.  Respiratory: Negative for shortness of breath or cough.    Gastrointestinal: Negative for nausea, vomiting, abdominal pain, diarrhea, constipation.    Skin: Negative for rash or skin infection..  Neurological: Negative for numbness, tingling, sensory changes, weakness or headaches.    Endo/Heme/Allergies: No bruising/bleeding easily.    Psychiatric/Behavioral: Distressed over pain, delay in surgery    Medications:    Current Facility-Administered Medications:     HYDROmorphone (DILAUDID) 0.2 mg/mL in 50 mL NS (PCA), , Intravenous, Continuous, River Rodriguez M.D., Rate Verify at 23 1901    heparin lock flush 100 unit/mL injection 300-500 Units, " 300-500 Units, Intracatheter, PRN, River Rodriguez M.D., 500 Units at 02/14/23 1648    vancomycin (VANCOCIN) 1,000 mg in  mL IVPB, 1,000 mg, Intravenous, Q8HR, River Rodriguez M.D., Last Rate: 125 mL/hr at 02/16/23 1829, 1,000 mg at 02/16/23 1829    Pharmacy Consult Request ...Pain Management Review 1 Each, 1 Each, Other, PHARMACY TO DOSE, River Rodriguez M.D.    acetaminophen (Tylenol) tablet 650 mg, 650 mg, Oral, Q6HRS, 650 mg at 02/16/23 1828 **FOLLOWED BY** [START ON 2/18/2023] acetaminophen (Tylenol) tablet 650 mg, 650 mg, Oral, Q6HRS PRN, River Rodriguez M.D.    senna-docusate (PERICOLACE or SENOKOT S) 8.6-50 MG per tablet 1 Tablet, 1 Tablet, Oral, DAILY, Alyce Duenas M.D., 1 Tablet at 02/16/23 0600    lidocaine (LIDODERM) 5 % 1 Patch, 1 Patch, Transdermal, Q24HRS PRN, Alyce Duenas M.D.    MD Alert...Vancomycin per Pharmacy, , Other, PHARMACY TO DOSE, River Rodriguez M.D.    LORazepam (ATIVAN) injection 0.5 mg, 0.5 mg, Intravenous, Q6HRS PRN, Alyce Duenas M.D.    lidocaine-prilocaine (EMLA) 2.5-2.5 % cream, , Topical, PRN, Alyce Duenas M.D.    D5 1/2 NS infusion, , Intravenous, Continuous, Alyce Duenas M.D., Last Rate: 50 mL/hr at 02/16/23 1511, Restarted at 02/16/23 1511    cefepime (Maxipime) 2 g in  mL IVPB, 2 g, Intravenous, Q8HRS, River Rodriguez M.D., Stopped at 02/16/23 1710    ondansetron (ZOFRAN) syringe/vial injection 8 mg, 8 mg, Intravenous, Q8HRS, Alyce Duenas M.D., 8 mg at 02/16/23 1633    famotidine (PEPCID) tablet 20 mg, 20 mg, Oral, BID, Alyce Duenas M.D., 20 mg at 02/16/23 1828    chlorhexidine (PERIDEX) 0.12 % solution 15 mL, 15 mL, Mouth/Throat, 4X/DAY, Alyce Duenas M.D., 15 mL at 02/16/23 1634    imatinib (Gleevec) tablet 200 mg, 200 mg, Oral, DAILY, Alyce Duenas M.D., 200 mg at 02/16/23 0558    imatinib (Gleevec) tablet 400 mg, 400 mg, Oral, DAILY, Alyce Duenas M.D., 400 mg at 02/16/23 0558    therapeutic multivitamin-minerals  "(THERAGRAN-M) tablet 1 Tablet, 1 Tablet, Oral, DAILY, Alyce Duenas M.D., 1 Tablet at 23 0600    vitamin D3 (cholecalciferol) tablet 1,000 Units, 1,000 Units, Oral, DAILY, Alyce Duenas M.D., 1,000 Units at 23 0600    OBJECTIVE:     Max Temp: Temp (24hrs), Av.8 °C (98.2 °F), Min:36.2 °C (97.2 °F), Max:37.4 °C (99.3 °F)      Vitals: /65   Pulse (!) 106   Temp 36.6 °C (97.8 °F) (Temporal)   Resp 14   Ht 1.651 m (5' 5\")   Wt 60.8 kg (134 lb 0.6 oz)   SpO2 97%   BMI 22.31 kg/m²       Intake/Output Summary (Last 24 hours) at 2023 1913  Last data filed at 2023 1829  Gross per 24 hour   Intake 2383.23 ml   Output 2950 ml   Net -566.77 ml       Labs:   Latest Reference Range & Units 02/15/23 05:05 23 04:15   WBC 4.8 - 10.8 K/uL 1.5 (LL) 1.7 (LL)   RBC 4.70 - 6.10 M/uL 2.61 (L) 2.58 (L)   Hemoglobin 14.0 - 18.0 g/dL 7.3 (L) 7.3 (L)   Hematocrit 42.0 - 52.0 % 22.0 (L) 22.6 (L)   MCV 81.4 - 97.8 fL 84.3 87.6   MCH 27.0 - 33.0 pg 28.0 28.3   MCHC 33.7 - 35.3 g/dL 33.2 (L) 32.3 (L)   RDW 35.9 - 50.0 fL 48.6 51.7 (H)   Platelet Count 164 - 446 K/uL 36 (L) 44 (L)   MPV 9.0 - 12.9 fL 12.0 10.9   Neutrophils-Polys 44.00 - 72.00 % 52.50 40.20 (L)   Neutrophils (Absolute) 1.82 - 7.42 K/uL 0.79 (L) 0.73 (L)   Bands-Stabs 0.00 - 10.00 %  2.60   Lymphocytes 22.00 - 41.00 % 22.00 22.20   Lymphs (Absolute) 1.00 - 4.80 K/uL 0.33 (L) 0.38 (L)   Monocytes 0.00 - 13.40 % 24.60 (H) 32.50 (H)   Metamyelocytes %  0.80   Myelocytes % 0.90 1.70   Nucleated RBC /100 WBC 1.40 1.10       Physical Exam:    Constitutional: Sleepy but responsive; in pain but \"acceptable\"   HENT: Normocephalic and atraumatic. Alopecia.  No rhinorrhea. Oropharynx is clear and moist.   Eyes: Conjunctivae are normal. No scleral icterus.   Neck: Supple, no adenopathy.    Cardiovascular: RRR with 3/6 pulmonic flow murmur.  Pulmonary/Chest: Effort normal. Symmetric expansion.  Clear to auscultation bilaterally.  Abdomen: Soft. Bowel " "sounds are normal. No distension, organomegaly or mass.     Genitourinary:  Deferred  Musculoskeletal: Left shoulder/arm swelling as before.  Skin: Skin is warm, dry and pink.  No rash or evidence of skin infection. Port site clean and dry, accessed.       ASSESSMENT AND PLAN:     Tomas \"UJLY\" Estiven  is a 22 y.o. male who presented to the [facility] for Day 50 chemotherapy with Vincristine on 2/7/2023. During that appointment he was noted to be febrile with chills and dehydrated. Hence, decision made to admit him to pediatric ott for management of febrile neutropenia and dehydration.     Counts trending up. Orthopedic surgeon following patient; overnight, there has been fairly dramatic swelling of the left upper extremity with significantly increased pain.  JULY continues to be afebrile.  Patient's nausea resolved. No emesis. Hypoxia when deep sleep, likely also due to atelectasis. On 0.5 L oxygen via NC.     Febrile Neutropenia in an immunocompromised host: Etiology not clear: Counts trending up slowly. Afebrile.   - Previous history of gram negative septic shock  - Blood culture from port 2/7/2023: NGTD  - Repeat blood culture from port 2/9/2023: NGTD  - IV Cefepime every 8 hrs  - IV Vancomycin started on 2/10/2023, 1250 mg every 12 hrs     Left shoulder pain: Myositis/intermuscular edema/fluid  - Edema and pain are more extensive since yesterday  - X-ray L shoulder 2/8/2023: not concerning  - MRI shoulder 2/10/2023: showing diffuse myositis and some intermuscular edema/fluid  - No evidence of DVT  - This may simply reflect recovery of neutrophils BUT etiology of inflammation is unclear  - JULY is more receptive to PCA Dilaudid  - Added IV Vancomycin on 2/10/2023  - CT with contrast shows probable abscess and shoulder joint effusion; to OR in AM     Tachycardia: orthostatic vs. cardiac issues  - Some PVCs noted on tele but hemodynamically stable  - Recently " admitted with severe septic shock, hence consider cardiomyopathy  - Consulted cardiology (Dr Galvez): Holter monitor and repeat echo essentially unremarkable     Constipation secondary to opioid use:  - Start senna-docusate BID  - Encouraged ambulation as tolerated.  - BM yesterday     Retrosternal chest pain : likely from LLOYD, improved  - Continue famotidine 20 mg BID  - Continue to monitor     Hypoxemia: likely from opioids/atelectasis  - 85-86% with deep sleep  - Requiring 0.5 L oxygen at night/ even in am  - IVF at 50 ml/hr given patient on vancomycin, encourage ambulation as tolerated  - Ordered incentive spirometry  - Increased oxygen requirement today likely due to neutrophil recovery?  - If continues, will get CXR to r/o pleural effusion/edema  - Consider lasix IV x 1 (but most recent weight was DOWN from last week)     Ph+ Precursor B-Cell Acute Lymphoblastic Leukemia, in MRD Remission:  - 2-3 weeks of symptoms  - Presenting WBC > 440 k/uL, hyperleukocytosis  - Start of Hydroxyurea (1500 mg PO Q8) 2320 on 5/27/2022  - discontinued after only 55 hours  - No steroid pretreatment  - 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  - Testicular status NEGATIVE     - Flow cytometry of both 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, FISH positive for BCR-ABL1 translocation  - Enrolled on [facility] [id number]  - Initially enrolled on [facility] [id number] - but 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     - Enrolled on [facility] [id number] and began study 6/13/2022  - Started imatinib therapy 6/3/2022  - End of Induction IA and IB MRD negative  - Imatinib dose increased to 400 mg PO AM and 250 mg PM 9/29/2022  * Note:  All imatinib doses given inpatient rounded down to 600 mg        - Imatinib held for Septic Shock 12/27/2022-1/8/2023        - Imatinib 600 mg PO daily restarted 1/8/2023 inpatient        - Imatinib 400 mg PO QAM and 250 mg PO QHS 1/14/2023-1/17/2023        - Imatinib 600 mg PO QAM  "1/17/2023 - present     BRYANNA, AR Arm B, Delayed Intensification: COMPLETE   ** \"Interim Maintenance\" ON HOLD pending improvement  ** Continue imatinib 600 mg PO daily                     Chemotherapy-related Pancytopenia:  - Transfuse for hemoglobin less than 7 gm/dL or with symptoms  - Transfuse for platelets less than 10,000/microliters or with symptoms  - Transfused pRBVCs today in anticipation of anesthesia tomorrow  - Platelets ON HOLD for OR tomorrow  - CBC daily: ANC stable, >500; plts stable     At risk for deep vein thrombosis due to pegaspargase:  - Platelets still low  - Will hold apixaban  - Ordered SCD, encourage ambulation as tolerated     At Risk of Opportunistic Lung Infection:  - Bactrim DS PO BID Sat and Sun for PJP prophylaxis     Central Access:   - R Port-A-Cath in place      Research Participant:           [facility] - Source Data         Diagnosis: Ph+ Precursor B-Cell Acute Lymphoblastic Leukemia     Disease Status: EOI1A MRD NEGATIVE, EOI1B RD NEGATIVE, CNS3c, testicular negative, HSV1 IgG POSITIVE, CMV IgG NEGATIVE, VARICELLA IgG POSITIVE     Active Studies: [id number], [id number]                                                                                                      Inactive Studies: [id number]                                                                                                                                                Optional Studies: None             Protocol: International Phase 3 trial in [place] 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      Treatment Plan: [id number](OS), AR-Arm B, Delayed Intensification, Day 59     Height: 1.650 m      Weight: 64.2kg       BSA: 1.72 m²   (Delayed Intensification Day 29 1/17/2023)                                                                                                                               "             Performance Status: Karnofsky 70, ECOG  2 (1/31/2023)     Treatment Plan Medications:  (100% adherent with imatinib)     Imatinib dose adjusted for weight at DI, Day 29 to Imatinib 600 mg PO daily in AM     Evaluations / Study Labs:  (2/12/2023) : None     Therapy Given: (2/12/2023):  Imatinib 600 mg PO QAM     Toxicities:  **Grade 3 febrile neutropenia on 2/7/2023 (ANC <1000/mm3 with a single temperature of >38.3 degrees C (101 degrees F) or a sustained temperature of >=38 degrees C (100.4 degrees F) for more than one hour)  ** Grade 3 diarrhea on 2/6/2023 (Increase of >=7 stools per day  over baseline; hospitalization indicated, limiting self care ADL): Resolved 2/7/2023  ** Grade 2 diarrhea on 2/7/2023 (Increase of 4 - 6 stools per day  over baseline; limiting instrumental ADL) Resolved 2/8/2023  ** Grade 3 vomiting on 2/6/2023 and 2/7/2023 (hospitalization indicated) Resolved 2/8/2023  ** Grade 2 Myositis (Moderate pain associated with weakness; pain limiting, instrumental ADL): Present on admission on 2/7/2023  ** Grade 3 Hypoxemia (Decreased oxygen saturation at rest (e.g., pulse oximeter  <88% or PaO2 <=55 mm Hg), started 2/10/2023         Disposition: Pending resolution of fever, rising ANC, better management of myositis.         Discussed with nursing staff, Dr. Haddad.  Total time today approx 50 minutes.      ANN MARIE Rodriguez MD  Pediatric Hematology / Oncology  [facility]  Cell. [phone]  Office. [phone]

## 2023-02-17 NOTE — OP REPORT
OPERATIVE NOTE    Patient: Tomas Jean-Baptiste    YOB: 2000    Date of Procedure: 2/17/2023    Pre-Operative Diagnosis:  1. Left septic shoulder  2. Left arm pyomyositis / abscess    Post-Operative Diagnosis:  1. Left shoulder reactive effusion  2. Left arm pyomyositis / abscess     Procedure:  1. Irrigation and excisional debridement left arm of skin, subcutaneous tissue, muscle & fascia (9 sq cm)  2. Left shoulder arthrotomy with exploration of left shoulder joint with irrigation and non-excisional debridement    Surgeon: Luke Haddad III, MD    Assistant(s): Marifer Roy PA-C: assisted in all aspects of procedure    Anesthesia: General + local    Fluids: see anesthesia record    Estimated Blood Loss: 25 mL    Specimen: None    Condition: Stable    Indications for Procedure:  JULY is a 22 y.o. male with a history of B-cell ALL on chemotherapy treatment who was admitted for neutropenic fever. Over the course of his hospitalization, he developed left shoulder pain. Initial MRI demonstrated diffuse edema and myostitis without a focal collection. As his myelosuppression improved, his clinical exam worsened. Repeat CT with contrast showed a folcal collection with possible septic arthritis. In discussion with the Hematology / Oncology service, we elected to perform surgical debridement as he was worsening despite IV antibiotics. Risks, benefits, and alternatives to conservative and surgical management were discussed, informed consent was obtained and all questions were answered.    Description of Procedure:  JULY was met in the pre-operative holding area. The left arm was marked and the patient was taken back to OR #6 at the [facility]. The patient was placed supine on the OR table and general anesthesia was performed. A timeout was performed to ensure correct patient and operative site. He has been on his regularly scheduled IV antibiotics, so no additional antibiotics were  administered prior to starting the procedure.    The left arm was then prepped and draped in the normal sterile fashion. A deltopectoral approach was used to gain access to the axillary pyomyositis and left shoulder joint. We encountered guero pus in the upper arm muscle. Cultures were taken. Necrotic, non-viable tissue was excised down to the level of the muscle using scissors and scalpel. Once we irrigated this area, we identified the joint capsule and entered it sharply. Turbid fluid was encountered, but no guero pus. The joint was irrigated. Once satisfied with the operative field, a Penrose drain was placed and a layered closure was performed with 2-0 PDS & 3-0 Monocryl. 10 cc of 0.5% Marcaine with epinephrine was infiltrated in the wound. It was dressed with Steri-strips, Adaptic, 4 x 4's, and Tegaderm.     The drapes were then removed, the patient was extubated, placed on the stretcher and transferred to the PACU in stable condition. I was present for the entire procedure and all sponge and needle counts were correct. This was a contaminated procedure and the incision was primarily closed over a drain.    Post-Operative Plan:  Tomas Roy will return to the PICU under the management of the [facility]On[facility] service. We will discontinue the drain in 2-3 days. He may use his left shoulder, but no heavy lifting. He will be continued on his current antibiotics regiment pending cultures. We will follow along while he is an inpatient. He will need a wound check in 2-3 weeks after surgery if he is discharged.    If there are any concerns, they will call.    Luke Haddad III, MD  Pediatric Orthopedics & Scoliosis

## 2023-02-17 NOTE — ANESTHESIA PROCEDURE NOTES
Airway    Date/Time: 2/17/2023 11:36 AM  Performed by: Vinny Lincoln M.D.  Authorized by: Vinny Lincoln M.D.     Location:  OR  Urgency:  Elective  Indications for Airway Management:  Anesthesia      Spontaneous Ventilation: absent    Sedation Level:  Deep  Preoxygenated: Yes    Mask Difficulty Assessment:  0 - not attempted  Final Airway Type:  Supraglottic airway  Final Supraglottic Airway:  Standard LMA    SGA Size:  4  Number of Attempts at Approach:  1

## 2023-02-17 NOTE — ANESTHESIA TIME REPORT
Anesthesia Start and Stop Event Times     Date Time Event    2/17/2023 1102 Ready for Procedure     1124 Anesthesia Start     1248 Anesthesia Stop        Responsible Staff  02/17/23    Name Role Begin End    Vinny Lincoln M.D. Anesth 1124 1248        Overtime Reason:  no overtime (within assigned shift)    Comments:

## 2023-02-18 LAB
ALBUMIN SERPL BCP-MCNC: 2.3 G/DL (ref 3.2–4.9)
ALBUMIN/GLOB SERPL: 1 G/DL
ALP SERPL-CCNC: 129 U/L (ref 30–99)
ALT SERPL-CCNC: 16 U/L (ref 2–50)
ANION GAP SERPL CALC-SCNC: 8 MMOL/L (ref 7–16)
ANISOCYTOSIS BLD QL SMEAR: ABNORMAL
AST SERPL-CCNC: 19 U/L (ref 12–45)
BASOPHILS # BLD AUTO: 0 % (ref 0–1.8)
BASOPHILS # BLD: 0 K/UL (ref 0–0.12)
BILIRUB SERPL-MCNC: 0.4 MG/DL (ref 0.1–1.5)
BUN SERPL-MCNC: 6 MG/DL (ref 8–22)
CALCIUM ALBUM COR SERPL-MCNC: 8.5 MG/DL (ref 8.5–10.5)
CALCIUM SERPL-MCNC: 7.1 MG/DL (ref 8.5–10.5)
CHLORIDE SERPL-SCNC: 105 MMOL/L (ref 96–112)
CO2 SERPL-SCNC: 24 MMOL/L (ref 20–33)
CREAT SERPL-MCNC: 0.26 MG/DL (ref 0.5–1.4)
EOSINOPHIL # BLD AUTO: 0 K/UL (ref 0–0.51)
EOSINOPHIL NFR BLD: 0 % (ref 0–6.9)
ERYTHROCYTE [DISTWIDTH] IN BLOOD BY AUTOMATED COUNT: 50.8 FL (ref 35.9–50)
GFR SERPLBLD CREATININE-BSD FMLA CKD-EPI: 181 ML/MIN/1.73 M 2
GLOBULIN SER CALC-MCNC: 2.3 G/DL (ref 1.9–3.5)
GLUCOSE SERPL-MCNC: 110 MG/DL (ref 65–99)
GRAM STN SPEC: NORMAL
GRAM STN SPEC: NORMAL
HCT VFR BLD AUTO: 26.2 % (ref 42–52)
HGB BLD-MCNC: 8.6 G/DL (ref 14–18)
HYPOCHROMIA BLD QL SMEAR: ABNORMAL
LYMPHOCYTES # BLD AUTO: 0.23 K/UL (ref 1–4.8)
LYMPHOCYTES NFR BLD: 4.3 % (ref 22–41)
MANUAL DIFF BLD: NORMAL
MCH RBC QN AUTO: 28.6 PG (ref 27–33)
MCHC RBC AUTO-ENTMCNC: 32.8 G/DL (ref 33.7–35.3)
MCV RBC AUTO: 87 FL (ref 81.4–97.8)
METAMYELOCYTES NFR BLD MANUAL: 0.9 %
MICROCYTES BLD QL SMEAR: ABNORMAL
MONOCYTES # BLD AUTO: 0.56 K/UL (ref 0–0.85)
MONOCYTES NFR BLD AUTO: 10.3 % (ref 0–13.4)
MORPHOLOGY BLD-IMP: NORMAL
NEUTROPHILS # BLD AUTO: 4.56 K/UL (ref 1.82–7.42)
NEUTROPHILS NFR BLD: 82.8 % (ref 44–72)
NEUTS BAND NFR BLD MANUAL: 1.7 % (ref 0–10)
NRBC # BLD AUTO: 0.02 K/UL
NRBC BLD-RTO: 0.4 /100 WBC
PLATELET # BLD AUTO: 56 K/UL (ref 164–446)
PLATELET BLD QL SMEAR: NORMAL
PMV BLD AUTO: 11.1 FL (ref 9–12.9)
POTASSIUM SERPL-SCNC: 3.7 MMOL/L (ref 3.6–5.5)
PROT SERPL-MCNC: 4.6 G/DL (ref 6–8.2)
RBC # BLD AUTO: 3.01 M/UL (ref 4.7–6.1)
RBC BLD AUTO: PRESENT
SIGNIFICANT IND 70042: NORMAL
SIGNIFICANT IND 70042: NORMAL
SITE SITE: NORMAL
SITE SITE: NORMAL
SODIUM SERPL-SCNC: 137 MMOL/L (ref 135–145)
SOURCE SOURCE: NORMAL
SOURCE SOURCE: NORMAL
WBC # BLD AUTO: 5.4 K/UL (ref 4.8–10.8)

## 2023-02-18 PROCEDURE — 770003 HCHG ROOM/CARE - PEDIATRIC PRIVATE*

## 2023-02-18 PROCEDURE — 85025 COMPLETE CBC W/AUTO DIFF WBC: CPT

## 2023-02-18 PROCEDURE — 700102 HCHG RX REV CODE 250 W/ 637 OVERRIDE(OP): Performed by: PEDIATRICS

## 2023-02-18 PROCEDURE — 700105 HCHG RX REV CODE 258: Performed by: PEDIATRICS

## 2023-02-18 PROCEDURE — 700111 HCHG RX REV CODE 636 W/ 250 OVERRIDE (IP): Performed by: PEDIATRICS

## 2023-02-18 PROCEDURE — 99232 SBSQ HOSP IP/OBS MODERATE 35: CPT | Performed by: PEDIATRICS

## 2023-02-18 PROCEDURE — 85007 BL SMEAR W/DIFF WBC COUNT: CPT

## 2023-02-18 PROCEDURE — A9270 NON-COVERED ITEM OR SERVICE: HCPCS | Performed by: PEDIATRICS

## 2023-02-18 PROCEDURE — 99024 POSTOP FOLLOW-UP VISIT: CPT | Performed by: PHYSICIAN ASSISTANT

## 2023-02-18 PROCEDURE — 80053 COMPREHEN METABOLIC PANEL: CPT

## 2023-02-18 RX ADMIN — Medication 1000 UNITS: at 06:21

## 2023-02-18 RX ADMIN — IMATINIB MESYLATE 400 MG: 400 TABLET, FILM COATED ORAL at 06:23

## 2023-02-18 RX ADMIN — HYDROMORPHONE HYDROCHLORIDE: 10 INJECTION, SOLUTION INTRAMUSCULAR; INTRAVENOUS; SUBCUTANEOUS at 01:20

## 2023-02-18 RX ADMIN — FAMOTIDINE 20 MG: 20 TABLET ORAL at 18:20

## 2023-02-18 RX ADMIN — ACETAMINOPHEN 650 MG: 325 TABLET, FILM COATED ORAL at 12:08

## 2023-02-18 RX ADMIN — VANCOMYCIN HYDROCHLORIDE 1000 MG: 500 INJECTION, POWDER, LYOPHILIZED, FOR SOLUTION INTRAVENOUS at 10:41

## 2023-02-18 RX ADMIN — HYDROMORPHONE HYDROCHLORIDE: 10 INJECTION, SOLUTION INTRAMUSCULAR; INTRAVENOUS; SUBCUTANEOUS at 13:40

## 2023-02-18 RX ADMIN — CHLORHEXIDINE GLUCONATE 0.12% ORAL RINSE 15 ML: 1.2 LIQUID ORAL at 16:51

## 2023-02-18 RX ADMIN — VANCOMYCIN HYDROCHLORIDE 1000 MG: 500 INJECTION, POWDER, LYOPHILIZED, FOR SOLUTION INTRAVENOUS at 18:26

## 2023-02-18 RX ADMIN — CEFEPIME 2 G: 2 INJECTION, POWDER, FOR SOLUTION INTRAVENOUS at 01:14

## 2023-02-18 RX ADMIN — CEFEPIME 2 G: 2 INJECTION, POWDER, FOR SOLUTION INTRAVENOUS at 16:51

## 2023-02-18 RX ADMIN — FAMOTIDINE 20 MG: 20 TABLET ORAL at 06:21

## 2023-02-18 RX ADMIN — ONDANSETRON 8 MG: 2 INJECTION INTRAMUSCULAR; INTRAVENOUS at 08:34

## 2023-02-18 RX ADMIN — VANCOMYCIN HYDROCHLORIDE 1000 MG: 500 INJECTION, POWDER, LYOPHILIZED, FOR SOLUTION INTRAVENOUS at 03:37

## 2023-02-18 RX ADMIN — ACETAMINOPHEN 650 MG: 325 TABLET, FILM COATED ORAL at 06:19

## 2023-02-18 RX ADMIN — CHLORHEXIDINE GLUCONATE 0.12% ORAL RINSE 15 ML: 1.2 LIQUID ORAL at 08:45

## 2023-02-18 RX ADMIN — CHLORHEXIDINE GLUCONATE 0.12% ORAL RINSE 15 ML: 1.2 LIQUID ORAL at 13:50

## 2023-02-18 RX ADMIN — ACETAMINOPHEN 650 MG: 325 TABLET, FILM COATED ORAL at 01:11

## 2023-02-18 RX ADMIN — IMATINIB MESYLATE 200 MG: 100 TABLET, FILM COATED ORAL at 06:24

## 2023-02-18 RX ADMIN — ONDANSETRON 8 MG: 2 INJECTION INTRAMUSCULAR; INTRAVENOUS at 01:11

## 2023-02-18 RX ADMIN — CEFEPIME 2 G: 2 INJECTION, POWDER, FOR SOLUTION INTRAVENOUS at 08:02

## 2023-02-18 RX ADMIN — ONDANSETRON 8 MG: 2 INJECTION INTRAMUSCULAR; INTRAVENOUS at 16:43

## 2023-02-18 ASSESSMENT — PAIN DESCRIPTION - PAIN TYPE
TYPE: SURGICAL PAIN

## 2023-02-18 NOTE — PROGRESS NOTES
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  Surgery this morning by Dr Haddad revealed a soft-tissue abscess with purulent fluid (sent for culture); exploration of the shoulder joint revealed some \"turbid\" fluid, as well.    Back from surgery and pain is increased.  PCA in place. JULY indicates that he does not require any additional pain management, for now.    Review of Systems:     Constitutional: Temp 38.1 x 1. Little appetite.  HENT: Negative for nosebleeds, congestion, rhinorrhea, sore throat, mouth sores.  Respiratory: Negative for shortness of breath or cough (still on NC oxygen at 2 LPM).   Cardiovascular: Negative for chest pain and leg swelling.    Gastrointestinal: Negative for vomiting, abdominal pain, diarrhea, constipation and blood in stool.    Skin: Negative for rash or skin infection..  Neurological: Negative for focal weakness or headaches.    Endo/Heme/Allergies: No bruising/bleeding easily.    Psychiatric/Behavioral: Exhausted. "     Medications:    Current Facility-Administered Medications:     HYDROmorphone (DILAUDID) 0.2 mg/mL in 50 mL NS (PCA), , Intravenous, Continuous, River Rodriguez M.D., New Bag at 02/17/23 1523    heparin lock flush 100 unit/mL injection 300-500 Units, 300-500 Units, Intracatheter, PRN, River Rodriguez M.D., 500 Units at 02/14/23 1648    vancomycin (VANCOCIN) 1,000 mg in  mL IVPB, 1,000 mg, Intravenous, Q8HR, River Rodriguez M.D., Stopped at 02/17/23 1135    Pharmacy Consult Request ...Pain Management Review 1 Each, 1 Each, Other, PHARMACY TO DOSE, River Rodriguez M.D.    acetaminophen (Tylenol) tablet 650 mg, 650 mg, Oral, Q6HRS, 650 mg at 02/17/23 0604 **FOLLOWED BY** [START ON 2/18/2023] acetaminophen (Tylenol) tablet 650 mg, 650 mg, Oral, Q6HRS PRN, River Rodriguez M.D.    senna-docusate (PERICOLACE or SENOKOT S) 8.6-50 MG per tablet 1 Tablet, 1 Tablet, Oral, DAILY, Alyce Duenas M.D., 1 Tablet at 02/17/23 0605    lidocaine (LIDODERM) 5 % 1 Patch, 1 Patch, Transdermal, Q24HRS PRN, Alyce Duenas M.D.    MD Alert...Vancomycin per Pharmacy, , Other, PHARMACY TO DOSE, River Rodriguez M.D.    LORazepam (ATIVAN) injection 0.5 mg, 0.5 mg, Intravenous, Q6HRS PRN, Alyce Duenas M.D.    lidocaine-prilocaine (EMLA) 2.5-2.5 % cream, , Topical, PRN, Alyce Duenas M.D.    D5 1/2 NS infusion, , Intravenous, Continuous, Alyce Duenas M.D., Last Rate: 100 mL/hr at 02/17/23 0722, Rate Verify at 02/17/23 0722    cefepime (Maxipime) 2 g in  mL IVPB, 2 g, Intravenous, Q8HRS, River Rodriguez M.D., Stopped at 02/17/23 0838    ondansetron (ZOFRAN) syringe/vial injection 8 mg, 8 mg, Intravenous, Q8HRS, Alyce Duenas M.D., 8 mg at 02/17/23 0805    famotidine (PEPCID) tablet 20 mg, 20 mg, Oral, BID, Alyce Duenas M.D., 20 mg at 02/17/23 0605    chlorhexidine (PERIDEX) 0.12 % solution 15 mL, 15 mL, Mouth/Throat, 4X/DAY, Alyce Duenas M.D., 15 mL at 02/17/23 0805    imatinib (Gleevec)  "tablet 200 mg, 200 mg, Oral, DAILY, Alyce Duenas M.D., 200 mg at 23 0605    imatinib (Gleevec) tablet 400 mg, 400 mg, Oral, DAILY, Alyce Duenas M.D., 400 mg at 23 0605    therapeutic multivitamin-minerals (THERAGRAN-M) tablet 1 Tablet, 1 Tablet, Oral, DAILY, Alyce Duenas M.D., 1 Tablet at 23 0600    vitamin D3 (cholecalciferol) tablet 1,000 Units, 1,000 Units, Oral, DAILY, Alyce Duenas M.D., 1,000 Units at 23 0605    OBJECTIVE:     Max Temp: Temp (24hrs), Av.8 °C (98.3 °F), Min:36.3 °C (97.3 °F), Max:38.1 °C (100.6 °F)      Vitals: BP (P) 118/84   Pulse (!) (P) 106   Temp (P) 36.6 °C (97.8 °F) (Temporal)   Resp (P) 15   Ht 1.651 m (5' 5\")   Wt 66 kg (145 lb 8.1 oz)   SpO2 (P) 95%   BMI 24.21 kg/m²       Intake/Output Summary (Last 24 hours) at 2023 1603  Last data filed at 2023 1248  Gross per 24 hour   Intake 1889.77 ml   Output 1425 ml   Net 464.77 ml       Labs:   Latest Reference Range & Units 23 04:15 23 04:00   WBC 4.8 - 10.8 K/uL 1.7 (LL) 2.4 (L)   RBC 4.70 - 6.10 M/uL 2.58 (L) 3.08 (L)   Hemoglobin 14.0 - 18.0 g/dL 7.3 (L) 8.8 (L)   Hematocrit 42.0 - 52.0 % 22.6 (L) 26.3 (L)   MCV 81.4 - 97.8 fL 87.6 85.4   MCH 27.0 - 33.0 pg 28.3 28.6   MCHC 33.7 - 35.3 g/dL 32.3 (L) 33.5 (L)   RDW 35.9 - 50.0 fL 51.7 (H) 51.6 (H)   Platelet Count 164 - 446 K/uL 44 (L) 41 (L)   MPV 9.0 - 12.9 fL 10.9 9.3   Neutrophils-Polys 44.00 - 72.00 % 40.20 (L) 77.20 (H)   Neutrophils (Absolute) 1.82 - 7.42 K/uL 0.73 (L) 1.85   Bands-Stabs 0.00 - 10.00 % 2.60    Lymphocytes 22.00 - 41.00 % 22.20 5.30 (L)   Lymphs (Absolute) 1.00 - 4.80 K/uL 0.38 (L) 0.13 (L)   Monocytes 0.00 - 13.40 % 32.50 (H) 13.20   Metamyelocytes % 0.80 1.70   Myelocytes % 1.70 2.60   Nucleated RBC /100 WBC 1.10 1.30     Physical Exam:    Constitutional: Eyes shut, struggling with pain but responsive   HENT: Normocephalic and atraumatic. Alopecia.  No rhinorrhea. Oropharynx is clear and moist.   Eyes: " "Conjunctivae are normal. No scleral icterus.   Neck: Supple, no adenopathy.    Cardiovascular: RRR with 2/6 pulmonic murmur.  Pulmonary/Chest: Decreased excursion (pain). Symmetric expansion.  Clear to auscultation bilaterally.  Abdomen: Soft. No distension, organomegaly or mass.     Skin: Skin is warm, dry and pink.  No rash or evidence of skin infection. Port site clean and dry, accessed.       ASSESSMENT AND PLAN:     Tomas \"JULY\" Estiven is a 22 y.o. male who presented to the [facility] for Day 50 chemotherapy with Vincristine on 2/7/2023. During that appointment he was noted to be febrile with chills and dehydrated. Hence, decision made to admit him to pediatric ott for management of febrile neutropenia and dehydration.     Counts trending up. Arm pain/swelling increased dramatically on Feb 13.  Surgery today confirms CT findings of soft tissue fluid collection (purulent) and joint effusion.     Febrile Neutropenia in an immunocompromised host: Etiology not clear: Counts trending up slowly. Afebrile.   - Previous history of gram negative septic shock  - Blood culture from port 2/7/2023: NGTD  - Repeat blood culture from port 2/9/2023: NGTD  - IV Cefepime every 8 hrs  - IV Vancomycin started on 2/10/2023, 1250 mg every 12 hrs     Left shoulder pain: Myositis/intermuscular edema/fluid  - Edema and pain are more extensive since yesterday  - X-ray L shoulder 2/8/2023: not concerning  - MRI shoulder 2/10/2023: showing diffuse myositis and some intermuscular edema/fluid  - No evidence of DVT  - This may simply reflect recovery of neutrophils BUT etiology of inflammation is unclear  - Adequate pain control with PCA Dilaudid  - Added IV Vancomycin on 2/10/2023  - Now s/p open exploration/drainage     Soft tissue abscess (+/- septic arthritis):  - Culture pending  - Drain in place  - Discussed with Dr Singh (I.D.): he suspects that this is a late sequela of " JULY's episode of Gram-negative sepsis back in December  - Culture at that time grew pansensitive E.coli and Klebsiella  - Cefepime may be sufficient but we will continue vancomycin for another 1 to 2 days, pending culture results  - Total duration of IV antibiotics will be 4 to 6 weeks    Tachycardia: orthostatic vs. cardiac issues  - Some PVCs noted on tele but hemodynamically stable  - Recently admitted with severe septic shock, hence consider cardiomyopathy  - Consulted cardiology (Dr Galvez): Holter monitor and repeat echo essentially unremarkable     Constipation secondary to opioid use:  - Start senna-docusate BID  - Encouraged ambulation as tolerated.  - BM yesterday     Retrosternal chest pain : likely from LLOYD, improved  - Continue famotidine 20 mg BID  - Continue to monitor     Hypoxemia: likely from opioids/atelectasis  - 85-86% with deep sleep  - Requiring 0.5 L oxygen at night/ even in am  - IVF at 50 ml/hr given patient on vancomycin, encourage ambulation as tolerated  - Ordered incentive spirometry  - Increased oxygen requirement today likely due to neutrophil recovery?  - If continues, will get CXR to r/o pleural effusion/edema  - Consider lasix IV x 1 (but most recent weight was DOWN from last week)     Ph+ Precursor B-Cell Acute Lymphoblastic Leukemia, in MRD Remission:  - 2-3 weeks of symptoms  - Presenting WBC > 440 k/uL, hyperleukocytosis  - Start of Hydroxyurea (1500 mg PO Q8) 2320 on 5/27/2022  - discontinued after only 55 hours  - No steroid pretreatment  - 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  - Testicular status NEGATIVE     - Flow cytometry of both 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, FISH positive for BCR-ABL1 translocation  - Enrolled on [facility] [id number]  - Initially enrolled on [facility] [id number] - but 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     - Enrolled on [facility] [id number] and began study 6/13/2022  - Started imatinib therapy 6/3/2022  -  "End of Induction IA and IB MRD negative  - Imatinib dose increased to 400 mg PO AM and 250 mg PM 9/29/2022  * Note:  All imatinib doses given inpatient rounded down to 600 mg        - Imatinib held for Septic Shock 12/27/2022-1/8/2023        - Imatinib 600 mg PO daily restarted 1/8/2023 inpatient        - Imatinib 400 mg PO QAM and 250 mg PO QHS 1/14/2023-1/17/2023        - Imatinib 600 mg PO QAM 1/17/2023 - present     [id number], AR Arm B, Delayed Intensification: COMPLETE   ** \"Interim Maintenance\" ON HOLD pending improvement (will likely be delayed by 12-14 days)  ** Continue imatinib 600 mg PO daily                     Chemotherapy-related Pancytopenia:  - Transfused pRBCs yesterday: hgb up today  - Transfuse for platelets less than 10,000/microliters or with symptoms  - CBC daily: ANC up overnight; plts stable     At risk for deep vein thrombosis due to pegaspargase:  - Platelets still low  - Will hold apixaban  - Ordered SCD, encourage ambulation as tolerated     At Risk of Opportunistic Lung Infection:  - Bactrim DS PO BID Sat and Sun for PJP prophylaxis     Central Access:   - R Port-A-Cath in place      Research Participant:           [employer] [facility] - Source Data         Diagnosis: Ph+ Precursor B-Cell Acute Lymphoblastic Leukemia     Disease Status: EOI1A MRD NEGATIVE, EOI1B RD NEGATIVE, CNS3c, testicular negative, HSV1 IgG POSITIVE, CMV IgG NEGATIVE, VARICELLA IgG POSITIVE     Active Studies: [id number], [id number]                                                                                                      Inactive Studies: [id number]                                                                                                                                                Optional Studies: None             Protocol: International Phase 3 trial in Owsley chromosome-positive acute lymphoblastic leukemia (Ph+ ALL) testing imatinib in combination with two different cytotoxic chemotherapy " backbones.      Treatment Plan: [id number](OS), AR-Arm B, Delayed Intensification, Day 60     Height: 1.650 m      Weight: 64.2kg       BSA: 1.72 m²   (Delayed Intensification Day 29 1/17/2023)                                                                                                                                           Performance Status: Karnofsky 70, ECOG  2 (1/31/2023)     Treatment Plan Medications:  (100% adherent with imatinib)     Imatinib dose adjusted for weight at DI, Day 29 to Imatinib 600 mg PO daily in AM     Evaluations / Study Labs:  (2/12/2023) : None     Therapy Given: (2/12/2023):  Imatinib 600 mg PO QAM     Toxicities:  **Grade 3 febrile neutropenia on 2/7/2023 (ANC <1000/mm3 with a single temperature of >38.3 degrees C (101 degrees F) or a sustained temperature of >=38 degrees C (100.4 degrees F) for more than one hour)  ** Grade 3 diarrhea on 2/6/2023 (Increase of >=7 stools per day  over baseline; hospitalization indicated, limiting self care ADL): Resolved 2/7/2023  ** Grade 2 diarrhea on 2/7/2023 (Increase of 4 - 6 stools per day  over baseline; limiting instrumental ADL) Resolved 2/8/2023  ** Grade 3 vomiting on 2/6/2023 and 2/7/2023 (hospitalization indicated) Resolved 2/8/2023  ** Grade 2 Myositis (Moderate pain associated with weakness; pain limiting, instrumental ADL): Present on admission on 2/7/2023  ** Grade 3 Hypoxemia (Decreased oxygen saturation at rest (e.g., pulse oximeter  <88% or PaO2 <=55 mm Hg), started 2/10/2023  ** Grade 3 soft tissue infection (invasive intervention indicated)         Disposition: Pending removal of surgical drain, rising ANC, finalization of antibiotic plan        Discussed with nursing staff, Dr. Haddad, Dr. Singh. Total time today approx 60 minutes.    ANN MARIE Rodriguez MD  Pediatric Hematology / Oncology  [facility]  Cell. [phone]  Office. [phone]

## 2023-02-18 NOTE — CARE PLAN
The patient is Watcher - Medium risk of patient condition declining or worsening    Shift Goals  Clinical Goals: pain management, vitals stable, Left shoulder/arm check  Patient Goals: rest, pain management  Family Goals: supportive care, update with POC    Progress made toward(s) clinical / shift goals:    Vitals WNL. Afebrile.  Voiding QS.   Fair appetite.    Patient is not progressing towards the following goals:  LUE swollen. +CMS. Dressing changed PRN.  Protective isolation.

## 2023-02-18 NOTE — PROGRESS NOTES
"Pt woke up and removed his Left shoulder dressing because \"the dressing was oozing with blood\". Upon assessment, there was about minimal bloody drainage on the incision. The gauze on the table (he removed the dressing before calling staff) have about minimal amount of drainage on the gauze with a few drops of blood on the sheet. +CMS. Measured Left arm. It was 33cm at the beginning of shift, now 32cm. Patient reports pain managed by PCA. Vitals checked and is WNL. MD Haddad notified.   "

## 2023-02-18 NOTE — PROGRESS NOTES
Patient is a 22 year old with leukemia undergoing chemotherapy who was admitted for neutropenic fever. He developed left shoulder pain with swelling. CT was performed which showed fluid within the shoulder joint as well as areas of pyomyositis. Patient was taken to the OR yesterday for I&D of this. He is on PCA dilaudid with good pain control. He has also been on IV cefepime and vancomycin while awaiting pending culture results. JULY states he is still having pain in his shoulder but relates this to surgery. Patient accidentally removed his drain overnight.    Preliminary cultures show left shoulder superficial with rare gram negative rods.    Vital signs stable, afebrile    Physical Exam:   Left upper extremity:  Left wrist, fingers, elbow, & forearm full ROM without tenderness to palpation  Swelling of left arm slightly improved  Left shoulder with painful ROM  Tenderness to palpation anteriorly  Sensation intact to light touch throughout arm  Skin warm to the touch     Dressing clean, dry, and intact without drainage noted    Assessment: Status post Irrigation and debridement left shoulder infection done on 2/17/2023    Plan:  Continue current antibiotic regimen pending cultures  Continue current pain regimen  May use his left shoulder, but no heavy lifting  We will follow along while he is an inpatient  He will need a wound check in 2-3 weeks after surgery if he is discharged.

## 2023-02-18 NOTE — PROGRESS NOTES
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  He inadvertently pulled out his MARITZA drain overnight. (Dr Haddad is aware.) He is using his PCA \"demand\" dose 1-2 times per hour.    Review of Systems:     Constitutional: Afebrile.  Some appetite.  HENT: Negative for nosebleeds, congestion, rhinorrhea, sore throat, mouth sores.  Eyes: Negative for visual changes.  Respiratory: Negative for shortness of breath or cough; still on NC oxygen at 2 LPM.    Gastrointestinal: Negative for nausea, vomiting, abdominal pain; BM x 1 yesterday..     Skin: Negative for rash or skin infection.  Neurological: Negative for numbness, tingling, sensory changes, weakness or headaches.    Endo/Heme/Allergies: No bruising/bleeding easily.    Psychiatric/Behavioral: \"I took it easy yesterday but today I'm going to try to get better.\"     Medications:    Current Facility-Administered Medications:     HYDROmorphone (DILAUDID) 0.2 " mg/mL in 50 mL NS (PCA), , Intravenous, Continuous, River Rodriguez M.D., PCA/PCEA Clear Pump at 02/18/23 0635    heparin lock flush 100 unit/mL injection 300-500 Units, 300-500 Units, Intracatheter, PRN, River Rodriguez M.D., 500 Units at 02/14/23 1648    vancomycin (VANCOCIN) 1,000 mg in  mL IVPB, 1,000 mg, Intravenous, Q8HR, River Rodriguez M.D., Stopped at 02/18/23 0537    Pharmacy Consult Request ...Pain Management Review 1 Each, 1 Each, Other, PHARMACY TO DOSE, River Rodriguez M.D.    acetaminophen (Tylenol) tablet 650 mg, 650 mg, Oral, Q6HRS, 650 mg at 02/18/23 0619 **FOLLOWED BY** acetaminophen (Tylenol) tablet 650 mg, 650 mg, Oral, Q6HRS PRN, River Rodriguez M.D.    senna-docusate (PERICOLACE or SENOKOT S) 8.6-50 MG per tablet 1 Tablet, 1 Tablet, Oral, DAILY, Alyce Duenas M.D., 1 Tablet at 02/17/23 0605    lidocaine (LIDODERM) 5 % 1 Patch, 1 Patch, Transdermal, Q24HRS PRN, Alyce Duenas M.D.    MD Alert...Vancomycin per Pharmacy, , Other, PHARMACY TO DOSE, River Rodriguez M.D.    LORazepam (ATIVAN) injection 0.5 mg, 0.5 mg, Intravenous, Q6HRS PRN, Alyce Duenas M.D.    lidocaine-prilocaine (EMLA) 2.5-2.5 % cream, , Topical, PRN, Alyce Duenas M.D.    D5 1/2 NS infusion, , Intravenous, Continuous, Alyce Duenas M.D., Last Rate: 100 mL/hr at 02/17/23 1712, New Bag at 02/17/23 1712    cefepime (Maxipime) 2 g in  mL IVPB, 2 g, Intravenous, Q8HRS, River Rodriguez M.D., Stopped at 02/18/23 0832    ondansetron (ZOFRAN) syringe/vial injection 8 mg, 8 mg, Intravenous, Q8HRS, Alyce Duenas M.D., 8 mg at 02/18/23 0834    famotidine (PEPCID) tablet 20 mg, 20 mg, Oral, BID, Alyce Duenas M.D., 20 mg at 02/18/23 0621    chlorhexidine (PERIDEX) 0.12 % solution 15 mL, 15 mL, Mouth/Throat, 4X/DAY, Alyce Duenas M.D., 15 mL at 02/18/23 0845    imatinib (Gleevec) tablet 200 mg, 200 mg, Oral, DAILY, Alyce Duenas M.D., 200 mg at 02/18/23 0624    imatinib (Gleevec) tablet 400  "mg, 400 mg, Oral, DAILY, Alyce Duenas M.D., 400 mg at 23 0623    therapeutic multivitamin-minerals (THERAGRAN-M) tablet 1 Tablet, 1 Tablet, Oral, DAILY, Alyce Duenas M.D., 1 Tablet at 23 0600    vitamin D3 (cholecalciferol) tablet 1,000 Units, 1,000 Units, Oral, DAILY, Alyce Duenas M.D., 1,000 Units at 23 0621    OBJECTIVE:     Max Temp: Temp (24hrs), Av.6 °C (97.9 °F), Min:36.3 °C (97.3 °F), Max:37.4 °C (99.4 °F)      Vitals: /71   Pulse 92   Temp 36.7 °C (98.1 °F) (Temporal)   Resp 20   Ht 1.651 m (5' 5\")   Wt 66 kg (145 lb 8.1 oz)   SpO2 99%   BMI 24.21 kg/m²       Intake/Output Summary (Last 24 hours) at 2023 0931  Last data filed at 2023 0400  Gross per 24 hour   Intake 2230.49 ml   Output 2050 ml   Net 180.49 ml       Labs:   Latest Reference Range & Units 23 04:00 23 03:40   WBC 4.8 - 10.8 K/uL 2.4 (L) 5.4   RBC 4.70 - 6.10 M/uL 3.08 (L) 3.01 (L)   Hemoglobin 14.0 - 18.0 g/dL 8.8 (L) 8.6 (L)   Hematocrit 42.0 - 52.0 % 26.3 (L) 26.2 (L)   MCV 81.4 - 97.8 fL 85.4 87.0   MCH 27.0 - 33.0 pg 28.6 28.6   MCHC 33.7 - 35.3 g/dL 33.5 (L) 32.8 (L)   RDW 35.9 - 50.0 fL 51.6 (H) 50.8 (H)   Platelet Count 164 - 446 K/uL 41 (L) 56 (L)   MPV 9.0 - 12.9 fL 9.3 11.1   Neutrophils-Polys 44.00 - 72.00 % 77.20 (H) 82.80 (H)   Neutrophils (Absolute) 1.82 - 7.42 K/uL 1.85 4.56   Bands-Stabs 0.00 - 10.00 %  1.70   Lymphocytes 22.00 - 41.00 % 5.30 (L) 4.30 (L)   Lymphs (Absolute) 1.00 - 4.80 K/uL 0.13 (L) 0.23 (L)   Monocytes 0.00 - 13.40 % 13.20 10.30   Metamyelocytes % 1.70 0.90   Myelocytes % 2.60    Nucleated RBC /100 WBC 1.30 0.40       Physical Exam:    Constitutional: More alert and talkative.   HENT: Normocephalic and atraumatic. Alopecia.  No rhinorrhea. Oropharynx is clear and moist.   Eyes: Conjunctivae are normal. No scleral icterus.   Neck: Supple, no adenopathy.    Cardiovascular: RRR with 2/6 pulmonic murmur.  Pulmonary/Chest: Effort normal. Symmetric expansion. " " Clear to auscultation bilaterally.  Abdomen: Soft. Bowel sounds are normal. No distension, organomegaly or mass.     Genitourinary:  Deferred  Musculoskeletal: Bulky dressing over left shoulder; left arm swelling is slightly less tense.  Skin: Skin is warm, dry and pink.  No rash or evidence of skin infection. Port site clean and dry, accessed.     ASSESSMENT AND PLAN:     Tomas \"JULY\" Estiven is a 22 y.o. male who presented to the [facility] for Day 50 chemotherapy with Vincristine on 2/7/2023. During that appointment he was noted to be febrile with chills and dehydrated. Hence, decision made to admit him to pediatric ott for management of febrile neutropenia and dehydration.     Counts trending up. Arm pain/swelling increased dramatically on Feb 13.  Surgery today confirms CT findings of soft tissue fluid collection (purulent) and joint effusion.     Febrile Neutropenia in an immunocompromised host: Etiology not clear: Counts trending up slowly. Afebrile.   - Previous history of gram negative septic shock  - Blood culture from port 2/7/2023: NGTD  - Repeat blood culture from port 2/9/2023: NGTD  - IV Cefepime every 8 hrs  - IV Vancomycin started on 2/10/2023, 1250 mg every 12 hrs     Left shoulder pain: Abscess / joint effusion  - Edema and pain are marginally improved since yesterday  - X-ray L shoulder 2/8/2023: not concerning  - MRI shoulder 2/10/2023: showing diffuse myositis and some intermuscular edema/fluid  - No evidence of DVT  - Now s/p open exploration/drainage  - Adequate pain control with PCA Dilaudid  - Added IV Vancomycin on 2/10/2023  - Culture pending  - Drain out  - Discussed yesterday with Dr Singh (I.D.): he suspects that this is a late sequela of JULY's episode of Gram-negative sepsis back in December  - Culture at that time grew pansensitive E.coli and Klebsiella  - Cefepime may be sufficient but we will continue vancomycin for " another 1 to 2 days, pending culture results  - Total duration of IV antibiotics will be 4 to 6 weeks     Tachycardia: orthostatic vs. cardiac issues  - Some PVCs noted on tele but hemodynamically stable  - Recently admitted with severe septic shock, hence consider cardiomyopathy  - Consulted cardiology (Dr Galvez): Holter monitor and repeat echo essentially unremarkable     Constipation secondary to opioid use:  - Start senna-docusate BID  - Encouraged ambulation as tolerated.  - BM yesterday     Retrosternal chest pain : likely from LLOYD, improved  - Continue famotidine 20 mg BID  - Continue to monitor     Hypoxemia: likely from opioids/atelectasis  - Requiring 2 LPM oxygen  - IVF at 50 ml/hr given patient on vancomycin, encourage ambulation as tolerated  - Ordered incentive spirometry       Ph+ Precursor B-Cell Acute Lymphoblastic Leukemia, in MRD Remission:  - 2-3 weeks of symptoms  - Presenting WBC > 440 k/uL, hyperleukocytosis  - Start of Hydroxyurea (1500 mg PO Q8) 2320 on 5/27/2022  - discontinued after only 55 hours  - No steroid pretreatment  - 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  - Testicular status NEGATIVE     - Flow cytometry of both 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, FISH positive for BCR-ABL1 translocation  - Enrolled on [facility] [id number]  - Initially enrolled on [facility] [id number] - but 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     - Enrolled on [facility] [id number] and began study 6/13/2022  - Started imatinib therapy 6/3/2022  - End of Induction IA and IB MRD negative  - Imatinib dose increased to 400 mg PO AM and 250 mg PM 9/29/2022  * Note:  All imatinib doses given inpatient rounded down to 600 mg        - Imatinib held for Septic Shock 12/27/2022-1/8/2023        - Imatinib 600 mg PO daily restarted 1/8/2023 inpatient        - Imatinib 400 mg PO QAM and 250 mg PO QHS 1/14/2023-1/17/2023        - Imatinib 600 mg PO QAM 1/17/2023 - present     [place] [id number],  "AR Arm B, Delayed Intensification: COMPLETE   ** \"Interim Maintenance\" ON HOLD pending improvement (will likely be delayed by 12-14 days)  ** Continue imatinib 600 mg PO daily                     Chemotherapy-related Pancytopenia:  - Transfused pRBCs yesterday: hgb up today  - Transfuse for platelets less than 10,000/microliters or with symptoms  - CBC daily: ANC up nicely overnight; plts increasing     At risk for deep vein thrombosis due to pegaspargase:  - Platelets still low  - Will hold apixaban  - Ordered SCD, encourage ambulation as tolerated     At Risk of Opportunistic Lung Infection:  - Bactrim DS PO BID Sat and Sun for PJP prophylaxis     Central Access:   - R Port-A-Cath in place      Research Participant:           [facility] [facility] - Source Data         Diagnosis: Ph+ Precursor B-Cell Acute Lymphoblastic Leukemia     Disease Status: EOI1A MRD NEGATIVE, EOI1B RD NEGATIVE, CNS3c, testicular negative, HSV1 IgG POSITIVE, CMV IgG NEGATIVE, VARICELLA IgG POSITIVE     Active Studies: [id number], [id number]                                                                                                      Inactive Studies: [id number]                                                                                                                                                Optional Studies: None             Protocol: International Phase 3 trial in [place] 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      Treatment Plan: [id number]), AR-Arm B, Delayed Intensification, Day 61     Height: 1.650 m      Weight: 64.2kg       BSA: 1.72 m²   (Delayed Intensification Day 29 1/17/2023)                                                                                                                                           Performance Status: Karnofsky 70, ECOG  2 (1/31/2023)     Treatment Plan Medications:  (100% " adherent with imatinib)     Imatinib dose adjusted for weight at DI, Day 29 to Imatinib 600 mg PO daily in AM     Evaluations / Study Labs:  (2/12/2023) : None     Therapy Given: (2/12/2023):  Imatinib 600 mg PO QAM     Toxicities:  **Grade 3 febrile neutropenia on 2/7/2023 (ANC <1000/mm3 with a single temperature of >38.3 degrees C (101 degrees F) or a sustained temperature of >=38 degrees C (100.4 degrees F) for more than one hour)  ** Grade 3 diarrhea on 2/6/2023 (Increase of >=7 stools per day  over baseline; hospitalization indicated, limiting self care ADL): Resolved 2/7/2023  ** Grade 2 diarrhea on 2/7/2023 (Increase of 4 - 6 stools per day  over baseline; limiting instrumental ADL) Resolved 2/8/2023  ** Grade 3 vomiting on 2/6/2023 and 2/7/2023 (hospitalization indicated) Resolved 2/8/2023  ** Grade 2 Myositis (Moderate pain associated with weakness; pain limiting, instrumental ADL): Present on admission on 2/7/2023  ** Grade 3 Hypoxemia (Decreased oxygen saturation at rest (e.g., pulse oximeter  <88% or PaO2 <=55 mm Hg), started 2/10/2023  ** Grade 3 soft tissue infection (invasive intervention indicated)         Disposition: Pending wean from IV pain meds, rising ANC, finalization of antibiotic plan     Discussed with nursing staff, Dr. Haddad.  Total time today approx 30 minutes.    ANN MARIE Rodriguez MD  Pediatric Hematology / Oncology  [facility]  Cell. [phone]  Office. [phone]

## 2023-02-19 LAB
ANISOCYTOSIS BLD QL SMEAR: ABNORMAL
BASOPHILS # BLD AUTO: 0 % (ref 0–1.8)
BASOPHILS # BLD: 0 K/UL (ref 0–0.12)
EOSINOPHIL # BLD AUTO: 0 K/UL (ref 0–0.51)
EOSINOPHIL NFR BLD: 0 % (ref 0–6.9)
ERYTHROCYTE [DISTWIDTH] IN BLOOD BY AUTOMATED COUNT: 52.5 FL (ref 35.9–50)
HCT VFR BLD AUTO: 23.4 % (ref 42–52)
HGB BLD-MCNC: 7.6 G/DL (ref 14–18)
HYPOCHROMIA BLD QL SMEAR: ABNORMAL
LYMPHOCYTES # BLD AUTO: 0.12 K/UL (ref 1–4.8)
LYMPHOCYTES NFR BLD: 2.6 % (ref 22–41)
MANUAL DIFF BLD: NORMAL
MCH RBC QN AUTO: 28.6 PG (ref 27–33)
MCHC RBC AUTO-ENTMCNC: 32.5 G/DL (ref 33.7–35.3)
MCV RBC AUTO: 88 FL (ref 81.4–97.8)
METAMYELOCYTES NFR BLD MANUAL: 0.9 %
MONOCYTES # BLD AUTO: 0.32 K/UL (ref 0–0.85)
MONOCYTES NFR BLD AUTO: 6.9 % (ref 0–13.4)
MORPHOLOGY BLD-IMP: NORMAL
MYELOCYTES NFR BLD MANUAL: 0.8 %
NEUTROPHILS # BLD AUTO: 4.17 K/UL (ref 1.82–7.42)
NEUTROPHILS NFR BLD: 88.8 % (ref 44–72)
NRBC # BLD AUTO: 0.04 K/UL
NRBC BLD-RTO: 0.9 /100 WBC
OVALOCYTES BLD QL SMEAR: NORMAL
PLATELET # BLD AUTO: 62 K/UL (ref 164–446)
PLATELET BLD QL SMEAR: NORMAL
PMV BLD AUTO: 10 FL (ref 9–12.9)
POIKILOCYTOSIS BLD QL SMEAR: NORMAL
POLYCHROMASIA BLD QL SMEAR: NORMAL
RBC # BLD AUTO: 2.66 M/UL (ref 4.7–6.1)
RBC BLD AUTO: PRESENT
WBC # BLD AUTO: 4.7 K/UL (ref 4.8–10.8)

## 2023-02-19 PROCEDURE — 770003 HCHG ROOM/CARE - PEDIATRIC PRIVATE*

## 2023-02-19 PROCEDURE — 700102 HCHG RX REV CODE 250 W/ 637 OVERRIDE(OP): Performed by: PEDIATRICS

## 2023-02-19 PROCEDURE — A9270 NON-COVERED ITEM OR SERVICE: HCPCS | Performed by: PEDIATRICS

## 2023-02-19 PROCEDURE — 700105 HCHG RX REV CODE 258: Performed by: PEDIATRICS

## 2023-02-19 PROCEDURE — 99024 POSTOP FOLLOW-UP VISIT: CPT | Performed by: PHYSICIAN ASSISTANT

## 2023-02-19 PROCEDURE — 99232 SBSQ HOSP IP/OBS MODERATE 35: CPT | Performed by: PEDIATRICS

## 2023-02-19 PROCEDURE — 85025 COMPLETE CBC W/AUTO DIFF WBC: CPT

## 2023-02-19 PROCEDURE — 700111 HCHG RX REV CODE 636 W/ 250 OVERRIDE (IP): Performed by: PEDIATRICS

## 2023-02-19 PROCEDURE — 700101 HCHG RX REV CODE 250: Performed by: PEDIATRICS

## 2023-02-19 PROCEDURE — 82784 ASSAY IGA/IGD/IGG/IGM EACH: CPT

## 2023-02-19 PROCEDURE — 85007 BL SMEAR W/DIFF WBC COUNT: CPT

## 2023-02-19 RX ORDER — METRONIDAZOLE 500 MG/100ML
500 INJECTION, SOLUTION INTRAVENOUS EVERY 8 HOURS
Status: DISCONTINUED | OUTPATIENT
Start: 2023-02-19 | End: 2023-02-28

## 2023-02-19 RX ORDER — SULFAMETHOXAZOLE AND TRIMETHOPRIM 800; 160 MG/1; MG/1
1 TABLET ORAL EVERY 12 HOURS
Status: COMPLETED | OUTPATIENT
Start: 2023-02-19 | End: 2023-02-21

## 2023-02-19 RX ADMIN — HYDROMORPHONE HYDROCHLORIDE: 10 INJECTION, SOLUTION INTRAMUSCULAR; INTRAVENOUS; SUBCUTANEOUS at 01:09

## 2023-02-19 RX ADMIN — CHLORHEXIDINE GLUCONATE 0.12% ORAL RINSE 15 ML: 1.2 LIQUID ORAL at 20:37

## 2023-02-19 RX ADMIN — VANCOMYCIN HYDROCHLORIDE 1000 MG: 500 INJECTION, POWDER, LYOPHILIZED, FOR SOLUTION INTRAVENOUS at 18:37

## 2023-02-19 RX ADMIN — HYDROMORPHONE HYDROCHLORIDE: 10 INJECTION, SOLUTION INTRAMUSCULAR; INTRAVENOUS; SUBCUTANEOUS at 12:54

## 2023-02-19 RX ADMIN — ACETAMINOPHEN 650 MG: 325 TABLET, FILM COATED ORAL at 04:59

## 2023-02-19 RX ADMIN — FAMOTIDINE 20 MG: 20 TABLET ORAL at 06:05

## 2023-02-19 RX ADMIN — ONDANSETRON 8 MG: 2 INJECTION INTRAMUSCULAR; INTRAVENOUS at 01:27

## 2023-02-19 RX ADMIN — SULFAMETHOXAZOLE AND TRIMETHOPRIM 1 TABLET: 800; 160 TABLET ORAL at 20:36

## 2023-02-19 RX ADMIN — CEFEPIME 2 G: 2 INJECTION, POWDER, FOR SOLUTION INTRAVENOUS at 17:07

## 2023-02-19 RX ADMIN — IMATINIB MESYLATE 200 MG: 100 TABLET, FILM COATED ORAL at 06:05

## 2023-02-19 RX ADMIN — Medication 1000 UNITS: at 06:05

## 2023-02-19 RX ADMIN — IMATINIB MESYLATE 400 MG: 400 TABLET, FILM COATED ORAL at 06:05

## 2023-02-19 RX ADMIN — VANCOMYCIN HYDROCHLORIDE 1000 MG: 500 INJECTION, POWDER, LYOPHILIZED, FOR SOLUTION INTRAVENOUS at 10:48

## 2023-02-19 RX ADMIN — ACETAMINOPHEN 650 MG: 325 TABLET, FILM COATED ORAL at 17:42

## 2023-02-19 RX ADMIN — CHLORHEXIDINE GLUCONATE 0.12% ORAL RINSE 15 ML: 1.2 LIQUID ORAL at 08:39

## 2023-02-19 RX ADMIN — CEFEPIME 2 G: 2 INJECTION, POWDER, FOR SOLUTION INTRAVENOUS at 08:43

## 2023-02-19 RX ADMIN — CHLORHEXIDINE GLUCONATE 0.12% ORAL RINSE 15 ML: 1.2 LIQUID ORAL at 16:22

## 2023-02-19 RX ADMIN — METRONIDAZOLE 500 MG: 5 INJECTION, SOLUTION INTRAVENOUS at 22:23

## 2023-02-19 RX ADMIN — ONDANSETRON 8 MG: 2 INJECTION INTRAMUSCULAR; INTRAVENOUS at 16:22

## 2023-02-19 RX ADMIN — FAMOTIDINE 20 MG: 20 TABLET ORAL at 18:37

## 2023-02-19 RX ADMIN — CHLORHEXIDINE GLUCONATE 0.12% ORAL RINSE 15 ML: 1.2 LIQUID ORAL at 12:56

## 2023-02-19 RX ADMIN — APIXABAN 2.5 MG: 2.5 TABLET, FILM COATED ORAL at 20:37

## 2023-02-19 RX ADMIN — HYDROMORPHONE HYDROCHLORIDE: 10 INJECTION, SOLUTION INTRAMUSCULAR; INTRAVENOUS; SUBCUTANEOUS at 17:32

## 2023-02-19 RX ADMIN — ONDANSETRON 8 MG: 2 INJECTION INTRAMUSCULAR; INTRAVENOUS at 08:39

## 2023-02-19 RX ADMIN — CEFEPIME 2 G: 2 INJECTION, POWDER, FOR SOLUTION INTRAVENOUS at 01:09

## 2023-02-19 RX ADMIN — HYDROMORPHONE HYDROCHLORIDE: 10 INJECTION, SOLUTION INTRAMUSCULAR; INTRAVENOUS; SUBCUTANEOUS at 09:33

## 2023-02-19 RX ADMIN — VANCOMYCIN HYDROCHLORIDE 1000 MG: 500 INJECTION, POWDER, LYOPHILIZED, FOR SOLUTION INTRAVENOUS at 02:21

## 2023-02-19 ASSESSMENT — PAIN DESCRIPTION - PAIN TYPE
TYPE: ACUTE PAIN
TYPE: SURGICAL PAIN
TYPE: ACUTE PAIN
TYPE: SURGICAL PAIN
TYPE: SURGICAL PAIN

## 2023-02-19 NOTE — PROGRESS NOTES
Assumed care of pt at 2140. Received report from MONTSERRAT Sharma. Pt is resting comfortably in bed, no apparent signs of distress. No further needs at this time.

## 2023-02-19 NOTE — PROGRESS NOTES
Patient is a 22 year old with leukemia undergoing chemotherapy who was admitted for neutropenic fever. He developed left shoulder pain with swelling. CT was performed which showed fluid within the shoulder joint as well as areas of pyomyositis. Patient was taken to the OR on 2/17/2023 for I&D of this. He is day #2 post op. He is on PCA dilaudid now with good pain control as his dilaudid dose was increased. He has also been on IV cefepime and vancomycin while awaiting pending culture results. JULY states he is still having pain and swelling which makes it hard for him to move his shoulder.      Preliminary cultures show no growth in 48 hours.     Vital signs stable, afebrile     Physical Exam:   Left upper extremity:  Left wrist, fingers, elbow, & forearm full ROM without tenderness to palpation  Swelling of left arm from shoulder to hand unchanged  Left shoulder with painful ROM  Tenderness to palpation anteriorly  Sensation intact to light touch throughout arm  Skin warm to the touch      Dressing clean, dry, and intact without drainage noted     Assessment: Status post Irrigation and debridement left shoulder infection done on 2/17/2023     Plan:  Continue current antibiotic regimen pending cultures  Continue current pain regimen  May use his left shoulder, but no heavy lifting  Recommend elevating arm if possible to help with swelling  We will follow along while he is an inpatient  He will need a wound check in 2-3 weeks after surgery if he is discharged.

## 2023-02-19 NOTE — PROGRESS NOTES
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    Review of Systems:     Constitutional: Afebrile. Fair appetite.  HENT: Negative for nosebleeds, congestion, rhinorrhea, sore throat, mouth sores.  Respiratory: Negative for shortness of breath or cough.   Gastrointestinal: Negative for nausea, vomiting, abdominal pain, diarrhea, constipation.    Skin: Negative for rash or skin infection..  Neurological: Negative for numbness, tingling, sensory changes, weakness or headaches.    Endo/Heme/Allergies: No bruising/bleeding easily.    Psychiatric/Behavioral: Brighter affect.     Medications:    Current Facility-Administered Medications:     HYDROmorphone (DILAUDID) 0.2 mg/mL in 50 mL NS (PCA), , Intravenous, Continuous, River Rodriguez M.D., New Bag at 23 0922    heparin lock flush 100 unit/mL injection 300-500 Units, 300-500 Units, Intracatheter, PRN, River Rodriguez M.D., 500 Units at 23 1591     vancomycin (VANCOCIN) 1,000 mg in  mL IVPB, 1,000 mg, Intravenous, Q8HR, River Rodriguez M.D., Last Rate: 125 mL/hr at 23 1048, 1,000 mg at 23 1048    Pharmacy Consult Request ...Pain Management Review 1 Each, 1 Each, Other, PHARMACY TO DOSE, River Rodriguez M.D.    [] acetaminophen (Tylenol) tablet 650 mg, 650 mg, Oral, Q6HRS, 650 mg at 23 1208 **FOLLOWED BY** acetaminophen (Tylenol) tablet 650 mg, 650 mg, Oral, Q6HRS PRN, River Rodriguez M.D., 650 mg at 23 0459    senna-docusate (PERICOLACE or SENOKOT S) 8.6-50 MG per tablet 1 Tablet, 1 Tablet, Oral, DAILY, Alyce Duenas M.D., 1 Tablet at 23 0605    lidocaine (LIDODERM) 5 % 1 Patch, 1 Patch, Transdermal, Q24HRS PRN, Alyce Duenas M.D.    MD Alert...Vancomycin per Pharmacy, , Other, PHARMACY TO DOSE, River Rodriguez M.D.    LORazepam (ATIVAN) injection 0.5 mg, 0.5 mg, Intravenous, Q6HRS PRN, Alyce Duenas M.D.    lidocaine-prilocaine (EMLA) 2.5-2.5 % cream, , Topical, PRN, Alyce Duenas M.D.    D5 1/2 NS infusion, , Intravenous, Continuous, Alyce Duenas M.D., Last Rate: 30 mL/hr at 23 0715, Rate Verify at 23 0715    cefepime (Maxipime) 2 g in  mL IVPB, 2 g, Intravenous, Q8HRS, River Rodriguez M.D., Stopped at 23 0913    ondansetron (ZOFRAN) syringe/vial injection 8 mg, 8 mg, Intravenous, Q8HRS, Alyce Duenas M.D., 8 mg at 23 0839    famotidine (PEPCID) tablet 20 mg, 20 mg, Oral, BID, Alyce Duenas M.D., 20 mg at 23 0605    chlorhexidine (PERIDEX) 0.12 % solution 15 mL, 15 mL, Mouth/Throat, 4X/DAY, Alyce Duenas M.D., 15 mL at 23 0839    imatinib (Gleevec) tablet 200 mg, 200 mg, Oral, DAILY, Alyce Duenas M.D., 200 mg at 23 06    imatinib (Gleevec) tablet 400 mg, 400 mg, Oral, DAILY, Alyce Duenas M.D., 400 mg at 23 0605    therapeutic multivitamin-minerals (THERAGRAN-M) tablet 1 Tablet, 1 Tablet, Oral, DAILY, Alyce Duenas M.D., 1 Tablet at  "23 0600    vitamin D3 (cholecalciferol) tablet 1,000 Units, 1,000 Units, Oral, DAILY, Alyce Duenas M.D., 1,000 Units at 23 0605    OBJECTIVE:     Max Temp: Temp (24hrs), Av.1 °C (98.8 °F), Min:36.8 °C (98.3 °F), Max:37.8 °C (100 °F)      Vitals: BP (P) 120/72   Pulse (!) (P) 101   Temp (P) 37 °C (98.6 °F) (Temporal)   Resp (P) 18   Ht 1.651 m (5' 5\")   Wt 66 kg (145 lb 8.1 oz)   SpO2 (P) 93%   BMI 24.21 kg/m²       Intake/Output Summary (Last 24 hours) at 2023 1056  Last data filed at 2023 0400  Gross per 24 hour   Intake 317.67 ml   Output 2250 ml   Net -1932.33 ml       Labs:   Latest Reference Range & Units 23 04:00 23 03:40 23 05:58   WBC 4.8 - 10.8 K/uL 2.4 (L) 5.4 4.7 (L)   RBC 4.70 - 6.10 M/uL 3.08 (L) 3.01 (L) 2.66 (L)   Hemoglobin 14.0 - 18.0 g/dL 8.8 (L) 8.6 (L) 7.6 (L)   Hematocrit 42.0 - 52.0 % 26.3 (L) 26.2 (L) 23.4 (L)   Platelet Count 164 - 446 K/uL 41 (L) 56 (L) 62 (L)   MPV 9.0 - 12.9 fL 9.3 11.1 10.0   Neutrophils-Polys 44.00 - 72.00 % 77.20 (H) 82.80 (H) 88.80 (H)   Neutrophils (Absolute) 1.82 - 7.42 K/uL 1.85 4.56 4.17   Bands-Stabs 0.00 - 10.00 %  1.70    Lymphocytes 22.00 - 41.00 % 5.30 (L) 4.30 (L) 2.60 (L)   Lymphs (Absolute) 1.00 - 4.80 K/uL 0.13 (L) 0.23 (L) 0.12 (L)   Monocytes 0.00 - 13.40 % 13.20 10.30 6.90   Metamyelocytes % 1.70 0.90 0.90   Myelocytes % 2.60  0.80   Nucleated RBC /100 WBC 1.30 0.40 0.90      Latest Reference Range & Units 23 03:40   Sodium 135 - 145 mmol/L 137   Potassium 3.6 - 5.5 mmol/L 3.7   Chloride 96 - 112 mmol/L 105   Co2 20 - 33 mmol/L 24   Anion Gap 7.0 - 16.0  8.0   Glucose 65 - 99 mg/dL 110 (H)   Bun 8 - 22 mg/dL 6 (L)   Creatinine 0.50 - 1.40 mg/dL 0.26 (L)   GFR (CKD-EPI) >60 mL/min/1.73 m 2 181   Calcium 8.5 - 10.5 mg/dL 7.1 (L)   Correct Calcium 8.5 - 10.5 mg/dL 8.5   AST(SGOT) 12 - 45 U/L 19   ALT(SGPT) 2 - 50 U/L 16   Alkaline Phosphatase 30 - 99 U/L 129 (H)   Total Bilirubin 0.1 - 1.5 mg/dL 0.4 " "  Albumin 3.2 - 4.9 g/dL 2.3 (L)   Total Protein 6.0 - 8.2 g/dL 4.6 (L)   Globulin 1.9 - 3.5 g/dL 2.3   A-G Ratio g/dL 1.0       Physical Exam:    Constitutional: Pleasant, comfortable-appearing. Somewhat pale.  HENT: Normocephalic and atraumatic. Alopecia.  No rhinorrhea. Oropharynx is clear and moist.   Eyes: Conjunctivae are normal. No scleral icterus.   Neck: Supple, no adenopathy.    Cardiovascular: RRR with 2/6 pulmonic murmur.  Pulmonary/Chest: Effort normal. Symmetric expansion.  Clear to auscultation bilaterally.  Abdomen: Soft. Bowel sounds are normal. No distension, organomegaly or mass.     Musculoskeletal: Left shoulder/arm swelling unchanged.   Skin: No rash or evidence of skin infection. Port site clean and dry, accessed.       ASSESSMENT AND PLAN:     Tomas \"DIOMEDES Jean-Baptiste is a 22 y.o. male who presented to the [facility] for Day 50 chemotherapy with Vincristine on 2/7/2023. During that appointment he was noted to be febrile with chills and dehydrated. Hence, decision made to admit him to pediatric ott for management of febrile neutropenia and dehydration.     Counts trending up. Arm pain/swelling increased dramatically on Feb 13.  Surgery on Feb 17 confirmed CT findings of soft tissue fluid collection (purulent) and joint effusion.     Febrile Neutropenia in an immunocompromised host: Etiology not clear: Counts trending up slowly. Afebrile.   - Previous history of gram negative septic shock  - Blood culture from port 2/7/2023: NGTD  - Repeat blood culture from port 2/9/2023: NGTD  - IV Cefepime every 8 hrs  - IV Vancomycin started on 2/10/2023, 1250 mg every 12 hrs     Left shoulder pain: Abscess / joint effusion  - Edema and pain are marginally improved since yesterday  - X-ray L shoulder 2/8/2023: not concerning  - MRI shoulder 2/10/2023: showing diffuse myositis and some intermuscular edema/fluid  - No evidence of DVT  - Now s/p open " exploration/drainage  - Marginal pain control with PCA Dilaudid:  Increase basal rate to 0.5 mg/hr  Increase demand dose to 0.4 mg  - Added IV Vancomycin on 2/10/2023  - Culture NG at 24 hours  - Drain out  - Discussed yesterday with Dr Singh (I.D.): he suspects that this is a late sequela of JULY's episode of Gram-negative sepsis back in December  - Culture at that time grew pansensitive E.coli and Klebsiella  - Cefepime may be sufficient but we will continue vancomycin for another 1 to 2 days, pending culture results  - Total duration of IV antibiotics will be 4 to 6 weeks  - Decreased protein/albumin noted today; check total IgG, consider IVIg supplementation     Tachycardia: orthostatic vs. cardiac issues  - Some PVCs noted on tele but hemodynamically stable  - Recently admitted with severe septic shock, hence consider cardiomyopathy  - Consulted cardiology (Dr Galvez): Holter monitor and repeat echo essentially unremarkable     Constipation secondary to opioid use:  - Start senna-docusate BID  - Encouraged ambulation as tolerated.  - BM yesterday     Retrosternal chest pain : likely from LLOYD, improved  - Continue famotidine 20 mg BID  - Continue to monitor     Hypoxemia: likely from opioids/atelectasis  - Weaned to 1 LPM oxygen  - IVF at 50 ml/hr given patient on vancomycin, encourage ambulation as tolerated  - Ordered incentive spirometry        Ph+ Precursor B-Cell Acute Lymphoblastic Leukemia, in MRD Remission:  - 2-3 weeks of symptoms  - Presenting WBC > 440 k/uL, hyperleukocytosis  - Start of Hydroxyurea (1500 mg PO Q8) 2320 on 5/27/2022  - discontinued after only 55 hours  - No steroid pretreatment  - 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  - Testicular status NEGATIVE     - Flow cytometry of both 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, FISH positive for BCR-ABL1 translocation  - Enrolled on [facility] [id number]  - Initially enrolled on [facility] [id number] - but  "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     - Enrolled on [facility] [id number] and began study 6/13/2022  - Started imatinib therapy 6/3/2022  - End of Induction IA and IB MRD negative  - Imatinib dose increased to 400 mg PO AM and 250 mg PM 9/29/2022  * Note:  All imatinib doses given inpatient rounded down to 600 mg        - Imatinib held for Septic Shock 12/27/2022-1/8/2023        - Imatinib 600 mg PO daily restarted 1/8/2023 inpatient        - Imatinib 400 mg PO QAM and 250 mg PO QHS 1/14/2023-1/17/2023        - Imatinib 600 mg PO QAM 1/17/2023 - present     [id number], AR Arm B, Delayed Intensification: COMPLETE   ** \"Interim Maintenance\" ON HOLD pending improvement (will likely be delayed by 12-14 days)  ** Continue imatinib 600 mg PO daily                     Chemotherapy-related Pancytopenia:  - Transfused pRBCs on Feb 16: hgb trending down  - Transfuse for platelets less than 10,000/microliters or with symptoms  - CBC daily: ANC, plts increasing  - Consider pRBCs tomorrow if hgb drops further     At risk for deep vein thrombosis due to pegaspargase:  - Platelets >50k  - Will re-start apixaban  - Ordered SCD, encourage ambulation as tolerated     At Risk of Opportunistic Lung Infection:  - Bactrim DS PO BID Sat and Sun for PJP prophylaxis     Central Access:   - R Port-A-Cath in place      Research Participant:           [facility] [facility] - Source Data         Diagnosis: Ph+ Precursor B-Cell Acute Lymphoblastic Leukemia     Disease Status: EOI1A MRD NEGATIVE, EOI1B RD NEGATIVE, CNS3c, testicular negative, HSV1 IgG POSITIVE, CMV IgG NEGATIVE, VARICELLA IgG POSITIVE     Active Studies: [id number], [id number]                                                                                                      Inactive Studies: [id number]                                                                                                                                                Optional Studies: None             Protocol: " International Phase 3 trial in [place] 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      Treatment Plan: [id number](OS), AR-Arm B, Delayed Intensification, Day 62     Height: 1.650 m      Weight: 64.2kg       BSA: 1.72 m²   (Delayed Intensification Day 29 1/17/2023)                                                                                                                                           Performance Status: Karnofsky 70, ECOG  2 (1/31/2023)     Treatment Plan Medications:  (100% adherent with imatinib)     Imatinib dose adjusted for weight at DI, Day 29 to Imatinib 600 mg PO daily in AM     Evaluations / Study Labs:  (2/12/2023) : None     Therapy Given: (2/12/2023):  Imatinib 600 mg PO QAM     Toxicities:  **Grade 3 febrile neutropenia on 2/7/2023 (ANC <1000/mm3 with a single temperature of >38.3 degrees C (101 degrees F) or a sustained temperature of >=38 degrees C (100.4 degrees F) for more than one hour)  ** Grade 3 diarrhea on 2/6/2023 (Increase of >=7 stools per day  over baseline; hospitalization indicated, limiting self care ADL): Resolved 2/7/2023  ** Grade 2 diarrhea on 2/7/2023 (Increase of 4 - 6 stools per day  over baseline; limiting instrumental ADL) Resolved 2/8/2023  ** Grade 3 vomiting on 2/6/2023 and 2/7/2023 (hospitalization indicated) Resolved 2/8/2023  ** Grade 2 Myositis (Moderate pain associated with weakness; pain limiting, instrumental ADL): Present on admission on 2/7/2023  ** Grade 3 Hypoxemia (Decreased oxygen saturation at rest (e.g., pulse oximeter  <88% or PaO2 <=55 mm Hg), started 2/10/2023  ** Grade 3 soft tissue infection (invasive intervention indicated)         Disposition: Pending wean from IV pain meds, rising ANC, finalization of antibiotic plan      Discussed with nursing staff.  Total time today approx 40 minutes.    ANN MARIE Rodriguez MD  Pediatric Hematology  / Oncology  [facility]  Cell. [phone]  Office. [phone]

## 2023-02-19 NOTE — CARE PLAN
The patient is Watcher - Medium risk of patient condition declining or worsening    Shift Goals  Clinical Goals: (P) pain control, stable vital signs  Patient Goals: (P) comfort  Family Goals: supportive care, update with POC    Progress made toward(s) clinical / shift goals:  Patient has has stable vital signs. MD increased dilaudid PCA setting to help with pain control.      Problem: Knowledge Deficit - Standard  Goal: Patient and family/care givers will demonstrate understanding of plan of care, disease process/condition, diagnostic tests and medications  Outcome: Progressing  Patient updated on plan of care and verbalized understanding.     Problem: Bowel Elimination  Goal: Establish and maintain regular bowel function  Outcome: Progressing  Patient has had 2 bowel movements today.    Pt demonstrates ability to turn self in bed without assistance of staff. Patient understands importance in prevention of skin breakdown, ulcers, and potential infection. Hourly rounding in effect. RN skin check complete.   Devices in place include: Chest port, pulse ox.  Skin assessed under devices: Yes.  Confirmed HAPI identified on the following date: NA   Location of HAPI: NA.  Wound Care RN following: No.  The following interventions are in place: Pulse ox sites rotated as needed. Skin assessments ever 4 hours.            Silver Nitrate Text: The wound bed was treated with silver nitrate after the biopsy was performed. Was A Bandage Applied: Yes Bill [id number] For Specimen Handling/Conveyance To Laboratory?: no Biopsy Method: Dermablade Hemostasis: Drysol Dressing: bandage Curettage Text: The wound bed was treated with curettage after the biopsy was performed. Billing Type: Third-Party Bill Lab: [id number] Detail Level: Detailed Wound Care: No ointment Notification Instructions: Patient will be notified of biopsy results. However, patient instructed to call the office if not contacted within 2 weeks. Electrodesiccation Text: The wound bed was treated with electrodesiccation after the biopsy was performed. Cryotherapy Text: The wound bed was treated with cryotherapy after the biopsy was performed. Post-Care Instructions: I reviewed with the patient in detail post-care instructions. Patient is to keep the biopsy site dry overnight, and then apply bacitracin twice daily until healed. Patient may apply hydrogen peroxide soaks to remove any crusting. Size Of Lesion In Cm: 0 Anesthesia Type: 1% lidocaine with 1:100,000 epinephrine and a 1:10 solution of 8.4% sodium bicarbonate Type Of Destruction Used: Curettage Consent: Written consent was obtained and risks were reviewed including but not limited to scarring, infection, bleeding, scabbing, incomplete removal, nerve damage and allergy to anesthesia. Electrodesiccation And Curettage Text: The wound bed was treated with electrodesiccation and curettage after the biopsy was performed. Anesthesia Volume In Cc (Will Not Render If 0): 1.5 Depth Of Biopsy: dermis Biopsy Type: H and E Path Notes (To The Dermatopathologist): Cc: aurora conti

## 2023-02-20 PROBLEM — T45.1X5A CHEMOTHERAPY INDUCED NAUSEA AND VOMITING: Status: RESOLVED | Noted: 2023-02-07 | Resolved: 2023-02-20

## 2023-02-20 PROBLEM — D69.6 THROMBOCYTOPENIA (HCC): Status: RESOLVED | Noted: 2022-06-13 | Resolved: 2023-02-20

## 2023-02-20 PROBLEM — R50.81 FEBRILE NEUTROPENIA (HCC): Status: RESOLVED | Noted: 2023-02-07 | Resolved: 2023-02-20

## 2023-02-20 PROBLEM — D70.9 FEBRILE NEUTROPENIA (HCC): Status: RESOLVED | Noted: 2023-02-07 | Resolved: 2023-02-20

## 2023-02-20 PROBLEM — R11.2 CHEMOTHERAPY INDUCED NAUSEA AND VOMITING: Status: RESOLVED | Noted: 2023-02-07 | Resolved: 2023-02-20

## 2023-02-20 PROBLEM — R19.7 DIARRHEA OF PRESUMED INFECTIOUS ORIGIN: Status: RESOLVED | Noted: 2023-02-07 | Resolved: 2023-02-20

## 2023-02-20 PROBLEM — D61.818 ACQUIRED PANCYTOPENIA (HCC): Status: RESOLVED | Noted: 2022-06-03 | Resolved: 2023-02-20

## 2023-02-20 PROBLEM — Z91.89 AT RISK FOR NAUSEA AND VOMITING: Status: RESOLVED | Noted: 2022-11-15 | Resolved: 2023-02-20

## 2023-02-20 LAB
ANISOCYTOSIS BLD QL SMEAR: ABNORMAL
BACTERIA SPEC ANAEROBE CULT: ABNORMAL
BACTERIA WND AEROBE CULT: NORMAL
BACTERIA WND AEROBE CULT: NORMAL
BASOPHILS # BLD AUTO: 0 % (ref 0–1.8)
BASOPHILS # BLD: 0 K/UL (ref 0–0.12)
EOSINOPHIL # BLD AUTO: 0 K/UL (ref 0–0.51)
EOSINOPHIL NFR BLD: 0 % (ref 0–6.9)
ERYTHROCYTE [DISTWIDTH] IN BLOOD BY AUTOMATED COUNT: 51.8 FL (ref 35.9–50)
GRAM STN SPEC: NORMAL
GRAM STN SPEC: NORMAL
HCT VFR BLD AUTO: 23.6 % (ref 42–52)
HGB BLD-MCNC: 7.5 G/DL (ref 14–18)
HYPOCHROMIA BLD QL SMEAR: ABNORMAL
LYMPHOCYTES # BLD AUTO: 0.3 K/UL (ref 1–4.8)
LYMPHOCYTES NFR BLD: 6.9 % (ref 22–41)
MACROCYTES BLD QL SMEAR: ABNORMAL
MANUAL DIFF BLD: NORMAL
MCH RBC QN AUTO: 28.1 PG (ref 27–33)
MCHC RBC AUTO-ENTMCNC: 31.8 G/DL (ref 33.7–35.3)
MCV RBC AUTO: 88.4 FL (ref 81.4–97.8)
MICROCYTES BLD QL SMEAR: ABNORMAL
MONOCYTES # BLD AUTO: 0.19 K/UL (ref 0–0.85)
MONOCYTES NFR BLD AUTO: 4.3 % (ref 0–13.4)
MORPHOLOGY BLD-IMP: NORMAL
NEUTROPHILS # BLD AUTO: 3.91 K/UL (ref 1.82–7.42)
NEUTROPHILS NFR BLD: 86.2 % (ref 44–72)
NEUTS BAND NFR BLD MANUAL: 2.6 % (ref 0–10)
NRBC # BLD AUTO: 0.03 K/UL
NRBC BLD-RTO: 0.7 /100 WBC
PLATELET # BLD AUTO: 75 K/UL (ref 164–446)
PLATELET BLD QL SMEAR: NORMAL
PMV BLD AUTO: 10.6 FL (ref 9–12.9)
POIKILOCYTOSIS BLD QL SMEAR: NORMAL
POLYCHROMASIA BLD QL SMEAR: NORMAL
RBC # BLD AUTO: 2.67 M/UL (ref 4.7–6.1)
RBC BLD AUTO: PRESENT
SIGNIFICANT IND 70042: ABNORMAL
SIGNIFICANT IND 70042: ABNORMAL
SIGNIFICANT IND 70042: NORMAL
SIGNIFICANT IND 70042: NORMAL
SITE SITE: ABNORMAL
SITE SITE: ABNORMAL
SITE SITE: NORMAL
SITE SITE: NORMAL
SOURCE SOURCE: ABNORMAL
SOURCE SOURCE: ABNORMAL
SOURCE SOURCE: NORMAL
SOURCE SOURCE: NORMAL
TARGETS BLD QL SMEAR: NORMAL
WBC # BLD AUTO: 4.4 K/UL (ref 4.8–10.8)

## 2023-02-20 PROCEDURE — 700105 HCHG RX REV CODE 258: Performed by: PEDIATRICS

## 2023-02-20 PROCEDURE — 85025 COMPLETE CBC W/AUTO DIFF WBC: CPT

## 2023-02-20 PROCEDURE — 85007 BL SMEAR W/DIFF WBC COUNT: CPT

## 2023-02-20 PROCEDURE — 99233 SBSQ HOSP IP/OBS HIGH 50: CPT | Performed by: PEDIATRICS

## 2023-02-20 PROCEDURE — 700101 HCHG RX REV CODE 250: Performed by: PEDIATRICS

## 2023-02-20 PROCEDURE — 770003 HCHG ROOM/CARE - PEDIATRIC PRIVATE*

## 2023-02-20 PROCEDURE — 99024 POSTOP FOLLOW-UP VISIT: CPT | Performed by: PHYSICIAN ASSISTANT

## 2023-02-20 PROCEDURE — 700111 HCHG RX REV CODE 636 W/ 250 OVERRIDE (IP): Performed by: PEDIATRICS

## 2023-02-20 PROCEDURE — 700102 HCHG RX REV CODE 250 W/ 637 OVERRIDE(OP): Performed by: PEDIATRICS

## 2023-02-20 PROCEDURE — A9270 NON-COVERED ITEM OR SERVICE: HCPCS | Performed by: PEDIATRICS

## 2023-02-20 RX ORDER — KETOROLAC TROMETHAMINE 30 MG/ML
15 INJECTION, SOLUTION INTRAMUSCULAR; INTRAVENOUS EVERY 6 HOURS
Status: DISPENSED | OUTPATIENT
Start: 2023-02-20 | End: 2023-02-24

## 2023-02-20 RX ORDER — ONDANSETRON 4 MG/1
8 TABLET, ORALLY DISINTEGRATING ORAL EVERY 6 HOURS PRN
Status: DISCONTINUED | OUTPATIENT
Start: 2023-02-20 | End: 2023-03-01 | Stop reason: HOSPADM

## 2023-02-20 RX ADMIN — CEFEPIME 2 G: 2 INJECTION, POWDER, FOR SOLUTION INTRAVENOUS at 00:00

## 2023-02-20 RX ADMIN — METRONIDAZOLE 500 MG: 5 INJECTION, SOLUTION INTRAVENOUS at 13:07

## 2023-02-20 RX ADMIN — HYDROMORPHONE HYDROCHLORIDE: 10 INJECTION, SOLUTION INTRAMUSCULAR; INTRAVENOUS; SUBCUTANEOUS at 13:05

## 2023-02-20 RX ADMIN — ACETAMINOPHEN 650 MG: 325 TABLET, FILM COATED ORAL at 00:16

## 2023-02-20 RX ADMIN — IMATINIB MESYLATE 200 MG: 100 TABLET, FILM COATED ORAL at 06:08

## 2023-02-20 RX ADMIN — ONDANSETRON 8 MG: 2 INJECTION INTRAMUSCULAR; INTRAVENOUS at 00:00

## 2023-02-20 RX ADMIN — SULFAMETHOXAZOLE AND TRIMETHOPRIM 1 TABLET: 800; 160 TABLET ORAL at 06:07

## 2023-02-20 RX ADMIN — IMATINIB MESYLATE 400 MG: 400 TABLET, FILM COATED ORAL at 06:07

## 2023-02-20 RX ADMIN — DEXTROSE AND SODIUM CHLORIDE: 5; 450 INJECTION, SOLUTION INTRAVENOUS at 08:32

## 2023-02-20 RX ADMIN — FAMOTIDINE 20 MG: 20 TABLET ORAL at 06:07

## 2023-02-20 RX ADMIN — Medication 1000 UNITS: at 06:07

## 2023-02-20 RX ADMIN — KETOROLAC TROMETHAMINE 15 MG: 30 INJECTION, SOLUTION INTRAMUSCULAR; INTRAVENOUS at 11:50

## 2023-02-20 RX ADMIN — CEFEPIME 2 G: 2 INJECTION, POWDER, FOR SOLUTION INTRAVENOUS at 08:07

## 2023-02-20 RX ADMIN — ONDANSETRON 8 MG: 4 TABLET, ORALLY DISINTEGRATING ORAL at 17:32

## 2023-02-20 RX ADMIN — METRONIDAZOLE 500 MG: 5 INJECTION, SOLUTION INTRAVENOUS at 22:52

## 2023-02-20 RX ADMIN — METRONIDAZOLE 500 MG: 5 INJECTION, SOLUTION INTRAVENOUS at 06:07

## 2023-02-20 RX ADMIN — KETOROLAC TROMETHAMINE 15 MG: 30 INJECTION, SOLUTION INTRAMUSCULAR; INTRAVENOUS at 17:41

## 2023-02-20 RX ADMIN — FAMOTIDINE 20 MG: 20 TABLET ORAL at 17:41

## 2023-02-20 RX ADMIN — SULFAMETHOXAZOLE AND TRIMETHOPRIM 1 TABLET: 800; 160 TABLET ORAL at 17:40

## 2023-02-20 RX ADMIN — ONDANSETRON 8 MG: 2 INJECTION INTRAMUSCULAR; INTRAVENOUS at 08:06

## 2023-02-20 RX ADMIN — HYDROMORPHONE HYDROCHLORIDE: 10 INJECTION, SOLUTION INTRAMUSCULAR; INTRAVENOUS; SUBCUTANEOUS at 03:39

## 2023-02-20 RX ADMIN — APIXABAN 2.5 MG: 2.5 TABLET, FILM COATED ORAL at 06:08

## 2023-02-20 RX ADMIN — APIXABAN 2.5 MG: 2.5 TABLET, FILM COATED ORAL at 17:40

## 2023-02-20 RX ADMIN — CHLORHEXIDINE GLUCONATE 0.12% ORAL RINSE 15 ML: 1.2 LIQUID ORAL at 08:07

## 2023-02-20 ASSESSMENT — PAIN DESCRIPTION - PAIN TYPE
TYPE: SURGICAL PAIN
TYPE: ACUTE PAIN
TYPE: SURGICAL PAIN
TYPE: ACUTE PAIN

## 2023-02-20 NOTE — CARE PLAN
The patient is Watcher - Medium risk of patient condition declining or worsening    Shift Goals  Clinical Goals: Pain control  Patient Goals: Comfort, Rest  Family Goals: LELA    Progress made toward(s) clinical / shift goals:    Problem: Knowledge Deficit - Standard  Goal: Patient and family/care givers will demonstrate understanding of plan of care, disease process/condition, diagnostic tests and medications  Outcome: Progressing  Note: Patient updated on plan of care, all questions answered at this time.     Problem: Respiratory  Goal: Patient will achieve/maintain optimum respiratory ventilation and gas exchange  Outcome: Progressing     Problem: Nutrition - Standard  Goal: Patient's nutritional and fluid intake will be adequate or improve  Outcome: Progressing     Problem: Self Care  Goal: Patient will have the ability to perform ADLs independently or with assistance (bathe, groom, dress, toilet and feed)  Outcome: Progressing       Patient is not progressing towards the following goals:

## 2023-02-20 NOTE — PROGRESS NOTES
Patient is a 22 year old with leukemia undergoing chemotherapy who was admitted for neutropenic fever. He developed left shoulder pain with swelling. CT was performed which showed fluid within the shoulder joint as well as areas of pyomyositis. Patient was taken to the OR on 2/17/2023 for I&D of this. He is day #3 post op. He is on PCA dilaudid now with good pain control. He states he has tried not to press his button as much. He is now on flagyl after finalized cultures have returned. JULY states his shoulder is still painful with movement, but he states this has improved and his arm is less swollen.     Final culture results show Bacteroides fragilis growth.     Vital signs stable, afebrile     Physical Exam:   Left upper extremity:  Left wrist, fingers, elbow, & forearm full ROM without tenderness to palpation  Swelling of left arm from shoulder to hand noticeably improved since yesterday  Left shoulder with painful ROM  Tenderness to palpation anteriorly  Sensation intact to light touch throughout arm  Skin warm to the touch      Dressing clean, dry, and intact without drainage noted- changed this morning by nursing staff     Assessment: Status post Irrigation and debridement left shoulder infection done on 2/17/2023     Plan:  Continue new antibiotic regimen with flagyl  Continue current pain regimen  May use his left shoulder, but no heavy lifting  May continue elevating and icing arm to further reduce swelling  May shower when his drain hole has closed   We will follow along while he is an inpatient  He will need a wound check in 2-3 weeks after surgery if he is discharged.

## 2023-02-20 NOTE — PROGRESS NOTES
Pt demonstrates ability to turn self in bed without assistance of staff. Patient  understands importance in prevention of skin breakdown, ulcers, and potential infection. Hourly rounding in effect. RN skin check complete.   Devices in place include: R chest port, pulse ox, nasal cannula.  Skin assessed under devices: Yes.  Confirmed HAPI identified on the following date: NA   Location of HAPI: NA.  Wound Care RN following: No.  The following interventions are in place: Skin checked with each assessment, devices repositioned as needed.

## 2023-02-20 NOTE — CONSULTS
DATE OF SERVICE:  02/14/2023     HISTORY OF PRESENT ILLNESS:  The patient is a 22-year-old with a recent   diagnosis of leukemia.  He has been hospitalized for at least the last week   for fever and neutropenia.  A significant concern for the patient has been   ongoing tachycardia, which has been noted for at least about the last one   month, heart rates generally have been about 100-120.     The patient also has significant left shoulder pain.  Imaging demonstrates   significant inflammation, but no surgical issue was identified.     PAST MEDICAL HISTORY:  Prior to his diagnosis of leukemia, the patient   generally has been healthy.  No  significant prior medical issues.     FAMILY HISTORY:  There is no known family history of congenital heart disease.     PHYSICAL EXAMINATION:    GENERAL:  The patient is lying in bed.  He definitely appears to be a little   bit uncomfortable.  He states that his left shoulder is pretty painful.  He is   alert and interactive.  VITAL SIGNS:  His heart rate is approximately 105, respiratory rate is about   30.  HEENT:  His mucous membranes are pink and moist.  NECK:  Supple, no masses.  CHEST:  Symmetrical.  LUNGS:  Have fair to good aeration.  CARDIOVASCULAR:  Mildly hyperdynamic precordium.  He does have a II/VI   systolic murmur, loudest at the left sternal border.  No diastolic murmurs, no   gallops, no rubs.  Pulses are 2+ in the upper and lower extremities.  ABDOMEN:  Does not appear to be distended.  There is no organomegaly.  EXTREMITIES:  Warm and well perfused.     LABORATORY DATA:  An echocardiogram was repeated.  Comparing it to his   previous echocardiogram back on 01/05/2023, I really do not see any   significant difference.  His function is good by M-mode measurement on the   short axis view.  LV shortening fraction is at least 34%.  No valve   abnormalities appreciated.  There are no clots or vegetations.  Outflow tracts   are unobstructed.  There is no significant  pericardial effusion.  He does   have a left SVC to coronary sinus.     ASSESSMENT:  The patient is a 22-year-old with leukemia and is undergoing   chemotherapy treatment.  He also has been noted to have significant   inflammation of his left shoulder.  He generally has a reassuring cardiac   evaluation.  Again, I think his echocardiogram imaging today versus on   01/05/2023 are really unchanged.     PLAN:    1.  No SBE prophylaxis.  2.  No restrictions from a cardiac perspective at this time.  3.  Continue close monitoring, patient will have repeat echocardiograms in the   future.  An echocardiogram certainly can be done at any time if there are any   changes or ongoing or new concerns.     Thank you very much for allowing me involved in the care of the patient.              ______________________________  MD ESPERANZA CROSS/ISAAC    DD:  02/20/2023 06:06  DT:  02/20/2023 06:27    Job#:  [id number]

## 2023-02-21 LAB — IGG SERPL-MCNC: 525 MG/DL (ref 768–1632)

## 2023-02-21 PROCEDURE — 770003 HCHG ROOM/CARE - PEDIATRIC PRIVATE*

## 2023-02-21 PROCEDURE — A9270 NON-COVERED ITEM OR SERVICE: HCPCS | Performed by: PEDIATRICS

## 2023-02-21 PROCEDURE — 700111 HCHG RX REV CODE 636 W/ 250 OVERRIDE (IP): Performed by: PEDIATRICS

## 2023-02-21 PROCEDURE — 700105 HCHG RX REV CODE 258: Performed by: PEDIATRICS

## 2023-02-21 PROCEDURE — 99232 SBSQ HOSP IP/OBS MODERATE 35: CPT | Performed by: PEDIATRICS

## 2023-02-21 PROCEDURE — 700101 HCHG RX REV CODE 250: Performed by: PEDIATRICS

## 2023-02-21 PROCEDURE — 700102 HCHG RX REV CODE 250 W/ 637 OVERRIDE(OP): Performed by: PEDIATRICS

## 2023-02-21 RX ORDER — HEPARIN SODIUM (PORCINE) LOCK FLUSH IV SOLN 100 UNIT/ML 100 UNIT/ML
500 SOLUTION INTRAVENOUS
Status: DISCONTINUED | OUTPATIENT
Start: 2023-02-21 | End: 2023-03-01 | Stop reason: HOSPADM

## 2023-02-21 RX ORDER — HEPARIN SODIUM (PORCINE) LOCK FLUSH IV SOLN 100 UNIT/ML 100 UNIT/ML
500 SOLUTION INTRAVENOUS
Status: COMPLETED | OUTPATIENT
Start: 2023-02-21 | End: 2023-02-21

## 2023-02-21 RX ADMIN — APIXABAN 2.5 MG: 2.5 TABLET, FILM COATED ORAL at 19:18

## 2023-02-21 RX ADMIN — SULFAMETHOXAZOLE AND TRIMETHOPRIM 1 TABLET: 800; 160 TABLET ORAL at 06:09

## 2023-02-21 RX ADMIN — APIXABAN 2.5 MG: 2.5 TABLET, FILM COATED ORAL at 06:08

## 2023-02-21 RX ADMIN — HYDROMORPHONE HYDROCHLORIDE: 10 INJECTION, SOLUTION INTRAMUSCULAR; INTRAVENOUS; SUBCUTANEOUS at 11:44

## 2023-02-21 RX ADMIN — IMATINIB MESYLATE 400 MG: 400 TABLET, FILM COATED ORAL at 06:10

## 2023-02-21 RX ADMIN — HYDROMORPHONE HYDROCHLORIDE: 10 INJECTION, SOLUTION INTRAMUSCULAR; INTRAVENOUS; SUBCUTANEOUS at 00:39

## 2023-02-21 RX ADMIN — KETOROLAC TROMETHAMINE 15 MG: 30 INJECTION, SOLUTION INTRAMUSCULAR; INTRAVENOUS at 23:33

## 2023-02-21 RX ADMIN — KETOROLAC TROMETHAMINE 15 MG: 30 INJECTION, SOLUTION INTRAMUSCULAR; INTRAVENOUS at 11:43

## 2023-02-21 RX ADMIN — METRONIDAZOLE 500 MG: 5 INJECTION, SOLUTION INTRAVENOUS at 06:20

## 2023-02-21 RX ADMIN — IMATINIB MESYLATE 200 MG: 100 TABLET, FILM COATED ORAL at 06:15

## 2023-02-21 RX ADMIN — Medication 1 TABLET: at 06:09

## 2023-02-21 RX ADMIN — METRONIDAZOLE 500 MG: 5 INJECTION, SOLUTION INTRAVENOUS at 22:31

## 2023-02-21 RX ADMIN — FAMOTIDINE 20 MG: 20 TABLET ORAL at 06:09

## 2023-02-21 RX ADMIN — HYDROMORPHONE HYDROCHLORIDE: 10 INJECTION, SOLUTION INTRAMUSCULAR; INTRAVENOUS; SUBCUTANEOUS at 23:33

## 2023-02-21 RX ADMIN — HEPARIN 500 UNITS: 100 SYRINGE at 13:10

## 2023-02-21 RX ADMIN — METRONIDAZOLE 500 MG: 5 INJECTION, SOLUTION INTRAVENOUS at 14:17

## 2023-02-21 RX ADMIN — KETOROLAC TROMETHAMINE 15 MG: 30 INJECTION, SOLUTION INTRAMUSCULAR; INTRAVENOUS at 19:19

## 2023-02-21 RX ADMIN — Medication 1000 UNITS: at 06:10

## 2023-02-21 RX ADMIN — FAMOTIDINE 20 MG: 20 TABLET ORAL at 19:18

## 2023-02-21 ASSESSMENT — PAIN DESCRIPTION - PAIN TYPE
TYPE: ACUTE PAIN
TYPE: ACUTE PAIN;SURGICAL PAIN

## 2023-02-21 NOTE — PROGRESS NOTES
Pediatric Orthopedic Progress Note:    Luke Haddad M.D.    Patient ID:  Name:             Tomas Jean-Baptiste   YOB: 2000  Age:                 22 y.o.  male   MRN:               [id number]                                                     Subjective:  JULY was taken to the OR on 2/17/2023 and is now post op day #4 from I&D of his left arm and shoulder. His pain has gradually improved post-operatively, but his swelling has improved significantly.    Past medical history:   Past Medical History:   Diagnosis Date    Cancer (HCC)     Leukoma        Past surgical history:   Past Surgical History:   Procedure Laterality Date    INCISION AND DRAINAGE GENERAL Left 2/17/2023    Procedure: INCISION AND DRAINAGE OF LEFT SHOULDER;  Surgeon: Luke Haddad M.D.;  Location: SURGERY [facility];  Service: General    CATH PLACEMENT Right 8/29/2022    Procedure: INSERTION, CATHETER;  Surgeon: Simona Moise M.D.;  Location: SURGERY [facility];  Service: Ent       Family history:   Family History   Problem Relation Age of Onset    No Known Problems Mother     Stroke Maternal Grandmother     Diabetes Paternal Grandfather     Hypertension Paternal Grandfather     Hyperlipidemia Paternal Grandfather     Cancer Neg Hx     Heart Disease Neg Hx        Social history:   Social History     Socioeconomic History    Marital status: Single     Spouse name: Not on file    Number of children: Not on file    Years of education: Not on file    Highest education level: Not on file   Occupational History    Not on file   Tobacco Use    Smoking status: Never    Smokeless tobacco: Never   Vaping Use    Vaping Use: Never used   Substance and Sexual Activity    Alcohol use: Never    Drug use: Never    Sexual activity: Not Currently   Other Topics Concern    Behavioral problems Not Asked    Interpersonal relationships Not Asked    Sad or not enjoying activities Not Asked    Suicidal thoughts Not Asked    Poor school  performance Not Asked    Reading difficulties Not Asked    Speech difficulties Not Asked    Writing difficulties Not Asked    Inadequate sleep Not Asked    Excessive TV viewing Not Asked    Excessive video game use Not Asked    Inadequate exercise Not Asked    Sports related Not Asked    Poor diet Not Asked    Family concerns for drug/alcohol abuse Not Asked    Poor oral hygiene Not Asked    Bike safety Not Asked    Family concerns vehicle safety Not Asked   Social History Narrative    Not on file     Social Determinants of Health     Financial Resource Strain: Not on file   Food Insecurity: Not on file   Transportation Needs: Not on file   Physical Activity: Not on file   Stress: Not on file   Social Connections: Not on file   Intimate Partner Violence: Not on file   Housing Stability: Not on file       Current medications:   Current Facility-Administered Medications   Medication Dose    heparin lock flush 100 unit/mL injection 500 Units  500 Units    ketorolac (TORADOL) injection 15 mg  15 mg    ondansetron (ZOFRAN ODT) dispertab 8 mg  8 mg    HYDROmorphone (DILAUDID) 0.2 mg/mL in 50 mL NS (PCA)      apixaban (ELIQUIS) tablet 2.5 mg  2.5 mg    metroNIDAZOLE (Flagyl) IVPB 500 mg  500 mg    heparin lock flush 100 unit/mL injection 300-500 Units  300-500 Units    Pharmacy Consult Request ...Pain Management Review 1 Each  1 Each    acetaminophen (Tylenol) tablet 650 mg  650 mg    senna-docusate (PERICOLACE or SENOKOT S) 8.6-50 MG per tablet 1 Tablet  1 Tablet    lidocaine (LIDODERM) 5 % 1 Patch  1 Patch    LORazepam (ATIVAN) injection 0.5 mg  0.5 mg    lidocaine-prilocaine (EMLA) 2.5-2.5 % cream      D5 1/2 NS infusion      famotidine (PEPCID) tablet 20 mg  20 mg    imatinib (Gleevec) tablet 200 mg  200 mg    imatinib (Gleevec) tablet 400 mg  400 mg    therapeutic multivitamin-minerals (THERAGRAN-M) tablet 1 Tablet  1 Tablet    vitamin D3 (cholecalciferol) tablet 1,000 Units  1,000 Units       Allergies:  Allergies    Allergen Reactions    Amoxicillin Rash     Reacted as an infant. No swelling or airway problems.  Tolerated cefepime June 2022       Immunizations:  Immunization History   Administered Date(s) Administered    Dtap Vaccine 2000, 01/29/2002, 06/24/2002, 01/15/2003, 01/23/2006    HIB Vaccine (ACTHIB/HIBERIX) 2000, 01/29/2002, 09/25/2002    HPV Quadrivalent Vaccine (GARDASIL) - HISTORICAL DATA 12/15/2014, 02/17/2015, 06/03/2015    Hepatitis A Vaccine, Ped/Adol 10/03/2003, 01/23/2006    Hepatitis B Vaccine Adolescent/Pediatric 2001, 01/29/2002, 09/25/2002    IPV 2000, 01/29/2002, 06/24/2002, 01/23/2006    Influenza (IM) Preservative Free - HISTORICAL DATA 12/09/2011, 10/17/2013, 09/18/2015, 10/05/2016    Influenza Seasonal Injectable - Historical Data 10/12/2012    Influenza Vac Subunit Quad Inj (Pf) 11/08/2017    Influenza Vaccine Quad Inj (Pf) 11/17/2014, 09/24/2018, 09/27/2019    MMR Vaccine 09/25/2002, 01/15/2003    Meningococcal Conjugate Vaccine MCV4 (MENVEO) 09/24/2018    Meningococcal Conjugate Vaccine MCV4 (Menactra) 12/11/2012    Pneumococcal Vaccine (PCV7) - HISTORICAL DATA 2001, 03/08/2002, 06/24/2002    Tdap Vaccine 10/12/2012    Varicella Vaccine Live 09/25/2002, 01/17/2007       Physical examination:   Vitals:    02/20/23 1200 02/20/23 1615 02/20/23 2005 02/21/23 0750   BP: 126/74 108/64 117/71 102/56   Pulse: (!) 128 93 98 99   Resp: 16 14 16 14   Temp: 37.4 °C (99.4 °F) 36.8 °C (98.3 °F) 36.8 °C (98.3 °F) 37.2 °C (98.9 °F)   TempSrc: Temporal Temporal Temporal Temporal   SpO2: 96% 99% 96% 99%   Weight:       Height:         Physical Exam    Weak-appearing, but appropriate & conversive    Left upper extremity:  Left wrist, fingers, elbow, & forearm full ROM without tenderness to palpation  Swelling of left arm from shoulder to hand continuing to improve  Left shoulder with painful ROM  Tenderness to palpation anteriorly  Sensation intact to light touch throughout arm  Skin  warm to the touch     Dressing clean, dry, and intact without drainage noted    ANCILLARY DATA REVIEWED:     Lab Data Review:  Recent Labs     02/19/23  0558 02/20/23  0615   WBC 4.7* 4.4*   RBC 2.66* 2.67*   HEMOGLOBIN 7.6* 7.5*   HEMATOCRIT 23.4* 23.6*   MCV 88.0 88.4   MCH 28.6 28.1   MCHC 32.5* 31.8*   RDW 52.5* 51.8*   PLATELETCT 62* 75*   MPV 10.0 10.6                   MICRO:  Blood cultures from 2/9/2023 - no growth   Intraoperative cultures from 2/17/2023 - Bacteroides fragilis     Imaging  XR's left shoulder - negative for fracture or acute injury    MRI left shoulder - inflammation in muscles / myositis without focal collection or abscess; mild amount of joint fluid with fluid surrounding the biceps tendon; osseous changes, likely AVN vs. Myelodysplasia-related, no evidence of osteomyelitis or intra-osseous abscess    U/S left upper extremity - no evidence of DVT or fluid collection    CT left upper extremity with contrast - intra-capsular collection with pyomyositis of adjacent musculature; diffuse edema else where    ASSESSMENT AND PLAN:  Postop left arm & shoulder I&D  Continue current antibiotics - Flagyl & Bactrim   Encourage ROM of shoulder and upper extremity with elevation for swelling control  Will continue to follow    Luke Haddad M.D.  Pediatric Orthopedics and [facility]                    Physical Exam:      Assessment: Status post Irrigation and debridement left shoulder infection done on 2/17/2023     Plan:  Continue new antibiotic regimen with flagyl  Continue current pain regimen  May use his left shoulder, but no heavy lifting  May continue elevating and icing arm to further reduce swelling  May shower when his drain hole has closed   We will follow along while he is an inpatient  He will need a wound check in 2-3 weeks after surgery if he is discharged.

## 2023-02-21 NOTE — PROGRESS NOTES
Report received. Assumed care. Pt in bed sitting up talking. A/O x4. VSS. Responds appropriately. Complains of pain Left shoulder hooked to PCA pump, SOB. Assessment complete. Discussed POC, , pt verbalizes understanding. Explained importance of calling before getting OOB. Call light and belongings within reach. Bed alarm on. Bed in the lowest position. Treaded socks in place. Hourly rounding in progress. Will continue to monitor .

## 2023-02-21 NOTE — PROGRESS NOTES
Pediatric Hematology/Oncology  Daily Progress Note      Patient Name:  Tomas Jean-Baptiste  : 2000  MRN: [id number]    Location of Service:  [facility]  Date of Service: 2023  Time: 1:18 PM    Hospital Day: 15    Protocol / Treatment Plan: [place] Chromosome Precursor B-Cell Acute Lymphoblastic Leukemia, ON STUDY [id number], Delayed Intensification, Day 65    SUBJECTIVE:     No acute events overnight.  Afebrile Tmax 98.9 °F. Tomas Roy reports that he still has significant pain in his left arm however the pain has improved quite a bit he says as has the swelling in the tense feeling of his skin.  Has been using ice packs which bring him considerable relief.  This morning he is only pressed his on demand PCA button twice.  Button pushes over the past 24 hours down considerably.  JULY denies any other symptoms to include headaches, changes in vision or neurologic status changes.  He has no complaints of any nausea, vomiting, diarrhea or constipation.  No abdominal pain.  No adverse side effects of his Dilaudid PCA.  JULY reports that he is able to get up into the bathroom without any significant issues.  No other aches or pains.  No reported fevers or chills.  Does maintain oxygen requirement with desaturation into the 70s and 80s when off oxygen, currently on 2 L.  Performing incentive spirometry.  No other signs and symptoms of acute illness.  No additional concerns or complaints at this time.     Review of Systems:     Constitutional: Afebrile, overall feeling better.  Stable energy.  Stable appetite and oral intake.  HENT: Negative.  Eyes: Negative for visual changes.  Respiratory: Negative for shortness of breath.  Does however have a 2 L oxygen requirement with desaturations in the 70s and 80s off of oxygen while awake and while sleeping.  Cardiovascular: Negative.  Gastrointestinal: Negative for nausea, vomiting, abdominal pain, diarrhea,  "constipation.  Genitourinary: Negative.  Musculoskeletal: Left side arm pain as above.  No longer with right-sided chest wall pain.  Skin: Negative for rash or skin infection.  Well-healing surgical incision of left arm.  Neurological: Negative for numbness, tingling, sensory changes, weakness or headaches.    Endo/Heme/Allergies: No bruising/bleeding easily.    Psychiatric/Behavioral: Good mood.     OBJECTIVE:     Max Temp: Temp (24hrs), Av.9 °C (98.5 °F), Min:36.8 °C (98.3 °F), Max:37.2 °C (98.9 °F)    Vitals: /56   Pulse 99   Temp 37.2 °C (98.9 °F) (Temporal)   Resp 14   Ht 1.651 m (5' 5\")   Wt 66 kg (145 lb 8.1 oz)   SpO2 99%   BMI 24.21 kg/m²     I/O:   Intake/Output Summary (Last 24 hours) at 2023 1318  Last data filed at 2023 0720  Gross per 24 hour   Intake 102.45 ml   Output 300 ml   Net -197.55 ml     Labs:    Most Recent Hematology Labs:    Lab Results   Component Value Date/Time    WBC 4.4 (L) 2023 0615    HEMOGLOBIN 7.5 (L) 2023 0615    MCV 88.4 2023 0615    PLATELETCT 75 (L) 2023 0615    NEUTS 3.91 2023 0615       Most Recent Metabolic Panel:    Lab Results   Component Value Date/Time    POTASSIUM 3.7 2023 0340    CHLORIDE 105 2023 0340    CO2 24 2023 0340    GLUCOSE 110 (H) 2023 0340    BUN 6 (L) 2023 034    CREATININE 0.26 (L) 2023 034    CALCIUM 7.1 (L) 2023 034    ANION 8.0 2023 034     Physical Exam:    Constitutional: Overall well-appearing despite clinical condition.  On supplemental O2 by nasal cannula.  Tired but not toxic appearing.  HENT: Normocephalic and atraumatic. Alopecia.  No rhinorrhea. Oropharynx is clear and moist.   Eyes: Conjunctivae are normal. EOMI. nonicteric.  Neck: Normal range of motion of neck, no adenopathy.    Cardiovascular: Normal rate, regular rhythm.  No murmur. DP/radial pulses 2+, cap refill < 2 sec.  Pulmonary/Chest: Effort normal. No respiratory distress. " Symmetric expansion.  No crackles or wheezes.  2 L nasal cannula in place.  Abdomen: Soft. Bowel sounds are normal. No distension and no mass. There is no hepatosplenomegaly.    Genitourinary:  Deferred.  Musculoskeletal: Range of motion not assessed in left arm due to pain.  Swelling down from previous examination at the beginning of illness however still with considerable asymmetric swelling on left side.  No other musculoskeletal findings.  Neurological: Alert and oriented to person and place.  Gait not assessed.    Skin: Skin is warm, dry and pink.  No rash or evidence of skin infection.   Psychiatric: Mood good.    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 and whose End of Induction IB MRD was both negative by flow cytometry and PCR who was admitted at Day 50 of Delayed Intensification with fever and neutropenia found to have a left shoulder joint and soft tissue infection with Bacteriodes fragilis     1) Soft Tissue Infection / Joint Infection with Bacteroides fragilis:  - Edema and pain continue to improve  - X-ray L shoulder 2/8/2023: not concerning  - MRI shoulder 2/10/2023: showing diffuse myositis and some intermuscular edema/fluid  - No evidence of DVT  - Was initially on cefepime for F&N, added IV Vancomycin on 2/10/2023, discontinued both cefepime and vancomycin on 2/20/2023 after start of IV Flagyl (below)  - S/P open exploration/drainage 2/17/2023  - Culture: B.fragilis (both deep and superficial cultures)   - ID Consultation with Dr. Singh - recommendation for swithc to metronidazole for 4+ weeks  - Flagyl 500 mg IV Q8 hours (Day 3)  - Awaiting total IgG, consider IVIG supplementation    2) Left Shoulder Pain:  - Improved pain overnight  - Decreased boluses from PCA  - Current PCA settings: Dilaudid basal rate 0.5 mg/hr, bolus dose to 0.4 mg  - As pain is just controlled, will  "not make any changes today    3) Febrile Neutropenia in Immunocompromised Host: (RESOLVED)  - Previous history of gram negative septic shock  - Blood culture from port 2/7/2023: NGTD  - Repeat blood culture from port 2/9/2023: NGTD  - IV Cefepime every 8 hrs (Discontinued 2/20/2023)  - IV Vancomycin started on 2/10/2023 (Discontinued 2/20/2023)     4) Tachycardia:   - Some PVCs noted on tele but hemodynamically stable  - Recently admitted with severe septic shock, hence consider cardiomyopathy  - Consulted cardiology (Dr Galvez): Holter monitor and repeat ECHO essentially unremarkable  - No additional interventions  - As orthostasis and deconditioning are consideration, will order PT OT today     5) At Risk of Opioid Induced Constipation:   - senna-docusate BID  - Encouraged ambulation as tolerated.  - Stooling regularly     6) Retrosternal Chest Pain:  - Likely GERD  - Continue famotidine 20 mg BID  - Continue to monitor     7) Hypoxemia:   - Unable to wean as Minerva desaturates to the 70s and 80s when taken off oxygen   - Wean as tolerated   - Continue Incentive Spirometry    - Ambulation as tolerated   - Up and to chair as tolerated    8) Ph+ Precursor B-Cell Acute Lymphoblastic Leukemia, in MRD Remission:     [id number], AR Arm B, Delayed Intensification, Day 65: THERAPY COMPLETE   ** \"Interim Maintenance\" ON HOLD pending improvement (will likely be delayed by 12-14 days)  ** Continue imatinib 600 mg PO daily                     9) Chemotherapy Related Pancytopenia:  - WBC 4.4, Hgb 7.5, platelets 75  - ANC 3910, ALC 30  - No transfusion indicated today  - Transfused pRBCs last on 2/16/2023  - Transfuse for hemoglobin less than 7.5 or symptomatic  - Transfuse for platelets less than 10,000 or symptomatic  - CBC every Monday and Thursday     10) At Risk for DVT Secondary to PEG Asparaginase: (LESSENING)  - Platelets >50k  - Currently on apixaban 2.5 mg PO BID   - No 23 days out from PEG Asparaginase - low risk for " therapy related clot however still with low protein and globulin   - Also at risk given acute illness and lack of ambulation   - Will discontinue when ambulating regularly     11) At Risk of Opportunistic Lung Infection:  - Bactrim DS PO BID Sat and Sun for PJP prophylaxis     12) Central Access:   - R Port-A-Cath in place      Research Participant:           [facility] [facility] - Source Data         Diagnosis: Ph+ Precursor B-Cell Acute Lymphoblastic Leukemia     Disease Status: EOI1A MRD NEGATIVE, EOI1B RD NEGATIVE, CNS3c, testicular negative, HSV1 IgG POSITIVE, CMV IgG NEGATIVE, VARICELLA IgG POSITIVE     Active Studies: [id number], [id number]                                                                                                      Inactive Studies: [id number]                                                                                                                                                Optional Studies: None             Protocol: International Phase 3 trial in [place] 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      Treatment Plan: [id number](OS), AR-Arm B, Delayed Intensification, Day 64     Height: 1.650 m      Weight: 64.2kg       BSA: 1.72 m²   (Delayed Intensification Day 29 1/17/2023)                                                                                                                                           Performance Status: Karnofsky 50, ECOG  3 (2/21/2023)     Treatment Plan Medications:  (100% adherent with imatinib)     Imatinib dose adjusted for weight at DI, Day 29 to Imatinib 600 mg PO daily in AM     Evaluations / Study Labs:  (2/21/2023)     None required     Therapy Given: (2/21/2023)    Imatinib 600 mg PO QAM     Toxicities:    **Grade 3 febrile neutropenia on 2/7/2023 (ANC <1000/mm3 with a single temperature of >38.3 degrees C (101 degrees F) or a  sustained temperature of >=38 degrees C (100.4 degrees F) for more than one hour)  ** Grade 3 diarrhea on 2/6/2023 (Increase of >=7 stools per day  over baseline; hospitalization indicated, limiting self care ADL): Resolved 2/7/2023  ** Grade 2 diarrhea on 2/7/2023 (Increase of 4 - 6 stools per day  over baseline; limiting instrumental ADL) Resolved 2/8/2023  ** Grade 3 vomiting on 2/6/2023 and 2/7/2023 (hospitalization indicated) Resolved 2/8/2023  ** Grade 2 Myositis (Moderate pain associated with weakness; pain limiting, instrumental ADL): Present on admission on 2/7/2023  ** Grade 3 Hypoxemia (Decreased oxygen saturation at rest (e.g., pulse oximeter  <88% or PaO2 <=55 mm Hg), started 2/10/2023  ** Grade 3 soft tissue infection (invasive intervention indicated)         Disposition: Inpatient for treatment of left should infection and pain    Pepe Faye MD  Pediatric Hematology / Oncology  [facility].  [phone]  [facility]. [phone]

## 2023-02-21 NOTE — PROGRESS NOTES
Report received from MONTSERRAT Millan. Resumed care at this time. Call light and personal belongings with in reach.

## 2023-02-22 LAB
ABO GROUP BLD: NORMAL
BARCODED ABORH UBTYP: 5100
BARCODED PRD CODE UBPRD: NORMAL
BARCODED UNIT NUM UBUNT: NORMAL
BLD GP AB SCN SERPL QL: NORMAL
COMPONENT R 8504R: NORMAL
PRODUCT TYPE UPROD: NORMAL
RH BLD: NORMAL
UNIT STATUS USTAT: NORMAL

## 2023-02-22 PROCEDURE — 700102 HCHG RX REV CODE 250 W/ 637 OVERRIDE(OP): Performed by: PEDIATRICS

## 2023-02-22 PROCEDURE — 97163 PT EVAL HIGH COMPLEX 45 MIN: CPT

## 2023-02-22 PROCEDURE — 86923 COMPATIBILITY TEST ELECTRIC: CPT

## 2023-02-22 PROCEDURE — 86850 RBC ANTIBODY SCREEN: CPT

## 2023-02-22 PROCEDURE — 97535 SELF CARE MNGMENT TRAINING: CPT

## 2023-02-22 PROCEDURE — 36430 TRANSFUSION BLD/BLD COMPNT: CPT

## 2023-02-22 PROCEDURE — 700101 HCHG RX REV CODE 250: Performed by: PEDIATRICS

## 2023-02-22 PROCEDURE — 700111 HCHG RX REV CODE 636 W/ 250 OVERRIDE (IP): Performed by: PEDIATRICS

## 2023-02-22 PROCEDURE — 86945 BLOOD PRODUCT/IRRADIATION: CPT

## 2023-02-22 PROCEDURE — 86900 BLOOD TYPING SEROLOGIC ABO: CPT

## 2023-02-22 PROCEDURE — 86901 BLOOD TYPING SEROLOGIC RH(D): CPT

## 2023-02-22 PROCEDURE — A9270 NON-COVERED ITEM OR SERVICE: HCPCS | Performed by: PEDIATRICS

## 2023-02-22 PROCEDURE — 36415 COLL VENOUS BLD VENIPUNCTURE: CPT

## 2023-02-22 PROCEDURE — 700105 HCHG RX REV CODE 258: Performed by: PEDIATRICS

## 2023-02-22 PROCEDURE — 99232 SBSQ HOSP IP/OBS MODERATE 35: CPT | Performed by: PEDIATRICS

## 2023-02-22 PROCEDURE — 770003 HCHG ROOM/CARE - PEDIATRIC PRIVATE*

## 2023-02-22 PROCEDURE — P9016 RBC LEUKOCYTES REDUCED: HCPCS

## 2023-02-22 RX ADMIN — ONDANSETRON 8 MG: 4 TABLET, ORALLY DISINTEGRATING ORAL at 12:01

## 2023-02-22 RX ADMIN — KETOROLAC TROMETHAMINE 15 MG: 30 INJECTION, SOLUTION INTRAMUSCULAR; INTRAVENOUS at 23:10

## 2023-02-22 RX ADMIN — METRONIDAZOLE 500 MG: 5 INJECTION, SOLUTION INTRAVENOUS at 06:28

## 2023-02-22 RX ADMIN — IMATINIB MESYLATE 400 MG: 400 TABLET, FILM COATED ORAL at 06:26

## 2023-02-22 RX ADMIN — METRONIDAZOLE 500 MG: 5 INJECTION, SOLUTION INTRAVENOUS at 14:54

## 2023-02-22 RX ADMIN — APIXABAN 2.5 MG: 2.5 TABLET, FILM COATED ORAL at 18:18

## 2023-02-22 RX ADMIN — DEXTROSE AND SODIUM CHLORIDE: 5; 450 INJECTION, SOLUTION INTRAVENOUS at 01:03

## 2023-02-22 RX ADMIN — FAMOTIDINE 20 MG: 20 TABLET ORAL at 18:18

## 2023-02-22 RX ADMIN — DEXTROSE AND SODIUM CHLORIDE 1000 ML: 5; 450 INJECTION, SOLUTION INTRAVENOUS at 19:49

## 2023-02-22 RX ADMIN — IMATINIB MESYLATE 200 MG: 100 TABLET, FILM COATED ORAL at 06:27

## 2023-02-22 RX ADMIN — KETOROLAC TROMETHAMINE 15 MG: 30 INJECTION, SOLUTION INTRAMUSCULAR; INTRAVENOUS at 06:26

## 2023-02-22 RX ADMIN — FAMOTIDINE 20 MG: 20 TABLET ORAL at 06:30

## 2023-02-22 RX ADMIN — KETOROLAC TROMETHAMINE 15 MG: 30 INJECTION, SOLUTION INTRAMUSCULAR; INTRAVENOUS at 18:19

## 2023-02-22 RX ADMIN — Medication 1000 UNITS: at 06:26

## 2023-02-22 RX ADMIN — HYDROMORPHONE HYDROCHLORIDE: 10 INJECTION, SOLUTION INTRAMUSCULAR; INTRAVENOUS; SUBCUTANEOUS at 16:11

## 2023-02-22 RX ADMIN — KETOROLAC TROMETHAMINE 15 MG: 30 INJECTION, SOLUTION INTRAMUSCULAR; INTRAVENOUS at 11:34

## 2023-02-22 RX ADMIN — APIXABAN 2.5 MG: 2.5 TABLET, FILM COATED ORAL at 06:26

## 2023-02-22 RX ADMIN — METRONIDAZOLE 500 MG: 5 INJECTION, SOLUTION INTRAVENOUS at 21:29

## 2023-02-22 ASSESSMENT — PAIN DESCRIPTION - PAIN TYPE
TYPE: ACUTE PAIN

## 2023-02-22 ASSESSMENT — COGNITIVE AND FUNCTIONAL STATUS - GENERAL
SUGGESTED CMS G CODE MODIFIER MOBILITY: CK
MOVING FROM LYING ON BACK TO SITTING ON SIDE OF FLAT BED: A LITTLE
TURNING FROM BACK TO SIDE WHILE IN FLAT BAD: A LITTLE
WALKING IN HOSPITAL ROOM: A LITTLE
STANDING UP FROM CHAIR USING ARMS: A LITTLE
MOBILITY SCORE: 18
CLIMB 3 TO 5 STEPS WITH RAILING: A LITTLE
MOVING TO AND FROM BED TO CHAIR: A LITTLE

## 2023-02-22 ASSESSMENT — GAIT ASSESSMENTS
GAIT LEVEL OF ASSIST: STANDBY ASSIST
ASSISTIVE DEVICE: OTHER (COMMENTS)

## 2023-02-22 NOTE — DIETARY
Nutrition Services Brief Update:  Day 15 of admit.  Tomas Jean-Baptiste is a 22 y.o. male with admitting DX of ALL (acute lymphoid leukemia) in remission (HCC) [C91.01]    Current Diet: Regular + boost VHC TID. Meals brought in from home. No trays provided from our kitchen per patient preferences. Per RN foods continue to be brought in from home and no nutrition concerns at this time.     Problem: Nutritional:  Goal: Achieve adequate nutritional intake  Description: Patient will consume >50% of meals  Outcome: Progressing    Requested RN to obtain stand-up scale weight as able.

## 2023-02-22 NOTE — THERAPY
Physical Therapy   Initial Evaluation     Patient Name: Tomas Jean-Baptiste  Age:  22 y.o., Sex:  male  Medical Record #: [id number]  Today's Date: 2/22/2023     Precautions  Precautions: Fall Risk  Comments: Low hemoglobin today,  initermittent hyptotension    Assessment  Patient is 22 y.o. male admitted to the hospital for fever, chills and L UE pain and dehydration. Pt was at the infusion clinic for his scheduled chemo and was admitted. Pt with history of B-cell lymphoblastic leukemia. MRI of L UE revealed diffuse myositis and intermuscular edema. Pt did undergo I&D of L shoulder yesterday and is allowed to perform ROM As tolerated but no heavy lifting to L UE. Pt reports that prior to this admission, he was doing well at home but complains of significant fatigue and resolving L LE pain from prior admission in January.   Pt is able to perform bed mobility, sit to stand transfers and ambulation with SPV in room. Pt did demonstrate tachycardia with mobility (HR in the 130's seated EOB and high 140's post mobility) /38 while seated EOB. Pt also demonstrated mild O2 desaturations following using restroom and walking short distance in room (high 80's, several minutes to recover to 90's.) Assess B UE and LE strength. Pt's R LE strength 4+/5, L LE strength 4-/5 to hip flexor and dorsiflexor and 4+/5 otherwise. R UE strength 4/5 with tremors present in R hand. L UE strength testing deferred due to significant swelling and pain. Pt with significant edema in L UE limiting function. He is able to actively move fingers and wrist throughout partial ROM. Trace elbow flexion present but did tolerate passively being moved to about 90 degrees. Shoulder flexion and abduction to approximately 90 degrees as well. Pt with significant muscles guarding and pain with movement. Educated on shoulder shrugs, shoulder rolls and scap squeezes to start to improve muscle activation of L LE. At end of session, L EU elevated on  "pillows to help decrease edema. Recommend OT consult as well     Plan    Physical Therapy Initial Treatment Plan   Treatment Plan : (P) Gait Training, Neuro Re-Education / Balance, Therapeutic Activities, Therapeutic Exercise, Self Care / Home Evaluation  Treatment Frequency: (P) 3 Times per Week  Duration: (P) Until Therapy Goals Met                02/22/23 1005   Vitals   Pulse (!) 130   Blood Pressure (!) 120/38   Pain 0 - 10 Group   Location Axilla;Arm;Shoulder   Location Orientation Left   Therapist Pain Assessment 6;Post Activity Pain Same as Prior to Activity;Nurse Notified   Non Verbal Descriptors   Non Verbal Scale  Crying;Grimacing   Prior Living Situation   Prior Services Intermittent Physical Support for ADL Per Family   Housing / Facility 1 Story House   Steps Into Home 2   Bathroom Set up Shower Chair;Bathtub / Shower Combination   Equipment Owned 4-Wheel Walker;Tub / Shower Seat   Lives with - Patient's Self Care Capacity Parents   Comments Pt lives with mom who works from home so is available majority of the time to assist with care   Prior Level of Functional Mobility   Bed Mobility Independent   Transfer Status Independent   Ambulation Independent   Assistive Devices Used None   Comments PT has intermittently used 4WW in the past, not recently   History of Falls   History of Falls   (recent syncopal episode)   Cognition    Cognition / Consciousness WDL   Level of Consciousness Alert   Comments Pt very pleasant, cooperative and willing to work hard to get better. Pt crying during session, expressing frustration with current condition and diagnosis. States \"I'm sick of feeling this way.\"   Passive ROM Upper Body   Passive ROM Upper Body X   Comments L UE PROM limited by guarding and pain. Abduction and flexion to approximately 90 degrees   Active ROM Upper Body   Active ROM Upper Body  X   Comments L UE limited by pain and edema   Strength Upper Body   Upper Body Strength  X   Comments R UE strength " 4/5, Very limited L UE strength due to pain and swelling   Passive ROM Lower Body   Passive ROM Lower Body WDL   Active ROM Lower Body    Active ROM Lower Body  WDL   Strength Lower Body   Lower Body Strength  X   Comments 4+/5 R LE in all muscle groups, 4-/5 L hip flexion and dorsiflexor, otherwise 4+/5. Pt reports feeling functionally weaker on the L LE vs R   Sensation Lower Body   Lower Extremity Sensation   WDL   Coordination Upper Body   Comments L UE coordination limited by pain and swelling. R UE with tremor   Balance Assessment   Sitting Balance (Static) Fair +   Sitting Balance (Dynamic) Fair +   Standing Balance (Static) Fair   Standing Balance (Dynamic) Fair   Weight Shift Sitting Good   Weight Shift Standing Fair   Comments Standing balance using IV pole for support   Bed Mobility    Supine to Sit Supervised   Sit to Supine Supervised   Scooting Supervised   Rolling Supervised   Gait Analysis   Gait Level Of Assist Standby Assist   Assistive Device Other (Comments)  (pushing IV pole)   # of Times Distance was Traveled 1   Weight Bearing Status No restrictions   Comments Pt ambulated short distance in room only. Pt limited by low hemoglobin (will be receiving transfusion later today), elevated HR with decreased activity and low diastolic BP   Functional Mobility   Sit to Stand Supervised   Bed, Chair, Wheelchair Transfer Supervised   Toilet Transfers Supervised   Mobility Ambulated in room only   How much difficulty does the patient currently have...   Turning over in bed (including adjusting bedclothes, sheets and blankets)? 3   Sitting down on and standing up from a chair with arms (e.g., wheelchair, bedside commode, etc.) 3   Moving from lying on back to sitting on the side of the bed? 3   How much help from another person does the patient currently need...   Moving to and from a bed to a chair (including a wheelchair)? 3   Need to walk in a hospital room? 3   Climbing 3-5 steps with a railing? 3   6  clicks Mobility Score 18   Activity Tolerance   Sitting in Chair 20+, up in chair at end of session   Sitting Edge of Bed 5   Standing 8 min   Comments Limited by low Hgb, fatigue and pain   Patient / Family Goals    Patient / Family Goal #1 Home   Short Term Goals    Short Term Goal # 1 Pt will ambulate 150 feet with no AD with no overt LOB or gait deviations with HR in the low 100's prior to DC to improve activity tolerance at home   Short Term Goal # 2 Pt will demonstrate improvements in AROM And PROM of L UE prior to DC to improve functional use of L UE   Short Term Goal # 3 Pt will be independent with HEP prior to DC to optimize recovery upon DC   Education Group   Education Provided Role of Physical Therapist;Exercises - Seated   Role of Physical Therapist Patient Response Patient;Family;Acceptance;Explanation;Verbal Demonstration   Exercises - Seated Patient Response Patient;Family;Acceptance;Explanation;Verbal Demonstration   Physical Therapy Initial Treatment Plan    Treatment Plan  Gait Training;Neuro Re-Education / Balance;Therapeutic Activities;Therapeutic Exercise;Self Care / Home Evaluation   Treatment Frequency 3 Times per Week   Duration Until Therapy Goals Met

## 2023-02-22 NOTE — CARE PLAN
The patient is Watcher - Medium risk of patient condition declining or worsening    Shift Goals  Clinical Goals: Pain Control  Patient Goals: Pain Control, Sleep comfortably  Family Goals: N/A    Progress made toward(s) clinical / shift goals:  Patients pain level has maintain at  tolerable level.      Problem: Knowledge Deficit - Standard  Goal: Patient and family/care givers will demonstrate understanding of plan of care, disease process/condition, diagnostic tests and medications  Outcome: Progressing  Patient updated on plan of care and verbalized understanding. No questions a this time.     Problem: Self Care  Goal: Patient will have the ability to perform ADLs independently or with assistance (bathe, groom, dress, toilet and feed)  Outcome: Progressing  Patient was able to bathe and dress self.    Pt demonstrates ability to turn self in bed without assistance of staff. Patient and understands importance in prevention of skin breakdown, ulcers, and potential infection. Hourly rounding in effect. RN skin check complete.   Devices in place include: Right chest port, SCD's, Pulse ox.  Skin assessed under devices: Yes.  Confirmed HAPI identified on the following date: NA   Location of HAPI: NA.  Wound Care RN following: No.  The following interventions are in place: Skin assessed q4 hours. Pulse ox sites rotated as needed.

## 2023-02-23 LAB
BASOPHILS # BLD AUTO: 0 % (ref 0–1.8)
BASOPHILS # BLD: 0 K/UL (ref 0–0.12)
EOSINOPHIL # BLD AUTO: 0 K/UL (ref 0–0.51)
EOSINOPHIL NFR BLD: 0 % (ref 0–6.9)
ERYTHROCYTE [DISTWIDTH] IN BLOOD BY AUTOMATED COUNT: 53.5 FL (ref 35.9–50)
HCT VFR BLD AUTO: 28.9 % (ref 42–52)
HGB BLD-MCNC: 9.4 G/DL (ref 14–18)
IMM GRANULOCYTES # BLD AUTO: 0.07 K/UL (ref 0–0.11)
IMM GRANULOCYTES NFR BLD AUTO: 1.7 % (ref 0–0.9)
LYMPHOCYTES # BLD AUTO: 0.34 K/UL (ref 1–4.8)
LYMPHOCYTES NFR BLD: 8.5 % (ref 22–41)
MCH RBC QN AUTO: 27.8 PG (ref 27–33)
MCHC RBC AUTO-ENTMCNC: 32.5 G/DL (ref 33.7–35.3)
MCV RBC AUTO: 85.5 FL (ref 81.4–97.8)
MONOCYTES # BLD AUTO: 0.34 K/UL (ref 0–0.85)
MONOCYTES NFR BLD AUTO: 8.5 % (ref 0–13.4)
NEUTROPHILS # BLD AUTO: 3.27 K/UL (ref 1.82–7.42)
NEUTROPHILS NFR BLD: 81.3 % (ref 44–72)
NRBC # BLD AUTO: 0 K/UL
NRBC BLD-RTO: 0 /100 WBC
PLATELET # BLD AUTO: 120 K/UL (ref 164–446)
PMV BLD AUTO: 10.2 FL (ref 9–12.9)
RBC # BLD AUTO: 3.38 M/UL (ref 4.7–6.1)
WBC # BLD AUTO: 4 K/UL (ref 4.8–10.8)

## 2023-02-23 PROCEDURE — A9270 NON-COVERED ITEM OR SERVICE: HCPCS | Performed by: PEDIATRICS

## 2023-02-23 PROCEDURE — 700105 HCHG RX REV CODE 258: Performed by: PEDIATRICS

## 2023-02-23 PROCEDURE — 700111 HCHG RX REV CODE 636 W/ 250 OVERRIDE (IP): Performed by: PEDIATRICS

## 2023-02-23 PROCEDURE — 770003 HCHG ROOM/CARE - PEDIATRIC PRIVATE*

## 2023-02-23 PROCEDURE — 700101 HCHG RX REV CODE 250: Performed by: PEDIATRICS

## 2023-02-23 PROCEDURE — 85025 COMPLETE CBC W/AUTO DIFF WBC: CPT

## 2023-02-23 PROCEDURE — 700102 HCHG RX REV CODE 250 W/ 637 OVERRIDE(OP): Performed by: PEDIATRICS

## 2023-02-23 PROCEDURE — 99024 POSTOP FOLLOW-UP VISIT: CPT | Performed by: PHYSICIAN ASSISTANT

## 2023-02-23 PROCEDURE — 99232 SBSQ HOSP IP/OBS MODERATE 35: CPT | Performed by: PEDIATRICS

## 2023-02-23 RX ORDER — CHLORHEXIDINE GLUCONATE ORAL RINSE 1.2 MG/ML
15 SOLUTION DENTAL 2 TIMES DAILY
Status: DISCONTINUED | OUTPATIENT
Start: 2023-02-23 | End: 2023-03-01 | Stop reason: HOSPADM

## 2023-02-23 RX ADMIN — KETOROLAC TROMETHAMINE 15 MG: 30 INJECTION, SOLUTION INTRAMUSCULAR; INTRAVENOUS at 11:50

## 2023-02-23 RX ADMIN — KETOROLAC TROMETHAMINE 15 MG: 30 INJECTION, SOLUTION INTRAMUSCULAR; INTRAVENOUS at 18:17

## 2023-02-23 RX ADMIN — METRONIDAZOLE 500 MG: 5 INJECTION, SOLUTION INTRAVENOUS at 13:58

## 2023-02-23 RX ADMIN — CHLORHEXIDINE GLUCONATE 15 ML: 1.2 RINSE ORAL at 11:50

## 2023-02-23 RX ADMIN — METRONIDAZOLE 500 MG: 5 INJECTION, SOLUTION INTRAVENOUS at 06:09

## 2023-02-23 RX ADMIN — IMATINIB MESYLATE 200 MG: 100 TABLET, FILM COATED ORAL at 06:14

## 2023-02-23 RX ADMIN — METRONIDAZOLE 500 MG: 5 INJECTION, SOLUTION INTRAVENOUS at 22:38

## 2023-02-23 RX ADMIN — DEXTROSE AND SODIUM CHLORIDE: 5; 450 INJECTION, SOLUTION INTRAVENOUS at 06:09

## 2023-02-23 RX ADMIN — SENNOSIDES AND DOCUSATE SODIUM 1 TABLET: 50; 8.6 TABLET ORAL at 06:10

## 2023-02-23 RX ADMIN — HYDROMORPHONE HYDROCHLORIDE: 10 INJECTION, SOLUTION INTRAMUSCULAR; INTRAVENOUS; SUBCUTANEOUS at 02:45

## 2023-02-23 RX ADMIN — FAMOTIDINE 20 MG: 20 TABLET ORAL at 18:16

## 2023-02-23 RX ADMIN — CHLORHEXIDINE GLUCONATE 15 ML: 1.2 RINSE ORAL at 20:47

## 2023-02-23 RX ADMIN — FAMOTIDINE 20 MG: 20 TABLET ORAL at 06:10

## 2023-02-23 RX ADMIN — APIXABAN 2.5 MG: 2.5 TABLET, FILM COATED ORAL at 18:16

## 2023-02-23 RX ADMIN — IMATINIB MESYLATE 400 MG: 400 TABLET, FILM COATED ORAL at 06:13

## 2023-02-23 RX ADMIN — HYDROMORPHONE HYDROCHLORIDE: 10 INJECTION, SOLUTION INTRAMUSCULAR; INTRAVENOUS; SUBCUTANEOUS at 19:01

## 2023-02-23 RX ADMIN — HYDROMORPHONE HYDROCHLORIDE: 10 INJECTION, SOLUTION INTRAMUSCULAR; INTRAVENOUS; SUBCUTANEOUS at 13:59

## 2023-02-23 RX ADMIN — KETOROLAC TROMETHAMINE 15 MG: 30 INJECTION, SOLUTION INTRAMUSCULAR; INTRAVENOUS at 06:10

## 2023-02-23 RX ADMIN — Medication 1000 UNITS: at 06:10

## 2023-02-23 RX ADMIN — DEXTROSE AND SODIUM CHLORIDE: 5; 450 INJECTION, SOLUTION INTRAVENOUS at 18:18

## 2023-02-23 RX ADMIN — APIXABAN 2.5 MG: 2.5 TABLET, FILM COATED ORAL at 06:10

## 2023-02-23 ASSESSMENT — PAIN DESCRIPTION - PAIN TYPE
TYPE: ACUTE PAIN

## 2023-02-23 ASSESSMENT — FIBROSIS 4 INDEX: FIB4 SCORE: 1.33

## 2023-02-23 NOTE — CARE PLAN
The patient is Watcher - Medium risk of patient condition declining or worsening    Shift Goals  Clinical Goals: pain management, wean o2  Patient Goals: rest  Family Goals: n/a    Progress made toward(s) clinical / shift goals:  pain managed with PCA      Problem: Knowledge Deficit - Standard  Goal: Patient and family/care givers will demonstrate understanding of plan of care, disease process/condition, diagnostic tests and medications  Outcome: Progressing     Problem: Fluid Volume  Goal: Fluid volume balance will be maintained  Outcome: Progressing     Problem: Fall Risk  Goal: Patient will remain free from falls  Outcome: Progressing     Problem: Pain - Standard  Goal: Alleviation of pain or a reduction in pain to the patient’s comfort goal  Outcome: Progressing

## 2023-02-23 NOTE — PROGRESS NOTES
Pediatric Hematology/Oncology  Daily Progress Note      Patient Name:  Tomas Jean-Baptiste  : 2000  MRN: [id number]    Location of Service:  [facility]  Date of Service: 2023  Time: 10:20 PM    Hospital Day: 16    Protocol / Treatment Plan: [place] Chromosome Precursor B-Cell Acute Lymphoblastic Leukemia, ON STUDY [id number], Delayed Intensification, Day 65    SUBJECTIVE:     No acute events overnight.  Afebrile Tmax 98.5 °F.  This morning, JULY reports that overall he is not feeling as well as he did yesterday.  He reports that he feels a large component to this is being homesick.  No complaints of any headaches, changes in vision or neurologic status changes.  Not complaining of any significant pain currently as left arm has improved greatly.  Current PCA dosing is adequate to control pain.  Approximately 30 boluses given over the past 24 hours.  The pain will spike as high as a 7 on a scale of 10 but will improved to 4 with PCA delivery.  No constipation or diarrhea.  Stooling normally.  No easy bleeding or bruising.  No other concerns or complaints.    Review of Systems:     Constitutional: Afebrile, clinically feeling better than yesterday however not feeling well overall as well given homesickness.  HENT: Negative.  Eyes: Negative for visual changes.  Respiratory: Negative for shortness of breath.  Cardiovascular: Negative.  Gastrointestinal: Negative for nausea, vomiting, abdominal pain, diarrhea, constipation.  Genitourinary: Negative.  Musculoskeletal: Left arm swelling and pain improved as above.  Skin: Negative for rash or skin infection.  Neurological: Negative for numbness, tingling, sensory changes, weakness or headaches.    Endo/Heme/Allergies: No bruising/bleeding easily.    Psychiatric/Behavioral: Slightly decreased mood.     OBJECTIVE:     Max Temp: Temp (24hrs), Av.7 °C (98 °F), Min:36.4 °C (97.5 °F), Max:36.9 °C (98.5 °F)      Vitals: BP  "117/67   Pulse 92   Temp 36.5 °C (97.7 °F) (Temporal)   Resp 18   Ht 1.651 m (5' 5\")   Wt 66 kg (145 lb 8.1 oz)   SpO2 96%   BMI 24.21 kg/m²     I/O:   Intake/Output Summary (Last 24 hours) at 2/22/2023 2220  Last data filed at 2/22/2023 1920  Gross per 24 hour   Intake 2013.4 ml   Output 1060 ml   Net 953.4 ml       Labs:    Most Recent Hematology Labs:    Lab Results   Component Value Date/Time    WBC 4.4 (L) 02/20/2023 0615    HEMOGLOBIN 7.5 (L) 02/20/2023 0615    MCV 88.4 02/20/2023 0615    PLATELETCT 75 (L) 02/20/2023 0615    NEUTS 3.91 02/20/2023 0615       Most Recent Metabolic Panel:    Lab Results   Component Value Date/Time    POTASSIUM 3.7 02/18/2023 0340    CHLORIDE 105 02/18/2023 0340    CO2 24 02/18/2023 0340    GLUCOSE 110 (H) 02/18/2023 0340    BUN 6 (L) 02/18/2023 0340    CREATININE 0.26 (L) 02/18/2023 0340    CALCIUM 7.1 (L) 02/18/2023 0340    ANION 8.0 02/18/2023 0340     Physical Exam:    Constitutional: Much improved, well appearing.   HENT: Normocephalic and atraumatic. Alopecia.  No rhinorrhea. Oropharynx is clear and moist.   Eyes: Conjunctivae are normal. EOMI. nonicteric.  Neck: Normal range of motion of neck, no adenopathy.    Cardiovascular: Normal rate, regular rhythm.  No murmur. DP/radial pulses 2+, cap refill < 2 sec  Pulmonary/Chest: Effort normall. No respiratory distress. Symmetric expansion.  No crackles or wheezes.  Abdomen: Soft. Bowel sounds are normal. No distension and no mass. There is no hepatosplenomegaly.    Genitourinary:  Deferred.  Musculoskeletal: Normal range of motion of lower and upper extremities bilaterally.  Left arm continues to improve clinically with further reduction in swelling.  Neurovascularly intact.     Neurological: Alert and oriented to person and place. Exhibits normal muscle tone bilaterally in upper and lower extremities. Gait not assessed.  Skin: Skin is warm, dry and pink.  No rash or evidence of skin infection.   Psychiatric: Mood " slightly down today.      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 and whose End of Induction IB MRD was both negative by flow cytometry and PCR who was admitted at Day 50 of Delayed Intensification with fever and neutropenia found to have a left shoulder joint and soft tissue infection with Bacteriodes fragilis     1) Soft Tissue Infection / Joint Infection with Bacteroides fragilis:  - Edema and pain continue to improve  - X-ray L shoulder 2/8/2023: not concerning  - MRI shoulder 2/10/2023: showing diffuse myositis and some intermuscular edema/fluid  - No evidence of DVT  - Was initially on cefepime for F&N, added IV Vancomycin on 2/10/2023, discontinued both cefepime and vancomycin on 2/20/2023 after start of IV Flagyl (below)  - S/P open exploration/drainage 2/17/2023  - Culture: B.fragilis (both deep and superficial cultures)   - ID Consultation with Dr. Singh - recommendation for swithc to metronidazole for 4+ weeks  - Flagyl 500 mg IV Q8 hours (Day 4)  -IgG adequate at 525     2) Left Shoulder Pain:  - Improved pain overnight  - Decreased boluses from PCA  - Current PCA settings: Dilaudid basal rate 0.5 mg/hr, bolus dose to 0.4 mg  - As pain is just controlled, will not make any changes today     3) Febrile Neutropenia in Immunocompromised Host: (RESOLVED)  - Previous history of gram negative septic shock  - Blood culture from port 2/7/2023: NGTD  - Repeat blood culture from port 2/9/2023: NGTD  - IV Cefepime every 8 hrs (Discontinued 2/20/2023)  - IV Vancomycin started on 2/10/2023 (Discontinued 2/20/2023)     4) Tachycardia:   - Some PVCs noted on tele but hemodynamically stable  - Recently admitted with severe septic shock, hence consider cardiomyopathy  - Consulted cardiology (Dr Galvez): Holter monitor and repeat ECHO essentially unremarkable  - No additional interventions  - As  "orthostasis and deconditioning are consideration  - Working with PT/OT    5) At Risk of Opioid Induced Constipation:   - senna-docusate BID  - Encouraged ambulation as tolerated.  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, in MRD Remission:     [id number], AR Arm B, Delayed Intensification, Day 65: THERAPY COMPLETE   ** \"Interim Maintenance\" ON HOLD pending improvement (will likely be delayed by 12-14 days)  ** Continue imatinib 600 mg PO daily                     9) Chemotherapy Related Pancytopenia:  - WBC 4.4, Hgb 7.5, platelets 75 on 2/20/2023  - ANC 3910, ALC 30 on 2/20/2023  - No transfusion indicated  - Transfused pRBCs last on 2/16/2023  - Transfuse for hemoglobin less than 7.5 or symptomatic  - Transfuse for platelets less than 10,000 or symptomatic  - CBC every Monday and Thursday     10) At Risk for DVT Secondary to PEG Asparaginase: (LESSENING)  - Platelets >50k  - Currently on apixaban 2.5 mg PO BID  - Now 24 days out from PEG Asparaginase - low risk for therapy related clot however still with low protein and globulin  - Also at risk given acute illness and lack of ambulation              - Will discontinue when ambulating regularly                11) At Risk of Opportunistic Lung Infection:  - Bactrim DS PO BID Sat and Kalpana for PJP prophylaxis     12) Central Access:   - R Port-A-Cath in place      Research Participant:           [employer] [facility] [facility] - Source Data         Diagnosis: Ph+ Precursor B-Cell Acute Lymphoblastic Leukemia     Disease Status: EOI1A MRD NEGATIVE, EOI1B RD NEGATIVE, CNS3c, testicular " negative, HSV1 IgG POSITIVE, CMV IgG NEGATIVE, VARICELLA IgG POSITIVE     Active Studies: [id number], [id number]                                                                                                      Inactive Studies: [id number]                                                                                                                                                Optional Studies: None             Protocol: International Phase 3 trial in [place] 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      Treatment Plan: [id number](OS), AR-Arm B, Delayed Intensification, Day 65     Height: 1.650 m      Weight: 64.2kg       BSA: 1.72 m²   (Delayed Intensification Day 29 1/17/2023)                                                                                                                                           Performance Status: Karnofsky 50, ECOG  3 (2/21/2023)     Treatment Plan Medications:  (100% adherent with imatinib)     Imatinib dose adjusted for weight at DI, Day 29 to Imatinib 600 mg PO daily in AM     Evaluations / Study Labs:  (2/22/2023)      None required     Therapy Given: (2/22/2023)     Imatinib 600 mg PO QAM     Toxicities:     **Grade 3 febrile neutropenia on 2/7/2023 (ANC <1000/mm3 with a single temperature of >38.3 degrees C (101 degrees F) or a sustained temperature of >=38 degrees C (100.4 degrees F) for more than one hour)  ** Grade 3 diarrhea on 2/6/2023 (Increase of >=7 stools per day  over baseline; hospitalization indicated, limiting self care ADL): Resolved 2/7/2023  ** Grade 2 diarrhea on 2/7/2023 (Increase of 4 - 6 stools per day  over baseline; limiting instrumental ADL) Resolved 2/8/2023  ** Grade 3 vomiting on 2/6/2023 and 2/7/2023 (hospitalization indicated) Resolved 2/8/2023  ** Grade 2 Myositis (Moderate pain associated with weakness; pain limiting, instrumental ADL):  Present on admission on 2/7/2023  ** Grade 3 Hypoxemia (Decreased oxygen saturation at rest (e.g., pulse oximeter  <88% or PaO2 <=55 mm Hg), started 2/10/2023  ** Grade 3 soft tissue infection (invasive intervention indicated)         Disposition: Inpatient for treatment of left should infection and pain     Pepe Faye MD  Pediatric Hematology / Oncology  [facility].  [phone]  [facility]. [phone]

## 2023-02-23 NOTE — PROGRESS NOTES
Emotional support provided to pt. Pt resting.  He did ask for a Sketch Book, pencils and pen, which I provided. Will continue to provide support and follow through hospitalization.

## 2023-02-23 NOTE — CARE PLAN
The patient is Watcher - Medium risk of patient condition declining or worsening    Shift Goals  Clinical Goals: pain control, stable vital signs  Patient Goals: rest  Family Goals: N/A    Progress made toward(s) clinical / shift goals:  Patients pain has been at a comfortable level with and vitals has been stable.        Problem: Pain - Standard  Goal: Alleviation of pain or a reduction in pain to the patient’s comfort goal  Outcome: Not Progressing  Patients pain has been at a tolerable level with the PCA pump and scheduled Dilaudid.      Problem: Knowledge Deficit - Standard  Goal: Patient and family/care givers will demonstrate understanding of plan of care, disease process/condition, diagnostic tests and medications  Outcome: Progressing  Patient updated on plan of care and verbalized understanding.    Pt demonstrates ability to turn self in bed without assistance of staff. Patient and understands importance in prevention of skin breakdown, ulcers, and potential infection. Hourly rounding in effect. RN skin check complete.   Devices in place include: chest port, Pulse ox.  Skin assessed under devices: Yes.  Confirmed HAPI identified on the following date: NA   Location of HAPI: NA.  Wound Care RN following: No.  The following interventions are in place: Patients skin assessed every 4 hours. Devices rotated as needed.

## 2023-02-23 NOTE — PROGRESS NOTES
Pediatric Hematology/Oncology  Daily Progress Note      Patient Name:  Tomas Jean-Baptiste  : 2000  MRN: [id number]    Location of Service:  [facility]  Date of Service: 2023  Time: 11:03 AM    Hospital Day: 17    Protocol / Treatment Plan:  [place] Chromosome Precursor B-Cell Acute Lymphoblastic Leukemia, ON STUDY [id number], Delayed Intensification, Day 66    SUBJECTIVE:     No acute events overnight.  Afebrile with Tmax 99.0 °F.  Today, Tomas Roy reports that overall he continues to improve.  He does continue to report some significant fatigue however his pain is improved as well as his oxygen requirement which has been weaned down to 1/2 L.  He denies any shortness of breath or difficulty with breathing.  No complaints of any headaches, changes in vision or neurologic status changes.  JULY does not report any nausea, vomiting, diarrhea or constipation.  No complaints of any abdominal pain.  No complaints of any skin changes or rashes.  Swelling of left arm continues to improve as well as pain.  With this, increased range of motion has been noted.  No changes made yesterday to PCA.  Overnight JULY utilized his PCA approximately 16 times without any attempts without delivery.  He reports that his analgesia was adequate.  No other concerns or complaints this morning.    Review of Systems:     Constitutional: Afebrile, feeling much better than yesterday.  Still tired but reports that this is likely groggy due to just waking up.  Improved appetite.  HENT: Negative for auditory changes or ear pain, no nosebleeds, mild congestion and rhinorrhea, no sore throat.  No mouth sores.  Eyes: Negative for visual changes.  Respiratory: Negative for shortness of breath.  Cardiovascular: Negative.  Gastrointestinal: Negative for nausea, vomiting, abdominal pain, diarrhea, constipation.  Genitourinary: Negative.  Musculoskeletal: Significantly improved arm pain.  Improved  "range of motion.  Skin: Negative.  Neurological: Negative for numbness, tingling, sensory changes, weakness or headaches.    Endo/Heme/Allergies: No bruising/bleeding easily.    Psychiatric/Behavioral: Improved mood.    OBJECTIVE:     Max Temp: Temp (24hrs), Av.7 °C (98.1 °F), Min:36.5 °C (97.7 °F), Max:37.2 °C (99 °F)    Vitals: /68   Pulse 74   Temp 37.2 °C (99 °F) (Temporal)   Resp 16   Ht 1.651 m (5' 5\")   Wt (P) 67.1 kg (147 lb 14.9 oz)   SpO2 97%   BMI (P) 24.62 kg/m²     I/O:   Intake/Output Summary (Last 24 hours) at 2023 1103  Last data filed at 2023 0700  Gross per 24 hour   Intake 2743.66 ml   Output 1210 ml   Net 1533.66 ml       Labs:    Most Recent Hematology Labs:    Lab Results   Component Value Date/Time    WBC 4.4 (L) 2023 0615    HEMOGLOBIN 7.5 (L) 2023 0615    MCV 88.4 2023 0615    PLATELETCT 75 (L) 2023 0615    NEUTS 3.91 2023 0615       Most Recent Metabolic Panel:    Lab Results   Component Value Date/Time    POTASSIUM 3.7 2023 0340    CHLORIDE 105 2023 0340    CO2 24 2023 0340    GLUCOSE 110 (H) 2023 0340    BUN 6 (L) 2023 0340    CREATININE 0.26 (L) 2023 0340    CALCIUM 7.1 (L) 2023 0340    ANION 8.0 2023 0340     Physical Exam:    Constitutional: Much improved, well appearing.  Thin and pale.    HENT: Normocephalic and atraumatic. Alopecia.  No rhinorrhea. Oropharynx is clear and moist.   Eyes: Conjunctivae are normal. EOMI.   Neck: Normal range of motion of neck, no adenopathy.    Cardiovascular: Normal rate, regular rhythm.  2/6 systolic murmur. DP/radial pulses 2+, cap refill < 2 sec  Pulmonary/Chest: Effort normall. No respiratory distress. Symmetric expansion.  No crackles or wheezes.  Abdomen: Soft. Bowel sounds are normal. No distension and no mass. There is no hepatosplenomegaly.    Genitourinary:  Deferred  Musculoskeletal: Normal range of motion of lower and upper extremities " bilaterally. No tenderness to palpation of elbows, writs, hands, knees, ankles and feet bilaterally.   Lymphadenopathy: No cervical adenopathy, axillary adenopathy or inguinal adenopathy.   Neurological: Alert and oriented to person and place. Exhibits normal muscle tone bilaterally in upper and lower extremities. Gait normal. Coordination normal.    Skin: Skin is warm, dry and pink.  No rash or evidence of skin infection.   Psychiatric: Mood improved greatly from yesterday.    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 and whose End of Induction IB MRD was both negative by flow cytometry and PCR who was admitted at Day 50 of Delayed Intensification with fever and neutropenia found to have a left shoulder joint and soft tissue infection with Bacteriodes fragilis     1) Soft Tissue Infection / Joint Infection with Bacteroides fragilis:  - Edema and pain continue to improve  - X-ray L shoulder 2/8/2023: not concerning  - MRI shoulder 2/10/2023: showing diffuse myositis and some intermuscular edema/fluid  - No evidence of DVT  - Was initially on cefepime for F&N, added IV Vancomycin on 2/10/2023, discontinued both cefepime and vancomycin on 2/20/2023 after start of IV Flagyl (below)  - S/P open exploration/drainage 2/17/2023  - Culture: B.fragilis (both deep and superficial cultures)   - ID Consultation with Dr. Singh - recommendation for swithc to metronidazole for 4+ weeks  - Flagyl 500 mg IV Q8 hours (Day 5)    ** Spoke again with Dr. Singh - will attempt total of 6 weeks of Flagyl - if neuropathies complicate care can give 4+ weeks of thewrapy   - IgG adequate at 525     2) Left Shoulder Pain:  - Improved pain overnight  - Decreased boluses from PCA (16 total)  - Current PCA settings: Dilaudid basal rate 0.5 mg/hr, bolus dose to 0.4 mg  - Will decrease opioid support with change in basal to 0.4  mg/hr     3) Febrile Neutropenia in Immunocompromised Host: (RESOLVED)  - Previous history of gram negative septic shock  - Blood culture from port 2/7/2023: NGTD  - Repeat blood culture from port 2/9/2023: NGTD  - IV Cefepime every 8 hrs (Discontinued 2/20/2023)  - IV Vancomycin started on 2/10/2023 (Discontinued 2/20/2023)     4) Tachycardia:   - Some PVCs noted on tele but hemodynamically stable  - Recently admitted with severe septic shock, hence consider cardiomyopathy  - Consulted cardiology (Dr Galvez): Holter monitor and repeat ECHO essentially unremarkable  - No additional interventions  - As orthostasis and deconditioning are consideration  - Working with PT/OT     5) At Risk of Opioid Induced Constipation:   - senna-docusate BID  - Encouraged ambulation as tolerated.  - Stooling regularly     6) Retrosternal Chest Pain:  - Likely GERD  - Continue famotidine 20 mg BID  - Continue to monitor     7) Hypoxemia (IMPROVED):   - Able to wean down to 1/2 L nasal cannula oxygen today following blood transfusion  - Wean as tolerated              - Continue Incentive Spirometry                       - Ambulation as tolerated              - Up and to chair as tolerated     8) Ph+ Precursor B-Cell Acute Lymphoblastic Leukemia, in MRD Remission:     [id number], AR Arm B, Delayed Intensification, Day 65: THERAPY COMPLETE   ** Interim Maintenance ON HOLD pending improvement (will likely be delayed by 12-14 days)   ** Plan to start Interim Maintenance 2/26 or 2/27 depending on clinical improvement    ** Continue imatinib 600 mg PO daily                     9) Chemotherapy Related Pancytopenia:  - WBC 4.0, Hgb 9.4, platelets 120 following PRBC transfusion  - ANC 3270,   - No transfusion indicated  - Transfused pRBCs last on 2/16/2023 and again 2/22/2023  - Transfuse for hemoglobin less than 7.5 or symptomatic  - Transfuse for platelets less than 10,000 or symptomatic  - CBC every Monday and Thursday     10) At Risk  for DVT Secondary to PEG Asparaginase: (LESSENING)  - Platelets >50k  - Currently on apixaban 2.5 mg PO BID  - Now 24 days out from PEG Asparaginase - low risk for therapy related clot however still with low protein and globulin  - Also at risk given acute illness and lack of ambulation              - Will hold apixaban tomorrow and on Saturday in anticipation of possible Interim Maintenance II Day 1 IT methotrexate                11) At Risk of Opportunistic Lung Infection:  - Bactrim DS PO BID Sat and Sun for PJP prophylaxis     12) Central Access:   - R Port-A-Cath in place      Research Participant:           [facility] - Source Data         Diagnosis: Ph+ Precursor B-Cell Acute Lymphoblastic Leukemia     Disease Status: EOI1A MRD NEGATIVE, EOI1B RD NEGATIVE, CNS3c, testicular negative, HSV1 IgG POSITIVE, CMV IgG NEGATIVE, VARICELLA IgG POSITIVE     Active Studies: [id number], [id number]                                                                                                      Inactive Studies: [id number]                                                                                                                                                Optional Studies: None             Protocol: International Phase 3 trial in [place] 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      Treatment Plan: [id number](OS), AR-Arm B, Delayed Intensification, Day 66     Height: 1.650 m      Weight: 64.2kg       BSA: 1.72 m²   (Delayed Intensification Day 29 1/17/2023)                                                                                                                                           Performance Status: Karnofsky 50, ECOG  3 (2/21/2023)     Treatment Plan Medications:  (100% adherent with imatinib)     Imatinib dose adjusted for weight at DI, Day 29 to Imatinib 600 mg PO daily in AM      Evaluations / Study Labs:  (2/23/2023)      None required     Therapy Given: (2/23/2023)     Imatinib 600 mg PO QAM     Toxicities:     **Grade 3 febrile neutropenia on 2/7/2023 (ANC <1000/mm3 with a single temperature of >38.3 degrees C (101 degrees F) or a sustained temperature of >=38 degrees C (100.4 degrees F) for more than one hour)  ** Grade 3 diarrhea on 2/6/2023 (Increase of >=7 stools per day  over baseline; hospitalization indicated, limiting self care ADL): Resolved 2/7/2023  ** Grade 2 diarrhea on 2/7/2023 (Increase of 4 - 6 stools per day  over baseline; limiting instrumental ADL) Resolved 2/8/2023  ** Grade 3 vomiting on 2/6/2023 and 2/7/2023 (hospitalization indicated) Resolved 2/8/2023  ** Grade 2 Myositis (Moderate pain associated with weakness; pain limiting, instrumental ADL): Present on admission on 2/7/2023  ** Grade 3 Hypoxemia (Decreased oxygen saturation at rest (e.g., pulse oximeter  <88% or PaO2 <=55 mm Hg), started 2/10/2023  ** Grade 3 soft tissue infection (invasive intervention indicated)         Disposition: Inpatient for treatment of left should infection and pain      Pepe Faye MD  Pediatric Hematology / Oncology  [facility]  Cell.  [phone]  Office. [phone]

## 2023-02-23 NOTE — CARE PLAN
The patient is Watcher - Medium risk of patient condition declining or worsening    Shift Goals  Clinical Goals: (P) pain control, decrease o2 requirement  Patient Goals: (P) rest  Family Goals: n/a    Progress made toward(s) clinical / shift goals:  Patients pain level has stayed at a comfortable level. Patient is requiring 0.5 L of oxygen.      Problem: Knowledge Deficit - Standard  Goal: Patient and family/care givers will demonstrate understanding of plan of care, disease process/condition, diagnostic tests and medications  Outcome: Progressing  Patient updated on plan of care and verbalized understanding.     Problem: Bowel Elimination  Goal: Establish and maintain regular bowel function  Outcome: Progressing  Patient is having regular bowel movements.    Pt demonstrates ability to turn self in bed without assistance of staff. Patient and family understands importance in prevention of skin breakdown, ulcers, and potential infection. Hourly rounding in effect. RN skin check complete.   Devices in place include: Chest port, pulse ox, nasal cannula.  Skin assessed under devices: Yes.  Confirmed HAPI identified on the following date: NA   Location of HAPI: NA.  Wound Care RN following: No.  The following interventions are in place: Skin assessed q4. Devices rotated as needed.

## 2023-02-24 PROCEDURE — A9270 NON-COVERED ITEM OR SERVICE: HCPCS | Performed by: PEDIATRICS

## 2023-02-24 PROCEDURE — 99232 SBSQ HOSP IP/OBS MODERATE 35: CPT | Performed by: PEDIATRICS

## 2023-02-24 PROCEDURE — 700102 HCHG RX REV CODE 250 W/ 637 OVERRIDE(OP): Performed by: PEDIATRICS

## 2023-02-24 PROCEDURE — 700111 HCHG RX REV CODE 636 W/ 250 OVERRIDE (IP): Performed by: PEDIATRICS

## 2023-02-24 PROCEDURE — 97535 SELF CARE MNGMENT TRAINING: CPT

## 2023-02-24 PROCEDURE — 700105 HCHG RX REV CODE 258: Performed by: PEDIATRICS

## 2023-02-24 PROCEDURE — 700101 HCHG RX REV CODE 250: Performed by: PEDIATRICS

## 2023-02-24 PROCEDURE — 97110 THERAPEUTIC EXERCISES: CPT

## 2023-02-24 PROCEDURE — 770003 HCHG ROOM/CARE - PEDIATRIC PRIVATE*

## 2023-02-24 RX ORDER — LORAZEPAM 2 MG/ML
1 CONCENTRATE ORAL EVERY 6 HOURS PRN
Status: CANCELLED | OUTPATIENT
Start: 2023-02-28

## 2023-02-24 RX ORDER — LIDOCAINE AND PRILOCAINE 25; 25 MG/G; MG/G
CREAM TOPICAL PRN
Status: CANCELLED | OUTPATIENT
Start: 2023-02-28

## 2023-02-24 RX ORDER — SODIUM CHLORIDE 9 MG/ML
500 INJECTION, SOLUTION INTRAVENOUS CONTINUOUS
Status: CANCELLED | OUTPATIENT
Start: 2023-02-28

## 2023-02-24 RX ORDER — MIDAZOLAM HYDROCHLORIDE 1 MG/ML
2 INJECTION INTRAMUSCULAR; INTRAVENOUS ONCE
Status: CANCELLED
Start: 2023-02-24 | End: 2023-02-24

## 2023-02-24 RX ORDER — EPINEPHRINE 1 MG/ML(1)
0.5 AMPUL (ML) INJECTION PRN
Status: CANCELLED | OUTPATIENT
Start: 2023-02-28

## 2023-02-24 RX ORDER — ONDANSETRON 2 MG/ML
8 INJECTION INTRAMUSCULAR; INTRAVENOUS EVERY 8 HOURS PRN
Status: CANCELLED | OUTPATIENT
Start: 2023-02-24

## 2023-02-24 RX ORDER — LORAZEPAM 2 MG/ML
1 CONCENTRATE ORAL EVERY 6 HOURS PRN
Status: CANCELLED | OUTPATIENT
Start: 2023-02-24

## 2023-02-24 RX ORDER — SODIUM CHLORIDE 9 MG/ML
500 INJECTION, SOLUTION INTRAVENOUS CONTINUOUS
Status: CANCELLED | OUTPATIENT
Start: 2023-02-24

## 2023-02-24 RX ORDER — PROMETHAZINE HYDROCHLORIDE 6.25 MG/5ML
0.25 SYRUP ORAL EVERY 6 HOURS PRN
Status: CANCELLED | OUTPATIENT
Start: 2023-02-28

## 2023-02-24 RX ORDER — DIPHENHYDRAMINE HYDROCHLORIDE 50 MG/ML
50 INJECTION INTRAMUSCULAR; INTRAVENOUS PRN
Status: CANCELLED | OUTPATIENT
Start: 2023-02-28

## 2023-02-24 RX ORDER — LIDOCAINE AND PRILOCAINE 25; 25 MG/G; MG/G
CREAM TOPICAL PRN
Status: CANCELLED | OUTPATIENT
Start: 2023-02-24

## 2023-02-24 RX ORDER — ONDANSETRON 2 MG/ML
8 INJECTION INTRAMUSCULAR; INTRAVENOUS EVERY 8 HOURS PRN
Status: CANCELLED | OUTPATIENT
Start: 2023-02-28

## 2023-02-24 RX ORDER — DIPHENHYDRAMINE HYDROCHLORIDE 50 MG/ML
50 INJECTION INTRAMUSCULAR; INTRAVENOUS ONCE
Status: CANCELLED | OUTPATIENT
Start: 2023-02-28 | End: 2023-02-24

## 2023-02-24 RX ORDER — ONDANSETRON 2 MG/ML
8 INJECTION INTRAMUSCULAR; INTRAVENOUS ONCE
Status: CANCELLED | OUTPATIENT
Start: 2023-02-24

## 2023-02-24 RX ORDER — PROMETHAZINE HYDROCHLORIDE 6.25 MG/5ML
0.25 SYRUP ORAL EVERY 6 HOURS PRN
Status: CANCELLED | OUTPATIENT
Start: 2023-02-24

## 2023-02-24 RX ORDER — SULFAMETHOXAZOLE AND TRIMETHOPRIM 800; 160 MG/1; MG/1
2 TABLET ORAL
Status: DISCONTINUED | OUTPATIENT
Start: 2023-02-25 | End: 2023-02-25

## 2023-02-24 RX ADMIN — Medication: at 12:47

## 2023-02-24 RX ADMIN — METRONIDAZOLE 500 MG: 5 INJECTION, SOLUTION INTRAVENOUS at 14:02

## 2023-02-24 RX ADMIN — APIXABAN 2.5 MG: 2.5 TABLET, FILM COATED ORAL at 18:33

## 2023-02-24 RX ADMIN — KETOROLAC TROMETHAMINE 15 MG: 30 INJECTION, SOLUTION INTRAMUSCULAR; INTRAVENOUS at 00:00

## 2023-02-24 RX ADMIN — METRONIDAZOLE 500 MG: 5 INJECTION, SOLUTION INTRAVENOUS at 06:00

## 2023-02-24 RX ADMIN — Medication 1000 UNITS: at 06:21

## 2023-02-24 RX ADMIN — FAMOTIDINE 20 MG: 20 TABLET ORAL at 06:20

## 2023-02-24 RX ADMIN — FAMOTIDINE 20 MG: 20 TABLET ORAL at 18:33

## 2023-02-24 RX ADMIN — ONDANSETRON 8 MG: 4 TABLET, ORALLY DISINTEGRATING ORAL at 12:44

## 2023-02-24 RX ADMIN — IMATINIB MESYLATE 400 MG: 400 TABLET, FILM COATED ORAL at 06:25

## 2023-02-24 RX ADMIN — IMATINIB MESYLATE 200 MG: 100 TABLET, FILM COATED ORAL at 06:00

## 2023-02-24 RX ADMIN — CHLORHEXIDINE GLUCONATE 15 ML: 1.2 RINSE ORAL at 06:00

## 2023-02-24 RX ADMIN — APIXABAN 2.5 MG: 2.5 TABLET, FILM COATED ORAL at 06:21

## 2023-02-24 RX ADMIN — KETOROLAC TROMETHAMINE 15 MG: 30 INJECTION, SOLUTION INTRAMUSCULAR; INTRAVENOUS at 06:22

## 2023-02-24 RX ADMIN — METRONIDAZOLE 500 MG: 5 INJECTION, SOLUTION INTRAVENOUS at 22:59

## 2023-02-24 ASSESSMENT — PAIN DESCRIPTION - PAIN TYPE
TYPE: ACUTE PAIN

## 2023-02-24 NOTE — PROGRESS NOTES
Pt demonstrates ability to turn self in bed without assistance of staff. Patient  understands importance in prevention of skin breakdown, ulcers, and potential infection. Hourly rounding in effect. RN skin check complete.   Devices in place include: NC, , R chest port  Skin assessed under devices: Yes.  Confirmed HAPI identified on the following date: N/A   Location of HAPI: N/A  Wound Care RN following: No.  The following interventions are in place: Frequent patient and device assessment and repositioning, silicone oxygen tubing in use, pillows in use for support/positioning.

## 2023-02-24 NOTE — CARE PLAN
The patient is Watcher - Medium risk of patient condition declining or worsening    Shift Goals  Clinical Goals: pain control, titrate O2 requirement, VSS  Patient Goals: rest, pain control  Family Goals: no contact    Progress made toward(s) clinical / shift goals:    Problem: Self Care  Goal: Patient will have the ability to perform ADLs independently or with assistance (bathe, groom, dress, toilet and feed)  Outcome: Progressing   Patient was able to toilet and feed himself independently and appropriately throughout this shift.   Problem: Pain - Standard  Goal: Alleviation of pain or a reduction in pain to the patient’s comfort goal  Outcome: Progressing   Patient's pain was well-managed with the use of the PCA pump and IV Toradol.     Patient is not progressing towards the following goals: N/A

## 2023-02-24 NOTE — PROGRESS NOTES
Patient is a 22 year old with leukemia undergoing chemotherapy who was admitted for neutropenic fever. He developed left shoulder pain with swelling. CT was performed which showed fluid within the shoulder joint as well as areas of pyomyositis. Patient was taken to the OR on 2/17/2023 for I&D of this. He is day #6 post op. He was sleeping when visited this morning. He is on PCA dilaudid for pain. He is still working on not using his button as much and is still having pain/swelling.     Final culture results show Bacteroides fragilis growth.     Vital signs stable, afebrile     Physical Exam:   Left upper extremity:  Left wrist, fingers, elbow, & forearm full ROM without tenderness to palpation  Swelling of left arm from shoulder to hand unchanged  Left shoulder with painful ROM  Tenderness to palpation anteriorly  Sensation intact to light touch throughout arm  Skin warm to the touch      Dressing clean, dry, and intact without drainage noted     Assessment: Status post Irrigation and debridement left shoulder infection done on 2/17/2023     Plan:  Continue current antibiotic regimen  May use his left shoulder, but no heavy lifting  Encourage ROM of shoulder and upper extremity with elevation for swelling control  We will follow along while he is an inpatient  He will need a wound check in 2-3 weeks after surgery if he is discharged.

## 2023-02-24 NOTE — THERAPY
Physical Therapy   Daily Treatment     Patient Name: Tomas Jean-Baptiste  Age:  22 y.o., Sex:  male  Medical Record #: [id number]  Today's Date: 2/24/2023          Assessment    Pt seen today for PT treatment session with emphasis on home exercises program to encourage improving ROM And strength of L UE. Pt found in semi upright position in bed. L UE has significantly decreased compared to Wednesday. Pt reports that despite pain, he is trying to work on some of the exercises that were discussed Wednesday. He is noticing a decrease in swelling and improved AROM and PROM. Pt provided with yellow theraband, yellow foam block and printed HEP for use at home upon DC. Reviewed exercises discussed Wednesday including scapular squeezed, shoulder rolls and shoulder shrugs. Added non-resistive bicep curls, isometric shoulder ER, IR, extension, flexion and abduction and AAROM of shoulder into flexion and ER with cane. Passively, flexion and abduction of shoulder remains at 90 but with decreased pain and discomfort getting there. He did have increased pain with isometric shoulder activities but was able to push through to complete. Advised pt to focus on quality of quantity when performing exercises. Pt thankful for the assistance and exercises. Pt may benefit from follow up with therapy with home health to continue to progress L UE ROM and general strengthening.     Plan    Physical Therapy Treatment Plan: (P) Continue Current Treatment Plan               02/24/23 0958   Pain 0 - 10 Group   Location Arm   Location Orientation Left   Therapist Pain Assessment During Activity  (Did not rate on pain scale)   Non Verbal Descriptors   Non Verbal Scale  Calm   Cognition    Cognition / Consciousness WDL   Level of Consciousness Alert   Comments Remains cooperative and motivated to improve   Passive ROM Lower Body   Passive ROM Lower Body WDL   Active ROM Lower Body    Active ROM Lower Body  WDL   Short Term Goals    Short Term  Goal # 1 Pt will ambulate 150 feet with no AD with no overt LOB or gait deviations with HR in the low 100's prior to DC to improve activity tolerance at home   Goal Outcome # 1 Other (see comments)  (not addressed today, session focus on ther ex)   Short Term Goal # 2 Pt will demonstrate improvements in AROM And PROM of L UE prior to DC to improve functional use of L UE   Goal Outcome # 2 Progressing as expected   Short Term Goal # 3 Pt will be independent with HEP prior to DC to optimize recovery upon DC   Goal Outcome # 3 Progressing as expected   Education Group   Exercises - Supine Patient Response Patient;Acceptance;Explanation;Handout;Verbal Demonstration   Physical Therapy Treatment Plan   Physical Therapy Treatment Plan Continue Current Treatment Plan

## 2023-02-24 NOTE — PROGRESS NOTES
Pediatric Hematology/Oncology  Daily Progress Note      Patient Name:  Tomas Jean-Baptiste  : 2000  MRN: [id number]    Location of Service:  [facility] - [facility]  Date of Service: 2023  Time: 2:01 PM    Hospital Day: 18    Protocol / Treatment Plan: [place] Chromosome Precursor B-Cell Acute Lymphoblastic Leukemia, ON STUDY [id number], Delayed Intensification, Day 67    SUBJECTIVE:     Overnight, afebrile Tmax 98.0 °F.  This morning JULY reports that there was some increased swelling in his arm overnight.  Nursing reported an increase of left bicep girth to 33 cm from 32 cm and this morning back down to 32.5 cm.  He reports that he feels that there might have been some increased pain as well however he admits that he was pretty sleepy all throughout the night.  And when he is notified that he only pressed his PCA 3 times he acknowledges that maybe pain was not as bad as he thought it might have been.  No complaints of any headaches, changes in vision or neurologic status changes.  This morning he has been weaned to room air and is tolerating the wean well without any desaturations, shortness of breath or difficulty with breathing.  No cough.  No complaints of any nausea, vomiting, diarrhea or constipation.  He does report however that he is unable to eat solid foods at this time on account of solid foods causing him to have some nausea.  He reports that he is able to drink shakes and liquids without any issues.  JULY has not had any rashes or skin changes.  No complaints of any other swelling.  No issues with constipation or diarrhea.  No complaints of any issues with voiding.  No other concerns or complaints at this time.    Review of Systems:     Constitutional: Afebrile, continues to feel better with every day.  More energy today.  This morning quite awake and alert.  As above, notes significant decrease in appetite and still taking liquids orally however solids are causing  "nausea.  HENT: Negative for auditory changes or ear pain, no nosebleeds, mild congestion and rhinorrhea, no sore throat.  No mouth sores.  Eyes: Negative for visual changes.  Respiratory: Negative for shortness of breath.  Cardiovascular: Negative.  Gastrointestinal: Negative for nausea, vomiting, abdominal pain, diarrhea, constipation.  Genitourinary: Negative.  Musculoskeletal: Arm as above with increased girth, swelling back down this morning.  Pain overall decreased.  Skin: Negative for rash or skin infection.  Neurological: Negative for numbness, tingling, sensory changes, weakness or headaches.    Endo/Heme/Allergies: No bruising/bleeding easily.    Psychiatric/Behavioral: Good mood.     OBJECTIVE:     Max Temp: Temp (24hrs), Av.5 °C (97.7 °F), Min:36.1 °C (97 °F), Max:36.7 °C (98 °F)    Vitals: /71   Pulse 72   Temp 36.6 °C (97.8 °F) (Temporal)   Resp 16   Ht 1.651 m (5' 5\")   Wt 67.1 kg (147 lb 14.9 oz)   SpO2 89% Comment: sleeping  BMI 24.62 kg/m²     I/O:   Intake/Output Summary (Last 24 hours) at 2023 1401  Last data filed at 2023 1300  Gross per 24 hour   Intake 2997.19 ml   Output 625 ml   Net 2372.19 ml       Labs:    Most Recent Hematology Labs:    Lab Results   Component Value Date/Time    WBC 4.0 (L) 2023 1200    HEMOGLOBIN 9.4 (L) 2023 1200    MCV 85.5 2023 1200    PLATELETCT 120 (L) 2023 1200    NEUTS 3.27 2023 1200       Most Recent Metabolic Panel:    Lab Results   Component Value Date/Time    POTASSIUM 3.7 2023 0340    CHLORIDE 105 2023 0340    CO2 24 2023 0340    GLUCOSE 110 (H) 2023 034    BUN 6 (L) 2023 034    CREATININE 0.26 (L) 2023 034    CALCIUM 7.1 (L) 2023 034    ANION 8.0 2023 0340     Physical Exam:    Constitutional: Much improved, well appearing.    HENT: Normocephalic and atraumatic. Alopecia.  No rhinorrhea. Oropharynx is clear and moist.   Eyes: Conjunctivae are normal. " EOMI. Nonicteric.  Neck: Normal range of motion of neck, no adenopathy.    Cardiovascular: Normal rate, regular rhythm.  No murmur. DP/radial pulses 2+, cap refill < 2 sec.  Pulmonary/Chest: Effort normal. No respiratory distress. Symmetric expansion.  No crackles or wheezes.  Abdomen: Soft. Bowel sounds are normal. No distension and no mass. There is no hepatosplenomegaly.    Genitourinary:  Deferred.  Musculoskeletal: Improved mobility of left shoulder.  Able to lift and rotate to small degree.  Neurological: Alert and oriented to person and place. Exhibits normal muscle tone bilaterally in upper and lower extremities. Gait not assessed.    Skin: Skin is warm, dry and pink.  No rash or evidence of skin infection.   Psychiatric: Mood improved.    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 and whose End of Induction IB MRD was both negative by flow cytometry and PCR who was admitted at Day 50 of Delayed Intensification with fever and neutropenia found to have a left shoulder joint and soft tissue infection with Bacteriodes fragilis     1) Soft Tissue Infection / Joint Infection with Bacteroides fragilis:  - Edema and pain continue to improve  - X-ray L shoulder 2/8/2023: not concerning  - MRI shoulder 2/10/2023: showing diffuse myositis and some intermuscular edema/fluid  - No evidence of DVT  - Was initially on cefepime for F&N, added IV Vancomycin on 2/10/2023, discontinued both cefepime and vancomycin on 2/20/2023 after start of IV Flagyl (below)  - S/P open exploration/drainage 2/17/2023  - Culture: B.fragilis (both deep and superficial cultures)   - ID Consultation with Dr. Singh - recommendation for swithc to metronidazole for 4+ weeks  - Flagyl 500 mg IV Q8 hours (Day 6)     ** Spoke again with Dr. Singh - will attempt total of 6 weeks of Flagyl - if neuropathies complicate care can give  4+ weeks of therapy   - IgG adequate at 525     2) Left Shoulder Pain:  - Improved pain again overnight  - Decreased boluses from PCA (7 total)  - Current PCA settings: Dilaudid basal rate 0.4 mg/hr, bolus dose to 0.4 mg  - Will decrease opioid support with change in basal to 0.3 mg/hr     3) Febrile Neutropenia in Immunocompromised Host: (RESOLVED)  - Previous history of gram negative septic shock  - Blood culture from port 2/7/2023: NGTD  - Repeat blood culture from port 2/9/2023: NGTD  - IV Cefepime every 8 hrs (Discontinued 2/20/2023)  - IV Vancomycin started on 2/10/2023 (Discontinued 2/20/2023)     4) Tachycardia:   - Some PVCs noted on tele but hemodynamically stable  - Recently admitted with severe septic shock, hence consider cardiomyopathy  - Consulted cardiology (Dr Galvez): Holter monitor and repeat ECHO essentially unremarkable  - No additional interventions  - As orthostasis and deconditioning are consideration  - Working with PT/OT     5) At Risk of Opioid Induced Constipation:   - Senna-docusate BID  - Encouraged ambulation as tolerated.  - Stooling regularly     6) Retrosternal Chest Pain (RESOLVED):  - Likely GERD  - Continue famotidine 20 mg BID  - Continue to monitor    7) Poor Appetite / Nausea:  - Reports nausea specifically when eating solids  - Encouraged slowly advancing diet     8) Hypoxemia (IMPROVED):   - Able to wean off nasal cannula oxygen today following blood transfusion              - Continue Incentive Spirometry                       - Ambulation as tolerated              - Up and to chair as tolerated     9) Ph+ Precursor B-Cell Acute Lymphoblastic Leukemia, in MRD Remission:     [id number], AR Arm B, Delayed Intensification, Day 67: THERAPY COMPLETE   ** Interim Maintenance ON HOLD pending improvement (will likely be delayed by 12-14 days)     ** Plan to start Interim Maintenance 2/26 or 2/27 depending on clinical improvement     ** Continue imatinib 600 mg PO daily                      10) Chemotherapy Related Pancytopenia:  - WBC 4.0, Hgb 9.4, platelets 120 following PRBC transfusion yesterday  - ANC 3270,  yesterday  - No transfusion indicated  - Transfused pRBCs last on 2/16/2023 and again 2/22/2023  - Transfuse for hemoglobin less than 7.5 or symptomatic  - Transfuse for platelets less than 10,000 or symptomatic  - CBC every Monday and Thursday     11) At Risk for DVT Secondary to PEG Asparaginase: (LESSENING)  - Platelets >50k  - Currently on apixaban 2.5 mg PO BID  - Now 24 days out from PEG Asparaginase - low risk for therapy related clot however still with low protein and globulin  - Also at risk given acute illness and lack of ambulation              - Will hold apixaban tomorrow and on Saturday in anticipation of possible Interim Maintenance II Day 1 IT methotrexate                12) At Risk of Opportunistic Lung Infection:  - Bactrim DS PO BID Sat and Sun for PJP prophylaxis     13) Central Access:   - R Port-A-Cath in place      Research Participant:           [facility] - Source Data         Diagnosis: Ph+ Precursor B-Cell Acute Lymphoblastic Leukemia     Disease Status: EOI1A MRD NEGATIVE, EOI1B RD NEGATIVE, CNS3c, testicular negative, HSV1 IgG POSITIVE, CMV IgG NEGATIVE, VARICELLA IgG POSITIVE     Active Studies: [id number], [id number]                                                                                                      Inactive Studies: [id number]                                                                                                                                                Optional Studies: None             Protocol: International Phase 3 trial in [place] 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      Treatment Plan: [id number](OS), AR-Arm B, Delayed Intensification, Day 67     Height: 1.650 m      Weight: 64.2kg       BSA:  1.72 m²   (Delayed Intensification Day 29 1/17/2023)                                                                                                                                           Performance Status: Karnofsky 60, ECOG  3 (2/24/2023)     Treatment Plan Medications:  (100% adherent with imatinib)     Imatinib dose adjusted for weight at DI, Day 29 to Imatinib 600 mg PO daily in AM     Evaluations / Study Labs:  (2/24/2023)      None required     Therapy Given: (2/24/2023)     Imatinib 600 mg PO QAM     Toxicities:     **Grade 3 febrile neutropenia on 2/7/2023 (ANC <1000/mm3 with a single temperature of >38.3 degrees C (101 degrees F) or a sustained temperature of >=38 degrees C (100.4 degrees F) for more than one hour)  ** Grade 3 diarrhea on 2/6/2023 (Increase of >=7 stools per day  over baseline; hospitalization indicated, limiting self care ADL): Resolved 2/7/2023  ** Grade 2 diarrhea on 2/7/2023 (Increase of 4 - 6 stools per day  over baseline; limiting instrumental ADL) Resolved 2/8/2023  ** Grade 3 vomiting on 2/6/2023 and 2/7/2023 (hospitalization indicated) Resolved 2/8/2023  ** Grade 2 Myositis (Moderate pain associated with weakness; pain limiting, instrumental ADL): Present on admission on 2/7/2023  ** Grade 3 Hypoxemia (Decreased oxygen saturation at rest (e.g., pulse oximeter  <88% or PaO2 <=55 mm Hg), started 2/10/2023 (RESOLVED 2/24/2023)  ** Grade 3 soft tissue infection (invasive intervention indicated - TREATING)          Disposition: Inpatient for treatment of left should infection and pain      Pepe Faye MD  Pediatric Hematology / Oncology  [facility]  Cell.  [phone]  [facility]. [phone]

## 2023-02-24 NOTE — PROGRESS NOTES
Pt demonstrates ability to turn self in bed without assistance of staff. Patient and family understands importance in prevention of skin breakdown, ulcers, and potential infection. Hourly rounding in effect. RN skin check complete.   Devices in place include: Port accessed.  Skin assessed under devices: Yes.  Confirmed HAPI identified on the following date: NA   Location of HAPI: NA.  Wound Care RN following: No.  The following interventions are in place: Skin assessed every 4 hours or more frequently as needed.

## 2023-02-25 PROCEDURE — 700102 HCHG RX REV CODE 250 W/ 637 OVERRIDE(OP): Performed by: PEDIATRICS

## 2023-02-25 PROCEDURE — 770003 HCHG ROOM/CARE - PEDIATRIC PRIVATE*

## 2023-02-25 PROCEDURE — 700105 HCHG RX REV CODE 258: Performed by: PEDIATRICS

## 2023-02-25 PROCEDURE — 700111 HCHG RX REV CODE 636 W/ 250 OVERRIDE (IP): Performed by: PEDIATRICS

## 2023-02-25 PROCEDURE — A9270 NON-COVERED ITEM OR SERVICE: HCPCS | Performed by: PEDIATRICS

## 2023-02-25 PROCEDURE — 700101 HCHG RX REV CODE 250: Performed by: PEDIATRICS

## 2023-02-25 PROCEDURE — 99232 SBSQ HOSP IP/OBS MODERATE 35: CPT | Performed by: PEDIATRICS

## 2023-02-25 RX ORDER — SULFAMETHOXAZOLE AND TRIMETHOPRIM 800; 160 MG/1; MG/1
1 TABLET ORAL EVERY 12 HOURS
Status: COMPLETED | OUTPATIENT
Start: 2023-02-26 | End: 2023-02-26

## 2023-02-25 RX ADMIN — ONDANSETRON 8 MG: 4 TABLET, ORALLY DISINTEGRATING ORAL at 16:02

## 2023-02-25 RX ADMIN — Medication 1000 UNITS: at 06:15

## 2023-02-25 RX ADMIN — METRONIDAZOLE 500 MG: 5 INJECTION, SOLUTION INTRAVENOUS at 22:30

## 2023-02-25 RX ADMIN — CHLORHEXIDINE GLUCONATE 15 ML: 1.2 RINSE ORAL at 18:46

## 2023-02-25 RX ADMIN — DEXTROSE AND SODIUM CHLORIDE: 5; 450 INJECTION, SOLUTION INTRAVENOUS at 17:13

## 2023-02-25 RX ADMIN — Medication: at 05:01

## 2023-02-25 RX ADMIN — SULFAMETHOXAZOLE AND TRIMETHOPRIM 2 TABLET: 800; 160 TABLET ORAL at 09:26

## 2023-02-25 RX ADMIN — APIXABAN 2.5 MG: 2.5 TABLET, FILM COATED ORAL at 06:15

## 2023-02-25 RX ADMIN — FAMOTIDINE 20 MG: 20 TABLET ORAL at 06:15

## 2023-02-25 RX ADMIN — METRONIDAZOLE 500 MG: 5 INJECTION, SOLUTION INTRAVENOUS at 06:14

## 2023-02-25 RX ADMIN — FAMOTIDINE 20 MG: 20 TABLET ORAL at 18:46

## 2023-02-25 RX ADMIN — IMATINIB MESYLATE 400 MG: 400 TABLET, FILM COATED ORAL at 06:15

## 2023-02-25 RX ADMIN — METRONIDAZOLE 500 MG: 5 INJECTION, SOLUTION INTRAVENOUS at 14:20

## 2023-02-25 RX ADMIN — CHLORHEXIDINE GLUCONATE 15 ML: 1.2 RINSE ORAL at 06:14

## 2023-02-25 RX ADMIN — DEXTROSE AND SODIUM CHLORIDE: 5; 450 INJECTION, SOLUTION INTRAVENOUS at 04:34

## 2023-02-25 RX ADMIN — IMATINIB MESYLATE 200 MG: 100 TABLET, FILM COATED ORAL at 06:15

## 2023-02-25 RX ADMIN — Medication: at 22:35

## 2023-02-25 ASSESSMENT — PAIN DESCRIPTION - PAIN TYPE
TYPE: ACUTE PAIN

## 2023-02-25 ASSESSMENT — FIBROSIS 4 INDEX: FIB4 SCORE: 0.83

## 2023-02-25 NOTE — CARE PLAN
The patient is Watcher - Medium risk of patient condition declining or worsening    Shift Goals  Clinical Goals: Pain control  Patient Goals: Pain control, home soon  Family Goals: No family preseent    Progress made toward(s) clinical / shift goals:    Problem: Knowledge Deficit - Standard  Goal: Patient and family/care givers will demonstrate understanding of plan of care, disease process/condition, diagnostic tests and medications  Outcome: Progressing  Note: Pt VU of need to remain in hospital until pain is better controlled.        Patient is not progressing towards the following goals:      Problem: Respiratory  Goal: Patient will achieve/maintain optimum respiratory ventilation and gas exchange  Outcome: Not Progressing  Note: Pt still requiring 0.25 LPM O2 intermittently while on PCA.

## 2023-02-25 NOTE — PROGRESS NOTES
Pediatric Hematology/Oncology  Daily Progress Note      Patient Name:  Tomas Jean-Baptiste  : 2000  MRN: [id number]    Location of Service:  [facility] - [facility]  Date of Service: 2023  Time: 10:26 AM    Hospital Day: 19    Protocol / Treatment Plan: [place] Chromosome Precursor B-Cell Acute Lymphoblastic Leukemia, ON STUDY [id number], Delayed Intensification, Day 68     SUBJECTIVE:     No acute events overnight.  Afebrile Tmax 98.5 °F.   JULY does however report that he feels his pain was not as well controlled last night and was quite bothersome.  Overnight nurse reports 13 attempts with 9 deliveries from PCA.  JULY states that he does not feel comfortable going down further on his rate today given the increase in the pain.  He also reports that he is a bit groggy and tired again this morning.  No complaints of any headaches, changes in vision or neurologic status changes.  No complaints of any cough, congestion or difficulty breathing.  No shortness of breath.  Did again require nasal cannula oxygen overnight at 1/4 to 1/2 L.  JULY denies any nausea or vomiting.  He still has some difficulty with solid foods and did not make very much round yesterday.  He reports that he does not have any other aches and pains with the exception of his left shoulder.  He reports that physical therapy was difficult yesterday and that he remains tired from it.  He does not have any complaints of any constipation or diarrhea.  No complaints of any skin changes or rashes.  No other concerns or complaints at this time.    Review of Systems:     Constitutional: Afebrile, feeling more tired and exhausted today  HENT: Negative for auditory changes or ear pain, no nosebleeds, mild congestion and rhinorrhea, no sore throat.  No mouth sores.  Eyes: Negative for visual changes.  Respiratory: Negative for shortness of breath.  Does have oxygen requirement.  Cardiovascular: Negative.  Gastrointestinal:  "Negative for nausea, vomiting, abdominal pain, diarrhea, constipation.  Genitourinary: Negative.  Musculoskeletal: Increased left shoulder pain overnight.  Skin: Negative for rash or skin infection.  Neurological: Negative for numbness, tingling, sensory changes, weakness or headaches.    Endo/Heme/Allergies: No bruising/bleeding easily.    Psychiatric/Behavioral: Tired    OBJECTIVE:     Max Temp: Temp (24hrs), Av.8 °C (98.2 °F), Min:36.6 °C (97.8 °F), Max:36.9 °C (98.5 °F)    Vitals: /68   Pulse 97   Temp 36.6 °C (97.9 °F) (Temporal)   Resp 16   Ht 1.651 m (5' 5\")   Wt 67.1 kg (147 lb 14.9 oz)   SpO2 95%   BMI 24.62 kg/m²     I/O:   Intake/Output Summary (Last 24 hours) at 2023 1026  Last data filed at 2023 0900  Gross per 24 hour   Intake 2397.48 ml   Output 2170 ml   Net 227.48 ml       Labs:    Most Recent Hematology Labs:    Lab Results   Component Value Date/Time    WBC 4.0 (L) 2023 1200    HEMOGLOBIN 9.4 (L) 2023 1200    MCV 85.5 2023 1200    PLATELETCT 120 (L) 2023 1200    NEUTS 3.27 2023 1200       Most Recent Metabolic Panel:    Lab Results   Component Value Date/Time    POTASSIUM 3.7 2023 0340    CHLORIDE 105 2023 0340    CO2 24 2023 0340    GLUCOSE 110 (H) 2023 0340    BUN 6 (L) 2023 0340    CREATININE 0.26 (L) 2023 0340    CALCIUM 7.1 (L) 2023 0340    ANION 8.0 2023 0340     Physical Exam:    Constitutional: Tired appearing  HENT: Normocephalic and atraumatic. Alopecia.  No rhinorrhea. Oropharynx is clear and moist.   Eyes: Conjunctivae are normal. EOMI.   Neck: Normal range of motion of neck, no adenopathy.    Cardiovascular: Normal rate, regular rhythm.  No murmur. DP/radial pulses 2+, cap refill < 2 sec.  Pulmonary/Chest: Effort normal. No respiratory distress. Symmetric expansion.  No crackles or wheezes.  Abdomen: Soft. Bowel sounds are normal. No distension and no mass. There is no " hepatosplenomegaly.    Genitourinary:  Deferred.  Musculoskeletal: Again with improved mobility of left shoulder  Neurological: Alert and oriented to person and place. Exhibits normal muscle tone bilaterally in upper and lower extremities. Gait normal. Coordination normal.    Skin: Skin is warm, dry and pink.  No rash or evidence of skin infection.   Psychiatric: Mood flat and tired today.    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 and whose End of Induction IB MRD was both negative by flow cytometry and PCR who was admitted at Day 50 of Delayed Intensification with fever and neutropenia found to have a left shoulder joint and soft tissue infection with Bacteriodes fragilis     1) Soft Tissue Infection / Joint Infection with Bacteroides fragilis:  - Edema and pain continue to improve  - X-ray L shoulder 2/8/2023: not concerning  - MRI shoulder 2/10/2023: showing diffuse myositis and some intermuscular edema/fluid  - No evidence of DVT  - Was initially on cefepime for F&N, added IV Vancomycin on 2/10/2023, discontinued both cefepime and vancomycin on 2/20/2023 after start of IV Flagyl (below)  - S/P open exploration/drainage 2/17/2023  - Culture: B.fragilis (both deep and superficial cultures)   - ID Consultation with Dr. Singh - recommendation for swithc to metronidazole for 4+ weeks  - Flagyl 500 mg IV Q8 hours (Day 7)     ** Spoke again with Dr. Singh - will attempt total of 6 weeks of Flagyl - if neuropathies complicate care can give 4+ weeks of therapy   - IgG adequate at 525     2) Left Shoulder Pain:  - Improved pain again overnight  - Increased boluses from PCA (13 attempts, 9 deliveries just overnight)  - Current PCA settings: Dilaudid basal rate 0.4 mg/hr, bolus dose to 0.4 mg  - Will keep current settings without any further changes     3) Febrile Neutropenia in Immunocompromised  Host: (RESOLVED)  - Previous history of gram negative septic shock  - Blood culture from port 2/7/2023: NGTD  - Repeat blood culture from port 2/9/2023: NGTD  - IV Cefepime every 8 hrs (Discontinued 2/20/2023)  - IV Vancomycin started on 2/10/2023 (Discontinued 2/20/2023)     4) Tachycardia:   - Some PVCs noted on tele but hemodynamically stable  - Recently admitted with severe septic shock, hence consider cardiomyopathy  - Consulted cardiology (Dr Galvez): Holter monitor and repeat ECHO essentially unremarkable  - No additional interventions  - As orthostasis and deconditioning are consideration  - Working with PT/OT     5) At Risk of Opioid Induced Constipation:   - Senna-docusate BID  - Encouraged ambulation as tolerated.  - Stooling regularly     6) Retrosternal Chest Pain (RESOLVED):  - Likely GERD  - Continue famotidine 20 mg BID  - Continue to monitor     7) Poor Appetite / Nausea:  - Reports nausea specifically when eating solids  - Encouraged slowly advancing diet     8) Hypoxemia (WORSENED):   - Able to wean off nasal cannula oxygen today following blood transfusion              - Continue Incentive Spirometry                       - Ambulation as tolerated              - Up and to chair as tolerated     9) Ph+ Precursor B-Cell Acute Lymphoblastic Leukemia, in MRD Remission:     [id number], AR Arm B, Delayed Intensification, Day 68: THERAPY COMPLETE   ** Interim Maintenance ON HOLD pending improvement (will likely be delayed by 12-14 days)     ** Plan to start Interim Maintenance 2/26 or 2/27 depending on clinical improvement     ** Continue imatinib 600 mg PO daily                     10) Chemotherapy Related Pancytopenia:  - WBC 4.0, Hgb 9.4, platelets 120 following PRBC transfusion yesterday  - ANC 3270,  yesterday  - No transfusion indicated  - Transfused pRBCs last on 2/16/2023 and again 2/22/2023  - Transfuse for hemoglobin less than 7.5 or symptomatic  - Transfuse for platelets less than  10,000 or symptomatic  - CBC every Monday and Thursday     11) At Risk for DVT Secondary to PEG Asparaginase: (LESSENING)  - Platelets >50k  - Currently on apixaban 2.5 mg PO BID (HELD)  - Now 24 days out from PEG Asparaginase - low risk for therapy related clot however still with low protein and globulin  - Also at risk given acute illness and lack of ambulation              - Will hold apixaban tomorrow and on Saturday in anticipation of possible Interim Maintenance II Day 1 IT methotrexate                12) At Risk of Opportunistic Lung Infection:  - Bactrim DS PO BID Sat and Sun for PJP prophylaxis     13) Central Access:   - R Port-A-Cath in place      Research Participant:           [facility] - Source Data         Diagnosis: Ph+ Precursor B-Cell Acute Lymphoblastic Leukemia     Disease Status: EOI1A MRD NEGATIVE, EOI1B RD NEGATIVE, CNS3c, testicular negative, HSV1 IgG POSITIVE, CMV IgG NEGATIVE, VARICELLA IgG POSITIVE     Active Studies: [id number], [id number]                                                                                                      Inactive Studies: [id number]                                                                                                                                                Optional Studies: None             Protocol: International Phase 3 trial in [place] 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      Treatment Plan: [id number](OS), AR-Arm B, Delayed Intensification, Day 68     Height: 1.650 m      Weight: 64.2kg       BSA: 1.72 m²   (Delayed Intensification Day 29 1/17/2023)                                                                                                                                           Performance Status: Karnofsky 60, ECOG  3 (2/24/2023)     Treatment Plan Medications:  (100% adherent with imatinib)     Imatinib  dose adjusted for weight at DI, Day 29 to Imatinib 600 mg PO daily in AM     Evaluations / Study Labs:  (2/25/2023)      None required     Therapy Given: (2/25/2023)     Imatinib 600 mg PO QAM     Toxicities:     **Grade 3 febrile neutropenia on 2/7/2023 (ANC <1000/mm3 with a single temperature of >38.3 degrees C (101 degrees F) or a sustained temperature of >=38 degrees C (100.4 degrees F) for more than one hour)  ** Grade 3 diarrhea on 2/6/2023 (Increase of >=7 stools per day  over baseline; hospitalization indicated, limiting self care ADL): Resolved 2/7/2023  ** Grade 2 diarrhea on 2/7/2023 (Increase of 4 - 6 stools per day  over baseline; limiting instrumental ADL) Resolved 2/8/2023  ** Grade 3 vomiting on 2/6/2023 and 2/7/2023 (hospitalization indicated) Resolved 2/8/2023  ** Grade 2 Myositis (Moderate pain associated with weakness; pain limiting, instrumental ADL): Present on admission on 2/7/2023  ** Grade 3 Hypoxemia (Decreased oxygen saturation at rest (e.g., pulse oximeter  <88% or PaO2 <=55 mm Hg), started 2/10/2023 (RESOLVED 2/24/2023)  ** Grade 3 soft tissue infection (invasive intervention indicated - TREATING)          Disposition: Inpatient for treatment of left should infection and pain         Pepe Faye MD  Pediatric Hematology / Oncology  [facility].  [phone]  [facility]. [phone]

## 2023-02-25 NOTE — CARE PLAN
Problem: Fluid Volume  Goal: Fluid volume balance will be maintained  Outcome: Progressing  Note: IVF infusing; po intake decreased     Problem: Pain - Standard  Goal: Alleviation of pain or a reduction in pain to the patient’s comfort goal  Outcome: Progressing  Note: Controlled with PCA dilauded pump and cold pack to LUE   The patient is Watcher - Medium risk of patient condition declining or worsening    Shift Goals  Clinical Goals: pain control; wean O2 while sleeping; VSS; increase activity  Patient Goals: rest;pain control; shower  Family Goals: felicita

## 2023-02-25 NOTE — PROGRESS NOTES
Pt demonstrates ability to turn self in bed without assistance of staff. Patient understands importance in prevention of skin breakdown, ulcers, and potential infection. Hourly rounding in effect. RN skin check complete.   Devices in place include: R chest port, .  Skin assessed under devices: Yes.  Confirmed HAPI identified on the following date: N/A   Location of HAPI: N/A.  Wound Care RN following: No.  The following interventions are in place: Skin checked with each assessment, CHG bath Q shift.

## 2023-02-26 PROCEDURE — A9270 NON-COVERED ITEM OR SERVICE: HCPCS | Performed by: PEDIATRICS

## 2023-02-26 PROCEDURE — 770003 HCHG ROOM/CARE - PEDIATRIC PRIVATE*

## 2023-02-26 PROCEDURE — 700102 HCHG RX REV CODE 250 W/ 637 OVERRIDE(OP): Performed by: PEDIATRICS

## 2023-02-26 PROCEDURE — 700111 HCHG RX REV CODE 636 W/ 250 OVERRIDE (IP): Performed by: PEDIATRICS

## 2023-02-26 PROCEDURE — 700101 HCHG RX REV CODE 250: Performed by: PEDIATRICS

## 2023-02-26 PROCEDURE — 700105 HCHG RX REV CODE 258: Performed by: PEDIATRICS

## 2023-02-26 PROCEDURE — 99233 SBSQ HOSP IP/OBS HIGH 50: CPT | Performed by: PEDIATRICS

## 2023-02-26 RX ORDER — ACETAMINOPHEN 325 MG/1
650 TABLET ORAL EVERY 6 HOURS PRN
Status: DISCONTINUED | OUTPATIENT
Start: 2023-02-26 | End: 2023-03-01 | Stop reason: HOSPADM

## 2023-02-26 RX ADMIN — FAMOTIDINE 20 MG: 20 TABLET ORAL at 06:19

## 2023-02-26 RX ADMIN — CHLORHEXIDINE GLUCONATE 15 ML: 1.2 RINSE ORAL at 06:19

## 2023-02-26 RX ADMIN — FAMOTIDINE 20 MG: 20 TABLET ORAL at 18:40

## 2023-02-26 RX ADMIN — METRONIDAZOLE 500 MG: 5 INJECTION, SOLUTION INTRAVENOUS at 14:01

## 2023-02-26 RX ADMIN — IMATINIB MESYLATE 200 MG: 100 TABLET, FILM COATED ORAL at 06:17

## 2023-02-26 RX ADMIN — SULFAMETHOXAZOLE AND TRIMETHOPRIM 1 TABLET: 800; 160 TABLET ORAL at 06:16

## 2023-02-26 RX ADMIN — ONDANSETRON 8 MG: 4 TABLET, ORALLY DISINTEGRATING ORAL at 14:08

## 2023-02-26 RX ADMIN — METRONIDAZOLE 500 MG: 5 INJECTION, SOLUTION INTRAVENOUS at 21:56

## 2023-02-26 RX ADMIN — CHLORHEXIDINE GLUCONATE 15 ML: 1.2 RINSE ORAL at 18:40

## 2023-02-26 RX ADMIN — METRONIDAZOLE 500 MG: 5 INJECTION, SOLUTION INTRAVENOUS at 06:16

## 2023-02-26 RX ADMIN — SULFAMETHOXAZOLE AND TRIMETHOPRIM 1 TABLET: 800; 160 TABLET ORAL at 18:40

## 2023-02-26 RX ADMIN — ACETAMINOPHEN 650 MG: 325 TABLET, FILM COATED ORAL at 15:40

## 2023-02-26 RX ADMIN — Medication 1000 UNITS: at 06:19

## 2023-02-26 RX ADMIN — DEXTROSE AND SODIUM CHLORIDE: 5; 450 INJECTION, SOLUTION INTRAVENOUS at 17:02

## 2023-02-26 RX ADMIN — IMATINIB MESYLATE 400 MG: 400 TABLET, FILM COATED ORAL at 06:17

## 2023-02-26 RX ADMIN — Medication: at 19:20

## 2023-02-26 RX ADMIN — DEXTROSE AND SODIUM CHLORIDE: 5; 450 INJECTION, SOLUTION INTRAVENOUS at 04:51

## 2023-02-26 ASSESSMENT — PAIN DESCRIPTION - PAIN TYPE
TYPE: ACUTE PAIN

## 2023-02-26 NOTE — PROGRESS NOTES
Pt demonstrates ability to turn self in bed without assistance of staff. Patient understands importance in prevention of skin breakdown, ulcers, and potential infection. Hourly rounding in effect. RN skin check complete.   Devices in place include: Chest port, , NC.  Skin assessed under devices: Yes.  Confirmed HAPI identified on the following date: N/A   Location of HAPI: N/A.  Wound Care RN following: No.  The following interventions are in place: Skin checked with each assessment.

## 2023-02-27 LAB
ALBUMIN SERPL BCP-MCNC: 2.3 G/DL (ref 3.2–4.9)
ALBUMIN/GLOB SERPL: 1.2 G/DL
ALP SERPL-CCNC: 110 U/L (ref 30–99)
ALT SERPL-CCNC: 11 U/L (ref 2–50)
ANION GAP SERPL CALC-SCNC: 5 MMOL/L (ref 7–16)
AST SERPL-CCNC: 19 U/L (ref 12–45)
BASOPHILS # BLD AUTO: 0.3 % (ref 0–1.8)
BASOPHILS # BLD: 0.01 K/UL (ref 0–0.12)
BILIRUB CONJ SERPL-MCNC: <0.2 MG/DL (ref 0.1–0.5)
BILIRUB INDIRECT SERPL-MCNC: NORMAL MG/DL (ref 0–1)
BILIRUB SERPL-MCNC: 0.3 MG/DL (ref 0.1–1.5)
BUN SERPL-MCNC: 2 MG/DL (ref 8–22)
CALCIUM ALBUM COR SERPL-MCNC: 9 MG/DL (ref 8.5–10.5)
CALCIUM SERPL-MCNC: 7.6 MG/DL (ref 8.5–10.5)
CHLORIDE SERPL-SCNC: 104 MMOL/L (ref 96–112)
CO2 SERPL-SCNC: 27 MMOL/L (ref 20–33)
CREAT SERPL-MCNC: 0.33 MG/DL (ref 0.5–1.4)
CYTOLOGY REG CYTOL: NORMAL
EOSINOPHIL # BLD AUTO: 0 K/UL (ref 0–0.51)
EOSINOPHIL NFR BLD: 0 % (ref 0–6.9)
ERYTHROCYTE [DISTWIDTH] IN BLOOD BY AUTOMATED COUNT: 52.5 FL (ref 35.9–50)
GFR SERPLBLD CREATININE-BSD FMLA CKD-EPI: 168 ML/MIN/1.73 M 2
GLOBULIN SER CALC-MCNC: 1.9 G/DL (ref 1.9–3.5)
GLUCOSE SERPL-MCNC: 108 MG/DL (ref 65–99)
HCT VFR BLD AUTO: 26.2 % (ref 42–52)
HGB BLD-MCNC: 8.5 G/DL (ref 14–18)
IMM GRANULOCYTES # BLD AUTO: 0.03 K/UL (ref 0–0.11)
IMM GRANULOCYTES NFR BLD AUTO: 1 % (ref 0–0.9)
LYMPHOCYTES # BLD AUTO: 0.36 K/UL (ref 1–4.8)
LYMPHOCYTES NFR BLD: 12.3 % (ref 22–41)
MCH RBC QN AUTO: 28.5 PG (ref 27–33)
MCHC RBC AUTO-ENTMCNC: 32.4 G/DL (ref 33.7–35.3)
MCV RBC AUTO: 87.9 FL (ref 81.4–97.8)
MONOCYTES # BLD AUTO: 0.39 K/UL (ref 0–0.85)
MONOCYTES NFR BLD AUTO: 13.3 % (ref 0–13.4)
MYCOBACTERIUM SPEC CULT: NORMAL
NEUTROPHILS # BLD AUTO: 2.14 K/UL (ref 1.82–7.42)
NEUTROPHILS NFR BLD: 73.1 % (ref 44–72)
NRBC # BLD AUTO: 0 K/UL
NRBC BLD-RTO: 0 /100 WBC
PLATELET # BLD AUTO: 124 K/UL (ref 164–446)
PMV BLD AUTO: 10.1 FL (ref 9–12.9)
POTASSIUM SERPL-SCNC: 3.8 MMOL/L (ref 3.6–5.5)
PROT SERPL-MCNC: 4.2 G/DL (ref 6–8.2)
RBC # BLD AUTO: 2.98 M/UL (ref 4.7–6.1)
RHODAMINE-AURAMINE STN SPEC: NORMAL
SIGNIFICANT IND 70042: NORMAL
SITE SITE: NORMAL
SODIUM SERPL-SCNC: 136 MMOL/L (ref 135–145)
SOURCE SOURCE: NORMAL
WBC # BLD AUTO: 2.9 K/UL (ref 4.8–10.8)

## 2023-02-27 PROCEDURE — 80053 COMPREHEN METABOLIC PANEL: CPT

## 2023-02-27 PROCEDURE — 700105 HCHG RX REV CODE 258: Performed by: PEDIATRICS

## 2023-02-27 PROCEDURE — 700102 HCHG RX REV CODE 250 W/ 637 OVERRIDE(OP): Performed by: PEDIATRICS

## 2023-02-27 PROCEDURE — 99233 SBSQ HOSP IP/OBS HIGH 50: CPT | Mod: 25 | Performed by: PEDIATRICS

## 2023-02-27 PROCEDURE — 85025 COMPLETE CBC W/AUTO DIFF WBC: CPT

## 2023-02-27 PROCEDURE — 700111 HCHG RX REV CODE 636 W/ 250 OVERRIDE (IP): Performed by: PEDIATRICS

## 2023-02-27 PROCEDURE — A9270 NON-COVERED ITEM OR SERVICE: HCPCS | Performed by: PEDIATRICS

## 2023-02-27 PROCEDURE — 96450 CHEMOTHERAPY INTO CNS: CPT | Performed by: PEDIATRICS

## 2023-02-27 PROCEDURE — 82248 BILIRUBIN DIRECT: CPT

## 2023-02-27 PROCEDURE — 770003 HCHG ROOM/CARE - PEDIATRIC PRIVATE*

## 2023-02-27 PROCEDURE — 700101 HCHG RX REV CODE 250: Performed by: PEDIATRICS

## 2023-02-27 PROCEDURE — 3E0R305 INTRODUCTION OF OTHER ANTINEOPLASTIC INTO SPINAL CANAL, PERCUTANEOUS APPROACH: ICD-10-PCS | Performed by: PEDIATRICS

## 2023-02-27 PROCEDURE — 88305 TISSUE EXAM BY PATHOLOGIST: CPT

## 2023-02-27 RX ORDER — ONDANSETRON 2 MG/ML
8 INJECTION INTRAMUSCULAR; INTRAVENOUS EVERY 8 HOURS PRN
Status: CANCELLED | OUTPATIENT
Start: 2023-02-27

## 2023-02-27 RX ORDER — DIPHENHYDRAMINE HYDROCHLORIDE 50 MG/ML
50 INJECTION INTRAMUSCULAR; INTRAVENOUS PRN
Status: CANCELLED | OUTPATIENT
Start: 2023-02-28

## 2023-02-27 RX ORDER — PROMETHAZINE HYDROCHLORIDE 6.25 MG/5ML
0.25 SYRUP ORAL EVERY 6 HOURS PRN
Status: CANCELLED | OUTPATIENT
Start: 2023-02-27

## 2023-02-27 RX ORDER — MIDAZOLAM HYDROCHLORIDE 1 MG/ML
2 INJECTION INTRAMUSCULAR; INTRAVENOUS ONCE
Status: COMPLETED | OUTPATIENT
Start: 2023-02-27 | End: 2023-02-27

## 2023-02-27 RX ORDER — PROMETHAZINE HYDROCHLORIDE 6.25 MG/5ML
0.25 SYRUP ORAL EVERY 6 HOURS PRN
Status: CANCELLED | OUTPATIENT
Start: 2023-02-28

## 2023-02-27 RX ORDER — DIPHENHYDRAMINE HYDROCHLORIDE 50 MG/ML
50 INJECTION INTRAMUSCULAR; INTRAVENOUS ONCE
Status: CANCELLED | OUTPATIENT
Start: 2023-02-28 | End: 2023-02-28

## 2023-02-27 RX ORDER — ONDANSETRON 2 MG/ML
8 INJECTION INTRAMUSCULAR; INTRAVENOUS EVERY 8 HOURS PRN
Status: CANCELLED | OUTPATIENT
Start: 2023-02-28

## 2023-02-27 RX ORDER — ONDANSETRON 2 MG/ML
8 INJECTION INTRAMUSCULAR; INTRAVENOUS ONCE
Status: COMPLETED | OUTPATIENT
Start: 2023-02-27 | End: 2023-02-27

## 2023-02-27 RX ORDER — OXYCODONE HCL 10 MG/1
10 TABLET, FILM COATED, EXTENDED RELEASE ORAL EVERY 8 HOURS
Status: DISCONTINUED | OUTPATIENT
Start: 2023-02-27 | End: 2023-03-01 | Stop reason: HOSPADM

## 2023-02-27 RX ADMIN — ONDANSETRON 8 MG: 2 INJECTION INTRAMUSCULAR; INTRAVENOUS at 16:09

## 2023-02-27 RX ADMIN — METHOTREXATE 173 MG: 25 INJECTION INTRA-ARTERIAL; INTRAMUSCULAR; INTRATHECAL; INTRAVENOUS at 16:41

## 2023-02-27 RX ADMIN — LORAZEPAM 0.5 MG: 2 INJECTION INTRAMUSCULAR; INTRAVENOUS at 17:09

## 2023-02-27 RX ADMIN — METHOTREXATE 12 MG: 25 INJECTION INTRA-ARTERIAL; INTRAMUSCULAR; INTRATHECAL; INTRAVENOUS at 11:40

## 2023-02-27 RX ADMIN — OXYCODONE HYDROCHLORIDE 10 MG: 10 TABLET, FILM COATED, EXTENDED RELEASE ORAL at 14:34

## 2023-02-27 RX ADMIN — CHLORHEXIDINE GLUCONATE 15 ML: 1.2 RINSE ORAL at 06:04

## 2023-02-27 RX ADMIN — METRONIDAZOLE 500 MG: 5 INJECTION, SOLUTION INTRAVENOUS at 22:42

## 2023-02-27 RX ADMIN — MIDAZOLAM HYDROCHLORIDE 2 MG: 1 INJECTION, SOLUTION INTRAMUSCULAR; INTRAVENOUS at 11:25

## 2023-02-27 RX ADMIN — IMATINIB MESYLATE 400 MG: 400 TABLET, FILM COATED ORAL at 06:05

## 2023-02-27 RX ADMIN — FAMOTIDINE 20 MG: 20 TABLET ORAL at 06:04

## 2023-02-27 RX ADMIN — IMATINIB MESYLATE 200 MG: 100 TABLET, FILM COATED ORAL at 06:05

## 2023-02-27 RX ADMIN — DEXTROSE AND SODIUM CHLORIDE: 5; 450 INJECTION, SOLUTION INTRAVENOUS at 10:56

## 2023-02-27 RX ADMIN — METRONIDAZOLE 500 MG: 5 INJECTION, SOLUTION INTRAVENOUS at 06:04

## 2023-02-27 RX ADMIN — OXYCODONE HYDROCHLORIDE 10 MG: 10 TABLET, FILM COATED, EXTENDED RELEASE ORAL at 22:41

## 2023-02-27 RX ADMIN — ONDANSETRON 8 MG: 4 TABLET, ORALLY DISINTEGRATING ORAL at 10:10

## 2023-02-27 RX ADMIN — METRONIDAZOLE 500 MG: 5 INJECTION, SOLUTION INTRAVENOUS at 14:37

## 2023-02-27 RX ADMIN — FAMOTIDINE 20 MG: 20 TABLET ORAL at 22:41

## 2023-02-27 RX ADMIN — VINCRISTINE SULFATE 2 MG: 1 INJECTION, SOLUTION INTRAVENOUS at 16:29

## 2023-02-27 RX ADMIN — Medication 1000 UNITS: at 06:04

## 2023-02-27 RX ADMIN — Medication: at 10:04

## 2023-02-27 RX ADMIN — DEXTROSE AND SODIUM CHLORIDE: 5; 450 INJECTION, SOLUTION INTRAVENOUS at 04:58

## 2023-02-27 ASSESSMENT — PAIN DESCRIPTION - PAIN TYPE
TYPE: ACUTE PAIN
TYPE: ACUTE PAIN

## 2023-02-27 ASSESSMENT — FIBROSIS 4 INDEX: FIB4 SCORE: 0.8

## 2023-02-27 NOTE — PROGRESS NOTES
"RN called for a \"beanie\" for pts head to go outside for some fresh air, even though it's cold. Provided a \"beanie\" for pt.   Also, gave RN [facility].      "

## 2023-02-27 NOTE — CARE PLAN
The patient is Watcher - Medium risk of patient condition declining or worsening    Shift Goals  Clinical Goals: Pain control, plan for chemo today or tomorrow  Patient Goals: Pain control  Family Goals: No family present    Progress made toward(s) clinical / shift goals:    Problem: Psychosocial  Goal: Patient will experience minimized separation anxiety and fear  Outcome: Progressing  Note: Took pt off the floor and outside to the [facility] for a few minutes for fresh air. Pt able to ambulate over 200 feet.     Problem: Respiratory  Goal: Patient will achieve/maintain optimum respiratory ventilation and gas exchange  Outcome: Progressing  Note: Pt has been in RA since this morning and is tolerating well.       Patient is not progressing towards the following goals:  NA

## 2023-02-27 NOTE — PROGRESS NOTES
"Pharmacy chemotherapy verification    Patient Name: Tomas Jean-Baptiste   Dx: pH+ B-Cell ALL           Protocol: Capizzi MTX IM - Day 1 (On Study Arm B, ID number: [id number])     *Dosing Reference*      Allergies:  Amoxicillin     /59   Pulse 69   Temp 36.3 °C (97.4 °F) (Temporal)   Resp 15   Ht 1.651 m (5' 5\")   Wt 64.9 kg (143 lb 1.3 oz)   SpO2 97%   BMI 23.81 kg/m²  Body surface area is 1.73 meters squared.    All lab results 2/27/23 within treatment parameters.      Drug Order   (Drug name, dose, route, IV Fluid & volume, frequency, number of doses) Cycle: IM D1      Previous treatment: DI D50 2/7/23   Medication = methotrexate  Base Dose= 12mg fixed dose  Calc Dose: n/a  Final Dose = 12mg  Route = INTRATHECAL  Fluid & Volume = PF NS 6mL  Admin Duration = IT per MD   Day 1 and 31       No calculation required  ok to treat with final dose   Medication = vincristine  Base Dose= 1.5mg/m2  Calc Dose: Base Dose x 1.73m2 = >2mg  Final Dose = 2mg  Route = IV  Fluid & Volume = NS 25 mL  Admin Duration = Over 5-10min   Days 1,11,21,31,and 41       <10% difference, ok to treat with max final dose   Medication = methotrexate  Base Dose= 100mg/m2  Calc Dose:Base Dose x 1.73m2 = 173mg  Final Dose = 173mg  Route = IV  Fluid & Volume = NS 50 mL  Admin Duration = Over 15min   Days 1,11,21,31,and 41        <10% difference, ok to treat with final dose   Medication = pegaspargase  Base Dose= 2500units/m2  Calc Dose: Base Dose x 1.73m2 = 4325units  Final Dose = ---units  Route = IV  Fluid & Volume =  mL  Admin Duration = Over 2 hours   Days 2 and 22       <10% difference, ok to treat with final dose   By my signature below, I confirm this process was performed independently with the BSA and all final chemotherapy dosing calculations congruent. I have reviewed the above chemotherapy order and that my calculation of the final dose and BSA (when applicable) corroborate those calculations of the  " pharmacist. Discrepancies of 10% or greater in the written dose have been addressed and documented within the EPIC Progress notes.    JAE Wilson Pharm.D.

## 2023-02-27 NOTE — PROGRESS NOTES
"Pharmacy Chemotherapy Calculations Note:    Dx: B-ALL  Cycle:  Interim Maintenance I Day 1 (delayed for hospitalization)   Previous treatment: DI Day 50 on 2/7/23     Protocol: Interim Maintenance per Arm B of [id number] [place]: [id number]            /59   Pulse 69   Temp 35.9 °C (96.7 °F) (Temporal)   Resp 15   Ht 1.651 m (5' 5\")   Wt 64.9 kg (143 lb 1.3 oz)   SpO2 97%   BMI 23.81 kg/m²  Body surface area is 1.73 meters squared.    Labs from 2/27/23 reviewed - all within treatment parameters.       IT methotrexate 12 mg fixed dose for age   No calc req'd, ok for final dose = 12 mg IT injection    Vincristine 1.5 mg/m² (max 2 mg) x 1.73 m² = 2.6 mg   Max 2 mg, ok for final dose = 2 mg IV    Methotrexate 100 mg/m² x 1.73 m² = 173 mg   <10% difference, okay to treat with final dose = 173 mg IV        Judy Bryant, PharmD, BCOP            "

## 2023-02-27 NOTE — CONSULTS
"Pediatric Intensivist Consultation   for   Minimal Sedation    Date: 2/27/2023     Time: 10:57 AM        Asked by Dr. Duenas to consult for sedation services    Chief complaint:  need for sedated lumbar puncture    Allergies:   Allergies   Allergen Reactions    Amoxicillin Rash     Reacted as an infant. No swelling or airway problems.  Tolerated cefepime June 2022       Details of Present Illness:  Tomas Roy \"JULY\" is a 22 y.o. male with Precursor B-Cell Lymphoblastic Leukemia who has been hospitalized on the pediatric heme/onc service for febrile neutropenia. His course has been complication by a left arm myositis growing bacteroides. He has been on a dilaudid PCA for the past 2 weeks to manage pain.  Today he is to undergo a lumbar puncture and will get 2 mg versed prior to the procedure. Dr. Duenas asked me to be present during the procedure given JULY will receive a benzodiazepine while on an opiate infusion.     Reviewed past history. Patient is more fatigued today and has persistent left shoulder and arm pain. He was afebrile the past 24 hours. He was nauseated today and received zofran. No h/o complications with sedation, no h/o snoring or apnea. He is off oxygen but sats in the low 90's in room air. He is unlabored breathing and no obvious lung pathology.    Past Medical History:   Diagnosis Date    Cancer (HCC)     Leukoma        Family History   Problem Relation Age of Onset    No Known Problems Mother     Stroke Maternal Grandmother     Diabetes Paternal Grandfather     Hypertension Paternal Grandfather     Hyperlipidemia Paternal Grandfather     Cancer Neg Hx     Heart Disease Neg Hx        Physical Exam:  Blood pressure 110/68, pulse 77, temperature 36.2 °C (97.1 °F), temperature source Temporal, resp. rate 16, height 1.651 m (5' 5\"), weight 64.9 kg (143 lb 1.3 oz), SpO2 92 %.    General appearance: alert, well nourished, chronically ill young man sitting up in bed.  HEENT: alopecia, PERRL, EOMI, nares " clear, MMM, neck supple  Lungs: clear breath sounds, unlabored breathing  Heart: regular rate, no murmur or gallop, full and equal pulses  Abd: soft  Ext: left arm is swollen compared to right. Range of motion limited by pain - healing scar in place on left shoulder  Neuro: intact exam, no gross motor or sensory deficits, sitting up in bed, alert and oriented, flat affect  Skin: generalized pallor    No current facility-administered medications on file prior to encounter.     Current Outpatient Medications on File Prior to Encounter   Medication Sig Dispense Refill    imatinib (GLEEVEC) 100 MG tablet Take 200 mg by mouth every day. Pt takes 200 mg + 400 mg for a total dose of 600 mg daily      imatinib (GLEEVEC) 400 MG tablet Take 400 mg by mouth every day. Pt takes 200 mg + 400 mg for a total dose of 600 mg daily      sulfamethoxazole-trimethoprim (BACTRIM DS) 800-160 MG tablet Take 2 Tablets by mouth in the morning every Sat and Sun.      vitamin D3 (CHOLECALCIFEROL) 1000 Unit (25 mcg) Tab Take 1 Tablet by mouth every day.      therapeutic multivitamin-minerals (THERAGRAN-M) Tab Take 1 Tablet by mouth every day.           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);BCR-ABL1 ([facility]) 06/09/2022    Encounter for chemotherapy management 06/09/2022    Left abducens nerve palsy 06/03/2022    Myopia of both eyes 06/03/2022    Family history of diabetes mellitus 08/18/2020    Callus of foot 08/18/2020    Flat feet 04/03/2020         Plan:    Minimal sedation for: lumbar  puncture    ASA Classification: III    Planned Sedation/Anesthesia Agent:   versed IV while on dilaudid PCA    Airway Assessment:  an adequate airway, no risk factors, no craniofacial anomalies, no h/o difficult intubation      I have reassessed the patient just prior to the procedure and the patient remains an appropriate candidate to undergo the planned procedure and sedation:  Yes    I remained present throughout the procedure. Patient remained awake and communicative during the procedure. He had no labored breathing. He was on 3L oxygen during the procedure but had no respiratory symptoms.Patient was awake and well appearing when the procdure completed.    Time out called at 11:19, end time at 11:38.       The above note was signed by : Bette June , PICU Attending

## 2023-02-27 NOTE — PROGRESS NOTES
Pediatric Hematology/Oncology  Daily Progress Note      Patient Name:  Tomas Jean-Baptiste  : 2000  MRN: [id number]    Location of Service:  [facility]  Date of Service: 2023  Time: 12:00 PM    Hospital Day: 20    Protocol / Treatment Plan: [place] Chromosome Precursor B-Cell Acute Lymphoblastic Leukemia, ON STUDY [id number], Delayed Intensification, Day 69    SUBJECTIVE:     No acute events overnight.  Afebrile Tmax 98.6 °F. Tomas Roy reports that overall he is not feeling as well as he has in previous days.  He also reported that his pain was increased from previous days and nights.  Per nursing, he had 17 attempts with only 7 deliveries of medication overnight.  He reports that there is a new sharp pain located deep in his shoulder.  He still continues to experience improved range of motion.  He feels that the pain may be due to having slept on his shoulder wrong.  Dilaudid is still effective in treating the pain.  He does not want to wean on Dilaudid today.  Energy slightly decreased.  No headaches and no shortness of breath.  Not complaining of any changes in vision or neurologic status changes.  No complaints of any nausea, vomiting, diarrhea or constipation.  Stooling regularly.   Still has not been able to eat any solid food and is continuing to take primarily liquid diet.  No shortness of breath, difficulty breathing, cough or chest pain.  Did require oxygen overnight while sleeping however this morning has been weaned off of oxygen and is maintaining oxygen saturation.  No new rashes or skin changes.  Incision site of left shoulder is clean, dry and intact.  No other concerns or complaints at this time.    Review of Systems:     Constitutional: Afebrile, not feeling as well as previous days.  Decreased energy.  Decreased appetite and oral intake.  HENT: Negative.  Eyes: Negative for visual changes.  Respiratory: Negative for shortness of  "breath.  Cardiovascular: Negative.  Gastrointestinal: Negative for nausea, vomiting, abdominal pain, diarrhea, constipation.  Genitourinary: Negative.  Musculoskeletal: Increased left shoulder pain.  Skin: Negative for rash or skin infection.  Neurological: Negative for numbness, tingling, sensory changes, weakness or headaches.    Endo/Heme/Allergies: No bruising/bleeding easily.    Psychiatric/Behavioral: Flat mood.     OBJECTIVE:     Max Temp: Temp (24hrs), Av.7 °C (98 °F), Min:36.4 °C (97.5 °F), Max:37 °C (98.6 °F)    Vitals: BP (P) 121/71   Pulse 97   Temp (P) 37 °C (98.6 °F) (Temporal)   Resp 16   Ht 1.651 m (5' 5\")   Wt 66.1 kg (145 lb 11.6 oz)   SpO2 91%   BMI 24.25 kg/m²     I/O:   Intake/Output Summary (Last 24 hours) at 20231  Last data filed at 2023 2158  Gross per 24 hour   Intake 823.66 ml   Output 3075 ml   Net -2251.34 ml     Labs:    Most Recent Hematology Labs:    Lab Results   Component Value Date/Time    WBC 4.0 (L) 2023 1200    HEMOGLOBIN 9.4 (L) 2023 1200    MCV 85.5 2023 1200    PLATELETCT 120 (L) 2023 1200    NEUTS 3.27 2023 1200     Most Recent Metabolic Panel:    Lab Results   Component Value Date/Time    POTASSIUM 3.7 2023 0340    CHLORIDE 105 2023 0340    CO2 24 2023 0340    GLUCOSE 110 (H) 2023 0340    BUN 6 (L) 2023 034    CREATININE 0.26 (L) 2023 034    CALCIUM 7.1 (L) 2023 034    ANION 8.0 2023 0340       Physical Exam:    Constitutional: Overall well appearing.  Tired appearing.  HENT: Normocephalic and atraumatic. Alopecia.  No rhinorrhea. Oropharynx is clear and moist.   Eyes: Conjunctivae are normal. EOMI. nonicteric.  Neck: Normal range of motion of neck, no adenopathy.    Cardiovascular: Normal rate, regular rhythm.  No murmur. DP/radial pulses 2+, cap refill < 2 sec.  Pulmonary/Chest: Effort normall. No respiratory distress. Symmetric expansion.  No crackles or " wheezes.  Abdomen: Soft. Bowel sounds are normal. No distension and no mass. There is no hepatosplenomegaly.    Genitourinary:  Deferred  Musculoskeletal: Again with improved range of motion of left shoulder.  Still with poor strength in shoulder.    Neurological: Alert and oriented to person and place.  Gait not assessed.    Skin: Skin is warm, dry and pink.  No rash or evidence of skin infection.  Very well-healing left shoulder incision without any signs infection  Psychiatric: Mood still flat.    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 and whose End of Induction IB MRD was both negative by flow cytometry and PCR who was admitted at Day 50 of Delayed Intensification with fever and neutropenia found to have a left shoulder joint and soft tissue infection with Bacteriodes fragilis     1) Soft Tissue Infection / Joint Infection with Bacteroides fragilis:  - Edema and pain continue to improve  - X-ray L shoulder 2/8/2023: not concerning  - MRI shoulder 2/10/2023: showing diffuse myositis and some intermuscular edema/fluid  - No evidence of DVT  - Was initially on cefepime for F&N, added IV Vancomycin on 2/10/2023, discontinued both cefepime and vancomycin on 2/20/2023 after start of IV Flagyl (below)  - S/P open exploration/drainage 2/17/2023  - Culture: B.fragilis (both deep and superficial cultures)   - ID Consultation with Dr. Singh - recommendation for swithc to metronidazole for 4+ weeks  - Flagyl 500 mg IV Q8 hours (Day 8)     ** Spoke again with Dr. Singh - will attempt total of 6 weeks of Flagyl - if neuropathies complicate care can give 4+ weeks of therapy   - IgG adequate at 525     2) Left Shoulder Pain:  - Improved pain again overnight  - Increased boluses from PCA (17 attempts, 7 deliveries just overnight)  - Current PCA settings: Dilaudid basal rate 0.4 mg/hr, bolus dose to 0.4  mg  - Will keep current settings without any further changes     3) Febrile Neutropenia in Immunocompromised Host: (RESOLVED)  - Previous history of gram negative septic shock  - Blood culture from port 2/7/2023: NGTD  - Repeat blood culture from port 2/9/2023: NGTD  - IV Cefepime every 8 hrs (Discontinued 2/20/2023)  - IV Vancomycin started on 2/10/2023 (Discontinued 2/20/2023)     4) Tachycardia:   - Some PVCs noted on tele but hemodynamically stable  - Recently admitted with severe septic shock, hence consider cardiomyopathy  - Consulted cardiology (Dr Galvez): Holter monitor and repeat ECHO essentially unremarkable  - No additional interventions  - As orthostasis and deconditioning are consideration  - Working with PT/OT     5) At Risk of Opioid Induced Constipation:   - Senna-docusate BID  - Encouraged ambulation as tolerated.  - Stooling regularly     6) Retrosternal Chest Pain (RESOLVED):  - Likely GERD  - Continue famotidine 20 mg BID  - Continue to monitor     7) Poor Appetite / Nausea:  - Reports nausea specifically when eating solids  - Encouraged slowly advancing diet     8) Hypoxemia (IMPROVED):   - Able to wean off nasal cannula oxygen today following blood transfusion              - Continue Incentive Spirometry                       - Ambulation as tolerated              - Up and to chair as tolerated     9) Ph+ Precursor B-Cell Acute Lymphoblastic Leukemia, in MRD Remission:     [id number], AR Arm B, Delayed Intensification, Day 68: THERAPY COMPLETE   ** Interim Maintenance ON HOLD pending improvement (will likely be delayed by 12-14 days)     ** Plan to start Interim Maintenance tomorrow     ** Continue imatinib 600 mg PO daily                     10) Chemotherapy Related Pancytopenia:  - CBC in AM  - Transfused pRBCs last on 2/16/2023 and again 2/22/2023  - Transfuse for hemoglobin less than 7.5 or symptomatic  - Transfuse for platelets less than 10,000 or symptomatic  - CBC every Monday and  Thursday     11) At Risk for DVT Secondary to PEG Asparaginase: (LESSENING)  - Platelets >50k  - Currently on apixaban 2.5 mg PO BID (HELD)  - No longer at risk of acquired Antithrombin 3 deficiency secondary to PEG asparaginase  - Still at risk of thromboembolism secondary to inadequate ambulation, S ordered CDs              - Will hold apixaban again in anticipation of possible Interim Maintenance II Day 1 IT methotrexate tomorrow                12) At Risk of Opportunistic Lung Infection:  - Bactrim DS PO BID Sat and Sun for PJP prophylaxis     13) Central Access:   - R Port-A-Cath in place      Research Participant:           [facility] - Source Data         Diagnosis: Ph+ Precursor B-Cell Acute Lymphoblastic Leukemia     Disease Status: EOI1A MRD NEGATIVE, EOI1B RD NEGATIVE, CNS3c, testicular negative, HSV1 IgG POSITIVE, CMV IgG NEGATIVE, VARICELLA IgG POSITIVE     Active Studies: [id number], [id number]                                                                                                      Inactive Studies: [id number]                                                                                                                                                Optional Studies: None             Protocol: International Phase 3 trial in [place] 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      Treatment Plan: [id number]), AR-Arm B, Delayed Intensification, Day 69     Height: 1.650 m      Weight: 64.2kg       BSA: 1.72 m²   (Delayed Intensification Day 29 1/17/2023)                                                                                                                                           Performance Status: Karnofsky 60, ECOG  3 (2/24/2023)     Treatment Plan Medications:  (100% adherent with imatinib)     Imatinib dose adjusted for weight at DI, Day 29 to Imatinib 600 mg PO  daily in AM     Evaluations / Study Labs:  (2/26/2023)      None required     Therapy Given: (2/26/2023)     Imatinib 600 mg PO QAM     Toxicities:     **Grade 3 febrile neutropenia on 2/7/2023 (ANC <1000/mm3 with a single temperature of >38.3 degrees C (101 degrees F) or a sustained temperature of >=38 degrees C (100.4 degrees F) for more than one hour)  ** Grade 3 diarrhea on 2/6/2023 (Increase of >=7 stools per day  over baseline; hospitalization indicated, limiting self care ADL): Resolved 2/7/2023  ** Grade 2 diarrhea on 2/7/2023 (Increase of 4 - 6 stools per day  over baseline; limiting instrumental ADL) Resolved 2/8/2023  ** Grade 3 vomiting on 2/6/2023 and 2/7/2023 (hospitalization indicated) Resolved 2/8/2023  ** Grade 2 Myositis (Moderate pain associated with weakness; pain limiting, instrumental ADL): Present on admission on 2/7/2023  ** Grade 3 Hypoxemia (Decreased oxygen saturation at rest (e.g., pulse oximeter  <88% or PaO2 <=55 mm Hg), started 2/10/2023 (RESOLVED 2/24/2023)  ** Grade 3 soft tissue infection (invasive intervention indicated - TREATING)          Disposition: Inpatient for treatment of left should infection and pain         Pepe Faye MD  Pediatric Hematology / Oncology  [facility]  Cell.  [phone]  [facility]. [phone]

## 2023-02-27 NOTE — PROGRESS NOTES
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  JULY is still using his \"demand\" Dilaudid dose 1-2 time per hour, despite basal rate up to 0.5 mg/hr.    Wound cultures are growing B.fragilis!  Discussed management with Dr Singh (see below)    Review of Systems:     Constitutional: Afebrile.  \"Some\" appetite.  HENT: Negative for nosebleeds, congestion, rhinorrhea, sore throat, mouth sores.  Respiratory: Negative for shortness of breath or cough.   Cardiovascular: Negative for chest pain and leg swelling.    Gastrointestinal: Negative for nausea, vomiting, abdominal pain, diarrhea, constipation.     Skin: Negative for rash or skin infection..  Neurological: Negative for focal weakness or headaches.    Endo/Heme/Allergies: No bruising/bleeding easily.    Psychiatric/Behavioral: Fatigued     Medications:    Current Facility-Administered Medications:     ketorolac (TORADOL) injection 15 mg, 15 mg, Intravenous, Q6HRS, River Rodriguez M.D.    ondansetron (ZOFRAN ODT) dispertab 8 mg, 8 mg, Oral, Q6HRS PRN, River Rodriguez M.D.    HYDROmorphone (DILAUDID) 0.2 mg/mL in 50 mL NS (PCA), , Intravenous, Continuous, River Rodriguez M.D., " New patient note    Interventional spine and Pain  Physiatry (physical medicine and  Rehabilitation)     Date of service: See epic    Chief complaint:   Chief Complaint   Patient presents with    New Patient     Back pain        Referring provider: Leesa Brewer M.D.     HISTORY    HPI: Arias Nath 61 y.o.  who presents today with Diagnoses of Lumbar radiculopathy, Lumbar spondylosis, SI (sacroiliac) joint dysfunction, Chronic left-sided low back pain with left-sided sciatica, Obesity (BMI 30-39.9), and Exercise counseling were pertinent to this visit.    HPI    Acute on chronic left low back pain radiating down the left leg. Intermittent with flares. This can range from 8/10 in intensity. This has been worsening over the past 5 months. He does yoga and a provider driven home exercise program including the past year. Associated numbness and tingling down the left leg. He went to [facility] for orthopedics, reports hip is doing well and they think that this is a spine issue. Patient went to 20 sessions of physical therapy.        Medical records review:  I reviewed the note from the referring provider No ref. provider Leesa Brewer M.D.  including the note dated 12/23/2022.  Hypertension, prediabetic, hypercholesterolemia, obesity.  I also reviewed multiple previous notes and physical therapy at [facility]..           ROS:   Red Flags ROS:   Fever, Chills, Sweats: Denies  Involuntary Weight Loss: Denies  Bladder Incontinence: Denies  Bowel Incontinence: denies  Saddle Anesthesia: Denies    All other systems reviewed and negative.       PMHx:   Past Medical History:   Diagnosis Date    Acute left-sided back pain with sciatica 11/4/2022    Cough 5/11/2018    Essential hypertension 5/11/2018    History of malignant melanoma of skin 1/7/2022    Found in 10/21 treated with excision by Dr. Mitchell.    Hypercholesterolemia 11/6/2019    Hypertension     Olecranon bursitis of left elbow 6/27/2018    Vitamin D deficiency  9/3/2019         No current facility-administered medications on file prior to visit.     Current Outpatient Medications on File Prior to Visit   Medication Sig Dispense Refill    ibuprofen (MOTRIN) 200 MG Tab Take 400 mg by mouth every 6 hours as needed.      amLODIPine (NORVASC) 5 MG Tab Take 1 Tablet by mouth every day. 90 Tablet 3    losartan (COZAAR) 100 MG Tab Take 1 Tablet by mouth every day. 90 Tablet 3    rosuvastatin (CRESTOR) 5 MG Tab Take 1 Tablet by mouth every evening. 90 Tablet 3    Cholecalciferol (VITAMIN D3) 125 MCG (5000 UT) Cap Take 1 Cap by mouth every day. 30 Cap         PSHx:   Past Surgical History:   Procedure Laterality Date    KNEE ARTHROSCOPY Right 2005       Family history   Family History   Problem Relation Age of Onset    Colon Cancer Mother     Hypertension Mother     Hyperlipidemia Mother     No Known Problems Father          Medications: reviewed on epic.   Outpatient Medications Marked as Taking for the 2/27/23 encounter (Office Visit) with Maximus Hernández M.D.   Medication Sig Dispense Refill    ibuprofen (MOTRIN) 200 MG Tab Take 400 mg by mouth every 6 hours as needed.      amLODIPine (NORVASC) 5 MG Tab Take 1 Tablet by mouth every day. 90 Tablet 3    losartan (COZAAR) 100 MG Tab Take 1 Tablet by mouth every day. 90 Tablet 3    rosuvastatin (CRESTOR) 5 MG Tab Take 1 Tablet by mouth every evening. 90 Tablet 3    Cholecalciferol (VITAMIN D3) 125 MCG (5000 UT) Cap Take 1 Cap by mouth every day. 30 Cap         Allergies:   Allergies   Allergen Reactions    Ace Inhibitors Cough       Social Hx:   Social History     Socioeconomic History    Marital status:      Spouse name: Not on file    Number of children: Not on file    Years of education: Not on file    Highest education level: Not on file   Occupational History    Not on file   Tobacco Use    Smoking status: Never    Smokeless tobacco: Never   Substance and Sexual Activity    Alcohol use: Yes     Alcohol/week: 0.6 oz      Rate Verify at 23 0735    apixaban (ELIQUIS) tablet 2.5 mg, 2.5 mg, Oral, BID, River Rodriguez M.D., 2.5 mg at 23 0608    sulfamethoxazole-trimethoprim (BACTRIM DS) 800-160 MG tablet 1 Tablet, 1 Tablet, Oral, Q12HRS, River Rodriguez M.D., 1 Tablet at 23 0607    metroNIDAZOLE (Flagyl) IVPB 500 mg, 500 mg, Intravenous, Q8HRS, River Rodriguez M.D., Stopped at 23 0707    heparin lock flush 100 unit/mL injection 300-500 Units, 300-500 Units, Intracatheter, PRN, River Rodriguez M.D., 500 Units at 23 1648    Pharmacy Consult Request ...Pain Management Review 1 Each, 1 Each, Other, PHARMACY TO DOSE, River Rodriguez M.D.    [] acetaminophen (Tylenol) tablet 650 mg, 650 mg, Oral, Q6HRS, 650 mg at 23 1208 **FOLLOWED BY** acetaminophen (Tylenol) tablet 650 mg, 650 mg, Oral, Q6HRS PRN, River Rodriguez M.D., 650 mg at 23 0016    senna-docusate (PERICOLACE or SENOKOT S) 8.6-50 MG per tablet 1 Tablet, 1 Tablet, Oral, DAILY, Alyce Duenas M.D., 1 Tablet at 23 0605    lidocaine (LIDODERM) 5 % 1 Patch, 1 Patch, Transdermal, Q24HRS PRN, Alyce Duenas M.D.    LORazepam (ATIVAN) injection 0.5 mg, 0.5 mg, Intravenous, Q6HRS PRN, Alyce Duenas M.D.    lidocaine-prilocaine (EMLA) 2.5-2.5 % cream, , Topical, PRN, Alyce Duenas M.D.    D5 1/2 NS infusion, , Intravenous, Continuous, Alyce Duenas M.D., Last Rate: 30 mL/hr at 23 0832, New Bag at 23 0832    famotidine (PEPCID) tablet 20 mg, 20 mg, Oral, BID, Alyce Duenas M.D., 20 mg at 23 06    imatinib (Gleevec) tablet 200 mg, 200 mg, Oral, DAILY, Alyce Duenas M.D., 200 mg at 23 0608    imatinib (Gleevec) tablet 400 mg, 400 mg, Oral, DAILY, Alyce Duenas M.D., 400 mg at 23 0607    therapeutic multivitamin-minerals (THERAGRAN-M) tablet 1 Tablet, 1 Tablet, Oral, DAILY, Alyce Duenas M.D., 1 Tablet at 23 0600    vitamin D3 (cholecalciferol) tablet 1,000 Units, 1,000  "Types: 1 Cans of beer per week     Comment: social    Drug use: No    Sexual activity: Yes     Partners: Female     Comment: , govt contractor owner   Other Topics Concern    Not on file   Social History Narrative    Not on file     Social Determinants of Health     Financial Resource Strain: Not on file   Food Insecurity: Not on file   Transportation Needs: Not on file   Physical Activity: Not on file   Stress: Not on file   Social Connections: Not on file   Intimate Partner Violence: Not on file   Housing Stability: Not on file         EXAMINATION     Physical Exam:   Vitals: /72 (BP Location: Right arm, Patient Position: Sitting, BP Cuff Size: Adult)   Pulse 68   Temp 36.2 °C (97.1 °F) (Temporal)   Ht 1.803 m (5' 11\")   Wt 118 kg (260 lb 12.9 oz)   SpO2 97%     Constitutional:   Body Habitus: Body mass index is 36.37 kg/m².  Cooperation: Fully cooperates with exam  Appearance: Well-groomed, well-nourished, not disheveled     Eyes: No scleral icterus to suggest severe liver disease, no proptosis to suggest severe hyperthyroid    ENT -no obvious auditory deficits, no obvious tongue lesions, tongue midline, no facial droop     Skin -no rashes or lesions noted     Respiratory-  breathing comfortable on room air, no audible wheezing    Cardiovascular- capillary refills less than 2 seconds.     Psychiatric- alert and oriented ×3. Normal affect.     Gait - normal gait, no use of ambulatory device, nonantalgic. .     Musculoskeletal and Neuro -                Thoracic/Lumbar Spine/Sacral Spine/Hips   Inspection: No evidence of atrophy in bilateral lower extremities throughout     ROM: decreased active range of motion with flexion, lateral flexion, and rotation bilaterally.   There is decreased active range of motion with lumbar extension with pain.    There is pain with facet loading maneuver (extension rotation) with axial low back pain on the BILATERAL side(s)    Palpation:   No tenderness to " "Units, Oral, DAILY, Alyce Duenas M.D., 1,000 Units at 23 0607    OBJECTIVE:     Max Temp: Temp (24hrs), Av.7 °C (99.8 °F), Min:37 °C (98.6 °F), Max:38.4 °C (101.1 °F)      Vitals: /65   Pulse 97   Temp 37 °C (98.6 °F) (Temporal)   Resp 12   Ht 1.651 m (5' 5\")   Wt 66 kg (145 lb 8.1 oz)   SpO2 95%   BMI 24.21 kg/m²       Intake/Output Summary (Last 24 hours) at 2023 1040  Last data filed at 2023 1000  Gross per 24 hour   Intake 2563.83 ml   Output 3775 ml   Net -1211.17 ml       Labs:   Latest Reference Range & Units 23 03:40 23 05:58 23 06:15   WBC 4.8 - 10.8 K/uL 5.4 4.7 (L) 4.4 (L)   RBC 4.70 - 6.10 M/uL 3.01 (L) 2.66 (L) 2.67 (L)   Hemoglobin 14.0 - 18.0 g/dL 8.6 (L) 7.6 (L) 7.5 (L)   Hematocrit 42.0 - 52.0 % 26.2 (L) 23.4 (L) 23.6 (L)   Platelet Count 164 - 446 K/uL 56 (L) 62 (L) 75 (L)   MPV 9.0 - 12.9 fL 11.1 10.0 10.6   Neutrophils-Polys 44.00 - 72.00 % 82.80 (H) 88.80 (H) 86.20 (H)   Neutrophils (Absolute) 1.82 - 7.42 K/uL 4.56 4.17 3.91   Bands-Stabs 0.00 - 10.00 % 1.70  2.60   Lymphocytes 22.00 - 41.00 % 4.30 (L) 2.60 (L) 6.90 (L)   Lymphs (Absolute) 1.00 - 4.80 K/uL 0.23 (L) 0.12 (L) 0.30 (L)   Monocytes 0.00 - 13.40 % 10.30 6.90 4.30   Metamyelocytes % 0.90 0.90    Myelocytes %  0.80    Nucleated RBC /100 WBC 0.40 0.90 0.70       Physical Exam:    Constitutional: Sleepy, difficult to engage.   HENT: Normocephalic and atraumatic. Alopecia.  No rhinorrhea. Oropharynx is clear and moist.   Eyes: Conjunctivae are normal. No scleral icterus.   Neck: Supple, no adenopathy.    Cardiovascular: RRR with 2/6 pulmonic murmur.  Pulmonary/Chest: Effort normal. Symmetric expansion.  Clear to auscultation bilaterally.  Abdomen: Soft. Bowel sounds are normal. No distension, organomegaly or mass.     Skin:  No rash or evidence of skin infection. Port site clean and dry, accessed.       ASSESSMENT AND PLAN:     Tomas \"JULY\" Estiven is a 22 y.o. male who presented to the " "[facility] for Day 50 chemotherapy with Vincristine on 2/7/2023. During that appointment he was noted to be febrile with chills and dehydrated. Hence, decision made to admit him to pediatric ott for management of febrile neutropenia and dehydration.     Counts trending up. Arm pain/swelling increased dramatically on Feb 13.  Surgery on Feb 17 confirmed CT findings of soft tissue fluid collection (purulent) and joint effusion.     Febrile Neutropenia in an immunocompromised host: Resolved  - Previous history of gram negative septic shock  - Blood culture from port 2/7/2023: NGTD  - Repeat blood culture from port 2/9/2023: NGTD  - IV Cefepime every 8 hrs (D/C today)  - IV Vancomycin started on 2/10/2023; D/C'ed yesterday     Left shoulder pain: Abscess / joint effusion  - Edema and pain are marginally improved since yesterday  - X-ray L shoulder 2/8/2023: not concerning  - MRI shoulder 2/10/2023: showing diffuse myositis and some intermuscular edema/fluid  - No evidence of DVT  - Now s/p open exploration/drainage  - Marginal pain control with PCA Dilaudid:  Increase basal rate to 0.5 mg/hr  Increase demand dose to 0.4 mg  - Added IV Vancomycin on 2/10/2023  - Culture: B.fragilis (both deep and superficial cultures)   - Added metronidazole yesterday  - Discussed again today with Dr Singh (I.D.): he has reviewed the results in microbiology lab  - He suspects that this is a late sequela of JULY's episode of Gram-negative sepsis back in December; he was \"seeded\" with Bacteroides at that time, not eliminated by cefepime tx.  - Dr Singh advises OK to D/C cefepime  - Total course of metronidazole will be 4+ weeks; can swithc to p.o. at discharge  - Awaiting total IgG, consider IVIg supplementation    Pain management:  - Dilaudid PCA at 0.5 mg/hr basal rate; demand dose of 0.4 mg  - Can add ketorolac now with platelets trending up  - With addition of metronidazole, " palpation in midline at T1-T12 levels. No tenderness to palpation in the left and right of the midline T1-L5, NEGATIVE for tenderness to palpation to the para-midline region in the lower lumbar levels.  palpation over SI joint: negative bilaterally    palpation in hip or over the gluteus medius tendon insertion: negative bilaterally      Lumbar spine Special tests  Neuro tension  Straight leg test positive bilaterally    Slump test positive bilaterally      HIP  FAIR test negative bilaterally    Range of motion in the RIGHT hip is full  in flexion, extension, abduction, internal rotation, external rotation.  Range of motion in the LEFT hip is full  in flexion, extension, abduction, internal rotation, external rotation.      SI joint tests  SI joint compression negative right, positive left    SI joint distraction negative right, positive left    Thigh thrust test negative right, positive left    SREEKANTH test negative right, positive left                 Key points for the international standards for neurological classification of spinal cord injury (ISNCSCI) to light touch.     Dermatome R L                                      L2 2 2   L3 2 2   L4 2 2   L5 2 1   S1 2 2   S2 2 2       Motor Exam Lower Extremities    ? Myotome R L   Hip flexion L2 5 5   Knee extension L3 5 5   Ankle dorsiflexion L4 5 5-   Toe extension L5 4 4   Ankle plantarflexion S1 5 5         Reflexes  ?  R L                Patella  2+ 2+   Achilles   2+ 2+       Babinski sign negative bilaterally   Clonus of the ankle negative bilaterally       MEDICAL DECISION MAKING    Medical records review: see under HPI section.     DATA    Labs:   Lab Results   Component Value Date/Time    SODIUM 142 12/19/2022 08:40 AM    POTASSIUM 4.7 12/19/2022 08:40 AM    CHLORIDE 105 12/19/2022 08:40 AM    CO2 25 12/19/2022 08:40 AM    ANION 12.0 12/19/2022 08:40 AM    GLUCOSE 102 (H) 12/19/2022 08:40 AM    BUN 14 12/19/2022 08:40 AM    CREATININE 1.00 12/19/2022 08:40  "recovery should accelerate     Tachycardia: orthostatic vs. cardiac issues  - Some PVCs noted on tele but hemodynamically stable  - Recently admitted with severe septic shock, hence consider cardiomyopathy  - Consulted cardiology (Dr Galvez): Holter monitor and repeat echo essentially unremarkable     Constipation secondary to opioid use:  - Start senna-docusate BID  - Encouraged ambulation as tolerated.  - BM yesterday     Retrosternal chest pain : likely from LLOYD, improved  - Continue famotidine 20 mg BID  - Continue to monitor     Hypoxemia: likely from opioids/atelectasis  - Weaned to 1 LPM oxygen  - IVF at 50 ml/hr given patient on vancomycin, encourage ambulation as tolerated  - Ordered incentive spirometry     Ph+ Precursor B-Cell Acute Lymphoblastic Leukemia, in MRD Remission:     BRYANNA, AR Arm B, Delayed Intensification: COMPLETE   ** \"Interim Maintenance\" ON HOLD pending improvement (will likely be delayed by 12-14 days)  ** Continue imatinib 600 mg PO daily                     Chemotherapy-related Pancytopenia:  - Transfused pRBCs on Feb 16: hgb stable  - Transfuse for platelets less than 10,000/microliters or with symptoms  - CBC q Mon/Thu: ANC, plts increasing     At risk for deep vein thrombosis due to pegaspargase:  - Platelets >50k  - Will re-start apixaban  - Ordered SCD, encourage ambulation as tolerated     At Risk of Opportunistic Lung Infection:  - Bactrim DS PO BID Sat and Kalpana for PJP prophylaxis     Central Access:   - R Port-A-Cath in place      Research Participant:           [facility] - Source Data         Diagnosis: Ph+ Precursor B-Cell Acute Lymphoblastic Leukemia     Disease Status: EOI1A MRD NEGATIVE, EOI1B RD NEGATIVE, CNS3c, testicular negative, HSV1 IgG POSITIVE, CMV IgG NEGATIVE, VARICELLA IgG POSITIVE     Active Studies: [id number], [id number]                                                                                                      Inactive Studies: " AM    CALCIUM 10.0 12/19/2022 08:40 AM    ASTSGOT 27 12/19/2022 08:40 AM    ALTSGPT 37 12/19/2022 08:40 AM    TBILIRUBIN 0.6 12/19/2022 08:40 AM    ALBUMIN 4.4 12/19/2022 08:40 AM    TOTPROTEIN 7.5 12/19/2022 08:40 AM    GLOBULIN 3.1 12/19/2022 08:40 AM    AGRATIO 1.4 12/19/2022 08:40 AM   ]    No results found for: PROTHROMBTM, INR     Lab Results   Component Value Date/Time    WBC 5.6 04/05/2022 07:49 AM    RBC 5.32 04/05/2022 07:49 AM    HEMOGLOBIN 15.3 04/05/2022 07:49 AM    HEMATOCRIT 47.9 04/05/2022 07:49 AM    MCV 90.0 04/05/2022 07:49 AM    MCH 28.8 04/05/2022 07:49 AM    MCHC 31.9 (L) 04/05/2022 07:49 AM    MPV 9.0 04/05/2022 07:49 AM    NEUTSPOLYS 73.00 (H) 09/03/2019 12:46 PM    LYMPHOCYTES 12.20 (L) 09/03/2019 12:46 PM    MONOCYTES 9.60 09/03/2019 12:46 PM    EOSINOPHILS 1.70 09/03/2019 12:46 PM    BASOPHILS 0.90 09/03/2019 12:46 PM    HYPOCHROMIA 1+ 03/25/2011 09:07 AM        Lab Results   Component Value Date/Time    HBA1C 6.2 (H) 12/19/2022 08:40 AM        Imaging:   I personally reviewed following images, these are my reads  MRI lumbar spine 2/16/2023  Multiple anterior bridging osteophytes in the lower thoracic upper lumbar spine.  Significant facet arthropathy bilaterally at L4-5 and L5-S1.  Foraminal stenosis on the left at L4-5.        IMAGING radiology reads. I reviewed the following radiology reads                      Results for orders placed in visit on 02/16/23    MR-LUMBAR SPINE-W/O    Impression  1.  Degenerative disease in the lumbar spine as described above.  2.  There are combinations of facet joint arthropathy, prominent ligamentum flavum and small synovial cyst causing moderate effacement of the left lateral recess at the level of L4-5. The exiting left L5 nerve root might have been impinged at the lateral  recess.        Results for orders placed in visit on 02/16/23    MR-LUMBAR SPINE-W/O    Impression  1.  Degenerative disease in the lumbar spine as described above.  2.  There are  [id number]                                                                                                                                                Optional Studies: None             Protocol: International Phase 3 trial in [place] 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      Treatment Plan: [id number](OS), AR-Arm B, Delayed Intensification, Day 63     Height: 1.650 m      Weight: 64.2kg       BSA: 1.72 m²   (Delayed Intensification Day 29 1/17/2023)                                                                                                                                           Performance Status: Karnofsky 70, ECOG  2 (1/31/2023)     Treatment Plan Medications:  (100% adherent with imatinib)     Imatinib dose adjusted for weight at DI, Day 29 to Imatinib 600 mg PO daily in AM     Evaluations / Study Labs:  (2/12/2023) : None     Therapy Given: (2/12/2023):  Imatinib 600 mg PO QAM     Toxicities:  **Grade 3 febrile neutropenia on 2/7/2023 (ANC <1000/mm3 with a single temperature of >38.3 degrees C (101 degrees F) or a sustained temperature of >=38 degrees C (100.4 degrees F) for more than one hour)  ** Grade 3 diarrhea on 2/6/2023 (Increase of >=7 stools per day  over baseline; hospitalization indicated, limiting self care ADL): Resolved 2/7/2023  ** Grade 2 diarrhea on 2/7/2023 (Increase of 4 - 6 stools per day  over baseline; limiting instrumental ADL) Resolved 2/8/2023  ** Grade 3 vomiting on 2/6/2023 and 2/7/2023 (hospitalization indicated) Resolved 2/8/2023  ** Grade 2 Myositis (Moderate pain associated with weakness; pain limiting, instrumental ADL): Present on admission on 2/7/2023  ** Grade 3 Hypoxemia (Decreased oxygen saturation at rest (e.g., pulse oximeter  <88% or PaO2 <=55 mm Hg), started 2/10/2023  ** Grade 3 soft tissue infection (invasive intervention indicated)         Disposition:  combinations of facet joint arthropathy, prominent ligamentum flavum and small synovial cyst causing moderate effacement of the left lateral recess at the level of L4-5. The exiting left L5 nerve root might have been impinged at the lateral  recess.                                                             Results for orders placed during the hospital encounter of 05/29/04    DX-KNEE COMPLETE 4+    Impression  IMPRESSION:      NEGATIVE RIGHT KNEE SERIES.              READING DR:        MIGDALIA COATS MD  READING DATE:      May 29 2004  6:29PM  REPORT STATUS:     FINAL REPORT    TRANSCRIPTION CODE:         MOC  TRANSCRIPTION DATE:         May 29 2004  9:48PM    THIS DOCUMENT HAS BEEN ELECTRONICALLY SIGNED BY:  MAGAN RIOS MD,  PhD - May 30 2004  7:03AM    Results for orders placed in visit on 08/04/22    DX-LUMBAR SPINE-4+ VIEWS                        Diagnosis   Visit Diagnoses     ICD-10-CM   1. Lumbar radiculopathy  M54.16   2. Lumbar spondylosis  M47.816   3. SI (sacroiliac) joint dysfunction  M53.3   4. Chronic left-sided low back pain with left-sided sciatica  M54.42    G89.29   5. Obesity (BMI 30-39.9)  E66.9   6. Exercise counseling  Z71.82           ASSESSMENT AND PLAN:  Arias Pham 61 y.o. male      Arias was seen today for new patient.    Diagnoses and all orders for this visit:    Lumbar radiculopathy  -     Referral to Physical Medicine Rehab    Lumbar spondylosis    SI (sacroiliac) joint dysfunction    Chronic left-sided low back pain with left-sided sciatica    Obesity (BMI 30-39.9)    Exercise counseling        I believe the patient's pain is multifactorial but mostly second    Lower lumbar radiculopathy L4-5 L5-S1 which is consistent with the patient's pain and symptoms.  Also likely contributing facet mediated pain as well as sacroiliac joint dysfunction.  He has failed conservative treatments.      home exercise program: I provided the patient with a strengthening and stretching  Pending wean from IV pain meds; will plan to D/C on oral metronidazole    Discussed with nursing staff, Dr. Singh.  Total time today approx 40 minutes.    ANN MARIE Rodriguez MD  Pediatric Hematology / Oncology  [facility]  Cell. [phone]  Office. [phone]     with a home exercise program     Diagnostic workup: Procedure below for diagnostic and therapeutic purposes    Medications:   ibuprofen 600mg TID PRN pain during flares of pain    Interventional program:   I have ordered a left L4-5 and L5-S1 transforaminal epidural steroid injection    The risks benefits and alternatives to this procedure were discussed and the patient wishes to proceed with the procedure. Risks include but are not limited to damage to surrounding structures, infection, bleeding, worsening of pain which can be permanent, weakness which can be permanent. Benefits include pain relief, improved function. Alternatives includes not doing the procedure.          Follow-up: After the above diagnostic and therapeutic procedure          Please note that this dictation was created using voice recognition software. I have made every reasonable attempt to correct obvious errors but there may be errors of grammar and content that I may have overlooked prior to finalization of this note.      Maximus Hernández MD  Physical Medicine and Rehabilitation  Interventional Spine and Sports Physiatry  [facility]          Leesa Brewer M.D.

## 2023-02-27 NOTE — PROGRESS NOTES
Pt demonstrates ability to turn self in bed without assistance of staff. Patient understands importance in prevention of skin breakdown, ulcers, and potential infection. Hourly rounding in effect. RN skin check complete.   Devices in place include: R port, .  Skin assessed under devices: Yes.  Confirmed HAPI identified on the following date: N/A   Location of HAPI: N/A.  Wound Care RN following: No.  The following interventions are in place: Skin checked with each assessment.

## 2023-02-28 PROCEDURE — 700102 HCHG RX REV CODE 250 W/ 637 OVERRIDE(OP): Performed by: PEDIATRICS

## 2023-02-28 PROCEDURE — RXMED WILLOW AMBULATORY MEDICATION CHARGE: Performed by: PEDIATRICS

## 2023-02-28 PROCEDURE — 770003 HCHG ROOM/CARE - PEDIATRIC PRIVATE*

## 2023-02-28 PROCEDURE — A9270 NON-COVERED ITEM OR SERVICE: HCPCS | Performed by: PEDIATRICS

## 2023-02-28 PROCEDURE — 700101 HCHG RX REV CODE 250: Performed by: PEDIATRICS

## 2023-02-28 PROCEDURE — 700111 HCHG RX REV CODE 636 W/ 250 OVERRIDE (IP): Performed by: PEDIATRICS

## 2023-02-28 PROCEDURE — 99233 SBSQ HOSP IP/OBS HIGH 50: CPT | Performed by: PEDIATRICS

## 2023-02-28 PROCEDURE — 700105 HCHG RX REV CODE 258: Performed by: PEDIATRICS

## 2023-02-28 RX ORDER — METRONIDAZOLE 500 MG/1
500 TABLET ORAL EVERY 8 HOURS
Qty: 100 TABLET | Refills: 2 | Status: ACTIVE | OUTPATIENT
Start: 2023-02-28 | End: 2023-03-30

## 2023-02-28 RX ORDER — ONDANSETRON 2 MG/ML
8 INJECTION INTRAMUSCULAR; INTRAVENOUS EVERY 8 HOURS PRN
Status: CANCELLED | OUTPATIENT
Start: 2023-02-28

## 2023-02-28 RX ORDER — AMOXICILLIN 250 MG
1 CAPSULE ORAL DAILY
Qty: 30 TABLET | Refills: 0 | Status: ON HOLD | OUTPATIENT
Start: 2023-03-01 | End: 2023-04-15

## 2023-02-28 RX ORDER — OXYCODONE HYDROCHLORIDE 5 MG/1
5-10 TABLET ORAL EVERY 4 HOURS PRN
Qty: 60 TABLET | Refills: 0 | Status: SHIPPED | OUTPATIENT
Start: 2023-02-28 | End: 2023-03-16

## 2023-02-28 RX ORDER — DIPHENHYDRAMINE HYDROCHLORIDE 50 MG/ML
50 INJECTION INTRAMUSCULAR; INTRAVENOUS PRN
Status: CANCELLED | OUTPATIENT
Start: 2023-02-28

## 2023-02-28 RX ORDER — CHOLECALCIFEROL (VITAMIN D3) 125 MCG
5 CAPSULE ORAL NIGHTLY
Status: DISCONTINUED | OUTPATIENT
Start: 2023-02-28 | End: 2023-03-01 | Stop reason: HOSPADM

## 2023-02-28 RX ORDER — PROMETHAZINE HYDROCHLORIDE 6.25 MG/5ML
0.25 SYRUP ORAL EVERY 6 HOURS PRN
Status: CANCELLED | OUTPATIENT
Start: 2023-02-28

## 2023-02-28 RX ORDER — OXYCODONE HCL 10 MG/1
10 TABLET, FILM COATED, EXTENDED RELEASE ORAL EVERY 8 HOURS
Qty: 45 TABLET | Refills: 0 | Status: SHIPPED | OUTPATIENT
Start: 2023-02-28 | End: 2023-03-15

## 2023-02-28 RX ORDER — OXYCODONE HYDROCHLORIDE 5 MG/1
5-10 TABLET ORAL EVERY 4 HOURS PRN
Status: DISCONTINUED | OUTPATIENT
Start: 2023-02-28 | End: 2023-03-01 | Stop reason: HOSPADM

## 2023-02-28 RX ORDER — FAMOTIDINE 20 MG/1
20 TABLET, FILM COATED ORAL 2 TIMES DAILY
Qty: 60 TABLET | Refills: 4 | Status: ON HOLD | OUTPATIENT
Start: 2023-02-28 | End: 2023-04-15

## 2023-02-28 RX ORDER — METRONIDAZOLE 500 MG/1
500 TABLET ORAL EVERY 8 HOURS
Status: DISCONTINUED | OUTPATIENT
Start: 2023-02-28 | End: 2023-03-01 | Stop reason: HOSPADM

## 2023-02-28 RX ORDER — ONDANSETRON 8 MG/1
8 TABLET, ORALLY DISINTEGRATING ORAL EVERY 6 HOURS PRN
Qty: 10 TABLET | Refills: 0 | Status: SHIPPED | OUTPATIENT
Start: 2023-02-28

## 2023-02-28 RX ORDER — DIPHENHYDRAMINE HYDROCHLORIDE 50 MG/ML
50 INJECTION INTRAMUSCULAR; INTRAVENOUS ONCE
Status: CANCELLED | OUTPATIENT
Start: 2023-02-28 | End: 2023-02-28

## 2023-02-28 RX ADMIN — ACETAMINOPHEN 650 MG: 325 TABLET, FILM COATED ORAL at 16:19

## 2023-02-28 RX ADMIN — OXYCODONE HYDROCHLORIDE 10 MG: 10 TABLET, FILM COATED, EXTENDED RELEASE ORAL at 06:40

## 2023-02-28 RX ADMIN — FAMOTIDINE 20 MG: 20 TABLET ORAL at 08:32

## 2023-02-28 RX ADMIN — Medication 5 MG: at 21:53

## 2023-02-28 RX ADMIN — Medication 1000 UNITS: at 08:32

## 2023-02-28 RX ADMIN — DEXTROSE AND SODIUM CHLORIDE: 5; 450 INJECTION, SOLUTION INTRAVENOUS at 15:13

## 2023-02-28 RX ADMIN — METRONIDAZOLE 500 MG: 500 TABLET ORAL at 21:52

## 2023-02-28 RX ADMIN — FAMOTIDINE 20 MG: 20 TABLET ORAL at 18:32

## 2023-02-28 RX ADMIN — OXYCODONE HYDROCHLORIDE 5 MG: 5 TABLET ORAL at 16:19

## 2023-02-28 RX ADMIN — OXYCODONE HYDROCHLORIDE 10 MG: 10 TABLET, FILM COATED, EXTENDED RELEASE ORAL at 21:51

## 2023-02-28 RX ADMIN — CHLORHEXIDINE GLUCONATE 15 ML: 1.2 RINSE ORAL at 18:32

## 2023-02-28 RX ADMIN — ONDANSETRON 8 MG: 4 TABLET, ORALLY DISINTEGRATING ORAL at 09:22

## 2023-02-28 RX ADMIN — METRONIDAZOLE 500 MG: 5 INJECTION, SOLUTION INTRAVENOUS at 06:40

## 2023-02-28 RX ADMIN — OXYCODONE HYDROCHLORIDE 5 MG: 5 TABLET ORAL at 15:11

## 2023-02-28 RX ADMIN — OXYCODONE HYDROCHLORIDE 10 MG: 5 TABLET ORAL at 20:31

## 2023-02-28 RX ADMIN — METRONIDAZOLE 500 MG: 500 TABLET ORAL at 14:04

## 2023-02-28 RX ADMIN — OXYCODONE HYDROCHLORIDE 10 MG: 10 TABLET, FILM COATED, EXTENDED RELEASE ORAL at 14:04

## 2023-02-28 RX ADMIN — IMATINIB MESYLATE 200 MG: 100 TABLET, FILM COATED ORAL at 08:27

## 2023-02-28 RX ADMIN — DEXTROSE AND SODIUM CHLORIDE: 5; 450 INJECTION, SOLUTION INTRAVENOUS at 10:41

## 2023-02-28 RX ADMIN — CHLORHEXIDINE GLUCONATE 15 ML: 1.2 RINSE ORAL at 08:32

## 2023-02-28 RX ADMIN — IMATINIB MESYLATE 400 MG: 400 TABLET, FILM COATED ORAL at 08:25

## 2023-02-28 ASSESSMENT — PAIN DESCRIPTION - PAIN TYPE
TYPE: ACUTE PAIN

## 2023-02-28 NOTE — PROGRESS NOTES
Pediatric Hematology/Oncology  Daily Progress Note      Patient Name:  Tomas Jean-Baptiste  : 2000  MRN: [id number]    Location of Service:  [facility]  Date of Service: 2023  Time: 09:01 PM    Hospital Day: 21    Protocol / Treatment Plan: [place] Chromosome Precursor B-Cell Acute Lymphoblastic Leukemia, ON STUDY [id number], Start of Capizzi MTX Interim Maintenance SR, Day 1    SUBJECTIVE:     No acute events overnight.  Afebrile Tmax 98.6 °F. Still complaining of L shoulder pain but OK with decreasing the basal rate and adding OxyContin. Moving L shoulder a little bit.  No headaches and no shortness of breath.  Not complaining of any changes in vision or neurologic status changes.  No complaints of any nausea, vomiting, diarrhea or constipation.  Stooling regularly.   Still has not been able to eat any solid food and is continuing to take protein shakes.  No shortness of breath, difficulty breathing, cough or chest pain.  Did not require oxygen overnight and maintaining oxygen saturation.  No new rashes or skin changes.  Incision site of left shoulder is clean, dry and intact.  No other concerns or complaints at this time.    Review of Systems:     Constitutional: Afebrile, decreased energy.  Decreased appetite and oral intake.  HENT: Negative.  Eyes: Negative for visual changes.  Respiratory: Negative for shortness of breath.  Cardiovascular: Negative.  Gastrointestinal: Negative for nausea, vomiting, abdominal pain, diarrhea, constipation. Drinking only protein shakes.  Genitourinary: Negative.  Musculoskeletal: Left shoulder pain.  Skin: Negative for rash or skin infection.  Neurological: Negative for numbness, tingling, sensory changes, weakness or headaches.    Endo/Heme/Allergies: No bruising/bleeding easily.    Psychiatric/Behavioral: Flat mood.     OBJECTIVE:     Max Temp: Temp (24hrs), Av.7 °C (98 °F), Min:36.4 °C (97.5 °F), Max:37 °C (98.6  "°F)    Vitals: /66   Pulse 90   Temp 37.7 °C (99.9 °F) (Temporal)   Resp 16   Ht 1.651 m (5' 5\")   Wt 64.9 kg (143 lb 1.3 oz)   SpO2 91%   BMI 23.81 kg/m²     I/O:   Intake/Output Summary (Last 24 hours) at 2/27/2023 2101  Last data filed at 2/27/2023 1902  Gross per 24 hour   Intake 3583.08 ml   Output 3345 ml   Net 238.08 ml       Labs:    Most Recent Hematology Labs:    Recent Labs     02/27/23  0622   WBC 2.9*   RBC 2.98*   HEMOGLOBIN 8.5*   HEMATOCRIT 26.2*   MCV 87.9   MCH 28.5   RDW 52.5*   PLATELETCT 124*   MPV 10.1   NEUTSPOLYS 73.10*   LYMPHOCYTES 12.30*   MONOCYTES 13.30   EOSINOPHILS 0.00   BASOPHILS 0.30       Most Recent Metabolic Panel:     Latest Reference Range & Units 02/27/23 13:37   Sodium 135 - 145 mmol/L 136   Potassium 3.6 - 5.5 mmol/L 3.8   Chloride 96 - 112 mmol/L 104   Co2 20 - 33 mmol/L 27   Anion Gap 7.0 - 16.0  5.0 (L)   Glucose 65 - 99 mg/dL 108 (H)   Bun 8 - 22 mg/dL 2 (L)   Creatinine 0.50 - 1.40 mg/dL 0.33 (L)   GFR (CKD-EPI) >60 mL/min/1.73 m 2 168   Calcium 8.5 - 10.5 mg/dL 7.6 (L)   Correct Calcium 8.5 - 10.5 mg/dL 9.0   AST(SGOT) 12 - 45 U/L 19   ALT(SGPT) 2 - 50 U/L 11   Alkaline Phosphatase 30 - 99 U/L 110 (H)   Total Bilirubin 0.1 - 1.5 mg/dL 0.3   Direct Bilirubin 0.1 - 0.5 mg/dL <0.2   Indirect Bilirubin 0.0 - 1.0 mg/dL see below   Albumin 3.2 - 4.9 g/dL 2.3 (L)   Total Protein 6.0 - 8.2 g/dL 4.2 (L)   Globulin 1.9 - 3.5 g/dL 1.9   A-G Ratio g/dL 1.2     Physical Exam:    Constitutional: Tired. Not in distress.  HENT: Normocephalic and atraumatic. Alopecia.  No rhinorrhea. Oropharynx is clear and moist.   Eyes: Conjunctivae are normal. EOMI. nonicteric.  Neck: Normal range of motion of neck, no adenopathy.    Cardiovascular: Normal rate, regular rhythm.  No murmur. DP/radial pulses 2+, cap refill < 2 sec.  Pulmonary/Chest: Effort normall. No respiratory distress. Symmetric expansion.  No crackles or wheezes.  Abdomen: Soft. Bowel sounds are normal. No distension " and no mass. There is no hepatosplenomegaly.    Genitourinary:  Deferred  Musculoskeletal: Slightly improved range of motion of left shoulder.  Still with poor strength in shoulder.    Neurological: Alert and oriented to person and place.  Gait not assessed.    Skin: Skin is warm, dry and pink.  No rash or evidence of skin infection.  Very well-healing left shoulder incision without any signs infection  Psychiatric: Mood still flat.    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    Will start Interim Maintenance with Capizzi Methotrexate today . Labs and physical exam OK to proceed.      1) Soft Tissue Infection / Joint Infection with Bacteroides fragilis:  - Edema and pain continue to improve  - X-ray L shoulder 2/8/2023: not concerning  - MRI shoulder 2/10/2023: showing diffuse myositis and some intermuscular edema/fluid  - No evidence of DVT  - Was initially on cefepime for F&N, added IV Vancomycin on 2/10/2023, discontinued both cefepime and vancomycin on 2/20/2023 after start of IV Flagyl (below)  - S/P open exploration/drainage 2/17/2023  - Culture: B.fragilis (both deep and superficial cultures)   - ID Consultation with Dr. Singh - recommendation for switch to metronidazole for 4+ weeks  - Flagyl 500 mg IV Q8 hours (Day 9)     ** Dr. Faye spoke again with Dr. Singh - will attempt total of 6 weeks of Flagyl - if neuropathies complicate care can give 4+ weeks of therapy   - IgG adequate at 525 mg/dL     2) Left Shoulder Pain:  - Pain overall improving  - Current PCA settings: Dilaudid basal rate 0.4 mg/hr, bolus dose to 0.4 mg,   - Will start  Oxycontin 10 mg every 8 hrs and decrease basal to 0.2 mg/hr     3) Febrile Neutropenia in Immunocompromised Host: (RESOLVED)  - Previous history of gram negative septic shock  - Blood culture from port 2/7/2023: NGTD  - Repeat blood culture from port 2/9/2023: NGTD  - IV Cefepime every 8 hrs (Discontinued 2/20/2023)  - IV Vancomycin started on 2/10/2023 (Discontinued 2/20/2023)     4) Tachycardia:   - Some PVCs noted on tele but hemodynamically stable  - Recently admitted with severe septic shock, hence consider cardiomyopathy  - Consulted cardiology (Dr Galvez): Holter monitor and repeat ECHO essentially unremarkable  - No additional interventions  - As orthostasis and deconditioning are consideration  - Working with PT/OT     5) At Risk of Opioid Induced Constipation:   - Senna-docusate Daily  - Encouraged ambulation as tolerated.  - Stooling regularly     6) Retrosternal Chest Pain (RESOLVED):  - Likely GERD  - Continue famotidine 20 mg BID  - Continue to monitor     7) Poor Appetite / Nausea:  - Reports nausea specifically when eating solids  - Encouraged slowly advancing diet     8) Hypoxemia (IMPROVED):   - Did not require oxygen overnight              - Continue Incentive Spirometry                       - Ambulation as tolerated              - Up and to chair as tolerated     9) Ph+ Precursor B-Cell Acute Lymphoblastic Leukemia, in MRD Remission:     [id number], Arm B, Annai MTX Interim Maintenance SR, Day 1    ** LP with IT Mtx 12 mg x 1 on Day 1 (COMPLETE), See separate procedure note  ** Vincristine 2 mg IV x 1 on Day 1  ** Methotrexate  mg/m2/dose= 173 mg IV x 1 on Day 1  ** Pegaspargase IV 2500 IU/m2= 4325.25 Units IV x 1 on Day 2 (might move it to Day 3 pending discharge plan)     ** Continue imatinib 600 mg PO daily                     10) Chemotherapy Related Pancytopenia:  - Transfused pRBCs last on 2/16/2023 and again 2/22/2023  - Transfuse for hemoglobin less than 7.5 gm/dL or  symptomatic  - Transfuse for platelets less than 10,000/microliters or symptomatic  - CBC every Monday and Thursday  - No indication for transfusion today     11) At Risk for DVT Secondary to PEG Asparaginase:   - Platelets >50k  - Currently on apixaban 2.5 mg PO BID which was HELD for LP. Will discontinue and restart as outpatient given that patient is due for another dose of pegaspargase on Day 2 of IM-II.  - Still at risk of thromboembolism secondary to inadequate ambulation, S ordered CDs              12) At Risk for Nausea and Vomiting Secondary to Chemotherapy           -  Zofran prior to IT chemotherapy           - Zofran PO 8 mg ODT and ativan IV available as PRN                13) At Risk of Opportunistic Lung Infection:  - Bactrim DS PO BID Sat and Sun for PJP prophylaxis  - Chlorhexidine mouthwash 4 times daily     14) Central Access:   - R Port-A-Cath in place      Research Participant:           [facility] - Source Data         Diagnosis: Ph+ Precursor B-Cell Acute Lymphoblastic Leukemia     Disease Status: EOI1A MRD NEGATIVE, EOI1B RD NEGATIVE, CNS3c, testicular negative, HSV1 IgG POSITIVE, CMV IgG NEGATIVE, VARICELLA IgG POSITIVE     Active Studies: [id number], [id number]                                                                                                      Inactive Studies: [id number]                                                                                                                                                Optional Studies: None             Protocol: International Phase 3 trial in [place] 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      Treatment Plan: [id number](OS), AR-Arm B, Capizzi MTX Interim Maintenance SR, Day 1     Height: 1.650 m      Weight: 64.9 kg       BSA: 1.73 m²   ( Capizzi MTX Interim Maintenance Day 1 2/27/2023)                                                                                                                                            Performance Status: Karnofsky 60, ECOG  3 (2/27/2023)     Treatment Plan Medications:  (100% adherent with imatinib)     Imatinib dose adjusted for weight at DI, Day 29 to Imatinib 600 mg PO daily in AM (The imatinib dose is recalculated based on BSA every 12 weeks)     Evaluations / Study Labs:  (2/27/2023)     A. CBC with diff/platelets.   B. Bilirubin, AST, ALT, and Creatinine.   C. CSF cell count, cytospin    - WBC- 2900/microliters, hemoglobin- 8.5 gm/dL and platelets- 124,000/microliters  - ANC: 2140/microliters, ALC- 360/microliters  - AST: 19 U/L, ALT: 11 U/L, bilirubin: total is 0.3 mg/dL and direct is <0.2 mg/dL  - Serum Creatinine: 0.33 mg/dL        Therapy Given: (2/27/2023)     Imatinib 600 mg PO QAM  Intrathecal Methotrexate 12 mg  Vincristine 2 mg IV x 1  IV Methotrexate 100 mg/m2/dose= 173 mg IV x 1     Toxicities:     **Grade 3 febrile neutropenia on 2/7/2023 (ANC <1000/mm3 with a single temperature of >38.3 degrees C (101 degrees F) or a sustained temperature of >=38 degrees C (100.4 degrees F) for more than one hour)  ** Grade 3 diarrhea on 2/6/2023 (Increase of >=7 stools per day  over baseline; hospitalization indicated, limiting self care ADL): Resolved 2/7/2023  ** Grade 2 diarrhea on 2/7/2023 (Increase of 4 - 6 stools per day  over baseline; limiting instrumental ADL) Resolved 2/8/2023  ** Grade 3 vomiting on 2/6/2023 and 2/7/2023 (hospitalization indicated) Resolved 2/8/2023  ** Grade 2 Myositis (Moderate pain associated with weakness; pain limiting, instrumental ADL): Present on admission on 2/7/2023  ** Grade 3 Hypoxemia (Decreased oxygen saturation at rest (e.g., pulse oximeter  <88% or PaO2 <=55 mm Hg), started 2/10/2023 (RESOLVED 2/24/2023)  ** Grade 3 soft tissue infection (invasive intervention indicated - TREATING)          Disposition: Inpatient for treatment of left should  pain. Started Capizzi Mtx today. Likely discharge in a day or 2.          Alyce Duenas MD  Pediatric Hematology / Oncology  [facility]  Cell.  [phone]  [facility]. [phone]

## 2023-02-28 NOTE — PROGRESS NOTES
Pt demonstrates ability to turn self in bed without assistance of staff. Patient understands importance in prevention of skin breakdown, ulcers, and potential infection. Hourly rounding in effect. RN skin check complete.   Devices in place include: Right chest port.  Skin assessed under devices: Yes.  Confirmed HAPI identified on the following date: NA   Location of HAPI: NA.  Wound Care RN following: No.  The following interventions are in place: Devices repositioned and cleaned as needed. Patient repositions in bed and ambulates independently.

## 2023-02-28 NOTE — OP REPORT
Pediatric Oncology Lumbar Puncture  Procedure Note      Patient Name:  Tomas Jean-Baptiste  : 2000   MRN: [id number]    Service Location:  [facility] (Room 421)  Date of Service: 2023  Time: 10:38 AM    Procedure Performed By: Alyce Duenas M.D.    Pre-procedural Diagnosis:  Acute B-Lymphoblastic Leukemia (C91.01) having achieved remission  Post-procedural Diagnosis: Acute B-Lymphoblastic Leukemia (C91.01) having achieved remission    Procedure:  Lumbar Puncture with administration of intrathecal chemotherapy    Sedation:  Versed 2 mg IV x 1, EMLA cream    Intrathecal Chemotherapy:  Yes    Chemotherapy Administered:  Methotrexate 12 mg IT (in 6 mL NS)    Needle Size:  22 gauge, 3.5 in  Site: L3-L4, L4-L5  Number of Attempts: 3  Fluid:  3 ml bloody spinal fluid obtained  Labs: Cell count, cytology    Complications:  None    Procedure Note:    Tomas Jean-Baptiste is a 22 y.o. 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  He is scheduled for lumbar puncture with intrathecal chemotherapy.  Prior to the procedure, the risks and benefits were discussed with the patient.  Consent for the procedure was signed by patient and placed in the patient's chart.  All pertinent labs and history were reviewed and a complete History and Physical Examination were performed and placed in the medical record.  All necessary safety equipment per ASA guidelines were available.  A time-out was performed and the patient identified by name,  and medical record number. A back board was placed. Gowns, gloves and mask were worn during the entire procedure.  Tomas Roy was prepped and draped in the usual sterile fashion with povoiodine.  He was positioned in the left decubitus position and all landmarks including superior posterior iliac crest, umbilicus and vertebral bodies were identified by palpation.  A 3.5 in, 22 guague spinal needle was  introduced into the L4-L5 spinal interspace.  Bloody CSF was obtained. Hence, the procedure was repeated along L3-L4 space. Once again 3 ml of bloody CSF was obtained which was then sent for cell count and cytology.  Methotrexate 12 mg in 6 mL of NS was verified with the nurse bedside and then administered into the spinal fluid. Tomas Roy tolerated the procedure without complication or bleeding.  He was instructed that he lie flat for 1 hour following the procedure.    Results:    PENDING    Alyce Duenas MD  Pediatric Hematology / Oncology  [facility]  Cell.  [phone]

## 2023-03-01 ENCOUNTER — HOSPITAL ENCOUNTER (OUTPATIENT)
Dept: INFUSION CENTER | Facility: MEDICAL CENTER | Age: 23
End: 2023-03-01
Attending: PEDIATRICS
Payer: COMMERCIAL

## 2023-03-01 ENCOUNTER — PHARMACY VISIT (OUTPATIENT)
Dept: PHARMACY | Facility: MEDICAL CENTER | Age: 23
End: 2023-03-01
Payer: COMMERCIAL

## 2023-03-01 VITALS
HEIGHT: 65 IN | RESPIRATION RATE: 18 BRPM | SYSTOLIC BLOOD PRESSURE: 104 MMHG | TEMPERATURE: 99 F | OXYGEN SATURATION: 99 % | BODY MASS INDEX: 22 KG/M2 | DIASTOLIC BLOOD PRESSURE: 75 MMHG | WEIGHT: 132.06 LBS | HEART RATE: 89 BPM

## 2023-03-01 VITALS
OXYGEN SATURATION: 92 % | SYSTOLIC BLOOD PRESSURE: 104 MMHG | DIASTOLIC BLOOD PRESSURE: 61 MMHG | HEART RATE: 72 BPM | WEIGHT: 143.08 LBS | RESPIRATION RATE: 18 BRPM | HEIGHT: 65 IN | BODY MASS INDEX: 23.84 KG/M2 | TEMPERATURE: 98.2 F

## 2023-03-01 DIAGNOSIS — C91.01 ACUTE LYMPHOBLASTIC LEUKEMIA (ALL) IN REMISSION (HCC): ICD-10-CM

## 2023-03-01 DIAGNOSIS — C91.Z0 B LYMPHOBLASTIC LEUKEMIA WITH T(9;22)(Q34;Q11.2);BCR-ABL1 (HCC): ICD-10-CM

## 2023-03-01 DIAGNOSIS — Z51.11 ENCOUNTER FOR CHEMOTHERAPY MANAGEMENT: ICD-10-CM

## 2023-03-01 LAB
BASOPHILS # BLD AUTO: 0 % (ref 0–1.8)
BASOPHILS # BLD: 0 K/UL (ref 0–0.12)
EOSINOPHIL # BLD AUTO: 0 K/UL (ref 0–0.51)
EOSINOPHIL NFR BLD: 0 % (ref 0–6.9)
ERYTHROCYTE [DISTWIDTH] IN BLOOD BY AUTOMATED COUNT: 50.9 FL (ref 35.9–50)
GLUCOSE BLD STRIP.AUTO-MCNC: 95 MG/DL (ref 65–99)
GLUCOSE SERPL-MCNC: 86 MG/DL (ref 65–99)
HCT VFR BLD AUTO: 26 % (ref 42–52)
HGB BLD-MCNC: 8.3 G/DL (ref 14–18)
IMM GRANULOCYTES # BLD AUTO: 0.02 K/UL (ref 0–0.11)
IMM GRANULOCYTES NFR BLD AUTO: 0.9 % (ref 0–0.9)
LYMPHOCYTES # BLD AUTO: 0.26 K/UL (ref 1–4.8)
LYMPHOCYTES NFR BLD: 11.9 % (ref 22–41)
MCH RBC QN AUTO: 27.9 PG (ref 27–33)
MCHC RBC AUTO-ENTMCNC: 31.9 G/DL (ref 33.7–35.3)
MCV RBC AUTO: 87.5 FL (ref 81.4–97.8)
MONOCYTES # BLD AUTO: 0.29 K/UL (ref 0–0.85)
MONOCYTES NFR BLD AUTO: 13.2 % (ref 0–13.4)
NEUTROPHILS # BLD AUTO: 1.62 K/UL (ref 1.82–7.42)
NEUTROPHILS NFR BLD: 74 % (ref 44–72)
NRBC # BLD AUTO: 0 K/UL
NRBC BLD-RTO: 0 /100 WBC
PLATELET # BLD AUTO: 98 K/UL (ref 164–446)
PMV BLD AUTO: 11.5 FL (ref 9–12.9)
RBC # BLD AUTO: 2.97 M/UL (ref 4.7–6.1)
WBC # BLD AUTO: 2.2 K/UL (ref 4.8–10.8)

## 2023-03-01 PROCEDURE — 85025 COMPLETE CBC W/AUTO DIFF WBC: CPT

## 2023-03-01 PROCEDURE — 700102 HCHG RX REV CODE 250 W/ 637 OVERRIDE(OP): Performed by: PEDIATRICS

## 2023-03-01 PROCEDURE — 96415 CHEMO IV INFUSION ADDL HR: CPT

## 2023-03-01 PROCEDURE — 306780 HCHG STAT FOR TRANSFUSION PER CASE

## 2023-03-01 PROCEDURE — A9270 NON-COVERED ITEM OR SERVICE: HCPCS | Performed by: PEDIATRICS

## 2023-03-01 PROCEDURE — 96413 CHEMO IV INFUSION 1 HR: CPT

## 2023-03-01 PROCEDURE — 82962 GLUCOSE BLOOD TEST: CPT

## 2023-03-01 PROCEDURE — RXMED WILLOW AMBULATORY MEDICATION CHARGE: Performed by: PEDIATRICS

## 2023-03-01 PROCEDURE — 96375 TX/PRO/DX INJ NEW DRUG ADDON: CPT

## 2023-03-01 PROCEDURE — 99238 HOSP IP/OBS DSCHRG MGMT 30/<: CPT | Performed by: PEDIATRICS

## 2023-03-01 PROCEDURE — 700111 HCHG RX REV CODE 636 W/ 250 OVERRIDE (IP): Performed by: PEDIATRICS

## 2023-03-01 PROCEDURE — 82947 ASSAY GLUCOSE BLOOD QUANT: CPT

## 2023-03-01 PROCEDURE — 700105 HCHG RX REV CODE 258: Performed by: PEDIATRICS

## 2023-03-01 RX ORDER — MORPHINE SULFATE 15 MG/1
15 TABLET, FILM COATED, EXTENDED RELEASE ORAL EVERY 8 HOURS
Qty: 90 TABLET | Refills: 0 | Status: SHIPPED | OUTPATIENT
Start: 2023-03-01 | End: 2023-03-20

## 2023-03-01 RX ORDER — 0.9 % SODIUM CHLORIDE 0.9 %
20 VIAL (ML) INJECTION PRN
Status: CANCELLED | OUTPATIENT
Start: 2023-03-01

## 2023-03-01 RX ORDER — DIPHENHYDRAMINE HYDROCHLORIDE 50 MG/ML
50 INJECTION INTRAMUSCULAR; INTRAVENOUS ONCE
Status: COMPLETED | OUTPATIENT
Start: 2023-03-01 | End: 2023-03-01

## 2023-03-01 RX ORDER — EPINEPHRINE 1 MG/ML(1)
0.5 AMPUL (ML) INJECTION PRN
Status: DISCONTINUED | OUTPATIENT
Start: 2023-03-01 | End: 2023-03-02 | Stop reason: HOSPADM

## 2023-03-01 RX ORDER — HEPARIN SODIUM,PORCINE 10 UNIT/ML
30 VIAL (ML) INTRAVENOUS PRN
Status: CANCELLED | OUTPATIENT
Start: 2023-03-01

## 2023-03-01 RX ORDER — HEPARIN SODIUM (PORCINE) LOCK FLUSH IV SOLN 100 UNIT/ML 100 UNIT/ML
500 SOLUTION INTRAVENOUS PRN
Status: CANCELLED | OUTPATIENT
Start: 2023-03-01

## 2023-03-01 RX ORDER — HEPARIN SODIUM (PORCINE) LOCK FLUSH IV SOLN 100 UNIT/ML 100 UNIT/ML
500 SOLUTION INTRAVENOUS PRN
Status: DISCONTINUED | OUTPATIENT
Start: 2023-03-01 | End: 2023-03-02 | Stop reason: HOSPADM

## 2023-03-01 RX ORDER — DIPHENHYDRAMINE HYDROCHLORIDE 50 MG/ML
50 INJECTION INTRAMUSCULAR; INTRAVENOUS PRN
Status: DISCONTINUED | OUTPATIENT
Start: 2023-03-01 | End: 2023-03-02 | Stop reason: HOSPADM

## 2023-03-01 RX ADMIN — PEGASPARGASE 4325.25 UNITS: 750 INJECTION, SOLUTION INTRAMUSCULAR; INTRAVENOUS at 12:10

## 2023-03-01 RX ADMIN — METRONIDAZOLE 500 MG: 500 TABLET ORAL at 06:24

## 2023-03-01 RX ADMIN — OXYCODONE HYDROCHLORIDE 10 MG: 5 TABLET ORAL at 10:30

## 2023-03-01 RX ADMIN — Medication 1000 UNITS: at 06:24

## 2023-03-01 RX ADMIN — ONDANSETRON 8 MG: 4 TABLET, ORALLY DISINTEGRATING ORAL at 09:24

## 2023-03-01 RX ADMIN — HEPARIN 500 UNITS: 100 SYRINGE at 16:03

## 2023-03-01 RX ADMIN — OXYCODONE HYDROCHLORIDE 10 MG: 5 TABLET ORAL at 06:28

## 2023-03-01 RX ADMIN — OXYCODONE HYDROCHLORIDE 10 MG: 10 TABLET, FILM COATED, EXTENDED RELEASE ORAL at 06:24

## 2023-03-01 RX ADMIN — ACETAMINOPHEN 650 MG: 325 TABLET, FILM COATED ORAL at 08:53

## 2023-03-01 RX ADMIN — OXYCODONE HYDROCHLORIDE 10 MG: 5 TABLET ORAL at 01:18

## 2023-03-01 RX ADMIN — FAMOTIDINE 20 MG: 20 TABLET ORAL at 06:24

## 2023-03-01 RX ADMIN — DIPHENHYDRAMINE HYDROCHLORIDE 50 MG: 50 INJECTION INTRAMUSCULAR; INTRAVENOUS at 11:39

## 2023-03-01 RX ADMIN — IMATINIB MESYLATE 400 MG: 400 TABLET, FILM COATED ORAL at 06:23

## 2023-03-01 RX ADMIN — IMATINIB MESYLATE 200 MG: 100 TABLET, FILM COATED ORAL at 06:24

## 2023-03-01 RX ADMIN — FAMOTIDINE 16.2 MG: 10 INJECTION INTRAVENOUS at 11:39

## 2023-03-01 ASSESSMENT — PAIN DESCRIPTION - PAIN TYPE
TYPE: ACUTE PAIN

## 2023-03-01 ASSESSMENT — FIBROSIS 4 INDEX: FIB4 SCORE: 1.227581902923751892

## 2023-03-01 NOTE — PROGRESS NOTES
Pt to [facility] for lab draw, doctors office visit, and chemotherapy administration.      Afebrile.  VSS.  Awake and alert in no acute distress.      Port remains accessed from inpatient stay. Brisk blood return noted.     Glucose 86.    Ok to proceed per Dr. Duenas.     Chemotherapy dosage calculated independently by Mary Saravia RN and Huong Foster RN and compared to road map for protocol [id number] (ON STUDY).  Calculations within 10% of written order.  Lab results reviewed.      Premedications and chemo given as ordered, see MAR.  Blood return verified prior to, and after chemotherapy infusion.  See Chemotherapy flowsheet.  PT tolerated well.    Report to MONTSERRAT Cho for one hour post infusion monitoring.

## 2023-03-01 NOTE — PROGRESS NOTES
"Pharmacy Chemotherapy Calculations Note:    Dx: B-ALL  Cycle:  Interim Maintenance I \"Day 2\" (delayed 1 day for hospitalization)   Previous treatment: IM Day 1 on 2/27/23     Protocol: Interim Maintenance per Arm B of [id number] [facility] ID number: [id number]            /72   Pulse (!) 116   Temp 36.5 °C (97.7 °F) (Temporal)   Resp 20   Ht 1.652 m (5' 5.04\")   Wt 59.9 kg (132 lb 0.9 oz)   SpO2 96%   BMI 21.95 kg/m²  Body surface area is 1.66 meters squared.    Labs from 3/1/23 reviewed - all within treatment parameters.       Pegaspargase (Oncaspar) 2500 units/m² x 1.65 m² = 4125 units   <10% difference, okay to treat with final dose = 4325.25 units IV      Judy Bryant, PharmD, BCOP            "

## 2023-03-01 NOTE — PROGRESS NOTES
Received report from Mary Saravia RN. Pt remains on 1hr post- monitoring after chemotherapy. Pt tolerated well. No adverse reactions noted. Port flushed with heparin and de-accessed. Pt home with mother via wheelchair in stable condition. Pt to return 3/9/23 for chemotherapy.

## 2023-03-01 NOTE — PROGRESS NOTES
Patient reported increasing pain unrelieved by pain medication given. Called Dr. Duenas at 1615 for clarification of medication orders listed. MD confirmed to give another dose of Oxycodone 5mg PRN and to reassess for worsening pain after administration. Patient also given heat pack and repositioned to help with pain.

## 2023-03-01 NOTE — PROGRESS NOTES
Pt discharged to home. Discharge instructions and follow up appointments reviewed. All questions answered.

## 2023-03-01 NOTE — PROGRESS NOTES
"Pharmacy chemotherapy verification    Patient Name: Tomas Jean-Baptiste   Dx: pH+ B-Cell ALL           Protocol: Capizzi MTX IM - Day 2 (On Study Arm B, ID number: [id number])     *Dosing Reference*      Allergies:  Amoxicillin     /72   Pulse (!) 116   Temp 36.5 °C (97.7 °F) (Temporal)   Resp 20   Ht 1.652 m (5' 5.04\")   Wt 59.9 kg (132 lb 0.9 oz)   SpO2 96%   BMI 21.95 kg/m²  Body surface area is 1.66 meters squared.    All lab results 3/1/23 within treatment parameters.      Drug Order   (Drug name, dose, route, IV Fluid & volume, frequency, number of doses) Cycle: IM D2      Previous treatment: IM D1 2/27/23   Medication = methotrexate  Base Dose= 12mg fixed dose  Calc Dose: n/a  Final Dose = 12mg  Route = INTRATHECAL  Fluid & Volume = PF NS 6mL  Admin Duration = IT per MD   Day 1 and 31       No calculation required  ok to treat with final dose   Medication = vincristine  Base Dose= 1.5mg/m2  Calc Dose: Base Dose x 1.73m2 = >2mg  Final Dose = 2mg  Route = IV  Fluid & Volume = NS 25 mL  Admin Duration = Over 5-10min   Days 1,11,21,31,and 41       <10% difference, ok to treat with max final dose   Medication = methotrexate  Base Dose= 100mg/m2  Calc Dose:Base Dose x 1.73m2 = 173mg  Final Dose = 173mg  Route = IV  Fluid & Volume = NS 50 mL  Admin Duration = Over 15min   Days 1,11,21,31,and 41        <10% difference, ok to treat with final dose   Medication = pegaspargase  Base Dose= 2500 units/m2  Calc Dose: Base Dose x 1.66 m2 = 4150 units  Final Dose = 4323.25 units  Route = IV  Fluid & Volume =  mL  Admin Duration = Over 2 hours   Days 2 and 22       <10% difference, ok to treat with final dose   By my signature below, I confirm this process was performed independently with the BSA and all final chemotherapy dosing calculations congruent. I have reviewed the above chemotherapy order and that my calculation of the final dose and BSA (when applicable) corroborate those calculations of the "  pharmacist. Discrepancies of 10% or greater in the written dose have been addressed and documented within the EPIC Progress notes.    Cherrie Decker, PharmD, BCOP

## 2023-03-01 NOTE — DISCHARGE SUMMARY
Pediatric Oncology Patient  Hospital Discharge Summary      ADMISSION DATE:  12/27/2022    SERVICE LOCATION: [facility]    DISCHARGE DATE:  1/14/2023    LENGTH OF STAY: 18    PRIMARY CARE PHYSICIAN:  Margarito Arvizu M.D.    ADMISSION CHIEF COMPLAINT:   Chief Complaint   Patient presents with    N/V     X1 week.     Syncope     While in the bathroom today, pt had a syncopal episode. Pt arrives with blood in his teeth.     Weakness     Increase in weakness x1 week.     Tachycardia     HR in the 180s for EMS.      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 coli and Klebsiella Sepsis/Septic Shock in an Immunocompromised Host  2) Ph+ Precursor B-Acute Lymphoblastic Leukemia  3) Chemotherapy and Sepsis Related Pancytopenia  4) At Risk for Opportunistic Lung Infection  5) Leg Pain  6) Left Leg Hematoma    CONSULTATIONS:  1) Intensivist  2) Infectious Disease  3) Physical therapy/Occupational Therapy  4) Interventional radiology    PROCEDURES:  1) Interventional Radiology Drainage/aspiration of left leg mass  2)     BLOOD PRODUCTS/TRANSFUSIONS:  Multiple units of PRBC, platelets, FFP and cryo in resuscitation    HISTORY OF PRESENT ILLNESS:    Patient was last seen in clinic on 12/20/2022 for Day 15 delayed intensification phase of therapy with Vincristine, Doxorubicin (Zinecard for cardioprotection). He was also started on PO dexamethasone pulse for 7 days which he finished yesterday on 12/26/2022. He was taking famotidine for GI prophylaxis while taking dexamethasone. He also continued taking his imatinib. He received pegaspargase on Day 4 and given risk for DVT with pegaspargase, he was started on apixaban 5 mg p.o. twice daily 2 weeks before admission which he was taking  at home up until presentation.     Since starting dexamethasone, patient has had intermittent nausea and vomiting. 2 days before his presentation to ER, patient had coffee ground emesis. He also complained of ongoing weakness and abdominal pain . JULY attributed all these symptoms to the side effects of dexamethasone. On the day of presentation to ER, patient was having palpitations, felt very light headed and dizzy. He had a syncopal episode and ended up hitting his face/mouth with some bleeding noted in his mouth/teeth. He felt very very sick and ended up calling Dr. Faye who then recommended he come to the [facility]. Patient then called EMS who then transported him to [facility] ER immediately. No reports of fever, runny nose or cough prior to the event. No reports of sick contacts. At the time of presentation to ER patient was complaining of calf pain and extreme weakness.      Per EMS the patient was hypotensive and tachycardic with a starting pressure of 54/31 mm Hg and heart rate of 170s-180s.      Per ER Note:     1:22 PM Patient seen and examined at bedside. Discussed plan of care, including labs and imaging. Patient agrees to the plan of care. The patient will be resuscitated with 1L NS IV for sepsis.     1:38 PM Patient was treated with adenosine. His heart rate from improved from  160-140 to 120-130 bpm.     1:43 PM Patients heart rate returned to 160 bpm. Antibiotics and levophed started at this time.     1:55 PM Accompanied patient to CT. No obvious findings noted on the apteitns abdominal CT.     2:00 PM oncology at bedside. Recommended a stress dose of steroids.      2:07 PM Patients blood pressure is improved to 128/48 after levophed. He will be treated with fentanyl and zofran.     2:20 PM Obtaining bedside ISTAT to check patients electrolytes and blood glucose.     2:30 PM Spoke to Dr. Ybarra who will determine if they have a bed in the Peds ICU for the patient.     2:35 PM Patient has a low  platelet count and will receive a blood transfusion.      BLOOD CONSENT  I have explained to the patient the risks and benefits of transfusion of blood products.  This includes, as appropriate, the risk of mild allergic reaction, hemolytic reaction, transfusion-associated lung injury, febrile reactions, circulatory or iron overload, and infection.     We discussed possible alternatives and their risks, including directed donation, autologous transfusion, and no transfusion, including IV or oral iron supplementation, as appropriate.  I believe the patient understands the risks and benefits and was able to express understanding.     2:44 PM Patient was reevaluated at bedside. He remains tachycardic in the 170 range. Dr. Faye at bedside. Lactic acid returned at 18.1 and BiCarb of 8.     2:50 PM Repeat lactic acid will be drawn now after fluid resuscitation.     3:00 PM I found out that there are no pediatric ICU beds available at this time, patient will be admitted to the medical ICU     3:55 PM discussed case with intensivist Dr. Apodaca who will come down evaluate patient and hospitalize for ongoing management.  Patient's Levophed is now at 0.6 , discussed this with Dr. Apodaca we will also initiate vasopressin     4:26PM patient has not been able to urinate, discussed Baer catheterization with oncology, given his severe immunosuppression at this time would avoid Baer catheterization.  They are recommending straight cath if needed.  Patient is straight cathed and 400 cc of urine is obtained     4:42PM patient is still tachycardic, I feel this is contributing to his hypotension.  Discussed this with Dr. Apodaca and pharmacist.  We may consider initiating esmolol drip if blood products are not improving his rate.  He is currently receiving platelets and red blood cells Lovenox.  He is being transferred to the ICU in critical condition.     HYDRATION: Based on the patient's presentation of Sepsis the patient was given IV  fluids. IV Hydration was used because oral hydration was not adequate alone. Upon recheck following hydration, the patient was improved.      DISCUSSION  Patient is a 22-year-old male who comes in for evaluation of nausea, vomiting abdominal pain.  Differential diagnosis includes sepsis, bacteremia, abdominal infection, pneumonia, urinary tract infection, close head injury, intracranial hemorrhage, facial fracture, viral syndrome.  On arrival patient is hypotensive and tachycardic in the 170s.  Large-bore IV was established by EMS, his port is accessed immediately for further resuscitation.  He is started on IV fluids on pressure bags.  EKG done at bedside demonstrates sinus tachycardia.  This may be SVT and therefore I will attempt cardioversion.  Given patient's recent chemotherapy he is also high risk for infection and sepsis protocol was initiated as well.     Patient's blood pressure improved to the 110s systolic with fluid resuscitation.  I attempted to give patient adenosine and heart rate improved to 120s, repeat EKG demonstrated sinus tachycardia, blood pressure did not improve with improvement in rate.  After few minutes heart rate returned to the 170s.  At this time is this is sinus tachycardia I do not believe he would benefit from electrical cardioversion.  Given concern for sepsis patient is empirically started on cefepime and vancomycin.  Pharmacy team was at bedside and agrees with this plan of antibiotics.  Given patient's facial and abdominal pain I elected to take him to CT scanner for further evaluation with scans of his head and abdomen.  I accompanied patient to CT and we were able to get the scans however patient did become more hypotensive while in the CT scanner.  We went back to his room patient was started on Levophed through his port.  CT imaging demonstrated no acute intra-abdominal or intracranial processes.     Maps improved with Levophed, he did ultimately required titration of  Levophed up to 0.7.  Pediatric oncologist Dr. Duenas was at bedside when patient returned from CT scan.  Labs are returning and were notable for anemia and thrombocytopenia and therefore she recommended irradiated platelets and blood.  Pediatric oncology did want patient in the pediatric ICU, I discussed the case with Dr. Ybarra however they do not have beds available at this time, therefore patient will be hospitalized in the medical ICU.  Therefore ultimately discussed the case with Dr. Apodaca.  Labs returned and were also notable for elevated troponin likely secondary to rate dependent and acute illness, no evidence of acute ischemia on EKG.  Procalcitonin is elevated 2.33 consistent with likely bacterial infection, most likely bacteremia.     Initial metabolic panel notable for hyperglycemia, anion gap and low bicarb, likely secondary to his lactic acidosis of 18.  There is possible concern per pediatric oncology from steroid-induced DKA.  Repeat labs after fluid resuscitation demonstrate blood sugar 169, anion gap of 24 and bicarb of 7.  Discussed potential need for insulin infusion with intensivist Dr. Apodaca who would not recommend starting it at this time, recommends continued aggressive fluid resuscitation.  Gap acidosis likely secondary to the lactic acidosis from his sepsis.  Viral swabs are trended and are negative for infection.  Chest x-ray returns demonstrates no evidence of pneumonia.     Patient's labs are also notable for hypocalcemia, this was prior to blood products.  Given his tachycardia and systemic body aches he was treated with calcium chloride through his port.  During his emergency department course he remained persistently tachycardic.  Discussed with pharmacy team and Dr. Apodaca about possible rate control.  Dr. Apodaca will consider rate control if he does not respond to blood product administration.  He is started on platelets and transferred to the ICU in critical condition.     Inpatient  course: Once patient arrived to CICU, underwent very aggressive resuscitation. He received NS bolus, 4 units of pRBCs, 2 units of platelets, 3 units of FFP and 2 units of cryoprecipitate. Continued on stress dose hydrocortisone. Vasopressors titrated (norepinephrine and vasopressin). Continued on empiric broad spectrum antibiotics- cefepime and vancomycin. Had one large bloody emesis- started on protonix drip. NG tube placed.    HOSPITAL COURSE:      JULY was admitted on 12/27/2022 following a syncopal episode at home.  Upon his arrival to [facility] ED by EMS, he was noted to be in severe septic shock.  Initial laboratory work-up identified DIC and metabolic acidosis with significant lactic acidosis.  He was resuscitated with fluids, blood products and started on Ross antibiotics.  Ultimately, blood cultures would grow a combination of E. coli and Klebsiella bacteria.  Following initial resuscitation in the ED, JULY was transferred to the CICU for further resuscitation and care.  Significant volume was infused to include PRBCs, platelets, FFP and cryo.  He continued on his pressor support until blood pressures and sepsis was stabilized.  He did have significant issues with volume overload requiring diuresis as well as high flow oxygen.  Within 48 hours, JULY stabilized considerably and was no longer requiring aggressive resuscitation.  He did continue to have issues with hematemesis and gastric bleeding for which he was put on drip PPI.  In addition to his initial presentation of severe sepsis, he also had significant lactic acidosis that caused him to have significant pain in his back and legs for which he was treated.  JULY also developed hyperglycemia for which he was temporarily treated with insulin.  By Hospital Day 5, he had been weaned off of his pressors and was stable.  He continued with stress dose hydrocortisone.  But at this time he began to advance diet and was weaning on his oxygen.  On 1/1/2023 he was  transferred to CNU where he remained for the remainder of his course.  He remained on cefepime throughout the remainder of his hospitalization.  And PT and OT were consulted for his deconditioning following his sepsis.  He did continue to complain of significant lower extremity pain which prohibited him from ambulation.  He was worked up for DVT which was negative.  He remained on his apixaban to prevent possibility of DVT given that he was not ambulating regularly.  For pain he was placed on a Dilaudid PCA. Tomas Roy also was noted to have significant exertional tachycardia when he was getting up from bed to the chair.  Ultimately he was transfused again towards the end of his hospitalization which improved his tachycardia.  His pain improved by Hospital Day 14 and his Dilaudid PCA was weaned.  Tomas Roy restarted his imatinib on 1/8/2023.  As he was getting ready to be discharged, continues to have pain in left lower extremity.  Also with physical exam findings consistent with mass.  Brought to IR for evaluation and drainage.  Drainage revealed hematoma.  Given stable condition at Hospital Day 18 without fevers, counts have recovered.  Cefepime was discontinued and Tomas Roy discharged home in stable condition on 1/14/2023.    HOME MEDICATIONS:    No current facility-administered medications on file prior to encounter.     Current Outpatient Medications on File Prior to Encounter   Medication Sig Dispense Refill    vitamin D3 (CHOLECALCIFEROL) 1000 Unit (25 mcg) Tab Take 1 Tablet by mouth every day.      therapeutic multivitamin-minerals (THERAGRAN-M) Tab Take 1 Tablet by mouth every day.         DIET:  Regular diet, age appropriate.      ACTIVITY:  Regular activity tolerated.      MEDICAL CONDITION:  Stable.    DISCHARGE INSTRUCTIONS:  1) call for fever greater than 100.4 °F  2) call for worsening lower extremity pain  3) call for easy bruising or bleeding  4) return to clinic in 3 days for  scheduled Day 29 chemotherapy    Pepe Faye MD  Pediatric Hematology / Oncology  [facility]   Direct Ph. [phone] / Cell. [phone]  [email or web address]

## 2023-03-01 NOTE — DISCHARGE PLANNING
Case Management Discharge Planning      RNCM received msg from Caryl HENDRICKS @ [facility] stating pt's provider had wanted her to get prior auth for pt's medication. Prior auth can take up to 24 hours but since pt was d/c'd she was told pharmacist could call and get a one time override for the med. RNCM passed information to pharmacist but when pharmacist called they were told RNCM needed to call. RNCM spoke with Francis @ [phone] eveline#6 regarding a one time over ride for Morphine ER MS Contin 15mg for discharge as prior auth would take approx 24 hours to receive and pt ready for d/c today. Francis was able to give the override but only for 15 days of supply. RNCM left voalte msg for Dinorah in outpatient pharmacy to give it 5 min and then can reprocess the medication. RNCM then called and spoke with Caryl HENDRICKS in [facility] to let her know that only a 15 day supply could be over rode. Caryl to notify the provider to let her know in case pt would need a longer supply of meds.

## 2023-03-01 NOTE — DISCHARGE INSTRUCTIONS
PATIENT INSTRUCTIONS:      Given by:   Nurse    Instructed in:  If yes, include date/comment and person who did the instructions       A.D.L:       NA                Activity:      Yes       Ok to resume previous activity as tolerated    Diet::          Yes       Ok to resume previous diet as tolerated    Medication:  Yes Please follow all instructions as listed on prescriptions    Equipment:  NA    Treatment:  NA      Other:          Yes. Please return to the ER or call your doctor for any worsening concerns.    Education Class:  NA    Patient/Family verbalized/demonstrated understanding of above Instructions:  yes  __________________________________________________________________________    OBJECTIVE CHECKLIST  Patient/Family has:    All medications brought from home   NA  Valuables from safe                            NA  Prescriptions                                       Yes  All personal belongings                       Yes  Equipment (oxygen, apnea monitor, wheelchair)     NA  Other: NA    _________________________________________________________________________    Rehabilitation Follow-up: NA    Special Needs on Discharge (Specify) NA    Oxycodone tablets or capsules  What is this medicine?  OXYCODONE (ox i KOE done) is a pain reliever. It is used to treat moderate to severe pain.  This medicine may be used for other purposes; ask your health care provider or pharmacist if you have questions.  COMMON BRAND NAME(S): Dazidox, Endocodone, Oxaydo, OXECTA, OxyIR, Percolone, Roxicodone, Roxybond  What should I tell my health care provider before I take this medicine?  They need to know if you have any of these conditions:  [place]'s disease  brain tumor  head injury  heart disease  history of drug or alcohol abuse problem  if you often drink alcohol  kidney disease  liver disease  lung or breathing disease, like asthma  mental illness  pancreatic disease  seizures  thyroid disease  an unusual or allergic reaction  to oxycodone, codeine, hydrocodone, morphine, other medicines, foods, dyes, or preservatives  pregnant or trying to get pregnant  breast-feeding  How should I use this medicine?  Take this medicine by mouth with a glass of water. Follow the directions on the prescription label. You can take it with or without food. If it upsets your stomach, take it with food. Take your medicine at regular intervals. Do not take it more often than directed. Do not stop taking except on your doctor's advice.  Some brands of this medicine, like Oxecta, have special instructions. Ask your doctor or pharmacist if these directions are for you: Do not cut, crush or chew this medicine. Swallow only one tablet at a time. Do not wet, soak, or lick the tablet before you take it.  A special MedGuide will be given to you by the pharmacist with each prescription and refill. Be sure to read this information carefully each time.  Talk to your pediatrician regarding the use of this medicine in children. Special care may be needed.  Overdosage: If you think you have taken too much of this medicine contact a poison control center or emergency room at once.  NOTE: This medicine is only for you. Do not share this medicine with others.  What if I miss a dose?  If you miss a dose, take it as soon as you can. If it is almost time for your next dose, take only that dose. Do not take double or extra doses.  What may interact with this medicine?  This medicine may interact with the following medications:  alcohol  antihistamines for allergy, cough and cold  antiviral medicines for HIV or AIDS  atropine  certain antibiotics like clarithromycin, erythromycin, linezolid, rifampin  certain medicines for anxiety or sleep  certain medicines for bladder problems like oxybutynin, tolterodine  certain medicines for depression like amitriptyline, fluoxetine, sertraline  certain medicines for fungal infections like ketoconazole, itraconazole, voriconazole  certain  medicines for migraine headache like almotriptan, eletriptan, frovatriptan, naratriptan, rizatriptan, sumatriptan, zolmitriptan  certain medicines for nausea or vomiting like dolasetron, ondansetron, palonosetron  certain medicines for Parkinson's disease like benztropine, trihexyphenidyl  certain medicines for seizures like phenobarbital, phenytoin, primidone  certain medicines for stomach problems like dicyclomine, hyoscyamine  certain medicines for travel sickness like scopolamine  diuretics  general anesthetics like halothane, isoflurane, methoxyflurane, propofol  ipratropium  local anesthetics like lidocaine, pramoxine, tetracaine  MAOIs like Carbex, Eldepryl, Marplan, Nardil, and Parnate  medicines that relax muscles for surgery  methylene blue  nilotinib  other narcotic medicines for pain or cough  phenothiazines like chlorpromazine, mesoridazine, prochlorperazine, thioridazine  This list may not describe all possible interactions. Give your health care provider a list of all the medicines, herbs, non-prescription drugs, or dietary supplements you use. Also tell them if you smoke, drink alcohol, or use illegal drugs. Some items may interact with your medicine.  What should I watch for while using this medicine?  Tell your doctor or health care professional if your pain does not go away, if it gets worse, or if you have new or a different type of pain. You may develop tolerance to the medicine. Tolerance means that you will need a higher dose of the medicine for pain relief. Tolerance is normal and is expected if you take this medicine for a long time.  Do not suddenly stop taking your medicine because you may develop a severe reaction. Your body becomes used to the medicine. This does NOT mean you are addicted. Addiction is a behavior related to getting and using a drug for a non-medical reason. If you have pain, you have a medical reason to take pain medicine. Your doctor will tell you how much medicine to  take. If your doctor wants you to stop the medicine, the dose will be slowly lowered over time to avoid any side effects.  There are different types of narcotic medicines (opiates). If you take more than one type at the same time or if you are taking another medicine that also causes drowsiness, you may have more side effects. Give your health care provider a list of all medicines you use. Your doctor will tell you how much medicine to take. Do not take more medicine than directed. Call emergency for help if you have problems breathing or unusual sleepiness.  You may get drowsy or dizzy. Do not drive, use machinery, or do anything that needs mental alertness until you know how the medicine affects you. Do not stand or sit up quickly, especially if you are an older patient. This reduces the risk of dizzy or fainting spells. Alcohol may interfere with the effect of this medicine. Avoid alcoholic drinks.  This medicine will cause constipation. Try to have a bowel movement at least every 2 to 3 days. If you do not have a bowel movement for 3 days, call your doctor or health care professional.  Your mouth may get dry. Chewing sugarless gum or sucking hard candy, and drinking plenty of water may help. Contact your doctor if the problem does not go away or is severe.  What side effects may I notice from receiving this medicine?  Side effects that you should report to your doctor or health care professional as soon as possible:  allergic reactions like skin rash, itching or hives, swelling of the face, lips, or tongue  breathing problems  confusion  signs and symptoms of low blood pressure like dizziness; feeling faint or lightheaded, falls; unusually weak or tired  trouble passing urine or change in the amount of urine  trouble swallowing  Side effects that usually do not require medical attention (report to your doctor or health care professional if they continue or are bothersome):  constipation  dry mouth  nausea,  vomiting  tiredness  This list may not describe all possible side effects. Call your doctor for medical advice about side effects. You may report side effects to FDA at 8-575-LWD-8764.  Where should I keep my medicine?  Keep out of the reach of children. This medicine can be abused. Keep your medicine in a safe place to protect it from theft. Do not share this medicine with anyone. Selling or giving away this medicine is dangerous and against the law.  Store at room temperature between 15 and 30 degrees C (59 and 86 degrees F). Protect from light. Keep container tightly closed.  This medicine may cause harm and death if it is taken by other adults, children, or pets. Return medicine that has not been used to an official disposal site. Contact the [facility] at [phone] or your [facility] government to find a site. If you cannot return the medicine, flush it down the toilet. Do not use the medicine after the expiration date.  NOTE: This sheet is a summary. It may not cover all possible information. If you have questions about this medicine, talk to your doctor, pharmacist, or health care provider.  © 2020 [employer] (2018-04-24 16:13:10)

## 2023-03-01 NOTE — CARE PLAN
The patient is Stable - Low risk of patient condition declining or worsening    Shift Goals  Clinical Goals: pain control  Patient Goals: go for a walk, go home  Family Goals: LELA    Progress made toward(s) clinical / shift goals:  Patient transitioned off of PCA pump for pain and began oral pain medication regimen. MD consulted on best pain management process. Patient reported decreased levels of pain with new medication changes. Patient able to move around easier and eat throughout the shift.    Patient is progressing towards the following goals:    Problem: Pain - Standard  Goal: Alleviation of pain or a reduction in pain to the patient’s comfort goal  Outcome: Progressing     Problem: Nutrition - Standard  Goal: Patient's nutritional and fluid intake will be adequate or improve  Outcome: Progressing

## 2023-03-01 NOTE — THERAPY
Physical Therapy Contact Note    Patient Name: Tomas Jean-Baptiste  Age:  22 y.o., Sex:  male  Medical Record #: [id number]  Today's Date: 2/28/2023    PT tx attempted x2 this afternoon. Per RN pts PCA discontinued this am and he has been in quite a bit of pain. Plan to re-attempt PT tx tomorrow as pt able to tolerate.    Odette Montes, PT, DPT  Ext. [phone]

## 2023-03-01 NOTE — PROGRESS NOTES
Pediatric Hematology/Oncology  Daily Progress Note      Patient Name:  Tomas Jean-Baptiste  : 2000  MRN: [id number]    Location of Service:  [facility]  Date of Service: 2023  Time: 08:55 PM    Hospital Day:     Protocol / Treatment Plan: [place] Chromosome Precursor B-Cell Acute Lymphoblastic Leukemia, ON STUDY [id number], Start of Capizzi MTX Interim Maintenance SR, Day 2    SUBJECTIVE:     No acute events overnight.  Afebrile Tmax 99.9 °F. Tolerated LP with IT chemotherapy yesterday. Denies any back pain. Not complaining of headaches, any changes in vision or neurologic status changes. Still c/o of L shoulder pain. Tolerated decrease in basal rate of PCA yesterday. Pressed for bolus 4 times. OK with discontinuing PCA altogether today and continue with OxyContin and Oxycodone. Moving L shoulder a little bit.  Emesis x 1. Otherwise no reports of nausea, diarrhea or constipation.  Stooling regularly.   Still has not been able to eat any solid food and is continuing to take protein shakes, broth.  No shortness of breath, difficulty breathing, cough or chest pain.  Did not require oxygen overnight and maintaining oxygen saturation on RA.  No new rashes or skin changes.  Incision site of left shoulder is clean, dry and intact.  No other concerns or complaints at this time.    Review of Systems:     Constitutional: Afebrile, slightly improved energy.  Decreased appetite and oral intake.  HENT: Negative.  Eyes: Negative for visual changes.  Respiratory: Negative for shortness of breath.  Cardiovascular: Negative for chest pain.  Gastrointestinal: Negative for nausea, abdominal pain, diarrhea, constipation. Drinking only protein shakes. One episode of emesis.  Genitourinary: Negative.  Musculoskeletal: Left shoulder pain.  Skin: Negative for rash or skin infection.  Neurological: Negative for numbness, tingling, sensory changes, weakness or headaches.   "  Endo/Heme/Allergies: No bruising/bleeding easily.    Psychiatric/Behavioral: Slightly better mood today    OBJECTIVE:     Max Temp: Temp (24hrs), Av.7 °C (98 °F), Min:36.4 °C (97.5 °F), Max:37 °C (98.6 °F)    Vitals: /69   Pulse 77   Temp 36.8 °C (98.2 °F) (Temporal)   Resp 16   Ht 1.651 m (5' 5\")   Wt 64.9 kg (143 lb 1.3 oz)   SpO2 94%   BMI 23.81 kg/m²     I/O:   Intake/Output Summary (Last 24 hours) at 2023  Last data filed at 2023 1215  Gross per 24 hour   Intake 27.65 ml   Output 1535 ml   Net -1507.35 ml       Labs:    Most Recent Hematology Labs:    Recent Labs     23  0622   WBC 2.9*   RBC 2.98*   HEMOGLOBIN 8.5*   HEMATOCRIT 26.2*   MCV 87.9   MCH 28.5   RDW 52.5*   PLATELETCT 124*   MPV 10.1   NEUTSPOLYS 73.10*   LYMPHOCYTES 12.30*   MONOCYTES 13.30   EOSINOPHILS 0.00   BASOPHILS 0.30         Most Recent Metabolic Panel:     Latest Reference Range & Units 23 13:37   Sodium 135 - 145 mmol/L 136   Potassium 3.6 - 5.5 mmol/L 3.8   Chloride 96 - 112 mmol/L 104   Co2 20 - 33 mmol/L 27   Anion Gap 7.0 - 16.0  5.0 (L)   Glucose 65 - 99 mg/dL 108 (H)   Bun 8 - 22 mg/dL 2 (L)   Creatinine 0.50 - 1.40 mg/dL 0.33 (L)   GFR (CKD-EPI) >60 mL/min/1.73 m 2 168   Calcium 8.5 - 10.5 mg/dL 7.6 (L)   Correct Calcium 8.5 - 10.5 mg/dL 9.0   AST(SGOT) 12 - 45 U/L 19   ALT(SGPT) 2 - 50 U/L 11   Alkaline Phosphatase 30 - 99 U/L 110 (H)   Total Bilirubin 0.1 - 1.5 mg/dL 0.3   Direct Bilirubin 0.1 - 0.5 mg/dL <0.2   Indirect Bilirubin 0.0 - 1.0 mg/dL see below   Albumin 3.2 - 4.9 g/dL 2.3 (L)   Total Protein 6.0 - 8.2 g/dL 4.2 (L)   Globulin 1.9 - 3.5 g/dL 1.9   A-G Ratio g/dL 1.2     Physical Exam:    Constitutional: Well-appearing. Not in distress.  HENT: Normocephalic and atraumatic. Alopecia.  No rhinorrhea. Oropharynx is clear and moist.   Eyes: Conjunctivae are normal. EOMI. nonicteric.  Neck: Normal range of motion of neck, no adenopathy.    Cardiovascular: Normal rate, regular " rhythm.  No murmur. DP/radial pulses 2+, cap refill < 2 sec.  Pulmonary/Chest: Effort normall. No respiratory distress. Symmetric expansion.  No crackles or wheezes.  Abdomen: Soft. Bowel sounds are normal. No distension and no mass. There is no hepatosplenomegaly.    Genitourinary:  Deferred  Musculoskeletal: Slightly improved range of motion of left shoulder.  Still with poor strength in shoulder.    Neurological: Alert and oriented to person and place.  Gait not assessed.    Skin: Skin is warm, dry and pink.  No rash or evidence of skin infection.  Very well-healing left shoulder incision without any signs infection  Psychiatric: Mood still flat but improving.    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    Tolerated Day 1 VCR and IV Mtx yesterday. One episode of emesis today. Will administer Zofran PO x 1. Will discontinue PCA and give scheduled Oxycontin and PRN oxycodone for breakthrough. Will switch IV flagyl to PO flagyl today.     1) Soft Tissue Infection / Joint Infection with Bacteroides fragilis:  - Edema and pain continue to improve  - X-ray L shoulder 2/8/2023: not concerning  - MRI shoulder 2/10/2023: showing diffuse myositis and some intermuscular edema/fluid  - No evidence of DVT  - Was initially on cefepime for F&N, added IV Vancomycin on 2/10/2023, discontinued both cefepime and vancomycin on 2/20/2023 after start of IV Flagyl (below)  - S/P open exploration/drainage 2/17/2023  - Culture: B.fragilis (both deep and superficial cultures)   - ID Consultation with Dr. Singh - recommendation for switch to metronidazole for 4+ weeks  -  Flagyl 500 mg IV Q8 hours, will switch to PO flagyl 500 mg every 8 hrs today (Day 10)     ** Dr. Faye spoke again with Dr. Singh - will attempt total of 6 weeks of Flagyl - if neuropathies complicate care can give 4+ weeks of therapy   - IgG adequate at 525 mg/dL     2) Left Shoulder Pain:  - Pain overall improving  - Will discontinue dilaudid PCA today  - Continue OxyContin 10 mg every 8 hrs and add Oxycodone 5-10 mg every 4 hrs PRN for breakthrough pain     3) Febrile Neutropenia in Immunocompromised Host: (RESOLVED)  - Previous history of gram negative septic shock  - Blood culture from port 2/7/2023: NGTD  - Repeat blood culture from port 2/9/2023: NGTD  - IV Cefepime every 8 hrs (Discontinued 2/20/2023)  - IV Vancomycin started on 2/10/2023 (Discontinued 2/20/2023)     4) Tachycardia:   - Some PVCs noted on tele but hemodynamically stable  - Recently admitted with severe septic shock, hence consider cardiomyopathy  - Consulted cardiology (Dr Galvez): Holter monitor and repeat ECHO essentially unremarkable  - No additional interventions  - As orthostasis and deconditioning are consideration  - Working with PT/OT     5) At Risk of Opioid Induced Constipation:   - Senna-docusate Daily  - Encouraged ambulation as tolerated.  - Stooling regularly     6) Retrosternal Chest Pain (RESOLVED):  - Likely GERD  - Continue famotidine 20 mg BID  - Continue to monitor     7) Poor Appetite / Nausea  - Reports nausea specifically when eating solids  - Encouraged slowly advancing diet     8) Hypoxemia (IMPROVED):   - Did not require oxygen overnight              - Continue Incentive Spirometry                       - Ambulation as tolerated              - Up and to chair as tolerated     9) Ph+ Precursor B-Cell Acute Lymphoblastic Leukemia, in MRD Remission:     [id number], Arm B, Capizzi MTX Interim Maintenance SR, Day 2    ** LP with IT Mtx 12 mg x 1 on Day 1 (COMPLETE), See separate procedure note  ** Vincristine 2 mg IV x  1 on Day 1 (COMPLETE)  ** Methotrexate  mg/m2/dose= 173 mg IV x 1 on Day 1 (COMPLETE)  ** Pegaspargase IV 2500 IU/m2= 4325.25 Units IV x 1 on Day 2 (will move to Day 3 pending discharge plan)     ** Continue imatinib 600 mg PO daily                     10) Chemotherapy Related Pancytopenia:  - Transfused pRBCs last on 2/16/2023 and again 2/22/2023  - Transfuse for hemoglobin less than 7.5 gm/dL or symptomatic  - Transfuse for platelets less than 10,000/microliters or symptomatic  - CBC tomorrow am  - No indication for transfusion today     11) At Risk for DVT Secondary to PEG Asparaginase:   - Platelets >50k  - Currently on apixaban 2.5 mg PO BID which was HELD for LP. Will discontinue and restart as outpatient given that patient is due for another dose of pegaspargase on Day 2 of IM-II.  - Still at risk of thromboembolism secondary to inadequate ambulation, S ordered CDs              12) At Risk for Nausea and Vomiting Secondary to Chemotherapy           -  Zofran prior to IT chemotherapy on 2/27/2023           - Zofran PO 8 mg ODT and ativan IV available as PRN                13) At Risk of Opportunistic Lung Infection:  - Bactrim DS PO BID Sat and Sun for PJP prophylaxis  - Chlorhexidine mouthwash 4 times daily     14) Central Access:   - R Port-A-Cath in place      Research Participant:           [facility] - Source Data         Diagnosis: Ph+ Precursor B-Cell Acute Lymphoblastic Leukemia     Disease Status: EOI1A MRD NEGATIVE, EOI1B RD NEGATIVE, CNS3c, testicular negative, HSV1 IgG POSITIVE, CMV IgG NEGATIVE, VARICELLA IgG POSITIVE     Active Studies: [id number], [id number]                                                                                                      Inactive Studies: [id number]                                                                                                                                                Optional Studies: None             Protocol:  International Phase 3 trial in [place] 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      Treatment Plan: [id number](OS), AR-Arm B, Capizzi MTX Interim Maintenance SR, Day 2     Height: 1.650 m      Weight: 64.9 kg       BSA: 1.73 m²   ( Capizzi MTX Interim Maintenance Day 1 2/27/2023)                                                                                                                                           Performance Status: Karnofsky 60, ECOG  3 (2/27/2023)     Treatment Plan Medications:  (100% adherent with imatinib)     Imatinib dose adjusted for weight at DI, Day 29 to Imatinib 600 mg PO daily in AM (The imatinib dose is recalculated based on BSA every 12 weeks)     Evaluations / Study Labs:  (2/28/2023)     None      Therapy Given: (2/28/2023)     Imatinib 600 mg PO QAM     Toxicities:     **Grade 3 febrile neutropenia on 2/7/2023 (ANC <1000/mm3 with a single temperature of >38.3 degrees C (101 degrees F) or a sustained temperature of >=38 degrees C (100.4 degrees F) for more than one hour)  ** Grade 3 diarrhea on 2/6/2023 (Increase of >=7 stools per day  over baseline; hospitalization indicated, limiting self care ADL): Resolved 2/7/2023  ** Grade 2 diarrhea on 2/7/2023 (Increase of 4 - 6 stools per day  over baseline; limiting instrumental ADL) Resolved 2/8/2023  ** Grade 3 vomiting on 2/6/2023 and 2/7/2023 (hospitalization indicated) Resolved 2/8/2023  ** Grade 2 Myositis (Moderate pain associated with weakness; pain limiting, instrumental ADL): Present on admission on 2/7/2023  ** Grade 3 Hypoxemia (Decreased oxygen saturation at rest (e.g., pulse oximeter  <88% or PaO2 <=55 mm Hg), started 2/10/2023 (RESOLVED 2/24/2023)  ** Grade 3 soft tissue infection (invasive intervention indicated - TREATING)          Disposition: Likely discharge tomorrow. Bedside nurse, JULY and Dr. Faye updated with  plan.       Alyce Duenas MD  Pediatric Hematology / Oncology  [facility]  Cell.  [phone]  [facility]. [phone]

## 2023-03-02 ENCOUNTER — HOSPITAL ENCOUNTER (OUTPATIENT)
Dept: RADIATION ONCOLOGY | Facility: MEDICAL CENTER | Age: 23
End: 2023-03-31
Attending: RADIOLOGY
Payer: COMMERCIAL

## 2023-03-02 VITALS
BODY MASS INDEX: 21.97 KG/M2 | OXYGEN SATURATION: 97 % | WEIGHT: 132.2 LBS | HEART RATE: 121 BPM | DIASTOLIC BLOOD PRESSURE: 81 MMHG | SYSTOLIC BLOOD PRESSURE: 120 MMHG

## 2023-03-02 DIAGNOSIS — C91.01 ALL (ACUTE LYMPHOID LEUKEMIA) IN REMISSION (HCC): ICD-10-CM

## 2023-03-02 PROCEDURE — 99212 OFFICE O/P EST SF 10 MIN: CPT | Performed by: RADIOLOGY

## 2023-03-02 PROCEDURE — 99214 OFFICE O/P EST MOD 30 MIN: CPT | Performed by: RADIOLOGY

## 2023-03-02 ASSESSMENT — FIBROSIS 4 INDEX: FIB4 SCORE: 1.227581902923751892

## 2023-03-02 ASSESSMENT — PAIN SCALES - GENERAL: PAINLEVEL: 5=MODERATE PAIN

## 2023-03-02 NOTE — DISCHARGE SUMMARY
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 Vancomycin started on 2/10/2023 (Discontinued 2/20/2023)     4) Tachycardia: Still tachycardic at the time of discharge  - Some PVCs noted on tele but hemodynamically stable  - Recently admitted with severe septic shock, hence consider cardiomyopathy  - Consulted cardiology (Dr Galvez): Holter monitor and repeat ECHO essentially unremarkable  - No additional interventions  - As orthostasis and deconditioning are consideration  - Working with PT/OT     5) At Risk of Opioid Induced Constipation:   - Senna-docusate Daily  - Encouraged ambulation as tolerated  - Stooling regularly     6) Retrosternal Chest Pain (RESOLVED):  - Admitted with the symptom which resolved after starting famotidine, not an issue at the time of discharge  - Likely GERD  - Continue famotidine 20 mg BID     7) Poor Appetite / Nausea  - Nausea specifically when eating solids  - Patient was initially eating soild food, later was just drinking protein shakes, supplements, water , juice  - Instructed to advance diet as  tolerated, however , at the time of discharge patient was still taking liquids  - Very sensitive to smell and has emesis      8) Hypoxemia (RESOLVED): Was hypoxemic to mid 80's few days after admission. Thought to be secondary to atelectasis.  - Intermittently on supplemental oxygen via NC  - Off of oxygen atleast 4-5 days prior discharge and breathing well on RA  - Continued Incentive Spirometry while inpatient  - Encouraged ambulation as tolerated                   9) Ph+ Precursor B-Cell Acute Lymphoblastic Leukemia, in MRD Remission: Started Day 1 IM on 2/27/2023 (delayed by a week). Continued Imatinib throughout hospital stay. Imatinib dose not held.     [id number], Arm B, Capizzi MTX Interim Maintenance SR, Day 3   ** LP with IT Mtx 12 mg x 1 on Day 1 (COMPLETE, 2/27/2023), See separate procedure note. No blasts identified.  ** Vincristine 2 mg IV x 1 on Day 1 (COMPLETE, 2/27/2023)  ** Methotrexate  mg/m2/dose= 173 mg IV x 1 on Day 1 (COMPLETE, 2/27/2023)  ** Pegaspargase IV 2500 IU/m2= 4325.25 Units IV x 1 on Day 2 (However, given on Day 3 on 3/1/2023 in [facility])   ** Continue imatinib 600 mg PO daily                     10) Chemotherapy Related Pancytopenia:  - Transfuse for hemoglobin less than 7.5 gm/dL or symptomatic  - Transfuse for platelets less than 10,000/microliters or symptomatic  -- Transfused pRBCs on 2/8/2023, 2/9/2023, 2/16/2023 and again 2/22/2023  - Transfused platelets on 2/11/2023     11) At Risk for DVT Secondary to PEG Asparaginase:   - Apixaban held on admission as platelets below threshold  - Platelets >50,000/microliters, hence restarted apixaban on 2/19/2023  - Apixaban 2.5 mg PO BID HELD on 2/25/2023 and never restarted at the time of discharge. Plan to restart as outpatient given that patient received pegaspargase today  - Encouraged ambulation and hydration           12) At Risk for Nausea and Vomiting Secondary to Chemotherapy  -  Zofran prior to IT chemotherapy on 2/27/2023  -  Received Zofran PO 8 mg ODT and ativan IV as PRN  - Very sensitive to various smell               13) At Risk of Opportunistic Lung Infection:   - Continued Bactrim DS PO BID Sat and Sun for PJP prophylaxis  - Continued Chlorhexidine mouthwash 4 times daily while inpatient, did not discharge home.     HOME MEDICATIONS:    Home Medications    Medication Sig Taking? Last Dose Authorizing Provider   morphine ER (MS CONTIN) 15 MG Tab CR tablet Take 1 Tablet by mouth every 8 hours for 30 days. Yes  Alyce Duenas M.D.   famotidine (PEPCID) 20 MG Tab Take 1 Tablet by mouth 2 times a day. Yes  Alyce Duenas M.D.   metroNIDAZOLE (FLAGYL) 500 MG Tab Take 1 Tablet by mouth every 8 hours. Yes  Alyce Duenas M.D.   ondansetron (ZOFRAN ODT) 8 MG TABLET DISPERSIBLE Dissolve 1 Tablet by mouth every 6 hours as needed for Nausea/Vomiting. Yes  Alyce Duenas M.D.   oxyCODONE CR (OXYCONTIN) 10 MG Tablet Extended Release 12 hour Abuse-Deterrent Take 1 Tablet by mouth every 8 hours for 15 days. Yes  Alyce Duenas M.D.   oxyCODONE immediate-release (ROXICODONE) 5 MG Tab Take 1-2 Tablets by mouth every four hours as needed for Severe Pain for up to 15 days. Yes  Alyce Duenas M.D.   senna-docusate (PERICOLACE OR SENOKOT S) 8.6-50 MG Tab Take 1 Tablet by mouth every day. Yes  Alyce Duenas M.D.   imatinib (GLEEVEC) 100 MG tablet Take 200 mg by mouth every day. Pt takes 200 mg + 400 mg for a total dose of 600 mg daily Yes 2/7/2023 at AM Physician Outpatient   imatinib (GLEEVEC) 400 MG tablet Take 400 mg by mouth every day. Pt takes 200 mg + 400 mg for a total dose of 600 mg daily Yes 2/7/2023 at AM Physician Outpatient   sulfamethoxazole-trimethoprim (BACTRIM DS) 800-160 MG tablet Take 2 Tablets by mouth in the morning every Sat and Sun.  2/5/2023 Physician Outpatient   vitamin D3 (CHOLECALCIFEROL) 1000 Unit (25 mcg) Tab Take 1 Tablet by mouth every day.  2/7/2023 at AM Physician Outpatient   therapeutic multivitamin-minerals (THERAGRAN-M) Tab Take  1 Tablet by mouth every day.  2/7/2023 at AM Physician Outpatient       DIET:  Regular diet, age appropriate.      ACTIVITY:  Regular activity tolerated.      MEDICAL CONDITION:  Stable.    DISCHARGE INSTRUCTIONS:  Call for fever greater than 100.4 °F  Call for worsening L shoulder pain/swelling  Call for vomiting, decreased PO intake, decreased UO  Return to [facility] on 3/9/2023 for Day 11 chemotherapy infusion      Alyce Duenas MD  Pediatric Hematology / Oncology  [facility]   Direct Ph. [phone] / Cell. [phone]  [email or web address]

## 2023-03-02 NOTE — PROGRESS NOTES
RADIATION ONCOLOGY FOLLOW-UP    Patient name:  Tomas Jean-Baptiste    Primary Physician:  Margarito Arvizu M.D. MRN: [id number]  [facility]: [phone]   Referring physician:  Pepe Faye M.D.  : 2000, 22 y.o.     DATE OF SERVICE: 3/2/2023    IDENTIFICATION:   A 22 y.o. male with  B-ALL, PH+, CNS3 who is undergoing treatment on [id number]  who we are seeing for coordination of WBRT.      HISTORY OF PRESENT ILLNESS:  Armando MCDOWELL is a 21 yo male with a PMH of ALL, B lymphoblastic with BCR ABL fusion, undergoing treatment on COG [id number], taken off due to Ph+ status ->  now on COG AALL 1631. At present, he is on interim maintenance I, Day 3, on imatinib, vincristine, methotrexate, intrathecal methotrexate and PEG-aspargase.     INTERVAL HISTORY: He had been doing relatively well on treatment, and has been followed very closely by medical oncology.  Unfortunately, more recently, he presented in early February for day 50 of VCR and delayed intensification.  He was admitted for febrile neutropenia and left shoulder pain.  He was diagnosed with a soft tissue/joint infection of Bacteroides, and ultimately was finally discharged.  Interim maintenance was delayed by a week due to active infection.  Ultimately, he was finally discharged on  for day 2 of interim maintenance 1.  With regards to his left shoulder infection, he is status post open exploration and drainage performed on , and continues to recover.  He still has significant swelling and still has significant pain even with passive motion, but feels like he is improving.    Neurologically, he is doing relatively well, has no specific CNS related symptoms, continues to use his prism glasses particularly when outside of the house.  No recent history of seizures, or specific neurologic deficits globally.  Was previously doing well with school until his recent hospital infection, at this point has taken a break for the rest of the semester  and will plan to resume at the next semester either early and late summer of the fall.    He is now here today to discuss whole brain radiation as part of his treatment.    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 90 Tablet 0    famotidine (PEPCID) 20 MG Tab Take 1 Tablet by mouth 2 times a day. 60 Tablet 4    metroNIDAZOLE (FLAGYL) 500 MG Tab Take 1 Tablet by mouth every 8 hours. 100 Tablet 2    ondansetron (ZOFRAN ODT) 8 MG TABLET DISPERSIBLE Dissolve 1 Tablet by mouth every 6 hours as needed for Nausea/Vomiting. 10 Tablet 0    oxyCODONE CR (OXYCONTIN) 10 MG Tablet Extended Release 12 hour Abuse-Deterrent Take 1 Tablet by mouth every 8 hours for 15 days. 45 Tablet 0    oxyCODONE immediate-release (ROXICODONE) 5 MG Tab Take 1-2 Tablets by mouth every four hours as needed for Severe Pain for up to 15 days. 60 Tablet 0    senna-docusate (PERICOLACE OR SENOKOT S) 8.6-50 MG Tab Take 1 Tablet by mouth every day. 30 Tablet 0    imatinib (GLEEVEC) 100 MG tablet Take 200 mg by mouth every day. Pt takes 200 mg + 400 mg for a total dose of 600 mg daily      imatinib (GLEEVEC) 400 MG tablet Take 400 mg by mouth every day. Pt takes 200 mg + 400 mg for a total dose of 600 mg daily      sulfamethoxazole-trimethoprim (BACTRIM DS) 800-160  MG tablet Take 2 Tablets by mouth in the morning every Sat and Sun.      vitamin D3 (CHOLECALCIFEROL) 1000 Unit (25 mcg) Tab Take 1 Tablet by mouth every day.      therapeutic multivitamin-minerals (THERAGRAN-M) Tab Take 1 Tablet by mouth every day.       No current facility-administered medications for this encounter.       ALLERGIES:  Amoxicillin    REVIEW OF SYSTEMS:    A complete review of system taken. Pertinent items in HPI.  All others negative.    PHYSICAL EXAM:  PERFORMANCE STATUS:       View : No data to display.                   View : No data to display.              /81 (BP Location: Right arm, Patient Position: Sitting, BP Cuff Size: Large adult)   Pulse (!) 121   Wt 60 kg (132 lb 3.2 oz)   SpO2 97%   BMI 21.97 kg/m²   Physical Exam  Constitutional:       Appearance: Normal appearance.   Cardiovascular:      Rate and Rhythm: Normal rate and regular rhythm.   Musculoskeletal:      Comments: Diffuse L arm edema, L arm motion significantly pain limited still.    Neurological:      General: No focal deficit present.      Mental Status: He is alert and oriented to person, place, and time. Mental status is at baseline.       LABORATORY DATA:   Lab Results   Component Value Date/Time    WBC 2.2 (L) 03/01/2023 06:20 AM    RBC 2.97 (L) 03/01/2023 06:20 AM    HEMOGLOBIN 8.3 (L) 03/01/2023 06:20 AM    HEMATOCRIT 26.0 (L) 03/01/2023 06:20 AM    MCV 87.5 03/01/2023 06:20 AM    MCH 27.9 03/01/2023 06:20 AM    MCHC 31.9 (L) 03/01/2023 06:20 AM    RDW 50.9 (H) 03/01/2023 06:20 AM    PLATELETCT 98 (L) 03/01/2023 06:20 AM    MPV 11.5 03/01/2023 06:20 AM    NEUTSPOLYS 74.00 (H) 03/01/2023 06:20 AM    LYMPHOCYTES 11.90 (L) 03/01/2023 06:20 AM    MONOCYTES 13.20 03/01/2023 06:20 AM    EOSINOPHILS 0.00 03/01/2023 06:20 AM    BASOPHILS 0.00 03/01/2023 06:20 AM    HYPOCHROMIA 1+ 02/20/2023 06:15 AM    ANISOCYTOSIS 2+ (A) 02/20/2023 06:15 AM      Lab Results   Component Value Date/Time    SODIUM 136 02/27/2023 01:37  PM    POTASSIUM 3.8 02/27/2023 01:37 PM    CHLORIDE 104 02/27/2023 01:37 PM    CO2 27 02/27/2023 01:37 PM    GLUCOSE 86 03/01/2023 06:20 AM    BUN 2 (L) 02/27/2023 01:37 PM    CREATININE 0.33 (L) 02/27/2023 01:37 PM         RADIOLOGY DATA:  CT-EXTREMITY, LOWER WITH LEFT    Result Date: 1/13/2023  1.  Intramuscular rim-enhancing collection in the upper to mid calf laterally measuring about 2.5 x 1.6 x 5.1 cm, suspicious for abscess. Evolving hematoma would be differential consideration. 2.  No soft tissue gas or other fluid collection. 3.  No acute osseous abnormality.    CT-EXTREMITY, LOWER WITH RIGHT    Result Date: 1/13/2023  1.  No evidence of abscess or soft tissue gas. 2.  No acute osseous abnormality.    CT-EXTREMITY, UPPER WITH LEFT    Result Date: 2/16/2023  1.  Minimally enhancing left glenohumeral joint effusion, there is enhancing intramuscular fluid collection inferior to the joint space with appearance favoring vascular abscess, and may communicate with the joint effusion which raises concern for possible septic joint. 2.  Permeative appearance of the proximal humerus near suspected abscess, concerning for osteomyelitis, could be related to malignant changes given history. 3.  Scattered irregular ill-defined lytic areas in multiple vertebral bodies, likely corresponding with history of malignancy. 4.  Diffuse subcutaneous fat stranding from the mid upper arm through the visualized forearm, could represent edema and/or cellulitis. 5.  Small layering left pleural effusion. Linear densities of the left lung base favors atelectasis These findings were discussed with the patient's clinician, River Rodriguez, on 2/16/2023 7:40 AM.    DX-CHEST-PORTABLE (1 VIEW)    Result Date: 1/5/2023  No acute cardiopulmonary abnormality.    DX-CHEST-PORTABLE (1 VIEW)    Result Date: 1/4/2023  1.  Increased inflation and improvement of LEFT lung base consolidation. 2.  Supportive tubing is  unchanged.    DX-CHEST-PORTABLE (1 VIEW)    Result Date: 1/3/2023  1.  Suspect left lower lobe atelectasis or consolidation. Developing since prior exam.    DX-CHEST-PORTABLE (1 VIEW)    Result Date: 1/2/2023  Bilateral pulmonary opacities have essentially resolved. No effusions.     DX-SHOULDER 2+ LEFT    Result Date: 2/8/2023  No fracture or dislocation of LEFT shoulder.    MR-SHOULDER-WITH & W/O LEFT    Result Date: 2/10/2023  1.  Diffuse abnormal marrow signal involving the visualized portions of the humerus, scapula and clavicle characterized by mottled serpiginous heterogeneous areas of increased T2 and decreased T1 signal. This most likely is related to the patient's history of leukemia and represents a diffuse marrow disorder. 2.  Some periosteal enhancement surrounding the proximal humeral diaphysis and metaphysis with some slight cortical irregularity posteriorly and medially. This may be related to neoplastic process however infectious process could potentially have this appearance as well. 3.  Diffuse edema and enhancement of the deltoid, latissimus dorsi, short head of the biceps, coracobrachialis, supraspinatus, infraspinatus and subscapularis muscles with a small amount of intermuscular edema/fluid suggesting myositis. 4.  No focal abscess. 5.  Small glenohumeral joint effusion. 6.  No evidence of fracture.    US-EXTREMITY NON VASCULAR UNILATERAL LEFT    Result Date: 1/12/2023  1.  Probable hematoma in the area of pain and swelling in the left posterior lateral calf. Recommend clinical correlation and follow-up evaluation to evaluate for evolution.    IR-CONSULT AND TREAT    Result Date: 1/14/2023  Ultrasound-guided left lower extremity fluid aspiration as described.      IMPRESSION:    A 22 y.o. with B lymphoblastic leukemia, [place] chromosome positive, on treatment with COG AALL 1631. At present, he is on interim maintenance I, Day 3, on imatinib, vincristine, methotrexate, intrathecal  methotrexate and PEG-aspargase.       RECOMMENDATIONS:   I reviewed with JULY and his mother today his initial presentation with CNS3 ALL, and the role of whole brain radiation in the management of his ALL on 1631. We reviewed the plan for WBRT to help reduce CNS disease burden and ultimately reduce the risk of CNS relapse. I discussed the general disks and set up of daily radiation therapy for whole brain radiation over the course of 2 weeks, the treatment site that will encompass radiation to his whole brain, and the expected acute and long-term toxicities.  We reviewed dermatitis, alopecia, as well as fatigue and possibly nausea in the acute setting, though generally whole brain radiation is usually well-tolerated.  More importantly, given his young age, I reviewed with him a very low, but not insignificant risk of long-term toxicities and side effects including cognitive deficits particularly with working memory, as well as the risk of secondary malignancy such as gliomas or meningiomas in the distant future as a result of radiation therapy.  Certainly these risks are low, but not nonexistent.  We also discussed strategies for maintaining his cognitive output including puzzles, games, and other ways to ensure that he continues to exercise his brain and maintain his plasticity as best as possible at his young age.  Certainly he and his mother are in agreement with the plan for whole brain radiation, and I will work with medical oncology to coordinate the start date for this.  Approximately 1 week prior to start date, we will also plan to schedule a CT simulation in anticipation of whole brain radiation.    Thank you for the opportunity to participate in his care.  If any questions or comments, please do not hesitate in calling.  Approximately 40 minutes were spent on this encounter, including face-to-face visit, records review, discussion with medical oncology, and documentation and care coordination.    Orders  Placed This Encounter    Rad Onc Treatment Planning CT Simulation

## 2023-03-02 NOTE — ADDENDUM NOTE
Encounter addended by: Huong Foster R.N. on: 3/2/2023 8:00 AM   Actions taken: Charge Capture section accepted

## 2023-03-02 NOTE — CT SIMULATION
PATIENT NAME Tomas Jean-Baptiste   PRIMARY PHYSICIAN Margarito Arvizu [id number]   REFERRING PHYSICIAN Pepe Faye M.D. 2000     No matching staging information was found for the patient.       Treatment Planning CT Simulation      Order Questions     Question Answer    Is this for a new course of treatment? Yes    Is this an Addendum? No    Simulation Status Initial    Planned Start Date 4/3/2023    Treatment Site Brain    Laterality Midline    Treatment Technique 3D CRT    Treatment Pattern/Frequency Daily    Concurrent Chemotherapy No    CT Technique 3D    Slice Thickness 2mm    Scan Extent Brain    Treatment Device(s) Aquaplast Mask    Patient Position Supine    Patient Orientation Head First    Arm Position Up    Treatment Machine No preference    Treatment Image Guidance CBCT    Frequency (CBCT) Daily    Image Guidance Match Bone    Other Orders Weekly Physics Check    Release to patient Immediate            Comments     Start date is tentative, confirm with MD.

## 2023-03-02 NOTE — PROGRESS NOTES
Patient was seen today in clinic with Dr. Richards for a follow up.  Vitals signs and weight were obtained and pain assessment was completed.  Allergies and medications were reviewed with the patient.  Toxicities of treatment assessed.     Vitals/Pain:  Vitals:    03/02/23 1504   BP: 120/81   BP Location: Right arm   Patient Position: Sitting   BP Cuff Size: Large adult   Pulse: (!) 121   SpO2: 97%   Weight: 60 kg (132 lb 3.2 oz)   Pain Score: 5=Moderate Pain        Allergies:   Amoxicillin    Current Medications:  Current Outpatient Medications   Medication Sig Dispense Refill    morphine ER (MS CONTIN) 15 MG Tab CR tablet Take 1 Tablet by mouth every 8 hours for 30 days. 90 Tablet 0    famotidine (PEPCID) 20 MG Tab Take 1 Tablet by mouth 2 times a day. 60 Tablet 4    metroNIDAZOLE (FLAGYL) 500 MG Tab Take 1 Tablet by mouth every 8 hours. 100 Tablet 2    ondansetron (ZOFRAN ODT) 8 MG TABLET DISPERSIBLE Dissolve 1 Tablet by mouth every 6 hours as needed for Nausea/Vomiting. 10 Tablet 0    oxyCODONE CR (OXYCONTIN) 10 MG Tablet Extended Release 12 hour Abuse-Deterrent Take 1 Tablet by mouth every 8 hours for 15 days. 45 Tablet 0    oxyCODONE immediate-release (ROXICODONE) 5 MG Tab Take 1-2 Tablets by mouth every four hours as needed for Severe Pain for up to 15 days. 60 Tablet 0    senna-docusate (PERICOLACE OR SENOKOT S) 8.6-50 MG Tab Take 1 Tablet by mouth every day. 30 Tablet 0    imatinib (GLEEVEC) 100 MG tablet Take 200 mg by mouth every day. Pt takes 200 mg + 400 mg for a total dose of 600 mg daily      imatinib (GLEEVEC) 400 MG tablet Take 400 mg by mouth every day. Pt takes 200 mg + 400 mg for a total dose of 600 mg daily      sulfamethoxazole-trimethoprim (BACTRIM DS) 800-160 MG tablet Take 2 Tablets by mouth in the morning every Sat and Sun.      vitamin D3 (CHOLECALCIFEROL) 1000 Unit (25 mcg) Tab Take 1 Tablet by mouth every day.      therapeutic multivitamin-minerals (THERAGRAN-M) Tab Take 1 Tablet by mouth  every day.       No current facility-administered medications for this encounter.         PCP:  Nolberto Gee, Med Ass't

## 2023-03-02 NOTE — PROGRESS NOTES
Discharge from inpatient earlier this morning for PEG asparaginase.    Tolerating chemotherapy well.  Reviewed pain management plan and medications.    Please see Dr. Duenas's inpatient note and discharge for billing.

## 2023-03-06 ENCOUNTER — HOSPITAL ENCOUNTER (OUTPATIENT)
Dept: INFUSION CENTER | Facility: MEDICAL CENTER | Age: 23
End: 2023-03-06
Attending: PEDIATRICS
Payer: COMMERCIAL

## 2023-03-06 DIAGNOSIS — T45.1X5A ANEMIA ASSOCIATED WITH CHEMOTHERAPY: ICD-10-CM

## 2023-03-06 DIAGNOSIS — D64.81 ANEMIA ASSOCIATED WITH CHEMOTHERAPY: ICD-10-CM

## 2023-03-06 DIAGNOSIS — C91.Z0 B LYMPHOBLASTIC LEUKEMIA WITH T(9;22)(Q34;Q11.2);BCR-ABL1 (HCC): ICD-10-CM

## 2023-03-06 LAB
ABO GROUP BLD: NORMAL
BASOPHILS # BLD AUTO: 0 % (ref 0–1.8)
BASOPHILS # BLD: 0 K/UL (ref 0–0.12)
BLD GP AB SCN SERPL QL: NORMAL
EOSINOPHIL # BLD AUTO: 0 K/UL (ref 0–0.51)
EOSINOPHIL NFR BLD: 0 % (ref 0–6.9)
ERYTHROCYTE [DISTWIDTH] IN BLOOD BY AUTOMATED COUNT: 51 FL (ref 35.9–50)
HCT VFR BLD AUTO: 24.3 % (ref 42–52)
HGB BLD-MCNC: 7.9 G/DL (ref 14–18)
IMM GRANULOCYTES # BLD AUTO: 0.04 K/UL (ref 0–0.11)
IMM GRANULOCYTES NFR BLD AUTO: 2 % (ref 0–0.9)
LYMPHOCYTES # BLD AUTO: 0.26 K/UL (ref 1–4.8)
LYMPHOCYTES NFR BLD: 13.2 % (ref 22–41)
MCH RBC QN AUTO: 28.1 PG (ref 27–33)
MCHC RBC AUTO-ENTMCNC: 32.5 G/DL (ref 33.7–35.3)
MCV RBC AUTO: 86.5 FL (ref 81.4–97.8)
MONOCYTES # BLD AUTO: 0.12 K/UL (ref 0–0.85)
MONOCYTES NFR BLD AUTO: 6.1 % (ref 0–13.4)
NEUTROPHILS # BLD AUTO: 1.55 K/UL (ref 1.82–7.42)
NEUTROPHILS NFR BLD: 78.7 % (ref 44–72)
NRBC # BLD AUTO: 0 K/UL
NRBC BLD-RTO: 0 /100 WBC
PLATELET # BLD AUTO: 47 K/UL (ref 164–446)
PMV BLD AUTO: 11.8 FL (ref 9–12.9)
RBC # BLD AUTO: 2.81 M/UL (ref 4.7–6.1)
RH BLD: NORMAL
WBC # BLD AUTO: 2 K/UL (ref 4.8–10.8)

## 2023-03-06 PROCEDURE — 36415 COLL VENOUS BLD VENIPUNCTURE: CPT

## 2023-03-06 PROCEDURE — 700111 HCHG RX REV CODE 636 W/ 250 OVERRIDE (IP): Performed by: PEDIATRICS

## 2023-03-06 PROCEDURE — 86850 RBC ANTIBODY SCREEN: CPT

## 2023-03-06 PROCEDURE — A4212 NON CORING NEEDLE OR STYLET: HCPCS

## 2023-03-06 PROCEDURE — 86901 BLOOD TYPING SEROLOGIC RH(D): CPT

## 2023-03-06 PROCEDURE — 36591 DRAW BLOOD OFF VENOUS DEVICE: CPT

## 2023-03-06 PROCEDURE — 85025 COMPLETE CBC W/AUTO DIFF WBC: CPT

## 2023-03-06 PROCEDURE — 86900 BLOOD TYPING SEROLOGIC ABO: CPT

## 2023-03-06 RX ORDER — 0.9 % SODIUM CHLORIDE 0.9 %
20 VIAL (ML) INJECTION PRN
Status: CANCELLED | OUTPATIENT
Start: 2023-03-06

## 2023-03-06 RX ORDER — HEPARIN SODIUM,PORCINE 10 UNIT/ML
30 VIAL (ML) INTRAVENOUS PRN
Status: CANCELLED | OUTPATIENT
Start: 2023-03-06

## 2023-03-06 RX ORDER — HEPARIN SODIUM (PORCINE) LOCK FLUSH IV SOLN 100 UNIT/ML 100 UNIT/ML
500 SOLUTION INTRAVENOUS PRN
Status: DISCONTINUED | OUTPATIENT
Start: 2023-03-06 | End: 2023-03-07 | Stop reason: HOSPADM

## 2023-03-06 RX ORDER — DIPHENHYDRAMINE HYDROCHLORIDE 50 MG/ML
25 INJECTION INTRAMUSCULAR; INTRAVENOUS PRN
Status: CANCELLED | OUTPATIENT
Start: 2023-03-06

## 2023-03-06 RX ORDER — ACETAMINOPHEN 325 MG/1
650 TABLET ORAL PRN
Status: CANCELLED | OUTPATIENT
Start: 2023-03-06

## 2023-03-06 RX ORDER — HEPARIN SODIUM (PORCINE) LOCK FLUSH IV SOLN 100 UNIT/ML 100 UNIT/ML
500 SOLUTION INTRAVENOUS PRN
Status: CANCELLED | OUTPATIENT
Start: 2023-03-06

## 2023-03-06 RX ADMIN — HEPARIN 500 UNITS: 100 SYRINGE at 16:00

## 2023-03-07 ENCOUNTER — APPOINTMENT (OUTPATIENT)
Dept: INFUSION CENTER | Facility: MEDICAL CENTER | Age: 23
End: 2023-03-07
Attending: PEDIATRICS
Payer: COMMERCIAL

## 2023-03-07 NOTE — ADDENDUM NOTE
Encounter addended by: Caren Saravia R.N. on: 3/7/2023 8:35 AM   Actions taken: Charge Capture section accepted

## 2023-03-07 NOTE — PROGRESS NOTES
Patient with fatigue.  Here for labs to include CBC and COD.    Clinically well-appearing in clinic.  Pulse 80 while sitting.    Return to clinic tomorrow for blood if hemoglobin <8 g/dL

## 2023-03-07 NOTE — PROGRESS NOTES
Pt to [facility] for lab draw. Port accessed with 22 g 3/4in   and labs drawn from the port without difficulty / with 1 attempt.   Line flushed per protocol with Heparin and deaccessed. Pt tolerated well.  Pt to be seen 3/7/23 for blood transfusion.  Home with sister.

## 2023-03-09 ENCOUNTER — HOSPITAL ENCOUNTER (OUTPATIENT)
Dept: INFUSION CENTER | Facility: MEDICAL CENTER | Age: 23
End: 2023-03-09
Attending: PEDIATRICS
Payer: COMMERCIAL

## 2023-03-09 VITALS
BODY MASS INDEX: 20.05 KG/M2 | WEIGHT: 124.78 LBS | SYSTOLIC BLOOD PRESSURE: 113 MMHG | HEIGHT: 66 IN | TEMPERATURE: 97.8 F | OXYGEN SATURATION: 100 % | DIASTOLIC BLOOD PRESSURE: 76 MMHG | RESPIRATION RATE: 18 BRPM | HEART RATE: 100 BPM

## 2023-03-09 DIAGNOSIS — C91.01 ACUTE LYMPHOBLASTIC LEUKEMIA (ALL) IN REMISSION (HCC): ICD-10-CM

## 2023-03-09 DIAGNOSIS — T45.1X5A ANEMIA ASSOCIATED WITH CHEMOTHERAPY: ICD-10-CM

## 2023-03-09 DIAGNOSIS — C91.Z0 B LYMPHOBLASTIC LEUKEMIA WITH T(9;22)(Q34;Q11.2);BCR-ABL1 (HCC): ICD-10-CM

## 2023-03-09 DIAGNOSIS — D64.81 ANEMIA ASSOCIATED WITH CHEMOTHERAPY: ICD-10-CM

## 2023-03-09 DIAGNOSIS — Z51.11 ENCOUNTER FOR CHEMOTHERAPY MANAGEMENT: ICD-10-CM

## 2023-03-09 LAB
ALBUMIN SERPL BCP-MCNC: 3.4 G/DL (ref 3.2–4.9)
ALBUMIN/GLOB SERPL: 1.4 G/DL
ALP SERPL-CCNC: 119 U/L (ref 30–99)
ALT SERPL-CCNC: 14 U/L (ref 2–50)
ANION GAP SERPL CALC-SCNC: 11 MMOL/L (ref 7–16)
AST SERPL-CCNC: 24 U/L (ref 12–45)
BARCODED ABORH UBTYP: 5100
BARCODED PRD CODE UBPRD: NORMAL
BARCODED UNIT NUM UBUNT: NORMAL
BASOPHILS # BLD AUTO: 0.3 % (ref 0–1.8)
BASOPHILS # BLD: 0.01 K/UL (ref 0–0.12)
BILIRUB CONJ SERPL-MCNC: <0.2 MG/DL (ref 0.1–0.5)
BILIRUB INDIRECT SERPL-MCNC: NORMAL MG/DL (ref 0–1)
BILIRUB SERPL-MCNC: 0.5 MG/DL (ref 0.1–1.5)
BUN SERPL-MCNC: 7 MG/DL (ref 8–22)
CALCIUM ALBUM COR SERPL-MCNC: 9.2 MG/DL (ref 8.5–10.5)
CALCIUM SERPL-MCNC: 8.7 MG/DL (ref 8.5–10.5)
CHLORIDE SERPL-SCNC: 105 MMOL/L (ref 96–112)
CO2 SERPL-SCNC: 23 MMOL/L (ref 20–33)
COMPONENT R 8504R: NORMAL
CREAT SERPL-MCNC: 0.4 MG/DL (ref 0.5–1.4)
EOSINOPHIL # BLD AUTO: 0 K/UL (ref 0–0.51)
EOSINOPHIL NFR BLD: 0 % (ref 0–6.9)
ERYTHROCYTE [DISTWIDTH] IN BLOOD BY AUTOMATED COUNT: 51.6 FL (ref 35.9–50)
GFR SERPLBLD CREATININE-BSD FMLA CKD-EPI: 159 ML/MIN/1.73 M 2
GLOBULIN SER CALC-MCNC: 2.4 G/DL (ref 1.9–3.5)
GLUCOSE SERPL-MCNC: 121 MG/DL (ref 65–99)
HCT VFR BLD AUTO: 26.3 % (ref 42–52)
HGB BLD-MCNC: 8.5 G/DL (ref 14–18)
IMM GRANULOCYTES # BLD AUTO: 0.02 K/UL (ref 0–0.11)
IMM GRANULOCYTES NFR BLD AUTO: 0.7 % (ref 0–0.9)
LYMPHOCYTES # BLD AUTO: 0.32 K/UL (ref 1–4.8)
LYMPHOCYTES NFR BLD: 11 % (ref 22–41)
MCH RBC QN AUTO: 28.1 PG (ref 27–33)
MCHC RBC AUTO-ENTMCNC: 32.3 G/DL (ref 33.7–35.3)
MCV RBC AUTO: 87.1 FL (ref 81.4–97.8)
MONOCYTES # BLD AUTO: 0.28 K/UL (ref 0–0.85)
MONOCYTES NFR BLD AUTO: 9.6 % (ref 0–13.4)
NEUTROPHILS # BLD AUTO: 2.28 K/UL (ref 1.82–7.42)
NEUTROPHILS NFR BLD: 78.4 % (ref 44–72)
NRBC # BLD AUTO: 0.04 K/UL
NRBC BLD-RTO: 1.4 /100 WBC
PLATELET # BLD AUTO: 43 K/UL (ref 164–446)
PMV BLD AUTO: 12.4 FL (ref 9–12.9)
POTASSIUM SERPL-SCNC: 3.2 MMOL/L (ref 3.6–5.5)
PRODUCT TYPE UPROD: NORMAL
PROT SERPL-MCNC: 5.8 G/DL (ref 6–8.2)
RBC # BLD AUTO: 3.02 M/UL (ref 4.7–6.1)
SODIUM SERPL-SCNC: 139 MMOL/L (ref 135–145)
UNIT STATUS USTAT: NORMAL
WBC # BLD AUTO: 2.9 K/UL (ref 4.8–10.8)

## 2023-03-09 PROCEDURE — 82657 ENZYME CELL ACTIVITY: CPT | Mod: 91

## 2023-03-09 PROCEDURE — P9016 RBC LEUKOCYTES REDUCED: HCPCS

## 2023-03-09 PROCEDURE — 700111 HCHG RX REV CODE 636 W/ 250 OVERRIDE (IP): Performed by: PEDIATRICS

## 2023-03-09 PROCEDURE — A4212 NON CORING NEEDLE OR STYLET: HCPCS

## 2023-03-09 PROCEDURE — 80053 COMPREHEN METABOLIC PANEL: CPT

## 2023-03-09 PROCEDURE — 85025 COMPLETE CBC W/AUTO DIFF WBC: CPT

## 2023-03-09 PROCEDURE — 36430 TRANSFUSION BLD/BLD COMPNT: CPT

## 2023-03-09 PROCEDURE — 86945 BLOOD PRODUCT/IRRADIATION: CPT

## 2023-03-09 PROCEDURE — 96374 THER/PROPH/DIAG INJ IV PUSH: CPT

## 2023-03-09 PROCEDURE — 99214 OFFICE O/P EST MOD 30 MIN: CPT | Performed by: PEDIATRICS

## 2023-03-09 PROCEDURE — 86923 COMPATIBILITY TEST ELECTRIC: CPT

## 2023-03-09 PROCEDURE — 306780 HCHG STAT FOR TRANSFUSION PER CASE

## 2023-03-09 PROCEDURE — 36591 DRAW BLOOD OFF VENOUS DEVICE: CPT

## 2023-03-09 PROCEDURE — 82248 BILIRUBIN DIRECT: CPT

## 2023-03-09 RX ORDER — LORAZEPAM 2 MG/ML
1 CONCENTRATE ORAL EVERY 6 HOURS PRN
Status: CANCELLED | OUTPATIENT
Start: 2023-03-13

## 2023-03-09 RX ORDER — ONDANSETRON 2 MG/ML
8 INJECTION INTRAMUSCULAR; INTRAVENOUS ONCE
Status: CANCELLED | OUTPATIENT
Start: 2023-03-09

## 2023-03-09 RX ORDER — 0.9 % SODIUM CHLORIDE 0.9 %
20 VIAL (ML) INJECTION PRN
Status: CANCELLED | OUTPATIENT
Start: 2023-03-09

## 2023-03-09 RX ORDER — LIDOCAINE AND PRILOCAINE 25; 25 MG/G; MG/G
CREAM TOPICAL PRN
Status: CANCELLED | OUTPATIENT
Start: 2023-04-15

## 2023-03-09 RX ORDER — ONDANSETRON 2 MG/ML
8 INJECTION INTRAMUSCULAR; INTRAVENOUS EVERY 8 HOURS PRN
Status: CANCELLED | OUTPATIENT
Start: 2023-03-13

## 2023-03-09 RX ORDER — SODIUM CHLORIDE 9 MG/ML
500 INJECTION, SOLUTION INTRAVENOUS CONTINUOUS
Status: CANCELLED | OUTPATIENT
Start: 2023-04-11

## 2023-03-09 RX ORDER — HEPARIN SODIUM (PORCINE) LOCK FLUSH IV SOLN 100 UNIT/ML 100 UNIT/ML
500 SOLUTION INTRAVENOUS PRN
Status: CANCELLED | OUTPATIENT
Start: 2023-03-09

## 2023-03-09 RX ORDER — LIDOCAINE AND PRILOCAINE 25; 25 MG/G; MG/G
CREAM TOPICAL PRN
Status: CANCELLED | OUTPATIENT
Start: 2023-04-11

## 2023-03-09 RX ORDER — SODIUM CHLORIDE 9 MG/ML
500 INJECTION, SOLUTION INTRAVENOUS CONTINUOUS
Status: CANCELLED | OUTPATIENT
Start: 2023-03-13

## 2023-03-09 RX ORDER — ONDANSETRON 2 MG/ML
8 INJECTION INTRAMUSCULAR; INTRAVENOUS EVERY 8 HOURS PRN
Status: CANCELLED | OUTPATIENT
Start: 2023-04-11

## 2023-03-09 RX ORDER — HEPARIN SODIUM,PORCINE 10 UNIT/ML
30 VIAL (ML) INTRAVENOUS PRN
Status: CANCELLED | OUTPATIENT
Start: 2023-03-09

## 2023-03-09 RX ORDER — ONDANSETRON 2 MG/ML
8 INJECTION INTRAMUSCULAR; INTRAVENOUS ONCE
Status: CANCELLED | OUTPATIENT
Start: 2023-03-23

## 2023-03-09 RX ORDER — ONDANSETRON 2 MG/ML
8 INJECTION INTRAMUSCULAR; INTRAVENOUS ONCE
Status: CANCELLED | OUTPATIENT
Start: 2023-03-13

## 2023-03-09 RX ORDER — LORAZEPAM 2 MG/ML
1 CONCENTRATE ORAL EVERY 6 HOURS PRN
Status: CANCELLED | OUTPATIENT
Start: 2023-03-23

## 2023-03-09 RX ORDER — ONDANSETRON 2 MG/ML
8 INJECTION INTRAMUSCULAR; INTRAVENOUS ONCE
Status: COMPLETED | OUTPATIENT
Start: 2023-03-09 | End: 2023-03-09

## 2023-03-09 RX ORDER — HEPARIN SODIUM (PORCINE) LOCK FLUSH IV SOLN 100 UNIT/ML 100 UNIT/ML
500 SOLUTION INTRAVENOUS PRN
Status: DISCONTINUED | OUTPATIENT
Start: 2023-03-09 | End: 2023-03-10 | Stop reason: HOSPADM

## 2023-03-09 RX ORDER — ACETAMINOPHEN 325 MG/1
650 TABLET ORAL PRN
Status: CANCELLED | OUTPATIENT
Start: 2023-03-09

## 2023-03-09 RX ORDER — DIPHENHYDRAMINE HYDROCHLORIDE 50 MG/ML
25 INJECTION INTRAMUSCULAR; INTRAVENOUS PRN
Status: CANCELLED | OUTPATIENT
Start: 2023-03-09

## 2023-03-09 RX ORDER — DIPHENHYDRAMINE HYDROCHLORIDE 50 MG/ML
50 INJECTION INTRAMUSCULAR; INTRAVENOUS ONCE
Status: CANCELLED | OUTPATIENT
Start: 2023-04-15 | End: 2023-03-20

## 2023-03-09 RX ORDER — LIDOCAINE AND PRILOCAINE 25; 25 MG/G; MG/G
CREAM TOPICAL PRN
Status: CANCELLED | OUTPATIENT
Start: 2023-03-13

## 2023-03-09 RX ADMIN — ONDANSETRON 8 MG: 2 INJECTION INTRAMUSCULAR; INTRAVENOUS at 09:51

## 2023-03-09 RX ADMIN — HEPARIN 500 UNITS: 100 SYRINGE at 12:03

## 2023-03-09 ASSESSMENT — FIBROSIS 4 INDEX: FIB4 SCORE: 2.56

## 2023-03-09 NOTE — PROGRESS NOTES
PT to [facility] for blood transfusion and chemotherapy accompanied by Mother.  Afebrile.  VSS. Port accessed using a 22g 3/4 inch trevino needle with 1 attempt and labs drawn.   PT tolerated well.     Per Dr. Faye ok to transfuse with 1 unit for hemoglobin of 8.5.    Chemotherapy to be delayed 4 days due to platelet count of 43 per Dr. Faye.     PRBC / Platelet: Donor #  [id number] B started at 0940.      Total volume infused: 320     Transfusion completed at 1154.  Pt tolerated well. Port flushed per orders (see MAR) and de-accessed.  Next appointment scheduled on 3/13/23.

## 2023-03-09 NOTE — PROGRESS NOTES
"Pharmacy Chemotherapy Calculations Note:  Treatment deferred for low plts  Dx: B-ALL     Previous treatment: IM D2 on 3/1/23     Protocol: Interim Maintenance per Arm B of [id number] [facility] ID number: [id number]            /75   Pulse 97   Temp 37 °C (98.6 °F)   Resp 18   Ht 1.668 m (5' 5.67\")   Wt 56.6 kg (124 lb 12.5 oz)   SpO2 99%   BMI 20.34 kg/m²  Body surface area is 1.62 meters squared.    Labs from 3/9/23 reviewed - plts 43k, defer 4 treatment days per Dr Neyda Bryant, PharmD, BCOP            "

## 2023-03-13 ENCOUNTER — HOSPITAL ENCOUNTER (OUTPATIENT)
Dept: INFUSION CENTER | Facility: MEDICAL CENTER | Age: 23
End: 2023-03-13
Attending: PEDIATRICS
Payer: COMMERCIAL

## 2023-03-13 VITALS
TEMPERATURE: 98.2 F | WEIGHT: 126.1 LBS | DIASTOLIC BLOOD PRESSURE: 79 MMHG | HEIGHT: 65 IN | HEART RATE: 111 BPM | BODY MASS INDEX: 21.01 KG/M2 | OXYGEN SATURATION: 99 % | RESPIRATION RATE: 18 BRPM | SYSTOLIC BLOOD PRESSURE: 115 MMHG

## 2023-03-13 DIAGNOSIS — Z51.11 ENCOUNTER FOR CHEMOTHERAPY MANAGEMENT: ICD-10-CM

## 2023-03-13 DIAGNOSIS — C91.01 ACUTE LYMPHOBLASTIC LEUKEMIA (ALL) IN REMISSION (HCC): ICD-10-CM

## 2023-03-13 DIAGNOSIS — C91.Z0 B LYMPHOBLASTIC LEUKEMIA WITH T(9;22)(Q34;Q11.2);BCR-ABL1 (HCC): ICD-10-CM

## 2023-03-13 LAB
ALBUMIN SERPL BCP-MCNC: 3.1 G/DL (ref 3.2–4.9)
ALBUMIN/GLOB SERPL: 1.3 G/DL
ALP SERPL-CCNC: 117 U/L (ref 30–99)
ALT SERPL-CCNC: 13 U/L (ref 2–50)
ANION GAP SERPL CALC-SCNC: 11 MMOL/L (ref 7–16)
AST SERPL-CCNC: 21 U/L (ref 12–45)
BASOPHILS # BLD AUTO: 0 % (ref 0–1.8)
BASOPHILS # BLD: 0 K/UL (ref 0–0.12)
BILIRUB CONJ SERPL-MCNC: <0.2 MG/DL (ref 0.1–0.5)
BILIRUB INDIRECT SERPL-MCNC: NORMAL MG/DL (ref 0–1)
BILIRUB SERPL-MCNC: 0.5 MG/DL (ref 0.1–1.5)
BUN SERPL-MCNC: 8 MG/DL (ref 8–22)
CALCIUM ALBUM COR SERPL-MCNC: 9 MG/DL (ref 8.5–10.5)
CALCIUM SERPL-MCNC: 8.3 MG/DL (ref 8.5–10.5)
CHLORIDE SERPL-SCNC: 105 MMOL/L (ref 96–112)
CO2 SERPL-SCNC: 22 MMOL/L (ref 20–33)
CREAT SERPL-MCNC: 0.57 MG/DL (ref 0.5–1.4)
EOSINOPHIL # BLD AUTO: 0 K/UL (ref 0–0.51)
EOSINOPHIL NFR BLD: 0 % (ref 0–6.9)
ERYTHROCYTE [DISTWIDTH] IN BLOOD BY AUTOMATED COUNT: 51.7 FL (ref 35.9–50)
GFR SERPLBLD CREATININE-BSD FMLA CKD-EPI: 143 ML/MIN/1.73 M 2
GLOBULIN SER CALC-MCNC: 2.3 G/DL (ref 1.9–3.5)
GLUCOSE SERPL-MCNC: 91 MG/DL (ref 65–99)
HCT VFR BLD AUTO: 26.8 % (ref 42–52)
HGB BLD-MCNC: 8.8 G/DL (ref 14–18)
IMM GRANULOCYTES # BLD AUTO: 0.03 K/UL (ref 0–0.11)
IMM GRANULOCYTES NFR BLD AUTO: 1.5 % (ref 0–0.9)
LYMPHOCYTES # BLD AUTO: 0.54 K/UL (ref 1–4.8)
LYMPHOCYTES NFR BLD: 26.7 % (ref 22–41)
MCH RBC QN AUTO: 28.4 PG (ref 27–33)
MCHC RBC AUTO-ENTMCNC: 32.8 G/DL (ref 33.7–35.3)
MCV RBC AUTO: 86.5 FL (ref 81.4–97.8)
MONOCYTES # BLD AUTO: 0.29 K/UL (ref 0–0.85)
MONOCYTES NFR BLD AUTO: 14.4 % (ref 0–13.4)
NEUTROPHILS # BLD AUTO: 1.16 K/UL (ref 1.82–7.42)
NEUTROPHILS NFR BLD: 57.4 % (ref 44–72)
NRBC # BLD AUTO: 0.03 K/UL
NRBC BLD-RTO: 1.5 /100 WBC
PLATELET # BLD AUTO: 43 K/UL (ref 164–446)
PMV BLD AUTO: 10.9 FL (ref 9–12.9)
POTASSIUM SERPL-SCNC: 3.6 MMOL/L (ref 3.6–5.5)
PROT SERPL-MCNC: 5.4 G/DL (ref 6–8.2)
RBC # BLD AUTO: 3.1 M/UL (ref 4.7–6.1)
SODIUM SERPL-SCNC: 138 MMOL/L (ref 135–145)
WBC # BLD AUTO: 2 K/UL (ref 4.8–10.8)

## 2023-03-13 PROCEDURE — 99214 OFFICE O/P EST MOD 30 MIN: CPT | Performed by: PEDIATRICS

## 2023-03-13 PROCEDURE — A4212 NON CORING NEEDLE OR STYLET: HCPCS

## 2023-03-13 PROCEDURE — 82248 BILIRUBIN DIRECT: CPT

## 2023-03-13 PROCEDURE — 36591 DRAW BLOOD OFF VENOUS DEVICE: CPT

## 2023-03-13 PROCEDURE — 85025 COMPLETE CBC W/AUTO DIFF WBC: CPT

## 2023-03-13 PROCEDURE — 80053 COMPREHEN METABOLIC PANEL: CPT

## 2023-03-13 PROCEDURE — 82657 ENZYME CELL ACTIVITY: CPT

## 2023-03-13 PROCEDURE — 96375 TX/PRO/DX INJ NEW DRUG ADDON: CPT

## 2023-03-13 PROCEDURE — 700105 HCHG RX REV CODE 258: Performed by: PEDIATRICS

## 2023-03-13 PROCEDURE — 96409 CHEMO IV PUSH SNGL DRUG: CPT

## 2023-03-13 PROCEDURE — 84433 ASY THIOPURIN S-MTHYLTRNSFRS: CPT

## 2023-03-13 PROCEDURE — 700111 HCHG RX REV CODE 636 W/ 250 OVERRIDE (IP): Performed by: PEDIATRICS

## 2023-03-13 RX ORDER — ONDANSETRON 2 MG/ML
8 INJECTION INTRAMUSCULAR; INTRAVENOUS ONCE
Status: COMPLETED | OUTPATIENT
Start: 2023-03-13 | End: 2023-03-13

## 2023-03-13 RX ORDER — HEPARIN SODIUM,PORCINE 10 UNIT/ML
30 VIAL (ML) INTRAVENOUS PRN
Status: CANCELLED | OUTPATIENT
Start: 2023-03-13

## 2023-03-13 RX ORDER — 0.9 % SODIUM CHLORIDE 0.9 %
20 VIAL (ML) INJECTION PRN
Status: CANCELLED | OUTPATIENT
Start: 2023-03-13

## 2023-03-13 RX ORDER — HEPARIN SODIUM (PORCINE) LOCK FLUSH IV SOLN 100 UNIT/ML 100 UNIT/ML
500 SOLUTION INTRAVENOUS PRN
Status: CANCELLED | OUTPATIENT
Start: 2023-03-13

## 2023-03-13 RX ORDER — HEPARIN SODIUM (PORCINE) LOCK FLUSH IV SOLN 100 UNIT/ML 100 UNIT/ML
500 SOLUTION INTRAVENOUS PRN
Status: DISCONTINUED | OUTPATIENT
Start: 2023-03-13 | End: 2023-03-14 | Stop reason: HOSPADM

## 2023-03-13 RX ADMIN — HEPARIN 500 UNITS: 100 SYRINGE at 13:30

## 2023-03-13 RX ADMIN — VINCRISTINE SULFATE 2 MG: 1 INJECTION, SOLUTION INTRAVENOUS at 13:20

## 2023-03-13 RX ADMIN — ONDANSETRON 8 MG: 2 INJECTION INTRAMUSCULAR; INTRAVENOUS at 12:32

## 2023-03-13 ASSESSMENT — FIBROSIS 4 INDEX: FIB4 SCORE: 3.13

## 2023-03-13 NOTE — PROGRESS NOTES
Anticoagulation Summary  As of 8/22/2019    INR goal:   2.0-3.0   TTR:   74.7 % (4.3 y)   INR used for dosing:   3.10! (8/22/2019)   Warfarin maintenance plan:   1.5 mg (3 mg x 0.5) every Thu; 3 mg (3 mg x 1) all other days   Weekly warfarin total:   19.5 mg   Plan last modified:   Azra Mcgraw A.P.NChioma (8/22/2019)   Next INR check:   9/19/2019   Target end date:   Indefinite    Indications    Paroxysmal atrial fibrillation (HCC) [I48.0]  History of DVT (deep vein thrombosis) [Z86.718]             Anticoagulation Episode Summary     INR check location:   Anticoagulation Clinic    Preferred lab:   [facility]    Send INR reminders to:       Comments:   Fax progress note to Dr. López [phone]      Anticoagulation Care Providers     Provider Role Specialty Phone number    Kyle López D.O. Referring Cardiology [phone]    [facility] Anticoagulation Services Responsible  [phone]        Anticoagulation Patient Findings      HPI:  Werner Acuña seen in clinic today for follow up on anticoagulation therapy in the presence of DVT, AF h x.   Denies any changes to current medical/health status since last appointment.   Denies any medication or diet changes.   No current symptoms of bleeding or thrombosis reported.    A/P:   INR supratherapeutic.   INR trending high. Will decrease regimen further.   BP recorded in vitals.    Follow up appointment in 4 week(s) per pt's preference.    Next Appointment: Thursday, Sept 19, 2019 at 10:00 am.     Azra NOONAN                     Pt to [facility] for lab draw, doctors office visit, and chemotherapy administration.      Afebrile.  VSS.  Awake and alert in no acute distress.      Office visit with Dr. Faye completed.     Port accessed using a 22g 3/4 inch trevino needle with 1 attempt.  Labs drawn from the port without difficulty.   Pt tolerated well.      ANC 1.16, platelets 43- per MD and protocol, just vincristine to be administered today, methotrexate held.    Chemotherapy dosage calculated independently by Tammie Covington RN and Cherrie Urbano RN and compared to road map for protocol [id number].  Calculations within 10% of written order.  Lab results reviewed.      Premedications and chemo given as ordered, see MAR.  Blood return verified prior to, during, and after chemotherapy infusion.  See Chemotherapy flowsheet.  PT tolerated well.  No side effects or complications noted.  Port flushed per orders (see MAR) and de-accessed after completion. PT home with mother.  Will return for next visit on 3/20/23.

## 2023-03-13 NOTE — PROGRESS NOTES
"Pharmacy chemotherapy verification    Patient Name: Tomas Jean-Baptiste   Dx: pH+ B-Cell ALL         Protocol: Capizzi MTX IM (On Study Arm B, ID number: [id number])         Allergies:  Amoxicillin     /78   Pulse (!) 127   Temp 36.9 °C (98.5 °F) (Temporal)   Resp 20   Ht 1.64 m (5' 4.57\")   Wt 57.2 kg (126 lb 1.7 oz)   SpO2 96%   BMI 21.27 kg/m²  Body surface area is 1.61 meters squared.         Drug Order   (Drug name, dose, route, IV Fluid & volume, frequency, number of doses) Cycle: IM D11 - delayed d/t counts   Previous treatment: IM D2 3/1/23   Medication = methotrexate  Base Dose= 12mg fixed dose  Calc Dose: n/a  Final Dose = 12mg  Route = INTRATHECAL  Fluid & Volume = PF NS 6mL  Admin Duration = IT per MD   Day 1 and 31       No calculation required  ok to treat with final dose   Medication = vincristine  Base Dose= 1.5 mg/m2  Calc Dose: Base Dose x 1.61 m2 = 2.415  Final Dose = 2 mg (MAX)   Route = IV  Fluid & Volume = NS 25 mL  Admin Duration = Over 5-10min   Days 1,11,21,31,and 41       <10% difference, ok to treat with max final dose     Days 1,11,21,31,and 41        Deferred d/t counts    Medication = pegaspargase  Base Dose= 2500 units/m2  Calc Dose: Base Dose x 1.66 m2 = 4150 units  Final Dose = 4323.25 units  Route = IV  Fluid & Volume =  mL  Admin Duration = Over 2 hours   Days 2 and 22       <10% difference, ok to treat with final dose   By my signature below, I confirm this process was performed independently with the BSA and all final chemotherapy dosing calculations congruent. I have reviewed the above chemotherapy order and that my calculation of the final dose and BSA (when applicable) corroborate those calculations of the  pharmacist. Discrepancies of 10% or greater in the written dose have been addressed and documented within the [facility] Progress notes.    Xochilt DriverD            "

## 2023-03-13 NOTE — PROGRESS NOTES
"Pharmacy Chemotherapy Calculations Note:    Dx: B-ALL  Cycle:  Interim Maintenance I Day 11, delayed 4 days for low plts   Previous treatment: IM D2 on 3/1/23     Protocol: Interim Maintenance per Arm B of [id number] [place]: [id number]            /78   Pulse (!) 127   Temp 36.9 °C (98.5 °F) (Temporal)   Resp 20   Ht 1.64 m (5' 4.57\")   Wt 57.2 kg (126 lb 1.7 oz)   SpO2 96%   BMI 21.27 kg/m²  Body surface area is 1.61 meters squared.    Labs from 3/13/23 reviewed - plt remain < 50k. Giving VCR and holding mtx today per protocol guidelines, d/w Dr Faye       Vincristine 1.5 mg/m² (max 2 mg) x 1.61 m² = 2.4 mg   Max 2 mg, ok for final dose = 2 mg IV          Judy Bryant, PharmD, BCOP            "

## 2023-03-14 LAB
FUNGUS SPEC CULT: NORMAL
FUNGUS SPEC CULT: NORMAL
FUNGUS SPEC FUNGUS STN: NORMAL
FUNGUS SPEC FUNGUS STN: NORMAL
SIGNIFICANT IND 70042: NORMAL
SIGNIFICANT IND 70042: NORMAL
SITE SITE: NORMAL
SITE SITE: NORMAL
SOURCE SOURCE: NORMAL
SOURCE SOURCE: NORMAL

## 2023-03-16 LAB
MISCELLANEOUS LAB RESULT MISCLAB: NORMAL
MISCELLANEOUS LAB RESULT MISCLAB: NORMAL

## 2023-03-17 NOTE — CARE PLAN
The patient is Stable - Low risk of patient condition declining or worsening    Shift Goals  Clinical Goals: monitor MTX, prn nausea meds  Patient Goals: decreased nausea  Family Goals: no family present    Progress made toward(s) clinical / shift goals:    Problem: Discharge Barriers/Planning  Goal: Patient's continuum of care needs are met  Note: Discharge instructions, Rx's and follow up appointments discussed with patient, verbalized understanding. Meds delivered to bedside and verified by RN and MD.        Pt instructed to follow up in clinic in 2 weeks for next maintenance chemo.       
The patient is Watcher - Medium risk of patient condition declining or worsening    Shift Goals  Clinical Goals: Monitor Methotrexate levels and urine, minimal side effects, VSS  Patient Goals: Rest, decreased nausea  Family Goals: No family at bedside    Progress made toward(s) clinical / shift goals:  Progressing    Problem: Fluid Volume  Goal: Fluid volume balance will be maintained  Outcome: Progressing     Problem: Nutrition - Standard  Goal: Patient's nutritional and fluid intake will be adequate or improve  Outcome: Progressing       
The patient is Watcher - Medium risk of patient condition declining or worsening    Shift Goals  Clinical Goals: Monitor labs, Control nausea  Patient Goals: Decrease nausea, rest  Family Goals: Updates on plan of care    Progress made toward(s) clinical / shift goals:    Problem: Knowledge Deficit - Standard  Goal: Patient and family/care givers will demonstrate understanding of plan of care, disease process/condition, diagnostic tests and medications  Outcome: Progressing  Note: Patient and mother updated on plan of care, all questions answered at this time.     Problem: Fluid Volume  Goal: Fluid volume balance will be maintained  Outcome: Progressing     Problem: Nutrition - Standard  Goal: Patient's nutritional and fluid intake will be adequate or improve  Outcome: Progressing  Note: Patient able to eat a few bites of hamburger for dinner. Patient medicated PRN and with scheduled zofran for nausea.        Patient is not progressing towards the following goals:      
Lorraine VALADEZ

## 2023-03-20 ENCOUNTER — HOSPITAL ENCOUNTER (OUTPATIENT)
Dept: INFUSION CENTER | Facility: MEDICAL CENTER | Age: 23
End: 2023-03-20
Attending: PEDIATRICS
Payer: COMMERCIAL

## 2023-03-20 VITALS
TEMPERATURE: 99.1 F | DIASTOLIC BLOOD PRESSURE: 69 MMHG | SYSTOLIC BLOOD PRESSURE: 112 MMHG | WEIGHT: 122.14 LBS | RESPIRATION RATE: 18 BRPM | HEIGHT: 65 IN | HEART RATE: 107 BPM | OXYGEN SATURATION: 100 % | BODY MASS INDEX: 20.35 KG/M2

## 2023-03-20 DIAGNOSIS — D64.81 ANEMIA ASSOCIATED WITH CHEMOTHERAPY: ICD-10-CM

## 2023-03-20 DIAGNOSIS — C91.01 ACUTE LYMPHOBLASTIC LEUKEMIA (ALL) IN REMISSION (HCC): ICD-10-CM

## 2023-03-20 DIAGNOSIS — C91.Z0 B LYMPHOBLASTIC LEUKEMIA WITH T(9;22)(Q34;Q11.2);BCR-ABL1 (HCC): ICD-10-CM

## 2023-03-20 DIAGNOSIS — T45.1X5A ANEMIA ASSOCIATED WITH CHEMOTHERAPY: ICD-10-CM

## 2023-03-20 DIAGNOSIS — Z51.11 ENCOUNTER FOR CHEMOTHERAPY MANAGEMENT: ICD-10-CM

## 2023-03-20 LAB
ABO GROUP BLD: NORMAL
ALBUMIN SERPL BCP-MCNC: 3.6 G/DL (ref 3.2–4.9)
ALBUMIN/GLOB SERPL: 1.6 G/DL
ALP SERPL-CCNC: 101 U/L (ref 30–99)
ALT SERPL-CCNC: 15 U/L (ref 2–50)
ANION GAP SERPL CALC-SCNC: 12 MMOL/L (ref 7–16)
ANISOCYTOSIS BLD QL SMEAR: ABNORMAL
AST SERPL-CCNC: 25 U/L (ref 12–45)
BARCODED ABORH UBTYP: 9500
BARCODED PRD CODE UBPRD: NORMAL
BARCODED UNIT NUM UBUNT: NORMAL
BASOPHILS # BLD AUTO: 0.9 % (ref 0–1.8)
BASOPHILS # BLD: 0.01 K/UL (ref 0–0.12)
BILIRUB CONJ SERPL-MCNC: <0.2 MG/DL (ref 0.1–0.5)
BILIRUB INDIRECT SERPL-MCNC: NORMAL MG/DL (ref 0–1)
BILIRUB SERPL-MCNC: 0.5 MG/DL (ref 0.1–1.5)
BLD GP AB SCN SERPL QL: NORMAL
BUN SERPL-MCNC: 9 MG/DL (ref 8–22)
CALCIUM ALBUM COR SERPL-MCNC: 8.6 MG/DL (ref 8.5–10.5)
CALCIUM SERPL-MCNC: 8.3 MG/DL (ref 8.5–10.5)
CHLORIDE SERPL-SCNC: 101 MMOL/L (ref 96–112)
CO2 SERPL-SCNC: 21 MMOL/L (ref 20–33)
COMPONENT R 8504R: NORMAL
CREAT SERPL-MCNC: 0.58 MG/DL (ref 0.5–1.4)
EOSINOPHIL # BLD AUTO: 0 K/UL (ref 0–0.51)
EOSINOPHIL NFR BLD: 0 % (ref 0–6.9)
ERYTHROCYTE [DISTWIDTH] IN BLOOD BY AUTOMATED COUNT: 55.5 FL (ref 35.9–50)
GFR SERPLBLD CREATININE-BSD FMLA CKD-EPI: 142 ML/MIN/1.73 M 2
GLOBULIN SER CALC-MCNC: 2.3 G/DL (ref 1.9–3.5)
GLUCOSE SERPL-MCNC: 134 MG/DL (ref 65–99)
HCT VFR BLD AUTO: 25.3 % (ref 42–52)
HGB BLD-MCNC: 8.2 G/DL (ref 14–18)
HYPOCHROMIA BLD QL SMEAR: ABNORMAL
LYMPHOCYTES # BLD AUTO: 0.47 K/UL (ref 1–4.8)
LYMPHOCYTES NFR BLD: 29.3 % (ref 22–41)
MACROCYTES BLD QL SMEAR: ABNORMAL
MANUAL DIFF BLD: NORMAL
MCH RBC QN AUTO: 28.5 PG (ref 27–33)
MCHC RBC AUTO-ENTMCNC: 32.4 G/DL (ref 33.7–35.3)
MCV RBC AUTO: 87.8 FL (ref 81.4–97.8)
MICROCYTES BLD QL SMEAR: ABNORMAL
MONOCYTES # BLD AUTO: 0.04 K/UL (ref 0–0.85)
MONOCYTES NFR BLD AUTO: 2.6 % (ref 0–13.4)
MORPHOLOGY BLD-IMP: NORMAL
NEUTROPHILS # BLD AUTO: 1.08 K/UL (ref 1.82–7.42)
NEUTROPHILS NFR BLD: 67.2 % (ref 44–72)
NRBC # BLD AUTO: 0 K/UL
NRBC BLD-RTO: 0 /100 WBC
PLATELET # BLD AUTO: 40 K/UL (ref 164–446)
PLATELET BLD QL SMEAR: NORMAL
PMV BLD AUTO: 11.3 FL (ref 9–12.9)
POTASSIUM SERPL-SCNC: 3.6 MMOL/L (ref 3.6–5.5)
PRODUCT TYPE UPROD: NORMAL
PROT SERPL-MCNC: 5.9 G/DL (ref 6–8.2)
RBC # BLD AUTO: 2.88 M/UL (ref 4.7–6.1)
RBC BLD AUTO: PRESENT
RH BLD: NORMAL
SODIUM SERPL-SCNC: 134 MMOL/L (ref 135–145)
UNIT STATUS USTAT: NORMAL
WBC # BLD AUTO: 1.6 K/UL (ref 4.8–10.8)

## 2023-03-20 PROCEDURE — 86945 BLOOD PRODUCT/IRRADIATION: CPT

## 2023-03-20 PROCEDURE — 80053 COMPREHEN METABOLIC PANEL: CPT

## 2023-03-20 PROCEDURE — 86850 RBC ANTIBODY SCREEN: CPT

## 2023-03-20 PROCEDURE — 86901 BLOOD TYPING SEROLOGIC RH(D): CPT

## 2023-03-20 PROCEDURE — 99213 OFFICE O/P EST LOW 20 MIN: CPT | Performed by: PEDIATRICS

## 2023-03-20 PROCEDURE — 36591 DRAW BLOOD OFF VENOUS DEVICE: CPT

## 2023-03-20 PROCEDURE — 86923 COMPATIBILITY TEST ELECTRIC: CPT

## 2023-03-20 PROCEDURE — 96374 THER/PROPH/DIAG INJ IV PUSH: CPT

## 2023-03-20 PROCEDURE — A4212 NON CORING NEEDLE OR STYLET: HCPCS

## 2023-03-20 PROCEDURE — 700111 HCHG RX REV CODE 636 W/ 250 OVERRIDE (IP): Performed by: PEDIATRICS

## 2023-03-20 PROCEDURE — 306780 HCHG STAT FOR TRANSFUSION PER CASE

## 2023-03-20 PROCEDURE — A9270 NON-COVERED ITEM OR SERVICE: HCPCS | Performed by: PEDIATRICS

## 2023-03-20 PROCEDURE — 36430 TRANSFUSION BLD/BLD COMPNT: CPT

## 2023-03-20 PROCEDURE — 82248 BILIRUBIN DIRECT: CPT

## 2023-03-20 PROCEDURE — 85007 BL SMEAR W/DIFF WBC COUNT: CPT

## 2023-03-20 PROCEDURE — 86900 BLOOD TYPING SEROLOGIC ABO: CPT

## 2023-03-20 PROCEDURE — P9016 RBC LEUKOCYTES REDUCED: HCPCS

## 2023-03-20 PROCEDURE — 85025 COMPLETE CBC W/AUTO DIFF WBC: CPT

## 2023-03-20 PROCEDURE — 700102 HCHG RX REV CODE 250 W/ 637 OVERRIDE(OP): Performed by: PEDIATRICS

## 2023-03-20 RX ORDER — HEPARIN SODIUM,PORCINE 10 UNIT/ML
30 VIAL (ML) INTRAVENOUS PRN
Status: CANCELLED | OUTPATIENT
Start: 2023-03-20

## 2023-03-20 RX ORDER — HEPARIN SODIUM (PORCINE) LOCK FLUSH IV SOLN 100 UNIT/ML 100 UNIT/ML
500 SOLUTION INTRAVENOUS PRN
Status: CANCELLED | OUTPATIENT
Start: 2023-03-20

## 2023-03-20 RX ORDER — ACETAMINOPHEN 325 MG/1
650 TABLET ORAL PRN
Status: DISCONTINUED | OUTPATIENT
Start: 2023-03-20 | End: 2023-03-21 | Stop reason: HOSPADM

## 2023-03-20 RX ORDER — LORAZEPAM 2 MG/ML
1 CONCENTRATE ORAL EVERY 6 HOURS PRN
Status: CANCELLED | OUTPATIENT
Start: 2023-04-11

## 2023-03-20 RX ORDER — ONDANSETRON 2 MG/ML
8 INJECTION INTRAMUSCULAR; INTRAVENOUS ONCE
Status: CANCELLED | OUTPATIENT
Start: 2023-03-27

## 2023-03-20 RX ORDER — 0.9 % SODIUM CHLORIDE 0.9 %
20 VIAL (ML) INJECTION PRN
Status: CANCELLED | OUTPATIENT
Start: 2023-03-20

## 2023-03-20 RX ORDER — DIPHENHYDRAMINE HYDROCHLORIDE 50 MG/ML
25 INJECTION INTRAMUSCULAR; INTRAVENOUS PRN
Status: DISCONTINUED | OUTPATIENT
Start: 2023-03-20 | End: 2023-03-21 | Stop reason: HOSPADM

## 2023-03-20 RX ORDER — ONDANSETRON 2 MG/ML
8 INJECTION INTRAMUSCULAR; INTRAVENOUS ONCE
Status: COMPLETED | OUTPATIENT
Start: 2023-03-20 | End: 2023-03-20

## 2023-03-20 RX ORDER — DIPHENHYDRAMINE HYDROCHLORIDE 50 MG/ML
25 INJECTION INTRAMUSCULAR; INTRAVENOUS PRN
Status: CANCELLED | OUTPATIENT
Start: 2023-03-20

## 2023-03-20 RX ORDER — HEPARIN SODIUM (PORCINE) LOCK FLUSH IV SOLN 100 UNIT/ML 100 UNIT/ML
500 SOLUTION INTRAVENOUS PRN
Status: DISCONTINUED | OUTPATIENT
Start: 2023-03-20 | End: 2023-03-21 | Stop reason: HOSPADM

## 2023-03-20 RX ORDER — ACETAMINOPHEN 325 MG/1
650 TABLET ORAL PRN
Status: CANCELLED | OUTPATIENT
Start: 2023-03-20

## 2023-03-20 RX ORDER — LORAZEPAM 2 MG/ML
1 CONCENTRATE ORAL EVERY 6 HOURS PRN
Status: DISCONTINUED | OUTPATIENT
Start: 2023-03-20 | End: 2023-03-20

## 2023-03-20 RX ADMIN — ONDANSETRON 8 MG: 2 INJECTION INTRAMUSCULAR; INTRAVENOUS at 10:28

## 2023-03-20 RX ADMIN — HEPARIN 500 UNITS: 100 SYRINGE at 13:53

## 2023-03-20 RX ADMIN — LORAZEPAM 1 MG: 2 SOLUTION, CONCENTRATE ORAL at 11:39

## 2023-03-20 ASSESSMENT — FIBROSIS 4 INDEX: FIB4 SCORE: 2.84

## 2023-03-20 NOTE — PROGRESS NOTES
PT to [facility] for chemotherapy. Afebrile.  VSS. Port accessed using a 22g 3/4 inch trevino needle with 1 attempt and labs drawn by Tammie Covington RN.   PT tolerated well.     Pt nauseas and vomiting, Zofran and Ativan administered, see MAR.    Office visit with Dr. Faye completed. Platelets 40- chemotherapy delayed. Hemoglobin 8.2- one unit of PRBCs to be administered.     Transfusion consent signed on 3/9/23.    PRBC: Donor #  [phone] started at 1130    Total volume infused: 321ml    Vital signs monitored per protocol.  Transfusion completed at 1350 and PT tolerated well.  Port flushed per orders (see MAR) and de-accessed.  Home with mother.  Next appointment scheduled on 3/27/23.

## 2023-03-20 NOTE — PROGRESS NOTES
Kami from Lab called with critical result of WBC 1.6 at 1030. Critical lab result read back to Kami.   Dr. Faye notified of critical lab result at 1030.  Critical lab result read back by Dr. Faye.

## 2023-03-23 NOTE — PROGRESS NOTES
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 eligibility checklist from 5/31/2022 and consent note from 6/1/2022)  Imatinib 400 mg PO QAM / 200 mg PO QPM started 6/3/2022 (allowed per [place] [address])  Patient completed the first 15 days of a Standard 4-drug Induction on 6/13/2022  Start of [place] [address](OS), Induction IA Part 2, Day 15 6/13/2022  End of Induction 1A Part 2 - MRD negative  Start of [place] [address](OS), Induction IA Part 2, Day 15 7/5/2022  Induction IB DELAYED 2 weeks 14 days from 7/26/2022-8/9/2022) for myelosuppression - Start of Day 22 cytarabine block 8/9/2022  Induction IB Day 42 delayed 9 days for myelosuppression - Day 42 evaluations 9/7/2022  End of Induction IB - Flow cytometric MRD negative, MRD by IgH-TCR PCR [id number]%  Randomization to [facility] of [place] [address]  Start of [facility]-Experimental [facility], Interim Maintenance 9/29/2022  Weight based increase in dose of imatinib to 400 mg PO AM and 250 mg PM 9/29/2022  Thrombocytopenia not permissive of proceeding with Day 15 of Interim  Maintenance  AR-Experimental Arm B, Interim Maintenance, Day 15 delayed 4 days, start 10/17/2022  AR-Experimental Arm B, Interim Maintenance, Day 29, start 11/1/2022  AR-Experimental Arm B, Interim Maintenance, Day 43, start 11/14/2022  Last does of 6-MP 11/27/2022  AR-Experimental Arm B, Delayed Intensification, Day 1, start 12/6/2022  Admission with Severe Septic Shock 12/27/2022  Imatinib HELD 12/27/2022-1/8/2023  AR-Experimental Arm B, Delayed Intensification, Day 29 DELAYED 14 days with start 1/17/2023  Hospitalization 2/7/2023 on Day 50 of Delayed Intensification with left shoulder pain ultimately diagnosed with Bacteroides fragilis infection  AR-Experimental Arm B, Interim Maintenance, Day 1 DELAYED 7 days with start 2/27/2023  AR-Experimental Arm B, Interim Maintenance, Day 11 DELAYED today secondary to thrombocytopenia with platelet count of 43    Past Medical History:    1) Previously Healthy  2) 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  3) Hyperleukocytosis  4) Hyperuricemia  5) Hyperphosphatemia  6) Right Lower Extremity Superficial Thrombus  7) Subsegmental Pulmonary Embolism  8) 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   Two dogs.  Everyone is well in the house. Father not involved.     Family History:     Family History             Family History   Problem Relation Age of Onset    No Known Problems Mother      Diabetes  "Paternal Grandfather      Hypertension Paternal Grandfather      Hyperlipidemia Paternal Grandfather      Cancer Neg Hx      Heart Disease Neg Hx      Stroke Neg Hx           No significant family history of cancer.  Both maternal and paternal family history of diabetes.     Immunizations:  UTD     Medications:   Current Outpatient Medications on File Prior to Encounter   Medication Sig Dispense Refill    famotidine (PEPCID) 20 MG Tab Take 1 Tablet by mouth 2 times a day. 60 Tablet 4    metroNIDAZOLE (FLAGYL) 500 MG Tab Take 1 Tablet by mouth every 8 hours. 100 Tablet 2    ondansetron (ZOFRAN ODT) 8 MG TABLET DISPERSIBLE Dissolve 1 Tablet by mouth every 6 hours as needed for Nausea/Vomiting. 10 Tablet 0    senna-docusate (PERICOLACE OR SENOKOT S) 8.6-50 MG Tab Take 1 Tablet by mouth every day. 30 Tablet 0    imatinib (GLEEVEC) 100 MG tablet Take 200 mg by mouth every day. Pt takes 200 mg + 400 mg for a total dose of 600 mg daily      imatinib (GLEEVEC) 400 MG tablet Take 400 mg by mouth every day. Pt takes 200 mg + 400 mg for a total dose of 600 mg daily      sulfamethoxazole-trimethoprim (BACTRIM DS) 800-160 MG tablet Take 2 Tablets by mouth in the morning every Sat and Sun.      vitamin D3 (CHOLECALCIFEROL) 1000 Unit (25 mcg) Tab Take 1 Tablet by mouth every day.      therapeutic multivitamin-minerals (THERAGRAN-M) Tab Take 1 Tablet by mouth every day.       No current facility-administered medications on file prior to encounter.       OBJECTIVE:     Vitals:   /76   Pulse 100   Temp 36.6 °C (97.8 °F) (Temporal)   Resp 18   Ht 1.668 m (5' 5.67\")   Wt 56.6 kg (124 lb 12.5 oz)   SpO2 100%     Labs:    Hospital Outpatient Visit on 03/09/2023   Component Date Value    WBC 03/09/2023 2.9 (L)     RBC 03/09/2023 3.02 (L)     Hemoglobin 03/09/2023 8.5 (L)     Hematocrit 03/09/2023 26.3 (L)     MCV 03/09/2023 87.1     MCH 03/09/2023 28.1     MCHC 03/09/2023 32.3 (L)     RDW 03/09/2023 51.6 (H)     Platelet Count " 03/09/2023 43 (L)     MPV 03/09/2023 12.4     Neutrophils-Polys 03/09/2023 78.40 (H)     Lymphocytes 03/09/2023 11.00 (L)     Monocytes 03/09/2023 9.60     Eosinophils 03/09/2023 0.00     Basophils 03/09/2023 0.30     Immature Granulocytes 03/09/2023 0.70     Nucleated RBC 03/09/2023 1.40     Neutrophils (Absolute) 03/09/2023 2.28     Lymphs (Absolute) 03/09/2023 0.32 (L)     Monos (Absolute) 03/09/2023 0.28     Eos (Absolute) 03/09/2023 0.00     Baso (Absolute) 03/09/2023 0.01     Immature Granulocytes (a* 03/09/2023 0.02     NRBC (Absolute) 03/09/2023 0.04     Sodium 03/09/2023 139     Potassium 03/09/2023 3.2 (L)     Chloride 03/09/2023 105     Co2 03/09/2023 23     Anion Gap 03/09/2023 11.0     Glucose 03/09/2023 121 (H)     Bun 03/09/2023 7 (L)     Creatinine 03/09/2023 0.40 (L)     Calcium 03/09/2023 8.7     AST(SGOT) 03/09/2023 24     ALT(SGPT) 03/09/2023 14     Alkaline Phosphatase 03/09/2023 119 (H)     Total Bilirubin 03/09/2023 0.5     Albumin 03/09/2023 3.4     Total Protein 03/09/2023 5.8 (L)     Globulin 03/09/2023 2.4     A-G Ratio 03/09/2023 1.4     Direct Bilirubin 03/09/2023 <0.2     Indirect Bilirubin 03/09/2023 see below     Miscellaneous Lab Result 03/09/2023 SEE NOTE     Component R 03/06/2023                      Value:RI                  RedBloodCellsIRR    [id number]   transfused   03/09/23   09:19      Product Type 03/06/2023 Red Blood Cells IRR LR     Dispense Status 03/06/2023 transfused     Unit Number (Barcoded) 03/06/2023 [id number]     Product Code (Barcoded) 03/06/2023 [id number]     Blood Type (Barcoded) 03/06/2023 5100     Correct Calcium 03/09/2023 9.2     GFR (CKD-EPI) 03/09/2023 159       Physical Exam:    Constitutional: Well-developed, well-nourished, and in no distress.  Well-appearing.  HENT: Normocephalic and atraumatic. No nasal congestion or rhinorrhea. Oropharynx is clear and moist. No oral ulcerations or sores.    Eyes: Conjunctivae are normal. Pupils are equal,  round.  EOMI.  Nonicteric.  Neck: Normal range of motion of neck, no adenopathy.    Cardiovascular: Mild tachycardia with rate at 110, regular rhythm and normal heart sounds.  No murmur heard. DP/radial pulses 2+, cap refill < 2 sec.  Pulmonary/Chest: Effort normal and breath sounds normal. No respiratory distress. Symmetric expansion.  No crackles or wheezes.  Abdomen: Soft. Bowel sounds are normal. No distension and no mass. There is no hepatosplenomegaly.    Genitourinary:  Deferred.  Musculoskeletal: Normal range of motion of lower and upper extremities bilaterally.  Improved range of motion of left shoulder, now approximately 60%.  Able to lift left arm at shoulder almost to right angle.  Neurological: Alert and oriented to person and place. Exhibits normal muscle tone bilaterally in upper and lower extremities. Gait normal. Coordination normal.    Skin: Skin is warm, dry and pink.  No rash or evidence of skin infection.  No pallor.   Psychiatric: Mood and affect normal for age.    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 and whose End of Induction IB MRD was both negative by flow cytometry and PCR who presents for Interim Maintenance, Day 11 with Capizzi MTX as well as scheduled transfusion      9) Ph+ Precursor B-Cell Acute Lymphoblastic Leukemia, in MRD Remission:  - 2-3 weeks of symptoms  - Presenting WBC > 440 k/uL, hyperleukocytosis  - Start of Hydroxyurea (1500 mg PO Q8) 2320 on 5/27/2022  - discontinued after only 55 hours  - No steroid pretreatment  - 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  - Testicular status NEGATIVE       - Flow cytometry of both 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, FISH positive for BCR-ABL1 translocation  - Enrolled on [facility] [id number]  - Initially enrolled on [facility] [id number] - but taken off study due to Ph+  ALL status                            - Enrolled on [facility] [id number] and began study 6/13/2022              - Started imatinib therapy 6/3/2022   - End of Induction IA and IB MRD negative  - Imatinib dose increased to 400 mg PO AM and 250 mg PM 9/29/2022  -* Note:  All imatinib doses given inpatient rounded down to 600 mg  - Imatinib held for Septic Shock 12/27/2022-1/8/2023  - Imatinib 600 mg PO daily restarted 1/8/2023 inpatient  - Imatinib 400 mg PO QAM and 250 mg PO QHS 1/14/2023-1/17/2023  - Imatinib 600 mg PO QAM 1/17/2023 - present                 - WBC 2.9, Hgb 8.5, platelets 43              - ANC 2280,               - AST 24, ALT 14, total bilirubin <0.2        - ANC recovered and permissible for proceeding with Day 11 of Interim Maintenance however platelets remain 43 and therefore PER PROTOCOL will delay 4 days and obtain repeat counts on 3/13/2023    BRYAN[place], AR Arm B, Interim Maintenance, Day 11:   ** Vincristine 2 mg IV x 1 dose (HOLD)  ** Capizzi Methotrexate (HOLD)    ** Continue imatinib 600 mg PO daily            2) Soft Tissue Infection / Joint Infection with Bacteroides fragilis:  - Edema and pain continue to improve  - Was initially on cefepime for F&N, added IV Vancomycin on 2/10/2023, discontinued both cefepime and vancomycin on 2/20/2023 after start of IV Flagyl (below)  - S/P open exploration/drainage 2/17/2023  - Culture: B.fragilis (both deep and superficial cultures)   - ID Consultation with Dr. Singh - recommendation for swithc to metronidazole for 4+ weeks  - Flagyl 500 mg IV Q8 hours (Day 19)     ** Will attempt total of 6 weeks of Flagyl - if neuropathies complicate care can give 4+ weeks of therapy      3) Left Shoulder Pain (GREATLY IMPROVED):  - Improved range of motion and function improved range of motion and function  - Not requiring pain medication    4) Chemotherapy Related Pancytopenia:   - WBC 2.9, Hgb 8.5, platelets 43           - ANC 2280,    - Transfuse for  hemoglobin less than 7.5 or symptomatic  - Transfuse for platelets less than 10,000 or symptomatic  - Transfuse irradiated PRBC today     5) At Risk for DVT Secondary to PEG Asparaginase:   - Received PEG asparaginase on Day 2 of Interim Maintenance 3/1/2023  - Currently on apixaban 2.5 mg PO BID                  6) Tachycardia:   - Previous cardiac work-up to include echocardiogram as well as telemetry  - Continues to have positional tachycardia and occasional palpitations likely due to combination of factors to include deconditioning and anemia  - Transfusion today with PRBC     7) History of Peptic Ulcer Disease/Bleeding:  - Continue famotidine 20 mg BID  - Continue to monitor     8) Poor Appetite / Nausea (IMPROVED):  - Reports occasional nausea, improved appetite           9) At Risk of Opportunistic Lung Infection:  - Bactrim DS PO BID Sat and Sun for PJP prophylaxis     10) Central Access:   - R Port-A-Cath in place      Research Participant:           [facility] - Source Data         Diagnosis: Ph+ Precursor B-Cell Acute Lymphoblastic Leukemia     Disease Status: EOI1A MRD NEGATIVE, EOI1B RD NEGATIVE, CNS3c, testicular negative, HSV1 IgG POSITIVE, CMV IgG NEGATIVE, VARICELLA IgG POSITIVE     Active Studies: [id number], [id number]                                                                                                      Inactive Studies: [id number]                                                                                                                                                Optional Studies: None             Protocol: International Phase 3 trial in [place] 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      Treatment Plan: [id number](OS), AR-Arm B, Interim Maintenance, Day 11     Height: 1.650 m      Weight: 64.9kg       BSA: 1.73 m²   (Interim Maintenance, Day 1 2/27/2023)                                                                                                                                            Performance Status: Karnofsky 70, ECOG  3 (3/9/2023)     Treatment Plan Medications:  (100% adherent with imatinib)     Imatinib dose adjusted for weight at DI, Day 29 to Imatinib 600 mg PO daily in AM 1/17/2023     Evaluations / Study Labs:  (3/9/2023)      WBC 2.9, Hgb 8.5, platelets 43  ANC 2280,   AST 24, ALT 14, total bilirubin <0.2     Therapy Given: (3/9/2023)     Imatinib 600 mg PO QAM     Capizzi methotrexate and vincristine HELD due to myelosuppression 3/9/2023         Disposition: Return to clinic in 4 days for reevaluation of blood counts and premise ability to proceed with Day 11 of Interim Maintenance       Pepe Faye MD  Pediatric Hematology / Oncology  [facility]  Cell.  [phone]  Office. [phone]

## 2023-03-27 ENCOUNTER — HOSPITAL ENCOUNTER (OUTPATIENT)
Dept: INFUSION CENTER | Facility: MEDICAL CENTER | Age: 23
End: 2023-03-27
Attending: PEDIATRICS
Payer: COMMERCIAL

## 2023-03-27 VITALS
HEART RATE: 88 BPM | WEIGHT: 122.36 LBS | RESPIRATION RATE: 20 BRPM | SYSTOLIC BLOOD PRESSURE: 131 MMHG | OXYGEN SATURATION: 97 % | DIASTOLIC BLOOD PRESSURE: 88 MMHG | TEMPERATURE: 98.4 F | BODY MASS INDEX: 20.39 KG/M2 | HEIGHT: 65 IN

## 2023-03-27 DIAGNOSIS — C91.Z0 B LYMPHOBLASTIC LEUKEMIA WITH T(9;22)(Q34;Q11.2);BCR-ABL1 (HCC): ICD-10-CM

## 2023-03-27 DIAGNOSIS — C91.01 ACUTE LYMPHOBLASTIC LEUKEMIA (ALL) IN REMISSION (HCC): ICD-10-CM

## 2023-03-27 DIAGNOSIS — Z51.11 ENCOUNTER FOR CHEMOTHERAPY MANAGEMENT: ICD-10-CM

## 2023-03-27 LAB
ALBUMIN SERPL BCP-MCNC: 3.5 G/DL (ref 3.2–4.9)
ALBUMIN/GLOB SERPL: 1.6 G/DL
ALP SERPL-CCNC: 100 U/L (ref 30–99)
ALT SERPL-CCNC: 12 U/L (ref 2–50)
ANION GAP SERPL CALC-SCNC: 12 MMOL/L (ref 7–16)
AST SERPL-CCNC: 23 U/L (ref 12–45)
BASOPHILS # BLD AUTO: 0 % (ref 0–1.8)
BASOPHILS # BLD: 0 K/UL (ref 0–0.12)
BILIRUB CONJ SERPL-MCNC: <0.2 MG/DL (ref 0.1–0.5)
BILIRUB INDIRECT SERPL-MCNC: NORMAL MG/DL (ref 0–1)
BILIRUB SERPL-MCNC: 0.5 MG/DL (ref 0.1–1.5)
BUN SERPL-MCNC: 7 MG/DL (ref 8–22)
CALCIUM ALBUM COR SERPL-MCNC: 8.6 MG/DL (ref 8.5–10.5)
CALCIUM SERPL-MCNC: 8.2 MG/DL (ref 8.5–10.5)
CHLORIDE SERPL-SCNC: 105 MMOL/L (ref 96–112)
CO2 SERPL-SCNC: 21 MMOL/L (ref 20–33)
CREAT SERPL-MCNC: 0.49 MG/DL (ref 0.5–1.4)
EOSINOPHIL # BLD AUTO: 0 K/UL (ref 0–0.51)
EOSINOPHIL NFR BLD: 0 % (ref 0–6.9)
ERYTHROCYTE [DISTWIDTH] IN BLOOD BY AUTOMATED COUNT: 58 FL (ref 35.9–50)
GFR SERPLBLD CREATININE-BSD FMLA CKD-EPI: 149 ML/MIN/1.73 M 2
GLOBULIN SER CALC-MCNC: 2.2 G/DL (ref 1.9–3.5)
GLUCOSE SERPL-MCNC: 104 MG/DL (ref 65–99)
HCT VFR BLD AUTO: 27.6 % (ref 42–52)
HGB BLD-MCNC: 9 G/DL (ref 14–18)
IMM GRANULOCYTES # BLD AUTO: 0.03 K/UL (ref 0–0.11)
IMM GRANULOCYTES NFR BLD AUTO: 2.2 % (ref 0–0.9)
LYMPHOCYTES # BLD AUTO: 0.53 K/UL (ref 1–4.8)
LYMPHOCYTES NFR BLD: 39.6 % (ref 22–41)
MCH RBC QN AUTO: 29.1 PG (ref 27–33)
MCHC RBC AUTO-ENTMCNC: 32.6 G/DL (ref 33.7–35.3)
MCV RBC AUTO: 89.3 FL (ref 81.4–97.8)
MONOCYTES # BLD AUTO: 0.18 K/UL (ref 0–0.85)
MONOCYTES NFR BLD AUTO: 13.4 % (ref 0–13.4)
NEUTROPHILS # BLD AUTO: 0.6 K/UL (ref 1.82–7.42)
NEUTROPHILS NFR BLD: 44.8 % (ref 44–72)
NRBC # BLD AUTO: 0 K/UL
NRBC BLD-RTO: 0 /100 WBC
PLATELET # BLD AUTO: 46 K/UL (ref 164–446)
PMV BLD AUTO: 11 FL (ref 9–12.9)
POTASSIUM SERPL-SCNC: 3.5 MMOL/L (ref 3.6–5.5)
PROT SERPL-MCNC: 5.7 G/DL (ref 6–8.2)
RBC # BLD AUTO: 3.09 M/UL (ref 4.7–6.1)
SODIUM SERPL-SCNC: 138 MMOL/L (ref 135–145)
WBC # BLD AUTO: 1.3 K/UL (ref 4.8–10.8)

## 2023-03-27 PROCEDURE — 85025 COMPLETE CBC W/AUTO DIFF WBC: CPT

## 2023-03-27 PROCEDURE — 99213 OFFICE O/P EST LOW 20 MIN: CPT | Performed by: PEDIATRICS

## 2023-03-27 PROCEDURE — 82248 BILIRUBIN DIRECT: CPT

## 2023-03-27 PROCEDURE — 700111 HCHG RX REV CODE 636 W/ 250 OVERRIDE (IP): Performed by: PEDIATRICS

## 2023-03-27 PROCEDURE — 80053 COMPREHEN METABOLIC PANEL: CPT

## 2023-03-27 PROCEDURE — A4212 NON CORING NEEDLE OR STYLET: HCPCS

## 2023-03-27 PROCEDURE — 36591 DRAW BLOOD OFF VENOUS DEVICE: CPT

## 2023-03-27 PROCEDURE — 96374 THER/PROPH/DIAG INJ IV PUSH: CPT

## 2023-03-27 RX ORDER — ONDANSETRON 2 MG/ML
8 INJECTION INTRAMUSCULAR; INTRAVENOUS ONCE
Status: COMPLETED | OUTPATIENT
Start: 2023-03-27 | End: 2023-03-27

## 2023-03-27 RX ORDER — 0.9 % SODIUM CHLORIDE 0.9 %
20 VIAL (ML) INJECTION PRN
Status: CANCELLED | OUTPATIENT
Start: 2023-03-27

## 2023-03-27 RX ORDER — HEPARIN SODIUM (PORCINE) LOCK FLUSH IV SOLN 100 UNIT/ML 100 UNIT/ML
500 SOLUTION INTRAVENOUS PRN
Status: DISCONTINUED | OUTPATIENT
Start: 2023-03-27 | End: 2023-03-28 | Stop reason: HOSPADM

## 2023-03-27 RX ORDER — HEPARIN SODIUM (PORCINE) LOCK FLUSH IV SOLN 100 UNIT/ML 100 UNIT/ML
500 SOLUTION INTRAVENOUS PRN
Status: CANCELLED | OUTPATIENT
Start: 2023-03-27

## 2023-03-27 RX ORDER — ONDANSETRON 2 MG/ML
8 INJECTION INTRAMUSCULAR; INTRAVENOUS ONCE
Status: CANCELLED | OUTPATIENT
Start: 2023-04-06

## 2023-03-27 RX ORDER — HEPARIN SODIUM,PORCINE 10 UNIT/ML
30 VIAL (ML) INTRAVENOUS PRN
Status: CANCELLED | OUTPATIENT
Start: 2023-03-27

## 2023-03-27 RX ADMIN — ONDANSETRON HYDROCHLORIDE 8 MG: 2 SOLUTION INTRAMUSCULAR; INTRAVENOUS at 10:21

## 2023-03-27 RX ADMIN — HEPARIN 500 UNITS: 100 SYRINGE at 11:05

## 2023-03-27 ASSESSMENT — FIBROSIS 4 INDEX: FIB4 SCORE: 3.39

## 2023-03-27 NOTE — PROGRESS NOTES
Alpesh from Lab called with critical result of WBC 1.3 at 1025. Critical lab result read back to [facility].   Dr. Faye notified of critical lab result at 1026.  Critical lab result read back by Dr. Faye.

## 2023-03-27 NOTE — PROGRESS NOTES
Pt to [facility] for lab draw, doctors office visit, and chemotherapy administration.      Afebrile.  VSS.  Awake and alert in no acute distress.      Port accessed using a 22 g 3/4 inch trevino needle with 1 attempt by Allison PICU RN.  Labs drawn from the port without difficulty.   Pt tolerated well.      Patient arrived nauseated with emesis x1 upon rooming, c/o tingling in fingers, hot flash, and feelings of increased HR. MD notified of complaints.     Pt given zofran x1. Emesis x1 prior to administration.     Dr. Faye to bedside. Reviewed symptoms w/ patient. After reviewing labs, per MD, patient to be delayed until later in week.       Port flushed per orders (see MAR) and de-accessed after completion. PT home with mother.      Will return for next visit on 3/30/2023.

## 2023-03-28 ENCOUNTER — DOCUMENTATION (OUTPATIENT)
Dept: PHARMACY | Facility: MEDICAL CENTER | Age: 23
End: 2023-03-28
Payer: MEDICAID

## 2023-03-28 DIAGNOSIS — C91.Z0 B LYMPHOBLASTIC LEUKEMIA WITH T(9;22)(Q34;Q11.2);BCR-ABL1 (HCC): ICD-10-CM

## 2023-03-28 RX ORDER — IMATINIB MESYLATE 100 MG/1
200 TABLET, FILM COATED ORAL DAILY
Qty: 60 TABLET | Refills: 5 | Status: SHIPPED | OUTPATIENT
Start: 2023-03-28 | End: 2023-07-05 | Stop reason: SDUPTHER

## 2023-03-28 RX ORDER — IMATINIB MESYLATE 400 MG/1
400 TABLET, FILM COATED ORAL DAILY
Qty: 30 TABLET | Refills: 5 | Status: SHIPPED | OUTPATIENT
Start: 2023-03-28 | End: 2023-07-05 | Stop reason: SDUPTHER

## 2023-03-28 NOTE — PROGRESS NOTES
Drug: Gleevac 400mg & Gleevac 100mg tablets   Status: NO PA Needed  Copay: pharmacy is not in-network; refill too soon til 04/20/23  Fill with [facility]: NO     Will release to preferred pharmacy

## 2023-03-30 ENCOUNTER — HOSPITAL ENCOUNTER (OUTPATIENT)
Dept: INFUSION CENTER | Facility: MEDICAL CENTER | Age: 23
End: 2023-03-30
Attending: PEDIATRICS
Payer: COMMERCIAL

## 2023-03-30 VITALS
DIASTOLIC BLOOD PRESSURE: 87 MMHG | OXYGEN SATURATION: 97 % | HEIGHT: 65 IN | BODY MASS INDEX: 20.5 KG/M2 | TEMPERATURE: 98.5 F | SYSTOLIC BLOOD PRESSURE: 128 MMHG | RESPIRATION RATE: 20 BRPM | HEART RATE: 129 BPM | WEIGHT: 123.02 LBS

## 2023-03-30 DIAGNOSIS — C91.01 ACUTE LYMPHOBLASTIC LEUKEMIA (ALL) IN REMISSION (HCC): ICD-10-CM

## 2023-03-30 DIAGNOSIS — Z51.11 ENCOUNTER FOR CHEMOTHERAPY MANAGEMENT: ICD-10-CM

## 2023-03-30 DIAGNOSIS — C91.Z0 B LYMPHOBLASTIC LEUKEMIA WITH T(9;22)(Q34;Q11.2);BCR-ABL1 (HCC): ICD-10-CM

## 2023-03-30 LAB
ALBUMIN SERPL BCP-MCNC: 3.6 G/DL (ref 3.2–4.9)
ALBUMIN/GLOB SERPL: 1.8 G/DL
ALP SERPL-CCNC: 97 U/L (ref 30–99)
ALT SERPL-CCNC: 13 U/L (ref 2–50)
ANION GAP SERPL CALC-SCNC: 9 MMOL/L (ref 7–16)
ANISOCYTOSIS BLD QL SMEAR: ABNORMAL
AST SERPL-CCNC: 21 U/L (ref 12–45)
BASOPHILS # BLD AUTO: 0 % (ref 0–1.8)
BASOPHILS # BLD: 0 K/UL (ref 0–0.12)
BILIRUB CONJ SERPL-MCNC: <0.2 MG/DL (ref 0.1–0.5)
BILIRUB INDIRECT SERPL-MCNC: NORMAL MG/DL (ref 0–1)
BILIRUB SERPL-MCNC: 0.4 MG/DL (ref 0.1–1.5)
BUN SERPL-MCNC: 7 MG/DL (ref 8–22)
CALCIUM ALBUM COR SERPL-MCNC: 8.5 MG/DL (ref 8.5–10.5)
CALCIUM SERPL-MCNC: 8.2 MG/DL (ref 8.5–10.5)
CHLORIDE SERPL-SCNC: 106 MMOL/L (ref 96–112)
CO2 SERPL-SCNC: 22 MMOL/L (ref 20–33)
CREAT SERPL-MCNC: 0.57 MG/DL (ref 0.5–1.4)
EOSINOPHIL # BLD AUTO: 0 K/UL (ref 0–0.51)
EOSINOPHIL NFR BLD: 0 % (ref 0–6.9)
ERYTHROCYTE [DISTWIDTH] IN BLOOD BY AUTOMATED COUNT: 62.9 FL (ref 35.9–50)
GFR SERPLBLD CREATININE-BSD FMLA CKD-EPI: 143 ML/MIN/1.73 M 2
GIANT PLATELETS BLD QL SMEAR: NORMAL
GLOBULIN SER CALC-MCNC: 2 G/DL (ref 1.9–3.5)
GLUCOSE SERPL-MCNC: 90 MG/DL (ref 65–99)
HCT VFR BLD AUTO: 27 % (ref 42–52)
HGB BLD-MCNC: 8.9 G/DL (ref 14–18)
LYMPHOCYTES # BLD AUTO: 0.49 K/UL (ref 1–4.8)
LYMPHOCYTES NFR BLD: 44.8 % (ref 22–41)
MACROCYTES BLD QL SMEAR: ABNORMAL
MANUAL DIFF BLD: NORMAL
MCH RBC QN AUTO: 29.8 PG (ref 27–33)
MCHC RBC AUTO-ENTMCNC: 33 G/DL (ref 33.7–35.3)
MCV RBC AUTO: 90.3 FL (ref 81.4–97.8)
MONOCYTES # BLD AUTO: 0.15 K/UL (ref 0–0.85)
MONOCYTES NFR BLD AUTO: 14 % (ref 0–13.4)
MORPHOLOGY BLD-IMP: NORMAL
NEUTROPHILS # BLD AUTO: 0.45 K/UL (ref 1.82–7.42)
NEUTROPHILS NFR BLD: 41.2 % (ref 44–72)
NRBC # BLD AUTO: 0.02 K/UL
NRBC BLD-RTO: 1.8 /100 WBC
PLATELET # BLD AUTO: 46 K/UL (ref 164–446)
PLATELET BLD QL SMEAR: NORMAL
PMV BLD AUTO: 11.2 FL (ref 9–12.9)
POLYCHROMASIA BLD QL SMEAR: NORMAL
POTASSIUM SERPL-SCNC: 4.2 MMOL/L (ref 3.6–5.5)
PROT SERPL-MCNC: 5.6 G/DL (ref 6–8.2)
RBC # BLD AUTO: 2.99 M/UL (ref 4.7–6.1)
RBC BLD AUTO: PRESENT
SODIUM SERPL-SCNC: 137 MMOL/L (ref 135–145)
WBC # BLD AUTO: 1.1 K/UL (ref 4.8–10.8)

## 2023-03-30 PROCEDURE — A4212 NON CORING NEEDLE OR STYLET: HCPCS

## 2023-03-30 PROCEDURE — 36591 DRAW BLOOD OFF VENOUS DEVICE: CPT

## 2023-03-30 PROCEDURE — 85025 COMPLETE CBC W/AUTO DIFF WBC: CPT

## 2023-03-30 PROCEDURE — 85007 BL SMEAR W/DIFF WBC COUNT: CPT

## 2023-03-30 PROCEDURE — 700111 HCHG RX REV CODE 636 W/ 250 OVERRIDE (IP): Performed by: PEDIATRICS

## 2023-03-30 PROCEDURE — 99214 OFFICE O/P EST MOD 30 MIN: CPT | Performed by: PEDIATRICS

## 2023-03-30 PROCEDURE — 80053 COMPREHEN METABOLIC PANEL: CPT

## 2023-03-30 PROCEDURE — 82248 BILIRUBIN DIRECT: CPT

## 2023-03-30 RX ORDER — HEPARIN SODIUM (PORCINE) LOCK FLUSH IV SOLN 100 UNIT/ML 100 UNIT/ML
500 SOLUTION INTRAVENOUS PRN
Status: DISCONTINUED | OUTPATIENT
Start: 2023-03-30 | End: 2023-03-31 | Stop reason: HOSPADM

## 2023-03-30 RX ORDER — 0.9 % SODIUM CHLORIDE 0.9 %
20 VIAL (ML) INJECTION PRN
Status: CANCELLED | OUTPATIENT
Start: 2023-03-30

## 2023-03-30 RX ORDER — HEPARIN SODIUM (PORCINE) LOCK FLUSH IV SOLN 100 UNIT/ML 100 UNIT/ML
500 SOLUTION INTRAVENOUS PRN
Status: CANCELLED | OUTPATIENT
Start: 2023-03-30

## 2023-03-30 RX ORDER — HEPARIN SODIUM,PORCINE 10 UNIT/ML
30 VIAL (ML) INTRAVENOUS PRN
Status: CANCELLED | OUTPATIENT
Start: 2023-03-30

## 2023-03-30 RX ADMIN — HEPARIN 500 UNITS: 100 SYRINGE at 11:10

## 2023-03-30 ASSESSMENT — FIBROSIS 4 INDEX: FIB4 SCORE: 3.031088913245535264

## 2023-03-30 NOTE — PROGRESS NOTES
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 5/31/2022 and consent note from 6/1/2022)  Imatinib 400 mg PO QAM / 200 mg PO QPM started 6/3/2022 (allowed per [place] [address])  Patient completed the first 15 days of a Standard 4-drug Induction on 6/13/2022  Start of [place] [address](OS), Induction IA Part 2, Day 15 6/13/2022  End of Induction 1A Part 2 - MRD negative  Start of [place] [address](OS), Induction IA Part 2, Day 15 7/5/2022  Induction IB DELAYED 2 weeks 14 days from 7/26/2022-8/9/2022) for myelosuppression - Start of Day 22 cytarabine block 8/9/2022  Induction IB Day 42 delayed 9 days for myelosuppression - Day 42 evaluations 9/7/2022  End of Induction IB - Flow cytometric MRD negative, MRD by IgH-TCR PCR [id number]%  Randomization to [facility] of [place] [address]  Start of [facility]-Experimental Arm B, Interim Maintenance 9/29/2022  Weight based increase in dose of imatinib to 400 mg PO AM and 250 mg PM 9/29/2022  Thrombocytopenia not permissive of proceeding with Day 15 of Interim Maintenance  AR-Experimental Arm B, Interim  Maintenance, Day 15 delayed 4 days, start 10/17/2022  AR-Experimental Arm B, Interim Maintenance, Day 29, start 11/1/2022  AR-Experimental Arm B, Interim Maintenance, Day 43, start 11/14/2022  Last does of 6-MP 11/27/2022  AR-Experimental Arm B, Delayed Intensification, Day 1, start 12/6/2022  Admission with Severe Septic Shock 12/27/2022  Imatinib HELD 12/27/2022-1/8/2023  AR-Experimental Arm B, Delayed Intensification, Day 29 DELAYED 14 days with start 1/17/2023  Hospitalization 2/7/2023 on Day 50 of Delayed Intensification with left shoulder pain ultimately diagnosed with Bacteroides fragilis infection  AR-Experimental Arm B, Interim Maintenance, Day 1 DELAYED 7 days with start 2/27/2023  AR-Experimental Arm B, Interim Maintenance, Day 11 DELAYED 4 days for platelets 43K on 3/9/2023  AR-Experimental Arm B, Interim Maintenance, Day 11 VCR given and MTX omitted for platelets 43K 3/13/2023  AR-Experimental Arm B, Interim Maintenance, Day 21 (DELAYED)     Past Medical History:    1) Previously Healthy  2) 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  3) Hyperleukocytosis  4) Hyperuricemia  5) Hyperphosphatemia  6) Right Lower Extremity Superficial Thrombus  7) Subsegmental Pulmonary Embolism  8) 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   Two dogs.  Everyone is well in the house. Father not involved.     Family History:     Family History       "       Family History   Problem Relation Age of Onset    No Known Problems Mother      Diabetes Paternal Grandfather      Hypertension Paternal Grandfather      Hyperlipidemia Paternal Grandfather      Cancer Neg Hx      Heart Disease Neg Hx      Stroke Neg Hx           No significant family history of cancer.  Both maternal and paternal family history of diabetes.     Immunizations:  UTD    Medications:   Current Outpatient Medications on File Prior to Encounter   Medication Sig Dispense Refill    Omega-3 Fatty Acids (FISH OIL PO) Take  by mouth.      famotidine (PEPCID) 20 MG Tab Take 1 Tablet by mouth 2 times a day. 60 Tablet 4    metroNIDAZOLE (FLAGYL) 500 MG Tab Take 1 Tablet by mouth every 8 hours. 100 Tablet 2    ondansetron (ZOFRAN ODT) 8 MG TABLET DISPERSIBLE Dissolve 1 Tablet by mouth every 6 hours as needed for Nausea/Vomiting. 10 Tablet 0    senna-docusate (PERICOLACE OR SENOKOT S) 8.6-50 MG Tab Take 1 Tablet by mouth every day. 30 Tablet 0    sulfamethoxazole-trimethoprim (BACTRIM DS) 800-160 MG tablet Take 2 Tablets by mouth in the morning every Sat and Sun.      vitamin D3 (CHOLECALCIFEROL) 1000 Unit (25 mcg) Tab Take 1 Tablet by mouth every day.      therapeutic multivitamin-minerals (THERAGRAN-M) Tab Take 1 Tablet by mouth every day.       No current facility-administered medications on file prior to encounter.     OBJECTIVE:     Vitals:   /69   Pulse (!) 107   Temp 37.3 °C (99.1 °F)   Resp 18   Ht 1.649 m (5' 4.92\")   Wt 55.4 kg (122 lb 2.2 oz)   SpO2 100%     Labs:    Hospital Outpatient Visit on 03/20/2023   Component Date Value    WBC 03/20/2023 1.6 (LL)     RBC 03/20/2023 2.88 (L)     Hemoglobin 03/20/2023 8.2 (L)     Hematocrit 03/20/2023 25.3 (L)     MCV 03/20/2023 87.8     MCH 03/20/2023 28.5     MCHC 03/20/2023 32.4 (L)     RDW 03/20/2023 55.5 (H)     Platelet Count 03/20/2023 40 (L)     MPV 03/20/2023 11.3     Neutrophils-Polys 03/20/2023 67.20     Lymphocytes 03/20/2023 29.30  "    Monocytes 03/20/2023 2.60     Eosinophils 03/20/2023 0.00     Basophils 03/20/2023 0.90     Nucleated RBC 03/20/2023 0.00     Neutrophils (Absolute) 03/20/2023 1.08 (L)     Lymphs (Absolute) 03/20/2023 0.47 (L)     Monos (Absolute) 03/20/2023 0.04     Eos (Absolute) 03/20/2023 0.00     Baso (Absolute) 03/20/2023 0.01     NRBC (Absolute) 03/20/2023 0.00     Hypochromia 03/20/2023 1+     Anisocytosis 03/20/2023 1+     Macrocytosis 03/20/2023 1+     Microcytosis 03/20/2023 1+     Sodium 03/20/2023 134 (L)     Potassium 03/20/2023 3.6     Chloride 03/20/2023 101     Co2 03/20/2023 21     Anion Gap 03/20/2023 12.0     Glucose 03/20/2023 134 (H)     Bun 03/20/2023 9     Creatinine 03/20/2023 0.58     Calcium 03/20/2023 8.3 (L)     AST(SGOT) 03/20/2023 25     ALT(SGPT) 03/20/2023 15     Alkaline Phosphatase 03/20/2023 101 (H)     Total Bilirubin 03/20/2023 0.5     Albumin 03/20/2023 3.6     Total Protein 03/20/2023 5.9 (L)     Globulin 03/20/2023 2.3     A-G Ratio 03/20/2023 1.6     Direct Bilirubin 03/20/2023 <0.2     Indirect Bilirubin 03/20/2023 see below     ABO Grouping Only 03/20/2023 O     Rh Grouping Only 03/20/2023 POS     Antibody Screen-Cod 03/20/2023 NEG     Component R 03/20/2023                      Value:RI6                 RedBloodCellsIRR    [id number]   transfused   03/20/23   11:12      Product Type 03/20/2023 Red Blood Cells IRR LR Pheresis     Dispense Status 03/20/2023 transfused     Unit Number (Barcoded) 03/20/2023 [id number]     Product Code (Barcoded) 03/20/2023 [id number]     Blood Type (Barcoded) 03/20/2023 9500     Correct Calcium 03/20/2023 8.6     GFR (CKD-EPI) 03/20/2023 142     Plt Estimation 03/20/2023 Marked Decrease     RBC Morphology 03/20/2023 Present     Peripheral Smear Review 03/20/2023 see below     Manual Diff Status 03/20/2023 PERFORMED       Physical Exam:    Constitutional: Well-developed, well-nourished, and in no distress.  Very well-appearing.  HENT: Normocephalic and  atraumatic.  Alopecia.  No nasal congestion or rhinorrhea. Oropharynx is clear and moist. No oral ulcerations or sores.    Eyes: Conjunctivae are normal. Pupils are equal, round.  EOMI.  Nonicteric.  Neck: Normal range of motion of neck, no adenopathy.    Cardiovascular: Normal rate, regular rhythm and normal heart sounds.  No murmur heard. DP/radial pulses 2+, cap refill < 2 sec.  Pulmonary/Chest: Effort normal and breath sounds normal. No respiratory distress. Symmetric expansion.  No crackles or wheezes.  Abdomen: Soft. Bowel sounds are normal. No distension and no mass. There is no hepatosplenomegaly.    Genitourinary:  Deferred.  Musculoskeletal: Normal range of motion of lower and upper extremities bilaterally.  Neurological: Alert and oriented to person and place. Exhibits normal muscle tone bilaterally in upper and lower extremities. Gait normal. Coordination normal.    Skin: Skin is warm, dry and pink.  No rash or evidence of skin infection.  No pallor.   Psychiatric: Mood and affect normal for age.    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 and whose End of Induction IB MRD was both negative by flow cytometry and PCR who presents for Interim Maintenance, Day 21 with Capizzi MTX      9) Ph+ Precursor B-Cell Acute Lymphoblastic Leukemia, in MRD Remission:  - 2-3 weeks of symptoms  - Presenting WBC > 440 k/uL, hyperleukocytosis  - Start of Hydroxyurea (1500 mg PO Q8) 2320 on 5/27/2022  - discontinued after only 55 hours  - No steroid pretreatment  - 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  - Testicular status NEGATIVE       - Flow cytometry of both 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, FISH positive for BCR-ABL1 translocation  - Enrolled on [facility] [id number]  - Initially enrolled on [facility] [id number] - but taken off study due to Ph+ ALL  status                            - Enrolled on [facility] [id number] and began study 6/13/2022              - Started imatinib therapy 6/3/2022   - End of Induction IA and IB MRD negative  - Imatinib dose increased to 400 mg PO AM and 250 mg PM 9/29/2022  -* Note:  All imatinib doses given inpatient rounded down to 600 mg  - Imatinib held for Septic Shock 12/27/2022-1/8/2023  - Imatinib 600 mg PO daily restarted 1/8/2023 inpatient  - Imatinib 400 mg PO QAM and 250 mg PO QHS 1/14/2023-1/17/2023  - Imatinib 600 mg PO QAM 1/17/2023 - present                 - WBC 1.6, Hgb 8.2, platelets 40              - ANC 1080,                            - ANC adequate for Day 21 however platelets remain suppressed at 40 therefore will not proceed with Day 21 therapy, will discharge to return in 7 days for repeat labs.      [id number], AR Arm B, Interim Maintenance, Day 21 (HOLD):     ** Continue imatinib 600 mg PO daily            - Return to clinic in 7 days for delayed Day 21     2) Soft Tissue Infection / Joint Infection with Bacteroides fragilis:  - Edema and pain continue to improve  - Was initially on cefepime for F&N, added IV Vancomycin on 2/10/2023, discontinued both cefepime and vancomycin on 2/20/2023 after start of IV Flagyl (below)  - S/P open exploration/drainage 2/17/2023  - Culture: B.fragilis (both deep and superficial cultures)   - ID Consultation with Dr. Singh - recommendation for switch to metronidazole for 4+ weeks  - Flagyl 500 mg PO Q8 hours (Day 30)  - Now beginning to have some neuropathies  ** Will attempt total of 6 weeks of Flagyl however, low threshold for discontinuation now that 4 weeks have been completed     3) Left Shoulder Pain (GREATLY IMPROVED):  - Improved range of motion and function improved range of motion and function  - Not requiring pain medication     4) Chemotherapy Related Pancytopenia:  - WBC 1.6, Hgb 8.2, platelets 40  - ANC 1080,   - Transfuse for hemoglobin less than 7.5 or  symptomatic  - Transfuse for platelets less than 10,000 or symptomatic  - Given persistent fatigue and positional tachycardia - transfuse PRBCs x 1 unit     5) At Risk for DVT Secondary to PEG Asparaginase:   - Received PEG asparaginase on Day 2 of Interim Maintenance 3/1/2023  - OK to D/C apixiban until 7 days after Day 22 therapy              6) Tachycardia (STABLE):   - Previous cardiac work-up to include echocardiogram as well as telemetry  - Continues to have positional tachycardia and occasional palpitations likely due to combination of factors to include deconditioning and anemia     7) History of Peptic Ulcer Disease/Bleeding:  - OK to discontinue Pepcid at request of patient     8) Poor Appetite / Nausea (IMPROVED):  - Reports occasional nausea, improved appetite            9) At Risk of Opportunistic Lung Infection:  - Bactrim DS PO BID Sat and Sun for PJP prophylaxis     10) Central Access:   - R Port-A-Cath in place      Research Participant:           [facility] - Source Data         Diagnosis: Ph+ Precursor B-Cell Acute Lymphoblastic Leukemia     Disease Status: EOI1A MRD NEGATIVE, EOI1B RD NEGATIVE, CNS3c, testicular negative, HSV1 IgG POSITIVE, CMV IgG NEGATIVE, VARICELLA IgG POSITIVE     Active Studies: [id number], [id number]                                                                                                      Inactive Studies: [id number]                                                                                                                                                Optional Studies: None             Protocol: International Phase 3 trial in [place] 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      Treatment Plan: [id number](OS), AR-Arm B, Interim Maintenance, Day 21 (Delayed 7 days)     Height: 1.650 m      Weight: 64.9kg       BSA: 1.73 m²   (Interim  Maintenance, Day 1 2/27/2023)                                                                                                                                           Performance Status: Karnofsky 70, ECOG  3 (3/9/2023)     Treatment Plan Medications:  (100% adherent with imatinib)     Imatinib dose adjusted for weight at DI, Day 29 to Imatinib 600 mg PO daily in AM 1/17/2023     Evaluations / Study Labs:  (3/20/2023)      WBC 1.6, Hgb 8.2, platelets 40  ANC 1080,      Therapy Given: (3/20/2023)     Day 21 therapy DELAYED         Disposition: Return to clinic in 7 days for delayed Day 21 of Interim Maintenance      Pepe Faye MD  Pediatric Hematology / Oncology  [facility]  Cell.  [phone]  [facility]. [phone]

## 2023-03-30 NOTE — PROGRESS NOTES
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 enrolled 6/1/2022 (see eligibility checklist from 5/31/2022 and consent note from 6/1/2022)  Imatinib 400 mg PO QAM / 200 mg PO QPM started 6/3/2022 (allowed per [place] [address])  Patient completed the first 15 days of a Standard 4-drug Induction on 6/13/2022  Start of [facility](OS), Induction IA Part 2, Day 15 6/13/2022  End of Induction 1A Part 2 - MRD negative  Start of [place] [address](OS), Induction IA Part 2, Day 15 7/5/2022  Induction IB DELAYED 2 weeks 14 days from 7/26/2022-8/9/2022) for myelosuppression - Start of Day 22 cytarabine block 8/9/2022  Induction IB Day 42 delayed 9 days for myelosuppression - Day 42 evaluations 9/7/2022  End of Induction IB - Flow cytometric MRD negative, MRD by IgH-TCR PCR [id number]%  Randomization to AR-Experimental Arm B of [place] [address]  Start of AR-Experimental Arm B, Interim Maintenance 9/29/2022  Weight based increase in dose of imatinib to 400 mg PO AM and 250 mg PM 9/29/2022  Thrombocytopenia not permissive of proceeding with Day 15 of Interim  Maintenance  AR-Experimental Arm B, Interim Maintenance, Day 15 delayed 4 days, start 10/17/2022  AR-Experimental Arm B, Interim Maintenance, Day 29, start 11/1/2022  AR-Experimental Arm B, Interim Maintenance, Day 43, start 11/14/2022  Last does of 6-MP 11/27/2022  AR-Experimental Arm B, Delayed Intensification, Day 1, start 12/6/2022  Admission with Severe Septic Shock 12/27/2022  Imatinib HELD 12/27/2022-1/8/2023  AR-Experimental Arm B, Delayed Intensification, Day 29 DELAYED 14 days with start 1/17/2023  Hospitalization 2/7/2023 on Day 50 of Delayed Intensification with left shoulder pain ultimately diagnosed with Bacteroides fragilis infection  AR-Experimental Arm B, Interim Maintenance, Day 1 DELAYED 7 days with start 2/27/2023  AR-Experimental Arm B, Interim Maintenance, Day 11 DELAYED 4 days for platelets 43K on 3/9/2023  AR-Experimental Arm B, Interim Maintenance, Day 11 VCR given and MTX omitted for platelets 43K 3/13/2023  AR-Experimental Arm B, Interim Maintenance, Day 21 (DELAYED)     Past Medical History:    1) Previously Healthy  2) 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  3) Hyperleukocytosis  4) Hyperuricemia  5) Hyperphosphatemia  6) Right Lower Extremity Superficial Thrombus  7) Subsegmental Pulmonary Embolism  8) 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  Engineering major at [employer]   Two dogs.  Everyone is well in the house. Father not  "involved.     Family History:     Family History             Family History   Problem Relation Age of Onset    No Known Problems Mother      Diabetes Paternal Grandfather      Hypertension Paternal Grandfather      Hyperlipidemia Paternal Grandfather      Cancer Neg Hx      Heart Disease Neg Hx      Stroke Neg Hx           No significant family history of cancer.  Both maternal and paternal family history of diabetes.     Immunizations:  UTD     Medications:   Current Outpatient Medications on File Prior to Encounter   Medication Sig Dispense Refill    Omega-3 Fatty Acids (FISH OIL PO) Take  by mouth.      famotidine (PEPCID) 20 MG Tab Take 1 Tablet by mouth 2 times a day. 60 Tablet 4    metroNIDAZOLE (FLAGYL) 500 MG Tab Take 1 Tablet by mouth every 8 hours. 100 Tablet 2    ondansetron (ZOFRAN ODT) 8 MG TABLET DISPERSIBLE Dissolve 1 Tablet by mouth every 6 hours as needed for Nausea/Vomiting. 10 Tablet 0    senna-docusate (PERICOLACE OR SENOKOT S) 8.6-50 MG Tab Take 1 Tablet by mouth every day. 30 Tablet 0    sulfamethoxazole-trimethoprim (BACTRIM DS) 800-160 MG tablet Take 2 Tablets by mouth in the morning every Sat and Sun.      vitamin D3 (CHOLECALCIFEROL) 1000 Unit (25 mcg) Tab Take 1 Tablet by mouth every day.      therapeutic multivitamin-minerals (THERAGRAN-M) Tab Take 1 Tablet by mouth every day.       No current facility-administered medications on file prior to encounter.       OBJECTIVE:     Vitals:   /88   Pulse 88   Temp 36.9 °C (98.4 °F) (Temporal)   Resp 20   Ht 1.646 m (5' 4.8\")   Wt 55.5 kg (122 lb 5.7 oz)   SpO2 97%     Labs:    Hospital Outpatient Visit on 03/27/2023   Component Date Value    WBC 03/27/2023 1.3 (LL)     RBC 03/27/2023 3.09 (L)     Hemoglobin 03/27/2023 9.0 (L)     Hematocrit 03/27/2023 27.6 (L)     MCV 03/27/2023 89.3     MCH 03/27/2023 29.1     MCHC 03/27/2023 32.6 (L)     RDW 03/27/2023 58.0 (H)     Platelet Count 03/27/2023 46 (L)     MPV 03/27/2023 11.0     " Neutrophils-Polys 03/27/2023 44.80     Lymphocytes 03/27/2023 39.60     Monocytes 03/27/2023 13.40     Eosinophils 03/27/2023 0.00     Basophils 03/27/2023 0.00     Immature Granulocytes 03/27/2023 2.20 (H)     Nucleated RBC 03/27/2023 0.00     Neutrophils (Absolute) 03/27/2023 0.60 (L)     Lymphs (Absolute) 03/27/2023 0.53 (L)     Monos (Absolute) 03/27/2023 0.18     Eos (Absolute) 03/27/2023 0.00     Baso (Absolute) 03/27/2023 0.00     Immature Granulocytes (a* 03/27/2023 0.03     NRBC (Absolute) 03/27/2023 0.00     Sodium 03/27/2023 138     Potassium 03/27/2023 3.5 (L)     Chloride 03/27/2023 105     Co2 03/27/2023 21     Anion Gap 03/27/2023 12.0     Glucose 03/27/2023 104 (H)     Bun 03/27/2023 7 (L)     Creatinine 03/27/2023 0.49 (L)     Calcium 03/27/2023 8.2 (L)     AST(SGOT) 03/27/2023 23     ALT(SGPT) 03/27/2023 12     Alkaline Phosphatase 03/27/2023 100 (H)     Total Bilirubin 03/27/2023 0.5     Albumin 03/27/2023 3.5     Total Protein 03/27/2023 5.7 (L)     Globulin 03/27/2023 2.2     A-G Ratio 03/27/2023 1.6     Direct Bilirubin 03/27/2023 <0.2     Indirect Bilirubin 03/27/2023 see below     Correct Calcium 03/27/2023 8.6     GFR (CKD-EPI) 03/27/2023 149      Physical Exam:    Constitutional: Well-developed, well-nourished, and in no distress.  Well appearing.  HENT: Normocephalic and atraumatic. Alopecia. No nasal congestion or rhinorrhea. Oropharynx is clear and moist. No oral ulcerations or sores.    Eyes: Conjunctivae are normal. Pupils are equal, round.  EOMI.  Nonicteric.    Neck: Normal range of motion of neck, no adenopathy.    Cardiovascular: Normal rate, regular rhythm and normal heart sounds.  No murmur heard. DP/radial pulses 2+, cap refill < 2 sec.  Pulmonary/Chest: Effort normal and breath sounds normal. No respiratory distress. Symmetric expansion.  No crackles or wheezes.  Abdomen: Soft. Bowel sounds are normal.    Genitourinary:  Deferred.  Musculoskeletal: Normal range of motion of  lower and upper extremities bilaterally.   Neurological: Alert and oriented to person and place. Exhibits normal muscle tone bilaterally in upper and lower extremities. Gait normal. Coordination normal.    Skin: Skin is warm, dry and pink.  No rash or evidence of skin infection.  No pallor.   Psychiatric: Mood and affect normal for age.    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 and whose End of Induction IB MRD was both negative by flow cytometry and PCR who presents for Interim Maintenance, Day 21 with Capizzi MTX  (DELAYED 7 DAYS)     1) Ph+ Precursor B-Cell Acute Lymphoblastic Leukemia, in MRD Remission:  - 2-3 weeks of symptoms  - Presenting WBC > 440 k/uL, hyperleukocytosis  - Start of Hydroxyurea (1500 mg PO Q8) 2320 on 5/27/2022  - discontinued after only 55 hours  - No steroid pretreatment  - 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  - Testicular status NEGATIVE       - Flow cytometry of both 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, FISH positive for BCR-ABL1 translocation  - [place] [facility] [id number]  - [place] [facility] [id number] but 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                            - [place] [facility] [id number] and began study 6/13/2022              - Started imatinib therapy 6/3/2022   - End of Induction IA and IB MRD negative  - Imatinib dose increased to 400 mg PO AM and 250 mg PM 9/29/2022  -* Note:  All imatinib doses given inpatient rounded down to 600 mg  - Imatinib held for Septic Shock 12/27/2022-1/8/2023  - Imatinib 600 mg PO daily restarted 1/8/2023 inpatient  - Imatinib 400 mg PO QAM and 250 mg PO QHS 1/14/2023-1/17/2023  - Imatinib 600 mg PO QAM 1/17/2023 - present                 - WBC 1.3, Hgb 9.0, platelets 46              - , , absolute monocyte count 180                            - ANC remains adequate although dropping, platelets remain suppressed at 46 therefore will not proceed with Day 21 therapy as instructed by protocol, will discharge to return in 3 days for repeat labs.      [id number], AR Arm B, Interim Maintenance, Day 21 (HOLD):      ** Continue imatinib 600 mg PO daily            - Return to clinic in 3 days for delayed Day 21     2) Soft Tissue Infection / Joint Infection with Bacteroides fragilis:  - Edema and pain continue to improve  - Was initially on cefepime for F&N, added IV Vancomycin on 2/10/2023, discontinued both cefepime and vancomycin on 2/20/2023 after start of IV Flagyl (below)  - S/P open exploration/drainage 2/17/2023  - Culture: B.fragilis (both deep and superficial cultures)   - ID Consultation with Dr. Singh - recommendation for switch to metronidazole for 4+ weeks  - Flagyl 500 mg PO Q8 hours (Day 37)  - Neuropathies worsening.- will D/C metronidazole after 5 weeks of therapy    3) Left Shoulder Pain (GREATLY IMPROVED):  - Improved range of motion and function improved range of motion and function  - Not requiring pain medication     4) Chemotherapy Related Pancytopenia:  - WBC 1.3, Hgb 9.0, platelets 46            - , , absolute monocyte count 180  - Transfuse for hemoglobin less than 7.5 or symptomatic  - Transfuse for platelets less than 10,000 or symptomatic    5) At Risk for DVT Secondary to PEG Asparaginase:   - Received PEG asparaginase on Day 2 of Interim Maintenance 3/1/2023  - OK to D/C apixiban until 7 days after Day 22 therapy              6) Tachycardia (STABLE):   - Previous cardiac work-up to include echocardiogram as well as telemetry  - Continues to have positional tachycardia and occasional palpitations likely due to combination of factors to include deconditioning and anemia     7) History of Peptic Ulcer Disease/Bleeding:  - Zoë longer taking Pepcid     8) Poor Appetite / Nausea (IMPROVED):  - Reports occasional nausea,  improved appetite            9) At Risk of Opportunistic Lung Infection:  - Bactrim DS PO BID Sat and Sun for PJP prophylaxis     10) Central Access:   - R Port-A-Cath in place      Research Participant:           [facility] - Source Data         Diagnosis: Ph+ Precursor B-Cell Acute Lymphoblastic Leukemia     Disease Status: EOI1A MRD NEGATIVE, EOI1B RD NEGATIVE, CNS3c, testicular negative, HSV1 IgG POSITIVE, CMV IgG NEGATIVE, VARICELLA IgG POSITIVE     Active Studies: [id number], [id number]                                                                                                      Inactive Studies: [id number]                                                                                                                                                Optional Studies: None             Protocol: International Phase 3 trial in [place] 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      Treatment Plan: [id number](OS), AR-Arm B, Interim Maintenance, Day 21 (Delayed 14 days)     Height: 1.650 m      Weight: 64.9kg       BSA: 1.73 m²   (Interim Maintenance, Day 1 2/27/2023)                                                                                                                                           Performance Status: Karnofsky 70, ECOG  3 (3/9/2023)     Treatment Plan Medications:  (100% adherent with imatinib)     Imatinib dose adjusted for weight at DI, Day 29 to Imatinib 600 mg PO daily in AM 1/17/2023     Evaluations / Study Labs:  (3/27/2023)      WBC 1.3, Hgb 9.0, platelets 46  , , absolute monocyte count 180     Therapy Given: (3/27/2023)     Day 21 therapy DELAYED          Disposition: Return to clinic in 3 days for delayed Day 21 of Interim Maintenance        Pepe Faye MD  Pediatric Hematology / Oncology  [facility]  Cell.  [phone]  Office.  [phone]

## 2023-03-30 NOTE — PROGRESS NOTES
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 (see eligibility checklist from 5/31/2022 and consent note from 6/1/2022)  Imatinib 400 mg PO QAM / 200 mg PO QPM started 6/3/2022 (allowed per [place] [address])  Patient completed the first 15 days of a Standard 4-drug Induction on 6/13/2022  Start of [place] [address](OS), Induction IA Part 2, Day 15 6/13/2022  End of Induction 1A Part 2 - MRD negative  Start of [place] [address](OS), Induction IA Part 2, Day 15 7/5/2022  Induction IB DELAYED 2 weeks 14 days from 7/26/2022-8/9/2022) for myelosuppression - Start of Day 22 cytarabine block 8/9/2022  Induction IB Day 42 delayed 9 days for myelosuppression - Day 42 evaluations 9/7/2022  End of Induction IB - Flow cytometric MRD negative, MRD by IgH-TCR PCR [id number]%  Randomization to AR-Experimental Arm B of [place] [address]  Start of AR-Experimental Arm B, Interim Maintenance 9/29/2022  Weight based increase in dose of imatinib to 400 mg PO AM and 250 mg PM 9/29/2022  Thrombocytopenia not permissive of  proceeding with Day 15 of Interim Maintenance  AR-Experimental Arm B, Interim Maintenance, Day 15 delayed 4 days, start 10/17/2022  AR-Experimental Arm B, Interim Maintenance, Day 29, start 11/1/2022  AR-Experimental Arm B, Interim Maintenance, Day 43, start 11/14/2022  Last does of 6-MP 11/27/2022  AR-Experimental Arm B, Delayed Intensification, Day 1, start 12/6/2022  Admission with Severe Septic Shock 12/27/2022  Imatinib HELD 12/27/2022-1/8/2023  AR-Experimental Arm B, Delayed Intensification, Day 29 DELAYED 14 days with start 1/17/2023  Hospitalization 2/7/2023 on Day 50 of Delayed Intensification with left shoulder pain ultimately diagnosed with Bacteroides fragilis infection  AR-Experimental Arm B, Interim Maintenance, Day 1 DELAYED 7 days with start 2/27/2023  AR-Experimental Arm B, Interim Maintenance, Day 11 DELAYED 4 days for platelets 43K on 3/9/2023  AR-Experimental Arm B, Interim Maintenance, Day 11 VCR given and MTX omitted for platelets 43K 3/13/2023    Past Medical History:    1) Previously Healthy  2) 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  3) Hyperleukocytosis  4) Hyperuricemia  5) Hyperphosphatemia  6) Right Lower Extremity Superficial Thrombus  7) Subsegmental Pulmonary Embolism  8) 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   Two dogs.  Everyone is well in the house. Father not involved.     Family History:    "  Family History             Family History   Problem Relation Age of Onset    No Known Problems Mother      Diabetes Paternal Grandfather      Hypertension Paternal Grandfather      Hyperlipidemia Paternal Grandfather      Cancer Neg Hx      Heart Disease Neg Hx      Stroke Neg Hx           No significant family history of cancer.  Both maternal and paternal family history of diabetes.     Immunizations:  UTD    Current Outpatient Medications on File Prior to Encounter   Medication Sig Dispense Refill    famotidine (PEPCID) 20 MG Tab Take 1 Tablet by mouth 2 times a day. 60 Tablet 4    metroNIDAZOLE (FLAGYL) 500 MG Tab Take 1 Tablet by mouth every 8 hours. 100 Tablet 2    ondansetron (ZOFRAN ODT) 8 MG TABLET DISPERSIBLE Dissolve 1 Tablet by mouth every 6 hours as needed for Nausea/Vomiting. 10 Tablet 0    senna-docusate (PERICOLACE OR SENOKOT S) 8.6-50 MG Tab Take 1 Tablet by mouth every day. 30 Tablet 0    sulfamethoxazole-trimethoprim (BACTRIM DS) 800-160 MG tablet Take 2 Tablets by mouth in the morning every Sat and Sun.      vitamin D3 (CHOLECALCIFEROL) 1000 Unit (25 mcg) Tab Take 1 Tablet by mouth every day.      therapeutic multivitamin-minerals (THERAGRAN-M) Tab Take 1 Tablet by mouth every day.       No current facility-administered medications on file prior to encounter.     OBJECTIVE:     Vitals:   /79   Pulse (!) 111   Temp 36.8 °C (98.2 °F) (Temporal)   Resp 18   Ht 1.64 m (5' 4.57\")   Wt 57.2 kg (126 lb 1.7 oz)   SpO2 99%     Labs:    Hospital Outpatient Visit on 03/13/2023   Component Date Value    WBC 03/13/2023 2.0 (L)     RBC 03/13/2023 3.10 (L)     Hemoglobin 03/13/2023 8.8 (L)     Hematocrit 03/13/2023 26.8 (L)     MCV 03/13/2023 86.5     MCH 03/13/2023 28.4     MCHC 03/13/2023 32.8 (L)     RDW 03/13/2023 51.7 (H)     Platelet Count 03/13/2023 43 (L)     MPV 03/13/2023 10.9     Neutrophils-Polys 03/13/2023 57.40     Lymphocytes 03/13/2023 26.70     Monocytes 03/13/2023 14.40 (H)     " Eosinophils 03/13/2023 0.00     Basophils 03/13/2023 0.00     Immature Granulocytes 03/13/2023 1.50 (H)     Nucleated RBC 03/13/2023 1.50     Neutrophils (Absolute) 03/13/2023 1.16 (L)     Lymphs (Absolute) 03/13/2023 0.54 (L)     Monos (Absolute) 03/13/2023 0.29     Eos (Absolute) 03/13/2023 0.00     Baso (Absolute) 03/13/2023 0.00     Immature Granulocytes (a* 03/13/2023 0.03     NRBC (Absolute) 03/13/2023 0.03     Sodium 03/13/2023 138     Potassium 03/13/2023 3.6     Chloride 03/13/2023 105     Co2 03/13/2023 22     Anion Gap 03/13/2023 11.0     Glucose 03/13/2023 91     Bun 03/13/2023 8     Creatinine 03/13/2023 0.57     Calcium 03/13/2023 8.3 (L)     AST(SGOT) 03/13/2023 21     ALT(SGPT) 03/13/2023 13     Alkaline Phosphatase 03/13/2023 117 (H)     Total Bilirubin 03/13/2023 0.5     Albumin 03/13/2023 3.1 (L)     Total Protein 03/13/2023 5.4 (L)     Globulin 03/13/2023 2.3     A-G Ratio 03/13/2023 1.3     Direct Bilirubin 03/13/2023 <0.2     Indirect Bilirubin 03/13/2023 see below     Miscellaneous Lab Result 03/13/2023 SEE NOTE     Correct Calcium 03/13/2023 9.0     GFR (CKD-EPI) 03/13/2023 143       Physical Exam:    Constitutional: Well-developed, well-nourished, and in no distress.  Very well-appearing.  HENT: Normocephalic and atraumatic.  Alopecia.  No nasal congestion or rhinorrhea. Oropharynx is clear and moist. No oral ulcerations or sores.    Eyes: Conjunctivae are normal. Pupils are equal, round.  Nonicteric.  EOMI.  Neck: Normal range of motion of neck, no adenopathy.    Cardiovascular: Normal rate, regular rhythm and normal heart sounds.  No murmur heard. DP/radial pulses 2+, cap refill < 2 sec.  Pulmonary/Chest: Effort normal and breath sounds normal. No respiratory distress. Symmetric expansion.  No crackles or wheezes.  Abdomen: Soft. Bowel sounds are normal. No distension and no mass. There is no hepatosplenomegaly.    Genitourinary:  Deferred.  Musculoskeletal: Normal range of motion of lower  and upper extremities bilaterally.    Neurological: Alert and oriented to person and place. Gait normal. Coordination normal.    Skin: Skin is warm, dry and pink.  No rash or evidence of skin infection.  No pallor.   Psychiatric: Mood and affect normal for age.    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 and whose End of Induction IB MRD was both negative by flow cytometry and PCR who presents for Interim Maintenance, Day 11 (delayed 4 days)  with Capizzi MTX as well as scheduled transfusion      9) Ph+ Precursor B-Cell Acute Lymphoblastic Leukemia, in MRD Remission:  - 2-3 weeks of symptoms  - Presenting WBC > 440 k/uL, hyperleukocytosis  - Start of Hydroxyurea (1500 mg PO Q8) 2320 on 5/27/2022  - discontinued after only 55 hours  - No steroid pretreatment  - 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  - Testicular status NEGATIVE       - Flow cytometry of both 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, FISH positive for BCR-ABL1 translocation  - Enrolled on [facility] [id number]  - Initially enrolled on [facility] [id number] - but 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                            - Enrolled on [facility] [id number] and began study 6/13/2022              - Started imatinib therapy 6/3/2022   - End of Induction IA and IB MRD negative  - Imatinib dose increased to 400 mg PO AM and 250 mg PM 9/29/2022  -* Note:  All imatinib doses given inpatient rounded down to 600 mg  - Imatinib held for Septic Shock 12/27/2022-1/8/2023  - Imatinib 600 mg PO daily restarted 1/8/2023 inpatient  - Imatinib 400 mg PO QAM and 250 mg PO QHS 1/14/2023-1/17/2023  - Imatinib 600 mg PO QAM 1/17/2023 - present                 - WBC 2.0, Hgb 8.8, platelets 43              - ANC 1160,               - AST 21, ALT 13, total bilirubin <0.2                           - ANC still in  permissible for proceeding with Day 11 chemotherapy however platelet count remains 43 despite delay of 4 days.  As per protocol instructions, Day 11 methotrexate will be omitted.  Vincristine will be given and the patient will return in 7 days for his Day 21 chemotherapy.      [id number], AR Arm B, Interim Maintenance, Day 11 (4 days delayed, MTX omitted due to persistent myelosuppression):   ** Vincristine 2 mg IV x 1 dose today  ** Capizzi Methotrexate (OMIT) - previous dose on Day 1 = 100 mg/m2     ** Continue imatinib 600 mg PO daily            - Return to clinic in 7 days for Day 21     2) Soft Tissue Infection / Joint Infection with Bacteroides fragilis:  - Edema and pain continue to improve  - Was initially on cefepime for F&N, added IV Vancomycin on 2/10/2023, discontinued both cefepime and vancomycin on 2/20/2023 after start of IV Flagyl (below)  - S/P open exploration/drainage 2/17/2023  - Culture: B.fragilis (both deep and superficial cultures)   - ID Consultation with Dr. Singh - recommendation for swithc to metronidazole for 4+ weeks  - Flagyl 500 mg PO Q8 hours (Day 23)     ** Will attempt total of 6 weeks of Flagyl - if neuropathies complicate care can give 4+ weeks of therapy      3) Left Shoulder Pain (GREATLY IMPROVED):  - Improved range of motion and function improved range of motion and function  - Not requiring pain medication     4) Chemotherapy Related Pancytopenia:             - WBC 2.0, Hgb 8.8, platelets 43              - ANC 1160,      - Transfuse for hemoglobin less than 7.5 or symptomatic  - Transfuse for platelets less than 10,000 or symptomatic     5) At Risk for DVT Secondary to PEG Asparaginase:   - Received PEG asparaginase on Day 2 of Interim Maintenance 3/1/2023  - Currently on apixaban 2.5 mg PO BID                  6) Tachycardia (STABLE):   - Previous cardiac work-up to include echocardiogram as well as telemetry  - Continues to have positional tachycardia and occasional  palpitations likely due to combination of factors to include deconditioning and anemia     7) History of Peptic Ulcer Disease/Bleeding:  - Continue famotidine 20 mg BID  - Continue to monitor     8) Poor Appetite / Nausea (IMPROVED):  - Reports occasional nausea, improved appetite            9) At Risk of Opportunistic Lung Infection:  - Bactrim DS PO BID Sat and Sun for PJP prophylaxis     10) Central Access:   - R Port-A-Cath in place      Research Participant:           [facility] - Source Data         Diagnosis: Ph+ Precursor B-Cell Acute Lymphoblastic Leukemia     Disease Status: EOI1A MRD NEGATIVE, EOI1B RD NEGATIVE, CNS3c, testicular negative, HSV1 IgG POSITIVE, CMV IgG NEGATIVE, VARICELLA IgG POSITIVE     Active Studies: [id number], [id number]                                                                                                      Inactive Studies: [id number]                                                                                                                                                Optional Studies: None             Protocol: International Phase 3 trial in [place] 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      Treatment Plan: [id number](OS), AR-Arm B, Interim Maintenance, Day 11 (Delayed 4 days)     Height: 1.650 m      Weight: 64.9kg       BSA: 1.73 m²   (Interim Maintenance, Day 1 2/27/2023)                                                                                                                                           Performance Status: Karnofsky 70, ECOG  3 (3/9/2023)     Treatment Plan Medications:  (100% adherent with imatinib)     Imatinib dose adjusted for weight at DI, Day 29 to Imatinib 600 mg PO daily in AM 1/17/2023     Evaluations / Study Labs:  (3/13/2023)      WBC 2.0, Hgb 8.8, platelets 43  ANC 1160,   AST 21, ALT 13, total bilirubin  <0.2     Therapy Given: (3/13/2023)     Vincristine 2 mg IV x 1   Imatinib 600 mg PO QAM     Capizzi methotrexate OMITTED due to myelosuppression 3/13/2023         Disposition: Return to clinic in 7 days for Day 21 of Interim Maintenance      Pepe Faye MD  Pediatric Hematology / Oncology  [facility]  Cell.  [phone]  Office. [phone]

## 2023-03-30 NOTE — PROGRESS NOTES
"Pediatric Hematology / Oncology  Progress Note      Patient Name:  Tomas Jean-Baptiste  : 2000   MRN: [id number]    Location of Service:  [facility]  Date of Service: 3/30/2023  Time: 11:09 AM    Primary Care Physician: Margarito Arvizu M.D.    Protocol/Treatment Plan:    HISTORY OF PRESENT ILLNESS:     Chief Complaint: Here for chemotherapy (count dependent).    History of Present Illness: Tomas Jean-Baptiste is a 22 y.o. male who presents to the [facility] for scheduled \"Day 21\" (previously delayed) Interim Maintenance 2 chemotherapy for his Ph+ Acute B-Lymphoblastic Leukemia.     JULY reports that he is doing well.  He is slowly regaining strength in his left upper extremity.  He still has some restricted motion at his shoulder but this is slowly better.  He denies significant pain in the extremity.  Overall, he feels that he is \"doing great.\"    Review of Systems:     Constitutional: Afebrile. Good appetite and energy.  HENT: Negative for URI symptoms, mouth sores.  Eyes: Negative for visual changes.  Respiratory: Negative for shortness of breath or cough.     Gastrointestinal: Negative for nausea, vomiting, abdominal pain, diarrhea, constipation and blood in stool.    Skin: Negative for rash, signs of infection.  Neurological: Negative for headaches.    Endo/Heme/Allergies: Does not bruise/bleed easily.    Psychiatric/Behavioral: No changes in mood, appropriate for age.       PAST MEDICAL HISTORY:     Past Medical History:    Past Medical History:   Diagnosis Date    Cancer (HCC)     Leukoma        Past Surgical History:     Past Surgical History:   Procedure Laterality Date    INCISION AND DRAINAGE GENERAL Left 2023    Procedure: INCISION AND DRAINAGE OF LEFT SHOULDER;  Surgeon: Luke Haddad M.D.;  Location: SURGERY [facility];  Service: General    CATH PLACEMENT Right 2022    Procedure: " "INSERTION, CATHETER;  Surgeon: Simona Moise M.D.;  Location: SURGERY [facility];  Service: Ent          Allergies:   Allergies as of 03/30/2023 - Reviewed 03/30/2023   Allergen Reaction Noted    Amoxicillin Rash 04/03/2020       Home Medications:    Current Outpatient Medications   Medication Sig Dispense Refill    imatinib (GLEEVEC) 400 MG tablet Take 1 Tablet by mouth every day. Pt takes 200 mg + 400 mg for a total dose of 600 mg daily 30 Tablet 5    imatinib (GLEEVEC) 100 MG tablet Take 2 Tablets by mouth every day. Pt takes 200 mg + 400 mg for a total dose of 600 mg daily 60 Tablet 5    Omega-3 Fatty Acids (FISH OIL PO) Take  by mouth.      ondansetron (ZOFRAN ODT) 8 MG TABLET DISPERSIBLE Dissolve 1 Tablet by mouth every 6 hours as needed for Nausea/Vomiting. 10 Tablet 0    senna-docusate (PERICOLACE OR SENOKOT S) 8.6-50 MG Tab Take 1 Tablet by mouth every day. 30 Tablet 0    sulfamethoxazole-trimethoprim (BACTRIM DS) 800-160 MG tablet Take 2 Tablets by mouth in the morning every Sat and Sun.      vitamin D3 (CHOLECALCIFEROL) 1000 Unit (25 mcg) Tab Take 1 Tablet by mouth every day.      therapeutic multivitamin-minerals (THERAGRAN-M) Tab Take 1 Tablet by mouth every day.      famotidine (PEPCID) 20 MG Tab Take 1 Tablet by mouth 2 times a day. (Patient not taking: Reported on 3/30/2023) 60 Tablet 4     Current Facility-Administered Medications   Medication Dose Route Frequency Provider Last Rate Last Admin    heparin lock flush 100 unit/mL injection 500 Units  500 Units Intracatheter PRN Pepe Faye M.D.   500 Units at 03/30/23 1110          OBJECTIVE:     Vitals:   /87   Pulse (!) 129   Temp 36.9 °C (98.5 °F) (Temporal)   Resp 20   Ht 1.639 m (5' 4.53\")   Wt 55.8 kg (123 lb 0.3 oz)   SpO2 97%     Labs:   Latest Reference Range & Units 03/13/23 11:45 03/20/23 09:55 03/27/23 10:00   WBC 4.8 - 10.8 K/uL 2.0 (L) 1.6 (LL) 1.3 (LL)   RBC 4.70 - 6.10 M/uL 3.10 (L) 2.88 (L) 3.09 (L)   Hemoglobin 14.0 " - 18.0 g/dL 8.8 (L) 8.2 (L) 9.0 (L)   Hematocrit 42.0 - 52.0 % 26.8 (L) 25.3 (L) 27.6 (L)   MCV 81.4 - 97.8 fL 86.5 87.8 89.3   MCH 27.0 - 33.0 pg 28.4 28.5 29.1   MCHC 33.7 - 35.3 g/dL 32.8 (L) 32.4 (L) 32.6 (L)   RDW 35.9 - 50.0 fL 51.7 (H) 55.5 (H) 58.0 (H)   Platelet Count 164 - 446 K/uL 43 (L) 40 (L) 46 (L)   MPV 9.0 - 12.9 fL 10.9 11.3 11.0   Neutrophils-Polys 44.00 - 72.00 % 57.40 67.20 44.80   Neutrophils (Absolute) 1.82 - 7.42 K/uL 1.16 (L) 1.08 (L) 0.60 (L)   Lymphocytes 22.00 - 41.00 % 26.70 29.30 39.60   Lymphs (Absolute) 1.00 - 4.80 K/uL 0.54 (L) 0.47 (L) 0.53 (L)   Monocytes 0.00 - 13.40 % 14.40 (H) 2.60 13.40   Basophils 0.00 - 1.80 % 0.00 0.90 0.00   Baso (Absolute) 0.00 - 0.12 K/uL 0.00 0.01 0.00   Immature Granulocytes 0.00 - 0.90 % 1.50 (H)  2.20 (H)     Physical Exam:    Constitutional: Well-developed, well-nourished, and in no distress.    HENT: Normocephalic and atraumatic. No nasal congestion or rhinorrhea. Oropharynx is clear and moist.   Eyes: Conjunctivae are normal. Pupils are equal, round, and reactive to light. No scleral icterus.    Neck: Normal range of motion of neck, no adenopathy.    Cardiovascular: Normal rate, regular rhythm and normal heart sounds.  No murmur heard. Distal cap refill < 2 sec  Pulmonary/Chest: Effort normal and breath sounds normal.  Symmetric expansion.    Abdomen: Soft. Bowel sounds are normal. No distension and no mass. There is no hepatosplenomegaly.    Genitourinary:  Deferred  Musculoskeletal: Limited ROM at left shoulder.  Skin: No rash or evidence of skin infection.  Mild pallor.   Psychiatric: Mood and affect normal for age.      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  Over the last 3 months, however, he has had significant infectious issues including a lengthy hospitalization for  "polymicrobial sepsis and, later, a soft tissue/joint infection with Bacteroides fragilis.  Of note, he has \"CNS 3\" disease, based on cranial nerve palsy on presentation.  This makes him a candidate for cranial radiation, to be scheduled when he began to \"maintenance\" chemotherapy.     Myelosuppression has been a significant issue for the last several weeks.  We have been making adjustments, per protocol.  At today's visit, platelets remain below 50,000.  The protocol dictates that \"therapy\" should be held at this point.  For this reason, we will not administer vincristine or methotrexate at today's visit.  Based on previous discussions with Dr. Faye (along with an email received today from the protocol , Dr. Rhodes), I have advised JULY that he should also stop taking his imatinib.  Finally, because of some concern about myelosuppression from Bactrim, I have advised JULY to hold his PJP prophylaxis this next weekend.      Relevant Medications    heparin lock flush 100 unit/mL injection 500 Units    Encounter for chemotherapy management      Today's chemotherapy will be held on the basis of thrombocytopenia (platelets less than 50,000).      Relevant Orders    CBC WITH DIFFERENTIAL (Completed)    CMP (Completed)    BILIRUBIN DIRECT (Completed)     I reminded JULY to monitor for fever or other signs of infection, as he remains neutropenic.    Unless there are new concerns, he will RTC in a week for repeat blood counts and, tentatively, treatment with vincristine and methotrexate.      ANN MARIE Rodriguez MD  Pediatric Hematology / Oncology  [facility]  Cell.  [phone]  Office. [phone]            "

## 2023-03-30 NOTE — PROGRESS NOTES
Pt to [facility] for lab draw, doctors office visit, and chemotherapy administration.      Afebrile.  VSS.  Awake and alert in no acute distress.      Office visit with Dr. Rodriguez completed.     Port accessed using a 22g 3/4 inch trevino needle with 1 attempt.  Labs drawn from the port without difficulty.   Pt tolerated well.      Platelets 46, preliminary ANC 0.38- chemotherapy delayed one week.       Port flushed per orders (see MAR) and de-accessed after completion. PT home with self.  Will return for next visit on 4/6/23 for chemotherapy.

## 2023-03-30 NOTE — PROGRESS NOTES
Alycia from Lab called with critical result of  at 1150. Critical lab result read back to Alycia.   Dr. Rodriguez notified of critical lab result at 1151.  Critical lab result read back by Dr. Rodriguez.

## 2023-03-30 NOTE — PROGRESS NOTES
Karen from Lab called with critical result of WBC 1.1 at 1054. Critical lab result read back to Karen.   Dr. Rodriguez notified of critical lab result at 1058.  Critical lab result read back by Dr. Rodriguez.

## 2023-04-03 ENCOUNTER — HOSPITAL ENCOUNTER (OUTPATIENT)
Dept: RADIATION ONCOLOGY | Facility: MEDICAL CENTER | Age: 23
End: 2023-04-30
Attending: RADIOLOGY
Payer: COMMERCIAL

## 2023-04-06 ENCOUNTER — HOSPITAL ENCOUNTER (OUTPATIENT)
Dept: INFUSION CENTER | Facility: MEDICAL CENTER | Age: 23
End: 2023-04-06
Attending: PEDIATRICS
Payer: COMMERCIAL

## 2023-04-06 VITALS
RESPIRATION RATE: 20 BRPM | HEIGHT: 65 IN | SYSTOLIC BLOOD PRESSURE: 137 MMHG | BODY MASS INDEX: 20.64 KG/M2 | DIASTOLIC BLOOD PRESSURE: 89 MMHG | HEART RATE: 110 BPM | TEMPERATURE: 98.7 F | WEIGHT: 123.9 LBS | OXYGEN SATURATION: 98 %

## 2023-04-06 DIAGNOSIS — C91.Z0 B LYMPHOBLASTIC LEUKEMIA WITH T(9;22)(Q34;Q11.2);BCR-ABL1 (HCC): ICD-10-CM

## 2023-04-06 DIAGNOSIS — C91.01 ACUTE LYMPHOBLASTIC LEUKEMIA (ALL) IN REMISSION (HCC): ICD-10-CM

## 2023-04-06 DIAGNOSIS — Z51.11 ENCOUNTER FOR CHEMOTHERAPY MANAGEMENT: ICD-10-CM

## 2023-04-06 LAB
ALBUMIN SERPL BCP-MCNC: 3.7 G/DL (ref 3.2–4.9)
ALBUMIN/GLOB SERPL: 1.3 G/DL
ALP SERPL-CCNC: 101 U/L (ref 30–99)
ALT SERPL-CCNC: 11 U/L (ref 2–50)
ANION GAP SERPL CALC-SCNC: 11 MMOL/L (ref 7–16)
ANISOCYTOSIS BLD QL SMEAR: ABNORMAL
AST SERPL-CCNC: 15 U/L (ref 12–45)
BASOPHILS # BLD AUTO: 0 % (ref 0–1.8)
BASOPHILS # BLD: 0 K/UL (ref 0–0.12)
BILIRUB CONJ SERPL-MCNC: <0.2 MG/DL (ref 0.1–0.5)
BILIRUB INDIRECT SERPL-MCNC: NORMAL MG/DL (ref 0–1)
BILIRUB SERPL-MCNC: 0.3 MG/DL (ref 0.1–1.5)
BUN SERPL-MCNC: 6 MG/DL (ref 8–22)
CALCIUM ALBUM COR SERPL-MCNC: 8.9 MG/DL (ref 8.5–10.5)
CALCIUM SERPL-MCNC: 8.7 MG/DL (ref 8.5–10.5)
CHLORIDE SERPL-SCNC: 108 MMOL/L (ref 96–112)
CO2 SERPL-SCNC: 23 MMOL/L (ref 20–33)
CREAT SERPL-MCNC: 0.38 MG/DL (ref 0.5–1.4)
EOSINOPHIL # BLD AUTO: 0 K/UL (ref 0–0.51)
EOSINOPHIL NFR BLD: 0 % (ref 0–6.9)
ERYTHROCYTE [DISTWIDTH] IN BLOOD BY AUTOMATED COUNT: 76 FL (ref 35.9–50)
GFR SERPLBLD CREATININE-BSD FMLA CKD-EPI: 161 ML/MIN/1.73 M 2
GLOBULIN SER CALC-MCNC: 2.9 G/DL (ref 1.9–3.5)
GLUCOSE SERPL-MCNC: 110 MG/DL (ref 65–99)
HCT VFR BLD AUTO: 25.5 % (ref 42–52)
HGB BLD-MCNC: 8.3 G/DL (ref 14–18)
LYMPHOCYTES # BLD AUTO: 0.52 K/UL (ref 1–4.8)
LYMPHOCYTES NFR BLD: 43.3 % (ref 22–41)
MACROCYTES BLD QL SMEAR: ABNORMAL
MANUAL DIFF BLD: NORMAL
MCH RBC QN AUTO: 30.7 PG (ref 27–33)
MCHC RBC AUTO-ENTMCNC: 32.5 G/DL (ref 33.7–35.3)
MCV RBC AUTO: 94.4 FL (ref 81.4–97.8)
MONOCYTES # BLD AUTO: 0.23 K/UL (ref 0–0.85)
MONOCYTES NFR BLD AUTO: 18.9 % (ref 0–13.4)
MORPHOLOGY BLD-IMP: NORMAL
NEUTROPHILS # BLD AUTO: 0.45 K/UL (ref 1.82–7.42)
NEUTROPHILS NFR BLD: 37.8 % (ref 44–72)
NRBC # BLD AUTO: 0.02 K/UL
NRBC BLD-RTO: 1.7 /100 WBC
OVALOCYTES BLD QL SMEAR: NORMAL
PLATELET # BLD AUTO: 63 K/UL (ref 164–446)
PLATELET BLD QL SMEAR: NORMAL
PMV BLD AUTO: 11 FL (ref 9–12.9)
POIKILOCYTOSIS BLD QL SMEAR: NORMAL
POLYCHROMASIA BLD QL SMEAR: NORMAL
POTASSIUM SERPL-SCNC: 3.4 MMOL/L (ref 3.6–5.5)
PROT SERPL-MCNC: 6.6 G/DL (ref 6–8.2)
RBC # BLD AUTO: 2.7 M/UL (ref 4.7–6.1)
RBC BLD AUTO: PRESENT
SODIUM SERPL-SCNC: 142 MMOL/L (ref 135–145)
WBC # BLD AUTO: 1.2 K/UL (ref 4.8–10.8)

## 2023-04-06 PROCEDURE — 82248 BILIRUBIN DIRECT: CPT

## 2023-04-06 PROCEDURE — 80053 COMPREHEN METABOLIC PANEL: CPT

## 2023-04-06 PROCEDURE — 85025 COMPLETE CBC W/AUTO DIFF WBC: CPT

## 2023-04-06 PROCEDURE — 85007 BL SMEAR W/DIFF WBC COUNT: CPT

## 2023-04-06 PROCEDURE — A4212 NON CORING NEEDLE OR STYLET: HCPCS

## 2023-04-06 PROCEDURE — 99214 OFFICE O/P EST MOD 30 MIN: CPT | Performed by: PEDIATRICS

## 2023-04-06 PROCEDURE — 700111 HCHG RX REV CODE 636 W/ 250 OVERRIDE (IP): Performed by: PEDIATRICS

## 2023-04-06 PROCEDURE — 36591 DRAW BLOOD OFF VENOUS DEVICE: CPT

## 2023-04-06 RX ORDER — ONDANSETRON 2 MG/ML
8 INJECTION INTRAMUSCULAR; INTRAVENOUS ONCE
Status: DISCONTINUED | OUTPATIENT
Start: 2023-04-06 | End: 2023-04-07 | Stop reason: HOSPADM

## 2023-04-06 RX ORDER — ONDANSETRON 2 MG/ML
8 INJECTION INTRAMUSCULAR; INTRAVENOUS ONCE
Status: CANCELLED | OUTPATIENT
Start: 2023-04-11

## 2023-04-06 RX ORDER — 0.9 % SODIUM CHLORIDE 0.9 %
20 VIAL (ML) INJECTION PRN
Status: CANCELLED | OUTPATIENT
Start: 2023-04-06

## 2023-04-06 RX ORDER — HEPARIN SODIUM (PORCINE) LOCK FLUSH IV SOLN 100 UNIT/ML 100 UNIT/ML
500 SOLUTION INTRAVENOUS PRN
Status: CANCELLED | OUTPATIENT
Start: 2023-04-06

## 2023-04-06 RX ORDER — HEPARIN SODIUM (PORCINE) LOCK FLUSH IV SOLN 100 UNIT/ML 100 UNIT/ML
500 SOLUTION INTRAVENOUS PRN
Status: DISCONTINUED | OUTPATIENT
Start: 2023-04-06 | End: 2023-04-07 | Stop reason: HOSPADM

## 2023-04-06 RX ORDER — HEPARIN SODIUM,PORCINE 10 UNIT/ML
30 VIAL (ML) INTRAVENOUS PRN
Status: CANCELLED | OUTPATIENT
Start: 2023-04-06

## 2023-04-06 RX ADMIN — HEPARIN 500 UNITS: 100 SYRINGE at 12:31

## 2023-04-06 ASSESSMENT — FIBROSIS 4 INDEX: FIB4 SCORE: 2.66

## 2023-04-06 NOTE — PROGRESS NOTES
Luke from Lab called with critical result of WBC 1.2, ANC 0.34 at 1108. Critical lab result read back to Luke.   Dr. Faye notified of critical lab result at 1109.  Critical lab result read back by Dr. Faye.

## 2023-04-06 NOTE — PROGRESS NOTES
Pt to [facility] for lab draw, doctors office visit, and chemotherapy administration.      Afebrile.  VSS.  Awake and alert in no acute distress.      Port accessed using a 22g 3/4 inch trevino needle with 1 attempt.  Labs drawn from the port without difficulty.   Pt tolerated well.      Platelets 36, preliminary ANC 0.34- Dr. Faye to bedside. Labs reviewed, chemotherapy to be delayed until Tuesday 4/11/23.       Port flushed per orders (see MAR) and de-accessed after completion. PT home with self.  Will return for next visit on 4/11/23 for chemotherapy.

## 2023-04-11 ENCOUNTER — HOSPITAL ENCOUNTER (OUTPATIENT)
Dept: INFUSION CENTER | Facility: MEDICAL CENTER | Age: 23
End: 2023-04-11
Attending: PEDIATRICS
Payer: COMMERCIAL

## 2023-04-11 VITALS
RESPIRATION RATE: 20 BRPM | DIASTOLIC BLOOD PRESSURE: 75 MMHG | TEMPERATURE: 98.1 F | HEIGHT: 65 IN | BODY MASS INDEX: 21.08 KG/M2 | WEIGHT: 126.54 LBS | OXYGEN SATURATION: 96 % | HEART RATE: 110 BPM | SYSTOLIC BLOOD PRESSURE: 129 MMHG

## 2023-04-11 DIAGNOSIS — Z51.11 ENCOUNTER FOR CHEMOTHERAPY MANAGEMENT: ICD-10-CM

## 2023-04-11 DIAGNOSIS — C91.Z0 B LYMPHOBLASTIC LEUKEMIA WITH T(9;22)(Q34;Q11.2);BCR-ABL1 (HCC): ICD-10-CM

## 2023-04-11 DIAGNOSIS — C91.01 ACUTE LYMPHOBLASTIC LEUKEMIA (ALL) IN REMISSION (HCC): ICD-10-CM

## 2023-04-11 LAB
ALBUMIN SERPL BCP-MCNC: 3.8 G/DL (ref 3.2–4.9)
ALBUMIN/GLOB SERPL: 1.4 G/DL
ALP SERPL-CCNC: 99 U/L (ref 30–99)
ALT SERPL-CCNC: 9 U/L (ref 2–50)
ANION GAP SERPL CALC-SCNC: 9 MMOL/L (ref 7–16)
ANISOCYTOSIS BLD QL SMEAR: ABNORMAL
AST SERPL-CCNC: 18 U/L (ref 12–45)
BASOPHILS # BLD AUTO: 0.8 % (ref 0–1.8)
BASOPHILS # BLD: 0.02 K/UL (ref 0–0.12)
BILIRUB CONJ SERPL-MCNC: <0.2 MG/DL (ref 0.1–0.5)
BILIRUB INDIRECT SERPL-MCNC: NORMAL MG/DL (ref 0–1)
BILIRUB SERPL-MCNC: 0.3 MG/DL (ref 0.1–1.5)
BUN SERPL-MCNC: 8 MG/DL (ref 8–22)
CALCIUM ALBUM COR SERPL-MCNC: 8.9 MG/DL (ref 8.5–10.5)
CALCIUM SERPL-MCNC: 8.7 MG/DL (ref 8.5–10.5)
CHLORIDE SERPL-SCNC: 104 MMOL/L (ref 96–112)
CO2 SERPL-SCNC: 27 MMOL/L (ref 20–33)
CREAT SERPL-MCNC: 0.4 MG/DL (ref 0.5–1.4)
EOSINOPHIL # BLD AUTO: 0.02 K/UL (ref 0–0.51)
EOSINOPHIL NFR BLD: 0.8 % (ref 0–6.9)
ERYTHROCYTE [DISTWIDTH] IN BLOOD BY AUTOMATED COUNT: 82.6 FL (ref 35.9–50)
GFR SERPLBLD CREATININE-BSD FMLA CKD-EPI: 159 ML/MIN/1.73 M 2
GLOBULIN SER CALC-MCNC: 2.7 G/DL (ref 1.9–3.5)
GLUCOSE SERPL-MCNC: 99 MG/DL (ref 65–99)
HCT VFR BLD AUTO: 25.7 % (ref 42–52)
HGB BLD-MCNC: 8.3 G/DL (ref 14–18)
LYMPHOCYTES # BLD AUTO: 0.67 K/UL (ref 1–4.8)
LYMPHOCYTES NFR BLD: 35 % (ref 22–41)
MACROCYTES BLD QL SMEAR: ABNORMAL
MANUAL DIFF BLD: NORMAL
MCH RBC QN AUTO: 31.3 PG (ref 27–33)
MCHC RBC AUTO-ENTMCNC: 32.3 G/DL (ref 33.7–35.3)
MCV RBC AUTO: 97 FL (ref 81.4–97.8)
MICROCYTES BLD QL SMEAR: ABNORMAL
MONOCYTES # BLD AUTO: 0.26 K/UL (ref 0–0.85)
MONOCYTES NFR BLD AUTO: 13.7 % (ref 0–13.4)
MORPHOLOGY BLD-IMP: NORMAL
MYELOCYTES NFR BLD MANUAL: 0.9 %
NEUTROPHILS # BLD AUTO: 0.91 K/UL (ref 1.82–7.42)
NEUTROPHILS NFR BLD: 47 % (ref 44–72)
NEUTS BAND NFR BLD MANUAL: 0.9 % (ref 0–10)
NRBC # BLD AUTO: 0.02 K/UL
NRBC BLD-RTO: 1 /100 WBC
PLATELET # BLD AUTO: 77 K/UL (ref 164–446)
PLATELET BLD QL SMEAR: NORMAL
PMV BLD AUTO: 11.4 FL (ref 9–12.9)
POLYCHROMASIA BLD QL SMEAR: NORMAL
POTASSIUM SERPL-SCNC: 4 MMOL/L (ref 3.6–5.5)
PROMYELOCYTES NFR BLD MANUAL: 0.9 %
PROT SERPL-MCNC: 6.5 G/DL (ref 6–8.2)
RBC # BLD AUTO: 2.65 M/UL (ref 4.7–6.1)
RBC BLD AUTO: PRESENT
SODIUM SERPL-SCNC: 140 MMOL/L (ref 135–145)
WBC # BLD AUTO: 1.9 K/UL (ref 4.8–10.8)

## 2023-04-11 PROCEDURE — 96409 CHEMO IV PUSH SNGL DRUG: CPT

## 2023-04-11 PROCEDURE — A4212 NON CORING NEEDLE OR STYLET: HCPCS

## 2023-04-11 PROCEDURE — 85007 BL SMEAR W/DIFF WBC COUNT: CPT

## 2023-04-11 PROCEDURE — 700111 HCHG RX REV CODE 636 W/ 250 OVERRIDE (IP): Performed by: PEDIATRICS

## 2023-04-11 PROCEDURE — 85025 COMPLETE CBC W/AUTO DIFF WBC: CPT

## 2023-04-11 PROCEDURE — 700105 HCHG RX REV CODE 258: Performed by: PEDIATRICS

## 2023-04-11 PROCEDURE — 96411 CHEMO IV PUSH ADDL DRUG: CPT

## 2023-04-11 PROCEDURE — 99214 OFFICE O/P EST MOD 30 MIN: CPT | Performed by: PEDIATRICS

## 2023-04-11 PROCEDURE — 82248 BILIRUBIN DIRECT: CPT

## 2023-04-11 PROCEDURE — 96375 TX/PRO/DX INJ NEW DRUG ADDON: CPT

## 2023-04-11 PROCEDURE — 36591 DRAW BLOOD OFF VENOUS DEVICE: CPT

## 2023-04-11 PROCEDURE — 80053 COMPREHEN METABOLIC PANEL: CPT

## 2023-04-11 RX ORDER — 0.9 % SODIUM CHLORIDE 0.9 %
20 VIAL (ML) INJECTION PRN
Status: CANCELLED | OUTPATIENT
Start: 2023-04-11

## 2023-04-11 RX ORDER — ONDANSETRON 2 MG/ML
8 INJECTION INTRAMUSCULAR; INTRAVENOUS ONCE
Status: COMPLETED | OUTPATIENT
Start: 2023-04-11 | End: 2023-04-11

## 2023-04-11 RX ORDER — HEPARIN SODIUM (PORCINE) LOCK FLUSH IV SOLN 100 UNIT/ML 100 UNIT/ML
500 SOLUTION INTRAVENOUS PRN
Status: CANCELLED | OUTPATIENT
Start: 2023-04-11

## 2023-04-11 RX ORDER — HEPARIN SODIUM,PORCINE 10 UNIT/ML
30 VIAL (ML) INTRAVENOUS PRN
Status: CANCELLED | OUTPATIENT
Start: 2023-04-11

## 2023-04-11 RX ORDER — HEPARIN SODIUM (PORCINE) LOCK FLUSH IV SOLN 100 UNIT/ML 100 UNIT/ML
500 SOLUTION INTRAVENOUS PRN
Status: DISCONTINUED | OUTPATIENT
Start: 2023-04-11 | End: 2023-04-12 | Stop reason: HOSPADM

## 2023-04-11 RX ADMIN — ONDANSETRON 8 MG: 2 INJECTION INTRAMUSCULAR; INTRAVENOUS at 13:01

## 2023-04-11 RX ADMIN — HEPARIN 500 UNITS: 100 SYRINGE at 13:35

## 2023-04-11 RX ADMIN — SODIUM CHLORIDE 138.5 MG: 9 INJECTION, SOLUTION INTRAVENOUS at 13:15

## 2023-04-11 RX ADMIN — VINCRISTINE SULFATE 2 MG: 1 INJECTION, SOLUTION INTRAVENOUS at 13:10

## 2023-04-11 ASSESSMENT — FIBROSIS 4 INDEX: FIB4 SCORE: 1.507556722888818113

## 2023-04-11 NOTE — PROGRESS NOTES
"Pharmacy chemotherapy verification    Patient Name: Tomas Jean-Baptiste   Dx: pH+ B-Cell ALL         Protocol: Capizzi MTX IM (On Study Arm B, ID number: [id number])         Allergies:  Amoxicillin       /75   Pulse (!) 110   Temp 36.7 °C (98.1 °F) (Temporal)   Resp 20   Ht 1.652 m (5' 5.04\")   Wt 57.4 kg (126 lb 8.7 oz)   SpO2 96%   BMI 21.03 kg/m²      4/11/23: Body surface area is 1.62 meters squared.    Weight Type Height Weight BSA Additional Details   Treatment plan 165.1 cm 64.9 kg 1.73 m² Documented as of 2/27/2023         Drug Order   (Drug name, dose, route, IV Fluid & volume, frequency, number of doses) Cycle: IM D21 - delayed d/t counts   Previous treatment: IM D11 - VCR ONLY on 3/13/23    Medication = methotrexate  Base Dose= 12mg fixed dose  Calc Dose: n/a  Final Dose = 12mg  Route = INTRATHECAL  Fluid & Volume = PF NS 6mL  Admin Duration = IT per MD   Day 1 and 31       No calculation required  ok to treat with final dose   Medication = Vincristine (ONCOVIN)   Base Dose= 1.5 mg/m2  Calc Dose: Base Dose x 1.62 m2 = 2.415  Final Dose = 2 mg (MAX)   Route = IV  Fluid & Volume = NS 25 mL  Admin Duration = Over 5 - 10 minutes    Days 1,11,21,31,and 41       Treat with MAX dose    Medication = Methotrexate PF  Base Dose = 80 mg/m2 (Dose Reduction per Dr Faye)   Calc Dose:Base Dose x 1.62 m2 = 129.6 mg  Final Dose = 138.5 mg   Route = IV  Fluid & Volume = NS 50 mL  Admin Duration = Over 15 minutes    Days 1,11,21,31,and 41        < 10% difference, treat with final dose    Medication = pegaspargase  Base Dose= 2500 units/m2  Calc Dose: Base Dose x 1.66 m2 = 4150 units  Final Dose = 4323.25 units  Route = IV  Fluid & Volume =  mL  Admin Duration = Over 2 hours   Days 2 and 22       <10% difference, ok to treat with final dose   By my signature below, I confirm this process was performed independently with the BSA and all final chemotherapy dosing calculations congruent. I have reviewed " the above chemotherapy order and that my calculation of the final dose and BSA (when applicable) corroborate those calculations of the  pharmacist. Discrepancies of 10% or greater in the written dose have been addressed and documented within the EPIC Progress notes.    Xochilt DriverD

## 2023-04-11 NOTE — PROGRESS NOTES
"Pharmacy Chemotherapy Calculations Note:    Dx: B-ALL  Cycle:  Interim Maintenance I Day 21, delayed 4 weeks for low counts   Previous treatment: IM D11 (VCR only) on 3/13/23     Protocol: Interim Maintenance per Arm B of [id number] [place]: [id number]     4/11/23: Mtx dose reduction today, 80 mg/m2, per Dr Faye       /75   Pulse (!) 110   Temp 36.7 °C (98.1 °F) (Temporal)   Resp 20   Ht 1.652 m (5' 5.04\")   Wt 57.4 kg (126 lb 8.7 oz)   SpO2 96%   BMI 21.03 kg/m²  Body surface area is 1.62 meters squared.    Labs from 4/11/23 reviewed - all within treatment parameters. Okay to proceed per Dr Faye, 20% dose reduction on mtx       Vincristine 1.5 mg/m² (max 2 mg) x 1.62 m² = 2.43 mg   Max 2 mg, ok for final dose = 2 mg IV    Methotrexate 80 mg/m² x 1.62 m² = 129.6 mg   <10% difference, okay to treat with final dose = 138.5 mg IV        Judy Bryant, PharmD, BCOP            "

## 2023-04-11 NOTE — PROGRESS NOTES
Pt to [facility] for lab draw, doctors office visit, and chemotherapy administration.      Afebrile.  VSS.  Awake and alert in no acute distress.      Port accessed using a 22 g 3/4 inch trevino needle with 1 attempt.  Labs drawn from the port without difficulty.   Pt tolerated well.      Chemotherapy dosage calculated independently by Huong Foster, MONTSERRAT and Dayna Damian RN and compared to road map for protocol [id number] Arm B.  Calculations within 10% of written order.  Lab results reviewed.      Premedications (zofran x1) and chemo given as ordered, see MAR.  Blood return verified prior to, during, and after chemotherapy infusion.  See Chemotherapy flowsheet.      PT tolerated well.  No side effects or complications noted.  Port flushed per orders (see MAR) and de-accessed after completion. PT home with self.  Will return for next visit on 4/12/23.

## 2023-04-12 ENCOUNTER — HOSPITAL ENCOUNTER (OUTPATIENT)
Dept: RADIATION ONCOLOGY | Facility: MEDICAL CENTER | Age: 23
End: 2023-04-12

## 2023-04-12 ENCOUNTER — HOSPITAL ENCOUNTER (OUTPATIENT)
Dept: INFUSION CENTER | Facility: MEDICAL CENTER | Age: 23
End: 2023-04-12
Attending: PEDIATRICS
Payer: COMMERCIAL

## 2023-04-12 VITALS
DIASTOLIC BLOOD PRESSURE: 88 MMHG | TEMPERATURE: 98.2 F | OXYGEN SATURATION: 100 % | WEIGHT: 125.88 LBS | BODY MASS INDEX: 20.97 KG/M2 | HEIGHT: 65 IN | SYSTOLIC BLOOD PRESSURE: 133 MMHG | HEART RATE: 112 BPM | RESPIRATION RATE: 20 BRPM

## 2023-04-12 DIAGNOSIS — C91.01 ACUTE LYMPHOBLASTIC LEUKEMIA (ALL) IN REMISSION (HCC): ICD-10-CM

## 2023-04-12 DIAGNOSIS — C91.Z0 B LYMPHOBLASTIC LEUKEMIA WITH T(9;22)(Q34;Q11.2);BCR-ABL1 (HCC): ICD-10-CM

## 2023-04-12 DIAGNOSIS — Z51.11 ENCOUNTER FOR CHEMOTHERAPY MANAGEMENT: ICD-10-CM

## 2023-04-12 DIAGNOSIS — T45.1X5A CINV (CHEMOTHERAPY-INDUCED NAUSEA AND VOMITING): ICD-10-CM

## 2023-04-12 DIAGNOSIS — R11.2 CINV (CHEMOTHERAPY-INDUCED NAUSEA AND VOMITING): ICD-10-CM

## 2023-04-12 LAB — GLUCOSE BLD STRIP.AUTO-MCNC: 107 MG/DL (ref 65–99)

## 2023-04-12 PROCEDURE — 700105 HCHG RX REV CODE 258: Performed by: PEDIATRICS

## 2023-04-12 PROCEDURE — 306780 HCHG STAT FOR TRANSFUSION PER CASE

## 2023-04-12 PROCEDURE — 96413 CHEMO IV INFUSION 1 HR: CPT

## 2023-04-12 PROCEDURE — 96415 CHEMO IV INFUSION ADDL HR: CPT

## 2023-04-12 PROCEDURE — 96375 TX/PRO/DX INJ NEW DRUG ADDON: CPT

## 2023-04-12 PROCEDURE — 82962 GLUCOSE BLOOD TEST: CPT

## 2023-04-12 PROCEDURE — 700111 HCHG RX REV CODE 636 W/ 250 OVERRIDE (IP): Performed by: PEDIATRICS

## 2023-04-12 PROCEDURE — A4212 NON CORING NEEDLE OR STYLET: HCPCS

## 2023-04-12 RX ORDER — ONDANSETRON 2 MG/ML
8 INJECTION INTRAMUSCULAR; INTRAVENOUS EVERY 8 HOURS PRN
Status: DISCONTINUED | OUTPATIENT
Start: 2023-04-12 | End: 2023-04-13 | Stop reason: HOSPADM

## 2023-04-12 RX ORDER — LORAZEPAM 2 MG/ML
1 CONCENTRATE ORAL EVERY 6 HOURS PRN
Status: CANCELLED | OUTPATIENT
Start: 2023-04-15

## 2023-04-12 RX ORDER — LORAZEPAM 2 MG/ML
1 INJECTION INTRAMUSCULAR ONCE
Status: COMPLETED | OUTPATIENT
Start: 2023-04-12 | End: 2023-04-12

## 2023-04-12 RX ORDER — DIPHENHYDRAMINE HYDROCHLORIDE 50 MG/ML
50 INJECTION INTRAMUSCULAR; INTRAVENOUS ONCE
Status: COMPLETED | OUTPATIENT
Start: 2023-04-12 | End: 2023-04-12

## 2023-04-12 RX ORDER — HEPARIN SODIUM,PORCINE 10 UNIT/ML
30 VIAL (ML) INTRAVENOUS PRN
Status: CANCELLED | OUTPATIENT
Start: 2023-04-12

## 2023-04-12 RX ORDER — 0.9 % SODIUM CHLORIDE 0.9 %
20 VIAL (ML) INJECTION PRN
Status: CANCELLED | OUTPATIENT
Start: 2023-04-12

## 2023-04-12 RX ORDER — EPINEPHRINE 1 MG/ML(1)
0.5 AMPUL (ML) INJECTION PRN
Status: DISCONTINUED | OUTPATIENT
Start: 2023-04-12 | End: 2023-04-13 | Stop reason: HOSPADM

## 2023-04-12 RX ORDER — HEPARIN SODIUM (PORCINE) LOCK FLUSH IV SOLN 100 UNIT/ML 100 UNIT/ML
500 SOLUTION INTRAVENOUS PRN
Status: DISCONTINUED | OUTPATIENT
Start: 2023-04-12 | End: 2023-04-13 | Stop reason: HOSPADM

## 2023-04-12 RX ORDER — DIPHENHYDRAMINE HYDROCHLORIDE 50 MG/ML
50 INJECTION INTRAMUSCULAR; INTRAVENOUS PRN
Status: CANCELLED | OUTPATIENT
Start: 2023-04-15

## 2023-04-12 RX ORDER — EPINEPHRINE 1 MG/ML(1)
0.5 AMPUL (ML) INJECTION PRN
Status: CANCELLED | OUTPATIENT
Start: 2023-04-15

## 2023-04-12 RX ORDER — SODIUM CHLORIDE 9 MG/ML
500 INJECTION, SOLUTION INTRAVENOUS CONTINUOUS
Status: CANCELLED | OUTPATIENT
Start: 2023-04-15

## 2023-04-12 RX ORDER — ONDANSETRON 2 MG/ML
8 INJECTION INTRAMUSCULAR; INTRAVENOUS EVERY 8 HOURS PRN
Status: CANCELLED | OUTPATIENT
Start: 2023-04-15

## 2023-04-12 RX ORDER — DIPHENHYDRAMINE HYDROCHLORIDE 50 MG/ML
50 INJECTION INTRAMUSCULAR; INTRAVENOUS PRN
Status: DISCONTINUED | OUTPATIENT
Start: 2023-04-12 | End: 2023-04-13 | Stop reason: HOSPADM

## 2023-04-12 RX ORDER — HEPARIN SODIUM (PORCINE) LOCK FLUSH IV SOLN 100 UNIT/ML 100 UNIT/ML
500 SOLUTION INTRAVENOUS PRN
Status: CANCELLED | OUTPATIENT
Start: 2023-04-12

## 2023-04-12 RX ORDER — LORAZEPAM 2 MG/ML
1 CONCENTRATE ORAL EVERY 6 HOURS PRN
Status: DISCONTINUED | OUTPATIENT
Start: 2023-04-12 | End: 2023-04-13 | Stop reason: HOSPADM

## 2023-04-12 RX ADMIN — DIPHENHYDRAMINE HYDROCHLORIDE 50 MG: 50 INJECTION, SOLUTION INTRAMUSCULAR; INTRAVENOUS at 10:58

## 2023-04-12 RX ADMIN — FAMOTIDINE 15 MG: 10 INJECTION INTRAVENOUS at 10:58

## 2023-04-12 RX ADMIN — HEPARIN 500 UNITS: 100 SYRINGE at 15:33

## 2023-04-12 RX ADMIN — PEGASPARGASE 4325.25 UNITS: 750 INJECTION, SOLUTION INTRAMUSCULAR; INTRAVENOUS at 12:03

## 2023-04-12 RX ADMIN — LORAZEPAM 1 MG: 2 INJECTION INTRAMUSCULAR; INTRAVENOUS at 10:58

## 2023-04-12 ASSESSMENT — FIBROSIS 4 INDEX: FIB4 SCORE: 1.636363636363636364

## 2023-04-12 NOTE — PROGRESS NOTES
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 diagnostic LP with WBC 1, RBC 3 and no evidence of leukemic blasts 5/30/2022  Steroid Pre-treatment: None  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     All inclusion/exclusion criteria for [id number] met and consent signed - enrolled 5/29/2022   All inclusion/exclusion criteria for [id number] met and consent signed - enrolled 5/30/2022  Confirmatory bone marrow aspirate and biopsy and diagnostic LP + cytarabine 70 mg IT 5/30/2022  Induction therapy (ON STUDY [id number]) started 5/30/2022  Bone marrow immunohistochemistry consistent with diagnosis of B-ALL comprising 90% of marrow cellularity  Bone marrow sample sent to [facility] for COG purposes:  Flow cytom  Of the blood pressure little high that is a problem is a cultural problem is well and cultural genetic and everything else like that unfortunately breathalyzers such bad heart  disease diabetes things like that  populations etry consistent with peripheral blood, cytogenetics remarkable for known t(9;22)  CSF with WBC 1, RBC 3, no blasts identified on cytospin  FISH results available 5/31/2022 making patient eligible for transfer from [place] [address] to [place] [address] as eligibility requirements were met for [place] [address]  Patient unenrolled from [place] [address] (BCR-ABL1 fusion positive) 6/1/2022  Consent signed for [place] [address] and patient enrolled 6/1/2022 (see eligibility checklist from 5/31/2022 and consent note from 6/1/2022)  Imatinib 400 mg PO QAM / 200 mg PO QPM started 6/3/2022 (allowed per [place] [address])  Patient completed the first 15 days of a Standard 4-drug Induction on 6/13/2022  Start of [place] [address](OS), Induction IA Part 2, Day 15 6/13/2022  End of Induction 1A Part 2 - MRD negative  Start of [place] [address](OS), Induction IA Part 2, Day 15 7/5/2022  Induction IB DELAYED 2 weeks 14 days from 7/26/2022-8/9/2022) for myelosuppression - Start of Day 22 cytarabine block 8/9/2022  Induction IB Day 42 delayed 9 days for myelosuppression - Day 42 evaluations 9/7/2022  End of Induction IB - Flow cytometric MRD negative, MRD by IgH-TCR PCR [id number]%  Randomization to AR-Experimental Arm B of [place] [address]  Start of AR-Experimental Arm B, Interim Maintenance 9/29/2022  Weight based increase in dose of imatinib to 400 mg PO AM and 250 mg PM 9/29/2022  Thrombocytopenia not permissive of proceeding with Day 15 of Interim Maintenance  AR-Experimental Arm B, Interim Maintenance, Day 15 delayed 4 days, start 10/17/2022  AR-Experimental Arm B, Interim Maintenance, Day 29, start 11/1/2022  AR-Experimental Arm B, Interim Maintenance, Day 43, start 11/14/2022  Last does of 6-MP 11/27/2022  AR-Experimental Arm B, Delayed Intensification, Day 1, start 12/6/2022  Admission with Severe Septic Shock 12/27/2022  Imatinib HELD 12/27/2022-1/8/2023  AR-Experimental Arm B, Delayed Intensification, Day 29 DELAYED 14 days with  start 1/17/2023  Hospitalization 2/7/2023 on Day 50 of Delayed Intensification with left shoulder pain ultimately diagnosed with Bacteroides fragilis infection  AR-Experimental Arm B, Interim Maintenance, Day 1 DELAYED 7 days with start 2/27/2023  AR-Experimental Arm B, Interim Maintenance, Day 11 DELAYED 4 days for platelets 43K on 3/9/2023  AR-Experimental Arm B, Interim Maintenance, Day 11 VCR given and MTX omitted for platelets 43K 3/13/2023  Imatinib HELD 3/30/2023-PRESENT (will resume today)  AR-Experimental Arm B, Interim Maintenance, Day 21 (DELAYED NOW 22 DAYS)     Past Medical History:    1) Previously Healthy  2) 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  3) Hyperleukocytosis  4) Hyperuricemia  5) Hyperphosphatemia  6) Right Lower Extremity Superficial Thrombus  7) Subsegmental Pulmonary Embolism  8) 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   Two dogs.  Everyone is well in the house. Father not involved.     Family History:     Family History             Family History   Problem Relation Age of Onset    No Known Problems Mother      Diabetes Paternal Grandfather      Hypertension Paternal Grandfather      Hyperlipidemia Paternal Grandfather      Cancer Neg Hx      Heart Disease Neg Hx      Stroke Neg Hx           No significant family history of cancer.  Both maternal and paternal family history of diabetes.     Immunizations:   "UTD    Medications:   Current Outpatient Medications on File Prior to Encounter   Medication Sig Dispense Refill    imatinib (GLEEVEC) 400 MG tablet Take 1 Tablet by mouth every day. Pt takes 200 mg + 400 mg for a total dose of 600 mg daily 30 Tablet 5    imatinib (GLEEVEC) 100 MG tablet Take 2 Tablets by mouth every day. Pt takes 200 mg + 400 mg for a total dose of 600 mg daily 60 Tablet 5    Omega-3 Fatty Acids (FISH OIL PO) Take  by mouth.      famotidine (PEPCID) 20 MG Tab Take 1 Tablet by mouth 2 times a day. (Patient not taking: Reported on 3/30/2023) 60 Tablet 4    ondansetron (ZOFRAN ODT) 8 MG TABLET DISPERSIBLE Dissolve 1 Tablet by mouth every 6 hours as needed for Nausea/Vomiting. 10 Tablet 0    senna-docusate (PERICOLACE OR SENOKOT S) 8.6-50 MG Tab Take 1 Tablet by mouth every day. 30 Tablet 0    sulfamethoxazole-trimethoprim (BACTRIM DS) 800-160 MG tablet Take 2 Tablets by mouth in the morning every Sat and Sun.      vitamin D3 (CHOLECALCIFEROL) 1000 Unit (25 mcg) Tab Take 1 Tablet by mouth every day.      therapeutic multivitamin-minerals (THERAGRAN-M) Tab Take 1 Tablet by mouth every day.       No current facility-administered medications on file prior to encounter.     OBJECTIVE:     Vitals:   /75   Pulse (!) 110   Temp 36.7 °C (98.1 °F) (Temporal)   Resp 20   Ht 1.652 m (5' 5.04\")   Wt 57.4 kg (126 lb 8.7 oz)   SpO2 96%     Labs:  Hospital Outpatient Visit on 04/11/2023   Component Date Value    WBC 04/11/2023 1.9 (LL)     RBC 04/11/2023 2.65 (L)     Hemoglobin 04/11/2023 8.3 (L)     Hematocrit 04/11/2023 25.7 (L)     MCV 04/11/2023 97.0     MCH 04/11/2023 31.3     MCHC 04/11/2023 32.3 (L)     RDW 04/11/2023 82.6 (H)     Platelet Count 04/11/2023 77 (L)     MPV 04/11/2023 11.4     Neutrophils-Polys 04/11/2023 47.00     Lymphocytes 04/11/2023 35.00     Monocytes 04/11/2023 13.70 (H)     Eosinophils 04/11/2023 0.80     Basophils 04/11/2023 0.80     Nucleated RBC 04/11/2023 1.00     " Neutrophils (Absolute) 04/11/2023 0.91 (L)     Lymphs (Absolute) 04/11/2023 0.67 (L)     Monos (Absolute) 04/11/2023 0.26     Eos (Absolute) 04/11/2023 0.02     Baso (Absolute) 04/11/2023 0.02     NRBC (Absolute) 04/11/2023 0.02     Anisocytosis 04/11/2023 2+ (A)     Macrocytosis 04/11/2023 2+ (A)     Microcytosis 04/11/2023 1+     Sodium 04/11/2023 140     Potassium 04/11/2023 4.0     Chloride 04/11/2023 104     Co2 04/11/2023 27     Anion Gap 04/11/2023 9.0     Glucose 04/11/2023 99     Bun 04/11/2023 8     Creatinine 04/11/2023 0.40 (L)     Calcium 04/11/2023 8.7     AST(SGOT) 04/11/2023 18     ALT(SGPT) 04/11/2023 9     Alkaline Phosphatase 04/11/2023 99     Total Bilirubin 04/11/2023 0.3     Albumin 04/11/2023 3.8     Total Protein 04/11/2023 6.5     Globulin 04/11/2023 2.7     A-G Ratio 04/11/2023 1.4     Direct Bilirubin 04/11/2023 <0.2     Indirect Bilirubin 04/11/2023 see below     GFR (CKD-EPI) 04/11/2023 159     Correct Calcium 04/11/2023 8.9     Bands-Stabs 04/11/2023 0.90     Myelocytes 04/11/2023 0.90     Progranulocytes 04/11/2023 0.90     Manual Diff Status 04/11/2023 PERFORMED     Peripheral Smear Review 04/11/2023 see below     Plt Estimation 04/11/2023 Decreased     RBC Morphology 04/11/2023 Present     Polychromia 04/11/2023 1+       Physical Exam:    Constitutional: Well-developed, well-nourished, and in no distress.  Well-appearing.  Improved.  Thin.  HENT: Normocephalic and atraumatic.  Alopecia.  No nasal congestion or rhinorrhea. Oropharynx is clear and moist. No oral ulcerations or sores.    Eyes: Conjunctivae are normal. Pupils are equal, round.  EOMI.  Nonicteric.  Neck: Normal range of motion of neck, no adenopathy.    Cardiovascular: Normal rate, regular rhythm and normal heart sounds.  No murmur heard. DP/radial pulses 2+, cap refill < 2 sec.  Pulmonary/Chest: Effort normal and breath sounds normal. No respiratory distress. Symmetric expansion.  No crackles or wheezes.  Abdomen: Soft.  Bowel sounds are normal. No distension and no mass. There is no hepatosplenomegaly.    Genitourinary:  Deferred.  Musculoskeletal: Normal range of motion of lower and upper extremities bilaterally. No tenderness to palpation of elbows, wriwts, hands, knees, ankles and feet bilaterally.   Lymphadenopathy: No cervical adenopathy, axillary adenopathy or inguinal adenopathy.   Neurological: Alert and oriented to person and place. Exhibits normal muscle tone bilaterally in upper and lower extremities. Gait normal. Coordination normal.    Skin: Skin is warm, dry and pink.  No rash or evidence of skin infection.  No pallor.   Psychiatric: Mood and affect normal for age.    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 and whose End of Induction IB MRD was both negative by flow cytometry and PCR who presents for Interim Maintenance, Day 21 with Capizzi MTX  (DELAYED 22 DAYS)     1) Ph+ Precursor B-Cell Acute Lymphoblastic Leukemia, in MRD Remission:  - 2-3 weeks of symptoms  - Presenting WBC > 440 k/uL, hyperleukocytosis  - Start of Hydroxyurea (1500 mg PO Q8) 2320 on 5/27/2022  - discontinued after only 55 hours  - No steroid pretreatment  - 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  - Testicular status NEGATIVE       - Flow cytometry of both 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, FISH positive for BCR-ABL1 translocation  - Enrolled on [facility] [place] [id number]  - Initially enrolled on [facility] [place] [id number] - but 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                            - Enrolled on [facility] [place] [id number] and began study 6/13/2022              - Started imatinib therapy 6/3/2022   - End of Induction IA and IB MRD negative  - Imatinib dose increased to 400 mg PO AM and 250 mg PM 9/29/2022  -* Note:  All imatinib doses given inpatient rounded down to 600 mg  - Imatinib held for  Septic Shock 12/27/2022-1/8/2023  - Imatinib 600 mg PO daily restarted 1/8/2023 inpatient  - Imatinib 400 mg PO QAM and 250 mg PO QHS 1/14/2023-1/17/2023  - Imatinib 600 mg PO QAM 1/17/2023 - 3/30/2023  - Imatinib on HOLD until recovery of counts                 - WBC 1.9, Hgb 8.3, platelets 77  - ,                               - ANC today 910, platelets 77.  Counts are permissible to proceed with Day 21 therapy now 22 days delayed for myelosuppression.  Given prior delays to include omission of IV methotrexate at Day 11, will give only 80% of previously achieved Day 1 dose of methotrexate (= 80 mg/m²)      [place] Arm B, Interim Maintenance, Day 21 (DELAYED 22 DAYS):      ** Re-start imatinib 600 mg PO daily      ** Methotrexate 138.5 mg IV x 1 dose (=80 mg/m² which reflects a 20% dose reduction from prior dose on Day 1)  ** Vincristine 2 mg IV x1  ** PEG-Aspariginase 4325 units IV x 1 dose scheduled for tomorrow        - Return to clinic tomorrow for Day 22 PEG asparaginase, return to clinic 10 days for Day 31     2) Soft Tissue Infection / Joint Infection with Bacteroides fragilis:  - Edema and pain continue to improve  - Was initially on cefepime for F&N, added IV Vancomycin on 2/10/2023, discontinued both cefepime and vancomycin on 2/20/2023 after start of IV Flagyl (below)  - S/P open exploration/drainage 2/17/2023  - Culture: B.fragilis (both deep and superficial cultures)   - ID Consultation with Dr. Singh - recommendation for switch to metronidazole for 4+ weeks  - Flagyl DISCONTINUED 3/27/2023 after 5 weeks of therapy   - Neuropathies stable     3) Left Shoulder Pain (GREATLY IMPROVED):  - Improved range of motion and function improved range of motion and function  - Not requiring pain medication     4) Chemotherapy Related Pancytopenia:  - WBC 1.9, Hgb 8.3, platelets 77  - ,   - Transfuse for hemoglobin less than 7.5 or symptomatic  - Transfuse for platelets less than  10,000 or symptomatic  -N o transfusions necessary today     5) At Risk for DVT Secondary to PEG Asparaginase:  - Will restart apixiban 7 days after Day 22 therapy              6) Tachycardia (STABLE):   - Previous cardiac work-up to include echocardiogram as well as telemetry  - Continues to have positional tachycardia and occasional palpitations likely due to combination of factors to include deconditioning and anemia     7) History of Peptic Ulcer Disease/Bleeding:  - No longer taking Pepcid     8) Poor Appetite / Nausea (IMPROVED):  - Reports occasional nausea, improved appetite            9) At Risk of Opportunistic Lung Infection:  - Bactrim DS PO BID Sat and Sun for PJP prophylaxis     10) Central Access:   - R Port-A-Cath in place      Research Participant:           [facility] - Source Data         Diagnosis: Ph+ Precursor B-Cell Acute Lymphoblastic Leukemia     Disease Status: EOI1A MRD NEGATIVE, EOI1B RD NEGATIVE, CNS3c, testicular negative, HSV1 IgG POSITIVE, CMV IgG NEGATIVE, VARICELLA IgG POSITIVE     Active Studies: [id number], [id number]                                                                                                      Inactive Studies: [id number]                                                                                                                                                Optional Studies: None             Protocol: International Phase 3 trial in [place] 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      Treatment Plan: [id number](OS), AR-Arm B, Interim Maintenance, Day 21 (Delayed 122 days)     Height: 1.650 m      Weight: 64.9kg       BSA: 1.73 m²   (Interim Maintenance, Day 1 2/27/2023)                                                                                                                                           Performance Status: Karnofsky 70,  ECOG  3 (3/9/2023)     Treatment Plan Medications:       Restart imatinib 600 mg PO QAM     Evaluations / Study Labs:  (4/11/2023)      WBC 1.9, Hgb 8.3, platelets 77  ,      Therapy Given: (4/11/2023)     Methotrexate 138.5 mg IV x 1 dose (=80 mg/m² which reflects a 20% dose reduction from prior dose on Day 1)  Vincristine 2 mg IV x1         Disposition: Return to clinic in 4/12/2023 for delayed Day 22 of Interim Maintenance. Return in 10 days for Day 31 of Interim Maintenance.      Pepe Faye MD  Pediatric Hematology / Oncology  [facility]  Cell.  [phone]  [facility]. [phone]

## 2023-04-12 NOTE — PROGRESS NOTES
Pt to [facility] for ldoctors office visit, and chemotherapy administration.      Afebrile.  VSS.  Awake and alert in no acute distress.      Port accessed using a 22g 3/4 inch trevino needle with 1 attempt.  Labs drawn on 4/11/23.   Pt tolerated well.      Chemotherapy dosage calculated independently by Tammie Covington RN and Dayna Damian RN and compared to road map for protocol [id number].  Calculations within 10% of written order.  Lab results reviewed.      Premedications and chemo given as ordered, see MAR.  Blood return verified prior to, during, and after chemotherapy infusion.  See Chemotherapy flowsheet.  PT tolerated well.  Pt monitored for 1hr post chemo infusion. No side effects or complications noted.  Port flushed per orders (see MAR) and de-accessed after completion. PT home with mother.  Will return for next visit on 4/21/23.

## 2023-04-12 NOTE — PROGRESS NOTES
"Pharmacy Chemotherapy Calculations Note:    Dx: B-ALL  Cycle:  Interim Maintenance I Day 22   Previous treatment: IM D21 on 4/11/23     Protocol: Interim Maintenance per Arm B of [id number] [facility] ID number: [id number]     4/11/23: Mtx dose reduction today, 80 mg/m2, per Dr Faye       Ht 1.652 m (5' 5.04\")   Wt 57.1 kg (125 lb 14.1 oz)   BMI 20.92 kg/m²  Body surface area is 1.62 meters squared.    No hold parameters for this treatment day       Pegaspargase (Oncaspar) 2500 units/m² x 1.62 m² = 4050 units   <10% difference, okay to treat with final dose = 4325.25 units IV          Judy Bryant, PharmD, BCOP            "

## 2023-04-12 NOTE — PROGRESS NOTES
"Pharmacy Chemotherapy Verification    Patient Name: Tomas Jean-Baptiste   Dx: pH+ B-Cell ALL         Protocol: Capizzi MTX IM (On Study Arm B, ID number: [id number])         Allergies:  Amoxicillin       Ht 1.652 m (5' 5.04\")   Wt 57.1 kg (125 lb 14.1 oz)   BMI 20.92 kg/m²      4/11/23: Body surface area is 1.62 meters squared.    Weight Type Height Weight BSA Additional Details   Treatment plan 165.1 cm 64.9 kg 1.73 m² Documented as of 2/27/2023         Drug Order   (Drug name, dose, route, IV Fluid & volume, frequency, number of doses) Cycle: IM D22   Previous treatment: IM D21 on 4/11/23   Medication = methotrexate  Base Dose= 12mg fixed dose  Calc Dose: n/a  Final Dose = 12mg  Route = INTRATHECAL  Fluid & Volume = PF NS 6mL  Admin Duration = IT per MD   Day 1 and 31       No calculation required  ok to treat with final dose   Medication = Vincristine (ONCOVIN)   Base Dose= 1.5 mg/m2  Calc Dose: Base Dose x 1.62 m2 = 2.415  Final Dose = 2 mg (MAX)   Route = IV  Fluid & Volume = NS 25 mL  Admin Duration = Over 5 - 10 minutes    Days 1,11,21,31,and 41       Treat with MAX dose    Medication = Methotrexate PF  Base Dose = 80 mg/m2 (Dose Reduction per Dr Faye)   Calc Dose:Base Dose x 1.62 m2 = 129.6 mg  Final Dose = 138.5 mg   Route = IV  Fluid & Volume = NS 50 mL  Admin Duration = Over 15 minutes    Days 1,11,21,31,and 41        < 10% difference, treat with final dose    Medication = Pegaspargase (ONCASPAR)  Base Dose= 2,500 units/m2  Calc Dose: Base Dose x 1.62 m2 = 4,050 units  Final Dose = 4,323.25 units  Route = IV  Fluid & Volume =  mL  Admin Duration = Over 2 hours   Days 2 and 22       <10% difference, treat with final dose   By my signature below, I confirm this process was performed independently with the BSA and all final chemotherapy dosing calculations congruent. I have reviewed the above chemotherapy order and that my calculation of the final dose and BSA (when applicable) corroborate " those calculations of the  pharmacist. Discrepancies of 10% or greater in the written dose have been addressed and documented within the EPIC Progress notes.    Xochilt DriverD

## 2023-04-13 ENCOUNTER — TELEPHONE (OUTPATIENT)
Dept: INFUSION CENTER | Facility: MEDICAL CENTER | Age: 23
End: 2023-04-13
Payer: MEDICAID

## 2023-04-13 NOTE — TELEPHONE ENCOUNTER
Discharge phone call to pt re: visit on 4/12/23.Pt reports feeling tired with some nausea but otherwise feels ok.  No questions or concerns at this time.

## 2023-04-14 ENCOUNTER — HOSPITAL ENCOUNTER (INPATIENT)
Facility: MEDICAL CENTER | Age: 23
LOS: 3 days | DRG: 871 | End: 2023-04-17
Attending: PEDIATRICS | Admitting: PEDIATRICS
Payer: COMMERCIAL

## 2023-04-14 DIAGNOSIS — T45.1X5A CHEMOTHERAPY INDUCED NAUSEA AND VOMITING: ICD-10-CM

## 2023-04-14 DIAGNOSIS — C91.Z0 B LYMPHOBLASTIC LEUKEMIA WITH T(9;22)(Q34;Q11.2);BCR-ABL1 (HCC): ICD-10-CM

## 2023-04-14 DIAGNOSIS — R11.2 CHEMOTHERAPY INDUCED NAUSEA AND VOMITING: ICD-10-CM

## 2023-04-14 DIAGNOSIS — Z95.828 PORT-A-CATH IN PLACE: ICD-10-CM

## 2023-04-14 DIAGNOSIS — R11.2 NAUSEA AND VOMITING, UNSPECIFIED VOMITING TYPE: ICD-10-CM

## 2023-04-14 DIAGNOSIS — R65.10 SIRS (SYSTEMIC INFLAMMATORY RESPONSE SYNDROME) (HCC): ICD-10-CM

## 2023-04-14 LAB
ALBUMIN SERPL BCP-MCNC: 4.1 G/DL (ref 3.2–4.9)
ALBUMIN/GLOB SERPL: 1.3 G/DL
ALP SERPL-CCNC: 119 U/L (ref 30–99)
ALT SERPL-CCNC: 32 U/L (ref 2–50)
ANION GAP SERPL CALC-SCNC: 15 MMOL/L (ref 7–16)
AST SERPL-CCNC: 40 U/L (ref 12–45)
BASOPHILS # BLD AUTO: 0.9 % (ref 0–1.8)
BASOPHILS # BLD: 0.02 K/UL (ref 0–0.12)
BILIRUB SERPL-MCNC: 0.8 MG/DL (ref 0.1–1.5)
BUN SERPL-MCNC: 20 MG/DL (ref 8–22)
CALCIUM ALBUM COR SERPL-MCNC: 9.2 MG/DL (ref 8.5–10.5)
CALCIUM SERPL-MCNC: 9.3 MG/DL (ref 8.5–10.5)
CHLORIDE SERPL-SCNC: 100 MMOL/L (ref 96–112)
CO2 SERPL-SCNC: 22 MMOL/L (ref 20–33)
CREAT SERPL-MCNC: 0.72 MG/DL (ref 0.5–1.4)
EOSINOPHIL # BLD AUTO: 0 K/UL (ref 0–0.51)
EOSINOPHIL NFR BLD: 0 % (ref 0–6.9)
ERYTHROCYTE [DISTWIDTH] IN BLOOD BY AUTOMATED COUNT: 79 FL (ref 35.9–50)
GFR SERPLBLD CREATININE-BSD FMLA CKD-EPI: 133 ML/MIN/1.73 M 2
GLOBULIN SER CALC-MCNC: 3.1 G/DL (ref 1.9–3.5)
GLUCOSE SERPL-MCNC: 105 MG/DL (ref 65–99)
HCT VFR BLD AUTO: 28.9 % (ref 42–52)
HGB BLD-MCNC: 9.3 G/DL (ref 14–18)
LYMPHOCYTES # BLD AUTO: 0.32 K/UL (ref 1–4.8)
LYMPHOCYTES NFR BLD: 14.5 % (ref 22–41)
MANUAL DIFF BLD: ABNORMAL
MCH RBC QN AUTO: 31.2 PG (ref 27–33)
MCHC RBC AUTO-ENTMCNC: 32.2 G/DL (ref 33.7–35.3)
MCV RBC AUTO: 97 FL (ref 81.4–97.8)
MONOCYTES # BLD AUTO: 0 K/UL (ref 0–0.85)
MONOCYTES NFR BLD AUTO: 0 % (ref 0–13.4)
NEUTROPHILS # BLD AUTO: 1.86 K/UL (ref 1.82–7.42)
NEUTROPHILS NFR BLD: 84.6 % (ref 44–72)
PLATELET # BLD AUTO: 75 K/UL (ref 164–446)
PMV BLD AUTO: 11.5 FL (ref 9–12.9)
POTASSIUM SERPL-SCNC: 3.9 MMOL/L (ref 3.6–5.5)
PROT SERPL-MCNC: 7.2 G/DL (ref 6–8.2)
RBC # BLD AUTO: 2.98 M/UL (ref 4.7–6.1)
SODIUM SERPL-SCNC: 137 MMOL/L (ref 135–145)
WBC # BLD AUTO: 2.2 K/UL (ref 4.8–10.8)

## 2023-04-14 PROCEDURE — 85027 COMPLETE CBC AUTOMATED: CPT

## 2023-04-14 PROCEDURE — 700111 HCHG RX REV CODE 636 W/ 250 OVERRIDE (IP): Performed by: PEDIATRICS

## 2023-04-14 PROCEDURE — 700101 HCHG RX REV CODE 250: Performed by: PEDIATRICS

## 2023-04-14 PROCEDURE — 36415 COLL VENOUS BLD VENIPUNCTURE: CPT

## 2023-04-14 PROCEDURE — 99223 1ST HOSP IP/OBS HIGH 75: CPT | Performed by: PEDIATRICS

## 2023-04-14 PROCEDURE — 700105 HCHG RX REV CODE 258: Performed by: PEDIATRICS

## 2023-04-14 PROCEDURE — 770004 HCHG ROOM/CARE - ONCOLOGY PRIVATE *

## 2023-04-14 PROCEDURE — 85007 BL SMEAR W/DIFF WBC COUNT: CPT

## 2023-04-14 PROCEDURE — 80053 COMPREHEN METABOLIC PANEL: CPT

## 2023-04-14 PROCEDURE — 87040 BLOOD CULTURE FOR BACTERIA: CPT

## 2023-04-14 RX ORDER — LIDOCAINE AND PRILOCAINE 25; 25 MG/G; MG/G
CREAM TOPICAL PRN
Status: DISCONTINUED | OUTPATIENT
Start: 2023-04-14 | End: 2023-04-17 | Stop reason: HOSPADM

## 2023-04-14 RX ORDER — SULFAMETHOXAZOLE AND TRIMETHOPRIM 800; 160 MG/1; MG/1
2 TABLET ORAL
Status: COMPLETED | OUTPATIENT
Start: 2023-04-15 | End: 2023-04-16

## 2023-04-14 RX ORDER — DEXTROSE MONOHYDRATE, SODIUM CHLORIDE, AND POTASSIUM CHLORIDE 50; 1.49; 4.5 G/1000ML; G/1000ML; G/1000ML
INJECTION, SOLUTION INTRAVENOUS CONTINUOUS
Status: DISCONTINUED | OUTPATIENT
Start: 2023-04-14 | End: 2023-04-17 | Stop reason: HOSPADM

## 2023-04-14 RX ORDER — FAMOTIDINE 20 MG/1
20 TABLET, FILM COATED ORAL 2 TIMES DAILY
Status: DISCONTINUED | OUTPATIENT
Start: 2023-04-14 | End: 2023-04-14

## 2023-04-14 RX ORDER — FAMOTIDINE 20 MG/1
20 TABLET, FILM COATED ORAL 2 TIMES DAILY
Status: DISCONTINUED | OUTPATIENT
Start: 2023-04-15 | End: 2023-04-17 | Stop reason: HOSPADM

## 2023-04-14 RX ORDER — SODIUM CHLORIDE 9 MG/ML
INJECTION, SOLUTION INTRAVENOUS ONCE
Status: DISCONTINUED | OUTPATIENT
Start: 2023-04-14 | End: 2023-04-14

## 2023-04-14 RX ORDER — ONDANSETRON 2 MG/ML
8 INJECTION INTRAMUSCULAR; INTRAVENOUS EVERY 8 HOURS
Status: DISCONTINUED | OUTPATIENT
Start: 2023-04-14 | End: 2023-04-17 | Stop reason: HOSPADM

## 2023-04-14 RX ORDER — SULFAMETHOXAZOLE AND TRIMETHOPRIM 800; 160 MG/1; MG/1
2 TABLET ORAL
Status: DISCONTINUED | OUTPATIENT
Start: 2023-04-15 | End: 2023-04-14

## 2023-04-14 RX ORDER — SODIUM CHLORIDE 9 MG/ML
1000 INJECTION, SOLUTION INTRAVENOUS ONCE
Status: COMPLETED | OUTPATIENT
Start: 2023-04-14 | End: 2023-04-14

## 2023-04-14 RX ORDER — LORAZEPAM 2 MG/ML
0.5 INJECTION INTRAMUSCULAR EVERY 6 HOURS PRN
Status: DISCONTINUED | OUTPATIENT
Start: 2023-04-14 | End: 2023-04-17 | Stop reason: HOSPADM

## 2023-04-14 RX ADMIN — ONDANSETRON 8 MG: 2 INJECTION INTRAMUSCULAR; INTRAVENOUS at 22:49

## 2023-04-14 RX ADMIN — SODIUM CHLORIDE 1000 ML: 9 INJECTION, SOLUTION INTRAVENOUS at 16:24

## 2023-04-14 RX ADMIN — CEFEPIME 2 G: 2 INJECTION, POWDER, FOR SOLUTION INTRAVENOUS at 22:47

## 2023-04-14 RX ADMIN — ONDANSETRON 8 MG: 2 INJECTION INTRAMUSCULAR; INTRAVENOUS at 17:17

## 2023-04-14 RX ADMIN — CEFEPIME 2 G: 2 INJECTION, POWDER, FOR SOLUTION INTRAVENOUS at 16:27

## 2023-04-14 RX ADMIN — DEXTROSE MONOHYDRATE, SODIUM CHLORIDE, AND POTASSIUM CHLORIDE: 50; 4.5; 1.49 INJECTION, SOLUTION INTRAVENOUS at 18:01

## 2023-04-14 ASSESSMENT — COGNITIVE AND FUNCTIONAL STATUS - GENERAL
SUGGESTED CMS G CODE MODIFIER MOBILITY: CH
MOBILITY SCORE: 24
DAILY ACTIVITIY SCORE: 24
SUGGESTED CMS G CODE MODIFIER DAILY ACTIVITY: CH

## 2023-04-14 ASSESSMENT — LIFESTYLE VARIABLES
DOES PATIENT WANT TO STOP DRINKING: NO
TOTAL SCORE: 0
HAVE YOU EVER FELT YOU SHOULD CUT DOWN ON YOUR DRINKING: NO
EVER FELT BAD OR GUILTY ABOUT YOUR DRINKING: NO
ON A TYPICAL DAY WHEN YOU DRINK ALCOHOL HOW MANY DRINKS DO YOU HAVE: 0
TOTAL SCORE: 0
CONSUMPTION TOTAL: NEGATIVE
AVERAGE NUMBER OF DAYS PER WEEK YOU HAVE A DRINK CONTAINING ALCOHOL: 0
EVER HAD A DRINK FIRST THING IN THE MORNING TO STEADY YOUR NERVES TO GET RID OF A HANGOVER: NO
TOTAL SCORE: 0
ALCOHOL_USE: NO
HOW MANY TIMES IN THE PAST YEAR HAVE YOU HAD 5 OR MORE DRINKS IN A DAY: 0
HAVE PEOPLE ANNOYED YOU BY CRITICIZING YOUR DRINKING: NO

## 2023-04-14 ASSESSMENT — FIBROSIS 4 INDEX: FIB4 SCORE: 1.636363636363636364

## 2023-04-14 ASSESSMENT — PAIN DESCRIPTION - PAIN TYPE: TYPE: ACUTE PAIN

## 2023-04-14 NOTE — H&P
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 definitive care  Presenting (local) WBC > 440K, Hgb 10.0, platelets 53, (automated differential ANC 3190, ALC 75,310, absolute monocyte count 97945, absolute blast count 340,560)  Uric Acid 15.6, phosphorus 5.6, LDH 1114  Rasburicase x 1 dose given   Peripheral Blood flow cytometry demonstrating CD10 pos, CD19 pos, CD20 neg, CD22 dim (60%) 5/28/2022  Peripheral blood FISH for BCR-ABL1 positive in 98% of analyzed cells     Age at Diagnosis: 21 years  White Blood Cell Count at Presentation: > 440 k/uL  Testicular Disease Status: Negative (see procedure note 5/30/2022)  CNS Status: 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, diagnostic LP with WBC 1, RBC 3 and no evidence of leukemic blasts 5/30/2022  Steroid Pre-treatment: None  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     All inclusion/exclusion criteria for [id number] met and consent signed - enrolled 5/29/2022   All inclusion/exclusion  criteria for [place] [id number] met and consent signed - enrolled 5/30/2022  Confirmatory bone marrow aspirate and biopsy and diagnostic LP + cytarabine 70 mg IT 5/30/2022  Induction therapy (ON STUDY [id number]) started 5/30/2022  Bone marrow immunohistochemistry consistent with diagnosis of B-ALL comprising 90% of marrow cellularity  Bone marrow sample [facility] [place] [facility] [place] [facility] [place]:  Flow cytom  Of the blood pressure little high that is a problem is a cultural problem is well and cultural genetic and everything else like that unfortunately breathalyzers such bad heart disease diabetes things like that  populations etry consistent with peripheral blood, cytogenetics remarkable for known t(9;22)  CSF with WBC 1, RBC 3, no blasts identified on cytospin  FISH results available 5/31/2022 making patient eligible for transfer from [place] [address] to [place] [address] as eligibility requirements were met for [place] [address]  Patient unenrolled from [place] [address] (BCR-ABL1 fusion positive) 6/1/2022  Consent signed for [place] [address] and patient enrolled 6/1/2022 (see eligibility checklist from 5/31/2022 and consent note from 6/1/2022)  Imatinib 400 mg PO QAM / 200 mg PO QPM started 6/3/2022 (allowed per [place] [address])  Patient completed the first 15 days of a Standard 4-drug Induction on 6/13/2022  Start of [place] [address](OS), Induction IA Part 2, Day 15 6/13/2022  End of Induction 1A Part 2 - MRD negative  Start of [place] [address](OS), Induction IA Part 2, Day 15 7/5/2022  Induction IB DELAYED 2 weeks 14 days from 7/26/2022-8/9/2022) for myelosuppression - Start of Day 22 cytarabine block 8/9/2022  Induction IB Day 42 delayed 9 days for myelosuppression - Day 42 evaluations 9/7/2022  End of Induction IB - Flow cytometric MRD negative, MRD by IgH-TCR PCR [id number]%  Randomization to AR-Experimental Arm B of [place] [address]  Start of AR-Experimental Arm B, Interim Maintenance 9/29/2022  Weight based increase in dose of imatinib to 400 mg PO AM and 250 mg PM  9/29/2022  Thrombocytopenia not permissive of proceeding with Day 15 of Interim Maintenance  AR-Experimental Arm B, Interim Maintenance, Day 15 delayed 4 days, start 10/17/2022  AR-Experimental Arm B, Interim Maintenance, Day 29, start 11/1/2022  AR-Experimental Arm B, Interim Maintenance, Day 43, start 11/14/2022  Last does of 6-MP 11/27/2022  AR-Experimental Arm B, Delayed Intensification, Day 1, start 12/6/2022  Admission with Severe Septic Shock 12/27/2022  Imatinib HELD 12/27/2022-1/8/2023  AR-Experimental Arm B, Delayed Intensification, Day 29 DELAYED 14 days with start 1/17/2023  Hospitalization 2/7/2023 on Day 50 of Delayed Intensification with left shoulder pain ultimately diagnosed with Bacteroides fragilis infection  AR-Experimental Arm B, Interim Maintenance, Day 1 DELAYED 7 days with start 2/27/2023  AR-Experimental Arm B, Interim Maintenance, Day 11 DELAYED 4 days for platelets 43K on 3/9/2023  AR-Experimental Arm B, Interim Maintenance, Day 11 VCR given and MTX omitted for platelets 43K 3/13/2023  AR-Experimental Arm B, Interim Maintenance, Day 21 (DELAYED)     Past Medical History:    1) Previously Healthy  2) 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  3) Hyperleukocytosis  4) Hyperuricemia  5) Hyperphosphatemia  6) Right Lower Extremity Superficial Thrombus  7) Subsegmental Pulmonary Embolism  8) 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   Two dogs.  Everyone is well in the house. Father not involved.     Family History:     Family History             Family History   Problem Relation Age of Onset    No Known Problems Mother      Diabetes Paternal Grandfather      Hypertension Paternal Grandfather      Hyperlipidemia Paternal Grandfather      Cancer Neg Hx      Heart Disease Neg Hx      Stroke Neg Hx           No significant family history of cancer.  Both maternal and paternal family history of diabetes.     Immunizations:  UTD    Medications:   No current facility-administered medications on file prior to encounter.     Current Outpatient Medications on File Prior to Encounter   Medication Sig Dispense Refill    imatinib (GLEEVEC) 400 MG tablet Take 1 Tablet by mouth every day. Pt takes 200 mg + 400 mg for a total dose of 600 mg daily 30 Tablet 5    imatinib (GLEEVEC) 100 MG tablet Take 2 Tablets by mouth every day. Pt takes 200 mg + 400 mg for a total dose of 600 mg daily 60 Tablet 5    Omega-3 Fatty Acids (FISH OIL PO) Take  by mouth.      famotidine (PEPCID) 20 MG Tab Take 1 Tablet by mouth 2 times a day. (Patient not taking: Reported on 3/30/2023) 60 Tablet 4    ondansetron (ZOFRAN ODT) 8 MG TABLET DISPERSIBLE Dissolve 1 Tablet by mouth every 6 hours as needed for Nausea/Vomiting. 10 Tablet 0    senna-docusate (PERICOLACE OR SENOKOT S) 8.6-50 MG Tab Take 1 Tablet by mouth every day. 30 Tablet 0    sulfamethoxazole-trimethoprim (BACTRIM DS) 800-160 MG tablet Take 2 Tablets by mouth in the morning every Sat and Sun.      vitamin D3 (CHOLECALCIFEROL) 1000 Unit (25 mcg) Tab Take 1 Tablet by mouth every day.      therapeutic multivitamin-minerals (THERAGRAN-M) Tab Take 1 Tablet by mouth every day.           OBJECTIVE:     Vitals:   /76   Pulse 100   Temp 36.7 °C (98.1 °F) (Oral)   Resp 18   SpO2 100%     Labs:    CBC, CMP, blood cultures PENDING    Physical Exam:    Constitutional: Thin and wasted.  Appears sick,  not yet toxic.  Does however exhibit intermittent chills concerning for SIRS/sepsis.  HENT: Normocephalic and atraumatic.  Alopecia.  No nasal congestion or rhinorrhea. Oropharynx is clear and moist. No oral ulcerations or sores.    Eyes: Conjunctivae are normal. Pupils are equal, round.  EOMI.  Nonicteric.  Neck: Normal range of motion of neck, no adenopathy.    Cardiovascular: Normal rate, regular rhythm and normal heart sounds.  No murmur heard. DP/radial pulses 2+, cap refill is approximately 3 to 4 seconds.  Pulmonary/Chest: Effort normal and breath sounds normal. No respiratory distress. Symmetric expansion.  No crackles or wheezes.  Abdomen: Soft. Bowel sounds are normal. No distension and no mass. There is no hepatosplenomegaly.    Genitourinary:  Deferred.  Musculoskeletal: Normal range of motion of lower and upper extremities bilaterally. No tenderness to palpation of elbows, wrists, hands, knees, ankles and feet bilaterally.  Left shoulder with full range of motion.  No tenderness to palpation of surgical scar, no fluctuance or pockets of fluid, no cellulitis or overlying skin changes.  Strength is decreased but decreased on both sides of the body.  Neurological: Alert and oriented to person and place. Exhibits normal muscle tone bilaterally in upper and lower extremities.  Strength diminished throughout.  Intention tremor noted.    Skin: Skin is warm, dry and pink.  No rash or evidence of skin infection.  No pallor.  Well-healed surgical scar left shoulder, well-healed Port-A-Cath c/d/i.  Psychiatric: Mood and affect normal for age.      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 and whose End of Induction IB MRD was both negative by flow cytometry and PCR who presents for Interim Maintenance, Day 24 with Capizzi methotrexate who presents with acute clinical worsening concerning for  possible sepsis     1) Clinical Sepsis:   - Recent chemotherapy only 3 days ago on 4/11/2023 with Capizzi methotrexate - at the time, , abs monos 260   - Received Oncospar 4/12/2023   - Since receiving chemotherapy, clinical worsening with increased fatigue, shortness of breath, persistent nausea with vomiting and now chills   - Tachycardic on presentation, compensated with normal blood pressure at 125/76, slightly prolonged capillary refill   - No known source of infection at this time   - History of Port-A-Cath infection and severe septic shock with Klebsiella and E. coli in December 2022   - History of left shoulder infection with B. fragilis treated with 5 weeks of metronidazole   - Afebrile on presentation today but with chills   - ANC PENDING   - Given low threshold for septic shock and clinical sepsis picture, will treat as such   - CBC and blood cultures (peripheral and central) to be drawn at admission   - NS bolus x 1 prior to initiation of antibiotics   - Cefepime 2 g IV Q8 hours   - Monitor blood pressure, heart rate closely while administering antibiotics     - Consider adding metronidazole to antimicrobials given prior history of B. fragilis   - Consider holding imatinib if counts have fallen again    2) Ph+ Precursor B-Cell Acute Lymphoblastic Leukemia, in MRD Remission:  - 2-3 weeks of symptoms  - Presenting WBC > 440 k/uL, hyperleukocytosis  - Start of Hydroxyurea (1500 mg PO Q8) 2320 on 5/27/2022  - discontinued after only 55 hours  - No steroid pretreatment  - 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  - Testicular status NEGATIVE       - Flow cytometry of both 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, FISH positive for BCR-ABL1 translocation  - Enrolled on [facility] [id number]  - Initially enrolled on [facility] [id number] - but 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                            - Enrolled on [facility] [id number] and began study 6/13/2022               - Started imatinib therapy 6/3/2022   - End of Induction IA and IB MRD negative  - Imatinib dose increased to 400 mg PO AM and 250 mg PM 9/29/2022  -* Note:  All imatinib doses given inpatient rounded down to 600 mg  - Imatinib held for Septic Shock 12/27/2022-1/8/2023  - Imatinib 600 mg PO daily restarted 1/8/2023 inpatient  - Imatinib 400 mg PO QAM and 250 mg PO QHS 1/14/2023-1/17/2023  - Imatinib 600 mg PO QAM 1/17/2023 - 3/30/2023                 - LABS PENDING                [id number], AR Arm B, Interim Maintenance, Day 24:      ** Continue imatinib 600 mg PO daily        3) Vomiting:  -Possibly due to Oncaspar   - Alternate etiologies include clinical sepsis    - Provide antiemetic support   - Fluids at maintenance following bolus    4) History of Soft Tissue Infection / Joint Infection with Bacteroides fragilis:  - Edema and pain continue to improve  - Was initially on cefepime for F&N, added IV Vancomycin on 2/10/2023, discontinued both cefepime and vancomycin on 2/20/2023 after start of IV Flagyl (below)  - S/P open exploration/drainage 2/17/2023  - Culture: B.fragilis (both deep and superficial cultures)   - ID Consultation with Dr. Singh - recommendation for switch to metronidazole for 4+ weeks  - Completed 37 days of metronidazole  - Therapy discontinued 3/27/2023  - Clinical sepsis as above possibly reemergence of B.fragilis  -Strongly consider adding metronidazole     5) Left Shoulder Pain (RESOLVED):  - Left shoulder pain had completely resolved, now with some mild discomfort of scar but otherwise no signs of active infection at this time     6) Chemotherapy Related Pancytopenia:  - CBC PENDING  - Transfuse for hemoglobin less than 7.5 or symptomatic  - Transfuse for platelets less than 10,000 or symptomatic     7) At Risk for DVT Secondary to PEG Asparaginase:   - Received PEG asparaginase 4/12/2023  - Resume apixaban 2.5 mg PO BID next week, before 4/19/2023         8) Tachycardia (STABLE):   -  Previous cardiac work-up to include echocardiogram as well as telemetry  - Has had intermittent tachycardia since severe septic shock in December 2022  -Now with worsening tachycardia palpitations     9) History of Peptic Ulcer Disease/Bleeding:  - No longer taking Pepcid     10) At Risk of Opportunistic Lung Infection:  - Bactrim DS PO BID Sat and Sun for PJP prophylaxis     11) Central Access:   - R Port-A-Cath in place      Research Participant:           [facility] - Source Data         Diagnosis: Ph+ Precursor B-Cell Acute Lymphoblastic Leukemia     Disease Status: EOI1A MRD NEGATIVE, EOI1B RD NEGATIVE, CNS3c, testicular negative, HSV1 IgG POSITIVE, CMV IgG NEGATIVE, VARICELLA IgG POSITIVE     Active Studies: [id number], [id number]                                                                                                      Inactive Studies: [id number]                                                                                                                                                Optional Studies: None             Protocol: International Phase 3 trial in Pepin 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      Treatment Plan: [id number](OS), AR-Arm B, Interim Maintenance, Day 24     Height: 1.650 m      Weight: 64.9kg       BSA: 1.73 m²   (Interim Maintenance, Day 1 2/27/2023)                                                                                                                                           Performance Status: Karnofsky 60, ECOG  3 (4/14/2023)     Treatment Plan Medications:  (100% adherent with imatinib)     Imatinib 600 mg QAM     Evaluations / Study Labs:      None     Therapy Given:     None    Toxicities:    Admission for clinical sepsis 4/14/2023         Disposition: Admit to CNU for clinical sepsis      Pepe Faye MD  Pediatric Hematology /  Oncology  [facility]  Cell.  [phone]  [facility]. [phone]

## 2023-04-15 LAB
ALBUMIN SERPL BCP-MCNC: 3.2 G/DL (ref 3.2–4.9)
ALBUMIN/GLOB SERPL: 1.4 G/DL
ALP SERPL-CCNC: 88 U/L (ref 30–99)
ALT SERPL-CCNC: 25 U/L (ref 2–50)
ANION GAP SERPL CALC-SCNC: 8 MMOL/L (ref 7–16)
AST SERPL-CCNC: 26 U/L (ref 12–45)
BASOPHILS # BLD AUTO: 1 % (ref 0–1.8)
BASOPHILS # BLD: 0.01 K/UL (ref 0–0.12)
BILIRUB SERPL-MCNC: 0.6 MG/DL (ref 0.1–1.5)
BUN SERPL-MCNC: 14 MG/DL (ref 8–22)
CALCIUM ALBUM COR SERPL-MCNC: 8.5 MG/DL (ref 8.5–10.5)
CALCIUM SERPL-MCNC: 7.9 MG/DL (ref 8.5–10.5)
CHLORIDE SERPL-SCNC: 106 MMOL/L (ref 96–112)
CO2 SERPL-SCNC: 23 MMOL/L (ref 20–33)
CREAT SERPL-MCNC: 0.63 MG/DL (ref 0.5–1.4)
EOSINOPHIL # BLD AUTO: 0.01 K/UL (ref 0–0.51)
EOSINOPHIL NFR BLD: 1 % (ref 0–6.9)
ERYTHROCYTE [DISTWIDTH] IN BLOOD BY AUTOMATED COUNT: 76.8 FL (ref 35.9–50)
GFR SERPLBLD CREATININE-BSD FMLA CKD-EPI: 138 ML/MIN/1.73 M 2
GLOBULIN SER CALC-MCNC: 2.3 G/DL (ref 1.9–3.5)
GLUCOSE SERPL-MCNC: 94 MG/DL (ref 65–99)
HCT VFR BLD AUTO: 23.5 % (ref 42–52)
HGB BLD-MCNC: 7.8 G/DL (ref 14–18)
LYMPHOCYTES # BLD AUTO: 0.4 K/UL (ref 1–4.8)
LYMPHOCYTES NFR BLD: 31 % (ref 22–41)
MANUAL DIFF BLD: ABNORMAL
MCH RBC QN AUTO: 31.3 PG (ref 27–33)
MCHC RBC AUTO-ENTMCNC: 33.2 G/DL (ref 33.7–35.3)
MCV RBC AUTO: 94.4 FL (ref 81.4–97.8)
MONOCYTES # BLD AUTO: 0.03 K/UL (ref 0–0.85)
MONOCYTES NFR BLD AUTO: 2 % (ref 0–13.4)
NEUTROPHILS # BLD AUTO: 0.85 K/UL (ref 1.82–7.42)
NEUTROPHILS NFR BLD: 65 % (ref 44–72)
PLATELET # BLD AUTO: 49 K/UL (ref 164–446)
PMV BLD AUTO: 10.9 FL (ref 9–12.9)
POTASSIUM SERPL-SCNC: 3.8 MMOL/L (ref 3.6–5.5)
PROT SERPL-MCNC: 5.5 G/DL (ref 6–8.2)
RBC # BLD AUTO: 2.49 M/UL (ref 4.7–6.1)
SODIUM SERPL-SCNC: 137 MMOL/L (ref 135–145)
WBC # BLD AUTO: 1.3 K/UL (ref 4.8–10.8)

## 2023-04-15 PROCEDURE — 700102 HCHG RX REV CODE 250 W/ 637 OVERRIDE(OP): Performed by: PEDIATRICS

## 2023-04-15 PROCEDURE — A9270 NON-COVERED ITEM OR SERVICE: HCPCS | Performed by: PEDIATRICS

## 2023-04-15 PROCEDURE — 770004 HCHG ROOM/CARE - ONCOLOGY PRIVATE *

## 2023-04-15 PROCEDURE — 700101 HCHG RX REV CODE 250: Performed by: PEDIATRICS

## 2023-04-15 PROCEDURE — 85027 COMPLETE CBC AUTOMATED: CPT

## 2023-04-15 PROCEDURE — 85007 BL SMEAR W/DIFF WBC COUNT: CPT

## 2023-04-15 PROCEDURE — 700105 HCHG RX REV CODE 258: Performed by: PEDIATRICS

## 2023-04-15 PROCEDURE — 80053 COMPREHEN METABOLIC PANEL: CPT

## 2023-04-15 PROCEDURE — 700111 HCHG RX REV CODE 636 W/ 250 OVERRIDE (IP): Performed by: PEDIATRICS

## 2023-04-15 PROCEDURE — 99233 SBSQ HOSP IP/OBS HIGH 50: CPT | Performed by: PEDIATRICS

## 2023-04-15 RX ORDER — METRONIDAZOLE 500 MG/1
500 TABLET ORAL 3 TIMES DAILY
Status: ON HOLD | COMMUNITY
End: 2023-04-17

## 2023-04-15 RX ORDER — LORAZEPAM 2 MG/ML
0.25 INJECTION INTRAMUSCULAR ONCE
Status: COMPLETED | OUTPATIENT
Start: 2023-04-15 | End: 2023-04-15

## 2023-04-15 RX ADMIN — ONDANSETRON 8 MG: 2 INJECTION INTRAMUSCULAR; INTRAVENOUS at 14:08

## 2023-04-15 RX ADMIN — CEFEPIME 2 G: 2 INJECTION, POWDER, FOR SOLUTION INTRAVENOUS at 14:15

## 2023-04-15 RX ADMIN — IMATINIB MESYLATE 600 MG: 400 TABLET, FILM COATED ORAL at 09:23

## 2023-04-15 RX ADMIN — FAMOTIDINE 20 MG: 20 TABLET, FILM COATED ORAL at 18:22

## 2023-04-15 RX ADMIN — SODIUM CHLORIDE 150 MG: 9 INJECTION, SOLUTION INTRAVENOUS at 00:26

## 2023-04-15 RX ADMIN — CEFEPIME 2 G: 2 INJECTION, POWDER, FOR SOLUTION INTRAVENOUS at 21:41

## 2023-04-15 RX ADMIN — SULFAMETHOXAZOLE AND TRIMETHOPRIM 2 TABLET: 800; 160 TABLET ORAL at 09:22

## 2023-04-15 RX ADMIN — DEXTROSE MONOHYDRATE, SODIUM CHLORIDE, AND POTASSIUM CHLORIDE: 50; 4.5; 1.49 INJECTION, SOLUTION INTRAVENOUS at 09:35

## 2023-04-15 RX ADMIN — FAMOTIDINE 20 MG: 20 TABLET, FILM COATED ORAL at 05:26

## 2023-04-15 RX ADMIN — CEFEPIME 2 G: 2 INJECTION, POWDER, FOR SOLUTION INTRAVENOUS at 05:31

## 2023-04-15 RX ADMIN — DEXTROSE MONOHYDRATE, SODIUM CHLORIDE, AND POTASSIUM CHLORIDE: 50; 4.5; 1.49 INJECTION, SOLUTION INTRAVENOUS at 18:24

## 2023-04-15 RX ADMIN — ONDANSETRON 8 MG: 2 INJECTION INTRAMUSCULAR; INTRAVENOUS at 21:28

## 2023-04-15 RX ADMIN — LORAZEPAM 0.26 MG: 2 INJECTION INTRAMUSCULAR; INTRAVENOUS at 00:30

## 2023-04-15 NOTE — PROGRESS NOTES
4 Eyes Skin Assessment Completed by MONTSERRAT Hargrove and MONTSERRAT Gibson.    Head Scar above left eyebrow  Ears WDL  Nose WDL  Mouth WDL  Neck WDL  Breast/Chest Scar left armpit  Shoulder Blades WDL  Spine WDL  (R) Arm/Elbow/Hand WDL  (L) Arm/Elbow/Hand WDL  Abdomen WDL  Groin WDL  Scrotum/Coccyx/Buttocks WDL  (R) Leg WDL  (L) Leg WDL  (R) Heel/Foot/Toe WDL  (L) Heel/Foot/Toe WDL          Devices In Places Central Line      Interventions In Place N/A    Possible Skin Injury No    Pictures Uploaded Into Epic N/A  Wound Consult Placed N/A  RN Wound Prevention Protocol Ordered No

## 2023-04-15 NOTE — PROGRESS NOTES
Spoke with  Dr. Duenas at 11:10 pm in regards to patients n/v. Zofran was not working for the patient and patient had requested ativan. Dr. Duenas put in order for ativan 0.5-1 for the patient to help with n/v. Patient had a blood pressure of 85/52, spoke with Dr. Duenas, she asked to have an order put in for 0.25, and to retake blood pressure.

## 2023-04-15 NOTE — CARE PLAN
The patient is Watcher - Medium risk of patient condition declining or worsening    Shift Goals  Clinical Goals: Monitor labs  Patient Goals: Control N/V    Progress made toward(s) clinical / shift goals:        Problem: Knowledge Deficit - Standard  Goal: Patient and family/care givers will demonstrate understanding of plan of care, disease process/condition, diagnostic tests and medications  Description: Target End Date:  1-3 days or as soon as patient condition allows    Document in Patient Education    1.  Patient and family/caregiver oriented to unit, equipment, visitation policy and means for communicating concern  2.  Complete/review Learning Assessment  3.  Assess knowledge level of disease process/condition, treatment plan, diagnostic tests and medications  4.  Explain disease process/condition, treatment plan, diagnostic tests and medications  Outcome: Progressing  Note: Patient understands the need for continued monitoring of labs.      Problem: Fall Risk  Goal: Patient will remain free from falls  Description: Target End Date:  Prior to discharge or change in level of care    Document interventions on the Cabreraraciel Corbin Fall Risk Assessment    1.  Assess for fall risk factors  2.  Implement fall precautions  Outcome: Progressing  Note: Patient will continue to use call light when he needs to ambulate to remain free from falls.      Problem: Gastrointestinal Irritability  Goal: Nausea and vomiting will be absent or improve  Description: Target End Date:  Prior to discharge or change in level of care    Document on I/O and Assessment flowsheets    1.  Assess for history, duration, frequency, severity, and potential precipitating factors  2.  Administer antiemetics as ordered  3.  Emesis basin within easy reach of the patient, but out of sight if psychogenic component  4.  Eliminate strong odors from surroundings  5.  Introduce cold water, ice chips, madelyn products, and room temperature broth or bouillon if  tolerated and appropriate to the patient's diet  6.  Encourage small amounts of bland, simple foods like broth, rice, bananas, Jell-O, crackers or toast if tolerated and appropriate to patient's diet  7.  Position the patient upright while eating and for 1 to 2 hours post-meal  8.  Encourage nonpharmacological nausea control techniques such as relaxation, guided imagery, music therapy, distraction, or deep breathing exercises  Outcome: Progressing  Note: Patient will report any new symptoms of nausea of vomiting.

## 2023-04-15 NOTE — DIETARY
"Nutrition services: Day 1 of admit.  Tomsa Jean-Baptiste is a 22 y.o. male with admitting DX of intractable vomiting/neutropenia and ALL (Acute lymphoid leukemia) in remission.     Consult received for unintentional weight loss of 2-13 lbs in <1 week due to decreased appetite (MST 2). RD visited pt at bedside. Pt with eyes closed asking to sleep during RD attempted interview. Did note mom is bringing in food-RD observed pt with fast food bags at bedside. Per chart review pt usually does not receive hospital trays as he does not like the food and does not want to waste. Currently does not have a diet order, RD encourages intake of all family brought in food and offered to add supplements or snacks as needed.       Assessment:  Height: 165.1 cm (5' 5\")  Weight: 54.1 kg (119 lb 4.3 oz) via standing scale   Body mass index is 19.85 kg/m²., BMI classification: normal but low for age  Diet/Intake: No diet order    Evaluation:   Pt with reported weight loss. Per chart review pt is down 45 lbs (27%) in <1 year. This is significant weight loss. Pt thin appearing however sleeping so NFPE not appropriate.   4/14/2023: 119 lbs via standing  5/28/2022: 164 lbs via standing   Current clinical picture and MD progress notes reviewed. Pt with ALL, most recent chemotherapy 4/11/2023. Per MD note since receiving chemotherapy worsening and increased fatigue, SOB and persistent N/V and chills.   Labs reviewed   Meds: D5 with NaCl and KCL @ 100 mL/hr,   Skin: no wounds or edema   +BM 4/13 per pt     Malnutrition Risk: Pt with severe malnutrition in the context of a chronic illness related to ALL and chemotherapy as evidenced by severe weight loss of 45 lbs (27%) in <1 year (~11 months) and decreased oral intake (suspected <75% of normal intake for >/= 1 month).     Recommendations/Plan:  Encourage intake of all meals   Diet per family   Document intake of all meals  as % taken in ADL's to provide interdisciplinary communication " across all shifts.   Monitor weight.    RD following

## 2023-04-15 NOTE — PROGRESS NOTES
Pt alert and oriented x 4. Denies pain. C/o nausea. Medicated with IV zofran with some relief. No emesis. Pt with noted rigors but with no overt fever. Port to R chest accessed. Blood culture collected and sent. Antibiotics and fluid bolus completed. Pt appears more comfortable. BP stable. Resting comfortably at this time. Call light within reach. Frequent assessment in place.

## 2023-04-16 PROBLEM — R11.2 NAUSEA AND VOMITING: Status: ACTIVE | Noted: 2023-04-16

## 2023-04-16 LAB
ABO GROUP BLD: NORMAL
ALBUMIN SERPL BCP-MCNC: 3.1 G/DL (ref 3.2–4.9)
ALBUMIN/GLOB SERPL: 1.4 G/DL
ALP SERPL-CCNC: 88 U/L (ref 30–99)
ALT SERPL-CCNC: 23 U/L (ref 2–50)
ANION GAP SERPL CALC-SCNC: 8 MMOL/L (ref 7–16)
AST SERPL-CCNC: 23 U/L (ref 12–45)
BARCODED ABORH UBTYP: 5100
BARCODED PRD CODE UBPRD: NORMAL
BARCODED UNIT NUM UBUNT: NORMAL
BASOPHILS # BLD AUTO: 0.9 % (ref 0–1.8)
BASOPHILS # BLD: 0.01 K/UL (ref 0–0.12)
BILIRUB SERPL-MCNC: 0.5 MG/DL (ref 0.1–1.5)
BLD GP AB SCN SERPL QL: NORMAL
BUN SERPL-MCNC: 10 MG/DL (ref 8–22)
CALCIUM ALBUM COR SERPL-MCNC: 8.9 MG/DL (ref 8.5–10.5)
CALCIUM SERPL-MCNC: 8.2 MG/DL (ref 8.5–10.5)
CHLORIDE SERPL-SCNC: 105 MMOL/L (ref 96–112)
CO2 SERPL-SCNC: 21 MMOL/L (ref 20–33)
COMPONENT R 8504R: NORMAL
CREAT SERPL-MCNC: 0.52 MG/DL (ref 0.5–1.4)
EOSINOPHIL # BLD AUTO: 0 K/UL (ref 0–0.51)
EOSINOPHIL NFR BLD: 0 % (ref 0–6.9)
ERYTHROCYTE [DISTWIDTH] IN BLOOD BY AUTOMATED COUNT: 77 FL (ref 35.9–50)
GFR SERPLBLD CREATININE-BSD FMLA CKD-EPI: 147 ML/MIN/1.73 M 2
GLOBULIN SER CALC-MCNC: 2.2 G/DL (ref 1.9–3.5)
GLUCOSE SERPL-MCNC: 92 MG/DL (ref 65–99)
HCT VFR BLD AUTO: 21.7 % (ref 42–52)
HGB BLD-MCNC: 7 G/DL (ref 14–18)
LYMPHOCYTES # BLD AUTO: 0.29 K/UL (ref 1–4.8)
LYMPHOCYTES NFR BLD: 20.8 % (ref 22–41)
MANUAL DIFF BLD: ABNORMAL
MCH RBC QN AUTO: 31.3 PG (ref 27–33)
MCHC RBC AUTO-ENTMCNC: 32.3 G/DL (ref 33.7–35.3)
MCV RBC AUTO: 96.9 FL (ref 81.4–97.8)
MONOCYTES # BLD AUTO: 0 K/UL (ref 0–0.85)
MONOCYTES NFR BLD AUTO: 0 % (ref 0–13.4)
NEUTROPHILS # BLD AUTO: 1.1 K/UL (ref 1.82–7.42)
NEUTROPHILS NFR BLD: 78.3 % (ref 44–72)
PLATELET # BLD AUTO: 42 K/UL (ref 164–446)
PMV BLD AUTO: 9.6 FL (ref 9–12.9)
POTASSIUM SERPL-SCNC: 4.2 MMOL/L (ref 3.6–5.5)
PRODUCT TYPE UPROD: NORMAL
PROT SERPL-MCNC: 5.3 G/DL (ref 6–8.2)
RBC # BLD AUTO: 2.24 M/UL (ref 4.7–6.1)
RH BLD: NORMAL
SODIUM SERPL-SCNC: 134 MMOL/L (ref 135–145)
UNIT STATUS USTAT: NORMAL
WBC # BLD AUTO: 1.4 K/UL (ref 4.8–10.8)

## 2023-04-16 PROCEDURE — 700101 HCHG RX REV CODE 250: Performed by: PEDIATRICS

## 2023-04-16 PROCEDURE — 700105 HCHG RX REV CODE 258: Performed by: PEDIATRICS

## 2023-04-16 PROCEDURE — 86923 COMPATIBILITY TEST ELECTRIC: CPT

## 2023-04-16 PROCEDURE — A9270 NON-COVERED ITEM OR SERVICE: HCPCS | Performed by: PEDIATRICS

## 2023-04-16 PROCEDURE — 86900 BLOOD TYPING SEROLOGIC ABO: CPT

## 2023-04-16 PROCEDURE — 86850 RBC ANTIBODY SCREEN: CPT

## 2023-04-16 PROCEDURE — 36415 COLL VENOUS BLD VENIPUNCTURE: CPT

## 2023-04-16 PROCEDURE — 85027 COMPLETE CBC AUTOMATED: CPT

## 2023-04-16 PROCEDURE — 770004 HCHG ROOM/CARE - ONCOLOGY PRIVATE *

## 2023-04-16 PROCEDURE — 700111 HCHG RX REV CODE 636 W/ 250 OVERRIDE (IP): Performed by: PEDIATRICS

## 2023-04-16 PROCEDURE — 700102 HCHG RX REV CODE 250 W/ 637 OVERRIDE(OP): Performed by: PEDIATRICS

## 2023-04-16 PROCEDURE — 86901 BLOOD TYPING SEROLOGIC RH(D): CPT

## 2023-04-16 PROCEDURE — 86945 BLOOD PRODUCT/IRRADIATION: CPT

## 2023-04-16 PROCEDURE — 80053 COMPREHEN METABOLIC PANEL: CPT

## 2023-04-16 PROCEDURE — P9016 RBC LEUKOCYTES REDUCED: HCPCS

## 2023-04-16 PROCEDURE — 85007 BL SMEAR W/DIFF WBC COUNT: CPT

## 2023-04-16 PROCEDURE — 99233 SBSQ HOSP IP/OBS HIGH 50: CPT | Performed by: PEDIATRICS

## 2023-04-16 PROCEDURE — 36430 TRANSFUSION BLD/BLD COMPNT: CPT

## 2023-04-16 RX ORDER — DIPHENHYDRAMINE HYDROCHLORIDE 50 MG/ML
12.5 INJECTION INTRAMUSCULAR; INTRAVENOUS ONCE
Status: COMPLETED | OUTPATIENT
Start: 2023-04-16 | End: 2023-04-16

## 2023-04-16 RX ADMIN — IMATINIB MESYLATE 600 MG: 400 TABLET, FILM COATED ORAL at 08:49

## 2023-04-16 RX ADMIN — DEXTROSE MONOHYDRATE, SODIUM CHLORIDE, AND POTASSIUM CHLORIDE: 50; 4.5; 1.49 INJECTION, SOLUTION INTRAVENOUS at 17:46

## 2023-04-16 RX ADMIN — DIPHENHYDRAMINE HYDROCHLORIDE 12.5 MG: 50 INJECTION, SOLUTION INTRAMUSCULAR; INTRAVENOUS at 12:57

## 2023-04-16 RX ADMIN — CEFEPIME 2 G: 2 INJECTION, POWDER, FOR SOLUTION INTRAVENOUS at 11:11

## 2023-04-16 RX ADMIN — ONDANSETRON 8 MG: 2 INJECTION INTRAMUSCULAR; INTRAVENOUS at 07:43

## 2023-04-16 RX ADMIN — SULFAMETHOXAZOLE AND TRIMETHOPRIM 2 TABLET: 800; 160 TABLET ORAL at 08:49

## 2023-04-16 RX ADMIN — FAMOTIDINE 20 MG: 20 TABLET, FILM COATED ORAL at 17:00

## 2023-04-16 RX ADMIN — DEXTROSE MONOHYDRATE, SODIUM CHLORIDE, AND POTASSIUM CHLORIDE: 50; 4.5; 1.49 INJECTION, SOLUTION INTRAVENOUS at 03:39

## 2023-04-16 RX ADMIN — CEFEPIME 2 G: 2 INJECTION, POWDER, FOR SOLUTION INTRAVENOUS at 20:54

## 2023-04-16 RX ADMIN — ONDANSETRON 8 MG: 2 INJECTION INTRAMUSCULAR; INTRAVENOUS at 14:03

## 2023-04-16 RX ADMIN — FAMOTIDINE 20 MG: 20 TABLET, FILM COATED ORAL at 06:19

## 2023-04-16 RX ADMIN — ONDANSETRON 8 MG: 2 INJECTION INTRAMUSCULAR; INTRAVENOUS at 20:49

## 2023-04-16 NOTE — CARE PLAN
The patient is Watcher - Medium risk of patient condition declining or worsening    Shift Goals  Clinical Goals: Control N/V, monitor labs  Patient Goals: Rest, resolve n/v    Progress made toward(s) clinical / shift goals:        Problem: Knowledge Deficit - Standard  Goal: Patient and family/care givers will demonstrate understanding of plan of care, disease process/condition, diagnostic tests and medications  Description: Target End Date:  1-3 days or as soon as patient condition allows    Document in Patient Education    1.  Patient and family/caregiver oriented to unit, equipment, visitation policy and means for communicating concern  2.  Complete/review Learning Assessment  3.  Assess knowledge level of disease process/condition, treatment plan, diagnostic tests and medications  4.  Explain disease process/condition, treatment plan, diagnostic tests and medications  Outcome: Progressing  Note: Patient understands the need to continue to monitor his lab results.      Problem: Fall Risk  Goal: Patient will remain free from falls  Description: Target End Date:  Prior to discharge or change in level of care    Document interventions on the [facility]araciel Corbin Fall Risk Assessment    1.  Assess for fall risk factors  2.  Implement fall precautions  Outcome: Progressing  Note: Patient will stand slowly before starting to walk to help prevent n/v.      Problem: Gastrointestinal Irritability  Goal: Nausea and vomiting will be absent or improve  Description: Target End Date:  Prior to discharge or change in level of care    Document on I/O and Assessment flowsheets    1.  Assess for history, duration, frequency, severity, and potential precipitating factors  2.  Administer antiemetics as ordered  3.  Emesis basin within easy reach of the patient, but out of sight if psychogenic component  4.  Eliminate strong odors from surroundings  5.  Introduce cold water, ice chips, madelyn products, and room temperature broth or bouillon  if tolerated and appropriate to the patient's diet  6.  Encourage small amounts of bland, simple foods like broth, rice, bananas, Jell-O, crackers or toast if tolerated and appropriate to patient's diet  7.  Position the patient upright while eating and for 1 to 2 hours post-meal  8.  Encourage nonpharmacological nausea control techniques such as relaxation, guided imagery, music therapy, distraction, or deep breathing exercises  Outcome: Progressing  Note: Patient will report if n/v have no improved after administration of medication.

## 2023-04-16 NOTE — PROGRESS NOTES
Pediatric Hematology/Oncology  Daily Progress Note      Patient Name:  Tomas Jean-Baptiste  : 2000  MRN: [id number]    Location of Service:  [facility]  Date of Service: 2023  Time: 04:35 PM    Hospital Day: 3    Protocol / Treatment Plan: Protocol/Treatment Plan: [place] Chromosome Precursor B-Cell Acute Lymphoblastic Leukemia, ON STUDY [id number], Interim Maintenance, Day 26    SUBJECTIVE:     Fatigue/tiredness somewhat improved after blood transfusion today. Afebrile with Tmax of 99.4 F today morning. Blood culture still shows no growth, remains on empiric Cefepime. No chills since admission. Intermittently tachycardic but overall hear rate improved. BP remains stable. No emesis but continues to have nausea. Getting scheduled Zofran and PRN ativan which is helping. Does not want additional anti-emetics, but OK with trying one time dose of benadryl. Ate dinner well yesterday but does not want to eat breakfast this morning. Stooling and voiding well. Still with some discomfort  at his surgical scar on his left shoulder. No neurologic changes. No easy bruising or rash.     Review of Systems:     Constitutional: Afebrile, fatigue somewhat improved. Appetite decreased but still eating some.  HENT: Negative for auditory changes or ear pain, no nosebleeds, no congestion and rhinorrhea, no sore throat.  No mouth sores.  Eyes: Negative for visual changes.  Respiratory: Negative for shortness of breath or noisy breathing.   Cardiovascular: Negative for chest pain and leg swelling.    Gastrointestinal: Negative for abdominal pain, diarrhea, constipation and blood in stool.  Positive for nausea. No vomiting.  Genitourinary: Negative for dysuria..  Musculoskeletal: Discomfort at his surgical scar on his left shoulder  Skin: Negative for rash or skin infection.  Neurological: Negative for numbness, tingling, sensory changes, weakness or headaches.    Endo/Heme/Allergies: No  "bruising/bleeding easily.    Psychiatric/Behavioral: Tired, flat affect    OBJECTIVE:     Max Temp: Temp (24hrs), Av.9 °C (98.5 °F), Min:36.8 °C (98.2 °F), Max:37.2 °C (98.9 °F)      Vitals: /76   Pulse 83   Temp 36.8 °C (98.3 °F) (Oral)   Resp 17   Ht 1.651 m (5' 5\")   Wt 54.1 kg (119 lb 4.3 oz)   SpO2 100%   BMI 19.85 kg/m²     I/O:   Intake/Output Summary (Last 24 hours) at 2023 1635  Last data filed at 2023 1559  Gross per 24 hour   Intake 342 ml   Output 800 ml   Net -458 ml         Labs:    Most Recent Hematology Labs:    Lab Results   Component Value Date/Time    WBC 1.3 (LL) 04/15/2023 0129    HEMOGLOBIN 7.8 (L) 04/15/2023 0129    MCV 94.4 04/15/2023 0129    PLATELETCT 49 (L) 04/15/2023 0129    NEUTS 0.85 (L) 04/15/2023 0129       Most Recent Metabolic Panel:    Lab Results   Component Value Date/Time    POTASSIUM 3.8 04/15/2023 0129    CHLORIDE 106 04/15/2023 0129    CO2 23 04/15/2023 0129    GLUCOSE 94 04/15/2023 0129    BUN 14 04/15/2023 0129    CREATININE 0.63 04/15/2023 0129    CALCIUM 7.9 (L) 04/15/2023 0129    ANION 8.0 04/15/2023 0129       Physical Exam:    Constitutional: Appears slightly better. Thin.   HENT: Normocephalic and atraumatic. Alopecia.  No rhinorrhea. Oropharynx is clear and moist.   Eyes: Conjunctivae are normal. EOMI.   Neck: Normal range of motion of neck, no adenopathy.    Cardiovascular: Mildly tachycardic, regular rhythm. No murmur. DP/radial pulses 2+, cap refill < 2 sec  Pulmonary/Chest: Effort normall. No respiratory distress. Symmetric expansion.  No crackles or wheezes. Port to chest wall, dressing C/D/I  Abdomen: Soft. Bowel sounds are normal. No distension and no mass. There is no hepatosplenomegaly.    Genitourinary:  Deferred  Musculoskeletal: Normal range of motion of lower and upper extremities bilaterally. No tenderness to palpation of elbows, writs, hands, knees, ankles and feet bilaterally. Similar to exam since admission-Left shoulder " with full range of motion.  No tenderness to palpation of surgical scar, no fluctuance or pockets of fluid, no cellulitis or overlying skin changes.  Strength is decreased but decreased on both sides of the body.  Neurological: Alert and oriented to person and place. Exhibits normal muscle tone bilaterally in upper and lower extremities. Gait normal. Strength diminished throughout.  Skin: Skin is warm, dry and pink.  No rash or evidence of skin infection.  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    Clinically improving. Blood culture shows no growth so far. Vital signs overall stable except intermittent tachycardia. Emesis resolved but still nauseous. Able to eat well atleast 1-2 meals a day. Continue current management.    Instead of administering bactrim 1 tablet 2 times a day, it was ordered and administered as 2 tablets a day.      1) Clinical Sepsis: Improving             - Recent chemotherapy only 3 days ago on 4/11/2023 with Capizzi methotrexate - at the time, , abs monos 260             - Received Oncospar 4/12/2023             - Since receiving chemotherapy, clinical worsening with increased fatigue, shortness of breath, persistent nausea with vomiting and now chills             - Tachycardic on presentation, compensated with normal blood pressure at 125/76 mm Hg, slightly prolonged capillary refill             - No known source of infection at this time             - History of Port-A-Cath infection and severe septic shock with Klebsiella and E. coli in December 2022              - History of left shoulder infection with B. fragilis treated with 5 weeks of metronidazole             - Afebrile on presentation but with chills             - CBC drawn on admission and ANC 1860/microliters              - Given low threshold for septic shock and clinical sepsis picture, will treat as such             - Blood cultures (peripheral and central) drawn at admission 4/14/2023: NGTD             - NS bolus x 1 prior to initiation of antibiotics             - Cefepime 2 g IV Q8 hours: Will continue             - Monitoring blood pressure, heart rate closely while administering antibiotics                - Consider adding metronidazole to antimicrobials given prior history of B. 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  - Testicular status NEGATIVE       - Flow cytometry of both 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, FISH positive for BCR-ABL1 translocation  - Enrolled on [facility] [id number]  - Initially enrolled on [facility] [id number] - but 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                            - Enrolled on [facility] [id number] and began study 6/13/2022              - Started imatinib therapy 6/3/2022   - End of Induction IA and IB MRD negative  - Imatinib dose increased to 400 mg PO AM and 250 mg PM 9/29/2022  -* Note:  All imatinib doses given inpatient rounded down to 600 mg  - Imatinib held for Septic Shock 12/27/2022-1/8/2023  - Imatinib 600 mg PO daily restarted 1/8/2023 inpatient  - Imatinib 400 mg PO QAM and 250 mg PO QHS 1/14/2023-1/17/2023  - Imatinib 600 mg PO QAM 1/17/2023 - 3/30/2023                      [id number], AR Arm B, Interim Maintenance, Day 25:      ** Continue imatinib 600 mg PO daily         3) Vomiting: resolved, still nauseous  - Possibly due to Oncaspar   - Alternate etiologies include clinical sepsis               - Provide antiemetic support: Scheduled Zofran every 8 hrs, Ativan every 6 hrs PRN, benadryl 12.5 mg x 1 dose today. Patient does not want to try more anti-emetics             - Continue famotidine BID              - Fluids at maintenance following bolus     4) History of Soft Tissue Infection / Joint Infection with Bacteroides fragilis:  - Edema and pain continue to improve  - Was initially on cefepime for F&N, added IV Vancomycin on 2/10/2023, discontinued both cefepime and vancomycin on 2/20/2023 after start of IV Flagyl (below)  - S/P open exploration/drainage 2/17/2023  - Culture: B.fragilis (both deep and superficial cultures)   - ID Consultation with Dr. Singh - recommendation for switch to metronidazole for 4+ weeks  - Completed 37 days of metronidazole  - Therapy discontinued 3/27/2023  - Clinical sepsis as above possibly reemergence of B.fragilis  -Strongly consider adding metronidazole     5) Left Shoulder Pain (RESOLVED):  - Left shoulder pain had completely resolved, now with some mild discomfort of scar but otherwise no signs of active infection at this time     6) Chemotherapy Related Pancytopenia:  - WBC-1400/microliters, Hemoglobin-7.0 gm/dL and platelets-42,000/microliters  - ANC: 1100/microliters, ALC- 290/microliters  - Transfuse for hemoglobin less than 7.5 or symptomatic  - Transfuse for platelets less than 10,000 or symptomatic  - S/p pRBCs transfusion today 4/16/2023     7) At Risk for DVT Secondary to PEG Asparaginase:   - Received PEG asparaginase 4/12/2023  - Resume apixaban 2.5 mg PO BID next week, before 4/19/2023         8) Tachycardia (STABLE):   - Previous cardiac work-up to include echocardiogram as well as telemetry  - Has had intermittent tachycardia  since severe septic shock in December 2022  - Now with worsening tachycardia palpitations     9) History of Peptic Ulcer Disease/Bleeding:  - Continue famotidine BID to help prevent gastritis (given ongoing nausea/emesis)     10) At Risk of Opportunistic Lung Infection:  - Bactrim DS PO BID Sat and Sun for PJP prophylaxis (ordered and so given as 2 tablets q day)     11) Central Access:   - R Port-A-Cath in place      Research Participant:           [facility] - Source Data         Diagnosis: Ph+ Precursor B-Cell Acute Lymphoblastic Leukemia     Disease Status: EOI1A MRD NEGATIVE, EOI1B RD NEGATIVE, CNS3c, testicular negative, HSV1 IgG POSITIVE, CMV IgG NEGATIVE, VARICELLA IgG POSITIVE     Active Studies: [id number], [id number]                                                                                                      Inactive Studies: [id number]                                                                                                                                                Optional Studies: None             Protocol: International Phase 3 trial in [place] 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      Treatment Plan: [id number](OS), AR-Arm B, Interim Maintenance, Day 26     Height: 1.650 m      Weight: 64.9kg       BSA: 1.73 m²   (Interim Maintenance, Day 1 2/27/2023)                                                                                                                                           Performance Status: Karnofsky 60, ECOG  3 (4/16/2023)     Treatment Plan Medications:  (100% adherent with imatinib)     Imatinib 600 mg QAM     Evaluations / Study Labs:       None     Therapy Given:      None     Toxicities:    Admission for clinical sepsis 4/14/2023         Disposition: Discharge pending improvement in nausea/emesis and improvement in clincial sepsis.      Alyce  MD Henrique  Pediatric Hematology / Oncology  [facility]  Cell. [phone]  [facility]. [phone]

## 2023-04-16 NOTE — PROGRESS NOTES
Patient with n/v unrelieved. Spoke with Dr. Duenas at 2115 in regards to patient. Dr. Duenas recommended that  I give the zofran 1 hr early.  She then advised me to wait and see if that works, if not, give 0.25 of ativan as a one time order if symptoms have not improved.

## 2023-04-16 NOTE — CARE PLAN
The patient is Watcher - Medium risk of patient condition declining or worsening    Shift Goals  Clinical Goals: nausea management, monitor hemodynamics  Patient Goals: rest, discharge    Progress made toward(s) clinical / shift goals:    Problem: Fall Risk  Goal: Patient will remain free from falls  Outcome: Progressing  Note: Pt calls appropriately when needing assistance     Problem: Communication  Goal: The ability to communicate needs accurately and effectively will improve  Outcome: Progressing  Note: Pt asks questions regarding plan of care. All questions answered by nurse and physician.       Patient is not progressing towards the following goals:

## 2023-04-16 NOTE — PROGRESS NOTES
"Pediatric Hematology/Oncology  Daily Progress Note      Patient Name:  Tomas Jean-Baptiste  : 2000  MRN: [id number]    Location of Service:  [facility]  Date of Service: 4/15/2023  Time: 11:28 PM    Hospital Day: 2    Protocol / Treatment Plan: Protocol/Treatment Plan: [place]kinson Chromosome Precursor B-Cell Acute Lymphoblastic Leukemia, ON STUDY [id number], Interim Maintenance, Day 25    SUBJECTIVE:     Overall feeling tired. Continues to remain afebrile with sterile blood culture, on empiric Cefepime. No chills since admission. Intermittently tachycardic but overall hear rate improved. BP remains stable. Continues to have nausea and emesis for which he is getting intermittent dosing of ativan which helps with symptom management. Remains on scheduled Zofran. Ate dinner well yesterday. Stooling and voiding well. No reports of pain or aches. Continues to report \"discomfort\" at his surgical scar on his left shoulder.  He reports that it feels like the scar itself is occasionally ripping or opening but no reports of deep pain. No neurologic changes. No easy bruising or rash.     Review of Systems:     Constitutional: Afebrile, tired. Appetite decreased but still eating some.  HENT: Negative for auditory changes or ear pain, no nosebleeds, no congestion and rhinorrhea, no sore throat.  No mouth sores.  Eyes: Negative for visual changes.  Respiratory: Negative for shortness of breath or noisy breathing.   Cardiovascular: Negative for chest pain and leg swelling.    Gastrointestinal: Negative for abdominal pain, diarrhea, constipation and blood in stool.  Positive for nausea, vomiting.   Genitourinary: Negative for dysuria..  Musculoskeletal: Discomfort at his surgical scar on his left shoulder  Skin: Negative for rash or skin infection.  Neurological: Negative for numbness, tingling, sensory changes, weakness or headaches.    Endo/Heme/Allergies: No bruising/bleeding easily.  " "  Psychiatric/Behavioral: Tired, flat affect    OBJECTIVE:     Max Temp: Temp (24hrs), Av.9 °C (98.5 °F), Min:36.8 °C (98.2 °F), Max:37.2 °C (98.9 °F)      Vitals: /78   Pulse 88   Temp 36.8 °C (98.2 °F) (Oral)   Resp 18   Ht 1.651 m (5' 5\")   Wt 54.1 kg (119 lb 4.3 oz)   SpO2 99%   BMI 19.85 kg/m²     I/O:   Intake/Output Summary (Last 24 hours) at 4/15/2023 2328  Last data filed at 4/15/2023 0309  Gross per 24 hour   Intake --   Output 100 ml   Net -100 ml       Labs:    Most Recent Hematology Labs:    Lab Results   Component Value Date/Time    WBC 1.3 (LL) 04/15/2023 0129    HEMOGLOBIN 7.8 (L) 04/15/2023 0129    MCV 94.4 04/15/2023 0129    PLATELETCT 49 (L) 04/15/2023 0129    NEUTS 0.85 (L) 04/15/2023 0129       Most Recent Metabolic Panel:    Lab Results   Component Value Date/Time    POTASSIUM 3.8 04/15/2023 0129    CHLORIDE 106 04/15/2023 0129    CO2 23 04/15/2023 0129    GLUCOSE 94 04/15/2023 0129    BUN 14 04/15/2023 0129    CREATININE 0.63 04/15/2023 0129    CALCIUM 7.9 (L) 04/15/2023 0129    ANION 8.0 04/15/2023 0129       Physical Exam:    Constitutional: Appears slightly better compared to yesterday. Thin.   HENT: Normocephalic and atraumatic. Alopecia.  No rhinorrhea. Oropharynx is clear and moist.   Eyes: Conjunctivae are normal. EOMI.   Neck: Normal range of motion of neck, no adenopathy.    Cardiovascular: Mildly tachycardic, regular rhythm. No murmur. DP/radial pulses 2+, cap refill < 2 sec  Pulmonary/Chest: Effort normall. No respiratory distress. Symmetric expansion.  No crackles or wheezes. Port to chest wall, dressing C/D/I  Abdomen: Soft. Bowel sounds are normal. No distension and no mass. There is no hepatosplenomegaly.    Genitourinary:  Deferred  Musculoskeletal: Normal range of motion of lower and upper extremities bilaterally. No tenderness to palpation of elbows, writs, hands, knees, ankles and feet bilaterally. Similar to exam yesterday by Dr. Faye-Left shoulder with " full range of motion.  No tenderness to palpation of surgical scar, no fluctuance or pockets of fluid, no cellulitis or overlying skin changes.  Strength is decreased but decreased on both sides of the body.  Neurological: Alert and oriented to person and place. Exhibits normal muscle tone bilaterally in upper and lower extremities. Gait normal. Strength diminished throughout  Skin: Skin is warm, dry and pink.  No rash or evidence of skin infection.  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    Clinically improving. Blood culture shows no growth so far. Vital signs overall stable except intermittent tachycardia. Emesis much improved but still nauseous. Able to eat well atleast 1-2 meals a day. Continue current management.     1) Clinical Sepsis: Improving             - Recent chemotherapy only 3 days ago on 4/11/2023 with Capizzi methotrexate - at the time, , abs monos 260             - Received Oncospar 4/12/2023             - Since receiving chemotherapy, clinical worsening with increased fatigue, shortness of breath, persistent nausea with vomiting and now chills             - Tachycardic on presentation, compensated with normal blood pressure at 125/76, slightly prolonged capillary refill             - No known source of infection at this time             - History of Port-A-Cath infection and severe septic shock with Klebsiella and E. coli in December 2022             - History of left shoulder infection with B. fragilis treated with 5 weeks of metronidazole             -   Afebrile on presentation but with chills             - CBC drawn on admission and ANC 1860/microliters              - Given low threshold for septic shock and clinical sepsis picture, will treat as such             - Blood cultures (peripheral and central) drawn at admission 4/14/2023: NGTD             - NS bolus x 1 prior to initiation of antibiotics             - Cefepime 2 g IV Q8 hours: Will continue             - Monitoring blood pressure, heart rate closely while administering antibiotics                - Consider adding metronidazole to antimicrobials given prior history of B. 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  - Testicular status NEGATIVE       - Flow cytometry of both 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, FISH positive for BCR-ABL1 translocation  - Enrolled on [facility] [id number]  - Initially enrolled on [facility] [id number] - but 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                            - Enrolled on [facility] [id number] and began study 6/13/2022              - Started imatinib therapy 6/3/2022   - End of Induction IA and IB MRD negative  - Imatinib dose increased to 400 mg PO AM and 250 mg PM 9/29/2022  -* Note:  All imatinib doses given inpatient rounded down to 600 mg  - Imatinib held for Septic Shock 12/27/2022-1/8/2023  - Imatinib 600 mg PO daily restarted 1/8/2023 inpatient  - Imatinib 400 mg PO QAM and 250 mg PO QHS 1/14/2023-1/17/2023  - Imatinib 600 mg PO QAM 1/17/2023 - 3/30/2023                     [id number], AR Arm B, Interim Maintenance, Day 25:      ** Continue imatinib 600 mg PO daily         3)  Vomiting: Improving, still nauseous  - Possibly due to Oncaspar   - Alternate etiologies include clinical sepsis               - Provide antiemetic support: Scheduled Zofran every 8 hrs, Ativan every 6 hrs PRN             - Continue famotidine BID              - Fluids at maintenance following bolus     4) History of Soft Tissue Infection / Joint Infection with Bacteroides fragilis:  - Edema and pain continue to improve  - Was initially on cefepime for F&N, added IV Vancomycin on 2/10/2023, discontinued both cefepime and vancomycin on 2/20/2023 after start of IV Flagyl (below)  - S/P open exploration/drainage 2/17/2023  - Culture: B.fragilis (both deep and superficial cultures)   - ID Consultation with Dr. Singh - recommendation for switch to metronidazole for 4+ weeks  - Completed 37 days of metronidazole  - Therapy discontinued 3/27/2023  - Clinical sepsis as above possibly reemergence of B.fragilis  -Strongly consider adding metronidazole     5) Left Shoulder Pain (RESOLVED):  - Left shoulder pain had completely resolved, now with some mild discomfort of scar but otherwise no signs of active infection at this time     6) Chemotherapy Related Pancytopenia:  - WBC-1300/microliters, Hemoglobin-7.8 gm/dL and platelets-49,000/microliters  - ANC: 850/microliters, ALC- 400/microliters  - Transfuse for hemoglobin less than 7.5 or symptomatic  - Transfuse for platelets less than 10,000 or symptomatic  - No indication for transfusion today     7) At Risk for DVT Secondary to PEG Asparaginase:   - Received PEG asparaginase 4/12/2023  - Resume apixaban 2.5 mg PO BID next week, before 4/19/2023         8) Tachycardia (STABLE):   - Previous cardiac work-up to include echocardiogram as well as telemetry  - Has had intermittent tachycardia since severe septic shock in December 2022  - Now with worsening tachycardia palpitations     9) History of Peptic Ulcer Disease/Bleeding:  - Continue famotidine BID to help prevent  gastritis (given ongoing nausea/emesis)     10) At Risk of Opportunistic Lung Infection:  - Bactrim DS PO BID Sat and Sun for PJP prophylaxis     11) Central Access:   - R Port-A-Cath in place      Research Participant:           [facility] - Source Data         Diagnosis: Ph+ Precursor B-Cell Acute Lymphoblastic Leukemia     Disease Status: EOI1A MRD NEGATIVE, EOI1B RD NEGATIVE, CNS3c, testicular negative, HSV1 IgG POSITIVE, CMV IgG NEGATIVE, VARICELLA IgG POSITIVE     Active Studies: [id number], [id number]                                                                                                      Inactive Studies: [id number]                                                                                                                                                Optional Studies: None             Protocol: International Phase 3 trial in [place] 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      Treatment Plan: [id number](OS), AR-Arm B, Interim Maintenance, Day 25     Height: 1.650 m      Weight: 64.9kg       BSA: 1.73 m²   (Interim Maintenance, Day 1 2/27/2023)                                                                                                                                           Performance Status: Karnofsky 60, ECOG  3 (4/15/2023)     Treatment Plan Medications:  (100% adherent with imatinib)     Imatinib 600 mg QAM     Evaluations / Study Labs:       None     Therapy Given:      None     Toxicities:    Admission for clinical sepsis 4/14/2023         Disposition: Discharge pending improvement in nausea/emesis and improvement in clincial sepsis.      Alyec Duenas MD  Pediatric Hematology / Oncology  [facility]  Cell. [phone]  [facility]. [phone]

## 2023-04-17 ENCOUNTER — PHARMACY VISIT (OUTPATIENT)
Dept: PHARMACY | Facility: MEDICAL CENTER | Age: 23
End: 2023-04-17
Payer: COMMERCIAL

## 2023-04-17 ENCOUNTER — HOSPITAL ENCOUNTER (OUTPATIENT)
Dept: RADIATION ONCOLOGY | Facility: MEDICAL CENTER | Age: 23
End: 2023-04-17

## 2023-04-17 VITALS
HEIGHT: 65 IN | WEIGHT: 119.27 LBS | DIASTOLIC BLOOD PRESSURE: 83 MMHG | TEMPERATURE: 99.2 F | BODY MASS INDEX: 19.87 KG/M2 | SYSTOLIC BLOOD PRESSURE: 123 MMHG | RESPIRATION RATE: 18 BRPM | HEART RATE: 111 BPM | OXYGEN SATURATION: 98 %

## 2023-04-17 PROBLEM — R65.10 SIRS (SYSTEMIC INFLAMMATORY RESPONSE SYNDROME) (HCC): Status: RESOLVED | Noted: 2023-04-14 | Resolved: 2023-04-17

## 2023-04-17 LAB
ANION GAP SERPL CALC-SCNC: 10 MMOL/L (ref 7–16)
ANISOCYTOSIS BLD QL SMEAR: ABNORMAL
BASOPHILS # BLD AUTO: 0.9 % (ref 0–1.8)
BASOPHILS # BLD: 0.01 K/UL (ref 0–0.12)
BUN SERPL-MCNC: 8 MG/DL (ref 8–22)
CALCIUM SERPL-MCNC: 8.4 MG/DL (ref 8.5–10.5)
CHLORIDE SERPL-SCNC: 104 MMOL/L (ref 96–112)
CO2 SERPL-SCNC: 21 MMOL/L (ref 20–33)
CREAT SERPL-MCNC: 0.52 MG/DL (ref 0.5–1.4)
EOSINOPHIL # BLD AUTO: 0 K/UL (ref 0–0.51)
EOSINOPHIL NFR BLD: 0 % (ref 0–6.9)
ERYTHROCYTE [DISTWIDTH] IN BLOOD BY AUTOMATED COUNT: 69.1 FL (ref 35.9–50)
GFR SERPLBLD CREATININE-BSD FMLA CKD-EPI: 147 ML/MIN/1.73 M 2
GLUCOSE SERPL-MCNC: 93 MG/DL (ref 65–99)
HCT VFR BLD AUTO: 26.9 % (ref 42–52)
HGB BLD-MCNC: 9.1 G/DL (ref 14–18)
LYMPHOCYTES # BLD AUTO: 0.29 K/UL (ref 1–4.8)
LYMPHOCYTES NFR BLD: 19.1 % (ref 22–41)
MACROCYTES BLD QL SMEAR: ABNORMAL
MANUAL DIFF BLD: NORMAL
MCH RBC QN AUTO: 31.2 PG (ref 27–33)
MCHC RBC AUTO-ENTMCNC: 33.8 G/DL (ref 33.7–35.3)
MCV RBC AUTO: 92.1 FL (ref 81.4–97.8)
MICROCYTES BLD QL SMEAR: ABNORMAL
MONOCYTES # BLD AUTO: 0 K/UL (ref 0–0.85)
MONOCYTES NFR BLD AUTO: 0 % (ref 0–13.4)
MORPHOLOGY BLD-IMP: NORMAL
NEUTROPHILS # BLD AUTO: 1.2 K/UL (ref 1.82–7.42)
NEUTROPHILS NFR BLD: 80 % (ref 44–72)
NRBC # BLD AUTO: 0 K/UL
NRBC BLD-RTO: 0 /100 WBC
PLATELET # BLD AUTO: 44 K/UL (ref 164–446)
PLATELET BLD QL SMEAR: NORMAL
PMV BLD AUTO: 11.8 FL (ref 9–12.9)
POTASSIUM SERPL-SCNC: 4.2 MMOL/L (ref 3.6–5.5)
RBC # BLD AUTO: 2.92 M/UL (ref 4.7–6.1)
RBC BLD AUTO: PRESENT
SODIUM SERPL-SCNC: 135 MMOL/L (ref 135–145)
WBC # BLD AUTO: 1.5 K/UL (ref 4.8–10.8)

## 2023-04-17 PROCEDURE — 99238 HOSP IP/OBS DSCHRG MGMT 30/<: CPT | Performed by: PEDIATRICS

## 2023-04-17 PROCEDURE — 85025 COMPLETE CBC W/AUTO DIFF WBC: CPT

## 2023-04-17 PROCEDURE — 80048 BASIC METABOLIC PNL TOTAL CA: CPT

## 2023-04-17 PROCEDURE — 700111 HCHG RX REV CODE 636 W/ 250 OVERRIDE (IP): Performed by: PEDIATRICS

## 2023-04-17 PROCEDURE — 77290 THER RAD SIMULAJ FIELD CPLX: CPT | Mod: 26 | Performed by: RADIOLOGY

## 2023-04-17 PROCEDURE — 85007 BL SMEAR W/DIFF WBC COUNT: CPT

## 2023-04-17 PROCEDURE — RXMED WILLOW AMBULATORY MEDICATION CHARGE: Performed by: PEDIATRICS

## 2023-04-17 PROCEDURE — 77263 THER RADIOLOGY TX PLNG CPLX: CPT | Performed by: RADIOLOGY

## 2023-04-17 PROCEDURE — 77334 RADIATION TREATMENT AID(S): CPT | Performed by: RADIOLOGY

## 2023-04-17 PROCEDURE — 700105 HCHG RX REV CODE 258: Performed by: PEDIATRICS

## 2023-04-17 PROCEDURE — 77334 RADIATION TREATMENT AID(S): CPT | Mod: 26 | Performed by: RADIOLOGY

## 2023-04-17 PROCEDURE — 700102 HCHG RX REV CODE 250 W/ 637 OVERRIDE(OP): Performed by: PEDIATRICS

## 2023-04-17 PROCEDURE — 77290 THER RAD SIMULAJ FIELD CPLX: CPT | Performed by: RADIOLOGY

## 2023-04-17 PROCEDURE — 700101 HCHG RX REV CODE 250: Performed by: PEDIATRICS

## 2023-04-17 PROCEDURE — 77470 SPECIAL RADIATION TREATMENT: CPT | Mod: 26 | Performed by: RADIOLOGY

## 2023-04-17 PROCEDURE — A9270 NON-COVERED ITEM OR SERVICE: HCPCS | Performed by: PEDIATRICS

## 2023-04-17 RX ORDER — OLANZAPINE 5 MG/1
5 TABLET ORAL NIGHTLY
Qty: 30 TABLET | Refills: 1 | Status: SHIPPED | OUTPATIENT
Start: 2023-04-17 | End: 2023-05-05

## 2023-04-17 RX ORDER — HEPARIN SODIUM (PORCINE) LOCK FLUSH IV SOLN 100 UNIT/ML 100 UNIT/ML
300-500 SOLUTION INTRAVENOUS PRN
Status: DISCONTINUED | OUTPATIENT
Start: 2023-04-17 | End: 2023-04-17 | Stop reason: HOSPADM

## 2023-04-17 RX ORDER — FAMOTIDINE 20 MG/1
20 TABLET, FILM COATED ORAL 2 TIMES DAILY
Qty: 60 TABLET | Refills: 1 | Status: SHIPPED | OUTPATIENT
Start: 2023-04-17 | End: 2023-07-17 | Stop reason: SDUPTHER

## 2023-04-17 RX ADMIN — ONDANSETRON 8 MG: 2 INJECTION INTRAMUSCULAR; INTRAVENOUS at 06:03

## 2023-04-17 RX ADMIN — ONDANSETRON 8 MG: 2 INJECTION INTRAMUSCULAR; INTRAVENOUS at 13:18

## 2023-04-17 RX ADMIN — IMATINIB MESYLATE 600 MG: 400 TABLET, FILM COATED ORAL at 09:00

## 2023-04-17 RX ADMIN — CEFEPIME 2 G: 2 INJECTION, POWDER, FOR SOLUTION INTRAVENOUS at 06:09

## 2023-04-17 RX ADMIN — HEPARIN 500 UNITS: 100 SYRINGE at 13:18

## 2023-04-17 RX ADMIN — FAMOTIDINE 20 MG: 20 TABLET, FILM COATED ORAL at 06:03

## 2023-04-17 RX ADMIN — DEXTROSE MONOHYDRATE, SODIUM CHLORIDE, AND POTASSIUM CHLORIDE: 50; 4.5; 1.49 INJECTION, SOLUTION INTRAVENOUS at 04:26

## 2023-04-17 ASSESSMENT — PAIN DESCRIPTION - PAIN TYPE: TYPE: ACUTE PAIN

## 2023-04-17 NOTE — CARE PLAN
Problem: Knowledge Deficit - Standard  Goal: Patient and family/care givers will demonstrate understanding of plan of care, disease process/condition, diagnostic tests and medications  Outcome: Progressing     Problem: Fall Risk  Goal: Patient will remain free from falls  Outcome: Progressing     The patient is Watcher - Medium risk of patient condition declining or worsening    Shift Goals  Clinical Goals: decrease nausea, rest  Patient Goals: rest    Progress made toward(s) clinical / shift goals:  Pt educated on rad times    Patient is not progressing towards the following goals:

## 2023-04-17 NOTE — RADIATION PLANNING NOTES
DATE OF SERVICE: 4/17/2023    DIAGNOSIS: B-ALL, PH+, CNS3     DATE OF SERVICE: 4/17/2023    TYPE OF SIMULATION: Brain    GOAL OF TREATMENT:  [x] Curative  [] Palliative  [] Oligometastatic    CONTRAST:    [] IV Contrast  [] Oral Contrast               POSITION:    [x]  Supine  [] Prone     COMPLEX:  [x] Complex Blocking   []Arcs  [] Custom Blocks  [] >3 Sites    PROCEDURE: Patient Underwent CT.  Patient head was immobilized with a thermoplastic face mask.  films evaluated to ensure proper patient position.  CT obtained vertex to mid-cervical spine. Exam pushed to treatment planning system.    I have personally reviewed the relevant data, performed the target localization, and determined all relevant factors for this patient’s simulation.

## 2023-04-17 NOTE — RADIATION PLANNING NOTES
Clinical Treatment Planning Note    DATE OF SERVICE: 4/17/2023    DIAGNOSIS: B-ALL, PH+, CNS3       IMAGING REVIEWED:  [x] CT     [x] MRI     [] PET/CT     [] BONE SCAN     [] MAMMO     [] OTHER      TREATMENT INTENT:   [x] CURATIVE     [] MAINTENANCE     []  PALLIATIVE      []  SUPPORTIVE     []  PROPHYLACTIC     [] BENIGN     []  CONSOLIDATIVE      [] DEFINITIVE   []  OLOGIMETASTATIC      LINE OF TREATMENT:  [x] ADJUVANT   [] DEFINITIVE   [] NEOADJUVANT   [] RE-TREATMENT      TECHNIQUE PLANNED:  [] IMRT   [x] 3D   [] SBRT   [] SRS/SRT   [] HDR   [] ELECTRON       IMRT JUSTIFICATION:  []   An immediately adjacent area has been previously irradiated and abutting portals must be established with high precision.    []  Dose escalation is planned to deliver radiation doses in excess of those commonly utilized for similar tumors with conventional treatment.    []  The target volume is concave or convex, and the critical normal tissues are within or around that convexity or concavity.    []  The target volume is in close proximity to critical structures that must be protected.    []  The volume of interest must be covered with narrow margins to adequately protect  immediately adjacent structures.      FIELDS & BLOCKING:  [x] COMPLEX BLOCKS     []  = 3 TX AREAS     []  ARCS     []  CUSTOM SHEILD        []  SIMPLE BLOCK      CHEMOTHERAPY:  []  CONCURRENT     []  INDUCTION     [] SEQUENTIAL     [x]  <30 DAYS FROM XRT      NOTES:  OAR CONSTRAINTS: (GUIDELINES ONLY NOT ABSOLUTE)    Target Prescribed Coverage   PTV2 95% of PTV2 covered by 100% (Gy) of RX Dose     PTV1 95% of PTV1 covered by 100% (Gy) of RX Dose     PTV2 PTV2 minimum dose > 90% (Gy) of RX Dose     PTV1 PTV1 minimum dose > 90% (Gy) of RX Dose       JALEEL Goal   Brainstem Max Dose < 54Gy   Brainstem + 0.2cm Max Dose < 54Gy   Brainstem + 3mm Max Dose < 54Gy   Optic Chiasm Max Dose < 50Gy   Optic Chiasm + 3mm Max Dose < 50Gy   R Optic Nerve Max Dose < 50Gy   R Optic  Nerve + 3mm Max Dose < 50Gy   L Optic Nerve Max Dose < 50Gy   L Optic Nerve + 3mm Max Dose < 50Gy   R Eye Max Dose < 35Gy   L Eye Max Dose < 35Gy   R Lens Max Dose < 5-7Gy   L Lens Max Dose < 5-7Gy   Cord Max Dose < 50Gy   *RTOG 0913

## 2023-04-17 NOTE — CARE PLAN
The patient is Stable - Low risk of patient condition declining or worsening    Shift Goals  Clinical Goals: decrease nausea, rest  Patient Goals: rest    Progress made toward(s) clinical / shift goals:    Problem: Knowledge Deficit - Standard  Goal: Patient and family/care givers will demonstrate understanding of plan of care, disease process/condition, diagnostic tests and medications  Outcome: Progressing  Note: Pt educated on plan of care, pt verbalizes understanding and agrees to comply.      Problem: Fall Risk  Goal: Patient will remain free from falls  Outcome: Progressing  Note: Bed is locked and in lowest position, call light and personal belongings within reach. Pt uses call light appropriately.        Patient is not progressing towards the following goals:

## 2023-04-17 NOTE — DISCHARGE SUMMARY
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 Pancytopenia:  - Transfused pRBCs when hgb fell to 7.0 g/dL     6) History of Peptic Ulcer Disease/Bleeding:  - Continued famotidine BID to help prevent gastritis (given ongoing nausea/emesis)       JULY was discharged home on 4/17/2023.    HOME MEDICATIONS:      Current Facility-Administered Medications:     imatinib 600 mg p.o. daily    famotidine (PEPCID) tablet 20 mg p.o. BID    DIET:  Regular diet, age appropriate.      ACTIVITY:  Regular activity tolerated.  Instructed patient/family on thrombocytopenic and neutropenic precautions.    MEDICAL CONDITION:  Stable.    DISCHARGE INSTRUCTIONS:  F/U in [facility] on April 21 for (count-dependent) IV chemotherapy    ANN MARIE Rodriguez MD  Pediatric Hematology / Oncology  [facility] [phone] / Cell [phone]  [email or web address]    "

## 2023-04-17 NOTE — RADIATION PLANNING NOTES
PATIENT NAME Tomas Jean-Baptiste   PRIMARY PHYSICIAN Margarito Arvizu [id number]   REFERRING PHYSICIAN No ref. provider found 2001       DATE OF SERVICE: 4/17/2023    DIAGNOSIS: B-ALL, PH+, CNS3     SPECIAL TREATMENT PROCEDURE NOTE:  Considerable additional effort required in the management of this case because of administration of concurrent complex chemotherapy as per AALL 1631 which may result in increased normal tissue toxicity. This includes longer and possibly more frequent on treatment visits.

## 2023-04-18 NOTE — PROGRESS NOTES
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 a cultural problem is well and cultural genetic and everything else like that unfortunately breathalyzers such bad heart disease diabetes things like that  populations etry consistent with peripheral blood, cytogenetics remarkable for known t(9;22)  CSF with WBC 1, RBC 3, no blasts identified on cytospin  FISH results available 5/31/2022 making patient eligible for transfer from [place] [address] to [place] [address] as eligibility requirements were met for [place] [address]  Patient unenrolled from [place] [address] (BCR-ABL1 fusion positive) 6/1/2022  Consent signed for [place] [address] and patient enrolled 6/1/2022 (see eligibility checklist from 5/31/2022 and consent note from 6/1/2022)  Imatinib 400 mg PO QAM / 200 mg PO QPM started 6/3/2022 (allowed per [place] [address])  Patient completed the first 15 days of a Standard 4-drug Induction on 6/13/2022  Start of [facility](OS), Induction IA Part 2, Day 15 6/13/2022  End of Induction 1A Part 2 - MRD negative  Start of [facility]  [id number](OS), Induction IA Part 2, Day 15 7/5/2022  Induction IB DELAYED 2 weeks 14 days from 7/26/2022-8/9/2022) for myelosuppression - Start of Day 22 cytarabine block 8/9/2022  Induction IB Day 42 delayed 9 days for myelosuppression - Day 42 evaluations 9/7/2022  End of Induction IB - Flow cytometric MRD negative, MRD by IgH-TCR PCR [id number]%  Randomization to AR-Experimental Arm B of [id number]  Start of AR-Experimental Arm B, Interim Maintenance 9/29/2022  Weight based increase in dose of imatinib to 400 mg PO AM and 250 mg PM 9/29/2022  Thrombocytopenia not permissive of proceeding with Day 15 of Interim Maintenance  AR-Experimental Arm B, Interim Maintenance, Day 15 delayed 4 days, start 10/17/2022  AR-Experimental Arm B, Interim Maintenance, Day 29, start 11/1/2022  AR-Experimental Arm B, Interim Maintenance, Day 43, start 11/14/2022  Last does of 6-MP 11/27/2022  AR-Experimental Arm B, Delayed Intensification, Day 1, start 12/6/2022  Admission with Severe Septic Shock 12/27/2022  Imatinib HELD 12/27/2022-1/8/2023  AR-Experimental Arm B, Delayed Intensification, Day 29 DELAYED 14 days with start 1/17/2023  Hospitalization 2/7/2023 on Day 50 of Delayed Intensification with left shoulder pain ultimately diagnosed with Bacteroides fragilis infection  AR-Experimental Arm B, Interim Maintenance, Day 1 DELAYED 7 days with start 2/27/2023  AR-Experimental Arm B, Interim Maintenance, Day 11 DELAYED 4 days for platelets 43K on 3/9/2023  AR-Experimental Arm B, Interim Maintenance, Day 11 VCR given and MTX omitted for platelets 43K 3/13/2023  Imatinib HELD 3/30/2023-PRESENT  AR-Experimental Arm B, Interim Maintenance, Day 21 (DELAYED NOW 17 DAYS)     Past Medical History:    1) Previously Healthy  2) 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  3) Hyperleukocytosis  4) Hyperuricemia  5) Hyperphosphatemia  6) Right Lower Extremity Superficial Thrombus  7) Subsegmental Pulmonary Embolism  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   Two dogs.  Everyone is well in the house. Father not involved.     Family History:     Family History             Family History   Problem Relation Age of Onset    No Known Problems Mother      Diabetes Paternal Grandfather      Hypertension Paternal Grandfather      Hyperlipidemia Paternal Grandfather      Cancer Neg Hx      Heart Disease Neg Hx      Stroke Neg Hx           No significant family history of cancer.  Both maternal and paternal family history of diabetes.     Immunizations:  UTD    Medications:   Current Outpatient Medications on File Prior to Encounter   Medication Sig Dispense Refill    imatinib (GLEEVEC) 400 MG tablet Take 1 Tablet by mouth every day. Pt takes 200 mg + 400 mg for a total dose of 600 mg daily 30 Tablet 5    imatinib (GLEEVEC) 100 MG tablet Take 2 Tablets by mouth every day. Pt takes 200 mg + 400 mg for a total dose of 600 mg daily 60 Tablet 5    Omega-3 Fatty Acids (FISH OIL PO) Take 1 Capsule by mouth every day.      ondansetron (ZOFRAN ODT) 8 MG TABLET DISPERSIBLE Dissolve 1 Tablet by mouth every 6 hours as needed for Nausea/Vomiting. 10 Tablet 0    sulfamethoxazole-trimethoprim (BACTRIM DS) 800-160 MG tablet Take 2 Tablets by mouth in the morning every Sat and Sun.      vitamin D3 (CHOLECALCIFEROL) 1000 Unit (25 mcg) Tab Take 1 Tablet by mouth every day.      therapeutic  "multivitamin-minerals (THERAGRAN-M) Tab Take 1 Tablet by mouth every day.       No current facility-administered medications on file prior to encounter.         OBJECTIVE:     Vitals:   /89   Pulse (!) 110   Temp 37.1 °C (98.7 °F) (Temporal)   Resp 20   Ht 1.65 m (5' 4.96\")   Wt 56.2 kg (123 lb 14.4 oz)   SpO2 98%     Labs:  Hospital Outpatient Visit on 04/06/2023   Component Date Value    WBC 04/06/2023 1.2 (LL)     RBC 04/06/2023 2.70 (L)     Hemoglobin 04/06/2023 8.3 (L)     Hematocrit 04/06/2023 25.5 (L)     MCV 04/06/2023 94.4     MCH 04/06/2023 30.7     MCHC 04/06/2023 32.5 (L)     RDW 04/06/2023 76.0 (H)     Platelet Count 04/06/2023 63 (L)     MPV 04/06/2023 11.0     Neutrophils-Polys 04/06/2023 37.80 (L)     Lymphocytes 04/06/2023 43.30 (H)     Monocytes 04/06/2023 18.90 (H)     Eosinophils 04/06/2023 0.00     Basophils 04/06/2023 0.00     Nucleated RBC 04/06/2023 1.70     Neutrophils (Absolute) 04/06/2023 0.45 (LL)     Lymphs (Absolute) 04/06/2023 0.52 (L)     Monos (Absolute) 04/06/2023 0.23     Eos (Absolute) 04/06/2023 0.00     Baso (Absolute) 04/06/2023 0.00     NRBC (Absolute) 04/06/2023 0.02     Anisocytosis 04/06/2023 2+ (A)     Macrocytosis 04/06/2023 2+ (A)     Sodium 04/06/2023 142     Potassium 04/06/2023 3.4 (L)     Chloride 04/06/2023 108     Co2 04/06/2023 23     Anion Gap 04/06/2023 11.0     Glucose 04/06/2023 110 (H)     Bun 04/06/2023 6 (L)     Creatinine 04/06/2023 0.38 (L)     Calcium 04/06/2023 8.7     AST(SGOT) 04/06/2023 15     ALT(SGPT) 04/06/2023 11     Alkaline Phosphatase 04/06/2023 101 (H)     Total Bilirubin 04/06/2023 0.3     Albumin 04/06/2023 3.7     Total Protein 04/06/2023 6.6     Globulin 04/06/2023 2.9     A-G Ratio 04/06/2023 1.3     Direct Bilirubin 04/06/2023 <0.2     Indirect Bilirubin 04/06/2023 see below     Correct Calcium 04/06/2023 8.9     GFR (CKD-EPI) 04/06/2023 161     Manual Diff Status 04/06/2023 PERFORMED     Peripheral Smear Review 04/06/2023 " see below     Plt Estimation 04/06/2023 Decreased     RBC Morphology 04/06/2023 Present     Polychromia 04/06/2023 1+     Poikilocytosis 04/06/2023 1+     Ovalocytes 04/06/2023 1+       Physical Exam:    Constitutional: Well-developed, well-nourished, and in no distress.  Well-appearing.  HENT: Normocephalic and atraumatic. No nasal congestion or rhinorrhea. Oropharynx is clear and moist. No oral ulcerations or sores.    Eyes: Conjunctivae are normal. Pupils are equal, round.  Neck: Normal range of motion of neck, no adenopathy.    Cardiovascular: Normal rate, regular rhythm and normal heart sounds.  No murmur heard. DP/radial pulses 2+, cap refill < 2 sec.  Pulmonary/Chest: Effort normal and breath sounds normal. No respiratory distress. Symmetric expansion.  No crackles or wheezes.  Abdomen: Soft. Bowel sounds are normal. No distension and no mass. There is no hepatosplenomegaly.    Genitourinary:  Deferred.  Musculoskeletal: Normal range of motion of lower and upper extremities bilaterally.   Neurological: Alert and oriented to person and place. Exhibits normal muscle tone bilaterally in upper and lower extremities. Gait normal. Coordination normal.    Skin: Skin is warm, dry and pink.  No rash or evidence of skin infection.  No pallor.   Psychiatric: Mood and affect normal for age.    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 and whose End of Induction IB MRD was both negative by flow cytometry and PCR who presents for Interim Maintenance, Day 21 with Capizzi MTX  (DELAYED 17 DAYS)     1) Ph+ Precursor B-Cell Acute Lymphoblastic Leukemia, in MRD Remission:  - 2-3 weeks of symptoms  - Presenting WBC > 440 k/uL, hyperleukocytosis  - Start of Hydroxyurea (1500 mg PO Q8) 2320 on 5/27/2022  - discontinued after only 55 hours  - No steroid pretreatment  - 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  -  Testicular status NEGATIVE       - Flow cytometry of both 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, FISH positive for BCR-ABL1 translocation  - Enrolled on [facility] [id number]  - Initially enrolled on [facility] [id number] - but 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                            - Enrolled on [facility] [id number] and began study 6/13/2022              - Started imatinib therapy 6/3/2022   - End of Induction IA and IB MRD negative  - Imatinib dose increased to 400 mg PO AM and 250 mg PM 9/29/2022  -* Note:  All imatinib doses given inpatient rounded down to 600 mg  - Imatinib held for Septic Shock 12/27/2022-1/8/2023  - Imatinib 600 mg PO daily restarted 1/8/2023 inpatient  - Imatinib 400 mg PO QAM and 250 mg PO QHS 1/14/2023-1/17/2023  - Imatinib 600 mg PO QAM 1/17/2023 - 3/30/2023  - Imatinib on HOLD until recovery of counts                 - WBC 1.2, Hgb 8.3 platelets 63  - , , absolute monocyte count 230                              - ANC has fallen less than 500 however monocyte count of 234 and overall , platelets apparently rising from 46 to 63 also indicative of count recovery.  Will not perform bone marrow at this time given likely count recovery.  However as ANC is less than 500, will again hold chemotherapy until next week, 4/11/2023      [facility] [place] Arm B, Interim Maintenance, Day 21 (HOLD):      ** HOLD imatinib 600 mg PO daily      ** HOLD methotrexate IV  ** HOLD VCR IV  ** HOLD PEG-Aspariginase        - Return to clinic in 5 days on 4/11/2023 for delayed Interim Maintenance, Day 21     2) Soft Tissue Infection / Joint Infection with Bacteroides fragilis:  - Edema and pain continue to improve  - Was initially on cefepime for F&N, added IV Vancomycin on 2/10/2023, discontinued both cefepime and vancomycin on 2/20/2023 after start of IV Flagyl (below)  - S/P open exploration/drainage 2/17/2023  - Culture: B.fragilis (both deep and  superficial cultures)   - ID Consultation with Dr. Singh - recommendation for switch to metronidazole for 4+ weeks  - Flagyl DISCONTINUED 3/27/2023 after 5 weeks of therapy   - Neuropathies stable .     3) Left Shoulder Pain (GREATLY IMPROVED):  - Improved range of motion and function improved range of motion and function  - Not requiring pain medication     4) Chemotherapy Related Pancytopenia:  - WBC 1.2, Hgb 8.3 platelets 63  - , , absolute monocyte count 230  - Transfuse for hemoglobin less than 7.5 or symptomatic  - Transfuse for platelets less than 10,000 or symptomatic     5) At Risk for DVT Secondary to PEG Asparaginase:   - Received PEG asparaginase on Day 2 of Interim Maintenance 3/1/2023  - Will not restart apixiban until 7 days after Day 22 therapy              6) Tachycardia (STABLE):   - Previous cardiac work-up to include echocardiogram as well as telemetry  - Continues to have positional tachycardia and occasional palpitations likely due to combination of factors to include deconditioning and anemia     7) History of Peptic Ulcer Disease/Bleeding:  - No longer taking Pepcid     8) Poor Appetite / Nausea (IMPROVED):  - Reports occasional nausea, improved appetite            9) At Risk of Opportunistic Lung Infection:  - Bactrim DS PO BID Sat and Sun for PJP prophylaxis     10) Central Access:   - R Port-A-Cath in place      Research Participant:           [facility] - Source Data         Diagnosis: Ph+ Precursor B-Cell Acute Lymphoblastic Leukemia     Disease Status: EOI1A MRD NEGATIVE, EOI1B RD NEGATIVE, CNS3c, testicular negative, HSV1 IgG POSITIVE, CMV IgG NEGATIVE, VARICELLA IgG POSITIVE     Active Studies: [id number], [id number]                                                                                                      Inactive Studies: [id number]                                                                                                                                                 Optional Studies: None             Protocol: International Phase 3 trial in [place] 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      Treatment Plan: [id number](OS), AR-Arm B, Interim Maintenance, Day 21 (Delayed 17 days)     Height: 1.650 m      Weight: 64.9kg       BSA: 1.73 m²   (Interim Maintenance, Day 1 2/27/2023)                                                                                                                                           Performance Status: Karnofsky 70, ECOG  3 (3/9/2023)     Treatment Plan Medications:       Imatinib HELD 3/30/2023     Evaluations / Study Labs:  (4/6/2023)      WBC 1.2, Hgb 8.3 platelets 63  , , absolute monocyte count 230     Therapy Given: (4/6/2023)     Day 21 therapy DELAYED          Disposition: Return to clinic in 4/11/2023 for delayed Day 21 of Interim Maintenance      Pepe Faye MD  Pediatric Hematology / Oncology  [facility].  [phone]  [facility]. [phone]

## 2023-04-19 LAB
BACTERIA BLD CULT: NORMAL
BACTERIA BLD CULT: NORMAL
SIGNIFICANT IND 70042: NORMAL
SIGNIFICANT IND 70042: NORMAL
SITE SITE: NORMAL
SITE SITE: NORMAL
SOURCE SOURCE: NORMAL
SOURCE SOURCE: NORMAL

## 2023-04-20 NOTE — PROGRESS NOTES
"Pharmacy Chemotherapy Calculations Note:  No chemo given 4/21/23. Treatment deferred for low plts  Dx: B-ALL     Previous treatment: IM D22 on 4/12/23     Protocol: Interim Maintenance per Arm B of [id number] [facility] ID number: [id number]     4/11/23: Mtx dose reduction today, 80 mg/m2, per Dr Faye       /87   Pulse (!) 112   Temp 36.9 °C (98.5 °F) (Temporal)   Resp 20   Ht 1.654 m (5' 5.12\")   Wt 56.7 kg (125 lb)   SpO2 100%   BMI 20.73 kg/m²  Body surface area is 1.61 meters squared.    Labs from 4/21/23 reviewed - plts do not meet parameters, treatment deferred             Juyd Bryant, PharmD, BCOP            "

## 2023-04-21 ENCOUNTER — HOSPITAL ENCOUNTER (OUTPATIENT)
Dept: INFUSION CENTER | Facility: MEDICAL CENTER | Age: 23
End: 2023-04-21
Attending: PEDIATRICS
Payer: COMMERCIAL

## 2023-04-21 VITALS
OXYGEN SATURATION: 100 % | HEIGHT: 65 IN | BODY MASS INDEX: 20.83 KG/M2 | TEMPERATURE: 98.5 F | HEART RATE: 112 BPM | WEIGHT: 125 LBS | SYSTOLIC BLOOD PRESSURE: 124 MMHG | RESPIRATION RATE: 20 BRPM | DIASTOLIC BLOOD PRESSURE: 87 MMHG

## 2023-04-21 DIAGNOSIS — D75.89 MYELOSUPPRESSION AFTER CHEMOTHERAPY: ICD-10-CM

## 2023-04-21 DIAGNOSIS — G62.0 NEUROPATHY DUE TO CHEMOTHERAPEUTIC DRUG (HCC): ICD-10-CM

## 2023-04-21 DIAGNOSIS — Z51.11 ENCOUNTER FOR CHEMOTHERAPY MANAGEMENT: ICD-10-CM

## 2023-04-21 DIAGNOSIS — C91.01 ACUTE LYMPHOBLASTIC LEUKEMIA (ALL) IN REMISSION (HCC): ICD-10-CM

## 2023-04-21 DIAGNOSIS — T45.1X5A MYELOSUPPRESSION AFTER CHEMOTHERAPY: ICD-10-CM

## 2023-04-21 DIAGNOSIS — T45.1X5A NEUROPATHY DUE TO CHEMOTHERAPEUTIC DRUG (HCC): ICD-10-CM

## 2023-04-21 DIAGNOSIS — C91.Z0 B LYMPHOBLASTIC LEUKEMIA WITH T(9;22)(Q34;Q11.2);BCR-ABL1 (HCC): ICD-10-CM

## 2023-04-21 LAB
ALBUMIN SERPL BCP-MCNC: 3.3 G/DL (ref 3.2–4.9)
ALBUMIN/GLOB SERPL: 1.7 G/DL
ALP SERPL-CCNC: 116 U/L (ref 30–99)
ALT SERPL-CCNC: 48 U/L (ref 2–50)
ANION GAP SERPL CALC-SCNC: 8 MMOL/L (ref 7–16)
ANISOCYTOSIS BLD QL SMEAR: ABNORMAL
AST SERPL-CCNC: 37 U/L (ref 12–45)
BASOPHILS # BLD AUTO: 0.9 % (ref 0–1.8)
BASOPHILS # BLD: 0.01 K/UL (ref 0–0.12)
BILIRUB CONJ SERPL-MCNC: <0.2 MG/DL (ref 0.1–0.5)
BILIRUB INDIRECT SERPL-MCNC: NORMAL MG/DL (ref 0–1)
BILIRUB SERPL-MCNC: 0.4 MG/DL (ref 0.1–1.5)
BUN SERPL-MCNC: 13 MG/DL (ref 8–22)
CALCIUM ALBUM COR SERPL-MCNC: 8.4 MG/DL (ref 8.5–10.5)
CALCIUM SERPL-MCNC: 7.8 MG/DL (ref 8.5–10.5)
CHLORIDE SERPL-SCNC: 107 MMOL/L (ref 96–112)
CO2 SERPL-SCNC: 23 MMOL/L (ref 20–33)
CREAT SERPL-MCNC: 0.42 MG/DL (ref 0.5–1.4)
EOSINOPHIL # BLD AUTO: 0.02 K/UL (ref 0–0.51)
EOSINOPHIL NFR BLD: 1.7 % (ref 0–6.9)
ERYTHROCYTE [DISTWIDTH] IN BLOOD BY AUTOMATED COUNT: 74.3 FL (ref 35.9–50)
GFR SERPLBLD CREATININE-BSD FMLA CKD-EPI: 156 ML/MIN/1.73 M 2
GLOBULIN SER CALC-MCNC: 2 G/DL (ref 1.9–3.5)
GLUCOSE SERPL-MCNC: 105 MG/DL (ref 65–99)
HCT VFR BLD AUTO: 27.1 % (ref 42–52)
HGB BLD-MCNC: 8.6 G/DL (ref 14–18)
LYMPHOCYTES # BLD AUTO: 0.43 K/UL (ref 1–4.8)
LYMPHOCYTES NFR BLD: 31 % (ref 22–41)
MACROCYTES BLD QL SMEAR: ABNORMAL
MANUAL DIFF BLD: NORMAL
MCH RBC QN AUTO: 30.7 PG (ref 27–33)
MCHC RBC AUTO-ENTMCNC: 31.7 G/DL (ref 33.7–35.3)
MCV RBC AUTO: 96.8 FL (ref 81.4–97.8)
MONOCYTES # BLD AUTO: 0.02 K/UL (ref 0–0.85)
MONOCYTES NFR BLD AUTO: 1.7 % (ref 0–13.4)
MORPHOLOGY BLD-IMP: NORMAL
NEUTROPHILS # BLD AUTO: 0.91 K/UL (ref 1.82–7.42)
NEUTROPHILS NFR BLD: 64.7 % (ref 44–72)
NEUTS HYPERSEG BLD QL SMEAR: NORMAL
NRBC # BLD AUTO: 0 K/UL
NRBC BLD-RTO: 0 /100 WBC
PLATELET # BLD AUTO: 18 K/UL (ref 164–446)
PLATELET BLD QL SMEAR: NORMAL
POTASSIUM SERPL-SCNC: 4.1 MMOL/L (ref 3.6–5.5)
PROT SERPL-MCNC: 5.3 G/DL (ref 6–8.2)
RBC # BLD AUTO: 2.8 M/UL (ref 4.7–6.1)
RBC BLD AUTO: PRESENT
SODIUM SERPL-SCNC: 138 MMOL/L (ref 135–145)
WBC # BLD AUTO: 1.4 K/UL (ref 4.8–10.8)

## 2023-04-21 PROCEDURE — 700111 HCHG RX REV CODE 636 W/ 250 OVERRIDE (IP): Performed by: PEDIATRICS

## 2023-04-21 PROCEDURE — 82248 BILIRUBIN DIRECT: CPT

## 2023-04-21 PROCEDURE — 85025 COMPLETE CBC W/AUTO DIFF WBC: CPT

## 2023-04-21 PROCEDURE — A4212 NON CORING NEEDLE OR STYLET: HCPCS

## 2023-04-21 PROCEDURE — 85007 BL SMEAR W/DIFF WBC COUNT: CPT

## 2023-04-21 PROCEDURE — 36591 DRAW BLOOD OFF VENOUS DEVICE: CPT

## 2023-04-21 PROCEDURE — 99214 OFFICE O/P EST MOD 30 MIN: CPT | Performed by: PEDIATRICS

## 2023-04-21 PROCEDURE — 80053 COMPREHEN METABOLIC PANEL: CPT

## 2023-04-21 PROCEDURE — 82657 ENZYME CELL ACTIVITY: CPT

## 2023-04-21 PROCEDURE — 700101 HCHG RX REV CODE 250: Performed by: PEDIATRICS

## 2023-04-21 RX ORDER — 0.9 % SODIUM CHLORIDE 0.9 %
20 VIAL (ML) INJECTION PRN
Status: CANCELLED | OUTPATIENT
Start: 2023-04-21

## 2023-04-21 RX ORDER — HEPARIN SODIUM,PORCINE 10 UNIT/ML
30 VIAL (ML) INTRAVENOUS PRN
Status: CANCELLED | OUTPATIENT
Start: 2023-04-21

## 2023-04-21 RX ORDER — HEPARIN SODIUM (PORCINE) LOCK FLUSH IV SOLN 100 UNIT/ML 100 UNIT/ML
500 SOLUTION INTRAVENOUS PRN
Status: CANCELLED | OUTPATIENT
Start: 2023-04-21

## 2023-04-21 RX ORDER — LIDOCAINE AND PRILOCAINE 25; 25 MG/G; MG/G
CREAM TOPICAL PRN
Status: DISCONTINUED | OUTPATIENT
Start: 2023-04-21 | End: 2023-04-22 | Stop reason: HOSPADM

## 2023-04-21 RX ORDER — ONDANSETRON 2 MG/ML
8 INJECTION INTRAMUSCULAR; INTRAVENOUS EVERY 8 HOURS PRN
Status: CANCELLED | OUTPATIENT
Start: 2023-05-02

## 2023-04-21 RX ORDER — LIDOCAINE AND PRILOCAINE 25; 25 MG/G; MG/G
CREAM TOPICAL PRN
Status: CANCELLED | OUTPATIENT
Start: 2023-05-02

## 2023-04-21 RX ORDER — LORAZEPAM 2 MG/ML
1 CONCENTRATE ORAL EVERY 6 HOURS PRN
Status: CANCELLED | OUTPATIENT
Start: 2023-05-02

## 2023-04-21 RX ADMIN — HEPARIN 500 UNITS: 100 SYRINGE at 11:22

## 2023-04-21 RX ADMIN — LIDOCAINE AND PRILOCAINE 1 APPLICATION: 25; 25 CREAM TOPICAL at 10:38

## 2023-04-21 ASSESSMENT — FIBROSIS 4 INDEX: FIB4 SCORE: 2.29

## 2023-04-21 NOTE — PROGRESS NOTES
"Pharmacy Chemotherapy Verification  **CHEMOTHERAPY DEFERRED DUE TO LOW PLATELETS**    Patient Name: Tomas Jean-Baptiste   Dx: pH+ B-Cell ALL         Protocol: Capizzi MTX IM (On Study Arm B, ID number: [id number])         Allergies:  Amoxicillin     /87   Pulse (!) 112   Temp 36.9 °C (98.5 °F) (Temporal)   Resp 20   Ht 1.654 m (5' 5.12\")   Wt 56.7 kg (125 lb)   SpO2 100%   BMI 20.73 kg/m²    Body surface area is 1.61 meters squared.  Weight Type Height Weight BSA Additional Details   Treatment plan 165.1 cm 64.9 kg 1.73 m² Documented as of 2/27/2023      Labs 4/21/23   Hgb 8.6 Plt 18k  SCr 0.42 CrCl >125 mL/min   AST/ALT/AP = 37/48/116 Tbili = 0.4    Drug Order   (Drug name, dose, route, IV Fluid & volume, frequency, number of doses) Cycle: IM D31  Previous treatment: IM D22 4/12/23       By my signature below, I confirm this process was performed independently with the BSA and all final chemotherapy dosing calculations congruent. I have reviewed the above chemotherapy order and that my calculation of the final dose and BSA (when applicable) corroborate those calculations of the  pharmacist. Discrepancies of 10% or greater in the written dose have been addressed and documented within the [facility] Progress notes.    Ibeth Paulino, PharmD, BCOP              "

## 2023-04-21 NOTE — PROGRESS NOTES
Pt to [facility] for lab draw, doctors office visit, and chemotherapy administration.      Afebrile.  VSS.  Awake and alert in no acute distress.      Port accessed using a 22g 3/4 inch trevino needle with 1 attempt.  Labs drawn from the port without difficulty.   Pt tolerated well.      Pt did not meet parameters today. Pt delayed to 4/25/23 per Dr. Rodriguez.    Penny from Lab called with critical result of WBC 1.4, platelets 18 at 1056. Critical lab result read back to Penny.   Dr. Rodriguez notified of critical lab result at 1057.  Critical lab result read back by Dr. Rodriguez.    Visit with Dr. Rodriguez completed. No further orders.  Port flushed per orders (see MAR) and port de-accessed.  Plan to follow up 4/25/23 for chemotherapy.

## 2023-04-21 NOTE — PROGRESS NOTES
"Pediatric Hematology / Oncology  Progress Note      Patient Name:  Tomas Jean-Baptiste  : 2000   MRN: [id number]    Location of Service:  [facility]  Date of Service: 2023  Time: 10:33 AM    Primary Care Physician: Margarito Arvizu M.D.    Protocol/Treatment Plan:    HISTORY OF PRESENT ILLNESS:     Chief Complaint: Here for chemotherapy (count dependent)    History of Present Illness: Tomas Roy \"JULY\"Estiven is a 22 y.o. male who presents to the [facility] for scheduled chemotherapy.  Today is Day 31 of Interim Maintenance 2 treatment as per Arm B of protocol [id number] Ph+ Acute B-Lymphoblastic Leukemia.     JULY was hospitalized from  to  because of vomiting, cough and chills.  He did not have fever.  Over the 3 days of admission, his symptoms gradually improved.  He did receive a transfusion of pRBCs, which likely helped to speed his recovery.    Since jose home 4 days, ago, JULY reports that he has generally done well.  He resumed taking olanzapine upon discharge and he credits this for alleviating his nausea, helping him sleep better, and generally feeling better.  He reports decreased cough/phlegm, as well.    Review of Systems:     Constitutional: Afebrile. Better appetite and energy.  HENT: Negative for URI symptoms, mouth sores.  Eyes: Negative for visual changes.  Respiratory: Negative for shortness of breath or chest pain.    Musculoskeletal: Slowly regaining strength/mobility at left shoulder.    Skin: Negative for rash, signs of infection.  Neurological: Negative for focal weakness or headaches; numbness/tingling in toes is no better.    Endo/Heme/Allergies: No bruising/bleeding noted.    Psychiatric/Behavioral: No changes in mood, appropriate for age.     PAST MEDICAL HISTORY:     Past Medical History:    Past Medical History:   Diagnosis Date    Cancer (HCC)     Leukoma        Past " Surgical History:     Past Surgical History:   Procedure Laterality Date    INCISION AND DRAINAGE GENERAL Left 2/17/2023    Procedure: INCISION AND DRAINAGE OF LEFT SHOULDER;  Surgeon: Luke Haddad M.D.;  Location: SURGERY [facility];  Service: General    CATH PLACEMENT Right 8/29/2022    Procedure: INSERTION, CATHETER;  Surgeon: Simona Moise M.D.;  Location: SURGERY [facility];  Service: Ent        Allergies:   Allergies as of 04/21/2023 - Reviewed 04/21/2023   Allergen Reaction Noted    Amoxicillin Rash 04/03/2020       Home Medications:    Current Outpatient Medications   Medication Sig Dispense Refill    apixaban (ELIQUIS) 2.5mg Tab Take 2.5 mg by mouth 2 times a day.  (HELD last night and this morning)      famotidine (PEPCID) 20 MG Tab Take 1 Tablet by mouth 2 times a day. 60 Tablet 1    OLANZapine (ZYPREXA) 5 MG Tab Take 1 Tablet by mouth every evening. 30 Tablet 1    Melatonin 5 MG Cap Take 5 mg by mouth at bedtime as needed.      imatinib (GLEEVEC) 400 MG tablet Take 1 Tablet by mouth every day. Pt takes 200 mg + 400 mg for a total dose of 600 mg daily 30 Tablet 5    imatinib (GLEEVEC) 100 MG tablet Take 2 Tablets by mouth every day. Pt takes 200 mg + 400 mg for a total dose of 600 mg daily 60 Tablet 5    Omega-3 Fatty Acids (FISH OIL PO) Take 1 Capsule by mouth every day.      ondansetron (ZOFRAN ODT) 8 MG TABLET DISPERSIBLE Dissolve 1 Tablet by mouth every 6 hours as needed for Nausea/Vomiting. 10 Tablet 0    sulfamethoxazole-trimethoprim (BACTRIM DS) 800-160 MG tablet Take 2 Tablets by mouth in the morning every Sat and Sun.      vitamin D3 (CHOLECALCIFEROL) 1000 Unit (25 mcg) Tab Take 1 Tablet by mouth every day.      therapeutic multivitamin-minerals (THERAGRAN-M) Tab Take 1 Tablet by mouth every day.       Current Facility-Administered Medications   Medication Dose Route Frequency Provider Last Rate Last Admin    methotrexate PF 12 mg in syringe qs with pf NS 6 mL Intrathecal Chemotherapy  "(PEDS ONC)  12 mg Intrathecal Once River Rodriguez M.D.            Social History: Planning to re-start school in the [place].    Family History:     Family History   Problem Relation Age of Onset    No Known Problems Mother     Stroke Maternal Grandmother     Diabetes Paternal Grandfather     Hypertension Paternal Grandfather     Hyperlipidemia Paternal Grandfather     Cancer Neg Hx     Heart Disease Neg Hx          OBJECTIVE:     Vitals:   /87   Pulse (!) 112   Temp 36.9 °C (98.5 °F) (Temporal)   Resp 20   Ht 1.654 m (5' 5.12\")   Wt 56.7 kg (125 lb)   SpO2 100%     Labs:   Latest Reference Range & Units 04/17/23 00:52 04/21/23 10:10   WBC 4.8 - 10.8 K/uL 1.5 (LL) 1.4 (LL)   RBC 4.70 - 6.10 M/uL 2.92 (L) 2.80 (L)   Hemoglobin 14.0 - 18.0 g/dL 9.1 (L) 8.6 (L)   Hematocrit 42.0 - 52.0 % 26.9 (L) 27.1 (L)   MCV 81.4 - 97.8 fL 92.1 96.8   MCH 27.0 - 33.0 pg 31.2 30.7   MCHC 33.7 - 35.3 g/dL 33.8 31.7 (L)   RDW 35.9 - 50.0 fL 69.1 (H) 74.3 (H)   Platelet Count 164 - 446 K/uL 44 (L) 18 (LL)     Physical Exam:    Constitutional: Well-developed, well-nourished, and in no distress.  Somewhat pale.  HENT: Normocephalic and atraumatic. No nasal congestion or rhinorrhea. Oropharynx is clear and moist.     Eyes: Conjunctivae are normal. Pupils are equal, round, and reactive to light. No scleral icterus.    Neck: Normal range of motion of neck, no adenopathy.    Cardiovascular: Normal rate, regular rhythm and normal heart sounds.  No murmur heard. Distal cap refill < 2 sec  Pulmonary/Chest: Effort normal and breath sounds normal.  Symmetric expansion.    Abdomen: Soft. Bowel sounds are normal. No distension and no mass. There is no hepatosplenomegaly.    Musculoskeletal: Limited ROM at left shoulder.  Skin: No rash or evidence of skin infection.    Psychiatric: Mood and affect normal for age.      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  Recent admission for N/V, chills but those symptoms have resolved.  Olanzapine seems to be having an overall positive effect since discharge.      Relevant Medications    heparin lock flush 100 unit/mL injection 500 Units    Encounter for chemotherapy management      Per protocol, today's scheduled chemotherapy will be held due to platelets less than 50 K.  JULY will continue oral imatinib; although this medication may be contributing to his myelosuppression, the protocol does not dictate holding it, for now.      After some consideration, I recommend continuing oral apixaban due to prior history of thromboembolism.  I advised JULY to hold the medication if he has any symptoms of bruising or bleeding, given his thrombocytopenia.      Relevant Medications    lidocaine-prilocaine (EMLA) 2.5-2.5 % cream    methotrexate PF 12 mg in syringe qs with pf NS 6 mL Intrathecal Chemotherapy (PEDS ONC)    Other Relevant Orders    CBC WITH DIFFERENTIAL (Completed)    CMP (Completed)    BILIRUBIN DIRECT (Completed)     Other Visit Diagnoses       Neuropathy due to chemotherapeutic drug (HCC)          This became an issue after a long course of Flagyl as treatment for Bacteroides.  That said, ongoing treatment with vincristine is probably a contributing factor.  Medical therapy for drug-induced peripheral neuropathy is not particularly effective but I did advise JULY that he should consider high-dose B vitamin complex administration, which has some efficacy, at least in patients with diabetic neuropathy.            Research Participant:           [facility] - Source Data         Diagnosis: Ph+ Precursor B-Cell Acute Lymphoblastic Leukemia     Disease Status: EOI1A MRD NEGATIVE, EOI1B RD NEGATIVE, CNS3c, testicular negative, HSV1 IgG POSITIVE, CMV IgG NEGATIVE, VARICELLA IgG POSITIVE     Active Studies: [id number], [id number]                                                                                                       Inactive Studies: [id number]                                                                                                                                                Optional Studies: None             Protocol: International Phase 3 trial in [place] 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      Treatment Plan: [id number](OS), AR-Arm B, Interim Maintenance, Day 31     Height: 1.650 m      Weight: 64.9kg       BSA: 1.73 m²   (Interim Maintenance, Day 1 2/27/2023)                                                                                                                                           Performance Status: Karnofsky 70, ECOG  3 (3/9/2023)     Treatment Plan Medications:       Imatinib HELD 3/30/2023     Evaluations / Study Labs:  (4/21/2023)      WBC 1.4, Hgb 8.6 platelets 18  , , absolute monocyte count 230     Therapy Given: (4/21/2023)     Day 31 therapy HELD due to thrombocytopenia          Disposition: Return to clinic on 4/25/2023 for delayed Day 21 of Interim Maintenance        ANN MARIE Rodriguez MD  Pediatric Hematology / Oncology  [facility]  Cell.  [phone]  [facility]. [phone]

## 2023-04-22 NOTE — DOCUMENTATION QUERY
[facility]                                                                       Query Response Note      PATIENT:               REBECA CHEEMA  ACCT #:                  [id number]  MRN:                     [id number]  :                      2000  ADMIT DATE:       2023 3:49 PM  DISCH DATE:        2023 3:22 PM  RESPONDING  PROVIDER #:        [id number]           QUERY TEXT:    Malnutrition is documented in the Medical Record.  Please specify the severity.    The patient's Clinical Indicators include:  Findings: 4/15 Dietary: BMI 19.85  down 45 lbs (27%) in <1 year. This is significant weight loss. Pt thin appearing Pt with severe malnutrition in the context of a chronic illness related to ALL and chemotherapy as evidenced by severe weight loss of 45 lbs (27%) in <1 year (~11 months) and decreased oral intake (suspected <75% of normal intake for  >/= 1 month).    Treatment: 4/15 Dietary: Dietary consult    Risk Factors: 4/15 Dietary: Pt with ALL, most recent chemotherapy 2023.    Stephanie Garcia RN BSN  Clinical Documentation   [email or web address]  Connect via [email or web address] Messenger  Options provided:   -- Severe protein calorie malnutrition   -- Mild protein calorie malnutrition   -- Moderate protein calorie malnutrition   -- Other explanation, (please specify other explanation)   -- Unable to determine      Query created by: Stephanie Oliver on 2023 8:53 AM    RESPONSE TEXT:    Mild protein calorie malnutrition          Electronically signed by:  TAE ARCINIEGA MD 2023 5:07 PM

## 2023-04-25 ENCOUNTER — HOSPITAL ENCOUNTER (OUTPATIENT)
Dept: INFUSION CENTER | Facility: MEDICAL CENTER | Age: 23
End: 2023-04-25
Attending: PEDIATRICS
Payer: COMMERCIAL

## 2023-04-25 VITALS
TEMPERATURE: 98.1 F | DIASTOLIC BLOOD PRESSURE: 70 MMHG | BODY MASS INDEX: 21.41 KG/M2 | OXYGEN SATURATION: 100 % | HEART RATE: 98 BPM | WEIGHT: 128.53 LBS | SYSTOLIC BLOOD PRESSURE: 113 MMHG | RESPIRATION RATE: 20 BRPM | HEIGHT: 65 IN

## 2023-04-25 DIAGNOSIS — C91.01 ACUTE LYMPHOBLASTIC LEUKEMIA (ALL) IN REMISSION (HCC): ICD-10-CM

## 2023-04-25 DIAGNOSIS — C91.Z0 B LYMPHOBLASTIC LEUKEMIA WITH T(9;22)(Q34;Q11.2);BCR-ABL1 (HCC): ICD-10-CM

## 2023-04-25 DIAGNOSIS — Z51.11 ENCOUNTER FOR CHEMOTHERAPY MANAGEMENT: ICD-10-CM

## 2023-04-25 LAB
ALBUMIN SERPL BCP-MCNC: 3 G/DL (ref 3.2–4.9)
ALBUMIN/GLOB SERPL: 1.5 G/DL
ALP SERPL-CCNC: 115 U/L (ref 30–99)
ALT SERPL-CCNC: 63 U/L (ref 2–50)
ANION GAP SERPL CALC-SCNC: 8 MMOL/L (ref 7–16)
ANISOCYTOSIS BLD QL SMEAR: ABNORMAL
AST SERPL-CCNC: 51 U/L (ref 12–45)
BASOPHILS # BLD AUTO: 0.9 % (ref 0–1.8)
BASOPHILS # BLD: 0.01 K/UL (ref 0–0.12)
BILIRUB CONJ SERPL-MCNC: <0.2 MG/DL (ref 0.1–0.5)
BILIRUB INDIRECT SERPL-MCNC: NORMAL MG/DL (ref 0–1)
BILIRUB SERPL-MCNC: 0.3 MG/DL (ref 0.1–1.5)
BUN SERPL-MCNC: 16 MG/DL (ref 8–22)
CALCIUM ALBUM COR SERPL-MCNC: 8.7 MG/DL (ref 8.5–10.5)
CALCIUM SERPL-MCNC: 7.9 MG/DL (ref 8.5–10.5)
CHLORIDE SERPL-SCNC: 107 MMOL/L (ref 96–112)
CO2 SERPL-SCNC: 21 MMOL/L (ref 20–33)
CREAT SERPL-MCNC: 0.44 MG/DL (ref 0.5–1.4)
EOSINOPHIL # BLD AUTO: 0 K/UL (ref 0–0.51)
EOSINOPHIL NFR BLD: 0 % (ref 0–6.9)
ERYTHROCYTE [DISTWIDTH] IN BLOOD BY AUTOMATED COUNT: 75.6 FL (ref 35.9–50)
GFR SERPLBLD CREATININE-BSD FMLA CKD-EPI: 154 ML/MIN/1.73 M 2
GLOBULIN SER CALC-MCNC: 2 G/DL (ref 1.9–3.5)
GLUCOSE SERPL-MCNC: 90 MG/DL (ref 65–99)
HCT VFR BLD AUTO: 25.6 % (ref 42–52)
HGB BLD-MCNC: 8.1 G/DL (ref 14–18)
LYMPHOCYTES # BLD AUTO: 0.5 K/UL (ref 1–4.8)
LYMPHOCYTES NFR BLD: 49.5 % (ref 22–41)
MACROCYTES BLD QL SMEAR: ABNORMAL
MANUAL DIFF BLD: NORMAL
MCH RBC QN AUTO: 31.2 PG (ref 27–33)
MCHC RBC AUTO-ENTMCNC: 31.6 G/DL (ref 33.7–35.3)
MCV RBC AUTO: 98.5 FL (ref 81.4–97.8)
MONOCYTES # BLD AUTO: 0.07 K/UL (ref 0–0.85)
MONOCYTES NFR BLD AUTO: 6.7 % (ref 0–13.4)
MORPHOLOGY BLD-IMP: NORMAL
NEUTROPHILS # BLD AUTO: 0.43 K/UL (ref 1.82–7.42)
NEUTROPHILS NFR BLD: 42.9 % (ref 44–72)
NRBC # BLD AUTO: 0 K/UL
NRBC BLD-RTO: 0 /100 WBC
PLATELET # BLD AUTO: 44 K/UL (ref 164–446)
PLATELET BLD QL SMEAR: NORMAL
PMV BLD AUTO: 11.9 FL (ref 9–12.9)
POTASSIUM SERPL-SCNC: 4.5 MMOL/L (ref 3.6–5.5)
PROT SERPL-MCNC: 5 G/DL (ref 6–8.2)
RBC # BLD AUTO: 2.6 M/UL (ref 4.7–6.1)
RBC BLD AUTO: PRESENT
SODIUM SERPL-SCNC: 136 MMOL/L (ref 135–145)
WBC # BLD AUTO: 1 K/UL (ref 4.8–10.8)

## 2023-04-25 PROCEDURE — 85025 COMPLETE CBC W/AUTO DIFF WBC: CPT

## 2023-04-25 PROCEDURE — 80053 COMPREHEN METABOLIC PANEL: CPT

## 2023-04-25 PROCEDURE — 700111 HCHG RX REV CODE 636 W/ 250 OVERRIDE (IP): Mod: UD | Performed by: PEDIATRICS

## 2023-04-25 PROCEDURE — 85007 BL SMEAR W/DIFF WBC COUNT: CPT

## 2023-04-25 PROCEDURE — 99214 OFFICE O/P EST MOD 30 MIN: CPT | Performed by: PEDIATRICS

## 2023-04-25 PROCEDURE — 36591 DRAW BLOOD OFF VENOUS DEVICE: CPT

## 2023-04-25 PROCEDURE — 82248 BILIRUBIN DIRECT: CPT

## 2023-04-25 PROCEDURE — A4212 NON CORING NEEDLE OR STYLET: HCPCS

## 2023-04-25 RX ORDER — HEPARIN SODIUM (PORCINE) LOCK FLUSH IV SOLN 100 UNIT/ML 100 UNIT/ML
500 SOLUTION INTRAVENOUS PRN
Status: CANCELLED | OUTPATIENT
Start: 2023-04-25

## 2023-04-25 RX ORDER — HEPARIN SODIUM,PORCINE 10 UNIT/ML
30 VIAL (ML) INTRAVENOUS PRN
Status: CANCELLED | OUTPATIENT
Start: 2023-04-25

## 2023-04-25 RX ORDER — 0.9 % SODIUM CHLORIDE 0.9 %
20 VIAL (ML) INJECTION PRN
Status: CANCELLED | OUTPATIENT
Start: 2023-04-25

## 2023-04-25 RX ADMIN — HEPARIN 500 UNITS: 100 SYRINGE at 11:38

## 2023-04-25 ASSESSMENT — FIBROSIS 4 INDEX: FIB4 SCORE: 6.23

## 2023-04-25 NOTE — PROGRESS NOTES
Pt to [facility] for lab draw, doctors office visit, LP with IT chemo and chemotherapy administration.      Afebrile.  VSS.  Awake and alert in no acute distress.      Port accessed using a 22g 3/4 inch trevino needle with 1 attempt.  Labs drawn from the port without difficulty.   Pt tolerated well.      Office visit completed with Dr Faye. Treatment deferred d/t PLT 44.      Port flushed per orders (see MAR) and de-accessed after completion. PT home with mom.  Will return for labs on 04/27/203

## 2023-04-26 LAB — MISCELLANEOUS LAB RESULT MISCLAB: NORMAL

## 2023-04-27 ENCOUNTER — HOSPITAL ENCOUNTER (OUTPATIENT)
Dept: INFUSION CENTER | Facility: MEDICAL CENTER | Age: 23
End: 2023-04-27
Attending: PEDIATRICS
Payer: COMMERCIAL

## 2023-04-27 DIAGNOSIS — Z51.11 ENCOUNTER FOR CHEMOTHERAPY MANAGEMENT: ICD-10-CM

## 2023-04-27 DIAGNOSIS — C91.01 ACUTE LYMPHOBLASTIC LEUKEMIA (ALL) IN REMISSION (HCC): ICD-10-CM

## 2023-04-27 DIAGNOSIS — C91.Z0 B LYMPHOBLASTIC LEUKEMIA WITH T(9;22)(Q34;Q11.2);BCR-ABL1 (HCC): ICD-10-CM

## 2023-04-27 LAB
ALBUMIN SERPL BCP-MCNC: 3.1 G/DL (ref 3.2–4.9)
ALBUMIN/GLOB SERPL: 1.5 G/DL
ALP SERPL-CCNC: 123 U/L (ref 30–99)
ALT SERPL-CCNC: 52 U/L (ref 2–50)
ANION GAP SERPL CALC-SCNC: 11 MMOL/L (ref 7–16)
ANISOCYTOSIS BLD QL SMEAR: ABNORMAL
AST SERPL-CCNC: 42 U/L (ref 12–45)
BASOPHILS # BLD AUTO: 0 % (ref 0–1.8)
BASOPHILS # BLD: 0 K/UL (ref 0–0.12)
BILIRUB CONJ SERPL-MCNC: <0.2 MG/DL (ref 0.1–0.5)
BILIRUB INDIRECT SERPL-MCNC: NORMAL MG/DL (ref 0–1)
BILIRUB SERPL-MCNC: 0.2 MG/DL (ref 0.1–1.5)
BUN SERPL-MCNC: 19 MG/DL (ref 8–22)
CALCIUM ALBUM COR SERPL-MCNC: 8.7 MG/DL (ref 8.5–10.5)
CALCIUM SERPL-MCNC: 8 MG/DL (ref 8.5–10.5)
CHLORIDE SERPL-SCNC: 106 MMOL/L (ref 96–112)
CO2 SERPL-SCNC: 20 MMOL/L (ref 20–33)
CREAT SERPL-MCNC: 0.5 MG/DL (ref 0.5–1.4)
EOSINOPHIL # BLD AUTO: 0 K/UL (ref 0–0.51)
EOSINOPHIL NFR BLD: 0 % (ref 0–6.9)
ERYTHROCYTE [DISTWIDTH] IN BLOOD BY AUTOMATED COUNT: 77.1 FL (ref 35.9–50)
GFR SERPLBLD CREATININE-BSD FMLA CKD-EPI: 148 ML/MIN/1.73 M 2
GLOBULIN SER CALC-MCNC: 2.1 G/DL (ref 1.9–3.5)
GLUCOSE SERPL-MCNC: 94 MG/DL (ref 65–99)
HCT VFR BLD AUTO: 24.4 % (ref 42–52)
HGB BLD-MCNC: 7.7 G/DL (ref 14–18)
LG PLATELETS BLD QL SMEAR: NORMAL
LYMPHOCYTES # BLD AUTO: 0.56 K/UL (ref 1–4.8)
LYMPHOCYTES NFR BLD: 46.5 % (ref 22–41)
MACROCYTES BLD QL SMEAR: ABNORMAL
MANUAL DIFF BLD: NORMAL
MCH RBC QN AUTO: 31.4 PG (ref 27–33)
MCHC RBC AUTO-ENTMCNC: 31.6 G/DL (ref 33.7–35.3)
MCV RBC AUTO: 99.6 FL (ref 81.4–97.8)
MONOCYTES # BLD AUTO: 0.25 K/UL (ref 0–0.85)
MONOCYTES NFR BLD AUTO: 21 % (ref 0–13.4)
MORPHOLOGY BLD-IMP: NORMAL
NEUTROPHILS # BLD AUTO: 0.39 K/UL (ref 1.82–7.42)
NEUTROPHILS NFR BLD: 32.5 % (ref 44–72)
NRBC # BLD AUTO: 0 K/UL
NRBC BLD-RTO: 0 /100 WBC
PLATELET # BLD AUTO: 66 K/UL (ref 164–446)
PLATELET BLD QL SMEAR: NORMAL
PMV BLD AUTO: 11.6 FL (ref 9–12.9)
POLYCHROMASIA BLD QL SMEAR: NORMAL
POTASSIUM SERPL-SCNC: 4.1 MMOL/L (ref 3.6–5.5)
PROT SERPL-MCNC: 5.2 G/DL (ref 6–8.2)
RBC # BLD AUTO: 2.45 M/UL (ref 4.7–6.1)
RBC BLD AUTO: PRESENT
SODIUM SERPL-SCNC: 137 MMOL/L (ref 135–145)
WBC # BLD AUTO: 1.2 K/UL (ref 4.8–10.8)

## 2023-04-27 PROCEDURE — 36591 DRAW BLOOD OFF VENOUS DEVICE: CPT

## 2023-04-27 PROCEDURE — 82657 ENZYME CELL ACTIVITY: CPT | Mod: 91

## 2023-04-27 PROCEDURE — 80053 COMPREHEN METABOLIC PANEL: CPT

## 2023-04-27 PROCEDURE — 85025 COMPLETE CBC W/AUTO DIFF WBC: CPT

## 2023-04-27 PROCEDURE — 85007 BL SMEAR W/DIFF WBC COUNT: CPT

## 2023-04-27 PROCEDURE — A4212 NON CORING NEEDLE OR STYLET: HCPCS

## 2023-04-27 PROCEDURE — 700111 HCHG RX REV CODE 636 W/ 250 OVERRIDE (IP): Mod: UD | Performed by: PEDIATRICS

## 2023-04-27 PROCEDURE — 82248 BILIRUBIN DIRECT: CPT

## 2023-04-27 RX ORDER — 0.9 % SODIUM CHLORIDE 0.9 %
20 VIAL (ML) INJECTION PRN
Status: CANCELLED | OUTPATIENT
Start: 2023-04-27

## 2023-04-27 RX ORDER — HEPARIN SODIUM (PORCINE) LOCK FLUSH IV SOLN 100 UNIT/ML 100 UNIT/ML
500 SOLUTION INTRAVENOUS PRN
Status: CANCELLED | OUTPATIENT
Start: 2023-04-27

## 2023-04-27 RX ORDER — HEPARIN SODIUM,PORCINE 10 UNIT/ML
30 VIAL (ML) INTRAVENOUS PRN
Status: CANCELLED | OUTPATIENT
Start: 2023-04-27

## 2023-04-27 RX ADMIN — HEPARIN 500 UNITS: 100 SYRINGE at 15:29

## 2023-04-27 NOTE — PROGRESS NOTES
Pt to [facility] for lab draw. Awake and alert in no acute distress.  Port accessed with 22 g 3/4in   and labs drawn from the port without difficulty / with 1 attempt.   Pt tolerated well. Port flushed per orders (see MAR) and port de-accessed.  Plan to follow up Dr. Faye for lab results and plan of care. If patient meets count parameters, will return tomorrow 4/28/23 for chemotherapy.

## 2023-04-27 NOTE — PROGRESS NOTES
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 sensory changes, weakness or headaches.    Endo/Heme/Allergies: Does not bruise/bleed easily.    Psychiatric/Behavioral: No changes in mood, appropriate for age.     PAST MEDICAL HISTORY:     Oncologic History:  2-3 week history of worsening fatigue, right lower extremity pain  Presentation to OSH and diagnosed with right LE superficial thrombus, subsegmental PE and hyperleukocytosis, anemia and thrombocytopenia  Transferred to [facility] for definitive care  Presenting (local) WBC > 440K, Hgb 10.0, platelets 53, (automated differential ANC 3190, ALC 75,310, absolute monocyte count 79191, absolute blast count 340,560)  Uric Acid 15.6, phosphorus 5.6, LDH 1114  Rasburicase x 1 dose given   Peripheral Blood flow cytometry demonstrating CD10 pos, CD19 pos, CD20 neg, CD22 dim (60%) 5/28/2022  Peripheral blood FISH for BCR-ABL1 positive in 98% of analyzed cells     Age at  Diagnosis: 21 years  White Blood Cell Count at Presentation: > 440 k/uL  Testicular Disease Status: Negative (see procedure note 5/30/2022)  CNS Status: 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, diagnostic LP with WBC 1, RBC 3 and no evidence of leukemic blasts 5/30/2022  Steroid Pre-treatment: None  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     All inclusion/exclusion criteria for [id number] met and consent signed - enrolled 5/29/2022   All inclusion/exclusion criteria for [id number] met and consent signed - enrolled 5/30/2022  Confirmatory bone marrow aspirate and biopsy and diagnostic LP + cytarabine 70 mg IT 5/30/2022  Induction therapy (ON STUDY [id number]) started 5/30/2022  Bone marrow immunohistochemistry consistent with diagnosis of B-ALL comprising 90% of marrow cellularity  Bone marrow sample [facility] to [facility] purposes:  Flow cytom  Of the blood pressure little high that is a problem is a cultural problem is well and cultural genetic and everything else like that unfortunately breathalyzers such bad heart disease diabetes things like that  populations etry consistent with peripheral blood, cytogenetics remarkable for known t(9;22)  CSF with WBC 1, RBC 3, no blasts identified on cytospin  FISH results available 5/31/2022 making patient eligible for transfer from [place] [address] to [place] [address] as eligibility requirements were met for [place] [address]  Patient unenrolled from [place] [address] (BCR-ABL1 fusion positive) 6/1/2022  Consent signed for [place] [address] and patient enrolled 6/1/2022 (see eligibility checklist from 5/31/2022 and consent note from 6/1/2022)  Imatinib 400 mg PO QAM / 200 mg PO QPM started 6/3/2022 (allowed per [place] [address])  Patient completed the first 15 days of a Standard 4-drug Induction on 6/13/2022  Start of [id number](OS), Induction IA Part 2, Day 15 6/13/2022  End of Induction 1A Part 2 - MRD negative  Start of [facility]  [id number](OS), Induction IA Part 2, Day 15 7/5/2022  Induction IB DELAYED 2 weeks 14 days from 7/26/2022-8/9/2022) for myelosuppression - Start of Day 22 cytarabine block 8/9/2022  Induction IB Day 42 delayed 9 days for myelosuppression - Day 42 evaluations 9/7/2022  End of Induction IB - Flow cytometric MRD negative, MRD by IgH-TCR PCR [id number]%  Randomization to AR-Experimental Arm B of [id number]  Start of AR-Experimental Arm B, Interim Maintenance 9/29/2022  Weight based increase in dose of imatinib to 400 mg PO AM and 250 mg PM 9/29/2022  Thrombocytopenia not permissive of proceeding with Day 15 of Interim Maintenance  AR-Experimental Arm B, Interim Maintenance, Day 15 delayed 4 days, start 10/17/2022  AR-Experimental Arm B, Interim Maintenance, Day 29, start 11/1/2022  AR-Experimental Arm B, Interim Maintenance, Day 43, start 11/14/2022  Last does of 6-MP 11/27/2022  AR-Experimental Arm B, Delayed Intensification, Day 1, start 12/6/2022  Admission with Severe Septic Shock 12/27/2022  Imatinib HELD 12/27/2022-1/8/2023  AR-Experimental Arm B, Delayed Intensification, Day 29 DELAYED 14 days with start 1/17/2023  Hospitalization 2/7/2023 on Day 50 of Delayed Intensification with left shoulder pain ultimately diagnosed with Bacteroides fragilis infection  AR-Experimental Arm B, Interim Maintenance, Day 1 DELAYED 7 days with start 2/27/2023  AR-Experimental Arm B, Interim Maintenance, Day 11 DELAYED 4 days for platelets 43K on 3/9/2023  AR-Experimental Arm B, Interim Maintenance, Day 11 VCR given and MTX omitted for platelets 43K 3/13/2023  Imatinib HELD 3/30/2023-4/11/2023  AR-Experimental Arm B, Interim Maintenance, Day 21 (DELAYED 22 DAYS) -administered 4/11/2023 with methotrexate 80 mg/m² IV  AR-Experimental Arm B, Interim Maintenance, Day 31 (DELAYED 4 DAYS)      Past Medical History:    1) Previously Healthy  2) 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  3) Hyperleukocytosis  4)  Hyperuricemia  5) Hyperphosphatemia  6) Right Lower Extremity Superficial Thrombus  7) Subsegmental Pulmonary Embolism  8) 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   Two dogs.  Everyone is well in the house. Father not involved.     Family History:     Family History             Family History   Problem Relation Age of Onset    No Known Problems Mother      Diabetes Paternal Grandfather      Hypertension Paternal Grandfather      Hyperlipidemia Paternal Grandfather      Cancer Neg Hx      Heart Disease Neg Hx      Stroke Neg Hx           No significant family history of cancer.  Both maternal and paternal family history of diabetes.     Immunizations:  UTD    Medications:   Current Outpatient Medications on File Prior to Encounter   Medication Sig Dispense Refill    apixaban (ELIQUIS) 2.5mg Tab Take 2.5 mg by mouth 2 times a day.      famotidine (PEPCID) 20 MG Tab Take 1 Tablet by mouth 2 times a day. 60 Tablet 1    OLANZapine (ZYPREXA) 5 MG Tab Take 1 Tablet by mouth every evening. 30 Tablet 1    Melatonin 5 MG Cap Take 5 mg by mouth at bedtime as needed.      imatinib (GLEEVEC) 400 MG tablet Take 1 Tablet by mouth every day. Pt takes 200 mg + 400 mg for a total dose of 600 mg daily 30 Tablet 5    imatinib (GLEEVEC) 100 MG tablet Take 2 Tablets by mouth every day. Pt takes 200 mg + 400 mg for a total dose of 600 mg daily 60 Tablet 5    Omega-3 Fatty Acids  "(FISH OIL PO) Take 1 Capsule by mouth every day.      ondansetron (ZOFRAN ODT) 8 MG TABLET DISPERSIBLE Dissolve 1 Tablet by mouth every 6 hours as needed for Nausea/Vomiting. 10 Tablet 0    sulfamethoxazole-trimethoprim (BACTRIM DS) 800-160 MG tablet Take 2 Tablets by mouth in the morning every Sat and Sun.      vitamin D3 (CHOLECALCIFEROL) 1000 Unit (25 mcg) Tab Take 1 Tablet by mouth every day.      therapeutic multivitamin-minerals (THERAGRAN-M) Tab Take 1 Tablet by mouth every day.       No current facility-administered medications on file prior to encounter.       OBJECTIVE:     Vitals:   /70   Pulse 98   Temp 36.7 °C (98.1 °F) (Temporal)   Resp 20   Ht 1.652 m (5' 5.04\")   Wt 58.3 kg (128 lb 8.5 oz)   SpO2 100%     Labs:  Hospital Outpatient Visit on 04/25/2023   Component Date Value    WBC 04/25/2023 1.0 (LL)     RBC 04/25/2023 2.60 (L)     Hemoglobin 04/25/2023 8.1 (L)     Hematocrit 04/25/2023 25.6 (L)     MCV 04/25/2023 98.5 (H)     MCH 04/25/2023 31.2     MCHC 04/25/2023 31.6 (L)     RDW 04/25/2023 75.6 (H)     Platelet Count 04/25/2023 44 (L)     MPV 04/25/2023 11.9     Neutrophils-Polys 04/25/2023 42.90 (L)     Lymphocytes 04/25/2023 49.50 (H)     Monocytes 04/25/2023 6.70     Eosinophils 04/25/2023 0.00     Basophils 04/25/2023 0.90     Nucleated RBC 04/25/2023 0.00     Neutrophils (Absolute) 04/25/2023 0.43 (LL)     Lymphs (Absolute) 04/25/2023 0.50 (L)     Monos (Absolute) 04/25/2023 0.07     Eos (Absolute) 04/25/2023 0.00     Baso (Absolute) 04/25/2023 0.01     NRBC (Absolute) 04/25/2023 0.00     Anisocytosis 04/25/2023 2+ (A)     Macrocytosis 04/25/2023 2+ (A)     Sodium 04/25/2023 136     Potassium 04/25/2023 4.5     Chloride 04/25/2023 107     Co2 04/25/2023 21     Anion Gap 04/25/2023 8.0     Glucose 04/25/2023 90     Bun 04/25/2023 16     Creatinine 04/25/2023 0.44 (L)     Calcium 04/25/2023 7.9 (L)     AST(SGOT) 04/25/2023 51 (H)     ALT(SGPT) 04/25/2023 63 (H)     Alkaline " Phosphatase 04/25/2023 115 (H)     Total Bilirubin 04/25/2023 0.3     Albumin 04/25/2023 3.0 (L)     Total Protein 04/25/2023 5.0 (L)     Globulin 04/25/2023 2.0     A-G Ratio 04/25/2023 1.5     Direct Bilirubin 04/25/2023 <0.2     Indirect Bilirubin 04/25/2023 see below     Correct Calcium 04/25/2023 8.7     GFR (CKD-EPI) 04/25/2023 154     Manual Diff Status 04/25/2023 PERFORMED     Peripheral Smear Review 04/25/2023 see below     Plt Estimation 04/25/2023 Marked Decrease     RBC Morphology 04/25/2023 Present      Physical Exam:    Constitutional: Well-developed, well-nourished, and in no distress.    HENT: Normocephalic and atraumatic. No nasal congestion or rhinorrhea. Oropharynx is clear and moist. No oral ulcerations or sores.    Eyes: Conjunctivae are normal. Pupils are equal, round, and reactive to light.    Neck: Normal range of motion of neck, no adenopathy.    Cardiovascular: Normal rate, regular rhythm and normal heart sounds.  No murmur heard. DP/radial pulses 2+, cap refill < 2 sec  Pulmonary/Chest: Effort normal and breath sounds normal. No respiratory distress. Symmetric expansion.  No crackles or wheezes.  Abdomen: Soft. Bowel sounds are normal. No distension and no mass. There is no hepatosplenomegaly.    Genitourinary:  Deferred.  Musculoskeletal: Normal range of motion of lower and upper extremities bilaterally. No tenderness to palpation of elbows, wriwts, hands, knees, ankles and feet bilaterally.   Lymphadenopathy: No cervical adenopathy, axillary adenopathy or inguinal adenopathy.   Neurological: Alert and oriented to person and place. Exhibits normal muscle tone bilaterally in upper and lower extremities. Gait normal. Coordination normal.    Skin: Skin is warm, dry and pink.  No rash or evidence of skin infection.  No pallor.   Psychiatric: Mood and affect normal for age.      ASSESSMENT AND PLAN:     Tomas Jean-Baptiste is a previously healthy 22 year old male with  Precursor  B-Cell Lymphoblastic Leukemia with BCR-ABL1 fusion and whose End of Induction IB MRD was both negative by flow cytometry and PCR who presents for Interim Maintenance, Day 31 with Capizzi MTX  and LP with IT MTX (DELAYED 4 DAYS)     2) Ph+ Precursor B-Cell Acute Lymphoblastic Leukemia, in MRD Remission:  - 2-3 weeks of symptoms  - Presenting WBC > 440 k/uL, hyperleukocytosis  - Start of Hydroxyurea (1500 mg PO Q8) 2320 on 5/27/2022  - discontinued after only 55 hours  - No steroid pretreatment  - 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  - Testicular status NEGATIVE       - Flow cytometry of both 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, FISH positive for BCR-ABL1 translocation  - Enrolled on [facility] [id number]  - Initially enrolled on [facility] [id number] - but 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                            - Enrolled on [facility] [id number] and began study 6/13/2022              - Started imatinib therapy 6/3/2022   - End of Induction IA and IB MRD negative  - Imatinib dose increased to 400 mg PO AM and 250 mg PM 9/29/2022  -* Note:  All imatinib doses given inpatient rounded down to 600 mg  - Imatinib held for Septic Shock 12/27/2022-1/8/2023  - Imatinib 600 mg PO daily restarted 1/8/2023 inpatient  - Imatinib 400 mg PO QAM and 250 mg PO QHS 1/14/2023-1/17/2023  - Imatinib 600 mg PO QAM 1/17/2023 - 3/30/2023                - WBC 1.0, Hgb 8.1, platelets 44    - , , abs mono 70               - ANC today has fallen since being seen 4 days ago from 910 to 430 (?), platelets improved however from 18 to 44K.  While not meeting with counts permissible to give full Day 31 therapy, counts are possibly improving (platelet rise).  Will give and additional 3 days of rest.  Will have back in clinic then and if meeting with counts will plan to give IV MTX, if not, will plan to give VCR / IT MTX only and omit MTX IV.     [id number], AR Arm B, Interim Maintenance, Day  31 (DELAYED 4 DAYS):      ** Continue imatinib 600 mg PO daily      ** HOLD Methotrexate 138.5 mg IV x 1 dose (Previous dose on Day 21 = 80 mg/m² which reflects a 20% dose reduction from the dose prior to that on Day 1)  ** HOLD Vincristine 2 mg IV x1  ** HOLD Methotrexate 12 mg IT     - Return to clinic 3 days for re-evaluation     2) Soft Tissue Infection / Joint Infection with Bacteroides fragilis:  - Edema and pain continue to improve  - Was initially on cefepime for F&N, added IV Vancomycin on 2/10/2023, discontinued both cefepime and vancomycin on 2/20/2023 after start of IV Flagyl (below)  - S/P open exploration/drainage 2/17/2023  - Culture: B.fragilis (both deep and superficial cultures)   - ID Consultation with Dr. Singh - recommendation for switch to metronidazole for 4+ weeks  - Flagyl DISCONTINUED 3/27/2023 after 5 weeks of therapy   - Neuropathies stable     3) Left Shoulder Pain (RESOLVED):     4) Chemotherapy Related Pancytopenia:  - WBC 1.0, Hgb 8.1, platelets 44  - ,   - Transfuse for hemoglobin less than 7.5 or symptomatic  - Transfuse for platelets less than 10,000 or symptomatic  - No transfusions necessary today     5) At Risk for DVT Secondary to PEG Asparaginase:  - Apixaban held in anticipation of possible LP  - Will hold again 4/27          6) Tachycardia (STABLE):   - Previous cardiac work-up to include echocardiogram as well as telemetry     7) History of Peptic Ulcer Disease/Bleeding:  - No longer taking Pepcid     8) Poor Appetite / Nausea (IMPROVED):  - Reports occasional nausea, improved appetite        - Addition of Zyprexa has made considerable improvement      9) At Risk of Opportunistic Lung Infection:  - Bactrim DS PO BID Sat and Sun for PJP prophylaxis     10) Central Access:   - R Port-A-Cath in place      Research Participant:             [facility] - Source Data         Diagnosis: Ph+ Precursor B-Cell Acute Lymphoblastic Leukemia     Disease  Status: EOI1A MRD NEGATIVE, EOI1B RD NEGATIVE, CNS3c, testicular negative, HSV1 IgG POSITIVE, CMV IgG NEGATIVE, VARICELLA IgG POSITIVE     Active Studies: [id number], [id number]                                                                                                      Inactive Studies: [id number]                                                                                                                                                Optional Studies: None             Protocol: International Phase 3 trial in [place] 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      Treatment Plan: [id number](OS), AR-Arm B, Interim Maintenance, Day 31 (DELAYED 4 DAYS)     Height: 1.650 m      Weight: 64.9kg       BSA: 1.73 m²   (Interim Maintenance, Day 1 2/27/2023)                                                                                                                                           Performance Status: Karnofsky 80, ECOG 2 (4/25/2023)     Treatment Plan Medications:  (100% adherent with imatinib)     Imatinib 600 mg QAM     Evaluations / Study Labs:      WBC 1.0, Hgb 8.1, platelets 44  ,      Therapy Given:      None - HELD DUE TO MYELOSUPPRESSION            Disposition: Return to clinic 4/27/2023 for labs and 4/28/2023 for therapy.    Pepe Faye MD  Pediatric Hematology / Oncology  [facility]  Cell.  [phone]  Office. [phone]

## 2023-04-27 NOTE — PROGRESS NOTES
Penny from Lab called with critical result of WBC 1.2 and ANC 0.25 at 1624. Critical lab result read back to Penny.   Dr. Faye notified of critical lab result at 1627.  Critical lab result read back by Dr. Faye.

## 2023-04-28 ENCOUNTER — HOSPITAL ENCOUNTER (OUTPATIENT)
Dept: INFUSION CENTER | Facility: MEDICAL CENTER | Age: 23
End: 2023-04-28
Attending: PEDIATRICS
Payer: COMMERCIAL

## 2023-04-28 VITALS
HEART RATE: 86 BPM | SYSTOLIC BLOOD PRESSURE: 93 MMHG | WEIGHT: 128.53 LBS | HEIGHT: 65 IN | BODY MASS INDEX: 21.41 KG/M2 | RESPIRATION RATE: 20 BRPM | DIASTOLIC BLOOD PRESSURE: 57 MMHG | TEMPERATURE: 98.3 F | OXYGEN SATURATION: 100 %

## 2023-04-28 DIAGNOSIS — C91.Z0 B LYMPHOBLASTIC LEUKEMIA WITH T(9;22)(Q34;Q11.2);BCR-ABL1 (HCC): ICD-10-CM

## 2023-04-28 DIAGNOSIS — Z51.11 ENCOUNTER FOR CHEMOTHERAPY MANAGEMENT: ICD-10-CM

## 2023-04-28 DIAGNOSIS — C91.01 ACUTE LYMPHOBLASTIC LEUKEMIA (ALL) IN REMISSION (HCC): ICD-10-CM

## 2023-04-28 LAB
BURR CELLS/RBC NFR CSF MANUAL: 0 %
CLARITY CSF: CLEAR
COLOR CSF: COLORLESS
COLOR SPUN CSF: COLORLESS
CSF COMMENTS 1658: NORMAL
CYTOLOGY REG CYTOL: NORMAL
LYMPHOCYTES NFR CSF: 82 %
MONONUC CELLS NFR CSF: 14 %
NEUTROPHILS NFR CSF: 4 %
RBC # CSF: 1 CELLS/UL
SPECIMEN VOL CSF: 2 ML
TUBE # CSF: 2
TUBE # CSF: 2
WBC # CSF: 2 CELLS/UL (ref 0–10)

## 2023-04-28 PROCEDURE — 700111 HCHG RX REV CODE 636 W/ 250 OVERRIDE (IP): Mod: UD | Performed by: PEDIATRICS

## 2023-04-28 PROCEDURE — 96450 CHEMOTHERAPY INTO CNS: CPT | Performed by: PEDIATRICS

## 2023-04-28 PROCEDURE — 700105 HCHG RX REV CODE 258: Mod: UD | Performed by: PEDIATRICS

## 2023-04-28 PROCEDURE — 96450 CHEMOTHERAPY INTO CNS: CPT

## 2023-04-28 PROCEDURE — 99214 OFFICE O/P EST MOD 30 MIN: CPT | Mod: 25 | Performed by: PEDIATRICS

## 2023-04-28 PROCEDURE — 700101 HCHG RX REV CODE 250: Mod: UD | Performed by: PEDIATRICS

## 2023-04-28 PROCEDURE — 96409 CHEMO IV PUSH SNGL DRUG: CPT

## 2023-04-28 PROCEDURE — 89051 BODY FLUID CELL COUNT: CPT

## 2023-04-28 PROCEDURE — 96375 TX/PRO/DX INJ NEW DRUG ADDON: CPT

## 2023-04-28 PROCEDURE — 88108 CYTOPATH CONCENTRATE TECH: CPT

## 2023-04-28 PROCEDURE — A4212 NON CORING NEEDLE OR STYLET: HCPCS

## 2023-04-28 RX ORDER — 0.9 % SODIUM CHLORIDE 0.9 %
20 VIAL (ML) INJECTION PRN
Status: CANCELLED | OUTPATIENT
Start: 2023-04-28

## 2023-04-28 RX ORDER — SODIUM CHLORIDE 9 MG/ML
500 INJECTION, SOLUTION INTRAVENOUS CONTINUOUS
Status: CANCELLED | OUTPATIENT
Start: 2023-04-28

## 2023-04-28 RX ORDER — HEPARIN SODIUM (PORCINE) LOCK FLUSH IV SOLN 100 UNIT/ML 100 UNIT/ML
500 SOLUTION INTRAVENOUS PRN
Status: CANCELLED | OUTPATIENT
Start: 2023-04-28

## 2023-04-28 RX ORDER — ONDANSETRON 2 MG/ML
8 INJECTION INTRAMUSCULAR; INTRAVENOUS ONCE
Status: CANCELLED | OUTPATIENT
Start: 2023-04-28

## 2023-04-28 RX ORDER — MIDAZOLAM HYDROCHLORIDE 1 MG/ML
2 INJECTION INTRAMUSCULAR; INTRAVENOUS ONCE
Status: CANCELLED
Start: 2023-04-28 | End: 2023-04-28

## 2023-04-28 RX ORDER — ONDANSETRON 2 MG/ML
8 INJECTION INTRAMUSCULAR; INTRAVENOUS ONCE
Status: COMPLETED | OUTPATIENT
Start: 2023-04-28 | End: 2023-04-28

## 2023-04-28 RX ORDER — HEPARIN SODIUM,PORCINE 10 UNIT/ML
30 VIAL (ML) INTRAVENOUS PRN
Status: CANCELLED | OUTPATIENT
Start: 2023-04-28

## 2023-04-28 RX ORDER — MIDAZOLAM HYDROCHLORIDE 1 MG/ML
2 INJECTION INTRAMUSCULAR; INTRAVENOUS ONCE
Status: COMPLETED | OUTPATIENT
Start: 2023-04-28 | End: 2023-04-28

## 2023-04-28 RX ORDER — LIDOCAINE AND PRILOCAINE 25; 25 MG/G; MG/G
CREAM TOPICAL PRN
Status: DISCONTINUED | OUTPATIENT
Start: 2023-04-28 | End: 2023-04-29 | Stop reason: HOSPADM

## 2023-04-28 RX ADMIN — VINCRISTINE SULFATE 2 MG: 1 INJECTION, SOLUTION INTRAVENOUS at 12:20

## 2023-04-28 RX ADMIN — HEPARIN 500 UNITS: 100 SYRINGE at 12:36

## 2023-04-28 RX ADMIN — LIDOCAINE AND PRILOCAINE 1 APPLICATION: 25; 25 CREAM TOPICAL at 10:13

## 2023-04-28 RX ADMIN — METHOTREXATE 12 MG: 25 SOLUTION INTRA-ARTERIAL; INTRAMUSCULAR; INTRATHECAL; INTRAVENOUS at 11:43

## 2023-04-28 RX ADMIN — ONDANSETRON 8 MG: 2 INJECTION INTRAMUSCULAR; INTRAVENOUS at 11:31

## 2023-04-28 RX ADMIN — MIDAZOLAM HYDROCHLORIDE 2 MG: 1 INJECTION, SOLUTION INTRAMUSCULAR; INTRAVENOUS at 11:30

## 2023-04-28 ASSESSMENT — FIBROSIS 4 INDEX: FIB4 SCORE: 1.85

## 2023-04-28 NOTE — PROGRESS NOTES
Pharmacy Chemotherapy Verification    Patient Name: Tomas Jean-Baptiste   Dx: pH+ B-Cell ALL         Protocol: Capizzi MTX IM (On Study Arm B, ID number: [id number])         Allergies:  Amoxicillin       /68   Pulse (!) 108   Temp 37.2 °C (98.9 °F) (Temporal)   Resp 20   Wt 58.3 kg (128 lb 8.5 oz)   SpO2 99%   BMI 21.36 kg/m²      4/11/23: Body surface area is 1.64 meters squared.    Weight Type Height Weight BSA Additional Details   Treatment plan 165.1 cm 64.9 kg 1.73 m² Documented as of 2/27/2023         Drug Order   (Drug name, dose, route, IV Fluid & volume, frequency, number of doses) Cycle: IM D31   Previous treatment: IM D22 on 4/12/23  Chemotherapy deferred 4/21/23 due to low plts    Medication = methotrexate PF  Base Dose= 12 mg fixed dose  Calc Dose: n/a  Final Dose = 12 mg  Route = INTRATHECAL  Fluid & Volume = pf NS 6 mL  Admin Duration = IT per MD   Day 1 and 31         No calculation required     Medication = Vincristine (ONCOVIN)   Base Dose= 1.5 mg/m2  Calc Dose: Base Dose x 1.64 m2 = 2.46 mg  Final Dose = 2 mg (MAX)   Route = IV  Fluid & Volume = NS 25 mL  Admin Duration = Over 5 - 10 minutes    Days 1,11,21,31,and 41       Treat with MAX dose    IV MTX on HOLD for low ANC/Plts   Medication = Pegaspargase (ONCASPAR)  Base Dose= 2,500 units/m2  Calc Dose: Base Dose x 1.62 m2 = 4,050 units  Final Dose = 4,323.25 units  Route = IV  Fluid & Volume =  mL  Admin Duration = Over 2 hours   Days 2 and 22       <10% difference, treat with final dose   By my signature below, I confirm this process was performed independently with the BSA and all final chemotherapy dosing calculations congruent. I have reviewed the above chemotherapy order and that my calculation of the final dose and BSA (when applicable) corroborate those calculations of the  pharmacist. Discrepancies of 10% or greater in the written dose have been addressed and documented within the EPIC Progress  notes.    Kayla Juarez, PharmD

## 2023-04-28 NOTE — PROGRESS NOTES
Pharmacy Chemotherapy Calculations Note:  No chemo given 4/21/23. Treatment deferred for low plts  Dx: B-ALL  Cycle:  Interim Maintenance I Day 31 (deferred due to counts)   Previous treatment: IM D22 on 4/12/23     Protocol: Interim Maintenance per Arm B of [id number] [facility] ID number: [id number]       4/11/23: Mtx dose reduction today, 80 mg/m2, per Dr Faye  4/27/23: Hold IV MTX per Dr. Faye       /68   Pulse (!) 108   Temp 37.2 °C (98.9 °F) (Temporal)   Resp 20   Wt 58.3 kg (128 lb 8.5 oz)   SpO2 99%   BMI 21.36 kg/m²  Body surface area is 1.64 meters squared.    Labs from 4/27/23 reviewed and ANC and plt low- per Dr. Faye proceed with IT MTX and vincristine, hold IV MTX       IT methotrexate 12 mg fixed dose for age >3 yo   No calc req'd, ok for final dose = 12 mg IT    Vincristine 1.5 mg/m² (max 2 mg) x 1.61 m² = 2.41 mg   Max 2 mg, ok for final dose = 2 mg IV      Cherrie Decker, PharmD, BCOP

## 2023-04-28 NOTE — PROGRESS NOTES
Pt to [facility] for Doctor visit, lumbar puncture with IT Chemotherapy and for IV Chemotherapy. Accompanied by self.  Afebrile.  VSS.  Awake and alert in no acute distress.  Port accessed with 22G 3/4 inch with 1 attempt. Pt tolerated well.      Visit with Dr. Faye completed. Okay to proceed with VCR only chemotherapy and IT chemo with LP today.    Labs reviewed and versed x1 given orders, see MAR.     Procedure performed by Dr. Faye.      Procedure Time out Time: 1139    LP completed at 1145.       Pt remained awake and LP completed without complications.     Chemotherapy dosage calculated independently by Dayna Damian, MONTSERRAT and Tammie Covington, MONTSERRAT and compared to road map for protocol COG [id number], Arm B.  Calculations within 10% of written order.     Chemotherapy given as ordered, see MAR.  Blood return verified prior to, during, and after chemotherapy infusion.  See Chemotherapy flowsheet.  Pt tolerated well.  No side effects or complications noted.     Pt remained supine for one hour post LP.  Port flushed per orders (see MAR) and de-accessed.  Will follow up on 5/5/23 for labs.

## 2023-05-02 ENCOUNTER — HOSPITAL ENCOUNTER (OUTPATIENT)
Dept: INFUSION CENTER | Facility: MEDICAL CENTER | Age: 23
End: 2023-05-02
Attending: PEDIATRICS
Payer: COMMERCIAL

## 2023-05-02 VITALS
TEMPERATURE: 98.4 F | DIASTOLIC BLOOD PRESSURE: 71 MMHG | SYSTOLIC BLOOD PRESSURE: 119 MMHG | BODY MASS INDEX: 21.34 KG/M2 | RESPIRATION RATE: 20 BRPM | WEIGHT: 128.09 LBS | HEART RATE: 125 BPM | OXYGEN SATURATION: 98 % | HEIGHT: 65 IN

## 2023-05-02 DIAGNOSIS — C91.Z0 B LYMPHOBLASTIC LEUKEMIA WITH T(9;22)(Q34;Q11.2);BCR-ABL1 (HCC): ICD-10-CM

## 2023-05-02 DIAGNOSIS — C91.01 ACUTE LYMPHOBLASTIC LEUKEMIA (ALL) IN REMISSION (HCC): ICD-10-CM

## 2023-05-02 DIAGNOSIS — Z51.11 ENCOUNTER FOR CHEMOTHERAPY MANAGEMENT: ICD-10-CM

## 2023-05-02 LAB
ALBUMIN SERPL BCP-MCNC: 3.2 G/DL (ref 3.2–4.9)
ALBUMIN/GLOB SERPL: 1.4 G/DL
ALP SERPL-CCNC: 132 U/L (ref 30–99)
ALT SERPL-CCNC: 76 U/L (ref 2–50)
ANION GAP SERPL CALC-SCNC: 10 MMOL/L (ref 7–16)
ANISOCYTOSIS BLD QL SMEAR: ABNORMAL
AST SERPL-CCNC: 48 U/L (ref 12–45)
BASOPHILS # BLD AUTO: 0 % (ref 0–1.8)
BASOPHILS # BLD: 0 K/UL (ref 0–0.12)
BILIRUB CONJ SERPL-MCNC: <0.2 MG/DL (ref 0.1–0.5)
BILIRUB INDIRECT SERPL-MCNC: NORMAL MG/DL (ref 0–1)
BILIRUB SERPL-MCNC: 0.6 MG/DL (ref 0.1–1.5)
BUN SERPL-MCNC: 14 MG/DL (ref 8–22)
CALCIUM ALBUM COR SERPL-MCNC: 8.9 MG/DL (ref 8.5–10.5)
CALCIUM SERPL-MCNC: 8.3 MG/DL (ref 8.5–10.5)
CHLORIDE SERPL-SCNC: 104 MMOL/L (ref 96–112)
CO2 SERPL-SCNC: 23 MMOL/L (ref 20–33)
CREAT SERPL-MCNC: 0.43 MG/DL (ref 0.5–1.4)
EOSINOPHIL # BLD AUTO: 0 K/UL (ref 0–0.51)
EOSINOPHIL NFR BLD: 0 % (ref 0–6.9)
ERYTHROCYTE [DISTWIDTH] IN BLOOD BY AUTOMATED COUNT: 70 FL (ref 35.9–50)
GFR SERPLBLD CREATININE-BSD FMLA CKD-EPI: 155 ML/MIN/1.73 M 2
GLOBULIN SER CALC-MCNC: 2.3 G/DL (ref 1.9–3.5)
GLUCOSE SERPL-MCNC: 91 MG/DL (ref 65–99)
HCT VFR BLD AUTO: 24.4 % (ref 42–52)
HGB BLD-MCNC: 7.8 G/DL (ref 14–18)
LYMPHOCYTES # BLD AUTO: 0.48 K/UL (ref 1–4.8)
LYMPHOCYTES NFR BLD: 44 % (ref 22–41)
MACROCYTES BLD QL SMEAR: ABNORMAL
MANUAL DIFF BLD: NORMAL
MCH RBC QN AUTO: 31.8 PG (ref 27–33)
MCHC RBC AUTO-ENTMCNC: 32 G/DL (ref 33.7–35.3)
MCV RBC AUTO: 99.6 FL (ref 81.4–97.8)
MONOCYTES # BLD AUTO: 0.07 K/UL (ref 0–0.85)
MONOCYTES NFR BLD AUTO: 6 % (ref 0–13.4)
MORPHOLOGY BLD-IMP: NORMAL
NEUTROPHILS # BLD AUTO: 0.55 K/UL (ref 1.82–7.42)
NEUTROPHILS NFR BLD: 50 % (ref 44–72)
NRBC # BLD AUTO: 0 K/UL
NRBC BLD-RTO: 0 /100 WBC
PLATELET # BLD AUTO: 126 K/UL (ref 164–446)
PLATELET BLD QL SMEAR: NORMAL
PMV BLD AUTO: 11.5 FL (ref 9–12.9)
POTASSIUM SERPL-SCNC: 4.3 MMOL/L (ref 3.6–5.5)
PROT SERPL-MCNC: 5.5 G/DL (ref 6–8.2)
RBC # BLD AUTO: 2.45 M/UL (ref 4.7–6.1)
RBC BLD AUTO: PRESENT
SODIUM SERPL-SCNC: 137 MMOL/L (ref 135–145)
WBC # BLD AUTO: 1.1 K/UL (ref 4.8–10.8)

## 2023-05-02 PROCEDURE — 85007 BL SMEAR W/DIFF WBC COUNT: CPT

## 2023-05-02 PROCEDURE — 80053 COMPREHEN METABOLIC PANEL: CPT

## 2023-05-02 PROCEDURE — 85025 COMPLETE CBC W/AUTO DIFF WBC: CPT

## 2023-05-02 PROCEDURE — 99214 OFFICE O/P EST MOD 30 MIN: CPT | Performed by: PEDIATRICS

## 2023-05-02 PROCEDURE — 36591 DRAW BLOOD OFF VENOUS DEVICE: CPT

## 2023-05-02 PROCEDURE — 82248 BILIRUBIN DIRECT: CPT

## 2023-05-02 PROCEDURE — 700111 HCHG RX REV CODE 636 W/ 250 OVERRIDE (IP): Mod: UD | Performed by: PEDIATRICS

## 2023-05-02 PROCEDURE — A4212 NON CORING NEEDLE OR STYLET: HCPCS

## 2023-05-02 RX ORDER — ONDANSETRON 2 MG/ML
8 INJECTION INTRAMUSCULAR; INTRAVENOUS ONCE
Status: CANCELLED | OUTPATIENT
Start: 2023-05-05

## 2023-05-02 RX ORDER — HEPARIN SODIUM,PORCINE 10 UNIT/ML
30 VIAL (ML) INTRAVENOUS PRN
Status: CANCELLED | OUTPATIENT
Start: 2023-05-02

## 2023-05-02 RX ORDER — ONDANSETRON 2 MG/ML
8 INJECTION INTRAMUSCULAR; INTRAVENOUS EVERY 8 HOURS PRN
Status: CANCELLED | OUTPATIENT
Start: 2023-05-05

## 2023-05-02 RX ORDER — 0.9 % SODIUM CHLORIDE 0.9 %
20 VIAL (ML) INJECTION PRN
Status: CANCELLED | OUTPATIENT
Start: 2023-05-02

## 2023-05-02 RX ORDER — LIDOCAINE AND PRILOCAINE 25; 25 MG/G; MG/G
CREAM TOPICAL PRN
Status: CANCELLED | OUTPATIENT
Start: 2023-05-05

## 2023-05-02 RX ORDER — HEPARIN SODIUM (PORCINE) LOCK FLUSH IV SOLN 100 UNIT/ML 100 UNIT/ML
500 SOLUTION INTRAVENOUS PRN
Status: CANCELLED | OUTPATIENT
Start: 2023-05-02

## 2023-05-02 RX ORDER — SODIUM CHLORIDE 9 MG/ML
500 INJECTION, SOLUTION INTRAVENOUS CONTINUOUS
Status: CANCELLED | OUTPATIENT
Start: 2023-05-05

## 2023-05-02 RX ORDER — LORAZEPAM 2 MG/ML
1 CONCENTRATE ORAL EVERY 6 HOURS PRN
Status: CANCELLED | OUTPATIENT
Start: 2023-05-05

## 2023-05-02 RX ADMIN — HEPARIN 500 UNITS: 100 SYRINGE at 16:07

## 2023-05-02 ASSESSMENT — FIBROSIS 4 INDEX: FIB4 SCORE: 1.85

## 2023-05-02 NOTE — H&P
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  Neurological: Negative for headache.  Still has some peripheral neuropathy complaints in feet.  Endo/Heme/Allergies: Does not bruise/bleed easily.    Psychiatric/Behavioral: No changes in mood, appropriate for age.     PAST MEDICAL HISTORY:     Oncologic History:  2-3 week history of worsening fatigue, right lower extremity pain  Presentation to OS and diagnosed with right LE superficial thrombus, subsegmental PE and hyperleukocytosis, anemia and thrombocytopenia  Transferred to [facility] for definitive care  Presenting (local) WBC > 440K, Hgb 10.0, platelets 53, (automated differential ANC 3190, ALC 75,310, absolute monocyte count 52670, absolute blast count 340,560)  Uric Acid 15.6, phosphorus 5.6, LDH 1114  Rasburicase x 1 dose given   Peripheral Blood flow cytometry demonstrating CD10 pos, CD19 pos, CD20 neg, CD22 dim (60%)  5/28/2022  Peripheral blood FISH for BCR-ABL1 positive in 98% of analyzed cells     Age at Diagnosis: 21 years  White Blood Cell Count at Presentation: > 440 k/uL  Testicular Disease Status: Negative (see procedure note 5/30/2022)  CNS Status: 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, diagnostic LP with WBC 1, RBC 3 and no evidence of leukemic blasts 5/30/2022  Steroid Pre-treatment: None  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 for [facility] purposes:  Flow cytom  Of the blood pressure little high that is a problem is a cultural problem is well and cultural genetic and everything else like that unfortunately breathalyzers such bad heart disease diabetes things like that  populations etry consistent with peripheral blood, cytogenetics remarkable for known t(9;22)  CSF with WBC 1, RBC 3, no blasts identified on cytospin  FISH results available 5/31/2022 making patient eligible for transfer from [place] [address] to [place] [address] as eligibility requirements were met for [place] [address]  Patient unenrolled from [place] [address] (BCR-ABL1 fusion positive) 6/1/2022  Consent signed for [place] [address] and patient enrolled 6/1/2022 (see eligibility checklist from 5/31/2022 and consent note from 6/1/2022)  Imatinib 400 mg PO QAM / 200 mg PO QPM started 6/3/2022 (allowed per [place] [address])  Patient completed the first 15 days of a Standard 4-drug Induction on 6/13/2022  Start of [facility]1631(OS), Induction IA  Part 2, Day 15 6/13/2022  End of Induction 1A Part 2 - MRD negative  Start of [facility] [id number](OS), Induction IA Part 2, Day 15 7/5/2022  Induction IB DELAYED 2 weeks 14 days from 7/26/2022-8/9/2022) for myelosuppression - Start of Day 22 cytarabine block 8/9/2022  Induction IB Day 42 delayed 9 days for myelosuppression - Day 42 evaluations 9/7/2022  End of Induction IB - Flow cytometric MRD negative, MRD by IgH-TCR PCR [id number]%  Randomization to AR-Experimental Arm B of [id number]  Start of AR-Experimental Arm B, Interim Maintenance 9/29/2022  Weight based increase in dose of imatinib to 400 mg PO AM and 250 mg PM 9/29/2022  Thrombocytopenia not permissive of proceeding with Day 15 of Interim Maintenance  AR-Experimental Arm B, Interim Maintenance, Day 15 delayed 4 days, start 10/17/2022  AR-Experimental Arm B, Interim Maintenance, Day 29, start 11/1/2022  AR-Experimental Arm B, Interim Maintenance, Day 43, start 11/14/2022  Last does of 6-MP 11/27/2022  AR-Experimental Arm B, Delayed Intensification, Day 1, start 12/6/2022  Admission with Severe Septic Shock 12/27/2022  Imatinib HELD 12/27/2022-1/8/2023  AR-Experimental Arm B, Delayed Intensification, Day 29 DELAYED 14 days with start 1/17/2023  Hospitalization 2/7/2023 on Day 50 of Delayed Intensification with left shoulder pain ultimately diagnosed with Bacteroides fragilis infection  AR-Experimental Arm B, Interim Maintenance, Day 1 DELAYED 7 days with start 2/27/2023  AR-Experimental Arm B, Interim Maintenance, Day 11 DELAYED 4 days for platelets 43K on 3/9/2023  AR-Experimental Arm B, Interim Maintenance, Day 11 VCR given and MTX omitted for platelets 43K 3/13/2023  Imatinib HELD 3/30/2023-4/11/2023  AR-Experimental Arm B, Interim Maintenance, Day 21 (DELAYED 22 DAYS) - administered 4/11/2023 with methotrexate 80 mg/m² IV  AR-Experimental Arm B, Interim Maintenance, Day 31 (DELAYED 7 DAYS) - vincristine and IT methotrexate to be given 4/28/2023 with recheck of  counts at 10 days of delay 5/2/2023 to evaluate for whether IV methotrexate can be given     Past Medical History:    1) Previously Healthy  2) 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  3) Hyperleukocytosis  4) Hyperuricemia  5) Hyperphosphatemia  6) Right Lower Extremity Superficial Thrombus  7) Subsegmental Pulmonary Embolism  8) 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   Two dogs.  Everyone is well in the house. Father not involved.     Family History:     Family History             Family History   Problem Relation Age of Onset    No Known Problems Mother      Diabetes Paternal Grandfather      Hypertension Paternal Grandfather      Hyperlipidemia Paternal Grandfather      Cancer Neg Hx      Heart Disease Neg Hx      Stroke Neg Hx           No significant family history of cancer.  Both maternal and paternal family history of diabetes.     Immunizations:  UTD     Medications:   Current Outpatient Medications on File Prior to Encounter   Medication Sig Dispense Refill    famotidine (PEPCID) 20 MG Tab Take 1 Tablet by mouth 2 times a day. 60 Tablet 1    OLANZapine (ZYPREXA) 5 MG Tab Take 1 Tablet by mouth every evening. 30 Tablet 1    Melatonin 5 MG Cap Take 5 mg by mouth at bedtime as needed.      imatinib (GLEEVEC) 400 MG tablet Take 1 Tablet by mouth every day. Pt takes 200 mg + 400 mg for a total dose of 600 mg daily 30 Tablet 5     "imatinib (GLEEVEC) 100 MG tablet Take 2 Tablets by mouth every day. Pt takes 200 mg + 400 mg for a total dose of 600 mg daily 60 Tablet 5    Omega-3 Fatty Acids (FISH OIL PO) Take 1 Capsule by mouth every day.      ondansetron (ZOFRAN ODT) 8 MG TABLET DISPERSIBLE Dissolve 1 Tablet by mouth every 6 hours as needed for Nausea/Vomiting. 10 Tablet 0    sulfamethoxazole-trimethoprim (BACTRIM DS) 800-160 MG tablet Take 2 Tablets by mouth in the morning every Sat and Sun.      vitamin D3 (CHOLECALCIFEROL) 1000 Unit (25 mcg) Tab Take 1 Tablet by mouth every day.      therapeutic multivitamin-minerals (THERAGRAN-M) Tab Take 1 Tablet by mouth every day.      apixaban (ELIQUIS) 2.5mg Tab Take 2.5 mg by mouth 2 times a day. (Patient not taking: Reported on 4/28/2023)       No current facility-administered medications on file prior to encounter.       OBJECTIVE:     Vitals:   BP 93/57   Pulse 86   Temp 36.8 °C (98.3 °F) (Temporal)   Resp 20   Ht 1.652 m (5' 5.04\")   Wt 58.3 kg (128 lb 8.5 oz)   SpO2 100%     Labs:  Hospital Outpatient Visit on 04/28/2023   Component Date Value    Number Of Tubes 04/28/2023 2     Volume 04/28/2023 2.0     Color-Body Fluid 04/28/2023 Colorless     Character-Body Fluid 04/28/2023 Clear     Supernatant Appearance 04/28/2023 Colorless     Total RBC Count 04/28/2023 1     Crenated RBC 04/28/2023 0     Total WBC Count 04/28/2023 2     Polys 04/28/2023 4     Lymphs 04/28/2023 82     Mononuclear Cells - CSF 04/28/2023 14     Comments 04/28/2023 see below     CSF Tube Number 04/28/2023 2     Cytology Reg 04/28/2023 See Path Report    Hospital Outpatient Visit on 04/27/2023   Component Date Value    WBC 04/27/2023 1.2 (LL)     RBC 04/27/2023 2.45 (L)     Hemoglobin 04/27/2023 7.7 (L)     Hematocrit 04/27/2023 24.4 (L)     MCV 04/27/2023 99.6 (H)     MCH 04/27/2023 31.4     MCHC 04/27/2023 31.6 (L)     RDW 04/27/2023 77.1 (H)     Platelet Count 04/27/2023 66 (L)     MPV 04/27/2023 11.6     " Neutrophils-Polys 04/27/2023 32.50 (L)     Lymphocytes 04/27/2023 46.50 (H)     Monocytes 04/27/2023 21.00 (H)     Eosinophils 04/27/2023 0.00     Basophils 04/27/2023 0.00     Nucleated RBC 04/27/2023 0.00     Neutrophils (Absolute) 04/27/2023 0.39 (LL)     Lymphs (Absolute) 04/27/2023 0.56 (L)     Monos (Absolute) 04/27/2023 0.25     Eos (Absolute) 04/27/2023 0.00     Baso (Absolute) 04/27/2023 0.00     NRBC (Absolute) 04/27/2023 0.00     Anisocytosis 04/27/2023 2+ (A)     Macrocytosis 04/27/2023 2+ (A)     Sodium 04/27/2023 137     Potassium 04/27/2023 4.1     Chloride 04/27/2023 106     Co2 04/27/2023 20     Anion Gap 04/27/2023 11.0     Glucose 04/27/2023 94     Bun 04/27/2023 19     Creatinine 04/27/2023 0.50     Calcium 04/27/2023 8.0 (L)     AST(SGOT) 04/27/2023 42     ALT(SGPT) 04/27/2023 52 (H)     Alkaline Phosphatase 04/27/2023 123 (H)     Total Bilirubin 04/27/2023 0.2     Albumin 04/27/2023 3.1 (L)     Total Protein 04/27/2023 5.2 (L)     Globulin 04/27/2023 2.1     A-G Ratio 04/27/2023 1.5     Direct Bilirubin 04/27/2023 <0.2     Indirect Bilirubin 04/27/2023 see below     Correct Calcium 04/27/2023 8.7     GFR (CKD-EPI) 04/27/2023 148     Manual Diff Status 04/27/2023 PERFORMED     Peripheral Smear Review 04/27/2023 see below     Plt Estimation 04/27/2023 Decreased     RBC Morphology 04/27/2023 Present     Large Platelets 04/27/2023 1+     Polychromia 04/27/2023 1+         Physical Exam:    Constitutional: Well-developed, well-nourished, and in no distress.  Very well-appearing.  HENT: Normocephalic and atraumatic. No nasal congestion or rhinorrhea. Oropharynx is clear and moist. No oral ulcerations or sores.    Eyes: Conjunctivae are normal. Pupils are equal, round.  EOMI.  Nonicteric.  Cardiovascular: Normal rate, regular rhythm and normal heart sounds.  No murmur heard. DP/radial pulses 2+, cap refill < 2 sec.  Pulmonary/Chest: Effort normal and breath sounds normal. No respiratory distress.  Symmetric expansion.  No crackles or wheezes.  Abdomen: Soft. Bowel sounds are normal. No distension and no mass. There is no hepatosplenomegaly.    Genitourinary:  Deferred.  Musculoskeletal: Normal range of motion of lower and upper extremities bilaterally.   Neurological: Alert and oriented to person and place. Exhibits normal muscle tone bilaterally in upper and lower extremities. Gait normal. Coordination normal.    Skin: Skin is warm, dry and pink.  No rash or evidence of skin infection.  No pallor.   Psychiatric: Mood and affect normal for age.    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 and whose End of Induction IB MRD was both negative by flow cytometry and PCR who presents for Interim Maintenance, Day 31 with Capizzi MTX  and LP with IT MTX (DELAYED 7 DAYS NOW)     2) Ph+ Precursor B-Cell Acute Lymphoblastic Leukemia, in MRD Remission:  - 2-3 weeks of symptoms  - Presenting WBC > 440 k/uL, hyperleukocytosis  - Start of Hydroxyurea (1500 mg PO Q8) 2320 on 5/27/2022  - discontinued after only 55 hours  - No steroid pretreatment  - 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  - Testicular status NEGATIVE       - Flow cytometry of both 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, FISH positive for BCR-ABL1 translocation  - Enrolled on [facility] [id number]  - Initially enrolled on [facility] [id number] - but 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                            - Enrolled on [facility] [id number] and began study 6/13/2022              - Started imatinib therapy 6/3/2022   - End of Induction IA and IB MRD negative  - Imatinib dose increased to 400 mg PO AM and 250 mg PM 9/29/2022  -* Note:  All imatinib doses given inpatient rounded down to 600 mg  - Imatinib held for Septic Shock 12/27/2022-1/8/2023  - Imatinib 600 mg PO daily restarted 1/8/2023 inpatient  -  Imatinib 400 mg PO QAM and 250 mg PO QHS 1/14/2023-1/17/2023  - Imatinib 600 mg PO QAM 1/17/2023 - 3/30/2023                - WBC 1.2, Hgb 7.7, platelets 66             - , , abs mono 250                - Continued improvement in platelets permissible for lumbar puncture.  As ANC still not yet recovers, VCR and IT MTX given with repeat counts scheduled on 5/2/2023 for evaluation of whether MTX IV is permissible (10 days from original presentation for Day 31)      [id number], AR Arm B, Interim Maintenance, Day 31 (DELAYED):      ** Continue imatinib 600 mg PO daily      ** HOLD Methotrexate 138.5 mg IV x 1 dose (Previous dose on Day 21 = 80 mg/m² which reflects a 20% dose reduction from the dose prior to that on Day 1) - will re-evaluate 5/2/2023   ** GIVE Vincristine 2 mg IV x1  ** GIVE Methotrexate 12 mg IT (see separate procedure note)     - Return to clinic 3 days for re-evaluation     2) Soft Tissue Infection / Joint Infection with Bacteroides fragilis:  - Edema and pain continue to improve  - Was initially on cefepime for F&N, added IV Vancomycin on 2/10/2023, discontinued both cefepime and vancomycin on 2/20/2023 after start of IV Flagyl (below)  - S/P open exploration/drainage 2/17/2023  - Culture: B.fragilis (both deep and superficial cultures)   - ID Consultation with Dr. Singh - recommendation for switch to metronidazole for 4+ weeks  - Flagyl DISCONTINUED 3/27/2023 after 5 weeks of therapy   - Neuropathies stable     3) Left Shoulder Pain (RESOLVED):     4) Chemotherapy Related Pancytopenia:  - WBC 1.2, Hgb 7.7, platelets 66  - ,   - Transfuse for hemoglobin less than 7.5 or symptomatic  - Transfuse for platelets less than 10,000 or symptomatic  - No transfusions necessary today     5) At Risk for DVT Secondary to PEG Asparaginase:  - Apixaban held in anticipation of LP today  - Re-start with evening dose (Day 16 from PEG-Aspariginase)     6) Tachycardia (STABLE):   - Previous  cardiac work-up to include echocardiogram as well as telemetry     7) History of Peptic Ulcer Disease/Bleeding:  - No longer taking Pepcid     8) Poor Appetite / Nausea (IMPROVED):  - Reports occasional nausea, improved appetite         - Addition of Zyprexa has made considerable improvement      9) At Risk of Opportunistic Lung Infection:  - Bactrim DS PO BID Sat and Sun for PJP prophylaxis     10) Central Access:   - R Port-A-Cath in place      Research Participant:              [facility] - Source Data         Diagnosis: Ph+ Precursor B-Cell Acute Lymphoblastic Leukemia     Disease Status: EOI1A MRD NEGATIVE, EOI1B RD NEGATIVE, CNS3c, testicular negative, HSV1 IgG POSITIVE, CMV IgG NEGATIVE, VARICELLA IgG POSITIVE     Active Studies: [id number], [id number]                                                                                                      Inactive Studies: [id number]                                                                                                                                                Optional Studies: None             Protocol: International Phase 3 trial in [place] 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      Treatment Plan: [id number](OS), AR-Arm B, Interim Maintenance, Day 31 (DELAYED 7 DAYS)     Height: 1.650 m      Weight: 64.9kg       BSA: 1.73 m²   (Interim Maintenance, Day 1 2/27/2023)                                                                                                                                           Performance Status: Karnofsky 80, ECOG 2 (4/25/2023)     Treatment Plan Medications:  (100% adherent with imatinib)     Imatinib 600 mg QAM     Evaluations / Study Labs:       WBC 1.2, Hgb7.7, platelets 66       Therapy Given:      Vincristine 2 mg IV  Methotrexate 12 mg IT    Methotrexate IV HELD with re-evaluation 5/2/2023  (at 10 days from original Day 31 date)        Disposition: Return to clinic 5/2/2023 for re-evaluation of Day 31 IV methotrexate  Pepe Faye MD  Pediatric Hematology / Oncology  [facility].  [phone]  [facility]. [phone]

## 2023-05-02 NOTE — ADDENDUM NOTE
Encounter addended by: Pepe Faye M.D. on: 5/2/2023 8:49 AM   Actions taken: Level of Service modified, Delete clinical note, Clinical Note Signed

## 2023-05-02 NOTE — PROCEDURES
"Tomas Jean-Baptiste is a 22 y.o. male patient.  BP 93/57   Pulse 86   Temp 36.8 °C (98.3 °F) (Temporal)   Resp 20   Ht 1.652 m (5' 5.04\")   Wt 58.3 kg (128 lb 8.5 oz)   SpO2 100%     Procedures    Pepe Faye M.D.  5/2/2023  "

## 2023-05-02 NOTE — PROGRESS NOTES
Ana from Lab called with critical result of WBC 1.1 and preliminary ANC 0.37 at 1552. Critical lab result read back to Ana.   Dr. Rodriguez notified of critical lab result at 1554.  Critical lab result read back by Dr. Rodriguez.

## 2023-05-02 NOTE — PROCEDURES
Pediatric Oncology Lumbar Puncture  Procedure Note      Patient Name:  Tomas Jean-Baptiste  : 2000   MRN: [id number]    Service Location:  [facility]  Date of Service: 2023  Time: 11:00 AM    Procedure Performed By: Pepe Faye M.D.    Pre-procedural Diagnosis:  Acute B-Lymphoblastic Leukemia (C91.01) having achieved remission    Post-procedural Diagnosis: Acute B-Lymphoblastic Leukemia (C91.01) having achieved remission    Procedure:  Lumbar Puncture with administration of intrathecal chemotherapy    Anxiolysis:   Versed 2 mg IV x 1    Intrathecal Chemotherapy:  Yes    Chemotherapy Administered:  Methotrexate 12 mg IT (in 6 mL NS)    Needle Size:  22 gauge, 3.0 in  Site: L3-L4  Number of Attempts: 1  Fluid:  6 ml clear fluid obtained  Labs: Cell count, cytology    Complications:  None    Procedure Note:    Tomas Jean-Baptiste is a 22 y.o. 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  He presents today for scheduled chemotherapy, Day 31 of Interim Maintenance and scheduled lumbar puncture with intrathecal chemotherapy.  Prior to the procedure, the risks and benefits were discussed with the patient and family.  Consent for the procedure was signed by patient and placed in his chart.  All pertinent labs and history were reviewed and a complete History and Physical Examination were performed and placed in the medical record.  Tomas Roy was then taken to the procedure room where the procedure was performed.  All necessary safety equipment per ASA guidelines were available.  A time-out was performed and the patient identified by name,  and medical record number.  Gloves and mask were worn during the entire procedure.  A one time dose of versed 2 mg IV was administered for anxiolysis.  Tomas Roy was prepped and draped in the usual sterile fashion with povoiodine.  He was positioned in the  left decubitus position and all landmarks including superior posterior iliac crest, umbilicus and vertebral bodies were identified by palpation.  A 3.0 in, 22 gauge spinal needle was introduced into the L3-L4 spinal interspace.  6 ml of clear fluid were obtained and sent for cell count and cytology.  Methotrexate 12 mg in 6 mL of NS was verified with the nurse bedside and then then administered into the spinal fluid. Tomas Roy tolerated the procedure without complication or bleeding.     Results:    PENDING    Pepe Faye MD  Pediatric Hematology / Oncology  [facility]  Cell.  [phone]

## 2023-05-02 NOTE — PROGRESS NOTES
Pt to [facility] for lab draw, doctors office visit, and chemotherapy administration.      Afebrile.  VSS.  Awake and alert in no acute distress.      Port accessed using a 22g 3/4 inch trevino needle with 1 attempt by Tammie Covington RN.  Labs drawn from the port without difficulty.   Pt tolerated well.      Office visit with Dr. Rodriguez completed. Preliminary ANC  0.37- chemotherapy held today.     Port flushed per orders (see MAR) and de-accessed after completion.  Will return for next visit on 5/8/23 for chemotherapy.

## 2023-05-02 NOTE — PROGRESS NOTES
"Pediatric Hematology / Oncology  Progress Note      Patient Name:  Tomas Jean-Baptiste  : 2000   MRN: [id number]    Location of Service:  [facility]  Date of Service: 2023  Time: 4:28 PM    Primary Care Physician: Margarito Arvizu M.D.    Protocol/Treatment Plan:    HISTORY OF PRESENT ILLNESS:     Chief Complaint: Here for blood counts and possible chemotherapy.    History of Present Illness: Tomas Jean-Baptiste is a 22 y.o. male who presents to the [facility] for scheduled follow-up.  He has been receiving \"Interim Maintenance 2\" treatment as per protocol [id number] for his Ph+ Acute B-Lymphoblastic Leukemia. Therapy has been complicated recently by myelosuppression, which has resulted in treatment delays and omissions.    JULY was last seen on , when he received \"Day 31\" treatment.  This had been postponed, per protocol, on  due to thrombocytopenia.       Review of Systems:     Constitutional: Afebrile. Good appetite, \"ok\" energy.  HENT: Negative for nosebleeds and mouth sores.  Eyes: Negative for visual changes.  Respiratory: Negative for shortness of breath or cough.    Gastrointestinal: Negative for vomiting, abdominal pain, diarrhea, constipation and blood in stool.    Musculoskeletal: Residual left shoulder weakness and decreased ROM (but improving).    Skin: Negative for rash, signs of infection.  Neurological: Negative for focal weakness; mild headaches.    Endo/Heme/Allergies: Does not bruise/bleed easily.    Psychiatric/Behavioral: No changes in mood, appropriate for age.     PAST MEDICAL HISTORY:     Past Medical History:    Past Medical History:   Diagnosis Date    Cancer (HCC)     Leukoma        Past Surgical History:     Past Surgical History:   Procedure Laterality Date    INCISION AND DRAINAGE GENERAL Left 2023    Procedure: INCISION AND DRAINAGE OF LEFT SHOULDER;  " Surgeon: Luke Haddad M.D.;  Location: SURGERY [facility];  Service: General    CATH PLACEMENT Right 8/29/2022    Procedure: INSERTION, CATHETER;  Surgeon: Simona Moise M.D.;  Location: SURGERY [facility];  Service: Ent        Birth/Developmental History:  No birth history on file.     Allergies:   Allergies as of 05/02/2023 - Reviewed 04/28/2023   Allergen Reaction Noted    Amoxicillin Rash 04/03/2020       Home Medications:    Current Outpatient Medications   Medication Sig Dispense Refill    apixaban (ELIQUIS) 2.5mg Tab Take 2.5 mg by mouth 2 times a day. (Patient not taking: Reported on 4/28/2023)      famotidine (PEPCID) 20 MG Tab Take 1 Tablet by mouth 2 times a day. 60 Tablet 1    OLANZapine (ZYPREXA) 5 MG Tab Take 1 Tablet by mouth every evening. 30 Tablet 1    Melatonin 5 MG Cap Take 5 mg by mouth at bedtime as needed.      imatinib (GLEEVEC) 400 MG tablet Take 1 Tablet by mouth every day. Pt takes 200 mg + 400 mg for a total dose of 600 mg daily 30 Tablet 5    imatinib (GLEEVEC) 100 MG tablet Take 2 Tablets by mouth every day. Pt takes 200 mg + 400 mg for a total dose of 600 mg daily 60 Tablet 5    Omega-3 Fatty Acids (FISH OIL PO) Take 1 Capsule by mouth every day.      ondansetron (ZOFRAN ODT) 8 MG TABLET DISPERSIBLE Dissolve 1 Tablet by mouth every 6 hours as needed for Nausea/Vomiting. 10 Tablet 0    sulfamethoxazole-trimethoprim (BACTRIM DS) 800-160 MG tablet Take 2 Tablets by mouth in the morning every Sat and Sun.      vitamin D3 (CHOLECALCIFEROL) 1000 Unit (25 mcg) Tab Take 1 Tablet by mouth every day.      therapeutic multivitamin-minerals (THERAGRAN-M) Tab Take 1 Tablet by mouth every day.       Current Facility-Administered Medications   Medication Dose Route Frequency Provider Last Rate Last Admin    heparin lock flush 100 unit/mL injection 500 Units  500 Units Intracatheter PRN Pepe Faye M.D.   500 Units at 05/02/23 1607          OBJECTIVE:     Vitals:   /71   Pulse (!)  "125   Temp 36.9 °C (98.4 °F) (Temporal)   Resp 20   Ht 1.652 m (5' 5.04\")   Wt 58.1 kg (128 lb 1.4 oz)   SpO2 98%     Labs:    Hospital Outpatient Visit on 05/02/2023   Component Date Value    WBC 05/02/2023 1.1 (LL)     RBC 05/02/2023 2.45 (L)     Hemoglobin 05/02/2023 7.8 (L)     Hematocrit 05/02/2023 24.4 (L)     MCV 05/02/2023 99.6 (H)     MCH 05/02/2023 31.8     MCHC 05/02/2023 32.0 (L)     RDW 05/02/2023 70.0 (H)     Platelet Count 05/02/2023 126 (L)     MPV 05/02/2023 11.5     Neutrophils-Polys 05/02/2023 50.00     Lymphocytes 05/02/2023 44.00 (H)     Monocytes 05/02/2023 6.00     Eosinophils 05/02/2023 0.00     Basophils 05/02/2023 0.00     Nucleated RBC 05/02/2023 0.00     Neutrophils (Absolute) 05/02/2023 0.55 (L)     Lymphs (Absolute) 05/02/2023 0.48 (L)     Monos (Absolute) 05/02/2023 0.07     Eos (Absolute) 05/02/2023 0.00     Baso (Absolute) 05/02/2023 0.00     NRBC (Absolute) 05/02/2023 0.00     Anisocytosis 05/02/2023 3+ (A)     Macrocytosis 05/02/2023 3+ (A)     Sodium 05/02/2023 137     Potassium 05/02/2023 4.3     Chloride 05/02/2023 104     Co2 05/02/2023 23     Anion Gap 05/02/2023 10.0     Glucose 05/02/2023 91     Bun 05/02/2023 14     Creatinine 05/02/2023 0.43 (L)     AST(SGOT) 05/02/2023 48 (H)     ALT(SGPT) 05/02/2023 76 (H)     Alkaline Phosphatase 05/02/2023 132 (H)     Total Bilirubin 05/02/2023 0.6     Albumin 05/02/2023 3.2     Total Protein 05/02/2023 5.5 (L)     Globulin 05/02/2023 2.3     A-G Ratio 05/02/2023 1.4        Physical Exam:    Constitutional: Well-developed, well-nourished, and in no distress.  pale.  HENT: Normocephalic and atraumatic; partial alopecia. No nasal congestion or rhinorrhea. Oropharynx is clear and moist. No oral ulcerations or sores.    Eyes: Conjunctivae are normal. Pupils are equal, round, and reactive to light. No scleral icterus.    Neck: Normal range of motion of neck, no adenopathy.    Cardiovascular: Normal rate, regular rhythm and normal heart " "sounds.  No murmur heard. Distal cap refill < 2 sec  Pulmonary/Chest: Effort normal and breath sounds normal.  Symmetric expansion.    Abdomen: Soft. Bowel sounds are normal. No distension and no mass. There is no hepatosplenomegaly.    Skin:  No rash or evidence of skin infection.   Psychiatric: Mood and affect normal for age.      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  Treated on Arm B of [id number] with imatinib plus augmented BFM chemotherapy.  JULY is approaching the end of \"interim maintenance 2\" treatment, which will be followed by maintenance treatment through September 2024.      Relevant Medications    heparin lock flush 100 unit/mL injection 500 Units    Encounter for chemotherapy management     Today's platelet count and absolute neutrophil count are adequate for continuing chemotherapy.  It has, however, been only 4 days since JULY received his (delayed) \"Day 31\" dose of vincristine (holding methotrexate because of myelosuppression).  For this reason, no treatment is to be administered today.    Relevant Orders    CBC WITH DIFFERENTIAL (Completed)    CMP (Completed)    BILIRUBIN DIRECT (Completed)    History of deep vein thrombosis     JULY has been treated intermittently with oral apixaban (following doses of PEG-asparaginase, which predispose to thrombosis).  He recently completed another course of apixaban and is now holding this medication.  Of note, there are no further doses of PEG-asparaginase in his treatment regimen.      Relevant Orders    CBC WITH DIFFERENTIAL (Completed)    CMP (Completed)    BILIRUBIN DIRECT (Completed)        Research Participant:           [facility] - Source Data         Diagnosis: Ph+ Precursor B-Cell Acute Lymphoblastic Leukemia     Disease Status: EOI1A MRD NEGATIVE, EOI1B RD NEGATIVE, CNS3c, testicular negative, HSV1 IgG POSITIVE, CMV IgG " NEGATIVE, VARICELLA IgG POSITIVE     Active Studies: [id number], [id number]                                                                                                      Inactive Studies: [id number]                                                                                                                                                Optional Studies: None             Protocol: International Phase 3 trial in [place] 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      Treatment Plan: [id number](OS), AR-Arm B, Interim Maintenance, Day 38     Height: 1.650 m      Weight: 64.9kg       BSA: 1.73 m²   (Interim Maintenance, Day 1 2/27/2023)                                                                                                                                           Performance Status: Karnofsky 70, ECOG  3 (3/9/2023)     Treatment Plan Medications:       Imatinib HELD 3/30/2023, restarted 4/11/2023     Evaluations / Study Labs:  (5/2/2023)      WBC 1.1, Hgb 7.8 platelets 126   ,      Therapy Given: (5/2/2023)   None         Disposition: Return to clinic on 5/5/2023 for Day 41 of Interim Maintenance         ANN MARIE Rodriguez MD  Pediatric Hematology / Oncology  [facility]  Cell.  [phone]  [facility]. [phone]

## 2023-05-03 NOTE — PROGRESS NOTES
Pediatric Orthopedic Consult Note:    Luke Haddad M.D.  Date & Time note created:    2/12/2023  10:00 a.m.     Referring MD:  Dr. Alyce Duenas    Patient ID:  Name:             Tomas Jean-Baptiste   YOB: 2000  Age:                 22 y.o.  male   MRN:               [id number]                                                     Chief complaint: Left shoulder pain    History of present illness:  Tomas Jean-Baptiste 22 y.o. male who was admitted due to neutropenic fever from treatment of his leukemia. He started having left shoulder pain about 1 weeks ago. He feels that today may be a little worse, requiring pain medication to reduce the pain.    Interval History (2/11/2023):  JULY was examined this morning. His pain profile is relatively unchanged from yesterday. The pain medication helps quite a bit. Still has pain in anterior left shoulder.    Interval History (2/12/2023):  JULY was re-examined this morning. His pain profile is still unchanged from prior. The pain medication continues to help out quite a bit. Still has pain in anterior left shoulder. His labs have improved as has his fever curve.    Interval History (2/13/2023):  JULY was re-examined today. His pain profile is slightly worsened than prior days. The pain medication continues to help, but his left anterior shoulder pain persists. His labs have improved as has his fever curve, but his arm has become significantly more swollen overnight.    Interval History (2/14/2023):  JULY was re-examined today. His right shoulder, upper arm, and elbow have become more swollen with pain controlled on dilaudid. His labs continue to improve and he has remained afebrile.    Interval History (2/15/2023):  JULY was re-examined today. He was a little more somnolent today. His right shoulder, upper arm, and elbow have become more swollen with pain controlled on dilaudid. He complains of more pain today than prior.    Interval History  (2/16/2023):  JULY had his CT scan of her left arm with contrast. It demonstrates fluid within the shoulder joint as well as areas of pyomyositis. He continues to require pain medication for his pain. The arm is becoming more swollen.    Past medical history:   Past Medical History:   Diagnosis Date    Cancer (HCC)     Leukoma        Past surgical history:   Past Surgical History:   Procedure Laterality Date    CATH PLACEMENT Right 8/29/2022    Procedure: INSERTION, CATHETER;  Surgeon: Simona Moise M.D.;  Location: SURGERY [facility];  Service: Ent       Family history:   Family History   Problem Relation Age of Onset    No Known Problems Mother     Stroke Maternal Grandmother     Diabetes Paternal Grandfather     Hypertension Paternal Grandfather     Hyperlipidemia Paternal Grandfather     Cancer Neg Hx     Heart Disease Neg Hx        Social history:   Social History     Socioeconomic History    Marital status: Single     Spouse name: Not on file    Number of children: Not on file    Years of education: Not on file    Highest education level: Not on file   Occupational History    Not on file   Tobacco Use    Smoking status: Never    Smokeless tobacco: Never   Vaping Use    Vaping Use: Never used   Substance and Sexual Activity    Alcohol use: Never    Drug use: Never    Sexual activity: Not Currently   Other Topics Concern    Behavioral problems Not Asked    Interpersonal relationships Not Asked    Sad or not enjoying activities Not Asked    Suicidal thoughts Not Asked    Poor school performance Not Asked    Reading difficulties Not Asked    Speech difficulties Not Asked    Writing difficulties Not Asked    Inadequate sleep Not Asked    Excessive TV viewing Not Asked    Excessive video game use Not Asked    Inadequate exercise Not Asked    Sports related Not Asked    Poor diet Not Asked    Family concerns for drug/alcohol abuse Not Asked    Poor oral hygiene Not Asked    Bike safety Not Asked    Family concerns  vehicle safety Not Asked   Social History Narrative    Not on file     Social Determinants of Health     Financial Resource Strain: Not on file   Food Insecurity: Not on file   Transportation Needs: Not on file   Physical Activity: Not on file   Stress: Not on file   Social Connections: Not on file   Intimate Partner Violence: Not on file   Housing Stability: Not on file       Current medications:   Current Facility-Administered Medications   Medication Dose    HYDROmorphone (DILAUDID) 0.2 mg/mL in 50 mL NS (PCA)      heparin lock flush 100 unit/mL injection 300-500 Units  300-500 Units    vancomycin (VANCOCIN) 1,000 mg in  mL IVPB  1,000 mg    Pharmacy Consult Request ...Pain Management Review 1 Each  1 Each    acetaminophen (Tylenol) tablet 650 mg  650 mg    Followed by    [START ON 2/18/2023] acetaminophen (Tylenol) tablet 650 mg  650 mg    senna-docusate (PERICOLACE or SENOKOT S) 8.6-50 MG per tablet 1 Tablet  1 Tablet    lidocaine (LIDODERM) 5 % 1 Patch  1 Patch    MD Alert...Vancomycin per Pharmacy      LORazepam (ATIVAN) injection 0.5 mg  0.5 mg    lidocaine-prilocaine (EMLA) 2.5-2.5 % cream      D5 1/2 NS infusion      cefepime (Maxipime) 2 g in  mL IVPB  2 g    ondansetron (ZOFRAN) syringe/vial injection 8 mg  8 mg    famotidine (PEPCID) tablet 20 mg  20 mg    chlorhexidine (PERIDEX) 0.12 % solution 15 mL  15 mL    imatinib (Gleevec) tablet 200 mg  200 mg    imatinib (Gleevec) tablet 400 mg  400 mg    therapeutic multivitamin-minerals (THERAGRAN-M) tablet 1 Tablet  1 Tablet    vitamin D3 (cholecalciferol) tablet 1,000 Units  1,000 Units       Allergies:  Allergies   Allergen Reactions    Amoxicillin Rash     Reacted as an infant. No swelling or airway problems.  Tolerated cefepime June 2022       Immunizations:  Immunization History   Administered Date(s) Administered    Dtap Vaccine 2000, 01/29/2002, 06/24/2002, 01/15/2003, 01/23/2006    HIB Vaccine (ACTHIB/HIBERIX) 2000,  01/29/2002, 09/25/2002    HPV Quadrivalent Vaccine (GARDASIL) - HISTORICAL DATA 12/15/2014, 02/17/2015, 06/03/2015    Hepatitis A Vaccine, Ped/Adol 10/03/2003, 01/23/2006    Hepatitis B Vaccine Adolescent/Pediatric 2000, 01/29/2002, 09/25/2002    IPV 2000, 01/29/2002, 06/24/2002, 01/23/2006    Influenza (IM) Preservative Free - HISTORICAL DATA 12/09/2011, 10/17/2013, 09/18/2015, 10/05/2016    Influenza Seasonal Injectable - Historical Data 10/12/2012    Influenza Vac Subunit Quad Inj (Pf) 11/08/2017    Influenza Vaccine Quad Inj (Pf) 11/17/2014, 09/24/2018, 09/27/2019    MMR Vaccine 09/25/2002, 01/15/2003    Meningococcal Conjugate Vaccine MCV4 (MENVEO) 09/24/2018    Meningococcal Conjugate Vaccine MCV4 (Menactra) 12/11/2012    Pneumococcal Vaccine (PCV7) - HISTORICAL DATA 2000, 03/08/2002, 06/24/2002    Tdap Vaccine 10/12/2012    Varicella Vaccine Live 09/25/2002, 01/17/2007       Review of systems:   Constitutional: Fatigued, fevcers,  HENT: Normal breathing, but alopecia  Eyes: Negative for photophobia, discharge and redness.   Respiratory: Negative for cough, wheezing and stridor.    Cardiovascular: Negative for leg swelling.   Gastrointestinal: Negative for constipation, diarrhea, nausea and vomiting.   Genitourinary: No renal disease or abnormalities   Musculoskeletal: Negative for back pain and neck pain, but (+) for left shoulder pain  Skin: Negative for rash.   Neurological: Negative for tremors, sensory change, speech change, focal weakness, seizures, loss of consciousness and weakness.   Endo/Heme/Allergies: thrombocytopenia (+), neutropenia (+)      Physical examination:   Vitals:    02/16/23 1206 02/16/23 1220 02/16/23 1225 02/16/23 1307   BP:    110/53   Pulse: (!) 118   (!) 128   Resp: 12   14   Temp: 36.9 °C (98.4 °F)   37 °C (98.6 °F)   TempSrc: Temporal      SpO2: 91% (!) 86% 99% 99%   Weight:       Height:         Physical Exam    Weak-appearing, but appropriate &  conversive    Left upper extremity:   Left wrist, fingers, elbow, & forearm full ROM, TTP (-)   Increased swelling of left arm from forearm to shoulder (+)   Left shoulder painful with AROM, TTP (+) anteriorly, full, PROM with increased pain   Neuro intract (+)   Skin warm to touch, but febrile & equal to other limbs    ANCILLARY DATA REVIEWED:     Lab Data Review:  Recent Labs     02/14/23  0413 02/15/23  0505 02/16/23  0415   WBC 1.2* 1.5* 1.7*   RBC 2.79* 2.61* 2.58*   HEMOGLOBIN 7.8* 7.3* 7.3*   HEMATOCRIT 23.7* 22.0* 22.6*   MCV 84.9 84.3 87.6   MCH 28.0 28.0 28.3   MCHC 32.9* 33.2* 32.3*   RDW 50.9* 48.6 51.7*   PLATELETCT 45* 36* 44*   MPV 12.0 12.0 10.9                   MICRO:  Blood cultures from 2/9/2023 - no growth     Imaging  XR's left shoulder - negative for fracture or acute injury    MRI left shoulder - inflammation in muscles / myositis without focal collection or abscess; mild amount of joint fluid with fluid surrounding the biceps tendon; osseous changes, likely AVN vs. Myelodysplasia-related, no evidence of osteomyelitis or intra-osseous abscess    U/S left upper extremity - no evidence of DVT or fluid collection    CT left upper extremity with contrast - intra-capsular collection with pyomyositis of adjacent musculature; diffuse edema else where    ASSESSMENT AND PLAN:  Left shoulder myositis, possible biceps tendon involvement, possible developing abscess  Continue current antibiotics  Discussed with Dr. Rodriguez & we feel surgical intervention is needed at this time. Given OR availability, will perform I&D of left arm & shoulder 2/17/2023.  In preparation for OR, blood to be given with platelets available, if needed intra-operatively  Will continue to follow    Luke Haddad M.D.  Pediatric Orthopedics and Scoliosis  [facility]             No

## 2023-05-04 LAB — MISCELLANEOUS LAB RESULT MISCLAB: NORMAL

## 2023-05-05 ENCOUNTER — HOSPITAL ENCOUNTER (OUTPATIENT)
Dept: INFUSION CENTER | Facility: MEDICAL CENTER | Age: 23
End: 2023-05-05
Attending: PEDIATRICS
Payer: COMMERCIAL

## 2023-05-05 VITALS
RESPIRATION RATE: 20 BRPM | OXYGEN SATURATION: 100 % | HEART RATE: 102 BPM | TEMPERATURE: 99.2 F | SYSTOLIC BLOOD PRESSURE: 120 MMHG | DIASTOLIC BLOOD PRESSURE: 68 MMHG | BODY MASS INDEX: 22.63 KG/M2 | WEIGHT: 135.8 LBS | HEIGHT: 65 IN

## 2023-05-05 DIAGNOSIS — R11.0 CHEMOTHERAPY-INDUCED NAUSEA: ICD-10-CM

## 2023-05-05 DIAGNOSIS — Z91.89 AT RISK FOR OPPORTUNISTIC INFECTIONS: ICD-10-CM

## 2023-05-05 DIAGNOSIS — C91.Z0 B LYMPHOBLASTIC LEUKEMIA WITH T(9;22)(Q34;Q11.2);BCR-ABL1 (HCC): ICD-10-CM

## 2023-05-05 DIAGNOSIS — T45.1X5A CHEMOTHERAPY-INDUCED NAUSEA: ICD-10-CM

## 2023-05-05 DIAGNOSIS — Z51.11 ENCOUNTER FOR CHEMOTHERAPY MANAGEMENT: ICD-10-CM

## 2023-05-05 DIAGNOSIS — C91.01 ACUTE LYMPHOBLASTIC LEUKEMIA (ALL) IN REMISSION (HCC): ICD-10-CM

## 2023-05-05 LAB
ALBUMIN SERPL BCP-MCNC: 2.8 G/DL (ref 3.2–4.9)
ALBUMIN/GLOB SERPL: 1.2 G/DL
ALP SERPL-CCNC: 118 U/L (ref 30–99)
ALT SERPL-CCNC: 73 U/L (ref 2–50)
ANION GAP SERPL CALC-SCNC: 8 MMOL/L (ref 7–16)
ANISOCYTOSIS BLD QL SMEAR: ABNORMAL
AST SERPL-CCNC: 49 U/L (ref 12–45)
BASOPHILS # BLD AUTO: 0 % (ref 0–1.8)
BASOPHILS # BLD: 0 K/UL (ref 0–0.12)
BILIRUB SERPL-MCNC: 0.3 MG/DL (ref 0.1–1.5)
BUN SERPL-MCNC: 16 MG/DL (ref 8–22)
CALCIUM ALBUM COR SERPL-MCNC: 8.7 MG/DL (ref 8.5–10.5)
CALCIUM SERPL-MCNC: 7.7 MG/DL (ref 8.5–10.5)
CHLORIDE SERPL-SCNC: 106 MMOL/L (ref 96–112)
CO2 SERPL-SCNC: 22 MMOL/L (ref 20–33)
CREAT SERPL-MCNC: 0.4 MG/DL (ref 0.5–1.4)
EOSINOPHIL # BLD AUTO: 0 K/UL (ref 0–0.51)
EOSINOPHIL NFR BLD: 0 % (ref 0–6.9)
ERYTHROCYTE [DISTWIDTH] IN BLOOD BY AUTOMATED COUNT: 72.4 FL (ref 35.9–50)
GFR SERPLBLD CREATININE-BSD FMLA CKD-EPI: 159 ML/MIN/1.73 M 2
GLOBULIN SER CALC-MCNC: 2.3 G/DL (ref 1.9–3.5)
GLUCOSE SERPL-MCNC: 84 MG/DL (ref 65–99)
HCT VFR BLD AUTO: 21.2 % (ref 42–52)
HGB BLD-MCNC: 6.6 G/DL (ref 14–18)
LYMPHOCYTES # BLD AUTO: 0.6 K/UL (ref 1–4.8)
LYMPHOCYTES NFR BLD: 40 % (ref 22–41)
MACROCYTES BLD QL SMEAR: ABNORMAL
MANUAL DIFF BLD: NORMAL
MCH RBC QN AUTO: 31.9 PG (ref 27–33)
MCHC RBC AUTO-ENTMCNC: 31.1 G/DL (ref 33.7–35.3)
MCV RBC AUTO: 102.4 FL (ref 81.4–97.8)
MONOCYTES # BLD AUTO: 0.05 K/UL (ref 0–0.85)
MONOCYTES NFR BLD AUTO: 3.3 % (ref 0–13.4)
MORPHOLOGY BLD-IMP: NORMAL
NEUTROPHILS # BLD AUTO: 0.85 K/UL (ref 1.82–7.42)
NEUTROPHILS NFR BLD: 56.7 % (ref 44–72)
NRBC # BLD AUTO: 0.04 K/UL
NRBC BLD-RTO: 2.8 /100 WBC
PLATELET # BLD AUTO: 103 K/UL (ref 164–446)
PLATELET BLD QL SMEAR: NORMAL
PMV BLD AUTO: 10.8 FL (ref 9–12.9)
POTASSIUM SERPL-SCNC: 4.2 MMOL/L (ref 3.6–5.5)
PROT SERPL-MCNC: 5.1 G/DL (ref 6–8.2)
RBC # BLD AUTO: 2.07 M/UL (ref 4.7–6.1)
RBC BLD AUTO: PRESENT
SODIUM SERPL-SCNC: 136 MMOL/L (ref 135–145)
WBC # BLD AUTO: 1.5 K/UL (ref 4.8–10.8)

## 2023-05-05 PROCEDURE — 96411 CHEMO IV PUSH ADDL DRUG: CPT

## 2023-05-05 PROCEDURE — 96375 TX/PRO/DX INJ NEW DRUG ADDON: CPT

## 2023-05-05 PROCEDURE — 99215 OFFICE O/P EST HI 40 MIN: CPT | Performed by: PEDIATRICS

## 2023-05-05 PROCEDURE — 700111 HCHG RX REV CODE 636 W/ 250 OVERRIDE (IP): Mod: UD | Performed by: PEDIATRICS

## 2023-05-05 PROCEDURE — 80053 COMPREHEN METABOLIC PANEL: CPT

## 2023-05-05 PROCEDURE — 85025 COMPLETE CBC W/AUTO DIFF WBC: CPT

## 2023-05-05 PROCEDURE — 85007 BL SMEAR W/DIFF WBC COUNT: CPT

## 2023-05-05 PROCEDURE — 700105 HCHG RX REV CODE 258: Mod: UD | Performed by: PEDIATRICS

## 2023-05-05 PROCEDURE — A4212 NON CORING NEEDLE OR STYLET: HCPCS

## 2023-05-05 PROCEDURE — 96409 CHEMO IV PUSH SNGL DRUG: CPT

## 2023-05-05 PROCEDURE — 36591 DRAW BLOOD OFF VENOUS DEVICE: CPT

## 2023-05-05 RX ORDER — SULFAMETHOXAZOLE AND TRIMETHOPRIM 800; 160 MG/1; MG/1
2 TABLET ORAL
Qty: 48 TABLET | Refills: 3 | Status: SHIPPED | OUTPATIENT
Start: 2023-05-06 | End: 2023-06-05 | Stop reason: SDUPTHER

## 2023-05-05 RX ORDER — HEPARIN SODIUM,PORCINE 10 UNIT/ML
30 VIAL (ML) INTRAVENOUS PRN
Status: CANCELLED | OUTPATIENT
Start: 2023-05-05

## 2023-05-05 RX ORDER — 0.9 % SODIUM CHLORIDE 0.9 %
20 VIAL (ML) INJECTION PRN
Status: CANCELLED | OUTPATIENT
Start: 2023-05-05

## 2023-05-05 RX ORDER — ONDANSETRON 2 MG/ML
8 INJECTION INTRAMUSCULAR; INTRAVENOUS ONCE
Status: COMPLETED | OUTPATIENT
Start: 2023-05-05 | End: 2023-05-05

## 2023-05-05 RX ORDER — LORAZEPAM 1 MG/1
1 TABLET ORAL EVERY 4 HOURS PRN
Qty: 40 TABLET | Refills: 0 | Status: SHIPPED | OUTPATIENT
Start: 2023-05-05 | End: 2023-06-04

## 2023-05-05 RX ORDER — HEPARIN SODIUM (PORCINE) LOCK FLUSH IV SOLN 100 UNIT/ML 100 UNIT/ML
500 SOLUTION INTRAVENOUS PRN
Status: CANCELLED | OUTPATIENT
Start: 2023-05-05

## 2023-05-05 RX ADMIN — METHOTREXATE 138.5 MG: 25 INJECTION, SOLUTION INTRA-ARTERIAL; INTRAMUSCULAR; INTRATHECAL; INTRAVENOUS at 16:22

## 2023-05-05 RX ADMIN — HEPARIN 500 UNITS: 100 SYRINGE at 16:39

## 2023-05-05 RX ADMIN — ONDANSETRON 8 MG: 2 INJECTION INTRAMUSCULAR; INTRAVENOUS at 15:37

## 2023-05-05 RX ADMIN — VINCRISTINE SULFATE 2 MG: 1 INJECTION, SOLUTION INTRAVENOUS at 16:15

## 2023-05-05 ASSESSMENT — FIBROSIS 4 INDEX: FIB4 SCORE: 0.92

## 2023-05-05 NOTE — PROGRESS NOTES
"Pharmacy Chemotherapy Verification    Patient Name: Tomas Jean-Baptiste   Dx: pH+ B-Cell ALL         Protocol: Capizzi MTX IM (On Study Arm B, ID number: [id number])         Allergies:  Amoxicillin     /75   Pulse (!) 120   Temp 37.3 °C (99.2 °F) (Temporal)   Resp 20   Ht 1.658 m (5' 5.28\")   Wt 61.6 kg (135 lb 12.9 oz)   SpO2 100%   BMI 22.41 kg/m²    Body surface area is 1.68 meters squared.    Weight Type Height Weight BSA Additional Details   Treatment plan 165.1 cm 64.9 kg 1.73 m² Documented as of 2/27/2023      Labs 5/5/23  ANC~ 850 Plt = 103k   Hgb = 6.6     SCr = 0.4 mg/dL CrCl ~ >125 mL/min (minimum SCr of 0.7)   LFT's = 49/73/118 TBili = 0.3     Drug Order   (Drug name, dose, route, IV Fluid & volume, frequency, number of doses) Cycle: IM D41   Previous treatment: IM D31 (IV MTX held) on 4/28/23     Medication = Vincristine (ONCOVIN)   Base Dose= 1.5 mg/m2  Calc Dose: Base Dose x 1.68 m2 = 2.52 mg  Final Dose = 2 mg (MAX)   Route = IV  Fluid & Volume = NS 25 mL  Admin Duration = Over 5 - 10 minutes    Days 1,11,21,31,and 41       <10% difference, okay to treat with final dose   Medication = Methotrexate PF  Base Dose = 80 mg/m2 (Dose Reduction per Dr Faye)   Calc Dose:Base Dose x 1.68 m2 = 134.4 mg  Final Dose = 138.5 mg   Route = IV  Fluid & Volume = NS 50 mL  Admin Duration = Over 15 minutes  Days 1,11,21,31,and 41    <10% difference, okay to treat with final dose   By my signature below, I confirm this process was performed independently with the BSA and all final chemotherapy dosing calculations congruent. I have reviewed the above chemotherapy order and that my calculation of the final dose and BSA (when applicable) corroborate those calculations of the  pharmacist. Discrepancies of 10% or greater in the written dose have been addressed and documented within the [facility] Progress notes.    Tamir England, PharmD    "

## 2023-05-05 NOTE — PROGRESS NOTES
"Pharmacy Chemotherapy Calculations Note:    Dx: B-ALL  Cycle:  Interim Maintenance I Day 41   Previous treatment: IM D31 (IV mtx held) on 4/28/23     Protocol: Interim Maintenance per Arm B of [id number] [facility] ID number: [id number]     4/11/23: Mtx dose reduction today, 80 mg/m2, per Dr Faye     4/28/23: Hold IV mtx per Dr Faye       /75   Pulse (!) 120   Temp 37.3 °C (99.2 °F) (Temporal)   Resp 20   Ht 1.658 m (5' 5.28\")   Wt 61.6 kg (135 lb 12.9 oz)   SpO2 100%   BMI 22.41 kg/m²  Body surface area is 1.68 meters squared.    Labs from 5/5/23 reviewed - all within treatment parameters. Confirmed with Dr AGRCIA that we will give the same dose he got last time he made counts.       Vincristine 1.5 mg/m² (max 2 mg) x 1.68 m² = 2.52 mg   Max 2 mg, ok for final dose = 2 mg IV    Methotrexate 80 mg/m² x 1.68 m² = 134.4 mg   <10% difference, okay to treat with final dose = 138.5 mg IV        Judy Bryant, PharmD, BCOP            "

## 2023-05-05 NOTE — PROGRESS NOTES
"Pediatric Hematology / Oncology  Progress Note      Patient Name:  Tomas Jean-Baptiste  : 2000   MRN: [id number]    Location of Service:  [facility]  Date of Service: 2023  Time: 1:56 PM    Primary Care Physician: Margarito Arvizu M.D.    Protocol/Treatment Plan:    HISTORY OF PRESENT ILLNESS:     Chief Complaint: Here for chemotherapy.    History of Present Illness: Tomas Jean-Baptiste is a 22 y.o. male who presents to the [facility] for scheduled chemotherapy.  Today is Day 41 of Interim Maintenance 2 treatment as per Arm B of protocol [id number] for Ph+ Acute B-Lymphoblastic Leukemia.     Since his visit 3 days ago, JULY reports that he is \"about the same.\"  He still has fatigue and notes tachycardia/palpitations when he stands up.  His mother feels that he looks more pale but he doesn't currently feel the need for pRBC transfusion.    Only mild nausea; JULY has stopped his olanzapine (largely because his mother was worried about the medication) and takes occasional doses of Zofran.  He is asking for an Ativan Rx \"just in case I need it.\"    Review of Systems:     Constitutional: Afebrile. Good appetite.  HENT: Negative for nosebleeds and mouth sores.  Eyes: Negative for visual changes.  Respiratory: Negative for shortness of breath or cough.    Gastrointestinal: Negative for nausea, vomiting, abdominal pain, diarrhea, constipation and blood in stool.    Musculoskeletal: Left shoulder slowly better; \"weak all over.\".    Skin: Negative for rash, signs of infection.  Neurological: Negative for focal weakness or headaches; tingling in toes persists.    Endo/Heme/Allergies: Does not bruise/bleed easily.    Psychiatric/Behavioral: No changes in mood, appropriate for age.     PAST MEDICAL HISTORY:     Past Medical History:    Past Medical History:   Diagnosis Date    Cancer (HCC)     Leukoma        Past Surgical " History:     Past Surgical History:   Procedure Laterality Date    INCISION AND DRAINAGE GENERAL Left 2/17/2023    Procedure: INCISION AND DRAINAGE OF LEFT SHOULDER;  Surgeon: Luke Haddad M.D.;  Location: SURGERY [facility];  Service: General    CATH PLACEMENT Right 8/29/2022    Procedure: INSERTION, CATHETER;  Surgeon: Simona Moise M.D.;  Location: SURGERY [facility];  Service: Ent        Allergies:   Allergies as of 05/05/2023 - Reviewed 04/28/2023   Allergen Reaction Noted    Amoxicillin Rash 04/03/2020       Home Medications:    Current Outpatient Medications   Medication Sig Dispense Refill    apixaban (ELIQUIS) 2.5mg Tab Take 2.5 mg by mouth 2 times a day. (Patient not taking: Reported on 4/28/2023)      famotidine (PEPCID) 20 MG Tab Take 1 Tablet by mouth 2 times a day. 60 Tablet 1    OLANZapine (ZYPREXA) 5 MG Tab Take 1 Tablet by mouth every evening. (No longer taking) 30 Tablet 1    Melatonin 5 MG Cap Take 5 mg by mouth at bedtime as needed.      imatinib (GLEEVEC) 400 MG tablet Take 1 Tablet by mouth every day. Pt takes 200 mg + 400 mg for a total dose of 600 mg daily 30 Tablet 5    imatinib (GLEEVEC) 100 MG tablet Take 2 Tablets by mouth every day. Pt takes 200 mg + 400 mg for a total dose of 600 mg daily 60 Tablet 5    Omega-3 Fatty Acids (FISH OIL PO) Take 1 Capsule by mouth every day.      ondansetron (ZOFRAN ODT) 8 MG TABLET DISPERSIBLE Dissolve 1 Tablet by mouth every 6 hours as needed for Nausea/Vomiting. 10 Tablet 0    sulfamethoxazole-trimethoprim (BACTRIM DS) 800-160 MG tablet Take 2 Tablets by mouth in the morning every Sat and Sun.      vitamin D3 (CHOLECALCIFEROL) 1000 Unit (25 mcg) Tab Take 1 Tablet by mouth every day.      therapeutic multivitamin-minerals (THERAGRAN-M) Tab Take 1 Tablet by mouth every day.       No current facility-administered medications for this encounter.        Social History: Taking a break from school    Family History:     Family History   Problem  "Relation Age of Onset    No Known Problems Mother     Stroke Maternal Grandmother     Diabetes Paternal Grandfather     Hypertension Paternal Grandfather     Hyperlipidemia Paternal Grandfather     Cancer Neg Hx     Heart Disease Neg Hx          OBJECTIVE:     Vitals:   /75   Pulse (!) 120   Temp 37.3 °C (99.2 °F) (Temporal)   Resp 20   Ht 1.658 m (5' 5.28\")   Wt 61.6 kg (135 lb 12.9 oz)   SpO2 100%     Labs:     Latest Reference Range & Units 04/25/23 10:08 04/27/23 15:28 05/02/23 15:17 05/05/23 13:30   WBC 4.8 - 10.8 K/uL 1.0 (LL) 1.2 (LL) 1.1 (LL) 1.5 (LL)   RBC 4.70 - 6.10 M/uL 2.60 (L) 2.45 (L) 2.45 (L) 2.07 (L)   Hemoglobin 14.0 - 18.0 g/dL 8.1 (L) 7.7 (L) 7.8 (L) 6.6 (L)   Hematocrit 42.0 - 52.0 % 25.6 (L) 24.4 (L) 24.4 (L) 21.2 (L)   MCV 81.4 - 97.8 fL 98.5 (H) 99.6 (H) 99.6 (H) 102.4 (H)   MCH 27.0 - 33.0 pg 31.2 31.4 31.8 31.9   MCHC 33.7 - 35.3 g/dL 31.6 (L) 31.6 (L) 32.0 (L) 31.1 (L)   RDW 35.9 - 50.0 fL 75.6 (H) 77.1 (H) 70.0 (H) 72.4 (H)   Platelet Count 164 - 446 K/uL 44 (L) 66 (L) 126 (L) 103 (L)   MPV 9.0 - 12.9 fL 11.9 11.6 11.5 10.8   Neutrophils-Polys 44.00 - 72.00 % 42.90 (L) 32.50 (L) 50.00 56.70   Neutrophils (Absolute) 1.82 - 7.42 K/uL 0.43 (LL) 0.39 (LL) 0.55 (L) 0.85 (L)   Lymphocytes 22.00 - 41.00 % 49.50 (H) 46.50 (H) 44.00 (H) 40.00   Lymphs (Absolute) 1.00 - 4.80 K/uL 0.50 (L) 0.56 (L) 0.48 (L) 0.60 (L)   Monocytes 0.00 - 13.40 % 6.70 21.00 (H) 6.00 3.30   Basophils 0.00 - 1.80 % 0.90 0.00 0.00 0.00   Nucleated RBC /100 WBC 0.00 0.00 0.00 2.80       Physical Exam:    Constitutional: Well-developed, well-nourished, and in no distress.  Pale.  HENT: Normocephalic and atraumatic. No nasal congestion or rhinorrhea. Oropharynx is clear and moist. No oral ulcerations or sores.    Eyes: Conjunctivae are normal. Pupils are equal, round, and reactive to light. No scleral icterus.    Neck: Normal range of motion of neck, no adenopathy.    Cardiovascular: Normal rate, regular rhythm " "and normal heart sounds.  No murmur heard. Distal cap refill < 2 sec  Pulmonary/Chest: Effort normal and breath sounds normal.  Symmetric expansion.    Abdomen: Soft. Bowel sounds are normal. No distension and no mass. There is no hepatosplenomegaly.    Neurological: Alert and oriented to person and place. Exhibits normal muscle tone bilaterally in upper and lower extremities. Gait not assessed. Coordination grossly normal.    Skin: No rash or evidence of skin infection.  Psychiatric: Mood and affect normal for age.      ASSESSMENT AND PLAN:     Problem List Items Addressed This Visit       Encounter for chemotherapy management      Thi is Day 41 of Interim Maintenance 2.  Note that Day 31 was originally scheduled for April 21 but postponed because of myelosuppression (platelets 18k).  When JULY returned 4 days later, his ANC and platelet count remained below 500 and 50k, respectively.  At that point, protocol stipulated that vincristine and IT methotrexate could/should be given (holding I.V. methotrexate), but due to some confusion over this, those doses were not given until 3 days later (7 days ago).  I have elected to consider April 25 as the \"true\" Day 31, in which case today is Day 41.      ANC and platelets today are above threshold, so we will proceed with Day 41 chemotherapy.  I have reviewed the treatment protocol, specifically page 192 regarding chemotherapy adjustments for myelosuppression.  Unfortunately, given the complexity of JULY's situation (multiple previous delays), these guidelines are ambiguous.  The protocol states that the methotrexate dose should be increased by 50 mg/m² if platelets are greater than 75,000 and absolute neutrophil count is greater than 750, as is the case here.  The last dose of methotrexate we administered was 138.5 mg/m² on Day 21 (April 11) but the Day 31 dose was held altogether.  Thus, if I take the protocol literally, today's dose should be either 188.5 mg/m² or 50 mg/m² " (which is 50 more than zero and that seems inadequate).  Given the prolonged treatment delay (now 24 days without methotrexate) and after review with our pharmacist, I am electing not to escalate the dose but will instead repeat the previous dose of 138.5 mg.      Relevant Orders    CBC WITH DIFFERENTIAL    Comp Metabolic Panel    Myelosuppression after chemotherapy     Imatinib may be a contributing factor (and has been held previously).  Hemoglobin has dropped (apparently) over the last 3 days but JULY does not describe worsening symptoms.  We discussed possible red blood cell transfusion today, but elected instead to monitor.      Relevant Orders    CBC WITH DIFFERENTIAL    Comp Metabolic Panel     Research Participant:           [facility] - Source Data         Diagnosis: Ph+ Precursor B-Cell Acute Lymphoblastic Leukemia     Disease Status: EOI1A MRD NEGATIVE, EOI1B RD NEGATIVE, CNS3c, testicular negative, HSV1 IgG POSITIVE, CMV IgG NEGATIVE, VARICELLA IgG POSITIVE     Active Studies: [id number], [id number]                                                                                                      Inactive Studies: [id number]                                                                                                                                                Optional Studies: None             Protocol: International Phase 3 trial in [place] 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      Treatment Plan: [id number](OS), AR-Arm B, Interim Maintenance, Day 41     Height: 1.650 m      Weight: 64.9kg       BSA: 1.73 m²   (Interim Maintenance, Day 1 2/27/2023)                                                                                                                                           Performance Status: Karnofsky 70, ECOG  3 (3/9/2023)     Treatment Plan Medications:        Imatinib HELD 3/30/2023, restarted 4/11/2023     Evaluations / Study Labs:  (5/5/2023)      WBC 1.5, Hgb 6.6, platelets 103   ,      Therapy Given: (5/5/2023)  Vincristine 2 mg IV  Methotrexate 138.5 mg IV         I advised JULY to contact us for transfusion if any of his symptoms of anemia should worsen.  He should also self monitor for fever and or bleeding symptoms, as his blood counts may drop once again.    Unless there are new concerns, JULY will RTC on 5/22/2023 for Day 1 of Maintenance chemotherapy.  We reviewed the upcoming treatment schedule today and I provided JULY with a tentative treatment calendar.    More than 50% of today's 45-minute face-to-face visit was spent on counseling and coordination of care.    ANN MARIE Rodriguez MD  Pediatric Hematology / Oncology  [facility]  Cell.  [phone]  Office. [phone]

## 2023-05-05 NOTE — ADDENDUM NOTE
Encounter addended by: River Rodriguez M.D. on: 5/5/2023 4:01 PM   Actions taken: Clinical Note Signed

## 2023-05-05 NOTE — PROGRESS NOTES
Pt to [facility] for lab draw, doctors office visit, and chemotherapy administration.      Afebrile.  VSS.  Awake and alert in no acute distress.      Port accessed using a 22/g 3/4 inch trevino needle with 1 attempt.  Labs drawn from the port without difficulty.   Pt tolerated well.      Chemotherapy dosage calculated independently by Huong Foster, MONTSERRAT and Tammie Covington RN and compared to road map for protocol [id number].  Calculations within 10% of written order.  Lab results reviewed.      Premedications and chemo given as ordered, see MAR.  Blood return verified prior to, during, and after chemotherapy infusion.  See Chemotherapy flowsheet.  PT tolerated well.  No side effects or complications noted.  Port flushed per orders (see MAR) and de-accessed after completion. PT home with mom.  Will return for next visit on 05/22/23.

## 2023-05-08 ENCOUNTER — HOSPITAL ENCOUNTER (OUTPATIENT)
Dept: INFUSION CENTER | Facility: MEDICAL CENTER | Age: 23
End: 2023-05-08
Attending: PEDIATRICS
Payer: COMMERCIAL

## 2023-05-08 ENCOUNTER — PATIENT OUTREACH (OUTPATIENT)
Dept: PEDIATRIC HEMATOLOGY/ONCOLOGY | Facility: MEDICAL CENTER | Age: 23
End: 2023-05-08
Payer: MEDICAID

## 2023-05-08 VITALS
HEIGHT: 65 IN | WEIGHT: 131.61 LBS | SYSTOLIC BLOOD PRESSURE: 116 MMHG | HEART RATE: 86 BPM | OXYGEN SATURATION: 100 % | TEMPERATURE: 99.5 F | DIASTOLIC BLOOD PRESSURE: 70 MMHG | RESPIRATION RATE: 20 BRPM | BODY MASS INDEX: 21.93 KG/M2

## 2023-05-08 DIAGNOSIS — C91.01 ACUTE LYMPHOBLASTIC LEUKEMIA (ALL) IN REMISSION (HCC): ICD-10-CM

## 2023-05-08 DIAGNOSIS — T45.1X5A ANEMIA ASSOCIATED WITH CHEMOTHERAPY: ICD-10-CM

## 2023-05-08 DIAGNOSIS — C91.Z0 B LYMPHOBLASTIC LEUKEMIA WITH T(9;22)(Q34;Q11.2);BCR-ABL1 (HCC): ICD-10-CM

## 2023-05-08 DIAGNOSIS — D64.81 ANEMIA ASSOCIATED WITH CHEMOTHERAPY: ICD-10-CM

## 2023-05-08 PROBLEM — R11.2 NAUSEA AND VOMITING: Status: RESOLVED | Noted: 2023-04-16 | Resolved: 2023-05-08

## 2023-05-08 PROBLEM — C91.00 ACUTE LYMPHOBLASTIC LEUKEMIA (ALL) (HCC): Status: RESOLVED | Noted: 2022-11-30 | Resolved: 2023-05-08

## 2023-05-08 LAB
ABO GROUP BLD: NORMAL
ALBUMIN SERPL BCP-MCNC: 2.8 G/DL (ref 3.2–4.9)
ALBUMIN/GLOB SERPL: 1.3 G/DL
ALP SERPL-CCNC: 115 U/L (ref 30–99)
ALT SERPL-CCNC: 85 U/L (ref 2–50)
ANION GAP SERPL CALC-SCNC: 11 MMOL/L (ref 7–16)
ANISOCYTOSIS BLD QL SMEAR: ABNORMAL
AST SERPL-CCNC: 56 U/L (ref 12–45)
BARCODED ABORH UBTYP: 5100
BARCODED PRD CODE UBPRD: NORMAL
BARCODED UNIT NUM UBUNT: NORMAL
BASOPHILS # BLD AUTO: 0 % (ref 0–1.8)
BASOPHILS # BLD: 0 K/UL (ref 0–0.12)
BILIRUB CONJ SERPL-MCNC: <0.2 MG/DL (ref 0.1–0.5)
BILIRUB INDIRECT SERPL-MCNC: NORMAL MG/DL (ref 0–1)
BILIRUB SERPL-MCNC: 0.4 MG/DL (ref 0.1–1.5)
BLD GP AB SCN SERPL QL: NORMAL
BUN SERPL-MCNC: 11 MG/DL (ref 8–22)
CALCIUM ALBUM COR SERPL-MCNC: 8.3 MG/DL (ref 8.5–10.5)
CALCIUM SERPL-MCNC: 7.3 MG/DL (ref 8.5–10.5)
CHLORIDE SERPL-SCNC: 106 MMOL/L (ref 96–112)
CO2 SERPL-SCNC: 20 MMOL/L (ref 20–33)
COMPONENT R 8504R: NORMAL
CREAT SERPL-MCNC: 0.48 MG/DL (ref 0.5–1.4)
EOSINOPHIL # BLD AUTO: 0 K/UL (ref 0–0.51)
EOSINOPHIL NFR BLD: 0 % (ref 0–6.9)
ERYTHROCYTE [DISTWIDTH] IN BLOOD BY AUTOMATED COUNT: 69.2 FL (ref 35.9–50)
GFR SERPLBLD CREATININE-BSD FMLA CKD-EPI: 150 ML/MIN/1.73 M 2
GLOBULIN SER CALC-MCNC: 2.2 G/DL (ref 1.9–3.5)
GLUCOSE SERPL-MCNC: 94 MG/DL (ref 65–99)
HCT VFR BLD AUTO: 19.1 % (ref 42–52)
HGB BLD-MCNC: 6.1 G/DL (ref 14–18)
LYMPHOCYTES # BLD AUTO: 0.24 K/UL (ref 1–4.8)
LYMPHOCYTES NFR BLD: 16 % (ref 22–41)
MACROCYTES BLD QL SMEAR: ABNORMAL
MANUAL DIFF BLD: NORMAL
MCH RBC QN AUTO: 32.3 PG (ref 27–33)
MCHC RBC AUTO-ENTMCNC: 31.9 G/DL (ref 33.7–35.3)
MCV RBC AUTO: 101.1 FL (ref 81.4–97.8)
MONOCYTES # BLD AUTO: 0 K/UL (ref 0–0.85)
MONOCYTES NFR BLD AUTO: 0 % (ref 0–13.4)
MORPHOLOGY BLD-IMP: NORMAL
NEUTROPHILS # BLD AUTO: 1.26 K/UL (ref 1.82–7.42)
NEUTROPHILS NFR BLD: 84 % (ref 44–72)
NRBC # BLD AUTO: 0 K/UL
NRBC BLD-RTO: 0 /100 WBC
PLATELET # BLD AUTO: 111 K/UL (ref 164–446)
PLATELET BLD QL SMEAR: NORMAL
PMV BLD AUTO: 10.8 FL (ref 9–12.9)
POTASSIUM SERPL-SCNC: 3.7 MMOL/L (ref 3.6–5.5)
PRODUCT TYPE UPROD: NORMAL
PROT SERPL-MCNC: 5 G/DL (ref 6–8.2)
RBC # BLD AUTO: 1.89 M/UL (ref 4.7–6.1)
RBC BLD AUTO: PRESENT
RH BLD: NORMAL
SODIUM SERPL-SCNC: 137 MMOL/L (ref 135–145)
UNIT STATUS USTAT: NORMAL
WBC # BLD AUTO: 1.5 K/UL (ref 4.8–10.8)

## 2023-05-08 PROCEDURE — 86923 COMPATIBILITY TEST ELECTRIC: CPT

## 2023-05-08 PROCEDURE — 85007 BL SMEAR W/DIFF WBC COUNT: CPT

## 2023-05-08 PROCEDURE — 99214 OFFICE O/P EST MOD 30 MIN: CPT | Performed by: PEDIATRICS

## 2023-05-08 PROCEDURE — 36430 TRANSFUSION BLD/BLD COMPNT: CPT

## 2023-05-08 PROCEDURE — 82248 BILIRUBIN DIRECT: CPT

## 2023-05-08 PROCEDURE — 85025 COMPLETE CBC W/AUTO DIFF WBC: CPT

## 2023-05-08 PROCEDURE — 306780 HCHG STAT FOR TRANSFUSION PER CASE

## 2023-05-08 PROCEDURE — P9016 RBC LEUKOCYTES REDUCED: HCPCS

## 2023-05-08 PROCEDURE — 36591 DRAW BLOOD OFF VENOUS DEVICE: CPT

## 2023-05-08 PROCEDURE — 80053 COMPREHEN METABOLIC PANEL: CPT

## 2023-05-08 PROCEDURE — 86850 RBC ANTIBODY SCREEN: CPT

## 2023-05-08 PROCEDURE — A4212 NON CORING NEEDLE OR STYLET: HCPCS

## 2023-05-08 PROCEDURE — 700111 HCHG RX REV CODE 636 W/ 250 OVERRIDE (IP): Mod: UD | Performed by: PEDIATRICS

## 2023-05-08 PROCEDURE — 86900 BLOOD TYPING SEROLOGIC ABO: CPT

## 2023-05-08 PROCEDURE — 86901 BLOOD TYPING SEROLOGIC RH(D): CPT

## 2023-05-08 PROCEDURE — 86945 BLOOD PRODUCT/IRRADIATION: CPT

## 2023-05-08 RX ORDER — DIPHENHYDRAMINE HYDROCHLORIDE 50 MG/ML
25 INJECTION INTRAMUSCULAR; INTRAVENOUS PRN
Status: CANCELLED | OUTPATIENT
Start: 2023-05-08

## 2023-05-08 RX ORDER — 0.9 % SODIUM CHLORIDE 0.9 %
20 VIAL (ML) INJECTION PRN
Status: CANCELLED | OUTPATIENT
Start: 2023-05-08

## 2023-05-08 RX ORDER — DIPHENHYDRAMINE HYDROCHLORIDE 50 MG/ML
25 INJECTION INTRAMUSCULAR; INTRAVENOUS PRN
Status: DISCONTINUED | OUTPATIENT
Start: 2023-05-08 | End: 2023-05-09 | Stop reason: HOSPADM

## 2023-05-08 RX ORDER — HEPARIN SODIUM,PORCINE 10 UNIT/ML
30 VIAL (ML) INTRAVENOUS PRN
Status: CANCELLED | OUTPATIENT
Start: 2023-05-08

## 2023-05-08 RX ORDER — ACETAMINOPHEN 325 MG/1
650 TABLET ORAL PRN
Status: DISCONTINUED | OUTPATIENT
Start: 2023-05-08 | End: 2023-05-09 | Stop reason: HOSPADM

## 2023-05-08 RX ORDER — HEPARIN SODIUM (PORCINE) LOCK FLUSH IV SOLN 100 UNIT/ML 100 UNIT/ML
500 SOLUTION INTRAVENOUS PRN
Status: CANCELLED | OUTPATIENT
Start: 2023-05-08

## 2023-05-08 RX ORDER — ACETAMINOPHEN 325 MG/1
650 TABLET ORAL PRN
Status: CANCELLED | OUTPATIENT
Start: 2023-05-08

## 2023-05-08 RX ADMIN — HEPARIN 500 UNITS: 100 SYRINGE at 14:35

## 2023-05-08 ASSESSMENT — FIBROSIS 4 INDEX: FIB4 SCORE: 1.17

## 2023-05-08 NOTE — PROGRESS NOTES
PT to [facility] for PRBC  transfusion.  Afebrile.  VSS. Port accessed using a 22g 3/4 inch trevino needle with 1 attempt and labs drawn.   PT tolerated well.     Hgb 6.1.  OK to proceed with transfusion per DR Duenas.    Transfusion consent signed on 05/08/23    PRBC: Donor # [id number] started at 1225    Total volume infused:401.8    Vital signs monitored per protocol.  Transfusion completed at 1432 and PT tolerated well.  Port flushed per orders (see MAR) and de-accessed.   Next appointment scheduled on 05/22/23.

## 2023-05-08 NOTE — PROGRESS NOTES
Medical Social Work    SW met with patient during appointment with [facility]. Patient receiving blood transfusion, and reports he is already noticing a difference in how he is feeling.     Patient states all is going well at this time, and has no requests or needs at this time. Patient is currently on pause with his degree, and plans to resume his classes in the fall.    Plan: [facility] will continue to support and follow.

## 2023-05-08 NOTE — PROGRESS NOTES
Pediatric Hematology/Oncology   Progress Note      Patient Name:  Tomas Jean-Baptiste  : 2000   MRN: [id number]    Location of Service: [facility]  Date of Service: 2023  Time: 11:20 AM    Primary Care Physician: Margarito Arvizu M.D.    Protocol/Treatment Plan:  [place] Chromosome Precursor B-Cell Acute Lymphoblastic Leukemia, ON STUDY [id number], s/p completion of Interim Maintenance II, Arm B with capizzi MTx    HISTORY OF PRESENT ILLNESS:     Chief Complaint: Palpitations, Fatigue     History of Present Illness: Tomas Jean-Baptiste is a 22 y.o. young man who presents to the [facility] with complaints of palpitations and feeling fatigued.  Tomas Roy presents by himself today and provides an accurate clinical and interval history.      Tomas was seen on 2023 for Day 41 of Interim Maintenance 2 treatment. At that time, he had complained of fatigue and palpitations when he stood up. His mother had also mentioned that he was looking pale. His hemoglobin was noted to be 6.6 gm/dL that day. Tomas however declined the need for pRBCs transfusion.    Today, he reports worsening of palpitations and fatigue. Denies any syncope or dizziness. No reports of fever or interim illnesses. No chills or body aches. No nausea , vomiting or abdominal pain. Tomas reports that he has stopped taking Zyprexa for nausea and using only Zofran or ativan as needed. Stooling and voiding well. Continues with tingling and numbness in his toes which is although much improved from before. No change in gait, hoarseness of voice. No neurologic changes. No easy bruising or rash.     Taking Imatinib 600 mg daily, Bactrim BID SS, Pepcid BID, Zofran PRN, Ativan PRN, Melatonin for sleep and his vitamins. Reports 100% compliance with with Imatinib and Bactrim.    Review of Systems:     Constitutional: Afebrile.  Without recent illness.   Fatigued.  HENT: Negative for ear pain, nasal congestion or rhinorrhea, nosebleeds and sore throat.  No mouth sores.  Eyes: Negative for visual changes.  Respiratory: Negative for shortness of breath or noisy breathing.   Cardiovascular: Positive for palpitations especially when standing up  Gastrointestinal: Negative for nausea, vomiting, abdominal pain, diarrhea, constipation or blood in stool.    Genitourinary: Negative for painful urination, blood in urine or flank pain.    Musculoskeletal: Negative for joint or muscle pains.    Skin: Negative for rash, signs of infection.  Neurological: Negative for sensory changes, weakness or headaches. Positive for numbness, tingling along his toes, improving  Endo/Heme/Allergies: Does not bruise/bleed easily.    Psychiatric/Behavioral: No changes in mood, appropriate for age.     PAST MEDICAL HISTORY:     Oncologic History:  2-3 week history of worsening fatigue, right lower extremity pain  Presentation to OSH and diagnosed with right LE superficial thrombus, subsegmental PE and hyperleukocytosis, anemia and thrombocytopenia  Transferred to [facility] for definitive care  Presenting (local) WBC > 440K, Hgb 10.0, platelets 53, (automated differential ANC 3190, ALC 75,310, absolute monocyte count 54500, absolute blast count 340,560)  Uric Acid 15.6, phosphorus 5.6, LDH 1114  Rasburicase x 1 dose given   Peripheral Blood flow cytometry demonstrating CD10 pos, CD19 pos, CD20 neg, CD22 dim (60%) 5/28/2022  Peripheral blood FISH for BCR-ABL1 positive in 98% of analyzed cells     Age at Diagnosis: 21 years  White Blood Cell Count at Presentation: > 440 k/uL  Testicular Disease Status: Negative (see procedure note 5/30/2022)  CNS Status: 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, diagnostic LP with WBC 1, RBC 3 and no evidence of leukemic blasts 5/30/2022  Steroid Pre-treatment: None  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 5/28/2022     All inclusion/exclusion criteria for [id number] met and consent signed - enrolled 5/29/2022   All inclusion/exclusion criteria for [id number] met and consent signed - enrolled 5/30/2022  Confirmatory bone marrow aspirate and biopsy and diagnostic LP + cytarabine 70 mg IT 5/30/2022  Induction therapy (ON STUDY [id number]) started 5/30/2022  Bone marrow immunohistochemistry consistent with diagnosis of B-ALL comprising 90% of marrow cellularity  Bone marrow sample sent to [facility] for [facility] purposes:  Flow cytom  Of the blood pressure little high that is a problem is a cultural problem is well and cultural genetic and everything else like that unfortunately breathalyzers such bad heart disease diabetes things like that  populations etry consistent with peripheral blood, cytogenetics remarkable for known t(9;22)  CSF with WBC 1, RBC 3, no blasts identified on cytospin  FISH results available 5/31/2022 making patient eligible for transfer from [place] [address] to [place] [address] as eligibility requirements were met for [place] [address]  Patient unenrolled from [place] [address] (BCR-ABL1 fusion positive) 6/1/2022  Consent signed for [place] [address] and patient enrolled 6/1/2022 (see eligibility checklist from 5/31/2022 and consent note from 6/1/2022)  Imatinib 400 mg PO QAM / 200 mg PO QPM started 6/3/2022 (allowed per [place] [address])  Patient completed the first 15 days of a Standard 4-drug Induction on 6/13/2022  Start of [facility] [id number](OS), Induction IA Part 2, Day 15 6/13/2022  End of Induction 1A Part 2 - MRD negative  Start of [facility] [id number](OS), Induction IA Part 2, Day 15 7/5/2022  Induction IB DELAYED 2 weeks 14 days from 7/26/2022-8/9/2022) for myelosuppression - Start of Day 22 cytarabine block 8/9/2022  Induction IB Day 42 delayed 9 days for myelosuppression - Day 42 evaluations 9/7/2022  End of Induction IB - Flow cytometric MRD negative, MRD by IgH-TCR PCR [id number]%  Randomization to AR-Experimental Arm B of [id number]  Start  of AR-Experimental Arm B, Interim Maintenance 9/29/2022  Weight based increase in dose of imatinib to 400 mg PO AM and 250 mg PM 9/29/2022  Thrombocytopenia not permissive of proceeding with Day 15 of Interim Maintenance  AR-Experimental Arm B, Interim Maintenance, Day 15 delayed 4 days, start 10/17/2022  AR-Experimental Arm B, Interim Maintenance, Day 29, start 11/1/2022  AR-Experimental Arm B, Interim Maintenance, Day 43, start 11/14/2022  Last does of 6-MP 11/27/2022  AR-Experimental Arm B, Delayed Intensification, Day 1, start 12/6/2022  Admission with Severe Septic Shock 12/27/2022  Imatinib HELD 12/27/2022-1/8/2023  AR-Experimental Arm B, Delayed Intensification, Day 29 DELAYED 14 days with start 1/17/2023  Hospitalization 2/7/2023 on Day 50 of Delayed Intensification with left shoulder pain ultimately diagnosed with Bacteroides fragilis infection  AR-Experimental Arm B, Interim Maintenance, Day 1 DELAYED 7 days with start 2/27/2023  AR-Experimental Arm B, Interim Maintenance, Day 11 DELAYED 4 days for platelets 43K on 3/9/2023  AR-Experimental Arm B, Interim Maintenance, Day 11 VCR given and MTX omitted for platelets 43K 3/13/2023  Imatinib HELD 3/30/2023-4/11/2023  AR-Experimental Arm B, Interim Maintenance, Day 21 (DELAYED 22 DAYS) - administered 4/11/2023 with methotrexate 80 mg/m² IV  AR-Experimental Arm B, Interim Maintenance, Day 31 (DELAYED 7 DAYS) - vincristine and IT methotrexate to be given 4/28/2023 with recheck of counts at 10 days of delay 5/2/2023 to evaluate for whether IV methotrexate can be given     Past Medical History:    1) Previously Healthy  2) 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  3) Hyperleukocytosis  4) Hyperuricemia  5) Hyperphosphatemia  6) Right Lower Extremity Superficial Thrombus  7) Subsegmental Pulmonary Embolism  8) 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  9) Bacteroides fragilis soft tissue infection left shoulder     Past Surgical History:     1) Temporary Right IJ  Pharesis Catheter Placement 5/28/2022  2) Right-sided Port-A-Cath placement 8/29/2022  3) IR drainage left calf hematoma  4) Left shoulder I&D     Birth/Developmental History:   1st of three children  Unremarkable pregnancy  Unremarkable delivery     Allergies:             Allergies as of 05/27/2022 - Reviewed 05/27/2022   Allergen Reaction Noted    Amoxicillin   04/03/2020      Social History:   Lives at home with mother, brother and sister.  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   Two dogs.  Everyone is well in the house. Father not involved.     Family History:     No significant family history of cancer.  Both maternal and paternal family history of diabetes.     Immunizations:  UTD       Allergies:   Allergies as of 05/08/2023 - Reviewed 05/05/2023   Allergen Reaction Noted    Amoxicillin Rash 04/03/2020         Medications:   Current Outpatient Medications on File Prior to Encounter   Medication Sig Dispense Refill    sulfamethoxazole-trimethoprim (BACTRIM DS) 800-160 MG tablet Take 2 Tablets by mouth in the morning every Sat and Sun. 48 Tablet 3    LORazepam (ATIVAN) 1 MG Tab Take 1 Tablet by mouth every four hours as needed (Nausea) for up to 30 days. 40 Tablet 0    famotidine (PEPCID) 20 MG Tab Take 1 Tablet by mouth 2 times a day. 60 Tablet 1    Melatonin 5 MG Cap Take 5 mg by mouth at bedtime as needed.      imatinib (GLEEVEC) 400 MG tablet Take 1 Tablet by mouth every day. Pt takes 200 mg + 400 mg for a total dose of 600 mg daily 30 Tablet 5    imatinib (GLEEVEC) 100 MG tablet Take 2 Tablets by mouth every day. Pt takes 200 mg + 400 mg for a total dose of 600 mg daily 60 Tablet 5    ondansetron (ZOFRAN ODT) 8 MG TABLET DISPERSIBLE Dissolve 1 Tablet by mouth every 6 hours as needed for Nausea/Vomiting. 10 Tablet 0    Omega-3 Fatty Acids (FISH OIL PO) Take 1 Capsule by mouth every day. (Patient not taking: Reported on 5/8/2023)      vitamin D3 (CHOLECALCIFEROL) 1000 Unit (25 mcg) Tab Take 1 Tablet by mouth every day.  "(Patient not taking: Reported on 5/8/2023)      therapeutic multivitamin-minerals (THERAGRAN-M) Tab Take 1 Tablet by mouth every day. (Patient not taking: Reported on 5/8/2023)             OBJECTIVE:     Vitals:   /69   Pulse (!) 104   Temp 37.5 °C (99.5 °F) (Temporal)   Resp 20   Ht 1.658 m (5' 5.28\")   Wt 59.7 kg (131 lb 9.8 oz)     Labs:     Latest Reference Range & Units 05/08/23 10:54   WBC 4.8 - 10.8 K/uL 1.5 (LL)   RBC 4.70 - 6.10 M/uL 1.89 (L)   Hemoglobin 14.0 - 18.0 g/dL 6.1 (L)   Hematocrit 42.0 - 52.0 % 19.1 (L)   MCV 81.4 - 97.8 fL 101.1 (H)   MCH 27.0 - 33.0 pg 32.3   MCHC 33.7 - 35.3 g/dL 31.9 (L)   RDW 35.9 - 50.0 fL 69.2 (H)   Platelet Count 164 - 446 K/uL 111 (L)   MPV 9.0 - 12.9 fL 10.8   Neutrophils-Polys 44.00 - 72.00 % 84.00 (H)   Neutrophils (Absolute) 1.82 - 7.42 K/uL 1.26 (L)   Lymphocytes 22.00 - 41.00 % 16.00 (L)   Lymphs (Absolute) 1.00 - 4.80 K/uL 0.24 (L)   Monocytes 0.00 - 13.40 % 0.00   Monos (Absolute) 0.00 - 0.85 K/uL 0.00   Eosinophils 0.00 - 6.90 % 0.00   Eos (Absolute) 0.00 - 0.51 K/uL 0.00   Basophils 0.00 - 1.80 % 0.00   Baso (Absolute) 0.00 - 0.12 K/uL 0.00   Nucleated RBC /100 WBC 0.00   NRBC (Absolute) K/uL 0.00   Plt Estimation  Decreased   RBC Morphology  Present   Anisocytosis  2+ !   Macrocytosis  1+   Peripheral Smear Review  see below   Manual Diff Status  PERFORMED   Sodium 135 - 145 mmol/L 137   Potassium 3.6 - 5.5 mmol/L 3.7   Chloride 96 - 112 mmol/L 106   Co2 20 - 33 mmol/L 20   Anion Gap 7.0 - 16.0  11.0   Glucose 65 - 99 mg/dL 94   Bun 8 - 22 mg/dL 11   Creatinine 0.50 - 1.40 mg/dL 0.48 (L)   GFR (CKD-EPI) >60 mL/min/1.73 m 2 150   Calcium 8.5 - 10.5 mg/dL 7.3 (L)   Correct Calcium 8.5 - 10.5 mg/dL 8.3 (L)   AST(SGOT) 12 - 45 U/L 56 (H)   ALT(SGPT) 2 - 50 U/L 85 (H)   Alkaline Phosphatase 30 - 99 U/L 115 (H)   Total Bilirubin 0.1 - 1.5 mg/dL 0.4   Direct Bilirubin 0.1 - 0.5 mg/dL <0.2   Indirect Bilirubin 0.0 - 1.0 mg/dL see below   Albumin 3.2 - 4.9 " g/dL 2.8 (L)   Total Protein 6.0 - 8.2 g/dL 5.0 (L)   Globulin 1.9 - 3.5 g/dL 2.2   A-G Ratio g/dL 1.3       Physical Exam:    Constitutional: Well appearing and in no distress. Appears tired.  HENT: Normocephalic and atraumatic. Alopecia. Wearing glasses.No nasal congestion or rhinorrhea. Oropharynx is clear and moist. No oral ulcerations or sores.    Eyes: Conjunctivae are normal. Pupils are equal, round, and reactive to light.    Neck: Normal range of motion of neck, no adenopathy.    Cardiovascular: Tachycardic, regular rhythm and normal heart sounds.  No murmur heard. DP/radial pulses 2+, cap refill < 2 sec  Pulmonary/Chest: Effort normal and breath sounds normal. No respiratory distress. Symmetric expansion.  No crackles or wheezes. PAC to R chest wall, accessed.  Abdomen: Soft. Bowel sounds are normal. No distension and no mass. There is no hepatosplenomegaly.   Genitourinary:  Deferred  Musculoskeletal: Normal range of motion of lower and upper extremities bilaterally. No tenderness to palpation of elbows, wrists, hands, knees, ankles and feet bilaterally.   Neurological: Alert and oriented to person and place. Exhibits normal muscle tone bilaterally in upper and lower extremities. Gait normal. Coordination normal.    Skin: Skin is warm, dry and pink.  No rash or evidence of skin infection.  No pallor.   Psychiatric: Mood and affect normal for age.    ASSESSMENT AND PLAN:     Tomas Jean-Baptiste is a 22 y.o. young man who presents to the [facility] with complaints of palpitations and feeling fatigued.  His hemoglobin today is 6.1 gm/dL which has trended down further from 3 days ago. Will transfuse pRBCs today.     Ph+ Precursor B-Cell Acute Lymphoblastic Leukemia, in MRD Remission:  - 2-3 weeks of symptoms  - Presenting WBC > 440 k/uL, hyperleukocytosis  - Start of Hydroxyurea (1500 mg PO Q8) 2320 on 5/27/2022  - discontinued after only 55 hours  - No  steroid pretreatment  - 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  - Testicular status NEGATIVE       - Flow cytometry of both 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, FISH positive for BCR-ABL1 translocation  - Enrolled on [facility] [id number]  - Initially enrolled on [facility] [id number] - but 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                            - Enrolled on [facility] [id number] and began study 6/13/2022              - Started imatinib therapy 6/3/2022   - End of Induction IA and IB MRD negative  - Imatinib dose increased to 400 mg PO AM and 250 mg PM 9/29/2022  -* Note:  All imatinib doses given inpatient rounded down to 600 mg  - Imatinib held for Septic Shock 12/27/2022-1/8/2023  - Imatinib 600 mg PO daily restarted 1/8/2023 inpatient  - Imatinib 400 mg PO QAM and 250 mg PO QHS 1/14/2023-1/17/2023  - Imatinib 600 mg PO QAM 1/17/2023 - 3/30/2023            [id number], AR Arm B, S/p Interim Maintenance II   ** Continue imatinib 600 mg PO daily      ** RTC on 5/23/2023 for start of maintenance cycle 1       2) Soft Tissue Infection / Joint Infection with Bacteroides fragilis:  - Edema and pain continue to improve  - Was initially on cefepime for F&N, added IV Vancomycin on 2/10/2023, discontinued both cefepime and vancomycin on 2/20/2023 after start of IV Flagyl (below)  - S/P open exploration/drainage 2/17/2023  - Culture: B.fragilis (both deep and superficial cultures)   - ID Consultation with Dr. Singh - recommendation for switch to metronidazole for 4+ weeks  - Flagyl DISCONTINUED 3/27/2023 after 5 weeks of therapy   - Neuropathies stable     3) Left Shoulder Pain (RESOLVED):     4) Chemotherapy Related Pancytopenia: Counts improving  - WBC 1500/microliters, Hgb 6.1 gm/dL, platelets 111,000/microliters  - ANC 1260/microliters, /microliters  - Transfuse for hemoglobin less than 7.5  gm/dL or symptomatic  - Transfuse for platelets less than 10,000/microliters or  symptomatic  - Will transfuse 1 unit of pRBCs today     5) Tachycardia: currently worse due to anemia  - Previous cardiac work-up to include echocardiogram as well as telemetry  - Will transfuse pRBCs today     7) History of Peptic Ulcer Disease/Bleeding:  - Taking Pepcid BID     8) Poor Appetite / Nausea (IMPROVED to RESOLVED)  - Reports that nausea has almost resolved, improved appetite         - Stopped taking Zyprexa. Currently has Zofran and Ativan PRN     9) At Risk of Opportunistic Lung Infection:  - Bactrim DS PO BID Sat and Sun for PJP prophylaxis     10) Central Access:   - R Port-A-Cath in place      Research Participant:              [facility] - Source Data         Diagnosis: Ph+ Precursor B-Cell Acute Lymphoblastic Leukemia     Disease Status: EOI1A MRD NEGATIVE, EOI1B RD NEGATIVE, CNS3c, testicular negative, HSV1 IgG POSITIVE, CMV IgG NEGATIVE, VARICELLA IgG POSITIVE     Active Studies: [id number], [id number]                                                                                                      Inactive Studies: [id number]                                                                                                                                                Optional Studies: None             Protocol: International Phase 3 trial in [place] 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      Treatment Plan: [id number](OS), AR-Arm B, s/p Interim Maintenance II     Height: 1.650 m      Weight: 64.9kg       BSA: 1.73 m²   (Interim Maintenance, Day 1 2/27/2023)                                                                                                                                           Performance Status: Karnofsky 80, ECOG 2 (4/25/2023)     Treatment Plan Medications:  (100% adherent with imatinib)     Imatinib 600 mg QAM     Evaluations / Study Labs:       None      Therapy Given:     None          Disposition: Return to [facility] on 5/23/2023 for start of Maintenance Cycle 1. He tolerated pRBCs transfusion well and felt much improved.      Alyce Duenas MD  Pediatric Hematology / Oncology  [facility]  Cell.  [phone]  Office. [phone]

## 2023-05-09 ENCOUNTER — TELEPHONE (OUTPATIENT)
Dept: INFUSION CENTER | Facility: MEDICAL CENTER | Age: 23
End: 2023-05-09
Payer: MEDICAID

## 2023-05-09 NOTE — TELEPHONE ENCOUNTER
Discharge phone call completed. Pt states ge is feeling much better. Pt encouraged to reach out for any concerns, new or worsening symptoms.

## 2023-05-21 NOTE — PROGRESS NOTES
"Pharmacy Chemotherapy Verification    Patient Name: Tomas Jean-Baptiste   Dx: pH+ B-Cell ALL         Protocol: Capizzi MTX IM (On Study Arm B, ID number: [id number])         Allergies:  Amoxicillin     Pulse 88   Temp 37.4 °C (99.3 °F) (Temporal)   Resp 20   Ht 1.667 m (5' 5.63\")   Wt 62.3 kg (137 lb 5.6 oz)   SpO2 98%   BMI 22.42 kg/m²    Body surface area is 1.7 meters squared.    All lab results 5/22/23 within treatment parameters.     Drug Order   (Drug name, dose, route, IV Fluid & volume, frequency, number of doses) Cycle: 1 maintenance D1  Previous treatment: IM D41 5/5/23     Medication = Vincristine (ONCOVIN)   Base Dose= 1.5 mg/m2  Calc Dose: Base Dose x 1.71m2 = >2mg  Final Dose = 2 mg (MAX)   Route = IV  Fluid & Volume = NS 25 mL  Admin Duration = Over 5 - 10 minutes    Days 1, 29, 57      <10% difference, okay to treat with final max dose   Medication = Methotrexate PF  Base Dose = 12mg fixed dose  Calc Dose: n/a  Final Dose = 12mg   Route = INTRATHECAL  Fluid & Volume = PFNS 6mL  Admin Duration = IT per MD Days 1 and 29  No calculation required.   okay to treat with final dose   By my signature below, I confirm this process was performed independently with the BSA and all final chemotherapy dosing calculations congruent. I have reviewed the above chemotherapy order and that my calculation of the final dose and BSA (when applicable) corroborate those calculations of the  pharmacist. Discrepancies of 10% or greater in the written dose have been addressed and documented within the [facility] Progress notes.    Galen Wilson, PharmD  "

## 2023-05-22 ENCOUNTER — TELEPHONE (OUTPATIENT)
Dept: PEDIATRIC HEMATOLOGY/ONCOLOGY | Facility: MEDICAL CENTER | Age: 23
End: 2023-05-22
Payer: MEDICAID

## 2023-05-22 ENCOUNTER — PATIENT OUTREACH (OUTPATIENT)
Dept: PEDIATRIC HEMATOLOGY/ONCOLOGY | Facility: MEDICAL CENTER | Age: 23
End: 2023-05-22
Payer: COMMERCIAL

## 2023-05-22 ENCOUNTER — PHARMACY VISIT (OUTPATIENT)
Dept: PHARMACY | Facility: MEDICAL CENTER | Age: 23
End: 2023-05-22
Payer: COMMERCIAL

## 2023-05-22 ENCOUNTER — HOSPITAL ENCOUNTER (OUTPATIENT)
Dept: INFUSION CENTER | Facility: MEDICAL CENTER | Age: 23
End: 2023-05-22
Attending: PEDIATRICS
Payer: COMMERCIAL

## 2023-05-22 VITALS
WEIGHT: 137.35 LBS | DIASTOLIC BLOOD PRESSURE: 73 MMHG | OXYGEN SATURATION: 99 % | HEIGHT: 66 IN | TEMPERATURE: 99.7 F | SYSTOLIC BLOOD PRESSURE: 114 MMHG | HEART RATE: 95 BPM | BODY MASS INDEX: 22.07 KG/M2 | RESPIRATION RATE: 16 BRPM

## 2023-05-22 DIAGNOSIS — C91.Z0 B LYMPHOBLASTIC LEUKEMIA WITH T(9;22)(Q34;Q11.2);BCR-ABL1 (HCC): ICD-10-CM

## 2023-05-22 DIAGNOSIS — C91.01 ACUTE LYMPHOBLASTIC LEUKEMIA (ALL) IN REMISSION (HCC): ICD-10-CM

## 2023-05-22 DIAGNOSIS — Z51.11 ENCOUNTER FOR CHEMOTHERAPY MANAGEMENT: ICD-10-CM

## 2023-05-22 LAB
ALBUMIN SERPL BCP-MCNC: 3.7 G/DL (ref 3.2–4.9)
ALBUMIN/GLOB SERPL: 1.5 G/DL
ALP SERPL-CCNC: 85 U/L (ref 30–99)
ALT SERPL-CCNC: 60 U/L (ref 2–50)
ANION GAP SERPL CALC-SCNC: 11 MMOL/L (ref 7–16)
ANISOCYTOSIS BLD QL SMEAR: ABNORMAL
AST SERPL-CCNC: 32 U/L (ref 12–45)
BASOPHILS # BLD AUTO: 0 % (ref 0–1.8)
BASOPHILS # BLD: 0 K/UL (ref 0–0.12)
BILIRUB CONJ SERPL-MCNC: <0.2 MG/DL (ref 0.1–0.5)
BILIRUB INDIRECT SERPL-MCNC: NORMAL MG/DL (ref 0–1)
BILIRUB SERPL-MCNC: 0.3 MG/DL (ref 0.1–1.5)
BUN SERPL-MCNC: 10 MG/DL (ref 8–22)
BURR CELLS/RBC NFR CSF MANUAL: 0 %
CALCIUM ALBUM COR SERPL-MCNC: 8.8 MG/DL (ref 8.5–10.5)
CALCIUM SERPL-MCNC: 8.6 MG/DL (ref 8.5–10.5)
CHLORIDE SERPL-SCNC: 107 MMOL/L (ref 96–112)
CLARITY CSF: CLEAR
CO2 SERPL-SCNC: 22 MMOL/L (ref 20–33)
COLOR CSF: COLORLESS
COLOR SPUN CSF: COLORLESS
CREAT SERPL-MCNC: 0.64 MG/DL (ref 0.5–1.4)
CSF COMMENTS 1658: NORMAL
CYTOLOGY REG CYTOL: NORMAL
EOSINOPHIL # BLD AUTO: 0 K/UL (ref 0–0.51)
EOSINOPHIL NFR BLD: 0 % (ref 0–6.9)
ERYTHROCYTE [DISTWIDTH] IN BLOOD BY AUTOMATED COUNT: 94.7 FL (ref 35.9–50)
GFR SERPLBLD CREATININE-BSD FMLA CKD-EPI: 138 ML/MIN/1.73 M 2
GLOBULIN SER CALC-MCNC: 2.4 G/DL (ref 1.9–3.5)
GLUCOSE SERPL-MCNC: 92 MG/DL (ref 65–99)
HCT VFR BLD AUTO: 27.1 % (ref 42–52)
HGB BLD-MCNC: 8.4 G/DL (ref 14–18)
IMM GRANULOCYTES # BLD AUTO: 0.01 K/UL (ref 0–0.11)
IMM GRANULOCYTES NFR BLD AUTO: 0.4 % (ref 0–0.9)
LYMPHOCYTES # BLD AUTO: 0.42 K/UL (ref 1–4.8)
LYMPHOCYTES NFR BLD: 17.2 % (ref 22–41)
LYMPHOCYTES NFR CSF: 92 %
MACROCYTES BLD QL SMEAR: ABNORMAL
MCH RBC QN AUTO: 33.3 PG (ref 27–33)
MCHC RBC AUTO-ENTMCNC: 31 G/DL (ref 32.3–36.5)
MCV RBC AUTO: 107.5 FL (ref 81.4–97.8)
MONOCYTES # BLD AUTO: 0.35 K/UL (ref 0–0.85)
MONOCYTES NFR BLD AUTO: 14.3 % (ref 0–13.4)
MONOS+MACROS NFR CSF MANUAL: 8 %
NEUTROPHILS # BLD AUTO: 1.66 K/UL (ref 1.82–7.42)
NEUTROPHILS NFR BLD: 68.1 % (ref 44–72)
NRBC # BLD AUTO: 0 K/UL
NRBC BLD-RTO: 0 /100 WBC (ref 0–0.2)
NUC CELL # CSF: <1 CELLS/UL (ref 0–10)
PLATELET # BLD AUTO: 245 K/UL (ref 164–446)
PLATELET BLD QL SMEAR: ADEQUATE
PMV BLD AUTO: 9.8 FL (ref 9–12.9)
POTASSIUM SERPL-SCNC: 4.5 MMOL/L (ref 3.6–5.5)
PROT SERPL-MCNC: 6.1 G/DL (ref 6–8.2)
RBC # BLD AUTO: 2.52 M/UL (ref 4.7–6.1)
RBC # CSF: <1 CELLS/UL
RBC BLD AUTO: PRESENT
SODIUM SERPL-SCNC: 140 MMOL/L (ref 135–145)
SPECIMEN VOL CSF: 2 ML
TUBE # CSF: 2
TUBE # CSF: 2
WBC # BLD AUTO: 2.4 K/UL (ref 4.8–10.8)

## 2023-05-22 PROCEDURE — 96409 CHEMO IV PUSH SNGL DRUG: CPT

## 2023-05-22 PROCEDURE — 96450 CHEMOTHERAPY INTO CNS: CPT

## 2023-05-22 PROCEDURE — 99214 OFFICE O/P EST MOD 30 MIN: CPT | Mod: 25 | Performed by: PEDIATRICS

## 2023-05-22 PROCEDURE — 700111 HCHG RX REV CODE 636 W/ 250 OVERRIDE (IP): Mod: JZ,UD | Performed by: PEDIATRICS

## 2023-05-22 PROCEDURE — 700105 HCHG RX REV CODE 258: Mod: UD | Performed by: PEDIATRICS

## 2023-05-22 PROCEDURE — 89051 BODY FLUID CELL COUNT: CPT

## 2023-05-22 PROCEDURE — RXMED WILLOW AMBULATORY MEDICATION CHARGE: Performed by: PEDIATRICS

## 2023-05-22 PROCEDURE — 80053 COMPREHEN METABOLIC PANEL: CPT

## 2023-05-22 PROCEDURE — 96375 TX/PRO/DX INJ NEW DRUG ADDON: CPT

## 2023-05-22 PROCEDURE — 96450 CHEMOTHERAPY INTO CNS: CPT | Performed by: PEDIATRICS

## 2023-05-22 PROCEDURE — 82248 BILIRUBIN DIRECT: CPT

## 2023-05-22 PROCEDURE — 88108 CYTOPATH CONCENTRATE TECH: CPT

## 2023-05-22 PROCEDURE — 700101 HCHG RX REV CODE 250: Mod: UD | Performed by: PEDIATRICS

## 2023-05-22 PROCEDURE — 36591 DRAW BLOOD OFF VENOUS DEVICE: CPT

## 2023-05-22 PROCEDURE — 85025 COMPLETE CBC W/AUTO DIFF WBC: CPT

## 2023-05-22 PROCEDURE — A4212 NON CORING NEEDLE OR STYLET: HCPCS

## 2023-05-22 RX ORDER — ONDANSETRON 2 MG/ML
8 INJECTION INTRAMUSCULAR; INTRAVENOUS ONCE
Status: COMPLETED | OUTPATIENT
Start: 2023-05-22 | End: 2023-05-22

## 2023-05-22 RX ORDER — HEPARIN SODIUM,PORCINE 10 UNIT/ML
30 VIAL (ML) INTRAVENOUS PRN
Status: CANCELLED | OUTPATIENT
Start: 2023-05-22

## 2023-05-22 RX ORDER — MERCAPTOPURINE 50 MG/1
TABLET ORAL
Qty: 72 TABLET | Refills: 5 | Status: SHIPPED | OUTPATIENT
Start: 2023-05-22 | End: 2023-07-17 | Stop reason: SDUPTHER

## 2023-05-22 RX ORDER — 0.9 % SODIUM CHLORIDE 0.9 %
20 VIAL (ML) INJECTION PRN
Status: CANCELLED | OUTPATIENT
Start: 2023-05-22

## 2023-05-22 RX ORDER — ONDANSETRON 2 MG/ML
8 INJECTION INTRAMUSCULAR; INTRAVENOUS ONCE
Status: CANCELLED | OUTPATIENT
Start: 2023-05-23

## 2023-05-22 RX ORDER — PROMETHAZINE HYDROCHLORIDE 6.25 MG/5ML
0.25 SYRUP ORAL EVERY 6 HOURS PRN
Status: CANCELLED | OUTPATIENT
Start: 2023-05-23

## 2023-05-22 RX ORDER — PROMETHAZINE HYDROCHLORIDE 6.25 MG/5ML
0.25 SYRUP ORAL EVERY 6 HOURS PRN
Status: CANCELLED | OUTPATIENT
Start: 2023-05-22

## 2023-05-22 RX ORDER — PREDNISONE 10 MG/1
TABLET ORAL
Qty: 30 TABLET | Refills: 6 | Status: SHIPPED | OUTPATIENT
Start: 2023-05-22 | End: 2023-05-23

## 2023-05-22 RX ORDER — LIDOCAINE AND PRILOCAINE 25; 25 MG/G; MG/G
CREAM TOPICAL PRN
Status: DISCONTINUED | OUTPATIENT
Start: 2023-05-22 | End: 2023-05-23 | Stop reason: HOSPADM

## 2023-05-22 RX ORDER — ONDANSETRON 2 MG/ML
8 INJECTION INTRAMUSCULAR; INTRAVENOUS EVERY 8 HOURS PRN
Status: CANCELLED | OUTPATIENT
Start: 2023-05-23

## 2023-05-22 RX ORDER — SODIUM CHLORIDE 9 MG/ML
500 INJECTION, SOLUTION INTRAVENOUS CONTINUOUS
Status: CANCELLED | OUTPATIENT
Start: 2023-05-23

## 2023-05-22 RX ORDER — MIDAZOLAM HYDROCHLORIDE 1 MG/ML
2 INJECTION INTRAMUSCULAR; INTRAVENOUS ONCE
Status: CANCELLED
Start: 2023-05-23 | End: 2023-05-23

## 2023-05-22 RX ORDER — MIDAZOLAM HYDROCHLORIDE 1 MG/ML
2 INJECTION INTRAMUSCULAR; INTRAVENOUS ONCE
Status: COMPLETED | OUTPATIENT
Start: 2023-05-22 | End: 2023-05-22

## 2023-05-22 RX ORDER — LORAZEPAM 2 MG/ML
1 CONCENTRATE ORAL EVERY 6 HOURS PRN
Status: CANCELLED | OUTPATIENT
Start: 2023-05-23

## 2023-05-22 RX ORDER — ONDANSETRON 2 MG/ML
8 INJECTION INTRAMUSCULAR; INTRAVENOUS EVERY 8 HOURS PRN
Status: CANCELLED | OUTPATIENT
Start: 2023-05-22

## 2023-05-22 RX ORDER — HEPARIN SODIUM (PORCINE) LOCK FLUSH IV SOLN 100 UNIT/ML 100 UNIT/ML
500 SOLUTION INTRAVENOUS PRN
Status: CANCELLED | OUTPATIENT
Start: 2023-05-22

## 2023-05-22 RX ORDER — LIDOCAINE AND PRILOCAINE 25; 25 MG/G; MG/G
CREAM TOPICAL PRN
Status: CANCELLED | OUTPATIENT
Start: 2023-05-23

## 2023-05-22 RX ADMIN — VINCRISTINE SULFATE 2 MG: 1 INJECTION, SOLUTION INTRAVENOUS at 12:55

## 2023-05-22 RX ADMIN — METHOTREXATE 12 MG: 25 SOLUTION INTRA-ARTERIAL; INTRAMUSCULAR; INTRATHECAL; INTRAVENOUS at 11:58

## 2023-05-22 RX ADMIN — ONDANSETRON 8 MG: 2 INJECTION INTRAMUSCULAR; INTRAVENOUS at 11:36

## 2023-05-22 RX ADMIN — LIDOCAINE AND PRILOCAINE 2.5 %: 25; 25 CREAM TOPICAL at 09:22

## 2023-05-22 RX ADMIN — HEPARIN 500 UNITS: 100 SYRINGE at 13:05

## 2023-05-22 RX ADMIN — MIDAZOLAM HYDROCHLORIDE 2 MG: 1 INJECTION, SOLUTION INTRAMUSCULAR; INTRAVENOUS at 11:47

## 2023-05-22 ASSESSMENT — FIBROSIS 4 INDEX: FIB4 SCORE: 1.15

## 2023-05-22 NOTE — H&P
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 pain.  No diarrhea or constipation. Tomas Roy denies any issues with skin to include rash or signs of infection.  No complaints of any aches or pains in his joints but he does complain of persistent tingling in his hands.  No motor dysfunction associated with neuropathy. Tomas Roy denies any palpitations or issues with dizziness upon standing.  Able to ambulate without assistance.  Taking imatinib 600 mg by mouth QAM with 100% adherence and has not missed any doses.  Also taking weekend Bactrim.  No other medications.  No other concerns or complaints at this time.    Review of Systems:     Constitutional: Afebrile.  Clinically quite well.  No recent or remote illness.  Energy and activity are good.  Appetite and oral intake have improved and are good.  Gaining weight.  HENT: Negative for  auditory changes, nosebleeds and sore throat.  No mouth sores.  Eyes: Negative for visual changes.  Respiratory: Negative for shortness of breath.  No difficulties with breathing.  No cough.  Cardiovascular: Negative for palpitations.  Negative for increased heart rate.  Gastrointestinal: Negative for nausea, vomiting, abdominal pain, diarrhea, constipation.  Genitourinary: Negative.  Musculoskeletal: Negative.  Skin: Negative for rash, signs of infection.  Neurological: Positive for peripheral neuropathies.  No weakness.  No headache.  Endo/Heme/Allergies: Does not bruise/bleed easily.    Psychiatric/Behavioral: No changes in mood, appropriate for age.     PAST MEDICAL HISTORY:     Oncologic History:  2-3 week history of worsening fatigue, right lower extremity pain  Presentation to OS and diagnosed with right LE superficial thrombus, subsegmental PE and hyperleukocytosis, anemia and thrombocytopenia  Transferred to [facility] for definitive care  Presenting (local) WBC > 440K, Hgb 10.0, platelets 53, (automated differential ANC 3190, ALC 75,310, absolute monocyte count 52513, absolute blast count 340,560)  Uric Acid 15.6, phosphorus 5.6, LDH 1114  Rasburicase x 1 dose given   Peripheral Blood flow cytometry demonstrating CD10 pos, CD19 pos, CD20 neg, CD22 dim (60%) 5/28/2022  Peripheral blood FISH for BCR-ABL1 positive in 98% of analyzed cells     Age at Diagnosis: 21 years  White Blood Cell Count at Presentation: > 440 k/uL  Testicular Disease Status: Negative (see procedure note 5/30/2022)  CNS Status: 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, diagnostic LP with WBC 1, RBC 3 and no evidence of leukemic blasts 5/30/2022  Steroid Pre-treatment: None  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     All inclusion/exclusion criteria for [id number] met and consent signed - enrolled 5/29/2022   All inclusion/exclusion criteria for [id number] met and  consent signed - enrolled 5/30/2022  Confirmatory bone marrow aspirate and biopsy and diagnostic LP + cytarabine 70 mg IT 5/30/2022  Induction therapy (ON STUDY [id number]) started 5/30/2022  Bone marrow immunohistochemistry consistent with diagnosis of B-ALL comprising 90% of marrow cellularity  Bone marrow sample [facility] to [facility] for [facility] purposes:  Flow cytom  Of the blood pressure little high that is a problem is a cultural problem is well and cultural genetic and everything else like that unfortunately breathalyzers such bad heart disease diabetes things like that  populations etry consistent with peripheral blood, cytogenetics remarkable for known t(9;22)  CSF with WBC 1, RBC 3, no blasts identified on cytospin  FISH results available 5/31/2022 making patient eligible for transfer from [place] [address] to [place] [address] as eligibility requirements were met for [place] [address]  Patient unenrolled from [place] [address] (BCR-ABL1 fusion positive) 6/1/2022  Consent signed for [place] [address] and patient enrolled 6/1/2022 (see eligibility checklist from 5/31/2022 and consent note from 6/1/2022)  Imatinib 400 mg PO QAM / 200 mg PO QPM started 6/3/2022 (allowed per [place] [address])  Patient completed the first 15 days of a Standard 4-drug Induction on 6/13/2022  Start of [place] [address](OS), Induction IA Part 2, Day 15 6/13/2022  End of Induction 1A Part 2 - MRD negative  Start of [place] [address](OS), Induction IA Part 2, Day 15 7/5/2022  Induction IB DELAYED 2 weeks 14 days from 7/26/2022-8/9/2022) for myelosuppression - Start of Day 22 cytarabine block 8/9/2022  Induction IB Day 42 delayed 9 days for myelosuppression - Day 42 evaluations 9/7/2022  End of Induction IB - Flow cytometric MRD negative, MRD by IgH-TCR PCR [id number]%  Randomization to AR-Experimental Arm B of [place] [address]  Start of AR-Experimental Arm B, Interim Maintenance 9/29/2022  Weight based increase in dose of imatinib to 400 mg PO AM and 250 mg PM 9/29/2022  Thrombocytopenia not permissive  "of proceeding with Day 15 of Interim Maintenance  AR-Experimental Arm B, Interim Maintenance, Day 15 delayed 4 days, start 10/17/2022  AR-Experimental Arm B, Interim Maintenance, Day 29, start 11/1/2022  AR-Experimental Arm B, Interim Maintenance, Day 43, start 11/14/2022  Last does of 6-MP 11/27/2022  AR-Experimental Arm B, Delayed Intensification, Day 1, start 12/6/2022  Admission with Severe Septic Shock 12/27/2022  Imatinib HELD 12/27/2022-1/8/2023  AR-Experimental Arm B, Delayed Intensification, Day 29 DELAYED 14 days with start 1/17/2023  Hospitalization 2/7/2023 on Day 50 of Delayed Intensification with left shoulder pain ultimately diagnosed with Bacteroides fragilis infection  AR-Experimental Arm B, Interim Maintenance, Day 1 DELAYED 7 days with start 2/27/2023  AR-Experimental Arm B, Interim Maintenance, Day 11 DELAYED 4 days for platelets 43K on 3/9/2023  AR-Experimental Arm B, Interim Maintenance, Day 11 VCR given and MTX omitted for platelets 43K 3/13/2023  Imatinib HELD 3/30/2023-4/11/2023  AR-Experimental Arm B, Interim Maintenance, Day 21 (DELAYED 22 DAYS) - administered 4/11/2023 with methotrexate 80 mg/m² IV  AR-Experimental Arm B, Interim Maintenance, Day 31 (\"true\" Day 31 4/25/2023) - VCR and IT MTX given 4/28/2023 - IV MTX omitted  AR-Experimental Arm B, Interim Maintenance, Day 41 met with counts - administered 5/5/2023 with methotrexate 80 mg/m² IV    Start of Maintenance, Cycle 1, Day 1 -5/22/2023    Past Medical History:    1) Previously Healthy  2) 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  3) Hyperleukocytosis  4) Hyperuricemia  5) Hyperphosphatemia  6) Right Lower Extremity Superficial Thrombus  7) Subsegmental Pulmonary Embolism  8) 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  9) Bacteroides fragilis soft tissue infection left shoulder     Past Surgical History:     1) Temporary Right IJ Pharesis Catheter Placement 5/28/2022  2) Right-sided Port-A-Cath placement 8/29/2022  3) IR drainage " left calf hematoma  4) Left shoulder I&D     Birth/Developmental History:   1st of three children  Unremarkable pregnancy  Unremarkable delivery     Allergies:             Allergies as of 05/27/2022 - Reviewed 05/27/2022   Allergen Reaction Noted    Amoxicillin   04/03/2020      Social History:   Lives at home with mother, brother and sister.  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   Two dogs. Father not involved.     Family History:     Family History             Family History   Problem Relation Age of Onset    No Known Problems Mother      Diabetes Paternal Grandfather      Hypertension Paternal Grandfather      Hyperlipidemia Paternal Grandfather      Cancer Neg Hx      Heart Disease Neg Hx      Stroke Neg Hx           No significant family history of cancer.  Both maternal and paternal family history of diabetes.     Immunizations:  UTD    Medications:   Current Outpatient Medications on File Prior to Encounter   Medication Sig Dispense Refill    sulfamethoxazole-trimethoprim (BACTRIM DS) 800-160 MG tablet Take 2 Tablets by mouth in the morning every Sat and Sun. 48 Tablet 3    LORazepam (ATIVAN) 1 MG Tab Take 1 Tablet by mouth every four hours as needed (Nausea) for up to 30 days. 40 Tablet 0    famotidine (PEPCID) 20 MG Tab Take 1 Tablet by mouth 2 times a day. 60 Tablet 1    Melatonin 5 MG Cap Take 5 mg by mouth at bedtime as needed.      imatinib (GLEEVEC) 400 MG tablet Take 1 Tablet by mouth every day. Pt takes 200 mg + 400 mg for a total dose of 600 mg daily 30 Tablet 5    imatinib (GLEEVEC) 100 MG tablet Take 2 Tablets by mouth every day. Pt takes 200 mg + 400 mg for a total dose of 600 mg daily 60 Tablet 5    Omega-3 Fatty Acids (FISH OIL PO) Take 1 Capsule by mouth every day. (Patient not taking: Reported on 5/8/2023)      ondansetron (ZOFRAN ODT) 8 MG TABLET DISPERSIBLE Dissolve 1 Tablet by mouth every 6 hours as needed for Nausea/Vomiting. 10 Tablet 0    vitamin D3 (CHOLECALCIFEROL) 1000 Unit (25 mcg) Tab Take 1  "Tablet by mouth every day. (Patient not taking: Reported on 5/8/2023)      therapeutic multivitamin-minerals (THERAGRAN-M) Tab Take 1 Tablet by mouth every day. (Patient not taking: Reported on 5/8/2023)       No current facility-administered medications on file prior to encounter.       OBJECTIVE:     Vitals:   Pulse 88   Temp 37.4 °C (99.3 °F) (Temporal)   Resp 20   Ht 1.667 m (5' 5.63\")   Wt 62.3 kg (137 lb 5.6 oz)   SpO2 98%     Labs:  Hospital Outpatient Visit on 05/22/2023   Component Date Value    WBC 05/22/2023 2.4 (L)     RBC 05/22/2023 2.52 (L)     Hemoglobin 05/22/2023 8.4 (L)     Hematocrit 05/22/2023 27.1 (L)     MCV 05/22/2023 107.5 (H)     MCH 05/22/2023 33.3 (H)     MCHC 05/22/2023 31.0 (L)     RDW 05/22/2023 94.7 (H)     Platelet Count 05/22/2023 245     MPV 05/22/2023 9.8     Neutrophils-Polys 05/22/2023 68.10     Lymphocytes 05/22/2023 17.20 (L)     Monocytes 05/22/2023 14.30 (H)     Eosinophils 05/22/2023 0.00     Basophils 05/22/2023 0.00     Immature Granulocytes 05/22/2023 0.40     Nucleated RBC 05/22/2023 0.00     Neutrophils (Absolute) 05/22/2023 1.66 (L)     Lymphs (Absolute) 05/22/2023 0.42 (L)     Monos (Absolute) 05/22/2023 0.35     Eos (Absolute) 05/22/2023 0.00     Baso (Absolute) 05/22/2023 0.00     Immature Granulocytes (a* 05/22/2023 0.01     NRBC (Absolute) 05/22/2023 0.00     Anisocytosis 05/22/2023 2+ (A)     Macrocytosis 05/22/2023 2+ (A)     Sodium 05/22/2023 140     Potassium 05/22/2023 4.5     Chloride 05/22/2023 107     Co2 05/22/2023 22     Anion Gap 05/22/2023 11.0     Glucose 05/22/2023 92     Bun 05/22/2023 10     Creatinine 05/22/2023 0.64     Calcium 05/22/2023 8.6     AST(SGOT) 05/22/2023 32     ALT(SGPT) 05/22/2023 60 (H)     Alkaline Phosphatase 05/22/2023 85     Total Bilirubin 05/22/2023 0.3     Albumin 05/22/2023 3.7     Total Protein 05/22/2023 6.1     Globulin 05/22/2023 2.4     A-G Ratio 05/22/2023 1.5     Direct Bilirubin 05/22/2023 <0.2     Indirect " Bilirubin 05/22/2023 see below     Correct Calcium 05/22/2023 8.8     GFR (CKD-EPI) 05/22/2023 138     RBC Morphology 05/22/2023 Present     Plt Estimation 05/22/2023 Adequate     Number Of Tubes 05/22/2023 2     Volume 05/22/2023 2.0     Color-Body Fluid 05/22/2023 Colorless     Character-Body Fluid 05/22/2023 Clear     Supernatant Appearance 05/22/2023 Colorless     Total RBC Count 05/22/2023 <1     Crenated RBC 05/22/2023 0     CSF Total Nucleated Cells 05/22/2023 <1     Lymphs 05/22/2023 92     CSF Mono/Macrophages 05/22/2023 8     Comments 05/22/2023 see below     CSF Tube Number 05/22/2023 2     Cytology Reg 05/22/2023 See Path Report         Physical Exam:    Constitutional: Well-developed, well-nourished, and in no distress.  Very well-appearing.  Gaining weight.  HENT: Normocephalic and atraumatic.  Hair regrowing.  No nasal congestion or rhinorrhea. Oropharynx is clear and moist. No oral ulcerations or sores.    Eyes: Conjunctivae are normal. Pupils are equal, round.  EOMI.  Nonicteric.  Neck: Normal range of motion of neck, no adenopathy.    Cardiovascular: Normal rate, regular rhythm and normal heart sounds.  No murmur heard. DP/radial pulses 2+, cap refill < 2 sec.  Pulmonary/Chest: Effort normal and breath sounds normal. No respiratory distress. Symmetric expansion.  No crackles or wheezes.  Abdomen: Soft. Bowel sounds are normal. No distension and no mass. There is no hepatosplenomegaly.    Genitourinary:  Deferred.  Musculoskeletal: Normal range of motion of lower and upper extremities bilaterally. No tenderness to palpation of elbows, wrists, hands, knees, ankles and feet bilaterally.   Neurological: Alert and oriented to person and place. Exhibits normal muscle tone bilaterally in upper and lower extremities. Gait normal. Coordination normal.    Skin: Skin is warm, dry and pink.  No rash or evidence of skin infection.  No pallor.   Psychiatric: Mood and affect normal for age.    ASSESSMENT AND  PLAN:     Tomas Jean-Baptiste is a previously healthy 22 year old male with  Precursor B-Cell Lymphoblastic Leukemia with BCR-ABL1 fusion and whose End of Induction IB MRD was both negative by flow cytometry and PCR who presents for start of Maintenance, Cycle 1, Day 1 therapy     2) Ph+ Precursor B-Cell Acute Lymphoblastic Leukemia, in MRD Remission:  - 2-3 weeks of symptoms  - Presenting WBC > 440 k/uL, hyperleukocytosis  - Start of Hydroxyurea (1500 mg PO Q8) 2320 on 5/27/2022  - discontinued after only 55 hours  - No steroid pretreatment  - 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  - Testicular status NEGATIVE       - Flow cytometry of both 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, FISH positive for BCR-ABL1 translocation  - Enrolled on [facility] [id number]  - Initially enrolled on [facility] [id number] - but 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                            - Enrolled on [facility] [id number] and began study 6/13/2022              - Started imatinib therapy 6/3/2022   - End of Induction IA and IB MRD negative  - Imatinib dose increased to 400 mg PO AM and 250 mg PM 9/29/2022  -* Note:  All imatinib doses given inpatient rounded down to 600 mg  - Imatinib held for Septic Shock 12/27/2022-1/8/2023  - Imatinib 600 mg PO daily restarted 1/8/2023 inpatient  - Imatinib 400 mg PO QAM and 250 mg PO QHS 1/14/2023-1/17/2023  - Imatinib 600 mg PO QAM 1/17/2023 - 3/30/2023  - Imatinib 600 mg PO QAM 4/11/2023 - PRESENT                - WBC 2.4, Hgb 8.4, platelets 245             - ANC 1660,      - Creatinine 0.64   - AST 32, ALT 60, total bilirubin 0.3, direct bilirubin <0.2                - ANC 1660, platelets 245, no dose-limiting toxicities.  Okay to proceed with start of Maintenance, Cycle 1, Day 1.     [id number], AR Arm B, Maintenance, Cycle 1, Day 1:      ** Continue imatinib 600 mg PO daily (no dose adjustments made based on current BSA)  ** Methotrexate 12 mg  IT x 1 dose (see separate procedure note)  ** Vincristine 2 mg IV x1 dose  ** Begin mercaptopurine 2.5 capsules (125 mg) PO QHS x 6 days and 3 capsules (150 mg) PO QHS x 1 days  ** Begin methotrexate 14 tablets (35 mg = 20 mg/m² per week) to begin next week (held on weeks with IT MTX)  ** Begin prednisone pulse 35 mg PO BID x 5 days    - Return to clinic 2 weeks for labs only.  Return to clinic 4 weeks for Day 29 of Cycle 1 of Maintenance.  - TO RECEIVE XRT 6/5/2023-6/16/2023 (Day 29 scheduled 6/19)     2) Soft Tissue Infection / Joint Infection with Bacteroides fragilis:  - Edema and pain continue to improve  - Was initially on cefepime for F&N, added IV Vancomycin on 2/10/2023, discontinued both cefepime and vancomycin on 2/20/2023 after start of IV Flagyl (below)  - S/P open exploration/drainage 2/17/2023  - Culture: B.fragilis (both deep and superficial cultures)   - ID Consultation with Dr. Singh - recommendation for switch to metronidazole for 4+ weeks  - Flagyl DISCONTINUED 3/27/2023 after 5 weeks of therapy   - Neuropathies stable     3) Transaminitis (MILD):       -Has not did you get [facility] for all of his cycles of maintenance today so it now starting maintenance 50 today which remained day 57 this Tuesday 20     4) Chemotherapy Related Pancytopenia:  - WBC 2.4, Hgb 8.4, platelets 245             - ANC 1660,   - Transfuse for hemoglobin less than 7.5 or symptomatic  - Transfuse for platelets less than 10,000 or symptomatic  - No transfusions necessary today     5) Tachycardia (RESOLVED):   - Previous cardiac work-up to include echocardiogram as well as telemetry    6) At Risk of Opportunistic Lung Infection:  - Bactrim DS PO BID Sat and Sun for PJP prophylaxis     7) Central Access:   - R Port-A-Cath in place      Research Participant:            [facility] - Source Data         Diagnosis: Ph+ Precursor B-Cell Acute Lymphoblastic Leukemia     Disease Status: EOI1A MRD  NEGATIVE, EOI1B RD NEGATIVE, CNS3c, testicular negative, HSV1 IgG POSITIVE, CMV IgG NEGATIVE, VARICELLA IgG POSITIVE     Active Studies: [id number], [id number]                                                                                                      Inactive Studies: [id number]                                                                                                                                                Optional Studies: None             Protocol: International Phase 3 trial in [place] 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      Treatment Plan: [id number](OS), AR-Arm B, Maintenance, Cycle 1, Day 1 (5/22/2023)     Height: 1.667 m      Weight: 62.3kg       BSA: 1.70 m²   (Maintenance, Cycle 1, Day 1 5/22/2023)                                                                                                                                           Performance Status: Karnofsky 90, ECOG 1 (5/22/2023)     Treatment Plan Medications:  (100% adherent with imatinib)     Imatinib 600 mg QAM - re-evaluated BSA on 5/22/2023 - no change in dosing  Mercaptopurine 125 mg PO QHS x 6 days and 150 mg PO QHS x 1  Methotrexate 35 mg PO weekly (on weeks without LP)  Prednisone 35 mg PO BID x 5 days      Evaluations / Study Labs:       Ht/Wt/BSA and PE/Performance Status as above  WBC 2.4, Hgb 8.4, platelets 245  ANC 1660,   AST 32, ALT 60  Total bilirubin 0.3, direct bilirubin <0.2    CSF with total RBC < 1, total nucleated cells <1    Therapy Given:      Methotrexate 12 mg IT  Vincristine 2 mg IV    (Oral medications as above)        Disposition: Return to clinic 6/5/2023 for labs only.  Will begin radiation on 6/5/2023 and continue through 6/16/2023.  Return to clinic 6/19/2023 for Maintenance, Cycle 1, Day 29.    Pepe Faye MD  Pediatric Hematology / Oncology  [facility]  Cell.   [phone]  [facility]. [phone]

## 2023-05-22 NOTE — PROGRESS NOTES
"Medical Social Work    SW met with patient for monthly outreach. Patient starting first day of Maintenance. Patient appears in good spirits, and reports how glad he is to not be staying in the hospital for treatment and is on an outpatient basis now. Patient reports he is driving again, and will be transporting himself to appointments after today. MOP drove patient today and wanted to make sure he would be feeling well enough to drive himself for other treatment visits.     Patient states he is eager to start school again, and is slightly envious of the [employer] graduates this year. Patient reports he has 3 semesters remaining. SW reminded patient how much he's gone through and its completely understandable why he was not able to graduate this year. SW encouraged patient to \"cute himself some slack.\" Patient in agreement. Patient will be taking 12 credits in the fall (3 classes), which is a full time class schedule.      Patient states he has no needs at this time.     Plan: SW will continue to follow and provide support.       "

## 2023-05-22 NOTE — PROGRESS NOTES
Pt to [facility] for lab draw, doctors office visit, lumbar puncture with IT chemo and chemotherapy administration.      Afebrile.  VSS.  Awake and alert in no acute distress.      Port accessed using a 22g 3/4 inch trevino needle with 1 attempt by Liliam FERRELL RN (peds ER)  Labs drawn from the port without difficulty.   Pt tolerated well.     Office visit completed with Dr Faye.    Pt to procedure room for LP.  LP and IT chemo completed by Dr Faye.  Pt tolerated well.    Chemotherapy dosage calculated independently by Huong Foster RN  and Mary Saravia RN and compared to road map for protocol [id number].  Calculations within 10% of written order.  Lab results reviewed.      Premedications and chemo given as ordered, see MAR.  Blood return verified prior to, during, and after chemotherapy infusion.  See Chemotherapy flowsheet.  PT tolerated well.  No side effects or complications noted.  Port flushed per orders (see MAR) and de-accessed after completion. Will return for next visit on 06/19/23.

## 2023-05-22 NOTE — PROGRESS NOTES
"Pharmacy Chemotherapy Calculations Note:    Dx: B-ALL  Cycle:  1 Maintenance Day 4   Previous treatment: IM D41 on 5/5/23     Protocol: Maintenance per Arm B of [id number] [facility] ID number: [id number]            Pulse 88   Temp 37.4 °C (99.3 °F) (Temporal)   Resp 20   Ht 1.667 m (5' 5.63\")   Wt 62.3 kg (137 lb 5.6 oz)   SpO2 98%   BMI 22.42 kg/m²  Body surface area is 1.7 meters squared.    Labs from 5/22/23 reviewed - all within treatment parameters.       IT methotrexate 12 mg fixed dose for age >3 yrs   No calc req'd, ok for final dose = 12 mg IT inj    Vincristine 1.5 mg/m² (max 2 mg) x 1.7 m² = 2.55 mg   Max 2 mg, ok for final dose = 2 mg IV        Judy Bryant, PharmD, BCOP            "

## 2023-05-22 NOTE — TELEPHONE ENCOUNTER
New order for from IN-Basket for mercaptopurine (PURINETHOL) 50 MG Tab for 72 for 28 days.    Ran Test Claim-RTS 06/14/23    (No PA required at this time)    NOT a Lock-out     Pharmacy on file-  [facility]        Releasing to pharmacy     The patient can fill with [facility] or we will release to the patient's preferred pharmacy. The team will provide outreach to the patient and offer clinical services.

## 2023-05-22 NOTE — PROGRESS NOTES
Lab called with critical result of WBC of 1.3 at 0207. Critical lab result read back to lab.   This critical lab result is within parameters established by  for this patient     observed full tsp; not observed 1/2 tsp

## 2023-05-23 PROCEDURE — RXMED WILLOW AMBULATORY MEDICATION CHARGE: Performed by: PEDIATRICS

## 2023-05-23 RX ORDER — PREDNISONE 10 MG/1
TABLET ORAL
Qty: 35 TABLET | Refills: 6 | Status: SHIPPED | OUTPATIENT
Start: 2023-05-23 | End: 2023-07-17 | Stop reason: SDUPTHER

## 2023-05-23 NOTE — PROCEDURES
Pediatric Oncology Lumbar Puncture  Procedure Note      Patient Name:  Tomas Jean-Baptiste  : 2000   MRN: [id number]    Service Location:  [facility]  Date of Service: 2023  Time: 11:58 AM    Procedure Performed By: Pepe Faye M.D.    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 Presents today for scheduled chemotherapy, Maintenance, Cycle 1, Day 1 and scheduled lumbar puncture with intrathecal chemotherapy.  Prior to the procedure, the risks and benefits were discussed with the patient.  Consent for the procedure was signed by patient and placed in his chart.  All pertinent labs and history were reviewed and a complete History and Physical Examination were performed and placed in the medical record.  Tomas Roy was then taken to the procedure room where the procedure was performed.  All necessary safety equipment per ASA guidelines were available.  A time-out was performed and the patient identified by name,  and medical record number.  Gloves and mask were worn during the entire procedure.  Tomas Roy was prepped and draped in the usual sterile fashion with povoiodine.  He was positioned in the left decubitus position and  all landmarks including superior posterior iliac crest, umbilicus and vertebral bodies were identified by palpation.  A 3 in, 22 gauge spinal needle was introduced into the L3-L4 spinal interspace.  6 ml of clear fluid were obtained and sent for cell count and cytology.  Methotrexate 12 mg in 6 mL of NS was verified with the nurse bedside and then then administered into the spinal fluid. Tomas Roy tolerated the procedure without complication or bleeding.      Results:     Latest Reference Range & Units 05/22/23 11:57   Number Of Tubes  2   Volume mL 2.0   Color-Body Fluid  Colorless   Character-Body Fluid  Clear   Supernatant Appearance  Colorless   CSF Total Nucleated Cells 0 - 10 cells/uL <1   Total RBC Count cells/uL <1   Crenated RBC % 0   Lymphs % 92   CSF Mono/Macrophages % 8   CSF Tube Number  2   Comments  see below       Pepe Faye MD  Pediatric Hematology / Oncology  [facility]  Cell.  [phone]                    Lumbar Puncture    Date/Time: 5/22/2023 11:58 AM    Performed by: Pepe Faye M.D.  Authorized by: Pepe Faye M.D.

## 2023-05-26 ENCOUNTER — PHARMACY VISIT (OUTPATIENT)
Dept: PHARMACY | Facility: MEDICAL CENTER | Age: 23
End: 2023-05-26
Payer: COMMERCIAL

## 2023-05-30 ENCOUNTER — HOSPITAL ENCOUNTER (OUTPATIENT)
Dept: RADIATION ONCOLOGY | Facility: MEDICAL CENTER | Age: 23
End: 2023-05-31
Attending: RADIOLOGY
Payer: COMMERCIAL

## 2023-05-30 PROCEDURE — 77334 RADIATION TREATMENT AID(S): CPT | Mod: 26 | Performed by: RADIOLOGY

## 2023-05-30 PROCEDURE — 77334 RADIATION TREATMENT AID(S): CPT | Performed by: RADIOLOGY

## 2023-05-30 PROCEDURE — 77300 RADIATION THERAPY DOSE PLAN: CPT | Mod: 26 | Performed by: RADIOLOGY

## 2023-05-30 PROCEDURE — 77300 RADIATION THERAPY DOSE PLAN: CPT | Performed by: RADIOLOGY

## 2023-05-30 PROCEDURE — 77295 3-D RADIOTHERAPY PLAN: CPT | Mod: 26 | Performed by: RADIOLOGY

## 2023-05-30 PROCEDURE — 77295 3-D RADIOTHERAPY PLAN: CPT | Performed by: RADIOLOGY

## 2023-06-02 ENCOUNTER — HOSPITAL ENCOUNTER (OUTPATIENT)
Dept: RADIATION ONCOLOGY | Facility: MEDICAL CENTER | Age: 23
End: 2023-06-30
Attending: RADIOLOGY
Payer: COMMERCIAL

## 2023-06-05 ENCOUNTER — HOSPITAL ENCOUNTER (OUTPATIENT)
Dept: RADIATION ONCOLOGY | Facility: MEDICAL CENTER | Age: 23
End: 2023-06-05

## 2023-06-05 ENCOUNTER — HOSPITAL ENCOUNTER (OUTPATIENT)
Dept: INFUSION CENTER | Facility: MEDICAL CENTER | Age: 23
End: 2023-06-05
Attending: PEDIATRICS
Payer: COMMERCIAL

## 2023-06-05 VITALS
HEIGHT: 65 IN | HEART RATE: 105 BPM | OXYGEN SATURATION: 97 % | SYSTOLIC BLOOD PRESSURE: 124 MMHG | RESPIRATION RATE: 20 BRPM | DIASTOLIC BLOOD PRESSURE: 75 MMHG | BODY MASS INDEX: 23.36 KG/M2 | TEMPERATURE: 98.9 F | WEIGHT: 140.21 LBS

## 2023-06-05 DIAGNOSIS — C91.Z0 B LYMPHOBLASTIC LEUKEMIA WITH T(9;22)(Q34;Q11.2);BCR-ABL1 (HCC): ICD-10-CM

## 2023-06-05 DIAGNOSIS — G62.0 DRUG-INDUCED POLYNEUROPATHY (HCC): ICD-10-CM

## 2023-06-05 DIAGNOSIS — Z91.89 AT RISK FOR OPPORTUNISTIC INFECTIONS: ICD-10-CM

## 2023-06-05 LAB
ANISOCYTOSIS BLD QL SMEAR: ABNORMAL
BASOPHILS # BLD AUTO: 0.3 % (ref 0–1.8)
BASOPHILS # BLD: 0.01 K/UL (ref 0–0.12)
CHEMOTHERAPY INFUSION START DATE: NORMAL
CHEMOTHERAPY RECORDS: 1.8
CHEMOTHERAPY RECORDS: 1800
CHEMOTHERAPY RECORDS: NORMAL
CHEMOTHERAPY RX CANCER: NORMAL
COMMENT 1642: NORMAL
DATE 1ST CHEMO CANCER: NORMAL
EOSINOPHIL # BLD AUTO: 0.02 K/UL (ref 0–0.51)
EOSINOPHIL NFR BLD: 0.5 % (ref 0–6.9)
ERYTHROCYTE [DISTWIDTH] IN BLOOD BY AUTOMATED COUNT: 84.4 FL (ref 35.9–50)
HCT VFR BLD AUTO: 27.8 % (ref 42–52)
HGB BLD-MCNC: 8.4 G/DL (ref 14–18)
IMM GRANULOCYTES # BLD AUTO: 0.01 K/UL (ref 0–0.11)
IMM GRANULOCYTES NFR BLD AUTO: 0.3 % (ref 0–0.9)
LYMPHOCYTES # BLD AUTO: 0.39 K/UL (ref 1–4.8)
LYMPHOCYTES NFR BLD: 10.4 % (ref 22–41)
MACROCYTES BLD QL SMEAR: ABNORMAL
MCH RBC QN AUTO: 35 PG (ref 27–33)
MCHC RBC AUTO-ENTMCNC: 30.2 G/DL (ref 32.3–36.5)
MCV RBC AUTO: 115.8 FL (ref 81.4–97.8)
MONOCYTES # BLD AUTO: 0.29 K/UL (ref 0–0.85)
MONOCYTES NFR BLD AUTO: 7.7 % (ref 0–13.4)
MORPHOLOGY BLD-IMP: NORMAL
NEUTROPHILS # BLD AUTO: 3.04 K/UL (ref 1.82–7.42)
NEUTROPHILS NFR BLD: 80.8 % (ref 44–72)
NRBC # BLD AUTO: 0 K/UL
NRBC BLD-RTO: 0 /100 WBC (ref 0–0.2)
PLATELET # BLD AUTO: 178 K/UL (ref 164–446)
PLATELET BLD QL SMEAR: NORMAL
PMV BLD AUTO: 9.5 FL (ref 9–12.9)
RAD ONC ARIA COURSE LAST TREATMENT DATE: NORMAL
RAD ONC ARIA COURSE TREATMENT ELAPSED DAYS: NORMAL
RAD ONC ARIA REFERENCE POINT DOSAGE GIVEN TO DATE: 1.8
RAD ONC ARIA REFERENCE POINT DOSAGE GIVEN TO DATE: 1.8
RAD ONC ARIA REFERENCE POINT ID: NORMAL
RAD ONC ARIA REFERENCE POINT ID: NORMAL
RAD ONC ARIA REFERENCE POINT SESSION DOSAGE GIVEN: 1.8
RAD ONC ARIA REFERENCE POINT SESSION DOSAGE GIVEN: 1.8
RBC # BLD AUTO: 2.4 M/UL (ref 4.7–6.1)
RBC BLD AUTO: PRESENT
WBC # BLD AUTO: 3.8 K/UL (ref 4.8–10.8)

## 2023-06-05 PROCEDURE — 85025 COMPLETE CBC W/AUTO DIFF WBC: CPT

## 2023-06-05 PROCEDURE — 77280 THER RAD SIMULAJ FIELD SMPL: CPT | Performed by: RADIOLOGY

## 2023-06-05 PROCEDURE — 77280 THER RAD SIMULAJ FIELD SMPL: CPT | Mod: 26 | Performed by: RADIOLOGY

## 2023-06-05 PROCEDURE — 77412 RADIATION TX DELIVERY LVL 3: CPT | Performed by: RADIOLOGY

## 2023-06-05 PROCEDURE — 700111 HCHG RX REV CODE 636 W/ 250 OVERRIDE (IP): Mod: UD | Performed by: PEDIATRICS

## 2023-06-05 PROCEDURE — 36591 DRAW BLOOD OFF VENOUS DEVICE: CPT

## 2023-06-05 PROCEDURE — A4212 NON CORING NEEDLE OR STYLET: HCPCS

## 2023-06-05 RX ORDER — 0.9 % SODIUM CHLORIDE 0.9 %
20 VIAL (ML) INJECTION PRN
Status: CANCELLED | OUTPATIENT
Start: 2023-06-05

## 2023-06-05 RX ORDER — GABAPENTIN 300 MG/1
300 TABLET ORAL 3 TIMES DAILY
Qty: 180 TABLET | Refills: 0 | Status: SHIPPED
Start: 2023-06-05 | End: 2023-09-12

## 2023-06-05 RX ORDER — HEPARIN SODIUM (PORCINE) LOCK FLUSH IV SOLN 100 UNIT/ML 100 UNIT/ML
500 SOLUTION INTRAVENOUS PRN
Status: CANCELLED | OUTPATIENT
Start: 2023-06-05

## 2023-06-05 RX ORDER — HEPARIN SODIUM,PORCINE 10 UNIT/ML
30 VIAL (ML) INTRAVENOUS PRN
Status: CANCELLED | OUTPATIENT
Start: 2023-06-05

## 2023-06-05 RX ORDER — SULFAMETHOXAZOLE AND TRIMETHOPRIM 800; 160 MG/1; MG/1
2 TABLET ORAL
Qty: 48 TABLET | Refills: 11 | Status: SHIPPED | OUTPATIENT
Start: 2023-06-10

## 2023-06-05 RX ADMIN — HEPARIN 500 UNITS: 100 SYRINGE at 12:54

## 2023-06-05 ASSESSMENT — FIBROSIS 4 INDEX: FIB4 SCORE: .3541013345103924009

## 2023-06-05 NOTE — PROGRESS NOTES
Pt to [facility] for lab draw and DrReid visit.  Afebrile.  VSS.  Awake and alert in no acute distress.  Port accessed with 22 g 3/4in   and labs drawn from the port without difficulty / with 1 attempt.   Pt tolerated well.  Visit with Dr. Faye completed. No further orders.  Port flushed per orders (see MAR) and port de-accessed.  Plan to follow up 06/21923 for chemo.

## 2023-06-05 NOTE — CT SIMULATION
DATE OF SERVICE: 6/5/2023    Radiation Therapy Episodes       Active Episodes       No episodes documented.               Resolved Episodes       Radiation Therapy: 3D CRT (4/3/2023)                  Radiation Treatments         Plan Last Treated On Elapsed Days Fractions Treated Prescribed Fraction Dose (cGy) Prescribed Total Dose (cGy)    WBRT 6/5/2023 0 @ [id number] 1 of 10 180 1,800                  Reference Point Last Treated On Elapsed Days Most Recent Session Dose (cGy) Total Dose (cGy)    WBRT 6/5/2023 0 @ [id number] 180 180    WBRT CP 6/5/2023 0 @ [id number] 180 180                            First Visit Simple Simulation: Called by Truebeam machine to verify treatment parameters including:  treatment site, treatment dose, and treatment setup prior to first treatment. Image derived shifts reviewed in all appropriate planes.  Shifts approved.  Patient treated.    I have personally reviewed the relevant data, performed the target localization, and determined all relevant factors for this patient’s simulation.

## 2023-06-06 ENCOUNTER — HOSPITAL ENCOUNTER (OUTPATIENT)
Dept: RADIATION ONCOLOGY | Facility: MEDICAL CENTER | Age: 23
End: 2023-06-06
Payer: MEDICAID

## 2023-06-06 LAB
CHEMOTHERAPY INFUSION START DATE: NORMAL
CHEMOTHERAPY RECORDS: 1.8
CHEMOTHERAPY RECORDS: 1800
CHEMOTHERAPY RECORDS: NORMAL
CHEMOTHERAPY RX CANCER: NORMAL
DATE 1ST CHEMO CANCER: NORMAL
RAD ONC ARIA COURSE LAST TREATMENT DATE: NORMAL
RAD ONC ARIA COURSE TREATMENT ELAPSED DAYS: NORMAL
RAD ONC ARIA REFERENCE POINT DOSAGE GIVEN TO DATE: 3.6
RAD ONC ARIA REFERENCE POINT DOSAGE GIVEN TO DATE: 3.6
RAD ONC ARIA REFERENCE POINT ID: NORMAL
RAD ONC ARIA REFERENCE POINT ID: NORMAL
RAD ONC ARIA REFERENCE POINT SESSION DOSAGE GIVEN: 1.8
RAD ONC ARIA REFERENCE POINT SESSION DOSAGE GIVEN: 1.8

## 2023-06-06 PROCEDURE — 77417 THER RADIOLOGY PORT IMAGE(S): CPT | Performed by: RADIOLOGY

## 2023-06-06 PROCEDURE — 77412 RADIATION TX DELIVERY LVL 3: CPT | Performed by: RADIOLOGY

## 2023-06-07 ENCOUNTER — HOSPITAL ENCOUNTER (OUTPATIENT)
Dept: RADIATION ONCOLOGY | Facility: MEDICAL CENTER | Age: 23
End: 2023-06-07
Payer: MEDICAID

## 2023-06-07 VITALS
SYSTOLIC BLOOD PRESSURE: 122 MMHG | HEART RATE: 83 BPM | BODY MASS INDEX: 22.74 KG/M2 | OXYGEN SATURATION: 100 % | DIASTOLIC BLOOD PRESSURE: 92 MMHG | WEIGHT: 138.45 LBS

## 2023-06-07 LAB

## 2023-06-07 PROCEDURE — 77412 RADIATION TX DELIVERY LVL 3: CPT | Performed by: RADIOLOGY

## 2023-06-07 PROCEDURE — 77336 RADIATION PHYSICS CONSULT: CPT | Performed by: RADIOLOGY

## 2023-06-07 PROCEDURE — 77417 THER RADIOLOGY PORT IMAGE(S): CPT | Performed by: RADIOLOGY

## 2023-06-07 ASSESSMENT — FIBROSIS 4 INDEX: FIB4 SCORE: 0.49

## 2023-06-07 NOTE — ON TREATMENT VISIT
"  ON TREATMENT  NOTE  RADIATION ONCOLOGY DEPARTMENT    Patient name:  Tomas Jean-Baptiste    Primary Physician:  Margarito Arvizu M.D. MRN: [id number]  [facility]: [id number]   Referring physician:  Pepe Faye M.D.   : 2000, 22 y.o.     ENCOUNTER DATE:  2023      DIAGNOSIS: B-ALL, PH+, CNS3 who is undergoing treatment on [id number]      TREATMENT SUMMARY:  Course First Treatment Date 2023  Course Last Treatment Date 2023  Aria Treatment Information          2023   Aria Course Treatment Dates   Course First Treatment Date 2023    Course Last Treatment Date 2023    Aria Treatment Summary   WBRT  Plan from Course C1_WBRT   Fraction 3 of 10   Elapsed Course Days 2 @ [id number]   Prescribed Fraction Dose 180 cGy   Prescribed Total Dose 1,800 cGy   WBRT  Reference Point from Course C1_WBRT   Elapsed Course Days 2 @ [id number]   Session Dose 180 cGy   Total Dose 540 cGy   WBRT CP  Reference Point from Course C1_WBRT   Elapsed Course Days 2 @ [id number]   Session Dose 180 cGy   Total Dose 540 cGy          SUBJECTIVE:  He is doing well now, despite the multiple delays due to counts, tolerating WBRT well without issues, no fevers or chills, nausea or vomiting. Minor peripheral neuropathy is ongoing. Has recovered well with regards to ROM and function of his LUE. In good spirits, doing well with maintenance phase.    VITAL SIGNS:      2023     2:03 PM 2023    12:43 PM 2023     1:07 PM 2023     8:56 AM 2023     2:32 PM 2023    12:50 PM 2023    12:21 PM   Vitals   SYSTOLIC 122 124 114  116 114 115   DIASTOLIC 92 75 73  70 72 71   Pulse 83 105 95 88 86 98 108   Temperature  37.2 °C (98.9 °F) 37.6 °C (99.7 °F) 37.4 °C (99.3 °F) 37.5 °C (99.5 °F) 37.2 °C (99 °F) 37.4 °C (99.3 °F)   Respiration  20 16 20 20 20 20   Weight 138.45 140.21  137.35      Height  1.662 m (5' 5.43\")  1.667 m (5' 5.63\")      BMI 22.74 kg/m2 23.02 kg/m2  22.42 kg/m2      Pulse " Oximetry 100 % 97 % 99 % 98 % 100 % 99 % 100 %     KPS: 100, Fully active, able to carry on all pre-disease performed without restriction (ECOG equivalent 0)  Encounter Vitals  Blood Pressure: (!) 122/92  Pulse: 83  Pulse Oximetry: 100 %  Weight: 62.8 kg (138 lb 7.2 oz)      3/2/2023     3:04 PM   Pain Assessment   Pain Score 5=MODERATE P          PHYSICAL EXAM:  Physical Exam  HENT:      Head: Normocephalic and atraumatic.      Mouth/Throat:      Mouth: Mucous membranes are moist.      Pharynx: Oropharynx is clear.   Eyes:      Extraocular Movements: Extraocular movements intact.      Conjunctiva/sclera: Conjunctivae normal.   Abdominal:      General: Abdomen is flat.      Palpations: Abdomen is soft.   Musculoskeletal:         General: Normal range of motion.      Comments: Well healed LUE scar from recent surgeries.   Neurological:      General: No focal deficit present.      Mental Status: He is oriented to person, place, and time.      Cranial Nerves: No cranial nerve deficit.      Sensory: No sensory deficit.      Motor: No weakness.      Gait: Gait normal.              6/7/2023     2:01 PM   Toxicity Assessment   Toxicity Assessment Brain   Fatigue (lethargy, malaise, asthenia) Moderate (e.g., decrease in performance status by 1 ECOG level or 20% Karnofsky) or causing difficulty performing some activities   Radiation Dermatitis None   Nausea None   Headache Mild pain not interfering with function   External Auditory Canal Normal   Inner Ear/Hearing Normal   Middle Ear/Hearing Serous otitis without subjective decrease in hearing   Dizziness/lightheadedness Not interfering with function   Memory loss Normal   Neuropathy - Motor Normal   Seizure None   Vertigo None       CURRENT MEDICATIONS:    Current Outpatient Medications:     Gabapentin, Once-Daily, 300 MG Tab, Take 300 mg by mouth 3 times a day., Disp: 180 Tablet, Rfl: 0    [START ON 6/10/2023] sulfamethoxazole-trimethoprim (BACTRIM DS) 800-160 MG tablet,  Take 2 Tablets by mouth twice daily two days a week., Disp: 48 Tablet, Rfl: 11    predniSONE (DELTASONE) 10 MG Tab, Take 3.5 tablets by mouth in AM and PM for 5 Days at Day 1, 29 and 57 of each cycle. (Patient not taking: Reported on 6/5/2023), Disp: 35 Tablet, Rfl: 6    mercaptopurine (PURINETHOL) 50 MG Tab, Take 2.5 tablets by mouth in the evening x 6 days and 3 tablets by mouth in the evening x 1 day each week., Disp: 72 Tablet, Rfl: 5    methotrexate 2.5 MG Tab, Take 14 Tablets by mouth every 7 days., Disp: 42 Tablet, Rfl: 5    famotidine (PEPCID) 20 MG Tab, Take 1 Tablet by mouth 2 times a day., Disp: 60 Tablet, Rfl: 1    Melatonin 5 MG Cap, Take 5 mg by mouth at bedtime as needed., Disp: , Rfl:     imatinib (GLEEVEC) 400 MG tablet, Take 1 Tablet by mouth every day. Pt takes 200 mg + 400 mg for a total dose of 600 mg daily, Disp: 30 Tablet, Rfl: 5    imatinib (GLEEVEC) 100 MG tablet, Take 2 Tablets by mouth every day. Pt takes 200 mg + 400 mg for a total dose of 600 mg daily, Disp: 60 Tablet, Rfl: 5    Omega-3 Fatty Acids (FISH OIL PO), Take 1 Capsule by mouth every day. (Patient not taking: Reported on 5/8/2023), Disp: , Rfl:     ondansetron (ZOFRAN ODT) 8 MG TABLET DISPERSIBLE, Dissolve 1 Tablet by mouth every 6 hours as needed for Nausea/Vomiting., Disp: 10 Tablet, Rfl: 0    vitamin D3 (CHOLECALCIFEROL) 1000 Unit (25 mcg) Tab, Take 1 Tablet by mouth every day. (Patient not taking: Reported on 5/8/2023), Disp: , Rfl:     therapeutic multivitamin-minerals (THERAGRAN-M) Tab, Take 1 Tablet by mouth every day. (Patient not taking: Reported on 5/8/2023), Disp: , Rfl:     LABORATORY DATA:   Lab Results   Component Value Date/Time    SODIUM 140 05/22/2023 09:05 AM    POTASSIUM 4.5 05/22/2023 09:05 AM    CHLORIDE 107 05/22/2023 09:05 AM    CO2 22 05/22/2023 09:05 AM    GLUCOSE 92 05/22/2023 09:05 AM    BUN 10 05/22/2023 09:05 AM    CREATININE 0.64 05/22/2023 09:05 AM       Lab Results   Component Value Date/Time     WBC 3.8 (L) 06/05/2023 12:51 PM    RBC 2.40 (L) 06/05/2023 12:51 PM    HEMOGLOBIN 8.4 (L) 06/05/2023 12:51 PM    HEMATOCRIT 27.8 (L) 06/05/2023 12:51 PM    .8 (H) 06/05/2023 12:51 PM    MCH 35.0 (H) 06/05/2023 12:51 PM    MCHC 30.2 (L) 06/05/2023 12:51 PM    PLATELETCT 178 06/05/2023 12:51 PM         RADIOLOGY DATA:  No results found.    IMPRESSION:  Cancer Staging   B-ALL, PH+, CNS3 who is undergoing treatment on [id number]      PLAN:  No change in treatment plan    Disposition:  Treatment plan and imaging reviewed. Questions answered. Continue therapy outlined.     Irving Richards M.D.    No orders of the defined types were placed in this encounter.

## 2023-06-08 ENCOUNTER — HOSPITAL ENCOUNTER (OUTPATIENT)
Dept: RADIATION ONCOLOGY | Facility: MEDICAL CENTER | Age: 23
End: 2023-06-08
Payer: MEDICAID

## 2023-06-08 LAB

## 2023-06-08 PROCEDURE — 77412 RADIATION TX DELIVERY LVL 3: CPT | Performed by: RADIOLOGY

## 2023-06-08 PROCEDURE — 77417 THER RADIOLOGY PORT IMAGE(S): CPT | Performed by: RADIOLOGY

## 2023-06-09 ENCOUNTER — HOSPITAL ENCOUNTER (OUTPATIENT)
Dept: RADIATION ONCOLOGY | Facility: MEDICAL CENTER | Age: 23
End: 2023-06-09
Payer: MEDICAID

## 2023-06-09 LAB

## 2023-06-09 PROCEDURE — 77417 THER RADIOLOGY PORT IMAGE(S): CPT | Performed by: RADIOLOGY

## 2023-06-09 PROCEDURE — 77412 RADIATION TX DELIVERY LVL 3: CPT | Performed by: RADIOLOGY

## 2023-06-09 PROCEDURE — 77427 RADIATION TX MANAGEMENT X5: CPT | Performed by: RADIOLOGY

## 2023-06-12 ENCOUNTER — HOSPITAL ENCOUNTER (OUTPATIENT)
Dept: RADIATION ONCOLOGY | Facility: MEDICAL CENTER | Age: 23
End: 2023-06-12
Payer: MEDICAID

## 2023-06-12 LAB

## 2023-06-12 PROCEDURE — 77412 RADIATION TX DELIVERY LVL 3: CPT | Performed by: RADIOLOGY

## 2023-06-12 PROCEDURE — 77417 THER RADIOLOGY PORT IMAGE(S): CPT | Performed by: RADIOLOGY

## 2023-06-13 ENCOUNTER — HOSPITAL ENCOUNTER (OUTPATIENT)
Dept: RADIATION ONCOLOGY | Facility: MEDICAL CENTER | Age: 23
End: 2023-06-13
Payer: MEDICAID

## 2023-06-13 LAB

## 2023-06-13 PROCEDURE — 77412 RADIATION TX DELIVERY LVL 3: CPT | Performed by: RADIOLOGY

## 2023-06-13 PROCEDURE — 77417 THER RADIOLOGY PORT IMAGE(S): CPT | Performed by: RADIOLOGY

## 2023-06-14 ENCOUNTER — HOSPITAL ENCOUNTER (OUTPATIENT)
Dept: RADIATION ONCOLOGY | Facility: MEDICAL CENTER | Age: 23
End: 2023-06-14
Payer: MEDICAID

## 2023-06-14 VITALS
WEIGHT: 138.89 LBS | HEART RATE: 102 BPM | SYSTOLIC BLOOD PRESSURE: 110 MMHG | BODY MASS INDEX: 22.81 KG/M2 | DIASTOLIC BLOOD PRESSURE: 59 MMHG | OXYGEN SATURATION: 98 %

## 2023-06-14 LAB

## 2023-06-14 PROCEDURE — 77336 RADIATION PHYSICS CONSULT: CPT | Performed by: RADIOLOGY

## 2023-06-14 PROCEDURE — 77412 RADIATION TX DELIVERY LVL 3: CPT | Performed by: RADIOLOGY

## 2023-06-14 PROCEDURE — 77417 THER RADIOLOGY PORT IMAGE(S): CPT | Performed by: RADIOLOGY

## 2023-06-14 ASSESSMENT — FIBROSIS 4 INDEX: FIB4 SCORE: 0.49

## 2023-06-14 NOTE — ON TREATMENT VISIT
"  ON TREATMENT  NOTE  RADIATION ONCOLOGY DEPARTMENT    Patient name:  Tomas Jean-Baptiste    Primary Physician:  Margarito Arvizu M.D. MRN: [id number]  CSN: [id number]   Referring physician:  Pepe Faye M.D.   : 2000, 22 y.o.     ENCOUNTER DATE:  2023      DIAGNOSIS: B ALL, Ph+    TREATMENT SUMMARY:  Course First Treatment Date 2023  Course Last Treatment Date 2023  Aria Treatment Information          2023   Aria Course Treatment Dates   Course First Treatment Date 2023    Course Last Treatment Date 2023    Aria Treatment Summary   WBRT  Plan from Course C1_WBRT   Fraction 7 of 10   Elapsed Course Days 8 @ [id number]   Prescribed Fraction Dose 180 cGy   Prescribed Total Dose 1,800 cGy   WBRT  Reference Point from Course C1_WBRT   Elapsed Course Days  @ [id number]   Session Dose 180 cGy   Total Dose 1,260 cGy   WBRT CP  Reference Point from Course C1_WBRT   Elapsed Course Days  @ [id number]   Session Dose 180 cGy   Total Dose 1,260 cGy          SUBJECTIVE:  He is doing remarkably well, essentially no major adverse issues today, no headaches, nausea or vomiting, no fatigue, feeling well, in good spirits.    VITAL SIGNS:      2023     3:11 PM 2023     2:03 PM 2023    12:43 PM 2023     1:07 PM 2023     8:56 AM 2023     2:32 PM 2023    12:50 PM   Vitals   SYSTOLIC 110 122 124 114  116 114   DIASTOLIC 59 92 75 73  70 72   Pulse 102 83 105 95 88 86 98   Temperature   37.2 °C (98.9 °F) 37.6 °C (99.7 °F) 37.4 °C (99.3 °F) 37.5 °C (99.5 °F) 37.2 °C (99 °F)   Respiration   20 16 20 20 20   Weight 138.89 138.45 140.21  137.35     Height   1.662 m (5' 5.43\")  1.667 m (5' 5.63\")     BMI 22.81 kg/m2 22.74 kg/m2 23.02 kg/m2  22.42 kg/m2     Pulse Oximetry 98 % 100 % 97 % 99 % 98 % 100 % 99 %     KPS: 90, Able to carry on normal activity; minor signs or symptoms of disease (ECOG equivalent 0)  Encounter Vitals  Blood Pressure: 110/59  Pulse: " (!) 102  Pulse Oximetry: 98 %  Weight: 63 kg (138 lb 14.2 oz)      3/2/2023     3:04 PM   Pain Assessment   Pain Score 5=MODERATE P          PHYSICAL EXAM:  Physical Exam  Constitutional:       Appearance: Normal appearance.   HENT:      Head: Normocephalic and atraumatic.   Eyes:      Extraocular Movements: Extraocular movements intact.      Conjunctiva/sclera: Conjunctivae normal.   Neurological:      General: No focal deficit present.      Mental Status: He is alert and oriented to person, place, and time.      Sensory: No sensory deficit.      Motor: No weakness.      Gait: Gait normal.              6/14/2023     3:10 PM 6/7/2023     2:01 PM   Toxicity Assessment   Toxicity Assessment Brain Brain   Fatigue (lethargy, malaise, asthenia) None Moderate (e.g., decrease in performance status by 1 ECOG level or 20% Karnofsky) or causing difficulty performing some activities   Radiation Dermatitis None None   Nausea Able to eat None   Mouth Dryness Normal    Headache None Mild pain not interfering with function   External Auditory Canal Normal Normal   Inner Ear/Hearing Hearing loss on audiometry only Normal   Middle Ear/Hearing Serous otitis without subjective decrease in hearing Serous otitis without subjective decrease in hearing   Dizziness/lightheadedness None Not interfering with function   Memory loss Normal Normal   Neuropathy - Motor Normal Normal   Seizure None None   Vertigo None None       CURRENT MEDICATIONS:    Current Outpatient Medications:     Gabapentin, Once-Daily, 300 MG Tab, Take 300 mg by mouth 3 times a day., Disp: 180 Tablet, Rfl: 0    sulfamethoxazole-trimethoprim (BACTRIM DS) 800-160 MG tablet, Take 2 Tablets by mouth twice daily two days a week., Disp: 48 Tablet, Rfl: 11    predniSONE (DELTASONE) 10 MG Tab, Take 3.5 tablets by mouth in AM and PM for 5 Days at Day 1, 29 and 57 of each cycle. (Patient not taking: Reported on 6/5/2023), Disp: 35 Tablet, Rfl: 6    mercaptopurine (PURINETHOL) 50  MG Tab, Take 2.5 tablets by mouth in the evening x 6 days and 3 tablets by mouth in the evening x 1 day each week., Disp: 72 Tablet, Rfl: 5    methotrexate 2.5 MG Tab, Take 14 Tablets by mouth every 7 days., Disp: 42 Tablet, Rfl: 5    famotidine (PEPCID) 20 MG Tab, Take 1 Tablet by mouth 2 times a day., Disp: 60 Tablet, Rfl: 1    Melatonin 5 MG Cap, Take 5 mg by mouth at bedtime as needed., Disp: , Rfl:     imatinib (GLEEVEC) 400 MG tablet, Take 1 Tablet by mouth every day. Pt takes 200 mg + 400 mg for a total dose of 600 mg daily, Disp: 30 Tablet, Rfl: 5    imatinib (GLEEVEC) 100 MG tablet, Take 2 Tablets by mouth every day. Pt takes 200 mg + 400 mg for a total dose of 600 mg daily, Disp: 60 Tablet, Rfl: 5    Omega-3 Fatty Acids (FISH OIL PO), Take 1 Capsule by mouth every day. (Patient not taking: Reported on 5/8/2023), Disp: , Rfl:     ondansetron (ZOFRAN ODT) 8 MG TABLET DISPERSIBLE, Dissolve 1 Tablet by mouth every 6 hours as needed for Nausea/Vomiting., Disp: 10 Tablet, Rfl: 0    vitamin D3 (CHOLECALCIFEROL) 1000 Unit (25 mcg) Tab, Take 1 Tablet by mouth every day. (Patient not taking: Reported on 5/8/2023), Disp: , Rfl:     therapeutic multivitamin-minerals (THERAGRAN-M) Tab, Take 1 Tablet by mouth every day. (Patient not taking: Reported on 5/8/2023), Disp: , Rfl:     LABORATORY DATA:   Lab Results   Component Value Date/Time    SODIUM 140 05/22/2023 09:05 AM    POTASSIUM 4.5 05/22/2023 09:05 AM    CHLORIDE 107 05/22/2023 09:05 AM    CO2 22 05/22/2023 09:05 AM    GLUCOSE 92 05/22/2023 09:05 AM    BUN 10 05/22/2023 09:05 AM    CREATININE 0.64 05/22/2023 09:05 AM       Lab Results   Component Value Date/Time    WBC 3.8 (L) 06/05/2023 12:51 PM    RBC 2.40 (L) 06/05/2023 12:51 PM    HEMOGLOBIN 8.4 (L) 06/05/2023 12:51 PM    HEMATOCRIT 27.8 (L) 06/05/2023 12:51 PM    .8 (H) 06/05/2023 12:51 PM    MCH 35.0 (H) 06/05/2023 12:51 PM    MCHC 30.2 (L) 06/05/2023 12:51 PM    PLATELETCT 178 06/05/2023 12:51 PM          RADIOLOGY DATA:  No results found.    IMPRESSION: CNS3 B ALL Ph+, undergoing WBRT, doing well    PLAN:  No change in treatment plan    Disposition:  Treatment plan and imaging reviewed. Questions answered. Continue therapy outlined.     Irving Richards M.D.    No orders of the defined types were placed in this encounter.

## 2023-06-15 ENCOUNTER — HOSPITAL ENCOUNTER (OUTPATIENT)
Dept: RADIATION ONCOLOGY | Facility: MEDICAL CENTER | Age: 23
End: 2023-06-15
Payer: MEDICAID

## 2023-06-15 LAB

## 2023-06-15 PROCEDURE — 77412 RADIATION TX DELIVERY LVL 3: CPT | Performed by: RADIOLOGY

## 2023-06-15 PROCEDURE — 77417 THER RADIOLOGY PORT IMAGE(S): CPT | Performed by: RADIOLOGY

## 2023-06-16 ENCOUNTER — HOSPITAL ENCOUNTER (OUTPATIENT)
Dept: RADIATION ONCOLOGY | Facility: MEDICAL CENTER | Age: 23
End: 2023-06-16
Payer: MEDICAID

## 2023-06-16 LAB
CHEMOTHERAPY INFUSION START DATE: NORMAL
CHEMOTHERAPY INFUSION START DATE: NORMAL
CHEMOTHERAPY INFUSION STOP DATE: NORMAL
CHEMOTHERAPY RECORDS: 1.8
CHEMOTHERAPY RECORDS: 1.8
CHEMOTHERAPY RECORDS: 1800
CHEMOTHERAPY RECORDS: 1800
CHEMOTHERAPY RECORDS: NORMAL
CHEMOTHERAPY RX CANCER: NORMAL
CHEMOTHERAPY RX CANCER: NORMAL
DATE 1ST CHEMO CANCER: NORMAL
DATE 1ST CHEMO CANCER: NORMAL
RAD ONC ARIA COURSE LAST TREATMENT DATE: NORMAL
RAD ONC ARIA COURSE LAST TREATMENT DATE: NORMAL
RAD ONC ARIA COURSE TREATMENT ELAPSED DAYS: NORMAL
RAD ONC ARIA COURSE TREATMENT ELAPSED DAYS: NORMAL
RAD ONC ARIA REFERENCE POINT DOSAGE GIVEN TO DATE: 18
RAD ONC ARIA REFERENCE POINT ID: NORMAL
RAD ONC ARIA REFERENCE POINT SESSION DOSAGE GIVEN: 1.8
RAD ONC ARIA REFERENCE POINT SESSION DOSAGE GIVEN: 1.8

## 2023-06-16 PROCEDURE — 77412 RADIATION TX DELIVERY LVL 3: CPT | Performed by: RADIOLOGY

## 2023-06-16 PROCEDURE — 77417 THER RADIOLOGY PORT IMAGE(S): CPT | Performed by: RADIOLOGY

## 2023-06-16 PROCEDURE — 77427 RADIATION TX MANAGEMENT X5: CPT | Performed by: RADIOLOGY

## 2023-06-19 ENCOUNTER — HOSPITAL ENCOUNTER (OUTPATIENT)
Dept: INFUSION CENTER | Facility: MEDICAL CENTER | Age: 23
End: 2023-06-19
Attending: PEDIATRICS
Payer: COMMERCIAL

## 2023-06-19 ENCOUNTER — PHARMACY VISIT (OUTPATIENT)
Dept: PHARMACY | Facility: MEDICAL CENTER | Age: 23
End: 2023-06-19
Payer: COMMERCIAL

## 2023-06-19 VITALS
RESPIRATION RATE: 18 BRPM | DIASTOLIC BLOOD PRESSURE: 81 MMHG | SYSTOLIC BLOOD PRESSURE: 121 MMHG | OXYGEN SATURATION: 99 % | HEART RATE: 102 BPM | WEIGHT: 138.89 LBS | TEMPERATURE: 98.7 F | BODY MASS INDEX: 23.14 KG/M2 | HEIGHT: 65 IN

## 2023-06-19 DIAGNOSIS — C91.01 ACUTE LYMPHOBLASTIC LEUKEMIA (ALL) IN REMISSION (HCC): ICD-10-CM

## 2023-06-19 DIAGNOSIS — C91.Z0 B LYMPHOBLASTIC LEUKEMIA WITH T(9;22)(Q34;Q11.2);BCR-ABL1 (HCC): ICD-10-CM

## 2023-06-19 DIAGNOSIS — Z51.11 ENCOUNTER FOR CHEMOTHERAPY MANAGEMENT: ICD-10-CM

## 2023-06-19 LAB
ALBUMIN SERPL BCP-MCNC: 4.7 G/DL (ref 3.2–4.9)
ALBUMIN/GLOB SERPL: 2.2 G/DL
ALP SERPL-CCNC: 112 U/L (ref 30–99)
ALT SERPL-CCNC: 73 U/L (ref 2–50)
ANION GAP SERPL CALC-SCNC: 10 MMOL/L (ref 7–16)
ANISOCYTOSIS BLD QL SMEAR: ABNORMAL
AST SERPL-CCNC: 29 U/L (ref 12–45)
BASOPHILS # BLD AUTO: 0.5 % (ref 0–1.8)
BASOPHILS # BLD: 0.01 K/UL (ref 0–0.12)
BILIRUB CONJ SERPL-MCNC: <0.2 MG/DL (ref 0.1–0.5)
BILIRUB INDIRECT SERPL-MCNC: NORMAL MG/DL (ref 0–1)
BILIRUB SERPL-MCNC: 0.8 MG/DL (ref 0.1–1.5)
BUN SERPL-MCNC: 16 MG/DL (ref 8–22)
CALCIUM ALBUM COR SERPL-MCNC: 8.5 MG/DL (ref 8.5–10.5)
CALCIUM SERPL-MCNC: 9.1 MG/DL (ref 8.5–10.5)
CHLORIDE SERPL-SCNC: 105 MMOL/L (ref 96–112)
CO2 SERPL-SCNC: 24 MMOL/L (ref 20–33)
COMMENT 1642: NORMAL
CREAT SERPL-MCNC: 0.77 MG/DL (ref 0.5–1.4)
EOSINOPHIL # BLD AUTO: 0.05 K/UL (ref 0–0.51)
EOSINOPHIL NFR BLD: 2.3 % (ref 0–6.9)
ERYTHROCYTE [DISTWIDTH] IN BLOOD BY AUTOMATED COUNT: 75.4 FL (ref 35.9–50)
GFR SERPLBLD CREATININE-BSD FMLA CKD-EPI: 130 ML/MIN/1.73 M 2
GLOBULIN SER CALC-MCNC: 2.1 G/DL (ref 1.9–3.5)
GLUCOSE SERPL-MCNC: 111 MG/DL (ref 65–99)
HCT VFR BLD AUTO: 30.2 % (ref 42–52)
HGB BLD-MCNC: 9.6 G/DL (ref 14–18)
IMM GRANULOCYTES # BLD AUTO: 0.02 K/UL (ref 0–0.11)
IMM GRANULOCYTES NFR BLD AUTO: 0.9 % (ref 0–0.9)
LYMPHOCYTES # BLD AUTO: 0.26 K/UL (ref 1–4.8)
LYMPHOCYTES NFR BLD: 12.2 % (ref 22–41)
MACROCYTES BLD QL SMEAR: ABNORMAL
MCH RBC QN AUTO: 36.5 PG (ref 27–33)
MCHC RBC AUTO-ENTMCNC: 31.8 G/DL (ref 32.3–36.5)
MCV RBC AUTO: 114.8 FL (ref 81.4–97.8)
MICROCYTES BLD QL SMEAR: ABNORMAL
MONOCYTES # BLD AUTO: 0.19 K/UL (ref 0–0.85)
MONOCYTES NFR BLD AUTO: 8.9 % (ref 0–13.4)
MORPHOLOGY BLD-IMP: NORMAL
NEUTROPHILS # BLD AUTO: 1.6 K/UL (ref 1.82–7.42)
NEUTROPHILS NFR BLD: 75.2 % (ref 44–72)
NRBC # BLD AUTO: 0 K/UL
NRBC BLD-RTO: 0 /100 WBC (ref 0–0.2)
OVALOCYTES BLD QL SMEAR: NORMAL
PLATELET # BLD AUTO: 102 K/UL (ref 164–446)
PLATELET BLD QL SMEAR: NORMAL
PMV BLD AUTO: 10.4 FL (ref 9–12.9)
POIKILOCYTOSIS BLD QL SMEAR: NORMAL
POLYCHROMASIA BLD QL SMEAR: NORMAL
POTASSIUM SERPL-SCNC: 3.9 MMOL/L (ref 3.6–5.5)
PROT SERPL-MCNC: 6.8 G/DL (ref 6–8.2)
RBC # BLD AUTO: 2.63 M/UL (ref 4.7–6.1)
RBC BLD AUTO: PRESENT
SODIUM SERPL-SCNC: 139 MMOL/L (ref 135–145)
WBC # BLD AUTO: 2.1 K/UL (ref 4.8–10.8)

## 2023-06-19 PROCEDURE — A4212 NON CORING NEEDLE OR STYLET: HCPCS

## 2023-06-19 PROCEDURE — 82248 BILIRUBIN DIRECT: CPT

## 2023-06-19 PROCEDURE — 85025 COMPLETE CBC W/AUTO DIFF WBC: CPT

## 2023-06-19 PROCEDURE — 96375 TX/PRO/DX INJ NEW DRUG ADDON: CPT

## 2023-06-19 PROCEDURE — 99214 OFFICE O/P EST MOD 30 MIN: CPT | Performed by: PEDIATRICS

## 2023-06-19 PROCEDURE — 80053 COMPREHEN METABOLIC PANEL: CPT

## 2023-06-19 PROCEDURE — 96409 CHEMO IV PUSH SNGL DRUG: CPT

## 2023-06-19 PROCEDURE — 36591 DRAW BLOOD OFF VENOUS DEVICE: CPT

## 2023-06-19 PROCEDURE — RXMED WILLOW AMBULATORY MEDICATION CHARGE: Performed by: PEDIATRICS

## 2023-06-19 PROCEDURE — 700111 HCHG RX REV CODE 636 W/ 250 OVERRIDE (IP): Mod: UD | Performed by: PEDIATRICS

## 2023-06-19 PROCEDURE — 700105 HCHG RX REV CODE 258: Mod: UD | Performed by: PEDIATRICS

## 2023-06-19 RX ORDER — PROMETHAZINE HYDROCHLORIDE 6.25 MG/5ML
0.25 SYRUP ORAL EVERY 6 HOURS PRN
Status: CANCELLED | OUTPATIENT
Start: 2023-07-17

## 2023-06-19 RX ORDER — LIDOCAINE AND PRILOCAINE 25; 25 MG/G; MG/G
CREAM TOPICAL PRN
Status: CANCELLED | OUTPATIENT
Start: 2023-07-17

## 2023-06-19 RX ORDER — HEPARIN SODIUM,PORCINE 10 UNIT/ML
30 VIAL (ML) INTRAVENOUS PRN
Status: CANCELLED | OUTPATIENT
Start: 2023-06-19

## 2023-06-19 RX ORDER — ONDANSETRON 2 MG/ML
8 INJECTION INTRAMUSCULAR; INTRAVENOUS ONCE
Status: COMPLETED | OUTPATIENT
Start: 2023-06-19 | End: 2023-06-19

## 2023-06-19 RX ORDER — LIDOCAINE AND PRILOCAINE 25; 25 MG/G; MG/G
CREAM TOPICAL PRN
Status: CANCELLED | OUTPATIENT
Start: 2023-06-19

## 2023-06-19 RX ORDER — PROMETHAZINE HYDROCHLORIDE 6.25 MG/5ML
0.25 SYRUP ORAL EVERY 6 HOURS PRN
Status: CANCELLED | OUTPATIENT
Start: 2023-06-19

## 2023-06-19 RX ORDER — 0.9 % SODIUM CHLORIDE 0.9 %
20 VIAL (ML) INJECTION PRN
Status: CANCELLED | OUTPATIENT
Start: 2023-06-19

## 2023-06-19 RX ORDER — SODIUM CHLORIDE 9 MG/ML
500 INJECTION, SOLUTION INTRAVENOUS CONTINUOUS
Status: CANCELLED | OUTPATIENT
Start: 2023-06-19

## 2023-06-19 RX ORDER — ONDANSETRON 2 MG/ML
8 INJECTION INTRAMUSCULAR; INTRAVENOUS ONCE
Status: CANCELLED | OUTPATIENT
Start: 2023-06-19

## 2023-06-19 RX ORDER — ONDANSETRON 2 MG/ML
8 INJECTION INTRAMUSCULAR; INTRAVENOUS EVERY 8 HOURS PRN
Status: CANCELLED | OUTPATIENT
Start: 2023-07-17

## 2023-06-19 RX ORDER — LORAZEPAM 2 MG/ML
1 CONCENTRATE ORAL EVERY 6 HOURS PRN
Status: CANCELLED | OUTPATIENT
Start: 2023-07-17

## 2023-06-19 RX ORDER — HEPARIN SODIUM (PORCINE) LOCK FLUSH IV SOLN 100 UNIT/ML 100 UNIT/ML
500 SOLUTION INTRAVENOUS PRN
Status: CANCELLED | OUTPATIENT
Start: 2023-06-19

## 2023-06-19 RX ORDER — ONDANSETRON 2 MG/ML
8 INJECTION INTRAMUSCULAR; INTRAVENOUS EVERY 8 HOURS PRN
Status: CANCELLED | OUTPATIENT
Start: 2023-06-19

## 2023-06-19 RX ORDER — ONDANSETRON 2 MG/ML
8 INJECTION INTRAMUSCULAR; INTRAVENOUS ONCE
Status: CANCELLED | OUTPATIENT
Start: 2023-07-17

## 2023-06-19 RX ORDER — SODIUM CHLORIDE 9 MG/ML
500 INJECTION, SOLUTION INTRAVENOUS CONTINUOUS
Status: CANCELLED | OUTPATIENT
Start: 2023-07-17

## 2023-06-19 RX ORDER — LORAZEPAM 2 MG/ML
1 CONCENTRATE ORAL EVERY 6 HOURS PRN
Status: CANCELLED | OUTPATIENT
Start: 2023-06-19

## 2023-06-19 RX ADMIN — ONDANSETRON 8 MG: 2 INJECTION INTRAMUSCULAR; INTRAVENOUS at 12:05

## 2023-06-19 RX ADMIN — VINCRISTINE SULFATE 2 MG: 1 INJECTION, SOLUTION INTRAVENOUS at 12:15

## 2023-06-19 RX ADMIN — HEPARIN 500 UNITS: 100 SYRINGE at 12:23

## 2023-06-19 ASSESSMENT — FIBROSIS 4 INDEX: FIB4 SCORE: 0.49

## 2023-06-19 NOTE — PROGRESS NOTES
Pt to [facility] for lab draw, doctors office visit, and chemotherapy administration.      Afebrile.  VSS.  Awake and alert in no acute distress.      Port accessed using a 22g 3/4 inch trevino needle with 1 attempt.  Labs drawn from the port without difficulty.   Pt tolerated well.      Chemotherapy dosage calculated independently by Tammie Covington RN and Huong Foster RN and compared to road map for protocol [id number] COG.  Calculations within 10% of written order.  Lab results reviewed.      Premedications and chemo given as ordered, see MAR.  Blood return verified prior to, during, and after chemotherapy infusion.  See Chemotherapy flowsheet.  PT tolerated well.  No side effects or complications noted.  Port flushed per orders (see MAR) and de-accessed after completion. PT home with self.  Will return for next visit on 7/17/23.

## 2023-06-19 NOTE — PROGRESS NOTES
"Pharmacy Chemotherapy Calculations Note:    Dx: B-ALL (CNS3)  Cycle:  1 Maintenance Day 29   Previous treatment: Maint C1D1 on 5/22/23     Protocol: Maintenance per Arm B of [id number] [facility] ID number: [id number]       **JULY did receive cranial radiation, no LP on D29       /81   Pulse (!) 102   Temp 37.1 °C (98.7 °F) (Temporal)   Resp 18   Ht 1.655 m (5' 5.16\")   Wt 63 kg (138 lb 14.2 oz)   SpO2 99%   BMI 23.00 kg/m²  Body surface area is 1.7 meters squared.    MD to review any ordered labs         Vincristine 1.5 mg/m² (max 2 mg) x 1.7 m² = 2.55 mg   Max 2 mg, ok for final dose = 2 mg IV        Judy Bryant, PharmD, BCOP            "

## 2023-06-20 ENCOUNTER — TELEPHONE (OUTPATIENT)
Dept: INFUSION CENTER | Facility: MEDICAL CENTER | Age: 23
End: 2023-06-20
Payer: MEDICAID

## 2023-06-20 NOTE — TELEPHONE ENCOUNTER
Call placed to patient. Ensured patient doing well from previous visit. Discussed any concerns or questions with patient, none at this time.

## 2023-06-22 NOTE — RADIATION COMPLETION NOTES
END OF TREATMENT SUMMARY    Patient name:  Tomas Jean-Baptiste    Primary Physician:  Margarito Arvizu M.D. MRN: [id number]  CSN: [id number]   Referring physician:  No ref. provider found  : 2000, 22 y.o.       TREATMENT SUMMARY:        Course First Treatment Date 2023    Course Last Treatment Date 2023   Course Elapsed Days 11 @ [id number]   Course Intent Curative     Radiation Therapy Episodes       Active Episodes       No episodes documented.               Resolved Episodes       Radiation Therapy: 3D CRT (4/3/2023)                  Radiation Treatments         Plan Last Treated On Elapsed Days Fractions Treated Prescribed Fraction Dose (cGy) Prescribed Total Dose (cGy)    WBRT 2023 11 @ [id number] 10 of 10 180 1,800                  Reference Point Last Treated On Elapsed Days Most Recent Session Dose (cGy) Total Dose (cGy)    WBRT 2023 11 @ [id number] -- 1,800    WBRT CP 2023 11 @ [id number] -- 1,800                                     STAGE: B lymphoblastic leukemia     TREATMENT INDICATION:   Initial CNS 3 disease     CONCURRENT SYSTEMIC TREATMENT:   As per [id number]     RT COURSE DISCONTINUED EARLY:   No     PATIENT EXPERIENCE:       2023     3:10 PM 2023     2:01 PM   Toxicity Assessment   Toxicity Assessment Brain Brain   Fatigue (lethargy, malaise, asthenia) None Moderate (e.g., decrease in performance status by 1 ECOG level or 20% Karnofsky) or causing difficulty performing some activities   Radiation Dermatitis None None   Nausea Able to eat None   Mouth Dryness Normal    Headache None Mild pain not interfering with function   External Auditory Canal Normal Normal   Inner Ear/Hearing Hearing loss on audiometry only Normal   Middle Ear/Hearing Serous otitis without subjective decrease in hearing Serous otitis without subjective decrease in hearing   Dizziness/lightheadedness None Not interfering with function   Memory loss Normal Normal    Neuropathy - Motor Normal Normal   Seizure None None   Vertigo None None        FOLLOW-UP PLAN:   8 Weeks     COMMENT:          ANATOMIC TARGET SUMMARY    ANATOMIC TARGET MODALITY TECHNIQUE   Whole brain   External beam, photons 3D Conformal            COMMENT:         DIAGRAMS:      DOSE VOLUME HISTOGRAMS:

## 2023-06-23 ENCOUNTER — TELEPHONE (OUTPATIENT)
Dept: PHARMACY | Facility: MEDICAL CENTER | Age: 23
End: 2023-06-23
Payer: MEDICAID

## 2023-06-23 NOTE — TELEPHONE ENCOUNTER
Drug: Gleevec 100mg tablets   Received Order.  Ran test claim and PA is needed    PA has been submitted via CMM: (Key: [id number])    Drug: Gleevec 400mg tablets   Received Order.  Ran test claim and PA is needed    PA has been submitted via CMM: (Key: [id number]) - [id number]

## 2023-06-23 NOTE — TELEPHONE ENCOUNTER
Drug: Gleevac 400mg & 100mg tablets   Status: APPROVED   Coverage dates: thru 06/23/2024  Copay:  N/A - lockout   Fill with [facility]: no -must fill thru [facility] (scanned in media)     Will release to preferred pharmacy

## 2023-06-23 NOTE — PROGRESS NOTES
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 soaking his feet in Epsom salts and this seems to have improved his symptoms.  No complaints of any nausea, vomiting, diarrhea or constipation.  No abdominal discomfort or pain.  No skin changes or rashes have been reported. Tomas Roy reports 100% adherence with his imatinib 600 mg PO  AM, 6-mercaptopurine 2.5 capsules (125 mg) PO QHS x 6 days and 3 capsules (150 mg) PO QHS x 1 days, methotrexate 14 tablets every week as well as having been 100% adherent with his prednisone pulse at the beginning of Cycle 1.  He also has been 100% adherent with his weekend Bactrim.  No other concerns or complaints at this time.    Review of Systems:     Constitutional:  Afebrile. No remote or acute illness.  Energy and activity are greatly improved.  Oral intake and appetite have also improved.  HENT: Negative for auditory changes, nosebleeds and sore throat.   No mouth sores.  Eyes: Negative for visual changes.  Respiratory: Negative for shortness of breath.  No cough.  Cardiovascular: Negative.  Gastrointestinal: Negative for nausea, vomiting, abdominal pain, diarrhea, constipation.  Genitourinary: Negative.  Musculoskeletal: Negative for joint or muscle pain.  Improved paresthesias in feet.  Skin: Negative for rash, signs of infection.  Neurological: Negative for numbness, tingling, sensory changes, weakness or headaches.    Endo/Heme/Allergies: Does not bruise/bleed easily.    Psychiatric/Behavioral: No changes in mood, appropriate for age.     PAST MEDICAL HISTORY:     Oncologic History:  2-3 week history of worsening fatigue, right lower extremity pain  Presentation to OS and diagnosed with right LE superficial thrombus, subsegmental PE and hyperleukocytosis, anemia and thrombocytopenia  Transferred to [facility] care  Presenting (local) WBC > 440K, Hgb 10.0, platelets 53, (automated differential ANC 3190, ALC 75,310, absolute monocyte count 73746, absolute blast count 340,560)  Uric Acid 15.6, phosphorus 5.6, LDH 1114  Rasburicase x 1 dose given   Peripheral Blood flow cytometry demonstrating CD10 pos, CD19 pos, CD20 neg, CD22 dim (60%) 5/28/2022  Peripheral blood FISH for BCR-ABL1 positive in 98% of analyzed cells     Age at Diagnosis: 21 years  White Blood Cell Count at Presentation: > 440 k/uL  Testicular Disease Status: Negative (see procedure note 5/30/2022)  CNS Status: 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, diagnostic LP with WBC 1, RBC 3 and no evidence of leukemic blasts 5/30/2022  Steroid Pre-treatment: None  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     All inclusion/exclusion criteria for [id number] met and consent signed - enrolled 5/29/2022   All inclusion/exclusion criteria for [id number] met and consent signed - enrolled 5/30/2022  Confirmatory bone marrow aspirate  and biopsy and diagnostic LP + cytarabine 70 mg IT 5/30/2022  Induction therapy (ON STUDY [id number]) started 5/30/2022  Bone marrow immunohistochemistry consistent with diagnosis of B-ALL comprising 90% of marrow cellularity  Bone marrow sample sent to [facility] for [facility] purposes:  Flow cytom  Of the blood pressure little high that is a problem is a cultural problem is well and cultural genetic and everything else like that unfortunately breathalyzers such bad heart disease diabetes things like that  populations etry consistent with peripheral blood, cytogenetics remarkable for known t(9;22)  CSF with WBC 1, RBC 3, no blasts identified on cytospin  FISH results available 5/31/2022 making patient eligible for transfer from [place] [address] to [place] [address] as eligibility requirements were met for [place] [address]  Patient unenrolled from [place] [address] (BCR-ABL1 fusion positive) 6/1/2022  Consent signed for [place] [address] and patient enrolled 6/1/2022 (see eligibility checklist from 5/31/2022 and consent note from 6/1/2022)  Imatinib 400 mg PO QAM / 200 mg PO QPM started 6/3/2022 (allowed per [place] [address])  Patient completed the first 15 days of a Standard 4-drug Induction on 6/13/2022  Start of [place] [address](OS), Induction IA Part 2, Day 15 6/13/2022  End of Induction 1A Part 2 - MRD negative  Start of [place] [address](OS), Induction IA Part 2, Day 15 7/5/2022  Induction IB DELAYED 2 weeks 14 days from 7/26/2022-8/9/2022) for myelosuppression - Start of Day 22 cytarabine block 8/9/2022  Induction IB Day 42 delayed 9 days for myelosuppression - Day 42 evaluations 9/7/2022  End of Induction IB - Flow cytometric MRD negative, MRD by IgH-TCR PCR [id number]%  Randomization to AR-Experimental Arm B of [place] [address]  Start of AR-Experimental Arm B, Interim Maintenance 9/29/2022  Weight based increase in dose of imatinib to 400 mg PO AM and 250 mg PM 9/29/2022  Thrombocytopenia not permissive of proceeding with Day 15 of Interim Maintenance  AR-Experimental Arm  "B, Interim Maintenance, Day 15 delayed 4 days, start 10/17/2022  AR-Experimental Arm B, Interim Maintenance, Day 29, start 11/1/2022  AR-Experimental Arm B, Interim Maintenance, Day 43, start 11/14/2022  Last does of 6-MP 11/27/2022  AR-Experimental Arm B, Delayed Intensification, Day 1, start 12/6/2022  Admission with Severe Septic Shock 12/27/2022  Imatinib HELD 12/27/2022-1/8/2023  AR-Experimental Arm B, Delayed Intensification, Day 29 DELAYED 14 days with start 1/17/2023  Hospitalization 2/7/2023 on Day 50 of Delayed Intensification with left shoulder pain ultimately diagnosed with Bacteroides fragilis infection  AR-Experimental Arm B, Interim Maintenance, Day 1 DELAYED 7 days with start 2/27/2023  AR-Experimental Arm B, Interim Maintenance, Day 11 DELAYED 4 days for platelets 43K on 3/9/2023  AR-Experimental Arm B, Interim Maintenance, Day 11 VCR given and MTX omitted for platelets 43K 3/13/2023  Imatinib HELD 3/30/2023-4/11/2023  AR-Experimental Arm B, Interim Maintenance, Day 21 (DELAYED 22 DAYS) - administered 4/11/2023 with methotrexate 80 mg/m² IV  AR-Experimental Arm B, Interim Maintenance, Day 31 (\"true\" Day 31 4/25/2023) - VCR and IT MTX given 4/28/2023 - IV MTX omitted  AR-Experimental Arm B, Interim Maintenance, Day 41 met with counts - administered 5/5/2023 with methotrexate 80 mg/m² IV     Start of Maintenance, Cycle 1, Day 1 -5/22/2023  Cranial radiation 6/5/2023-6/16/2023 (10 fractions)  Maintenance, Cycle 1, Day 29 -6/23/2023     Past Medical History:    1) Previously Healthy  2) 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  3) Hyperleukocytosis  4) Hyperuricemia  5) Hyperphosphatemia  6) Right Lower Extremity Superficial Thrombus  7) Subsegmental Pulmonary Embolism  8) 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  9) Bacteroides fragilis soft tissue infection left shoulder     Past Surgical History:     1) Temporary Right IJ Pharesis Catheter Placement 5/28/2022  2) Right-sided Port-A-Cath placement " 8/29/2022  3) IR drainage left calf hematoma  4) Left shoulder I&D  5) Cranial XRT 6/5/2023-6/16/2023     Birth/Developmental History:   1st of three children  Unremarkable pregnancy  Unremarkable delivery     Allergies:             Allergies as of 05/27/2022 - Reviewed 05/27/2022   Allergen Reaction Noted    Amoxicillin   04/03/2020      Social History:   Lives at home with mother, brother and sister.  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   Two dogs. Father not involved.     Family History:     Family History             Family History   Problem Relation Age of Onset    No Known Problems Mother      Diabetes Paternal Grandfather      Hypertension Paternal Grandfather      Hyperlipidemia Paternal Grandfather      Cancer Neg Hx      Heart Disease Neg Hx      Stroke Neg Hx           No significant family history of cancer.  Both maternal and paternal family history of diabetes.     Immunizations:  UTD     Medications:   Current Outpatient Medications on File Prior to Encounter   Medication Sig Dispense Refill    Gabapentin, Once-Daily, 300 MG Tab Take 300 mg by mouth 3 times a day. 180 Tablet 0    sulfamethoxazole-trimethoprim (BACTRIM DS) 800-160 MG tablet Take 2 Tablets by mouth twice daily two days a week. 48 Tablet 11    predniSONE (DELTASONE) 10 MG Tab Take 3.5 tablets by mouth in AM and PM for 5 Days at Day 1, 29 and 57 of each cycle. (Patient not taking: Reported on 6/5/2023) 35 Tablet 6    mercaptopurine (PURINETHOL) 50 MG Tab Take 2.5 tablets by mouth in the evening x 6 days and 3 tablets by mouth in the evening x 1 day each week. 72 Tablet 5    methotrexate 2.5 MG Tab Take 14 Tablets by mouth every 7 days. 42 Tablet 5    famotidine (PEPCID) 20 MG Tab Take 1 Tablet by mouth 2 times a day. 60 Tablet 1    Melatonin 5 MG Cap Take 5 mg by mouth at bedtime as needed.      imatinib (GLEEVEC) 400 MG tablet Take 1 Tablet by mouth every day. Pt takes 200 mg + 400 mg for a total dose of 600 mg daily 30 Tablet 5    imatinib  "(GLEEVEC) 100 MG tablet Take 2 Tablets by mouth every day. Pt takes 200 mg + 400 mg for a total dose of 600 mg daily 60 Tablet 5    Omega-3 Fatty Acids (FISH OIL PO) Take 1 Capsule by mouth every day. (Patient not taking: Reported on 5/8/2023)      ondansetron (ZOFRAN ODT) 8 MG TABLET DISPERSIBLE Dissolve 1 Tablet by mouth every 6 hours as needed for Nausea/Vomiting. 10 Tablet 0    vitamin D3 (CHOLECALCIFEROL) 1000 Unit (25 mcg) Tab Take 1 Tablet by mouth every day. (Patient not taking: Reported on 5/8/2023)      therapeutic multivitamin-minerals (THERAGRAN-M) Tab Take 1 Tablet by mouth every day. (Patient not taking: Reported on 5/8/2023)       No current facility-administered medications on file prior to encounter.         OBJECTIVE:     Vitals:   /81   Pulse (!) 102   Temp 37.1 °C (98.7 °F) (Temporal)   Resp 18   Ht 1.655 m (5' 5.16\")   Wt 63 kg (138 lb 14.2 oz)   SpO2 99%     Labs:  Hospital Outpatient Visit on 06/19/2023   Component Date Value    WBC 06/19/2023 2.1 (L)     RBC 06/19/2023 2.63 (L)     Hemoglobin 06/19/2023 9.6 (L)     Hematocrit 06/19/2023 30.2 (L)     MCV 06/19/2023 114.8 (H)     MCH 06/19/2023 36.5 (H)     MCHC 06/19/2023 31.8 (L)     RDW 06/19/2023 75.4 (H)     Platelet Count 06/19/2023 102 (L)     MPV 06/19/2023 10.4     Neutrophils-Polys 06/19/2023 75.20 (H)     Lymphocytes 06/19/2023 12.20 (L)     Monocytes 06/19/2023 8.90     Eosinophils 06/19/2023 2.30     Basophils 06/19/2023 0.50     Immature Granulocytes 06/19/2023 0.90     Nucleated RBC 06/19/2023 0.00     Neutrophils (Absolute) 06/19/2023 1.60 (L)     Lymphs (Absolute) 06/19/2023 0.26 (L)     Monos (Absolute) 06/19/2023 0.19     Eos (Absolute) 06/19/2023 0.05     Baso (Absolute) 06/19/2023 0.01     Immature Granulocytes (a* 06/19/2023 0.02     NRBC (Absolute) 06/19/2023 0.00     Anisocytosis 06/19/2023 2+ (A)     Macrocytosis 06/19/2023 2+ (A)     Microcytosis 06/19/2023 1+     Sodium 06/19/2023 139     Potassium " 06/19/2023 3.9     Chloride 06/19/2023 105     Co2 06/19/2023 24     Anion Gap 06/19/2023 10.0     Glucose 06/19/2023 111 (H)     Bun 06/19/2023 16     Creatinine 06/19/2023 0.77     Calcium 06/19/2023 9.1     AST(SGOT) 06/19/2023 29     ALT(SGPT) 06/19/2023 73 (H)     Alkaline Phosphatase 06/19/2023 112 (H)     Total Bilirubin 06/19/2023 0.8     Albumin 06/19/2023 4.7     Total Protein 06/19/2023 6.8     Globulin 06/19/2023 2.1     A-G Ratio 06/19/2023 2.2     Direct Bilirubin 06/19/2023 <0.2     Indirect Bilirubin 06/19/2023 see below     Correct Calcium 06/19/2023 8.5     GFR (CKD-EPI) 06/19/2023 130     Plt Estimation 06/19/2023 Decreased     RBC Morphology 06/19/2023 Present     Polychromia 06/19/2023 1+     Poikilocytosis 06/19/2023 1+     Ovalocytes 06/19/2023 1+     Peripheral Smear Review 06/19/2023 see below     Comments-Diff 06/19/2023 see below         Physical Exam:    Constitutional: Well-developed, well-nourished, and in no distress.  Very well-appearing.  HENT: Normocephalic and atraumatic.  Hair regrowing.  No nasal congestion or rhinorrhea. Oropharynx is clear and moist. No oral ulcerations or sores.    Eyes: Conjunctivae are normal. Pupils are equal, round.  EOMI.  Nonicteric.  Neck: Normal range of motion of neck, no adenopathy.    Cardiovascular: Normal rate, regular rhythm and normal heart sounds.  No murmur heard. DP/radial pulses 2+, cap refill < 2 sec.  Pulmonary/Chest: Effort normal and breath sounds normal. No respiratory distress. Symmetric expansion.  No crackles or wheezes.  Abdomen: Soft. Bowel sounds are normal. No distension and no mass. There is no hepatosplenomegaly.    Genitourinary:  Deferred.  Musculoskeletal: Normal range of motion of lower and upper extremities bilaterally.   Neurological: Alert and oriented to person and place. Exhibits normal muscle tone bilaterally in upper and lower extremities. Gait normal. Coordination normal.    Skin: Skin is warm, dry and pink.  No  rash or evidence of skin infection.  No pallor.   Psychiatric: Mood and affect normal for age.    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 and whose End of Induction IB MRD was both negative by flow cytometry and PCR who presents for start of Maintenance, Cycle 1, Day 29 therapy     2) Ph+ Precursor B-Cell Acute Lymphoblastic Leukemia, in MRD Remission:  - 2-3 weeks of symptoms  - Presenting WBC > 440 k/uL, hyperleukocytosis  - Start of Hydroxyurea (1500 mg PO Q8) 2320 on 5/27/2022  - discontinued after only 55 hours  - No steroid pretreatment  - 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  - Testicular status NEGATIVE       - Flow cytometry of both 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, FISH positive for BCR-ABL1 translocation  - Enrolled on [facility] [id number]  - Initially enrolled on [facility] [id number] - but 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                            - Enrolled on [facility] [id number] and began study 6/13/2022              - Started imatinib therapy 6/3/2022   - End of Induction IA and IB MRD negative  - Imatinib dose increased to 400 mg PO AM and 250 mg PM 9/29/2022  -* Note:  All imatinib doses given inpatient rounded down to 600 mg  - Imatinib held for Septic Shock 12/27/2022-1/8/2023  - Imatinib 600 mg PO daily restarted 1/8/2023 inpatient  - Imatinib 400 mg PO QAM and 250 mg PO QHS 1/14/2023-1/17/2023  - Imatinib 600 mg PO QAM 1/17/2023 - 3/30/2023  - Imatinib 600 mg PO QAM 4/11/2023 - PRESENT (VERIFIED WEIGHT / NO DOSE ADJUSTMENTS TODAY)                - WBC 2.1, Hgb 9.6 , platelets 102   - ANC 1600,                - Creatinine 0.77             - AST 29, ALT 73, direct bilirubin <0.2                - ANC 1600, platelets 102, no dose-limiting toxicities.  Okay to proceed with start of Maintenance, Cycle 1, Day 29   - Given this  is Cycle 1, will NOT dose adjust medications for ANC > 1550 x 2 - will re-evaluate in Cycle 2     BRYANNA, AR Arm B, Maintenance, Cycle 1, Day 29:      ** Continue imatinib 600 mg PO daily (no dose adjustments made based on current BSA)  ** NO IT MTX GIVEN AT THIS VISIT GIVEN CRANIAL RADIATION IN CYCLE 1  ** Vincristine 2 mg IV x1 dose  ** Continue mercaptopurine 2.5 capsules (125 mg) PO QHS x 6 days and 3 capsules (150 mg) PO QHS x 1 days  ** Continue methotrexate 14 tablets (35 mg = 20 mg/m² per week) to begin next week (held on weeks with IT MTX)  ** Begin prednisone pulse 35 mg PO BID x 5 days      - Return to clinic 4 weeks for Day 56 of Cycle 1 of Maintenance.     2) Soft Tissue Infection / Joint Infection with Bacteroides fragilis:  - Edema and pain continue to improve  - Was initially on cefepime for F&N, added IV Vancomycin on 2/10/2023, discontinued both cefepime and vancomycin on 2/20/2023 after start of IV Flagyl (below)  - S/P open exploration/drainage 2/17/2023  - Culture: B.fragilis (both deep and superficial cultures)   - ID Consultation with Dr. Singh - recommendation for switch to metronidazole for 4+ weeks  - Flagyl DISCONTINUED 3/27/2023 after 5 weeks of therapy   - Neuropathies improved     3) Transaminitis (MILD):              - AST 29, ALT 73, total bilirubin 0.8, direct bilirubin <0.2  - Continue to monitor now taking 6-mercaptopurine and oral methotrexate    4) Chemotherapy Related Pancytopenia:  - WBC 2.1, Hgb 9.6 , platelets 102   - ANC 1600,   - Transfuse for hemoglobin less than 7.5 or symptomatic  - Transfuse for platelets less than 10,000 or symptomatic  - No transfusions necessary today     5) Tachycardia (RESOLVED):   - Previous cardiac work-up to include echocardiogram as well as telemetry     6) At Risk of Opportunistic Lung Infection:  - Bactrim DS PO BID Sat and Sun for PJP prophylaxis     7) Central Access:   - R Port-A-Cath in place      Research Participant:             [facility] - Source Data         Diagnosis: Ph+ Precursor B-Cell Acute Lymphoblastic Leukemia     Disease Status: EOI1A MRD NEGATIVE, EOI1B RD NEGATIVE, CNS3c, testicular negative, HSV1 IgG POSITIVE, CMV IgG NEGATIVE, VARICELLA IgG POSITIVE     Active Studies: [id number], [id number]                                                                                                      Inactive Studies: [id number]                                                                                                                                                Optional Studies: None             Protocol: International Phase 3 trial in [place] 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      Treatment Plan: [id number](OS), AR-Arm B, Maintenance, Cycle 1, Day 29 (6/19/2023)     Height: 1.667 m      Weight: 62.3kg       BSA: 1.70 m²   (Maintenance, Cycle 1, Day 1 5/22/2023)                                                                                                                                           Performance Status: Karnofsky 90, ECOG 1 (5/22/2023)     Treatment Plan Medications:  (100% adherent with imatinib)     Imatinib 600 mg QAM - re-evaluated BSA on 5/22/2023 - no change in dosing  Mercaptopurine 125 mg PO QHS x 6 days and 150 mg PO QHS x 1  Methotrexate 35 mg PO weekly (on weeks without LP)  Prednisone 35 mg PO BID x 5 days      Evaluations / Study Labs:       Ht/Wt/BSA and PE/Performance Status as above  WBC 2.1, Hgb 9.6, platelets 102  ANC 1600,   AST 29, ALT 73  Total bilirubin 0.8, direct bilirubin <0.2       Therapy Given:      Vincristine 2 mg IV     (Oral medications as above)         Disposition: Return to clinic 6/5/2023 for labs only.  Will begin radiation on 6/5/2023 and continue through 6/16/2023.  Return to clinic 6/19/2023 for Maintenance, Cycle 1, Day 29.      Pepe Faye,  MD  Pediatric Hematology / Oncology  [facility]  Cell.  [phone]  [facility]. [phone]

## 2023-07-05 DIAGNOSIS — C91.Z0 B LYMPHOBLASTIC LEUKEMIA WITH T(9;22)(Q34;Q11.2);BCR-ABL1 (HCC): ICD-10-CM

## 2023-07-05 PROCEDURE — RXMED WILLOW AMBULATORY MEDICATION CHARGE: Performed by: PEDIATRICS

## 2023-07-05 RX ORDER — IMATINIB MESYLATE 100 MG/1
200 TABLET, FILM COATED ORAL DAILY
Qty: 60 TABLET | Refills: 5 | Status: SHIPPED | OUTPATIENT
Start: 2023-07-05 | End: 2023-11-06

## 2023-07-05 RX ORDER — IMATINIB MESYLATE 400 MG/1
400 TABLET, FILM COATED ORAL DAILY
Qty: 30 TABLET | Refills: 5 | Status: SHIPPED | OUTPATIENT
Start: 2023-07-05 | End: 2023-11-06 | Stop reason: SDUPTHER

## 2023-07-09 ENCOUNTER — PHARMACY VISIT (OUTPATIENT)
Dept: PHARMACY | Facility: MEDICAL CENTER | Age: 23
End: 2023-07-09
Payer: COMMERCIAL

## 2023-07-17 ENCOUNTER — HOSPITAL ENCOUNTER (OUTPATIENT)
Dept: INFUSION CENTER | Facility: MEDICAL CENTER | Age: 23
End: 2023-07-17
Attending: PEDIATRICS
Payer: COMMERCIAL

## 2023-07-17 ENCOUNTER — PHARMACY VISIT (OUTPATIENT)
Dept: PHARMACY | Facility: MEDICAL CENTER | Age: 23
End: 2023-07-17
Payer: COMMERCIAL

## 2023-07-17 VITALS
RESPIRATION RATE: 18 BRPM | SYSTOLIC BLOOD PRESSURE: 103 MMHG | TEMPERATURE: 99.3 F | DIASTOLIC BLOOD PRESSURE: 59 MMHG | WEIGHT: 140.21 LBS | HEIGHT: 65 IN | OXYGEN SATURATION: 100 % | BODY MASS INDEX: 23.36 KG/M2 | HEART RATE: 77 BPM

## 2023-07-17 DIAGNOSIS — T45.1X5A CHEMOTHERAPY INDUCED NAUSEA AND VOMITING: ICD-10-CM

## 2023-07-17 DIAGNOSIS — R11.2 CHEMOTHERAPY INDUCED NAUSEA AND VOMITING: ICD-10-CM

## 2023-07-17 DIAGNOSIS — C91.Z0 B LYMPHOBLASTIC LEUKEMIA WITH T(9;22)(Q34;Q11.2);BCR-ABL1 (HCC): ICD-10-CM

## 2023-07-17 DIAGNOSIS — C91.01 ACUTE LYMPHOBLASTIC LEUKEMIA (ALL) IN REMISSION (HCC): ICD-10-CM

## 2023-07-17 DIAGNOSIS — Z51.11 ENCOUNTER FOR CHEMOTHERAPY MANAGEMENT: ICD-10-CM

## 2023-07-17 LAB
ALBUMIN SERPL BCP-MCNC: 4 G/DL (ref 3.2–4.9)
ALBUMIN/GLOB SERPL: 2 G/DL
ALP SERPL-CCNC: 111 U/L (ref 30–99)
ALT SERPL-CCNC: 72 U/L (ref 2–50)
ANION GAP SERPL CALC-SCNC: 12 MMOL/L (ref 7–16)
ANISOCYTOSIS BLD QL SMEAR: ABNORMAL
AST SERPL-CCNC: 30 U/L (ref 12–45)
BASOPHILS # BLD AUTO: 0 % (ref 0–1.8)
BASOPHILS # BLD: 0 K/UL (ref 0–0.12)
BILIRUB CONJ SERPL-MCNC: <0.2 MG/DL (ref 0.1–0.5)
BILIRUB INDIRECT SERPL-MCNC: NORMAL MG/DL (ref 0–1)
BILIRUB SERPL-MCNC: 0.5 MG/DL (ref 0.1–1.5)
BUN SERPL-MCNC: 9 MG/DL (ref 8–22)
CALCIUM ALBUM COR SERPL-MCNC: 8.6 MG/DL (ref 8.5–10.5)
CALCIUM SERPL-MCNC: 8.6 MG/DL (ref 8.5–10.5)
CHLORIDE SERPL-SCNC: 106 MMOL/L (ref 96–112)
CO2 SERPL-SCNC: 20 MMOL/L (ref 20–33)
COMMENT NL1176: NORMAL
CREAT SERPL-MCNC: 0.46 MG/DL (ref 0.5–1.4)
EOSINOPHIL # BLD AUTO: 0.05 K/UL (ref 0–0.51)
EOSINOPHIL NFR BLD: 5 % (ref 0–6.9)
ERYTHROCYTE [DISTWIDTH] IN BLOOD BY AUTOMATED COUNT: 71.4 FL (ref 35.9–50)
GFR SERPLBLD CREATININE-BSD FMLA CKD-EPI: 152 ML/MIN/1.73 M 2
GLOBULIN SER CALC-MCNC: 2 G/DL (ref 1.9–3.5)
GLUCOSE SERPL-MCNC: 103 MG/DL (ref 65–99)
HCT VFR BLD AUTO: 26.6 % (ref 42–52)
HGB BLD-MCNC: 8.5 G/DL (ref 14–18)
LYMPHOCYTES # BLD AUTO: 0.18 K/UL (ref 1–4.8)
LYMPHOCYTES NFR BLD: 20 % (ref 22–41)
MACROCYTES BLD QL SMEAR: ABNORMAL
MANUAL DIFF BLD: NORMAL
MCH RBC QN AUTO: 37.1 PG (ref 27–33)
MCHC RBC AUTO-ENTMCNC: 32 G/DL (ref 32.3–36.5)
MCV RBC AUTO: 116.2 FL (ref 81.4–97.8)
METAMYELOCYTES NFR BLD MANUAL: 2.5 %
MONOCYTES # BLD AUTO: 0.06 K/UL (ref 0–0.85)
MONOCYTES NFR BLD AUTO: 6.7 % (ref 0–13.4)
MORPHOLOGY BLD-IMP: NORMAL
NEUTROPHILS # BLD AUTO: 0.59 K/UL (ref 1.82–7.42)
NEUTROPHILS NFR BLD: 65 % (ref 44–72)
NEUTS BAND NFR BLD MANUAL: 0.8 % (ref 0–10)
NRBC # BLD AUTO: 0 K/UL
NRBC BLD-RTO: 0 /100 WBC (ref 0–0.2)
PLATELET # BLD AUTO: 110 K/UL (ref 164–446)
PLATELET BLD QL SMEAR: NORMAL
PMV BLD AUTO: 10.1 FL (ref 9–12.9)
POTASSIUM SERPL-SCNC: 3.9 MMOL/L (ref 3.6–5.5)
PROT SERPL-MCNC: 6 G/DL (ref 6–8.2)
RBC # BLD AUTO: 2.29 M/UL (ref 4.7–6.1)
RBC BLD AUTO: PRESENT
SODIUM SERPL-SCNC: 138 MMOL/L (ref 135–145)
WBC # BLD AUTO: 0.9 K/UL (ref 4.8–10.8)
WBC TOXIC VACUOLES BLD QL SMEAR: NORMAL

## 2023-07-17 PROCEDURE — 700105 HCHG RX REV CODE 258: Mod: UD | Performed by: PEDIATRICS

## 2023-07-17 PROCEDURE — 85025 COMPLETE CBC W/AUTO DIFF WBC: CPT

## 2023-07-17 PROCEDURE — RXMED WILLOW AMBULATORY MEDICATION CHARGE: Performed by: PEDIATRICS

## 2023-07-17 PROCEDURE — 99214 OFFICE O/P EST MOD 30 MIN: CPT | Performed by: PEDIATRICS

## 2023-07-17 PROCEDURE — 36591 DRAW BLOOD OFF VENOUS DEVICE: CPT

## 2023-07-17 PROCEDURE — 700111 HCHG RX REV CODE 636 W/ 250 OVERRIDE (IP): Mod: JZ,UD | Performed by: PEDIATRICS

## 2023-07-17 PROCEDURE — 96409 CHEMO IV PUSH SNGL DRUG: CPT

## 2023-07-17 PROCEDURE — 85007 BL SMEAR W/DIFF WBC COUNT: CPT

## 2023-07-17 PROCEDURE — A4212 NON CORING NEEDLE OR STYLET: HCPCS

## 2023-07-17 PROCEDURE — 96375 TX/PRO/DX INJ NEW DRUG ADDON: CPT

## 2023-07-17 PROCEDURE — 82248 BILIRUBIN DIRECT: CPT

## 2023-07-17 PROCEDURE — 80053 COMPREHEN METABOLIC PANEL: CPT

## 2023-07-17 RX ORDER — MERCAPTOPURINE 50 MG/1
TABLET ORAL
Qty: 72 TABLET | Refills: 5 | Status: SHIPPED | OUTPATIENT
Start: 2023-07-17 | End: 2023-10-09 | Stop reason: SDUPTHER

## 2023-07-17 RX ORDER — 0.9 % SODIUM CHLORIDE 0.9 %
20 VIAL (ML) INJECTION PRN
Status: CANCELLED | OUTPATIENT
Start: 2023-07-17

## 2023-07-17 RX ORDER — ONDANSETRON 2 MG/ML
8 INJECTION INTRAMUSCULAR; INTRAVENOUS ONCE
Status: COMPLETED | OUTPATIENT
Start: 2023-07-17 | End: 2023-07-17

## 2023-07-17 RX ORDER — HEPARIN SODIUM,PORCINE 10 UNIT/ML
30 VIAL (ML) INTRAVENOUS PRN
Status: CANCELLED | OUTPATIENT
Start: 2023-07-17

## 2023-07-17 RX ORDER — HEPARIN SODIUM (PORCINE) LOCK FLUSH IV SOLN 100 UNIT/ML 100 UNIT/ML
500 SOLUTION INTRAVENOUS PRN
Status: CANCELLED | OUTPATIENT
Start: 2023-07-17

## 2023-07-17 RX ORDER — PREDNISONE 10 MG/1
TABLET ORAL
Qty: 35 TABLET | Refills: 6 | Status: SHIPPED | OUTPATIENT
Start: 2023-07-17 | End: 2023-10-09 | Stop reason: SDUPTHER

## 2023-07-17 RX ORDER — LORAZEPAM 1 MG/1
1 TABLET ORAL EVERY 6 HOURS PRN
Qty: 28 TABLET | Refills: 0 | Status: SHIPPED | OUTPATIENT
Start: 2023-07-17 | End: 2023-07-24

## 2023-07-17 RX ORDER — FAMOTIDINE 20 MG/1
20 TABLET, FILM COATED ORAL 2 TIMES DAILY
Qty: 60 TABLET | Refills: 1 | Status: SHIPPED | OUTPATIENT
Start: 2023-07-17 | End: 2023-09-12

## 2023-07-17 RX ADMIN — VINCRISTINE SULFATE 2 MG: 1 INJECTION, SOLUTION INTRAVENOUS at 15:50

## 2023-07-17 RX ADMIN — ONDANSETRON 8 MG: 2 INJECTION INTRAMUSCULAR; INTRAVENOUS at 15:43

## 2023-07-17 RX ADMIN — HEPARIN 500 UNITS: 100 SYRINGE at 15:57

## 2023-07-17 ASSESSMENT — FIBROSIS 4 INDEX: FIB4 SCORE: 0.7

## 2023-07-17 NOTE — PROGRESS NOTES
"Pharmacy Chemotherapy Calculations Note:    Dx: B-ALL (CNS3)  Cycle:  1 Maintenance Day 57   Previous treatment: Maint C1D29 on 6/19/23     Protocol: Maintenance per Arm B of [id number] [facility] ID number: [id number]       **JULY did receive cranial radiation, no LP on D29       Ht 1.659 m (5' 5.32\")   Wt 63.6 kg (140 lb 3.4 oz)   BMI 23.11 kg/m²  Body surface area is 1.71 meters squared.    MD to review any ordered labs         Vincristine 1.5 mg/m² (max 2 mg) x 1.71 m² = 2.56 mg   Max 2 mg, ok for final dose = 2 mg IV        Judy Bryant, PharmD, BCOP            "

## 2023-07-17 NOTE — PROGRESS NOTES
"Pharmacy Chemotherapy Verification    Patient Name: Tomas Jean-Baptiste   Dx: pH+ B-Cell ALL         Protocol: Capizzi MTX IM (On Study Arm B, ID number: [id number])         Allergies:  Amoxicillin     Ht 1.659 m (5' 5.32\")   Wt 63.6 kg (140 lb 3.4 oz)   BMI 23.11 kg/m²    Body surface area is 1.71 meters squared.    MD to review any labs.    Drug Order   (Drug name, dose, route, IV Fluid & volume, frequency, number of doses) Cycle: 1 maintenance D57  Previous treatment: Maint D 29 6/19/23     Medication = Vincristine (ONCOVIN)   Base Dose= 1.5 mg/m2  Calc Dose: Base Dose x 1.71m2 = >2mg  Final Dose = 2 mg (MAX)   Route = IV  Fluid & Volume = NS 25 mL  Admin Duration = Over 5 - 10 minutes    Days 1, 29, 57      <10% difference, okay to treat with final max dose   By my signature below, I confirm this process was performed independently with the BSA and all final chemotherapy dosing calculations congruent. I have reviewed the above chemotherapy order and that my calculation of the final dose and BSA (when applicable) corroborate those calculations of the  pharmacist. Discrepancies of 10% or greater in the written dose have been addressed and documented within the [facility] Progress notes.    Galen Wilson PharmD  "

## 2023-07-17 NOTE — PROGRESS NOTES
Eliana from Lab called with critical result of WBC 0.9 at 1447. Critical lab result read back to Eliana.   Dr. Faye notified of critical lab result at 1450.  Critical lab result read back by Dr. Faye.

## 2023-07-17 NOTE — PROGRESS NOTES
Pt to [facility] for lab draw, doctors office visit, and chemotherapy administration.      Afebrile.  VSS.  Awake and alert in no acute distress.      Port accessed using a 22 g 3/4 inch trevino needle with 1 attempt.  Labs drawn from the port without difficulty.   Pt tolerated well.      Dr. Faye to bedside, visit completed. Labs reviewed, okay to proceed with chemotherapy today.     Chemotherapy dosage calculated independently by Dayna Damian, MONTSERRAT and Cherrie Urbano RN and compared to road map for protocol COG [id number], Arm B.  Calculations within 10% of written order.     Premedications and chemo given as ordered, see MAR.  Blood return verified prior to, during, and after chemotherapy infusion.  See Chemotherapy flowsheet.  PT tolerated well.  No side effects or complications noted.  Port flushed per orders (see MAR) and de-accessed after completion. PT home with self/mother.  Will return for next visit on 08/14/2023.

## 2023-07-18 ENCOUNTER — TELEPHONE (OUTPATIENT)
Dept: INFUSION CENTER | Facility: MEDICAL CENTER | Age: 23
End: 2023-07-18
Payer: MEDICAID

## 2023-07-18 NOTE — TELEPHONE ENCOUNTER
Discharge phone call completed. Pt complains of upset stomach but has medications needed to help support him. Pt encouraged to reach out  for any concerns, new or worsening symptoms.

## 2023-07-27 ENCOUNTER — HOSPITAL ENCOUNTER (OUTPATIENT)
Dept: INFUSION CENTER | Facility: MEDICAL CENTER | Age: 23
End: 2023-07-27
Attending: PEDIATRICS
Payer: COMMERCIAL

## 2023-07-27 VITALS
DIASTOLIC BLOOD PRESSURE: 67 MMHG | SYSTOLIC BLOOD PRESSURE: 117 MMHG | TEMPERATURE: 98 F | HEIGHT: 65 IN | HEART RATE: 81 BPM | RESPIRATION RATE: 18 BRPM | WEIGHT: 143.52 LBS | OXYGEN SATURATION: 97 % | BODY MASS INDEX: 23.91 KG/M2

## 2023-07-27 DIAGNOSIS — C91.01 ACUTE LYMPHOBLASTIC LEUKEMIA (ALL) IN REMISSION (HCC): ICD-10-CM

## 2023-07-27 DIAGNOSIS — Z51.11 ENCOUNTER FOR CHEMOTHERAPY MANAGEMENT: ICD-10-CM

## 2023-07-27 DIAGNOSIS — C91.Z0 B LYMPHOBLASTIC LEUKEMIA WITH T(9;22)(Q34;Q11.2);BCR-ABL1 (HCC): ICD-10-CM

## 2023-07-27 LAB
ALBUMIN SERPL BCP-MCNC: 3.6 G/DL (ref 3.2–4.9)
ALBUMIN/GLOB SERPL: 1.9 G/DL
ALP SERPL-CCNC: 80 U/L (ref 30–99)
ALT SERPL-CCNC: 154 U/L (ref 2–50)
ANION GAP SERPL CALC-SCNC: 9 MMOL/L (ref 7–16)
ANISOCYTOSIS BLD QL SMEAR: ABNORMAL
AST SERPL-CCNC: 68 U/L (ref 12–45)
BASOPHILS # BLD AUTO: 0.6 % (ref 0–1.8)
BASOPHILS # BLD: 0 K/UL (ref 0–0.12)
BILIRUB SERPL-MCNC: 0.6 MG/DL (ref 0.1–1.5)
BUN SERPL-MCNC: 7 MG/DL (ref 8–22)
CALCIUM ALBUM COR SERPL-MCNC: 8.5 MG/DL (ref 8.5–10.5)
CALCIUM SERPL-MCNC: 8.2 MG/DL (ref 8.5–10.5)
CHLORIDE SERPL-SCNC: 105 MMOL/L (ref 96–112)
CO2 SERPL-SCNC: 25 MMOL/L (ref 20–33)
COMMENT NL1176: NORMAL
CREAT SERPL-MCNC: 0.45 MG/DL (ref 0.5–1.4)
EOSINOPHIL # BLD AUTO: 0.03 K/UL (ref 0–0.51)
EOSINOPHIL NFR BLD: 8 % (ref 0–6.9)
ERYTHROCYTE [DISTWIDTH] IN BLOOD BY AUTOMATED COUNT: 65.5 FL (ref 35.9–50)
GFR SERPLBLD CREATININE-BSD FMLA CKD-EPI: 153 ML/MIN/1.73 M 2
GLOBULIN SER CALC-MCNC: 1.9 G/DL (ref 1.9–3.5)
GLUCOSE SERPL-MCNC: 116 MG/DL (ref 65–99)
HCT VFR BLD AUTO: 26.4 % (ref 42–52)
HGB BLD-MCNC: 8.8 G/DL (ref 14–18)
LG PLATELETS BLD QL SMEAR: NORMAL
LYMPHOCYTES # BLD AUTO: 0.19 K/UL (ref 1–4.8)
LYMPHOCYTES NFR BLD: 46.7 % (ref 22–41)
MACROCYTES BLD QL SMEAR: ABNORMAL
MAGNESIUM SERPL-MCNC: 2 MG/DL (ref 1.5–2.5)
MANUAL DIFF BLD: NORMAL
MCH RBC QN AUTO: 38.1 PG (ref 27–33)
MCHC RBC AUTO-ENTMCNC: 33.3 G/DL (ref 32.3–36.5)
MCV RBC AUTO: 114.3 FL (ref 81.4–97.8)
MONOCYTES # BLD AUTO: 0.01 K/UL (ref 0–0.85)
MONOCYTES NFR BLD AUTO: 2.7 % (ref 0–13.4)
MORPHOLOGY BLD-IMP: NORMAL
NEUTROPHILS # BLD AUTO: 0.17 K/UL (ref 1.82–7.42)
NEUTROPHILS NFR BLD: 40 % (ref 44–72)
NEUTS BAND NFR BLD MANUAL: 2 % (ref 0–10)
NRBC # BLD AUTO: 0 K/UL
NRBC BLD-RTO: 0 /100 WBC (ref 0–0.2)
OVALOCYTES BLD QL SMEAR: NORMAL
PHOSPHATE SERPL-MCNC: 2 MG/DL (ref 2.5–4.5)
PLATELET # BLD AUTO: 125 K/UL (ref 164–446)
PLATELET BLD QL SMEAR: NORMAL
PMV BLD AUTO: 11.7 FL (ref 9–12.9)
POIKILOCYTOSIS BLD QL SMEAR: NORMAL
POTASSIUM SERPL-SCNC: 3.7 MMOL/L (ref 3.6–5.5)
PROT SERPL-MCNC: 5.5 G/DL (ref 6–8.2)
RBC # BLD AUTO: 2.31 M/UL (ref 4.7–6.1)
RBC BLD AUTO: PRESENT
SODIUM SERPL-SCNC: 139 MMOL/L (ref 135–145)
WBC # BLD AUTO: 0.4 K/UL (ref 4.8–10.8)

## 2023-07-27 PROCEDURE — 36591 DRAW BLOOD OFF VENOUS DEVICE: CPT

## 2023-07-27 PROCEDURE — 96361 HYDRATE IV INFUSION ADD-ON: CPT

## 2023-07-27 PROCEDURE — 85025 COMPLETE CBC W/AUTO DIFF WBC: CPT

## 2023-07-27 PROCEDURE — 700111 HCHG RX REV CODE 636 W/ 250 OVERRIDE (IP): Mod: UD | Performed by: PEDIATRICS

## 2023-07-27 PROCEDURE — 80053 COMPREHEN METABOLIC PANEL: CPT

## 2023-07-27 PROCEDURE — 83735 ASSAY OF MAGNESIUM: CPT

## 2023-07-27 PROCEDURE — 99214 OFFICE O/P EST MOD 30 MIN: CPT | Performed by: PEDIATRICS

## 2023-07-27 PROCEDURE — 85007 BL SMEAR W/DIFF WBC COUNT: CPT

## 2023-07-27 PROCEDURE — A4212 NON CORING NEEDLE OR STYLET: HCPCS

## 2023-07-27 PROCEDURE — 96374 THER/PROPH/DIAG INJ IV PUSH: CPT

## 2023-07-27 PROCEDURE — 700105 HCHG RX REV CODE 258: Mod: UD | Performed by: PEDIATRICS

## 2023-07-27 PROCEDURE — 84100 ASSAY OF PHOSPHORUS: CPT

## 2023-07-27 PROCEDURE — 96375 TX/PRO/DX INJ NEW DRUG ADDON: CPT

## 2023-07-27 RX ORDER — SODIUM CHLORIDE 9 MG/ML
1000 INJECTION, SOLUTION INTRAVENOUS ONCE
Status: COMPLETED | OUTPATIENT
Start: 2023-07-27 | End: 2023-07-27

## 2023-07-27 RX ORDER — 0.9 % SODIUM CHLORIDE 0.9 %
20 VIAL (ML) INJECTION PRN
Status: CANCELLED | OUTPATIENT
Start: 2023-07-27

## 2023-07-27 RX ORDER — LORAZEPAM 2 MG/ML
1 INJECTION INTRAMUSCULAR ONCE
Status: CANCELLED
Start: 2023-07-27 | End: 2023-07-27

## 2023-07-27 RX ORDER — HEPARIN SODIUM,PORCINE 10 UNIT/ML
30 VIAL (ML) INTRAVENOUS PRN
Status: CANCELLED | OUTPATIENT
Start: 2023-07-27

## 2023-07-27 RX ORDER — HEPARIN SODIUM (PORCINE) LOCK FLUSH IV SOLN 100 UNIT/ML 100 UNIT/ML
500 SOLUTION INTRAVENOUS PRN
Status: CANCELLED | OUTPATIENT
Start: 2023-07-27

## 2023-07-27 RX ORDER — ONDANSETRON 2 MG/ML
8 INJECTION INTRAMUSCULAR; INTRAVENOUS ONCE
Status: CANCELLED | OUTPATIENT
Start: 2023-07-27

## 2023-07-27 RX ORDER — ONDANSETRON 2 MG/ML
8 INJECTION INTRAMUSCULAR; INTRAVENOUS ONCE
Status: COMPLETED | OUTPATIENT
Start: 2023-07-27 | End: 2023-07-27

## 2023-07-27 RX ORDER — LORAZEPAM 2 MG/ML
1 INJECTION INTRAMUSCULAR ONCE
Status: COMPLETED | OUTPATIENT
Start: 2023-07-27 | End: 2023-07-27

## 2023-07-27 RX ORDER — SODIUM CHLORIDE 9 MG/ML
1000 INJECTION, SOLUTION INTRAVENOUS ONCE
Status: CANCELLED
Start: 2023-07-27

## 2023-07-27 RX ADMIN — LORAZEPAM 1 MG: 2 INJECTION INTRAMUSCULAR; INTRAVENOUS at 09:22

## 2023-07-27 RX ADMIN — ONDANSETRON 8 MG: 2 INJECTION INTRAMUSCULAR; INTRAVENOUS at 09:25

## 2023-07-27 RX ADMIN — HEPARIN 500 UNITS: 100 SYRINGE at 11:04

## 2023-07-27 RX ADMIN — SODIUM CHLORIDE 1000 ML: 9 INJECTION, SOLUTION INTRAVENOUS at 09:21

## 2023-07-27 ASSESSMENT — FIBROSIS 4 INDEX: FIB4 SCORE: 0.67

## 2023-07-27 NOTE — PROGRESS NOTES
Merced from Lab called with critical result of WBC  0.4 at 0936. Critical lab result read back to [place].   Dr. Fyae notified of critical lab result at 0937.  Critical lab result read back by Dr. Faye.

## 2023-07-27 NOTE — PROGRESS NOTES
Pt to [facility] for sick  visit.     Pt arrived with complaints of  nausea/vomiting/diarrhea.   Awake and alert. Accompanied by  mother.       Dr. Duenas to bedside.  Orders  received.    Port  accessed using a  22G 3/4 inch trevino needle and labs drawn from the port without difficulty / with 1 attempt.  Pt tolerated well.      NS  bolus  started 0921.     Ativan IV given  x1   0922.  Zofran  IV  given x1 0925.    Dr. Faye to  bedside. Visit  completed.     Bolus completed at 1022. No further emesis noted.    No further orders at this time. Okay to discharge patient home. Plan to follow up 8/2/23 for labs and follow up office visit.

## 2023-07-27 NOTE — PROGRESS NOTES
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 having some abdominal discomfort, nausea and had vomited 3 times throughout the day.  He also reported a decrease in his over the same period of time.  Given concerns for worsening clinical status, he was brought in for evaluation today.    On presentation, JULY reports that he is feeling much more fatigued than he had felt last week.  He reports that he has had some abdominal discomfort, nausea and has vomited 3 times in the past 24 hours.  He does report however that after trying to hydrate better last night, he is slightly improved this morning.  He denies any headaches, changes in vision or neurologic status changes.  Neuropathies are stable at this time.  Not complaining of any cough, congestion or signs and symptoms of respiratory illness.  No complaints of any diarrhea or constipation.  Stooling and voiding normally. Tomas Roy does have decreased energy  and has certainly had considerably less activity in the past couple of days.  He has also had some low mood now for several weeks. Tomas Roy reports that he has remained afebrile and has not had any symptoms to include chills or sweats.  Has continued to be 100% adherent with his imatinib as well as mercaptopurine and methotrexate.    Review of Systems:     Constitutional:  Afebrile.  Significantly decreased energy and activity.  Decreased appetite.  HENT: Negative.  Eyes: Negative for visual changes.  Respiratory: Negative for shortness of breath.  No cough.  Cardiovascular: Negative.  Gastrointestinal: Nausea with vomiting x3.  No diarrhea.  No constipation or abdominal discomfort.  Genitourinary: Negative.  Musculoskeletal: Negative for joint or muscle pain.  Skin: Negative for rash, signs of infection.  Neurological: Negative for numbness, tingling, sensory changes, weakness or headaches.    Endo/Heme/Allergies: Does not bruise/bleed easily.    Psychiatric/Behavioral: Depressed.    PAST MEDICAL HISTORY:     Oncologic History:  2-3 week history of worsening fatigue, right lower extremity pain  Presentation to OS and diagnosed with right LE superficial thrombus, subsegmental PE and hyperleukocytosis, anemia and thrombocytopenia  Transferred to [facility] for [facility] care  Presenting (local) WBC > 440K, Hgb 10.0, platelets 53, (automated differential ANC 3190, ALC 75,310, absolute monocyte count 00045, absolute blast count 340,560)  Uric Acid 15.6, phosphorus 5.6, LDH 1114  Rasburicase x 1 dose given   Peripheral Blood flow cytometry demonstrating CD10 pos, CD19 pos, CD20 neg, CD22 dim (60%) 5/28/2022  Peripheral blood FISH for BCR-ABL1 positive in 98% of analyzed cells     Age at Diagnosis: 21 years  White Blood Cell Count at Presentation: > 440 k/uL  Testicular Disease Status: Negative (see procedure note 5/30/2022)  CNS Status: 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, diagnostic LP with WBC 1, RBC 3  and no evidence of leukemic blasts 5/30/2022  Steroid Pre-treatment: None  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     All inclusion/exclusion criteria for [id number] met and consent signed - enrolled 5/29/2022   All inclusion/exclusion criteria for [id number] met and consent signed - enrolled 5/30/2022  Confirmatory bone marrow aspirate and biopsy and diagnostic LP + cytarabine 70 mg IT 5/30/2022  Induction therapy (ON STUDY [id number]) started 5/30/2022  Bone marrow immunohistochemistry consistent with diagnosis of B-ALL comprising 90% of marrow cellularity  Bone marrow sample sent to [facility] for [facility] purposes:  Flow cytom  Of the blood pressure little high that is a problem is a cultural problem is well and cultural genetic and everything else like that unfortunately breathalyzers such bad heart disease diabetes things like that  populations etry consistent with peripheral blood, cytogenetics remarkable for known t(9;22)  CSF with WBC 1, RBC 3, no blasts identified on cytospin  FISH results available 5/31/2022 making patient eligible for transfer from [place] [address] to [place] [address] as eligibility requirements were met for [place] [address]  Patient unenrolled from [place] [address] (BCR-ABL1 fusion positive) 6/1/2022  Consent signed for [place] [address] and patient enrolled 6/1/2022 (see eligibility checklist from 5/31/2022 and consent note from 6/1/2022)  Imatinib 400 mg PO QAM / 200 mg PO QPM started 6/3/2022 (allowed per [place] [address])  Patient completed the first 15 days of a Standard 4-drug Induction on 6/13/2022  Start of [facility] [id number](OS), Induction IA Part 2, Day 15 6/13/2022  End of Induction 1A Part 2 - MRD negative  Start of [facility] [id number](OS), Induction IA Part 2, Day 15 7/5/2022  Induction IB DELAYED 2 weeks 14 days from 7/26/2022-8/9/2022) for myelosuppression - Start of Day 22 cytarabine block 8/9/2022  Induction IB Day 42 delayed 9 days for myelosuppression -  "Day 42 evaluations 9/7/2022  End of Induction IB - Flow cytometric MRD negative, MRD by IgH-TCR PCR [id number]%  Randomization to AR-Experimental Arm B of [id number]  Start of AR-Experimental Arm B, Interim Maintenance 9/29/2022  Weight based increase in dose of imatinib to 400 mg PO AM and 250 mg PM 9/29/2022  Thrombocytopenia not permissive of proceeding with Day 15 of Interim Maintenance  AR-Experimental Arm B, Interim Maintenance, Day 15 delayed 4 days, start 10/17/2022  AR-Experimental Arm B, Interim Maintenance, Day 29, start 11/1/2022  AR-Experimental Arm B, Interim Maintenance, Day 43, start 11/14/2022  Last does of 6-MP 11/27/2022  AR-Experimental Arm B, Delayed Intensification, Day 1, start 12/6/2022  Admission with Severe Septic Shock 12/27/2022  Imatinib HELD 12/27/2022-1/8/2023  AR-Experimental Arm B, Delayed Intensification, Day 29 DELAYED 14 days with start 1/17/2023  Hospitalization 2/7/2023 on Day 50 of Delayed Intensification with left shoulder pain ultimately diagnosed with Bacteroides fragilis infection  AR-Experimental Arm B, Interim Maintenance, Day 1 DELAYED 7 days with start 2/27/2023  AR-Experimental Arm B, Interim Maintenance, Day 11 DELAYED 4 days for platelets 43K on 3/9/2023  AR-Experimental Arm B, Interim Maintenance, Day 11 VCR given and MTX omitted for platelets 43K 3/13/2023  Imatinib HELD 3/30/2023-4/11/2023  AR-Experimental Arm B, Interim Maintenance, Day 21 (DELAYED 22 DAYS) - administered 4/11/2023 with methotrexate 80 mg/m² IV  AR-Experimental Arm B, Interim Maintenance, Day 31 (\"true\" Day 31 4/25/2023) - VCR and IT MTX given 4/28/2023 - IV MTX omitted  AR-Experimental Arm B, Interim Maintenance, Day 41 met with counts - administered 5/5/2023 with methotrexate 80 mg/m² IV     Start of Maintenance, Cycle 1, Day 1 -5/22/2023  Cranial radiation 6/5/2023-6/16/2023 (10 fractions)  Maintenance, Cycle 1, Day 29 - 6/23/2023 (no IT MTX given)  Maintenance, Cycle 1, Day 67, presented with " fatigue nausea and vomiting found to have ANC less than 500  Methotrexate (oral) and mercaptopurine held 7/27/2023 - continued with imatinib     Past Medical History:    1) Previously Healthy  2) 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  3) Hyperleukocytosis  4) Hyperuricemia  5) Hyperphosphatemia  6) Right Lower Extremity Superficial Thrombus  7) Subsegmental Pulmonary Embolism  8) 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  9) Bacteroides fragilis soft tissue infection left shoulder     Past Surgical History:     1) Temporary Right IJ Pharesis Catheter Placement 5/28/2022  2) Right-sided Port-A-Cath placement 8/29/2022  3) IR drainage left calf hematoma  4) Left shoulder I&D  5) Cranial XRT 6/5/2023-6/16/2023     Birth/Developmental History:   1st of three children  Unremarkable pregnancy  Unremarkable delivery     Allergies:             Allergies as of 05/27/2022 - Reviewed 05/27/2022   Allergen Reaction Noted    Amoxicillin   04/03/2020      Social History:   Lives at home with mother, brother and sister.  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   Two dogs. Father not involved.     Family History:     Family History             Family History   Problem Relation Age of Onset    No Known Problems Mother      Diabetes Paternal Grandfather      Hypertension Paternal Grandfather      Hyperlipidemia Paternal Grandfather      Cancer Neg Hx      Heart Disease Neg Hx      Stroke Neg Hx           No significant family history of cancer.  Both maternal and paternal family history of diabetes.     Immunizations:  UTD    Medications:   Current Outpatient Medications on File Prior to Encounter   Medication Sig Dispense Refill    mercaptopurine (PURINETHOL) 50 MG Tab Take 2.5 tablets by mouth in the evening x 6 days and 3 tablets by mouth in the evening x 1 day each week. 72 Tablet 5    predniSONE (DELTASONE) 10 MG Tab Take 3.5 tablets by mouth in the morning and evening for 5 Days at Day 1, 29 and 57 of each cycle. 35 Tablet 6     "famotidine (PEPCID) 20 MG Tab Take 1 Tablet by mouth 2 times a day. 60 Tablet 1    methotrexate 2.5 MG Tab Take 14 Tablets (all at the same time) by mouth every 7 days on weeks when not receiving lumbar puncture 56 Tablet 5    imatinib (GLEEVEC) 400 MG tablet Take 1 Tablet by mouth every day. Pt takes 200 mg + 400 mg for a total dose of 600 mg daily 30 Tablet 5    imatinib (GLEEVEC) 100 MG tablet Take 2 Tablets by mouth every day. Pt takes 200 mg + 400 mg for a total dose of 600 mg daily 60 Tablet 5    Gabapentin, Once-Daily, 300 MG Tab Take 300 mg by mouth 3 times a day. 180 Tablet 0    sulfamethoxazole-trimethoprim (BACTRIM DS) 800-160 MG tablet Take 2 Tablets by mouth twice daily two days a week. 48 Tablet 11    Melatonin 5 MG Cap Take 5 mg by mouth at bedtime as needed.      ondansetron (ZOFRAN ODT) 8 MG TABLET DISPERSIBLE Dissolve 1 Tablet by mouth every 6 hours as needed for Nausea/Vomiting. 10 Tablet 0     No current facility-administered medications on file prior to encounter.     OBJECTIVE:     Vitals:   /71   Pulse 98   Temp 36.7 °C (98 °F) (Temporal)   Resp 18   Ht 1.661 m (5' 5.39\")   Wt 65.1 kg (143 lb 8.3 oz)   SpO2 100%     Labs:  Hospital Outpatient Visit on 07/27/2023   Component Date Value    Phosphorus 07/27/2023 2.0 (L)     Magnesium 07/27/2023 2.0     Sodium 07/27/2023 139     Potassium 07/27/2023 3.7     Chloride 07/27/2023 105     Co2 07/27/2023 25     Anion Gap 07/27/2023 9.0     Glucose 07/27/2023 116 (H)     Bun 07/27/2023 7 (L)     Creatinine 07/27/2023 0.45 (L)     Calcium 07/27/2023 8.2 (L)     Correct Calcium 07/27/2023 8.5     AST(SGOT) 07/27/2023 68 (H)     ALT(SGPT) 07/27/2023 154 (H)     Alkaline Phosphatase 07/27/2023 80     Total Bilirubin 07/27/2023 0.6     Albumin 07/27/2023 3.6     Total Protein 07/27/2023 5.5 (L)     Globulin 07/27/2023 1.9     A-G Ratio 07/27/2023 1.9     WBC 07/27/2023 0.4 (LL)     RBC 07/27/2023 2.31 (L)     Hemoglobin 07/27/2023 8.8 (L)     " Hematocrit 07/27/2023 26.4 (L)     MCV 07/27/2023 114.3 (H)     MCH 07/27/2023 38.1 (H)     MCHC 07/27/2023 33.3     RDW 07/27/2023 65.5 (H)     Platelet Count 07/27/2023 125 (L)     MPV 07/27/2023 11.7     Neutrophils-Polys 07/27/2023 40.00 (L)     Lymphocytes 07/27/2023 46.70 (H)     Monocytes 07/27/2023 2.70     Eosinophils 07/27/2023 8.00 (H)     Basophils 07/27/2023 0.60     Nucleated RBC 07/27/2023 0.00     Neutrophils (Absolute) 07/27/2023 0.17 (LL)     Lymphs (Absolute) 07/27/2023 0.19 (L)     Monos (Absolute) 07/27/2023 0.01     Eos (Absolute) 07/27/2023 0.03     Baso (Absolute) 07/27/2023 0.00     NRBC (Absolute) 07/27/2023 0.00     Anisocytosis 07/27/2023 1+     Macrocytosis 07/27/2023 1+     GFR (CKD-EPI) 07/27/2023 153     Bands-Stabs 07/27/2023 2.00     Manual Diff Status 07/27/2023 PERFORMED     Comment 07/27/2023 See Comment     Peripheral Smear Review 07/27/2023 see below     Plt Estimation 07/27/2023 Decreased     RBC Morphology 07/27/2023 Present     Large Platelets 07/27/2023 1+     Poikilocytosis 07/27/2023 1+     Ovalocytes 07/27/2023 1+       Physical Exam:    Constitutional: Well-developed, well-nourished, and in no distress.  Tired and flat appearing.  HENT: Normocephalic and atraumatic.  Alopecia.  No nasal congestion or rhinorrhea. Oropharynx is clear and moist. No oral ulcerations or sores.    Eyes: Conjunctivae are normal. Pupils are equal, round.  EOMI.  Nonicteric.  Neck: Normal range of motion of neck, no adenopathy.    Cardiovascular: Normal rate, regular rhythm and normal heart sounds.  No murmur heard. DP/radial pulses 2+, cap refill < 3 sec.  Pulmonary/Chest: Effort normal and breath sounds normal. No respiratory distress. Symmetric expansion.  No crackles or wheezes.  Abdomen: Soft. Bowel sounds are normal. No distension and no mass. There is no hepatosplenomegaly.    Genitourinary:  Deferred.  Musculoskeletal: Normal range of motion of lower and upper extremities bilaterally.    Neurological: Alert and oriented to person and place. Exhibits normal muscle tone bilaterally in upper and lower extremities. Gait normal. Coordination normal.    Skin: Skin is warm, dry and pink.  No rash or evidence of skin infection.  No pallor.   Psychiatric: Mood is flat, patient upset.    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 and whose End of Induction IB MRD was both negative by flow cytometry and PCR who presents for start of Maintenance, Cycle 1, Day 29 therapy     1) Ph+ Precursor B-Cell Acute Lymphoblastic Leukemia, in MRD Remission:  - 2-3 weeks of symptoms  - Presenting WBC > 440 k/uL, hyperleukocytosis  - Start of Hydroxyurea (1500 mg PO Q8) 2320 on 5/27/2022  - discontinued after only 55 hours  - No steroid pretreatment  - 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  - Testicular status NEGATIVE       - Flow cytometry of both 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, FISH positive for BCR-ABL1 translocation  - Enrolled on [facility] [id number]  - Initially enrolled on [facility] [id number] - but 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                            - Enrolled on [facility] [id number] and began study 6/13/2022              - Started imatinib therapy 6/3/2022   - End of Induction IA and IB MRD negative  - Imatinib dose increased to 400 mg PO AM and 250 mg PM 9/29/2022  -* Note:  All imatinib doses given inpatient rounded down to 600 mg  - Imatinib held for Septic Shock 12/27/2022-1/8/2023  - Imatinib 600 mg PO daily restarted 1/8/2023 inpatient  - Imatinib 400 mg PO QAM and 250 mg PO QHS 1/14/2023-1/17/2023  - Imatinib 600 mg PO QAM 1/17/2023 - 3/30/2023  - Imatinib 600 mg PO QAM 4/11/2023 - PRESENT (BSA obtained Day 1 of Cycle 1 and imatinib dose update.)                - WBC 0.4, Hgb 8.8, platelets 125             - , , absolute  monocyte count 10                - AST 68, , bilirubin 0.6 (total)                - ANC dropping over the past several weeks to 170 today.  Given ANC less than 500, per protocol will hold oral chemotherapy with the exclusion of imatinib.  Will hold 6-MP and methotrexate until ANC greater than 500.  If doses held for 2 weeks, will also hold imatinib.  When ANC greater than 500, will resume methotrexate and 6-MP at original dosing and only dose reduce if second drop below 500 occurs.  (See attached to the bottom of this note for study language)     [id number], AR Arm B, Maintenance, Cycle 1, Day 67:      ** Continue imatinib 600 mg PO daily     ** HOLD mercaptopurine 2.5 capsules (125 mg) PO QHS x 6 days and 3 capsules (150 mg) PO QHS x 1 days  ** HOLD methotrexate 14 tablets (35 mg = 20 mg/m² per week)      - Return to clinic 1 week for repeat counts and possible resumption of oral chemotherapy    2) Nausea and Vomiting:   - NS bolus 20 mL/kg in clinic with improvement    - Continue antiemetics at home    3) Transaminitis (MILD):              - AST 68, , total bilirubin 0.6  - Continue to monitor now taking 6-mercaptopurine and oral methotrexate     4) Chemotherapy Related Pancytopenia:  - WBC 0.4, Hgb 8.8, platelets 125             - , , absolute monocyte count 10  - Transfuse for hemoglobin less than 7.5 or symptomatic  - Transfuse for platelets less than 10,000 or symptomatic  - No transfusions necessary today    5) At Risk of Opportunistic Lung Infection:  - Bactrim DS PO BID Sat and Sun for PJP prophylaxis     6) Central Access:   - R Port-A-Cath in place      7) Research Participant: ON STUDY [id number], AR Arm B, Maintenance, Cycle 1, Day 67    Adverse Events:  Myelosuppression with  requiring HOLD of oral MTX and 6-MP    Study Language:    Imatinib During Maintenance:     If ANC falls below 500/L or if platelet count falls below 50,000/L: continue imatinib but hold 6MP and  "methotrexate, as indicated below. If after 2 weeks of holding methotrexate/6MP, ANC remains below 500/L or platelet count below 50,000/L, imatinib should be held. Imatinib should be restarted without dose reduction once counts recover (ANC > 500/?L, platelets > 50,000 ?L).     Mercaptopurine and Methotrexate During Maintenance:      Myelosuppression: Doses of MP and MTX may be adjusted as needed during maintenance to maintain consistent drug administration without significant myelosuppression. If neutrophil count falls below 500/L or if platelet count falls below 50,000/L during Maintenance, MP and MTX should be held until recovery above these levels. For the first drop in ANC or platelets, resume chemotherapy (both MP and MTX) at the same dose the patient was taking prior to the episode of myelosuppression. If neutrophil count falls below 500/L or if platelet count falls below 50 000/L for a second (or greater) time, discontinue doses of MP and MTX until ANC is ? 750/?L and platelets are ? 75 000/?L. Restart both MP and MTX at 50% of the dose prescribed at the time the medication was stopped. Then continue to increase to 75% and then 100% of the dose prescribed prior to stopping the medication at 2-4 week intervals provided ANC remains ? 750/?L and platelets remain ? 75 000/?L. May increase both MP and MTX simultaneously. Consider discontinuing TMP/SMX as per COG Supportive care Guidelines at: https://[facility].org/index.php/cog-supportive-care-guidelines. If neutrophil count falls below 500/?L or if platelet count falls below 50,000/?L on > 2 occasions during Maintenance, perform thiopurine pharmacology testing as described below, if not done previously. Should therapy be withheld for myelosuppression or elevated transaminases, do not \"make up\" that week. Resume therapy at the correct point, chronologically.             [facility] - Source Data         Diagnosis: Ph+ Precursor B-Cell " Acute Lymphoblastic Leukemia     Disease Status: EOI1A MRD NEGATIVE, EOI1B RD NEGATIVE, CNS3c, testicular negative, HSV1 IgG POSITIVE, CMV IgG NEGATIVE, VARICELLA IgG POSITIVE     Active Studies: [id number], [id number]                                                                                                      Inactive Studies: [id number]                                                                                                                                                Optional Studies: None             Protocol: International Phase 3 trial in [place] 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      Treatment Plan: [id number](OS), AR-Arm B, Maintenance, Cycle 1, Day 67 (7/27/2023)     Height: 1.667 m      Weight: 62.3kg       BSA: 1.70 m²   (Maintenance, Cycle 1, Day 1 5/22/2023)                                                                                                                                           Performance Status: Karnofsky 90, ECOG 1 (5/22/2023)     Treatment Plan Medications:  (100% adherent with all oral medications)     CONTINUE Imatinib 600 mg QAM - re-evaluated BSA on 5/22/2023 - no change in dosing  HOLD Mercaptopurine 125 mg PO QHS x 6 days and 150 mg PO QHS x 1  HOLD Methotrexate 35 mg PO weekly (on weeks without LP)    Evaluations / Study Labs:  (7/27/2023)    None      Therapy Given: (7/27/2023)     None     Maintenance, Cycle 1 Toxicities:  Decreased Neutrophil Count, Grade 4 -7/27/2023        Disposition: Return to clinic 1 week for reevaluation of labs.     Pepe Faye MD  Pediatric Hematology / Oncology  [facility]  Cell.  [phone]  Office. [phone]

## 2023-07-27 NOTE — PROGRESS NOTES
Kami from Lab called with critical result of ANC 0.17 at 1038. Critical lab result read back to Nicholas.   Dr. Faye notified of critical lab result at 1040.  Critical lab result read back by Dr. Faye.

## 2023-07-31 ENCOUNTER — HOSPITAL ENCOUNTER (OUTPATIENT)
Dept: RADIATION ONCOLOGY | Facility: MEDICAL CENTER | Age: 23
End: 2023-07-31
Attending: RADIOLOGY
Payer: MEDICAID

## 2023-07-31 DIAGNOSIS — C91.Z0 B LYMPHOBLASTIC LEUKEMIA WITH T(9;22)(Q34;Q11.2);BCR-ABL1 (HCC): ICD-10-CM

## 2023-07-31 NOTE — PROGRESS NOTES
This visit is being conducted by telephone. This telephone visit was initiated by the patient and they verbally consented. Patient at home in [facility].      Reason for Call: Posttreatment follow-up    Treatment History:     Radiation Oncology          6/15/2023 6/16/2023   Aria Course Treatment Dates   Course First Treatment Date 06/05/2023 06/05/2023    Course Last Treatment Date 06/15/2023   06/16/2023    Aria Treatment Summary   WBRT  Plan from Course C1_WBRT   Fraction 9 of 10 10 of 10    10 of 10   Elapsed Course Days 10 @ [id number] 11 @ [id number]    11 @ [id number]   Prescribed Fraction Dose 180 cGy 180 cGy    180 cGy   Prescribed Total Dose 1,800 cGy 1,800 cGy    1,800 cGy   WBRT  Reference Point from Course C1_WBRT   Elapsed Course Days 10 @ [id number] 11 @ [id number]    11 @ [id number]   Session Dose 180 cGy 180 cGy    --   Total Dose 1,620 cGy 1,800 cGy    1,800 cGy   WBRT CP  Reference Point from Course C1_WBRT   Elapsed Course Days 10 @ [id number] 11 @ [id number]    11 @ [id number]   Session Dose 180 cGy 180 cGy    --   Total Dose 1,620 cGy 1,800 cGy    1,800 cGy      Details          More values are hidden. Newest values shown. Go to activity for more data.                HPI:    Subjective    JULY is a 23 yo male with a PMH of ALL, B lymphoblastic with BCR ABL fusion, undergoing treatment on COG [id number], taken off due to Ph+ status ->  now on COG AALL 1631.       Interval History:   3/2/23 He had been doing relatively well on treatment, and has been followed very closely by medical oncology.  Unfortunately, more recently, he presented in early February for day 50 of VCR and delayed intensification.  He was admitted for febrile neutropenia and left shoulder pain.  He was diagnosed with a soft tissue/joint infection of Bacteroides, and ultimately was finally discharged.  Interim maintenance was delayed by a week due to active infection.  Ultimately, he was finally discharged  on March 1 for day 2 of interim maintenance 1.  With regards to his left shoulder infection, he is status post open exploration and drainage performed on February 17, and continues to recover.  He still has significant swelling and still has significant pain even with passive motion, but feels like he is improving.    4/17/23 XRT simulation for WBRT    7/31/23 we are having our first posttreatment follow-up via telephone.  He completed whole brain radiation as planned as above.  He tolerated treatment relatively well.  He did have treatment related alopecia at the end as expected.  Other than that, no major complaints with headaches, progressive CNS symptoms, or skin related problems.    Labs / Images Reviewed:   No results found.    Assessment:   ON STUDY [id number], AR Arm B, Maintenance, Cycle 1    Cancer Status:   Therapy Completed, Status Pending Restaging Work-up    Plan:   He is doing quite well.  At this point, he is tolerating therapy relatively well.  He will continue follow-up with pediatric medical oncology and continue on protocol with subsequent treatment to follow as per maintenance schedule.  At this point, he has no further need for radiation, and to limit his visits, I have advised him to please let me know via telephone if he has any questions or concerns, though certainly he can come see us in the office at any point in the future as needed.    Time Spent on Call: 5 minutes      Time Spent in Preparation: 5 minutes    Total Time Spent: 10 Minutes    Irving Richards M.D.

## 2023-08-02 ENCOUNTER — HOSPITAL ENCOUNTER (OUTPATIENT)
Dept: INFUSION CENTER | Facility: MEDICAL CENTER | Age: 23
End: 2023-08-02
Attending: PEDIATRICS
Payer: COMMERCIAL

## 2023-08-02 VITALS
WEIGHT: 143.96 LBS | SYSTOLIC BLOOD PRESSURE: 134 MMHG | RESPIRATION RATE: 20 BRPM | HEART RATE: 108 BPM | TEMPERATURE: 98.8 F | OXYGEN SATURATION: 95 % | BODY MASS INDEX: 23.99 KG/M2 | HEIGHT: 65 IN | DIASTOLIC BLOOD PRESSURE: 62 MMHG

## 2023-08-02 DIAGNOSIS — C91.01 ACUTE LYMPHOBLASTIC LEUKEMIA (ALL) IN REMISSION (HCC): ICD-10-CM

## 2023-08-02 DIAGNOSIS — C91.Z0 B LYMPHOBLASTIC LEUKEMIA WITH T(9;22)(Q34;Q11.2);BCR-ABL1 (HCC): ICD-10-CM

## 2023-08-02 LAB
ANISOCYTOSIS BLD QL SMEAR: ABNORMAL
BASOPHILS # BLD AUTO: 3.3 % (ref 0–1.8)
BASOPHILS # BLD: 0.03 K/UL (ref 0–0.12)
EOSINOPHIL # BLD AUTO: 0 K/UL (ref 0–0.51)
EOSINOPHIL NFR BLD: 0 % (ref 0–6.9)
ERYTHROCYTE [DISTWIDTH] IN BLOOD BY AUTOMATED COUNT: 66.6 FL (ref 35.9–50)
HCT VFR BLD AUTO: 24.7 % (ref 42–52)
HGB BLD-MCNC: 8.2 G/DL (ref 14–18)
LYMPHOCYTES # BLD AUTO: 0.27 K/UL (ref 1–4.8)
LYMPHOCYTES NFR BLD: 33.9 % (ref 22–41)
MACROCYTES BLD QL SMEAR: ABNORMAL
MANUAL DIFF BLD: NORMAL
MCH RBC QN AUTO: 37.3 PG (ref 27–33)
MCHC RBC AUTO-ENTMCNC: 33.2 G/DL (ref 32.3–36.5)
MCV RBC AUTO: 112.3 FL (ref 81.4–97.8)
MONOCYTES # BLD AUTO: 0.2 K/UL (ref 0–0.85)
MONOCYTES NFR BLD AUTO: 25.6 % (ref 0–13.4)
MORPHOLOGY BLD-IMP: NORMAL
NEUTROPHILS # BLD AUTO: 0.3 K/UL (ref 1.82–7.42)
NEUTROPHILS NFR BLD: 36.4 % (ref 44–72)
NEUTS BAND NFR BLD MANUAL: 0.8 % (ref 0–10)
NRBC # BLD AUTO: 0 K/UL
NRBC BLD-RTO: 0 /100 WBC (ref 0–0.2)
PLATELET # BLD AUTO: 123 K/UL (ref 164–446)
PLATELET BLD QL SMEAR: NORMAL
PMV BLD AUTO: 11.5 FL (ref 9–12.9)
POIKILOCYTOSIS BLD QL SMEAR: NORMAL
RBC # BLD AUTO: 2.2 M/UL (ref 4.7–6.1)
RBC BLD AUTO: PRESENT
SCHISTOCYTES BLD QL SMEAR: NORMAL
WBC # BLD AUTO: 0.8 K/UL (ref 4.8–10.8)

## 2023-08-02 PROCEDURE — 36591 DRAW BLOOD OFF VENOUS DEVICE: CPT

## 2023-08-02 PROCEDURE — 85007 BL SMEAR W/DIFF WBC COUNT: CPT

## 2023-08-02 PROCEDURE — 700111 HCHG RX REV CODE 636 W/ 250 OVERRIDE (IP): Mod: UD | Performed by: PEDIATRICS

## 2023-08-02 PROCEDURE — 85025 COMPLETE CBC W/AUTO DIFF WBC: CPT

## 2023-08-02 RX ORDER — 0.9 % SODIUM CHLORIDE 0.9 %
20 VIAL (ML) INJECTION PRN
Status: CANCELLED | OUTPATIENT
Start: 2023-08-02

## 2023-08-02 RX ORDER — HEPARIN SODIUM (PORCINE) LOCK FLUSH IV SOLN 100 UNIT/ML 100 UNIT/ML
500 SOLUTION INTRAVENOUS PRN
Status: CANCELLED | OUTPATIENT
Start: 2023-08-02

## 2023-08-02 RX ORDER — HEPARIN SODIUM,PORCINE 10 UNIT/ML
30 VIAL (ML) INTRAVENOUS PRN
Status: CANCELLED | OUTPATIENT
Start: 2023-08-02

## 2023-08-02 RX ADMIN — HEPARIN 500 UNITS: 100 SYRINGE at 13:34

## 2023-08-02 ASSESSMENT — FIBROSIS 4 INDEX: FIB4 SCORE: 0.92

## 2023-08-02 NOTE — PROGRESS NOTES
Pt to [facility] for lab draw and Dr. gong.  Afebrile.  VSS.  Awake and alert in no acute distress.  Port accessed with 22 g 3/4in   and labs drawn from the port without difficulty / with 1 attempt.   Pt tolerated well. No further orders per Dr Faye.  Port flushed per orders (see MAR) and port de-accessed.

## 2023-08-03 NOTE — PROGRESS NOTES
Tomas Jean-Baptiste returns to clinic 7 days after he was seen acutely on Day 67 of Cycle 1 of Maintenance with complaints of abdominal discomfort, nausea, vomiting and fatigue.  At the time of his presentation on 07/27/2023 he was noted to have a WBC count 0.4, Hgb 8.8 and a platelet count of 125 with an ANC of just 170.  As such, oral mercaptopurine and oral methotrexate were held due to myelosuppression.  Imatinib was continued at 600 mg PO daily.  Today, Tomas Roy presents back to clinic for reevaluation of his blood counts.    CBC today demonstrates WBC 0.8, Hgb 8.2, platelets 123.  ANC is increased to 300 and his absolute monocyte count also increasing to 200.    Given however an ANC of only 300, oral mercaptopurine and oral methotrexate will continue to be held.  Imatinib will continue to be administered as well as week and Bactrim.     Tomas Roy is scheduled to return back to clinic on 8/7/2023 at 12 days following his oral chemotherapy being held.    Study Language:     Imatinib During Maintenance:      If ANC falls below 500/L or if platelet count falls below 50,000/L: continue imatinib but hold 6MP and methotrexate, as indicated below. If after 2 weeks of holding methotrexate/6MP, ANC remains below 500/L or platelet count below 50,000/L, imatinib should be held. Imatinib should be restarted without dose reduction once counts recover (ANC > 500/?L, platelets > 50,000 ?L).      Mercaptopurine and Methotrexate During Maintenance:       Myelosuppression: Doses of MP and MTX may be adjusted as needed during maintenance to maintain consistent drug administration without significant myelosuppression. If neutrophil count falls below 500/L or if platelet count falls below 50,000/L during Maintenance, MP and MTX should be held until recovery above these levels. For the first drop in ANC or platelets, resume chemotherapy (both MP and MTX) at the same dose the patient was taking prior to the  "episode of myelosuppression. If neutrophil count falls below 500/L or if platelet count falls below 50 000/L for a second (or greater) time, discontinue doses of MP and MTX until ANC is ? 750/?L and platelets are ? 75 000/?L. Restart both MP and MTX at 50% of the dose prescribed at the time the medication was stopped. Then continue to increase to 75% and then 100% of the dose prescribed prior to stopping the medication at 2-4 week intervals provided ANC remains ? 750/?L and platelets remain ? 75 000/?L. May increase both MP and MTX simultaneously. Consider discontinuing TMP/SMX as per COG Supportive care Guidelines at: https://childrensoncologygroup.org/index.php/cog-supportive-care-guidelines. If neutrophil count falls below 500/?L or if platelet count falls below 50,000/?L on > 2 occasions during Maintenance, perform thiopurine pharmacology testing as described below, if not done previously. Should therapy be withheld for myelosuppression or elevated transaminases, do not \"make up\" that week. Resume therapy at the correct point, chronologically.       "

## 2023-08-07 ENCOUNTER — HOSPITAL ENCOUNTER (OUTPATIENT)
Dept: INFUSION CENTER | Facility: MEDICAL CENTER | Age: 23
End: 2023-08-07
Attending: PEDIATRICS
Payer: COMMERCIAL

## 2023-08-07 DIAGNOSIS — C91.Z0 B LYMPHOBLASTIC LEUKEMIA WITH T(9;22)(Q34;Q11.2);BCR-ABL1 (HCC): ICD-10-CM

## 2023-08-07 DIAGNOSIS — T45.1X5A CINV (CHEMOTHERAPY-INDUCED NAUSEA AND VOMITING): ICD-10-CM

## 2023-08-07 DIAGNOSIS — R11.2 CINV (CHEMOTHERAPY-INDUCED NAUSEA AND VOMITING): ICD-10-CM

## 2023-08-07 LAB
ANISOCYTOSIS BLD QL SMEAR: ABNORMAL
BASOPHILS # BLD AUTO: 1.7 % (ref 0–1.8)
BASOPHILS # BLD: 0.02 K/UL (ref 0–0.12)
EOSINOPHIL # BLD AUTO: 0.04 K/UL (ref 0–0.51)
EOSINOPHIL NFR BLD: 3.4 % (ref 0–6.9)
ERYTHROCYTE [DISTWIDTH] IN BLOOD BY AUTOMATED COUNT: 75.4 FL (ref 35.9–50)
HCT VFR BLD AUTO: 25.1 % (ref 42–52)
HGB BLD-MCNC: 8.4 G/DL (ref 14–18)
LYMPHOCYTES # BLD AUTO: 0.35 K/UL (ref 1–4.8)
LYMPHOCYTES NFR BLD: 32.2 % (ref 22–41)
MACROCYTES BLD QL SMEAR: ABNORMAL
MANUAL DIFF BLD: NORMAL
MCH RBC QN AUTO: 37.3 PG (ref 27–33)
MCHC RBC AUTO-ENTMCNC: 33.5 G/DL (ref 32.3–36.5)
MCV RBC AUTO: 111.6 FL (ref 81.4–97.8)
MONOCYTES # BLD AUTO: 0.26 K/UL (ref 0–0.85)
MONOCYTES NFR BLD AUTO: 23.7 % (ref 0–13.4)
MORPHOLOGY BLD-IMP: NORMAL
NEUTROPHILS # BLD AUTO: 0.43 K/UL (ref 1.82–7.42)
NEUTROPHILS NFR BLD: 39 % (ref 44–72)
NRBC # BLD AUTO: 0 K/UL
NRBC BLD-RTO: 0 /100 WBC (ref 0–0.2)
PLATELET # BLD AUTO: 169 K/UL (ref 164–446)
PLATELET BLD QL SMEAR: NORMAL
PMV BLD AUTO: 10.8 FL (ref 9–12.9)
RBC # BLD AUTO: 2.25 M/UL (ref 4.7–6.1)
RBC BLD AUTO: PRESENT
WBC # BLD AUTO: 1.1 K/UL (ref 4.8–10.8)

## 2023-08-07 PROCEDURE — 36591 DRAW BLOOD OFF VENOUS DEVICE: CPT

## 2023-08-07 PROCEDURE — A4212 NON CORING NEEDLE OR STYLET: HCPCS

## 2023-08-07 PROCEDURE — 85025 COMPLETE CBC W/AUTO DIFF WBC: CPT

## 2023-08-07 PROCEDURE — 700111 HCHG RX REV CODE 636 W/ 250 OVERRIDE (IP): Mod: JZ,UD | Performed by: PEDIATRICS

## 2023-08-07 PROCEDURE — 85007 BL SMEAR W/DIFF WBC COUNT: CPT | Mod: XU

## 2023-08-07 PROCEDURE — 96374 THER/PROPH/DIAG INJ IV PUSH: CPT

## 2023-08-07 RX ORDER — HEPARIN SODIUM,PORCINE 10 UNIT/ML
30 VIAL (ML) INTRAVENOUS PRN
Status: CANCELLED
Start: 2023-08-07

## 2023-08-07 RX ORDER — HEPARIN SODIUM (PORCINE) LOCK FLUSH IV SOLN 100 UNIT/ML 100 UNIT/ML
500 SOLUTION INTRAVENOUS PRN
Status: DISCONTINUED | OUTPATIENT
Start: 2023-08-07 | End: 2023-08-08 | Stop reason: HOSPADM

## 2023-08-07 RX ORDER — 0.9 % SODIUM CHLORIDE 0.9 %
20 VIAL (ML) INJECTION PRN
Status: CANCELLED | OUTPATIENT
Start: 2023-08-07

## 2023-08-07 RX ORDER — HEPARIN SODIUM (PORCINE) LOCK FLUSH IV SOLN 100 UNIT/ML 100 UNIT/ML
500 SOLUTION INTRAVENOUS PRN
Status: CANCELLED | OUTPATIENT
Start: 2023-08-07

## 2023-08-07 RX ORDER — HEPARIN SODIUM,PORCINE 10 UNIT/ML
30 VIAL (ML) INTRAVENOUS PRN
Status: CANCELLED | OUTPATIENT
Start: 2023-08-07

## 2023-08-07 RX ORDER — ONDANSETRON 2 MG/ML
8 INJECTION INTRAMUSCULAR; INTRAVENOUS ONCE
Status: COMPLETED | OUTPATIENT
Start: 2023-08-07 | End: 2023-08-07

## 2023-08-07 RX ADMIN — ONDANSETRON 8 MG: 2 INJECTION INTRAMUSCULAR; INTRAVENOUS at 14:04

## 2023-08-07 RX ADMIN — HEPARIN 500 UNITS: 100 SYRINGE at 14:12

## 2023-08-07 NOTE — PROGRESS NOTES
Pt to [facility] for lab draw and DrReid visit.  Afebrile.  VSS.  Awake and alert in no acute distress.  Port accessed with 22 g 3/4in   and labs drawn from the port without difficulty / with 1 attempt.   Pt tolerated well.  Visit with Dr. Faye completed. Order for Zofran placed.Med given per MAR.  Port flushed per orders (see MAR) and port de-accessed.  Plan to follow up with Dr Faye  for results.

## 2023-08-11 ENCOUNTER — HOSPITAL ENCOUNTER (OUTPATIENT)
Dept: INFUSION CENTER | Facility: MEDICAL CENTER | Age: 23
End: 2023-08-11
Attending: PEDIATRICS
Payer: COMMERCIAL

## 2023-08-11 VITALS
SYSTOLIC BLOOD PRESSURE: 108 MMHG | RESPIRATION RATE: 18 BRPM | DIASTOLIC BLOOD PRESSURE: 65 MMHG | TEMPERATURE: 99.2 F | OXYGEN SATURATION: 97 % | HEART RATE: 105 BPM

## 2023-08-11 DIAGNOSIS — C91.Z0 B LYMPHOBLASTIC LEUKEMIA WITH T(9;22)(Q34;Q11.2);BCR-ABL1 (HCC): ICD-10-CM

## 2023-08-11 LAB
ANISOCYTOSIS BLD QL SMEAR: ABNORMAL
BASOPHILS # BLD AUTO: 0 % (ref 0–1.8)
BASOPHILS # BLD: 0 K/UL (ref 0–0.12)
EOSINOPHIL # BLD AUTO: 0 K/UL (ref 0–0.51)
EOSINOPHIL NFR BLD: 0 % (ref 0–6.9)
ERYTHROCYTE [DISTWIDTH] IN BLOOD BY AUTOMATED COUNT: 70.2 FL (ref 35.9–50)
HCT VFR BLD AUTO: 26.7 % (ref 42–52)
HGB BLD-MCNC: 8.9 G/DL (ref 14–18)
LYMPHOCYTES # BLD AUTO: 0.31 K/UL (ref 1–4.8)
LYMPHOCYTES NFR BLD: 14.7 % (ref 22–41)
MACROCYTES BLD QL SMEAR: ABNORMAL
MANUAL DIFF BLD: NORMAL
MCH RBC QN AUTO: 37.1 PG (ref 27–33)
MCHC RBC AUTO-ENTMCNC: 33.3 G/DL (ref 32.3–36.5)
MCV RBC AUTO: 111.3 FL (ref 81.4–97.8)
MONOCYTES # BLD AUTO: 0.29 K/UL (ref 0–0.85)
MONOCYTES NFR BLD AUTO: 13.8 % (ref 0–13.4)
MORPHOLOGY BLD-IMP: NORMAL
NEUTROPHILS # BLD AUTO: 1.5 K/UL (ref 1.82–7.42)
NEUTROPHILS NFR BLD: 71.5 % (ref 44–72)
NRBC # BLD AUTO: 0 K/UL
NRBC BLD-RTO: 0 /100 WBC (ref 0–0.2)
PLATELET # BLD AUTO: 216 K/UL (ref 164–446)
PLATELET BLD QL SMEAR: NORMAL
PMV BLD AUTO: 9.7 FL (ref 9–12.9)
RBC # BLD AUTO: 2.4 M/UL (ref 4.7–6.1)
RBC BLD AUTO: PRESENT
TOXIC GRANULES BLD QL SMEAR: NORMAL
WBC # BLD AUTO: 2.1 K/UL (ref 4.8–10.8)

## 2023-08-11 PROCEDURE — 85007 BL SMEAR W/DIFF WBC COUNT: CPT | Mod: XU

## 2023-08-11 PROCEDURE — 700111 HCHG RX REV CODE 636 W/ 250 OVERRIDE (IP): Mod: UD | Performed by: PEDIATRICS

## 2023-08-11 PROCEDURE — 36591 DRAW BLOOD OFF VENOUS DEVICE: CPT

## 2023-08-11 PROCEDURE — 99213 OFFICE O/P EST LOW 20 MIN: CPT | Performed by: PEDIATRICS

## 2023-08-11 PROCEDURE — 85025 COMPLETE CBC W/AUTO DIFF WBC: CPT

## 2023-08-11 PROCEDURE — A4212 NON CORING NEEDLE OR STYLET: HCPCS

## 2023-08-11 RX ORDER — HEPARIN SODIUM,PORCINE 10 UNIT/ML
30 VIAL (ML) INTRAVENOUS PRN
Status: CANCELLED
Start: 2023-08-11

## 2023-08-11 RX ORDER — 0.9 % SODIUM CHLORIDE 0.9 %
20 VIAL (ML) INJECTION PRN
Status: CANCELLED | OUTPATIENT
Start: 2023-08-11

## 2023-08-11 RX ADMIN — HEPARIN 500 UNITS: 100 SYRINGE at 12:21

## 2023-08-11 NOTE — PROGRESS NOTES
Pt to [facility] for lab draw and DrRied visit.  Afebrile.  VSS.  Awake and alert in no acute distress.  Port accessed with 22 g 3/4in and labs drawn from the port without difficulty / with 1 attempt by Camryn landrum RN.   Pt tolerated well.  Visit with Dr. Faye completed. No further orders.  Port flushed per orders (see MAR) and port de-accessed.  Plan to follow up Dr. Faye for lab results and plan of care.

## 2023-08-14 ENCOUNTER — HOSPITAL ENCOUNTER (OUTPATIENT)
Dept: INFUSION CENTER | Facility: MEDICAL CENTER | Age: 23
End: 2023-08-14
Attending: PEDIATRICS
Payer: COMMERCIAL

## 2023-08-14 VITALS
SYSTOLIC BLOOD PRESSURE: 99 MMHG | HEART RATE: 88 BPM | WEIGHT: 136.69 LBS | TEMPERATURE: 98.5 F | RESPIRATION RATE: 20 BRPM | OXYGEN SATURATION: 99 % | HEIGHT: 65 IN | BODY MASS INDEX: 22.77 KG/M2 | DIASTOLIC BLOOD PRESSURE: 54 MMHG

## 2023-08-14 DIAGNOSIS — Z51.11 ENCOUNTER FOR CHEMOTHERAPY MANAGEMENT: ICD-10-CM

## 2023-08-14 DIAGNOSIS — K21.9 GASTROESOPHAGEAL REFLUX DISEASE WITHOUT ESOPHAGITIS: ICD-10-CM

## 2023-08-14 DIAGNOSIS — C91.01 ACUTE LYMPHOBLASTIC LEUKEMIA (ALL) IN REMISSION (HCC): ICD-10-CM

## 2023-08-14 DIAGNOSIS — C91.Z0 B LYMPHOBLASTIC LEUKEMIA WITH T(9;22)(Q34;Q11.2);BCR-ABL1 (HCC): ICD-10-CM

## 2023-08-14 LAB
ALBUMIN SERPL BCP-MCNC: 4 G/DL (ref 3.2–4.9)
ALBUMIN/GLOB SERPL: 1.6 G/DL
ALP SERPL-CCNC: 106 U/L (ref 30–99)
ALT SERPL-CCNC: 53 U/L (ref 2–50)
ANION GAP SERPL CALC-SCNC: 13 MMOL/L (ref 7–16)
AST SERPL-CCNC: 31 U/L (ref 12–45)
BASOPHILS # BLD AUTO: 0.4 % (ref 0–1.8)
BASOPHILS # BLD: 0.01 K/UL (ref 0–0.12)
BILIRUB CONJ SERPL-MCNC: <0.2 MG/DL (ref 0.1–0.5)
BILIRUB INDIRECT SERPL-MCNC: NORMAL MG/DL (ref 0–1)
BILIRUB SERPL-MCNC: 0.8 MG/DL (ref 0.1–1.5)
BUN SERPL-MCNC: 5 MG/DL (ref 8–22)
BURR CELLS/RBC NFR CSF MANUAL: 0 %
CALCIUM ALBUM COR SERPL-MCNC: 8.3 MG/DL (ref 8.5–10.5)
CALCIUM SERPL-MCNC: 8.3 MG/DL (ref 8.5–10.5)
CHLORIDE SERPL-SCNC: 103 MMOL/L (ref 96–112)
CLARITY CSF: CLEAR
CO2 SERPL-SCNC: 20 MMOL/L (ref 20–33)
COLOR CSF: COLORLESS
COLOR SPUN CSF: COLORLESS
CREAT SERPL-MCNC: 0.57 MG/DL (ref 0.5–1.4)
CYTOLOGY REG CYTOL: NORMAL
EOSINOPHIL # BLD AUTO: 0.01 K/UL (ref 0–0.51)
EOSINOPHIL NFR BLD: 0.4 % (ref 0–6.9)
ERYTHROCYTE [DISTWIDTH] IN BLOOD BY AUTOMATED COUNT: 65.4 FL (ref 35.9–50)
GFR SERPLBLD CREATININE-BSD FMLA CKD-EPI: 142 ML/MIN/1.73 M 2
GLOBULIN SER CALC-MCNC: 2.5 G/DL (ref 1.9–3.5)
GLUCOSE SERPL-MCNC: 117 MG/DL (ref 65–99)
HCT VFR BLD AUTO: 27.6 % (ref 42–52)
HGB BLD-MCNC: 9.3 G/DL (ref 14–18)
IMM GRANULOCYTES # BLD AUTO: 0.02 K/UL (ref 0–0.11)
IMM GRANULOCYTES NFR BLD AUTO: 0.7 % (ref 0–0.9)
LYMPHOCYTES # BLD AUTO: 0.32 K/UL (ref 1–4.8)
LYMPHOCYTES NFR BLD: 11.3 % (ref 22–41)
LYMPHOCYTES NFR CSF: 99 %
MCH RBC QN AUTO: 37.2 PG (ref 27–33)
MCHC RBC AUTO-ENTMCNC: 33.7 G/DL (ref 32.3–36.5)
MCV RBC AUTO: 110.4 FL (ref 81.4–97.8)
MONOCYTES # BLD AUTO: 0.47 K/UL (ref 0–0.85)
MONOCYTES NFR BLD AUTO: 16.5 % (ref 0–13.4)
NEUTROPHILS # BLD AUTO: 2.01 K/UL (ref 1.82–7.42)
NEUTROPHILS NFR BLD: 70.7 % (ref 44–72)
NEUTROPHILS NFR CSF: 1 %
NRBC # BLD AUTO: 0 K/UL
NRBC BLD-RTO: 0 /100 WBC (ref 0–0.2)
NUC CELL # CSF: 2 CELLS/UL (ref 0–10)
PLATELET # BLD AUTO: 271 K/UL (ref 164–446)
PMV BLD AUTO: 9.4 FL (ref 9–12.9)
POTASSIUM SERPL-SCNC: 3.6 MMOL/L (ref 3.6–5.5)
PROT SERPL-MCNC: 6.5 G/DL (ref 6–8.2)
RBC # BLD AUTO: 2.5 M/UL (ref 4.7–6.1)
RBC # CSF: <1 CELLS/UL
SODIUM SERPL-SCNC: 136 MMOL/L (ref 135–145)
SPECIMEN VOL CSF: 1.5 ML
TUBE # CSF: 2
TUBE # CSF: NORMAL
WBC # BLD AUTO: 2.8 K/UL (ref 4.8–10.8)

## 2023-08-14 PROCEDURE — 700105 HCHG RX REV CODE 258: Mod: UD | Performed by: PEDIATRICS

## 2023-08-14 PROCEDURE — 700111 HCHG RX REV CODE 636 W/ 250 OVERRIDE (IP): Mod: JZ,UD | Performed by: PEDIATRICS

## 2023-08-14 PROCEDURE — 80053 COMPREHEN METABOLIC PANEL: CPT

## 2023-08-14 PROCEDURE — 96450 CHEMOTHERAPY INTO CNS: CPT

## 2023-08-14 PROCEDURE — 85025 COMPLETE CBC W/AUTO DIFF WBC: CPT

## 2023-08-14 PROCEDURE — RXMED WILLOW AMBULATORY MEDICATION CHARGE: Performed by: PEDIATRICS

## 2023-08-14 PROCEDURE — 88108 CYTOPATH CONCENTRATE TECH: CPT

## 2023-08-14 PROCEDURE — 36591 DRAW BLOOD OFF VENOUS DEVICE: CPT

## 2023-08-14 PROCEDURE — 96450 CHEMOTHERAPY INTO CNS: CPT | Performed by: PEDIATRICS

## 2023-08-14 PROCEDURE — A4212 NON CORING NEEDLE OR STYLET: HCPCS

## 2023-08-14 PROCEDURE — 96409 CHEMO IV PUSH SNGL DRUG: CPT

## 2023-08-14 PROCEDURE — 82248 BILIRUBIN DIRECT: CPT

## 2023-08-14 PROCEDURE — 89051 BODY FLUID CELL COUNT: CPT

## 2023-08-14 PROCEDURE — 99214 OFFICE O/P EST MOD 30 MIN: CPT | Mod: 25 | Performed by: PEDIATRICS

## 2023-08-14 PROCEDURE — 96375 TX/PRO/DX INJ NEW DRUG ADDON: CPT | Mod: XU

## 2023-08-14 RX ORDER — PROMETHAZINE HYDROCHLORIDE 6.25 MG/5ML
0.25 SYRUP ORAL EVERY 6 HOURS PRN
Status: CANCELLED | OUTPATIENT
Start: 2023-10-10

## 2023-08-14 RX ORDER — HEPARIN SODIUM,PORCINE 10 UNIT/ML
30 VIAL (ML) INTRAVENOUS PRN
Status: CANCELLED
Start: 2023-08-14

## 2023-08-14 RX ORDER — LIDOCAINE AND PRILOCAINE 25; 25 MG/G; MG/G
CREAM TOPICAL PRN
Status: CANCELLED | OUTPATIENT
Start: 2023-09-11

## 2023-08-14 RX ORDER — SODIUM CHLORIDE 9 MG/ML
500 INJECTION, SOLUTION INTRAVENOUS CONTINUOUS
Status: CANCELLED | OUTPATIENT
Start: 2023-10-09

## 2023-08-14 RX ORDER — PROMETHAZINE HYDROCHLORIDE 6.25 MG/5ML
0.25 SYRUP ORAL EVERY 6 HOURS PRN
Status: CANCELLED | OUTPATIENT
Start: 2023-09-12

## 2023-08-14 RX ORDER — MIDAZOLAM HYDROCHLORIDE 1 MG/ML
2 INJECTION INTRAMUSCULAR; INTRAVENOUS ONCE
Status: COMPLETED | OUTPATIENT
Start: 2023-08-14 | End: 2023-08-14

## 2023-08-14 RX ORDER — ONDANSETRON 2 MG/ML
8 INJECTION INTRAMUSCULAR; INTRAVENOUS ONCE
Status: CANCELLED | OUTPATIENT
Start: 2023-10-10

## 2023-08-14 RX ORDER — ONDANSETRON 2 MG/ML
8 INJECTION INTRAMUSCULAR; INTRAVENOUS EVERY 8 HOURS PRN
Status: CANCELLED | OUTPATIENT
Start: 2023-09-12

## 2023-08-14 RX ORDER — ONDANSETRON 2 MG/ML
8 INJECTION INTRAMUSCULAR; INTRAVENOUS EVERY 8 HOURS PRN
Status: CANCELLED | OUTPATIENT
Start: 2023-09-11

## 2023-08-14 RX ORDER — MIDAZOLAM HYDROCHLORIDE 1 MG/ML
2 INJECTION INTRAMUSCULAR; INTRAVENOUS ONCE
Status: CANCELLED
Start: 2023-09-11 | End: 2023-09-12

## 2023-08-14 RX ORDER — PROMETHAZINE HYDROCHLORIDE 6.25 MG/5ML
0.25 SYRUP ORAL EVERY 6 HOURS PRN
Status: CANCELLED | OUTPATIENT
Start: 2023-08-14

## 2023-08-14 RX ORDER — ONDANSETRON 2 MG/ML
8 INJECTION INTRAMUSCULAR; INTRAVENOUS ONCE
Status: CANCELLED | OUTPATIENT
Start: 2023-09-12

## 2023-08-14 RX ORDER — SODIUM CHLORIDE 9 MG/ML
500 INJECTION, SOLUTION INTRAVENOUS CONTINUOUS
Status: CANCELLED | OUTPATIENT
Start: 2023-09-11

## 2023-08-14 RX ORDER — ONDANSETRON 2 MG/ML
8 INJECTION INTRAMUSCULAR; INTRAVENOUS ONCE
Status: CANCELLED | OUTPATIENT
Start: 2023-09-11

## 2023-08-14 RX ORDER — 0.9 % SODIUM CHLORIDE 0.9 %
20 VIAL (ML) INJECTION PRN
Status: CANCELLED | OUTPATIENT
Start: 2023-08-14

## 2023-08-14 RX ORDER — PROMETHAZINE HYDROCHLORIDE 6.25 MG/5ML
0.25 SYRUP ORAL EVERY 6 HOURS PRN
Status: CANCELLED | OUTPATIENT
Start: 2023-09-11

## 2023-08-14 RX ORDER — ONDANSETRON 2 MG/ML
8 INJECTION INTRAMUSCULAR; INTRAVENOUS EVERY 8 HOURS PRN
Status: CANCELLED | OUTPATIENT
Start: 2023-08-14

## 2023-08-14 RX ORDER — MIDAZOLAM HYDROCHLORIDE 1 MG/ML
1 INJECTION INTRAMUSCULAR; INTRAVENOUS
Status: DISCONTINUED | OUTPATIENT
Start: 2023-08-14 | End: 2023-08-15 | Stop reason: HOSPADM

## 2023-08-14 RX ORDER — LORAZEPAM 2 MG/ML
1 CONCENTRATE ORAL EVERY 6 HOURS PRN
Status: CANCELLED | OUTPATIENT
Start: 2023-09-11

## 2023-08-14 RX ORDER — ONDANSETRON 2 MG/ML
8 INJECTION INTRAMUSCULAR; INTRAVENOUS EVERY 8 HOURS PRN
Status: CANCELLED | OUTPATIENT
Start: 2023-08-15

## 2023-08-14 RX ORDER — PROMETHAZINE HYDROCHLORIDE 6.25 MG/5ML
0.25 SYRUP ORAL EVERY 6 HOURS PRN
Status: CANCELLED | OUTPATIENT
Start: 2023-08-15

## 2023-08-14 RX ORDER — ONDANSETRON 2 MG/ML
8 INJECTION INTRAMUSCULAR; INTRAVENOUS ONCE
Status: COMPLETED | OUTPATIENT
Start: 2023-08-14 | End: 2023-08-14

## 2023-08-14 RX ORDER — ONDANSETRON 2 MG/ML
8 INJECTION INTRAMUSCULAR; INTRAVENOUS EVERY 8 HOURS PRN
Status: CANCELLED | OUTPATIENT
Start: 2023-10-10

## 2023-08-14 RX ORDER — LIDOCAINE AND PRILOCAINE 25; 25 MG/G; MG/G
CREAM TOPICAL PRN
Status: CANCELLED | OUTPATIENT
Start: 2023-10-09

## 2023-08-14 RX ORDER — LIDOCAINE AND PRILOCAINE 25; 25 MG/G; MG/G
CREAM TOPICAL PRN
Status: CANCELLED | OUTPATIENT
Start: 2023-08-15

## 2023-08-14 RX ORDER — OMEPRAZOLE 20 MG/1
20 CAPSULE, DELAYED RELEASE ORAL DAILY
Qty: 30 CAPSULE | Refills: 5 | Status: SHIPPED | OUTPATIENT
Start: 2023-08-14 | End: 2023-10-09 | Stop reason: SDUPTHER

## 2023-08-14 RX ORDER — ONDANSETRON 2 MG/ML
8 INJECTION INTRAMUSCULAR; INTRAVENOUS ONCE
Status: CANCELLED | OUTPATIENT
Start: 2023-08-15

## 2023-08-14 RX ORDER — SODIUM CHLORIDE 9 MG/ML
500 INJECTION, SOLUTION INTRAVENOUS CONTINUOUS
Status: CANCELLED | OUTPATIENT
Start: 2023-08-15

## 2023-08-14 RX ORDER — SODIUM CHLORIDE 9 MG/ML
1000 INJECTION, SOLUTION INTRAVENOUS ONCE
Status: COMPLETED | OUTPATIENT
Start: 2023-08-14 | End: 2023-08-14

## 2023-08-14 RX ORDER — LORAZEPAM 2 MG/ML
1 CONCENTRATE ORAL EVERY 6 HOURS PRN
Status: CANCELLED | OUTPATIENT
Start: 2023-10-09

## 2023-08-14 RX ORDER — LORAZEPAM 2 MG/ML
1 CONCENTRATE ORAL EVERY 6 HOURS PRN
Status: CANCELLED | OUTPATIENT
Start: 2023-08-15

## 2023-08-14 RX ADMIN — MIDAZOLAM HYDROCHLORIDE 2 MG: 2 INJECTION, SOLUTION INTRAMUSCULAR; INTRAVENOUS at 13:41

## 2023-08-14 RX ADMIN — ONDANSETRON 8 MG: 2 INJECTION INTRAMUSCULAR; INTRAVENOUS at 13:21

## 2023-08-14 RX ADMIN — METHOTREXATE 12 MG: 25 SOLUTION INTRA-ARTERIAL; INTRAMUSCULAR; INTRATHECAL; INTRAVENOUS at 13:59

## 2023-08-14 RX ADMIN — SODIUM CHLORIDE 1000 ML: 9 INJECTION, SOLUTION INTRAVENOUS at 12:41

## 2023-08-14 RX ADMIN — HEPARIN 500 UNITS: 100 SYRINGE at 15:09

## 2023-08-14 RX ADMIN — MIDAZOLAM HYDROCHLORIDE 1 MG: 2 INJECTION, SOLUTION INTRAMUSCULAR; INTRAVENOUS at 13:55

## 2023-08-14 RX ADMIN — VINCRISTINE SULFATE 2 MG: 1 INJECTION, SOLUTION INTRAVENOUS at 14:18

## 2023-08-14 ASSESSMENT — FIBROSIS 4 INDEX: FIB4 SCORE: 0.53

## 2023-08-14 NOTE — PROGRESS NOTES
Pt to [facility] for lab draw, doctors office visit, LP with IT chemo and chemotherapy administration.      Afebrile.  VSS.  Awake and alert in no acute distress.      Port accessed using a 22g 3/4 inch trevino needle with 1 attempt by Mely Green RN.  Labs drawn from the port without difficulty.   Pt tolerated well.      Pt to procedure room for LP.  Medications given per MAR.   Pt tolerated procedure well.    Chemotherapy dosage calculated independently by Huong Foster RN  and Dayna Damian RN and compared to road map for protocol [id number].  Calculations within 10% of written order.  Lab results reviewed.      Premedications and chemo given as ordered, see MAR.  Blood return verified prior to, during, and after chemotherapy infusion.  See Chemotherapy flowsheet.  PT tolerated well.  No side effects or complications noted.  Port flushed per orders (see MAR) and de-accessed after completion. PT home with mom after 1 hr lying supine.  Will return for next visit on 09/11/23.

## 2023-08-14 NOTE — PROGRESS NOTES
Pharmacy Chemotherapy Verification    Patient Name: Tomas Jean-Baptiste     Dx: pH+ B-Cell ALL         Protocol: Capizzi MTX  (On Study Arm B, ID number: [id number])         Allergies: Amoxicillin       There were no vitals taken for this visit.   There is no height or weight on file to calculate BSA.    All lab results 8/14/23 within treatment parameters.     Drug Order   (Drug name, dose, route, IV Fluid & volume, frequency, number of doses) Maintenance, Cycle 2, Day 1   Previous treatment: Maintenance, C1, D57 on 7/17/23      Medication = Vincristine (ONCOVIN)   Base Dose= 1.5 mg/m2  Calc Dose: Base Dose x 1.69 m2 = 2.535 mg  Final Dose = 2 mg (MAX)   Route = IV  Fluid & Volume = NS 25 mL  Admin Duration = Over 5 - 10 minutes    Days 1, 29, 57      <10% difference, okay to treat with final max dose   Medication = Methotrexate PF  Base Dose = 12 mg fixed dose  Calc Dose: n/a  Final Dose = 12 mg   Route = INTRATHECAL  Fluid & Volume = pf NS 6 mL  Admin Duration = IT per MD Days 1 and 29  No calculation required.   okay to treat with final dose   By my signature below, I confirm this process was performed independently with the BSA and all final chemotherapy dosing calculations congruent. I have reviewed the above chemotherapy order and that my calculation of the final dose and BSA (when applicable) corroborate those calculations of the  pharmacist. Discrepancies of 10% or greater in the written dose have been addressed and documented within the [facility] Progress notes.    Xochilt AlonzoD

## 2023-08-14 NOTE — PROGRESS NOTES
"Pharmacy Chemotherapy Calculations Note:    Dx: B-ALL (CNS3)  Cycle:  2 Maintenance Day 1   Previous treatment: Maint C1D57 on 7/17/23     Protocol: Maintenance per Arm B of [id number] [facility] ID number: [id number]       **JULY did receive cranial radiation, no LP on D29       Ht 1.65 m (5' 4.96\")   Wt 62 kg (136 lb 11 oz)   BMI 22.77 kg/m²  Body surface area is 1.69 meters squared.    MD to review any ordered labs     IT methotrexate 12 mg fixed dose for age >3 yrs              No calc req'd, ok for final dose = 12 mg IT inj    Vincristine 1.5 mg/m² (max 2 mg) x 1.69 m² = >2 mg   Max 2 mg, ok for final dose = 2 mg IV      Cherrie Decker, PharmD, BCOP              "

## 2023-08-14 NOTE — PROGRESS NOTES
Tomas Jean-Baptiste returns to clinic today for re-evaluation of his blood counts after holding chemotherapy (starting 7/27/2023 - first doses held 7/28/2023).  At the time of his acute visit with abdominal discomfort, nausea, vomiting and fatigue on Day 67 of Cycle 1 (7/27/2023), his WBC 0.4, Hgb 8.8, and platelets 125 with ANC of 170 with  and absolute monocyte count of 10.  His therapy was held and he was asked to return to clinic 8/2/2023 for repeat counts at which time his WBC 0.8, Hgb 8.2 and platelets 123 with ,  and absolute monocyte count of 200.  For these counts, hold was continued on all oral chemotherapy with the exception of imatinib which was continued.  Today he presents back for counts, now the 11th day of held doses.    Today, Tomas Roy present back to clinic for re-evaluation of his blood counts.    CBC today demonstrates WBC increased to 1.1, Hgb 8.4, platelets to 169 with ,  and absolute monocyte count of 260.    As ANC remains < 500, will continue hold of oral mercaptiopurine and oral methotrexate.  Will continue imatinib at 600 mg PO QAM per protocol.    Tomas Jean-Baptiste is scheduled to return back to clinic 8/11/2023 for repeat counts (per clinic availability).   has been instructed to HOLD doses of mercaptopurine, methotrexate AND imatinib through 8/11/2023 in the case that his counts remain un-recovered  what would be 14 days (2 weeks) and necessitate holding imatinib.      Hospital Outpatient Visit on 08/07/2023   Component Date Value    WBC 08/07/2023 1.1 (LL)     RBC 08/07/2023 2.25 (L)     Hemoglobin 08/07/2023 8.4 (L)     Hematocrit 08/07/2023 25.1 (L)     MCV 08/07/2023 111.6 (H)     MCH 08/07/2023 37.3 (H)     MCHC 08/07/2023 33.5     RDW 08/07/2023 75.4 (H)     Platelet Count 08/07/2023 169     MPV 08/07/2023 10.8     Neutrophils-Polys 08/07/2023 39.00 (L)     Lymphocytes 08/07/2023 32.20     Monocytes 08/07/2023 23.70  (H)     Eosinophils 08/07/2023 3.40     Basophils 08/07/2023 1.70     Nucleated RBC 08/07/2023 0.00     Neutrophils (Absolute) 08/07/2023 0.43 (LL)     Lymphs (Absolute) 08/07/2023 0.35 (L)     Monos (Absolute) 08/07/2023 0.26     Eos (Absolute) 08/07/2023 0.04     Baso (Absolute) 08/07/2023 0.02     NRBC (Absolute) 08/07/2023 0.00     Anisocytosis 08/07/2023 1+     Macrocytosis 08/07/2023 1+     Manual Diff Status 08/07/2023 PERFORMED     Peripheral Smear Review 08/07/2023 see below     Plt Estimation 08/07/2023 Normal     RBC Morphology 08/07/2023 Present      Study Language:     Imatinib During Maintenance:      If ANC falls below 500/L or if platelet count falls below 50,000/L: continue imatinib but hold 6MP and methotrexate, as indicated below. If after 2 weeks of holding methotrexate/6MP, ANC remains below 500/L or platelet count below 50,000/L, imatinib should be held. Imatinib should be restarted without dose reduction once counts recover (ANC > 500/?L, platelets > 50,000 ?L).      Mercaptopurine and Methotrexate During Maintenance:       Myelosuppression: Doses of MP and MTX may be adjusted as needed during maintenance to maintain consistent drug administration without significant myelosuppression. If neutrophil count falls below 500/L or if platelet count falls below 50,000/L during Maintenance, MP and MTX should be held until recovery above these levels. For the first drop in ANC or platelets, resume chemotherapy (both MP and MTX) at the same dose the patient was taking prior to the episode of myelosuppression. If neutrophil count falls below 500/L or if platelet count falls below 50 000/L for a second (or greater) time, discontinue doses of MP and MTX until ANC is ? 750/?L and platelets are ? 75 000/?L. Restart both MP and MTX at 50% of the dose prescribed at the time the medication was stopped. Then continue to increase to 75% and then 100% of the dose prescribed prior to stopping the medication at 2-4  "week intervals provided ANC remains ? 750/?L and platelets remain ? 75 000/?L. May increase both MP and MTX simultaneously. Consider discontinuing TMP/SMX as per COG Supportive care Guidelines at: https://childrensoncologygroup.org/index.php/cog-supportive-care-guidelines. If neutrophil count falls below 500/?L or if platelet count falls below 50,000/?L on > 2 occasions during Maintenance, perform thiopurine pharmacology testing as described below, if not done previously. Should therapy be withheld for myelosuppression or elevated transaminases, do not \"make up\" that week. Resume therapy at the correct point, chronologically.   "

## 2023-08-14 NOTE — PROCEDURES
Pediatric Oncology Lumbar Puncture  Procedure Note      Patient Name:  Tomas Jean-Baptiste  : 2000   MRN: [id number]    Service Location:  [facility]  Date of Service: 2023  Time: 1: 59 PM    Procedure Performed By: Peep Faye M.D.    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  He presents today for scheduled chemotherapy, Maintenance, Cycle 2, Day 1 and scheduled lumbar puncture with intrathecal chemotherapy.  Prior to the procedure, the risks and benefits were discussed with the patient.  Consent for the procedure was signed by patient and placed in the patient's chart.  All pertinent labs and history were reviewed and a complete History and Physical Examination were performed and placed in the medical record.  Tomas Roy was then taken to the procedure room where the procedure was performed.  All necessary safety equipment per ASA guidelines were available.  A time-out was performed and the patient identified by name,  and medical record number.  Gloves and mask were worn during the entire procedure.  Tomas Roy was prepped and draped in the usual sterile fashion with povoiodine.  He was positioned in the left decubitus  position and all landmarks including superior posterior iliac crest, umbilicus and vertebral bodies were identified by palpation.  A single dose of Versed 2 mg IV was given for anxiolysis.  At that point, a  3.5 in, 22 gauge spinal needle was introduced into the L3-L4 spinal interspace however due to patient tensing up, unable to obtain cerebrospinal fluid.  Given inadequate anxiolysis, an additional 1 mg Versed was administered which resulted in the patient relaxing appropriately.  A second attempt was made with a 3.5 inch, 22-gauge spinal needle at the L3-L4 spinal interspace which resulted in 6 ml of clear fluid were obtained and sent for cell count and cytology.  Methotrexate 12 mg in 6 mL of NS was verified with the nurse bedside and then then administered into the spinal fluid. Tomas Roy tolerated the procedure without complication or bleeding.  He was instructed to lie flat for an hour following the procedure.    Results:    PENDING    Pepe Faye MD  Pediatric Hematology / Oncology  [facility]  Cell.  [phone]

## 2023-08-14 NOTE — PROGRESS NOTES
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 started to drop at that point with WBC 0.9 and ANC dipping to 590.  On 7/27/2023, present Tomas Roy to clinic with nausea, vomiting, abdominal discomfort as well as decreased fatigue.  Blood counts obtained at the time demonstrated a WBC of 0.4, Hgb 8.8 and platelets 125.  ANC at the time was measured at only 170.  JULY was otherwise clinically well appearing.  He did receive a one-time normal saline bolus for his nausea and vomiting and was sent home with instructions to HOLD his oral mercaptopurine and oral methotrexate which began being held on 7/28/2023.  Per protocol, imatinib was continued at previous dose of 600 mg in the morning. Tomas Roy would return to clinic again on 8/2/2023 and 8/7/2023.  On both occasions, ANC remained under 500 and oral therapy (with the exception of imatinib) continue to be held while awaiting count recovery.   Ultimately, on 8/11/2023 after 14 days of therapy being held, Tomas Roy returned to clinic and WBC had increased to 2.1 with ANC increasing to 1500 with an absolute monocyte count of 290. Tomas Roy was then instructed to resume his oral chemotherapy at 100% of prior dosing with (due to timing with Day 1 of Cycle 2 with LP 3 days later, no weekly MTX was administered).  Imatinib was never held.  Interval history is remarkable for nausea, vomiting and dizziness as of yesterday.    Today, Tomas Roy presents in fair to good clinical health.  He does report that yesterday he had significantly increased nausea with several episodes of emesis of yellowish stomach acid only.  He also reports a couple of episodes of dizziness with standing to go to the bathroom.  He denies any of the symptoms as of last Friday when he was seen in clinic or Saturday.  Symptoms only began yesterday. Tomas Roy denies any headaches, changes in vision or neurologic status changes with the exception of dizziness when standing.  He denies any fevers or chills or systemic symptoms of illness.  No complaints of any aches or pains.  No skin changes or rashes. Tomas Roy denies any easy bruising or bleeding.  For his nausea, he reports that he was taking Ativan regularly yesterday and as such slept for most of the day.  Last night he was finally able to take some sips of Gatorade which did not upset his stomach.    Review of Systems:     Constitutional: Afebrile.  HENT: Negative for auditory changes, nosebleeds and sore throat.  No mouth sores.  Eyes: Negative for visual changes.  Respiratory: Negative for  shortness of breath and stridor.   Cardiovascular: Negative for chest pain and leg swelling.    Gastrointestinal: Negative for nausea, vomiting, abdominal pain, diarrhea, constipation and blood in stool.    Genitourinary: Negative for dysuria and flank pain.    Musculoskeletal: Positive for chronic filgrastim  induced bone pain.    Skin: Negative for rash, signs of infection.  Neurological: Negative for numbness, tingling, sensory changes, weakness or headaches.    Endo/Heme/Allergies: Does not bruise/bleed easily.    Psychiatric/Behavioral: No changes in mood, appropriate for age.     PAST MEDICAL HISTORY:     Oncologic History:  2-3 week history of worsening fatigue, right lower extremity pain  Presentation to OS and diagnosed with right LE superficial thrombus, subsegmental PE and hyperleukocytosis, anemia and thrombocytopenia  Transferred to [facility] for definitive care  Presenting (local) WBC > 440K, Hgb 10.0, platelets 53, (automated differential ANC 3190, ALC 75,310, absolute monocyte count 01090, absolute blast count 340,560)  Uric Acid 15.6, phosphorus 5.6, LDH 1114  Rasburicase x 1 dose given   Peripheral Blood flow cytometry demonstrating CD10 pos, CD19 pos, CD20 neg, CD22 dim (60%) 5/28/2022  Peripheral blood FISH for BCR-ABL1 positive in 98% of analyzed cells     Age at Diagnosis: 21 years  White Blood Cell Count at Presentation: > 440 k/uL  Testicular Disease Status: Negative (see procedure note 5/30/2022)  CNS Status: 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, diagnostic LP with WBC 1, RBC 3 and no evidence of leukemic blasts 5/30/2022  Steroid Pre-treatment: None  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 for [facility] purposes:  Flow cytom  Of the blood pressure little  high that is a problem is a cultural problem is well and cultural genetic and everything else like that unfortunately breathalyzers such bad heart disease diabetes things like that  populations etry consistent with peripheral blood, cytogenetics remarkable for known t(9;22)  CSF with WBC 1, RBC 3, no blasts identified on cytospin  FISH results available 5/31/2022 making patient eligible for transfer from [place] [address] to [place] [address] as eligibility requirements were met for [place] [address]  Patient unenrolled from [place] [address] (BCR-ABL1 fusion positive) 6/1/2022  Consent signed for [place] [address] and patient enrolled 6/1/2022 (see eligibility checklist from 5/31/2022 and consent note from 6/1/2022)  Imatinib 400 mg PO QAM / 200 mg PO QPM started 6/3/2022 (allowed per [place] [address])  Patient completed the first 15 days of a Standard 4-drug Induction on 6/13/2022  Start of [place] [address](OS), Induction IA Part 2, Day 15 6/13/2022  End of Induction 1A Part 2 - MRD negative  Start of [place] [address](OS), Induction IA Part 2, Day 15 7/5/2022  Induction IB DELAYED 2 weeks 14 days from 7/26/2022-8/9/2022) for myelosuppression - Start of Day 22 cytarabine block 8/9/2022  Induction IB Day 42 delayed 9 days for myelosuppression - Day 42 evaluations 9/7/2022  End of Induction IB - Flow cytometric MRD negative, MRD by IgH-TCR PCR [id number]%  Randomization to AR-Experimental Arm B of [place] [address]  Start of AR-Experimental Arm B, Interim Maintenance 9/29/2022  Weight based increase in dose of imatinib to 400 mg PO AM and 250 mg PM 9/29/2022  Thrombocytopenia not permissive of proceeding with Day 15 of Interim Maintenance  AR-Experimental Arm B, Interim Maintenance, Day 15 delayed 4 days, start 10/17/2022  AR-Experimental Arm B, Interim Maintenance, Day 29, start 11/1/2022  AR-Experimental Arm B, Interim Maintenance, Day 43, start 11/14/2022  Last does of 6-MP 11/27/2022  AR-Experimental Arm B, Delayed Intensification, Day 1, start 12/6/2022  Admission  "with Severe Septic Shock 12/27/2022  Imatinib HELD 12/27/2022-1/8/2023  AR-Experimental Arm B, Delayed Intensification, Day 29 DELAYED 14 days with start 1/17/2023  Hospitalization 2/7/2023 on Day 50 of Delayed Intensification with left shoulder pain ultimately diagnosed with Bacteroides fragilis infection  AR-Experimental Arm B, Interim Maintenance, Day 1 DELAYED 7 days with start 2/27/2023  AR-Experimental Arm B, Interim Maintenance, Day 11 DELAYED 4 days for platelets 43K on 3/9/2023  AR-Experimental Arm B, Interim Maintenance, Day 11 VCR given and MTX omitted for platelets 43K 3/13/2023  Imatinib HELD 3/30/2023-4/11/2023  AR-Experimental Arm B, Interim Maintenance, Day 21 (DELAYED 22 DAYS) - administered 4/11/2023 with methotrexate 80 mg/m² IV  AR-Experimental Arm B, Interim Maintenance, Day 31 (\"true\" Day 31 4/25/2023) - VCR and IT MTX given 4/28/2023 - IV MTX omitted  AR-Experimental Arm B, Interim Maintenance, Day 41 met with counts - administered 5/5/2023 with methotrexate 80 mg/m² IV     Start of Maintenance, Cycle 1, Day 1 -5/22/2023  Cranial radiation 6/5/2023-6/16/2023 (10 fractions)  Maintenance, Cycle 1, Day 29 - 6/23/2023 (no IT MTX given)  Maintenance, Cycle 1, Day 67, presented with fatigue nausea and vomiting found to have ANC less than 500  Methotrexate (oral) and mercaptopurine held 7/27/2023 - continued with imatinib  Methotrexate (oral) and mercaptopurine held 8/2/2023 - continued with imatinib  Methotrexate (oral) and mercaptopurine held 8/7/2023 - continued with imatinib  Restarted methotrexate (oral) and mercaptopurine at 100% previous dosing on 8/11/2023 - continued with imatinib    Past Medical History:    1) Previously Healthy  2) 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  3) Hyperleukocytosis  4) Hyperuricemia  5) Hyperphosphatemia  6) Right Lower Extremity Superficial Thrombus  7) Subsegmental Pulmonary Embolism  8) 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  9) Bacteroides fragilis soft " tissue infection left shoulder     Past Surgical History:     1) Temporary Right IJ Pharesis Catheter Placement 5/28/2022  2) Right-sided Port-A-Cath placement 8/29/2022  3) IR drainage left calf hematoma  4) Left shoulder I&D  5) Cranial XRT 6/5/2023-6/16/2023     Birth/Developmental History:   1st of three children  Unremarkable pregnancy  Unremarkable delivery     Allergies:             Allergies as of 05/27/2022 - Reviewed 05/27/2022   Allergen Reaction Noted    Amoxicillin   04/03/2020      Social History:   Lives at home with mother, brother and sister.  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   Two dogs. Father not involved.     Family History:     Family History             Family History   Problem Relation Age of Onset    No Known Problems Mother      Diabetes Paternal Grandfather      Hypertension Paternal Grandfather      Hyperlipidemia Paternal Grandfather      Cancer Neg Hx      Heart Disease Neg Hx      Stroke Neg Hx           No significant family history of cancer.  Both maternal and paternal family history of diabetes.     Immunizations:  UTD     Medications:   Current Outpatient Medications on File Prior to Encounter   Medication Sig Dispense Refill    mercaptopurine (PURINETHOL) 50 MG Tab Take 2.5 tablets by mouth in the evening x 6 days and 3 tablets by mouth in the evening x 1 day each week. 72 Tablet 5    famotidine (PEPCID) 20 MG Tab Take 1 Tablet by mouth 2 times a day. 60 Tablet 1    methotrexate 2.5 MG Tab Take 14 Tablets (all at the same time) by mouth every 7 days on weeks when not receiving lumbar puncture 56 Tablet 5    imatinib (GLEEVEC) 100 MG tablet Take 2 Tablets by mouth every day. Pt takes 200 mg + 400 mg for a total dose of 600 mg daily 60 Tablet 5    sulfamethoxazole-trimethoprim (BACTRIM DS) 800-160 MG tablet Take 2 Tablets by mouth twice daily two days a week. 48 Tablet 11    Melatonin 5 MG Cap Take 5 mg by mouth at bedtime as needed.      ondansetron (ZOFRAN ODT) 8 MG TABLET DISPERSIBLE  "Dissolve 1 Tablet by mouth every 6 hours as needed for Nausea/Vomiting. 10 Tablet 0    predniSONE (DELTASONE) 10 MG Tab Take 3.5 tablets by mouth in the morning and evening for 5 Days at Day 1, 29 and 57 of each cycle. 35 Tablet 6    imatinib (GLEEVEC) 400 MG tablet Take 1 Tablet by mouth every day. Pt takes 200 mg + 400 mg for a total dose of 600 mg daily 30 Tablet 5    Gabapentin, Once-Daily, 300 MG Tab Take 300 mg by mouth 3 times a day. (Patient not taking: Reported on 8/14/2023) 180 Tablet 0     No current facility-administered medications on file prior to encounter.         OBJECTIVE:     Vitals:   BP 98/56   Pulse 86   Temp 37.7 °C (99.8 °F) (Temporal)   Resp 20   Ht 1.65 m (5' 4.96\")   Wt 62 kg (136 lb 11 oz)   SpO2 99%     Labs:  Hospital Outpatient Visit on 08/14/2023   Component Date Value    WBC 08/14/2023 2.8 (L)     RBC 08/14/2023 2.50 (L)     Hemoglobin 08/14/2023 9.3 (L)     Hematocrit 08/14/2023 27.6 (L)     MCV 08/14/2023 110.4 (H)     MCH 08/14/2023 37.2 (H)     MCHC 08/14/2023 33.7     RDW 08/14/2023 65.4 (H)     Platelet Count 08/14/2023 271     MPV 08/14/2023 9.4     Neutrophils-Polys 08/14/2023 70.70     Lymphocytes 08/14/2023 11.30 (L)     Monocytes 08/14/2023 16.50 (H)     Eosinophils 08/14/2023 0.40     Basophils 08/14/2023 0.40     Immature Granulocytes 08/14/2023 0.70     Nucleated RBC 08/14/2023 0.00     Neutrophils (Absolute) 08/14/2023 2.01     Lymphs (Absolute) 08/14/2023 0.32 (L)     Monos (Absolute) 08/14/2023 0.47     Eos (Absolute) 08/14/2023 0.01     Baso (Absolute) 08/14/2023 0.01     Immature Granulocytes (a* 08/14/2023 0.02     NRBC (Absolute) 08/14/2023 0.00     Sodium 08/14/2023 136     Potassium 08/14/2023 3.6     Chloride 08/14/2023 103     Co2 08/14/2023 20     Anion Gap 08/14/2023 13.0     Glucose 08/14/2023 117 (H)     Bun 08/14/2023 5 (L)     Creatinine 08/14/2023 0.57     Calcium 08/14/2023 8.3 (L)     Correct Calcium 08/14/2023 8.3 (L)     AST(SGOT) 08/14/2023 " 31     ALT(SGPT) 08/14/2023 53 (H)     Alkaline Phosphatase 08/14/2023 106 (H)     Total Bilirubin 08/14/2023 0.8     Albumin 08/14/2023 4.0     Total Protein 08/14/2023 6.5     Globulin 08/14/2023 2.5     A-G Ratio 08/14/2023 1.6     Direct Bilirubin 08/14/2023 <0.2     Indirect Bilirubin 08/14/2023 see below     GFR (CKD-EPI) 08/14/2023 142     Cytology Reg 08/14/2023 See Path Report         Physical Exam:    Constitutional: Well-developed, well-nourished, and in no distress.    HENT: Normocephalic and atraumatic. No nasal congestion or rhinorrhea. Oropharynx is clear and moist. No oral ulcerations or sores.    Eyes: Conjunctivae are normal. Pupils are equal, round, and reactive to light.    Neck: Normal range of motion of neck, no adenopathy.    Cardiovascular: Normal rate, regular rhythm and normal heart sounds.  No murmur heard. DP/radial pulses 2+, cap refill < 2 sec  Pulmonary/Chest: Effort normal and breath sounds normal. No respiratory distress. Symmetric expansion.  No crackles or wheezes.  Abdomen: Soft. Bowel sounds are normal. No distension and no mass. There is no hepatosplenomegaly.    Genitourinary:  Deferred  Musculoskeletal: Normal range of motion of lower and upper extremities bilaterally. No tenderness to palpation of elbows, wrists, hands, knees, ankles and feet bilaterally.   Lymphadenopathy: No cervical adenopathy, axillary adenopathy or inguinal adenopathy.   Neurological: Alert and oriented to person and place. Exhibits normal muscle tone bilaterally in upper and lower extremities. Gait normal. Coordination normal.    Skin: Skin is warm, dry and pink.  No rash or evidence of skin infection.  No pallor.   Psychiatric: Mood and affect normal for age.      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 and whose End of Induction IB MRD was both negative by flow cytometry and PCR  who presents for Maintenance, Cycle 2, Day 1 therapy     1) Ph+ Precursor B-Cell Acute Lymphoblastic Leukemia, in MRD Remission:  - 2-3 weeks of symptoms  - Presenting WBC > 440 k/uL, hyperleukocytosis  - Start of Hydroxyurea (1500 mg PO Q8) 2320 on 5/27/2022  - discontinued after only 55 hours  - No steroid pretreatment  - 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  - Testicular status NEGATIVE       - Flow cytometry of both 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, FISH positive for BCR-ABL1 translocation  - [place] [facility] [id number]  - Initially [place] [facility] [id number] - but 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                            - [place] [facility] [id number] and began study 6/13/2022              - Started imatinib therapy 6/3/2022   - End of Induction IA and IB MRD negative  - Imatinib dose increased to 400 mg PO AM and 250 mg PM 9/29/2022  -* Note:  All imatinib doses given inpatient rounded down to 600 mg  - Imatinib held for Septic Shock 12/27/2022-1/8/2023  - Imatinib 600 mg PO daily restarted 1/8/2023 inpatient  - Imatinib 400 mg PO QAM and 250 mg PO QHS 1/14/2023-1/17/2023  - Imatinib 600 mg PO QAM 1/17/2023 - 3/30/2023  - Imatinib 600 mg PO QAM 4/11/2023 - 8/13/2023  - Imatinib 550 mg PO QAM 8/14/2023 - PRESENT    - Given change in BSA today to 1.69, will REDUCE dose of imatinib to 550 mg PO QAM                 - WBC 2.8, Hgb 9.3, platelets 271             - ANC 2010, , absolute monocyte count 470                - AST 31, ALT 53, bilirubin 0.8                - ANC 2010, platelet count 271.  Counts permissible to continue mercaptopurine and methotrexate.  Given drop in weight and prior history of myelosuppression, can drop dose of mercaptopurine to 2.5 tablets x 6 days (100% dosing).  Continue methotrexate at 14 tablets (13.52 tablet = 100%).     [id number], AR Arm B, Maintenance, Cycle 2, Day 1:      ** Decrease imatinib to 550 mg PO daily  **  Methotrexate 12 mg IT x 1 dose (See separate procedure note)  ** Being prednisone 35 mg PO BID x 5 day pulse               ** Continue methotrexate 14 tablets PO weekly (given current weight 103.55% of expected dose)               ** Decrease 6-mercaptopurine 2.5 tablets = 125 mg PO daily x 7 days  (given current weight 98.62% of expected dose)     - Return to clinic 2 weeks for repeat counts, return to clinic 1 month for Day 29 of Cycle 2 of Maintenance (NO LP as CNS3 and has received XRT)     2) Nausea and Vomiting:            - NS bolus 20 mL/kg in clinic with improvement  in clinic today           - Continue antiemetics at home   - Will begin Prilosec today for acid reflux     3) Transaminitis (MILD, IMPROVED):              - AST 31, ALT 53, total bilirubin 0.8  - Continue to monitor now taking 6-mercaptopurine and oral methotrexate     4) Chemotherapy Related Pancytopenia:  - WBC 2.8, Hgb 9.3, platelets 271          - ANC 2010, , absolute monocyte count 470  - Transfuse for hemoglobin less than 7.5 or symptomatic  - Transfuse for platelets less than 10,000 or symptomatic  - No transfusions necessary today     5) At Risk of Opportunistic Lung Infection:  - Bactrim DS PO BID Sat and Sun for PJP prophylaxis     6) Central Access:   - R Port-A-Cath in place      7) Research Participant: ON STUDY [id number], AR Arm B, Maintenance, Cycle 2, Day 1             [facility] - Source Data         Diagnosis: Ph+ Precursor B-Cell Acute Lymphoblastic Leukemia     Disease Status: EOI1A MRD NEGATIVE, EOI1B RD NEGATIVE, CNS3c, testicular negative, HSV1 IgG POSITIVE, CMV IgG NEGATIVE, VARICELLA IgG POSITIVE     Active Studies: [id number], [id number]                                                                                                      Inactive Studies: [id number]                                                                                                                                                 Optional Studies: None             Protocol: International Phase 3 trial in Alexis 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      Treatment Plan: [id number](OS), AR-Arm B, Maintenance, Cycle 2, Day 1 (8/14/2023)     Height: 1.65 m      Weight: 62.0 kg       BSA: 1.69 m²   (Maintenance, Cycle 2, Day 1 8/14/2023)                                                                                                                                           Performance Status: Karnofsky 80, ECOG 1 (8/14/2023)     Treatment Plan Medications:  (100% adherent with all oral medications)     Oral mercaptopurine and oral methotrexate held 7/27/2023 (first dose held 7/28/2023) and resumed 8/11/2023 due to myelosuppression (total of 14 days of held chemotherapy).  Imatinib not held.    Today, weight adjust Imatinib to 550 mg QAM - next weight based adjustment Cycle 3, Day 1    Weight adjust Mercaptopurine to 125 mg PO QHS x 7 days  Continue Methotrexate 35 mg PO weekly (on weeks without LP)     Evaluations / Study Labs:  (8/14/2023)    Ht/Wt/BSA and PE as above.  Updated BSA for all calculations.    WBC 2.8, Hgb 9.3, platelets 271  ANC 2010, , absolute monocyte count 470    AST 31, ALT 53, total bilirubin 0.8, direct bilirubin <0.2    CSF cell count and cytology PENDING     Therapy Given: (7/27/2023)     Methotrexate 12 mg IT  Vincristine 2 mg IV    Oral chemotherapy as above     Maintenance, Cycle 2 Toxicities:    None currently         Disposition: Return to clinic 2 weeks for reevaluation of labs, return to clinic in 1 month for Cycle 2, Day 29 therapy.    Pepe Faye MD  Pediatric Hematology / Oncology  [facility]  Cell.  [phone]  Office. [phone]

## 2023-08-15 ENCOUNTER — PHARMACY VISIT (OUTPATIENT)
Dept: PHARMACY | Facility: MEDICAL CENTER | Age: 23
End: 2023-08-15
Payer: COMMERCIAL

## 2023-08-15 ENCOUNTER — OFFICE VISIT (OUTPATIENT)
Dept: PSYCHOLOGY | Facility: MEDICAL CENTER | Age: 23
End: 2023-08-15
Attending: PSYCHOLOGIST
Payer: COMMERCIAL

## 2023-08-15 DIAGNOSIS — C91.Z0 B LYMPHOBLASTIC LEUKEMIA WITH T(9;22)(Q34;Q11.2);BCR-ABL1 (HCC): ICD-10-CM

## 2023-08-15 PROCEDURE — 96156 HLTH BHV ASSMT/REASSESSMENT: CPT | Performed by: PSYCHOLOGIST

## 2023-08-15 PROCEDURE — 2023F DILAT RTA XM W/O RTNOPTHY: CPT | Performed by: PSYCHOLOGIST

## 2023-08-15 NOTE — ADDENDUM NOTE
Encounter addended by: Cherrie Decker, PharmD on: 8/15/2023 8:36 AM   Actions taken: Clinical Note Signed

## 2023-08-15 NOTE — ADDENDUM NOTE
Encounter addended by: Caren Saravia R.N. on: 8/15/2023 8:46 AM   Actions taken: Charge Capture section accepted

## 2023-08-15 NOTE — ADDENDUM NOTE
Encounter addended by: Kayla Juarez PharmD on: 8/15/2023 8:40 AM   Actions taken: Clinical Note Signed

## 2023-08-15 NOTE — PROGRESS NOTES
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 nausea, vomiting and fatigue.  Blood counts were obtained and ANC was noted to be 170 prompting his oral mercaptopurine and oral methotrexate to be held for myelosuppression.  Dosing on 7/28/2023 was held.   Imatinib was continued however at previous dose of 600 mg every morning.  A fluid bolus was also provided given complaints of nausea and vomiting. Tomas Roy responded well and improved quickly.  He was again seen on 8/2/2023 and 8/7/2023 for repeat labs.  At both visits, ANC had improved but remained under 500 and oral 6-MP and methotrexate continued to be held with continuation of imatinib as per protocol.  Today, presents back to clinic Tomas Roy on Day 82 of Cycle 1.  Today would be the 15th day of held therapy and if counts were low would have prompted a hold on imatinib.  Interval history is unremarkable.    Today, Tomas  Kirill presents in very good clinical health.  He reports that his energy and activity have improved considerably.  No complaints of any headaches, changes in vision or neurologic status changes.  No shortness of breath or difficulty breathing.  No complaints of any fatigue and energy is reported as being at baseline.  Eating and drinking well without any significant nausea or vomiting.  No abdominal discomfort or pain.  No complaints of any changes in bowel or bladder function.  No new aches or pains.  No changes in skin.  No rashes.  No easy bruising or bleeding.  No other concerns or complaints at this time. Tomas Roy reports that he has held this morning's dose of imatinib in the event that his counts are low.  He is prepared to take his dose if counts have recovered.    Review of Systems:     Constitutional: Afebrile.  Without recent illness.  Energy and activity are good.  Appetite and oral intake are good.  HENT: Negative for ear pain, nasal congestion or rhinorrhea, nosebleeds and sore throat.  No mouth sores.  Eyes: Negative for visual changes.  Respiratory: Negative for shortness of breath.  No cough.  Cardiovascular: Negative.  Gastrointestinal: Negative for nausea, vomiting, abdominal pain, diarrhea, constipation.  Genitourinary: Negative.  Musculoskeletal: Negative for joint or muscle pains.    Skin: Negative for rash, signs of infection.  Neurological: Negative for numbness, tingling, sensory changes, weakness or headaches.    Endo/Heme/Allergies: Does not bruise/bleed easily.    Psychiatric/Behavioral: No changes in mood, appropriate for age.     PAST MEDICAL HISTORY:     Oncologic History:  2-3 week history of worsening fatigue, right lower extremity pain  Presentation to OS and diagnosed with right LE superficial thrombus, subsegmental PE and hyperleukocytosis, anemia and thrombocytopenia  Transferred to [facility] for definitive care  Presenting (local) WBC > 440K, Hgb 10.0, platelets 53,  (automated differential ANC 3190, ALC 75,310, absolute monocyte count 83936, absolute blast count 340,560)  Uric Acid 15.6, phosphorus 5.6, LDH 1114  Rasburicase x 1 dose given   Peripheral Blood flow cytometry demonstrating CD10 pos, CD19 pos, CD20 neg, CD22 dim (60%) 5/28/2022  Peripheral blood FISH for BCR-ABL1 positive in 98% of analyzed cells     Age at Diagnosis: 21 years  White Blood Cell Count at Presentation: > 440 k/uL  Testicular Disease Status: Negative (see procedure note 5/30/2022)  CNS Status: 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, diagnostic LP with WBC 1, RBC 3 and no evidence of leukemic blasts 5/30/2022  Steroid Pre-treatment: None  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     All inclusion/exclusion criteria for [id number] met and consent signed - enrolled 5/29/2022   All inclusion/exclusion criteria for [id number] met and consent signed - enrolled 5/30/2022  Confirmatory bone marrow aspirate and biopsy and diagnostic LP + cytarabine 70 mg IT 5/30/2022  Induction therapy (ON STUDY [id number]) started 5/30/2022  Bone marrow immunohistochemistry consistent with diagnosis of B-ALL comprising 90% of marrow cellularity  Bone marrow sample sent to [facility] for [facility] purposes:  Flow cytom  Of the blood pressure little high that is a problem is a cultural problem is well and cultural genetic and everything else like that unfortunately breathalyzers such bad heart disease diabetes things like that  populations etry consistent with peripheral blood, cytogenetics remarkable for known t(9;22)  CSF with WBC 1, RBC 3, no blasts identified on cytospin  FISH results available 5/31/2022 making patient eligible for transfer from [place] [address] to [place] [address] as eligibility requirements were met for [place] [address]  Patient unenrolled from [place] [address] (BCR-ABL1 fusion positive) 6/1/2022  Consent signed for [place] [address] and patient enrolled 6/1/2022 (see  eligibility checklist from 5/31/2022 and consent note from 6/1/2022)  Imatinib 400 mg PO QAM / 200 mg PO QPM started 6/3/2022 (allowed per [id number])  Patient completed the first 15 days of a Standard 4-drug Induction on 6/13/2022  Start of [facility] [place] [place] [id number](OS), Induction IA Part 2, Day 15 6/13/2022  End of Induction 1A Part 2 - MRD negative  Start of [facility]1631(OS), Induction IA Part 2, Day 15 7/5/2022  Induction IB DELAYED 2 weeks 14 days from 7/26/2022-8/9/2022) for myelosuppression - Start of Day 22 cytarabine block 8/9/2022  Induction IB Day 42 delayed 9 days for myelosuppression - Day 42 evaluations 9/7/2022  End of Induction IB - Flow cytometric MRD negative, MRD by IgH-TCR PCR [id number]%  Randomization to AR-Experimental Arm B of [id number]  Start of AR-Experimental Arm B, Interim Maintenance 9/29/2022  Weight based increase in dose of imatinib to 400 mg PO AM and 250 mg PM 9/29/2022  Thrombocytopenia not permissive of proceeding with Day 15 of Interim Maintenance  AR-Experimental Arm B, Interim Maintenance, Day 15 delayed 4 days, start 10/17/2022  AR-Experimental Arm B, Interim Maintenance, Day 29, start 11/1/2022  AR-Experimental Arm B, Interim Maintenance, Day 43, start 11/14/2022  Last does of 6-MP 11/27/2022  AR-Experimental Arm B, Delayed Intensification, Day 1, start 12/6/2022  Admission with Severe Septic Shock 12/27/2022  Imatinib HELD 12/27/2022-1/8/2023  AR-Experimental Arm B, Delayed Intensification, Day 29 DELAYED 14 days with start 1/17/2023  Hospitalization 2/7/2023 on Day 50 of Delayed Intensification with left shoulder pain ultimately diagnosed with Bacteroides fragilis infection  AR-Experimental Arm B, Interim Maintenance, Day 1 DELAYED 7 days with start 2/27/2023  AR-Experimental Arm B, Interim Maintenance, Day 11 DELAYED 4 days for platelets 43K on 3/9/2023  AR-Experimental Arm B, Interim Maintenance, Day 11 VCR given and MTX omitted for platelets 43K 3/13/2023  Imatinib HELD  "3/30/2023-4/11/2023  AR-Experimental Arm B, Interim Maintenance, Day 21 (DELAYED 22 DAYS) - administered 4/11/2023 with methotrexate 80 mg/m² IV  AR-Experimental Arm B, Interim Maintenance, Day 31 (\"true\" Day 31 4/25/2023) - VCR and IT MTX given 4/28/2023 - IV MTX omitted  AR-Experimental Arm B, Interim Maintenance, Day 41 met with counts - administered 5/5/2023 with methotrexate 80 mg/m² IV     Start of Maintenance, Cycle 1, Day 1 -5/22/2023  Cranial radiation 6/5/2023-6/16/2023 (10 fractions)  Maintenance, Cycle 1, Day 29 - 6/23/2023 (no IT MTX given)  Maintenance, Cycle 1, Day 67, presented with fatigue nausea and vomiting found to have ANC less than 500  Methotrexate (oral) and mercaptopurine held 7/27/2023 - continued with imatinib  Methotrexate (oral) and mercaptopurine held 8/2/2023 - continued with imatinib  Methotrexate (oral) and mercaptopurine held 8/7/2023 - continued with imatinib  Methotrexate (oral) and mercaptopurine restarted at 100% of expected dose 8/11/2023 - continue with imatinib      Past Medical History:    1) Previously Healthy  2) 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  3) Hyperleukocytosis  4) Hyperuricemia  5) Hyperphosphatemia  6) Right Lower Extremity Superficial Thrombus  7) Subsegmental Pulmonary Embolism  8) 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  9) Bacteroides fragilis soft tissue infection left shoulder     Past Surgical History:     1) Temporary Right IJ Pharesis Catheter Placement 5/28/2022  2) Right-sided Port-A-Cath placement 8/29/2022  3) IR drainage left calf hematoma  4) Left shoulder I&D  5) Cranial XRT 6/5/2023-6/16/2023     Birth/Developmental History:   1st of three children  Unremarkable pregnancy  Unremarkable delivery     Allergies:             Allergies as of 05/27/2022 - Reviewed 05/27/2022   Allergen Reaction Noted    Amoxicillin   04/03/2020      Social History:   Lives at home with mother, brother and sister.  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   Two dogs. Father " not involved.     Family History:     Family History             Family History   Problem Relation Age of Onset    No Known Problems Mother      Diabetes Paternal Grandfather      Hypertension Paternal Grandfather      Hyperlipidemia Paternal Grandfather      Cancer Neg Hx      Heart Disease Neg Hx      Stroke Neg Hx           No significant family history of cancer.  Both maternal and paternal family history of diabetes.     Immunizations:  UTD    Medications:   Current Outpatient Medications on File Prior to Encounter   Medication Sig Dispense Refill    mercaptopurine (PURINETHOL) 50 MG Tab Take 2.5 tablets by mouth in the evening x 6 days and 3 tablets by mouth in the evening x 1 day each week. 72 Tablet 5    predniSONE (DELTASONE) 10 MG Tab Take 3.5 tablets by mouth in the morning and evening for 5 Days at Day 1, 29 and 57 of each cycle. 35 Tablet 6    famotidine (PEPCID) 20 MG Tab Take 1 Tablet by mouth 2 times a day. 60 Tablet 1    methotrexate 2.5 MG Tab Take 14 Tablets (all at the same time) by mouth every 7 days on weeks when not receiving lumbar puncture 56 Tablet 5    imatinib (GLEEVEC) 400 MG tablet Take 1 Tablet by mouth every day. Pt takes 200 mg + 400 mg for a total dose of 600 mg daily 30 Tablet 5    imatinib (GLEEVEC) 100 MG tablet Take 2 Tablets by mouth every day. Pt takes 200 mg + 400 mg for a total dose of 600 mg daily 60 Tablet 5    Gabapentin, Once-Daily, 300 MG Tab Take 300 mg by mouth 3 times a day. (Patient not taking: Reported on 8/14/2023) 180 Tablet 0    sulfamethoxazole-trimethoprim (BACTRIM DS) 800-160 MG tablet Take 2 Tablets by mouth twice daily two days a week. 48 Tablet 11    Melatonin 5 MG Cap Take 5 mg by mouth at bedtime as needed.      ondansetron (ZOFRAN ODT) 8 MG TABLET DISPERSIBLE Dissolve 1 Tablet by mouth every 6 hours as needed for Nausea/Vomiting. 10 Tablet 0     No current facility-administered medications on file prior to encounter.         OBJECTIVE:     Vitals:   BP  108/65   Pulse (!) 105   Temp 37.3 °C (99.2 °F) (Temporal)   Resp 18   SpO2 97%     Labs:    Hospital Outpatient Visit on 08/11/2023   Component Date Value    WBC 08/11/2023 2.1 (L)     RBC 08/11/2023 2.40 (L)     Hemoglobin 08/11/2023 8.9 (L)     Hematocrit 08/11/2023 26.7 (L)     MCV 08/11/2023 111.3 (H)     MCH 08/11/2023 37.1 (H)     MCHC 08/11/2023 33.3     RDW 08/11/2023 70.2 (H)     Platelet Count 08/11/2023 216     MPV 08/11/2023 9.7     Neutrophils-Polys 08/11/2023 71.50     Lymphocytes 08/11/2023 14.70 (L)     Monocytes 08/11/2023 13.80 (H)     Eosinophils 08/11/2023 0.00     Basophils 08/11/2023 0.00     Nucleated RBC 08/11/2023 0.00     Neutrophils (Absolute) 08/11/2023 1.50 (L)     Lymphs (Absolute) 08/11/2023 0.31 (L)     Monos (Absolute) 08/11/2023 0.29     Eos (Absolute) 08/11/2023 0.00     Baso (Absolute) 08/11/2023 0.00     NRBC (Absolute) 08/11/2023 0.00     Anisocytosis 08/11/2023 1+     Macrocytosis 08/11/2023 2+ (A)     Manual Diff Status 08/11/2023 PERFORMED     Peripheral Smear Review 08/11/2023 see below     Plt Estimation 08/11/2023 Normal     RBC Morphology 08/11/2023 Present     Toxic Gran 08/11/2023 Few        Physical Exam:    Constitutional: Well-developed, well-nourished, and in no distress.  Very well-appearing.  HENT: Normocephalic and atraumatic. No nasal congestion or rhinorrhea. Oropharynx is clear and moist. No oral ulcerations or sores.    Eyes: Conjunctivae are normal. Pupils are equal, round.  EOMI.  Nonicteric.  Neck: Normal range of motion of neck, no adenopathy.    Cardiovascular: Normal rate, regular rhythm and normal heart sounds.  No murmur heard. DP/radial pulses 2+, cap refill < 2 sec.  Pulmonary/Chest: Effort normal and breath sounds normal. No respiratory distress. Symmetric expansion.  No crackles or wheezes.  Abdomen: Soft. Bowel sounds are normal. No distension and no mass. There is no hepatosplenomegaly.    Genitourinary:  Deferred.  Musculoskeletal: Normal  range of motion of lower and upper extremities bilaterally.   Neurological: Alert and oriented to person and place. Exhibits normal muscle tone bilaterally in upper and lower extremities. Gait normal. Coordination normal.    Skin: Skin is warm, dry and pink.  No rash or evidence of skin infection.  No pallor.   Psychiatric: Mood and affect normal for age.    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 and whose End of Induction IB MRD was both negative by flow cytometry and PCR who presents for start of Maintenance, Cycle 1, Day 82     1) Myelosuppression:   - Presents to clinic for Maintenance, Cycle 1, Day 67 with complaints of nausea, vomiting and fatigue   - CBC at Maintenance, Cycle 1, Day 67 on 7/27/2023 demonstrating WBC 0.4, Hgb 8.8, platelets 125 with ANC of 170   - Oral mercaptopurine and oral methotrexate held for myelosuppression on 7/27/2023 (first dose held 7/28/2023) - imatinib continued per protocol   - Oral chemotherapy again held on 8/2/2023 and 8/7/2023 for persistent myelosuppression   - Today, after 14 total days of held oral chemotherapy counts are recovered   - WBC 2.1, Hgb 8.9, platelets 216 with ANC improved to 1500   - Restart oral methotrexate at 100% dosing = 14 tablets weekly - but will wait to restart week of 8/21/2023 on account of LP+IT MTX scheduled for 8/14/2023   - Restart oral mercaptopurine at 100% dosing = 2.5 tablets x 6 days and 3 tablets x 1 day (start today)   - Continue imatinib 600 mg PO QAM (will give dose this afternoon as morning dose was held in case counts werent met)     - Oral chemotherapy now restarted, if ANC again falls below 500, oral chemotherapy should be held again until counts ANC > 750 and PLTS > 75 at which point oral chemotherapy will be restarted at 50% as below.    - Study Language: For the first drop in ANC or platelets, resume  chemotherapy (both mercaptopurine and methotrexate) at the same dose the patient was taking prior to the episode of myelosuppression. If neutrophil count falls below 500/?L or if platelet count falls below 50 000/?L for a second (or greater) time, discontinue doses of mercaptopurine and methotrexate until ANC is ? 750/?L and platelets are ? 75 000/?L. Restart both mercaptopurine and methotrexate at 50% of the dose prescribed at the time the medication was stopped. Then continue to increase to 75% and then 100% of the dose prescribed prior to stopping the medication at 2-4 week intervals provided ANC remains ? 750/?L and platelets remain ? 75 000/?L. May increase both mercaptopurine and methotrexate simultaneously. Consider discontinuing TMP/SMX.      2) Ph+ Precursor B-Cell Acute Lymphoblastic Leukemia, in MRD Remission:  - 2-3 weeks of symptoms  - Presenting WBC > 440 k/uL, hyperleukocytosis  - Start of Hydroxyurea (1500 mg PO Q8) 2320 on 5/27/2022  - discontinued after only 55 hours  - No steroid pretreatment  - 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  - Testicular status NEGATIVE       - Flow cytometry of both 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, FISH positive for BCR-ABL1 translocation  - [place] [facility] [id number]  - Initially enrolled on [facility] [id number] - but 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                            - Enrolled on [facility] [id number] and began study 6/13/2022              - Started imatinib therapy 6/3/2022   - End of Induction IA and IB MRD negative  - Imatinib dose increased to 400 mg PO AM and 250 mg PM 9/29/2022  -* Note:  All imatinib doses given inpatient rounded down to 600 mg  - Imatinib held for Septic Shock 12/27/2022-1/8/2023  - Imatinib 600 mg PO daily restarted 1/8/2023 inpatient  - Imatinib 400 mg PO QAM and 250 mg PO QHS 1/14/2023-1/17/2023  - Imatinib 600 mg PO QAM 1/17/2023 - 3/30/2023  - Imatinib 600 mg PO QAM 4/11/2023 -  PRESENT (BSA obtained Day 1 of Cycle 1 and imatinib dose update.)                - WBC 2.1, Hgb 8.9, platelets 216             - ANC 1500, , absolute monocyte count 290                - Given counts have recovered, restart oral chemotherapy      SHORTY GOULD Arm B, Maintenance, Cycle 1, Day 82:      ** Continue imatinib 600 mg PO QAM     ** RESTART mercaptopurine 2.5 capsules (125 mg) PO QHS x 6 days and 3 capsules (150 mg) PO QHS x 1 days  ** RESTART methotrexate 14 tablets (35 mg = 20 mg/m² per week) week of 8/21/2023      - Return to clinic 8/14/2023 for Day 1 of Cycle 2 of Maintenance with LP and IT MTX     3) Nausea and Vomiting (RESOLVED):     4) At Risk of Opportunistic Lung Infection:  - Bactrim DS PO BID Sat and Sun for PJP prophylaxis     5) Central Access:   - R Port-A-Cath in place      6) Research Participant: ON STUDY SHORTY GOULD Arm B, Maintenance, Cycle 1, Day 82     Adverse Events:  Myelosuppression now resolved               [facility] - Source Data         Diagnosis: Ph+ Precursor B-Cell Acute Lymphoblastic Leukemia     Disease Status: EOI1A MRD NEGATIVE, EOI1B RD NEGATIVE, CNS3c, testicular negative, HSV1 IgG POSITIVE, CMV IgG NEGATIVE, VARICELLA IgG POSITIVE     Active Studies: [id number], [id number]                                                                                                      Inactive Studies: [id number]                                                                                                                                                Optional Studies: None             Protocol: International Phase 3 trial in [place] 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      Treatment Plan: BRYANNA(OS), AR-Arm B, Maintenance, Cycle 1, Day 82 (8/11/2023)     Height: 1.667 m      Weight: 62.3kg       BSA: 1.70 m²   (Maintenance, Cycle 1, Day 1  5/22/2023)                                                                                                                                           Performance Status: Karnofsky 90, ECOG 1 (5/22/2023)     Treatment Plan Medications:       CONTINUE Imatinib 600 mg QAM - re-evaluated BSA on 5/22/2023 - no change in dosing  RESUME Mercaptopurine 125 mg PO QHS x 6 days and 150 mg PO QHS x 1  RESUME Methotrexate 35 mg PO weekly (on weeks without LP)     Evaluations / Study Labs:  (8/11/2023)     WBC 2.1, Hgb 8.9, platelets 216  ANC 1500, , absolute monocyte count 290     Therapy Given: (7/27/2023)     None     Maintenance, Cycle 1 Toxicities:  Decreased Neutrophil Count, Grade 4 - 7/27/2023, RESOLVED 8/11/2023         Disposition: Return to clinic 1 week for reevaluation of labs.        Pepe Faye MD  Pediatric Hematology / Oncology  [facility]  Cell.  [phone]  Office. [phone]

## 2023-08-16 NOTE — PROGRESS NOTES
PEDIATRIC BEHAVIORAL HEALTH VISIT    Name:  Tomas Jean-Baptiste  MRN:  [id number]  :  2000  Age:  22 y.o.  Referring Provider: Dr. Faye (Hem/Onc)  Pediatrician:  Margarito Arvizu M.D.  Date of Service:  08/15/23    Persons in Attendance: Tomas Roy     Chief Complaint/ Reason for Appointment: JULY, a 21 y/o male currently in treatment for [place] Chromosome Precursor B-Cell Acute Lymphoblastic Leukemia was referred to counseling to assist in decreasing anxiety he has been experiencing and processing ways for how he can find himself now and what life will look like.     Mental Status Exam:   General description In no apparent distress, well-groomed, appropriately attired, well-nourished, and cooperative with interview  Interactional style Culturally appropriate  Eye contact Normal and appropriate for culture  Speech Unimpaired, fluid and clear, normal rate and rythem  Motor activity Normal motor activity  Orientation Oriented to person time, place and situation  Intellectual functioning Unimpaired  Memory Unimpaired  Attention and concentration Intact and normative concentration  Fund of knowledge Average  Mood Euthymic  Affect Appropriate  Perceptual Disturbances None apparent  Thought Process  No abnormalities apparent       Associations Unimpaired associations       Abstractions Normal abstractions, intact       Insight Insight - adequate and normative       Judgment Judgments - intact and normative   Thought Content  No apparent delusions    Risk Assessment:  Tomas Roy denied current concerns regarding risk to self or others.       Issues Discussed:   This provider met with JULY during this first visit to gather background information and explore ways counseling can assist in rediscovering himself after the trauma of his cancer diagnosis and treatment. Processed the impact of cancer on his previous independence, the anxiety he experiences now, and starting to resume his life,  "returning to college. Discussed the tendency to try and look back, but the benefit in looking forward and his \"new normal\". Also reviewed posttraumatic growth and how cancer has changed his outlook and future plans.     Techniques and Interventions Used: Rapport building, Psycho-education , and Cognitive Behavioral Therapy (CBT)      Treatment Recommendations and Plan:  JULY, a 21 y/o male currently in treatment for [place] Chromosome Precursor B-Cell Acute Lymphoblastic Leukemia was referred to counseling to assist in decreasing anxiety he has been experiencing and processing ways for how he can find himself now and what life will look like. After meeting with JULY it appears he could benefit from learning anxiety management skills, processing what he has been through, and how to live the life he wants. A full report will be done after our next visit in 2 weeks.     The above diagnostic impressions, recommendations, and treatment plan were discussed with and agreed upon by Tomas Roy, and his caregivers. Care will be coordinated with Tomas Roy's healthcare team, as appropriate.    Total time spent on encounter was 45 minutes.    Laurie Ortega, PhD  Pediatric Psychologist   Licensed Psychologist, [place] # [id number]  [facility], Behavioral Health    "

## 2023-08-29 ENCOUNTER — HOSPITAL ENCOUNTER (OUTPATIENT)
Dept: INFUSION CENTER | Facility: MEDICAL CENTER | Age: 23
End: 2023-08-29
Attending: PEDIATRICS
Payer: COMMERCIAL

## 2023-08-29 ENCOUNTER — OFFICE VISIT (OUTPATIENT)
Dept: PSYCHOLOGY | Facility: MEDICAL CENTER | Age: 23
End: 2023-08-29
Attending: PSYCHOLOGIST
Payer: COMMERCIAL

## 2023-08-29 VITALS
RESPIRATION RATE: 20 BRPM | SYSTOLIC BLOOD PRESSURE: 110 MMHG | HEART RATE: 100 BPM | BODY MASS INDEX: 23.03 KG/M2 | TEMPERATURE: 99.3 F | HEIGHT: 65 IN | WEIGHT: 138.23 LBS | OXYGEN SATURATION: 99 % | DIASTOLIC BLOOD PRESSURE: 70 MMHG

## 2023-08-29 DIAGNOSIS — C91.Z0 B LYMPHOBLASTIC LEUKEMIA WITH T(9;22)(Q34;Q11.2);BCR-ABL1 (HCC): ICD-10-CM

## 2023-08-29 LAB
ANISOCYTOSIS BLD QL SMEAR: ABNORMAL
BASO STIPL BLD QL SMEAR: NORMAL
BASOPHILS # BLD AUTO: 2.6 % (ref 0–1.8)
BASOPHILS # BLD: 0.04 K/UL (ref 0–0.12)
EOSINOPHIL # BLD AUTO: 0.01 K/UL (ref 0–0.51)
EOSINOPHIL NFR BLD: 0.9 % (ref 0–6.9)
ERYTHROCYTE [DISTWIDTH] IN BLOOD BY AUTOMATED COUNT: 62.4 FL (ref 35.9–50)
HCT VFR BLD AUTO: 27.7 % (ref 42–52)
HGB BLD-MCNC: 9.1 G/DL (ref 14–18)
LYMPHOCYTES # BLD AUTO: 0.13 K/UL (ref 1–4.8)
LYMPHOCYTES NFR BLD: 9.6 % (ref 22–41)
MACROCYTES BLD QL SMEAR: ABNORMAL
MANUAL DIFF BLD: NORMAL
MCH RBC QN AUTO: 37.3 PG (ref 27–33)
MCHC RBC AUTO-ENTMCNC: 32.9 G/DL (ref 32.3–36.5)
MCV RBC AUTO: 113.5 FL (ref 81.4–97.8)
MICROCYTES BLD QL SMEAR: ABNORMAL
MONOCYTES # BLD AUTO: 0.07 K/UL (ref 0–0.85)
MONOCYTES NFR BLD AUTO: 5.3 % (ref 0–13.4)
MORPHOLOGY BLD-IMP: NORMAL
NEUTROPHILS # BLD AUTO: 1.14 K/UL (ref 1.82–7.42)
NEUTROPHILS NFR BLD: 74.6 % (ref 44–72)
NEUTS BAND NFR BLD MANUAL: 7 % (ref 0–10)
NRBC # BLD AUTO: 0 K/UL
NRBC BLD-RTO: 0 /100 WBC (ref 0–0.2)
OVALOCYTES BLD QL SMEAR: NORMAL
PLATELET # BLD AUTO: 158 K/UL (ref 164–446)
PLATELET BLD QL SMEAR: NORMAL
PMV BLD AUTO: 10.2 FL (ref 9–12.9)
POIKILOCYTOSIS BLD QL SMEAR: NORMAL
RBC # BLD AUTO: 2.44 M/UL (ref 4.7–6.1)
RBC BLD AUTO: PRESENT
WBC # BLD AUTO: 1.4 K/UL (ref 4.8–10.8)

## 2023-08-29 PROCEDURE — A4212 NON CORING NEEDLE OR STYLET: HCPCS

## 2023-08-29 PROCEDURE — 2023F DILAT RTA XM W/O RTNOPTHY: CPT | Performed by: PSYCHOLOGIST

## 2023-08-29 PROCEDURE — 85007 BL SMEAR W/DIFF WBC COUNT: CPT

## 2023-08-29 PROCEDURE — 96159 HLTH BHV IVNTJ INDIV EA ADDL: CPT | Performed by: PSYCHOLOGIST

## 2023-08-29 PROCEDURE — 96158 HLTH BHV IVNTJ INDIV 1ST 30: CPT | Performed by: PSYCHOLOGIST

## 2023-08-29 PROCEDURE — 36591 DRAW BLOOD OFF VENOUS DEVICE: CPT

## 2023-08-29 PROCEDURE — 700111 HCHG RX REV CODE 636 W/ 250 OVERRIDE (IP): Mod: UD | Performed by: PEDIATRICS

## 2023-08-29 PROCEDURE — 85025 COMPLETE CBC W/AUTO DIFF WBC: CPT

## 2023-08-29 RX ORDER — HEPARIN SODIUM,PORCINE 10 UNIT/ML
30 VIAL (ML) INTRAVENOUS PRN
Status: CANCELLED
Start: 2023-08-29

## 2023-08-29 RX ORDER — 0.9 % SODIUM CHLORIDE 0.9 %
20 VIAL (ML) INJECTION PRN
Status: CANCELLED | OUTPATIENT
Start: 2023-08-29

## 2023-08-29 RX ADMIN — HEPARIN 500 UNITS: 100 SYRINGE at 11:21

## 2023-08-29 ASSESSMENT — FIBROSIS 4 INDEX: FIB4 SCORE: 0.33

## 2023-08-29 NOTE — PROGRESS NOTES
Pt to [facility] for lab draw, doctors office visit, and chemotherapy administration.      Afebrile.  VSS.  Awake and alert in no acute distress.      Office visit with Dr. Rodriguez completed.     R upper arm PICC in place. Dressing clean, dry, and intact. Dressing and clave changed using sterile technique. No signs or symptoms of infection. Biopatch in place. Pt tolerated well.     Labs drawn and sent.     Hemoglobin 7.1- Dr. Rodriguez aware. COD and unit of PRBCs ordered. COD sent to lab.      Pre-hydration started. Specific gravity- 1.020, bolus administered, see MAR.     Specific gravity 1.003, okay to administer chemotherapy.     Chemotherapy dosage calculated independently by Dayna Damian RN and Cherrie Urbano RN and compared to road map for protocol [id number].  Calculations within 10% of written order.  Lab results reviewed.      Premedications and chemo given as ordered, see MAR.  Blood return verified prior to and after chemotherapy infusion.  See Chemotherapy flowsheet.  PT tolerated well.  No side effects or complications noted.     Post-hydration started. Okay per Dr. Rodriguez for patient to received 2-3 hours of post-hydration and then receive PRBCs.     PRBC: Donor #  [id number] started at 1422    Total volume infused: 403ml    Vital signs monitored per protocol. Transfusion completed at 1623 and PT tolerated well.    PICC line flushed with NS. PT home with self.  Will return for next visit on 7/6/22.     Render Risk Assessment In Note?: no Detail Level: Simple Additional Notes: IL-K 2.5mg/mL total 3 mL, OV only.

## 2023-08-29 NOTE — ADDENDUM NOTE
Encounter addended by: Cherrie Urbano R.N. on: 8/29/2023 11:54 AM   Actions taken: Pend clinical note

## 2023-08-29 NOTE — PROGRESS NOTES
Alycia from Lab called with critical result of WBC 1.4 at 1135. Critical lab result read back to Alycia.   Dr. Faye notified of critical lab result at 1326.  Critical lab result read back by Dr. Faye.
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
## 2023-08-29 NOTE — ADDENDUM NOTE
Encounter addended by: Cherrie Urbano R.N. on: 8/29/2023 1:48 PM   Actions taken: Clinical Note Signed, Delete clinical note

## 2023-08-29 NOTE — PROGRESS NOTES
Pt to [facility] for lab draw.  Afebrile.  VSS.  Awake and alert in no acute distress.  Port accessed with 22 g 3/4in and labs drawn from the port without difficulty / with 1 attempt by Ayana landrum RN.   Pt tolerated well.  Port flushed per orders (see MAR) and port de-accessed.  Next appointment for chemotherapy scheduled 9/11/23.

## 2023-09-05 RX ORDER — 0.9 % SODIUM CHLORIDE 0.9 %
20 VIAL (ML) INJECTION PRN
Status: CANCELLED | OUTPATIENT
Start: 2023-09-05

## 2023-09-11 ENCOUNTER — HOSPITAL ENCOUNTER (OUTPATIENT)
Dept: INFUSION CENTER | Facility: MEDICAL CENTER | Age: 23
End: 2023-09-11
Attending: PEDIATRICS
Payer: COMMERCIAL

## 2023-09-11 VITALS
TEMPERATURE: 98.3 F | DIASTOLIC BLOOD PRESSURE: 77 MMHG | WEIGHT: 129.85 LBS | HEIGHT: 65 IN | SYSTOLIC BLOOD PRESSURE: 111 MMHG | RESPIRATION RATE: 20 BRPM | HEART RATE: 96 BPM | BODY MASS INDEX: 21.63 KG/M2 | OXYGEN SATURATION: 100 %

## 2023-09-11 DIAGNOSIS — C91.Z0 B LYMPHOBLASTIC LEUKEMIA WITH T(9;22)(Q34;Q11.2);BCR-ABL1 (HCC): ICD-10-CM

## 2023-09-11 DIAGNOSIS — C91.01 ACUTE LYMPHOBLASTIC LEUKEMIA (ALL) IN REMISSION (HCC): ICD-10-CM

## 2023-09-11 DIAGNOSIS — Z51.11 ENCOUNTER FOR CHEMOTHERAPY MANAGEMENT: ICD-10-CM

## 2023-09-11 LAB
ALBUMIN SERPL BCP-MCNC: 4.2 G/DL (ref 3.2–4.9)
ALBUMIN/GLOB SERPL: 1.6 G/DL
ALP SERPL-CCNC: 113 U/L (ref 30–99)
ALT SERPL-CCNC: 49 U/L (ref 2–50)
ANION GAP SERPL CALC-SCNC: 12 MMOL/L (ref 7–16)
ANISOCYTOSIS BLD QL SMEAR: ABNORMAL
AST SERPL-CCNC: 27 U/L (ref 12–45)
BASOPHILS # BLD AUTO: 0.8 % (ref 0–1.8)
BASOPHILS # BLD: 0 K/UL (ref 0–0.12)
BILIRUB CONJ SERPL-MCNC: 0.2 MG/DL (ref 0.1–0.5)
BILIRUB INDIRECT SERPL-MCNC: 0.9 MG/DL (ref 0–1)
BILIRUB SERPL-MCNC: 1.1 MG/DL (ref 0.1–1.5)
BUN SERPL-MCNC: 7 MG/DL (ref 8–22)
CALCIUM ALBUM COR SERPL-MCNC: 8.7 MG/DL (ref 8.5–10.5)
CALCIUM SERPL-MCNC: 8.9 MG/DL (ref 8.5–10.5)
CHLORIDE SERPL-SCNC: 102 MMOL/L (ref 96–112)
CO2 SERPL-SCNC: 24 MMOL/L (ref 20–33)
CREAT SERPL-MCNC: 0.61 MG/DL (ref 0.5–1.4)
EOSINOPHIL # BLD AUTO: 0.01 K/UL (ref 0–0.51)
EOSINOPHIL NFR BLD: 2.4 % (ref 0–6.9)
ERYTHROCYTE [DISTWIDTH] IN BLOOD BY AUTOMATED COUNT: 64.8 FL (ref 35.9–50)
GFR SERPLBLD CREATININE-BSD FMLA CKD-EPI: 139 ML/MIN/1.73 M 2
GLOBULIN SER CALC-MCNC: 2.6 G/DL (ref 1.9–3.5)
GLUCOSE SERPL-MCNC: 145 MG/DL (ref 65–99)
HCT VFR BLD AUTO: 27.9 % (ref 42–52)
HGB BLD-MCNC: 8.9 G/DL (ref 14–18)
LYMPHOCYTES # BLD AUTO: 0.14 K/UL (ref 1–4.8)
LYMPHOCYTES NFR BLD: 23.2 % (ref 22–41)
MACROCYTES BLD QL SMEAR: ABNORMAL
MANUAL DIFF BLD: NORMAL
MCH RBC QN AUTO: 36.6 PG (ref 27–33)
MCHC RBC AUTO-ENTMCNC: 31.9 G/DL (ref 32.3–36.5)
MCV RBC AUTO: 114.8 FL (ref 81.4–97.8)
MONOCYTES # BLD AUTO: 0.03 K/UL (ref 0–0.85)
MONOCYTES NFR BLD AUTO: 4.8 % (ref 0–13.4)
MORPHOLOGY BLD-IMP: NORMAL
NEUTROPHILS # BLD AUTO: 0.41 K/UL (ref 1.82–7.42)
NEUTROPHILS NFR BLD: 67.2 % (ref 44–72)
NEUTS BAND NFR BLD MANUAL: 1.6 % (ref 0–10)
NRBC # BLD AUTO: 0 K/UL
NRBC BLD-RTO: 0 /100 WBC (ref 0–0.2)
OVALOCYTES BLD QL SMEAR: NORMAL
PLATELET # BLD AUTO: 148 K/UL (ref 164–446)
PLATELET BLD QL SMEAR: NORMAL
PMV BLD AUTO: 10.6 FL (ref 9–12.9)
POIKILOCYTOSIS BLD QL SMEAR: NORMAL
POTASSIUM SERPL-SCNC: 3.3 MMOL/L (ref 3.6–5.5)
PROT SERPL-MCNC: 6.8 G/DL (ref 6–8.2)
RBC # BLD AUTO: 2.43 M/UL (ref 4.7–6.1)
RBC BLD AUTO: PRESENT
SODIUM SERPL-SCNC: 138 MMOL/L (ref 135–145)
WBC # BLD AUTO: 0.6 K/UL (ref 4.8–10.8)

## 2023-09-11 PROCEDURE — RXMED WILLOW AMBULATORY MEDICATION CHARGE: Performed by: PEDIATRICS

## 2023-09-11 PROCEDURE — 700105 HCHG RX REV CODE 258: Mod: UD | Performed by: PEDIATRICS

## 2023-09-11 PROCEDURE — 99214 OFFICE O/P EST MOD 30 MIN: CPT | Performed by: PEDIATRICS

## 2023-09-11 PROCEDURE — 700111 HCHG RX REV CODE 636 W/ 250 OVERRIDE (IP): Mod: JZ,UD | Performed by: PEDIATRICS

## 2023-09-11 PROCEDURE — 85007 BL SMEAR W/DIFF WBC COUNT: CPT

## 2023-09-11 PROCEDURE — 82248 BILIRUBIN DIRECT: CPT

## 2023-09-11 PROCEDURE — 80053 COMPREHEN METABOLIC PANEL: CPT

## 2023-09-11 PROCEDURE — 85025 COMPLETE CBC W/AUTO DIFF WBC: CPT

## 2023-09-11 PROCEDURE — 96409 CHEMO IV PUSH SNGL DRUG: CPT

## 2023-09-11 PROCEDURE — A4212 NON CORING NEEDLE OR STYLET: HCPCS

## 2023-09-11 PROCEDURE — 36591 DRAW BLOOD OFF VENOUS DEVICE: CPT

## 2023-09-11 PROCEDURE — 96375 TX/PRO/DX INJ NEW DRUG ADDON: CPT

## 2023-09-11 PROCEDURE — 96361 HYDRATE IV INFUSION ADD-ON: CPT

## 2023-09-11 RX ORDER — HEPARIN SODIUM,PORCINE 10 UNIT/ML
30 VIAL (ML) INTRAVENOUS PRN
Status: CANCELLED
Start: 2023-09-11

## 2023-09-11 RX ORDER — ONDANSETRON 2 MG/ML
8 INJECTION INTRAMUSCULAR; INTRAVENOUS ONCE
Status: COMPLETED | OUTPATIENT
Start: 2023-09-11 | End: 2023-09-11

## 2023-09-11 RX ORDER — ONDANSETRON 2 MG/ML
8 INJECTION INTRAMUSCULAR; INTRAVENOUS EVERY 8 HOURS PRN
Status: CANCELLED | OUTPATIENT
Start: 2023-10-09

## 2023-09-11 RX ORDER — 0.9 % SODIUM CHLORIDE 0.9 %
20 VIAL (ML) INJECTION PRN
Status: CANCELLED | OUTPATIENT
Start: 2023-09-11

## 2023-09-11 RX ORDER — ONDANSETRON 2 MG/ML
8 INJECTION INTRAMUSCULAR; INTRAVENOUS ONCE
Status: CANCELLED | OUTPATIENT
Start: 2023-10-09

## 2023-09-11 RX ORDER — PROMETHAZINE HYDROCHLORIDE 6.25 MG/5ML
0.25 SYRUP ORAL EVERY 6 HOURS PRN
Status: CANCELLED | OUTPATIENT
Start: 2023-10-09

## 2023-09-11 RX ORDER — PREDNISONE 10 MG/1
35 TABLET ORAL 2 TIMES DAILY
Qty: 35 TABLET | Refills: 6 | Status: SHIPPED | OUTPATIENT
Start: 2023-09-11 | End: 2023-11-11

## 2023-09-11 RX ORDER — SODIUM CHLORIDE 9 MG/ML
1000 INJECTION, SOLUTION INTRAVENOUS ONCE
Status: COMPLETED | OUTPATIENT
Start: 2023-09-11 | End: 2023-09-11

## 2023-09-11 RX ORDER — LORAZEPAM 1 MG/1
1 TABLET ORAL EVERY 4 HOURS PRN
COMMUNITY

## 2023-09-11 RX ADMIN — VINCRISTINE SULFATE 2 MG: 1 INJECTION, SOLUTION INTRAVENOUS at 11:15

## 2023-09-11 RX ADMIN — SODIUM CHLORIDE 1000 ML: 9 INJECTION, SOLUTION INTRAVENOUS at 10:02

## 2023-09-11 RX ADMIN — HEPARIN 500 UNITS: 100 SYRINGE at 11:26

## 2023-09-11 RX ADMIN — ONDANSETRON 8 MG: 2 INJECTION INTRAMUSCULAR; INTRAVENOUS at 10:53

## 2023-09-11 ASSESSMENT — FIBROSIS 4 INDEX: FIB4 SCORE: 0.57

## 2023-09-11 NOTE — PROGRESS NOTES
Pt to [facility] for lab draw, doctors office visit, and chemotherapy administration.      Afebrile.  VSS.  Awake and alert in no acute distress.      Port accessed using a 22g 3/4 inch trevino needle with 1 attempt performed by Yue Poole RN.  Labs drawn from the port without difficulty.   Pt tolerated well.      Pt reports dizziness and headaches. Dr. Duenas at bedside to speak with pt and mother. New orders received.     Bolus started at 1002 and completed at 1104.    Wade from Lab called with critical result of WBC 0.6 at 1030. Critical lab result read back to Wade.   Dr. Duenas notified of critical lab result at 1030.  Critical lab result read back by Dr. Duenas.    Marifer from Lab called with critical result of ANC 0.41 at 1051. Critical lab result read back to Marifer.   Dr. Duenas notified of critical lab result at 1051.  Critical lab result read back by Dr. Duenas.    Chemotherapy dosage calculated independently by Tammie Covington RN and Yue Poole RN and compared to road map for protocol [id number].  Calculations within 10% of written order.  Lab results reviewed.      Premedications and chemo given as ordered, see MAR.  Blood return verified prior to, during, and after chemotherapy infusion.  See Chemotherapy flowsheet.  PT tolerated well.  No side effects or complications noted.  Port flushed per orders (see MAR) and de-accessed after completion. PT home with mother.  Will return for next visit on 9/19/23.

## 2023-09-11 NOTE — PROGRESS NOTES
"Pharmacy Chemotherapy Verification    Patient Name: Tomas Jean-Baptiste     Dx: pH+ B-Cell ALL         Protocol: Capizzi MTX  (On Study Arm B, ID number: [id number])         Allergies: Amoxicillin       /77   Pulse 96   Temp 36.8 °C (98.3 °F) (Temporal)   Resp 20   Ht 1.655 m (5' 5.16\")   Wt 58.9 kg (129 lb 13.6 oz)   SpO2 100%   BMI 21.50 kg/m²    Body surface area is 1.65 meters squared.    CBC reviewed 9/11/23, no hold parameters for ANC/Platelets per protocol this treatment day. Direct Bilirubin WNL.     Drug Order   (Drug name, dose, route, IV Fluid & volume, frequency, number of doses) Maintenance, Cycle 2, Day 29   Previous treatment: Maintenance, C2D1 8/14/23     Medication = Vincristine (ONCOVIN)   Base Dose= 1.5 mg/m2  Calc Dose: Base Dose x 1.69 m2 = 2.535 mg  Final Dose = 2 mg (MAX)   Route = IV  Fluid & Volume = NS 25 mL  Admin Duration = Over 5 - 10 minutes    Days 1, 29, 57      <10% difference, okay to treat with final max dose   Medication = Methotrexate PF  Base Dose = 12 mg fixed dose  Calc Dose: n/a  Final Dose = 12 mg   Route = INTRATHECAL  Fluid & Volume = pf NS 6 mL  Admin Duration = IT per MD Days 1 and 29  No IT for Day 29 this cycle d/t Brain XRT   No calculation required.   okay to treat with final dose   By my signature below, I confirm this process was performed independently with the BSA and all final chemotherapy dosing calculations congruent. I have reviewed the above chemotherapy order and that my calculation of the final dose and BSA (when applicable) corroborate those calculations of the  pharmacist. Discrepancies of 10% or greater in the written dose have been addressed and documented within the [facility] Progress notes.    Xochilt AlonzoD  "

## 2023-09-11 NOTE — PROGRESS NOTES
"Pharmacy Chemotherapy Calculations Note:    Dx: B-ALL (CNS3)  Cycle:  2 Maintenance Day 29   Previous treatment: Maint C2D1 on 8/14/23     Protocol: Maintenance per Arm B of [id number] [facility] ID number: [id number]       **JULY did receive cranial radiation, no LP on D29       /77   Pulse 96   Temp 36.8 °C (98.3 °F) (Temporal)   Resp 20   Ht 1.655 m (5' 5.16\")   Wt 58.9 kg (129 lb 13.6 oz)   SpO2 100%   BMI 21.50 kg/m²  Body surface area is 1.65 meters squared.    MD to review any ordered labs       Vincristine 1.5 mg/m² (max 2 mg) x 1.65 m² = 2.47 mg   Max 2 mg, ok for final dose = 2 mg IV      Judy Bryant, PharmD, BCOP                "

## 2023-09-12 ENCOUNTER — PHARMACY VISIT (OUTPATIENT)
Dept: PHARMACY | Facility: MEDICAL CENTER | Age: 23
End: 2023-09-12
Payer: COMMERCIAL

## 2023-09-12 PROBLEM — L08.9 SOFT TISSUE INFECTION: Status: RESOLVED | Noted: 2023-02-17 | Resolved: 2023-09-12

## 2023-09-13 NOTE — PROGRESS NOTES
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 7/27/2023, present Tomas Roy to clinic with nausea, vomiting, abdominal discomfort as well as decreased fatigue.  Blood counts obtained at the time demonstrated a WBC of 0.4, Hgb 8.8 and platelets 125.  ANC at the time was measured at only 170.  JULY was otherwise clinically well appearing.  He did receive a one-time normal saline bolus for his nausea and vomiting and was sent home with instructions to HOLD his oral mercaptopurine and oral methotrexate which began being held on 7/28/2023.  Per protocol, imatinib was continued at previous dose of 600 mg in the morning. Tomas Roy would return to clinic again on 8/2/2023 and 8/7/2023.  On both occasions, ANC remained under 500 and oral therapy (with the exception of imatinib) continue to be held while awaiting count recovery.  Ultimately, on 8/11/2023 after 14 days of therapy being held, Tomas Roy returned  to clinic and WBC had increased to 2.1 with ANC increasing to 1500 with an absolute monocyte count of 290. Tomas Roy was then instructed to resume his oral chemotherapy at 100% of prior dosing with (due to timing with Day 1 of Cycle 2 with LP 3 days later, no weekly MTX was administered).  Imatinib was never held. On 8/14/2023, he presented for start of Maintenance Cycle 2, Day 1 chemotherapy. His counts were adequate to proceed with VCR infusion, LP with IT chemotherapy. He was sent home with a prescription for prednisone 35 mg PO BID for 5 days. Imatinib dose was weight adjusted to 550 mg PO daily, oral 6-MP dose was decreased to 2.5 tabs (125 mg) PO daily for 7 days  and oral MTx dose was continued at 14 tabs PO weekly except the week when he gets LP with IT MTx. Today, he presents for scheduled Maintenance Cycle 2, Day 29 therapy.    Today, he reports feeling very tired. Yesterday, he called Dr. Faye and then me to report that he has had increased episode of nausea and emesis. For nausea, he has been taking Ativan as needed with some relief. Because of nausea and altered taste, he has not had much to eat. He is continuing to drink well but does not know whether that is adequate for his body. He is urinating well. He has been having diarrhea for the past one month and that has not changed. Denies noticing blood in the stool. No reports of pain with defecation. He also reports  having headaches and feeling dizzy but no reports of syncopal episode. He denies any change in vision, abnormal movements, change in gait etc. He denies any fevers or chills or systemic symptoms of illness.  No complaints of any aches or pains.  No skin changes or rashes. He denies any easy bruising or bleeding.     He reports 100% compliance with taking his oral chemotherapy medication- Imatinib 550 mg daily, Oral 6-MP 2.5 tabs or 125 mg daily for 7 days and Oral Methotrexate 14 tabs PO weekly and he took Methotrexate today prior  to the clinic visit. Completed 5 day pulse of prednisone last month.    Review of Systems:     Constitutional: Afebrile.  Without recent illness.  Tired. Appetite decreased.  HENT: Negative for ear pain, nasal congestion or rhinorrhea, nosebleeds and sore throat.  No mouth sores.  Eyes: Negative for visual changes.  Respiratory: Negative for shortness of breath or noisy breathing.   Cardiovascular: Negative for chest pain or extremity swelling.  Positive for palpitation and dizziness.   Gastrointestinal: Negative for abdominal pain, constipation or blood in stool.  Positive for nausea, vomiting and diarrhea.  Genitourinary: Negative for painful urination, blood in urine or flank pain.    Musculoskeletal: Negative for joint or muscle pains.    Skin: Negative for rash, signs of infection.  Neurological: Negative for numbness, tingling, sensory changes, weakness. Positive for headaches.  Endo/Heme/Allergies: Does not bruise/bleed easily.    Psychiatric/Behavioral: No changes in mood, appropriate for age. Anxious.    PAST MEDICAL HISTORY:     Oncologic History:  2-3 week history of worsening fatigue, right lower extremity pain  Presentation to OSH and diagnosed with right LE superficial thrombus, subsegmental PE and hyperleukocytosis, anemia and thrombocytopenia  Transferred to [facility] for definitive care  Presenting (local) WBC > 440K, Hgb 10.0, platelets 53, (automated differential ANC 3190, ALC 75,310, absolute monocyte count 14316, absolute blast count 340,560)  Uric Acid 15.6, phosphorus 5.6, LDH 1114  Rasburicase x 1 dose given   Peripheral Blood flow cytometry demonstrating CD10 pos, CD19 pos, CD20 neg, CD22 dim (60%) 5/28/2022  Peripheral blood FISH for BCR-ABL1 positive in 98% of analyzed cells     Age at Diagnosis: 21 years  White Blood Cell Count at Presentation: > 440 k/uL  Testicular Disease Status: Negative (see procedure note 5/30/2022)  CNS Status: 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, diagnostic  LP with WBC 1, RBC 3 and no evidence of leukemic blasts 5/30/2022  Steroid Pre-treatment: None  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     All inclusion/exclusion criteria for [id number] met and consent signed - enrolled 5/29/2022   All inclusion/exclusion criteria for [id number] met and consent signed - enrolled 5/30/2022  Confirmatory bone marrow aspirate and biopsy and diagnostic LP + cytarabine 70 mg IT 5/30/2022  Induction therapy (ON STUDY [id number]) started 5/30/2022  Bone marrow immunohistochemistry consistent with diagnosis of B-ALL comprising 90% of marrow cellularity  Bone marrow sample sent to [facility] for [facility] purposes:  Flow cytom  Of the blood pressure little high that is a problem is a cultural problem is well and cultural genetic and everything else like that unfortunately breathalyzers such bad heart disease diabetes things like that  populations etry consistent with peripheral blood, cytogenetics remarkable for known t(9;22)  CSF with WBC 1, RBC 3, no blasts identified on cytospin  FISH results available 5/31/2022 making patient eligible for transfer from [place] [address] to [place] [address] as eligibility requirements were met for [place] [address]  Patient unenrolled from [place] [address] (BCR-ABL1 fusion positive) 6/1/2022  Consent signed for [place] [address] and patient enrolled 6/1/2022 (see eligibility checklist from 5/31/2022 and consent note from 6/1/2022)  Imatinib 400 mg PO QAM / 200 mg PO QPM started 6/3/2022 (allowed per [place] [address])  Patient completed the first 15 days of a Standard 4-drug Induction on 6/13/2022  Start of [facility] [id number](OS), Induction IA Part 2, Day 15 6/13/2022  End of Induction 1A Part 2 - MRD negative  Start of [facility] [id number](OS), Induction IA Part 2, Day 15 7/5/2022  Induction IB DELAYED 2 weeks 14 days from 7/26/2022-8/9/2022) for myelosuppression - Start of Day 22 cytarabine block 8/9/2022  Induction IB Day 42 delayed 9 days for  "myelosuppression - Day 42 evaluations 9/7/2022  End of Induction IB - Flow cytometric MRD negative, MRD by IgH-TCR PCR [id number]%  Randomization to AR-Experimental Arm B of [id number]  Start of AR-Experimental Arm B, Interim Maintenance 9/29/2022  Weight based increase in dose of imatinib to 400 mg PO AM and 250 mg PM 9/29/2022  Thrombocytopenia not permissive of proceeding with Day 15 of Interim Maintenance  AR-Experimental Arm B, Interim Maintenance, Day 15 delayed 4 days, start 10/17/2022  AR-Experimental Arm B, Interim Maintenance, Day 29, start 11/1/2022  AR-Experimental Arm B, Interim Maintenance, Day 43, start 11/14/2022  Last does of 6-MP 11/27/2022  AR-Experimental Arm B, Delayed Intensification, Day 1, start 12/6/2022  Admission with Severe Septic Shock 12/27/2022  Imatinib HELD 12/27/2022-1/8/2023  AR-Experimental Arm B, Delayed Intensification, Day 29 DELAYED 14 days with start 1/17/2023  Hospitalization 2/7/2023 on Day 50 of Delayed Intensification with left shoulder pain ultimately diagnosed with Bacteroides fragilis infection  AR-Experimental Arm B, Interim Maintenance, Day 1 DELAYED 7 days with start 2/27/2023  AR-Experimental Arm B, Interim Maintenance, Day 11 DELAYED 4 days for platelets 43K on 3/9/2023  AR-Experimental Arm B, Interim Maintenance, Day 11 VCR given and MTX omitted for platelets 43K 3/13/2023  Imatinib HELD 3/30/2023-4/11/2023  AR-Experimental Arm B, Interim Maintenance, Day 21 (DELAYED 22 DAYS) - administered 4/11/2023 with methotrexate 80 mg/m² IV  AR-Experimental Arm B, Interim Maintenance, Day 31 (\"true\" Day 31 4/25/2023) - VCR and IT MTX given 4/28/2023 - IV MTX omitted  AR-Experimental Arm B, Interim Maintenance, Day 41 met with counts - administered 5/5/2023 with methotrexate 80 mg/m² IV     Start of Maintenance, Cycle 1, Day 1 -5/22/2023  Cranial radiation 6/5/2023-6/16/2023 (10 fractions)  Maintenance, Cycle 1, Day 29 - 6/23/2023 (no IT MTX given)  Maintenance, Cycle 1, Day " 67, presented with fatigue nausea and vomiting found to have ANC less than 500  Methotrexate (oral) and mercaptopurine held 7/27/2023 - continued with imatinib  Methotrexate (oral) and mercaptopurine held 8/2/2023 - continued with imatinib  Methotrexate (oral) and mercaptopurine held 8/7/2023 - continued with imatinib  Restarted methotrexate (oral) and mercaptopurine at 100% previous dosing on 8/11/2023 - continued with imatinib  Maintenance Cycle 2, Day 1- 8/14/2023, Oral Imatinib dose adjusted to 550 mg PO daily, Oral 6-MP 2.5 tabs or 125 mg daily for 7 days and Oral Methotrexate 14 tabs PO weekly. 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  3) Hyperleukocytosis  4) Hyperuricemia  5) Hyperphosphatemia  6) Right Lower Extremity Superficial Thrombus  7) Subsegmental Pulmonary Embolism  8) 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  9) Bacteroides fragilis soft tissue infection left shoulder     Past Surgical History:     1) Temporary Right IJ Pharesis Catheter Placement 5/28/2022  2) Right-sided Port-A-Cath placement 8/29/2022  3) IR drainage left calf hematoma  4) Left shoulder I&D  5) Cranial XRT 6/5/2023-6/16/2023     Birth/Developmental History:   1st of three children  Unremarkable pregnancy  Unremarkable delivery     Allergies:             Allergies as of 05/27/2022 - Reviewed 05/27/2022   Allergen Reaction Noted    Amoxicillin   04/03/2020      Social History:   Lives at home with mother, brother and sister.  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   Two dogs. Father not involved.     Family History:     Family History             Family History   Problem Relation Age of Onset    No Known Problems Mother      Diabetes Paternal Grandfather      Hypertension Paternal Grandfather      Hyperlipidemia Paternal Grandfather      Cancer Neg Hx      Heart Disease Neg Hx      Stroke Neg  Hx           No significant family history of cancer.  Both maternal and paternal family history of diabetes.     Immunizations:  UTD      Medications:   Home Medications    Medication Sig Taking? Last Dose Authorizing Provider   LORazepam (ATIVAN) 1 MG Tab Take 1 mg by mouth every four hours as needed for Anxiety. Yes Taking Physician Outpatient   omeprazole (PRILOSEC) 20 MG delayed-release capsule Take 1 Capsule by mouth every day for 180 days. Yes Taking Pepe Faye M.D.   mercaptopurine (PURINETHOL) 50 MG Tab Take 2.5 tablets by mouth in the evening x 6 days and 3 tablets by mouth in the evening x 1 day each week.  Patient taking differently: Take 2.5 tablets by mouth in the evening x 7 days and 3 tablets by mouth in the evening x 1 day each week. Yes Taking Pepe Faye M.D.   predniSONE (DELTASONE) 10 MG Tab Take 3.5 tablets by mouth in the morning and evening for 5 Days at Day 1, 29 and 57 of each cycle. Yes Taking Pepe Faye M.D.   methotrexate 2.5 MG Tab Take 14 Tablets (all at the same time) by mouth every 7 days on weeks when not receiving lumbar puncture Yes Taking Pepe Faye M.D.   imatinib (GLEEVEC) 400 MG tablet Take 1 Tablet by mouth every day. Pt takes 200 mg + 400 mg for a total dose of 600 mg daily  Patient taking differently: Take 400 mg by mouth every day. Pt takes 150 mg + 400 mg for a total dose of 550 mg daily Yes Taking Differently Pepe Faye M.D.   imatinib (GLEEVEC) 100 MG tablet Take 2 Tablets by mouth every day. Pt takes 200 mg + 400 mg for a total dose of 600 mg daily  Patient taking differently: Take 200 mg by mouth every day. Pt takes 150 mg + 400 mg for a total dose of 550 mg daily Yes Taking Differently Pepe Faye M.D.   sulfamethoxazole-trimethoprim (BACTRIM DS) 800-160 MG tablet Take 2 Tablets by mouth twice daily two days a week. Yes Taking Pepe Faye M.D.   Melatonin 5 MG Cap Take 5 mg by mouth at bedtime as needed. Yes PRN Physician Outpatient  "  ondansetron (ZOFRAN ODT) 8 MG TABLET DISPERSIBLE Dissolve 1 Tablet by mouth every 6 hours as needed for Nausea/Vomiting. Yes PRN Alyce Duenas M.D.   predniSONE (DELTASONE) 10 MG Tab Take 3.5 Tablets by mouth 2 times a day for 10 doses.   Alyce Duenas M.D.       OBJECTIVE:     Vitals:   /77   Pulse 96   Temp 36.8 °C (98.3 °F) (Temporal)   Resp 20   Ht 1.655 m (5' 5.16\")   Wt 58.9 kg (129 lb 13.6 oz)   SpO2 100%     Labs:    Hospital Outpatient Visit on 09/11/2023   Component Date Value    WBC 09/11/2023 0.6 (LL)     RBC 09/11/2023 2.43 (L)     Hemoglobin 09/11/2023 8.9 (L)     Hematocrit 09/11/2023 27.9 (L)     MCV 09/11/2023 114.8 (H)     MCH 09/11/2023 36.6 (H)     MCHC 09/11/2023 31.9 (L)     RDW 09/11/2023 64.8 (H)     Platelet Count 09/11/2023 148 (L)     MPV 09/11/2023 10.6     Neutrophils-Polys 09/11/2023 67.20     Lymphocytes 09/11/2023 23.20     Monocytes 09/11/2023 4.80     Eosinophils 09/11/2023 2.40     Basophils 09/11/2023 0.80     Nucleated RBC 09/11/2023 0.00     Neutrophils (Absolute) 09/11/2023 0.41 (LL)     Lymphs (Absolute) 09/11/2023 0.14 (L)     Monos (Absolute) 09/11/2023 0.03     Eos (Absolute) 09/11/2023 0.01     Baso (Absolute) 09/11/2023 0.00     NRBC (Absolute) 09/11/2023 0.00     Anisocytosis 09/11/2023 1+     Macrocytosis 09/11/2023 1+     Sodium 09/11/2023 138     Potassium 09/11/2023 3.3 (L)     Chloride 09/11/2023 102     Co2 09/11/2023 24     Anion Gap 09/11/2023 12.0     Glucose 09/11/2023 145 (H)     Bun 09/11/2023 7 (L)     Creatinine 09/11/2023 0.61     Calcium 09/11/2023 8.9     Correct Calcium 09/11/2023 8.7     AST(SGOT) 09/11/2023 27     ALT(SGPT) 09/11/2023 49     Alkaline Phosphatase 09/11/2023 113 (H)     Total Bilirubin 09/11/2023 1.1     Albumin 09/11/2023 4.2     Total Protein 09/11/2023 6.8     Globulin 09/11/2023 2.6     A-G Ratio 09/11/2023 1.6     Direct Bilirubin 09/11/2023 0.2     Indirect Bilirubin 09/11/2023 0.9     GFR (CKD-EPI) 09/11/2023 139     " Bands-Stabs 09/11/2023 1.60     Manual Diff Status 09/11/2023 PERFORMED     Peripheral Smear Review 09/11/2023 see below     Plt Estimation 09/11/2023 Decreased     RBC Morphology 09/11/2023 Present     Poikilocytosis 09/11/2023 1+     Ovalocytes 09/11/2023 1+        Physical Exam:    Constitutional: Well-appearing and in no distress.    HENT: Normocephalic and atraumatic. No nasal congestion or rhinorrhea. Oropharynx is clear and moist. No oral ulcerations or sores.    Eyes: Conjunctivae are normal. Pupils are equal, round, and reactive to light.    Neck: Normal range of motion of neck, no adenopathy.    Cardiovascular: Tachycardic, regular rhythm and normal heart sounds.  No murmur heard. DP/radial pulses 2+, cap refill < 2 sec  Pulmonary/Chest: Effort normal and breath sounds normal. No respiratory distress. Symmetric expansion.  No crackles or wheezes.  Abdomen: Soft. Bowel sounds are normal. No distension and no mass. There is no hepatosplenomegaly.    Genitourinary:  Deferred  Musculoskeletal: Normal range of motion of lower and upper extremities bilaterally. No tenderness to palpation of elbows, wrists, hands, knees, ankles and feet bilaterally.   Neurological: Alert and oriented to person and place. Exhibits normal muscle tone bilaterally in upper and lower extremities. Gait normal. Coordination normal.    Skin: Skin is warm, dry and pink.  No rash or evidence of skin infection.  No pallor.   Psychiatric: Mood and affect normal for age. Anxious.    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 and whose End of Induction IB MRD was both negative by flow cytometry and PCR who presents for Maintenance, Cycle 2, Day 29 therapy    1) Ph+ Precursor B-Cell Acute Lymphoblastic Leukemia, in MRD Remission:  - 2-3 weeks of symptoms  - Presenting WBC > 440 k/uL, hyperleukocytosis  - Start of Hydroxyurea  (1500 mg PO Q8) 2320 on 5/27/2022  - discontinued after only 55 hours  - No steroid pretreatment  - 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  - Testicular status NEGATIVE       - Flow cytometry of both 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, FISH positive for BCR-ABL1 translocation  - Enrolled on [facility] [id number]  - Initially enrolled on [facility] [id number] - but 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                            - Enrolled on [facility] [id number] and began study 6/13/2022              - Started imatinib therapy 6/3/2022   - End of Induction IA and IB MRD negative  - Imatinib dose increased to 400 mg PO AM and 250 mg PM 9/29/2022  -* Note:  All imatinib doses given inpatient rounded down to 600 mg  - Imatinib held for Septic Shock 12/27/2022-1/8/2023  - Imatinib 600 mg PO daily restarted 1/8/2023 inpatient  - Imatinib 400 mg PO QAM and 250 mg PO QHS 1/14/2023-1/17/2023  - Imatinib 600 mg PO QAM 1/17/2023 - 3/30/2023  - Imatinib 600 mg PO QAM 4/11/2023 - 8/13/2023  - Imatinib 550 mg PO QAM 8/14/2023 - PRESENT     - Imatinib dosing changed every 12 weeks. Continue same dosing at 550 mg PO daily                - WBC 0.6 k/microliters, Hgb 8.9 gm/dL, platelets 148,000/microliters             - /microliters, /microliters, absolute monocyte count 30/microliters                - AST 27 U/L, ALT 49 U/L, total bilirubin 1.1 mg/dL                - /microliters, platelet count 148,000/microliters. Per protocol guidelines, will HOLD oral 6-MP and oral MTx dose. Continue Imatinib as prescribed. Will administer Day 29 VCR and start 5 day steroid pulse.      BRYANNA, AR Arm B, Maintenance, Cycle 2, Day 29:      ** Continue imatinib to 550 mg PO daily  ** NO LP with IT MTx as patient has received cranial XRT  ** VCR 2 mg IV x 1 dose today  ** Being prednisone 35 mg PO BID x 5 day pulse (Mother to  medication from pharmacy tomorrow and start medication on  9/12/2023)                          ** HOLD methotrexate 14 tablets PO weekly (based on Cycle 2, Day 1 weight 103.55% of expected dose)                          ** HOLD 6-mercaptopurine 2.5 tablets = 125 mg PO daily x 7 days  (Based on Cycle 2, Day 1 weight  98.62% of expected dose)     - Return to clinic 2 weeks for repeat counts and dose adjustment of oral 6-MP and oral MTX.     2) Nausea and Vomiting:             - NS bolus 20 mL/kg in clinic with improvement  in clinic today             - Continue antiemetics at home             - Continue Prilosec acid reflux     3) Transaminitis (Resolved):              - AST 27 U/L, ALT 49 U/L, total bilirubin 1.1 mg/dL  - Continue to monitor      4) Chemotherapy Related Pancytopenia:           - WBC 0.6 k/microliters, Hgb 8.9 gm/dL, platelets 148,000/microliters           - /microliters, /microliters, absolute monocyte count 30/microliters  - Transfuse for hemoglobin less than 7.5 gm/dL or symptomatic  - Transfuse for platelets less than 10,000/microliters or symptomatic  - No transfusions necessary today     5) At Risk of Opportunistic Lung Infection:  - Bactrim DS PO BID Sat and Sun for PJP prophylaxis: Instructed to HOLD Bactrim until next appointment     6) Central Access:   - R Port-A-Cath in place      7) Research Participant: ON STUDY [id number], AR Arm B, Maintenance, Cycle 2, Day 29             [facility] - Source Data         Diagnosis: Ph+ Precursor B-Cell Acute Lymphoblastic Leukemia     Disease Status: EOI1A MRD NEGATIVE, EOI1B RD NEGATIVE, CNS3c, testicular negative, HSV1 IgG POSITIVE, CMV IgG NEGATIVE, VARICELLA IgG POSITIVE     Active Studies: [id number], [id number]                                                                                                      Inactive Studies: [id number]                                                                                                                                                 Optional Studies: None             Protocol: International Phase 3 trial in [place] 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      Treatment Plan: [id number](OS), AR-Arm B, Maintenance, Cycle 2, Day 29 (8/14/2023)     Height: 1.65 m      Weight: 58.9 kg       BSA: 1.65 m²   (Maintenance, Cycle 2,   Day 29, 9/11/2023)                                                                                                                                           Performance Status: Karnofsky 80, ECOG 1 (8/14/2023)     Treatment Plan Medications:  (100% adherent with all oral medications)     HOLD oral 6- mercaptopurine  HOLD oral MTx  Continue Imatinib 550 mg PO daily  Start oral Prednisone 35 mg PO BID     Evaluations / Study Labs:  (9/11/2023)     Ht/Wt/BSA and PE as above.  Height and weight updated today     - WBC 0.6 k/microliters, Hgb 8.9 gm/dL, platelets 148,000/microliters  - /microliters, /microliters, absolute monocyte count 30/microliters  - AST 27 U/L, ALT 49 U/L, total bilirubin 1.1 mg/dL  - Direct bilirubin 0.2 mg/dL      Therapy Given: (9/11/2023)     Vincristine 2 mg IV     Oral chemotherapy as above     Maintenance, Cycle 2 Toxicities:     Grade 4 neutropenia: <500/mm3; <0.5 x 10e9 /L  Grade 4 Lymphopenia: <200/mm3; <0.2 x 10e9 /L         Disposition: Return to clinic 2 weeks for reevaluation of labs and dosage adjustment of oral chemotherapy.    Alyce Duenas M.D.  Pediatric Oncology  [facility]  Cell: [phone]  Office: [phone]

## 2023-09-19 ENCOUNTER — OFFICE VISIT (OUTPATIENT)
Dept: PSYCHOLOGY | Facility: MEDICAL CENTER | Age: 23
End: 2023-09-19
Attending: PSYCHOLOGIST
Payer: COMMERCIAL

## 2023-09-19 ENCOUNTER — HOSPITAL ENCOUNTER (OUTPATIENT)
Dept: INFUSION CENTER | Facility: MEDICAL CENTER | Age: 23
End: 2023-09-19
Attending: PEDIATRICS
Payer: COMMERCIAL

## 2023-09-19 VITALS
WEIGHT: 140.21 LBS | SYSTOLIC BLOOD PRESSURE: 119 MMHG | DIASTOLIC BLOOD PRESSURE: 69 MMHG | OXYGEN SATURATION: 100 % | TEMPERATURE: 99.1 F | HEART RATE: 111 BPM | RESPIRATION RATE: 20 BRPM | BODY MASS INDEX: 23.36 KG/M2 | HEIGHT: 65 IN

## 2023-09-19 DIAGNOSIS — C91.Z0 B LYMPHOBLASTIC LEUKEMIA WITH T(9;22)(Q34;Q11.2);BCR-ABL1 (HCC): ICD-10-CM

## 2023-09-19 LAB
ANISOCYTOSIS BLD QL SMEAR: ABNORMAL
BASOPHILS # BLD AUTO: 0 % (ref 0–1.8)
BASOPHILS # BLD: 0 K/UL (ref 0–0.12)
EOSINOPHIL # BLD AUTO: 0.03 K/UL (ref 0–0.51)
EOSINOPHIL NFR BLD: 4.1 % (ref 0–6.9)
ERYTHROCYTE [DISTWIDTH] IN BLOOD BY AUTOMATED COUNT: 68.2 FL (ref 35.9–50)
HCT VFR BLD AUTO: 25.3 % (ref 42–52)
HGB BLD-MCNC: 8.3 G/DL (ref 14–18)
LYMPHOCYTES # BLD AUTO: 0.28 K/UL (ref 1–4.8)
LYMPHOCYTES NFR BLD: 35.5 % (ref 22–41)
MACROCYTES BLD QL SMEAR: ABNORMAL
MANUAL DIFF BLD: NORMAL
MCH RBC QN AUTO: 36.9 PG (ref 27–33)
MCHC RBC AUTO-ENTMCNC: 32.8 G/DL (ref 32.3–36.5)
MCV RBC AUTO: 112.4 FL (ref 81.4–97.8)
MONOCYTES # BLD AUTO: 0.22 K/UL (ref 0–0.85)
MONOCYTES NFR BLD AUTO: 28.1 % (ref 0–13.4)
MORPHOLOGY BLD-IMP: NORMAL
NEUTROPHILS # BLD AUTO: 0.26 K/UL (ref 1.82–7.42)
NEUTROPHILS NFR BLD: 30.6 % (ref 44–72)
NEUTS BAND NFR BLD MANUAL: 1.7 % (ref 0–10)
NRBC # BLD AUTO: 0.26 K/UL
NRBC BLD-RTO: 32.1 /100 WBC (ref 0–0.2)
OVALOCYTES BLD QL SMEAR: NORMAL
PLATELET # BLD AUTO: 100 K/UL (ref 164–446)
PLATELET BLD QL SMEAR: NORMAL
PMV BLD AUTO: 12.5 FL (ref 9–12.9)
POIKILOCYTOSIS BLD QL SMEAR: NORMAL
RBC # BLD AUTO: 2.25 M/UL (ref 4.7–6.1)
RBC BLD AUTO: PRESENT
WBC # BLD AUTO: 0.8 K/UL (ref 4.8–10.8)

## 2023-09-19 PROCEDURE — 96159 HLTH BHV IVNTJ INDIV EA ADDL: CPT | Performed by: PSYCHOLOGIST

## 2023-09-19 PROCEDURE — 36591 DRAW BLOOD OFF VENOUS DEVICE: CPT

## 2023-09-19 PROCEDURE — 85007 BL SMEAR W/DIFF WBC COUNT: CPT

## 2023-09-19 PROCEDURE — 96158 HLTH BHV IVNTJ INDIV 1ST 30: CPT | Performed by: PSYCHOLOGIST

## 2023-09-19 PROCEDURE — 85025 COMPLETE CBC W/AUTO DIFF WBC: CPT

## 2023-09-19 PROCEDURE — 700111 HCHG RX REV CODE 636 W/ 250 OVERRIDE (IP): Mod: UD | Performed by: PEDIATRICS

## 2023-09-19 PROCEDURE — A4212 NON CORING NEEDLE OR STYLET: HCPCS

## 2023-09-19 PROCEDURE — 2023F DILAT RTA XM W/O RTNOPTHY: CPT | Performed by: PSYCHOLOGIST

## 2023-09-19 RX ORDER — 0.9 % SODIUM CHLORIDE 0.9 %
20 VIAL (ML) INJECTION PRN
Status: CANCELLED | OUTPATIENT
Start: 2023-09-19

## 2023-09-19 RX ORDER — HEPARIN SODIUM,PORCINE 10 UNIT/ML
30 VIAL (ML) INTRAVENOUS PRN
Status: CANCELLED
Start: 2023-09-19

## 2023-09-19 RX ADMIN — HEPARIN 500 UNITS: 100 SYRINGE at 11:29

## 2023-09-19 ASSESSMENT — FIBROSIS 4 INDEX: FIB4 SCORE: 0.55

## 2023-09-19 NOTE — PROGRESS NOTES
Viola from Lab called with critical result of WBC 0.8 at 1158. Critical lab result read back to Viola.   Dr. Faye notified of critical lab result at 1204.  Critical lab result read back by Dr. Faye.

## 2023-09-19 NOTE — PROGRESS NOTES
Amarilis from Lab called with critical result of ANC 0.26 at 1305. Critical lab result read back to Amarilis.   Dr. Faye notified of critical lab result at 1335.  Critical lab result read back by Dr. Faye.

## 2023-09-19 NOTE — PROGRESS NOTES
PEDIATRIC BEHAVIORAL HEALTH VISIT    Name:  Tomas Jean-Baptiste  MRN:  [id number]  :  2000  Age:  22 y.o.  Referring Provider: Dr. Faye (Hem/Onc)  Pediatrician:  Margarito Arvizu M.D.  Date of Service:  23    Persons in Attendance: Tomas Roy     Chief Complaint/ Reason for Appointment: JULY, a 23 y/o male currently in treatment for [place] Chromosome Precursor B-Cell Acute Lymphoblastic Leukemia was referred to counseling to assist in decreasing anxiety he has been experiencing and processing ways for how he can find himself now and what life will look like. See intake note dated 23    Mental Status Exam:   General description In no apparent distress, well-groomed, appropriately attired, well-nourished, and cooperative with interview  Interactional style Culturally appropriate  Eye contact Normal and appropriate for culture  Speech Unimpaired, fluid and clear, normal rate and rythem  Motor activity Normal motor activity  Orientation Oriented to person time, place and situation  Intellectual functioning Unimpaired  Memory Unimpaired  Attention and concentration Intact and normative concentration  Fund of knowledge Average  Mood Euthymic  Affect Appropriate  Perceptual Disturbances None apparent  Thought Process  No abnormalities apparent       Associations Unimpaired associations       Abstractions Normal abstractions, intact       Insight Insight - adequate and normative       Judgment Judgments - intact and normative   Thought Content  No apparent delusions    Risk Assessment:  Tomas Roy denied current concerns regarding risk to self or others.       Issues Discussed:   This provider met with JULY to continue assisting in rediscovering himself after the trauma of his cancer diagnosis and treatment as well as how childhood impacts this. Spent time reviewing his grandfather's service and how he felt release in being able to say goodbye. Most of the session was spent  discussing the impact of his childhood on his coping now and ways to process this with his mother. Continued the focus on having hilda toward himself and his younger self as well as continuing to notice triggers.     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 After meeting with JULY during his intake, it appears he could benefit from learning anxiety management skills, processing what he has been through, and how to live the life he wants. Tomas Kirill Jean-Baptiste could benefit from learning appropriate ways of expressing and coping with his emotions. He could also benefit from learning Cognitive Behavioral Therapy (CBT) and Acceptance and Commitment Therapy (ACT) tools to understand the interaction of thoughts, feelings, and actions. As well as Trauma Focused-CBT to process his cancer journey. Tomas Jean-Baptiste agreed with the plan.       PLAN  JULY will learn and utilize appropriate ways to express and cope with emotions.   He will learn the connection between thoughts, feelings, and actions utilizing CBT and ACT tools.,   Continued processing the impact of cancer on his life and ways to notice triggers.   JULY will process how their medical diagnosis is impacting their life.    Next visit we will review ways he lora with emotions, what he has noticed in coming for medical visits now, and begin exploring his core beliefs.     The above diagnostic impressions, recommendations, and treatment plan were discussed with and agreed upon by Tomas Roy, and his caregivers. Care will be coordinated with Tomas  Kirill's healthcare team, as appropriate.    Total time spent on encounter was 60 minutes.    Laurie Ortega, PhD  Pediatric Psychologist   Licensed Psychologist, [place] # [id number]  [facility], Behavioral Health

## 2023-09-19 NOTE — PROGRESS NOTES
[facility] to [facility] for lab draw and DrReid visit.  Afebrile.  VSS.  Awake and alert in no acute distress.  Port accessed with 22 g 3/4 in   and labs drawn from the port without difficulty / with 1 attempt by Tammie Covington RN.   Pt tolerated well.  Visit with Dr. Faye completed. No further orders.  Port flushed per orders (see MAR) and port de-accessed.  Plan to follow up with Dr. Faye for lab results. Next appointment scheduled for 9/26/23.

## 2023-09-26 ENCOUNTER — HOSPITAL ENCOUNTER (OUTPATIENT)
Dept: INFUSION CENTER | Facility: MEDICAL CENTER | Age: 23
End: 2023-09-26
Attending: PEDIATRICS
Payer: COMMERCIAL

## 2023-09-26 VITALS
TEMPERATURE: 97.7 F | DIASTOLIC BLOOD PRESSURE: 73 MMHG | RESPIRATION RATE: 20 BRPM | BODY MASS INDEX: 22.93 KG/M2 | SYSTOLIC BLOOD PRESSURE: 113 MMHG | HEART RATE: 113 BPM | OXYGEN SATURATION: 98 % | WEIGHT: 138.45 LBS

## 2023-09-26 DIAGNOSIS — C91.Z0 B LYMPHOBLASTIC LEUKEMIA WITH T(9;22)(Q34;Q11.2);BCR-ABL1 (HCC): ICD-10-CM

## 2023-09-26 LAB
ANISOCYTOSIS BLD QL SMEAR: ABNORMAL
BASOPHILS # BLD AUTO: 0 % (ref 0–1.8)
BASOPHILS # BLD: 0 K/UL (ref 0–0.12)
EOSINOPHIL # BLD AUTO: 0 K/UL (ref 0–0.51)
EOSINOPHIL NFR BLD: 0 % (ref 0–6.9)
ERYTHROCYTE [DISTWIDTH] IN BLOOD BY AUTOMATED COUNT: 82.6 FL (ref 35.9–50)
HCT VFR BLD AUTO: 26 % (ref 42–52)
HGB BLD-MCNC: 8.4 G/DL (ref 14–18)
LYMPHOCYTES # BLD AUTO: 0.24 K/UL (ref 1–4.8)
LYMPHOCYTES NFR BLD: 22 % (ref 22–41)
MACROCYTES BLD QL SMEAR: ABNORMAL
MANUAL DIFF BLD: NORMAL
MCH RBC QN AUTO: 37.2 PG (ref 27–33)
MCHC RBC AUTO-ENTMCNC: 32.3 G/DL (ref 32.3–36.5)
MCV RBC AUTO: 115 FL (ref 81.4–97.8)
METAMYELOCYTES NFR BLD MANUAL: 2.5 %
MONOCYTES # BLD AUTO: 0.33 K/UL (ref 0–0.85)
MONOCYTES NFR BLD AUTO: 29.7 % (ref 0–13.4)
MORPHOLOGY BLD-IMP: NORMAL
MYELOCYTES NFR BLD MANUAL: 0.9 %
NEUTROPHILS # BLD AUTO: 0.49 K/UL (ref 1.82–7.42)
NEUTROPHILS NFR BLD: 41.5 % (ref 44–72)
NEUTS BAND NFR BLD MANUAL: 3.4 % (ref 0–10)
NRBC # BLD AUTO: 0 K/UL
NRBC BLD-RTO: 0 /100 WBC (ref 0–0.2)
OVALOCYTES BLD QL SMEAR: NORMAL
PLATELET # BLD AUTO: 159 K/UL (ref 164–446)
PLATELET BLD QL SMEAR: NORMAL
PMV BLD AUTO: 10.1 FL (ref 9–12.9)
POIKILOCYTOSIS BLD QL SMEAR: NORMAL
RBC # BLD AUTO: 2.26 M/UL (ref 4.7–6.1)
RBC BLD AUTO: PRESENT
SCHISTOCYTES BLD QL SMEAR: NORMAL
WBC # BLD AUTO: 1.1 K/UL (ref 4.8–10.8)

## 2023-09-26 PROCEDURE — 85025 COMPLETE CBC W/AUTO DIFF WBC: CPT

## 2023-09-26 PROCEDURE — 99213 OFFICE O/P EST LOW 20 MIN: CPT | Performed by: PEDIATRICS

## 2023-09-26 PROCEDURE — 85007 BL SMEAR W/DIFF WBC COUNT: CPT

## 2023-09-26 PROCEDURE — 36591 DRAW BLOOD OFF VENOUS DEVICE: CPT

## 2023-09-26 PROCEDURE — A4212 NON CORING NEEDLE OR STYLET: HCPCS

## 2023-09-26 PROCEDURE — 700111 HCHG RX REV CODE 636 W/ 250 OVERRIDE (IP): Mod: UD | Performed by: PEDIATRICS

## 2023-09-26 RX ORDER — 0.9 % SODIUM CHLORIDE 0.9 %
20 VIAL (ML) INJECTION PRN
Status: CANCELLED | OUTPATIENT
Start: 2023-09-26

## 2023-09-26 RX ORDER — HEPARIN SODIUM,PORCINE 10 UNIT/ML
30 VIAL (ML) INTRAVENOUS PRN
Status: CANCELLED
Start: 2023-09-26

## 2023-09-26 RX ADMIN — HEPARIN 500 UNITS: 100 SYRINGE at 09:59

## 2023-09-26 ASSESSMENT — FIBROSIS 4 INDEX: FIB4 SCORE: 0.85

## 2023-09-26 NOTE — PROGRESS NOTES
Pediatric Hematology/Oncology Clinic  Short Interval Progress Note      Patient Name:  Tomas Jean-Baptiste  : 2000   MRN: [id number]    Location of Service: [facility]    Date of Service: 2023  Time: 10:16 AM    Primary Care Physician: Margarito Arvizu M.D.    HISTORY OF PRESENT ILLNESS:     Chief Complaint: Laboratory evaluation for assessment of myelosuppression and possible restarting of chemotherapy    History of Present Illness: Tomas Jean-Baptiste is a 23 y.o.  male who returns to the [facility] for follow-up labs after holding his oral chemotherapy (with the exception of imatinib) secondary to myelosuppression.    Clinical history unchanged from 2023 at which point oral chemotherapy was held.    Interval history is remarkable for labs obtained on 2023 which were remarkable for WBC 0.8, Hgb 8.3 and a platelet count of 100.  ANC at the time was 260 however there was an absolute monocyte count of 220.  As counts had not yet improved with ANC greater than 500, oral chemotherapy remained held.  Today on presentation, Tomas Roy reports that he continues to take imatinib 550 mg QAM but has not been taking any of his methotrexate or 6-MP.  He has held Bactrim as instructed.    Today, he denies any fever or signs and symptoms of acute illness.  His energy and activity are improving steadily per his report.  He continues to attend school full-time and in fact has exams later this week that he has been studying for.  He reports that he is able to maintain his coursework as well as attend school without any interruptions or complications secondary to his illness.  He denies any neurologic changes, changes in vision or headaches.  He does report that he has started to try and wear shoes more as the weather is changing.  He has been wearing sandals on account of his neuropathy in his feet.  He reports  that the neuropathy at this point is stable but not improving.  No other concerns or complaints at this time.    Review of Systems:     Constitutional: Afebrile.  Without recent illness.  Energy and activity are good.   HENT: Negative.  Eyes: Negative for visual changes.  Respiratory: Negative for shortness of breath.  Cardiovascular: Negative.  Gastrointestinal: Negative.  Genitourinary: Negativ.  Musculoskeletal: Negative for joint or muscle pains.    Skin: Negative for rash, signs of infection.  Neurological: Neuropathy in feet as above.  Endo/Heme/Allergies: Does not bruise/bleed easily.    Psychiatric/Behavioral: No changes in mood, appropriate for age.     PAST MEDICAL HISTORY:     Oncologic History:  2-3 week history of worsening fatigue, right lower extremity pain  Presentation to OS and diagnosed with right LE superficial thrombus, subsegmental PE and hyperleukocytosis, anemia and thrombocytopenia  Transferred to [facility] for definitive care  Presenting (local) WBC > 440K, Hgb 10.0, platelets 53, (automated differential ANC 3190, ALC 75,310, absolute monocyte count 93759, absolute blast count 340,560)  Uric Acid 15.6, phosphorus 5.6, LDH 1114  Rasburicase x 1 dose given   Peripheral Blood flow cytometry demonstrating CD10 pos, CD19 pos, CD20 neg, CD22 dim (60%) 5/28/2022  Peripheral blood FISH for BCR-ABL1 positive in 98% of analyzed cells     Age at Diagnosis: 21 years  White Blood Cell Count at Presentation: > 440 k/uL  Testicular Disease Status: Negative (see procedure note 5/30/2022)  CNS Status: 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, diagnostic LP with WBC 1, RBC 3 and no evidence of leukemic blasts 5/30/2022  Steroid Pre-treatment: None  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     All inclusion/exclusion criteria for [id number] met and consent signed - enrolled 5/29/2022   All inclusion/exclusion criteria for [id number] met  and consent signed - enrolled 5/30/2022  Confirmatory bone marrow aspirate and biopsy and diagnostic LP + cytarabine 70 mg IT 5/30/2022  Induction therapy (ON STUDY [id number]) started 5/30/2022  Bone marrow immunohistochemistry consistent with diagnosis of B-ALL comprising 90% of marrow cellularity  Bone marrow sample sent to [facility] purposes:  Flow cytom  Of the blood pressure little high that is a problem is a cultural problem is well and cultural genetic and everything else like that unfortunately breathalyzers such bad heart disease diabetes things like that  populations etry consistent with peripheral blood, cytogenetics remarkable for known t(9;22)  CSF with WBC 1, RBC 3, no blasts identified on cytospin  FISH results available 5/31/2022 making patient eligible for transfer from [place] [address] to [place] [address] as eligibility requirements were met for [place] [address]  Patient unenrolled from [place] [address] (BCR-ABL1 fusion positive) 6/1/2022  Consent signed for [place] [address] and patient enrolled 6/1/2022 (see eligibility checklist from 5/31/2022 and consent note from 6/1/2022)  Imatinib 400 mg PO QAM / 200 mg PO QPM started 6/3/2022 (allowed per [place] [address])  Patient completed the first 15 days of a Standard 4-drug Induction on 6/13/2022  Start of [place] [address](OS), Induction IA Part 2, Day 15 6/13/2022  End of Induction 1A Part 2 - MRD negative  Start of [place] [address](OS), Induction IA Part 2, Day 15 7/5/2022  Induction IB DELAYED 2 weeks 14 days from 7/26/2022-8/9/2022) for myelosuppression - Start of Day 22 cytarabine block 8/9/2022  Induction IB Day 42 delayed 9 days for myelosuppression - Day 42 evaluations 9/7/2022  End of Induction IB - Flow cytometric MRD negative, MRD by IgH-TCR PCR [id number]%  Randomization to AR-Experimental Arm B of [place] [address]  Start of AR-Experimental Arm B, Interim Maintenance 9/29/2022  Weight based increase in dose of imatinib to 400 mg PO AM and 250 mg PM 9/29/2022  Thrombocytopenia not  "permissive of proceeding with Day 15 of Interim Maintenance  AR-Experimental Arm B, Interim Maintenance, Day 15 delayed 4 days, start 10/17/2022  AR-Experimental Arm B, Interim Maintenance, Day 29, start 11/1/2022  AR-Experimental Arm B, Interim Maintenance, Day 43, start 11/14/2022  Last does of 6-MP 11/27/2022  AR-Experimental Arm B, Delayed Intensification, Day 1, start 12/6/2022  Admission with Severe Septic Shock 12/27/2022  Imatinib HELD 12/27/2022-1/8/2023  AR-Experimental Arm B, Delayed Intensification, Day 29 DELAYED 14 days with start 1/17/2023  Hospitalization 2/7/2023 on Day 50 of Delayed Intensification with left shoulder pain ultimately diagnosed with Bacteroides fragilis infection  AR-Experimental Arm B, Interim Maintenance, Day 1 DELAYED 7 days with start 2/27/2023  AR-Experimental Arm B, Interim Maintenance, Day 11 DELAYED 4 days for platelets 43K on 3/9/2023  AR-Experimental Arm B, Interim Maintenance, Day 11 VCR given and MTX omitted for platelets 43K 3/13/2023  Imatinib HELD 3/30/2023-4/11/2023  AR-Experimental Arm B, Interim Maintenance, Day 21 (DELAYED 22 DAYS) - administered 4/11/2023 with methotrexate 80 mg/m² IV  AR-Experimental Arm B, Interim Maintenance, Day 31 (\"true\" Day 31 4/25/2023) - VCR and IT MTX given 4/28/2023 - IV MTX omitted  AR-Experimental Arm B, Interim Maintenance, Day 41 met with counts - administered 5/5/2023 with methotrexate 80 mg/m² IV     Start of Maintenance, Cycle 1, Day 1 - 5/22/2023  Cranial radiation 6/5/2023-6/16/2023 (10 fractions)  Maintenance, Cycle 1, Day 29 - 6/23/2023 (no IT MTX given)  Maintenance, Cycle 1, Day 67, presented with fatigue nausea and vomiting found to have ANC less than 500  Methotrexate (oral) and mercaptopurine held 7/27/2023 - continued with imatinib  Methotrexate (oral) and mercaptopurine held 8/2/2023 - continued with imatinib  Methotrexate (oral) and mercaptopurine held 8/7/2023 - continued with imatinib  Restarted methotrexate (oral) " and mercaptopurine at 100% previous dosing on 8/11/2023 - continued with imatinib  Maintenance, Cycle 2, Day 1 - 8/14/2023  Methotrexate (oral) and mercaptopurine held again 9/11/2023 - continued with imatinib  Methotrexate (oral) and mercaptopurine held again 9/19/2023 - continued with imatinib     Past Medical History:    1) Previously Healthy  2) 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  3) Hyperleukocytosis  4) Hyperuricemia  5) Hyperphosphatemia  6) Right Lower Extremity Superficial Thrombus  7) Subsegmental Pulmonary Embolism  8) 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  9) Bacteroides fragilis soft tissue infection left shoulder     Past Surgical History:     1) Temporary Right IJ Pharesis Catheter Placement 5/28/2022  2) Right-sided Port-A-Cath placement 8/29/2022  3) IR drainage left calf hematoma  4) Left shoulder I&D  5) Cranial XRT 6/5/2023-6/16/2023     Birth/Developmental History:   1st of three children  Unremarkable pregnancy  Unremarkable delivery     Allergies:             Allergies as of 05/27/2022 - Reviewed 05/27/2022   Allergen Reaction Noted    Amoxicillin   04/03/2020      Social History:   Lives at home with mother, brother and sister.  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   Two dogs. Father not involved.     Family History:     Family History             Family History   Problem Relation Age of Onset    No Known Problems Mother      Diabetes Paternal Grandfather      Hypertension Paternal Grandfather      Hyperlipidemia Paternal Grandfather      Cancer Neg Hx      Heart Disease Neg Hx      Stroke Neg Hx           No significant family history of cancer.  Both maternal and paternal family history of diabetes.     Immunizations:  UTD       Medications:   Current Outpatient Medications on File Prior to Encounter   Medication Sig Dispense Refill    LORazepam (ATIVAN) 1 MG Tab Take 1 mg by mouth every four hours as needed for Anxiety.      omeprazole (PRILOSEC) 20 MG delayed-release capsule Take 1  Capsule by mouth every day for 180 days. (Patient not taking: Reported on 9/19/2023) 30 Capsule 5    mercaptopurine (PURINETHOL) 50 MG Tab Take 2.5 tablets by mouth in the evening x 6 days and 3 tablets by mouth in the evening x 1 day each week. (Patient not taking: Reported on 9/19/2023) 72 Tablet 5    predniSONE (DELTASONE) 10 MG Tab Take 3.5 tablets by mouth in the morning and evening for 5 Days at Day 1, 29 and 57 of each cycle. (Patient not taking: Reported on 9/19/2023) 35 Tablet 6    methotrexate 2.5 MG Tab Take 14 Tablets (all at the same time) by mouth every 7 days on weeks when not receiving lumbar puncture (Patient not taking: Reported on 9/19/2023) 56 Tablet 5    imatinib (GLEEVEC) 400 MG tablet Take 1 Tablet by mouth every day. Pt takes 200 mg + 400 mg for a total dose of 600 mg daily (Patient taking differently: Take 400 mg by mouth every day. Pt takes 150 mg + 400 mg for a total dose of 550 mg daily) 30 Tablet 5    imatinib (GLEEVEC) 100 MG tablet Take 2 Tablets by mouth every day. Pt takes 200 mg + 400 mg for a total dose of 600 mg daily (Patient taking differently: Take 200 mg by mouth every day. Pt takes 150 mg + 400 mg for a total dose of 550 mg daily) 60 Tablet 5    sulfamethoxazole-trimethoprim (BACTRIM DS) 800-160 MG tablet Take 2 Tablets by mouth twice daily two days a week. (Patient not taking: Reported on 9/19/2023) 48 Tablet 11    Melatonin 5 MG Cap Take 5 mg by mouth at bedtime as needed.      ondansetron (ZOFRAN ODT) 8 MG TABLET DISPERSIBLE Dissolve 1 Tablet by mouth every 6 hours as needed for Nausea/Vomiting. 10 Tablet 0     No current facility-administered medications on file prior to encounter.     OBJECTIVE:     Vitals:   /73   Pulse (!) 113   Temp 36.5 °C (97.7 °F) (Temporal)   Resp 20   Wt 62.8 kg (138 lb 7.2 oz)   SpO2 98%     Labs:    No visits with results within 2 Day(s) from this visit.   Latest known visit with results is:   Hospital Outpatient Visit on 09/19/2023    Component Date Value    WBC 09/19/2023 0.8 (LL)     RBC 09/19/2023 2.25 (L)     Hemoglobin 09/19/2023 8.3 (L)     Hematocrit 09/19/2023 25.3 (L)     MCV 09/19/2023 112.4 (H)     MCH 09/19/2023 36.9 (H)     MCHC 09/19/2023 32.8     RDW 09/19/2023 68.2 (H)     Platelet Count 09/19/2023 100 (L)     MPV 09/19/2023 12.5     Neutrophils-Polys 09/19/2023 30.60 (L)     Lymphocytes 09/19/2023 35.50     Monocytes 09/19/2023 28.10 (H)     Eosinophils 09/19/2023 4.10     Basophils 09/19/2023 0.00     Nucleated RBC 09/19/2023 32.10 (H)     Neutrophils (Absolute) 09/19/2023 0.26 (LL)     Lymphs (Absolute) 09/19/2023 0.28 (L)     Monos (Absolute) 09/19/2023 0.22     Eos (Absolute) 09/19/2023 0.03     Baso (Absolute) 09/19/2023 0.00     NRBC (Absolute) 09/19/2023 0.26     Anisocytosis 09/19/2023 1+     Macrocytosis 09/19/2023 1+     Bands-Stabs 09/19/2023 1.70     Manual Diff Status 09/19/2023 PERFORMED     Peripheral Smear Review 09/19/2023 see below     Plt Estimation 09/19/2023 Decreased     RBC Morphology 09/19/2023 Present     Poikilocytosis 09/19/2023 1+     Ovalocytes 09/19/2023 1+         Physical Exam:    Constitutional: Well-developed, well-nourished, and in no distress.  Very well-appearing.  HENT: Normocephalic and atraumatic. No nasal congestion or rhinorrhea.   Eyes: Conjunctivae are normal. Pupils are equal, round.  EOMI.  Nonicteric.  Musculoskeletal: Normal range of motion of lower and upper extremities bilaterally.   Neurological: Alert and oriented to person and place. Exhibits normal muscle tone bilaterally in upper and lower extremities. Gait normal. Coordination normal.    Skin: Skin is warm, dry and pink.  No rash or evidence of skin infection.  No pallor.   Psychiatric: Mood and affect normal for age.    ASSESSMENT AND PLAN:     Tomas Mosque JULY Jean-Baptiste is a previously healthy 23 year old male with  Precursor B-Cell Lymphoblastic Leukemia with BCR-ABL1 fusion and whose End of Induction IB MRD was both  negative by flow cytometry and PCR who presents for Maintenance, Cycle 2, Day 44 evaluation and assessment of myelosuppression     1) Ph+ Precursor B-Cell Acute Lymphoblastic Leukemia, in MRD Remission:  - 2-3 weeks of symptoms  - Presenting WBC > 440 k/uL, hyperleukocytosis  - Start of Hydroxyurea (1500 mg PO Q8) 2320 on 5/27/2022  - discontinued after only 55 hours  - No steroid pretreatment  - 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  - Testicular status NEGATIVE       - Flow cytometry of both 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, FISH positive for BCR-ABL1 translocation  - Enrolled on [facility] [id number]  - Initially enrolled on [facility] [id number] - but 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                            - Enrolled on [facility] [id number] and began study 6/13/2022              - Started imatinib therapy 6/3/2022   - End of Induction IA and IB MRD negative  - Imatinib dose increased to 400 mg PO AM and 250 mg PM 9/29/2022  -* Note:  All imatinib doses given inpatient rounded down to 600 mg  - Imatinib held for Septic Shock 12/27/2022-1/8/2023  - Imatinib 600 mg PO daily restarted 1/8/2023 inpatient  - Imatinib 400 mg PO QAM and 250 mg PO QHS 1/14/2023-1/17/2023  - Imatinib 600 mg PO QAM 1/17/2023 - 3/30/2023  - Imatinib 600 mg PO QAM 4/11/2023 - 8/13/2023  - Imatinib 550 mg PO QAM 8/14/2023 - PRESENT     - Imatinib dosing changed every 12 weeks. Continue same dosing at 550 mg PO daily                - WBC 1.1, Hgb 8.4, platelets 159             - , , absolute monocyte count 330                - Given  (<750) we will continue to hold all oral chemotherapy and given that this is now 14 days with myelosuppression, will hold imatinib until counts return above 500.    [id number], AR Arm B, Maintenance, Cycle 2, Day 44:      ** HOLD imatinib to 550 mg PO daily  ** HOLD methotrexate 14 tablets PO weekly (based on Cycle 2, Day 1 weight 103.55% of  expected dose)                          ** HOLD 6-mercaptopurine 2.5 tablets = 125 mg PO daily x 7 days  (Based on Cycle 2, Day 1 weight  98.62% of expected dose)     - Return to clinic        3) Chemotherapy Related Pancytopenia:    - Transfuse for hemoglobin less than 7 gm/dL or symptomatic  - Transfuse for platelets less than 10,000/microliters or symptomatic  - No transfusions necessary today     4) At Risk of Opportunistic Lung Infection:  - Bactrim DS PO BID Sat and Sun for PJP prophylaxis   - Held for myelosuppression     5) Central Access:   - R Port-A-Cath in place      6) Research Participant: ON STUDY [id number], AR Arm B, Maintenance, Cycle 2, Day 44             [facility] - Source Data         Diagnosis: Ph+ Precursor B-Cell Acute Lymphoblastic Leukemia     Disease Status: EOI1A MRD NEGATIVE, EOI1B RD NEGATIVE, CNS3c, testicular negative, HSV1 IgG POSITIVE, CMV IgG NEGATIVE, VARICELLA IgG POSITIVE     Active Studies: [id number], [id number]                                                                                                      Inactive Studies: [id number]                                                                                                                                                Optional Studies: None             Protocol: International Phase 3 trial in [place] 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      Treatment Plan: [id number](OS), AR-Arm B, Maintenance, Cycle 2, Day 44 (9/26/2023)     Height: 1.65 m     Weight: 58.9 kg     BSA: 1.65 m²   (Maintenance, Cycle 2,   Day 29, 9/11/2023)                                                                                                                                           Performance Status: Karnofsky 80, ECOG 1 (8/14/2023)     Treatment Plan Medications:  (100% adherent with imatinib - 100% adherent with HOLD)      HOLD oral 6- mercaptopurine (NOW 14 DAYS HELD)  HOLD oral MTX (NOW 14 DAYS HELD)  HOLD Imatinib 550 mg PO daily (14 DAYS MYELOSUPPRESSION)     Evaluations / Study Labs:  (9/26/2023)     WBC 1.1, Hgb 8.4, platelets 159  , , absolute monocyte count 330    Therapy Given: (9/11/2023)     Oral chemotherapy as above     Maintenance, Cycle 2 Toxicities:     Grade 4 neutropenia: <500/mm3; <0.5 x 10e9 /L  Grade 4 lymphopenia: <200/mm3; <0.2 x 10e9 /L         Disposition: Return to clinic end of week for repeat counts    Pepe Faye MD  Pediatric Hematology / Oncology  [facility]  Cell.  [phone]  [facility]. [phone]

## 2023-09-26 NOTE — ADDENDUM NOTE
Encounter addended by: Pepe Faye M.D. on: 9/26/2023 11:23 AM   Actions taken: Clinical Note Signed, Level of Service modified

## 2023-09-26 NOTE — PROGRESS NOTES
Pt to [facility] for lab draw.  Afebrile.  VSS.  Awake and alert in no acute distress.  Port accessed with 22 g 3/4 in and labs drawn from the port without difficulty / with 1 attempt.   Pt tolerated well.  Port flushed per orders (see MAR) and port de-accessed.  Plan to follow up with Dr. Faye for lab results. Next appointment for chemotherapy scheduled for 10/9/23.

## 2023-09-26 NOTE — PROGRESS NOTES
Eliana from Lab called with critical result of WBC 1.1 and ANC 0.49 at 1053. Critical lab result read back to Eliana.   Dr. Faye notified of critical lab result at 1134.  Critical lab result read back by Dr. Faye.

## 2023-09-26 NOTE — ADDENDUM NOTE
Encounter addended by: Cherrie Urbano R.N. on: 9/26/2023 1:52 PM   Actions taken: Clinical Note Signed

## 2023-09-28 DIAGNOSIS — C91.Z0 B LYMPHOBLASTIC LEUKEMIA WITH T(9;22)(Q34;Q11.2);BCR-ABL1 (HCC): ICD-10-CM

## 2023-09-29 ENCOUNTER — HOSPITAL ENCOUNTER (OUTPATIENT)
Dept: INFUSION CENTER | Facility: MEDICAL CENTER | Age: 23
End: 2023-09-29
Attending: PEDIATRICS
Payer: COMMERCIAL

## 2023-09-29 DIAGNOSIS — C91.Z0 B LYMPHOBLASTIC LEUKEMIA WITH T(9;22)(Q34;Q11.2);BCR-ABL1 (HCC): ICD-10-CM

## 2023-09-29 LAB
BASOPHILS # BLD AUTO: 0.4 % (ref 0–1.8)
BASOPHILS # BLD: 0.01 K/UL (ref 0–0.12)
EOSINOPHIL # BLD AUTO: 0.01 K/UL (ref 0–0.51)
EOSINOPHIL NFR BLD: 0.4 % (ref 0–6.9)
ERYTHROCYTE [DISTWIDTH] IN BLOOD BY AUTOMATED COUNT: 73.3 FL (ref 35.9–50)
HCT VFR BLD AUTO: 25.8 % (ref 42–52)
HGB BLD-MCNC: 8.6 G/DL (ref 14–18)
IMM GRANULOCYTES # BLD AUTO: 0.03 K/UL (ref 0–0.11)
IMM GRANULOCYTES NFR BLD AUTO: 1.3 % (ref 0–0.9)
LYMPHOCYTES # BLD AUTO: 0.28 K/UL (ref 1–4.8)
LYMPHOCYTES NFR BLD: 11.8 % (ref 22–41)
MCH RBC QN AUTO: 38.2 PG (ref 27–33)
MCHC RBC AUTO-ENTMCNC: 33.3 G/DL (ref 32.3–36.5)
MCV RBC AUTO: 114.7 FL (ref 81.4–97.8)
MONOCYTES # BLD AUTO: 0.51 K/UL (ref 0–0.85)
MONOCYTES NFR BLD AUTO: 21.5 % (ref 0–13.4)
NEUTROPHILS # BLD AUTO: 1.53 K/UL (ref 1.82–7.42)
NEUTROPHILS NFR BLD: 64.6 % (ref 44–72)
NRBC # BLD AUTO: 0 K/UL
NRBC BLD-RTO: 0 /100 WBC (ref 0–0.2)
PLATELET # BLD AUTO: 209 K/UL (ref 164–446)
PMV BLD AUTO: 10.1 FL (ref 9–12.9)
RBC # BLD AUTO: 2.25 M/UL (ref 4.7–6.1)
WBC # BLD AUTO: 2.4 K/UL (ref 4.8–10.8)

## 2023-09-29 PROCEDURE — 36591 DRAW BLOOD OFF VENOUS DEVICE: CPT

## 2023-09-29 PROCEDURE — 700111 HCHG RX REV CODE 636 W/ 250 OVERRIDE (IP): Mod: UD | Performed by: PEDIATRICS

## 2023-09-29 PROCEDURE — A4212 NON CORING NEEDLE OR STYLET: HCPCS

## 2023-09-29 PROCEDURE — 85025 COMPLETE CBC W/AUTO DIFF WBC: CPT

## 2023-09-29 RX ORDER — HEPARIN SODIUM,PORCINE 10 UNIT/ML
30 VIAL (ML) INTRAVENOUS PRN
Status: CANCELLED
Start: 2023-09-29

## 2023-09-29 RX ORDER — 0.9 % SODIUM CHLORIDE 0.9 %
20 VIAL (ML) INJECTION PRN
Status: CANCELLED | OUTPATIENT
Start: 2023-09-29

## 2023-09-29 RX ADMIN — HEPARIN 500 UNITS: 100 SYRINGE at 12:56

## 2023-09-29 NOTE — PROGRESS NOTES
Pt to [facility] for lab draw and Dr. gong.  Afebrile.  VSS.  Awake and alert in no acute distress.  Port accessed with 20 g 3/4in   and labs drawn from the port without difficulty / with 1 attempt.   Pt tolerated well.  Port flushed per orders (see MAR) and port de-accessed.  Plan to follow up with Dr Faye for results .

## 2023-10-06 NOTE — PROGRESS NOTES
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 constipation.  No abdominal discomfort or pain.  Not complaining of any aches or pains.  Continues to have some persistent neuropathy in feet that is unchanged.  Taking medication as prescribed, mercaptopurine 2.5 tabs x6 days and 3 tabs x1 day each week.  He continues to take 14 tablets of methotrexate each week.  He continues to take his imatinib as prescribed, 600 mg PO QAM.  no other concerns or complaints at this time.    Review of Systems:     Constitutional:  Afebrile. No remote or acute illness.  Energy and activity are at baseline.    HENT: Negative for auditory changes, nosebleeds and sore throat.  No mouth sores.  Eyes: Negative for visual changes.  Respiratory: Negative for shortness of breath or stridor.   Cardiovascular: Negative for chest pain.  Gastrointestinal: Negative for nausea, vomiting, abdominal pain, diarrhea,  constipation.  Genitourinary: Negative.  Musculoskeletal: Negative for joint or muscle pain.  Skin: Negative for rash, signs of infection.  Neurological: Negative for numbness, tingling, sensory changes, weakness or headaches.    Endo/Heme/Allergies: Does not bruise/bleed easily.    Psychiatric/Behavioral: No changes in mood, appropriate for age.     PAST MEDICAL HISTORY:     Oncologic History:  2-3 week history of worsening fatigue, right lower extremity pain  Presentation to OS and diagnosed with right LE superficial thrombus, subsegmental PE and hyperleukocytosis, anemia and thrombocytopenia  Transferred to [facility] for definitive care  Presenting (local) WBC > 440K, Hgb 10.0, platelets 53, (automated differential ANC 3190, ALC 75,310, absolute monocyte count 79748, absolute blast count 340,560)  Uric Acid 15.6, phosphorus 5.6, LDH 1114  Rasburicase x 1 dose given   Peripheral Blood flow cytometry demonstrating CD10 pos, CD19 pos, CD20 neg, CD22 dim (60%) 5/28/2022  Peripheral blood FISH for BCR-ABL1 positive in 98% of analyzed cells     Age at Diagnosis: 21 years  White Blood Cell Count at Presentation: > 440 k/uL  Testicular Disease Status: Negative (see procedure note 5/30/2022)  CNS Status: 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, diagnostic LP with WBC 1, RBC 3 and no evidence of leukemic blasts 5/30/2022  Steroid Pre-treatment: None  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     All inclusion/exclusion criteria for [id number] met and consent signed - enrolled 5/29/2022   All inclusion/exclusion criteria for [id number] met and consent signed - enrolled 5/30/2022  Confirmatory bone marrow aspirate and biopsy and diagnostic LP + cytarabine 70 mg IT 5/30/2022  Induction therapy (ON STUDY [id number]) started 5/30/2022  Bone marrow immunohistochemistry consistent with diagnosis of B-ALL comprising 90% of marrow cellularity  Bone marrow sample  [facility] [place] [facility] [place]  Flow cytom  Of the blood pressure little high that is a problem is a cultural problem is well and cultural genetic and everything else like that unfortunately breathalyzers such bad heart disease diabetes things like that  populations etry consistent with peripheral blood, cytogenetics remarkable for known t(9;22)  CSF with WBC 1, RBC 3, no blasts identified on cytospin  FISH results available 5/31/2022 making patient eligible for transfer from [place] [address] to [place] [address] as eligibility requirements were met for [place] [address]  Patient unenrolled from [place] [address] (BCR-ABL1 fusion positive) 6/1/2022  Consent signed for [place] [address] and patient enrolled 6/1/2022 (see eligibility checklist from 5/31/2022 and consent note from 6/1/2022)  Imatinib 400 mg PO QAM / 200 mg PO QPM started 6/3/2022 (allowed per [place] [address])  Patient completed the first 15 days of a Standard 4-drug Induction on 6/13/2022  Start of [place] [address](OS), Induction IA Part 2, Day 15 6/13/2022  End of Induction 1A Part 2 - MRD negative  Start of [place] [address](OS), Induction IA Part 2, Day 15 7/5/2022  Induction IB DELAYED 2 weeks 14 days from 7/26/2022-8/9/2022) for myelosuppression - Start of Day 22 cytarabine block 8/9/2022  Induction IB Day 42 delayed 9 days for myelosuppression - Day 42 evaluations 9/7/2022  End of Induction IB - Flow cytometric MRD negative, MRD by IgH-TCR PCR [id number]%  Randomization to AR-Experimental Arm B of [place] [address]  Start of AR-Experimental Arm B, Interim Maintenance 9/29/2022  Weight based increase in dose of imatinib to 400 mg PO AM and 250 mg PM 9/29/2022  Thrombocytopenia not permissive of proceeding with Day 15 of Interim Maintenance  AR-Experimental Arm B, Interim Maintenance, Day 15 delayed 4 days, start 10/17/2022  AR-Experimental Arm B, Interim Maintenance, Day 29, start 11/1/2022  AR-Experimental Arm B, Interim Maintenance, Day 43, start 11/14/2022  Last does of 6-MP  "11/27/2022  AR-Experimental Arm B, Delayed Intensification, Day 1, start 12/6/2022  Admission with Severe Septic Shock 12/27/2022  Imatinib HELD 12/27/2022-1/8/2023  AR-Experimental Arm B, Delayed Intensification, Day 29 DELAYED 14 days with start 1/17/2023  Hospitalization 2/7/2023 on Day 50 of Delayed Intensification with left shoulder pain ultimately diagnosed with Bacteroides fragilis infection  AR-Experimental Arm B, Interim Maintenance, Day 1 DELAYED 7 days with start 2/27/2023  AR-Experimental Arm B, Interim Maintenance, Day 11 DELAYED 4 days for platelets 43K on 3/9/2023  AR-Experimental Arm B, Interim Maintenance, Day 11 VCR given and MTX omitted for platelets 43K 3/13/2023  Imatinib HELD 3/30/2023-4/11/2023  AR-Experimental Arm B, Interim Maintenance, Day 21 (DELAYED 22 DAYS) - administered 4/11/2023 with methotrexate 80 mg/m² IV  AR-Experimental Arm B, Interim Maintenance, Day 31 (\"true\" Day 31 4/25/2023) - VCR and IT MTX given 4/28/2023 - IV MTX omitted  AR-Experimental Arm B, Interim Maintenance, Day 41 met with counts - administered 5/5/2023 with methotrexate 80 mg/m² IV     Start of Maintenance, Cycle 1, Day 1 - 5/22/2023  Cranial radiation 6/5/2023-6/16/2023 (10 fractions)    Past Medical History:    1) Previously Healthy  2) 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  3) Hyperleukocytosis  4) Hyperuricemia  5) Hyperphosphatemia  6) Right Lower Extremity Superficial Thrombus  7) Subsegmental Pulmonary Embolism  8) 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  9) Bacteroides fragilis soft tissue infection left shoulder     Past Surgical History:     1) Temporary Right IJ Pharesis Catheter Placement 5/28/2022  2) Right-sided Port-A-Cath placement 8/29/2022  3) IR drainage left calf hematoma  4) Left shoulder I&D  5) Cranial XRT 6/5/2023-6/16/2023     Birth/Developmental History:   1st of three children  Unremarkable pregnancy  Unremarkable delivery     Allergies:             Allergies as of 05/27/2022 - " Reviewed 05/27/2022   Allergen Reaction Noted    Amoxicillin   04/03/2020      Social History:   Lives at home with mother, brother and sister.  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   Two dogs. Father not involved.     Family History:     Family History             Family History   Problem Relation Age of Onset    No Known Problems Mother      Diabetes Paternal Grandfather      Hypertension Paternal Grandfather      Hyperlipidemia Paternal Grandfather      Cancer Neg Hx      Heart Disease Neg Hx      Stroke Neg Hx           No significant family history of cancer.  Both maternal and paternal family history of diabetes.     Immunizations:  UTD     Medications:   Current Outpatient Medications on File Prior to Encounter   Medication Sig Dispense Refill    methotrexate 2.5 MG Tab Take 14 Tablets (all at the same time) by mouth every 7 days on weeks when not receiving lumbar puncture (Patient not taking: Reported on 9/19/2023) 56 Tablet 5    imatinib (GLEEVEC) 400 MG tablet Take 1 Tablet by mouth every day. Pt takes 200 mg + 400 mg for a total dose of 600 mg daily (Patient taking differently: Take 400 mg by mouth every day. Pt takes 150 mg + 400 mg for a total dose of 550 mg daily) 30 Tablet 5    imatinib (GLEEVEC) 100 MG tablet Take 2 Tablets by mouth every day. Pt takes 200 mg + 400 mg for a total dose of 600 mg daily (Patient taking differently: Take 200 mg by mouth every day. Pt takes 150 mg + 400 mg for a total dose of 550 mg daily) 60 Tablet 5    sulfamethoxazole-trimethoprim (BACTRIM DS) 800-160 MG tablet Take 2 Tablets by mouth twice daily two days a week. (Patient not taking: Reported on 9/19/2023) 48 Tablet 11    Melatonin 5 MG Cap Take 5 mg by mouth at bedtime as needed.      ondansetron (ZOFRAN ODT) 8 MG TABLET DISPERSIBLE Dissolve 1 Tablet by mouth every 6 hours as needed for Nausea/Vomiting. 10 Tablet 0     No current facility-administered medications on file prior to encounter.         OBJECTIVE:     Vitals:   BP  "103/59   Pulse 77   Temp 37.4 °C (99.3 °F) (Temporal)   Resp 18   Ht 1.659 m (5' 5.32\")   Wt 63.6 kg (140 lb 3.4 oz)   SpO2 100%     Labs:    Hospital Outpatient Visit on 07/17/2023   Component Date Value    WBC 07/17/2023 0.9 (LL)     RBC 07/17/2023 2.29 (L)     Hemoglobin 07/17/2023 8.5 (L)     Hematocrit 07/17/2023 26.6 (L)     MCV 07/17/2023 116.2 (H)     MCH 07/17/2023 37.1 (H)     MCHC 07/17/2023 32.0 (L)     RDW 07/17/2023 71.4 (H)     Platelet Count 07/17/2023 110 (L)     MPV 07/17/2023 10.1     Neutrophils-Polys 07/17/2023 65.00     Lymphocytes 07/17/2023 20.00 (L)     Monocytes 07/17/2023 6.70     Eosinophils 07/17/2023 5.00     Basophils 07/17/2023 0.00     Nucleated RBC 07/17/2023 0.00     Neutrophils (Absolute) 07/17/2023 0.59 (L)     Lymphs (Absolute) 07/17/2023 0.18 (L)     Monos (Absolute) 07/17/2023 0.06     Eos (Absolute) 07/17/2023 0.05     Baso (Absolute) 07/17/2023 0.00     NRBC (Absolute) 07/17/2023 0.00     Anisocytosis 07/17/2023 2+ (A)     Macrocytosis 07/17/2023 2+ (A)     Sodium 07/17/2023 138     Potassium 07/17/2023 3.9     Chloride 07/17/2023 106     Co2 07/17/2023 20     Anion Gap 07/17/2023 12.0     Glucose 07/17/2023 103 (H)     Bun 07/17/2023 9     Creatinine 07/17/2023 0.46 (L)     Calcium 07/17/2023 8.6     AST(SGOT) 07/17/2023 30     ALT(SGPT) 07/17/2023 72 (H)     Alkaline Phosphatase 07/17/2023 111 (H)     Total Bilirubin 07/17/2023 0.5     Albumin 07/17/2023 4.0     Total Protein 07/17/2023 6.0     Globulin 07/17/2023 2.0     A-G Ratio 07/17/2023 2.0     Direct Bilirubin 07/17/2023 <0.2     Indirect Bilirubin 07/17/2023 see below     Correct Calcium 07/17/2023 8.6     GFR (CKD-EPI) 07/17/2023 152     Bands-Stabs 07/17/2023 0.80     Metamyelocytes 07/17/2023 2.50     Manual Diff Status 07/17/2023 PERFORMED     Comment 07/17/2023 See Comment     Peripheral Smear Review 07/17/2023 see below     Plt Estimation 07/17/2023 Decreased     RBC Morphology 07/17/2023 Present     " Toxic Vacuoles 07/17/2023 Few       Physical Exam:    Constitutional: Well-developed, well-nourished, and in no distress.    HENT: Normocephalic and atraumatic. No nasal congestion or rhinorrhea. Oropharynx is clear and moist. No oral ulcerations or sores.    Eyes: Conjunctivae are normal. Pupils are equal, round, and reactive to light.    Neck: Normal range of motion of neck, no adenopathy.    Cardiovascular: Normal rate, regular rhythm and normal heart sounds.  No murmur heard. DP/radial pulses 2+, cap refill < 2 sec  Pulmonary/Chest: Effort normal and breath sounds normal. No respiratory distress. Symmetric expansion.  No crackles or wheezes.  Abdomen: Soft. Bowel sounds are normal. No distension and no mass. There is no hepatosplenomegaly.    Genitourinary:  Deferred.  Musculoskeletal: Normal range of motion of lower and upper extremities bilaterally. No tenderness to palpation of elbows, wriwts, hands, knees, ankles and feet bilaterally.   Lymphadenopathy: No cervical adenopathy, axillary adenopathy or inguinal adenopathy.   Neurological: Alert and oriented to person and place. Exhibits normal muscle tone bilaterally in upper and lower extremities. Gait normal. Coordination normal.    Skin: Skin is warm, dry and pink.  No rash or evidence of skin infection.  No pallor.   Psychiatric: Mood and affect normal for age.      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 and whose End of Induction IB MRD was both negative by flow cytometry and PCR who presents for start of Maintenance, Cycle 1, Day 57 therapy     2) Ph+ Precursor B-Cell Acute Lymphoblastic Leukemia, in MRD Remission:  - 2-3 weeks of symptoms  - Presenting WBC > 440 k/uL, hyperleukocytosis  - Start of Hydroxyurea (1500 mg PO Q8) 2320 on 5/27/2022  - discontinued after only 55 hours  - No steroid pretreatment  - CNS3c due to cranial nerve  6 palsy  - Testicular status NEGATIVE       - Flow cytometry of both 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, FISH positive for BCR-ABL1 translocation  - Enrolled on [facility] [id number]  - Initially enrolled on [facility] [id number] - but 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                            - Enrolled on [facility] [id number] and began study 6/13/2022              - Started imatinib therapy 6/3/2022   - End of Induction IA and IB MRD negative  - Imatinib dose increased to 400 mg PO AM and 250 mg PM 9/29/2022  -* Note:  All imatinib doses given inpatient rounded down to 600 mg  - Imatinib held for Septic Shock 12/27/2022-1/8/2023  - Imatinib 600 mg PO daily restarted 1/8/2023 inpatient  - Imatinib 400 mg PO QAM and 250 mg PO QHS 1/14/2023-1/17/2023  - Imatinib 600 mg PO QAM 1/17/2023 - 3/30/2023  - Imatinib 600 mg PO QAM 4/11/2023 - PRESENT                - WBC 0.9, Hgb 8.5, platelets 110             - ,                - AST 30, ALT 72, direct bilirubin <0.2                - , platelets 110, no dose-limiting toxicities.  Okay to proceed with start of Maintenance, Cycle 1, Day 57   - Will monitor closely as ANC dropping pretty dramatically, not less than 500 and therefore not requiring any dose adjustments or hold on chemotherapy currently     [place] [id number], AR Arm B, Maintenance, Cycle 1, Day 57:      ** Continue imatinib 600 mg PO daily (no dose adjustments made based on current BSA)  ** Vincristine 2 mg IV x1 dose  ** Continue mercaptopurine 2.5 capsules (125 mg) PO QHS x 6 days and 3 capsules (150 mg) PO QHS x 1 days  ** Continue methotrexate 14 tablets (35 mg = 20 mg/m² per week) to begin next week (held on weeks with IT MTX)  ** Begin prednisone pulse 35 mg PO BID x 5 days      - Return to clinic 4 weeks for Day 1 of Cycle 2 of Maintenance.     2) Transaminitis (MILD):                         - AST 30, ALT 72, total bilirubin 0.5, direct bilirubin  <0.2  - Continue to monitor now taking 6-mercaptopurine and oral methotrexate     3) Chemotherapy Related Pancytopenia:  - WBC 0.9, Hgb 8.5, platelets 110             - ,    - Transfuse for hemoglobin less than 7.5 or symptomatic  - Transfuse for platelets less than 10,000 or symptomatic  - No transfusions necessary today    4) At Risk of Opportunistic Lung Infection:  - Bactrim DS PO BID Sat and Sun for PJP prophylaxis     5) Central Access:   - R Port-A-Cath in place      Research Participant:            [facility] - Source Data         Diagnosis: Ph+ Precursor B-Cell Acute Lymphoblastic Leukemia     Disease Status: EOI1A MRD NEGATIVE, EOI1B RD NEGATIVE, CNS3c, testicular negative, HSV1 IgG POSITIVE, CMV IgG NEGATIVE, VARICELLA IgG POSITIVE     Active Studies: [id number], [id number]                                                                                                      Inactive Studies: [id number]                                                                                                                                                Optional Studies: None             Protocol: International Phase 3 trial in [place] 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      Treatment Plan: [id number](OS), AR-Arm B, Maintenance, Cycle 1, Day 57 (7/17/2023)     Height: 1.667 m      Weight: 62.3kg       BSA: 1.70 m²   (Maintenance, Cycle 1, Day 1 5/22/2023)                                                                                                                                           Performance Status: Karnofsky 90, ECOG 1 (5/22/2023)     Treatment Plan Medications:  (100% adherent with imatinib)     Imatinib 600 mg QAM - re-evaluated BSA on 5/22/2023 - no change in dosing  Mercaptopurine 125 mg PO QHS x 6 days and 150 mg PO QHS x 1  Methotrexate 35 mg PO weekly (on weeks  without LP)  Prednisone 35 mg PO BID x 5 days      Evaluations / Study Labs:  (7/17/2023)     WBC 0.9, Hgb 8.5, platelets 110  ,   AST 30, ALT 72  Total bilirubin 0.5, direct bilirubin <0.2     Therapy Given: (7/117/2023)  Vincristine 2 mg IV     (Oral medications as above)         Disposition: Return to clinic 1 month for Day 1 of Cycle 2 of Maintenance.    Pepe Faye MD  Pediatric Hematology / Oncology  [facility]  Cell.  [phone]  Office. [phone]

## 2023-10-09 ENCOUNTER — HOSPITAL ENCOUNTER (OUTPATIENT)
Dept: INFUSION CENTER | Facility: MEDICAL CENTER | Age: 23
End: 2023-10-09
Attending: PEDIATRICS
Payer: COMMERCIAL

## 2023-10-09 ENCOUNTER — PHARMACY VISIT (OUTPATIENT)
Dept: PHARMACY | Facility: MEDICAL CENTER | Age: 23
End: 2023-10-09
Payer: COMMERCIAL

## 2023-10-09 VITALS
BODY MASS INDEX: 22.52 KG/M2 | SYSTOLIC BLOOD PRESSURE: 119 MMHG | HEART RATE: 110 BPM | DIASTOLIC BLOOD PRESSURE: 78 MMHG | WEIGHT: 135.14 LBS | HEIGHT: 65 IN | RESPIRATION RATE: 20 BRPM | TEMPERATURE: 102.4 F | OXYGEN SATURATION: 96 %

## 2023-10-09 DIAGNOSIS — C91.01 ACUTE LYMPHOBLASTIC LEUKEMIA (ALL) IN REMISSION (HCC): ICD-10-CM

## 2023-10-09 DIAGNOSIS — C91.Z0 B LYMPHOBLASTIC LEUKEMIA WITH T(9;22)(Q34;Q11.2);BCR-ABL1 (HCC): ICD-10-CM

## 2023-10-09 DIAGNOSIS — Z51.11 ENCOUNTER FOR CHEMOTHERAPY MANAGEMENT: ICD-10-CM

## 2023-10-09 DIAGNOSIS — R50.9 FEVER, UNSPECIFIED FEVER CAUSE: ICD-10-CM

## 2023-10-09 DIAGNOSIS — K21.9 GASTROESOPHAGEAL REFLUX DISEASE WITHOUT ESOPHAGITIS: ICD-10-CM

## 2023-10-09 LAB
ALBUMIN SERPL BCP-MCNC: 4.4 G/DL (ref 3.2–4.9)
ALBUMIN/GLOB SERPL: 1.9 G/DL
ALP SERPL-CCNC: 92 U/L (ref 30–99)
ALT SERPL-CCNC: 22 U/L (ref 2–50)
ANION GAP SERPL CALC-SCNC: 11 MMOL/L (ref 7–16)
ANISOCYTOSIS BLD QL SMEAR: ABNORMAL
AST SERPL-CCNC: 21 U/L (ref 12–45)
BASOPHILS # BLD AUTO: 0 % (ref 0–1.8)
BASOPHILS # BLD: 0 K/UL (ref 0–0.12)
BILIRUB CONJ SERPL-MCNC: <0.2 MG/DL (ref 0.1–0.5)
BILIRUB INDIRECT SERPL-MCNC: NORMAL MG/DL (ref 0–1)
BILIRUB SERPL-MCNC: 0.7 MG/DL (ref 0.1–1.5)
BUN SERPL-MCNC: 5 MG/DL (ref 8–22)
CALCIUM ALBUM COR SERPL-MCNC: 8.3 MG/DL (ref 8.5–10.5)
CALCIUM SERPL-MCNC: 8.6 MG/DL (ref 8.5–10.5)
CHLORIDE SERPL-SCNC: 102 MMOL/L (ref 96–112)
CO2 SERPL-SCNC: 24 MMOL/L (ref 20–33)
CREAT SERPL-MCNC: 0.52 MG/DL (ref 0.5–1.4)
EOSINOPHIL # BLD AUTO: 0 K/UL (ref 0–0.51)
EOSINOPHIL NFR BLD: 0 % (ref 0–6.9)
ERYTHROCYTE [DISTWIDTH] IN BLOOD BY AUTOMATED COUNT: 63.7 FL (ref 35.9–50)
GFR SERPLBLD CREATININE-BSD FMLA CKD-EPI: 146 ML/MIN/1.73 M 2
GLOBULIN SER CALC-MCNC: 2.3 G/DL (ref 1.9–3.5)
GLUCOSE SERPL-MCNC: 107 MG/DL (ref 65–99)
HCT VFR BLD AUTO: 28.4 % (ref 42–52)
HGB BLD-MCNC: 9.3 G/DL (ref 14–18)
LYMPHOCYTES # BLD AUTO: 0.02 K/UL (ref 1–4.8)
LYMPHOCYTES NFR BLD: 0.8 % (ref 22–41)
MACROCYTES BLD QL SMEAR: ABNORMAL
MANUAL DIFF BLD: NORMAL
MCH RBC QN AUTO: 36.3 PG (ref 27–33)
MCHC RBC AUTO-ENTMCNC: 32.7 G/DL (ref 32.3–36.5)
MCV RBC AUTO: 110.9 FL (ref 81.4–97.8)
METAMYELOCYTES NFR BLD MANUAL: 0.8 %
MONOCYTES # BLD AUTO: 0.22 K/UL (ref 0–0.85)
MONOCYTES NFR BLD AUTO: 7.5 % (ref 0–13.4)
MORPHOLOGY BLD-IMP: NORMAL
NEUTROPHILS # BLD AUTO: 2.73 K/UL (ref 1.82–7.42)
NEUTROPHILS NFR BLD: 90.9 % (ref 44–72)
NRBC # BLD AUTO: 0 K/UL
NRBC BLD-RTO: 0 /100 WBC (ref 0–0.2)
OVALOCYTES BLD QL SMEAR: NORMAL
PLATELET # BLD AUTO: 192 K/UL (ref 164–446)
PLATELET BLD QL SMEAR: NORMAL
PMV BLD AUTO: 10.3 FL (ref 9–12.9)
POIKILOCYTOSIS BLD QL SMEAR: NORMAL
POTASSIUM SERPL-SCNC: 3.7 MMOL/L (ref 3.6–5.5)
PROT SERPL-MCNC: 6.7 G/DL (ref 6–8.2)
RBC # BLD AUTO: 2.56 M/UL (ref 4.7–6.1)
RBC BLD AUTO: PRESENT
SODIUM SERPL-SCNC: 137 MMOL/L (ref 135–145)
STOMATOCYTES BLD QL SMEAR: NORMAL
WBC # BLD AUTO: 3 K/UL (ref 4.8–10.8)

## 2023-10-09 PROCEDURE — 85025 COMPLETE CBC W/AUTO DIFF WBC: CPT

## 2023-10-09 PROCEDURE — 96375 TX/PRO/DX INJ NEW DRUG ADDON: CPT

## 2023-10-09 PROCEDURE — 96367 TX/PROPH/DG ADDL SEQ IV INF: CPT

## 2023-10-09 PROCEDURE — 36591 DRAW BLOOD OFF VENOUS DEVICE: CPT

## 2023-10-09 PROCEDURE — 700105 HCHG RX REV CODE 258: Mod: UD | Performed by: PEDIATRICS

## 2023-10-09 PROCEDURE — 96409 CHEMO IV PUSH SNGL DRUG: CPT

## 2023-10-09 PROCEDURE — 82248 BILIRUBIN DIRECT: CPT

## 2023-10-09 PROCEDURE — 99214 OFFICE O/P EST MOD 30 MIN: CPT | Performed by: PEDIATRICS

## 2023-10-09 PROCEDURE — 85007 BL SMEAR W/DIFF WBC COUNT: CPT

## 2023-10-09 PROCEDURE — 80053 COMPREHEN METABOLIC PANEL: CPT

## 2023-10-09 PROCEDURE — 700111 HCHG RX REV CODE 636 W/ 250 OVERRIDE (IP): Mod: JZ,UD | Performed by: PEDIATRICS

## 2023-10-09 PROCEDURE — RXMED WILLOW AMBULATORY MEDICATION CHARGE: Performed by: PEDIATRICS

## 2023-10-09 PROCEDURE — A4212 NON CORING NEEDLE OR STYLET: HCPCS

## 2023-10-09 PROCEDURE — 87040 BLOOD CULTURE FOR BACTERIA: CPT

## 2023-10-09 RX ORDER — PREDNISONE 10 MG/1
TABLET ORAL
Qty: 35 TABLET | Refills: 6 | Status: SHIPPED | OUTPATIENT
Start: 2023-10-09 | End: 2023-11-06 | Stop reason: SDUPTHER

## 2023-10-09 RX ORDER — HEPARIN SODIUM,PORCINE 10 UNIT/ML
30 VIAL (ML) INTRAVENOUS PRN
Status: CANCELLED
Start: 2023-10-09

## 2023-10-09 RX ORDER — 0.9 % SODIUM CHLORIDE 0.9 %
20 VIAL (ML) INJECTION PRN
Status: CANCELLED | OUTPATIENT
Start: 2023-10-09

## 2023-10-09 RX ORDER — ONDANSETRON 2 MG/ML
8 INJECTION INTRAMUSCULAR; INTRAVENOUS ONCE
Status: COMPLETED | OUTPATIENT
Start: 2023-10-09 | End: 2023-10-09

## 2023-10-09 RX ORDER — MERCAPTOPURINE 50 MG/1
TABLET ORAL
Qty: 36 TABLET | Refills: 5 | Status: SHIPPED | OUTPATIENT
Start: 2023-10-09 | End: 2023-11-06 | Stop reason: SDUPTHER

## 2023-10-09 RX ORDER — OMEPRAZOLE 20 MG/1
20 CAPSULE, DELAYED RELEASE ORAL DAILY
Qty: 30 CAPSULE | Refills: 5 | Status: SHIPPED | OUTPATIENT
Start: 2023-10-09 | End: 2024-04-06

## 2023-10-09 RX ADMIN — VINCRISTINE SULFATE 2 MG: 1 INJECTION, SOLUTION INTRAVENOUS at 16:18

## 2023-10-09 RX ADMIN — ONDANSETRON 8 MG: 2 INJECTION INTRAMUSCULAR; INTRAVENOUS at 15:41

## 2023-10-09 RX ADMIN — HEPARIN 500 UNITS: 100 SYRINGE at 16:25

## 2023-10-09 RX ADMIN — CEFTRIAXONE SODIUM 2000 MG: 2 INJECTION, POWDER, FOR SOLUTION INTRAMUSCULAR; INTRAVENOUS at 15:42

## 2023-10-09 ASSESSMENT — FIBROSIS 4 INDEX: FIB4 SCORE: 0.41

## 2023-10-09 NOTE — PROGRESS NOTES
Pt to [facility] for lab draw, doctors office visit, and chemotherapy administration.      Temp 101F temporal, 102.5F oral. Other VSS.  Awake and alert in no acute distress, patient expresses fatigue and overall not feeling well, describes it associated with anticipation of clinic visit.     MD notified of fever. Orders for blood cultures obtained.     Port accessed using a 22g 3/4 inch trevino needle with 1 attempt.  Labs drawn from the port without difficulty, including blood cultures .   Pt tolerated well.      Rocephin x1 given per MAR. Pt tolerated well.     Chemotherapy dosage calculated independently by Yue Poole, MONTSERRAT and Dayna Damian RN and compared to road map for protocol [id number].  Calculations within 10% of written order.  Lab results reviewed.      Premedications and chemo given as ordered, see MAR.  Blood return verified prior to, during, and after chemotherapy infusion.  See Chemotherapy flowsheet.  PT tolerated well.  No side effects or complications noted.  Port flushed per orders (see MAR) and de-accessed after completion. PT home with self.  Will return for next visit on 11/06/2023. Instructed to contact on call provider if symptoms worsen, fever does not resolve.

## 2023-10-09 NOTE — PROGRESS NOTES
Pharmacy Chemotherapy Verification    Patient Name: Tomas Jean-Baptiste     Dx: pH+ B-Cell ALL         Protocol: Capizzi MTX  (On Study Arm B, ID number: [id number])         Allergies: Amoxicillin       There were no vitals taken for this visit.   There is no height or weight on file to calculate BSA.    CBC reviewed 10/9/23 - no hold parameters for ANC/Platelets per protocol this treatment day. Direct Bilirubin WNL.     Drug Order   (Drug name, dose, route, IV Fluid & volume, frequency, number of doses) Maintenance, Cycle 2, Day 57   Previous treatment: Maintenance, C2D29 on 9/11/23     Medication = Vincristine (ONCOVIN)   Base Dose= 1.5 mg/m2  Calc Dose: Base Dose x 1.69 m2 = 2.535 mg  Final Dose = 2 mg (MAX)   Route = IV  Fluid & Volume = NS 25 mL  Admin Duration = Over 5 - 10 minutes    Days 1, 29, 57      <10% difference, okay to treat with final max dose   Medication = Methotrexate PF  Base Dose = 12 mg fixed dose  Calc Dose: n/a  Final Dose = 12 mg   Route = INTRATHECAL  Fluid & Volume = pf NS 6 mL  Admin Duration = IT per MD Days 1 and 29  No IT for Day 29 this cycle d/t Brain XRT   No calculation required.   okay to treat with final dose   By my signature below, I confirm this process was performed independently with the BSA and all final chemotherapy dosing calculations congruent. I have reviewed the above chemotherapy order and that my calculation of the final dose and BSA (when applicable) corroborate those calculations of the  pharmacist. Discrepancies of 10% or greater in the written dose have been addressed and documented within the [facility] Progress notes.    Xochilt AlonzoD

## 2023-10-09 NOTE — PROGRESS NOTES
"Pharmacy Chemotherapy Calculations Note:    Dx: B-ALL (CNS3)  Cycle:  2 Maintenance Day 57   Previous treatment: Maint C2D29 on 9/11/23     Protocol: Maintenance per Arm B of [id number] [facility] ID number: [id number]       **JULY did receive cranial radiation, no LP on D29       /78   Pulse (!) 118   Temp (!) 39.2 °C (102.5 °F) (Oral)   Resp 18   Ht 1.659 m (5' 5.32\")   Wt 61.3 kg (135 lb 2.3 oz)   BMI 22.27 kg/m²  Body surface area is 1.68 meters squared.    MD to review any ordered labs       Vincristine 1.5 mg/m² (max 2 mg) x 1.68 m² = 2.52 mg   Max 2 mg, ok for final dose = 2 mg IV      Judy Bryant, PharmD, BCOP                "

## 2023-10-10 ENCOUNTER — TELEPHONE (OUTPATIENT)
Dept: PHARMACY | Facility: MEDICAL CENTER | Age: 23
End: 2023-10-10
Payer: MEDICAID

## 2023-10-10 PROCEDURE — RXMED WILLOW AMBULATORY MEDICATION CHARGE: Performed by: PEDIATRICS

## 2023-10-10 NOTE — PROGRESS NOTES
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 platelets 125.  ANC at the time was measured at only 170.  JULY was otherwise clinically well appearing.  He did receive a one-time normal saline bolus for his nausea and vomiting and was sent home with instructions to HOLD his oral mercaptopurine and oral methotrexate which began being held on 7/28/2023.  Per protocol, imatinib was continued at previous dose of 600 mg in the morning. Tomas Roy would return to clinic again on 8/2/2023 and 8/7/2023.  On both occasions, ANC remained under 500 and oral therapy (with the exception of imatinib) continue to be held while awaiting count recovery.  Ultimately, on 8/11/2023 after 14 days of therapy being held, Tomas Roy returned to clinic and WBC had increased to 2.1 with ANC increasing to 1500 with an absolute monocyte count of 290. Tomas Roy was then instructed to resume  his oral chemotherapy at 100% of prior dosing with (due to timing with Day 1 of Cycle 2 with LP 3 days later, no weekly MTX was administered).  Imatinib was never held.  On 8/14/2023 he was seen in clinic for Day 1 of Cycle 2 of Maintenance with lumbar puncture, vincristine, and 5-day pulse of steroids.  At the time, his ANC was 2010 and his oral chemotherapy was continued at 100% dosing.  Given his history of previously drop in counts, he was scheduled to come back for repeat labs on 8/29/2023 at which point his WBC count was 1.4 and his ANC remained greater than 500 at 1140.  Again, his therapy was continued with imatinib 550 mg by mouth in the morning as well as 100% dosing of methotrexate and 100% dosing of 6-mercaptopurine.  When Tomas Roy returned to clinic on 9/11/2023 for his Maintenance, Cycle 2, Day 29 therapy he was noted to have had another drop in his WBC to 600 and his ANC accordingly was also decreased at 410.  He did receive vincristine in accordance with protocol as well as starting a 5-day pulse of prednisone per protocol.  Given however that his ANC had dropped below 500 his oral methotrexate and oral 6-MP were again held.  He was instructed return to clinic 1 week later for lab evaluations.  On 9/19/2023 he returned to clinic and WBC had improved slightly to 0.8 however ANC remained low at 260 with an absolute monocyte count of 220.  Given that counts not recovered his oral chemotherapy remained held for an additional week.  On 9/26/2023 at 14 days after initially holding chemotherapy, he was seen back in clinic at which time WBC improved again to 1.1 however ANC did not quite recover and remained at 490 with an absolute monocyte count of 330.  Given that 2 weeks had elapsed with myelosuppression, imatinib was held in addition to oral 6-MP and methotrexate. Tomas Roy was instructed to return to clinic on 9/29/2023 for repeat counts.  On 9/29/2023 WBC had improved to 2.4 and ANC had  finally recovered with 1530 and an absolute monocyte count of 510.  Given a recovery with ANC greater than 750 and platelets greater than 75, oral chemotherapy was restarted at 50% of initial dosing and imatinib was restarted at 100% of initial dosing.  Today, Tomas Roy returns to clinic for his Cycle 2, Day 57 therapy.  Interval history is unremarkable.  On arrival however, did Tomas Roy developed fever to 102.5 °F prompting blood cultures to be taken and a dose of ceftriaxone to be given.    On presentation as above, febrile to 101 and then 102.5 °F. Tomas Roy otherwise denies any clinical symptoms or recent or remote signs and symptoms of illness.  His energy and activity have been good.  He reports that he has been attending school full-time and has not had any issues getting to and from class nor keeping up with classes.  He reports that he has not had any headaches, shortness of breath or pallor concerning for anemia.  No concerns for any easy bruising or bleeding. Tomas Roy did not have any low-grade temperatures or fevers prior to his presentation in clinic today.  No complaints of any nausea, vomiting, diarrhea or constipation.  Appetite and oral intake are good.  No complaints of any skin changes or rashes.  No issues with voiding or stooling.  Continues to take oral chemotherapy as prescribed with methotrexate at 50% dosing = 7 tablets weekly and mercaptopurine at 50% dosing = 1.5 tablets x 4 days and 1 tablet x 3 days as well as imatinib 550 mg PO QAM. No other concerns or complaints at this time.    Review of Systems:     Constitutional: Febrile in clinic to 102.5 °F. No remote or acute illness.  Energy and activity are at baseline.    HENT: Negative.  Eyes: Negative for visual changes.  Respiratory: Negative for shortness of breath.  No cough.  No rhinorrhea.  Cardiovascular: Negative.  Gastrointestinal: Negative for nausea, vomiting, abdominal pain, diarrhea,  constipation.  Genitourinary: Negative.  Musculoskeletal: Negative.  Skin: Negative for rash, signs of infection.  Neurological: Negative for numbness, tingling, sensory changes, weakness.  Endo/Heme/Allergies: Does not bruise/bleed easily.    Psychiatric/Behavioral: No changes in mood, appropriate for age.     PAST MEDICAL HISTORY:     Oncologic History:  2-3 week history of worsening fatigue, right lower extremity pain  Presentation to OS and diagnosed with right LE superficial thrombus, subsegmental PE and hyperleukocytosis, anemia and thrombocytopenia  Transferred to [facility] for definitive care  Presenting (local) WBC > 440K, Hgb 10.0, platelets 53, (automated differential ANC 3190, ALC 75,310, absolute monocyte count 63036, absolute blast count 340,560)  Uric Acid 15.6, phosphorus 5.6, LDH 1114  Rasburicase x 1 dose given   Peripheral Blood flow cytometry demonstrating CD10 pos, CD19 pos, CD20 neg, CD22 dim (60%) 5/28/2022  Peripheral blood FISH for BCR-ABL1 positive in 98% of analyzed cells     Age at Diagnosis: 21 years  White Blood Cell Count at Presentation: > 440 k/uL  Testicular Disease Status: Negative (see procedure note 5/30/2022)  CNS Status: 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, diagnostic LP with WBC 1, RBC 3 and no evidence of leukemic blasts 5/30/2022  Steroid Pre-treatment: None  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 for [facility] [place] [facility] purposes:  Flow  cytom  Of the blood pressure little high that is a problem is a cultural problem is well and cultural genetic and everything else like that unfortunately breathalyzers such bad heart disease diabetes things like that  populations etry consistent with peripheral blood, cytogenetics remarkable for known t(9;22)  CSF with WBC 1, RBC 3, no blasts identified on cytospin  FISH results available 5/31/2022 making patient eligible for transfer from [place] [address] to [place] [address] as eligibility requirements were met for [place] [address]  Patient unenrolled from [place] [address] (BCR-ABL1 fusion positive) 6/1/2022  Consent signed for [place] [address] and patient enrolled 6/1/2022 (see eligibility checklist from 5/31/2022 and consent note from 6/1/2022)  Imatinib 400 mg PO QAM / 200 mg PO QPM started 6/3/2022 (allowed per [place] [address])  Patient completed the first 15 days of a Standard 4-drug Induction on 6/13/2022  Start of [place] [address](OS), Induction IA Part 2, Day 15 6/13/2022  End of Induction 1A Part 2 - MRD negative  Start of [place] [address](OS), Induction IA Part 2, Day 15 7/5/2022  Induction IB DELAYED 2 weeks 14 days from 7/26/2022-8/9/2022) for myelosuppression - Start of Day 22 cytarabine block 8/9/2022  Induction IB Day 42 delayed 9 days for myelosuppression - Day 42 evaluations 9/7/2022  End of Induction IB - Flow cytometric MRD negative, MRD by IgH-TCR PCR [id number]%  Randomization to AR-Experimental Arm B of [place] [address]  Start of AR-Experimental Arm B, Interim Maintenance 9/29/2022  Weight based increase in dose of imatinib to 400 mg PO AM and 250 mg PM 9/29/2022  Thrombocytopenia not permissive of proceeding with Day 15 of Interim Maintenance  AR-Experimental Arm B, Interim Maintenance, Day 15 delayed 4 days, start 10/17/2022  AR-Experimental Arm B, Interim Maintenance, Day 29, start 11/1/2022  AR-Experimental Arm B, Interim Maintenance, Day 43, start 11/14/2022  Last does of 6-MP 11/27/2022  AR-Experimental Arm B, Delayed Intensification,  "Day 1, start 12/6/2022  Admission with Severe Septic Shock 12/27/2022  Imatinib HELD 12/27/2022-1/8/2023  AR-Experimental Arm B, Delayed Intensification, Day 29 DELAYED 14 days with start 1/17/2023  Hospitalization 2/7/2023 on Day 50 of Delayed Intensification with left shoulder pain ultimately diagnosed with Bacteroides fragilis infection  AR-Experimental Arm B, Interim Maintenance, Day 1 DELAYED 7 days with start 2/27/2023  AR-Experimental Arm B, Interim Maintenance, Day 11 DELAYED 4 days for platelets 43K on 3/9/2023  AR-Experimental Arm B, Interim Maintenance, Day 11 VCR given and MTX omitted for platelets 43K 3/13/2023  Imatinib HELD 3/30/2023-4/11/2023  AR-Experimental Arm B, Interim Maintenance, Day 21 (DELAYED 22 DAYS) - administered 4/11/2023 with methotrexate 80 mg/m² IV  AR-Experimental Arm B, Interim Maintenance, Day 31 (\"true\" Day 31 4/25/2023) - VCR and IT MTX given 4/28/2023 - IV MTX omitted  AR-Experimental Arm B, Interim Maintenance, Day 41 met with counts - administered 5/5/2023 with methotrexate 80 mg/m² IV     Start of Maintenance, Cycle 1, Day 1 -5/22/2023  Cranial radiation 6/5/2023-6/16/2023 (10 fractions)  Maintenance, Cycle 1, Day 29 - 6/23/2023 (no IT MTX given)  Maintenance, Cycle 1, Day 67, presented with fatigue nausea and vomiting found to have ANC less than 500  Methotrexate (oral) and mercaptopurine held 7/27/2023 - continued with imatinib  Methotrexate (oral) and mercaptopurine held 8/2/2023 - continued with imatinib  Methotrexate (oral) and mercaptopurine held 8/7/2023 - continued with imatinib  Restarted methotrexate (oral) and mercaptopurine at 100% previous dosing on 8/11/2023 - continued with imatinib  Maintenance, Cycle 2, Day 1 - 8/14/2023  Maintenance, Cycle 2, Day 29 - 9/11/2023  Methotrexate (oral) and mercaptopurine held 9/11/2023 - continued with imatinib  Methotrexate (oral) and mercaptopurine held 9/19/2023 - continued with imatinib  Methotrexate (oral) and " mercaptopurine held 9/26/2023 - HELD imatinib  Methotrexate (oral) restarted at 50% dosing and mercaptopurine restarted at 50% dosing 9/29/2023 - RESTARTED imatinib  Maintenance, Cycle 2, Day 57 - 10/9/2023     Past Medical History:    1) Previously Healthy  2) 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  3) Hyperleukocytosis  4) Hyperuricemia  5) Hyperphosphatemia  6) Right Lower Extremity Superficial Thrombus  7) Subsegmental Pulmonary Embolism  8) 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  9) Bacteroides fragilis soft tissue infection left shoulder     Past Surgical History:     1) Temporary Right IJ Pharesis Catheter Placement 5/28/2022  2) Right-sided Port-A-Cath placement 8/29/2022  3) IR drainage left calf hematoma  4) Left shoulder I&D  5) Cranial XRT 6/5/2023-6/16/2023     Birth/Developmental History:   1st of three children  Unremarkable pregnancy  Unremarkable delivery     Allergies:             Allergies as of 05/27/2022 - Reviewed 05/27/2022   Allergen Reaction Noted    Amoxicillin   04/03/2020      Social History:   Lives at home with mother, brother and sister.  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   Two dogs. Father not involved.     Family History:     Family History             Family History   Problem Relation Age of Onset    No Known Problems Mother      Diabetes Paternal Grandfather      Hypertension Paternal Grandfather      Hyperlipidemia Paternal Grandfather      Cancer Neg Hx      Heart Disease Neg Hx      Stroke Neg Hx           No significant family history of cancer.  Both maternal and paternal family history of diabetes.     Immunizations:  UTD    Medications:   Current Outpatient Medications on File Prior to Encounter   Medication Sig Dispense Refill    LORazepam (ATIVAN) 1 MG Tab Take 1 mg by mouth every four hours as needed for Anxiety.      methotrexate 2.5 MG Tab Take 14 Tablets (all at the same time) by mouth every 7 days on weeks when not receiving lumbar puncture (Patient taking differently:  "Take 35 mg by mouth every 7 days. Taking 7 tablets daily) 56 Tablet 5    imatinib (GLEEVEC) 400 MG tablet Take 1 Tablet by mouth every day. Pt takes 200 mg + 400 mg for a total dose of 600 mg daily (Patient taking differently: Take 400 mg by mouth every day. Pt takes 150 mg + 400 mg for a total dose of 550 mg daily) 30 Tablet 5    imatinib (GLEEVEC) 100 MG tablet Take 2 Tablets by mouth every day. Pt takes 200 mg + 400 mg for a total dose of 600 mg daily (Patient taking differently: Take 200 mg by mouth every day. Pt takes 150 mg + 400 mg for a total dose of 550 mg daily) 60 Tablet 5    sulfamethoxazole-trimethoprim (BACTRIM DS) 800-160 MG tablet Take 2 Tablets by mouth twice daily two days a week. (Patient taking differently: Take 1 Tablet by mouth 2 times a day.) 48 Tablet 11    Melatonin 5 MG Cap Take 5 mg by mouth at bedtime as needed.      ondansetron (ZOFRAN ODT) 8 MG TABLET DISPERSIBLE Dissolve 1 Tablet by mouth every 6 hours as needed for Nausea/Vomiting. 10 Tablet 0     No current facility-administered medications on file prior to encounter.       OBJECTIVE:     Vitals:   /78   Pulse (!) 110   Temp (!) 39.1 °C (102.4 °F) (Oral)   Resp 20   Ht 1.659 m (5' 5.32\")   Wt 61.3 kg (135 lb 2.3 oz)   SpO2 96%     Labs:    Hospital Outpatient Visit on 10/09/2023   Component Date Value    WBC 10/09/2023 3.0 (L)     RBC 10/09/2023 2.56 (L)     Hemoglobin 10/09/2023 9.3 (L)     Hematocrit 10/09/2023 28.4 (L)     MCV 10/09/2023 110.9 (H)     MCH 10/09/2023 36.3 (H)     MCHC 10/09/2023 32.7     RDW 10/09/2023 63.7 (H)     Platelet Count 10/09/2023 192     MPV 10/09/2023 10.3     Neutrophils-Polys 10/09/2023 90.90 (H)     Lymphocytes 10/09/2023 0.80 (L)     Monocytes 10/09/2023 7.50     Eosinophils 10/09/2023 0.00     Basophils 10/09/2023 0.00     Nucleated RBC 10/09/2023 0.00     Neutrophils (Absolute) 10/09/2023 2.73     Lymphs (Absolute) 10/09/2023 0.02 (L)     Monos (Absolute) 10/09/2023 0.22     Eos " (Absolute) 10/09/2023 0.00     Baso (Absolute) 10/09/2023 0.00     NRBC (Absolute) 10/09/2023 0.00     Anisocytosis 10/09/2023 1+     Macrocytosis 10/09/2023 1+     Sodium 10/09/2023 137     Potassium 10/09/2023 3.7     Chloride 10/09/2023 102     Co2 10/09/2023 24     Anion Gap 10/09/2023 11.0     Glucose 10/09/2023 107 (H)     Bun 10/09/2023 5 (L)     Creatinine 10/09/2023 0.52     Calcium 10/09/2023 8.6     Correct Calcium 10/09/2023 8.3 (L)     AST(SGOT) 10/09/2023 21     ALT(SGPT) 10/09/2023 22     Alkaline Phosphatase 10/09/2023 92     Total Bilirubin 10/09/2023 0.7     Albumin 10/09/2023 4.4     Total Protein 10/09/2023 6.7     Globulin 10/09/2023 2.3     A-G Ratio 10/09/2023 1.9     Direct Bilirubin 10/09/2023 <0.2     Indirect Bilirubin 10/09/2023 see below     Significant Indicator 10/09/2023 NEG     Source 10/09/2023 BLD     Site 10/09/2023 Port     Culture Result 10/09/2023                      Value:No Growth  Note: Blood cultures are incubated for 5 days and  are monitored continuously.Positive blood cultures  are called to the RN and reported as soon as  they are identified.      GFR (CKD-EPI) 10/09/2023 146     Metamyelocytes 10/09/2023 0.80     Manual Diff Status 10/09/2023 PERFORMED     Peripheral Smear Review 10/09/2023 see below     Plt Estimation 10/09/2023 Normal     RBC Morphology 10/09/2023 Present     Poikilocytosis 10/09/2023 3+     Ovalocytes 10/09/2023 1+     Stomatocytes 10/09/2023 2+       Physical Exam:    Constitutional: Well-developed, well-nourished, and in no distress.  Very well-appearing but somewhat tired appearing, nontoxic.  HENT: Normocephalic and atraumatic.  Hair has regrown no nasal congestion or rhinorrhea. Oropharynx is clear and moist. No oral ulcerations or sores.    Eyes: Conjunctivae are normal. Pupils are equal, round.  EOMI.  Nonicteric.  Neck: Normal range of motion of neck, no adenopathy.    Cardiovascular: Normal rate, regular rhythm and normal heart sounds.  No  murmur heard. DP/radial pulses 2+, cap refill < 2 sec.  Pulmonary/Chest: Effort normal and breath sounds normal. No respiratory distress. Symmetric expansion.  No crackles or wheezes.  Abdomen: Soft. Bowel sounds are normal. No distension and no mass. There is no hepatosplenomegaly.    Genitourinary:  Deferred.  Musculoskeletal: Normal range of motion of lower and upper extremities bilaterally.  Lymphadenopathy: No cervical adenopathy, axillary adenopathy or inguinal adenopathy.   Neurological: Alert and oriented to person and place. Exhibits normal muscle tone bilaterally in upper and lower extremities. Gait normal. Coordination normal.    Skin: Skin is warm, dry and pink.  No rash or evidence of skin infection.  No pallor.   Psychiatric: Mood and affect normal for age.    ASSESSMENT AND PLAN:     Tomas Jean-Baptiste is a previously healthy 23 year old male with  Precursor B-Cell Lymphoblastic Leukemia with BCR-ABL1 fusion and whose End of Induction IB MRD was both negative by flow cytometry and PCR who presents for Maintenance, Cycle 2, Day 57      1) Ph+ Precursor B-Cell Acute Lymphoblastic Leukemia, in MRD Remission:  - 2-3 weeks of symptoms  - Presenting WBC > 440 k/uL, hyperleukocytosis  - Start of Hydroxyurea (1500 mg PO Q8) 2320 on 5/27/2022  - discontinued after only 55 hours  - No steroid pretreatment  - 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  - Testicular status NEGATIVE       - Flow cytometry of both 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, FISH positive for BCR-ABL1 translocation  - Enrolled on [facility] [id number]  - Initially enrolled on [facility] [id number] - but 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                            - Enrolled on [facility] [id number] and began study 6/13/2022              - Started imatinib therapy 6/3/2022   - End of Induction IA and IB MRD negative  - Imatinib dose increased to 400 mg PO AM and 250 mg PM 9/29/2022  -* Note:  All  "imatinib doses given inpatient rounded down to 600 mg  - Imatinib held for Septic Shock 12/27/2022-1/8/2023  - Imatinib 600 mg PO daily restarted 1/8/2023 inpatient  - Imatinib 400 mg PO QAM and 250 mg PO QHS 1/14/2023-1/17/2023  - Imatinib 600 mg PO QAM 1/17/2023 - 3/30/2023  - Imatinib 600 mg PO QAM 4/11/2023 - 8/13/2023  - Imatinib 550 mg PO QAM 8/14/2023 - 9/26/2023  - Imatinib 550 mg PO QAM 9/29/2023 - PRESENT     - Imatinib dosing changed every 12 weeks. Continue same dosing at 550 mg PO daily                - WBC 3.0, Hgb 9.3, platelets 192             - ANC 2730, ALC 20, absolute monocyte count 220                - ANC elevated today (>1500) at 2730.  Will NOT change dose of oral chemotherapy given this is the \"first\" ANC > 1500 (only 11 days between restart of chemotherapy and Day 57 of cycle) If next ANC (Day 1 of Cycle 3) > 1500 will increase oral chemotherapy.  For now, continue at 50% dosing of both MTX and 6MP.    [id number], AR Arm B, Maintenance, Cycle 2, Day 57:      ** Continue imatinib to 550 mg PO daily  ** Vincristine 2 mg IV x 1 dose  ** Begin prednisone 35 mg PO BID x 5 days    ** Begin prednisone 35mg PO QAM and 35 mg PO QHS x 5 days (given current weight 104% of expected dose)           ** Continue MTX 7 tablets PO weekly (given current weight 52.08% of expected dose)           ** Continue 6-mercaptopurine 1.5 tablets PO daily x 4 days and 1 tablets PO daily x 3 days (given current weight 51.02% of expected dose)    - Return to clinic 1 month for start of Cycle 3, sooner if needed        2) Fever:   - Etiology unknown as patient is clinically well without any symptomology   - Febrile to 102.5 °F in clinic   - Blood cultures obtained x 2   - Ceftriaxone 2 g IV x 1 given   - ANC 2730   - Although not neutropenic, will monitor for additional fevers and will follow blood cultures    3) Chemotherapy Related Pancytopenia:    - WBC 3.0, Hgb 9.3, platelets 192           - ANC 2730, ALC 20, absolute " monocyte count 220  - Transfuse for hemoglobin less than 7 gm/dL or symptomatic  - Transfuse for platelets less than 10,000/microliters or symptomatic  - No transfusions necessary today     4) At Risk of Opportunistic Lung Infection:  - Bactrim DS PO BID Sat and Sun for PJP prophylaxis    - Held for myelosuppression     5) Central Access:   - R Port-A-Cath in place      6) Research Participant: ON STUDY [id number], AR Arm B, Maintenance, Cycle 2, Day 57             [facility] - Source Data         Diagnosis: Ph+ Precursor B-Cell Acute Lymphoblastic Leukemia     Disease Status: EOI1A MRD NEGATIVE, EOI1B RD NEGATIVE, CNS3c, testicular negative, HSV1 IgG POSITIVE, CMV IgG NEGATIVE, VARICELLA IgG POSITIVE     Active Studies: [id number], [id number]                                                                                                      Inactive Studies: [id number]                                                                                                                                                Optional Studies: None             Protocol: International Phase 3 trial in [place] 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      Treatment Plan: [id number](OS), AR-Arm B, Maintenance, Cycle 2, Day 57 (10/9/2023)     Height: 1.65 m     Weight: 58.9 kg     BSA: 1.65 m²   (Maintenance, Cycle 2,   Day 29, 9/11/2023)                                                                                                                                           Performance Status: Karnofsky 80, ECOG 1 (8/14/2023)     Treatment Plan Medications:  (100% adherent)     CONTINUE oral 6- mercaptopurine 1.5 tabs x 4 days and 1 tab x 3 days (50% dosing)  CONTINUE oral MTX 7 tabs weekly (50% dosing)  CONTINUE Imatinib 550 mg PO daily     Evaluations / Study Labs:  (10/9/2023)     WBC 3.0, Hgb 9.3, platelets 192  ANC  2730, ALC 20, absolute monocyte count 220     Therapy Given: (10/9/2023)     Oral chemotherapy as above  Vincristine 2 mg IV x 1     Maintenance, Cycle 2 Toxicities:     Grade 4 neutropenia: <500/mm3; <0.5 x 10e9 /L (see above HOLDs on chemotherapy)  Grade 4 lymphopenia: <200/mm3; <0.2 x 10e9 /L  Grade 2 fever 10/9/2023         Disposition: Return to clinic 1 month for Day 1 of Cycle 3 of Maintenance      Pepe Faye MD  Pediatric Hematology / Oncology  [facility]  Cell.  [phone]  [facility]. [phone]

## 2023-10-10 NOTE — TELEPHONE ENCOUNTER
Received Renewal request via [employer]  for mercaptopurine (PURINETHOL) 50 MG Tab   (Quantity:36, Day Supply:28)     Insurance: RX [employer] (primary)  Member ID:  [id number]  BIN: [id number]  PCN: [id number]  Group: [id number]     Insurance: RX [employer] (secondary)  Member ID: [id number]  BIN: [id number]  PCN: [id number]  [id number]    Ran Test claim via [place] & medication Pays for a $0.00 copay. Will outreach to patient to offer specialty pharmacy services and or release to preferred pharmacy    Josh Benito [facility]   Pharmacy Liaison  [phone]  10/10/2023 8:00 AM

## 2023-10-13 ENCOUNTER — PHARMACY VISIT (OUTPATIENT)
Dept: PHARMACY | Facility: MEDICAL CENTER | Age: 23
End: 2023-10-13
Payer: COMMERCIAL

## 2023-10-14 LAB
BACTERIA BLD CULT: NORMAL
SIGNIFICANT IND 70042: NORMAL
SITE SITE: NORMAL
SOURCE SOURCE: NORMAL

## 2023-10-17 ENCOUNTER — OFFICE VISIT (OUTPATIENT)
Dept: PSYCHOLOGY | Facility: MEDICAL CENTER | Age: 23
End: 2023-10-17
Attending: PSYCHOLOGIST
Payer: COMMERCIAL

## 2023-10-17 DIAGNOSIS — C91.Z0 B LYMPHOBLASTIC LEUKEMIA WITH T(9;22)(Q34;Q11.2);BCR-ABL1 (HCC): ICD-10-CM

## 2023-10-17 PROCEDURE — 2023F DILAT RTA XM W/O RTNOPTHY: CPT | Performed by: PSYCHOLOGIST

## 2023-10-17 PROCEDURE — 96158 HLTH BHV IVNTJ INDIV 1ST 30: CPT | Performed by: PSYCHOLOGIST

## 2023-10-17 PROCEDURE — 96159 HLTH BHV IVNTJ INDIV EA ADDL: CPT | Performed by: PSYCHOLOGIST

## 2023-10-17 PROCEDURE — RXMED WILLOW AMBULATORY MEDICATION CHARGE: Performed by: PEDIATRICS

## 2023-10-17 NOTE — PROGRESS NOTES
PEDIATRIC BEHAVIORAL HEALTH VISIT    Name:  Tomas Jean-Baptiste  MRN:  [id number]  :  2000  Age:  23 y.o.  Referring Provider: Dr. Faye (Hem/Onc)  Pediatrician:  Margarito Arvizu M.D.  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 See intake note dated 23    Mental Status Exam:   General description In no apparent distress, well-groomed, appropriately attired, well-nourished, and cooperative with interview  Interactional style Culturally appropriate  Eye contact Normal and appropriate for culture  Speech Unimpaired, fluid and clear, normal rate and rythem  Motor activity Normal motor activity  Orientation Oriented to person time, place and situation  Intellectual functioning Unimpaired  Memory Unimpaired  Attention and concentration Intact and normative concentration  Fund of knowledge Average  Mood Euthymic  Affect Appropriate  Perceptual Disturbances None apparent  Thought Process  No abnormalities apparent       Associations Unimpaired associations       Abstractions Normal abstractions, intact       Insight Insight - adequate and normative       Judgment Judgments - intact and normative   Thought Content  No apparent delusions    Risk Assessment:  Tomas Roy denied current concerns regarding risk to self or others.       Issues Discussed:   This provider met with JULY to continue assisting in rediscovering himself after the trauma of his cancer diagnosis and treatment as well as how childhood impacts this. Today the session was spent processing his thoughts/feelings about agreeing to talk with and see his dad. JULY expressed that it was  helpful being open to the visit and beneficial for the information he learned. His mother and sister do not know he communicated with him. Processed where he is in life now and how relationships are shifting/evolving. Time was also spent discussing his feelings about last 12/27/22 and what he can do to honor that day.     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 After meeting with JULY during his intake, it appears he could benefit from learning anxiety management skills, processing what he has been through, and how to live the life he wants. Tomas Jean-Baptiste could benefit from learning appropriate ways of expressing and coping with his emotions. He could also benefit from learning Cognitive Behavioral Therapy (CBT) and Acceptance and Commitment Therapy (ACT) tools to understand the interaction of thoughts, feelings, and actions. As well as Trauma Focused-CBT to process his cancer journey. Tomas Jean-Baptiste agreed with the plan.       PLAN  JULY will learn and utilize appropriate ways to express and cope with emotions.   He will learn the connection between thoughts, feelings, and actions utilizing CBT and ACT tools.,   Continued processing the impact of cancer on his life and ways to notice triggers.   We also discussed ways he can make plans for 12/27 and honor the experience and where he is now.   JULY will process how their medical diagnosis is impacting their life.    Next visit we will review ways he lora with emotions, what he has noticed in coming for medical visits now, and begin  exploring his core beliefs.     The above diagnostic impressions, recommendations, and treatment plan were discussed with and agreed upon by Tomas Roy, and his caregivers. Care will be coordinated with Tomas Roy's healthcare team, as appropriate.    Total time spent on encounter was 60 minutes.    Laurie Ortega, PhD  Pediatric Psychologist   Licensed Psychologist, [place] # [id number]  [facility], Behavioral Health

## 2023-10-19 ENCOUNTER — PHARMACY VISIT (OUTPATIENT)
Dept: PHARMACY | Facility: MEDICAL CENTER | Age: 23
End: 2023-10-19
Payer: COMMERCIAL

## 2023-11-03 RX ORDER — PROMETHAZINE HYDROCHLORIDE 6.25 MG/5ML
0.25 SYRUP ORAL EVERY 6 HOURS PRN
Status: CANCELLED | OUTPATIENT
Start: 2024-08-15

## 2023-11-03 RX ORDER — SODIUM CHLORIDE 9 MG/ML
500 INJECTION, SOLUTION INTRAVENOUS CONTINUOUS
Status: CANCELLED | OUTPATIENT
Start: 2024-01-30

## 2023-11-03 RX ORDER — PROMETHAZINE HYDROCHLORIDE 6.25 MG/5ML
0.25 SYRUP ORAL EVERY 6 HOURS PRN
Status: CANCELLED | OUTPATIENT
Start: 2024-07-18

## 2023-11-03 RX ORDER — LORAZEPAM 2 MG/ML
1 CONCENTRATE ORAL EVERY 6 HOURS PRN
Status: CANCELLED | OUTPATIENT
Start: 2024-09-12

## 2023-11-03 RX ORDER — ONDANSETRON 2 MG/ML
8 INJECTION INTRAMUSCULAR; INTRAVENOUS EVERY 8 HOURS PRN
Status: CANCELLED | OUTPATIENT
Start: 2024-08-15

## 2023-11-03 RX ORDER — LORAZEPAM 2 MG/ML
1 CONCENTRATE ORAL EVERY 6 HOURS PRN
Status: CANCELLED | OUTPATIENT
Start: 2024-08-15

## 2023-11-03 RX ORDER — SODIUM CHLORIDE 9 MG/ML
500 INJECTION, SOLUTION INTRAVENOUS CONTINUOUS
OUTPATIENT
Start: 2024-04-24

## 2023-11-03 RX ORDER — ONDANSETRON 2 MG/ML
8 INJECTION INTRAMUSCULAR; INTRAVENOUS ONCE
Status: CANCELLED | OUTPATIENT
Start: 2024-07-18

## 2023-11-03 RX ORDER — PROMETHAZINE HYDROCHLORIDE 6.25 MG/5ML
0.25 SYRUP ORAL EVERY 6 HOURS PRN
Status: CANCELLED | OUTPATIENT
Start: 2024-04-24

## 2023-11-03 RX ORDER — ONDANSETRON 2 MG/ML
8 INJECTION INTRAMUSCULAR; INTRAVENOUS ONCE
Status: CANCELLED | OUTPATIENT
Start: 2024-03-26

## 2023-11-03 RX ORDER — LORAZEPAM 2 MG/ML
1 CONCENTRATE ORAL EVERY 6 HOURS PRN
Status: CANCELLED | OUTPATIENT
Start: 2023-11-06

## 2023-11-03 RX ORDER — ONDANSETRON 2 MG/ML
8 INJECTION INTRAMUSCULAR; INTRAVENOUS EVERY 8 HOURS PRN
Status: CANCELLED | OUTPATIENT
Start: 2024-07-18

## 2023-11-03 RX ORDER — LIDOCAINE AND PRILOCAINE 25; 25 MG/G; MG/G
CREAM TOPICAL PRN
Status: CANCELLED | OUTPATIENT
Start: 2024-01-02

## 2023-11-03 RX ORDER — LIDOCAINE AND PRILOCAINE 25; 25 MG/G; MG/G
CREAM TOPICAL PRN
OUTPATIENT
Start: 2024-04-24

## 2023-11-03 RX ORDER — PROMETHAZINE HYDROCHLORIDE 6.25 MG/5ML
0.25 SYRUP ORAL EVERY 6 HOURS PRN
Status: CANCELLED | OUTPATIENT
Start: 2024-09-12

## 2023-11-03 RX ORDER — LIDOCAINE AND PRILOCAINE 25; 25 MG/G; MG/G
CREAM TOPICAL PRN
Status: CANCELLED | OUTPATIENT
Start: 2024-07-18

## 2023-11-03 RX ORDER — LIDOCAINE AND PRILOCAINE 25; 25 MG/G; MG/G
CREAM TOPICAL PRN
Status: CANCELLED | OUTPATIENT
Start: 2024-06-19

## 2023-11-03 RX ORDER — ONDANSETRON 2 MG/ML
8 INJECTION INTRAMUSCULAR; INTRAVENOUS EVERY 8 HOURS PRN
Status: CANCELLED | OUTPATIENT
Start: 2023-11-06

## 2023-11-03 RX ORDER — ONDANSETRON 2 MG/ML
8 INJECTION INTRAMUSCULAR; INTRAVENOUS ONCE
Status: CANCELLED | OUTPATIENT
Start: 2023-12-04

## 2023-11-03 RX ORDER — ONDANSETRON 2 MG/ML
8 INJECTION INTRAMUSCULAR; INTRAVENOUS EVERY 8 HOURS PRN
Status: CANCELLED | OUTPATIENT
Start: 2024-01-01

## 2023-11-03 RX ORDER — LIDOCAINE AND PRILOCAINE 25; 25 MG/G; MG/G
CREAM TOPICAL PRN
Status: CANCELLED | OUTPATIENT
Start: 2023-11-06

## 2023-11-03 RX ORDER — LIDOCAINE AND PRILOCAINE 25; 25 MG/G; MG/G
CREAM TOPICAL PRN
Status: CANCELLED | OUTPATIENT
Start: 2024-01-30

## 2023-11-03 RX ORDER — LIDOCAINE AND PRILOCAINE 25; 25 MG/G; MG/G
CREAM TOPICAL PRN
Status: CANCELLED | OUTPATIENT
Start: 2023-12-04

## 2023-11-03 RX ORDER — SODIUM CHLORIDE 9 MG/ML
500 INJECTION, SOLUTION INTRAVENOUS CONTINUOUS
Status: CANCELLED | OUTPATIENT
Start: 2024-03-25

## 2023-11-03 RX ORDER — ONDANSETRON 2 MG/ML
8 INJECTION INTRAMUSCULAR; INTRAVENOUS ONCE
Status: CANCELLED | OUTPATIENT
Start: 2024-08-15

## 2023-11-03 RX ORDER — SODIUM CHLORIDE 9 MG/ML
500 INJECTION, SOLUTION INTRAVENOUS CONTINUOUS
Status: CANCELLED | OUTPATIENT
Start: 2024-01-02

## 2023-11-03 RX ORDER — ONDANSETRON 2 MG/ML
8 INJECTION INTRAMUSCULAR; INTRAVENOUS EVERY 8 HOURS PRN
Status: CANCELLED | OUTPATIENT
Start: 2023-12-04

## 2023-11-03 RX ORDER — PROMETHAZINE HYDROCHLORIDE 6.25 MG/5ML
0.25 SYRUP ORAL EVERY 6 HOURS PRN
Status: CANCELLED | OUTPATIENT
Start: 2023-11-06

## 2023-11-03 RX ORDER — SODIUM CHLORIDE 9 MG/ML
500 INJECTION, SOLUTION INTRAVENOUS CONTINUOUS
Status: CANCELLED | OUTPATIENT
Start: 2024-09-12

## 2023-11-03 RX ORDER — LORAZEPAM 2 MG/ML
1 CONCENTRATE ORAL EVERY 6 HOURS PRN
Status: CANCELLED | OUTPATIENT
Start: 2024-02-27

## 2023-11-03 RX ORDER — ONDANSETRON 2 MG/ML
8 INJECTION INTRAMUSCULAR; INTRAVENOUS ONCE
Status: CANCELLED | OUTPATIENT
Start: 2024-06-19

## 2023-11-03 RX ORDER — SODIUM CHLORIDE 9 MG/ML
500 INJECTION, SOLUTION INTRAVENOUS CONTINUOUS
Status: CANCELLED | OUTPATIENT
Start: 2024-08-15

## 2023-11-03 RX ORDER — PROMETHAZINE HYDROCHLORIDE 6.25 MG/5ML
0.25 SYRUP ORAL EVERY 6 HOURS PRN
Status: CANCELLED | OUTPATIENT
Start: 2024-05-22

## 2023-11-03 RX ORDER — LIDOCAINE AND PRILOCAINE 25; 25 MG/G; MG/G
CREAM TOPICAL PRN
Status: CANCELLED | OUTPATIENT
Start: 2024-03-25

## 2023-11-03 RX ORDER — ONDANSETRON 2 MG/ML
8 INJECTION INTRAMUSCULAR; INTRAVENOUS ONCE
Status: CANCELLED | OUTPATIENT
Start: 2024-05-22

## 2023-11-03 RX ORDER — ONDANSETRON 2 MG/ML
8 INJECTION INTRAMUSCULAR; INTRAVENOUS EVERY 8 HOURS PRN
Status: CANCELLED | OUTPATIENT
Start: 2024-01-30

## 2023-11-03 RX ORDER — LORAZEPAM 2 MG/ML
1 CONCENTRATE ORAL EVERY 6 HOURS PRN
Status: CANCELLED | OUTPATIENT
Start: 2024-03-25

## 2023-11-03 RX ORDER — LORAZEPAM 2 MG/ML
1 CONCENTRATE ORAL EVERY 6 HOURS PRN
OUTPATIENT
Start: 2024-04-24

## 2023-11-03 RX ORDER — ONDANSETRON 2 MG/ML
8 INJECTION INTRAMUSCULAR; INTRAVENOUS ONCE
Status: CANCELLED | OUTPATIENT
Start: 2024-02-27

## 2023-11-03 RX ORDER — PROMETHAZINE HYDROCHLORIDE 6.25 MG/5ML
0.25 SYRUP ORAL EVERY 6 HOURS PRN
Status: CANCELLED | OUTPATIENT
Start: 2024-01-30

## 2023-11-03 RX ORDER — LORAZEPAM 2 MG/ML
1 CONCENTRATE ORAL EVERY 6 HOURS PRN
Status: CANCELLED | OUTPATIENT
Start: 2024-06-19

## 2023-11-03 RX ORDER — ONDANSETRON 2 MG/ML
8 INJECTION INTRAMUSCULAR; INTRAVENOUS EVERY 8 HOURS PRN
Status: CANCELLED | OUTPATIENT
Start: 2024-03-26

## 2023-11-03 RX ORDER — LIDOCAINE AND PRILOCAINE 25; 25 MG/G; MG/G
CREAM TOPICAL PRN
Status: CANCELLED | OUTPATIENT
Start: 2024-08-15

## 2023-11-03 RX ORDER — ONDANSETRON 2 MG/ML
8 INJECTION INTRAMUSCULAR; INTRAVENOUS EVERY 8 HOURS PRN
Status: CANCELLED | OUTPATIENT
Start: 2024-04-24

## 2023-11-03 RX ORDER — SODIUM CHLORIDE 9 MG/ML
500 INJECTION, SOLUTION INTRAVENOUS CONTINUOUS
Status: CANCELLED | OUTPATIENT
Start: 2023-12-04

## 2023-11-03 RX ORDER — LORAZEPAM 2 MG/ML
1 CONCENTRATE ORAL EVERY 6 HOURS PRN
OUTPATIENT
Start: 2024-05-22

## 2023-11-03 RX ORDER — SODIUM CHLORIDE 9 MG/ML
500 INJECTION, SOLUTION INTRAVENOUS CONTINUOUS
Status: CANCELLED | OUTPATIENT
Start: 2024-06-19

## 2023-11-03 RX ORDER — ONDANSETRON 2 MG/ML
8 INJECTION INTRAMUSCULAR; INTRAVENOUS ONCE
Status: CANCELLED | OUTPATIENT
Start: 2024-01-01

## 2023-11-03 RX ORDER — ONDANSETRON 2 MG/ML
8 INJECTION INTRAMUSCULAR; INTRAVENOUS EVERY 8 HOURS PRN
Status: CANCELLED | OUTPATIENT
Start: 2024-06-19

## 2023-11-03 RX ORDER — ONDANSETRON 2 MG/ML
8 INJECTION INTRAMUSCULAR; INTRAVENOUS EVERY 8 HOURS PRN
Status: CANCELLED | OUTPATIENT
Start: 2024-05-22

## 2023-11-03 RX ORDER — LORAZEPAM 2 MG/ML
1 CONCENTRATE ORAL EVERY 6 HOURS PRN
Status: CANCELLED | OUTPATIENT
Start: 2023-12-04

## 2023-11-03 RX ORDER — ONDANSETRON 2 MG/ML
8 INJECTION INTRAMUSCULAR; INTRAVENOUS EVERY 8 HOURS PRN
Status: CANCELLED | OUTPATIENT
Start: 2024-02-27

## 2023-11-03 RX ORDER — ONDANSETRON 2 MG/ML
8 INJECTION INTRAMUSCULAR; INTRAVENOUS ONCE
Status: CANCELLED | OUTPATIENT
Start: 2024-04-24

## 2023-11-03 RX ORDER — ONDANSETRON 2 MG/ML
8 INJECTION INTRAMUSCULAR; INTRAVENOUS ONCE
Status: CANCELLED | OUTPATIENT
Start: 2024-01-30

## 2023-11-03 RX ORDER — LIDOCAINE AND PRILOCAINE 25; 25 MG/G; MG/G
CREAM TOPICAL PRN
Status: CANCELLED | OUTPATIENT
Start: 2024-02-27

## 2023-11-03 RX ORDER — PROMETHAZINE HYDROCHLORIDE 6.25 MG/5ML
0.25 SYRUP ORAL EVERY 6 HOURS PRN
Status: CANCELLED | OUTPATIENT
Start: 2024-02-27

## 2023-11-03 RX ORDER — ONDANSETRON 2 MG/ML
8 INJECTION INTRAMUSCULAR; INTRAVENOUS ONCE
Status: CANCELLED | OUTPATIENT
Start: 2023-11-06

## 2023-11-03 RX ORDER — LORAZEPAM 2 MG/ML
1 CONCENTRATE ORAL EVERY 6 HOURS PRN
Status: CANCELLED | OUTPATIENT
Start: 2024-01-02

## 2023-11-03 RX ORDER — PROMETHAZINE HYDROCHLORIDE 6.25 MG/5ML
0.25 SYRUP ORAL EVERY 6 HOURS PRN
Status: CANCELLED | OUTPATIENT
Start: 2024-06-19

## 2023-11-03 RX ORDER — SODIUM CHLORIDE 9 MG/ML
500 INJECTION, SOLUTION INTRAVENOUS CONTINUOUS
Status: CANCELLED | OUTPATIENT
Start: 2024-02-27

## 2023-11-03 RX ORDER — SODIUM CHLORIDE 9 MG/ML
500 INJECTION, SOLUTION INTRAVENOUS CONTINUOUS
Status: CANCELLED | OUTPATIENT
Start: 2024-07-18

## 2023-11-03 RX ORDER — ONDANSETRON 2 MG/ML
8 INJECTION INTRAMUSCULAR; INTRAVENOUS EVERY 8 HOURS PRN
Status: CANCELLED | OUTPATIENT
Start: 2024-09-12

## 2023-11-03 RX ORDER — LORAZEPAM 2 MG/ML
1 CONCENTRATE ORAL EVERY 6 HOURS PRN
Status: CANCELLED | OUTPATIENT
Start: 2024-01-30

## 2023-11-03 RX ORDER — ONDANSETRON 2 MG/ML
8 INJECTION INTRAMUSCULAR; INTRAVENOUS ONCE
Status: CANCELLED | OUTPATIENT
Start: 2024-09-12

## 2023-11-03 RX ORDER — PROMETHAZINE HYDROCHLORIDE 6.25 MG/5ML
0.25 SYRUP ORAL EVERY 6 HOURS PRN
Status: CANCELLED | OUTPATIENT
Start: 2023-12-04

## 2023-11-03 RX ORDER — SODIUM CHLORIDE 9 MG/ML
500 INJECTION, SOLUTION INTRAVENOUS CONTINUOUS
OUTPATIENT
Start: 2024-05-22

## 2023-11-03 RX ORDER — LIDOCAINE AND PRILOCAINE 25; 25 MG/G; MG/G
CREAM TOPICAL PRN
Status: CANCELLED | OUTPATIENT
Start: 2024-09-12

## 2023-11-03 RX ORDER — PROMETHAZINE HYDROCHLORIDE 6.25 MG/5ML
0.25 SYRUP ORAL EVERY 6 HOURS PRN
Status: CANCELLED | OUTPATIENT
Start: 2024-01-01

## 2023-11-03 RX ORDER — PROMETHAZINE HYDROCHLORIDE 6.25 MG/5ML
0.25 SYRUP ORAL EVERY 6 HOURS PRN
Status: CANCELLED | OUTPATIENT
Start: 2024-03-26

## 2023-11-03 RX ORDER — LIDOCAINE AND PRILOCAINE 25; 25 MG/G; MG/G
CREAM TOPICAL PRN
OUTPATIENT
Start: 2024-05-22

## 2023-11-03 RX ORDER — LORAZEPAM 2 MG/ML
1 CONCENTRATE ORAL EVERY 6 HOURS PRN
Status: CANCELLED | OUTPATIENT
Start: 2024-07-18

## 2023-11-03 RX ORDER — SODIUM CHLORIDE 9 MG/ML
500 INJECTION, SOLUTION INTRAVENOUS CONTINUOUS
Status: CANCELLED | OUTPATIENT
Start: 2023-11-06

## 2023-11-06 ENCOUNTER — APPOINTMENT (OUTPATIENT)
Dept: PSYCHOLOGY | Facility: MEDICAL CENTER | Age: 23
End: 2023-11-06
Attending: PSYCHOLOGIST
Payer: MEDICAID

## 2023-11-06 ENCOUNTER — PHARMACY VISIT (OUTPATIENT)
Dept: PHARMACY | Facility: MEDICAL CENTER | Age: 23
End: 2023-11-06
Payer: COMMERCIAL

## 2023-11-06 ENCOUNTER — HOSPITAL ENCOUNTER (OUTPATIENT)
Dept: INFUSION CENTER | Facility: MEDICAL CENTER | Age: 23
End: 2023-11-06
Attending: PEDIATRICS
Payer: COMMERCIAL

## 2023-11-06 VITALS
HEART RATE: 89 BPM | HEIGHT: 65 IN | OXYGEN SATURATION: 98 % | RESPIRATION RATE: 20 BRPM | WEIGHT: 145.72 LBS | DIASTOLIC BLOOD PRESSURE: 81 MMHG | SYSTOLIC BLOOD PRESSURE: 114 MMHG | TEMPERATURE: 99 F | BODY MASS INDEX: 24.28 KG/M2

## 2023-11-06 DIAGNOSIS — Z51.11 ENCOUNTER FOR CHEMOTHERAPY MANAGEMENT: ICD-10-CM

## 2023-11-06 DIAGNOSIS — C91.01 ACUTE LYMPHOBLASTIC LEUKEMIA (ALL) IN REMISSION (HCC): ICD-10-CM

## 2023-11-06 DIAGNOSIS — C91.Z0 B LYMPHOBLASTIC LEUKEMIA WITH T(9;22)(Q34;Q11.2);BCR-ABL1 (HCC): ICD-10-CM

## 2023-11-06 LAB
ALBUMIN SERPL BCP-MCNC: 4 G/DL (ref 3.2–4.9)
ALBUMIN/GLOB SERPL: 1.7 G/DL
ALP SERPL-CCNC: 94 U/L (ref 30–99)
ALT SERPL-CCNC: 22 U/L (ref 2–50)
ANION GAP SERPL CALC-SCNC: 10 MMOL/L (ref 7–16)
AST SERPL-CCNC: 19 U/L (ref 12–45)
BASOPHILS # BLD AUTO: 0.3 % (ref 0–1.8)
BASOPHILS # BLD: 0.01 K/UL (ref 0–0.12)
BILIRUB CONJ SERPL-MCNC: <0.2 MG/DL (ref 0.1–0.5)
BILIRUB INDIRECT SERPL-MCNC: NORMAL MG/DL (ref 0–1)
BILIRUB SERPL-MCNC: 0.3 MG/DL (ref 0.1–1.5)
BUN SERPL-MCNC: 6 MG/DL (ref 8–22)
BURR CELLS/RBC NFR CSF MANUAL: 0 %
CALCIUM ALBUM COR SERPL-MCNC: 8.4 MG/DL (ref 8.5–10.5)
CALCIUM SERPL-MCNC: 8.4 MG/DL (ref 8.5–10.5)
CHLORIDE SERPL-SCNC: 109 MMOL/L (ref 96–112)
CLARITY CSF: CLEAR
CO2 SERPL-SCNC: 23 MMOL/L (ref 20–33)
COLOR CSF: COLORLESS
COLOR SPUN CSF: COLORLESS
CREAT SERPL-MCNC: 0.46 MG/DL (ref 0.5–1.4)
CYTOLOGY REG CYTOL: NORMAL
EOSINOPHIL # BLD AUTO: 0.09 K/UL (ref 0–0.51)
EOSINOPHIL NFR BLD: 2.8 % (ref 0–6.9)
ERYTHROCYTE [DISTWIDTH] IN BLOOD BY AUTOMATED COUNT: 64.6 FL (ref 35.9–50)
GFR SERPLBLD CREATININE-BSD FMLA CKD-EPI: 152 ML/MIN/1.73 M 2
GLOBULIN SER CALC-MCNC: 2.4 G/DL (ref 1.9–3.5)
GLUCOSE SERPL-MCNC: 124 MG/DL (ref 65–99)
HCT VFR BLD AUTO: 33.1 % (ref 42–52)
HGB BLD-MCNC: 10.8 G/DL (ref 14–18)
IMM GRANULOCYTES # BLD AUTO: 0.05 K/UL (ref 0–0.11)
IMM GRANULOCYTES NFR BLD AUTO: 1.5 % (ref 0–0.9)
LYMPHOCYTES # BLD AUTO: 0.23 K/UL (ref 1–4.8)
LYMPHOCYTES NFR BLD: 7.1 % (ref 22–41)
LYMPHOCYTES NFR CSF: 88 %
MCH RBC QN AUTO: 35.6 PG (ref 27–33)
MCHC RBC AUTO-ENTMCNC: 32.6 G/DL (ref 32.3–36.5)
MCV RBC AUTO: 109.2 FL (ref 81.4–97.8)
MONOCYTES # BLD AUTO: 0.23 K/UL (ref 0–0.85)
MONOCYTES NFR BLD AUTO: 7.1 % (ref 0–13.4)
MONOS+MACROS NFR CSF MANUAL: 12 %
NEUTROPHILS # BLD AUTO: 2.64 K/UL (ref 1.82–7.42)
NEUTROPHILS NFR BLD: 81.2 % (ref 44–72)
NEUTROPHILS NFR CSF: 0 %
NRBC # BLD AUTO: 0 K/UL
NRBC BLD-RTO: 0 /100 WBC (ref 0–0.2)
NUC CELL # CSF: 1 CELLS/UL (ref 0–10)
PLATELET # BLD AUTO: 147 K/UL (ref 164–446)
PMV BLD AUTO: 9.2 FL (ref 9–12.9)
POTASSIUM SERPL-SCNC: 3.5 MMOL/L (ref 3.6–5.5)
PROT SERPL-MCNC: 6.4 G/DL (ref 6–8.2)
RBC # BLD AUTO: 3.03 M/UL (ref 4.7–6.1)
RBC # CSF: <1 CELLS/UL
SODIUM SERPL-SCNC: 142 MMOL/L (ref 135–145)
SPECIMEN VOL CSF: 2 ML
TUBE # CSF: 2
TUBE # CSF: NORMAL
WBC # BLD AUTO: 3.3 K/UL (ref 4.8–10.8)

## 2023-11-06 PROCEDURE — 96450 CHEMOTHERAPY INTO CNS: CPT

## 2023-11-06 PROCEDURE — 36591 DRAW BLOOD OFF VENOUS DEVICE: CPT

## 2023-11-06 PROCEDURE — 96409 CHEMO IV PUSH SNGL DRUG: CPT

## 2023-11-06 PROCEDURE — 88108 CYTOPATH CONCENTRATE TECH: CPT

## 2023-11-06 PROCEDURE — 700111 HCHG RX REV CODE 636 W/ 250 OVERRIDE (IP): Mod: JZ,UD | Performed by: PEDIATRICS

## 2023-11-06 PROCEDURE — 96375 TX/PRO/DX INJ NEW DRUG ADDON: CPT | Mod: XU

## 2023-11-06 PROCEDURE — 96450 CHEMOTHERAPY INTO CNS: CPT | Performed by: PEDIATRICS

## 2023-11-06 PROCEDURE — RXMED WILLOW AMBULATORY MEDICATION CHARGE: Performed by: PEDIATRICS

## 2023-11-06 PROCEDURE — 85025 COMPLETE CBC W/AUTO DIFF WBC: CPT

## 2023-11-06 PROCEDURE — 89051 BODY FLUID CELL COUNT: CPT

## 2023-11-06 PROCEDURE — 82248 BILIRUBIN DIRECT: CPT

## 2023-11-06 PROCEDURE — 80053 COMPREHEN METABOLIC PANEL: CPT

## 2023-11-06 PROCEDURE — 700105 HCHG RX REV CODE 258: Mod: UD | Performed by: PEDIATRICS

## 2023-11-06 PROCEDURE — 99214 OFFICE O/P EST MOD 30 MIN: CPT | Mod: 25 | Performed by: PEDIATRICS

## 2023-11-06 PROCEDURE — A4212 NON CORING NEEDLE OR STYLET: HCPCS

## 2023-11-06 PROCEDURE — 700101 HCHG RX REV CODE 250: Mod: UD | Performed by: PEDIATRICS

## 2023-11-06 RX ORDER — PROMETHAZINE HYDROCHLORIDE 6.25 MG/5ML
0.25 SYRUP ORAL EVERY 6 HOURS PRN
Status: CANCELLED | OUTPATIENT
Start: 2023-12-04

## 2023-11-06 RX ORDER — MERCAPTOPURINE 50 MG/1
TABLET ORAL
Qty: 64 TABLET | Refills: 5 | Status: SHIPPED | OUTPATIENT
Start: 2023-11-06 | End: 2024-01-02 | Stop reason: SDUPTHER

## 2023-11-06 RX ORDER — ONDANSETRON 2 MG/ML
8 INJECTION INTRAMUSCULAR; INTRAVENOUS ONCE
Status: CANCELLED | OUTPATIENT
Start: 2023-12-04

## 2023-11-06 RX ORDER — METHOTREXATE 2.5 MG/1
26.25 TABLET ORAL
Qty: 42 TABLET | Refills: 0 | Status: SHIPPED | OUTPATIENT
Start: 2023-11-06 | End: 2023-12-11 | Stop reason: SDUPTHER

## 2023-11-06 RX ORDER — ONDANSETRON 2 MG/ML
8 INJECTION INTRAMUSCULAR; INTRAVENOUS EVERY 8 HOURS PRN
Status: CANCELLED | OUTPATIENT
Start: 2023-11-06

## 2023-11-06 RX ORDER — ONDANSETRON 2 MG/ML
8 INJECTION INTRAMUSCULAR; INTRAVENOUS ONCE
Status: CANCELLED | OUTPATIENT
Start: 2024-01-01

## 2023-11-06 RX ORDER — IMATINIB MESYLATE 400 MG/1
600 TABLET, FILM COATED ORAL EVERY MORNING
Qty: 45 TABLET | Refills: 3 | Status: SHIPPED | OUTPATIENT
Start: 2023-11-06 | End: 2024-04-07

## 2023-11-06 RX ORDER — PREDNISONE 10 MG/1
TABLET ORAL
Qty: 35 TABLET | Refills: 6 | Status: SHIPPED | OUTPATIENT
Start: 2023-11-06

## 2023-11-06 RX ORDER — PROMETHAZINE HYDROCHLORIDE 6.25 MG/5ML
0.25 SYRUP ORAL EVERY 6 HOURS PRN
Status: CANCELLED | OUTPATIENT
Start: 2024-01-01

## 2023-11-06 RX ORDER — LIDOCAINE AND PRILOCAINE 25; 25 MG/G; MG/G
CREAM TOPICAL PRN
Status: DISCONTINUED | OUTPATIENT
Start: 2023-11-06 | End: 2023-11-07 | Stop reason: HOSPADM

## 2023-11-06 RX ORDER — ONDANSETRON 2 MG/ML
8 INJECTION INTRAMUSCULAR; INTRAVENOUS EVERY 8 HOURS PRN
Status: CANCELLED | OUTPATIENT
Start: 2023-12-04

## 2023-11-06 RX ORDER — 0.9 % SODIUM CHLORIDE 0.9 %
20 VIAL (ML) INJECTION PRN
Status: CANCELLED | OUTPATIENT
Start: 2023-11-06

## 2023-11-06 RX ORDER — HEPARIN SODIUM,PORCINE 10 UNIT/ML
30 VIAL (ML) INTRAVENOUS PRN
Status: CANCELLED
Start: 2023-11-06

## 2023-11-06 RX ORDER — PROMETHAZINE HYDROCHLORIDE 6.25 MG/5ML
0.25 SYRUP ORAL EVERY 6 HOURS PRN
Status: CANCELLED | OUTPATIENT
Start: 2023-11-06

## 2023-11-06 RX ORDER — ONDANSETRON 2 MG/ML
8 INJECTION INTRAMUSCULAR; INTRAVENOUS EVERY 8 HOURS PRN
Status: CANCELLED | OUTPATIENT
Start: 2024-01-01

## 2023-11-06 RX ORDER — ONDANSETRON 2 MG/ML
8 INJECTION INTRAMUSCULAR; INTRAVENOUS ONCE
Status: COMPLETED | OUTPATIENT
Start: 2023-11-06 | End: 2023-11-06

## 2023-11-06 RX ORDER — MIDAZOLAM HYDROCHLORIDE 1 MG/ML
2 INJECTION INTRAMUSCULAR; INTRAVENOUS ONCE
Status: COMPLETED | OUTPATIENT
Start: 2023-11-06 | End: 2023-11-06

## 2023-11-06 RX ADMIN — LIDOCAINE AND PRILOCAINE 1 APPLICATION: 25; 25 CREAM TOPICAL at 13:12

## 2023-11-06 RX ADMIN — HEPARIN 500 UNITS: 100 SYRINGE at 14:30

## 2023-11-06 RX ADMIN — VINCRISTINE SULFATE 2 MG: 1 INJECTION, SOLUTION INTRAVENOUS at 14:21

## 2023-11-06 RX ADMIN — ONDANSETRON 8 MG: 2 INJECTION INTRAMUSCULAR; INTRAVENOUS at 13:42

## 2023-11-06 RX ADMIN — METHOTREXATE 12 MG: 25 INJECTION INTRA-ARTERIAL; INTRAMUSCULAR; INTRATHECAL; INTRAVENOUS at 13:51

## 2023-11-06 RX ADMIN — MIDAZOLAM 2 MG: 1 INJECTION, SOLUTION INTRAMUSCULAR; INTRAVENOUS at 13:42

## 2023-11-06 ASSESSMENT — FIBROSIS 4 INDEX: FIB4 SCORE: 0.51

## 2023-11-06 NOTE — PROGRESS NOTES
"Pharmacy Chemotherapy Verification    Patient Name: Tomas Jean-Baptiste  Dx: pH+ B-Cell ALL         Protocol: Maintenance C3 and subsequent cycles(On Study Arm B, ID number: [id number])         Allergies: Amoxicillin       /80   Pulse (!) 110   Temp 37.3 °C (99.1 °F) (Temporal)   Resp 20   Ht 1.65 m (5' 4.96\")   Wt 66.1 kg (145 lb 11.6 oz)   SpO2 97%   BMI 24.28 kg/m²    Body surface area is 1.74 meters squared.    All lab results 11/6/23 within treatment parameters.     Drug Order   (Drug name, dose, route, IV Fluid & volume, frequency, number of doses) Maintenance, Cycle 3, Day 1  Previous treatment: Maintenance, C2D57 10/9/23   Medication = Vincristine (ONCOVIN)   Base Dose= 1.5 mg/m2  Calc Dose: Base Dose x 1.74m2 = >2mg  Final Dose = 2 mg (MAX)   Route = IV  Fluid & Volume = NS 25 mL  Admin Duration = Over 5 - 10 minutes    Days 1, 29, 57      <10% difference, okay to treat with final max dose   Medication = Methotrexate PF  Base Dose = 12 mg fixed dose  Calc Dose: n/a  Final Dose = 12 mg   Route = INTRATHECAL  Fluid & Volume = pf NS 6 mL  Admin Duration = IT per MD Day 1   No calculation required.   okay to treat with final dose   By my signature below, I confirm this process was performed independently with the BSA and all final chemotherapy dosing calculations congruent. I have reviewed the above chemotherapy order and that my calculation of the final dose and BSA (when applicable) corroborate those calculations of the  pharmacist. Discrepancies of 10% or greater in the written dose have been addressed and documented within the [facility] Progress notes.    Galen Wilson, PharmD  "

## 2023-11-06 NOTE — H&P
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 fatigue.  Blood counts obtained at the time demonstrated a WBC of 0.4, Hgb 8.8 and platelets 125.  ANC at the time was measured at only 170.  JULY was otherwise clinically well appearing.  He did receive a one-time normal saline bolus for his nausea and vomiting and was sent home with instructions to HOLD his oral mercaptopurine and oral methotrexate which began being held on 7/28/2023.  Per protocol, imatinib was continued at previous dose of 600 mg in the morning. Tomas Roy would return to clinic again on 8/2/2023 and 8/7/2023.  On both occasions, ANC remained under 500 and oral therapy (with the exception of imatinib) continue to be held while awaiting count recovery.  Ultimately, on 8/11/2023 after 14 days of therapy being held, Tomas Roy returned to clinic and WBC had increased to 2.1 with ANC increasing to  1500 with an absolute monocyte count of 290. Tomas Roy was then instructed to resume his oral chemotherapy at 100% of prior dosing with (due to timing with Day 1 of Cycle 2 with LP 3 days later, no weekly MTX was administered).  Imatinib was never held.  On 8/14/2023 he was seen in clinic for Day 1 of Cycle 2 of Maintenance with lumbar puncture, vincristine, and 5-day pulse of steroids.  At the time, his ANC was 2010 and his oral chemotherapy was continued at 100% dosing.  Given his history of previously drop in counts, he was scheduled to come back for repeat labs on 8/29/2023 at which point his WBC count was 1.4 and his ANC remained greater than 500 at 1140.  Again, his therapy was continued with imatinib 550 mg by mouth in the morning as well as 100% dosing of methotrexate and 100% dosing of 6-mercaptopurine.  When Tomas Roy returned to clinic on 9/11/2023 for his Maintenance, Cycle 2, Day 29 therapy he was noted to have had another drop in his WBC to 600 and his ANC accordingly was also decreased at 410.  He did receive vincristine in accordance with protocol as well as starting a 5-day pulse of prednisone per protocol.  Given however that his ANC had dropped below 500 his oral methotrexate and oral 6-MP were again held.  He was instructed return to clinic 1 week later for lab evaluations.  On 9/19/2023 he returned to clinic and WBC had improved slightly to 0.8 however ANC remained low at 260 with an absolute monocyte count of 220.  Given that counts not recovered his oral chemotherapy remained held for an additional week.  On 9/26/2023 at 14 days after initially holding chemotherapy, he was seen back in clinic at which time WBC improved again to 1.1 however ANC did not quite recover and remained at 490 with an absolute monocyte count of 330.  Given that 2 weeks had elapsed with myelosuppression, imatinib was held in addition to oral 6-MP and methotrexate. Tomas Roy was instructed to return  to clinic on 9/29/2023 for repeat counts.  On 9/29/2023 WBC had improved to 2.4 and ANC had finally recovered with 1530 and an absolute monocyte count of 510.  Given a recovery with ANC greater than 750 and platelets greater than 75, oral chemotherapy was restarted at 50% of initial dosing and imatinib was restarted at 100% of initial dosing.  On 10/9/2023, Tomas Roy returned to clinic for his Day 57 of Cycle 2 visit.  At the time of his visit, his ANC had recovered after holding chemotherapy and then restarting at 50% dosing 11 days prior.  He did however in clinic spiked a temperature 102.5 °F.  For this reason despite his ANC being improved, no dose adjustments were made in his chemotherapy.  He has since continued with 50% dosing with 100% adherence of his 6-MP and methotrexate.  He has continued with 100% adherence of his imatinib at 550 mg PO QHS.  Interval history is unremarkable.     Tomas Roy reports that he has overall been healthy.  No complaints of any fevers or recent signs and symptoms of acute illness.  Energy and activity are good. Tomas Roy reports that he has been in school full-time and is doing well overall.  He did have an issue with failing a recent exam however he came back for the make-up exam and did well.  He reports that he continues to to be able to study and focus.  No complaints of any headaches, change in vision or neurologic status changes.  No complaints of any cough or congestion. Tomas Roy reports that he has been eating and drinking well without any nausea, vomiting, diarrhea or constipation.  No abdominal discomfort or pain.  Not complaining of any shortness of breath.  No dizziness.  No complaints of any skin changes or rashes.  Reports 100% adherence with his 6-MP which she is taking at 50% of expected dose, 1 tablet x 3 days and 1.5 tablets x 4 days.  He also reports 100% adherence with his methotrexate which she is taking at 50% of expected dosing  at 7 tablets weekly.  Additionally, he reports 100% adherence with his imatinib at 550 mg PO QAM. no other concerns or complaints at this time.    Review of Systems:     Constitutional: Afebrile.  No recent or remote illness.  Energy and activity are baseline.  Eating and drinking well with good appetite and oral intake.  HENT: Negative for auditory changes, nosebleeds and sore throat.  No mouth sores.  Eyes: Negative for visual changes.  Respiratory: Negative for  shortness of breath.  No cough.  Cardiovascular: Negative.  Gastrointestinal: Negative for nausea, vomiting, abdominal pain, diarrhea, constipation.  Genitourinary: Negative.  Musculoskeletal: Negative.  Skin: Negative for rash, signs of infection.  Neurological: Negative for numbness, tingling, sensory changes, weakness or headaches.  Reports improved neuropathy however still present.  Endo/Heme/Allergies: Does not bruise/bleed easily.    Psychiatric/Behavioral: No changes in mood, appropriate for age.     PAST MEDICAL HISTORY:     Oncologic History:  2-3 week history of worsening fatigue, right lower extremity pain  Presentation to [facility] and diagnosed with right LE superficial thrombus, subsegmental PE and hyperleukocytosis, anemia and thrombocytopenia  Transferred to [facility] for definitive care  Presenting (local) WBC > 440K, Hgb 10.0, platelets 53, (automated differential ANC 3190, ALC 75,310, absolute monocyte count 63080, absolute blast count 340,560)  Uric Acid 15.6, phosphorus 5.6, LDH 1114  Rasburicase x 1 dose given   Peripheral Blood flow cytometry demonstrating CD10 pos, CD19 pos, CD20 neg, CD22 dim (60%) 5/28/2022  Peripheral blood FISH for BCR-ABL1 positive in 98% of analyzed cells     Age at Diagnosis: 21 years  White Blood Cell Count at Presentation: > 440 k/uL  Testicular Disease Status: Negative (see procedure note 5/30/2022)  CNS Status: 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, diagnostic LP with WBC 1, RBC 3 and no evidence of  leukemic blasts 5/30/2022  Steroid Pre-treatment: None  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     All inclusion/exclusion criteria for [id number] met and consent signed - enrolled 5/29/2022   All inclusion/exclusion criteria for [id number] met and consent signed - enrolled 5/30/2022  Confirmatory bone marrow aspirate and biopsy and diagnostic LP + cytarabine 70 mg IT 5/30/2022  Induction therapy (ON STUDY [id number]) started 5/30/2022  Bone marrow immunohistochemistry consistent with diagnosis of B-ALL comprising 90% of marrow cellularity  Bone marrow sample sent to [facility] for [facility] purposes:  Flow cytom  Of the blood pressure little high that is a problem is a cultural problem is well and cultural genetic and everything else like that unfortunately breathalyzers such bad heart disease diabetes things like that  populations etry consistent with peripheral blood, cytogenetics remarkable for known t(9;22)  CSF with WBC 1, RBC 3, no blasts identified on cytospin  FISH results available 5/31/2022 making patient eligible for transfer from [place] [address] to [place] [address] as eligibility requirements were met for [place] [address]  Patient unenrolled from [place] [address] (BCR-ABL1 fusion positive) 6/1/2022  Consent signed for [place] [address] and patient enrolled 6/1/2022 (see eligibility checklist from 5/31/2022 and consent note from 6/1/2022)  Imatinib 400 mg PO QAM / 200 mg PO QPM started 6/3/2022 (allowed per [place] [address])  Patient completed the first 15 days of a Standard 4-drug Induction on 6/13/2022  Start of [facility] [id number](OS), Induction IA Part 2, Day 15 6/13/2022  End of Induction 1A Part 2 - MRD negative  Start of [facility] [id number](OS), Induction IA Part 2, Day 15 7/5/2022  Induction IB DELAYED 2 weeks 14 days from 7/26/2022-8/9/2022) for myelosuppression - Start of Day 22 cytarabine block 8/9/2022  Induction IB Day 42 delayed 9 days for myelosuppression - Day 42 evaluations  "9/7/2022  End of Induction IB - Flow cytometric MRD negative, MRD by IgH-TCR PCR [id number]%  Randomization to AR-Experimental Arm B of [id number]  Start of AR-Experimental Arm B, Interim Maintenance 9/29/2022  Weight based increase in dose of imatinib to 400 mg PO AM and 250 mg PM 9/29/2022  Thrombocytopenia not permissive of proceeding with Day 15 of Interim Maintenance  AR-Experimental Arm B, Interim Maintenance, Day 15 delayed 4 days, start 10/17/2022  AR-Experimental Arm B, Interim Maintenance, Day 29, start 11/1/2022  AR-Experimental Arm B, Interim Maintenance, Day 43, start 11/14/2022  Last does of 6-MP 11/27/2022  AR-Experimental Arm B, Delayed Intensification, Day 1, start 12/6/2022  Admission with Severe Septic Shock 12/27/2022  Imatinib HELD 12/27/2022-1/8/2023  AR-Experimental Arm B, Delayed Intensification, Day 29 DELAYED 14 days with start 1/17/2023  Hospitalization 2/7/2023 on Day 50 of Delayed Intensification with left shoulder pain ultimately diagnosed with Bacteroides fragilis infection  AR-Experimental Arm B, Interim Maintenance, Day 1 DELAYED 7 days with start 2/27/2023  AR-Experimental Arm B, Interim Maintenance, Day 11 DELAYED 4 days for platelets 43K on 3/9/2023  AR-Experimental Arm B, Interim Maintenance, Day 11 VCR given and MTX omitted for platelets 43K 3/13/2023  Imatinib HELD 3/30/2023-4/11/2023  AR-Experimental Arm B, Interim Maintenance, Day 21 (DELAYED 22 DAYS) - administered 4/11/2023 with methotrexate 80 mg/m² IV  AR-Experimental Arm B, Interim Maintenance, Day 31 (\"true\" Day 31 4/25/2023) - VCR and IT MTX given 4/28/2023 - IV MTX omitted  AR-Experimental Arm B, Interim Maintenance, Day 41 met with counts - administered 5/5/2023 with methotrexate 80 mg/m² IV     Start of Maintenance, Cycle 1, Day 1 -5/22/2023  Cranial radiation 6/5/2023-6/16/2023 (10 fractions)  Maintenance, Cycle 1, Day 29 - 6/23/2023 (no IT MTX given)  Maintenance, Cycle 1, Day 67, presented with fatigue nausea and " vomiting found to have ANC less than 500  Methotrexate (oral) and mercaptopurine held 7/27/2023 - continued with imatinib  Methotrexate (oral) and mercaptopurine held 8/2/2023 - continued with imatinib  Methotrexate (oral) and mercaptopurine held 8/7/2023 - continued with imatinib  Restarted methotrexate (oral) and mercaptopurine at 100% previous dosing on 8/11/2023 - continued with imatinib  Maintenance, Cycle 2, Day 1 - 8/14/2023  Maintenance, Cycle 2, Day 29 - 9/11/2023  Methotrexate (oral) and mercaptopurine held 9/11/2023 - continued with imatinib  Methotrexate (oral) and mercaptopurine held 9/19/2023 - continued with imatinib  Methotrexate (oral) and mercaptopurine held 9/26/2023 - HELD imatinib  Methotrexate (oral) restarted at 50% dosing and mercaptopurine restarted at 50% dosing 9/29/2023 - RESTARTED imatinib  Maintenance, Cycle 2, Day 57 - 10/9/2023  Maintenance, Cycle 3, Day 1 - 11/6/2023  Methotrexate (oral) increased to 75% dosing and mercaptopurine increased to 75% dosing.  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  3) Hyperleukocytosis  4) Hyperuricemia  5) Hyperphosphatemia  6) Right Lower Extremity Superficial Thrombus  7) Subsegmental Pulmonary Embolism  8) 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  9) Bacteroides fragilis soft tissue infection left shoulder     Past Surgical History:     1) Temporary Right IJ Pharesis Catheter Placement 5/28/2022  2) Right-sided Port-A-Cath placement 8/29/2022  3) IR drainage left calf hematoma  4) Left shoulder I&D  5) Cranial XRT 6/5/2023-6/16/2023     Birth/Developmental History:   1st of three children  Unremarkable pregnancy  Unremarkable delivery     Allergies:             Allergies as of 05/27/2022 - Reviewed 05/27/2022   Allergen Reaction Noted    Amoxicillin   04/03/2020      Social History:   Lives at home with mother, brother and sister.  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   Two dogs. Father not involved.     Family History:     Family History             Family History   Problem Relation Age of Onset    No Known Problems Mother      Diabetes Paternal Grandfather      Hypertension Paternal Grandfather      Hyperlipidemia Paternal Grandfather      Cancer Neg Hx      Heart Disease Neg Hx      Stroke Neg Hx           No significant family history of cancer.  Both maternal and paternal family history of diabetes.     Immunizations:  UTD     Medications:  (SEE BELOW FOR CHANGES TO HOME MEDS)  Current Outpatient Medications on File Prior to Encounter   Medication Sig Dispense Refill    predniSONE (DELTASONE) 10 MG Tab Take 3.5 tablets by mouth in the morning and evening for 5 Days at Day 1, 29 and 57 of each cycle. 35 Tablet 6    mercaptopurine (PURINETHOL) 50 MG Tab Take 1.5 tablets by mouth in the evening x 4 days and 1 tablet by mouth in the evening x 3 day each week. 36 Tablet 5    LORazepam (ATIVAN) 1 MG Tab Take 1 mg by mouth every four hours as needed for Anxiety.      methotrexate 2.5 MG Tab Take 14 Tablets (all at the same time) by mouth every 7 days on weeks when not receiving lumbar puncture (Patient taking differently: Take 35 mg by mouth every 7 days. Taking 7 tablets daily) 56 Tablet 5    imatinib (GLEEVEC) 100 MG tablet Take 2 Tablets by mouth every day. Pt takes 200 mg + 400 mg for a total dose of 600 mg daily (Patient taking differently: Take 200 mg by mouth every day. Pt takes 150 mg + 400 mg for a total dose of 550 mg daily) 60 Tablet 5    sulfamethoxazole-trimethoprim (BACTRIM DS) 800-160 MG tablet Take 2 Tablets by mouth twice daily two days a week. (Patient taking differently: Take 1 Tablet by mouth 2 times a day.) 48 Tablet 11    Melatonin 5 MG Cap Take 5 mg by mouth at bedtime as needed.      ondansetron (ZOFRAN ODT) 8 MG TABLET DISPERSIBLE Dissolve 1 Tablet by mouth every 6 hours as needed for Nausea/Vomiting. 10 Tablet 0    omeprazole (PRILOSEC) 20 MG  "delayed-release capsule Take 1 Capsule by mouth every day for 180 days. 30 Capsule 5    imatinib (GLEEVEC) 400 MG tablet Take 1 Tablet by mouth every day. Pt takes 200 mg + 400 mg for a total dose of 600 mg daily (Patient taking differently: Take 400 mg by mouth every day. Pt takes 150 mg + 400 mg for a total dose of 550 mg daily) 30 Tablet 5     No current facility-administered medications on file prior to encounter.     OBJECTIVE:     Vitals:   /80   Pulse (!) 110   Temp 37.3 °C (99.1 °F) (Temporal)   Resp 20   Ht 1.65 m (5' 4.96\")   Wt 66.1 kg (145 lb 11.6 oz)   SpO2 97%     Labs:    Hospital Outpatient Visit on 11/06/2023   Component Date Value    WBC 11/06/2023 3.3 (L)     RBC 11/06/2023 3.03 (L)     Hemoglobin 11/06/2023 10.8 (L)     Hematocrit 11/06/2023 33.1 (L)     MCV 11/06/2023 109.2 (H)     MCH 11/06/2023 35.6 (H)     MCHC 11/06/2023 32.6     RDW 11/06/2023 64.6 (H)     Platelet Count 11/06/2023 147 (L)     MPV 11/06/2023 9.2     Neutrophils-Polys 11/06/2023 81.20 (H)     Lymphocytes 11/06/2023 7.10 (L)     Monocytes 11/06/2023 7.10     Eosinophils 11/06/2023 2.80     Basophils 11/06/2023 0.30     Immature Granulocytes 11/06/2023 1.50 (H)     Nucleated RBC 11/06/2023 0.00     Neutrophils (Absolute) 11/06/2023 2.64     Lymphs (Absolute) 11/06/2023 0.23 (L)     Monos (Absolute) 11/06/2023 0.23     Eos (Absolute) 11/06/2023 0.09     Baso (Absolute) 11/06/2023 0.01     Immature Granulocytes (a* 11/06/2023 0.05     NRBC (Absolute) 11/06/2023 0.00     Sodium 11/06/2023 142     Potassium 11/06/2023 3.5 (L)     Chloride 11/06/2023 109     Co2 11/06/2023 23     Anion Gap 11/06/2023 10.0     Glucose 11/06/2023 124 (H)     Bun 11/06/2023 6 (L)     Creatinine 11/06/2023 0.46 (L)     Calcium 11/06/2023 8.4 (L)     Correct Calcium 11/06/2023 8.4 (L)     AST(SGOT) 11/06/2023 19     ALT(SGPT) 11/06/2023 22     Alkaline Phosphatase 11/06/2023 94     Total Bilirubin 11/06/2023 0.3     Albumin 11/06/2023 4.0  "    Total Protein 11/06/2023 6.4     Globulin 11/06/2023 2.4     A-G Ratio 11/06/2023 1.7     Direct Bilirubin 11/06/2023 <0.2     Indirect Bilirubin 11/06/2023 see below     GFR (CKD-EPI) 11/06/2023 152         Physical Exam:    Constitutional: Well-developed, well-nourished, and in no distress.  Very well-appearing.  HENT: Normocephalic and atraumatic. No nasal congestion or rhinorrhea. Oropharynx is clear and moist. No oral ulcerations or sores.    Eyes: Conjunctivae are normal. Pupils are equal, round.  EOMI.  Nonicteric.  Neck: Normal range of motion of neck, no adenopathy.    Cardiovascular: Normal rate, regular rhythm and normal heart sounds.  No murmur heard. DP/radial pulses 2+, cap refill < 2 sec.  Pulmonary/Chest: Effort normal and breath sounds normal. No respiratory distress. Symmetric expansion.  No crackles or wheezes.  Abdomen: Soft. Bowel sounds are normal. No distension and no mass. There is no hepatosplenomegaly.    Genitourinary:  Deferred  Musculoskeletal: Normal range of motion of lower and upper extremities bilaterally. No tenderness to palpation of elbows, wrists, hands, knees, ankles and feet bilaterally.   Lymphadenopathy: No cervical adenopathy, axillary adenopathy or inguinal adenopathy.   Neurological: Alert and oriented to person and place. Exhibits normal muscle tone bilaterally in upper and lower extremities. Gait normal. Coordination normal.    Skin: Skin is warm, dry and pink.  No rash or evidence of skin infection.  No pallor.   Psychiatric: Mood and affect normal for age.    ASSESSMENT AND PLAN:     Tomas Jean-Baptiste is a previously healthy 23 year old male with  Precursor B-Cell Lymphoblastic Leukemia with BCR-ABL1 fusion and whose End of Induction IB MRD was both negative by flow cytometry and PCR who presents for Maintenance, Cycle 3, Day 1     1) Ph+ Precursor B-Cell Acute Lymphoblastic Leukemia, in MRD Remission:  - 2-3 weeks of symptoms  - Presenting WBC > 440  k/uL, hyperleukocytosis  - Start of Hydroxyurea (1500 mg PO Q8) 2320 on 5/27/2022  - discontinued after only 55 hours  - No steroid pretreatment  - 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  - Testicular status NEGATIVE       - Flow cytometry of both 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, FISH positive for BCR-ABL1 translocation  - [place] [facility] [id number]  - [place] [facility] [id number] - but 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                            - [place] [facility] [id number] and began study 6/13/2022              - Started imatinib therapy 6/3/2022   - End of Induction IA and IB MRD negative  - Imatinib dose increased to 400 mg PO AM and 250 mg PM 9/29/2022   -* Note:  All imatinib doses given inpatient rounded down to 600 mg   - Imatinib held for Septic Shock 12/27/2022-1/8/2023   - Imatinib 600 mg PO daily restarted 1/8/2023 inpatient   - Imatinib 400 mg PO QAM and 250 mg PO QHS 1/14/2023-1/17/2023   - Imatinib 600 mg PO QAM 1/17/2023 - 3/30/2023   - Imatinib 600 mg PO QAM 4/11/2023 - 8/13/2023   - Imatinib 550 mg PO QAM 8/14/2023 - 9/26/2023   - Imatinib 550 mg PO QAM 9/29/2023 -11/6/2023   - Imatinib 600 mg PO QAM 11/6/2023 - PRESENT     - Imatinib dosing reviewed/changed every 12 weeks. INCREASE imatinib to 600 mg PO QAM for increase in BSA today.                - WBC 3.3, Hgb 10.8, platelets 147             - ANC 2640, , absolute monocyte count 230                - ANC elevated today (>1500) at 2640.  As ANC > 1500 now for more than 4 weeks will increase both 6-MP and MTX to 75% of dose prescribed prior to stopping (i.e. 75% of expected [100%] dose) .       [id number](OS), AR Arm B, Maintenance, Cycle 3, Day 1:   ** Methotrexate 12 mg IT x 1 dose (See separate procedure note)   ** Increase imatinib to 600 mg PO QAM  ** Vincristine 2 mg IV x 1 dose  ** Begin pulse of prednisone 35mg PO QAM and 35 mg PO QHS x 5 days (given current weight  100.57% of expected dose)          ** Increase MTX to 10.5 tablets PO weekly (given current weight 75.43% of expected dose)          ** Increase 6-mercaptopurine 2 tablets PO daily x 7 days (given current weight 76.63% of expected dose)    - Return to clinic 1 month for start of Cycle 3, Day 29    2) Chemotherapy Related Pancytopenia:           - WBC 3.3, Hgb 10.8, platelets 147             - ANC 2640, , absolute monocyte count 230  - Transfuse for hemoglobin less than 7 gm/dL or symptomatic  - Transfuse for platelets less than 10,000/microliters or symptomatic  - No transfusions necessary today     3) At Risk of Opportunistic Lung Infection:  - Bactrim DS PO BID Sat and Sun for PJP prophylaxis     4) Central Access:   - R Port-A-Cath in place      5) Research Participant: ON STUDY [id number], AR Arm B, Maintenance, Cycle 3, Day 1             [facility] - Source Data         Diagnosis: Ph+ Precursor B-Cell Acute Lymphoblastic Leukemia     Disease Status: EOI1A MRD NEGATIVE, EOI1B RD NEGATIVE, CNS3c, testicular negative, HSV1 IgG POSITIVE, CMV IgG NEGATIVE, VARICELLA IgG POSITIVE     Active Studies: [id number], [id number]                                                                                                      Inactive Studies: [id number]                                                                                                                                                Optional Studies: None             Protocol: International Phase 3 trial in [place] 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      Treatment Plan: [id number](OS), AR-Arm B, Maintenance, Cycle 3, Day 1 (11/6/2023)     Height: 1.65 m     Weight: 66.1 kg     BSA: 1.74 m²   (Maintenance, Cycle 3,   Day 1, 11/6/2023)                                                                                                                                            Performance Status: Karnofsky 90, ECOG 1 (11/6/2023)     Treatment Plan Medications:  (100% adherent)     INCREASE oral 6- mercaptopurine 2 tabs x 7 days (76.63% dosing - weight and count based)  INCREASE oral MTX to 10.5 tabs weekly (75.43% dosing - weight and count based)  INCREASE Imatinib to 6000 mg PO QHS (weight based)     Evaluations / Study Labs:  (11/6/2023)     WBC 3.3, Hgb 10.8, platelets 147  ANC 2640, , absolute monocyte count 230    Therapy Given: (11/6/2023)     Oral chemotherapy as above  Vincristine 2 mg IV x 1     Maintenance, Cycle 3 Toxicities:     None        Disposition: Return to clinic 2 weeks for labs and 1 month for Day 29 of Cycle 3 of Maintenance      Pepe Faye MD  Pediatric Hematology / Oncology  [facility]  Cell.  [phone]  Office. [phone]

## 2023-11-06 NOTE — PROCEDURES
Pediatric Oncology Lumbar Puncture  Procedure Note      Patient Name:  Tomas Jean-Baptiste  : 2000   MRN: [id number]    Service Location:  [facility]  Date of Service: 2023  Time: 1:45 PM    Procedure Performed By: Peep Faye M.D.    Pre-procedural Diagnosis:  Ph+ Acute B-Lymphoblastic Leukemia having achieved remission    Post-procedural Diagnosis: Ph+ Acute B-Lymphoblastic Leukemia having achieved remission    Procedure:  Lumbar Puncture with administration of intrathecal chemotherapy    Anxiolysis: Versed 2 mg IV x 1    Analgesia: EMLA cream    Intrathecal Chemotherapy:  Yes    Chemotherapy Administered:  Methotrexate 12 mg IT (in 6 mL NS)    Needle Size:  22 gauge, 3.5 in  Site: L3-L4  Number of Attempts: 1  Fluid:  6 ml clear fluid obtained  Labs: Cell count, cytology    Complications:  None    Procedure Note:    Tomas Jean-Baptiste is a 23 y.o. male diagnosed with Ph+ Acute B-Lymphoblastic Leukemia having achieved remission.  He presents today for scheduled chemotherapy, Maintenance, Cycle 3, Day 1 and scheduled lumbar puncture with intrathecal chemotherapy.  Prior to the procedure, the risks and benefits were discussed with the patient.  Consent for the procedure was signed by patient and placed in his chart.  All pertinent labs and history were reviewed and a complete History and Physical Examination were performed and placed in the medical record.  Tomas Roy was then taken to the procedure room where the procedure was performed.  All necessary safety equipment per ASA guidelines were available.  A time-out was performed and the patient identified by name,  and medical record number.  Gloves and mask were worn during the entire procedure.  Tomas Roy was prepped and draped in the usual sterile fashion with povoiodine.  He was positioned in the left decubitus position and all landmarks including superior posterior iliac  crest, umbilicus and vertebral bodies were identified by palpation.  A one time dose of Versed 2 mg IV was given in anticipation of the procedure.  Then a 3.5 in, 22 gauge spinal needle was introduced into the L3-L4 spinal interspace.  6 ml of clear fluid were obtained and sent for cell count and cytology.  Methotrexate 12 mg in 6 mL of NS was verified with the nurse bedside and then then administered into the spinal fluid. Tomas Roy tolerated the procedure without complication or bleeding.     Results:    PENDING    Pepe Faye MD  Pediatric Hematology / Oncology  [facility]  Cell.  [phone]

## 2023-11-06 NOTE — PROGRESS NOTES
"Pharmacy Chemotherapy Calculations Note:    Dx: B-ALL (CNS3)  Cycle: 3 Maintenance Day 1   Previous treatment: Maint C2D57 on 10/9/23     Protocol: Maintenance per Arm B of [id number] [facility] ID number: [id number]      /80   Pulse (!) 110   Temp 37.3 °C (99.1 °F) (Temporal)   Resp 20   Ht 1.65 m (5' 4.96\")   Wt 66.1 kg (145 lb 11.6 oz)   SpO2 97%   BMI 24.28 kg/m²  Body surface area is 1.74 meters squared.    Labs from 11/6/23 reviewed - all within treatment parameters.       IT methotrexate 12 mg fixed dose for age >/= 3 yrs   No Calc Req'd, ok for final dose = 12 mg IT injection by MD    Vincristine 1.5 mg/m² (max 2 mg) x 1.74 m² = 2.61 mg   Max 2 mg, ok for final dose = 2 mg IV      Judy Bryant, PharmD, BCOP                "

## 2023-11-12 PROCEDURE — RXMED WILLOW AMBULATORY MEDICATION CHARGE: Performed by: PEDIATRICS

## 2023-11-14 ENCOUNTER — PHARMACY VISIT (OUTPATIENT)
Dept: PHARMACY | Facility: MEDICAL CENTER | Age: 23
End: 2023-11-14
Payer: COMMERCIAL

## 2023-11-15 PROCEDURE — RXMED WILLOW AMBULATORY MEDICATION CHARGE: Performed by: PEDIATRICS

## 2023-11-16 ENCOUNTER — PHARMACY VISIT (OUTPATIENT)
Dept: PHARMACY | Facility: MEDICAL CENTER | Age: 23
End: 2023-11-16
Payer: COMMERCIAL

## 2023-11-16 ENCOUNTER — TELEPHONE (OUTPATIENT)
Dept: PHARMACY | Facility: MEDICAL CENTER | Age: 23
End: 2023-11-16
Payer: MEDICAID

## 2023-11-16 NOTE — TELEPHONE ENCOUNTER
Contact:  Phone number:[phone] (mobile)    Name of person spoken with and relationship to patient: Tomas (self)   Patient’s Adherence:  How patient is doing on medication: Well    How many missed doses and reason: 0 N/A    Any new medications: No    Any new conditions: No    Any new allergies: No    Any new side effects: No    Any new diagnoses: No    How many doses remaining: at least a week    Did patient want to speak with pharmacist: No   Delivery:  Delivery date and method: 11/20/23 via     Needs by Date: 11/20/23    Signature required: No     Any additional details for : N/A   Teach Appointment Date:  N/A   Shipping Address:  [address] [place] [address]   Medication(name,strength and dose):  predniSONE (DELTASONE) 10 MG Tab, methotrexate 2.5 MG tablet, and sulfamethoxazole-trimethoprim (BACTRIM DS) 800-160 MG tablet   Copay:  $15.00   Payment Method:  Credit card on file   Supplies:  NA   Additional Information:  MARIAELENA [facility]   Pharmacy Liaison  [phone]  11/16/2023 10:18 AM

## 2023-11-17 ENCOUNTER — DOCUMENTATION (OUTPATIENT)
Dept: PHARMACY | Facility: MEDICAL CENTER | Age: 23
End: 2023-11-17
Payer: MEDICAID

## 2023-11-17 NOTE — PROGRESS NOTES
PHARMACIST PRE SCREEN -   Patient only filling mercaptopurine with the pharmacy, clinical does not actively follow due to low risk. We will assist as needed for any questions or concerns.

## 2023-11-20 DIAGNOSIS — C91.Z0 B LYMPHOBLASTIC LEUKEMIA WITH T(9;22)(Q34;Q11.2);BCR-ABL1 (HCC): ICD-10-CM

## 2023-11-21 ENCOUNTER — HOSPITAL ENCOUNTER (OUTPATIENT)
Dept: INFUSION CENTER | Facility: MEDICAL CENTER | Age: 23
End: 2023-11-21
Attending: PEDIATRICS
Payer: COMMERCIAL

## 2023-11-21 ENCOUNTER — OFFICE VISIT (OUTPATIENT)
Dept: OPHTHALMOLOGY | Facility: MEDICAL CENTER | Age: 23
End: 2023-11-21
Payer: COMMERCIAL

## 2023-11-21 VITALS
WEIGHT: 150.13 LBS | TEMPERATURE: 98.8 F | BODY MASS INDEX: 25.01 KG/M2 | HEIGHT: 65 IN | HEART RATE: 93 BPM | OXYGEN SATURATION: 99 % | SYSTOLIC BLOOD PRESSURE: 128 MMHG | RESPIRATION RATE: 20 BRPM | DIASTOLIC BLOOD PRESSURE: 79 MMHG

## 2023-11-21 DIAGNOSIS — H49.22 LEFT ABDUCENS NERVE PALSY: ICD-10-CM

## 2023-11-21 DIAGNOSIS — H52.13 MYOPIA OF BOTH EYES: ICD-10-CM

## 2023-11-21 DIAGNOSIS — C91.00 PHILADELPHIA CHROMOSOME POSITIVE ACUTE LYMPHOBLASTIC LEUKEMIA (HCC): ICD-10-CM

## 2023-11-21 DIAGNOSIS — H40.003 GLAUCOMA SUSPECT OF BOTH EYES: ICD-10-CM

## 2023-11-21 DIAGNOSIS — C91.Z0 B LYMPHOBLASTIC LEUKEMIA WITH T(9;22)(Q34;Q11.2);BCR-ABL1 (HCC): ICD-10-CM

## 2023-11-21 DIAGNOSIS — H46.9 OPTIC NEUROPATHY: ICD-10-CM

## 2023-11-21 LAB
BASOPHILS # BLD AUTO: 0.6 % (ref 0–1.8)
BASOPHILS # BLD: 0.02 K/UL (ref 0–0.12)
EOSINOPHIL # BLD AUTO: 0.07 K/UL (ref 0–0.51)
EOSINOPHIL NFR BLD: 2.1 % (ref 0–6.9)
ERYTHROCYTE [DISTWIDTH] IN BLOOD BY AUTOMATED COUNT: 63 FL (ref 35.9–50)
HCT VFR BLD AUTO: 32.7 % (ref 42–52)
HGB BLD-MCNC: 10.6 G/DL (ref 14–18)
IMM GRANULOCYTES # BLD AUTO: 0.01 K/UL (ref 0–0.11)
IMM GRANULOCYTES NFR BLD AUTO: 0.3 % (ref 0–0.9)
LYMPHOCYTES # BLD AUTO: 0.34 K/UL (ref 1–4.8)
LYMPHOCYTES NFR BLD: 10 % (ref 22–41)
MCH RBC QN AUTO: 34.9 PG (ref 27–33)
MCHC RBC AUTO-ENTMCNC: 32.4 G/DL (ref 32.3–36.5)
MCV RBC AUTO: 107.6 FL (ref 81.4–97.8)
MONOCYTES # BLD AUTO: 0.47 K/UL (ref 0–0.85)
MONOCYTES NFR BLD AUTO: 13.9 % (ref 0–13.4)
NEUTROPHILS # BLD AUTO: 2.48 K/UL (ref 1.82–7.42)
NEUTROPHILS NFR BLD: 73.1 % (ref 44–72)
NRBC # BLD AUTO: 0 K/UL
NRBC BLD-RTO: 0 /100 WBC (ref 0–0.2)
PLATELET # BLD AUTO: 168 K/UL (ref 164–446)
PMV BLD AUTO: 10 FL (ref 9–12.9)
RBC # BLD AUTO: 3.04 M/UL (ref 4.7–6.1)
WBC # BLD AUTO: 3.4 K/UL (ref 4.8–10.8)

## 2023-11-21 PROCEDURE — 99214 OFFICE O/P EST MOD 30 MIN: CPT | Mod: 25 | Performed by: OPHTHALMOLOGY

## 2023-11-21 PROCEDURE — 85025 COMPLETE CBC W/AUTO DIFF WBC: CPT

## 2023-11-21 PROCEDURE — 36591 DRAW BLOOD OFF VENOUS DEVICE: CPT

## 2023-11-21 PROCEDURE — 92060 SENSORIMOTOR EXAMINATION: CPT | Performed by: OPHTHALMOLOGY

## 2023-11-21 PROCEDURE — 700111 HCHG RX REV CODE 636 W/ 250 OVERRIDE (IP): Mod: UD | Performed by: PEDIATRICS

## 2023-11-21 PROCEDURE — A4212 NON CORING NEEDLE OR STYLET: HCPCS

## 2023-11-21 PROCEDURE — 92250 FUNDUS PHOTOGRAPHY W/I&R: CPT | Performed by: OPHTHALMOLOGY

## 2023-11-21 RX ORDER — 0.9 % SODIUM CHLORIDE 0.9 %
20 VIAL (ML) INJECTION PRN
Status: CANCELLED | OUTPATIENT
Start: 2023-11-21

## 2023-11-21 RX ORDER — HEPARIN SODIUM,PORCINE 10 UNIT/ML
30 VIAL (ML) INTRAVENOUS PRN
Status: CANCELLED
Start: 2023-11-21

## 2023-11-21 RX ADMIN — HEPARIN 500 UNITS: 100 SYRINGE at 11:02

## 2023-11-21 ASSESSMENT — FIBROSIS 4 INDEX: FIB4 SCORE: 0.61

## 2023-11-21 ASSESSMENT — EXTERNAL EXAM - LEFT EYE: OS_EXAM: NORMAL

## 2023-11-21 ASSESSMENT — REFRACTION_MANIFEST
OD_CYLINDER: +3.25
METHOD_AUTOREFRACTION: 1
OD_AXIS: 096
OD_SPHERE: -7.50
OS_CYLINDER: +3.00
OS_AXIS: 084
OS_SPHERE: -8.00

## 2023-11-21 ASSESSMENT — VISUAL ACUITY
METHOD: SNELLEN - LINEAR
OD_CC: 20/20
OS_CC: 20/25
CORRECTION_TYPE: GLASSES

## 2023-11-21 ASSESSMENT — TONOMETRY
OS_IOP_MMHG: 16
OD_IOP_MMHG: 16
OS_IOP_MMHG: 24
IOP_METHOD: APPLANATION
OD_IOP_MMHG: 19

## 2023-11-21 ASSESSMENT — SLIT LAMP EXAM - LIDS
COMMENTS: NORMAL
COMMENTS: NORMAL

## 2023-11-21 ASSESSMENT — REFRACTION_WEARINGRX
SPECS_TYPE: SVL
OS_HPRISM: 5
OD_HPRISM: 5
OD_AXIS: 095
OS_AXIS: 090
OD_HBASE: OUT
OD_SPHERE: -7.25
OS_SPHERE: -7.25
OS_CYLINDER: +2.50
OD_CYLINDER: +2.75
OS_HBASE: OUT

## 2023-11-21 ASSESSMENT — REFRACTION_FINALRX
OS_HPRISM: 5
OS_HBASE: OUT
OD_HPRISM: 5
OD_HBASE: OUT

## 2023-11-21 ASSESSMENT — EXTERNAL EXAM - RIGHT EYE: OD_EXAM: NORMAL

## 2023-11-21 ASSESSMENT — CUP TO DISC RATIO
OS_RATIO: 0.3
OD_RATIO: 0.3

## 2023-11-21 NOTE — ASSESSMENT & PLAN NOTE
6/3/29505 0- high myopia. Has old glasses and under monocular testing vision improves near baseline. No apparent retinal abnormality  7/25/2022 - continue current rx  11/21/2023-seeing well with current Rx and ground in prism

## 2023-11-21 NOTE — ASSESSMENT & PLAN NOTE
8/31/2022 - Having bone marrow biopsy tomorrow  11/9/2022 - End of march is end of treatment, one XRT left. Has port and still getting some MTX  11/21/2023 - Finished XRT in July. Systemic chemo continuing.

## 2023-11-21 NOTE — PROGRESS NOTES
Peds/Neuro Ophthalmology:   Ephraim Carranza M.D.    Date & Time note created:    11/21/2023   12:22 PM     Referring MD / APRN:  Margarito Arvizu M.D., No att. providers found    Patient ID:  Name:             Tomas Jean-Baptiste   YOB: 2000  Age:                 23 y.o.  male   MRN:               [id number]    Chief Complaint/Reason for Visit:     Other (Left abducens nerve palsy)      History of Present Illness:    JULY Jean-Baptiste is a 23 y.o. male   Patient here for follow up cancer with prism wear in glasses.No double vision with glasses.        Review of Systems:  Review of Systems   All other systems reviewed and are negative.      Past Medical History:   Past Medical History:   Diagnosis Date    Cancer (HCC)     Leukoma        Past Surgical History:  Past Surgical History:   Procedure Laterality Date    INCISION AND DRAINAGE GENERAL Left 2/17/2023    Procedure: INCISION AND DRAINAGE OF LEFT SHOULDER;  Surgeon: Luke Haddad M.D.;  Location: SURGERY [facility];  Service: General    CATH PLACEMENT Right 8/29/2022    Procedure: INSERTION, CATHETER;  Surgeon: Simona Moise M.D.;  Location: SURGERY [facility];  Service: Ent       Current Outpatient Medications:  Current Outpatient Medications   Medication Sig Dispense Refill    predniSONE (DELTASONE) 10 MG Tab Take 3.5 tablets by mouth in the morning and evening for 5 Days at Day 1, 29 and 57 of each cycle. 35 Tablet 6    mercaptopurine (PURINETHOL) 50 MG Tab Take 2 tablets by mouth in the evening x 7 days of the week. 64 Tablet 5    imatinib (GLEEVEC) 400 MG tablet Take 1.5 Tablets by mouth every morning for 30 days. Pt takes 200 mg + 400 mg for a total dose of 600 mg daily 45 Tablet 3    methotrexate 2.5 MG tablet Take 10.5 Tablets by mouth every 7 days for 30 days. 42 Tablet 0    omeprazole (PRILOSEC) 20 MG delayed-release capsule Take 1 Capsule by mouth every day for 180 days. 30 Capsule 5    LORazepam  (ATIVAN) 1 MG Tab Take 1 mg by mouth every four hours as needed for Anxiety.      sulfamethoxazole-trimethoprim (BACTRIM DS) 800-160 MG tablet Take 2 Tablets by mouth twice daily two days a week. (Patient taking differently: Take 1 Tablet by mouth 2 times a day.) 48 Tablet 11    Melatonin 5 MG Cap Take 5 mg by mouth at bedtime as needed.      ondansetron (ZOFRAN ODT) 8 MG TABLET DISPERSIBLE Dissolve 1 Tablet by mouth every 6 hours as needed for Nausea/Vomiting. 10 Tablet 0     No current facility-administered medications for this visit.     Facility-Administered Medications Ordered in Other Visits   Medication Dose Route Frequency Provider Last Rate Last Admin    heparin lock flush 100 unit/mL injection 500 Units  500 Units Intracatheter PRN Pepe Faye M.D.   500 Units at 11/21/23 1102       Allergies:  Allergies   Allergen Reactions    Amoxicillin Rash     Reacted as an infant. No swelling or airway problems.  Tolerated cefepime June 2022       Family History:  Family History   Problem Relation Age of Onset    No Known Problems Mother     Stroke Maternal Grandmother     Diabetes Paternal Grandfather     Hypertension Paternal Grandfather     Hyperlipidemia Paternal Grandfather     Cancer Neg Hx     Heart Disease Neg Hx        Social History:  Social History     Socioeconomic History    Marital status: Single     Spouse name: Not on file    Number of children: Not on file    Years of education: Not on file    Highest education level: Not on file   Occupational History    Not on file   Tobacco Use    Smoking status: Never    Smokeless tobacco: Never   Vaping Use    Vaping Use: Never used   Substance and Sexual Activity    Alcohol use: Never    Drug use: Never    Sexual activity: Not Currently   Other Topics Concern    Behavioral problems Not Asked    Interpersonal relationships Not Asked    Sad or not enjoying activities Not Asked    Suicidal thoughts Not Asked    Poor school performance Not Asked    Reading  difficulties Not Asked    Speech difficulties Not Asked    Writing difficulties Not Asked    Inadequate sleep Not Asked    Excessive TV viewing Not Asked    Excessive video game use Not Asked    Inadequate exercise Not Asked    Sports related Not Asked    Poor diet Not Asked    Family concerns for drug/alcohol abuse Not Asked    Poor oral hygiene Not Asked    Bike safety Not Asked    Family concerns vehicle safety Not Asked   Social History Narrative    Not on file     Social Determinants of Health     Financial Resource Strain: Not on file   Food Insecurity: Not on file   Transportation Needs: Not on file   Physical Activity: Not on file   Stress: Not on file   Social Connections: Not on file   Intimate Partner Violence: Not on file   Housing Stability: Not on file          Physical Exam:  Physical Exam    Oriented x 3  Weight/BMI: There is no height or weight on file to calculate BMI.  There were no vitals taken for this visit.    Base Eye Exam       Visual Acuity (Snellen - Linear)         Right Left    Dist cc 20/20 20/25      Correction: Glasses              Tonometry (i care, 10:06 AM)         Right Left    Pressure 19 24              Tonometry #2 (Applanation, 10:27 AM)         Right Left    Pressure 16 16              Pupils         Pupils    Right PERRL    Left PERRL              Neuro/Psych       Oriented x3: Yes    Mood/Affect: Normal                  Additional Tests       Stereo       Fly: +    Animals: 3/3    Circles: 2/9                  Strabismus Exam       Correction: cc      Distance Near Near +3DS N Bifocals                      0 0 0   0 0 0                      ET 12 0  0  ET 10 0  0  ET 10                     0 0 0   0 0 0                       Slit Lamp and Fundus Exam       External Exam         Right Left    External Normal Normal              Slit Lamp Exam         Right Left    Lids/Lashes Normal Normal    Conjunctiva/Sclera White and quiet White and quiet    Cornea Clear Clear     Anterior Chamber Deep and quiet Deep and quiet    Iris Round and reactive Round and reactive    Lens Clear Clear    Vitreous Normal Normal              Fundus Exam         Right Left    Disc Normal Normal    C/D Ratio 0.3 0.3    Macula Normal Normal    Vessels Normal Normal    Periphery Normal Normal                  Refraction       Wearing Rx         Sphere Cylinder Axis Horz Prism    Right -7.25 +2.75 095 5 out    Left -7.25 +2.50 090 5 out      Type: SVL              Manifest Refraction (Auto)         Sphere Cylinder Axis    Right -7.50 +3.25 096    Left -8.00 +3.00 084              Final Rx         Sphere Cylinder Axis Horz Prism    Right -7.50 +2.75 095 5 out    Left -7.50 +2.50 090 5 out      Type: SVL                    Pertinent Lab/Test/Imaging Review:      Assessment and Plan:     [place] chromosome positive acute lymphoblastic leukemia (HCC)  8/31/2022 - Having bone marrow biopsy tomorrow  11/9/2022 - End of march is end of treatment, one XRT left. Has port and still getting some MTX  11/21/2023 - Finished XRT in July. Systemic chemo continuing.     Left abducens nerve palsy  6/3/2022 - 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 Blurry vision secondary to asthenopia from overlapping images and acuity improves under monocular conditions. 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 Less likely infectious process given no papilledema, headaches or other meningeal signs. Could also be some form of ischemic process if BP fluctuations during induction. Recommend MRI orbits with citlali and repeat LP. Recommend that he could wear eye patch.   7/25/2022 - Has almost full abduction of the left eye, but has a relatively comitant esotropia. Uncertain if this is as the nerve heals, or being left with a comitant strabismus. In PAT did well wih a 12 base out prism OS. Will re-eval in 4 to 6 weeks and if stable will grind in or if improved will taper the prism  8/31/2022 - Doing will with the prism glasses. Showing  some slight improvement. Measuring 10 diopters today. Thus will continue to monitor   11/9/2022 - stable at around 10 prisms. Placed new press on prism. However has vision benefit and considering new rx, so will give with 5 base out to grind in.  Discussed waiting 8 months to a year prior to strabismus surgical repair  11/21/2023 - Has rx prism 5 base out ground in OU.  Doing well orthotropic in primary position.  Extraocular muscles essentially full suggesting following improvement of 6th nerve palsy is left with a competent esotropia.  Since it has not progressed and happy with prisms we will continue to monitor    Myopia of both eyes  6/3/01688 0- high myopia. Has old glasses and under monocular testing vision improves near baseline. No apparent retinal abnormality  7/25/2022 - continue current rx  11/21/2023-seeing well with current Rx and ground in prism    Glaucoma suspect of both eyes  11/21/2023-IOP stable.  Slight increased cup-to-disc.  Optic nerve head appearance is stable.  Does have slight change in OCT neurofibrillary thickness that will monitor.  OCT neurofibrillary thickness OD 85 OS 87 today        Ephraim Carranza M.D.

## 2023-11-21 NOTE — ASSESSMENT & PLAN NOTE
11/21/2023-IOP stable.  Slight increased cup-to-disc.  Optic nerve head appearance is stable.  Does have slight change in OCT neurofibrillary thickness that will monitor.  OCT neurofibrillary thickness OD 85 OS 87 today

## 2023-11-21 NOTE — ASSESSMENT & PLAN NOTE
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 Blurry vision secondary to asthenopia from overlapping images and acuity improves under monocular conditions. 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 Less likely infectious process given no papilledema, headaches or other meningeal signs. Could also be some form of ischemic process if BP fluctuations during induction. Recommend MRI orbits with citlali and repeat LP. Recommend that he could wear eye patch.   7/25/2022 - Has almost full abduction of the left eye, but has a relatively comitant esotropia. Uncertain if this is as the nerve heals, or being left with a comitant strabismus. In PAT did well wih a 12 base out prism OS. Will re-eval in 4 to 6 weeks and if stable will grind in or if improved will taper the prism  8/31/2022 - Doing will with the prism glasses. Showing some slight improvement. Measuring 10 diopters today. Thus will continue to monitor   11/9/2022 - stable at around 10 prisms. Placed new press on prism. However has vision benefit and considering new rx, so will give with 5 base out to grind in.  Discussed waiting 8 months to a year prior to strabismus surgical repair  11/21/2023 - Has rx prism 5 base out ground in OU.  Doing well orthotropic in primary position.  Extraocular muscles essentially full suggesting following improvement of 6th nerve palsy is left with a competent esotropia.  Since it has not progressed and happy with prisms we will continue to monitor

## 2023-11-22 NOTE — PROGRESS NOTES
Pt to [facility] for lab draw.  Afebrile.  VSS.  Awake and alert in no acute distress.  Port accessed with 22 g 3/4in and labs drawn from the port without difficulty / with 1 attempt.   Pt tolerated well. Port flushed per orders (see MAR) and port de-accessed.  Plan to follow up with Dr. Faye for results.

## 2023-11-28 ENCOUNTER — TELEPHONE (OUTPATIENT)
Dept: PHARMACY | Facility: MEDICAL CENTER | Age: 23
End: 2023-11-28
Payer: MEDICAID

## 2023-11-28 ENCOUNTER — OFFICE VISIT (OUTPATIENT)
Dept: PSYCHOLOGY | Facility: MEDICAL CENTER | Age: 23
End: 2023-11-28
Attending: PSYCHOLOGIST
Payer: COMMERCIAL

## 2023-11-28 DIAGNOSIS — C91.Z0 B LYMPHOBLASTIC LEUKEMIA WITH T(9;22)(Q34;Q11.2);BCR-ABL1 (HCC): ICD-10-CM

## 2023-11-28 PROCEDURE — 96159 HLTH BHV IVNTJ INDIV EA ADDL: CPT | Performed by: PSYCHOLOGIST

## 2023-11-28 PROCEDURE — 2023F DILAT RTA XM W/O RTNOPTHY: CPT | Performed by: PSYCHOLOGIST

## 2023-11-28 PROCEDURE — 96158 HLTH BHV IVNTJ INDIV 1ST 30: CPT | Performed by: PSYCHOLOGIST

## 2023-11-28 NOTE — TELEPHONE ENCOUNTER
Prior Authorization for imatinib (GLEEVEC) 400 MG tablet  (Quantity: 45, Days: 30) has been submitted via Cover My Meds: Key ([id number])    Insurance: RX [employer]    Request was submitted as URGENT    Will follow up in 24-48 business hours    Josh Benito [facility]   Pharmacy Liaison  [phone]  11/28/2023 10:24 AM

## 2023-11-28 NOTE — TELEPHONE ENCOUNTER
Clinical note documents were too big to send over through [facility]    They have been faxed manually to [phone]    Will follow up in 24-48 hours     Josh Benito [facility]   Pharmacy Liaison  [phone]  11/28/2023 10:30 AM

## 2023-11-28 NOTE — TELEPHONE ENCOUNTER
Received Renewal  request via [employer]  for imatinib (GLEEVEC) 400 MG tablet . (Quantity:45, Day Supply:30)     Insurance: RX [employer]  Member ID:  [id number]  BIN: [id number]  PCN: [id number]  Group: [id number]     Ran Test claim via Spray & medication Rejects stating prior authorization is required.    Josh Benito [facility]   Pharmacy Liaison  [phone]  11/28/2023 10:21 AM

## 2023-11-28 NOTE — TELEPHONE ENCOUNTER
Drug: imatinib (GLEEVEC) 400 MG tablet     Called [phone] to follow up on the PA that was submitted earlier. Spoke with Danuta. She has informed me that the patients coverage ended on 11/1/23.    Called Tomas (patient) to see if there is any new coverage or if he is just on straight Medicaid now. He mentioned he should just be covered under Medicaid.     I informed him that he will need to call to let them know they are all he has now for coverage so his file can be updated. He will call me back to update me once that has been done so we can try to resubmit the claim, a new PA may be needed through Medicaid once the flag on his account has been removed.     Will update notes as information is updated.    Josh Benito [facility]   Pharmacy Liaison  [phone]  11/28/2023 3:37 PM

## 2023-11-29 NOTE — PROGRESS NOTES
PEDIATRIC BEHAVIORAL HEALTH VISIT    Name:  Tomas Jean-Baptiste  MRN:  [id number]  :  2000  Age:  23 y.o.  Referring Provider: Dr. Faye (Hem/Onc)  Pediatrician:  Margarito Arvizu M.D.  Date of Service:  23    Persons in Attendance: Tomas Roy     Chief Complaint/ Reason for Appointment: JULY, a now 24 y/o male currently in treatment for [place] Chromosome Precursor B-Cell Acute Lymphoblastic Leukemia was referred to counseling to assist in decreasing anxiety he has been experiencing and processing ways for how he can find himself now and what life will look like. See intake note dated 23    Mental Status Exam:   General description In no apparent distress, well-groomed, appropriately attired, well-nourished, and cooperative with interview  Interactional style Culturally appropriate  Eye contact Normal and appropriate for culture  Speech Unimpaired, fluid and clear, normal rate and rythem  Motor activity Normal motor activity  Orientation Oriented to person time, place and situation  Intellectual functioning Unimpaired  Memory Unimpaired  Attention and concentration Intact and normative concentration  Fund of knowledge Average  Mood Euthymic  Affect Appropriate  Perceptual Disturbances None apparent  Thought Process  No abnormalities apparent       Associations Unimpaired associations       Abstractions Normal abstractions, intact       Insight Insight - adequate and normative       Judgment Judgments - intact and normative   Thought Content  No apparent delusions    Risk Assessment:  Tomas Roy denied current concerns regarding risk to self or others.       Issues Discussed:   This provider met with JULY to continue assisting in rediscovering himself after the trauma of his cancer diagnosis and treatment as well as how childhood impacts this. Today the session was spent catching up on recent events with the theme of how he is reconnecting with his passions and friends he has  not seen in awhile. Processed how he is continuing to reclaim his life and his mother's reaction. From JULY's perspective, she has made comments about the frustration he exhibits when he is a cancer patient. JULY acknowledged that generally he has a positive attitude, but also feels he has the right to feel frustrated sometimes. We discussed ways he can process this with his mother in a non-confrontational way. Time was also spent talking about his future plans and the struggle in his mother not accepting his girlfriend.     Techniques and Interventions Used: Psycho-education and Cognitive Behavioral Therapy (CBT)      Treatment Recommendations and Plan:  JULY, a now 22 y/o male currently in treatment for [place] Chromosome Precursor B-Cell Acute Lymphoblastic Leukemia was referred to counseling to assist in decreasing anxiety he has been experiencing and processing ways for how he can find himself now and what life will look like. After meeting with JULY during his intake, it appears he could benefit from learning anxiety management skills, processing what he has been through, and how to live the life he wants. Tomas Roy Nabeel Amarosa could benefit from learning appropriate ways of expressing and coping with his emotions. He could also benefit from learning Cognitive Behavioral Therapy (CBT) and Acceptance and Commitment Therapy (ACT) tools to understand the interaction of thoughts, feelings, and actions. As well as Trauma Focused-CBT to process his cancer journey. Tomas Jean-Baptiste agreed with the plan.       PLAN  JULY will learn and utilize appropriate ways to express and cope with emotions.  Reviewed ways he can approach the topic with his mother to better understand her feelings of his life.    He will learn the connection between thoughts, feelings, and actions utilizing CBT and ACT tools.,   Continued processing the impact of cancer on his life and ways to notice triggers.   We also  discussed ways he can make plans for 12/27 and honor the experience and where he is now.   JULY will process how their medical diagnosis is impacting their life.  Talked about how he is reclaiming his life.     Next visit we will review ways he lora with emotions, what he has noticed in coming for medical visits now, and begin exploring his core beliefs.     The above diagnostic impressions, recommendations, and treatment plan were discussed with and agreed upon by oTmas Roy, and his caregivers. Care will be coordinated with Tomas Roy's healthcare team, as appropriate.    Total time spent on encounter was 60 minutes.    Laurie Ortega, PhD  Pediatric Psychologist   Licensed Psychologist, [place] # [id number]  [facility], Behavioral Health

## 2023-11-30 PROCEDURE — RXMED WILLOW AMBULATORY MEDICATION CHARGE: Performed by: PEDIATRICS

## 2023-11-30 NOTE — TELEPHONE ENCOUNTER
Prior Authorization for imatinib (GLEEVEC) 400 MG tablet  (Quantity: 45, Days: 30) has been submitted via Cover My Meds: Key ([id number])    Insurance: RX [employer]        Called and spoke to Tomas, he is currently waiting for Medicaid to update his file to reflect [place] as his primary insurance, since that is the only coverage he will have from this point moving forward.     We are still waiting for Medicaid to make the appropriate updates before I can get a final determination on the approval of this medication.    Will follow up in 24 hours     Josh Benito [facility]   Pharmacy Liaison  [phone]  11/30/2023 9:27 AM

## 2023-12-01 ENCOUNTER — PHARMACY VISIT (OUTPATIENT)
Dept: PHARMACY | Facility: MEDICAL CENTER | Age: 23
End: 2023-12-01
Payer: COMMERCIAL

## 2023-12-01 ENCOUNTER — DOCUMENTATION (OUTPATIENT)
Dept: PHARMACY | Facility: MEDICAL CENTER | Age: 23
End: 2023-12-01
Payer: MEDICAID

## 2023-12-01 ENCOUNTER — TELEPHONE (OUTPATIENT)
Dept: PHARMACY | Facility: MEDICAL CENTER | Age: 23
End: 2023-12-01
Payer: MEDICAID

## 2023-12-01 DIAGNOSIS — C91.Z0 B LYMPHOBLASTIC LEUKEMIA WITH T(9;22)(Q34;Q11.2);BCR-ABL1 (HCC): ICD-10-CM

## 2023-12-01 RX ORDER — IMATINIB MESYLATE 100 MG/1
200 TABLET, FILM COATED ORAL DAILY
Qty: 60 TABLET | Refills: 0 | Status: SHIPPED | OUTPATIENT
Start: 2023-12-01 | End: 2023-12-04

## 2023-12-01 RX ORDER — IMATINIB MESYLATE 400 MG/1
400 TABLET, FILM COATED ORAL DAILY
Qty: 30 TABLET | Refills: 0 | Status: SHIPPED | OUTPATIENT
Start: 2023-12-01 | End: 2023-12-04

## 2023-12-01 NOTE — PROGRESS NOTES
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 No concerning drug or non-drug allergies.                                                                                          Drug Therapy (name/formulation/dose/route of admin/frequency): Imatinib 600mg (400mg tab x1.5)  by mouth every morning.   EMR Reviewed   - Dose Appropriateness (Y/N): Y but tabs shouldn't be split since it's hazardous, messaged oncologist to see if script can be updated to 400mg tab x1 + 100mg tab x2 for 600mg dose daily    - Renal or Hepatic Adjustments (Y/N): N, no renal or hepatic dysfunction noted    - Any comorbidities, PMH, precautions, or contraindications that pose a medication safety concern? (Y/N): N   - Any relevant lab work needed that affects the initial start date? (Y/N): N        - List Past Treatments: Daunorubicin, Doxorubicin, Vincristine, MTX    - List DDI’s:     - Cat D: Bactrim and Omeprazole may enhance adverse effects of MTX , monitor for MTX toxicity such as mucositis, myalgias, myelosuppression, consider holding PPI, will review at initial    - Patient’s ability to self-administer medication     - No issues identified per EMR       List Goals of Therapy:   - Reduce progression of disease (POD) and/or improve quality of life   - Mitigation of side effects  - Promote optimal medication administration and adherence   - Improve overall survival (OS) and progression free survival (PFS)   - Reduce tumor biomarkers         Initial Assessment   Dx: B lymphoblastic leukemia with t(9;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     - Administration: takes it right before bed around 8PM with meal    - Proper Handling/Disposal: hazardous, wash hands before and after handling, wear glove, put plastic on table where splitting tabs and then  wash counter surface with warm soap and water- said this is typically what he does, if anyone if pregnant or breastfeeding should avoid     - Storage/Excursion: room temp, out of bathroom, usually in kitchen in pill container      - Duration of therapy:  until disease progression or toxicity        Adherence & Potential Barriers: no missed doses reported, routine-based adherence     - Missed dose mgmt: skip and resume normal dosing, don't dbl up, let oncology team know       List Common, Serious SE & Mitigation Reviewed: declined- familiar with Gleevec   List Precautions Reviewed: declined- familiar with med   List Current SE Reviewed (if applicable): none    - Mitigation/mgmt: none       S/Sx: none noted   Clinically Relevant, Abnormal Labs: 11/21/23    - CBC: WBC: low 3.4, RBC: low 3.04, Hgb: low 10.6, Hct: low 32.7    - CHEM 7: potassium: low 3.5, glucose: high 124, BUN: low 6    - CRCL/GFR: Cr: 0.46, GFR: 152    - LFTs/TBili: WNL    - BP: 128/79    - Cancer Specific Biomarkers: BRC-ABL1       Wellness/Lifestyle Counseling:    - Support: doing well overall,  feeling well, started school again and has finals this week     - Immunizations: said he recently got flu vaccine, he's a little hesitant with the COVID booster so being cautious and plans on getting after treatment        Med Rec/Updated drug list:    EMR accurate, no medication list inaccuracies. Reviewed with patient   DI Check:     - Cat D: Bactrim and Omeprazole may enhance adverse effects of MTX , monitor for MTX toxicity, established with no issues    Common DI & Dietary Avoidance: avoid grapefruit and pomegranate - fruit and juice s      Goals of Therapy:    - Reduce progression of disease (POD) and/or improve quality of life    - Mitigation of side effects   - Promote optimal medication administration and adherence    - Improve overall survival (OS) and progression free survival (PFS)    - Reduce tumor biomarkers   Patient has agreed/understands to  goals of therapy during education/counseling      Additional:    Had the pleasure of speaking with Tomas to review his Gleevec. Established on med and tolerating well with no side effects. Confirmed he takes 600mg (400mg tab x1.5) every night around 8PM with food. Discussed safe handling practices with chemo pill splitting as I discussed this with oncologist and it's recommended in clinical trial [id number] as well as insurance issues for changes, will continue with splitting using safe practices. Declined full review as he's familiar with med. Demonstrates adherence with no missed doses reported. Said he's doing pretty good overall, feeling well. Returned to school and has finals this week. No questions at this time. He was appreciative of call and welcoming to future calls.       Messaged Oncologist 12/1/23:  Hi Dr. Faye,    I'm a clinical pharmacist working with [facility]. I was reviewing patient's chart prior to calling to review Gleevec and see that scrip is for 600mg (400mg tab x1.5) once a day. It's not recommended to split Gleevec for safe handling since it's hazardous. Can this be updated to use the 400mg tab plus 100mg tab x2 for  600mg dose daily?    Thank you,  Aleah Anne, PharmD     Dr. Faye replied:  Thank you.  We have run into this in the past (inpatient) as the inpatient pharmacists are unwilling to split tabs.  We have also run into a problem with multiple prescriptions (100 mg + 400 mg tabs).  I have discussed this with the [facility].  The clinical trial that he is on [id number] actually recommends splitting the tabs.  I will send you by email the references.    Replied:   This is great to now and appreciate that! Email is [email or web address]   Will keep as is then and just notate reference and review safe handling practices with patient.

## 2023-12-01 NOTE — TELEPHONE ENCOUNTER
Prior Authorization for imatinib (GLEEVEC) 400 MG tablet  (Quantity: 45, Days: 30) has been submitted via Cover My Meds: Key ([id number])    Insurance: RX Lon    Patient's profile has been successfully updated to reflect RX Lon pharmacy benefits    PA is required for this medication    Will follow up in 24-48 business hours.     Josh Benito [facility]   Pharmacy Liaison  [phone]  12/1/2023 8:42 AM

## 2023-12-01 NOTE — TELEPHONE ENCOUNTER
Contact:  Phone number:[phone] (mobile)    Name of person spoken with and relationship to patient: JULY (Self)   Patient’s Adherence:  How patient is doing on medication: Well    How many missed doses and reason: 0 N/A    Any new medications: No    Any new conditions: No    Any new allergies: No    Any new side effects: No    Any new diagnoses: No    How many doses remainin    Did patient want to speak with pharmacist: No   Delivery:  Delivery date and method: 23 via     Needs by Date: 23    Signature required: No     Any additional details for :  Please deliver after 12pm   Teach Appointment Date:  N/A   Shipping Address:  [address] [place] [address]   Medication(name,strength and dose):  imatinib (GLEEVEC) 400 MG tablet    Copay:  $0   Payment Method:  n/a $0 copay   Supplies:  NA   Additional Information:  MARIAELENA Benito [facility]   Pharmacy Liaison  [phone]  2023 8:57 AM

## 2023-12-01 NOTE — TELEPHONE ENCOUNTER
PA for imatinib (GLEEVEC) 400 MG tablet  has been approved for a quantity of 45 , day supply 30    PA reference number: [id number]  Insurance: RX Lon  Effective dates: 12/1/2023 to 11/30/2024  Copay: $0     Is patient eligible to fill with [facility]? Yes    Next Steps: The Patients copay is less than $5.00. Will contact the patient to determine choice of pharmacy, if applicable.    This prescription will start to be filled regularly with [facility] beginning January 2024    There is a prescription order for this medication to be delivered to the patient today    Josh Benito [facility]   Pharmacy Liaison  [phone]  12/1/2023 10:11 AM

## 2023-12-04 ENCOUNTER — HOSPITAL ENCOUNTER (OUTPATIENT)
Dept: INFUSION CENTER | Facility: MEDICAL CENTER | Age: 23
End: 2023-12-04
Attending: PEDIATRICS
Payer: COMMERCIAL

## 2023-12-04 VITALS
TEMPERATURE: 97.8 F | BODY MASS INDEX: 24.76 KG/M2 | DIASTOLIC BLOOD PRESSURE: 66 MMHG | OXYGEN SATURATION: 98 % | SYSTOLIC BLOOD PRESSURE: 106 MMHG | WEIGHT: 148.59 LBS | RESPIRATION RATE: 18 BRPM | HEART RATE: 107 BPM | HEIGHT: 65 IN

## 2023-12-04 DIAGNOSIS — C91.Z0 B LYMPHOBLASTIC LEUKEMIA WITH T(9;22)(Q34;Q11.2);BCR-ABL1 (HCC): ICD-10-CM

## 2023-12-04 DIAGNOSIS — C91.01 ACUTE LYMPHOBLASTIC LEUKEMIA (ALL) IN REMISSION (HCC): ICD-10-CM

## 2023-12-04 DIAGNOSIS — Z51.11 ENCOUNTER FOR CHEMOTHERAPY MANAGEMENT: ICD-10-CM

## 2023-12-04 LAB
ALBUMIN SERPL BCP-MCNC: 4.5 G/DL (ref 3.2–4.9)
ALBUMIN/GLOB SERPL: 2 G/DL
ALP SERPL-CCNC: 95 U/L (ref 30–99)
ALT SERPL-CCNC: 22 U/L (ref 2–50)
ANION GAP SERPL CALC-SCNC: 12 MMOL/L (ref 7–16)
ANISOCYTOSIS BLD QL SMEAR: ABNORMAL
AST SERPL-CCNC: 20 U/L (ref 12–45)
BASOPHILS # BLD AUTO: 0.9 % (ref 0–1.8)
BASOPHILS # BLD: 0.02 K/UL (ref 0–0.12)
BILIRUB CONJ SERPL-MCNC: <0.2 MG/DL (ref 0.1–0.5)
BILIRUB INDIRECT SERPL-MCNC: NORMAL MG/DL (ref 0–1)
BILIRUB SERPL-MCNC: 0.4 MG/DL (ref 0.1–1.5)
BUN SERPL-MCNC: 8 MG/DL (ref 8–22)
CALCIUM ALBUM COR SERPL-MCNC: 8.3 MG/DL (ref 8.5–10.5)
CALCIUM SERPL-MCNC: 8.7 MG/DL (ref 8.5–10.5)
CHLORIDE SERPL-SCNC: 106 MMOL/L (ref 96–112)
CO2 SERPL-SCNC: 22 MMOL/L (ref 20–33)
CREAT SERPL-MCNC: 0.45 MG/DL (ref 0.5–1.4)
EOSINOPHIL # BLD AUTO: 0.29 K/UL (ref 0–0.51)
EOSINOPHIL NFR BLD: 13.9 % (ref 0–6.9)
ERYTHROCYTE [DISTWIDTH] IN BLOOD BY AUTOMATED COUNT: 58.9 FL (ref 35.9–50)
GFR SERPLBLD CREATININE-BSD FMLA CKD-EPI: 152 ML/MIN/1.73 M 2
GLOBULIN SER CALC-MCNC: 2.2 G/DL (ref 1.9–3.5)
GLUCOSE SERPL-MCNC: 101 MG/DL (ref 65–99)
HCT VFR BLD AUTO: 33.1 % (ref 42–52)
HGB BLD-MCNC: 10.9 G/DL (ref 14–18)
LYMPHOCYTES # BLD AUTO: 0.04 K/UL (ref 1–4.8)
LYMPHOCYTES NFR BLD: 1.7 % (ref 22–41)
MACROCYTES BLD QL SMEAR: ABNORMAL
MANUAL DIFF BLD: NORMAL
MCH RBC QN AUTO: 34.7 PG (ref 27–33)
MCHC RBC AUTO-ENTMCNC: 32.9 G/DL (ref 32.3–36.5)
MCV RBC AUTO: 105.4 FL (ref 81.4–97.8)
METAMYELOCYTES NFR BLD MANUAL: 0.9 %
MONOCYTES # BLD AUTO: 0.24 K/UL (ref 0–0.85)
MONOCYTES NFR BLD AUTO: 11.3 % (ref 0–13.4)
MORPHOLOGY BLD-IMP: NORMAL
NEUTROPHILS # BLD AUTO: 1.5 K/UL (ref 1.82–7.42)
NEUTROPHILS NFR BLD: 71.3 % (ref 44–72)
NRBC # BLD AUTO: 0 K/UL
NRBC BLD-RTO: 0 /100 WBC (ref 0–0.2)
OVALOCYTES BLD QL SMEAR: NORMAL
PLATELET # BLD AUTO: 173 K/UL (ref 164–446)
PLATELET BLD QL SMEAR: NORMAL
PMV BLD AUTO: 9.7 FL (ref 9–12.9)
POIKILOCYTOSIS BLD QL SMEAR: NORMAL
POTASSIUM SERPL-SCNC: 3.8 MMOL/L (ref 3.6–5.5)
PROT SERPL-MCNC: 6.7 G/DL (ref 6–8.2)
RBC # BLD AUTO: 3.14 M/UL (ref 4.7–6.1)
RBC BLD AUTO: PRESENT
SODIUM SERPL-SCNC: 140 MMOL/L (ref 135–145)
WBC # BLD AUTO: 2.1 K/UL (ref 4.8–10.8)

## 2023-12-04 PROCEDURE — A4212 NON CORING NEEDLE OR STYLET: HCPCS

## 2023-12-04 PROCEDURE — 96409 CHEMO IV PUSH SNGL DRUG: CPT

## 2023-12-04 PROCEDURE — 700105 HCHG RX REV CODE 258: Mod: UD | Performed by: PEDIATRICS

## 2023-12-04 PROCEDURE — 96375 TX/PRO/DX INJ NEW DRUG ADDON: CPT

## 2023-12-04 PROCEDURE — 85027 COMPLETE CBC AUTOMATED: CPT

## 2023-12-04 PROCEDURE — 99215 OFFICE O/P EST HI 40 MIN: CPT | Performed by: PEDIATRICS

## 2023-12-04 PROCEDURE — 700111 HCHG RX REV CODE 636 W/ 250 OVERRIDE (IP): Mod: JZ,UD | Performed by: PEDIATRICS

## 2023-12-04 PROCEDURE — 82248 BILIRUBIN DIRECT: CPT

## 2023-12-04 PROCEDURE — 80053 COMPREHEN METABOLIC PANEL: CPT

## 2023-12-04 PROCEDURE — 85007 BL SMEAR W/DIFF WBC COUNT: CPT

## 2023-12-04 PROCEDURE — 36591 DRAW BLOOD OFF VENOUS DEVICE: CPT

## 2023-12-04 RX ORDER — 0.9 % SODIUM CHLORIDE 0.9 %
20 VIAL (ML) INJECTION PRN
Status: CANCELLED | OUTPATIENT
Start: 2023-12-04

## 2023-12-04 RX ORDER — HEPARIN SODIUM,PORCINE 10 UNIT/ML
30 VIAL (ML) INTRAVENOUS PRN
Status: CANCELLED
Start: 2023-12-04

## 2023-12-04 RX ORDER — ONDANSETRON 2 MG/ML
8 INJECTION INTRAMUSCULAR; INTRAVENOUS ONCE
Status: COMPLETED | OUTPATIENT
Start: 2023-12-04 | End: 2023-12-04

## 2023-12-04 RX ADMIN — ONDANSETRON 8 MG: 2 INJECTION INTRAMUSCULAR; INTRAVENOUS at 14:35

## 2023-12-04 RX ADMIN — VINCRISTINE SULFATE 2 MG: 1 INJECTION, SOLUTION INTRAVENOUS at 14:42

## 2023-12-04 RX ADMIN — HEPARIN 500 UNITS: 100 SYRINGE at 14:50

## 2023-12-04 ASSESSMENT — FIBROSIS 4 INDEX: FIB4 SCORE: 0.53

## 2023-12-04 NOTE — PROGRESS NOTES
"Pharmacy Chemotherapy Calculations Note:    Dx: B-ALL (CNS3)  Cycle: 3 Maintenance Day 29   Previous treatment: Maint C3D1 on 11/6/23     Protocol: Maintenance per Arm B of [id number] [facility] ID number: [id number]      Ht 1.655 m (5' 5.16\")   Wt 67.4 kg (148 lb 9.4 oz)   BMI 24.61 kg/m²  Body surface area is 1.76 meters squared.    Labs from 12/4/23 reviewed - all within treatment parameters.         Vincristine 1.5 mg/m² (max 2 mg) x 1.76 m² = 2.64 mg   Max 2 mg, ok for final dose = 2 mg IV      Judy Bryant, PharmD, BCOP                "

## 2023-12-04 NOTE — PROGRESS NOTES
Pt to [facility] for lab draw, Doctor visit, and for IV Chemotherapy.  Afebrile.  VSS.  Awake and alert in no acute distress.  Port accessed with 22G 3/4inch with 1attempt. Labs drawn from the port without difficulty.  Pt tolerated well.      Dr. Duenas to bedside. Okay to proceed with chemotherapy prior to labs resulting.     Pre-medications given per  MAR.     Chemotherapy dosage calculated independently by Mary Saravia RN and Dayna Damian RN and compared to road map for protocol COG [id number].  Calculations within 10% of written order.     Chemotherapy given as ordered, see MAR.  Blood return verified prior to, during, and after chemotherapy infusion.  See Chemotherapy flowsheet.  Pt tolerated well.  No side effects or complications noted. Port flushed per orders (see MAR) and de-accessed after completion. Discharged home. Next appt 12/21/23 for labs and OV.

## 2023-12-04 NOTE — PROGRESS NOTES
"Pharmacy Chemotherapy Verification    Patient Name: Tomas Jean-Baptiste  Dx: pH+ B-Cell ALL         Protocol: Maintenance C3 and subsequent cycles(On Study Arm B, ID number: [id number])         Allergies: Amoxicillin       Ht 1.655 m (5' 5.16\")   Wt 67.4 kg (148 lb 9.4 oz)   BMI 24.61 kg/m²    Body surface area is 1.76 meters squared.    All lab results 12/4/23 within treatment parameters.     Drug Order   (Drug name, dose, route, IV Fluid & volume, frequency, number of doses) Maintenance, Cycle 3, Day 29  Previous treatment: Maintenance, C2D57 10/9/23   Medication = Vincristine (ONCOVIN)   Base Dose= 1.5 mg/m2  Calc Dose: Base Dose x 1.76m2 = >2mg  Final Dose = 2 mg (MAX)   Route = IV  Fluid & Volume = NS 25 mL  Admin Duration = Over 5 - 10 minutes    Days 1, 29, 57      <10% difference, okay to treat with final max dose   Medication = Methotrexate PF  Base Dose = 12 mg fixed dose  Calc Dose: n/a  Final Dose = --- mg   Route = INTRATHECAL  Fluid & Volume = pf NS 6 mL  Admin Duration = IT per MD Day 1   No calculation required.   okay to treat with final dose   By my signature below, I confirm this process was performed independently with the BSA and all final chemotherapy dosing calculations congruent. I have reviewed the above chemotherapy order and that my calculation of the final dose and BSA (when applicable) corroborate those calculations of the  pharmacist. Discrepancies of 10% or greater in the written dose have been addressed and documented within the [facility] Progress notes.    Galen Wilson, PharmD  "

## 2023-12-05 PROCEDURE — RXMED WILLOW AMBULATORY MEDICATION CHARGE: Performed by: PEDIATRICS

## 2023-12-05 NOTE — PROGRESS NOTES
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 vomiting, abdominal discomfort as well as decreased fatigue.  Blood counts obtained at the time demonstrated a WBC of 0.4, Hgb 8.8 and platelets 125.  ANC at the time was measured at only 170.  JULY was otherwise clinically well appearing.  He did receive a one-time normal saline bolus for his nausea and vomiting and was sent home with instructions to HOLD his oral mercaptopurine and oral methotrexate which began being held on 7/28/2023.  Per protocol, imatinib was continued at previous dose of 600 mg in the morning. Tomas Roy would return to clinic again on 8/2/2023 and 8/7/2023.  On both occasions, ANC remained under 500 and oral therapy (with the exception of imatinib) continue to be held while awaiting count recovery.  Ultimately, on 8/11/2023 after 14 days of therapy being held, Tomas Roy returned to clinic and WBC  had increased to 2.1 with ANC increasing to 1500 with an absolute monocyte count of 290. Tomas Roy was then instructed to resume his oral chemotherapy at 100% of prior dosing with (due to timing with Day 1 of Cycle 2 with LP 3 days later, no weekly MTX was administered).  Imatinib was never held.  On 8/14/2023 he was seen in clinic for Day 1 of Cycle 2 of Maintenance with lumbar puncture, vincristine, and 5-day pulse of steroids.  At the time, his ANC was 2010 and his oral chemotherapy was continued at 100% dosing.  Given his history of previously drop in counts, he was scheduled to come back for repeat labs on 8/29/2023 at which point his WBC count was 1.4 and his ANC remained greater than 500 at 1140.  Again, his therapy was continued with imatinib 550 mg by mouth in the morning as well as 100% dosing of methotrexate and 100% dosing of 6-mercaptopurine.  When Tomas Roy returned to clinic on 9/11/2023 for his Maintenance, Cycle 2, Day 29 therapy he was noted to have had another drop in his WBC to 600 and his ANC accordingly was also decreased at 410.  He did receive vincristine in accordance with protocol as well as starting a 5-day pulse of prednisone per protocol.  Given however that his ANC had dropped below 500 his oral methotrexate and oral 6-MP were again held.  He was instructed return to clinic 1 week later for lab evaluations.  On 9/19/2023 he returned to clinic and WBC had improved slightly to 0.8 however ANC remained low at 260 with an absolute monocyte count of 220.  Given that counts not recovered his oral chemotherapy remained held for an additional week.  On 9/26/2023 at 14 days after initially holding chemotherapy, he was seen back in clinic at which time WBC improved again to 1.1 however ANC did not quite recover and remained at 490 with an absolute monocyte count of 330.  Given that 2 weeks had elapsed with myelosuppression, imatinib was held in addition to oral 6-MP and  methotrexate. Tomas Roy was instructed to return to clinic on 9/29/2023 for repeat counts.  On 9/29/2023 WBC had improved to 2.4 and ANC had finally recovered with 1530 and an absolute monocyte count of 510.  Given a recovery with ANC greater than 750 and platelets greater than 75,000 oral chemotherapy was restarted at 50% of initial dosing and imatinib was restarted at 100% of initial dosing.  On 10/9/2023, Tomas Roy returned to clinic for his Day 57 of Cycle 2 visit.  At the time of his visit, his ANC had recovered after holding chemotherapy and then restarting at 50% dosing 11 days prior.  He did however in clinic spiked a temperature 102.5 °F.  For this reason despite his ANC being improved, no dose adjustments were made in his chemotherapy.  He has since continued with 50% dosing with 100% adherence of his 6-MP and methotrexate.  He has continued with 100% adherence of his Imatinib at 550 mg PO QHS.  He was seen in infusion center on 11/6/2023 for Day 1 of maintenance cycle 3 chemotherapy. He received VCR and LP with IT chemotherapy. Imatinib dose was increased to 600 mg PO Q AM for increase in BSA. Given that his ANC was >1500/microliters x 2, the doses of oral 6-MP and MTx were increased  to 75% of dose prescribed prior to stopping. He was instructed to take 10.5 tablets of MTx weekly and 2 tablets daily of oral 6-MP . Also, sent home with oral prednisone x 5 days. Interval history remains unremarkable.     Tomas Roy reports that he has overall been doing well. No reports of fever or interim illnesses. No cough, congestion or difficulty breathing. No nausea, vomiting or abdominal pain/distension. Stooling and voiding well. He is eating and drinking well. Energy and activity are good. He reports that he has been in school full-time and is doing well overall. He sometimes feels that he is not able to remember things well. He reports that he continues to to be able to study and focus.  No  complaints of any headaches, change in vision or neurologic status changes. No complaints of any skin changes or rashes.  Reports 100% adherence with his 6-MP which he is taking at 75% of expected dose, 2 tablets daily.  He also reports 100% adherence with his methotrexate which he is taking at 75% of expected dosing at 10.5 tablets weekly.  Additionally, he reports 100% adherence with his imatinib at 600 mg PO QAM. No other concerns or complaints at this time.       Review of Systems:     Constitutional: Afebrile.  No recent or remote illness.  Energy and activity are baseline.  Eating and drinking well with good appetite and oral intake.  HENT: Negative for auditory changes, nosebleeds and sore throat.  No mouth sores.  Eyes: Negative for visual changes.  Respiratory: Negative for  shortness of breath.  No cough.  Cardiovascular: Negative.  Gastrointestinal: Negative for nausea, vomiting, abdominal pain, diarrhea, constipation.  Genitourinary: Negative.  Musculoskeletal: Negative.  Skin: Negative for rash, signs of infection.  Neurological: Negative for numbness, tingling, sensory changes, weakness or headaches.  Reports improved neuropathy however still present.  Endo/Heme/Allergies: Does not bruise/bleed easily.    Psychiatric/Behavioral: No changes in mood, appropriate for age.      PAST MEDICAL HISTORY:     Oncologic History:  2-3 week history of worsening fatigue, right lower extremity pain  Presentation to OS and diagnosed with right LE superficial thrombus, subsegmental PE and hyperleukocytosis, anemia and thrombocytopenia  Transferred to [facility] for definitive care  Presenting (local) WBC > 440K, Hgb 10.0, platelets 53, (automated differential ANC 3190, ALC 75,310, absolute monocyte count 55374, absolute blast count 340,560)  Uric Acid 15.6, phosphorus 5.6, LDH 1114  Rasburicase x 1 dose given   Peripheral Blood flow cytometry demonstrating CD10 pos, CD19 pos, CD20 neg, CD22 dim (60%) 5/28/2022  Peripheral  blood FISH for BCR-ABL1 positive in 98% of analyzed cells     Age at Diagnosis: 21 years  White Blood Cell Count at Presentation: > 440 k/uL  Testicular Disease Status: Negative (see procedure note 5/30/2022)  CNS Status: 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, diagnostic LP with WBC 1, RBC 3 and no evidence of leukemic blasts 5/30/2022  Steroid Pre-treatment: None  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     All inclusion/exclusion criteria for [id number] met and consent signed - enrolled 5/29/2022   All inclusion/exclusion criteria for [id number] met and consent signed - enrolled 5/30/2022  Confirmatory bone marrow aspirate and biopsy and diagnostic LP + cytarabine 70 mg IT 5/30/2022  Induction therapy (ON STUDY [id number]) started 5/30/2022  Bone marrow immunohistochemistry consistent with diagnosis of B-ALL comprising 90% of marrow cellularity  Bone marrow sample sent to [facility] for [facility] purposes:  Flow cytom  Of the blood pressure little high that is a problem is a cultural problem is well and cultural genetic and everything else like that unfortunately breathalyzers such bad heart disease diabetes things like that  populations etry consistent with peripheral blood, cytogenetics remarkable for known t(9;22)  CSF with WBC 1, RBC 3, no blasts identified on cytospin  FISH results available 5/31/2022 making patient eligible for transfer from [place] [address] to [place] [address] as eligibility requirements were met for [place] [address]  Patient unenrolled from [place] [address] (BCR-ABL1 fusion positive) 6/1/2022  Consent signed for [place] [id number] and patient enrolled 6/1/2022 (see eligibility checklist from 5/31/2022 and consent note from 6/1/2022)  Imatinib 400 mg PO QAM / 200 mg PO QPM started 6/3/2022 (allowed per [place] [id number])  Patient completed the first 15 days of a Standard 4-drug Induction on 6/13/2022  Start of [id number](OS), Induction IA Part 2, Day 15  "6/13/2022  End of Induction 1A Part 2 - MRD negative  Start of [facility] [id number](OS), Induction IA Part 2, Day 15 7/5/2022  Induction IB DELAYED 2 weeks 14 days from 7/26/2022-8/9/2022) for myelosuppression - Start of Day 22 cytarabine block 8/9/2022  Induction IB Day 42 delayed 9 days for myelosuppression - Day 42 evaluations 9/7/2022  End of Induction IB - Flow cytometric MRD negative, MRD by IgH-TCR PCR [id number]%  Randomization to AR-Experimental Arm B of [id number]  Start of AR-Experimental Arm B, Interim Maintenance 9/29/2022  Weight based increase in dose of imatinib to 400 mg PO AM and 250 mg PM 9/29/2022  Thrombocytopenia not permissive of proceeding with Day 15 of Interim Maintenance  AR-Experimental Arm B, Interim Maintenance, Day 15 delayed 4 days, start 10/17/2022  AR-Experimental Arm B, Interim Maintenance, Day 29, start 11/1/2022  AR-Experimental Arm B, Interim Maintenance, Day 43, start 11/14/2022  Last does of 6-MP 11/27/2022  AR-Experimental Arm B, Delayed Intensification, Day 1, start 12/6/2022  Admission with Severe Septic Shock 12/27/2022  Imatinib HELD 12/27/2022-1/8/2023  AR-Experimental Arm B, Delayed Intensification, Day 29 DELAYED 14 days with start 1/17/2023  Hospitalization 2/7/2023 on Day 50 of Delayed Intensification with left shoulder pain ultimately diagnosed with Bacteroides fragilis infection  AR-Experimental Arm B, Interim Maintenance, Day 1 DELAYED 7 days with start 2/27/2023  AR-Experimental Arm B, Interim Maintenance, Day 11 DELAYED 4 days for platelets 43K on 3/9/2023  AR-Experimental Arm B, Interim Maintenance, Day 11 VCR given and MTX omitted for platelets 43K 3/13/2023  Imatinib HELD 3/30/2023-4/11/2023  AR-Experimental Arm B, Interim Maintenance, Day 21 (DELAYED 22 DAYS) - administered 4/11/2023 with methotrexate 80 mg/m² IV  AR-Experimental Arm B, Interim Maintenance, Day 31 (\"true\" Day 31 4/25/2023) - VCR and IT MTX given 4/28/2023 - IV MTX omitted  AR-Experimental Arm B, " Interim Maintenance, Day 41 met with counts - administered 5/5/2023 with methotrexate 80 mg/m² IV     Start of Maintenance, Cycle 1, Day 1 -5/22/2023  Cranial radiation 6/5/2023-6/16/2023 (10 fractions)  Maintenance, Cycle 1, Day 29 - 6/23/2023 (no IT MTX given)  Maintenance, Cycle 1, Day 67, presented with fatigue nausea and vomiting found to have ANC less than 500  Methotrexate (oral) and mercaptopurine held 7/27/2023 - continued with imatinib  Methotrexate (oral) and mercaptopurine held 8/2/2023 - continued with imatinib  Methotrexate (oral) and mercaptopurine held 8/7/2023 - continued with imatinib  Restarted methotrexate (oral) and mercaptopurine at 100% previous dosing on 8/11/2023 - continued with imatinib  Maintenance, Cycle 2, Day 1 - 8/14/2023  Maintenance, Cycle 2, Day 29 - 9/11/2023  Methotrexate (oral) and mercaptopurine held 9/11/2023 - continued with imatinib  Methotrexate (oral) and mercaptopurine held 9/19/2023 - continued with imatinib  Methotrexate (oral) and mercaptopurine held 9/26/2023 - HELD imatinib  Methotrexate (oral) restarted at 50% dosing and mercaptopurine restarted at 50% dosing 9/29/2023 - RESTARTED imatinib  Maintenance, Cycle 2, Day 57 - 10/9/2023  Maintenance, Cycle 3, Day 1 - 11/6/2023: Methotrexate (oral) increased to 75% dosing and mercaptopurine increased to 75% dosing.  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  3) Hyperleukocytosis  4) Hyperuricemia  5) Hyperphosphatemia  6) Right Lower Extremity Superficial Thrombus  7) Subsegmental Pulmonary Embolism  8) 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  9) Bacteroides fragilis soft tissue infection left shoulder     Past Surgical History:     1) Temporary Right IJ Pharesis Catheter Placement 5/28/2022  2) Right-sided Port-A-Cath placement  8/29/2022  3) IR drainage left calf hematoma  4) Left shoulder I&D  5) Cranial XRT 6/5/2023-6/16/2023     Birth/Developmental History:   1st of three children  Unremarkable pregnancy  Unremarkable delivery     Allergies:             Allergies as of 05/27/2022 - Reviewed 05/27/2022   Allergen Reaction Noted    Amoxicillin   04/03/2020      Social History:   Lives at home with mother, brother and sister.  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   Two dogs. Father not involved.     Family History:     Family History             Family History   Problem Relation Age of Onset    No Known Problems Mother      Diabetes Paternal Grandfather      Hypertension Paternal Grandfather      Hyperlipidemia Paternal Grandfather      Cancer Neg Hx      Heart Disease Neg Hx      Stroke Neg Hx           No significant family history of cancer.  Both maternal and paternal family history of diabetes.     Immunizations:  UTD  Medications:   Current Outpatient Medications on File Prior to Encounter   Medication Sig Dispense Refill    predniSONE (DELTASONE) 10 MG Tab Take 3.5 tablets by mouth in the morning and evening for 5 Days at Day 1, 29 and 57 of each cycle. 35 Tablet 6    mercaptopurine (PURINETHOL) 50 MG Tab Take 2 tablets by mouth in the evening x 7 days of the week. 64 Tablet 5    imatinib (GLEEVEC) 400 MG tablet Take 1.5 Tablets by mouth every morning for 30 days. Pt takes 200 mg + 400 mg for a total dose of 600 mg daily 45 Tablet 3    methotrexate 2.5 MG tablet Take 10.5 Tablets by mouth every 7 days for 30 days. 42 Tablet 0    omeprazole (PRILOSEC) 20 MG delayed-release capsule Take 1 Capsule by mouth every day for 180 days. 30 Capsule 5    LORazepam (ATIVAN) 1 MG Tab Take 1 mg by mouth every four hours as needed for Anxiety.      sulfamethoxazole-trimethoprim (BACTRIM DS) 800-160 MG tablet Take 2 Tablets by mouth twice daily two days a week. (Patient taking differently: Take 1 Tablet by mouth 2 times a day.) 48 Tablet 11    Melatonin 5 MG  "Cap Take 5 mg by mouth at bedtime as needed.      ondansetron (ZOFRAN ODT) 8 MG TABLET DISPERSIBLE Dissolve 1 Tablet by mouth every 6 hours as needed for Nausea/Vomiting. 10 Tablet 0     OBJECTIVE:     Vitals:   /66   Pulse (!) 107   Temp 36.6 °C (97.8 °F) (Temporal)   Resp 18   Ht 1.655 m (5' 5.16\")   Wt 67.4 kg (148 lb 9.4 oz)   SpO2 98%     Labs:    Hospital Outpatient Visit on 12/04/2023   Component Date Value    WBC 12/04/2023 2.1 (L)     RBC 12/04/2023 3.14 (L)     Hemoglobin 12/04/2023 10.9 (L)     Hematocrit 12/04/2023 33.1 (L)     MCV 12/04/2023 105.4 (H)     MCH 12/04/2023 34.7 (H)     MCHC 12/04/2023 32.9     RDW 12/04/2023 58.9 (H)     Platelet Count 12/04/2023 173     MPV 12/04/2023 9.7     Neutrophils-Polys 12/04/2023 71.30     Lymphocytes 12/04/2023 1.70 (L)     Monocytes 12/04/2023 11.30     Eosinophils 12/04/2023 13.90 (H)     Basophils 12/04/2023 0.90     Nucleated RBC 12/04/2023 0.00     Neutrophils (Absolute) 12/04/2023 1.50 (L)     Lymphs (Absolute) 12/04/2023 0.04 (L)     Monos (Absolute) 12/04/2023 0.24     Eos (Absolute) 12/04/2023 0.29     Baso (Absolute) 12/04/2023 0.02     NRBC (Absolute) 12/04/2023 0.00     Anisocytosis 12/04/2023 1+     Macrocytosis 12/04/2023 1+     Sodium 12/04/2023 140     Potassium 12/04/2023 3.8     Chloride 12/04/2023 106     Co2 12/04/2023 22     Anion Gap 12/04/2023 12.0     Glucose 12/04/2023 101 (H)     Bun 12/04/2023 8     Creatinine 12/04/2023 0.45 (L)     Calcium 12/04/2023 8.7     Correct Calcium 12/04/2023 8.3 (L)     AST(SGOT) 12/04/2023 20     ALT(SGPT) 12/04/2023 22     Alkaline Phosphatase 12/04/2023 95     Total Bilirubin 12/04/2023 0.4     Albumin 12/04/2023 4.5     Total Protein 12/04/2023 6.7     Globulin 12/04/2023 2.2     A-G Ratio 12/04/2023 2.0     Direct Bilirubin 12/04/2023 <0.2     Indirect Bilirubin 12/04/2023 see below     GFR (CKD-EPI) 12/04/2023 152     Metamyelocytes 12/04/2023 0.90     Manual Diff Status 12/04/2023 " PERFORMED     Peripheral Smear Review 12/04/2023 see below     Plt Estimation 12/04/2023 Decreased     RBC Morphology 12/04/2023 Present     Poikilocytosis 12/04/2023 1+     Ovalocytes 12/04/2023 1+        Physical Exam:    Constitutional: Well-developed, well-nourished, and in no distress.  Very well-appearing.  HENT: Normocephalic and atraumatic. No nasal congestion or rhinorrhea. Oropharynx is clear and moist. No oral ulcerations or sores.  Scalp hair grown well.  Eyes: Conjunctivae are normal. Pupils are equal, round.  EOMI.  Nonicteric.  Neck: Normal range of motion of neck, no adenopathy.    Cardiovascular: Normal rate, regular rhythm and normal heart sounds.  No murmur heard. DP/radial pulses 2+, cap refill < 2 sec.  Pulmonary/Chest: Effort normal and breath sounds normal. No respiratory distress. Symmetric expansion.  No crackles or wheezes. PAC to chest wall. Dressing C/D/I.  Abdomen: Soft. Bowel sounds are normal. No distension and no mass. There is no hepatosplenomegaly.    Genitourinary:  Deferred  Musculoskeletal: Normal range of motion of lower and upper extremities bilaterally. No tenderness to palpation of elbows, wrists, hands, knees, ankles and feet bilaterally.   Neurological: Alert and oriented to person and place. Exhibits normal muscle tone bilaterally in upper and lower extremities. Gait normal. Coordination normal.    Skin: Skin is warm, dry and pink.  No rash or evidence of skin infection.  No pallor.   Psychiatric: Mood and affect normal for age.     ASSESSMENT AND PLAN:     Tomas Jean-Baptiste is a previously healthy 23 year old male with  Precursor B-Cell Lymphoblastic Leukemia with BCR-ABL1 fusion and whose End of Induction IB MRD was both negative by flow cytometry and PCR who presents for Maintenance, Cycle 3, Day 29     1) Ph+ Precursor B-Cell Acute Lymphoblastic Leukemia, in MRD Remission:  - 2-3 weeks of symptoms  - Presenting WBC > 440 k/uL, hyperleukocytosis  - Start of  Hydroxyurea (1500 mg PO Q8) 2320 on 5/27/2022  - discontinued after only 55 hours  - No steroid pretreatment  - 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  - Testicular status NEGATIVE       - Flow cytometry of both 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, FISH positive for BCR-ABL1 translocation  - Enrolled on [facility] [id number]  - Initially enrolled on [facility] [id number] - but 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                            - Enrolled on [facility] [id number] and began study 6/13/2022              - Started imatinib therapy 6/3/2022   - End of Induction IA and IB MRD negative  - Imatinib dose increased to 400 mg PO AM and 250 mg PM 9/29/2022   -* Note:  All imatinib doses given inpatient rounded down to 600 mg   - Imatinib held for Septic Shock 12/27/2022-1/8/2023   - Imatinib 600 mg PO daily restarted 1/8/2023 inpatient   - Imatinib 400 mg PO QAM and 250 mg PO QHS 1/14/2023-1/17/2023   - Imatinib 600 mg PO QAM 1/17/2023 - 3/30/2023   - Imatinib 600 mg PO QAM 4/11/2023 - 8/13/2023   - Imatinib 550 mg PO QAM 8/14/2023 - 9/26/2023   - Imatinib 550 mg PO QAM 9/29/2023 -11/6/2023   - Imatinib 600 mg PO QAM 11/6/2023 - PRESENT     - Imatinib dosing changed every 12 weeks. Continue Imatinib at 600 mg PO QAM                 - WBC 2100/microliters, Hgb 10.9 gm/dL, platelets 173,000/microliters             - ANC 1500/microliters, ALC 40/microliters, absolute monocyte count 240/microliters                - ANC exactly at 1500/microliters today. His total white cell count and ANC has dropped compared to 4 weeks ago when his oral chemotherapy was increased. Will hold off on increasing the dose today as his bone marrow has been very sensitive to changes to oral chemotherapy dosage in the past.       [id number](OS), AR Arm B, Maintenance, Cycle 3, Day 29:   ** Continue Imatinib at 600 mg PO QAM  ** Vincristine 2 mg IV x 1 dose (GIVEN TODAY)  ** Begin pulse of prednisone 35mg PO QAM  and 35 mg PO QHS x 5 days           ** Continue oral MTX to 10.5 tablets PO weekly   ** Continue oral 6-mercaptopurine 2 tablets PO daily x 7 days      - Return to infusion center in 1 month for Cycle 3, Day 57     2) Chemotherapy Related Pancytopenia:           - WBC 2100/microliters, Hgb 10.9 gm/dL, platelets 173,000/microliters           - ANC 1500/microliters, ALC 40/microliters, absolute monocyte count 240/microliters  - Transfuse for hemoglobin less than 7 gm/dL or symptomatic  - Transfuse for platelets less than 10,000/microliters or symptomatic  - No transfusions necessary today     3) At Risk of Opportunistic Lung Infection:  - Bactrim DS PO BID Sat and Sun for PJP prophylaxis     4) Central Access:   - R Port-A-Cath in place      5) Research Participant: ON STUDY [id number], AR Arm B, Maintenance, Cycle 3, Day 29             [facility] - Source Data         Diagnosis: Ph+ Precursor B-Cell Acute Lymphoblastic Leukemia     Disease Status: EOI1A MRD NEGATIVE, EOI1B RD NEGATIVE, CNS3c, testicular negative, HSV1 IgG POSITIVE, CMV IgG NEGATIVE, VARICELLA IgG POSITIVE     Active Studies: [id number], [id number]                                                                                                      Inactive Studies: [id number]                                                                                                                                                Optional Studies: None             Protocol: International Phase 3 trial in [place] 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      Treatment Plan: [id number](OS), AR-Arm B, Maintenance, Cycle 3, Day 29 (12/4/2023)     Height: 1.65 m     Weight: 67.4 kg     BSA: 1.76 m²   (Maintenance, Cycle 3,   Day 29, 12/4/2023: Height and weight updated per protocol)                                                                                                                                            Performance Status: Karnofsky 90, ECOG 1 (11/6/2023)     Treatment Plan Medications:  (100% adherent)     Continue oral 6- mercaptopurine 2 tabs x 7 days   Continue oral MTX to 10.5 tabs weekly   Continue Imatinib to 600 mg PO QHS      Evaluations / Study Labs:  (12/4/2023)     - WBC 2100/microliters, Hgb 10.9 gm/dL, platelets 173,000/microliters  - ANC 1500/microliters, ALC 40/microliters, absolute monocyte count 240/microliters  - T. Bilirubin: 0.4 mg/dL, Direct Bilirubin: <0.2 mg/dL, AST: 20 U/L, ALT: 22 U/L     Therapy Given: (12/4/2023)     Oral chemotherapy as above  Vincristine 2 mg IV x 1  Start 5 days pulse of oral Prednisone     Maintenance, Cycle 3 Toxicities:     None         Disposition: Return to infusion center in 1 month for Day 57 of Cycle 3 of Maintenance       Alyce Duenas MD  Pediatric Hematology / Oncology  [facility]  Cell. [phone]  [facility] [phone]

## 2023-12-06 RX ORDER — ONDANSETRON 2 MG/ML
8 INJECTION INTRAMUSCULAR; INTRAVENOUS EVERY 8 HOURS PRN
Status: CANCELLED | OUTPATIENT
Start: 2024-01-02

## 2023-12-06 RX ORDER — ONDANSETRON 2 MG/ML
8 INJECTION INTRAMUSCULAR; INTRAVENOUS ONCE
Status: CANCELLED | OUTPATIENT
Start: 2024-01-02

## 2023-12-06 RX ORDER — PROMETHAZINE HYDROCHLORIDE 6.25 MG/5ML
0.25 SYRUP ORAL EVERY 6 HOURS PRN
Status: CANCELLED | OUTPATIENT
Start: 2024-01-02

## 2023-12-07 ENCOUNTER — PHARMACY VISIT (OUTPATIENT)
Dept: PHARMACY | Facility: MEDICAL CENTER | Age: 23
End: 2023-12-07
Payer: COMMERCIAL

## 2023-12-07 ENCOUNTER — PATIENT OUTREACH (OUTPATIENT)
Dept: HEALTH INFORMATION MANAGEMENT | Facility: OTHER | Age: 23
End: 2023-12-07
Payer: MEDICAID

## 2023-12-07 ENCOUNTER — TELEPHONE (OUTPATIENT)
Dept: PHARMACY | Facility: MEDICAL CENTER | Age: 23
End: 2023-12-07
Payer: MEDICAID

## 2023-12-07 ENCOUNTER — PATIENT MESSAGE (OUTPATIENT)
Dept: HEALTH INFORMATION MANAGEMENT | Facility: OTHER | Age: 23
End: 2023-12-07

## 2023-12-07 PROCEDURE — RXMED WILLOW AMBULATORY MEDICATION CHARGE: Performed by: PEDIATRICS

## 2023-12-07 NOTE — PROGRESS NOTES
Medical Social Work    Referral: Pediatric Hematology/Oncology    SW completed outreach with patient. Patient states he is doing well, and is currently getting through finals week before fall semester comes to a close. Patient states he and his family don't have any big plans for the holidays. SW encouraged patient to take time for himself following the completion of this semester, and to celebrate the medical hurdles he has gotten through this past year.     Patient states he has no other concerns or questions.    Plan: SW will continue to follow and provide support as needed.

## 2023-12-07 NOTE — TELEPHONE ENCOUNTER
Contact:  Phone number:[phone] (mobile)    Name of person spoken with and relationship to patient: Tomas Jean-Baptiste   Patient’s Adherence:  How patient is doing on medication: Well    How many missed doses and reason: 0 N/A    Any new medications: No    Any new conditions: No    Any new allergies: No    Any new side effects: No    Any new diagnoses: No    How many doses remaining: at least a week    Did patient want to speak with pharmacist: No   Delivery:  Delivery date and method: 12/11/23 via     Needs by Date: 12/11/23    Signature required: No     Any additional details for : N/A   Teach Appointment Date:  N/A   Shipping Address:  [address] [place] [address]   Medication(name,strength and dose):  mercaptopurine (PURINETHOL) 50 MG Tab, omeprazole (PRILOSEC) 20 MG delayed-release capsule, and predniSONE (DELTASONE) 10 MG Tab   Copay:  $0   Payment Method:  n/a $0 copay   Supplies:  NA   Additional Information:  MARIAELENA Benito [facility]   Pharmacy Liaison  [phone]  12/7/2023 1:09 PM

## 2023-12-11 DIAGNOSIS — Z51.11 ENCOUNTER FOR CHEMOTHERAPY MANAGEMENT: ICD-10-CM

## 2023-12-12 PROCEDURE — RXMED WILLOW AMBULATORY MEDICATION CHARGE: Performed by: PEDIATRICS

## 2023-12-12 RX ORDER — METHOTREXATE 2.5 MG/1
26.25 TABLET ORAL
Qty: 42 TABLET | Refills: 0 | Status: SHIPPED | OUTPATIENT
Start: 2023-12-12 | End: 2024-01-02 | Stop reason: SDUPTHER

## 2023-12-14 ENCOUNTER — TELEPHONE (OUTPATIENT)
Dept: PHARMACY | Facility: MEDICAL CENTER | Age: 23
End: 2023-12-14
Payer: MEDICAID

## 2023-12-14 NOTE — TELEPHONE ENCOUNTER
Contact:  Phone number:[phone] (mobile)    Name of person spoken with and relationship to patient: JULY (Self)   Patient’s Adherence:  How patient is doing on medication: Well    How many missed doses and reason: 0 N/A    Any new medications: No    Any new conditions: No    Any new allergies: No    Any new side effects: No    Any new diagnoses: No    How many doses remaining: Between 1-2 weeks    Did patient want to speak with pharmacist: No   Delivery:  Delivery date and method: 12/18/23 via     Needs by Date: 12/18/23    Signature required: No     Any additional details for : N/A   Teach Appointment Date:  N/A   Shipping Address:  [address] [place] [address]   Medication(name,strength and dose):  imatinib (GLEEVEC) 400 MG tablet and methotrexate 2.5 MG tablet   Copay:  $0   Payment Method:  n/a $0 copay   Supplies:  NA   Additional Information:  MARIAELENA Benito [facility]   Pharmacy Liaison  [phone]  12/14/2023 12:52 PM

## 2023-12-18 ENCOUNTER — PHARMACY VISIT (OUTPATIENT)
Dept: PHARMACY | Facility: MEDICAL CENTER | Age: 23
End: 2023-12-18
Payer: COMMERCIAL

## 2023-12-21 ENCOUNTER — HOSPITAL ENCOUNTER (OUTPATIENT)
Dept: INFUSION CENTER | Facility: MEDICAL CENTER | Age: 23
End: 2023-12-21
Attending: PEDIATRICS
Payer: COMMERCIAL

## 2023-12-21 VITALS
TEMPERATURE: 98 F | WEIGHT: 143.3 LBS | BODY MASS INDEX: 23.88 KG/M2 | SYSTOLIC BLOOD PRESSURE: 125 MMHG | DIASTOLIC BLOOD PRESSURE: 77 MMHG | OXYGEN SATURATION: 95 % | HEIGHT: 65 IN | HEART RATE: 117 BPM | RESPIRATION RATE: 20 BRPM

## 2023-12-21 DIAGNOSIS — C91.Z0 B LYMPHOBLASTIC LEUKEMIA WITH T(9;22)(Q34;Q11.2);BCR-ABL1 (HCC): ICD-10-CM

## 2023-12-21 LAB
ALBUMIN SERPL BCP-MCNC: 4.2 G/DL (ref 3.2–4.9)
ALBUMIN/GLOB SERPL: 1.6 G/DL
ALP SERPL-CCNC: 73 U/L (ref 30–99)
ALT SERPL-CCNC: 23 U/L (ref 2–50)
ANION GAP SERPL CALC-SCNC: 11 MMOL/L (ref 7–16)
AST SERPL-CCNC: 21 U/L (ref 12–45)
BASOPHILS # BLD AUTO: 1.1 % (ref 0–1.8)
BASOPHILS # BLD: 0.02 K/UL (ref 0–0.12)
BILIRUB SERPL-MCNC: 0.5 MG/DL (ref 0.1–1.5)
BUN SERPL-MCNC: 8 MG/DL (ref 8–22)
CALCIUM ALBUM COR SERPL-MCNC: 8.4 MG/DL (ref 8.5–10.5)
CALCIUM SERPL-MCNC: 8.6 MG/DL (ref 8.5–10.5)
CHLORIDE SERPL-SCNC: 102 MMOL/L (ref 96–112)
CO2 SERPL-SCNC: 23 MMOL/L (ref 20–33)
CREAT SERPL-MCNC: 0.52 MG/DL (ref 0.5–1.4)
EOSINOPHIL # BLD AUTO: 0.15 K/UL (ref 0–0.51)
EOSINOPHIL NFR BLD: 8.6 % (ref 0–6.9)
ERYTHROCYTE [DISTWIDTH] IN BLOOD BY AUTOMATED COUNT: 56.8 FL (ref 35.9–50)
GFR SERPLBLD CREATININE-BSD FMLA CKD-EPI: 146 ML/MIN/1.73 M 2
GLOBULIN SER CALC-MCNC: 2.7 G/DL (ref 1.9–3.5)
GLUCOSE SERPL-MCNC: 78 MG/DL (ref 65–99)
HCT VFR BLD AUTO: 30.5 % (ref 42–52)
HGB BLD-MCNC: 10.3 G/DL (ref 14–18)
IMM GRANULOCYTES # BLD AUTO: 0.06 K/UL (ref 0–0.11)
IMM GRANULOCYTES NFR BLD AUTO: 3.4 % (ref 0–0.9)
LYMPHOCYTES # BLD AUTO: 0.12 K/UL (ref 1–4.8)
LYMPHOCYTES NFR BLD: 6.9 % (ref 22–41)
MCH RBC QN AUTO: 33.9 PG (ref 27–33)
MCHC RBC AUTO-ENTMCNC: 33.8 G/DL (ref 32.3–36.5)
MCV RBC AUTO: 100.3 FL (ref 81.4–97.8)
MONOCYTES # BLD AUTO: 0.32 K/UL (ref 0–0.85)
MONOCYTES NFR BLD AUTO: 18.4 % (ref 0–13.4)
NEUTROPHILS # BLD AUTO: 1.07 K/UL (ref 1.82–7.42)
NEUTROPHILS NFR BLD: 61.6 % (ref 44–72)
NRBC # BLD AUTO: 0 K/UL
NRBC BLD-RTO: 0 /100 WBC (ref 0–0.2)
PLATELET # BLD AUTO: 181 K/UL (ref 164–446)
PMV BLD AUTO: 10.4 FL (ref 9–12.9)
POTASSIUM SERPL-SCNC: 4 MMOL/L (ref 3.6–5.5)
PROT SERPL-MCNC: 6.9 G/DL (ref 6–8.2)
RBC # BLD AUTO: 3.04 M/UL (ref 4.7–6.1)
SODIUM SERPL-SCNC: 136 MMOL/L (ref 135–145)
WBC # BLD AUTO: 1.7 K/UL (ref 4.8–10.8)

## 2023-12-21 PROCEDURE — 85025 COMPLETE CBC W/AUTO DIFF WBC: CPT

## 2023-12-21 PROCEDURE — 80053 COMPREHEN METABOLIC PANEL: CPT

## 2023-12-21 PROCEDURE — 700111 HCHG RX REV CODE 636 W/ 250 OVERRIDE (IP): Mod: UD | Performed by: PEDIATRICS

## 2023-12-21 PROCEDURE — 36591 DRAW BLOOD OFF VENOUS DEVICE: CPT

## 2023-12-21 PROCEDURE — A4212 NON CORING NEEDLE OR STYLET: HCPCS

## 2023-12-21 RX ORDER — 0.9 % SODIUM CHLORIDE 0.9 %
20 VIAL (ML) INJECTION PRN
Status: CANCELLED | OUTPATIENT
Start: 2023-12-21

## 2023-12-21 RX ORDER — HEPARIN SODIUM,PORCINE 10 UNIT/ML
30 VIAL (ML) INTRAVENOUS PRN
Status: CANCELLED
Start: 2023-12-21

## 2023-12-21 RX ADMIN — HEPARIN 500 UNITS: 100 SYRINGE at 11:52

## 2023-12-21 ASSESSMENT — FIBROSIS 4 INDEX: FIB4 SCORE: 0.54

## 2023-12-21 NOTE — PROGRESS NOTES
Pt to [facility] for lab draw.  Afebrile.  VSS.  Awake and alert in no acute distress however, patient arrived with complaints of cough.     Dr. Faye to bedside. Visit completed. Patient to monitor symptoms and notify MD if worsening.    Port accessed with 22 g 3/4in and labs drawn from the port without difficulty / with 1 attempt per Yaniv Charles RN.   Pt tolerated well. Port flushed per orders (see MAR) and port de-accessed.  Plan to follow up with Dr. Faye for results.    Will return 01/02/2024 for chemotherapy.

## 2024-01-02 ENCOUNTER — HOSPITAL ENCOUNTER (OUTPATIENT)
Dept: INFUSION CENTER | Facility: MEDICAL CENTER | Age: 24
End: 2024-01-02
Attending: PEDIATRICS
Payer: COMMERCIAL

## 2024-01-02 ENCOUNTER — TELEPHONE (OUTPATIENT)
Dept: PHARMACY | Facility: MEDICAL CENTER | Age: 24
End: 2024-01-02
Payer: MEDICAID

## 2024-01-02 VITALS
OXYGEN SATURATION: 100 % | BODY MASS INDEX: 23.4 KG/M2 | RESPIRATION RATE: 18 BRPM | WEIGHT: 140.43 LBS | DIASTOLIC BLOOD PRESSURE: 64 MMHG | HEART RATE: 90 BPM | TEMPERATURE: 98.2 F | HEIGHT: 65 IN | SYSTOLIC BLOOD PRESSURE: 99 MMHG

## 2024-01-02 DIAGNOSIS — Z51.11 ENCOUNTER FOR CHEMOTHERAPY MANAGEMENT: ICD-10-CM

## 2024-01-02 DIAGNOSIS — C91.01 ACUTE LYMPHOBLASTIC LEUKEMIA (ALL) IN REMISSION (HCC): ICD-10-CM

## 2024-01-02 DIAGNOSIS — C91.Z0 B LYMPHOBLASTIC LEUKEMIA WITH T(9;22)(Q34;Q11.2);BCR-ABL1 (HCC): ICD-10-CM

## 2024-01-02 LAB
ALBUMIN SERPL BCP-MCNC: 4 G/DL (ref 3.2–4.9)
ALBUMIN/GLOB SERPL: 1.9 G/DL
ALP SERPL-CCNC: 70 U/L (ref 30–99)
ALT SERPL-CCNC: 26 U/L (ref 2–50)
ANION GAP SERPL CALC-SCNC: 11 MMOL/L (ref 7–16)
AST SERPL-CCNC: 20 U/L (ref 12–45)
BASOPHILS # BLD AUTO: 0.9 % (ref 0–1.8)
BASOPHILS # BLD: 0.02 K/UL (ref 0–0.12)
BILIRUB CONJ SERPL-MCNC: <0.2 MG/DL (ref 0.1–0.5)
BILIRUB INDIRECT SERPL-MCNC: NORMAL MG/DL (ref 0–1)
BILIRUB SERPL-MCNC: 0.5 MG/DL (ref 0.1–1.5)
BUN SERPL-MCNC: 7 MG/DL (ref 8–22)
CALCIUM ALBUM COR SERPL-MCNC: 8.4 MG/DL (ref 8.5–10.5)
CALCIUM SERPL-MCNC: 8.4 MG/DL (ref 8.5–10.5)
CHLORIDE SERPL-SCNC: 108 MMOL/L (ref 96–112)
CO2 SERPL-SCNC: 24 MMOL/L (ref 20–33)
CREAT SERPL-MCNC: 0.58 MG/DL (ref 0.5–1.4)
EOSINOPHIL # BLD AUTO: 0.02 K/UL (ref 0–0.51)
EOSINOPHIL NFR BLD: 0.9 % (ref 0–6.9)
ERYTHROCYTE [DISTWIDTH] IN BLOOD BY AUTOMATED COUNT: 58.4 FL (ref 35.9–50)
GFR SERPLBLD CREATININE-BSD FMLA CKD-EPI: 141 ML/MIN/1.73 M 2
GLOBULIN SER CALC-MCNC: 2.1 G/DL (ref 1.9–3.5)
GLUCOSE SERPL-MCNC: 89 MG/DL (ref 65–99)
HCT VFR BLD AUTO: 35.4 % (ref 42–52)
HGB BLD-MCNC: 11.4 G/DL (ref 14–18)
IMM GRANULOCYTES # BLD AUTO: 0.01 K/UL (ref 0–0.11)
IMM GRANULOCYTES NFR BLD AUTO: 0.5 % (ref 0–0.9)
LYMPHOCYTES # BLD AUTO: 0.33 K/UL (ref 1–4.8)
LYMPHOCYTES NFR BLD: 14.9 % (ref 22–41)
MCH RBC QN AUTO: 32.9 PG (ref 27–33)
MCHC RBC AUTO-ENTMCNC: 32.2 G/DL (ref 32.3–36.5)
MCV RBC AUTO: 102 FL (ref 81.4–97.8)
MONOCYTES # BLD AUTO: 0.39 K/UL (ref 0–0.85)
MONOCYTES NFR BLD AUTO: 17.6 % (ref 0–13.4)
NEUTROPHILS # BLD AUTO: 1.45 K/UL (ref 1.82–7.42)
NEUTROPHILS NFR BLD: 65.2 % (ref 44–72)
NRBC # BLD AUTO: 0 K/UL
NRBC BLD-RTO: 0 /100 WBC (ref 0–0.2)
PLATELET # BLD AUTO: 265 K/UL (ref 164–446)
PMV BLD AUTO: 10 FL (ref 9–12.9)
POTASSIUM SERPL-SCNC: 3.7 MMOL/L (ref 3.6–5.5)
PROT SERPL-MCNC: 6.1 G/DL (ref 6–8.2)
RBC # BLD AUTO: 3.47 M/UL (ref 4.7–6.1)
SODIUM SERPL-SCNC: 143 MMOL/L (ref 135–145)
WBC # BLD AUTO: 2.2 K/UL (ref 4.8–10.8)

## 2024-01-02 PROCEDURE — 85025 COMPLETE CBC W/AUTO DIFF WBC: CPT

## 2024-01-02 PROCEDURE — 99214 OFFICE O/P EST MOD 30 MIN: CPT | Performed by: PEDIATRICS

## 2024-01-02 PROCEDURE — 700105 HCHG RX REV CODE 258: Mod: UD | Performed by: PEDIATRICS

## 2024-01-02 PROCEDURE — 96375 TX/PRO/DX INJ NEW DRUG ADDON: CPT

## 2024-01-02 PROCEDURE — 700111 HCHG RX REV CODE 636 W/ 250 OVERRIDE (IP): Mod: UD | Performed by: PEDIATRICS

## 2024-01-02 PROCEDURE — 80053 COMPREHEN METABOLIC PANEL: CPT

## 2024-01-02 PROCEDURE — 82248 BILIRUBIN DIRECT: CPT

## 2024-01-02 PROCEDURE — 96409 CHEMO IV PUSH SNGL DRUG: CPT

## 2024-01-02 PROCEDURE — A4212 NON CORING NEEDLE OR STYLET: HCPCS

## 2024-01-02 PROCEDURE — 700111 HCHG RX REV CODE 636 W/ 250 OVERRIDE (IP): Mod: JZ,UD | Performed by: PEDIATRICS

## 2024-01-02 PROCEDURE — RXMED WILLOW AMBULATORY MEDICATION CHARGE: Performed by: PEDIATRICS

## 2024-01-02 PROCEDURE — 36591 DRAW BLOOD OFF VENOUS DEVICE: CPT

## 2024-01-02 RX ORDER — MERCAPTOPURINE 50 MG/1
TABLET ORAL
Qty: 64 TABLET | Refills: 5 | Status: SHIPPED | OUTPATIENT
Start: 2024-01-02

## 2024-01-02 RX ORDER — 0.9 % SODIUM CHLORIDE 0.9 %
20 VIAL (ML) INJECTION PRN
Status: CANCELLED | OUTPATIENT
Start: 2024-01-02

## 2024-01-02 RX ORDER — METHOTREXATE 2.5 MG/1
26.25 TABLET ORAL
Qty: 42 TABLET | Refills: 0 | Status: SHIPPED | OUTPATIENT
Start: 2024-01-02 | End: 2024-01-26 | Stop reason: SDUPTHER

## 2024-01-02 RX ORDER — ONDANSETRON 2 MG/ML
8 INJECTION INTRAMUSCULAR; INTRAVENOUS ONCE
Status: COMPLETED | OUTPATIENT
Start: 2024-01-02 | End: 2024-01-02

## 2024-01-02 RX ORDER — HEPARIN SODIUM,PORCINE 10 UNIT/ML
30 VIAL (ML) INTRAVENOUS PRN
Status: CANCELLED
Start: 2024-01-02

## 2024-01-02 RX ADMIN — VINCRISTINE SULFATE 2 MG: 1 INJECTION, SOLUTION INTRAVENOUS at 13:37

## 2024-01-02 RX ADMIN — ONDANSETRON 8 MG: 2 INJECTION INTRAMUSCULAR; INTRAVENOUS at 12:55

## 2024-01-02 RX ADMIN — HEPARIN 500 UNITS: 100 SYRINGE at 13:45

## 2024-01-02 ASSESSMENT — FIBROSIS 4 INDEX: FIB4 SCORE: 0.53

## 2024-01-02 NOTE — PROGRESS NOTES
"Pharmacy Chemotherapy Verification    Patient Name: Tomas Jean-Baptiste  Dx: pH+ B-Cell ALL         Protocol: Maintenance C3 and subsequent cycles(On Study Arm B, ID number: [id number])         Allergies: Amoxicillin       BP 99/64   Pulse 90   Temp 36.8 °C (98.2 °F) (Temporal)   Resp 18   Ht 1.65 m (5' 4.96\")   Wt 63.7 kg (140 lb 6.9 oz)   SpO2 100%   BMI 23.40 kg/m²    Body surface area is 1.71 meters squared.    All lab results 1/2/24 within treatment parameters.     Drug Order   (Drug name, dose, route, IV Fluid & volume, frequency, number of doses) Maintenance, Cycle 3, Day 57  Previous treatment: Maintenance, C3D29 12/4/23   Medication = Vincristine (ONCOVIN)   Base Dose= 1.5 mg/m2  Calc Dose: Base Dose x 1.76m2 = >2mg  Final Dose = 2 mg (MAX)   Route = IV  Fluid & Volume = NS 25 mL  Admin Duration = Over 5 - 10 minutes    Days 1, 29, 57      <10% difference, okay to treat with final max dose   Medication = Methotrexate PF  Base Dose = 12 mg fixed dose  Calc Dose: n/a  Final Dose = --- mg   Route = INTRATHECAL  Fluid & Volume = pf NS 6 mL  Admin Duration = IT per MD Day 1   No calculation required.   okay to treat with final dose   By my signature below, I confirm this process was performed independently with the BSA and all final chemotherapy dosing calculations congruent. I have reviewed the above chemotherapy order and that my calculation of the final dose and BSA (when applicable) corroborate those calculations of the  pharmacist. Discrepancies of 10% or greater in the written dose have been addressed and documented within the [facility] Progress notes.    Galen Wilson, PharmD  "

## 2024-01-02 NOTE — PROGRESS NOTES
"Pharmacy Chemotherapy Calculations Note:    Dx: B-ALL (CNS3)  Cycle: 3 Maintenance Day 57   Previous treatment: Maint C3D29 on 12/4/23     Protocol: Maintenance per Arm B of [id number] [facility] ID number: [id number]      /63   Pulse 94   Temp 37.3 °C (99.2 °F) (Temporal)   Resp 18   Ht 1.65 m (5' 4.96\")   Wt 63.7 kg (140 lb 6.9 oz)   SpO2 96%   BMI 23.40 kg/m²  Body surface area is 1.71 meters squared.    Labs from 1/2/24 reviewed - all within treatment parameters.       Vincristine 1.5 mg/m² (max 2 mg) x 1.71 m² = 2.565 mg   Max 2 mg, ok for final dose = 2 mg IV      Judy Bryant, PharmD, BCOP                "

## 2024-01-02 NOTE — PROGRESS NOTES
Pt to [facility] for lab draw, doctors office visit, and chemotherapy administration.      Afebrile.  VSS.  Awake and alert in no acute distress.      Port accessed using a 22g 3/4 inch trevino needle with 1 attempt.  Labs drawn from the port without difficulty.   Pt tolerated well.      Chemotherapy dosage calculated independently by Tammie Covington RN and Yue Poole RN and compared to road map for protocol Maintenance per Arm B of [id number] .  Calculations within 10% of written order.  Lab results reviewed.      Premedications and chemo given as ordered, see MAR.  Blood return verified prior to, during, and after chemotherapy infusion.  See Chemotherapy flowsheet.  PT tolerated well.  No side effects or complications noted.  Port flushed per orders (see MAR) and de-accessed after completion.  Will return for next visit on 1/29/24.

## 2024-01-02 NOTE — TELEPHONE ENCOUNTER
Contact:  Phone number:[phone] (mobile)    Name of person spoken with and relationship to patient: Tomas (self)   Patient’s Adherence:  How patient is doing on medication: Well    How many missed doses and reason: 0 N/A    Any new medications: No    Any new conditions: No    Any new allergies: No    Any new side effects: No    Any new diagnoses: No    How many doses remaining: at least a week    Did patient want to speak with pharmacist: No   Delivery:  Delivery date and method: 1/4/24 via     Needs by Date: 1/4/24    Signature required: No     Any additional details for : N/A   Teach Appointment Date:  N/A   Shipping Address:  [address] [place] [address]   Medication(name,strength and dose):  mercaptopurine (PURINETHOL) 50 MG Tab, methotrexate 2.5 MG tablet, and predniSONE (DELTASONE) 10 MG Tab   Copay:  $0   Payment Method:  n/a $0 copay   Supplies:  NA   Additional Information:  MARIAELENA Benito [facility]   Pharmacy Liaison  [phone]  1/2/2024 2:05 PM

## 2024-01-04 ENCOUNTER — PHARMACY VISIT (OUTPATIENT)
Dept: PHARMACY | Facility: MEDICAL CENTER | Age: 24
End: 2024-01-04
Payer: COMMERCIAL

## 2024-01-04 NOTE — PROGRESS NOTES
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 obtained at the time demonstrated a WBC of 0.4, Hgb 8.8 and platelets 125.  ANC at the time was measured at only 170.  JULY was otherwise clinically well appearing.  He did receive a one-time normal saline bolus for his nausea and vomiting and was sent home with instructions to HOLD his oral mercaptopurine and oral methotrexate which began being held on 7/28/2023.  Per protocol, imatinib was continued at previous dose of 600 mg in the morning. Tomas Roy would return to clinic again on 8/2/2023 and 8/7/2023.  On both occasions, ANC remained under 500 and oral therapy (with the exception of imatinib) continue to be held while awaiting count recovery.  Ultimately, on 8/11/2023 after 14 days of therapy being held, Tomas Roy returned to clinic and WBC had increased to 2.1 with ANC increasing to 1500 with an absolute monocyte  count of 290. Tomas Roy was then instructed to resume his oral chemotherapy at 100% of prior dosing with (due to timing with Day 1 of Cycle 2 with LP 3 days later, no weekly MTX was administered).  Imatinib was never held.  On 8/14/2023 he was seen in clinic for Day 1 of Cycle 2 of Maintenance with lumbar puncture, vincristine, and 5-day pulse of steroids.  At the time, his ANC was 2010 and his oral chemotherapy was continued at 100% dosing.  Given his history of previously drop in counts, he was scheduled to come back for repeat labs on 8/29/2023 at which point his WBC count was 1.4 and his ANC remained greater than 500 at 1140.  Again, his therapy was continued with imatinib 550 mg by mouth in the morning as well as 100% dosing of methotrexate and 100% dosing of 6-mercaptopurine.  When Tomas Roy returned to clinic on 9/11/2023 for his Maintenance, Cycle 2, Day 29 therapy he was noted to have had another drop in his WBC to 600 and his ANC accordingly was also decreased at 410.  He did receive vincristine in accordance with protocol as well as starting a 5-day pulse of prednisone per protocol.  Given however that his ANC had dropped below 500 his oral methotrexate and oral 6-MP were again held.  He was instructed return to clinic 1 week later for lab evaluations.  On 9/19/2023 he returned to clinic and WBC had improved slightly to 0.8 however ANC remained low at 260 with an absolute monocyte count of 220.  Given that counts not recovered his oral chemotherapy remained held for an additional week.  On 9/26/2023 at 14 days after initially holding chemotherapy, he was seen back in clinic at which time WBC improved again to 1.1 however ANC did not quite recover and remained at 490 with an absolute monocyte count of 330.  Given that 2 weeks had elapsed with myelosuppression, imatinib was held in addition to oral 6-MP and methotrexate. Tomas Roy was instructed to return to clinic on 9/29/2023 for  repeat counts.  On 9/29/2023 WBC had improved to 2.4 and ANC had finally recovered with 1530 and an absolute monocyte count of 510.  Given a recovery with ANC greater than 750 and platelets greater than 75, oral chemotherapy was restarted at 50% of initial dosing and imatinib was restarted at 100% of initial dosing.  On 10/9/2023, Tomas Roy returned to clinic for his Day 57 of Cycle 2 visit.  At the time of his visit, his ANC had recovered after holding chemotherapy and then restarting at 50% dosing 11 days prior.  He did however in clinic spiked a temperature 102.5 °F.  For this reason despite his ANC being improved, no dose adjustments were made in his chemotherapy.  He continued with 50% dosing with 100% adherence of his 6-MP and methotrexate as well as his imatinib at 550 mg PO QHS.   Tomas Roy was then seen in clinic again on 11/6/2023 for his Day 1 of Cycle 3 of Maintenance therapy.  At the time, his imatinib dose was adjusted back up to 600 mg daily taken in the morning.  Additionally, his methotrexate was adjusted back up to 75% of expected dosing and his mercaptopurine was increased to 75% of expected dosing as well.  He will return to clinic on 12/4/2023 for his Day 29 of Cycle 3 therapy.  At the time, his ANC was exactly 1500 and therefore no dose adjustments were made and he continued at 75% dosing for oral chemotherapy as well as the imatinib dose of 600 mg daily.  Today presents back for Day 57 evaluations and chemotherapy management.  Interval history as above.     Tomas Roy presents in good clinical health.  He has not had any recent or remote illness.  No fevers.  Not complaining of any decrease in energy or activity.  Continues to attend school full-time.  No complaints of any headaches, change in vision or neurologic status changes.  No complaints of any cough, congestion or rhinorrhea.  No complaints of any nausea, vomiting, diarrhea or constipation.  Peripheral neuropathies  are not an issue but present.  No complaints of any aches or pains.  No easy bruising or bleeding.  100% adherent with oral medications at 75% dosing for both methotrexate and mercaptopurine.  He reports that he has been taking methotrexate 10.5 tabs each week as well as mercaptopurine 2 tablets by mouth every day.  He has been 100% adherent with his oral imatinib as well at the 600 mg each day.    Review of Systems:     Constitutional: Afebrile.  Without recent illness.  Energy and activity are good.   HENT: Negative.  Eyes: Negative for visual changes.  Respiratory: Negative for shortness of breath.  Cardiovascular: Negative.  Gastrointestinal: Negative for nausea, vomiting, abdominal pain, diarrhea, constipation.  Genitourinary: Negative.  Musculoskeletal: Negative.  Skin: Negative for rash, signs of infection.  Neurological: Negative for numbness, tingling, sensory changes, weakness.  Endo/Heme/Allergies: Does not bruise/bleed easily.    Psychiatric/Behavioral: No changes in mood, appropriate for age.     PAST MEDICAL HISTORY:     Oncologic History:  2-3 week history of worsening fatigue, right lower extremity pain  Presentation to OS and diagnosed with right LE superficial thrombus, subsegmental PE and hyperleukocytosis, anemia and thrombocytopenia  Transferred to [facility] for definitive care  Presenting (local) WBC > 440K, Hgb 10.0, platelets 53, (automated differential ANC 3190, ALC 75,310, absolute monocyte count 69582, absolute blast count 340,560)  Uric Acid 15.6, phosphorus 5.6, LDH 1114  Rasburicase x 1 dose given   Peripheral Blood flow cytometry demonstrating CD10 pos, CD19 pos, CD20 neg, CD22 dim (60%) 5/28/2022  Peripheral blood FISH for BCR-ABL1 positive in 98% of analyzed cells     Age at Diagnosis: 21 years  White Blood Cell Count at Presentation: > 440 k/uL  Testicular Disease Status: Negative (see procedure note 5/30/2022)  CNS Status: 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, diagnostic LP  with WBC 1, RBC 3 and no evidence of leukemic blasts 5/30/2022  Steroid Pre-treatment: None  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     All inclusion/exclusion criteria for [id number] met and consent signed - enrolled 5/29/2022   All inclusion/exclusion criteria for [id number] met and consent signed - enrolled 5/30/2022  Confirmatory bone marrow aspirate and biopsy and diagnostic LP + cytarabine 70 mg IT 5/30/2022  Induction therapy (ON STUDY [id number]) started 5/30/2022  Bone marrow immunohistochemistry consistent with diagnosis of B-ALL comprising 90% of marrow cellularity  Bone marrow sample sent to [facility] for [facility] purposes:  Flow cytom  Of the blood pressure little high that is a problem is a cultural problem is well and cultural genetic and everything else like that unfortunately breathalyzers such bad heart disease diabetes things like that  populations etry consistent with peripheral blood, cytogenetics remarkable for known t(9;22)  CSF with WBC 1, RBC 3, no blasts identified on cytospin  FISH results available 5/31/2022 making patient eligible for transfer from [place] [address] to [place] [address] as eligibility requirements were met for [place] [address]  Patient unenrolled from [place] [address] (BCR-ABL1 fusion positive) 6/1/2022  Consent signed for [place] [address] and patient enrolled 6/1/2022 (see eligibility checklist from 5/31/2022 and consent note from 6/1/2022)  Imatinib 400 mg PO QAM / 200 mg PO QPM started 6/3/2022 (allowed per [place] [address])  Patient completed the first 15 days of a Standard 4-drug Induction on 6/13/2022  Start of [facility] [id number](OS), Induction IA Part 2, Day 15 6/13/2022  End of Induction 1A Part 2 - MRD negative  Start of [id number](OS), Induction IA Part 2, Day 15 7/5/2022  Induction IB DELAYED 2 weeks 14 days from 7/26/2022-8/9/2022) for myelosuppression - Start of Day 22 cytarabine block 8/9/2022  Induction IB Day 42 delayed 9 days for  "myelosuppression - Day 42 evaluations 9/7/2022  End of Induction IB - Flow cytometric MRD negative, MRD by IgH-TCR PCR [id number]%  Randomization to AR-Experimental Arm B of [id number]  Start of AR-Experimental Arm B, Interim Maintenance 9/29/2022  Weight based increase in dose of imatinib to 400 mg PO AM and 250 mg PM 9/29/2022  Thrombocytopenia not permissive of proceeding with Day 15 of Interim Maintenance  AR-Experimental Arm B, Interim Maintenance, Day 15 delayed 4 days, start 10/17/2022  AR-Experimental Arm B, Interim Maintenance, Day 29, start 11/1/2022  AR-Experimental Arm B, Interim Maintenance, Day 43, start 11/14/2022  Last does of 6-MP 11/27/2022  AR-Experimental Arm B, Delayed Intensification, Day 1, start 12/6/2022  Admission with Severe Septic Shock 12/27/2022  Imatinib HELD 12/27/2022-1/8/2023  AR-Experimental Arm B, Delayed Intensification, Day 29 DELAYED 14 days with start 1/17/2023  Hospitalization 2/7/2023 on Day 50 of Delayed Intensification with left shoulder pain ultimately diagnosed with Bacteroides fragilis infection  AR-Experimental Arm B, Interim Maintenance, Day 1 DELAYED 7 days with start 2/27/2023  AR-Experimental Arm B, Interim Maintenance, Day 11 DELAYED 4 days for platelets 43K on 3/9/2023  AR-Experimental Arm B, Interim Maintenance, Day 11 VCR given and MTX omitted for platelets 43K 3/13/2023  Imatinib HELD 3/30/2023-4/11/2023  AR-Experimental Arm B, Interim Maintenance, Day 21 (DELAYED 22 DAYS) - administered 4/11/2023 with methotrexate 80 mg/m² IV  AR-Experimental Arm B, Interim Maintenance, Day 31 (\"true\" Day 31 4/25/2023) - VCR and IT MTX given 4/28/2023 - IV MTX omitted  AR-Experimental Arm B, Interim Maintenance, Day 41 met with counts - administered 5/5/2023 with methotrexate 80 mg/m² IV     Start of Maintenance, Cycle 1, Day 1 -5/22/2023  Cranial radiation 6/5/2023-6/16/2023 (10 fractions)  Maintenance, Cycle 1, Day 29 - 6/23/2023 (no IT MTX given)  Maintenance, Cycle 1, Day " 67, presented with fatigue nausea and vomiting found to have ANC less than 500  Methotrexate (oral) and mercaptopurine held 7/27/2023 - continued with imatinib  Methotrexate (oral) and mercaptopurine held 8/2/2023 - continued with imatinib  Methotrexate (oral) and mercaptopurine held 8/7/2023 - continued with imatinib  Restarted methotrexate (oral) and mercaptopurine at 100% previous dosing on 8/11/2023 - continued with imatinib  Maintenance, Cycle 2, Day 1 - 8/14/2023  Maintenance, Cycle 2, Day 29 - 9/11/2023  Methotrexate (oral) and mercaptopurine held 9/11/2023 - continued with imatinib  Methotrexate (oral) and mercaptopurine held 9/19/2023 - continued with imatinib  Methotrexate (oral) and mercaptopurine held 9/26/2023 - HELD imatinib  Methotrexate (oral) restarted at 50% dosing and mercaptopurine restarted at 50% dosing 9/29/2023 - RESTARTED imatinib  Maintenance, Cycle 2, Day 57 - 10/9/2023  Maintenance, Cycle 3, Day 1 - 11/6/2023: Methotrexate (oral) increased to 75% dosing and mercaptopurine increased to 75% dosing.  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  3) Hyperleukocytosis  4) Hyperuricemia  5) Hyperphosphatemia  6) Right Lower Extremity Superficial Thrombus  7) Subsegmental Pulmonary Embolism  8) 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  9) Bacteroides fragilis soft tissue infection left shoulder  10) Anxiety/Depression     Past Surgical History:     1) Temporary Right IJ Pharesis Catheter Placement 5/28/2022  2) Right-sided Port-A-Cath placement 8/29/2022  3) IR drainage left calf hematoma  4) Left shoulder I&D  5) Cranial XRT 6/5/2023-6/16/2023     Birth/Developmental History:   1st of three children  Unremarkable pregnancy  Unremarkable delivery     Allergies:             Allergies as of 05/27/2022 - Reviewed  "05/27/2022   Allergen Reaction Noted    Amoxicillin   04/03/2020      Social History:   Lives at home with mother, brother and sister.  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   Two dogs. Father not involved.     Family History:     Family History             Family History   Problem Relation Age of Onset    No Known Problems Mother      Diabetes Paternal Grandfather      Hypertension Paternal Grandfather      Hyperlipidemia Paternal Grandfather      Cancer Neg Hx      Heart Disease Neg Hx      Stroke Neg Hx           No significant family history of cancer.  Both maternal and paternal family history of diabetes.     Immunizations:  UTD    Medications:   Current Outpatient Medications on File Prior to Encounter   Medication Sig Dispense Refill    predniSONE (DELTASONE) 10 MG Tab Take 3.5 tablets by mouth in the morning and evening for 5 Days at Day 1, 29 and 57 of each cycle. 35 Tablet 6    imatinib (GLEEVEC) 400 MG tablet Take 1.5 Tablets by mouth every morning for 30 days. Pt takes 200 mg + 400 mg for a total dose of 600 mg daily 45 Tablet 3    omeprazole (PRILOSEC) 20 MG delayed-release capsule Take 1 Capsule by mouth every day for 180 days. 30 Capsule 5    LORazepam (ATIVAN) 1 MG Tab Take 1 mg by mouth every four hours as needed for Anxiety.      sulfamethoxazole-trimethoprim (BACTRIM DS) 800-160 MG tablet Take 2 Tablets by mouth twice daily two days a week. (Patient taking differently: Take 1 Tablet by mouth 2 times a day.) 48 Tablet 11    Melatonin 5 MG Cap Take 5 mg by mouth at bedtime as needed.      ondansetron (ZOFRAN ODT) 8 MG TABLET DISPERSIBLE Dissolve 1 Tablet by mouth every 6 hours as needed for Nausea/Vomiting. 10 Tablet 0     No current facility-administered medications on file prior to encounter.     OBJECTIVE:     Vitals:   BP 99/64   Pulse 90   Temp 36.8 °C (98.2 °F) (Temporal)   Resp 18   Ht 1.65 m (5' 4.96\")   Wt 63.7 kg (140 lb 6.9 oz)   SpO2 100%     Labs:    Hospital Outpatient Visit on 01/02/2024 "   Component Date Value    WBC 01/02/2024 2.2 (L)     RBC 01/02/2024 3.47 (L)     Hemoglobin 01/02/2024 11.4 (L)     Hematocrit 01/02/2024 35.4 (L)     MCV 01/02/2024 102.0 (H)     MCH 01/02/2024 32.9     MCHC 01/02/2024 32.2 (L)     RDW 01/02/2024 58.4 (H)     Platelet Count 01/02/2024 265     MPV 01/02/2024 10.0     Neutrophils-Polys 01/02/2024 65.20     Lymphocytes 01/02/2024 14.90 (L)     Monocytes 01/02/2024 17.60 (H)     Eosinophils 01/02/2024 0.90     Basophils 01/02/2024 0.90     Immature Granulocytes 01/02/2024 0.50     Nucleated RBC 01/02/2024 0.00     Neutrophils (Absolute) 01/02/2024 1.45 (L)     Lymphs (Absolute) 01/02/2024 0.33 (L)     Monos (Absolute) 01/02/2024 0.39     Eos (Absolute) 01/02/2024 0.02     Baso (Absolute) 01/02/2024 0.02     Immature Granulocytes (a* 01/02/2024 0.01     NRBC (Absolute) 01/02/2024 0.00     Sodium 01/02/2024 143     Potassium 01/02/2024 3.7     Chloride 01/02/2024 108     Co2 01/02/2024 24     Anion Gap 01/02/2024 11.0     Glucose 01/02/2024 89     Bun 01/02/2024 7 (L)     Creatinine 01/02/2024 0.58     Calcium 01/02/2024 8.4 (L)     Correct Calcium 01/02/2024 8.4 (L)     AST(SGOT) 01/02/2024 20     ALT(SGPT) 01/02/2024 26     Alkaline Phosphatase 01/02/2024 70     Total Bilirubin 01/02/2024 0.5     Albumin 01/02/2024 4.0     Total Protein 01/02/2024 6.1     Globulin 01/02/2024 2.1     A-G Ratio 01/02/2024 1.9     Direct Bilirubin 01/02/2024 <0.2     Indirect Bilirubin 01/02/2024 see below     GFR (CKD-EPI) 01/02/2024 141        Physical Exam:    Constitutional: Well-developed, well-nourished, and in no distress.  Well appearing.  HENT: Normocephalic and atraumatic. No nasal congestion or rhinorrhea. Oropharynx is clear and moist. No oral ulcerations or sores.    Eyes: Conjunctivae are normal. Pupils are equal, round.  EOMI.  Nonicteric.  Neck: Normal range of motion of neck, no adenopathy.    Cardiovascular: Normal rate, regular rhythm and normal heart sounds.  No murmur  heard. DP/radial pulses 2+, cap refill < 2 sec.  Pulmonary/Chest: Effort normal and breath sounds normal. No respiratory distress. Symmetric expansion.  No crackles or wheezes.  Abdomen: Soft. Bowel sounds are normal. No distension and no mass. There is no hepatosplenomegaly.    Genitourinary:  Deferred  Musculoskeletal: Normal range of motion of lower and upper extremities bilaterally.   Neurological: Alert and oriented to person and place. Exhibits normal muscle tone bilaterally in upper and lower extremities. Gait normal. Coordination normal.    Skin: Skin is warm, dry and pink.  No rash or evidence of skin infection.  No pallor.   Psychiatric: Mood and affect normal for age.    ASSESSMENT AND PLAN:     Tomas Jean-Baptiste is a previously healthy 23 year old male with  Precursor B-Cell Lymphoblastic Leukemia with BCR-ABL1 fusion and whose End of Induction IB MRD was both negative by flow cytometry and PCR who presents for Maintenance, Cycle 3, Day 57     1) Ph+ Precursor B-Cell Acute Lymphoblastic Leukemia, in MRD Remission:  - 2-3 weeks of symptoms  - Presenting WBC > 440 k/uL, hyperleukocytosis  - Start of Hydroxyurea (1500 mg PO Q8) 2320 on 5/27/2022  - discontinued after only 55 hours  - No steroid pretreatment  - 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  - Testicular status NEGATIVE       - Flow cytometry of both 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, FISH positive for BCR-ABL1 translocation  - Enrolled on [facility] [id number]  - Initially enrolled on [facility] [id number] - but 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                            - Enrolled on [facility] [id number] and began study 6/13/2022              - Started imatinib therapy 6/3/2022   - End of Induction IA and IB MRD negative  - Imatinib dose increased to 400 mg PO AM and 250 mg PM 9/29/2022   -* Note:  All imatinib doses given inpatient rounded down to 600 mg   - Imatinib held for Septic Shock  12/27/2022-1/8/2023   - Imatinib 600 mg PO daily restarted 1/8/2023 inpatient   - Imatinib 400 mg PO QAM and 250 mg PO QHS 1/14/2023-1/17/2023   - Imatinib 600 mg PO QAM 1/17/2023 - 3/30/2023   - Imatinib 600 mg PO QAM 4/11/2023 - 8/13/2023   - Imatinib 550 mg PO QAM 8/14/2023 - 9/26/2023   - Imatinib 550 mg PO QAM 9/29/2023 -11/6/2023   - Imatinib 600 mg PO QAM 11/6/2023 - PRESENT      - Imatinib dosing changed every 12 weeks. Continue Imatinib at 600 mg PO QAM.               - WBC 2.2, Hgb 11.4, platelets 265            - ANC 1450,             - AST 20, ALT 26, total bilirubin 0.5                - ANC today greater than 1500 (1st time) - will not make any dose adjustments today however if future labs demonstrate inadequate myelosuppression, will increase methotrexate     [id number](OS), AR Arm B, Maintenance, Cycle 3, Day 57:   ** Continue Imatinib at 600 mg PO QAM          ** Continue oral MTX to 10.5 tablets PO weekly   ** Continue oral 6-mercaptopurine 2 tablets PO daily x 7 days      - Return to infusion center in 1 month for Cycle 4, Day 1     2) Chemotherapy Related Pancytopenia:           - WBC 2.2, Hgb 10.9, platelets 223            - ANC 1680,   - Transfuse for hemoglobin less than 7 gm/dL or symptomatic  - Transfuse for platelets less than 10,000/microliters or symptomatic  - No transfusions necessary today     3) At Risk of Opportunistic Lung Infection:  - Bactrim DS PO BID Sat and Sun for PJP prophylaxis     4) Central Access:   - R Port-A-Cath in place      5) Research Participant: ON STUDY [id number], AR Arm B, Maintenance, Cycle 4, Day 1             [facility] - Source Data         Diagnosis: Ph+ Precursor B-Cell Acute Lymphoblastic Leukemia     Disease Status: EOI1A MRD NEGATIVE, EOI1B RD NEGATIVE, CNS3c, testicular negative, HSV1 IgG POSITIVE, CMV IgG NEGATIVE, VARICELLA IgG POSITIVE     Active Studies: [id number], [id number]                                                                                                       Inactive Studies: [id number]                                                                                                                                                Optional Studies: None             Protocol: International Phase 3 trial in [place] 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      Treatment Plan: [id number](OS), AR-Arm B, Maintenance, Cycle 3, Day 57 (1/2/2024)     Height: 1.65 m     Weight: 66.1 kg     BSA: 1.74 m²   (Maintenance, Cycle 3,   Day 1, 11/6/2023)                              Performance Status: Karnofsky 90, ECOG 1 (11/6/2023)      Treatment Plan Medications:  (100% adherent)     Continue oral 6- mercaptopurine 2 tabs x 7 days   Continue oral MTX to 10.5 tabs weekly   Continue Imatinib to 600 mg PO QHS      Evaluations / Study Labs:  (1/2/2024)     WBC 2.2, Hgb 11.4, platelets 265  ANC 1450,   AST 20, ALT 26, total bilirubin 0.5     Therapy Given: (1/2/2024)     Oral chemotherapy as above    Maintenance, Cycle 3 Toxicities:     None         Disposition: Return to infusion center in 1 month for Day 1 of Cycle 4 of Maintenance    Pepe Faye MD  Pediatric Hematology / Oncology  [facility]  Cell.  [phone]  [facility]. [phone]

## 2024-01-05 ENCOUNTER — TELEPHONE (OUTPATIENT)
Dept: PHARMACY | Facility: MEDICAL CENTER | Age: 24
End: 2024-01-05
Payer: MEDICAID

## 2024-01-05 PROCEDURE — RXMED WILLOW AMBULATORY MEDICATION CHARGE: Performed by: PEDIATRICS

## 2024-01-05 NOTE — TELEPHONE ENCOUNTER
Contact:  Phone number:[phone] (mobile)   Name of person spoken with and relationship to patient: Tomsa (self)  Patient’s Adherence:  How patient is doing on medication: Well   How many missed doses and reason: 0 N/A   Any new medications: No   Any new conditions: No   Any new allergies: No   Any new side effects: No   Any new diagnoses: No   How many doses remaining: at least a week   Did patient want to speak with pharmacist: No  Delivery:  Delivery date and method: 1/10/24 via    Needs by Date: 1/10/24   Signature required: No may leave at the door   Any additional details for : N/A  Teach Appointment Date:  N/A  Shipping Address:  [address] [place], [place] [id number]  Medication(name,strength and dose): imatinib (GLEEVEC) 400 MG tablet  Copay:  $0  Payment Method:  n/a $0 copay  Supplies:  NA  Additional Information:  No questions for an [facility]

## 2024-01-08 ENCOUNTER — PATIENT OUTREACH (OUTPATIENT)
Dept: HEALTH INFORMATION MANAGEMENT | Facility: OTHER | Age: 24
End: 2024-01-08
Payer: MEDICAID

## 2024-01-08 ENCOUNTER — PATIENT MESSAGE (OUTPATIENT)
Dept: HEALTH INFORMATION MANAGEMENT | Facility: OTHER | Age: 24
End: 2024-01-08

## 2024-01-10 ENCOUNTER — PHARMACY VISIT (OUTPATIENT)
Dept: PHARMACY | Facility: MEDICAL CENTER | Age: 24
End: 2024-01-10
Payer: COMMERCIAL

## 2024-01-12 NOTE — THERAPY
"Occupational Therapy   Initial Evaluation     Patient Name: Tomas Jean-Baptiste  Age:  22 y.o., Sex:  male  Medical Record #: [id number]  Today's Date: 1/3/2023     Precautions  Precautions: Fall Risk    Assessment  Patient is 22 y.o. male admitted with vomiting and dizziness and a syncopal event.  In the ED the patient was found to be profoundly pancytopenic with evidence of severe DIC and hematemesis. Patient was admitted to the ICU with hemorrhagic shock, GI bleed. He has a Hx of acute lymphoblastic leukemia receiving therapy. Additional factors influencing patient status / progress: weakness, fatigue, impaired balance, pain.      Plan    Occupational Therapy Initial Treatment Plan   Treatment Interventions: Self Care / Activities of Daily Living, Adaptive Equipment, Neuro Re-Education / Balance, Therapeutic Exercises, Therapeutic Activity  Treatment Frequency: 3 Times per Week  Duration: Until Therapy Goals Met    DC Equipment Recommendations: Tub Transfer Bench  Discharge Recommendations: Recommend post-acute placement for additional occupational therapy services prior to discharge home     Subjective    \"That's about the extent of what I can do.\"     Objective       01/03/23 0957   Prior Living Situation   Prior Services Home-Independent   Housing / Facility [facility]   Bathroom Set up Bathtub / Shower Combination;Shower Curtain   Equipment Owned None   Lives with - Patient's Self Care Capacity Parents   Comments Mom is home to assist pretty much all the time per patient.   Prior Level of ADL Function   Self Feeding Independent   Grooming / Hygiene Independent   Bathing Independent   Dressing Independent   Toileting Independent   Prior Level of IADL Function   Medication Management Independent   Laundry Independent   Kitchen Mobility Independent   Finances Independent   Home Management Independent   Shopping Independent   Prior Level Of Mobility Independent Without Device in Community;Independent " Detail Level: Detailed Without Device in Home   Occupation (Pre-Hospital Vocational) Student   Precautions   Precautions Fall Risk   Pain 0 - 10 Group   Therapist Pain Assessment Nurse Notified;During Activity  (c/o pain in BLE)   Cognition    Cognition / Consciousness WDL   Level of Consciousness Alert   Comments pleasant and cooperative, circular reasoning at times   Active ROM Upper Body   Active ROM Upper Body  WDL   Strength Upper Body   Upper Body Strength  WDL   Coordination Upper Body   Coordination WDL   Balance Assessment   Sitting Balance (Static) Fair   Sitting Balance (Dynamic) Fair -   Weight Shift Sitting Fair   Weight Shift Standing Fair   Comments pt refused standing today   Bed Mobility    Supine to Sit Minimal Assist   Sit to Supine Standby Assist   Scooting Standby Assist   ADL Assessment   Comments Refused all ADLs despite education; pt only wanted to focus on mobility today. Pt reports ADLs aren't necessarily a huge goal for him as he is OK with his mom helping him. He would like to focus OT goals more towards functional mobility for ADLs.   How much help from another person does the patient currently need...   Putting on and taking off regular lower body clothing? 2   Bathing (including washing, rinsing, and drying)? 2   Toileting, which includes using a toilet, bedpan, or urinal? 2   Putting on and taking off regular upper body clothing? 3   Taking care of personal grooming such as brushing teeth? 3   Eating meals? 3   6 Clicks Daily Activity Score 15   Functional Mobility   Sit to Stand Refused   Bed, Chair, Wheelchair Transfer Refused   Mobility EOB only   Patient / Family Goals   Patient / Family Goal #1 go home and get walking   Short Term Goals   Short Term Goal # 1 pt will demo toilet txf with supv   Short Term Goal # 2 pt will dress LB with supv   Short Term Goal # 3 pt will demo understanding of tub txf w/ DME with supv

## 2024-01-17 ENCOUNTER — OFFICE VISIT (OUTPATIENT)
Dept: PSYCHOLOGY | Facility: MEDICAL CENTER | Age: 24
End: 2024-01-17
Attending: PSYCHOLOGIST
Payer: COMMERCIAL

## 2024-01-17 DIAGNOSIS — C91.Z0 B LYMPHOBLASTIC LEUKEMIA WITH T(9;22)(Q34;Q11.2);BCR-ABL1 (HCC): ICD-10-CM

## 2024-01-17 PROCEDURE — 96159 HLTH BHV IVNTJ INDIV EA ADDL: CPT | Performed by: PSYCHOLOGIST

## 2024-01-17 PROCEDURE — 96158 HLTH BHV IVNTJ INDIV 1ST 30: CPT | Performed by: PSYCHOLOGIST

## 2024-01-17 PROCEDURE — 2023F DILAT RTA XM W/O RTNOPTHY: CPT | Performed by: PSYCHOLOGIST

## 2024-01-17 NOTE — PROGRESS NOTES
PEDIATRIC BEHAVIORAL HEALTH VISIT    Name:  Tomas Jean-Baptiste  MRN:  [id number]  :  2000  Age:  23 y.o.  Referring Provider: Dr. Faye (Hem/Onc)  Pediatrician:  Margarito Arvizu M.D.  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 See intake note dated 23    Mental Status Exam:   General description In no apparent distress, well-groomed, appropriately attired, well-nourished, and cooperative with interview  Interactional style Culturally appropriate  Eye contact Normal and appropriate for culture  Speech Unimpaired, fluid and clear, normal rate and rythem  Motor activity Normal motor activity  Orientation Oriented to person time, place and situation  Intellectual functioning Unimpaired  Memory Unimpaired  Attention and concentration Intact and normative concentration  Fund of knowledge Average  Mood Euthymic  Affect Appropriate  Perceptual Disturbances None apparent  Thought Process  No abnormalities apparent       Associations Unimpaired associations       Abstractions Normal abstractions, intact       Insight Insight - adequate and normative       Judgment Judgments - intact and normative   Thought Content  No apparent delusions    Risk Assessment:  Tomas Roy denied current concerns regarding risk to self or others.       Issues Discussed:   This provider met with JULY to continue assisting in rediscovering himself after the trauma of his cancer diagnosis and treatment as well as how childhood impacts this. Today the session was spent processing events over the last couple of months and what he noticed in his reactions. Processed how he  feels he needs to respect his mother and her decisions while also planning for his future and finding ways to express his thoughts/feelings to friends/family about this. Spent the remainder of the visit educating him on how core beliefs works and ways to start noticing what his might be.      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 After meeting with JULY during his intake, it appears he could benefit from learning anxiety management skills, processing what he has been through, and how to live the life he wants. Tomas Roy Nabeel Jean-Baptiste could benefit from learning appropriate ways of expressing and coping with his emotions. He could also benefit from learning Cognitive Behavioral Therapy (CBT) and Acceptance and Commitment Therapy (ACT) tools to understand the interaction of thoughts, feelings, and actions. As well as Trauma Focused-CBT to process his cancer journey. Tomas Judaism Nabeel Amarosa agreed with the plan.       PLAN  JULY will learn and utilize appropriate ways to express and cope with emotions.    He will learn the connection between thoughts, feelings, and actions utilizing CBT and ACT tools.,   Continued processing the impact of cancer on his life and ways to notice triggers.   Encouraged him to begin noticing what his core belief might be.  JULY will process how their medical diagnosis is impacting their life.  Talked about how he is reclaiming his life.     Next visit we will review what he noticed in his core belief and discuss what he learned as a child.    The above diagnostic impressions, recommendations, and  treatment plan were discussed with and agreed upon by Tomas Roy, and his caregivers. Care will be coordinated with Tomas Roy's healthcare team, as appropriate.    Total time spent on encounter was 60 minutes.    Laurie Ortega, PhD  Pediatric Psychologist   Licensed Psychologist, [place] # [id number]  [facility], Behavioral Health

## 2024-01-23 PROCEDURE — RXMED WILLOW AMBULATORY MEDICATION CHARGE: Performed by: PEDIATRICS

## 2024-01-25 NOTE — ADDENDUM NOTE
Encounter addended by: Alyce Duenas M.D. on: 1/24/2024 8:40 PM   Actions taken: Clinical Note Signed

## 2024-01-26 DIAGNOSIS — Z51.11 ENCOUNTER FOR CHEMOTHERAPY MANAGEMENT: ICD-10-CM

## 2024-01-26 NOTE — PROGRESS NOTES
"Pharmacy Chemotherapy Calculations Note:    Dx: B-ALL (CNS3)  Cycle: 4 Maintenance Day 1   Previous treatment: Maint C3D57 on 1/2/24     Protocol: Maintenance per Arm B of [id number] [facility] ID number: [id number]      /85   Pulse (!) 117   Temp 36.6 °C (97.8 °F) (Temporal)   Resp 20   Ht 1.655 m (5' 5.16\")   Wt 65.1 kg (143 lb 8.3 oz)   SpO2 98%   BMI 23.77 kg/m²  Body surface area is 1.73 meters squared.    Labs from 1/29/24 reviewed - all within treatment parameters.       IT methotrexate 12 mg fixed dose for age >3 yrs   No Calc Req'd, ok for final dose = 12 mg IT injection by MD    Vincristine 1.5 mg/m² (max 2 mg) x 1.73 m² = 2.595 mg   Max 2 mg, ok for final dose = 2 mg IV      Judy Bryant, PharmD, BCOP                "

## 2024-01-29 ENCOUNTER — HOSPITAL ENCOUNTER (OUTPATIENT)
Dept: INFUSION CENTER | Facility: MEDICAL CENTER | Age: 24
End: 2024-01-29
Attending: PEDIATRICS
Payer: COMMERCIAL

## 2024-01-29 ENCOUNTER — PATIENT OUTREACH (OUTPATIENT)
Dept: HEALTH INFORMATION MANAGEMENT | Facility: OTHER | Age: 24
End: 2024-01-29

## 2024-01-29 VITALS
DIASTOLIC BLOOD PRESSURE: 66 MMHG | BODY MASS INDEX: 23.91 KG/M2 | OXYGEN SATURATION: 99 % | HEIGHT: 65 IN | TEMPERATURE: 97.2 F | WEIGHT: 143.52 LBS | RESPIRATION RATE: 20 BRPM | SYSTOLIC BLOOD PRESSURE: 117 MMHG | HEART RATE: 68 BPM

## 2024-01-29 DIAGNOSIS — Z51.11 ENCOUNTER FOR CHEMOTHERAPY MANAGEMENT: ICD-10-CM

## 2024-01-29 DIAGNOSIS — C91.01 ACUTE LYMPHOBLASTIC LEUKEMIA (ALL) IN REMISSION (HCC): ICD-10-CM

## 2024-01-29 DIAGNOSIS — C91.Z0 B LYMPHOBLASTIC LEUKEMIA WITH T(9;22)(Q34;Q11.2);BCR-ABL1 (HCC): ICD-10-CM

## 2024-01-29 LAB
ALBUMIN SERPL BCP-MCNC: 4.3 G/DL (ref 3.2–4.9)
ALBUMIN/GLOB SERPL: 2 G/DL
ALP SERPL-CCNC: 87 U/L (ref 30–99)
ALT SERPL-CCNC: 32 U/L (ref 2–50)
ANION GAP SERPL CALC-SCNC: 10 MMOL/L (ref 7–16)
AST SERPL-CCNC: 24 U/L (ref 12–45)
BASOPHILS # BLD AUTO: 0.5 % (ref 0–1.8)
BASOPHILS # BLD: 0.01 K/UL (ref 0–0.12)
BILIRUB CONJ SERPL-MCNC: <0.2 MG/DL (ref 0.1–0.5)
BILIRUB INDIRECT SERPL-MCNC: NORMAL MG/DL (ref 0–1)
BILIRUB SERPL-MCNC: 0.7 MG/DL (ref 0.1–1.5)
BUN SERPL-MCNC: 8 MG/DL (ref 8–22)
BURR CELLS/RBC NFR CSF MANUAL: 0 %
CALCIUM ALBUM COR SERPL-MCNC: 8.5 MG/DL (ref 8.5–10.5)
CALCIUM SERPL-MCNC: 8.7 MG/DL (ref 8.5–10.5)
CHLORIDE SERPL-SCNC: 107 MMOL/L (ref 96–112)
CLARITY CSF: CLEAR
CO2 SERPL-SCNC: 22 MMOL/L (ref 20–33)
COLOR CSF: COLORLESS
COLOR SPUN CSF: COLORLESS
CREAT SERPL-MCNC: 0.61 MG/DL (ref 0.5–1.4)
CYTOLOGY REG CYTOL: NORMAL
EOSINOPHIL # BLD AUTO: 0.05 K/UL (ref 0–0.51)
EOSINOPHIL NFR BLD: 2.3 % (ref 0–6.9)
ERYTHROCYTE [DISTWIDTH] IN BLOOD BY AUTOMATED COUNT: 64.3 FL (ref 35.9–50)
GFR SERPLBLD CREATININE-BSD FMLA CKD-EPI: 139 ML/MIN/1.73 M 2
GLOBULIN SER CALC-MCNC: 2.1 G/DL (ref 1.9–3.5)
GLUCOSE SERPL-MCNC: 84 MG/DL (ref 65–99)
HCT VFR BLD AUTO: 32 % (ref 42–52)
HGB BLD-MCNC: 10.9 G/DL (ref 14–18)
IMM GRANULOCYTES # BLD AUTO: 0.01 K/UL (ref 0–0.11)
IMM GRANULOCYTES NFR BLD AUTO: 0.5 % (ref 0–0.9)
LYMPHOCYTES # BLD AUTO: 0.15 K/UL (ref 1–4.8)
LYMPHOCYTES NFR BLD: 6.8 % (ref 22–41)
LYMPHOCYTES NFR CSF: 77 %
MCH RBC QN AUTO: 34.6 PG (ref 27–33)
MCHC RBC AUTO-ENTMCNC: 34.1 G/DL (ref 32.3–36.5)
MCV RBC AUTO: 101.6 FL (ref 81.4–97.8)
MONOCYTES # BLD AUTO: 0.31 K/UL (ref 0–0.85)
MONOCYTES NFR BLD AUTO: 14 % (ref 0–13.4)
MONOS+MACROS NFR CSF MANUAL: 22 %
NEUTROPHILS # BLD AUTO: 1.68 K/UL (ref 1.82–7.42)
NEUTROPHILS NFR BLD: 75.9 % (ref 44–72)
NEUTROPHILS NFR CSF: 1 %
NRBC # BLD AUTO: 0 K/UL
NRBC BLD-RTO: 0 /100 WBC (ref 0–0.2)
NUC CELL # CSF: 1 CELLS/UL (ref 0–10)
PLATELET # BLD AUTO: 223 K/UL (ref 164–446)
PMV BLD AUTO: 8.8 FL (ref 9–12.9)
POTASSIUM SERPL-SCNC: 3.6 MMOL/L (ref 3.6–5.5)
PROT SERPL-MCNC: 6.4 G/DL (ref 6–8.2)
RBC # BLD AUTO: 3.15 M/UL (ref 4.7–6.1)
RBC # CSF: 1 CELLS/UL
SODIUM SERPL-SCNC: 139 MMOL/L (ref 135–145)
SPECIMEN VOL CSF: 2 ML
TUBE # CSF: 2
TUBE # CSF: NORMAL
WBC # BLD AUTO: 2.2 K/UL (ref 4.8–10.8)

## 2024-01-29 PROCEDURE — 96375 TX/PRO/DX INJ NEW DRUG ADDON: CPT

## 2024-01-29 PROCEDURE — 700105 HCHG RX REV CODE 258: Mod: UD | Performed by: PEDIATRICS

## 2024-01-29 PROCEDURE — A4212 NON CORING NEEDLE OR STYLET: HCPCS

## 2024-01-29 PROCEDURE — 80053 COMPREHEN METABOLIC PANEL: CPT

## 2024-01-29 PROCEDURE — 96409 CHEMO IV PUSH SNGL DRUG: CPT

## 2024-01-29 PROCEDURE — 96450 CHEMOTHERAPY INTO CNS: CPT | Performed by: PEDIATRICS

## 2024-01-29 PROCEDURE — 36591 DRAW BLOOD OFF VENOUS DEVICE: CPT

## 2024-01-29 PROCEDURE — 700111 HCHG RX REV CODE 636 W/ 250 OVERRIDE (IP): Mod: UD | Performed by: PEDIATRICS

## 2024-01-29 PROCEDURE — 89051 BODY FLUID CELL COUNT: CPT

## 2024-01-29 PROCEDURE — 700101 HCHG RX REV CODE 250: Mod: UD | Performed by: PEDIATRICS

## 2024-01-29 PROCEDURE — 99214 OFFICE O/P EST MOD 30 MIN: CPT | Mod: 25 | Performed by: PEDIATRICS

## 2024-01-29 PROCEDURE — RXMED WILLOW AMBULATORY MEDICATION CHARGE: Performed by: PEDIATRICS

## 2024-01-29 PROCEDURE — 82248 BILIRUBIN DIRECT: CPT

## 2024-01-29 PROCEDURE — 88108 CYTOPATH CONCENTRATE TECH: CPT

## 2024-01-29 PROCEDURE — 96450 CHEMOTHERAPY INTO CNS: CPT

## 2024-01-29 PROCEDURE — 85025 COMPLETE CBC W/AUTO DIFF WBC: CPT

## 2024-01-29 RX ORDER — LIDOCAINE AND PRILOCAINE 25; 25 MG/G; MG/G
CREAM TOPICAL PRN
Status: DISCONTINUED | OUTPATIENT
Start: 2024-01-29 | End: 2024-01-30 | Stop reason: HOSPADM

## 2024-01-29 RX ORDER — 0.9 % SODIUM CHLORIDE 0.9 %
20 VIAL (ML) INJECTION PRN
Status: CANCELLED | OUTPATIENT
Start: 2024-01-29

## 2024-01-29 RX ORDER — METHOTREXATE 2.5 MG/1
26.25 TABLET ORAL
Qty: 42 TABLET | Refills: 0 | Status: SHIPPED | OUTPATIENT
Start: 2024-01-29 | End: 2024-03-06 | Stop reason: SDUPTHER

## 2024-01-29 RX ORDER — ONDANSETRON 2 MG/ML
8 INJECTION INTRAMUSCULAR; INTRAVENOUS ONCE
Status: COMPLETED | OUTPATIENT
Start: 2024-01-29 | End: 2024-01-29

## 2024-01-29 RX ORDER — HEPARIN SODIUM,PORCINE 10 UNIT/ML
30 VIAL (ML) INTRAVENOUS PRN
Status: CANCELLED
Start: 2024-01-29

## 2024-01-29 RX ORDER — ONDANSETRON 2 MG/ML
8 INJECTION INTRAMUSCULAR; INTRAVENOUS EVERY 8 HOURS PRN
Status: CANCELLED | OUTPATIENT
Start: 2024-01-29

## 2024-01-29 RX ORDER — PROMETHAZINE HYDROCHLORIDE 6.25 MG/5ML
0.25 SYRUP ORAL EVERY 6 HOURS PRN
Status: CANCELLED | OUTPATIENT
Start: 2024-01-29

## 2024-01-29 RX ORDER — MIDAZOLAM HYDROCHLORIDE 1 MG/ML
2 INJECTION INTRAMUSCULAR; INTRAVENOUS ONCE
Status: COMPLETED | OUTPATIENT
Start: 2024-01-29 | End: 2024-01-29

## 2024-01-29 RX ADMIN — MIDAZOLAM HYDROCHLORIDE 2 MG: 1 INJECTION, SOLUTION INTRAMUSCULAR; INTRAVENOUS at 09:47

## 2024-01-29 RX ADMIN — HEPARIN 500 UNITS: 100 SYRINGE at 10:50

## 2024-01-29 RX ADMIN — VINCRISTINE SULFATE 2 MG: 1 INJECTION, SOLUTION INTRAVENOUS at 10:11

## 2024-01-29 RX ADMIN — ONDANSETRON 8 MG: 2 INJECTION INTRAMUSCULAR; INTRAVENOUS at 08:15

## 2024-01-29 RX ADMIN — METHOTREXATE 12 MG: 25 INJECTION INTRA-ARTERIAL; INTRAMUSCULAR; INTRATHECAL; INTRAVENOUS at 09:58

## 2024-01-29 RX ADMIN — LIDOCAINE AND PRILOCAINE 1 APPLICATION: 25; 25 CREAM TOPICAL at 08:15

## 2024-01-29 ASSESSMENT — FIBROSIS 4 INDEX: FIB4 SCORE: 0.33

## 2024-01-29 NOTE — PROCEDURES
Pediatric Oncology Lumbar Puncture  Procedure Note      Patient Name:  Tomas Jean-Baptiste  : 2000   MRN: [id number]    Service Location:  [facility]  Date of Service: 2024  Time: 9:57 AM    Procedure Performed By: Pepe Faye M.D.    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  He presents today for scheduled chemotherapy, Day 1 of Cycle 4 of Maintenance and scheduled lumbar puncture with intrathecal chemotherapy.  Prior to the procedure, the risks and benefits were discussed with the patient.  Consent for the procedure was signed by patient and placed in his chart.  All pertinent labs and history were reviewed and a complete History and Physical Examination were performed and placed in the medical record.  Tomas Roy was then taken to the procedure room where the procedure was performed.  All necessary safety equipment per ASA guidelines were available.  A time-out was performed and the patient identified by name,  and medical record number.  Gloves and mask were worn during the entire procedure.  Tomas Roy was prepped and draped in the usual sterile fashion with povoiodine.  He was positioned in the left decubitus position and all landmarks including  superior posterior iliac crest, umbilicus and vertebral bodies were identified by palpation.  A single dose of Versed 2 mg IV was given for anxiolysis.  A 3 in, 22 gauge spinal needle was introduced into the L3-L4 spinal interspace.  6 ml of clear fluid were obtained and sent for cell count and cytology.  Methotrexate 12 mg in 6 mL of NS was verified with the nurse bedside and then then administered into the spinal fluid. Tomas Roy tolerated the procedure without complication or bleeding.     Results:    PENDING    Pepe Faye MD  Pediatric Hematology / Oncology  [facility]  Cell.  [phone]

## 2024-01-29 NOTE — PROGRESS NOTES
[facility] for lab draw, Doctor visit and lumbar puncture for IT Chemotherapy and for IV Chemotherapy. Afebrile.  VSS.  Awake and alert in no acute distress.  Port accessed with 22G 3/4 inch with 1 attempt. Labs drawn from the port without difficulty.  Pt tolerated well.      Visit with Dr. Faye completed. Okay to proceed with chemotherapy and LP today.    Labs reviewed and pre-medications given per MD orders, see MAR.     Port patency verified prior to procedure.  Pt medicated with versed per order immediately prior to procedure.    Time out Time: 0951    LP start time: 0956    LP completed at 0958.       See MAR for medication adminsitration.  No unexpected events.      Chemotherapy dosage calculated independently by Yue Poole RN and Huong Foster RN and compared to road map for protocol COG [id number].  Calculations within 10% of written order.     Chemotherapy given as ordered, see MAR.  Blood return verified prior to, during, and after chemotherapy infusion.  See Chemotherapy flowsheet.  Pt tolerated well.  No side effects or complications noted.     Pt remained supine for one hour post LP.  Port flushed per orders (see MAR) and de-accessed.  Discharged home once discharge criteria met.    Pt does state he does not have a working phone at this time, but he can be reached by email at [email or web address]

## 2024-01-29 NOTE — PROGRESS NOTES
"Pharmacy Chemotherapy Verification    Patient Name: Tomas Jean-Baptiste  Dx: pH+ B-Cell ALL         Protocol: Maintenance C3 and subsequent cycles(On Study Arm B, ID number: [id number])         Allergies: Amoxicillin       /85   Pulse (!) 117   Temp 36.6 °C (97.8 °F) (Temporal)   Resp 20   Ht 1.655 m (5' 5.16\")   Wt 65.1 kg (143 lb 8.3 oz)   SpO2 98%   BMI 23.77 kg/m²    Body surface area is 1.73 meters squared.    All lab results 1/29/24 within treatment parameters.     Drug Order   (Drug name, dose, route, IV Fluid & volume, frequency, number of doses) Maintenance, Cycle 4, Day 1  Previous treatment: Maintenance, C3D57 1/2/24   Medication = Vincristine (ONCOVIN)   Base Dose= 1.5 mg/m2  Calc Dose: Base Dose x 1.73m2 = >2mg  Final Dose = 2 mg (MAX)   Route = IV  Fluid & Volume = NS 25 mL  Admin Duration = Over 5 - 10 minutes    Days 1, 29, 57      <10% difference, okay to treat with final max dose   Medication = Methotrexate PF  Base Dose = 12 mg fixed dose  Calc Dose: n/a  Final Dose = 12mg   Route = INTRATHECAL  Fluid & Volume = pf NS 6 mL  Admin Duration = IT per MD Day 1   No calculation required.   okay to treat with final dose   By my signature below, I confirm this process was performed independently with the BSA and all final chemotherapy dosing calculations congruent. I have reviewed the above chemotherapy order and that my calculation of the final dose and BSA (when applicable) corroborate those calculations of the  pharmacist. Discrepancies of 10% or greater in the written dose have been addressed and documented within the [facility] Progress notes.    Galen Wilson, PharmD  "

## 2024-01-29 NOTE — H&P
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 the time demonstrated a WBC of 0.4, Hgb 8.8 and platelets 125.  ANC at the time was measured at only 170.  JULY was otherwise clinically well appearing.  He did receive a one-time normal saline bolus for his nausea and vomiting and was sent home with instructions to HOLD his oral mercaptopurine and oral methotrexate which began being held on 7/28/2023.  Per protocol, imatinib was continued at previous dose of 600 mg in the morning. Tomas Roy would return to clinic again on 8/2/2023 and 8/7/2023.  On both occasions, ANC remained under 500 and oral therapy (with the exception of imatinib) continue to be held while awaiting count recovery.  Ultimately, on 8/11/2023 after 14 days of therapy being held, Tomas Roy returned to clinic and WBC had increased to 2.1 with ANC increasing to 1500 with an absolute  monocyte count of 290. Tomas Roy was then instructed to resume his oral chemotherapy at 100% of prior dosing with (due to timing with Day 1 of Cycle 2 with LP 3 days later, no weekly MTX was administered).  Imatinib was never held.  On 8/14/2023 he was seen in clinic for Day 1 of Cycle 2 of Maintenance with lumbar puncture, vincristine, and 5-day pulse of steroids.  At the time, his ANC was 2010 and his oral chemotherapy was continued at 100% dosing.  Given his history of previously drop in counts, he was scheduled to come back for repeat labs on 8/29/2023 at which point his WBC count was 1.4 and his ANC remained greater than 500 at 1140.  Again, his therapy was continued with imatinib 550 mg by mouth in the morning as well as 100% dosing of methotrexate and 100% dosing of 6-mercaptopurine.  When Tomas Roy returned to clinic on 9/11/2023 for his Maintenance, Cycle 2, Day 29 therapy he was noted to have had another drop in his WBC to 600 and his ANC accordingly was also decreased at 410.  He did receive vincristine in accordance with protocol as well as starting a 5-day pulse of prednisone per protocol.  Given however that his ANC had dropped below 500 his oral methotrexate and oral 6-MP were again held.  He was instructed return to clinic 1 week later for lab evaluations.  On 9/19/2023 he returned to clinic and WBC had improved slightly to 0.8 however ANC remained low at 260 with an absolute monocyte count of 220.  Given that counts not recovered his oral chemotherapy remained held for an additional week.  On 9/26/2023 at 14 days after initially holding chemotherapy, he was seen back in clinic at which time WBC improved again to 1.1 however ANC did not quite recover and remained at 490 with an absolute monocyte count of 330.  Given that 2 weeks had elapsed with myelosuppression, imatinib was held in addition to oral 6-MP and methotrexate. Tomas Roy was instructed to return to clinic on 9/29/2023  for repeat counts.  On 9/29/2023 WBC had improved to 2.4 and ANC had finally recovered with 1530 and an absolute monocyte count of 510.  Given a recovery with ANC greater than 750 and platelets greater than 75, oral chemotherapy was restarted at 50% of initial dosing and imatinib was restarted at 100% of initial dosing.  On 10/9/2023, Tomas Roy returned to clinic for his Day 57 of Cycle 2 visit.  At the time of his visit, his ANC had recovered after holding chemotherapy and then restarting at 50% dosing 11 days prior.  He did however in clinic spiked a temperature 102.5 °F.  For this reason despite his ANC being improved, no dose adjustments were made in his chemotherapy.  He continued with 50% dosing with 100% adherence of his 6-MP and methotrexate as well as his imatinib at 550 mg PO QHS.   Tomas Roy was then seen in clinic again on 11/6/2023 for his Day 1 of Cycle 3 of Maintenance therapy.  At the time, his imatinib dose was adjusted back up to 600 mg daily taken in the morning.  Additionally, his methotrexate was adjusted back up to 75% of expected dosing and his mercaptopurine was increased to 75% of expected dosing as well.  He will return to clinic on 12/4/2023 for his Day 29 of Cycle 3 therapy.  At the time, his ANC was exactly 1500 and therefore no dose adjustments were made and he continued at 75% dosing for oral chemotherapy as well as the imatinib dose of 600 mg daily.  On 1/2/2024 he was seen for his Day 57 evaluations and his ANC had dropped to 1450 again not prompting any change in oral chemotherapy dosing.  Today, he presents for Cycle 4 of Maintenance, Day 1 evaluations.  Interval history is unremarkable.    On presentation today, Tomas Roy is clinically well.  Denies any recent or remote illness.  His energy and activity are at baseline.  He continues to attend school full-time without any absences.  He continues to excel and do well in school.  He does report today that his  "mood is low as a result of \"things\" at home.  He denies however any clinical symptoms or concerns.  He denies any headaches, change in vision or neurologic status changes.  No complaints of any nausea, vomiting, diarrhea or constipation.  He does report occasional loose stools however these are not consistent.  No abdominal discomfort or pain.  No complaints of any skin changes or rashes.  No easy bruising or bleeding.  He reports that he is 100% adherent with all of his oral medications to include methotrexate 10.5 tablets each week (not this week), mercaptopurine 2 tablets daily x 7 days and imatinib 600 mg daily in the morning.  No other concerns or complaints at this time.    Review of Systems:     Constitutional: Afebrile.  No recent remote illness.  Energy and activity are at baseline.  Oral intake and appetite are good.  HENT: Negative for auditory changes, nosebleeds and sore throat.  No mouth sores.  Eyes: Negative for visual changes.  Respiratory: Negative for  shortness of breath.  No cough.  Cardiovascular: Negative.  Gastrointestinal: Negative for nausea, vomiting, abdominal pain, diarrhea, constipation.  Occasional loose stools.  Genitourinary: Negative.  Musculoskeletal: Negative.  Skin: Negative for rash, signs of infection.  Neurological: Negative for numbness, tingling, sensory changes, weakness or headaches.    Endo/Heme/Allergies: Does not bruise/bleed easily.    Psychiatric/Behavioral: No changes in mood, appropriate for age.     PAST MEDICAL HISTORY:     Oncologic History:  2-3 week history of worsening fatigue, right lower extremity pain  Presentation to [facility] and diagnosed with right LE superficial thrombus, subsegmental PE and hyperleukocytosis, anemia and thrombocytopenia  Transferred to [facility] for definitive care  Presenting (local) WBC > 440K, Hgb 10.0, platelets 53, (automated differential ANC 3190, ALC 75,310, absolute monocyte count 18629, absolute blast count 340,560)  Uric Acid 15.6, " phosphorus 5.6, LDH 1114  Rasburicase x 1 dose given   Peripheral Blood flow cytometry demonstrating CD10 pos, CD19 pos, CD20 neg, CD22 dim (60%) 5/28/2022  Peripheral blood FISH for BCR-ABL1 positive in 98% of analyzed cells     Age at Diagnosis: 21 years  White Blood Cell Count at Presentation: > 440 k/uL  Testicular Disease Status: Negative (see procedure note 5/30/2022)  CNS Status: 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, diagnostic LP with WBC 1, RBC 3 and no evidence of leukemic blasts 5/30/2022  Steroid Pre-treatment: None  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     All inclusion/exclusion criteria for [id number] met and consent signed - enrolled 5/29/2022   All inclusion/exclusion criteria for [id number] met and consent signed - enrolled 5/30/2022  Confirmatory bone marrow aspirate and biopsy and diagnostic LP + cytarabine 70 mg IT 5/30/2022  Induction therapy (ON STUDY [id number]) started 5/30/2022  Bone marrow immunohistochemistry consistent with diagnosis of B-ALL comprising 90% of marrow cellularity  Bone marrow sample sent to [facility] for COG purposes:  Flow cytom  Of the blood pressure little high that is a problem is a cultural problem is well and cultural genetic and everything else like that unfortunately breathalyzers such bad heart disease diabetes things like that  populations etry consistent with peripheral blood, cytogenetics remarkable for known t(9;22)  CSF with WBC 1, RBC 3, no blasts identified on cytospin  FISH results available 5/31/2022 making patient eligible for transfer from [place] [address] to [place] [address] as eligibility requirements were met for [place] [address]  Patient unenrolled from [place] [address] (BCR-ABL1 fusion positive) 6/1/2022  Consent signed for [place] [address] and patient enrolled 6/1/2022 (see eligibility checklist from 5/31/2022 and consent note from 6/1/2022)  Imatinib 400 mg PO QAM / 200 mg PO QPM started 6/3/2022  (allowed per [id number])  Patient completed the first 15 days of a Standard 4-drug Induction on 6/13/2022  Start of [facility] [id number](OS), Induction IA Part 2, Day 15 6/13/2022  End of Induction 1A Part 2 - MRD negative  Start of [facility] [id number](OS), Induction IA Part 2, Day 15 7/5/2022  Induction IB DELAYED 2 weeks 14 days from 7/26/2022-8/9/2022) for myelosuppression - Start of Day 22 cytarabine block 8/9/2022  Induction IB Day 42 delayed 9 days for myelosuppression - Day 42 evaluations 9/7/2022  End of Induction IB - Flow cytometric MRD negative, MRD by IgH-TCR PCR [id number]%  Randomization to AR-Experimental Arm B of [id number]  Start of AR-Experimental Arm B, Interim Maintenance 9/29/2022  Weight based increase in dose of imatinib to 400 mg PO AM and 250 mg PM 9/29/2022  Thrombocytopenia not permissive of proceeding with Day 15 of Interim Maintenance  AR-Experimental Arm B, Interim Maintenance, Day 15 delayed 4 days, start 10/17/2022  AR-Experimental Arm B, Interim Maintenance, Day 29, start 11/1/2022  AR-Experimental Arm B, Interim Maintenance, Day 43, start 11/14/2022  Last does of 6-MP 11/27/2022  AR-Experimental Arm B, Delayed Intensification, Day 1, start 12/6/2022  Admission with Severe Septic Shock 12/27/2022  Imatinib HELD 12/27/2022-1/8/2023  AR-Experimental Arm B, Delayed Intensification, Day 29 DELAYED 14 days with start 1/17/2023  Hospitalization 2/7/2023 on Day 50 of Delayed Intensification with left shoulder pain ultimately diagnosed with Bacteroides fragilis infection  AR-Experimental Arm B, Interim Maintenance, Day 1 DELAYED 7 days with start 2/27/2023  AR-Experimental Arm B, Interim Maintenance, Day 11 DELAYED 4 days for platelets 43K on 3/9/2023  AR-Experimental Arm B, Interim Maintenance, Day 11 VCR given and MTX omitted for platelets 43K 3/13/2023  Imatinib HELD 3/30/2023-4/11/2023  AR-Experimental Arm B, Interim Maintenance, Day 21 (DELAYED 22 DAYS) - administered 4/11/2023 with methotrexate 80 mg/m²  "IV  AR-Experimental Arm B, Interim Maintenance, Day 31 (\"true\" Day 31 4/25/2023) - VCR and IT MTX given 4/28/2023 - IV MTX omitted  AR-Experimental Arm B, Interim Maintenance, Day 41 met with counts - administered 5/5/2023 with methotrexate 80 mg/m² IV     Start of Maintenance, Cycle 1, Day 1 -5/22/2023  Cranial radiation 6/5/2023-6/16/2023 (10 fractions)  Maintenance, Cycle 1, Day 29 - 6/23/2023 (no IT MTX given)  Maintenance, Cycle 1, Day 67, presented with fatigue nausea and vomiting found to have ANC less than 500  Methotrexate (oral) and mercaptopurine held 7/27/2023 - continued with imatinib  Methotrexate (oral) and mercaptopurine held 8/2/2023 - continued with imatinib  Methotrexate (oral) and mercaptopurine held 8/7/2023 - continued with imatinib  Restarted methotrexate (oral) and mercaptopurine at 100% previous dosing on 8/11/2023 - continued with imatinib  Maintenance, Cycle 2, Day 1 - 8/14/2023  Maintenance, Cycle 2, Day 29 - 9/11/2023  Methotrexate (oral) and mercaptopurine held 9/11/2023 - continued with imatinib  Methotrexate (oral) and mercaptopurine held 9/19/2023 - continued with imatinib  Methotrexate (oral) and mercaptopurine held 9/26/2023 - HELD imatinib  Methotrexate (oral) restarted at 50% dosing and mercaptopurine restarted at 50% dosing 9/29/2023 - RESTARTED imatinib  Maintenance, Cycle 2, Day 57 - 10/9/2023  Maintenance, Cycle 3, Day 1 - 11/6/2023: Methotrexate (oral) increased to 75% dosing and mercaptopurine increased to 75% dosing.  Imatinib increased to 600 mg QAM 11/6/2023  Maintenance Cycle 3, Day 29: 12/4/2023 No changes made to the dosing of oral chemotherapy  Maintenance, Cycle 4, Day 1: No dose adjustments made however ANC slightly greater than >1500.  Oral chemotherapy did not need weight adjustment.     Past Medical History:    1) Previously Healthy  2) 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  3) Hyperleukocytosis  4) Hyperuricemia  5) Hyperphosphatemia  6) " Right Lower Extremity Superficial Thrombus  7) Subsegmental Pulmonary Embolism  8) 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  9) Bacteroides fragilis soft tissue infection left shoulder  10) Anxiety/Depression     Past Surgical History:     1) Temporary Right IJ Pharesis Catheter Placement 5/28/2022  2) Right-sided Port-A-Cath placement 8/29/2022  3) IR drainage left calf hematoma  4) Left shoulder I&D  5) Cranial XRT 6/5/2023-6/16/2023     Birth/Developmental History:   1st of three children  Unremarkable pregnancy  Unremarkable delivery     Allergies:             Allergies as of 05/27/2022 - Reviewed 05/27/2022   Allergen Reaction Noted    Amoxicillin   04/03/2020      Social History:   Lives at home with mother, brother and sister.  Engineering major at [employer]   Two dogs. Father not involved.     Family History:     Family History             Family History   Problem Relation Age of Onset    No Known Problems Mother      Diabetes Paternal Grandfather      Hypertension Paternal Grandfather      Hyperlipidemia Paternal Grandfather      Cancer Neg Hx      Heart Disease Neg Hx      Stroke Neg Hx           No significant family history of cancer.  Both maternal and paternal family history of diabetes.     Immunizations:  UTD    Medications:   Current Outpatient Medications on File Prior to Encounter   Medication Sig Dispense Refill    mercaptopurine (PURINETHOL) 50 MG Tab Take 2 tablets by mouth in the evening x 7 days of the week. 64 Tablet 5    predniSONE (DELTASONE) 10 MG Tab Take 3.5 tablets by mouth in the morning and evening for 5 Days at Day 1, 29 and 57 of each cycle. 35 Tablet 6    imatinib (GLEEVEC) 400 MG tablet Take 1.5 Tablets by mouth every morning for 30 days. Pt takes 200 mg + 400 mg for a total dose of 600 mg daily 45 Tablet 3    omeprazole (PRILOSEC) 20 MG delayed-release capsule Take 1 Capsule by mouth every day for 180 days. 30 Capsule 5    LORazepam (ATIVAN) 1 MG Tab Take 1 mg by mouth every four hours as needed for  "Anxiety.      sulfamethoxazole-trimethoprim (BACTRIM DS) 800-160 MG tablet Take 2 Tablets by mouth twice daily two days a week. (Patient taking differently: Take 1 Tablet by mouth 2 times a day.) 48 Tablet 11    ondansetron (ZOFRAN ODT) 8 MG TABLET DISPERSIBLE Dissolve 1 Tablet by mouth every 6 hours as needed for Nausea/Vomiting. 10 Tablet 0    methotrexate 2.5 MG tablet Take 10.5 Tablets by mouth every 7 days for 30 days. 42 Tablet 0    Melatonin 5 MG Cap Take 5 mg by mouth at bedtime as needed. (Patient not taking: Reported on 1/29/2024)       No current facility-administered medications on file prior to encounter.       OBJECTIVE:     Vitals:   /85   Pulse (!) 117   Temp 36.6 °C (97.8 °F) (Temporal)   Resp 20   Ht 1.655 m (5' 5.16\")   Wt 65.1 kg (143 lb 8.3 oz)   SpO2 98%     Labs:  Hospital Outpatient Visit on 01/29/2024   Component Date Value    WBC 01/29/2024 2.2 (L)     RBC 01/29/2024 3.15 (L)     Hemoglobin 01/29/2024 10.9 (L)     Hematocrit 01/29/2024 32.0 (L)     MCV 01/29/2024 101.6 (H)     MCH 01/29/2024 34.6 (H)     MCHC 01/29/2024 34.1     RDW 01/29/2024 64.3 (H)     Platelet Count 01/29/2024 223     MPV 01/29/2024 8.8 (L)     Neutrophils-Polys 01/29/2024 75.90 (H)     Lymphocytes 01/29/2024 6.80 (L)     Monocytes 01/29/2024 14.00 (H)     Eosinophils 01/29/2024 2.30     Basophils 01/29/2024 0.50     Immature Granulocytes 01/29/2024 0.50     Nucleated RBC 01/29/2024 0.00     Neutrophils (Absolute) 01/29/2024 1.68 (L)     Lymphs (Absolute) 01/29/2024 0.15 (L)     Monos (Absolute) 01/29/2024 0.31     Eos (Absolute) 01/29/2024 0.05     Baso (Absolute) 01/29/2024 0.01     Immature Granulocytes (a* 01/29/2024 0.01     NRBC (Absolute) 01/29/2024 0.00     Sodium 01/29/2024 139     Potassium 01/29/2024 3.6     Chloride 01/29/2024 107     Co2 01/29/2024 22     Anion Gap 01/29/2024 10.0     Glucose 01/29/2024 84     Bun 01/29/2024 8     Creatinine 01/29/2024 0.61     Calcium 01/29/2024 8.7     " Correct Calcium 01/29/2024 8.5     AST(SGOT) 01/29/2024 24     ALT(SGPT) 01/29/2024 32     Alkaline Phosphatase 01/29/2024 87     Total Bilirubin 01/29/2024 0.7     Albumin 01/29/2024 4.3     Total Protein 01/29/2024 6.4     Globulin 01/29/2024 2.1     A-G Ratio 01/29/2024 2.0     Direct Bilirubin 01/29/2024 <0.2     Indirect Bilirubin 01/29/2024 see below     GFR (CKD-EPI) 01/29/2024 139         Physical Exam:    Constitutional: Well-developed, well-nourished, and in no distress.  Very well-appearing.  HENT: Normocephalic and atraumatic. No nasal congestion or rhinorrhea. Oropharynx is clear and moist. No oral ulcerations or sores.    Eyes: Conjunctivae are normal. Pupils are equal, round.  EOMI.  Nonicteric.  Neck: Normal range of motion of neck, no adenopathy.    Cardiovascular: Normal rate, regular rhythm and normal heart sounds.  No murmur heard. DP/radial pulses 2+, cap refill < 2 sec.  Pulmonary/Chest: Effort normal and breath sounds normal. No respiratory distress. Symmetric expansion.  No crackles or wheezes.  Abdomen: Soft. Bowel sounds are normal. No distension and no mass. There is no hepatosplenomegaly.    Genitourinary:  Deferred  Neurological: Alert and oriented to person and place. Exhibits normal muscle tone bilaterally in upper and lower extremities. Gait normal. Coordination normal.    Skin: Skin is warm, dry and pink.  No rash or evidence of skin infection.  No pallor.   Psychiatric: Mood flat today.      ASSESSMENT AND PLAN:     Tomas Jean-Baptiste is a previously healthy 23 year old male with  Precursor B-Cell Lymphoblastic Leukemia with BCR-ABL1 fusion and whose End of Induction IB MRD was both negative by flow cytometry and PCR who presents for Maintenance, Cycle 4, Day 1     1) Ph+ Precursor B-Cell Acute Lymphoblastic Leukemia, in MRD Remission:  - 2-3 weeks of symptoms  - Presenting WBC > 440 k/uL, hyperleukocytosis  - Start of Hydroxyurea (1500 mg PO Q8) 2320 on 5/27/2022  -  discontinued after only 55 hours  - No steroid pretreatment  - 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  - Testicular status NEGATIVE       - Flow cytometry of both 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, FISH positive for BCR-ABL1 translocation  - Enrolled on [facility] [id number]  - Initially enrolled on [facility] [id number] - but 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                            - Enrolled on [facility] [id number] and began study 6/13/2022              - Started imatinib therapy 6/3/2022   - End of Induction IA and IB MRD negative  - Imatinib dose increased to 400 mg PO AM and 250 mg PM 9/29/2022   -* Note:  All imatinib doses given inpatient rounded down to 600 mg   - Imatinib held for Septic Shock 12/27/2022-1/8/2023   - Imatinib 600 mg PO daily restarted 1/8/2023 inpatient   - Imatinib 400 mg PO QAM and 250 mg PO QHS 1/14/2023-1/17/2023   - Imatinib 600 mg PO QAM 1/17/2023 - 3/30/2023   - Imatinib 600 mg PO QAM 4/11/2023 - 8/13/2023   - Imatinib 550 mg PO QAM 8/14/2023 - 9/26/2023   - Imatinib 550 mg PO QAM 9/29/2023 -11/6/2023   - Imatinib 600 mg PO QAM 11/6/2023 - PRESENT (Updated for weight 1/29/2024)     - Imatinib dosing changed every 12 weeks. Continue Imatinib at 600 mg PO QAM (1/29/2024)               - WBC 2.2, Hgb 10.9, platelets 223   - ANC 1680,    - AST 24, ALT 32, total bilirubin 0.7                - ANC today greater than 1500 (1st time) - will not make any dose adjustments today however if future labs demonstrate inadequate myelosuppression, will increase methotrexate     [id number](OS), AR Arm B, Maintenance, Cycle 4, Day 1:   ** Continue Imatinib at 600 mg PO QAM  ** Vincristine 2 mg IV x 1 dose (GIVEN TODAY)  ** Begin pulse of prednisone 35mg PO QAM and 35 mg PO QHS x 5 days           ** Continue oral MTX to 10.5 tablets PO weekly   ** Continue oral 6-mercaptopurine 2 tablets PO daily x 7 days      - Return to infusion center in 1 month for  Cycle 4, Day 29     2) Chemotherapy Related Pancytopenia:           - WBC 2.2, Hgb 10.9, platelets 223   - ANC 1680,   - Transfuse for hemoglobin less than 7 gm/dL or symptomatic  - Transfuse for platelets less than 10,000/microliters or symptomatic  - No transfusions necessary today     3) At Risk of Opportunistic Lung Infection:  - Bactrim DS PO BID Sat and Sun for PJP prophylaxis     4) Central Access:   - R Port-A-Cath in place      5) Research Participant: ON STUDY [id number], AR Arm B, Maintenance, Cycle 4, Day 1             [facility] - Source Data         Diagnosis: Ph+ Precursor B-Cell Acute Lymphoblastic Leukemia     Disease Status: EOI1A MRD NEGATIVE, EOI1B RD NEGATIVE, CNS3c, testicular negative, HSV1 IgG POSITIVE, CMV IgG NEGATIVE, VARICELLA IgG POSITIVE     Active Studies: [id number], [id number]                                                                                                      Inactive Studies: [id number]                                                                                                                                                Optional Studies: None             Protocol: International Phase 3 trial in [place] 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      Treatment Plan: [id number](OS), AR-Arm B, Maintenance, Cycle 4, Day 1 (1/29/2024)     Height: 1.65 m     Weight: 65.1 kg     BSA: 1.73 m²   (Maintenance, Cycle 4,   Day 1, 1/29/2024: Height and weight updated per protocol)                                                                                                                                           Performance Status: Karnofsky 90, ECOG 1 (1/29/2024)     Treatment Plan Medications:  (100% adherent)     Continue oral 6- mercaptopurine 2 tabs x 7 days   Continue oral MTX to 10.5 tabs weekly   Continue Imatinib to 600 mg PO QHS       Evaluations / Study Labs:  (1/29/2024)     WBC 2.2, Hgb 10.9, platelets 223  ANC 1680, , AST 24, ALT 32, total bilirubin 0.7    Therapy Given: (1/29/2024)     Oral chemotherapy as above  Methotrexate 12 mg IT x 1 dose  Vincristine 2 mg IV x 1  Start 5 days pulse of oral prednisone     Maintenance, Cycle 4 Toxicities:     None         Disposition: Return to infusion center in 1 month for Day 29 of Cycle 4 of Maintenance     Pepe Faye MD  Pediatric Hematology / Oncology  [facility]  Cell.  [phone]  Office. [phone]

## 2024-01-30 ENCOUNTER — APPOINTMENT (OUTPATIENT)
Dept: INFUSION CENTER | Facility: MEDICAL CENTER | Age: 24
End: 2024-01-30
Attending: PEDIATRICS
Payer: COMMERCIAL

## 2024-01-30 ENCOUNTER — TELEPHONE (OUTPATIENT)
Dept: PEDIATRIC HEMATOLOGY/ONCOLOGY | Facility: MEDICAL CENTER | Age: 24
End: 2024-01-30
Payer: MEDICAID

## 2024-01-30 NOTE — PROGRESS NOTES
Medical Social Work    REGIS met with patient during his treatment visit with [facility]. REGIS had received word from his treating RN's that patient seemed to have something bothering him, but would not disclose.     Patient reported he is currently residing with his aunt, who lives in [place]. Patient stated he moved out recently due to his relationship with his mom. Patient stated he moved out to salvage the small amount of relationship he has left with his mom. Patient stated he currently does not have his car or phone due to his mom keeping them since they are in her name. Patient stated since he has been getting stronger and feeling much better overall, it has shifted the care his mom was providing and ultimately the dynamic of their relationship. It appears mom is struggling with patient's independence, and feeling as though she is not needed anymore. Patient states this has put a strain on their relationship, and his relationship with his girlfriend. Patient stated his mom has said many times she does not like his girlfriend even though they have been dating for roughly 3 years.    Patient states he is hopeful to have his phone and car back soon, but he knows he cannot be without a phone for long. REGIS told patient if he needs assistance with getting a phone and his own service plan, REGIS can assist with this process. Additionally, if patient is unable to get a ride from family for appointments, REGIS can schedule [employer] rides for patient.    Plan: REGIS will continue to follow in clinic and provide support as needed.

## 2024-01-30 NOTE — TELEPHONE ENCOUNTER
Contact:  Phone number: Patient is currently without a phone and has been responding via email with Leslie    Name of person spoken with and relationship to patient: JULY (self) via email   Patient’s Adherence:  How patient is doing on medication: Well    How many missed doses and reason: 0 N/A    Any new medications: No    Any new conditions: No    Any new allergies: No    Any new side effects: No    Any new diagnoses: No    How many doses remaining: at least a week    Did patient want to speak with pharmacist: No   Delivery:  Delivery date and method: 2/1/24 via [employer]    Needs by Date: 2/1/24    Signature required: No     Any additional details for : N/A   Teach Appointment Date:  N/A   Shipping Address:  [address] [place] [address] [place] [place] [address]   Medication(name,strength and dose):  mercaptopurine (PURINETHOL) 50 MG Tab, methotrexate 2.5 MG tablet, predniSONE (DELTASONE) 10 MG Tab, omeprazole (PRILOSEC) 20 MG delayed-release capsule, and sulfamethoxazole-trimethoprim (BACTRIM DS) 800-160 MG tablet   Copay:  $0   Payment Method:  n/a $0 copay   Supplies:  NA   Additional Information:  MARIAELENA Benito [facility]   Pharmacy Liaison  [phone]  1/30/2024 12:36 PM

## 2024-01-31 ENCOUNTER — PHARMACY VISIT (OUTPATIENT)
Dept: PHARMACY | Facility: MEDICAL CENTER | Age: 24
End: 2024-01-31
Payer: COMMERCIAL

## 2024-01-31 ENCOUNTER — APPOINTMENT (OUTPATIENT)
Dept: PSYCHOLOGY | Facility: MEDICAL CENTER | Age: 24
End: 2024-01-31
Attending: PSYCHOLOGIST
Payer: COMMERCIAL

## 2024-02-02 DIAGNOSIS — C91.Z0 B LYMPHOBLASTIC LEUKEMIA WITH T(9;22)(Q34;Q11.2);BCR-ABL1 (HCC): ICD-10-CM

## 2024-02-06 ENCOUNTER — OFFICE VISIT (OUTPATIENT)
Dept: PSYCHOLOGY | Facility: MEDICAL CENTER | Age: 24
End: 2024-02-06
Attending: PSYCHOLOGIST
Payer: COMMERCIAL

## 2024-02-06 ENCOUNTER — PATIENT OUTREACH (OUTPATIENT)
Dept: HEALTH INFORMATION MANAGEMENT | Facility: OTHER | Age: 24
End: 2024-02-06

## 2024-02-06 DIAGNOSIS — C91.Z0 B LYMPHOBLASTIC LEUKEMIA WITH T(9;22)(Q34;Q11.2);BCR-ABL1 (HCC): ICD-10-CM

## 2024-02-06 PROCEDURE — 96158 HLTH BHV IVNTJ INDIV 1ST 30: CPT | Performed by: PSYCHOLOGIST

## 2024-02-06 PROCEDURE — 2023F DILAT RTA XM W/O RTNOPTHY: CPT | Performed by: PSYCHOLOGIST

## 2024-02-06 PROCEDURE — 96159 HLTH BHV IVNTJ INDIV EA ADDL: CPT | Performed by: PSYCHOLOGIST

## 2024-02-08 NOTE — PROGRESS NOTES
PEDIATRIC BEHAVIORAL HEALTH VISIT    ADULT REQUEST FOR CONFIDENTIALITY    Name:  Tomas Jean-Baptiste  MRN:  [id number]  :  2000  Age:  23 y.o.  Referring Provider: Dr. Faye (Hem/Onc)  Pediatrician:  Margarito Arvizu M.D.  Date of Service:  2024    Persons in Attendance: Tomas Roy     Chief Complaint/ Reason for Appointment: JULY, a now 22 y/o male currently in treatment for [place] Chromosome Precursor B-Cell Acute Lymphoblastic Leukemia was referred to counseling to assist in decreasing anxiety he has been experiencing and processing ways for how he can find himself now and what life will look like. See intake note dated 23    Mental Status Exam:   General description In no apparent distress, well-groomed, appropriately attired, well-nourished, and cooperative with interview  Interactional style Culturally appropriate  Eye contact Normal and appropriate for culture  Speech Unimpaired, fluid and clear, normal rate and rythem  Motor activity Normal motor activity  Orientation Oriented to person time, place and situation  Intellectual functioning Unimpaired  Memory Unimpaired  Attention and concentration Intact and normative concentration  Fund of knowledge Average  Mood Dysphoric  Affect Tearful  Perceptual Disturbances None apparent  Thought Process  No abnormalities apparent       Associations Unimpaired associations       Abstractions Normal abstractions, intact       Insight Insight - adequate and normative       Judgment Judgments - intact and normative   Thought Content  No apparent delusions    Risk Assessment:  Tomas Roy denied current concerns regarding risk to self or others.       Issues Discussed:   This provider met with JULY to continue assisting in rediscovering himself after the trauma of his cancer diagnosis and treatment as well as how childhood impacts this. Today the session was spent processing what occurred when he moved out of his home. JULY was tearful  while talking about how his mother kept accusing him of giving her attitude and it led to her suggesting he go stay with his aunt for a week. Given that JULY has struggled with his mother controlling him over the recent years he ended up moving all his belongings out when he felt she could not be reasoned with. He is now staying with his girlfriend's parents and struggling to know what to do financially. He reported leaving all his birthday and scholarship money at his mother's house. Since he has moved out, JULY reported that his mother has talked badly about him to his family and made statements that she will not attend his treatment completion bell ringing or his college graduation. This is causing JULY tremendous sadness and grief, but he voices continuing to want to fight and create a better life for himself.       Techniques and Interventions Used: Psycho-education and Cognitive Behavioral Therapy (CBT)      Treatment Recommendations and Plan:  JULY, a now 22 y/o male currently in treatment for [place] Chromosome Precursor B-Cell Acute Lymphoblastic Leukemia was referred to counseling to assist in decreasing anxiety he has been experiencing and processing ways for how he can find himself now and what life will look like. After meeting with JULY during his intake, it appears he could benefit from learning anxiety management skills, processing what he has been through, and how to live the life he wants. Tomas Roy Nabeel Jean-Baptiste could benefit from learning appropriate ways of expressing and coping with his emotions. He could also benefit from learning Cognitive Behavioral Therapy (CBT) and Acceptance and Commitment Therapy (ACT) tools to understand the interaction of thoughts, feelings, and actions. As well as Trauma Focused-CBT to process his cancer journey. Tomas Jean-Baptiste agreed with the plan.       PLAN  JULY will learn and utilize appropriate ways to express and cope with emotions.     JULY has done a lot of writing/journaling of his thoughts/feelings.   He will learn the connection between thoughts, feelings, and actions utilizing CBT and ACT tools.,   Processed the thoughts that arise around his mother.  JULY will process how their medical diagnosis is impacting their life.  Talked about how he is now being forced to reclaim his life.     Next visit we will process what has occurred with his mother and ways he is coping with this additional life stressor.    The above diagnostic impressions, recommendations, and treatment plan were discussed with and agreed upon by Tomas Roy, and his caregivers. Care will be coordinated with Tomas Roy's healthcare team, as appropriate.    Total time spent on encounter was 90 minutes.    Laurie Ortega, PhD  Pediatric Psychologist   Licensed Psychologist, [place] # [id number]  [facility], Behavioral Health

## 2024-02-12 NOTE — PROGRESS NOTES
"Medical Social Work    SW followed up with patient after receiving a v/m from patient earlier in the day. Patient reported being in need of more help and provided an update to the situation with his mom.     Patient reported his mom has \"officially kicked him out\" of the home, despite residing with his aunt to give them both space from each other. Patient states he is no longer welcome at home, and he is now living with his girlfriend at her parents home. Patient reports he has nothing, as his mom kept his car, his cell phone, and his money. Patient states his car is listed in both his and his mother's names, but she is still refusing to allow him to have it. Patient states he really only wants his car back and is seeking guidance on what to do next. Patient reports he received roughly $32k in scholarships and internships that his mom is keeping. Patient stated he had the money deposited into his mother's account because the money was for his family and to help out. Patient states he is not worried about the money and his main focus is on his car. SW explained in order to obtain his car and the scholarship/internship money he earned, he would need to initiate either an Arbitration or Short Trial case due to the value of assets he has lost. REGIS warned, however, this route could further tarnish his relationship with his mom. REGIS stated if patient did want to proceed with handle the issue in court, resources to do so can be provided. Patient states he doesn't think he wants to go to court at this time, though he is unsure of any reconciliation with his mom.     Patient has reached out to [facility], they will be assisting with gas for medical appointments and utilities. Patient is trying to contribute to his GF's parents home.     Patient is currently receiving disability, but again checks are being sent to his mother's home. SW provided patient with [employer] phone number to call and update his address. 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 SW notified patient the income reported will put him over the income limitation for Medicaid and continuing to receive his disability benefits. SW asked if there is any chance to have the internship unpaid, which he is unsure of. Patient will seek further information.    Patient has asked for resources to get a new phone and plan as the phone he is using now is not very reliable but is making due in the meantime.    Plan:   - REGIS will touch base with [facility] about this situation to collaborate with financial care for patient  - REGIS will review options for obtaining a cell phone and new phone plan  - REGIS will continue to provide support

## 2024-02-20 ENCOUNTER — TELEMEDICINE (OUTPATIENT)
Dept: PSYCHOLOGY | Facility: MEDICAL CENTER | Age: 24
End: 2024-02-20
Attending: PSYCHOLOGIST
Payer: COMMERCIAL

## 2024-02-20 DIAGNOSIS — C91.Z0 B LYMPHOBLASTIC LEUKEMIA WITH T(9;22)(Q34;Q11.2);BCR-ABL1 (HCC): ICD-10-CM

## 2024-02-20 PROCEDURE — 96159 HLTH BHV IVNTJ INDIV EA ADDL: CPT | Performed by: PSYCHOLOGIST

## 2024-02-20 PROCEDURE — 96158 HLTH BHV IVNTJ INDIV 1ST 30: CPT | Performed by: PSYCHOLOGIST

## 2024-02-20 NOTE — PROGRESS NOTES
PEDIATRIC BEHAVIORAL HEALTH VISIT    ADULT REQUEST FOR CONFIDENTIALITY    Name:  Tomas Jean-Baptiste  MRN:  [id number]  :  2000  Age:  23 y.o.  Referring Provider: Dr. Faye (Hem/Onc)  Pediatrician:  Margarito Arvizu M.D.  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 See intake note dated 23    Mental Status Exam:   General description In no apparent distress, well-groomed, appropriately attired, well-nourished, and cooperative with interview  Interactional style Culturally appropriate  Eye contact Normal and appropriate for culture  Speech Unimpaired, fluid and clear, normal rate and rythem  Motor activity Normal motor activity  Orientation Oriented to person time, place and situation  Intellectual functioning Unimpaired  Memory Unimpaired  Attention and concentration Intact and normative concentration  Fund of knowledge Average  Mood Generally euthymic  Affect Appropriate  Perceptual Disturbances None apparent  Thought Process  No abnormalities apparent       Associations Unimpaired associations       Abstractions Normal abstractions, intact       Insight Insight - adequate and normative       Judgment Judgments - intact and normative   Thought Content  No apparent delusions    Risk Assessment:  Tomas Roy denied current concerns regarding risk to self or others.       Issues Discussed:   This provider met with JULY to continue assisting in rediscovering himself after the trauma of his cancer diagnosis and treatment as well as how childhood impacts this. Today the session was spent continuing to process the situation with his mother and his view  of things. Overall, JULY reports that he is settling in at his girlfriend's parent's house and working to give himself hilda and accept help from others. Time was spent processing things that JULY has learned about his mother and how she may be unable to move from her position in this situation. JULY feels that he gave her an apology, but does not want to move back in. He has been having increased nightmares and we discussed finding ways to let the hurt and anger out physically, artistically, musically, or through writing. Since JULY has gotten back into creating pictures we discussed creating a picture of how life feels right now. The remainder of the time was spent reviewing his relationship with his father and that side of the family.     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 After meeting with JULY during his intake, it appears he could benefit from learning anxiety management skills, processing what he has been through, and how to live the life he wants. Tomas Roy Nabeel Jean-Baptiste could benefit from learning appropriate ways of expressing and coping with his emotions. He could also benefit from learning Cognitive Behavioral Therapy (CBT) and Acceptance and Commitment Therapy (ACT) tools to understand the interaction of thoughts, feelings, and actions. As well as Trauma Focused-CBT to process his cancer journey. Tomas Jean-Baptiste agreed with the plan.       PLAN  JULY will learn and utilize appropriate ways to express and cope with emotions.    Discussed creating a picture of how life feels right now.   He will learn the  connection between thoughts, feelings, and actions utilizing CBT and ACT tools.,   Processed the thoughts that arise around his mother and her actions.  JULY will process how their medical diagnosis is impacting their life.    Next visit we will process what has occurred with his mother and ways he is coping with this additional life stressor.    The above diagnostic impressions, recommendations, and treatment plan were discussed with and agreed upon by Tomasmarilyn Roy, and his caregivers. Care will be coordinated with Tomas Roy's healthcare team, as appropriate.    Total time spent on encounter was 60 minutes.    Laurie Ortega, PhD  Pediatric Psychologist   Licensed Psychologist, [place] # [id number]  [facility], Behavioral Health

## 2024-02-23 RX ORDER — ONDANSETRON 2 MG/ML
8 INJECTION INTRAMUSCULAR; INTRAVENOUS EVERY 8 HOURS PRN
OUTPATIENT
Start: 2024-04-24

## 2024-02-23 RX ORDER — PROMETHAZINE HYDROCHLORIDE 6.25 MG/5ML
0.25 SYRUP ORAL EVERY 6 HOURS PRN
Status: CANCELLED | OUTPATIENT
Start: 2024-08-15

## 2024-02-23 RX ORDER — ONDANSETRON 2 MG/ML
8 INJECTION INTRAMUSCULAR; INTRAVENOUS EVERY 8 HOURS PRN
Status: CANCELLED | OUTPATIENT
Start: 2024-09-12

## 2024-02-23 RX ORDER — ONDANSETRON 2 MG/ML
8 INJECTION INTRAMUSCULAR; INTRAVENOUS EVERY 8 HOURS PRN
Status: CANCELLED | OUTPATIENT
Start: 2024-03-25

## 2024-02-23 RX ORDER — PROMETHAZINE HYDROCHLORIDE 6.25 MG/5ML
0.25 SYRUP ORAL EVERY 6 HOURS PRN
OUTPATIENT
Start: 2024-05-22

## 2024-02-23 RX ORDER — PROMETHAZINE HYDROCHLORIDE 6.25 MG/5ML
0.25 SYRUP ORAL EVERY 6 HOURS PRN
Status: CANCELLED | OUTPATIENT
Start: 2024-09-12

## 2024-02-23 RX ORDER — ONDANSETRON 2 MG/ML
8 INJECTION INTRAMUSCULAR; INTRAVENOUS ONCE
Status: CANCELLED | OUTPATIENT
Start: 2024-03-25

## 2024-02-23 RX ORDER — ONDANSETRON 2 MG/ML
8 INJECTION INTRAMUSCULAR; INTRAVENOUS EVERY 8 HOURS PRN
Status: CANCELLED | OUTPATIENT
Start: 2024-07-18

## 2024-02-23 RX ORDER — PROMETHAZINE HYDROCHLORIDE 6.25 MG/5ML
0.25 SYRUP ORAL EVERY 6 HOURS PRN
Status: CANCELLED | OUTPATIENT
Start: 2024-07-18

## 2024-02-23 RX ORDER — MIDAZOLAM HYDROCHLORIDE 1 MG/ML
2 INJECTION INTRAMUSCULAR; INTRAVENOUS ONCE
Status: CANCELLED
Start: 2024-07-18 | End: 2024-07-18

## 2024-02-23 RX ORDER — ONDANSETRON 2 MG/ML
8 INJECTION INTRAMUSCULAR; INTRAVENOUS EVERY 8 HOURS PRN
OUTPATIENT
Start: 2024-05-22

## 2024-02-23 RX ORDER — ONDANSETRON 2 MG/ML
8 INJECTION INTRAMUSCULAR; INTRAVENOUS EVERY 8 HOURS PRN
Status: CANCELLED | OUTPATIENT
Start: 2024-06-19

## 2024-02-23 RX ORDER — ONDANSETRON 2 MG/ML
8 INJECTION INTRAMUSCULAR; INTRAVENOUS ONCE
Status: CANCELLED | OUTPATIENT
Start: 2024-08-15

## 2024-02-23 RX ORDER — ONDANSETRON 2 MG/ML
8 INJECTION INTRAMUSCULAR; INTRAVENOUS ONCE
Status: CANCELLED | OUTPATIENT
Start: 2024-07-18

## 2024-02-23 RX ORDER — PROMETHAZINE HYDROCHLORIDE 6.25 MG/5ML
0.25 SYRUP ORAL EVERY 6 HOURS PRN
OUTPATIENT
Start: 2024-04-24

## 2024-02-23 RX ORDER — ONDANSETRON 2 MG/ML
8 INJECTION INTRAMUSCULAR; INTRAVENOUS EVERY 8 HOURS PRN
Status: CANCELLED | OUTPATIENT
Start: 2024-02-27

## 2024-02-23 RX ORDER — PROMETHAZINE HYDROCHLORIDE 6.25 MG/5ML
0.25 SYRUP ORAL EVERY 6 HOURS PRN
Status: CANCELLED | OUTPATIENT
Start: 2024-06-19

## 2024-02-23 RX ORDER — PROMETHAZINE HYDROCHLORIDE 6.25 MG/5ML
0.25 SYRUP ORAL EVERY 6 HOURS PRN
Status: CANCELLED | OUTPATIENT
Start: 2024-02-27

## 2024-02-23 RX ORDER — PROMETHAZINE HYDROCHLORIDE 6.25 MG/5ML
0.25 SYRUP ORAL EVERY 6 HOURS PRN
Status: CANCELLED | OUTPATIENT
Start: 2024-03-25

## 2024-02-23 RX ORDER — ONDANSETRON 2 MG/ML
8 INJECTION INTRAMUSCULAR; INTRAVENOUS EVERY 8 HOURS PRN
Status: CANCELLED | OUTPATIENT
Start: 2024-08-15

## 2024-02-23 RX ORDER — ONDANSETRON 2 MG/ML
8 INJECTION INTRAMUSCULAR; INTRAVENOUS ONCE
Status: CANCELLED | OUTPATIENT
Start: 2024-06-19

## 2024-02-23 RX ORDER — ONDANSETRON 2 MG/ML
8 INJECTION INTRAMUSCULAR; INTRAVENOUS ONCE
Status: CANCELLED | OUTPATIENT
Start: 2024-09-12

## 2024-02-23 RX ORDER — MIDAZOLAM HYDROCHLORIDE 1 MG/ML
2 INJECTION INTRAMUSCULAR; INTRAVENOUS ONCE
Start: 2024-04-24 | End: 2024-04-24

## 2024-02-23 RX ORDER — ONDANSETRON 2 MG/ML
8 INJECTION INTRAMUSCULAR; INTRAVENOUS ONCE
Status: CANCELLED | OUTPATIENT
Start: 2024-02-27

## 2024-02-23 RX ORDER — ONDANSETRON 2 MG/ML
8 INJECTION INTRAMUSCULAR; INTRAVENOUS ONCE
OUTPATIENT
Start: 2024-04-24

## 2024-02-23 RX ORDER — ONDANSETRON 2 MG/ML
8 INJECTION INTRAMUSCULAR; INTRAVENOUS ONCE
OUTPATIENT
Start: 2024-05-22

## 2024-02-26 ENCOUNTER — HOSPITAL ENCOUNTER (OUTPATIENT)
Dept: INFUSION CENTER | Facility: MEDICAL CENTER | Age: 24
End: 2024-02-26
Attending: PEDIATRICS
Payer: MEDICAID

## 2024-02-26 VITALS
OXYGEN SATURATION: 98 % | HEART RATE: 78 BPM | HEIGHT: 65 IN | WEIGHT: 142.64 LBS | SYSTOLIC BLOOD PRESSURE: 109 MMHG | TEMPERATURE: 99.1 F | DIASTOLIC BLOOD PRESSURE: 73 MMHG | RESPIRATION RATE: 20 BRPM | BODY MASS INDEX: 23.76 KG/M2

## 2024-02-26 DIAGNOSIS — Z51.11 ENCOUNTER FOR CHEMOTHERAPY MANAGEMENT: ICD-10-CM

## 2024-02-26 DIAGNOSIS — C91.Z0 B LYMPHOBLASTIC LEUKEMIA WITH T(9;22)(Q34;Q11.2);BCR-ABL1 (HCC): ICD-10-CM

## 2024-02-26 DIAGNOSIS — C91.01 ACUTE LYMPHOBLASTIC LEUKEMIA (ALL) IN REMISSION (HCC): ICD-10-CM

## 2024-02-26 LAB
ALBUMIN SERPL BCP-MCNC: 4.4 G/DL (ref 3.2–4.9)
ALBUMIN/GLOB SERPL: 2.3 G/DL
ALP SERPL-CCNC: 105 U/L (ref 30–99)
ALT SERPL-CCNC: 36 U/L (ref 2–50)
ANION GAP SERPL CALC-SCNC: 11 MMOL/L (ref 7–16)
ANISOCYTOSIS BLD QL SMEAR: ABNORMAL
AST SERPL-CCNC: 23 U/L (ref 12–45)
BASOPHILS # BLD AUTO: 2.5 % (ref 0–1.8)
BASOPHILS # BLD: 0.05 K/UL (ref 0–0.12)
BILIRUB CONJ SERPL-MCNC: <0.2 MG/DL (ref 0.1–0.5)
BILIRUB INDIRECT SERPL-MCNC: NORMAL MG/DL (ref 0–1)
BILIRUB SERPL-MCNC: 0.7 MG/DL (ref 0.1–1.5)
BUN SERPL-MCNC: 7 MG/DL (ref 8–22)
CALCIUM ALBUM COR SERPL-MCNC: 8.1 MG/DL (ref 8.5–10.5)
CALCIUM SERPL-MCNC: 8.4 MG/DL (ref 8.5–10.5)
CHLORIDE SERPL-SCNC: 106 MMOL/L (ref 96–112)
CO2 SERPL-SCNC: 23 MMOL/L (ref 20–33)
CREAT SERPL-MCNC: 0.63 MG/DL (ref 0.5–1.4)
EOSINOPHIL # BLD AUTO: 0.18 K/UL (ref 0–0.51)
EOSINOPHIL NFR BLD: 9.3 % (ref 0–6.9)
ERYTHROCYTE [DISTWIDTH] IN BLOOD BY AUTOMATED COUNT: 67.2 FL (ref 35.9–50)
GFR SERPLBLD CREATININE-BSD FMLA CKD-EPI: 138 ML/MIN/1.73 M 2
GLOBULIN SER CALC-MCNC: 1.9 G/DL (ref 1.9–3.5)
GLUCOSE SERPL-MCNC: 94 MG/DL (ref 65–99)
HCT VFR BLD AUTO: 34.8 % (ref 42–52)
HGB BLD-MCNC: 11.9 G/DL (ref 14–18)
LYMPHOCYTES # BLD AUTO: 0.08 K/UL (ref 1–4.8)
LYMPHOCYTES NFR BLD: 4.2 % (ref 22–41)
MACROCYTES BLD QL SMEAR: ABNORMAL
MANUAL DIFF BLD: NORMAL
MCH RBC QN AUTO: 34.8 PG (ref 27–33)
MCHC RBC AUTO-ENTMCNC: 34.2 G/DL (ref 32.3–36.5)
MCV RBC AUTO: 101.8 FL (ref 81.4–97.8)
METAMYELOCYTES NFR BLD MANUAL: 0.9 %
MONOCYTES # BLD AUTO: 0.26 K/UL (ref 0–0.85)
MONOCYTES NFR BLD AUTO: 13.6 % (ref 0–13.4)
MORPHOLOGY BLD-IMP: NORMAL
NEUTROPHILS # BLD AUTO: 1.32 K/UL (ref 1.82–7.42)
NEUTROPHILS NFR BLD: 69.5 % (ref 44–72)
NRBC # BLD AUTO: 0 K/UL
NRBC BLD-RTO: 0 /100 WBC (ref 0–0.2)
OVALOCYTES BLD QL SMEAR: NORMAL
PLATELET # BLD AUTO: 237 K/UL (ref 164–446)
PLATELET BLD QL SMEAR: NORMAL
PMV BLD AUTO: 9.5 FL (ref 9–12.9)
POIKILOCYTOSIS BLD QL SMEAR: NORMAL
POTASSIUM SERPL-SCNC: 3.7 MMOL/L (ref 3.6–5.5)
PROT SERPL-MCNC: 6.3 G/DL (ref 6–8.2)
RBC # BLD AUTO: 3.42 M/UL (ref 4.7–6.1)
RBC BLD AUTO: PRESENT
SODIUM SERPL-SCNC: 140 MMOL/L (ref 135–145)
WBC # BLD AUTO: 1.9 K/UL (ref 4.8–10.8)

## 2024-02-26 PROCEDURE — 700111 HCHG RX REV CODE 636 W/ 250 OVERRIDE (IP): Mod: JZ,UD | Performed by: PEDIATRICS

## 2024-02-26 PROCEDURE — 82248 BILIRUBIN DIRECT: CPT

## 2024-02-26 PROCEDURE — 85007 BL SMEAR W/DIFF WBC COUNT: CPT

## 2024-02-26 PROCEDURE — 96375 TX/PRO/DX INJ NEW DRUG ADDON: CPT

## 2024-02-26 PROCEDURE — 80053 COMPREHEN METABOLIC PANEL: CPT

## 2024-02-26 PROCEDURE — 96409 CHEMO IV PUSH SNGL DRUG: CPT

## 2024-02-26 PROCEDURE — 99214 OFFICE O/P EST MOD 30 MIN: CPT | Performed by: PEDIATRICS

## 2024-02-26 PROCEDURE — 36591 DRAW BLOOD OFF VENOUS DEVICE: CPT

## 2024-02-26 PROCEDURE — 700105 HCHG RX REV CODE 258: Mod: UD | Performed by: PEDIATRICS

## 2024-02-26 PROCEDURE — A4212 NON CORING NEEDLE OR STYLET: HCPCS

## 2024-02-26 PROCEDURE — 85027 COMPLETE CBC AUTOMATED: CPT

## 2024-02-26 RX ORDER — HEPARIN SODIUM,PORCINE 10 UNIT/ML
30 VIAL (ML) INTRAVENOUS PRN
Status: CANCELLED
Start: 2024-02-26

## 2024-02-26 RX ORDER — 0.9 % SODIUM CHLORIDE 0.9 %
20 VIAL (ML) INJECTION PRN
Status: CANCELLED | OUTPATIENT
Start: 2024-02-26

## 2024-02-26 RX ORDER — ONDANSETRON 2 MG/ML
8 INJECTION INTRAMUSCULAR; INTRAVENOUS ONCE
Status: COMPLETED | OUTPATIENT
Start: 2024-02-26 | End: 2024-02-26

## 2024-02-26 RX ADMIN — ONDANSETRON 8 MG: 2 INJECTION INTRAMUSCULAR; INTRAVENOUS at 09:31

## 2024-02-26 RX ADMIN — HEPARIN 500 UNITS: 100 SYRINGE at 09:48

## 2024-02-26 RX ADMIN — VINCRISTINE SULFATE 2 MG: 1 INJECTION, SOLUTION INTRAVENOUS at 09:42

## 2024-02-26 ASSESSMENT — FIBROSIS 4 INDEX: FIB4 SCORE: 0.42

## 2024-02-26 NOTE — PROGRESS NOTES
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 decreased fatigue.  Blood counts obtained at the time demonstrated a WBC of 0.4, Hgb 8.8 and platelets 125.  ANC at the time was measured at only 170.  JULY was otherwise clinically well appearing.  He did receive a one-time normal saline bolus for his nausea and vomiting and was sent home with instructions to HOLD his oral mercaptopurine and oral methotrexate which began being held on 7/28/2023.  Per protocol, imatinib was continued at previous dose of 600 mg in the morning. Tomas Roy would return to clinic again on 8/2/2023 and 8/7/2023.  On both occasions, ANC remained under 500 and oral therapy (with the exception of imatinib) continue to be held while awaiting count recovery.  Ultimately, on 8/11/2023 after 14 days of therapy being held, Tomas Roy returned to clinic and WBC had increased to 2.1 with ANC increasing to 1500 with an absolute  monocyte count of 290. Tomas Roy was then instructed to resume his oral chemotherapy at 100% of prior dosing with (due to timing with Day 1 of Cycle 2 with LP 3 days later, no weekly MTX was administered).  Imatinib was never held.  On 8/14/2023 he was seen in clinic for Day 1 of Cycle 2 of Maintenance with lumbar puncture, vincristine, and 5-day pulse of steroids.  At the time, his ANC was 2010 and his oral chemotherapy was continued at 100% dosing.  Given his history of previously drop in counts, he was scheduled to come back for repeat labs on 8/29/2023 at which point his WBC count was 1.4 and his ANC remained greater than 500 at 1140.  Again, his therapy was continued with imatinib 550 mg by mouth in the morning as well as 100% dosing of methotrexate and 100% dosing of 6-mercaptopurine.  When Tomas Roy returned to clinic on 9/11/2023 for his Maintenance, Cycle 2, Day 29 therapy he was noted to have had another drop in his WBC to 600 and his ANC accordingly was also decreased at 410.  He did receive vincristine in accordance with protocol as well as starting a 5-day pulse of prednisone per protocol.  Given however that his ANC had dropped below 500 his oral methotrexate and oral 6-MP were again held.  He was instructed return to clinic 1 week later for lab evaluations.  On 9/19/2023 he returned to clinic and WBC had improved slightly to 0.8 however ANC remained low at 260 with an absolute monocyte count of 220.  Given that counts not recovered his oral chemotherapy remained held for an additional week.  On 9/26/2023 at 14 days after initially holding chemotherapy, he was seen back in clinic at which time WBC improved again to 1.1 however ANC did not quite recover and remained at 490 with an absolute monocyte count of 330.  Given that 2 weeks had elapsed with myelosuppression, imatinib was held in addition to oral 6-MP and methotrexate. Tomas Roy was instructed to return to clinic on 9/29/2023  for repeat counts.  On 9/29/2023 WBC had improved to 2.4 and ANC had finally recovered with 1530 and an absolute monocyte count of 510.  Given a recovery with ANC greater than 750 and platelets greater than 75, oral chemotherapy was restarted at 50% of initial dosing and imatinib was restarted at 100% of initial dosing.  On 10/9/2023, Tomas Roy returned to clinic for his Day 57 of Cycle 2 visit.  At the time of his visit, his ANC had recovered after holding chemotherapy and then restarting at 50% dosing 11 days prior.  He did however in clinic spiked a temperature 102.5 °F.  For this reason despite his ANC being improved, no dose adjustments were made in his chemotherapy.  He continued with 50% dosing with 100% adherence of his 6-MP and methotrexate as well as his imatinib at 550 mg PO QHS.   Tomas Roy was then seen in clinic again on 11/6/2023 for his Day 1 of Cycle 3 of Maintenance therapy.  At the time, his imatinib dose was adjusted back up to 600 mg daily taken in the morning.  Additionally, his methotrexate was adjusted back up to 75% of expected dosing and his mercaptopurine was increased to 75% of expected dosing as well.  He will return to clinic on 12/4/2023 for his Day 29 of Cycle 3 therapy.  At the time, his ANC was exactly 1500 and therefore no dose adjustments were made and he continued at 75% dosing for oral chemotherapy as well as the imatinib dose of 600 mg daily.  On 1/2/2024 he was seen for his Day 57 evaluations and his ANC had dropped to 1450 again not prompting any change in oral chemotherapy dosing.  Most recently on 1/29/2024, he was seen for his Day 1 of Cycle 4 of Maintenance.  He tolerated his lumbar puncture well without any issues.  All oral medications were adjusted for counts and for weight.  Today he presents for his Day 29 of Cycle 4 evaluations and chemotherapy.  Today, he presents for Cycle 4 of Maintenance, Day 1 evaluations.  Interval history is unremarkable.     No  acute events since last visit.  Afebrile without any recent or remote illness.  Energy and activity are at baseline.  Not complaining of any headaches, change in vision or neurologic status changes.  Continues to perform well at school.  No issues with learning.  No complaints of any cough, shortness of breath or difficulty with breathing.  No nausea, vomiting, diarrhea or constipation.  No abdominal pain or discomfort.  Not complaining of any skin changes or rashes.  No new aches or pains.  Peripheral neuropathy is stable to improved. Tomas Roy reports that he has been 100% adherent with his oral medications to include 10.5 tablets of methotrexate each week as well as mercaptopurine 2 tablets x 7 days each week and imatinib 600 mg by mouth every morning.  No other concerns or complaints at this time.    Review of Systems:     Constitutional:  Afebrile. No remote or acute illness.  Energy and activity are at baseline.    HENT: Negative for auditory changes, nosebleeds and sore throat.  No mouth sores.  Eyes: Negative for visual changes.  Respiratory: Negative for shortness of breath.  No cough.  Cardiovascular: Negative.  Gastrointestinal: Negative for nausea, vomiting, abdominal pain, diarrhea, constipation.  Genitourinary: Negative.  Musculoskeletal: Negative for joint or muscle pain.  Skin: Negative for rash, signs of infection.  Neurological: Negative for numbness, tingling, sensory changes, weakness.  Endo/Heme/Allergies: Does not bruise/bleed easily.    Psychiatric/Behavioral: No changes in mood, appropriate for age.     PAST MEDICAL HISTORY:     Oncologic History:  2-3 week history of worsening fatigue, right lower extremity pain  Presentation to OS and diagnosed with right LE superficial thrombus, subsegmental PE and hyperleukocytosis, anemia and thrombocytopenia  Transferred to [facility] for definitive care  Presenting (local) WBC > 440K, Hgb 10.0, platelets 53, (automated differential ANC 3190, ALC  75,310, absolute monocyte count 94876, absolute blast count 340,560)  Uric Acid 15.6, phosphorus 5.6, LDH 1114  Rasburicase x 1 dose given   Peripheral Blood flow cytometry demonstrating CD10 pos, CD19 pos, CD20 neg, CD22 dim (60%) 5/28/2022  Peripheral blood FISH for BCR-ABL1 positive in 98% of analyzed cells     Age at Diagnosis: 21 years  White Blood Cell Count at Presentation: > 440 k/uL  Testicular Disease Status: Negative (see procedure note 5/30/2022)  CNS Status: 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, diagnostic LP with WBC 1, RBC 3 and no evidence of leukemic blasts 5/30/2022  Steroid Pre-treatment: None  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     All inclusion/exclusion criteria for [id number] met and consent signed - enrolled 5/29/2022   All inclusion/exclusion criteria for [id number] met and consent signed - enrolled 5/30/2022  Confirmatory bone marrow aspirate and biopsy and diagnostic LP + cytarabine 70 mg IT 5/30/2022  Induction therapy (ON STUDY [id number]) started 5/30/2022  Bone marrow immunohistochemistry consistent with diagnosis of B-ALL comprising 90% of marrow cellularity  Bone marrow [facility] to [facility] for COG purposes:  Flow cytom  Of the blood pressure little high that is a problem is a cultural problem is well and cultural genetic and everything else like that unfortunately breathalyzers such bad heart disease diabetes things like that  populations etry consistent with peripheral blood, cytogenetics remarkable for known t(9;22)  CSF with WBC 1, RBC 3, no blasts identified on cytospin  FISH results available 5/31/2022 making patient eligible for transfer from [place] [address] to [place] [address] as eligibility requirements were met for [place] [address]  Patient unenrolled from [place] [address] (BCR-ABL1 fusion positive) 6/1/2022  Consent signed for [place] [address] and patient enrolled 6/1/2022 (see eligibility checklist from 5/31/2022 and  consent note from 6/1/2022)  Imatinib 400 mg PO QAM / 200 mg PO QPM started 6/3/2022 (allowed per [place] [id number])  Patient completed the first 15 days of a Standard 4-drug Induction on 6/13/2022  Start of [facility] [id number](OS), Induction IA Part 2, Day 15 6/13/2022  End of Induction 1A Part 2 - MRD negative  Start of [facility] [id number](OS), Induction IA Part 2, Day 15 7/5/2022  Induction IB DELAYED 2 weeks 14 days from 7/26/2022-8/9/2022) for myelosuppression - Start of Day 22 cytarabine block 8/9/2022  Induction IB Day 42 delayed 9 days for myelosuppression - Day 42 evaluations 9/7/2022  End of Induction IB - Flow cytometric MRD negative, MRD by IgH-TCR PCR [id number]%  Randomization to AR-Experimental Arm B of [id number]  Start of AR-Experimental Arm B, Interim Maintenance 9/29/2022  Weight based increase in dose of imatinib to 400 mg PO AM and 250 mg PM 9/29/2022  Thrombocytopenia not permissive of proceeding with Day 15 of Interim Maintenance  AR-Experimental Arm B, Interim Maintenance, Day 15 delayed 4 days, start 10/17/2022  AR-Experimental Arm B, Interim Maintenance, Day 29, start 11/1/2022  AR-Experimental Arm B, Interim Maintenance, Day 43, start 11/14/2022  Last does of 6-MP 11/27/2022  AR-Experimental Arm B, Delayed Intensification, Day 1, start 12/6/2022  Admission with Severe Septic Shock 12/27/2022  Imatinib HELD 12/27/2022-1/8/2023  AR-Experimental Arm B, Delayed Intensification, Day 29 DELAYED 14 days with start 1/17/2023  Hospitalization 2/7/2023 on Day 50 of Delayed Intensification with left shoulder pain ultimately diagnosed with Bacteroides fragilis infection  AR-Experimental Arm B, Interim Maintenance, Day 1 DELAYED 7 days with start 2/27/2023  AR-Experimental Arm B, Interim Maintenance, Day 11 DELAYED 4 days for platelets 43K on 3/9/2023  AR-Experimental Arm B, Interim Maintenance, Day 11 VCR given and MTX omitted for platelets 43K 3/13/2023  Imatinib HELD 3/30/2023-4/11/2023  AR-Experimental Arm B, Interim  "Maintenance, Day 21 (DELAYED 22 DAYS) - administered 4/11/2023 with methotrexate 80 mg/m² IV  AR-Experimental Arm B, Interim Maintenance, Day 31 (\"true\" Day 31 4/25/2023) - VCR and IT MTX given 4/28/2023 - IV MTX omitted  AR-Experimental Arm B, Interim Maintenance, Day 41 met with counts - administered 5/5/2023 with methotrexate 80 mg/m² IV     Start of Maintenance, Cycle 1, Day 1 -5/22/2023  Cranial radiation 6/5/2023-6/16/2023 (10 fractions)  Maintenance, Cycle 1, Day 29 - 6/23/2023 (no IT MTX given)  Maintenance, Cycle 1, Day 67, presented with fatigue nausea and vomiting found to have ANC less than 500  Methotrexate (oral) and mercaptopurine held 7/27/2023 - continued with imatinib  Methotrexate (oral) and mercaptopurine held 8/2/2023 - continued with imatinib  Methotrexate (oral) and mercaptopurine held 8/7/2023 - continued with imatinib  Restarted methotrexate (oral) and mercaptopurine at 100% previous dosing on 8/11/2023 - continued with imatinib  Maintenance, Cycle 2, Day 1 - 8/14/2023  Maintenance, Cycle 2, Day 29 - 9/11/2023  Methotrexate (oral) and mercaptopurine held 9/11/2023 - continued with imatinib  Methotrexate (oral) and mercaptopurine held 9/19/2023 - continued with imatinib  Methotrexate (oral) and mercaptopurine held 9/26/2023 - HELD imatinib  Methotrexate (oral) restarted at 50% dosing and mercaptopurine restarted at 50% dosing 9/29/2023 - RESTARTED imatinib  Maintenance, Cycle 2, Day 57 - 10/9/2023  Maintenance, Cycle 3, Day 1 - 11/6/2023: Methotrexate (oral) increased to 75% dosing and mercaptopurine increased to 75% dosing.  Imatinib increased to 600 mg QAM 11/6/2023  Maintenance Cycle 3, Day 29: 12/4/2023 No changes made to the dosing of oral chemotherapy  Maintenance, Cycle 4, Day 1: No dose adjustments made however ANC slightly greater than >1500.  Oral chemotherapy did not need weight adjustment.     Past Medical History:    1) Previously Healthy  2) Precursor B-Cell Lymphoblastic " Leukemia - BCR-ABL1 positive  3) Hyperleukocytosis  4) Hyperuricemia  5) Hyperphosphatemia  6) Right Lower Extremity Superficial Thrombus  7) Subsegmental Pulmonary Embolism  8) 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  9) Bacteroides fragilis soft tissue infection left shoulder  10) Anxiety/Depression     Past Surgical History:     1) Temporary Right IJ Pharesis Catheter Placement 5/28/2022  2) Right-sided Port-A-Cath placement 8/29/2022  3) IR drainage left calf hematoma  4) Left shoulder I&D  5) Cranial XRT 6/5/2023-6/16/2023     Birth/Developmental History:   1st of three children  Unremarkable pregnancy  Unremarkable delivery     Allergies:             Allergies as of 05/27/2022 - Reviewed 05/27/2022   Allergen Reaction Noted    Amoxicillin   04/03/2020      Social History:   No longer living at home with mother.  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   Two dogs. Father not involved.     Family History:     Family History             Family History   Problem Relation Age of Onset    No Known Problems Mother      Diabetes Paternal Grandfather      Hypertension Paternal Grandfather      Hyperlipidemia Paternal Grandfather      Cancer Neg Hx      Heart Disease Neg Hx      Stroke Neg Hx           No significant family history of cancer.  Both maternal and paternal family history of diabetes.     Immunizations:  UTD    Medications:   Current Outpatient Medications on File Prior to Encounter   Medication Sig Dispense Refill    methotrexate 2.5 MG tablet Take 10.5 Tablets by mouth every 7 days for 30 days. 42 Tablet 0    mercaptopurine (PURINETHOL) 50 MG Tab Take 2 tablets by mouth in the evening x 7 days of the week. 64 Tablet 5    predniSONE (DELTASONE) 10 MG Tab Take 3.5 tablets by mouth in the morning and evening for 5 Days at Day 1, 29 and 57 of each cycle. 35 Tablet 6    imatinib (GLEEVEC) 400 MG tablet Take 1.5 Tablets by mouth every morning for 30 days. Pt takes 200 mg + 400 mg for a total dose of 600 mg daily 45 Tablet 3    omeprazole  "(PRILOSEC) 20 MG delayed-release capsule Take 1 Capsule by mouth every day for 180 days. 30 Capsule 5    sulfamethoxazole-trimethoprim (BACTRIM DS) 800-160 MG tablet Take 2 Tablets by mouth twice daily two days a week. (Patient taking differently: Take 1 Tablet by mouth 2 times a day.) 48 Tablet 11    LORazepam (ATIVAN) 1 MG Tab Take 1 mg by mouth every four hours as needed for Anxiety.      Melatonin 5 MG Cap Take 5 mg by mouth at bedtime as needed. (Patient not taking: Reported on 1/29/2024)      ondansetron (ZOFRAN ODT) 8 MG TABLET DISPERSIBLE Dissolve 1 Tablet by mouth every 6 hours as needed for Nausea/Vomiting. 10 Tablet 0     No current facility-administered medications on file prior to encounter.     OBJECTIVE:     Vitals:   /73   Pulse 78   Temp 37.3 °C (99.1 °F) (Temporal)   Resp 20   Ht 1.65 m (5' 4.96\")   Wt 64.7 kg (142 lb 10.2 oz)   SpO2 98%     Labs:    Hospital Outpatient Visit on 02/26/2024   Component Date Value    WBC 02/26/2024 1.9 (LL)     RBC 02/26/2024 3.42 (L)     Hemoglobin 02/26/2024 11.9 (L)     Hematocrit 02/26/2024 34.8 (L)     MCV 02/26/2024 101.8 (H)     MCH 02/26/2024 34.8 (H)     MCHC 02/26/2024 34.2     RDW 02/26/2024 67.2 (H)     Platelet Count 02/26/2024 237     MPV 02/26/2024 9.5     Neutrophils-Polys 02/26/2024 69.50     Lymphocytes 02/26/2024 4.20 (L)     Monocytes 02/26/2024 13.60 (H)     Eosinophils 02/26/2024 9.30 (H)     Basophils 02/26/2024 2.50 (H)     Nucleated RBC 02/26/2024 0.00     Neutrophils (Absolute) 02/26/2024 1.32 (L)     Lymphs (Absolute) 02/26/2024 0.08 (L)     Monos (Absolute) 02/26/2024 0.26     Eos (Absolute) 02/26/2024 0.18     Baso (Absolute) 02/26/2024 0.05     NRBC (Absolute) 02/26/2024 0.00     Anisocytosis 02/26/2024 1+     Macrocytosis 02/26/2024 1+     Sodium 02/26/2024 140     Potassium 02/26/2024 3.7     Chloride 02/26/2024 106     Co2 02/26/2024 23     Anion Gap 02/26/2024 11.0     Glucose 02/26/2024 94     Bun 02/26/2024 7 (L)     " Creatinine 02/26/2024 0.63     Calcium 02/26/2024 8.4 (L)     Correct Calcium 02/26/2024 8.1 (L)     AST(SGOT) 02/26/2024 23     ALT(SGPT) 02/26/2024 36     Alkaline Phosphatase 02/26/2024 105 (H)     Total Bilirubin 02/26/2024 0.7     Albumin 02/26/2024 4.4     Total Protein 02/26/2024 6.3     Globulin 02/26/2024 1.9     A-G Ratio 02/26/2024 2.3     Direct Bilirubin 02/26/2024 <0.2     Indirect Bilirubin 02/26/2024 see below     GFR (CKD-EPI) 02/26/2024 138     Metamyelocytes 02/26/2024 0.90     Manual Diff Status 02/26/2024 PERFORMED     Peripheral Smear Review 02/26/2024 see below     Plt Estimation 02/26/2024 Normal     RBC Morphology 02/26/2024 Present     Poikilocytosis 02/26/2024 2+     Ovalocytes 02/26/2024 1+       Physical Exam:    Constitutional: Well-developed, well-nourished, and in no distress.    HENT: Normocephalic and atraumatic. No nasal congestion or rhinorrhea. Oropharynx is clear and moist. No oral ulcerations or sores.    Eyes: Conjunctivae are normal. Pupils are equal, round, and reactive to light.    Neck: Normal range of motion of neck, no adenopathy.    Cardiovascular: Normal rate, regular rhythm and normal heart sounds.  No murmur heard. DP/radial pulses 2+, cap refill < 2 sec  Pulmonary/Chest: Effort normal and breath sounds normal. No respiratory distress. Symmetric expansion.  No crackles or wheezes.  Abdomen: Soft. Bowel sounds are normal. No distension and no mass. There is no hepatosplenomegaly.    Genitourinary:  Deferred.  Musculoskeletal: Normal range of motion of lower and upper extremities bilaterally. No tenderness to palpation of elbows, wriwts, hands, knees, ankles and feet bilaterally.   Lymphadenopathy: No cervical adenopathy, axillary adenopathy or inguinal adenopathy.   Neurological: Alert and oriented to person and place. Exhibits normal muscle tone bilaterally in upper and lower extremities. Gait normal. Coordination normal.    Skin: Skin is warm, dry and pink.  No rash  or evidence of skin infection.  No pallor.   Psychiatric: Mood and affect normal for age.      ASSESSMENT AND PLAN:     Tomas Jean-Baptiste is a previously healthy 23 year old male with  Precursor B-Cell Lymphoblastic Leukemia with BCR-ABL1 fusion and whose End of Induction IB MRD was both negative by flow cytometry and PCR who presents for Maintenance, Cycle 4, Day 29     1) Ph+ Precursor B-Cell Acute Lymphoblastic Leukemia, in MRD Remission:  - 2-3 weeks of symptoms  - Presenting WBC > 440 k/uL, hyperleukocytosis  - Start of Hydroxyurea (1500 mg PO Q8) 2320 on 5/27/2022  - discontinued after only 55 hours  - No steroid pretreatment  - 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  - Testicular status NEGATIVE       - Flow cytometry of both 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, FISH positive for BCR-ABL1 translocation  - Enrolled on [facility] [id number]  - Initially enrolled on [facility] [id number] - but 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                            - Enrolled on [facility] [id number] and began study 6/13/2022              - Started imatinib therapy 6/3/2022   - End of Induction IA and IB MRD negative  - Imatinib dose increased to 400 mg PO AM and 250 mg PM 9/29/2022   -* Note:  All imatinib doses given inpatient rounded down to 600 mg   - Imatinib held for Septic Shock 12/27/2022-1/8/2023   - Imatinib 600 mg PO daily restarted 1/8/2023 inpatient   - Imatinib 400 mg PO QAM and 250 mg PO QHS 1/14/2023-1/17/2023   - Imatinib 600 mg PO QAM 1/17/2023 - 3/30/2023   - Imatinib 600 mg PO QAM 4/11/2023 - 8/13/2023   - Imatinib 550 mg PO QAM 8/14/2023 - 9/26/2023   - Imatinib 550 mg PO QAM 9/29/2023 -11/6/2023   - Imatinib 600 mg PO QAM 11/6/2023 - PRESENT (Updated for weight 1/29/2024)     - Imatinib dosing changed every 12 weeks for weight. Continue Imatinib at 600 mg PO QAM (1/29/2024)               - WBC 1.9, Hgb 11.9, platelets 237            - ANC 1320, ALC 80             - AST 23, ALT 36, total bilirubin 0.7, direct bilirubin <0.2                - ANC today is 1320.  As ANC has dropped back below 1500, will not make any dose adjustments.    [id number](OS), AR Arm B, Maintenance, Cycle 4, Day 29:   ** Continue Imatinib at 600 mg PO QAM  ** Vincristine 2 mg IV x 1 dose (TODAY)  ** Begin pulse of prednisone 35mg PO QAM and 35 mg PO QHS x 5 days           ** Continue oral MTX to 10.5 tablets PO weekly   ** Continue oral 6-mercaptopurine 2 tablets PO daily x 7 days      - Return to infusion center in 1 month for Cycle 4, Day 57     2) Chemotherapy Related Pancytopenia:           - WBC 1.9, Hgb 11.9, platelets 237            - ANC 1320, ALC 80  - Transfuse for hemoglobin less than 7 gm/dL or symptomatic  - Transfuse for platelets less than 10,000/microliters or symptomatic  - No transfusions necessary today     3) At Risk of Opportunistic Lung Infection:  - Bactrim DS PO BID Sat and Sun for PJP prophylaxis     4) Central Access:   - R Port-A-Cath in place      5) Research Participant: ON STUDY [id number], AR Arm B, Maintenance, Cycle 4, Day 29             [facility] - Source Data         Diagnosis: Ph+ Precursor B-Cell Acute Lymphoblastic Leukemia     Disease Status: EOI1A MRD NEGATIVE, EOI1B RD NEGATIVE, CNS3c, testicular negative, HSV1 IgG POSITIVE, CMV IgG NEGATIVE, VARICELLA IgG POSITIVE     Active Studies: [id number], [id number]                                                                                                      Inactive Studies: [id number]                                                                                                                                                Optional Studies: None             Protocol: International Phase 3 trial in [place] 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      Treatment Plan: [id number](OS), AR-Arm B,  Maintenance, Cycle 4, Day 29 (2/26/2024)     Height: 1.65 m     Weight: 65.1 kg     BSA: 1.73 m²   (Maintenance, Cycle 4,   Day 1, 1/29/2024: Height and weight updated per protocol)                                                                                                                                           Performance Status: Karnofsky 90, ECOG 1 (1/29/2024)     Treatment Plan Medications:  (100% adherent)     Continue oral 6- mercaptopurine 2 tabs x 7 days   Continue oral MTX to 10.5 tabs weekly   Continue Imatinib to 600 mg PO QHS      Evaluations / Study Labs:  (1/29/2024)     WBC 1.9, Hgb 11.9, platelets 237  ANC 1320, ALC 80     AST 23, ALT 36, total bilirubin 0.7, direct bilirubin <0.2    Therapy Given: (2/26/2024)     Oral chemotherapy as above  Vincristine 2 mg IV x 1  Start 5 days pulse of oral prednisone     Maintenance, Cycle 4 Toxicities:     None         Disposition: Return to infusion center in 1 month for Day 57 of Cycle 4 of Maintenance     Pepe Faye MD  Pediatric Hematology / Oncology  [facility]  Cell.  [phone]  [facility]. [phone]

## 2024-02-26 NOTE — PROGRESS NOTES
Pt to [facility] for lab draw, doctors office visit, and chemotherapy administration.      Afebrile.  VSS.  Awake and alert in no acute distress.      Port accessed using a 20g 1 inch trevino needle with 1 attempt.  Labs drawn from the port without difficulty.   Pt tolerated well.      Chemotherapy dosage calculated independently by Huong Foster, MONTSERRAT and Tammie Covington RN  and compared to road map for protocol [id number].  Calculations within 10% of written order.  Lab results reviewed.      Premedications and chemo given as ordered, see MAR.  Blood return verified prior to, during, and after chemotherapy infusion.  See Chemotherapy flowsheet.  PT tolerated well.  No side effects or complications noted.  Port flushed per orders (see MAR) and de-accessed after completion.  Will return for next visit on 03/25/24.

## 2024-02-26 NOTE — ADDENDUM NOTE
Encounter addended by: Pepe Faye M.D. on: 2/26/2024 11:13 AM   Actions taken: Order Reconciliation Section accessed, Clinical Note Signed

## 2024-02-26 NOTE — PROGRESS NOTES
"Pharmacy Chemotherapy Verification    Patient Name: Tomas Jean-Baptiste  Dx: pH+ B-Cell ALL         Protocol: Maintenance C3 and subsequent cycles(On Study Arm B, ID number: [id number])         Allergies: Amoxicillin       /77   Pulse 85   Temp 36.9 °C (98.4 °F) (Temporal)   Resp 20   Ht 1.65 m (5' 4.96\")   Wt 64.7 kg (142 lb 10.2 oz)   SpO2 99%   BMI 23.76 kg/m²    Body surface area is 1.72 meters squared.    All lab results 2/26/24 within treatment parameters.     Drug Order   (Drug name, dose, route, IV Fluid & volume, frequency, number of doses) Maintenance, Cycle 4, Day 29  Previous treatment: Maintenance, C3D57 1/2/24   Medication = Vincristine (ONCOVIN)   Base Dose= 1.5 mg/m2  Calc Dose: Base Dose x 1.72m2 = >2mg  Final Dose = 2 mg (MAX)   Route = IV  Fluid & Volume = NS 25 mL  Admin Duration = Over 5 - 10 minutes    Days 1, 29, 57      <10% difference, okay to treat with final max dose   Medication = Methotrexate PF  Base Dose = 12 mg fixed dose  Calc Dose: n/a  Final Dose = ---mg   Route = INTRATHECAL  Fluid & Volume = pf NS 6 mL  Admin Duration = IT per MD Day 1   No calculation required.   okay to treat with final dose   By my signature below, I confirm this process was performed independently with the BSA and all final chemotherapy dosing calculations congruent. I have reviewed the above chemotherapy order and that my calculation of the final dose and BSA (when applicable) corroborate those calculations of the  pharmacist. Discrepancies of 10% or greater in the written dose have been addressed and documented within the [facility] Progress notes.    Galen Wilson, PharmD  "

## 2024-02-26 NOTE — PROGRESS NOTES
"Pharmacy Chemotherapy Calculations Note:    Dx: B-ALL (CNS3)  Cycle: 4 Maintenance Day 29   Previous treatment: Maint C4D1 on 1/29/24     Protocol: Maintenance per Arm B of [id number] [facility] [place]: [id number]      /77   Pulse 85   Temp 36.9 °C (98.4 °F) (Temporal)   Resp 20   Ht 1.65 m (5' 4.96\")   Wt 64.7 kg (142 lb 10.2 oz)   SpO2 99%   BMI 23.76 kg/m²  Body surface area is 1.72 meters squared.    Labs from 2/26/24 reviewed - all within treatment parameters.       Vincristine 1.5 mg/m² (max 2 mg) x 1.72 m² = 2.58 mg   ok for max final dose = 2 mg IV      Judy Bryant, PharmD, BCOP                "

## 2024-02-26 NOTE — ADDENDUM NOTE
Encounter addended by: Pepe Faye M.D. on: 2/26/2024 11:23 AM   Actions taken: Clinical Note Signed, Level of Service modified

## 2024-03-04 ENCOUNTER — TELEMEDICINE (OUTPATIENT)
Dept: PSYCHOLOGY | Facility: MEDICAL CENTER | Age: 24
End: 2024-03-04
Attending: PSYCHOLOGIST
Payer: MEDICAID

## 2024-03-04 PROCEDURE — 99443 PR PHYSICIAN TELEPHONE EVALUATION 21-30 MIN: CPT | Performed by: PSYCHOLOGIST

## 2024-03-05 NOTE — PROGRESS NOTES
PEDIATRIC BEHAVIORAL HEALTH VISIT    ADULT REQUEST FOR CONFIDENTIALITY    Name:  Tomas Jean-Baptiste  MRN:  [id number]  :  2000  Age:  23 y.o.  Referring Provider: Dr. Faye (Hem/Onc)  Pediatrician:  Margarito Arvizu M.D.  Date of Service:  3/04/2024    Discussed with patient: You have chosen to receive care through the use of secure virtual/telemedicine. This platform enables health care providers at different locations to provide safe, effective, and convenient care through the use of technology. As with any health care service, there are risks associated with the use of this platform, including equipment failure, poor image resolution or no image, and  issues. You also understand that I cannot physically examine you and that you may need to come to clinic to complete the assessment.    Patient verbally asserts understanding of the risks and benefits of telemedicine as explained. Endorses all questions answered regarding telemedicine.     I conducted this encounter from [facility] via secure, live, telephone or audio/video conference with the patient. Patient was located in [place], [place]. Prior to the interview, the risks and benefits of telemedicine were discussed with the patient and verbal consent was obtained.     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 See intake note dated 23    Mental Status Exam:   General description In no apparent distress, well-groomed, appropriately attired, well-nourished, and cooperative with interview  Interactional style Culturally appropriate  Eye contact Normal and appropriate for culture  Speech  Unimpaired, fluid and clear, normal rate and rythem  Motor activity Normal motor activity  Orientation Oriented to person time, place and situation  Intellectual functioning Unimpaired  Memory Unimpaired  Attention and concentration Intact and normative concentration  Fund of knowledge Average  Mood Generally euthymic  Affect Appropriate  Perceptual Disturbances None apparent  Thought Process  No abnormalities apparent       Associations Unimpaired associations       Abstractions Normal abstractions, intact       Insight Insight - adequate and normative       Judgment Judgments - intact and normative   Thought Content  No apparent delusions    Risk Assessment:  Tomas Roy denied current concerns regarding risk to self or others.       Issues Discussed:   This provider met with JULY to continue assisting in rediscovering himself after the trauma of his cancer diagnosis and treatment as well as how childhood impacts this. Today the session was spent continuing to process the situation with his mother and his view of things. JULY wanting to process his reaction to what occurred and what his mother has said about him. He questioned whether what she said was true and we processed her reaction and the seeming attempts to make him look bad. Time was spent discussing how to continue with his life while keeping the door open to family if things change. Processed the importance of focusing on what he can control in this situation and what he cannot and also the struggle living in the unknown.     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 After meeting with JULY  during his intake, it appears he could benefit from learning anxiety management skills, processing what he has been through, and how to live the life he wants. Tomas Jean-Baptiste could benefit from learning appropriate ways of expressing and coping with his emotions. He could also benefit from learning Cognitive Behavioral Therapy (CBT) and Acceptance and Commitment Therapy (ACT) tools to understand the interaction of thoughts, feelings, and actions. As well as Trauma Focused-CBT to process his cancer journey. Tomas Lees Jean-Baptiste agreed with the plan.       PLAN  JULY will learn and utilize appropriate ways to express and cope with emotions.    He will learn the connection between thoughts, feelings, and actions utilizing CBT and ACT tools.,   Processed the thoughts that arise around his mother and her actions.  JULY will process how their medical diagnosis is impacting their life.    Next visit we will process the picture he created and ways to continue moving forward.    The above diagnostic impressions, recommendations, and treatment plan were discussed with and agreed upon by Tomas Roy, and his caregivers. Care will be coordinated with Tomas Roy's healthcare team, as appropriate.    Total time spent on encounter was 60 minutes.    Laurie Ortega, PhD  Pediatric Psychologist   Licensed Psychologist, [place] # [id number]  [facility], Behavioral Health

## 2024-03-06 ENCOUNTER — TELEPHONE (OUTPATIENT)
Dept: PEDIATRIC HEMATOLOGY/ONCOLOGY | Facility: MEDICAL CENTER | Age: 24
End: 2024-03-06
Payer: MEDICAID

## 2024-03-06 DIAGNOSIS — Z51.11 ENCOUNTER FOR CHEMOTHERAPY MANAGEMENT: ICD-10-CM

## 2024-03-06 PROCEDURE — RXMED WILLOW AMBULATORY MEDICATION CHARGE: Performed by: PEDIATRICS

## 2024-03-06 RX ORDER — METHOTREXATE 2.5 MG/1
26.25 TABLET ORAL
Qty: 42 TABLET | Refills: 0 | Status: SHIPPED | OUTPATIENT
Start: 2024-03-06 | End: 2024-03-29 | Stop reason: SDUPTHER

## 2024-03-06 NOTE — TELEPHONE ENCOUNTER
Contact:  Phone number: [phone] (mobile).    Name of person spoken with and relationship to patient: JULY Jean-Baptiste (self)   Patient’s Adherence:  How patient is doing on medication: Well    How many missed doses and reason: 0 N/A    Any new medications: No    Any new conditions: No    Any new allergies: No    Any new side effects: No    Any new diagnoses: No    How many doses remaining: a week supply     Did patient want to speak with pharmacist: No   Delivery:  Delivery date and method: 3/8/24 via FedEx    Needs by Date: 3/8/24    Signature required: No     Any additional details for : N/A   Teach Appointment Date:  N/A   Shipping Address:  [address] [place] [address].   [place] [address]   Medication(name,strength and dose):  predniSONE (DELTASONE) 10 MG Tab, sulfamethoxazole-trimethoprim (BACTRIM DS) 800-160 MG tablet, and Imatinib 400mg tablet   Copay:  $0   Payment Method:  n/a $0 copay   Supplies:  NA   Additional Information:  NA     Refill request has been sent to provider for Methotrexate 2.5mg tablets    Josh Benito [facility]   Pharmacy Liaison  [phone]  3/6/2024 10:01 AM

## 2024-03-06 NOTE — TELEPHONE ENCOUNTER
URGENT Prior Authorization for mercaptopurine (PURINETHOL) 50 MG Tab  (Quantity: 60, Days: 30) has been submitted via Cover My Meds: Key ([id number])    Insurance: RX Lon    Will follow up in 24-72 hours     Josh Benito [facility]   Pharmacy Liaison  [phone]  3/6/2024 1:46 PM

## 2024-03-06 NOTE — TELEPHONE ENCOUNTER
Received Renewal request via MSOT  for mercaptopurine (PURINETHOL) 50 MG Tab . (Quantity:60, Day Supply:30)     Insurance: RX Lon  Member ID:  [id number]  BIN: [id number]  PCN: [id number]  [id number]     Ran Test claim via [place] & medication Rejects stating prior authorization is required.    Josh Benito [facility]   Pharmacy Liaison  [phone]  3/6/2024 1:27 PM

## 2024-03-07 ENCOUNTER — TELEPHONE (OUTPATIENT)
Dept: PEDIATRIC HEMATOLOGY/ONCOLOGY | Facility: MEDICAL CENTER | Age: 24
End: 2024-03-07
Payer: MEDICAID

## 2024-03-07 PROCEDURE — RXMED WILLOW AMBULATORY MEDICATION CHARGE: Performed by: PEDIATRICS

## 2024-03-07 NOTE — TELEPHONE ENCOUNTER
Received Renewal request via MSOT  for methotrexate 2.5 MG tablet . (Quantity:42, Day Supply:28)     Insurance: RX Lon  Member ID:  [id number]  BIN: [id number]  PCN: [id number]  [id number]     Ran Test claim via [place] & medication Rejects stating prior authorization is required.    Josh Benito [facility]   Pharmacy Liaison  [phone]  3/7/2024 8:21 AM

## 2024-03-07 NOTE — TELEPHONE ENCOUNTER
PA for mercaptopurine (PURINETHOL) 50 MG Tab  has been approved for a quantity of 60 , day supply 30    PA reference number: [id number]  Insurance: RX Lon  Effective dates: 3/6/24 to 3/6/25  Copay: $0     Is patient eligible to fill with [facility]? Yes    Patient already fills with [facility]    We will call to coordinate delivery    Josh Benito [facility]   Pharmacy Liaison  [phone]  3/7/2024 8:15 AM

## 2024-03-07 NOTE — TELEPHONE ENCOUNTER
URGENT Prior Authorization for methotrexate 2.5 MG tablet   (Quantity: 42, Days: 28) has been submitted via Cover My Meds: Key ([id number])    Insurance: RX Lon    Will follow up in 24-72 hours     Josh Benito [facility]   Pharmacy Liaison  [phone]  3/7/2024 8:40 AM

## 2024-03-07 NOTE — TELEPHONE ENCOUNTER
PA for methotrexate 2.5 MG tablet   has been approved for a quantity of 42 , day supply 28    PA reference number: [id number]  Insurance: RX Lon  Effective dates: 3/7/24 to 3/7/25  Copay: $0    See approval letter scanned to media     Is patient eligible to fill with [facility]? Yes    Will reach out to patient to coordinate delivery    Josh Benito [facility]   Pharmacy Liaison  [phone]  3/7/2024 2:10 PM

## 2024-03-08 ENCOUNTER — PHARMACY VISIT (OUTPATIENT)
Dept: PHARMACY | Facility: MEDICAL CENTER | Age: 24
End: 2024-03-08
Payer: COMMERCIAL

## 2024-03-11 ENCOUNTER — TELEPHONE (OUTPATIENT)
Dept: PHARMACY | Facility: MEDICAL CENTER | Age: 24
End: 2024-03-11
Payer: MEDICAID

## 2024-03-12 ENCOUNTER — PHARMACY VISIT (OUTPATIENT)
Dept: PHARMACY | Facility: MEDICAL CENTER | Age: 24
End: 2024-03-12
Payer: COMMERCIAL

## 2024-03-18 ENCOUNTER — TELEMEDICINE (OUTPATIENT)
Dept: PSYCHOLOGY | Facility: MEDICAL CENTER | Age: 24
End: 2024-03-18
Attending: PSYCHOLOGIST
Payer: MEDICAID

## 2024-03-18 DIAGNOSIS — C91.Z0 B LYMPHOBLASTIC LEUKEMIA WITH T(9;22)(Q34;Q11.2);BCR-ABL1 (HCC): ICD-10-CM

## 2024-03-18 PROCEDURE — 96159 HLTH BHV IVNTJ INDIV EA ADDL: CPT | Performed by: PSYCHOLOGIST

## 2024-03-18 PROCEDURE — 96158 HLTH BHV IVNTJ INDIV 1ST 30: CPT | Performed by: PSYCHOLOGIST

## 2024-03-19 NOTE — PROGRESS NOTES
PEDIATRIC BEHAVIORAL HEALTH VISIT    ADULT REQUEST FOR CONFIDENTIALITY    Name:  Tomas Jean-Baptiste  MRN:  [id number]  :  2000  Age:  23 y.o.  Referring Provider: Dr. Faye (Hem/Onc)  Pediatrician:  Margarito Arvizu M.D.  Date of Service:  3/18/2024    Discussed with patient: You have chosen to receive care through the use of secure virtual/telemedicine. This platform enables health care providers at different locations to provide safe, effective, and convenient care through the use of technology. As with any health care service, there are risks associated with the use of this platform, including equipment failure, poor image resolution or no image, and  issues. You also understand that I cannot physically examine you and that you may need to come to clinic to complete the assessment.    Patient verbally asserts understanding of the risks and benefits of telemedicine as explained. Endorses all questions answered regarding telemedicine.     I conducted this encounter from [facility] via secure, live, telephone or audio/video conference with the patient. Patient was located in [place], [place]. Prior to the interview, the risks and benefits of telemedicine were discussed with the patient and verbal consent was obtained.     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 See intake note dated 23    Mental Status Exam:   General description In no apparent distress, well-groomed, appropriately attired, well-nourished, and cooperative with interview  Interactional style Culturally appropriate  Eye contact Normal and appropriate for culture  Speech  Unimpaired, fluid and clear, normal rate and rythem  Motor activity Normal motor activity  Orientation Oriented to person time, place and situation  Intellectual functioning Unimpaired  Memory Unimpaired  Attention and concentration Intact and normative concentration  Fund of knowledge Average  Mood Generally euthymic  Affect Appropriate  Perceptual Disturbances None apparent  Thought Process  No abnormalities apparent       Associations Unimpaired associations       Abstractions Normal abstractions, intact       Insight Insight - adequate and normative       Judgment Judgments - intact and normative   Thought Content  No apparent delusions    Risk Assessment:  Tomas Roy denied current concerns regarding risk to self or others.       Issues Discussed:   This provider met with JULY to continue assisting in rediscovering himself after the trauma of his cancer diagnosis and treatment as well as how childhood impacts this. Today the session was spent continuing to process the situation with his mother and his view of things. We discussed his feelings about his siblings and ways to support from afar. Time was also spent discussing his focus on his life/stability as he figures out his independence. We also processed noticing when he feels pulled to succeed to show his family what he can do vs succeeding for himself.    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 After meeting with JULY during his intake, it appears he could benefit from learning anxiety management skills, processing what he has been through, and how to live the life he wants. Tomas  Kirill Lees Jean-Baptiste could benefit from learning appropriate ways of expressing and coping with his emotions. He could also benefit from learning Cognitive Behavioral Therapy (CBT) and Acceptance and Commitment Therapy (ACT) tools to understand the interaction of thoughts, feelings, and actions. As well as Trauma Focused-CBT to process his cancer journey. Tomas Roman Catholicron Lees Jean-Baptiste agreed with the plan.       PLAN  JULY will learn and utilize appropriate ways to express and cope with emotions.    He will learn the connection between thoughts, feelings, and actions utilizing CBT and ACT tools.,   Processed the thoughts that arise around his mother and her actions.  JULY will process how their medical diagnosis is impacting their life.    Next visit we will process the picture he created and ways to continue moving forward.    The above diagnostic impressions, recommendations, and treatment plan were discussed with and agreed upon by Tomas Roy, and his caregivers. Care will be coordinated with Tomas Roy's healthcare team, as appropriate.    Total time spent on encounter was 56 minutes.    Laurie Ortega, PhD  Pediatric Psychologist   Licensed Psychologist, [place] # [id number]  [facility], Behavioral Health

## 2024-03-22 ENCOUNTER — DOCUMENTATION (OUTPATIENT)
Dept: PHARMACY | Facility: MEDICAL CENTER | Age: 24
End: 2024-03-22
Payer: MEDICAID

## 2024-03-22 NOTE — PROGRESS NOTES
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     - Administration: takes 400mg tab x 1.5 tabs for 600mg dose around 7PM with food and water        Adherence: no missed doses reported    - Missed dose mgmt: none       Current SE: none    - Mitigation/mgmt: none       List Changes to Allergies, Diagnoses: none       Current S/Sx: said he throws up ocassionally due to chemo, doesn't think it's from the Imatinib, has zofran as needed and advised to let oncologist know if worsens    Clinically Relevant, Abnormal Labs: 2/26/24    - CBC: WBC: low 1.9, RBC: low 3.42, Hgb: low 11.9, Hct: low 34.8, ANC: low 1.32    - CHEM 7: BUN: low 7     - Cr: 0.63, GFR: 138    - LFTs/TBili: ALP: high 105    - BP: 109/73 ( 2/26/24)    - Cancer Specific Biomarkers:  cytology CSF, no malignancy identified, no blasts identified (1/29/24)       Wellness/Lifestyle Counseling:    - Support/QOL: doing well overall, said everything is pretty normal, not missing anything, energy and appetite have been fine    - Immunizations: updated per patient, no live vaccines, advised to check with oncology team if due for any vaccines to confirm timing        Med Rec/Updated drug list:    EMR accurate, no medication changes. Reviewed with patient.    Current Meds:    - Imatinib   - MTX   - Mercaptopurine   - Prednisone   - Omeprazole   - Bactrim   - Zofran, lorazepam, and melatonin as needed      DI Check:   - Cat D: Bactrim and Omeprazole may enhance adverse effects of MTX , monitor for MTX side effects, hasn't had issues, established        Goals of Therapy:   - Reduce progression of disease (POD) and/or improve quality of life - no notes of progression and said everything is pretty normal- QOL maintained   - Mitigation of side effects- no Imatinib side effects reported   - Promote optimal medication administration and adherence-  appropriate administration and adherent    - Improve overall survival (OS) and progression free survival (PFS)- oncologist following    - Reduce tumor biomarkers- last CSF cytology- no malignancy identified    Patient has agreed/understands to goals of therapy during education/counseling      Additional:   Had a pleasant check in with Tomas to see how he's doing on his Imatinib. He continues to tolerate well, no side effects reported. Said he does throw up once in a while but thinks this is due to weeks of chemo, has zofran as needed. Confirmed he's taking 600mg (400mg tab x1.5) around 7PM with food and water. Demonstrates adherence with no missed doses reported. Energy and appetite fine, said everything is pretty normal. No questions at this time. He was appreciative of check in.

## 2024-03-25 ENCOUNTER — HOSPITAL ENCOUNTER (OUTPATIENT)
Dept: INFUSION CENTER | Facility: MEDICAL CENTER | Age: 24
End: 2024-03-25
Attending: PEDIATRICS
Payer: MEDICAID

## 2024-03-25 VITALS
TEMPERATURE: 98.9 F | DIASTOLIC BLOOD PRESSURE: 92 MMHG | RESPIRATION RATE: 20 BRPM | HEART RATE: 90 BPM | OXYGEN SATURATION: 99 % | HEIGHT: 65 IN | SYSTOLIC BLOOD PRESSURE: 126 MMHG | BODY MASS INDEX: 24.35 KG/M2 | WEIGHT: 146.16 LBS

## 2024-03-25 DIAGNOSIS — Z51.11 ENCOUNTER FOR CHEMOTHERAPY MANAGEMENT: ICD-10-CM

## 2024-03-25 DIAGNOSIS — C91.01 ACUTE LYMPHOBLASTIC LEUKEMIA (ALL) IN REMISSION (HCC): ICD-10-CM

## 2024-03-25 DIAGNOSIS — C91.Z0 B LYMPHOBLASTIC LEUKEMIA WITH T(9;22)(Q34;Q11.2);BCR-ABL1 (HCC): ICD-10-CM

## 2024-03-25 LAB
ALBUMIN SERPL BCP-MCNC: 4.4 G/DL (ref 3.2–4.9)
ALBUMIN/GLOB SERPL: 2.2 G/DL
ALP SERPL-CCNC: 88 U/L (ref 30–99)
ALT SERPL-CCNC: 20 U/L (ref 2–50)
ANION GAP SERPL CALC-SCNC: 14 MMOL/L (ref 7–16)
AST SERPL-CCNC: 23 U/L (ref 12–45)
BASOPHILS # BLD AUTO: 0.9 % (ref 0–1.8)
BASOPHILS # BLD: 0.02 K/UL (ref 0–0.12)
BILIRUB CONJ SERPL-MCNC: <0.2 MG/DL (ref 0.1–0.5)
BILIRUB INDIRECT SERPL-MCNC: NORMAL MG/DL (ref 0–1)
BILIRUB SERPL-MCNC: 0.3 MG/DL (ref 0.1–1.5)
BUN SERPL-MCNC: 9 MG/DL (ref 8–22)
CALCIUM ALBUM COR SERPL-MCNC: 8.3 MG/DL (ref 8.5–10.5)
CALCIUM SERPL-MCNC: 8.6 MG/DL (ref 8.5–10.5)
CHLORIDE SERPL-SCNC: 105 MMOL/L (ref 96–112)
CO2 SERPL-SCNC: 20 MMOL/L (ref 20–33)
CREAT SERPL-MCNC: 0.6 MG/DL (ref 0.5–1.4)
EOSINOPHIL # BLD AUTO: 0.12 K/UL (ref 0–0.51)
EOSINOPHIL NFR BLD: 5.2 % (ref 0–6.9)
ERYTHROCYTE [DISTWIDTH] IN BLOOD BY AUTOMATED COUNT: 62.7 FL (ref 35.9–50)
GFR SERPLBLD CREATININE-BSD FMLA CKD-EPI: 140 ML/MIN/1.73 M 2
GLOBULIN SER CALC-MCNC: 2 G/DL (ref 1.9–3.5)
GLUCOSE SERPL-MCNC: 83 MG/DL (ref 65–99)
HCT VFR BLD AUTO: 39 % (ref 42–52)
HGB BLD-MCNC: 12.7 G/DL (ref 14–18)
IMM GRANULOCYTES # BLD AUTO: 0.04 K/UL (ref 0–0.11)
IMM GRANULOCYTES NFR BLD AUTO: 1.7 % (ref 0–0.9)
LYMPHOCYTES # BLD AUTO: 0.34 K/UL (ref 1–4.8)
LYMPHOCYTES NFR BLD: 14.8 % (ref 22–41)
MCH RBC QN AUTO: 34 PG (ref 27–33)
MCHC RBC AUTO-ENTMCNC: 32.6 G/DL (ref 32.3–36.5)
MCV RBC AUTO: 104.3 FL (ref 81.4–97.8)
MONOCYTES # BLD AUTO: 0.53 K/UL (ref 0–0.85)
MONOCYTES NFR BLD AUTO: 23 % (ref 0–13.4)
NEUTROPHILS # BLD AUTO: 1.25 K/UL (ref 1.82–7.42)
NEUTROPHILS NFR BLD: 54.4 % (ref 44–72)
NRBC # BLD AUTO: 0 K/UL
NRBC BLD-RTO: 0 /100 WBC (ref 0–0.2)
PLATELET # BLD AUTO: 255 K/UL (ref 164–446)
PMV BLD AUTO: 9.5 FL (ref 9–12.9)
POTASSIUM SERPL-SCNC: 3.7 MMOL/L (ref 3.6–5.5)
PROT SERPL-MCNC: 6.4 G/DL (ref 6–8.2)
RBC # BLD AUTO: 3.74 M/UL (ref 4.7–6.1)
SODIUM SERPL-SCNC: 139 MMOL/L (ref 135–145)
WBC # BLD AUTO: 2.3 K/UL (ref 4.8–10.8)

## 2024-03-25 PROCEDURE — 85025 COMPLETE CBC W/AUTO DIFF WBC: CPT

## 2024-03-25 PROCEDURE — A4212 NON CORING NEEDLE OR STYLET: HCPCS

## 2024-03-25 PROCEDURE — 82248 BILIRUBIN DIRECT: CPT

## 2024-03-25 PROCEDURE — 700111 HCHG RX REV CODE 636 W/ 250 OVERRIDE (IP): Mod: JZ,UD | Performed by: PEDIATRICS

## 2024-03-25 PROCEDURE — 700105 HCHG RX REV CODE 258: Mod: UD | Performed by: PEDIATRICS

## 2024-03-25 PROCEDURE — 96375 TX/PRO/DX INJ NEW DRUG ADDON: CPT

## 2024-03-25 PROCEDURE — 80053 COMPREHEN METABOLIC PANEL: CPT

## 2024-03-25 PROCEDURE — 96409 CHEMO IV PUSH SNGL DRUG: CPT

## 2024-03-25 PROCEDURE — 36591 DRAW BLOOD OFF VENOUS DEVICE: CPT

## 2024-03-25 RX ORDER — HEPARIN SODIUM,PORCINE 10 UNIT/ML
30 VIAL (ML) INTRAVENOUS PRN
Start: 2024-03-25

## 2024-03-25 RX ORDER — 0.9 % SODIUM CHLORIDE 0.9 %
20 VIAL (ML) INJECTION PRN
OUTPATIENT
Start: 2024-03-25

## 2024-03-25 RX ORDER — ONDANSETRON 2 MG/ML
8 INJECTION INTRAMUSCULAR; INTRAVENOUS ONCE
Status: COMPLETED | OUTPATIENT
Start: 2024-03-25 | End: 2024-03-25

## 2024-03-25 RX ADMIN — ONDANSETRON 8 MG: 2 INJECTION INTRAMUSCULAR; INTRAVENOUS at 12:47

## 2024-03-25 RX ADMIN — HEPARIN 500 UNITS: 100 SYRINGE at 12:57

## 2024-03-25 RX ADMIN — VINCRISTINE SULFATE 2 MG: 1 INJECTION, SOLUTION INTRAVENOUS at 12:50

## 2024-03-25 ASSESSMENT — FIBROSIS 4 INDEX: FIB4 SCORE: 0.36

## 2024-03-25 NOTE — PROGRESS NOTES
Pt to [facility] for lab draw, doctors office visit, and chemotherapy administration.      Afebrile.  VSS.  Awake and alert in no acute distress.      Port accessed using a 22g 3/4 inch trevino needle with 1 attempt.  Labs drawn from the port without difficulty.   Pt tolerated well.      Office visit completed with Dr Faye.  OK to proceed with chemo.  Chemotherapy dosage calculated independently by Huong Foster RN and Yue Poole RN and compared to road map for protocol [id number].  Calculations within 10% of written order.  Lab results reviewed.      Premedications and chemo given as ordered, see MAR.  Blood return verified prior to, during, and after chemotherapy infusion.  See Chemotherapy flowsheet.  PT tolerated well.  No side effects or complications noted.  Port flushed per orders (see MAR) and de-accessed after completion.  Will return for next visit on 04/22/24.

## 2024-03-25 NOTE — PROGRESS NOTES
"Pharmacy Chemotherapy Calculations Note:    Dx: B-ALL (CNS3)  Cycle: 4 Maintenance Day 57   Previous treatment: Maint C4D29 on 2/26/24     Protocol: Maintenance per Arm B of [id number] [facility] ID number: [id number]      BP (!) 126/92   Pulse 90   Temp 37.2 °C (98.9 °F) (Temporal)   Resp 20   Ht 1.648 m (5' 4.88\")   Wt 66.3 kg (146 lb 2.6 oz)   SpO2 99%   BMI 24.41 kg/m²  Body surface area is 1.74 meters squared.    Labs from 3/25/24 reviewed - all within treatment parameters.       Vincristine 1.5 mg/m² (max 2 mg) x 1.74 m² = 2.61 mg   ok for max final dose = 2 mg IV      Judy Bryant, PharmD, BCOP                "

## 2024-03-25 NOTE — PROGRESS NOTES
"Pharmacy Chemotherapy Verification    Patient Name: Tomas Jean-Baptiste  Dx: pH+ B-Cell ALL         Protocol: Maintenance C3 and subsequent cycles(On Study Arm B, ID number: [id number])         Allergies: Amoxicillin       BP (!) 126/92   Pulse 90   Temp 37.2 °C (98.9 °F) (Temporal)   Resp 20   Ht 1.648 m (5' 4.88\")   Wt 66.3 kg (146 lb 2.6 oz)   SpO2 99%   BMI 24.41 kg/m²    Body surface area is 1.74 meters squared.    All lab results 3/25/24 within treatment parameters.     Drug Order   (Drug name, dose, route, IV Fluid & volume, frequency, number of doses) Maintenance, Cycle 4, Day 57  Previous treatment: Maintenance, C4D29 2/26/24   Medication = Vincristine (ONCOVIN)   Base Dose= 1.5 mg/m2  Calc Dose: Base Dose x 1.72m2 = >2mg  Final Dose = 2 mg (MAX)   Route = IV  Fluid & Volume = NS 25 mL  Admin Duration = Over 5 - 10 minutes    Days 1, 29, 57      <10% difference, okay to treat with final max dose   Medication = Methotrexate PF  Base Dose = 12 mg fixed dose  Calc Dose: n/a  Final Dose = ---mg   Route = INTRATHECAL  Fluid & Volume = pf NS 6 mL  Admin Duration = IT per MD Day 1   No calculation required.   okay to treat with final dose   By my signature below, I confirm this process was performed independently with the BSA and all final chemotherapy dosing calculations congruent. I have reviewed the above chemotherapy order and that my calculation of the final dose and BSA (when applicable) corroborate those calculations of the  pharmacist. Discrepancies of 10% or greater in the written dose have been addressed and documented within the [facility] Progress notes.    Xochilt AlonzoD  "

## 2024-03-26 ENCOUNTER — TELEPHONE (OUTPATIENT)
Dept: INFUSION CENTER | Facility: MEDICAL CENTER | Age: 24
End: 2024-03-26
Payer: MEDICAID

## 2024-03-26 NOTE — TELEPHONE ENCOUNTER
Call placed to patient. Ensured patient doing well from previous visit. Discussed any concerns or questions with JULY, none at this time. Patient has [facility] contact number for further questions or concerns.

## 2024-03-29 DIAGNOSIS — Z51.11 ENCOUNTER FOR CHEMOTHERAPY MANAGEMENT: ICD-10-CM

## 2024-03-29 DIAGNOSIS — C91.Z0 B LYMPHOBLASTIC LEUKEMIA WITH T(9;22)(Q34;Q11.2);BCR-ABL1 (HCC): ICD-10-CM

## 2024-03-31 RX ORDER — IMATINIB MESYLATE 400 MG/1
600 TABLET, FILM COATED ORAL EVERY MORNING
Qty: 45 TABLET | Refills: 3 | Status: SHIPPED | OUTPATIENT
Start: 2024-03-31 | End: 2024-07-29

## 2024-03-31 RX ORDER — METHOTREXATE 2.5 MG/1
26.25 TABLET ORAL
Qty: 42 TABLET | Refills: 0 | Status: SHIPPED | OUTPATIENT
Start: 2024-03-31 | End: 2024-04-30

## 2024-04-01 PROCEDURE — RXMED WILLOW AMBULATORY MEDICATION CHARGE: Performed by: PEDIATRICS

## 2024-04-02 ENCOUNTER — TELEPHONE (OUTPATIENT)
Dept: PEDIATRIC HEMATOLOGY/ONCOLOGY | Facility: MEDICAL CENTER | Age: 24
End: 2024-04-02
Payer: MEDICAID

## 2024-04-02 PROCEDURE — RXMED WILLOW AMBULATORY MEDICATION CHARGE: Performed by: PEDIATRICS

## 2024-04-02 NOTE — TELEPHONE ENCOUNTER
Contact:  Phone number:[phone] (home)    Name of person spoken with and relationship to patient: JULY Jean-Baptiste (Self)   Patient’s Adherence:  How patient is doing on medication: Well    How many missed doses and reason: 0 N/A    Any new medications: No    Any new conditions: No    Any new allergies: No    Any new side effects: No    Any new diagnoses: No    How many doses remaining: at least 7 days    Did patient want to speak with pharmacist: No   Delivery:  Delivery date and method: 4/4/24 via [employer]    Needs by Date: 4/4/24    Signature required: No     Any additional details for : N/A   Teach Appointment Date:  N/A   Shipping Address:  [address] [place] [address]  [place] [address]   Medication(name,strength and dose):  imatinib (GLEEVEC) 400 MG tablet, methotrexate 2.5 MG tablet, mercaptopurine (PURINETHOL) 50 MG Tab, predniSONE (DELTASONE) 10 MG Tab, omeprazole (PRILOSEC) 20 MG delayed-release capsule, and sulfamethoxazole-trimethoprim (BACTRIM DS) 800-160 MG tablet    Copay:  $0   Payment Method:  n/a $0 copay   Supplies:  NA   Additional Information:  MARIAELENA Benito [facility]   Pharmacy Liaison  [phone]  4/2/2024 12:19 PM

## 2024-04-04 ENCOUNTER — PHARMACY VISIT (OUTPATIENT)
Dept: PHARMACY | Facility: MEDICAL CENTER | Age: 24
End: 2024-04-04
Payer: COMMERCIAL

## 2024-04-05 DIAGNOSIS — C91.Z0 B LYMPHOBLASTIC LEUKEMIA WITH T(9;22)(Q34;Q11.2);BCR-ABL1 (HCC): ICD-10-CM

## 2024-04-08 ENCOUNTER — TELEMEDICINE (OUTPATIENT)
Dept: PSYCHOLOGY | Facility: MEDICAL CENTER | Age: 24
End: 2024-04-08
Attending: PSYCHOLOGIST
Payer: MEDICAID

## 2024-04-08 DIAGNOSIS — C91.Z0 B LYMPHOBLASTIC LEUKEMIA WITH T(9;22)(Q34;Q11.2);BCR-ABL1 (HCC): ICD-10-CM

## 2024-04-08 PROCEDURE — 96158 HLTH BHV IVNTJ INDIV 1ST 30: CPT | Performed by: PSYCHOLOGIST

## 2024-04-08 PROCEDURE — 96159 HLTH BHV IVNTJ INDIV EA ADDL: CPT | Performed by: PSYCHOLOGIST
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
## 2024-04-22 ENCOUNTER — HOSPITAL ENCOUNTER (OUTPATIENT)
Dept: INFUSION CENTER | Facility: MEDICAL CENTER | Age: 24
End: 2024-04-22
Attending: PEDIATRICS
Payer: MEDICAID

## 2024-04-22 ENCOUNTER — OFFICE VISIT (OUTPATIENT)
Dept: PSYCHOLOGY | Facility: MEDICAL CENTER | Age: 24
End: 2024-04-22
Attending: PSYCHOLOGIST
Payer: MEDICAID

## 2024-04-22 VITALS
HEIGHT: 65 IN | HEART RATE: 67 BPM | TEMPERATURE: 97.3 F | WEIGHT: 153 LBS | SYSTOLIC BLOOD PRESSURE: 84 MMHG | OXYGEN SATURATION: 99 % | DIASTOLIC BLOOD PRESSURE: 47 MMHG | BODY MASS INDEX: 25.49 KG/M2 | RESPIRATION RATE: 20 BRPM

## 2024-04-22 DIAGNOSIS — C91.Z0 B LYMPHOBLASTIC LEUKEMIA WITH T(9;22)(Q34;Q11.2);BCR-ABL1 (HCC): ICD-10-CM

## 2024-04-22 DIAGNOSIS — C91.01 ACUTE LYMPHOBLASTIC LEUKEMIA (ALL) IN REMISSION (HCC): ICD-10-CM

## 2024-04-22 DIAGNOSIS — Z51.11 ENCOUNTER FOR CHEMOTHERAPY MANAGEMENT: ICD-10-CM

## 2024-04-22 LAB
ALBUMIN SERPL BCP-MCNC: 4.4 G/DL (ref 3.2–4.9)
ALBUMIN/GLOB SERPL: 2.1 G/DL
ALP SERPL-CCNC: 83 U/L (ref 30–99)
ALT SERPL-CCNC: 34 U/L (ref 2–50)
ANION GAP SERPL CALC-SCNC: 11 MMOL/L (ref 7–16)
AST SERPL-CCNC: 25 U/L (ref 12–45)
BASOPHILS # BLD AUTO: 0.7 % (ref 0–1.8)
BASOPHILS # BLD: 0.02 K/UL (ref 0–0.12)
BILIRUB CONJ SERPL-MCNC: <0.2 MG/DL (ref 0.1–0.5)
BILIRUB INDIRECT SERPL-MCNC: NORMAL MG/DL (ref 0–1)
BILIRUB SERPL-MCNC: 0.3 MG/DL (ref 0.1–1.5)
BUN SERPL-MCNC: 10 MG/DL (ref 8–22)
BURR CELLS/RBC NFR CSF MANUAL: 0 %
CALCIUM ALBUM COR SERPL-MCNC: 8.4 MG/DL (ref 8.5–10.5)
CALCIUM SERPL-MCNC: 8.7 MG/DL (ref 8.5–10.5)
CHLORIDE SERPL-SCNC: 107 MMOL/L (ref 96–112)
CLARITY CSF: CLEAR
CO2 SERPL-SCNC: 23 MMOL/L (ref 20–33)
COLOR CSF: COLORLESS
COLOR SPUN CSF: COLORLESS
CREAT SERPL-MCNC: 0.5 MG/DL (ref 0.5–1.4)
EOSINOPHIL # BLD AUTO: 0.1 K/UL (ref 0–0.51)
EOSINOPHIL NFR BLD: 3.5 % (ref 0–6.9)
ERYTHROCYTE [DISTWIDTH] IN BLOOD BY AUTOMATED COUNT: 58 FL (ref 35.9–50)
GFR SERPLBLD CREATININE-BSD FMLA CKD-EPI: 147 ML/MIN/1.73 M 2
GLOBULIN SER CALC-MCNC: 2.1 G/DL (ref 1.9–3.5)
GLUCOSE SERPL-MCNC: 110 MG/DL (ref 65–99)
HCT VFR BLD AUTO: 36.1 % (ref 42–52)
HGB BLD-MCNC: 12.3 G/DL (ref 14–18)
IMM GRANULOCYTES # BLD AUTO: 0.03 K/UL (ref 0–0.11)
IMM GRANULOCYTES NFR BLD AUTO: 1 % (ref 0–0.9)
LYMPHOCYTES # BLD AUTO: 0.14 K/UL (ref 1–4.8)
LYMPHOCYTES NFR BLD: 4.9 % (ref 22–41)
LYMPHOCYTES NFR CSF: 86 %
MCH RBC QN AUTO: 35.3 PG (ref 27–33)
MCHC RBC AUTO-ENTMCNC: 34.1 G/DL (ref 32.3–36.5)
MCV RBC AUTO: 103.7 FL (ref 81.4–97.8)
MONOCYTES # BLD AUTO: 0.26 K/UL (ref 0–0.85)
MONOCYTES NFR BLD AUTO: 9.1 % (ref 0–13.4)
MONOS+MACROS NFR CSF MANUAL: 4 %
NEUTROPHILS # BLD AUTO: 2.31 K/UL (ref 1.82–7.42)
NEUTROPHILS NFR BLD: 80.8 % (ref 44–72)
NEUTROPHILS NFR CSF: 10 %
NRBC # BLD AUTO: 0 K/UL
NRBC BLD-RTO: 0 /100 WBC (ref 0–0.2)
NUC CELL # CSF: <1 CELLS/UL (ref 0–10)
PLATELET # BLD AUTO: 186 K/UL (ref 164–446)
PMV BLD AUTO: 9.2 FL (ref 9–12.9)
POTASSIUM SERPL-SCNC: 3.8 MMOL/L (ref 3.6–5.5)
PROT SERPL-MCNC: 6.5 G/DL (ref 6–8.2)
RBC # BLD AUTO: 3.48 M/UL (ref 4.7–6.1)
RBC # CSF: 46 CELLS/UL
SODIUM SERPL-SCNC: 141 MMOL/L (ref 135–145)
SPECIMEN VOL CSF: 2 ML
TUBE # CSF: 2
TUBE # CSF: NORMAL
WBC # BLD AUTO: 2.9 K/UL (ref 4.8–10.8)

## 2024-04-22 PROCEDURE — 96159 HLTH BHV IVNTJ INDIV EA ADDL: CPT | Performed by: PSYCHOLOGIST

## 2024-04-22 PROCEDURE — 89051 BODY FLUID CELL COUNT: CPT

## 2024-04-22 PROCEDURE — 36591 DRAW BLOOD OFF VENOUS DEVICE: CPT

## 2024-04-22 PROCEDURE — 2023F DILAT RTA XM W/O RTNOPTHY: CPT | Performed by: PSYCHOLOGIST

## 2024-04-22 PROCEDURE — 96158 HLTH BHV IVNTJ INDIV 1ST 30: CPT | Performed by: PSYCHOLOGIST

## 2024-04-22 PROCEDURE — 700105 HCHG RX REV CODE 258: Mod: UD | Performed by: PEDIATRICS

## 2024-04-22 PROCEDURE — 85025 COMPLETE CBC W/AUTO DIFF WBC: CPT

## 2024-04-22 PROCEDURE — 99214 OFFICE O/P EST MOD 30 MIN: CPT | Mod: 25 | Performed by: PEDIATRICS

## 2024-04-22 PROCEDURE — 88108 CYTOPATH CONCENTRATE TECH: CPT

## 2024-04-22 PROCEDURE — 96375 TX/PRO/DX INJ NEW DRUG ADDON: CPT

## 2024-04-22 PROCEDURE — 96409 CHEMO IV PUSH SNGL DRUG: CPT

## 2024-04-22 PROCEDURE — 82248 BILIRUBIN DIRECT: CPT

## 2024-04-22 PROCEDURE — 96450 CHEMOTHERAPY INTO CNS: CPT

## 2024-04-22 PROCEDURE — 80053 COMPREHEN METABOLIC PANEL: CPT

## 2024-04-22 PROCEDURE — 96450 CHEMOTHERAPY INTO CNS: CPT | Performed by: PEDIATRICS

## 2024-04-22 PROCEDURE — A4212 NON CORING NEEDLE OR STYLET: HCPCS

## 2024-04-22 PROCEDURE — 700111 HCHG RX REV CODE 636 W/ 250 OVERRIDE (IP): Mod: UD | Performed by: PEDIATRICS

## 2024-04-22 PROCEDURE — 700101 HCHG RX REV CODE 250: Mod: UD | Performed by: PEDIATRICS

## 2024-04-22 RX ORDER — LIDOCAINE AND PRILOCAINE 25; 25 MG/G; MG/G
CREAM TOPICAL PRN
Status: DISCONTINUED | OUTPATIENT
Start: 2024-04-22 | End: 2024-04-23 | Stop reason: HOSPADM

## 2024-04-22 RX ORDER — HEPARIN SODIUM,PORCINE 10 UNIT/ML
30 VIAL (ML) INTRAVENOUS PRN
Start: 2024-04-22

## 2024-04-22 RX ORDER — ONDANSETRON 2 MG/ML
8 INJECTION INTRAMUSCULAR; INTRAVENOUS ONCE
Status: COMPLETED | OUTPATIENT
Start: 2024-04-22 | End: 2024-04-22

## 2024-04-22 RX ORDER — ONDANSETRON 2 MG/ML
8 INJECTION INTRAMUSCULAR; INTRAVENOUS ONCE
OUTPATIENT
Start: 2024-05-20

## 2024-04-22 RX ORDER — MIDAZOLAM HYDROCHLORIDE 1 MG/ML
2 INJECTION INTRAMUSCULAR; INTRAVENOUS ONCE
Status: COMPLETED | OUTPATIENT
Start: 2024-04-22 | End: 2024-04-22

## 2024-04-22 RX ORDER — ONDANSETRON 2 MG/ML
8 INJECTION INTRAMUSCULAR; INTRAVENOUS EVERY 8 HOURS PRN
OUTPATIENT
Start: 2024-05-20

## 2024-04-22 RX ORDER — ONDANSETRON 2 MG/ML
8 INJECTION INTRAMUSCULAR; INTRAVENOUS EVERY 8 HOURS PRN
Status: CANCELLED | OUTPATIENT
Start: 2024-04-22

## 2024-04-22 RX ORDER — 0.9 % SODIUM CHLORIDE 0.9 %
20 VIAL (ML) INJECTION PRN
OUTPATIENT
Start: 2024-04-22

## 2024-04-22 RX ADMIN — HEPARIN 500 UNITS: 100 SYRINGE at 11:32

## 2024-04-22 RX ADMIN — METHOTREXATE 12 MG: 25 INJECTION INTRA-ARTERIAL; INTRAMUSCULAR; INTRATHECAL; INTRAVENOUS at 11:03

## 2024-04-22 RX ADMIN — MIDAZOLAM HYDROCHLORIDE 2 MG: 1 INJECTION, SOLUTION INTRAMUSCULAR; INTRAVENOUS at 10:48

## 2024-04-22 RX ADMIN — LIDOCAINE AND PRILOCAINE 2.5 %: 25; 25 CREAM TOPICAL at 09:04

## 2024-04-22 RX ADMIN — ONDANSETRON 8 MG: 2 INJECTION INTRAMUSCULAR; INTRAVENOUS at 10:39

## 2024-04-22 RX ADMIN — VINCRISTINE SULFATE 2 MG: 1 INJECTION, SOLUTION INTRAVENOUS at 11:25

## 2024-04-22 ASSESSMENT — FIBROSIS 4 INDEX: FIB4 SCORE: 0.44
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
## 2024-04-22 NOTE — PROGRESS NOTES
[facility] for lab draw, doctors office visit, and chemotherapy administration.      Afebrile.  VSS.  Awake and alert in no acute distress.      Port accessed using a 22g 3/4 inch trevino needle with 1 attempt.  Labs drawn from the port without difficulty.   Pt tolerated well.      Pt to procedure room for LP.  Versed given per MAR. Procedure done by Dr Faye.  Pt tolerated well.    Chemotherapy dosage calculated independently by Huong Foster RN and Yue Poole RN and compared to road map for protocol [id number].  Calculations within 10% of written order.  Lab results reviewed.      Premedications and chemo given as ordered, see MAR.  Blood return verified prior to, during, and after chemotherapy infusion.  See Chemotherapy flowsheet.  PT tolerated well.  No side effects or complications noted.  Pt supine for 1 hr post LP. Port flushed per orders (see MAR) and de-accessed after completion.  Will return for next visit on 05/20/2024.

## 2024-04-22 NOTE — PROGRESS NOTES
"Pharmacy Chemotherapy Calculations Note:    Dx: B-ALL (CNS3)  Cycle: 5 Maintenance Day 1   Previous treatment: Maint C4D57 on 3/25/24     Protocol: Maintenance per Arm B of [id number] [place]: [id number]      Ht 1.659 m (5' 5.32\")   Wt 69.4 kg (153 lb)   BMI 25.22 kg/m²  Body surface area is 1.79 meters squared.    Labs from 4/22/24 reviewed - all within treatment parameters.       IT methotrexate 12 mg fixed dose for age >/=3 yrs   No Calc Req'd, ok for final dose = 12 mg IT injection by MD    Vincristine 1.5 mg/m² (max 2 mg) x 1.79 m² = 2.68 mg   ok for max final dose = 2 mg IV      Judy Bryant, PharmD, BCOP                "

## 2024-04-22 NOTE — PROCEDURES
Pediatric Oncology Lumbar Puncture  Procedure Note      Patient Name:  Tomas Jean-Baptiste  : 2000   MRN: [id number]    Service Location:  [facility]  Date of Service: 2024  Time: 11:00 AM    Procedure Performed By: Pepe Faye M.D.    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  He presents today for scheduled chemotherapy, Maintenance, Cycle 5, Day 1 and scheduled lumbar puncture with intrathecal chemotherapy.  Prior to the procedure, the risks and benefits were discussed with the patient.  Consent for the procedure was signed by patient and placed in the patient's chart.  All pertinent labs and history were reviewed and a complete History and Physical Examination were performed and placed in the medical record.  Tomas Roy was then taken to the procedure room where the procedure was performed.  All necessary safety equipment per ASA guidelines were available.  A time-out was performed and the patient identified by name,  and medical record number.  Gloves and mask were worn during the entire procedure.  Tomas Roy was prepped and draped in the usual sterile fashion with povoiodine.  He was positioned in the left decubitus position and all landmarks including  superior posterior iliac crest, umbilicus and vertebral bodies were identified by palpation.  A 3.0 in, 22 gauge spinal needle was introduced into the L3-L4 spinal interspace.  6 ml of clear fluid were obtained and sent for cell count and cytology.  Methotrexate 12 mg in 6 mL of NS was verified with the nurse bedside and then then administered into the spinal fluid. Tomas Roy tolerated the procedure without complication or bleeding. He was instructed to lie flat for 1 hour following the procedure.    Results:    PENDING    Pepe Faye MD  Pediatric Hematology / Oncology  [facility]  Cell.  [phone]

## 2024-04-22 NOTE — PROGRESS NOTES
"Pharmacy Chemotherapy Verification    Patient Name: Tomas Jean-Baptiste  Dx: pH+ B-Cell ALL         Protocol: Maintenance C3 and subsequent cycles(On Study Arm B, ID number: [id number])         Allergies: Amoxicillin       BP 90/50   Pulse 66   Temp 36.4 °C (97.6 °F) (Temporal)   Resp 20   Ht 1.659 m (5' 5.32\")   Wt 69.4 kg (153 lb)   SpO2 94%   BMI 25.22 kg/m²    Body surface area is 1.79 meters squared.    All lab results 4/22/24 within treatment parameters.     Drug Order   (Drug name, dose, route, IV Fluid & volume, frequency, number of doses) Maintenance, Cycle 5, Day 1  Previous treatment: Maintenance, C4D57 on 3/25/24   Medication = Vincristine (ONCOVIN)   Base Dose= 1.5 mg/m2  Calc Dose: Base Dose x 1.72 m2 = >2mg  Final Dose = 2 mg  Route = IV  Fluid & Volume = NS 25 mL  Admin Duration = Over 5 - 10 minutes    Days 1, 29, 57      Treat with MAX dose    Medication = Methotrexate PF  Base Dose = 12 mg fixed dose  Calc Dose: n/a  Final Dose = 12 mg   Route = INTRATHECAL  Fluid & Volume = pf NS 6 mL  Administration by MD Only  Day 1   No calculation required.   okay to treat with final dose   By my signature below, I confirm this process was performed independently with the BSA and all final chemotherapy dosing calculations congruent. I have reviewed the above chemotherapy order and that my calculation of the final dose and BSA (when applicable) corroborate those calculations of the  pharmacist. Discrepancies of 10% or greater in the written dose have been addressed and documented within the [facility] Progress notes.    Kayla Juarez, XochiltD, BCPS    "

## 2024-04-22 NOTE — PROGRESS NOTES
[facility] to [facility] for lab draw, doctors office visit, and chemotherapy administration.      Afebrile.  VSS.  Awake and alert in no acute distress.      Port accessed using a 22g 3/4 inch trevino needle with 1 attempt.  Labs drawn from the port without difficulty.   Pt tolerated well.      Chemotherapy dosage calculated independently by Huong Foster, MONTSERRAT and Yue Poole RN and compared to road map for protocol [id number].  Calculations within 10% of written order.  Lab results reviewed.      Premedications and chemo given as ordered, see MAR.  Blood return verified prior to, during, and after chemotherapy infusion.  See Chemotherapy flowsheet.  PT tolerated well.  No side effects or complications noted.  Port flushed per orders (see MAR) and de-accessed after completion. Will return for next visit on 05/20/24.

## 2024-04-22 NOTE — H&P
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 the time demonstrated a WBC of 0.4, Hgb 8.8 and platelets 125.  ANC at the time was measured at only 170.  JULY was otherwise clinically well appearing.  He did receive a one-time normal saline bolus for his nausea and vomiting and was sent home with instructions to HOLD his oral mercaptopurine and oral methotrexate which began being held on 7/28/2023.  Per protocol, imatinib was continued at previous dose of 600 mg in the morning. Tomas Roy would return to clinic again on 8/2/2023 and 8/7/2023.  On both occasions, ANC remained under 500 and oral therapy (with the exception of imatinib) continue to be held while awaiting count recovery.  Ultimately, on 8/11/2023 after 14 days of therapy being held, Tomas Roy returned to clinic and WBC had increased to 2.1 with ANC increasing to 1500 with an absolute  monocyte count of 290. Tomas Roy was then instructed to resume his oral chemotherapy at 100% of prior dosing with (due to timing with Day 1 of Cycle 2 with LP 3 days later, no weekly MTX was administered).  Imatinib was never held.  On 8/14/2023 he was seen in clinic for Day 1 of Cycle 2 of Maintenance with lumbar puncture, vincristine, and 5-day pulse of steroids.  At the time, his ANC was 2010 and his oral chemotherapy was continued at 100% dosing.  Given his history of previously drop in counts, he was scheduled to come back for repeat labs on 8/29/2023 at which point his WBC count was 1.4 and his ANC remained greater than 500 at 1140.  Again, his therapy was continued with imatinib 550 mg by mouth in the morning as well as 100% dosing of methotrexate and 100% dosing of 6-mercaptopurine.  When Tomas Roy returned to clinic on 9/11/2023 for his Maintenance, Cycle 2, Day 29 therapy he was noted to have had another drop in his WBC to 600 and his ANC accordingly was also decreased at 410.  He did receive vincristine in accordance with protocol as well as starting a 5-day pulse of prednisone per protocol.  Given however that his ANC had dropped below 500 his oral methotrexate and oral 6-MP were again held.  He was instructed return to clinic 1 week later for lab evaluations.  On 9/19/2023 he returned to clinic and WBC had improved slightly to 0.8 however ANC remained low at 260 with an absolute monocyte count of 220.  Given that counts not recovered his oral chemotherapy remained held for an additional week.  On 9/26/2023 at 14 days after initially holding chemotherapy, he was seen back in clinic at which time WBC improved again to 1.1 however ANC did not quite recover and remained at 490 with an absolute monocyte count of 330.  Given that 2 weeks had elapsed with myelosuppression, imatinib was held in addition to oral 6-MP and methotrexate. Tomas Roy was instructed to return to clinic on 9/29/2023  for repeat counts.  On 9/29/2023 WBC had improved to 2.4 and ANC had finally recovered with 1530 and an absolute monocyte count of 510.  Given a recovery with ANC greater than 750 and platelets greater than 75, oral chemotherapy was restarted at 50% of initial dosing and imatinib was restarted at 100% of initial dosing.  On 10/9/2023, Tomas Roy returned to clinic for his Day 57 of Cycle 2 visit.  At the time of his visit, his ANC had recovered after holding chemotherapy and then restarting at 50% dosing 11 days prior.  He did however in clinic spiked a temperature 102.5 °F.  For this reason despite his ANC being improved, no dose adjustments were made in his chemotherapy.  He continued with 50% dosing with 100% adherence of his 6-MP and methotrexate as well as his imatinib at 550 mg PO QHS.   Tomas Roy was then seen in clinic again on 11/6/2023 for his Day 1 of Cycle 3 of Maintenance therapy.  At the time, his imatinib dose was adjusted back up to 600 mg daily taken in the morning.  Additionally, his methotrexate was adjusted back up to 75% of expected dosing and his mercaptopurine was increased to 75% of expected dosing as well.  He will return to clinic on 12/4/2023 for his Day 29 of Cycle 3 therapy.  At the time, his ANC was exactly 1500 and therefore no dose adjustments were made and he continued at 75% dosing for oral chemotherapy as well as the imatinib dose of 600 mg daily.  On 1/2/2024 he was seen for his Day 57 evaluations and his ANC had dropped to 1450 again not prompting any change in oral chemotherapy dosing.  Tomas Roy completed his Cycle 4 chemotherapy without any adverse events or complications.  He did not have any changes in medications either.  Today, he presents back for Cycle 5, Day 1 therapy.  Interval history is remarkable  only for a recent trip to [place] for vacation.  No medical concerns or complaints.  Reports 100% adherence with oral imatinib, oral methotrexate and oral  mercaptopurine.    On presentation today, no concerns or complaints.  Has remained afebrile without any recent or remote illness.  Energy and activity improved with every day as well as strength.  Eating and drinking very well.  Reports that he had very good food to eat while in [place] and gained some weight.  No complaints of any nausea, vomiting, diarrhea or constipation.  No difficulty with swallowing.  No abdominal discomfort or pain.  Not complaining of any shortness of breath, cough or congestion.  No respiratory complaints.  No complaints of any aches or pains.  Not complaining of any peripheral neuropathies today.  No headaches, change in vision or neurologic status changes.  100% adherent with oral chemotherapy to include imatinib 600 mg daily in the morning, methotrexate 10.5 tablets each week (not this week) as well as mercaptopurine 2 tablets daily x 7 days.  No other concerns or complaints at this time.    Review of Systems:     Constitutional: Afebrile.  No recent or remote illness.  Energy and activity are at baseline.  Eating and drinking well.  No complaints of any decrease in appetite.  Increased weight since last visit.  HENT: Negative for auditory changes, nosebleeds and sore throat.  No mouth sores.  Eyes: Negative for visual changes.  Respiratory: Negative for  shortness of breath.  No cough.  Cardiovascular: Negative.  Gastrointestinal: Negative for nausea, vomiting, abdominal pain, diarrhea, constipation.  Genitourinary: Negative.  Musculoskeletal: Negative.  Skin: Negative for rash, signs of infection.  Neurological: Negative for numbness, tingling, sensory changes, weakness or headaches.  No peripheral neuropathy complaints today.  Endo/Heme/Allergies: Does not bruise/bleed easily.    Psychiatric/Behavioral: No changes in mood, appropriate for age.     PAST MEDICAL HISTORY:     Oncologic History:  2-3 week history of worsening fatigue, right lower extremity pain  Presentation to OSH and  diagnosed with right LE superficial thrombus, subsegmental PE and hyperleukocytosis, anemia and thrombocytopenia  Transferred to [facility] for definitive care  Presenting (local) WBC > 440K, Hgb 10.0, platelets 53, (automated differential ANC 3190, ALC 75,310, absolute monocyte count 47458, absolute blast count 340,560)  Uric Acid 15.6, phosphorus 5.6, LDH 1114  Rasburicase x 1 dose given   Peripheral Blood flow cytometry demonstrating CD10 pos, CD19 pos, CD20 neg, CD22 dim (60%) 5/28/2022  Peripheral blood FISH for BCR-ABL1 positive in 98% of analyzed cells     Age at Diagnosis: 21 years  White Blood Cell Count at Presentation: > 440 k/uL  Testicular Disease Status: Negative (see procedure note 5/30/2022)  CNS Status: 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, diagnostic LP with WBC 1, RBC 3 and no evidence of leukemic blasts 5/30/2022  Steroid Pre-treatment: None  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     All inclusion/exclusion criteria for [id number] met and consent signed - enrolled 5/29/2022   All inclusion/exclusion criteria for [id number] met and consent signed - enrolled 5/30/2022  Confirmatory bone marrow aspirate and biopsy and diagnostic LP + cytarabine 70 mg IT 5/30/2022  Induction therapy (ON STUDY [id number]) started 5/30/2022  Bone marrow immunohistochemistry consistent with diagnosis of B-ALL comprising 90% of marrow cellularity  B[facility] COG purposes:  Flow cytom  Of the blood pressure little high that is a problem is a cultural problem is well and cultural genetic and everything else like that unfortunately breathalyzers such bad heart disease diabetes things like that  populations etry consistent with peripheral blood, cytogenetics remarkable for known t(9;22)  CSF with WBC 1, RBC 3, no blasts identified on cytospin  FISH results available 5/31/2022 making patient eligible for transfer  from [place] [address] to [place] [address] as eligibility requirements were met for [place] [address]  Patient unenrolled from [place] [address] (BCR-ABL1 fusion positive) 6/1/2022  Consent signed for [place] [address] and patient enrolled 6/1/2022 (see eligibility checklist from 5/31/2022 and consent note from 6/1/2022)  Imatinib 400 mg PO QAM / 200 mg PO QPM started 6/3/2022 (allowed per [place] [id number])  Patient completed the first 15 days of a Standard 4-drug Induction on 6/13/2022  Start of [place] [address](OS), Induction IA Part 2, Day 15 6/13/2022  End of Induction 1A Part 2 - MRD negative  Start of [place] [address](OS), Induction IA Part 2, Day 15 7/5/2022  Induction IB DELAYED 2 weeks 14 days from 7/26/2022-8/9/2022) for myelosuppression - Start of Day 22 cytarabine block 8/9/2022  Induction IB Day 42 delayed 9 days for myelosuppression - Day 42 evaluations 9/7/2022  End of Induction IB - Flow cytometric MRD negative, MRD by IgH-TCR PCR [id number]%  Randomization to [facility]-Experimental Arm B of [place] [address]  Start of AR-Experimental Arm B, Interim Maintenance 9/29/2022  Weight based increase in dose of imatinib to 400 mg PO AM and 250 mg PM 9/29/2022  Thrombocytopenia not permissive of proceeding with Day 15 of Interim Maintenance  AR-Experimental Arm B, Interim Maintenance, Day 15 delayed 4 days, start 10/17/2022  AR-Experimental Arm B, Interim Maintenance, Day 29, start 11/1/2022  AR-Experimental Arm B, Interim Maintenance, Day 43, start 11/14/2022  Last does of 6-MP 11/27/2022  AR-Experimental Arm B, Delayed Intensification, Day 1, start 12/6/2022  Admission with Severe Septic Shock 12/27/2022  Imatinib HELD 12/27/2022-1/8/2023  AR-Experimental Arm B, Delayed Intensification, Day 29 DELAYED 14 days with start 1/17/2023  Hospitalization 2/7/2023 on Day 50 of Delayed Intensification with left shoulder pain ultimately diagnosed with Bacteroides fragilis infection  AR-Experimental Arm B, Interim Maintenance, Day 1 DELAYED 7 days with start 2/27/2023  AR-Experimental Arm B,  "Interim Maintenance, Day 11 DELAYED 4 days for platelets 43K on 3/9/2023  AR-Experimental Arm B, Interim Maintenance, Day 11 VCR given and MTX omitted for platelets 43K 3/13/2023  Imatinib HELD 3/30/2023-4/11/2023  AR-Experimental Arm B, Interim Maintenance, Day 21 (DELAYED 22 DAYS) - administered 4/11/2023 with methotrexate 80 mg/m² IV  AR-Experimental Arm B, Interim Maintenance, Day 31 (\"true\" Day 31 4/25/2023) - VCR and IT MTX given 4/28/2023 - IV MTX omitted  AR-Experimental Arm B, Interim Maintenance, Day 41 met with counts - administered 5/5/2023 with methotrexate 80 mg/m² IV     Start of Maintenance, Cycle 1, Day 1 -5/22/2023  Cranial radiation 6/5/2023-6/16/2023 (10 fractions)  Maintenance, Cycle 1, Day 29 - 6/23/2023 (no IT MTX given)  Maintenance, Cycle 1, Day 67, presented with fatigue nausea and vomiting found to have ANC less than 500  Methotrexate (oral) and mercaptopurine held 7/27/2023 - continued with imatinib  Methotrexate (oral) and mercaptopurine held 8/2/2023 - continued with imatinib  Methotrexate (oral) and mercaptopurine held 8/7/2023 - continued with imatinib  Restarted methotrexate (oral) and mercaptopurine at 100% previous dosing on 8/11/2023 - continued with imatinib  Maintenance, Cycle 2, Day 1 - 8/14/2023  Maintenance, Cycle 2, Day 29 - 9/11/2023  Methotrexate (oral) and mercaptopurine held 9/11/2023 - continued with imatinib  Methotrexate (oral) and mercaptopurine held 9/19/2023 - continued with imatinib  Methotrexate (oral) and mercaptopurine held 9/26/2023 - HELD imatinib  Methotrexate (oral) restarted at 50% dosing and mercaptopurine restarted at 50% dosing 9/29/2023 - RESTARTED imatinib  Maintenance, Cycle 2, Day 57 - 10/9/2023  Maintenance, Cycle 3, Day 1 - 11/6/2023: Methotrexate (oral) increased to 75% dosing and mercaptopurine increased to 75% dosing.  Imatinib increased to 600 mg QAM 11/6/2023  Maintenance Cycle 3, Day 29: 12/4/2023 No changes made to the dosing of oral " chemotherapy  Maintenance, Cycle 4, Day 1: No dose adjustments made however ANC slightly greater than >1500.  Oral chemotherapy did not need weight adjustment.  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  3) Hyperleukocytosis  4) Hyperuricemia  5) Hyperphosphatemia  6) Right Lower Extremity Superficial Thrombus  7) Subsegmental Pulmonary Embolism  8) 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  9) Bacteroides fragilis soft tissue infection left shoulder  10) Anxiety/Depression     Past Surgical History:     1) Temporary Right IJ Pharesis Catheter Placement 5/28/2022  2) Right-sided Port-A-Cath placement 8/29/2022  3) IR drainage left calf hematoma  4) Left shoulder I&D  5) Cranial XRT 6/5/2023-6/16/2023     Birth/Developmental History:   1st of three children  Unremarkable pregnancy  Unremarkable delivery     Allergies:             Allergies as of 05/27/2022 - Reviewed 05/27/2022   Allergen Reaction Noted    Amoxicillin   04/03/2020      Social History:   Lives at home with mother, brother and sister.  Engineering major at [employer]   Two dogs. Father not involved.     Family History:     Family History             Family History   Problem Relation Age of Onset    No Known Problems Mother      Diabetes Paternal Grandfather      Hypertension Paternal Grandfather      Hyperlipidemia Paternal Grandfather      Cancer Neg Hx      Heart Disease Neg Hx      Stroke Neg Hx           No significant family history of cancer.  Both maternal and paternal family history of diabetes.     Immunizations:  UTD    Medications:   Current Outpatient Medications on File Prior to Encounter   Medication Sig Dispense Refill    imatinib (GLEEVEC) 400 MG tablet Take 1.5 Tablets by mouth every morning for 120 days. Pt takes 200 mg + 400 mg for a total dose of 600 mg daily 45 Tablet 3    methotrexate 2.5 MG tablet Take 10.5 Tablets by mouth every 7 days for  "30 days. 42 Tablet 0    mercaptopurine (PURINETHOL) 50 MG Tab Take 2 tablets by mouth in the evening x 7 days of the week. 64 Tablet 5    predniSONE (DELTASONE) 10 MG Tab Take 3.5 tablets by mouth in the morning and evening for 5 Days at Day 1, 29 and 57 of each cycle. 35 Tablet 6    omeprazole (PRILOSEC) 20 MG delayed-release capsule Take 1 Capsule by mouth every day for 180 days. 30 Capsule 5    LORazepam (ATIVAN) 1 MG Tab Take 1 mg by mouth every four hours as needed for Anxiety.      sulfamethoxazole-trimethoprim (BACTRIM DS) 800-160 MG tablet Take 2 Tablets by mouth twice daily two days a week. 48 Tablet 11    ondansetron (ZOFRAN ODT) 8 MG TABLET DISPERSIBLE Dissolve 1 Tablet by mouth every 6 hours as needed for Nausea/Vomiting. 10 Tablet 0    Melatonin 5 MG Cap Take 5 mg by mouth at bedtime as needed. (Patient not taking: Reported on 1/29/2024)       No current facility-administered medications on file prior to encounter.     OBJECTIVE:     Vitals:   BP 90/50   Pulse 66   Temp 36.7 °C (98 °F) (Temporal)   Resp 20   Ht 1.659 m (5' 5.32\")   Wt 69.4 kg (153 lb)   SpO2 94%     Labs:    Hospital Outpatient Visit on 04/22/2024   Component Date Value    WBC 04/22/2024 2.9 (L)     RBC 04/22/2024 3.48 (L)     Hemoglobin 04/22/2024 12.3 (L)     Hematocrit 04/22/2024 36.1 (L)     MCV 04/22/2024 103.7 (H)     MCH 04/22/2024 35.3 (H)     MCHC 04/22/2024 34.1     RDW 04/22/2024 58.0 (H)     Platelet Count 04/22/2024 186     MPV 04/22/2024 9.2     Neutrophils-Polys 04/22/2024 80.80 (H)     Lymphocytes 04/22/2024 4.90 (L)     Monocytes 04/22/2024 9.10     Eosinophils 04/22/2024 3.50     Basophils 04/22/2024 0.70     Immature Granulocytes 04/22/2024 1.00 (H)     Nucleated RBC 04/22/2024 0.00     Neutrophils (Absolute) 04/22/2024 2.31     Lymphs (Absolute) 04/22/2024 0.14 (L)     Monos (Absolute) 04/22/2024 0.26     Eos (Absolute) 04/22/2024 0.10     Baso (Absolute) 04/22/2024 0.02     Immature Granulocytes (a* 04/22/2024 " 0.03     NRBC (Absolute) 04/22/2024 0.00     Sodium 04/22/2024 141     Potassium 04/22/2024 3.8     Chloride 04/22/2024 107     Co2 04/22/2024 23     Anion Gap 04/22/2024 11.0     Glucose 04/22/2024 110 (H)     Bun 04/22/2024 10     Creatinine 04/22/2024 0.50     Calcium 04/22/2024 8.7     Correct Calcium 04/22/2024 8.4 (L)     AST(SGOT) 04/22/2024 25     ALT(SGPT) 04/22/2024 34     Alkaline Phosphatase 04/22/2024 83     Total Bilirubin 04/22/2024 0.3     Albumin 04/22/2024 4.4     Total Protein 04/22/2024 6.5     Globulin 04/22/2024 2.1     A-G Ratio 04/22/2024 2.1     Direct Bilirubin 04/22/2024 <0.2     Indirect Bilirubin 04/22/2024 see below     GFR (CKD-EPI) 04/22/2024 147      Physical Exam:    Constitutional: Well-developed, well-nourished, and in no distress.  Very well-appearing.  HENT: Normocephalic and atraumatic. No nasal congestion or rhinorrhea. Oropharynx is clear and moist. No oral ulcerations or sores.    Eyes: Conjunctivae are normal. Pupils are equal, round.  EOMI.  Nonicteric.  Neck: Normal range of motion of neck, no adenopathy.    Cardiovascular: Normal rate, regular rhythm and normal heart sounds.  No murmur heard. DP/radial pulses 2+, cap refill < 2 sec.  Pulmonary/Chest: Effort normal and breath sounds normal. No respiratory distress. Symmetric expansion.  No crackles or wheezes.  Abdomen: Soft. Bowel sounds are normal. No distension and no mass. There is no hepatosplenomegaly.    Genitourinary:  Deferred.  Musculoskeletal: Normal range of motion of lower and upper extremities bilaterally.   Neurological: Alert and oriented to person and place. Exhibits normal muscle tone bilaterally in upper and lower extremities. Gait normal. Coordination normal.    Skin: Skin is warm, dry and pink.  No rash or evidence of skin infection.  No pallor.   Psychiatric: Mood and affect normal for age.    ASSESSMENT AND PLAN:     Tomas Jean-Baptiste is a previously healthy 23 year old male with   Precursor B-Cell Lymphoblastic Leukemia with BCR-ABL1 fusion and whose End of Induction IB MRD was both negative by flow cytometry and PCR who presents for Maintenance, Cycle 5, Day 1     1) Ph+ Precursor B-Cell Acute Lymphoblastic Leukemia, in MRD Remission:  - 2-3 weeks of symptoms  - Presenting WBC > 440 k/uL, hyperleukocytosis  - Start of Hydroxyurea (1500 mg PO Q8) 2320 on 5/27/2022  - discontinued after only 55 hours  - No steroid pretreatment  - 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  - Testicular status NEGATIVE       - Flow cytometry of both 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, FISH positive for BCR-ABL1 translocation  - Enrolled on [facility] [id number]  - Initially enrolled on [facility] [id number] - but 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                            - Enrolled on [facility] [id number] and began study 6/13/2022              - Started imatinib therapy 6/3/2022   - End of Induction IA and IB MRD negative  - Imatinib dose increased to 400 mg PO AM and 250 mg PM 9/29/2022   -* Note:  All imatinib doses given inpatient rounded down to 600 mg   - Imatinib held for Septic Shock 12/27/2022-1/8/2023   - Imatinib 600 mg PO daily restarted 1/8/2023 inpatient   - Imatinib 400 mg PO QAM and 250 mg PO QHS 1/14/2023-1/17/2023   - Imatinib 600 mg PO QAM 1/17/2023 - 3/30/2023   - Imatinib 600 mg PO QAM 4/11/2023 - 8/13/2023   - Imatinib 550 mg PO QAM 8/14/2023 - 9/26/2023   - Imatinib 550 mg PO QAM 9/29/2023 -11/6/2023   - Imatinib 600 mg PO QAM 11/6/2023 - PRESENT (Updated for weight today 4/22/2024)     - Imatinib dosing updated every 12 weeks. Continue Imatinib at 600 mg PO QAM (4/22/2024)               - WBC 2.9, Hgb 12.3, platelets 186            - ANC 2310,             - AST 25, ALT 34, total bilirubin 0.3                - ANC today greater than 1500, however not 2 consecutive ANC or 3 in 6 weeks.  Therefore, will not increase oral chemotherapy for counts.        [id number](OS), AR Arm B, Maintenance, Cycle 5, Day 1:   ** Continue Imatinib at 600 mg PO QAM  ** Vincristine 2 mg IV x 1 dose (GIVEN TODAY)  ** Begin pulse of prednisone 35mg PO QAM and 35 mg PO QHS x 5 days (97.77% of expected dose)      ** Continue oral MTX to 10.5 tablets PO weekly (75% of expected dosing)  ** Continue oral 6-mercaptopurine 2 tablets PO daily x 7 days (73.32% of expected dosing)     - Return to infusion center in 1 month for Cycle 5, Day 29     2) Chemotherapy Related Pancytopenia:           - WBC 2.9, Hgb 12.3, platelets 186            - ANC 2310,   - Transfuse for hemoglobin less than 7 gm/dL or symptomatic  - Transfuse for platelets less than 10,000/microliters or symptomatic  - No transfusions necessary today     3) At Risk of Opportunistic Lung Infection:  - Bactrim DS PO BID Sat and Sun for PJP prophylaxis     4) Central Access:   - R Port-A-Cath in place      5) Research Participant: ON STUDY [id number], AR Arm B, Maintenance, Cycle 5, Day 1             [facility] - Source Data         Diagnosis: Ph+ Precursor B-Cell Acute Lymphoblastic Leukemia     Disease Status: EOI1A MRD NEGATIVE, EOI1B RD NEGATIVE, CNS3c, testicular negative, HSV1 IgG POSITIVE, CMV IgG NEGATIVE, VARICELLA IgG POSITIVE     Active Studies: [id number], [id number]                                                                                                      Inactive Studies: [id number]                                                                                                                                                Optional Studies: None             Protocol: International Phase 3 trial in [place] 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      Treatment Plan: [id number](OS), AR-Arm B, Maintenance, Cycle 5, Day 1 (4/22/2024)     Height: 1.659 m     Weight: 69.4 kg     BSA: 1.79 m²    (Maintenance, Cycle 5,   Day 1, 4/22/2024: Height and weight updated per protocol)                                                                                                                                           Performance Status: Karnofsky 90, ECOG 1 (4/22/2024)     Treatment Plan Medications:  (100% adherent)     Continue oral 6- mercaptopurine 2 tabs x 7 days   Continue oral MTX to 10.5 tabs weekly   Continue Imatinib to 600 mg PO QHS      Evaluations / Study Labs:  (4/22/2024)     WBC 2.9, Hgb 12.3, platelets 186  ANC 2310,     AST 25, ALT 34, total bilirubin 0.3     CSF PENDING    Therapy Given: (4/22/2024)     Oral chemotherapy as above  Methotrexate 12 mg IT x 1 dose  Vincristine 2 mg IV x 1  Start 5 days pulse of oral prednisone     Maintenance, Cycle 5 Toxicities:     None         Disposition: Return to infusion center in 1 month for Day 29 of Cycle 5 of Maintenance    Pepe Faye MD  Pediatric Hematology / Oncology  [facility]  Cell.  [phone]  [facility]. [phone]

## 2024-04-23 ENCOUNTER — TELEPHONE (OUTPATIENT)
Dept: INFUSION CENTER | Facility: MEDICAL CENTER | Age: 24
End: 2024-04-23
Payer: MEDICAID

## 2024-04-23 LAB — CYTOLOGY REG CYTOL: NORMAL

## 2024-04-23 NOTE — TELEPHONE ENCOUNTER
Discharge phone call to JULY Pt reports he is doing well.  No questions or concerns at this time.

## 2024-04-23 NOTE — ADDENDUM NOTE
Encounter addended by: Cherrie Urbano R.N. on: 4/23/2024 7:57 AM   Actions taken: Charge Capture section accepted

## 2024-04-25 DIAGNOSIS — Z51.11 ENCOUNTER FOR CHEMOTHERAPY MANAGEMENT: ICD-10-CM

## 2024-04-26 RX ORDER — METHOTREXATE 2.5 MG/1
26.25 TABLET ORAL
Qty: 42 TABLET | Refills: 0 | Status: SHIPPED | OUTPATIENT
Start: 2024-04-26 | End: 2024-05-27

## 2024-04-30 ENCOUNTER — TELEMEDICINE (OUTPATIENT)
Dept: PSYCHOLOGY | Facility: MEDICAL CENTER | Age: 24
End: 2024-04-30
Payer: MEDICAID

## 2024-04-30 DIAGNOSIS — C91.Z0 B LYMPHOBLASTIC LEUKEMIA WITH T(9;22)(Q34;Q11.2);BCR-ABL1 (HCC): ICD-10-CM

## 2024-04-30 PROCEDURE — 96158 HLTH BHV IVNTJ INDIV 1ST 30: CPT | Performed by: PSYCHOLOGIST

## 2024-04-30 PROCEDURE — 96159 HLTH BHV IVNTJ INDIV EA ADDL: CPT | Performed by: PSYCHOLOGIST

## 2024-04-30 NOTE — PROGRESS NOTES
PEDIATRIC BEHAVIORAL HEALTH VISIT    ADULT REQUEST FOR CONFIDENTIALITY    Name:  Tomas Jean-Baptiste  MRN:  [id number]  :  2000  Age:  23 y.o.  Referring Provider: Dr. Faye (Hem/Onc)  Pediatrician:  Margarito Arvizu M.D.  Date of Service:  2024    Discussed with patient: You have chosen to receive care through the use of secure virtual/telemedicine. This platform enables health care providers at different locations to provide safe, effective, and convenient care through the use of technology. As with any health care service, there are risks associated with the use of this platform, including equipment failure, poor image resolution or no image, and  issues. You also understand that I cannot physically examine you and that you may need to come to clinic to complete the assessment.    Patient verbally asserts understanding of the risks and benefits of telemedicine as explained. Endorses all questions answered regarding telemedicine.     I conducted this encounter from [facility] via secure, live, telephone or audio/video conference with the patient. Patient was located in [place], [place]. Prior to the interview, the risks and benefits of telemedicine were discussed with the patient and verbal consent was obtained.     Persons in Attendance: Tomas Roy     Chief Complaint/ Reason for Appointment: JULY, a 24 y/o male currently in treatment for [place] Chromosome Precursor B-Cell Acute Lymphoblastic Leukemia was referred to counseling to assist in decreasing anxiety he has been experiencing and processing ways for how he can find himself now and what life will look like. See intake note dated 23    Mental Status Exam:   General description In no apparent distress, well-groomed, appropriately attired, well-nourished, and cooperative with interview  Interactional style Culturally appropriate  Eye contact Normal and appropriate for culture  Speech  Unimpaired, fluid and clear, normal rate and rythem  Motor activity Normal motor activity  Orientation Oriented to person time, place and situation  Intellectual functioning Unimpaired  Memory Unimpaired  Attention and concentration Intact and normative concentration  Fund of knowledge Average  Mood Generally euthymic, though tearful at times when talking about his mother's actions  Affect Appropriate  Perceptual Disturbances None apparent  Thought Process  No abnormalities apparent       Associations Unimpaired associations       Abstractions Normal abstractions, intact       Insight Insight - adequate and normative       Judgment Judgments - intact and normative   Thought Content  No apparent delusions    Risk Assessment:  Tomas Roy denied current concerns regarding risk to self or others.       Issues Discussed:   This provider met with JULY to continue assisting in rediscovering himself after the trauma of his cancer diagnosis and treatment as well as how childhood impacts this. Today the session was spent continuing to process the situation with his mother and his view of things. We reviewed the continued impact of her actions and his disbelief that she is trying to mess with his future. Time was spent reviewing the grief process and how he is experiencing more of those feelings now.     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 After meeting with JULY during his intake, it appears he could benefit from learning anxiety management skills, processing what he has been through, and how to live the life he wants. Tomas Jean-Baptiste  could benefit from learning appropriate ways of expressing and coping with his emotions. He could also benefit from learning Cognitive Behavioral Therapy (CBT) and Acceptance and Commitment Therapy (ACT) tools to understand the interaction of thoughts, feelings, and actions. As well as Trauma Focused-CBT to process his cancer journey. Tomas Jean-Baptiste agreed with the plan.       PLAN  JULY will learn and utilize appropriate ways to express and cope with emotions.    He will learn the connection between thoughts, feelings, and actions utilizing CBT and ACT tools.,   Processed the thoughts that arise around his mother and her actions.  JULY will process how their medical diagnosis is impacting their life.    Next visit we will process the picture he created and feelings about his end of treatment.    The above diagnostic impressions, recommendations, and treatment plan were discussed with and agreed upon by Tomas Roy, and his caregivers. Care will be coordinated with Tomas Roy's healthcare team, as appropriate.    Total time spent on encounter was 60 minutes.    Laurie Ortega, PhD  Pediatric Psychologist   Licensed Psychologist, [place] # [id number]  [facility], Behavioral Health

## 2024-05-01 ENCOUNTER — TELEPHONE (OUTPATIENT)
Dept: PHARMACY | Facility: MEDICAL CENTER | Age: 24
End: 2024-05-01
Payer: MEDICAID

## 2024-05-01 PROCEDURE — RXMED WILLOW AMBULATORY MEDICATION CHARGE: Performed by: PEDIATRICS

## 2024-05-01 NOTE — TELEPHONE ENCOUNTER
Contact:  Phone number:[phone] (home)   Name of person spoken with and relationship to patient: JULY Jean-Baptiste (Self)  Patient’s Adherence:  How patient is doing on medication: Well   How many missed doses and reason: 0 N/A   Any new medications: No   Any new conditions: No   Any new allergies: No   Any new side effects: Yes he says his feet are sweaty   Any new diagnoses: No   How many doses remaining: at least 7 days   Did patient want to speak with pharmacist: No  Delivery:  Delivery date and method: 5/6/24 via [employer]    Needs by Date: 5/8/24   Signature required: No    Any additional details for : N/A  Teach Appointment Date:  N/A  Shipping Address:  [address] [place] [address] [place] [address]  Medication(name,strength and dose):  imatinib (GLEEVEC) 400 MG tablet, methotrexate 2.5 MG tablet, mercaptopurine (PURINETHOL) 50 MG Tab, predniSONE (DELTASONE) 10 MG Tab, and sulfamethoxazole-trimethoprim (BACTRIM DS) 800-160 MG tablet   Copay:  $0  Payment Method:  n/a $0 copay  Supplies:  NA  Additional Information:  NA

## 2024-05-03 PROCEDURE — RXMED WILLOW AMBULATORY MEDICATION CHARGE: Performed by: PEDIATRICS

## 2024-05-08 ENCOUNTER — PHARMACY VISIT (OUTPATIENT)
Dept: PHARMACY | Facility: MEDICAL CENTER | Age: 24
End: 2024-05-08
Payer: COMMERCIAL

## 2024-05-15 ENCOUNTER — APPOINTMENT (OUTPATIENT)
Dept: ADMISSIONS | Facility: MEDICAL CENTER | Age: 24
End: 2024-05-15
Attending: SURGERY
Payer: MEDICAID

## 2024-05-17 ENCOUNTER — PRE-ADMISSION TESTING (OUTPATIENT)
Dept: ADMISSIONS | Facility: MEDICAL CENTER | Age: 24
End: 2024-05-17
Attending: SURGERY
Payer: MEDICAID

## 2024-05-20 ENCOUNTER — HOSPITAL ENCOUNTER (OUTPATIENT)
Dept: INFUSION CENTER | Facility: MEDICAL CENTER | Age: 24
End: 2024-05-20
Attending: PEDIATRICS
Payer: MEDICAID

## 2024-05-20 ENCOUNTER — ANESTHESIA (OUTPATIENT)
Dept: SURGERY | Facility: MEDICAL CENTER | Age: 24
End: 2024-05-20
Payer: MEDICAID

## 2024-05-20 ENCOUNTER — ANESTHESIA EVENT (OUTPATIENT)
Dept: SURGERY | Facility: MEDICAL CENTER | Age: 24
End: 2024-05-20
Payer: MEDICAID

## 2024-05-20 ENCOUNTER — HOSPITAL ENCOUNTER (OUTPATIENT)
Facility: MEDICAL CENTER | Age: 24
End: 2024-05-20
Attending: SURGERY | Admitting: SURGERY
Payer: MEDICAID

## 2024-05-20 VITALS
HEART RATE: 80 BPM | SYSTOLIC BLOOD PRESSURE: 116 MMHG | TEMPERATURE: 96.3 F | DIASTOLIC BLOOD PRESSURE: 73 MMHG | RESPIRATION RATE: 18 BRPM | OXYGEN SATURATION: 99 % | BODY MASS INDEX: 25.31 KG/M2 | WEIGHT: 151.9 LBS | HEIGHT: 65 IN

## 2024-05-20 VITALS
HEART RATE: 78 BPM | SYSTOLIC BLOOD PRESSURE: 119 MMHG | OXYGEN SATURATION: 98 % | HEIGHT: 65 IN | WEIGHT: 152.78 LBS | DIASTOLIC BLOOD PRESSURE: 66 MMHG | TEMPERATURE: 98.4 F | RESPIRATION RATE: 20 BRPM | BODY MASS INDEX: 25.45 KG/M2

## 2024-05-20 DIAGNOSIS — C91.Z0 B LYMPHOBLASTIC LEUKEMIA WITH T(9;22)(Q34;Q11.2);BCR-ABL1 (HCC): ICD-10-CM

## 2024-05-20 DIAGNOSIS — Z51.11 ENCOUNTER FOR CHEMOTHERAPY MANAGEMENT: ICD-10-CM

## 2024-05-20 DIAGNOSIS — C91.01 ACUTE LYMPHOBLASTIC LEUKEMIA (ALL) IN REMISSION (HCC): ICD-10-CM

## 2024-05-20 LAB
ALBUMIN SERPL BCP-MCNC: 4.4 G/DL (ref 3.2–4.9)
ALBUMIN/GLOB SERPL: 2 G/DL
ALP SERPL-CCNC: 85 U/L (ref 30–99)
ALT SERPL-CCNC: 53 U/L (ref 2–50)
ANION GAP SERPL CALC-SCNC: 11 MMOL/L (ref 7–16)
AST SERPL-CCNC: 27 U/L (ref 12–45)
BASOPHILS # BLD AUTO: 0.7 % (ref 0–1.8)
BASOPHILS # BLD: 0.02 K/UL (ref 0–0.12)
BILIRUB CONJ SERPL-MCNC: <0.2 MG/DL (ref 0.1–0.5)
BILIRUB INDIRECT SERPL-MCNC: NORMAL MG/DL (ref 0–1)
BILIRUB SERPL-MCNC: 0.4 MG/DL (ref 0.1–1.5)
BUN SERPL-MCNC: 12 MG/DL (ref 8–22)
CALCIUM ALBUM COR SERPL-MCNC: 8.4 MG/DL (ref 8.5–10.5)
CALCIUM SERPL-MCNC: 8.7 MG/DL (ref 8.5–10.5)
CHLORIDE SERPL-SCNC: 108 MMOL/L (ref 96–112)
CO2 SERPL-SCNC: 21 MMOL/L (ref 20–33)
CREAT SERPL-MCNC: 0.58 MG/DL (ref 0.5–1.4)
EOSINOPHIL # BLD AUTO: 0.07 K/UL (ref 0–0.51)
EOSINOPHIL NFR BLD: 2.3 % (ref 0–6.9)
ERYTHROCYTE [DISTWIDTH] IN BLOOD BY AUTOMATED COUNT: 58.5 FL (ref 35.9–50)
GFR SERPLBLD CREATININE-BSD FMLA CKD-EPI: 141 ML/MIN/1.73 M 2
GLOBULIN SER CALC-MCNC: 2.2 G/DL (ref 1.9–3.5)
GLUCOSE SERPL-MCNC: 89 MG/DL (ref 65–99)
HCT VFR BLD AUTO: 38.6 % (ref 42–52)
HGB BLD-MCNC: 13.2 G/DL (ref 14–18)
IMM GRANULOCYTES # BLD AUTO: 0.03 K/UL (ref 0–0.11)
IMM GRANULOCYTES NFR BLD AUTO: 1 % (ref 0–0.9)
LYMPHOCYTES # BLD AUTO: 0.18 K/UL (ref 1–4.8)
LYMPHOCYTES NFR BLD: 5.9 % (ref 22–41)
MCH RBC QN AUTO: 34.9 PG (ref 27–33)
MCHC RBC AUTO-ENTMCNC: 34.2 G/DL (ref 32.3–36.5)
MCV RBC AUTO: 102.1 FL (ref 81.4–97.8)
MONOCYTES # BLD AUTO: 0.37 K/UL (ref 0–0.85)
MONOCYTES NFR BLD AUTO: 12.2 % (ref 0–13.4)
NEUTROPHILS # BLD AUTO: 2.36 K/UL (ref 1.82–7.42)
NEUTROPHILS NFR BLD: 77.9 % (ref 44–72)
NRBC # BLD AUTO: 0 K/UL
NRBC BLD-RTO: 0 /100 WBC (ref 0–0.2)
PLATELET # BLD AUTO: 171 K/UL (ref 164–446)
PMV BLD AUTO: 9.2 FL (ref 9–12.9)
POTASSIUM SERPL-SCNC: 4 MMOL/L (ref 3.6–5.5)
PROT SERPL-MCNC: 6.6 G/DL (ref 6–8.2)
RBC # BLD AUTO: 3.78 M/UL (ref 4.7–6.1)
SODIUM SERPL-SCNC: 140 MMOL/L (ref 135–145)
WBC # BLD AUTO: 3 K/UL (ref 4.8–10.8)

## 2024-05-20 PROCEDURE — 80053 COMPREHEN METABOLIC PANEL: CPT

## 2024-05-20 PROCEDURE — 160002 HCHG RECOVERY MINUTES (STAT): Performed by: SURGERY

## 2024-05-20 PROCEDURE — 160025 RECOVERY II MINUTES (STATS): Performed by: SURGERY

## 2024-05-20 PROCEDURE — 85025 COMPLETE CBC W/AUTO DIFF WBC: CPT

## 2024-05-20 PROCEDURE — 700111 HCHG RX REV CODE 636 W/ 250 OVERRIDE (IP): Mod: UD | Performed by: PEDIATRICS

## 2024-05-20 PROCEDURE — 99214 OFFICE O/P EST MOD 30 MIN: CPT | Performed by: PEDIATRICS

## 2024-05-20 PROCEDURE — 82248 BILIRUBIN DIRECT: CPT

## 2024-05-20 PROCEDURE — 96409 CHEMO IV PUSH SNGL DRUG: CPT

## 2024-05-20 PROCEDURE — 160035 HCHG PACU - 1ST 60 MINS PHASE I: Performed by: SURGERY

## 2024-05-20 PROCEDURE — 700111 HCHG RX REV CODE 636 W/ 250 OVERRIDE (IP): Mod: UD | Performed by: SURGERY

## 2024-05-20 PROCEDURE — 36591 DRAW BLOOD OFF VENOUS DEVICE: CPT

## 2024-05-20 PROCEDURE — 160027 HCHG SURGERY MINUTES - 1ST 30 MINS LEVEL 2: Performed by: SURGERY

## 2024-05-20 PROCEDURE — 160046 HCHG PACU - 1ST 60 MINS PHASE II: Performed by: SURGERY

## 2024-05-20 PROCEDURE — A4212 NON CORING NEEDLE OR STYLET: HCPCS

## 2024-05-20 PROCEDURE — 160009 HCHG ANES TIME/MIN: Performed by: SURGERY

## 2024-05-20 PROCEDURE — 96375 TX/PRO/DX INJ NEW DRUG ADDON: CPT | Mod: XU

## 2024-05-20 PROCEDURE — 160048 HCHG OR STATISTICAL LEVEL 1-5: Performed by: SURGERY

## 2024-05-20 PROCEDURE — 700105 HCHG RX REV CODE 258: Mod: UD | Performed by: PEDIATRICS

## 2024-05-20 RX ORDER — ONDANSETRON 2 MG/ML
8 INJECTION INTRAMUSCULAR; INTRAVENOUS ONCE
Status: COMPLETED | OUTPATIENT
Start: 2024-05-20 | End: 2024-05-20

## 2024-05-20 RX ORDER — MIDAZOLAM HYDROCHLORIDE 1 MG/ML
INJECTION INTRAMUSCULAR; INTRAVENOUS PRN
Status: DISCONTINUED | OUTPATIENT
Start: 2024-05-20 | End: 2024-05-20 | Stop reason: SURG

## 2024-05-20 RX ORDER — KETOROLAC TROMETHAMINE 15 MG/ML
INJECTION, SOLUTION INTRAMUSCULAR; INTRAVENOUS PRN
Status: DISCONTINUED | OUTPATIENT
Start: 2024-05-20 | End: 2024-05-20 | Stop reason: SURG

## 2024-05-20 RX ORDER — OMEPRAZOLE 20 MG/1
20 CAPSULE, DELAYED RELEASE ORAL DAILY
COMMUNITY

## 2024-05-20 RX ORDER — DIPHENHYDRAMINE HYDROCHLORIDE 50 MG/ML
12.5 INJECTION INTRAMUSCULAR; INTRAVENOUS
Status: DISCONTINUED | OUTPATIENT
Start: 2024-05-20 | End: 2024-05-20 | Stop reason: HOSPADM

## 2024-05-20 RX ORDER — HYDROMORPHONE HYDROCHLORIDE 1 MG/ML
0.2 INJECTION, SOLUTION INTRAMUSCULAR; INTRAVENOUS; SUBCUTANEOUS
Status: DISCONTINUED | OUTPATIENT
Start: 2024-05-20 | End: 2024-05-20 | Stop reason: HOSPADM

## 2024-05-20 RX ORDER — BUPIVACAINE HYDROCHLORIDE 2.5 MG/ML
INJECTION, SOLUTION EPIDURAL; INFILTRATION; INTRACAUDAL
Status: DISCONTINUED | OUTPATIENT
Start: 2024-05-20 | End: 2024-05-20 | Stop reason: HOSPADM

## 2024-05-20 RX ORDER — OXYCODONE HCL 5 MG/5 ML
5 SOLUTION, ORAL ORAL
Status: DISCONTINUED | OUTPATIENT
Start: 2024-05-20 | End: 2024-05-20 | Stop reason: HOSPADM

## 2024-05-20 RX ORDER — HEPARIN SODIUM,PORCINE 10 UNIT/ML
30 VIAL (ML) INTRAVENOUS PRN
Start: 2024-05-20

## 2024-05-20 RX ORDER — HALOPERIDOL 5 MG/ML
1 INJECTION INTRAMUSCULAR
Status: DISCONTINUED | OUTPATIENT
Start: 2024-05-20 | End: 2024-05-20 | Stop reason: HOSPADM

## 2024-05-20 RX ORDER — 0.9 % SODIUM CHLORIDE 0.9 %
20 VIAL (ML) INJECTION PRN
OUTPATIENT
Start: 2024-05-20

## 2024-05-20 RX ORDER — HYDROMORPHONE HYDROCHLORIDE 1 MG/ML
0.5 INJECTION, SOLUTION INTRAMUSCULAR; INTRAVENOUS; SUBCUTANEOUS
Status: DISCONTINUED | OUTPATIENT
Start: 2024-05-20 | End: 2024-05-20 | Stop reason: HOSPADM

## 2024-05-20 RX ORDER — MEPERIDINE HYDROCHLORIDE 25 MG/ML
12.5 INJECTION INTRAMUSCULAR; INTRAVENOUS; SUBCUTANEOUS
Status: DISCONTINUED | OUTPATIENT
Start: 2024-05-20 | End: 2024-05-20 | Stop reason: HOSPADM

## 2024-05-20 RX ORDER — HYDROMORPHONE HYDROCHLORIDE 1 MG/ML
0.4 INJECTION, SOLUTION INTRAMUSCULAR; INTRAVENOUS; SUBCUTANEOUS
Status: DISCONTINUED | OUTPATIENT
Start: 2024-05-20 | End: 2024-05-20 | Stop reason: HOSPADM

## 2024-05-20 RX ORDER — EPHEDRINE SULFATE 50 MG/ML
5 INJECTION, SOLUTION INTRAVENOUS
Status: DISCONTINUED | OUTPATIENT
Start: 2024-05-20 | End: 2024-05-20 | Stop reason: HOSPADM

## 2024-05-20 RX ORDER — CEFAZOLIN SODIUM 1 G/3ML
INJECTION, POWDER, FOR SOLUTION INTRAMUSCULAR; INTRAVENOUS PRN
Status: DISCONTINUED | OUTPATIENT
Start: 2024-05-20 | End: 2024-05-20 | Stop reason: SURG

## 2024-05-20 RX ORDER — OXYCODONE HCL 5 MG/5 ML
10 SOLUTION, ORAL ORAL
Status: DISCONTINUED | OUTPATIENT
Start: 2024-05-20 | End: 2024-05-20 | Stop reason: HOSPADM

## 2024-05-20 RX ORDER — DEXAMETHASONE SODIUM PHOSPHATE 4 MG/ML
INJECTION, SOLUTION INTRA-ARTICULAR; INTRALESIONAL; INTRAMUSCULAR; INTRAVENOUS; SOFT TISSUE PRN
Status: DISCONTINUED | OUTPATIENT
Start: 2024-05-20 | End: 2024-05-20 | Stop reason: SURG

## 2024-05-20 RX ORDER — HYDRALAZINE HYDROCHLORIDE 20 MG/ML
5 INJECTION INTRAMUSCULAR; INTRAVENOUS
Status: DISCONTINUED | OUTPATIENT
Start: 2024-05-20 | End: 2024-05-20 | Stop reason: HOSPADM

## 2024-05-20 RX ORDER — SODIUM CHLORIDE 9 MG/ML
INJECTION, SOLUTION INTRAVENOUS CONTINUOUS
Status: DISCONTINUED | OUTPATIENT
Start: 2024-05-20 | End: 2024-05-20 | Stop reason: HOSPADM

## 2024-05-20 RX ORDER — SODIUM CHLORIDE, SODIUM LACTATE, POTASSIUM CHLORIDE, CALCIUM CHLORIDE 600; 310; 30; 20 MG/100ML; MG/100ML; MG/100ML; MG/100ML
INJECTION, SOLUTION INTRAVENOUS CONTINUOUS
Status: DISCONTINUED | OUTPATIENT
Start: 2024-05-20 | End: 2024-05-20 | Stop reason: HOSPADM

## 2024-05-20 RX ORDER — ONDANSETRON 2 MG/ML
INJECTION INTRAMUSCULAR; INTRAVENOUS PRN
Status: DISCONTINUED | OUTPATIENT
Start: 2024-05-20 | End: 2024-05-20 | Stop reason: SURG

## 2024-05-20 RX ORDER — LIDOCAINE HYDROCHLORIDE 20 MG/ML
INJECTION, SOLUTION EPIDURAL; INFILTRATION; INTRACAUDAL; PERINEURAL PRN
Status: DISCONTINUED | OUTPATIENT
Start: 2024-05-20 | End: 2024-05-20 | Stop reason: SURG

## 2024-05-20 RX ADMIN — ONDANSETRON 8 MG: 2 INJECTION INTRAMUSCULAR; INTRAVENOUS at 11:39

## 2024-05-20 RX ADMIN — FENTANYL CITRATE 50 MCG: 50 INJECTION, SOLUTION INTRAMUSCULAR; INTRAVENOUS at 14:25

## 2024-05-20 RX ADMIN — LIDOCAINE HYDROCHLORIDE 80 MG: 20 INJECTION, SOLUTION EPIDURAL; INFILTRATION; INTRACAUDAL at 14:25

## 2024-05-20 RX ADMIN — CEFAZOLIN 2 G: 1 INJECTION, POWDER, FOR SOLUTION INTRAMUSCULAR; INTRAVENOUS at 14:26

## 2024-05-20 RX ADMIN — MIDAZOLAM HYDROCHLORIDE 2 MG: 1 INJECTION, SOLUTION INTRAMUSCULAR; INTRAVENOUS at 14:21

## 2024-05-20 RX ADMIN — KETOROLAC TROMETHAMINE 15 MG: 15 INJECTION, SOLUTION INTRAMUSCULAR; INTRAVENOUS at 14:33

## 2024-05-20 RX ADMIN — PROPOFOL 120 MG: 10 INJECTION, EMULSION INTRAVENOUS at 14:25

## 2024-05-20 RX ADMIN — DEXAMETHASONE SODIUM PHOSPHATE 4 MG: 4 INJECTION INTRA-ARTICULAR; INTRALESIONAL; INTRAMUSCULAR; INTRAVENOUS; SOFT TISSUE at 14:26

## 2024-05-20 RX ADMIN — VINCRISTINE SULFATE 2 MG: 1 INJECTION, SOLUTION INTRAVENOUS at 11:50

## 2024-05-20 RX ADMIN — HEPARIN 500 UNITS: 100 SYRINGE at 11:56

## 2024-05-20 RX ADMIN — SODIUM CHLORIDE, POTASSIUM CHLORIDE, SODIUM LACTATE AND CALCIUM CHLORIDE: 600; 310; 30; 20 INJECTION, SOLUTION INTRAVENOUS at 14:21

## 2024-05-20 RX ADMIN — ONDANSETRON 4 MG: 2 INJECTION INTRAMUSCULAR; INTRAVENOUS at 14:33

## 2024-05-20 ASSESSMENT — FIBROSIS 4 INDEX
FIB4 SCORE: 0.55
FIB4 SCORE: 0.51

## 2024-05-20 NOTE — DISCHARGE INSTRUCTIONS
HOME CARE INSTRUCTIONS    ACTIVITY: Rest and take it easy for the first 24 hours.  A responsible adult is recommended to remain with you during that time.  It is normal to feel sleepy.  We encourage you to not do anything that requires balance, judgment or coordination.    FOR 24 HOURS DO NOT:  Drive, operate machinery or run household appliances.  Drink beer or alcoholic beverages.  Make important decisions or sign legal documents.    SPECIAL INSTRUCTIONS:   Dressing can be removed 5/22.    Implanted Port Removal, Care After  The following information offers guidance on how to care for yourself after your procedure. Your health care provider may also give you more specific instructions. If you have problems or questions, contact your health care provider.  What can I expect after the procedure?  After the procedure, it is common to have:  Soreness or pain near your incision.  Some swelling or bruising near your incision.  Follow these instructions at home:  Medicines  Take over-the-counter and prescription medicines only as told by your health care provider.  If you were prescribed an antibiotic medicine, take it as told by your health care provider. Do not stop taking the antibiotic even if you start to feel better.  Bathing  Do not take baths, swim, or use a hot tub until your health care provider approves.  Ask your health care provider if you can take showers. You may only be allowed to take sponge baths.  Incision care  Follow instructions from your health care provider about how to take care of your incision. Make sure you:  Wash your hands with soap and water for at least 20 seconds before and after you change your bandage (dressing). If soap and water are not available, use hand .  Change your dressing as told by your health care provider.  Keep your dressing dry.  Leave stitches (sutures), skin glue, or adhesive strips in place. These skin closures may need to stay in place for 2 weeks or longer.  If adhesive strip edges start to loosen and curl up, you may trim the loose edges. Do not remove adhesive strips completely unless your health care provider tells you to do that.  Check your incision area every day for signs of infection. Check for:  More redness, swelling, or pain.  More fluid or blood.  Warmth.  Pus or a bad smell.  Activity  Return to your normal activities as told by your health care provider. Ask your health care provider what activities are safe for you.  You may have to avoid lifting. Ask your health care provider how much you can safely lift.  Do not do activities that involve lifting your arms over your head.  Driving  If you were given a sedative during the procedure, it can affect you for several hours. Do not drive or operate machinery until your health care provider says that it is safe.  If you did not receive a sedative, ask your health care provider when it is safe to drive.  General instructions  Do not use any products that contain nicotine or tobacco. These products include cigarettes, chewing tobacco, and vaping devices, such as e-cigarettes. These can delay healing after surgery. If you need help quitting, ask your health care provider.  Keep all follow-up visits. This is important.  Contact a health care provider if:  You have a fever or chills.  You have more redness, swelling, or pain around your incision.  You have more fluid or blood coming from your incision.  Your incision feels warm to the touch.  You have pus or a bad smell coming from your incision.  You have pain that is not relieved by your pain medicine.  Get help right away if:  You have chest pain.  You have difficulty breathing.  These symptoms may be an emergency. Get help right away. Call 911.  Do not wait to see if the symptoms will go away.  Do not drive yourself to the hospital.  Summary  After the procedure, it is common to have pain, soreness, swelling, or bruising near your incision.  If you were  prescribed an antibiotic medicine, take it as told by your health care provider. Do not stop taking the antibiotic even if you start to feel better.  If you were given a sedative during the procedure, it can affect you for several hours. Do not drive or operate machinery until your health care provider says that it is safe.  Return to your normal activities as told by your health care provider. Ask your health care provider what activities are safe for you.  This information is not intended to replace advice given to you by your health care provider. Make sure you discuss any questions you have with your health care provider.        DIET: To avoid nausea, slowly advance diet as tolerated, avoiding spicy or greasy foods for the first day.  Add more substantial food to your diet according to your physician's instructions. INCREASE FLUIDS AND FIBER TO AVOID CONSTIPATION.    SURGICAL DRESSING/BATHING:   No baths/soaking in water until cleared by .    MEDICATIONS: Resume taking daily medication.  Take prescribed pain medication with food.  If no medication is prescribed, you may take non-aspirin pain medication if needed.  PAIN MEDICATION CAN BE VERY CONSTIPATING.  Take a stool softener or laxative such as senokot, pericolace, or milk of magnesia if needed.    A follow-up appointment should be arranged with your doctor; call to schedule.    You should CALL YOUR PHYSICIAN if you develop:  Fever greater than 101 degrees F.  Pain not relieved by medication, or persistent nausea or vomiting.  Excessive bleeding (blood soaking through dressing) or unexpected drainage from the wound.  Extreme redness or swelling around the incision site, drainage of pus or foul smelling drainage.  Inability to urinate or empty your bladder within 8 hours.  Problems with breathing or chest pain.    You should call 911 if you develop problems with breathing or chest pain.  If you are unable to contact your doctor or surgical center, you should  go to the nearest emergency room or urgent care center.  Physician's telephone #: [phone]    MILD FLU-LIKE SYMPTOMS ARE NORMAL.  YOU MAY EXPERIENCE GENERALIZED MUSCLE ACHES, THROAT IRRITATION, HEADACHE AND/OR SOME NAUSEA.    If any questions arise, call your doctor.  If your doctor is not available, please feel free to call the Surgical Center at [phone].  The Center is open Monday through Friday from 7AM to 7PM.      A registered nurse may call you a few days after your surgery to see how you are doing after your procedure.    You may also receive a survey in the mail within the next two weeks and we ask that you take a few moments to complete the survey and return it to us.  Our goal is to provide you with very good care and we value your comments.     Depression / Suicide Risk    As you are discharged from this [facility] facility, it is important to learn how to keep safe from harming yourself.    Recognize the warning signs:  Abrupt changes in personality, positive or negative- including increase in energy   Giving away possessions  Change in eating patterns- significant weight changes-  positive or negative  Change in sleeping patterns- unable to sleep or sleeping all the time   Unwillingness or inability to communicate  Depression  Unusual sadness, discouragement and loneliness  Talk of wanting to die  Neglect of personal appearance   Rebelliousness- reckless behavior  Withdrawal from people/activities they love  Confusion- inability to concentrate     If you or a loved one observes any of these behaviors or has concerns about self-harm, here's what you can do:  Talk about it- your feelings and reasons for harming yourself  Remove any means that you might use to hurt yourself (examples: pills, rope, extension cords, firearm)  Get professional help from the community (Mental Health, Substance Abuse, psychological counseling)  Do not be alone:Call your Safe Contact- someone whom you trust who  will be there for you.  Call your local CRISIS HOTLINE 172-9411 or 838-195-9172  Call your local Children's Mobile Crisis Response Team [place] (956) 980-2671 or www.[employer].com  Call the toll free National Suicide Prevention Hotlines   National Suicide Prevention Lifeline 590-481-YUDX (3651)  [facility] Line Network 800-BZNIAWR (917-6369)    I acknowledge receipt and understanding of these Home Care instructions.

## 2024-05-20 NOTE — PROGRESS NOTES
Report called to Phase II. Patient to Phase II with RN in stable condition. VSS. Surgical dressing clean dry intact. Aox4 and on RA. No belongings. Family updated. No further needs.

## 2024-05-20 NOTE — PROGRESS NOTES
"Pharmacy Chemotherapy Verification    Patient Name: Tomas Jean-Baptiste  Dx: pH+ B-Cell ALL         Protocol: Maintenance C3 and subsequent cycles(On Study Arm B, ID number: [id number])         Allergies: Amoxicillin       Pulse 92   Temp 36.3 °C (97.4 °F) (Temporal)   Resp 20   Ht 1.66 m (5' 5.35\")   Wt 69.3 kg (152 lb 12.5 oz)   SpO2 97%   BMI 25.15 kg/m²    Body surface area is 1.79 meters squared.    All lab results 5/20/24 within treatment parameters.     Drug Order   (Drug name, dose, route, IV Fluid & volume, frequency, number of doses) Maintenance, Cycle 5, Day 29  Previous treatment: Maintenance, C51 4/22/24   Medication = Vincristine (ONCOVIN)   Base Dose= 1.5 mg/m2  Calc Dose: Base Dose x 1.79 m2 = >2mg  Final Dose = 2 mg  Route = IV  Fluid & Volume = NS 25 mL  Admin Duration = Over 5 - 10 minutes    Days 1, 29, 57      Treat with MAX dose    Medication = Methotrexate PF  Base Dose = 12 mg fixed dose  Calc Dose: n/a  Final Dose = ---mg   Route = INTRATHECAL  Fluid & Volume = pf NS 6 mL  Administration by MD Only  Day 1   No calculation required.   okay to treat with final dose   By my signature below, I confirm this process was performed independently with the BSA and all final chemotherapy dosing calculations congruent. I have reviewed the above chemotherapy order and that my calculation of the final dose and BSA (when applicable) corroborate those calculations of the  pharmacist. Discrepancies of 10% or greater in the written dose have been addressed and documented within the [facility] Progress notes.    Xochilt Ralph.D.      "

## 2024-05-20 NOTE — PROGRESS NOTES
Med Rec complete per PT   Allergies Reviewed    Pt reports NOT taking anticoagulant in the last 14 days    Pt received chemo infusions today 5/20

## 2024-05-20 NOTE — PROGRESS NOTES
"Pharmacy Chemotherapy Calculations Note:    Dx: B-ALL (CNS3)  Cycle: 5 Maintenance Day 29   Previous treatment: Maint C5D1 on 4/2/24     Protocol: Maintenance per Arm B of [id number] [facility] ID number: [id number]      Pulse 92   Temp 36.3 °C (97.4 °F) (Temporal)   Resp 20   Ht 1.66 m (5' 5.35\")   Wt 69.3 kg (152 lb 12.5 oz)   SpO2 97%   BMI 25.15 kg/m²  Body surface area is 1.79 meters squared.    No hold parameters per MD    Vincristine 1.5 mg/m² (max 2 mg) x 1.79 m² = 2.68 mg   ok for max final dose = 2 mg IV      Cherrie Decker, PharmD, BCOP                "

## 2024-05-20 NOTE — ANESTHESIA POSTPROCEDURE EVALUATION
Patient: Tomas Jean-Baptiste    Procedure Summary       Date: 05/20/24 Room / Location: [facility] 11 / SURGERY [facility]    Anesthesia Start: 1421 Anesthesia Stop: 1445    Procedure: PORT REMOVAL (Chest) Diagnosis: (B-CELL ACUTE LYMPHOBLASTIC LEUKEMIA)    Surgeons: Simona Moise M.D. Responsible Provider: Siddhartha Hennessy M.D.    Anesthesia Type: general ASA Status: 3            Final Anesthesia Type: general  Last vitals  BP   Blood Pressure: 98/56    Temp   36.2 °C (97.2 °F)    Pulse   74   Resp   14    SpO2   100 %      Anesthesia Post Evaluation    Patient location during evaluation: PACU  Patient participation: complete - patient participated  Level of consciousness: sleepy but conscious    Airway patency: patent  Anesthetic complications: no  Cardiovascular status: hemodynamically stable  Respiratory status: acceptable  Hydration status: balanced    PONV: none          No notable events documented.     Nurse Pain Score: 0 (NPRS)

## 2024-05-20 NOTE — OR NURSING
VSS, pt steady with ambulation, meets discharge criteria. Discharged home with family. Ambulated to the lobby with hospital escort. Char CDI. IV dc'd, cathlon intact. Pt to f/u with physician as directed by physician. Pt to return to ER for any emergent sx. Pt verbalizes understanding of discharge instructions and all questions were answered.

## 2024-05-20 NOTE — ANESTHESIA TIME REPORT
Anesthesia Start and Stop Event Times       Date Time Event    5/20/2024 1321 Ready for Procedure     1421 Anesthesia Start     1445 Anesthesia Stop          Responsible Staff  05/20/24      Name Role Begin End    Siddhartha Hennessy M.D. Anesth 1421 1445          Overtime Reason:  no overtime (within assigned shift)    Comments:

## 2024-05-20 NOTE — OP REPORT
DATE OF SERVICE:  05/20/2024     PREOPERATIVE DIAGNOSIS:  History of leukemia.     POSTOPERATIVE DIAGNOSIS:  History of leukemia.     PROCEDURE:  Port removal.     SURGEON:  Simona Jensen MD     ASSISTANT:  MAISHA Mars     ANESTHESIA:  Laryngeal mask.     ANESTHESIOLOGIST:  Siddhartha Hennessy MD     INDICATIONS:  The patient is a 23-year-old male who has a history of leukemia,   who no longer is as currently in remission, is in no longer in need of   access.  He is being brought to the operating room at this time for port   removal.     FINDINGS:  Port was removed in its entirety.     DESCRIPTION OF PROCEDURE:  The patient was identified and consented, he was   brought to the operating room and placed in supine position.  The patient   underwent laryngeal mask anesthetic clearance.  The patient's chest was   prepped and draped in sterile fashion.  Elliptical incision was made around   the old scar using electrocautery, subcutaneous tissue was dissected down.    The port was removed.  Pressure was placed in the infraclavicular area for   approximately 3-4 minutes.  Port site was closed with 3-0 Vicryl subcutaneous   layer and skin was closed with 4-0 Vicryls.  Op-Site dressing was placed.  It   was anesthetized with 0.25% Marcaine.  The patient was extubated and taken to   recovery in stable condition.  All sponge and needle counts were correct.        ______________________________  SIMONA JENSEN MD    CATHY/MARYAM      DD:  05/20/2024 14:35  DT:  05/20/2024 15:25    Job#:  [id number]    CC:River Rodriguez MD

## 2024-05-20 NOTE — PROGRESS NOTES
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 fatigue.  Blood counts obtained at the time demonstrated a WBC of 0.4, Hgb 8.8 and platelets 125.  ANC at the time was measured at only 170.  JULY was otherwise clinically well appearing.  He did receive a one-time normal saline bolus for his nausea and vomiting and was sent home with instructions to HOLD his oral mercaptopurine and oral methotrexate which began being held on 7/28/2023.  Per protocol, imatinib was continued at previous dose of 600 mg in the morning. Tomas Roy would return to clinic again on 8/2/2023 and 8/7/2023.  On both occasions, ANC remained under 500 and oral therapy (with the exception of imatinib) continue to be held while awaiting count recovery.  Ultimately, on 8/11/2023 after 14 days of therapy being held, Tomas Roy returned to clinic and WBC had increased to 2.1 with ANC increasing to  1500 with an absolute monocyte count of 290. Tomas Roy was then instructed to resume his oral chemotherapy at 100% of prior dosing with (due to timing with Day 1 of Cycle 2 with LP 3 days later, no weekly MTX was administered).  Imatinib was never held.  On 8/14/2023 he was seen in clinic for Day 1 of Cycle 2 of Maintenance with lumbar puncture, vincristine, and 5-day pulse of steroids.  At the time, his ANC was 2010 and his oral chemotherapy was continued at 100% dosing.  Given his history of previously drop in counts, he was scheduled to come back for repeat labs on 8/29/2023 at which point his WBC count was 1.4 and his ANC remained greater than 500 at 1140.  Again, his therapy was continued with imatinib 550 mg by mouth in the morning as well as 100% dosing of methotrexate and 100% dosing of 6-mercaptopurine.  When Tomas Roy returned to clinic on 9/11/2023 for his Maintenance, Cycle 2, Day 29 therapy he was noted to have had another drop in his WBC to 600 and his ANC accordingly was also decreased at 410.  He did receive vincristine in accordance with protocol as well as starting a 5-day pulse of prednisone per protocol.  Given however that his ANC had dropped below 500 his oral methotrexate and oral 6-MP were again held.  He was instructed return to clinic 1 week later for lab evaluations.  On 9/19/2023 he returned to clinic and WBC had improved slightly to 0.8 however ANC remained low at 260 with an absolute monocyte count of 220.  Given that counts not recovered his oral chemotherapy remained held for an additional week.  On 9/26/2023 at 14 days after initially holding chemotherapy, he was seen back in clinic at which time WBC improved again to 1.1 however ANC did not quite recover and remained at 490 with an absolute monocyte count of 330.  Given that 2 weeks had elapsed with myelosuppression, imatinib was held in addition to oral 6-MP and methotrexate. Tomas Roy was instructed to return  to clinic on 9/29/2023 for repeat counts.  On 9/29/2023 WBC had improved to 2.4 and ANC had finally recovered with 1530 and an absolute monocyte count of 510.  Given a recovery with ANC greater than 750 and platelets greater than 75, oral chemotherapy was restarted at 50% of initial dosing and imatinib was restarted at 100% of initial dosing.  On 10/9/2023, Tomas Roy returned to clinic for his Day 57 of Cycle 2 visit.  At the time of his visit, his ANC had recovered after holding chemotherapy and then restarting at 50% dosing 11 days prior.  He did however in clinic spiked a temperature 102.5 °F.  For this reason despite his ANC being improved, no dose adjustments were made in his chemotherapy.  He continued with 50% dosing with 100% adherence of his 6-MP and methotrexate as well as his imatinib at 550 mg PO QHS.   Tomas Roy was then seen in clinic again on 11/6/2023 for his Day 1 of Cycle 3 of Maintenance therapy.  At the time, his imatinib dose was adjusted back up to 600 mg daily taken in the morning.  Additionally, his methotrexate was adjusted back up to 75% of expected dosing and his mercaptopurine was increased to 75% of expected dosing as well.  He will return to clinic on 12/4/2023 for his Day 29 of Cycle 3 therapy.  At the time, his ANC was exactly 1500 and therefore no dose adjustments were made and he continued at 75% dosing for oral chemotherapy as well as the imatinib dose of 600 mg daily.  On 1/2/2024 he was seen for his Day 57 evaluations and his ANC had dropped to 1450 again not prompting any change in oral chemotherapy dosing.  Tomas Roy completed his Cycle 4 chemotherapy without any adverse events or complications.  He did not have any changes in medications either.  Most recently,  Tomas Roy was seen in clinic on 4/22/2023 for his Day 1 of Cycle 5 chemotherapy.  At the time, a lumbar puncture was performed and IT chemotherapy was provided.  He tolerated the procedure  and the therapy well without any sequelae.  His ANC at the time was > 1500 however the dose adjustment was made as this was the first ANC greater than 1500 and Tomas Roy had a history of precipitous drops in his counts with increases in his oral chemotherapy.  Today, Tomas Roy presents to clinic for his Cycle 5, Day 29 therapy and evaluations.  Interval history is unremarkable per report.    On presentation, Tomas Roy is clinically well without any recent or remote illness.  Energy and activity are good.  No complaints of any headaches, changes in vision.  Does complain of mild neuropathy in his feet which is unchanged.  No complaints of any motor or musculoskeletal complaints.  Not complaining of any other pain.  Tomas Roy  has not had any cough, congestion or respiratory complaints.  He does not have any complaints of shortness of breath.  Appetite and oral intake are good.  No complaints of any nausea, vomiting, diarrhea or constipation.  No abdominal pain or discomfort. Tomas Roy has not had any skin changes or rashes.   Tomas Roy reports that he has been 100% adherent with his oral chemotherapy to include imatinib 600 mg every morning, 10.5 tablets of methotrexate each week on Mondays and mercaptopurine 2 tablets daily x 7.  There are no other concerns or complaints at this time.    Review of Systems:     Constitutional:  Afebrile. No remote or acute illness.  Energy and activity are at baseline.    HENT: Negative for auditory changes, nosebleeds and sore throat.  No mouth sores.  Eyes: Negative for visual changes.  Respiratory: Negative for shortness of breath no cough.  Cardiovascular: Negative.  Gastrointestinal: Negative for nausea, vomiting, abdominal pain, diarrhea, constipation.  Genitourinary: Negative.  Musculoskeletal: Negative for joint or muscle pain.  Skin: Negative for rash, signs of infection.  Neurological: Negative for numbness, tingling, sensory  changes, weakness or headaches.  Does complain however of persistent and mild peripheral neuropathy in feet.  Endo/Heme/Allergies: Does not bruise/bleed easily.    Psychiatric/Behavioral: No changes in mood, appropriate for age.     PAST MEDICAL HISTORY:     Oncologic History:  2-3 week history of worsening fatigue, right lower extremity pain  Presentation to OSH and diagnosed with right LE superficial thrombus, subsegmental PE and hyperleukocytosis, anemia and thrombocytopenia  Transferred to [facility] for definitive care  Presenting (local) WBC > 440K, Hgb 10.0, platelets 53, (automated differential ANC 3190, ALC 75,310, absolute monocyte count 36345, absolute blast count 340,560)  Uric Acid 15.6, phosphorus 5.6, LDH 1114  Rasburicase x 1 dose given   Peripheral Blood flow cytometry demonstrating CD10 pos, CD19 pos, CD20 neg, CD22 dim (60%) 5/28/2022  Peripheral blood FISH for BCR-ABL1 positive in 98% of analyzed cells     Age at Diagnosis: 21 years  White Blood Cell Count at Presentation: > 440 k/uL  Testicular Disease Status: Negative (see procedure note 5/30/2022)  CNS Status: 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, diagnostic LP with WBC 1, RBC 3 and no evidence of leukemic blasts 5/30/2022  Steroid Pre-treatment: None  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     All inclusion/exclusion criteria for [id number] met and consent signed - enrolled 5/29/2022   All inclusion/exclusion criteria for [id number] met and consent signed - enrolled 5/30/2022  Confirmatory bone marrow aspirate and biopsy and diagnostic LP + cytarabine 70 mg IT 5/30/2022  Induction therapy (ON STUDY [id number]) started 5/30/2022  Bone marrow immunohistochemistry consistent with diagnosis of B-ALL comprising 90% of marrow cellularity  B[facility] COG purposes:  Flow cytom  Of the blood pressure little high that is a problem is a cultural problem is  well and cultural genetic and everything else like that unfortunately breathalyzers such bad heart disease diabetes things like that  populations etry consistent with peripheral blood, cytogenetics remarkable for known t(9;22)  CSF with WBC 1, RBC 3, no blasts identified on cytospin  FISH results available 5/31/2022 making patient eligible for transfer from [place] [address] to [place] [address] as eligibility requirements were met for [place] [address]  Patient unenrolled from [place] [address] (BCR-ABL1 fusion positive) 6/1/2022  Consent signed for [place] [address] and patient enrolled 6/1/2022 (see eligibility checklist from 5/31/2022 and consent note from 6/1/2022)  Imatinib 400 mg PO QAM / 200 mg PO QPM started 6/3/2022 (allowed per [place] [address])  Patient completed the first 15 days of a Standard 4-drug Induction on 6/13/2022  Start of [place] [address](OS), Induction IA Part 2, Day 15 6/13/2022  End of Induction 1A Part 2 - MRD negative  Start of [place] [address](OS), Induction IA Part 2, Day 15 7/5/2022  Induction IB DELAYED 2 weeks 14 days from 7/26/2022-8/9/2022) for myelosuppression - Start of Day 22 cytarabine block 8/9/2022  Induction IB Day 42 delayed 9 days for myelosuppression - Day 42 evaluations 9/7/2022  End of Induction IB - Flow cytometric MRD negative, MRD by IgH-TCR PCR [id number]%  Randomization to [facility] Arm B of [place] [address]  Start of AR-Experimental Arm B, Interim Maintenance 9/29/2022  Weight based increase in dose of imatinib to 400 mg PO AM and 250 mg PM 9/29/2022  Thrombocytopenia not permissive of proceeding with Day 15 of Interim Maintenance  AR-Experimental Arm B, Interim Maintenance, Day 15 delayed 4 days, start 10/17/2022  AR-Experimental Arm B, Interim Maintenance, Day 29, start 11/1/2022  AR-Experimental Arm B, Interim Maintenance, Day 43, start 11/14/2022  Last does of 6-MP 11/27/2022  AR-Experimental Arm B, Delayed Intensification, Day 1, start 12/6/2022  Admission with Severe Septic Shock 12/27/2022  Imatinib  "HELD 12/27/2022-1/8/2023  AR-Experimental Arm B, Delayed Intensification, Day 29 DELAYED 14 days with start 1/17/2023  Hospitalization 2/7/2023 on Day 50 of Delayed Intensification with left shoulder pain ultimately diagnosed with Bacteroides fragilis infection  AR-Experimental Arm B, Interim Maintenance, Day 1 DELAYED 7 days with start 2/27/2023  AR-Experimental Arm B, Interim Maintenance, Day 11 DELAYED 4 days for platelets 43K on 3/9/2023  AR-Experimental Arm B, Interim Maintenance, Day 11 VCR given and MTX omitted for platelets 43K 3/13/2023  Imatinib HELD 3/30/2023-4/11/2023  AR-Experimental Arm B, Interim Maintenance, Day 21 (DELAYED 22 DAYS) - administered 4/11/2023 with methotrexate 80 mg/m² IV  AR-Experimental Arm B, Interim Maintenance, Day 31 (\"true\" Day 31 4/25/2023) - VCR and IT MTX given 4/28/2023 - IV MTX omitted  AR-Experimental Arm B, Interim Maintenance, Day 41 met with counts - administered 5/5/2023 with methotrexate 80 mg/m² IV     Start of Maintenance, Cycle 1, Day 1 -5/22/2023  Cranial radiation 6/5/2023-6/16/2023 (10 fractions)  Maintenance, Cycle 1, Day 29 - 6/23/2023 (no IT MTX given)  Maintenance, Cycle 1, Day 67, presented with fatigue nausea and vomiting found to have ANC less than 500  Methotrexate (oral) and mercaptopurine held 7/27/2023 - continued with imatinib  Methotrexate (oral) and mercaptopurine held 8/2/2023 - continued with imatinib  Methotrexate (oral) and mercaptopurine held 8/7/2023 - continued with imatinib  Restarted methotrexate (oral) and mercaptopurine at 100% previous dosing on 8/11/2023 - continued with imatinib  Maintenance, Cycle 2, Day 1 - 8/14/2023  Maintenance, Cycle 2, Day 29 - 9/11/2023  Methotrexate (oral) and mercaptopurine held 9/11/2023 - continued with imatinib  Methotrexate (oral) and mercaptopurine held 9/19/2023 - continued with imatinib  Methotrexate (oral) and mercaptopurine held 9/26/2023 - HELD imatinib  Methotrexate (oral) restarted at 50% dosing " and mercaptopurine restarted at 50% dosing 9/29/2023 - RESTARTED imatinib  Maintenance, Cycle 2, Day 57 - 10/9/2023  Maintenance, Cycle 3, Day 1 - 11/6/2023: Methotrexate (oral) increased to 75% dosing and mercaptopurine increased to 75% dosing.  Imatinib increased to 600 mg QAM 11/6/2023  Maintenance Cycle 3, Day 29: 12/4/2023 No changes made to the dosing of oral chemotherapy  Maintenance, Cycle 4, Day 1: No dose adjustments made however ANC slightly greater than >1500.  Oral chemotherapy did not need weight adjustment.  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  3) Hyperleukocytosis  4) Hyperuricemia  5) Hyperphosphatemia  6) Right Lower Extremity Superficial Thrombus  7) Subsegmental Pulmonary Embolism  8) 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  9) Bacteroides fragilis soft tissue infection left shoulder  10) Anxiety/Depression     Past Surgical History:     1) Temporary Right IJ Pharesis Catheter Placement 5/28/2022  2) Right-sided Port-A-Cath placement 8/29/2022  3) IR drainage left calf hematoma  4) Left shoulder I&D  5) Cranial XRT 6/5/2023-6/16/2023     Birth/Developmental History:   1st of three children  Unremarkable pregnancy  Unremarkable delivery     Allergies:             Allergies as of 05/27/2022 - Reviewed 05/27/2022   Allergen Reaction Noted    Amoxicillin   04/03/2020      Social History:   Lives with girlfriend.  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       Family History:     Family History             Family History   Problem Relation Age of Onset    No Known Problems Mother      Diabetes Paternal Grandfather      Hypertension Paternal Grandfather      Hyperlipidemia Paternal Grandfather      Cancer Neg Hx      Heart Disease Neg Hx      Stroke Neg Hx           No significant family history of cancer.  Both maternal and paternal family history of diabetes.     Immunizations:  UTD    Medications:   Current  "Facility-Administered Medications on File Prior to Encounter   Medication Dose Route Frequency Provider Last Rate Last Admin    lidocaine (Xylocaine) 1 % injection 0.5 mL  0.5 mL Intradermal Once PRN Simona Moise M.D.        lactated ringers infusion   Intravenous Continuous Simona Moise M.D.         Current Outpatient Medications on File Prior to Encounter   Medication Sig Dispense Refill    omeprazole (PRILOSEC) 20 MG delayed-release capsule Take 20 mg by mouth every day.      methotrexate 2.5 MG tablet Take 10.5 Tablets by mouth every 7 days for 30 days. 42 Tablet 0    imatinib (GLEEVEC) 400 MG tablet Take 1.5 Tablets by mouth every morning for 120 days. Pt takes 200 mg + 400 mg for a total dose of 600 mg daily (Patient taking differently: Take 600 mg by mouth every morning. Pt takes 200 mg + 400 mg for a total dose of 600 mg daily    CONTINUE TAKING MED PRIOR TO SURGERY) 45 Tablet 3    mercaptopurine (PURINETHOL) 50 MG Tab Take 2 tablets by mouth in the evening x 7 days of the week. (Patient taking differently: Take 2 tablets by mouth in the evening x 7 days of the week.      CONTINUE TAKING MED PRIOR TO SURGERY) 64 Tablet 5    predniSONE (DELTASONE) 10 MG Tab Take 3.5 tablets by mouth in the morning and evening for 5 Days at Day 1, 29 and 57 of each cycle. (Patient taking differently: Take 3.5 tablets by mouth in the morning and evening for 5 Days at Day 1, 29 and 57 of each cycle.) 35 Tablet 6    sulfamethoxazole-trimethoprim (BACTRIM DS) 800-160 MG tablet Take 2 Tablets by mouth twice daily two days a week. (Patient taking differently: Take 2 Tablets by mouth twice daily two days a week. SATURDAYS AND SUNDAYS) 48 Tablet 11    ondansetron (ZOFRAN ODT) 8 MG TABLET DISPERSIBLE Dissolve 1 Tablet by mouth every 6 hours as needed for Nausea/Vomiting. 10 Tablet 0     OBJECTIVE:     Vitals:   /66   Pulse 78   Temp 36.9 °C (98.4 °F) (Temporal)   Resp 20   Ht 1.66 m (5' 5.35\")   Wt 69.3 kg (152 lb 12.5 " oz)   SpO2 98%     Labs:    Hospital Outpatient Visit on 05/20/2024   Component Date Value    WBC 05/20/2024 3.0 (L)     RBC 05/20/2024 3.78 (L)     Hemoglobin 05/20/2024 13.2 (L)     Hematocrit 05/20/2024 38.6 (L)     MCV 05/20/2024 102.1 (H)     MCH 05/20/2024 34.9 (H)     MCHC 05/20/2024 34.2     RDW 05/20/2024 58.5 (H)     Platelet Count 05/20/2024 171     MPV 05/20/2024 9.2     Neutrophils-Polys 05/20/2024 77.90 (H)     Lymphocytes 05/20/2024 5.90 (L)     Monocytes 05/20/2024 12.20     Eosinophils 05/20/2024 2.30     Basophils 05/20/2024 0.70     Immature Granulocytes 05/20/2024 1.00 (H)     Nucleated RBC 05/20/2024 0.00     Neutrophils (Absolute) 05/20/2024 2.36     Lymphs (Absolute) 05/20/2024 0.18 (L)     Monos (Absolute) 05/20/2024 0.37     Eos (Absolute) 05/20/2024 0.07     Baso (Absolute) 05/20/2024 0.02     Immature Granulocytes (a* 05/20/2024 0.03     NRBC (Absolute) 05/20/2024 0.00     Sodium 05/20/2024 140     Potassium 05/20/2024 4.0     Chloride 05/20/2024 108     Co2 05/20/2024 21     Anion Gap 05/20/2024 11.0     Glucose 05/20/2024 89     Bun 05/20/2024 12     Creatinine 05/20/2024 0.58     Calcium 05/20/2024 8.7     Correct Calcium 05/20/2024 8.4 (L)     AST(SGOT) 05/20/2024 27     ALT(SGPT) 05/20/2024 53 (H)     Alkaline Phosphatase 05/20/2024 85     Total Bilirubin 05/20/2024 0.4     Albumin 05/20/2024 4.4     Total Protein 05/20/2024 6.6     Globulin 05/20/2024 2.2     A-G Ratio 05/20/2024 2.0     Direct Bilirubin 05/20/2024 <0.2     Indirect Bilirubin 05/20/2024 see below     GFR (CKD-EPI) 05/20/2024 141      Physical Exam:    Constitutional: Well-developed, well-nourished, and in no distress.  Very well-appearing.  HENT: Normocephalic and atraumatic. No nasal congestion or rhinorrhea. Oropharynx is clear and moist. No oral ulcerations or sores.    Eyes: Conjunctivae are normal. Pupils are equal, round.  EOMI.  Nonicteric.  Neck: Normal range of motion of neck, no adenopathy.     Cardiovascular: Normal rate, regular rhythm and normal heart sounds.  No murmur heard. DP/radial pulses 2+, cap refill < 2 sec.  Pulmonary/Chest: Effort normal and breath sounds normal. No respiratory distress. Symmetric expansion.  No crackles or wheezes.  Abdomen: Soft. Bowel sounds are normal. No distension and no mass. There is no hepatosplenomegaly.    Genitourinary:  Deferred.  Musculoskeletal: Normal range of motion of lower and upper extremities bilaterally.   Neurological: Alert and oriented to person and place. Exhibits normal muscle tone bilaterally in upper and lower extremities. Gait normal. Coordination normal.    Skin: Skin is warm, dry and pink.  No rash or evidence of skin infection.  No pallor.   Psychiatric: Mood and affect normal for age.    ASSESSMENT AND PLAN:     Tomas Jean-Baptiste is a previously healthy 23 year old male with  Precursor B-Cell Lymphoblastic Leukemia with BCR-ABL1 fusion and whose End of Induction IB MRD was both negative by flow cytometry and PCR who presents for Maintenance, Cycle 5, Day 29     1) Ph+ Precursor B-Cell Acute Lymphoblastic Leukemia, in MRD Remission:  - 2-3 weeks of symptoms  - Presenting WBC > 440 k/uL, hyperleukocytosis  - Start of Hydroxyurea (1500 mg PO Q8) 2320 on 5/27/2022  - discontinued after only 55 hours  - No steroid pretreatment  - 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  - Testicular status NEGATIVE       - Flow cytometry of both 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, FISH positive for BCR-ABL1 translocation  - [place] on [facility] [id number]  - Initially enrolled on [facility] [id number] - but 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                            - Enrolled on [facility] [id number] and began study 6/13/2022              - Started imatinib therapy 6/3/2022   - End of Induction IA and IB MRD negative  - Imatinib dose increased to 400 mg PO AM and 250 mg PM 9/29/2022   -* Note:  All imatinib  doses given inpatient rounded down to 600 mg   - Imatinib held for Septic Shock 12/27/2022-1/8/2023   - Imatinib 600 mg PO daily restarted 1/8/2023 inpatient   - Imatinib 400 mg PO QAM and 250 mg PO QHS 1/14/2023-1/17/2023   - Imatinib 600 mg PO QAM 1/17/2023 - 3/30/2023   - Imatinib 600 mg PO QAM 4/11/2023 - 8/13/2023   - Imatinib 550 mg PO QAM 8/14/2023 - 9/26/2023   - Imatinib 550 mg PO QAM 9/29/2023 -11/6/2023   - Imatinib 600 mg PO QAM 11/6/2023 - PRESENT (Updated for weight today 4/22/2024)     - Imatinib dosing updated every 12 weeks. Continue Imatinib at 600 mg PO QAM (4/22/2024)               - WBC 3.0, Hgb 13.2, platelets 171            - ANC 2360,             - AST 27, ALT 53, total bilirubin <0.2                - ANC today greater than 1500 and the second consecutive visit with an ANC >1500 therefore will dose escalate methotrexate today     [id number](OS), AR Arm B, Maintenance, Cycle 5, Day 29:   ** Continue Imatinib at 600 mg PO QAM  ** Vincristine 2 mg IV x 1 dose (GIVEN TODAY)  ** Begin pulse of prednisone 35mg PO QAM and 35 mg PO QHS x 5 days (97.77% of expected dose)      **Increase oral MTX to 13 tablets PO weekly (90.78% of expected dosing)  ** Continue oral 6-mercaptopurine 2 tablets PO daily x 7 days (74.49% of expected dosing)     - Complete therapy 5/30/2024     2) Chemotherapy Related Pancytopenia:           - WBC 3.0, Hgb 13.2, platelets 171           - ANC 2360,   - Transfuse for hemoglobin less than 7 gm/dL or symptomatic  - Transfuse for platelets less than 10,000/microliters or symptomatic  - No transfusions necessary today     3) At Risk of Opportunistic Lung Infection:  - Bactrim DS PO BID Sat and Sun for PJP prophylaxis     4) Central Access:   - R Port-A-Cath in place      5) Research Participant: ON STUDY [id number], AR Arm B, Maintenance, Cycle 5, Day 29             [facility] - Source Data         Diagnosis: Ph+ Precursor B-Cell Acute Lymphoblastic  Leukemia     Disease Status: EOI1A MRD NEGATIVE, EOI1B RD NEGATIVE, CNS3c, testicular negative, HSV1 IgG POSITIVE, CMV IgG NEGATIVE, VARICELLA IgG POSITIVE     Active Studies: [id number], [id number]                                                                                                      Inactive Studies: [id number]                                                                                                                                                Optional Studies: None             Protocol: International Phase 3 trial in [place] 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      Treatment Plan: [id number](OS), AR-Arm B, Maintenance, Cycle 5, Day 29 (5/20/2024)     Height: 1.659 m     Weight: 69.4 kg     BSA: 1.79 m²   (Maintenance, Cycle 5,   Day 1, 4/22/2024: Height and weight updated per protocol)                                                                                                                                           Performance Status: Karnofsky 90, ECOG 1 (4/22/2024)     Treatment Plan Medications:  (100% adherent)     Continue oral 6- mercaptopurine 2 tabs x 7 days   Continue oral MTX to 10.5 tabs weekly   Continue Imatinib to 600 mg PO QHS      Evaluations / Study Labs:  (5/20/2024)     WBC 3.0, Hgb 13.2, platelets 171  ANC 2360,      AST 27, ALT 53, total bilirubin <0.2     CSF PENDING     Therapy Given: (5/20/2024)     Oral chemotherapy as above  Vincristine 2 mg IV x 1  Start 5 days pulse of oral prednisone     Maintenance, Cycle 5 Toxicities:     None         Disposition: COMPLETE THERAPY 5/30/2024     Pepe Faye MD  Pediatric Hematology / Oncology  [facility]  Cell.  [phone]  [facility]. [phone]

## 2024-05-20 NOTE — PROGRESS NOTES
Pt to [facility] for lab draw, doctors office visit, and chemotherapy administration.      Afebrile.  VSS.  Awake and alert in no acute distress.      Port accessed using a 22g 3/4 inch trevino needle with 1 attempt.  Labs drawn from the port without difficulty.   Pt tolerated well.      Office visit completed with Dr Faye.    Chemotherapy dosage calculated independently by Huong Foster, RN and Yue Poole, MONTSERRAT and compared to road map for protocol [id number].  Calculations within 10% of written order.  Lab results reviewed.      Premedications and chemo given as ordered, see MAR.  Blood return verified prior to, during, and after chemotherapy infusion.  See Chemotherapy flowsheet.  PT tolerated well.  No side effects or complications noted.  Port flushed per orders (see MAR) and de-accessed after completion. Pt discharged home. Dr Faye to follow up re: plan for labs

## 2024-05-20 NOTE — ANESTHESIA PROCEDURE NOTES
Airway    Date/Time: 5/20/2024 2:25 PM    Performed by: Siddhartha Hennessy M.D.  Authorized by: Siddhartha Hennessy M.D.    Location:  OR  Urgency:  Elective  Difficult Airway: No    Indications for Airway Management:  Anesthesia      Spontaneous Ventilation: absent    Sedation Level:  Deep  Preoxygenated: Yes    Mask Difficulty Assessment:  1 - vent by mask  Final Airway Type:  Supraglottic airway  Final Supraglottic Airway:  Standard LMA    SGA Size:  4  Number of Attempts at Approach:  1

## 2024-05-20 NOTE — ANESTHESIA PREPROCEDURE EVALUATION
Case: [id number] Date/Time: 05/20/24 1345    Procedure: PORT REMOVAL    Pre-op diagnosis: B-CELL ACUTE LYMPHOBLASTIC LEUKEMIA    Location: [place] [address] / SURGERY [facility]    Surgeons: Simona Moise M.D.            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 male who presents for Procedure(s):  PORT REMOVAL.  He has current and past medical problems significant for:    Past Medical History:   Diagnosis Date    Blood clotting disorder (HCC) 2022    right calf - blood thinners    Cancer (HCC)     Leukoma     Neuropathy     Psychiatric problem     anxiety       SMOKING/ALCOHOL/RECREATIONAL DRUG USE:  Social History     Tobacco Use    Smoking status: Never    Smokeless tobacco: Never   Vaping Use    Vaping status: Never Used   Substance Use Topics    Alcohol use: Never    Drug use: Never     Social History     Substance and Sexual Activity   Drug Use Never       PAST SURGICAL HISTORY:  Past Surgical History:   Procedure Laterality Date    INCISION AND DRAINAGE GENERAL Left 2/17/2023    Procedure: INCISION AND DRAINAGE OF LEFT SHOULDER;  Surgeon: Luke Haddad M.D.;  Location: [facility];  Service: General    CATH PLACEMENT Right 8/29/2022    Procedure: INSERTION, CATHETER;  Surgeon: Simona Moise M.D.;  Location: [facility];  Service: Ent       ALLERGIES:   Allergies   Allergen Reactions    Amoxicillin Rash     Reacted as an infant. No swelling or airway problems.  Tolerated cefepime June 2022       MEDICATIONS:  No current facility-administered medications on file prior to encounter.     Current Outpatient Medications on File Prior to  Encounter   Medication Sig Dispense Refill    methotrexate 2.5 MG tablet Take 10.5 Tablets by mouth every 7 days for 30 days. (Patient taking differently: Take 26.25 mg by mouth every 7 days. COORDINATE MEDICATION INSTRUCTIONS FROM PRESCRIBING PHYSICIAN) 42 Tablet 0    imatinib (GLEEVEC) 400 MG tablet Take 1.5 Tablets by mouth every morning for 120 days. Pt takes 200 mg + 400 mg for a total dose of 600 mg daily (Patient taking differently: Take 600 mg by mouth every morning. Pt takes 200 mg + 400 mg for a total dose of 600 mg daily    CONTINUE TAKING MED PRIOR TO SURGERY) 45 Tablet 3    mercaptopurine (PURINETHOL) 50 MG Tab Take 2 tablets by mouth in the evening x 7 days of the week. (Patient taking differently: Take 2 tablets by mouth in the evening x 7 days of the week.      CONTINUE TAKING MED PRIOR TO SURGERY) 64 Tablet 5    predniSONE (DELTASONE) 10 MG Tab Take 3.5 tablets by mouth in the morning and evening for 5 Days at Day 1, 29 and 57 of each cycle. (Patient taking differently: Take 3.5 tablets by mouth in the morning and evening for 5 Days at Day 1, 29 and 57 of each cycle.      CONTINUE TAKING MED PRIOR TO SURGERY) 35 Tablet 6    LORazepam (ATIVAN) 1 MG Tab Take 1 mg by mouth every four hours as needed for Anxiety. CONTINUE TAKING MED PRIOR TO SURGERY      sulfamethoxazole-trimethoprim (BACTRIM DS) 800-160 MG tablet Take 2 Tablets by mouth twice daily two days a week. (Patient taking differently: Take 2 Tablets by mouth twice daily two days a week. CONTINUE TAKING MED PRIOR TO SURGERY) 48 Tablet 11    Melatonin 5 MG Cap Take 5 mg by mouth at bedtime as needed. (Patient not taking: Reported on 1/29/2024)      ondansetron (ZOFRAN ODT) 8 MG TABLET DISPERSIBLE Dissolve 1 Tablet by mouth every 6 hours as needed for Nausea/Vomiting. 10 Tablet 0       LABS:  Lab Results   Component Value Date/Time    HEMOGLOBIN 12.3 (L) 04/22/2024 0900    HEMATOCRIT 36.1 (L) 04/22/2024 0900    WBC 2.9 (L) 04/22/2024 0900     Lab  Results   Component Value Date/Time    SODIUM 141 04/22/2024 0900    POTASSIUM 3.8 04/22/2024 0900    CHLORIDE 107 04/22/2024 0900    CO2 23 04/22/2024 0900    GLUCOSE 110 (H) 04/22/2024 0900    BUN 10 04/22/2024 0900    CALCIUM 8.7 04/22/2024 0900         PREVIOUS ANESTHETICS: See EMR  __________________________________________      Physical Exam    Airway   Mallampati: I  TM distance: >3 FB  Neck ROM: full       Cardiovascular - normal exam  Rhythm: regular  Rate: normal  (-) murmur     Dental - normal exam           Pulmonary - normal exam  Breath sounds clear to auscultation     Abdominal    Neurological - normal exam                   Anesthesia Plan    ASA 3 (Advanced oncologic state)   ASA physical status 3 criteria: other (comment)    Plan - general       Airway plan will be LMA          Induction: intravenous    Postoperative Plan: Postoperative administration of opioids is intended.    Pertinent diagnostic labs and testing reviewed    Informed Consent:    Anesthetic plan and risks discussed with patient.    Use of blood products discussed with: patient whom consented to blood products.

## 2024-05-21 ENCOUNTER — TELEPHONE (OUTPATIENT)
Dept: INFUSION CENTER | Facility: MEDICAL CENTER | Age: 24
End: 2024-05-21
Payer: MEDICAID

## 2024-05-23 DIAGNOSIS — C91.Z0 B LYMPHOBLASTIC LEUKEMIA WITH T(9;22)(Q34;Q11.2);BCR-ABL1 (HCC): ICD-10-CM

## 2024-05-24 DIAGNOSIS — Z51.11 ENCOUNTER FOR CHEMOTHERAPY MANAGEMENT: ICD-10-CM

## 2024-05-29 RX ORDER — METHOTREXATE 2.5 MG/1
26.25 TABLET ORAL
Qty: 42 TABLET | Refills: 0 | Status: SHIPPED | OUTPATIENT
Start: 2024-05-29 | End: 2024-06-28

## 2024-05-30 ENCOUNTER — TELEMEDICINE (OUTPATIENT)
Dept: PSYCHOLOGY | Facility: MEDICAL CENTER | Age: 24
End: 2024-05-30
Attending: PSYCHOLOGIST
Payer: MEDICAID

## 2024-05-30 DIAGNOSIS — C91.Z0 B LYMPHOBLASTIC LEUKEMIA WITH T(9;22)(Q34;Q11.2);BCR-ABL1 (HCC): ICD-10-CM

## 2024-05-30 NOTE — PROGRESS NOTES
PEDIATRIC BEHAVIORAL HEALTH VISIT    ADULT REQUEST FOR CONFIDENTIALITY    Name:  Tomas Jean-Baptiste  MRN:  [id number]  :  2000  Age:  23 y.o.  Referring Provider: Dr. Faye (Hem/Onc)  Pediatrician:  Margarito Arvizu M.D.  Date of Service:  2024    Discussed with patient: You have chosen to receive care through the use of secure virtual/telemedicine. This platform enables health care providers at different locations to provide safe, effective, and convenient care through the use of technology. As with any health care service, there are risks associated with the use of this platform, including equipment failure, poor image resolution or no image, and  issues. You also understand that I cannot physically examine you and that you may need to come to clinic to complete the assessment.    Patient verbally asserts understanding of the risks and benefits of telemedicine as explained. Endorses all questions answered regarding telemedicine.     I conducted this encounter from [facility] via secure, live, telephone or audio/video conference with the patient. Patient was located in [place], [place]. Prior to the interview, the risks and benefits of telemedicine were discussed with the patient and verbal consent was obtained.     Persons in Attendance: Tomas Roy     Chief Complaint/ Reason for Appointment: JULY, a 24 y/o male currently in treatment for [place] Chromosome Precursor B-Cell Acute Lymphoblastic Leukemia was referred to counseling to assist in decreasing anxiety he has been experiencing and processing ways for how he can find himself now and what life will look like. See intake note dated 23    Mental Status Exam:   General description In no apparent distress, well-groomed, appropriately attired, well-nourished, and cooperative with interview  Interactional style Culturally appropriate  Eye contact Normal and appropriate for culture  Speech  "Unimpaired, fluid and clear, normal rate and rythem  Motor activity Normal motor activity  Orientation Oriented to person time, place and situation  Intellectual functioning Unimpaired  Memory Unimpaired  Attention and concentration Intact and normative concentration  Fund of knowledge Average  Mood Generally euthymic  Affect Appropriate  Perceptual Disturbances None apparent  Thought Process  No abnormalities apparent       Associations Unimpaired associations       Abstractions Normal abstractions, intact       Insight Insight - adequate and normative       Judgment Judgments - intact and normative   Thought Content  No apparent delusions    Risk Assessment:  Tomas Roy denied current concerns regarding risk to self or others.       Issues Discussed:   This provider met with JULY to continue assisting in rediscovering himself after the trauma of his cancer diagnosis and treatment as well as how childhood impacts this. Today JULY processed how lift feels now off treatment and working. The remainder of the session was then spent processing his thoughts and feelings about the pressure he is getting from his family to make amends with his mother and apologize. JULY reported that his mother has agreed to talk with him and he wants to point out to her all the things she has done. He expressed feeling that as much as he wishes things could go back to how they were things are different now. Encouraged JULY to have a main point to his meeting with his mother (ie \"how could you do this to me, you are my mother\") so he does not get lost in the minutiae of making his points. Also encouraged him to think about what he wants from her now, how can their relationship be now?    Techniques and Interventions Used: Psycho-education and Cognitive Behavioral Therapy (CBT)      Treatment Recommendations and Plan:  JULY, a 24 y/o male currently in treatment for [place] Chromosome Precursor B-Cell Acute Lymphoblastic Leukemia was " referred to counseling to assist in decreasing anxiety he has been experiencing and processing ways for how he can find himself now and what life will look like. After meeting with JULY during his intake, it appears he could benefit from learning anxiety management skills, processing what he has been through, and how to live the life he wants. Tomas Jean-Baptiste could benefit from learning appropriate ways of expressing and coping with his emotions. He could also benefit from learning Cognitive Behavioral Therapy (CBT) and Acceptance and Commitment Therapy (ACT) tools to understand the interaction of thoughts, feelings, and actions. As well as Trauma Focused-CBT to process his cancer journey. Tomas Jean-Baptiste agreed with the plan.       PLAN  JULY will learn and utilize appropriate ways to express and cope with emotions.    He will learn the connection between thoughts, feelings, and actions utilizing CBT and ACT tools.,   Processed the thoughts that arise around his mother and her actions.  JULY will process how their medical diagnosis is impacting their life.    Next visit we will review what occurred with his mother and feelings about his end of treatment.    The above diagnostic impressions, recommendations, and treatment plan were discussed with and agreed upon by Tomas Restoration, and his caregivers. Care will be coordinated with Tomas Roy's healthcare team, as appropriate.    Total time spent on encounter was 60 minutes.    Laurie Ortega, PhD  Pediatric Psychologist   Licensed Psychologist, [place] # [id number]  [facility], Behavioral Health

## 2024-06-05 ENCOUNTER — HOSPITAL ENCOUNTER (OUTPATIENT)
Dept: CARDIOLOGY | Facility: MEDICAL CENTER | Age: 24
End: 2024-06-05
Attending: PEDIATRICS
Payer: MEDICAID

## 2024-06-05 ENCOUNTER — HOSPITAL ENCOUNTER (OUTPATIENT)
Dept: RADIOLOGY | Facility: MEDICAL CENTER | Age: 24
End: 2024-06-05
Attending: PEDIATRICS
Payer: MEDICAID

## 2024-06-05 DIAGNOSIS — C91.Z0 B LYMPHOBLASTIC LEUKEMIA WITH T(9;22)(Q34;Q11.2);BCR-ABL1 (HCC): ICD-10-CM

## 2024-06-05 LAB
LV EJECT FRACT  99904: 61
LV EJECT FRACT MOD 2C 99903: 57.84
LV EJECT FRACT MOD 4C 99902: 60.33
LV EJECT FRACT MOD BP 99901: 60.69

## 2024-06-05 PROCEDURE — 77072 BONE AGE STUDIES: CPT

## 2024-06-05 PROCEDURE — 93306 TTE W/DOPPLER COMPLETE: CPT | Mod: 26 | Performed by: INTERNAL MEDICINE

## 2024-06-05 PROCEDURE — 93306 TTE W/DOPPLER COMPLETE: CPT

## 2024-06-06 ENCOUNTER — TELEMEDICINE (OUTPATIENT)
Dept: PSYCHOLOGY | Facility: MEDICAL CENTER | Age: 24
End: 2024-06-06
Attending: PSYCHOLOGIST
Payer: MEDICAID

## 2024-06-06 DIAGNOSIS — C91.Z0 B LYMPHOBLASTIC LEUKEMIA WITH T(9;22)(Q34;Q11.2);BCR-ABL1 (HCC): ICD-10-CM

## 2024-06-06 PROCEDURE — 96158 HLTH BHV IVNTJ INDIV 1ST 30: CPT | Performed by: PSYCHOLOGIST

## 2024-06-06 PROCEDURE — 96159 HLTH BHV IVNTJ INDIV EA ADDL: CPT | Performed by: PSYCHOLOGIST

## 2024-06-07 NOTE — PROGRESS NOTES
PEDIATRIC BEHAVIORAL HEALTH VISIT    ADULT REQUEST FOR CONFIDENTIALITY    Name:  Tomas Jean-Baptiste  MRN:  [id number]  :  2000  Age:  23 y.o.  Referring Provider: Dr. Faye (Hem/Onc)  Pediatrician:  Margarito Arvizu M.D.  Date of Service:  2024    Discussed with patient: You have chosen to receive care through the use of secure virtual/telemedicine. This platform enables health care providers at different locations to provide safe, effective, and convenient care through the use of technology. As with any health care service, there are risks associated with the use of this platform, including equipment failure, poor image resolution or no image, and  issues. You also understand that I cannot physically examine you and that you may need to come to clinic to complete the assessment.    Patient verbally asserts understanding of the risks and benefits of telemedicine as explained. Endorses all questions answered regarding telemedicine.     I conducted this encounter from [facility] via secure, live, telephone or audio/video conference with the patient. Patient was located in [place], [place]. Prior to the interview, the risks and benefits of telemedicine were discussed with the patient and verbal consent was obtained.     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 See intake note dated 23    Mental Status Exam:   General description In no apparent distress, well-groomed, appropriately attired, well-nourished, and cooperative with interview  Interactional style Culturally appropriate  Eye contact Normal and appropriate for culture  Speech  "Unimpaired, fluid and clear, normal rate and rythem  Motor activity Normal motor activity  Orientation Oriented to person time, place and situation  Intellectual functioning Unimpaired  Memory Unimpaired  Attention and concentration Intact and normative concentration  Fund of knowledge Average  Mood Generally euthymic  Affect Appropriate  Perceptual Disturbances None apparent  Thought Process  No abnormalities apparent       Associations Unimpaired associations       Abstractions Normal abstractions, intact       Insight Insight - adequate and normative       Judgment Judgments - intact and normative   Thought Content  No apparent delusions    Risk Assessment:  Tomas Roy denied current concerns regarding risk to self or others.       Issues Discussed:   This provider met with JULY to continue assisting in rediscovering himself after the trauma of his cancer diagnosis and treatment as well as how childhood impacts this. Today JULY processed how after last visit he found out that his mother did not want to talk to him, but rather his uncle said it to get JUYL to talk to her. Processed how it feels that family wants him to apologize so things go back to \"normal\". JULY reviewed how he would like a relationship with his family because he misses them, but how his mother needs to have some insight too. He left a present for his brother and got a message from his cousin that it made his brother happy, which felt good for JULY.     Techniques and Interventions Used: Psycho-education and Cognitive Behavioral Therapy (CBT)      Treatment Recommendations and Plan:  JULY, a 24 y/o male currently in treatment for [place] Chromosome Precursor B-Cell Acute Lymphoblastic Leukemia was referred to counseling to assist in decreasing anxiety he has been experiencing and processing ways for how he can find himself now and what life will look like. After meeting with JULY during his intake, it appears he could benefit from learning anxiety " management skills, processing what he has been through, and how to live the life he wants. Tomas Jean-Baptiste could benefit from learning appropriate ways of expressing and coping with his emotions. He could also benefit from learning Cognitive Behavioral Therapy (CBT) and Acceptance and Commitment Therapy (ACT) tools to understand the interaction of thoughts, feelings, and actions. As well as Trauma Focused-CBT to process his cancer journey. Tomas Jaen-Baptiste agreed with the plan.       PLAN  JULY will learn and utilize appropriate ways to express and cope with emotions.    He will learn the connection between thoughts, feelings, and actions utilizing CBT and ACT tools.,   Processed the thoughts that arise around his mother and her actions.  JULY will process how their medical diagnosis is impacting their life.    Next visit we will spend time processing how ending treatment has felt.     The above diagnostic impressions, recommendations, and treatment plan were discussed with and agreed upon by Tomas Roy, and his caregivers. Care will be coordinated with Tomas Baptist's healthcare team, as appropriate.    Total time spent on encounter was 45 minutes.    Laurie Ortega, PhD  Pediatric Psychologist   Licensed Psychologist, [place] # [id number]  [facility], Behavioral Health

## 2024-06-13 ENCOUNTER — PATIENT OUTREACH (OUTPATIENT)
Dept: PEDIATRIC HEMATOLOGY/ONCOLOGY | Facility: MEDICAL CENTER | Age: 24
End: 2024-06-13
Payer: MEDICAID

## 2024-06-13 NOTE — PROGRESS NOTES
Medical Social Work    SW  returned  patient's voicemail, left on 6/12/24. Patient reports he is afraid he is losing insurance now that he is working full time through an externship. Patient states he has turned in his first paycheck stub to [facility] on 6/3/24, along with a letter from his externship explaining full time hours will be completed for roughly 2 months and reduced to part time when school semester resumes end of August. SW explained the process will not be fast at [facility] likely being ended, along with Medicaid being termed. SW expects patient will have coverage through July, and possibly be without coverage until he resumes school and externship hours reduce to part time. At that point patient will be able to apply for Medicaid coverage again.     Patient also reported he has reconnected with his father, and he has been very supportive. He will be adding patient to his insurance during open enrollment.     Patient remains estranged from his mother, which understandably continues to be a trigger for him.    Patient continues to see Dr. Ortega for therapy, and his next appointment is scheduled for 7/10/24.    Plan: SW has asked for patient to provide an update when he hears back from [facility] and Medicaid.

## 2024-07-08 ENCOUNTER — HOSPITAL ENCOUNTER (OUTPATIENT)
Dept: PEDIATRIC HEMATOLOGY/ONCOLOGY | Facility: MEDICAL CENTER | Age: 24
End: 2024-07-08
Attending: PEDIATRICS
Payer: MEDICAID

## 2024-07-08 ENCOUNTER — HOSPITAL ENCOUNTER (OUTPATIENT)
Facility: MEDICAL CENTER | Age: 24
End: 2024-07-08
Attending: PEDIATRICS
Payer: MEDICAID

## 2024-07-08 VITALS
HEART RATE: 104 BPM | TEMPERATURE: 99 F | HEIGHT: 65 IN | DIASTOLIC BLOOD PRESSURE: 84 MMHG | SYSTOLIC BLOOD PRESSURE: 135 MMHG | RESPIRATION RATE: 18 BRPM | BODY MASS INDEX: 27.88 KG/M2 | WEIGHT: 167.33 LBS

## 2024-07-08 DIAGNOSIS — C91.01 ACUTE LYMPHOBLASTIC LEUKEMIA (ALL) IN REMISSION (HCC): ICD-10-CM

## 2024-07-08 DIAGNOSIS — L30.8 OTHER ECZEMA: ICD-10-CM

## 2024-07-08 LAB
ALBUMIN SERPL BCP-MCNC: 4.2 G/DL (ref 3.2–4.9)
ALBUMIN/GLOB SERPL: 1.8 G/DL
ALP SERPL-CCNC: 91 U/L (ref 30–99)
ALT SERPL-CCNC: 8 U/L (ref 2–50)
ANION GAP SERPL CALC-SCNC: 12 MMOL/L (ref 7–16)
AST SERPL-CCNC: 14 U/L (ref 12–45)
BASOPHILS # BLD AUTO: 0.4 % (ref 0–1.8)
BASOPHILS # BLD: 0.03 K/UL (ref 0–0.12)
BILIRUB CONJ SERPL-MCNC: <0.2 MG/DL (ref 0.1–0.5)
BILIRUB SERPL-MCNC: <0.2 MG/DL (ref 0.1–1.5)
BUN SERPL-MCNC: 12 MG/DL (ref 8–22)
CALCIUM ALBUM COR SERPL-MCNC: 9 MG/DL (ref 8.5–10.5)
CALCIUM SERPL-MCNC: 9.2 MG/DL (ref 8.5–10.5)
CHLORIDE SERPL-SCNC: 107 MMOL/L (ref 96–112)
CO2 SERPL-SCNC: 21 MMOL/L (ref 20–33)
CREAT SERPL-MCNC: 0.62 MG/DL (ref 0.5–1.4)
EOSINOPHIL # BLD AUTO: 0.23 K/UL (ref 0–0.51)
EOSINOPHIL NFR BLD: 3.3 % (ref 0–6.9)
ERYTHROCYTE [DISTWIDTH] IN BLOOD BY AUTOMATED COUNT: 44.3 FL (ref 35.9–50)
GFR SERPLBLD CREATININE-BSD FMLA CKD-EPI: 138 ML/MIN/1.73 M 2
GLOBULIN SER CALC-MCNC: 2.3 G/DL (ref 1.9–3.5)
GLUCOSE SERPL-MCNC: 136 MG/DL (ref 65–99)
HCT VFR BLD AUTO: 41 % (ref 42–52)
HGB BLD-MCNC: 14 G/DL (ref 14–18)
IMM GRANULOCYTES # BLD AUTO: 0.08 K/UL (ref 0–0.11)
IMM GRANULOCYTES NFR BLD AUTO: 1.1 % (ref 0–0.9)
LYMPHOCYTES # BLD AUTO: 1.22 K/UL (ref 1–4.8)
LYMPHOCYTES NFR BLD: 17.3 % (ref 22–41)
MAGNESIUM SERPL-MCNC: 2.1 MG/DL (ref 1.5–2.5)
MCH RBC QN AUTO: 32.9 PG (ref 27–33)
MCHC RBC AUTO-ENTMCNC: 34.1 G/DL (ref 32.3–36.5)
MCV RBC AUTO: 96.5 FL (ref 81.4–97.8)
MONOCYTES # BLD AUTO: 0.68 K/UL (ref 0–0.85)
MONOCYTES NFR BLD AUTO: 9.6 % (ref 0–13.4)
NEUTROPHILS # BLD AUTO: 4.82 K/UL (ref 1.82–7.42)
NEUTROPHILS NFR BLD: 68.3 % (ref 44–72)
NRBC # BLD AUTO: 0 K/UL
NRBC BLD-RTO: 0 /100 WBC (ref 0–0.2)
PHOSPHATE SERPL-MCNC: 3.2 MG/DL (ref 2.5–4.5)
PLATELET # BLD AUTO: 306 K/UL (ref 164–446)
PMV BLD AUTO: 9.5 FL (ref 9–12.9)
POTASSIUM SERPL-SCNC: 4.3 MMOL/L (ref 3.6–5.5)
PROT SERPL-MCNC: 6.5 G/DL (ref 6–8.2)
RBC # BLD AUTO: 4.25 M/UL (ref 4.7–6.1)
SODIUM SERPL-SCNC: 140 MMOL/L (ref 135–145)
WBC # BLD AUTO: 7.1 K/UL (ref 4.8–10.8)

## 2024-07-08 PROCEDURE — 99214 OFFICE O/P EST MOD 30 MIN: CPT | Performed by: PEDIATRICS

## 2024-07-08 PROCEDURE — 99213 OFFICE O/P EST LOW 20 MIN: CPT | Performed by: PEDIATRICS

## 2024-07-08 PROCEDURE — 80053 COMPREHEN METABOLIC PANEL: CPT

## 2024-07-08 PROCEDURE — 36415 COLL VENOUS BLD VENIPUNCTURE: CPT

## 2024-07-08 PROCEDURE — 85025 COMPLETE CBC W/AUTO DIFF WBC: CPT

## 2024-07-08 PROCEDURE — 82248 BILIRUBIN DIRECT: CPT

## 2024-07-08 PROCEDURE — 83735 ASSAY OF MAGNESIUM: CPT

## 2024-07-08 PROCEDURE — 84100 ASSAY OF PHOSPHORUS: CPT

## 2024-07-08 RX ORDER — BENZOCAINE/MENTHOL 6 MG-10 MG
1 LOZENGE MUCOUS MEMBRANE 2 TIMES DAILY
Qty: 28 G | Refills: 2 | Status: SHIPPED | OUTPATIENT
Start: 2024-07-08

## 2024-07-08 ASSESSMENT — FIBROSIS 4 INDEX: FIB4 SCORE: 0.48

## 2024-07-10 ENCOUNTER — APPOINTMENT (OUTPATIENT)
Dept: PSYCHOLOGY | Facility: MEDICAL CENTER | Age: 24
End: 2024-07-10
Attending: PSYCHOLOGIST
Payer: MEDICAID

## 2024-07-11 PROBLEM — Z95.828 PORT-A-CATH IN PLACE: Status: RESOLVED | Noted: 2022-11-15 | Resolved: 2024-07-11

## 2024-07-11 PROBLEM — Z51.11 ENCOUNTER FOR CHEMOTHERAPY MANAGEMENT: Status: RESOLVED | Noted: 2022-06-09 | Resolved: 2024-07-11

## 2024-07-11 PROBLEM — C91.01 ACUTE LYMPHOBLASTIC LEUKEMIA (ALL) IN REMISSION (HCC): Status: RESOLVED | Noted: 2023-05-08 | Resolved: 2024-07-11

## 2024-07-16 ENCOUNTER — TELEMEDICINE (OUTPATIENT)
Dept: PSYCHOLOGY | Facility: MEDICAL CENTER | Age: 24
End: 2024-07-16
Payer: MEDICAID

## 2024-07-16 DIAGNOSIS — C91.Z0 B LYMPHOBLASTIC LEUKEMIA WITH T(9;22)(Q34;Q11.2);BCR-ABL1 (HCC): ICD-10-CM

## 2024-07-16 PROCEDURE — 96159 HLTH BHV IVNTJ INDIV EA ADDL: CPT | Performed by: PSYCHOLOGIST

## 2024-07-16 PROCEDURE — 96158 HLTH BHV IVNTJ INDIV 1ST 30: CPT | Performed by: PSYCHOLOGIST

## 2024-07-18 ENCOUNTER — PATIENT OUTREACH (OUTPATIENT)
Dept: HEALTH INFORMATION MANAGEMENT | Facility: OTHER | Age: 24
End: 2024-07-18
Payer: MEDICAID

## 2024-08-08 ENCOUNTER — HOSPITAL ENCOUNTER (OUTPATIENT)
Dept: PEDIATRIC HEMATOLOGY/ONCOLOGY | Facility: MEDICAL CENTER | Age: 24
End: 2024-08-08
Attending: PEDIATRICS
Payer: MEDICAID

## 2024-08-08 ENCOUNTER — HOSPITAL ENCOUNTER (OUTPATIENT)
Facility: MEDICAL CENTER | Age: 24
End: 2024-08-08
Attending: PEDIATRICS
Payer: MEDICAID

## 2024-08-08 VITALS
OXYGEN SATURATION: 96 % | DIASTOLIC BLOOD PRESSURE: 70 MMHG | TEMPERATURE: 99.1 F | HEART RATE: 80 BPM | WEIGHT: 167.55 LBS | SYSTOLIC BLOOD PRESSURE: 133 MMHG | BODY MASS INDEX: 27.92 KG/M2 | HEIGHT: 65 IN

## 2024-08-08 DIAGNOSIS — Z08 ROUTINE CANCER FOLLOW-UP VISIT: ICD-10-CM

## 2024-08-08 LAB
25(OH)D3 SERPL-MCNC: 20 NG/ML (ref 30–100)
ALBUMIN SERPL BCP-MCNC: 4.2 G/DL (ref 3.2–4.9)
ALBUMIN/GLOB SERPL: 1.2 G/DL
ALP SERPL-CCNC: 102 U/L (ref 30–99)
ALT SERPL-CCNC: 15 U/L (ref 2–50)
ANION GAP SERPL CALC-SCNC: 12 MMOL/L (ref 7–16)
AST SERPL-CCNC: 17 U/L (ref 12–45)
BASOPHILS # BLD AUTO: 0.6 % (ref 0–1.8)
BASOPHILS # BLD: 0.03 K/UL (ref 0–0.12)
BILIRUB SERPL-MCNC: 0.3 MG/DL (ref 0.1–1.5)
BUN SERPL-MCNC: 12 MG/DL (ref 8–22)
CALCIUM ALBUM COR SERPL-MCNC: 9.3 MG/DL (ref 8.5–10.5)
CALCIUM SERPL-MCNC: 9.5 MG/DL (ref 8.5–10.5)
CHLORIDE SERPL-SCNC: 107 MMOL/L (ref 96–112)
CO2 SERPL-SCNC: 21 MMOL/L (ref 20–33)
CREAT SERPL-MCNC: 0.59 MG/DL (ref 0.5–1.4)
EOSINOPHIL # BLD AUTO: 0.13 K/UL (ref 0–0.51)
EOSINOPHIL NFR BLD: 2.5 % (ref 0–6.9)
ERYTHROCYTE [DISTWIDTH] IN BLOOD BY AUTOMATED COUNT: 43.3 FL (ref 35.9–50)
GFR SERPLBLD CREATININE-BSD FMLA CKD-EPI: 140 ML/MIN/1.73 M 2
GLOBULIN SER CALC-MCNC: 3.4 G/DL (ref 1.9–3.5)
GLUCOSE SERPL-MCNC: 98 MG/DL (ref 65–99)
HCT VFR BLD AUTO: 45.5 % (ref 42–52)
HGB BLD-MCNC: 15.1 G/DL (ref 14–18)
IMM GRANULOCYTES # BLD AUTO: 0.01 K/UL (ref 0–0.11)
IMM GRANULOCYTES NFR BLD AUTO: 0.2 % (ref 0–0.9)
LYMPHOCYTES # BLD AUTO: 1.1 K/UL (ref 1–4.8)
LYMPHOCYTES NFR BLD: 21.6 % (ref 22–41)
MCH RBC QN AUTO: 31.2 PG (ref 27–33)
MCHC RBC AUTO-ENTMCNC: 33.2 G/DL (ref 32.3–36.5)
MCV RBC AUTO: 94 FL (ref 81.4–97.8)
MONOCYTES # BLD AUTO: 0.6 K/UL (ref 0–0.85)
MONOCYTES NFR BLD AUTO: 11.8 % (ref 0–13.4)
NEUTROPHILS # BLD AUTO: 3.23 K/UL (ref 1.82–7.42)
NEUTROPHILS NFR BLD: 63.3 % (ref 44–72)
NRBC # BLD AUTO: 0 K/UL
NRBC BLD-RTO: 0 /100 WBC (ref 0–0.2)
PLATELET # BLD AUTO: 248 K/UL (ref 164–446)
PMV BLD AUTO: 9.1 FL (ref 9–12.9)
POTASSIUM SERPL-SCNC: 4 MMOL/L (ref 3.6–5.5)
PROT SERPL-MCNC: 7.6 G/DL (ref 6–8.2)
RBC # BLD AUTO: 4.84 M/UL (ref 4.7–6.1)
SODIUM SERPL-SCNC: 140 MMOL/L (ref 135–145)
WBC # BLD AUTO: 5.1 K/UL (ref 4.8–10.8)

## 2024-08-08 PROCEDURE — 99214 OFFICE O/P EST MOD 30 MIN: CPT | Performed by: PEDIATRICS

## 2024-08-08 PROCEDURE — 99212 OFFICE O/P EST SF 10 MIN: CPT | Performed by: PEDIATRICS

## 2024-08-08 PROCEDURE — 80053 COMPREHEN METABOLIC PANEL: CPT

## 2024-08-08 PROCEDURE — 85025 COMPLETE CBC W/AUTO DIFF WBC: CPT

## 2024-08-08 PROCEDURE — 99212 OFFICE O/P EST SF 10 MIN: CPT

## 2024-08-08 PROCEDURE — 36415 COLL VENOUS BLD VENIPUNCTURE: CPT

## 2024-08-08 PROCEDURE — 82306 VITAMIN D 25 HYDROXY: CPT

## 2024-08-08 ASSESSMENT — FIBROSIS 4 INDEX: FIB4 SCORE: 0.36

## 2024-08-08 NOTE — PROGRESS NOTES
Pt to [facility] for lab draw and DrReid visit.  Afebrile.  VSS.  Awake and alert in no acute distress.  Labs drawn from the left ac without difficulty / with 1 attempt.   Pt tolerated well.  Visit with Dr. Faye completed. No further orders. Plan to follow up in 1 month.

## 2024-08-09 NOTE — PROGRESS NOTES
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 demonstrated a WBC of 0.4, Hgb 8.8 and platelets 125.  ANC at the time was measured at only 170.  JULY was otherwise clinically well appearing.  He did receive a one-time normal saline bolus for his nausea and vomiting and was sent home with instructions to HOLD his oral mercaptopurine and oral methotrexate which began being held on 7/28/2023.  Per protocol, imatinib was continued at previous dose of 600 mg in the morning. Tomas Roy would return to clinic again on 8/2/2023 and 8/7/2023.  On both occasions, ANC remained under 500 and oral therapy (with the exception of imatinib) continue to be held while awaiting count recovery.  Ultimately, on 8/11/2023 after 14 days of therapy being held, Tomas Roy returned to clinic and WBC had increased to 2.1 with ANC increasing to 1500 with an absolute monocyte count of 290. Tomas  Kirill was then instructed to resume his oral chemotherapy at 100% of prior dosing with (due to timing with Day 1 of Cycle 2 with LP 3 days later, no weekly MTX was administered).  Imatinib was never held.  On 8/14/2023 he was seen in clinic for Day 1 of Cycle 2 of Maintenance with lumbar puncture, vincristine, and 5-day pulse of steroids.  At the time, his ANC was 2010 and his oral chemotherapy was continued at 100% dosing.  Given his history of previously drop in counts, he was scheduled to come back for repeat labs on 8/29/2023 at which point his WBC count was 1.4 and his ANC remained greater than 500 at 1140.  Again, his therapy was continued with imatinib 550 mg by mouth in the morning as well as 100% dosing of methotrexate and 100% dosing of 6-mercaptopurine.  When Tomas Roy returned to clinic on 9/11/2023 for his Maintenance, Cycle 2, Day 29 therapy he was noted to have had another drop in his WBC to 600 and his ANC accordingly was also decreased at 410.  He did receive vincristine in accordance with protocol as well as starting a 5-day pulse of prednisone per protocol.  Given however that his ANC had dropped below 500 his oral methotrexate and oral 6-MP were again held.  He was instructed return to clinic 1 week later for lab evaluations.  On 9/19/2023 he returned to clinic and WBC had improved slightly to 0.8 however ANC remained low at 260 with an absolute monocyte count of 220.  Given that counts not recovered his oral chemotherapy remained held for an additional week.  On 9/26/2023 at 14 days after initially holding chemotherapy, he was seen back in clinic at which time WBC improved again to 1.1 however ANC did not quite recover and remained at 490 with an absolute monocyte count of 330.  Given that 2 weeks had elapsed with myelosuppression, imatinib was held in addition to oral 6-MP and methotrexate. Tomas Roy was instructed to return to clinic on 9/29/2023 for repeat counts.  On  9/29/2023 WBC had improved to 2.4 and ANC had finally recovered with 1530 and an absolute monocyte count of 510.  Given a recovery with ANC greater than 750 and platelets greater than 75, oral chemotherapy was restarted at 50% of initial dosing and imatinib was restarted at 100% of initial dosing.  On 10/9/2023, Tomas Roy returned to clinic for his Day 57 of Cycle 2 visit.  At the time of his visit, his ANC had recovered after holding chemotherapy and then restarting at 50% dosing 11 days prior.  He did however in clinic spiked a temperature 102.5 °F.  For this reason despite his ANC being improved, no dose adjustments were made in his chemotherapy.  He continued with 50% dosing with 100% adherence of his 6-MP and methotrexate as well as his imatinib at 550 mg PO QHS.   Tomas Roy was then seen in clinic again on 11/6/2023 for his Day 1 of Cycle 3 of Maintenance therapy.  At the time, his imatinib dose was adjusted back up to 600 mg daily taken in the morning.  Additionally, his methotrexate was adjusted back up to 75% of expected dosing and his mercaptopurine was increased to 75% of expected dosing as well.  He will return to clinic on 12/4/2023 for his Day 29 of Cycle 3 therapy.  At the time, his ANC was exactly 1500 and therefore no dose adjustments were made and he continued at 75% dosing for oral chemotherapy as well as the imatinib dose of 600 mg daily.  On 1/2/2024 he was seen for his Day 57 evaluations and his ANC had dropped to 1450 again not prompting any change in oral chemotherapy dosing.  Tomas Roy completed his Cycle 4 chemotherapy without any adverse events or complications.  He did not have any changes in medications either.  Most recently,  Tomas Roy was seen in clinic on 4/22/2023 for his Day 1 of Cycle 5 chemotherapy.  At the time, a lumbar puncture was performed and IT chemotherapy was provided.  He tolerated the procedure and the therapy well without any sequelae.  His  ANC at the time was > 1500 however the dose adjustment was made as this was the first ANC greater than 1500 and Tomas Roy had a history of precipitous drops in his counts with increases in his oral chemotherapy.  On 5/20/2024, Tomas Roy presented to clinic for his Cycle 5, Day 29 therapy and evaluations. At that time, he was started on 5 day pulse of prednisone and his oral methotrexate dose was increased to 13 tablets PO weekly (90.78% of expected dosing). His port was removed on 5/20/2024 by Dr. Moise. He completed therapy on 5/30/2024. Tomas Roy was most recently seen in clinic on 7/8/2024 for his one month off therapy visit.      On presentation, only complaint is for neuropathy in his feet.  He reports that the neuropathic pain and discomfort is a shooting/stabbing pain.  It is pretty much always present but comes and goes.  He has tried moisturizer but it reamins dry and red.  Hasn't tried steroid cream prescribed last visit.  Otherwise well with good energy and activity.  Going back to school.  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  No headahces, no changes in vision, no neurological status changes.  No nausea, vomiting diarrhea or constipation.  Taking Bactrim.    Review of Systems:     Constitutional: Afebrile.  Without recent illness.  Energy and activity are good.   HENT: Negative.  Eyes: Negative for visual changes.  Respiratory: Negative for shortness of breath.  Cardiovascular: Negative.  Gastrointestinal: Negative.   Genitourinary: Negative .  Musculoskeletal: Negative for joint or muscle pains.  Foot pain.   Skin: Negative for rash, signs of infection. Dry red soles.  Neurological: Negative for numbness, tingling, sensory changes, weakness or headaches.    Endo/Heme/Allergies: Does not bruise/bleed easily.    Psychiatric/Behavioral: No changes in mood, appropriate for age.     PAST MEDICAL HISTORY:     Oncologic History:  2-3 week history of worsening fatigue, right lower  extremity pain  Presentation to OSH and diagnosed with right LE superficial thrombus, subsegmental PE and hyperleukocytosis, anemia and thrombocytopenia  Transferred to [facility] for definitive care  Presenting (local) WBC > 440K, Hgb 10.0, platelets 53, (automated differential ANC 3190, ALC 75,310, absolute monocyte count 90106, absolute blast count 340,560)  Uric Acid 15.6, phosphorus 5.6, LDH 1114  Rasburicase x 1 dose given   Peripheral Blood flow cytometry demonstrating CD10 pos, CD19 pos, CD20 neg, CD22 dim (60%) 5/28/2022  Peripheral blood FISH for BCR-ABL1 positive in 98% of analyzed cells     Age at Diagnosis: 21 years  White Blood Cell Count at Presentation: > 440 k/uL  Testicular Disease Status: Negative (see procedure note 5/30/2022)  CNS Status: 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, diagnostic LP with WBC 1, RBC 3 and no evidence of leukemic blasts 5/30/2022  Steroid Pre-treatment: None  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     All inclusion/exclusion criteria for [id number] met and consent signed - enrolled 5/29/2022   All inclusion/exclusion criteria for [id number] met and consent signed - enrolled 5/30/2022  Confirmatory bone marrow aspirate and biopsy and diagnostic LP + cytarabine 70 mg IT 5/30/2022  Induction therapy (ON STUDY [id number]) started 5/30/2022  Bone marrow immunohistochemistry consistent with diagnosis of B-ALL comprising 90% of marrow cellularity  Bone marrow sample sent to [facility] for COG purposes:  Flow cytom  Of the blood pressure little high that is a problem is a cultural problem is well and cultural genetic and everything else like that unfortunately breathalyzers such bad heart disease diabetes things like that  populations etry consistent with peripheral blood, cytogenetics remarkable for known t(9;22)  CSF with WBC 1, RBC 3, no blasts identified on cytospin  FISH results available  5/31/2022 making patient eligible for transfer from [place] [address] to [place] [address] as eligibility requirements were met for [place] [address]  Patient unenrolled from [place] [address] (BCR-ABL1 fusion positive) 6/1/2022  Consent signed for [place] [address] and patient enrolled 6/1/2022 (see eligibility checklist from 5/31/2022 and consent note from 6/1/2022)  Imatinib 400 mg PO QAM / 200 mg PO QPM started 6/3/2022 (allowed per [place] [address])  Patient completed the first 15 days of a Standard 4-drug Induction on 6/13/2022  Start of [place] [address](OS), Induction IA Part 2, Day 15 6/13/2022  End of Induction 1A Part 2 - MRD negative  Start of [place] [address](OS), Induction IA Part 2, Day 15 7/5/2022  Induction IB DELAYED 2 weeks 14 days from 7/26/2022-8/9/2022) for myelosuppression - Start of Day 22 cytarabine block 8/9/2022  Induction IB Day 42 delayed 9 days for myelosuppression - Day 42 evaluations 9/7/2022  End of Induction IB - Flow cytometric MRD negative, MRD by IgH-TCR PCR [id number]%  Randomization to AR-Experimental Arm B of [place] [address]  Start of AR-Experimental Arm B, Interim Maintenance 9/29/2022  Weight based increase in dose of imatinib to 400 mg PO AM and 250 mg PM 9/29/2022  Thrombocytopenia not permissive of proceeding with Day 15 of Interim Maintenance  AR-Experimental Arm B, Interim Maintenance, Day 15 delayed 4 days, start 10/17/2022  AR-Experimental Arm B, Interim Maintenance, Day 29, start 11/1/2022  AR-Experimental Arm B, Interim Maintenance, Day 43, start 11/14/2022  Last does of 6-MP 11/27/2022  AR-Experimental Arm B, Delayed Intensification, Day 1, start 12/6/2022  Admission with Severe Septic Shock 12/27/2022  Imatinib HELD 12/27/2022-1/8/2023  AR-Experimental Arm B, Delayed Intensification, Day 29 DELAYED 14 days with start 1/17/2023  Hospitalization 2/7/2023 on Day 50 of Delayed Intensification with left shoulder pain ultimately diagnosed with Bacteroides fragilis infection  AR-Experimental Arm B, Interim Maintenance, Day 1 DELAYED 7  "days with start 2/27/2023  AR-Experimental Arm B, Interim Maintenance, Day 11 DELAYED 4 days for platelets 43K on 3/9/2023  AR-Experimental Arm B, Interim Maintenance, Day 11 VCR given and MTX omitted for platelets 43K 3/13/2023  Imatinib HELD 3/30/2023-4/11/2023  AR-Experimental Arm B, Interim Maintenance, Day 21 (DELAYED 22 DAYS) - administered 4/11/2023 with methotrexate 80 mg/m² IV  AR-Experimental Arm B, Interim Maintenance, Day 31 (\"true\" Day 31 4/25/2023) - VCR and IT MTX given 4/28/2023 - IV MTX omitted  AR-Experimental Arm B, Interim Maintenance, Day 41 met with counts - administered 5/5/2023 with methotrexate 80 mg/m² IV     Start of Maintenance, Cycle 1, Day 1 -5/22/2023  Cranial radiation 6/5/2023-6/16/2023 (10 fractions)  Maintenance, Cycle 1, Day 29 - 6/23/2023 (no IT MTX given)  Maintenance, Cycle 1, Day 67, presented with fatigue nausea and vomiting found to have ANC less than 500  Methotrexate (oral) and mercaptopurine held 7/27/2023 - continued with imatinib  Methotrexate (oral) and mercaptopurine held 8/2/2023 - continued with imatinib  Methotrexate (oral) and mercaptopurine held 8/7/2023 - continued with imatinib  Restarted methotrexate (oral) and mercaptopurine at 100% previous dosing on 8/11/2023 - continued with imatinib  Maintenance, Cycle 2, Day 1 - 8/14/2023  Maintenance, Cycle 2, Day 29 - 9/11/2023  Methotrexate (oral) and mercaptopurine held 9/11/2023 - continued with imatinib  Methotrexate (oral) and mercaptopurine held 9/19/2023 - continued with imatinib  Methotrexate (oral) and mercaptopurine held 9/26/2023 - HELD imatinib  Methotrexate (oral) restarted at 50% dosing and mercaptopurine restarted at 50% dosing 9/29/2023 - RESTARTED imatinib  Maintenance, Cycle 2, Day 57 - 10/9/2023  Maintenance, Cycle 3, Day 1 - 11/6/2023: Methotrexate (oral) increased to 75% dosing and mercaptopurine increased to 75% dosing.  Imatinib increased to 600 mg QAM 11/6/2023  Maintenance Cycle 3, Day 29: " 12/4/2023 No changes made to the dosing of oral chemotherapy  Maintenance, Cycle 4, Day 1: No dose adjustments made however ANC slightly greater than >1500.  Oral chemotherapy did not need weight adjustment.  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  3) Hyperleukocytosis  4) Hyperuricemia  5) Hyperphosphatemia  6) Right Lower Extremity Superficial Thrombus  7) Subsegmental Pulmonary Embolism  8) 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  9) Bacteroides fragilis soft tissue infection left shoulder  10) Anxiety/Depression     Past Surgical History:     1) Temporary Right IJ Pharesis Catheter Placement 5/28/2022  2) Right-sided Port-A-Cath placement 8/29/2022  3) IR drainage left calf hematoma  4) Left shoulder I&D  5) Cranial XRT 6/5/2023-6/16/2023     Birth/Developmental History:   1st of three children  Unremarkable pregnancy  Unremarkable delivery     Family History:  No significant family history of cancer.  Both maternal and paternal family history of diabetes.     Immunizations:  UTD     Social History:   Lives with girlfriend.  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       Medications:   Current Outpatient Medications on File Prior to Encounter   Medication Sig Dispense Refill    sulfamethoxazole-trimethoprim (BACTRIM DS) 800-160 MG tablet Take 2 Tablets by mouth twice daily two days a week. (Patient taking differently: Take 2 Tablets by mouth twice daily two days a week. SATURDAYS AND SUNDAYS) 48 Tablet 11    hydrocortisone 1 % Cream Apply 1 Application topically 2 times a day. (Patient not taking: Reported on 8/8/2024) 28 g 2     No current facility-administered medications on file prior to encounter.     OBJECTIVE:     Vitals:   /70 (BP Location: Right arm, Patient Position: Sitting, BP Cuff Size: Adult)   Pulse 80   Temp  "37.3 °C (99.1 °F) (Temporal)   Ht 1.65 m (5' 4.96\")   Wt 76 kg (167 lb 8.8 oz)   SpO2 96%     Labs:    Hospital Outpatient Visit on 08/08/2024   Component Date Value    WBC 08/08/2024 5.1     RBC 08/08/2024 4.84     Hemoglobin 08/08/2024 15.1     Hematocrit 08/08/2024 45.5     MCV 08/08/2024 94.0     MCH 08/08/2024 31.2     MCHC 08/08/2024 33.2     RDW 08/08/2024 43.3     Platelet Count 08/08/2024 248     MPV 08/08/2024 9.1     Neutrophils-Polys 08/08/2024 63.30     Lymphocytes 08/08/2024 21.60 (L)     Monocytes 08/08/2024 11.80     Eosinophils 08/08/2024 2.50     Basophils 08/08/2024 0.60     Immature Granulocytes 08/08/2024 0.20     Nucleated RBC 08/08/2024 0.00     Neutrophils (Absolute) 08/08/2024 3.23     Lymphs (Absolute) 08/08/2024 1.10     Monos (Absolute) 08/08/2024 0.60     Eos (Absolute) 08/08/2024 0.13     Baso (Absolute) 08/08/2024 0.03     Immature Granulocytes (a* 08/08/2024 0.01     NRBC (Absolute) 08/08/2024 0.00     Sodium 08/08/2024 140     Potassium 08/08/2024 4.0     Chloride 08/08/2024 107     Co2 08/08/2024 21     Anion Gap 08/08/2024 12.0     Glucose 08/08/2024 98     Bun 08/08/2024 12     Creatinine 08/08/2024 0.59     Calcium 08/08/2024 9.5     Correct Calcium 08/08/2024 9.3     AST(SGOT) 08/08/2024 17     ALT(SGPT) 08/08/2024 15     Alkaline Phosphatase 08/08/2024 102 (H)     Total Bilirubin 08/08/2024 0.3     Albumin 08/08/2024 4.2     Total Protein 08/08/2024 7.6     Globulin 08/08/2024 3.4     A-G Ratio 08/08/2024 1.2     25-Hydroxy   Vitamin D 25 08/08/2024 20 (L)     GFR (CKD-EPI) 08/08/2024 140      Physical Exam:    Constitutional: Well-developed, well-nourished, and in no distress.  Well appearing.  HENT: Normocephalic and atraumatic. No nasal congestion or rhinorrhea. Oropharynx is clear and moist. No oral ulcerations or sores.    Eyes: Conjunctivae are normal. Pupils are equal, round. EOMI.  Non icteric.  Neck: Normal range of motion of neck, no adenopathy.    Cardiovascular: " Normal rate, regular rhythm and normal heart sounds.  No murmur heard. DP/radial pulses 2+, cap refill < 2 sec.  Pulmonary/Chest: Effort normal and breath sounds normal. No respiratory distress. Symmetric expansion.  No crackles or wheezes.  Abdomen: Soft. Bowel sounds are normal. No distension and no mass. There is no hepatosplenomegaly.    Genitourinary:  Deferred.  Musculoskeletal: Normal range of motion of lower and upper extremities bilaterally. Neurological: Alert and oriented to person and place. Exhibits normal muscle tone bilaterally in upper and lower extremities. Gait normal. Coordination normal.    Skin: Skin is warm, dry and pink.  No rash or evidence of skin infection.  No pallor.  Soles of feet are red and dry.  Psychiatric: Mood and affect normal for age.    ASSESSMENT AND PLAN:     Tomas Jean-Baptiste is a previously healthy 23 year old male with Precursor B-Cell Lymphoblastic Leukemia with BCR-ABL1 fusion and whose End of Induction IB MRD was both negative by flow cytometry and PCR who presents for scheduled two month off therapy visit.     1) Ph+ Precursor B-Cell Acute Lymphoblastic Leukemia, in MRD Remission:               - 2-3 weeks of symptoms  - Presenting WBC > 440 k/uL, hyperleukocytosis  - Start of Hydroxyurea (1500 mg PO Q8) 2320 on 5/27/2022  - discontinued after only 55 hours  - No steroid pretreatment  - 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  - Testicular status NEGATIVE       - Flow cytometry of both 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, FISH positive for BCR-ABL1 translocation  - Enrolled on [facility] [id number]  - Initially enrolled on [facility] [id number] - but 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                            - Enrolled on [facility] [id number] and began study 6/13/2022              - Started imatinib therapy 6/3/2022   - End of Induction IA and IB MRD negative  - On Imatinib and completed therapy  - Received whole brain  radiation therapy from 6/5/2024 until 6/16/2024: 1800 cGy     [id number](OS), AR Arm B, 1 month Off-therapy   - Complete therapy 5/30/2024  - End of therapy bone study and ECHO obtained on 6/5/2024     2) Myelosuppression Secondary To Chemotherapy (RESOLVED):            - WBC 5100/microliters, Hgb 15.1 gm/dL, platelets 248,000/microliters           - ANC 3230/microliters, ALC 1100/microliters     3) At Risk of Opportunistic Lung Infection:  - Continue Bactrim DS PO BID Sat and Sun for PJP prophylaxis (until 4 months and CD4 count normalizes)     4) 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 Patient wants to currently hold off            - Starting to affect quality of life     5) Rash:           - Peeling skin with raised rash : dry skin           - Prescribed hydrocortisone 1% cream            - Consider fungal infection if no improvement as area moist vs palmoplantar erythrodysthesia although currently not on any chemotherapy     6) Access:   - R Port-A-Cath removed on 5/20/2024       7) Survivorship/Late Effects Monitoring:              - Cognitive:  Internship doing well.  Will return to [employer] in fall.              - Psychosocial:  Dealing with family issues, but seem to be coping ok.              - Renal:  /70 mm Hg.  Creatinine 0.59 mg/dL, Electrolytes: WNL. Will continue to monitor              - Cardiac: Daunorubicin 100 mg/m2 and Doxorubicin 75 mg/m2. Hence, cumulative  Doxorubicin equivalent isotoxic dose is 158.3 mg/m2.  No radiation.  Given low-dose anthracycline without radiation to chest, recommendation for ECHO every 5 years.  Next ECHO in 2029              - Pulmonary:  No chest/mediastinal radiation, no exposure to chemotherapies with pulmonary toxicity.              - Musckuloskeletal: Bone age obtained on 6/5/2024: No concerns. Vitamin D 20.  Encourage supplementation.              - Growth and Development: Weight improving. No concerns.               - Reproductive: Has a girlfriend. Knows to practice safe sex. Consider testing AM  testosterone, LH, FSH, and inhibin level. FSH and Inhibin level can be used to assess ability to produce sperm.              - Candace: 90     8) Health Maintenance:             - Encouraged to FU with dentists and optometrist/ophthalmologist 6 months to yearly             - Encouraged to get FLU/Covid vaccine (have to wait atleast 1 year post completion of therapy to receive any other vaccinations)       Disposition: RTC in one month     Pepe Faye MD  Pediatric Hematology / Oncology  [facility]  Cell.  [phone]  Office. [phone]

## 2024-08-12 ENCOUNTER — TELEMEDICINE (OUTPATIENT)
Dept: PSYCHOLOGY | Facility: MEDICAL CENTER | Age: 24
End: 2024-08-12
Attending: PSYCHOLOGIST
Payer: MEDICAID

## 2024-08-12 DIAGNOSIS — C91.Z0 B LYMPHOBLASTIC LEUKEMIA WITH T(9;22)(Q34;Q11.2);BCR-ABL1 (HCC): ICD-10-CM

## 2024-08-12 PROCEDURE — 96159 HLTH BHV IVNTJ INDIV EA ADDL: CPT | Performed by: PSYCHOLOGIST

## 2024-08-12 PROCEDURE — 96158 HLTH BHV IVNTJ INDIV 1ST 30: CPT | Performed by: PSYCHOLOGIST

## 2024-08-13 DIAGNOSIS — Z91.89 AT RISK FOR OPPORTUNISTIC INFECTIONS: ICD-10-CM

## 2024-08-13 PROCEDURE — RXMED WILLOW AMBULATORY MEDICATION CHARGE: Performed by: PEDIATRICS

## 2024-08-13 RX ORDER — SULFAMETHOXAZOLE/TRIMETHOPRIM 800-160 MG
2 TABLET ORAL
Qty: 48 TABLET | Refills: 11 | Status: SHIPPED | OUTPATIENT
Start: 2024-08-17

## 2024-08-13 NOTE — PROGRESS NOTES
PEDIATRIC BEHAVIORAL HEALTH VISIT    ADULT REQUEST FOR CONFIDENTIALITY    Name:  Tomas Jean-Baptiste  MRN:  [id number]  :  2000  Age:  23 y.o.  Referring Provider: Dr. Faye (Hem/Onc)  Pediatrician:  Margarito Arvizu M.D.  Date of Service:  2024    Discussed with patient: You have chosen to receive care through the use of secure virtual/telemedicine. This platform enables health care providers at different locations to provide safe, effective, and convenient care through the use of technology. As with any health care service, there are risks associated with the use of this platform, including equipment failure, poor image resolution or no image, and  issues. You also understand that I cannot physically examine you and that you may need to come to clinic to complete the assessment.    Patient verbally asserts understanding of the risks and benefits of telemedicine as explained. Endorses all questions answered regarding telemedicine.     I conducted this encounter from [facility] via secure, live, telephone or audio/video conference with the patient. Patient was located in [place], [place]. Prior to the interview, the risks and benefits of telemedicine were discussed with the patient and verbal consent was obtained.     *Session needed to be done by phone as this provider was unable to join through Teams due to technical difficulties. Patient has also previously had poor connection using Teams for video visit.    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 See intake note dated 23    Mental Status Exam:   General description In no apparent  distress, well-groomed, appropriately attired, well-nourished, and cooperative with interview  Interactional style Culturally appropriate  Eye contact Normal and appropriate for culture  Speech Unimpaired, fluid and clear, normal rate and rythem  Motor activity Normal motor activity  Orientation Oriented to person time, place and situation  Intellectual functioning Unimpaired  Memory Unimpaired  Attention and concentration Intact and normative concentration  Fund of knowledge Average  Mood Generally euthymic  Affect Appropriate  Perceptual Disturbances None apparent  Thought Process  No abnormalities apparent       Associations Unimpaired associations       Abstractions Normal abstractions, intact       Insight Insight - adequate and normative       Judgment Judgments - intact and normative   Thought Content  No apparent delusions    Risk Assessment:  Tomas Roy denied current concerns regarding risk to self or others.       Issues Discussed:   This provider met with JULY to continue assisting in rediscovering himself after the trauma of his cancer diagnosis and treatment as well as how childhood impacts this. Today JULY processed his continued thoughts and feelings around his mother's actions and the pull between wanting to be with his family again, but feeling he is not welcome. We reviewed his thoughts about bell ringing and the grief process he is experiencing.     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 After meeting with JULY during his intake, it appears he could benefit from learning anxiety management skills, processing what he has been  through, and how to live the life he wants. Tomas Jean-Baptiste could benefit from learning appropriate ways of expressing and coping with his emotions. He could also benefit from learning Cognitive Behavioral Therapy (CBT) and Acceptance and Commitment Therapy (ACT) tools to understand the interaction of thoughts, feelings, and actions. As well as Trauma Focused-CBT to process his cancer journey. Tomas Jean-Baptiste agreed with the plan.       PLAN  JULY will learn and utilize appropriate ways to express and cope with emotions.    He will learn the connection between thoughts, feelings, and actions utilizing CBT and ACT tools.,   Processed the thoughts that arise around his mother and her actions.  Reviewed the grief process and reasons why events keep pulling him back.   JULY will process how their medical diagnosis is impacting their life.    Next visit we will spend time processing how ending treatment has felt and thoughts about bell ringing.     The above diagnostic impressions, recommendations, and treatment plan were discussed with and agreed upon by Tomas Roy, and his caregivers. Care will be coordinated with Tomas Roy's healthcare team, as appropriate.    Total time spent on encounter was 60 minutes.    Laurie Ortega, PhD  Pediatric Psychologist   Licensed Psychologist, [place] # [id number]  [facility], Behavioral Health

## 2024-08-16 ENCOUNTER — PHARMACY VISIT (OUTPATIENT)
Dept: PHARMACY | Facility: MEDICAL CENTER | Age: 24
End: 2024-08-16
Payer: COMMERCIAL

## 2024-08-26 ENCOUNTER — TELEPHONE (OUTPATIENT)
Dept: PEDIATRIC NEPHROLOGY | Facility: MEDICAL CENTER | Age: 24
End: 2024-08-26
Payer: MEDICAID

## 2024-08-26 DIAGNOSIS — Z08 ROUTINE CANCER FOLLOW-UP VISIT: ICD-10-CM

## 2024-08-26 NOTE — TELEPHONE ENCOUNTER
Hi,  Patient has an up coming appointment with Dr. Ortega,  if you could please send over a referral for continuation of care at your earliest convince.Thank you

## 2024-09-05 ENCOUNTER — APPOINTMENT (OUTPATIENT)
Dept: PEDIATRIC HEMATOLOGY/ONCOLOGY | Facility: MEDICAL CENTER | Age: 24
End: 2024-09-05
Payer: MEDICAID

## 2024-09-10 ENCOUNTER — HOSPITAL ENCOUNTER (OUTPATIENT)
Dept: PEDIATRIC HEMATOLOGY/ONCOLOGY | Facility: MEDICAL CENTER | Age: 24
End: 2024-09-10
Attending: PEDIATRICS
Payer: MEDICAID

## 2024-09-10 ENCOUNTER — HOSPITAL ENCOUNTER (OUTPATIENT)
Facility: MEDICAL CENTER | Age: 24
End: 2024-09-10
Attending: PEDIATRICS
Payer: MEDICAID

## 2024-09-10 VITALS
HEART RATE: 87 BPM | WEIGHT: 167.11 LBS | DIASTOLIC BLOOD PRESSURE: 71 MMHG | HEIGHT: 65 IN | BODY MASS INDEX: 27.84 KG/M2 | OXYGEN SATURATION: 96 % | SYSTOLIC BLOOD PRESSURE: 136 MMHG | TEMPERATURE: 98.6 F

## 2024-09-10 DIAGNOSIS — Z08 ROUTINE CANCER FOLLOW-UP VISIT: ICD-10-CM

## 2024-09-10 LAB
ALBUMIN SERPL BCP-MCNC: 4.6 G/DL (ref 3.2–4.9)
ALBUMIN/GLOB SERPL: 1.4 G/DL
ALP SERPL-CCNC: 109 U/L (ref 30–99)
ALT SERPL-CCNC: 18 U/L (ref 2–50)
ANION GAP SERPL CALC-SCNC: 17 MMOL/L (ref 7–16)
AST SERPL-CCNC: 23 U/L (ref 12–45)
BASOPHILS # BLD AUTO: 0.6 % (ref 0–1.8)
BASOPHILS # BLD: 0.03 K/UL (ref 0–0.12)
BILIRUB SERPL-MCNC: 0.5 MG/DL (ref 0.1–1.5)
BUN SERPL-MCNC: 12 MG/DL (ref 8–22)
CALCIUM ALBUM COR SERPL-MCNC: 9.3 MG/DL (ref 8.5–10.5)
CALCIUM SERPL-MCNC: 9.8 MG/DL (ref 8.5–10.5)
CHLORIDE SERPL-SCNC: 102 MMOL/L (ref 96–112)
CO2 SERPL-SCNC: 19 MMOL/L (ref 20–33)
CREAT SERPL-MCNC: 0.64 MG/DL (ref 0.5–1.4)
EOSINOPHIL # BLD AUTO: 0.04 K/UL (ref 0–0.51)
EOSINOPHIL NFR BLD: 0.8 % (ref 0–6.9)
ERYTHROCYTE [DISTWIDTH] IN BLOOD BY AUTOMATED COUNT: 40.3 FL (ref 35.9–50)
GFR SERPLBLD CREATININE-BSD FMLA CKD-EPI: 136 ML/MIN/1.73 M 2
GLOBULIN SER CALC-MCNC: 3.3 G/DL (ref 1.9–3.5)
GLUCOSE SERPL-MCNC: 86 MG/DL (ref 65–99)
HCT VFR BLD AUTO: 47.8 % (ref 42–52)
HGB BLD-MCNC: 16.5 G/DL (ref 14–18)
IMM GRANULOCYTES # BLD AUTO: 0.02 K/UL (ref 0–0.11)
IMM GRANULOCYTES NFR BLD AUTO: 0.4 % (ref 0–0.9)
LYMPHOCYTES # BLD AUTO: 1.04 K/UL (ref 1–4.8)
LYMPHOCYTES NFR BLD: 20.4 % (ref 22–41)
MCH RBC QN AUTO: 30.6 PG (ref 27–33)
MCHC RBC AUTO-ENTMCNC: 34.5 G/DL (ref 32.3–36.5)
MCV RBC AUTO: 88.5 FL (ref 81.4–97.8)
MONOCYTES # BLD AUTO: 0.55 K/UL (ref 0–0.85)
MONOCYTES NFR BLD AUTO: 10.8 % (ref 0–13.4)
NEUTROPHILS # BLD AUTO: 3.43 K/UL (ref 1.82–7.42)
NEUTROPHILS NFR BLD: 67 % (ref 44–72)
NRBC # BLD AUTO: 0 K/UL
NRBC BLD-RTO: 0 /100 WBC (ref 0–0.2)
PLATELET # BLD AUTO: 268 K/UL (ref 164–446)
PMV BLD AUTO: 9.1 FL (ref 9–12.9)
POTASSIUM SERPL-SCNC: 3.9 MMOL/L (ref 3.6–5.5)
PROT SERPL-MCNC: 7.9 G/DL (ref 6–8.2)
RBC # BLD AUTO: 5.4 M/UL (ref 4.7–6.1)
SODIUM SERPL-SCNC: 138 MMOL/L (ref 135–145)
WBC # BLD AUTO: 5.1 K/UL (ref 4.8–10.8)

## 2024-09-10 PROCEDURE — 36415 COLL VENOUS BLD VENIPUNCTURE: CPT

## 2024-09-10 PROCEDURE — 99213 OFFICE O/P EST LOW 20 MIN: CPT | Performed by: PEDIATRICS

## 2024-09-10 PROCEDURE — 80053 COMPREHEN METABOLIC PANEL: CPT

## 2024-09-10 PROCEDURE — 85025 COMPLETE CBC W/AUTO DIFF WBC: CPT

## 2024-09-10 ASSESSMENT — FIBROSIS 4 INDEX: FIB4 SCORE: 0.39

## 2024-09-10 NOTE — PROGRESS NOTES
Pt to [facility] for lab draw and DrReid visit. Labs drawn from the left ac without difficulty / with 1 attempt.   Pt tolerated well.  Visit with Dr. Faye completed. No further orders. Plan to follow up in 1 month.

## 2024-09-13 NOTE — PROGRESS NOTES
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 the time demonstrated a WBC of 0.4, Hgb 8.8 and platelets 125.  ANC at the time was measured at only 170.  JULY was otherwise clinically well appearing.  He did receive a one-time normal saline bolus for his nausea and vomiting and was sent home with instructions to HOLD his oral mercaptopurine and oral methotrexate which began being held on 7/28/2023.  Per protocol, imatinib was continued at previous dose of 600 mg in the morning. Tomas Roy would return to clinic again on 8/2/2023 and 8/7/2023.  On both occasions, ANC remained under 500 and oral therapy (with the exception of imatinib) continue to be held while awaiting count recovery.  Ultimately, on 8/11/2023 after 14 days of therapy being held, Tomas Roy returned to clinic and WBC had increased to 2.1 with ANC increasing to 1500 with an absolute monocyte count of 290. Tomas  Kirill was then instructed to resume his oral chemotherapy at 100% of prior dosing with (due to timing with Day 1 of Cycle 2 with LP 3 days later, no weekly MTX was administered).  Imatinib was never held.  On 8/14/2023 he was seen in clinic for Day 1 of Cycle 2 of Maintenance with lumbar puncture, vincristine, and 5-day pulse of steroids.  At the time, his ANC was 2010 and his oral chemotherapy was continued at 100% dosing.  Given his history of previously drop in counts, he was scheduled to come back for repeat labs on 8/29/2023 at which point his WBC count was 1.4 and his ANC remained greater than 500 at 1140.  Again, his therapy was continued with imatinib 550 mg by mouth in the morning as well as 100% dosing of methotrexate and 100% dosing of 6-mercaptopurine.  When Tomas Roy returned to clinic on 9/11/2023 for his Maintenance, Cycle 2, Day 29 therapy he was noted to have had another drop in his WBC to 600 and his ANC accordingly was also decreased at 410.  He did receive vincristine in accordance with protocol as well as starting a 5-day pulse of prednisone per protocol.  Given however that his ANC had dropped below 500 his oral methotrexate and oral 6-MP were again held.  He was instructed return to clinic 1 week later for lab evaluations.  On 9/19/2023 he returned to clinic and WBC had improved slightly to 0.8 however ANC remained low at 260 with an absolute monocyte count of 220.  Given that counts not recovered his oral chemotherapy remained held for an additional week.  On 9/26/2023 at 14 days after initially holding chemotherapy, he was seen back in clinic at which time WBC improved again to 1.1 however ANC did not quite recover and remained at 490 with an absolute monocyte count of 330.  Given that 2 weeks had elapsed with myelosuppression, imatinib was held in addition to oral 6-MP and methotrexate. Tomas Roy was instructed to return to clinic on 9/29/2023 for repeat counts.  On  9/29/2023 WBC had improved to 2.4 and ANC had finally recovered with 1530 and an absolute monocyte count of 510.  Given a recovery with ANC greater than 750 and platelets greater than 75, oral chemotherapy was restarted at 50% of initial dosing and imatinib was restarted at 100% of initial dosing.  On 10/9/2023, Tomas Roy returned to clinic for his Day 57 of Cycle 2 visit.  At the time of his visit, his ANC had recovered after holding chemotherapy and then restarting at 50% dosing 11 days prior.  He did however in clinic spiked a temperature 102.5 °F.  For this reason despite his ANC being improved, no dose adjustments were made in his chemotherapy.  He continued with 50% dosing with 100% adherence of his 6-MP and methotrexate as well as his imatinib at 550 mg PO QHS.   Tomas Roy was then seen in clinic again on 11/6/2023 for his Day 1 of Cycle 3 of Maintenance therapy.  At the time, his imatinib dose was adjusted back up to 600 mg daily taken in the morning.  Additionally, his methotrexate was adjusted back up to 75% of expected dosing and his mercaptopurine was increased to 75% of expected dosing as well.  He will return to clinic on 12/4/2023 for his Day 29 of Cycle 3 therapy.  At the time, his ANC was exactly 1500 and therefore no dose adjustments were made and he continued at 75% dosing for oral chemotherapy as well as the imatinib dose of 600 mg daily.  On 1/2/2024 he was seen for his Day 57 evaluations and his ANC had dropped to 1450 again not prompting any change in oral chemotherapy dosing.  Tomas Roy completed his Cycle 4 chemotherapy without any adverse events or complications.  He did not have any changes in medications either.  Most recently,  Tomas Roy was seen in clinic on 4/22/2023 for his Day 1 of Cycle 5 chemotherapy.  At the time, a lumbar puncture was performed and IT chemotherapy was provided.  He tolerated the procedure and the therapy well without any sequelae.  His  ANC at the time was > 1500 however the dose adjustment was made as this was the first ANC greater than 1500 and Tomas Roy had a history of precipitous drops in his counts with increases in his oral chemotherapy.  On 5/20/2024, Tomas Roy presented to clinic for his Cycle 5, Day 29 therapy and evaluations. At that time, he was started on 5 day pulse of prednisone and his oral methotrexate dose was increased to 13 tablets PO weekly (90.78% of expected dosing). His port was removed on 5/20/2024 by Dr. Moise. He completed therapy on 5/30/2024. Tomas Roy was most recently seen in clinic on 8/8/2024 for his two month off therapy visit.   At the time his primary concern was with his neuropathies in his feet.  He had been prescribed hydrocortisone for painful rash on his feet.  Interval history is remarkable for the improvement in the rash.      Currently, Tomas Roy reports that he is in good clinical health without any signs or symptoms of acute illness.  No complaints of any headaches, changes in visino or neurologic changes.  No complaints of any shortness of breath, cough or chest pain.  No nausea, vomiting, abdominal discomfort or pain.  No skin changes or rashes.      Review of Systems:     Constitutional: Afebrile.  Without recent illness.  Energy and activity are good.   HENT: Negative for ear pain, nasal congestion or rhinorrhea, nosebleeds and sore throat.  No mouth sores.  Eyes: Negative for visual changes.  Respiratory: Negative for shortness of breath or noisy breathing.   Cardiovascular: Negative for chest pain or extremity swelling.    Gastrointestinal: Negative for nausea, vomiting, abdominal pain, diarrhea, constipation or blood in stool.    Genitourinary: Negative for painful urination, blood in urine or flank pain.    Musculoskeletal: Negative for joint or muscle pains.    Skin: Negative for rash, signs of infection.  Neurological: Negative for numbness, tingling, sensory  changes, weakness or headaches.    Endo/Heme/Allergies: Does not bruise/bleed easily.    Psychiatric/Behavioral: No changes in mood, appropriate for age.     PAST MEDICAL HISTORY:     Oncologic History:  2-3 week history of worsening fatigue, right lower extremity pain  Presentation to OS and diagnosed with right LE superficial thrombus, subsegmental PE and hyperleukocytosis, anemia and thrombocytopenia  Transferred to [facility] for [facility] care  Presenting (local) WBC > 440K, Hgb 10.0, platelets 53, (automated differential ANC 3190, ALC 75,310, absolute monocyte count 20186, absolute blast count 340,560)  Uric Acid 15.6, phosphorus 5.6, LDH 1114  Rasburicase x 1 dose given   Peripheral Blood flow cytometry demonstrating CD10 pos, CD19 pos, CD20 neg, CD22 dim (60%) 5/28/2022  Peripheral blood FISH for BCR-ABL1 positive in 98% of analyzed cells     Age at Diagnosis: 21 years  White Blood Cell Count at Presentation: > 440 k/uL  Testicular Disease Status: Negative (see procedure note 5/30/2022)  CNS Status: 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, diagnostic LP with WBC 1, RBC 3 and no evidence of leukemic blasts 5/30/2022  Steroid Pre-treatment: None  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     All inclusion/exclusion criteria for [id number] met and consent signed - enrolled 5/29/2022   All inclusion/exclusion criteria for [id number] met and consent signed - enrolled 5/30/2022  Confirmatory bone marrow aspirate and biopsy and diagnostic LP + cytarabine 70 mg IT 5/30/2022  Induction therapy (ON STUDY [id number]) started 5/30/2022  Bone marrow immunohistochemistry consistent with diagnosis of B-ALL comprising 90% of marrow cellularity  Bone marrow sample sent to [facility] for COG purposes:  Flow cytom  Of the blood pressure little high that is a problem is a cultural problem is well and cultural genetic and everything else like that unfortunately  breathalyzers such bad heart disease diabetes things like that  populations etry consistent with peripheral blood, cytogenetics remarkable for known t(9;22)  CSF with WBC 1, RBC 3, no blasts identified on cytospin  FISH results available 5/31/2022 making patient eligible for transfer from [place] [address] to [place] [address] as eligibility requirements were met for [place] [address]  Patient unenrolled from [place] [address] (BCR-ABL1 fusion positive) 6/1/2022  Consent signed for [place] [address] and patient enrolled 6/1/2022 (see eligibility checklist from 5/31/2022 and consent note from 6/1/2022)  Imatinib 400 mg PO QAM / 200 mg PO QPM started 6/3/2022 (allowed per [place] [address])  Patient completed the first 15 days of a Standard 4-drug Induction on 6/13/2022  Start of [place] [address](OS), Induction IA Part 2, Day 15 6/13/2022  End of Induction 1A Part 2 - MRD negative  Start of [place] [address](OS), Induction IA Part 2, Day 15 7/5/2022  Induction IB DELAYED 2 weeks 14 days from 7/26/2022-8/9/2022) for myelosuppression - Start of Day 22 cytarabine block 8/9/2022  Induction IB Day 42 delayed 9 days for myelosuppression - Day 42 evaluations 9/7/2022  End of Induction IB - Flow cytometric MRD negative, MRD by IgH-TCR PCR [id number]%  Randomization to AR-Experimental Arm B of [place] [address]  Start of AR-Experimental Arm B, Interim Maintenance 9/29/2022  Weight based increase in dose of imatinib to 400 mg PO AM and 250 mg PM 9/29/2022  Thrombocytopenia not permissive of proceeding with Day 15 of Interim Maintenance  AR-Experimental Arm B, Interim Maintenance, Day 15 delayed 4 days, start 10/17/2022  AR-Experimental Arm B, Interim Maintenance, Day 29, start 11/1/2022  AR-Experimental Arm B, Interim Maintenance, Day 43, start 11/14/2022  Last does of 6-MP 11/27/2022  AR-Experimental Arm B, Delayed Intensification, Day 1, start 12/6/2022  Admission with Severe Septic Shock 12/27/2022  Imatinib HELD 12/27/2022-1/8/2023  AR-Experimental Arm B, Delayed Intensification,  "Day 29 DELAYED 14 days with start 1/17/2023  Hospitalization 2/7/2023 on Day 50 of Delayed Intensification with left shoulder pain ultimately diagnosed with Bacteroides fragilis infection  AR-Experimental Arm B, Interim Maintenance, Day 1 DELAYED 7 days with start 2/27/2023  AR-Experimental Arm B, Interim Maintenance, Day 11 DELAYED 4 days for platelets 43K on 3/9/2023  AR-Experimental Arm B, Interim Maintenance, Day 11 VCR given and MTX omitted for platelets 43K 3/13/2023  Imatinib HELD 3/30/2023-4/11/2023  AR-Experimental Arm B, Interim Maintenance, Day 21 (DELAYED 22 DAYS) - administered 4/11/2023 with methotrexate 80 mg/m² IV  AR-Experimental Arm B, Interim Maintenance, Day 31 (\"true\" Day 31 4/25/2023) - VCR and IT MTX given 4/28/2023 - IV MTX omitted  AR-Experimental Arm B, Interim Maintenance, Day 41 met with counts - administered 5/5/2023 with methotrexate 80 mg/m² IV     Start of Maintenance, Cycle 1, Day 1 -5/22/2023  Cranial radiation 6/5/2023-6/16/2023 (10 fractions)  Maintenance, Cycle 1, Day 29 - 6/23/2023 (no IT MTX given)  Maintenance, Cycle 1, Day 67, presented with fatigue nausea and vomiting found to have ANC less than 500  Methotrexate (oral) and mercaptopurine held 7/27/2023 - continued with imatinib  Methotrexate (oral) and mercaptopurine held 8/2/2023 - continued with imatinib  Methotrexate (oral) and mercaptopurine held 8/7/2023 - continued with imatinib  Restarted methotrexate (oral) and mercaptopurine at 100% previous dosing on 8/11/2023 - continued with imatinib  Maintenance, Cycle 2, Day 1 - 8/14/2023  Maintenance, Cycle 2, Day 29 - 9/11/2023  Methotrexate (oral) and mercaptopurine held 9/11/2023 - continued with imatinib  Methotrexate (oral) and mercaptopurine held 9/19/2023 - continued with imatinib  Methotrexate (oral) and mercaptopurine held 9/26/2023 - HELD imatinib  Methotrexate (oral) restarted at 50% dosing and mercaptopurine restarted at 50% dosing 9/29/2023 - RESTARTED " imatinib  Maintenance, Cycle 2, Day 57 - 10/9/2023  Maintenance, Cycle 3, Day 1 - 11/6/2023: Methotrexate (oral) increased to 75% dosing and mercaptopurine increased to 75% dosing.  Imatinib increased to 600 mg QAM 11/6/2023  Maintenance Cycle 3, Day 29: 12/4/2023 No changes made to the dosing of oral chemotherapy  Maintenance, Cycle 4, Day 1: No dose adjustments made however ANC slightly greater than >1500.  Oral chemotherapy did not need weight adjustment.  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  3) Hyperleukocytosis  4) Hyperuricemia  5) Hyperphosphatemia  6) Right Lower Extremity Superficial Thrombus  7) Subsegmental Pulmonary Embolism  8) 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  9) Bacteroides fragilis soft tissue infection left shoulder  10) Anxiety/Depression     Past Surgical History:     1) Temporary Right IJ Pharesis Catheter Placement 5/28/2022  2) Right-sided Port-A-Cath placement 8/29/2022  3) IR drainage left calf hematoma  4) Left shoulder I&D  5) Cranial XRT 6/5/2023-6/16/2023     Birth/Developmental History:   1st of three children  Unremarkable pregnancy  Unremarkable delivery     Family History:  No significant family history of cancer.  Both maternal and paternal family history of diabetes.     Immunizations:  UTD     Social History:   Lives with girlfriend.  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      Medications:   Current Outpatient Medications on File Prior to Encounter   Medication Sig Dispense Refill    sulfamethoxazole-trimethoprim (BACTRIM DS) 800-160 MG tablet Take 2 Tablets by mouth twice daily two days a week. 48 Tablet 11    hydrocortisone 1 % Cream Apply 1 Application topically 2 times a day. 28 g 2     No current facility-administered medications on file prior to encounter.     OBJECTIVE:     Vitals:  "  /71 (BP Location: Right arm, Patient Position: Sitting, BP Cuff Size: Adult)   Pulse 87   Temp 37 °C (98.6 °F) (Temporal)   Ht 1.643 m (5' 4.67\")   Wt 75.8 kg (167 lb 1.7 oz)   SpO2 96%     Labs:    Hospital Outpatient Visit on 09/10/2024   Component Date Value    WBC 09/10/2024 5.1     RBC 09/10/2024 5.40     Hemoglobin 09/10/2024 16.5     Hematocrit 09/10/2024 47.8     MCV 09/10/2024 88.5     MCH 09/10/2024 30.6     MCHC 09/10/2024 34.5     RDW 09/10/2024 40.3     Platelet Count 09/10/2024 268     MPV 09/10/2024 9.1     Neutrophils-Polys 09/10/2024 67.00     Lymphocytes 09/10/2024 20.40 (L)     Monocytes 09/10/2024 10.80     Eosinophils 09/10/2024 0.80     Basophils 09/10/2024 0.60     Immature Granulocytes 09/10/2024 0.40     Nucleated RBC 09/10/2024 0.00     Neutrophils (Absolute) 09/10/2024 3.43     Lymphs (Absolute) 09/10/2024 1.04     Monos (Absolute) 09/10/2024 0.55     Eos (Absolute) 09/10/2024 0.04     Baso (Absolute) 09/10/2024 0.03     Immature Granulocytes (a* 09/10/2024 0.02     NRBC (Absolute) 09/10/2024 0.00     Sodium 09/10/2024 138     Potassium 09/10/2024 3.9     Chloride 09/10/2024 102     Co2 09/10/2024 19 (L)     Anion Gap 09/10/2024 17.0 (H)     Glucose 09/10/2024 86     Bun 09/10/2024 12     Creatinine 09/10/2024 0.64     Calcium 09/10/2024 9.8     Correct Calcium 09/10/2024 9.3     AST(SGOT) 09/10/2024 23     ALT(SGPT) 09/10/2024 18     Alkaline Phosphatase 09/10/2024 109 (H)     Total Bilirubin 09/10/2024 0.5     Albumin 09/10/2024 4.6     Total Protein 09/10/2024 7.9     Globulin 09/10/2024 3.3     A-G Ratio 09/10/2024 1.4     GFR (CKD-EPI) 09/10/2024 136      Physical Exam:    Constitutional: Well-developed, well-nourished, and in no distress.  Well appearing.  HENT: Normocephalic and atraumatic. No nasal congestion or rhinorrhea. Oropharynx is clear and moist. No oral ulcerations or sores.    Eyes: Conjunctivae are normal. Pupils are equal, round, and reactive to light.  "   Neck: Normal range of motion of neck, no adenopathy.    Cardiovascular: Normal rate, regular rhythm and normal heart sounds.  No murmur heard. DP/radial pulses 2+, cap refill < 2 sec  Pulmonary/Chest: Effort normal and breath sounds normal. No respiratory distress. Symmetric expansion.  No crackles or wheezes.  Abdomen: Soft. Bowel sounds are normal. No distension and no mass. There is no hepatosplenomegaly.    Genitourinary:  Deferred  Musculoskeletal: Normal range of motion of lower and upper extremities bilaterally. No tenderness to palpation of elbows, wrists, hands, knees, ankles and feet bilaterally.   Lymphadenopathy: No cervical adenopathy, axillary adenopathy or inguinal adenopathy.   Neurological: Alert and oriented to person and place. Exhibits normal muscle tone bilaterally in upper and lower extremities. Gait normal. Coordination normal.    Skin: Skin is warm, dry and pink.  No rash or evidence of skin infection.  No pallor.   Psychiatric: Mood and affect normal for age.    ASSESSMENT AND PLAN:     Tomas Jean-Baptiste is a previously healthy 23 year old male with Precursor B-Cell Lymphoblastic Leukemia with BCR-ABL1 fusion and whose End of Induction IB MRD was both negative by flow cytometry and PCR who presents for scheduled 3  month off therapy visit     1) Ph+ Precursor B-Cell Acute Lymphoblastic Leukemia, in MRD Remission:               - 2-3 weeks of symptoms  - Presenting WBC > 440 k/uL, hyperleukocytosis  - Start of Hydroxyurea (1500 mg PO Q8) 2320 on 5/27/2022  - discontinued after only 55 hours  - No steroid pretreatment  - 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  - Testicular status NEGATIVE       - Flow cytometry of both 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, FISH positive for BCR-ABL1 translocation  - Enrolled on [facility] [id number]  - Initially enrolled on [facility] [id number] - but 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                             - [place] [facility] [id number] and began study 6/13/2022              - Started imatinib therapy 6/3/2022   - End of Induction IA and IB MRD negative  - On Imatinib and completed therapy  - Received whole brain radiation therapy from 6/5/2024 until 6/16/2024: 1800 cGy      [id number](OS), AR Arm B, 3 months off-therapy   - Complete therapy 5/30/2024  - End of therapy bone study and ECHO obtained on 6/5/2024     2) Myelosuppression Secondary To Chemotherapy (RESOLVED):            - WBC 5100/microliters, Hgb 16.5 gm/dL, platelets 268,000/microliters           - ANC 3430/microliters, ALC 1040/microliters     3) At Risk of Opportunistic Lung Infection:  - Continue Bactrim DS PO BID Sat and Sun for PJP prophylaxis (until 4 months and CD4 count normalizes)     4) 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  Now back in school full time and               - Psychosocial:  Dealing with family issues, but seem to be coping ok.  Father recently back in [facility] life.              - Renal:  /71 mm Hg.  Creatinine 0.64 mg/dL, Electrolytes: WNL. Will continue to monitor              - Cardiac: Daunorubicin 100 mg/m2 and Doxorubicin 75 mg/m2. Hence, cumulative  Doxorubicin equivalent isotoxic dose is 158.3 mg/m2.  No radiation.  Given low-dose anthracycline without radiation to chest, recommendation for ECHO every 5 years.  Next ECHO in 2029              - Pulmonary:  No chest/mediastinal radiation, no exposure to chemotherapies with pulmonary toxicity.              - Musckuloskeletal: Bone age obtained on 6/5/2024: No concerns. Vitamin D 20 on 8/8/2024.  Encourage supplementation.              - Growth and Development: Weight improving. No concerns.              - Reproductive: Has  a girlfriend. Knows to practice safe sex. Consider testing AM testosterone, LH, FSH, and inhibin level. FSH and Inhibin level can be used to assess ability to produce sperm.              - Candace: 90     8) Health Maintenance:             - Encouraged to FU with dentists and optometrist/ophthalmologist 6 months to yearly             - Encouraged to get FLU/Covid vaccine (have to wait atleast 1 year post completion of therapy to receive any other vaccinations)       Disposition: RTC in one month      Pepe Faye MD  Pediatric Hematology / Oncology  [facility]  Cell.  [phone]  Office. [phone]

## 2024-09-17 ENCOUNTER — TELEMEDICINE (OUTPATIENT)
Dept: PSYCHOLOGY | Facility: MEDICAL CENTER | Age: 24
End: 2024-09-17
Attending: PSYCHOLOGIST
Payer: MEDICAID

## 2024-09-17 DIAGNOSIS — C91.Z0 B LYMPHOBLASTIC LEUKEMIA WITH T(9;22)(Q34;Q11.2);BCR-ABL1 (HCC): ICD-10-CM

## 2024-09-17 PROCEDURE — 96158 HLTH BHV IVNTJ INDIV 1ST 30: CPT | Performed by: PSYCHOLOGIST

## 2024-09-17 PROCEDURE — 96159 HLTH BHV IVNTJ INDIV EA ADDL: CPT | Performed by: PSYCHOLOGIST

## 2024-09-17 NOTE — PROGRESS NOTES
PEDIATRIC BEHAVIORAL HEALTH VISIT    ADULT REQUEST FOR CONFIDENTIALITY    Name:  Tomas Jean-Baptiste  MRN:  [id number]  :  2000  Age:  23 y.o.  Referring Provider: Dr. Faye (Hem/Onc)  Pediatrician:  Margarito Arvizu M.D.  Date of Service:  2024    Discussed with patient: You have chosen to receive care through the use of secure virtual/telemedicine. This platform enables health care providers at different locations to provide safe, effective, and convenient care through the use of technology. As with any health care service, there are risks associated with the use of this platform, including equipment failure, poor image resolution or no image, and  issues. You also understand that I cannot physically examine you and that you may need to come to clinic to complete the assessment.    Patient verbally asserts understanding of the risks and benefits of telemedicine as explained. Endorses all questions answered regarding telemedicine.     I conducted this encounter from [facility] via secure, live, telephone or audio/video conference with the patient. Patient was located in [place], [place]. Prior to the interview, the risks and benefits of telemedicine were discussed with the patient and verbal consent was obtained.     *Session needed to be done by phone as this provider was unable to join through Teams due to technical difficulties. Patient has also previously had poor connection using Teams for video visit.    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 See intake note dated 23    Mental Status Exam:   General description cooperative with  interview  Interactional style Culturally appropriate  Eye contact N/A telephone  Speech Unimpaired, fluid and clear, normal rate and rythem  Motor activity N/A telephone  Orientation Oriented to person time, place and situation  Intellectual functioning Unimpaired  Memory Unimpaired  Attention and concentration Intact and normative concentration  Fund of knowledge Average  Mood Generally euthymic  Affect Appropriate  Perceptual Disturbances None apparent  Thought Process  No abnormalities apparent       Associations Unimpaired associations       Abstractions Normal abstractions, intact       Insight Insight - adequate and normative       Judgment Judgments - intact and normative   Thought Content  No apparent delusions    Risk Assessment:  Tomas Roy denied current concerns regarding risk to self or others.       Issues Discussed:   This provider met with JULY to continue assisting in rediscovering himself after the trauma of his cancer diagnosis and treatment as well as how childhood impacts this. Today JULY reported that he has interviewed at [employer], gone on a trip to [place], and will be purchasing a car soon. We processed his continued thoughts and feelings around his mother's actions and how he feels family is continuing to choose her. Reviewed how he is working to create his own life now and develop his own relationships.     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 After meeting with JULY during his intake, it appears he could benefit from learning anxiety management skills, processing what he has been through, and how to live the life he  wants. Tomas Jean-Baptiste could benefit from learning appropriate ways of expressing and coping with his emotions. He could also benefit from learning Cognitive Behavioral Therapy (CBT) and Acceptance and Commitment Therapy (ACT) tools to understand the interaction of thoughts, feelings, and actions. As well as Trauma Focused-CBT to process his cancer journey. Tomas Lees Jean-Baptiste agreed with the plan.       PLAN  JULY will learn and utilize appropriate ways to express and cope with emotions.    He will learn the connection between thoughts, feelings, and actions utilizing CBT and ACT tools.,   Processed the thoughts that arise around his mother and her actions.  JULY will process how their medical diagnosis is impacting their life.    Next visit we will spend time processing how ending treatment has felt and continued thoughts about bell ringing.     The above diagnostic impressions, recommendations, and treatment plan were discussed with and agreed upon by Tomas Kirill, and his caregivers. Care will be coordinated with Tomas Roy's healthcare team, as appropriate.    Total time spent on encounter was 45 minutes.    Laurie Ortega, PhD  Pediatric Psychologist   Licensed Psychologist, [place] # [id number]  [facility], Behavioral Health

## 2024-09-25 ENCOUNTER — PATIENT OUTREACH (OUTPATIENT)
Dept: PEDIATRIC HEMATOLOGY/ONCOLOGY | Facility: MEDICAL CENTER | Age: 24
End: 2024-09-25
Payer: MEDICAID

## 2024-09-25 NOTE — PROGRESS NOTES
Medical Social Work    SW contacted patient after receiving message from Dr. Faye that patient had questions about his Medicaid coverage at his last appointment on 9/10/24. Patient did not answer, SW left v/m.    Plan: SW assess patient's needs at next outreach if patient doesn't return call. [facility] will likely be closing PCM program, but is still available to provide support.

## 2024-09-27 ENCOUNTER — PATIENT OUTREACH (OUTPATIENT)
Dept: PEDIATRIC HEMATOLOGY/ONCOLOGY | Facility: MEDICAL CENTER | Age: 24
End: 2024-09-27
Payer: MEDICAID

## 2024-09-27 ENCOUNTER — PATIENT MESSAGE (OUTPATIENT)
Dept: HEALTH INFORMATION MANAGEMENT | Facility: OTHER | Age: 24
End: 2024-09-27

## 2024-09-27 NOTE — PROGRESS NOTES
Medical Social Work    [facility] received v/m from patient on 9/26/24 at 7:27am returning SW phone call.     SW called patient back today. Patient inquiring about vision and dental providers that accept Medicaid. SW gave patient a few options, and told patient [facility] will send a list via [employer] message for him to review.    Plan: SW will continue to follow and provide support.

## 2024-10-15 ENCOUNTER — HOSPITAL ENCOUNTER (OUTPATIENT)
Facility: MEDICAL CENTER | Age: 24
End: 2024-10-15
Attending: PEDIATRICS
Payer: MEDICAID

## 2024-10-15 ENCOUNTER — HOSPITAL ENCOUNTER (OUTPATIENT)
Dept: PEDIATRIC HEMATOLOGY/ONCOLOGY | Facility: MEDICAL CENTER | Age: 24
End: 2024-10-15
Attending: PEDIATRICS
Payer: MEDICAID

## 2024-10-15 VITALS
TEMPERATURE: 98.5 F | SYSTOLIC BLOOD PRESSURE: 118 MMHG | DIASTOLIC BLOOD PRESSURE: 77 MMHG | HEIGHT: 65 IN | BODY MASS INDEX: 28.94 KG/M2 | WEIGHT: 173.72 LBS | OXYGEN SATURATION: 98 % | HEART RATE: 85 BPM

## 2024-10-15 DIAGNOSIS — L30.8 OTHER ECZEMA: ICD-10-CM

## 2024-10-15 DIAGNOSIS — Z08 ROUTINE CANCER FOLLOW-UP VISIT: ICD-10-CM

## 2024-10-15 LAB
ALBUMIN SERPL BCP-MCNC: 4.5 G/DL (ref 3.2–4.9)
ALBUMIN/GLOB SERPL: 1.6 G/DL
ALP SERPL-CCNC: 103 U/L (ref 30–99)
ALT SERPL-CCNC: 18 U/L (ref 2–50)
ANION GAP SERPL CALC-SCNC: 13 MMOL/L (ref 7–16)
AST SERPL-CCNC: 17 U/L (ref 12–45)
BASOPHILS # BLD AUTO: 0.4 % (ref 0–1.8)
BASOPHILS # BLD: 0.02 K/UL (ref 0–0.12)
BILIRUB SERPL-MCNC: 0.3 MG/DL (ref 0.1–1.5)
BUN SERPL-MCNC: 13 MG/DL (ref 8–22)
CALCIUM ALBUM COR SERPL-MCNC: 8.9 MG/DL (ref 8.5–10.5)
CALCIUM SERPL-MCNC: 9.3 MG/DL (ref 8.5–10.5)
CHLORIDE SERPL-SCNC: 105 MMOL/L (ref 96–112)
CO2 SERPL-SCNC: 22 MMOL/L (ref 20–33)
CREAT SERPL-MCNC: 0.6 MG/DL (ref 0.5–1.4)
EOSINOPHIL # BLD AUTO: 0.11 K/UL (ref 0–0.51)
EOSINOPHIL NFR BLD: 1.9 % (ref 0–6.9)
ERYTHROCYTE [DISTWIDTH] IN BLOOD BY AUTOMATED COUNT: 40.7 FL (ref 35.9–50)
GFR SERPLBLD CREATININE-BSD FMLA CKD-EPI: 139 ML/MIN/1.73 M 2
GLOBULIN SER CALC-MCNC: 2.8 G/DL (ref 1.9–3.5)
GLUCOSE SERPL-MCNC: 93 MG/DL (ref 65–99)
HCT VFR BLD AUTO: 47.7 % (ref 42–52)
HGB BLD-MCNC: 15.9 G/DL (ref 14–18)
IMM GRANULOCYTES # BLD AUTO: 0.03 K/UL (ref 0–0.11)
IMM GRANULOCYTES NFR BLD AUTO: 0.5 % (ref 0–0.9)
LYMPHOCYTES # BLD AUTO: 1.21 K/UL (ref 1–4.8)
LYMPHOCYTES NFR BLD: 21.4 % (ref 22–41)
MCH RBC QN AUTO: 29 PG (ref 27–33)
MCHC RBC AUTO-ENTMCNC: 33.3 G/DL (ref 32.3–36.5)
MCV RBC AUTO: 87 FL (ref 81.4–97.8)
MONOCYTES # BLD AUTO: 0.64 K/UL (ref 0–0.85)
MONOCYTES NFR BLD AUTO: 11.3 % (ref 0–13.4)
NEUTROPHILS # BLD AUTO: 3.65 K/UL (ref 1.82–7.42)
NEUTROPHILS NFR BLD: 64.5 % (ref 44–72)
NRBC # BLD AUTO: 0 K/UL
NRBC BLD-RTO: 0 /100 WBC (ref 0–0.2)
PLATELET # BLD AUTO: 250 K/UL (ref 164–446)
PMV BLD AUTO: 9.5 FL (ref 9–12.9)
POTASSIUM SERPL-SCNC: 4.3 MMOL/L (ref 3.6–5.5)
PROT SERPL-MCNC: 7.3 G/DL (ref 6–8.2)
RBC # BLD AUTO: 5.48 M/UL (ref 4.7–6.1)
SODIUM SERPL-SCNC: 140 MMOL/L (ref 135–145)
WBC # BLD AUTO: 5.7 K/UL (ref 4.8–10.8)

## 2024-10-15 PROCEDURE — 85025 COMPLETE CBC W/AUTO DIFF WBC: CPT

## 2024-10-15 PROCEDURE — 99213 OFFICE O/P EST LOW 20 MIN: CPT | Performed by: PEDIATRICS

## 2024-10-15 PROCEDURE — 80053 COMPREHEN METABOLIC PANEL: CPT

## 2024-10-15 PROCEDURE — 99213 OFFICE O/P EST LOW 20 MIN: CPT

## 2024-10-15 PROCEDURE — 36415 COLL VENOUS BLD VENIPUNCTURE: CPT

## 2024-10-15 RX ORDER — BENZOCAINE/MENTHOL 6 MG-10 MG
1 LOZENGE MUCOUS MEMBRANE 2 TIMES DAILY
Qty: 28 G | Refills: 2 | Status: SHIPPED | OUTPATIENT
Start: 2024-10-15

## 2024-10-15 ASSESSMENT — FIBROSIS 4 INDEX: FIB4 SCORE: 0.47

## 2024-10-15 NOTE — PROGRESS NOTES
Pt to [facility] for lab draw and DrReid visit. Labs drawn from the left ac without difficulty / with 1 attempt.   Pt tolerated well.  Visit with Dr. Faye completed. No further orders. Will call with FU plans.

## 2024-11-07 ENCOUNTER — TELEMEDICINE (OUTPATIENT)
Dept: PSYCHOLOGY | Facility: MEDICAL CENTER | Age: 24
End: 2024-11-07
Attending: PSYCHOLOGIST
Payer: MEDICAID

## 2024-11-07 DIAGNOSIS — C91.Z0 B LYMPHOBLASTIC LEUKEMIA WITH T(9;22)(Q34;Q11.2);BCR-ABL1 (HCC): ICD-10-CM

## 2024-11-07 PROCEDURE — 96159 HLTH BHV IVNTJ INDIV EA ADDL: CPT | Performed by: PSYCHOLOGIST

## 2024-11-07 PROCEDURE — 96158 HLTH BHV IVNTJ INDIV 1ST 30: CPT | Performed by: PSYCHOLOGIST

## 2024-11-08 NOTE — PROGRESS NOTES
PEDIATRIC BEHAVIORAL HEALTH VISIT    ADULT REQUEST FOR CONFIDENTIALITY    Name:  Tomas Jean-Baptiste  MRN:  [id number]  :  2000  Age:  24 y.o.  Referring Provider: Dr. Faye (Hem/Onc)  Pediatrician:  Margarito Arvizu M.D.  Date of Service:  2024    Discussed with patient: You have chosen to receive care through the use of secure virtual/telemedicine. This platform enables health care providers at different locations to provide safe, effective, and convenient care through the use of technology. As with any health care service, there are risks associated with the use of this platform, including equipment failure, poor image resolution or no image, and  issues. You also understand that I cannot physically examine you and that you may need to come to clinic to complete the assessment.    Patient verbally asserts understanding of the risks and benefits of telemedicine as explained. Endorses all questions answered regarding telemedicine.     I conducted this encounter from [facility] via secure, live, telephone or audio/video conference with the patient. Patient was located in [place], [place]. Prior to the interview, the risks and benefits of telemedicine were discussed with the patient and verbal consent was obtained.     *Session needed to be done by phone as patient had poor connection using Teams for video visit.    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 See intake note dated 23    Mental Status Exam:   General description cooperative with interview  Interactional style Culturally appropriate  Eye contact N/A telephone  Speech  Unimpaired, fluid and clear, normal rate and rythem  Motor activity N/A telephone  Orientation Oriented to person time, place and situation  Intellectual functioning Unimpaired  Memory Unimpaired  Attention and concentration Intact and normative concentration  Fund of knowledge Average  Mood Generally euthymic  Affect Appropriate  Perceptual Disturbances None apparent  Thought Process  No abnormalities apparent       Associations Unimpaired associations       Abstractions Normal abstractions, intact       Insight Insight - adequate and normative       Judgment Judgments - intact and normative   Thought Content  No apparent delusions    Risk Assessment:  Tomasmarilyn Chavezian denied current concerns regarding risk to self or others.       Issues Discussed:   This provider met with JULY to continue assisting in rediscovering himself after the trauma of his cancer diagnosis and treatment as well as how childhood impacts this. Today JULY processed a recent interaction with his mother and reviewed the positives he is experiencing in his life. Reviewed his thoughts/feelings regarding his interaction and what it brought up in himself. Overall, JULY appears to be coping with his current family dynamics and focusing on the good in his life now.     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 After meeting with JULY during his intake, it appears he could benefit from learning anxiety management skills, processing what he has been through, and how to live the life he wants. Tomas Jean-Baptiste could benefit from learning appropriate ways of expressing and  coping with his emotions. He could also benefit from learning Cognitive Behavioral Therapy (CBT) and Acceptance and Commitment Therapy (ACT) tools to understand the interaction of thoughts, feelings, and actions. As well as Trauma Focused-CBT to process his cancer journey. Tomas Jean-Baptiste agreed with the plan.       PLAN  JULY will learn and utilize appropriate ways to express and cope with emotions.    He will learn the connection between thoughts, feelings, and actions utilizing CBT and ACT tools.,   Processed the thoughts that arise around his mother and her actions.  JULY will process how their medical diagnosis is impacting their life.    This provider will meet with JULY during his next clinic apt. He knows to reach out if additional needs/concerns arise.     The above diagnostic impressions, recommendations, and treatment plan were discussed with and agreed upon by Tomas Roy, and his caregivers. Care will be coordinated with Tomas Roy's healthcare team, as appropriate.    Total time spent on encounter was 45 minutes.    Laurie Ortega, PhD  Pediatric Psychologist   Licensed Psychologist, [place] # [id number]  [facility], Behavioral Health

## 2024-11-08 NOTE — PROGRESS NOTES
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 time demonstrated a WBC of 0.4, Hgb 8.8 and platelets 125.  ANC at the time was measured at only 170.  JULY was otherwise clinically well appearing.  He did receive a one-time normal saline bolus for his nausea and vomiting and was sent home with instructions to HOLD his oral mercaptopurine and oral methotrexate which began being held on 7/28/2023.  Per protocol, imatinib was continued at previous dose of 600 mg in the morning. Tomas Roy would return to clinic again on 8/2/2023 and 8/7/2023.  On both occasions, ANC remained under 500 and oral therapy (with the exception of imatinib) continue to be held while awaiting count recovery.  Ultimately, on 8/11/2023 after 14 days of therapy being held, Tomas Roy returned to clinic and WBC had increased to 2.1 with ANC increasing to 1500 with an absolute  monocyte count of 290. Tomas Roy was then instructed to resume his oral chemotherapy at 100% of prior dosing with (due to timing with Day 1 of Cycle 2 with LP 3 days later, no weekly MTX was administered).  Imatinib was never held.  On 8/14/2023 he was seen in clinic for Day 1 of Cycle 2 of Maintenance with lumbar puncture, vincristine, and 5-day pulse of steroids.  At the time, his ANC was 2010 and his oral chemotherapy was continued at 100% dosing.  Given his history of previously drop in counts, he was scheduled to come back for repeat labs on 8/29/2023 at which point his WBC count was 1.4 and his ANC remained greater than 500 at 1140.  Again, his therapy was continued with imatinib 550 mg by mouth in the morning as well as 100% dosing of methotrexate and 100% dosing of 6-mercaptopurine.  When Tomas Roy returned to clinic on 9/11/2023 for his Maintenance, Cycle 2, Day 29 therapy he was noted to have had another drop in his WBC to 600 and his ANC accordingly was also decreased at 410.  He did receive vincristine in accordance with protocol as well as starting a 5-day pulse of prednisone per protocol.  Given however that his ANC had dropped below 500 his oral methotrexate and oral 6-MP were again held.  He was instructed return to clinic 1 week later for lab evaluations.  On 9/19/2023 he returned to clinic and WBC had improved slightly to 0.8 however ANC remained low at 260 with an absolute monocyte count of 220.  Given that counts not recovered his oral chemotherapy remained held for an additional week.  On 9/26/2023 at 14 days after initially holding chemotherapy, he was seen back in clinic at which time WBC improved again to 1.1 however ANC did not quite recover and remained at 490 with an absolute monocyte count of 330.  Given that 2 weeks had elapsed with myelosuppression, imatinib was held in addition to oral 6-MP and methotrexate. Tomas Roy was instructed to return to clinic on 9/29/2023  for repeat counts.  On 9/29/2023 WBC had improved to 2.4 and ANC had finally recovered with 1530 and an absolute monocyte count of 510.  Given a recovery with ANC greater than 750 and platelets greater than 75, oral chemotherapy was restarted at 50% of initial dosing and imatinib was restarted at 100% of initial dosing.  On 10/9/2023, Tomas Roy returned to clinic for his Day 57 of Cycle 2 visit.  At the time of his visit, his ANC had recovered after holding chemotherapy and then restarting at 50% dosing 11 days prior.  He did however in clinic spiked a temperature 102.5 °F.  For this reason despite his ANC being improved, no dose adjustments were made in his chemotherapy.  He continued with 50% dosing with 100% adherence of his 6-MP and methotrexate as well as his imatinib at 550 mg PO QHS.   Tomas Roy was then seen in clinic again on 11/6/2023 for his Day 1 of Cycle 3 of Maintenance therapy.  At the time, his imatinib dose was adjusted back up to 600 mg daily taken in the morning.  Additionally, his methotrexate was adjusted back up to 75% of expected dosing and his mercaptopurine was increased to 75% of expected dosing as well.  He will return to clinic on 12/4/2023 for his Day 29 of Cycle 3 therapy.  At the time, his ANC was exactly 1500 and therefore no dose adjustments were made and he continued at 75% dosing for oral chemotherapy as well as the imatinib dose of 600 mg daily.  On 1/2/2024 he was seen for his Day 57 evaluations and his ANC had dropped to 1450 again not prompting any change in oral chemotherapy dosing.  Tomas Roy completed his Cycle 4 chemotherapy without any adverse events or complications.  He did not have any changes in medications either.  Most recently,  Tomas Roy was seen in clinic on 4/22/2023 for his Day 1 of Cycle 5 chemotherapy.  At the time, a lumbar puncture was performed and IT chemotherapy was provided.  He tolerated the procedure and the therapy well  without any sequelae.  His ANC at the time was > 1500 however the dose adjustment was made as this was the first ANC greater than 1500 and Tomas Roy had a history of precipitous drops in his counts with increases in his oral chemotherapy.  On 5/20/2024, Tomas Roy presented to clinic for his Cycle 5, Day 29 therapy and evaluations. At that time, he was started on 5 day pulse of prednisone and his oral methotrexate dose was increased to 13 tablets PO weekly (90.78% of expected dosing). His port was removed on 5/20/2024 by Dr. Moise. He completed therapy on 5/30/2024. Tomas Roy was most recently seen in clinic on 9/10/2024 for his 3-month month off therapy visit.   Today he presents for his 4.5 months of therapy visit.  No significant interval history reported.    On presentation, Tomas Roy  reports that he is quite well.  He has no report of any recent or remote illness.  Energy and activity are at baseline.  No complaints of any nausea, vomiting, diarrhea or constipation.  Not complaining of any headaches or neurologic status changes.  He does however report persistent neuropathies in his feet.  These are now quite tolerable for him and he has decided not to treat them.  He also continues to utilize hydrocortisone cream for his feet.  He reports that the peeling and the pain have improved.  Now however he has concern that his indoor slippers will occasionally swell quite bad.  He is wondering if this has anything to do with his therapy or neuropathies.  Tomas Roy denies any other skin changes or rashes.  No easy bruising or bleeding.  Not currently taking any medications, no other concerns or complaints at this time.      Review of Systems:     Constitutional: Afebrile.  Without recent illness.  Energy and activity are good.   HENT: Negative for ear pain, nasal congestion or rhinorrhea, nosebleeds and sore throat.  No mouth sores.  Eyes: Negative for visual changes.  Respiratory:  Negative for shortness of breath or noisy breathing.   Cardiovascular: Negative for chest pain or extremity swelling.    Gastrointestinal: Negative for nausea, vomiting, abdominal pain, diarrhea, constipation or blood in stool.    Genitourinary: Negative for painful urination, blood in urine or flank pain.    Musculoskeletal: Negative for joint or muscle pains.    Skin: Negative for rash, signs of infection.  Neurological: Negative for numbness, tingling, sensory changes, weakness or headaches.    Endo/Heme/Allergies: Does not bruise/bleed easily.    Psychiatric/Behavioral: No changes in mood, appropriate for age.     PAST MEDICAL HISTORY:     Oncologic History:  2-3 week history of worsening fatigue, right lower extremity pain  Presentation to OS and diagnosed with right LE superficial thrombus, subsegmental PE and hyperleukocytosis, anemia and thrombocytopenia  Transferred to [facility] for definitive care  Presenting (local) WBC > 440K, Hgb 10.0, platelets 53, (automated differential ANC 3190, ALC 75,310, absolute monocyte count 47008, absolute blast count 340,560)  Uric Acid 15.6, phosphorus 5.6, LDH 1114  Rasburicase x 1 dose given   Peripheral Blood flow cytometry demonstrating CD10 pos, CD19 pos, CD20 neg, CD22 dim (60%) 5/28/2022  Peripheral blood FISH for BCR-ABL1 positive in 98% of analyzed cells     Age at Diagnosis: 21 years  White Blood Cell Count at Presentation: > 440 k/uL  Testicular Disease Status: Negative (see procedure note 5/30/2022)  CNS Status: 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, diagnostic LP with WBC 1, RBC 3 and no evidence of leukemic blasts 5/30/2022  Steroid Pre-treatment: None  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     All inclusion/exclusion criteria for [id number] met and consent signed - enrolled 5/29/2022   All inclusion/exclusion criteria for [id number] met and consent signed - enrolled  5/30/2022  Confirmatory bone marrow aspirate and biopsy and diagnostic LP + cytarabine 70 mg IT 5/30/2022  Induction therapy (ON STUDY [id number]) started 5/30/2022  Bone marrow immunohistochemistry consistent with diagnosis of B-ALL comprising 90% of marrow cellularity  Bone marrow sample sent to [facility] purposes:  Flow cytom  Of the blood pressure little high that is a problem is a cultural problem is well and cultural genetic and everything else like that unfortunately breathalyzers such bad heart disease diabetes things like that  populations etry consistent with peripheral blood, cytogenetics remarkable for known t(9;22)  CSF with WBC 1, RBC 3, no blasts identified on cytospin  FISH results available 5/31/2022 making patient eligible for transfer from [place] [address] to [place] [address] as eligibility requirements were met for [place] [address]  Patient unenrolled from [place] [address] (BCR-ABL1 fusion positive) 6/1/2022  Consent signed for [place] [address] and patient enrolled 6/1/2022 (see eligibility checklist from 5/31/2022 and consent note from 6/1/2022)  Imatinib 400 mg PO QAM / 200 mg PO QPM started 6/3/2022 (allowed per [place] [address])  Patient completed the first 15 days of a Standard 4-drug Induction on 6/13/2022  Start of [facility]1631(OS), Induction IA Part 2, Day 15 6/13/2022  End of Induction 1A Part 2 - MRD negative  Start of [place] [address](OS), Induction IA Part 2, Day 15 7/5/2022  Induction IB DELAYED 2 weeks 14 days from 7/26/2022-8/9/2022) for myelosuppression - Start of Day 22 cytarabine block 8/9/2022  Induction IB Day 42 delayed 9 days for myelosuppression - Day 42 evaluations 9/7/2022  End of Induction IB - Flow cytometric MRD negative, MRD by IgH-TCR PCR [id number]%  Randomization to AR-Experimental Arm B of [place] [address]  Start of AR-Experimental Arm B, Interim Maintenance 9/29/2022  Weight based increase in dose of imatinib to 400 mg PO AM and 250 mg PM 9/29/2022  Thrombocytopenia not permissive of proceeding with Day 15  "of Interim Maintenance  AR-Experimental Arm B, Interim Maintenance, Day 15 delayed 4 days, start 10/17/2022  AR-Experimental Arm B, Interim Maintenance, Day 29, start 11/1/2022  AR-Experimental Arm B, Interim Maintenance, Day 43, start 11/14/2022  Last does of 6-MP 11/27/2022  AR-Experimental Arm B, Delayed Intensification, Day 1, start 12/6/2022  Admission with Severe Septic Shock 12/27/2022  Imatinib HELD 12/27/2022-1/8/2023  AR-Experimental Arm B, Delayed Intensification, Day 29 DELAYED 14 days with start 1/17/2023  Hospitalization 2/7/2023 on Day 50 of Delayed Intensification with left shoulder pain ultimately diagnosed with Bacteroides fragilis infection  AR-Experimental Arm B, Interim Maintenance, Day 1 DELAYED 7 days with start 2/27/2023  AR-Experimental Arm B, Interim Maintenance, Day 11 DELAYED 4 days for platelets 43K on 3/9/2023  AR-Experimental Arm B, Interim Maintenance, Day 11 VCR given and MTX omitted for platelets 43K 3/13/2023  Imatinib HELD 3/30/2023-4/11/2023  AR-Experimental Arm B, Interim Maintenance, Day 21 (DELAYED 22 DAYS) - administered 4/11/2023 with methotrexate 80 mg/m² IV  AR-Experimental Arm B, Interim Maintenance, Day 31 (\"true\" Day 31 4/25/2023) - VCR and IT MTX given 4/28/2023 - IV MTX omitted  AR-Experimental Arm B, Interim Maintenance, Day 41 met with counts - administered 5/5/2023 with methotrexate 80 mg/m² IV     Start of Maintenance, Cycle 1, Day 1 -5/22/2023  Cranial radiation 6/5/2023-6/16/2023 (10 fractions)  Maintenance, Cycle 1, Day 29 - 6/23/2023 (no IT MTX given)  Maintenance, Cycle 1, Day 67, presented with fatigue nausea and vomiting found to have ANC less than 500  Methotrexate (oral) and mercaptopurine held 7/27/2023 - continued with imatinib  Methotrexate (oral) and mercaptopurine held 8/2/2023 - continued with imatinib  Methotrexate (oral) and mercaptopurine held 8/7/2023 - continued with imatinib  Restarted methotrexate (oral) and mercaptopurine at 100% previous " dosing on 8/11/2023 - continued with imatinib  Maintenance, Cycle 2, Day 1 - 8/14/2023  Maintenance, Cycle 2, Day 29 - 9/11/2023  Methotrexate (oral) and mercaptopurine held 9/11/2023 - continued with imatinib  Methotrexate (oral) and mercaptopurine held 9/19/2023 - continued with imatinib  Methotrexate (oral) and mercaptopurine held 9/26/2023 - HELD imatinib  Methotrexate (oral) restarted at 50% dosing and mercaptopurine restarted at 50% dosing 9/29/2023 - RESTARTED imatinib  Maintenance, Cycle 2, Day 57 - 10/9/2023  Maintenance, Cycle 3, Day 1 - 11/6/2023: Methotrexate (oral) increased to 75% dosing and mercaptopurine increased to 75% dosing.  Imatinib increased to 600 mg QAM 11/6/2023  Maintenance Cycle 3, Day 29: 12/4/2023 No changes made to the dosing of oral chemotherapy  Maintenance, Cycle 4, Day 1: No dose adjustments made however ANC slightly greater than >1500.  Oral chemotherapy did not need weight adjustment.  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  3) Hyperleukocytosis  4) Hyperuricemia  5) Hyperphosphatemia  6) Right Lower Extremity Superficial Thrombus  7) Subsegmental Pulmonary Embolism  8) 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  9) Bacteroides fragilis soft tissue infection left shoulder  10) Anxiety/Depression     Past Surgical History:     1) Temporary Right IJ Pharesis Catheter Placement 5/28/2022  2) Right-sided Port-A-Cath placement 8/29/2022  3) IR drainage left calf hematoma  4) Left shoulder I&D  5) Cranial XRT 6/5/2023-6/16/2023     Birth/Developmental History:   1st of three children  Unremarkable pregnancy  Unremarkable delivery     Family History:  No significant family history of cancer.  Both maternal and paternal family history of diabetes.     Immunizations:  UTD     Social History:    "Lives with girlfriend.  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  Doing well with school, studying currently for upcoming professional engineering exam.    Medications:   No current outpatient medications on file prior to encounter.     No current facility-administered medications on file prior to encounter.     OBJECTIVE:     Vitals:   /77 (BP Location: Right arm, Patient Position: Sitting, BP Cuff Size: Adult)   Pulse 85   Temp 36.9 °C (98.5 °F) (Temporal)   Ht 1.653 m (5' 5.06\")   Wt 78.8 kg (173 lb 11.6 oz)   SpO2 98%   BMI 28.86 kg/m²     Labs:     Hospital Outpatient Visit on 10/15/2024   Component Date Value    Sodium 10/15/2024 140     Potassium 10/15/2024 4.3     Chloride 10/15/2024 105     Co2 10/15/2024 22     Anion Gap 10/15/2024 13.0     Glucose 10/15/2024 93     Bun 10/15/2024 13     Creatinine 10/15/2024 0.60     Calcium 10/15/2024 9.3     Correct Calcium 10/15/2024 8.9     AST(SGOT) 10/15/2024 17     ALT(SGPT) 10/15/2024 18     Alkaline Phosphatase 10/15/2024 103 (H)     Total Bilirubin 10/15/2024 0.3     Albumin 10/15/2024 4.5     Total Protein 10/15/2024 7.3     Globulin 10/15/2024 2.8     A-G Ratio 10/15/2024 1.6     WBC 10/15/2024 5.7     RBC 10/15/2024 5.48     Hemoglobin 10/15/2024 15.9     Hematocrit 10/15/2024 47.7     MCV 10/15/2024 87.0     MCH 10/15/2024 29.0     MCHC 10/15/2024 33.3     RDW 10/15/2024 40.7     Platelet Count 10/15/2024 250     MPV 10/15/2024 9.5     Neutrophils-Polys 10/15/2024 64.50     Lymphocytes 10/15/2024 21.40 (L)     Monocytes 10/15/2024 11.30     Eosinophils 10/15/2024 1.90     Basophils 10/15/2024 0.40     Immature Granulocytes 10/15/2024 0.50     Nucleated RBC 10/15/2024 0.00     Neutrophils (Absolute) 10/15/2024 3.65     Lymphs (Absolute) 10/15/2024 1.21     Monos (Absolute) 10/15/2024 0.64     Eos (Absolute) 10/15/2024 0.11     Baso (Absolute) 10/15/2024 0.02     Immature Granulocytes (a* 10/15/2024 0.03     NRBC (Absolute) 10/15/2024 0.00     GFR (CKD-EPI) " 10/15/2024 139       Physical Exam:    Constitutional: Well-developed, well-nourished, and in no distress.  Well appearing.  HENT: Normocephalic and atraumatic. No nasal congestion or rhinorrhea. Oropharynx is clear and moist. No oral ulcerations or sores.    Eyes: Conjunctivae are normal. Pupils are equal, round, and reactive to light.    Neck: Normal range of motion of neck, no adenopathy.    Cardiovascular: Normal rate, regular rhythm and normal heart sounds.  No murmur heard. DP/radial pulses 2+, cap refill < 2 sec  Pulmonary/Chest: Effort normal and breath sounds normal. No respiratory distress. Symmetric expansion.  No crackles or wheezes.  Abdomen: Soft. Bowel sounds are normal. No distension and no mass. There is no hepatosplenomegaly.    Genitourinary:  Deferred  Musculoskeletal: Normal range of motion of lower and upper extremities bilaterally. No tenderness to palpation of elbows, wrists, hands, knees, ankles and feet bilaterally.   Lymphadenopathy: No cervical adenopathy, axillary adenopathy or inguinal adenopathy.   Neurological: Alert and oriented to person and place. Exhibits normal muscle tone bilaterally in upper and lower extremities. Gait normal. Coordination normal.    Skin: Skin is warm, dry and pink.  No rash or evidence of skin infection.  No pallor.   Psychiatric: Mood and affect normal for age.    ASSESSMENT AND PLAN:     Tomas Jean-Baptiste is a previously healthy 24 year old male with Precursor B-Cell Lymphoblastic Leukemia with BCR-ABL1 fusion and whose End of Induction IB MRD was both negative by flow cytometry and PCR who presents for scheduled 4.5  month off therapy visit     1) Ph+ Precursor B-Cell Acute Lymphoblastic Leukemia, in MRD Remission:               - 2-3 weeks of symptoms  - Presenting WBC > 440 k/uL, hyperleukocytosis  - Start of Hydroxyurea (1500 mg PO Q8) 2320 on 5/27/2022  - discontinued after only 55 hours  - No steroid pretreatment  - CNS3c due to cranial  nerve 6 palsy  - Testicular status NEGATIVE       - Flow cytometry of both 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, FISH positive for BCR-ABL1 translocation  - Enrolled on [facility] [id number]  - Initially enrolled on [facility] [id number] - but 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                            - Enrolled on [facility] [id number] and began study 6/13/2022              - Started imatinib therapy 6/3/2022   - End of Induction IA and IB MRD negative  - On Imatinib and completed therapy  - Received whole brain radiation therapy from 6/5/2024 until 6/16/2024: 1800 cGy      [id number](OS), AR Arm B, 4.5 months off-therapy   - Complete therapy 5/30/2024  - End of therapy bone study and ECHO obtained on 6/5/2024     2) Myelosuppression Secondary To Chemotherapy (RESOLVED):            - WBC 5700/microliters, Hgb 15.9 gm/dL, platelets 250,000/microliters           - ANC 3650/microliters, ALC 1210/microliters     3) At Risk of Opportunistic Lung Infection:  -Okay to discontinue Bactrim at this time     4) 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  No issues with learning.  Some issues with memory currently.              - Psychosocial:  Dealing with family issues, but seem to be coping ok.  Father recently back in [facility] life.  Living with girlfriend overall doing well.              - Renal:  /77 mm Hg.  Creatinine 0.60 mg/dL, Electrolytes: WNL. Will continue to monitor              - Cardiac: Daunorubicin 100 mg/m2 and Doxorubicin 75 mg/m2. Hence, cumulative  Doxorubicin equivalent isotoxic dose is 158.3 mg/m2.  No radiation.  Given low-dose  anthracycline without radiation to chest, recommendation for ECHO every 5 years.  Next ECHO in 2029              - Pulmonary:  No chest/mediastinal radiation, no exposure to chemotherapies with pulmonary toxicity.              - Musckuloskeletal: Bone age obtained on 6/5/2024: No concerns. Vitamin D 20 on 8/8/2024.  Encourage supplementation.              - Growth and Development: Weight improving. No concerns.              - Reproductive: Has a girlfriend. Knows to practice safe sex. Consider testing AM testosterone, LH, FSH, and inhibin level. FSH and Inhibin level can be used to assess ability to produce sperm.              - Candace: 90     8) Health Maintenance:             - Encouraged to FU with dentists and optometrist/ophthalmologist 6 months to yearly             - Encouraged to get FLU/Covid vaccine (have to wait atleast 1 year post completion of therapy to receive any other vaccinations)       Disposition: RTC in 2-3 months     Pepe Faye MD  Pediatric Hematology / Oncology  [facility]  Cell.  [phone]  Office. [phone]

## 2024-11-21 NOTE — PROGRESS NOTES
Airway    Date/Time: 11/21/2024 7:32 AM    Performed by: Wes Ramírez M.D.  Authorized by: Wes Ramírez M.D.    Location:  OR  Urgency:  Elective  Difficult Airway: No    Indications for Airway Management:  Anesthesia      Spontaneous Ventilation: absent    Sedation Level:  Deep  Preoxygenated: Yes    Patient Position:  Sniffing  Final Airway Type:  Endotracheal airway  Final Endotracheal Airway:  ETT  Cuffed: Yes    Technique Used for Successful ETT Placement:  Direct laryngoscopy  Devices/Methods Used in Placement:  Anterior pressure/BURP    Insertion Site:  Oral  Blade Type:  Kyle  Laryngoscope Blade/Videolaryngoscope Blade Size:  3  ETT Size (mm):  8.0  Measured from:  Teeth  ETT to Teeth (cm):  23  Placement Verified by: auscultation and capnometry    Cormack-Lehane Classification:  Grade I - full view of glottis  Number of Attempts at Approach:  1         [facility] to [facility] for lab draw, LP, doctors office visit, and chemotherapy administration.      Afebrile.  VSS.  Awake and alert in no acute distress.      Port accessed on 1/17/23 with brisk blood return.  Pt tolerated well.      Verified patency prior to procedure. Versed administered per MAR. Procedure performed by Dr. Faye.      Chemotherapy dosage calculated independently by Tammie Covington RN and Huong Foster Rn and compared to road map for protocol [id number].  Calculations within 10% of written order.  Lab results reviewed.      Premedications and chemo given as ordered, see MAR.  Blood return verified prior to, during, and after chemotherapy infusion.  See Chemotherapy flowsheet.  PT tolerated well.  No side effects or complications noted. Pt wanting to be de-accessed and then re-accessed tomorrow for chemotherpay. Port flushed per orders (see MAR) and de-accessed after completion. PT home with mother.  Will return for next visit on 1/19/23.

## 2025-01-15 ENCOUNTER — HOSPITAL ENCOUNTER (OUTPATIENT)
Facility: MEDICAL CENTER | Age: 25
End: 2025-01-15
Attending: PEDIATRICS
Payer: COMMERCIAL

## 2025-01-15 ENCOUNTER — OFFICE VISIT (OUTPATIENT)
Dept: OPHTHALMOLOGY | Facility: MEDICAL CENTER | Age: 25
End: 2025-01-15
Payer: COMMERCIAL

## 2025-01-15 ENCOUNTER — HOSPITAL ENCOUNTER (OUTPATIENT)
Dept: PEDIATRIC HEMATOLOGY/ONCOLOGY | Facility: MEDICAL CENTER | Age: 25
End: 2025-01-15
Attending: PEDIATRICS
Payer: COMMERCIAL

## 2025-01-15 VITALS
SYSTOLIC BLOOD PRESSURE: 127 MMHG | HEART RATE: 88 BPM | OXYGEN SATURATION: 98 % | TEMPERATURE: 98.2 F | HEIGHT: 65 IN | BODY MASS INDEX: 29.75 KG/M2 | DIASTOLIC BLOOD PRESSURE: 72 MMHG | WEIGHT: 178.57 LBS

## 2025-01-15 DIAGNOSIS — Z08 ROUTINE CANCER FOLLOW-UP VISIT: ICD-10-CM

## 2025-01-15 DIAGNOSIS — H49.22 LEFT ABDUCENS NERVE PALSY: ICD-10-CM

## 2025-01-15 DIAGNOSIS — C91.00 PHILADELPHIA CHROMOSOME POSITIVE ACUTE LYMPHOBLASTIC LEUKEMIA (HCC): ICD-10-CM

## 2025-01-15 DIAGNOSIS — H40.003 GLAUCOMA SUSPECT OF BOTH EYES: ICD-10-CM

## 2025-01-15 DIAGNOSIS — H52.13 MYOPIA OF BOTH EYES: ICD-10-CM

## 2025-01-15 LAB
25(OH)D3 SERPL-MCNC: 14 NG/ML (ref 30–100)
ALBUMIN SERPL BCP-MCNC: 4.7 G/DL (ref 3.2–4.9)
ALBUMIN/GLOB SERPL: 1.6 G/DL
ALP SERPL-CCNC: 115 U/L (ref 30–99)
ALT SERPL-CCNC: 26 U/L (ref 2–50)
ANION GAP SERPL CALC-SCNC: 12 MMOL/L (ref 7–16)
ANISOCYTOSIS BLD QL SMEAR: NORMAL
AST SERPL-CCNC: 28 U/L (ref 12–45)
BASOPHILS # BLD AUTO: 1.7 % (ref 0–1.8)
BASOPHILS # BLD: 0.09 K/UL (ref 0–0.12)
BILIRUB SERPL-MCNC: 0.5 MG/DL (ref 0.1–1.5)
BUN SERPL-MCNC: 10 MG/DL (ref 8–22)
CALCIUM ALBUM COR SERPL-MCNC: 8.9 MG/DL (ref 8.5–10.5)
CALCIUM SERPL-MCNC: 9.5 MG/DL (ref 8.5–10.5)
CHLORIDE SERPL-SCNC: 104 MMOL/L (ref 96–112)
CO2 SERPL-SCNC: 23 MMOL/L (ref 20–33)
CREAT SERPL-MCNC: 0.81 MG/DL (ref 0.5–1.4)
EOSINOPHIL # BLD AUTO: 0.05 K/UL (ref 0–0.51)
EOSINOPHIL NFR BLD: 0.9 % (ref 0–6.9)
ERYTHROCYTE [DISTWIDTH] IN BLOOD BY AUTOMATED COUNT: 40.9 FL (ref 35.9–50)
GFR SERPLBLD CREATININE-BSD FMLA CKD-EPI: 127 ML/MIN/1.73 M 2
GLOBULIN SER CALC-MCNC: 2.9 G/DL (ref 1.9–3.5)
GLUCOSE SERPL-MCNC: 89 MG/DL (ref 65–99)
HCT VFR BLD AUTO: 48 % (ref 42–52)
HGB BLD-MCNC: 16.6 G/DL (ref 14–18)
LYMPHOCYTES # BLD AUTO: 1.78 K/UL (ref 1–4.8)
LYMPHOCYTES NFR BLD: 33 % (ref 22–41)
MAGNESIUM SERPL-MCNC: 2 MG/DL (ref 1.5–2.5)
MANUAL DIFF BLD: NORMAL
MCH RBC QN AUTO: 29.9 PG (ref 27–33)
MCHC RBC AUTO-ENTMCNC: 34.6 G/DL (ref 32.3–36.5)
MCV RBC AUTO: 86.5 FL (ref 81.4–97.8)
MICROCYTES BLD QL SMEAR: NORMAL
MONOCYTES # BLD AUTO: 0.24 K/UL (ref 0–0.85)
MONOCYTES NFR BLD AUTO: 4.4 % (ref 0–13.4)
MORPHOLOGY BLD-IMP: NORMAL
NEUTROPHILS # BLD AUTO: 3.24 K/UL (ref 1.82–7.42)
NEUTROPHILS NFR BLD: 59.1 % (ref 44–72)
NEUTS BAND NFR BLD MANUAL: 0.9 % (ref 0–10)
NRBC # BLD AUTO: 0 K/UL
NRBC BLD-RTO: 0 /100 WBC (ref 0–0.2)
PHOSPHATE SERPL-MCNC: 3.2 MG/DL (ref 2.5–4.5)
PLATELET # BLD AUTO: 277 K/UL (ref 164–446)
PLATELET BLD QL SMEAR: NORMAL
PMV BLD AUTO: 9.1 FL (ref 9–12.9)
POTASSIUM SERPL-SCNC: 4.1 MMOL/L (ref 3.6–5.5)
PROT SERPL-MCNC: 7.6 G/DL (ref 6–8.2)
RBC # BLD AUTO: 5.55 M/UL (ref 4.7–6.1)
RBC BLD AUTO: PRESENT
SODIUM SERPL-SCNC: 139 MMOL/L (ref 135–145)
WBC # BLD AUTO: 5.4 K/UL (ref 4.8–10.8)

## 2025-01-15 PROCEDURE — 92250 FUNDUS PHOTOGRAPHY W/I&R: CPT | Performed by: OPHTHALMOLOGY

## 2025-01-15 PROCEDURE — 80053 COMPREHEN METABOLIC PANEL: CPT

## 2025-01-15 PROCEDURE — 99213 OFFICE O/P EST LOW 20 MIN: CPT | Performed by: PEDIATRICS

## 2025-01-15 PROCEDURE — 85007 BL SMEAR W/DIFF WBC COUNT: CPT

## 2025-01-15 PROCEDURE — 83735 ASSAY OF MAGNESIUM: CPT

## 2025-01-15 PROCEDURE — 84100 ASSAY OF PHOSPHORUS: CPT

## 2025-01-15 PROCEDURE — 82306 VITAMIN D 25 HYDROXY: CPT

## 2025-01-15 PROCEDURE — 36415 COLL VENOUS BLD VENIPUNCTURE: CPT

## 2025-01-15 PROCEDURE — 92015 DETERMINE REFRACTIVE STATE: CPT | Performed by: OPHTHALMOLOGY

## 2025-01-15 PROCEDURE — 99213 OFFICE O/P EST LOW 20 MIN: CPT | Mod: 25 | Performed by: OPHTHALMOLOGY

## 2025-01-15 PROCEDURE — 85027 COMPLETE CBC AUTOMATED: CPT

## 2025-01-15 PROCEDURE — 99213 OFFICE O/P EST LOW 20 MIN: CPT

## 2025-01-15 PROCEDURE — 92060 SENSORIMOTOR EXAMINATION: CPT | Performed by: OPHTHALMOLOGY

## 2025-01-15 ASSESSMENT — VISUAL ACUITY
OS_CC: J1+
OD_CC: J1+
OD_CC: 20/20
OS_CC: 20/25+2
CORRECTION_TYPE: GLASSES
METHOD: SNELLEN - LINEAR

## 2025-01-15 ASSESSMENT — TONOMETRY
IOP_METHOD: APPLANATION
OD_IOP_MMHG: 15
OS_IOP_MMHG: 16
OD_IOP_MMHG: 16
OS_IOP_MMHG: 18

## 2025-01-15 ASSESSMENT — CUP TO DISC RATIO
OS_RATIO: 0.3
OD_RATIO: 0.3

## 2025-01-15 ASSESSMENT — REFRACTION_MANIFEST
OD_AXIS: 097
OD_SPHERE: -7.50
OD_CYLINDER: +2.75
METHOD_AUTOREFRACTION: 1
OS_SPHERE: -8.25
OS_CYLINDER: +2.75
OS_AXIS: 084

## 2025-01-15 ASSESSMENT — REFRACTION_WEARINGRX
OD_HBASE: OUT
OS_AXIS: 090
OD_CYLINDER: +2.75
OS_SPHERE: -7.50
SPECS_TYPE: SVL
OD_HPRISM: 5
OD_AXIS: 095
OS_CYLINDER: +2.50
OS_HPRISM: 5
OS_HBASE: OUT
OD_SPHERE: -7.50

## 2025-01-15 ASSESSMENT — EXTERNAL EXAM - RIGHT EYE: OD_EXAM: NORMAL

## 2025-01-15 ASSESSMENT — REFRACTION_FINALRX
OD_HPRISM: 5
OS_HPRISM: 5
OS_HBASE: OUT
OD_HBASE: OUT

## 2025-01-15 ASSESSMENT — EXTERNAL EXAM - LEFT EYE: OS_EXAM: NORMAL

## 2025-01-15 ASSESSMENT — FIBROSIS 4 INDEX: FIB4 SCORE: 0.37

## 2025-01-15 ASSESSMENT — SLIT LAMP EXAM - LIDS
COMMENTS: NORMAL
COMMENTS: NORMAL

## 2025-01-15 NOTE — PROGRESS NOTES
Pt to [facility] for lab draw and DrReid visit. Labs drawn from the left ac without difficulty / with 1 attempt.   Pt tolerated well.  Visit with Dr. Faye completed. No further orders.

## 2025-01-15 NOTE — PROGRESS NOTES
Peds/Neuro Ophthalmology:   Ephraim Carranza M.D.    Date & Time note created:    1/15/2025   11:31 AM     Referring MD / APRN:  Margarito Arvizu M.D., No att. providers found    Patient ID:  Name:             Tomas Jean-Baptiste   YOB: 2000  Age:                 24 y.o.  male   MRN:               [id number]    Chief Complaint/Reason for Visit:     Other (Double vision)      History of Present Illness:    JULY Jean-Baptiste is a 24 y.o. male   1 year follow up on prism glasses.No double vision with glasses.Some issue with lighting bothering eyes.    Other        Review of Systems:  Review of Systems   Eyes:         Light sensitive with computers and fluorescent lights.   All other systems reviewed and are negative.      Past Medical History:   Past Medical History:   Diagnosis Date    Blood clotting disorder (HCC) 2022    right calf - blood thinners    Cancer (HCC)     Leukoma     Neuropathy     Psychiatric problem     anxiety       Past Surgical History:  Past Surgical History:   Procedure Laterality Date    PB REMOVAL TUNNELED CV CATH N/A 5/20/2024    Procedure: PORT REMOVAL;  Surgeon: Simona Moise M.D.;  Location: SURGERY [facility];  Service: General    INCISION AND DRAINAGE GENERAL Left 2/17/2023    Procedure: INCISION AND DRAINAGE OF LEFT SHOULDER;  Surgeon: Luke Haddad M.D.;  Location: SURGERY [facility];  Service: General    CATH PLACEMENT Right 8/29/2022    Procedure: INSERTION, CATHETER;  Surgeon: Simona Moise M.D.;  Location: SURGERY [facility];  Service: Ent       Current Outpatient Medications:  Current Outpatient Medications   Medication Sig Dispense Refill    hydrocortisone 1 % Cream Apply 1 Application topically 2 times a day. 28 g 2     No current facility-administered medications for this visit.       Allergies:  Allergies   Allergen Reactions    Amoxicillin Rash     Reacted as an infant. No swelling or airway problems.  Tolerated cefepime June  2022       Family History:  Family History   Problem Relation Age of Onset    No Known Problems Mother     Stroke Maternal Grandmother     Diabetes Paternal Grandfather     Hypertension Paternal Grandfather     Hyperlipidemia Paternal Grandfather     Cancer Neg Hx     Heart Disease Neg Hx        Social History:  Social History     Socioeconomic History    Marital status: Single     Spouse name: Not on file    Number of children: Not on file    Years of education: Not on file    Highest education level: Not on file   Occupational History    Not on file   Tobacco Use    Smoking status: Never    Smokeless tobacco: Never   Vaping Use    Vaping status: Never Used   Substance and Sexual Activity    Alcohol use: Never    Drug use: Never    Sexual activity: Not Currently   Other Topics Concern    Behavioral problems Not Asked    Interpersonal relationships Not Asked    Sad or not enjoying activities Not Asked    Suicidal thoughts Not Asked    Poor school performance Not Asked    Reading difficulties Not Asked    Speech difficulties Not Asked    Writing difficulties Not Asked    Inadequate sleep Not Asked    Excessive TV viewing Not Asked    Excessive video game use Not Asked    Inadequate exercise Not Asked    Sports related Not Asked    Poor diet Not Asked    Family concerns for drug/alcohol abuse Not Asked    Poor oral hygiene Not Asked    Bike safety Not Asked    Family concerns vehicle safety Not Asked   Social History Narrative    [place]      Social Drivers of Health     Financial Resource Strain: Not on file   Food Insecurity: Not on file   Transportation Needs: Not on file   Physical Activity: Not on file   Stress: Not on file   Social Connections: Not on file   Intimate Partner Violence: Not on file   Housing Stability: Not on file          Physical Exam:  Physical Exam    Oriented x 3  Weight/BMI: There is no height or weight on file to calculate BMI.  There were no vitals taken for this visit.    Base  Eye Exam       Visual Acuity (Snellen - Linear)         Right Left    Dist cc 20/20 20/25+2    Near cc J1+ J1+      Correction: Glasses              Tonometry (i care, 7:53 AM)         Right Left    Pressure 15 18              Tonometry #2 (Applanation)         Right Left    Pressure 16 16              Pupils         Pupils    Right PERRL    Left PERRL              Extraocular Movement         Right Left     Full Full              Neuro/Psych       Oriented x3: Yes    Mood/Affect: Normal                  Additional Tests       Color         Right Left    Ishihara 9/9 9/9              Stereo       Fly: +    Animals: 3/3    Circles: 2/9                  Strabismus Exam       Correction: cc      Distance Near Near +3DS N Bifocals                      0 0 0   0 0 0                      ET 12 0  0  ET 10 0  0  ET 10                     0 0 0   0 0 0                       Slit Lamp and Fundus Exam       External Exam         Right Left    External Normal Normal              Slit Lamp Exam         Right Left    Lids/Lashes Normal Normal    Conjunctiva/Sclera White and quiet White and quiet    Cornea Clear Clear    Anterior Chamber Deep and quiet Deep and quiet    Iris Round and reactive Round and reactive    Lens Clear Clear    Vitreous Normal Normal              Fundus Exam         Right Left    Disc Tilted disc Tilted disc    C/D Ratio 0.3 0.3    Macula Normal Normal    Vessels Normal Normal    Periphery Normal Normal                  Refraction       Wearing Rx         Sphere Cylinder Axis Horz Prism    Right -7.50 +2.75 095 5 out    Left -7.50 +2.50 090 5 out      Type: SVL              Manifest Refraction (Auto)         Sphere Cylinder Axis    Right -7.50 +2.75 097    Left -8.25 +2.75 084              Final Rx         Sphere Cylinder Axis Horz Prism    Right -7.75 +2.75 095 5 out    Left -7.75 +2.50 090 5 out      Type: SVL                    Pertinent Lab/Test/Imaging Review:      Assessment and Plan:     Glaucoma  suspect of both eyes  11/21/2023-IOP stable.  Slight increased cup-to-disc.  Optic nerve head appearance is stable.  Does have slight change in OCT neurofibrillary thickness that will monitor.  OCT neurofibrillary thickness OD 85 OS 87 today  1/15/2025-IOP stable.  Slight increased cup-to-disc with tilting.  OCT neurofibrillary thickness stable at 84 OD 87 OS    Left abducens nerve palsy  6/3/2022 - 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 Blurry vision secondary to asthenopia from overlapping images and acuity improves under monocular conditions. 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 Less likely infectious process given no papilledema, headaches or other meningeal signs. Could also be some form of ischemic process if BP fluctuations during induction. Recommend MRI orbits with citlali and repeat LP. Recommend that he could wear eye patch.   7/25/2022 - Has almost full abduction of the left eye, but has a relatively comitant esotropia. Uncertain if this is as the nerve heals, or being left with a comitant strabismus. In PAT did well wih a 12 base out prism OS. Will re-eval in 4 to 6 weeks and if stable will grind in or if improved will taper the prism  8/31/2022 - Doing will with the prism glasses. Showing some slight improvement. Measuring 10 diopters today. Thus will continue to monitor   11/9/2022 - stable at around 10 prisms. Placed new press on prism. However has vision benefit and considering new rx, so will give with 5 base out to grind in.  Discussed waiting 8 months to a year prior to strabismus surgical repair  11/21/2023 - Has rx prism 5 base out ground in OU.  Doing well orthotropic in primary position.  Extraocular muscles essentially full suggesting following improvement of 6th nerve palsy is left with a competent esotropia.  Since it has not progressed and happy with prisms we will continue to monitor  1/15/2025-overall stable.  Doing well with 5 base out prism OU.  Ground in.  Would continue.   Slight adjustment in distance myopic correction    Myopia of both eyes  6/3/17494 0- high myopia. Has old glasses and under monocular testing vision improves near baseline. No apparent retinal abnormality  7/25/2022 - continue current rx  11/21/2023-seeing well with current Rx and ground in prism  1/15/2025-slight adjustment in Rx.  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 Has port and still getting some MTX  11/21/2023 - Finished XRT in July. Systemic chemo continuing.   1/15/2025-now off chemotherapy and s/p XRT.  Doing well.  Being followed by hematology oncology.  No development of retinal vascular abnormality        Ephraim Carranza M.D.

## 2025-01-15 NOTE — ASSESSMENT & PLAN NOTE
11/21/2023-IOP stable.  Slight increased cup-to-disc.  Optic nerve head appearance is stable.  Does have slight change in OCT neurofibrillary thickness that will monitor.  OCT neurofibrillary thickness OD 85 OS 87 today  1/15/2025-IOP stable.  Slight increased cup-to-disc with tilting.  OCT neurofibrillary thickness stable at 84 OD 87 OS

## 2025-01-15 NOTE — ASSESSMENT & PLAN NOTE
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 Blurry vision secondary to asthenopia from overlapping images and acuity improves under monocular conditions. 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 Less likely infectious process given no papilledema, headaches or other meningeal signs. Could also be some form of ischemic process if BP fluctuations during induction. Recommend MRI orbits with citlali and repeat LP. Recommend that he could wear eye patch.   7/25/2022 - Has almost full abduction of the left eye, but has a relatively comitant esotropia. Uncertain if this is as the nerve heals, or being left with a comitant strabismus. In PAT did well wih a 12 base out prism OS. Will re-eval in 4 to 6 weeks and if stable will grind in or if improved will taper the prism  8/31/2022 - Doing will with the prism glasses. Showing some slight improvement. Measuring 10 diopters today. Thus will continue to monitor   11/9/2022 - stable at around 10 prisms. Placed new press on prism. However has vision benefit and considering new rx, so will give with 5 base out to grind in.  Discussed waiting 8 months to a year prior to strabismus surgical repair  11/21/2023 - Has rx prism 5 base out ground in OU.  Doing well orthotropic in primary position.  Extraocular muscles essentially full suggesting following improvement of 6th nerve palsy is left with a competent esotropia.  Since it has not progressed and happy with prisms we will continue to monitor  1/15/2025-overall stable.  Doing well with 5 base out prism OU.  Ground in.  Would continue.  Slight adjustment in distance myopic correction

## 2025-01-15 NOTE — ASSESSMENT & PLAN NOTE
6/3/32880 0- high myopia. Has old glasses and under monocular testing vision improves near baseline. No apparent retinal abnormality  7/25/2022 - continue current rx  11/21/2023-seeing well with current Rx and ground in prism  1/15/2025-slight adjustment in Rx.  Continue base out prism

## 2025-01-15 NOTE — ASSESSMENT & PLAN NOTE
8/31/2022 - Having bone marrow biopsy tomorrow  11/9/2022 - End of march is end of treatment, one XRT left. Has port and still getting some MTX  11/21/2023 - Finished XRT in July. Systemic chemo continuing.   1/15/2025-now off chemotherapy and s/p XRT.  Doing well.  Being followed by hematology oncology.  No development of retinal vascular abnormality

## 2025-01-21 NOTE — PROGRESS NOTES
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 decreased fatigue.  Blood counts obtained at the time demonstrated a WBC of 0.4, Hgb 8.8 and platelets 125.  ANC at the time was measured at only 170.  JULY was otherwise clinically well appearing.  He did receive a one-time normal saline bolus for his nausea and vomiting and was sent home with instructions to HOLD his oral mercaptopurine and oral methotrexate which began being held on 7/28/2023.  Per protocol, imatinib was continued at previous dose of 600 mg in the morning. Tomas Roy would return to clinic again on 8/2/2023 and 8/7/2023.  On both occasions, ANC remained under 500 and oral therapy (with the exception of imatinib) continue to be held while awaiting count recovery.  Ultimately, on 8/11/2023 after 14 days of therapy being held, Tomas Roy returned to clinic and WBC had increased to 2.1 with ANC increasing to 1500 with an absolute  monocyte count of 290. Tomas Roy was then instructed to resume his oral chemotherapy at 100% of prior dosing with (due to timing with Day 1 of Cycle 2 with LP 3 days later, no weekly MTX was administered).  Imatinib was never held.  On 8/14/2023 he was seen in clinic for Day 1 of Cycle 2 of Maintenance with lumbar puncture, vincristine, and 5-day pulse of steroids.  At the time, his ANC was 2010 and his oral chemotherapy was continued at 100% dosing.  Given his history of previously drop in counts, he was scheduled to come back for repeat labs on 8/29/2023 at which point his WBC count was 1.4 and his ANC remained greater than 500 at 1140.  Again, his therapy was continued with imatinib 550 mg by mouth in the morning as well as 100% dosing of methotrexate and 100% dosing of 6-mercaptopurine.  When Tomas Roy returned to clinic on 9/11/2023 for his Maintenance, Cycle 2, Day 29 therapy he was noted to have had another drop in his WBC to 600 and his ANC accordingly was also decreased at 410.  He did receive vincristine in accordance with protocol as well as starting a 5-day pulse of prednisone per protocol.  Given however that his ANC had dropped below 500 his oral methotrexate and oral 6-MP were again held.  He was instructed return to clinic 1 week later for lab evaluations.  On 9/19/2023 he returned to clinic and WBC had improved slightly to 0.8 however ANC remained low at 260 with an absolute monocyte count of 220.  Given that counts not recovered his oral chemotherapy remained held for an additional week.  On 9/26/2023 at 14 days after initially holding chemotherapy, he was seen back in clinic at which time WBC improved again to 1.1 however ANC did not quite recover and remained at 490 with an absolute monocyte count of 330.  Given that 2 weeks had elapsed with myelosuppression, imatinib was held in addition to oral 6-MP and methotrexate. Tomas Roy was instructed to return to clinic on 9/29/2023  for repeat counts.  On 9/29/2023 WBC had improved to 2.4 and ANC had finally recovered with 1530 and an absolute monocyte count of 510.  Given a recovery with ANC greater than 750 and platelets greater than 75, oral chemotherapy was restarted at 50% of initial dosing and imatinib was restarted at 100% of initial dosing.  On 10/9/2023, Tomas Roy returned to clinic for his Day 57 of Cycle 2 visit.  At the time of his visit, his ANC had recovered after holding chemotherapy and then restarting at 50% dosing 11 days prior.  He did however in clinic spiked a temperature 102.5 °F.  For this reason despite his ANC being improved, no dose adjustments were made in his chemotherapy.  He continued with 50% dosing with 100% adherence of his 6-MP and methotrexate as well as his imatinib at 550 mg PO QHS.   Tomas Roy was then seen in clinic again on 11/6/2023 for his Day 1 of Cycle 3 of Maintenance therapy.  At the time, his imatinib dose was adjusted back up to 600 mg daily taken in the morning.  Additionally, his methotrexate was adjusted back up to 75% of expected dosing and his mercaptopurine was increased to 75% of expected dosing as well.  He will return to clinic on 12/4/2023 for his Day 29 of Cycle 3 therapy.  At the time, his ANC was exactly 1500 and therefore no dose adjustments were made and he continued at 75% dosing for oral chemotherapy as well as the imatinib dose of 600 mg daily.  On 1/2/2024 he was seen for his Day 57 evaluations and his ANC had dropped to 1450 again not prompting any change in oral chemotherapy dosing.  Tomas Roy completed his Cycle 4 chemotherapy without any adverse events or complications.  He did not have any changes in medications either.  Most recently,  Tomas Roy was seen in clinic on 4/22/2023 for his Day 1 of Cycle 5 chemotherapy.  At the time, a lumbar puncture was performed and IT chemotherapy was provided.  He tolerated the procedure and the therapy well  without any sequelae.  His ANC at the time was > 1500 however the dose adjustment was made as this was the first ANC greater than 1500 and Tomas Roy had a history of precipitous drops in his counts with increases in his oral chemotherapy.  On 5/20/2024, Tomas Roy presented to clinic for his Cycle 5, Day 29 therapy and evaluations. At that time, he was started on 5 day pulse of prednisone and his oral methotrexate dose was increased to 13 tablets PO weekly (90.78% of expected dosing). His port was removed on 5/20/2024 by Dr. Moise. He completed therapy on 5/30/2024. Tomas Roy was most recently seen in clinic on 10/15/2024 for his 4.5-month month off therapy visit.   Today he presents for his 7.5 months of therapy visit.  No significant interval history reported.    On presentation, Tomas Roy  reports that he is clinically well. He denies any recent or remote illness.  No complaints of any headaches, changes in vision or neurological complaints with exception for peripheral neuropathy which is still quite debilitating.  Continues to use hydrocortisone cream which improved his pain and rash.      Review of Systems:     Constitutional: Afebrile.  Without recent illness.  Energy and activity are good.   HENT: Negative for ear pain, nasal congestion or rhinorrhea, nosebleeds and sore throat.  No mouth sores.  Eyes: Negative for visual changes.  Respiratory: Negative for shortness of breath or noisy breathing.   Cardiovascular: Negative for chest pain or extremity swelling.    Gastrointestinal: Negative for nausea, vomiting, abdominal pain, diarrhea, constipation or blood in stool.    Genitourinary: Negative for painful urination, blood in urine or flank pain.    Musculoskeletal: Negative for joint or muscle pains.    Skin: Negative for rash, signs of infection.  Neurological: Negative for numbness, tingling, sensory changes, weakness or headaches.    Endo/Heme/Allergies: Does not bruise/bleed  easily.    Psychiatric/Behavioral: No changes in mood, appropriate for age.     PAST MEDICAL HISTORY:     Oncologic History:  2-3 week history of worsening fatigue, right lower extremity pain  Presentation to OS and diagnosed with right LE superficial thrombus, subsegmental PE and hyperleukocytosis, anemia and thrombocytopenia  Transferred to [facility] for definitive care  Presenting (local) WBC > 440K, Hgb 10.0, platelets 53, (automated differential ANC 3190, ALC 75,310, absolute monocyte count 47717, absolute blast count 340,560)  Uric Acid 15.6, phosphorus 5.6, LDH 1114  Rasburicase x 1 dose given   Peripheral Blood flow cytometry demonstrating CD10 pos, CD19 pos, CD20 neg, CD22 dim (60%) 5/28/2022  Peripheral blood FISH for BCR-ABL1 positive in 98% of analyzed cells     Age at Diagnosis: 21 years  White Blood Cell Count at Presentation: > 440 k/uL  Testicular Disease Status: Negative (see procedure note 5/30/2022)  CNS Status: 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, diagnostic LP with WBC 1, RBC 3 and no evidence of leukemic blasts 5/30/2022  Steroid Pre-treatment: None  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     All inclusion/exclusion criteria for [id number] met and consent signed - enrolled 5/29/2022   All inclusion/exclusion criteria for [id number] met and consent signed - enrolled 5/30/2022  Confirmatory bone marrow aspirate and biopsy and diagnostic LP + cytarabine 70 mg IT 5/30/2022  Induction therapy (ON STUDY [id number]) started 5/30/2022  Bone marrow immunohistochemistry consistent with diagnosis of B-ALL comprising 90% of marrow cellularity  Bone marrow sample sent to [facility] for COG purposes:  Flow cytom  Of the blood pressure little high that is a problem is a cultural problem is well and cultural genetic and everything else like that unfortunately breathalyzers such bad heart disease diabetes things like that   populations etry consistent with peripheral blood, cytogenetics remarkable for known t(9;22)  CSF with WBC 1, RBC 3, no blasts identified on cytospin  FISH results available 5/31/2022 making patient eligible for transfer from [place] [address] to [place] [address] as eligibility requirements were met for [place] [address]  Patient unenrolled from [place] [address] (BCR-ABL1 fusion positive) 6/1/2022  Consent signed for [place] [address] and patient enrolled 6/1/2022 (see eligibility checklist from 5/31/2022 and consent note from 6/1/2022)  Imatinib 400 mg PO QAM / 200 mg PO QPM started 6/3/2022 (allowed per [place] [address])  Patient completed the first 15 days of a Standard 4-drug Induction on 6/13/2022  Start of [place] [address](OS), Induction IA Part 2, Day 15 6/13/2022  End of Induction 1A Part 2 - MRD negative  Start of [place] [address](OS), Induction IA Part 2, Day 15 7/5/2022  Induction IB DELAYED 2 weeks 14 days from 7/26/2022-8/9/2022) for myelosuppression - Start of Day 22 cytarabine block 8/9/2022  Induction IB Day 42 delayed 9 days for myelosuppression - Day 42 evaluations 9/7/2022  End of Induction IB - Flow cytometric MRD negative, MRD by IgH-TCR PCR [id number]%  Randomization to AR-Experimental Arm B of [place] [address]  Start of AR-Experimental Arm B, Interim Maintenance 9/29/2022  Weight based increase in dose of imatinib to 400 mg PO AM and 250 mg PM 9/29/2022  Thrombocytopenia not permissive of proceeding with Day 15 of Interim Maintenance  AR-Experimental Arm B, Interim Maintenance, Day 15 delayed 4 days, start 10/17/2022  AR-Experimental Arm B, Interim Maintenance, Day 29, start 11/1/2022  AR-Experimental Arm B, Interim Maintenance, Day 43, start 11/14/2022  Last does of 6-MP 11/27/2022  AR-Experimental Arm B, Delayed Intensification, Day 1, start 12/6/2022  Admission with Severe Septic Shock 12/27/2022  Imatinib HELD 12/27/2022-1/8/2023  AR-Experimental Arm B, Delayed Intensification, Day 29 DELAYED 14 days with start 1/17/2023  Hospitalization 2/7/2023 on Day  "50 of Delayed Intensification with left shoulder pain ultimately diagnosed with Bacteroides fragilis infection  AR-Experimental Arm B, Interim Maintenance, Day 1 DELAYED 7 days with start 2/27/2023  AR-Experimental Arm B, Interim Maintenance, Day 11 DELAYED 4 days for platelets 43K on 3/9/2023  AR-Experimental Arm B, Interim Maintenance, Day 11 VCR given and MTX omitted for platelets 43K 3/13/2023  Imatinib HELD 3/30/2023-4/11/2023  AR-Experimental Arm B, Interim Maintenance, Day 21 (DELAYED 22 DAYS) - administered 4/11/2023 with methotrexate 80 mg/m² IV  AR-Experimental Arm B, Interim Maintenance, Day 31 (\"true\" Day 31 4/25/2023) - VCR and IT MTX given 4/28/2023 - IV MTX omitted  AR-Experimental Arm B, Interim Maintenance, Day 41 met with counts - administered 5/5/2023 with methotrexate 80 mg/m² IV     Start of Maintenance, Cycle 1, Day 1 -5/22/2023  Cranial radiation 6/5/2023-6/16/2023 (10 fractions)  Maintenance, Cycle 1, Day 29 - 6/23/2023 (no IT MTX given)  Maintenance, Cycle 1, Day 67, presented with fatigue nausea and vomiting found to have ANC less than 500  Methotrexate (oral) and mercaptopurine held 7/27/2023 - continued with imatinib  Methotrexate (oral) and mercaptopurine held 8/2/2023 - continued with imatinib  Methotrexate (oral) and mercaptopurine held 8/7/2023 - continued with imatinib  Restarted methotrexate (oral) and mercaptopurine at 100% previous dosing on 8/11/2023 - continued with imatinib  Maintenance, Cycle 2, Day 1 - 8/14/2023  Maintenance, Cycle 2, Day 29 - 9/11/2023  Methotrexate (oral) and mercaptopurine held 9/11/2023 - continued with imatinib  Methotrexate (oral) and mercaptopurine held 9/19/2023 - continued with imatinib  Methotrexate (oral) and mercaptopurine held 9/26/2023 - HELD imatinib  Methotrexate (oral) restarted at 50% dosing and mercaptopurine restarted at 50% dosing 9/29/2023 - RESTARTED imatinib  Maintenance, Cycle 2, Day 57 - 10/9/2023  Maintenance, Cycle 3, Day 1 - " 11/6/2023: Methotrexate (oral) increased to 75% dosing and mercaptopurine increased to 75% dosing.  Imatinib increased to 600 mg QAM 11/6/2023  Maintenance Cycle 3, Day 29: 12/4/2023 No changes made to the dosing of oral chemotherapy  Maintenance, Cycle 4, Day 1: No dose adjustments made however ANC slightly greater than >1500.  Oral chemotherapy did not need weight adjustment.  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  3) Hyperleukocytosis  4) Hyperuricemia  5) Hyperphosphatemia  6) Right Lower Extremity Superficial Thrombus  7) Subsegmental Pulmonary Embolism  8) 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  9) Bacteroides fragilis soft tissue infection left shoulder  10) Anxiety/Depression     Past Surgical History:     1) Temporary Right IJ Pharesis Catheter Placement 5/28/2022  2) Right-sided Port-A-Cath placement 8/29/2022  3) IR drainage left calf hematoma  4) Left shoulder I&D  5) Cranial XRT 6/5/2023-6/16/2023     Birth/Developmental History:   1st of three children  Unremarkable pregnancy  Unremarkable delivery     Family History:  No significant family history of cancer.  Both maternal and paternal family history of diabetes.     Immunizations:  UTD     Social History:   Lives with girlfriend.  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  Doing well with school, studying currently for upcoming professional engineering exam.    Medications:   Current Outpatient Medications on File Prior to Encounter   Medication Sig Dispense Refill    hydrocortisone 1 % Cream Apply 1 Application topically 2 times a day. 28 g 2     No current facility-administered medications on file prior to encounter.     OBJECTIVE:     Vitals:   /72 (BP Location: Right arm, Patient Position: Sitting, BP Cuff Size: Adult)   Pulse 88   Temp 36.8 °C (98.2 °F)  "(Temporal)   Ht 1.66 m (5' 5.35\")   Wt 81 kg (178 lb 9.2 oz)   SpO2 98%   BMI 29.39 kg/m²     Labs:     Hospital Outpatient Visit on 10/15/2024   Component Date Value    Sodium 10/15/2024 140     Potassium 10/15/2024 4.3     Chloride 10/15/2024 105     Co2 10/15/2024 22     Anion Gap 10/15/2024 13.0     Glucose 10/15/2024 93     Bun 10/15/2024 13     Creatinine 10/15/2024 0.60     Calcium 10/15/2024 9.3     Correct Calcium 10/15/2024 8.9     AST(SGOT) 10/15/2024 17     ALT(SGPT) 10/15/2024 18     Alkaline Phosphatase 10/15/2024 103 (H)     Total Bilirubin 10/15/2024 0.3     Albumin 10/15/2024 4.5     Total Protein 10/15/2024 7.3     Globulin 10/15/2024 2.8     A-G Ratio 10/15/2024 1.6     WBC 10/15/2024 5.7     RBC 10/15/2024 5.48     Hemoglobin 10/15/2024 15.9     Hematocrit 10/15/2024 47.7     MCV 10/15/2024 87.0     MCH 10/15/2024 29.0     MCHC 10/15/2024 33.3     RDW 10/15/2024 40.7     Platelet Count 10/15/2024 250     MPV 10/15/2024 9.5     Neutrophils-Polys 10/15/2024 64.50     Lymphocytes 10/15/2024 21.40 (L)     Monocytes 10/15/2024 11.30     Eosinophils 10/15/2024 1.90     Basophils 10/15/2024 0.40     Immature Granulocytes 10/15/2024 0.50     Nucleated RBC 10/15/2024 0.00     Neutrophils (Absolute) 10/15/2024 3.65     Lymphs (Absolute) 10/15/2024 1.21     Monos (Absolute) 10/15/2024 0.64     Eos (Absolute) 10/15/2024 0.11     Baso (Absolute) 10/15/2024 0.02     Immature Granulocytes (a* 10/15/2024 0.03     NRBC (Absolute) 10/15/2024 0.00     GFR (CKD-EPI) 10/15/2024 139       Physical Exam:    Constitutional: Well-developed, well-nourished, and in no distress.  Well appearing.  HENT: Normocephalic and atraumatic. No nasal congestion or rhinorrhea. Oropharynx is clear and moist. No oral ulcerations or sores.    Eyes: Conjunctivae are normal. Pupils are equal, round, and reactive to light.    Neck: Normal range of motion of neck, no adenopathy.    Cardiovascular: Normal rate, regular rhythm and normal " heart sounds.  No murmur heard. DP/radial pulses 2+, cap refill < 2 sec  Pulmonary/Chest: Effort normal and breath sounds normal. No respiratory distress. Symmetric expansion.  No crackles or wheezes.  Abdomen: Soft. Bowel sounds are normal. No distension and no mass. There is no hepatosplenomegaly.    Genitourinary:  Deferred  Musculoskeletal: Normal range of motion of lower and upper extremities bilaterally. No tenderness to palpation of elbows, wrists, hands, knees, ankles and feet bilaterally.   Lymphadenopathy: No cervical adenopathy, axillary adenopathy or inguinal adenopathy.   Neurological: Alert and oriented to person and place. Exhibits normal muscle tone bilaterally in upper and lower extremities. Gait normal. Coordination normal.    Skin: Skin is warm, dry and pink.  No rash or evidence of skin infection.  No pallor.   Psychiatric: Mood and affect normal for age.    ASSESSMENT AND PLAN:     Tomas Jean-Baptiste is a previously healthy 24 year old male with Precursor B-Cell Lymphoblastic Leukemia with BCR-ABL1 fusion and whose End of Induction IB MRD was both negative by flow cytometry and PCR who presents for scheduled  month off therapy visit     1) Ph+ Precursor B-Cell Acute Lymphoblastic Leukemia, in MRD Remission:               - 2-3 weeks of symptoms  - Presenting WBC > 440 k/uL, hyperleukocytosis  - Start of Hydroxyurea (1500 mg PO Q8) 2320 on 5/27/2022  - discontinued after only 55 hours  - No steroid pretreatment  - 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  - Testicular status NEGATIVE       - Flow cytometry of both 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, FISH positive for BCR-ABL1 translocation  - Enrolled on [facility] [id number]  - Initially enrolled on [facility] [id number] - but 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                            - Enrolled on [facility] [id number] and began study 6/13/2022              - Started imatinib therapy 6/3/2022   -  End of Induction IA and IB MRD negative  - On Imatinib and completed therapy  - Received whole brain radiation therapy from 6/5/2024 until 6/16/2024: 1800 cGy      [id number](OS), AR Arm B, 7.5 months off-therapy   - Complete therapy 5/30/2024  - End of therapy bone study and ECHO obtained on 6/5/2024     2) Myelosuppression Secondary To Chemotherapy (RESOLVED):            - WBC 5400, Hgb 16.6, platelets 277   - ANC 3240, ALC 1780, abs monocyte count 240    3) At Risk of Opportunistic Lung Infection:  -Okay to discontinue Bactrim at this time     4) 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              - Psychosocial:  Much improved.  Has reunited with father and is working on relationship with mother.               - Renal:  /72 mm Hg.  Creatinine 0.81 mg/dL, Electrolytes: WNL. Will continue to monitor              - Cardiac: Daunorubicin 100 mg/m2 and Doxorubicin 75 mg/m2. Hence, cumulative  Doxorubicin equivalent isotoxic dose is 158.3 mg/m2.  No radiation.  Given low-dose anthracycline without radiation to chest, recommendation for ECHO every 5 years.  Next ECHO in 2029              - Pulmonary:  No chest/mediastinal radiation, no exposure to chemotherapies with pulmonary toxicity.              - Musckuloskeletal: Bone age obtained on 6/5/2024: No concerns. Vitamin D 14.  Encouraged supplementation.              - Growth and Development: Continues gaining weight.              - Reproductive: Has previously been offered analysis of reproductive capacity.  Has not yet asked for analysis.              - Candace: Chris     8) Health Maintenance:             - Encouraged to FU with dentists and optometrist/ophthalmologist 6 months to yearly             - Encouraged to get  Flu/Covid vaccine (have to wait atleast 1 year post completion of therapy to receive any other vaccinations)       Disposition: RTC in 2-3 months     Pepe Faye MD  Pediatric Hematology / Oncology  [facility]  Cell.  [phone]  Office. [phone]

## 2025-02-03 ENCOUNTER — TELEMEDICINE (OUTPATIENT)
Dept: PSYCHOLOGY | Facility: MEDICAL CENTER | Age: 25
End: 2025-02-03
Attending: PSYCHOLOGIST
Payer: COMMERCIAL

## 2025-02-03 DIAGNOSIS — C91.Z0 B LYMPHOBLASTIC LEUKEMIA WITH T(9;22)(Q34;Q11.2);BCR-ABL1 (HCC): ICD-10-CM

## 2025-02-03 PROCEDURE — 96159 HLTH BHV IVNTJ INDIV EA ADDL: CPT | Mod: 95 | Performed by: PSYCHOLOGIST

## 2025-02-03 PROCEDURE — 96158 HLTH BHV IVNTJ INDIV 1ST 30: CPT | Mod: 95 | Performed by: PSYCHOLOGIST

## 2025-02-04 NOTE — PROGRESS NOTES
PEDIATRIC BEHAVIORAL HEALTH VISIT    ADULT REQUEST FOR CONFIDENTIALITY    Name:  Tomas Jean-Baptiste  MRN:  [id number]  :  2000  Age:  24 y.o.  Referring Provider: Dr. Faye (Hem/Onc)  Pediatrician:  Margarito Arvizu M.D.  Date of Service:  2025    Discussed with patient: You have chosen to receive care through the use of secure virtual/telemedicine. This platform enables health care providers at different locations to provide safe, effective, and convenient care through the use of technology. As with any health care service, there are risks associated with the use of this platform, including equipment failure, poor image resolution or no image, and  issues. You also understand that I cannot physically examine you and that you may need to come to clinic to complete the assessment.    Patient verbally asserts understanding of the risks and benefits of telemedicine as explained. Endorses all questions answered regarding telemedicine.     I conducted this encounter from [facility] via secure, live, telephone or audio/video conference with the patient. Patient was located in [place], [place]. Prior to the interview, the risks and benefits of telemedicine were discussed with the patient and verbal consent was obtained.     *Session needed to be done by phone as patient had poor connection using Teams for video visit.    Persons in Attendance: Tomas Roy     Chief Complaint/ Reason for Appointment: JULY, a now 23 y/o male currently in treatment for [place] Chromosome Precursor B-Cell Acute Lymphoblastic Leukemia was referred to counseling to assist in decreasing anxiety he has been experiencing and processing ways for how he can find himself now and what life will look like. See intake note dated 23    Mental Status Exam:   General description cooperative with interview  Interactional style Culturally appropriate  Eye contact N/A telephone  Speech  Unimpaired, fluid and clear, normal rate and rythem  Motor activity N/A telephone  Orientation Oriented to person time, place and situation  Intellectual functioning Unimpaired  Memory Unimpaired  Attention and concentration Intact and normative concentration  Fund of knowledge Average  Mood Generally euthymic  Affect Appropriate  Perceptual Disturbances None apparent  Thought Process  No abnormalities apparent       Associations Unimpaired associations       Abstractions Normal abstractions, intact       Insight Insight - adequate and normative       Judgment Judgments - intact and normative   Thought Content  No apparent delusions    Risk Assessment:  Tomas Roy denied current concerns regarding risk to self or others.       Issues Discussed:   This provider met with JULY to continue assisting in rediscovering himself after the trauma of his cancer diagnosis and treatment as well as how childhood impacts this. Today JULY processed a recent interaction with his mother and siblings and reviewed the positives he is experiencing in his life. Reviewed his thoughts/feelings regarding his interaction and what it brought up in himself. Overall, JULY appears to be coping with his current family dynamics and focusing on the good in his life now. He has now graduating and working for [employer] and has plans to move into an apartment in April.     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 After meeting with JULY during his intake, it appears he could benefit from learning anxiety management skills, processing what he has been through, and how to live the life  he wants. Tomas Lees Jean-Baptiste could benefit from learning appropriate ways of expressing and coping with his emotions. He could also benefit from learning Cognitive Behavioral Therapy (CBT) and Acceptance and Commitment Therapy (ACT) tools to understand the interaction of thoughts, feelings, and actions. As well as Trauma Focused-CBT to process his cancer journey. Tomas Islamron Lees Jean-Baptiste agreed with the plan.       PLAN  JULY will learn and utilize appropriate ways to express and cope with emotions.    He will learn the connection between thoughts, feelings, and actions utilizing CBT and ACT tools.,   Processed the thoughts that arise around his mother and her actions.  JULY will process how their medical diagnosis is impacting their life.    This provider will check-in with JULY mid-April.     The above diagnostic impressions, recommendations, and treatment plan were discussed with and agreed upon by Tomas Roy, and his caregivers. Care will be coordinated with Tomas Roy's healthcare team, as appropriate.    Total time spent on encounter was 60 minutes.    Laurie Ortega, PhD  Pediatric Psychologist   Licensed Psychologist, [place] # [id number]  [facility], Behavioral Health

## 2025-02-27 ENCOUNTER — HOSPITAL ENCOUNTER (OUTPATIENT)
Dept: PEDIATRIC HEMATOLOGY/ONCOLOGY | Facility: MEDICAL CENTER | Age: 25
End: 2025-02-27
Attending: PEDIATRICS
Payer: COMMERCIAL

## 2025-02-27 ENCOUNTER — HOSPITAL ENCOUNTER (INPATIENT)
Facility: MEDICAL CENTER | Age: 25
End: 2025-02-27
Attending: PEDIATRICS | Admitting: PEDIATRICS
Payer: COMMERCIAL

## 2025-02-27 ENCOUNTER — HOSPITAL ENCOUNTER (OUTPATIENT)
Facility: MEDICAL CENTER | Age: 25
End: 2025-02-27
Attending: PEDIATRICS
Payer: COMMERCIAL

## 2025-02-27 VITALS
OXYGEN SATURATION: 98 % | HEART RATE: 133 BPM | TEMPERATURE: 100.6 F | SYSTOLIC BLOOD PRESSURE: 129 MMHG | DIASTOLIC BLOOD PRESSURE: 74 MMHG | WEIGHT: 174.16 LBS | HEIGHT: 65 IN | BODY MASS INDEX: 29.02 KG/M2

## 2025-02-27 DIAGNOSIS — R53.81 MALAISE: ICD-10-CM

## 2025-02-27 DIAGNOSIS — D70.9 FEVER AND NEUTROPENIA (HCC): ICD-10-CM

## 2025-02-27 DIAGNOSIS — C91.Z0 B LYMPHOBLASTIC LEUKEMIA WITH T(9;22)(Q34;Q11.2);BCR-ABL1 (HCC): ICD-10-CM

## 2025-02-27 DIAGNOSIS — R52 PAIN: ICD-10-CM

## 2025-02-27 DIAGNOSIS — C91.Z0 B LYMPHOBLASTIC LEUKEMIA WITH T(9;22)(Q34;Q11.2);BCR-ABL1 (HCC): Primary | ICD-10-CM

## 2025-02-27 DIAGNOSIS — R50.81 FEVER AND NEUTROPENIA (HCC): ICD-10-CM

## 2025-02-27 DIAGNOSIS — C91.00 PHILADELPHIA CHROMOSOME POSITIVE ACUTE LYMPHOBLASTIC LEUKEMIA (HCC): ICD-10-CM

## 2025-02-27 LAB
ALBUMIN SERPL BCP-MCNC: 4.4 G/DL (ref 3.2–4.9)
ALBUMIN SERPL BCP-MCNC: 4.5 G/DL (ref 3.2–4.9)
ALBUMIN/GLOB SERPL: 1.5 G/DL
ALBUMIN/GLOB SERPL: 1.6 G/DL
ALP SERPL-CCNC: 102 U/L (ref 30–99)
ALP SERPL-CCNC: 103 U/L (ref 30–99)
ALT SERPL-CCNC: 13 U/L (ref 2–50)
ALT SERPL-CCNC: 14 U/L (ref 2–50)
ANION GAP SERPL CALC-SCNC: 11 MMOL/L (ref 7–16)
ANION GAP SERPL CALC-SCNC: 14 MMOL/L (ref 7–16)
ANISOCYTOSIS BLD QL SMEAR: ABNORMAL
ANISOCYTOSIS BLD QL SMEAR: ABNORMAL
AST SERPL-CCNC: 21 U/L (ref 12–45)
AST SERPL-CCNC: 23 U/L (ref 12–45)
B PARAP IS1001 DNA NPH QL NAA+NON-PROBE: NOT DETECTED
B PERT.PT PRMT NPH QL NAA+NON-PROBE: NOT DETECTED
BASOPHILS # BLD AUTO: 0 % (ref 0–1.8)
BASOPHILS # BLD AUTO: 0 % (ref 0–1.8)
BASOPHILS # BLD: 0 K/UL (ref 0–0.12)
BASOPHILS # BLD: 0 K/UL (ref 0–0.12)
BILIRUB SERPL-MCNC: 1.2 MG/DL (ref 0.1–1.5)
BILIRUB SERPL-MCNC: 1.3 MG/DL (ref 0.1–1.5)
BUN SERPL-MCNC: 13 MG/DL (ref 8–22)
BUN SERPL-MCNC: 14 MG/DL (ref 8–22)
C PNEUM DNA NPH QL NAA+NON-PROBE: NOT DETECTED
CALCIUM ALBUM COR SERPL-MCNC: 8.4 MG/DL (ref 8.5–10.5)
CALCIUM ALBUM COR SERPL-MCNC: 8.5 MG/DL (ref 8.5–10.5)
CALCIUM SERPL-MCNC: 8.8 MG/DL (ref 8.5–10.5)
CALCIUM SERPL-MCNC: 8.8 MG/DL (ref 8.5–10.5)
CHLORIDE SERPL-SCNC: 98 MMOL/L (ref 96–112)
CHLORIDE SERPL-SCNC: 98 MMOL/L (ref 96–112)
CO2 SERPL-SCNC: 21 MMOL/L (ref 20–33)
CO2 SERPL-SCNC: 23 MMOL/L (ref 20–33)
CREAT SERPL-MCNC: 0.94 MG/DL (ref 0.5–1.4)
CREAT SERPL-MCNC: 0.96 MG/DL (ref 0.5–1.4)
EOSINOPHIL # BLD AUTO: 0 K/UL (ref 0–0.51)
EOSINOPHIL # BLD AUTO: 0 K/UL (ref 0–0.51)
EOSINOPHIL NFR BLD: 0 % (ref 0–6.9)
EOSINOPHIL NFR BLD: 0 % (ref 0–6.9)
ERYTHROCYTE [DISTWIDTH] IN BLOOD BY AUTOMATED COUNT: 33.6 FL (ref 35.9–50)
ERYTHROCYTE [DISTWIDTH] IN BLOOD BY AUTOMATED COUNT: 34.6 FL (ref 35.9–50)
FLUAV RNA NPH QL NAA+NON-PROBE: NOT DETECTED
FLUBV RNA NPH QL NAA+NON-PROBE: NOT DETECTED
GFR SERPLBLD CREATININE-BSD FMLA CKD-EPI: 114 ML/MIN/1.73 M 2
GFR SERPLBLD CREATININE-BSD FMLA CKD-EPI: 117 ML/MIN/1.73 M 2
GLOBULIN SER CALC-MCNC: 2.8 G/DL (ref 1.9–3.5)
GLOBULIN SER CALC-MCNC: 2.9 G/DL (ref 1.9–3.5)
GLUCOSE SERPL-MCNC: 106 MG/DL (ref 65–99)
GLUCOSE SERPL-MCNC: 112 MG/DL (ref 65–99)
HADV DNA NPH QL NAA+NON-PROBE: NOT DETECTED
HCOV 229E RNA NPH QL NAA+NON-PROBE: NOT DETECTED
HCOV HKU1 RNA NPH QL NAA+NON-PROBE: NOT DETECTED
HCOV NL63 RNA NPH QL NAA+NON-PROBE: NOT DETECTED
HCOV OC43 RNA NPH QL NAA+NON-PROBE: NOT DETECTED
HCT VFR BLD AUTO: 30 % (ref 42–52)
HCT VFR BLD AUTO: 30 % (ref 42–52)
HGB BLD-MCNC: 10.4 G/DL (ref 14–18)
HGB BLD-MCNC: 10.6 G/DL (ref 14–18)
HMPV RNA NPH QL NAA+NON-PROBE: NOT DETECTED
HPIV1 RNA NPH QL NAA+NON-PROBE: NOT DETECTED
HPIV2 RNA NPH QL NAA+NON-PROBE: NOT DETECTED
HPIV3 RNA NPH QL NAA+NON-PROBE: NOT DETECTED
HPIV4 RNA NPH QL NAA+NON-PROBE: NOT DETECTED
LDH SERPL L TO P-CCNC: 244 U/L (ref 107–266)
LYMPHOCYTES # BLD AUTO: 0.97 K/UL (ref 1–4.8)
LYMPHOCYTES # BLD AUTO: 1.2 K/UL (ref 1–4.8)
LYMPHOCYTES NFR BLD: 100 % (ref 22–41)
LYMPHOCYTES NFR BLD: 97.4 % (ref 22–41)
M PNEUMO DNA NPH QL NAA+NON-PROBE: NOT DETECTED
MANUAL DIFF BLD: ABNORMAL
MANUAL DIFF BLD: NORMAL
MCH RBC QN AUTO: 27.9 PG (ref 27–33)
MCH RBC QN AUTO: 28 PG (ref 27–33)
MCHC RBC AUTO-ENTMCNC: 34.7 G/DL (ref 32.3–36.5)
MCHC RBC AUTO-ENTMCNC: 35.3 G/DL (ref 32.3–36.5)
MCV RBC AUTO: 79.4 FL (ref 81.4–97.8)
MCV RBC AUTO: 80.4 FL (ref 81.4–97.8)
MICROCYTES BLD QL SMEAR: ABNORMAL
MICROCYTES BLD QL SMEAR: ABNORMAL
MONOCYTES # BLD AUTO: 0 K/UL (ref 0–0.85)
MONOCYTES # BLD AUTO: 0.02 K/UL (ref 0–0.85)
MONOCYTES NFR BLD AUTO: 0 % (ref 0–13.4)
MONOCYTES NFR BLD AUTO: 1.7 % (ref 0–13.4)
MORPHOLOGY BLD-IMP: NORMAL
NEUTROPHILS # BLD AUTO: 0 K/UL (ref 1.82–7.42)
NEUTROPHILS # BLD AUTO: 0.01 K/UL (ref 1.82–7.42)
NEUTROPHILS NFR BLD: 0 % (ref 44–72)
NEUTROPHILS NFR BLD: 0.9 % (ref 44–72)
NRBC # BLD AUTO: 0 K/UL
NRBC BLD-RTO: 0 /100 WBC (ref 0–0.2)
OVALOCYTES BLD QL SMEAR: NORMAL
PLATELET # BLD AUTO: 51 K/UL (ref 164–446)
PLATELET # BLD AUTO: 53 K/UL (ref 164–446)
PLATELET BLD QL SMEAR: NORMAL
PLATELET BLD QL SMEAR: NORMAL
PLATELETS.RETICULATED NFR BLD AUTO: 5.6 % (ref 0.6–13.1)
PLATELETS.RETICULATED NFR BLD AUTO: 5.9 % (ref 0.6–13.1)
PMV BLD AUTO: 10.4 FL (ref 9–12.9)
PMV BLD AUTO: 11.3 FL (ref 9–12.9)
POIKILOCYTOSIS BLD QL SMEAR: NORMAL
POTASSIUM SERPL-SCNC: 3.9 MMOL/L (ref 3.6–5.5)
POTASSIUM SERPL-SCNC: 4 MMOL/L (ref 3.6–5.5)
PROT SERPL-MCNC: 7.3 G/DL (ref 6–8.2)
PROT SERPL-MCNC: 7.3 G/DL (ref 6–8.2)
RBC # BLD AUTO: 3.73 M/UL (ref 4.7–6.1)
RBC # BLD AUTO: 3.78 M/UL (ref 4.7–6.1)
RBC BLD AUTO: PRESENT
RBC BLD AUTO: PRESENT
RSV RNA NPH QL NAA+NON-PROBE: NOT DETECTED
RV+EV RNA NPH QL NAA+NON-PROBE: NOT DETECTED
SARS-COV-2 RNA NPH QL NAA+NON-PROBE: NOTDETECTED
SODIUM SERPL-SCNC: 132 MMOL/L (ref 135–145)
SODIUM SERPL-SCNC: 133 MMOL/L (ref 135–145)
STOMATOCYTES BLD QL SMEAR: NORMAL
URATE SERPL-MCNC: 7.2 MG/DL (ref 2.5–8.3)
VARIANT LYMPHS BLD QL SMEAR: ABNORMAL
VARIANT LYMPHS BLD QL SMEAR: NORMAL
WBC # BLD AUTO: 1 K/UL (ref 4.8–10.8)
WBC # BLD AUTO: 1.2 K/UL (ref 4.8–10.8)

## 2025-02-27 PROCEDURE — 99999 PR NO CHARGE: CPT | Performed by: PEDIATRICS

## 2025-02-27 PROCEDURE — 700111 HCHG RX REV CODE 636 W/ 250 OVERRIDE (IP): Mod: JZ | Performed by: PEDIATRICS

## 2025-02-27 PROCEDURE — 85007 BL SMEAR W/DIFF WBC COUNT: CPT

## 2025-02-27 PROCEDURE — 0202U NFCT DS 22 TRGT SARS-COV-2: CPT | Mod: GA

## 2025-02-27 PROCEDURE — A9270 NON-COVERED ITEM OR SERVICE: HCPCS | Performed by: PEDIATRICS

## 2025-02-27 PROCEDURE — 770004 HCHG ROOM/CARE - ONCOLOGY PRIVATE *

## 2025-02-27 PROCEDURE — 700105 HCHG RX REV CODE 258: Performed by: PEDIATRICS

## 2025-02-27 PROCEDURE — 85027 COMPLETE CBC AUTOMATED: CPT | Mod: 91

## 2025-02-27 PROCEDURE — 85007 BL SMEAR W/DIFF WBC COUNT: CPT | Mod: 91

## 2025-02-27 PROCEDURE — 36415 COLL VENOUS BLD VENIPUNCTURE: CPT

## 2025-02-27 PROCEDURE — 87040 BLOOD CULTURE FOR BACTERIA: CPT | Mod: 91

## 2025-02-27 PROCEDURE — 99223 1ST HOSP IP/OBS HIGH 75: CPT | Performed by: PEDIATRICS

## 2025-02-27 PROCEDURE — 85055 RETICULATED PLATELET ASSAY: CPT

## 2025-02-27 PROCEDURE — 700101 HCHG RX REV CODE 250: Performed by: PEDIATRICS

## 2025-02-27 PROCEDURE — 99213 OFFICE O/P EST LOW 20 MIN: CPT

## 2025-02-27 PROCEDURE — 86663 EPSTEIN-BARR ANTIBODY: CPT

## 2025-02-27 PROCEDURE — 85055 RETICULATED PLATELET ASSAY: CPT | Mod: 91

## 2025-02-27 PROCEDURE — 86665 EPSTEIN-BARR CAPSID VCA: CPT | Mod: 91

## 2025-02-27 PROCEDURE — 80053 COMPREHEN METABOLIC PANEL: CPT | Mod: 91

## 2025-02-27 PROCEDURE — 84550 ASSAY OF BLOOD/URIC ACID: CPT

## 2025-02-27 PROCEDURE — 80053 COMPREHEN METABOLIC PANEL: CPT

## 2025-02-27 PROCEDURE — 83615 LACTATE (LD) (LDH) ENZYME: CPT

## 2025-02-27 PROCEDURE — 86664 EPSTEIN-BARR NUCLEAR ANTIGEN: CPT

## 2025-02-27 PROCEDURE — 700102 HCHG RX REV CODE 250 W/ 637 OVERRIDE(OP): Performed by: PEDIATRICS

## 2025-02-27 PROCEDURE — 85027 COMPLETE CBC AUTOMATED: CPT

## 2025-02-27 RX ORDER — MUPIROCIN 20 MG/G
OINTMENT TOPICAL 3 TIMES DAILY
Status: DISCONTINUED | OUTPATIENT
Start: 2025-02-27 | End: 2025-03-09 | Stop reason: HOSPADM

## 2025-02-27 RX ORDER — CEPHALEXIN 500 MG/1
500 CAPSULE ORAL 3 TIMES DAILY
Qty: 21 CAPSULE | Refills: 0 | Status: SHIPPED | OUTPATIENT
Start: 2025-02-27 | End: 2025-02-27 | Stop reason: SDUPTHER

## 2025-02-27 RX ORDER — OXYCODONE HYDROCHLORIDE 5 MG/1
5 TABLET ORAL EVERY 4 HOURS PRN
Refills: 0 | Status: DISCONTINUED | OUTPATIENT
Start: 2025-02-27 | End: 2025-03-09 | Stop reason: HOSPADM

## 2025-02-27 RX ORDER — ACETAMINOPHEN 325 MG/1
650 TABLET ORAL EVERY 4 HOURS PRN
Status: DISCONTINUED | OUTPATIENT
Start: 2025-02-27 | End: 2025-03-09 | Stop reason: HOSPADM

## 2025-02-27 RX ORDER — MUPIROCIN CALCIUM 20 MG/G
1 CREAM TOPICAL 2 TIMES DAILY
Qty: 15 G | Refills: 0 | Status: ON HOLD | OUTPATIENT
Start: 2025-02-27

## 2025-02-27 RX ORDER — CEPHALEXIN 500 MG/1
500 CAPSULE ORAL 3 TIMES DAILY
Qty: 21 CAPSULE | Refills: 0 | Status: ON HOLD | OUTPATIENT
Start: 2025-02-27 | End: 2025-03-08

## 2025-02-27 RX ORDER — MUPIROCIN CALCIUM 20 MG/G
1 CREAM TOPICAL 2 TIMES DAILY
Qty: 15 G | Refills: 0 | Status: SHIPPED | OUTPATIENT
Start: 2025-02-27 | End: 2025-02-27 | Stop reason: SDUPTHER

## 2025-02-27 RX ORDER — SODIUM CHLORIDE 9 MG/ML
INJECTION, SOLUTION INTRAVENOUS CONTINUOUS
Status: DISCONTINUED | OUTPATIENT
Start: 2025-02-27 | End: 2025-03-09 | Stop reason: HOSPADM

## 2025-02-27 RX ADMIN — ACETAMINOPHEN 650 MG: 325 TABLET ORAL at 21:27

## 2025-02-27 RX ADMIN — MUPIROCIN 2 %: 20 OINTMENT TOPICAL at 22:08

## 2025-02-27 RX ADMIN — CEFEPIME 2 G: 2 INJECTION, POWDER, FOR SOLUTION INTRAVENOUS at 22:24

## 2025-02-27 RX ADMIN — OXYCODONE HYDROCHLORIDE 5 MG: 5 TABLET ORAL at 21:27

## 2025-02-27 RX ADMIN — SODIUM CHLORIDE: 9 INJECTION, SOLUTION INTRAVENOUS at 22:19

## 2025-02-27 SDOH — ECONOMIC STABILITY: TRANSPORTATION INSECURITY
IN THE PAST 12 MONTHS, HAS LACK OF RELIABLE TRANSPORTATION KEPT YOU FROM MEDICAL APPOINTMENTS, MEETINGS, WORK OR FROM GETTING THINGS NEEDED FOR DAILY LIVING?: NO

## 2025-02-27 SDOH — ECONOMIC STABILITY: TRANSPORTATION INSECURITY
IN THE PAST 12 MONTHS, HAS THE LACK OF TRANSPORTATION KEPT YOU FROM MEDICAL APPOINTMENTS OR FROM GETTING MEDICATIONS?: NO

## 2025-02-27 ASSESSMENT — FIBROSIS 4 INDEX
FIB4 SCORE: 0.46
FIB4 SCORE: 2.63

## 2025-02-27 ASSESSMENT — LIFESTYLE VARIABLES
HAVE PEOPLE ANNOYED YOU BY CRITICIZING YOUR DRINKING: NO
DOES PATIENT WANT TO STOP DRINKING: NO
TOTAL SCORE: 0
ON A TYPICAL DAY WHEN YOU DRINK ALCOHOL HOW MANY DRINKS DO YOU HAVE: 0
CONSUMPTION TOTAL: NEGATIVE
AVERAGE NUMBER OF DAYS PER WEEK YOU HAVE A DRINK CONTAINING ALCOHOL: 0
EVER FELT BAD OR GUILTY ABOUT YOUR DRINKING: NO
TOTAL SCORE: 0
TOTAL SCORE: 0
ALCOHOL_USE: NO
EVER HAD A DRINK FIRST THING IN THE MORNING TO STEADY YOUR NERVES TO GET RID OF A HANGOVER: NO
HOW MANY TIMES IN THE PAST YEAR HAVE YOU HAD 5 OR MORE DRINKS IN A DAY: 0
HAVE YOU EVER FELT YOU SHOULD CUT DOWN ON YOUR DRINKING: NO

## 2025-02-27 ASSESSMENT — COGNITIVE AND FUNCTIONAL STATUS - GENERAL
SUGGESTED CMS G CODE MODIFIER DAILY ACTIVITY: CH
DAILY ACTIVITIY SCORE: 24
MOBILITY SCORE: 24
SUGGESTED CMS G CODE MODIFIER MOBILITY: CH

## 2025-02-27 ASSESSMENT — PAIN DESCRIPTION - PAIN TYPE
TYPE: ACUTE PAIN

## 2025-02-27 ASSESSMENT — PATIENT HEALTH QUESTIONNAIRE - PHQ9
2. FEELING DOWN, DEPRESSED, IRRITABLE, OR HOPELESS: NOT AT ALL
1. LITTLE INTEREST OR PLEASURE IN DOING THINGS: NOT AT ALL
SUM OF ALL RESPONSES TO PHQ9 QUESTIONS 1 AND 2: 0

## 2025-02-27 ASSESSMENT — SOCIAL DETERMINANTS OF HEALTH (SDOH)
WITHIN THE PAST 12 MONTHS, THE FOOD YOU BOUGHT JUST DIDN'T LAST AND YOU DIDN'T HAVE MONEY TO GET MORE: NEVER TRUE
IN THE PAST 12 MONTHS, HAS THE ELECTRIC, GAS, OIL, OR WATER COMPANY THREATENED TO SHUT OFF SERVICE IN YOUR HOME?: NO
WITHIN THE LAST YEAR, HAVE TO BEEN RAPED OR FORCED TO HAVE ANY KIND OF SEXUAL ACTIVITY BY YOUR PARTNER OR EX-PARTNER?: NO
WITHIN THE LAST YEAR, HAVE YOU BEEN HUMILIATED OR EMOTIONALLY ABUSED IN OTHER WAYS BY YOUR PARTNER OR EX-PARTNER?: NO
WITHIN THE PAST 12 MONTHS, YOU WORRIED THAT YOUR FOOD WOULD RUN OUT BEFORE YOU GOT THE MONEY TO BUY MORE: NEVER TRUE
WITHIN THE LAST YEAR, HAVE YOU BEEN KICKED, HIT, SLAPPED, OR OTHERWISE PHYSICALLY HURT BY YOUR PARTNER OR EX-PARTNER?: NO
WITHIN THE LAST YEAR, HAVE YOU BEEN AFRAID OF YOUR PARTNER OR EX-PARTNER?: NO

## 2025-02-27 NOTE — PROGRESS NOTES
Pt to [facility], [place] for lab draw and DrReid visit. Labs drawn from the left ac without difficulty / with 1 attempt.   Pt tolerated well.  Visit with Dr. Faye completed. No further orders.

## 2025-02-28 ENCOUNTER — APPOINTMENT (OUTPATIENT)
Dept: RADIOLOGY | Facility: MEDICAL CENTER | Age: 25
DRG: 835 | End: 2025-02-28
Attending: STUDENT IN AN ORGANIZED HEALTH CARE EDUCATION/TRAINING PROGRAM
Payer: COMMERCIAL

## 2025-02-28 ENCOUNTER — APPOINTMENT (OUTPATIENT)
Dept: RADIOLOGY | Facility: MEDICAL CENTER | Age: 25
DRG: 835 | End: 2025-02-28
Attending: PEDIATRICS
Payer: COMMERCIAL

## 2025-02-28 VITALS
WEIGHT: 171.3 LBS | HEART RATE: 94 BPM | RESPIRATION RATE: 14 BRPM | DIASTOLIC BLOOD PRESSURE: 67 MMHG | HEIGHT: 65 IN | TEMPERATURE: 98.7 F | SYSTOLIC BLOOD PRESSURE: 112 MMHG | OXYGEN SATURATION: 97 % | BODY MASS INDEX: 28.54 KG/M2

## 2025-02-28 DIAGNOSIS — C91.Z0 B LYMPHOBLASTIC LEUKEMIA WITH T(9;22)(Q34;Q11.2);BCR-ABL1 (HCC): ICD-10-CM

## 2025-02-28 DIAGNOSIS — C91.01 ACUTE LYMPHOBLASTIC LEUKEMIA (ALL) IN REMISSION (HCC): ICD-10-CM

## 2025-02-28 LAB
ALBUMIN SERPL BCP-MCNC: 3.7 G/DL (ref 3.2–4.9)
ALBUMIN/GLOB SERPL: 1.8 G/DL
ALP SERPL-CCNC: 81 U/L (ref 30–99)
ALT SERPL-CCNC: 10 U/L (ref 2–50)
ANION GAP SERPL CALC-SCNC: 10 MMOL/L (ref 7–16)
ANISOCYTOSIS BLD QL SMEAR: ABNORMAL
AST SERPL-CCNC: 16 U/L (ref 12–45)
BACTERIA BLD CULT: NORMAL
BACTERIA BLD CULT: NORMAL
BARCODED ABORH UBTYP: 5100
BARCODED PRD CODE UBPRD: NORMAL
BARCODED UNIT NUM UBUNT: NORMAL
BASOPHILS # BLD AUTO: 0 % (ref 0–1.8)
BASOPHILS # BLD: 0 K/UL (ref 0–0.12)
BILIRUB SERPL-MCNC: 1 MG/DL (ref 0.1–1.5)
BUN SERPL-MCNC: 12 MG/DL (ref 8–22)
BURR CELLS/RBC NFR CSF MANUAL: 0 %
CALCIUM ALBUM COR SERPL-MCNC: 7.9 MG/DL (ref 8.5–10.5)
CALCIUM SERPL-MCNC: 7.7 MG/DL (ref 8.5–10.5)
CHLORIDE SERPL-SCNC: 103 MMOL/L (ref 96–112)
CLARITY CSF: CLEAR
CO2 SERPL-SCNC: 22 MMOL/L (ref 20–33)
COLOR CSF: COLORLESS
COLOR SPUN CSF: COLORLESS
COMMENT NL1176: ABNORMAL
COMPONENT P 8504P: NORMAL
CREAT SERPL-MCNC: 0.85 MG/DL (ref 0.5–1.4)
CSF COMMENTS 1658: NORMAL
CYTOLOGY REG CYTOL: NORMAL
EOSINOPHIL # BLD AUTO: 0 K/UL (ref 0–0.51)
EOSINOPHIL NFR BLD: 0 % (ref 0–6.9)
ERYTHROCYTE [DISTWIDTH] IN BLOOD BY AUTOMATED COUNT: 34.9 FL (ref 35.9–50)
GFR SERPLBLD CREATININE-BSD FMLA CKD-EPI: 125 ML/MIN/1.73 M 2
GLOBULIN SER CALC-MCNC: 2.1 G/DL (ref 1.9–3.5)
GLUCOSE BLD STRIP.AUTO-MCNC: 87 MG/DL (ref 65–99)
GLUCOSE SERPL-MCNC: 102 MG/DL (ref 65–99)
HCT VFR BLD AUTO: 24.2 % (ref 42–52)
HGB BLD-MCNC: 8.6 G/DL (ref 14–18)
LYMPHOCYTES # BLD AUTO: 0.78 K/UL (ref 1–4.8)
LYMPHOCYTES NFR BLD: 97.7 % (ref 22–41)
MANUAL DIFF BLD: ABNORMAL
MCH RBC QN AUTO: 28.7 PG (ref 27–33)
MCHC RBC AUTO-ENTMCNC: 35.5 G/DL (ref 32.3–36.5)
MCV RBC AUTO: 80.7 FL (ref 81.4–97.8)
MICROCYTES BLD QL SMEAR: ABNORMAL
MONOCYTES # BLD AUTO: 0.01 K/UL (ref 0–0.85)
MONOCYTES NFR BLD AUTO: 1.8 % (ref 0–13.4)
NEUTROPHILS # BLD AUTO: 0 K/UL (ref 1.82–7.42)
NEUTROPHILS NFR BLD: 0.5 % (ref 44–72)
NUC CELL # CSF: 1 CELLS/UL (ref 0–10)
PHOSPHATE SERPL-MCNC: 2.6 MG/DL (ref 2.5–4.5)
PLATELET # BLD AUTO: 35 K/UL (ref 164–446)
PLATELET BLD QL SMEAR: NORMAL
PLATELETS.RETICULATED NFR BLD AUTO: 3.2 % (ref 0.6–13.1)
PMV BLD AUTO: 10.7 FL (ref 9–12.9)
POTASSIUM SERPL-SCNC: 4.1 MMOL/L (ref 3.6–5.5)
PRODUCT TYPE UPROD: NORMAL
PROT SERPL-MCNC: 5.8 G/DL (ref 6–8.2)
RBC # BLD AUTO: 3 M/UL (ref 4.7–6.1)
RBC # CSF: 1 CELLS/UL
RBC BLD AUTO: PRESENT
SIGNIFICANT IND 70042: NORMAL
SIGNIFICANT IND 70042: NORMAL
SITE SITE: NORMAL
SITE SITE: NORMAL
SODIUM SERPL-SCNC: 135 MMOL/L (ref 135–145)
SOURCE SOURCE: NORMAL
SOURCE SOURCE: NORMAL
SPECIMEN VOL CSF: 1.5 ML
TUBE # CSF: 2
TUBE # CSF: NORMAL
UNIT STATUS USTAT: NORMAL
URATE SERPL-MCNC: 6.7 MG/DL (ref 2.5–8.3)
VARIANT LYMPHS BLD QL SMEAR: ABNORMAL
WBC # BLD AUTO: 0.8 K/UL (ref 4.8–10.8)

## 2025-02-28 PROCEDURE — 88237 TISSUE CULTURE BONE MARROW: CPT

## 2025-02-28 PROCEDURE — 71045 X-RAY EXAM CHEST 1 VIEW: CPT

## 2025-02-28 PROCEDURE — 009U3ZX DRAINAGE OF SPINAL CANAL, PERCUTANEOUS APPROACH, DIAGNOSTIC: ICD-10-PCS | Performed by: PEDIATRICS

## 2025-02-28 PROCEDURE — 82962 GLUCOSE BLOOD TEST: CPT

## 2025-02-28 PROCEDURE — 0JH63XZ INSERTION OF TUNNELED VASCULAR ACCESS DEVICE INTO CHEST SUBCUTANEOUS TISSUE AND FASCIA, PERCUTANEOUS APPROACH: ICD-10-PCS | Performed by: STUDENT IN AN ORGANIZED HEALTH CARE EDUCATION/TRAINING PROGRAM

## 2025-02-28 PROCEDURE — 88108 CYTOPATH CONCENTRATE TECH: CPT | Performed by: PATHOLOGY

## 2025-02-28 PROCEDURE — 88185 FLOWCYTOMETRY/TC ADD-ON: CPT | Mod: 91

## 2025-02-28 PROCEDURE — 700111 HCHG RX REV CODE 636 W/ 250 OVERRIDE (IP): Performed by: STUDENT IN AN ORGANIZED HEALTH CARE EDUCATION/TRAINING PROGRAM

## 2025-02-28 PROCEDURE — 07DR3ZX EXTRACTION OF ILIAC BONE MARROW, PERCUTANEOUS APPROACH, DIAGNOSTIC: ICD-10-PCS | Performed by: PEDIATRICS

## 2025-02-28 PROCEDURE — 700105 HCHG RX REV CODE 258: Performed by: PEDIATRICS

## 2025-02-28 PROCEDURE — 88305 TISSUE EXAM BY PATHOLOGIST: CPT | Performed by: PATHOLOGY

## 2025-02-28 PROCEDURE — 700111 HCHG RX REV CODE 636 W/ 250 OVERRIDE (IP): Mod: JZ | Performed by: PEDIATRICS

## 2025-02-28 PROCEDURE — 80053 COMPREHEN METABOLIC PANEL: CPT

## 2025-02-28 PROCEDURE — 38220 DX BONE MARROW ASPIRATIONS: CPT | Performed by: PEDIATRICS

## 2025-02-28 PROCEDURE — 700111 HCHG RX REV CODE 636 W/ 250 OVERRIDE (IP): Mod: JZ

## 2025-02-28 PROCEDURE — 84100 ASSAY OF PHOSPHORUS: CPT

## 2025-02-28 PROCEDURE — 99152 MOD SED SAME PHYS/QHP 5/>YRS: CPT

## 2025-02-28 PROCEDURE — 88271 CYTOGENETICS DNA PROBE: CPT | Mod: 91

## 2025-02-28 PROCEDURE — 36415 COLL VENOUS BLD VENIPUNCTURE: CPT

## 2025-02-28 PROCEDURE — 88184 FLOWCYTOMETRY/ TC 1 MARKER: CPT

## 2025-02-28 PROCEDURE — 88108 CYTOPATH CONCENTRATE TECH: CPT | Mod: 26 | Performed by: PATHOLOGY

## 2025-02-28 PROCEDURE — 87529 HSV DNA AMP PROBE: CPT

## 2025-02-28 PROCEDURE — 99153 MOD SED SAME PHYS/QHP EA: CPT

## 2025-02-28 PROCEDURE — 88275 CYTOGENETICS 100-300: CPT | Mod: 91

## 2025-02-28 PROCEDURE — 85060 BLOOD SMEAR INTERPRETATION: CPT | Performed by: PATHOLOGY

## 2025-02-28 PROCEDURE — P9034 PLATELETS, PHERESIS: HCPCS

## 2025-02-28 PROCEDURE — 85007 BL SMEAR W/DIFF WBC COUNT: CPT

## 2025-02-28 PROCEDURE — 89051 BODY FLUID CELL COUNT: CPT

## 2025-02-28 PROCEDURE — 85055 RETICULATED PLATELET ASSAY: CPT

## 2025-02-28 PROCEDURE — 770004 HCHG ROOM/CARE - ONCOLOGY PRIVATE *

## 2025-02-28 PROCEDURE — 84550 ASSAY OF BLOOD/URIC ACID: CPT

## 2025-02-28 PROCEDURE — 86945 BLOOD PRODUCT/IRRADIATION: CPT

## 2025-02-28 PROCEDURE — 02HV33Z INSERTION OF INFUSION DEVICE INTO SUPERIOR VENA CAVA, PERCUTANEOUS APPROACH: ICD-10-PCS | Performed by: STUDENT IN AN ORGANIZED HEALTH CARE EDUCATION/TRAINING PROGRAM

## 2025-02-28 PROCEDURE — 36430 TRANSFUSION BLD/BLD COMPNT: CPT

## 2025-02-28 PROCEDURE — 99233 SBSQ HOSP IP/OBS HIGH 50: CPT | Mod: 25 | Performed by: PEDIATRICS

## 2025-02-28 PROCEDURE — 88264 CHROMOSOME ANALYSIS 20-25: CPT

## 2025-02-28 PROCEDURE — 88305 TISSUE EXAM BY PATHOLOGIST: CPT | Mod: 26 | Performed by: PATHOLOGY

## 2025-02-28 PROCEDURE — 62270 DX LMBR SPI PNXR: CPT | Performed by: PEDIATRICS

## 2025-02-28 PROCEDURE — A9270 NON-COVERED ITEM OR SERVICE: HCPCS | Performed by: PEDIATRICS

## 2025-02-28 PROCEDURE — 85027 COMPLETE CBC AUTOMATED: CPT

## 2025-02-28 PROCEDURE — 700102 HCHG RX REV CODE 250 W/ 637 OVERRIDE(OP): Performed by: PEDIATRICS

## 2025-02-28 RX ORDER — MIDAZOLAM HYDROCHLORIDE 1 MG/ML
1-5 INJECTION INTRAMUSCULAR; INTRAVENOUS ONCE
Status: COMPLETED | OUTPATIENT
Start: 2025-02-28 | End: 2025-02-28

## 2025-02-28 RX ORDER — ALLOPURINOL 100 MG/1
200 TABLET ORAL 3 TIMES DAILY
Status: DISCONTINUED | OUTPATIENT
Start: 2025-02-28 | End: 2025-03-09 | Stop reason: HOSPADM

## 2025-02-28 RX ORDER — PROPOFOL 10 MG/ML
200 INJECTION, EMULSION INTRAVENOUS ONCE
Status: COMPLETED | OUTPATIENT
Start: 2025-02-28 | End: 2025-02-28

## 2025-02-28 RX ADMIN — FENTANYL CITRATE 50 MCG: 50 INJECTION, SOLUTION INTRAMUSCULAR; INTRAVENOUS at 14:44

## 2025-02-28 RX ADMIN — ACETAMINOPHEN 650 MG: 325 TABLET ORAL at 10:34

## 2025-02-28 RX ADMIN — ACETAMINOPHEN 650 MG: 325 TABLET ORAL at 19:39

## 2025-02-28 RX ADMIN — PROPOFOL 200 MG: 10 INJECTION, EMULSION INTRAVENOUS at 14:41

## 2025-02-28 RX ADMIN — FENTANYL CITRATE 50 MCG: 50 INJECTION, SOLUTION INTRAMUSCULAR; INTRAVENOUS at 14:56

## 2025-02-28 RX ADMIN — ALLOPURINOL 200 MG: 100 TABLET ORAL at 17:56

## 2025-02-28 RX ADMIN — MUPIROCIN 1 DOSE: 20 OINTMENT TOPICAL at 11:17

## 2025-02-28 RX ADMIN — SODIUM CHLORIDE: 9 INJECTION, SOLUTION INTRAVENOUS at 20:36

## 2025-02-28 RX ADMIN — MUPIROCIN 2 %: 20 OINTMENT TOPICAL at 05:36

## 2025-02-28 RX ADMIN — OXYCODONE HYDROCHLORIDE 5 MG: 5 TABLET ORAL at 17:57

## 2025-02-28 RX ADMIN — FENTANYL CITRATE 50 MCG: 50 INJECTION, SOLUTION INTRAMUSCULAR; INTRAVENOUS at 13:51

## 2025-02-28 RX ADMIN — ALLOPURINOL 200 MG: 100 TABLET ORAL at 11:18

## 2025-02-28 RX ADMIN — SODIUM CHLORIDE: 9 INJECTION, SOLUTION INTRAVENOUS at 05:33

## 2025-02-28 RX ADMIN — OXYCODONE HYDROCHLORIDE 5 MG: 5 TABLET ORAL at 04:31

## 2025-02-28 RX ADMIN — CEFEPIME 2 G: 2 INJECTION, POWDER, FOR SOLUTION INTRAVENOUS at 22:03

## 2025-02-28 RX ADMIN — MIDAZOLAM HYDROCHLORIDE 5 MG: 1 INJECTION, SOLUTION INTRAMUSCULAR; INTRAVENOUS at 15:26

## 2025-02-28 RX ADMIN — FENTANYL CITRATE 50 MCG: 50 INJECTION, SOLUTION INTRAMUSCULAR; INTRAVENOUS at 14:15

## 2025-02-28 RX ADMIN — CEFEPIME 2 G: 2 INJECTION, POWDER, FOR SOLUTION INTRAVENOUS at 05:36

## 2025-02-28 RX ADMIN — MUPIROCIN 1 DOSE: 20 OINTMENT TOPICAL at 17:56

## 2025-02-28 ASSESSMENT — PAIN DESCRIPTION - PAIN TYPE
TYPE: ACUTE PAIN
TYPE: ACUTE PAIN;SURGICAL PAIN
TYPE: ACUTE PAIN
TYPE: ACUTE PAIN
TYPE: OTHER (COMMENT)
TYPE: ACUTE PAIN

## 2025-02-28 NOTE — PROGRESS NOTES
[address] [place] [id number]       Tomas Jean-Baptiste   MRN: [id number], : 2000 , Sex: M   Admit: 2025, D/C:              Odette Hopper R.N.  Registered Nurse       Progress Notes     Signed     Date of Service: 2025  1:02 PM                   Show:  [x]Written[]Templated[]Copied    Added by:  [x]Odette Hopper R.N.      []Rajwinder for details    MD Baumgarten @ bedside for central line placement, MD Faye at bedside as well for bone marrow biopsy. Time out performed, all allergies verified. Consent obtained. Rapid team at bedside for conscious sedation.      1320- Procedure started.   1321- 20 mg Propofol administered.  1324- 10 mg of Propofol administered. BP 97/52  1325- 20 mg of Propofol administered.  1326- BP 96/53   1327- BP 96/50 RR 13  1330- BP 98/50  1332- 10 mg Propofol administered. /51. Guide wire removed  1338- 10 mg Propofol administered.   1340- 10 mg Propofol administered. /51  1343- BP 98/53   Left subclavian central line placement unsuccessful, MD attempting right side.   1344- /51  1345- 20 mg Propofol administered.   1348- 20 mg Propofol administered. /57  1351- 50 mcg Fentanyl administered. /56  1352- 10 mg Propofol administered.  1354- BP 98/48  1357- /53  1400- /54 RR 17 EtCO 31  1403- /59  1405- /59  1408- Guide wire removed. Reattempting left subclavian again. 20 mg Propofol administered.   1410- /63.  1411- 50 mcg Fentanyl administered.  1412- /57  1413- 10 mg of Propofol administered.   1415- 20 mg of Propofol administered. 50 mcg of Fentanyl administered. /50  1417- /58  1421- BP 98/55.         1425- 10 mg Propofol administered. /49.   1428- BP 98/54.   1430- /57  1432- BP 94/68  1441- 10 mg Propofol administered. /58  1444- 50 mcg Fentanyl administered. 1 mg of Versed administered. /64.  1448- /57  1449- 1 mg of Versed  administered.  1451- /52 RR 17 EtCO 32  1455- /51  1456- 50 mcg Fentanyl administered. 1 mg Versed administered. /56  1459- Guide wire removed. Left subclavian central line placement successful.   1502- Suture started.   1506- /61.   1508- Site dressed, Procedure complete.    1510- Bone Marrow biopsy started by MD Faye   1518- 1 mg Versed administered.  1526 1mg versed  1540 procedure complete

## 2025-02-28 NOTE — DISCHARGE PLANNING
Care Transition Team Assessment    Patient is a 3 year old male admitted for fever and neutropenia. Please see patient's H&P for prior medical history. RNCM met with patient at bedside to complete assessment. Patient is A/Ox4 and able to verify information on the face sheet. Patient currently lives with his girlfriend in a single story house with no steps to enter, Dougie Glass (p)[phone]; emergency contact. No Advanced directive on file. Patient reports, prior to admission patient is independent with ADL's and IADL's. Patient does not use any DME at baseline. Patient reports his girlfriend is good support for him. Patient currently works full time. Patient's PCP is Margarito Arvizu. Patient's preferred pharmacy is [facility] on [address]. Patient denies a history of SNF/IPR nor HHC use in the past. Patient denies any SA or MH concerns. Patient confirmed medical coverage via [employer] and Medicare. Patient has means to transportation and his girlfriend will provide transport once medically stable for discharge. Patient is being followed in Peds Oncology.     Patient will need a work note upon discharge.    Information Source  Orientation Level: Oriented X4  Information Given By: Patient  Who is responsible for making decisions for patient? : Patient    Readmission Evaluation  Is this a readmission?: No    Elopement Risk  Legal Hold: No  Ambulatory or Self Mobile in Wheelchair: Yes  Disoriented: No  Psychiatric Symptoms: None  History of Wandering: No  Elopement this Admit: No  Vocalizing Wanting to Leave: No  Displays Behaviors, Body Language Wanting to Leave: No-Not at Risk for Elopement  Elopement Risk: Not at Risk for Elopement    Interdisciplinary Discharge Planning  Lives with - Patient's Self Care Capacity: Significant Other  Patient or legal guardian wants to designate a caregiver: No  Support Systems: Family Member(s), Parent, Spouse / Significant Other, Friends / Neighbors  Housing / Facility: 1 Story  House    Discharge Preparedness  What is your plan after discharge?: Home with help  What are your discharge supports?: Parent, Other (comment)  Prior Functional Level: Ambulatory, Independent with Activities of Daily Living, Independent with Medication Management  Difficulity with ADLs: None  Difficulity with IADLs: None    Functional Assesment  Prior Functional Level: Ambulatory, Independent with Activities of Daily Living, Independent with Medication Management    Finances  Financial Barriers to Discharge: No  Prescription Coverage: Yes    Vision / Hearing Impairment  Vision Impairment : Yes  Right Eye Vision: Wears Glasses  Left Eye Vision: Wears Glasses  Hearing Impairment : No         Advance Directive  Advance Directive?: None  Advance Directive offered?: AD Booklet refused    Domestic Abuse  Possible Abuse/Neglect Reported to:: Not Applicable    Psychological Assessment  History of Substance Abuse: None  History of Psychiatric Problems: No  Non-compliant with Treatment: No  Newly Diagnosed Illness: No    Discharge Risks or Barriers  Discharge risks or barriers?: No  Patient risk factors: Complex medical needs    Anticipated Discharge Information  Discharge Disposition: Discharged to home/self care (01)

## 2025-02-28 NOTE — CARE PLAN
The patient is Watcher - Medium risk of patient condition declining or worsening    Shift Goals  Clinical Goals: Pain control, remain afebrile  Patient Goals: Sleep comfortably throughout the night  Family Goals: Not present    Progress made toward(s) clinical / shift goals:        Problem: Knowledge Deficit - Standard  Goal: Patient and family/care givers will demonstrate understanding of plan of care, disease process/condition, diagnostic tests and medications  Outcome: Progressing     Problem: Pain - Standard  Goal: Alleviation of pain or a reduction in pain to the patient’s comfort goal  Outcome: Progressing

## 2025-02-28 NOTE — PROGRESS NOTES
MD Baumgarten @ bedside for central line placement, MD Faye at bedside as well for bone marrow biopsy. Time out performed, all allergies verified. Consent obtained. Rapid team at bedside for conscious sedation.     1320- Procedure started.   1321- 20 mg Propofol administered.  1324- 10 mg of Propofol administered. BP 97/52  1325- 20 mg of Propofol administered.  1326- BP 96/53   1327- BP 96/50 RR 13  1330- BP 98/50  1332- 10 mg Propofol administered. /51. Guide wire removed  1338- 10 mg Propofol administered.   1340- 10 mg Propofol administered. /51  1343- BP 98/53   Left subclavian central line placement unsuccessful, MD attempting right side.   1344- /51  1345- 20 mg Propofol administered.   1348- 20 mg Propofol administered. /57  1351- 50 mcg Fentanyl administered. /56  1352- 10 mg Propofol administered.  1354- BP 98/48  1357- /53  1400- /54 RR 17 EtCO 31  1403- /59  1405- /59  1408- Guide wire removed. Reattempting left subclavian again. 20 mg Propofol administered.   1410- /63.  1411- 50 mcg Fentanyl administered.  1412- /57  1413- 10 mg of Propofol administered.   1415- 20 mg of Propofol administered. 50 mcg of Fentanyl administered. /50  1417- /58  1421- BP 98/55.

## 2025-02-28 NOTE — PROGRESS NOTES
4 Eyes Skin Assessment Completed by MONTSERRAT Barfield and MONTSERRAT Glass.    Head WDL  Ears WDL  Nose WDL  Mouth WDL  Neck WDL  Breast/Chest WDL  Shoulder Blades WDL  Spine WDL  (R) Arm/Elbow/Hand Pulled hang nail on 4th finger, multiple small lesions on forearm  (L) Arm/Elbow/Hand WDL  Abdomen WDL  Groin WDL  Scrotum/Coccyx/Buttocks WDL  (R) Leg WDL  (L) Leg WDL  (R) Heel/Foot/Toe WDL  (L) Heel/Foot/Toe WDL          Devices In Places N/A      Interventions In Place Pillows    Possible Skin Injury No    Pictures Uploaded Into Epic N/A  Wound Consult Placed N/A  RN Wound Prevention Protocol Ordered No

## 2025-02-28 NOTE — CARE PLAN
The patient is Watcher - Medium risk of patient condition declining or worsening    Shift Goals  Clinical Goals: Pain control, remain afebrile  Patient Goals: Sleep comfortably throughout the night  Family Goals: Not present    Progress made toward(s) clinical / shift goals:    Problem: Knowledge Deficit - Standard  Goal: Patient and family/care givers will demonstrate understanding of plan of care, disease process/condition, diagnostic tests and medications  Outcome: Progressing- Patient aware of NPO status and biopsy today, verbalized understanding     Problem: Pain - Standard  Goal: Alleviation of pain or a reduction in pain to the patient’s comfort goal  Outcome: Progressing- Patient was in a lot of pain on admission. After medicated, verbalized pain level decrease and resting comfortably throughout the night

## 2025-02-28 NOTE — PROCEDURES
Placement of tunneled central catheter:    The patient was consented and time out performed. The patient was positioned supine on an XR board with shoulder roll. The chest and neck were prepped and draped in the usual sterile fashion. The patient was sedated with propofol. The skin was injected with 1% lidocaine with epi. The 18 gauge introducer needle was used to access the left subclavian vein. The wire was advanced into the vessel and went easily. An incision was made around the wire and also at the exit site for the    and sheath into the vessel and then I did remove the dilator and wire leaving the sheath and advancing the catheter into the vein as the sheath was removed. The catheter was in good position. The catheter was sewn in place with silk suture at the insertion site and the other three access sites were closed with one interrupted silk stitch. The dressing was applied over the line in normal sterile fashion with a blue dot in place. The catheter flushed and michael back through both lumens without difficulty. The patient tolerated the procedure well. Additional sedation was given throughout the procedure, and due to the long duration of the procedure a total of 200mg propofol, 5mg versed, and 200mcg fentanyl were used. The patient awoke from sedation without issue.

## 2025-02-28 NOTE — H&P
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 dose adjustments were necessary however his counts had started to drop at that point with WBC 0.9 and ANC dipping to 590.  On 7/27/2023, present Tomas Roy to clinic with nausea, vomiting, abdominal discomfort as well as decreased fatigue.  Blood counts obtained at the time demonstrated a WBC of 0.4, Hgb 8.8 and platelets 125.  ANC at the time was measured at only 170.  JULY was otherwise clinically well appearing.  He did receive a one-time normal saline bolus for his nausea and vomiting and was sent home with instructions to HOLD his oral mercaptopurine and oral methotrexate which began being held on 7/28/2023.  Per protocol, imatinib was continued at previous dose of 600 mg in the morning. Tomas Roy would return to clinic again on 8/2/2023 and 8/7/2023.  On both occasions, ANC remained under 500 and oral therapy (with the exception of imatinib) continue to be held while awaiting count  recovery.  Ultimately, on 8/11/2023 after 14 days of therapy being held, Tomas Roy returned to clinic and WBC had increased to 2.1 with ANC increasing to 1500 with an absolute monocyte count of 290. Tomas Roy was then instructed to resume his oral chemotherapy at 100% of prior dosing with (due to timing with Day 1 of Cycle 2 with LP 3 days later, no weekly MTX was administered).  Imatinib was never held.  On 8/14/2023 he was seen in clinic for Day 1 of Cycle 2 of Maintenance with lumbar puncture, vincristine, and 5-day pulse of steroids.  At the time, his ANC was 2010 and his oral chemotherapy was continued at 100% dosing.  Given his history of previously drop in counts, he was scheduled to come back for repeat labs on 8/29/2023 at which point his WBC count was 1.4 and his ANC remained greater than 500 at 1140.  Again, his therapy was continued with imatinib 550 mg by mouth in the morning as well as 100% dosing of methotrexate and 100% dosing of 6-mercaptopurine.  When Tomas Roy returned to clinic on 9/11/2023 for his Maintenance, Cycle 2, Day 29 therapy he was noted to have had another drop in his WBC to 600 and his ANC accordingly was also decreased at 410.  He did receive vincristine in accordance with protocol as well as starting a 5-day pulse of prednisone per protocol.  Given however that his ANC had dropped below 500 his oral methotrexate and oral 6-MP were again held.  He was instructed return to clinic 1 week later for lab evaluations.  On 9/19/2023 he returned to clinic and WBC had improved slightly to 0.8 however ANC remained low at 260 with an absolute monocyte count of 220.  Given that counts not recovered his oral chemotherapy remained held for an additional week.  On 9/26/2023 at 14 days after initially holding chemotherapy, he was seen back in clinic at which time WBC improved again to 1.1 however ANC did not quite recover and remained at 490 with an absolute monocyte count of  330.  Given that 2 weeks had elapsed with myelosuppression, imatinib was held in addition to oral 6-MP and methotrexate. Tomas Roy was instructed to return to clinic on 9/29/2023 for repeat counts.  On 9/29/2023 WBC had improved to 2.4 and ANC had finally recovered with 1530 and an absolute monocyte count of 510.  Given a recovery with ANC greater than 750 and platelets greater than 75, oral chemotherapy was restarted at 50% of initial dosing and imatinib was restarted at 100% of initial dosing.  On 10/9/2023, Tomas Roy returned to clinic for his Day 57 of Cycle 2 visit.  At the time of his visit, his ANC had recovered after holding chemotherapy and then restarting at 50% dosing 11 days prior.  He did however in clinic spiked a temperature 102.5 °F.  For this reason despite his ANC being improved, no dose adjustments were made in his chemotherapy.  He continued with 50% dosing with 100% adherence of his 6-MP and methotrexate as well as his imatinib at 550 mg PO QHS.   Tomas Roy was then seen in clinic again on 11/6/2023 for his Day 1 of Cycle 3 of Maintenance therapy.  At the time, his imatinib dose was adjusted back up to 600 mg daily taken in the morning.  Additionally, his methotrexate was adjusted back up to 75% of expected dosing and his mercaptopurine was increased to 75% of expected dosing as well.  He will return to clinic on 12/4/2023 for his Day 29 of Cycle 3 therapy.  At the time, his ANC was exactly 1500 and therefore no dose adjustments were made and he continued at 75% dosing for oral chemotherapy as well as the imatinib dose of 600 mg daily.  On 1/2/2024 he was seen for his Day 57 evaluations and his ANC had dropped to 1450 again not prompting any change in oral chemotherapy dosing.  Tomas Roy completed his Cycle 4 chemotherapy without any adverse events or complications.  He did not have any changes in medications either.  Most recently,  Tomas Roy was seen in  "clinic on 4/22/2023 for his Day 1 of Cycle 5 chemotherapy.  At the time, a lumbar puncture was performed and IT chemotherapy was provided.  He tolerated the procedure and the therapy well without any sequelae.  His ANC at the time was > 1500 however the dose adjustment was made as this was the first ANC greater than 1500 and Tomas Roy had a history of precipitous drops in his counts with increases in his oral chemotherapy.  On 5/20/2024, Tomas Roy presented to clinic for his Cycle 5, Day 29 therapy and evaluations. At that time, he was started on 5 day pulse of prednisone and his oral methotrexate dose was increased to 13 tablets PO weekly (90.78% of expected dosing). His port was removed on 5/20/2024 by Dr. Moise. He completed therapy on 5/30/2024.  Since his completion of therapy and poor removal, he has been seen in follow-up and was most recently seen on 1/15/2025 at which point there was no evidence of relapse or recurrence both clinically or in his labs.  Interval history however is remarkable for upper respiratory symptoms approximately 1-1/2 weeks ago.  At the time, he reported having some nausea and vomiting as well as an upset stomach and sore throat.  The symptoms were thought most likely to be viral and in fact improved consistent with viral illness.  On about 2/24/2025 however symptoms returned and were more flulike in nature with aches and pains and low-grade temperatures.  On the evening of 2/26/2025, JULY called Pediatric Hematology/Oncology to report that he \"feels like I am relapsing\". Tomas Roy was brought in to clinic today for evaluations.  In clinic, CBC, CMP, respiratory viral panel and EBV studies were obtained.  Respiratory viral studies were negative.  CBC however demonstrated a white blood cell count of 1, hemoglobin 10.6, platelets of 53 without any circulating blasts.  His CMP for the most part was normal with mild hyponatremia.  Uric acid was 7.2 and an LDH of 244.  " Given these lab values as well as a temperature of 100.6 while in clinic also in the context of an acute infection of his right fourth phalanx, decision was made to bring JULY back to the hospital for admission for fever and neutropenia as well as workup of presumed relapsed disease.    On presentation, JULY is doing surprisingly well.  Although his energy overall is down, he is not overly fatigued.  He does not report any significant headaches, changes in vision or neurologic status changes.  He does complain of some mild back pain.  He has complained of right arm pain which is currently not a problem.  Has had mild nausea however it has been exceptionally mild and no vomiting in the past week.  Eating and drinking okay.  No complaints of any abdominal discomfort or pain.  Stooling and voiding normally.  No complaints of any easy bruising or bleeding at this point.  Does have newly developed rash on right forearm (dorsal aspect) as well as paronychia of the fourth distal phalanx which is painful.  Not currently taking any medications.  No other concerns or complaints at this time.     Review of Systems:     Constitutional: Febrile in clinic to 100.6 °F.  Energy overall decreased.  Eating and drinking normally.  HENT: Negative for auditory changes, nosebleeds and sore throat.  No mouth sores.  Eyes: Negative for visual changes.  Respiratory: Negative for  shortness of breath.  Cardiovascular: Negative.  Gastrointestinal: Negative vomiting, abdominal pain, diarrhea, constipation.  Mild nausea.  Genitourinary: Negative.  Musculoskeletal: Has been having aches and pains with right elbow and arm primarily, now with mild back pain.    Skin: Furuncle appearing rash on mid dorsal forearm, paronychia with significant swelling and erythema of the distal fourth phalanx.  Neurological: Negative for numbness, tingling, sensory changes, weakness or headaches.    Endo/Heme/Allergies: Does not bruise/bleed easily.     Psychiatric/Behavioral: Surprisingly good mood despite circumstances.    PAST MEDICAL HISTORY:     Oncologic History:  2-3 week history of worsening fatigue, right lower extremity pain  Presentation to OS and diagnosed with right LE superficial thrombus, subsegmental PE and hyperleukocytosis, anemia and thrombocytopenia  Transferred to [facility] for definitive care  Presenting (local) WBC > 440K, Hgb 10.0, platelets 53, (automated differential ANC 3190, ALC 75,310, absolute monocyte count 68576, absolute blast count 340,560)  Uric Acid 15.6, phosphorus 5.6, LDH 1114  Rasburicase x 1 dose given   Peripheral Blood flow cytometry demonstrating CD10 pos, CD19 pos, CD20 neg, CD22 dim (60%) 5/28/2022  Peripheral blood FISH for BCR-ABL1 positive in 98% of analyzed cells     Age at Diagnosis: 21 years  White Blood Cell Count at Presentation: > 440 k/uL  Testicular Disease Status: Negative (see procedure note 5/30/2022)  CNS Status: 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, diagnostic LP with WBC 1, RBC 3 and no evidence of leukemic blasts 5/30/2022  Steroid Pre-treatment: None  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     All inclusion/exclusion criteria for [id number] met and consent signed - enrolled 5/29/2022   All inclusion/exclusion criteria for [id number] met and consent signed - enrolled 5/30/2022  Confirmatory bone marrow aspirate and biopsy and diagnostic LP + cytarabine 70 mg IT 5/30/2022  Induction therapy (ON STUDY [id number]) started 5/30/2022  Bone marrow immunohistochemistry consistent with diagnosis of B-ALL comprising 90% of marrow cellularity  B[facility] to [facility] for COG purposes:  Flow cytom  Of the blood pressure little high that is a problem is a cultural problem is well and cultural genetic and everything else like that unfortunately breathalyzers such bad heart disease diabetes things like that   populations etry consistent with peripheral blood, cytogenetics remarkable for known t(9;22)  CSF with WBC 1, RBC 3, no blasts identified on cytospin  FISH results available 5/31/2022 making patient eligible for transfer from [place] [address] to [place] [address] as eligibility requirements were met for [place] [address]  Patient unenrolled from [place] [address] (BCR-ABL1 fusion positive) 6/1/2022  Consent signed for [place] [address] and patient enrolled 6/1/2022 (see eligibility checklist from 5/31/2022 and consent note from 6/1/2022)  Imatinib 400 mg PO QAM / 200 mg PO QPM started 6/3/2022 (allowed per [place] [address])  Patient completed the first 15 days of a Standard 4-drug Induction on 6/13/2022  Start of [place] [address](OS), Induction IA Part 2, Day 15 6/13/2022  End of Induction 1A Part 2 - MRD negative  Start of [place] [address](OS), Induction IA Part 2, Day 15 7/5/2022  Induction IB DELAYED 2 weeks 14 days from 7/26/2022-8/9/2022) for myelosuppression - Start of Day 22 cytarabine block 8/9/2022  Induction IB Day 42 delayed 9 days for myelosuppression - Day 42 evaluations 9/7/2022  End of Induction IB - Flow cytometric MRD negative, MRD by IgH-TCR PCR [id number]%  Randomization to AR-Experimental Arm B of [place] [address]  Start of AR-Experimental Arm B, Interim Maintenance 9/29/2022  Weight based increase in dose of imatinib to 400 mg PO AM and 250 mg PM 9/29/2022  Thrombocytopenia not permissive of proceeding with Day 15 of Interim Maintenance  AR-Experimental Arm B, Interim Maintenance, Day 15 delayed 4 days, start 10/17/2022  AR-Experimental Arm B, Interim Maintenance, Day 29, start 11/1/2022  AR-Experimental Arm B, Interim Maintenance, Day 43, start 11/14/2022  Last does of 6-MP 11/27/2022  AR-Experimental Arm B, Delayed Intensification, Day 1, start 12/6/2022  Admission with Severe Septic Shock 12/27/2022  Imatinib HELD 12/27/2022-1/8/2023  AR-Experimental Arm B, Delayed Intensification, Day 29 DELAYED 14 days with start 1/17/2023  Hospitalization 2/7/2023 on Day  "50 of Delayed Intensification with left shoulder pain ultimately diagnosed with Bacteroides fragilis infection  AR-Experimental Arm B, Interim Maintenance, Day 1 DELAYED 7 days with start 2/27/2023  AR-Experimental Arm B, Interim Maintenance, Day 11 DELAYED 4 days for platelets 43K on 3/9/2023  AR-Experimental Arm B, Interim Maintenance, Day 11 VCR given and MTX omitted for platelets 43K 3/13/2023  Imatinib HELD 3/30/2023-4/11/2023  AR-Experimental Arm B, Interim Maintenance, Day 21 (DELAYED 22 DAYS) - administered 4/11/2023 with methotrexate 80 mg/m² IV  AR-Experimental Arm B, Interim Maintenance, Day 31 (\"true\" Day 31 4/25/2023) - VCR and IT MTX given 4/28/2023 - IV MTX omitted  AR-Experimental Arm B, Interim Maintenance, Day 41 met with counts - administered 5/5/2023 with methotrexate 80 mg/m² IV     Start of Maintenance, Cycle 1, Day 1 -5/22/2023  Cranial radiation 6/5/2023-6/16/2023 (10 fractions)  Maintenance, Cycle 1, Day 29 - 6/23/2023 (no IT MTX given)  Maintenance, Cycle 1, Day 67, presented with fatigue nausea and vomiting found to have ANC less than 500  Methotrexate (oral) and mercaptopurine held 7/27/2023 - continued with imatinib  Methotrexate (oral) and mercaptopurine held 8/2/2023 - continued with imatinib  Methotrexate (oral) and mercaptopurine held 8/7/2023 - continued with imatinib  Restarted methotrexate (oral) and mercaptopurine at 100% previous dosing on 8/11/2023 - continued with imatinib  Maintenance, Cycle 2, Day 1 - 8/14/2023  Maintenance, Cycle 2, Day 29 - 9/11/2023  Methotrexate (oral) and mercaptopurine held 9/11/2023 - continued with imatinib  Methotrexate (oral) and mercaptopurine held 9/19/2023 - continued with imatinib  Methotrexate (oral) and mercaptopurine held 9/26/2023 - HELD imatinib  Methotrexate (oral) restarted at 50% dosing and mercaptopurine restarted at 50% dosing 9/29/2023 - RESTARTED imatinib  Maintenance, Cycle 2, Day 57 - 10/9/2023  Maintenance, Cycle 3, Day 1 - " 11/6/2023: Methotrexate (oral) increased to 75% dosing and mercaptopurine increased to 75% dosing.  Imatinib increased to 600 mg QAM 11/6/2023  Maintenance Cycle 3, Day 29: 12/4/2023 No changes made to the dosing of oral chemotherapy  Maintenance, Cycle 4, Day 1: No dose adjustments made however ANC slightly greater than >1500.  Oral chemotherapy did not need weight adjustment.  Maintenance, Cycle 5, Day 1 - 4/20/2024  Maintenance, Cycle 5, Day 29 - 5/20/2024  Port removal: 5/20/2024  Last day of therapy: 5/30/2024    RELAPSED DISEASE 2/27/2025     Past Medical History:    1) Previously Healthy  2) 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  3) Hyperleukocytosis  4) Hyperuricemia  5) Hyperphosphatemia  6) Right Lower Extremity Superficial Thrombus  7) Subsegmental Pulmonary Embolism  8) 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  9) Bacteroides fragilis soft tissue infection left shoulder  10) Anxiety/Depression     Past Surgical History:     1) Temporary Right IJ Pharesis Catheter Placement 5/28/2022  2) Right-sided Port-A-Cath placement 8/29/2022  3) IR drainage left calf hematoma  4) Left shoulder I&D  5) Cranial XRT 6/5/2023-6/16/2023     Birth/Developmental History:   1st of three children  Unremarkable pregnancy  Unremarkable delivery     Family History:  No significant family history of cancer.  Both maternal and paternal family history of diabetes.     Immunizations:  UTD     Social History:   Lives with girlfriend.  Graduated from college.  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  Social situation with family is fractured.    Medications:   No current facility-administered medications on file prior to encounter.     Current Outpatient Medications on File Prior to Encounter   Medication Sig Dispense Refill    cephALEXin (KEFLEX) 500 MG Cap Take 1 Capsule by mouth 3 times a day for 7 days. 21 Capsule 0    mupirocin calcium (BACTROBAN) 2 % Cream Apply 1 Application topically 2 times a day. 15 g 0     hydrocortisone 1 % Cream Apply 1 Application topically 2 times a day. (Patient not taking: Reported on 2/27/2025) 28 g 2       OBJECTIVE:     Vitals:   BP (!) 155/84   Pulse (!) 135   Temp 37.3 °C (99.1 °F) (Oral)   Resp 18   SpO2 94%     Labs:     Latest Reference Range & Units 02/27/25 14:55   WBC 4.8 - 10.8 K/uL 1.0 (LL)   RBC 4.70 - 6.10 M/uL 3.78 (L)   Hemoglobin 14.0 - 18.0 g/dL 10.6 (L)   Hematocrit 42.0 - 52.0 % 30.0 (L)   MCV 81.4 - 97.8 fL 79.4 (L)   MCH 27.0 - 33.0 pg 28.0   MCHC 32.3 - 36.5 g/dL 35.3   RDW 35.9 - 50.0 fL 33.6 (L)   Platelet Count 164 - 446 K/uL 53 (L)   MPV 9.0 - 12.9 fL 11.3   Neutrophils-Polys 44.00 - 72.00 % 0.90 (L)   Neutrophils (Absolute) 1.82 - 7.42 K/uL 0.01 (LL)   Lymphocytes 22.00 - 41.00 % 97.40 (H)   Lymphs (Absolute) 1.00 - 4.80 K/uL 0.97 (L)   Monocytes 0.00 - 13.40 % 1.70   Monos (Absolute) 0.00 - 0.85 K/uL 0.02   Eosinophils 0.00 - 6.90 % 0.00   Eos (Absolute) 0.00 - 0.51 K/uL 0.00   Basophils 0.00 - 1.80 % 0.00   Baso (Absolute) 0.00 - 0.12 K/uL 0.00   Nucleated RBC 0.00 - 0.20 /100 WBC 0.00   NRBC (Absolute) K/uL 0.00   Plt Estimation  Decreased   Imm. Plt Fraction 0.6 - 13.1 % 5.6   RBC Morphology  Present   Anisocytosis  1+   Microcytosis  2+ !   Poikilocytosis  1+   Ovalocytes  1+   Stomatocytes  1+   Reactive Lymphocytes  Few   Peripheral Smear Review  see below   Manual Diff Status  PERFORMED   Sodium 135 - 145 mmol/L 132 (L)   Potassium 3.6 - 5.5 mmol/L 3.9   Chloride 96 - 112 mmol/L 98   Co2 20 - 33 mmol/L 23   Anion Gap 7.0 - 16.0  11.0   Glucose 65 - 99 mg/dL 112 (H)   Bun 8 - 22 mg/dL 13   Creatinine 0.50 - 1.40 mg/dL 0.96   GFR (CKD-EPI) >60 mL/min/1.73 m 2 114   Calcium 8.5 - 10.5 mg/dL 8.8   Correct Calcium 8.5 - 10.5 mg/dL 8.5   AST(SGOT) 12 - 45 U/L 23   ALT(SGPT) 2 - 50 U/L 14   Alkaline Phosphatase 30 - 99 U/L 103 (H)   Total Bilirubin 0.1 - 1.5 mg/dL 1.3   Albumin 3.2 - 4.9 g/dL 4.4   Total Protein 6.0 - 8.2 g/dL 7.3   Globulin 1.9 - 3.5 g/dL 2.9    A-G Ratio g/dL 1.5   Uric Acid 2.5 - 8.3 mg/dL 7.2   LDH Total 107 - 266 U/L 244   SARS-CoV-2 (COVID-19) RNA by SLAVA  NotDetected   Adenovirus, PCR  Not Detected   Bordetella parapertussis (RC2320), PCR  Not Detected   Bordetella pertussis (ptxP), PCR  Not Detected   Chlamydia pneumoniae, PCR  Not Detected   Coronavirus 229E, PCR  Not Detected   Coronavirus HKU1, PCR  Not Detected   Coronavirus NL63, PCR  Not Detected   Coronavirus OC43, PCR  Not Detected   Human Metapneumovirus, PCR  Not Detected   Influenza A, PCR  Not Detected   Influenza B, PCR  Not Detected   Parainfluenza 1, PCR  Not Detected   Parainfluenza 2, PCR  Not Detected   Parainfluenza 3, PCR  Not Detected   Parainfluenza 4, PCR  Not Detected   Rhino/Enterovirus, PCR  Not Detected   RSV (Respiratory Syncytial Virus), PCR  Not Detected   Mycoplasma pneumoniae, PCR  Not Detected   (LL): Data is critically low  (L): Data is abnormally low  (H): Data is abnormally high  !: Data is abnormal    Physical Exam:    Constitutional: Well-developed, well-nourished, and in no distress.  Overall well-appearing however does have the appearance of relapse.  HENT: Normocephalic and atraumatic. No nasal congestion or rhinorrhea. Oropharynx is clear and moist. No oral ulcerations or sores.    Eyes: Conjunctivae are normal. Pupils are equal, round, and reactive to light.    Neck: Normal range of motion of neck, no adenopathy.    Cardiovascular: Tachycardia with heart rate to 135, regular rhythm and normal heart sounds.  No murmur heard. DP/radial pulses 2+, cap refill < 2 sec.  Pulmonary/Chest: Effort normal and breath sounds normal. No respiratory distress. Symmetric expansion.  No crackles or wheezes.  Abdomen: Soft. Bowel sounds are normal. No distension and no mass. There is no hepatosplenomegaly.    Genitourinary:  Deferred.  Femoral Runkel rash  Musculoskeletal: Normal range of motion of lower and upper extremities bilaterally. No tenderness to palpation of  elbows, wrists, hands, knees, ankles and feet bilaterally.   Lymphadenopathy: No cervical adenopathy, axillary adenopathy or inguinal adenopathy.   Neurological: Alert and oriented to person and place. Exhibits normal muscle tone bilaterally in upper and lower extremities. Gait normal. Coordination normal.    Skin: Skin is warm, dry and pink.  With some mild erythema on right dorsal forearm.  Also with paronychia with out discharge but intense deep erythema of the fourth distal phalanx of the right hand.  Psychiatric: Mood and affect normal for age.    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:   - As above in Oncologic History:   - 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   - 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, received cranial radiation 6/5/2023 to 6/16/2023   - Testicular disease negative at diagnosis   - Several complications throughout therapy to include severe septic shock 12/27/2022 while in Delayed Intensification   - Completed therapy 5/30/2024   - Last seen in clinic on 1/15/2025 without any evidence of relapse (clinical/laboratory)   - Reported viral URI symptoms approximately 2.5 weeks prior to presentation today   - Interval resolution of viral URI symptoms followed by very acute worsening of flulike symptoms as well as aches and pains   - Seen in clinic earlier today and laboratory workup obtained     - WBC 1000 cells/uL, Hgb 10.6 g/dL, platelets 53,000 cells/uL   - ANC 10, , absolute monocyte count 20     - Precipitous drop of counts over the past month with context of low-grade temperatures and bone pain most consistent with relapsed leukemia     - Will admit for Fever and  Neutropenia as below     - Plan for double-lumen Broviac line placement (have contacted surgeon), diagnostic bone marrow evaluation to include aspirate and biopsy, flow cytometry and FISH to confirm relapse.  While in OR, will also perform lumbar puncture to restage CNS disease with intrathecal cytarabine     - Have already reached out to transplant colleagues to begin planning allogenic transplant.  Will investigate insurance and transplant options tomorrow.  Will discuss possibilities of reinduction with colleagues.     - Possible choices for reinduction include intense for drug reinduction, steroids + imatinib + blinatumomab, or blinatumomab monotherapy.    2) Fever and Neutropenia:   - ANC 10 in clinic   - Fever to 100.6 °F in clinic   - Paronychia of the fourth distal phalanx of the right hand as possible source of infection   - Admission to [facility] tonight   - Given clinical stability, no bolus administered   - Blood culture obtained peripherally (no central access) PENDING   - Will order HSV studies on blood given HSV 1 positive titers previously as well as fourth distal phalanx paronychia   - Cefepime 2 g IV Q8 hours started empirically   - Tylenol 650 mg PO Q6 hours for fever   - Supportive care of symptoms    3) Paronychia:   - Paronychia of the right fourth distal phalanx with intense erythema   - 3-4 x daily warm water bath soaks   - Mupirocin ointment topically   - Cefepime as above   - 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  Currently without fluid to be swabbed.    4) 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:   - No presenting white blood cell count of 1.0, mild to moderate risk    - LDH normal at 244   - Potassium on presentation 3.9, calcium 8.8, uric acid however 7.2   - Continue hydration with normal saline    - Begin Allopurinol 200 mg PO TID   -- Daily TLS labs    5) Disease Related Pancytopenia:   - WBC 1000  cells/uL, Hgb 10.6 g/dL, platelets 53,000 cells/uL   - ANC 10, , absolute monocyte count 20   - Transfuse irradiated PRBC for Hgb<7 g/dL or symptomatic   - Transfuse irradiated platelets for platelet count of <10,000 cells/uL or symptomatic     - Patient will be going to transplant and therefore irradiated blood products only to be administered   - Transfuse 1 unit irradiated platelets today in anticipation of possible line placement, lumbar puncture and bone marrow evaluation    6) Vascular Access:   - None currently   - Will need double-lumen CVL placed (Surgery Consulted)    7) Social:   - Will we refer to Social Work and financial navigator      Disposition: Inpatient for fever and neutropenia, workup of presumed relapsed disease, referral to transplant    Pepe Faye MD  Pediatric Hematology / Oncology  [facility]  Cell.  [phone]  Office. [phone]

## 2025-03-01 LAB
ALBUMIN SERPL BCP-MCNC: 3.6 G/DL (ref 3.2–4.9)
ALBUMIN/GLOB SERPL: 1.5 G/DL
ALP SERPL-CCNC: 83 U/L (ref 30–99)
ALT SERPL-CCNC: 11 U/L (ref 2–50)
ANION GAP SERPL CALC-SCNC: 11 MMOL/L (ref 7–16)
AST SERPL-CCNC: 19 U/L (ref 12–45)
BASOPHILS # BLD AUTO: 0 % (ref 0–1.8)
BASOPHILS # BLD: 0 K/UL (ref 0–0.12)
BILIRUB SERPL-MCNC: 0.5 MG/DL (ref 0.1–1.5)
BUN SERPL-MCNC: 13 MG/DL (ref 8–22)
CALCIUM ALBUM COR SERPL-MCNC: 8.4 MG/DL (ref 8.5–10.5)
CALCIUM SERPL-MCNC: 8.1 MG/DL (ref 8.5–10.5)
CHLORIDE SERPL-SCNC: 105 MMOL/L (ref 96–112)
CO2 SERPL-SCNC: 22 MMOL/L (ref 20–33)
CREAT SERPL-MCNC: 0.72 MG/DL (ref 0.5–1.4)
EOSINOPHIL # BLD AUTO: 0 K/UL (ref 0–0.51)
EOSINOPHIL NFR BLD: 0 % (ref 0–6.9)
ERYTHROCYTE [DISTWIDTH] IN BLOOD BY AUTOMATED COUNT: 36.2 FL (ref 35.9–50)
GFR SERPLBLD CREATININE-BSD FMLA CKD-EPI: 131 ML/MIN/1.73 M 2
GLOBULIN SER CALC-MCNC: 2.4 G/DL (ref 1.9–3.5)
GLUCOSE SERPL-MCNC: 104 MG/DL (ref 65–99)
HCT VFR BLD AUTO: 24.8 % (ref 42–52)
HGB BLD-MCNC: 8.4 G/DL (ref 14–18)
LYMPHOCYTES # BLD AUTO: 0.66 K/UL (ref 1–4.8)
LYMPHOCYTES NFR BLD: 94.8 % (ref 22–41)
MANUAL DIFF BLD: ABNORMAL
MCH RBC QN AUTO: 28.3 PG (ref 27–33)
MCHC RBC AUTO-ENTMCNC: 33.9 G/DL (ref 32.3–36.5)
MCV RBC AUTO: 83.5 FL (ref 81.4–97.8)
MICROCYTES BLD QL SMEAR: ABNORMAL
MONOCYTES # BLD AUTO: 0.01 K/UL (ref 0–0.85)
MONOCYTES NFR BLD AUTO: 0.9 % (ref 0–13.4)
NEUTROPHILS # BLD AUTO: 0 K/UL (ref 1.82–7.42)
NEUTROPHILS NFR BLD: 0 % (ref 44–72)
PATH REV: NORMAL
PATH REV: NORMAL
PHOSPHATE SERPL-MCNC: 3.5 MG/DL (ref 2.5–4.5)
PLATELET # BLD AUTO: 51 K/UL (ref 164–446)
PLATELET BLD QL SMEAR: NORMAL
PLATELETS.RETICULATED NFR BLD AUTO: 4.8 % (ref 0.6–13.1)
PMV BLD AUTO: 10.5 FL (ref 9–12.9)
POTASSIUM SERPL-SCNC: 4 MMOL/L (ref 3.6–5.5)
PROT SERPL-MCNC: 6 G/DL (ref 6–8.2)
RBC # BLD AUTO: 2.97 M/UL (ref 4.7–6.1)
RBC BLD AUTO: PRESENT
SODIUM SERPL-SCNC: 138 MMOL/L (ref 135–145)
URATE SERPL-MCNC: 3.8 MG/DL (ref 2.5–8.3)
WBC # BLD AUTO: 0.7 K/UL (ref 4.8–10.8)
WBC OTHER NFR BLD MANUAL: 4.3 %

## 2025-03-01 PROCEDURE — 302131 K PAD MOTOR: Performed by: PEDIATRICS

## 2025-03-01 PROCEDURE — 84550 ASSAY OF BLOOD/URIC ACID: CPT

## 2025-03-01 PROCEDURE — 700102 HCHG RX REV CODE 250 W/ 637 OVERRIDE(OP): Performed by: PEDIATRICS

## 2025-03-01 PROCEDURE — 80053 COMPREHEN METABOLIC PANEL: CPT

## 2025-03-01 PROCEDURE — 700105 HCHG RX REV CODE 258: Performed by: PEDIATRICS

## 2025-03-01 PROCEDURE — 700111 HCHG RX REV CODE 636 W/ 250 OVERRIDE (IP): Mod: JZ | Performed by: PEDIATRICS

## 2025-03-01 PROCEDURE — 770004 HCHG ROOM/CARE - ONCOLOGY PRIVATE *

## 2025-03-01 PROCEDURE — 85007 BL SMEAR W/DIFF WBC COUNT: CPT

## 2025-03-01 PROCEDURE — 302152 K-PAD 12X17: Performed by: PEDIATRICS

## 2025-03-01 PROCEDURE — 84100 ASSAY OF PHOSPHORUS: CPT

## 2025-03-01 PROCEDURE — 85027 COMPLETE CBC AUTOMATED: CPT

## 2025-03-01 PROCEDURE — 99233 SBSQ HOSP IP/OBS HIGH 50: CPT | Performed by: PEDIATRICS

## 2025-03-01 PROCEDURE — A9270 NON-COVERED ITEM OR SERVICE: HCPCS | Performed by: PEDIATRICS

## 2025-03-01 PROCEDURE — 85055 RETICULATED PLATELET ASSAY: CPT

## 2025-03-01 PROCEDURE — 80503 PATH CLIN CONSLTJ SF 5-20: CPT

## 2025-03-01 RX ORDER — FAMOTIDINE 20 MG/1
40 TABLET, FILM COATED ORAL 2 TIMES DAILY
Status: DISCONTINUED | OUTPATIENT
Start: 2025-03-01 | End: 2025-03-09 | Stop reason: HOSPADM

## 2025-03-01 RX ADMIN — OXYCODONE HYDROCHLORIDE 5 MG: 5 TABLET ORAL at 03:55

## 2025-03-01 RX ADMIN — ALLOPURINOL 200 MG: 100 TABLET ORAL at 17:41

## 2025-03-01 RX ADMIN — MUPIROCIN 1 DOSE: 20 OINTMENT TOPICAL at 10:55

## 2025-03-01 RX ADMIN — MUPIROCIN 2 %: 20 OINTMENT TOPICAL at 04:34

## 2025-03-01 RX ADMIN — ALLOPURINOL 200 MG: 100 TABLET ORAL at 04:33

## 2025-03-01 RX ADMIN — ACETAMINOPHEN 650 MG: 325 TABLET ORAL at 16:41

## 2025-03-01 RX ADMIN — CEFEPIME 2 G: 2 INJECTION, POWDER, FOR SOLUTION INTRAVENOUS at 22:09

## 2025-03-01 RX ADMIN — ALLOPURINOL 200 MG: 100 TABLET ORAL at 12:14

## 2025-03-01 RX ADMIN — PREDNISONE 60 MG: 50 TABLET ORAL at 17:40

## 2025-03-01 RX ADMIN — CEFEPIME 2 G: 2 INJECTION, POWDER, FOR SOLUTION INTRAVENOUS at 14:09

## 2025-03-01 RX ADMIN — CEFEPIME 2 G: 2 INJECTION, POWDER, FOR SOLUTION INTRAVENOUS at 04:36

## 2025-03-01 RX ADMIN — SODIUM CHLORIDE: 9 INJECTION, SOLUTION INTRAVENOUS at 06:34

## 2025-03-01 RX ADMIN — MUPIROCIN 1 DOSE: 20 OINTMENT TOPICAL at 17:41

## 2025-03-01 RX ADMIN — FAMOTIDINE 40 MG: 20 TABLET, FILM COATED ORAL at 17:41

## 2025-03-01 ASSESSMENT — PAIN DESCRIPTION - PAIN TYPE
TYPE: ACUTE PAIN

## 2025-03-01 NOTE — CARE PLAN
The patient is Watcher - Medium risk of patient condition declining or worsening    Shift Goals  Clinical Goals: pain control, monitor fevers, monitor labs  Patient Goals: pain control  Family Goals: POC updates    Progress made toward(s) clinical / shift goals:       Problem: Knowledge Deficit - Standard  Goal: Patient and family/care givers will demonstrate understanding of plan of care, disease process/condition, diagnostic tests and medications  Outcome: Progressing     Problem: Pain - Standard  Goal: Alleviation of pain or a reduction in pain to the patient’s comfort goal  Outcome: Progressing

## 2025-03-01 NOTE — PROGRESS NOTES
Pediatric Hematology/Oncology  Daily Progress Note      Patient Name:  Tomas Jean-Baptiste  : 2000  MRN: [id number]    Location of Service:  [facility]  Date of Service: 3/1/2025  Time: 9:00 AM    Hospital Day: 2    Protocol / Treatment Plan:  Relapsed Leukemia NOS - Prednisone     SUBJECTIVE:     Yesterday had Double Lumen CVL (left) placed.  Also with bone marrow aspiration and diagnostic lumbar puncture.  He tolerated the procedures well and today complains only of some soreness on his chest.  No complaints of any headaches, changes in vision or neurologic status changes.  Tomas Roy reports that he has no shortness of breath or cough.  JULY does not complain of any abdominal pain.  No constipation or diarrhea.  JULY reports that he has no other aches or pains.  No complaints of any skin changes other than right forearm and right 4th distal phalanx which have improved.  Febrile with Tmax 101.6F.  No other concerns ro complaints.    Review of Systems:     Constitutional: Febrile to 101.6F, feeling better than yesterday.   HENT: Negative for auditory changes or ear pain, no nosebleeds, no congestion, no rhinorrhea, no sore throat.  No mouth sores.  Eyes: Negative for visual changes.  Respiratory: Negative for shortness of breath.  Cardiovascular: Negative.  Gastrointestinal: Negative for nausea, vomiting, abdominal pain, diarrhea, constipation.  Genitourinary: Negative.  Musculoskeletal: Negative for arm pain or leg pain.  Chest pain at surgical site.  Skin: Negative for rash or skin infection.  Neurological: Negative for numbness, tingling, sensory changes, weakness or headaches.    Endo/Heme/Allergies: No bruising/bleeding easily.    Psychiatric/Behavioral: Stable mood.     OBJECTIVE:     Max Temp: Temp (24hrs), Av.5 °C (99.5 °F), Min:36.6 °C (97.9 °F), Max:38.7 °C (101.6 °F)    Vitals: /75   Pulse (!) 111   Temp 37.2 °C (99 °F) (Oral)   Resp 18   Ht 1.651 m (5'  "5\")   Wt 77.7 kg (171 lb 4.8 oz)   SpO2 93%   BMI 28.51 kg/m²     I/O:   Intake/Output Summary (Last 24 hours) at 3/1/2025 1211  Last data filed at 2/28/2025 1400  Gross per 24 hour   Intake 312 ml   Output --   Net 312 ml       Labs:     Latest Reference Range & Units 03/01/25 03:59 03/01/25 09:48   WBC 4.8 - 10.8 K/uL 0.7 (LL)    RBC 4.70 - 6.10 M/uL 2.97 (L)    Hemoglobin 14.0 - 18.0 g/dL 8.4 (L)    Hematocrit 42.0 - 52.0 % 24.8 (L)    MCV 81.4 - 97.8 fL 83.5    MCH 27.0 - 33.0 pg 28.3    MCHC 32.3 - 36.5 g/dL 33.9    RDW 35.9 - 50.0 fL 36.2    Platelet Count 164 - 446 K/uL 51 (L)    MPV 9.0 - 12.9 fL 10.5    Neutrophils-Polys 44.00 - 72.00 % 0.00 (L)    Neutrophils (Absolute) 1.82 - 7.42 K/uL 0.00 (LL)    Lymphocytes 22.00 - 41.00 % 94.80 (H)    Lymphs (Absolute) 1.00 - 4.80 K/uL 0.66 (L)    Monocytes 0.00 - 13.40 % 0.90    Monos (Absolute) 0.00 - 0.85 K/uL 0.01    Eosinophils 0.00 - 6.90 % 0.00    Eos (Absolute) 0.00 - 0.51 K/uL 0.00    Basophils 0.00 - 1.80 % 0.00    Baso (Absolute) 0.00 - 0.12 K/uL 0.00    Other % 4.30    Plt Estimation  Decreased    Imm. Plt Fraction 0.6 - 13.1 % 4.8    RBC Morphology  Present    Microcytosis  2+ !    Interpretation  See comment    Reviewed By Hematology  see below    Manual Diff Status  PERFORMED    Sodium 135 - 145 mmol/L 138    Potassium 3.6 - 5.5 mmol/L 4.0    Chloride 96 - 112 mmol/L 105    Co2 20 - 33 mmol/L 22    Anion Gap 7.0 - 16.0  11.0    Glucose 65 - 99 mg/dL 104 (H)    Bun 8 - 22 mg/dL 13    Creatinine 0.50 - 1.40 mg/dL 0.72    GFR (CKD-EPI) >60 mL/min/1.73 m 2 131    Calcium 8.5 - 10.5 mg/dL 8.1 (L)    Correct Calcium 8.5 - 10.5 mg/dL 8.4 (L)    AST(SGOT) 12 - 45 U/L 19    ALT(SGPT) 2 - 50 U/L 11    Alkaline Phosphatase 30 - 99 U/L 83    Total Bilirubin 0.1 - 1.5 mg/dL 0.5    Albumin 3.2 - 4.9 g/dL 3.6    Total Protein 6.0 - 8.2 g/dL 6.0    Globulin 1.9 - 3.5 g/dL 2.4    A-G Ratio g/dL 1.5    Phosphorus 2.5 - 4.5 mg/dL  3.5   Uric Acid 2.5 - 8.3 mg/dL  3.8 "   (LL): Data is critically low  (L): Data is abnormally low  (H): Data is abnormally high  !: Data is abnormal     Latest Reference Range & Units 02/28/25 15:15   Number Of Tubes  2   Volume mL 1.5   Color-Body Fluid  Colorless   Character-Body Fluid  Clear   Supernatant Appearance  Colorless   CSF Total Nucleated Cells 0 - 10 cells/uL 1   Total RBC Count cells/uL 1   Crenated RBC % 0     Physical Exam:    Constitutional: Overall well appearing.  Non-toxic.  Improved color and appearance today.  HENT: Normocephalic and atraumatic.  No rhinorrhea. Oropharynx is clear and moist.   Eyes: Conjunctivae are normal. EOMI. Non-icteric. Pupils equal and round.  Neck: Normal range of motion of neck, no adenopathy.    Cardiovascular: Regular rate, regular rhythm.  No murmur. DP/radial pulses 2+, cap refill < 2 sec.  Pulmonary/Chest: Effort normal. No respiratory distress. Symmetric expansion.  No crackles or wheezes.  Abdomen: Soft. Bowel sounds are normal. No distension and no mass. There is no hepatosplenomegaly.    Genitourinary:  Deferred.  Musculoskeletal: Normal range of motion of lower and upper extremities bilaterally.   Neurological: Alert and oriented to person and place. Exhibits normal muscle tone bilaterally in upper and lower extremities. Gait normal. Coordination normal.    Skin: Improved erythema of right distal fourth phalanx.  Right forearm with Bactroban ointment, stable rash.  Psychiatric: Mood is stable.      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, received cranial  radiation 6/5/2023 to 6/16/2023              - Testicular disease negative at diagnosis              - Several complications throughout therapy to include severe septic shock 12/27/2022 while in Delayed Intensification              - Completed therapy 5/30/2024              - Last seen in clinic on 1/15/2025 without any evidence of relapse (clinical/laboratory)              - Reported viral URI symptoms approximately 2.5 weeks prior to presentation               - Interval resolution of viral URI symptoms followed by very acute worsening of flulike symptoms as well as aches and pains              - Seen in clinic 2/27/2025:     - WBC 1000 cells/uL, Hgb 10.6 g/dL, platelets 53,000 cells/uL               - ANC 10, , absolute monocyte count 20                 - Precipitous drop of counts over the past month with context of low-grade temperatures and bone pain most consistent with relapsed leukemia                 - Admission for Fever and Neutropenia 2/27/2025                 - Double-lumen Broviac line placement, diagnostic bone marrow evaluation to include aspirate and biopsy, flow cytometry and FISH to confirm relapse at bedside 2/28/2025  - Testicular negative  - CSF appears negative (cytology PENDING)               - Have already reached out to transplant colleagues to begin planning allogenic transplant.                 - Possible choices for reinduction include intense for drug reinduction, steroids + imatinib + blinatumomab, or blinatumomab monotherapy.     2) Fever and Neutropenia:              - ANC 10 2/27/2025              - Fever to 101.6 °F 2/27/2025              - Paronychia of the fourth distal phalanx of the right hand as possible source of infection - greatly improved since starting antibiotics              - Admission to [facility] 2/27/2025              - Given clinical stability, no bolus administered              - Blood culture obtained peripherally (no central access) NGTD              - Ordered  HSV studies on blood given HSV 1 positive titers previously as well as fourth distal phalanx paronychia PENDING              - Cefepime 2 g IV Q8 hours started empirically              - Tylenol 650 mg PO Q6 hours for fever              - Supportive care of symptoms     3) Paronychia: (IMPROVED)              - Paronychia of the right fourth distal phalanx with intense erythema              - 3-4 x daily warm water bath soaks              - Mupirocin ointment topically              - Cefepime as above              - 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  Currently without fluid to be swabbed.     4) 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:              - Presenting white blood cell count of 1.0, mild to moderate risk               - LDH normal at 244              - Potassium on presentation 3.9, calcium 8.8, uric acid however 7.2              - Continue hydration with normal saline               - Allopurinol 200 mg PO TID              - Daily TLS labs      - K+ 4.0, Uric Acid 3.8, Phos 3.5, Ca++ 8.1 today    5) Disease Related Pancytopenia:              -  cells/uL, Hgb 8.4 g/dL, platelets 51,000 cells/uL              - ANC 0, , absolute monocyte count 10              - Transfuse irradiated PRBC for Hgb<7 g/dL or symptomatic              - Transfuse irradiated platelets for platelet count of <10,000 cells/uL or symptomatic                 - No transfusion today   - CBC daily     6) Vascular Access:              - None currently              - Double-lumen CVL to be placed 2/28/2025      7) Social:              - Referred  to Social Work and financial navigator     Disposition: Inpatient for fever and neutropenia, workup of presumed relapsed disease, referral to transplant    Pepe Faye MD  Pediatric Hematology / Oncology  [facility]  Cell.  [phone]  Office. [phone]

## 2025-03-01 NOTE — PROGRESS NOTES
Pt platelet transfusion completed while pt was receiving a sterile procedure (bajwa's catheter placement and bone marrow biopsy) this RN is unsure what time exactly the transfusion was completed, but pt seems to have tolerated all events appropriately.   stopped in July/Yes

## 2025-03-01 NOTE — PROCEDURES
Pediatric Oncology Lumbar Puncture  Procedure Note      Patient Name:  Tomas Jean-Baptiste  : 2000   MRN: [id number]    Service Location:  [facility]  Date of Service: 2025  Time: 3:30 PM    Procedure Performed By: Pepe Faye M.D.    Pre-procedural Diagnosis:  Ph+ Acute B-Lymphoblastic Leukemia (C91.02) having relapsed    Post-procedural Diagnosis: Acute B-Lymphoblastic Leukemia (C91.02) having relapsed    Procedure:  Lumbar Puncture diagnostic only    Sedation:  Moderate Sedation, Dr. Baumgarten, Sedation Nurse    Intrathecal Chemotherapy:  No    Needle Size:  22 gauge, 3.5 in  Site: L3-L4  Number of Attempts: 1  Fluid:  6 ml clear fluid obtained  Labs: Cell count, cytology    Complications:  None    Procedure Note:    Tomas Jean-Baptiste is a 24 y.o. male with relapsed Ph+ Acute B-Lymphoblastic Leukemia (C91.02) He presented last night for direct admission with fever and neutropenia.  As part of the staging workup for his presumed relapse , Tomas Roy requires a lumbar puncture today.   Prior to the procedure, the risks and benefits were discussed with the patient .  Consent for the procedure was signed by patient himself and placed in his chart.  All pertinent labs and history were reviewed and a complete History and Physical Examination were performed and placed in the medical record.   All necessary safety equipment per ASA guidelines were available.  A time-out was performed and the patient identified by name,  and medical record number.  Dr. Baumgarten completed her line placement which was followed by bone marrow aspiration.  Tomas Roy remained in left lateral decubitus after bone marrow for lumbar puncture.  Gloves and mask were worn during the entire procedure.  Tomas Roy was prepped and draped in the usual sterile fashion with povoiodine. All landmarks including superior posterior iliac crest, umbilicus and  vertebral bodies were identified by palpation.  A 3.5 in, 22 gauge spinal needle was introduced into the L3-L4 spinal interspace.  6 ml of clear fluid were obtained and sent for cell count and cytology. JULY tolerated the procedure without complication or bleeding.     Results:    PENDING    Pepe Faye MD  Pediatric Hematology / Oncology  [facility]  Cell.  [phone]

## 2025-03-01 NOTE — CARE PLAN
The patient is Watcher - Medium risk of patient condition declining or worsening    Shift Goals  Clinical Goals: pain control, monitor fevers, monitor labs  Patient Goals: rest, comfort, pain control  Family Goals: POC updates    Progress made toward(s) clinical / shift goals:      Problem: Knowledge Deficit - Standard  Goal: Patient and family/care givers will demonstrate understanding of plan of care, disease process/condition, diagnostic tests and medications  Outcome: Progressing     Problem: Pain - Standard  Goal: Alleviation of pain or a reduction in pain to the patient’s comfort goal  Outcome: Progressing

## 2025-03-01 NOTE — PROCEDURES
Pediatric Oncology Bone Marrow Aspirate  Procedure Note      Patient Name:  Tomas Jean-Baptiste  : 2000  MRN: [id number]    Date of Service: 2025  Time: 3:15 PM    Procedure Performed By: Pepe Faye MD    Location of Procedure:      Pre-procedural Diagnosis:   Post-procedural Diagnosis:     Procedure:  Bone Marrow Aspiration    Sedation:  Moderate sedation  (Dr. Baumgarten), subcutaneous and periosteal lidocaine injection for local pain control    Needle Size:  15 gauge I-type bone marrow aspiration needle  Site: Right Superior Posterior Iliac Crest    Complications:  None    Bleeding:  None    Procedure Note:    Tomas Jean-Baptiste is a 24 y.o. male who presented to the [facility] as a direct admission for fever and neutropenia in the context of presumed relapse of his Ph+ Acute B-Lymphoblastic Leukemia. Prior to the procedure, the risks and benefits were discussed with the patient.  Consent for the procedure was signed by patient himself and placed in the patient's chart.  All pertinent labs and history were reviewed and a complete physical examination performed prior to the procedure.  All necessary safety equipment per ASA guidelines were available, sedation MD, and sedation nurse available.  A time-out was performed and the patient identified by name,  and medical record number. Prior to bone marrow, Dr. Baumgarten placed a left chest 7 Fr double lumen CVL successfully.  Following her procedure, .    Gowns, gloves and mask were worn during the entire procedure.  was placed in the left lateral decubitus position.   was prepped and draped in the usual sterile fashion and the site was cleansed with povoiodine (Betadine).   All bony landmarks were palpated including both iliac crests and vertebral bodies.  The subcutaneous tissue and periosteum of the right superior posterior iliac crest was  infilrated with lidocaine.  An initial insertion was made with a 15  gauge I-type aspirate needle and resulted freely flowing marrow for clot section as well as send out for leukemia/lymphoma phenotypicing as well as cytogenetics and FISH analysis. The patient tolerated the procedure without complications and only minimal bleeding.      Pepe Faye MD  Pediatric Hematology / Oncology  [facility]  Cell.  [phone]

## 2025-03-02 LAB
ALBUMIN SERPL BCP-MCNC: 4 G/DL (ref 3.2–4.9)
ALBUMIN/GLOB SERPL: 1.4 G/DL
ALP SERPL-CCNC: 89 U/L (ref 30–99)
ALT SERPL-CCNC: 15 U/L (ref 2–50)
ANION GAP SERPL CALC-SCNC: 10 MMOL/L (ref 7–16)
ANISOCYTOSIS BLD QL SMEAR: ABNORMAL
AST SERPL-CCNC: 18 U/L (ref 12–45)
BASOPHILS # BLD AUTO: 0 % (ref 0–1.8)
BASOPHILS # BLD: 0 K/UL (ref 0–0.12)
BILIRUB SERPL-MCNC: 0.4 MG/DL (ref 0.1–1.5)
BLASTS NFR BLD MANUAL: 2.6 %
BUN SERPL-MCNC: 12 MG/DL (ref 8–22)
CALCIUM ALBUM COR SERPL-MCNC: 8.7 MG/DL (ref 8.5–10.5)
CALCIUM SERPL-MCNC: 8.7 MG/DL (ref 8.5–10.5)
CHLORIDE SERPL-SCNC: 106 MMOL/L (ref 96–112)
CO2 SERPL-SCNC: 20 MMOL/L (ref 20–33)
CREAT SERPL-MCNC: 0.57 MG/DL (ref 0.5–1.4)
EBV EA-D IGG SER-ACNC: 10 U/ML (ref 0–10.9)
EBV NA IGG SER IA-ACNC: >600 U/ML (ref 0–21.9)
EBV VCA IGG SER IA-ACNC: 184 U/ML (ref 0–21.9)
EBV VCA IGM SER IA-ACNC: <10 U/ML (ref 0–43.9)
EOSINOPHIL # BLD AUTO: 0 K/UL (ref 0–0.51)
EOSINOPHIL NFR BLD: 0 % (ref 0–6.9)
ERYTHROCYTE [DISTWIDTH] IN BLOOD BY AUTOMATED COUNT: 34.5 FL (ref 35.9–50)
GFR SERPLBLD CREATININE-BSD FMLA CKD-EPI: 141 ML/MIN/1.73 M 2
GLOBULIN SER CALC-MCNC: 2.9 G/DL (ref 1.9–3.5)
GLUCOSE SERPL-MCNC: 169 MG/DL (ref 65–99)
HCT VFR BLD AUTO: 26.5 % (ref 42–52)
HGB BLD-MCNC: 9 G/DL (ref 14–18)
LYMPHOCYTES # BLD AUTO: 0.39 K/UL (ref 1–4.8)
LYMPHOCYTES NFR BLD: 97.4 % (ref 22–41)
MANUAL DIFF BLD: ABNORMAL
MCH RBC QN AUTO: 27.6 PG (ref 27–33)
MCHC RBC AUTO-ENTMCNC: 34 G/DL (ref 32.3–36.5)
MCV RBC AUTO: 81.3 FL (ref 81.4–97.8)
MICROCYTES BLD QL SMEAR: ABNORMAL
MONOCYTES # BLD AUTO: 0 K/UL (ref 0–0.85)
MONOCYTES NFR BLD AUTO: 0 % (ref 0–13.4)
NEUTROPHILS # BLD AUTO: 0 K/UL (ref 1.82–7.42)
NEUTROPHILS NFR BLD: 0 % (ref 44–72)
PHOSPHATE SERPL-MCNC: 3.7 MG/DL (ref 2.5–4.5)
PLATELET # BLD AUTO: 37 K/UL (ref 164–446)
PLATELET BLD QL SMEAR: NORMAL
PLATELETS.RETICULATED NFR BLD AUTO: 5.6 % (ref 0.6–13.1)
PMV BLD AUTO: 11.3 FL (ref 9–12.9)
POTASSIUM SERPL-SCNC: 4.3 MMOL/L (ref 3.6–5.5)
PROT SERPL-MCNC: 6.9 G/DL (ref 6–8.2)
RBC # BLD AUTO: 3.26 M/UL (ref 4.7–6.1)
RBC BLD AUTO: PRESENT
SODIUM SERPL-SCNC: 136 MMOL/L (ref 135–145)
URATE SERPL-MCNC: 3.1 MG/DL (ref 2.5–8.3)
WBC # BLD AUTO: 0.4 K/UL (ref 4.8–10.8)

## 2025-03-02 PROCEDURE — 700111 HCHG RX REV CODE 636 W/ 250 OVERRIDE (IP): Performed by: PEDIATRICS

## 2025-03-02 PROCEDURE — 700105 HCHG RX REV CODE 258: Performed by: PEDIATRICS

## 2025-03-02 PROCEDURE — 84100 ASSAY OF PHOSPHORUS: CPT

## 2025-03-02 PROCEDURE — 85027 COMPLETE CBC AUTOMATED: CPT

## 2025-03-02 PROCEDURE — 80053 COMPREHEN METABOLIC PANEL: CPT

## 2025-03-02 PROCEDURE — 99233 SBSQ HOSP IP/OBS HIGH 50: CPT | Performed by: PEDIATRICS

## 2025-03-02 PROCEDURE — 85055 RETICULATED PLATELET ASSAY: CPT

## 2025-03-02 PROCEDURE — A9270 NON-COVERED ITEM OR SERVICE: HCPCS | Performed by: PEDIATRICS

## 2025-03-02 PROCEDURE — 770004 HCHG ROOM/CARE - ONCOLOGY PRIVATE *

## 2025-03-02 PROCEDURE — 84550 ASSAY OF BLOOD/URIC ACID: CPT

## 2025-03-02 PROCEDURE — 700102 HCHG RX REV CODE 250 W/ 637 OVERRIDE(OP): Performed by: PEDIATRICS

## 2025-03-02 PROCEDURE — 85007 BL SMEAR W/DIFF WBC COUNT: CPT

## 2025-03-02 RX ADMIN — ALLOPURINOL 200 MG: 100 TABLET ORAL at 05:04

## 2025-03-02 RX ADMIN — MUPIROCIN 2 %: 20 OINTMENT TOPICAL at 17:03

## 2025-03-02 RX ADMIN — ALLOPURINOL 200 MG: 100 TABLET ORAL at 17:03

## 2025-03-02 RX ADMIN — OXYCODONE HYDROCHLORIDE 5 MG: 5 TABLET ORAL at 05:06

## 2025-03-02 RX ADMIN — PREDNISONE 60 MG: 50 TABLET ORAL at 17:03

## 2025-03-02 RX ADMIN — FAMOTIDINE 40 MG: 20 TABLET, FILM COATED ORAL at 17:03

## 2025-03-02 RX ADMIN — PREDNISONE 60 MG: 50 TABLET ORAL at 05:04

## 2025-03-02 RX ADMIN — FAMOTIDINE 40 MG: 20 TABLET, FILM COATED ORAL at 05:04

## 2025-03-02 RX ADMIN — SODIUM CHLORIDE: 9 INJECTION, SOLUTION INTRAVENOUS at 08:46

## 2025-03-02 RX ADMIN — ALLOPURINOL 200 MG: 100 TABLET ORAL at 11:30

## 2025-03-02 RX ADMIN — CEFEPIME 2 G: 2 INJECTION, POWDER, FOR SOLUTION INTRAVENOUS at 13:45

## 2025-03-02 RX ADMIN — CEFEPIME 2 G: 2 INJECTION, POWDER, FOR SOLUTION INTRAVENOUS at 22:09

## 2025-03-02 RX ADMIN — MUPIROCIN 2 %: 20 OINTMENT TOPICAL at 11:31

## 2025-03-02 RX ADMIN — CEFEPIME 2 G: 2 INJECTION, POWDER, FOR SOLUTION INTRAVENOUS at 05:10

## 2025-03-02 RX ADMIN — SODIUM CHLORIDE: 9 INJECTION, SOLUTION INTRAVENOUS at 16:08

## 2025-03-02 RX ADMIN — MUPIROCIN 2 %: 20 OINTMENT TOPICAL at 05:05

## 2025-03-02 ASSESSMENT — PAIN DESCRIPTION - PAIN TYPE
TYPE: ACUTE PAIN

## 2025-03-02 NOTE — CARE PLAN
The patient is Stable - Low risk of patient condition declining or worsening    Shift Goals  Clinical Goals: Patient will remain without S/S of infection this shift. PRS will remian below 5/10 this shift  Patient Goals: To get a K pad  Family Goals: No questions    Progress made toward(s) clinical / shift goals:    Problem: Knowledge Deficit - Standard  Goal: Patient and family/care givers will demonstrate understanding of plan of care, disease process/condition, diagnostic tests and medications  Outcome: Progressing  Note: Discussed POC and shift goals with patient and primary team. All questions answered as able, MD to bedside with patient and family       Problem: Pain - Standard  Goal: Alleviation of pain or a reduction in pain to the patient’s comfort goal  Outcome: Progressing  Note: PRS and interventions available discussed with patient. Patient denies need for pain medication up to this point in the shift. Nonpharmacologic interventions offered, patient likes heat packs; k pad ordered.     Problem: Neutropenia Precautions  Goal: Neutropenic precautions will be implemented and maintained for patient protection  Outcome: Progressing  Note: Neutropenic precautions in place-  Hand hygiene for all staff visitors  Avoid exposure to visitors or staff with infection   No fresh flowers or plants   Avoid invasive procedures (suppositories, rectal temp, daniels cath)   Ensure all food is cooked thoroughly, no raw or rare meat.   Patient remains afebrile this shift.        Patient is not progressing towards the following goals: NA

## 2025-03-02 NOTE — CARE PLAN
The patient is Watcher - Medium risk of patient condition declining or worsening    Shift Goals  Clinical Goals: reamin afebrile, monitor labs, pain control, rest  Patient Goals: rest, pain control  Family Goals: POC updates    Progress made toward(s) clinical / shift goals:     Problem: Knowledge Deficit - Standard  Goal: Patient and family/care givers will demonstrate understanding of plan of care, disease process/condition, diagnostic tests and medications  Outcome: Progressing     Problem: Pain - Standard  Goal: Alleviation of pain or a reduction in pain to the patient’s comfort goal  Outcome: Progressing

## 2025-03-03 ENCOUNTER — APPOINTMENT (OUTPATIENT)
Dept: CARDIOLOGY | Facility: MEDICAL CENTER | Age: 25
DRG: 835 | End: 2025-03-03
Attending: PEDIATRICS
Payer: COMMERCIAL

## 2025-03-03 LAB
ALBUMIN SERPL BCP-MCNC: 3.9 G/DL (ref 3.2–4.9)
ALBUMIN/GLOB SERPL: 1.6 G/DL
ALP SERPL-CCNC: 77 U/L (ref 30–99)
ALT SERPL-CCNC: 12 U/L (ref 2–50)
ANION GAP SERPL CALC-SCNC: 8 MMOL/L (ref 7–16)
ANISOCYTOSIS BLD QL SMEAR: NORMAL
AST SERPL-CCNC: 12 U/L (ref 12–45)
BASOPHILS # BLD AUTO: 0 % (ref 0–1.8)
BASOPHILS # BLD: 0 K/UL (ref 0–0.12)
BILIRUB SERPL-MCNC: 0.4 MG/DL (ref 0.1–1.5)
BUN SERPL-MCNC: 11 MG/DL (ref 8–22)
CALCIUM ALBUM COR SERPL-MCNC: 8.5 MG/DL (ref 8.5–10.5)
CALCIUM SERPL-MCNC: 8.4 MG/DL (ref 8.5–10.5)
CHLORIDE SERPL-SCNC: 110 MMOL/L (ref 96–112)
CO2 SERPL-SCNC: 22 MMOL/L (ref 20–33)
COMMENT NL1176: ABNORMAL
CREAT SERPL-MCNC: 0.61 MG/DL (ref 0.5–1.4)
EOSINOPHIL # BLD AUTO: 0 K/UL (ref 0–0.51)
EOSINOPHIL NFR BLD: 0 % (ref 0–6.9)
ERYTHROCYTE [DISTWIDTH] IN BLOOD BY AUTOMATED COUNT: 33.9 FL (ref 35.9–50)
GFR SERPLBLD CREATININE-BSD FMLA CKD-EPI: 138 ML/MIN/1.73 M 2
GLOBULIN SER CALC-MCNC: 2.4 G/DL (ref 1.9–3.5)
GLUCOSE SERPL-MCNC: 200 MG/DL (ref 65–99)
HCT VFR BLD AUTO: 23.5 % (ref 42–52)
HGB BLD-MCNC: 8.4 G/DL (ref 14–18)
HSV1 DNA CSF QL NAA+PROBE: NOT DETECTED
HSV2 DNA CSF QL NAA+PROBE: NOT DETECTED
LV EJECT FRACT  99904: 60
LV EJECT FRACT MOD 2C 99903: 50.71
LV EJECT FRACT MOD 4C 99902: 51.73
LV EJECT FRACT MOD BP 99901: 49.44
LYMPHOCYTES # BLD AUTO: 0.7 K/UL (ref 1–4.8)
LYMPHOCYTES NFR BLD: 100 % (ref 22–41)
MANUAL DIFF BLD: ABNORMAL
MCH RBC QN AUTO: 28.4 PG (ref 27–33)
MCHC RBC AUTO-ENTMCNC: 35.7 G/DL (ref 32.3–36.5)
MCV RBC AUTO: 79.4 FL (ref 81.4–97.8)
MICROCYTES BLD QL SMEAR: NORMAL
MONOCYTES # BLD AUTO: 0 K/UL (ref 0–0.85)
MONOCYTES NFR BLD AUTO: 0 % (ref 0–13.4)
NEUTROPHILS # BLD AUTO: 0 K/UL (ref 1.82–7.42)
NEUTROPHILS NFR BLD: 0 % (ref 44–72)
PATHOLOGY CONSULT NOTE: NORMAL
PHOSPHATE SERPL-MCNC: 3 MG/DL (ref 2.5–4.5)
PLATELET # BLD AUTO: 32 K/UL (ref 164–446)
PLATELET BLD QL SMEAR: NORMAL
PLATELETS.RETICULATED NFR BLD AUTO: 2.7 % (ref 0.6–13.1)
PMV BLD AUTO: 10.9 FL (ref 9–12.9)
POTASSIUM SERPL-SCNC: 4.5 MMOL/L (ref 3.6–5.5)
PROT SERPL-MCNC: 6.3 G/DL (ref 6–8.2)
RBC # BLD AUTO: 2.96 M/UL (ref 4.7–6.1)
RBC BLD AUTO: PRESENT
SODIUM SERPL-SCNC: 140 MMOL/L (ref 135–145)
SPECIMEN SOURCE: NORMAL
URATE SERPL-MCNC: 1.9 MG/DL (ref 2.5–8.3)
WBC # BLD AUTO: 0.7 K/UL (ref 4.8–10.8)

## 2025-03-03 PROCEDURE — 80053 COMPREHEN METABOLIC PANEL: CPT

## 2025-03-03 PROCEDURE — 770004 HCHG ROOM/CARE - ONCOLOGY PRIVATE *

## 2025-03-03 PROCEDURE — 93306 TTE W/DOPPLER COMPLETE: CPT

## 2025-03-03 PROCEDURE — 84550 ASSAY OF BLOOD/URIC ACID: CPT

## 2025-03-03 PROCEDURE — 700102 HCHG RX REV CODE 250 W/ 637 OVERRIDE(OP): Performed by: PEDIATRICS

## 2025-03-03 PROCEDURE — 85007 BL SMEAR W/DIFF WBC COUNT: CPT

## 2025-03-03 PROCEDURE — 700111 HCHG RX REV CODE 636 W/ 250 OVERRIDE (IP): Mod: JZ | Performed by: PEDIATRICS

## 2025-03-03 PROCEDURE — 85055 RETICULATED PLATELET ASSAY: CPT

## 2025-03-03 PROCEDURE — 99233 SBSQ HOSP IP/OBS HIGH 50: CPT | Performed by: PEDIATRICS

## 2025-03-03 PROCEDURE — 93306 TTE W/DOPPLER COMPLETE: CPT | Mod: 26 | Performed by: INTERNAL MEDICINE

## 2025-03-03 PROCEDURE — 700105 HCHG RX REV CODE 258: Performed by: PEDIATRICS

## 2025-03-03 PROCEDURE — A9270 NON-COVERED ITEM OR SERVICE: HCPCS | Performed by: PEDIATRICS

## 2025-03-03 PROCEDURE — 85027 COMPLETE CBC AUTOMATED: CPT

## 2025-03-03 PROCEDURE — 84100 ASSAY OF PHOSPHORUS: CPT

## 2025-03-03 RX ADMIN — SODIUM CHLORIDE: 9 INJECTION, SOLUTION INTRAVENOUS at 13:16

## 2025-03-03 RX ADMIN — SODIUM CHLORIDE: 9 INJECTION, SOLUTION INTRAVENOUS at 05:47

## 2025-03-03 RX ADMIN — FAMOTIDINE 40 MG: 20 TABLET, FILM COATED ORAL at 05:51

## 2025-03-03 RX ADMIN — PREDNISONE 60 MG: 50 TABLET ORAL at 05:52

## 2025-03-03 RX ADMIN — ALLOPURINOL 200 MG: 100 TABLET ORAL at 11:39

## 2025-03-03 RX ADMIN — MUPIROCIN 2 %: 20 OINTMENT TOPICAL at 11:40

## 2025-03-03 RX ADMIN — ALLOPURINOL 200 MG: 100 TABLET ORAL at 17:59

## 2025-03-03 RX ADMIN — MUPIROCIN 1 UNITS: 20 OINTMENT TOPICAL at 06:00

## 2025-03-03 RX ADMIN — CEFEPIME 2 G: 2 INJECTION, POWDER, FOR SOLUTION INTRAVENOUS at 22:05

## 2025-03-03 RX ADMIN — PREDNISONE 60 MG: 50 TABLET ORAL at 17:59

## 2025-03-03 RX ADMIN — MUPIROCIN 2 %: 20 OINTMENT TOPICAL at 18:00

## 2025-03-03 RX ADMIN — CEFEPIME 2 G: 2 INJECTION, POWDER, FOR SOLUTION INTRAVENOUS at 05:56

## 2025-03-03 RX ADMIN — ALLOPURINOL 200 MG: 100 TABLET ORAL at 05:51

## 2025-03-03 RX ADMIN — FAMOTIDINE 40 MG: 20 TABLET, FILM COATED ORAL at 17:59

## 2025-03-03 RX ADMIN — CEFEPIME 2 G: 2 INJECTION, POWDER, FOR SOLUTION INTRAVENOUS at 14:04

## 2025-03-03 ASSESSMENT — PAIN DESCRIPTION - PAIN TYPE
TYPE: ACUTE PAIN
TYPE: ACUTE PAIN

## 2025-03-03 NOTE — CARE PLAN
The patient is Watcher - Medium risk of patient condition declining or worsening    Shift Goals  Clinical Goals: monitor any abnormal lab values, vs  Patient Goals: est, rest  Family Goals: No questions    Progress made toward(s) clinical / shift goals:      Problem: Neutropenia Precautions  Goal: Neutropenic precautions will be implemented and maintained for patient protection  Description: Target End Date:  Prior to discharge or change in level of care    Document on Neutropenic Precautions in Patient Education    1.  Initiate neutropenic precautions for patients with an absolute neutrophil count less than 1000 (WBC x Neutrophils + Bands)  2.  Place immunocompromised host precautions sign on door  3.  Hand hygiene for all staff visitors  4.  Avoid exposure to visitors or staff with infection  5.  No fresh flowers or plants  6.  Avoid invasive procedures (suppositories, rectal temp, daniels cath)  7.  Consult Oncologist for immunization recommendations  8.  Avoid contact with children and pets  9.  Ensure all food is cooked thoroughly, no raw or rare meat.  10. Encourage frequent oral care, change saline bottle every 24 hours  Outcome: Progressing

## 2025-03-03 NOTE — PROGRESS NOTES
Pediatric Hematology/Oncology  Daily Progress Note      Patient Name:  Tomas Jean-Baptiste  : 2000  MRN: [id number]    Location of Service:  [facility]  Date of Service: 3/3/2025  Time: 9:00 AM    Hospital Day: 4    Protocol / Treatment Plan:  Relapsed Leukemia NOS - Prednisone     SUBJECTIVE:     No acute events overnight.  Tmax 98.8 °F. Tomas Roy continued to have some night sweats but reports that this is a known side effect secondary to prednisone.  He reports otherwise feeling energy and activity have improved.  No complaints of any nausea, vomiting, diarrhea or constipation.  No complaints of abdominal discomfort or pain.  No longer complaining of any pain at the site of his CVL insertion.  No complaints of any cough, congestion, runny nose.  No complaints of any shortness of breath or difficulty breathing.  No complaints of any chest pain.  Blood sugars elevated.  Not yet conscientious of diet.  No complaints of any new skin changes or rashes.  Right fourth distal phalanx now with skin that has desquamated.  No new erythema however and no new discharge.  Continues to maintain with mupirocin and soaks.  No other concerns or complaints at this time.    Review of Systems:     Constitutional: Afebrile, feeling well overall.  Good appetite and oral intake.  HENT: Negative for auditory changes or ear pain, no nosebleeds, no congestion, no rhinorrhea, no sore throat.  No mouth sores.  Eyes: Negative for visual changes.  Respiratory: Negative for shortness of breath.  Cardiovascular: Negative.  Gastrointestinal: Negative for nausea, vomiting, abdominal pain, diarrhea, constipation.  Genitourinary: Negative.  Musculoskeletal: Negative for arm pain or leg pain.  Chest pain at surgical site.  Skin: Negative for rash or skin infection.  Neurological: Negative for numbness, tingling, sensory changes, weakness or headaches.    Endo/Heme/Allergies: No bruising/bleeding easily.   "  Psychiatric/Behavioral: Stable mood.     OBJECTIVE:     Max Temp: Temp (24hrs), Av.7 °C (98 °F), Min:36.3 °C (97.4 °F), Max:37.1 °C (98.8 °F)    Vitals: /77   Pulse 87   Temp 36.4 °C (97.5 °F) (Oral)   Resp 20   Ht 1.651 m (5' 5\")   Wt 77.7 kg (171 lb 4.8 oz)   SpO2 98%   BMI 28.51 kg/m²     I/O: No intake or output data in the 24 hours ending 25 0952    Labs:   Latest Reference Range & Units 25 00:30   WBC 4.8 - 10.8 K/uL 0.7 (LL)   RBC 4.70 - 6.10 M/uL 2.96 (L)   Hemoglobin 14.0 - 18.0 g/dL 8.4 (L)   Hematocrit 42.0 - 52.0 % 23.5 (L)   MCV 81.4 - 97.8 fL 79.4 (L)   MCH 27.0 - 33.0 pg 28.4   MCHC 32.3 - 36.5 g/dL 35.7   RDW 35.9 - 50.0 fL 33.9 (L)   Platelet Count 164 - 446 K/uL 32 (L)   MPV 9.0 - 12.9 fL 10.9   Neutrophils-Polys 44.00 - 72.00 % 0.00 (L)   Neutrophils (Absolute) 1.82 - 7.42 K/uL 0.00 (LL)   Lymphocytes 22.00 - 41.00 % 100.00 (H)   Lymphs (Absolute) 1.00 - 4.80 K/uL 0.70 (L)   Monocytes 0.00 - 13.40 % 0.00   Monos (Absolute) 0.00 - 0.85 K/uL 0.00   Eosinophils 0.00 - 6.90 % 0.00   Eos (Absolute) 0.00 - 0.51 K/uL 0.00   Basophils 0.00 - 1.80 % 0.00   Baso (Absolute) 0.00 - 0.12 K/uL 0.00   Plt Estimation  Decreased   Imm. Plt Fraction 0.6 - 13.1 % 2.7   RBC Morphology  Present   Anisocytosis  1+   Microcytosis  1+   Manual Diff Status  PERFORMED   Comment  See Comment   Sodium 135 - 145 mmol/L 140   Potassium 3.6 - 5.5 mmol/L 4.5   Chloride 96 - 112 mmol/L 110   Co2 20 - 33 mmol/L 22   Anion Gap 7.0 - 16.0  8.0   Glucose 65 - 99 mg/dL 200 (H)   Bun 8 - 22 mg/dL 11   Creatinine 0.50 - 1.40 mg/dL 0.61   GFR (CKD-EPI) >60 mL/min/1.73 m 2 138   Calcium 8.5 - 10.5 mg/dL 8.4 (L)   Correct Calcium 8.5 - 10.5 mg/dL 8.5   AST(SGOT) 12 - 45 U/L 12   ALT(SGPT) 2 - 50 U/L 12   Alkaline Phosphatase 30 - 99 U/L 77   Total Bilirubin 0.1 - 1.5 mg/dL 0.4   Albumin 3.2 - 4.9 g/dL 3.9   Total Protein 6.0 - 8.2 g/dL 6.3   Globulin 1.9 - 3.5 g/dL 2.4   A-G Ratio g/dL 1.6   Phosphorus 2.5 - " 4.5 mg/dL 3.0   Uric Acid 2.5 - 8.3 mg/dL 1.9 (L)   (LL): Data is critically low  (L): Data is abnormally low  (H): Data is abnormally high    Physical Exam:    Constitutional: Overall well appearing.  Non-toxic.  Maintains good color.  HENT: Normocephalic and atraumatic.  No rhinorrhea. Oropharynx is clear and moist.   Eyes: Conjunctivae are normal. EOMI. Non-icteric. Pupils equal and round.  Neck: Normal range of motion of neck, no adenopathy.    Cardiovascular: Regular rate, regular rhythm.  No murmur. DP/radial pulses 2+, cap refill < 2 sec.  Pulmonary/Chest: Effort normal. No respiratory distress. Symmetric expansion.  No crackles or wheezes.  Abdomen: Soft. Bowel sounds are normal. No distension and no mass. There is no hepatosplenomegaly.    Genitourinary:  Deferred.  Musculoskeletal: Normal range of motion of lower and upper extremities bilaterally.   Neurological: Alert and oriented to person and place. Exhibits normal muscle tone bilaterally in upper and lower extremities. Gait not assessed this morning. Coordination normal.    Skin: Improved erythema of right distal fourth phalanx again today does have desquamation today in the area of previous erythema.  Right forearm with Bactroban ointment, stable rash.  CVL surgical sites C/D/I  Psychiatric: Mood is stable.      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, received cranial radiation 6/5/2023 to 6/16/2023              - Testicular disease negative at diagnosis              - Several complications throughout therapy to  include severe septic shock 12/27/2022 while in Delayed Intensification              - Completed therapy 5/30/2024              - Last seen in clinic on 1/15/2025 without any evidence of relapse (clinical/laboratory)              - Reported viral URI symptoms approximately 2.5 weeks prior to presentation               - Interval resolution of viral URI symptoms followed by very acute worsening of flulike symptoms as well as aches and pains              - Seen in clinic 2/27/2025:     - WBC 1000 cells/uL, Hgb 10.6 g/dL, platelets 53,000 cells/uL               - ANC 10, , absolute monocyte count 20                 - Precipitous drop of counts over the past month with context of low-grade temperatures and bone pain most consistent with relapsed leukemia                 - Admission for Fever and Neutropenia 2/27/2025                 - Double-lumen Broviac line placement, diagnostic bone marrow evaluation to include aspirate and biopsy, flow cytometry and FISH to confirm relapse at bedside 2/28/2025  - Testicular negative at relapse  - CSF appears negative (cytology PENDING)               - Have already reached out to transplant colleagues to begin planning allogenic transplant.                 - Possible choices for reinduction include intense for drug reinduction, steroids + imatinib + blinatumomab, or blinatumomab monotherapy.     2) Fever and Neutropenia:              - ANC 10 2/27/2025              - Fever to 101.6 °F 2/27/2025              - Paronychia of the fourth distal phalanx of the right hand as possible source of infection - greatly improved since starting antibiotics              - Admission to [facility]NU 2/27/2025              - Given clinical stability, no fluid bolus administered              - Blood culture obtained peripherally (no central access) NGTD              - Ordered HSV studies on blood given HSV 1 positive titers previously as well as fourth distal phalanx paronychia NOT DETECTED               - Cefepime 2 g IV Q8 hours started empirically              - Tylenol 650 mg PO Q6 hours for fever              - Supportive care of symptoms     3) Paronychia: (IMPROVED)              - Paronychia of the right fourth distal phalanx with intense erythema              - 3-4 x daily warm water bath soaks              - Mupirocin ointment topically              - Cefepime as above              - Bloodstream without evidence of HSV infection     4) 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:              - Continue hydration with normal saline               - Allopurinol 200 mg PO TID              - Daily TLS labs      - K+ 4.5, Uric Acid 1.9, Phos 3.0, Ca++ 8.4 today    5) Disease Related Pancytopenia:              -  cells/uL, Hgb 8.4 g/dL, platelets 32,000 cells/uL              - ANC 0, , absolute monocyte count 0              - Transfuse irradiated PRBC for Hgb<7 g/dL or symptomatic              - Transfuse irradiated platelets for platelet count of <10,000 cells/uL or symptomatic                 - No transfusion today   - CBC daily     6) Vascular Access:              - None currently              - Double-lumen CVL placed 2/28/2025     7) Psychosocial:   - Dr. Ortega seeing     8) Social:              - Referred  to Social Work and financial navigator     Disposition: Inpatient for fever and neutropenia, workup of presumed relapsed disease, referral to transplant    Pepe Faye MD  Pediatric Hematology / Oncology  [facility]  Cell.  [phone]  Office. [phone]

## 2025-03-03 NOTE — PROGRESS NOTES
"Pediatric Hematology/Oncology  Daily Progress Note      Patient Name:  Tomas Jean-Baptiste  : 2000  MRN: [id number]    Location of Service:  [facility]  Date of Service: 3/2/2025  Time: 9:00 AM    Hospital Day: 3    Protocol / Treatment Plan:  Relapsed Leukemia NOS - Prednisone     SUBJECTIVE:     No acute events overnight.  Afebrile with Tmax 98.8F.  No longer complaining of chest/shoulder pain.  No headaches.  Tomas Roy has not had any neurologic status changes.  Tomas Roy reports that he is overall feeling well.  Energy and activity are at baseline.  No complaints of any nausea, vomiting, diarrhea or constipation.  No abdominal discomfort or pain.  Tomas Roy hasn't had any bleeding or bruising. Rash on forearm and and right 4th phalanx is improving.  No other concerns or complaints.    Review of Systems:     Constitutional: Afebrile, feeling better today.   HENT: Negative for auditory changes or ear pain, no nosebleeds, no congestion, no rhinorrhea, no sore throat.  No mouth sores.  Eyes: Negative for visual changes.  Respiratory: Negative for shortness of breath.  Cardiovascular: Negative.  Gastrointestinal: Negative for nausea, vomiting, abdominal pain, diarrhea, constipation.  Genitourinary: Negative.  Musculoskeletal: Negative for arm pain or leg pain.  Chest pain at surgical site.  Skin: Negative for rash or skin infection.  Neurological: Negative for numbness, tingling, sensory changes, weakness or headaches.    Endo/Heme/Allergies: No bruising/bleeding easily.    Psychiatric/Behavioral: Stable mood.     OBJECTIVE:     Max Temp: Temp (24hrs), Av.7 °C (98 °F), Min:36.3 °C (97.4 °F), Max:37.1 °C (98.8 °F)    Vitals: /56   Pulse 77   Temp 37.1 °C (98.8 °F) (Oral)   Resp 18   Ht 1.651 m (5' 5\")   Wt 77.7 kg (171 lb 4.8 oz)   SpO2 96%   BMI 28.51 kg/m²     I/O: No intake or output data in the 24 hours ending 25 4247    Labs:   Latest " Reference Range & Units 03/02/25 00:00   WBC 4.8 - 10.8 K/uL 0.4 (LL)   RBC 4.70 - 6.10 M/uL 3.26 (L)   Hemoglobin 14.0 - 18.0 g/dL 9.0 (L)   Hematocrit 42.0 - 52.0 % 26.5 (L)   MCV 81.4 - 97.8 fL 81.3 (L)   MCH 27.0 - 33.0 pg 27.6   MCHC 32.3 - 36.5 g/dL 34.0   RDW 35.9 - 50.0 fL 34.5 (L)   Platelet Count 164 - 446 K/uL 37 (L)   MPV 9.0 - 12.9 fL 11.3   Neutrophils-Polys 44.00 - 72.00 % 0.00 (L)   Neutrophils (Absolute) 1.82 - 7.42 K/uL 0.00 (LL)   Lymphocytes 22.00 - 41.00 % 97.40 (H)   Lymphs (Absolute) 1.00 - 4.80 K/uL 0.39 (L)   Monocytes 0.00 - 13.40 % 0.00   Monos (Absolute) 0.00 - 0.85 K/uL 0.00   Eosinophils 0.00 - 6.90 % 0.00   Eos (Absolute) 0.00 - 0.51 K/uL 0.00   Basophils 0.00 - 1.80 % 0.00   Baso (Absolute) 0.00 - 0.12 K/uL 0.00   Blasts % 2.60 (HH)   Plt Estimation  Decreased   Imm. Plt Fraction 0.6 - 13.1 % 5.6   RBC Morphology  Present   Anisocytosis  1+   Microcytosis  2+ !   Manual Diff Status  PERFORMED   Sodium 135 - 145 mmol/L 136   Potassium 3.6 - 5.5 mmol/L 4.3   Chloride 96 - 112 mmol/L 106   Co2 20 - 33 mmol/L 20   Anion Gap 7.0 - 16.0  10.0   Glucose 65 - 99 mg/dL 169 (H)   Bun 8 - 22 mg/dL 12   Creatinine 0.50 - 1.40 mg/dL 0.57   GFR (CKD-EPI) >60 mL/min/1.73 m 2 141   Calcium 8.5 - 10.5 mg/dL 8.7   Correct Calcium 8.5 - 10.5 mg/dL 8.7   AST(SGOT) 12 - 45 U/L 18   ALT(SGPT) 2 - 50 U/L 15   Alkaline Phosphatase 30 - 99 U/L 89   Total Bilirubin 0.1 - 1.5 mg/dL 0.4   Albumin 3.2 - 4.9 g/dL 4.0   Total Protein 6.0 - 8.2 g/dL 6.9   Globulin 1.9 - 3.5 g/dL 2.9   A-G Ratio g/dL 1.4   Phosphorus 2.5 - 4.5 mg/dL 3.7   Uric Acid 2.5 - 8.3 mg/dL 3.1   (LL): Data is critically low  (HH): Data is critically high  (L): Data is abnormally low  (H): Data is abnormally high  !: Data is abnormal    Physical Exam:    Constitutional: Overall well appearing.  Non-toxic.  Maintains good color.  HENT: Normocephalic and atraumatic.  No rhinorrhea. Oropharynx is clear and moist.   Eyes: Conjunctivae are normal.  EOMI. Non-icteric. Pupils equal and round.  Neck: Normal range of motion of neck, no adenopathy.    Cardiovascular: Regular rate, regular rhythm.  No murmur. DP/radial pulses 2+, cap refill < 2 sec.  Pulmonary/Chest: Effort normal. No respiratory distress. Symmetric expansion.  No crackles or wheezes.  Abdomen: Soft. Bowel sounds are normal. No distension and no mass. There is no hepatosplenomegaly.    Genitourinary:  Deferred.  Musculoskeletal: Normal range of motion of lower and upper extremities bilaterally.   Neurological: Alert and oriented to person and place. Exhibits normal muscle tone bilaterally in upper and lower extremities. Gait normal. Coordination normal.    Skin: Improved erythema of right distal fourth phalanx again today.  Right forearm with Bactroban ointment, stable rash.  Psychiatric: Mood is stable.      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, received cranial radiation 6/5/2023 to 6/16/2023              - Testicular disease negative at diagnosis              - Several complications throughout therapy to include severe septic shock 12/27/2022 while in Delayed Intensification              - Completed therapy 5/30/2024              - Last seen in clinic on 1/15/2025 without any evidence of relapse (clinical/laboratory)              - Reported viral URI symptoms approximately 2.5 weeks prior to presentation               - Interval resolution of viral URI symptoms followed by very acute worsening of flulike symptoms as well as aches and pains              - Seen in  clinic 2/27/2025:     - WBC 1000 cells/uL, Hgb 10.6 g/dL, platelets 53,000 cells/uL               - ANC 10, , absolute monocyte count 20                 - Precipitous drop of counts over the past month with context of low-grade temperatures and bone pain most consistent with relapsed leukemia                 - Admission for Fever and Neutropenia 2/27/2025                 - Double-lumen Broviac line placement, diagnostic bone marrow evaluation to include aspirate and biopsy, flow cytometry and FISH to confirm relapse at bedside 2/28/2025  - Testicular negative at relapse  - CSF appears negative (cytology PENDING)               - Have already reached out to transplant colleagues to begin planning allogenic transplant.                 - Possible choices for reinduction include intense for drug reinduction, steroids + imatinib + blinatumomab, or blinatumomab monotherapy.     2) Fever and Neutropenia:              - ANC 10 2/27/2025              - Fever to 101.6 °F 2/27/2025              - Paronychia of the fourth distal phalanx of the right hand as possible source of infection - greatly improved since starting antibiotics              - Admission to CNU 2/27/2025              - Given clinical stability, no bolus administered              - Blood culture obtained peripherally (no central access) NGTD              - Ordered HSV studies on blood given HSV 1 positive titers previously as well as fourth distal phalanx paronychia PENDING              - Cefepime 2 g IV Q8 hours started empirically              - Tylenol 650 mg PO Q6 hours for fever              - Supportive care of symptoms     3) Paronychia: (IMPROVED)              - Paronychia of the right fourth distal phalanx with intense erythema              - 3-4 x daily warm water bath soaks              - Mupirocin ointment topically              - Cefepime as above              - Does have HSV1 positive titers, we will have to monitor for improvement rather  than worsening to exclude possibility of herpetic tommy.  Currently without fluid to be swabbed.     4) 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:              - Continue hydration with normal saline               - Allopurinol 200 mg PO TID              - Daily TLS labs      - K+ 4.3, Uric Acid 3.1, Phos 3.7, Ca++ 8.7 today    5) Disease Related Pancytopenia:              -  cells/uL, Hgb 9.0 g/dL, platelets 37,000 cells/uL              - ANC 0, , absolute monocyte count 0              - Transfuse irradiated PRBC for Hgb<7 g/dL or symptomatic              - Transfuse irradiated platelets for platelet count of <10,000 cells/uL or symptomatic                 - No transfusion today   - CBC daily     6) Vascular Access:              - None currently              - Double-lumen CVL placed 2/28/2025      7) Social:              - Referred  to Social Work and financial navigator     Disposition: Inpatient for fever and neutropenia, workup of presumed relapsed disease, referral to transplant    Pepe Faye MD  Pediatric Hematology / Oncology  [facility].  [phone]  [facility] [phone]

## 2025-03-04 LAB
ACUTE LEUKEMIA MARKERS SPEC-IMP: NORMAL
ALBUMIN SERPL BCP-MCNC: 3.7 G/DL (ref 3.2–4.9)
ALBUMIN/GLOB SERPL: 1.5 G/DL
ALP SERPL-CCNC: 74 U/L (ref 30–99)
ALT SERPL-CCNC: 10 U/L (ref 2–50)
ANION GAP SERPL CALC-SCNC: 9 MMOL/L (ref 7–16)
ANISOCYTOSIS BLD QL SMEAR: ABNORMAL
AST SERPL-CCNC: 10 U/L (ref 12–45)
BACTERIA BLD CULT: NORMAL
BACTERIA BLD CULT: NORMAL
BASOPHILS # BLD AUTO: 0 % (ref 0–1.8)
BASOPHILS # BLD: 0 K/UL (ref 0–0.12)
BILIRUB SERPL-MCNC: 0.4 MG/DL (ref 0.1–1.5)
BUN SERPL-MCNC: 12 MG/DL (ref 8–22)
CALCIUM ALBUM COR SERPL-MCNC: 8.3 MG/DL (ref 8.5–10.5)
CALCIUM SERPL-MCNC: 8.1 MG/DL (ref 8.5–10.5)
CHLORIDE SERPL-SCNC: 108 MMOL/L (ref 96–112)
CHROM ANALY INTERPHASE BLD/MAR FISH-IMP: NORMAL
CO2 SERPL-SCNC: 23 MMOL/L (ref 20–33)
CREAT SERPL-MCNC: 0.57 MG/DL (ref 0.5–1.4)
EOSINOPHIL # BLD AUTO: 0 K/UL (ref 0–0.51)
EOSINOPHIL NFR BLD: 0 % (ref 0–6.9)
ERYTHROCYTE [DISTWIDTH] IN BLOOD BY AUTOMATED COUNT: 33.5 FL (ref 35.9–50)
EVENTS COUNTED SPEC: 39 MARKERS
GFR SERPLBLD CREATININE-BSD FMLA CKD-EPI: 141 ML/MIN/1.73 M 2
GLOBULIN SER CALC-MCNC: 2.4 G/DL (ref 1.9–3.5)
GLUCOSE SERPL-MCNC: 125 MG/DL (ref 65–99)
HCT VFR BLD AUTO: 22 % (ref 42–52)
HGB BLD-MCNC: 7.9 G/DL (ref 14–18)
LYMPHOCYTES # BLD AUTO: 1.05 K/UL (ref 1–4.8)
LYMPHOCYTES NFR BLD: 95.7 % (ref 22–41)
MANUAL DIFF BLD: ABNORMAL
MCH RBC QN AUTO: 28.3 PG (ref 27–33)
MCHC RBC AUTO-ENTMCNC: 35.9 G/DL (ref 32.3–36.5)
MCV RBC AUTO: 78.9 FL (ref 81.4–97.8)
MICROCYTES BLD QL SMEAR: ABNORMAL
MONOCYTES # BLD AUTO: 0.03 K/UL (ref 0–0.85)
MONOCYTES NFR BLD AUTO: 2.6 % (ref 0–13.4)
NEUTROPHILS # BLD AUTO: 0.02 K/UL (ref 1.82–7.42)
NEUTROPHILS NFR BLD: 1.7 % (ref 44–72)
PATHOLOGY STUDY: NORMAL
PHOSPHATE SERPL-MCNC: 3.7 MG/DL (ref 2.5–4.5)
PLATELET # BLD AUTO: 20 K/UL (ref 164–446)
PLATELET BLD QL SMEAR: NORMAL
PLATELETS.RETICULATED NFR BLD AUTO: 2.3 % (ref 0.6–13.1)
PMV BLD AUTO: 11.2 FL (ref 9–12.9)
POTASSIUM SERPL-SCNC: 4.1 MMOL/L (ref 3.6–5.5)
PROT SERPL-MCNC: 6.1 G/DL (ref 6–8.2)
RBC # BLD AUTO: 2.79 M/UL (ref 4.7–6.1)
RBC BLD AUTO: PRESENT
SIGNIFICANT IND 70042: NORMAL
SIGNIFICANT IND 70042: NORMAL
SITE SITE: NORMAL
SITE SITE: NORMAL
SODIUM SERPL-SCNC: 140 MMOL/L (ref 135–145)
SOURCE 9121: NORMAL
SOURCE SOURCE: NORMAL
SOURCE SOURCE: NORMAL
URATE SERPL-MCNC: 2 MG/DL (ref 2.5–8.3)
WBC # BLD AUTO: 1.1 K/UL (ref 4.8–10.8)

## 2025-03-04 PROCEDURE — 84550 ASSAY OF BLOOD/URIC ACID: CPT

## 2025-03-04 PROCEDURE — 700102 HCHG RX REV CODE 250 W/ 637 OVERRIDE(OP): Performed by: PEDIATRICS

## 2025-03-04 PROCEDURE — 85027 COMPLETE CBC AUTOMATED: CPT

## 2025-03-04 PROCEDURE — 99233 SBSQ HOSP IP/OBS HIGH 50: CPT | Performed by: PEDIATRICS

## 2025-03-04 PROCEDURE — A9270 NON-COVERED ITEM OR SERVICE: HCPCS | Performed by: PEDIATRICS

## 2025-03-04 PROCEDURE — 700111 HCHG RX REV CODE 636 W/ 250 OVERRIDE (IP): Performed by: PEDIATRICS

## 2025-03-04 PROCEDURE — 84100 ASSAY OF PHOSPHORUS: CPT

## 2025-03-04 PROCEDURE — 85055 RETICULATED PLATELET ASSAY: CPT

## 2025-03-04 PROCEDURE — 700105 HCHG RX REV CODE 258: Performed by: PEDIATRICS

## 2025-03-04 PROCEDURE — 80053 COMPREHEN METABOLIC PANEL: CPT

## 2025-03-04 PROCEDURE — 85007 BL SMEAR W/DIFF WBC COUNT: CPT

## 2025-03-04 PROCEDURE — 770004 HCHG ROOM/CARE - ONCOLOGY PRIVATE *

## 2025-03-04 RX ADMIN — ALLOPURINOL 200 MG: 100 TABLET ORAL at 05:08

## 2025-03-04 RX ADMIN — ALLOPURINOL 200 MG: 100 TABLET ORAL at 12:01

## 2025-03-04 RX ADMIN — CEFEPIME 2 G: 2 INJECTION, POWDER, FOR SOLUTION INTRAVENOUS at 21:50

## 2025-03-04 RX ADMIN — ALLOPURINOL 200 MG: 100 TABLET ORAL at 17:29

## 2025-03-04 RX ADMIN — CEFEPIME 2 G: 2 INJECTION, POWDER, FOR SOLUTION INTRAVENOUS at 14:31

## 2025-03-04 RX ADMIN — SODIUM CHLORIDE: 9 INJECTION, SOLUTION INTRAVENOUS at 11:18

## 2025-03-04 RX ADMIN — MUPIROCIN 2 %: 20 OINTMENT TOPICAL at 17:29

## 2025-03-04 RX ADMIN — PREDNISONE 60 MG: 50 TABLET ORAL at 05:08

## 2025-03-04 RX ADMIN — PREDNISONE 60 MG: 50 TABLET ORAL at 17:28

## 2025-03-04 RX ADMIN — MUPIROCIN 2 %: 20 OINTMENT TOPICAL at 05:10

## 2025-03-04 RX ADMIN — FAMOTIDINE 40 MG: 20 TABLET, FILM COATED ORAL at 05:09

## 2025-03-04 RX ADMIN — CEFEPIME 2 G: 2 INJECTION, POWDER, FOR SOLUTION INTRAVENOUS at 05:08

## 2025-03-04 RX ADMIN — MUPIROCIN 2 %: 20 OINTMENT TOPICAL at 11:18

## 2025-03-04 RX ADMIN — SODIUM CHLORIDE: 9 INJECTION, SOLUTION INTRAVENOUS at 17:31

## 2025-03-04 RX ADMIN — FAMOTIDINE 40 MG: 20 TABLET, FILM COATED ORAL at 17:28

## 2025-03-04 ASSESSMENT — PAIN DESCRIPTION - PAIN TYPE
TYPE: ACUTE PAIN
TYPE: ACUTE PAIN

## 2025-03-04 NOTE — PROGRESS NOTES
"Pediatric Hematology/Oncology  Daily Progress Note      Patient Name:  Tomas Jean-Baptiste  : 2000  MRN: [id number]    Location of Service:  [facility]  Date of Service: 3/4/2025  Time: 9:00 AM    Hospital Day: 5    Protocol / Treatment Plan:  Relapsed Leukemia NOS - Prednisone     SUBJECTIVE:     No acute events overnight.  Afebrile 98.8 °F.  Not complaining of any headaches, changes in vision or neurologic status changes.  Still has some sweating at night given steroids.  Not complaining of any shortness of breath, difficulty breathing or chest pain.  Denies any nausea, vomiting, diarrhea or constipation.  No abdominal discomfort or pain.  No complaints of any skin changes or rashes.  Both the forearm lesion as well as fourth distal phalanx has improved considerably since starting antibiotics.  Energy and activity are good.  No other concerns or complaints at this time.    Review of Systems:     Constitutional: Afebrile, feeling well overall.  Good appetite and oral intake.  HENT: Negative for auditory changes or ear pain, no nosebleeds, no congestion, no rhinorrhea, no sore throat.  No mouth sores.  Eyes: Negative for visual changes.  Respiratory: Negative for shortness of breath.  Cardiovascular: Negative.  Gastrointestinal: Negative for nausea, vomiting, abdominal pain, diarrhea, constipation.  Genitourinary: Negative.  Musculoskeletal: Negative for arm pain or leg pain.  Chest pain at surgical site nearly resolved.  Skin: Negative for rash or skin infection.  Neurological: Negative for numbness, tingling, sensory changes, weakness or headaches.    Endo/Heme/Allergies: No bruising/bleeding easily.    Psychiatric/Behavioral: Stable mood.     OBJECTIVE:     Max Temp: Temp (24hrs), Av.7 °C (98 °F), Min:36.3 °C (97.4 °F), Max:37.1 °C (98.8 °F)    Vitals: /72   Pulse 66   Temp 36.5 °C (97.7 °F) (Temporal)   Resp 16   Ht 1.651 m (5' 5\")   Wt 77.7 kg (171 lb 4.8 oz)  "  SpO2 98%   BMI 28.51 kg/m²     I/O:   Intake/Output Summary (Last 24 hours) at 3/4/2025 1412  Last data filed at 3/3/2025 1906  Gross per 24 hour   Intake 240 ml   Output --   Net 240 ml       Labs:     Latest Reference Range & Units 03/04/25 05:05   WBC 4.8 - 10.8 K/uL 1.1 (LL)   RBC 4.70 - 6.10 M/uL 2.79 (L)   Hemoglobin 14.0 - 18.0 g/dL 7.9 (L)   Hematocrit 42.0 - 52.0 % 22.0 (L)   MCV 81.4 - 97.8 fL 78.9 (L)   MCH 27.0 - 33.0 pg 28.3   MCHC 32.3 - 36.5 g/dL 35.9   RDW 35.9 - 50.0 fL 33.5 (L)   Platelet Count 164 - 446 K/uL 20 (L)   MPV 9.0 - 12.9 fL 11.2   Neutrophils-Polys 44.00 - 72.00 % 1.70 (L)   Neutrophils (Absolute) 1.82 - 7.42 K/uL 0.02 (LL)   Lymphocytes 22.00 - 41.00 % 95.70 (H)   Lymphs (Absolute) 1.00 - 4.80 K/uL 1.05   Monocytes 0.00 - 13.40 % 2.60   Monos (Absolute) 0.00 - 0.85 K/uL 0.03   Eosinophils 0.00 - 6.90 % 0.00   Eos (Absolute) 0.00 - 0.51 K/uL 0.00   Basophils 0.00 - 1.80 % 0.00   Baso (Absolute) 0.00 - 0.12 K/uL 0.00   Plt Estimation  SEE BELOW   Imm. Plt Fraction 0.6 - 13.1 % 2.3   RBC Morphology  Present   Anisocytosis  2+ !   Microcytosis  2+ !   Manual Diff Status  PERFORMED   Sodium 135 - 145 mmol/L 140   Potassium 3.6 - 5.5 mmol/L 4.1   Chloride 96 - 112 mmol/L 108   Co2 20 - 33 mmol/L 23   Anion Gap 7.0 - 16.0  9.0   Glucose 65 - 99 mg/dL 125 (H)   Bun 8 - 22 mg/dL 12   Creatinine 0.50 - 1.40 mg/dL 0.57   GFR (CKD-EPI) >60 mL/min/1.73 m 2 141   Calcium 8.5 - 10.5 mg/dL 8.1 (L)   Correct Calcium 8.5 - 10.5 mg/dL 8.3 (L)   AST(SGOT) 12 - 45 U/L 10 (L)   ALT(SGPT) 2 - 50 U/L 10   Alkaline Phosphatase 30 - 99 U/L 74   Total Bilirubin 0.1 - 1.5 mg/dL 0.4   Albumin 3.2 - 4.9 g/dL 3.7   Total Protein 6.0 - 8.2 g/dL 6.1   Globulin 1.9 - 3.5 g/dL 2.4   A-G Ratio g/dL 1.5   Phosphorus 2.5 - 4.5 mg/dL 3.7   Uric Acid 2.5 - 8.3 mg/dL 2.0 (L)   (LL): Data is critically low  (L): Data is abnormally low  (H): Data is abnormally high  !: Data is abnormal    Physical Exam:    Constitutional:  Overall well appearing.  Non-toxic.  Much better looking.  HENT: Normocephalic and atraumatic.  No rhinorrhea. Oropharynx is clear and moist.   Eyes: Conjunctivae are normal. EOMI. Non-icteric. Pupils equal and round.  Neck: Normal range of motion of neck, no adenopathy.    Cardiovascular: Regular rate, regular rhythm.  No murmur. DP/radial pulses 2+, cap refill < 2 sec.  Pulmonary/Chest: Effort normal. No respiratory distress. Symmetric expansion.  No crackles or wheezes.  Abdomen: Soft. Bowel sounds are normal. No distension and no mass. There is no hepatosplenomegaly.    Genitourinary:  Deferred.  Musculoskeletal: Normal range of motion of lower and upper extremities bilaterally.   Neurological: Alert and oriented to person and place. Exhibits normal muscle tone bilaterally in upper and lower extremities. Gait not assessed this morning. Coordination normal.    Skin: Improved erythema of right distal fourth phalanx again today does have desquamation today in the area of previous erythema.  Right forearm with Bactroban ointment, stable rash.  CVL surgical sites C/D/I  Psychiatric: Mood is stable.      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, received cranial radiation 6/5/2023 to 6/16/2023              - Testicular disease negative at diagnosis              - Several complications throughout therapy to include severe septic shock 12/27/2022 while in Delayed Intensification              - Completed therapy 5/30/2024              - Last seen in clinic on 1/15/2025 without any evidence  of relapse (clinical/laboratory)              - Reported viral URI symptoms approximately 2.5 weeks prior to presentation               - Interval resolution of viral URI symptoms followed by very acute worsening of flulike symptoms as well as aches and pains              - Seen in clinic 2/27/2025:     - WBC 1000 cells/uL, Hgb 10.6 g/dL, platelets 53,000 cells/uL               - ANC 10, , absolute monocyte count 20                 - Precipitous drop of counts over the past month with context of low-grade temperatures and bone pain most consistent with relapsed leukemia                 - Admission for Fever and Neutropenia 2/27/2025                 - Double-lumen Broviac line placement, diagnostic bone marrow evaluation to include aspirate and biopsy, flow cytometry and FISH to confirm relapse at bedside 2/28/2025  - Testicular negative at relapse  - CSF negative consistent with CNS1              - Have already reached out to transplant colleagues to begin planning allogenic transplant.                 - Possible choices for reinduction include intense for drug reinduction, steroids + imatinib + blinatumomab, or blinatumomab monotherapy.     2) Fever and Neutropenia:              - ANC 10 2/27/2025              - Fever to 101.6 °F 2/27/2025              - Paronychia of the fourth distal phalanx of the right hand as possible source of infection - greatly improved since starting antibiotics              - Admission to CNU 2/27/2025              - Given clinical stability, no fluid bolus administered              - Blood culture obtained peripherally (no central access) NGTD              - Ordered HSV studies on blood given HSV 1 positive titers previously as well as fourth distal phalanx paronychia NOT DETECTED              - Cefepime 2 g IV Q8 hours started empirically              - Tylenol 650 mg PO Q6 hours for fever              - Supportive care of symptoms     3) Paronychia: (IMPROVED)              -  Paronychia of the right fourth distal phalanx with intense erythema              - 3-4 x daily warm water bath soaks              - Mupirocin ointment topically              - Cefepime as above              - Bloodstream without evidence of HSV infection     4) 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:              - Continue hydration with normal saline               - Allopurinol 200 mg PO TID              - Daily TLS labs      - K+ 4.1, Uric Acid 2.0, Phos 3.7, Ca++ 8.3 today    5) Disease Related Pancytopenia:              - WBC 1100 cells/uL, Hgb 7.9 g/dL, platelets 20,000 cells/uL              - ANC 20, ALC 1050, absolute monocyte count 30              - Transfuse irradiated PRBC for Hgb<7 g/dL or symptomatic              - Transfuse irradiated platelets for platelet count of <10,000 cells/uL or symptomatic                 - No transfusion today   - CBC daily     6) Vascular Access:              - None currently              - Double-lumen CVL placed 2/28/2025     7) Psychosocial:   - Dr. Ortega seeing     8) Social:              - Referred  to Social Work and financial navigator     Disposition: Inpatient for fever and neutropenia, workup of presumed relapsed disease, referral to transplant    Pepe Faye MD  Pediatric Hematology / Oncology  [facility]  Cell.  [phone]  [facility]. [phone]

## 2025-03-04 NOTE — CARE PLAN
The patient is Stable - Low risk of patient condition declining or worsening    Shift Goals  Clinical Goals: Patient will remain free from S/S of infection this shift.  Patient Goals: Updates from Dr. Faye  Family Goals: Not present    Progress made toward(s) clinical / shift goals:    Problem: Knowledge Deficit - Standard  Goal: Patient and family/care givers will demonstrate understanding of plan of care, disease process/condition, diagnostic tests and medications  Outcome: Progressing  Note: Discussed POC and shift goals with patient and primary team. All questions answered as able. Dr. Faye to bedside with patient     Problem: Pain - Standard  Goal: Alleviation of pain or a reduction in pain to the patient’s comfort goal  Outcome: Progressing  Note: Patient reports mild soreness in chest at bajwa site. Managed well with heat packs. Patient denies need for PRN medication at this time.      Problem: Neutropenia Precautions  Goal: Neutropenic precautions will be implemented and maintained for patient protection  Outcome: Progressing  Note: Neutropenic precautions in place-  Hand hygiene for all staff visitors  Avoid exposure to visitors or staff with infection   No fresh flowers or plants   Avoid invasive procedures (suppositories, rectal temp, daniels cath)   Ensure all food is cooked thoroughly, no raw or rare meat.   Patient remains afebrile this shift.        Patient is not progressing towards the following goals: NA

## 2025-03-04 NOTE — CARE PLAN
The patient is Stable - Low risk of patient condition declining or worsening    Shift Goals  Clinical Goals: Patient will remain free from S/S of infection this shift.  Patient Goals: Updates with Dr. Faye  Family Goals: Not present    Progress made toward(s) clinical / shift goals:    Problem: Knowledge Deficit - Standard  Goal: Patient and family/care givers will demonstrate understanding of plan of care, disease process/condition, diagnostic tests and medications  Outcome: Progressing  Note: Discussed POC and shift goals with patient and primary team. All questions answered as able. Dr. Faye to bedside for updates     Problem: Neutropenia Precautions  Goal: Neutropenic precautions will be implemented and maintained for patient protection  Outcome: Progressing  Note: Neutropenic precautions in place-  Hand hygiene for all staff visitors  Avoid exposure to visitors or staff with infection   No fresh flowers or plants   Avoid invasive procedures (suppositories, rectal temp, daniels cath)   Ensure all food is cooked thoroughly, no raw or rare meat.   Patient remains afebrile this shift.        Patient is not progressing towards the following goals: NA

## 2025-03-05 LAB
ALBUMIN SERPL BCP-MCNC: 3.6 G/DL (ref 3.2–4.9)
ALBUMIN/GLOB SERPL: 1.6 G/DL
ALP SERPL-CCNC: 69 U/L (ref 30–99)
ALT SERPL-CCNC: 10 U/L (ref 2–50)
ANION GAP SERPL CALC-SCNC: 10 MMOL/L (ref 7–16)
AST SERPL-CCNC: 10 U/L (ref 12–45)
BASOPHILS # BLD AUTO: 0 % (ref 0–1.8)
BASOPHILS # BLD: 0 K/UL (ref 0–0.12)
BILIRUB SERPL-MCNC: 0.3 MG/DL (ref 0.1–1.5)
BUN SERPL-MCNC: 12 MG/DL (ref 8–22)
CALCIUM ALBUM COR SERPL-MCNC: 7.8 MG/DL (ref 8.5–10.5)
CALCIUM SERPL-MCNC: 7.5 MG/DL (ref 8.5–10.5)
CHLORIDE SERPL-SCNC: 109 MMOL/L (ref 96–112)
CO2 SERPL-SCNC: 20 MMOL/L (ref 20–33)
COMMENT NL1176: ABNORMAL
CREAT SERPL-MCNC: 0.53 MG/DL (ref 0.5–1.4)
EOSINOPHIL # BLD AUTO: 0 K/UL (ref 0–0.51)
EOSINOPHIL NFR BLD: 0 % (ref 0–6.9)
ERYTHROCYTE [DISTWIDTH] IN BLOOD BY AUTOMATED COUNT: 33.9 FL (ref 35.9–50)
GFR SERPLBLD CREATININE-BSD FMLA CKD-EPI: 144 ML/MIN/1.73 M 2
GLOBULIN SER CALC-MCNC: 2.2 G/DL (ref 1.9–3.5)
GLUCOSE SERPL-MCNC: 133 MG/DL (ref 65–99)
HCT VFR BLD AUTO: 21.4 % (ref 42–52)
HGB BLD-MCNC: 7.6 G/DL (ref 14–18)
HOLDING TUBE BB 8507: NORMAL
LYMPHOCYTES # BLD AUTO: 0.68 K/UL (ref 1–4.8)
LYMPHOCYTES NFR BLD: 97.5 % (ref 22–41)
MANUAL DIFF BLD: ABNORMAL
MCH RBC QN AUTO: 28.1 PG (ref 27–33)
MCHC RBC AUTO-ENTMCNC: 35.5 G/DL (ref 32.3–36.5)
MCV RBC AUTO: 79.3 FL (ref 81.4–97.8)
MICROCYTES BLD QL SMEAR: ABNORMAL
MONOCYTES # BLD AUTO: 0.01 K/UL (ref 0–0.85)
MONOCYTES NFR BLD AUTO: 1.7 % (ref 0–13.4)
NEUTROPHILS # BLD AUTO: 0.01 K/UL (ref 1.82–7.42)
NEUTROPHILS NFR BLD: 0.8 % (ref 44–72)
PLATELET # BLD AUTO: 15 K/UL (ref 164–446)
PLATELET BLD QL SMEAR: NORMAL
PLATELETS.RETICULATED NFR BLD AUTO: 2.8 % (ref 0.6–13.1)
PMV BLD AUTO: 11.4 FL (ref 9–12.9)
POTASSIUM SERPL-SCNC: 4 MMOL/L (ref 3.6–5.5)
PROT SERPL-MCNC: 5.8 G/DL (ref 6–8.2)
RBC # BLD AUTO: 2.7 M/UL (ref 4.7–6.1)
RBC BLD AUTO: PRESENT
SODIUM SERPL-SCNC: 139 MMOL/L (ref 135–145)
WBC # BLD AUTO: 0.7 K/UL (ref 4.8–10.8)

## 2025-03-05 PROCEDURE — 80053 COMPREHEN METABOLIC PANEL: CPT

## 2025-03-05 PROCEDURE — 85007 BL SMEAR W/DIFF WBC COUNT: CPT

## 2025-03-05 PROCEDURE — 85027 COMPLETE CBC AUTOMATED: CPT

## 2025-03-05 PROCEDURE — 99233 SBSQ HOSP IP/OBS HIGH 50: CPT | Performed by: PEDIATRICS

## 2025-03-05 PROCEDURE — 700102 HCHG RX REV CODE 250 W/ 637 OVERRIDE(OP): Performed by: PEDIATRICS

## 2025-03-05 PROCEDURE — 770004 HCHG ROOM/CARE - ONCOLOGY PRIVATE *

## 2025-03-05 PROCEDURE — 85055 RETICULATED PLATELET ASSAY: CPT

## 2025-03-05 PROCEDURE — A9270 NON-COVERED ITEM OR SERVICE: HCPCS | Performed by: PEDIATRICS

## 2025-03-05 PROCEDURE — 36415 COLL VENOUS BLD VENIPUNCTURE: CPT

## 2025-03-05 PROCEDURE — 700111 HCHG RX REV CODE 636 W/ 250 OVERRIDE (IP): Performed by: PEDIATRICS

## 2025-03-05 PROCEDURE — 700105 HCHG RX REV CODE 258: Performed by: PEDIATRICS

## 2025-03-05 RX ADMIN — PREDNISONE 60 MG: 50 TABLET ORAL at 05:33

## 2025-03-05 RX ADMIN — CEFEPIME 2 G: 2 INJECTION, POWDER, FOR SOLUTION INTRAVENOUS at 14:38

## 2025-03-05 RX ADMIN — CEFEPIME 2 G: 2 INJECTION, POWDER, FOR SOLUTION INTRAVENOUS at 05:34

## 2025-03-05 RX ADMIN — SODIUM CHLORIDE: 9 INJECTION, SOLUTION INTRAVENOUS at 00:18

## 2025-03-05 RX ADMIN — ALLOPURINOL 200 MG: 100 TABLET ORAL at 05:34

## 2025-03-05 RX ADMIN — SODIUM CHLORIDE: 9 INJECTION, SOLUTION INTRAVENOUS at 07:18

## 2025-03-05 RX ADMIN — ACETAMINOPHEN 650 MG: 325 TABLET ORAL at 14:45

## 2025-03-05 RX ADMIN — FAMOTIDINE 40 MG: 20 TABLET, FILM COATED ORAL at 05:34

## 2025-03-05 RX ADMIN — FAMOTIDINE 40 MG: 20 TABLET, FILM COATED ORAL at 16:54

## 2025-03-05 RX ADMIN — PREDNISONE 60 MG: 50 TABLET ORAL at 16:54

## 2025-03-05 RX ADMIN — ALLOPURINOL 200 MG: 100 TABLET ORAL at 16:54

## 2025-03-05 RX ADMIN — SODIUM CHLORIDE: 9 INJECTION, SOLUTION INTRAVENOUS at 23:28

## 2025-03-05 RX ADMIN — ALLOPURINOL 200 MG: 100 TABLET ORAL at 12:43

## 2025-03-05 RX ADMIN — MUPIROCIN 1 G: 20 OINTMENT TOPICAL at 04:03

## 2025-03-05 RX ADMIN — MUPIROCIN 2 %: 20 OINTMENT TOPICAL at 16:54

## 2025-03-05 RX ADMIN — CEFEPIME 2 G: 2 INJECTION, POWDER, FOR SOLUTION INTRAVENOUS at 21:13

## 2025-03-05 RX ADMIN — MUPIROCIN 2 %: 20 OINTMENT TOPICAL at 12:43

## 2025-03-05 ASSESSMENT — PAIN DESCRIPTION - PAIN TYPE
TYPE: ACUTE PAIN
TYPE: ACUTE PAIN

## 2025-03-06 LAB
ABO GROUP BLD: NORMAL
ALBUMIN SERPL BCP-MCNC: 3.7 G/DL (ref 3.2–4.9)
ALBUMIN/GLOB SERPL: 1.7 G/DL
ALP SERPL-CCNC: 72 U/L (ref 30–99)
ALT SERPL-CCNC: 13 U/L (ref 2–50)
ANION GAP SERPL CALC-SCNC: 8 MMOL/L (ref 7–16)
ANISOCYTOSIS BLD QL SMEAR: ABNORMAL
AST SERPL-CCNC: 10 U/L (ref 12–45)
BARCODED ABORH UBTYP: 5100
BARCODED ABORH UBTYP: 600
BARCODED PRD CODE UBPRD: NORMAL
BARCODED PRD CODE UBPRD: NORMAL
BARCODED UNIT NUM UBUNT: NORMAL
BARCODED UNIT NUM UBUNT: NORMAL
BASOPHILS # BLD AUTO: 0 % (ref 0–1.8)
BASOPHILS # BLD: 0 K/UL (ref 0–0.12)
BILIRUB SERPL-MCNC: 0.3 MG/DL (ref 0.1–1.5)
BLASTS NFR BLD MANUAL: 0.6 %
BLD GP AB SCN SERPL QL: NORMAL
BUN SERPL-MCNC: 15 MG/DL (ref 8–22)
BURR CELLS/RBC NFR CSF MANUAL: 0 %
CALCIUM ALBUM COR SERPL-MCNC: 8.4 MG/DL (ref 8.5–10.5)
CALCIUM SERPL-MCNC: 8.2 MG/DL (ref 8.5–10.5)
CHLORIDE SERPL-SCNC: 107 MMOL/L (ref 96–112)
CLARITY CSF: CLEAR
CO2 SERPL-SCNC: 23 MMOL/L (ref 20–33)
COLOR CSF: COLORLESS
COLOR SPUN CSF: COLORLESS
COMPONENT P 8504P: NORMAL
COMPONENT R 8504R: NORMAL
CREAT SERPL-MCNC: 0.61 MG/DL (ref 0.5–1.4)
CYTOLOGY REG CYTOL: NORMAL
EOSINOPHIL # BLD AUTO: 0 K/UL (ref 0–0.51)
EOSINOPHIL NFR BLD: 0 % (ref 0–6.9)
ERYTHROCYTE [DISTWIDTH] IN BLOOD BY AUTOMATED COUNT: 32.2 FL (ref 35.9–50)
GFR SERPLBLD CREATININE-BSD FMLA CKD-EPI: 138 ML/MIN/1.73 M 2
GLOBULIN SER CALC-MCNC: 2.2 G/DL (ref 1.9–3.5)
GLUCOSE SERPL-MCNC: 152 MG/DL (ref 65–99)
HCT VFR BLD AUTO: 21.4 % (ref 42–52)
HGB BLD-MCNC: 7.5 G/DL (ref 14–18)
LYMPHOCYTES # BLD AUTO: 0.73 K/UL (ref 1–4.8)
LYMPHOCYTES NFR BLD: 91.7 % (ref 22–41)
LYMPHOCYTES NFR CSF: 59 %
MANUAL DIFF BLD: ABNORMAL
MCH RBC QN AUTO: 27.7 PG (ref 27–33)
MCHC RBC AUTO-ENTMCNC: 35 G/DL (ref 32.3–36.5)
MCV RBC AUTO: 79 FL (ref 81.4–97.8)
MICROCYTES BLD QL SMEAR: ABNORMAL
MONOCYTES # BLD AUTO: 0.01 K/UL (ref 0–0.85)
MONOCYTES NFR BLD AUTO: 1.8 % (ref 0–13.4)
MONOS+MACROS NFR CSF MANUAL: 41 %
NEUTROPHILS # BLD AUTO: 0.05 K/UL (ref 1.82–7.42)
NEUTROPHILS NFR BLD: 5.9 % (ref 44–72)
NEUTROPHILS NFR CSF: 0 %
NUC CELL # CSF: <1 CELLS/UL (ref 0–10)
PHOSPHATE SERPL-MCNC: 3.1 MG/DL (ref 2.5–4.5)
PLATELET # BLD AUTO: 11 K/UL (ref 164–446)
PLATELET # BLD AUTO: 47 K/UL (ref 164–446)
PLATELET BLD QL SMEAR: NORMAL
PLATELETS.RETICULATED NFR BLD AUTO: 2.3 % (ref 0.6–13.1)
PMV BLD AUTO: 10.5 FL (ref 9–12.9)
POTASSIUM SERPL-SCNC: 4.3 MMOL/L (ref 3.6–5.5)
PRODUCT TYPE UPROD: NORMAL
PRODUCT TYPE UPROD: NORMAL
PROT SERPL-MCNC: 5.9 G/DL (ref 6–8.2)
RBC # BLD AUTO: 2.71 M/UL (ref 4.7–6.1)
RBC # CSF: 4 CELLS/UL
RBC BLD AUTO: PRESENT
RH BLD: NORMAL
SODIUM SERPL-SCNC: 138 MMOL/L (ref 135–145)
SPECIMEN VOL CSF: 2 ML
TUBE # CSF: 2
TUBE # CSF: NORMAL
UNIT STATUS USTAT: NORMAL
UNIT STATUS USTAT: NORMAL
URATE SERPL-MCNC: 1.5 MG/DL (ref 2.5–8.3)
WBC # BLD AUTO: 0.8 K/UL (ref 4.8–10.8)

## 2025-03-06 PROCEDURE — P9034 PLATELETS, PHERESIS: HCPCS

## 2025-03-06 PROCEDURE — 96450 CHEMOTHERAPY INTO CNS: CPT | Performed by: PEDIATRICS

## 2025-03-06 PROCEDURE — 700105 HCHG RX REV CODE 258: Performed by: PEDIATRICS

## 2025-03-06 PROCEDURE — 36430 TRANSFUSION BLD/BLD COMPNT: CPT

## 2025-03-06 PROCEDURE — 86901 BLOOD TYPING SEROLOGIC RH(D): CPT

## 2025-03-06 PROCEDURE — 81170 ABL1 GENE: CPT

## 2025-03-06 PROCEDURE — 85055 RETICULATED PLATELET ASSAY: CPT

## 2025-03-06 PROCEDURE — 86945 BLOOD PRODUCT/IRRADIATION: CPT

## 2025-03-06 PROCEDURE — A9270 NON-COVERED ITEM OR SERVICE: HCPCS | Performed by: PEDIATRICS

## 2025-03-06 PROCEDURE — 80053 COMPREHEN METABOLIC PANEL: CPT

## 2025-03-06 PROCEDURE — 700111 HCHG RX REV CODE 636 W/ 250 OVERRIDE (IP): Mod: JZ | Performed by: PEDIATRICS

## 2025-03-06 PROCEDURE — 84550 ASSAY OF BLOOD/URIC ACID: CPT

## 2025-03-06 PROCEDURE — 85027 COMPLETE CBC AUTOMATED: CPT

## 2025-03-06 PROCEDURE — 88108 CYTOPATH CONCENTRATE TECH: CPT | Mod: 26 | Performed by: PATHOLOGY

## 2025-03-06 PROCEDURE — 700101 HCHG RX REV CODE 250: Performed by: PEDIATRICS

## 2025-03-06 PROCEDURE — 99233 SBSQ HOSP IP/OBS HIGH 50: CPT | Mod: 25 | Performed by: PEDIATRICS

## 2025-03-06 PROCEDURE — 85007 BL SMEAR W/DIFF WBC COUNT: CPT

## 2025-03-06 PROCEDURE — 700102 HCHG RX REV CODE 250 W/ 637 OVERRIDE(OP): Performed by: PEDIATRICS

## 2025-03-06 PROCEDURE — 770004 HCHG ROOM/CARE - ONCOLOGY PRIVATE *

## 2025-03-06 PROCEDURE — 86900 BLOOD TYPING SEROLOGIC ABO: CPT

## 2025-03-06 PROCEDURE — 89051 BODY FLUID CELL COUNT: CPT

## 2025-03-06 PROCEDURE — 88108 CYTOPATH CONCENTRATE TECH: CPT | Performed by: PATHOLOGY

## 2025-03-06 PROCEDURE — 86850 RBC ANTIBODY SCREEN: CPT

## 2025-03-06 PROCEDURE — 30233N1 TRANSFUSION OF NONAUTOLOGOUS RED BLOOD CELLS INTO PERIPHERAL VEIN, PERCUTANEOUS APPROACH: ICD-10-PCS | Performed by: PEDIATRICS

## 2025-03-06 PROCEDURE — 84100 ASSAY OF PHOSPHORUS: CPT

## 2025-03-06 PROCEDURE — 3E0R305 INTRODUCTION OF OTHER ANTINEOPLASTIC INTO SPINAL CANAL, PERCUTANEOUS APPROACH: ICD-10-PCS | Performed by: PEDIATRICS

## 2025-03-06 PROCEDURE — 85049 AUTOMATED PLATELET COUNT: CPT

## 2025-03-06 PROCEDURE — 30233R1 TRANSFUSION OF NONAUTOLOGOUS PLATELETS INTO PERIPHERAL VEIN, PERCUTANEOUS APPROACH: ICD-10-PCS | Performed by: PEDIATRICS

## 2025-03-06 RX ORDER — LORAZEPAM 2 MG/ML
1 CONCENTRATE ORAL EVERY 6 HOURS PRN
OUTPATIENT
Start: 2025-03-27

## 2025-03-06 RX ORDER — DIPHENHYDRAMINE HYDROCHLORIDE 50 MG/ML
50 INJECTION INTRAMUSCULAR; INTRAVENOUS PRN
Status: CANCELLED | OUTPATIENT
Start: 2025-03-09

## 2025-03-06 RX ORDER — ONDANSETRON 2 MG/ML
8 INJECTION INTRAMUSCULAR; INTRAVENOUS EVERY 8 HOURS PRN
Status: CANCELLED | OUTPATIENT
Start: 2025-04-03

## 2025-03-06 RX ORDER — PROMETHAZINE HYDROCHLORIDE 6.25 MG/5ML
0.25 SYRUP ORAL EVERY 6 HOURS PRN
Status: CANCELLED | OUTPATIENT
Start: 2025-03-13

## 2025-03-06 RX ORDER — SODIUM CHLORIDE 9 MG/ML
500 INJECTION, SOLUTION INTRAVENOUS CONTINUOUS
Status: CANCELLED | OUTPATIENT
Start: 2025-03-20

## 2025-03-06 RX ORDER — LIDOCAINE AND PRILOCAINE 25; 25 MG/G; MG/G
CREAM TOPICAL PRN
Status: CANCELLED | OUTPATIENT
Start: 2025-03-20

## 2025-03-06 RX ORDER — ONDANSETRON 2 MG/ML
8 INJECTION INTRAMUSCULAR; INTRAVENOUS EVERY 8 HOURS
Status: CANCELLED | OUTPATIENT
Start: 2025-03-06

## 2025-03-06 RX ORDER — PROMETHAZINE HYDROCHLORIDE 6.25 MG/5ML
0.25 SYRUP ORAL EVERY 6 HOURS PRN
Status: CANCELLED | OUTPATIENT
Start: 2025-03-27

## 2025-03-06 RX ORDER — SULFAMETHOXAZOLE AND TRIMETHOPRIM 400; 80 MG/1; MG/1
2 TABLET ORAL
Status: DISCONTINUED | OUTPATIENT
Start: 2025-03-08 | End: 2025-03-09 | Stop reason: HOSPADM

## 2025-03-06 RX ORDER — IMATINIB MESYLATE 400 MG/1
400 TABLET, FILM COATED ORAL EVERY MORNING
Status: DISCONTINUED | OUTPATIENT
Start: 2025-03-06 | End: 2025-03-06

## 2025-03-06 RX ORDER — ONDANSETRON 2 MG/ML
8 INJECTION INTRAMUSCULAR; INTRAVENOUS EVERY 8 HOURS
Status: DISPENSED | OUTPATIENT
Start: 2025-03-06 | End: 2025-03-08

## 2025-03-06 RX ORDER — LIDOCAINE AND PRILOCAINE 25; 25 MG/G; MG/G
CREAM TOPICAL PRN
Status: CANCELLED | OUTPATIENT
Start: 2025-03-06

## 2025-03-06 RX ORDER — ONDANSETRON 2 MG/ML
8 INJECTION INTRAMUSCULAR; INTRAVENOUS EVERY 8 HOURS PRN
Status: CANCELLED | OUTPATIENT
Start: 2025-03-13

## 2025-03-06 RX ORDER — LIDOCAINE AND PRILOCAINE 25; 25 MG/G; MG/G
CREAM TOPICAL PRN
Status: DISCONTINUED | OUTPATIENT
Start: 2025-03-06 | End: 2025-03-09 | Stop reason: HOSPADM

## 2025-03-06 RX ORDER — ONDANSETRON 2 MG/ML
8 INJECTION INTRAMUSCULAR; INTRAVENOUS EVERY 8 HOURS PRN
Status: CANCELLED | OUTPATIENT
Start: 2025-03-27

## 2025-03-06 RX ORDER — ONDANSETRON 2 MG/ML
8 INJECTION INTRAMUSCULAR; INTRAVENOUS EVERY 8 HOURS PRN
Status: CANCELLED | OUTPATIENT
Start: 2025-03-06

## 2025-03-06 RX ORDER — ONDANSETRON 2 MG/ML
8 INJECTION INTRAMUSCULAR; INTRAVENOUS EVERY 8 HOURS PRN
OUTPATIENT
Start: 2025-04-03

## 2025-03-06 RX ORDER — ONDANSETRON 2 MG/ML
8 INJECTION INTRAMUSCULAR; INTRAVENOUS EVERY 8 HOURS PRN
Status: CANCELLED | OUTPATIENT
Start: 2025-03-20

## 2025-03-06 RX ORDER — PROMETHAZINE HYDROCHLORIDE 6.25 MG/5ML
0.25 SYRUP ORAL EVERY 6 HOURS PRN
Status: CANCELLED | OUTPATIENT
Start: 2025-03-06

## 2025-03-06 RX ORDER — LORAZEPAM 2 MG/ML
1 CONCENTRATE ORAL EVERY 6 HOURS PRN
Status: CANCELLED | OUTPATIENT
Start: 2025-03-06

## 2025-03-06 RX ORDER — EPINEPHRINE 1 MG/ML(1)
0.5 AMPUL (ML) INJECTION PRN
Status: CANCELLED | OUTPATIENT
Start: 2025-03-09

## 2025-03-06 RX ORDER — ONDANSETRON 2 MG/ML
8 INJECTION INTRAMUSCULAR; INTRAVENOUS EVERY 8 HOURS PRN
OUTPATIENT
Start: 2025-03-27

## 2025-03-06 RX ORDER — ONDANSETRON 2 MG/ML
8 INJECTION INTRAMUSCULAR; INTRAVENOUS ONCE
Status: CANCELLED | OUTPATIENT
Start: 2025-03-06

## 2025-03-06 RX ORDER — SULFAMETHOXAZOLE AND TRIMETHOPRIM 400; 80 MG/1; MG/1
2 TABLET ORAL
Status: CANCELLED | OUTPATIENT
Start: 2025-03-06

## 2025-03-06 RX ORDER — LIDOCAINE AND PRILOCAINE 25; 25 MG/G; MG/G
CREAM TOPICAL PRN
OUTPATIENT
Start: 2025-04-03

## 2025-03-06 RX ORDER — LORAZEPAM 2 MG/ML
1 CONCENTRATE ORAL EVERY 6 HOURS PRN
Status: CANCELLED | OUTPATIENT
Start: 2025-03-13

## 2025-03-06 RX ORDER — ONDANSETRON 2 MG/ML
8 INJECTION INTRAMUSCULAR; INTRAVENOUS ONCE
OUTPATIENT
Start: 2025-04-03

## 2025-03-06 RX ORDER — LIDOCAINE AND PRILOCAINE 25; 25 MG/G; MG/G
CREAM TOPICAL PRN
Status: CANCELLED | OUTPATIENT
Start: 2025-03-13

## 2025-03-06 RX ORDER — SODIUM CHLORIDE 9 MG/ML
500 INJECTION, SOLUTION INTRAVENOUS CONTINUOUS
OUTPATIENT
Start: 2025-04-03

## 2025-03-06 RX ORDER — ONDANSETRON 2 MG/ML
8 INJECTION INTRAMUSCULAR; INTRAVENOUS ONCE
OUTPATIENT
Start: 2025-03-27

## 2025-03-06 RX ORDER — LIDOCAINE AND PRILOCAINE 25; 25 MG/G; MG/G
CREAM TOPICAL PRN
Status: CANCELLED | OUTPATIENT
Start: 2025-03-09

## 2025-03-06 RX ORDER — IMATINIB MESYLATE 100 MG/1
250 TABLET, FILM COATED ORAL EVERY EVENING
Status: DISCONTINUED | OUTPATIENT
Start: 2025-03-06 | End: 2025-03-06

## 2025-03-06 RX ORDER — DIPHENHYDRAMINE HYDROCHLORIDE 50 MG/ML
50 INJECTION INTRAMUSCULAR; INTRAVENOUS ONCE
Status: CANCELLED | OUTPATIENT
Start: 2025-03-09 | End: 2025-03-09

## 2025-03-06 RX ORDER — LORAZEPAM 2 MG/ML
1 CONCENTRATE ORAL EVERY 6 HOURS PRN
OUTPATIENT
Start: 2025-04-03

## 2025-03-06 RX ORDER — ONDANSETRON 2 MG/ML
8 INJECTION INTRAMUSCULAR; INTRAVENOUS ONCE
Status: CANCELLED | OUTPATIENT
Start: 2025-03-20

## 2025-03-06 RX ORDER — ONDANSETRON 2 MG/ML
8 INJECTION INTRAMUSCULAR; INTRAVENOUS ONCE
Status: CANCELLED | OUTPATIENT
Start: 2025-03-13

## 2025-03-06 RX ORDER — ONDANSETRON 2 MG/ML
8 INJECTION INTRAMUSCULAR; INTRAVENOUS ONCE
Status: CANCELLED | OUTPATIENT
Start: 2025-04-03

## 2025-03-06 RX ORDER — ONDANSETRON 2 MG/ML
8 INJECTION INTRAMUSCULAR; INTRAVENOUS ONCE
Status: CANCELLED | OUTPATIENT
Start: 2025-03-27

## 2025-03-06 RX ORDER — LIDOCAINE AND PRILOCAINE 25; 25 MG/G; MG/G
CREAM TOPICAL PRN
OUTPATIENT
Start: 2025-03-27

## 2025-03-06 RX ORDER — PROMETHAZINE HYDROCHLORIDE 6.25 MG/5ML
0.25 SYRUP ORAL EVERY 6 HOURS PRN
Status: CANCELLED | OUTPATIENT
Start: 2025-03-20

## 2025-03-06 RX ORDER — SODIUM CHLORIDE 9 MG/ML
500 INJECTION, SOLUTION INTRAVENOUS CONTINUOUS
Status: CANCELLED | OUTPATIENT
Start: 2025-03-13

## 2025-03-06 RX ORDER — MIDAZOLAM HYDROCHLORIDE 1 MG/ML
2 INJECTION INTRAMUSCULAR; INTRAVENOUS ONCE
Status: COMPLETED | OUTPATIENT
Start: 2025-03-06 | End: 2025-03-06

## 2025-03-06 RX ORDER — PROMETHAZINE HYDROCHLORIDE 6.25 MG/5ML
0.25 SYRUP ORAL EVERY 6 HOURS PRN
OUTPATIENT
Start: 2025-03-27

## 2025-03-06 RX ORDER — SODIUM CHLORIDE 9 MG/ML
500 INJECTION, SOLUTION INTRAVENOUS CONTINUOUS
Status: CANCELLED | OUTPATIENT
Start: 2025-03-09

## 2025-03-06 RX ORDER — PROMETHAZINE HYDROCHLORIDE 6.25 MG/5ML
0.25 SYRUP ORAL EVERY 6 HOURS PRN
Status: CANCELLED | OUTPATIENT
Start: 2025-04-03

## 2025-03-06 RX ORDER — SODIUM CHLORIDE 9 MG/ML
500 INJECTION, SOLUTION INTRAVENOUS CONTINUOUS
Status: CANCELLED | OUTPATIENT
Start: 2025-03-06

## 2025-03-06 RX ORDER — ONDANSETRON 2 MG/ML
8 INJECTION INTRAMUSCULAR; INTRAVENOUS ONCE
Status: COMPLETED | OUTPATIENT
Start: 2025-03-06 | End: 2025-03-06

## 2025-03-06 RX ORDER — SODIUM CHLORIDE 9 MG/ML
500 INJECTION, SOLUTION INTRAVENOUS CONTINUOUS
OUTPATIENT
Start: 2025-03-27

## 2025-03-06 RX ORDER — LORAZEPAM 2 MG/ML
1 CONCENTRATE ORAL EVERY 6 HOURS PRN
Status: CANCELLED | OUTPATIENT
Start: 2025-03-20

## 2025-03-06 RX ORDER — PROMETHAZINE HYDROCHLORIDE 6.25 MG/5ML
0.25 SYRUP ORAL EVERY 6 HOURS PRN
OUTPATIENT
Start: 2025-04-03

## 2025-03-06 RX ADMIN — ALLOPURINOL 200 MG: 100 TABLET ORAL at 13:13

## 2025-03-06 RX ADMIN — PREDNISONE 60 MG: 50 TABLET ORAL at 05:11

## 2025-03-06 RX ADMIN — ALLOPURINOL 200 MG: 100 TABLET ORAL at 05:10

## 2025-03-06 RX ADMIN — CEFEPIME 2 G: 2 INJECTION, POWDER, FOR SOLUTION INTRAVENOUS at 13:13

## 2025-03-06 RX ADMIN — ONDANSETRON 8 MG: 2 INJECTION INTRAMUSCULAR; INTRAVENOUS at 15:54

## 2025-03-06 RX ADMIN — ALLOPURINOL 200 MG: 100 TABLET ORAL at 18:15

## 2025-03-06 RX ADMIN — FAMOTIDINE 40 MG: 20 TABLET, FILM COATED ORAL at 18:15

## 2025-03-06 RX ADMIN — IMATINIB MESYLATE 600 MG: 400 TABLET ORAL at 13:39

## 2025-03-06 RX ADMIN — METHOTREXATE 15 MG: 25 INJECTION INTRA-ARTERIAL; INTRAMUSCULAR; INTRATHECAL; INTRAVENOUS at 16:41

## 2025-03-06 RX ADMIN — MIDAZOLAM HYDROCHLORIDE 2 MG: 1 INJECTION, SOLUTION INTRAMUSCULAR; INTRAVENOUS at 15:53

## 2025-03-06 RX ADMIN — MUPIROCIN 2 %: 20 OINTMENT TOPICAL at 18:16

## 2025-03-06 RX ADMIN — ONDANSETRON 8 MG: 2 INJECTION INTRAMUSCULAR; INTRAVENOUS at 21:52

## 2025-03-06 RX ADMIN — CEFEPIME 2 G: 2 INJECTION, POWDER, FOR SOLUTION INTRAVENOUS at 21:52

## 2025-03-06 RX ADMIN — MUPIROCIN 2 %: 20 OINTMENT TOPICAL at 05:11

## 2025-03-06 RX ADMIN — SODIUM CHLORIDE: 9 INJECTION, SOLUTION INTRAVENOUS at 23:50

## 2025-03-06 RX ADMIN — PREDNISONE 60 MG: 50 TABLET ORAL at 18:15

## 2025-03-06 RX ADMIN — MUPIROCIN 2 %: 20 OINTMENT TOPICAL at 13:13

## 2025-03-06 RX ADMIN — VINCRISTINE SULFATE 2 MG: 1 INJECTION, SOLUTION INTRAVENOUS at 18:06

## 2025-03-06 RX ADMIN — CEFEPIME 2 G: 2 INJECTION, POWDER, FOR SOLUTION INTRAVENOUS at 05:11

## 2025-03-06 RX ADMIN — SODIUM CHLORIDE: 9 INJECTION, SOLUTION INTRAVENOUS at 06:16

## 2025-03-06 RX ADMIN — FAMOTIDINE 40 MG: 20 TABLET, FILM COATED ORAL at 05:10

## 2025-03-06 ASSESSMENT — PAIN DESCRIPTION - PAIN TYPE
TYPE: ACUTE PAIN
TYPE: ACUTE PAIN

## 2025-03-06 ASSESSMENT — FIBROSIS 4 INDEX: FIB4 SCORE: 5.8

## 2025-03-06 NOTE — PROGRESS NOTES
"Pharmacy Chemotherapy Calculations:    Dx: relapsed B-ALL (CNS3)     Cycle 1, Day 1  Previous treatment = Maintenance [id number] (5/20/24)    Protocol: Individualized 3 Drug Reinduction + Imatininb  *Dosing regimen*     IT methotrexate 15 mg for age >/=9 yrs on Days 1, 8, and 29  Vincristine 1.5 mg/m2 (max 2 mg) IV over 5-10 mins on Days 1, 8, 15, and 22  Prednisone 30 mg/m2 PO BID on Days 1-28  Pegaspargase 2000 units/m2 IV over 1-2 hrs on Day 4  Imatinib 340 mg/m2 PO daily (split BID if dose >600 mg)  Dosing references: [facility] protocols [id number] and 1631 (imantinib)  Plan to transition to Blincyto consolidation after induction      Allergies:  Amoxicillin    /71   Pulse 70   Temp 36.4 °C (97.6 °F) (Oral)   Resp 16   Ht 1.651 m (5' 5\")   Wt 75.7 kg (166 lb 14.2 oz)   SpO2 98%   BMI 27.77 kg/m²  Body surface area is 1.86 meters squared.      Labs 3/6/5:  ANC~ 50 Plt = 11k*   Hgb = 7.5     SCr = 0.61mg/dL CrCl ~ >125 mL/min (min SCr 0.7 used  AST/ALT/AP = 10/13/72 TBili = 0.3   *plt parameter for IT MTX - Okay to proceed per Dr. Faye, plt transfusion ordered    IT methotrexate 15 mg fixed dose   No calc required = 15 mg IT to be give by MD    Vincristine 1.5 mg/m² x 1.86 m² = > 2 mg   <10% difference, okay to treat with final dose = 2 mg IV       Brandy Larsen, PharmD    "

## 2025-03-06 NOTE — PROGRESS NOTES
Chemotherapy Verification - PRIMARY RN      Height = 165.1 cm  Weight = 75.7 kg  BSA = 1.86 m2       Medication: Vincristine  Dose: 1.5 mg/m2  Calculated Dose: 2.79 mg                             (2 mg max dose)     Medication: Methotrexate  Dose: 15 mg IT set dose  Calculated Dose: set dose                             (In mg/m2, AUC, mg/kg)      I confirm this process was performed independently with the BSA and all final chemotherapy dosing calculations congruent.  Any discrepancies of 10% or greater have been addressed with the chemotherapy pharmacist. The resolution of the discrepancy has been documented in the EPIC progress notes.

## 2025-03-06 NOTE — PROGRESS NOTES
"Pediatric Hematology/Oncology  Daily Progress Note      Patient Name:  Tomas Jean-Baptiste  : 2000  MRN: [id number]    Location of Service:  [facility]  Date of Service: 3/5/2025  Time: 9:00 AM    Hospital Day: 6    Protocol / Treatment Plan:  Relapsed Leukemia NOS - Prednisone     SUBJECTIVE:     No acute events overnight.  Afebrile 98.2 °F.  Feeling well without any complaints today.  No complaints of any nausea, abdominal discomfort or pain.  No vomiting.  No complaints of any aches or pains.  No skin changes or rashes.  Well healed line insertion sites.  No aches or pains.  No other concerns or complaints.     Review of Systems:     Constitutional: Afebrile, feeling well overall.  Good appetite and oral intake.  HENT: Negative for auditory changes or ear pain, no nosebleeds, no congestion, no rhinorrhea, no sore throat.  No mouth sores.  Eyes: Negative for visual changes.  Respiratory: Negative for shortness of breath.  Cardiovascular: Negative.  Gastrointestinal: Negative for nausea, vomiting, abdominal pain, diarrhea, constipation.  Genitourinary: Negative.  Musculoskeletal: Negative for arm pain or leg pain.    Skin: Negative for rash or skin infection.  Neurological: Negative for numbness, tingling, sensory changes, weakness or headaches.    Endo/Heme/Allergies: No bruising/bleeding easily.    Psychiatric/Behavioral: Stable mood.     OBJECTIVE:     Max Temp: Temp (24hrs), Av.6 °C (97.8 °F), Min:36.3 °C (97.3 °F), Max:36.8 °C (98.2 °F)    Vitals: /58   Pulse 85   Temp 36.4 °C (97.5 °F) (Oral)   Resp 16   Ht 1.651 m (5' 5\")   Wt 77.7 kg (171 lb 4.8 oz)   SpO2 96%   BMI 28.51 kg/m²     I/O: No intake or output data in the 24 hours ending 25      Labs:   Latest Reference Range & Units 25 00:25   WBC 4.8 - 10.8 K/uL 0.7 (LL)   RBC 4.70 - 6.10 M/uL 2.70 (L)   Hemoglobin 14.0 - 18.0 g/dL 7.6 (L)   Hematocrit 42.0 - 52.0 % 21.4 (L)   MCV 81.4 - " 97.8 fL 79.3 (L)   MCH 27.0 - 33.0 pg 28.1   MCHC 32.3 - 36.5 g/dL 35.5   RDW 35.9 - 50.0 fL 33.9 (L)   Platelet Count 164 - 446 K/uL 15 (LL)   MPV 9.0 - 12.9 fL 11.4   Neutrophils-Polys 44.00 - 72.00 % 0.80 (L)   Neutrophils (Absolute) 1.82 - 7.42 K/uL 0.01 (LL)   Lymphocytes 22.00 - 41.00 % 97.50 (H)   Lymphs (Absolute) 1.00 - 4.80 K/uL 0.68 (L)   Monocytes 0.00 - 13.40 % 1.70   Monos (Absolute) 0.00 - 0.85 K/uL 0.01   Eosinophils 0.00 - 6.90 % 0.00   Eos (Absolute) 0.00 - 0.51 K/uL 0.00   Basophils 0.00 - 1.80 % 0.00   Baso (Absolute) 0.00 - 0.12 K/uL 0.00   Plt Estimation  Decreased   Imm. Plt Fraction 0.6 - 13.1 % 2.8   RBC Morphology  Present   Microcytosis  3+ !   Manual Diff Status  PERFORMED   Comment  See Comment   Sodium 135 - 145 mmol/L 139   Potassium 3.6 - 5.5 mmol/L 4.0   Chloride 96 - 112 mmol/L 109   Co2 20 - 33 mmol/L 20   Anion Gap 7.0 - 16.0  10.0   Glucose 65 - 99 mg/dL 133 (H)   Bun 8 - 22 mg/dL 12   Creatinine 0.50 - 1.40 mg/dL 0.53   GFR (CKD-EPI) >60 mL/min/1.73 m 2 144   Calcium 8.5 - 10.5 mg/dL 7.5 (L)   Correct Calcium 8.5 - 10.5 mg/dL 7.8 (L)   AST(SGOT) 12 - 45 U/L 10 (L)   ALT(SGPT) 2 - 50 U/L 10   Alkaline Phosphatase 30 - 99 U/L 69   Total Bilirubin 0.1 - 1.5 mg/dL 0.3   Albumin 3.2 - 4.9 g/dL 3.6   Total Protein 6.0 - 8.2 g/dL 5.8 (L)   Globulin 1.9 - 3.5 g/dL 2.2   A-G Ratio g/dL 1.6   (LL): Data is critically low  (L): Data is abnormally low  (H): Data is abnormally high  !: Data is abnormal    Physical Exam:    Constitutional: Overall well appearing.  Non-toxic.    HENT: Normocephalic and atraumatic.  No rhinorrhea. Oropharynx is clear and moist.   Eyes: Conjunctivae are normal. EOMI. Non-icteric. Pupils equal and round.  Neck: Normal range of motion of neck, no adenopathy.    Cardiovascular: Regular rate, regular rhythm.  No murmur. DP/radial pulses 2+, cap refill < 2 sec.  Pulmonary/Chest: Effort normal. No respiratory distress. Symmetric expansion.  No crackles or  wheezes.  Abdomen: Soft. Bowel sounds are normal. No distension and no mass. There is no hepatosplenomegaly.    Genitourinary:  Deferred.  Musculoskeletal: Normal range of motion of lower and upper extremities bilaterally.   Neurological: Alert and oriented to person and place. Exhibits normal muscle tone bilaterally in upper and lower extremities. Gait not assessed this morning. Coordination normal.    Skin: Improved erythema of right distal fourth phalanx again today does have desquamation today in the area of previous erythema.  Right forearm with Bactroban ointment, stable rash.  CVL surgical sites C/D/I  Psychiatric: Mood is stable.      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, received cranial radiation 6/5/2023 to 6/16/2023              - Testicular disease negative at diagnosis              - Several complications throughout therapy to include severe septic shock 12/27/2022 while in Delayed Intensification              - Completed therapy 5/30/2024              - Last seen in clinic on 1/15/2025 without any evidence of relapse (clinical/laboratory)              - Reported viral URI symptoms approximately 2.5 weeks prior to presentation               - Interval resolution of viral URI symptoms followed by very acute worsening of flulike symptoms as well as aches and pains              - Seen in clinic 2/27/2025:    - WBC 1000 cells/uL, Hgb 10.6 g/dL, platelets 53,000 cells/uL              - ANC 10, , absolute monocyte count 20                 - Precipitous drop of counts  over the past month with context of low-grade temperatures and bone pain most consistent with relapsed leukemia                 - Admission for Fever and Neutropenia 2/27/2025                 - Double-lumen Broviac line placement, diagnostic bone marrow evaluation to include aspirate and biopsy, flow cytometry and FISH to confirm relapse at bedside 2/28/2025  - Testicular negative at relapse  - CSF negative consistent with CNS1  - Confirmed 13% blasts in hemodilute BMA specimen by flow  - Confirmed BCR-ABL1 fusion in 17% cells by FISH              - Have already reached out to transplant colleagues to begin planning allogenic transplant.                 - After discussing multiple options, will propose to patient a 3-Drug Re-Induction (VCR/PRED/PEG-ASP) + Imatinib followed by blinatumomab     2) Fever and Neutropenia:              - ANC 10 2/27/2025              - Fever to 101.6 °F 2/27/2025              - Paronychia of the fourth distal phalanx of the right hand as possible source of infection - greatly improved since starting antibiotics              - Admission to CNU 2/27/2025              - Given clinical stability, no fluid bolus administered              - Blood culture obtained peripherally (no central access) NGTD              - Ordered HSV studies on blood given HSV 1 positive titers previously as well as fourth distal phalanx paronychia NOT DETECTED              - Cefepime 2 g IV Q8 hours started empirically              - Tylenol 650 mg PO Q6 hours for fever              - Supportive care of symptoms     3) Paronychia: (IMPROVED)              - Paronychia of the right fourth distal phalanx with intense erythema              - 3-4 x daily warm water bath soaks              - Mupirocin ointment topically              - Cefepime as above              - Bloodstream without evidence of HSV infection     4) 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:              - Continue hydration with normal saline               -  Allopurinol 200 mg PO TID              - Daily TLS labs      - K+ 4.0, Ca++ 7.8 today    5) Disease Related Pancytopenia:              -  cells/uL, Hgb 7.6 g/dL, platelets 15,000 cells/uL              - ANC 10, , absolute monocyte count 10              - Transfuse irradiated PRBC for Hgb<7 g/dL or symptomatic              - Transfuse irradiated platelets for platelet count of <10,000 cells/uL or symptomatic                 - No transfusion today   - CBC daily     6) Vascular Access:              - None currently              - Double-lumen CVL placed 2/28/2025     7) Psychosocial:   - Dr. Ortega seeing     8) Social:              - Referred  to Social Work and financial navigator     Disposition: Inpatient for fever and neutropenia, workup of presumed relapsed disease, referral to transplant - initiation of re-induction therapy.    Pepe Faye MD  Pediatric Hematology / Oncology  [facility]  Cell.  [phone]  Office. [phone]

## 2025-03-06 NOTE — CARE PLAN
Problem: Knowledge Deficit - Standard  Goal: Patient and family/care givers will demonstrate understanding of plan of care, disease process/condition, diagnostic tests and medications  Outcome: Progressing     Problem: Neutropenia Precautions  Goal: Neutropenic precautions will be implemented and maintained for patient protection  Outcome: Progressing     The patient is Watcher - Medium risk of patient condition declining or worsening    Shift Goals  Clinical Goals: remain afebrile, monitor labs  Patient Goals: POC updates  Family Goals: None Present    Progress made toward(s) clinical / shift goals:  pt updated on POC    Patient is not progressing towards the following goals:

## 2025-03-06 NOTE — PROGRESS NOTES
"Pharmacy Chemotherapy Verification Note:    Dx: B-ALL, PH+ relapsed        Protocol: Individualized 3 Drug Reinduction + Imatininb     IT methotrexate 15 mg for age >/=9 yrs on Days 1, 8, and 29  Vincristine 1.5 mg/m2 (max 2 mg) IV over 5-10 mins on Days 1, 8, 15, and 22  Prednisone 30 mg/m2 PO BID on Days 1-28  Pegaspargase 2000 units/m2 IV over 1-2 hrs on Day 4  Imatinib 340 mg/m2 PO daily (split BID if dose >600 mg)    Dosing references: [facility] [place], [place] protocols WSPS0391/1732 and 1631 (imantinib)  Plan to transition to Blincyto consolidation after induction    Allergies:  Amoxicillin     /71   Pulse 70   Temp 36.4 °C (97.6 °F) (Oral)   Resp 16   Ht 1.651 m (5' 5\")   Wt 75.7 kg (166 lb 14.2 oz)   SpO2 98%   BMI 27.77 kg/m²  Body surface area is 1.86 meters squared.    Labs from 3/6/25 reviewed - ok to proceed with treatment today per Dr Faye, plts ordered prior to LP     Drug Order   (Drug name, dose, route, IV Fluid & volume, frequency, number of doses) Cycle: Induction Day 1      Previous treatment: completed initial treatment 5/30/24     Medication = IT methotrexate  Base Dose = 15 mg fixed dose  Calc Dose: n/a  Final Dose = 15 mg  Route = intraTHECAL  Fluid & Volume = NS 6 mL  Admin Duration = n/a   Days 1, 8, and 29       <10% difference, okay to treat with final dose   Medication = vincristine  Base Dose = 1.5 mg/m2 (max 2 mg)  Calc Dose: Base Dose x 1.86 m2 = 2.79 mg  Final Dose = 2 mg  Route = IV  Fluid & Volume = NS 25 mL  Admin Duration = Over 5-10 mins   Days 1, 8, 15, and 22       okay to treat with max final dose   Medication = pegaspargase (Oncaspar)  Base Dose = 2000 units/m2  Calc Dose:Base Dose x 1.86 m2 = 3720 units  Final Dose = -- units  Route = IV  Fluid & Volume =  mL  Admin Duration = Over 2 hrs   Day 4       <10% difference, okay to treat with final dose   Oral chemo: imatinib 340 mg/m2 x 1.86m2 = 632.4 mg                         <10% difference, okay to treat with final dose = " 600 mg PO daily    By my signature below, I confirm this process was performed independently with the BSA and all final chemotherapy dosing calculations congruent. I have reviewed the above chemotherapy order and that my calculation of the final dose and BSA (when applicable) corroborate those calculations of the  pharmacist.     Judy Bryant, PharmD, BCOP

## 2025-03-06 NOTE — CARE PLAN
The patient is Stable - Low risk of patient condition declining or worsening    Shift Goals  Clinical Goals: Patient will remain afebrile this shift and assymptomatic of anemia  Patient Goals: Updates with Dr. Faye  Family Goals: Not present    Progress made toward(s) clinical / shift goals:    Problem: Knowledge Deficit - Standard  Goal: Patient and family/care givers will demonstrate understanding of plan of care, disease process/condition, diagnostic tests and medications  Outcome: Progressing  Note: Discussed POC and shift goals with patient and primary team. All questions answered as able. Dr. Faye to bedside. Discussed neutropenic precautions and expectations for shift.      Problem: Neutropenia Precautions  Goal: Neutropenic precautions will be implemented and maintained for patient protection  Outcome: Progressing  Note: Neutropenic precautions in place-  Hand hygiene for all staff visitors  Avoid exposure to visitors or staff with infection   No fresh flowers or plants   Avoid invasive procedures (suppositories, rectal temp, daniels cath)   Ensure all food is cooked thoroughly, no raw or rare meat.   Patient remains afebrile this shift.        Patient is not progressing towards the following goals: NA

## 2025-03-06 NOTE — CARE PLAN
The patient is Watcher - Medium risk of patient condition declining or worsening    Shift Goals  Clinical Goals: remain afebrile, monitor labs  Patient Goals: POC updates  Family Goals: None Present    Progress made toward(s) clinical / shift goals:      Problem: Knowledge Deficit - Standard  Goal: Patient and family/care givers will demonstrate understanding of plan of care, disease process/condition, diagnostic tests and medications  Outcome: Progressing     Problem: Neutropenia Precautions  Goal: Neutropenic precautions will be implemented and maintained for patient protection  Outcome: Progressing

## 2025-03-06 NOTE — PROGRESS NOTES
PEDIATRIC BEHAVIORAL HEALTH VISIT    ADULT REQUEST FOR CONFIDENTIALITY    Name:  Tomas Jean-Baptiste  MRN:  [id number]  :  2000  Age:  24 y.o.  Referring Provider: Dr. Faye (Hem/Onc)  Pediatrician:  Margarito Arvizu M.D.  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 See intake note dated 23. With his current relapse, psychology is meeting with JULY to process and cope with how it is impacting his life.     Mental Status Exam:   General description cooperative with interview  Interactional style Culturally appropriate  Eye contact Appropriate  Speech Unimpaired, fluid and clear, normal rate and rythem  Motor activity Average  Orientation Oriented to person time, place and situation  Intellectual functioning Unimpaired  Memory Unimpaired  Attention and concentration Intact and normative concentration  Fund of knowledge Average  Mood Generally euthymic  Affect Appropriate  Perceptual Disturbances None apparent  Thought Process  No abnormalities apparent       Associations Unimpaired associations       Abstractions Normal abstractions, intact       Insight Insight - adequate and normative       Judgment Judgments - intact and normative   Thought Content  No apparent delusions    Risk Assessment:  Tomas Roy did not report current concerns regarding risk to self or others.       Issues Discussed:   This provider met with JULY to process his recent relapse and how it is impacting his life and relationships.     Techniques and Interventions Used: Psycho-education and Cognitive Behavioral Therapy (CBT)      Treatment Recommendations and  Plan:  JULY, a 25 y/o male currently in treatment for relapsed [place] Chromosome Precursor B-Cell Acute Lymphoblastic Leukemia was previously referred to counseling to assist in decreasing anxiety he had been experiencing and processing ways to find himself now and what life will look like. After meeting with JULY during his intake, it appears he could benefit from learning anxiety management skills, processing what he has been through, and how to live the life he wants. In treatment, Tomas Jean-Baptiste benefited from learning appropriate ways of expressing and coping with his emotions, learning Cognitive Behavioral Therapy (CBT) and Acceptance and Commitment Therapy (ACT) tools to understand the interaction of thoughts, feelings, and actions, as well as Trauma Focused-CBT to process his cancer journey. Currently, he could benefit from processing how relapse is impacting his life and relationships and how the past is influencing him.       PLAN  JULY will learn and utilize appropriate ways to express and cope with emotions.    He will learn the connection between thoughts, feelings, and actions utilizing CBT and ACT tools.,   JULY will process how their medical diagnosis is impacting their life.    This provider will meet with him again tomorrow.     The above diagnostic impressions, recommendations, and treatment plan were discussed with and agreed upon by Tomas Kirill, and his caregivers. Care will be coordinated with Tomas Roy's healthcare team, as appropriate.    Total time spent on encounter was 60 minutes.    Laurie Ortega, PhD  Pediatric Psychologist   Licensed Psychologist, [place] # [id number]  [facility], Behavioral Health

## 2025-03-06 NOTE — Clinical Note
REFERRAL APPROVAL NOTICE         Sent on March 6, 2025                   JULY Jean-Baptiste  [address] [place] [address]                   Dear Mr. Jean-Baptiste,    After a careful review of the medical information and benefit coverage, [facility] has processed your referral. See below for additional details.    If applicable, you must be actively enrolled with your insurance for coverage of the authorized service. If you have any questions regarding your coverage, please contact your insurance directly.    REFERRAL INFORMATION   Referral #:  [id number]  Referred-To Provider    Referred-By Provider:  BRIAN Bustillos ALICE B      [address]  [place] [address]  [phone] [address]  [facility] [place] [address]  [phone]    Referral Start Date:  02/28/2025  Referral End Date:   02/28/2026             SCHEDULING  If you do not already have an appointment, please call [phone] to make an appointment.     MORE INFORMATION  If you do not already have a [employer] account, sign up at: [email or web address]  You can access your medical information, make appointments, see lab results, billing information, and more.  If you have questions regarding this referral, please contact  the [facility] Referrals department at:             [phone]. Monday - Friday 8:00AM - 5:00PM.     Sincerely,    [facility]

## 2025-03-06 NOTE — PROGRESS NOTES
Date of Service: 2024    Chief Complaint:   Malini Atkinson is a 49 y.o. female presenting today for her 6-month evaluation. She is due for an ultrasound.  She reports no interval changes.     History of Present Illness: Mrs. Leon first presented on 2021 due to her concerns of her future breast cancer risk. She has a family history that is worrisome. Her imaging has been normal. Her REINA was calculated in  and found to give her a 19.5 % Lifetime Risk of Breast Cancer. MD::: Harsha Ovalle MD.     Past Medical History:   Diagnosis Date    Anxiety     Breast lump on left side at 6 o'clock position 2023    Family history of breast cancer 2023    Fibrocystic breast changes of both breasts 2023    IBS (irritable bowel syndrome)     Migraines       Past Surgical History:   Procedure Laterality Date    anal manometry       anal repair after childbirth and episiotomy      APPENDECTOMY      AUGMENTATION OF BREAST       SECTION      EPISIOTOMY      PARTIAL HYSTERECTOMY      sacreal nerve stimulator           Current Outpatient Medications:     ATIVAN 1 mg tablet, Take 1 mg by mouth daily as needed., Disp: , Rfl:    Review of patient's allergies indicates:  No Known Allergies   Social History     Tobacco Use    Smoking status: Never     Passive exposure: Never    Smokeless tobacco: Never   Substance Use Topics    Alcohol use: Not Currently      Family History   Problem Relation Name Age of Onset    Breast cancer Mother  53        gene negative    Ovarian cancer Neg Hx          Review of Systems   Integumentary:  Negative for color change, rash, mole/lesion, thickening/swelling, dimpling of skin, drainage  Breast: Negative for mass and tenderness     Physical Exam   Constitutional: She appears well-developed. She is cooperative.   HENT: Normocephalic.   Cardiovascular:  Normal rate and regular rhythm.            Pulmonary/Chest: She exhibits no tenderness and no bony  Chemotherapy Verification - SECONDARY RN       Height = 165.1 cm  Weight = 75.7 kg  BSA = 1.86 m2       Medication: Methotrexate  Dose: 15 mg (fixed dose)  Calculated Dose: NA (fixed dose)                             (In mg/m2, AUC, mg/kg)     Medication: Vincristine  Dose: 1.5 mg/m2  Calculated Dose: 2.79 mg Ordered Dose: 2 mg                             (In mg/m2, AUC, mg/kg)        I confirm that this process was performed independently.    tenderness.   Abdominal: Soft. Normal appearance.   Musculoskeletal: Intact with no deficits  Neurological: She is alert.   Skin: No rash noted.   Breast and Chest Wall Evaluation:   Right breast exhibits no mass, no nipple discharge, no skin change and no tenderness.     Left breast exhibits no mass, no nipple discharge, no skin change and no tenderness.     Lymphadenopathy: No supraclavicular or axillary adenopathy.    ULTRASOUND EVALUATION and REPORT    Bilateral real-time Ultrasound was performed by me.  All four quadrants of the breast, the subareolar and axillary basins were scanned.    She has some heterogeneous dense fibroglandular tissue bilaterally.    Right Breast: She has normal tissues in the right breast; there's no disruption of the tissue planes and no abnormal shadowing; she has normal skin thickness with no subcutaneous nodules of skin thickening; NEM     Left Breast: She has normal tissues in the left breast; there's no disruption of the tissue planes and no abnormal shadowing; she has normal skin thickness with no subcutaneous nodules or skin thickening; she has a few simple cysts in the UOQ and upper breast; NEM     Axillae: There are no abnormal lymph nodes seen bilaterally.     Impression: Some dense but normal tissue bilaterally with no solid/suspicious masses noted. No LAD in bilateral axillae.  BIRADS: Category 2 - Benign Finding.    Findings were discussed with patient in real time and a hand written report was given to her at the conclusion of the exam.      ASSESSMENT and PLAN OF CARE     1. Breast lump on left side at 6 o'clock position  Assessment & Plan:  We reviewed our findings today and her questions were answered.  She understands that her imaging and exams have remained stable (and show nothing concerning).  She is comfortable being followed in a conservative fashion.      She understands the importance of monthly self-breast examination and knows to report any and all changes as  they occur.    NOTE:::We viewed her films together at today's visit.  We discussed the multiple views obtained and the important findings.  Even benign changes were mentioned and her questions were answered.  She is to contact us if she has questions.        2. Family history of breast cancer  Assessment & Plan:  We discussed her family history and how it could impact her own future risks.  We discussed family vs. genetic history and the importance and implications of each.  Genetic Counseling/Testing was offered, and all questions answered to her satisfaction.  She knows that as additional family members are diagnosed - she will need to let us know as this may change follow up and imaging recommendations.    We had a discussion concerning Breast Cancer Risk Reduction and current NCCN Guidelines. She knows that her risk can be lowered slightly with a healthy lifestyle and minimal ETOH use. Being physically active will also help. She should reduce or stay away from OCPs and HRT as possible.        3. Fibrocystic breast changes of both breasts  Assessment & Plan:  We discussed our fibrocystic mastopathy protocol in detail. She knows that if she follows this protocol - that her symptoms should improve.  We discussed how breast pain is usually not associated with breast cancer, however, pain can be the presenting symptom with some cancers (but this could be coincidental). Still, if her pain does not improve in 8-12 weeks she should call us back for additional recommendations.       Medical Decision Making: It is my impression that the patient suffers from all the listed conditions.  Each of these conditions did affect my Plan of Care and all medical decisions that were rendered.  The medical decision making was of high difficulty based on her co-morbidities and her previous diagnosis of being a High-Risk Patient given her personal and family histories.   I have performed and reviewed all imaging and it has been  discussed with her. I have reviewed and verified her allergies, list of medications, medical and surgical histories, social history, and a pertinent review of symptoms.    Follow up: 6 months and prn     For:  Physical Examination and MGM (D) at

## 2025-03-07 ENCOUNTER — APPOINTMENT (OUTPATIENT)
Dept: RADIOLOGY | Facility: MEDICAL CENTER | Age: 25
DRG: 835 | End: 2025-03-07
Attending: PEDIATRICS
Payer: COMMERCIAL

## 2025-03-07 LAB
ALBUMIN SERPL BCP-MCNC: 3.6 G/DL (ref 3.2–4.9)
ALBUMIN/GLOB SERPL: 1.7 G/DL
ALP SERPL-CCNC: 75 U/L (ref 30–99)
ALT SERPL-CCNC: 17 U/L (ref 2–50)
ANION GAP SERPL CALC-SCNC: 10 MMOL/L (ref 7–16)
ANISOCYTOSIS BLD QL SMEAR: ABNORMAL
AST SERPL-CCNC: 12 U/L (ref 12–45)
BASOPHILS # BLD AUTO: 0 % (ref 0–1.8)
BASOPHILS # BLD: 0 K/UL (ref 0–0.12)
BILIRUB SERPL-MCNC: 0.3 MG/DL (ref 0.1–1.5)
BUN SERPL-MCNC: 18 MG/DL (ref 8–22)
CALCIUM ALBUM COR SERPL-MCNC: 8.2 MG/DL (ref 8.5–10.5)
CALCIUM SERPL-MCNC: 7.9 MG/DL (ref 8.5–10.5)
CHLORIDE SERPL-SCNC: 107 MMOL/L (ref 96–112)
CO2 SERPL-SCNC: 22 MMOL/L (ref 20–33)
COMMENT NL1176: ABNORMAL
CREAT SERPL-MCNC: 0.73 MG/DL (ref 0.5–1.4)
EOSINOPHIL # BLD AUTO: 0 K/UL (ref 0–0.51)
EOSINOPHIL NFR BLD: 0 % (ref 0–6.9)
ERYTHROCYTE [DISTWIDTH] IN BLOOD BY AUTOMATED COUNT: 32 FL (ref 35.9–50)
GFR SERPLBLD CREATININE-BSD FMLA CKD-EPI: 131 ML/MIN/1.73 M 2
GLOBULIN SER CALC-MCNC: 2.1 G/DL (ref 1.9–3.5)
GLUCOSE SERPL-MCNC: 146 MG/DL (ref 65–99)
HCT VFR BLD AUTO: 20.2 % (ref 42–52)
HGB BLD-MCNC: 7.5 G/DL (ref 14–18)
LYMPHOCYTES # BLD AUTO: 0.88 K/UL (ref 1–4.8)
LYMPHOCYTES NFR BLD: 88.2 % (ref 22–41)
MANUAL DIFF BLD: ABNORMAL
MCH RBC QN AUTO: 28.4 PG (ref 27–33)
MCHC RBC AUTO-ENTMCNC: 37.1 G/DL (ref 32.3–36.5)
MCV RBC AUTO: 76.5 FL (ref 81.4–97.8)
MICROCYTES BLD QL SMEAR: ABNORMAL
MONOCYTES # BLD AUTO: 0 K/UL (ref 0–0.85)
MONOCYTES NFR BLD AUTO: 0 % (ref 0–13.4)
NEUTROPHILS # BLD AUTO: 0.1 K/UL (ref 1.82–7.42)
NEUTROPHILS NFR BLD: 9.8 % (ref 44–72)
NEUTS BAND NFR BLD MANUAL: 2 % (ref 0–10)
PHOSPHATE SERPL-MCNC: 2.9 MG/DL (ref 2.5–4.5)
PLATELET # BLD AUTO: 43 K/UL (ref 164–446)
PLATELET BLD QL SMEAR: NORMAL
PLATELETS.RETICULATED NFR BLD AUTO: 0.9 % (ref 0.6–13.1)
PMV BLD AUTO: 9.8 FL (ref 9–12.9)
POTASSIUM SERPL-SCNC: 3.8 MMOL/L (ref 3.6–5.5)
PROT SERPL-MCNC: 5.7 G/DL (ref 6–8.2)
RBC # BLD AUTO: 2.64 M/UL (ref 4.7–6.1)
RBC BLD AUTO: PRESENT
SMUDGE CELLS BLD QL SMEAR: ABNORMAL
SODIUM SERPL-SCNC: 139 MMOL/L (ref 135–145)
URATE SERPL-MCNC: 1.4 MG/DL (ref 2.5–8.3)
WBC # BLD AUTO: 1 K/UL (ref 4.8–10.8)

## 2025-03-07 PROCEDURE — 84100 ASSAY OF PHOSPHORUS: CPT

## 2025-03-07 PROCEDURE — 84550 ASSAY OF BLOOD/URIC ACID: CPT

## 2025-03-07 PROCEDURE — 99233 SBSQ HOSP IP/OBS HIGH 50: CPT | Performed by: PEDIATRICS

## 2025-03-07 PROCEDURE — 700102 HCHG RX REV CODE 250 W/ 637 OVERRIDE(OP): Performed by: PEDIATRICS

## 2025-03-07 PROCEDURE — 85007 BL SMEAR W/DIFF WBC COUNT: CPT

## 2025-03-07 PROCEDURE — 700111 HCHG RX REV CODE 636 W/ 250 OVERRIDE (IP): Performed by: PEDIATRICS

## 2025-03-07 PROCEDURE — 700105 HCHG RX REV CODE 258: Performed by: PEDIATRICS

## 2025-03-07 PROCEDURE — 71045 X-RAY EXAM CHEST 1 VIEW: CPT

## 2025-03-07 PROCEDURE — 85027 COMPLETE CBC AUTOMATED: CPT

## 2025-03-07 PROCEDURE — 80053 COMPREHEN METABOLIC PANEL: CPT

## 2025-03-07 PROCEDURE — 770004 HCHG ROOM/CARE - ONCOLOGY PRIVATE *

## 2025-03-07 PROCEDURE — A9270 NON-COVERED ITEM OR SERVICE: HCPCS | Performed by: PEDIATRICS

## 2025-03-07 PROCEDURE — 85055 RETICULATED PLATELET ASSAY: CPT

## 2025-03-07 RX ADMIN — PREDNISONE 60 MG: 50 TABLET ORAL at 05:05

## 2025-03-07 RX ADMIN — MUPIROCIN 1 APPLICATION: 20 OINTMENT TOPICAL at 17:52

## 2025-03-07 RX ADMIN — ALLOPURINOL 200 MG: 100 TABLET ORAL at 16:49

## 2025-03-07 RX ADMIN — MUPIROCIN 2 %: 20 OINTMENT TOPICAL at 05:05

## 2025-03-07 RX ADMIN — SODIUM CHLORIDE: 9 INJECTION, SOLUTION INTRAVENOUS at 06:43

## 2025-03-07 RX ADMIN — SODIUM CHLORIDE: 9 INJECTION, SOLUTION INTRAVENOUS at 21:04

## 2025-03-07 RX ADMIN — IMATINIB MESYLATE 600 MG: 400 TABLET ORAL at 05:06

## 2025-03-07 RX ADMIN — CEFEPIME 2 G: 2 INJECTION, POWDER, FOR SOLUTION INTRAVENOUS at 05:05

## 2025-03-07 RX ADMIN — FAMOTIDINE 40 MG: 20 TABLET, FILM COATED ORAL at 05:05

## 2025-03-07 RX ADMIN — CEFEPIME 2 G: 2 INJECTION, POWDER, FOR SOLUTION INTRAVENOUS at 21:58

## 2025-03-07 RX ADMIN — ONDANSETRON 8 MG: 2 INJECTION INTRAMUSCULAR; INTRAVENOUS at 15:02

## 2025-03-07 RX ADMIN — CEFEPIME 2 G: 2 INJECTION, POWDER, FOR SOLUTION INTRAVENOUS at 15:03

## 2025-03-07 RX ADMIN — ALLOPURINOL 200 MG: 100 TABLET ORAL at 11:35

## 2025-03-07 RX ADMIN — ONDANSETRON 8 MG: 2 INJECTION INTRAMUSCULAR; INTRAVENOUS at 05:05

## 2025-03-07 RX ADMIN — MUPIROCIN 1 APPLICATION: 20 OINTMENT TOPICAL at 11:35

## 2025-03-07 RX ADMIN — PREDNISONE 60 MG: 50 TABLET ORAL at 16:49

## 2025-03-07 RX ADMIN — ALLOPURINOL 200 MG: 100 TABLET ORAL at 05:05

## 2025-03-07 RX ADMIN — FAMOTIDINE 40 MG: 20 TABLET, FILM COATED ORAL at 16:49

## 2025-03-07 RX ADMIN — ONDANSETRON 8 MG: 2 INJECTION INTRAMUSCULAR; INTRAVENOUS at 21:57

## 2025-03-07 ASSESSMENT — PAIN DESCRIPTION - PAIN TYPE: TYPE: ACUTE PAIN

## 2025-03-07 NOTE — PROCEDURES
Pediatric Oncology Lumbar Puncture  Procedure Note      Patient Name:  Tomas Jean-Baptiste  : 2000   MRN: [id number]    Service Location:  [facility]  Date of Service: 3/6/2025  Time: 4:41 PM    Procedure Performed By: Pepe Faye M.D.    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 male diagnosed with Ph+ Acute B-Lymphoblastic Leukemia (C91.02) in relapse.  He remains hospitalized for fever and neutropenia as well as initiation of salvage therapy.  Today is Day 1 of 3-Drug Induction and scheduled lumbar puncture with intrathecal chemotherapy.  Prior to the procedure, the risks and benefits were discussed with the patient. All pertinent labs and history were reviewed and a complete History and Physical Examination were performed and placed in the medical record.  Tomas Roy remained in his room where the procedure was performed.  All necessary safety equipment per ASA guidelines were available.  A time-out was performed and the patient identified by name,  and medical record number.  Gloves and mask were worn during the entire procedure.  Tomas Roy was prepped and draped in the usual sterile fashion with povoiodine.  he was positioned in the left decubitus position and all landmarks including superior posterior iliac crest, umbilicus and vertebral bodies were identified by palpation.  A 3.5 in, 22 gauge  spinal needle was introduced into the L3-L4 spinal interspace.  6 ml of clear fluid were obtained and sent for cell count and cytology.  Methotrexate 15 mg in 6 mL of NS was verified with the nurse bedside and then then administered into the spinal fluid. Tomas Roy tolerated the procedure without complication or bleeding.  He and his parents were instructed that he lie flat for 1 hour following the procedure.    Results:    PENDING    Pepe Faye MD  Pediatric Hematology / Oncology  [facility]  Cell.  [phone]

## 2025-03-07 NOTE — CARE PLAN
The patient is Watcher - Medium risk of patient condition declining or worsening    Shift Goals  Clinical Goals: remain afebrile, monitor labs, safety  Patient Goals: POC updates, rest,  Family Goals: None Present    Progress made toward(s) clinical / shift goals:      Problem: Knowledge Deficit - Standard  Goal: Patient and family/care givers will demonstrate understanding of plan of care, disease process/condition, diagnostic tests and medications  Outcome: Progressing     Problem: Neutropenia Precautions  Goal: Neutropenic precautions will be implemented and maintained for patient protection  Outcome: Progressing     Problem: Fall Risk  Goal: Patient will remain free from falls  Outcome: Progressing

## 2025-03-07 NOTE — PROGRESS NOTES
"Pediatric Hematology/Oncology  Daily Progress Note      Patient Name:  Tomas Jean-Baptiste  : 2000  MRN: [id number]    Location of Service:  [facility]  Date of Service: 3/6/2025  Time: 9:00 AM    Hospital Day: 7    Protocol / Treatment Plan:  Relapsed Leukemia, Individualized 3-Drug Re-Induction    SUBJECTIVE:     No acute events overnight.  Afebrile with Tmax 98.2F.  No complaints of any headaches, changes in vision or neurologic status changes.  Not currently with any pain.  No nausea, vomiting, diarrhea or constipation.  No easy bleeding or bruising.  No skin changes or rashes.  Finger and arm are nearly healed.  Tomas Roy denies any difficulty breathing.  No cough.  No other concerns or complaints at this time.    Review of Systems:     Constitutional: Afebrile, feeling well overall.  Good appetite and oral intake.  HENT: Negative for auditory changes or ear pain, no nosebleeds, no congestion, no rhinorrhea, no sore throat.  No mouth sores.  Eyes: Negative for visual changes.  Respiratory: Negative for shortness of breath.  Cardiovascular: Negative.  Gastrointestinal: Negative for nausea, vomiting, abdominal pain, diarrhea, constipation.  Genitourinary: Negative.  Musculoskeletal: Negative for arm pain or leg pain.    Skin: Negative for rash or skin infection.  Neurological: Negative for numbness, tingling, sensory changes, weakness or headaches.    Endo/Heme/Allergies: No bruising/bleeding easily.    Psychiatric/Behavioral: Stable mood.     OBJECTIVE:     Max Temp: Temp (24hrs), Av.6 °C (97.8 °F), Min:36.3 °C (97.3 °F), Max:36.8 °C (98.2 °F)    Vitals: /62   Pulse 67   Temp 36.6 °C (97.8 °F) (Oral)   Resp 16   Ht 1.651 m (5' 5\")   Wt 75.7 kg (166 lb 14.2 oz)   SpO2 98%   BMI 27.77 kg/m²     I/O:   Intake/Output Summary (Last 24 hours) at 3/6/2025 1924  Last data filed at 3/6/2025 1604  Gross per 24 hour   Intake 284 ml   Output --   Net 284 ml "     Labs:   Latest Reference Range & Units 03/06/25 00:00   WBC 4.8 - 10.8 K/uL 0.8 (LL)   RBC 4.70 - 6.10 M/uL 2.71 (L)   Hemoglobin 14.0 - 18.0 g/dL 7.5 (L)   Hematocrit 42.0 - 52.0 % 21.4 (L)   MCV 81.4 - 97.8 fL 79.0 (L)   MCH 27.0 - 33.0 pg 27.7   MCHC 32.3 - 36.5 g/dL 35.0   RDW 35.9 - 50.0 fL 32.2 (L)   Platelet Count 164 - 446 K/uL 11 (LL)   MPV 9.0 - 12.9 fL 10.5   Neutrophils-Polys 44.00 - 72.00 % 5.90 (L)   Neutrophils (Absolute) 1.82 - 7.42 K/uL 0.05 (LL)   Lymphocytes 22.00 - 41.00 % 91.70 (H)   Lymphs (Absolute) 1.00 - 4.80 K/uL 0.73 (L)   Monocytes 0.00 - 13.40 % 1.80   Monos (Absolute) 0.00 - 0.85 K/uL 0.01   Eosinophils 0.00 - 6.90 % 0.00   Eos (Absolute) 0.00 - 0.51 K/uL 0.00   Basophils 0.00 - 1.80 % 0.00   Baso (Absolute) 0.00 - 0.12 K/uL 0.00   Blasts % 0.60 (HH)   Plt Estimation  Decreased   Imm. Plt Fraction 0.6 - 13.1 % 2.3   RBC Morphology  Present   Anisocytosis  2+ !   Microcytosis  2+ !   Manual Diff Status  PERFORMED   Sodium 135 - 145 mmol/L 138   Potassium 3.6 - 5.5 mmol/L 4.3   Chloride 96 - 112 mmol/L 107   Co2 20 - 33 mmol/L 23   Anion Gap 7.0 - 16.0  8.0   Glucose 65 - 99 mg/dL 152 (H)   Bun 8 - 22 mg/dL 15   Creatinine 0.50 - 1.40 mg/dL 0.61   GFR (CKD-EPI) >60 mL/min/1.73 m 2 138   Calcium 8.5 - 10.5 mg/dL 8.2 (L)   Correct Calcium 8.5 - 10.5 mg/dL 8.4 (L)   AST(SGOT) 12 - 45 U/L 10 (L)   ALT(SGPT) 2 - 50 U/L 13   Alkaline Phosphatase 30 - 99 U/L 72   Total Bilirubin 0.1 - 1.5 mg/dL 0.3   Albumin 3.2 - 4.9 g/dL 3.7   Total Protein 6.0 - 8.2 g/dL 5.9 (L)   Globulin 1.9 - 3.5 g/dL 2.2   A-G Ratio g/dL 1.7   (LL): Data is critically low  (HH): Data is critically high  (L): Data is abnormally low  (H): Data is abnormally high  !: Data is abnormal    Physical Exam:    Constitutional: Overall well appearing.   HENT: Normocephalic and atraumatic.  No rhinorrhea. Oropharynx is clear and moist.   Eyes: Conjunctivae are normal. EOMI. Non-icteric. Pupils equal and round.  Neck: Normal range  of motion of neck, no adenopathy.    Cardiovascular: Regular rate, regular rhythm.  No murmur. DP/radial pulses 2+, cap refill < 2 sec.  Pulmonary/Chest: Effort normal. No respiratory distress. Symmetric expansion.  No crackles or wheezes.  Abdomen: Soft. Bowel sounds are normal. No distension and no mass. There is no hepatosplenomegaly.    Genitourinary:  Deferred.  Musculoskeletal: Normal range of motion of lower and upper extremities bilaterally.   Neurological: Alert and oriented to person and place. Exhibits normal muscle tone bilaterally in upper and lower extremities. Gait not assessed this morning. Coordination normal.    Skin: Improved erythema of right distal fourth phalanx again today does have desquamation today in the area of previous erythema.  Right forearm with Bactroban ointment, stable rash.  CVL surgical sites C/D/I  Psychiatric: Mood is stable.      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, received cranial radiation 6/5/2023 to 6/16/2023              - Testicular disease negative at diagnosis              - Several complications throughout therapy to include severe septic shock 12/27/2022 while in Delayed Intensification              - Completed therapy 5/30/2024              - Last seen in clinic on 1/15/2025 without any evidence of relapse (clinical/laboratory)              - Reported viral URI symptoms approximately 2.5 weeks prior to presentation               - Interval resolution of viral URI symptoms followed by very acute worsening of flulike symptoms as  well as aches and pains              - Seen in clinic 2/27/2025:    - WBC 1000 cells/uL, Hgb 10.6 g/dL, platelets 53,000 cells/uL              - ANC 10, , absolute monocyte count 20                 - Precipitous drop of counts over the past month with context of low-grade temperatures and bone pain most consistent with relapsed leukemia                 - Admission for Fever and Neutropenia 2/27/2025                 - Double-lumen Broviac line placement, diagnostic bone marrow evaluation to include aspirate and biopsy, flow cytometry and FISH to confirm relapse at bedside 2/28/2025  - Testicular negative at relapse  - CSF negative consistent with CNS1  - Confirmed 13% blasts in hemodilute BMA specimen by flow  - Confirmed BCR-ABL1 fusion in 17% cells by FISH              - Have already reached out to transplant colleagues to begin planning allogeneic transplant.                 - After discussing multiple options, will propose to patient a 3-Drug Re-Induction (VCR/PRED/PEG-ASP) + Imatinib followed by blinatumomab     Individualized Salvage Therapy, 3-Drug Re-Induction, Day 1:   ** Methotrexate 15 mg IT x 1 on Days 1 (TODAY, see separate procedure note), 8, and 29 (will discuss with transplant team their thought on number of LP during salvage therapy as disease is isolated marrow relapse)   ** Vincristine 2 mg IV x 1 on Days 1 (TODAY), 8, 15 and 22   ** Prednisone 30 mg/m2/dose BID x 28 days    ** PEG-Aspargase 2000 IU/m2 x 1 on Day 4     2) Fever and Neutropenia:              - ANC 10 2/27/2025              - Fever to 101.6 °F 2/27/2025              - Paronychia of the fourth distal phalanx of the right hand as possible source of infection - greatly improved since starting antibiotics              - Admission to [facility] 2/27/2025              - Given clinical stability, no fluid bolus administered              - Blood culture obtained peripherally (no central access) NGTD              - Ordered HSV studies on  blood given HSV 1 positive titers previously as well as fourth distal phalanx paronychia NOT DETECTED              - Cefepime 2 g IV Q8 hours started empirically              - Tylenol 650 mg PO Q6 hours for fever              - Supportive care of symptoms     3) Paronychia: (IMPROVED)              - Paronychia of the right fourth distal phalanx with intense erythema              - 3-4 x daily warm water bath soaks              - Mupirocin ointment topically              - Cefepime as above              - Bloodstream without evidence of HSV infection     4) 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:              - Continue hydration with normal saline               - Allopurinol 200 mg PO TID              - Daily TLS labs      - K+ 4.3, Ca++ 8.2, Uric Acid 1.5, phosphorus 3.1 today    5) Disease Related Pancytopenia:              -  cells/uL, Hgb 7.5 g/dL, platelets 11,000 cells/uL              - ANC 50, , absolute monocyte count 10  BLASTS: 0.6%              - Transfuse irradiated PRBC for Hgb<7 g/dL or symptomatic              - Transfuse irradiated platelets for platelet count of <10,000 cells/uL or symptomatic                 - Platelet transfusion today in anticipation of lumbar puncture     - CBC daily     6) Vascular Access:              - None currently              - Double-lumen CVL placed 2/28/2025     7) Psychosocial:   - Dr. Ortega seeing     8) Social:              - Referred  to Social Work and financial navigator     Disposition: Inpatient for fever and neutropenia, workup of presumed relapsed disease, referral to transplant - initiation of re-induction therapy.    Pepe Faye MD  Pediatric Hematology / Oncology  [facility]  Cell.  [phone]  [facility]. [phone]

## 2025-03-07 NOTE — CARE PLAN
Problem: Knowledge Deficit - Standard  Goal: Patient and family/care givers will demonstrate understanding of plan of care, disease process/condition, diagnostic tests and medications  Outcome: Progressing     Problem: Neutropenia Precautions  Goal: Neutropenic precautions will be implemented and maintained for patient protection  Outcome: Progressing   Pt neutropenic, afebrile.    Problem: Fall Risk  Goal: Patient will remain free from falls  Outcome: Progressing  Pt up self, steady gait, refuses bed alarm.      The patient is Watcher - Medium risk of patient condition declining or worsening    Shift Goals  Clinical Goals: pt remian afebrile  Patient Goals: POC updates, rest,  Family Goals: None Present    Progress made toward(s) clinical / shift goals:      Patient is not progressing towards the following goals:

## 2025-03-07 NOTE — DISCHARGE PLANNING
Case Management Discharge Planning    Admission Date: 2/27/2025  GMLOS: 4.4  ALOS: 8    6-Clicks ADL Score: 24  6-Clicks Mobility Score: 24      Anticipated Discharge Dispo: Discharge Disposition: Discharged to home/self care (01)    DME Needed: No    Action(s) Taken: Patient received day 1 of chemotherapy yesterday. RN[facility]. RNCM placed call to [facility] who stated patient is scheduled for chemotherapy on Monday. Pending confirmation from pharmacy if patient needs chemotherapy on Sunday or Monday.   1502: Patient is scheduled for day 4 chemotherapy at [facility] on Yfn 3/9 at 0800.     Escalations Completed: None    Medically Clear: No    Next Steps: pending medical clearance     Barriers to Discharge: Medical clearance    Is the patient up for discharge tomorrow: No

## 2025-03-08 DIAGNOSIS — C91.Z0 B LYMPHOBLASTIC LEUKEMIA WITH T(9;22)(Q34;Q11.2);BCR-ABL1 (HCC): ICD-10-CM

## 2025-03-08 PROBLEM — R50.81 FEVER AND NEUTROPENIA (HCC): Status: RESOLVED | Noted: 2025-02-27 | Resolved: 2025-03-08

## 2025-03-08 PROBLEM — D70.9 FEVER AND NEUTROPENIA (HCC): Status: RESOLVED | Noted: 2025-02-27 | Resolved: 2025-03-08

## 2025-03-08 LAB
ALBUMIN SERPL BCP-MCNC: 3.6 G/DL (ref 3.2–4.9)
ALBUMIN/GLOB SERPL: 1.6 G/DL
ALP SERPL-CCNC: 73 U/L (ref 30–99)
ALT SERPL-CCNC: 19 U/L (ref 2–50)
ANION GAP SERPL CALC-SCNC: 8 MMOL/L (ref 7–16)
ANISOCYTOSIS BLD QL SMEAR: ABNORMAL
AST SERPL-CCNC: 13 U/L (ref 12–45)
BASOPHILS # BLD AUTO: 0 % (ref 0–1.8)
BASOPHILS # BLD: 0 K/UL (ref 0–0.12)
BILIRUB SERPL-MCNC: 0.3 MG/DL (ref 0.1–1.5)
BUN SERPL-MCNC: 15 MG/DL (ref 8–22)
CALCIUM ALBUM COR SERPL-MCNC: 8.8 MG/DL (ref 8.5–10.5)
CALCIUM SERPL-MCNC: 8.5 MG/DL (ref 8.5–10.5)
CHLORIDE SERPL-SCNC: 106 MMOL/L (ref 96–112)
CO2 SERPL-SCNC: 24 MMOL/L (ref 20–33)
COMMENT NL1176: ABNORMAL
CREAT SERPL-MCNC: 0.65 MG/DL (ref 0.5–1.4)
EOSINOPHIL # BLD AUTO: 0 K/UL (ref 0–0.51)
EOSINOPHIL NFR BLD: 0 % (ref 0–6.9)
ERYTHROCYTE [DISTWIDTH] IN BLOOD BY AUTOMATED COUNT: 31.4 FL (ref 35.9–50)
GFR SERPLBLD CREATININE-BSD FMLA CKD-EPI: 135 ML/MIN/1.73 M 2
GLOBULIN SER CALC-MCNC: 2.2 G/DL (ref 1.9–3.5)
GLUCOSE SERPL-MCNC: 160 MG/DL (ref 65–99)
HCT VFR BLD AUTO: 19 % (ref 42–52)
HGB BLD-MCNC: 6.8 G/DL (ref 14–18)
LYMPHOCYTES # BLD AUTO: 0.65 K/UL (ref 1–4.8)
LYMPHOCYTES NFR BLD: 80.9 % (ref 22–41)
MANUAL DIFF BLD: ABNORMAL
MCH RBC QN AUTO: 27.8 PG (ref 27–33)
MCHC RBC AUTO-ENTMCNC: 35.8 G/DL (ref 32.3–36.5)
MCV RBC AUTO: 77.6 FL (ref 81.4–97.8)
MICROCYTES BLD QL SMEAR: ABNORMAL
MONOCYTES # BLD AUTO: 0.02 K/UL (ref 0–0.85)
MONOCYTES NFR BLD AUTO: 2.6 % (ref 0–13.4)
NEUTROPHILS # BLD AUTO: 0.13 K/UL (ref 1.82–7.42)
NEUTROPHILS NFR BLD: 16.5 % (ref 44–72)
PHOSPHATE SERPL-MCNC: 2.8 MG/DL (ref 2.5–4.5)
PLATELET # BLD AUTO: 26 K/UL (ref 164–446)
PLATELET BLD QL SMEAR: NORMAL
PLATELETS.RETICULATED NFR BLD AUTO: 1.5 % (ref 0.6–13.1)
PMV BLD AUTO: 10.2 FL (ref 9–12.9)
POTASSIUM SERPL-SCNC: 3.9 MMOL/L (ref 3.6–5.5)
PROT SERPL-MCNC: 5.8 G/DL (ref 6–8.2)
RBC # BLD AUTO: 2.45 M/UL (ref 4.7–6.1)
RBC BLD AUTO: PRESENT
SMUDGE CELLS BLD QL SMEAR: ABNORMAL
SODIUM SERPL-SCNC: 138 MMOL/L (ref 135–145)
URATE SERPL-MCNC: 1.5 MG/DL (ref 2.5–8.3)
WBC # BLD AUTO: 0.8 K/UL (ref 4.8–10.8)

## 2025-03-08 PROCEDURE — P9016 RBC LEUKOCYTES REDUCED: HCPCS

## 2025-03-08 PROCEDURE — 99233 SBSQ HOSP IP/OBS HIGH 50: CPT | Performed by: PEDIATRICS

## 2025-03-08 PROCEDURE — 85007 BL SMEAR W/DIFF WBC COUNT: CPT

## 2025-03-08 PROCEDURE — 700111 HCHG RX REV CODE 636 W/ 250 OVERRIDE (IP): Performed by: PEDIATRICS

## 2025-03-08 PROCEDURE — 80053 COMPREHEN METABOLIC PANEL: CPT

## 2025-03-08 PROCEDURE — RXMED WILLOW AMBULATORY MEDICATION CHARGE: Performed by: PEDIATRICS

## 2025-03-08 PROCEDURE — 84100 ASSAY OF PHOSPHORUS: CPT

## 2025-03-08 PROCEDURE — 700102 HCHG RX REV CODE 250 W/ 637 OVERRIDE(OP): Performed by: PEDIATRICS

## 2025-03-08 PROCEDURE — 86923 COMPATIBILITY TEST ELECTRIC: CPT

## 2025-03-08 PROCEDURE — 85027 COMPLETE CBC AUTOMATED: CPT

## 2025-03-08 PROCEDURE — 700105 HCHG RX REV CODE 258: Performed by: PEDIATRICS

## 2025-03-08 PROCEDURE — 86945 BLOOD PRODUCT/IRRADIATION: CPT

## 2025-03-08 PROCEDURE — 770004 HCHG ROOM/CARE - ONCOLOGY PRIVATE *

## 2025-03-08 PROCEDURE — 36430 TRANSFUSION BLD/BLD COMPNT: CPT

## 2025-03-08 PROCEDURE — 85055 RETICULATED PLATELET ASSAY: CPT

## 2025-03-08 PROCEDURE — A9270 NON-COVERED ITEM OR SERVICE: HCPCS | Performed by: PEDIATRICS

## 2025-03-08 PROCEDURE — 84550 ASSAY OF BLOOD/URIC ACID: CPT

## 2025-03-08 RX ORDER — ONDANSETRON 2 MG/ML
8 INJECTION INTRAMUSCULAR; INTRAVENOUS EVERY 8 HOURS PRN
Status: DISCONTINUED | OUTPATIENT
Start: 2025-03-08 | End: 2025-03-09 | Stop reason: HOSPADM

## 2025-03-08 RX ORDER — SULFAMETHOXAZOLE AND TRIMETHOPRIM 800; 160 MG/1; MG/1
1 TABLET ORAL 2 TIMES DAILY
Qty: 16 TABLET | Refills: 3 | Status: ON HOLD | OUTPATIENT
Start: 2025-03-08

## 2025-03-08 RX ORDER — FAMOTIDINE 20 MG/1
20 TABLET, FILM COATED ORAL 2 TIMES DAILY
Qty: 60 TABLET | Refills: 0 | Status: ON HOLD | OUTPATIENT
Start: 2025-03-08

## 2025-03-08 RX ORDER — PREDNISONE 20 MG/1
60 TABLET ORAL 2 TIMES DAILY
Qty: 30 TABLET | Refills: 0 | Status: SHIPPED | OUTPATIENT
Start: 2025-03-08 | End: 2025-03-11 | Stop reason: SDUPTHER

## 2025-03-08 RX ORDER — IMATINIB MESYLATE 400 MG/1
600 TABLET, FILM COATED ORAL EVERY MORNING
Qty: 45 TABLET | Refills: 0 | Status: SHIPPED | OUTPATIENT
Start: 2025-03-08 | End: 2025-03-11 | Stop reason: SDUPTHER

## 2025-03-08 RX ORDER — CEPHALEXIN 500 MG/1
500 CAPSULE ORAL 3 TIMES DAILY
Qty: 21 CAPSULE | Refills: 0 | Status: ACTIVE | OUTPATIENT
Start: 2025-03-08 | End: 2025-03-16

## 2025-03-08 RX ADMIN — ONDANSETRON 8 MG: 2 INJECTION INTRAMUSCULAR; INTRAVENOUS at 05:13

## 2025-03-08 RX ADMIN — ALLOPURINOL 200 MG: 100 TABLET ORAL at 12:37

## 2025-03-08 RX ADMIN — SULFAMETHOXAZOLE AND TRIMETHOPRIM 2 TABLET: 400; 80 TABLET ORAL at 21:35

## 2025-03-08 RX ADMIN — FAMOTIDINE 40 MG: 20 TABLET, FILM COATED ORAL at 05:14

## 2025-03-08 RX ADMIN — CEFEPIME 2 G: 2 INJECTION, POWDER, FOR SOLUTION INTRAVENOUS at 15:13

## 2025-03-08 RX ADMIN — MUPIROCIN 1 APPLICATION: 20 OINTMENT TOPICAL at 12:37

## 2025-03-08 RX ADMIN — FAMOTIDINE 40 MG: 20 TABLET, FILM COATED ORAL at 17:24

## 2025-03-08 RX ADMIN — IMATINIB MESYLATE 600 MG: 400 TABLET ORAL at 05:14

## 2025-03-08 RX ADMIN — ONDANSETRON 8 MG: 2 INJECTION INTRAMUSCULAR; INTRAVENOUS at 21:32

## 2025-03-08 RX ADMIN — MUPIROCIN 2 %: 20 OINTMENT TOPICAL at 05:14

## 2025-03-08 RX ADMIN — ONDANSETRON 8 MG: 2 INJECTION INTRAMUSCULAR; INTRAVENOUS at 15:21

## 2025-03-08 RX ADMIN — PREDNISONE 60 MG: 50 TABLET ORAL at 17:24

## 2025-03-08 RX ADMIN — PREDNISONE 60 MG: 50 TABLET ORAL at 05:13

## 2025-03-08 RX ADMIN — ALLOPURINOL 200 MG: 100 TABLET ORAL at 17:24

## 2025-03-08 RX ADMIN — CEFEPIME 2 G: 2 INJECTION, POWDER, FOR SOLUTION INTRAVENOUS at 21:35

## 2025-03-08 RX ADMIN — CEFEPIME 2 G: 2 INJECTION, POWDER, FOR SOLUTION INTRAVENOUS at 05:26

## 2025-03-08 RX ADMIN — SULFAMETHOXAZOLE AND TRIMETHOPRIM 2 TABLET: 400; 80 TABLET ORAL at 09:09

## 2025-03-08 RX ADMIN — ALLOPURINOL 200 MG: 100 TABLET ORAL at 05:14

## 2025-03-08 RX ADMIN — MUPIROCIN 1 APPLICATION: 20 OINTMENT TOPICAL at 17:26

## 2025-03-08 ASSESSMENT — PAIN DESCRIPTION - PAIN TYPE: TYPE: ACUTE PAIN

## 2025-03-08 NOTE — CARE PLAN
The patient is Watcher - Medium risk of patient condition declining or worsening    Shift Goals  Clinical Goals: remain afebrile, safety, monitor labs  Patient Goals: rest, POC updates  Family Goals: POC updates    Progress made toward(s) clinical / shift goals:      Problem: Knowledge Deficit - Standard  Goal: Patient and family/care givers will demonstrate understanding of plan of care, disease process/condition, diagnostic tests and medications  Outcome: Progressing     Problem: Neutropenia Precautions  Goal: Neutropenic precautions will be implemented and maintained for patient protection  Outcome: Progressing     Problem: Fall Risk  Goal: Patient will remain free from falls  Outcome: Progressing

## 2025-03-08 NOTE — CARE PLAN
Problem: Knowledge Deficit - Standard  Goal: Patient and family/care givers will demonstrate understanding of plan of care, disease process/condition, diagnostic tests and medications  Outcome: Progressing   Pt updated about POC.    Problem: Neutropenia Precautions  Goal: Neutropenic precautions will be implemented and maintained for patient protection  Outcome: Progressing   Pt afebrile,  neutropenic.     Problem: Fall Risk  Goal: Patient will remain free from falls  Outcome: Progressing  Bed alarm on. Pt calls appropriately.      The patient is Watcher - Medium risk of patient condition declining or worsening    Shift Goals  Clinical Goals: Pt will tolerate RBC transfusion  Patient Goals: rest, POC updates  Family Goals: POC updates    Progress made toward(s) clinical / shift goals:      Patient is not progressing towards the following goals:    Pt AOx4. Pt denies pain, nausea and SOB. Central line patent with positive blood return running RBC. Pt up with standby assist.

## 2025-03-08 NOTE — PROGRESS NOTES
"Pediatric Hematology/Oncology  Daily Progress Note      Patient Name:  Tomas Jean-Baptiste  : 2000  MRN: [id number]    Location of Service:  [facility]  Date of Service: 3/7/2025  Time: 11:00 AM    Hospital Day: 8    Protocol / Treatment Plan:  Relapsed Leukemia, Individualized 3-Drug Re-Induction, Day 2    SUBJECTIVE:     Bradycardia overnight.  Afebrile with Tmax 98.2F.  Today, JULY complains of significant fatigue.  He reports having difficulty getting up from bed and is short of breath and dizzy when he does so.  Denies any headaches or neurologic changes.  No complaints of any cough or signes of respiratory illness.  No nausea, vomiting, diarrhea or constipation.  NO complaints of any aches or pains.  No complaints of any pallor.      Review of Systems:     Constitutional: Afebrile, today feels depleted, no longer with energy..  Good appetite and oral intake.  HENT: Negative for auditory changes or ear pain, no nosebleeds, no congestion, no rhinorrhea, no sore throat.  No mouth sores.  Eyes: Negative for visual changes.  Respiratory: Having shortness of breath without cough.   Cardiovascular: Negative.  Gastrointestinal: Negative for nausea, vomiting, abdominal pain, diarrhea, constipation.  Genitourinary: Negative.  Musculoskeletal: Negative for arm pain or leg pain.    Skin: Negative for rash or skin infection.  Neurological: Negative for numbness, tingling, sensory changes, weakness or headaches.    Endo/Heme/Allergies: No bruising/bleeding easily.    Psychiatric/Behavioral: Stable mood.     OBJECTIVE:     Max Temp: Temp (24hrs), Av.7 °C (98 °F), Min:36.4 °C (97.6 °F), Max:36.8 °C (98.2 °F)    Vitals: /61   Pulse 86   Temp 36.8 °C (98.2 °F) (Oral)   Resp 18   Ht 1.651 m (5' 5\")   Wt 75.7 kg (166 lb 14.2 oz)   SpO2 97%   BMI 27.77 kg/m²     Labs:   Latest Reference Range & Units 25 00:00 25 08:48   WBC 4.8 - 10.8 K/uL 1.0 (LL)    RBC 4.70 - 6.10 " M/uL 2.64 (L)    Hemoglobin 14.0 - 18.0 g/dL 7.5 (L)    Hematocrit 42.0 - 52.0 % 20.2 (L)    MCV 81.4 - 97.8 fL 76.5 (L)    MCH 27.0 - 33.0 pg 28.4    MCHC 32.3 - 36.5 g/dL 37.1 (H)    RDW 35.9 - 50.0 fL 32.0 (L)    Platelet Count 164 - 446 K/uL 43 (L)    MPV 9.0 - 12.9 fL 9.8    Neutrophils-Polys 44.00 - 72.00 % 9.80 (L)    Neutrophils (Absolute) 1.82 - 7.42 K/uL 0.10 (LL)    Bands-Stabs 0.00 - 10.00 % 2.00    Lymphocytes 22.00 - 41.00 % 88.20 (H)    Lymphs (Absolute) 1.00 - 4.80 K/uL 0.88 (L)    Monocytes 0.00 - 13.40 % 0.00    Monos (Absolute) 0.00 - 0.85 K/uL 0.00    Eosinophils 0.00 - 6.90 % 0.00    Eos (Absolute) 0.00 - 0.51 K/uL 0.00    Basophils 0.00 - 1.80 % 0.00    Baso (Absolute) 0.00 - 0.12 K/uL 0.00    Plt Estimation  Decreased    Imm. Plt Fraction 0.6 - 13.1 % 0.9    RBC Morphology  Present    Anisocytosis  2+ !    Microcytosis  2+ !    Smudge Cells  Few    Manual Diff Status  PERFORMED    Comment  See Comment    Sodium 135 - 145 mmol/L 139    Potassium 3.6 - 5.5 mmol/L 3.8    Chloride 96 - 112 mmol/L 107    Co2 20 - 33 mmol/L 22    Anion Gap 7.0 - 16.0  10.0    Glucose 65 - 99 mg/dL 146 (H)    Bun 8 - 22 mg/dL 18    Creatinine 0.50 - 1.40 mg/dL 0.73    GFR (CKD-EPI) >60 mL/min/1.73 m 2 131    Calcium 8.5 - 10.5 mg/dL 7.9 (L)    Correct Calcium 8.5 - 10.5 mg/dL 8.2 (L)    AST(SGOT) 12 - 45 U/L 12    ALT(SGPT) 2 - 50 U/L 17    Alkaline Phosphatase 30 - 99 U/L 75    Total Bilirubin 0.1 - 1.5 mg/dL 0.3    Albumin 3.2 - 4.9 g/dL 3.6    Total Protein 6.0 - 8.2 g/dL 5.7 (L)    Globulin 1.9 - 3.5 g/dL 2.1    A-G Ratio g/dL 1.7    Phosphorus 2.5 - 4.5 mg/dL  2.9   Uric Acid 2.5 - 8.3 mg/dL  1.4 (L)   (LL): Data is critically low  (L): Data is abnormally low  (H): Data is abnormally high  !: Data is abnormal    Physical Exam:    Constitutional: Overall fatigued and ill.  Not toxic appearing.  HENT: Normocephalic and atraumatic.  No rhinorrhea. Oropharynx is clear and moist.   Eyes: Conjunctivae are normal.  EOMI. Non-icteric. Pupils equal and round.  Neck: Normal range of motion of neck, no adenopathy.    Cardiovascular: Regular rate, regular rhythm.  No murmur. DP/radial pulses 2+, cap refill < 2 sec.  Pulmonary/Chest: Effort normal. No respiratory distress. Symmetric expansion.  No crackles or wheezes.  Abdomen: Soft. Bowel sounds are normal. No distension and no mass. There is no hepatosplenomegaly.    Genitourinary:  Deferred.  Musculoskeletal: Normal range of motion of lower and upper extremities bilaterally.   Neurological: Alert and oriented to person and place. Exhibits normal muscle tone bilaterally in upper and lower extremities. Gait not assessed this morning. Coordination normal.    Skin: Improved erythema of right distal fourth phalanx again today does have desquamation today in the area of previous erythema.  Right forearm with Bactroban ointment, stable rash.  CVL surgical sites C/D/I  Psychiatric: Mood is stable.      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, received cranial radiation 6/5/2023 to 6/16/2023              - Testicular disease negative at diagnosis              - Several complications throughout therapy to include severe septic shock 12/27/2022 while in Delayed Intensification              - Completed therapy 5/30/2024              - Last seen in clinic on 1/15/2025 without any evidence of relapse (clinical/laboratory)              - Reported viral URI symptoms approximately 2.5 weeks prior to presentation               - Interval resolution of viral URI  symptoms followed by very acute worsening of flulike symptoms as well as aches and pains              - Seen in clinic 2/27/2025:    - WBC 1000 cells/uL, Hgb 10.6 g/dL, platelets 53,000 cells/uL              - ANC 10, , absolute monocyte count 20                 - Precipitous drop of counts over the past month with context of low-grade temperatures and bone pain most consistent with relapsed leukemia                 - Admission for Fever and Neutropenia 2/27/2025                 - Double-lumen Broviac line placement, diagnostic bone marrow evaluation to include aspirate and biopsy, flow cytometry and FISH to confirm relapse at bedside 2/28/2025  - Testicular negative at relapse  - CSF negative consistent with CNS1  - Confirmed 13% blasts in hemodilute BMA specimen by flow  - Confirmed BCR-ABL1 fusion in 17% cells by FISH              - Have already reached out to transplant colleagues to begin planning allogeneic transplant.                 - After discussing multiple options, will propose to patient a 3-Drug Re-Induction (VCR/PRED/PEG-ASP) + Imatinib followed by blinatumomab     Individualized Salvage Therapy, 3-Drug Re-Induction, Day 2:   ** Methotrexate 15 mg IT x 1 on Days 1 (COMPLETE, see separate procedure note), 8, and 29 (will discuss with transplant team their thought on number of LP during salvage therapy as disease is isolated marrow relapse)   ** Vincristine 2 mg IV x 1 on Days 1 (COMPLETE), 8, 15 and 22   ** Prednisone 30 mg/m2/dose BID x 28 days    ** PEG-Aspargase 2000 IU/m2 x 1 on Day 4 in [facility]     2) Fever and Neutropenia:              - ANC 10 2/27/2025              - Fever to 101.6 °F 2/27/2025              - Paronychia of the fourth distal phalanx of the right hand as possible source of infection - greatly improved since starting antibiotics              - Admission to [facility] 2/27/2025              - Given clinical stability, no fluid bolus administered              - Blood culture obtained  peripherally (no central access) NGTD              - Ordered HSV studies on blood given HSV 1 positive titers previously as well as fourth distal phalanx paronychia NOT DETECTED              - Cefepime 2 g IV Q8 hours started empirically              - Tylenol 650 mg PO Q6 hours for fever              - Supportive care of symptoms     3) Paronychia: (IMPROVED)              - Paronychia of the right fourth distal phalanx with intense erythema              - 3-4 x daily warm water bath soaks              - Mupirocin ointment topically              - Cefepime as above              - Bloodstream without evidence of HSV infection     4) 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:              - Continue hydration with normal saline               - Allopurinol 200 mg PO TID              - Daily TLS labs      - K+ 3.8, Ca++ 7.9, Uric Acid 1.4, phosphorus 2.9 today    5) Disease Related Pancytopenia:              - WBC 1000 cells/uL, Hgb 7.5 g/dL, platelets 60560 cells/uL              - , , absolute monocyte count 0  BLASTS: 0              - Transfuse irradiated PRBC for Hgb<7 g/dL or symptomatic              - Transfuse irradiated platelets for platelet count of <10,000 cells/uL or symptomatic                 - No transfusion today     - CBC daily     6) Vascular Access:              - None currently              - Double-lumen CVL placed 2/28/2025     7) Psychosocial:   - Dr. Ortega seeing     8) Social:              - Referred  to Social Work and financial navigator     Disposition: Inpatient for fever and neutropenia, workup of presumed relapsed disease, referral to transplant - initiation of re-induction therapy.    Pepe Faye MD  Pediatric Hematology / Oncology  [facility].  [phone]  [facility]. [phone]

## 2025-03-09 ENCOUNTER — OUTPATIENT INFUSION SERVICES (OUTPATIENT)
Dept: ONCOLOGY | Facility: MEDICAL CENTER | Age: 25
End: 2025-03-09
Attending: PEDIATRICS
Payer: COMMERCIAL

## 2025-03-09 ENCOUNTER — PHARMACY VISIT (OUTPATIENT)
Dept: PHARMACY | Facility: MEDICAL CENTER | Age: 25
End: 2025-03-09
Payer: COMMERCIAL

## 2025-03-09 VITALS
OXYGEN SATURATION: 98 % | TEMPERATURE: 97.8 F | WEIGHT: 166.89 LBS | HEART RATE: 84 BPM | HEIGHT: 65 IN | RESPIRATION RATE: 16 BRPM | DIASTOLIC BLOOD PRESSURE: 62 MMHG | SYSTOLIC BLOOD PRESSURE: 112 MMHG | BODY MASS INDEX: 27.81 KG/M2

## 2025-03-09 VITALS
OXYGEN SATURATION: 99 % | SYSTOLIC BLOOD PRESSURE: 114 MMHG | BODY MASS INDEX: 28.61 KG/M2 | RESPIRATION RATE: 18 BRPM | DIASTOLIC BLOOD PRESSURE: 69 MMHG | HEART RATE: 78 BPM | TEMPERATURE: 97.2 F | WEIGHT: 171.74 LBS | HEIGHT: 65 IN

## 2025-03-09 DIAGNOSIS — C91.Z0 B LYMPHOBLASTIC LEUKEMIA WITH T(9;22)(Q34;Q11.2);BCR-ABL1 (HCC): ICD-10-CM

## 2025-03-09 LAB
ALBUMIN SERPL BCP-MCNC: 3.7 G/DL (ref 3.2–4.9)
ALBUMIN/GLOB SERPL: 1.9 G/DL
ALP SERPL-CCNC: 69 U/L (ref 30–99)
ALT SERPL-CCNC: 34 U/L (ref 2–50)
ANION GAP SERPL CALC-SCNC: 11 MMOL/L (ref 7–16)
ANISOCYTOSIS BLD QL SMEAR: ABNORMAL
AST SERPL-CCNC: 16 U/L (ref 12–45)
BASOPHILS # BLD AUTO: 0 % (ref 0–1.8)
BASOPHILS # BLD: 0 K/UL (ref 0–0.12)
BILIRUB SERPL-MCNC: 0.3 MG/DL (ref 0.1–1.5)
BUN SERPL-MCNC: 16 MG/DL (ref 8–22)
CALCIUM ALBUM COR SERPL-MCNC: 8.3 MG/DL (ref 8.5–10.5)
CALCIUM SERPL-MCNC: 8.1 MG/DL (ref 8.5–10.5)
CHLORIDE SERPL-SCNC: 106 MMOL/L (ref 96–112)
CO2 SERPL-SCNC: 22 MMOL/L (ref 20–33)
CREAT SERPL-MCNC: 0.67 MG/DL (ref 0.5–1.4)
EOSINOPHIL # BLD AUTO: 0 K/UL (ref 0–0.51)
EOSINOPHIL NFR BLD: 0 % (ref 0–6.9)
ERYTHROCYTE [DISTWIDTH] IN BLOOD BY AUTOMATED COUNT: 32.4 FL (ref 35.9–50)
GFR SERPLBLD CREATININE-BSD FMLA CKD-EPI: 134 ML/MIN/1.73 M 2
GLOBULIN SER CALC-MCNC: 2 G/DL (ref 1.9–3.5)
GLUCOSE SERPL-MCNC: 159 MG/DL (ref 65–99)
HCT VFR BLD AUTO: 22.6 % (ref 42–52)
HGB BLD-MCNC: 8.1 G/DL (ref 14–18)
LYMPHOCYTES # BLD AUTO: 0.76 K/UL (ref 1–4.8)
LYMPHOCYTES NFR BLD: 75.9 % (ref 22–41)
MANUAL DIFF BLD: ABNORMAL
MCH RBC QN AUTO: 27.8 PG (ref 27–33)
MCHC RBC AUTO-ENTMCNC: 35.8 G/DL (ref 32.3–36.5)
MCV RBC AUTO: 77.7 FL (ref 81.4–97.8)
MICROCYTES BLD QL SMEAR: ABNORMAL
MONOCYTES # BLD AUTO: 0.01 K/UL (ref 0–0.85)
MONOCYTES NFR BLD AUTO: 0.9 % (ref 0–13.4)
NEUTROPHILS # BLD AUTO: 0.23 K/UL (ref 1.82–7.42)
NEUTROPHILS NFR BLD: 23.2 % (ref 44–72)
PLATELET # BLD AUTO: 17 K/UL (ref 164–446)
PLATELET BLD QL SMEAR: NORMAL
PLATELETS.RETICULATED NFR BLD AUTO: 2.1 % (ref 0.6–13.1)
PMV BLD AUTO: 10.8 FL (ref 9–12.9)
POTASSIUM SERPL-SCNC: 4.1 MMOL/L (ref 3.6–5.5)
PROT SERPL-MCNC: 5.7 G/DL (ref 6–8.2)
RBC # BLD AUTO: 2.91 M/UL (ref 4.7–6.1)
RBC BLD AUTO: PRESENT
SODIUM SERPL-SCNC: 139 MMOL/L (ref 135–145)
WBC # BLD AUTO: 1 K/UL (ref 4.8–10.8)

## 2025-03-09 PROCEDURE — 700111 HCHG RX REV CODE 636 W/ 250 OVERRIDE (IP): Mod: JZ | Performed by: PEDIATRICS

## 2025-03-09 PROCEDURE — 96415 CHEMO IV INFUSION ADDL HR: CPT

## 2025-03-09 PROCEDURE — 96375 TX/PRO/DX INJ NEW DRUG ADDON: CPT

## 2025-03-09 PROCEDURE — 96413 CHEMO IV INFUSION 1 HR: CPT

## 2025-03-09 PROCEDURE — 99239 HOSP IP/OBS DSCHRG MGMT >30: CPT | Performed by: PEDIATRICS

## 2025-03-09 PROCEDURE — 85007 BL SMEAR W/DIFF WBC COUNT: CPT

## 2025-03-09 PROCEDURE — 700102 HCHG RX REV CODE 250 W/ 637 OVERRIDE(OP): Performed by: PEDIATRICS

## 2025-03-09 PROCEDURE — 80053 COMPREHEN METABOLIC PANEL: CPT

## 2025-03-09 PROCEDURE — 85055 RETICULATED PLATELET ASSAY: CPT

## 2025-03-09 PROCEDURE — 700111 HCHG RX REV CODE 636 W/ 250 OVERRIDE (IP): Mod: UD | Performed by: PEDIATRICS

## 2025-03-09 PROCEDURE — 85027 COMPLETE CBC AUTOMATED: CPT

## 2025-03-09 PROCEDURE — A9270 NON-COVERED ITEM OR SERVICE: HCPCS | Performed by: PEDIATRICS

## 2025-03-09 PROCEDURE — 700105 HCHG RX REV CODE 258: Performed by: PEDIATRICS

## 2025-03-09 PROCEDURE — 700105 HCHG RX REV CODE 258: Mod: UD | Performed by: PEDIATRICS

## 2025-03-09 RX ORDER — DIPHENHYDRAMINE HYDROCHLORIDE 50 MG/ML
50 INJECTION INTRAMUSCULAR; INTRAVENOUS ONCE
Status: COMPLETED | OUTPATIENT
Start: 2025-03-09 | End: 2025-03-09

## 2025-03-09 RX ORDER — DIPHENHYDRAMINE HYDROCHLORIDE 50 MG/ML
50 INJECTION INTRAMUSCULAR; INTRAVENOUS PRN
Status: DISCONTINUED | OUTPATIENT
Start: 2025-03-09 | End: 2025-03-09 | Stop reason: HOSPADM

## 2025-03-09 RX ADMIN — CEFEPIME 2 G: 2 INJECTION, POWDER, FOR SOLUTION INTRAVENOUS at 06:07

## 2025-03-09 RX ADMIN — PEGASPARGASE 3720 UNITS: 750 INJECTION, SOLUTION INTRAMUSCULAR; INTRAVENOUS at 08:44

## 2025-03-09 RX ADMIN — PREDNISONE 60 MG: 50 TABLET ORAL at 06:09

## 2025-03-09 RX ADMIN — DIPHENHYDRAMINE HYDROCHLORIDE 50 MG: 50 INJECTION INTRAMUSCULAR; INTRAVENOUS at 08:22

## 2025-03-09 RX ADMIN — ALLOPURINOL 200 MG: 100 TABLET ORAL at 06:09

## 2025-03-09 RX ADMIN — IMATINIB MESYLATE 600 MG: 400 TABLET ORAL at 06:10

## 2025-03-09 RX ADMIN — MUPIROCIN 5 ML: 20 OINTMENT TOPICAL at 06:06

## 2025-03-09 RX ADMIN — FAMOTIDINE 40 MG: 20 TABLET, FILM COATED ORAL at 06:09

## 2025-03-09 RX ADMIN — ONDANSETRON 8 MG: 2 INJECTION INTRAMUSCULAR; INTRAVENOUS at 06:07

## 2025-03-09 ASSESSMENT — FIBROSIS 4 INDEX: FIB4 SCORE: 3.71

## 2025-03-09 NOTE — DISCHARGE SUMMARY
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 transfusion given a hemoglobin of 6.6 g/dL on 5/5/2023.   On 5/22/2023 JULY was seen in clinic for his Day 1 of Cycle 1 of Maintenance at which time he was started on oral 6-MP and methotrexate in addition to his daily imatinib dosing.  Height, weight and BSA were recalculated and all doses adjusted to meet with new biometric data. Tomas Roy was seen again on 6/19/2023 for his Day 29 of Cycle 1 of Maintenance.  No dose adjustments were necessary as ANC was within target range.  On 7/17/2023, Tomas Roy returned to clinic for his Day 57 of Cycle 1 of Maintenance at which point he was actually feeling quite well clinically. No dose adjustments were necessary however his counts had started to drop at that point with WBC 0.9 and ANC dipping to 590.  On 7/27/2023, present Tomas Roy to clinic with nausea, vomiting, abdominal discomfort as well as decreased fatigue.  Blood counts obtained at the time demonstrated a WBC of 0.4, Hgb 8.8 and platelets 125.  ANC at the  time was measured at only 170.  JULY was otherwise clinically well appearing.  He did receive a one-time normal saline bolus for his nausea and vomiting and was sent home with instructions to HOLD his oral mercaptopurine and oral methotrexate which began being held on 7/28/2023.  Per protocol, imatinib was continued at previous dose of 600 mg in the morning. Tomas Roy would return to clinic again on 8/2/2023 and 8/7/2023.  On both occasions, ANC remained under 500 and oral therapy (with the exception of imatinib) continue to be held while awaiting count recovery.  Ultimately, on 8/11/2023 after 14 days of therapy being held, Tomas Roy returned to clinic and WBC had increased to 2.1 with ANC increasing to 1500 with an absolute monocyte count of 290. Tomas Roy was then instructed to resume his oral chemotherapy at 100% of prior dosing with (due to timing with Day 1 of Cycle 2 with LP 3 days later, no weekly MTX was administered).  Imatinib was never held.  On 8/14/2023 he was seen in clinic for Day 1 of Cycle 2 of Maintenance with lumbar puncture, vincristine, and 5-day pulse of steroids.  At the time, his ANC was 2010 and his oral chemotherapy was continued at 100% dosing.  Given his history of previously drop in counts, he was scheduled to come back for repeat labs on 8/29/2023 at which point his WBC count was 1.4 and his ANC remained greater than 500 at 1140.  Again, his therapy was continued with imatinib 550 mg by mouth in the morning as well as 100% dosing of methotrexate and 100% dosing of 6-mercaptopurine.  When Tomas Roy returned to clinic on 9/11/2023 for his Maintenance, Cycle 2, Day 29 therapy he was noted to have had another drop in his WBC to 600 and his ANC accordingly was also decreased at 410.  He did receive vincristine in accordance with protocol as well as starting a 5-day pulse of prednisone per protocol.  Given however that his ANC had dropped below 500 his oral  methotrexate and oral 6-MP were again held.  He was instructed return to clinic 1 week later for lab evaluations.  On 9/19/2023 he returned to clinic and WBC had improved slightly to 0.8 however ANC remained low at 260 with an absolute monocyte count of 220.  Given that counts not recovered his oral chemotherapy remained held for an additional week.  On 9/26/2023 at 14 days after initially holding chemotherapy, he was seen back in clinic at which time WBC improved again to 1.1 however ANC did not quite recover and remained at 490 with an absolute monocyte count of 330.  Given that 2 weeks had elapsed with myelosuppression, imatinib was held in addition to oral 6-MP and methotrexate. Tomas Roy was instructed to return to clinic on 9/29/2023 for repeat counts.  On 9/29/2023 WBC had improved to 2.4 and ANC had finally recovered with 1530 and an absolute monocyte count of 510.  Given a recovery with ANC greater than 750 and platelets greater than 75, oral chemotherapy was restarted at 50% of initial dosing and imatinib was restarted at 100% of initial dosing.  On 10/9/2023, Tomas Roy returned to clinic for his Day 57 of Cycle 2 visit.  At the time of his visit, his ANC had recovered after holding chemotherapy and then restarting at 50% dosing 11 days prior.  He did however in clinic spiked a temperature 102.5 °F.  For this reason despite his ANC being improved, no dose adjustments were made in his chemotherapy.  He continued with 50% dosing with 100% adherence of his 6-MP and methotrexate as well as his imatinib at 550 mg PO QHS.   Tomas Roy was then seen in clinic again on 11/6/2023 for his Day 1 of Cycle 3 of Maintenance therapy.  At the time, his imatinib dose was adjusted back up to 600 mg daily taken in the morning.  Additionally, his methotrexate was adjusted back up to 75% of expected dosing and his mercaptopurine was increased to 75% of expected dosing as well.  He will return to clinic on  12/4/2023 for his Day 29 of Cycle 3 therapy.  At the time, his ANC was exactly 1500 and therefore no dose adjustments were made and he continued at 75% dosing for oral chemotherapy as well as the imatinib dose of 600 mg daily.  On 1/2/2024 he was seen for his Day 57 evaluations and his ANC had dropped to 1450 again not prompting any change in oral chemotherapy dosing.  Tomas Roy completed his Cycle 4 chemotherapy without any adverse events or complications.  He did not have any changes in medications either.  Most recently,  Tomas Roy was seen in clinic on 4/22/2023 for his Day 1 of Cycle 5 chemotherapy.  At the time, a lumbar puncture was performed and IT chemotherapy was provided.  He tolerated the procedure and the therapy well without any sequelae.  His ANC at the time was > 1500 however the dose adjustment was made as this was the first ANC greater than 1500 and Tomas Roy had a history of precipitous drops in his counts with increases in his oral chemotherapy.  On 5/20/2024, Tomas Roy presented to clinic for his Cycle 5, Day 29 therapy and evaluations. At that time, he was started on 5 day pulse of prednisone and his oral methotrexate dose was increased to 13 tablets PO weekly (90.78% of expected dosing). His port was removed on 5/20/2024 by Dr. Moise. He completed therapy on 5/30/2024.  Since his completion of therapy and poor removal, he has been seen in follow-up and was most recently seen on 1/15/2025 at which point there was no evidence of relapse or recurrence both clinically or in his labs.  Interval history however is remarkable for upper respiratory symptoms approximately 1-1/2 weeks ago.  At the time, he reported having some nausea and vomiting as well as an upset stomach and sore throat.  The symptoms were thought most likely to be viral and in fact improved consistent with viral illness.  On about 2/24/2025 however symptoms returned and were more flulike in nature with  "aches and pains and low-grade temperatures.  On the evening of 2/26/2025, JULY called Pediatric Hematology/Oncology to report that he \"feels like I am relapsing\". Tomas Roy was brought in to clinic today for evaluations.  In clinic, CBC, CMP, respiratory viral panel and EBV studies were obtained.  Respiratory viral studies were negative.  CBC however demonstrated a white blood cell count of 1, hemoglobin 10.6, platelets of 53 without any circulating blasts.  His CMP for the most part was normal with mild hyponatremia.  Uric acid was 7.2 and an LDH of 244.  Given these lab values as well as a temperature of 100.6 while in clinic also in the context of an acute infection of his right fourth phalanx, decision was made to bring JULY back to the hospital for admission for fever and neutropenia as well as workup of presumed relapsed disease.     On presentation, JULY is doing surprisingly well.  Although his energy overall is down, he is not overly fatigued.  He does not report any significant headaches, changes in vision or neurologic status changes.  He does complain of some mild back pain.  He has complained of right arm pain which is currently not a problem.  Has had mild nausea however it has been exceptionally mild and no vomiting in the past week.  Eating and drinking okay.  No complaints of any abdominal discomfort or pain.  Stooling and voiding normally.  No complaints of any easy bruising or bleeding at this point.  Does have newly developed rash on right forearm (dorsal aspect) as well as paronychia of the fourth distal phalanx which is painful.  Not currently taking any medications.  No other concerns or complaints at this time.     HOSPITAL COURSE:       Tomas Roy was admitted directly from home following a clinic visit at which time he was noted to be pancytopenic.  Upon admission, he was treated as febrile neutropenia.  Blood cultures were obtained peripherally and cefepime was started every 8 " hours.  There were no initial complications of hospitalization.   Tomsa Roy continued to have fevers but responded well to Tylenol.  He did complain of back pain initially on presentation which was treated with oxycodone.  In addition to cefepime, his paronychia was treated with warm soaks and topical antibiotics.  2/28/2025, Tomas Roy had a double-lumen central venous line placed at bedside.  While the line was being placed, a lumbar puncture was performed and bone marrow evaluation was performed as well.  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   Tomas Roy was started on high-dose steroids and his case was presented to transplant colleagues at [facility].  Ultimately with good discussion, it was decided to begin a 3 Drug Reinduction. Tomas Roy received his Day 1 during hospitalization there was never a finding of peripheral blasts.  Lumbar puncture was clear and did not demonstrate any evidence of malignancy.  Today, is Day 4 and Tomas Roy  is scheduled for PEG Asparaginase in the outpatient infusion center.    The patient was discharged home in stable condition on 3/8/2025.    HOME MEDICATIONS:    No current facility-administered medications on file prior to encounter.     Current Outpatient Medications on File Prior to Encounter   Medication Sig Dispense Refill    mupirocin calcium (BACTROBAN) 2 % Cream Apply 1 Application topically 2 times a day. 15 g 0       DIET:  Regular diet, age appropriate.      ACTIVITY:  Regular activity tolerated.      MEDICAL CONDITION:  Stable.    DISCHARGE INSTRUCTIONS:  1) Patient/family instructed to call for fever > 100.4 °F  2) Patient/family instructed to call for concerns of worsening clinical condition  3) Patient/family instructed to call for intractable nausea and vomiting  4) Patient/family instructed to call for any bruising or bleeding  5) Patient/family instructed to  call with any other concerns    Patient their family provided on call provider phone number [phone]    Disposition: Return to clinic on Day 8 for vincristine and evaluations    Pepe Faye MD  Pediatric Hematology / Oncology  [facility]   Direct Ph. [phone] / Cell. [phone]  [email or web address]    35 minutes were spent this morning discharging patient.  Of that time, the majority was spent in counseling and coordinating of care.

## 2025-03-09 NOTE — PROGRESS NOTES
Mr Jean-Baptiste is here today for day 4 Pegasparagase infusion for B-ALL, [place] + - relapsed.   Discharged this morning from inpatient care.   DR Faye came to visit at chairside.    He is in good spirits. No new concerns reported.     CVL to his left upper chest. Both lumens flushed well with good blood return. Labs recently drawn are within parameters for treatment today.    Benadryl given IVP, tolerated well. Makes him drowsy.   Pegasparagase administered per MAR and was tolerated well. 1 hour observation, no adverse reaction.     VS remained stable through out infusion.   CVL flushed and caps to both lumens.

## 2025-03-09 NOTE — PROGRESS NOTES
"Pediatric Hematology/Oncology  Daily Progress Note      Patient Name:  Tomas Jean-Baptiste  : 2000  MRN: [id number]    Location of Service:  [facility]  Date of Service: 3/8/2025  Time: 10:00 AM    Hospital Day: 8    Protocol / Treatment Plan:  Relapsed Leukemia, Individualized 3-Drug Re-Induction, Day 3    SUBJECTIVE:     No acute events overnight.  Afebrile with Tmax 98.9F.  No complaints of any new signs or symptoms of acute illness.  Yesterday morning was feeling ill, but no longer.  Good energy and activity despite blood counts.  Tomas Roy denies any headaches, changes in vision or neurologic changes.  Tomas Roy reports no nausea or vomiting.  No complaints of any diarrhea or constipation.  No other concerns or complaints.    Review of Systems:     Constitutional: Afebrile, today feels depleted, no longer with energy.  Good appetite and oral intake.  HENT: Negative for auditory changes or ear pain, no nosebleeds, no congestion, no rhinorrhea, no sore throat.  No mouth sores.  Eyes: Negative for visual changes.  Respiratory: No longer having shortness of breath.  Cardiovascular: Negative.  Gastrointestinal: Negative for nausea, vomiting, abdominal pain, diarrhea, constipation.  Genitourinary: Negative.  Musculoskeletal: Negative for arm pain or leg pain.    Skin: Negative for rash or skin infection.  Neurological: Negative for numbness, tingling, sensory changes, weakness or headaches.    Endo/Heme/Allergies: No bruising/bleeding easily.    Psychiatric/Behavioral: Stable mood.     OBJECTIVE:     Max Temp: Temp (24hrs), Av.6 °C (97.8 °F), Min:36.3 °C (97.3 °F), Max:37.2 °C (98.9 °F)    Vitals: /67   Pulse 92   Temp 36.3 °C (97.3 °F) (Oral)   Resp 16   Ht 1.651 m (5' 5\")   Wt 75.7 kg (166 lb 14.2 oz)   SpO2 94%   BMI 27.77 kg/m²     Labs:   Latest Reference Range & Units 25 00:00 25 09:00   WBC 4.8 - 10.8 K/uL 0.8 (LL)    RBC 4.70 - " 6.10 M/uL 2.45 (L)    Hemoglobin 14.0 - 18.0 g/dL 6.8 (L)    Hematocrit 42.0 - 52.0 % 19.0 (L)    MCV 81.4 - 97.8 fL 77.6 (L)    MCH 27.0 - 33.0 pg 27.8    MCHC 32.3 - 36.5 g/dL 35.8    RDW 35.9 - 50.0 fL 31.4 (L)    Platelet Count 164 - 446 K/uL 26 (L)    MPV 9.0 - 12.9 fL 10.2    Neutrophils-Polys 44.00 - 72.00 % 16.50 (L)    Neutrophils (Absolute) 1.82 - 7.42 K/uL 0.13 (LL)    Lymphocytes 22.00 - 41.00 % 80.90 (H)    Lymphs (Absolute) 1.00 - 4.80 K/uL 0.65 (L)    Monocytes 0.00 - 13.40 % 2.60    Monos (Absolute) 0.00 - 0.85 K/uL 0.02    Eosinophils 0.00 - 6.90 % 0.00    Eos (Absolute) 0.00 - 0.51 K/uL 0.00    Basophils 0.00 - 1.80 % 0.00    Baso (Absolute) 0.00 - 0.12 K/uL 0.00    Plt Estimation  Decreased    Imm. Plt Fraction 0.6 - 13.1 % 1.5    RBC Morphology  Present    Anisocytosis  2+ !    Microcytosis  2+ !    Smudge Cells  Few    Manual Diff Status  PERFORMED    Comment  See Comment    Sodium 135 - 145 mmol/L 138    Potassium 3.6 - 5.5 mmol/L 3.9    Chloride 96 - 112 mmol/L 106    Co2 20 - 33 mmol/L 24    Anion Gap 7.0 - 16.0  8.0    Glucose 65 - 99 mg/dL 160 (H)    Bun 8 - 22 mg/dL 15    Creatinine 0.50 - 1.40 mg/dL 0.65    GFR (CKD-EPI) >60 mL/min/1.73 m 2 135    Calcium 8.5 - 10.5 mg/dL 8.5    Correct Calcium 8.5 - 10.5 mg/dL 8.8    AST(SGOT) 12 - 45 U/L 13    ALT(SGPT) 2 - 50 U/L 19    Alkaline Phosphatase 30 - 99 U/L 73    Total Bilirubin 0.1 - 1.5 mg/dL 0.3    Albumin 3.2 - 4.9 g/dL 3.6    Total Protein 6.0 - 8.2 g/dL 5.8 (L)    Globulin 1.9 - 3.5 g/dL 2.2    A-G Ratio g/dL 1.6    Phosphorus 2.5 - 4.5 mg/dL  2.8   Uric Acid 2.5 - 8.3 mg/dL  1.5 (L)   (LL): Data is critically low  (L): Data is abnormally low  (H): Data is abnormally high  !: Data is abnormal    Physical Exam:    Constitutional: Improved energy today.  Not toxic appearing.  HENT: Normocephalic and atraumatic.  No rhinorrhea. Oropharynx is clear and moist.   Eyes: Conjunctivae are normal. EOMI. Non-icteric. Pupils equal and  round.  Neck: Normal range of motion of neck, no adenopathy.    Cardiovascular: Regular rate, regular rhythm.  No murmur. DP/radial pulses 2+, cap refill < 2 sec.  Pulmonary/Chest: Effort normal. No respiratory distress. Symmetric expansion.  No crackles or wheezes.  Abdomen: Soft. Bowel sounds are normal. No distension and no mass. There is no hepatosplenomegaly.    Genitourinary:  Deferred.  Musculoskeletal: Normal range of motion of lower and upper extremities bilaterally.   Neurological: Alert and oriented to person and place. Exhibits normal muscle tone bilaterally in upper and lower extremities. Gait not assessed this morning. Coordination normal.    Skin: Well healed distal 4th phalanx.  No longer with significant rash on right forearm.  CVL surgical sites C/D/I  Psychiatric: Mood is stable.      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, received cranial radiation 6/5/2023 to 6/16/2023              - Testicular disease negative at diagnosis              - Several complications throughout therapy to include severe septic shock 12/27/2022 while in Delayed Intensification              - Completed therapy 5/30/2024              - Last seen in clinic on 1/15/2025 without any evidence of relapse (clinical/laboratory)              - Reported viral URI symptoms approximately 2.5 weeks prior to presentation               - Interval resolution of viral URI symptoms followed by very acute worsening of flulike symptoms as well as aches and pains              - Seen in clinic 2/27/2025:    -  WBC 1000 cells/uL, Hgb 10.6 g/dL, platelets 53,000 cells/uL              - ANC 10, , absolute monocyte count 20                 - Precipitous drop of counts over the past month with context of low-grade temperatures and bone pain most consistent with relapsed leukemia                 - Admission for Fever and Neutropenia 2/27/2025                 - Double-lumen Broviac line placement, diagnostic bone marrow evaluation to include aspirate and biopsy, flow cytometry and FISH to confirm relapse at bedside 2/28/2025  - Testicular negative at relapse  - CSF negative consistent with CNS1  - Confirmed 13% blasts in hemodilute BMA specimen by flow  - Confirmed BCR-ABL1 fusion in 17% cells by FISH              - Have already reached out to transplant colleagues to begin planning allogeneic transplant.                 - After discussing multiple options, will propose to patient a 3-Drug Re-Induction (VCR/PRED/PEG-ASP) + Imatinib followed by blinatumomab     Individualized Salvage Therapy, 3-Drug Re-Induction, Day 2:   ** Methotrexate 15 mg IT x 1 on Days 1 (COMPLETE, see separate procedure note), 8, and 29 (will discuss with transplant team their thought on number of LP during salvage therapy as disease is isolated marrow relapse)   ** Vincristine 2 mg IV x 1 on Days 1 (COMPLETE), 8, 15 and 22   ** Prednisone 30 mg/m2/dose = 60 mg PO BID x 28 days    ** PEG-Aspargase 2000 IU/m2 x 1 on Day 4 in Hospitals in [place] (TOMORROW)    ** Imatinib 400 mg PO QAM and 250 mg PO QHS   (As previously, will not cut pill in hospital and will administer 600 mg PO QAM instead)     2) Fever and Neutropenia:              - ANC 10 2/27/2025              - Fever to 101.6 °F 2/27/2025              - Paronychia of the fourth distal phalanx of the right hand as possible source of infection - greatly improved since starting antibiotics              - Admission to [facility] 2/27/2025              - Given clinical stability, no fluid bolus administered              -  Blood culture obtained peripherally (no central access) NGTD              - Ordered HSV studies on blood given HSV 1 positive titers previously as well as fourth distal phalanx paronychia NOT DETECTED              - Cefepime 2 g IV Q8 hours started empirically              - Tylenol 650 mg PO Q6 hours for fever              - Supportive care of symptoms     3) Paronychia: (IMPROVED)              - Paronychia of the right fourth distal phalanx with intense erythema              - 3-4 x daily warm water bath soaks              - Mupirocin ointment topically              - Cefepime as above              - Bloodstream without evidence of HSV infection     - Will go home on antibiotics (Keflex)     4) 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:              - Continue hydration with normal saline               - Allopurinol 200 mg PO TID              - Daily TLS labs      - K+ 3.9, Ca++ 8.5, Uric Acid 1.5, phosphorus 2.8 today    5) Disease Related Pancytopenia:              -  cells/uL, Hgb 6.8 g/dL, platelets 26,000 cells/uL              - , , absolute monocyte count 20  BLASTS: 0              - Transfuse irradiated PRBC for Hgb<7 g/dL or symptomatic              - Transfuse irradiated platelets for platelet count of <10,000 cells/uL or symptomatic                 - Transfused irradiated PRBC today     - CBC daily through discharge     6) Vascular Access:              - Double-lumen CVL placed 2/28/2025     7) Psychosocial:   - Dr. Ortega seeing     8) Social:              - Referred  to Social Work and financial navigator     Disposition: Inpatient for fever and neutropenia, workup of presumed relapsed disease, referral to transplant - initiation of re-induction therapy.    Plan for discharge tomorrow AM to [facility] for PEG-Aspargase.    Pepe Faye MD  Pediatric Hematology / Oncology  [facility]  Cell.  [phone]  Office. [phone]

## 2025-03-09 NOTE — PROGRESS NOTES
"Pharmacy Chemotherapy Verification Note:    Dx: B-ALL, PH+ relapsed        Protocol: Individualized 3 Drug Reinduction + Imatininb     IT methotrexate 15 mg for age >/=9 yrs on Days 1, 8, and 29  Vincristine 1.5 mg/m2 (max 2 mg) IV over 5-10 mins on Days 1, 8, 15, and 22  Prednisone 30 mg/m2 PO BID on Days 1-28  Pegaspargase 2000 units/m2 IV over 1-2 hrs on Day 4  Imatinib 340 mg/m2 PO daily (split BID if dose >600 mg)    Dosing references[place] [facility] [place] [id number] and 1631 (imantinib)  Plan to transition to Blincyto consolidation after induction    Allergies:  Amoxicillin     /74   Pulse 87   Temp 36.2 °C (97.2 °F) (Temporal)   Resp 18   Ht 1.66 m (5' 5.35\")   Wt 77.9 kg (171 lb 11.8 oz)   SpO2 99%   BMI 28.27 kg/m²  Body surface area is 1.9 meters squared.    Labs 3/9/25:  BG = 159     Drug Order   (Drug name, dose, route, IV Fluid & volume, frequency, number of doses) Cycle: Induction Day 4      Previous treatment: D1 3/6/25; completed initial treatment 5/30/24     Medication = IT methotrexate  Base Dose = 15 mg fixed dose  Calc Dose: n/a  Final Dose = -- mg  Route = intraTHECAL  Fluid & Volume = NS 6 mL  Admin Duration = n/a   Days 1, 8, and 29       <10% difference, okay to treat with final dose   Medication = vincristine  Base Dose = 1.5 mg/m2 (max 2 mg)  Calc Dose: Base Dose x 1.86 m2 = 2.79 mg  Final Dose = -- mg  Route = IV  Fluid & Volume = NS 25 mL  Admin Duration = Over 5-10 mins   Days 1, 8, 15, and 22       okay to treat with max final dose   Medication = pegaspargase (Oncaspar)  Base Dose = 2000 units/m2  Calc Dose:Base Dose x 1.9 m2 = 3800 units  Final Dose = 3720 units  Route = IV  Fluid & Volume =  mL  Admin Duration = Over 2 hrs   Day 4       <10% difference, okay to treat with final dose   Oral chemo: imatinib 340 mg/m2 x 1.86m2 = 632.4 mg                         <10% difference, okay to treat with final dose = 600 mg PO daily    By my signature below, I confirm this process " was performed independently with the BSA and all final chemotherapy dosing calculations congruent. I have reviewed the above chemotherapy order and that my calculation of the final dose and BSA (when applicable) corroborate those calculations of the  pharmacist.     Willi Hall, PharmD

## 2025-03-09 NOTE — CARE PLAN
The patient is Stable - Low risk of patient condition declining or worsening    Shift Goals  Clinical Goals: Monitor labs, transfuse as necessary  Patient Goals: DC in the AM  Family Goals: POC updates    Progress made toward(s) clinical / shift goals:     Problem: Knowledge Deficit - Standard  Goal: Patient and family/care givers will demonstrate understanding of plan of care, disease process/condition, diagnostic tests and medications  Outcome: Progressing     Problem: Neutropenia Precautions  Goal: Neutropenic precautions will be implemented and maintained for patient protection  Outcome: Progressing     Problem: Fall Risk  Goal: Patient will remain free from falls  Outcome: Progressing

## 2025-03-09 NOTE — PROGRESS NOTES
Chemotherapy Verification - PRIMARY RN      Height = 1.66 m  Weight = 77.9 kg  BSA = 1.9 m2       Medication: pegaspargase  Dose: 2000IU/m2  Calculated Dose: 3800 mg                             (In mg/m2, AUC, mg/kg)     I confirm this process was performed independently with the BSA and all final chemotherapy dosing calculations congruent.  Any discrepancies of 10% or greater have been addressed with the chemotherapy pharmacist. The resolution of the discrepancy has been documented in the EPIC progress notes.

## 2025-03-09 NOTE — PROGRESS NOTES
Chemotherapy Verification - SECONDARY RN       Height = 166cm  Weight = 77.9kg  BSA = 1.9m^2       Medication: pegaspargase (Oncaspar)  Dose: 2000 Int'l units/m^2  Calculated Dose: 3800 units                                 I confirm that this process was performed independently.

## 2025-03-09 NOTE — PROGRESS NOTES
"Pharmacy Chemotherapy Calculations:    Dx: relapsed B-ALL, ph+     Cycle 1, Day 4  Previous treatment = Induction D1 on 3/6/25; original tx finished 5/20/24    Protocol: Individualized 3 Drug Reinduction + Imatininb  IT methotrexate 15 mg for age >/=9 yrs on Days 1, 8, and 29  Vincristine 1.5 mg/m2 (max 2 mg) IV over 5-10 mins on Days 1, 8, 15, and 22  Prednisone 30 mg/m2 PO BID on Days 1-28  Pegaspargase 2000 units/m2 IV over 1-2 hrs on Day 4  Imatinib 340 mg/m2 PO daily (split BID if dose >600 mg)  Dosing references: [facility] protocols [id number] and 1631 (imantinib)  Plan to transition to Blincyto consolidation after re-induction    Allergies:  Amoxicillin    /74   Pulse 87   Temp 36.2 °C (97.2 °F) (Temporal)   Resp 18   Ht 1.66 m (5' 5.35\")   Wt 77.9 kg (171 lb 11.8 oz)   SpO2 99%   BMI 28.27 kg/m²  Body surface area is 1.9 meters squared.    Labs from 3/9/25 reviewed - all within treatment parameters.      Pegaspargase (Oncaspar) 2000 units/m² x 1.9 m² = 3800 units   <10% difference, okay to treat with final dose = 3720 units IV      Judy Bryant, PharmD, BCOP    "

## 2025-03-10 ENCOUNTER — HOSPITAL ENCOUNTER (OUTPATIENT)
Facility: MEDICAL CENTER | Age: 25
End: 2025-03-10
Attending: PEDIATRICS | Admitting: PEDIATRICS
Payer: COMMERCIAL

## 2025-03-10 ENCOUNTER — APPOINTMENT (OUTPATIENT)
Dept: ONCOLOGY | Facility: MEDICAL CENTER | Age: 25
End: 2025-03-10
Attending: PEDIATRICS
Payer: COMMERCIAL

## 2025-03-10 NOTE — PROGRESS NOTES
"Pediatric Hematology/Oncology  Daily Progress Note      Patient Name:  Tomas Jean-Baptiste  : 2000  MRN: [id number]    Location of Service:  [facility]  Date of Service: 3/9/2025  Time: 8:00 AM    Hospital Day: 9    Protocol / Treatment Plan:  Relapsed Leukemia, Individualized 3-Drug Re-Induction, Day 4    SUBJECTIVE:     No acute events overnight. Continues to do better without any additional shortness of breath or difficulty breathing.  Tomas Roy denies any headaches or neurologic changes.  Tomas Roy reports that his energy and activity are improved.  No complaints of any skin changes or rashes.  Stitches removed from CVL site.  No complaints of nausea, vomiting, diarrhea or constipation.  No abdominal pain.  Tolerating imatinib.  No complaints of any aches or pains.  No other concerns or complaints at this time.    Review of Systems:     Constitutional: Afebrile, improved energy.  Good appetite and oral intake.  HENT: Negative for auditory changes or ear pain, no nosebleeds, no congestion, no rhinorrhea, no sore throat.  No mouth sores.  Eyes: Negative for visual changes.  Respiratory: No longer having shortness of breath.  Cardiovascular: Negative.  Gastrointestinal: Negative for nausea, vomiting, abdominal pain, diarrhea, constipation.  Genitourinary: Negative.  Musculoskeletal: Negative for arm pain or leg pain.    Skin: Negative for rash or skin infection.  Neurological: Negative for numbness, tingling, sensory changes, weakness or headaches.    Endo/Heme/Allergies: No bruising/bleeding easily.    Psychiatric/Behavioral: Stable mood.     OBJECTIVE:     Max Temp: Temp (24hrs), Av.5 °C (97.7 °F), Min:36.2 °C (97.2 °F), Max:36.7 °C (98 °F)    Vitals: /62   Pulse 84   Temp 36.6 °C (97.8 °F) (Oral)   Resp 16   Ht 1.651 m (5' 5\")   Wt 75.7 kg (166 lb 14.2 oz)   SpO2 98%   BMI 27.77 kg/m²     Labs:     Latest Reference Range & Units 25 00:53 "   WBC 4.8 - 10.8 K/uL 1.0 (LL)   RBC 4.70 - 6.10 M/uL 2.91 (L)   Hemoglobin 14.0 - 18.0 g/dL 8.1 (L)   Hematocrit 42.0 - 52.0 % 22.6 (L)   MCV 81.4 - 97.8 fL 77.7 (L)   MCH 27.0 - 33.0 pg 27.8   MCHC 32.3 - 36.5 g/dL 35.8   RDW 35.9 - 50.0 fL 32.4 (L)   Platelet Count 164 - 446 K/uL 17 (LL)   MPV 9.0 - 12.9 fL 10.8   Neutrophils-Polys 44.00 - 72.00 % 23.20 (L)   Neutrophils (Absolute) 1.82 - 7.42 K/uL 0.23 (LL)   Lymphocytes 22.00 - 41.00 % 75.90 (H)   Lymphs (Absolute) 1.00 - 4.80 K/uL 0.76 (L)   Monocytes 0.00 - 13.40 % 0.90   Monos (Absolute) 0.00 - 0.85 K/uL 0.01   Eosinophils 0.00 - 6.90 % 0.00   Eos (Absolute) 0.00 - 0.51 K/uL 0.00   Basophils 0.00 - 1.80 % 0.00   Baso (Absolute) 0.00 - 0.12 K/uL 0.00   Plt Estimation  SEE BELOW   Imm. Plt Fraction 0.6 - 13.1 % 2.1   RBC Morphology  Present   Anisocytosis  1+   Microcytosis  2+ !   Manual Diff Status  PERFORMED   Sodium 135 - 145 mmol/L 139   Potassium 3.6 - 5.5 mmol/L 4.1   Chloride 96 - 112 mmol/L 106   Co2 20 - 33 mmol/L 22   Anion Gap 7.0 - 16.0  11.0   Glucose 65 - 99 mg/dL 159 (H)   Bun 8 - 22 mg/dL 16   Creatinine 0.50 - 1.40 mg/dL 0.67   GFR (CKD-EPI) >60 mL/min/1.73 m 2 134   Calcium 8.5 - 10.5 mg/dL 8.1 (L)   Correct Calcium 8.5 - 10.5 mg/dL 8.3 (L)   AST(SGOT) 12 - 45 U/L 16   ALT(SGPT) 2 - 50 U/L 34   Alkaline Phosphatase 30 - 99 U/L 69   Total Bilirubin 0.1 - 1.5 mg/dL 0.3   Albumin 3.2 - 4.9 g/dL 3.7   Total Protein 6.0 - 8.2 g/dL 5.7 (L)   Globulin 1.9 - 3.5 g/dL 2.0   A-G Ratio g/dL 1.9   (LL): Data is critically low  (L): Data is abnormally low  (H): Data is abnormally high  !: Data is abnormal    Physical Exam:    Constitutional: Improved energy today.  Not toxic appearing.  HENT: Normocephalic and atraumatic.  No rhinorrhea. Oropharynx is clear and moist.   Eyes: Conjunctivae are normal. EOMI. Non-icteric. Pupils equal and round.  Neck: Normal range of motion of neck, no adenopathy.    Cardiovascular: Regular rate, regular rhythm.  No  murmur. DP/radial pulses 2+, cap refill < 2 sec.  Pulmonary/Chest: Effort normal. No respiratory distress. Symmetric expansion.  No crackles or wheezes.  Abdomen: Soft. Bowel sounds are normal. No distension and no mass. There is no hepatosplenomegaly.    Genitourinary:  Deferred.  Musculoskeletal: Normal range of motion of lower and upper extremities bilaterally.   Neurological: Alert and oriented to person and place. Exhibits normal muscle tone bilaterally in upper and lower extremities. Gait not assessed this morning. Coordination normal.    Skin: Well healed distal 4th phalanx.  No longer with significant rash on right forearm.  CVL surgical sites C/D/I  Psychiatric: Mood is stable.      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, received cranial radiation 6/5/2023 to 6/16/2023              - Testicular disease negative at diagnosis              - Several complications throughout therapy to include severe septic shock 12/27/2022 while in Delayed Intensification              - Completed therapy 5/30/2024              - Last seen in clinic on 1/15/2025 without any evidence of relapse (clinical/laboratory)              - Reported viral URI symptoms approximately 2.5 weeks prior to presentation               - Interval resolution of viral URI symptoms followed by very acute worsening of flulike symptoms as well as aches and pains              - Seen in clinic 2/27/2025:    - WBC 1000 cells/uL, Hgb 10.6 g/dL, platelets 53,000 cells/uL              - ANC 10, , absolute monocyte  count 20                 - Precipitous drop of counts over the past month with context of low-grade temperatures and bone pain most consistent with relapsed leukemia                 - Admission for Fever and Neutropenia 2/27/2025                 - Double-lumen Broviac line placement, diagnostic bone marrow evaluation to include aspirate and biopsy, flow cytometry and FISH to confirm relapse at bedside 2/28/2025  - Testicular negative at relapse  - CSF negative consistent with CNS1  - Confirmed 13% blasts in hemodilute BMA specimen by flow  - Confirmed BCR-ABL1 fusion in 17% cells by FISH              - Have already reached out to transplant colleagues to begin planning allogeneic transplant.                 - After discussing multiple options, will propose to patient a 3-Drug Re-Induction (VCR/PRED/PEG-ASP) + Imatinib followed by blinatumomab     Individualized Salvage Therapy, 3-Drug Re-Induction, Day 2:   ** Methotrexate 15 mg IT x 1 on Days 1 (COMPLETE, see separate procedure note), 8, and 29 (will discuss with transplant team their thought on number of LP during salvage therapy as disease is isolated marrow relapse)   ** Vincristine 2 mg IV x 1 on Days 1 (COMPLETE), 8, 15 and 22   ** Prednisone 30 mg/m2/dose = 60 mg PO BID x 28 days    ** PEG-Aspargase 2000 IU/m2 x 1 on Day 4 in \[facility]\"" (TODAY)    ** Imatinib 400 mg PO QAM and 250 mg PO QHS   (As previously, will not cut pill in hospital and will administer 600 mg PO QAM instead)     2) Fever and Neutropenia:              - ANC 10 2/27/2025              - Fever to 101.6 °F 2/27/2025              - Paronychia of the fourth distal phalanx of the right hand as possible source of infection - greatly improved since starting antibiotics              - Admission to [facility] 2/27/2025              - Given clinical stability, no fluid bolus administered              - Blood culture obtained peripherally (no central access) NGTD              - Ordered HSV studies on blood given  HSV 1 positive titers previously as well as fourth distal phalanx paronychia NOT DETECTED              - Cefepime 2 g IV Q8 hours started empirically              - Tylenol 650 mg PO Q6 hours for fever              - Supportive care of symptoms     3) Paronychia: (IMPROVED)              - Paronychia of the right fourth distal phalanx with intense erythema              - 3-4 x daily warm water bath soaks              - Mupirocin ointment topically              - Cefepime as above              - Bloodstream without evidence of HSV infection     - Will go home on antibiotics (Keflex)     4) 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 (RESOLVED):     5) Disease Related Pancytopenia:              - WBC 1000 cells/uL, Hgb 8.1 g/dL, platelets 17,000 cells/uL              - , , absolute monocyte count 10  BLASTS: 0              - Transfuse irradiated PRBC for Hgb<7 g/dL or symptomatic              - Transfuse irradiated platelets for platelet count of <10,000 cells/uL or symptomatic                 - No transfusion today however will need transfusion later in the week especially if giving Date 8 lumbar puncture     - CBC daily through discharge     6) Vascular Access:              - Double-lumen CVL placed 2/28/2025     7) Psychosocial:   - Dr. Ortega seeing     8) Social:              - Referred  to Social Work and financial navigator     Disposition: Discharge to [facility] this AM    Pepe Faye MD  Pediatric Hematology / Oncology  [facility] [place]  Cell.  [phone]  Office. [phone]

## 2025-03-11 ENCOUNTER — PHARMACY VISIT (OUTPATIENT)
Dept: PHARMACY | Facility: MEDICAL CENTER | Age: 25
End: 2025-03-11
Payer: COMMERCIAL

## 2025-03-11 DIAGNOSIS — C91.Z0 B LYMPHOBLASTIC LEUKEMIA WITH T(9;22)(Q34;Q11.2);BCR-ABL1 (HCC): ICD-10-CM

## 2025-03-11 LAB
CHROM ANALY INTERPHASE MAR FISH-IMP: NORMAL
PATHOLOGY STUDY: NORMAL

## 2025-03-11 PROCEDURE — RXMED WILLOW AMBULATORY MEDICATION CHARGE: Performed by: PEDIATRICS

## 2025-03-11 RX ORDER — IMATINIB MESYLATE 400 MG/1
TABLET, FILM COATED ORAL
Qty: 21 TABLET | Refills: 0 | Status: SHIPPED | OUTPATIENT
Start: 2025-03-11 | End: 2025-03-17

## 2025-03-11 RX ORDER — PREDNISONE 20 MG/1
60 TABLET ORAL 2 TIMES DAILY
Qty: 150 TABLET | Refills: 0 | Status: ON HOLD | OUTPATIENT
Start: 2025-03-11 | End: 2025-04-05

## 2025-03-11 RX ORDER — IMATINIB MESYLATE 400 MG/1
600 TABLET, FILM COATED ORAL EVERY MORNING
Qty: 45 TABLET | Refills: 0 | Status: SHIPPED | OUTPATIENT
Start: 2025-03-11 | End: 2025-03-17

## 2025-03-11 NOTE — PROGRESS NOTES
See Admission Note.  No professional outpatient professional billing for DOS 2/27/2025 as patient was admitted directly after labs resulted.

## 2025-03-13 ENCOUNTER — PRE-ADMISSION TESTING (OUTPATIENT)
Dept: ADMISSIONS | Facility: MEDICAL CENTER | Age: 25
End: 2025-03-13
Payer: COMMERCIAL

## 2025-03-13 NOTE — PREADMIT AVS NOTE
Current Medications   Medication Instructions    imatinib (GLEEVEC) 400 MG tablet Follow instructions from surgeon or specialist.         predniSONE (DELTASONE) 20 MG Tab Continue taking as prescribed.    famotidine (PEPCID) 20 MG Tab Continue taking medication as prescribed, including morning of procedure     sulfamethoxazole-trimethoprim (BACTRIM DS) 800-160 MG tablet Follow instructions from surgeon or specialist.               
N/A

## 2025-03-13 NOTE — PROGRESS NOTES
"Pharmacy Chemotherapy Calculations:    Dx: relapsed B-ALL (CNS3)     Cycle 1, Day 8  Previous treatment = C1D4 3/9/25; Maintenance [id number] C5D29 (5/20/24)    Protocol: Individualized 3 Drug Reinduction + Imatininb  *Dosing regimen*     IT methotrexate 15 mg for age >/=9 yrs on Days 1, 8, and 29  -per Dr Faye omit IT MTX for Day 8 (CNS negative)  Vincristine 1.5 mg/m2 (max 2 mg) IV over 5-10 mins on Days 1, 8, 15, and 22  Prednisone 30 mg/m2 PO BID on Days 1-28  Pegaspargase 2000 units/m2 IV over 1-2 hrs on Day 4  Imatinib 340 mg/m2 PO daily (split BID if dose >600 mg)  Dosing references: [facility] protocols [id number] and 1631 (imantinib)  Plan to transition to Blincyto consolidation after induction      Allergies:  Amoxicillin    /67   Pulse 95   Temp 37.4 °C (99.3 °F) (Temporal)   Resp 18   Ht 1.65 m (5' 4.96\")   Wt 76.7 kg (169 lb 1.5 oz)   SpO2 96%   BMI 28.17 kg/m²  Body surface area is 1.87 meters squared.      Labs 3/14/25  Plt=3*  Direct bilirubin: 0.4  *Okay to proceed with treatment today per Dr Faye      Vincristine 1.5 mg/m² x 1.87 m² = > 2 mg   <10% difference, okay to treat with final dose = 2 mg IV       Luisana Carrero, PharmD    "

## 2025-03-14 ENCOUNTER — OUTPATIENT INFUSION SERVICES (OUTPATIENT)
Dept: ONCOLOGY | Facility: MEDICAL CENTER | Age: 25
End: 2025-03-14
Attending: PEDIATRICS
Payer: COMMERCIAL

## 2025-03-14 VITALS
HEART RATE: 95 BPM | SYSTOLIC BLOOD PRESSURE: 129 MMHG | OXYGEN SATURATION: 96 % | RESPIRATION RATE: 18 BRPM | HEIGHT: 65 IN | BODY MASS INDEX: 28.17 KG/M2 | DIASTOLIC BLOOD PRESSURE: 67 MMHG | TEMPERATURE: 99.3 F | WEIGHT: 169.09 LBS

## 2025-03-14 DIAGNOSIS — C91.Z0 B LYMPHOBLASTIC LEUKEMIA WITH T(9;22)(Q34;Q11.2);BCR-ABL1 (HCC): ICD-10-CM

## 2025-03-14 DIAGNOSIS — R11.2 CHEMOTHERAPY INDUCED NAUSEA AND VOMITING: ICD-10-CM

## 2025-03-14 DIAGNOSIS — T45.1X5A CHEMOTHERAPY INDUCED NAUSEA AND VOMITING: ICD-10-CM

## 2025-03-14 LAB
ANISOCYTOSIS BLD QL SMEAR: ABNORMAL
BASOPHILS # BLD AUTO: 0 % (ref 0–1.8)
BASOPHILS # BLD: 0 K/UL (ref 0–0.12)
BILIRUB CONJ SERPL-MCNC: 0.4 MG/DL (ref 0.1–0.5)
EOSINOPHIL # BLD AUTO: 0 K/UL (ref 0–0.51)
EOSINOPHIL NFR BLD: 0 % (ref 0–6.9)
ERYTHROCYTE [DISTWIDTH] IN BLOOD BY AUTOMATED COUNT: 35.1 FL (ref 35.9–50)
HCT VFR BLD AUTO: 26.1 % (ref 42–52)
HGB BLD-MCNC: 9.1 G/DL (ref 14–18)
LYMPHOCYTES # BLD AUTO: 0.8 K/UL (ref 1–4.8)
LYMPHOCYTES NFR BLD: 72.6 % (ref 22–41)
MANUAL DIFF BLD: NORMAL
MCH RBC QN AUTO: 27.6 PG (ref 27–33)
MCHC RBC AUTO-ENTMCNC: 34.9 G/DL (ref 32.3–36.5)
MCV RBC AUTO: 79.1 FL (ref 81.4–97.8)
MICROCYTES BLD QL SMEAR: ABNORMAL
MONOCYTES # BLD AUTO: 0.01 K/UL (ref 0–0.85)
MONOCYTES NFR BLD AUTO: 0.9 % (ref 0–13.4)
MORPHOLOGY BLD-IMP: NORMAL
NEUTROPHILS # BLD AUTO: 0.29 K/UL (ref 1.82–7.42)
NEUTROPHILS NFR BLD: 26.5 % (ref 44–72)
NRBC # BLD AUTO: 0 K/UL
NRBC BLD-RTO: 0 /100 WBC (ref 0–0.2)
PLATELET # BLD AUTO: 3 K/UL (ref 164–446)
PLATELET BLD QL SMEAR: NORMAL
PLATELETS.RETICULATED NFR BLD AUTO: 6.6 % (ref 0.6–13.1)
RBC # BLD AUTO: 3.3 M/UL (ref 4.7–6.1)
RBC BLD AUTO: PRESENT
WBC # BLD AUTO: 1.1 K/UL (ref 4.8–10.8)

## 2025-03-14 PROCEDURE — 700105 HCHG RX REV CODE 258: Mod: UD | Performed by: PEDIATRICS

## 2025-03-14 PROCEDURE — 96413 CHEMO IV INFUSION 1 HR: CPT

## 2025-03-14 PROCEDURE — 82248 BILIRUBIN DIRECT: CPT

## 2025-03-14 PROCEDURE — 85027 COMPLETE CBC AUTOMATED: CPT

## 2025-03-14 PROCEDURE — 85007 BL SMEAR W/DIFF WBC COUNT: CPT

## 2025-03-14 PROCEDURE — 700111 HCHG RX REV CODE 636 W/ 250 OVERRIDE (IP): Mod: JZ,UD | Performed by: PEDIATRICS

## 2025-03-14 PROCEDURE — 85055 RETICULATED PLATELET ASSAY: CPT

## 2025-03-14 RX ORDER — DIPHENHYDRAMINE HYDROCHLORIDE 50 MG/ML
25 INJECTION, SOLUTION INTRAMUSCULAR; INTRAVENOUS PRN
Status: CANCELLED | OUTPATIENT
Start: 2025-03-14

## 2025-03-14 RX ORDER — ONDANSETRON 4 MG/1
8 TABLET, ORALLY DISINTEGRATING ORAL EVERY 6 HOURS PRN
Qty: 20 TABLET | Refills: 3 | Status: SHIPPED | OUTPATIENT
Start: 2025-03-14 | End: 2025-03-21 | Stop reason: SDUPTHER

## 2025-03-14 RX ORDER — HYDROCORTISONE SODIUM SUCCINATE 100 MG/2ML
100 INJECTION INTRAMUSCULAR; INTRAVENOUS PRN
Status: CANCELLED | OUTPATIENT
Start: 2025-03-14

## 2025-03-14 RX ORDER — ACETAMINOPHEN 325 MG/1
650 TABLET ORAL PRN
Status: CANCELLED | OUTPATIENT
Start: 2025-03-14

## 2025-03-14 RX ORDER — IMATINIB MESYLATE 400 MG/1
400 TABLET, FILM COATED ORAL EVERY MORNING
Qty: 30 TABLET | Refills: 0 | Status: SHIPPED | OUTPATIENT
Start: 2025-03-14 | End: 2025-03-21

## 2025-03-14 RX ORDER — IMATINIB MESYLATE 100 MG/1
TABLET, FILM COATED ORAL
Qty: 75 TABLET | Refills: 0 | Status: SHIPPED | OUTPATIENT
Start: 2025-03-14 | End: 2025-03-17

## 2025-03-14 RX ADMIN — VINCRISTINE SULFATE 2 MG: 1 INJECTION, SOLUTION INTRAVENOUS at 17:26

## 2025-03-14 ASSESSMENT — FIBROSIS 4 INDEX: FIB4 SCORE: 3.71

## 2025-03-14 NOTE — PROGRESS NOTES
"Pharmacy Chemotherapy Verification Note:    Dx: B-ALL, PH+ relapsed        Protocol: Individualized 3 Drug Reinduction + Imatininb     IT methotrexate 15 mg for age >/=9 yrs on Days 1, and 29   3/14/25 Dr. Faye omitting Day 8, pt is CNS negative upon relapse. He is considering holding day 15 also.   Vincristine 1.5 mg/m2 (max 2 mg) IV over 5-10 mins on Days 1, 8, 15, and 22  Prednisone 30 mg/m2 PO BID on Days 1-28  Pegaspargase 2000 units/m2 IV over 1-2 hrs on Day 4  Imatinib 340 mg/m2 PO daily (split BID if dose >600 mg)    Dosing references: [facility] protocols [id number] and 1631 (imantinib)  Plan to transition to Blincyto consolidation after induction    Allergies:  Amoxicillin     /67   Pulse 95   Temp 37.4 °C (99.3 °F) (Temporal)   Resp 18   Ht 1.65 m (5' 4.96\")   Wt 76.7 kg (169 lb 1.5 oz)   SpO2 96%   BMI 28.17 kg/m²  Body surface area is 1.87 meters squared.    Labs 3/14/25:  ANC~ 290 Plt = 3 k   Hgb = 9.1     DBili = 0.4     *Dr. Faye aware of current lab results     Drug Order   (Drug name, dose, route, IV Fluid & volume, frequency, number of doses) Cycle: Induction Day 8      Previous treatment: D4 3/9/25; completed initial treatment 5/30/24     Medication = IT methotrexate  Base Dose = 15 mg fixed dose  Calc Dose: n/a  Final Dose = -- mg  Route = intraTHECAL  Fluid & Volume = NS 6 mL  Admin Duration = n/a   Days 1, 8, and 29   Dr. Faye omitting Day 8, pt is CNS negative upon relapse.       <10% difference, okay to treat with final dose   Medication = vincristine  Base Dose = 1.5 mg/m2 (max 2 mg)  Calc Dose: Base Dose x 1.87 m2 = 2.8 mg  Final Dose = 2 mg(MAX)  Route = IV  Fluid & Volume = NS 25 mL  Admin Duration = Over 5-10 mins   Days 1, 8, 15, and 22       okay to treat with max final dose   Medication = pegaspargase (Oncaspar)  Base Dose = 2000 units/m2  Calc Dose:Base Dose x 1.9 m2 = 3800 units  Final Dose = 3720 units  Route = IV  Fluid & Volume =  mL  Admin Duration = Over 2 " hrs   Day 4       <10% difference, okay to treat with final dose     By my signature below, I confirm this process was performed independently with the BSA and all final chemotherapy dosing calculations congruent. I have reviewed the above chemotherapy order and that my calculation of the final dose and BSA (when applicable) corroborate those calculations of the  pharmacist.     Marifer Torres, PharmD

## 2025-03-14 NOTE — PROGRESS NOTES
Mr Jean-Baptiste is here today for day 8 cycle 1 of Vincristine.    He reports feeling well, some fatigue.     CVC to left upper chest was flushed, both lumens flush well with good blood return.   Labs drawn. Platelets today are 3 k,  VORB Dr. Faye (present at chairside) ok to proceed with treatment today. Pt to return Sunday at 1530 for platelet transfusion.     Vincristine administered per MAR and was tolerated well. CVC flushed. JULY discharged in stable condition.     CVC dressing change done today. Insertion site appears to be free of s/s of infection.     Pt educated on thrombocytopenia precautions, what to watch for and when to go to the ED. Verbalized understanding.

## 2025-03-14 NOTE — PROGRESS NOTES
Chemotherapy Verification - PRIMARY RN      Height = 1.65 m  Weight = 76.7 kg  BSA = 1.87 m2       Medication: Vincristine  Dose: 2 mg  Calculated Dose: 2 mg                             (In mg/m2, AUC, mg/kg)       I confirm this process was performed independently with the BSA and all final chemotherapy dosing calculations congruent.  Any discrepancies of 10% or greater have been addressed with the chemotherapy pharmacist. The resolution of the discrepancy has been documented in the EPIC progress notes.

## 2025-03-15 ENCOUNTER — TELEPHONE (OUTPATIENT)
Dept: PEDIATRIC HEMATOLOGY/ONCOLOGY | Facility: MEDICAL CENTER | Age: 25
End: 2025-03-15
Payer: COMMERCIAL

## 2025-03-15 NOTE — PROGRESS NOTES
Chemotherapy Verification - SECONDARY RN       Height = 165 cm  Weight = 76.7 kg  BSA = 1.87 m2       Medication: Vincristine  Dose: 2 mg (set dose)  Calculated Dose: 2 mg                             (In mg/m2, AUC, mg/kg)       I confirm that this process was performed independently.

## 2025-03-16 ENCOUNTER — OUTPATIENT INFUSION SERVICES (OUTPATIENT)
Dept: ONCOLOGY | Facility: MEDICAL CENTER | Age: 25
End: 2025-03-16
Attending: PEDIATRICS
Payer: COMMERCIAL

## 2025-03-16 VITALS
DIASTOLIC BLOOD PRESSURE: 69 MMHG | TEMPERATURE: 98.6 F | BODY MASS INDEX: 26.63 KG/M2 | HEART RATE: 85 BPM | OXYGEN SATURATION: 96 % | WEIGHT: 159.83 LBS | HEIGHT: 65 IN | RESPIRATION RATE: 18 BRPM | SYSTOLIC BLOOD PRESSURE: 120 MMHG

## 2025-03-16 DIAGNOSIS — C91.00 PHILADELPHIA CHROMOSOME POSITIVE ACUTE LYMPHOBLASTIC LEUKEMIA (HCC): ICD-10-CM

## 2025-03-16 DIAGNOSIS — R11.0 NAUSEA: ICD-10-CM

## 2025-03-16 DIAGNOSIS — C91.Z0 B LYMPHOBLASTIC LEUKEMIA WITH T(9;22)(Q34;Q11.2);BCR-ABL1 (HCC): ICD-10-CM

## 2025-03-16 LAB
ABO GROUP BLD: NORMAL
BARCODED ABORH UBTYP: 6200
BARCODED PRD CODE UBPRD: NORMAL
BARCODED UNIT NUM UBUNT: NORMAL
BASOPHILS # BLD AUTO: 0 % (ref 0–1.8)
BASOPHILS # BLD: 0 K/UL (ref 0–0.12)
BLD GP AB SCN SERPL QL: NORMAL
COMMENT NL1176: NORMAL
COMPONENT P 8504P: NORMAL
EOSINOPHIL # BLD AUTO: 0 K/UL (ref 0–0.51)
EOSINOPHIL NFR BLD: 0 % (ref 0–6.9)
ERYTHROCYTE [DISTWIDTH] IN BLOOD BY AUTOMATED COUNT: 35.3 FL (ref 35.9–50)
HCT VFR BLD AUTO: 25.3 % (ref 42–52)
HGB BLD-MCNC: 8.8 G/DL (ref 14–18)
HYPOCHROMIA BLD QL SMEAR: ABNORMAL
LYMPHOCYTES # BLD AUTO: 0.74 K/UL (ref 1–4.8)
LYMPHOCYTES NFR BLD: 92 % (ref 22–41)
MANUAL DIFF BLD: NORMAL
MCH RBC QN AUTO: 27.8 PG (ref 27–33)
MCHC RBC AUTO-ENTMCNC: 34.8 G/DL (ref 32.3–36.5)
MCV RBC AUTO: 80.1 FL (ref 81.4–97.8)
MICROCYTES BLD QL SMEAR: ABNORMAL
MONOCYTES # BLD AUTO: 0 K/UL (ref 0–0.85)
MONOCYTES NFR BLD AUTO: 0 % (ref 0–13.4)
MORPHOLOGY BLD-IMP: NORMAL
NEUTROPHILS # BLD AUTO: 0.06 K/UL (ref 1.82–7.42)
NEUTROPHILS NFR BLD: 8 % (ref 44–72)
NRBC # BLD AUTO: 0 K/UL
NRBC BLD-RTO: 0 /100 WBC (ref 0–0.2)
PLATELET # BLD AUTO: 3 K/UL (ref 164–446)
PLATELET BLD QL SMEAR: NORMAL
PLATELETS.RETICULATED NFR BLD AUTO: 9.3 % (ref 0.6–13.1)
PMV BLD AUTO: 12.4 FL (ref 9–12.9)
PRODUCT TYPE UPROD: NORMAL
RBC # BLD AUTO: 3.16 M/UL (ref 4.7–6.1)
RBC BLD AUTO: PRESENT
RH BLD: NORMAL
UNIT STATUS USTAT: NORMAL
WBC # BLD AUTO: 0.8 K/UL (ref 4.8–10.8)

## 2025-03-16 PROCEDURE — 85027 COMPLETE CBC AUTOMATED: CPT

## 2025-03-16 PROCEDURE — 86901 BLOOD TYPING SEROLOGIC RH(D): CPT

## 2025-03-16 PROCEDURE — 86945 BLOOD PRODUCT/IRRADIATION: CPT

## 2025-03-16 PROCEDURE — 85055 RETICULATED PLATELET ASSAY: CPT

## 2025-03-16 PROCEDURE — A9270 NON-COVERED ITEM OR SERVICE: HCPCS | Mod: UD | Performed by: PEDIATRICS

## 2025-03-16 PROCEDURE — 306780 HCHG STAT FOR TRANSFUSION PER CASE

## 2025-03-16 PROCEDURE — 85007 BL SMEAR W/DIFF WBC COUNT: CPT

## 2025-03-16 PROCEDURE — 86900 BLOOD TYPING SEROLOGIC ABO: CPT

## 2025-03-16 PROCEDURE — 700111 HCHG RX REV CODE 636 W/ 250 OVERRIDE (IP): Mod: UD

## 2025-03-16 PROCEDURE — 36430 TRANSFUSION BLD/BLD COMPNT: CPT

## 2025-03-16 PROCEDURE — P9034 PLATELETS, PHERESIS: HCPCS

## 2025-03-16 PROCEDURE — 700102 HCHG RX REV CODE 250 W/ 637 OVERRIDE(OP): Mod: UD | Performed by: PEDIATRICS

## 2025-03-16 PROCEDURE — 86850 RBC ANTIBODY SCREEN: CPT

## 2025-03-16 RX ORDER — DIPHENHYDRAMINE HYDROCHLORIDE 50 MG/ML
25 INJECTION, SOLUTION INTRAMUSCULAR; INTRAVENOUS PRN
Status: COMPLETED | OUTPATIENT
Start: 2025-03-16 | End: 2025-03-16

## 2025-03-16 RX ORDER — ONDANSETRON 4 MG/1
4 TABLET, ORALLY DISINTEGRATING ORAL ONCE
Status: COMPLETED | OUTPATIENT
Start: 2025-03-16 | End: 2025-03-16

## 2025-03-16 RX ORDER — ACETAMINOPHEN 325 MG/1
650 TABLET ORAL PRN
Status: COMPLETED | OUTPATIENT
Start: 2025-03-16 | End: 2025-03-16

## 2025-03-16 RX ORDER — HYDROCORTISONE SODIUM SUCCINATE 100 MG/2ML
100 INJECTION INTRAMUSCULAR; INTRAVENOUS PRN
OUTPATIENT
Start: 2025-03-16

## 2025-03-16 RX ORDER — DIPHENHYDRAMINE HYDROCHLORIDE 50 MG/ML
25 INJECTION, SOLUTION INTRAMUSCULAR; INTRAVENOUS PRN
OUTPATIENT
Start: 2025-03-16

## 2025-03-16 RX ORDER — HYDROCORTISONE SODIUM SUCCINATE 100 MG/2ML
100 INJECTION INTRAMUSCULAR; INTRAVENOUS PRN
Status: DISCONTINUED | OUTPATIENT
Start: 2025-03-16 | End: 2025-03-16 | Stop reason: HOSPADM

## 2025-03-16 RX ORDER — ACETAMINOPHEN 325 MG/1
650 TABLET ORAL PRN
OUTPATIENT
Start: 2025-03-16

## 2025-03-16 RX ADMIN — ACETAMINOPHEN 650 MG: 325 TABLET ORAL at 18:40

## 2025-03-16 RX ADMIN — ONDANSETRON 4 MG: 4 TABLET, ORALLY DISINTEGRATING ORAL at 16:07

## 2025-03-16 ASSESSMENT — PAIN DESCRIPTION - PAIN TYPE: TYPE: ACUTE PAIN

## 2025-03-16 ASSESSMENT — FIBROSIS 4 INDEX: FIB4 SCORE: 21.04

## 2025-03-16 NOTE — TELEPHONE ENCOUNTER
Received Renewal request via [facility]  for imatinib (GLEEVEC) 100 MG tablet . (Quantity:75, Day Supply:30)     Insurance: [employer] (Primary)  Member ID:  [id number]  BIN: [id number]  PCN: [id number]  Group: [id number]     Ran Test claim via [place] & medication  cannot be filled through [facility] per insurance rejection. Still unable to fill with [facility] after placing override codes    Prescription will be released to [facility] pharmacy for processing: Emily Benito [facility]   Pharmacy Liaison  [phone]

## 2025-03-16 NOTE — TELEPHONE ENCOUNTER
Received Renewal request via [facility]  for imatinib (GLEEVEC) 400 MG tablet . (Quantity:30, Day Supply:30)     Insurance: [employer] (Primary)  Member ID:  [id number]  BIN: [id number]  PCN: [id number]  Group: [id number]     Ran Test claim via [place] & medication  is a refill too soon until  4/3/25    Prescription will be released to preferred pharmacy for processing: Emily Benito [facility]   Pharmacy Liaison  [phone]

## 2025-03-16 NOTE — TELEPHONE ENCOUNTER
Drug: ondansetron (ZOFRAN ODT) 4 MG TABLET DISPERSIBLE      I called and spoke to  this evening to give him a heads up that I will have both of his Imatinib  (Gleevec) prescriptions released to [facility] on Monday 3/17/25, so they don't get lost in transmission over the weekend if they don't electronically send. He was grateful for the call. He mentioned he has already called [facility] to set up his profile and verify the necessary demographic information.    We also discussed his Prednisone and Zofran prescription. He would like both of those to be filled with [facility]. From what I can see the Zofran was sent to [facility] in [place] and he no longer lives in that area. Can we please have the Zofran reordered for [place]?     Thank you    Josh Benito [facility]   Pharmacy Liaison  [phone]

## 2025-03-17 DIAGNOSIS — C91.Z0 B LYMPHOBLASTIC LEUKEMIA WITH T(9;22)(Q34;Q11.2);BCR-ABL1 (HCC): ICD-10-CM

## 2025-03-17 LAB — TEST NAME 95000: ABNORMAL

## 2025-03-17 RX ORDER — DASATINIB 70 MG/1
70 TABLET, FILM COATED ORAL 2 TIMES DAILY
Qty: 60 TABLET | Refills: 0 | Status: ON HOLD | OUTPATIENT
Start: 2025-03-17 | End: 2025-04-16

## 2025-03-17 NOTE — PROGRESS NOTES
"Pharmacy Chemotherapy Verification Note:    Dx: B-ALL, PH+ relapsed        Protocol: Individualized 3 Drug Reinduction + Imatininb     IT methotrexate 15 mg for age >/=9 yrs on Days 1, and 29   3/14/25 Dr. Faye omitting Day 8, pt is CNS negative upon relapse.   3/21/25 Confirmed Dr. Parker  is holding day 29 as well.   Vincristine 1.5 mg/m2 (max 2 mg) IV over 5-10 mins on Days 1, 8, 15, and 22  Prednisone 30 mg/m2 PO BID on Days 1-28  Pegaspargase 2000 units/m2 IV over 1-2 hrs on Day 4  Imatinib 340 mg/m2 PO daily (split BID if dose >600 mg)    Dosing references: [facility] protocols [id number] and 1631 (imantinib)  Plan to transition to Blincyto consolidation after induction    Allergies:  Amoxicillin     /58   Pulse (!) 108   Temp 37.1 °C (98.8 °F) (Temporal)   Resp 18   Ht 1.65 m (5' 4.96\")   Wt 72.4 kg (159 lb 9.8 oz)   SpO2 99%   BMI 26.59 kg/m²  Body surface area is 1.82 meters squared.    Labs 3/21/25:  ANC~ 20 Plt = 15k   Hgb = 8.2     SCr = 0.82 mg/dL CrCl > 125 mL/min   AST/ALT/AP = 65/250/102 TBili = 2  DBili = 0.7  K+ = 4.2  *Ok to proceed with Day 15 per Dr. Faye with CMP results, pt will be transfused for low counts.      Drug Order   (Drug name, dose, route, IV Fluid & volume, frequency, number of doses) Cycle: Induction Day 15  Previous treatment: D8 3/14/25; completed initial treatment 5/30/24     Medication = IT methotrexate  Base Dose = 15 mg fixed dose  Calc Dose: n/a  Final Dose = -- mg  Route = intraTHECAL  Fluid & Volume = NS 6 mL  Admin Duration = n/a   Days 1, 8, and 29   Dr. Faye omitting Day 8 and 29, pt is CNS negative upon relapse.       <10% difference, okay to treat with final dose   Medication = vincristine  Base Dose = 1.5 mg/m2 (max 2 mg)  Calc Dose: Base Dose x 1.82 m2 = 2.73 mg  Final Dose = 2 mg(MAX)  Route = IV  Fluid & Volume = NS 25 mL  Admin Duration = Over 5-10 mins   Days 1, 8, 15, and 22       okay to treat with max final dose   Medication = pegaspargase " (Oncaspar)  Base Dose = 2000 units/m2  Calc Dose:Base Dose x 1.9 m2 = 3800 units  Final Dose = 3720 units  Route = IV  Fluid & Volume =  mL  Admin Duration = Over 2 hrs   Day 4       <10% difference, okay to treat with final dose     By my signature below, I confirm this process was performed independently with the BSA and all final chemotherapy dosing calculations congruent. I have reviewed the above chemotherapy order and that my calculation of the final dose and BSA (when applicable) corroborate those calculations of the  pharmacist.     Marifer Torres, PharmD

## 2025-03-17 NOTE — PROGRESS NOTES
Pt arrived for lab draw and possible platelet transfusion today. Pt c/o of severe fatigue and dyspnea with exertion. CVC double lumen accessed utilizing sterile technique.  CVC flushes easily with brisk blood. Labs drawn. Pt met parameters for platelets 1 unit. Blood transfusion consents up to date. Potential side effects reviewed. .  Pt c/o of nausea and RN notified Dr. Duenas. Francisco ordered and given with good relief.     1 unit platelets transfused. Pt c/o of some chest heaviness during transfusion. Transfusion slowed down to 250ml/hr and tylenol given with good relief. VS remained stable. Pt tolerated remainder of platelet infusion without s/s of reaction. CVC flushed with positive blood return per protocol. Pt taken by RN to OP pharmacy to  meds via w/c. Aunt transported home. Pt confirmed f/o up apt for this Friday.

## 2025-03-18 ENCOUNTER — TELEPHONE (OUTPATIENT)
Dept: PEDIATRIC HEMATOLOGY/ONCOLOGY | Facility: MEDICAL CENTER | Age: 25
End: 2025-03-18
Payer: COMMERCIAL

## 2025-03-18 NOTE — TELEPHONE ENCOUNTER
Drug: dasatinib (SPRYCEL) 70 MG tablet     Called [facility] @ [phone] and spoke with Kavya ROWE    I was able to cancel both of the Imatinib prescriptions (Gleevec) and put an expedited rush on the Dasatinib.     I made sure the primary and secondary insurance plans on file were updated and accurate based on the information we have for him in our system and have also requested they reach out to the office if they run into any issues while processing.    Total time of call: 47 minutes     Josh Benito [facility]   Pharmacy Liaison  [phone]

## 2025-03-20 DIAGNOSIS — C91.Z0 B LYMPHOBLASTIC LEUKEMIA WITH T(9;22)(Q34;Q11.2);BCR-ABL1 (HCC): ICD-10-CM

## 2025-03-21 ENCOUNTER — PHARMACY VISIT (OUTPATIENT)
Dept: PHARMACY | Facility: MEDICAL CENTER | Age: 25
End: 2025-03-21
Payer: COMMERCIAL

## 2025-03-21 ENCOUNTER — OUTPATIENT INFUSION SERVICES (OUTPATIENT)
Dept: ONCOLOGY | Facility: MEDICAL CENTER | Age: 25
End: 2025-03-21
Attending: PEDIATRICS
Payer: COMMERCIAL

## 2025-03-21 VITALS
DIASTOLIC BLOOD PRESSURE: 58 MMHG | WEIGHT: 159.61 LBS | RESPIRATION RATE: 18 BRPM | HEIGHT: 65 IN | BODY MASS INDEX: 26.59 KG/M2 | SYSTOLIC BLOOD PRESSURE: 114 MMHG | OXYGEN SATURATION: 99 % | TEMPERATURE: 98.8 F | HEART RATE: 108 BPM

## 2025-03-21 DIAGNOSIS — T45.1X5A CHEMOTHERAPY INDUCED NAUSEA AND VOMITING: ICD-10-CM

## 2025-03-21 DIAGNOSIS — R11.2 CHEMOTHERAPY INDUCED NAUSEA AND VOMITING: ICD-10-CM

## 2025-03-21 DIAGNOSIS — C91.Z0 B LYMPHOBLASTIC LEUKEMIA WITH T(9;22)(Q34;Q11.2);BCR-ABL1 (HCC): ICD-10-CM

## 2025-03-21 DIAGNOSIS — K59.00 CONSTIPATION, UNSPECIFIED CONSTIPATION TYPE: ICD-10-CM

## 2025-03-21 LAB
ALBUMIN SERPL BCP-MCNC: 3.2 G/DL (ref 3.2–4.9)
ALBUMIN/GLOB SERPL: 2.1 G/DL
ALP SERPL-CCNC: 102 U/L (ref 30–99)
ALT SERPL-CCNC: 250 U/L (ref 2–50)
ANION GAP SERPL CALC-SCNC: 13 MMOL/L (ref 7–16)
ANISOCYTOSIS BLD QL SMEAR: ABNORMAL
AST SERPL-CCNC: 65 U/L (ref 12–45)
BASOPHILS # BLD AUTO: 0 % (ref 0–1.8)
BASOPHILS # BLD: 0 K/UL (ref 0–0.12)
BILIRUB CONJ SERPL-MCNC: 0.7 MG/DL (ref 0.1–0.5)
BILIRUB INDIRECT SERPL-MCNC: 1.3 MG/DL (ref 0–1)
BILIRUB SERPL-MCNC: 2 MG/DL (ref 0.1–1.5)
BUN SERPL-MCNC: 20 MG/DL (ref 8–22)
CALCIUM ALBUM COR SERPL-MCNC: 8.1 MG/DL (ref 8.5–10.5)
CALCIUM SERPL-MCNC: 7.5 MG/DL (ref 8.5–10.5)
CHLORIDE SERPL-SCNC: 101 MMOL/L (ref 96–112)
CO2 SERPL-SCNC: 21 MMOL/L (ref 20–33)
COMMENT NL1176: NORMAL
CREAT SERPL-MCNC: 0.82 MG/DL (ref 0.5–1.4)
EOSINOPHIL # BLD AUTO: 0 K/UL (ref 0–0.51)
EOSINOPHIL NFR BLD: 0 % (ref 0–6.9)
ERYTHROCYTE [DISTWIDTH] IN BLOOD BY AUTOMATED COUNT: 37 FL (ref 35.9–50)
GFR SERPLBLD CREATININE-BSD FMLA CKD-EPI: 126 ML/MIN/1.73 M 2
GLOBULIN SER CALC-MCNC: 1.5 G/DL (ref 1.9–3.5)
GLUCOSE SERPL-MCNC: 140 MG/DL (ref 65–99)
HCT VFR BLD AUTO: 23.6 % (ref 42–52)
HGB BLD-MCNC: 8.2 G/DL (ref 14–18)
LYMPHOCYTES # BLD AUTO: 0.37 K/UL (ref 1–4.8)
LYMPHOCYTES NFR BLD: 93.1 % (ref 22–41)
MANUAL DIFF BLD: NORMAL
MCH RBC QN AUTO: 27.7 PG (ref 27–33)
MCHC RBC AUTO-ENTMCNC: 34.7 G/DL (ref 32.3–36.5)
MCV RBC AUTO: 79.7 FL (ref 81.4–97.8)
MICROCYTES BLD QL SMEAR: ABNORMAL
MONOCYTES # BLD AUTO: 0.01 K/UL (ref 0–0.85)
MONOCYTES NFR BLD AUTO: 1.7 % (ref 0–13.4)
MORPHOLOGY BLD-IMP: NORMAL
NEUTROPHILS # BLD AUTO: 0.02 K/UL (ref 1.82–7.42)
NEUTROPHILS NFR BLD: 5.2 % (ref 44–72)
NRBC # BLD AUTO: 0.02 K/UL
NRBC BLD-RTO: 4.9 /100 WBC (ref 0–0.2)
PLATELET # BLD AUTO: 15 K/UL (ref 164–446)
PLATELET BLD QL SMEAR: NORMAL
PLATELETS.RETICULATED NFR BLD AUTO: 7.6 % (ref 0.6–13.1)
PMV BLD AUTO: 12.2 FL (ref 9–12.9)
POTASSIUM SERPL-SCNC: 4.2 MMOL/L (ref 3.6–5.5)
PROT SERPL-MCNC: 4.7 G/DL (ref 6–8.2)
RBC # BLD AUTO: 2.96 M/UL (ref 4.7–6.1)
RBC BLD AUTO: PRESENT
SODIUM SERPL-SCNC: 135 MMOL/L (ref 135–145)
WBC # BLD AUTO: 0.4 K/UL (ref 4.8–10.8)

## 2025-03-21 PROCEDURE — 85007 BL SMEAR W/DIFF WBC COUNT: CPT

## 2025-03-21 PROCEDURE — 700111 HCHG RX REV CODE 636 W/ 250 OVERRIDE (IP): Mod: JZ,UD | Performed by: PEDIATRICS

## 2025-03-21 PROCEDURE — 700105 HCHG RX REV CODE 258: Mod: UD | Performed by: PEDIATRICS

## 2025-03-21 PROCEDURE — 82657 ENZYME CELL ACTIVITY: CPT | Mod: 91

## 2025-03-21 PROCEDURE — 85027 COMPLETE CBC AUTOMATED: CPT

## 2025-03-21 PROCEDURE — RXOTC WILLOW AMBULATORY OTC CHARGE

## 2025-03-21 PROCEDURE — 96409 CHEMO IV PUSH SNGL DRUG: CPT

## 2025-03-21 PROCEDURE — 85055 RETICULATED PLATELET ASSAY: CPT

## 2025-03-21 PROCEDURE — 96375 TX/PRO/DX INJ NEW DRUG ADDON: CPT

## 2025-03-21 PROCEDURE — 82248 BILIRUBIN DIRECT: CPT

## 2025-03-21 PROCEDURE — 80053 COMPREHEN METABOLIC PANEL: CPT

## 2025-03-21 RX ORDER — ONDANSETRON 4 MG/1
8 TABLET, ORALLY DISINTEGRATING ORAL EVERY 6 HOURS PRN
Qty: 20 TABLET | Refills: 3 | Status: ON HOLD | OUTPATIENT
Start: 2025-03-21

## 2025-03-21 RX ORDER — ONDANSETRON 2 MG/ML
8 INJECTION INTRAMUSCULAR; INTRAVENOUS ONCE
Status: COMPLETED | OUTPATIENT
Start: 2025-03-21 | End: 2025-03-21

## 2025-03-21 RX ORDER — AMOXICILLIN 250 MG
1 CAPSULE ORAL DAILY
Qty: 30 TABLET | Refills: 0 | Status: ON HOLD | OUTPATIENT
Start: 2025-03-21

## 2025-03-21 RX ADMIN — ONDANSETRON 8 MG: 2 INJECTION INTRAMUSCULAR; INTRAVENOUS at 09:20

## 2025-03-21 RX ADMIN — VINCRISTINE SULFATE 2 MG: 1 INJECTION, SOLUTION INTRAVENOUS at 09:40

## 2025-03-21 ASSESSMENT — FIBROSIS 4 INDEX: FIB4 SCORE: 21.04

## 2025-03-21 NOTE — PROGRESS NOTES
"Pharmacy Chemotherapy Verification    Dx: relapsed B-ALL (CNS3)     Cycle 1, Day 15  Previous treatment = C1D8 3/14/25; Maintenance [id number] C5D29 (5/20/24)    Protocol: Individualized 3 Drug Reinduction + Imatininb  IT methotrexate 15 mg for age >/=9 yrs on Days 1, 8, and 29  -per Dr Faye omit IT MTX for Day 8 (CNS negative)  -Day 29 to be omitted per MD  VinCRIStine 1.5 mg/m2 (max 2 mg) IV over 5-10 mins on Days 1, 8, 15, and 22  Prednisone 30 mg/m2 PO BID on Days 1-28  Pegaspargase 2000 units/m2 IV over 1-2 hrs on Day 4  Imatinib 340 mg/m2 PO daily (split BID if dose >600 mg)  Dosing references: [facility] protocols [id number]/1732 and 1631 (imantinib)  Plan to transition to Blincyto consolidation after induction      Allergies:  Amoxicillin    /58   Pulse (!) 108   Temp 37.1 °C (98.8 °F) (Temporal)   Resp 18   Ht 1.65 m (5' 4.96\")   Wt 72.4 kg (159 lb 9.8 oz)   SpO2 99%   BMI 26.59 kg/m²  Body surface area is 1.82 meters squared.    Labs 3/16/25  ANC 60       Plt 3 k             Hgb 8.8    Labs 3/21/25  SCr 0.82 CrCl >125 mL/min   AST/ALT/AP = 65/250/102 Tbili 0.2  Dbili 0.7    **MD aware of labs, OK to proceed with treatment as outlined below.**    VinCRIStine 1.5 mg/m² x 1.82 m² = > 2 mg   <10% difference, okay to treat with final dose = 2 mg IV     Ibeth Paulino, PharmD, BCOP    "

## 2025-03-21 NOTE — TELEPHONE ENCOUNTER
Drug: Dasatinib (SPRYCEL) 70 MG tablet    Called [facility] @ [phone] and spoke with Griselle.    Last statues documented on their end is that the Dasatinib is in 'Final Pharmacist Review' and that the medication has been expedited on 03/18. Per Josh's last call. Currently no ETA at this time. Pharmacy will notify patient when medication is ready to be scheduled for delivery.    Aureliano Chiu [facility]  Central Pharmacy Liaison (Rx Coordinator)  3/21/2025 10:59 AM

## 2025-03-21 NOTE — PROGRESS NOTES
"Pt arrives to [facility] for cycle 1 day 15 of Vincristine.  Discussed plan of care with pt.  Pt denies s/sx of infection.  Pt reports palpitations this morning.  Heart rate 130 upon arrival.  Pt was escorted in by security via wheelchair due to weakness and \"heart pounding\".  Heart rate re-checked while at rest for 15 minutes and .   YASMINE double lumen CVC flushed with NS and brisk blood return observed to each lumen.  Labs drawn via CVC.  Dr. Faye saw pt a chair side.  Dr. Faye reviewed CMP.  Received verbal order for ok to proceed with treatment today.  Labs reviewed and results meet parameters for treatment.  Pt did not require blood products today.  Pre-medications given.  Vincristine infused without adverse reaction.  CVC flushed with NS, saline locked, clamped and capped.   Dressing and claves changed per protocol.  Per Dr. Faye, schedule appt for possible blood products in a few days.  Confirmed next appt on 3/23/2025 with pt.  Pt dc home with his girlfriend as transportation.   "

## 2025-03-21 NOTE — PROGRESS NOTES
Chemotherapy Verification - SECONDARY RN       Height = 165cm  Weight = 72.4kg  BSA = 1.82       Medication: Vincristine  Dose: 2mg  Calculated Dose: 2mg standard dose                               (In mg/m2, AUC, mg/kg)       I confirm that this process was performed independently.

## 2025-03-21 NOTE — PROGRESS NOTES
Chemotherapy Verification - PRIMARY RN    C1 D15    Height = 165 cm  Weight = 72.4 kg  BSA = 1.82 m^2       Medication: Vincristine  Dose: set dose 2 mg  Calculated Dose: 2 mg set dose                             (In mg/m2, AUC, mg/kg)     I confirm this process was performed independently with the BSA and all final chemotherapy dosing calculations congruent.  Any discrepancies of 10% or greater have been addressed with the chemotherapy pharmacist. The resolution of the discrepancy has been documented in the EPIC progress notes.

## 2025-03-22 ENCOUNTER — HOSPITAL ENCOUNTER (INPATIENT)
Facility: MEDICAL CENTER | Age: 25
End: 2025-03-22
Attending: PEDIATRICS | Admitting: PEDIATRICS
Payer: COMMERCIAL

## 2025-03-22 DIAGNOSIS — R10.84 GENERALIZED ABDOMINAL PAIN: ICD-10-CM

## 2025-03-22 DIAGNOSIS — C91.Z0 B LYMPHOBLASTIC LEUKEMIA WITH T(9;22)(Q34;Q11.2);BCR-ABL1 (HCC): ICD-10-CM

## 2025-03-22 DIAGNOSIS — E83.51 HYPOCALCEMIA: ICD-10-CM

## 2025-03-22 DIAGNOSIS — I95.9 HYPOTENSION, UNSPECIFIED HYPOTENSION TYPE: ICD-10-CM

## 2025-03-22 DIAGNOSIS — G72.0 STEROID-INDUCED MYOPATHY: ICD-10-CM

## 2025-03-22 DIAGNOSIS — R11.0 NAUSEA: ICD-10-CM

## 2025-03-22 DIAGNOSIS — T38.0X5A STEROID-INDUCED MYOPATHY: ICD-10-CM

## 2025-03-22 DIAGNOSIS — E55.9 HYPOVITAMINOSIS D: ICD-10-CM

## 2025-03-22 DIAGNOSIS — M54.50 LOW BACK PAIN, UNSPECIFIED BACK PAIN LATERALITY, UNSPECIFIED CHRONICITY, UNSPECIFIED WHETHER SCIATICA PRESENT: ICD-10-CM

## 2025-03-22 DIAGNOSIS — T82.9XXA CENTRAL LINE COMPLICATION, INITIAL ENCOUNTER: ICD-10-CM

## 2025-03-22 DIAGNOSIS — T45.1X5A CINV (CHEMOTHERAPY-INDUCED NAUSEA AND VOMITING): ICD-10-CM

## 2025-03-22 DIAGNOSIS — R78.81 BACTEREMIA: ICD-10-CM

## 2025-03-22 DIAGNOSIS — C91.02 ACUTE LYMPHOBLASTIC LEUKEMIA (ALL) IN RELAPSE (HCC): ICD-10-CM

## 2025-03-22 DIAGNOSIS — R63.0 APPETITE LOSS: ICD-10-CM

## 2025-03-22 DIAGNOSIS — R10.9 ABDOMINAL PAIN, UNSPECIFIED ABDOMINAL LOCATION: ICD-10-CM

## 2025-03-22 DIAGNOSIS — R11.2 CINV (CHEMOTHERAPY-INDUCED NAUSEA AND VOMITING): ICD-10-CM

## 2025-03-22 LAB
ABO GROUP BLD: NORMAL
ALBUMIN SERPL BCP-MCNC: 2.8 G/DL (ref 3.2–4.9)
ALBUMIN/GLOB SERPL: 2 G/DL
ALP SERPL-CCNC: 106 U/L (ref 30–99)
ALT SERPL-CCNC: 252 U/L (ref 2–50)
ANION GAP SERPL CALC-SCNC: 11 MMOL/L (ref 7–16)
AST SERPL-CCNC: 73 U/L (ref 12–45)
BARCODED ABORH UBTYP: 5100
BARCODED PRD CODE UBPRD: NORMAL
BARCODED UNIT NUM UBUNT: NORMAL
BASOPHILS # BLD AUTO: 0 % (ref 0–1.8)
BASOPHILS # BLD: 0 K/UL (ref 0–0.12)
BILIRUB SERPL-MCNC: 1.6 MG/DL (ref 0.1–1.5)
BLD GP AB SCN SERPL QL: NORMAL
BUN SERPL-MCNC: 18 MG/DL (ref 8–22)
CALCIUM ALBUM COR SERPL-MCNC: 8.2 MG/DL (ref 8.5–10.5)
CALCIUM SERPL-MCNC: 7.2 MG/DL (ref 8.5–10.5)
CHLORIDE SERPL-SCNC: 103 MMOL/L (ref 96–112)
CO2 SERPL-SCNC: 20 MMOL/L (ref 20–33)
COMPONENT P 8504P: NORMAL
COMPONENT R 8504R: NORMAL
COMPONENT R 8504R: NORMAL
CREAT SERPL-MCNC: 0.61 MG/DL (ref 0.5–1.4)
EOSINOPHIL # BLD AUTO: 0 K/UL (ref 0–0.51)
EOSINOPHIL NFR BLD: 0 % (ref 0–6.9)
ERYTHROCYTE [DISTWIDTH] IN BLOOD BY AUTOMATED COUNT: 37.5 FL (ref 35.9–50)
GFR SERPLBLD CREATININE-BSD FMLA CKD-EPI: 138 ML/MIN/1.73 M 2
GLOBULIN SER CALC-MCNC: 1.4 G/DL (ref 1.9–3.5)
GLUCOSE SERPL-MCNC: 125 MG/DL (ref 65–99)
HCT VFR BLD AUTO: 21.5 % (ref 42–52)
HGB BLD-MCNC: 7.4 G/DL (ref 14–18)
LYMPHOCYTES # BLD AUTO: 0.26 K/UL (ref 1–4.8)
LYMPHOCYTES NFR BLD: 86.7 % (ref 22–41)
MAGNESIUM SERPL-MCNC: 2.2 MG/DL (ref 1.5–2.5)
MANUAL DIFF BLD: NORMAL
MCH RBC QN AUTO: 27.8 PG (ref 27–33)
MCHC RBC AUTO-ENTMCNC: 34.4 G/DL (ref 32.3–36.5)
MCV RBC AUTO: 80.8 FL (ref 81.4–97.8)
MONOCYTES # BLD AUTO: 0 K/UL (ref 0–0.85)
MONOCYTES NFR BLD AUTO: 0 % (ref 0–13.4)
MORPHOLOGY BLD-IMP: NORMAL
NEUTROPHILS # BLD AUTO: 0.04 K/UL (ref 1.82–7.42)
NEUTROPHILS NFR BLD: 13.3 % (ref 44–72)
NRBC # BLD AUTO: 0.02 K/UL
NRBC BLD-RTO: 6.3 /100 WBC (ref 0–0.2)
PHOSPHATE SERPL-MCNC: 3 MG/DL (ref 2.5–4.5)
PLATELET # BLD AUTO: 5 K/UL (ref 164–446)
PLATELET BLD QL SMEAR: NORMAL
PLATELETS.RETICULATED NFR BLD AUTO: 7.8 % (ref 0.6–13.1)
PMV BLD AUTO: 12.8 FL (ref 9–12.9)
POTASSIUM SERPL-SCNC: 4.2 MMOL/L (ref 3.6–5.5)
PRODUCT TYPE UPROD: NORMAL
PROT SERPL-MCNC: 4.2 G/DL (ref 6–8.2)
RBC # BLD AUTO: 2.66 M/UL (ref 4.7–6.1)
RBC BLD AUTO: NORMAL
RH BLD: NORMAL
SODIUM SERPL-SCNC: 134 MMOL/L (ref 135–145)
UNIT STATUS USTAT: NORMAL
WBC # BLD AUTO: 0.3 K/UL (ref 4.8–10.8)

## 2025-03-22 PROCEDURE — 302131 K PAD MOTOR: Performed by: PEDIATRICS

## 2025-03-22 PROCEDURE — 83735 ASSAY OF MAGNESIUM: CPT

## 2025-03-22 PROCEDURE — 36415 COLL VENOUS BLD VENIPUNCTURE: CPT

## 2025-03-22 PROCEDURE — 85007 BL SMEAR W/DIFF WBC COUNT: CPT

## 2025-03-22 PROCEDURE — 87077 CULTURE AEROBIC IDENTIFY: CPT

## 2025-03-22 PROCEDURE — 86900 BLOOD TYPING SEROLOGIC ABO: CPT

## 2025-03-22 PROCEDURE — 99223 1ST HOSP IP/OBS HIGH 75: CPT | Performed by: PEDIATRICS

## 2025-03-22 PROCEDURE — 87150 DNA/RNA AMPLIFIED PROBE: CPT

## 2025-03-22 PROCEDURE — 87040 BLOOD CULTURE FOR BACTERIA: CPT

## 2025-03-22 PROCEDURE — 700105 HCHG RX REV CODE 258: Performed by: PEDIATRICS

## 2025-03-22 PROCEDURE — P9034 PLATELETS, PHERESIS: HCPCS

## 2025-03-22 PROCEDURE — 30243R1 TRANSFUSION OF NONAUTOLOGOUS PLATELETS INTO CENTRAL VEIN, PERCUTANEOUS APPROACH: ICD-10-PCS | Performed by: PEDIATRICS

## 2025-03-22 PROCEDURE — 85055 RETICULATED PLATELET ASSAY: CPT

## 2025-03-22 PROCEDURE — 700102 HCHG RX REV CODE 250 W/ 637 OVERRIDE(OP): Performed by: PEDIATRICS

## 2025-03-22 PROCEDURE — 80053 COMPREHEN METABOLIC PANEL: CPT

## 2025-03-22 PROCEDURE — 84100 ASSAY OF PHOSPHORUS: CPT

## 2025-03-22 PROCEDURE — 86850 RBC ANTIBODY SCREEN: CPT

## 2025-03-22 PROCEDURE — 770004 HCHG ROOM/CARE - ONCOLOGY PRIVATE *

## 2025-03-22 PROCEDURE — 36430 TRANSFUSION BLD/BLD COMPNT: CPT

## 2025-03-22 PROCEDURE — 94760 N-INVAS EAR/PLS OXIMETRY 1: CPT

## 2025-03-22 PROCEDURE — 86901 BLOOD TYPING SEROLOGIC RH(D): CPT

## 2025-03-22 PROCEDURE — A9270 NON-COVERED ITEM OR SERVICE: HCPCS | Performed by: PEDIATRICS

## 2025-03-22 PROCEDURE — 85027 COMPLETE CBC AUTOMATED: CPT

## 2025-03-22 PROCEDURE — 302151 K-PAD 14X20: Performed by: PEDIATRICS

## 2025-03-22 PROCEDURE — 86945 BLOOD PRODUCT/IRRADIATION: CPT

## 2025-03-22 PROCEDURE — 700111 HCHG RX REV CODE 636 W/ 250 OVERRIDE (IP): Performed by: PEDIATRICS

## 2025-03-22 RX ORDER — LIDOCAINE AND PRILOCAINE 25; 25 MG/G; MG/G
CREAM TOPICAL PRN
Status: DISCONTINUED | OUTPATIENT
Start: 2025-03-22 | End: 2025-04-09 | Stop reason: HOSPADM

## 2025-03-22 RX ORDER — METRONIDAZOLE 500 MG/100ML
500 INJECTION, SOLUTION INTRAVENOUS EVERY 12 HOURS
Status: DISCONTINUED | OUTPATIENT
Start: 2025-03-22 | End: 2025-03-26

## 2025-03-22 RX ORDER — BENZOCAINE/MENTHOL 6 MG-10 MG
LOZENGE MUCOUS MEMBRANE 2 TIMES DAILY
Status: DISCONTINUED | OUTPATIENT
Start: 2025-03-22 | End: 2025-04-09 | Stop reason: HOSPADM

## 2025-03-22 RX ORDER — CALCIUM CARBONATE 500 MG/1
1000 TABLET, CHEWABLE ORAL 2 TIMES DAILY
Status: DISCONTINUED | OUTPATIENT
Start: 2025-03-22 | End: 2025-03-30

## 2025-03-22 RX ORDER — ETHYL ALCOHOL 62 %
1 SWAB, MEDICATED TOPICAL 2 TIMES DAILY
Status: DISCONTINUED | OUTPATIENT
Start: 2025-03-23 | End: 2025-04-09 | Stop reason: HOSPADM

## 2025-03-22 RX ORDER — FAMOTIDINE 20 MG/1
20 TABLET, FILM COATED ORAL 2 TIMES DAILY
Status: DISCONTINUED | OUTPATIENT
Start: 2025-03-22 | End: 2025-03-22

## 2025-03-22 RX ORDER — AMOXICILLIN 250 MG
2 CAPSULE ORAL 2 TIMES DAILY
Status: DISCONTINUED | OUTPATIENT
Start: 2025-03-22 | End: 2025-04-09 | Stop reason: HOSPADM

## 2025-03-22 RX ORDER — SODIUM CHLORIDE 9 MG/ML
INJECTION, SOLUTION INTRAVENOUS CONTINUOUS
Status: DISCONTINUED | OUTPATIENT
Start: 2025-03-22 | End: 2025-04-09 | Stop reason: HOSPADM

## 2025-03-22 RX ORDER — SULFAMETHOXAZOLE AND TRIMETHOPRIM 800; 160 MG/1; MG/1
1 TABLET ORAL
Status: DISCONTINUED | OUTPATIENT
Start: 2025-03-22 | End: 2025-04-09 | Stop reason: HOSPADM

## 2025-03-22 RX ORDER — OXYCODONE HYDROCHLORIDE 5 MG/1
5-10 TABLET ORAL EVERY 4 HOURS PRN
Refills: 0 | Status: DISCONTINUED | OUTPATIENT
Start: 2025-03-22 | End: 2025-04-09 | Stop reason: HOSPADM

## 2025-03-22 RX ORDER — ONDANSETRON 2 MG/ML
8 INJECTION INTRAMUSCULAR; INTRAVENOUS EVERY 8 HOURS PRN
Status: DISCONTINUED | OUTPATIENT
Start: 2025-03-22 | End: 2025-03-24

## 2025-03-22 RX ORDER — MORPHINE SULFATE 4 MG/ML
2 INJECTION INTRAVENOUS
Status: DISCONTINUED | OUTPATIENT
Start: 2025-03-22 | End: 2025-04-09 | Stop reason: HOSPADM

## 2025-03-22 RX ORDER — LORAZEPAM 2 MG/ML
1 INJECTION INTRAMUSCULAR EVERY 6 HOURS PRN
Status: DISCONTINUED | OUTPATIENT
Start: 2025-03-22 | End: 2025-04-09 | Stop reason: HOSPADM

## 2025-03-22 RX ORDER — ACETAMINOPHEN 325 MG/1
650 TABLET ORAL EVERY 6 HOURS PRN
Status: DISCONTINUED | OUTPATIENT
Start: 2025-03-22 | End: 2025-04-09 | Stop reason: HOSPADM

## 2025-03-22 RX ORDER — MORPHINE SULFATE 4 MG/ML
2 INJECTION INTRAVENOUS ONCE
Status: ACTIVE | OUTPATIENT
Start: 2025-03-22 | End: 2025-03-23

## 2025-03-22 RX ORDER — CHLORHEXIDINE GLUCONATE ORAL RINSE 1.2 MG/ML
15 SOLUTION DENTAL 4 TIMES DAILY
Status: DISCONTINUED | OUTPATIENT
Start: 2025-03-22 | End: 2025-04-09 | Stop reason: HOSPADM

## 2025-03-22 RX ORDER — DASATINIB 70 MG/1
70 TABLET, FILM COATED ORAL 2 TIMES DAILY
Status: DISCONTINUED | OUTPATIENT
Start: 2025-03-22 | End: 2025-04-09 | Stop reason: HOSPADM

## 2025-03-22 RX ADMIN — SODIUM CHLORIDE: 9 INJECTION, SOLUTION INTRAVENOUS at 17:47

## 2025-03-22 RX ADMIN — PREDNISONE 60 MG: 50 TABLET ORAL at 20:23

## 2025-03-22 RX ADMIN — METRONIDAZOLE 500 MG: 5 INJECTION, SOLUTION INTRAVENOUS at 17:56

## 2025-03-22 RX ADMIN — CEFEPIME 2 G: 2 INJECTION, POWDER, FOR SOLUTION INTRAVENOUS at 17:48

## 2025-03-22 RX ADMIN — DASATINIB 70 MG: 70 TABLET ORAL at 17:57

## 2025-03-22 RX ADMIN — OXYCODONE HYDROCHLORIDE 5 MG: 5 TABLET ORAL at 22:11

## 2025-03-22 RX ADMIN — HYDROCORTISONE: 1 CREAM TOPICAL at 20:23

## 2025-03-22 RX ADMIN — OXYCODONE HYDROCHLORIDE 5 MG: 5 TABLET ORAL at 17:41

## 2025-03-22 RX ADMIN — CHLORHEXIDINE GLUCONATE, 0.12% ORAL RINSE 15 ML: 1.2 SOLUTION DENTAL at 20:23

## 2025-03-22 RX ADMIN — MORPHINE SULFATE 2 MG: 4 INJECTION, SOLUTION INTRAMUSCULAR; INTRAVENOUS at 23:32

## 2025-03-22 RX ADMIN — SULFAMETHOXAZOLE AND TRIMETHOPRIM 1 TABLET: 800; 160 TABLET ORAL at 20:23

## 2025-03-22 RX ADMIN — CEFEPIME 2 G: 2 INJECTION, POWDER, FOR SOLUTION INTRAVENOUS at 22:14

## 2025-03-22 RX ADMIN — ANTACID TABLETS 1000 MG: 500 TABLET, CHEWABLE ORAL at 22:12

## 2025-03-22 RX ADMIN — SENNOSIDES AND DOCUSATE SODIUM 2 TABLET: 50; 8.6 TABLET ORAL at 17:40

## 2025-03-22 RX ADMIN — CHLORHEXIDINE GLUCONATE, 0.12% ORAL RINSE 15 ML: 1.2 SOLUTION DENTAL at 17:44

## 2025-03-22 ASSESSMENT — PAIN DESCRIPTION - PAIN TYPE
TYPE: ACUTE PAIN

## 2025-03-22 ASSESSMENT — SOCIAL DETERMINANTS OF HEALTH (SDOH)
WITHIN THE PAST 12 MONTHS, YOU WORRIED THAT YOUR FOOD WOULD RUN OUT BEFORE YOU GOT THE MONEY TO BUY MORE: NEVER TRUE
WITHIN THE LAST YEAR, HAVE YOU BEEN HUMILIATED OR EMOTIONALLY ABUSED IN OTHER WAYS BY YOUR PARTNER OR EX-PARTNER?: NO
WITHIN THE LAST YEAR, HAVE YOU BEEN KICKED, HIT, SLAPPED, OR OTHERWISE PHYSICALLY HURT BY YOUR PARTNER OR EX-PARTNER?: NO
IN THE PAST 12 MONTHS, HAS THE ELECTRIC, GAS, OIL, OR WATER COMPANY THREATENED TO SHUT OFF SERVICE IN YOUR HOME?: NO
WITHIN THE PAST 12 MONTHS, THE FOOD YOU BOUGHT JUST DIDN'T LAST AND YOU DIDN'T HAVE MONEY TO GET MORE: NEVER TRUE
WITHIN THE LAST YEAR, HAVE TO BEEN RAPED OR FORCED TO HAVE ANY KIND OF SEXUAL ACTIVITY BY YOUR PARTNER OR EX-PARTNER?: NO
WITHIN THE LAST YEAR, HAVE YOU BEEN AFRAID OF YOUR PARTNER OR EX-PARTNER?: NO

## 2025-03-22 ASSESSMENT — LIFESTYLE VARIABLES
EVER HAD A DRINK FIRST THING IN THE MORNING TO STEADY YOUR NERVES TO GET RID OF A HANGOVER: NO
TOTAL SCORE: 0
HOW MANY TIMES IN THE PAST YEAR HAVE YOU HAD 5 OR MORE DRINKS IN A DAY: 0
DOES PATIENT WANT TO STOP DRINKING: CANNOT ASSESS
CONSUMPTION TOTAL: NEGATIVE
ALCOHOL_USE: NO
TOTAL SCORE: 0
EVER FELT BAD OR GUILTY ABOUT YOUR DRINKING: NO
AVERAGE NUMBER OF DAYS PER WEEK YOU HAVE A DRINK CONTAINING ALCOHOL: 0
TOTAL SCORE: 0
HAVE PEOPLE ANNOYED YOU BY CRITICIZING YOUR DRINKING: NO
ON A TYPICAL DAY WHEN YOU DRINK ALCOHOL HOW MANY DRINKS DO YOU HAVE: 0
HAVE YOU EVER FELT YOU SHOULD CUT DOWN ON YOUR DRINKING: NO

## 2025-03-22 ASSESSMENT — FIBROSIS 4 INDEX: FIB4 SCORE: 6.3

## 2025-03-22 NOTE — PROGRESS NOTES
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 demonstrated a WBC of 0.4, Hgb 8.8 and platelets 125.  ANC at the time was measured at only 170.  JULY was otherwise clinically well appearing.  He did receive a one-time normal saline bolus for his nausea and vomiting and was sent home with instructions to HOLD his oral mercaptopurine and oral methotrexate which began being held on 7/28/2023.  Per protocol, imatinib was continued at previous dose of 600 mg in the morning. Tomas Roy would return to clinic again on 8/2/2023 and 8/7/2023.  On both occasions, ANC remained under 500 and oral therapy (with the exception of imatinib) continue to be held while awaiting count recovery.  Ultimately, on 8/11/2023 after 14 days of therapy being held, Tomas Roy returned to clinic and WBC had increased to 2.1 with ANC increasing to 1500 with an absolute monocyte count of 290. Tomas Roy was then instructed to resume  his oral chemotherapy at 100% of prior dosing with (due to timing with Day 1 of Cycle 2 with LP 3 days later, no weekly MTX was administered).  Imatinib was never held.  On 8/14/2023 he was seen in clinic for Day 1 of Cycle 2 of Maintenance with lumbar puncture, vincristine, and 5-day pulse of steroids.  At the time, his ANC was 2010 and his oral chemotherapy was continued at 100% dosing.  Given his history of previously drop in counts, he was scheduled to come back for repeat labs on 8/29/2023 at which point his WBC count was 1.4 and his ANC remained greater than 500 at 1140.  Again, his therapy was continued with imatinib 550 mg by mouth in the morning as well as 100% dosing of methotrexate and 100% dosing of 6-mercaptopurine.  When Tomas Roy returned to clinic on 9/11/2023 for his Maintenance, Cycle 2, Day 29 therapy he was noted to have had another drop in his WBC to 600 and his ANC accordingly was also decreased at 410.  He did receive vincristine in accordance with protocol as well as starting a 5-day pulse of prednisone per protocol.  Given however that his ANC had dropped below 500 his oral methotrexate and oral 6-MP were again held.  He was instructed return to clinic 1 week later for lab evaluations.  On 9/19/2023 he returned to clinic and WBC had improved slightly to 0.8 however ANC remained low at 260 with an absolute monocyte count of 220.  Given that counts not recovered his oral chemotherapy remained held for an additional week.  On 9/26/2023 at 14 days after initially holding chemotherapy, he was seen back in clinic at which time WBC improved again to 1.1 however ANC did not quite recover and remained at 490 with an absolute monocyte count of 330.  Given that 2 weeks had elapsed with myelosuppression, imatinib was held in addition to oral 6-MP and methotrexate. Tomas Roy was instructed to return to clinic on 9/29/2023 for repeat counts.  On 9/29/2023 WBC had improved to 2.4 and ANC had  finally recovered with 1530 and an absolute monocyte count of 510.  Given a recovery with ANC greater than 750 and platelets greater than 75, oral chemotherapy was restarted at 50% of initial dosing and imatinib was restarted at 100% of initial dosing.  On 10/9/2023, Tomas Roy returned to clinic for his Day 57 of Cycle 2 visit.  At the time of his visit, his ANC had recovered after holding chemotherapy and then restarting at 50% dosing 11 days prior.  He did however in clinic spiked a temperature 102.5 °F.  For this reason despite his ANC being improved, no dose adjustments were made in his chemotherapy.  He continued with 50% dosing with 100% adherence of his 6-MP and methotrexate as well as his imatinib at 550 mg PO QHS.   Tomas Roy was then seen in clinic again on 11/6/2023 for his Day 1 of Cycle 3 of Maintenance therapy.  At the time, his imatinib dose was adjusted back up to 600 mg daily taken in the morning.  Additionally, his methotrexate was adjusted back up to 75% of expected dosing and his mercaptopurine was increased to 75% of expected dosing as well.  He will return to clinic on 12/4/2023 for his Day 29 of Cycle 3 therapy.  At the time, his ANC was exactly 1500 and therefore no dose adjustments were made and he continued at 75% dosing for oral chemotherapy as well as the imatinib dose of 600 mg daily.  On 1/2/2024 he was seen for his Day 57 evaluations and his ANC had dropped to 1450 again not prompting any change in oral chemotherapy dosing.  Tomas Roy completed his Cycle 4 chemotherapy without any adverse events or complications.  He did not have any changes in medications either.  Most recently,  Tomas Roy was seen in clinic on 4/22/2023 for his Day 1 of Cycle 5 chemotherapy.  At the time, a lumbar puncture was performed and IT chemotherapy was provided.  He tolerated the procedure and the therapy well without any sequelae.  His ANC at the time was > 1500 however the dose  "adjustment was made as this was the first ANC greater than 1500 and Tomas Roy had a history of precipitous drops in his counts with increases in his oral chemotherapy.  On 5/20/2024, Tomas Roy presented to clinic for his Cycle 5, Day 29 therapy and evaluations. At that time, he was started on 5 day pulse of prednisone and his oral methotrexate dose was increased to 13 tablets PO weekly (90.78% of expected dosing). His port was removed on 5/20/2024 by Dr. Moise. He completed therapy on 5/30/2024.  Since his completion of therapy and poor removal, he has been seen in follow-up and was most recently seen on 1/15/2025 at which point there was no evidence of relapse or recurrence both clinically or in his labs.  Interval history however is remarkable for upper respiratory symptoms approximately 1-1/2 weeks ago.  At the time, he reported having some nausea and vomiting as well as an upset stomach and sore throat.  The symptoms were thought most likely to be viral and in fact improved consistent with viral illness.  On about 2/24/2025 however symptoms returned and were more flulike in nature with aches and pains and low-grade temperatures.  On the evening of 2/26/2025, JULY called Pediatric Hematology/Oncology to report that he \"feels like I am relapsing\". Tomas Roy was brought in to clinic today for evaluations.  In clinic, CBC, CMP, respiratory viral panel and EBV studies were obtained.  Respiratory viral studies were negative.  CBC however demonstrated a white blood cell count of 1, hemoglobin 10.6, platelets of 53 without any circulating blasts.  His CMP for the most part was normal with mild hyponatremia.  Uric acid was 7.2 and an LDH of 244.  Given these lab values as well as a temperature of 100.6 while in clinic also in the context of an acute infection of his right fourth phalanx, decision was made to bring JULY back to the hospital for admission for fever and neutropenia as well as workup of " presumed relapsed disease.  He was placed on empiric antibiotics and both fever as well as paronychia resolved.  On 2/28/2025 a double-lumen central line was placed and at the same time, lumbar puncture as well as bone marrow aspiration no performed.  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  CNS1 at relapse.  JULY's case was presented to the [facility] at [facility] and the consensus was to re-induce with a 3 Drug regimen including vincristine, PEG-aspargase and prednisone as well as the addition of imatinib.  He tolerated his first 4 days well and was discharged to outpatient for his Day 4 PEG-Aspargase.  JULY did not have any complications of his therapy and returned to clinic 3/14/2025 for his Day 8 vincristine.  He did however need to return for a platelet transfusion 2 days later.  Interval history has been unremarkable and JULY has continued to do well clinically.  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  E459K mutations are typically sensitive to dasatinib and therefore dasatinib was written for.  Today, Tomas Roy returns for his Day 15 vincristine and evaluation.    On presentation, Tomas Roy is clinically well-appearing.  He denies any recent or remote fevers.  No signs or symptoms of acute illness.  He denies any headaches, changes in vision or neurologic status changes.  No complaints of any shortness of breath, difficulty breathing or cough.  Energy meals slightly decreased but no significant fatigue.  No complaints of any easy bruising or bleeding with the exception of blood in stool after significant constipation today.  He has had some mild nausea without vomiting.  No complaints of any significant abdominal pain.  No skin changes or rashes.  No aches or pains.  No other concerns or complaints at this time.    Review of Systems:     Constitutional:   Afebrile. No remote or acute illness.  Energy and activity are decreased.    HENT: Negative for auditory changes, nosebleeds and sore throat.  No mouth sores.  Eyes: Negative for visual changes.  Respiratory: Negative for shortness of breath.  No cough.  Cardiovascular: Negative.  Gastrointestinal: Mild nausea, no vomiting.  Does have constipation.  Some blood in stool with constipation.  Genitourinary: Negative.  Musculoskeletal: Negative for joint or muscle pain.  Skin: Negative for rash, signs of infection.  Neurological: Negative for numbness, tingling, sensory changes, weakness or headaches.    Endo/Heme/Allergies: Does not bruise/bleed easily.    Psychiatric/Behavioral: No changes in mood, appropriate for age.     PAST MEDICAL HISTORY:     Oncologic History:  2-3 week history of worsening fatigue, right lower extremity pain  Presentation to OS and diagnosed with right LE superficial thrombus, subsegmental PE and hyperleukocytosis, anemia and thrombocytopenia  Transferred to [facility] for definitive care  Presenting (local) WBC > 440K, Hgb 10.0, platelets 53, (automated differential ANC 3190, ALC 75,310, absolute monocyte count 59133, absolute blast count 340,560)  Uric Acid 15.6, phosphorus 5.6, LDH 1114  Rasburicase x 1 dose given   Peripheral Blood flow cytometry demonstrating CD10 pos, CD19 pos, CD20 neg, CD22 dim (60%) 5/28/2022  Peripheral blood FISH for BCR-ABL1 positive in 98% of analyzed cells     Age at Diagnosis: 21 years  White Blood Cell Count at Presentation: > 440 k/uL  Testicular Disease Status: Negative (see procedure note 5/30/2022)  CNS Status: 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, diagnostic LP with WBC 1, RBC 3 and no evidence of leukemic blasts 5/30/2022  Steroid Pre-treatment: None  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     All inclusion/exclusion criteria for [id number] met and consent signed - enrolled  5/29/2022   All inclusion/exclusion criteria for [place] [address] met and consent signed - enrolled 5/30/2022  Confirmatory bone marrow aspirate and biopsy and diagnostic LP + cytarabine 70 mg IT 5/30/2022  Induction therapy (ON STUDY [id number]) started 5/30/2022  Bone marrow immunohistochemistry consistent with diagnosis of B-ALL comprising 90% of marrow cellularity  Bone marrow [facility] purposes:  Flow cytom  Of the blood pressure little high that is a problem is a cultural problem is well and cultural genetic and everything else like that unfortunately breathalyzers such bad heart disease diabetes things like that  populations etry consistent with peripheral blood, cytogenetics remarkable for known t(9;22)  CSF with WBC 1, RBC 3, no blasts identified on cytospin  FISH results available 5/31/2022 making patient eligible for transfer from [place] [address] to [place] [address] as eligibility requirements were met for [place] [address]  Patient unenrolled from [place] [address] (BCR-ABL1 fusion positive) 6/1/2022  Consent signed for [place] [address] and patient enrolled 6/1/2022 (see eligibility checklist from 5/31/2022 and consent note from 6/1/2022)  Imatinib 400 mg PO QAM / 200 mg PO QPM started 6/3/2022 (allowed per [place] [address])  Patient completed the first 15 days of a Standard 4-drug Induction on 6/13/2022  Start of [facility](OS), Induction IA Part 2, Day 15 6/13/2022  End of Induction 1A Part 2 - MRD negative  Start of [place] [address](OS), Induction IA Part 2, Day 15 7/5/2022  Induction IB DELAYED 2 weeks 14 days from 7/26/2022-8/9/2022) for myelosuppression - Start of Day 22 cytarabine block 8/9/2022  Induction IB Day 42 delayed 9 days for myelosuppression - Day 42 evaluations 9/7/2022  End of Induction IB - Flow cytometric MRD negative, MRD by IgH-TCR PCR [id number]%  Randomization to AR-Experimental Arm B of [place] [address]  Start of AR-Experimental Arm B, Interim Maintenance 9/29/2022  Weight based increase in dose of imatinib to 400  "mg PO AM and 250 mg PM 9/29/2022  Thrombocytopenia not permissive of proceeding with Day 15 of Interim Maintenance  AR-Experimental Arm B, Interim Maintenance, Day 15 delayed 4 days, start 10/17/2022  AR-Experimental Arm B, Interim Maintenance, Day 29, start 11/1/2022  AR-Experimental Arm B, Interim Maintenance, Day 43, start 11/14/2022  Last does of 6-MP 11/27/2022  AR-Experimental Arm B, Delayed Intensification, Day 1, start 12/6/2022  Admission with Severe Septic Shock 12/27/2022  Imatinib HELD 12/27/2022-1/8/2023  AR-Experimental Arm B, Delayed Intensification, Day 29 DELAYED 14 days with start 1/17/2023  Hospitalization 2/7/2023 on Day 50 of Delayed Intensification with left shoulder pain ultimately diagnosed with Bacteroides fragilis infection  AR-Experimental Arm B, Interim Maintenance, Day 1 DELAYED 7 days with start 2/27/2023  AR-Experimental Arm B, Interim Maintenance, Day 11 DELAYED 4 days for platelets 43K on 3/9/2023  AR-Experimental Arm B, Interim Maintenance, Day 11 VCR given and MTX omitted for platelets 43K 3/13/2023  Imatinib HELD 3/30/2023-4/11/2023  AR-Experimental Arm B, Interim Maintenance, Day 21 (DELAYED 22 DAYS) - administered 4/11/2023 with methotrexate 80 mg/m² IV  AR-Experimental Arm B, Interim Maintenance, Day 31 (\"true\" Day 31 4/25/2023) - VCR and IT MTX given 4/28/2023 - IV MTX omitted  AR-Experimental Arm B, Interim Maintenance, Day 41 met with counts - administered 5/5/2023 with methotrexate 80 mg/m² IV     Start of Maintenance, Cycle 1, Day 1 -5/22/2023  Cranial radiation 6/5/2023-6/16/2023 (10 fractions)  Maintenance, Cycle 1, Day 29 - 6/23/2023 (no IT MTX given)  Maintenance, Cycle 1, Day 67, presented with fatigue nausea and vomiting found to have ANC less than 500  Methotrexate (oral) and mercaptopurine held 7/27/2023 - continued with imatinib  Methotrexate (oral) and mercaptopurine held 8/2/2023 - continued with imatinib  Methotrexate (oral) and mercaptopurine held 8/7/2023 - " continued with imatinib  Restarted methotrexate (oral) and mercaptopurine at 100% previous dosing on 8/11/2023 - continued with imatinib  Maintenance, Cycle 2, Day 1 - 8/14/2023  Maintenance, Cycle 2, Day 29 - 9/11/2023  Methotrexate (oral) and mercaptopurine held 9/11/2023 - continued with imatinib  Methotrexate (oral) and mercaptopurine held 9/19/2023 - continued with imatinib  Methotrexate (oral) and mercaptopurine held 9/26/2023 - HELD imatinib  Methotrexate (oral) restarted at 50% dosing and mercaptopurine restarted at 50% dosing 9/29/2023 - RESTARTED imatinib  Maintenance, Cycle 2, Day 57 - 10/9/2023  Maintenance, Cycle 3, Day 1 - 11/6/2023: Methotrexate (oral) increased to 75% dosing and mercaptopurine increased to 75% dosing.  Imatinib increased to 600 mg QAM 11/6/2023  Maintenance Cycle 3, Day 29: 12/4/2023 No changes made to the dosing of oral chemotherapy  Maintenance, Cycle 4, Day 1: No dose adjustments made however ANC slightly greater than >1500.  Oral chemotherapy did not need weight adjustment.  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    Imatinib discontinued, dasatanib not yet started (PENDING delivery from pharmacy)     Past Medical History:    1) Previously Healthy  2) 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  3) Hyperleukocytosis  4) Hyperuricemia  5) Hyperphosphatemia  6) Right Lower Extremity Superficial Thrombus  7) Subsegmental Pulmonary Embolism  8) 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  9) Bacteroides fragilis soft tissue infection left shoulder  10) Anxiety/Depression     Past Surgical History:     1) Temporary Right IJ Pharesis Catheter Placement 5/28/2022  2) Right-sided Port-A-Cath placement  "8/29/2022  3) IR drainage left calf hematoma  4) Left shoulder I&D  5) Cranial XRT 6/5/2023-6/16/2023     Birth/Developmental History:   1st of three children  Unremarkable pregnancy  Unremarkable delivery     Family History:  No significant family history of cancer.  Both maternal and paternal family history of diabetes.     Immunizations:  UTD     Social History:   Lives with girlfriend.  Graduated from college.  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  Social situation with family is fractured.    Medications:   Current Outpatient Medications on File Prior to Visit   Medication Sig Dispense Refill    dasatinib (SPRYCEL) 70 MG tablet Take 1 Tablet by mouth 2 times a day for 30 days. 60 Tablet 0    predniSONE (DELTASONE) 20 MG Tab Take 3 Tablets by mouth 2 times a day for 25 days. 150 Tablet 0    famotidine (PEPCID) 20 MG Tab Take 1 Tablet by mouth 2 times a day. 60 Tablet 0    sulfamethoxazole-trimethoprim (BACTRIM DS) 800-160 MG tablet Take 1 Tablet by mouth 2 times a day. (Patient taking differently: Take 1 Tablet by mouth 2 times a day. On the weekends only) 16 Tablet 3    mupirocin calcium (BACTROBAN) 2 % Cream Apply 1 Application topically 2 times a day. (Patient taking differently: Apply 1 Application topically 2 times a day. Will finish rx prior to procedure.) 15 g 0     No current facility-administered medications on file prior to visit.     OBJECTIVE:     Vitals:   /58   Pulse (!) 108   Temp 37.1 °C (98.8 °F) (Temporal)   Resp 18   Ht 1.65 m (5' 4.96\")   Wt 72.4 kg (159 lb 9.8 oz)   SpO2 99%     Labs:    Outpatient Infusion Services on 03/21/2025   Component Date Value    WBC 03/21/2025 0.4 (LL)     RBC 03/21/2025 2.96 (L)     Hemoglobin 03/21/2025 8.2 (L)     Hematocrit 03/21/2025 23.6 (L)     MCV 03/21/2025 79.7 (L)     MCH 03/21/2025 27.7     MCHC 03/21/2025 34.7     RDW 03/21/2025 37.0     Platelet Count 03/21/2025 15 (LL)     MPV 03/21/2025 12.2     Neutrophils-Polys 03/21/2025 " 5.20 (L)     Lymphocytes 03/21/2025 93.10 (H)     Monocytes 03/21/2025 1.70     Eosinophils 03/21/2025 0.00     Basophils 03/21/2025 0.00     Nucleated RBC 03/21/2025 4.90 (H)     Neutrophils (Absolute) 03/21/2025 0.02 (LL)     Lymphs (Absolute) 03/21/2025 0.37 (L)     Monos (Absolute) 03/21/2025 0.01     Eos (Absolute) 03/21/2025 0.00     Baso (Absolute) 03/21/2025 0.00     NRBC (Absolute) 03/21/2025 0.02     Anisocytosis 03/21/2025 1+     Microcytosis 03/21/2025 1+     Sodium 03/21/2025 135     Potassium 03/21/2025 4.2     Chloride 03/21/2025 101     Co2 03/21/2025 21     Anion Gap 03/21/2025 13.0     Glucose 03/21/2025 140 (H)     Bun 03/21/2025 20     Creatinine 03/21/2025 0.82     Calcium 03/21/2025 7.5 (L)     Correct Calcium 03/21/2025 8.1 (L)     AST(SGOT) 03/21/2025 65 (H)     ALT(SGPT) 03/21/2025 250 (H)     Alkaline Phosphatase 03/21/2025 102 (H)     Total Bilirubin 03/21/2025 2.0 (H)     Albumin 03/21/2025 3.2     Total Protein 03/21/2025 4.7 (L)     Globulin 03/21/2025 1.5 (L)     A-G Ratio 03/21/2025 2.1     Direct Bilirubin 03/21/2025 0.7 (H)     Indirect Bilirubin 03/21/2025 1.3 (H)     GFR (CKD-EPI) 03/21/2025 126     Manual Diff Status 03/21/2025 PERFORMED     Comment 03/21/2025 See Comment     Peripheral Smear Review 03/21/2025 see below     Plt Estimation 03/21/2025 SEE BELOW     RBC Morphology 03/21/2025 Present     Imm. Plt Fraction 03/21/2025 7.6      Physical Exam:    Constitutional: Well-developed, well-nourished, and in no distress.  Sick but not toxic appearing.  HENT: Normocephalic and atraumatic. No nasal congestion or rhinorrhea. Oropharynx is clear and moist. No oral ulcerations or sores.    Eyes: Conjunctivae are normal. Pupils are equal, round.  EOMI.  Non-icteric.  Neck: Normal range of motion of neck, no adenopathy.    Cardiovascular: Normal rate, regular rhythm and normal heart sounds.  No murmur heard. DP/radial pulses 2+, cap refill < 2 sec.  Pulmonary/Chest: Effort normal and  breath sounds normal. No respiratory distress. Symmetric expansion.  No crackles or wheezes.  Abdomen: Soft. Bowel sounds are normal. No distension and no mass. There is no hepatosplenomegaly.    Genitourinary:  Deferred.  Musculoskeletal: Normal range of motion of lower and upper extremities bilaterally. Neurological: Alert and oriented to person and place. Exhibits normal muscle tone bilaterally in upper and lower extremities. Gait normal. Coordination normal.    Skin: Skin is warm, dry and pink.  No rash or evidence of skin infection.  No pallor.   Psychiatric: Mood and affect normal for age.    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:              - As above in Oncologic History:   - 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              - 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, received cranial radiation 6/5/2023 to 6/16/2023              - Testicular disease negative at diagnosis              - Several complications throughout therapy to include severe septic shock 12/27/2022 while in Delayed Intensification              - Completed therapy 5/30/2024              - Last seen in clinic on 1/15/2025 without any evidence of relapse (clinical/laboratory)              - Reported viral URI symptoms approximately 2.5 weeks prior to presentation               - Interval resolution of viral URI symptoms followed by very acute worsening of flulike symptoms as well as aches and pains              - Seen in clinic 2/27/2025:              - WBC 1000 cells/uL, Hgb 10.6 g/dL, platelets 53,000 cells/uL              - ANC 10, , absolute monocyte count 20                 - Precipitous drop of counts over  the past month with context of low-grade temperatures and bone pain most consistent with relapsed leukemia                 - Admission for Fever and Neutropenia 2/27/2025                 - Double-lumen Broviac line placement, diagnostic bone marrow evaluation to include aspirate and biopsy, flow cytometry and FISH to confirm relapse at bedside 2/28/2025  - Testicular negative at relapse  - CSF negative consistent with CNS1  - Confirmed 13% blasts in hemodilute BMA specimen by flow  - Confirmed BCR-ABL1 fusion in 17% cells by FISH              - Have already reached out to transplant colleagues to begin planning allogeneic transplant / CAR-T                  - After discussing multiple options, will propose to patient a 3-Drug Re-Induction (VCR/PRED/PEG-ASP) + Imatinib followed by blinatumomab     - Met virtually with Dr. Adrian, [place] [facility] 3/20/2025                 Individualized Salvage Therapy, 3-Drug Re-Induction, Day 15:              ** Methotrexate 15 mg IT x 1 on Days 1 (COMPLETE, see separate procedure note)              ** Vincristine 2 mg IV x 1 on Days 1 (COMPLETE), 8 (COMPLETE), 15 (TODAY) and 22              ** Prednisone 30 mg/m2/dose = 60 mg PO BID x 28 days               ** PEG-Aspargase 2000 IU/m2 x 1 on Day 4 in [facility]IC (COMPLETE)               ** Imatinib 400 mg PO QAM and 250 mg PO QHS (started 3/6/2025, given resistence, discontinued)   ** Has not yet started dasatinib 70 mg PO BID    - Return to [facility] Sunday for possible transfusions, RTC 1 week for Day 22 VCR    2) Imatinib Resistance:   - 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 mutation in BCR:ABL1   - Will begin dasatinib    3) Disease Related Pancytopenia:              -  cells/uL, Hgb 8.2 g/dL, platelets 15,000 cells/uL              - ANC 20, , absolute monocyte count 10  BLASTS: 0              - Transfuse irradiated PRBC for Hgb<7 g/dL or symptomatic              - Transfuse irradiated platelets for platelet count of <10,000 cells/uL or symptomatic                  - RTC 3/23/2025 for possible transfusions    4) At Risk for Opportunistic Pulmonary Infection:   - Heavily pretreated   - Bactrim -160 mg PO BID Sat/Sun   - HSV1 + IgG, 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   - Consider levofloxacin for prolonged neutropenia    5) Chemotherapy Induced Nausea and Vomiting:   - Zofran and Ativan PRN at home    6) Constipation:   - Likely therapy related   - Senna-Docusate 1-2 tabs PO daily PRN    7) Hyperglycemia, Steroid Induced:   - Moderate   -  today (no sugars > 200)   - Monitor for need for insulin    8) Transaminitis:   - AST 65,    - Bilirubin total 2.0, direct bilirubin 0.7    - OK to give VCR   - Continue to monitor with worsening liver enzymes    9) Vascular Access:              - Double-lumen CVL placed 2/28/2025      10) Psychosocial:              - Dr. Ortega following    11) Bone Marrow Transplant Candidate:   - Ongoing discussion with [place] [facility]   - Met virtually with Dr. Adrian - will begin search for donor     12) Social:              - Referred  to Social Work and financial navigator     Disposition: RTC 3/23/2025 for possible transfusion.  RTC 1 week for Day 22 VCR    Pepe Faye MD  Pediatric Hematology / Oncology  [facility]  Cell.  [phone]  Office. [phone]    Time Spent: 180 minutes spent today.  30 minutes of face to face were spent with the patient.  An additional 150 minutes were spent working to arrange for dasatinib therapy.

## 2025-03-22 NOTE — ADDENDUM NOTE
Addended by: LADONNA MONAE on: 3/21/2025 09:36 PM     Modules accepted: Orders, Level of Service

## 2025-03-23 ENCOUNTER — APPOINTMENT (OUTPATIENT)
Dept: ONCOLOGY | Facility: MEDICAL CENTER | Age: 25
End: 2025-03-23
Attending: PEDIATRICS
Payer: COMMERCIAL

## 2025-03-23 PROBLEM — R78.81 BACTEREMIA: Status: ACTIVE | Noted: 2025-03-23

## 2025-03-23 LAB
ANION GAP SERPL CALC-SCNC: 10 MMOL/L (ref 7–16)
BACTERIA BLD CULT: ABNORMAL
BACTERIA BLD CULT: ABNORMAL
BASOPHILS # BLD AUTO: 0 % (ref 0–1.8)
BASOPHILS # BLD: 0 K/UL (ref 0–0.12)
BUN SERPL-MCNC: 15 MG/DL (ref 8–22)
CALCIUM SERPL-MCNC: 7 MG/DL (ref 8.5–10.5)
CHLORIDE SERPL-SCNC: 100 MMOL/L (ref 96–112)
CO2 SERPL-SCNC: 21 MMOL/L (ref 20–33)
COMMENT NL1176: NORMAL
CREAT SERPL-MCNC: 0.58 MG/DL (ref 0.5–1.4)
EOSINOPHIL # BLD AUTO: 0 K/UL (ref 0–0.51)
EOSINOPHIL NFR BLD: 0 % (ref 0–6.9)
ERYTHROCYTE [DISTWIDTH] IN BLOOD BY AUTOMATED COUNT: 37.2 FL (ref 35.9–50)
GFR SERPLBLD CREATININE-BSD FMLA CKD-EPI: 140 ML/MIN/1.73 M 2
GLUCOSE SERPL-MCNC: 111 MG/DL (ref 65–99)
HCT VFR BLD AUTO: 22.4 % (ref 42–52)
HGB BLD-MCNC: 8 G/DL (ref 14–18)
LYMPHOCYTES # BLD AUTO: 0.51 K/UL (ref 1–4.8)
LYMPHOCYTES NFR BLD: 85.7 % (ref 22–41)
MANUAL DIFF BLD: NORMAL
MCH RBC QN AUTO: 28.7 PG (ref 27–33)
MCHC RBC AUTO-ENTMCNC: 35.7 G/DL (ref 32.3–36.5)
MCV RBC AUTO: 80.3 FL (ref 81.4–97.8)
MONOCYTES # BLD AUTO: 0 K/UL (ref 0–0.85)
MONOCYTES NFR BLD AUTO: 0 % (ref 0–13.4)
MORPHOLOGY BLD-IMP: NORMAL
NEUTROPHILS # BLD AUTO: 0.09 K/UL (ref 1.82–7.42)
NEUTROPHILS NFR BLD: 14.3 % (ref 44–72)
NRBC # BLD AUTO: 0 K/UL
NRBC BLD-RTO: 0 /100 WBC (ref 0–0.2)
PLATELET # BLD AUTO: 12 K/UL (ref 164–446)
PLATELET BLD QL SMEAR: NORMAL
PLATELETS.RETICULATED NFR BLD AUTO: 1.6 % (ref 0.6–13.1)
PMV BLD AUTO: 8.6 FL (ref 9–12.9)
POIKILOCYTOSIS BLD QL SMEAR: NORMAL
POTASSIUM SERPL-SCNC: 4.3 MMOL/L (ref 3.6–5.5)
RBC # BLD AUTO: 2.79 M/UL (ref 4.7–6.1)
RBC BLD AUTO: PRESENT
SCCMEC + MECA PNL NOSE NAA+PROBE: NEGATIVE
SIGNIFICANT IND 70042: ABNORMAL
SITE SITE: ABNORMAL
SMUDGE CELLS BLD QL SMEAR: NORMAL
SODIUM SERPL-SCNC: 131 MMOL/L (ref 135–145)
SOURCE SOURCE: ABNORMAL
STOMATOCYTES BLD QL SMEAR: NORMAL
TARGETS BLD QL SMEAR: NORMAL
WBC # BLD AUTO: 0.6 K/UL (ref 4.8–10.8)

## 2025-03-23 PROCEDURE — 85027 COMPLETE CBC AUTOMATED: CPT

## 2025-03-23 PROCEDURE — 700102 HCHG RX REV CODE 250 W/ 637 OVERRIDE(OP): Performed by: PEDIATRICS

## 2025-03-23 PROCEDURE — 770004 HCHG ROOM/CARE - ONCOLOGY PRIVATE *

## 2025-03-23 PROCEDURE — 700105 HCHG RX REV CODE 258: Performed by: PEDIATRICS

## 2025-03-23 PROCEDURE — 86923 COMPATIBILITY TEST ELECTRIC: CPT

## 2025-03-23 PROCEDURE — 87641 MR-STAPH DNA AMP PROBE: CPT

## 2025-03-23 PROCEDURE — 85055 RETICULATED PLATELET ASSAY: CPT

## 2025-03-23 PROCEDURE — 99233 SBSQ HOSP IP/OBS HIGH 50: CPT | Performed by: PEDIATRICS

## 2025-03-23 PROCEDURE — 36430 TRANSFUSION BLD/BLD COMPNT: CPT

## 2025-03-23 PROCEDURE — A9270 NON-COVERED ITEM OR SERVICE: HCPCS | Performed by: PEDIATRICS

## 2025-03-23 PROCEDURE — 700111 HCHG RX REV CODE 636 W/ 250 OVERRIDE (IP): Performed by: PEDIATRICS

## 2025-03-23 PROCEDURE — 85007 BL SMEAR W/DIFF WBC COUNT: CPT

## 2025-03-23 PROCEDURE — 80048 BASIC METABOLIC PNL TOTAL CA: CPT

## 2025-03-23 PROCEDURE — P9016 RBC LEUKOCYTES REDUCED: HCPCS

## 2025-03-23 PROCEDURE — 86945 BLOOD PRODUCT/IRRADIATION: CPT

## 2025-03-23 RX ADMIN — Medication 1 APPLICATOR: at 18:29

## 2025-03-23 RX ADMIN — VANCOMYCIN HYDROCHLORIDE 1250 MG: 5 INJECTION, POWDER, LYOPHILIZED, FOR SOLUTION INTRAVENOUS at 19:37

## 2025-03-23 RX ADMIN — SENNOSIDES AND DOCUSATE SODIUM 2 TABLET: 50; 8.6 TABLET ORAL at 18:29

## 2025-03-23 RX ADMIN — ANTACID TABLETS 1000 MG: 500 TABLET, CHEWABLE ORAL at 09:45

## 2025-03-23 RX ADMIN — METRONIDAZOLE 500 MG: 5 INJECTION, SOLUTION INTRAVENOUS at 18:30

## 2025-03-23 RX ADMIN — SULFAMETHOXAZOLE AND TRIMETHOPRIM 1 TABLET: 800; 160 TABLET ORAL at 23:30

## 2025-03-23 RX ADMIN — Medication 1 APPLICATOR: at 06:00

## 2025-03-23 RX ADMIN — DASATINIB 70 MG: 70 TABLET ORAL at 18:27

## 2025-03-23 RX ADMIN — CEFEPIME 2 G: 2 INJECTION, POWDER, FOR SOLUTION INTRAVENOUS at 13:57

## 2025-03-23 RX ADMIN — CHLORHEXIDINE GLUCONATE, 0.12% ORAL RINSE 15 ML: 1.2 SOLUTION DENTAL at 23:30

## 2025-03-23 RX ADMIN — ANTACID TABLETS 1000 MG: 500 TABLET, CHEWABLE ORAL at 23:30

## 2025-03-23 RX ADMIN — OXYCODONE HYDROCHLORIDE 5 MG: 5 TABLET ORAL at 13:12

## 2025-03-23 RX ADMIN — CEFEPIME 2 G: 2 INJECTION, POWDER, FOR SOLUTION INTRAVENOUS at 04:21

## 2025-03-23 RX ADMIN — SODIUM CHLORIDE: 9 INJECTION, SOLUTION INTRAVENOUS at 15:48

## 2025-03-23 RX ADMIN — VANCOMYCIN HYDROCHLORIDE 1750 MG: 5 INJECTION, POWDER, LYOPHILIZED, FOR SOLUTION INTRAVENOUS at 09:45

## 2025-03-23 RX ADMIN — PREDNISONE 60 MG: 50 TABLET ORAL at 09:45

## 2025-03-23 RX ADMIN — DASATINIB 70 MG: 70 TABLET ORAL at 04:24

## 2025-03-23 RX ADMIN — OXYCODONE HYDROCHLORIDE 10 MG: 5 TABLET ORAL at 19:54

## 2025-03-23 RX ADMIN — ONDANSETRON 8 MG: 2 INJECTION INTRAMUSCULAR; INTRAVENOUS at 07:57

## 2025-03-23 RX ADMIN — METRONIDAZOLE 500 MG: 5 INJECTION, SOLUTION INTRAVENOUS at 04:18

## 2025-03-23 RX ADMIN — PREDNISONE 60 MG: 50 TABLET ORAL at 23:30

## 2025-03-23 RX ADMIN — SULFAMETHOXAZOLE AND TRIMETHOPRIM 1 TABLET: 800; 160 TABLET ORAL at 09:45

## 2025-03-23 RX ADMIN — CHLORHEXIDINE GLUCONATE, 0.12% ORAL RINSE 15 ML: 1.2 SOLUTION DENTAL at 13:11

## 2025-03-23 RX ADMIN — LORAZEPAM 1 MG: 2 INJECTION INTRAMUSCULAR; INTRAVENOUS at 09:53

## 2025-03-23 RX ADMIN — SENNOSIDES AND DOCUSATE SODIUM 2 TABLET: 50; 8.6 TABLET ORAL at 04:18

## 2025-03-23 RX ADMIN — CHLORHEXIDINE GLUCONATE, 0.12% ORAL RINSE 15 ML: 1.2 SOLUTION DENTAL at 18:28

## 2025-03-23 ASSESSMENT — PAIN DESCRIPTION - PAIN TYPE
TYPE: ACUTE PAIN

## 2025-03-23 NOTE — DISCHARGE PLANNING
Care Transition Team Assessment    Patient is a 24 year old male admitted for ALL. Please see patient's H&P for prior medical history. LMSW met with patient at bedside to complete assessment. Patient is A&OX4 and able to verify information on the face sheet. Patient currently lives with his girlfriend in a single story house with no steps to enter, [place] ([phone]; emergency contact. No Advanced directive on file. Patient reports, prior to admission patient is independent with ADL's and IADL's. Patient does not use any DME at baseline. Patient reports his girlfriend is good support for him. Patient currently works full time. Patient's PCP is Margarito Arvizu. Patient's preferred pharmacy is [facility]. Patient denies a history of SNF/IPR nor HHC use in the past. Patient denies any SA or MH concerns. Patient confirmed medical coverage via [employer] and Medicare. Patient has means to transportation and his girlfriend will provide transport once medically stable for discharge. Patient is being followed in Peds Oncology.     Information Source  Orientation Level: Oriented X4  Information Given By: Patient  Who is responsible for making decisions for patient? : Patient    Readmission Evaluation  Is this a readmission?: Yes - unplanned readmission    Elopement Risk  Legal Hold: No  Ambulatory or Self Mobile in Wheelchair: Yes  Disoriented: No  Psychiatric Symptoms: None  History of Wandering: No  Elopement this Admit: No  Vocalizing Wanting to Leave: No  Displays Behaviors, Body Language Wanting to Leave: No-Not at Risk for Elopement  Elopement Risk: Not at Risk for Elopement    Interdisciplinary Discharge Planning  Lives with - Patient's Self Care Capacity: Significant Other  Patient or legal guardian wants to designate a caregiver: No  Support Systems: Family Member(s), Friends / Neighbors, Spouse / Significant Other  Housing / Facility: 1 Story House    Discharge Preparedness  What is your plan  after discharge?: Home with help  What are your discharge supports?: Parent  Prior Functional Level: Ambulatory  Difficulity with ADLs: None  Difficulity with IADLs: None    Functional Assesment  Prior Functional Level: Ambulatory    Finances  Financial Barriers to Discharge: No  Prescription Coverage: Yes    Vision / Hearing Impairment  Vision Impairment : Yes  Right Eye Vision: Impaired, Wears Glasses  Left Eye Vision: Impaired, Wears Glasses  Hearing Impairment : No         Advance Directive  Advance Directive?: None    Domestic Abuse  Possible Abuse/Neglect Reported to:: Not Applicable    Psychological Assessment  History of Substance Abuse: None  History of Psychiatric Problems: No  Non-compliant with Treatment: No  Newly Diagnosed Illness: No    Discharge Risks or Barriers  Discharge risks or barriers?: Complex medical needs  Patient risk factors: Complex medical needs    Anticipated Discharge Information  Discharge Disposition: Discharged to home/self care (01)

## 2025-03-23 NOTE — PROGRESS NOTES
Patient's WBC's 0.3, hgb 7.4, platelets 5, ANC 0.04. Dr. Duenas notified at [phone]. Provider ordered 1 unit plt transfusion and 1 unit RBC transfusion.

## 2025-03-23 NOTE — PROGRESS NOTES
Pharmacy Vancomycin Kinetics Note for 3/23/2025     24 y.o. male on Vancomycin day # 1     Vancomycin Indication (AUC Dosing):  (line infection)    Provider specified end date: 03/25/25    Active Antibiotics (From admission, onward)      Ordered     Ordering Provider       Sun Mar 23, 2025  8:57 AM    03/23/25 0857  vancomycin (Vancocin) 1,250 mg in  mL IVPB  (vancomycin (VANCOCIN) IV (LD + Maintenance))  EVERY 8 HOURS         Pepe Faye M.D.       [place] Mar 23, 2025  8:48 AM    03/23/25 0848  vancomycin (Vancocin) 1,750 mg in  mL IVPB  (vancomycin (VANCOCIN) IV (LD + Maintenance))  ONCE         Pepe Faye M.D.       [place] Mar 23, 2025  8:46 AM    03/23/25 0846  MD Alert...Vancomycin per Pharmacy  (MD Alert...Vancomycin per Pharmacy)  PHARMACY TO DOSE        Question Answer Comment   Indication(s) for vancomycin? Line infection    Indication(s) for vancomycin? Unknown source of infection    Indication(s) for vancomycin? Other (comments)        Pepe Faye M.D.       Sat Mar 22, 2025  4:57 PM    03/22/25 1657  sulfamethoxazole-trimethoprim (Bactrim DS) 800-160 MG tablet 1 Tablet  2 times per day on Sunday Saturday         Alyce Duenas M.D.       Sat Mar 22, 2025  3:36 PM    03/22/25 1536  cefepime (Maxipime) 2 g in  mL IVPB  EVERY 8 HOURS        Note to Pharmacy: First Dose STAT please    Alyce Duenas M.D.    03/22/25 1536  metroNIDAZOLE (Flagyl) IVPB 500 mg  EVERY 12 HOURS        Note to Pharmacy: First dose STAT please    Alyce Duenas M.D.            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 Cefepime and Flagyl initiated as pt neutropenic. Pt on maintenance dasatinib, last chemo received 3/6/25. Vancomycin added on with concerns for line infection.    Allergies:     Amoxicillin     Pertinent cultures to  "date:     Results       Procedure Component Value Units Date/Time    MRSA By PCR (Amp) [[id number]]     Order Status: No result Specimen: Respirate from Nares     Blood Culture [[id number]]  (Abnormal) Collected: 25    Order Status: Completed Specimen: Blood from Line Updated: 25     Significant Indicator POS     Source BLD     Site LINE     Culture Result Growth detected by automated blood culture system. 08:23  Gram Stain: Gram positive cocci in clusters.      Blood Culture [[id number]]  (Abnormal) Collected: 25    Order Status: Completed Specimen: Blood from Line Updated: 25 08     Significant Indicator POS     Source BLD     Site LINE     Culture Result Growth detected by automated blood culture system. 08:22  Gram Stain: Gram positive cocci in clusters.      Blood Culture [[id number]]     Order Status: Canceled Specimen: Blood from Line     Blood Culture [[id number]]     Order Status: Canceled Specimen: Blood from Peripheral             Labs:     Estimated Creatinine Clearance: 163.8 mL/min (by C-G formula based on SCr of 0.61 mg/dL).  Recent Labs     25  0810 25   WBC 0.4* 0.3*   NEUTSPOLYS 5.20* 13.30*     Recent Labs     25  0810 25   BUN 20 18   CREATININE 0.82 0.61   ALBUMIN 3.2 2.8*       Intake/Output Summary (Last 24 hours) at 3/23/2025 0936  Last data filed at 3/23/2025 0800  Gross per 24 hour   Intake 491.33 ml   Output 200 ml   Net 291.33 ml      /65   Pulse 65   Temp 36.8 °C (98.3 °F) (Oral)   Resp (!) 22   Ht 1.651 m (5' 5\")   Wt 72.3 kg (159 lb 6.3 oz)   SpO2 100%  Temp (24hrs), Av.8 °C (98.2 °F), Min:36.5 °C (97.7 °F), Max:36.9 °C (98.4 °F)      List concerns for Vancomycin clearance:     Abnormal LFTs    Pharmacokinetics:    AUC kinetics:   Ke (hr ^-1): 0.1405 hr^-1  Half life: 4.93 hr  Clearance: 6.603  Estimated TDD: 3301.5  Estimated Dose: 949  Estimated interval: 6.9    A/P:     -  Vancomycin dose: 1250 " mg q8h (0200, 1000, 1800)    -  Next vancomycin level(s): will order once loading dose given   -3/24  @ 1300   -3/24 Vt @ 1730     -  Predicted vancomycin AUC from initial AUC test calculator: 568 mg·hr/L    -  Comments: Vancomycin added to Cefepime/Flagyl d/t concerns for line infection (preliminary blood culture from line showing GPCs in clusters) in neutropenic pt with relapsed leukemia. Dosing aggressively given pt's neutropenic status, age, and renal function. MRSA PCR pending. Pharmacy will continue to follow.    Willi Hall, PharmD

## 2025-03-23 NOTE — PROGRESS NOTES
4 Eyes Skin Assessment Completed by Ofelia SUTHERLAND RN and Virginia RN.    Head WDL  Ears WDL  Nose WDL  Mouth WDL  Neck WDL  Breast/Chest Scar  Shoulder Blades WDL  Spine WDL  (R) Arm/Elbow/Hand Scar  (L) Arm/Elbow/Hand Scar  Abdomen WDL  Groin WDL  Scrotum/Coccyx/Buttocks WDL  (R) Leg WDL  (L) Leg WDL  (R) Heel/Foot/Toe WDL  (L) Heel/Foot/Toe WDL          Devices In Places Central Line      Interventions In Place Pillows and Pressure Redistribution Mattress    Possible Skin Injury No    Pictures Uploaded Into Epic N/A  Wound Consult Placed N/A  RN Wound Prevention Protocol Ordered No

## 2025-03-23 NOTE — CARE PLAN
The patient is Watcher - Medium risk of patient condition declining or worsening    Shift Goals  Clinical Goals: Monitor labs, pain control, MRI  Patient Goals: MRI, pain control  Family Goals: NA    Patient was medicated for pain per MAR. Neutropenic precautions in place. Pt received 1 unit of platelets and 1 unit RBC's tonight. Pt calls appropriately, bed is in lowest and locked position, personal belongings and call light are within reach.     Progress made toward(s) clinical / shift goals:    Problem: Pain - Standard  Goal: Alleviation of pain or a reduction in pain to the patient’s comfort goal    Outcome: Progressing       Patient is not progressing towards the following goals:

## 2025-03-23 NOTE — H&P
Pediatric Hematology/Oncology   H and P      Patient Name:  Tomas Jean-Baptiste  : 2000   MRN: [id number]    Location of Service: [facility]  Date of Service: 3/22/2025  Time: 5:53 PM    Primary Care Physician: Margarito Arvizu M.D.    Diagnosis/Treatment Plan: Relapsed Leukemia, Individualized 3-Drug Re-Induction, Day 16    HISTORY OF PRESENT ILLNESS:     Chief Complaint: Rectal pain , blood in stool    History of Present Illness: Tomas Jean-Baptiste is a 24 y.o. 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 He presents himself and provides accurate interval clinical history.     JULY was doing well until 3 days ago when he started having rectal pain with defecation. He reports passing hard stool at that time. He also reports noticing blood in stool at that time. He took OTC stool softener which has helped to have soft stool but he continues to have rectal pain. Initially the pain was only with defecation but now it is also at rest. He denies noticing any more blood in stool. Denies blood with wiping. No abdominal pain/distension. Emesis x 1 today morning. Currently, denies any nausea or emesis. Able to eat and drink well. Voiding OK. No fever, cough, congestion or difficulty breathing. No new neurologic changes. No rash. No bruising or bleeding symptoms.       Review of Systems:     Constitutional: Afebrile.  HENT: Negative for auditory changes, nosebleeds and sore throat.  No mouth sores.  Eyes: Negative for visual changes.  Respiratory: Negative for  shortness of breath and stridor.   Cardiovascular: Negative for chest pain and leg swelling.    Gastrointestinal: Positive for emesis x 1, constipation, blood in stool and rectal pain. Negative for nausea, abdominal pain.  Genitourinary:  Negative for dysuria and flank pain.    Musculoskeletal: Negative  Skin: Negative for rash, signs of infection.  Neurological: Negative for numbness, tingling, sensory changes, weakness or headaches.    Endo/Heme/Allergies: Does not bruise/bleed easily.    Psychiatric/Behavioral: No changes in mood, appropriate for age.     PAST MEDICAL HISTORY:     Oncologic History:  2-3 week history of worsening fatigue, right lower extremity pain  Presentation to OS and diagnosed with right LE superficial thrombus, subsegmental PE and hyperleukocytosis, anemia and thrombocytopenia  Transferred to [facility] for definitive care  Presenting (local) WBC > 440K, Hgb 10.0, platelets 53, (automated differential ANC 3190, ALC 75,310, absolute monocyte count 55431, absolute blast count 340,560)  Uric Acid 15.6, phosphorus 5.6, LDH 1114  Rasburicase x 1 dose given   Peripheral Blood flow cytometry demonstrating CD10 pos, CD19 pos, CD20 neg, CD22 dim (60%) 5/28/2022  Peripheral blood FISH for BCR-ABL1 positive in 98% of analyzed cells     Age at Diagnosis: 21 years  White Blood Cell Count at Presentation: > 440 k/uL  Testicular Disease Status: Negative (see procedure note 5/30/2022)  CNS Status: 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, diagnostic LP with WBC 1, RBC 3 and no evidence of leukemic blasts 5/30/2022  Steroid Pre-treatment: None  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     All inclusion/exclusion criteria for [id number] met and consent signed - enrolled 5/29/2022   All inclusion/exclusion criteria for [id number] met and consent signed - enrolled 5/30/2022  Confirmatory bone marrow aspirate and biopsy and diagnostic LP + cytarabine 70 mg IT 5/30/2022  Induction therapy (ON STUDY [id number]) started 5/30/2022  Bone marrow immunohistochemistry consistent with diagnosis of B-ALL comprising 90% of marrow cellularity  Bone marrow sample sent to [facility] for COG  purposes:  Flow cytom  Of the blood pressure little high that is a problem is a cultural problem is well and cultural genetic and everything else like that unfortunately breathalyzers such bad heart disease diabetes things like that  populations etry consistent with peripheral blood, cytogenetics remarkable for known t(9;22)  CSF with WBC 1, RBC 3, no blasts identified on cytospin  FISH results available 5/31/2022 making patient eligible for transfer from [place] [address] to [place] [address] as eligibility requirements were met for [place] [address]  Patient unenrolled from [place] [address] (BCR-ABL1 fusion positive) 6/1/2022  Consent signed for [place] [address] and patient enrolled 6/1/2022 (see eligibility checklist from 5/31/2022 and consent note from 6/1/2022)  Imatinib 400 mg PO QAM / 200 mg PO QPM started 6/3/2022 (allowed per [place] [address])  Patient completed the first 15 days of a Standard 4-drug Induction on 6/13/2022  Start of [place] [address](OS), Induction IA Part 2, Day 15 6/13/2022  End of Induction 1A Part 2 - MRD negative  Start of [place] [address](OS), Induction IA Part 2, Day 15 7/5/2022  Induction IB DELAYED 2 weeks 14 days from 7/26/2022-8/9/2022) for myelosuppression - Start of Day 22 cytarabine block 8/9/2022  Induction IB Day 42 delayed 9 days for myelosuppression - Day 42 evaluations 9/7/2022  End of Induction IB - Flow cytometric MRD negative, MRD by IgH-TCR PCR [id number]%  Randomization to AR-Experimental Arm B of [place] [address]  Start of AR-Experimental Arm B, Interim Maintenance 9/29/2022  Weight based increase in dose of imatinib to 400 mg PO AM and 250 mg PM 9/29/2022  Thrombocytopenia not permissive of proceeding with Day 15 of Interim Maintenance  AR-Experimental Arm B, Interim Maintenance, Day 15 delayed 4 days, start 10/17/2022  AR-Experimental Arm B, Interim Maintenance, Day 29, start 11/1/2022  AR-Experimental Arm B, Interim Maintenance, Day 43, start 11/14/2022  Last does of 6-MP 11/27/2022  AR-Experimental Arm B, Delayed  "Intensification, Day 1, start 12/6/2022  Admission with Severe Septic Shock 12/27/2022  Imatinib HELD 12/27/2022-1/8/2023  AR-Experimental Arm B, Delayed Intensification, Day 29 DELAYED 14 days with start 1/17/2023  Hospitalization 2/7/2023 on Day 50 of Delayed Intensification with left shoulder pain ultimately diagnosed with Bacteroides fragilis infection  AR-Experimental Arm B, Interim Maintenance, Day 1 DELAYED 7 days with start 2/27/2023  AR-Experimental Arm B, Interim Maintenance, Day 11 DELAYED 4 days for platelets 43K on 3/9/2023  AR-Experimental Arm B, Interim Maintenance, Day 11 VCR given and MTX omitted for platelets 43K 3/13/2023  Imatinib HELD 3/30/2023-4/11/2023  AR-Experimental Arm B, Interim Maintenance, Day 21 (DELAYED 22 DAYS) - administered 4/11/2023 with methotrexate 80 mg/m² IV  AR-Experimental Arm B, Interim Maintenance, Day 31 (\"true\" Day 31 4/25/2023) - VCR and IT MTX given 4/28/2023 - IV MTX omitted  AR-Experimental Arm B, Interim Maintenance, Day 41 met with counts - administered 5/5/2023 with methotrexate 80 mg/m² IV     Start of Maintenance, Cycle 1, Day 1 -5/22/2023  Cranial radiation 6/5/2023-6/16/2023 (10 fractions)  Maintenance, Cycle 1, Day 29 - 6/23/2023 (no IT MTX given)  Maintenance, Cycle 1, Day 67, presented with fatigue nausea and vomiting found to have ANC less than 500  Methotrexate (oral) and mercaptopurine held 7/27/2023 - continued with imatinib  Methotrexate (oral) and mercaptopurine held 8/2/2023 - continued with imatinib  Methotrexate (oral) and mercaptopurine held 8/7/2023 - continued with imatinib  Restarted methotrexate (oral) and mercaptopurine at 100% previous dosing on 8/11/2023 - continued with imatinib  Maintenance, Cycle 2, Day 1 - 8/14/2023  Maintenance, Cycle 2, Day 29 - 9/11/2023  Methotrexate (oral) and mercaptopurine held 9/11/2023 - continued with imatinib  Methotrexate (oral) and mercaptopurine held 9/19/2023 - continued with imatinib  Methotrexate (oral) " and mercaptopurine held 9/26/2023 - HELD imatinib  Methotrexate (oral) restarted at 50% dosing and mercaptopurine restarted at 50% dosing 9/29/2023 - RESTARTED imatinib  Maintenance, Cycle 2, Day 57 - 10/9/2023  Maintenance, Cycle 3, Day 1 - 11/6/2023: Methotrexate (oral) increased to 75% dosing and mercaptopurine increased to 75% dosing.  Imatinib increased to 600 mg QAM 11/6/2023  Maintenance Cycle 3, Day 29: 12/4/2023 No changes made to the dosing of oral chemotherapy  Maintenance, Cycle 4, Day 1: No dose adjustments made however ANC slightly greater than >1500.  Oral chemotherapy did not need weight adjustment.  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     Imatinib discontinued, dasatanib not yet started (PENDING delivery from pharmacy)     Past Medical History:    1) Previously Healthy  2) 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  3) Hyperleukocytosis  4) Hyperuricemia  5) Hyperphosphatemia  6) Right Lower Extremity Superficial Thrombus  7) Subsegmental Pulmonary Embolism  8) 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  9) Bacteroides fragilis soft tissue infection left shoulder  10) Anxiety/Depression     Past Surgical History:     1) Temporary Right IJ Pharesis Catheter Placement 5/28/2022  2) Right-sided Port-A-Cath placement 8/29/2022  3) IR drainage left calf hematoma  4) Left shoulder I&D  5) Cranial XRT 6/5/2023-6/16/2023     Birth/Developmental History:   1st of three children  Unremarkable pregnancy  Unremarkable delivery     Family History:  No significant family history of cancer.  Both maternal and paternal family history of diabetes.     Immunizations:  UTD     Social History:   Lives with girlfriend.  Graduated  "from [employer].  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  Social situation with family is fractured.    Allergies:   Allergies as of 03/22/2025 - Reviewed 03/21/2025   Allergen Reaction Noted    Amoxicillin Rash 04/03/2020           Medications:     Current Outpatient Medications on File Prior to Encounter   Medication Sig Dispense Refill    dasatinib (SPRYCEL) 70 MG tablet Take 1 Tablet by mouth 2 times a day for 30 days. 60 Tablet 0    predniSONE (DELTASONE) 20 MG Tab Take 3 Tablets by mouth 2 times a day for 25 days. 150 Tablet 0    famotidine (PEPCID) 20 MG Tab Take 1 Tablet by mouth 2 times a day. 60 Tablet 0    sulfamethoxazole-trimethoprim (BACTRIM DS) 800-160 MG tablet Take 1 Tablet by mouth 2 times a day. (Patient taking differently: Take 1 Tablet by mouth 2 times a day. On the weekends only) 16 Tablet 3    senna-docusate (SENNA-S) 8.6-50 MG Tab Take 1 Tablet by mouth every day. 30 Tablet 0    ondansetron (ZOFRAN ODT) 4 MG TABLET DISPERSIBLE Take 2 Tablets by mouth every 6 hours as needed for Nausea/Vomiting. 20 Tablet 3    mupirocin calcium (BACTROBAN) 2 % Cream Apply 1 Application topically 2 times a day. (Patient taking differently: Apply 1 Application topically 2 times a day. Will finish rx prior to procedure.) 15 g 0         OBJECTIVE:     Vitals:   /74   Pulse 85   Temp 36.9 °C (98.4 °F) (Oral)   Resp 20   Ht 1.651 m (5' 5\")   Wt 72.3 kg (159 lb 6.3 oz)   SpO2 96%     Labs:     Latest Reference Range & Units 03/22/25 17:15   Sodium 135 - 145 mmol/L 134 (L)   Potassium 3.6 - 5.5 mmol/L 4.2   Chloride 96 - 112 mmol/L 103   Co2 20 - 33 mmol/L 20   Anion Gap 7.0 - 16.0  11.0   Glucose 65 - 99 mg/dL 125 (H)   Bun 8 - 22 mg/dL 18   Creatinine 0.50 - 1.40 mg/dL 0.61   GFR (CKD-EPI) >60 mL/min/1.73 m 2 138   Calcium 8.5 - 10.5 mg/dL 7.2 (L)   Correct Calcium 8.5 - 10.5 mg/dL 8.2 (L)   AST(SGOT) 12 - 45 U/L 73 (H)   ALT(SGPT) 2 - 50 U/L 252 (H)   Alkaline Phosphatase 30 - 99 U/L 106 (H) "   Total Bilirubin 0.1 - 1.5 mg/dL 1.6 (H)   Albumin 3.2 - 4.9 g/dL 2.8 (L)   Total Protein 6.0 - 8.2 g/dL 4.2 (L)   Globulin 1.9 - 3.5 g/dL 1.4 (L)   A-G Ratio g/dL 2.0   Phosphorus 2.5 - 4.5 mg/dL 3.0   Magnesium 1.5 - 2.5 mg/dL 2.2       Blood culture both lumens from CVC : 3/22/2025: PENDING    Physical Exam:    Constitutional: Well-developed, well-nourished, and in no distress.  Sick but not toxic appearing.  HENT: Normocephalic and atraumatic. No nasal congestion or rhinorrhea. Oropharynx is clear and moist. No oral ulcerations or sores.    Eyes: Conjunctivae are normal. Pupils are equal, round.  EOMI.  Non-icteric.  Neck: Normal range of motion of neck, no adenopathy.    Cardiovascular: Normal rate, regular rhythm and normal heart sounds.  No murmur heard. DP/radial pulses 2+, cap refill < 2 sec.  Pulmonary/Chest: Effort normal and breath sounds normal. No respiratory distress. Symmetric expansion.  No crackles or wheezes. DL CVC from L chest wall, dressing C/D/I  Abdomen: Soft. Bowel sounds are normal. No distension and no mass. There is no hepatosplenomegaly.   Genitourinary:  Just  the buttocks caused him to have pain. No external hemorrhoids or skin tag or discharge noted. No perianal induration or erythema appreciated. Not able to visualize anus fully to check for fissure due to pain.  Musculoskeletal: Normal range of motion of lower and upper extremities bilaterally.   Neurological: Alert and oriented to person and place. Exhibits normal muscle tone bilaterally in upper and lower extremities. Gait normal. Coordination normal.    Skin: Skin is warm, dry and pink.  No rash or evidence of skin infection.  No pallor.   Psychiatric: Mood and affect normal for age.    ASSESSMENT AND PLAN:     Tomas Jean-Baptiste is a 24 y.o. 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     Constipation/Rectal Pain/Pain with defecation/Hematochezia  - Concern for internal hemorrhoids vs abscess/fistula  - Will obtain blood culture from both lumens of CVC 3/22/2025: PENDING  - Cefepime 2 mg IV every 8 hrs, First dose STAT  - Flagyl 500 mg IV every 12 hrs , First dose STAT  - Morphine 2 mg IV x 1   - Tylenol every 6 hrs PRN for mild pain  - Morphine 2 mg IV every 3 hrs PRN for moderate-severe pain  - Oxycodone 5-10 mg PO every 4 hrs PRN for moderate-severe pain  - Senna 2 tablets BID, titrate to effect  - Preparation H, apply externally   - Sitz bath PRN  - MRI Pelvis today (patient neutropenic but still will get images)  - Will continue to monitor    Relapsed of Ph+ B-Acute Lymphoblastic Leukemia:  - As above in Oncologic History  - 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  - 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, received cranial radiation 6/5/2023 to 6/16/2023  - Testicular disease negative at diagnosis  - Several complications throughout therapy to include severe septic shock 12/27/2022 while in Delayed Intensification  - Completed therapy 5/30/2024  - Last seen in clinic on 1/15/2025 without any evidence of relapse (clinical/laboratory)  - Reported viral URI symptoms approximately 2.5 weeks prior to presentation   - Interval resolution of viral URI symptoms followed by very acute worsening of flulike symptoms as well as aches and pains  - Seen in clinic 2/27/2025: WBC 1000 cells/uL, Hgb 10.6 g/dL, platelets 53,000 cells/uL, ANC 10, , absolute monocyte count 20   - Precipitous drop of counts over the past month with context of low-grade temperatures and bone pain most consistent with relapsed leukemia   - Admission for Fever and Neutropenia 2/27/2025  - Double-lumen Broviac line placement, diagnostic bone marrow evaluation to include aspirate and biopsy, flow cytometry and FISH to confirm  relapse at bedside 2/28/2025  - Testicular negative at relapse  - CSF negative consistent with CNS1  - Confirmed 13% blasts in hemodilute BMA specimen by flow  - Confirmed BCR-ABL1 fusion in 17% cells by FISH  - Dr. Faye has already reached out to transplant colleagues to begin planning allogeneic transplant / CAR-T therapy   - After discussing multiple options, proposed to patient a 3-Drug Re-Induction (VCR/PRED/PEG-ASP) + Imatinib followed by blinatumomab  - Met virtually with Dr. Adrian, [place] BMT 3/20/2025    Bone Marrow Transplant Candidate:  - Ongoing [facility] with [facility]  - Met virtually with Dr. Adrian - will begin search for donor     Individualized Salvage Therapy, 3-Drug Re-Induction, Day 16:  ** Methotrexate 15 mg IT x 1 on Days 1 (COMPLETE, see separate procedure note)  ** Vincristine 2 mg IV x 1 on Days 1 (COMPLETE), 8 (COMPLETE), 15 (COMPLETE) and 22  ** Prednisone 30 mg/m2/dose = 60 mg PO BID x 28 days   ** PEG-Aspargase 2000 IU/m2 x 1 on Day 4 in OPIC (COMPLETE)   ** Imatinib 400 mg PO QAM and 250 mg PO QHS (started 3/6/2025, given resistence, discontinued)  ** Dasatinib 70 mg PO BID, Took a dose today morning  ** On Pepcid BID while on corticosteroids. However, pharmacist called me to report that pepcid has been known to decrease the efficacy of Dasatinib. Hence, switched to Tums and to be  by 2 hrs around Dasatinib administration.      Imatinib Resistance:  - E459K mutation in BCR:ABL1  - Start Dasatinib 70 mg BID     Disease Related Pancytopenia:  -  cells/uL, Hgb 7.4 g/dL, platelets 5,000 cells/uL  - ANC 40/microliters, /microliters, absolute monocyte count 0/microliters,  BLASTS: 0%  - Transfuse irradiated PRBC for Hgb<7 g/dL or symptomatic  - Transfuse irradiated platelets for platelet count of <10,000 cells/uL or symptomatic  - Will transfuse 1 unit of pRBCs and platelets today  - Neutropenic precautions     - CBC daily            At Risk for  Opportunistic Pulmonary Infection:  - Heavily pretreated  - Bactrim -160 mg PO BID Sat/Sun  - HSV1 + IgG, 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  - Consider levofloxacin PO as outpatient for prolonged neutropenia    FEN/GI:  - Regular diet  - IVF at 100 ml/hr  - BMP daily     At Risk For Chemotherapy Induced Nausea and Vomiting:  - Zofran 8 mg IV PRN ordered  - Ativan 1 mg IV PRN ordered      Hyperglycemia, Steroid Induced:  - Moderate  -  mg/dL today (no sugars > 200 mg/dL)  - Monitor for need for insulin     Transaminitis Secondary To Therapy:  - AST: 73 U/L, ALT: 252 U/L  - Bilirubin total 1.6 mg/dL  - Will continue to monitor    Central Access:  - Double-lumen CVL placed 2/28/2025 by surgery  - Saline flush per protocol     Psychosocial:  - Dr. Ortega following     Social:   - Referred  to Social Work and financial navigator     Disposition: Admitted for evaluation and management of rectal pain.      Alyce Duenas MD  Pediatric Hematology / Oncology  [facility]  Cell. [phone]  [facility] [phone]

## 2025-03-24 LAB
ANISOCYTOSIS BLD QL SMEAR: ABNORMAL
BACTERIA BLD CULT: ABNORMAL
BACTERIA BLD CULT: ABNORMAL
BARCODED ABORH UBTYP: 5100
BARCODED PRD CODE UBPRD: NORMAL
BARCODED UNIT NUM UBUNT: NORMAL
BASOPHILS # BLD AUTO: 0 % (ref 0–1.8)
BASOPHILS # BLD: 0 K/UL (ref 0–0.12)
COMMENT NL1176: NORMAL
COMPONENT P 8504P: NORMAL
EOSINOPHIL # BLD AUTO: 0 K/UL (ref 0–0.51)
EOSINOPHIL NFR BLD: 0 % (ref 0–6.9)
ERYTHROCYTE [DISTWIDTH] IN BLOOD BY AUTOMATED COUNT: 37.7 FL (ref 35.9–50)
HCT VFR BLD AUTO: 20.3 % (ref 42–52)
HGB BLD-MCNC: 7.3 G/DL (ref 14–18)
LYMPHOCYTES # BLD AUTO: 0.66 K/UL (ref 1–4.8)
LYMPHOCYTES NFR BLD: 73.2 % (ref 22–41)
MANUAL DIFF BLD: NORMAL
MCH RBC QN AUTO: 28.4 PG (ref 27–33)
MCHC RBC AUTO-ENTMCNC: 36 G/DL (ref 32.3–36.5)
MCV RBC AUTO: 79 FL (ref 81.4–97.8)
METAMYELOCYTES NFR BLD MANUAL: 1.4 %
MICROCYTES BLD QL SMEAR: ABNORMAL
MONOCYTES # BLD AUTO: 0 K/UL (ref 0–0.85)
MONOCYTES NFR BLD AUTO: 0 % (ref 0–13.4)
MORPHOLOGY BLD-IMP: NORMAL
NEUTROPHILS # BLD AUTO: 0.23 K/UL (ref 1.82–7.42)
NEUTROPHILS NFR BLD: 24 % (ref 44–72)
NEUTS BAND NFR BLD MANUAL: 1.4 % (ref 0–10)
NRBC # BLD AUTO: 0 K/UL
NRBC BLD-RTO: 0 /100 WBC (ref 0–0.2)
PLATELET # BLD AUTO: 12 K/UL (ref 164–446)
PLATELET BLD QL SMEAR: NORMAL
PLATELETS.RETICULATED NFR BLD AUTO: 2.2 % (ref 0.6–13.1)
PMV BLD AUTO: 10.4 FL (ref 9–12.9)
PRODUCT TYPE UPROD: NORMAL
RBC # BLD AUTO: 2.57 M/UL (ref 4.7–6.1)
RBC BLD AUTO: PRESENT
SIGNIFICANT IND 70042: ABNORMAL
SITE SITE: ABNORMAL
SMUDGE CELLS BLD QL SMEAR: NORMAL
SOURCE SOURCE: ABNORMAL
UNIT STATUS USTAT: NORMAL
VANCOMYCIN PEAK SERPL-MCNC: 14.8 UG/ML (ref 20–40)
VANCOMYCIN TROUGH SERPL-MCNC: 8.7 UG/ML (ref 10–20)
WBC # BLD AUTO: 0.9 K/UL (ref 4.8–10.8)

## 2025-03-24 PROCEDURE — 700111 HCHG RX REV CODE 636 W/ 250 OVERRIDE (IP): Mod: JZ | Performed by: PEDIATRICS

## 2025-03-24 PROCEDURE — 700102 HCHG RX REV CODE 250 W/ 637 OVERRIDE(OP): Performed by: PEDIATRICS

## 2025-03-24 PROCEDURE — P9034 PLATELETS, PHERESIS: HCPCS

## 2025-03-24 PROCEDURE — 700105 HCHG RX REV CODE 258: Performed by: PEDIATRICS

## 2025-03-24 PROCEDURE — A9270 NON-COVERED ITEM OR SERVICE: HCPCS | Performed by: PEDIATRICS

## 2025-03-24 PROCEDURE — 86945 BLOOD PRODUCT/IRRADIATION: CPT

## 2025-03-24 PROCEDURE — 80202 ASSAY OF VANCOMYCIN: CPT

## 2025-03-24 PROCEDURE — 770004 HCHG ROOM/CARE - ONCOLOGY PRIVATE *

## 2025-03-24 PROCEDURE — 85027 COMPLETE CBC AUTOMATED: CPT

## 2025-03-24 PROCEDURE — 700111 HCHG RX REV CODE 636 W/ 250 OVERRIDE (IP): Performed by: PEDIATRICS

## 2025-03-24 PROCEDURE — 85055 RETICULATED PLATELET ASSAY: CPT

## 2025-03-24 PROCEDURE — 85007 BL SMEAR W/DIFF WBC COUNT: CPT

## 2025-03-24 PROCEDURE — 36430 TRANSFUSION BLD/BLD COMPNT: CPT

## 2025-03-24 PROCEDURE — 99233 SBSQ HOSP IP/OBS HIGH 50: CPT | Performed by: PEDIATRICS

## 2025-03-24 RX ORDER — PANTOPRAZOLE SODIUM 40 MG/10ML
40 INJECTION, POWDER, LYOPHILIZED, FOR SOLUTION INTRAVENOUS DAILY
Status: DISCONTINUED | OUTPATIENT
Start: 2025-03-24 | End: 2025-03-29

## 2025-03-24 RX ORDER — ONDANSETRON 2 MG/ML
8 INJECTION INTRAMUSCULAR; INTRAVENOUS EVERY 8 HOURS
Status: DISCONTINUED | OUTPATIENT
Start: 2025-03-24 | End: 2025-04-05

## 2025-03-24 RX ORDER — SCOPOLAMINE 1 MG/3D
1 PATCH, EXTENDED RELEASE TRANSDERMAL
Status: DISCONTINUED | OUTPATIENT
Start: 2025-03-24 | End: 2025-04-09 | Stop reason: HOSPADM

## 2025-03-24 RX ADMIN — ANTACID TABLETS 1000 MG: 500 TABLET, CHEWABLE ORAL at 21:37

## 2025-03-24 RX ADMIN — HYDROCORTISONE: 1 CREAM TOPICAL at 09:35

## 2025-03-24 RX ADMIN — VANCOMYCIN HYDROCHLORIDE 1250 MG: 5 INJECTION, POWDER, LYOPHILIZED, FOR SOLUTION INTRAVENOUS at 02:12

## 2025-03-24 RX ADMIN — DASATINIB 70 MG: 70 TABLET ORAL at 06:36

## 2025-03-24 RX ADMIN — SODIUM CHLORIDE: 9 INJECTION, SOLUTION INTRAVENOUS at 02:13

## 2025-03-24 RX ADMIN — ONDANSETRON 8 MG: 2 INJECTION INTRAMUSCULAR; INTRAVENOUS at 05:37

## 2025-03-24 RX ADMIN — VANCOMYCIN HYDROCHLORIDE 1250 MG: 5 INJECTION, POWDER, LYOPHILIZED, FOR SOLUTION INTRAVENOUS at 17:23

## 2025-03-24 RX ADMIN — CEFEPIME 2 G: 2 INJECTION, POWDER, FOR SOLUTION INTRAVENOUS at 15:30

## 2025-03-24 RX ADMIN — ONDANSETRON 8 MG: 2 INJECTION INTRAMUSCULAR; INTRAVENOUS at 21:36

## 2025-03-24 RX ADMIN — CEFEPIME 2 G: 2 INJECTION, POWDER, FOR SOLUTION INTRAVENOUS at 02:41

## 2025-03-24 RX ADMIN — VANCOMYCIN HYDROCHLORIDE 1000 MG: 5 INJECTION, POWDER, LYOPHILIZED, FOR SOLUTION INTRAVENOUS at 23:20

## 2025-03-24 RX ADMIN — CHLORHEXIDINE GLUCONATE, 0.12% ORAL RINSE 15 ML: 1.2 SOLUTION DENTAL at 15:30

## 2025-03-24 RX ADMIN — PANTOPRAZOLE SODIUM 40 MG: 40 INJECTION, POWDER, FOR SOLUTION INTRAVENOUS at 11:36

## 2025-03-24 RX ADMIN — CHLORHEXIDINE GLUCONATE, 0.12% ORAL RINSE 15 ML: 1.2 SOLUTION DENTAL at 09:35

## 2025-03-24 RX ADMIN — HYDROCORTISONE: 1 CREAM TOPICAL at 17:24

## 2025-03-24 RX ADMIN — Medication 1 APPLICATOR: at 05:47

## 2025-03-24 RX ADMIN — ONDANSETRON 8 MG: 2 INJECTION INTRAMUSCULAR; INTRAVENOUS at 15:31

## 2025-03-24 RX ADMIN — Medication 1 APPLICATOR: at 17:30

## 2025-03-24 RX ADMIN — OXYCODONE HYDROCHLORIDE 5 MG: 5 TABLET ORAL at 21:39

## 2025-03-24 RX ADMIN — DASATINIB 70 MG: 70 TABLET ORAL at 18:00

## 2025-03-24 RX ADMIN — CEFEPIME 2 G: 2 INJECTION, POWDER, FOR SOLUTION INTRAVENOUS at 21:38

## 2025-03-24 RX ADMIN — SCOPOLAMINE 1 PATCH: 1.5 PATCH, EXTENDED RELEASE TRANSDERMAL at 09:39

## 2025-03-24 RX ADMIN — PREDNISONE 60 MG: 50 TABLET ORAL at 09:36

## 2025-03-24 RX ADMIN — METRONIDAZOLE 500 MG: 5 INJECTION, SOLUTION INTRAVENOUS at 17:23

## 2025-03-24 RX ADMIN — VANCOMYCIN HYDROCHLORIDE 1250 MG: 5 INJECTION, POWDER, LYOPHILIZED, FOR SOLUTION INTRAVENOUS at 09:36

## 2025-03-24 RX ADMIN — PREDNISONE 60 MG: 50 TABLET ORAL at 21:37

## 2025-03-24 RX ADMIN — METRONIDAZOLE 500 MG: 5 INJECTION, SOLUTION INTRAVENOUS at 05:42

## 2025-03-24 RX ADMIN — CHLORHEXIDINE GLUCONATE, 0.12% ORAL RINSE 15 ML: 1.2 SOLUTION DENTAL at 17:23

## 2025-03-24 RX ADMIN — ANTACID TABLETS 1000 MG: 500 TABLET, CHEWABLE ORAL at 09:35

## 2025-03-24 RX ADMIN — OXYCODONE HYDROCHLORIDE 10 MG: 5 TABLET ORAL at 09:35

## 2025-03-24 ASSESSMENT — PAIN DESCRIPTION - PAIN TYPE
TYPE: ACUTE PAIN

## 2025-03-24 NOTE — PROGRESS NOTES
Pediatric Hematology/Oncology  Daily Progress Note      Patient Name:  Tomas Jean-Bpatiste  : 2000  MRN: [id number]    Location of Service:  [facility] Cancer Nursing Unit  Date of Service: 3/23/2025  Time: 6:30 AM    Hospital Day: 2    Protocol / Treatment Plan: Individualized Re-Induction chemotherapy, 3 Drug + Tyrosine Kinase Inhibitor, Day 17    SUBJECTIVE:     Admitted to the Cancer Nursing Unit yesterday with complaints of rectal pain and blood in stool.  On admission Tomas Roy was noted to have exquisitely tender perirectal region.  No obvious swelling, erythema or fluctuance consistent with abscess.  No fevers on presentation.  Started on empiric antibiotics and blood cultures obtained from central line.     This morning, JULY reports that he still does not feel well and complains of general malaise and not feeling well.  He reports that he did have a bowel movement this morning that was exceptionally painful but that his pain has since improved.  He reports that clinically he feels much better than he did upon presentation last night.  No fevers overnight.  No other signs or symptoms of acute illness to include cough, congestion, shortness of breath or difficulty with breathing.  No complaints of any nausea, vomiting, or abdominal pain.  Pain with defecation as above.  No other bruising or bleeding.  No other rashes or skin changes.  No complaints of any headaches, changes in vision or neurologic status changes.  No other concerns or complaints at this time.    Review of Systems:     Constitutional: Afebrile, feeling better than yesterday, still with malaise and generally feeling ill.  HENT: Negative.  Eyes: Negative for visual changes.  Respiratory: Negative for shortness of breath.  No cough.  Cardiovascular: Negative.  Gastrointestinal: Negative for nausea, vomiting, abdominal pain.  Hematochezia.  Painful defecation.  Genitourinary: Negative.  Musculoskeletal: Negative for arm pain  "or leg pain.    Skin: Negative for rash or skin infection.  Neurological: Negative for numbness, tingling, sensory changes, weakness or headaches.    Endo/Heme/Allergies: No bruising/bleeding easily.    Psychiatric/Behavioral: Flat mood.     OBJECTIVE:     Max Temp: Temp (24hrs), Av.8 °C (98.2 °F), Min:36.5 °C (97.7 °F), Max:36.9 °C (98.5 °F)    Vitals: /57   Pulse 81   Temp 36.6 °C (97.9 °F) (Oral)   Resp 18   Ht 1.651 m (5' 5\")   Wt 72.3 kg (159 lb 6.3 oz)   SpO2 97%   BMI 26.52 kg/m²     I/O:   Intake/Output Summary (Last 24 hours) at 3/23/2025 2221  Last data filed at 3/23/2025 0800  Gross per 24 hour   Intake 491.33 ml   Output 200 ml   Net 291.33 ml     Labs:    Most Recent Hematology Labs:    Lab Results   Component Value Date/Time    WBC 0.6 (LL) 2025 0938    HEMOGLOBIN 8.0 (L) 2025 0938    MCV 80.3 (L) 2025 0938    PLATELETCT 12 (LL) 2025 0938    NEUTS 0.09 (LL) 2025 0938       Most Recent Metabolic Panel:    Lab Results   Component Value Date/Time    SODIUM 131 (L) 2025 0938    POTASSIUM 4.3 2025 0938    CHLORIDE 100 2025 0938    CO2 21 2025 0938    GLUCOSE 111 (H) 2025 0938    BUN 15 2025 0938    CREATININE 0.58 2025 0938    CALCIUM 7.0 (L) 2025 0938    ANION 10.0 2025 09       Blood cultures obtained 3/22/2025 positive from both lines with no differential time to positivity.  Currently growing gram-positive cocci in clusters    Physical Exam:    Constitutional: Ill-appearing, nontoxic appearing.  HENT: Normocephalic and atraumatic.  No rhinorrhea. Oropharynx is clear and moist.   Eyes: Conjunctivae are normal. EOMI. Non-icteric. Pupils equal and round.  Neck: Normal range of motion of neck, no adenopathy.    Cardiovascular: Normal rate, regular rhythm.  No murmur. DP/radial pulses 2+, cap refill < 2 sec.  Pulmonary/Chest: Effort normal. No respiratory distress. Symmetric expansion.  No crackles or " wheezes.  Abdomen: Soft. Bowel sounds are normal. No distension and no mass. There is no hepatosplenomegaly.    Genitourinary:  Deferred  Musculoskeletal: Normal range of motion of lower and upper extremities bilaterally.   Neurological: Alert and oriented to person and place. Exhibits normal muscle tone bilaterally in upper and lower extremities. Gait not assessed.  Skin: Skin is warm, dry and pink.  No rash or evidence of skin infection.   Psychiatric: Mood is flat.    GI/ examination deferred today.    ASSESSMENT AND PLAN:     Tomas Jean-Baptiste is a 24 y.o. 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 who presented with rectal pain and found to have gram-positive bacteremia     1) Gram Positive Bacteremia:   - Blood cultures obtained on presentation 3/22/2025 from CVL (both lumens) growing gram-positive cocci in clusters   - No differential time to positivity, inconsistent with CLABSI   - Was placed on empiric cefepime and Flagyl upon admission (continue empirically)   - Given findings of gram-positive cocci in clusters without speciation as of yet, vancomycin added to cover for methicillin-resistant staphylococcal aureus (48-hour rule out while awaiting culture speciation and susceptibilities)   - Hemodynamically stable and afebrile, will continue to monitor closely   - Depending on organism identified, will discuss line management    2) Constipation/Rectal Pain/Pain with Defecation/Hematochezia:  - Concern for internal hemorrhoids vs abscess/fistula  - Blood culture as above, no gram-negative organisms identified  - Cefepime 2 mg IV every 8 hrs empirically despite cultures growing gram-positive organisms only  - Flagyl 500 mg IV every 12 hrs will narrow coverage upon resolution of rectal pain/rule out abscess/fistula  - Morphine 2 mg IV x 1   - Tylenol every 6 hrs PRN for mild pain  - Morphine 2 mg IV every 3 hrs PRN for moderate-severe pain  - Oxycodone 5-10 mg  PO every 4 hrs PRN for moderate-severe pain  - Senna 2 tablets BID, titrate to effect  - Preparation H, apply externally   - Sitz bath PRN  - MRI Pelvis ordered but not obtained as radiology indicating that MRI-RECTUM would need to be ordered as an outpatient on a 3T scanner, will call and inquire in the morning  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, received cranial radiation 6/5/2023 to 6/16/2023  - Testicular disease negative at diagnosis  - Several complications throughout therapy to include severe septic shock 12/27/2022 while in Delayed Intensification  - Completed therapy 5/30/2024  - Seen in clinic on 1/15/2025 without any evidence of relapse (clinical/laboratory)  - Reported viral URI symptoms approximately 2.5 weeks prior to presentation   - Interval resolution of viral URI symptoms followed by very acute worsening of flulike symptoms as well as aches and pains  - Seen in clinic 2/27/2025: WBC 1000 cells/uL, Hgb 10.6 g/dL, platelets 53,000 cells/uL, ANC 10, , absolute monocyte count 20   - Precipitous drop of counts over the past month with context of low-grade temperatures and bone pain most consistent with relapsed leukemia   - Admission for Fever and Neutropenia 2/27/2025  - Double-lumen Broviac line placement, diagnostic bone marrow evaluation to include aspirate and biopsy, flow cytometry and FISH to confirm relapse at bedside 2/28/2025  - Testicular negative at relapse  - CSF negative consistent with CNS1  - Confirmed 13% blasts in hemodilute BMA specimen by flow  - Confirmed BCR-ABL1 fusion in 17% cells by FISH  - Discussions with transplant colleagues regarding planning allogeneic transplant / CAR-T therapy   - After discussing multiple options, proposed to patient a 3-Drug Re-Induction  (VCR/PRED/PEG-ASP) + Imatinib followed by blinatumomab  - Met virtually with Dr. 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 Adrian - will begin search for donor     5) Individualized Salvage Therapy, 3-Drug Re-Induction, Day 17:  ** Methotrexate 15 mg IT x 1 on Days 1 (COMPLETE, see separate procedure note)  ** Vincristine 2 mg IV x 1 on Days 1 (COMPLETE), 8 (COMPLETE), 15 (COMPLETE) and 22  ** Prednisone 30 mg/m2/dose = 60 mg PO BID x 28 days   ** PEG-Aspargase 2000 IU/m2 x 1 on Day 4 in OPIC (COMPLETE)   ** Imatinib 400 mg PO QAM and 250 mg PO QHS (started 3/6/2025, given resistence, discontinued)  ** Dasatinib 70 mg PO BID, started 3/22/2025  ** On Pepcid BID while on corticosteroids. However, pharmacist called me to report that pepcid has been known to decrease the efficacy of Dasatinib. Hence, switched to Tums and to be  by 2 hrs around Dasatinib administration.       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  - Consider levofloxacin PO as outpatient for prolonged neutropenia     9) FEN/GI:  - Regular diet  - IVF at 100 ml/hr  - BMP daily     10) At Risk For Chemotherapy Induced Nausea and Vomiting:  - Zofran 8 mg IV PRN ordered  - Ativan 1 mg IV PRN  ordered      11) Hyperglycemia, Steroid Induced:  - Moderate  -  mg/dL today (no sugars > 200 mg/dL)  - Monitor for need for insulin     12) Transaminitis Secondary To Therapy:  - AST: 73 U/L, ALT: 252 U/L 3/22/2025  - Bilirubin total 1.6 mg/dL 3/22/2025  - Will continue to monitor     13) Central Access:  - Double-lumen CVL placed 2/28/2025 by surgery  - Saline flush per protocol    - Will discuss line management when speciation of bacteremia is complete    14) Psychosocial:  - Dr. Ortega following     15) Social:    - Referred  to Social Work and financial navigator     Disposition: Remain inpatient for treatment of bacteremia.    Pepe Faye MD  Pediatric Hematology / Oncology  [facility]  Cell.  [phone]  Office. [phone]

## 2025-03-24 NOTE — CARE PLAN
The patient is Watcher - Medium risk of patient condition declining or worsening    Shift Goals  Clinical Goals: Patient will have pain controlled and tolerate IV abx  Patient Goals: Patient will remain afebrile and have tolerable pain level throughout the shift  Family Goals: none present    Progress made toward(s) clinical / shift goals:      Problem: Knowledge Deficit - Standard  Goal: Patient and family/care givers will demonstrate understanding of plan of care, disease process/condition, diagnostic tests and medications  Outcome: Progressing   Patient oriented x4. Patient updated on POC. Questions answered at this time.     Problem: Pain - Standard  Goal: Alleviation of pain or a reduction in pain to the patient’s comfort goal  Outcome: Progressing   Patient medicated per MAR for pain. Patient states improvement in pain after interventions.     Problem: Fall Risk  Goal: Patient will remain free from falls  Outcome: Progressing   Patient educated on fall prevention and demonstrates understanding. Bed locked and in lowest position. Call light and patient belongings within reach. Patient refuses bed alarm. Patient calling appropriately for assistance.     Patient is not progressing towards the following goals: N/a

## 2025-03-24 NOTE — Clinical Note
REFERRAL APPROVAL NOTICE         Sent on March 24, 2025                   JULY Jean-Baptiste  [address] [place] [address]                   Dear Mr. Jean-Baptiste,    After a careful review of the medical information and benefit coverage, [facility] has processed your referral. See below for additional details.    If applicable, you must be actively enrolled with your insurance for coverage of the authorized service. If you have any questions regarding your coverage, please contact your insurance directly.    REFERRAL INFORMATION   Referral #:  [id number]  Referred-To Provider    Referred-By Provider:  Hematology & Oncology    BRIAN Casas KARA L      [address]  [place] [address]  [phone] [address]  [address]  Pediatric Hematology and Oncology  [facility] [address]  [phone]    Referral Start Date:  03/20/2025  Referral End Date:   03/20/2026             SCHEDULING  If you do not already have an appointment, please call [phone] to make an appointment.     MORE INFORMATION  If you do not already have a [employer] account, sign up at: [email or web address]  You can access your medical information, make appointments, see lab results, billing information, and more.  If you have questions regarding this referral, please contact  the [facility] Referrals department at:             [phone]. Monday - Friday 8:00AM - 5:00PM.     Sincerely,    [facility]

## 2025-03-24 NOTE — PROGRESS NOTES
4 Eyes Skin Assessment Completed by MONTSERRAT Wu and MONTSERRAT Lau.    Head WDL  Ears WDL  Nose WDL  Mouth WDL  Neck WDL  Breast/Chest WDL  Shoulder Blades WDL  Spine WDL  (R) Arm/Elbow/Hand WDL  (L) Arm/Elbow/Hand WDL  Abdomen WDL  Groin WDL  Scrotum/Coccyx/Buttocks WDL  (R) Leg WDL  (L) Leg WDL  (R) Heel/Foot/Toe WDL  (L) Heel/Foot/Toe WDL          Devices In Places       Interventions In Place N/A    Possible Skin Injury No    Pictures Uploaded Into Epic No, needs to be completed  Wound Consult Placed N/A  RN Wound Prevention Protocol Ordered No

## 2025-03-24 NOTE — CARE PLAN
Problem: Knowledge Deficit - Standard  Goal: Patient and family/care givers will demonstrate understanding of plan of care, disease process/condition, diagnostic tests and medications  Outcome: Progressing     Problem: Pain - Standard  Goal: Alleviation of pain or a reduction in pain to the patient’s comfort goal  Outcome: Progressing     The patient is Watcher - Medium risk of patient condition declining or worsening    Shift Goals  Clinical Goals: monitor for need of blood transfusion, tolerate ABX  Patient Goals: pain control and sleep  Family Goals: not atbedside    Progress made toward(s) clinical / shift goals:  Pt updated on POC    Patient is not progressing towards the following goals:

## 2025-03-25 ENCOUNTER — APPOINTMENT (OUTPATIENT)
Dept: RADIOLOGY | Facility: MEDICAL CENTER | Age: 25
End: 2025-03-25
Attending: PEDIATRICS
Payer: COMMERCIAL

## 2025-03-25 LAB
25(OH)D3 SERPL-MCNC: <6 NG/ML (ref 30–100)
ALBUMIN SERPL BCP-MCNC: 2.2 G/DL (ref 3.2–4.9)
ALBUMIN/GLOB SERPL: 2 G/DL
ALP SERPL-CCNC: 86 U/L (ref 30–99)
ALT SERPL-CCNC: 170 U/L (ref 2–50)
ANION GAP SERPL CALC-SCNC: 10 MMOL/L (ref 7–16)
ANION GAP SERPL CALC-SCNC: 7 MMOL/L (ref 7–16)
ANISOCYTOSIS BLD QL SMEAR: ABNORMAL
AST SERPL-CCNC: 56 U/L (ref 12–45)
BASOPHILS # BLD AUTO: 0 % (ref 0–1.8)
BASOPHILS # BLD: 0 K/UL (ref 0–0.12)
BILIRUB SERPL-MCNC: 1.6 MG/DL (ref 0.1–1.5)
BUN SERPL-MCNC: 11 MG/DL (ref 8–22)
BUN SERPL-MCNC: 11 MG/DL (ref 8–22)
CALCIUM ALBUM COR SERPL-MCNC: 7.7 MG/DL (ref 8.5–10.5)
CALCIUM SERPL-MCNC: 6.3 MG/DL (ref 8.5–10.5)
CALCIUM SERPL-MCNC: 6.6 MG/DL (ref 8.5–10.5)
CHLORIDE SERPL-SCNC: 102 MMOL/L (ref 96–112)
CHLORIDE SERPL-SCNC: 105 MMOL/L (ref 96–112)
CO2 SERPL-SCNC: 22 MMOL/L (ref 20–33)
CO2 SERPL-SCNC: 22 MMOL/L (ref 20–33)
COMMENT NL1176: NORMAL
CREAT SERPL-MCNC: 0.45 MG/DL (ref 0.5–1.4)
CREAT SERPL-MCNC: 0.47 MG/DL (ref 0.5–1.4)
EOSINOPHIL # BLD AUTO: 0 K/UL (ref 0–0.51)
EOSINOPHIL NFR BLD: 0 % (ref 0–6.9)
ERYTHROCYTE [DISTWIDTH] IN BLOOD BY AUTOMATED COUNT: 37.9 FL (ref 35.9–50)
GFR SERPLBLD CREATININE-BSD FMLA CKD-EPI: 149 ML/MIN/1.73 M 2
GFR SERPLBLD CREATININE-BSD FMLA CKD-EPI: 151 ML/MIN/1.73 M 2
GLOBULIN SER CALC-MCNC: 1.1 G/DL (ref 1.9–3.5)
GLUCOSE SERPL-MCNC: 125 MG/DL (ref 65–99)
GLUCOSE SERPL-MCNC: 134 MG/DL (ref 65–99)
HCT VFR BLD AUTO: 19.7 % (ref 42–52)
HGB BLD-MCNC: 7 G/DL (ref 14–18)
LYMPHOCYTES # BLD AUTO: 0.35 K/UL (ref 1–4.8)
LYMPHOCYTES NFR BLD: 50.5 % (ref 22–41)
MANUAL DIFF BLD: NORMAL
MCH RBC QN AUTO: 28.6 PG (ref 27–33)
MCHC RBC AUTO-ENTMCNC: 35.5 G/DL (ref 32.3–36.5)
MCV RBC AUTO: 80.4 FL (ref 81.4–97.8)
METAMYELOCYTES NFR BLD MANUAL: 0.9 %
MICROCYTES BLD QL SMEAR: ABNORMAL
MISCELLANEOUS LAB RESULT MISCLAB: NORMAL
MONOCYTES # BLD AUTO: 0 K/UL (ref 0–0.85)
MONOCYTES NFR BLD AUTO: 0 % (ref 0–13.4)
MORPHOLOGY BLD-IMP: NORMAL
NEUTROPHILS # BLD AUTO: 0.34 K/UL (ref 1.82–7.42)
NEUTROPHILS NFR BLD: 48.6 % (ref 44–72)
NRBC # BLD AUTO: 0 K/UL
NRBC BLD-RTO: 0 /100 WBC (ref 0–0.2)
PLATELET # BLD AUTO: 38 K/UL (ref 164–446)
PLATELET BLD QL SMEAR: NORMAL
PLATELETS.RETICULATED NFR BLD AUTO: 1.1 % (ref 0.6–13.1)
PMV BLD AUTO: 9.4 FL (ref 9–12.9)
POIKILOCYTOSIS BLD QL SMEAR: NORMAL
POTASSIUM SERPL-SCNC: 3.8 MMOL/L (ref 3.6–5.5)
POTASSIUM SERPL-SCNC: 3.9 MMOL/L (ref 3.6–5.5)
PROT SERPL-MCNC: 3.3 G/DL (ref 6–8.2)
RBC # BLD AUTO: 2.45 M/UL (ref 4.7–6.1)
RBC BLD AUTO: PRESENT
SMUDGE CELLS BLD QL SMEAR: NORMAL
SODIUM SERPL-SCNC: 134 MMOL/L (ref 135–145)
SODIUM SERPL-SCNC: 134 MMOL/L (ref 135–145)
TARGETS BLD QL SMEAR: NORMAL
VANCOMYCIN TROUGH SERPL-MCNC: 9.9 UG/ML (ref 10–20)
WBC # BLD AUTO: 0.7 K/UL (ref 4.8–10.8)

## 2025-03-25 PROCEDURE — 86945 BLOOD PRODUCT/IRRADIATION: CPT

## 2025-03-25 PROCEDURE — 700105 HCHG RX REV CODE 258: Performed by: PEDIATRICS

## 2025-03-25 PROCEDURE — A9579 GAD-BASE MR CONTRAST NOS,1ML: HCPCS | Mod: JZ | Performed by: PEDIATRICS

## 2025-03-25 PROCEDURE — 87040 BLOOD CULTURE FOR BACTERIA: CPT

## 2025-03-25 PROCEDURE — 85007 BL SMEAR W/DIFF WBC COUNT: CPT

## 2025-03-25 PROCEDURE — 80053 COMPREHEN METABOLIC PANEL: CPT

## 2025-03-25 PROCEDURE — 700111 HCHG RX REV CODE 636 W/ 250 OVERRIDE (IP): Performed by: PEDIATRICS

## 2025-03-25 PROCEDURE — 82306 VITAMIN D 25 HYDROXY: CPT

## 2025-03-25 PROCEDURE — 80048 BASIC METABOLIC PNL TOTAL CA: CPT

## 2025-03-25 PROCEDURE — 72197 MRI PELVIS W/O & W/DYE: CPT

## 2025-03-25 PROCEDURE — 85027 COMPLETE CBC AUTOMATED: CPT

## 2025-03-25 PROCEDURE — A9270 NON-COVERED ITEM OR SERVICE: HCPCS | Performed by: PEDIATRICS

## 2025-03-25 PROCEDURE — 36430 TRANSFUSION BLD/BLD COMPNT: CPT

## 2025-03-25 PROCEDURE — 86923 COMPATIBILITY TEST ELECTRIC: CPT

## 2025-03-25 PROCEDURE — 99233 SBSQ HOSP IP/OBS HIGH 50: CPT | Performed by: PEDIATRICS

## 2025-03-25 PROCEDURE — 85055 RETICULATED PLATELET ASSAY: CPT

## 2025-03-25 PROCEDURE — 36415 COLL VENOUS BLD VENIPUNCTURE: CPT

## 2025-03-25 PROCEDURE — 80202 ASSAY OF VANCOMYCIN: CPT

## 2025-03-25 PROCEDURE — 700111 HCHG RX REV CODE 636 W/ 250 OVERRIDE (IP): Mod: JZ | Performed by: PEDIATRICS

## 2025-03-25 PROCEDURE — 30243N1 TRANSFUSION OF NONAUTOLOGOUS RED BLOOD CELLS INTO CENTRAL VEIN, PERCUTANEOUS APPROACH: ICD-10-PCS | Performed by: PEDIATRICS

## 2025-03-25 PROCEDURE — 700117 HCHG RX CONTRAST REV CODE 255: Mod: JZ | Performed by: PEDIATRICS

## 2025-03-25 PROCEDURE — 770004 HCHG ROOM/CARE - ONCOLOGY PRIVATE *

## 2025-03-25 PROCEDURE — P9016 RBC LEUKOCYTES REDUCED: HCPCS

## 2025-03-25 PROCEDURE — 700102 HCHG RX REV CODE 250 W/ 637 OVERRIDE(OP): Performed by: PEDIATRICS

## 2025-03-25 RX ADMIN — CHLORHEXIDINE GLUCONATE, 0.12% ORAL RINSE 15 ML: 1.2 SOLUTION DENTAL at 18:11

## 2025-03-25 RX ADMIN — HYDROCORTISONE: 1 CREAM TOPICAL at 18:00

## 2025-03-25 RX ADMIN — LORAZEPAM 1 MG: 2 INJECTION INTRAMUSCULAR; INTRAVENOUS at 09:01

## 2025-03-25 RX ADMIN — PREDNISONE 60 MG: 50 TABLET ORAL at 21:55

## 2025-03-25 RX ADMIN — Medication 1 APPLICATOR: at 18:11

## 2025-03-25 RX ADMIN — CHLORHEXIDINE GLUCONATE, 0.12% ORAL RINSE 15 ML: 1.2 SOLUTION DENTAL at 13:36

## 2025-03-25 RX ADMIN — METRONIDAZOLE 500 MG: 5 INJECTION, SOLUTION INTRAVENOUS at 18:11

## 2025-03-25 RX ADMIN — VANCOMYCIN HYDROCHLORIDE 1000 MG: 5 INJECTION, POWDER, LYOPHILIZED, FOR SOLUTION INTRAVENOUS at 04:46

## 2025-03-25 RX ADMIN — ANTACID TABLETS 1000 MG: 500 TABLET, CHEWABLE ORAL at 09:00

## 2025-03-25 RX ADMIN — ANTACID TABLETS 1000 MG: 500 TABLET, CHEWABLE ORAL at 21:54

## 2025-03-25 RX ADMIN — PREDNISONE 60 MG: 50 TABLET ORAL at 09:01

## 2025-03-25 RX ADMIN — VANCOMYCIN HYDROCHLORIDE 1000 MG: 5 INJECTION, POWDER, LYOPHILIZED, FOR SOLUTION INTRAVENOUS at 18:11

## 2025-03-25 RX ADMIN — CEFEPIME 2 G: 2 INJECTION, POWDER, FOR SOLUTION INTRAVENOUS at 21:55

## 2025-03-25 RX ADMIN — CHLORHEXIDINE GLUCONATE, 0.12% ORAL RINSE 15 ML: 1.2 SOLUTION DENTAL at 21:54

## 2025-03-25 RX ADMIN — GADOTERIDOL 15 ML: 279.3 INJECTION, SOLUTION INTRAVENOUS at 16:18

## 2025-03-25 RX ADMIN — ONDANSETRON 8 MG: 2 INJECTION INTRAMUSCULAR; INTRAVENOUS at 04:43

## 2025-03-25 RX ADMIN — DASATINIB 70 MG: 70 TABLET ORAL at 04:47

## 2025-03-25 RX ADMIN — METRONIDAZOLE 500 MG: 5 INJECTION, SOLUTION INTRAVENOUS at 07:07

## 2025-03-25 RX ADMIN — Medication 1 APPLICATOR: at 06:00

## 2025-03-25 RX ADMIN — ONDANSETRON 8 MG: 2 INJECTION INTRAMUSCULAR; INTRAVENOUS at 21:54

## 2025-03-25 RX ADMIN — PANTOPRAZOLE SODIUM 40 MG: 40 INJECTION, POWDER, FOR SOLUTION INTRAVENOUS at 04:43

## 2025-03-25 RX ADMIN — CEFEPIME 2 G: 2 INJECTION, POWDER, FOR SOLUTION INTRAVENOUS at 14:45

## 2025-03-25 RX ADMIN — DASATINIB 70 MG: 70 TABLET ORAL at 18:00

## 2025-03-25 RX ADMIN — CHLORHEXIDINE GLUCONATE, 0.12% ORAL RINSE 15 ML: 1.2 SOLUTION DENTAL at 09:01

## 2025-03-25 RX ADMIN — CEFEPIME 2 G: 2 INJECTION, POWDER, FOR SOLUTION INTRAVENOUS at 04:46

## 2025-03-25 RX ADMIN — VANCOMYCIN HYDROCHLORIDE 1000 MG: 5 INJECTION, POWDER, LYOPHILIZED, FOR SOLUTION INTRAVENOUS at 12:30

## 2025-03-25 RX ADMIN — SENNOSIDES AND DOCUSATE SODIUM 2 TABLET: 50; 8.6 TABLET ORAL at 04:43

## 2025-03-25 RX ADMIN — LORAZEPAM 1 MG: 2 INJECTION INTRAMUSCULAR; INTRAVENOUS at 14:56

## 2025-03-25 ASSESSMENT — PAIN DESCRIPTION - PAIN TYPE
TYPE: ACUTE PAIN
TYPE: ACUTE PAIN

## 2025-03-25 NOTE — PROGRESS NOTES
Pharmacy Vancomycin Kinetics Note for 3/24/2025     24 y.o. male on Vancomycin day # 2       Vancomycin Indication (Two level): Sepsis (goal trough 15-20)    Provider specified end date: 03/25/25    Active Antibiotics (From admission, onward)      Ordered     Ordering Provider       Sun Mar 23, 2025  8:57 AM    03/23/25 0857  vancomycin (Vancocin) 1,250 mg in  mL IVPB  (vancomycin (VANCOCIN) IV (LD + Maintenance))  EVERY 8 HOURS         Pepe Faye M.D.       [place] Mar 23, 2025  8:46 AM    03/23/25 0846  MD Alert...Vancomycin per Pharmacy  (MD Alert...Vancomycin per Pharmacy)  PHARMACY TO DOSE        Question Answer Comment   Indication(s) for vancomycin? Line infection    Indication(s) for vancomycin? Unknown source of infection    Indication(s) for vancomycin? Other (comments)        Pepe Faye M.D.       Sat Mar 22, 2025  4:57 PM    03/22/25 1657  sulfamethoxazole-trimethoprim (Bactrim DS) 800-160 MG tablet 1 Tablet  2 times per day on Sunday Saturday         Alyce Duenas M.D.       Sat Mar 22, 2025  3:36 PM    03/22/25 1536  cefepime (Maxipime) 2 g in  mL IVPB  EVERY 8 HOURS        Note to Pharmacy: First Dose STAT please    Alyce Duenas M.D.    03/22/25 1536  metroNIDAZOLE (Flagyl) IVPB 500 mg  EVERY 12 HOURS        Note to Pharmacy: First dose STAT please    Alyce Duenas M.D.            Dosing Weight: 72.3 kg (159 lb 6.3 oz)      Admission History: Admitted on 3/22/2025 for Acute lymphoblastic leukemia (ALL) in relapse ([facility]) [C91.02]  Pertinent history: Pt with PMH of Ph+ B-ALL admitted for rectal bleeding. Cefepime and Flagyl initiated as pt neutropenic. Pt on maintenance dasatinib, last chemo received 3/6/25. Vancomycin added on with concerns for line infection.    Allergies:     Amoxicillin     Pertinent cultures to date:     Results       Procedure Component Value Units Date/Time    Blood Culture [[id number]]  (Abnormal) Collected: 03/22/25 1713    Order Status: Completed Specimen: Blood  "from Line Updated: 25 1036     Significant Indicator POS     Source BLD     Site LINE     Culture Result Growth detected by automated blood culture system. 08:23      Rothia mucilaginosa    Blood Culture [[id number]]  (Abnormal) Collected: 25 1713    Order Status: Completed Specimen: Blood from Line Updated: 25 1036     Significant Indicator POS     Source BLD     Site LINE     Culture Result Growth detected by automated blood culture system. 08:22      Rothia mucilaginosa    MRSA By PCR (Amp) [[id number]] Collected: 25 0932    Order Status: Completed Specimen: Respirate from Nares Updated: 25 1657     MRSA by PCR Negative    Blood Culture [[id number]]     Order Status: Canceled Specimen: Blood from Line     Blood Culture [[id number]]     Order Status: Canceled Specimen: Blood from Peripheral             Labs:     Estimated Creatinine Clearance: 172.3 mL/min (by C-G formula based on SCr of 0.58 mg/dL).  Recent Labs     25  0938 25  0210   WBC 0.3* 0.6* 0.9*   NEUTSPOLYS 13.30* 14.30* 24.00*   BANDSSTABS  --   --  1.40     Recent Labs     25  0938   BUN 18 15   CREATININE 0.61 0.58   ALBUMIN 2.8*  --        Intake/Output Summary (Last 24 hours) at 3/24/2025 2136  Last data filed at 3/24/2025 1811  Gross per 24 hour   Intake 354 ml   Output 600 ml   Net -246 ml      /65   Pulse (!) 8   Temp 37 °C (98.6 °F) (Temporal)   Resp 18   Ht 1.651 m (5' 5\")   Wt 72.3 kg (159 lb 6.3 oz)   SpO2 99%  Temp (24hrs), Av.8 °C (98.2 °F), Min:36.5 °C (97.7 °F), Max:37 °C (98.6 °F)      List concerns for Vancomycin clearance:     Abnormal LFTs    Pharmacokinetics:     Two level kinetics:   Ke (hr ^-1): 0.1687  Half life: 4.1  Vd: First dose (AUC only) Vd : 46.759  Calculated AUC: 350 mg·hr/L    Trough kinetics:   Recent Labs     25  1408 25  1717   JESSENIA  --  8.7*   VANCLAMONTE 14.8*  --        A/P:     -  Vancomycin dose: 1,000 mg " Q6H    -  Next vancomycin level(s):    - scheduled to end 3/25. Pharmacy will assess timing of next levels (after 5th dose) if duration of vancomycin is continued     -  Predicted vancomycin AUC from two level test calculator: 504 mg·hr/L    -  Comments: Initial AUC subtherapeutic at 350 mgxh/.L. Adjusted dose to target AUC 500pharmacy will monitor and adjust dosing as appropriate, if therapy continued beyond 3/25    Solitario Erickson, XochiltD

## 2025-03-25 NOTE — DISCHARGE PLANNING
Case Management Discharge Planning    Admission Date: 3/22/2025  GMLOS: 2.9  ALOS: 3    6-Clicks ADL Score: 24  6-Clicks Mobility Score: 24      Anticipated Discharge Dispo: Discharge Disposition: Discharged to home/self care (01)    DME Needed: No    Action(s) Taken: Discussed in IDT rounds, plan to continue IV ABX at this time. MRI pending at this time.     Escalations Completed: None    Medically Clear: No    Next Steps: Pending medical clearance.    Barriers to Discharge: Medical clearance    Is the patient up for discharge tomorrow: No

## 2025-03-25 NOTE — PROGRESS NOTES
Pediatric Hematology/Oncology  Daily Progress Note      Patient Name:  Tomas Jean-Baptiste  : 2000  MRN: [id number]    Location of Service:  [facility]  Date of Service: 3/24/2025  Time: 9:00 AM    Hospital Day: 3    Protocol / Treatment Plan: Individualized Re-Induction chemotherapy, 3 Drug + Tyrosine Kinase Inhibitor, Day 18    SUBJECTIVE:     No acute events overnight.  Afebrile Tmax 98.6 °F overnight.  This morning, Tomas Roy reports that he is feeling pretty terrible.  He reports that he had multiple stools this morning which were all exceptionally painful.  He reports that he is fatigued and feeling ill.  He denies any headaches or changes in vision.  No neurologic changes at this time.  He does report however that he has nausea without vomiting.  Difficulty with defecation.  He reports that he has abdominal pain just below his umbilicus.  No skin changes or rashes.  No easy bruising or bleeding.  Taking Dasatinib but reports that it is beating him up.  No other concerns or complaints at this time.    Review of Systems:     Constitutional: Afebrile, significant malaise and generally feeling ill.  This morning was rough given frequent stooling.  HENT: Negative.  Eyes: Negative for visual changes.  Respiratory: Negative for shortness of breath.  No cough.  Cardiovascular: Negative.  Gastrointestinal: Negative for nausea, vomiting, abdominal pain.  No hematochezia overnight..  Painful defecation.  Genitourinary: Negative.  Musculoskeletal: Negative for arm pain or leg pain.    Skin: Negative for rash or skin infection.  Neurological: Negative for numbness, tingling, sensory changes, weakness or headaches.    Endo/Heme/Allergies: No bruising/bleeding easily.    Psychiatric/Behavioral: Flat mood.     OBJECTIVE:     Max Temp: Temp (24hrs), Av.8 °C (98.2 °F), Min:36.5 °C (97.7 °F), Max:37 °C (98.6 °F)    Vitals: /65   Pulse (!) 8   Temp 37 °C (98.6 °F) (Temporal)  "  Resp 18   Ht 1.651 m (5' 5\")   Wt 72.3 kg (159 lb 6.3 oz)   SpO2 99%   BMI 26.52 kg/m²     I/O:   Intake/Output Summary (Last 24 hours) at 3/24/2025 2216  Last data filed at 3/24/2025 1811  Gross per 24 hour   Intake 354 ml   Output 600 ml   Net -246 ml     Labs:     Latest Reference Range & Units 03/24/25 02:10   WBC 4.8 - 10.8 K/uL 0.9 (LL)   RBC 4.70 - 6.10 M/uL 2.57 (L)   Hemoglobin 14.0 - 18.0 g/dL 7.3 (L)   Hematocrit 42.0 - 52.0 % 20.3 (L)   MCV 81.4 - 97.8 fL 79.0 (L)   MCH 27.0 - 33.0 pg 28.4   MCHC 32.3 - 36.5 g/dL 36.0   RDW 35.9 - 50.0 fL 37.7   Platelet Count 164 - 446 K/uL 12 (LL)   MPV 9.0 - 12.9 fL 10.4   Neutrophils-Polys 44.00 - 72.00 % 24.00 (L)   Neutrophils (Absolute) 1.82 - 7.42 K/uL 0.23 (LL)   Bands-Stabs 0.00 - 10.00 % 1.40   Lymphocytes 22.00 - 41.00 % 73.20 (H)   Lymphs (Absolute) 1.00 - 4.80 K/uL 0.66 (L)   Monocytes 0.00 - 13.40 % 0.00   Monos (Absolute) 0.00 - 0.85 K/uL 0.00   Eosinophils 0.00 - 6.90 % 0.00   Eos (Absolute) 0.00 - 0.51 K/uL 0.00   Basophils 0.00 - 1.80 % 0.00   Baso (Absolute) 0.00 - 0.12 K/uL 0.00   Metamyelocytes % 1.40   Nucleated RBC 0.00 - 0.20 /100 WBC 0.00   NRBC (Absolute) K/uL 0.00   Plt Estimation  SEE BELOW   Imm. Plt Fraction 0.6 - 13.1 % 2.2   RBC Morphology  Present   Anisocytosis  1+   Microcytosis  1+   Smudge Cells  Moderate   Peripheral Smear Review  see below   Manual Diff Status  PERFORMED   Comment  See Comment   (LL): Data is critically low  (L): Data is abnormally low  (H): Data is abnormally high    Blood cultures obtained 3/22/2025 positive from both lines with no differential time to positivity.  Currently growing gram-positive cocci in clusters    Today, blood cultures speciated as Rothia mucilaginosa .  Rothia is historically difficult to isolate and speciate.  Hospital and Pediatric Oncology service with previous patients that have had resistant Rothia bacteremias requiring either vancomycin or daptomycin.  Will attempt to get " susceptibilities and isolate.  In the meantime, continue with vancomycin.    Physical Exam:    Constitutional: Ill-appearing, mildly toxic appearing today  HENT: Normocephalic and atraumatic.  No rhinorrhea. Oropharynx is clear and moist.   Eyes: Conjunctivae are normal. EOMI. Non-icteric. Pupils equal and round.  Neck: Normal range of motion of neck, no adenopathy.    Cardiovascular: Normal rate, regular rhythm.  No murmur. DP/radial pulses 2+, cap refill < 2 sec.  Pulmonary/Chest: Effort normal. No respiratory distress. Symmetric expansion.  No crackles or wheezes.  Abdomen: Soft. Bowel sounds are normal. No distension and no mass. There is no hepatosplenomegaly.    Genitourinary:  Deferred  Musculoskeletal: Normal range of motion of lower and upper extremities bilaterally.   Neurological: Alert and oriented to person and place. Exhibits normal muscle tone bilaterally in upper and lower extremities. Gait not assessed.  Skin: Skin is warm, dry and pink.  No rash or evidence of skin infection.   Psychiatric: Mood is flat.      ASSESSMENT AND PLAN:     Tomas Jean-Baptiste is a 24 y.o. 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    1) Rothia mucilaginosa Bacteremia:   - Blood cultures obtained on presentation 3/22/2025 from CVL (both lumens) growing Rothia mucilaginosa    - No differential time to positivity, inconsistent with CLABSI   - Was placed on empiric cefepime and Flagyl upon admission (continue empirically)   - Hospital and Pediatric Heme/Onc has had previous patients with Rothia infections resistant to broad-spectrum antibiotics   - Will attempt to send susceptibilities   - Will obtain repeat cultures to assess for response   - Hemodynamically stable and afebrile, will continue to monitor closely    2) Constipation/Rectal Pain/Pain with Defecation/Hematochezia:  - Concern for  internal hemorrhoids vs abscess/fistula  - Blood culture as above, no gram-negative organisms identified  - Cefepime 2 mg IV every 8 hrs empirically despite cultures growing gram-positive organisms only  - Flagyl 500 mg IV every 12 hrs will narrow coverage upon resolution of rectal pain/rule out abscess/fistula  - Morphine 2 mg IV x 1   - Tylenol every 6 hrs PRN for mild pain  - Morphine 2 mg IV every 3 hrs PRN for moderate-severe pain  - Oxycodone 5-10 mg PO every 4 hrs PRN for moderate-severe pain  - Senna 2 tablets BID, titrate to effect  - Preparation H, apply externally   - Sitz bath PRN  - MRI Pelvis ordered but not obtained as radiology indicating that MRI-RECTUM would need to be ordered as an outpatient on a 3T scanner  - Given that pain is also located in the lower abdomen, we will forego MRI-RECTUM and continue with MRI-PELVIS.  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, received cranial radiation 6/5/2023 to 6/16/2023  - Testicular disease negative at diagnosis  - Several complications throughout therapy to include severe septic shock 12/27/2022 while in Delayed Intensification  - Completed therapy 5/30/2024  - Seen in clinic on 1/15/2025 without any evidence of relapse (clinical/laboratory)  - Reported viral URI symptoms approximately 2.5 weeks prior to presentation   - Interval resolution of viral URI symptoms followed by very acute worsening of flulike symptoms as well as aches and pains  - Seen in clinic 2/27/2025: WBC 1000 cells/uL, Hgb 10.6 g/dL, platelets 53,000 cells/uL, ANC 10, , absolute monocyte count 20   - Precipitous drop of counts over the past month with context of low-grade temperatures and bone pain most consistent with relapsed leukemia   - Admission for Fever and Neutropenia 2/27/2025  -  Double-lumen Broviac line placement, diagnostic bone marrow evaluation to include aspirate and biopsy, flow cytometry and FISH to confirm relapse at bedside 2/28/2025  - Testicular negative at relapse  - CSF negative consistent with CNS1  - Confirmed 13% blasts in hemodilute BMA specimen by flow  - Confirmed BCR-ABL1 fusion in 17% cells by FISH  - Discussions with transplant colleagues regarding planning allogeneic transplant / CAR-T therapy   - After discussing multiple options, proposed to patient a 3-Drug Re-Induction (VCR/PRED/PEG-ASP) + Imatinib followed by blinatumomab  - Met virtually with Dr. 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 Adrian - will begin search for donor     5) Individualized Salvage Therapy, 3-Drug Re-Induction, Day 18:  ** Methotrexate 15 mg IT x 1 on Days 1 (COMPLETE, see separate procedure note)  ** Vincristine 2 mg IV x 1 on Days 1 (COMPLETE), 8 (COMPLETE), 15 (COMPLETE) and 22  ** Prednisone 30 mg/m2/dose = 60 mg PO BID x 28 days   ** PEG-Aspargase 2000 IU/m2 x 1 on Day 4 in OPIC (COMPLETE)   ** Imatinib 400 mg PO QAM and 250 mg PO QHS (started 3/6/2025, given resistence, discontinued)  ** Dasatinib 70 mg PO BID, started 3/22/2025  ** On Pepcid BID while on corticosteroids. However, pharmacist called me to report that pepcid has been known to decrease the efficacy of Dasatinib. Hence, switched to Tums and to be  by 2 hrs around Dasatinib administration.   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:  -  cells/uL, Hgb 7.3 g/dL, platelets 12,000 cells/uL  - /microliters, /microliters, absolute monocyte count 0/microliters,  BLASTS:  0%  - Transfuse irradiated PRBC for Hgb<7 g/dL or symptomatic  - Transfuse irradiated platelets for platelet count of <10,000 cells/uL or symptomatic    - Will transfuse 1 unit of platelets today  - Neutropenic precautions     - CBC daily            7) At Risk for Opportunistic Pulmonary Infection:  - Heavily pretreated  - Bactrim -160 mg PO BID Sat/Sun  - HSV1 + IgG, 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  - Consider levofloxacin PO as outpatient for prolonged neutropenia     9) FEN/GI:  - Regular diet  - IVF at 100 ml/hr  - BMP daily     10) At Risk For Chemotherapy Induced Nausea and Vomiting:  - Zofran 8 mg IV PRN ordered  - Ativan 1 mg IV PRN ordered      11) Hyperglycemia, Steroid Induced:  - Moderate  -  mg/dL today (no sugars > 200 mg/dL)  - Monitor for need for insulin     12) Transaminitis Secondary To Therapy:  - AST: 73 U/L, ALT: 252 U/L 3/22/2025  - Bilirubin total 1.6 mg/dL 3/22/2025  - Will continue to monitor     13) Central Access:  - Double-lumen CVL placed 2/28/2025 by surgery  - Saline flush per protocol    - Will continue to treat through infection for the meantime.    14) Psychosocial:  - Dr. Ortega following     15) Social:    - Referred  to Social Work and financial navigator     Disposition: Remain inpatient for treatment of bacteremia.    Pepe Faye MD  Pediatric Hematology / Oncology  [facility]  Cell.  [phone]  Office. [phone]

## 2025-03-25 NOTE — CARE PLAN
The patient is Watcher - Medium risk of patient condition declining or worsening    Shift Goals  Clinical Goals: monitor for need of blood transfusion, tolerate ABX  Patient Goals: pain control and sleep  Family Goals: not atbedside    Progress made toward(s) clinical / shift goals:    Problem: Pain - Standard  Goal: Alleviation of pain or a reduction in pain to the patient’s comfort goal  Description: Target End Date:  Prior to discharge or change in level of careDocument on Vitals flowsheet1.  Document pain using the appropriate pain scale per order or unit policy2.  Educate and implement non-pharmacologic comfort measures (i.e. relaxation, distraction, massage, cold/heat therapy, etc.)3.  Pain management medications as ordered4.  Reassess pain after pain med administration per policy5.  If opiods administered assess patient's response to pain medication is appropriate per POSS sedation scale6.  Follow pain management plan developed in collaboration with patient and interdisciplinary team (including palliative care or pain specialists if applicable)  Outcome: Progressing     Problem: Fall Risk  Goal: Patient will remain free from falls  Description: Target End Date:  Prior to discharge or change in level of careDocument interventions on the [place] Fall Risk Assessment1.  Assess for fall risk factors2.  Implement fall precautions  Outcome: Progressing       Patient is not progressing towards the following goals:

## 2025-03-25 NOTE — PROGRESS NOTES
Pediatric Hematology/Oncology  Daily Progress Note      Patient Name:  Tomas Jean-Baptiste  : 2000  MRN: [id number]    Location of Service:  [facility]  Date of Service: 3/25/2025  Time: 8:00 AM    Hospital Day: 4    Protocol / Treatment Plan: Individualized Re-Induction chemotherapy, 3 Drug + Tyrosine Kinase Inhibitor, Day 19    SUBJECTIVE:     No acute events overnight.  Afebrile Tmax 98.6 °F. Tomas Roy reports that he is feeling a little bit better than yesterday but still is not feeling well overall.  He denies any headaches, changes in vision or neurologic status changes.  He denies any cough, congestion or shortness of breath.  No complaints of any difficulties with breathing.  He reports that he continues to have some abdominal discomfort which is generalized lower abdominal pain.  He also reports some mild to moderate nausea.  He denies however any vomiting overnight.  There are no complaints of any skin changes or rashes.  No complaints of any generalized aches or pain.  He does have continued pain with defecation.  Not having pain with initiation of defecation but rather with completion of defecation.  He does not report any blood in his stool today.  No other easy bleeding or bruising.  Overall mood is decreased.  Tolerating medications.  No other concerns or complaints at this time.    Review of Systems:     Constitutional: Afebrile, Tmax 98.6 °F.  Still with generalized malaise.  Slightly better than yesterday.  Still no appetite or desire to eat.  Minimal oral intake.  HENT: Negative.  Eyes: Negative for visual changes.  Respiratory: Negative for shortness of breath.  No cough.  Cardiovascular: Negative.  Gastrointestinal: Today, complaining of mild to moderate nausea without vomiting.  Does complain of lower abdominal pain today..  No hematochezia overnight..  Painful defecation again today..  Genitourinary: Negative.  Musculoskeletal: Negative for arm pain  "or leg pain.    Skin: Negative for rash or skin infection.  Neurological: Negative for numbness, tingling, sensory changes, weakness or headaches.    Endo/Heme/Allergies: No bruising/bleeding easily.    Psychiatric/Behavioral: Flat mood.     OBJECTIVE:     Max Temp: Temp (24hrs), Av.7 °C (98 °F), Min:36.2 °C (97.1 °F), Max:37 °C (98.6 °F)    Vitals: /57   Pulse 74   Temp 36.2 °C (97.1 °F) (Temporal)   Resp 18   Ht 1.651 m (5' 5\")   Wt 72.3 kg (159 lb 6.3 oz)   SpO2 97%   BMI 26.52 kg/m²     I/O:   Intake/Output Summary (Last 24 hours) at 3/25/2025 0807  Last data filed at 3/24/2025 1811  Gross per 24 hour   Intake 354 ml   Output 600 ml   Net -246 ml     Labs:   Latest Reference Range & Units 25 01:30   WBC 4.8 - 10.8 K/uL 0.7 (LL)   RBC 4.70 - 6.10 M/uL 2.45 (L)   Hemoglobin 14.0 - 18.0 g/dL 7.0 (L)   Hematocrit 42.0 - 52.0 % 19.7 (L)   MCV 81.4 - 97.8 fL 80.4 (L)   MCH 27.0 - 33.0 pg 28.6   MCHC 32.3 - 36.5 g/dL 35.5   RDW 35.9 - 50.0 fL 37.9   Platelet Count 164 - 446 K/uL 38 (L)   MPV 9.0 - 12.9 fL 9.4   Neutrophils-Polys 44.00 - 72.00 % 48.60   Neutrophils (Absolute) 1.82 - 7.42 K/uL 0.34 (LL)   Lymphocytes 22.00 - 41.00 % 50.50 (H)   Lymphs (Absolute) 1.00 - 4.80 K/uL 0.35 (L)   Monocytes 0.00 - 13.40 % 0.00   Monos (Absolute) 0.00 - 0.85 K/uL 0.00   Eosinophils 0.00 - 6.90 % 0.00   Eos (Absolute) 0.00 - 0.51 K/uL 0.00   Basophils 0.00 - 1.80 % 0.00   Baso (Absolute) 0.00 - 0.12 K/uL 0.00   Metamyelocytes % 0.90   Nucleated RBC 0.00 - 0.20 /100 WBC 0.00   NRBC (Absolute) K/uL 0.00   Plt Estimation  Decreased   Imm. Plt Fraction 0.6 - 13.1 % 1.1   RBC Morphology  Present   Anisocytosis  1+   Microcytosis  1+   Poikilocytosis  1+   Target Cells  1+   Smudge Cells  Moderate   Peripheral Smear Review  see below   Manual Diff Status  PERFORMED   Comment  See Comment   Sodium 135 - 145 mmol/L 134 (L)   Potassium 3.6 - 5.5 mmol/L 3.9   Chloride 96 - 112 mmol/L 105   Co2 20 - 33 mmol/L 22 "   Anion Gap 7.0 - 16.0  7.0   Glucose 65 - 99 mg/dL 125 (H)   Bun 8 - 22 mg/dL 11   Creatinine 0.50 - 1.40 mg/dL 0.45 (L)   GFR (CKD-EPI) >60 mL/min/1.73 m 2 151   Calcium 8.5 - 10.5 mg/dL 6.3 (LL)   Correct Calcium 8.5 - 10.5 mg/dL 7.7 (L)   AST(SGOT) 12 - 45 U/L 56 (H)   ALT(SGPT) 2 - 50 U/L 170 (H)   Alkaline Phosphatase 30 - 99 U/L 86   Total Bilirubin 0.1 - 1.5 mg/dL 1.6 (H)   Albumin 3.2 - 4.9 g/dL 2.2 (L)   Total Protein 6.0 - 8.2 g/dL 3.3 (L)   Globulin 1.9 - 3.5 g/dL 1.1 (L)   A-G Ratio g/dL 2.0   (LL): Data is critically low  (L): Data is abnormally low  (H): Data is abnormally high    Blood cultures obtained 3/22/2025 positive from both lines with no differential time to positivity.  Currently growing gram-positive cocci in clusters    3/24/2025, blood cultures speciated as Rothia mucilaginosa .  Rothia is historically difficult to isolate and speciate.  Hospital and Pediatric Oncology service with previous patients that have had resistant Rothia bacteremias requiring either vancomycin or daptomycin.  Will attempt to get susceptibilities and isolate.  In the meantime, continue with vancomycin.    Physical Exam:    Constitutional: Ill-appearing, not toxic, but also not very improved from yesterday.  HENT: Normocephalic and atraumatic.  No rhinorrhea. Oropharynx is clear and moist.   Eyes: Conjunctivae are normal. EOMI. Non-icteric. Pupils equal and round.  Neck: Normal range of motion of neck, no adenopathy.    Cardiovascular: Normal rate, regular rhythm.  No murmur. DP/radial pulses 2+, cap refill < 2 sec.  Pulmonary/Chest: Effort normal. No respiratory distress. Symmetric expansion.  No crackles or wheezes.  Abdomen: Soft. Bowel sounds are normal. No distension and no mass. There is no hepatosplenomegaly.    Genitourinary:  Deferred  Musculoskeletal: Normal range of motion of lower and upper extremities bilaterally.   Neurological: Alert and oriented to person and place. Exhibits normal muscle tone  bilaterally in upper and lower extremities. Gait not assessed.  Skin: Skin is warm, dry and pink.  No rash or evidence of skin infection.   Psychiatric: Mood is flat.    ASSESSMENT AND PLAN:     Tomas Jean-Baptiste is a 24 y.o. 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    1) Rothia mucilaginosa Bacteremia:   - Blood cultures obtained on presentation 3/22/2025 from CVL (both lumens) growing Rothia mucilaginosa    - No differential time to positivity, inconsistent with CLABSI   - Was placed on empiric cefepime and Flagyl upon admission (continue empirically)   - Hospital and Pediatric Heme/Onc has had previous patients with Rothia infections resistant to broad-spectrum antibiotics   - Will attempt to send susceptibilities   - Will obtain repeat cultures to assess for response   - Hemodynamically stable and afebrile, will continue to monitor closely    2) Constipation/Rectal Pain/Pain with Defecation/Hematochezia:  - Concern for internal hemorrhoids vs abscess/fistula  - Blood culture as above, no gram-negative organisms identified  - Cefepime 2 mg IV every 8 hrs empirically despite cultures growing gram-positive organisms only  - Flagyl 500 mg IV every 12 hrs will narrow coverage upon resolution of rectal pain/rule out abscess/fistula  - Morphine 2 mg IV x 1   - Tylenol every 6 hrs PRN for mild pain  - Morphine 2 mg IV every 3 hrs PRN for moderate-severe pain  - Oxycodone 5-10 mg PO every 4 hrs PRN for moderate-severe pain  - Senna 2 tablets BID, titrate to effect  - Preparation H, apply externally   - Sitz bath PRN  - MRI Pelvis ordered but not obtained as radiology indicating that MRI-RECTUM would need to be ordered as an outpatient on a 3T scanner  - Given that pain is also located in the lower abdomen, we will forego MRI-RECTUM and continue with MRI-PELVIS.  - MRI pelvis today  demonstrated mild diffuse increase signal throughout the pelvic muscles possibly related to inflammation.  No apparent sinus tract or fluid collection in the anus or ischial anal fossa.  -Slight improvement today.  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, received cranial radiation 6/5/2023 to 6/16/2023  - Testicular disease negative at diagnosis  - Several complications throughout therapy to include severe septic shock 12/27/2022 while in Delayed Intensification  - Completed therapy 5/30/2024  - Seen in clinic on 1/15/2025 without any evidence of relapse (clinical/laboratory)  - Reported viral URI symptoms approximately 2.5 weeks prior to presentation   - Interval resolution of viral URI symptoms followed by very acute worsening of flulike symptoms as well as aches and pains  - Seen in clinic 2/27/2025: WBC 1000 cells/uL, Hgb 10.6 g/dL, platelets 53,000 cells/uL, ANC 10, , absolute monocyte count 20   - Precipitous drop of counts over the past month with context of low-grade temperatures and bone pain most consistent with relapsed leukemia   - Admission for Fever and Neutropenia 2/27/2025  - Double-lumen Broviac line placement, diagnostic bone marrow evaluation to include aspirate and biopsy, flow cytometry and FISH to confirm relapse at bedside 2/28/2025  - Testicular negative at relapse  - CSF negative consistent with CNS1  - Confirmed 13% blasts in hemodilute BMA specimen by flow  - Confirmed BCR-ABL1 fusion in 17% cells by FISH  - Discussions with transplant colleagues regarding planning allogeneic transplant / CAR-T therapy   - After discussing multiple options, proposed to patient a 3-Drug Re-Induction (VCR/PRED/PEG-ASP) + Imatinib followed by blinatumomab  - Met virtually with Dr. 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 Adrian - will begin search for donor     5) Individualized Salvage Therapy, 3-Drug Re-Induction, Day 19:  ** Methotrexate 15 mg IT x 1 on Days 1 (COMPLETE, see separate procedure note)  ** Vincristine 2 mg IV x 1 on Days 1 (COMPLETE), 8 (COMPLETE), 15 (COMPLETE) and 22  ** Prednisone 30 mg/m2/dose = 60 mg PO BID x 28 days   ** PEG-Aspargase 2000 IU/m2 x 1 on Day 4 in OPIC (COMPLETE)   ** Imatinib 400 mg PO QAM and 250 mg PO QHS (started 3/6/2025, given resistence, discontinued)  ** Dasatinib 70 mg PO BID, started 3/22/2025  ** On Pepcid BID while on corticosteroids. However, pharmacist called me to report that pepcid has been known to decrease the efficacy of Dasatinib. Hence, switched to Tums and to be  by 2 hrs around Dasatinib administration.   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:  -  cells/uL, Hgb 7.3 g/dL, platelets 38,000 cells/uL  - /microliters, /microliters, absolute monocyte count 0/microliters,  BLASTS: 0%  - Transfuse irradiated PRBC for Hgb<7 g/dL or symptomatic  - Transfuse irradiated platelets for platelet count of <10,000 cells/uL or symptomatic    - No transfusion today  - Neutropenic precautions     - CBC daily            7) At Risk for Opportunistic Pulmonary Infection:  - Heavily pretreated  - Bactrim -160 mg PO BID Sat/Sun  - HSV1 + IgG, 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  - Consider levofloxacin PO as outpatient for prolonged neutropenia     9) FEN/GI:  - Regular diet  - IVF at 100 ml/hr  - BMP daily     10) At Risk For Chemotherapy  Induced Nausea and Vomiting:  - Zofran 8 mg IV PRN ordered  - Ativan 1 mg IV PRN ordered      11) Hyperglycemia, Steroid Induced:  - Moderate  -  mg/dL today (no sugars > 200 mg/dL)  - Monitor for need for insulin     12) Transaminitis Secondary To Therapy: (IMPROVED)  - AST: 56 U/L, ALT: 170 U/L 3/25/2025  - Bilirubin total 1.6 mg/dL 3/25/2025  - Will continue to monitor     13) Central Access:  - Double-lumen CVL placed 2/28/2025 by surgery  - Saline flush per protocol    - Will continue to treat through infection for the meantime.    14) Psychosocial:  - Dr. Ortega following     15) Social:    - Referred  to Social Work and financial navigator     Disposition: Remain inpatient for treatment of bacteremia.    Pepe Faye MD  Pediatric Hematology / Oncology  [facility]  Cell.  [phone]  Office. [phone]

## 2025-03-26 LAB
ANION GAP SERPL CALC-SCNC: 6 MMOL/L (ref 7–16)
BASOPHILS # BLD AUTO: 0 % (ref 0–1.8)
BASOPHILS # BLD: 0 K/UL (ref 0–0.12)
BUN SERPL-MCNC: 11 MG/DL (ref 8–22)
CALCIUM SERPL-MCNC: 6.7 MG/DL (ref 8.5–10.5)
CHLORIDE SERPL-SCNC: 104 MMOL/L (ref 96–112)
CO2 SERPL-SCNC: 25 MMOL/L (ref 20–33)
COMMENT NL1176: NORMAL
CREAT SERPL-MCNC: 0.43 MG/DL (ref 0.5–1.4)
EOSINOPHIL # BLD AUTO: 0 K/UL (ref 0–0.51)
EOSINOPHIL NFR BLD: 0 % (ref 0–6.9)
ERYTHROCYTE [DISTWIDTH] IN BLOOD BY AUTOMATED COUNT: 39.6 FL (ref 35.9–50)
GFR SERPLBLD CREATININE-BSD FMLA CKD-EPI: 153 ML/MIN/1.73 M 2
GLUCOSE SERPL-MCNC: 129 MG/DL (ref 65–99)
HCT VFR BLD AUTO: 20.8 % (ref 42–52)
HGB BLD-MCNC: 7.4 G/DL (ref 14–18)
LYMPHOCYTES # BLD AUTO: 0.59 K/UL (ref 1–4.8)
LYMPHOCYTES NFR BLD: 65.2 % (ref 22–41)
MANUAL DIFF BLD: NORMAL
MCH RBC QN AUTO: 29 PG (ref 27–33)
MCHC RBC AUTO-ENTMCNC: 35.6 G/DL (ref 32.3–36.5)
MCV RBC AUTO: 81.6 FL (ref 81.4–97.8)
MONOCYTES # BLD AUTO: 0 K/UL (ref 0–0.85)
MONOCYTES NFR BLD AUTO: 0 % (ref 0–13.4)
MORPHOLOGY BLD-IMP: NORMAL
NEUTROPHILS # BLD AUTO: 0.31 K/UL (ref 1.82–7.42)
NEUTROPHILS NFR BLD: 34.8 % (ref 44–72)
NRBC # BLD AUTO: 0 K/UL
NRBC BLD-RTO: 0 /100 WBC (ref 0–0.2)
PLATELET # BLD AUTO: 32 K/UL (ref 164–446)
PLATELET BLD QL SMEAR: NORMAL
PLATELETS.RETICULATED NFR BLD AUTO: 2.2 % (ref 0.6–13.1)
PMV BLD AUTO: 9 FL (ref 9–12.9)
POIKILOCYTOSIS BLD QL SMEAR: NORMAL
POTASSIUM SERPL-SCNC: 3.7 MMOL/L (ref 3.6–5.5)
RBC # BLD AUTO: 2.55 M/UL (ref 4.7–6.1)
RBC BLD AUTO: PRESENT
SMUDGE CELLS BLD QL SMEAR: NORMAL
SODIUM SERPL-SCNC: 135 MMOL/L (ref 135–145)
TARGETS BLD QL SMEAR: NORMAL
WBC # BLD AUTO: 0.9 K/UL (ref 4.8–10.8)

## 2025-03-26 PROCEDURE — 700111 HCHG RX REV CODE 636 W/ 250 OVERRIDE (IP): Performed by: PEDIATRICS

## 2025-03-26 PROCEDURE — 85055 RETICULATED PLATELET ASSAY: CPT

## 2025-03-26 PROCEDURE — 99233 SBSQ HOSP IP/OBS HIGH 50: CPT | Performed by: PEDIATRICS

## 2025-03-26 PROCEDURE — 96158 HLTH BHV IVNTJ INDIV 1ST 30: CPT | Performed by: PSYCHOLOGIST

## 2025-03-26 PROCEDURE — 80048 BASIC METABOLIC PNL TOTAL CA: CPT

## 2025-03-26 PROCEDURE — 85027 COMPLETE CBC AUTOMATED: CPT

## 2025-03-26 PROCEDURE — 770004 HCHG ROOM/CARE - ONCOLOGY PRIVATE *

## 2025-03-26 PROCEDURE — 700105 HCHG RX REV CODE 258: Performed by: PEDIATRICS

## 2025-03-26 PROCEDURE — 700102 HCHG RX REV CODE 250 W/ 637 OVERRIDE(OP): Performed by: PEDIATRICS

## 2025-03-26 PROCEDURE — 85007 BL SMEAR W/DIFF WBC COUNT: CPT

## 2025-03-26 PROCEDURE — 96159 HLTH BHV IVNTJ INDIV EA ADDL: CPT | Performed by: PSYCHOLOGIST

## 2025-03-26 PROCEDURE — A9270 NON-COVERED ITEM OR SERVICE: HCPCS | Performed by: PEDIATRICS

## 2025-03-26 RX ADMIN — CHLORHEXIDINE GLUCONATE, 0.12% ORAL RINSE 15 ML: 1.2 SOLUTION DENTAL at 17:18

## 2025-03-26 RX ADMIN — HYDROCORTISONE: 1 CREAM TOPICAL at 17:19

## 2025-03-26 RX ADMIN — ANTACID TABLETS 1000 MG: 500 TABLET, CHEWABLE ORAL at 21:29

## 2025-03-26 RX ADMIN — CHLORHEXIDINE GLUCONATE, 0.12% ORAL RINSE 15 ML: 1.2 SOLUTION DENTAL at 09:15

## 2025-03-26 RX ADMIN — METRONIDAZOLE 500 MG: 5 INJECTION, SOLUTION INTRAVENOUS at 07:04

## 2025-03-26 RX ADMIN — LORAZEPAM 1 MG: 2 INJECTION INTRAMUSCULAR; INTRAVENOUS at 08:06

## 2025-03-26 RX ADMIN — CEFEPIME 2 G: 2 INJECTION, POWDER, FOR SOLUTION INTRAVENOUS at 21:34

## 2025-03-26 RX ADMIN — DASATINIB 70 MG: 70 TABLET ORAL at 17:19

## 2025-03-26 RX ADMIN — Medication 1 APPLICATOR: at 17:18

## 2025-03-26 RX ADMIN — CHLORHEXIDINE GLUCONATE, 0.12% ORAL RINSE 15 ML: 1.2 SOLUTION DENTAL at 21:29

## 2025-03-26 RX ADMIN — VANCOMYCIN HYDROCHLORIDE 1500 MG: 5 INJECTION, POWDER, LYOPHILIZED, FOR SOLUTION INTRAVENOUS at 17:21

## 2025-03-26 RX ADMIN — ONDANSETRON 8 MG: 2 INJECTION INTRAMUSCULAR; INTRAVENOUS at 13:20

## 2025-03-26 RX ADMIN — PREDNISONE 60 MG: 50 TABLET ORAL at 21:29

## 2025-03-26 RX ADMIN — VANCOMYCIN HYDROCHLORIDE 1500 MG: 5 INJECTION, POWDER, LYOPHILIZED, FOR SOLUTION INTRAVENOUS at 06:16

## 2025-03-26 RX ADMIN — LORAZEPAM 1 MG: 2 INJECTION INTRAMUSCULAR; INTRAVENOUS at 21:29

## 2025-03-26 RX ADMIN — OXYCODONE HYDROCHLORIDE 5 MG: 5 TABLET ORAL at 15:05

## 2025-03-26 RX ADMIN — ANTACID TABLETS 1000 MG: 500 TABLET, CHEWABLE ORAL at 09:15

## 2025-03-26 RX ADMIN — Medication 1 APPLICATOR: at 06:16

## 2025-03-26 RX ADMIN — VANCOMYCIN HYDROCHLORIDE 1500 MG: 5 INJECTION, POWDER, LYOPHILIZED, FOR SOLUTION INTRAVENOUS at 00:16

## 2025-03-26 RX ADMIN — PREDNISONE 60 MG: 50 TABLET ORAL at 09:15

## 2025-03-26 RX ADMIN — CEFEPIME 2 G: 2 INJECTION, POWDER, FOR SOLUTION INTRAVENOUS at 06:16

## 2025-03-26 RX ADMIN — DASATINIB 70 MG: 70 TABLET ORAL at 06:16

## 2025-03-26 RX ADMIN — PANTOPRAZOLE SODIUM 40 MG: 40 INJECTION, POWDER, FOR SOLUTION INTRAVENOUS at 06:16

## 2025-03-26 RX ADMIN — CHLORHEXIDINE GLUCONATE, 0.12% ORAL RINSE 15 ML: 1.2 SOLUTION DENTAL at 13:34

## 2025-03-26 RX ADMIN — CEFEPIME 2 G: 2 INJECTION, POWDER, FOR SOLUTION INTRAVENOUS at 13:21

## 2025-03-26 RX ADMIN — HYDROCORTISONE: 1 CREAM TOPICAL at 06:00

## 2025-03-26 RX ADMIN — VANCOMYCIN HYDROCHLORIDE 1500 MG: 5 INJECTION, POWDER, LYOPHILIZED, FOR SOLUTION INTRAVENOUS at 13:26

## 2025-03-26 RX ADMIN — SODIUM CHLORIDE: 9 INJECTION, SOLUTION INTRAVENOUS at 14:59

## 2025-03-26 RX ADMIN — ONDANSETRON 8 MG: 2 INJECTION INTRAMUSCULAR; INTRAVENOUS at 06:15

## 2025-03-26 ASSESSMENT — PAIN DESCRIPTION - PAIN TYPE
TYPE: ACUTE PAIN

## 2025-03-26 NOTE — PROGRESS NOTES
Pharmacy Vancomycin Kinetics Note for 3/25/2025     24 y.o. male on Vancomycin day # 3     Vancomycin Indication (Two level): Sepsis (goal trough 15-20)    Provider specified end date: 03/25/25    Active Antibiotics (From admission, onward)      Ordered     Ordering Provider       e Mar 25, 2025  7:50 PM    03/25/25 1950  vancomycin (Vancocin) 1,500 mg in  mL IVPB  (vancomycin (VANCOCIN) IV (LD + Maintenance))  EVERY 6 HOURS         Alyce Duenas M.D.       e Mar 25, 2025 10:38 AM    03/25/25 1038  MD Alert...Vancomycin per Pharmacy  PHARMACY TO DOSE        Question Answer Comment   Indication(s) for vancomycin? Line infection    Indication(s) for vancomycin? Unknown source of infection    Indication(s) for vancomycin? Other (comments)        Pepe Faye M.D.       Sat Mar 22, 2025  4:57 PM    03/22/25 1657  sulfamethoxazole-trimethoprim (Bactrim DS) 800-160 MG tablet 1 Tablet  2 times per day on Sunday Saturday         Alyce Duenas M.D.       Sat Mar 22, 2025  3:36 PM    03/22/25 1536  cefepime (Maxipime) 2 g in  mL IVPB  EVERY 8 HOURS        Note to Pharmacy: First Dose STAT please    Alyce Duenas M.D.    03/22/25 1536  metroNIDAZOLE (Flagyl) IVPB 500 mg  EVERY 12 HOURS        Note to Pharmacy: First dose STAT please    Alyce Duenas M.D.            Dosing Weight: 72.3 kg (159 lb 6.3 oz)      Admission History: Admitted on 3/22/2025 for Acute lymphoblastic leukemia (ALL) in relapse ([facility]) [C91.02]  Pertinent history: Pt with PMH of Ph+ B-ALL admitted for rectal bleeding. Cefepime and Flagyl initiated as pt neutropenic. Pt on maintenance dasatinib, last chemo received 3/6/25. Vancomycin added on with concerns for line infection.    Allergies:     Amoxicillin     Pertinent cultures to date:     Results       Procedure Component Value Units Date/Time    BLOOD CULTURE [[id number]] Collected: 03/25/25 6916    Order Status: Sent Specimen: Blood from Line Updated: 03/25/25 7835    BLOOD CULTURE  "[[id number]] Collected: 25 1744    Order Status: Sent Specimen: Blood from Line Updated: 25 1823    Blood Culture [[id number]]  (Abnormal) Collected: 25 1713    Order Status: Completed Specimen: Blood from Line Updated: 25 1036     Significant Indicator POS     Source BLD     Site LINE     Culture Result Growth detected by automated blood culture system. 08:23      Rothia mucilaginosa    Blood Culture [[id number]]  (Abnormal) Collected: 25 1713    Order Status: Completed Specimen: Blood from Line Updated: 25 1036     Significant Indicator POS     Source BLD     Site LINE     Culture Result Growth detected by automated blood culture system. 08:22      Rothia mucilaginosa    MRSA By PCR (Amp) [[id number]] Collected: 25 0932    Order Status: Completed Specimen: Respirate from Nares Updated: 25 1657     MRSA by PCR Negative    Blood Culture [[id number]]     Order Status: Canceled Specimen: Blood from Line     Blood Culture [[id number]]     Order Status: Canceled Specimen: Blood from Peripheral             Labs:     Estimated Creatinine Clearance: 212.6 mL/min (A) (by C-G formula based on SCr of 0.47 mg/dL (L)).  Recent Labs     25  0938 25  0210 25  0130   WBC 0.6* 0.9* 0.7*   NEUTSPOLYS 14.30* 24.00* 48.60   BANDSSTABS  --  1.40  --      Recent Labs     25  0938 25  0130 25  0929   BUN 15 11 11   CREATININE 0.58 0.45* 0.47*   ALBUMIN  --  2.2*  --        Intake/Output Summary (Last 24 hours) at 3/25/2025 1952  Last data filed at 3/25/2025 1236  Gross per 24 hour   Intake 304 ml   Output 500 ml   Net -196 ml      /77   Pulse 99   Temp 37.1 °C (98.7 °F) (Oral)   Resp 18   Ht 1.651 m (5' 5\")   Wt 72.3 kg (159 lb 6.3 oz)   SpO2 95%  Temp (24hrs), Av.7 °C (98 °F), Min:36.2 °C (97.1 °F), Max:37.1 °C (98.7 °F)      List concerns for Vancomycin clearance:     Abnormal LFTs    Pharmacokinetics:     Trough kinetics:   Recent Labs     " 03/24/25  1408 03/24/25  1717 03/25/25  1744   VANCOTROUGH  --    < > 9.9*   VANCOPEAK 14.8*  --   --     < > = values in this interval not displayed.     Using linear AUC calculations:   3/24 trough of 8.7, calculated  mg*hr/L  3/25 trough of 9.9, linearly calculated AUC is now 308 mg*hr/L  Proportional new dose calculated via [(4000 mg total daily vanco) x (500 mg*hr/L goal AUC)] / (308 mg*hr/L calculated AUC) = ~6500 mg/day vanco         A/P:     -  Vancomycin dose: Increased to 1500 mg q6h starting at 0000    -  Next vancomycin level(s):    -TBD if course is extended    - Calculated AUC (from trough): 308    -  Comments: Calculated AUC subtherapeutic, dose increased to 1500 mg q6h, Vancomycin order expiring in 2 days, extend if clinically indicated and re-evaluate levels.     Jamia Montero, PharmD

## 2025-03-26 NOTE — CARE PLAN
The patient is Watcher - Medium risk of patient condition declining or worsening    Shift Goals  Clinical Goals: monitor labs, pain and nausea control  Patient Goals: rest  Family Goals: not present    Progress made toward(s) clinical / shift goals:          Problem: Knowledge Deficit - Standard  Goal: Patient and family/care givers will demonstrate understanding of plan of care, disease process/condition, diagnostic tests and medications  Outcome: Progressing     Problem: Pain - Standard  Goal: Alleviation of pain or a reduction in pain to the patient’s comfort goal  Outcome: Progressing     Problem: Fall Risk  Goal: Patient will remain free from falls  Outcome: Progressing

## 2025-03-26 NOTE — CARE PLAN
The patient is Stable - Low risk of patient condition declining or worsening    Shift Goals  Clinical Goals: Monitor labs, transfuse as necessary, IV abx  Patient Goals: Pain/nausea control, sleep  Family Goals: not atbedside    Progress made toward(s) clinical / shift goals:     Problem: Knowledge Deficit - Standard  Goal: Patient and family/care givers will demonstrate understanding of plan of care, disease process/condition, diagnostic tests and medications  Outcome: Progressing     Problem: Pain - Standard  Goal: Alleviation of pain or a reduction in pain to the patient’s comfort goal  Outcome: Progressing     Problem: Fall Risk  Goal: Patient will remain free from falls  Outcome: Progressing

## 2025-03-27 LAB
ANION GAP SERPL CALC-SCNC: 8 MMOL/L (ref 7–16)
BASOPHILS # BLD AUTO: 0 % (ref 0–1.8)
BASOPHILS # BLD: 0 K/UL (ref 0–0.12)
BUN SERPL-MCNC: 11 MG/DL (ref 8–22)
CALCIUM SERPL-MCNC: 7.3 MG/DL (ref 8.5–10.5)
CHLORIDE SERPL-SCNC: 103 MMOL/L (ref 96–112)
CO2 SERPL-SCNC: 24 MMOL/L (ref 20–33)
COMMENT NL1176: NORMAL
CREAT SERPL-MCNC: 0.42 MG/DL (ref 0.5–1.4)
EOSINOPHIL # BLD AUTO: 0 K/UL (ref 0–0.51)
EOSINOPHIL NFR BLD: 0 % (ref 0–6.9)
ERYTHROCYTE [DISTWIDTH] IN BLOOD BY AUTOMATED COUNT: 40.6 FL (ref 35.9–50)
GFR SERPLBLD CREATININE-BSD FMLA CKD-EPI: 154 ML/MIN/1.73 M 2
GLUCOSE SERPL-MCNC: 170 MG/DL (ref 65–99)
HCT VFR BLD AUTO: 20.6 % (ref 42–52)
HGB BLD-MCNC: 7.1 G/DL (ref 14–18)
LYMPHOCYTES # BLD AUTO: 0.15 K/UL (ref 1–4.8)
LYMPHOCYTES NFR BLD: 18.7 % (ref 22–41)
MANUAL DIFF BLD: NORMAL
MCH RBC QN AUTO: 28.4 PG (ref 27–33)
MCHC RBC AUTO-ENTMCNC: 34.5 G/DL (ref 32.3–36.5)
MCV RBC AUTO: 82.4 FL (ref 81.4–97.8)
MONOCYTES # BLD AUTO: 0.01 K/UL (ref 0–0.85)
MONOCYTES NFR BLD AUTO: 1.6 % (ref 0–13.4)
MORPHOLOGY BLD-IMP: NORMAL
NEUTROPHILS # BLD AUTO: 0.64 K/UL (ref 1.82–7.42)
NEUTROPHILS NFR BLD: 79.7 % (ref 44–72)
NRBC # BLD AUTO: 0 K/UL
NRBC BLD-RTO: 0 /100 WBC (ref 0–0.2)
PLATELET # BLD AUTO: 31 K/UL (ref 164–446)
PLATELET BLD QL SMEAR: NORMAL
PLATELETS.RETICULATED NFR BLD AUTO: 4.3 % (ref 0.6–13.1)
PMV BLD AUTO: 9.9 FL (ref 9–12.9)
POIKILOCYTOSIS BLD QL SMEAR: NORMAL
POTASSIUM SERPL-SCNC: 3.6 MMOL/L (ref 3.6–5.5)
RBC # BLD AUTO: 2.5 M/UL (ref 4.7–6.1)
RBC BLD AUTO: PRESENT
SMUDGE CELLS BLD QL SMEAR: NORMAL
SODIUM SERPL-SCNC: 135 MMOL/L (ref 135–145)
TARGETS BLD QL SMEAR: NORMAL
VANCOMYCIN PEAK SERPL-MCNC: 19.4 UG/ML (ref 20–40)
VANCOMYCIN TROUGH SERPL-MCNC: 11.9 UG/ML (ref 10–20)
WBC # BLD AUTO: 0.8 K/UL (ref 4.8–10.8)

## 2025-03-27 PROCEDURE — A9270 NON-COVERED ITEM OR SERVICE: HCPCS | Performed by: PEDIATRICS

## 2025-03-27 PROCEDURE — 770004 HCHG ROOM/CARE - ONCOLOGY PRIVATE *

## 2025-03-27 PROCEDURE — 700111 HCHG RX REV CODE 636 W/ 250 OVERRIDE (IP): Performed by: PEDIATRICS

## 2025-03-27 PROCEDURE — 700102 HCHG RX REV CODE 250 W/ 637 OVERRIDE(OP): Performed by: PEDIATRICS

## 2025-03-27 PROCEDURE — 85055 RETICULATED PLATELET ASSAY: CPT

## 2025-03-27 PROCEDURE — 80202 ASSAY OF VANCOMYCIN: CPT | Mod: 91

## 2025-03-27 PROCEDURE — 99233 SBSQ HOSP IP/OBS HIGH 50: CPT | Performed by: PEDIATRICS

## 2025-03-27 PROCEDURE — 80048 BASIC METABOLIC PNL TOTAL CA: CPT

## 2025-03-27 PROCEDURE — 700105 HCHG RX REV CODE 258: Performed by: PEDIATRICS

## 2025-03-27 PROCEDURE — 85007 BL SMEAR W/DIFF WBC COUNT: CPT

## 2025-03-27 PROCEDURE — 82652 VIT D 1 25-DIHYDROXY: CPT

## 2025-03-27 PROCEDURE — 85027 COMPLETE CBC AUTOMATED: CPT

## 2025-03-27 RX ORDER — VITAMIN B COMPLEX
1000 TABLET ORAL DAILY
Status: DISCONTINUED | OUTPATIENT
Start: 2025-03-27 | End: 2025-04-09 | Stop reason: HOSPADM

## 2025-03-27 RX ADMIN — ANTACID TABLETS 1000 MG: 500 TABLET, CHEWABLE ORAL at 21:07

## 2025-03-27 RX ADMIN — CHLORHEXIDINE GLUCONATE, 0.12% ORAL RINSE 15 ML: 1.2 SOLUTION DENTAL at 17:22

## 2025-03-27 RX ADMIN — SODIUM CHLORIDE: 9 INJECTION, SOLUTION INTRAVENOUS at 22:36

## 2025-03-27 RX ADMIN — CEFEPIME 2 G: 2 INJECTION, POWDER, FOR SOLUTION INTRAVENOUS at 05:20

## 2025-03-27 RX ADMIN — PREDNISONE 60 MG: 50 TABLET ORAL at 09:28

## 2025-03-27 RX ADMIN — SCOPOLAMINE 1 PATCH: 1.5 PATCH, EXTENDED RELEASE TRANSDERMAL at 09:28

## 2025-03-27 RX ADMIN — VANCOMYCIN HYDROCHLORIDE 1500 MG: 5 INJECTION, POWDER, LYOPHILIZED, FOR SOLUTION INTRAVENOUS at 14:05

## 2025-03-27 RX ADMIN — VANCOMYCIN HYDROCHLORIDE 1500 MG: 5 INJECTION, POWDER, LYOPHILIZED, FOR SOLUTION INTRAVENOUS at 21:07

## 2025-03-27 RX ADMIN — Medication 1000 UNITS: at 09:28

## 2025-03-27 RX ADMIN — OXYCODONE HYDROCHLORIDE 10 MG: 5 TABLET ORAL at 22:24

## 2025-03-27 RX ADMIN — ANTACID TABLETS 1000 MG: 500 TABLET, CHEWABLE ORAL at 09:29

## 2025-03-27 RX ADMIN — CEFEPIME 2 G: 2 INJECTION, POWDER, FOR SOLUTION INTRAVENOUS at 21:06

## 2025-03-27 RX ADMIN — Medication 1 APPLICATOR: at 17:22

## 2025-03-27 RX ADMIN — CHLORHEXIDINE GLUCONATE, 0.12% ORAL RINSE 15 ML: 1.2 SOLUTION DENTAL at 14:08

## 2025-03-27 RX ADMIN — ONDANSETRON 8 MG: 2 INJECTION INTRAMUSCULAR; INTRAVENOUS at 21:10

## 2025-03-27 RX ADMIN — ONDANSETRON 8 MG: 2 INJECTION INTRAMUSCULAR; INTRAVENOUS at 05:09

## 2025-03-27 RX ADMIN — CHLORHEXIDINE GLUCONATE, 0.12% ORAL RINSE 15 ML: 1.2 SOLUTION DENTAL at 09:50

## 2025-03-27 RX ADMIN — ONDANSETRON 8 MG: 2 INJECTION INTRAMUSCULAR; INTRAVENOUS at 13:58

## 2025-03-27 RX ADMIN — VANCOMYCIN HYDROCHLORIDE 1500 MG: 5 INJECTION, POWDER, LYOPHILIZED, FOR SOLUTION INTRAVENOUS at 05:27

## 2025-03-27 RX ADMIN — PANTOPRAZOLE SODIUM 40 MG: 40 INJECTION, POWDER, FOR SOLUTION INTRAVENOUS at 05:10

## 2025-03-27 RX ADMIN — CEFEPIME 2 G: 2 INJECTION, POWDER, FOR SOLUTION INTRAVENOUS at 14:08

## 2025-03-27 RX ADMIN — PREDNISONE 60 MG: 50 TABLET ORAL at 21:11

## 2025-03-27 RX ADMIN — DASATINIB 70 MG: 70 TABLET ORAL at 17:22

## 2025-03-27 RX ADMIN — HYDROCORTISONE: 1 CREAM TOPICAL at 17:23

## 2025-03-27 RX ADMIN — VANCOMYCIN HYDROCHLORIDE 1500 MG: 5 INJECTION, POWDER, LYOPHILIZED, FOR SOLUTION INTRAVENOUS at 00:04

## 2025-03-27 RX ADMIN — Medication 1 APPLICATOR: at 05:10

## 2025-03-27 RX ADMIN — DASATINIB 70 MG: 70 TABLET ORAL at 05:10

## 2025-03-27 RX ADMIN — LORAZEPAM 1 MG: 2 INJECTION INTRAMUSCULAR; INTRAVENOUS at 09:29

## 2025-03-27 RX ADMIN — HYDROCORTISONE: 1 CREAM TOPICAL at 05:11

## 2025-03-27 RX ADMIN — LORAZEPAM 1 MG: 2 INJECTION INTRAMUSCULAR; INTRAVENOUS at 22:25

## 2025-03-27 RX ADMIN — CHLORHEXIDINE GLUCONATE, 0.12% ORAL RINSE 15 ML: 1.2 SOLUTION DENTAL at 21:11

## 2025-03-27 RX ADMIN — OXYCODONE HYDROCHLORIDE 5 MG: 5 TABLET ORAL at 14:29

## 2025-03-27 ASSESSMENT — PAIN DESCRIPTION - PAIN TYPE
TYPE: ACUTE PAIN

## 2025-03-27 NOTE — CARE PLAN
The patient is Watcher - Medium risk of patient condition declining or worsening    Shift Goals  Clinical Goals: monitor labs, pain control  Patient Goals: rest, pain control, monitor labs  Family Goals: not present    Progress made toward(s) clinical / shift goals:      Problem: Knowledge Deficit - Standard  Goal: Patient and family/care givers will demonstrate understanding of plan of care, disease process/condition, diagnostic tests and medications  Outcome: Progressing     Problem: Pain - Standard  Goal: Alleviation of pain or a reduction in pain to the patient’s comfort goal  Outcome: Progressing

## 2025-03-27 NOTE — PROGRESS NOTES
Pharmacy Vancomycin Kinetics Note for 3/27/2025     24 y.o. male on Vancomycin day # 5     Vancomycin Indication (Two level): Sepsis (goal trough 15-20)    Provider specified end date: 03/25/25    Active Antibiotics (From admission, onward)      Ordered     Ordering Provider       Thu Mar 27, 2025  3:49 PM    03/27/25 1549  MD Alert...Vancomycin per Pharmacy  PHARMACY TO DOSE        Question:  Indication(s) for vancomycin?  Answer:  Other (comments)  Comment:  rothia bacteremia    Pepe Faye M.D.       Tue Mar 25, 2025  7:50 PM    03/25/25 1950  vancomycin (Vancocin) 1,500 mg in  mL IVPB  (vancomycin (VANCOCIN) IV (LD + Maintenance))  EVERY 6 HOURS         Pepe Faye M.D.       Sat Mar 22, 2025  4:57 PM    03/22/25 1657  sulfamethoxazole-trimethoprim (Bactrim DS) 800-160 MG tablet 1 Tablet  2 times per day on Sunday Saturday         Alyce Duenas M.D.       Sat Mar 22, 2025  3:36 PM    03/22/25 1536  cefepime (Maxipime) 2 g in  mL IVPB  EVERY 8 HOURS        Note to Pharmacy: First Dose STAT please    Alyce Duenas M.D.            Dosing Weight: 72.3 kg (159 lb 6.3 oz)      Admission History: Admitted on 3/22/2025 for Acute lymphoblastic leukemia (ALL) in relapse ([facility]) [C91.02]  Pertinent history: Pt with PMH of Ph+ B-ALL admitted for rectal bleeding. Cefepime and Flagyl initiated as pt neutropenic. Pt on maintenance dasatinib, last chemo received 3/6/25. Vancomycin added on with concerns for line infection.    Allergies:     Amoxicillin     Pertinent cultures to date:     Results       Procedure Component Value Units Date/Time    Blood Culture [[id number]]  (Abnormal) Collected: 03/22/25 1713    Order Status: Completed Specimen: Blood from Line Updated: 03/27/25 0949     Significant Indicator POS     Source BLD     Site LINE     Culture Result Growth detected by automated blood culture system. 08:22  Gram Stain: Gram positive cocci in clusters.        Rothia mucilaginosa  Sent to Reference  Laboratory for susceptibilities.      BLOOD CULTURE [[id number]] Collected: 03/25/25 1744    Order Status: Completed Specimen: Blood from Line Updated: 03/25/25 2008     Significant Indicator NEG     Source BLD     Site LINE     Culture Result No Growth  Note: Blood cultures are incubated for 5 days and  are monitored continuously.Positive blood cultures  are called to the RN and reported as soon as  they are identified.      BLOOD CULTURE [[id number]] Collected: 03/25/25 1744    Order Status: Completed Specimen: Blood from Line Updated: 03/25/25 2008     Significant Indicator NEG     Source BLD     Site Line     Culture Result No Growth  Note: Blood cultures are incubated for 5 days and  are monitored continuously.Positive blood cultures  are called to the RN and reported as soon as  they are identified.      Blood Culture [[id number]]  (Abnormal) Collected: 03/22/25 1713    Order Status: Completed Specimen: Blood from Line Updated: 03/24/25 1036     Significant Indicator POS     Source BLD     Site LINE     Culture Result Growth detected by automated blood culture system. 08:23      Rothia mucilaginosa    MRSA By PCR (Amp) [[id number]] Collected: 03/23/25 0932    Order Status: Completed Specimen: Respirate from Nares Updated: 03/23/25 1657     MRSA by PCR Negative    Blood Culture [[id number]]     Order Status: Canceled Specimen: Blood from Line     Blood Culture [[id number]]     Order Status: Canceled Specimen: Blood from Peripheral             Labs:     Estimated Creatinine Clearance: 237.9 mL/min (A) (by C-G formula based on SCr of 0.42 mg/dL (L)).  Recent Labs     03/25/25  0130 03/26/25  0021 03/27/25  0005   WBC 0.7* 0.9* 0.8*   NEUTSPOLYS 48.60 34.80* 79.70*     Recent Labs     03/25/25  0130 03/25/25  0929 03/26/25  0021 03/27/25  0945   BUN 11 11 11 11   CREATININE 0.45* 0.47* 0.43* 0.42*   ALBUMIN 2.2*  --   --   --        Intake/Output Summary (Last 24 hours) at 3/27/2025 1551  Last data filed at 3/27/2025  "0600  Gross per 24 hour   Intake --   Output 1400 ml   Net -1400 ml      /75   Pulse 93   Temp 36.6 °C (97.8 °F) (Oral)   Resp 16   Ht 1.651 m (5' 5\")   Wt 72.3 kg (159 lb 6.3 oz)   SpO2 98%  Temp (24hrs), Av.8 °C (98.2 °F), Min:36.5 °C (97.7 °F), Max:36.9 °C (98.5 °F)      List concerns for Vancomycin clearance:     Abnormal LFTs    Pharmacokinetics:       Two level kinetics:   Ke (hr ^-1): 0.2444  Half life: 2.8  Vd: Steady state Vd : 45.312  Calculated AUC: 535 mg·hr/L    Trough kinetics:   Recent Labs     25  0945 25  1145   VANCOTROUGH  --  11.9   VANCOPEAK 19.4*  --        A/P:     -  Vancomycin dose: continue 1500 mg q6h    -  Next vancomycin level(s):    -TBD, in ~5 days or sooner if clinically relivant    -  Comments: renal function remains stable. AUC is within goal. No dose changes.     Brandy Larsen, PharmD    "

## 2025-03-27 NOTE — PROGRESS NOTES
Pediatric Hematology/Oncology  Daily Progress Note      Patient Name:  Tomas Jean-Baptiste  : 2000  MRN: [id number]    Location of Service:  [facility]  Date of Service: 3/26/2025  Time: 2:00 PM    Hospital Day: 5    Protocol / Treatment Plan: Individualized Re-Induction chemotherapy, 3 Drug + Tyrosine Kinase Inhibitor, Day 20    SUBJECTIVE:     No acute events overnight.  Tmax 98.5 °F.  Overall, Tomas Roy reports that he is feeling slightly better however he is still feeling pretty miserable.  He reports that he still has some significant abdominal discomfort and pain in the lower abdomen.  He reports that the pain is better but still not very tolerable.  He has been getting pain medications for the pain which improves upon administration.  He denies any vomiting but does report that he continues to have some nausea.  No complaints of any new signs or symptoms of illness to include headaches, changes in vision or neurologic status changes.  No complaints of any shortness of breath or difficulty breathing.  No cough. Tomas Roy has not had any skin changes or rashes.  No easy bruising or bleeding.  No other concerns or complaints at this time.    Review of Systems:     Constitutional: Afebrile, Tmax 98.5 °F.  Clinically is slightly better but still with considerable fatigue and malaise.  Still with pain. Minimal oral intake.  HENT: Negative.  Eyes: Negative for visual changes.  Respiratory: Negative for shortness of breath.  No cough.  Cardiovascular: Negative.  Gastrointestinal: Today, complaining of mild to moderate nausea without vomiting.  Does complain of lower abdominal pain again today.  No hematochezia overnight.  Defecation remains painful albeit slightly improved.  Genitourinary: Negative.  Musculoskeletal: Negative for arm pain or leg pain.    Skin: Negative for rash or skin infection.  Neurological: Negative for numbness, tingling, sensory changes,  "weakness or headaches.    Endo/Heme/Allergies: No bruising/bleeding easily.    Psychiatric/Behavioral: Improved mood.     OBJECTIVE:     Max Temp: Temp (24hrs), Av.7 °C (98.1 °F), Min:36.5 °C (97.7 °F), Max:36.9 °C (98.5 °F)    Vitals: /70   Pulse 83   Temp 36.5 °C (97.7 °F) (Oral)   Resp 16   Ht 1.651 m (5' 5\")   Wt 72.3 kg (159 lb 6.3 oz)   SpO2 98%   BMI 26.52 kg/m²     I/O:   Intake/Output Summary (Last 24 hours) at 3/27/2025 0029  Last data filed at 3/26/2025 1530  Gross per 24 hour   Intake --   Output 1000 ml   Net -1000 ml     Labs:   Latest Reference Range & Units 25 00:21   WBC 4.8 - 10.8 K/uL 0.9 (LL)   RBC 4.70 - 6.10 M/uL 2.55 (L)   Hemoglobin 14.0 - 18.0 g/dL 7.4 (L)   Hematocrit 42.0 - 52.0 % 20.8 (L)   MCV 81.4 - 97.8 fL 81.6   MCH 27.0 - 33.0 pg 29.0   MCHC 32.3 - 36.5 g/dL 35.6   RDW 35.9 - 50.0 fL 39.6   Platelet Count 164 - 446 K/uL 32 (L)   MPV 9.0 - 12.9 fL 9.0   Neutrophils-Polys 44.00 - 72.00 % 34.80 (L)   Neutrophils (Absolute) 1.82 - 7.42 K/uL 0.31 (LL)   Lymphocytes 22.00 - 41.00 % 65.20 (H)   Lymphs (Absolute) 1.00 - 4.80 K/uL 0.59 (L)   Monocytes 0.00 - 13.40 % 0.00   Monos (Absolute) 0.00 - 0.85 K/uL 0.00   Eosinophils 0.00 - 6.90 % 0.00   Eos (Absolute) 0.00 - 0.51 K/uL 0.00   Basophils 0.00 - 1.80 % 0.00   Baso (Absolute) 0.00 - 0.12 K/uL 0.00   Nucleated RBC 0.00 - 0.20 /100 WBC 0.00   NRBC (Absolute) K/uL 0.00   Plt Estimation  Decreased   Imm. Plt Fraction 0.6 - 13.1 % 2.2   RBC Morphology  Present   Poikilocytosis  1+   Target Cells  1+   Smudge Cells  Moderate   Peripheral Smear Review  see below   Manual Diff Status  PERFORMED   Comment  See Comment   Sodium 135 - 145 mmol/L 135   Potassium 3.6 - 5.5 mmol/L 3.7   Chloride 96 - 112 mmol/L 104   Co2 20 - 33 mmol/L 25   Anion Gap 7.0 - 16.0  6.0 (L)   Glucose 65 - 99 mg/dL 129 (H)   Bun 8 - 22 mg/dL 11   Creatinine 0.50 - 1.40 mg/dL 0.43 (L)   GFR (CKD-EPI) >60 mL/min/1.73 m 2 153   Calcium 8.5 - 10.5 mg/dL " 6.7 (LL)   (LL): Data is critically low  (L): Data is abnormally low  (H): Data is abnormally high    Blood cultures obtained 3/22/2025 positive from both lines with no differential time to positivity.  Currently growing gram-positive cocci in clusters    3/24/2025, blood cultures speciated as Rothia mucilaginosa .  Rothia is historically difficult to isolate and speciate.  Hospital and Pediatric Oncology service with previous patients that have had resistant Rothia bacteremias requiring either vancomycin or daptomycin.  Will attempt to get susceptibilities and isolate.  In the meantime, continue with vancomycin.    3/25/2025 blood cultures NGTD    Physical Exam:    Constitutional: Ill-appearing, not toxic, improved from yesterday but only slightly.  HENT: Normocephalic and atraumatic.  No rhinorrhea. Oropharynx is clear and moist.   Eyes: Conjunctivae are normal. EOMI. Non-icteric. Pupils equal and round.  Neck: Normal range of motion of neck, no adenopathy.    Cardiovascular: Normal rate, regular rhythm.  No murmur. DP/radial pulses 2+, cap refill < 2 sec.  Pulmonary/Chest: Effort normal. No respiratory distress. Symmetric expansion.  No crackles or wheezes.  Abdomen: Soft. Bowel sounds are normal. No distension and no mass. There is no hepatosplenomegaly.    Genitourinary:  Deferred  Musculoskeletal: Normal range of motion of lower and upper extremities bilaterally.   Neurological: Alert and oriented to person and place. Exhibits normal muscle tone bilaterally in upper and lower extremities. Gait not assessed.  Skin: Skin is warm, dry and pink.  No rash or evidence of skin infection.   Psychiatric: Mood is flat.    ASSESSMENT AND PLAN:     Tomas Jean-Baptiste is a 24 y.o. 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    1) Rothia mucilaginosa Bacteremia:   - Blood cultures obtained on  presentation 3/22/2025 from CVL (both lumens) growing Rothia mucilaginosa    - No differential time to positivity, inconsistent with CLABSI   - Was placed on empiric cefepime and Flagyl upon admission (continue empirically)   - Hospital and Pediatric Heme/Onc has had previous patients with Rothia infections resistant to broad-spectrum antibiotics   - Will attempt to send susceptibilities   - Will obtain repeat cultures to assess for response   - Hemodynamically stable and afebrile, will continue to monitor closely    2) Constipation/Rectal Pain/Pain with Defecation/Hematochezia:  - Concern for internal hemorrhoids vs abscess/fistula  - Blood culture as above, no gram-negative organisms identified  - Cefepime 2 mg IV every 8 hrs empirically   - Flagyl discontinued 3/27/2025 given no MRI evidence of fistula or abscess  - Morphine 2 mg IV x 1   - Tylenol every 6 hrs PRN for mild pain  - Morphine 2 mg IV every 3 hrs PRN for moderate-severe pain  - Oxycodone 5-10 mg PO every 4 hrs PRN for moderate-severe pain  - Senna 2 tablets BID, titrate to effect  - Preparation H, apply externally   - Sitz bath PRN  - MRI Pelvis ordered but not obtained as radiology indicating that MRI-RECTUM would need to be ordered as an outpatient on a 3T scanner  - Given that pain is also located in the lower abdomen, we will forego MRI-RECTUM and continue with MRI-PELVIS.  - MRI pelvis today demonstrated mild diffuse increase signal throughout the pelvic muscles possibly related to inflammation.  No apparent sinus tract or fluid collection in the anus or ischial anal fossa.  - Slight improvement today.  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, received cranial radiation 6/5/2023 to 6/16/2023  - Testicular disease negative at diagnosis  - Several  complications throughout therapy to include severe septic shock 12/27/2022 while in Delayed Intensification  - Completed therapy 5/30/2024  - Seen in clinic on 1/15/2025 without any evidence of relapse (clinical/laboratory)  - Reported viral URI symptoms approximately 2.5 weeks prior to presentation   - Interval resolution of viral URI symptoms followed by very acute worsening of flulike symptoms as well as aches and pains  - Seen in clinic 2/27/2025: WBC 1000 cells/uL, Hgb 10.6 g/dL, platelets 53,000 cells/uL, ANC 10, , absolute monocyte count 20   - Precipitous drop of counts over the past month with context of low-grade temperatures and bone pain most consistent with relapsed leukemia   - Admission for Fever and Neutropenia 2/27/2025  - Double-lumen Broviac line placement, diagnostic bone marrow evaluation to include aspirate and biopsy, flow cytometry and FISH to confirm relapse at bedside 2/28/2025  - Testicular negative at relapse  - CSF negative consistent with CNS1  - Confirmed 13% blasts in hemodilute BMA specimen by flow  - Confirmed BCR-ABL1 fusion in 17% cells by FISH  - Discussions with transplant colleagues regarding planning allogeneic transplant / CAR-T therapy   - After discussing multiple options, proposed to patient a 3-Drug Re-Induction (VCR/PRED/PEG-ASP) + Imatinib followed by blinatumomab  - Met virtually with Dr. 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 Adrian - will begin search for donor     5) Individualized Salvage Therapy, 3-Drug Re-Induction, Day 20:  ** Methotrexate 15 mg IT x 1 on Days 1 (COMPLETE, see separate procedure note)  ** Vincristine 2 mg IV x 1 on Days 1 (COMPLETE), 8 (COMPLETE), 15 (COMPLETE) and 22  ** Prednisone 30 mg/m2/dose = 60 mg PO BID x 28 days   ** PEG-Aspargase  2000 IU/m2 x 1 on Day 4 in OPIC (COMPLETE)   ** Imatinib 400 mg PO QAM and 250 mg PO QHS (started 3/6/2025, given resistence, discontinued)  ** Dasatinib 70 mg PO BID, started 3/22/2025  ** On Pepcid BID while on corticosteroids. However, pharmacist called  to report that pepcid has been known to decrease the efficacy of Dasatinib. Hence, switched to Tums and to be  by 2 hrs around Dasatinib administration.   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:  -  cells/uL, Hgb 7.4 g/dL, platelets 32,000 cells/uL  - /microliters, /microliters, absolute monocyte count 0/microliters,  BLASTS: 0%  - Transfuse irradiated PRBC for Hgb<7 g/dL or symptomatic  - Transfuse irradiated platelets for platelet count of <10,000 cells/uL or symptomatic    - No transfusion today  - Neutropenic precautions     - CBC daily            7) At Risk for Opportunistic Pulmonary Infection:  - Heavily pretreated  - Bactrim -160 mg PO BID Sat/Sun  - HSV1 + IgG, 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  - Consider levofloxacin PO as outpatient for prolonged neutropenia     9) FEN/GI:  - Regular diet  - IVF at 100 ml/hr  - BMP daily     10) At Risk For Chemotherapy Induced Nausea and Vomiting:  - Zofran 8 mg IV PRN ordered  - Ativan 1 mg IV PRN ordered      11) Hyperglycemia, Steroid Induced:  - Moderate  -  mg/dL today (no sugars > 200 mg/dL)  - Monitor for need for insulin     12) Transaminitis Secondary To Therapy: (IMPROVED)  - AST: 56 U/L, ALT: 170 U/L 3/25/2025  - Bilirubin total 1.6 mg/dL 3/25/2025  - Will continue to monitor     13) Central Access:  - Double-lumen CVL placed 2/28/2025 by surgery  - Saline flush per protocol    - Will continue to treat through infection for the meantime.    14) Psychosocial:  - Dr. Ortega following     15) Social:    - Referred  to  Social Work and financial navigator     Disposition: Remain inpatient for treatment of bacteremia.    Pepe Faye MD  Pediatric Hematology / Oncology  [facility]  Cell.  [phone]  Office. [phone]

## 2025-03-27 NOTE — CARE PLAN
The patient is Watcher - Medium risk of patient condition declining or worsening    Shift Goals  Clinical Goals: monitor labs, monitor bowel movements  Patient Goals: sleep, feel better  Family Goals: not present    Progress made toward(s) clinical / shift goals:        Problem: Knowledge Deficit - Standard  Goal: Patient and family/care givers will demonstrate understanding of plan of care, disease process/condition, diagnostic tests and medications  Outcome: Progressing     Problem: Pain - Standard  Goal: Alleviation of pain or a reduction in pain to the patient’s comfort goal  Outcome: Progressing     Problem: Fall Risk  Goal: Patient will remain free from falls  Outcome: Progressing

## 2025-03-27 NOTE — PROGRESS NOTES
PEDIATRIC BEHAVIORAL HEALTH VISIT    ADULT REQUEST FOR CONFIDENTIALITY    Name:  Tomas Jean-Baptiste  MRN:  [id number]  :  2000  Age:  24 y.o.  Referring Provider: Dr. Faye (Hem/Onc)  Pediatrician:  Margarito Arvizu M.D.  Date of Service:  3/26/2025    Persons in Attendance: Tomas Roy     Chief Complaint/ Reason for Appointment: JULY, a 23 y/o male now in treatment for relapsed [place] Chromosome Precursor B-Cell Acute Lymphoblastic Leukemia was previously referred to counseling to assist in decreasing anxiety he has been experiencing and processing ways for how he can find himself now and what life will look like. See intake note dated 23. With his current relapse, psychology is meeting with JULY to process and cope with how it is impacting his life.     Mental Status Exam:   General description cooperative with interview  Interactional style Culturally appropriate  Eye contact Appropriate  Speech Unimpaired, fluid and clear, normal rate and rythem  Motor activity Average  Orientation Oriented to person time, place and situation  Intellectual functioning Unimpaired  Memory Unimpaired  Attention and concentration Intact and normative concentration  Fund of knowledge Average  Mood Tearful  Affect Flat  Perceptual Disturbances None apparent  Thought Process  No abnormalities apparent       Associations Unimpaired associations       Abstractions Normal abstractions, intact       Insight Insight - adequate and normative       Judgment Judgments - intact and normative   Thought Content  No apparent delusions    Risk Assessment:  Tomas Roy did not report current concerns regarding risk to self or others.       Issues Discussed:   This provider met with JULY to process the grief he is experiencing around his relapse as well as the estrangement of his mother and siblings. Reviewed his actions and ways to acknowledge his family's actions and choices. Encouraged him to release his grief and  sadness while also focusing on one day at a time and using distraction so he is not only laying in bed thinking.     Techniques and Interventions Used: Psycho-education and Cognitive Behavioral Therapy (CBT)      Treatment Recommendations and Plan:  JULY, a 25 y/o male currently in treatment for relapsed [place] Chromosome Precursor B-Cell Acute Lymphoblastic Leukemia was previously referred to counseling to assist in decreasing anxiety he had been experiencing and processing ways to find himself now and what life will look like. After meeting with JULY during his intake, it appears he could benefit from learning anxiety management skills, processing what he has been through, and how to live the life he wants. In treatment, Tomas Hindu Nabeel Amarosa benefited from learning appropriate ways of expressing and coping with his emotions, learning Cognitive Behavioral Therapy (CBT) and Acceptance and Commitment Therapy (ACT) tools to understand the interaction of thoughts, feelings, and actions, as well as Trauma Focused-CBT to process his cancer journey. Currently, he could benefit from processing how relapse is impacting his life and relationships and how the past is influencing him.       PLAN  JULY will learn and utilize appropriate ways to express and cope with emotions.    He will learn the connection between thoughts, feelings, and actions utilizing CBT and ACT tools.,   JULY will process how their medical diagnosis is impacting their life.    This provider will continue to meet with JULY.     The above diagnostic impressions, recommendations, and treatment plan were discussed with and agreed upon by Tomas Hindu, and his caregivers. Care will be coordinated with Tomas Roy's healthcare team, as appropriate.    Total time spent on encounter was 60 minutes.    Laurie Ortega, PhD  Pediatric Psychologist   Licensed Psychologist, [place] # [id number]  [facility], Behavioral Health

## 2025-03-27 NOTE — PROGRESS NOTES
Pediatric Hematology/Oncology  Daily Progress Note      Patient Name:  Tomas Jean-Bapitste  : 2000  MRN: [id number]    Location of Service:  [facility]  Date of Service: 3/27/2025  Time: 2:00 PM    Hospital Day: 6    Protocol / Treatment Plan: Individualized Re-Induction chemotherapy, 3 Drug + Tyrosine Kinase Inhibitor, Day 21    SUBJECTIVE:     No acute events overnight.  Afebrile Tmax 98.5 °F.  This morning, significantly improved clinically.  Appears to have much more energy and is much more active and talkative.  Not complaining of any headaches.  Does complain of vision changes and that his glasses are not working as they should.  He denies diplopia but does endorse blurred vision.  No complaints of any eye pain.  No complaints of any mouth sores or sore throat.  No difficulty with swallowing.  Appetite is seemingly improved as there is pizza in front of him for breakfast covered in red pepper flakes.  On the same note, abdominal pain is still present but improved.  No complaints of any diarrhea or constipation.  Still some pain with defecation.  No new rashes or skin changes.  No difficulty with breathing, shortness of breath or cough.  No other concerns or complaints at this time.    Review of Systems:     Constitutional: Afebrile, Tmax 98.5 °F.  Clear improvement clinically this morning.  Still with some pain and discomfort and still not feeling well but overall much improved energy and activity.  Also much improved appetite and oral intake.    HENT: Negative.  Eyes: Blurred vision without double vision as above.  No eye pain.  Respiratory: Negative for shortness of breath.  No cough.  Cardiovascular: Negative.  Gastrointestinal: Not complaining of nausea or vomiting today.  Not complaining of significant abdominal pain although still present.  Appetite and oral intake are improved.  Still pain with defecation.  Genitourinary: Negative.  Musculoskeletal: Negative for arm  "pain or leg pain.    Skin: Negative for rash or skin infection.  Neurological: Negative for numbness, tingling, sensory changes, weakness or headaches.    Endo/Heme/Allergies: No bruising/bleeding easily.    Psychiatric/Behavioral: Improved mood.     OBJECTIVE:     Max Temp: Temp (24hrs), Av.8 °C (98.2 °F), Min:36.5 °C (97.7 °F), Max:36.9 °C (98.5 °F)    Vitals: /60   Pulse 82   Temp 36.8 °C (98.2 °F) (Oral)   Resp 16   Ht 1.651 m (5' 5\")   Wt 72.3 kg (159 lb 6.3 oz)   SpO2 93%   BMI 26.52 kg/m²     I/O:   Intake/Output Summary (Last 24 hours) at 3/27/2025 0831  Last data filed at 3/27/2025 0600  Gross per 24 hour   Intake --   Output 2200 ml   Net -2200 ml     Labs:     Latest Reference Range & Units 25 00:05   WBC 4.8 - 10.8 K/uL 0.8 (LL)   RBC 4.70 - 6.10 M/uL 2.50 (L)   Hemoglobin 14.0 - 18.0 g/dL 7.1 (L)   Hematocrit 42.0 - 52.0 % 20.6 (L)   MCV 81.4 - 97.8 fL 82.4   MCH 27.0 - 33.0 pg 28.4   MCHC 32.3 - 36.5 g/dL 34.5   RDW 35.9 - 50.0 fL 40.6   Platelet Count 164 - 446 K/uL 31 (L)   MPV 9.0 - 12.9 fL 9.9   Neutrophils-Polys 44.00 - 72.00 % 79.70 (H)   Neutrophils (Absolute) 1.82 - 7.42 K/uL 0.64 (L)   Lymphocytes 22.00 - 41.00 % 18.70 (L)   Lymphs (Absolute) 1.00 - 4.80 K/uL 0.15 (L)   Monocytes 0.00 - 13.40 % 1.60   Monos (Absolute) 0.00 - 0.85 K/uL 0.01   Eosinophils 0.00 - 6.90 % 0.00   Eos (Absolute) 0.00 - 0.51 K/uL 0.00   Basophils 0.00 - 1.80 % 0.00   Baso (Absolute) 0.00 - 0.12 K/uL 0.00   Nucleated RBC 0.00 - 0.20 /100 WBC 0.00   NRBC (Absolute) K/uL 0.00   Plt Estimation  Decreased   Imm. Plt Fraction 0.6 - 13.1 % 4.3   RBC Morphology  Present   Poikilocytosis  1+   Target Cells  1+   Smudge Cells  Few   Peripheral Smear Review  see below   Manual Diff Status  PERFORMED   Comment  See Comment   (LL): Data is critically low  (L): Data is abnormally low  (H): Data is abnormally high    Blood cultures obtained 3/22/2025 positive from both lines with no differential time to " positivity.  Currently growing gram-positive cocci in clusters    3/24/2025, blood cultures speciated as Rothia mucilaginosa .  Rothia is historically difficult to isolate and speciate.  Hospital and Pediatric Oncology service with previous patients that have had resistant Rothia bacteremias requiring either vancomycin or daptomycin.  Will attempt to get susceptibilities and isolate.  In the meantime, continue with vancomycin.    3/25/2025 blood cultures NGTD    Physical Exam:    Constitutional: Much improved clinical appearance.  Overall tired but not ill-appearing.  Engaged in conversation.  HENT: Normocephalic and atraumatic.  No rhinorrhea. Oropharynx is clear and moist.   Eyes: Conjunctivae are normal. EOMI. Non-icteric. Pupils equal and round.  Neck: Normal range of motion of neck, no adenopathy.    Cardiovascular: Normal rate, regular rhythm.  No murmur. DP/radial pulses 2+, cap refill < 2 sec.  Pulmonary/Chest: Effort normal. No respiratory distress. Symmetric expansion.  No crackles or wheezes.  Abdomen: Soft. Bowel sounds are normal. No distension and no mass. There is no hepatosplenomegaly.    Genitourinary:  Deferred.  Musculoskeletal: Normal range of motion of lower and upper extremities bilaterally.   Neurological: Alert and oriented to person and place. Exhibits normal muscle tone bilaterally in upper and lower extremities. Gait not assessed.  Skin: Skin is warm, dry and pink.  No rash or evidence of skin infection.   Psychiatric: Mood is much improved.    ASSESSMENT AND PLAN:     Tomas Jean-Baptiste is a 24 y.o. 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    1) Rothia mucilaginosa Bacteremia:   - Blood cultures obtained on presentation 3/22/2025 from CVL (both lumens) growing Rothia mucilaginosa    - No differential time to positivity, inconsistent with CLABSI   - Was placed  on empiric cefepime and Flagyl upon admission (continue empirically)   - Hospital and Pediatric Heme/Onc has had previous patients with Rothia infections resistant to broad-spectrum antibiotics   - Will attempt to send susceptibilities   - Will obtain repeat cultures to assess for response -cultures obtained 3/25/2025 NGTD    - Hemodynamically stable and afebrile, will continue to monitor closely    2) Constipation/Rectal Pain/Pain with Defecation/Hematochezia: (IMPROVED)  - Concern for internal hemorrhoids vs abscess/fistula  - Blood culture as above, no gram-negative organisms identified  - Cefepime 2 mg IV every 8 hrs empirically   - Flagyl discontinued 3/27/2025 given no MRI evidence of fistula or abscess  - Morphine 2 mg IV x 1   - Tylenol every 6 hrs PRN for mild pain  - Morphine 2 mg IV every 3 hrs PRN for moderate-severe pain  - Oxycodone 5-10 mg PO every 4 hrs PRN for moderate-severe pain  - Senna 2 tablets BID, titrate to effect  - Preparation H, apply externally   - Sitz bath PRN  - MRI Pelvis ordered but not obtained as radiology indicating that MRI-RECTUM would need to be ordered as an outpatient on a 3T scanner  - Given that pain is also located in the lower abdomen, we will forego MRI-RECTUM and continue with MRI-PELVIS.  - MRI pelvis today demonstrated mild diffuse increase signal throughout the pelvic muscles possibly related to inflammation.  No apparent sinus tract or fluid collection in the anus or ischial anal fossa.  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, received cranial radiation 6/5/2023 to 6/16/2023  - Testicular disease negative at diagnosis  - Several complications throughout therapy to include severe septic shock 12/27/2022 while in Delayed Intensification  -  Completed therapy 5/30/2024  - Seen in clinic on 1/15/2025 without any evidence of relapse (clinical/laboratory)  - Reported viral URI symptoms approximately 2.5 weeks prior to presentation   - Interval resolution of viral URI symptoms followed by very acute worsening of flulike symptoms as well as aches and pains  - Seen in clinic 2/27/2025: WBC 1000 cells/uL, Hgb 10.6 g/dL, platelets 53,000 cells/uL, ANC 10, , absolute monocyte count 20   - Precipitous drop of counts over the past month with context of low-grade temperatures and bone pain most consistent with relapsed leukemia   - Admission for Fever and Neutropenia 2/27/2025  - Double-lumen Broviac line placement, diagnostic bone marrow evaluation to include aspirate and biopsy, flow cytometry and FISH to confirm relapse at bedside 2/28/2025  - Testicular negative at relapse  - CSF negative consistent with CNS1  - Confirmed 13% blasts in hemodilute BMA specimen by flow  - Confirmed BCR-ABL1 fusion in 17% cells by FISH  - Discussions with transplant colleagues regarding planning allogeneic transplant / CAR-T therapy   - After discussing multiple options, proposed to patient a 3-Drug Re-Induction (VCR/PRED/PEG-ASP) + Imatinib followed by blinatumomab  - Met virtually with Dr. 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 Adrian - will begin search for donor     5) Individualized Salvage Therapy, 3-Drug Re-Induction, Day 21:  ** Methotrexate 15 mg IT x 1 on Days 1 (COMPLETE, see separate procedure note)  ** Vincristine 2 mg IV x 1 on Days 1 (COMPLETE), 8 (COMPLETE), 15 (COMPLETE) and 22  ** Prednisone 30 mg/m2/dose = 60 mg PO BID x 28 days   ** PEG-Aspargase 2000 IU/m2 x 1 on Day 4 in OPIC (COMPLETE)   ** Imatinib 400 mg PO QAM and 250 mg PO QHS (started 3/6/2025,  given resistence, discontinued)  ** Dasatinib 70 mg PO BID, started 3/22/2025  ** On Pepcid BID while on corticosteroids. However, pharmacist called  to report that pepcid has been known to decrease the efficacy of Dasatinib. Hence, switched to Tums and to be  by 2 hrs around Dasatinib administration.   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:  -  cells/uL, Hgb 7.1 g/dL, platelets 31,000 cells/uL  - /microliters, /microliters, absolute monocyte count 10/microliters,  BLASTS: 0%  - Transfuse irradiated PRBC for Hgb<7 g/dL or symptomatic  - Transfuse irradiated platelets for platelet count of <10,000 cells/uL or symptomatic    - No transfusion today  - Neutropenic precautions     - CBC daily            7) At Risk for Opportunistic Pulmonary Infection:  - Heavily pretreated  - Bactrim -160 mg PO BID Sat/Sun  - HSV1 + IgG, 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  - Consider levofloxacin PO as outpatient for prolonged neutropenia     9) FEN/GI:  - Regular diet  - IVF at 100 ml/hr  - BMP daily     10) At Risk For Chemotherapy Induced Nausea and Vomiting:  - Zofran 8 mg IV PRN ordered  - Ativan 1 mg IV PRN ordered      11) Hyperglycemia, Steroid Induced:  - Moderate  -  mg/dL today (no sugars > 200 mg/dL)  - Monitor for need for insulin     12) Transaminitis Secondary To Therapy: (IMPROVED)  - AST: 56 U/L, ALT: 170 U/L 3/25/2025  - Bilirubin total 1.6 mg/dL 3/25/2025  - Will continue to monitor     13) Central Access:  - Double-lumen CVL placed 2/28/2025 by surgery  - Saline flush per protocol    - Will continue to treat through infection for the meantime.    14) Psychosocial:  - Dr. Ortega following     15) Social:    - Referred  to  Social Work and financial navigator     Disposition: Remain inpatient for treatment of bacteremia.    Pepe Faye MD  Pediatric Hematology / Oncology  [facility]  Cell.  [phone]  Office. [phone]

## 2025-03-28 ENCOUNTER — APPOINTMENT (OUTPATIENT)
Dept: ONCOLOGY | Facility: MEDICAL CENTER | Age: 25
End: 2025-03-28
Attending: PEDIATRICS
Payer: COMMERCIAL

## 2025-03-28 LAB
ABO GROUP BLD: NORMAL
ANION GAP SERPL CALC-SCNC: 7 MMOL/L (ref 7–16)
BARCODED ABORH UBTYP: 5100
BARCODED PRD CODE UBPRD: NORMAL
BARCODED UNIT NUM UBUNT: NORMAL
BASOPHILS # BLD AUTO: 0 % (ref 0–1.8)
BASOPHILS # BLD: 0 K/UL (ref 0–0.12)
BLD GP AB SCN SERPL QL: NORMAL
BUN SERPL-MCNC: 9 MG/DL (ref 8–22)
CALCIUM SERPL-MCNC: 7.1 MG/DL (ref 8.5–10.5)
CHLORIDE SERPL-SCNC: 105 MMOL/L (ref 96–112)
CO2 SERPL-SCNC: 25 MMOL/L (ref 20–33)
COMMENT NL1176: NORMAL
COMPONENT R 8504R: NORMAL
CREAT SERPL-MCNC: 0.4 MG/DL (ref 0.5–1.4)
EOSINOPHIL # BLD AUTO: 0 K/UL (ref 0–0.51)
EOSINOPHIL NFR BLD: 0 % (ref 0–6.9)
ERYTHROCYTE [DISTWIDTH] IN BLOOD BY AUTOMATED COUNT: 42.6 FL (ref 35.9–50)
GFR SERPLBLD CREATININE-BSD FMLA CKD-EPI: 157 ML/MIN/1.73 M 2
GLUCOSE SERPL-MCNC: 179 MG/DL (ref 65–99)
HCT VFR BLD AUTO: 19.1 % (ref 42–52)
HGB BLD-MCNC: 6.5 G/DL (ref 14–18)
LYMPHOCYTES # BLD AUTO: 0.09 K/UL (ref 1–4.8)
LYMPHOCYTES NFR BLD: 11.7 % (ref 22–41)
MANUAL DIFF BLD: NORMAL
MCH RBC QN AUTO: 28.5 PG (ref 27–33)
MCHC RBC AUTO-ENTMCNC: 34 G/DL (ref 32.3–36.5)
MCV RBC AUTO: 83.8 FL (ref 81.4–97.8)
MONOCYTES # BLD AUTO: 0 K/UL (ref 0–0.85)
MONOCYTES NFR BLD AUTO: 0 % (ref 0–13.4)
MORPHOLOGY BLD-IMP: NORMAL
NEUTROPHILS # BLD AUTO: 0.71 K/UL (ref 1.82–7.42)
NEUTROPHILS NFR BLD: 88.3 % (ref 44–72)
NRBC # BLD AUTO: 0 K/UL
NRBC BLD-RTO: 0 /100 WBC (ref 0–0.2)
PLATELET # BLD AUTO: 33 K/UL (ref 164–446)
PLATELET BLD QL SMEAR: NORMAL
PLATELETS.RETICULATED NFR BLD AUTO: 5.7 % (ref 0.6–13.1)
PMV BLD AUTO: 10.4 FL (ref 9–12.9)
POTASSIUM SERPL-SCNC: 3.7 MMOL/L (ref 3.6–5.5)
PRODUCT TYPE UPROD: NORMAL
RBC # BLD AUTO: 2.28 M/UL (ref 4.7–6.1)
RBC BLD AUTO: NORMAL
RH BLD: NORMAL
SODIUM SERPL-SCNC: 137 MMOL/L (ref 135–145)
UNIT STATUS USTAT: NORMAL
WBC # BLD AUTO: 0.8 K/UL (ref 4.8–10.8)

## 2025-03-28 PROCEDURE — 86850 RBC ANTIBODY SCREEN: CPT

## 2025-03-28 PROCEDURE — A9270 NON-COVERED ITEM OR SERVICE: HCPCS | Performed by: PEDIATRICS

## 2025-03-28 PROCEDURE — 700105 HCHG RX REV CODE 258: Performed by: PEDIATRICS

## 2025-03-28 PROCEDURE — 86945 BLOOD PRODUCT/IRRADIATION: CPT

## 2025-03-28 PROCEDURE — 700111 HCHG RX REV CODE 636 W/ 250 OVERRIDE (IP): Performed by: PEDIATRICS

## 2025-03-28 PROCEDURE — 700111 HCHG RX REV CODE 636 W/ 250 OVERRIDE (IP): Mod: JZ | Performed by: PEDIATRICS

## 2025-03-28 PROCEDURE — 99233 SBSQ HOSP IP/OBS HIGH 50: CPT | Performed by: PEDIATRICS

## 2025-03-28 PROCEDURE — 86923 COMPATIBILITY TEST ELECTRIC: CPT

## 2025-03-28 PROCEDURE — P9016 RBC LEUKOCYTES REDUCED: HCPCS

## 2025-03-28 PROCEDURE — 85027 COMPLETE CBC AUTOMATED: CPT

## 2025-03-28 PROCEDURE — 85007 BL SMEAR W/DIFF WBC COUNT: CPT

## 2025-03-28 PROCEDURE — 86901 BLOOD TYPING SEROLOGIC RH(D): CPT

## 2025-03-28 PROCEDURE — 80048 BASIC METABOLIC PNL TOTAL CA: CPT

## 2025-03-28 PROCEDURE — 86900 BLOOD TYPING SEROLOGIC ABO: CPT

## 2025-03-28 PROCEDURE — 770004 HCHG ROOM/CARE - ONCOLOGY PRIVATE *

## 2025-03-28 PROCEDURE — 85055 RETICULATED PLATELET ASSAY: CPT

## 2025-03-28 PROCEDURE — 700102 HCHG RX REV CODE 250 W/ 637 OVERRIDE(OP): Performed by: PEDIATRICS

## 2025-03-28 PROCEDURE — 36430 TRANSFUSION BLD/BLD COMPNT: CPT

## 2025-03-28 RX ORDER — SODIUM CHLORIDE 9 MG/ML
INJECTION, SOLUTION INTRAVENOUS CONTINUOUS
Status: ACTIVE | OUTPATIENT
Start: 2025-03-28 | End: 2025-03-29

## 2025-03-28 RX ADMIN — VANCOMYCIN HYDROCHLORIDE 1500 MG: 5 INJECTION, POWDER, LYOPHILIZED, FOR SOLUTION INTRAVENOUS at 14:26

## 2025-03-28 RX ADMIN — SODIUM CHLORIDE: 9 INJECTION, SOLUTION INTRAVENOUS at 13:45

## 2025-03-28 RX ADMIN — ANTACID TABLETS 1000 MG: 500 TABLET, CHEWABLE ORAL at 09:26

## 2025-03-28 RX ADMIN — PANTOPRAZOLE SODIUM 40 MG: 40 INJECTION, POWDER, FOR SOLUTION INTRAVENOUS at 05:44

## 2025-03-28 RX ADMIN — PREDNISONE 60 MG: 50 TABLET ORAL at 21:44

## 2025-03-28 RX ADMIN — Medication 1000 UNITS: at 05:44

## 2025-03-28 RX ADMIN — CEFEPIME 2 G: 2 INJECTION, POWDER, FOR SOLUTION INTRAVENOUS at 14:28

## 2025-03-28 RX ADMIN — HYDROCORTISONE: 1 CREAM TOPICAL at 17:15

## 2025-03-28 RX ADMIN — Medication 1 APPLICATOR: at 05:42

## 2025-03-28 RX ADMIN — ONDANSETRON 8 MG: 2 INJECTION INTRAMUSCULAR; INTRAVENOUS at 05:44

## 2025-03-28 RX ADMIN — ANTACID TABLETS 1000 MG: 500 TABLET, CHEWABLE ORAL at 21:44

## 2025-03-28 RX ADMIN — LORAZEPAM 1 MG: 2 INJECTION INTRAMUSCULAR; INTRAVENOUS at 17:40

## 2025-03-28 RX ADMIN — DASATINIB 70 MG: 70 TABLET ORAL at 17:13

## 2025-03-28 RX ADMIN — ONDANSETRON 8 MG: 2 INJECTION INTRAMUSCULAR; INTRAVENOUS at 21:44

## 2025-03-28 RX ADMIN — VANCOMYCIN HYDROCHLORIDE 1500 MG: 5 INJECTION, POWDER, LYOPHILIZED, FOR SOLUTION INTRAVENOUS at 21:52

## 2025-03-28 RX ADMIN — VANCOMYCIN HYDROCHLORIDE 1500 MG: 5 INJECTION, POWDER, LYOPHILIZED, FOR SOLUTION INTRAVENOUS at 01:51

## 2025-03-28 RX ADMIN — ONDANSETRON 8 MG: 2 INJECTION INTRAMUSCULAR; INTRAVENOUS at 14:21

## 2025-03-28 RX ADMIN — VANCOMYCIN HYDROCHLORIDE 1500 MG: 5 INJECTION, POWDER, LYOPHILIZED, FOR SOLUTION INTRAVENOUS at 07:57

## 2025-03-28 RX ADMIN — LORAZEPAM 1 MG: 2 INJECTION INTRAMUSCULAR; INTRAVENOUS at 21:44

## 2025-03-28 RX ADMIN — Medication 1 APPLICATOR: at 17:14

## 2025-03-28 RX ADMIN — CHLORHEXIDINE GLUCONATE, 0.12% ORAL RINSE 15 ML: 1.2 SOLUTION DENTAL at 21:55

## 2025-03-28 RX ADMIN — CEFEPIME 2 G: 2 INJECTION, POWDER, FOR SOLUTION INTRAVENOUS at 05:52

## 2025-03-28 RX ADMIN — HYDROCORTISONE: 1 CREAM TOPICAL at 05:41

## 2025-03-28 RX ADMIN — CHLORHEXIDINE GLUCONATE, 0.12% ORAL RINSE 15 ML: 1.2 SOLUTION DENTAL at 14:21

## 2025-03-28 RX ADMIN — CHLORHEXIDINE GLUCONATE, 0.12% ORAL RINSE 15 ML: 1.2 SOLUTION DENTAL at 09:26

## 2025-03-28 RX ADMIN — CHLORHEXIDINE GLUCONATE, 0.12% ORAL RINSE 15 ML: 1.2 SOLUTION DENTAL at 17:13

## 2025-03-28 RX ADMIN — DASATINIB 70 MG: 70 TABLET ORAL at 05:41

## 2025-03-28 RX ADMIN — PREDNISONE 60 MG: 50 TABLET ORAL at 09:25

## 2025-03-28 RX ADMIN — CEFEPIME 2 G: 2 INJECTION, POWDER, FOR SOLUTION INTRAVENOUS at 21:50

## 2025-03-28 ASSESSMENT — PAIN DESCRIPTION - PAIN TYPE
TYPE: ACUTE PAIN
TYPE: ACUTE PAIN

## 2025-03-28 NOTE — CARE PLAN
The patient is Watcher - Medium risk of patient condition declining or worsening    Shift Goals  Clinical Goals: monitor labs, pain control, ambulate  Patient Goals: rest, feel better  Family Goals: not present    Progress made toward(s) clinical / shift goals:      Problem: Knowledge Deficit - Standard  Goal: Patient and family/care givers will demonstrate understanding of plan of care, disease process/condition, diagnostic tests and medications  Outcome: Progressing     Problem: Pain - Standard  Goal: Alleviation of pain or a reduction in pain to the patient’s comfort goal  Outcome: Progressing     Problem: Fall Risk  Goal: Patient will remain free from falls  Outcome: Progressing

## 2025-03-28 NOTE — CARE PLAN
The patient is Watcher - Medium risk of patient condition declining or worsening    Shift Goals  Clinical Goals: monitor labs, tranfuse RBCs  Patient Goals: rest  Family Goals: not present    Progress made toward(s) clinical / shift goals:        Problem: Knowledge Deficit - Standard  Goal: Patient and family/care givers will demonstrate understanding of plan of care, disease process/condition, diagnostic tests and medications  Outcome: Progressing     Problem: Pain - Standard  Goal: Alleviation of pain or a reduction in pain to the patient’s comfort goal  Outcome: Progressing

## 2025-03-29 LAB
1,25(OH)2D3 SERPL-MCNC: 81.6 PG/ML (ref 19.9–79.3)
ANION GAP SERPL CALC-SCNC: 10 MMOL/L (ref 7–16)
BASOPHILS # BLD AUTO: 0 % (ref 0–1.8)
BASOPHILS # BLD: 0 K/UL (ref 0–0.12)
BUN SERPL-MCNC: 9 MG/DL (ref 8–22)
CALCIUM SERPL-MCNC: 6.8 MG/DL (ref 8.5–10.5)
CHLORIDE SERPL-SCNC: 105 MMOL/L (ref 96–112)
CO2 SERPL-SCNC: 22 MMOL/L (ref 20–33)
COMMENT NL1176: NORMAL
CREAT SERPL-MCNC: 0.4 MG/DL (ref 0.5–1.4)
EOSINOPHIL # BLD AUTO: 0 K/UL (ref 0–0.51)
EOSINOPHIL NFR BLD: 0 % (ref 0–6.9)
ERYTHROCYTE [DISTWIDTH] IN BLOOD BY AUTOMATED COUNT: 42.5 FL (ref 35.9–50)
GFR SERPLBLD CREATININE-BSD FMLA CKD-EPI: 157 ML/MIN/1.73 M 2
GLUCOSE SERPL-MCNC: 178 MG/DL (ref 65–99)
HCT VFR BLD AUTO: 23.3 % (ref 42–52)
HGB BLD-MCNC: 8 G/DL (ref 14–18)
LYMPHOCYTES # BLD AUTO: 0.1 K/UL (ref 1–4.8)
LYMPHOCYTES NFR BLD: 11.6 % (ref 22–41)
MANUAL DIFF BLD: NORMAL
MCH RBC QN AUTO: 28.9 PG (ref 27–33)
MCHC RBC AUTO-ENTMCNC: 34.3 G/DL (ref 32.3–36.5)
MCV RBC AUTO: 84.1 FL (ref 81.4–97.8)
MONOCYTES # BLD AUTO: 0.02 K/UL (ref 0–0.85)
MONOCYTES NFR BLD AUTO: 2.7 % (ref 0–13.4)
MORPHOLOGY BLD-IMP: NORMAL
NEUTROPHILS # BLD AUTO: 0.77 K/UL (ref 1.82–7.42)
NEUTROPHILS NFR BLD: 85.7 % (ref 44–72)
NRBC # BLD AUTO: 0.06 K/UL
NRBC BLD-RTO: 6.7 /100 WBC (ref 0–0.2)
PLATELET # BLD AUTO: 30 K/UL (ref 164–446)
PLATELET BLD QL SMEAR: NORMAL
PLATELETS.RETICULATED NFR BLD AUTO: 9.5 % (ref 0.6–13.1)
PMV BLD AUTO: 10.3 FL (ref 9–12.9)
POIKILOCYTOSIS BLD QL SMEAR: NORMAL
POTASSIUM SERPL-SCNC: 3.6 MMOL/L (ref 3.6–5.5)
RBC # BLD AUTO: 2.77 M/UL (ref 4.7–6.1)
RBC BLD AUTO: PRESENT
SODIUM SERPL-SCNC: 137 MMOL/L (ref 135–145)
STOMATOCYTES BLD QL SMEAR: NORMAL
WBC # BLD AUTO: 0.9 K/UL (ref 4.8–10.8)

## 2025-03-29 PROCEDURE — 770004 HCHG ROOM/CARE - ONCOLOGY PRIVATE *

## 2025-03-29 PROCEDURE — 700102 HCHG RX REV CODE 250 W/ 637 OVERRIDE(OP): Performed by: PEDIATRICS

## 2025-03-29 PROCEDURE — 99233 SBSQ HOSP IP/OBS HIGH 50: CPT | Performed by: PEDIATRICS

## 2025-03-29 PROCEDURE — A9270 NON-COVERED ITEM OR SERVICE: HCPCS | Performed by: PEDIATRICS

## 2025-03-29 PROCEDURE — 85007 BL SMEAR W/DIFF WBC COUNT: CPT

## 2025-03-29 PROCEDURE — 85027 COMPLETE CBC AUTOMATED: CPT

## 2025-03-29 PROCEDURE — 700105 HCHG RX REV CODE 258: Performed by: PEDIATRICS

## 2025-03-29 PROCEDURE — 80048 BASIC METABOLIC PNL TOTAL CA: CPT

## 2025-03-29 PROCEDURE — 700111 HCHG RX REV CODE 636 W/ 250 OVERRIDE (IP): Performed by: PEDIATRICS

## 2025-03-29 PROCEDURE — 700111 HCHG RX REV CODE 636 W/ 250 OVERRIDE (IP): Mod: JZ | Performed by: PEDIATRICS

## 2025-03-29 PROCEDURE — 85055 RETICULATED PLATELET ASSAY: CPT

## 2025-03-29 RX ADMIN — LORAZEPAM 1 MG: 2 INJECTION INTRAMUSCULAR; INTRAVENOUS at 20:12

## 2025-03-29 RX ADMIN — VANCOMYCIN HYDROCHLORIDE 1500 MG: 5 INJECTION, POWDER, LYOPHILIZED, FOR SOLUTION INTRAVENOUS at 02:11

## 2025-03-29 RX ADMIN — Medication 1000 UNITS: at 06:09

## 2025-03-29 RX ADMIN — CHLORHEXIDINE GLUCONATE, 0.12% ORAL RINSE 15 ML: 1.2 SOLUTION DENTAL at 14:13

## 2025-03-29 RX ADMIN — CEFEPIME 2 G: 2 INJECTION, POWDER, FOR SOLUTION INTRAVENOUS at 14:13

## 2025-03-29 RX ADMIN — ONDANSETRON 8 MG: 2 INJECTION INTRAMUSCULAR; INTRAVENOUS at 22:38

## 2025-03-29 RX ADMIN — ANTACID TABLETS 1000 MG: 500 TABLET, CHEWABLE ORAL at 09:16

## 2025-03-29 RX ADMIN — HYDROCORTISONE: 1 CREAM TOPICAL at 06:11

## 2025-03-29 RX ADMIN — SULFAMETHOXAZOLE AND TRIMETHOPRIM 1 TABLET: 800; 160 TABLET ORAL at 22:38

## 2025-03-29 RX ADMIN — HYDROCORTISONE: 1 CREAM TOPICAL at 17:21

## 2025-03-29 RX ADMIN — CHLORHEXIDINE GLUCONATE, 0.12% ORAL RINSE 15 ML: 1.2 SOLUTION DENTAL at 22:38

## 2025-03-29 RX ADMIN — CHLORHEXIDINE GLUCONATE, 0.12% ORAL RINSE 15 ML: 1.2 SOLUTION DENTAL at 17:19

## 2025-03-29 RX ADMIN — ONDANSETRON 8 MG: 2 INJECTION INTRAMUSCULAR; INTRAVENOUS at 09:16

## 2025-03-29 RX ADMIN — MORPHINE SULFATE 2 MG: 4 INJECTION, SOLUTION INTRAMUSCULAR; INTRAVENOUS at 20:32

## 2025-03-29 RX ADMIN — CEFEPIME 2 G: 2 INJECTION, POWDER, FOR SOLUTION INTRAVENOUS at 06:16

## 2025-03-29 RX ADMIN — Medication 1 APPLICATOR: at 06:13

## 2025-03-29 RX ADMIN — PREDNISONE 60 MG: 50 TABLET ORAL at 09:16

## 2025-03-29 RX ADMIN — VANCOMYCIN HYDROCHLORIDE 1500 MG: 5 INJECTION, POWDER, LYOPHILIZED, FOR SOLUTION INTRAVENOUS at 20:12

## 2025-03-29 RX ADMIN — VANCOMYCIN HYDROCHLORIDE 1500 MG: 5 INJECTION, POWDER, LYOPHILIZED, FOR SOLUTION INTRAVENOUS at 08:19

## 2025-03-29 RX ADMIN — CEFEPIME 2 G: 2 INJECTION, POWDER, FOR SOLUTION INTRAVENOUS at 22:39

## 2025-03-29 RX ADMIN — PREDNISONE 60 MG: 50 TABLET ORAL at 22:38

## 2025-03-29 RX ADMIN — ONDANSETRON 8 MG: 2 INJECTION INTRAMUSCULAR; INTRAVENOUS at 17:11

## 2025-03-29 RX ADMIN — VINCRISTINE SULFATE 2 MG: 1 INJECTION, SOLUTION INTRAVENOUS at 18:16

## 2025-03-29 RX ADMIN — LORAZEPAM 1 MG: 2 INJECTION INTRAMUSCULAR; INTRAVENOUS at 06:10

## 2025-03-29 RX ADMIN — DASATINIB 70 MG: 70 TABLET ORAL at 06:10

## 2025-03-29 RX ADMIN — DASATINIB 70 MG: 70 TABLET ORAL at 18:00

## 2025-03-29 RX ADMIN — PANTOPRAZOLE SODIUM 40 MG: 40 INJECTION, POWDER, FOR SOLUTION INTRAVENOUS at 06:09

## 2025-03-29 RX ADMIN — OXYCODONE HYDROCHLORIDE 5 MG: 5 TABLET ORAL at 17:12

## 2025-03-29 RX ADMIN — ANTACID TABLETS 1000 MG: 500 TABLET, CHEWABLE ORAL at 22:38

## 2025-03-29 RX ADMIN — Medication 1 APPLICATOR: at 20:12

## 2025-03-29 RX ADMIN — SULFAMETHOXAZOLE AND TRIMETHOPRIM 1 TABLET: 800; 160 TABLET ORAL at 09:16

## 2025-03-29 RX ADMIN — CHLORHEXIDINE GLUCONATE, 0.12% ORAL RINSE 15 ML: 1.2 SOLUTION DENTAL at 09:16

## 2025-03-29 RX ADMIN — SODIUM CHLORIDE: 9 INJECTION, SOLUTION INTRAVENOUS at 02:10

## 2025-03-29 RX ADMIN — VANCOMYCIN HYDROCHLORIDE 1500 MG: 5 INJECTION, POWDER, LYOPHILIZED, FOR SOLUTION INTRAVENOUS at 14:45

## 2025-03-29 ASSESSMENT — PAIN DESCRIPTION - PAIN TYPE
TYPE: ACUTE PAIN

## 2025-03-29 NOTE — PROGRESS NOTES
Chemotherapy Verification - SECONDARY RN       Height = 165.1 cm  Weight = 72.3 kg  BSA = 1.82 m2       Medication: Vincristine  Dose: 1.5 mg/m2  Calculated Dose: 2.73 mg                             (2 mg max dose)       I confirm that this process was performed independently.

## 2025-03-29 NOTE — PROGRESS NOTES
"Pharmacy Chemotherapy Verification Note:     Dx: B-ALL, PH+ relapsed         Protocol: Individualized 3 Drug Reinduction + Dasatinib (switched from imatinib 3/22)     IT methotrexate 15 mg for age >/=9 yrs on Days 1, and             3/14/25 Dr. Faye omitting Day 8, pt is CNS negative upon relapse.   3/21/25 Confirmed Dr. Parker  is holding day 29 as well.   Vincristine 1.5 mg/m2 (max 2 mg) IV over 5-10 mins on Days 1, 8, 15, and 22  Prednisone 30 mg/m2 PO BID on Days 1-28  Pegaspargase 2000 units/m2 IV over 1-2 hrs on Day 4  Dasatinib 70mg twice daily (standard dose)       [place] [facility] [id number][place]1732 and 1631 (dasatinib)  Plan to transition to Blincyto consolidation after induction     Allergies:  Amoxicillin     /58   Pulse (!) 108   Temp 37.1 °C (98.8 °F) (Temporal)   Resp 18   Ht 1.65 m (5' 4.96\")   Wt 72.4 kg (159 lb 9.8 oz)   SpO2 99%   BMI 26.59 kg/m²   Body surface area is 1.82 meters squared.     Labs 3/21/25:  ANC~ 770           Plt = 30k             Hgb = 8.0         SCr = 0.4 mg/dLCrCl > 125 mL/min   AST/ALT/AP = 56:170:86  TBili = 1.6             DBili = 0.7 (from 3/21/2025)          K+ = 3.6  Tx parameters met. Per MD proceeding with chemotherapy today.      Drug Order   (Drug name, dose, route, IV Fluid & volume, frequency, number of doses) Cycle: Re-induction Day 22  Previous treatment: D15 3/21/25; completed initial treatment 5/30/24      Medication = IT methotrexate  Base Dose = 15 mg fixed dose  Calc Dose: n/a  Final Dose = -- mg  Route = intraTHECAL  Fluid & Volume = NS 6 mL  Admin Duration = n/a    Days 1, 8, and 29   Dr. Faye omitting Day 8 and 29, pt is CNS negative upon relapse.        <10% difference, okay to treat with final dose   Medication = vincristine  Base Dose = 1.5 mg/m2 (max 2 mg)  Calc Dose: Base Dose x 1.82 m2 = 2.73 mg  Final Dose = 2 mg(MAX)  Route = IV  Fluid & Volume = NS 25 mL  Admin Duration = Over 5-10 mins    Days 1, 8, 15, and 22        " okay to treat with capped  final dose   Medication = pegaspargase (Oncaspar)  Base Dose = 2000 units/m2  Calc Dose:Base Dose x 1.9 m2 = 3800 units  Final Dose = 3720 units  Route = IV  Fluid & Volume =  mL  Admin Duration = Over 2 hrs    Day 4        <10% difference, okay to treat with final dose      By my signature below, I confirm this process was performed independently with the BSA and all final chemotherapy dosing calculations congruent. I have reviewed the above chemotherapy order and that my calculation of the final dose and BSA (when applicable) corroborate those calculations of the  pharmacist.      Mely Vogel, PharmD

## 2025-03-29 NOTE — PROGRESS NOTES
Pediatric Hematology/Oncology  Daily Progress Note      Patient Name:  Tomas Jean-Baptiste  : 2000  MRN: [id number]    Location of Service:  [facility]  Date of Service: 3/28/2025  Time: 3:00 PM    Hospital Day: 7    Protocol / Treatment Plan: Individualized Re-Induction chemotherapy, 3 Drug + Tyrosine Kinase Inhibitor, Day 22    SUBJECTIVE:     No acute events overnight.  Afebrile Tmax 98.6 °F.  Today, Tomas Roy reports that he continues to feel a little bit better.  He has quite a bit more energy and is more active.  He denies any new headaches, changes in vision or neurologic status changes.  Still complains of some blurred vision.  No complaints of any nausea, vomiting, diarrhea or constipation.  Still with pain with defecation however this is managed reasonably well with oxycodone.  No complaints of any lower abdominal pain today. Tomas Roy does not complain of any other aches or pains.  No skin changes or rashes.  Adequate color is stable.  No difficulty with breathing, shortness of breath or cough.  No other concerns or complaints at this time.    Review of Systems:     Constitutional: Afebrile, Tmax 98.5 °F.  Clear improvement clinically this morning.  Feeling better with each day following hospitalization.  Improved energy and activity.  Still feeling ill overall however.  Appetite has improved.  HENT: Negative.  Eyes: No eye complaints today, still with mild blurred vision.  No eye pain.  Respiratory: Negative for shortness of breath.  No cough.  Cardiovascular: Negative.  Gastrointestinal: Not complaining of nausea or vomiting today.  Not complaining of significant abdominal pain although still present.  Appetite and oral intake are improved.  Still pain with defecation although improved and controlled with hydrocodone..  Genitourinary: Negative.  Musculoskeletal: Negative for arm pain or leg pain.    Skin: Negative for rash or skin  "infection.  Neurological: Negative for numbness, tingling, sensory changes, weakness or headaches.    Endo/Heme/Allergies: No bruising/bleeding easily.    Psychiatric/Behavioral: Improved mood.     OBJECTIVE:     Max Temp: Temp (24hrs), Av.8 °C (98.2 °F), Min:36.5 °C (97.7 °F), Max:37 °C (98.6 °F)    Vitals: /74   Pulse 96   Temp 37 °C (98.6 °F) (Oral)   Resp 16   Ht 1.651 m (5' 5\")   Wt 72.3 kg (159 lb 6.3 oz)   SpO2 96%   BMI 26.52 kg/m²     I/O:   Intake/Output Summary (Last 24 hours) at 3/28/2025 1945  Last data filed at 3/28/2025 0410  Gross per 24 hour   Intake --   Output 1450 ml   Net -1450 ml     Labs:     Latest Reference Range & Units 25 00:00 25 09:39   WBC 4.8 - 10.8 K/uL 0.8 (LL)    RBC 4.70 - 6.10 M/uL 2.28 (L)    Hemoglobin 14.0 - 18.0 g/dL 6.5 (L)    Hematocrit 42.0 - 52.0 % 19.1 (L)    MCV 81.4 - 97.8 fL 83.8    MCH 27.0 - 33.0 pg 28.5    MCHC 32.3 - 36.5 g/dL 34.0    RDW 35.9 - 50.0 fL 42.6    Platelet Count 164 - 446 K/uL 33 (L)    MPV 9.0 - 12.9 fL 10.4    Neutrophils-Polys 44.00 - 72.00 % 88.30 (H)    Neutrophils (Absolute) 1.82 - 7.42 K/uL 0.71 (L)    Lymphocytes 22.00 - 41.00 % 11.70 (L)    Lymphs (Absolute) 1.00 - 4.80 K/uL 0.09 (L)    Monocytes 0.00 - 13.40 % 0.00    Monos (Absolute) 0.00 - 0.85 K/uL 0.00    Eosinophils 0.00 - 6.90 % 0.00    Eos (Absolute) 0.00 - 0.51 K/uL 0.00    Basophils 0.00 - 1.80 % 0.00    Baso (Absolute) 0.00 - 0.12 K/uL 0.00    Nucleated RBC 0.00 - 0.20 /100 WBC 0.00    NRBC (Absolute) K/uL 0.00    Plt Estimation  Decreased    Imm. Plt Fraction 0.6 - 13.1 % 5.7    RBC Morphology  Normal    Peripheral Smear Review  see below    Manual Diff Status  PERFORMED    Comment  See Comment    Sodium 135 - 145 mmol/L  137   Potassium 3.6 - 5.5 mmol/L  3.7   Chloride 96 - 112 mmol/L  105   Co2 20 - 33 mmol/L  25   Anion Gap 7.0 - 16.0   7.0   Glucose 65 - 99 mg/dL  179 (H)   Bun 8 - 22 mg/dL  9   Creatinine 0.50 - 1.40 mg/dL  0.40 (L)   GFR " (CKD-EPI) >60 mL/min/1.73 m 2  157   Calcium 8.5 - 10.5 mg/dL  7.1 (L)   (LL): Data is critically low  (L): Data is abnormally low  (H): Data is abnormally high    Blood cultures obtained 3/22/2025 positive from both lines with no differential time to positivity.  Currently growing gram-positive cocci in clusters    3/24/2025, blood cultures speciated as Rothia mucilaginosa .  Rothia is historically difficult to isolate and speciate.  Hospital and Pediatric Oncology service with previous patients that have had resistant Rothia bacteremias requiring either vancomycin or daptomycin.  Will attempt to get susceptibilities and isolate.  In the meantime, continue with vancomycin.    3/25/2025 blood cultures NGTD    Physical Exam:    Constitutional: Much improved clinical appearance.  Not as tired appearing today.   HENT: Normocephalic and atraumatic.  No rhinorrhea. Oropharynx is clear and moist.   Eyes: Conjunctivae are normal. EOMI. Non-icteric. Pupils equal and round.  Neck: Normal range of motion of neck, no adenopathy.    Cardiovascular: Normal rate, regular rhythm.  No murmur. DP/radial pulses 2+, cap refill < 2 sec.  Pulmonary/Chest: Effort normal. No respiratory distress. Symmetric expansion.  No crackles or wheezes.  Abdomen: Soft. Bowel sounds are normal. No distension and no mass. There is no hepatosplenomegaly.    Genitourinary:  Deferred.  Musculoskeletal: Normal range of motion of lower and upper extremities bilaterally.   Neurological: Alert and oriented to person and place. Exhibits normal muscle tone bilaterally in upper and lower extremities. Gait not assessed.  Skin: Skin is warm, dry and pink.  No rash or evidence of skin infection.   Psychiatric: Mood is improved again.    ASSESSMENT AND PLAN:     Tomas Jean-Baptiste is a 24 y.o. 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    1) Rothia mucilaginosa Bacteremia:   - Blood cultures obtained on presentation 3/22/2025 from CVL (both lumens) growing Rothia mucilaginosa    - No differential time to positivity, inconsistent with CLABSI   - Was placed on empiric cefepime and Flagyl upon admission (continue empirically)   - Hospital and Pediatric Heme/Onc has had previous patients with Rothia infections resistant to broad-spectrum antibiotics   - Will attempt to send susceptibilities   - Will obtain repeat cultures to assess for response  - cultures obtained 3/25/2025 NGTD    - Hemodynamically stable and afebrile, will continue to monitor closely    2) Constipation/Rectal Pain/Pain with Defecation/Hematochezia: (IMPROVED)  - Concern for internal hemorrhoids vs abscess/fistula  - Blood culture as above, no gram-negative organisms identified  - Cefepime 2 mg IV every 8 hrs empirically   - Flagyl discontinued 3/27/2025 given no MRI evidence of fistula or abscess  - Morphine 2 mg IV x 1   - Tylenol every 6 hrs PRN for mild pain  - Morphine 2 mg IV every 3 hrs PRN for moderate-severe pain  - Oxycodone 5-10 mg PO every 4 hrs PRN for moderate-severe pain  - Senna 2 tablets BID, titrate to effect  - Preparation H, apply externally   - Sitz bath PRN  - MRI Pelvis ordered but not obtained as radiology indicating that MRI-RECTUM would need to be ordered as an outpatient on a 3T scanner  - Given that pain is also located in the lower abdomen, we will forego MRI-RECTUM and continue with MRI-PELVIS.  - MRI pelvis today demonstrated mild diffuse increase signal throughout the pelvic muscles possibly related to inflammation.  No apparent sinus tract or fluid collection in the anus or ischial anal fossa.  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, received cranial radiation 6/5/2023 to 6/16/2023  - Testicular disease negative at diagnosis  - Several complications throughout therapy to include severe septic shock 12/27/2022 while in Delayed Intensification  - Completed therapy 5/30/2024  - Seen in clinic on 1/15/2025 without any evidence of relapse (clinical/laboratory)  - Reported viral URI symptoms approximately 2.5 weeks prior to presentation   - Interval resolution of viral URI symptoms followed by very acute worsening of flulike symptoms as well as aches and pains  - Seen in clinic 2/27/2025: WBC 1000 cells/uL, Hgb 10.6 g/dL, platelets 53,000 cells/uL, ANC 10, , absolute monocyte count 20   - Precipitous drop of counts over the past month with context of low-grade temperatures and bone pain most consistent with relapsed leukemia   - Admission for Fever and Neutropenia 2/27/2025  - Double-lumen Broviac line placement, diagnostic bone marrow evaluation to include aspirate and biopsy, flow cytometry and FISH to confirm relapse at bedside 2/28/2025  - Testicular negative at relapse  - CSF negative consistent with CNS1  - Confirmed 13% blasts in hemodilute BMA specimen by flow  - Confirmed BCR-ABL1 fusion in 17% cells by FISH  - Discussions with transplant colleagues regarding planning allogeneic transplant / CAR-T therapy   - After discussing multiple options, proposed to patient a 3-Drug Re-Induction (VCR/PRED/PEG-ASP) + Imatinib followed by blinatumomab  - Met virtually with Dr. 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 Adrian - will begin search for donor     5) Individualized Salvage Therapy, 3-Drug Re-Induction, Day 21:  ** Methotrexate 15 mg IT x 1 on Days 1 (COMPLETE, see separate procedure note)  ** Vincristine 2 mg IV x 1 on  Days 1 (COMPLETE), 8 (COMPLETE), 15 (COMPLETE) and 22 (TODAY)  ** Prednisone 30 mg/m2/dose = 60 mg PO BID x 28 days   ** [facility]   ** Imatinib 400 mg PO QAM and 250 mg PO QHS (started 3/6/2025, given resistence, discontinued)  ** Dasatinib 70 mg PO BID, started 3/22/2025  ** On Pepcid BID while on corticosteroids. However, pharmacist called  to report that pepcid has been known to decrease the efficacy of Dasatinib. Hence, switched to Tums and to be  by 2 hrs around Dasatinib administration.   ** Given significant abdominal pain, will administer pantoprazole - risk-benefit evaluated, will continue with pantoprazole with reevaluation each day -given improvement in pain and increase in appetite, will consider discontinuation of pantoprazole tomorrow    - IF IMPROVED ABDOMINAL PAIN TOMORROW, WILL D/C PROTONIX      6) Pancytopenia Secondary to Leukemia:  -  cells/uL, Hgb 6.5 g/dL, platelets 33,000 cells/uL  - /microliters, ALC 90/microliters, absolute monocyte count 0/microliters,  BLASTS: 0%  - Transfuse irradiated PRBC for Hgb<7 g/dL or symptomatic  - Transfuse irradiated platelets for platelet count of <10,000 cells/uL or symptomatic    - Transfusing PRBC today for hemoglobin 6.5 g/dL    - Neutropenic precautions     - CBC daily            7) At Risk for Opportunistic Pulmonary Infection:  - Heavily pretreated  - Bactrim -160 mg PO BID Sat/Sun  - HSV1 + IgG, 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  - Consider levofloxacin PO as outpatient for prolonged neutropenia     9) FEN/GI:  - Regular diet  - IVF at 100 ml/hr  - BMP daily     10) At Risk For Chemotherapy Induced Nausea and Vomiting:  - Zofran 8 mg IV PRN ordered  - Ativan 1 mg IV PRN ordered      11) Hyperglycemia, Steroid Induced:  - Moderate  -  mg/dL today (no sugars > 200 mg/dL)  - Secondary to arrival with more moderately elevated blood sugars  - Monitor for need for insulin     12)  Transaminitis Secondary To Therapy: (IMPROVED)  - AST: 56 U/L, ALT: 170 U/L 3/25/2025  - Bilirubin total 1.6 mg/dL 3/25/2025  - Will continue to monitor     13) Central Access:  - Double-lumen CVL placed 2/28/2025 by surgery  - Saline flush per protocol    - Will continue to treat through infection for the meantime.    14) Psychosocial:  - Dr. Ortega following     15) Social:    - Referred  to Social Work and financial navigator     Disposition: Remain inpatient for treatment of bacteremia.    Pepe Faye MD  Pediatric Hematology / Oncology  [facility]  Cell.  [phone]  Office. [phone]

## 2025-03-29 NOTE — PROGRESS NOTES
Chemotherapy Verification - PRIMARY RN      Height = 165.1 cm  Weight = 72.3 kg  BSA = 1.82 m2       Medication: vincristine  Dose: 1.5 mg/m2  Calculated Dose: 2.73 mg (Ordered dose 2 mg)                             (In mg/m2, AUC, mg/kg)       I confirm this process was performed independently with the BSA and all final chemotherapy dosing calculations congruent.  Any discrepancies of 10% or greater have been addressed with the chemotherapy pharmacist. The resolution of the discrepancy has been documented in the EPIC progress notes.

## 2025-03-29 NOTE — CARE PLAN
The patient is Watcher - Medium risk of patient condition declining or worsening    Shift Goals  Clinical Goals: monitor labs, mobility, anxiety and pain control  Patient Goals: rest  Family Goals: None Present    Progress made toward(s) clinical / shift goals:      Problem: Knowledge Deficit - Standard  Goal: Patient and family/care givers will demonstrate understanding of plan of care, disease process/condition, diagnostic tests and medications  Outcome: Progressing     Problem: Pain - Standard  Goal: Alleviation of pain or a reduction in pain to the patient’s comfort goal  Outcome: Progressing     Problem: Fall Risk  Goal: Patient will remain free from falls  Outcome: Progressing

## 2025-03-29 NOTE — CARE PLAN
The patient is Watcher - Medium risk of patient condition declining or worsening    Shift Goals  Clinical Goals: Monitor Labs, chemo, IV ABX  Patient Goals: Rest, eat  Family Goals: None Present    Progress made toward(s) clinical / shift goals:        Problem: Knowledge Deficit - Standard  Goal: Patient and family/care givers will demonstrate understanding of plan of care, disease process/condition, diagnostic tests and medications  Description: Target End Date:  1-3 days or as soon as patient condition allowsDocument in Patient Education1.  Patient and family/caregiver oriented to unit, equipment, visitation policy and means for communicating concern2.  Complete/review Learning Assessment3.  Assess knowledge level of disease process/condition, treatment plan, diagnostic tests and medications4.  Explain disease process/condition, treatment plan, diagnostic tests and medications  Outcome: Progressing  Note: Patient understands the need to tolerate chemo. Patient will continue to notify RN of pain medication needs.

## 2025-03-30 LAB
ANION GAP SERPL CALC-SCNC: 9 MMOL/L (ref 7–16)
BACTERIA BLD CULT: NORMAL
BACTERIA BLD CULT: NORMAL
BASOPHILS # BLD AUTO: 0 % (ref 0–1.8)
BASOPHILS # BLD: 0 K/UL (ref 0–0.12)
BUN SERPL-MCNC: 7 MG/DL (ref 8–22)
CALCIUM SERPL-MCNC: 6.9 MG/DL (ref 8.5–10.5)
CHLORIDE SERPL-SCNC: 104 MMOL/L (ref 96–112)
CO2 SERPL-SCNC: 23 MMOL/L (ref 20–33)
COMMENT NL1176: NORMAL
CREAT SERPL-MCNC: 0.33 MG/DL (ref 0.5–1.4)
EOSINOPHIL # BLD AUTO: 0 K/UL (ref 0–0.51)
EOSINOPHIL NFR BLD: 0 % (ref 0–6.9)
ERYTHROCYTE [DISTWIDTH] IN BLOOD BY AUTOMATED COUNT: 44.9 FL (ref 35.9–50)
GFR SERPLBLD CREATININE-BSD FMLA CKD-EPI: 166 ML/MIN/1.73 M 2
GLUCOSE SERPL-MCNC: 124 MG/DL (ref 65–99)
HCT VFR BLD AUTO: 24.5 % (ref 42–52)
HGB BLD-MCNC: 8.5 G/DL (ref 14–18)
LYMPHOCYTES # BLD AUTO: 0.13 K/UL (ref 1–4.8)
LYMPHOCYTES NFR BLD: 21.9 % (ref 22–41)
MANUAL DIFF BLD: NORMAL
MCH RBC QN AUTO: 29.4 PG (ref 27–33)
MCHC RBC AUTO-ENTMCNC: 34.7 G/DL (ref 32.3–36.5)
MCV RBC AUTO: 84.8 FL (ref 81.4–97.8)
MICROCYTES BLD QL SMEAR: ABNORMAL
MONOCYTES # BLD AUTO: 0.05 K/UL (ref 0–0.85)
MONOCYTES NFR BLD AUTO: 8.3 % (ref 0–13.4)
MORPHOLOGY BLD-IMP: NORMAL
NEUTROPHILS # BLD AUTO: 0.42 K/UL (ref 1.82–7.42)
NEUTROPHILS NFR BLD: 69.8 % (ref 44–72)
NRBC # BLD AUTO: 0.08 K/UL
NRBC BLD-RTO: 12.5 /100 WBC (ref 0–0.2)
PLATELET # BLD AUTO: 43 K/UL (ref 164–446)
PLATELET BLD QL SMEAR: NORMAL
PLATELETS.RETICULATED NFR BLD AUTO: 8.6 % (ref 0.6–13.1)
PMV BLD AUTO: 10.9 FL (ref 9–12.9)
POTASSIUM SERPL-SCNC: 3.8 MMOL/L (ref 3.6–5.5)
RBC # BLD AUTO: 2.89 M/UL (ref 4.7–6.1)
RBC BLD AUTO: PRESENT
SIGNIFICANT IND 70042: NORMAL
SIGNIFICANT IND 70042: NORMAL
SITE SITE: NORMAL
SITE SITE: NORMAL
SODIUM SERPL-SCNC: 136 MMOL/L (ref 135–145)
SOURCE SOURCE: NORMAL
SOURCE SOURCE: NORMAL
WBC # BLD AUTO: 0.6 K/UL (ref 4.8–10.8)

## 2025-03-30 PROCEDURE — 700111 HCHG RX REV CODE 636 W/ 250 OVERRIDE (IP): Mod: JZ | Performed by: PEDIATRICS

## 2025-03-30 PROCEDURE — 700105 HCHG RX REV CODE 258: Performed by: PEDIATRICS

## 2025-03-30 PROCEDURE — 700111 HCHG RX REV CODE 636 W/ 250 OVERRIDE (IP): Performed by: PEDIATRICS

## 2025-03-30 PROCEDURE — 99233 SBSQ HOSP IP/OBS HIGH 50: CPT | Performed by: PEDIATRICS

## 2025-03-30 PROCEDURE — 80048 BASIC METABOLIC PNL TOTAL CA: CPT

## 2025-03-30 PROCEDURE — A9270 NON-COVERED ITEM OR SERVICE: HCPCS | Performed by: PEDIATRICS

## 2025-03-30 PROCEDURE — 700102 HCHG RX REV CODE 250 W/ 637 OVERRIDE(OP): Performed by: PEDIATRICS

## 2025-03-30 PROCEDURE — 85027 COMPLETE CBC AUTOMATED: CPT

## 2025-03-30 PROCEDURE — 85055 RETICULATED PLATELET ASSAY: CPT

## 2025-03-30 PROCEDURE — 85007 BL SMEAR W/DIFF WBC COUNT: CPT

## 2025-03-30 PROCEDURE — 770004 HCHG ROOM/CARE - ONCOLOGY PRIVATE *

## 2025-03-30 RX ORDER — CALCIUM CARBONATE 500 MG/1
1000 TABLET, CHEWABLE ORAL
Status: DISCONTINUED | OUTPATIENT
Start: 2025-03-30 | End: 2025-04-01

## 2025-03-30 RX ADMIN — Medication 1000 UNITS: at 05:57

## 2025-03-30 RX ADMIN — Medication 1 APPLICATOR: at 05:58

## 2025-03-30 RX ADMIN — ONDANSETRON 8 MG: 2 INJECTION INTRAMUSCULAR; INTRAVENOUS at 05:58

## 2025-03-30 RX ADMIN — CHLORHEXIDINE GLUCONATE, 0.12% ORAL RINSE 15 ML: 1.2 SOLUTION DENTAL at 08:49

## 2025-03-30 RX ADMIN — SULFAMETHOXAZOLE AND TRIMETHOPRIM 1 TABLET: 800; 160 TABLET ORAL at 22:27

## 2025-03-30 RX ADMIN — ANTACID TABLETS 1000 MG: 500 TABLET, CHEWABLE ORAL at 08:48

## 2025-03-30 RX ADMIN — CEFEPIME 2 G: 2 INJECTION, POWDER, FOR SOLUTION INTRAVENOUS at 14:00

## 2025-03-30 RX ADMIN — HYDROCORTISONE: 1 CREAM TOPICAL at 18:37

## 2025-03-30 RX ADMIN — ANTACID TABLETS 1000 MG: 500 TABLET, CHEWABLE ORAL at 18:36

## 2025-03-30 RX ADMIN — PREDNISONE 60 MG: 50 TABLET ORAL at 08:49

## 2025-03-30 RX ADMIN — DASATINIB 70 MG: 70 TABLET ORAL at 18:39

## 2025-03-30 RX ADMIN — Medication 1 APPLICATOR: at 18:38

## 2025-03-30 RX ADMIN — LORAZEPAM 1 MG: 2 INJECTION INTRAMUSCULAR; INTRAVENOUS at 08:55

## 2025-03-30 RX ADMIN — SULFAMETHOXAZOLE AND TRIMETHOPRIM 1 TABLET: 800; 160 TABLET ORAL at 08:51

## 2025-03-30 RX ADMIN — CHLORHEXIDINE GLUCONATE, 0.12% ORAL RINSE 15 ML: 1.2 SOLUTION DENTAL at 14:02

## 2025-03-30 RX ADMIN — ANTACID TABLETS 1000 MG: 500 TABLET, CHEWABLE ORAL at 13:59

## 2025-03-30 RX ADMIN — ONDANSETRON 8 MG: 2 INJECTION INTRAMUSCULAR; INTRAVENOUS at 22:27

## 2025-03-30 RX ADMIN — VANCOMYCIN HYDROCHLORIDE 1500 MG: 5 INJECTION, POWDER, LYOPHILIZED, FOR SOLUTION INTRAVENOUS at 14:07

## 2025-03-30 RX ADMIN — SCOPOLAMINE 1 PATCH: 1.5 PATCH, EXTENDED RELEASE TRANSDERMAL at 08:50

## 2025-03-30 RX ADMIN — VANCOMYCIN HYDROCHLORIDE 1500 MG: 5 INJECTION, POWDER, LYOPHILIZED, FOR SOLUTION INTRAVENOUS at 19:43

## 2025-03-30 RX ADMIN — CEFEPIME 2 G: 2 INJECTION, POWDER, FOR SOLUTION INTRAVENOUS at 05:58

## 2025-03-30 RX ADMIN — DASATINIB 70 MG: 70 TABLET ORAL at 06:00

## 2025-03-30 RX ADMIN — ONDANSETRON 8 MG: 2 INJECTION INTRAMUSCULAR; INTRAVENOUS at 14:00

## 2025-03-30 RX ADMIN — VANCOMYCIN HYDROCHLORIDE 1500 MG: 5 INJECTION, POWDER, LYOPHILIZED, FOR SOLUTION INTRAVENOUS at 02:23

## 2025-03-30 RX ADMIN — PREDNISONE 60 MG: 50 TABLET ORAL at 22:27

## 2025-03-30 RX ADMIN — LORAZEPAM 1 MG: 2 INJECTION INTRAMUSCULAR; INTRAVENOUS at 16:12

## 2025-03-30 RX ADMIN — CHLORHEXIDINE GLUCONATE, 0.12% ORAL RINSE 15 ML: 1.2 SOLUTION DENTAL at 22:27

## 2025-03-30 RX ADMIN — LORAZEPAM 1 MG: 2 INJECTION INTRAMUSCULAR; INTRAVENOUS at 22:27

## 2025-03-30 RX ADMIN — CEFEPIME 2 G: 2 INJECTION, POWDER, FOR SOLUTION INTRAVENOUS at 22:27

## 2025-03-30 RX ADMIN — HYDROCORTISONE: 1 CREAM TOPICAL at 05:59

## 2025-03-30 RX ADMIN — CHLORHEXIDINE GLUCONATE, 0.12% ORAL RINSE 15 ML: 1.2 SOLUTION DENTAL at 18:37

## 2025-03-30 RX ADMIN — VANCOMYCIN HYDROCHLORIDE 1500 MG: 5 INJECTION, POWDER, LYOPHILIZED, FOR SOLUTION INTRAVENOUS at 08:51

## 2025-03-30 ASSESSMENT — PAIN DESCRIPTION - PAIN TYPE: TYPE: ACUTE PAIN

## 2025-03-30 NOTE — CARE PLAN
The patient is Stable - Low risk of patient condition declining or worsening    Shift Goals  Clinical Goals: Monitor labs, IV abx, chemo  Patient Goals: Pain/nausea control, sleep      Progress made toward(s) clinical / shift goals:     Problem: Knowledge Deficit - Standard  Goal: Patient and family/care givers will demonstrate understanding of plan of care, disease process/condition, diagnostic tests and medications  Outcome: Progressing     Problem: Pain - Standard  Goal: Alleviation of pain or a reduction in pain to the patient’s comfort goal  Outcome: Progressing     Problem: Fall Risk  Goal: Patient will remain free from falls  Outcome: Progressing  The patient is Watcher - Medium risk of patient condition declining or worsening    Shift Goals  Clinical Goals: Monitor labs, IV abx, chemo  Patient Goals: Pain/nausea control, sleep  Family Goals: None Present    Progress made toward(s) clinical / shift goals:      Patient is not progressing towards the following goals:

## 2025-03-30 NOTE — CARE PLAN
The patient is Stable - Low risk of patient condition declining or worsening    Shift Goals  Clinical Goals: Monitor labs, IV abx, chemo  Patient Goals: Pain/nausea control, sleep      Progress made toward(s) clinical / shift goals:     Problem: Knowledge Deficit - Standard  Goal: Patient and family/care givers will demonstrate understanding of plan of care, disease process/condition, diagnostic tests and medications  Outcome: Progressing     Problem: Pain - Standard  Goal: Alleviation of pain or a reduction in pain to the patient’s comfort goal  Outcome: Progressing     Problem: Fall Risk  Goal: Patient will remain free from falls  Outcome: Progressing

## 2025-03-30 NOTE — PROGRESS NOTES
Pediatric Hematology/Oncology Inpatient  Progress Note      Patient Name:  Tomas Jean-Baptiste  : 2000   MRN: [id number]    Location of Service: [facility]   Date of Service: 3/30/2025  Time: 10:55 AM    Primary Care Physician: Margarito Arvizu M.D.    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 His energy was worse yesterday. He is eating breakfast this am. He did have rectal pain with stooling this am, but none active on exam.           Review of Systems:     Constitutional: Afebrile overnight.  Without recent illness.  Energy and activity are low, but better than yesterday.  HENT: Negative for ear pain, nasal congestion or rhinorrhea, nosebleeds and sore throat.  No mouth sores.  Eyes: Negative for visual changes.  Respiratory: Negative for shortness of breath or noisy breathing.   Cardiovascular: Negative for chest pain or extremity swelling.    Gastrointestinal: Negative for nausea, vomiting, abdominal pain, diarrhea. +tenesmus, but no constipation or blood in stool.    Genitourinary: Negative for painful urination, blood in urine or flank pain.    Musculoskeletal: Negative for joint or muscle pains.    Skin: Negative for rash, signs of infection.  Neurological: Negative for numbness, tingling, sensory changes, weakness or headaches.    Endo/Heme/Allergies: Does not bruise/bleed easily.    Psychiatric/Behavioral: No changes in mood, appropriate for age.     PAST MEDICAL HISTORY:   Surgical History:   Past Surgical History:   Procedure Laterality Date    PB REMOVAL TUNNELED CV CATH N/A 2024    Procedure: PORT REMOVAL;  Surgeon: Simona Moise M.D.;  Location: [facility];  Service: General    INCISION AND DRAINAGE GENERAL Left 2023    Procedure: INCISION AND DRAINAGE OF LEFT SHOULDER;  Surgeon: Luke Haddad M.D.;  Location: [facility];  Service: General    CATH PLACEMENT Right 2022     Procedure: INSERTION, CATHETER;  Surgeon: Simona Moise M.D.;  Location: SUR[facility]ERY [facility];  Service: Ent        Allergies:   Allergies   Allergen Reactions    Amoxicillin Rash     Reacted as an infant. No swelling or airway problems.  Tolerated cefepime June 2022       Medications:   No current facility-administered medications on file prior to encounter.     Current Outpatient Medications on File Prior to Encounter   Medication Sig Dispense Refill    dasatinib (SPRYCEL) 70 MG tablet Take 1 Tablet by mouth 2 times a day for 30 days. 60 Tablet 0    predniSONE (DELTASONE) 20 MG Tab Take 3 Tablets by mouth 2 times a day for 25 days. 150 Tablet 0    famotidine (PEPCID) 20 MG Tab Take 1 Tablet by mouth 2 times a day. 60 Tablet 0    sulfamethoxazole-trimethoprim (BACTRIM DS) 800-160 MG tablet Take 1 Tablet by mouth 2 times a day. (Patient taking differently: Take 1 Tablet by mouth 2 times a day. On the weekends only) 16 Tablet 3    senna-docusate (SENNA-S) 8.6-50 MG Tab Take 1 Tablet by mouth every day. 30 Tablet 0    ondansetron (ZOFRAN ODT) 4 MG TABLET DISPERSIBLE Take 2 Tablets by mouth every 6 hours as needed for Nausea/Vomiting. 20 Tablet 3    mupirocin calcium (BACTROBAN) 2 % Cream Apply 1 Application topically 2 times a day. (Patient taking differently: Apply 1 Application topically 2 times a day. Will finish rx prior to procedure.) 15 g 0       FAMILY HISTORY:   Family History   Problem Relation Age of Onset    No Known Problems Mother     Stroke Maternal Grandmother     Diabetes Paternal Grandfather     Hypertension Paternal Grandfather     Hyperlipidemia Paternal Grandfather     Cancer Neg Hx     Heart Disease Neg Hx        SOCIAL HISTORY:   Social History     Socioeconomic History    Marital status: Single     Spouse name: Not on file    Number of children: Not on file    Years of education: Not on file    Highest education level: Not on file   Occupational History    Not on file   Tobacco Use    Smoking  status: Never     Passive exposure: Never    Smokeless tobacco: Never   Vaping Use    Vaping status: Never Used   Substance and Sexual Activity    Alcohol use: Never    Drug use: Never    Sexual activity: Not Currently   Other Topics Concern    Behavioral problems Not Asked    Interpersonal relationships Not Asked    Sad or not enjoying activities Not Asked    Suicidal thoughts Not Asked    Poor school performance Not Asked    Reading difficulties Not Asked    Speech difficulties Not Asked    Writing difficulties Not Asked    Inadequate sleep Not Asked    Excessive TV viewing Not Asked    Excessive video game use Not Asked    Inadequate exercise Not Asked    Sports related Not Asked    Poor diet Not Asked    Family concerns for drug/alcohol abuse Not Asked    Poor oral hygiene Not Asked    Bike safety Not Asked    Family concerns vehicle safety Not Asked   Social History Narrative    Nv      Social Drivers of Health     Financial Resource Strain: Not on file   Food Insecurity: No Food Insecurity (3/22/2025)    Hunger Vital Sign     Worried About Running Out of Food in the Last Year: Never true     Ran Out of Food in the Last Year: Never true   Transportation Needs: No Transportation Needs (3/22/2025)    PRAPARE - Transportation     Lack of Transportation (Medical): No     Lack of Transportation (Non-Medical): No   Physical Activity: Not on file   Stress: Not on file   Social Connections: Not on file   Intimate Partner Violence: Not At Risk (3/22/2025)    Humiliation, Afraid, Rape, and Kick questionnaire     Fear of Current or Ex-Partner: No     Emotionally Abused: No     Physically Abused: No     Sexually Abused: No   Housing Stability: Low Risk  (3/22/2025)    Housing Stability Vital Sign     Unable to Pay for Housing in the Last Year: No     Number of Times Moved in the Last Year: 1     Homeless in the Last Year: No   Girlfriend of 4 years at bedside.     OBJECTIVE:     Vitals:   Ambulatory  "Vitals  Encounter Vitals  Temperature: 36.9 °C (98.4 °F)  Temp src: Oral  Blood Pressure: 118/73  BP Location: Right, Upper Arm  Patient BP Position: Flores's Position  Pulse: 83  Respiration: 16  Pulse Oximetry: 98 %  O2 (LPM): 0  O2 Delivery Device: None - Room Air  Weight: 72.3 kg (159 lb 6.3 oz)  Weight Source: Stand Up Scale  Height: 165.1 cm (5' 5\")  BMI (Calculated): 26.52  Location: Rectum  Description: Sharp, Burning    Labs:  Recent Labs     03/28/25  0000 03/29/25  0000 03/30/25  0225   HEMOGLOBIN 6.5* 8.0* 8.5*   HEMATOCRIT 19.1* 23.3* 24.5*   MCV 83.8 84.1 84.8   MCH 28.5 28.9 29.4   PLATELETCT 33* 30* 43*     Recent Labs     03/28/25  0939 03/29/25  0936 03/30/25  0900   SODIUM 137 137 136   POTASSIUM 3.7 3.6 3.8   CHLORIDE 105 105 104   CO2 25 22 23   GLUCOSE 179* 178* 124*   BUN 9 9 7*     Creatinine 0.33    Physical Exam:    Constitutional: Thin. NT in NAD.   HENT: Normocephalic and atraumatic. No nasal congestion or rhinorrhea. Oropharynx is clear and moist. No oral ulcerations or sores.    Eyes: Conjunctivae are normal. Pupils are equal, round, and reactive to light.    Neck: Normal range of motion of neck, no adenopathy.    Cardiovascular: Normal rate, regular rhythm and normal heart sounds.  No murmur heard. DP/radial pulses 2+, cap refill < 2 sec  Pulmonary/Chest: Effort normal and breath sounds normal. No respiratory distress. Symmetric expansion.  No crackles or wheezes.  Abdomen: Soft. Bowel sounds are normal. No distension and no mass. There is no hepatosplenomegaly.    Genitourinary:  Deferred  Musculoskeletal: Normal range of motion of lower and upper extremities bilaterally. No tenderness to palpation of elbows, wrists, hands, knees, ankles and feet bilaterally.   Lymphadenopathy: No cervical adenopathy, axillary adenopathy or inguinal adenopathy.   Neurological: Alert and oriented to person and place. Exhibits normal muscle tone bilaterally in upper and lower extremities. Gait normal. " Coordination normal.    Skin: Skin is warm, dry and pink.  No rash or evidence of skin infection.    Psychiatric: Mood and affect normal for age.      ASSESSMENT AND PLAN:   Tomas Jean-Baptiste is a 24 y.o. 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 Recheck later this week. Monitor creatinine      2) Constipation/Rectal Pain/Pain with Defecation/Hematochezia:   - Pain was resolved for a day. No longer severe.   - Morphine 2 mg IV every 3 hrs PRN for moderate-severe pain  - Oxycodone 5-10 mg PO every 4 hrs PRN for moderate-severe pain  - Senna 2 tablets BID, titrate to effect  - Preparation H, apply externally   - Sitz bath PRN  - MR rectum not available as an inpatient. MRI pelvis demonstrated mild diffuse increase signal throughout the pelvic muscles possibly related to inflammation. No obvious sinus tract or fluid collection in the anus or ischial anal fossa.  - Will continue to monitor clinically. Seems likely due to ongoing neutropenia and likely has some level of mucositis.      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, received cranial radiation 6/5/2023 to 6/16/2023  - Testicular disease negative at diagnosis  - Several complications throughout therapy to include  severe septic shock 12/27/2022 while in Delayed Intensification  - Completed therapy 5/30/2024  - Seen in clinic 2/27/2025: WBC 1000 cells/uL, Hgb 10.6 g/dL, platelets 53,000 cells/uL, ANC 10, , absolute monocyte count 20   - Precipitous drop of counts just prior to dx with mildly elevated temperatures and bone pain had concern for relapsed leukemia   - Admitted for Fever and Neutropenia 2/27/2025 and relapse was confirmed  - Double-lumen Broviac line placement, diagnostic bone marrow evaluation to include aspirate and biopsy, flow cytometry and FISH to confirm relapse at bedside 2/28/2025  - Testicular negative at relapse  - CSF negative consistent with CNS1  - Confirmed 13% blasts in hemodilute BMA specimen by flow  - Confirmed BCR-ABL1 fusion in 17% cells by FISH  - Discussions had between primary oncologist and transplant colleagues regarding planning consolidative cellular therapy. Likely plan for HSCT, search for MUD ongoing. After discussing multiple options, they decided on a 3-Drug Re-Induction (VCR/PRED/PEG-ASP) + Imatinib followed by blinatumomab. 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 Met virtually with Dr. Adrian, [facility] BMT 3/20/2025  - For CINV: Zofran 8 mg IV PRN, Ativan 1 mg IV PRN       3) Individualized Salvage Therapy, 3-Drug Re-Induction, Day 23:  ** Methotrexate 15 mg IT x 1 on Days 1 (COMPLETE, see separate procedure note)  ** Vincristine 2 mg IV x 1 on Days 1 (COMPLETE), 8 (COMPLETE), 15 (COMPLETE) and 22 (COMPLETE)  ** Prednisone 30 mg/m2/dose = 60 mg PO BID x 28 days   ** PEG-Aspargase 2000 IU/m2 x 1 on Day 4 in OPIC (COMPLETE)   ** Imatinib 400 mg PO QAM and 250 mg PO QHS (started 3/6/2025, given resistence, discontinued)  ** Dasatinib 70 mg PO BID, started 3/22/2025  ** On Pepcid BID while on corticosteroids. However, pharmacist called  to report that pepcid has been known to decrease the efficacy of Dasatinib. Hence, switched to Tums and  to be  by 2 hrs around Dasatinib administration.   ** Will discontinue Protonix today given significant improvement/resolution of abdominal pain and discomfort         4) Pancytopenia Secondary to leukemia and therapy:  -  cells/uL, Hgb 8.0 g/dL, platelets 30,000 cells/uL  - /microliters, /microliters, absolute monocyte count 20/microliters,  BLASTS: 0%  - Transfuse irradiated PRBC for Hgb<7 g/dL or symptomatic. Monitor his energy level a s it associates with his hgb as this is his first cycle of re-induction chemo.    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 No clinical lesions  - Consider levofloxacin PO as outpatient if has prolonged neutropenia     6) FEN/GI:  - Regular diet  - IVF at 100 ml/hr  - CMP on Mon/Thurs, BMP other days     7) Hyperglycemia, Steroid-Induced:  - Moderately elevated blood sugars. 124 this am  - No medicinal insulin at this time. Can check intermittent Udip.      8) Transaminitis Secondary To Therapy: (IMPROVED)  - AST: 56 U/L, ALT: 170 U/L 3/25/2025  - Bilirubin total 1.6 mg/dL 3/25/2025  - Will continue to monitor  - Monday-Thursday hepatic labs     9) Central Access:  - Double-lumen CVL placed 2/28/2025 by surgery  - Saline flush per protocol     10) Psychosocial:  - Dr. Ortega following     11) Social:              - Referred to Social Work and financial navigator     Disposition: Remain inpatient for treatment of bacteremia and F&N.    Wenceslao Hopper M.D.  Pediatric Hematology / Oncology  [facility]  Office. [phone]

## 2025-03-30 NOTE — PROGRESS NOTES
"Pediatric Hematology/Oncology  Daily Progress Note      Patient Name:  Tomas Jean-Baptiste  : 2000  MRN: [id number]    Location of Service:  [facility]  Date of Service: 3/28/2025  Time: 3:00 PM    Hospital Day: 8    Protocol / Treatment Plan: Individualized Re-Induction chemotherapy, 3 Drug + Tyrosine Kinase Inhibitor, Day 23    SUBJECTIVE:     No acute events overnight.  Afebrile.  This morning, Tomas Roy reports that he is struggling.  He reports that he is tired.  He does not report however any symptoms of headache or neurologic changes.  He reports that his vision has improved from yesterday.  No complaints of any mouth sores, mouth pain, sore throat or difficulty with swallowing.  Appetite is down slightly.  Not complaining of any nausea, vomiting, diarrhea constipation.  Reports that defecation is no longer painful.  No reports of any skin changes or rashes.  No aches or pains.  No other concerns or complaints at this time.    Review of Systems:     Constitutional: Afebrile.  Yesterday with clinical improvement, today regression back to Thursday.    HENT: Negative.  Eyes: No eye complaints today.  No blurred vision.  Respiratory: Negative for shortness of breath.  No cough.  Cardiovascular: Negative.  Gastrointestinal: Not complaining of nausea or vomiting today.  No longer with abdominal pain.  No longer with pain upon defecation.    Genitourinary: Negative.  Musculoskeletal: Negative for arm pain or leg pain.    Skin: Negative for rash or skin infection.  Neurological: Negative for numbness, tingling, sensory changes, weakness or headaches.    Endo/Heme/Allergies: No bruising/bleeding easily.    Psychiatric/Behavioral: Depressed mood.     OBJECTIVE:     Max Temp:Temp (24hrs), Av.7 °C (98.1 °F), Min:36.3 °C (97.3 °F), Max:37.1 °C (98.8 °F)    Vitals: /67   Pulse 80   Temp 36.9 °C (98.5 °F) (Oral)   Resp 17   Ht 1.651 m (5' 5\")   Wt 72.3 kg (159 lb 6.3 " oz)   SpO2 99%   BMI 26.52 kg/m²     I/O:   Intake/Output Summary (Last 24 hours) at 3/29/2025 2250  Last data filed at 3/29/2025 0837  Gross per 24 hour   Intake 260 ml   Output 450 ml   Net -190 ml     Labs:     Latest Reference Range & Units 03/28/25 00:00 03/28/25 09:39   WBC 4.8 - 10.8 K/uL 0.8 (LL)    RBC 4.70 - 6.10 M/uL 2.28 (L)    Hemoglobin 14.0 - 18.0 g/dL 6.5 (L)    Hematocrit 42.0 - 52.0 % 19.1 (L)    MCV 81.4 - 97.8 fL 83.8    MCH 27.0 - 33.0 pg 28.5    MCHC 32.3 - 36.5 g/dL 34.0    RDW 35.9 - 50.0 fL 42.6    Platelet Count 164 - 446 K/uL 33 (L)    MPV 9.0 - 12.9 fL 10.4    Neutrophils-Polys 44.00 - 72.00 % 88.30 (H)    Neutrophils (Absolute) 1.82 - 7.42 K/uL 0.71 (L)    Lymphocytes 22.00 - 41.00 % 11.70 (L)    Lymphs (Absolute) 1.00 - 4.80 K/uL 0.09 (L)    Monocytes 0.00 - 13.40 % 0.00    Monos (Absolute) 0.00 - 0.85 K/uL 0.00    Eosinophils 0.00 - 6.90 % 0.00    Eos (Absolute) 0.00 - 0.51 K/uL 0.00    Basophils 0.00 - 1.80 % 0.00    Baso (Absolute) 0.00 - 0.12 K/uL 0.00    Nucleated RBC 0.00 - 0.20 /100 WBC 0.00    NRBC (Absolute) K/uL 0.00    Plt Estimation  Decreased    Imm. Plt Fraction 0.6 - 13.1 % 5.7    RBC Morphology  Normal    Peripheral Smear Review  see below    Manual Diff Status  PERFORMED    Comment  See Comment    Sodium 135 - 145 mmol/L  137   Potassium 3.6 - 5.5 mmol/L  3.7   Chloride 96 - 112 mmol/L  105   Co2 20 - 33 mmol/L  25   Anion Gap 7.0 - 16.0   7.0   Glucose 65 - 99 mg/dL  179 (H)   Bun 8 - 22 mg/dL  9   Creatinine 0.50 - 1.40 mg/dL  0.40 (L)   GFR (CKD-EPI) >60 mL/min/1.73 m 2  157   Calcium 8.5 - 10.5 mg/dL  7.1 (L)   (LL): Data is critically low  (L): Data is abnormally low  (H): Data is abnormally high    Blood cultures obtained 3/22/2025 positive from both lines with no differential time to positivity.  Currently growing gram-positive cocci in clusters    3/24/2025, blood cultures speciated as Rothia mucilaginosa .  Rothia is historically difficult to isolate and  speciate.  Hospital and Pediatric Oncology service with previous patients that have had resistant Rothia bacteremias requiring either vancomycin or daptomycin.  Will attempt to get susceptibilities and isolate.  In the meantime, continue with vancomycin.    3/25/2025 blood cultures NGTD    Physical Exam:    Constitutional: Does not appear clinically worse however mood is flat and withdrawn today.  Tired appearing today.   HENT: Normocephalic and atraumatic.  No rhinorrhea. Oropharynx is clear and moist.  No sores.  Eyes: Conjunctivae are normal. EOMI. Non-icteric. Pupils equal and round.  Neck: Normal range of motion of neck, no adenopathy.    Cardiovascular: Normal rate, regular rhythm.  No murmur. DP/radial pulses 2+, cap refill < 2 sec.  Pulmonary/Chest: Effort normal. No respiratory distress. Symmetric expansion.  No crackles or wheezes.  Central Line C/D/I  Abdomen: Soft. Bowel sounds are normal. No distension and no mass. There is no hepatosplenomegaly.    Genitourinary:  Deferred.  Musculoskeletal: Normal range of motion of lower and upper extremities bilaterally.   Neurological: Alert and oriented to person and place. Exhibits normal muscle tone bilaterally in upper and lower extremities. Gait not assessed.  Skin: Skin is warm, dry and pink.  No rash or evidence of skin infection.   Psychiatric: Mood is depressed.    ASSESSMENT AND PLAN:     Tomas Jean-Baptiste is a 24 y.o. 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    1) Rothia mucilaginosa Bacteremia:   - Blood cultures obtained on presentation 3/22/2025 from CVL (both lumens) growing Rothia mucilaginosa    - No differential time to positivity, inconsistent with CLABSI   - Was placed on empiric cefepime and Flagyl upon admission (continue empirically)   - Hospital and Pediatric Heme/Onc has had previous patients with Rothia  infections resistant to broad-spectrum antibiotics   - Will attempt to send susceptibilities   - Will obtain repeat cultures to assess for response  - cultures obtained 3/25/2025 NGTD    - Hemodynamically stable and afebrile, will continue to monitor closely    2) Constipation/Rectal Pain/Pain with Defecation/Hematochezia: (RESOLVED)  - Concern for internal hemorrhoids vs abscess/fistula  - Blood culture as above, no gram-negative organisms identified  - Cefepime 2 mg IV every 8 hrs empirically   - Flagyl discontinued 3/27/2025 given no MRI evidence of fistula or abscess  - Tylenol every 6 hrs PRN for mild pain  - Morphine 2 mg IV every 3 hrs PRN for moderate-severe pain  - Oxycodone 5-10 mg PO every 4 hrs PRN for moderate-severe pain  - Senna 2 tablets BID, titrate to effect  - Preparation H, apply externally   - Sitz bath PRN  - MRI Pelvis ordered but not obtained as radiology indicating that MRI-RECTUM would need to be ordered as an outpatient on a 3T scanner  - Given that pain is also located in the lower abdomen, we will forego MRI-RECTUM and continue with MRI-PELVIS.  - MRI pelvis demonstrated mild diffuse increase signal throughout the pelvic muscles possibly related to inflammation.  No apparent sinus tract or fluid collection in the anus or ischial anal fossa.    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, received cranial radiation 6/5/2023 to 6/16/2023  - Testicular disease negative at diagnosis  - Several complications throughout therapy to include severe septic shock 12/27/2022 while in Delayed Intensification  - Completed therapy 5/30/2024  - Seen in clinic on 1/15/2025 without any evidence of relapse (clinical/laboratory)  - Reported viral URI symptoms approximately 2.5 weeks prior to presentation   - Interval  resolution of viral URI symptoms followed by very acute worsening of flulike symptoms as well as aches and pains  - Seen in clinic 2/27/2025: WBC 1000 cells/uL, Hgb 10.6 g/dL, platelets 53,000 cells/uL, ANC 10, , absolute monocyte count 20   - Precipitous drop of counts over the past month with context of low-grade temperatures and bone pain most consistent with relapsed leukemia   - Admission for Fever and Neutropenia 2/27/2025  - Double-lumen Broviac line placement, diagnostic bone marrow evaluation to include aspirate and biopsy, flow cytometry and FISH to confirm relapse at bedside 2/28/2025  - Testicular negative at relapse  - CSF negative consistent with CNS1  - Confirmed 13% blasts in hemodilute BMA specimen by flow  - Confirmed BCR-ABL1 fusion in 17% cells by FISH  - Discussions with transplant colleagues regarding planning allogeneic transplant / CAR-T therapy   - After discussing multiple options, proposed to patient a 3-Drug Re-Induction (VCR/PRED/PEG-ASP) + Imatinib followed by blinatumomab  - Met virtually with Dr. 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 Adrian - will begin search for donor     5) Individualized Salvage Therapy, 3-Drug Re-Induction, Day 23:  ** Methotrexate 15 mg IT x 1 on Days 1 (COMPLETE, see separate procedure note)  ** Vincristine 2 mg IV x 1 on Days 1 (COMPLETE), 8 (COMPLETE), 15 (COMPLETE) and 22 (COMPLETE)  ** Prednisone 30 mg/m2/dose = 60 mg PO BID x 28 days   ** PEG-Aspargase 2000 IU/m2 x 1 on Day 4 in OPIC (COMPLETE)   ** Imatinib 400 mg PO QAM and 250 mg PO QHS (started 3/6/2025, given resistence, discontinued)  ** Dasatinib 70 mg PO BID, started 3/22/2025  ** On Pepcid BID while on corticosteroids. However, pharmacist called  to report that pepcid has been known to  decrease the efficacy of Dasatinib. Hence, switched to Tums and to be  by 2 hrs around Dasatinib administration.   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:  -  cells/uL, Hgb 8.0 g/dL, platelets 30,000 cells/uL  - /microliters, /microliters, absolute monocyte count 20/microliters,  BLASTS: 0%  - Transfuse irradiated PRBC for Hgb<7 g/dL or symptomatic  - Transfuse irradiated platelets for platelet count of <10,000 cells/uL or symptomatic    - No transfusions indicated today    - Neutropenic precautions     - CBC daily            7) At Risk for Opportunistic Pulmonary Infection:  - Heavily pretreated  - Bactrim -160 mg PO BID Sat/Sun  - HSV1 + IgG, 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  - Consider levofloxacin PO as outpatient for prolonged neutropenia     9) FEN/GI:  - Regular diet  - IVF at 100 ml/hr  - BMP daily     10) At Risk For Chemotherapy Induced Nausea and Vomiting:  - Zofran 8 mg IV PRN ordered  - Ativan 1 mg IV PRN ordered      11) Hyperglycemia, Steroid Induced:  - Moderate  -  mg/dL today (no sugars > 200 mg/dL)  - Moderately elevated blood sugars  - Monitor for need for insulin     12) Transaminitis Secondary To Therapy: (IMPROVED)  - AST: 56 U/L, ALT: 170 U/L 3/25/2025  - Bilirubin total 1.6 mg/dL 3/25/2025    - Will continue to monitor  - Monday-Thursday labs     13) Central Access:  - Double-lumen CVL placed 2/28/2025 by surgery  - Saline flush per protocol    14) Psychosocial:  - Dr. Ortega following     15) Social:    - Referred to Social Work and financial navigator     Disposition: Remain inpatient for treatment of bacteremia.    Pepe Faye MD  Pediatric Hematology / Oncology  [facility]  Cell.  [phone]  Office. [phone]

## 2025-03-31 VITALS
TEMPERATURE: 98.2 F | WEIGHT: 159.39 LBS | BODY MASS INDEX: 26.56 KG/M2 | OXYGEN SATURATION: 96 % | HEART RATE: 65 BPM | DIASTOLIC BLOOD PRESSURE: 61 MMHG | SYSTOLIC BLOOD PRESSURE: 124 MMHG | RESPIRATION RATE: 16 BRPM | HEIGHT: 65 IN

## 2025-03-31 LAB
ALBUMIN SERPL BCP-MCNC: 2.1 G/DL (ref 3.2–4.9)
ALBUMIN/GLOB SERPL: 1.4 G/DL
ALP SERPL-CCNC: 116 U/L (ref 30–99)
ALT SERPL-CCNC: 146 U/L (ref 2–50)
ANION GAP SERPL CALC-SCNC: 8 MMOL/L (ref 7–16)
AST SERPL-CCNC: 55 U/L (ref 12–45)
BASOPHILS # BLD AUTO: 0 % (ref 0–1.8)
BASOPHILS # BLD: 0 K/UL (ref 0–0.12)
BILIRUB SERPL-MCNC: 0.7 MG/DL (ref 0.1–1.5)
BUN SERPL-MCNC: 8 MG/DL (ref 8–22)
CALCIUM ALBUM COR SERPL-MCNC: 8.7 MG/DL (ref 8.5–10.5)
CALCIUM SERPL-MCNC: 7.2 MG/DL (ref 8.5–10.5)
CHLORIDE SERPL-SCNC: 102 MMOL/L (ref 96–112)
CO2 SERPL-SCNC: 24 MMOL/L (ref 20–33)
COMMENT NL1176: NORMAL
CREAT SERPL-MCNC: 0.38 MG/DL (ref 0.5–1.4)
EOSINOPHIL # BLD AUTO: 0 K/UL (ref 0–0.51)
EOSINOPHIL NFR BLD: 0 % (ref 0–6.9)
ERYTHROCYTE [DISTWIDTH] IN BLOOD BY AUTOMATED COUNT: 44.3 FL (ref 35.9–50)
GFR SERPLBLD CREATININE-BSD FMLA CKD-EPI: 159 ML/MIN/1.73 M 2
GLOBULIN SER CALC-MCNC: 1.5 G/DL (ref 1.9–3.5)
GLUCOSE SERPL-MCNC: 123 MG/DL (ref 65–99)
HCT VFR BLD AUTO: 24.8 % (ref 42–52)
HGB BLD-MCNC: 8.7 G/DL (ref 14–18)
LYMPHOCYTES # BLD AUTO: 0.47 K/UL (ref 1–4.8)
LYMPHOCYTES NFR BLD: 59 % (ref 22–41)
MANUAL DIFF BLD: NORMAL
MCH RBC QN AUTO: 29.5 PG (ref 27–33)
MCHC RBC AUTO-ENTMCNC: 35.1 G/DL (ref 32.3–36.5)
MCV RBC AUTO: 84.1 FL (ref 81.4–97.8)
MONOCYTES # BLD AUTO: 0.12 K/UL (ref 0–0.85)
MONOCYTES NFR BLD AUTO: 15.4 % (ref 0–13.4)
MORPHOLOGY BLD-IMP: NORMAL
NEUTROPHILS # BLD AUTO: 0.2 K/UL (ref 1.82–7.42)
NEUTROPHILS NFR BLD: 25.6 % (ref 44–72)
NRBC # BLD AUTO: 0.05 K/UL
NRBC BLD-RTO: 6.3 /100 WBC (ref 0–0.2)
PLATELET # BLD AUTO: 45 K/UL (ref 164–446)
PLATELET BLD QL SMEAR: NORMAL
PLATELETS.RETICULATED NFR BLD AUTO: 9.9 % (ref 0.6–13.1)
PMV BLD AUTO: 12 FL (ref 9–12.9)
POIKILOCYTOSIS BLD QL SMEAR: NORMAL
POTASSIUM SERPL-SCNC: 3.7 MMOL/L (ref 3.6–5.5)
PROT SERPL-MCNC: 3.6 G/DL (ref 6–8.2)
RBC # BLD AUTO: 2.95 M/UL (ref 4.7–6.1)
RBC BLD AUTO: PRESENT
SMUDGE CELLS BLD QL SMEAR: NORMAL
SODIUM SERPL-SCNC: 134 MMOL/L (ref 135–145)
TARGETS BLD QL SMEAR: NORMAL
WBC # BLD AUTO: 0.8 K/UL (ref 4.8–10.8)

## 2025-03-31 PROCEDURE — A9270 NON-COVERED ITEM OR SERVICE: HCPCS | Performed by: PEDIATRICS

## 2025-03-31 PROCEDURE — 99233 SBSQ HOSP IP/OBS HIGH 50: CPT | Performed by: PEDIATRICS

## 2025-03-31 PROCEDURE — 80053 COMPREHEN METABOLIC PANEL: CPT

## 2025-03-31 PROCEDURE — 85055 RETICULATED PLATELET ASSAY: CPT

## 2025-03-31 PROCEDURE — 85027 COMPLETE CBC AUTOMATED: CPT

## 2025-03-31 PROCEDURE — 700111 HCHG RX REV CODE 636 W/ 250 OVERRIDE (IP): Mod: JZ | Performed by: PEDIATRICS

## 2025-03-31 PROCEDURE — 700102 HCHG RX REV CODE 250 W/ 637 OVERRIDE(OP): Performed by: PEDIATRICS

## 2025-03-31 PROCEDURE — 700105 HCHG RX REV CODE 258: Performed by: PEDIATRICS

## 2025-03-31 PROCEDURE — 700111 HCHG RX REV CODE 636 W/ 250 OVERRIDE (IP): Performed by: PEDIATRICS

## 2025-03-31 PROCEDURE — 770004 HCHG ROOM/CARE - ONCOLOGY PRIVATE *

## 2025-03-31 PROCEDURE — 85007 BL SMEAR W/DIFF WBC COUNT: CPT

## 2025-03-31 RX ADMIN — VANCOMYCIN HYDROCHLORIDE 1500 MG: 5 INJECTION, POWDER, LYOPHILIZED, FOR SOLUTION INTRAVENOUS at 13:58

## 2025-03-31 RX ADMIN — CHLORHEXIDINE GLUCONATE, 0.12% ORAL RINSE 15 ML: 1.2 SOLUTION DENTAL at 08:07

## 2025-03-31 RX ADMIN — PREDNISONE 60 MG: 50 TABLET ORAL at 08:07

## 2025-03-31 RX ADMIN — CHLORHEXIDINE GLUCONATE, 0.12% ORAL RINSE 15 ML: 1.2 SOLUTION DENTAL at 12:04

## 2025-03-31 RX ADMIN — HYDROCORTISONE: 1 CREAM TOPICAL at 06:36

## 2025-03-31 RX ADMIN — CHLORHEXIDINE GLUCONATE, 0.12% ORAL RINSE 15 ML: 1.2 SOLUTION DENTAL at 17:09

## 2025-03-31 RX ADMIN — ANTACID TABLETS 1000 MG: 500 TABLET, CHEWABLE ORAL at 08:07

## 2025-03-31 RX ADMIN — Medication 1 APPLICATOR: at 06:36

## 2025-03-31 RX ADMIN — VANCOMYCIN HYDROCHLORIDE 1500 MG: 5 INJECTION, POWDER, LYOPHILIZED, FOR SOLUTION INTRAVENOUS at 02:01

## 2025-03-31 RX ADMIN — ONDANSETRON 8 MG: 2 INJECTION INTRAMUSCULAR; INTRAVENOUS at 20:59

## 2025-03-31 RX ADMIN — PREDNISONE 60 MG: 50 TABLET ORAL at 21:00

## 2025-03-31 RX ADMIN — LORAZEPAM 1 MG: 2 INJECTION INTRAMUSCULAR; INTRAVENOUS at 21:06

## 2025-03-31 RX ADMIN — SODIUM CHLORIDE: 9 INJECTION, SOLUTION INTRAVENOUS at 14:00

## 2025-03-31 RX ADMIN — LORAZEPAM 1 MG: 2 INJECTION INTRAMUSCULAR; INTRAVENOUS at 08:59

## 2025-03-31 RX ADMIN — ANTACID TABLETS 1000 MG: 500 TABLET, CHEWABLE ORAL at 12:03

## 2025-03-31 RX ADMIN — ANTACID TABLETS 1000 MG: 500 TABLET, CHEWABLE ORAL at 17:07

## 2025-03-31 RX ADMIN — DASATINIB 70 MG: 70 TABLET ORAL at 06:36

## 2025-03-31 RX ADMIN — CEFEPIME 2 G: 2 INJECTION, POWDER, FOR SOLUTION INTRAVENOUS at 21:02

## 2025-03-31 RX ADMIN — DASATINIB 70 MG: 70 TABLET ORAL at 17:07

## 2025-03-31 RX ADMIN — Medication 1 APPLICATOR: at 17:09

## 2025-03-31 RX ADMIN — CEFEPIME 2 G: 2 INJECTION, POWDER, FOR SOLUTION INTRAVENOUS at 06:35

## 2025-03-31 RX ADMIN — ONDANSETRON 8 MG: 2 INJECTION INTRAMUSCULAR; INTRAVENOUS at 13:23

## 2025-03-31 RX ADMIN — HYDROCORTISONE: 1 CREAM TOPICAL at 17:09

## 2025-03-31 RX ADMIN — OXYCODONE HYDROCHLORIDE 5 MG: 5 TABLET ORAL at 08:07

## 2025-03-31 RX ADMIN — ONDANSETRON 8 MG: 2 INJECTION INTRAMUSCULAR; INTRAVENOUS at 06:36

## 2025-03-31 RX ADMIN — CEFEPIME 2 G: 2 INJECTION, POWDER, FOR SOLUTION INTRAVENOUS at 13:23

## 2025-03-31 RX ADMIN — VANCOMYCIN HYDROCHLORIDE 1500 MG: 5 INJECTION, POWDER, LYOPHILIZED, FOR SOLUTION INTRAVENOUS at 21:00

## 2025-03-31 RX ADMIN — VANCOMYCIN HYDROCHLORIDE 1500 MG: 5 INJECTION, POWDER, LYOPHILIZED, FOR SOLUTION INTRAVENOUS at 08:06

## 2025-03-31 RX ADMIN — Medication 1000 UNITS: at 06:36

## 2025-03-31 ASSESSMENT — PAIN DESCRIPTION - PAIN TYPE
TYPE: ACUTE PAIN
TYPE: ACUTE PAIN

## 2025-03-31 NOTE — PROGRESS NOTES
Pediatric Hematology/Oncology  Inpatient Progress Note      Patient Name:  Tomas Jean-Baptiste  : 2000   MRN: [id number]    Location of Service: [facility]    Date of Service: 3/31/2025  Time: 9:20 AM    Primary Care Physician: Margarito Arvizu M.D.    Subjective:   He received lorazepam and is very sleepy.   He still has tenesmus. No hematochezia, melena.   Afebrile all of yesterday.          Review of Systems:   Constitutional: Afebrile overnight.    HENT: Negative for ear pain, nasal congestion or rhinorrhea, nosebleeds and sore throat.  No mouth sores.  Eyes: Negative for visual changes.  Respiratory: Negative for shortness of breath or noisy breathing.   Cardiovascular: Negative for chest pain or extremity swelling.    Gastrointestinal: +Nausea that is controlled with meds. Negative for abdominal pain, diarrhea. +tenesmus, but no constipation or blood in stool.  Genitourinary: Negative for painful urination, blood in urine or flank pain.    Musculoskeletal: Negative for joint or muscle pains.    Skin: Negative for rash, signs of infection.  Neurological: Negative for numbness, tingling, sensory changes, weakness or headaches.    Endo/Heme/Allergies: Does not bruise/bleed easily.    Psychiatric/Behavioral: No changes in mood, appropriate for age.     PAST MEDICAL HISTORY:   Surgical History:   Past Surgical History:   Procedure Laterality Date    PB REMOVAL TUNNELED CV CATH N/A 2024    Procedure: PORT REMOVAL;  Surgeon: Simona Moise M.D.;  Location: [facility];  Service: General    INCISION AND DRAINAGE GENERAL Left 2023    Procedure: INCISION AND DRAINAGE OF LEFT SHOULDER;  Surgeon: Luke Haddad M.D.;  Location: [facility];  Service: General    CATH PLACEMENT Right 2022    Procedure: INSERTION, CATHETER;  Surgeon: Simona Moise M.D.;  Location: [facility];  Service: Ent        Allergies:   Allergies   Allergen  "Reactions    Amoxicillin Rash     Reacted as an infant. No swelling or airway problems.  Tolerated cefepime June 2022       Medications:   No current facility-administered medications on file prior to encounter.     Current Outpatient Medications on File Prior to Encounter   Medication Sig Dispense Refill    dasatinib (SPRYCEL) 70 MG tablet Take 1 Tablet by mouth 2 times a day for 30 days. 60 Tablet 0    predniSONE (DELTASONE) 20 MG Tab Take 3 Tablets by mouth 2 times a day for 25 days. 150 Tablet 0    famotidine (PEPCID) 20 MG Tab Take 1 Tablet by mouth 2 times a day. 60 Tablet 0    sulfamethoxazole-trimethoprim (BACTRIM DS) 800-160 MG tablet Take 1 Tablet by mouth 2 times a day. (Patient taking differently: Take 1 Tablet by mouth 2 times a day. On the weekends only) 16 Tablet 3    senna-docusate (SENNA-S) 8.6-50 MG Tab Take 1 Tablet by mouth every day. 30 Tablet 0    ondansetron (ZOFRAN ODT) 4 MG TABLET DISPERSIBLE Take 2 Tablets by mouth every 6 hours as needed for Nausea/Vomiting. 20 Tablet 3    mupirocin calcium (BACTROBAN) 2 % Cream Apply 1 Application topically 2 times a day. (Patient taking differently: Apply 1 Application topically 2 times a day. Will finish rx prior to procedure.) 15 g 0       OBJECTIVE:     Vitals:   Ambulatory Vitals  Encounter Vitals  Temperature: 36.8 °C (98.3 °F)  Temp src: Oral  Blood Pressure: 104/53  BP Location: Right, Upper Arm  Patient BP Position: Flores's Position  Pulse: 78  Respiration: 16  Pulse Oximetry: 98 %  O2 (LPM): 0  O2 Delivery Device: None - Room Air  Weight: 72.3 kg (159 lb 6.3 oz)  Weight Source: Stand Up Scale  Height: 165.1 cm (5' 5\")  BMI (Calculated): 26.52  Location: Chest  Description: Aching    Labs:    No results displayed because visit has over 200 results.          Physical Exam:  Constitutional: NT in NAD.   HENT: Normocephalic and atraumatic. No nasal congestion or rhinorrhea. Oropharynx is clear and moist. No oral ulcerations or sores.    Eyes: " Conjunctivae are normal. Pupils are equal, round, and reactive to light.    Neck: Normal range of motion of neck, no adenopathy.    Cardiovascular: Normal rate, regular rhythm and normal heart sounds.  I/VI sys flow murmur best heard @LSB. DP/radial pulses 2+, cap refill < 2 sec  Pulmonary/Chest: Effort normal and breath sounds normal. No respiratory distress. Symmetric expansion.  No crackles or wheezes.  Abdomen: Soft. Bowel sounds are normal. No distension and no mass. There is no hepatosplenomegaly.    Genitourinary:  Deferred  Musculoskeletal: Normal range of motion of lower and upper extremities bilaterally. No tenderness to palpation of elbows, wrists, hands, knees, ankles and feet bilaterally.   Lymphadenopathy: No cervical adenopathy, axillary adenopathy or inguinal adenopathy.   Neurological: Alert and oriented to person and place. Exhibits normal muscle tone bilaterally in upper and lower extremities. Gait normal. Coordination normal.  C/o neuropathy from prior therapy..   Skin: Skin is warm, dry and pink.  No rash or evidence of skin infection.    Psychiatric: Mood and affect normal for age.  Karnofsky: 80      ASSESSMENT AND PLAN:   Tomas Jean-Baptiste is a 24 y.o. 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               - Hemodynamically stable and afebrile, will continue to monitor closely              - Vanco trough OK 3/27. Recheck later this  week. Monitor creatinine.   - Continue abx until he has more reliable blood counts, specifically his ANC.     2) Constipation/Rectal Pain/Pain with Defecation/Hematochezia:   - Pain was resolved for a day. No longer as severe, but still recurs when passing stool.   - Morphine 2 mg IV every 3 hrs PRN for moderate-severe pain not controlled by oxycodone.   - Oxycodone 5-10 mg PO every 4 hrs PRN for moderate-severe pain  - Senna 2 tablets BID, titrate to effect  - Preparation H, apply externally   - Sitz bath PRN  - MR rectum not available as an inpatient. MRI pelvis demonstrated mild diffuse increase signal throughout the pelvic muscles possibly related to inflammation. No obvious sinus tract or fluid collection in the anus or ischial anal fossa.  - Will continue to monitor clinically. Seems likely due to ongoing neutropenia and likely has some level of mucositis.      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 Admitted for Fever and Neutropenia 2/27/2025 and relapse was confirmed  - Double-lumen Broviac line placement, diagnostic bone marrow evaluation to include aspirate and biopsy, flow cytometry and FISH to confirm relapse at bedside 2/28/2025  - Testicular negative at relapse  - CSF negative consistent with CNS1  - Confirmed 13% blasts in hemodilute BMA specimen by flow  - Confirmed BCR-ABL1  fusion in 17% cells by FISH  - Discussions had between primary oncologist and transplant colleagues regarding planning consolidative cellular therapy. Likely plan for HSCT, search for MUD ongoing. After discussing multiple options, they decided on a 3-Drug Re-Induction (VCR/PRED/PEG-ASP) +Imatinib followed by blinatumomab. 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 Met virtually with Dr. Adrian, [place] BMT 3/20/2025  - For CINV: Zofran 8 mg IV PRN, Ativan 1 mg IV PRN       3) Individualized Salvage Therapy, 3-Drug Re-Induction, Day 23:  ** Methotrexate 15 mg IT x 1 on Days 1 (COMPLETE, see separate procedure note)  ** Vincristine 2 mg IV x 1 on Days 1 (COMPLETE), 8 (COMPLETE), 15 (COMPLETE) and 22 (COMPLETE)  ** Prednisone 30 mg/m2/dose = 60 mg PO BID x 28 days   ** PEG-Aspargase 2000 IU/m2 x 1 on Day 4 in OPIC (COMPLETE)   ** Imatinib 400 mg PO QAM and 250 mg PO QHS (started 3/6/2025, given resistence, discontinued)  ** Dasatinib 70 mg PO BID, started 3/22/2025  ** On Pepcid BID while on corticosteroids. However, pharmacist called  to report that pepcid has been known to decrease the efficacy of Dasatinib. Hence, switched to Tums and to be  by 2 hrs around Dasatinib administration.   ** Will discontinue Protonix today given significant improvement/resolution of abdominal pain and discomfort      4) Pancytopenia Secondary to leukemia and therapy:  - Follow CBC w/diff daily. ANC down to 200 today.   - Transfuse irradiated PRBC for Hgb<7 g/dL or symptomatic. Monitor his energy level a s it associates with his hgb as this is his first cycle of re-induction chemo.    - Transfuse irradiated platelets for platelet count of <10,000 cells/uL or symptomatic              - No transfusions indicated today   - Neutropenic precautions            5) At Risk for Opportunistic Infections:  - Bactrim -160 mg PO BID Sat/Sun for pneumocystis ppx  - HSV1 + IgG, not currently on  acyclovir. No clinical lesions  - Consider levofloxacin PO as outpatient if has prolonged neutropenia     6) FEN/GI:  - Regular diet  - IVF at 100 ml/hr  - CMP on Mon/Thurs, BMP other days     7) Hyperglycemia, Steroid-Induced:  - Moderately elevated blood sugars. 124, 123 the past 2 am's  - No medicinal insulin at this time. Can check intermittent Udip.      8) Transaminitis Secondary To Therapy:   - AST: 55 U/L, ALT: 146 U/L 3/31/2025 stable if not improved.   - Bilirubin total 0.7 mg/dL 3/31/2025  - Will continue to monitor  - Monday-Thursday hepatic labs     9) Central Access:  - Double-lumen CVL placed 2/28/2025 by surgery  - Saline flush per protocol     10) Psychosocial:  - Dr. Ortega following     11) Social:              - Referred to Social Work and financial navigator   - Rounded with psy today who knows the patient well     Disposition: Remain inpatient for treatment of bacteremia and F&N.      Wenceslao Hopper M.D.  Pediatric Hematology / Oncology  [facility]  Office. [phone]    1. Acute lymphoblastic leukemia (ALL) in relapse (HCC)  - MD Alert...Vancomycin per Pharmacy  - MD Alert...Vancomycin per Pharmacy  - MD Alert...Vancomycin per Pharmacy    2. Bacteremia  - MD Alert...Vancomycin per Pharmacy  - MD Alert...Vancomycin per Pharmacy  - MD Alert...Vancomycin per Pharmacy    3. Nausea  - scopolamine (Transderm-Scop) patch 1 Patch  - ondansetron (Zofran) syringe/vial injection 8 mg    4. Abdominal pain, unspecified abdominal location    5. Central line complication, initial encounter  - alteplase (Cathflo) syringe *Central Line Only* 2 mg    6. Hypovitaminosis D  - vitamin D3 (Cholecalciferol) tablet 1,000 Units    7. 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 Hypocalcemia  - calcium carbonate (Tums) chewable tab 1,000 mg    Other orders  - Admission Order: Patient will be  admitted to the appropriate Med/Surg, CDU, Obstetrics or Pediatric Unit (Excludes ICU, IMC or Telemetry); Standing  - Full code; Standing  - Vital signs per nursing policy; Standing  - Notify Primary Care Physician of patient arrival and document name of Physician and office contacted in progress notes; Standing  - RN to place diet per age order based on patient needs/assessment; Standing  - Activity/Weight-bearing/Mobility Order- No Restrictions; Activity as Tolerated; Standing  - Height and Weight; Standing  - Head circumference measurement upon Admisssion; Standing  - Intake And Output; Standing  - lidocaine-prilocaine (Emla) 2.5-2.5 % cream  - NS infusion  - Admission Order: Patient will be admitted to the appropriate Med/Surg, CDU, Obstetrics or Pediatric Unit (Excludes ICU, IMC or Telemetry)  - Full code  - Vital signs per nursing policy  - Notify Primary Care Physician of patient arrival and document name of Physician and office contacted in progress notes  - RN to place diet per age order based on patient needs/assessment  - Activity/Weight-bearing/Mobility Order- No Restrictions; Activity as Tolerated  - Height and Weight  - Head circumference measurement upon Admisssion  - Intake And Output  - Respiratory Oximetry (PEDS/NICU) Spot Check  - cefepime (Maxipime) 2 g in  mL IVPB  - acetaminophen (Tylenol) tablet 650 mg  - COD - Adult (Type and Screen); Standing  - COD - Adult (Type and Screen)  - Blood Culture; Standing  - Blood Culture; Standing  - Comp Metabolic Panel; Standing  - MAGNESIUM; Standing  - PHOSPHORUS; Standing  - Blood Culture  - Blood Culture  - Comp Metabolic Panel  - MAGNESIUM  - PHOSPHORUS  - morphine 4 MG/ML injection 2 mg  - oxyCODONE immediate-release (Roxicodone) tablet 5-10 mg; Refill: 0  - senna-docusate (Pericolace Or Senokot S) 8.6-50 MG per tablet 2 Tablet  - MR-PELVIS-WITH & W/O AND SEQUENCES; Standing  - MR-PELVIS-WITH & W/O AND SEQUENCES  - dasatinib (Sprycel) TABS 70  mg  - predniSONE (Deltasone) tablet 60 mg  - sulfamethoxazole-trimethoprim (Bactrim DS) 800-160 MG tablet 1 Tablet  - chlorhexidine (Peridex) 0.12 % solution 15 mL  - morphine 4 MG/ML injection 2 mg  - Diet Order Diet: Regular; Standing  - Diet Order Diet: Regular  - K Pad Motor; Standing  - K-Pad 14X20; Standing  - K Pad Motor  - K-Pad 14X20  - ESTIMATED GFR; Standing  - ESTIMATED GFR  - hydrocortisone 1 % cream  - Sitz Bath; Standing  - LORazepam (Ativan) injection 1 mg  - Consent for Blood Transfusion; Standing  - Consent for Iodinated Contrast Administration; Standing  - Consent for Blood Transfusion  - Consent for Iodinated Contrast Administration  - CBC WITH DIFFERENTIAL; Standing  - CBC WITH DIFFERENTIAL  - IP Consult to Case Management; Standing  - IP Consult to Case Management  - Nozin nasal  swab  - DIFFERENTIAL MANUAL; Standing  - DIFFERENTIAL MANUAL  - PERIPHERAL SMEAR REVIEW; Standing  - PERIPHERAL SMEAR REVIEW  - PLATELET ESTIMATE; Standing  - PLATELET ESTIMATE  - MORPHOLOGY; Standing  - MORPHOLOGY  - IMMATURE PLT FRACTION; Standing  - IMMATURE PLT FRACTION  - Verify consent has been obtained; Standing  - Vital signs per blood transfusion policy; Standing  - PEDS RELEASE RED BLOOD CELLS (UNITS); Standing  - PEDS RELEASE PLATELET PHERESIS (UNITS); Standing  - Verify consent has been obtained  - CBC WITH DIFFERENTIAL; Standing  - Basic Metabolic Panel; Standing  - PLATELETS REQUEST; Standing  - PLATELETS REQUEST  - PEDS RELEASE PLATELET PHERESIS (UNITS)  - PEDS RELEASE RED BLOOD CELLS (UNITS)  - CBC WITH DIFFERENTIAL  - Basic Metabolic Panel  - MRSA By PCR (Amp); Standing  - MRSA By PCR (Amp)  - vancomycin (Vancocin) 1,750 mg in  mL IVPB  - Positioner Patient SM [id number] Accerssory; Standing  - Positioner Patient SM [id number] Accerssory  - ESTIMATED GFR; Standing  - ESTIMATED GFR  - DIFFERENTIAL MANUAL; Standing  - DIFFERENTIAL MANUAL  - PERIPHERAL SMEAR REVIEW; Standing  - PERIPHERAL  SMEAR REVIEW  - PLATELET ESTIMATE; Standing  - PLATELET ESTIMATE  - MORPHOLOGY; Standing  - MORPHOLOGY  - IMMATURE PLT FRACTION; Standing  - IMMATURE PLT FRACTION  - CBC WITH DIFFERENTIAL  - DIFFERENTIAL MANUAL; Standing  - DIFFERENTIAL MANUAL  - PERIPHERAL SMEAR REVIEW; Standing  - PERIPHERAL SMEAR REVIEW  - PLATELET ESTIMATE; Standing  - PLATELET ESTIMATE  - MORPHOLOGY; Standing  - MORPHOLOGY  - IMMATURE PLT FRACTION; Standing  - IMMATURE PLT FRACTION  - PEDS RELEASE PLATELET PHERESIS (UNITS); Standing  - PLATELETS REQUEST; Standing  - PLATELETS REQUEST  - VANCOMYCIN PEAK; Standing  - VANCOMYCIN TROUGH; Standing  - VANCOMYCIN PEAK  - PEDS RELEASE PLATELET PHERESIS (UNITS)  - VANCOMYCIN TROUGH  - Comp Metabolic Panel; Standing  - CBC WITH DIFFERENTIAL  - Comp Metabolic Panel  - ESTIMATED GFR; Standing  - ESTIMATED GFR  - DIFFERENTIAL MANUAL; Standing  - DIFFERENTIAL MANUAL  - PERIPHERAL SMEAR REVIEW; Standing  - PERIPHERAL SMEAR REVIEW  - PLATELET ESTIMATE; Standing  - PLATELET ESTIMATE  - MORPHOLOGY; Standing  - MORPHOLOGY  - IMMATURE PLT FRACTION; Standing  - IMMATURE PLT FRACTION  - Basic Metabolic Panel  - RELEASE RED BLOOD CELLS; Standing  - BLOOD CULTURE; Standing  - BLOOD CULTURE; Standing  - BLOOD CULTURE  - BLOOD CULTURE  - VITAMIN D,25 HYDROXY (DEFICIENCY); Standing  - VITAMIN D,25 HYDROXY (DEFICIENCY)  - RELEASE RED BLOOD CELLS  - ESTIMATED GFR; Standing  - ESTIMATED GFR  - VANCOMYCIN TROUGH; Standing  - VANCOMYCIN TROUGH  - gadoteridol (Prohance) injection 15 mL  - vancomycin (Vancocin) 1,500 mg in  mL IVPB  - CBC WITH DIFFERENTIAL  - DIFFERENTIAL MANUAL; Standing  - DIFFERENTIAL MANUAL  - PERIPHERAL SMEAR REVIEW; Standing  - PERIPHERAL SMEAR REVIEW  - PLATELET ESTIMATE; Standing  - PLATELET ESTIMATE  - MORPHOLOGY; Standing  - MORPHOLOGY  - IMMATURE PLT FRACTION; Standing  - IMMATURE PLT FRACTION  - Basic Metabolic Panel  - ESTIMATED GFR; Standing  - ESTIMATED GFR  - VANCOMYCIN TROUGH LEVEL;  Standing  - VANCOMYCIN PEAK; Standing  - CBC WITH DIFFERENTIAL  - DIFFERENTIAL MANUAL; Standing  - DIFFERENTIAL MANUAL  - PERIPHERAL SMEAR REVIEW; Standing  - PERIPHERAL SMEAR REVIEW  - PLATELET ESTIMATE; Standing  - PLATELET ESTIMATE  - MORPHOLOGY; Standing  - MORPHOLOGY  - IMMATURE PLT FRACTION; Standing  - IMMATURE PLT FRACTION  - Basic Metabolic Panel  - VANCOMYCIN PEAK  - VANCOMYCIN TROUGH LEVEL  - VITAMIN 1,25 DIHYDROXY (CALCIUM METABOLISM); Standing  - VITAMIN 1,25 DIHYDROXY (CALCIUM METABOLISM)  - WASHCLOTH PERINEAL CARE-COMFORT SHIELD; Standing  - WASHCLOTH PERINEAL CARE-COMFORT SHIELD  - ESTIMATED GFR; Standing  - ESTIMATED GFR  - REFERENCE MISC.AMBIENT; Standing  - REFERENCE MISC.AMBIENT  - CBC WITH DIFFERENTIAL  - DIFFERENTIAL MANUAL; Standing  - DIFFERENTIAL MANUAL  - PERIPHERAL SMEAR REVIEW; Standing  - PERIPHERAL SMEAR REVIEW  - PLATELET ESTIMATE; Standing  - PLATELET ESTIMATE  - MORPHOLOGY; Standing  - MORPHOLOGY  - IMMATURE PLT FRACTION; Standing  - IMMATURE PLT FRACTION  - COD - Adult (Type and Screen); Standing  - COD - Adult (Type and Screen)  - Basic Metabolic Panel  - ESTIMATED GFR; Standing  - ESTIMATED GFR  - Insert IV; Standing  - Obtain Patient Consent for Blood; Standing  - Vital signs per blood transfusion policy; Standing  - Insert IV  - Obtain Patient Consent for Blood  - RELEASE RED BLOOD CELLS  - PLATELETS REQUEST; Standing  - PLATELETS REQUEST  - Positioner Patient  [id number] Accerssory; Standing  - Positioner Patient  [id number] Accerssory  - CBC WITH DIFFERENTIAL  - DIFFERENTIAL MANUAL; Standing  - DIFFERENTIAL MANUAL  - PERIPHERAL SMEAR REVIEW; Standing  - PERIPHERAL SMEAR REVIEW  - PLATELET ESTIMATE; Standing  - PLATELET ESTIMATE  - MORPHOLOGY; Standing  - MORPHOLOGY  - IMMATURE PLT FRACTION; Standing  - IMMATURE PLT FRACTION  - Basic Metabolic Panel  - ESTIMATED GFR; Standing  - ESTIMATED GFR  - Positioner Patient  [id number] Accerssory; Standing  - Positioner Patient   [id number] Accerssory  - CBC WITH DIFFERENTIAL  - Comp Metabolic Panel; Standing  - DIFFERENTIAL MANUAL; Standing  - DIFFERENTIAL MANUAL  - PERIPHERAL SMEAR REVIEW; Standing  - PERIPHERAL SMEAR REVIEW  - PLATELET ESTIMATE; Standing  - PLATELET ESTIMATE  - MORPHOLOGY; Standing  - MORPHOLOGY  - IMMATURE PLT FRACTION; Standing  - IMMATURE PLT FRACTION  - Basic Metabolic Panel  - ESTIMATED GFR; Standing  - ESTIMATED GFR  - CBC WITH DIFFERENTIAL  - Comp Metabolic Panel  - ESTIMATED GFR; Standing  - ESTIMATED GFR  - Basic Metabolic Panel  - DIFFERENTIAL MANUAL; Standing  - DIFFERENTIAL MANUAL  - PERIPHERAL SMEAR REVIEW; Standing  - PERIPHERAL SMEAR REVIEW  - PLATELET ESTIMATE; Standing  - PLATELET ESTIMATE  - MORPHOLOGY; Standing  - MORPHOLOGY  - IMMATURE PLT FRACTION; Standing  - IMMATURE PLT FRACTION

## 2025-03-31 NOTE — DIETARY
"Nutrition Services: Initial Assessment     Day 9 of admit. Tomas Jean-Baptiste is 24 y.o., male with admitting DX of Acute lymphoblastic leukemia (ALL) in relapse ([facility]) [C91.02].    Consult Received for: Length of stay    Current Hospital Problems List:    Principal Problem:    Acute lymphoblastic leukemia (ALL) in relapse (HCC) (POA: Yes)  Active Problems:    At risk for opportunistic infections (POA: Yes)    Anemia associated with chemotherapy (POA: Yes)    Bacteremia (POA: Unknown)  Resolved Problems:    * No resolved hospital problems. *         Nutrition Assessment:      Height: 165.1 cm (5' 5\")  Weight: 72.3 kg (159 lb 6.3 oz)  Weight taken via: Stand Up Scale  BMI Calculated: 26.52   BMI Classification: Overweight       Weight Readings from Last 5 encounters:   Wt Readings from Last 15 Encounters:   03/22/25 72.3 kg (159 lb 6.3 oz)   03/21/25 72.4 kg (159 lb 9.8 oz)   03/16/25 72.5 kg (159 lb 13.3 oz)   03/14/25 76.7 kg (169 lb 1.5 oz)   03/09/25 77.9 kg (171 lb 11.8 oz)   03/06/25 75.7 kg (166 lb 14.2 oz)   02/27/25 79 kg (174 lb 2.6 oz)   01/15/25 81 kg (178 lb 9.2 oz)   10/15/24 78.8 kg (173 lb 11.6 oz)   09/10/24 75.8 kg (167 lb 1.7 oz)   08/08/24 76 kg (167 lb 8.8 oz)   07/08/24 75.9 kg (167 lb 5.3 oz)   05/20/24 69.3 kg (152 lb 12.5 oz)   05/20/24 68.9 kg (151 lb 14.4 oz)   04/22/24 69.4 kg (153 lb)       Objective:   Pertinent Medical Hx: 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 Noted pt currently with tenesmus and nausea.  Pertinent Labs: LFTs elevated: AST 55, , Alk phos 116  Pertinent Meds: Tums, zofran, prednisone, senokot, vitamin D3, NS@100ml/hr  Skin/Wounds:  No pressure injuries documented  Food Allergies: NKFA  Last BM:  Type 6: Fluffy pieces with ragged edges, a mushy stool  03/30/25       Current Diet Order/Intake:   Regular diet      Subjective:   Telehealth visit - spoke with pt on the phone. Pt reports appetite has been decreased since admission. States " the antibiotics are affecting his appetite noting that things taste dull and he's not feeling very hungry. Reports his girlfriend has been bringing in frozen yogurt smoothies and he's been eating cereal. Agreeable to get vanilla flavor Ensure plus high protein daily.     Estimates he has been losing weight r/t hospitalization but unsure how much. Reports -160 lbs.    Nutrition Focused Physical Exam (NFPE)  Weight Loss: Per chart review, pt with weight fluctuations over past several months. Suspect change may be due to edema and fluid shifts. Will continue to monitor.  Muscle Mass: Unable to identify at this time; RD working remotely  Subcutaneous Fat: Unable to identify at this time; RD working remotely  Fluid Accumulation: 1+ BLE edema  Reduced  Strength: N/A in acute care setting.    Nutrition Diagnosis:      Inadequate Oral Intake related to decreased appetite as evidenced by pt intakes <50% of meals.      Based on RD assessment at this time, Unable to fully assess for malnutrition at this time    Nutrition Interventions:      Continue regular diet as ordered.  Recommend Ensure Plus High Protein (provides 350 calories, 20 g protein per 8 fl oz) daily.  Patient aware of active plan of care as appropriate.       Nutrition Monitoring and Evaluation:      Monitor nutrition POC, goal for >50% intake from meals and supplements.  Additional fluids per MD/DO  Monitor vital signs pertinent to nutrition.    RD following and will provide updated recommendations as indicated.      Mely Dixon R.D.                                         ASPEN/AND CRITERIA FOR MALNUTRITION

## 2025-03-31 NOTE — PROGRESS NOTES
"Pharmacy Chemotherapy Calculations:    Dx: relapsed B-ALL, ph+     Cycle 1, Day 22  Previous treatment = Induction D15 on 3/21/25; original tx finished 5/20/24    Protocol: Individualized 3 Drug Reinduction + TKI  IT methotrexate 15 mg for age >/=9 yrs on Days 1,     - no need for D8 and 29 LP for re-induction, CNS neg per Dr Faye  Vincristine 1.5 mg/m2 (max 2 mg) IV over 5-10 mins on Days 1, 8, 15, and 22  Prednisone 30 mg/m2 PO BID on Days 1-28  Pegaspargase 2000 units/m2 IV over 1-2 hrs on Day 4     - Switched to dasatinib 70 mg BID due to imatinib resistance  Dosing references: [facility] [place] protocols [id number] and 1631 (imantinib)  Plan to transition to [place] consolidation after re-induction    Allergies:  Amoxicillin    /53   Pulse 78   Temp 36.8 °C (98.3 °F) (Oral)   Resp 16   Ht 1.651 m (5' 5\")   Wt 72.3 kg (159 lb 6.3 oz)   SpO2 98%   BMI 26.52 kg/m²  Body surface area is 1.82 meters squared.    Labs from 3/25/25 reviewed - all within treatment parameters (no bili done today)      Vincristine 1.5 mg/m2 (max 2 mg) x 1.82 m2 = 2.73 mg   Okay to treat with final max dose = 2 mg IV        uJdy Bryant, PharmD, BCOP    "

## 2025-03-31 NOTE — CARE PLAN
The patient is Watcher - Medium risk of patient condition declining or worsening    Shift Goals  Clinical Goals: Monitor blood counts, IV abx  Patient Goals: Pain/nausea control  Family Goals: None Present    Progress made toward(s) clinical / shift goals:        Problem: Knowledge Deficit - Standard  Goal: Patient and family/care givers will demonstrate understanding of plan of care, disease process/condition, diagnostic tests and medications  Outcome: Progressing  Note: Discussed POC with pt, monitoring blood counts, IV abx per MAR, pt understands and agrees.      Problem: Pain - Standard  Goal: Alleviation of pain or a reduction in pain to the patient’s comfort goal  Outcome: Progressing  Note: Pt reports 7/10 pleuritic chest pain, medicated per MAR.

## 2025-03-31 NOTE — DISCHARGE PLANNING
Case Management Discharge Planning    Admission Date: 3/22/2025  GMLOS: 2.9  ALOS: 9    6-Clicks ADL Score: 24  6-Clicks Mobility Score: 24      Anticipated Discharge Dispo: Discharge Disposition: Discharged to home/self care (01)    DME Needed: No    Action(s) Taken: Discussed in IDT rounds, pt's blood culture sensitives are pending at this time, blood cultures were sent out. Plan for possible inpatient chemotherapy. Pt to continue on IV ABX.     Escalations Completed: None    Medically Clear: No    Next Steps: Pending medical clearance.     Barriers to Discharge: Medical clearance    Is the patient up for discharge tomorrow: No

## 2025-04-01 LAB
ANION GAP SERPL CALC-SCNC: 9 MMOL/L (ref 7–16)
ANISOCYTOSIS BLD QL SMEAR: ABNORMAL
BASOPHILS # BLD AUTO: 0 % (ref 0–1.8)
BASOPHILS # BLD: 0 K/UL (ref 0–0.12)
BUN SERPL-MCNC: 7 MG/DL (ref 8–22)
CALCIUM SERPL-MCNC: 7 MG/DL (ref 8.5–10.5)
CHLORIDE SERPL-SCNC: 102 MMOL/L (ref 96–112)
CO2 SERPL-SCNC: 25 MMOL/L (ref 20–33)
COMMENT NL1176: NORMAL
CREAT SERPL-MCNC: 0.43 MG/DL (ref 0.5–1.4)
EOSINOPHIL # BLD AUTO: 0 K/UL (ref 0–0.51)
EOSINOPHIL NFR BLD: 0 % (ref 0–6.9)
ERYTHROCYTE [DISTWIDTH] IN BLOOD BY AUTOMATED COUNT: 44.4 FL (ref 35.9–50)
GFR SERPLBLD CREATININE-BSD FMLA CKD-EPI: 153 ML/MIN/1.73 M 2
GLUCOSE SERPL-MCNC: 147 MG/DL (ref 65–99)
HCT VFR BLD AUTO: 24 % (ref 42–52)
HGB BLD-MCNC: 8.4 G/DL (ref 14–18)
LYMPHOCYTES # BLD AUTO: 0.22 K/UL (ref 1–4.8)
LYMPHOCYTES NFR BLD: 36.3 % (ref 22–41)
MACROCYTES BLD QL SMEAR: ABNORMAL
MANUAL DIFF BLD: NORMAL
MCH RBC QN AUTO: 29.6 PG (ref 27–33)
MCHC RBC AUTO-ENTMCNC: 35 G/DL (ref 32.3–36.5)
MCV RBC AUTO: 84.5 FL (ref 81.4–97.8)
MICROCYTES BLD QL SMEAR: ABNORMAL
MONOCYTES # BLD AUTO: 0.05 K/UL (ref 0–0.85)
MONOCYTES NFR BLD AUTO: 8.9 % (ref 0–13.4)
MORPHOLOGY BLD-IMP: NORMAL
NEUTROPHILS # BLD AUTO: 0.33 K/UL (ref 1.82–7.42)
NEUTROPHILS NFR BLD: 54.8 % (ref 44–72)
NRBC # BLD AUTO: 0.02 K/UL
NRBC BLD-RTO: 3.6 /100 WBC (ref 0–0.2)
PLATELET # BLD AUTO: 58 K/UL (ref 164–446)
PLATELET BLD QL SMEAR: NORMAL
PLATELETS.RETICULATED NFR BLD AUTO: 8.9 % (ref 0.6–13.1)
PMV BLD AUTO: 12 FL (ref 9–12.9)
POIKILOCYTOSIS BLD QL SMEAR: NORMAL
POTASSIUM SERPL-SCNC: 3.8 MMOL/L (ref 3.6–5.5)
RBC # BLD AUTO: 2.84 M/UL (ref 4.7–6.1)
RBC BLD AUTO: PRESENT
SMUDGE CELLS BLD QL SMEAR: NORMAL
SODIUM SERPL-SCNC: 136 MMOL/L (ref 135–145)
STOMATOCYTES BLD QL SMEAR: NORMAL
TEST NAME 95000: NORMAL
VANCOMYCIN TROUGH SERPL-MCNC: 14.1 UG/ML (ref 10–20)
WBC # BLD AUTO: 0.6 K/UL (ref 4.8–10.8)

## 2025-04-01 PROCEDURE — 85027 COMPLETE CBC AUTOMATED: CPT

## 2025-04-01 PROCEDURE — 96158 HLTH BHV IVNTJ INDIV 1ST 30: CPT | Performed by: PSYCHOLOGIST

## 2025-04-01 PROCEDURE — 700102 HCHG RX REV CODE 250 W/ 637 OVERRIDE(OP): Performed by: PEDIATRICS

## 2025-04-01 PROCEDURE — 700111 HCHG RX REV CODE 636 W/ 250 OVERRIDE (IP): Performed by: PEDIATRICS

## 2025-04-01 PROCEDURE — 85055 RETICULATED PLATELET ASSAY: CPT

## 2025-04-01 PROCEDURE — 80202 ASSAY OF VANCOMYCIN: CPT

## 2025-04-01 PROCEDURE — 770004 HCHG ROOM/CARE - ONCOLOGY PRIVATE *

## 2025-04-01 PROCEDURE — 700111 HCHG RX REV CODE 636 W/ 250 OVERRIDE (IP): Mod: JZ | Performed by: PEDIATRICS

## 2025-04-01 PROCEDURE — 700105 HCHG RX REV CODE 258: Performed by: PEDIATRICS

## 2025-04-01 PROCEDURE — A9270 NON-COVERED ITEM OR SERVICE: HCPCS | Performed by: PEDIATRICS

## 2025-04-01 PROCEDURE — 99233 SBSQ HOSP IP/OBS HIGH 50: CPT | Performed by: PEDIATRICS

## 2025-04-01 PROCEDURE — 80048 BASIC METABOLIC PNL TOTAL CA: CPT

## 2025-04-01 PROCEDURE — 85007 BL SMEAR W/DIFF WBC COUNT: CPT

## 2025-04-01 PROCEDURE — 96159 HLTH BHV IVNTJ INDIV EA ADDL: CPT | Performed by: PSYCHOLOGIST

## 2025-04-01 RX ORDER — CALCIUM CARBONATE 500 MG/1
1500 TABLET, CHEWABLE ORAL
Status: DISCONTINUED | OUTPATIENT
Start: 2025-04-01 | End: 2025-04-09 | Stop reason: HOSPADM

## 2025-04-01 RX ADMIN — LORAZEPAM 1 MG: 2 INJECTION INTRAMUSCULAR; INTRAVENOUS at 13:20

## 2025-04-01 RX ADMIN — DASATINIB 70 MG: 70 TABLET ORAL at 17:01

## 2025-04-01 RX ADMIN — VANCOMYCIN HYDROCHLORIDE 1500 MG: 5 INJECTION, POWDER, LYOPHILIZED, FOR SOLUTION INTRAVENOUS at 22:59

## 2025-04-01 RX ADMIN — DASATINIB 70 MG: 70 TABLET ORAL at 04:24

## 2025-04-01 RX ADMIN — ANTACID TABLETS 1500 MG: 500 TABLET, CHEWABLE ORAL at 19:41

## 2025-04-01 RX ADMIN — ONDANSETRON 8 MG: 2 INJECTION INTRAMUSCULAR; INTRAVENOUS at 08:06

## 2025-04-01 RX ADMIN — ONDANSETRON 8 MG: 2 INJECTION INTRAMUSCULAR; INTRAVENOUS at 23:01

## 2025-04-01 RX ADMIN — SODIUM CHLORIDE: 9 INJECTION, SOLUTION INTRAVENOUS at 11:17

## 2025-04-01 RX ADMIN — ANTACID TABLETS 1500 MG: 500 TABLET, CHEWABLE ORAL at 13:21

## 2025-04-01 RX ADMIN — LORAZEPAM 1 MG: 2 INJECTION INTRAMUSCULAR; INTRAVENOUS at 04:27

## 2025-04-01 RX ADMIN — HYDROCORTISONE: 1 CREAM TOPICAL at 17:02

## 2025-04-01 RX ADMIN — ONDANSETRON 8 MG: 2 INJECTION INTRAMUSCULAR; INTRAVENOUS at 15:00

## 2025-04-01 RX ADMIN — CEFEPIME 2 G: 2 INJECTION, POWDER, FOR SOLUTION INTRAVENOUS at 04:23

## 2025-04-01 RX ADMIN — MORPHINE SULFATE 2 MG: 4 INJECTION, SOLUTION INTRAMUSCULAR; INTRAVENOUS at 20:39

## 2025-04-01 RX ADMIN — PREDNISONE 60 MG: 50 TABLET ORAL at 20:39

## 2025-04-01 RX ADMIN — CHLORHEXIDINE GLUCONATE, 0.12% ORAL RINSE 15 ML: 1.2 SOLUTION DENTAL at 08:04

## 2025-04-01 RX ADMIN — OXYCODONE HYDROCHLORIDE 10 MG: 5 TABLET ORAL at 19:40

## 2025-04-01 RX ADMIN — Medication 1000 UNITS: at 04:24

## 2025-04-01 RX ADMIN — CHLORHEXIDINE GLUCONATE, 0.12% ORAL RINSE 15 ML: 1.2 SOLUTION DENTAL at 13:21

## 2025-04-01 RX ADMIN — CEFEPIME 2 G: 2 INJECTION, POWDER, FOR SOLUTION INTRAVENOUS at 22:56

## 2025-04-01 RX ADMIN — SODIUM CHLORIDE: 9 INJECTION, SOLUTION INTRAVENOUS at 20:45

## 2025-04-01 RX ADMIN — PREDNISONE 60 MG: 50 TABLET ORAL at 08:04

## 2025-04-01 RX ADMIN — Medication 1 APPLICATOR: at 06:00

## 2025-04-01 RX ADMIN — Medication 1 APPLICATOR: at 17:00

## 2025-04-01 RX ADMIN — CEFEPIME 2 G: 2 INJECTION, POWDER, FOR SOLUTION INTRAVENOUS at 13:20

## 2025-04-01 RX ADMIN — CHLORHEXIDINE GLUCONATE, 0.12% ORAL RINSE 15 ML: 1.2 SOLUTION DENTAL at 20:38

## 2025-04-01 RX ADMIN — VANCOMYCIN HYDROCHLORIDE 1500 MG: 5 INJECTION, POWDER, LYOPHILIZED, FOR SOLUTION INTRAVENOUS at 02:12

## 2025-04-01 RX ADMIN — VANCOMYCIN HYDROCHLORIDE 1500 MG: 5 INJECTION, POWDER, LYOPHILIZED, FOR SOLUTION INTRAVENOUS at 08:04

## 2025-04-01 RX ADMIN — VANCOMYCIN HYDROCHLORIDE 1500 MG: 5 INJECTION, POWDER, LYOPHILIZED, FOR SOLUTION INTRAVENOUS at 13:59

## 2025-04-01 RX ADMIN — ANTACID TABLETS 1000 MG: 500 TABLET, CHEWABLE ORAL at 08:04

## 2025-04-01 RX ADMIN — CHLORHEXIDINE GLUCONATE, 0.12% ORAL RINSE 15 ML: 1.2 SOLUTION DENTAL at 17:01

## 2025-04-01 ASSESSMENT — PAIN DESCRIPTION - PAIN TYPE
TYPE: ACUTE PAIN

## 2025-04-01 NOTE — CARE PLAN
The patient is Watcher - Medium risk of patient condition declining or worsening    Shift Goals  Clinical Goals: Monitor blood counts, IV abx  Patient Goals: Pain/nausea control  Family Goals: None Present    Progress made toward(s) clinical / shift goals:    Problem: Pain - Standard  Goal: Alleviation of pain or a reduction in pain to the patient’s comfort goal  Description: Target End Date:  Prior to discharge or change in level of careDocument on Vitals flowsheet1.  Document pain using the appropriate pain scale per order or unit policy2.  Educate and implement non-pharmacologic comfort measures (i.e. relaxation, distraction, massage, cold/heat therapy, etc.)3.  Pain management medications as ordered4.  Reassess pain after pain med administration per policy5.  If opiods administered assess patient's response to pain medication is appropriate per POSS sedation scale6.  Follow pain management plan developed in collaboration with patient and interdisciplinary team (including palliative care or pain specialists if applicable)  Outcome: Progressing     Problem: Fall Risk  Goal: Patient will remain free from falls  Description: Target End Date:  Prior to discharge or change in level of careDocument interventions on the Cabrera Shaquille Fall Risk Assessment1.  Assess for fall risk factors2.  Implement fall precautions  Outcome: Progressing       Patient is not progressing towards the following goals:

## 2025-04-01 NOTE — PROGRESS NOTES
"Pediatric Hematology/Oncology  Inpatient Progress Note      Patient Name:  Tomas Jean-Baptiste  : 2000   MRN: [id number]    Location of Service: [facility]  Date of Service: 2025  Time: 8:04 AM    Primary Care Physician: Margarito Arvizu M.D.    HISTORY OF PRESENT ILLNESS:     Chief Complaint:relapsed Ph+ B ALL in Re-Induction with F&N     Subjective:   Feeling ok. He passed 2 stools and still has rectal pain with stooling, but it is much better.           Review of Systems:   Constitutional: Afebrile overnight. Energy and activity are low, but has been slowly improved the past few days.  HENT: Negative for ear pain, nasal congestion or rhinorrhea, nosebleeds and sore throat.  No mouth sores.  Eyes: Negative for visual changes.  Respiratory: Negative for shortness of breath or noisy breathing.   Cardiovascular: Negative for chest pain or extremity swelling.    Gastrointestinal: Negative for nausea, vomiting, abdominal pain, diarrhea. +tenesmus, but no constipation or blood in stool.    Genitourinary: Negative for painful urination, blood in urine or flank pain.    Musculoskeletal: Negative for joint or muscle pains.    Skin: Negative for rash, signs of infection.  Neurological: Negative for numbness, tingling, sensory changes, weakness or headaches.    Endo/Heme/Allergies: Does not bruise/bleed easily.    Psychiatric/Behavioral: No changes in mood, appropriate for age.       OBJECTIVE:     Vitals:   Ambulatory Vitals  Encounter Vitals  Temperature: 36.2 °C (97.2 °F)  Temp src: Oral  Blood Pressure: 120/59  BP Location: Right, Upper Arm  Patient BP Position: Flores's Position  Pulse: 70  Respiration: 16  Pulse Oximetry: 96 %  O2 (LPM): 0  O2 Delivery Device: None - Room Air  Weight: 72.3 kg (159 lb 6.3 oz)  Weight Source: Stand Up Scale  Height: 165.1 cm (5' 5\")  BMI (Calculated): 26.52  Location: Shoulder  Description: Aching    Labs:  Recent Labs     25  0225 25  0206 25  0200 "   HEMOGLOBIN 8.5* 8.7* 8.4*   HEMATOCRIT 24.5* 24.8* 24.0*   MCV 84.8 84.1 84.5   MCH 29.4 29.5 29.6   MACROCYTOSIS  --   --  1+   ANISOCYTOSIS  --   --  1+   PLATELETCT 43* 45* 58*     Recent Labs     03/29/25  0936 03/30/25  0900 03/31/25  0206   SODIUM 137 136 134*   POTASSIUM 3.6 3.8 3.7   CHLORIDE 105 104 102   CO2 22 23 24   GLUCOSE 178* 124* 123*   BUN 9 7* 8     Recent Labs     03/29/25  0936 03/30/25  0900 03/31/25  0206   ALBUMIN  --   --  2.1*   TBILIRUBIN  --   --  0.7   ALKPHOSPHAT  --   --  116*   TOTPROTEIN  --   --  3.6*   ALTSGPT  --   --  146*   ASTSGOT  --   --  55*   CREATININE 0.40* 0.33* 0.38*          Physical Exam:  Constitutional: NT in NAD.   HENT: Normocephalic and atraumatic. No nasal congestion or rhinorrhea. Oropharynx is clear and moist. No oral ulcerations or sores.    Eyes: Conjunctivae are normal. Pupils are equal, round, and reactive to light.    Neck: Normal range of motion of neck, no adenopathy.    Cardiovascular: Normal rate, regular rhythm and normal heart sounds.  I/VI sys flow murmur best heard @LSB. DP/radial pulses 2+, cap refill < 2 sec  Pulmonary/Chest: Effort normal and breath sounds normal. No respiratory distress. Symmetric expansion.  No crackles or wheezes.  Abdomen: Soft. Bowel sounds are normal. No distension and no mass. There is no hepatosplenomegaly.    Genitourinary:  Deferred  Musculoskeletal: Normal range of motion of lower and upper extremities bilaterally. No tenderness to palpation of elbows, wrists, hands, knees, ankles and feet bilaterally.   Lymphadenopathy: No cervical adenopathy, axillary adenopathy or inguinal adenopathy.   Neurological: Alert and oriented to person and place. Exhibits normal muscle tone bilaterally in upper and lower extremities. Gait normal. Coordination normal.  C/o neuropathy from prior therapy..   Skin: Skin is warm, dry and pink.  No rash or evidence of skin infection.    Psychiatric: Mood and affect normal for  age.  Karnofsky: 80      ASSESSMENT AND PLAN:   Tomas Jean-Baptiste is a 24 y.o. 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               - Hemodynamically stable and afebrile, will continue to monitor closely              - Vanco trough OK 3/27. Recheck. Monitor creatinine.              - Continue abx until he has more reliable blood counts, specifically his ANC.     2) Constipation/Rectal Pain/Pain with Defecation/Hematochezia:   - Pain was resolved for a day. No longer as severe, but still recurs when passing stool.   - Morphine 2 mg IV every 3 hrs PRN for moderate-severe pain not controlled by oxycodone.   - Oxycodone 5-10 mg PO every 4 hrs PRN for moderate-severe pain  - Senna 2 tablets BID, titrate to effect  - Preparation H, apply externally   - Sitz bath PRN  - MR rectum not available as an inpatient. MRI pelvis demonstrated mild diffuse increase signal throughout the pelvic muscles possibly related to inflammation. No obvious sinus tract or fluid collection in the anus or ischial anal fossa.  - Will continue to monitor clinically. Seems likely due to ongoing neutropenia and likely has some level of mucositis.      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 Likely plan for HSCT, search for MUD ongoing. After discussing multiple options, they decided on a 3-Drug Re-Induction (VCR/PRED/PEG-ASP) +Imatinib followed by blinatumomab. 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 Met virtually with Dr. Adrian, [place] BMT 3/20/2025  - For CINV: Zofran 8 mg IV PRN, Ativan 1 mg IV PRN       3) Individualized Salvage Therapy, 3-Drug Re-Induction, Day 25 on 01APR2025:  ** Methotrexate 15 mg IT x 1 on Days 1 (COMPLETE, see separate procedure note)  ** Vincristine 2 mg IV x 1 on Days 1 (COMPLETE), 8 (COMPLETE), 15 (COMPLETE) and 22 (COMPLETE)  ** Prednisone 30 mg/m2/dose = 60 mg PO BID x 28 days   ** PEG-Aspargase 2000 IU/m2 x 1 on Day 4 in [facility]IC (COMPLETE)   ** Imatinib 400 mg PO QAM and 250 mg PO QHS (started 3/6/2025, given resistence, discontinued)  ** Dasatinib 70 mg PO BID, started 3/22/2025  ** On Pepcid BID while on corticosteroids. However, pharmacist called  to report that pepcid  has been known to decrease the efficacy of Dasatinib. Hence, switched to Tums and to be  by 2 hrs around Dasatinib administration.   **Anticipate Day 29 procedures on Friday, 04APR025      4) Pancytopenia Secondary to leukemia and therapy:  - Follow CBC w/diff daily. ANC hopefully is rebounding and at 330. Plt rising on his own.  - Transfuse irradiated PRBC for Hgb<7 g/dL or symptomatic. Monitor his energy level as it associates with his hgb as this is his first cycle of re-induction chemo.    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 No clinical lesions  - Consider levofloxacin PO as outpatient if has prolonged neutropenia     6) FEN/GI:  - Regular diet  - IVF at 100 ml/hr  - CMP on Mon/Thurs, BMP other days     7) Hyperglycemia, Steroid-Induced:  - Moderately elevated blood sugars. 124, 123 the prior 2 am's. F/U BMP.  - No medicinal intervention at this time. Can check intermittent Udip.      8) Transaminitis Secondary To Therapy:   - AST: 55 U/L, ALT: 146 U/L 3/31/2025 stable if not improved.   - Bilirubin total 0.7 mg/dL 3/31/2025  - Will continue to monitor  - Monday-Thursday hepatic labs     9) Central Access:  - Double-lumen CVL placed 2/28/2025 by surgery  - Saline flush per protocol     10) Psychosocial:  - Dr. Ortega following     11) Social:              - Referred to Social Work and financial navigator     Disposition: Remain inpatient for treatment of bacteremia and F&N.        Wenceslao Hopper M.D.  Pediatric Hematology / Oncology  [facility]  Office. [phone]      1. Acute lymphoblastic leukemia (ALL) in relapse (HCC)  - MD Alert...Vancomycin per Pharmacy  - MD Alert...Vancomycin per Pharmacy  - MD  Alert...Vancomycin per Pharmacy    2. Bacteremia  - MD Alert...Vancomycin per Pharmacy  - MD Alert...Vancomycin per Pharmacy  - MD Alert...Vancomycin per Pharmacy    3. Nausea  - scopolamine (Transderm-Scop) patch 1 Patch  - ondansetron (Zofran) syringe/vial injection 8 mg    4. Abdominal pain, unspecified abdominal location    5. Central line complication, initial encounter  - alteplase (Cathflo) syringe *Central Line Only* 2 mg    6. Hypovitaminosis D  - vitamin D3 (Cholecalciferol) tablet 1,000 Units    7. 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 Hypocalcemia  - calcium carbonate (Tums) chewable tab 1,000 mg    Other orders  - Admission Order: Patient will be admitted to the appropriate Med/Surg, CDU, Obstetrics or Pediatric Unit (Excludes ICU, IMC or Telemetry); Standing  - Full code; Standing  - Vital signs per nursing policy; Standing  - Notify Primary Care Physician of patient arrival and document name of Physician and office contacted in progress notes; Standing  - RN to place diet per age order based on patient needs/assessment; Standing  - Activity/Weight-bearing/Mobility Order- No Restrictions; Activity as Tolerated; Standing  - Height and Weight; Standing  - Head circumference measurement upon Admisssion; Standing  - Intake And Output; Standing  - lidocaine-prilocaine (Emla) 2.5-2.5 % cream  - NS infusion  - Admission Order: Patient will be admitted to the appropriate Med/Surg, CDU, Obstetrics or Pediatric Unit (Excludes ICU, IMC or Telemetry)  - Full code  - Vital signs per nursing policy  - Notify Primary Care Physician of patient arrival and document name of Physician and office contacted in progress notes  - RN to place diet per age order based on patient needs/assessment  - Activity/Weight-bearing/Mobility Order- No Restrictions; Activity as Tolerated  - Height and Weight  - Head  circumference measurement upon Admisssion  - Intake And Output  - Respiratory Oximetry (PEDS/NICU) Spot Check  - cefepime (Maxipime) 2 g in  mL IVPB  - acetaminophen (Tylenol) tablet 650 mg  - COD - Adult (Type and Screen); Standing  - COD - Adult (Type and Screen)  - Blood Culture; Standing  - Blood Culture; Standing  - Comp Metabolic Panel; Standing  - MAGNESIUM; Standing  - PHOSPHORUS; Standing  - Blood Culture  - Blood Culture  - Comp Metabolic Panel  - MAGNESIUM  - PHOSPHORUS  - morphine 4 MG/ML injection 2 mg  - oxyCODONE immediate-release (Roxicodone) tablet 5-10 mg; Refill: 0  - senna-docusate (Pericolace Or Senokot S) 8.6-50 MG per tablet 2 Tablet  - MR-PELVIS-WITH & W/O AND SEQUENCES; Standing  - MR-PELVIS-WITH & W/O AND SEQUENCES  - dasatinib (Sprycel) TABS 70 mg  - predniSONE (Deltasone) tablet 60 mg  - sulfamethoxazole-trimethoprim (Bactrim DS) 800-160 MG tablet 1 Tablet  - chlorhexidine (Peridex) 0.12 % solution 15 mL  - morphine 4 MG/ML injection 2 mg  - Diet Order Diet: Regular; Standing  - Diet Order Diet: Regular  - K Pad Motor; Standing  - K-Pad 14X20; Standing  - K Pad Motor  - K-Pad 14X20  - ESTIMATED GFR; Standing  - ESTIMATED GFR  - hydrocortisone 1 % cream  - Sitz Bath; Standing  - LORazepam (Ativan) injection 1 mg  - Consent for Blood Transfusion; Standing  - Consent for Iodinated Contrast Administration; Standing  - Consent for Blood Transfusion  - Consent for Iodinated Contrast Administration  - CBC WITH DIFFERENTIAL; Standing  - CBC WITH DIFFERENTIAL  - IP Consult to Case Management; Standing  - IP Consult to Case Management  - Nozin nasal  swab  - DIFFERENTIAL MANUAL; Standing  - DIFFERENTIAL MANUAL  - PERIPHERAL SMEAR REVIEW; Standing  - PERIPHERAL SMEAR REVIEW  - PLATELET ESTIMATE; Standing  - PLATELET ESTIMATE  - MORPHOLOGY; Standing  - MORPHOLOGY  - IMMATURE PLT FRACTION; Standing  - IMMATURE PLT FRACTION  - Verify consent has been obtained; Standing  - Vital signs per  blood transfusion policy; Standing  - PEDS RELEASE RED BLOOD CELLS (UNITS); Standing  - PEDS RELEASE PLATELET PHERESIS (UNITS); Standing  - Verify consent has been obtained  - CBC WITH DIFFERENTIAL; Standing  - Basic Metabolic Panel; Standing  - PLATELETS REQUEST; Standing  - PLATELETS REQUEST  - PEDS RELEASE PLATELET PHERESIS (UNITS)  - PEDS RELEASE RED BLOOD CELLS (UNITS)  - CBC WITH DIFFERENTIAL  - Basic Metabolic Panel  - MRSA By PCR (Amp); Standing  - MRSA By PCR (Amp)  - vancomycin (Vancocin) 1,750 mg in  mL IVPB  - Positioner Patient SM [id number] Accerssory; Standing  - Positioner Patient SM [id number] Accerssory  - ESTIMATED GFR; Standing  - ESTIMATED GFR  - DIFFERENTIAL MANUAL; Standing  - DIFFERENTIAL MANUAL  - PERIPHERAL SMEAR REVIEW; Standing  - PERIPHERAL SMEAR REVIEW  - PLATELET ESTIMATE; Standing  - PLATELET ESTIMATE  - MORPHOLOGY; Standing  - MORPHOLOGY  - IMMATURE PLT FRACTION; Standing  - IMMATURE PLT FRACTION  - CBC WITH DIFFERENTIAL  - DIFFERENTIAL MANUAL; Standing  - DIFFERENTIAL MANUAL  - PERIPHERAL SMEAR REVIEW; Standing  - PERIPHERAL SMEAR REVIEW  - PLATELET ESTIMATE; Standing  - PLATELET ESTIMATE  - MORPHOLOGY; Standing  - MORPHOLOGY  - IMMATURE PLT FRACTION; Standing  - IMMATURE PLT FRACTION  - PEDS RELEASE PLATELET PHERESIS (UNITS); Standing  - PLATELETS REQUEST; Standing  - PLATELETS REQUEST  - VANCOMYCIN PEAK; Standing  - VANCOMYCIN TROUGH; Standing  - VANCOMYCIN PEAK  - PEDS RELEASE PLATELET PHERESIS (UNITS)  - VANCOMYCIN TROUGH  - Comp Metabolic Panel; Standing  - CBC WITH DIFFERENTIAL  - Comp Metabolic Panel  - ESTIMATED GFR; Standing  - ESTIMATED GFR  - DIFFERENTIAL MANUAL; Standing  - DIFFERENTIAL MANUAL  - PERIPHERAL SMEAR REVIEW; Standing  - PERIPHERAL SMEAR REVIEW  - PLATELET ESTIMATE; Standing  - PLATELET ESTIMATE  - MORPHOLOGY; Standing  - MORPHOLOGY  - IMMATURE PLT FRACTION; Standing  - IMMATURE PLT FRACTION  - Basic Metabolic Panel  - RELEASE RED BLOOD CELLS; Standing  -  BLOOD CULTURE; Standing  - BLOOD CULTURE; Standing  - BLOOD CULTURE  - BLOOD CULTURE  - VITAMIN D,25 HYDROXY (DEFICIENCY); Standing  - VITAMIN D,25 HYDROXY (DEFICIENCY)  - RELEASE RED BLOOD CELLS  - ESTIMATED GFR; Standing  - ESTIMATED GFR  - VANCOMYCIN TROUGH; Standing  - VANCOMYCIN TROUGH  - gadoteridol (Prohance) injection 15 mL  - vancomycin (Vancocin) 1,500 mg in  mL IVPB  - CBC WITH DIFFERENTIAL  - DIFFERENTIAL MANUAL; Standing  - DIFFERENTIAL MANUAL  - PERIPHERAL SMEAR REVIEW; Standing  - PERIPHERAL SMEAR REVIEW  - PLATELET ESTIMATE; Standing  - PLATELET ESTIMATE  - MORPHOLOGY; Standing  - MORPHOLOGY  - IMMATURE PLT FRACTION; Standing  - IMMATURE PLT FRACTION  - Basic Metabolic Panel  - ESTIMATED GFR; Standing  - ESTIMATED GFR  - VANCOMYCIN TROUGH LEVEL; Standing  - VANCOMYCIN PEAK; Standing  - CBC WITH DIFFERENTIAL  - DIFFERENTIAL MANUAL; Standing  - DIFFERENTIAL MANUAL  - PERIPHERAL SMEAR REVIEW; Standing  - PERIPHERAL SMEAR REVIEW  - PLATELET ESTIMATE; Standing  - PLATELET ESTIMATE  - MORPHOLOGY; Standing  - MORPHOLOGY  - IMMATURE PLT FRACTION; Standing  - IMMATURE PLT FRACTION  - Basic Metabolic Panel  - VANCOMYCIN PEAK  - VANCOMYCIN TROUGH LEVEL  - VITAMIN 1,25 DIHYDROXY (CALCIUM METABOLISM); Standing  - VITAMIN 1,25 DIHYDROXY (CALCIUM METABOLISM)  - WASHCLOTH PERINEAL CARE-COMFORT SHIELD; Standing  - WASHCLOTH PERINEAL CARE-COMFORT SHIELD  - ESTIMATED GFR; Standing  - ESTIMATED GFR  - REFERENCE MISC.AMBIENT; Standing  - REFERENCE MISC.AMBIENT  - CBC WITH DIFFERENTIAL  - DIFFERENTIAL MANUAL; Standing  - DIFFERENTIAL MANUAL  - PERIPHERAL SMEAR REVIEW; Standing  - PERIPHERAL SMEAR REVIEW  - PLATELET ESTIMATE; Standing  - PLATELET ESTIMATE  - MORPHOLOGY; Standing  - MORPHOLOGY  - IMMATURE PLT FRACTION; Standing  - IMMATURE PLT FRACTION  - COD - Adult (Type and Screen); Standing  - COD - Adult (Type and Screen)  - Basic Metabolic Panel  - ESTIMATED GFR; Standing  - ESTIMATED GFR  - Insert IV;  Standing  - Obtain Patient Consent for Blood; Standing  - Vital signs per blood transfusion policy; Standing  - Insert IV  - Obtain Patient Consent for Blood  - RELEASE RED BLOOD CELLS  - PLATELETS REQUEST; Standing  - PLATELETS REQUEST  - Positioner Patient SM [id number] Accerssory; Standing  - Positioner Patient SM [id number] Accerssory  - CBC WITH DIFFERENTIAL  - DIFFERENTIAL MANUAL; Standing  - DIFFERENTIAL MANUAL  - PERIPHERAL SMEAR REVIEW; Standing  - PERIPHERAL SMEAR REVIEW  - PLATELET ESTIMATE; Standing  - PLATELET ESTIMATE  - MORPHOLOGY; Standing  - MORPHOLOGY  - IMMATURE PLT FRACTION; Standing  - IMMATURE PLT FRACTION  - Basic Metabolic Panel  - ESTIMATED GFR; Standing  - ESTIMATED GFR  - Positioner Patient SM [id number] Accerssory; Standing  - Positioner Patient SM [id number] Accerssory  - CBC WITH DIFFERENTIAL  - Comp Metabolic Panel; Standing  - DIFFERENTIAL MANUAL; Standing  - DIFFERENTIAL MANUAL  - PERIPHERAL SMEAR REVIEW; Standing  - PERIPHERAL SMEAR REVIEW  - PLATELET ESTIMATE; Standing  - PLATELET ESTIMATE  - MORPHOLOGY; Standing  - MORPHOLOGY  - IMMATURE PLT FRACTION; Standing  - IMMATURE PLT FRACTION  - Basic Metabolic Panel  - ESTIMATED GFR; Standing  - ESTIMATED GFR  - CBC WITH DIFFERENTIAL  - Comp Metabolic Panel  - ESTIMATED GFR; Standing  - ESTIMATED GFR  - DIFFERENTIAL MANUAL; Standing  - DIFFERENTIAL MANUAL  - PERIPHERAL SMEAR REVIEW; Standing  - PERIPHERAL SMEAR REVIEW  - PLATELET ESTIMATE; Standing  - PLATELET ESTIMATE  - MORPHOLOGY; Standing  - MORPHOLOGY  - IMMATURE PLT FRACTION; Standing  - IMMATURE PLT FRACTION  - Supplements; Standing  - Supplements  - CBC WITH DIFFERENTIAL  - DIFFERENTIAL MANUAL; Standing  - DIFFERENTIAL MANUAL  - PERIPHERAL SMEAR REVIEW; Standing  - PERIPHERAL SMEAR REVIEW  - PLATELET ESTIMATE; Standing  - PLATELET ESTIMATE  - MORPHOLOGY; Standing  - MORPHOLOGY  - IMMATURE PLT FRACTION; Standing  - IMMATURE PLT FRACTION  - Basic Metabolic Panel

## 2025-04-01 NOTE — PROGRESS NOTES
Pharmacy Vancomycin Kinetics Note for 4/1/2025     24 y.o. male on Vancomycin day # 10     Vancomycin Indication (Trough based Dosing): Non S. aureus bacteremia (goal trough 10-15)    Provider specified end date: 04/06/25    Active Antibiotics (From admission, onward)      Ordered     Ordering Provider       brooks Apr 1, 2025  2:47 PM    04/01/25 1447  vancomycin (Vancocin) 1,500 mg in  mL IVPB  EVERY 8 HOURS        Note to Pharmacy: Per P&T Kinetics Protocol    Wenceslao Hopper M.D.       Thu Mar 27, 2025  3:49 PM    03/27/25 1549  MD Alert...Vancomycin per Pharmacy  PHARMACY TO DOSE        Question:  Indication(s) for vancomycin?  Answer:  Other (comments)  Comment:  rothia bacteremia    Wenceslao Hopper M.D.       Sat Mar 22, 2025  4:57 PM    03/22/25 1657  sulfamethoxazole-trimethoprim (Bactrim DS) 800-160 MG tablet 1 Tablet  2 times per day on Sunday Saturday         Pepe Faye M.D.       Sat Mar 22, 2025  3:36 PM    03/22/25 1536  cefepime (Maxipime) 2 g in  mL IVPB  EVERY 8 HOURS        Note to Pharmacy: First Dose STAT please    Alyce Duenas M.D.            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 Cefepime and Flagyl initiated as pt neutropenic. Pt on maintenance dasatinib, last chemo received 3/6/25. Vancomycin for bacteremia. Sens (send out) pending. Provider wants to cont vanco until sens avail.    Allergies:     Amoxicillin     Pertinent cultures to date:     Results       Procedure Component Value Units Date/Time    BLOOD CULTURE [[id number]] Collected: 03/25/25 1744    Order Status: Completed Specimen: Blood from Line Updated: 03/30/25 1900     Significant Indicator NEG     Source BLD     Site LINE     Culture Result No growth after 5 days of incubation.    BLOOD CULTURE [[id number]] Collected: 03/25/25 1744    Order  "Status: Completed Specimen: Blood from Line Updated: 25 1900     Significant Indicator NEG     Source BLD     Site Line     Culture Result No growth after 5 days of incubation.    Blood Culture [[id number]]  (Abnormal) Collected: 25 1713    Order Status: Completed Specimen: Blood from Line Updated: 25 0949     Significant Indicator POS     Source BLD     Site LINE     Culture Result Growth detected by automated blood culture system. 08:22  Gram Stain: Gram positive cocci in clusters.        Rothia mucilaginosa  Sent to [facility] for susceptibilities.      Blood Culture [[id number]]  (Abnormal) Collected: 25 1713    Order Status: Completed Specimen: Blood from Line Updated: 25 1036     Significant Indicator POS     Source BLD     Site LINE     Culture Result Growth detected by automated blood culture system. 08:23      Rothia mucilaginosa    MRSA By PCR (Amp) [[id number]] Collected: 25 0932    Order Status: Completed Specimen: Respirate from Nares Updated: 25 1657     MRSA by PCR Negative    Blood Culture [[id number]]     Order Status: Canceled Specimen: Blood from Line     Blood Culture [[id number]]     Order Status: Canceled Specimen: Blood from Peripheral             Labs:     Estimated Creatinine Clearance: 232.4 mL/min (A) (by C-G formula based on SCr of 0.43 mg/dL (L)).  Recent Labs     25  0225 25  0206 25  0200   WBC 0.6* 0.8* 0.6*   NEUTSPOLYS 69.80 25.60* 54.80     Recent Labs     25  0900 25  0206 25  0815   BUN 7* 8 7*   CREATININE 0.33* 0.38* 0.43*   ALBUMIN  --  2.1*  --        Intake/Output Summary (Last 24 hours) at 2025 1450  Last data filed at 2025 0819  Gross per 24 hour   Intake --   Output 1700 ml   Net -1700 ml      /59   Pulse 82   Temp 36.4 °C (97.6 °F) (Oral)   Resp 16   Ht 1.651 m (5' 5\")   Wt 72.3 kg (159 lb 6.3 oz)   SpO2 97%  Temp (24hrs), Av.7 °C (98.1 °F), Min:36.2 °C (97.2 °F), " Max:37.1 °C (98.7 °F)      List concerns for Vancomycin clearance: abnormal LFTs    Pharmacokinetics:     Trough kinetics:   Recent Labs     04/01/25  1320   VANCOTROUGH 14.1   - Calculated AUC (from trough): 633    A/P:     -  Vancomycin dose: 1500mg IV q8h (starting at 2200 4/1/25)    -  Next vancomycin level(s):    ~ 7 days or sooner if indicated    -  Comments: Renal function appears stable. AUC above therapeutic goal. Decrease frequency as noted above. Pharmacy will monitor/adjust as needed per protocol.     Galen Wilson, PharmD

## 2025-04-01 NOTE — CARE PLAN
The patient is Watcher - Medium risk of patient condition declining or worsening    Shift Goals  Clinical Goals: Monitor blood counts  Patient Goals: Nausea control  Family Goals: None Present    Progress made toward(s) clinical / shift goals:        Problem: Knowledge Deficit - Standard  Goal: Patient and family/care givers will demonstrate understanding of plan of care, disease process/condition, diagnostic tests and medications  Outcome: Progressing  Note: Discussed POC with pt, continuing to monitor daily blood counts, neutropenic precautions in place.      Problem: Fall Risk  Goal: Patient will remain free from falls  Outcome: Progressing  Note: Pt is A&Ox4, up self with steady gait, uses bedside urinal appropriately, calls if assistance is needed.

## 2025-04-02 LAB
ANION GAP SERPL CALC-SCNC: 9 MMOL/L (ref 7–16)
ANISOCYTOSIS BLD QL SMEAR: ABNORMAL
BACTERIA BLD CULT: ABNORMAL
BACTERIA BLD CULT: ABNORMAL
BASOPHILS # BLD AUTO: 0 % (ref 0–1.8)
BASOPHILS # BLD: 0 K/UL (ref 0–0.12)
BUN SERPL-MCNC: 6 MG/DL (ref 8–22)
CALCIUM SERPL-MCNC: 7.5 MG/DL (ref 8.5–10.5)
CHLORIDE SERPL-SCNC: 100 MMOL/L (ref 96–112)
CO2 SERPL-SCNC: 25 MMOL/L (ref 20–33)
COMMENT NL1176: NORMAL
CREAT SERPL-MCNC: 0.43 MG/DL (ref 0.5–1.4)
EOSINOPHIL # BLD AUTO: 0 K/UL (ref 0–0.51)
EOSINOPHIL NFR BLD: 0 % (ref 0–6.9)
ERYTHROCYTE [DISTWIDTH] IN BLOOD BY AUTOMATED COUNT: 44.5 FL (ref 35.9–50)
GFR SERPLBLD CREATININE-BSD FMLA CKD-EPI: 153 ML/MIN/1.73 M 2
GLUCOSE SERPL-MCNC: 183 MG/DL (ref 65–99)
HCT VFR BLD AUTO: 23.1 % (ref 42–52)
HGB BLD-MCNC: 7.9 G/DL (ref 14–18)
LYMPHOCYTES # BLD AUTO: 0.02 K/UL (ref 1–4.8)
LYMPHOCYTES NFR BLD: 1.7 % (ref 22–41)
MANUAL DIFF BLD: NORMAL
MCH RBC QN AUTO: 28.8 PG (ref 27–33)
MCHC RBC AUTO-ENTMCNC: 34.2 G/DL (ref 32.3–36.5)
MCV RBC AUTO: 84.3 FL (ref 81.4–97.8)
MICROCYTES BLD QL SMEAR: ABNORMAL
MONOCYTES # BLD AUTO: 0.08 K/UL (ref 0–0.85)
MONOCYTES NFR BLD AUTO: 8.5 % (ref 0–13.4)
MORPHOLOGY BLD-IMP: NORMAL
NEUTROPHILS # BLD AUTO: 0.81 K/UL (ref 1.82–7.42)
NEUTROPHILS NFR BLD: 89.8 % (ref 44–72)
NRBC # BLD AUTO: 0.02 K/UL
NRBC BLD-RTO: 2.2 /100 WBC (ref 0–0.2)
PLATELET # BLD AUTO: 85 K/UL (ref 164–446)
PLATELET BLD QL SMEAR: NORMAL
PLATELETS.RETICULATED NFR BLD AUTO: 8.3 % (ref 0.6–13.1)
PMV BLD AUTO: 11.2 FL (ref 9–12.9)
POIKILOCYTOSIS BLD QL SMEAR: NORMAL
POTASSIUM SERPL-SCNC: 3.9 MMOL/L (ref 3.6–5.5)
RBC # BLD AUTO: 2.74 M/UL (ref 4.7–6.1)
RBC BLD AUTO: PRESENT
SIGNIFICANT IND 70042: ABNORMAL
SITE SITE: ABNORMAL
SODIUM SERPL-SCNC: 134 MMOL/L (ref 135–145)
SOURCE SOURCE: ABNORMAL
STOMATOCYTES BLD QL SMEAR: NORMAL
WBC # BLD AUTO: 0.9 K/UL (ref 4.8–10.8)

## 2025-04-02 PROCEDURE — 99233 SBSQ HOSP IP/OBS HIGH 50: CPT | Performed by: PEDIATRICS

## 2025-04-02 PROCEDURE — 700111 HCHG RX REV CODE 636 W/ 250 OVERRIDE (IP): Performed by: PEDIATRICS

## 2025-04-02 PROCEDURE — 85027 COMPLETE CBC AUTOMATED: CPT

## 2025-04-02 PROCEDURE — 700102 HCHG RX REV CODE 250 W/ 637 OVERRIDE(OP): Performed by: PEDIATRICS

## 2025-04-02 PROCEDURE — 85007 BL SMEAR W/DIFF WBC COUNT: CPT

## 2025-04-02 PROCEDURE — A9270 NON-COVERED ITEM OR SERVICE: HCPCS | Performed by: PEDIATRICS

## 2025-04-02 PROCEDURE — 770004 HCHG ROOM/CARE - ONCOLOGY PRIVATE *

## 2025-04-02 PROCEDURE — 80048 BASIC METABOLIC PNL TOTAL CA: CPT

## 2025-04-02 PROCEDURE — 700111 HCHG RX REV CODE 636 W/ 250 OVERRIDE (IP): Mod: JZ | Performed by: PEDIATRICS

## 2025-04-02 PROCEDURE — 85055 RETICULATED PLATELET ASSAY: CPT

## 2025-04-02 PROCEDURE — 700105 HCHG RX REV CODE 258: Performed by: PEDIATRICS

## 2025-04-02 RX ADMIN — CHLORHEXIDINE GLUCONATE, 0.12% ORAL RINSE 15 ML: 1.2 SOLUTION DENTAL at 18:09

## 2025-04-02 RX ADMIN — SCOPOLAMINE 1 PATCH: 1.5 PATCH, EXTENDED RELEASE TRANSDERMAL at 10:26

## 2025-04-02 RX ADMIN — VANCOMYCIN HYDROCHLORIDE 1500 MG: 5 INJECTION, POWDER, LYOPHILIZED, FOR SOLUTION INTRAVENOUS at 13:42

## 2025-04-02 RX ADMIN — HYDROCORTISONE: 1 CREAM TOPICAL at 05:44

## 2025-04-02 RX ADMIN — ONDANSETRON 8 MG: 2 INJECTION INTRAMUSCULAR; INTRAVENOUS at 10:22

## 2025-04-02 RX ADMIN — CHLORHEXIDINE GLUCONATE, 0.12% ORAL RINSE 15 ML: 1.2 SOLUTION DENTAL at 13:42

## 2025-04-02 RX ADMIN — Medication 1000 UNITS: at 05:29

## 2025-04-02 RX ADMIN — VANCOMYCIN HYDROCHLORIDE 1500 MG: 5 INJECTION, POWDER, LYOPHILIZED, FOR SOLUTION INTRAVENOUS at 05:28

## 2025-04-02 RX ADMIN — CEFEPIME 2 G: 2 INJECTION, POWDER, FOR SOLUTION INTRAVENOUS at 21:57

## 2025-04-02 RX ADMIN — CEFEPIME 2 G: 2 INJECTION, POWDER, FOR SOLUTION INTRAVENOUS at 05:28

## 2025-04-02 RX ADMIN — DASATINIB 70 MG: 70 TABLET ORAL at 18:00

## 2025-04-02 RX ADMIN — Medication 1 APPLICATOR: at 18:09

## 2025-04-02 RX ADMIN — ANTACID TABLETS 1500 MG: 500 TABLET, CHEWABLE ORAL at 10:29

## 2025-04-02 RX ADMIN — CHLORHEXIDINE GLUCONATE, 0.12% ORAL RINSE 15 ML: 1.2 SOLUTION DENTAL at 22:08

## 2025-04-02 RX ADMIN — CHLORHEXIDINE GLUCONATE, 0.12% ORAL RINSE 15 ML: 1.2 SOLUTION DENTAL at 10:29

## 2025-04-02 RX ADMIN — Medication 1 APPLICATOR: at 05:33

## 2025-04-02 RX ADMIN — VANCOMYCIN HYDROCHLORIDE 1500 MG: 5 INJECTION, POWDER, LYOPHILIZED, FOR SOLUTION INTRAVENOUS at 22:00

## 2025-04-02 RX ADMIN — SODIUM CHLORIDE: 9 INJECTION, SOLUTION INTRAVENOUS at 19:24

## 2025-04-02 RX ADMIN — PREDNISONE 60 MG: 50 TABLET ORAL at 10:29

## 2025-04-02 RX ADMIN — PREDNISONE 60 MG: 50 TABLET ORAL at 21:58

## 2025-04-02 RX ADMIN — LORAZEPAM 1 MG: 2 INJECTION INTRAMUSCULAR; INTRAVENOUS at 18:10

## 2025-04-02 RX ADMIN — ANTACID TABLETS 1500 MG: 500 TABLET, CHEWABLE ORAL at 18:09

## 2025-04-02 RX ADMIN — DASATINIB 70 MG: 70 TABLET ORAL at 05:30

## 2025-04-02 RX ADMIN — OXYCODONE HYDROCHLORIDE 10 MG: 5 TABLET ORAL at 16:51

## 2025-04-02 RX ADMIN — HYDROCORTISONE: 1 CREAM TOPICAL at 18:00

## 2025-04-02 RX ADMIN — CEFEPIME 2 G: 2 INJECTION, POWDER, FOR SOLUTION INTRAVENOUS at 13:41

## 2025-04-02 RX ADMIN — ONDANSETRON 8 MG: 2 INJECTION INTRAMUSCULAR; INTRAVENOUS at 18:10

## 2025-04-02 RX ADMIN — SENNOSIDES AND DOCUSATE SODIUM 2 TABLET: 50; 8.6 TABLET ORAL at 05:29

## 2025-04-02 ASSESSMENT — PAIN DESCRIPTION - PAIN TYPE
TYPE: ACUTE PAIN
TYPE: ACUTE PAIN

## 2025-04-02 NOTE — CARE PLAN
The patient is Watcher - Medium risk of patient condition declining or worsening    Shift Goals  Clinical Goals: Nausea control  Patient Goals: Pain/Nausea control  Family Goals: N/A    Progress made toward(s) clinical / shift goals: Nausea and pain well controlled a this moment     Problem: Pain - Standard  Goal: Alleviation of pain or a reduction in pain to the patient’s comfort goal  Outcome: Progressing  Note: Reported pain using 0 - 10 pain scale, Pain management medications administered as ordered      Problem: Fall Risk  Goal: Patient will remain free from falls  Outcome: Progressing  Note: Patient calls appropriately for toileting, patient wearing nonslip socks, patient belongings and call light within reach       Patient is not progressing towards the following goals:

## 2025-04-02 NOTE — DISCHARGE PLANNING
Case Management Discharge Planning    Admission Date: 3/22/2025  GMLOS: 2.5  ALOS: 11    6-Clicks ADL Score: 24  6-Clicks Mobility Score: 24      Anticipated Discharge Dispo: Discharge Disposition: Discharged to home/self care (01)    DME Needed: No    Action(s) Taken: RNCM completed chart review, patient will continue antibiotics until he has more reliable blood counts. Per Oncology note, if clinically doing well, consider discharge on 4/5 vs next cycle of anti-malignant therapy.     Escalations Completed: None    Medically Clear: No    Next Steps: pending medical clearance     Barriers to Discharge: Medical clearance    Is the patient up for discharge tomorrow: No

## 2025-04-02 NOTE — PROGRESS NOTES
"Pediatric Hematology/Oncology  Inpatient Progress Note      Patient Name:  Tomas Jean-Baptiste  : 2000   MRN: [id number]    Location of Service: [facility]    Date of Service: 2025  Time: 8:16 AM    Subjective:  Feels OK. Sleeping much during the day. Tenesmus is better.      Review of Systems:   Constitutional: Afebrile overnight. Energy and activity are low, but has been slowly improved the past few days.  HENT: Negative for ear pain, nasal congestion or rhinorrhea, nosebleeds and sore throat.  No mouth sores.  Eyes: Negative for visual changes.  Respiratory: Negative for shortness of breath or noisy breathing.   Cardiovascular: Negative for chest pain or extremity swelling.    Gastrointestinal: Negative for nausea, vomiting, abdominal pain, diarrhea. +tenesmus that is improving. No constipation or blood in stool.    Genitourinary: Negative for painful urination, blood in urine or flank pain.    Musculoskeletal: Negative for joint or muscle pains.    Skin: Negative for rash, signs of infection.  Neurological: Negative for numbness, tingling, sensory changes, weakness or headaches.    Endo/Heme/Allergies: Does not bruise/bleed easily.    Psychiatric/Behavioral: No changes in mood, appropriate for age.     OBJECTIVE:     Vitals:   Ambulatory Vitals  Encounter Vitals  Temperature: 36.4 °C (97.6 °F)  Temp src: Oral  Blood Pressure: 114/66  BP Location: Right, Upper Arm  Patient BP Position: Flores's Position  Pulse: 83  Respiration: 16  Pulse Oximetry: 97 %  O2 (LPM): 0  O2 Delivery Device: None - Room Air  Weight: 72.3 kg (159 lb 6.3 oz)  Weight Source: Stand Up Scale  Height: 165.1 cm (5' 5\")  BMI (Calculated): 26.52  Location: Rectum  Description: Aching    Labs:  Recent Labs     25  0206 25  0200 25  0201   HEMOGLOBIN 8.7* 8.4* 7.9*   HEMATOCRIT 24.8* 24.0* 23.1*   MCV 84.1 84.5 84.3   MCH 29.5 29.6 28.8   MACROCYTOSIS  --  1+  --  "   ANISOCYTOSIS  --  1+ 1+   PLATELETCT 45* 58* 85*     Recent Labs     03/30/25  0900 03/31/25  0206 04/01/25  0815   SODIUM 136 134* 136   POTASSIUM 3.8 3.7 3.8   CHLORIDE 104 102 102   CO2 23 24 25   GLUCOSE 124* 123* 147*   BUN 7* 8 7*     Recent Labs     03/30/25  0900 03/31/25  0206 04/01/25  0815   ALBUMIN  --  2.1*  --    TBILIRUBIN  --  0.7  --    ALKPHOSPHAT  --  116*  --    TOTPROTEIN  --  3.6*  --    ALTSGPT  --  146*  --    ASTSGOT  --  55*  --    CREATININE 0.33* 0.38* 0.43*           Physical Exam:  Constitutional: NT in NAD.   HENT: Normocephalic and atraumatic. No nasal congestion or rhinorrhea. Oropharynx is clear and moist. No oral ulcerations or sores.    Eyes: Conjunctivae are normal. Pupils are equal, round, and reactive to light.    Neck: Normal range of motion of neck, no adenopathy.    Cardiovascular: Normal rate, regular rhythm and normal heart sounds.  I/VI sys flow murmur best heard @LSB. DP/radial pulses 2+, cap refill < 2 sec  Pulmonary/Chest: Effort normal and breath sounds normal. No respiratory distress. Symmetric expansion.  No crackles or wheezes.  Abdomen: Soft. Bowel sounds are normal. No distension and no mass. There is no hepatosplenomegaly.    Genitourinary:  Deferred  Musculoskeletal: Normal range of motion of lower and upper extremities bilaterally. No tenderness to palpation of elbows, wrists, hands, knees, ankles and feet bilaterally.   Lymphadenopathy: No cervical adenopathy, axillary adenopathy or inguinal adenopathy.   Neurological: Alert and oriented to person and place. Exhibits normal muscle tone bilaterally in upper and lower extremities. Gait normal. Coordination normal. C/o neuropathy from prior anti-leukemic therapy..   Skin: Skin is warm, dry and pink.  No rash or evidence of skin infection.    Psychiatric: Mood and affect normal for age. 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 It seems susceptibilities were not processed for this microbe.               - Hemodynamically stable and afebrile, will continue to monitor closely              - Vanco trough OK 3/27. 4/1/25 14.1 and changed to q8h dosing. F/U CMP              - Continue abx until he has more reliable blood counts, specifically his ANC. If clinically doing well, consider discharge 4/5/25 vs start next cycle of anti-malignant therapy.      2) Constipation/Rectal Pain/Pain with Defecation/Hematochezia:   - Pain was resolved this past Saturday, then recurred. No longer as severe as upon admission, but still recurs when passing stool.   - Morphine 2 mg IV every 3 hrs PRN for moderate-severe pain not controlled by oxycodone.   - Oxycodone 5-10 mg PO every 4 hrs PRN for moderate-severe pain  - Senna 2 tablets BID, titrate to effect  - Preparation H, apply externally   - Sitz bath PRN  - MR rectum not available as an inpatient. MRI pelvis demonstrated mild diffuse increase signal throughout the pelvic muscles possibly related to inflammation. No obvious sinus tract or fluid collection in the anus or ischial anal fossa.  - Will continue to monitor clinically. Seems likely due to ongoing neutropenia and likely has/had some level of mucositis.      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 Likely plan for HSCT, search for MUD ongoing. After discussing multiple options, they decided on a 3-Drug Re-Induction (VCR/PRED/PEG-ASP) +Imatinib followed by blinatumomab. 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 Met virtually with Dr. Adrian, [place] BMT 3/20/2025  - For CINV: Zofran 8 mg IV PRN, Ativan 1 mg IV PRN       3) Individualized Salvage Therapy, 3-Drug Re-Induction, Day 25 on 01APR2025:  ** Methotrexate 15 mg IT x 1 on Days 1 (COMPLETE, see separate procedure note)  ** Vincristine 2 mg IV x 1 on Days 1 (COMPLETE), 8 (COMPLETE), 15 (COMPLETE) and 22 (COMPLETE)  ** Prednisone 30 mg/m2/dose = 60 mg PO BID x 28 days   ** PEG-Aspargase 2000 IU/m2 x 1 on Day 4 in OPIC (COMPLETE)   ** Imatinib 400 mg PO QAM and 250 mg PO QHS (started 3/6/2025, given resistence, discontinued)  ** Dasatinib 70 mg PO BID, started 3/22/2025  ** On Pepcid BID while on  corticosteroids. Pharmacist discussed with primary oncologist decrease in efficacy of Dasatinib. Switched to Tums and to be  by at least 2 hrs from Dasatinib administration.   **Anticipate Day 29 procedures this Friday, 04APR025. NPO from tomorrow night.      4) Pancytopenia Secondary to leukemia and therapy:  - Follow CBC w/diff daily. ANC is recovering. . Plt rising on his own and now 85. Hgb 7.9.   - Transfuse irradiated PRBC for Hgb<7 g/dL or symptomatic.   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 No clinical lesions  - Consider levofloxacin PO as outpatient if has prolonged neutropenia     6) FEN/GI:  - Regular diet  - IVF at 100 ml/hr  - CMP on Mon/Thurs, BMP other days     7) Hyperglycemia, Steroid-Induced:  - Mild elevated blood glucose. F/U BMP.  - No medicinal intervention at this time. Can check intermittent Udip.      8) Transaminitis Secondary To Therapy:   - AST: 55 U/L, ALT: 146 U/L 3/31/2025 stable if not improved.   - Bilirubin total 0.7 mg/dL 3/31/2025  - Will continue to monitor  - Monday-Thursday hepatic labs  - F/U labs today     9) Central Access:  - Double-lumen CVL placed 2/28/2025 by surgery  - Saline flush per protocol     10) Psychosocial:  - Dr. Ortega following     11) Social:              - Referred to Social Work and financial navigator     Disposition: Remain inpatient for treatment of bacteremia and F&N.       Wenceslao Hopper M.D.  Pediatric Hematology / Oncology  [facility]  Office. [phone]    1. Acute lymphoblastic leukemia (ALL) in relapse (HCC)  - MD Alert...Vancomycin per Pharmacy  - MD Alert...Vancomycin per Pharmacy  - MD Alert...Vancomycin per Pharmacy    2. Bacteremia  -  MD Alert...Vancomycin per Pharmacy  - MD Alert...Vancomycin per Pharmacy  - MD Alert...Vancomycin per Pharmacy    3. Nausea  - scopolamine (Transderm-Scop) patch 1 Patch  - ondansetron (Zofran) syringe/vial injection 8 mg    4. Abdominal pain, unspecified abdominal location    5. Central line complication, initial encounter  - alteplase (Cathflo) syringe *Central Line Only* 2 mg    6. Hypovitaminosis D  - vitamin D3 (Cholecalciferol) tablet 1,000 Units    7. 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 Hypocalcemia  - calcium carbonate (Tums) chewable tab 1,500 mg    Other orders  - Admission Order: Patient will be admitted to the appropriate Med/Surg, CDU, Obstetrics or Pediatric Unit (Excludes ICU, IMC or Telemetry); Standing  - Full code; Standing  - Vital signs per nursing policy; Standing  - Notify Primary Care Physician of patient arrival and document name of Physician and office contacted in progress notes; Standing  - RN to place diet per age order based on patient needs/assessment; Standing  - Activity/Weight-bearing/Mobility Order- No Restrictions; Activity as Tolerated; Standing  - Height and Weight; Standing  - Head circumference measurement upon Admisssion; Standing  - Intake And Output; Standing  - lidocaine-prilocaine (Emla) 2.5-2.5 % cream  - NS infusion  - Admission Order: Patient will be admitted to the appropriate Med/Surg, CDU, Obstetrics or Pediatric Unit (Excludes ICU, IMC or Telemetry)  - Full code  - Vital signs per nursing policy  - Notify Primary Care Physician of patient arrival and document name of Physician and office contacted in progress notes  - RN to place diet per age order based on patient needs/assessment  - Activity/Weight-bearing/Mobility Order- No Restrictions; Activity as Tolerated  - Height and Weight  - Head circumference measurement upon Admisssion  - Intake  And Output  - Respiratory Oximetry (PEDS/NICU) Spot Check  - cefepime (Maxipime) 2 g in  mL IVPB  - acetaminophen (Tylenol) tablet 650 mg  - COD - Adult (Type and Screen); Standing  - COD - Adult (Type and Screen)  - Blood Culture; Standing  - Blood Culture; Standing  - Comp Metabolic Panel; Standing  - MAGNESIUM; Standing  - PHOSPHORUS; Standing  - Blood Culture  - Blood Culture  - Comp Metabolic Panel  - MAGNESIUM  - PHOSPHORUS  - morphine 4 MG/ML injection 2 mg  - oxyCODONE immediate-release (Roxicodone) tablet 5-10 mg; Refill: 0  - senna-docusate (Pericolace Or Senokot S) 8.6-50 MG per tablet 2 Tablet  - MR-PELVIS-WITH & W/O AND SEQUENCES; Standing  - MR-PELVIS-WITH & W/O AND SEQUENCES  - dasatinib (Sprycel) TABS 70 mg  - predniSONE (Deltasone) tablet 60 mg  - sulfamethoxazole-trimethoprim (Bactrim DS) 800-160 MG tablet 1 Tablet  - chlorhexidine (Peridex) 0.12 % solution 15 mL  - morphine 4 MG/ML injection 2 mg  - Diet Order Diet: Regular; Standing  - Diet Order Diet: Regular  - K Pad Motor; Standing  - K-Pad 14X20; Standing  - K Pad Motor  - K-Pad 14X20  - ESTIMATED GFR; Standing  - ESTIMATED GFR  - hydrocortisone 1 % cream  - Sitz Bath; Standing  - LORazepam (Ativan) injection 1 mg  - Consent for Blood Transfusion; Standing  - Consent for Iodinated Contrast Administration; Standing  - Consent for Blood Transfusion  - Consent for Iodinated Contrast Administration  - CBC WITH DIFFERENTIAL; Standing  - CBC WITH DIFFERENTIAL  - IP Consult to Case Management; Standing  - IP Consult to Case Management  - Nozin nasal  swab  - DIFFERENTIAL MANUAL; Standing  - DIFFERENTIAL MANUAL  - PERIPHERAL SMEAR REVIEW; Standing  - PERIPHERAL SMEAR REVIEW  - PLATELET ESTIMATE; Standing  - PLATELET ESTIMATE  - MORPHOLOGY; Standing  - MORPHOLOGY  - IMMATURE PLT FRACTION; Standing  - IMMATURE PLT FRACTION  - Verify consent has been obtained; Standing  - Vital signs per blood transfusion policy; Standing  - PEDS RELEASE  RED BLOOD CELLS (UNITS); Standing  - PEDS RELEASE PLATELET PHERESIS (UNITS); Standing  - Verify consent has been obtained  - CBC WITH DIFFERENTIAL; Standing  - Basic Metabolic Panel; Standing  - PLATELETS REQUEST; Standing  - PLATELETS REQUEST  - PEDS RELEASE PLATELET PHERESIS (UNITS)  - PEDS RELEASE RED BLOOD CELLS (UNITS)  - CBC WITH DIFFERENTIAL  - Basic Metabolic Panel  - MRSA By PCR (Amp); Standing  - MRSA By PCR (Amp)  - vancomycin (Vancocin) 1,750 mg in  mL IVPB  - Positioner Patient SM [id number] Accerssory; Standing  - Positioner Patient SM [id number] Accerssory  - ESTIMATED GFR; Standing  - ESTIMATED GFR  - DIFFERENTIAL MANUAL; Standing  - DIFFERENTIAL MANUAL  - PERIPHERAL SMEAR REVIEW; Standing  - PERIPHERAL SMEAR REVIEW  - PLATELET ESTIMATE; Standing  - PLATELET ESTIMATE  - MORPHOLOGY; Standing  - MORPHOLOGY  - IMMATURE PLT FRACTION; Standing  - IMMATURE PLT FRACTION  - CBC WITH DIFFERENTIAL  - DIFFERENTIAL MANUAL; Standing  - DIFFERENTIAL MANUAL  - PERIPHERAL SMEAR REVIEW; Standing  - PERIPHERAL SMEAR REVIEW  - PLATELET ESTIMATE; Standing  - PLATELET ESTIMATE  - MORPHOLOGY; Standing  - MORPHOLOGY  - IMMATURE PLT FRACTION; Standing  - IMMATURE PLT FRACTION  - PEDS RELEASE PLATELET PHERESIS (UNITS); Standing  - PLATELETS REQUEST; Standing  - PLATELETS REQUEST  - VANCOMYCIN PEAK; Standing  - VANCOMYCIN TROUGH; Standing  - VANCOMYCIN PEAK  - PEDS RELEASE PLATELET PHERESIS (UNITS)  - VANCOMYCIN TROUGH  - Comp Metabolic Panel; Standing  - CBC WITH DIFFERENTIAL  - Comp Metabolic Panel  - ESTIMATED GFR; Standing  - ESTIMATED GFR  - DIFFERENTIAL MANUAL; Standing  - DIFFERENTIAL MANUAL  - PERIPHERAL SMEAR REVIEW; Standing  - PERIPHERAL SMEAR REVIEW  - PLATELET ESTIMATE; Standing  - PLATELET ESTIMATE  - MORPHOLOGY; Standing  - MORPHOLOGY  - IMMATURE PLT FRACTION; Standing  - IMMATURE PLT FRACTION  - Basic Metabolic Panel  - RELEASE RED BLOOD CELLS; Standing  - BLOOD CULTURE; Standing  - BLOOD CULTURE;  Standing  - BLOOD CULTURE  - BLOOD CULTURE  - VITAMIN D,25 HYDROXY (DEFICIENCY); Standing  - VITAMIN D,25 HYDROXY (DEFICIENCY)  - RELEASE RED BLOOD CELLS  - ESTIMATED GFR; Standing  - ESTIMATED GFR  - VANCOMYCIN TROUGH; Standing  - VANCOMYCIN TROUGH  - gadoteridol (Prohance) injection 15 mL  - CBC WITH DIFFERENTIAL  - DIFFERENTIAL MANUAL; Standing  - DIFFERENTIAL MANUAL  - PERIPHERAL SMEAR REVIEW; Standing  - PERIPHERAL SMEAR REVIEW  - PLATELET ESTIMATE; Standing  - PLATELET ESTIMATE  - MORPHOLOGY; Standing  - MORPHOLOGY  - IMMATURE PLT FRACTION; Standing  - IMMATURE PLT FRACTION  - Basic Metabolic Panel  - ESTIMATED GFR; Standing  - ESTIMATED GFR  - VANCOMYCIN TROUGH LEVEL; Standing  - VANCOMYCIN PEAK; Standing  - CBC WITH DIFFERENTIAL  - DIFFERENTIAL MANUAL; Standing  - DIFFERENTIAL MANUAL  - PERIPHERAL SMEAR REVIEW; Standing  - PERIPHERAL SMEAR REVIEW  - PLATELET ESTIMATE; Standing  - PLATELET ESTIMATE  - MORPHOLOGY; Standing  - MORPHOLOGY  - IMMATURE PLT FRACTION; Standing  - IMMATURE PLT FRACTION  - Basic Metabolic Panel  - VANCOMYCIN PEAK  - VANCOMYCIN TROUGH LEVEL  - VITAMIN 1,25 DIHYDROXY (CALCIUM METABOLISM); Standing  - VITAMIN 1,25 DIHYDROXY (CALCIUM METABOLISM)  - WASHCLOTH PERINEAL CARE-COMFORT SHIELD; Standing  - WASHCLOTH PERINEAL CARE-COMFORT SHIELD  - ESTIMATED GFR; Standing  - ESTIMATED GFR  - REFERENCE MISC.AMBIENT; Standing  - REFERENCE MISC.AMBIENT  - CBC WITH DIFFERENTIAL  - DIFFERENTIAL MANUAL; Standing  - DIFFERENTIAL MANUAL  - PERIPHERAL SMEAR REVIEW; Standing  - PERIPHERAL SMEAR REVIEW  - PLATELET ESTIMATE; Standing  - PLATELET ESTIMATE  - MORPHOLOGY; Standing  - MORPHOLOGY  - IMMATURE PLT FRACTION; Standing  - IMMATURE PLT FRACTION  - COD - Adult (Type and Screen); Standing  - COD - Adult (Type and Screen)  - Basic Metabolic Panel  - ESTIMATED GFR; Standing  - ESTIMATED GFR  - Insert IV; Standing  - Obtain Patient Consent for Blood; Standing  - Vital signs per blood transfusion policy;  Standing  - Insert IV  - Obtain Patient Consent for Blood  - RELEASE RED BLOOD CELLS  - PLATELETS REQUEST; Standing  - PLATELETS REQUEST  - Positioner Patient SM [id number] Accerssory; Standing  - Positioner Patient SM [id number] Accerssory  - CBC WITH DIFFERENTIAL  - DIFFERENTIAL MANUAL; Standing  - DIFFERENTIAL MANUAL  - PERIPHERAL SMEAR REVIEW; Standing  - PERIPHERAL SMEAR REVIEW  - PLATELET ESTIMATE; Standing  - PLATELET ESTIMATE  - MORPHOLOGY; Standing  - MORPHOLOGY  - IMMATURE PLT FRACTION; Standing  - IMMATURE PLT FRACTION  - Basic Metabolic Panel  - ESTIMATED GFR; Standing  - ESTIMATED GFR  - Positioner Patient SM [id number] Accerssory; Standing  - Positioner Patient SM [id number] Accerssory  - CBC WITH DIFFERENTIAL  - Comp Metabolic Panel; Standing  - DIFFERENTIAL MANUAL; Standing  - DIFFERENTIAL MANUAL  - PERIPHERAL SMEAR REVIEW; Standing  - PERIPHERAL SMEAR REVIEW  - PLATELET ESTIMATE; Standing  - PLATELET ESTIMATE  - MORPHOLOGY; Standing  - MORPHOLOGY  - IMMATURE PLT FRACTION; Standing  - IMMATURE PLT FRACTION  - Basic Metabolic Panel  - ESTIMATED GFR; Standing  - ESTIMATED GFR  - CBC WITH DIFFERENTIAL  - Comp Metabolic Panel  - ESTIMATED GFR; Standing  - ESTIMATED GFR  - DIFFERENTIAL MANUAL; Standing  - DIFFERENTIAL MANUAL  - PERIPHERAL SMEAR REVIEW; Standing  - PERIPHERAL SMEAR REVIEW  - PLATELET ESTIMATE; Standing  - PLATELET ESTIMATE  - MORPHOLOGY; Standing  - MORPHOLOGY  - IMMATURE PLT FRACTION; Standing  - IMMATURE PLT FRACTION  - Supplements; Standing  - Supplements  - CBC WITH DIFFERENTIAL  - DIFFERENTIAL MANUAL; Standing  - DIFFERENTIAL MANUAL  - PERIPHERAL SMEAR REVIEW; Standing  - PERIPHERAL SMEAR REVIEW  - PLATELET ESTIMATE; Standing  - PLATELET ESTIMATE  - MORPHOLOGY; Standing  - MORPHOLOGY  - IMMATURE PLT FRACTION; Standing  - IMMATURE PLT FRACTION  - Basic Metabolic Panel  - VANCOMYCIN TROUGH; Standing  - ESTIMATED GFR; Standing  - ESTIMATED GFR  - VANCOMYCIN TROUGH  - Positioner Patient   [id number] Accerssory; Standing  - Positioner Patient SM [id number] Accerssory  - vancomycin (Vancocin) 1,500 mg in  mL IVPB  - CBC WITH DIFFERENTIAL  - DIFFERENTIAL MANUAL; Standing  - DIFFERENTIAL MANUAL  - PERIPHERAL SMEAR REVIEW; Standing  - PERIPHERAL SMEAR REVIEW  - PLATELET ESTIMATE; Standing  - PLATELET ESTIMATE  - MORPHOLOGY; Standing  - MORPHOLOGY  - IMMATURE PLT FRACTION; Standing  - IMMATURE PLT FRACTION  - Basic Metabolic Panel

## 2025-04-02 NOTE — PROGRESS NOTES
PEDIATRIC BEHAVIORAL HEALTH VISIT    ADULT REQUEST FOR CONFIDENTIALITY    Name:  Tomas Jean-Baptiste  MRN:  [id number]  :  2000  Age:  24 y.o.  Referring Provider: Dr. Faye (Hem/Onc)  Pediatrician:  Margarito Arvizu M.D.  Date of Service:  2025    Persons in Attendance: Tomas Roy     Chief Complaint/ Reason for Appointment: JULY, a 23 y/o male now in treatment for relapsed [place] Chromosome Precursor B-Cell Acute Lymphoblastic Leukemia was previously referred to counseling to assist in decreasing anxiety he has been experiencing and processing ways for how he can find himself now and what life will look like. See intake note dated 23. With his current relapse, psychology is meeting with JULY to process and cope with how it is impacting his life.     Mental Status Exam:   General description cooperative with interview  Interactional style Culturally appropriate  Eye contact Appropriate  Speech Unimpaired, fluid and clear, normal rate and rythem  Motor activity Average  Orientation Oriented to person time, place and situation  Intellectual functioning Unimpaired  Memory Unimpaired  Attention and concentration Intact and normative concentration  Fund of knowledge Average  Mood Tearful  Affect Appropriate to a bit Flat  Perceptual Disturbances None apparent  Thought Process  No abnormalities apparent       Associations Unimpaired associations       Abstractions Normal abstractions, intact       Insight Insight - adequate and normative       Judgment Judgments - intact and normative   Thought Content  No apparent delusions    Risk Assessment:  Tomas Roy did not report current concerns regarding risk to self or others.       Issues Discussed:   This provider met with JULY to continue processing the grief he is experiencing around his relapse as well as the estrangement of his mother and siblings. Reviewed his actions and ways to acknowledge his family's actions and choices. JULY also  spent time processing his fear of dying. Encouraged him to focus on what he has control over and who can support him in his journey.      Techniques and Interventions Used: Psycho-education and Cognitive Behavioral Therapy (CBT)      Treatment Recommendations and Plan:  JULY, a 25 y/o male currently in treatment for relapsed [place] Chromosome Precursor B-Cell Acute Lymphoblastic Leukemia was previously referred to counseling to assist in decreasing anxiety he had been experiencing and processing ways to find himself now and what life will look like. After meeting with JULY during his intake, it appears he could benefit from learning anxiety management skills, processing what he has been through, and how to live the life he wants. In treatment, Tomas Worship Nabeel Kimarosa benefited from learning appropriate ways of expressing and coping with his emotions, learning Cognitive Behavioral Therapy (CBT) and Acceptance and Commitment Therapy (ACT) tools to understand the interaction of thoughts, feelings, and actions, as well as Trauma Focused-CBT to process his cancer journey. Currently, he could benefit from processing how relapse is impacting his life and relationships and how the past is influencing him.       PLAN  JULY will learn and utilize appropriate ways to express and cope with emotions.    He will learn the connection between thoughts, feelings, and actions utilizing CBT and ACT tools.,   JULY will process how their medical diagnosis is impacting their life.    This provider will continue to meet with JULY.     The above diagnostic impressions, recommendations, and treatment plan were discussed with and agreed upon by Tomas Roy, and his caregivers. Care will be coordinated with Tomas Roy's healthcare team, as appropriate.    Total time spent on encounter was 60 minutes.    Laurie Ortega, PhD  Pediatric Psychologist   Licensed Psychologist, [place] # [id number]  [facility],  Behavioral Health

## 2025-04-02 NOTE — CARE PLAN
The patient is Watcher - Medium risk of patient condition declining or worsening    Shift Goals  Clinical Goals: Patient will verbalize tolerable pain level throughout shift, decreased nausea and monitor blood counts  Patient Goals: Pain/nausea control, rest  Family Goals: N/A    Progress made toward(s) clinical / shift goals:    Problem: Knowledge Deficit - Standard  Goal: Patient and family/care givers will demonstrate understanding of plan of care, disease process/condition, diagnostic tests and medications  Outcome: Progressing  Note: Pt is AO4. Call light within reach. Educated and understand POC. All questions answered at this time.     Problem: Pain - Standard  Goal: Alleviation of pain or a reduction in pain to the patient’s comfort goal  Outcome: Progressing  Note: Patient was complaining of pain after BM. Medication administered according to MAR.       Patient is not progressing towards the following goals:      Problem: Psychosocial  Goal: Patient's level of anxiety will decrease  Outcome: Not Progressing  Goal: Patient and family will demonstrate ability to cope with life altering diagnosis and/or procedure  Outcome: Not Progressing

## 2025-04-03 ENCOUNTER — ANESTHESIA EVENT (OUTPATIENT)
Dept: SURGERY | Facility: MEDICAL CENTER | Age: 25
DRG: 393 | End: 2025-04-03
Payer: COMMERCIAL

## 2025-04-03 LAB
ALBUMIN SERPL BCP-MCNC: 2.2 G/DL (ref 3.2–4.9)
ALBUMIN/GLOB SERPL: 1.4 G/DL
ALP SERPL-CCNC: 142 U/L (ref 30–99)
ALT SERPL-CCNC: 180 U/L (ref 2–50)
ANION GAP SERPL CALC-SCNC: 11 MMOL/L (ref 7–16)
ANION GAP SERPL CALC-SCNC: 11 MMOL/L (ref 7–16)
AST SERPL-CCNC: 56 U/L (ref 12–45)
BASOPHILS # BLD AUTO: 0 % (ref 0–1.8)
BASOPHILS # BLD: 0 K/UL (ref 0–0.12)
BILIRUB SERPL-MCNC: 0.5 MG/DL (ref 0.1–1.5)
BUN SERPL-MCNC: 7 MG/DL (ref 8–22)
BUN SERPL-MCNC: 8 MG/DL (ref 8–22)
CALCIUM ALBUM COR SERPL-MCNC: 9 MG/DL (ref 8.5–10.5)
CALCIUM SERPL-MCNC: 7.6 MG/DL (ref 8.5–10.5)
CALCIUM SERPL-MCNC: 7.6 MG/DL (ref 8.5–10.5)
CHLORIDE SERPL-SCNC: 101 MMOL/L (ref 96–112)
CHLORIDE SERPL-SCNC: 99 MMOL/L (ref 96–112)
CO2 SERPL-SCNC: 24 MMOL/L (ref 20–33)
CO2 SERPL-SCNC: 24 MMOL/L (ref 20–33)
CREAT SERPL-MCNC: 0.42 MG/DL (ref 0.5–1.4)
CREAT SERPL-MCNC: 0.53 MG/DL (ref 0.5–1.4)
EOSINOPHIL # BLD AUTO: 0 K/UL (ref 0–0.51)
EOSINOPHIL NFR BLD: 0 % (ref 0–6.9)
ERYTHROCYTE [DISTWIDTH] IN BLOOD BY AUTOMATED COUNT: 44.3 FL (ref 35.9–50)
GFR SERPLBLD CREATININE-BSD FMLA CKD-EPI: 144 ML/MIN/1.73 M 2
GFR SERPLBLD CREATININE-BSD FMLA CKD-EPI: 154 ML/MIN/1.73 M 2
GLOBULIN SER CALC-MCNC: 1.6 G/DL (ref 1.9–3.5)
GLUCOSE SERPL-MCNC: 240 MG/DL (ref 65–99)
GLUCOSE SERPL-MCNC: 253 MG/DL (ref 65–99)
HCT VFR BLD AUTO: 23 % (ref 42–52)
HGB BLD-MCNC: 7.9 G/DL (ref 14–18)
LYMPHOCYTES # BLD AUTO: 0.11 K/UL (ref 1–4.8)
LYMPHOCYTES NFR BLD: 5.3 % (ref 22–41)
MANUAL DIFF BLD: NORMAL
MCH RBC QN AUTO: 29.3 PG (ref 27–33)
MCHC RBC AUTO-ENTMCNC: 34.3 G/DL (ref 32.3–36.5)
MCV RBC AUTO: 85.2 FL (ref 81.4–97.8)
METAMYELOCYTES NFR BLD MANUAL: 0.9 %
MONOCYTES # BLD AUTO: 0.02 K/UL (ref 0–0.85)
MONOCYTES NFR BLD AUTO: 0.9 % (ref 0–13.4)
MORPHOLOGY BLD-IMP: NORMAL
MYELOCYTES NFR BLD MANUAL: 1.8 %
NEUTROPHILS # BLD AUTO: 1.91 K/UL (ref 1.82–7.42)
NEUTROPHILS NFR BLD: 91.1 % (ref 44–72)
NRBC # BLD AUTO: 0.07 K/UL
NRBC BLD-RTO: 3.4 /100 WBC (ref 0–0.2)
PLATELET # BLD AUTO: 108 K/UL (ref 164–446)
PLATELET BLD QL SMEAR: NORMAL
PMV BLD AUTO: 10.7 FL (ref 9–12.9)
POIKILOCYTOSIS BLD QL SMEAR: NORMAL
POTASSIUM SERPL-SCNC: 3.6 MMOL/L (ref 3.6–5.5)
POTASSIUM SERPL-SCNC: 3.7 MMOL/L (ref 3.6–5.5)
PROT SERPL-MCNC: 3.8 G/DL (ref 6–8.2)
RBC # BLD AUTO: 2.7 M/UL (ref 4.7–6.1)
RBC BLD AUTO: PRESENT
SODIUM SERPL-SCNC: 134 MMOL/L (ref 135–145)
SODIUM SERPL-SCNC: 136 MMOL/L (ref 135–145)
STOMATOCYTES BLD QL SMEAR: NORMAL
TOXIC GRANULES BLD QL SMEAR: NORMAL
VANCOMYCIN TROUGH SERPL-MCNC: <4 UG/ML (ref 10–20)
WBC # BLD AUTO: 2.1 K/UL (ref 4.8–10.8)

## 2025-04-03 PROCEDURE — 99233 SBSQ HOSP IP/OBS HIGH 50: CPT | Performed by: PEDIATRICS

## 2025-04-03 PROCEDURE — 700102 HCHG RX REV CODE 250 W/ 637 OVERRIDE(OP): Performed by: PEDIATRICS

## 2025-04-03 PROCEDURE — A9270 NON-COVERED ITEM OR SERVICE: HCPCS | Performed by: PEDIATRICS

## 2025-04-03 PROCEDURE — 85027 COMPLETE CBC AUTOMATED: CPT

## 2025-04-03 PROCEDURE — 80048 BASIC METABOLIC PNL TOTAL CA: CPT

## 2025-04-03 PROCEDURE — 700105 HCHG RX REV CODE 258: Performed by: PEDIATRICS

## 2025-04-03 PROCEDURE — 80053 COMPREHEN METABOLIC PANEL: CPT

## 2025-04-03 PROCEDURE — 80202 ASSAY OF VANCOMYCIN: CPT

## 2025-04-03 PROCEDURE — 700111 HCHG RX REV CODE 636 W/ 250 OVERRIDE (IP): Performed by: PEDIATRICS

## 2025-04-03 PROCEDURE — 700111 HCHG RX REV CODE 636 W/ 250 OVERRIDE (IP): Mod: JZ | Performed by: PEDIATRICS

## 2025-04-03 PROCEDURE — 770004 HCHG ROOM/CARE - ONCOLOGY PRIVATE *

## 2025-04-03 PROCEDURE — 85007 BL SMEAR W/DIFF WBC COUNT: CPT

## 2025-04-03 RX ADMIN — SODIUM CHLORIDE: 9 INJECTION, SOLUTION INTRAVENOUS at 08:56

## 2025-04-03 RX ADMIN — ANTACID TABLETS 1500 MG: 500 TABLET, CHEWABLE ORAL at 12:02

## 2025-04-03 RX ADMIN — Medication 1 APPLICATOR: at 05:52

## 2025-04-03 RX ADMIN — VANCOMYCIN HYDROCHLORIDE 1500 MG: 5 INJECTION, POWDER, LYOPHILIZED, FOR SOLUTION INTRAVENOUS at 05:59

## 2025-04-03 RX ADMIN — VANCOMYCIN HYDROCHLORIDE 1500 MG: 5 INJECTION, POWDER, LYOPHILIZED, FOR SOLUTION INTRAVENOUS at 12:04

## 2025-04-03 RX ADMIN — SODIUM CHLORIDE: 9 INJECTION, SOLUTION INTRAVENOUS at 19:54

## 2025-04-03 RX ADMIN — CEFEPIME 2 G: 2 INJECTION, POWDER, FOR SOLUTION INTRAVENOUS at 05:58

## 2025-04-03 RX ADMIN — DASATINIB 70 MG: 70 TABLET ORAL at 16:30

## 2025-04-03 RX ADMIN — ONDANSETRON 8 MG: 2 INJECTION INTRAMUSCULAR; INTRAVENOUS at 21:32

## 2025-04-03 RX ADMIN — CHLORHEXIDINE GLUCONATE, 0.12% ORAL RINSE 15 ML: 1.2 SOLUTION DENTAL at 21:25

## 2025-04-03 RX ADMIN — CEFEPIME 2 G: 2 INJECTION, POWDER, FOR SOLUTION INTRAVENOUS at 15:03

## 2025-04-03 RX ADMIN — PREDNISONE 60 MG: 50 TABLET ORAL at 09:01

## 2025-04-03 RX ADMIN — LORAZEPAM 1 MG: 2 INJECTION INTRAMUSCULAR; INTRAVENOUS at 09:08

## 2025-04-03 RX ADMIN — PREDNISONE 60 MG: 50 TABLET ORAL at 21:31

## 2025-04-03 RX ADMIN — CEFEPIME 2 G: 2 INJECTION, POWDER, FOR SOLUTION INTRAVENOUS at 21:36

## 2025-04-03 RX ADMIN — CHLORHEXIDINE GLUCONATE, 0.12% ORAL RINSE 15 ML: 1.2 SOLUTION DENTAL at 16:29

## 2025-04-03 RX ADMIN — HYDROCORTISONE: 1 CREAM TOPICAL at 16:30

## 2025-04-03 RX ADMIN — LORAZEPAM 1 MG: 2 INJECTION INTRAMUSCULAR; INTRAVENOUS at 15:53

## 2025-04-03 RX ADMIN — ANTACID TABLETS 1500 MG: 500 TABLET, CHEWABLE ORAL at 09:02

## 2025-04-03 RX ADMIN — DASATINIB 70 MG: 70 TABLET ORAL at 05:51

## 2025-04-03 RX ADMIN — HYDROCORTISONE: 1 CREAM TOPICAL at 05:52

## 2025-04-03 RX ADMIN — CHLORHEXIDINE GLUCONATE, 0.12% ORAL RINSE 15 ML: 1.2 SOLUTION DENTAL at 09:02

## 2025-04-03 RX ADMIN — ANTACID TABLETS 1500 MG: 500 TABLET, CHEWABLE ORAL at 16:28

## 2025-04-03 RX ADMIN — Medication 1 APPLICATOR: at 16:31

## 2025-04-03 RX ADMIN — VANCOMYCIN HYDROCHLORIDE 1500 MG: 5 INJECTION, POWDER, LYOPHILIZED, FOR SOLUTION INTRAVENOUS at 19:55

## 2025-04-03 RX ADMIN — Medication 1000 UNITS: at 05:51

## 2025-04-03 RX ADMIN — ONDANSETRON 8 MG: 2 INJECTION INTRAMUSCULAR; INTRAVENOUS at 00:15

## 2025-04-03 RX ADMIN — OXYCODONE HYDROCHLORIDE 5 MG: 5 TABLET ORAL at 15:08

## 2025-04-03 RX ADMIN — ONDANSETRON 8 MG: 2 INJECTION INTRAMUSCULAR; INTRAVENOUS at 14:51

## 2025-04-03 ASSESSMENT — PAIN DESCRIPTION - PAIN TYPE
TYPE: ACUTE PAIN

## 2025-04-03 NOTE — CARE PLAN
The patient is Watcher - Medium risk of patient condition declining or worsening    Shift Goals  Clinical Goals: Nausea control  Patient Goals: Pain/Nausea control  Family Goals: N/A    Progress made toward(s) clinical / shift goals:    Problem: Knowledge Deficit - Standard  Goal: Patient and family/care givers will demonstrate understanding of plan of care, disease process/condition, diagnostic tests and medications  Outcome: Progressing  Note: Pt is AO4. Call light within reach. Educated and understand POC. All questions answered at this time.     Problem: Pain - Standard  Goal: Alleviation of pain or a reduction in pain to the patient’s comfort goal  Outcome: Progressing     Problem: Psychosocial  Goal: Patient's level of anxiety will decrease  Outcome: Progressing       Patient is not progressing towards the following goals:

## 2025-04-03 NOTE — PROGRESS NOTES
"Pediatric Hematology/Oncology  Inpatient Progress Note      Patient Name:  Tomas Jean-Baptiste  : 2000   MRN: [id number]    Location of Service: [facility]    Date of Service: 4/3/2025  Time: 7:44 AM    Subjective:  Feels OK. His Left chest muscles hurt since lab draw this am.   He tried to pass stool last night and still has some pain.     Review of Systems:   Constitutional: Afebrile overnight. Energy and activity are low, but has been slowly improved the past few days.  HENT: Negative for ear pain, nasal congestion or rhinorrhea, nosebleeds and sore throat.  No mouth sores.  Eyes: Negative for visual changes.  Respiratory: Negative for shortness of breath or noisy breathing.   Cardiovascular: Negative for chest pain or extremity swelling.    Gastrointestinal: Negative for nausea, vomiting, abdominal pain, diarrhea. +tenesmus that is improving. No constipation or blood in stool.    Genitourinary: Negative for painful urination, blood in urine or flank pain.    Musculoskeletal: Negative for joint or muscle pains.    Skin: Negative for rash, signs of infection.  Neurological: Negative for numbness, tingling, sensory changes, weakness or headaches.    Endo/Heme/Allergies: Does not bruise/bleed easily.    Psychiatric/Behavioral: No changes in mood, appropriate for age.       OBJECTIVE:     Vitals:   Ambulatory Vitals  Encounter Vitals  Temperature: 36.4 °C (97.6 °F)  Temp src: Oral  Blood Pressure: 104/56  BP Location: Right, Upper Arm  Patient BP Position: Flores's Position  Pulse: 72  Respiration: 15  Pulse Oximetry: 98 %  O2 (LPM): 0  O2 Delivery Device: None - Room Air  Weight: 72.3 kg (159 lb 6.3 oz)  Weight Source: Stand Up Scale  Height: 165.1 cm (5' 5\")  BMI (Calculated): 26.52  Location: Abdomen  Description: Constant    Labs:  Recent Labs     25  0200 25  0201 25  0040   HEMOGLOBIN 8.4* 7.9* 7.9*   HEMATOCRIT 24.0* 23.1* 23.0*   MCV 84.5 " 84.3 85.2   MCH 29.6 28.8 29.3   MACROCYTOSIS 1+  --   --    ANISOCYTOSIS 1+ 1+  --    PLATELETCT 58* 85* 108*     Recent Labs     04/01/25  0815 04/02/25  1031 04/03/25  0040   SODIUM 136 134* 134*   POTASSIUM 3.8 3.9 3.7   CHLORIDE 102 100 99   CO2 25 25 24   GLUCOSE 147* 183* 240*   BUN 7* 6* 8     Recent Labs     04/01/25  0815 04/02/25  1031 04/03/25  0040   ALBUMIN  --   --  2.2*   TBILIRUBIN  --   --  0.5   ALKPHOSPHAT  --   --  142*   TOTPROTEIN  --   --  3.8*   ALTSGPT  --   --  180*   ASTSGOT  --   --  56*   CREATININE 0.43* 0.43* 0.53          Physical Exam:  Constitutional: NT in NAD.   HENT: Normocephalic and atraumatic. No nasal congestion or rhinorrhea. Oropharynx is clear and moist. No oral ulcerations or sores.    Eyes: Conjunctivae are normal. Pupils are equal, round, and reactive to light.    Neck: Normal range of motion of neck, no adenopathy.    Cardiovascular: Normal rate, regular rhythm and normal heart sounds. I/VI sys flow murmur best heard @LSB. DP/radial pulses 2+, cap refill < 2 sec  Pulmonary/Chest: Effort normal and breath sounds normal. No respiratory distress. Symmetric expansion.  No crackles or wheezes.  Abdomen: Soft. Bowel sounds are normal. No distension and no mass. There is no hepatosplenomegaly.    Genitourinary:  Deferred  Musculoskeletal: Normal range of motion of lower and upper extremities bilaterally. No tenderness to palpation of extremities.   Left pec, medial and lower regions, are mildly tender to palpation.   Lymphadenopathy: No cervical adenopathy, axillary adenopathy or inguinal adenopathy.   Neurological: Alert and oriented to person and place. Exhibits normal muscle tone bilaterally in upper and lower extremities. Gait normal. Coordination normal. C/o neuropathy at his toes > feet from prior anti-leukemic therapy..   Skin: Skin is warm, dry and pink.  No rash or evidence of skin infection.    Line is dressed. Site is c/d/i.   Psychiatric: Mood and affect normal  for age. 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 Addendum: Fortunately the sensitivities returned 4/2/25 afternoon. Pcn 0.03, vanco 0.5. Discussed with pharmacy: Pt has had a pcn allergy and is willing to face a rechallenge. Pt can likely be discontinued on Saturday 4/5/25. Will continue vancomycin for now. IF it recurs, we may attempt a pcn rechallenge via titration of a continuous infusion. Appreciate pharmacy.                - Hemodynamically stable and afebrile, will continue to monitor closely              - Vanco trough OK 3/27. 4/1/25 14.1 and changed to q8h dosing. Creatinine is higher, although it is where his baseline had been in the past. Will check another vanco trough prior to next dose.               - Continue abx until he has more reliable blood counts, specifically his ANC. If clinically doing well, consider discharge 4/5/25 vs start next cycle of anti-malignant therapy.      2) Constipation/Rectal Pain/Pain with Defecation/Hematochezia:   - Pain was resolved this past Saturday, then recurred. No longer as severe as upon admission, but still recurs when passing stool. This has been getting progressively better over the past 3 days.   - Morphine 2 mg IV every 3 hrs PRN for moderate-severe pain not controlled by oxycodone.   - Oxycodone 5-10 mg PO every 4 hrs PRN for moderate-severe pain  - Senna 2  tablets BID, titrate to effect  - Preparation H, apply externally   - Sitz bath PRN  - MR rectum not available as an inpatient. MRI pelvis demonstrated mild diffuse increase signal throughout the pelvic muscles possibly related to inflammation. No obvious sinus tract or fluid collection in the anus or ischial anal fossa.  - Will continue to monitor clinically. Seems likely due to ongoing neutropenia and likely has/had some level of mucositis.      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 Likely plan for HSCT, search for MUD ongoing. After discussing multiple options, they decided on a 3-Drug Re-Induction (VCR/PRED/PEG-ASP) +Imatinib followed by blinatumomab. 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 Met virtually with   Lc Adrian BMT 3/20/2025  - For CINV: Zofran 8 mg IV PRN, Ativan 1 mg IV PRN       3) Individualized Salvage Therapy, 3-Drug Re-Induction, Day 27 on 03APR2025:  ** Methotrexate 15 mg IT x 1 on Days 1 (COMPLETE, see separate procedure note)  ** Vincristine 2 mg IV x 1 on Days 1 (COMPLETE), 8 (COMPLETE), 15 (COMPLETE) and 22 (COMPLETE)  ** Prednisone 30 mg/m2/dose = 60 mg PO BID x 28 days   ** PEG-Aspargase 2000 IU/m2 x 1 on Day 4 in OPIC (COMPLETE)   ** Imatinib 400 mg PO QAM and 250 mg PO QHS (started 3/6/2025, given resistence, discontinued)  ** Dasatinib 70 mg PO BID, started 3/22/2025  ** On Pepcid BID while on corticosteroids. Pharmacist discussed with primary oncologist decrease in efficacy of Dasatinib. Switched to Tums and to be  by at least 2 hrs from Dasatinib administration.   **Anticipate Day 29 procedures tomorrow, Friday, 04APR025. NPO from tonight. OK for clears until 0200. Still awaiting confirmation for time of procedure.      4) Pancytopenia Secondary to leukemia and therapy:  - Follow CBC w/diff daily. ANC is recovering. ANC 1910. Plt rising on his own and now 108. Hgb stable at 7.9.   - Transfuse irradiated PRBC for Hgb<7 g/dL or symptomatic.   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 No clinical lesions  - Consider levofloxacin PO as outpatient if has prolonged neutropenia     6) FEN/GI:  - Regular diet  - IVF at 100 ml/hr  - CMP on Mon/Thurs, BMP other days     7) Hyperglycemia, Steroid-Induced:  - Mild elevated blood glucose. F/U BMP.  - No medicinal intervention at this time. Can check intermittent Udip.      8) Transaminitis Secondary To Therapy:   - AST: 56 U/L, ALT: 180 U/L 4/3/2025 stable if not improved.   - Bilirubin total 0.5  mg/dL 4/3/2025  - Will continue to monitor  - Monday & Thursday hepatic labs     9) Central Access:  - Double-lumen CVL placed 2/28/2025 by surgery  - Saline flush per protocol     10) Psychosocial:  - Dr. Ortega following     11) Social:              - Referred to Social Work and financial navigator     Disposition: Remain inpatient for treatment of bacteremia and F&N.        Wenceslao Hopper M.D.  Pediatric Hematology / Oncology  [facility]  Office. [phone]    1. Acute lymphoblastic leukemia (ALL) in relapse ([facility])  - MD Alert...Vancomycin per Pharmacy  - MD Alert...Vancomycin per Pharmacy  - MD Alert...Vancomycin per Pharmacy    2. Bacteremia  - MD Alert...Vancomycin per Pharmacy  - MD Alert...Vancomycin per Pharmacy  - MD Alert...Vancomycin per Pharmacy    3. Nausea  - scopolamine (Transderm-Scop) patch 1 Patch  - ondansetron (Zofran) syringe/vial injection 8 mg    4. Abdominal pain, unspecified abdominal location    5. Central line complication, initial encounter  - alteplase (Cathflo) syringe *Central Line Only* 2 mg    6. Hypovitaminosis D  - vitamin D3 (Cholecalciferol) tablet 1,000 Units    7. 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 Hypocalcemia  - calcium carbonate (Tums) chewable tab 1,500 mg    Other orders  - Admission Order: Patient will be admitted to the appropriate Med/Surg, CDU, Obstetrics or Pediatric Unit (Excludes ICU, IMC or Telemetry); Standing  - Full code; Standing  - Vital signs per nursing policy; Standing  - Notify Primary Care Physician of patient arrival and document name of Physician and office contacted in progress notes; Standing  - RN to place diet per age order based on patient needs/assessment; Standing  - Activity/Weight-bearing/Mobility Order- No Restrictions; Activity as Tolerated; Standing  - Height and Weight; Standing  - Head circumference  measurement upon Admisssion; Standing  - Intake And Output; Standing  - lidocaine-prilocaine (Emla) 2.5-2.5 % cream  - NS infusion  - Admission Order: Patient will be admitted to the appropriate Med/Surg, CDU, Obstetrics or Pediatric Unit (Excludes ICU, IMC or Telemetry)  - Full code  - Vital signs per nursing policy  - Notify Primary Care Physician of patient arrival and document name of Physician and office contacted in progress notes  - RN to place diet per age order based on patient needs/assessment  - Activity/Weight-bearing/Mobility Order- No Restrictions; Activity as Tolerated  - Height and Weight  - Head circumference measurement upon Admisssion  - Intake And Output  - Respiratory Oximetry (PEDS/NICU) Spot Check  - cefepime (Maxipime) 2 g in  mL IVPB  - acetaminophen (Tylenol) tablet 650 mg  - COD - Adult (Type and Screen); Standing  - COD - Adult (Type and Screen)  - Blood Culture; Standing  - Blood Culture; Standing  - Comp Metabolic Panel; Standing  - MAGNESIUM; Standing  - PHOSPHORUS; Standing  - Blood Culture  - Blood Culture  - Comp Metabolic Panel  - MAGNESIUM  - PHOSPHORUS  - morphine 4 MG/ML injection 2 mg  - oxyCODONE immediate-release (Roxicodone) tablet 5-10 mg; Refill: 0  - senna-docusate (Pericolace Or Senokot S) 8.6-50 MG per tablet 2 Tablet  - MR-PELVIS-WITH & W/O AND SEQUENCES; Standing  - MR-PELVIS-WITH & W/O AND SEQUENCES  - dasatinib (Sprycel) TABS 70 mg  - predniSONE (Deltasone) tablet 60 mg  - sulfamethoxazole-trimethoprim (Bactrim DS) 800-160 MG tablet 1 Tablet  - chlorhexidine (Peridex) 0.12 % solution 15 mL  - morphine 4 MG/ML injection 2 mg  - Diet Order Diet: Regular; Standing  - Diet Order Diet: Regular  - K Pad Motor; Standing  - K-Pad 14X20; Standing  - K Pad Motor  - K-Pad 14X20  - ESTIMATED GFR; Standing  - ESTIMATED GFR  - hydrocortisone 1 % cream  - Sitz Bath; Standing  - LORazepam (Ativan) injection 1 mg  - Consent for Blood Transfusion; Standing  - Consent for  Iodinated Contrast Administration; Standing  - Consent for Blood Transfusion  - Consent for Iodinated Contrast Administration  - CBC WITH DIFFERENTIAL; Standing  - CBC WITH DIFFERENTIAL  - IP Consult to Case Management; Standing  - IP Consult to Case Management  - Nozin nasal  swab  - DIFFERENTIAL MANUAL; Standing  - DIFFERENTIAL MANUAL  - PERIPHERAL SMEAR REVIEW; Standing  - PERIPHERAL SMEAR REVIEW  - PLATELET ESTIMATE; Standing  - PLATELET ESTIMATE  - MORPHOLOGY; Standing  - MORPHOLOGY  - IMMATURE PLT FRACTION; Standing  - IMMATURE PLT FRACTION  - Verify consent has been obtained; Standing  - Vital signs per blood transfusion policy; Standing  - PEDS RELEASE RED BLOOD CELLS (UNITS); Standing  - PEDS RELEASE PLATELET PHERESIS (UNITS); Standing  - Verify consent has been obtained  - CBC WITH DIFFERENTIAL; Standing  - Basic Metabolic Panel; Standing  - PLATELETS REQUEST; Standing  - PLATELETS REQUEST  - PEDS RELEASE PLATELET PHERESIS (UNITS)  - PEDS RELEASE RED BLOOD CELLS (UNITS)  - CBC WITH DIFFERENTIAL  - Basic Metabolic Panel  - MRSA By PCR (Amp); Standing  - MRSA By PCR (Amp)  - vancomycin (Vancocin) 1,750 mg in  mL IVPB  - Positioner Patient SM [id number] Accerssory; Standing  - Positioner Patient SM [id number] Accerssory  - ESTIMATED GFR; Standing  - ESTIMATED GFR  - DIFFERENTIAL MANUAL; Standing  - DIFFERENTIAL MANUAL  - PERIPHERAL SMEAR REVIEW; Standing  - PERIPHERAL SMEAR REVIEW  - PLATELET ESTIMATE; Standing  - PLATELET ESTIMATE  - MORPHOLOGY; Standing  - MORPHOLOGY  - IMMATURE PLT FRACTION; Standing  - IMMATURE PLT FRACTION  - CBC WITH DIFFERENTIAL  - DIFFERENTIAL MANUAL; Standing  - DIFFERENTIAL MANUAL  - PERIPHERAL SMEAR REVIEW; Standing  - PERIPHERAL SMEAR REVIEW  - PLATELET ESTIMATE; Standing  - PLATELET ESTIMATE  - MORPHOLOGY; Standing  - MORPHOLOGY  - IMMATURE PLT FRACTION; Standing  - IMMATURE PLT FRACTION  - PEDS RELEASE PLATELET PHERESIS (UNITS); Standing  - PLATELETS REQUEST;  Standing  - PLATELETS REQUEST  - VANCOMYCIN PEAK; Standing  - VANCOMYCIN TROUGH; Standing  - VANCOMYCIN PEAK  - PEDS RELEASE PLATELET PHERESIS (UNITS)  - VANCOMYCIN TROUGH  - Comp Metabolic Panel; Standing  - CBC WITH DIFFERENTIAL  - Comp Metabolic Panel  - ESTIMATED GFR; Standing  - ESTIMATED GFR  - DIFFERENTIAL MANUAL; Standing  - DIFFERENTIAL MANUAL  - PERIPHERAL SMEAR REVIEW; Standing  - PERIPHERAL SMEAR REVIEW  - PLATELET ESTIMATE; Standing  - PLATELET ESTIMATE  - MORPHOLOGY; Standing  - MORPHOLOGY  - IMMATURE PLT FRACTION; Standing  - IMMATURE PLT FRACTION  - Basic Metabolic Panel  - RELEASE RED BLOOD CELLS; Standing  - BLOOD CULTURE; Standing  - BLOOD CULTURE; Standing  - BLOOD CULTURE  - BLOOD CULTURE  - VITAMIN D,25 HYDROXY (DEFICIENCY); Standing  - VITAMIN D,25 HYDROXY (DEFICIENCY)  - RELEASE RED BLOOD CELLS  - ESTIMATED GFR; Standing  - ESTIMATED GFR  - VANCOMYCIN TROUGH; Standing  - VANCOMYCIN TROUGH  - gadoteridol (Prohance) injection 15 mL  - CBC WITH DIFFERENTIAL  - DIFFERENTIAL MANUAL; Standing  - DIFFERENTIAL MANUAL  - PERIPHERAL SMEAR REVIEW; Standing  - PERIPHERAL SMEAR REVIEW  - PLATELET ESTIMATE; Standing  - PLATELET ESTIMATE  - MORPHOLOGY; Standing  - MORPHOLOGY  - IMMATURE PLT FRACTION; Standing  - IMMATURE PLT FRACTION  - Basic Metabolic Panel  - ESTIMATED GFR; Standing  - ESTIMATED GFR  - VANCOMYCIN TROUGH LEVEL; Standing  - VANCOMYCIN PEAK; Standing  - CBC WITH DIFFERENTIAL  - DIFFERENTIAL MANUAL; Standing  - DIFFERENTIAL MANUAL  - PERIPHERAL SMEAR REVIEW; Standing  - PERIPHERAL SMEAR REVIEW  - PLATELET ESTIMATE; Standing  - PLATELET ESTIMATE  - MORPHOLOGY; Standing  - MORPHOLOGY  - IMMATURE PLT FRACTION; Standing  - IMMATURE PLT FRACTION  - Basic Metabolic Panel  - VANCOMYCIN PEAK  - VANCOMYCIN TROUGH LEVEL  - VITAMIN 1,25 DIHYDROXY (CALCIUM METABOLISM); Standing  - VITAMIN 1,25 DIHYDROXY (CALCIUM METABOLISM)  - WASHCLOTH PERINEAL CARE-COMFORT SHIELD; Standing  - WASHCLOTH PERINEAL  CARE-COMFORT SHIELD  - ESTIMATED GFR; Standing  - ESTIMATED GFR  - REFERENCE MISC.AMBIENT; Standing  - REFERENCE MISC.AMBIENT  - CBC WITH DIFFERENTIAL  - DIFFERENTIAL MANUAL; Standing  - DIFFERENTIAL MANUAL  - PERIPHERAL SMEAR REVIEW; Standing  - PERIPHERAL SMEAR REVIEW  - PLATELET ESTIMATE; Standing  - PLATELET ESTIMATE  - MORPHOLOGY; Standing  - MORPHOLOGY  - IMMATURE PLT FRACTION; Standing  - IMMATURE PLT FRACTION  - COD - Adult (Type and Screen); Standing  - COD - Adult (Type and Screen)  - Basic Metabolic Panel  - ESTIMATED GFR; Standing  - ESTIMATED GFR  - Insert IV; Standing  - Obtain Patient Consent for Blood; Standing  - Vital signs per blood transfusion policy; Standing  - Insert IV  - Obtain Patient Consent for Blood  - RELEASE RED BLOOD CELLS  - PLATELETS REQUEST; Standing  - PLATELETS REQUEST  - Positioner Patient SM [id number] Accerssory; Standing  - Positioner Patient SM [id number] Accerssory  - CBC WITH DIFFERENTIAL  - DIFFERENTIAL MANUAL; Standing  - DIFFERENTIAL MANUAL  - PERIPHERAL SMEAR REVIEW; Standing  - PERIPHERAL SMEAR REVIEW  - PLATELET ESTIMATE; Standing  - PLATELET ESTIMATE  - MORPHOLOGY; Standing  - MORPHOLOGY  - IMMATURE PLT FRACTION; Standing  - IMMATURE PLT FRACTION  - Basic Metabolic Panel  - ESTIMATED GFR; Standing  - ESTIMATED GFR  - Positioner Patient SM [id number] Accerssory; Standing  - Positioner Patient SM [id number] Accerssory  - CBC WITH DIFFERENTIAL  - Comp Metabolic Panel; Standing  - DIFFERENTIAL MANUAL; Standing  - DIFFERENTIAL MANUAL  - PERIPHERAL SMEAR REVIEW; Standing  - PERIPHERAL SMEAR REVIEW  - PLATELET ESTIMATE; Standing  - PLATELET ESTIMATE  - MORPHOLOGY; Standing  - MORPHOLOGY  - IMMATURE PLT FRACTION; Standing  - IMMATURE PLT FRACTION  - Basic Metabolic Panel  - ESTIMATED GFR; Standing  - ESTIMATED GFR  - CBC WITH DIFFERENTIAL  - Comp Metabolic Panel  - ESTIMATED GFR; Standing  - ESTIMATED GFR  - DIFFERENTIAL MANUAL; Standing  - DIFFERENTIAL MANUAL  - PERIPHERAL  SMEAR REVIEW; Standing  - PERIPHERAL SMEAR REVIEW  - PLATELET ESTIMATE; Standing  - PLATELET ESTIMATE  - MORPHOLOGY; Standing  - MORPHOLOGY  - IMMATURE PLT FRACTION; Standing  - IMMATURE PLT FRACTION  - Supplements; Standing  - Supplements  - CBC WITH DIFFERENTIAL  - DIFFERENTIAL MANUAL; Standing  - DIFFERENTIAL MANUAL  - PERIPHERAL SMEAR REVIEW; Standing  - PERIPHERAL SMEAR REVIEW  - PLATELET ESTIMATE; Standing  - PLATELET ESTIMATE  - MORPHOLOGY; Standing  - MORPHOLOGY  - IMMATURE PLT FRACTION; Standing  - IMMATURE PLT FRACTION  - Basic Metabolic Panel  - VANCOMYCIN TROUGH; Standing  - ESTIMATED GFR; Standing  - ESTIMATED GFR  - VANCOMYCIN TROUGH  - Positioner Patient SM [id number] Accerssory; Standing  - Positioner Patient SM [id number] Accerssory  - vancomycin (Vancocin) 1,500 mg in  mL IVPB  - CBC WITH DIFFERENTIAL  - DIFFERENTIAL MANUAL; Standing  - DIFFERENTIAL MANUAL  - PERIPHERAL SMEAR REVIEW; Standing  - PERIPHERAL SMEAR REVIEW  - PLATELET ESTIMATE; Standing  - PLATELET ESTIMATE  - MORPHOLOGY; Standing  - MORPHOLOGY  - IMMATURE PLT FRACTION; Standing  - IMMATURE PLT FRACTION  - Basic Metabolic Panel  - ESTIMATED GFR; Standing  - ESTIMATED GFR  - CBC WITH DIFFERENTIAL  - Comp Metabolic Panel  - ESTIMATED GFR; Standing  - ESTIMATED GFR  - DIFFERENTIAL MANUAL; Standing  - DIFFERENTIAL MANUAL  - PERIPHERAL SMEAR REVIEW; Standing  - PERIPHERAL SMEAR REVIEW  - PLATELET ESTIMATE; Standing  - PLATELET ESTIMATE  - MORPHOLOGY; Standing  - MORPHOLOGY  - Basic Metabolic Panel

## 2025-04-03 NOTE — DIETARY
Nutrition Services: Follow-up for PO Intake   Day 12 of admit. Tomas Jean-Baptiste is a 24 y.o. 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 Pt reports he is on a lot of antibiotics and chemo meds, but he is trying to eat as much as he can. States he has been having things like soup and smoothies. Per flowsheets, pt with 0% intake at 2 documented meals since last assessment.    Noted plan for procedure tomorrow. Per Dr. Hopper, pt can have regular texture meals today, but will be NPO starting at midnight (though allowed to have clears until 2am if awake). RD will update diet order to reflect this.       Current diet order:   Regular diet today. NPO tomorrow in preparation for procedure   Ensure Plus High Protein (provides 350 calories, 20 g protein per 8 fl oz) daily     Malnutrition risk: Unable to fully assess for malnutrition at this time      Nutrition Dx: Inadequate Oral Intake related to decreased appetite as evidenced by pt intakes <50% of meals.       Nutrition Dx Status: Ongoing     Problem: Nutritional:  Goal: Achieve adequate nutritional intake  Description: Patient will consume >50% of meals  Outcome: Not Met      Plan/ Recommendations:      Encourage intake of meals, goal for >50% consumption from meals and/or supplements  Continue Ensure plus high protein daily  Document intake of all meals as % taken in ADL's to provide interdisciplinary communication across all shifts.   Monitor weight.  Nutrition rep available to see patient for ongoing meal and snack preferences.     RD following

## 2025-04-04 ENCOUNTER — ANESTHESIA (OUTPATIENT)
Dept: SURGERY | Facility: MEDICAL CENTER | Age: 25
DRG: 393 | End: 2025-04-04
Payer: COMMERCIAL

## 2025-04-04 LAB
ANION GAP SERPL CALC-SCNC: 8 MMOL/L (ref 7–16)
BASOPHILS # BLD AUTO: 0 % (ref 0–1.8)
BASOPHILS # BLD: 0 K/UL (ref 0–0.12)
BUN SERPL-MCNC: 7 MG/DL (ref 8–22)
BURR CELLS/RBC NFR CSF MANUAL: 0 %
CALCIUM SERPL-MCNC: 7.4 MG/DL (ref 8.5–10.5)
CHLORIDE SERPL-SCNC: 101 MMOL/L (ref 96–112)
CLARITY CSF: CLEAR
CO2 SERPL-SCNC: 26 MMOL/L (ref 20–33)
COLOR CSF: COLORLESS
COLOR SPUN CSF: COLORLESS
CREAT SERPL-MCNC: 0.35 MG/DL (ref 0.5–1.4)
CYTOLOGY REG CYTOL: NORMAL
EOSINOPHIL # BLD AUTO: 0 K/UL (ref 0–0.51)
EOSINOPHIL NFR BLD: 0 % (ref 0–6.9)
ERYTHROCYTE [DISTWIDTH] IN BLOOD BY AUTOMATED COUNT: 45.3 FL (ref 35.9–50)
GFR SERPLBLD CREATININE-BSD FMLA CKD-EPI: 163 ML/MIN/1.73 M 2
GLUCOSE SERPL-MCNC: 142 MG/DL (ref 65–99)
HCT VFR BLD AUTO: 22.8 % (ref 42–52)
HGB BLD-MCNC: 7.7 G/DL (ref 14–18)
HGB RETIC QN AUTO: 34.8 PG/CELL (ref 29–35)
IMM RETICS NFR: 21.8 % (ref 2.6–16.1)
LYMPHOCYTES # BLD AUTO: 0.11 K/UL (ref 1–4.8)
LYMPHOCYTES NFR BLD: 2.7 % (ref 22–41)
LYMPHOCYTES NFR CSF: 80 %
MANUAL DIFF BLD: NORMAL
MAR PATH BX REPORT: NORMAL
MCH RBC QN AUTO: 28.8 PG (ref 27–33)
MCHC RBC AUTO-ENTMCNC: 33.8 G/DL (ref 32.3–36.5)
MCV RBC AUTO: 85.4 FL (ref 81.4–97.8)
METAMYELOCYTES NFR BLD MANUAL: 0.9 %
MONOCYTES # BLD AUTO: 0.07 K/UL (ref 0–0.85)
MONOCYTES NFR BLD AUTO: 1.8 % (ref 0–13.4)
MONOS+MACROS NFR CSF MANUAL: 20 %
MORPHOLOGY BLD-IMP: NORMAL
MYELOCYTES NFR BLD MANUAL: 1.8 %
NEUTROPHILS # BLD AUTO: 3.71 K/UL (ref 1.82–7.42)
NEUTROPHILS NFR BLD: 83.9 % (ref 44–72)
NEUTROPHILS NFR CSF: 0 %
NEUTS BAND NFR BLD MANUAL: 8.9 % (ref 0–10)
NRBC # BLD AUTO: 0.4 K/UL
NRBC BLD-RTO: 10.1 /100 WBC (ref 0–0.2)
NUC CELL # CSF: <1 CELLS/UL (ref 0–10)
PATHOLOGY CONSULT NOTE: NORMAL
PLATELET # BLD AUTO: 125 K/UL (ref 164–446)
PLATELET BLD QL SMEAR: NORMAL
PMV BLD AUTO: 10.6 FL (ref 9–12.9)
POIKILOCYTOSIS BLD QL SMEAR: NORMAL
POTASSIUM SERPL-SCNC: 3.9 MMOL/L (ref 3.6–5.5)
RBC # BLD AUTO: 2.67 M/UL (ref 4.7–6.1)
RBC # CSF: 1 CELLS/UL
RBC BLD AUTO: PRESENT
RETICS # AUTO: 0.01 M/UL (ref 0.04–0.12)
RETICS/RBC NFR: 0.6 % (ref 0.8–2.6)
SODIUM SERPL-SCNC: 135 MMOL/L (ref 135–145)
SPECIMEN VOL CSF: 2 ML
STOMATOCYTES BLD QL SMEAR: NORMAL
TUBE # CSF: 2
TUBE # CSF: NORMAL
WBC # BLD AUTO: 4 K/UL (ref 4.8–10.8)

## 2025-04-04 PROCEDURE — 88313 SPECIAL STAINS GROUP 2: CPT | Mod: 26 | Performed by: PATHOLOGY

## 2025-04-04 PROCEDURE — 85007 BL SMEAR W/DIFF WBC COUNT: CPT

## 2025-04-04 PROCEDURE — 80048 BASIC METABOLIC PNL TOTAL CA: CPT

## 2025-04-04 PROCEDURE — 700102 HCHG RX REV CODE 250 W/ 637 OVERRIDE(OP): Performed by: PEDIATRICS

## 2025-04-04 PROCEDURE — 700105 HCHG RX REV CODE 258: Performed by: PEDIATRICS

## 2025-04-04 PROCEDURE — 85046 RETICYTE/HGB CONCENTRATE: CPT

## 2025-04-04 PROCEDURE — 700101 HCHG RX REV CODE 250: Performed by: ANESTHESIOLOGY

## 2025-04-04 PROCEDURE — 160015 HCHG STAT PREOP MINUTES: Performed by: PEDIATRICS

## 2025-04-04 PROCEDURE — A9270 NON-COVERED ITEM OR SERVICE: HCPCS | Performed by: PEDIATRICS

## 2025-04-04 PROCEDURE — 85097 BONE MARROW INTERPRETATION: CPT | Performed by: PATHOLOGY

## 2025-04-04 PROCEDURE — 160027 HCHG SURGERY MINUTES - 1ST 30 MINS LEVEL 2: Performed by: PEDIATRICS

## 2025-04-04 PROCEDURE — 81208 BCR/ABL1 GENE OTHER BP: CPT

## 2025-04-04 PROCEDURE — 36415 COLL VENOUS BLD VENIPUNCTURE: CPT

## 2025-04-04 PROCEDURE — 81207 BCR/ABL1 GENE MINOR BP: CPT

## 2025-04-04 PROCEDURE — 96450 CHEMOTHERAPY INTO CNS: CPT | Performed by: PEDIATRICS

## 2025-04-04 PROCEDURE — 700111 HCHG RX REV CODE 636 W/ 250 OVERRIDE (IP): Mod: JZ | Performed by: PEDIATRICS

## 2025-04-04 PROCEDURE — 89051 BODY FLUID CELL COUNT: CPT

## 2025-04-04 PROCEDURE — 160048 HCHG OR STATISTICAL LEVEL 1-5: Performed by: PEDIATRICS

## 2025-04-04 PROCEDURE — 99233 SBSQ HOSP IP/OBS HIGH 50: CPT | Mod: 25 | Performed by: PEDIATRICS

## 2025-04-04 PROCEDURE — 88108 CYTOPATH CONCENTRATE TECH: CPT | Performed by: PATHOLOGY

## 2025-04-04 PROCEDURE — 770004 HCHG ROOM/CARE - ONCOLOGY PRIVATE *

## 2025-04-04 PROCEDURE — 700111 HCHG RX REV CODE 636 W/ 250 OVERRIDE (IP): Performed by: PEDIATRICS

## 2025-04-04 PROCEDURE — 160002 HCHG RECOVERY MINUTES (STAT): Performed by: PEDIATRICS

## 2025-04-04 PROCEDURE — 85060 BLOOD SMEAR INTERPRETATION: CPT | Performed by: PATHOLOGY

## 2025-04-04 PROCEDURE — 88305 TISSUE EXAM BY PATHOLOGIST: CPT | Mod: 26 | Performed by: PATHOLOGY

## 2025-04-04 PROCEDURE — 700102 HCHG RX REV CODE 250 W/ 637 OVERRIDE(OP): Performed by: ANESTHESIOLOGY

## 2025-04-04 PROCEDURE — 88184 FLOWCYTOMETRY/ TC 1 MARKER: CPT

## 2025-04-04 PROCEDURE — 160009 HCHG ANES TIME/MIN: Performed by: PEDIATRICS

## 2025-04-04 PROCEDURE — 81206 BCR/ABL1 GENE MAJOR BP: CPT

## 2025-04-04 PROCEDURE — A9270 NON-COVERED ITEM OR SERVICE: HCPCS | Performed by: ANESTHESIOLOGY

## 2025-04-04 PROCEDURE — 700111 HCHG RX REV CODE 636 W/ 250 OVERRIDE (IP): Performed by: ANESTHESIOLOGY

## 2025-04-04 PROCEDURE — 88108 CYTOPATH CONCENTRATE TECH: CPT | Mod: 26 | Performed by: PATHOLOGY

## 2025-04-04 PROCEDURE — 07DR3ZX EXTRACTION OF ILIAC BONE MARROW, PERCUTANEOUS APPROACH, DIAGNOSTIC: ICD-10-PCS | Performed by: PEDIATRICS

## 2025-04-04 PROCEDURE — 88305 TISSUE EXAM BY PATHOLOGIST: CPT | Performed by: PATHOLOGY

## 2025-04-04 PROCEDURE — 88313 SPECIAL STAINS GROUP 2: CPT | Performed by: PATHOLOGY

## 2025-04-04 PROCEDURE — 88185 FLOWCYTOMETRY/TC ADD-ON: CPT | Mod: 91

## 2025-04-04 PROCEDURE — 160035 HCHG PACU - 1ST 60 MINS PHASE I: Performed by: PEDIATRICS

## 2025-04-04 PROCEDURE — 38220 DX BONE MARROW ASPIRATIONS: CPT | Performed by: PEDIATRICS

## 2025-04-04 PROCEDURE — 85027 COMPLETE CBC AUTOMATED: CPT

## 2025-04-04 PROCEDURE — 3E0R305 INTRODUCTION OF OTHER ANTINEOPLASTIC INTO SPINAL CANAL, PERCUTANEOUS APPROACH: ICD-10-PCS | Performed by: PEDIATRICS

## 2025-04-04 RX ORDER — EPHEDRINE SULFATE 50 MG/ML
5 INJECTION, SOLUTION INTRAVENOUS
Status: DISCONTINUED | OUTPATIENT
Start: 2025-04-04 | End: 2025-04-04 | Stop reason: HOSPADM

## 2025-04-04 RX ORDER — DIPHENHYDRAMINE HYDROCHLORIDE 50 MG/ML
12.5 INJECTION, SOLUTION INTRAMUSCULAR; INTRAVENOUS
Status: DISCONTINUED | OUTPATIENT
Start: 2025-04-04 | End: 2025-04-04 | Stop reason: HOSPADM

## 2025-04-04 RX ORDER — SODIUM CHLORIDE, SODIUM LACTATE, POTASSIUM CHLORIDE, CALCIUM CHLORIDE 600; 310; 30; 20 MG/100ML; MG/100ML; MG/100ML; MG/100ML
INJECTION, SOLUTION INTRAVENOUS CONTINUOUS
Status: DISCONTINUED | OUTPATIENT
Start: 2025-04-04 | End: 2025-04-04 | Stop reason: HOSPADM

## 2025-04-04 RX ORDER — HYDRALAZINE HYDROCHLORIDE 20 MG/ML
5 INJECTION INTRAMUSCULAR; INTRAVENOUS
Status: DISCONTINUED | OUTPATIENT
Start: 2025-04-04 | End: 2025-04-04 | Stop reason: HOSPADM

## 2025-04-04 RX ORDER — OXYCODONE HCL 5 MG/5 ML
10 SOLUTION, ORAL ORAL
Status: COMPLETED | OUTPATIENT
Start: 2025-04-04 | End: 2025-04-04

## 2025-04-04 RX ORDER — SODIUM CHLORIDE, SODIUM LACTATE, POTASSIUM CHLORIDE, CALCIUM CHLORIDE 600; 310; 30; 20 MG/100ML; MG/100ML; MG/100ML; MG/100ML
INJECTION, SOLUTION INTRAVENOUS CONTINUOUS
Status: ACTIVE | OUTPATIENT
Start: 2025-04-04 | End: 2025-04-04

## 2025-04-04 RX ORDER — LABETALOL HYDROCHLORIDE 5 MG/ML
5 INJECTION, SOLUTION INTRAVENOUS
Status: DISCONTINUED | OUTPATIENT
Start: 2025-04-04 | End: 2025-04-04 | Stop reason: HOSPADM

## 2025-04-04 RX ORDER — HYDROMORPHONE HYDROCHLORIDE 1 MG/ML
0.2 INJECTION, SOLUTION INTRAMUSCULAR; INTRAVENOUS; SUBCUTANEOUS
Status: DISCONTINUED | OUTPATIENT
Start: 2025-04-04 | End: 2025-04-04 | Stop reason: HOSPADM

## 2025-04-04 RX ORDER — LIDOCAINE HYDROCHLORIDE 20 MG/ML
INJECTION, SOLUTION EPIDURAL; INFILTRATION; INTRACAUDAL; PERINEURAL PRN
Status: DISCONTINUED | OUTPATIENT
Start: 2025-04-04 | End: 2025-04-04 | Stop reason: SURG

## 2025-04-04 RX ORDER — HYDROMORPHONE HYDROCHLORIDE 1 MG/ML
0.1 INJECTION, SOLUTION INTRAMUSCULAR; INTRAVENOUS; SUBCUTANEOUS
Status: DISCONTINUED | OUTPATIENT
Start: 2025-04-04 | End: 2025-04-04 | Stop reason: HOSPADM

## 2025-04-04 RX ORDER — HALOPERIDOL 5 MG/ML
1 INJECTION INTRAMUSCULAR
Status: DISCONTINUED | OUTPATIENT
Start: 2025-04-04 | End: 2025-04-04 | Stop reason: HOSPADM

## 2025-04-04 RX ORDER — HYDROMORPHONE HYDROCHLORIDE 1 MG/ML
0.4 INJECTION, SOLUTION INTRAMUSCULAR; INTRAVENOUS; SUBCUTANEOUS
Status: DISCONTINUED | OUTPATIENT
Start: 2025-04-04 | End: 2025-04-04 | Stop reason: HOSPADM

## 2025-04-04 RX ORDER — ALBUTEROL SULFATE 5 MG/ML
2.5 SOLUTION RESPIRATORY (INHALATION)
Status: DISCONTINUED | OUTPATIENT
Start: 2025-04-04 | End: 2025-04-04 | Stop reason: HOSPADM

## 2025-04-04 RX ORDER — MEPERIDINE HYDROCHLORIDE 25 MG/ML
6.25 INJECTION INTRAMUSCULAR; INTRAVENOUS; SUBCUTANEOUS
Status: DISCONTINUED | OUTPATIENT
Start: 2025-04-04 | End: 2025-04-04 | Stop reason: HOSPADM

## 2025-04-04 RX ORDER — ONDANSETRON 2 MG/ML
4 INJECTION INTRAMUSCULAR; INTRAVENOUS
Status: DISCONTINUED | OUTPATIENT
Start: 2025-04-04 | End: 2025-04-04 | Stop reason: HOSPADM

## 2025-04-04 RX ORDER — OXYCODONE HCL 5 MG/5 ML
5 SOLUTION, ORAL ORAL
Status: COMPLETED | OUTPATIENT
Start: 2025-04-04 | End: 2025-04-04

## 2025-04-04 RX ADMIN — VANCOMYCIN HYDROCHLORIDE 1500 MG: 5 INJECTION, POWDER, LYOPHILIZED, FOR SOLUTION INTRAVENOUS at 22:13

## 2025-04-04 RX ADMIN — PREDNISONE 60 MG: 50 TABLET ORAL at 09:25

## 2025-04-04 RX ADMIN — CHLORHEXIDINE GLUCONATE, 0.12% ORAL RINSE 15 ML: 1.2 SOLUTION DENTAL at 17:08

## 2025-04-04 RX ADMIN — PROPOFOL 20 MG: 10 INJECTION, EMULSION INTRAVENOUS at 12:38

## 2025-04-04 RX ADMIN — OXYCODONE HYDROCHLORIDE 5 MG: 5 SOLUTION ORAL at 13:33

## 2025-04-04 RX ADMIN — PROPOFOL 20 MG: 10 INJECTION, EMULSION INTRAVENOUS at 12:30

## 2025-04-04 RX ADMIN — Medication 1000 UNITS: at 05:35

## 2025-04-04 RX ADMIN — CEFEPIME 2 G: 2 INJECTION, POWDER, FOR SOLUTION INTRAVENOUS at 21:10

## 2025-04-04 RX ADMIN — VANCOMYCIN HYDROCHLORIDE 1500 MG: 5 INJECTION, POWDER, LYOPHILIZED, FOR SOLUTION INTRAVENOUS at 14:41

## 2025-04-04 RX ADMIN — PREDNISONE 60 MG: 50 TABLET ORAL at 21:08

## 2025-04-04 RX ADMIN — PROPOFOL 20 MG: 10 INJECTION, EMULSION INTRAVENOUS at 12:32

## 2025-04-04 RX ADMIN — CEFEPIME 2 G: 2 INJECTION, POWDER, FOR SOLUTION INTRAVENOUS at 14:38

## 2025-04-04 RX ADMIN — METHOTREXATE 15 MG: 25 INJECTION INTRA-ARTERIAL; INTRAMUSCULAR; INTRATHECAL; INTRAVENOUS at 12:40

## 2025-04-04 RX ADMIN — PROPOFOL 20 MG: 10 INJECTION, EMULSION INTRAVENOUS at 12:34

## 2025-04-04 RX ADMIN — CHLORHEXIDINE GLUCONATE, 0.12% ORAL RINSE 15 ML: 1.2 SOLUTION DENTAL at 21:10

## 2025-04-04 RX ADMIN — HYDROCORTISONE: 1 CREAM TOPICAL at 05:35

## 2025-04-04 RX ADMIN — ONDANSETRON 8 MG: 2 INJECTION INTRAMUSCULAR; INTRAVENOUS at 14:34

## 2025-04-04 RX ADMIN — PROPOFOL 20 MG: 10 INJECTION, EMULSION INTRAVENOUS at 12:29

## 2025-04-04 RX ADMIN — PROPOFOL 20 MG: 10 INJECTION, EMULSION INTRAVENOUS at 12:36

## 2025-04-04 RX ADMIN — LORAZEPAM 1 MG: 2 INJECTION INTRAMUSCULAR; INTRAVENOUS at 17:18

## 2025-04-04 RX ADMIN — CEFEPIME 2 G: 2 INJECTION, POWDER, FOR SOLUTION INTRAVENOUS at 05:36

## 2025-04-04 RX ADMIN — PROPOFOL 70 MG: 10 INJECTION, EMULSION INTRAVENOUS at 12:27

## 2025-04-04 RX ADMIN — LIDOCAINE HYDROCHLORIDE 60 MG: 20 INJECTION, SOLUTION EPIDURAL; INFILTRATION; INTRACAUDAL; PERINEURAL at 12:27

## 2025-04-04 RX ADMIN — Medication 1 APPLICATOR: at 05:35

## 2025-04-04 RX ADMIN — PROPOFOL 20 MG: 10 INJECTION, EMULSION INTRAVENOUS at 12:40

## 2025-04-04 RX ADMIN — ANTACID TABLETS 1500 MG: 500 TABLET, CHEWABLE ORAL at 09:24

## 2025-04-04 RX ADMIN — SODIUM CHLORIDE, POTASSIUM CHLORIDE, SODIUM LACTATE AND CALCIUM CHLORIDE: 600; 310; 30; 20 INJECTION, SOLUTION INTRAVENOUS at 12:22

## 2025-04-04 RX ADMIN — ANTACID TABLETS 1500 MG: 500 TABLET, CHEWABLE ORAL at 17:07

## 2025-04-04 RX ADMIN — DASATINIB 70 MG: 70 TABLET ORAL at 17:09

## 2025-04-04 RX ADMIN — CHLORHEXIDINE GLUCONATE, 0.12% ORAL RINSE 15 ML: 1.2 SOLUTION DENTAL at 09:25

## 2025-04-04 RX ADMIN — Medication 1 APPLICATOR: at 17:08

## 2025-04-04 RX ADMIN — HYDROCORTISONE: 1 CREAM TOPICAL at 17:22

## 2025-04-04 RX ADMIN — ONDANSETRON 8 MG: 2 INJECTION INTRAMUSCULAR; INTRAVENOUS at 05:36

## 2025-04-04 RX ADMIN — ONDANSETRON 8 MG: 2 INJECTION INTRAMUSCULAR; INTRAVENOUS at 21:08

## 2025-04-04 RX ADMIN — DASATINIB 70 MG: 70 TABLET ORAL at 05:35

## 2025-04-04 RX ADMIN — PROPOFOL 20 MG: 10 INJECTION, EMULSION INTRAVENOUS at 12:42

## 2025-04-04 RX ADMIN — SODIUM CHLORIDE: 9 INJECTION, SOLUTION INTRAVENOUS at 22:14

## 2025-04-04 RX ADMIN — VANCOMYCIN HYDROCHLORIDE 1500 MG: 5 INJECTION, POWDER, LYOPHILIZED, FOR SOLUTION INTRAVENOUS at 04:19

## 2025-04-04 RX ADMIN — PROPOFOL 30 MG: 10 INJECTION, EMULSION INTRAVENOUS at 12:28

## 2025-04-04 ASSESSMENT — PAIN DESCRIPTION - PAIN TYPE
TYPE: SURGICAL PAIN
TYPE: ACUTE PAIN
TYPE: SURGICAL PAIN
TYPE: ACUTE PAIN

## 2025-04-04 NOTE — PROGRESS NOTES
"Pharmacy Chemotherapy Calculations:    Dx: relapsed B-ALL, ph+     Cycle 1, Day 29  Previous treatment = Induction D22 on 3/29/25; original tx finished 5/20/24    Protocol: Individualized 3 Drug Reinduction + TKI  IT methotrexate 15 mg for age >/=9 yrs on Days 1, , and 29    - no need for D8 LP for re-induction, CNS neg per Dr Faye  Vincristine 1.5 mg/m2 (max 2 mg) IV over 5-10 mins on Days 1, 8, 15, and 22  Prednisone 30 mg/m2 PO BID on Days 1-28  Pegaspargase 2000 units/m2 IV over 1-2 hrs on Day 4     - Switched to dasatinib 70 mg BID due to imatinib resistance  Dosing references: [facility] protocols [id number] and 1631 (imantinib)  Plan to transition to Blincyto consolidation after re-induction    Allergies:  Amoxicillin    /61   Pulse 73   Temp 36.8 °C (98.3 °F) (Temporal)   Resp 16   Ht 1.651 m (5' 5\")   Wt 72.3 kg (159 lb 6.3 oz)   SpO2 98%   BMI 26.52 kg/m²  Body surface area is 1.82 meters squared.    Labs from 4/4/25 reviewed - all within treatment parameters.        IT methotrexate 15 mg fixed dose for age >9 yrs   No Calc Req'd, ok for final dose = 15 mg IT injection by MD Judy Bryant, PharmD, BCOP    "

## 2025-04-04 NOTE — PROCEDURES
Pediatric Oncology Lumbar Puncture  Procedure Note      Patient Name:  Tomas Jean-Baptiste  : 2000   MRN: [id number]    Service Location: [facility] Operating Room 21  Date of Service: 2025  Time: 12:40 PM    Procedure Performed By: Pepe Faye M.D.    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 Suarez in OR    Intrathecal Chemotherapy:  Yes  Chemotherapy Administered:  Methotrexate 15 mg IT (in 6 mL NS)    Needle Size:  22 gauge, 3.5 in  Site: L3-L4  Number of Attempts: 1  Fluid:  6 ml clear fluid obtained  Labs: Cell count, cytology    Complications:  None    Procedure Note:    Tomas Jean-Baptiste is a 24 y.o. male diagnosed with Ph+ Acute B-Lymphoblastic Leukemia now in first relapse.  He presents today for scheduled chemotherapy,  End of Re-Induction and scheduled lumbar puncture with intrathecal chemotherapy.  Prior to the procedure, the risks and benefits were discussed with the patient.  Consent for the procedure was signed by the patient and placed in his chart.  All pertinent labs and history were reviewed and a complete History and Physical Examination were performed and placed in the medical record.  Tomas Roy was then taken to the operating room where the procedure was performed.  A time-out was performed and the patient identified by name,  and medical record number.  Proceeding lumbar puncture, bone marrow aspiration was performed successfully without any complications.  After completion, drapes and prepped were taken down and the patient was reprepped and draped for lumbar puncture.  Gloves and mask were worn during the entire procedure.  Tomas Roy was prepped and draped in the usual sterile fashion with povoiodine.  He remained positioned in  the left decubitus position and all landmarks including superior posterior iliac crest, umbilicus and vertebral bodies were identified again by palpation.  A 3.5 in, 22 gauge spinal needle was introduced into the L3-L4 spinal interspace.  6 ml of clear fluid were obtained and sent for cell count and cytology.  Methotrexate 15 mg in 6 mL of NS was verified with the nurse bedside and then then administered into the spinal fluid. Tomas Roy tolerated the procedure without complication or bleeding.     Pepe Faye MD  Pediatric Hematology / Oncology  [facility]  Cell.  [phone]

## 2025-04-04 NOTE — OR NURSING
1315- Report from Katelynn MARIANO, care assumed    1327- pt tolerating apple juice and saltines    1333- pt medicated per MAR for pain    1354- Report called to floor MONTSERRAT Harrington and patient placed on transport    1408- Patient transported up to CNU via gurney with patient transporter. Sent with all of his belongings.

## 2025-04-04 NOTE — PROGRESS NOTES
"Pharmacy Chemotherapy Verification Note:     Dx: B-ALL, PH+ relapsed         Protocol: Individualized 3 Drug Reinduction + Dasatinib (switched from imatinib 3/22)     IT methotrexate 15 mg for age >/=9 yrs on Days 1, and             3/14/25 Dr. Faye omitting Day 8, pt is CNS negative upon relapse.   3/21/25 Confirmed Dr. Parker  is holding day 29 as well.   4/4/25 Day 29 IT MTX to be given by Dr. Faye on CNU  Vincristine 1.5 mg/m2 (max 2 mg) IV over 5-10 mins on Days 1, 8, 15, and 22  Prednisone 30 mg/m2 PO BID on Days 1-28  Pegaspargase 2000 units/m2 IV over 1-2 hrs on Day 4  Dasatinib 70mg twice daily (standard dose)       Dosing references: [facility] protocols [id number] and 1631 (dasatinib)  Plan to transition to Blincyto consolidation after induction     Allergies:  Amoxicillin     /58   Pulse (!) 108   Temp 37.1 °C (98.8 °F) (Temporal)   Resp 18   Ht 1.65 m (5' 4.96\")   Wt 72.4 kg (159 lb 9.8 oz)   SpO2 99%   BMI 26.59 kg/m²   Body surface area is 1.82 meters squared.     Labs 4/4/25:  ANC~ 3710 Plt = 125 k   Hgb = 7.7       Labs 4/3/25:  SCr = 0.42 mg/dL CrCl > 125 mL/min (min SCr 0.7 used)  AST/ALT/AP = 56/180/142 TBili = 0.5       Drug Order   (Drug name, dose, route, IV Fluid & volume, frequency, number of doses) Cycle: Re-induction Day 29  Previous treatment: D22 3/29/25; completed initial treatment 5/30/24      Medication = IT methotrexate  Base Dose = 15 mg fixed dose for age   Calc Dose: n/a  Final Dose = 15 mg  Route = intraTHECAL  Fluid & Volume = NS 6 mL  Admin Duration = to be administered by MD    Days 1, 8, and 29   Dr. Faye omitting Day 8 and 29, pt is CNS negative upon relapse.        <10% difference, okay to treat with final dose   Medication = vincristine  Base Dose = 1.5 mg/m2 (max 2 mg)  Calc Dose: Base Dose x 1.82 m2 = 2.73 mg  Final Dose = 2 mg(MAX)  Route = IV  Fluid & Volume = NS 25 mL  Admin Duration = Over 5-10 mins    Days 1, 8, 15, and 22        okay to treat with " capped  final dose   Medication = pegaspargase (Oncaspar)  Base Dose = 2000 units/m2  Calc Dose:Base Dose x 1.9 m2 = 3800 units  Final Dose = 3720 units  Route = IV  Fluid & Volume =  mL  Admin Duration = Over 2 hrs    Day 4        <10% difference, okay to treat with final dose      By my signature below, I confirm this process was performed independently with the BSA and all final chemotherapy dosing calculations congruent. I have reviewed the above chemotherapy order and that my calculation of the final dose and BSA (when applicable) corroborate those calculations of the  pharmacist.      Marifer Torres, PharmD

## 2025-04-04 NOTE — CARE PLAN
The patient is Watcher - Medium risk of patient condition declining or worsening    Shift Goals  Clinical Goals: Patient will verbalize decrease anxitey and cope with diagnosis  Patient Goals: rest  Family Goals: N/a    Progress made toward(s) clinical / shift goals:    Problem: Knowledge Deficit - Standard  Goal: Patient and family/care givers will demonstrate understanding of plan of care, disease process/condition, diagnostic tests and medications  Outcome: Progressing   Pt is AO4. Call light within reach. Educated and understand POC. All questions answered at this time.    Patient is not progressing towards the following goals:      Problem: Psychosocial  Goal: Patient's level of anxiety will decrease  Outcome: Not Progressing  Pt was crying d/t his hair falling out. He stated that he thinks back to when he had originally been diagnosed and it was traumatizing. This nurse reassured patient that it was okay to cry and that it is understandable since hair is part of anyone's identity.   Goal: Patient and family will demonstrate ability to cope with life altering diagnosis and/or procedure  Outcome: Not Progressing

## 2025-04-04 NOTE — CARE PLAN
The patient is Watcher - Medium risk of patient condition declining or worsening    Shift Goals  Clinical Goals: Nausea control  Patient Goals: Pain/Nausea control  Family Goals: N/A    Progress made toward(s) clinical / shift goals: Pain and nausea well controlled at this time with MAR medications, anxiety very high, around eating and stooling    Problem: Pain - Standard  Goal: Alleviation of pain or a reduction in pain to the patient’s comfort goal  Outcome: Progressing  Note: Reported pain using 0 - 10 pain scale, Pain management medications administered as ordered      Problem: Fall Risk  Goal: Patient will remain free from falls  Outcome: Progressing  Note: Patient calls appropriately for toileting, patient wearing nonslip socks, patient belongings and call light within reach       Patient is not progressing towards the following goals:

## 2025-04-04 NOTE — OR NURSING
1252 Pt in PACU from OR, report from Dr. Suarez and OR RN. Pt lying left side, VSS, 2 puncture sites visible, no dressing, no drainage.     1305 Dr. Suarez at bedside to check on pt, pt sleeping, unable to rouse. VSS.      1515 Handoff to April RN.

## 2025-04-04 NOTE — ANESTHESIA PREPROCEDURE EVALUATION
Case: [id number] Date/Time: 04/04/25 1145    Procedures:       ASPIRATION, BONE MARROW, WITH BIOPSY      LUMBAR PUNCTURE, WITH DRAIN INSERTION    Anesthesia type: General    Location: [facility] ROOM 21 / SURGERY SAME DAY [facility]    Surgeons: Pepe Faye M.D.            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;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);BCR-ABL1 ([facility])   (positive) Bacteremia   (positive) Family history of diabetes mellitus   (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    Pertinent diagnostic labs and testing reviewed    Informed Consent:    Anesthetic plan and risks discussed with patient.    Use of blood products discussed with: patient whom consented to blood products.

## 2025-04-04 NOTE — H&P
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 Maintenance at which point he was actually feeling quite well clinically. No dose adjustments were necessary however his counts had started to drop at that point with WBC 0.9 and ANC dipping to 590.  On 7/27/2023, present Tomas Roy to clinic with nausea, vomiting, abdominal discomfort as well as decreased fatigue.  Blood counts obtained at the time demonstrated a WBC of 0.4, Hgb 8.8 and platelets 125.  ANC at the time was measured at only 170.  JULY was otherwise clinically well appearing.  He did receive a one-time normal saline bolus for his nausea and vomiting and was sent home with instructions to HOLD his oral mercaptopurine and oral methotrexate which began being held on 7/28/2023.  Per protocol, imatinib was continued at previous dose of 600 mg in the morning. Tomas Roy would return to clinic again on 8/2/2023 and 8/7/2023.  On both occasions, ANC remained under 500 and oral therapy  (with the exception of imatinib) continue to be held while awaiting count recovery.  Ultimately, on 8/11/2023 after 14 days of therapy being held, Tomas Roy returned to clinic and WBC had increased to 2.1 with ANC increasing to 1500 with an absolute monocyte count of 290. Tomas Roy was then instructed to resume his oral chemotherapy at 100% of prior dosing with (due to timing with Day 1 of Cycle 2 with LP 3 days later, no weekly MTX was administered).  Imatinib was never held.  On 8/14/2023 he was seen in clinic for Day 1 of Cycle 2 of Maintenance with lumbar puncture, vincristine, and 5-day pulse of steroids.  At the time, his ANC was 2010 and his oral chemotherapy was continued at 100% dosing.  Given his history of previously drop in counts, he was scheduled to come back for repeat labs on 8/29/2023 at which point his WBC count was 1.4 and his ANC remained greater than 500 at 1140.  Again, his therapy was continued with imatinib 550 mg by mouth in the morning as well as 100% dosing of methotrexate and 100% dosing of 6-mercaptopurine.  When Tomas Roy returned to clinic on 9/11/2023 for his Maintenance, Cycle 2, Day 29 therapy he was noted to have had another drop in his WBC to 600 and his ANC accordingly was also decreased at 410.  He did receive vincristine in accordance with protocol as well as starting a 5-day pulse of prednisone per protocol.  Given however that his ANC had dropped below 500 his oral methotrexate and oral 6-MP were again held.  He was instructed return to clinic 1 week later for lab evaluations.  On 9/19/2023 he returned to clinic and WBC had improved slightly to 0.8 however ANC remained low at 260 with an absolute monocyte count of 220.  Given that counts not recovered his oral chemotherapy remained held for an additional week.  On 9/26/2023 at 14 days after initially holding chemotherapy, he was seen back in clinic at which time WBC improved again to 1.1 however ANC did  not quite recover and remained at 490 with an absolute monocyte count of 330.  Given that 2 weeks had elapsed with myelosuppression, imatinib was held in addition to oral 6-MP and methotrexate. Tomas Roy was instructed to return to clinic on 9/29/2023 for repeat counts.  On 9/29/2023 WBC had improved to 2.4 and ANC had finally recovered with 1530 and an absolute monocyte count of 510.  Given a recovery with ANC greater than 750 and platelets greater than 75, oral chemotherapy was restarted at 50% of initial dosing and imatinib was restarted at 100% of initial dosing.  On 10/9/2023, Tomas Roy returned to clinic for his Day 57 of Cycle 2 visit.  At the time of his visit, his ANC had recovered after holding chemotherapy and then restarting at 50% dosing 11 days prior.  He did however in clinic spiked a temperature 102.5 °F.  For this reason despite his ANC being improved, no dose adjustments were made in his chemotherapy.  He continued with 50% dosing with 100% adherence of his 6-MP and methotrexate as well as his imatinib at 550 mg PO QHS.   Tomas Roy was then seen in clinic again on 11/6/2023 for his Day 1 of Cycle 3 of Maintenance therapy.  At the time, his imatinib dose was adjusted back up to 600 mg daily taken in the morning.  Additionally, his methotrexate was adjusted back up to 75% of expected dosing and his mercaptopurine was increased to 75% of expected dosing as well.  He will return to clinic on 12/4/2023 for his Day 29 of Cycle 3 therapy.  At the time, his ANC was exactly 1500 and therefore no dose adjustments were made and he continued at 75% dosing for oral chemotherapy as well as the imatinib dose of 600 mg daily.  On 1/2/2024 he was seen for his Day 57 evaluations and his ANC had dropped to 1450 again not prompting any change in oral chemotherapy dosing.  Tomas Roy completed his Cycle 4 chemotherapy without any adverse events or complications.  He did not have any changes  "in medications either.  Most recently,  Tomas Roy was seen in clinic on 4/22/2023 for his Day 1 of Cycle 5 chemotherapy.  At the time, a lumbar puncture was performed and IT chemotherapy was provided.  He tolerated the procedure and the therapy well without any sequelae.  His ANC at the time was > 1500 however the dose adjustment was made as this was the first ANC greater than 1500 and Tomas Roy had a history of precipitous drops in his counts with increases in his oral chemotherapy.  On 5/20/2024, Tomas Roy presented to clinic for his Cycle 5, Day 29 therapy and evaluations. At that time, he was started on 5 day pulse of prednisone and his oral methotrexate dose was increased to 13 tablets PO weekly (90.78% of expected dosing). His port was removed on 5/20/2024 by Dr. Moise. He completed therapy on 5/30/2024.  Since his completion of therapy and poor removal, he has been seen in follow-up and was most recently seen on 1/15/2025 at which point there was no evidence of relapse or recurrence both clinically or in his labs.  Interval history however is remarkable for upper respiratory symptoms approximately 1-1/2 weeks ago.  At the time, he reported having some nausea and vomiting as well as an upset stomach and sore throat.  The symptoms were thought most likely to be viral and in fact improved consistent with viral illness.  On about 2/24/2025 however symptoms returned and were more flulike in nature with aches and pains and low-grade temperatures.  On the evening of 2/26/2025, JULY called Pediatric Hematology/Oncology to report that he \"feels like I am relapsing\". Tomas Roy was brought in to clinic today for evaluations.  In clinic, CBC, CMP, respiratory viral panel and EBV studies were obtained.  Respiratory viral studies were negative.  CBC however demonstrated a white blood cell count of 1, hemoglobin 10.6, platelets of 53 without any circulating blasts.  His CMP for the most part was " normal with mild hyponatremia.  Uric acid was 7.2 and an LDH of 244.  Given these lab values as well as a temperature of 100.6 while in clinic also in the context of an acute infection of his right fourth phalanx, decision was made to bring JULY back to the hospital for admission for fever and neutropenia as well as workup of presumed relapsed disease.  He was admitted on 2/27/2025.  Workup was remarkable for pancytopenia.  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  In addition to his diagnostic bone marrow and diagnostic lumbar puncture which was negative for disease (CNS 1) he also had a 7 Turkmen double-lumen Broviac placed on the same day.  He was started on high-dose prednisone on his admission.  After results confirmed relapsed disease, his case was discussed with [facility] and it was decided that he should receive a 3 drug reinduction.  As such, lumbar puncture with intrathecal chemotherapy was administered on 3/6/2025 and he was given his Day 1 vincristine.  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  On 3/22/2025, Tomas Roy reported significant perirectal pain, especially with stooling and also reported bloody and mucus filled stools at home.  For this reason in addition to not feeling well he was brought in for admission.  On admission he was noted to be neutropenic and also developed fever prompting his stay for fever and neutropenia.  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  He continued with pancytopenia until approximately 3 days ago when counts began to recover at Day 26 of therapy.  Today, he is Day 29 and is scheduled for End of Reinduction evaluations.      Overnight, afebrile.  No acute events.  Overall  feeling well this morning.  Does continue to complain of some mild perirectal pain with defecation.  However this is mostly resolved.  No complaints of any headaches, changes in vision or neurologic status change.  No complaints of any nausea, vomiting or diarrhea.  No abdominal discomfort or pain.  Not complaining of any skin changes or rashes.  No aches or pains.  No other concerns or complaints at this time.    Review of Systems:     Constitutional: Afebrile.  Currently hospitalized for resolving fever and neutropenia.  No active infection.  Energy improved.  HENT: Negative for auditory changes, nosebleeds and sore throat.  No mouth sores.  Eyes: Negative for visual changes.  Respiratory: Negative for  shortness of breath or cough.  Cardiovascular: Negative.  Gastrointestinal: Negative for nausea, vomiting, abdominal pain, diarrhea, constipation and blood in stool.    Genitourinary: Negative for dysuria and flank pain.    Musculoskeletal: Negative.  Skin: Negative for rash, signs of infection.  Neurological: Negative for numbness, tingling, sensory changes, weakness or headaches.    Endo/Heme/Allergies: Does not bruise/bleed easily.    Psychiatric/Behavioral: No changes in mood, appropriate for age.     PAST MEDICAL HISTORY:     Oncologic History:  2-3 week history of worsening fatigue, right lower extremity pain  Presentation to OSH and diagnosed with right LE superficial thrombus, subsegmental PE and hyperleukocytosis, anemia and thrombocytopenia  Transferred to [facility] for definitive care  Presenting (local) WBC > 440K, Hgb 10.0, platelets 53, (automated differential ANC 3190, ALC 75,310, absolute monocyte count 69494, absolute blast count 340,560)  Uric Acid 15.6, phosphorus 5.6, LDH 1114  Rasburicase x 1 dose given   Peripheral Blood flow cytometry demonstrating CD10 pos, CD19 pos, CD20 neg, CD22 dim (60%) 5/28/2022  Peripheral blood FISH for BCR-ABL1 positive in 98% of analyzed cells     Age at Diagnosis: 21  years  White Blood Cell Count at Presentation: > 440 k/uL  Testicular Disease Status: Negative (see procedure note 5/30/2022)  CNS Status: 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, diagnostic LP with WBC 1, RBC 3 and no evidence of leukemic blasts 5/30/2022  Steroid Pre-treatment: None  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     All inclusion/exclusion criteria for [id number] met and consent signed - enrolled 5/29/2022   All inclusion/exclusion criteria for [id number] met and consent signed - enrolled 5/30/2022  Confirmatory bone marrow aspirate and biopsy and diagnostic LP + cytarabine 70 mg IT 5/30/2022  Induction therapy (ON STUDY [id number]) started 5/30/2022  Bone marrow immunohistochemistry consistent with diagnosis of B-ALL comprising 90% of marrow cellularity  Bone marrow [facility] [place]:  Flow cytom  Of the blood pressure little high that is a problem is a cultural problem is well and cultural genetic and everything else like that unfortunately breathalyzers such bad heart disease diabetes things like that  populations etry consistent with peripheral blood, cytogenetics remarkable for known t(9;22)  CSF with WBC 1, RBC 3, no blasts identified on cytospin  FISH results available 5/31/2022 making patient eligible for transfer from [place] [id number] to [place] [address] as eligibility requirements were met for [place] [address]  Patient unenrolled from [place] [address] (BCR-ABL1 fusion positive) 6/1/2022  Consent signed for [place] [address] and patient enrolled 6/1/2022 (see eligibility checklist from 5/31/2022 and consent note from 6/1/2022)  Imatinib 400 mg PO QAM / 200 mg PO QPM started 6/3/2022 (allowed per [place] [id number])  Patient completed the first 15 days of a Standard 4-drug Induction on 6/13/2022  Start of [id number](OS), Induction IA Part 2, Day 15 6/13/2022  End of Induction 1A Part 2 - MRD negative  Start of [facility](OS), Induction  "IA Part 2, Day 15 7/5/2022  Induction IB DELAYED 2 weeks 14 days from 7/26/2022-8/9/2022) for myelosuppression - Start of Day 22 cytarabine block 8/9/2022  Induction IB Day 42 delayed 9 days for myelosuppression - Day 42 evaluations 9/7/2022  End of Induction IB - Flow cytometric MRD negative, MRD by IgH-TCR PCR 00.0192423%  Randomization to AR-Experimental Arm B of [id number]  Start of AR-Experimental Arm B, Interim Maintenance 9/29/2022  Weight based increase in dose of imatinib to 400 mg PO AM and 250 mg PM 9/29/2022  Thrombocytopenia not permissive of proceeding with Day 15 of Interim Maintenance  AR-Experimental Arm B, Interim Maintenance, Day 15 delayed 4 days, start 10/17/2022  AR-Experimental Arm B, Interim Maintenance, Day 29, start 11/1/2022  AR-Experimental Arm B, Interim Maintenance, Day 43, start 11/14/2022  Last does of 6-MP 11/27/2022  AR-Experimental Arm B, Delayed Intensification, Day 1, start 12/6/2022  Admission with Severe Septic Shock 12/27/2022  Imatinib HELD 12/27/2022-1/8/2023  AR-Experimental Arm B, Delayed Intensification, Day 29 DELAYED 14 days with start 1/17/2023  Hospitalization 2/7/2023 on Day 50 of Delayed Intensification with left shoulder pain ultimately diagnosed with Bacteroides fragilis infection  AR-Experimental Arm B, Interim Maintenance, Day 1 DELAYED 7 days with start 2/27/2023  AR-Experimental Arm B, Interim Maintenance, Day 11 DELAYED 4 days for platelets 43K on 3/9/2023  AR-Experimental Arm B, Interim Maintenance, Day 11 VCR given and MTX omitted for platelets 43K 3/13/2023  Imatinib HELD 3/30/2023-4/11/2023  AR-Experimental Arm B, Interim Maintenance, Day 21 (DELAYED 22 DAYS) - administered 4/11/2023 with methotrexate 80 mg/m² IV  AR-Experimental Arm B, Interim Maintenance, Day 31 (\"true\" Day 31 4/25/2023) - VCR and IT MTX given 4/28/2023 - IV MTX omitted  AR-Experimental Arm B, Interim Maintenance, Day 41 met with counts - administered 5/5/2023 with methotrexate 80 mg/m² " IV     Start of Maintenance, Cycle 1, Day 1 -5/22/2023  Cranial radiation 6/5/2023-6/16/2023 (10 fractions)  Maintenance, Cycle 1, Day 29 - 6/23/2023 (no IT MTX given)  Maintenance, Cycle 1, Day 67, presented with fatigue nausea and vomiting found to have ANC less than 500  Methotrexate (oral) and mercaptopurine held 7/27/2023 - continued with imatinib  Methotrexate (oral) and mercaptopurine held 8/2/2023 - continued with imatinib  Methotrexate (oral) and mercaptopurine held 8/7/2023 - continued with imatinib  Restarted methotrexate (oral) and mercaptopurine at 100% previous dosing on 8/11/2023 - continued with imatinib  Maintenance, Cycle 2, Day 1 - 8/14/2023  Maintenance, Cycle 2, Day 29 - 9/11/2023  Methotrexate (oral) and mercaptopurine held 9/11/2023 - continued with imatinib  Methotrexate (oral) and mercaptopurine held 9/19/2023 - continued with imatinib  Methotrexate (oral) and mercaptopurine held 9/26/2023 - HELD imatinib  Methotrexate (oral) restarted at 50% dosing and mercaptopurine restarted at 50% dosing 9/29/2023 - RESTARTED imatinib  Maintenance, Cycle 2, Day 57 - 10/9/2023  Maintenance, Cycle 3, Day 1 - 11/6/2023: Methotrexate (oral) increased to 75% dosing and mercaptopurine increased to 75% dosing.  Imatinib increased to 600 mg QAM 11/6/2023  Maintenance Cycle 3, Day 29: 12/4/2023 No changes made to the dosing of oral chemotherapy  Maintenance, Cycle 4, Day 1: No dose adjustments made however ANC slightly greater than >1500.  Oral chemotherapy did not need weight adjustment.  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     Imatinib discontinued, dasatanib started 3/22/2025     Past Medical History:    1) Previously  "Healthy  2) 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  3) Hyperleukocytosis  4) Hyperuricemia  5) Hyperphosphatemia  6) Right Lower Extremity Superficial Thrombus  7) Subsegmental Pulmonary Embolism  8) 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  9) Bacteroides fragilis soft tissue infection left shoulder  10) Anxiety/Depression     Past Surgical History:     1) Temporary Right IJ Pharesis Catheter Placement 5/28/2022  2) Right-sided Port-A-Cath placement 8/29/2022  3) IR drainage left calf hematoma  4) Left shoulder I&D  5) Cranial XRT 6/5/2023-6/16/2023     Birth/Developmental History:   1st of three children  Unremarkable pregnancy  Unremarkable delivery     Family History:  No significant family history of cancer.  Both maternal and paternal family history of diabetes.     Immunizations:  UTD     Social History:   Lives with girlfriend.  Graduated from college.  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  Social situation with family is fractured.     OBJECTIVE:     Vitals:   /61   Pulse 73   Temp 36.8 °C (98.3 °F) (Temporal)   Resp 16   Ht 1.651 m (5' 5\")   Wt 72.3 kg (159 lb 6.3 oz)   SpO2 98%     Labs:     Latest Reference Range & Units 04/04/25 00:43 04/04/25 09:21   WBC 4.8 - 10.8 K/uL 4.0 (L)    RBC 4.70 - 6.10 M/uL 2.67 (L)    Hemoglobin 14.0 - 18.0 g/dL 7.7 (L)    Hematocrit 42.0 - 52.0 % 22.8 (L)    MCV 81.4 - 97.8 fL 85.4    MCH 27.0 - 33.0 pg 28.8    MCHC 32.3 - 36.5 g/dL 33.8    RDW 35.9 - 50.0 fL 45.3    Platelet Count 164 - 446 K/uL 125 (L)    MPV 9.0 - 12.9 fL 10.6    Neutrophils-Polys 44.00 - 72.00 % 83.90 (H)    Neutrophils (Absolute) 1.82 - 7.42 K/uL 3.71    Bands-Stabs 0.00 - 10.00 % 8.90    Lymphocytes 22.00 - 41.00 % 2.70 (L)    Lymphs (Absolute) 1.00 - 4.80 K/uL 0.11 (L)    Monocytes 0.00 - 13.40 % 1.80    Monos (Absolute) 0.00 - 0.85 K/uL 0.07    Eosinophils 0.00 - 6.90 % 0.00    Eos (Absolute) 0.00 - 0.51 K/uL 0.00    Basophils 0.00 - 1.80 % 0.00    Baso (Absolute) 0.00 - " 0.12 K/uL 0.00    Metamyelocytes % 0.90    Myelocytes % 1.80    Nucleated RBC 0.00 - 0.20 /100 WBC 10.10 (H)    NRBC (Absolute) K/uL 0.40    Plt Estimation  Decreased    RBC Morphology  Present    Poikilocytosis  1+    Stomatocytes  1+    Peripheral Smear Review  see below    Manual Diff Status  PERFORMED    Reticulocyte Count 0.8 - 2.6 % 0.6 (L)    Retic, Absolute 0.04 - 0.12 M/uL 0.01 (L)    Imm. Reticulocyte Fraction 2.6 - 16.1 % 21.8 (H)    Retic Hgb Equivalent 29.0 - 35.0 pg/cell 34.8    Sodium 135 - 145 mmol/L  135   Potassium 3.6 - 5.5 mmol/L  3.9   Chloride 96 - 112 mmol/L  101   Co2 20 - 33 mmol/L  26   Anion Gap 7.0 - 16.0   8.0   Glucose 65 - 99 mg/dL  142 (H)   Bun 8 - 22 mg/dL  7 (L)   Creatinine 0.50 - 1.40 mg/dL  0.35 (L)   GFR (CKD-EPI) >60 mL/min/1.73 m 2  163   Calcium 8.5 - 10.5 mg/dL  7.4 (L)   (L): Data is abnormally low  (H): Data is abnormally high    Physical Exam:    Constitutional: Well-developed, well-nourished, and in no distress.   Well-appearing.  HENT: Normocephalic and atraumatic. No nasal congestion or rhinorrhea. Oropharynx is clear and moist. No oral ulcerations or sores.    Eyes: Conjunctivae are normal. Pupils are equal, round.  EOMI.  Nonicteric.  Neck: Normal range of motion of neck, no adenopathy.    Cardiovascular: Normal rate, regular rhythm and normal heart sounds.  No murmur heard. DP/radial pulses 2+, cap refill < 2 sec.  Pulmonary/Chest: Effort normal and breath sounds normal. No respiratory distress. Symmetric expansion.  No crackles or wheezes.  Abdomen: Soft. Bowel sounds are normal. No distension and no mass. There is no hepatosplenomegaly.    Genitourinary:  Deferred.  Musculoskeletal: Normal range of motion of lower and upper extremities bilaterally. No tenderness to palpation of elbows, wrists, hands, knees, ankles and feet bilaterally.   Lymphadenopathy: No cervical adenopathy, axillary adenopathy or inguinal adenopathy.   Neurological: Alert and oriented to  person and place. Exhibits normal muscle tone bilaterally in upper and lower extremities. Gait normal. Coordination normal.    Skin: Skin is warm, dry and pink.  No rash or evidence of skin infection.  No pallor.   Psychiatric: Mood and affect normal for age.  Was depressed until he got a pep talk now, mood is good.    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 Addendum: Fortunately the sensitivities returned 4/2/25 afternoon. Pcn 0.03, vanco 0.5. Discussed with pharmacy: Pt has had a pcn allergy and is willing to face a rechallenge. Pt can likely be discontinued on Saturday 4/5/25. Will continue vancomycin for now. IF it recurs, we may attempt a pcn rechallenge via titration of a continuous infusion. Appreciate pharmacy.                - Hemodynamically stable and afebrile, will continue to monitor closely              - Vanco trough OK 3/27. 4/1/25 14.1 and changed to q8h dosing. Creatinine is higher, although it is where his baseline had been in the past. Will check another vanco trough prior to next dose.               - Continue abx until he has more reliable blood counts, specifically his ANC. If clinically doing well, consider discharge 4/5/25 vs start next cycle of anti-malignant therapy.      2) Constipation/Rectal Pain/Pain with Defecation/Hematochezia:   - Pain was resolved this past Saturday, then recurred. No longer as severe as upon admission, but still recurs  when passing stool. This has been getting progressively better over the past 3 days.   - Morphine 2 mg IV every 3 hrs PRN for moderate-severe pain not controlled by oxycodone.   - Oxycodone 5-10 mg PO every 4 hrs PRN for moderate-severe pain  - Senna 2 tablets BID, titrate to effect  - Preparation H, apply externally   - Sitz bath PRN  - MR rectum not available as an inpatient. MRI pelvis demonstrated mild diffuse increase signal throughout the pelvic muscles possibly related to inflammation. No obvious sinus tract or fluid collection in the anus or ischial anal fossa.  - Will continue to monitor clinically. Seems likely due to ongoing neutropenia and likely has/had some level of mucositis.      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 Likely plan for HSCT, search  for MUD ongoing. After discussing multiple options, they decided on a 3-Drug Re-Induction (VCR/PRED/PEG-ASP) +Imatinib followed by blinatumomab. 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 Met virtually with Dr. Adrian, [facility] 3/20/2025  - For CINV: Zofran 8 mg IV PRN, Ativan 1 mg IV PRN      - Has had discussions with both bone marrow transplant as well as some therapeutics (CAR-T) at [facility], will likely pursue two-stage CAR-T trial at [facility] as first line following reinduction  - End of Reinduction evaluations today we will dictate future plans     3) Individualized Salvage Therapy, 3-Drug Re-Induction, Day 29:  ** Methotrexate 15 mg IT x 1 on Days 1 (COMPLETE, see separate procedure note)  ** Vincristine 2 mg IV x 1 on Days 1 (COMPLETE), 8 (COMPLETE), 15 (COMPLETE) and 22 (COMPLETE)  ** Prednisone 30 mg/m2/dose = 60 mg PO BID x 28 days  (COMPLETE)  ** PEG-Aspargase 2000 IU/m2 x 1 on Day 4 in [facility] (COMPLETE)   ** Imatinib 400 mg PO QAM and 250 mg PO QHS (started 3/6/2025, given resistence, discontinued)  ** Continue Dasatinib 70 mg PO BID, started 3/22/2025  ** On Pepcid BID while on corticosteroids. Pharmacist discussed with primary oncologist decrease in efficacy of Dasatinib. Switched to Tums and to be  by at least 2 hrs from Dasatinib administration.     - End of Reinduction evaluations with bone marrow aspirate today in OR.  Will send for MRD analysis as well as BCR-ABL1 RT-PCR  - Will plan to perform lumbar puncture with methotrexate 15 mg IT x 1 (TODAY, see separate procedure note)      4) Pancytopenia Secondary to leukemia and therapy:  - Follow CBC w/diff daily. ANC is recovering. ANC 3710. Plt rising on his own and now 125. Hgb stable at 7.7.   - Transfuse irradiated PRBC for Hgb<7 g/dL or symptomatic.   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 No clinical lesions  - Consider levofloxacin PO as outpatient if has prolonged neutropenia     6) FEN/GI:  - Regular diet  - IVF at 100 ml/hr  - CMP on Mon/Thurs, BMP other days     7) Hyperglycemia, Steroid-Induced:  - Blood glucose today 142, steroids discontinued today.  - No medicinal intervention at this time.       8) Transaminitis Secondary To Therapy:   - AST: 56 U/L, ALT: 180 U/L 4/3/2025 stable if not improved.   - Bilirubin total 0.5 mg/dL 4/3/2025  - Will continue to monitor  - Monday & Thursday hepatic labs     9) Central Access:  - Double-lumen CVL placed 2/28/2025 by surgery  - Saline flush per protocol     10) Psychosocial:  - Dr. Ortega following     11) Social:              - Referred to Social Work and financial navigator     Disposition: Possible discharge home this afternoon.    Pepe Faye MD  Pediatric Hematology / Oncology  [facility]  Cell.  [phone]  Office. [phone]

## 2025-04-04 NOTE — PROGRESS NOTES
Chemotherapy Verification - SECONDARY RN       Height = 165.1 cm  Weight = 72.3 kg  BSA = 1.82 m2       Medication: IT Methotrexate  Dose: 15 mg  Calculated Dose: NA - Fixed Dose   (Ordered dose: 15 mg)                             (In mg/m2, AUC, mg/kg)       I confirm that this process was performed independently.

## 2025-04-04 NOTE — ANESTHESIA POSTPROCEDURE EVALUATION
Patient: Tomas Jean-Baptiste    Procedure Summary       Date: 04/04/25 Room / Location: [facility] ROOM 21 / SURGERY SAME DAY [facility]    Anesthesia Start: 1222 Anesthesia Stop: 1254    Procedures:       ASPIRATION, BONE MARROW (Back)      LUMBAR PUNCTURE, WITH METHOTREXATE ADMINITERED (Back) Diagnosis: ([place] POSITIVE, B ACUTE LYMPHOLASTIC LEUKEMIA)    Surgeons: Pepe Faye M.D. Responsible Provider: Lynnette Suarez M.D.    Anesthesia Type: MAC ASA Status: 3            Final Anesthesia Type: MAC  Last vitals  BP   Blood Pressure: 92/52    Temp   36.1 °C (96.9 °F)    Pulse   (!) 56   Resp   12    SpO2   100 %      Anesthesia Post Evaluation    Patient location during evaluation: PACU  Patient participation: complete - patient participated  Level of consciousness: awake and alert    Airway patency: patent  Anesthetic complications: no  Cardiovascular status: hemodynamically stable  Respiratory status: acceptable  Hydration status: euvolemic    PONV: none          No notable events documented.     Nurse Pain Score: 0 (NPRS)

## 2025-04-04 NOTE — DISCHARGE PLANNING
Case Management Discharge Planning    Admission Date: 3/22/2025  GMLOS: 3.8  ALOS: 13    6-Clicks ADL Score: 24  6-Clicks Mobility Score: 24      Anticipated Discharge Dispo: Discharge Disposition: Discharged to home/self care (01)    DME Needed: No    Action(s) Taken: RNCM completed chart review. Patient is planned to receive IT methotrexate today.     Escalations Completed: None    Medically Clear: No    Next Steps: pending oncology clearance     Barriers to Discharge: Medical clearance    Is the patient up for discharge tomorrow: No

## 2025-04-04 NOTE — ANESTHESIA TIME REPORT
Anesthesia Start and Stop Event Times       Date Time Event    4/4/2025 1210 Ready for Procedure     1222 Anesthesia Start     1254 Anesthesia Stop          Responsible Staff  04/04/25      Name Role Begin End    Lynnette Suarez M.D. Anesth 1222 1254          Overtime Reason:  no overtime (within assigned shift)    Comments:

## 2025-04-04 NOTE — PROGRESS NOTES
Chemotherapy Verification - PRIMARY RN      Height = 165.1 cm  Weight = 72.3 kg  BSA = 1.82 m2       Medication: IT Methotrexate  Dose: 15 mg fixed Dose  Calculated Dose: NA (Ordered Dose: 15 mg fixed dose)                            (In mg/m2, AUC, mg/kg)       I confirm this process was performed independently with the BSA and all final chemotherapy dosing calculations congruent.  Any discrepancies of 10% or greater have been addressed with the chemotherapy pharmacist. The resolution of the discrepancy has been documented in the EPIC progress notes.

## 2025-04-04 NOTE — PROCEDURES
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 Suarez in OR    Needle Size:  15 gauge I-type bone marrow aspiration needle  Site: Right Posterior Superior Iliac Crest    Complications: None    Bleeding: None    Procedure Note:    Tomas Jean-Baptiste is a 24 y.o. male who is currently hospitalized in the Cancer Nursing Unit.  He was admitted on 3/22/2025 with fever and neutropenia and found to have Rothia bacteremia.  He continues to remain hospitalized for resolution of his bacteremia and fever and neutropenia.  Today, is Day 29 of Reinduction therapy and he is brought to the OR for End of Reinduction bone marrow evaluations as well as lumbar puncture with IT methotrexate.  Prior to the procedure, the risks and benefits were discussed with the patient.  Consent for the procedure was signed by JULY and placed in the his chart.  All pertinent labs and history were reviewed and a complete physical examination performed prior to the procedure.  He was brought to the Operating Room in [facility] for sedation for his exam.  A time-out was performed and the patient identified by name,  and medical record number.  Gloves and mask were worn during the entire procedure.  The patient was prepped and draped in the usual sterile fashion and the site was cleansed with povoiodine (Betadine).   The patient was placed in the left lateral decubitus position.   All bony landmarks were palpated including both iliac crests and vertebral bodies.  The subcutaneous tissue and periosteum of the  right superior posterior iliac crest were infilrated with lidocaine.  An initial insertion was made with a 15 gauge I-type aspirate needle and resulted freely flowing marrow for slides as well as adequate specimen for send out for B-Lymphoblastic Leukemia MRD analysis as well as BCR-ABL RT-PCR. The patient tolerated the procedure without complications and only minimal bleeding.      Pepe Faye MD  Pediatric Hematology / Oncology  [facility]  Cell.  [phone]

## 2025-04-05 LAB
ANION GAP SERPL CALC-SCNC: 8 MMOL/L (ref 7–16)
ANISOCYTOSIS BLD QL SMEAR: ABNORMAL
BASOPHILS # BLD AUTO: 0 % (ref 0–1.8)
BASOPHILS # BLD: 0 K/UL (ref 0–0.12)
BUN SERPL-MCNC: 8 MG/DL (ref 8–22)
CALCIUM SERPL-MCNC: 7.6 MG/DL (ref 8.5–10.5)
CHLORIDE SERPL-SCNC: 104 MMOL/L (ref 96–112)
CO2 SERPL-SCNC: 26 MMOL/L (ref 20–33)
CREAT SERPL-MCNC: 0.42 MG/DL (ref 0.5–1.4)
EOSINOPHIL # BLD AUTO: 0 K/UL (ref 0–0.51)
EOSINOPHIL NFR BLD: 0 % (ref 0–6.9)
ERYTHROCYTE [DISTWIDTH] IN BLOOD BY AUTOMATED COUNT: 45.6 FL (ref 35.9–50)
GFR SERPLBLD CREATININE-BSD FMLA CKD-EPI: 154 ML/MIN/1.73 M 2
GLUCOSE SERPL-MCNC: 189 MG/DL (ref 65–99)
HCT VFR BLD AUTO: 24.7 % (ref 42–52)
HGB BLD-MCNC: 8.2 G/DL (ref 14–18)
LYMPHOCYTES # BLD AUTO: 0.18 K/UL (ref 1–4.8)
LYMPHOCYTES NFR BLD: 3.4 % (ref 22–41)
MANUAL DIFF BLD: NORMAL
MCH RBC QN AUTO: 28.8 PG (ref 27–33)
MCHC RBC AUTO-ENTMCNC: 33.2 G/DL (ref 32.3–36.5)
MCV RBC AUTO: 86.7 FL (ref 81.4–97.8)
METAMYELOCYTES NFR BLD MANUAL: 1.7 %
MICROCYTES BLD QL SMEAR: ABNORMAL
MONOCYTES # BLD AUTO: 0.18 K/UL (ref 0–0.85)
MONOCYTES NFR BLD AUTO: 3.4 % (ref 0–13.4)
MORPHOLOGY BLD-IMP: NORMAL
MYELOCYTES NFR BLD MANUAL: 0.9 %
NEUTROPHILS # BLD AUTO: 4.8 K/UL (ref 1.82–7.42)
NEUTROPHILS NFR BLD: 88.9 % (ref 44–72)
NEUTS BAND NFR BLD MANUAL: 1.7 % (ref 0–10)
NRBC # BLD AUTO: 0.87 K/UL
NRBC BLD-RTO: 16.4 /100 WBC (ref 0–0.2)
PLATELET # BLD AUTO: 156 K/UL (ref 164–446)
PLATELET BLD QL SMEAR: NORMAL
PMV BLD AUTO: 10.7 FL (ref 9–12.9)
POIKILOCYTOSIS BLD QL SMEAR: NORMAL
POTASSIUM SERPL-SCNC: 3.9 MMOL/L (ref 3.6–5.5)
RBC # BLD AUTO: 2.85 M/UL (ref 4.7–6.1)
RBC BLD AUTO: PRESENT
SODIUM SERPL-SCNC: 138 MMOL/L (ref 135–145)
STOMATOCYTES BLD QL SMEAR: NORMAL
WBC # BLD AUTO: 5.3 K/UL (ref 4.8–10.8)

## 2025-04-05 PROCEDURE — 700111 HCHG RX REV CODE 636 W/ 250 OVERRIDE (IP): Mod: JZ | Performed by: PEDIATRICS

## 2025-04-05 PROCEDURE — 80048 BASIC METABOLIC PNL TOTAL CA: CPT

## 2025-04-05 PROCEDURE — A9270 NON-COVERED ITEM OR SERVICE: HCPCS | Performed by: PEDIATRICS

## 2025-04-05 PROCEDURE — 85027 COMPLETE CBC AUTOMATED: CPT

## 2025-04-05 PROCEDURE — 700102 HCHG RX REV CODE 250 W/ 637 OVERRIDE(OP): Performed by: PEDIATRICS

## 2025-04-05 PROCEDURE — 700111 HCHG RX REV CODE 636 W/ 250 OVERRIDE (IP): Performed by: PEDIATRICS

## 2025-04-05 PROCEDURE — 770004 HCHG ROOM/CARE - ONCOLOGY PRIVATE *

## 2025-04-05 PROCEDURE — 99233 SBSQ HOSP IP/OBS HIGH 50: CPT | Performed by: PEDIATRICS

## 2025-04-05 PROCEDURE — 85007 BL SMEAR W/DIFF WBC COUNT: CPT

## 2025-04-05 RX ORDER — ONDANSETRON 2 MG/ML
8 INJECTION INTRAMUSCULAR; INTRAVENOUS EVERY 8 HOURS PRN
Status: DISCONTINUED | OUTPATIENT
Start: 2025-04-05 | End: 2025-04-09 | Stop reason: HOSPADM

## 2025-04-05 RX ADMIN — SCOPOLAMINE 1 PATCH: 1.5 PATCH, EXTENDED RELEASE TRANSDERMAL at 08:45

## 2025-04-05 RX ADMIN — CHLORHEXIDINE GLUCONATE, 0.12% ORAL RINSE 15 ML: 1.2 SOLUTION DENTAL at 14:19

## 2025-04-05 RX ADMIN — Medication 1 APPLICATOR: at 06:03

## 2025-04-05 RX ADMIN — ONDANSETRON 8 MG: 2 INJECTION INTRAMUSCULAR; INTRAVENOUS at 20:43

## 2025-04-05 RX ADMIN — MORPHINE SULFATE 2 MG: 4 INJECTION, SOLUTION INTRAMUSCULAR; INTRAVENOUS at 17:53

## 2025-04-05 RX ADMIN — ANTACID TABLETS 1500 MG: 500 TABLET, CHEWABLE ORAL at 17:56

## 2025-04-05 RX ADMIN — DASATINIB 70 MG: 70 TABLET ORAL at 06:04

## 2025-04-05 RX ADMIN — ANTACID TABLETS 1500 MG: 500 TABLET, CHEWABLE ORAL at 08:36

## 2025-04-05 RX ADMIN — Medication 1000 UNITS: at 06:03

## 2025-04-05 RX ADMIN — HYDROCORTISONE: 1 CREAM TOPICAL at 18:00

## 2025-04-05 RX ADMIN — MORPHINE SULFATE 2 MG: 4 INJECTION, SOLUTION INTRAMUSCULAR; INTRAVENOUS at 21:12

## 2025-04-05 RX ADMIN — ONDANSETRON 8 MG: 2 INJECTION INTRAMUSCULAR; INTRAVENOUS at 06:03

## 2025-04-05 RX ADMIN — HYDROCORTISONE: 1 CREAM TOPICAL at 06:04

## 2025-04-05 RX ADMIN — ANTACID TABLETS 1500 MG: 500 TABLET, CHEWABLE ORAL at 11:31

## 2025-04-05 RX ADMIN — ONDANSETRON 8 MG: 2 INJECTION INTRAMUSCULAR; INTRAVENOUS at 16:11

## 2025-04-05 RX ADMIN — SULFAMETHOXAZOLE AND TRIMETHOPRIM 1 TABLET: 800; 160 TABLET ORAL at 20:43

## 2025-04-05 RX ADMIN — LORAZEPAM 1 MG: 2 INJECTION INTRAMUSCULAR; INTRAVENOUS at 19:02

## 2025-04-05 RX ADMIN — SULFAMETHOXAZOLE AND TRIMETHOPRIM 1 TABLET: 800; 160 TABLET ORAL at 08:36

## 2025-04-05 RX ADMIN — CHLORHEXIDINE GLUCONATE, 0.12% ORAL RINSE 15 ML: 1.2 SOLUTION DENTAL at 17:56

## 2025-04-05 RX ADMIN — Medication 1 APPLICATOR: at 18:00

## 2025-04-05 RX ADMIN — CHLORHEXIDINE GLUCONATE, 0.12% ORAL RINSE 15 ML: 1.2 SOLUTION DENTAL at 08:36

## 2025-04-05 RX ADMIN — SENNOSIDES AND DOCUSATE SODIUM 2 TABLET: 50; 8.6 TABLET ORAL at 06:03

## 2025-04-05 RX ADMIN — LORAZEPAM 1 MG: 2 INJECTION INTRAMUSCULAR; INTRAVENOUS at 06:15

## 2025-04-05 RX ADMIN — SENNOSIDES AND DOCUSATE SODIUM 2 TABLET: 50; 8.6 TABLET ORAL at 17:56

## 2025-04-05 RX ADMIN — DASATINIB 70 MG: 70 TABLET ORAL at 17:57

## 2025-04-05 ASSESSMENT — PAIN DESCRIPTION - PAIN TYPE
TYPE: ACUTE PAIN

## 2025-04-05 NOTE — CARE PLAN
Problem: Knowledge Deficit - Standard  Goal: Patient and family/care givers will demonstrate understanding of plan of care, disease process/condition, diagnostic tests and medications  Outcome: Progressing     Problem: Fall Risk  Goal: Patient will remain free from falls  Outcome: Progressing     The patient is Watcher - Medium risk of patient condition declining or worsening    Shift Goals  Clinical Goals: Monitor labs, IV abx  Patient Goals: Rest  Family Goals: Rest    Progress made toward(s) clinical / shift goals:  pt updated on POC    Patient is not progressing towards the following goals:

## 2025-04-05 NOTE — CARE PLAN
The patient is Watcher - Medium risk of patient condition declining or worsening    Shift Goals  Clinical Goals: Patient will verbalize decrease anxitey and cope with diagnosis  Patient Goals: rest  Family Goals: N/a    Progress made toward(s) clinical / shift goals:    Problem: Pain - Standard  Goal: Alleviation of pain or a reduction in pain to the patient’s comfort goal  Outcome: Progressing     Problem: Fall Risk  Goal: Patient will remain free from falls  Outcome: Progressing     Problem: Psychosocial  Goal: Patient's level of anxiety will decrease  Outcome: Progressing       Patient is not progressing towards the following goals:

## 2025-04-05 NOTE — CARE PLAN
The patient is Watcher - Medium risk of patient condition declining or worsening    Shift Goals  Clinical Goals: Monitor labs, IV abx  Patient Goals: Rest  Family Goals: Rest    Progress made toward(s) clinical / shift goals:       Problem: Knowledge Deficit - Standard  Goal: Patient and family/care givers will demonstrate understanding of plan of care, disease process/condition, diagnostic tests and medications  Outcome: Progressing  Note: A/Ox4, patient able to understand plan of care, all questions answered at this time.      Problem: Fall Risk  Goal: Patient will remain free from falls  Outcome: Progressing  Note: Patient remained free from falls throughout shift. Bed locked and in lowest position, call light and belongings within reach. Room free from clutter. Patient calls appropriately.        Patient is not progressing towards the following goals:

## 2025-04-06 LAB
ANION GAP SERPL CALC-SCNC: 6 MMOL/L (ref 7–16)
BASOPHILS # BLD AUTO: 0.3 % (ref 0–1.8)
BASOPHILS # BLD: 0.02 K/UL (ref 0–0.12)
BUN SERPL-MCNC: 12 MG/DL (ref 8–22)
CALCIUM SERPL-MCNC: 7.7 MG/DL (ref 8.5–10.5)
CHLORIDE SERPL-SCNC: 101 MMOL/L (ref 96–112)
CO2 SERPL-SCNC: 26 MMOL/L (ref 20–33)
CREAT SERPL-MCNC: 0.45 MG/DL (ref 0.5–1.4)
EOSINOPHIL # BLD AUTO: 0 K/UL (ref 0–0.51)
EOSINOPHIL NFR BLD: 0 % (ref 0–6.9)
ERYTHROCYTE [DISTWIDTH] IN BLOOD BY AUTOMATED COUNT: 46.9 FL (ref 35.9–50)
GFR SERPLBLD CREATININE-BSD FMLA CKD-EPI: 151 ML/MIN/1.73 M 2
GLUCOSE SERPL-MCNC: 72 MG/DL (ref 65–99)
HCT VFR BLD AUTO: 25.2 % (ref 42–52)
HGB BLD-MCNC: 8.5 G/DL (ref 14–18)
IMM GRANULOCYTES # BLD AUTO: 0.2 K/UL (ref 0–0.11)
IMM GRANULOCYTES NFR BLD AUTO: 3.2 % (ref 0–0.9)
LYMPHOCYTES # BLD AUTO: 0.95 K/UL (ref 1–4.8)
LYMPHOCYTES NFR BLD: 15 % (ref 22–41)
MCH RBC QN AUTO: 29.7 PG (ref 27–33)
MCHC RBC AUTO-ENTMCNC: 33.7 G/DL (ref 32.3–36.5)
MCV RBC AUTO: 88.1 FL (ref 81.4–97.8)
MONOCYTES # BLD AUTO: 0.28 K/UL (ref 0–0.85)
MONOCYTES NFR BLD AUTO: 4.4 % (ref 0–13.4)
NEUTROPHILS # BLD AUTO: 4.89 K/UL (ref 1.82–7.42)
NEUTROPHILS NFR BLD: 77.1 % (ref 44–72)
NRBC # BLD AUTO: 0.66 K/UL
NRBC BLD-RTO: 10.4 /100 WBC (ref 0–0.2)
PLATELET # BLD AUTO: 146 K/UL (ref 164–446)
PMV BLD AUTO: 10.6 FL (ref 9–12.9)
POTASSIUM SERPL-SCNC: 3.6 MMOL/L (ref 3.6–5.5)
RBC # BLD AUTO: 2.86 M/UL (ref 4.7–6.1)
SODIUM SERPL-SCNC: 133 MMOL/L (ref 135–145)
WBC # BLD AUTO: 6.3 K/UL (ref 4.8–10.8)

## 2025-04-06 PROCEDURE — 700102 HCHG RX REV CODE 250 W/ 637 OVERRIDE(OP): Performed by: PEDIATRICS

## 2025-04-06 PROCEDURE — A9270 NON-COVERED ITEM OR SERVICE: HCPCS | Performed by: PEDIATRICS

## 2025-04-06 PROCEDURE — 700105 HCHG RX REV CODE 258: Performed by: PEDIATRICS

## 2025-04-06 PROCEDURE — 700111 HCHG RX REV CODE 636 W/ 250 OVERRIDE (IP): Performed by: PEDIATRICS

## 2025-04-06 PROCEDURE — 770004 HCHG ROOM/CARE - ONCOLOGY PRIVATE *

## 2025-04-06 PROCEDURE — 99233 SBSQ HOSP IP/OBS HIGH 50: CPT | Performed by: PEDIATRICS

## 2025-04-06 PROCEDURE — 80048 BASIC METABOLIC PNL TOTAL CA: CPT

## 2025-04-06 PROCEDURE — 85025 COMPLETE CBC W/AUTO DIFF WBC: CPT

## 2025-04-06 RX ORDER — HYDROCORTISONE SODIUM SUCCINATE 100 MG/2ML
100 INJECTION INTRAMUSCULAR; INTRAVENOUS ONCE
Status: COMPLETED | OUTPATIENT
Start: 2025-04-06 | End: 2025-04-06

## 2025-04-06 RX ADMIN — ACETAMINOPHEN 650 MG: 325 TABLET ORAL at 11:23

## 2025-04-06 RX ADMIN — DASATINIB 70 MG: 70 TABLET ORAL at 17:09

## 2025-04-06 RX ADMIN — HYDROCORTISONE: 1 CREAM TOPICAL at 05:08

## 2025-04-06 RX ADMIN — HYDROCORTISONE SODIUM SUCCINATE 100 MG: 100 INJECTION, POWDER, FOR SOLUTION INTRAMUSCULAR; INTRAVENOUS at 11:23

## 2025-04-06 RX ADMIN — CHLORHEXIDINE GLUCONATE, 0.12% ORAL RINSE 15 ML: 1.2 SOLUTION DENTAL at 17:08

## 2025-04-06 RX ADMIN — MORPHINE SULFATE 2 MG: 4 INJECTION, SOLUTION INTRAMUSCULAR; INTRAVENOUS at 00:18

## 2025-04-06 RX ADMIN — CHLORHEXIDINE GLUCONATE, 0.12% ORAL RINSE 15 ML: 1.2 SOLUTION DENTAL at 13:30

## 2025-04-06 RX ADMIN — OXYCODONE HYDROCHLORIDE 10 MG: 5 TABLET ORAL at 05:07

## 2025-04-06 RX ADMIN — Medication 1 APPLICATOR: at 17:09

## 2025-04-06 RX ADMIN — ANTACID TABLETS 1500 MG: 500 TABLET, CHEWABLE ORAL at 17:08

## 2025-04-06 RX ADMIN — SULFAMETHOXAZOLE AND TRIMETHOPRIM 1 TABLET: 800; 160 TABLET ORAL at 20:22

## 2025-04-06 RX ADMIN — ACETAMINOPHEN 650 MG: 325 TABLET ORAL at 17:08

## 2025-04-06 RX ADMIN — CHLORHEXIDINE GLUCONATE, 0.12% ORAL RINSE 15 ML: 1.2 SOLUTION DENTAL at 09:16

## 2025-04-06 RX ADMIN — SULFAMETHOXAZOLE AND TRIMETHOPRIM 1 TABLET: 800; 160 TABLET ORAL at 09:16

## 2025-04-06 RX ADMIN — HYDROCORTISONE: 1 CREAM TOPICAL at 17:09

## 2025-04-06 RX ADMIN — ANTACID TABLETS 1500 MG: 500 TABLET, CHEWABLE ORAL at 11:23

## 2025-04-06 RX ADMIN — OXYCODONE HYDROCHLORIDE 5 MG: 5 TABLET ORAL at 09:18

## 2025-04-06 RX ADMIN — SODIUM CHLORIDE: 9 INJECTION, SOLUTION INTRAVENOUS at 07:20

## 2025-04-06 RX ADMIN — DASATINIB 70 MG: 70 TABLET ORAL at 05:07

## 2025-04-06 RX ADMIN — Medication 1 APPLICATOR: at 05:07

## 2025-04-06 RX ADMIN — ANTACID TABLETS 1500 MG: 500 TABLET, CHEWABLE ORAL at 07:19

## 2025-04-06 RX ADMIN — Medication 1000 UNITS: at 05:07

## 2025-04-06 ASSESSMENT — PAIN DESCRIPTION - PAIN TYPE
TYPE: ACUTE PAIN

## 2025-04-06 NOTE — CARE PLAN
The patient is Watcher - Medium risk of patient condition declining or worsening    Shift Goals  Clinical Goals: Patient will have improvement in his pain and ambulate in the halls.  Patient Goals: Patient will have improvement in his pain.  Family Goals: SO sleeping    Progress made toward(s) clinical / shift goals:  Pain improved with use of tylenol and oxycodone. Order received for one time dose of steroids. Pain and lethargy improved. Patient sitting up in bed. Oral care performed.     Patient is not progressing towards the following goals:      Problem: Psychosocial  Goal: Patient's level of anxiety will decrease  Outcome: Not Progressing     Patient encouraged to ambulate to assist with weakness and energy levels. Blinds opened. Patient sitting up and more participatory in conversation.

## 2025-04-06 NOTE — CARE PLAN
The patient is Watcher - Medium risk of patient condition declining or worsening    Shift Goals  Clinical Goals: Monitor labs, pain control  Patient Goals: Rest, pain control  Family Goals: Rest    Progress made toward(s) clinical / shift goals:       Problem: Knowledge Deficit - Standard  Goal: Patient and family/care givers will demonstrate understanding of plan of care, disease process/condition, diagnostic tests and medications  Outcome: Progressing  Note: A/Ox4, patient able to understand plan of care, all questions answered at this time.      Problem: Pain - Standard  Goal: Alleviation of pain or a reduction in pain to the patient’s comfort goal  Outcome: Progressing  Note: Medicated with PRN pain medications. Pt states medications are effective in decreasing pain. Breathing WDL, resting comfortably.     Problem: Fall Risk  Goal: Patient will remain free from falls  Outcome: Progressing  Note: Patient remained free from falls throughout shift. Bed locked and in lowest position, call light and belongings within reach. Room free from clutter. Patient calls appropriately.        Patient is not progressing towards the following goals:

## 2025-04-06 NOTE — PROGRESS NOTES
Pediatric Hematology/Oncology  Daily Progress Note      Patient Name:  Tomas Jean-Baptiste  : 2000  MRN: [id number]    Location of Service:  [facility] - Cancer Nursing Unit  Date of Service: 2025  Time: 2:27 PM    Hospital Day: 16    Protocol / Treatment Plan: S/p Individualized Re-Induction chemotherapy, 3 Drug + Tyrosine Kinase Inhibitor, Now just on TKI     SUBJECTIVE:     Afebrile overnight. Complaining of back pain and diffuse muscle pain. Believes that its from steroid withdrawal. Taking Oxycodone for pain relief. No cough, congestion or difficulty breathing. No complaints of any nausea, vomiting or diarrhea. No abdominal discomfort or pain. Appetite remains below baseline. Had soft BM yesterday. Continues to complain of some mild perirectal pain with defecation. No complaints of any headaches, changes in vision or neurologic status change. Not complaining of any skin changes or rashes. No easy bruising.    Review of Systems:     Constitutional: Afebrile, diffuse body ache  HENT: Negative for auditory changes or ear pain, no nosebleeds, no congestion, no rhinorrhea, no sore throat.  No mouth sores. Scalp hair falling  Eyes: Negative for visual changes.  Respiratory: Negative for shortness of breath or cough..   Cardiovascular: Negative for chest pain and leg swelling.    Gastrointestinal: Negative for nausea, vomiting, abdominal pain, diarrhea, constipation. Blood in stool, mild pain with defecation.  Genitourinary: Negative for dysuria.  Musculoskeletal: Diffuse body ache  Skin: Negative for rash or skin infection.  Neurological: Negative for numbness, tingling, sensory changes, weakness or headaches.    Endo/Heme/Allergies: No bruising/bleeding easily.    Psychiatric/Behavioral: Stable mood.     OBJECTIVE:     Max Temp: Temp (24hrs), Av.6 °C (97.9 °F), Min:36.3 °C (97.3 °F), Max:37.1 °C (98.8 °F)      Vitals: /56   Pulse 100   Temp 36.3 °C (97.3 °F)  "(Temporal)   Resp 18   Ht 1.651 m (5' 5\")   Wt 72.3 kg (159 lb 6.3 oz)   SpO2 98%   BMI 26.52 kg/m²     I/O:   Intake/Output Summary (Last 24 hours) at 4/6/2025 1427  Last data filed at 4/6/2025 1000  Gross per 24 hour   Intake 237 ml   Output 950 ml   Net -713 ml       Labs:    Most Recent Hematology Labs:    Lab Results   Component Value Date/Time    WBC 6.3 04/06/2025 0010    HEMOGLOBIN 8.5 (L) 04/06/2025 0010    MCV 88.1 04/06/2025 0010    PLATELETCT 146 (L) 04/06/2025 0010    NEUTS 4.89 04/06/2025 0010       Most Recent Metabolic Panel:    Lab Results   Component Value Date/Time    SODIUM 133 (L) 04/06/2025 0926    POTASSIUM 3.6 04/06/2025 0926    CHLORIDE 101 04/06/2025 0926    CO2 26 04/06/2025 0926    GLUCOSE 72 04/06/2025 0926    BUN 12 04/06/2025 0926    CREATININE 0.45 (L) 04/06/2025 0926    CALCIUM 7.7 (L) 04/06/2025 0926    ANION 6.0 (L) 04/06/2025 0926       Physical Exam:    Constitutional: Overall well appearing.  However,  in discomfort due to body aches. Non-toxic.  HENT: Normocephalic and atraumatic.  No rhinorrhea. Oropharynx is clear and moist.   Eyes: Conjunctivae are normal. EOMI. Non-icteric. Pupils equal and round.  Neck: Normal range of motion of neck, no adenopathy.    Cardiovascular: Normal rate, regular rhythm.  No murmur. DP/radial pulses 2+, cap refill < 2 sec  Pulmonary/Chest: Effort normal. No respiratory distress. Symmetric expansion.  No crackles or wheezes. DL CVC L chest wall, dressing C/D/I  Abdomen: Soft. Bowel sounds are normal. No distension and no mass. There is no hepatosplenomegaly.   Genitourinary:  Deferred  Musculoskeletal: Normal range of motion of lower and upper extremities bilaterally. No tenderness to palpation of elbows, wrists, hands, knees, ankles and feet bilaterally.   Neurological: Alert and oriented to person and place. Exhibits normal muscle tone bilaterally in upper and lower extremities. Gait normal. Coordination normal.    Skin: Skin is warm, dry " and pink.  No rash or evidence of skin infection.   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 Has completed therapy for Rothia.    1) Corticosteroid Induced Myopathy:              - Patient c/o back and diffuse body ache              - Tylenol PRN available for pain              - Heat packs PRN              - OK to spot dose Motrin. Lidocaine patch can be used if pain focal              - Using Oxycodone 5-10 mg PRN for pain. Instructed JULY to be cautious of using Oxycodone as it can cause constipation and worsening of pain with defecation/blood in stool              - Serum Sodium and Serum Glucose slightly lower then normal              - Concern for adrenal crisis, hence one dose of 100 mg IV hydrocortisone given              - Will continue to monitor      2) Constipation/Rectal Pain/Pain with Defecation/Hematochezia: RESOLVING  - Pain was resolved on 4/30/2025, then recurred. No longer as severe as upon admission, but still recurs when passing stool. This has been getting progressively better over the past few days.   - Morphine 2 mg IV every 3 hrs PRN for moderate-severe pain not controlled by oxycodone.   - Oxycodone 5-10 mg PO every 4 hrs PRN for moderate-severe pain  - Senna 2 tablets BID, titrate to effect  - Preparation H, apply externally   - Sitz bath PRN  - MR rectum not available as an inpatient. MRI pelvis demonstrated mild diffuse increase signal throughout the pelvic muscles possibly related to inflammation. No obvious sinus tract or fluid collection in the anus or ischial anal fossa.  - Will continue to monitor clinically.      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, received cranial radiation  6/5/2023 to 6/16/2023  - Testicular disease negative at diagnosis  - Several complications throughout therapy to include severe septic shock 12/27/2022 while in Delayed Intensification  - Completed therapy 5/30/2024  - Seen in clinic 2/27/2025: WBC 1000 cells/uL, Hgb 10.6 g/dL, platelets 53,000 cells/uL, ANC 10, , absolute monocyte count 20. 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 Likely plan for HSCT, search for MUD ongoing. After discussing multiple options, they decided on a 3-Drug Re-Induction (VCR/PRED/PEG-ASP) +Imatinib followed by blinatumomab. 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 Met virtually with Dr. Adrian, [facility] 3/20/2025    - Has had discussions with both bone marrow transplant as well as some therapeutics (CAR-T) at [facility], will likely pursue two-stage CAR-T trial at [facility] as first line following reinduction  - End of Reinduction evaluations today we will dictate future plans     4) S/p Individualized Salvage Therapy, 3-Drug Re-Induction:  ** Methotrexate 15 mg IT x 1 on Days 1 (COMPLETE, see separate procedure note)  ** Vincristine 2 mg IV x 1 on Days 1 (COMPLETE), 8 (COMPLETE), 15 (COMPLETE) and 22 (COMPLETE)  ** Prednisone 30 mg/m2/dose = 60 mg PO BID x 28 days  (COMPLETE)  ** PEG-Aspargase 2000 IU/m2 x 1 on  Day 4 in OPIC (COMPLETE)   ** Imatinib 400 mg PO QAM and 250 mg PO QHS (started 3/6/2025, given resistence, discontinued)  ** Continue Dasatinib 70 mg PO BID, started 3/22/2025  ** On Pepcid BID while on corticosteroids. Pharmacist discussed with primary oncologist decrease in efficacy of Dasatinib. Switched to Tums and to be  by at least 2 hrs from Dasatinib administration.      - S/p End of Reinduction evaluations with bone marrow aspirate 2025 in OR.  Sent for MRD analysis as well as BCR-ABL1 RT-PCR  - S/p lumbar puncture with methotrexate 15 mg IT x 1 on 2025 in OR (COMPLETE, see separate procedure note)      5) At Risk For Pancytopenia Secondary to leukemia and therapy:  - Follow CBC w/diff daily.   - WBC: 6300/microliters, Hemoglobin: 8.5 gm/dL, Platelets: 146,000/microliters  - ANC recovered and at 4890/microliters, A/microliters  - Transfuse irradiated PRBC for Hgb<7 g/dL or symptomatic.   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 No clinical lesions  - Chlorhexidine mouthwash 4 times daily    7) History of Rothia mucilaginosa Bacteremia:              - Blood cultures obtained on presentation 3/22/2025 from CVL (both lumens) with Rothia mucilaginosa               - 3/25/25 Blood cx negative to date              - Was placed on empiric cefepime and Flagyl upon admission               - Vancomycin started for Rothia on 3/23/2025              - Completed therapy and discontinued Cefepime, Flagyl and Vancomycin on 2025     8) FEN/GI:  - Regular diet  - IVF decreased to 50 ml/hr  - CMP on Mon/Thurs, BMP other days    9) At Risk For Malnutrition Secondary To Poor PO Intake:             - Albumin low at 2.2 gm/dL             - Will get prealbumin  level tomorrow             - Consider dietary consult, starting appetite stimulant, therapy     10) At Risk For Nausea and Vomiting Secondary To Therapy             - Zofran changed to PRN              - Scopolamine patch every 3 days             - Ativan IV PRN for breakthrough N/V available     11) Hyperglycemia, Steroid-Induced: RESOLVED    12) Transaminitis Secondary To Therapy:   - AST: 56 U/L, ALT: 180 U/L 4/3/2025 stable if not improved.   - Bilirubin total 0.5 mg/dL 4/3/2025  - Will continue to monitor  - Monday & Thursday hepatic labs    13) At Risk For Hypovitaminosis D             - Continue Vitamin D3 supplement daily     14) Central Access:  - Double-lumen CVL placed 2/28/2025 by surgery  - Saline flush per protocol     15) Psychosocial:  - Dr. Ortega following     16) Social:              - Referred to Social Work and financial navigator     Disposition: Management of pain at this point in time.     Bedside nurse, patient updated with plan. Encouraged to take a shower and walk around the unit with mask on.     Alyce Duenas MD  Pediatric Hematology / Oncology  [facility]  Cell.  [phone]  [facility]. [phone]

## 2025-04-06 NOTE — PROGRESS NOTES
"Patient lethargic and in pain this morning. States he is going trough \"steroid withdrawal\". Complains of generalized pain to his torso and legs. Offered tylenol but declined. Oxycodone given as needed with some relief. Na down to 133 and glucose 72. Vitals stable. Patient oriented x 4. MD notified.   "

## 2025-04-06 NOTE — PROGRESS NOTES
"Pediatric Hematology/Oncology  Daily Progress Note      Patient Name:  Tomas Jean-Baptiste  : 2000  MRN: [id number]    Location of Service:  [facility] [place] - Cancer Nursing Unit  Date of Service: 2025  Time: 11:17 PM    Hospital Day: 15    Protocol / Treatment Plan: S/p Individualized Re-Induction chemotherapy, 3 Drug + Tyrosine Kinase Inhibitor, Now just on TKI    SUBJECTIVE:     Afebrile overnight. Feeling overall well. Had one bloody stool yesterday evening, none since. Continues to complain of some mild perirectal pain with defecation. No complaints of any nausea, vomiting or diarrhea. No abdominal discomfort or pain. No complaints of any headaches, changes in vision or neurologic status change. Not complaining of any skin changes or rashes. No aches or pains. Going to shave his hair today as they have started to fall.    Review of Systems:     Constitutional: Afebrile, Energy improving.   HENT: Negative for auditory changes or ear pain, no nosebleeds, no congestion, no rhinorrhea, no sore throat.  No mouth sores. Scalp hair falling  Eyes: Negative for visual changes.  Respiratory: Negative for shortness of breath or cough..   Cardiovascular: Negative for chest pain and leg swelling.    Gastrointestinal: Negative for nausea, vomiting, abdominal pain, diarrhea, constipation. Blood in stool, mild pain with defecation.  Genitourinary: Negative for dysuria.  Musculoskeletal: Negative for arm pain or leg pain.    Skin: Negative for rash or skin infection.  Neurological: Negative for numbness, tingling, sensory changes, weakness or headaches.    Endo/Heme/Allergies: No bruising/bleeding easily.    Psychiatric/Behavioral: Stable mood.     OBJECTIVE:     Max Temp: Temp (24hrs), Av.8 °C (98.2 °F), Min:36.5 °C (97.7 °F), Max:37.1 °C (98.8 °F)      Vitals: /68   Pulse (!) 107   Temp 36.7 °C (98.1 °F) (Temporal)   Resp 18   Ht 1.651 m (5' 5\")   Wt 72.3 kg (159 lb 6.3 oz)   " SpO2 100%   BMI 26.52 kg/m²     I/O: No intake or output data in the 24 hours ending 04/05/25 2317    Labs:    Most Recent Hematology Labs:    Lab Results   Component Value Date/Time    WBC 5.3 04/05/2025 0043    HEMOGLOBIN 8.2 (L) 04/05/2025 0043    MCV 86.7 04/05/2025 0043    PLATELETCT 156 (L) 04/05/2025 0043    NEUTS 4.80 04/05/2025 0043       Most Recent Metabolic Panel:    Lab Results   Component Value Date/Time    SODIUM 138 04/05/2025 0043    POTASSIUM 3.9 04/05/2025 0043    CHLORIDE 104 04/05/2025 0043    CO2 26 04/05/2025 0043    GLUCOSE 189 (H) 04/05/2025 0043    BUN 8 04/05/2025 0043    CREATININE 0.42 (L) 04/05/2025 0043    CALCIUM 7.6 (L) 04/05/2025 0043    ANION 8.0 04/05/2025 0043       Physical Exam:    Constitutional: Overall well appearing.  Non-toxic.  HENT: Normocephalic and atraumatic.  No rhinorrhea. Oropharynx is clear and moist.   Eyes: Conjunctivae are normal. EOMI. Non-icteric. Pupils equal and round.  Neck: Normal range of motion of neck, no adenopathy.    Cardiovascular: Normal rate, regular rhythm.  No murmur. DP/radial pulses 2+, cap refill < 2 sec  Pulmonary/Chest: Effort normal. No respiratory distress. Symmetric expansion.  No crackles or wheezes. DL CVC along L chest wall. Dressing C/D/I  Abdomen: Soft. Bowel sounds are normal. No distension and no mass. There is no hepatosplenomegaly.   Genitourinary:  Deferred  Musculoskeletal: Normal range of motion of lower and upper extremities bilaterally. No tenderness to palpation of elbows, wrists, hands, knees, ankles and feet bilaterally.   Lymphadenopathy: No cervical adenopathy, axillary adenopathy or inguinal adenopathy.   Neurological: Alert and oriented to person and place. Exhibits normal muscle tone bilaterally in upper and lower extremities. Gait normal. Coordination normal.    Skin: Skin is warm, dry and pink.  No rash or evidence of skin infection.   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 Has completed therapy for Rothia.     1) Rothia mucilaginosa Bacteremia:              - Blood cultures obtained on presentation 3/22/2025 from CVL (both lumens) with Rothia mucilaginosa               - 3/25/25 Blood cx negative to date              - Was placed on empiric cefepime and Flagyl upon admission (continue empirically)              - Vancomycin started for Rothia on 3/23/2025              - Completed therapy and discontinued Cefepime, Flagyl and Vancomycin on 4/4/2025     2) Constipation/Rectal Pain/Pain with Defecation/Hematochezia: RESOLVING  - Pain was resolved on 4/30/2025, then recurred. No longer as severe as upon admission, but still recurs when passing stool. This has been getting progressively better over the past 3-4 days.   - Morphine 2 mg IV every 3 hrs PRN for moderate-severe pain not controlled by oxycodone.   - Oxycodone 5-10 mg PO every 4 hrs PRN for moderate-severe pain  - Senna 2 tablets BID, titrate to effect  - Preparation H, apply externally   - Sitz bath PRN  - MR rectum not available as an inpatient. MRI pelvis demonstrated mild diffuse increase signal throughout the pelvic muscles possibly related to inflammation. No obvious sinus tract or fluid collection in the anus or ischial anal fossa.  - Will continue to monitor clinically.      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 Likely plan for HSCT, search for MUD ongoing. After discussing multiple options, they decided on a 3-Drug Re-Induction (VCR/PRED/PEG-ASP) +Imatinib followed by blinatumomab. 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 Met virtually with Dr. Adrian, [facility] BMT 3/20/2025  - For CINV: Zofran 8 mg IV PRN, Ativan 1 mg IV PRN       - Has had discussions with both bone marrow transplant as well as some therapeutics (CAR-T) at [facility], will likely pursue two-stage CAR-T trial at [facility] as first line following reinduction  - End of Reinduction evaluations today we will dictate future plans     4) S/p Individualized Salvage Therapy, 3-Drug Re-Induction:  ** Methotrexate 15 mg IT x 1 on Days 1 (COMPLETE, see separate procedure note)  ** Vincristine 2 mg IV x 1 on Days 1 (COMPLETE), 8 (COMPLETE), 15 (COMPLETE) and 22 (COMPLETE)  ** Prednisone 30 mg/m2/dose = 60 mg PO BID x 28 days  (COMPLETE)  ** PEG-Aspargase 2000 IU/m2 x 1 on Day 4 in OPIC (COMPLETE)   ** Imatinib 400 mg PO QAM and 250 mg PO QHS (started 3/6/2025, given resistence, discontinued)  ** Continue Dasatinib 70 mg PO BID, started 3/22/2025  ** On Pepcid BID while on corticosteroids. Pharmacist discussed with primary oncologist  decrease in efficacy of Dasatinib. Switched to Tums and to be  by at least 2 hrs from Dasatinib administration.      - S/p End of Reinduction evaluations with bone marrow aspirate 2025 in OR.  Sent for MRD analysis as well as BCR-ABL1 RT-PCR  - S/p lumbar puncture with methotrexate 15 mg IT x 1 on 2025 in OR (COMPLETE, see separate procedure note)      5) At Risk For Pancytopenia Secondary to leukemia and therapy:  - Follow CBC w/diff daily.   - WBC: 5300/microliters, Hemoglobin: 8.2 gm/dL, Platelets: 156,000/microliters  - ANC recovered and at 4800/microliters, A/microliters  - Transfuse irradiated PRBC for Hgb<7 g/dL or symptomatic.   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 No clinical lesions  - Chlorhexidine mouthwash 4 times daily     7) FEN/GI:  - Regular diet  - IVF at 100 ml/hr, will decrease to 50 ml/hr  - CMP on Mon/Thurs, BMP other days    8) At Risk For Nausea and Vomiting Secondary To Therapy             - Zofran every 8 hrs scheduled: Will change to PRN today             - Scopolamine patch every 3 days             - Ativan IV PRN for breakthrough N/V available     9) Hyperglycemia, Steroid-Induced:  - Blood glucose today 189 mg/dL, steroids discontinued today.  - No medicinal intervention at this time.       10) Transaminitis Secondary To Therapy:   - AST: 56 U/L, ALT: 180 U/L 4/3/2025 stable if not improved.   - Bilirubin total 0.5 mg/dL 4/3/2025  - Will continue to monitor  - Monday & Thursday hepatic labs     11) Central Access:  - Double-lumen CVL placed 2025 by surgery  - Saline flush per protocol     12) Psychosocial:  - Dr. Ortega following     13) Social:              - Referred to Social Work and financial navigator      Disposition: Will stay inpatient atleast  until 4/7/2025     Alyce Duenas MD  Pediatric Hematology / Oncology  [facility]  Cell.  [phone]  [facility]. [phone]

## 2025-04-07 LAB
ALBUMIN SERPL BCP-MCNC: 2.2 G/DL (ref 3.2–4.9)
ALBUMIN/GLOB SERPL: 1.6 G/DL
ALP SERPL-CCNC: 158 U/L (ref 30–99)
ALT SERPL-CCNC: 248 U/L (ref 2–50)
ANION GAP SERPL CALC-SCNC: 6 MMOL/L (ref 7–16)
ANION GAP SERPL CALC-SCNC: 7 MMOL/L (ref 7–16)
ANISOCYTOSIS BLD QL SMEAR: ABNORMAL
AST SERPL-CCNC: 112 U/L (ref 12–45)
BASOPHILS # BLD AUTO: 0 % (ref 0–1.8)
BASOPHILS # BLD: 0 K/UL (ref 0–0.12)
BILIRUB SERPL-MCNC: 0.5 MG/DL (ref 0.1–1.5)
BUN SERPL-MCNC: 9 MG/DL (ref 8–22)
BUN SERPL-MCNC: 9 MG/DL (ref 8–22)
CALCIUM ALBUM COR SERPL-MCNC: 8.7 MG/DL (ref 8.5–10.5)
CALCIUM SERPL-MCNC: 7.3 MG/DL (ref 8.5–10.5)
CALCIUM SERPL-MCNC: 7.3 MG/DL (ref 8.5–10.5)
CHLORIDE SERPL-SCNC: 101 MMOL/L (ref 96–112)
CHLORIDE SERPL-SCNC: 99 MMOL/L (ref 96–112)
CO2 SERPL-SCNC: 27 MMOL/L (ref 20–33)
CO2 SERPL-SCNC: 27 MMOL/L (ref 20–33)
CREAT SERPL-MCNC: 0.35 MG/DL (ref 0.5–1.4)
CREAT SERPL-MCNC: 0.42 MG/DL (ref 0.5–1.4)
EOSINOPHIL # BLD AUTO: 0 K/UL (ref 0–0.51)
EOSINOPHIL NFR BLD: 0 % (ref 0–6.9)
ERYTHROCYTE [DISTWIDTH] IN BLOOD BY AUTOMATED COUNT: 46.9 FL (ref 35.9–50)
GFR SERPLBLD CREATININE-BSD FMLA CKD-EPI: 154 ML/MIN/1.73 M 2
GFR SERPLBLD CREATININE-BSD FMLA CKD-EPI: 163 ML/MIN/1.73 M 2
GLOBULIN SER CALC-MCNC: 1.4 G/DL (ref 1.9–3.5)
GLUCOSE SERPL-MCNC: 86 MG/DL (ref 65–99)
GLUCOSE SERPL-MCNC: 93 MG/DL (ref 65–99)
HCT VFR BLD AUTO: 23.6 % (ref 42–52)
HGB BLD-MCNC: 7.9 G/DL (ref 14–18)
LYMPHOCYTES # BLD AUTO: 0.12 K/UL (ref 1–4.8)
LYMPHOCYTES NFR BLD: 2.7 % (ref 22–41)
MANUAL DIFF BLD: NORMAL
MCH RBC QN AUTO: 29.2 PG (ref 27–33)
MCHC RBC AUTO-ENTMCNC: 33.5 G/DL (ref 32.3–36.5)
MCV RBC AUTO: 87.1 FL (ref 81.4–97.8)
METAMYELOCYTES NFR BLD MANUAL: 0.9 %
MICROCYTES BLD QL SMEAR: ABNORMAL
MONOCYTES # BLD AUTO: 0 K/UL (ref 0–0.85)
MONOCYTES NFR BLD AUTO: 0 % (ref 0–13.4)
MORPHOLOGY BLD-IMP: NORMAL
NEUTROPHILS # BLD AUTO: 4.24 K/UL (ref 1.82–7.42)
NEUTROPHILS NFR BLD: 96.4 % (ref 44–72)
NRBC # BLD AUTO: 0.15 K/UL
NRBC BLD-RTO: 3.4 /100 WBC (ref 0–0.2)
PLATELET # BLD AUTO: 108 K/UL (ref 164–446)
PLATELET BLD QL SMEAR: NORMAL
PMV BLD AUTO: 9.6 FL (ref 9–12.9)
POIKILOCYTOSIS BLD QL SMEAR: NORMAL
POTASSIUM SERPL-SCNC: 3.6 MMOL/L (ref 3.6–5.5)
POTASSIUM SERPL-SCNC: 3.8 MMOL/L (ref 3.6–5.5)
PREALB SERPL-MCNC: 16.6 MG/DL (ref 18–38)
PROT SERPL-MCNC: 3.6 G/DL (ref 6–8.2)
RBC # BLD AUTO: 2.71 M/UL (ref 4.7–6.1)
RBC BLD AUTO: PRESENT
SODIUM SERPL-SCNC: 133 MMOL/L (ref 135–145)
SODIUM SERPL-SCNC: 134 MMOL/L (ref 135–145)
STOMATOCYTES BLD QL SMEAR: NORMAL
WBC # BLD AUTO: 4.4 K/UL (ref 4.8–10.8)

## 2025-04-07 PROCEDURE — 84134 ASSAY OF PREALBUMIN: CPT

## 2025-04-07 PROCEDURE — 700102 HCHG RX REV CODE 250 W/ 637 OVERRIDE(OP): Performed by: PEDIATRICS

## 2025-04-07 PROCEDURE — 700105 HCHG RX REV CODE 258: Performed by: PEDIATRICS

## 2025-04-07 PROCEDURE — 80048 BASIC METABOLIC PNL TOTAL CA: CPT

## 2025-04-07 PROCEDURE — 700111 HCHG RX REV CODE 636 W/ 250 OVERRIDE (IP): Performed by: PEDIATRICS

## 2025-04-07 PROCEDURE — 770004 HCHG ROOM/CARE - ONCOLOGY PRIVATE *

## 2025-04-07 PROCEDURE — A9270 NON-COVERED ITEM OR SERVICE: HCPCS | Performed by: PEDIATRICS

## 2025-04-07 PROCEDURE — 80053 COMPREHEN METABOLIC PANEL: CPT

## 2025-04-07 PROCEDURE — 99233 SBSQ HOSP IP/OBS HIGH 50: CPT | Performed by: PEDIATRICS

## 2025-04-07 PROCEDURE — 85007 BL SMEAR W/DIFF WBC COUNT: CPT

## 2025-04-07 PROCEDURE — 85027 COMPLETE CBC AUTOMATED: CPT

## 2025-04-07 RX ORDER — PANTOPRAZOLE SODIUM 40 MG/10ML
40 INJECTION, POWDER, LYOPHILIZED, FOR SOLUTION INTRAVENOUS DAILY
Status: DISCONTINUED | OUTPATIENT
Start: 2025-04-07 | End: 2025-04-09

## 2025-04-07 RX ADMIN — PREDNISONE 60 MG: 50 TABLET ORAL at 21:03

## 2025-04-07 RX ADMIN — ONDANSETRON 8 MG: 2 INJECTION INTRAMUSCULAR; INTRAVENOUS at 21:10

## 2025-04-07 RX ADMIN — ANTACID TABLETS 1500 MG: 500 TABLET, CHEWABLE ORAL at 08:29

## 2025-04-07 RX ADMIN — LORAZEPAM 1 MG: 2 INJECTION INTRAMUSCULAR; INTRAVENOUS at 05:22

## 2025-04-07 RX ADMIN — ACETAMINOPHEN 650 MG: 325 TABLET ORAL at 10:11

## 2025-04-07 RX ADMIN — CHLORHEXIDINE GLUCONATE, 0.12% ORAL RINSE 15 ML: 1.2 SOLUTION DENTAL at 08:29

## 2025-04-07 RX ADMIN — HYDROCORTISONE: 1 CREAM TOPICAL at 16:43

## 2025-04-07 RX ADMIN — LORAZEPAM 1 MG: 2 INJECTION INTRAMUSCULAR; INTRAVENOUS at 12:00

## 2025-04-07 RX ADMIN — HYDROCORTISONE: 1 CREAM TOPICAL at 05:22

## 2025-04-07 RX ADMIN — LORAZEPAM 1 MG: 2 INJECTION INTRAMUSCULAR; INTRAVENOUS at 18:16

## 2025-04-07 RX ADMIN — SODIUM CHLORIDE: 9 INJECTION, SOLUTION INTRAVENOUS at 21:07

## 2025-04-07 RX ADMIN — ANTACID TABLETS 1500 MG: 500 TABLET, CHEWABLE ORAL at 16:42

## 2025-04-07 RX ADMIN — ANTACID TABLETS 1500 MG: 500 TABLET, CHEWABLE ORAL at 11:47

## 2025-04-07 RX ADMIN — DASATINIB 70 MG: 70 TABLET ORAL at 16:42

## 2025-04-07 RX ADMIN — PANTOPRAZOLE SODIUM 40 MG: 40 INJECTION, POWDER, FOR SOLUTION INTRAVENOUS at 14:38

## 2025-04-07 RX ADMIN — DASATINIB 70 MG: 70 TABLET ORAL at 05:22

## 2025-04-07 RX ADMIN — CHLORHEXIDINE GLUCONATE, 0.12% ORAL RINSE 15 ML: 1.2 SOLUTION DENTAL at 21:04

## 2025-04-07 RX ADMIN — SODIUM CHLORIDE: 9 INJECTION, SOLUTION INTRAVENOUS at 02:16

## 2025-04-07 RX ADMIN — PREDNISONE 60 MG: 50 TABLET ORAL at 11:47

## 2025-04-07 RX ADMIN — Medication 1000 UNITS: at 05:22

## 2025-04-07 RX ADMIN — CHLORHEXIDINE GLUCONATE, 0.12% ORAL RINSE 15 ML: 1.2 SOLUTION DENTAL at 16:42

## 2025-04-07 RX ADMIN — Medication 1 APPLICATOR: at 16:42

## 2025-04-07 RX ADMIN — Medication 1 APPLICATOR: at 05:22

## 2025-04-07 ASSESSMENT — PAIN DESCRIPTION - PAIN TYPE
TYPE: ACUTE PAIN

## 2025-04-07 NOTE — CARE PLAN
Problem: Knowledge Deficit - Standard  Goal: Patient and family/care givers will demonstrate understanding of plan of care, disease process/condition, diagnostic tests and medications  Outcome: Progressing     Problem: Pain - Standard  Goal: Alleviation of pain or a reduction in pain to the patient’s comfort goal  Outcome: Progressing     The patient is Watcher - Medium risk of patient condition declining or worsening    Shift Goals  Clinical Goals: Pain control  Patient Goals: Pain control, rest  Family Goals: Not present    Progress made toward(s) clinical / shift goals:  Pt updated on POC    Patient is not progressing towards the following goals:

## 2025-04-07 NOTE — DIETARY
Nutrition Services: Follow-up for PO Intake   Day 16 of admit. Tomas Jean-Baptiste is a 24 y.o. male with admitting DX of Acute lymphoblastic leukemia (ALL) in relapse ([facility]) [C91.02]    Objective:  No new weights  Last BM 4/7  Fluid accumulation: 1+ BLE edema  Skin/wounds: No pressure injuries documented    Subjective:  Per pediatric hematology oncology note 4/6, pt remains with appetite below baseline. Per flowsheets, only 1 meal documented since last assessment with 0% intake. Will continue to send Ensure daily.     Current diet order:   Regular diet  Ensure Plus High Protein (provides 350 calories, 20 g protein per 8 fl oz) daily     Malnutrition risk: Unable to fully assess for malnutrition at this time      Nutrition Dx: Inadequate Oral Intake related to decreased appetite as evidenced by pt intakes <50% of meals.        Nutrition Dx Status: Ongoing    Problem: Nutritional:  Goal: Achieve adequate nutritional intake  Description: Patient will consume >50% of meals  Outcome: Not Met      Plan/ Recommendations:      Encourage intake of meals, goal for >50%% consumption from meals and/or supplements  Continue Ensure plus high protein daily   Document intake of all meals as % taken in ADL's to provide interdisciplinary communication across all shifts.   Monitor weight.  Nutrition rep available to see patient for ongoing meal and snack preferences.     RD following

## 2025-04-07 NOTE — CARE PLAN
The patient is Watcher - Medium risk of patient condition declining or worsening    Shift Goals  Clinical Goals: Pain control  Patient Goals: Pain control, rest  Family Goals: Not present    Progress made toward(s) clinical / shift goals:       Problem: Knowledge Deficit - Standard  Goal: Patient and family/care givers will demonstrate understanding of plan of care, disease process/condition, diagnostic tests and medications  Outcome: Progressing  Note: A/Ox4, patient able to understand plan of care, all questions answered at this time.      Problem: Pain - Standard  Goal: Alleviation of pain or a reduction in pain to the patient’s comfort goal  Outcome: Progressing     Problem: Fall Risk  Goal: Patient will remain free from falls  Outcome: Progressing  Note: Patient remained free from falls throughout shift. Bed locked and in lowest position, call light and belongings within reach. Room free from clutter. Patient calls appropriately.        Patient is not progressing towards the following goals:

## 2025-04-07 NOTE — DISCHARGE PLANNING
Case Management Discharge Planning    Admission Date: 3/22/2025  GMLOS: 3.8  ALOS: 16    6-Clicks ADL Score: 24  6-Clicks Mobility Score: 24      Anticipated Discharge Dispo: Discharge Disposition: Discharged to home/self care (01)    DME Needed: No    Action(s) Taken: RNCM completed chart review, patient remains inpatient for pain management.     Escalations Completed: None    Medically Clear: No    Next Steps: pending pain control    Barriers to Discharge: Medical clearance    Is the patient up for discharge tomorrow: No

## 2025-04-08 PROBLEM — I95.9 HYPOTENSION: Status: ACTIVE | Noted: 2025-04-08

## 2025-04-08 LAB
ABO GROUP BLD: NORMAL
ACUTE LEUKEMIA MARKERS SPEC-IMP: NORMAL
ANION GAP SERPL CALC-SCNC: 7 MMOL/L (ref 7–16)
BARCODED ABORH UBTYP: 5100
BARCODED PRD CODE UBPRD: NORMAL
BARCODED UNIT NUM UBUNT: NORMAL
BASOPHILS # BLD AUTO: 0 % (ref 0–1.8)
BASOPHILS # BLD: 0 K/UL (ref 0–0.12)
BLD GP AB SCN SERPL QL: NORMAL
BUN SERPL-MCNC: 6 MG/DL (ref 8–22)
CALCIUM SERPL-MCNC: 7.3 MG/DL (ref 8.5–10.5)
CHLORIDE SERPL-SCNC: 104 MMOL/L (ref 96–112)
CO2 SERPL-SCNC: 24 MMOL/L (ref 20–33)
COMPONENT R 8504R: NORMAL
CREAT SERPL-MCNC: 0.37 MG/DL (ref 0.5–1.4)
EOSINOPHIL # BLD AUTO: 0 K/UL (ref 0–0.51)
EOSINOPHIL NFR BLD: 0 % (ref 0–6.9)
ERYTHROCYTE [DISTWIDTH] IN BLOOD BY AUTOMATED COUNT: 46.4 FL (ref 35.9–50)
EVENTS COUNTED SPEC: 10 MARKERS
GFR SERPLBLD CREATININE-BSD FMLA CKD-EPI: 160 ML/MIN/1.73 M 2
GLUCOSE SERPL-MCNC: 200 MG/DL (ref 65–99)
HCT VFR BLD AUTO: 22.6 % (ref 42–52)
HGB BLD-MCNC: 7.5 G/DL (ref 14–18)
IMM GRANULOCYTES # BLD AUTO: 0.05 K/UL (ref 0–0.11)
IMM GRANULOCYTES NFR BLD AUTO: 1.5 % (ref 0–0.9)
LYMPHOCYTES # BLD AUTO: 0.21 K/UL (ref 1–4.8)
LYMPHOCYTES NFR BLD: 6.2 % (ref 22–41)
MCH RBC QN AUTO: 29.1 PG (ref 27–33)
MCHC RBC AUTO-ENTMCNC: 33.2 G/DL (ref 32.3–36.5)
MCV RBC AUTO: 87.6 FL (ref 81.4–97.8)
MONOCYTES # BLD AUTO: 0.05 K/UL (ref 0–0.85)
MONOCYTES NFR BLD AUTO: 1.5 % (ref 0–13.4)
NEUTROPHILS # BLD AUTO: 3.06 K/UL (ref 1.82–7.42)
NEUTROPHILS NFR BLD: 90.8 % (ref 44–72)
NRBC # BLD AUTO: 0.05 K/UL
NRBC BLD-RTO: 1.5 /100 WBC (ref 0–0.2)
PLATELET # BLD AUTO: 126 K/UL (ref 164–446)
PMV BLD AUTO: 10.8 FL (ref 9–12.9)
POTASSIUM SERPL-SCNC: 4.1 MMOL/L (ref 3.6–5.5)
PRODUCT TYPE UPROD: NORMAL
RBC # BLD AUTO: 2.58 M/UL (ref 4.7–6.1)
RH BLD: NORMAL
SODIUM SERPL-SCNC: 135 MMOL/L (ref 135–145)
UNIT STATUS USTAT: NORMAL
WBC # BLD AUTO: 3.4 K/UL (ref 4.8–10.8)

## 2025-04-08 PROCEDURE — 86945 BLOOD PRODUCT/IRRADIATION: CPT

## 2025-04-08 PROCEDURE — 700111 HCHG RX REV CODE 636 W/ 250 OVERRIDE (IP): Performed by: PEDIATRICS

## 2025-04-08 PROCEDURE — 86901 BLOOD TYPING SEROLOGIC RH(D): CPT

## 2025-04-08 PROCEDURE — 99233 SBSQ HOSP IP/OBS HIGH 50: CPT | Performed by: PEDIATRICS

## 2025-04-08 PROCEDURE — 36430 TRANSFUSION BLD/BLD COMPNT: CPT

## 2025-04-08 PROCEDURE — A9270 NON-COVERED ITEM OR SERVICE: HCPCS | Performed by: PEDIATRICS

## 2025-04-08 PROCEDURE — 700102 HCHG RX REV CODE 250 W/ 637 OVERRIDE(OP): Performed by: PEDIATRICS

## 2025-04-08 PROCEDURE — 770004 HCHG ROOM/CARE - ONCOLOGY PRIVATE *

## 2025-04-08 PROCEDURE — P9016 RBC LEUKOCYTES REDUCED: HCPCS

## 2025-04-08 PROCEDURE — 80048 BASIC METABOLIC PNL TOTAL CA: CPT

## 2025-04-08 PROCEDURE — 86923 COMPATIBILITY TEST ELECTRIC: CPT

## 2025-04-08 PROCEDURE — 86900 BLOOD TYPING SEROLOGIC ABO: CPT

## 2025-04-08 PROCEDURE — 700105 HCHG RX REV CODE 258: Performed by: PEDIATRICS

## 2025-04-08 PROCEDURE — 85025 COMPLETE CBC W/AUTO DIFF WBC: CPT

## 2025-04-08 PROCEDURE — 86850 RBC ANTIBODY SCREEN: CPT

## 2025-04-08 RX ORDER — SODIUM CHLORIDE 9 MG/ML
1000 INJECTION, SOLUTION INTRAVENOUS ONCE
Status: COMPLETED | OUTPATIENT
Start: 2025-04-08 | End: 2025-04-08

## 2025-04-08 RX ORDER — PREDNISONE 20 MG/1
40 TABLET ORAL 2 TIMES DAILY
Status: DISCONTINUED | OUTPATIENT
Start: 2025-04-09 | End: 2025-04-09 | Stop reason: HOSPADM

## 2025-04-08 RX ADMIN — ANTACID TABLETS 1500 MG: 500 TABLET, CHEWABLE ORAL at 17:27

## 2025-04-08 RX ADMIN — Medication 1 APPLICATOR: at 17:27

## 2025-04-08 RX ADMIN — DASATINIB 70 MG: 70 TABLET ORAL at 05:08

## 2025-04-08 RX ADMIN — LORAZEPAM 1 MG: 2 INJECTION INTRAMUSCULAR; INTRAVENOUS at 03:05

## 2025-04-08 RX ADMIN — SODIUM CHLORIDE 1000 ML: 9 INJECTION, SOLUTION INTRAVENOUS at 07:15

## 2025-04-08 RX ADMIN — DASATINIB 70 MG: 70 TABLET ORAL at 17:28

## 2025-04-08 RX ADMIN — Medication 1000 UNITS: at 05:08

## 2025-04-08 RX ADMIN — PREDNISONE 60 MG: 50 TABLET ORAL at 05:08

## 2025-04-08 RX ADMIN — PANTOPRAZOLE SODIUM 40 MG: 40 INJECTION, POWDER, FOR SOLUTION INTRAVENOUS at 05:08

## 2025-04-08 RX ADMIN — Medication 1 APPLICATOR: at 07:13

## 2025-04-08 RX ADMIN — HYDROCORTISONE: 1 CREAM TOPICAL at 17:36

## 2025-04-08 RX ADMIN — CHLORHEXIDINE GLUCONATE, 0.12% ORAL RINSE 15 ML: 1.2 SOLUTION DENTAL at 08:27

## 2025-04-08 RX ADMIN — ANTACID TABLETS 1500 MG: 500 TABLET, CHEWABLE ORAL at 10:30

## 2025-04-08 RX ADMIN — PREDNISONE 60 MG: 50 TABLET ORAL at 17:27

## 2025-04-08 RX ADMIN — ANTACID TABLETS 1500 MG: 500 TABLET, CHEWABLE ORAL at 08:27

## 2025-04-08 RX ADMIN — CHLORHEXIDINE GLUCONATE, 0.12% ORAL RINSE 15 ML: 1.2 SOLUTION DENTAL at 21:26

## 2025-04-08 RX ADMIN — HYDROCORTISONE: 1 CREAM TOPICAL at 05:08

## 2025-04-08 RX ADMIN — LORAZEPAM 1 MG: 2 INJECTION INTRAMUSCULAR; INTRAVENOUS at 17:32

## 2025-04-08 RX ADMIN — CHLORHEXIDINE GLUCONATE, 0.12% ORAL RINSE 15 ML: 1.2 SOLUTION DENTAL at 17:27

## 2025-04-08 RX ADMIN — SCOPOLAMINE 1 PATCH: 1.5 PATCH, EXTENDED RELEASE TRANSDERMAL at 10:29

## 2025-04-08 ASSESSMENT — PAIN DESCRIPTION - PAIN TYPE
TYPE: ACUTE PAIN
TYPE: ACUTE PAIN

## 2025-04-08 NOTE — PROGRESS NOTES
Pediatric Hematology/Oncology  Daily Progress Note      Patient Name:  Tomas Jean-Baptiste  : 2000  MRN: [id number]    Location of Service:  [facility]  Date of Service: 2025  Time: 9:00 AM    Hospital Day: 17    Protocol / Treatment Plan: S/P Individualized Re-Induction chemotherapy, 3 Drug + Tyrosine Kinase Inhibitor.  No with monotherapy TKI.    SUBJECTIVE:     Afebrile overnight. Vital signs stable.  Complaining of significant pain this morning.  Primarily in back and hips as well as in abdomen (mid/upper).  Reports that pain started with discontinuation of steroids.  No complaints of any headaches, changes in vision or neurologic status changes.  No complaints of any nausea, vomiting, diarrhea or constipation.  He reports that he is stooling normally now.  Minimal rectal pain.  No complaints of any skin changes, rashes or signs of infection.  No easy bruising or bleeding. No other concerns or complaints.      Review of Systems:     Constitutional: Afebrile, not feeling well.  Having considerable pain/aches.   HENT: Negative for auditory changes or ear pain, no nosebleeds, no congestion, no rhinorrhea, no sore throat.  No mouth sores.   Eyes: Negative for visual changes.  Respiratory: Negative for shortness of breath or cough.  Cardiovascular: Negative.  Gastrointestinal: Negative for nausea, vomiting, abdominal pain, diarrhea, constipation. No complaints of blood stool.  Improved pain with defecation.  Genitourinary: Negative..  Musculoskeletal: Lower back pain and leg pain.  Skin: Negative for rash or skin infection.  Neurological: Negative for numbness, tingling, sensory changes, weakness or headaches.    Endo/Heme/Allergies: No bruising/bleeding easily.    Psychiatric/Behavioral: Depressed mood.     OBJECTIVE:     Max Temp: Temp (24hrs), Av.5 °C (97.7 °F), Min:36.1 °C (97 °F), Max:36.9 °C (98.5 °F)    Vitals: /62   Pulse 93   Temp 36.9  "°C (98.5 °F) (Oral)   Resp 16   Ht 1.651 m (5' 5\")   Wt 72.3 kg (159 lb 6.3 oz)   SpO2 92%   BMI 26.52 kg/m²     I/O: No intake or output data in the 24 hours ending 04/07/25 2233      Labs:     Latest Reference Range & Units 04/07/25 00:28   WBC 4.8 - 10.8 K/uL 4.4 (L)   RBC 4.70 - 6.10 M/uL 2.71 (L)   Hemoglobin 14.0 - 18.0 g/dL 7.9 (L)   Hematocrit 42.0 - 52.0 % 23.6 (L)   MCV 81.4 - 97.8 fL 87.1   MCH 27.0 - 33.0 pg 29.2   MCHC 32.3 - 36.5 g/dL 33.5   RDW 35.9 - 50.0 fL 46.9   Platelet Count 164 - 446 K/uL 108 (L)   MPV 9.0 - 12.9 fL 9.6   Neutrophils-Polys 44.00 - 72.00 % 96.40 (H)   Neutrophils (Absolute) 1.82 - 7.42 K/uL 4.24   Lymphocytes 22.00 - 41.00 % 2.70 (L)   Lymphs (Absolute) 1.00 - 4.80 K/uL 0.12 (L)   Monocytes 0.00 - 13.40 % 0.00   Monos (Absolute) 0.00 - 0.85 K/uL 0.00   Eosinophils 0.00 - 6.90 % 0.00   Eos (Absolute) 0.00 - 0.51 K/uL 0.00   Basophils 0.00 - 1.80 % 0.00   Baso (Absolute) 0.00 - 0.12 K/uL 0.00   Metamyelocytes % 0.90   Nucleated RBC 0.00 - 0.20 /100 WBC 3.40 (H)   NRBC (Absolute) K/uL 0.15   Plt Estimation  Decreased   RBC Morphology  Present   Anisocytosis  1+   Microcytosis  1+   Poikilocytosis  1+   Stomatocytes  1+   Peripheral Smear Review  see below   Manual Diff Status  PERFORMED   Sodium 135 - 145 mmol/L 133 (L)   Potassium 3.6 - 5.5 mmol/L 3.8   Chloride 96 - 112 mmol/L 99   Co2 20 - 33 mmol/L 27   Anion Gap 7.0 - 16.0  7.0   Glucose 65 - 99 mg/dL 93   Bun 8 - 22 mg/dL 9   Creatinine 0.50 - 1.40 mg/dL 0.42 (L)   GFR (CKD-EPI) >60 mL/min/1.73 m 2 154   Calcium 8.5 - 10.5 mg/dL 7.3 (L)   Correct Calcium 8.5 - 10.5 mg/dL 8.7   AST(SGOT) 12 - 45 U/L 112 (H)   ALT(SGPT) 2 - 50 U/L 248 (H)   Alkaline Phosphatase 30 - 99 U/L 158 (H)   Total Bilirubin 0.1 - 1.5 mg/dL 0.5   Albumin 3.2 - 4.9 g/dL 2.2 (L)   Total Protein 6.0 - 8.2 g/dL 3.6 (L)   Globulin 1.9 - 3.5 g/dL 1.4 (L)   A-G Ratio g/dL 1.6   (L): Data is abnormally low  (H): Data is abnormally high    Physical " Exam:    Constitutional: Ill appearing, non-toxic.  Miserable due to body aches.  HENT: Normocephalic and atraumatic.  No rhinorrhea. Oropharynx is clear and moist.   Eyes: Conjunctivae are normal. EOMI. Non-icteric. Pupils equal and round.  Neck: Normal range of motion of neck, no adenopathy.    Cardiovascular: Normal rate, regular rhythm.  No murmur. DP/radial pulses 2+, cap refill < 2 sec  Pulmonary/Chest: Effort normal. No respiratory distress. Symmetric expansion.  No crackles or wheezes. Abdomen: Soft. Bowel sounds are normal. No distension and no mass. There is no hepatosplenomegaly.   Genitourinary:  Deferred  Musculoskeletal: Normal range of motion of lower and upper extremities bilaterally.   Neurological: Alert and oriented to person and place. Exhibits normal muscle tone bilaterally in upper and lower extremities. Gait not assessed. Coordination normal.    Skin: Skin is warm, dry and pink.  No rash or evidence of skin infection.   Psychiatric: Depressed mood.    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 Has completed therapy for Rothia.    1) Corticosteroid Induced Myopathy:              - Continues to complain of back and leg pain              - Tylenol PRN available for pain              - Heat packs PRN              - OK to spot dose Motrin. Lidocaine patch can be used if pain focal.              - Using Oxycodone 5-10 mg PRN for pain.   - Given 100 mg IV hydrocortisone yesterday  - Restart prednisone 60 mg PO BID - will wean              - Will continue to monitor      2) Constipation/Rectal Pain/Pain with Defecation/Hematochezia: RESOLVING  - Pain was resolved on 4/30/2025, then recurred. No longer as severe as upon admission, but still recurs when passing stool. This has been getting progressively better over the past few days.   - Morphine 2 mg IV every 3  hrs PRN for moderate-severe pain not controlled by oxycodone.   - Oxycodone 5-10 mg PO every 4 hrs PRN for moderate-severe pain  - Senna 2 tablets BID, titrate to effect  - Preparation H, apply externally   - Sitz bath PRN  - MR rectum not available as an inpatient. MRI pelvis demonstrated mild diffuse increase signal throughout the pelvic muscles possibly related to inflammation. No obvious sinus tract or fluid collection in the anus or ischial anal fossa.  - Will continue to monitor clinically.      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 Likely plan for HSCT, search for MUD ongoing. After discussing multiple options, they decided on a 3-Drug Re-Induction (VCR/PRED/PEG-ASP) +Imatinib followed by blinatumomab. 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 Met virtually with Dr. Adrian, [facility] 3/20/2025    - Has had discussions with both bone marrow transplant as well as some therapeutics (CAR-T) at [facility], will likely pursue two-stage CAR-T trial at [facility] as first line following re-induction    - End of Reinduction evaluations PENDING     4) S/p Individualized Salvage Therapy, 3-Drug Re-Induction:  ** Methotrexate 15 mg IT x 1 on Days 1 (COMPLETE, see separate procedure note)  ** Vincristine 2 mg IV x 1 on Days 1 (COMPLETE), 8 (COMPLETE), 15 (COMPLETE) and 22 (COMPLETE)  ** Prednisone 30 mg/m2/dose = 60 mg PO BID x 28 days  (COMPLETE)  ** PEG-Aspargase 2000 IU/m2 x 1 on Day 4 in OPIC (COMPLETE)   ** Imatinib 400 mg PO QAM and 250 mg PO QHS (started 3/6/2025, given resistence, discontinued)  ** Continue Dasatinib 70 mg PO BID, started 3/22/2025  ** On Pepcid BID while on corticosteroids. Pharmacist discussed with primary oncologist decrease in efficacy of Dasatinib. Switched to Tums and to be  by at least 2 hrs from Dasatinib administration.      - S/P End of Reinduction evaluations with bone marrow aspirate 2025 in OR.  Sent for MRD analysis as well as BCR-ABL1 RT-PCR - PENDING  - S/P lumbar puncture with methotrexate 15 mg IT x 1 on 2025 in OR (COMPLETE, see separate procedure note)      5) Pancytopenia Secondary to Leukemia and Therapy:  - WBC: 4400/microliters, Hemoglobin: 7.9 gm/dL, Platelets: 108,000/microliters  - ANC recovered and at 4240/microliters, A/microliters  - Transfuse irradiated PRBC for Hgb<7 g/dL or symptomatic.   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 No clinical lesions  - Chlorhexidine mouthwash 4 times daily    7) History  of Rothia mucilaginosa Bacteremia:              - Blood cultures obtained on presentation 3/22/2025 from [facility] (both lumens) with Rothia mucilaginosa               - 3/25/25 Blood cx negative to date              - Was placed on empiric cefepime and Flagyl upon admission               - Vancomycin started for Rothia on 3/23/2025              - Completed therapy and discontinued Cefepime, Flagyl and Vancomycin on 4/4/2025     8) FEN/GI:  - Regular diet  - IVF decreased to 50 ml/hr  - CMP on Mon/Thurs, BMP other days    9) At Risk For Malnutrition Secondary To Poor PO Intake:             - Albumin low at 2.2 gm/dL             - Will get prealbumin level tomorrow             - Consider dietary consult, starting appetite stimulant, therapy     10) At Risk For Nausea and Vomiting Secondary To Therapy             - Zofran changed to PRN              - Scopolamine patch every 3 days             - Ativan IV PRN for breakthrough N/V available     11) Hyperglycemia, Steroid-Induced: RESOLVED    12) Transaminitis Secondary To Therapy: (WORSENING)  - AST: 112 U/L, ALT: 248 U/L 4  - Bilirubin total 0.5 mg/dL   - Will continue to monitor  - Monday & Thursday hepatic labs    13) At Risk For Hypovitaminosis D             - Continue Vitamin D3 supplement daily     14) Central Access:  - Double-lumen CVL placed 2/28/2025 by surgery  - Saline flush per protocol     15) Psychosocial:  - Dr. Ortega following     16) Social:              - Referred to Social Work and financial navigator     Disposition: Continue with pain management     Pepe Faye MD  Pediatric Hematology / Oncology  [facility]   Direct Ph. [phone] / Cell. [phone]  [email or web address]

## 2025-04-08 NOTE — CARE PLAN
Problem: Knowledge Deficit - Standard  Goal: Patient and family/care givers will demonstrate understanding of plan of care, disease process/condition, diagnostic tests and medications  Outcome: Progressing     Problem: Psychosocial  Goal: Patient's level of anxiety will decrease  Outcome: Progressing     The patient is Watcher - Medium risk of patient condition declining or worsening    Shift Goals  Clinical Goals: pain control, mobility, monitor anxiety  Patient Goals: pain control, rest  Family Goals: None Present    Progress made toward(s) clinical / shift goals:  Pt updated on POC    Patient is not progressing towards the following goals:

## 2025-04-08 NOTE — CARE PLAN
The patient is Watcher - Medium risk of patient condition declining or worsening    Shift Goals  Clinical Goals: pain control, mobility, monitor anxiety  Patient Goals: pain control, rest  Family Goals: None Present    Progress made toward(s) clinical / shift goals:     Problem: Knowledge Deficit - Standard  Goal: Patient and family/care givers will demonstrate understanding of plan of care, disease process/condition, diagnostic tests and medications  Outcome: Progressing     Problem: Pain - Standard  Goal: Alleviation of pain or a reduction in pain to the patient’s comfort goal  Outcome: Progressing     Problem: Fall Risk  Goal: Patient will remain free from falls  Outcome: Progressing

## 2025-04-09 ENCOUNTER — PHARMACY VISIT (OUTPATIENT)
Dept: PHARMACY | Facility: MEDICAL CENTER | Age: 25
End: 2025-04-09
Payer: COMMERCIAL

## 2025-04-09 VITALS
BODY MASS INDEX: 26.56 KG/M2 | HEIGHT: 65 IN | SYSTOLIC BLOOD PRESSURE: 123 MMHG | DIASTOLIC BLOOD PRESSURE: 76 MMHG | HEART RATE: 98 BPM | OXYGEN SATURATION: 99 % | TEMPERATURE: 98.1 F | WEIGHT: 159.39 LBS | RESPIRATION RATE: 16 BRPM

## 2025-04-09 PROBLEM — R78.81 BACTEREMIA: Status: RESOLVED | Noted: 2025-03-23 | Resolved: 2025-04-09

## 2025-04-09 PROBLEM — I95.9 HYPOTENSION: Status: RESOLVED | Noted: 2025-04-08 | Resolved: 2025-04-09

## 2025-04-09 LAB
ANION GAP SERPL CALC-SCNC: 11 MMOL/L (ref 7–16)
BASOPHILS # BLD AUTO: 0 % (ref 0–1.8)
BASOPHILS # BLD: 0 K/UL (ref 0–0.12)
BUN SERPL-MCNC: 5 MG/DL (ref 8–22)
CALCIUM SERPL-MCNC: 8.3 MG/DL (ref 8.5–10.5)
CHLORIDE SERPL-SCNC: 101 MMOL/L (ref 96–112)
CO2 SERPL-SCNC: 24 MMOL/L (ref 20–33)
CREAT SERPL-MCNC: 0.41 MG/DL (ref 0.5–1.4)
EOSINOPHIL # BLD AUTO: 0 K/UL (ref 0–0.51)
EOSINOPHIL NFR BLD: 0 % (ref 0–6.9)
ERYTHROCYTE [DISTWIDTH] IN BLOOD BY AUTOMATED COUNT: 47.3 FL (ref 35.9–50)
GFR SERPLBLD CREATININE-BSD FMLA CKD-EPI: 156 ML/MIN/1.73 M 2
GLUCOSE SERPL-MCNC: 140 MG/DL (ref 65–99)
HCT VFR BLD AUTO: 23.6 % (ref 42–52)
HGB BLD-MCNC: 8.1 G/DL (ref 14–18)
IMM GRANULOCYTES # BLD AUTO: 0.06 K/UL (ref 0–0.11)
IMM GRANULOCYTES NFR BLD AUTO: 1.8 % (ref 0–0.9)
LYMPHOCYTES # BLD AUTO: 0.2 K/UL (ref 1–4.8)
LYMPHOCYTES NFR BLD: 6.2 % (ref 22–41)
MCH RBC QN AUTO: 30.3 PG (ref 27–33)
MCHC RBC AUTO-ENTMCNC: 34.3 G/DL (ref 32.3–36.5)
MCV RBC AUTO: 88.4 FL (ref 81.4–97.8)
MONOCYTES # BLD AUTO: 0.09 K/UL (ref 0–0.85)
MONOCYTES NFR BLD AUTO: 2.8 % (ref 0–13.4)
NEUTROPHILS # BLD AUTO: 2.9 K/UL (ref 1.82–7.42)
NEUTROPHILS NFR BLD: 89.2 % (ref 44–72)
NRBC # BLD AUTO: 0.05 K/UL
NRBC BLD-RTO: 1.5 /100 WBC (ref 0–0.2)
PLATELET # BLD AUTO: 137 K/UL (ref 164–446)
PMV BLD AUTO: 10.3 FL (ref 9–12.9)
POTASSIUM SERPL-SCNC: 3.9 MMOL/L (ref 3.6–5.5)
RBC # BLD AUTO: 2.67 M/UL (ref 4.7–6.1)
SODIUM SERPL-SCNC: 136 MMOL/L (ref 135–145)
WBC # BLD AUTO: 3.3 K/UL (ref 4.8–10.8)

## 2025-04-09 PROCEDURE — 700102 HCHG RX REV CODE 250 W/ 637 OVERRIDE(OP): Performed by: PEDIATRICS

## 2025-04-09 PROCEDURE — 99239 HOSP IP/OBS DSCHRG MGMT >30: CPT | Performed by: PEDIATRICS

## 2025-04-09 PROCEDURE — A9270 NON-COVERED ITEM OR SERVICE: HCPCS | Performed by: PEDIATRICS

## 2025-04-09 PROCEDURE — 85025 COMPLETE CBC W/AUTO DIFF WBC: CPT

## 2025-04-09 PROCEDURE — 700111 HCHG RX REV CODE 636 W/ 250 OVERRIDE (IP): Performed by: PEDIATRICS

## 2025-04-09 PROCEDURE — RXMED WILLOW AMBULATORY MEDICATION CHARGE: Performed by: PEDIATRICS

## 2025-04-09 PROCEDURE — 80048 BASIC METABOLIC PNL TOTAL CA: CPT

## 2025-04-09 PROCEDURE — 700105 HCHG RX REV CODE 258: Performed by: PEDIATRICS

## 2025-04-09 RX ORDER — LORAZEPAM 1 MG/1
1 TABLET ORAL NIGHTLY PRN
Qty: 30 TABLET | Refills: 5 | Status: SHIPPED | OUTPATIENT
Start: 2025-04-09 | End: 2025-10-06

## 2025-04-09 RX ORDER — PREDNISONE 10 MG/1
TABLET ORAL
Qty: 13 TABLET | Refills: 0 | Status: SHIPPED | OUTPATIENT
Start: 2025-04-09 | End: 2025-04-13

## 2025-04-09 RX ORDER — OMEPRAZOLE 20 MG/1
20 CAPSULE, DELAYED RELEASE ORAL DAILY
Status: DISCONTINUED | OUTPATIENT
Start: 2025-04-10 | End: 2025-04-09 | Stop reason: HOSPADM

## 2025-04-09 RX ORDER — CYPROHEPTADINE HYDROCHLORIDE 4 MG/1
4 TABLET ORAL 3 TIMES DAILY
Qty: 90 TABLET | Refills: 0 | Status: SHIPPED | OUTPATIENT
Start: 2025-04-09 | End: 2025-05-09

## 2025-04-09 RX ORDER — OXYCODONE HYDROCHLORIDE 5 MG/1
5-10 TABLET ORAL EVERY 4 HOURS PRN
Qty: 30 TABLET | Refills: 0 | Status: SHIPPED | OUTPATIENT
Start: 2025-04-09 | End: 2025-04-14

## 2025-04-09 RX ADMIN — Medication 1 APPLICATOR: at 17:16

## 2025-04-09 RX ADMIN — CHLORHEXIDINE GLUCONATE, 0.12% ORAL RINSE 15 ML: 1.2 SOLUTION DENTAL at 14:02

## 2025-04-09 RX ADMIN — CHLORHEXIDINE GLUCONATE, 0.12% ORAL RINSE 15 ML: 1.2 SOLUTION DENTAL at 17:20

## 2025-04-09 RX ADMIN — Medication 1 APPLICATOR: at 05:10

## 2025-04-09 RX ADMIN — ANTACID TABLETS 1500 MG: 500 TABLET, CHEWABLE ORAL at 17:15

## 2025-04-09 RX ADMIN — PANTOPRAZOLE SODIUM 40 MG: 40 INJECTION, POWDER, FOR SOLUTION INTRAVENOUS at 05:10

## 2025-04-09 RX ADMIN — PREDNISONE 40 MG: 20 TABLET ORAL at 17:15

## 2025-04-09 RX ADMIN — PREDNISONE 40 MG: 20 TABLET ORAL at 05:10

## 2025-04-09 RX ADMIN — LORAZEPAM 1 MG: 2 INJECTION INTRAMUSCULAR; INTRAVENOUS at 16:01

## 2025-04-09 RX ADMIN — CHLORHEXIDINE GLUCONATE, 0.12% ORAL RINSE 15 ML: 1.2 SOLUTION DENTAL at 08:48

## 2025-04-09 RX ADMIN — SODIUM CHLORIDE: 9 INJECTION, SOLUTION INTRAVENOUS at 03:28

## 2025-04-09 RX ADMIN — HYDROCORTISONE: 1 CREAM TOPICAL at 17:15

## 2025-04-09 RX ADMIN — HYDROCORTISONE: 1 CREAM TOPICAL at 05:10

## 2025-04-09 RX ADMIN — DASATINIB 70 MG: 70 TABLET ORAL at 05:10

## 2025-04-09 RX ADMIN — LORAZEPAM 1 MG: 2 INJECTION INTRAMUSCULAR; INTRAVENOUS at 03:28

## 2025-04-09 RX ADMIN — DASATINIB 70 MG: 70 TABLET ORAL at 17:15

## 2025-04-09 RX ADMIN — ANTACID TABLETS 1500 MG: 500 TABLET, CHEWABLE ORAL at 11:36

## 2025-04-09 RX ADMIN — ANTACID TABLETS 1500 MG: 500 TABLET, CHEWABLE ORAL at 08:47

## 2025-04-09 RX ADMIN — Medication 1000 UNITS: at 05:10

## 2025-04-09 ASSESSMENT — PAIN DESCRIPTION - PAIN TYPE: TYPE: ACUTE PAIN

## 2025-04-09 NOTE — PROGRESS NOTES
Pediatric Hematology/Oncology  Daily Progress Note      Patient Name:  Tomas Jean-Baptiste  : 2000  MRN: [id number]    Location of Service:  [facility] - Cancer Nursing Unit  Date of Service: 2025  Time: 9:00 AM    Hospital Day: 18    Protocol / Treatment Plan: S/P Individualized Re-Induction chemotherapy, 3 Drug + Tyrosine Kinase Inhibitor.  No with monotherapy TKI.    SUBJECTIVE:     Afebrile overnight. BP soft this AM when awakening.  Fluid bolus given.  Tomas Roy reports that overall he is improved from yesterday.  He reports that he remains fatigued and tired.  He reports however that his pain has improved from yesterday.  Still with some body aches and abdominal pain.  Stooling remains improved without any blood in stool and without any pain with defecation.  No complaints of nausea or vomiting.  Decreased PO.  No new skin changes or rashes.  No easy bleeding or bruising.  No other concerns or complaints.      Review of Systems:     Constitutional: Afebrile, still with considerable fatigue. Improved pain from yesterday.  .   HENT: Negative for auditory changes or ear pain, no nosebleeds, no congestion, no rhinorrhea, no sore throat.  No mouth sores.   Eyes: Negative for visual changes.  Respiratory: Negative for shortness of breath or cough.  Cardiovascular: Negative.  Gastrointestinal: Negative for nausea, vomiting, abdominal pain, diarrhea, constipation. No complaints of blood stool.  Improved pain with defecation.  Genitourinary: Negative.  Musculoskeletal: Lower back pain and leg pain improved.  Skin: Negative for rash or skin infection.  Neurological: Negative for numbness, tingling, sensory changes, weakness or headaches.    Endo/Heme/Allergies: No bruising/bleeding easily.    Psychiatric/Behavioral: Depressed mood.     OBJECTIVE:     Max Temp: Temp (24hrs), Av.5 °C (97.7 °F), Min:36.1 °C (96.9 °F), Max:36.8 °C (98.2 °F)    Vitals: /67   Pulse 100  "  Temp 36.4 °C (97.6 °F) (Temporal)   Resp 16   Ht 1.651 m (5' 5\")   Wt 72.3 kg (159 lb 6.3 oz)   SpO2 96%   BMI 26.52 kg/m²     I/O:   Intake/Output Summary (Last 24 hours) at 4/8/2025 2130  Last data filed at 4/8/2025 1925  Gross per 24 hour   Intake --   Output 1450 ml   Net -1450 ml     Labs:   Latest Reference Range & Units 04/08/25 00:21 04/08/25 10:35   WBC 4.8 - 10.8 K/uL 3.4 (L)    RBC 4.70 - 6.10 M/uL 2.58 (L)    Hemoglobin 14.0 - 18.0 g/dL 7.5 (L)    Hematocrit 42.0 - 52.0 % 22.6 (L)    MCV 81.4 - 97.8 fL 87.6    MCH 27.0 - 33.0 pg 29.1    MCHC 32.3 - 36.5 g/dL 33.2    RDW 35.9 - 50.0 fL 46.4    Platelet Count 164 - 446 K/uL 126 (L)    MPV 9.0 - 12.9 fL 10.8    Neutrophils-Polys 44.00 - 72.00 % 90.80 (H)    Neutrophils (Absolute) 1.82 - 7.42 K/uL 3.06    Lymphocytes 22.00 - 41.00 % 6.20 (L)    Lymphs (Absolute) 1.00 - 4.80 K/uL 0.21 (L)    Monocytes 0.00 - 13.40 % 1.50    Monos (Absolute) 0.00 - 0.85 K/uL 0.05    Eosinophils 0.00 - 6.90 % 0.00    Eos (Absolute) 0.00 - 0.51 K/uL 0.00    Basophils 0.00 - 1.80 % 0.00    Baso (Absolute) 0.00 - 0.12 K/uL 0.00    Immature Granulocytes 0.00 - 0.90 % 1.50 (H)    Immature Granulocytes (abs) 0.00 - 0.11 K/uL 0.05    Nucleated RBC 0.00 - 0.20 /100 WBC 1.50 (H)    NRBC (Absolute) K/uL 0.05    Sodium 135 - 145 mmol/L  135   Potassium 3.6 - 5.5 mmol/L  4.1   Chloride 96 - 112 mmol/L  104   Co2 20 - 33 mmol/L  24   Anion Gap 7.0 - 16.0   7.0   Glucose 65 - 99 mg/dL  200 (H)   Bun 8 - 22 mg/dL  6 (L)   Creatinine 0.50 - 1.40 mg/dL  0.37 (L)   GFR (CKD-EPI) >60 mL/min/1.73 m 2  160   Calcium 8.5 - 10.5 mg/dL  7.3 (L)   (L): Data is abnormally low  (H): Data is abnormally high  Physical Exam:    Constitutional: Improved slightly.  Still ill appearing.  Not toxic.  HENT: Normocephalic and atraumatic.  No rhinorrhea. Oropharynx is clear and moist.   Eyes: Conjunctivae are normal. EOMI. Non-icteric. Pupils equal and round.  Neck: Normal range of motion of neck, no " adenopathy.    Cardiovascular: Normal rate, regular rhythm.  No murmur. DP/radial pulses 2+, cap refill < 2 sec.  Pulmonary/Chest: Effort normal. No respiratory distress. Symmetric expansion.  No crackles or wheezes. Abdomen: Soft. Bowel sounds are normal. No distension and no mass. There is no hepatosplenomegaly.   Genitourinary:  Deferred  Musculoskeletal: Normal range of motion of lower and upper extremities bilaterally.   Neurological: Alert and oriented to person and place. Exhibits normal muscle tone bilaterally in upper and lower extremities. Gait not assessed. Coordination normal.    Skin: Skin is warm, dry and pink.  No rash or evidence of skin infection.   Psychiatric: Depressed mood.    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 Has completed therapy for Rothia mucilaginosa    1) Corticosteroid Induced Myopathy: (IMPROVED)              - Continues to complain of back and leg pain, albeit improved              - Tylenol PRN available for pain              - Heat packs PRN              - OK to spot dose Motrin. Lidocaine patch can be used if pain focal.              - Using Oxycodone 5-10 mg PRN for pain   - Given 100 mg IV hydrocortisone x1  - Restarted prednisone 60 mg PO BID 4/7/2025 - will wean to 40 mg BID              - Will continue to monitor      2) Constipation/Rectal Pain/Pain with Defecation/Hematochezia: RESOLVING  - Pain was resolved on 4/30/2025, then recurred. No longer as severe as upon admission, but still recurs when passing stool. This has been getting progressively better over the past few days.   - Morphine 2 mg IV every 3 hrs PRN for moderate-severe pain not controlled by oxycodone.   - Oxycodone 5-10 mg PO every 4 hrs PRN for moderate-severe pain  - Senna 2 tablets BID, titrate to effect  - Preparation H, apply externally   - Sitz bath PRN  -  MR rectum not available as an inpatient. MRI pelvis demonstrated mild diffuse increase signal throughout the pelvic muscles possibly related to inflammation. No obvious sinus tract or fluid collection in the anus or ischial anal fossa.  - Will continue to monitor clinically.      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 Likely plan for HSCT, search for MUD ongoing. After discussing multiple options, they decided on a 3-Drug Re-Induction (VCR/PRED/PEG-ASP) +Imatinib followed by blinatumomab. 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 Met virtually with Dr. Adrian, [facility] 3/20/2025    - Has had discussions with both bone marrow transplant as well as some therapeutics (CAR-T) at [facility], will likely pursue  two-stage CAR-T trial at [place] as first line following re-induction    - End of Reinduction evaluations demonstrating phenotypically similar aberrant lymphoblast population of 0.005% (however lacking CD19 or CD22)      4) S/P Individualized Salvage Therapy, 3-Drug Re-Induction:  ** Methotrexate 15 mg IT x 1 on Days 1 (COMPLETE, see separate procedure note)  ** Vincristine 2 mg IV x 1 on Days 1 (COMPLETE), 8 (COMPLETE), 15 (COMPLETE) and 22 (COMPLETE)  ** Prednisone 30 mg/m2/dose = 60 mg PO BID x 28 days  (COMPLETE)  ** PEG-Aspargase 2000 IU/m2 x 1 on Day 4 in OPIC (COMPLETE)   ** Imatinib 400 mg PO QAM and 250 mg PO QHS (started 3/6/2025, given resistence, discontinued)  ** Continue Dasatinib 70 mg PO BID, started 3/22/2025      - S/P End of Reinduction evaluations with bone marrow aspirate 2025 in OR.  Sent for MRD analysis as well as BCR-ABL1 RT-PCR - PENDING  - S/P lumbar puncture with methotrexate 15 mg IT x 1 on 2025 in OR (COMPLETE, see separate procedure note)      5) Pancytopenia Secondary to Leukemia and Therapy:  - WBC: 3400/microliters, Hemoglobin: 7.5 gm/dL, Platelets: 126,000/microliters  - ANC recovered and at 3060/microliters, A/microliters  - Transfuse irradiated PRBC for Hgb<7 g/dL or symptomatic.   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 No clinical lesions  - Chlorhexidine mouthwash 4 times daily    7) History of Rothia mucilaginosa Bacteremia:              - Blood cultures obtained on presentation 3/22/2025 from CVL (both lumens) with Rothia mucilaginosa               - 3/25/25 Blood Cx negative to date              - Was placed on empiric cefepime and Flagyl upon admission               - Vancomycin started for Rothia on 3/23/2025               - Completed therapy and discontinued Cefepime, Flagyl and Vancomycin on 4/4/2025   - Discontinued all antibiotics at this time     8) FEN/GI:  - Regular diet  - IVF decreased to 50 ml/hr  - CMP on Mon/Thurs, BMP other days    9) At Risk For Malnutrition Secondary To Poor PO Intake:             - Albumin low at 2.2 gm/dL             - Prealbumin 16.6 mg/dL             - Consider dietary consult, starting appetite stimulant, therapy     10) At Risk For Nausea and Vomiting Secondary To Therapy             - Zofran changed to PRN              - Scopolamine patch every 3 days             - Ativan IV PRN for breakthrough N/V available     11) Hyperglycemia, Steroid-Induced: RESOLVED    12) Transaminitis Secondary To Therapy: (WORSENING)  - AST: 112 U/L, ALT: 248 U/L 4 4/7/2025  - Bilirubin total 0.5 mg/dL 4/7/2025  - Will continue to monitor  - Monday & Thursday hepatic labs    13) At Risk For Hypovitaminosis D             - Continue Vitamin D3 supplement daily     14) Central Access:  - Double-lumen CVL placed 2/28/2025 by surgery  - Saline flush per protocol     15) Psychosocial:  - Dr. Ortega following     16) Social:              - Referred to Social Work and financial navigator     Disposition: Continue with pain management     Pepe Faye MD  Pediatric Hematology / Oncology  [facility]   Direct Ph. [phone] / Cell. [phone]  [email or web address]

## 2025-04-09 NOTE — PROGRESS NOTES
Pediatric Hematology/Oncology  Daily Progress Note      Patient Name:  Tomas Jean-Baptiste  : 2000  MRN: [id number]    Location of Service:  [facility] - Cancer Nursing Unit  Date of Service: 2025  Time: 10:00 AM    Hospital Day: 19    Protocol / Treatment Plan: S/P Individualized Re-Induction chemotherapy, 3 Drug + Tyrosine Kinase Inhibitor.  No with monotherapy TKI.    SUBJECTIVE:     Afebrile overnight with Tmax 98.3 °F.  Doing tremendously better this morning. Tomas Roy reports that his pain and aches are nearly resolved after restarting steroids.  He reports that his energy and activity have improved.  He denies any headaches, changes in vision or neurologic status changes.  No complaints of any nausea, vomiting or constipation.  He does report that he has loose and watery stools however no cramping.  No blood in stool.  No other concerns for GI issues.  No complaints of any shortness of breath or difficulty with breathing.  No cough.  Not complaining of any easy bruising or bleeding.  No issues with rashes or skin changes.  Reports that he is feeling better after receiving PRBCs yesterday without any adverse events.  Still with poor diet, drinking only milkshakes at this time.  Tolerating his medicines otherwise.  No other concerns or complaints at this time.    Review of Systems:     Constitutional: Afebrile, considerable improvement overnight.  This morning, much improved energy, activity and improved mood.  Still with poor appetite and oral intake.  HENT: Negative for auditory changes or ear pain, no nosebleeds, no congestion, no rhinorrhea, no sore throat.  No mouth sores.   Eyes: Negative for visual changes.  Respiratory: Negative for shortness of breath or cough.  Cardiovascular: Negative.  Gastrointestinal: Negative for nausea, vomiting, abdominal pain, constipation. No complaints of blood stool.  Improved pain with defecation.  Is having some loose stool  "currently.  Genitourinary: Negative.  Musculoskeletal: Aches and pains nearly resolved.  Skin: Negative for rash or skin infection.  Neurological: Negative for numbness, tingling, sensory changes, weakness or headaches.    Endo/Heme/Allergies: No bruising/bleeding easily.    Psychiatric/Behavioral: Much improved mood.     OBJECTIVE:     Max Temp: Temp (24hrs), Av.6 °C (97.9 °F), Min:36.2 °C (97.2 °F), Max:36.8 °C (98.3 °F)    Vitals: /76   Pulse 98   Temp 36.7 °C (98.1 °F) (Oral)   Resp 16   Ht 1.651 m (5' 5\")   Wt 72.3 kg (159 lb 6.3 oz)   SpO2 99%   BMI 26.52 kg/m²     I/O:   Intake/Output Summary (Last 24 hours) at 2025 1525  Last data filed at 2025 0408  Gross per 24 hour   Intake 270 ml   Output 1435 ml   Net -1165 ml     Labs:     Latest Reference Range & Units 25 00:00   WBC 4.8 - 10.8 K/uL 3.3 (L)   RBC 4.70 - 6.10 M/uL 2.67 (L)   Hemoglobin 14.0 - 18.0 g/dL 8.1 (L)   Hematocrit 42.0 - 52.0 % 23.6 (L)   MCV 81.4 - 97.8 fL 88.4   MCH 27.0 - 33.0 pg 30.3   MCHC 32.3 - 36.5 g/dL 34.3   RDW 35.9 - 50.0 fL 47.3   Platelet Count 164 - 446 K/uL 137 (L)   MPV 9.0 - 12.9 fL 10.3   Neutrophils-Polys 44.00 - 72.00 % 89.20 (H)   Neutrophils (Absolute) 1.82 - 7.42 K/uL 2.90   Lymphocytes 22.00 - 41.00 % 6.20 (L)   Lymphs (Absolute) 1.00 - 4.80 K/uL 0.20 (L)   Monocytes 0.00 - 13.40 % 2.80   Monos (Absolute) 0.00 - 0.85 K/uL 0.09   Eosinophils 0.00 - 6.90 % 0.00   Eos (Absolute) 0.00 - 0.51 K/uL 0.00   Basophils 0.00 - 1.80 % 0.00   Baso (Absolute) 0.00 - 0.12 K/uL 0.00   Immature Granulocytes 0.00 - 0.90 % 1.80 (H)   Immature Granulocytes (abs) 0.00 - 0.11 K/uL 0.06   Nucleated RBC 0.00 - 0.20 /100 WBC 1.50 (H)   NRBC (Absolute) K/uL 0.05   (L): Data is abnormally low  (H): Data is abnormally high     Physical Exam:    Constitutional: Day and night difference.  Increased energy and activity.  Engaged.  Overall doing well.  HENT: Normocephalic and atraumatic.  No rhinorrhea. Oropharynx is " clear and moist.   Eyes: Conjunctivae are normal. EOMI. Non-icteric. Pupils equal and round.  Neck: Normal range of motion of neck, no adenopathy.    Cardiovascular: Normal rate, regular rhythm.  No murmur. DP/radial pulses 2+, cap refill < 2 sec.  Pulmonary/Chest: Effort normal. No respiratory distress. Symmetric expansion.  No crackles or wheezes. Abdomen: Soft. Bowel sounds are normal. No distension and no mass. There is no hepatosplenomegaly.   Genitourinary:  Deferred  Musculoskeletal: Normal range of motion of lower and upper extremities bilaterally.   Neurological: Alert and oriented to person and place. Exhibits normal muscle tone bilaterally in upper and lower extremities. Gait not assessed. Coordination normal.    Skin: Skin is warm, dry and pink.  No rash or evidence of skin infection.   Psychiatric: Much improved mood.    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 Lidocaine patch can be used if pain focal.              - Using Oxycodone 5-10 mg PRN for pain   - Given 100 mg IV hydrocortisone x1  - Restarted prednisone 60 mg PO BID 4/7/2025  - Weaning - PO BID today  - Per protocol for CAR-T, needs 72-hour washout for collection of cells.  Will need to be weaned off of steroids entirely by evening of 4/12/2025  - Will discharge with 30 mg PO BID x 1 day, 20 mg PO BID x 1 day, 10 mg PO BID x 1 days and 10 mg daily x 1              - Will continue to monitor recurrence of withdrawal     2) Constipation/Rectal Pain/Pain with Defecation/Hematochezia: RESOLVING  - Pain was resolved on  4/30/2025, then recurred. No longer as severe as upon admission, but still recurs when passing stool. This has been getting progressively better over the past few days.   - Morphine 2 mg IV every 3 hrs PRN for moderate-severe pain not controlled by oxycodone.   - Oxycodone 5-10 mg PO every 4 hrs PRN for moderate-severe pain  - Senna 2 tablets BID, titrate to effect  - Preparation H, apply externally   - Sitz bath PRN  - MR rectum not available as an inpatient. MRI pelvis demonstrated mild diffuse increase signal throughout the pelvic muscles possibly related to inflammation. No obvious sinus tract or fluid collection in the anus or ischial anal fossa.  - Will continue to monitor clinically    - Now with diarrhea/liquid stools.  Continue to monitor closely for concerns of infectious etiology.     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 Admitted for Fever and Neutropenia 2/27/2025 and relapse was confirmed  - Double-lumen Broviac line placement, diagnostic bone marrow evaluation to include aspirate and biopsy, flow cytometry and FISH to confirm relapse at bedside 2/28/2025  - Testicular negative at relapse  - CSF negative consistent with CNS1  - Confirmed 13% blasts in hemodilute BMA specimen by flow  - Confirmed BCR-ABL1 fusion in 17% cells by FISH  - Discussions had between primary  oncologist and transplant colleagues regarding planning consolidative cellular therapy. Likely plan for HSCT, search for MUD ongoing. After discussing multiple options, they decided on a 3-Drug Re-Induction (VCR/PRED/PEG-ASP) +Imatinib followed by blinatumomab. 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 Met virtually with Dr. Adrian, [facility] B[facility] 3/20/2025    - Has had discussions with both bone marrow transplant as well as some therapeutics (CAR-T) at [facility], will likely pursue two-stage CAR-T trial at [facility] as first line following re-induction    - End of Reinduction evaluations demonstrating phenotypically similar aberrant lymphoblast population of 0.005% (however lacking CD19 or CD22)      4) S/P Individualized Salvage Therapy, 3-Drug Re-Induction:  ** Methotrexate 15 mg IT x 1 on Days 1 (COMPLETE, see separate procedure note)  ** Vincristine 2 mg IV x 1 on Days 1 (COMPLETE), 8 (COMPLETE), 15 (COMPLETE) and 22 (COMPLETE)  ** Prednisone 30 mg/m2/dose = 60 mg PO BID x 28 days  (COMPLETE)  ** PEG-Aspargase 2000 IU/m2 x 1 on Day 4 in OPIC (COMPLETE)   ** Imatinib 400 mg PO QAM and 250 mg PO QHS (started 3/6/2025, given resistence, discontinued)  ** Continue Dasatinib 70 mg PO BID, started 3/22/2025      - S/P End of Reinduction evaluations with bone marrow aspirate 2025 in OR.  Sent for MRD analysis as well as BCR-ABL1 RT-PCR - PENDING  - S/P lumbar puncture with methotrexate 15 mg IT x 1 on 2025 in OR (COMPLETE, see separate procedure note)    - Will need to discontinue Dasatinib 2025 to collect T cells      5) Pancytopenia Secondary to Leukemia and Therapy:  - WBC: 3300/microliters, Hemoglobin: 8.1 gm/dL, Platelets: 137/microliters  - ANC recovered and at 2900/microliters, A/microliters  - Transfuse irradiated PRBC for Hgb<7 g/dL or symptomatic.   - Transfuse irradiated platelets for platelet count of <10,000 cells/uL or symptomatic               - No transfusions indicated today          6) At Risk for Opportunistic Infections:  - Bactrim -160 mg PO BID Sat/Sun for pneumocystis ppx  - HSV1 + IgG, 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. No clinical lesions  - Chlorhexidine mouthwash 4 times daily    7) History of Rothia mucilaginosa Bacteremia:              - Blood cultures obtained on presentation 3/22/2025 from CVL (both lumens) with Rothia mucilaginosa               - 3/25/25 Blood Cx negative to date              - Was placed on empiric cefepime and Flagyl upon admission               - Vancomycin started for Rothia on 3/23/2025              - Completed therapy and discontinued Cefepime, Flagyl and Vancomycin on 4/4/2025   - Has been off of antibiotics for several days     8) FEN/GI:  - Regular diet    9) At Risk For Malnutrition Secondary To Poor PO Intake:             - Albumin low at 2.2 gm/dL             - Prealbumin 16.6 mg/dL             - Cyproheptadine 4 mg PO TID ordered for outpatient     10) At Risk For Nausea and Vomiting Secondary To Therapy             - Zofran changed to PRN              - Scopolamine patch every 3 days             - Ativan IV PRN for breakthrough N/V available     - Prescription of Ativan written for home     11) Hyperglycemia, Steroid-Induced: RESOLVED    12) Transaminitis Secondary To Therapy: (WORSENING)  - AST: 112 U/L, ALT: 248 U/L 4 4/7/2025  - Bilirubin total 0.5 mg/dL 4/7/2025  - Will continue to monitor  - Monday & Thursday hepatic labs    13) At Risk For Hypovitaminosis D             - Continue Vitamin D3 supplement daily     14) Central Access:  - Double-lumen CVL placed 2/28/2025 by surgery  - Saline flush per protocol     15) Psychosocial:  - Dr. Orteag following     16) Social:              - Referred to Social Work and financial navigator     Disposition: Anticipate discharge today.    Pepe Faye MD  Pediatric Hematology / Oncology  [facility]   Direct Ph. [phone] / Cell.  [phone]  [email or web address]

## 2025-04-09 NOTE — CARE PLAN
The patient is Watcher - Medium risk of patient condition declining or worsening    Shift Goals  Clinical Goals: blood transfusion, monitor vitals, monitor labs  Patient Goals: rest  Family Goals: None Present    Progress made toward(s) clinical / shift goals:      Problem: Knowledge Deficit - Standard  Goal: Patient and family/care givers will demonstrate understanding of plan of care, disease process/condition, diagnostic tests and medications  Outcome: Progressing     Problem: Pain - Standard  Goal: Alleviation of pain or a reduction in pain to the patient’s comfort goal  Outcome: Progressing

## 2025-04-09 NOTE — DISCHARGE PLANNING
Case Management Discharge Planning    Admission Date: 3/22/2025  GMLOS: 3.8  ALOS: 18    6-Clicks ADL Score: 24  6-Clicks Mobility Score: 24      Anticipated Discharge Dispo: Discharge Disposition: Discharged to home/self care (01)    DME Needed: No    Action(s) Taken: RNCM completed chart review, patient remains inpatient for pain management. Once medically cleared, patient will discharge home with no needs. Patient's significant other will provide transport home.     Escalations Completed: None    Medically Clear: No    Next Steps: pending oncology clearance     Barriers to Discharge: Medical clearance    Is the patient up for discharge tomorrow: No

## 2025-04-09 NOTE — CARE PLAN
Problem: Knowledge Deficit - Standard  Goal: Patient and family/care givers will demonstrate understanding of plan of care, disease process/condition, diagnostic tests and medications  Outcome: Progressing     Problem: Psychosocial  Goal: Patient's level of anxiety will decrease  Outcome: Progressing     The patient is Watcher - Medium risk of patient condition declining or worsening    Shift Goals  Clinical Goals: blood transfusion, monitor vitals, monitor labs  Patient Goals: rest  Family Goals: None Present    Progress made toward(s) clinical / shift goals:  Pt updated on POC    Patient is not progressing towards the following goals:

## 2025-04-10 LAB
COMPONENT P 8504P: NORMAL
SPECIMEN SOURCE: ABNORMAL
SPECIMEN SOURCE: ABNORMAL
T(9;22)(ABL1,BCR) BLD/T QL: ABNORMAL
T(9;22)(ABL1,BCR) BLD/T QL: ABNORMAL

## 2025-04-10 NOTE — DISCHARGE SUMMARY
Pediatric Oncology Patient  Hospital Discharge Summary      ADMISSION DATE:  3/22/2025    SERVICE LOCATION: [facility]    DISCHARGE DATE:  4/9/2025    LENGTH OF STAY: 17    PRIMARY CARE PHYSICIAN:  Margarito Arvizu M.D.    ADMISSION CHIEF COMPLAINT: Rectal pain and blood in stool    ADMISSION DIAGNOSES:   Constipation   Rectal pain   Dyschezia   Hematochezia   Acute lymphoblastic leukemia (ALL) in relapse (HCC) [C91.02]  Bone marrow transplant candidate  Encounter for Antineoplastic Chemotherapy  Imatinib Resistance  Disease Related Pancytopenia  At Risk for Opportunistic Pulmonary Infection  Chemotherapy Induced Nausea and Vomiting  Hyperglycemia, Steroid-induced  Transaminitis  Central Line Access in Place  Depression    DISCHARGE DIAGNOSES:    Constipation   Rectal pain   Dyschezia   Hematochezia   Acute lymphoblastic leukemia (ALL) in relapse (HCC) [C91.02]  Bone marrow transplant candidate  Encounter for Antineoplastic Chemotherapy  Imatinib Resistance  Disease Related Pancytopenia  At Risk for Opportunistic Pulmonary Infection  Chemotherapy Induced Nausea and Vomiting  Hyperglycemia, Steroid-induced  Transaminitis  Central Line Access in Place  Depression    CONSULTATIONS: None    PROCEDURES:    1. MRI Pelvis  2.  Bone marrow Aspiration for MRD  3.  Lumbar Puncture    BLOOD PRODUCTS/TRANSFUSIONS: (IRRADIATED)  PRBC 3/22/2025, 3/28/2025, 4/8/2025  Platelets 3/22/2025, 3/24/2025    HISTORY OF PRESENT ILLNESS:      History of Present Illness: Tomas Jean-Baptiste is a 24 y.o. 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 He presents himself and provides accurate interval clinical history.      JULY was doing well until 3 days ago when he started having rectal pain with defecation. He reports passing  hard stool at that time. He also reports noticing blood in stool at that time. He took OTC stool softener which has helped to have soft stool but he continues to have rectal pain. Initially the pain was only with defecation but now it is also at rest. He denies noticing any more blood in stool. Denies blood with wiping. No abdominal pain/distension. Emesis x 1 today morning. Currently, denies any nausea or emesis. Able to eat and drink well. Voiding OK. No fever, cough, congestion or difficulty breathing. No new neurologic changes. No rash. No bruising or bleeding symptoms.     HOSPITAL COURSE:      On 3/22/2025, Tomas Roy began experiencing worsening rectal pain, pain with defecation and blood in his stool.  For this reason, he was recommended to come into the hospital for direct admission for management.  Upon his presentation, he was noted to have WBC 0.3, Hgb 7.4, platelets of 5 as well as a neutrophil count of 40.  Blood cultures were drawn from both lumens which ultimately ended up growing Rothia mucilaginosa.   Tomas Roy was started on empiric antibiotics and given the concern for enteric pathogens, he was started on cefepime and Flagyl.  He was also provided pain management with morphine, oxycodone as well as Preparation H.  For his leukemia, he continued with his 3 drug reinduction which was only prednisone at the time.  He also started on Dasatinib upon his admission.  Despite concerns for interference with PPIs and antacids, Tomas Roy was ultimately started on some pantoprazole and then famotidine given that he had pretty intense upper GI/substernal pain.  MRI of the pelvis was ultimately obtained on 3/27/2025 and did not demonstrate any pathology of concern.  As such, Flagyl was discontinued. Tomas Roy demonstrated continued improvement clinically with several minor setbacks (3/29/2025).  Given clinical regression, vancomycin was added in case of Rothia resistance.   Fortunately, Rothia susceptibilities returned with carr susceptible organism.  On 4/1/2025,  Tomas Roy began to recover his blood counts and by 4/3/2025 he had recovered fully.  On 4/4/2025, Tomas Roy was brought to the Operating Room for End of Reinduction evaluations.  A bone marrow aspirate was performed as well as lumbar puncture with intrathecal methotrexate. Tomas Roy tolerated the procedures well.  Bone marrow flow cytometry for MRD demonstrated a very minute population of abnormal lymphoblasts at 0.005%.  The cells also demonstrated loss of CD19 and CD22.     Tomas Roy received full course of empiric antibiotics and upon resolution of his counts, all antibiotics were discontinued on 4/4/2025.  He would also complete his steroids on 4/5/2025.  By 4/6/2025 however he began complaining of significant back pain and muscle spasms consistent with steroid withdrawal.  On 4/7/2025, the pain worsened and therefore steroids were restarted with the intention of weaning.  At this time, Tomas Roy was also in conversation and planning phases with [facility] to enroll on a CAR-T trial.  As steroids needed to be weaned prior to start of therapy with a washout period, his wean was intensified.  After restarting steroids he did return back to his baseline with little in the way of pain or myopathy.  Ultimately, on 4/10/2025, he was in good clinical health and ready for discharge.  He was discharged with oral Dasatinib, prednisone we as well as antacids.    The patient was discharged home in stable condition on 4/10/2025.    HOME MEDICATIONS:    No current facility-administered medications on file prior to encounter.     Current Outpatient Medications on File Prior to Encounter   Medication Sig Dispense Refill    dasatinib (SPRYCEL) 70 MG tablet Take 1 Tablet by mouth 2 times a day for 30 days. 60 Tablet 0    famotidine (PEPCID) 20 MG Tab Take 1 Tablet by mouth 2 times a day. 60  Tablet 0    sulfamethoxazole-trimethoprim (BACTRIM DS) 800-160 MG tablet Take 1 Tablet by mouth 2 times a day. 16 Tablet 3    senna-docusate (SENNA-S) 8.6-50 MG Tab Take 1 Tablet by mouth every day. 30 Tablet 0    ondansetron (ZOFRAN ODT) 4 MG TABLET DISPERSIBLE Take 2 Tablets by mouth every 6 hours as needed for Nausea/Vomiting. 20 Tablet 3     Prednisone wean    DIET:  Regular diet, age appropriate.      ACTIVITY:  Regular activity tolerated.      MEDICAL CONDITION:  Stable.    DISCHARGE INSTRUCTIONS:  1) Patient/family instructed to call for fever > 100.4 °F  2) Patient/family instructed to call for concerns of worsening clinical condition  3) Patient/family instructed to call for intractable nausea and vomiting  4) Patient/family instructed to call for any bruising or bleeding  5) Patient/family instructed to call with any other concerns    Patient their family provided on call provider phone number [phone]    Disposition: Planning to travel to [facility] for appointment scheduled 4/14/2025    Pepe Faye MD  Pediatric Hematology / Oncology  [facility]   Direct Ph. [phone] / Cell. [phone]  [email or web address]    60 minutes were spent discharging the patient.  Of this time, more than 50% was spent in the counseling and coordination of his care.

## 2025-04-11 ENCOUNTER — HOSPITAL ENCOUNTER (OUTPATIENT)
Facility: MEDICAL CENTER | Age: 25
End: 2025-04-11
Attending: PEDIATRICS
Payer: COMMERCIAL

## 2025-04-11 ENCOUNTER — HOSPITAL ENCOUNTER (OUTPATIENT)
Dept: PEDIATRIC HEMATOLOGY/ONCOLOGY | Facility: MEDICAL CENTER | Age: 25
End: 2025-04-11
Attending: PEDIATRICS
Payer: COMMERCIAL

## 2025-04-11 VITALS
SYSTOLIC BLOOD PRESSURE: 104 MMHG | RESPIRATION RATE: 18 BRPM | DIASTOLIC BLOOD PRESSURE: 61 MMHG | OXYGEN SATURATION: 93 % | HEART RATE: 111 BPM | TEMPERATURE: 98.8 F

## 2025-04-11 DIAGNOSIS — C91.Z0 B LYMPHOBLASTIC LEUKEMIA WITH T(9;22)(Q34;Q11.2);BCR-ABL1 (HCC): ICD-10-CM

## 2025-04-11 LAB
ALBUMIN SERPL BCP-MCNC: 2.9 G/DL (ref 3.2–4.9)
ALBUMIN/GLOB SERPL: 1.3 G/DL
ALP SERPL-CCNC: 160 U/L (ref 30–99)
ALT SERPL-CCNC: 256 U/L (ref 2–50)
ANION GAP SERPL CALC-SCNC: 7 MMOL/L (ref 7–16)
ANISOCYTOSIS BLD QL SMEAR: ABNORMAL
AST SERPL-CCNC: 89 U/L (ref 12–45)
BASOPHILS # BLD AUTO: 0 % (ref 0–1.8)
BASOPHILS # BLD: 0 K/UL (ref 0–0.12)
BILIRUB SERPL-MCNC: 0.5 MG/DL (ref 0.1–1.5)
BUN SERPL-MCNC: 10 MG/DL (ref 8–22)
CALCIUM ALBUM COR SERPL-MCNC: 9.1 MG/DL (ref 8.5–10.5)
CALCIUM SERPL-MCNC: 8.2 MG/DL (ref 8.5–10.5)
CHLORIDE SERPL-SCNC: 99 MMOL/L (ref 96–112)
CO2 SERPL-SCNC: 28 MMOL/L (ref 20–33)
CREAT SERPL-MCNC: 0.48 MG/DL (ref 0.5–1.4)
EOSINOPHIL # BLD AUTO: 0 K/UL (ref 0–0.51)
EOSINOPHIL NFR BLD: 0 % (ref 0–6.9)
ERYTHROCYTE [DISTWIDTH] IN BLOOD BY AUTOMATED COUNT: 48.5 FL (ref 35.9–50)
GFR SERPLBLD CREATININE-BSD FMLA CKD-EPI: 148 ML/MIN/1.73 M 2
GLOBULIN SER CALC-MCNC: 2.2 G/DL (ref 1.9–3.5)
GLUCOSE SERPL-MCNC: 122 MG/DL (ref 65–99)
HCT VFR BLD AUTO: 30.9 % (ref 42–52)
HGB BLD-MCNC: 10.1 G/DL (ref 14–18)
LYMPHOCYTES # BLD AUTO: 0.57 K/UL (ref 1–4.8)
LYMPHOCYTES NFR BLD: 10.4 % (ref 22–41)
MACROCYTES BLD QL SMEAR: ABNORMAL
MAGNESIUM SERPL-MCNC: 1.8 MG/DL (ref 1.5–2.5)
MANUAL DIFF BLD: NORMAL
MCH RBC QN AUTO: 29.6 PG (ref 27–33)
MCHC RBC AUTO-ENTMCNC: 32.7 G/DL (ref 32.3–36.5)
MCV RBC AUTO: 90.6 FL (ref 81.4–97.8)
MICROCYTES BLD QL SMEAR: ABNORMAL
MONOCYTES # BLD AUTO: 0.67 K/UL (ref 0–0.85)
MONOCYTES NFR BLD AUTO: 12.2 % (ref 0–13.4)
MORPHOLOGY BLD-IMP: NORMAL
MYELOCYTES NFR BLD MANUAL: 1.7 %
NEUTROPHILS # BLD AUTO: 4.16 K/UL (ref 1.82–7.42)
NEUTROPHILS NFR BLD: 74.8 % (ref 44–72)
NEUTS BAND NFR BLD MANUAL: 0.9 % (ref 0–10)
NRBC # BLD AUTO: 0.12 K/UL
NRBC BLD-RTO: 2.2 /100 WBC (ref 0–0.2)
PHOSPHATE SERPL-MCNC: 4 MG/DL (ref 2.5–4.5)
PLATELET # BLD AUTO: 194 K/UL (ref 164–446)
PLATELET BLD QL SMEAR: NORMAL
PMV BLD AUTO: 10.1 FL (ref 9–12.9)
POTASSIUM SERPL-SCNC: 3.5 MMOL/L (ref 3.6–5.5)
PROT SERPL-MCNC: 5.1 G/DL (ref 6–8.2)
RBC # BLD AUTO: 3.41 M/UL (ref 4.7–6.1)
RBC BLD AUTO: PRESENT
SODIUM SERPL-SCNC: 134 MMOL/L (ref 135–145)
WBC # BLD AUTO: 5.5 K/UL (ref 4.8–10.8)

## 2025-04-11 PROCEDURE — 85027 COMPLETE CBC AUTOMATED: CPT

## 2025-04-11 PROCEDURE — 99213 OFFICE O/P EST LOW 20 MIN: CPT

## 2025-04-11 PROCEDURE — 83735 ASSAY OF MAGNESIUM: CPT

## 2025-04-11 PROCEDURE — 99999 PR NO CHARGE: CPT | Performed by: PEDIATRICS

## 2025-04-11 PROCEDURE — 84100 ASSAY OF PHOSPHORUS: CPT

## 2025-04-11 PROCEDURE — 85007 BL SMEAR W/DIFF WBC COUNT: CPT

## 2025-04-11 PROCEDURE — 80053 COMPREHEN METABOLIC PANEL: CPT

## 2025-04-11 PROCEDURE — 36415 COLL VENOUS BLD VENIPUNCTURE: CPT

## 2025-04-11 RX ORDER — FAMOTIDINE 20 MG/1
20 TABLET, FILM COATED ORAL 2 TIMES DAILY
Qty: 60 TABLET | Refills: 0 | Status: SHIPPED | OUTPATIENT
Start: 2025-04-11 | End: 2025-04-14

## 2025-04-11 RX ORDER — SULFAMETHOXAZOLE AND TRIMETHOPRIM 800; 160 MG/1; MG/1
1 TABLET ORAL 2 TIMES DAILY
Qty: 16 TABLET | Refills: 3 | Status: SHIPPED | OUTPATIENT
Start: 2025-04-11

## 2025-04-11 NOTE — PROGRESS NOTES
Pt to [facility] for lab draw and DrReid visit.  Afebrile.  VSS.  Awake and alert in no acute distress.  Labs drawn from the left AC without difficulty / with 1 attempt.   Pt tolerated well.  Visit with Dr. Faye completed. No further orders.

## 2025-04-14 LAB
PATHOLOGY STUDY: ABNORMAL
SPECIMEN SOURCE: ABNORMAL
T(9;22)(ABL1,BCR) MINOR BLD/T QL: DETECTED
T(ABL1,BCR)E1A2/CONTROL BLD/T: 2.09 %

## 2025-04-14 RX ORDER — FAMOTIDINE 20 MG/1
20 TABLET, FILM COATED ORAL 2 TIMES DAILY
Qty: 180 TABLET | Refills: 0 | Status: ON HOLD | OUTPATIENT
Start: 2025-04-14 | End: 2025-04-24

## 2025-04-15 LAB
PATHOLOGY STUDY: ABNORMAL
SPECIMEN SOURCE: ABNORMAL
T(9;22)(ABL1,BCR) MINOR BLD/T QL: DETECTED
T(ABL1,BCR)E1A2/CONTROL BLD/T: 0.01 %

## 2025-04-17 RX ORDER — LORAZEPAM 2 MG/ML
1 CONCENTRATE ORAL EVERY 6 HOURS PRN
Status: CANCELLED | OUTPATIENT
Start: 2025-04-17

## 2025-04-17 RX ORDER — ONDANSETRON 2 MG/ML
8 INJECTION INTRAMUSCULAR; INTRAVENOUS ONCE
Status: CANCELLED | OUTPATIENT
Start: 2025-04-17

## 2025-04-17 RX ORDER — ONDANSETRON 2 MG/ML
8 INJECTION INTRAMUSCULAR; INTRAVENOUS EVERY 8 HOURS PRN
Status: CANCELLED | OUTPATIENT
Start: 2025-04-17

## 2025-04-17 RX ORDER — LIDOCAINE AND PRILOCAINE 25; 25 MG/G; MG/G
CREAM TOPICAL PRN
Status: CANCELLED | OUTPATIENT
Start: 2025-04-17

## 2025-04-17 RX ORDER — PROMETHAZINE HYDROCHLORIDE 6.25 MG/5ML
0.25 SYRUP ORAL EVERY 6 HOURS PRN
Status: CANCELLED | OUTPATIENT
Start: 2025-04-17

## 2025-04-17 NOTE — DOCUMENTATION QUERY
[facility]                                                                       Query Response Note      PATIENT:               REBECA CHEEMA  ACCT #:                  [id number]  MRN:                     [id number]  :                      2000  ADMIT DATE:       3/22/2025 4:24 PM  DISCH DATE:        2025 7:19 PM  RESPONDING  PROVIDER #:        [id number]           QUERY TEXT:    Pancytopenia Secondary to leukemia and therapy is documented in the 3/30- progress notes.    Can the etiology of pancytopenia be further specified?    The patient's clinical indicators include:  3/22 Admission Labs: WBC 0.3; RBC 2.66; Plt 5; ANC 40  3/30 Labs: WBC 0.6; RBC 2.39; Plt 43;     Risk Factors: Relapsed Ph+ B-Acute Lymphoblastic Leukemia, S/P Individualized Re-Induction chemotherapy  Treatments: Monitoring labs, PRBC transfusions, Platelet transfusions, neutropenic precautions      Contact me with any questions.    Thank you for your time and attention,  Akosua Royal RN, CDI  [email or web address]  Connect via email, Voalte or messenger.    Note: If you agree with a listed diagnosis, please remember to include it in your concurrent daily documentation and onto the Discharge Summary.  Options provided:   -- Chemotherapy induced pancytopenia exists in addition to pancytopenia due to leukemia.   -- Pancytopenia due to leukemia only.   -- Other explanation, (Please specify the other explanation)      Query created by: Akosua Royal on 2025 6:04 AM    RESPONSE TEXT:    Chemotherapy induced pancytopenia exists in addition to pancytopenia due to leukemia.          Electronically signed by:  LADONNA MONAE MD 2025 8:51 AM

## 2025-04-22 ENCOUNTER — TELEPHONE (OUTPATIENT)
Dept: PEDIATRIC HEMATOLOGY/ONCOLOGY | Facility: MEDICAL CENTER | Age: 25
End: 2025-04-22
Payer: COMMERCIAL

## 2025-04-22 NOTE — TELEPHONE ENCOUNTER
Received Refill PA request via [facility]  for dasatinib (SPRYCEL) 70 MG tablet . (Quantity:60, Day Supply:30)     Insurance: [employer]  Member ID:  [id number]  BIN: [id number]  PCN: [id number]  Group: [id number]     Ran Test claim via [place] & medication  rejects as it is not covered at this location. Will go ahead and release to preferred pharmacy on file.    [facility]    Anjolina De [facility]  Central Pharmacy Liaison (Rx Coordinator)  [phone]  4/22/2025 1:34 PM

## 2025-04-24 ENCOUNTER — HOSPITAL ENCOUNTER (INPATIENT)
Facility: MEDICAL CENTER | Age: 25
LOS: 6 days | End: 2025-04-30
Attending: PEDIATRICS | Admitting: PEDIATRICS
Payer: COMMERCIAL

## 2025-04-24 DIAGNOSIS — C91.01 ACUTE LYMPHOID LEUKEMIA IN REMISSION (HCC): ICD-10-CM

## 2025-04-24 DIAGNOSIS — M89.8X9 BONE PAIN: ICD-10-CM

## 2025-04-24 DIAGNOSIS — I95.9 HYPOTENSION, UNSPECIFIED HYPOTENSION TYPE: ICD-10-CM

## 2025-04-24 DIAGNOSIS — R50.9 FEVER, UNSPECIFIED FEVER CAUSE: ICD-10-CM

## 2025-04-24 DIAGNOSIS — C91.Z0 B LYMPHOBLASTIC LEUKEMIA WITH T(9;22)(Q34;Q11.2);BCR-ABL1 (HCC): ICD-10-CM

## 2025-04-24 LAB
ALBUMIN SERPL BCP-MCNC: 3.3 G/DL (ref 3.2–4.9)
ALBUMIN/GLOB SERPL: 0.9 G/DL
ALP SERPL-CCNC: 70 U/L (ref 30–99)
ALT SERPL-CCNC: 29 U/L (ref 2–50)
ANION GAP SERPL CALC-SCNC: 12 MMOL/L (ref 7–16)
AST SERPL-CCNC: 28 U/L (ref 12–45)
BASOPHILS # BLD AUTO: 0.2 % (ref 0–1.8)
BASOPHILS # BLD: 0.01 K/UL (ref 0–0.12)
BILIRUB SERPL-MCNC: 0.7 MG/DL (ref 0.1–1.5)
BUN SERPL-MCNC: 3 MG/DL (ref 8–22)
CALCIUM ALBUM COR SERPL-MCNC: 9.2 MG/DL (ref 8.5–10.5)
CALCIUM SERPL-MCNC: 8.6 MG/DL (ref 8.5–10.5)
CHLORIDE SERPL-SCNC: 99 MMOL/L (ref 96–112)
CO2 SERPL-SCNC: 24 MMOL/L (ref 20–33)
CREAT SERPL-MCNC: 0.52 MG/DL (ref 0.5–1.4)
EOSINOPHIL # BLD AUTO: 0.01 K/UL (ref 0–0.51)
EOSINOPHIL NFR BLD: 0.2 % (ref 0–6.9)
ERYTHROCYTE [DISTWIDTH] IN BLOOD BY AUTOMATED COUNT: 57.5 FL (ref 35.9–50)
GFR SERPLBLD CREATININE-BSD FMLA CKD-EPI: 145 ML/MIN/1.73 M 2
GLOBULIN SER CALC-MCNC: 3.7 G/DL (ref 1.9–3.5)
GLUCOSE SERPL-MCNC: 95 MG/DL (ref 65–99)
HCT VFR BLD AUTO: 24 % (ref 42–52)
HGB BLD-MCNC: 7.6 G/DL (ref 14–18)
IMM GRANULOCYTES # BLD AUTO: 0.04 K/UL (ref 0–0.11)
IMM GRANULOCYTES NFR BLD AUTO: 0.8 % (ref 0–0.9)
LYMPHOCYTES # BLD AUTO: 0.83 K/UL (ref 1–4.8)
LYMPHOCYTES NFR BLD: 17.4 % (ref 22–41)
MCH RBC QN AUTO: 29 PG (ref 27–33)
MCHC RBC AUTO-ENTMCNC: 31.7 G/DL (ref 32.3–36.5)
MCV RBC AUTO: 91.6 FL (ref 81.4–97.8)
MONOCYTES # BLD AUTO: 0.45 K/UL (ref 0–0.85)
MONOCYTES NFR BLD AUTO: 9.5 % (ref 0–13.4)
NEUTROPHILS # BLD AUTO: 3.42 K/UL (ref 1.82–7.42)
NEUTROPHILS NFR BLD: 71.9 % (ref 44–72)
NRBC # BLD AUTO: 0 K/UL
NRBC BLD-RTO: 0 /100 WBC (ref 0–0.2)
PLATELET # BLD AUTO: 420 K/UL (ref 164–446)
PMV BLD AUTO: 8.7 FL (ref 9–12.9)
POTASSIUM SERPL-SCNC: 3 MMOL/L (ref 3.6–5.5)
PROT SERPL-MCNC: 7 G/DL (ref 6–8.2)
RBC # BLD AUTO: 2.62 M/UL (ref 4.7–6.1)
SODIUM SERPL-SCNC: 135 MMOL/L (ref 135–145)
WBC # BLD AUTO: 4.8 K/UL (ref 4.8–10.8)

## 2025-04-24 PROCEDURE — 700111 HCHG RX REV CODE 636 W/ 250 OVERRIDE (IP): Mod: JZ | Performed by: PEDIATRICS

## 2025-04-24 PROCEDURE — 700102 HCHG RX REV CODE 250 W/ 637 OVERRIDE(OP): Performed by: PEDIATRICS

## 2025-04-24 PROCEDURE — 87040 BLOOD CULTURE FOR BACTERIA: CPT

## 2025-04-24 PROCEDURE — 85025 COMPLETE CBC W/AUTO DIFF WBC: CPT

## 2025-04-24 PROCEDURE — 700105 HCHG RX REV CODE 258: Performed by: PEDIATRICS

## 2025-04-24 PROCEDURE — 99223 1ST HOSP IP/OBS HIGH 75: CPT | Performed by: PEDIATRICS

## 2025-04-24 PROCEDURE — 80053 COMPREHEN METABOLIC PANEL: CPT

## 2025-04-24 PROCEDURE — A9270 NON-COVERED ITEM OR SERVICE: HCPCS | Performed by: PEDIATRICS

## 2025-04-24 PROCEDURE — 770004 HCHG ROOM/CARE - ONCOLOGY PRIVATE *

## 2025-04-24 RX ORDER — AMOXICILLIN 250 MG
1 CAPSULE ORAL DAILY
Status: DISCONTINUED | OUTPATIENT
Start: 2025-04-24 | End: 2025-04-30 | Stop reason: HOSPADM

## 2025-04-24 RX ORDER — LORAZEPAM 2 MG/ML
1 CONCENTRATE ORAL EVERY 6 HOURS PRN
Status: DISCONTINUED | OUTPATIENT
Start: 2025-04-24 | End: 2025-04-30 | Stop reason: HOSPADM

## 2025-04-24 RX ORDER — HYDROCODONE BITARTRATE AND ACETAMINOPHEN 5; 325 MG/1; MG/1
1 TABLET ORAL EVERY 4 HOURS PRN
Refills: 0 | Status: DISCONTINUED | OUTPATIENT
Start: 2025-04-24 | End: 2025-04-25

## 2025-04-24 RX ORDER — CYPROHEPTADINE HYDROCHLORIDE 4 MG/1
4 TABLET ORAL 3 TIMES DAILY
Status: DISCONTINUED | OUTPATIENT
Start: 2025-04-24 | End: 2025-04-30 | Stop reason: HOSPADM

## 2025-04-24 RX ORDER — DEXAMETHASONE 4 MG/1
12 TABLET ORAL ONCE
Status: COMPLETED | OUTPATIENT
Start: 2025-04-24 | End: 2025-04-24

## 2025-04-24 RX ORDER — CALCIUM CARBONATE 500 MG/1
1500 TABLET, CHEWABLE ORAL
Status: DISCONTINUED | OUTPATIENT
Start: 2025-04-24 | End: 2025-04-30 | Stop reason: HOSPADM

## 2025-04-24 RX ORDER — PROMETHAZINE HYDROCHLORIDE 6.25 MG/5ML
0.25 SYRUP ORAL EVERY 6 HOURS PRN
Status: DISCONTINUED | OUTPATIENT
Start: 2025-04-24 | End: 2025-04-30 | Stop reason: HOSPADM

## 2025-04-24 RX ORDER — VITAMIN B COMPLEX
1000 TABLET ORAL DAILY
Status: DISCONTINUED | OUTPATIENT
Start: 2025-04-24 | End: 2025-04-30 | Stop reason: HOSPADM

## 2025-04-24 RX ORDER — ACETAMINOPHEN 325 MG/1
650 TABLET ORAL EVERY 6 HOURS PRN
Status: DISCONTINUED | OUTPATIENT
Start: 2025-04-24 | End: 2025-04-30 | Stop reason: HOSPADM

## 2025-04-24 RX ORDER — ONDANSETRON 2 MG/ML
8 INJECTION INTRAMUSCULAR; INTRAVENOUS ONCE
Status: COMPLETED | OUTPATIENT
Start: 2025-04-24 | End: 2025-04-24

## 2025-04-24 RX ORDER — SULFAMETHOXAZOLE AND TRIMETHOPRIM 800; 160 MG/1; MG/1
1 TABLET ORAL
Status: DISCONTINUED | OUTPATIENT
Start: 2025-04-26 | End: 2025-04-30 | Stop reason: HOSPADM

## 2025-04-24 RX ORDER — DASATINIB 70 MG/1
70 TABLET, FILM COATED ORAL 2 TIMES DAILY
Status: DISCONTINUED | OUTPATIENT
Start: 2025-04-25 | End: 2025-04-30 | Stop reason: HOSPADM

## 2025-04-24 RX ORDER — ONDANSETRON 2 MG/ML
8 INJECTION INTRAMUSCULAR; INTRAVENOUS EVERY 8 HOURS PRN
Status: DISCONTINUED | OUTPATIENT
Start: 2025-04-24 | End: 2025-04-30 | Stop reason: HOSPADM

## 2025-04-24 RX ADMIN — ONDANSETRON 8 MG: 2 INJECTION INTRAMUSCULAR; INTRAVENOUS at 15:26

## 2025-04-24 RX ADMIN — ACETAMINOPHEN 650 MG: 325 TABLET ORAL at 19:18

## 2025-04-24 RX ADMIN — BLINATUMOMAB 28 MCG: KIT INTRAVENOUS at 16:40

## 2025-04-24 RX ADMIN — LORAZEPAM 1 MG: 2 LIQUID ORAL at 16:57

## 2025-04-24 RX ADMIN — DEXAMETHASONE 12 MG: 4 TABLET ORAL at 15:25

## 2025-04-24 ASSESSMENT — SOCIAL DETERMINANTS OF HEALTH (SDOH)
WITHIN THE LAST YEAR, HAVE YOU BEEN HUMILIATED OR EMOTIONALLY ABUSED IN OTHER WAYS BY YOUR PARTNER OR EX-PARTNER?: NO
WITHIN THE PAST 12 MONTHS, YOU WORRIED THAT YOUR FOOD WOULD RUN OUT BEFORE YOU GOT THE MONEY TO BUY MORE: NEVER TRUE
WITHIN THE LAST YEAR, HAVE YOU BEEN AFRAID OF YOUR PARTNER OR EX-PARTNER?: NO
WITHIN THE PAST 12 MONTHS, THE FOOD YOU BOUGHT JUST DIDN'T LAST AND YOU DIDN'T HAVE MONEY TO GET MORE: NEVER TRUE
IN THE PAST 12 MONTHS, HAS THE ELECTRIC, GAS, OIL, OR WATER COMPANY THREATENED TO SHUT OFF SERVICE IN YOUR HOME?: NO
WITHIN THE LAST YEAR, HAVE YOU BEEN KICKED, HIT, SLAPPED, OR OTHERWISE PHYSICALLY HURT BY YOUR PARTNER OR EX-PARTNER?: NO
WITHIN THE LAST YEAR, HAVE TO BEEN RAPED OR FORCED TO HAVE ANY KIND OF SEXUAL ACTIVITY BY YOUR PARTNER OR EX-PARTNER?: NO

## 2025-04-24 ASSESSMENT — LIFESTYLE VARIABLES
ALCOHOL_USE: NO
HAVE YOU EVER FELT YOU SHOULD CUT DOWN ON YOUR DRINKING: NO
ON A TYPICAL DAY WHEN YOU DRINK ALCOHOL HOW MANY DRINKS DO YOU HAVE: 0
TOTAL SCORE: 0
EVER HAD A DRINK FIRST THING IN THE MORNING TO STEADY YOUR NERVES TO GET RID OF A HANGOVER: NO
CONSUMPTION TOTAL: NEGATIVE
HOW MANY TIMES IN THE PAST YEAR HAVE YOU HAD 5 OR MORE DRINKS IN A DAY: 0
TOTAL SCORE: 0
TOTAL SCORE: 0
EVER FELT BAD OR GUILTY ABOUT YOUR DRINKING: NO
AVERAGE NUMBER OF DAYS PER WEEK YOU HAVE A DRINK CONTAINING ALCOHOL: 0
HAVE PEOPLE ANNOYED YOU BY CRITICIZING YOUR DRINKING: NO

## 2025-04-24 ASSESSMENT — COGNITIVE AND FUNCTIONAL STATUS - GENERAL
MOBILITY SCORE: 24
SUGGESTED CMS G CODE MODIFIER DAILY ACTIVITY: CH
SUGGESTED CMS G CODE MODIFIER MOBILITY: CH
DAILY ACTIVITIY SCORE: 24

## 2025-04-24 ASSESSMENT — PATIENT HEALTH QUESTIONNAIRE - PHQ9
SUM OF ALL RESPONSES TO PHQ9 QUESTIONS 1 AND 2: 1
6. FEELING BAD ABOUT YOURSELF - OR THAT YOU ARE A FAILURE OR HAVE LET YOURSELF OR YOUR FAMILY DOWN: NOT AL ALL
3. TROUBLE FALLING OR STAYING ASLEEP OR SLEEPING TOO MUCH: SEVERAL DAYS
9. THOUGHTS THAT YOU WOULD BE BETTER OFF DEAD, OR OF HURTING YOURSELF: NOT AT ALL
5. POOR APPETITE OR OVEREATING: NOT AT ALL
8. MOVING OR SPEAKING SO SLOWLY THAT OTHER PEOPLE COULD HAVE NOTICED. OR THE OPPOSITE, BEING SO FIGETY OR RESTLESS THAT YOU HAVE BEEN MOVING AROUND A LOT MORE THAN USUAL: NOT AT ALL
2. FEELING DOWN, DEPRESSED, IRRITABLE, OR HOPELESS: SEVERAL DAYS
7. TROUBLE CONCENTRATING ON THINGS, SUCH AS READING THE NEWSPAPER OR WATCHING TELEVISION: SEVERAL DAYS
SUM OF ALL RESPONSES TO PHQ QUESTIONS 1-9: 4
4. FEELING TIRED OR HAVING LITTLE ENERGY: SEVERAL DAYS
1. LITTLE INTEREST OR PLEASURE IN DOING THINGS: NOT AT ALL

## 2025-04-24 ASSESSMENT — PAIN DESCRIPTION - PAIN TYPE: TYPE: ACUTE PAIN

## 2025-04-24 ASSESSMENT — FIBROSIS 4 INDEX: FIB4 SCORE: 0.66

## 2025-04-24 NOTE — PROGRESS NOTES
Chemotherapy Verification - PRIMARY RN      Height = 165.1 cm  Weight = 66.8 kg  BSA = 1.75 m2       Medication: Blinatumomab  Dose: 28 mcg set dose  Calculated Dose: set dose                             (In mg/m2, AUC, mg/kg)       I confirm this process was performed independently with the BSA and all final chemotherapy dosing calculations congruent.  Any discrepancies of 10% or greater have been addressed with the chemotherapy pharmacist. The resolution of the discrepancy has been documented in the EPIC progress notes.

## 2025-04-24 NOTE — PROGRESS NOTES
Chemotherapy Verification - SECONDARY RN       Height = 165.1 cm  Weight = 66.8 kg  BSA = 1.75 m2       Medication: blinatumomab  Dose: 28 mcg fixed dose  Calculated Dose: 28 mcg fixed dose (ordered dose: 28 mcg fixed dose)                            (In mg/m2, AUC, mg/kg)       I confirm that this process was performed independently.

## 2025-04-24 NOTE — PROGRESS NOTES
Pharmacy Chemotherapy Calculations  Dx: Relapsed B-ALL (ph+)   Cycle 1, Day 1  Previous treatment = s/p 3 drug Re-Induction with TKI (last 4/4/25)    Protocol:  Individualized Immunotherapy to Bridge to Transplant, Blinatumomab (+Dasatinib)   Blinatumomab 28 mcg IV continuous infusion over 24 hours daily on Days 1-28  Dasatinib  mg PO daily on Days 1-42    - on Dasatinib 70 mg BID since induction (POM)   42-day cycle for 2-5 cycles until decision to pursue hematopoietic cell transplant, disease progression or unacceptable toxicity    NCCN Guidelines for Acute Lymphoblastic Leukemia V.2.2024.  Erinn Hsu al. N Engl J Med. 2020:383(17):6656-9732.5  Richard Neil al. Lancet Haematol. 2023:10(1):e24-e34,    Allergies:  Amoxicillin       /79   Pulse (!) 127   Temp 37.1 °C (98.7 °F) (Temporal)   Resp 18   Wt 66.8 kg (147 lb 4.3 oz)   SpO2 96%   BMI 24.51 kg/m²  Body surface area is 1.75 meters squared.    Labs 4/24/25:  ANC~ 3420 Plt = 420k   Hgb = 7.6   WBC = 4.8   SCr = 0.52mg/dL CrCl ~ >125 mL/min  (min SCr 0.7 used)  AST/ALT/AP = WNL TBili = 0.7    Ppx: Bactrim     blinatumomab 28 mcg fixed dose   Fixed dose, no calculation needed = 28 mcg CIVI over 24 hours    Brandy Larsen, XochiltD

## 2025-04-24 NOTE — PROGRESS NOTES
Pharmacy Chemotherapy Verification Note:    Dx: relapsed B-ALL (ph+)        Protocol: Blincyto + TKI Consolidation     Blinatumomab (Blincyto) 28 mcg IV continuous infusion over 24 hrs daily on Days 1-28  Dasatinib 140 mg PO daily on Days 1-42 (or ponatinib 15 mg PO daily on Days 1-42)   - on Dasatinib 70 mg BID since induction (POM)  42-day cycle until transplant, disease progression, or unacceptable toxicity  NCCN Guidelines for ALL. V.2.2024.  Meg DUMAS et al. NEJM. 2020;383(17):1613-23.  david Quinones al. Lancet Haematol. 2023;10(1):e24-e34.    Allergies:  Amoxicillin     /79   Pulse (!) 127   Temp 37.1 °C (98.7 °F) (Temporal)   Resp 18   Wt 66.8 kg (147 lb 4.3 oz)   SpO2 96%   BMI 24.51 kg/m²  Body surface area is 1.75 meters squared.    Labs from 4/24/25 reviewed - all within treatment parameters.     Drug Order   (Drug name, dose, route, IV Fluid & volume, frequency, number of doses) Cycle: 1, Day 1 of 28      Previous treatment: s/p 3-Drug reinduction, D29 LP on 4/4/25     Medication = blinatumomab (Blincyto)  Base Dose = 28 mcg/day fixed dose  Calc Dose: Base Dose x 1d = 28 mcg  Final Dose = 28 mg  Route = CIVI  Fluid & Volume =  mL (+ overfill)  Admin Duration = Over 24hrs via CADD pump @ 10 mL/hr   Days 1-28  Bag size may change to accommodate up to 7 day infusion        <10% difference, okay to treat with final dose     By my signature below, I confirm this process was performed independently with the BSA and all final chemotherapy dosing calculations congruent. I have reviewed the above chemotherapy order and that my calculation of the final dose and BSA (when applicable) corroborate those calculations of the  pharmacist.     Judy Bryant, PharmD, BCOP

## 2025-04-24 NOTE — DISCHARGE PLANNING
Care Transition Team Assessment    Patient is a 24 year old male admitted for chemotherapy. Please see patient's H&P for prior medical history.RNCM met with patient at bedside to complete assessment. Patient is A&OX4 and able to verify information on the face sheet. Patient currently lives with his girlfriend in a single story house with no steps to enter, Zeinab Lees (p)[phone]; emergency contact and NOK. No Advanced directive on file. Patient reports, prior to admission patient is independent with ADL's and IADL's. Patient does not use any DME at baseline. Patient reports his girlfriend is good support for him. Patient currently works full time. Patient's PCP is Margarito Arvizu. Patient's preferred pharmacy is [facility] on [address]. Patient denies a history of SNF/IPR nor HHC use in the past. Patient denies any SA or MH concerns. Patient confirmed medical coverage via [employer] and Medicare. Patient has means to transportation and his girlfriend will provide transport once medically stable for discharge. Patient is being followed in Peds Oncology.     Information Source  Orientation Level: Oriented X4  Information Given By: Patient  Who is responsible for making decisions for patient? : Patient    Readmission Evaluation  Is this a readmission?: Yes - planned readmission    Discharge Preparedness  What is your plan after discharge?: Home with help  What are your discharge supports?: Parent, Partner  Prior Functional Level: Ambulatory, Independent with Activities of Daily Living, Independent with Medication Management  Difficulity with ADLs: None  Difficulity with IADLs: None    Functional Assesment  Prior Functional Level: Ambulatory, Independent with Activities of Daily Living, Independent with Medication Management    Finances  Financial Barriers to Discharge: No  Prescription Coverage: Yes    Advance Directive  Advance Directive?: None  Advance Directive offered?: AD Booklet refused    Psychological  Assessment  History of Substance Abuse: None  History of Psychiatric Problems: No  Non-compliant with Treatment: No  Newly Diagnosed Illness: No    Discharge Risks or Barriers  Discharge risks or barriers?: No  Patient risk factors: Complex medical needs    Anticipated Discharge Information  Discharge Disposition: Discharged to home/self care (01)

## 2025-04-24 NOTE — H&P
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 demonstrated a WBC of 0.4, Hgb 8.8 and platelets 125.  ANC at the time was measured at only 170.  JULY was otherwise clinically well appearing.  He did receive a one-time normal saline bolus for his nausea and vomiting and was sent home with instructions to HOLD his oral mercaptopurine and oral methotrexate which began being held on 7/28/2023.  Per protocol, imatinib was continued at previous dose of 600 mg in the morning. Tomas Roy would return to clinic again on 8/2/2023 and 8/7/2023.  On both occasions, ANC remained under 500 and oral therapy (with the exception of imatinib) continue to be held while awaiting count recovery.  Ultimately, on 8/11/2023 after 14 days of therapy being held, Tomas Roy returned to clinic and WBC had increased to 2.1 with ANC increasing to 1500 with an absolute monocyte count of 290. Tomas Roy was then instructed to resume his oral chemotherapy at 100% of  prior dosing with (due to timing with Day 1 of Cycle 2 with LP 3 days later, no weekly MTX was administered).  Imatinib was never held.  On 8/14/2023 he was seen in clinic for Day 1 of Cycle 2 of Maintenance with lumbar puncture, vincristine, and 5-day pulse of steroids.  At the time, his ANC was 2010 and his oral chemotherapy was continued at 100% dosing.  Given his history of previously drop in counts, he was scheduled to come back for repeat labs on 8/29/2023 at which point his WBC count was 1.4 and his ANC remained greater than 500 at 1140.  Again, his therapy was continued with imatinib 550 mg by mouth in the morning as well as 100% dosing of methotrexate and 100% dosing of 6-mercaptopurine.  When Tomas Roy returned to clinic on 9/11/2023 for his Maintenance, Cycle 2, Day 29 therapy he was noted to have had another drop in his WBC to 600 and his ANC accordingly was also decreased at 410.  He did receive vincristine in accordance with protocol as well as starting a 5-day pulse of prednisone per protocol.  Given however that his ANC had dropped below 500 his oral methotrexate and oral 6-MP were again held.  He was instructed return to clinic 1 week later for lab evaluations.  On 9/19/2023 he returned to clinic and WBC had improved slightly to 0.8 however ANC remained low at 260 with an absolute monocyte count of 220.  Given that counts not recovered his oral chemotherapy remained held for an additional week.  On 9/26/2023 at 14 days after initially holding chemotherapy, he was seen back in clinic at which time WBC improved again to 1.1 however ANC did not quite recover and remained at 490 with an absolute monocyte count of 330.  Given that 2 weeks had elapsed with myelosuppression, imatinib was held in addition to oral 6-MP and methotrexate. Tomas Roy was instructed to return to clinic on 9/29/2023 for repeat counts.  On 9/29/2023 WBC had improved to 2.4 and ANC had finally recovered with 1530 and  an absolute monocyte count of 510.  Given a recovery with ANC greater than 750 and platelets greater than 75, oral chemotherapy was restarted at 50% of initial dosing and imatinib was restarted at 100% of initial dosing.  On 10/9/2023, Tomas Roy returned to clinic for his Day 57 of Cycle 2 visit.  At the time of his visit, his ANC had recovered after holding chemotherapy and then restarting at 50% dosing 11 days prior.  He did however in clinic spiked a temperature 102.5 °F.  For this reason despite his ANC being improved, no dose adjustments were made in his chemotherapy.  He continued with 50% dosing with 100% adherence of his 6-MP and methotrexate as well as his imatinib at 550 mg PO QHS.   Tomas Roy was then seen in clinic again on 11/6/2023 for his Day 1 of Cycle 3 of Maintenance therapy.  At the time, his imatinib dose was adjusted back up to 600 mg daily taken in the morning.  Additionally, his methotrexate was adjusted back up to 75% of expected dosing and his mercaptopurine was increased to 75% of expected dosing as well.  He will return to clinic on 12/4/2023 for his Day 29 of Cycle 3 therapy.  At the time, his ANC was exactly 1500 and therefore no dose adjustments were made and he continued at 75% dosing for oral chemotherapy as well as the imatinib dose of 600 mg daily.  On 1/2/2024 he was seen for his Day 57 evaluations and his ANC had dropped to 1450 again not prompting any change in oral chemotherapy dosing.  Tomas Roy completed his Cycle 4 chemotherapy without any adverse events or complications.  He did not have any changes in medications either.  Most recently,  Tomas Roy was seen in clinic on 4/22/2023 for his Day 1 of Cycle 5 chemotherapy.  At the time, a lumbar puncture was performed and IT chemotherapy was provided.  He tolerated the procedure and the therapy well without any sequelae.  His ANC at the time was > 1500 however the dose adjustment was made as this was  "the first ANC greater than 1500 and Tomas Roy had a history of precipitous drops in his counts with increases in his oral chemotherapy.  On 5/20/2024, Tomas Roy presented to clinic for his Cycle 5, Day 29 therapy and evaluations. At that time, he was started on 5 day pulse of prednisone and his oral methotrexate dose was increased to 13 tablets PO weekly (90.78% of expected dosing). His port was removed on 5/20/2024 by Dr. Moise. He completed therapy on 5/30/2024.  Since his completion of therapy and poor removal, he has been seen in follow-up and was most recently seen on 1/15/2025 at which point there was no evidence of relapse or recurrence both clinically or in his labs.  Interval history however is remarkable for upper respiratory symptoms approximately 1-1/2 weeks ago.  At the time, he reported having some nausea and vomiting as well as an upset stomach and sore throat.  The symptoms were thought most likely to be viral and in fact improved consistent with viral illness.  On about 2/24/2025 however symptoms returned and were more flulike in nature with aches and pains and low-grade temperatures.  On the evening of 2/26/2025, JULY called Pediatric Hematology/Oncology to report that he \"feels like I am relapsing\". Tomas Roy was brought in to clinic today for evaluations.  In clinic, CBC, CMP, respiratory viral panel and EBV studies were obtained.  Respiratory viral studies were negative.  CBC however demonstrated a white blood cell count of 1, hemoglobin 10.6, platelets of 53 without any circulating blasts.  His CMP for the most part was normal with mild hyponatremia.  Uric acid was 7.2 and an LDH of 244.  Given these lab values as well as a temperature of 100.6 while in clinic also in the context of an acute infection of his right fourth phalanx, decision was made to bring JULY back to the hospital for admission for fever and neutropenia as well as workup of presumed relapsed disease.  He was " admitted on 2/27/2025.  Workup was remarkable for pancytopenia.  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  In addition to his diagnostic bone marrow and diagnostic lumbar puncture which was negative for disease (CNS 1) he also had a 7 Estonian double-lumen Broviac placed on the same day.  He was started on high-dose prednisone on his admission.  After results confirmed relapsed disease, his case was discussed with [facility] and it was decided that he should receive a 3 drug reinduction.  As such, lumbar puncture with intrathecal chemotherapy was administered on 3/6/2025 and he was given his Day 1 vincristine.  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  On 3/22/2025, Tomas Roy reported significant perirectal pain, especially with stooling and also reported bloody and mucus filled stools at home.  For this reason in addition to not feeling well he was brought in for admission.  On admission he was noted to be neutropenic and also developed fever prompting his stay for fever and neutropenia.  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   Tomas Roy had his End of Reinduction evaluations on 4/4/2025.  Bone marrow at the time demonstrated a minuscule population of atypical lymphocytes initially thought to be consistent with residual disease however below the level of detection.  CAR-T workup had been performed and there was a plan to enroll on a dual CAR protocol at [facility].  Ultimately, Tomas Roy was discharged from the hospital on 4/9/2025 with plans to follow-up at [facility] on 4/14/2025.  Upon his presentation to [facility], there was concern however that his disease status was in complete remission  and/or he had lost CD19 and CD22 expression.  For this reason, rather than being enrolled on study, repeat bone marrow was obtained at [facility].  Repeat bone marrow did not demonstrate any residual disease consistent with complete remission.  BCR-ABL RT-PCR was also consistent with these findings.  Given complete remission, Tomas Roy was no longer considered a candidate for CAR-T and he was discharged back home to [place] to receive consolidating immune therapy prior to transplantation at [facility].  He presents today to begin his consolidating therapy with his first block of Blinatumomab.  Interval history is relatively unremarkable as Tomas Roy has been clinically well with recovery of his energy to near baseline.      On presentation, Tomas Roy is clinically well without any recent or remote illness.  He has remained afebrile without any signs or symptoms of acute illness to include cough, congestion or rhinorrhea.  No pulmonary complaints such as shortness of breath.  Energy and activity are near baseline at this time. Tomas Roy  reports that overall he is feeling quite well.  He denies any headaches, changes in vision or neurologic status changes.  There are no complaints of any nausea, vomiting, diarrhea or constipation.  No longer with any perirectal discomfort or pain.  No complaints of any abdominal distention.   Tomas Roy denies any skin changes or rashes.  He does however report a very tender nodule in his right medial thigh which he just noted yesterday.  This is not associated with any overlying skin changes to include redness.  No overlying warmth.  No other swelling or nodules.  No fevers as above. Tomas Roy also has multiple scratch marks on his arms but denies any active scratches.  He reports that these are due to his cat.  The cat is apparently not feral.  No musculoskeletal aches or pains with the exception of bilateral posterior iliac bone pain  which Tomas Roy reports began with his bilateral bone marrow procedures at [place].  He continues to take occasional oxycodone for the pain.  No other concerns or complaints at this time.    Tomas Roy reports 100% adherence with his Dasatinib through last night.    Review of Systems:     Constitutional: Afebrile.  No recent or remote illness.  Energy and activity are at baseline.  HENT: Negative for auditory changes, nosebleeds and sore throat.  No mouth sores.  Eyes: Negative for visual changes.  Respiratory: Negative for  shortness of breath and stridor.   Cardiovascular: Negative for chest pain and leg swelling.    Gastrointestinal: Negative for nausea, vomiting, abdominal pain, diarrhea, constipation and blood in stool.    Genitourinary: Negative for dysuria and flank pain.    Musculoskeletal: Positive for chronic filgrastim induced bone pain.    Skin: Negative for rash, signs of infection.  Neurological: Negative for numbness, tingling, sensory changes, weakness or headaches.    Endo/Heme/Allergies: Does not bruise/bleed easily.    Psychiatric/Behavioral: No changes in mood, appropriate for age.     PAST MEDICAL HISTORY:     Oncologic History:  2-3 week history of worsening fatigue, right lower extremity pain  Presentation to OSH and diagnosed with right LE superficial thrombus, subsegmental PE and hyperleukocytosis, anemia and thrombocytopenia  Transferred to [facility] for definitive care  Presenting (local) WBC > 440K, Hgb 10.0, platelets 53, (automated differential ANC 3190, ALC 75,310, absolute monocyte count 23799, absolute blast count 340,560)  Uric Acid 15.6, phosphorus 5.6, LDH 1114  Rasburicase x 1 dose given   Peripheral Blood flow cytometry demonstrating CD10 pos, CD19 pos, CD20 neg, CD22 dim (60%) 5/28/2022  Peripheral blood FISH for BCR-ABL1 positive in 98% of analyzed cells     Age at Diagnosis: 21 years  White Blood Cell Count at Presentation: > 440 k/uL  Testicular Disease Status:  Negative (see procedure note 5/30/2022)  CNS Status: 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, diagnostic LP with WBC 1, RBC 3 and no evidence of leukemic blasts 5/30/2022  Steroid Pre-treatment: None  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     All inclusion/exclusion criteria for [id number] met and consent signed - enrolled 5/29/2022   All inclusion/exclusion criteria for [id number] met and consent signed - enrolled 5/30/2022  Confirmatory bone marrow aspirate and biopsy and diagnostic LP + cytarabine 70 mg IT 5/30/2022  Induction therapy (ON STUDY [id number]) started 5/30/2022  Bone marrow immunohistochemistry consistent with diagnosis of B-ALL comprising 90% of marrow cellularity  Bone marrow sample sent to [facility] for [facility] purposes:  Flow cytom  Of the blood pressure little high that is a problem is a cultural problem is well and cultural genetic and everything else like that unfortunately breathalyzers such bad heart disease diabetes things like that  populations etry consistent with peripheral blood, cytogenetics remarkable for known t(9;22)  CSF with WBC 1, RBC 3, no blasts identified on cytospin  FISH results available 5/31/2022 making patient eligible for transfer from [place] [id number] to [place] [address] as eligibility requirements were met for [place] [address]  Patient unenrolled from [place] [address] (BCR-ABL1 fusion positive) 6/1/2022  Consent signed for [place] [address] and patient enrolled 6/1/2022 (see eligibility checklist from 5/31/2022 and consent note from 6/1/2022)  Imatinib 400 mg PO QAM / 200 mg PO QPM started 6/3/2022 (allowed per [place] [address])  Patient completed the first 15 days of a Standard 4-drug Induction on 6/13/2022  Start of [facility] [id number](OS), Induction IA Part 2, Day 15 6/13/2022  End of Induction 1A Part 2 - MRD negative  Start of [facility] [id number](OS), Induction IA Part 2, Day 15 7/5/2022  Induction IB DELAYED 2 weeks 14 days from  "7/26/2022-8/9/2022) for myelosuppression - Start of Day 22 cytarabine block 8/9/2022  Induction IB Day 42 delayed 9 days for myelosuppression - Day 42 evaluations 9/7/2022  End of Induction IB - Flow cytometric MRD negative, MRD by IgH-TCR PCR 00.4360700%  Randomization to AR-Experimental Arm B of [id number]  Start of AR-Experimental Arm B, Interim Maintenance 9/29/2022  Weight based increase in dose of imatinib to 400 mg PO AM and 250 mg PM 9/29/2022  Thrombocytopenia not permissive of proceeding with Day 15 of Interim Maintenance  AR-Experimental Arm B, Interim Maintenance, Day 15 delayed 4 days, start 10/17/2022  AR-Experimental Arm B, Interim Maintenance, Day 29, start 11/1/2022  AR-Experimental Arm B, Interim Maintenance, Day 43, start 11/14/2022  Last does of 6-MP 11/27/2022  AR-Experimental Arm B, Delayed Intensification, Day 1, start 12/6/2022  Admission with Severe Septic Shock 12/27/2022  Imatinib HELD 12/27/2022-1/8/2023  AR-Experimental Arm B, Delayed Intensification, Day 29 DELAYED 14 days with start 1/17/2023  Hospitalization 2/7/2023 on Day 50 of Delayed Intensification with left shoulder pain ultimately diagnosed with Bacteroides fragilis infection  AR-Experimental Arm B, Interim Maintenance, Day 1 DELAYED 7 days with start 2/27/2023  AR-Experimental Arm B, Interim Maintenance, Day 11 DELAYED 4 days for platelets 43K on 3/9/2023  AR-Experimental Arm B, Interim Maintenance, Day 11 VCR given and MTX omitted for platelets 43K 3/13/2023  Imatinib HELD 3/30/2023-4/11/2023  AR-Experimental Arm B, Interim Maintenance, Day 21 (DELAYED 22 DAYS) - administered 4/11/2023 with methotrexate 80 mg/m² IV  AR-Experimental Arm B, Interim Maintenance, Day 31 (\"true\" Day 31 4/25/2023) - VCR and IT MTX given 4/28/2023 - IV MTX omitted  AR-Experimental Arm B, Interim Maintenance, Day 41 met with counts - administered 5/5/2023 with methotrexate 80 mg/m² IV     Start of Maintenance, Cycle 1, Day 1 -5/22/2023  Cranial " radiation 6/5/2023-6/16/2023 (10 fractions)  Maintenance, Cycle 1, Day 29 - 6/23/2023 (no IT MTX given)  Maintenance, Cycle 1, Day 67, presented with fatigue nausea and vomiting found to have ANC less than 500  Methotrexate (oral) and mercaptopurine held 7/27/2023 - continued with imatinib  Methotrexate (oral) and mercaptopurine held 8/2/2023 - continued with imatinib  Methotrexate (oral) and mercaptopurine held 8/7/2023 - continued with imatinib  Restarted methotrexate (oral) and mercaptopurine at 100% previous dosing on 8/11/2023 - continued with imatinib  Maintenance, Cycle 2, Day 1 - 8/14/2023  Maintenance, Cycle 2, Day 29 - 9/11/2023  Methotrexate (oral) and mercaptopurine held 9/11/2023 - continued with imatinib  Methotrexate (oral) and mercaptopurine held 9/19/2023 - continued with imatinib  Methotrexate (oral) and mercaptopurine held 9/26/2023 - HELD imatinib  Methotrexate (oral) restarted at 50% dosing and mercaptopurine restarted at 50% dosing 9/29/2023 - RESTARTED imatinib  Maintenance, Cycle 2, Day 57 - 10/9/2023  Maintenance, Cycle 3, Day 1 - 11/6/2023: Methotrexate (oral) increased to 75% dosing and mercaptopurine increased to 75% dosing.  Imatinib increased to 600 mg QAM 11/6/2023  Maintenance Cycle 3, Day 29: 12/4/2023 No changes made to the dosing of oral chemotherapy  Maintenance, Cycle 4, Day 1: No dose adjustments made however ANC slightly greater than >1500.  Oral chemotherapy did not need weight adjustment.  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     Imatinib discontinued, dasatanib started 3/22/2025    End of Re-Induction 4/4/2025 demonstrating suspicious population of phenotypically atypical cells at  0.005%  BCR-ABL RT-PCR 4/4/2025 detectable at 0.093368%    Repeat bone marrow evaluation at [facility] 4/15/2025 MRD negative  BCR-ABL RT-PCR 4/15/2025 undetectable    Blinatumomab Block 1 4/24/2025     Past Medical History:    1) Previously Healthy  2) 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  3) Hyperleukocytosis  4) Hyperuricemia  5) Hyperphosphatemia  6) Right Lower Extremity Superficial Thrombus  7) Subsegmental Pulmonary Embolism  8) 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  9) Bacteroides fragilis soft tissue infection left shoulder  10) Anxiety/Depression     Past Surgical History:     1) Temporary Right IJ Pharesis Catheter Placement 5/28/2022  2) Right-sided Port-A-Cath placement 8/29/2022  3) IR drainage left calf hematoma  4) Left shoulder I&D  5) Cranial XRT 6/5/2023-6/16/2023     Birth/Developmental History:   1st of three children  Unremarkable pregnancy  Unremarkable delivery     Family History:  No significant family history of cancer.  Both maternal and paternal family history of diabetes.     Immunizations:  UTD     Social History:   Lives with girlfriend.  Graduated from college.  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  Social situation with family is fractured.    Medications:   No current facility-administered medications on file prior to encounter.     Current Outpatient Medications on File Prior to Encounter   Medication Sig Dispense Refill    dasatinib (SPRYCEL) 70 MG tablet Take 1 Tablet by mouth 2 times a day. 60 Tablet 4    sulfamethoxazole-trimethoprim (BACTRIM DS) 800-160 MG tablet Take 1 Tablet by mouth 2 times a day. 16 Tablet 3    calcium carbonate (TUMS EX) 750 MG chewable tablet Chew 2 Tablets 3 times a day with meals. 30 Tablet 2    vitamin D (CHOLECALCIFEROL) 1000 UNIT Tab Take 1 Tablet by mouth every day. 60 Tablet 2    cyproheptadine (PERIACTIN) 4 MG Tab Take 1 Tablet by mouth 3 times a day for 30 days. 90 Tablet 0    LORazepam (ATIVAN) 1 MG Tab Take 1 Tablet  by mouth at bedtime as needed (Nausea and Vomiting) for up to 180 days. 30 Tablet 5    senna-docusate (SENNA-S) 8.6-50 MG Tab Take 1 Tablet by mouth every day. 30 Tablet 0    ondansetron (ZOFRAN ODT) 4 MG TABLET DISPERSIBLE Take 2 Tablets by mouth every 6 hours as needed for Nausea/Vomiting. 20 Tablet 3       OBJECTIVE:     Vitals:   /79   Pulse (!) 127   Temp 37.1 °C (98.7 °F) (Temporal)   Resp 18   Wt 66.8 kg (147 lb 4.3 oz)   SpO2 96%     Labs:    Admission on 04/24/2025   Component Date Value    Sodium 04/24/2025 135     Potassium 04/24/2025 3.0 (L)     Chloride 04/24/2025 99     Co2 04/24/2025 24     Anion Gap 04/24/2025 12.0     Glucose 04/24/2025 95     Bun 04/24/2025 3 (L)     Creatinine 04/24/2025 0.52     Calcium 04/24/2025 8.6     Correct Calcium 04/24/2025 9.2     AST(SGOT) 04/24/2025 28     ALT(SGPT) 04/24/2025 29     Alkaline Phosphatase 04/24/2025 70     Total Bilirubin 04/24/2025 0.7     Albumin 04/24/2025 3.3     Total Protein 04/24/2025 7.0     Globulin 04/24/2025 3.7 (H)     A-G Ratio 04/24/2025 0.9     WBC 04/24/2025 4.8     RBC 04/24/2025 2.62 (L)     Hemoglobin 04/24/2025 7.6 (L)     Hematocrit 04/24/2025 24.0 (L)     MCV 04/24/2025 91.6     MCH 04/24/2025 29.0     MCHC 04/24/2025 31.7 (L)     RDW 04/24/2025 57.5 (H)     Platelet Count 04/24/2025 420     MPV 04/24/2025 8.7 (L)     Neutrophils-Polys 04/24/2025 71.90     Lymphocytes 04/24/2025 17.40 (L)     Monocytes 04/24/2025 9.50     Eosinophils 04/24/2025 0.20     Basophils 04/24/2025 0.20     Immature Granulocytes 04/24/2025 0.80     Nucleated RBC 04/24/2025 0.00     Neutrophils (Absolute) 04/24/2025 3.42     Lymphs (Absolute) 04/24/2025 0.83 (L)     Monos (Absolute) 04/24/2025 0.45     Eos (Absolute) 04/24/2025 0.01     Baso (Absolute) 04/24/2025 0.01     Immature Granulocytes (a* 04/24/2025 0.04     NRBC (Absolute) 04/24/2025 0.00     GFR (CKD-EPI) 04/24/2025 145         Physical Exam:    Constitutional: Well-developed,  well-nourished, and in no distress.  Very well-appearing.  HENT: Normocephalic and atraumatic. No nasal congestion or rhinorrhea. Oropharynx is clear and moist. No oral ulcerations or sores.    Eyes: Conjunctivae are normal. Pupils are equal, round.  EOMI.  Nonicteric.  Glasses.  Neck: Normal range of motion of neck, no adenopathy.    Cardiovascular: Normal rate, regular rhythm and normal heart sounds.  No murmur heard. DP/radial pulses 2+, cap refill < 2 sec.  Pulmonary/Chest: Effort normal and breath sounds normal. No respiratory distress. Symmetric expansion.  No crackles or wheezes.  Abdomen: Soft. Bowel sounds are normal. No distension and no mass. There is no hepatosplenomegaly.    Genitourinary:  Deferred.  Musculoskeletal: Normal range of motion of lower and upper extremities bilaterally. No tenderness to palpation of elbows, wrists, hands, knees, ankles and feet bilaterally.   Palpable 2 cm firm nodule medial mid thigh, tender without any skin changes overlying.  Lymphadenopathy: No cervical adenopathy.  Neurological: Alert and oriented to person and place. Exhibits normal muscle tone bilaterally in upper and lower extremities. Gait normal. Coordination normal.    Skin: Skin is warm, dry and pink.  No rash or evidence of skin infection.  No pallor.   Psychiatric: Mood and affect normal for age.    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 Likely plan for HSCT, search for MUD ongoing. After discussing multiple options, they decided on a 3-Drug Re-Induction (VCR/PRED/PEG-ASP) +Imatinib followed by blinatumomab. 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 Met virtually with Dr. Adrian, [facility] [place] [facility] 3/20/2025     - Has had discussions with both bone marrow transplant as well as some therapeutics (CAR-T) at [facility].  Complete remission after re-induction, making CAR-T ineligible  - End of Reinduction evaluations demonstrating phenotypically similar aberrant lymphoblast population of 0.005% (however lacking CD19 or CD22)   - Repeat BM at [facility] [place] [facility] confirming remission    - Given CR2, will pursue transplantation     4) Individualized Bridging Therapy with Blinatumomab, Day 1:  ** Methotrexate 15 mg IT x 1 on Days 1 (COMPLETED at End of Re-Induction, see separate procedure note)  ** Dexamethasone prior to starting blinatumomab, 12 mg PO x 1  ** Blinatumomab 28 mCg per day IV continuous infusion for 28 days, to start on 4/25/2025. 24-hour infusion until 4/26/2025, 48-hour infusion on 4/27/2025 until 4/28/2025. Discharge to [facility] after three days for  for 7 days cassette change and then discharge  patient home.  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 No clinical lesions  - Chlorhexidine mouthwash 4 times daily     7) History of Rothia mucilaginosa Bacteremia:              - Blood cultures obtained on presentation 3/22/2025 from CVL (both lumens) with Rothia mucilaginosa               - 3/25/25 Blood Cx negative to date              - Was placed on empiric cefepime and Flagyl upon admission               - Vancomycin started for Rothia on 3/23/2025              - Completed therapy and discontinued Cefepime, Flagyl and Vancomycin on 2025   - See nodule in thigh below     8) FEN/GI:  - Regular diet     9) Nodule, Right Medial Thigh:  - Tender and palpable  - Monitor for fever  - Consider possible infectious etiology  - If fever, draw cultures and start empiric antibiotics  - US Soft Tissue    10) At Risk For Nausea and Vomiting Secondary To Therapy             - Zofran changed to PRN              - Scopolamine patch every 3 days             - Ativan IV PRN for breakthrough N/V available                 - Prescription of Ativan written for home     12) Transaminitis Secondary To Therapy: (RESOLVED)  - AST: 28 U/L, ALT: 29 U/L   - Bilirubin total 0.7 mg/dL   - Will continue to  monitor  - Monday & Thursday hepatic labs     13) At Risk For Hypovitaminosis D             - Continue Vitamin D3 supplement daily     14) Central Access:  - Double-lumen CVL placed 2/28/2025 by surgery  - Saline flush per protocol     15) Psychosocial:  - Dr. Ortega following     16) Social:              - Referred to Social Work and financial navigator     Disposition: Admit for three days of ryan Faye MD  Pediatric Hematology / Oncology  [facility]  Cell.  [phone]  Office. [phone]

## 2025-04-25 ENCOUNTER — APPOINTMENT (OUTPATIENT)
Dept: RADIOLOGY | Facility: MEDICAL CENTER | Age: 25
DRG: 839 | End: 2025-04-25
Attending: PEDIATRICS
Payer: COMMERCIAL

## 2025-04-25 LAB
ALBUMIN SERPL BCP-MCNC: 3.4 G/DL (ref 3.2–4.9)
ALBUMIN/GLOB SERPL: 0.9 G/DL
ALP SERPL-CCNC: 65 U/L (ref 30–99)
ALT SERPL-CCNC: 27 U/L (ref 2–50)
ANION GAP SERPL CALC-SCNC: 11 MMOL/L (ref 7–16)
AST SERPL-CCNC: 28 U/L (ref 12–45)
BILIRUB SERPL-MCNC: 0.3 MG/DL (ref 0.1–1.5)
BUN SERPL-MCNC: 6 MG/DL (ref 8–22)
CALCIUM ALBUM COR SERPL-MCNC: 10.1 MG/DL (ref 8.5–10.5)
CALCIUM SERPL-MCNC: 9.6 MG/DL (ref 8.5–10.5)
CHLORIDE SERPL-SCNC: 102 MMOL/L (ref 96–112)
CO2 SERPL-SCNC: 25 MMOL/L (ref 20–33)
CREAT SERPL-MCNC: 0.49 MG/DL (ref 0.5–1.4)
ERYTHROCYTE [DISTWIDTH] IN BLOOD BY AUTOMATED COUNT: 59 FL (ref 35.9–50)
GFR SERPLBLD CREATININE-BSD FMLA CKD-EPI: 147 ML/MIN/1.73 M 2
GLOBULIN SER CALC-MCNC: 3.7 G/DL (ref 1.9–3.5)
GLUCOSE SERPL-MCNC: 117 MG/DL (ref 65–99)
HCT VFR BLD AUTO: 23.3 % (ref 42–52)
HGB BLD-MCNC: 7.3 G/DL (ref 14–18)
MCH RBC QN AUTO: 28.9 PG (ref 27–33)
MCHC RBC AUTO-ENTMCNC: 31.3 G/DL (ref 32.3–36.5)
MCV RBC AUTO: 92.1 FL (ref 81.4–97.8)
PLATELET # BLD AUTO: 447 K/UL (ref 164–446)
PMV BLD AUTO: 8.8 FL (ref 9–12.9)
POTASSIUM SERPL-SCNC: 3.8 MMOL/L (ref 3.6–5.5)
PROT SERPL-MCNC: 7.1 G/DL (ref 6–8.2)
RBC # BLD AUTO: 2.53 M/UL (ref 4.7–6.1)
SODIUM SERPL-SCNC: 138 MMOL/L (ref 135–145)
URATE SERPL-MCNC: 3.3 MG/DL (ref 2.5–8.3)
WBC # BLD AUTO: 3 K/UL (ref 4.8–10.8)

## 2025-04-25 PROCEDURE — 700111 HCHG RX REV CODE 636 W/ 250 OVERRIDE (IP): Mod: JW,TB | Performed by: PEDIATRICS

## 2025-04-25 PROCEDURE — 80053 COMPREHEN METABOLIC PANEL: CPT

## 2025-04-25 PROCEDURE — 30233N1 TRANSFUSION OF NONAUTOLOGOUS RED BLOOD CELLS INTO PERIPHERAL VEIN, PERCUTANEOUS APPROACH: ICD-10-PCS | Performed by: PEDIATRICS

## 2025-04-25 PROCEDURE — 76882 US LMTD JT/FCL EVL NVASC XTR: CPT | Mod: RT

## 2025-04-25 PROCEDURE — 770004 HCHG ROOM/CARE - ONCOLOGY PRIVATE *

## 2025-04-25 PROCEDURE — 700105 HCHG RX REV CODE 258: Performed by: PEDIATRICS

## 2025-04-25 PROCEDURE — 85027 COMPLETE CBC AUTOMATED: CPT

## 2025-04-25 PROCEDURE — 99233 SBSQ HOSP IP/OBS HIGH 50: CPT | Performed by: PEDIATRICS

## 2025-04-25 PROCEDURE — A9270 NON-COVERED ITEM OR SERVICE: HCPCS | Performed by: PEDIATRICS

## 2025-04-25 PROCEDURE — 84550 ASSAY OF BLOOD/URIC ACID: CPT

## 2025-04-25 PROCEDURE — 700102 HCHG RX REV CODE 250 W/ 637 OVERRIDE(OP): Performed by: PEDIATRICS

## 2025-04-25 RX ORDER — HYDROCODONE BITARTRATE AND ACETAMINOPHEN 5; 325 MG/1; MG/1
1-2 TABLET ORAL EVERY 4 HOURS PRN
Refills: 0 | Status: DISCONTINUED | OUTPATIENT
Start: 2025-04-25 | End: 2025-04-30 | Stop reason: HOSPADM

## 2025-04-25 RX ADMIN — HYDROCODONE BITARTRATE AND ACETAMINOPHEN 1 TABLET: 5; 325 TABLET ORAL at 13:50

## 2025-04-25 RX ADMIN — HYDROCODONE BITARTRATE AND ACETAMINOPHEN 1 TABLET: 5; 325 TABLET ORAL at 05:52

## 2025-04-25 RX ADMIN — ANTACID TABLETS 1500 MG: 500 TABLET, CHEWABLE ORAL at 16:50

## 2025-04-25 RX ADMIN — LORAZEPAM 1 MG: 2 LIQUID ORAL at 15:11

## 2025-04-25 RX ADMIN — LORAZEPAM 1 MG: 2 LIQUID ORAL at 05:47

## 2025-04-25 RX ADMIN — BLINATUMOMAB 56 MCG: KIT INTRAVENOUS at 16:53

## 2025-04-25 RX ADMIN — CEFTRIAXONE SODIUM 2000 MG: 10 INJECTION, POWDER, FOR SOLUTION INTRAVENOUS at 05:47

## 2025-04-25 RX ADMIN — ANTACID TABLETS 1500 MG: 500 TABLET, CHEWABLE ORAL at 08:55

## 2025-04-25 RX ADMIN — HYDROCODONE BITARTRATE AND ACETAMINOPHEN 2 TABLET: 5; 325 TABLET ORAL at 22:17

## 2025-04-25 RX ADMIN — ACETAMINOPHEN 650 MG: 325 TABLET ORAL at 15:06

## 2025-04-25 RX ADMIN — ANTACID TABLETS 1500 MG: 500 TABLET, CHEWABLE ORAL at 11:45

## 2025-04-25 ASSESSMENT — PAIN DESCRIPTION - PAIN TYPE
TYPE: ACUTE PAIN

## 2025-04-25 NOTE — DISCHARGE PLANNING
Case Management Discharge Planning    Admission Date: 4/24/2025  GMLOS: 3.9  ALOS: 1    6-Clicks ADL Score: 24  6-Clicks Mobility Score: 24      Anticipated Discharge Dispo: Discharge Disposition: Discharged to home/self care (01)    DME Needed: No    Action(s) Taken: Discussed in IDT rounds, ALL, admitted for blincyto for 3 days, then DC to [facility]. Pt with neutropenic fever last night, on IV abx, blood cultures pending.    Escalations Completed: None    Medically Clear: No    Next Steps: Pending medical clearance    Barriers to Discharge: Medical clearance    Is the patient up for discharge tomorrow: No

## 2025-04-25 NOTE — CARE PLAN
The patient is Stable - Low risk of patient condition declining or worsening    Shift Goals  Clinical Goals: Patient will continue to tolerate chemotherapy, pain control  Patient Goals: Tolerate chemo    Progress made toward(s) clinical / shift goals:    Problem: Pain - Standard  Goal: Alleviation of pain or a reduction in pain to the patient’s comfort goal  Description: Target End Date:  Prior to discharge or change in level of careDocument on Vitals flowsheet1.  Document pain using the appropriate pain scale per order or unit policy2.  Educate and implement non-pharmacologic comfort measures (i.e. relaxation, distraction, massage, cold/heat therapy, etc.)3.  Pain management medications as ordered4.  Reassess pain after pain med administration per policy5.  If opiods administered assess patient's response to pain medication is appropriate per POSS sedation scale6.  Follow pain management plan developed in collaboration with patient and interdisciplinary team (including palliative care or pain specialists if applicable)  Outcome: Progressing     Problem: Knowledge Deficit - Standard  Goal: Patient and family/care givers will demonstrate understanding of plan of care, disease process/condition, diagnostic tests and medications  Description: Target End Date:  1-3 days or as soon as patient condition allowsDocument in Patient Education1.  Patient and family/caregiver oriented to unit, equipment, visitation policy and means for communicating concern2.  Complete/review Learning Assessment3.  Assess knowledge level of disease process/condition, treatment plan, diagnostic tests and medications4.  Explain disease process/condition, treatment plan, diagnostic tests and medications  Outcome: Progressing       Patient is not progressing towards the following goals:

## 2025-04-25 NOTE — DIETARY
Nutrition Update:    Day 1 of admit.  Tomas Jean-Baptiste is a 24 y.o. 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    Current Diet: Regular diet     Per RD screen- PO documented for one meal is <25%.   Patient requested smaller portions and supplements from nutrition representative during menu selection.     Recommendation/Plan:   RD to add small portions to Health Touch.   Recommend Ensure Plus High Protein Supplements with meals.       RD following

## 2025-04-25 NOTE — PROGRESS NOTES
4 Eyes Skin Assessment Completed by MONTSERRAT Rangel and MONTSERRAT Orosco.    Head WDL  Ears WDL  Nose WDL  Mouth WDL  Neck WDL  Breast/Chest WDL  Shoulder Blades WDL  Spine WDL  (R) Arm/Elbow/Hand WDL  (L) Arm/Elbow/Hand WDL  Abdomen WDL  Groin WDL  Scrotum/Coccyx/Buttocks WDL  (R) Leg WDL  (L) Leg WDL  (R) Heel/Foot/Toe WDL  (L) Heel/Foot/Toe WDL          Devices In Places Central Line      Interventions In Place Pillows    Possible Skin Injury No    Pictures Uploaded Into Epic N/A  Wound Consult Placed N/A  RN Wound Prevention Protocol Ordered No

## 2025-04-25 NOTE — PROGRESS NOTES
Chemotherapy Verification - SECONDARY RN       Height = 165.1 cm  Weight = 66.8 kg  BSA = 1.75 m2       Medication: blinatumomab  Dose: 28 mcg/day fixed 48 hour dose  Calculated Dose: 56 mcg over 48 hours fixed dose (ordered dose: 56 mcg 48 hour fixed dose)                             (In mg/m2, AUC, mg/kg)       I confirm that this process was performed independently.

## 2025-04-25 NOTE — CARE PLAN
Problem: Knowledge Deficit - Standard  Goal: Patient and family/care givers will demonstrate understanding of plan of care, disease process/condition, diagnostic tests and medications  Outcome: Progressing     Problem: Pain - Standard  Goal: Alleviation of pain or a reduction in pain to the patient’s comfort goal  Outcome: Progressing     The patient is Watcher - Medium risk of patient condition declining or worsening    Shift Goals  Clinical Goals: Patient will continue to tolerate chemotherapy, pain control  Patient Goals: Tolerate chemo    Progress made toward(s) clinical / shift goals:  Pt updated on POC    Patient is not progressing towards the following goals:

## 2025-04-25 NOTE — PROGRESS NOTES
Pharmacy Chemotherapy Verification Note:    Dx: relapsed B-ALL (ph+)        Protocol: Blincyto + TKI Consolidation     Blinatumomab (Blincyto) 28 mcg IV continuous infusion over 24 hrs daily on Days 1-28  Dasatinib 140 mg PO daily on Days 1-42 (or ponatinib 15 mg PO daily on Days 1-42)   - on Dasatinib 70 mg BID since induction (POM)  42-day cycle until transplant, disease progression, or unacceptable toxicity  NCCN Guidelines for ALL. V.2.2024.  Meg DUMAS et al. NEJM. 2020;383(17):1613-23.  david Quinones al. Lancet Haematol. 2023;10(1):e24-e34.    Allergies:  Amoxicillin     /71   Pulse (!) 115   Temp 36.9 °C (98.4 °F) (Oral)   Resp 18   Wt 66.8 kg (147 lb 4.3 oz)   SpO2 95%   BMI 24.51 kg/m²  Body surface area is 1.75 meters squared.    Labs from 4/24/25 reviewed - all within treatment parameters.  No lab parameters for day 2     Drug Order   (Drug name, dose, route, IV Fluid & volume, frequency, number of doses) Cycle: 1, Day 2 of 28      Previous treatment: s/p 3-Drug reinduction, D29 LP on 4/4/25     Medication = blinatumomab (Blincyto)  Base Dose = 28 mcg/day fixed dose  Calc Dose: Base Dose x 2d = 56 mcg  Final Dose = 56 mg  Route = CIVI  Fluid & Volume =  mL (+ overfill)  Admin Duration = Over 48 hrs via CADD pump @ 5 mL/hr   Days 1-28  Bag size may change to accommodate up to 7 day infusion        <10% difference, okay to treat with final dose     By my signature below, I confirm this process was performed independently with the BSA and all final chemotherapy dosing calculations congruent. I have reviewed the above chemotherapy order and that my calculation of the final dose and BSA (when applicable) corroborate those calculations of the  pharmacist.     Brandy Larsen, PharmD

## 2025-04-25 NOTE — PROGRESS NOTES
Patient should have every 4 hour vital signs and every 12 hours CRS/ICANS assessment and grading    CRS Parameter CRS Grade 0 CRS Grade 1 CRS Grade 2 CRS Grade 3 CRS Grade 4   Fever (>=38°C) No Yes Yes Yes Yes      With       Hypotension     No     No   Requiring IV fluids but not requiring vasopressors   Requiring one vasopressor with or without vasopressin     Requiring multiple vasopressors (excluding vasopressin)        And/or     Hypoxia   No   No   Requiring <= 6 L/min O2 via nasal cannula or blow-by     Requiring O2 via high-flow nasal cannula, facemask, or non-rebreather     Requiring O2 via positive pressure (eg CPAP, BiPAP, or mechanical venitlation)     CRS ndGndrndanddndend:nd nd2nd ICE Assessment:  Orientation: Orientation to month, year, hospital, city (1 point each) = 4 points total   Naming: Name 3 objects (eg clock, pen, button) (1 point each) = 3 points total  Following Commands: eg Show me two fingers or close your eyes and stick out your tongue (1 point) = 1 point total  Writing: Ability to write a standard sentence (eg Our national bird is the bald eagle) (1 point) = 1 point total  Attention: Count backwards from 100 by 10 (1 point) = 1 point total  ICE Score: 9    Score 10: No impairment or Grade 0 ICANS  Score 7-9: Grade 1 ICANS  Score 3-6: Grade 2 ICANS  Score 0-2: Grade 3 ICANS  Score 0 due to patient unarousable or unable to perform ICE assessment:  Grade 4 ICANS

## 2025-04-25 NOTE — PROGRESS NOTES
Chemotherapy Verification - PRIMARY RN      Height = 165.1 cm  Weight = 66.8 kg  BSA = 1.75 m2       Medication: Blinatumomab  Dose: 28 mcg/day in a 48 hour bag  Calculated Dose: 56 mcg in 48 hour dose                             (Set dose)       I confirm this process was performed independently with the BSA and all final chemotherapy dosing calculations congruent.  Any discrepancies of 10% or greater have been addressed with the chemotherapy pharmacist. The resolution of the discrepancy has been documented in the EPIC progress notes.

## 2025-04-26 LAB
ABO GROUP BLD: NORMAL
ALBUMIN SERPL BCP-MCNC: 3 G/DL (ref 3.2–4.9)
ALBUMIN/GLOB SERPL: 0.9 G/DL
ALP SERPL-CCNC: 60 U/L (ref 30–99)
ALT SERPL-CCNC: 54 U/L (ref 2–50)
ANION GAP SERPL CALC-SCNC: 12 MMOL/L (ref 7–16)
AST SERPL-CCNC: 29 U/L (ref 12–45)
BACTERIA BLD CULT: NORMAL
BARCODED ABORH UBTYP: 5100
BARCODED PRD CODE UBPRD: NORMAL
BARCODED UNIT NUM UBUNT: NORMAL
BASOPHILS # BLD AUTO: 0.2 % (ref 0–1.8)
BASOPHILS # BLD: 0.01 K/UL (ref 0–0.12)
BILIRUB SERPL-MCNC: <0.2 MG/DL (ref 0.1–1.5)
BLD GP AB SCN SERPL QL: NORMAL
BUN SERPL-MCNC: 7 MG/DL (ref 8–22)
CALCIUM ALBUM COR SERPL-MCNC: 9.2 MG/DL (ref 8.5–10.5)
CALCIUM SERPL-MCNC: 8.4 MG/DL (ref 8.5–10.5)
CHLORIDE SERPL-SCNC: 102 MMOL/L (ref 96–112)
CO2 SERPL-SCNC: 23 MMOL/L (ref 20–33)
COMPONENT R 8504R: NORMAL
CREAT SERPL-MCNC: 0.6 MG/DL (ref 0.5–1.4)
EOSINOPHIL # BLD AUTO: 0 K/UL (ref 0–0.51)
EOSINOPHIL NFR BLD: 0 % (ref 0–6.9)
ERYTHROCYTE [DISTWIDTH] IN BLOOD BY AUTOMATED COUNT: 62.8 FL (ref 35.9–50)
GFR SERPLBLD CREATININE-BSD FMLA CKD-EPI: 139 ML/MIN/1.73 M 2
GLOBULIN SER CALC-MCNC: 3.2 G/DL (ref 1.9–3.5)
GLUCOSE SERPL-MCNC: 87 MG/DL (ref 65–99)
HCT VFR BLD AUTO: 21.3 % (ref 42–52)
HGB BLD-MCNC: 6.4 G/DL (ref 14–18)
IMM GRANULOCYTES # BLD AUTO: 0.03 K/UL (ref 0–0.11)
IMM GRANULOCYTES NFR BLD AUTO: 0.6 % (ref 0–0.9)
LYMPHOCYTES # BLD AUTO: 0.92 K/UL (ref 1–4.8)
LYMPHOCYTES NFR BLD: 19.7 % (ref 22–41)
MCH RBC QN AUTO: 28.8 PG (ref 27–33)
MCHC RBC AUTO-ENTMCNC: 30 G/DL (ref 32.3–36.5)
MCV RBC AUTO: 95.9 FL (ref 81.4–97.8)
MONOCYTES # BLD AUTO: 0.48 K/UL (ref 0–0.85)
MONOCYTES NFR BLD AUTO: 10.3 % (ref 0–13.4)
NEUTROPHILS # BLD AUTO: 3.24 K/UL (ref 1.82–7.42)
NEUTROPHILS NFR BLD: 69.2 % (ref 44–72)
NRBC # BLD AUTO: 0.03 K/UL
NRBC BLD-RTO: 0.6 /100 WBC (ref 0–0.2)
PLATELET # BLD AUTO: 436 K/UL (ref 164–446)
PMV BLD AUTO: 9.2 FL (ref 9–12.9)
POTASSIUM SERPL-SCNC: 3.6 MMOL/L (ref 3.6–5.5)
PRODUCT TYPE UPROD: NORMAL
PROT SERPL-MCNC: 6.2 G/DL (ref 6–8.2)
RBC # BLD AUTO: 2.22 M/UL (ref 4.7–6.1)
RH BLD: NORMAL
SIGNIFICANT IND 70042: NORMAL
SITE SITE: NORMAL
SODIUM SERPL-SCNC: 137 MMOL/L (ref 135–145)
SOURCE SOURCE: NORMAL
UNIT STATUS USTAT: NORMAL
WBC # BLD AUTO: 4.7 K/UL (ref 4.8–10.8)

## 2025-04-26 PROCEDURE — 99233 SBSQ HOSP IP/OBS HIGH 50: CPT | Performed by: PEDIATRICS

## 2025-04-26 PROCEDURE — A9270 NON-COVERED ITEM OR SERVICE: HCPCS | Performed by: PEDIATRICS

## 2025-04-26 PROCEDURE — 700111 HCHG RX REV CODE 636 W/ 250 OVERRIDE (IP): Mod: JZ | Performed by: PEDIATRICS

## 2025-04-26 PROCEDURE — 85025 COMPLETE CBC W/AUTO DIFF WBC: CPT

## 2025-04-26 PROCEDURE — 87040 BLOOD CULTURE FOR BACTERIA: CPT

## 2025-04-26 PROCEDURE — 770004 HCHG ROOM/CARE - ONCOLOGY PRIVATE *

## 2025-04-26 PROCEDURE — 86850 RBC ANTIBODY SCREEN: CPT

## 2025-04-26 PROCEDURE — 86923 COMPATIBILITY TEST ELECTRIC: CPT

## 2025-04-26 PROCEDURE — 86945 BLOOD PRODUCT/IRRADIATION: CPT

## 2025-04-26 PROCEDURE — 86901 BLOOD TYPING SEROLOGIC RH(D): CPT

## 2025-04-26 PROCEDURE — 36415 COLL VENOUS BLD VENIPUNCTURE: CPT

## 2025-04-26 PROCEDURE — 80053 COMPREHEN METABOLIC PANEL: CPT

## 2025-04-26 PROCEDURE — 36430 TRANSFUSION BLD/BLD COMPNT: CPT

## 2025-04-26 PROCEDURE — 86900 BLOOD TYPING SEROLOGIC ABO: CPT

## 2025-04-26 PROCEDURE — P9016 RBC LEUKOCYTES REDUCED: HCPCS

## 2025-04-26 PROCEDURE — 700105 HCHG RX REV CODE 258: Performed by: PEDIATRICS

## 2025-04-26 PROCEDURE — 700102 HCHG RX REV CODE 250 W/ 637 OVERRIDE(OP): Performed by: PEDIATRICS

## 2025-04-26 RX ORDER — SODIUM CHLORIDE 9 MG/ML
1000 INJECTION, SOLUTION INTRAVENOUS ONCE
Status: COMPLETED | OUTPATIENT
Start: 2025-04-26 | End: 2025-04-26

## 2025-04-26 RX ORDER — HYDROCORTISONE SODIUM SUCCINATE 100 MG/2ML
100 INJECTION INTRAMUSCULAR; INTRAVENOUS EVERY 8 HOURS
Status: DISCONTINUED | OUTPATIENT
Start: 2025-04-26 | End: 2025-04-27

## 2025-04-26 RX ORDER — SODIUM CHLORIDE 9 MG/ML
1000 INJECTION, SOLUTION INTRAVENOUS ONCE
Status: ACTIVE | OUTPATIENT
Start: 2025-04-26 | End: 2025-04-27

## 2025-04-26 RX ADMIN — SULFAMETHOXAZOLE AND TRIMETHOPRIM 1 TABLET: 800; 160 TABLET ORAL at 05:04

## 2025-04-26 RX ADMIN — ANTACID TABLETS 1500 MG: 500 TABLET, CHEWABLE ORAL at 17:16

## 2025-04-26 RX ADMIN — HYDROCODONE BITARTRATE AND ACETAMINOPHEN 1 TABLET: 5; 325 TABLET ORAL at 17:29

## 2025-04-26 RX ADMIN — CEFTRIAXONE SODIUM 2000 MG: 10 INJECTION, POWDER, FOR SOLUTION INTRAVENOUS at 05:04

## 2025-04-26 RX ADMIN — CEFEPIME 2 G: 2 INJECTION, POWDER, FOR SOLUTION INTRAVENOUS at 22:14

## 2025-04-26 RX ADMIN — ANTACID TABLETS 1500 MG: 500 TABLET, CHEWABLE ORAL at 08:24

## 2025-04-26 RX ADMIN — BLINATUMOMAB 56 MCG: KIT INTRAVENOUS at 13:26

## 2025-04-26 RX ADMIN — HYDROCORTISONE SODIUM SUCCINATE 100 MG: 100 INJECTION, POWDER, FOR SOLUTION INTRAMUSCULAR; INTRAVENOUS at 22:05

## 2025-04-26 RX ADMIN — HYDROCORTISONE SODIUM SUCCINATE 100 MG: 100 INJECTION, POWDER, FOR SOLUTION INTRAMUSCULAR; INTRAVENOUS at 11:03

## 2025-04-26 RX ADMIN — CYPROHEPTADINE HYDROCHLORIDE 4 MG: 4 TABLET ORAL at 22:14

## 2025-04-26 RX ADMIN — CEFEPIME 2 G: 2 INJECTION, POWDER, FOR SOLUTION INTRAVENOUS at 12:36

## 2025-04-26 RX ADMIN — SULFAMETHOXAZOLE AND TRIMETHOPRIM 1 TABLET: 800; 160 TABLET ORAL at 17:16

## 2025-04-26 RX ADMIN — DASATINIB 70 MG: 70 TABLET ORAL at 17:17

## 2025-04-26 RX ADMIN — SODIUM CHLORIDE 1000 ML: 9 INJECTION, SOLUTION INTRAVENOUS at 08:34

## 2025-04-26 RX ADMIN — ACETAMINOPHEN 650 MG: 325 TABLET ORAL at 08:43

## 2025-04-26 ASSESSMENT — PAIN DESCRIPTION - PAIN TYPE
TYPE: ACUTE PAIN

## 2025-04-26 NOTE — CODE DOCUMENTATION
MD at bedside- Will finish fluid bolus in process, administer 1U PRBC and re-evaluate.  BP improving with fluid bolus.

## 2025-04-26 NOTE — PROGRESS NOTES
Patient should have every 4 hour vital signs and every 12 hours CRS/ICANS assessment and grading    CRS Parameter CRS Grade 0 CRS Grade 1 CRS Grade 2 CRS Grade 3 CRS Grade 4   Fever (>=38°C) No Yes Yes Yes Yes      With       Hypotension     No     No   Requiring IV fluids but not requiring vasopressors   Requiring one vasopressor with or without vasopressin     Requiring multiple vasopressors (excluding vasopressin)        And/or     Hypoxia   No   No   Requiring <= 6 L/min O2 via nasal cannula or blow-by     Requiring O2 via high-flow nasal cannula, facemask, or non-rebreather     Requiring O2 via positive pressure (eg CPAP, BiPAP, or mechanical venitlation)     CRS ndGndrndanddndend:nd nd2nd ICE Assessment:  Orientation: Orientation to month, year, hospital, city (1 point each) = 4 points total   Naming: Name 3 objects (eg clock, pen, button) (1 point each) = 3 points total  Following Commands: eg Show me two fingers or close your eyes and stick out your tongue (1 point) = 1 point total  Writing: Ability to write a standard sentence (eg Our national bird is the bald eagle) (1 point) = 1 point total  Attention: Count backwards from 100 by 10 (1 point) = 1 point total  ICE Score: 10    Score 10: No impairment or Grade 0 ICANS  Score 7-9: Grade 1 ICANS  Score 3-6: Grade 2 ICANS  Score 0-2: Grade 3 ICANS  Score 0 due to patient unarousable or unable to perform ICE assessment:  Grade 4 ICANS

## 2025-04-26 NOTE — PROGRESS NOTES
Patient should have every 4 hour vital signs and every 12 hours CRS/ICANS assessment and grading    CRS Parameter CRS Grade 0 CRS Grade 1 CRS Grade 2 CRS Grade 3 CRS Grade 4   Fever (>=38°C) No Yes Yes Yes Yes      With       Hypotension     No     No   Requiring IV fluids but not requiring vasopressors   Requiring one vasopressor with or without vasopressin     Requiring multiple vasopressors (excluding vasopressin)        And/or     Hypoxia   No   No   Requiring <= 6 L/min O2 via nasal cannula or blow-by     Requiring O2 via high-flow nasal cannula, facemask, or non-rebreather     Requiring O2 via positive pressure (eg CPAP, BiPAP, or mechanical venitlation)     CRS stGstrstastdstest:st st1st but BP low, on 1 L02     ICE Assessment:  Orientation: Orientation to month, year, hospital, city (1 point each) = 3 points total disoriented to month  Naming: Name 3 objects (eg clock, pen, button) (1 point each) = 3 points total  Following Commands: eg Show me two fingers or close your eyes and stick out your tongue (1 point) = 1 point total  Writing: Ability to write a standard sentence (eg Our national bird is the bald eagle) (1 point) = 1 point total  Attention: Count backwards from 100 by 10 (1 point) = 1 point total  ICE Score: 9    Score 10: No impairment or Grade 0 ICANS  Score 7-9: Grade 1 ICANS  Score 3-6: Grade 2 ICANS  Score 0-2: Grade 3 ICANS  Score 0 due to patient unarousable or unable to perform ICE assessment:  Grade 4 ICANS     MD notified

## 2025-04-26 NOTE — H&P
Pediatric Hematology/Oncology   Progress Note    Patient Name:  Tomas Jean-Baptiste  : 2000   MRN: [id number]    Location of Service: [facility]  Date of Service: 2025  Time: 10:00 AM    Primary Care Physician: Margarito Arvizu M.D.    Protocol / Therapy Plan: Individualized Immunotherapy to Bridge to Transplant, Blinatumomab (+Dasatinib) Block 1, Day 2    OVERNIGHT:     Febrile overnight prior to/at the time of blinatumomab initiation.  Treated with Tylenol.  Blood culture drawn (but lost in lab).  Ceftriaxone given. Tomas Roy reports that his thinking is cloudy, but he does not have any other neurologic complaints.  Tomas Roy denies headache or changes in vision.  No complaints of any nausea or vomiting.  No abdominal pain.  Reports that surprisingly, Tomas Roy reports that his thigh mass has improved and is no longer painful.  Does complain of some mild back pain this AM.  No pain with defecation.  No other concerns or complaints.        Review of Systems:     Constitutional: Febrile.  No signs or symptoms of illness.  Energy and activity are at baseline.  HENT: Negative for auditory changes, nosebleeds and sore throat.  No mouth sores.  Eyes: Negative for visual changes.  Respiratory: Negative for shortness of breath or cough.  Cardiovascular: Negative.  Gastrointestinal: Negative for nausea, vomiting, abdominal pain, diarrhea, constipation.  Genitourinary: Negative for dysuria and flank pain.    Musculoskeletal: Some back pain.  Skin: Negative for rash, signs of infection.  Neurological: Negative for numbness, tingling, sensory changes, weakness or headaches.    Endo/Heme/Allergies: Does not bruise/bleed easily.    Psychiatric/Behavioral: No changes in mood, appropriate for age.     PAST MEDICAL HISTORY:     Oncologic History:  2-3 week history of worsening fatigue, right lower extremity pain  Presentation to OSH and diagnosed with right LE superficial thrombus,  subsegmental PE and hyperleukocytosis, anemia and thrombocytopenia  Transferred to [facility] for definitive care  Presenting (local) WBC > 440K, Hgb 10.0, platelets 53, (automated differential ANC 3190, ALC 75,310, absolute monocyte count 51220, absolute blast count 340,560)  Uric Acid 15.6, phosphorus 5.6, LDH 1114  Rasburicase x 1 dose given   Peripheral Blood flow cytometry demonstrating CD10 pos, CD19 pos, CD20 neg, CD22 dim (60%) 5/28/2022  Peripheral blood FISH for BCR-ABL1 positive in 98% of analyzed cells     Age at Diagnosis: 21 years  White Blood Cell Count at Presentation: > 440 k/uL  Testicular Disease Status: Negative (see procedure note 5/30/2022)  CNS Status: 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, diagnostic LP with WBC 1, RBC 3 and no evidence of leukemic blasts 5/30/2022  Steroid Pre-treatment: None  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     All inclusion/exclusion criteria for [id number] met and consent signed - enrolled 5/29/2022   All inclusion/exclusion criteria for [id number] met and consent signed - enrolled 5/30/2022  Confirmatory bone marrow aspirate and biopsy and diagnostic LP + cytarabine 70 mg IT 5/30/2022  Induction therapy (ON STUDY [id number]) started 5/30/2022  Bone marrow immunohistochemistry consistent with diagnosis of B-ALL comprising 90% of marrow cellularity  Bone marrow sample sent to [facility] for COG purposes:  Flow cytom  Of the blood pressure little high that is a problem is a cultural problem is well and cultural genetic and everything else like that unfortunately breathalyzers such bad heart disease diabetes things like that  populations etry consistent with peripheral blood, cytogenetics remarkable for known t(9;22)  CSF with WBC 1, RBC 3, no blasts identified on cytospin  FISH results available 5/31/2022 making patient eligible for transfer from [place] [address] to [place] [address] as eligibility  requirements were met for [id number]  Patient unenrolled from [place] [address] (BCR-ABL1 fusion positive) 6/1/2022  Consent signed for [place] [address] and patient enrolled 6/1/2022 (see eligibility checklist from 5/31/2022 and consent note from 6/1/2022)  Imatinib 400 mg PO QAM / 200 mg PO QPM started 6/3/2022 (allowed per [place] [address])  Patient completed the first 15 days of a Standard 4-drug Induction on 6/13/2022  Start of [facility](OS), Induction IA Part 2, Day 15 6/13/2022  End of Induction 1A Part 2 - MRD negative  Start of [facility]16[facility](OS), Induction IA Part 2, Day 15 7/5/2022  Induction IB DELAYED 2 weeks 14 days from 7/26/2022-8/9/2022) for myelosuppression - Start of Day 22 cytarabine block 8/9/2022  Induction IB Day 42 delayed 9 days for myelosuppression - Day 42 evaluations 9/7/2022  End of Induction IB - Flow cytometric MRD negative, MRD by IgH-TCR PCR 00.1667560%  Randomization to AR-Experimental Arm B of [id number]  Start of AR-Experimental Arm B, Interim Maintenance 9/29/2022  Weight based increase in dose of imatinib to 400 mg PO AM and 250 mg PM 9/29/2022  Thrombocytopenia not permissive of proceeding with Day 15 of Interim Maintenance  AR-Experimental Arm B, Interim Maintenance, Day 15 delayed 4 days, start 10/17/2022  AR-Experimental Arm B, Interim Maintenance, Day 29, start 11/1/2022  AR-Experimental Arm B, Interim Maintenance, Day 43, start 11/14/2022  Last does of 6-MP 11/27/2022  AR-Experimental Arm B, Delayed Intensification, Day 1, start 12/6/2022  Admission with Severe Septic Shock 12/27/2022  Imatinib HELD 12/27/2022-1/8/2023  AR-Experimental Arm B, Delayed Intensification, Day 29 DELAYED 14 days with start 1/17/2023  Hospitalization 2/7/2023 on Day 50 of Delayed Intensification with left shoulder pain ultimately diagnosed with Bacteroides fragilis infection  AR-Experimental Arm B, Interim Maintenance, Day 1 DELAYED 7 days with start 2/27/2023  AR-Experimental Arm B, Interim Maintenance, Day 11 DELAYED 4  "days for platelets 43K on 3/9/2023  AR-Experimental Arm B, Interim Maintenance, Day 11 VCR given and MTX omitted for platelets 43K 3/13/2023  Imatinib HELD 3/30/2023-4/11/2023  AR-Experimental Arm B, Interim Maintenance, Day 21 (DELAYED 22 DAYS) - administered 4/11/2023 with methotrexate 80 mg/m² IV  AR-Experimental Arm B, Interim Maintenance, Day 31 (\"true\" Day 31 4/25/2023) - VCR and IT MTX given 4/28/2023 - IV MTX omitted  AR-Experimental Arm B, Interim Maintenance, Day 41 met with counts - administered 5/5/2023 with methotrexate 80 mg/m² IV     Start of Maintenance, Cycle 1, Day 1 -5/22/2023  Cranial radiation 6/5/2023-6/16/2023 (10 fractions)  Maintenance, Cycle 1, Day 29 - 6/23/2023 (no IT MTX given)  Maintenance, Cycle 1, Day 67, presented with fatigue nausea and vomiting found to have ANC less than 500  Methotrexate (oral) and mercaptopurine held 7/27/2023 - continued with imatinib  Methotrexate (oral) and mercaptopurine held 8/2/2023 - continued with imatinib  Methotrexate (oral) and mercaptopurine held 8/7/2023 - continued with imatinib  Restarted methotrexate (oral) and mercaptopurine at 100% previous dosing on 8/11/2023 - continued with imatinib  Maintenance, Cycle 2, Day 1 - 8/14/2023  Maintenance, Cycle 2, Day 29 - 9/11/2023  Methotrexate (oral) and mercaptopurine held 9/11/2023 - continued with imatinib  Methotrexate (oral) and mercaptopurine held 9/19/2023 - continued with imatinib  Methotrexate (oral) and mercaptopurine held 9/26/2023 - HELD imatinib  Methotrexate (oral) restarted at 50% dosing and mercaptopurine restarted at 50% dosing 9/29/2023 - RESTARTED imatinib  Maintenance, Cycle 2, Day 57 - 10/9/2023  Maintenance, Cycle 3, Day 1 - 11/6/2023: Methotrexate (oral) increased to 75% dosing and mercaptopurine increased to 75% dosing.  Imatinib increased to 600 mg QAM 11/6/2023  Maintenance Cycle 3, Day 29: 12/4/2023 No changes made to the dosing of oral chemotherapy  Maintenance, Cycle 4, Day 1: " No dose adjustments made however ANC slightly greater than >1500.  Oral chemotherapy did not need weight adjustment.  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     Imatinib discontinued, dasatanib started 3/22/2025    End of Re-Induction 4/4/2025 demonstrating suspicious population of phenotypically atypical cells at 0.005%  BCR-ABL RT-PCR 4/4/2025 detectable at 0.734305%    Re[facility] 4/15/2025 MRD negative  BCR-ABL RT-PCR 4/15/2025 undetectable    Blinatumomab Block 1 4/24/2025     Past Medical History:    1) Previously Healthy  2) 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  3) Hyperleukocytosis  4) Hyperuricemia  5) Hyperphosphatemia  6) Right Lower Extremity Superficial Thrombus  7) Subsegmental Pulmonary Embolism  8) 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  9) Bacteroides fragilis soft tissue infection left shoulder  10) Anxiety/Depression     Past Surgical History:     1) Temporary Right IJ Pharesis Catheter Placement 5/28/2022  2) Right-sided Port-A-Cath placement 8/29/2022  3) IR drainage left calf hematoma  4) Left shoulder I&D  5) Cranial XRT 6/5/2023-6/16/2023     Birth/Developmental History:   1st of three children  Unremarkable pregnancy  Unremarkable delivery     Family History:  No significant family history of cancer.  Both maternal and paternal family history of diabetes.     Immunizations:  UTD     Social History:   Lives with girlfriend.  Graduated from college.  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  Social situation with family is fractured.    Medications:   No current facility-administered medications on file prior to encounter.     Current Outpatient  Medications on File Prior to Encounter   Medication Sig Dispense Refill    dasatinib (SPRYCEL) 70 MG tablet Take 1 Tablet by mouth 2 times a day. 60 Tablet 4    vitamin D (CHOLECALCIFEROL) 1000 UNIT Tab Take 1 Tablet by mouth every day. 60 Tablet 2    LORazepam (ATIVAN) 1 MG Tab Take 1 Tablet by mouth at bedtime as needed (Nausea and Vomiting) for up to 180 days. 30 Tablet 5    senna-docusate (SENNA-S) 8.6-50 MG Tab Take 1 Tablet by mouth every day. 30 Tablet 0    ondansetron (ZOFRAN ODT) 4 MG TABLET DISPERSIBLE Take 2 Tablets by mouth every 6 hours as needed for Nausea/Vomiting. 20 Tablet 3    sulfamethoxazole-trimethoprim (BACTRIM DS) 800-160 MG tablet Take 1 Tablet by mouth 2 times a day. 16 Tablet 3    calcium carbonate (TUMS EX) 750 MG chewable tablet Chew 2 Tablets 3 times a day with meals. 30 Tablet 2    cyproheptadine (PERIACTIN) 4 MG Tab Take 1 Tablet by mouth 3 times a day for 30 days. 90 Tablet 0       OBJECTIVE:     Vitals:   /65   Pulse (!) 136   Temp (!) 38.2 °C (100.7 °F)   Resp 18   Wt 66.8 kg (147 lb 4.3 oz)   SpO2 93%     Labs:    Admission on 04/24/2025   Component Date Value    Sodium 04/24/2025 135     Potassium 04/24/2025 3.0 (L)     Chloride 04/24/2025 99     Co2 04/24/2025 24     Anion Gap 04/24/2025 12.0     Glucose 04/24/2025 95     Bun 04/24/2025 3 (L)     Creatinine 04/24/2025 0.52     Calcium 04/24/2025 8.6     Correct Calcium 04/24/2025 9.2     AST(SGOT) 04/24/2025 28     ALT(SGPT) 04/24/2025 29     Alkaline Phosphatase 04/24/2025 70     Total Bilirubin 04/24/2025 0.7     Albumin 04/24/2025 3.3     Total Protein 04/24/2025 7.0     Globulin 04/24/2025 3.7 (H)     A-G Ratio 04/24/2025 0.9     WBC 04/24/2025 4.8     RBC 04/24/2025 2.62 (L)     Hemoglobin 04/24/2025 7.6 (L)     Hematocrit 04/24/2025 24.0 (L)     MCV 04/24/2025 91.6     MCH 04/24/2025 29.0     MCHC 04/24/2025 31.7 (L)     RDW 04/24/2025 57.5 (H)     Platelet Count 04/24/2025 420     MPV 04/24/2025 8.7 (L)      Neutrophils-Polys 04/24/2025 71.90     Lymphocytes 04/24/2025 17.40 (L)     Monocytes 04/24/2025 9.50     Eosinophils 04/24/2025 0.20     Basophils 04/24/2025 0.20     Immature Granulocytes 04/24/2025 0.80     Nucleated RBC 04/24/2025 0.00     Neutrophils (Absolute) 04/24/2025 3.42     Lymphs (Absolute) 04/24/2025 0.83 (L)     Monos (Absolute) 04/24/2025 0.45     Eos (Absolute) 04/24/2025 0.01     Baso (Absolute) 04/24/2025 0.01     Immature Granulocytes (a* 04/24/2025 0.04     NRBC (Absolute) 04/24/2025 0.00     GFR (CKD-EPI) 04/24/2025 145         Physical Exam:    Constitutional: Well-developed, well-nourished, and in no distress.  Very well-appearing.  HENT: Normocephalic and atraumatic. No nasal congestion or rhinorrhea. Oropharynx is clear and moist. No oral ulcerations or sores.    Eyes: Conjunctivae are normal. Pupils are equal, round.  EOMI.  Nonicteric.  Glasses.  Neck: Normal range of motion of neck, no adenopathy.    Cardiovascular: Normal rate, regular rhythm and normal heart sounds.  No murmur heard. DP/radial pulses 2+, cap refill < 2 sec.  Pulmonary/Chest: Effort normal and breath sounds normal. No respiratory distress. Symmetric expansion.  No crackles or wheezes.  Abdomen: Soft. Bowel sounds are normal. No distension and no mass. There is no hepatosplenomegaly.    Genitourinary:  Deferred.  Musculoskeletal: Normal range of motion of lower and upper extremities bilaterally. No tenderness to palpation of elbows, wrists, hands, knees, ankles and feet bilaterally.   Improved size and tenderness of right medial thigh lesion.  Lymphadenopathy: No cervical adenopathy.  Neurological: Alert and oriented to person and place. Exhibits normal muscle tone bilaterally in upper and lower extremities. Gait normal. Coordination normal.    Skin: Skin is warm, dry and pink.  No rash or evidence of skin infection.  No pallor.   Psychiatric: Mood and affect normal for age.    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 Likely plan for HSCT, search for MUD ongoing. After discussing multiple options, they decided on a 3-Drug Re-Induction (VCR/PRED/PEG-ASP) +Imatinib followed by blinatumomab. 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 Met virtually with Dr. Adrian, [place] BMT 3/20/2025     - Has had discussions with both bone marrow transplant as well as some therapeutics (CAR-T) at [facility].  Complete remission after re-induction, making CAR-T ineligible  - End of Reinduction evaluations demonstrating phenotypically similar aberrant lymphoblast population of 0.005% (however lacking CD19 or CD22)   -  Repeat BM at [place] confirming remission    - Given CR2, will pursue transplantation     Individualized Bridging Therapy with Blinatumomab, Day 2:  ** Methotrexate 15 mg IT x 1 on Days 1 (COMPLETED at End of Re-Induction, see separate procedure note)  ** Dexamethasone prior to starting blinatumomab, 12 mg PO x 1  ** Blinatumomab 28 mCg per day IV continuous infusion for 28 days, to start on 2025. 24-hour infusion until 2025, 48-hour infusion on 2025 until 2025. Discharge to [facility] after three days for  for 7 days cassette change and then discharge patient home.  ** Patient will return to [facility] on 2025, 2025 and 2025 for a 7 day cassette change  ** Monitor for CRS, monitor for neurologic side effects while inpatient (tremors, seizures)      ** Continue Dasatinib 70 mg PO BID, started 3/22/2025     - Febrile yesterday (ID work-up, possible blinatumomab)      2) Pancytopenia Secondary to Leukemia and Therapy:  - WBC: 4800/microliters, Hemoglobin: 7.6 gm/dL, Platelets: 420,000/microliters on admission  - ANC  3420/microliters, A/microliters on admission  - Transfuse irradiated PRBC for Hgb<7 g/dL or symptomatic.   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 No clinical lesions  - Chlorhexidine mouthwash 4 times daily     4) History of Rothia mucilaginosa Bacteremia:              - Blood cultures obtained on presentation 3/22/2025 from CVL (both lumens) with Rothia mucilaginosa               - 3/25/25 Blood Cx negative to date              - Was placed on empiric cefepime and Flagyl upon admission               - Vancomycin started for Rothia on 3/23/2025              - Completed therapy and discontinued Cefepime, Flagyl and Vancomycin on  4/4/2025   - See nodule in thigh below     5) FEN/GI:  - Regular diet     6) Nodule, Right Medial Thigh: (IMPROVED WITH ANTIBIOTICS)  - Tender and palpable  - Monitor for fever  - Consider possible infectious etiology  - If fever, draw cultures and start empiric antibiotics  - US Soft Tissue demonstrating hypoechoic lesion possibly fat necrosis, inflammatory process or infection    7) At Risk For Nausea and Vomiting Secondary To Therapy             - Zofran changed to PRN              - Scopolamine patch every 3 days             - Ativan IV PRN for breakthrough N/V available     8) Transaminitis Secondary To Therapy: (RESOLVED)  - AST: 28 U/L, ALT: 29 U/L on presentation  - Bilirubin total 0.7 mg/dL on presentation  - Will continue to monitor     9) At Risk For Hypovitaminosis D             - Continue Vitamin D3 supplement daily     10) Central Access:  - Double-lumen CVL placed 2/28/2025 by surgery  - Saline flush per protocol     11) Psychosocial:  - Dr. Ortega following     12) Social:              - Referred to Social Work and financial navigator     Disposition: Remain inpatient for additional 24 hours.  Discharge to [facility] Sunday for blinatumomab bag change.    Pepe Faye MD  Pediatric Hematology / Oncology  [facility]  Cell.  [phone]  Office. [phone]

## 2025-04-26 NOTE — CARE PLAN
The patient is Stable - Low risk of patient condition declining or worsening    Shift Goals  Clinical Goals: Patient will continue to tolerate chemo, pain control, monitor for fevers  Patient Goals: Tolerate chemo, pain control, no fevers    Progress made toward(s) clinical / shift goals:    Problem: Pain - Standard  Goal: Alleviation of pain or a reduction in pain to the patient’s comfort goal  Description: Target End Date:  Prior to discharge or change in level of careDocument on Vitals flowsheet1.  Document pain using the appropriate pain scale per order or unit policy2.  Educate and implement non-pharmacologic comfort measures (i.e. relaxation, distraction, massage, cold/heat therapy, etc.)3.  Pain management medications as ordered4.  Reassess pain after pain med administration per policy5.  If opiods administered assess patient's response to pain medication is appropriate per POSS sedation scale6.  Follow pain management plan developed in collaboration with patient and interdisciplinary team (including palliative care or pain specialists if applicable)  Outcome: Progressing       Patient is not progressing towards the following goals:

## 2025-04-27 ENCOUNTER — APPOINTMENT (OUTPATIENT)
Dept: ONCOLOGY | Facility: MEDICAL CENTER | Age: 25
End: 2025-04-27
Attending: PEDIATRICS
Payer: COMMERCIAL

## 2025-04-27 LAB
ALBUMIN SERPL BCP-MCNC: 3.3 G/DL (ref 3.2–4.9)
ALBUMIN/GLOB SERPL: 1.1 G/DL
ALP SERPL-CCNC: 61 U/L (ref 30–99)
ALT SERPL-CCNC: 24 U/L (ref 2–50)
ANION GAP SERPL CALC-SCNC: 11 MMOL/L (ref 7–16)
ANISOCYTOSIS BLD QL SMEAR: ABNORMAL
AST SERPL-CCNC: 23 U/L (ref 12–45)
BASOPHILS # BLD AUTO: 0 % (ref 0–1.8)
BASOPHILS # BLD: 0 K/UL (ref 0–0.12)
BILIRUB SERPL-MCNC: 0.3 MG/DL (ref 0.1–1.5)
BUN SERPL-MCNC: 7 MG/DL (ref 8–22)
CALCIUM ALBUM COR SERPL-MCNC: 9.3 MG/DL (ref 8.5–10.5)
CALCIUM SERPL-MCNC: 8.7 MG/DL (ref 8.5–10.5)
CHLORIDE SERPL-SCNC: 105 MMOL/L (ref 96–112)
CO2 SERPL-SCNC: 26 MMOL/L (ref 20–33)
CREAT SERPL-MCNC: 0.4 MG/DL (ref 0.5–1.4)
EOSINOPHIL # BLD AUTO: 0 K/UL (ref 0–0.51)
EOSINOPHIL NFR BLD: 0 % (ref 0–6.9)
ERYTHROCYTE [DISTWIDTH] IN BLOOD BY AUTOMATED COUNT: 64 FL (ref 35.9–50)
GFR SERPLBLD CREATININE-BSD FMLA CKD-EPI: 157 ML/MIN/1.73 M 2
GLOBULIN SER CALC-MCNC: 3.1 G/DL (ref 1.9–3.5)
GLUCOSE SERPL-MCNC: 100 MG/DL (ref 65–99)
HCT VFR BLD AUTO: 27.2 % (ref 42–52)
HGB BLD-MCNC: 8.6 G/DL (ref 14–18)
HYPOCHROMIA BLD QL SMEAR: ABNORMAL
LYMPHOCYTES # BLD AUTO: 0.57 K/UL (ref 1–4.8)
LYMPHOCYTES NFR BLD: 8.7 % (ref 22–41)
MACROCYTES BLD QL SMEAR: ABNORMAL
MANUAL DIFF BLD: NORMAL
MCH RBC QN AUTO: 28.9 PG (ref 27–33)
MCHC RBC AUTO-ENTMCNC: 31.6 G/DL (ref 32.3–36.5)
MCV RBC AUTO: 91.3 FL (ref 81.4–97.8)
MICROCYTES BLD QL SMEAR: ABNORMAL
MONOCYTES # BLD AUTO: 0.06 K/UL (ref 0–0.85)
MONOCYTES NFR BLD AUTO: 0.9 % (ref 0–13.4)
MORPHOLOGY BLD-IMP: NORMAL
NEUTROPHILS # BLD AUTO: 5.97 K/UL (ref 1.82–7.42)
NEUTROPHILS NFR BLD: 90.4 % (ref 44–72)
NRBC # BLD AUTO: 0.04 K/UL
NRBC BLD-RTO: 0.6 /100 WBC (ref 0–0.2)
OVALOCYTES BLD QL SMEAR: NORMAL
PLATELET # BLD AUTO: 390 K/UL (ref 164–446)
PLATELET BLD QL SMEAR: NORMAL
PMV BLD AUTO: 9 FL (ref 9–12.9)
POIKILOCYTOSIS BLD QL SMEAR: NORMAL
POTASSIUM SERPL-SCNC: 4.3 MMOL/L (ref 3.6–5.5)
PROT SERPL-MCNC: 6.4 G/DL (ref 6–8.2)
RBC # BLD AUTO: 2.98 M/UL (ref 4.7–6.1)
RBC BLD AUTO: PRESENT
SODIUM SERPL-SCNC: 142 MMOL/L (ref 135–145)
WBC # BLD AUTO: 6.6 K/UL (ref 4.8–10.8)

## 2025-04-27 PROCEDURE — 700105 HCHG RX REV CODE 258: Performed by: PEDIATRICS

## 2025-04-27 PROCEDURE — 700102 HCHG RX REV CODE 250 W/ 637 OVERRIDE(OP): Performed by: PEDIATRICS

## 2025-04-27 PROCEDURE — 770004 HCHG ROOM/CARE - ONCOLOGY PRIVATE *

## 2025-04-27 PROCEDURE — 85007 BL SMEAR W/DIFF WBC COUNT: CPT

## 2025-04-27 PROCEDURE — A9270 NON-COVERED ITEM OR SERVICE: HCPCS | Performed by: PEDIATRICS

## 2025-04-27 PROCEDURE — 99233 SBSQ HOSP IP/OBS HIGH 50: CPT | Performed by: PEDIATRICS

## 2025-04-27 PROCEDURE — 80053 COMPREHEN METABOLIC PANEL: CPT

## 2025-04-27 PROCEDURE — 700111 HCHG RX REV CODE 636 W/ 250 OVERRIDE (IP): Mod: JZ | Performed by: PEDIATRICS

## 2025-04-27 PROCEDURE — 85027 COMPLETE CBC AUTOMATED: CPT

## 2025-04-27 RX ORDER — METHYLPREDNISOLONE SODIUM SUCCINATE 125 MG/2ML
100 INJECTION, POWDER, LYOPHILIZED, FOR SOLUTION INTRAMUSCULAR; INTRAVENOUS ONCE
Status: DISCONTINUED | OUTPATIENT
Start: 2025-04-27 | End: 2025-04-27

## 2025-04-27 RX ORDER — HYDROCORTISONE SODIUM SUCCINATE 100 MG/2ML
100 INJECTION INTRAMUSCULAR; INTRAVENOUS ONCE
Status: COMPLETED | OUTPATIENT
Start: 2025-04-27 | End: 2025-04-27

## 2025-04-27 RX ORDER — HYDROCORTISONE SODIUM SUCCINATE 100 MG/2ML
100 INJECTION INTRAMUSCULAR; INTRAVENOUS EVERY 12 HOURS
Status: DISCONTINUED | OUTPATIENT
Start: 2025-04-28 | End: 2025-04-28

## 2025-04-27 RX ADMIN — LORAZEPAM 1 MG: 2 LIQUID ORAL at 18:38

## 2025-04-27 RX ADMIN — CEFEPIME 2 G: 2 INJECTION, POWDER, FOR SOLUTION INTRAVENOUS at 14:01

## 2025-04-27 RX ADMIN — ANTACID TABLETS 1500 MG: 500 TABLET, CHEWABLE ORAL at 17:10

## 2025-04-27 RX ADMIN — CYPROHEPTADINE HYDROCHLORIDE 4 MG: 4 TABLET ORAL at 14:01

## 2025-04-27 RX ADMIN — BLINATUMOMAB 28 MCG: KIT INTRAVENOUS at 17:18

## 2025-04-27 RX ADMIN — CEFEPIME 2 G: 2 INJECTION, POWDER, FOR SOLUTION INTRAVENOUS at 22:09

## 2025-04-27 RX ADMIN — ANTACID TABLETS 1500 MG: 500 TABLET, CHEWABLE ORAL at 12:15

## 2025-04-27 RX ADMIN — CEFEPIME 2 G: 2 INJECTION, POWDER, FOR SOLUTION INTRAVENOUS at 05:35

## 2025-04-27 RX ADMIN — ANTACID TABLETS 1500 MG: 500 TABLET, CHEWABLE ORAL at 10:10

## 2025-04-27 RX ADMIN — DASATINIB 70 MG: 70 TABLET ORAL at 05:37

## 2025-04-27 RX ADMIN — HYDROCORTISONE SODIUM SUCCINATE 100 MG: 100 INJECTION, POWDER, FOR SOLUTION INTRAMUSCULAR; INTRAVENOUS at 18:31

## 2025-04-27 RX ADMIN — SULFAMETHOXAZOLE AND TRIMETHOPRIM 1 TABLET: 800; 160 TABLET ORAL at 05:36

## 2025-04-27 RX ADMIN — SULFAMETHOXAZOLE AND TRIMETHOPRIM 1 TABLET: 800; 160 TABLET ORAL at 17:10

## 2025-04-27 RX ADMIN — HYDROCORTISONE SODIUM SUCCINATE 100 MG: 100 INJECTION, POWDER, FOR SOLUTION INTRAMUSCULAR; INTRAVENOUS at 05:34

## 2025-04-27 RX ADMIN — DASATINIB 70 MG: 70 TABLET ORAL at 17:10

## 2025-04-27 RX ADMIN — CYPROHEPTADINE HYDROCHLORIDE 4 MG: 4 TABLET ORAL at 22:09

## 2025-04-27 RX ADMIN — Medication 1000 UNITS: at 05:36

## 2025-04-27 ASSESSMENT — PAIN DESCRIPTION - PAIN TYPE
TYPE: ACUTE PAIN
TYPE: ACUTE PAIN

## 2025-04-27 NOTE — H&P
Pediatric Hematology/Oncology   Progress Note    Patient Name:  Tomas Jean-Baptiste  : 2000   MRN: [id number]    Location of Service: [facility]  Date of Service: 2025  Time: 10:00 AM    Primary Care Physician: Margarito Arvizu M.D.    Protocol / Therapy Plan: Individualized Immunotherapy to Bridge to Transplant, Blinatumomab (+Dasatinib) Block 1, Day 4    OVERNIGHT:     Yesterday in the a.m., had significant issues with maintaining blood pressure and required 2 fluid boluses as well as a unit of blood in addition to hydrocortisone stress dose steroids.  Following interventions, Tomas Roy maintain his blood pressure the remainder of the day, evening and through the night.  This morning, he feels quite well per report.  He denies any lightheadedness, changes in vision or neurologic changes.  His mental status is back to baseline without any confusion.  He does not have any dizziness or lightheadedness. Tomas Roy denies any fatigue.  He feels energized this morning.  There are no complaints of any headaches.  No nausea, vomiting, diarrhea or constipation. Tomas Roy continues to report some bilateral lower back pain however this is much improved this morning.  No complaints of any skin changes or rashes.  No additional fevers.  No other concerns or complaints at this time.    Review of Systems:     Constitutional: Afebrile Tmax 99.8 °F much improved clinically this morning.  Improved energy.  Improved activity.  HENT: Negative for auditory changes, nosebleeds and sore throat.  No mouth sores.  Eyes: Negative for visual changes.  Respiratory: No additional hypoxemia.  Saturating well on room air.  Cardiovascular: Negative.  Gastrointestinal: Negative for nausea, vomiting, abdominal pain, diarrhea, constipation.  Genitourinary: Negative.  Musculoskeletal: Continues to complain of lower back pain although this is greatly improved.  Skin: Negative for rash, signs of  infection.  Neurological: Improved tremor.  Endo/Heme/Allergies: Does not bruise/bleed easily.    Psychiatric/Behavioral: No changes in mood, appropriate for age.   OBJECTIVE:     Tmax:  Temp (24hrs), Av.7 °C (98.1 °F), Min:36.4 °C (97.6 °F), Max:37.7 °C (99.8 °F)    Vitals:   /66   Pulse 78   Temp 36.7 °C (98 °F) (Temporal)   Resp 16   Wt 66.8 kg (147 lb 4.3 oz)   SpO2 98%     Labs:   Latest Reference Range & Units 25 10:15   WBC 4.8 - 10.8 K/uL 6.6   RBC 4.70 - 6.10 M/uL 2.98 (L)   Hemoglobin 14.0 - 18.0 g/dL 8.6 (L)   Hematocrit 42.0 - 52.0 % 27.2 (L)   MCV 81.4 - 97.8 fL 91.3   MCH 27.0 - 33.0 pg 28.9   MCHC 32.3 - 36.5 g/dL 31.6 (L)   RDW 35.9 - 50.0 fL 64.0 (H)   Platelet Count 164 - 446 K/uL 390   MPV 9.0 - 12.9 fL 9.0   Neutrophils-Polys 44.00 - 72.00 % 90.40 (H)   Neutrophils (Absolute) 1.82 - 7.42 K/uL 5.97   Lymphocytes 22.00 - 41.00 % 8.70 (L)   Lymphs (Absolute) 1.00 - 4.80 K/uL 0.57 (L)   Monocytes 0.00 - 13.40 % 0.90   Monos (Absolute) 0.00 - 0.85 K/uL 0.06   Eosinophils 0.00 - 6.90 % 0.00   Eos (Absolute) 0.00 - 0.51 K/uL 0.00   Basophils 0.00 - 1.80 % 0.00   Baso (Absolute) 0.00 - 0.12 K/uL 0.00   Nucleated RBC 0.00 - 0.20 /100 WBC 0.60 (H)   NRBC (Absolute) K/uL 0.04   Plt Estimation  Normal   RBC Morphology  Present   Hypochromia  1+   Anisocytosis  1+   Macrocytosis  1+   Microcytosis  1+   Poikilocytosis  1+   Ovalocytes  1+   Peripheral Smear Review  see below   Manual Diff Status  PERFORMED   Sodium 135 - 145 mmol/L 142   Potassium 3.6 - 5.5 mmol/L 4.3   Chloride 96 - 112 mmol/L 105   Co2 20 - 33 mmol/L 26   Anion Gap 7.0 - 16.0  11.0   Glucose 65 - 99 mg/dL 100 (H)   Bun 8 - 22 mg/dL 7 (L)   Creatinine 0.50 - 1.40 mg/dL 0.40 (L)   GFR (CKD-EPI) >60 mL/min/1.73 m 2 157   Calcium 8.5 - 10.5 mg/dL 8.7   Correct Calcium 8.5 - 10.5 mg/dL 9.3   AST(SGOT) 12 - 45 U/L 23   ALT(SGPT) 2 - 50 U/L 24   Alkaline Phosphatase 30 - 99 U/L 61   Total Bilirubin 0.1 - 1.5 mg/dL 0.3    Albumin 3.2 - 4.9 g/dL 3.3   Total Protein 6.0 - 8.2 g/dL 6.4   Globulin 1.9 - 3.5 g/dL 3.1   A-G Ratio g/dL 1.1   (L): Data is abnormally low  (H): Data is abnormally high  Physical Exam:    Constitutional: Well-developed, well-nourished, and in no distress.  Well-appearing.  Much improved from yesterday in the late morning.  HENT: Normocephalic and atraumatic. No nasal congestion or rhinorrhea. Oropharynx is clear and moist. No oral ulcerations or sores.    Eyes: Conjunctivae are normal. Pupils are equal, round.  EOMI.  Nonicteric.  Glasses.  Neck: Normal range of motion of neck, no adenopathy.    Cardiovascular: Normal rate, regular rhythm and normal heart sounds.  No murmur heard. DP/radial pulses 2+, cap refill < 2 sec.  Pulmonary/Chest: Effort normal and breath sounds normal. No respiratory distress. Symmetric expansion.  No crackles or wheezes.  Abdomen: Soft. Bowel sounds are normal. No distension and no mass. There is no hepatosplenomegaly.    Genitourinary:  Deferred.  Musculoskeletal: Normal range of motion of lower and upper extremities bilaterally. No tenderness to palpation of elbows, wrists, hands, knees, ankles and feet bilaterally.  Slightly improved right medial thigh lesion..  lymphadenopathy: No cervical adenopathy.  Neurological: Alert and oriented to person and place. Exhibits normal muscle tone bilaterally in upper and lower extremities. Gait not assessed. Coordination normal.    Skin: Skin is warm, dry and pink.  No rash or evidence of skin infection.  No pallor.   Psychiatric: Mood and affect normal for age.    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 Likely plan for HSCT, search for MUD ongoing. After discussing multiple options, they decided on a 3-Drug Re-Induction (VCR/PRED/PEG-ASP) +Imatinib followed by blinatumomab. 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 Met virtually with Dr. Adrian, [facility] 3/20/2025     - Has had discussions with both bone marrow transplant as well as some therapeutics (CAR[facility]) at [facility].  Complete remission after re-induction, making CAR-T ineligible  - End of Reinduction evaluations demonstrating phenotypically similar aberrant lymphoblast population of 0.005% (however lacking CD19 or CD22)   - Repeat BM at [facility] confirming remission    - Given CR2, will pursue transplantation     Individualized Bridging Therapy with Blinatumomab, Day 4:  ** Methotrexate 15 mg IT x 1 on Days 1 (COMPLETED at End of Re-Induction, see separate procedure note)  ** Dexamethasone prior to starting blinatumomab, 12 mg PO x 1 (COMPLETED)  **  Blinatumomab 28 mCg per day IV continuous infusion for 28 days, started on 2025. 24-hour infusion until 2025, -as patient is hospitalized with complications yesterday and no definitive discharge date, will continue with 24-hour blinatumomab bags until discharge.    ** [facility] schedule TBD  ** Monitor for CRS, monitor for neurologic side effects while inpatient (tremors, seizures)      ** Continue Dasatinib 70 mg PO BID, started 3/22/2025     2) Pancytopenia Secondary to Leukemia and Therapy:  - WBC: 6600/microliters, Hemoglobin: 8.6 gm/dL following transfusion, platelets 390,000/microliters   - ANC 5970/microliters, A/microliters   - Transfuse irradiated PRBC for Hgb<7 g/dL or symptomatic.   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 No clinical lesions  - Chlorhexidine mouthwash 4 times daily     4) History of Rothia mucilaginosa Bacteremia:              - Blood cultures obtained on presentation 3/22/2025 from CVL (both lumens) with Rothia mucilaginosa               - 3/25/25 Blood Cx negative to date              - Was placed on empiric cefepime and Flagyl upon admission               - Vancomycin started for Rothia on 3/23/2025              - Completed therapy and discontinued Cefepime, Flagyl and Vancomycin on 2025   - See nodule in thigh below     5) FEN/GI:  - Regular diet     6) Nodule, Right Medial Thigh: (IMPROVED WITH ANTIBIOTICS)  - Tender and palpable  - Monitor for fever  - Consider possible infectious etiology  - If fever, draw cultures and start empiric antibiotics  - US Soft Tissue demonstrating hypoechoic lesion possibly fat necrosis, inflammatory process or infection    7) At Risk For Nausea and Vomiting Secondary To Therapy             - Zofran  changed to PRN              - Scopolamine patch every 3 days             - Ativan IV PRN for breakthrough N/V available     8) Transaminitis Secondary To Therapy: (RESOLVED)  - AST: 54 U/L, ALT: 29 U/L on presentation  - Bilirubin total 0.7 mg/dL on presentation  - Will continue to monitor     9) At Risk For Hypovitaminosis D:             - Continue Vitamin D3 supplement daily     10) Central Access:  - Double-lumen CVL placed 2/28/2025 by surgery  - Saline flush per protocol     11) Psychosocial:  - Dr. Ortega following     12) Social:              - Referred to Social Work and financial navigator     Disposition: Remain inpatient for additional 24 hours.  Discharge to [facility] when clinically improved. blinatumomab bag change.    Pepe Faye MD  Pediatric Hematology / Oncology  [facility]  Cell.  [phone]  Office. [phone]

## 2025-04-27 NOTE — PROGRESS NOTES
Patient should have every 4 hour vital signs and every 12 hours CRS/ICANS assessment and grading    CRS Parameter CRS Grade 0 CRS Grade 1 CRS Grade 2 CRS Grade 3 CRS Grade 4   Fever (>=38°C) No Yes Yes Yes Yes      With       Hypotension     No     No   Requiring IV fluids but not requiring vasopressors   Requiring one vasopressor with or without vasopressin     Requiring multiple vasopressors (excluding vasopressin)        And/or     Hypoxia   No   No   Requiring <= 6 L/min O2 via nasal cannula or blow-by     Requiring O2 via high-flow nasal cannula, facemask, or non-rebreather     Requiring O2 via positive pressure (eg CPAP, BiPAP, or mechanical venitlation)     CRS stGstrstastdstest:st st1st ICE Assessment:  Orientation: Orientation to month, year, hospital, city (1 point each) = 4 points total   Naming: Name 3 objects (eg clock, pen, button) (1 point each) = 3 points total  Following Commands: eg Show me two fingers or close your eyes and stick out your tongue (1 point) = 1 point total  Writing: Ability to write a standard sentence (eg Our national bird is the bald eagle) (1 point) = 1 point total  Attention: Count backwards from 100 by 10 (1 point) = 1 point total  ICE Score: 10    Score 10: No impairment or Grade 0 ICANS  Score 7-9: Grade 1 ICANS  Score 3-6: Grade 2 ICANS  Score 0-2: Grade 3 ICANS  Score 0 due to patient unarousable or unable to perform ICE assessment:  Grade 4 ICANS

## 2025-04-27 NOTE — PROGRESS NOTES
Chemotherapy Verification - PRIMARY RN      Height = 165.1 cm  Weight = 66.8 kg  BSA = 1.75 m2       Medication: Blinatumomab  Dose: 28 mcg/ day fixed dose  Calculated Dose: 28 mcg/ day fixed dose (Ordered Dose: 28 mcg/ day fixed dose)                             (In mg/m2, AUC, mg/kg)         I confirm this process was performed independently with the BSA and all final chemotherapy dosing calculations congruent.  Any discrepancies of 10% or greater have been addressed with the chemotherapy pharmacist. The resolution of the discrepancy has been documented in the EPIC progress notes.

## 2025-04-27 NOTE — CARE PLAN
Problem: Knowledge Deficit - Standard  Goal: Patient and family/care givers will demonstrate understanding of plan of care, disease process/condition, diagnostic tests and medications  Outcome: Progressing     Problem: Pain - Standard  Goal: Alleviation of pain or a reduction in pain to the patient’s comfort goal  Outcome: Progressing   Norco 1 tab given for pain.     Problem: Fall Risk  Goal: Patient will remain free from falls  Outcome: Progressing  Frame alarm on, pt refuses bed alarm.      The patient is Watcher - Medium risk of patient condition declining or worsening    Shift Goals  Clinical Goals: pt will tolerate blincyto  Patient Goals: Tolerate chemo, pain control, no fevers    Progress made toward(s) clinical / shift goals:      Patient is not progressing towards the following goals:    Raid called this morning for low BP. After 2 L bolus NS BP increased. Blincyto was stopped for 2 hr. Later on pt developed tremors. MD notified.

## 2025-04-27 NOTE — PROGRESS NOTES
Pharmacy Chemotherapy Verification Note:    Dx: relapsed B-ALL (ph+)        Protocol: Blincyto + TKI Consolidation     Blinatumomab (Blincyto) 28 mcg IV continuous infusion over 24 hrs daily on Days 1-28  Dasatinib 140 mg PO daily on Days 1-42 (or ponatinib 15 mg PO daily on Days 1-42)   - on Dasatinib 70 mg BID since induction (POM)  42-day cycle until transplant, disease progression, or unacceptable toxicity  NCCN Guidelines for ALL. V.2.2024.  Meg DUMAS et al. NEJM. 2020;383(17):1613-23.  david Quinones al. Lancet Haematol. 2023;10(1):e24-e34.    Allergies:  Amoxicillin     /62   Pulse 88   Temp 36.5 °C (97.7 °F) (Temporal)   Resp 18   Wt 66.8 kg (147 lb 4.3 oz)   SpO2 99%   BMI 24.51 kg/m²  Body surface area is 1.75 meters squared.    Labs from 4/24/25 reviewed - all within treatment parameters.  No lab parameters for day 2    4/26/25: patient had hypotension this morning, down to 79/37. Pt also required 1L of O2. Dr. Faye order IV hydrocortisone 100 mg q8h as BP was not responded to fluid bolus and MD paused Blincyto x2 hrs. BP remains adequate after restarting, however pt is drowsy and has tremors. Continuing Blincyto for now.     Drug Order   (Drug name, dose, route, IV Fluid & volume, frequency, number of doses) Cycle: 1, Day 3 of 28      Previous treatment: s/p 3-Drug reinduction, D29 LP on 4/4/25     Medication = blinatumomab (Blincyto)  Base Dose = 28 mcg/day fixed dose  Calc Dose: Base Dose x 2d = 56 mcg  Final Dose = 56 mg  Route = CIVI  Fluid & Volume =  mL (+ overfill)  Admin Duration = Over 48 hrs via CADD pump @ 5 mL/hr No bag exchange on D3 (4/26)    Days 1-28  Bag size may change to accommodate up to 7 day infusion        <10% difference, okay to treat with final dose     By my signature below, I confirm this process was performed independently with the BSA and all final chemotherapy dosing calculations congruent. I have reviewed the above chemotherapy order and that my calculation  of the final dose and BSA (when applicable) corroborate those calculations of the  pharmacist.     Brandy Larsen, PharmD

## 2025-04-27 NOTE — CARE PLAN
The patient is Watcher - Medium risk of patient condition declining or worsening    Shift Goals  Clinical Goals: monitor nuero status and VS, tolerate chemo  Patient Goals: tolerate chemo, rest  Family Goals: n/a    Progress made toward(s) clinical / shift goals:    Problem: Knowledge Deficit - Standard  Goal: Patient and family/care givers will demonstrate understanding of plan of care, disease process/condition, diagnostic tests and medications  Outcome: Progressing     Problem: Pain - Standard  Goal: Alleviation of pain or a reduction in pain to the patient’s comfort goal  Outcome: Progressing     Problem: Fall Risk  Goal: Patient will remain free from falls  Outcome: Progressing       Patient is not progressing towards the following goals:

## 2025-04-27 NOTE — PROGRESS NOTES
Chemotherapy Verification - SECONDARY RN       Height = 165.1 cm  Weight = 66.8 kg  BSA = 1.75 m2       Medication: Blinatumomab  Dose: 28 mcg/day (fixed dose)  Calculated Dose: 28 mcg/day (fixed dose) Ordered Dose: 28 mcg/day                             (In mg/m2, AUC, mg/kg)       I confirm that this process was performed independently.

## 2025-04-27 NOTE — PROGRESS NOTES
Pharmacy Chemotherapy Verification Note:    Dx: relapsed B-ALL (ph+)        Protocol: Blincyto + TKI Consolidation     Blinatumomab (Blincyto) 28 mcg IV continuous infusion over 24 hrs daily on Days 1-28  Dasatinib 140 mg PO daily on Days 1-42 (or ponatinib 15 mg PO daily on Days 1-42)   - on Dasatinib 70 mg BID since induction (POM)  42-day cycle until transplant, disease progression, or unacceptable toxicity  NCCN Guidelines for ALL. V.2.2024.  Meg DUMAS et al. NEJM. 2020;383(17):1613-23.  david Quinones al. Lancet Haematol. 2023;10(1):e24-e34.    Allergies:  Amoxicillin     /66   Pulse 78   Temp 36.7 °C (98 °F) (Temporal)   Resp 16   Wt 66.8 kg (147 lb 4.3 oz)   SpO2 98%   BMI 24.51 kg/m²  Body surface area is 1.75 meters squared.    No new labs at time of review 4/27/25. No hold parameters for day 4.      Drug Order   (Drug name, dose, route, IV Fluid & volume, frequency, number of doses) Cycle: 1, Day 4 of 28      Previous treatment: s/p 3-Drug reinduction, D29 LP on 4/4/25     Medication = blinatumomab (Blincyto)  Base Dose = 28 mcg/day fixed dose  Calc Dose: Base Dose x 1d = 28 mcg  Final Dose = 28 mg  Route = CIVI  Fluid & Volume =  mL (+ overfill)  Admin Duration = Over 24 hrs via CADD pump @ 10 mL/hr Days 1-28  Bag size may change to accommodate up to 7 day infusion      <10% difference, okay to treat with final dose     By my signature below, I confirm this process was performed independently with the BSA and all final chemotherapy dosing calculations congruent. I have reviewed the above chemotherapy order and that my calculation of the final dose and BSA (when applicable) corroborate those calculations of the  pharmacist.     Galen Wilson, XochiltD

## 2025-04-27 NOTE — CARE PLAN
The patient is Watcher - Medium risk of patient condition declining or worsening    Shift Goals  Clinical Goals: monitor neuro status, pt will continue to tolerate chemo  Patient Goals: tolerate chemo, rest  Family Goals: n/a    Progress made toward(s) clinical / shift goals:    Problem: Pain - Standard  Goal: Alleviation of pain or a reduction in pain to the patient’s comfort goal  Outcome: Progressing     Problem: Fall Risk  Goal: Patient will remain free from falls  Outcome: Progressing       Patient is not progressing towards the following goals:

## 2025-04-27 NOTE — H&P
Pediatric Hematology/Oncology   Progress Note    Patient Name:  Tomas Jean-Baptiste  : 2000   MRN: [id number]    Location of Service: [facility] of the [facility]  Date of Service: 2025  Time: 10:00 AM    Primary Care Physician: Margarito Arvizu M.D.    Protocol / Therapy Plan: Individualized Immunotherapy to Bridge to Transplant, Blinatumomab (+Dasatinib) Block 1, Day 3    OVERNIGHT:     No acute events overnight but was febrile to 102 °F yesterday at the time blinatumomab was started.  Fever resolved with Tylenol.  No additional events or concerns through the course of the night.  This morning however upon awakening, Tomas Roy had some brief desaturations requiring temporary oxygen.  Additionally, his blood pressures became quite soft necessitating fluid bolus.  He was initially responsive to fluid bolus however his blood pressures would quickly become soft again requiring a second fluid bolus as well as a dose of Solu-Cortef.  Along with the desaturations and drop in blood pressure, Tomas Roy also had some mild neurologic changes to include increased tremor.  He denied however any headaches, changes in vision.  He continues to complain of lower back pain bilaterally.  No complaints of any nausea, vomiting or diarrhea or constipation.  No complaints of any easy bruising or bleeding.  No other concerns or complaints at this time.    Review of Systems:     Constitutional: Febrile to 102 °F.    HENT: Negative for auditory changes, nosebleeds and sore throat.  No mouth sores.  Eyes: Negative for visual changes.  Respiratory: Hypoxemia as above  Cardiovascular: Negative.  Gastrointestinal: Negative for nausea, vomiting, abdominal pain, diarrhea, constipation.  Genitourinary: Negative.  Musculoskeletal: Continues to complain of lower back pain  Skin: Negative for rash, signs of infection.  Neurological: Increased tremor  Endo/Heme/Allergies: Does not bruise/bleed easily.     Psychiatric/Behavioral: No changes in mood, appropriate for age.   OBJECTIVE:     Vitals:   /79   Pulse 93   Temp 36.6 °C (97.9 °F) (Temporal)   Resp 16   Wt 66.8 kg (147 lb 4.3 oz)   SpO2 98%     Labs:   Latest Reference Range & Units 04/26/25 08:49 04/26/25 08:54   WBC 4.8 - 10.8 K/uL 4.7 (L)    RBC 4.70 - 6.10 M/uL 2.22 (L)    Hemoglobin 14.0 - 18.0 g/dL 6.4 (L)    Hematocrit 42.0 - 52.0 % 21.3 (L)    MCV 81.4 - 97.8 fL 95.9    MCH 27.0 - 33.0 pg 28.8    MCHC 32.3 - 36.5 g/dL 30.0 (L)    RDW 35.9 - 50.0 fL 62.8 (H)    Platelet Count 164 - 446 K/uL 436    MPV 9.0 - 12.9 fL 9.2    Neutrophils-Polys 44.00 - 72.00 % 69.20    Neutrophils (Absolute) 1.82 - 7.42 K/uL 3.24    Lymphocytes 22.00 - 41.00 % 19.70 (L)    Lymphs (Absolute) 1.00 - 4.80 K/uL 0.92 (L)    Monocytes 0.00 - 13.40 % 10.30    Monos (Absolute) 0.00 - 0.85 K/uL 0.48    Eosinophils 0.00 - 6.90 % 0.00    Eos (Absolute) 0.00 - 0.51 K/uL 0.00    Basophils 0.00 - 1.80 % 0.20    Baso (Absolute) 0.00 - 0.12 K/uL 0.01    Immature Granulocytes 0.00 - 0.90 % 0.60    Immature Granulocytes (abs) 0.00 - 0.11 K/uL 0.03    Nucleated RBC 0.00 - 0.20 /100 WBC 0.60 (H)    NRBC (Absolute) K/uL 0.03    Sodium 135 - 145 mmol/L 137    Potassium 3.6 - 5.5 mmol/L 3.6    Chloride 96 - 112 mmol/L 102    Co2 20 - 33 mmol/L 23    Anion Gap 7.0 - 16.0  12.0    Glucose 65 - 99 mg/dL 87    Bun 8 - 22 mg/dL 7 (L)    Creatinine 0.50 - 1.40 mg/dL 0.60    GFR (CKD-EPI) >60 mL/min/1.73 m 2 139    Calcium 8.5 - 10.5 mg/dL 8.4 (L)    Correct Calcium 8.5 - 10.5 mg/dL 9.2    AST(SGOT) 12 - 45 U/L 29    ALT(SGPT) 2 - 50 U/L 54 (H)    Alkaline Phosphatase 30 - 99 U/L 60    Total Bilirubin 0.1 - 1.5 mg/dL <0.2    Albumin 3.2 - 4.9 g/dL 3.0 (L)    Total Protein 6.0 - 8.2 g/dL 6.2    Globulin 1.9 - 3.5 g/dL 3.2    A-G Ratio g/dL 0.9    Significant Indicator   NEG (P)   Site   Peripheral (P)   Source   BLD (P)   (L): Data is abnormally low  (H): Data is abnormally high  (P):  Preliminary    Physical Exam:    Constitutional: Well-developed, well-nourished, and in no distress.  Well-appearing.  HENT: Normocephalic and atraumatic. No nasal congestion or rhinorrhea. Oropharynx is clear and moist. No oral ulcerations or sores.    Eyes: Conjunctivae are normal. Pupils are equal, round.  EOMI.  Nonicteric.  Glasses.  Neck: Normal range of motion of neck, no adenopathy.    Cardiovascular: Normal rate, regular rhythm and normal heart sounds.  No murmur heard. DP/radial pulses 2+, cap refill < 2 sec.  Pulmonary/Chest: Effort normal and breath sounds normal. No respiratory distress. Symmetric expansion.  No crackles or wheezes.  Abdomen: Soft. Bowel sounds are normal. No distension and no mass. There is no hepatosplenomegaly.    Genitourinary:  Deferred.  Musculoskeletal: Normal range of motion of lower and upper extremities bilaterally. No tenderness to palpation of elbows, wrists, hands, knees, ankles and feet bilaterally.  Unchanged right medial thigh lesion .  lymphadenopathy: No cervical adenopathy.  Neurological: Alert and oriented to person and place. Exhibits normal muscle tone bilaterally in upper and lower extremities. Gait normal. Coordination normal.    Skin: Skin is warm, dry and pink.  No rash or evidence of skin infection.  No pallor.   Psychiatric: Mood and affect normal for age.    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 Likely plan for HSCT, search for MUD ongoing. After discussing multiple options, they decided on a 3-Drug Re-Induction (VCR/PRED/PEG-ASP) +Imatinib followed by blinatumomab. 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 Met virtually with Dr. Adrian, [facility] 3/20/2025     - Has had discussions with both bone marrow transplant as well as some therapeutics (CAR-T) at [facility].  Complete remission after re-induction, making CAR-T ineligible  - End of Reinduction evaluations demonstrating phenotypically similar aberrant lymphoblast population of 0.005% (however lacking CD19 or CD22)   - Repeat BM at [facility] confirming remission    - Given CR2, will pursue transplantation     Individualized Bridging Therapy with Blinatumomab, Day 3:  ** Methotrexate 15 mg IT x 1 on Days 1 (COMPLETED at End of Re-Induction, see separate procedure note)  ** Dexamethasone prior to starting blinatumomab, 12 mg PO x 1 (COMPLETED)  ** Blinatumomab 28 mCg per day IV continuous infusion for 28 days, started on 4/25/2025. 24-hour infusion until 4/26/2025, 48-hour infusion on 4/27/2025 until 4/28/2025.   - Unlikely to discharge to [facility] after three days for 7 days  cassette  ** OPIC schedule TBD  ** Monitor for CRS, monitor for neurologic side effects while inpatient (tremors, seizures)      ** Continue Dasatinib 70 mg PO BID, started 3/22/2025     - Febrile       2) Pancytopenia Secondary to Leukemia and Therapy:  - WBC: 4700/microliters, Hemoglobin: 6.4 gm/dL, Platelets: 436,000/microliters   - ANC 3240/microliters, A/microliters   - Transfuse irradiated PRBC for Hgb<7 g/dL or symptomatic.   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 No clinical lesions  - Chlorhexidine mouthwash 4 times daily     4) History of Rothia mucilaginosa Bacteremia:              - Blood cultures obtained on presentation 3/22/2025 from CVL (both lumens) with Rothia mucilaginosa               - 3/25/25 Blood Cx negative to date              - Was placed on empiric cefepime and Flagyl upon admission               - Vancomycin started for Rothia on 3/23/2025              - Completed therapy and discontinued Cefepime, Flagyl and Vancomycin on 2025   - See nodule in thigh below     5) FEN/GI:  - Regular diet     6) Nodule, Right Medial Thigh: (IMPROVED WITH ANTIBIOTICS)  - Tender and palpable  - Monitor for fever  - Consider possible infectious etiology  - If fever, draw cultures and start empiric antibiotics  - US Soft Tissue demonstrating hypoechoic lesion possibly fat necrosis, inflammatory process or infection    7) At Risk For Nausea and Vomiting Secondary To Therapy             - Zofran changed to PRN              - Scopolamine patch every 3 days             - Ativan IV PRN for breakthrough N/V available     8) Transaminitis Secondary To Therapy: (RESOLVED)  - AST: 54 U/L, ALT: 29 U/L on presentation  -  Bilirubin total 0.7 mg/dL on presentation  - Will continue to monitor     9) At Risk For Hypovitaminosis D             - Continue Vitamin D3 supplement daily     10) Central Access:  - Double-lumen CVL placed 2/28/2025 by surgery  - Saline flush per protocol     11) Psychosocial:  - Dr. Ortega following     12) Social:              - Referred to Social Work and financial navigator     Disposition: Remain inpatient for additional 24 hours.  Discharge to [facility] when clinically improved. blinatumomab bag change.    Pepe Faye MD  Pediatric Hematology / Oncology  [facility]  Cell.  [phone]  Office. [phone]

## 2025-04-28 LAB
ALBUMIN SERPL BCP-MCNC: 3.3 G/DL (ref 3.2–4.9)
ALBUMIN/GLOB SERPL: 1.1 G/DL
ALP SERPL-CCNC: 60 U/L (ref 30–99)
ALT SERPL-CCNC: 23 U/L (ref 2–50)
ANION GAP SERPL CALC-SCNC: 9 MMOL/L (ref 7–16)
AST SERPL-CCNC: 25 U/L (ref 12–45)
BASOPHILS # BLD AUTO: 0.2 % (ref 0–1.8)
BASOPHILS # BLD: 0.02 K/UL (ref 0–0.12)
BILIRUB SERPL-MCNC: 0.3 MG/DL (ref 0.1–1.5)
BUN SERPL-MCNC: 12 MG/DL (ref 8–22)
CALCIUM ALBUM COR SERPL-MCNC: 9.2 MG/DL (ref 8.5–10.5)
CALCIUM SERPL-MCNC: 8.6 MG/DL (ref 8.5–10.5)
CHLORIDE SERPL-SCNC: 107 MMOL/L (ref 96–112)
CO2 SERPL-SCNC: 25 MMOL/L (ref 20–33)
CREAT SERPL-MCNC: 0.5 MG/DL (ref 0.5–1.4)
EOSINOPHIL # BLD AUTO: 0 K/UL (ref 0–0.51)
EOSINOPHIL NFR BLD: 0 % (ref 0–6.9)
ERYTHROCYTE [DISTWIDTH] IN BLOOD BY AUTOMATED COUNT: 64.1 FL (ref 35.9–50)
GFR SERPLBLD CREATININE-BSD FMLA CKD-EPI: 146 ML/MIN/1.73 M 2
GLOBULIN SER CALC-MCNC: 2.9 G/DL (ref 1.9–3.5)
GLUCOSE SERPL-MCNC: 138 MG/DL (ref 65–99)
HCT VFR BLD AUTO: 28.3 % (ref 42–52)
HGB BLD-MCNC: 8.5 G/DL (ref 14–18)
IMM GRANULOCYTES # BLD AUTO: 0.08 K/UL (ref 0–0.11)
IMM GRANULOCYTES NFR BLD AUTO: 0.9 % (ref 0–0.9)
LYMPHOCYTES # BLD AUTO: 2.88 K/UL (ref 1–4.8)
LYMPHOCYTES NFR BLD: 31.8 % (ref 22–41)
MCH RBC QN AUTO: 28.4 PG (ref 27–33)
MCHC RBC AUTO-ENTMCNC: 30 G/DL (ref 32.3–36.5)
MCV RBC AUTO: 94.6 FL (ref 81.4–97.8)
MONOCYTES # BLD AUTO: 0.65 K/UL (ref 0–0.85)
MONOCYTES NFR BLD AUTO: 7.2 % (ref 0–13.4)
NEUTROPHILS # BLD AUTO: 5.43 K/UL (ref 1.82–7.42)
NEUTROPHILS NFR BLD: 59.9 % (ref 44–72)
NRBC # BLD AUTO: 0.07 K/UL
NRBC BLD-RTO: 0.8 /100 WBC (ref 0–0.2)
PLATELET # BLD AUTO: 434 K/UL (ref 164–446)
PMV BLD AUTO: 9.3 FL (ref 9–12.9)
POTASSIUM SERPL-SCNC: 3.7 MMOL/L (ref 3.6–5.5)
PROT SERPL-MCNC: 6.2 G/DL (ref 6–8.2)
RBC # BLD AUTO: 2.99 M/UL (ref 4.7–6.1)
SODIUM SERPL-SCNC: 141 MMOL/L (ref 135–145)
WBC # BLD AUTO: 9.1 K/UL (ref 4.8–10.8)

## 2025-04-28 PROCEDURE — 770004 HCHG ROOM/CARE - ONCOLOGY PRIVATE *

## 2025-04-28 PROCEDURE — A9270 NON-COVERED ITEM OR SERVICE: HCPCS | Performed by: PEDIATRICS

## 2025-04-28 PROCEDURE — 700105 HCHG RX REV CODE 258: Performed by: PEDIATRICS

## 2025-04-28 PROCEDURE — 700111 HCHG RX REV CODE 636 W/ 250 OVERRIDE (IP): Mod: JZ | Performed by: PEDIATRICS

## 2025-04-28 PROCEDURE — 80053 COMPREHEN METABOLIC PANEL: CPT

## 2025-04-28 PROCEDURE — 700102 HCHG RX REV CODE 250 W/ 637 OVERRIDE(OP): Performed by: PEDIATRICS

## 2025-04-28 PROCEDURE — 85025 COMPLETE CBC W/AUTO DIFF WBC: CPT

## 2025-04-28 PROCEDURE — 99233 SBSQ HOSP IP/OBS HIGH 50: CPT | Performed by: PEDIATRICS

## 2025-04-28 RX ORDER — HYDROCORTISONE SODIUM SUCCINATE 100 MG/2ML
100 INJECTION INTRAMUSCULAR; INTRAVENOUS DAILY
Status: DISCONTINUED | OUTPATIENT
Start: 2025-04-29 | End: 2025-04-28

## 2025-04-28 RX ADMIN — ANTACID TABLETS 1500 MG: 500 TABLET, CHEWABLE ORAL at 11:41

## 2025-04-28 RX ADMIN — HYDROCORTISONE SODIUM SUCCINATE 100 MG: 100 INJECTION, POWDER, FOR SOLUTION INTRAMUSCULAR; INTRAVENOUS at 06:00

## 2025-04-28 RX ADMIN — DASATINIB 70 MG: 70 TABLET ORAL at 05:59

## 2025-04-28 RX ADMIN — ANTACID TABLETS 1500 MG: 500 TABLET, CHEWABLE ORAL at 16:46

## 2025-04-28 RX ADMIN — BLINATUMOMAB 28 MCG: KIT INTRAVENOUS at 16:22

## 2025-04-28 RX ADMIN — CEFEPIME 2 G: 2 INJECTION, POWDER, FOR SOLUTION INTRAVENOUS at 13:47

## 2025-04-28 RX ADMIN — CEFEPIME 2 G: 2 INJECTION, POWDER, FOR SOLUTION INTRAVENOUS at 06:14

## 2025-04-28 RX ADMIN — Medication 1000 UNITS: at 06:00

## 2025-04-28 RX ADMIN — CEFEPIME 2 G: 2 INJECTION, POWDER, FOR SOLUTION INTRAVENOUS at 21:55

## 2025-04-28 RX ADMIN — DASATINIB 70 MG: 70 TABLET ORAL at 16:46

## 2025-04-28 ASSESSMENT — PAIN DESCRIPTION - PAIN TYPE: TYPE: ACUTE PAIN

## 2025-04-28 NOTE — PROGRESS NOTES
"Pharmacy Chemotherapy Verification Note:    Dx: relapsed B-ALL (ph+)        Protocol: Blincyto + TKI Consolidation     Blinatumomab (Blincyto) 28 mcg IV continuous infusion over 24 hrs daily on Days 1-28  Dasatinib 140 mg PO daily on Days 1-42 (or ponatinib 15 mg PO daily on Days 1-42)   - on Dasatinib 70 mg BID since induction (POM)  42-day cycle until transplant, disease progression, or unacceptable toxicity  NCCN Guidelines for ALL. V.2.2024.  Meg DUMAS et al. NEJM. 2020;383(17):1613-23.  david Quinones al. Lancet Haematol. 2023;10(1):e24-e34.    Allergies:  Amoxicillin     /73   Pulse 77   Temp 36.7 °C (98.1 °F) (Oral)   Resp 16   Ht 1.651 m (5' 5\")   Wt 66.8 kg (147 lb 4.3 oz)   SpO2 97%   BMI 24.51 kg/m²  Body surface area is 1.75 meters squared.    All  labs  reviewed 4/28/25. No hold parameters for day 5.      Drug Order   (Drug name, dose, route, IV Fluid & volume, frequency, number of doses) Cycle: 1, Day 5 of 28      Previous treatment: s/p 3-Drug reinduction, D29 LP on 4/4/25     Medication = blinatumomab (Blincyto)  Base Dose = 28 mcg/day fixed dose  Calc Dose: Base Dose x 1d = 28 mcg  Final Dose = 28 mg  Route = CIVI  Fluid & Volume =  mL (+ overfill)  Admin Duration = Over 24 hrs via CADD pump @ 10 mL/hr Days 1-28  Bag size may change to accommodate up to 7 day infusion      <10% difference, okay to treat with final dose     By my signature below, I confirm this process was performed independently with the BSA and all final chemotherapy dosing calculations congruent. I have reviewed the above chemotherapy order and that my calculation of the final dose and BSA (when applicable) corroborate those calculations of the  pharmacist.     Galen Wilson, PharmD    "

## 2025-04-28 NOTE — PROGRESS NOTES
Chemotherapy Verification - SECONDARY RN       Height = 165.1cm  Weight = 66.8kg  BSA = 1.75m2       Medication: blinatumomab  Dose: 28mcg  Calculated Dose: n/a (ordered dose: 28mcg)                             (In mg/m2, AUC, mg/kg)     I confirm that this process was performed independently.

## 2025-04-28 NOTE — PROGRESS NOTES
Chemotherapy Verification - PRIMARY RN      Height = 165.1 cm  Weight = 66.8 kg  BSA = 1.75 m2       Medication: blinatumomab  Dose: 28 mcg/day  Calculated Dose: set dose                             (28 mcg set dose ordered)         I confirm this process was performed independently with the BSA and all final chemotherapy dosing calculations congruent.  Any discrepancies of 10% or greater have been addressed with the chemotherapy pharmacist. The resolution of the discrepancy has been documented in the EPIC progress notes.

## 2025-04-28 NOTE — CARE PLAN
The patient is Watcher - Medium risk of patient condition declining or worsening    Shift Goals  Clinical Goals: maintain stable neuro status  Patient Goals: sleep  Family Goals: not present    Progress made toward(s) clinical / shift goals:        Problem: Knowledge Deficit - Standard  Goal: Patient and family/care givers will demonstrate understanding of plan of care, disease process/condition, diagnostic tests and medications  Outcome: Progressing     Problem: Pain - Standard  Goal: Alleviation of pain or a reduction in pain to the patient’s comfort goal  Outcome: Progressing     Problem: Fall Risk  Goal: Patient will remain free from falls  Outcome: Progressing

## 2025-04-28 NOTE — DISCHARGE PLANNING
Case Management Discharge Planning    Admission Date: 4/24/2025  GMLOS: 3.9  ALOS: 4    6-Clicks ADL Score: 24  6-Clicks Mobility Score: 24      Anticipated Discharge Dispo: Discharge Disposition: Discharged to home/self care (01)    DME Needed: No    Action(s) Taken: Plan is to wean patient's steroids. Patient will need an OPIC appointment on day of discharge for Blincyto. Pending Peds oncology clearance     Escalations Completed: None    Medically Clear: No    Next Steps: pending peds oncology clearance     Barriers to Discharge: Medical clearance    Is the patient up for discharge tomorrow: No

## 2025-04-28 NOTE — H&P
Pediatric Hematology/Oncology   Progress Note    Patient Name:  Tomas Jean-Baptiste  : 2000   MRN: [id number]    Location of Service: [facility]  Date of Service: 2025  Time: 9:00 AM    Primary Care Physician: Margarito Arvizu M.D.    Protocol / Therapy Plan: Individualized Immunotherapy to Bridge to Transplant, Blinatumomab (+Dasatinib) Block 1, Day 4    OVERNIGHT:     No acute events overnight.  Afebrile with Tmax 99.1 °F.  Overall feeling much improved from yesterday and the day before.  No complaints of any headaches, changes in vision or neurologic status changes.  No complaints of any dizziness or lightheadedness.  Blood pressures have maintained without any drops or concerns for soft blood pressure.  No tachycardia.  No complaints of any nausea, vomiting, diarrhea or constipation.  No complaints of any abdominal discomfort or pain.  Still complains of right inner thigh tenderness and palpable nodule.  No overlying skin changes or warmth.  No other skin changes or signs and symptoms of rash or infection.  Less complaint of lower back pain today.  No other concerns or complaints at this time.    Review of Systems:     Constitutional: Afebrile Tmax 99.1.  Continues to be improved clinically.  Improved energy.  Improved activity.  HENT: Negative for auditory changes, nosebleeds and sore throat.  No mouth sores.  Eyes: Negative for visual changes.  Respiratory: No additional hypoxemia.  Saturating well on room air.  Cardiovascular: Negative.  Gastrointestinal: Negative for nausea, vomiting, abdominal pain, diarrhea, constipation.  Genitourinary: Negative.  Musculoskeletal: No complaint of lower back pain today.  Skin: Negative for rash, signs of infection.  Neurological: Improved tremor.  Endo/Heme/Allergies: Does not bruise/bleed easily.    Psychiatric/Behavioral: No changes in mood, appropriate for age.   OBJECTIVE:     Tmax:  Temp (24hrs), Av.7 °C (98 °F), Min:36.4 °C (97.6 °F),  "Max:37.3 °C (99.1 °F)    Vitals:   /66   Pulse 90   Temp 36.4 °C (97.6 °F) (Oral)   Resp 18   Ht 1.651 m (5' 5\")   Wt 66.8 kg (147 lb 4.3 oz)   SpO2 95%     Labs:     Latest Reference Range & Units 04/28/25 06:16 04/28/25 10:34   WBC 4.8 - 10.8 K/uL 9.1    RBC 4.70 - 6.10 M/uL 2.99 (L)    Hemoglobin 14.0 - 18.0 g/dL 8.5 (L)    Hematocrit 42.0 - 52.0 % 28.3 (L)    MCV 81.4 - 97.8 fL 94.6    MCH 27.0 - 33.0 pg 28.4    MCHC 32.3 - 36.5 g/dL 30.0 (L)    RDW 35.9 - 50.0 fL 64.1 (H)    Platelet Count 164 - 446 K/uL 434    MPV 9.0 - 12.9 fL 9.3    Neutrophils-Polys 44.00 - 72.00 % 59.90    Neutrophils (Absolute) 1.82 - 7.42 K/uL 5.43    Lymphocytes 22.00 - 41.00 % 31.80    Lymphs (Absolute) 1.00 - 4.80 K/uL 2.88    Monocytes 0.00 - 13.40 % 7.20    Monos (Absolute) 0.00 - 0.85 K/uL 0.65    Eosinophils 0.00 - 6.90 % 0.00    Eos (Absolute) 0.00 - 0.51 K/uL 0.00    Basophils 0.00 - 1.80 % 0.20    Baso (Absolute) 0.00 - 0.12 K/uL 0.02    Immature Granulocytes 0.00 - 0.90 % 0.90    Immature Granulocytes (abs) 0.00 - 0.11 K/uL 0.08    Nucleated RBC 0.00 - 0.20 /100 WBC 0.80 (H)    NRBC (Absolute) K/uL 0.07    Sodium 135 - 145 mmol/L  141   Potassium 3.6 - 5.5 mmol/L  3.7   Chloride 96 - 112 mmol/L  107   Co2 20 - 33 mmol/L  25   Anion Gap 7.0 - 16.0   9.0   Glucose 65 - 99 mg/dL  138 (H)   Bun 8 - 22 mg/dL  12   Creatinine 0.50 - 1.40 mg/dL  0.50   GFR (CKD-EPI) >60 mL/min/1.73 m 2  146   Calcium 8.5 - 10.5 mg/dL  8.6   Correct Calcium 8.5 - 10.5 mg/dL  9.2   AST(SGOT) 12 - 45 U/L  25   ALT(SGPT) 2 - 50 U/L  23   Alkaline Phosphatase 30 - 99 U/L  60   Total Bilirubin 0.1 - 1.5 mg/dL  0.3   Albumin 3.2 - 4.9 g/dL  3.3   Total Protein 6.0 - 8.2 g/dL  6.2   Globulin 1.9 - 3.5 g/dL  2.9   A-G Ratio g/dL  1.1   (L): Data is abnormally low  (H): Data is abnormally high     Physical Exam:    Constitutional: Well-developed, well-nourished, and in no distress.  Well-appearing.  HENT: Normocephalic and atraumatic. No nasal " congestion or rhinorrhea. Oropharynx is clear and moist. No oral ulcerations or sores.    Eyes: Conjunctivae are normal. Pupils are equal, round.  EOMI.  Nonicteric.  Glasses.  Neck: Normal range of motion of neck, no adenopathy.    Cardiovascular: Normal rate, regular rhythm and normal heart sounds.  No murmur heard. DP/radial pulses 2+, cap refill < 2 sec.  Pulmonary/Chest: Effort normal and breath sounds normal. No respiratory distress. Symmetric expansion.  No crackles or wheezes.  Abdomen: Soft. Bowel sounds are normal. No distension and no mass. There is no hepatosplenomegaly.    Genitourinary:  Deferred.  Musculoskeletal: Normal range of motion of lower and upper extremities bilaterally. No tenderness to palpation of elbows, wrists, hands, knees, ankles and feet bilaterally.  Stable right medial thigh lesion however slightly more tender this morning than on previous exam.  lymphadenopathy: No cervical adenopathy.  Neurological: Alert and oriented to person and place. Exhibits normal muscle tone bilaterally in upper and lower extremities. Gait not assessed. Coordination normal.    Skin: Skin is warm, dry and pink.  No rash or evidence of skin infection.  No pallor.   Psychiatric: Mood and affect normal for age.    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 Precipitous drop of counts just  prior to dx with mildly elevated temperatures and bone pain had concern for relapsed leukemia. 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 Likely plan for HSCT, search for MUD ongoing. After discussing multiple options, they decided on a 3-Drug Re-Induction (VCR/PRED/PEG-ASP) +Imatinib followed by blinatumomab. 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 Met virtually with Dr. Adrain, [facility] 3/20/2025     - Has had discussions with both bone marrow transplant as well as some therapeutics (CAR-T) at [facility].  Complete remission after re-induction, making CAR-T ineligible  - End of Reinduction evaluations demonstrating phenotypically similar aberrant lymphoblast population of 0.005% (however lacking CD19 or CD22)   - Repeat BM at [facility] confirming remission    - Given CR2, will pursue transplantation     Individualized Bridging Therapy with Blinatumomab, Day 5:  ** Methotrexate 15 mg IT x 1 on Days 1 (COMPLETED at End of Re-Induction, see separate procedure note)  ** Dexamethasone prior to starting blinatumomab, 12 mg PO x 1 (COMPLETED)  ** Blinatumomab 28 mCg per day IV continuous infusion for 28 days, started on 4/25/2025. 24-hour infusion until 4/26/2025, - as patient is hospitalized with complications yesterday and no definitive discharge date, will continue with 24-hour blinatumomab bags until discharge.    ** OPIC schedule TBD  ** Monitor for CRS, monitor for  neurologic side effects while inpatient (tremors, seizures)      ** Continue Dasatinib 70 mg PO BID, started 3/22/2025     2) Clinical Sepsis:   - Fever at around the time of blinatumomab start   - Blood cultures negative to date   - On empiric cefepime   - Only possible source identified at this time is right medial thigh region.   - Drop in blood pressure to 70s over 30s requiring fluid resuscitation and dosing Solu-Cortef   - Likely adrenal suppression    3) Pancytopenia Secondary to Leukemia and Therapy:  - WBC: 9100/microliters, Hemoglobin: 8.5 gm/dL following transfusion, platelets 434,000/microliters   - ANC 5430/microliters, A/microliters   - Transfuse irradiated PRBC for Hgb<7 g/dL or symptomatic.   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 No clinical lesions  - Chlorhexidine mouthwash 4 times daily     5) History of Rothia mucilaginosa Bacteremia:              - Blood cultures obtained on presentation 3/22/2025 from CVL (both lumens) with Rothia mucilaginosa               - 3/25/25 Blood Cx negative to date              - Was placed on empiric cefepime and Flagyl upon admission               - Vancomycin started for Rothia on 3/23/2025              - Completed therapy and discontinued Cefepime, Flagyl and Vancomycin on 2025   - See nodule in thigh below     6) FEN/GI:  - Regular diet     7) Nodule, Right Medial Thigh: (IMPROVED WITH ANTIBIOTICS)  - Tender and palpable  - Monitor for fever  - Consider possible infectious etiology  - Blood cultures remain negative  - US Soft Tissue demonstrating hypoechoic lesion possibly fat necrosis, inflammatory process or infection    8) At Risk For Nausea and Vomiting Secondary To Therapy             - Zofran changed to PRN               - Scopolamine patch every 3 days             - Ativan IV PRN for breakthrough N/V available     9) Transaminitis Secondary To Therapy: (RESOLVED)  - AST: 25 U/L, ALT: 23 U/L on 4/28/2025  - Bilirubin total 0.3 mg/dL on 4/28/2025  - Will continue to monitor     10) At Risk For Hypovitaminosis D:             - Continue Vitamin D3 supplement daily     11) Central Access:  - Double-lumen CVL placed 2/28/2025 by surgery  - Saline flush per protocol     12) Psychosocial:  - Dr. Ortega following     13) Social:              - Referred to Social Work and financial navigator     Disposition: Remain inpatient for additional 24 hours.  [facility] when clinically improved. blinatumomab bag change.    Pepe Faye MD  Pediatric Hematology / Oncology  [facility]  Cell.  [phone]  Office. [phone]

## 2025-04-28 NOTE — CARE PLAN
Problem: Knowledge Deficit - Standard  Goal: Patient and family/care givers will demonstrate understanding of plan of care, disease process/condition, diagnostic tests and medications  Outcome: Progressing     Problem: Pain - Standard  Goal: Alleviation of pain or a reduction in pain to the patient’s comfort goal  Outcome: Progressing     The patient is Watcher - Medium risk of patient condition declining or worsening    Shift Goals  Clinical Goals: maintain stable neuro status  Patient Goals: sleep  Family Goals: not present    Progress made toward(s) clinical / shift goals:  Pt updated on POC    Patient is not progressing towards the following goals:

## 2025-04-29 DIAGNOSIS — Z76.82 BONE MARROW TRANSPLANT CANDIDATE: ICD-10-CM

## 2025-04-29 LAB
ALBUMIN SERPL BCP-MCNC: 3.3 G/DL (ref 3.2–4.9)
ALBUMIN/GLOB SERPL: 1.3 G/DL
ALP SERPL-CCNC: 66 U/L (ref 30–99)
ALT SERPL-CCNC: 30 U/L (ref 2–50)
ANION GAP SERPL CALC-SCNC: 7 MMOL/L (ref 7–16)
ANISOCYTOSIS BLD QL SMEAR: ABNORMAL
AST SERPL-CCNC: 33 U/L (ref 12–45)
BASOPHILS # BLD AUTO: 0 % (ref 0–1.8)
BASOPHILS # BLD: 0 K/UL (ref 0–0.12)
BILIRUB SERPL-MCNC: 0.3 MG/DL (ref 0.1–1.5)
BUN SERPL-MCNC: 8 MG/DL (ref 8–22)
CALCIUM ALBUM COR SERPL-MCNC: 8.9 MG/DL (ref 8.5–10.5)
CALCIUM SERPL-MCNC: 8.3 MG/DL (ref 8.5–10.5)
CHLORIDE SERPL-SCNC: 108 MMOL/L (ref 96–112)
CO2 SERPL-SCNC: 27 MMOL/L (ref 20–33)
CREAT SERPL-MCNC: 0.45 MG/DL (ref 0.5–1.4)
EOSINOPHIL # BLD AUTO: 0 K/UL (ref 0–0.51)
EOSINOPHIL NFR BLD: 0 % (ref 0–6.9)
ERYTHROCYTE [DISTWIDTH] IN BLOOD BY AUTOMATED COUNT: 60.4 FL (ref 35.9–50)
GFR SERPLBLD CREATININE-BSD FMLA CKD-EPI: 151 ML/MIN/1.73 M 2
GLOBULIN SER CALC-MCNC: 2.6 G/DL (ref 1.9–3.5)
GLUCOSE SERPL-MCNC: 90 MG/DL (ref 65–99)
HCT VFR BLD AUTO: 29.2 % (ref 42–52)
HGB BLD-MCNC: 9 G/DL (ref 14–18)
HYPOCHROMIA BLD QL SMEAR: ABNORMAL
LYMPHOCYTES # BLD AUTO: 2.78 K/UL (ref 1–4.8)
LYMPHOCYTES NFR BLD: 21.9 % (ref 22–41)
MACROCYTES BLD QL SMEAR: ABNORMAL
MANUAL DIFF BLD: NORMAL
MCH RBC QN AUTO: 28.8 PG (ref 27–33)
MCHC RBC AUTO-ENTMCNC: 30.8 G/DL (ref 32.3–36.5)
MCV RBC AUTO: 93.6 FL (ref 81.4–97.8)
MICROCYTES BLD QL SMEAR: ABNORMAL
MONOCYTES # BLD AUTO: 1.45 K/UL (ref 0–0.85)
MONOCYTES NFR BLD AUTO: 11.4 % (ref 0–13.4)
MORPHOLOGY BLD-IMP: NORMAL
MYELOCYTES NFR BLD MANUAL: 0.9 %
NEUTROPHILS # BLD AUTO: 8.36 K/UL (ref 1.82–7.42)
NEUTROPHILS NFR BLD: 65.8 % (ref 44–72)
NRBC # BLD AUTO: 0.19 K/UL
NRBC BLD-RTO: 1.5 /100 WBC (ref 0–0.2)
PLATELET # BLD AUTO: 370 K/UL (ref 164–446)
PLATELET BLD QL SMEAR: NORMAL
PMV BLD AUTO: 8.9 FL (ref 9–12.9)
POTASSIUM SERPL-SCNC: 3.3 MMOL/L (ref 3.6–5.5)
PROT SERPL-MCNC: 5.9 G/DL (ref 6–8.2)
RBC # BLD AUTO: 3.12 M/UL (ref 4.7–6.1)
RBC BLD AUTO: PRESENT
SODIUM SERPL-SCNC: 142 MMOL/L (ref 135–145)
WBC # BLD AUTO: 12.7 K/UL (ref 4.8–10.8)

## 2025-04-29 PROCEDURE — 85007 BL SMEAR W/DIFF WBC COUNT: CPT

## 2025-04-29 PROCEDURE — 700102 HCHG RX REV CODE 250 W/ 637 OVERRIDE(OP): Performed by: PEDIATRICS

## 2025-04-29 PROCEDURE — 85027 COMPLETE CBC AUTOMATED: CPT

## 2025-04-29 PROCEDURE — A9270 NON-COVERED ITEM OR SERVICE: HCPCS | Performed by: PEDIATRICS

## 2025-04-29 PROCEDURE — 770004 HCHG ROOM/CARE - ONCOLOGY PRIVATE *

## 2025-04-29 PROCEDURE — 80053 COMPREHEN METABOLIC PANEL: CPT

## 2025-04-29 PROCEDURE — 99233 SBSQ HOSP IP/OBS HIGH 50: CPT | Performed by: PEDIATRICS

## 2025-04-29 PROCEDURE — 700111 HCHG RX REV CODE 636 W/ 250 OVERRIDE (IP): Mod: JZ | Performed by: PEDIATRICS

## 2025-04-29 PROCEDURE — 700105 HCHG RX REV CODE 258: Performed by: PEDIATRICS

## 2025-04-29 RX ORDER — LEVOFLOXACIN 500 MG/1
500 TABLET, FILM COATED ORAL EVERY 24 HOURS
Status: DISCONTINUED | OUTPATIENT
Start: 2025-04-29 | End: 2025-04-30 | Stop reason: HOSPADM

## 2025-04-29 RX ADMIN — ONDANSETRON 8 MG: 2 INJECTION INTRAMUSCULAR; INTRAVENOUS at 08:46

## 2025-04-29 RX ADMIN — Medication 1000 UNITS: at 05:38

## 2025-04-29 RX ADMIN — BLINATUMOMAB 28 MCG: KIT INTRAVENOUS at 17:22

## 2025-04-29 RX ADMIN — DASATINIB 70 MG: 70 TABLET ORAL at 17:22

## 2025-04-29 RX ADMIN — ANTACID TABLETS 1500 MG: 500 TABLET, CHEWABLE ORAL at 08:46

## 2025-04-29 RX ADMIN — DASATINIB 70 MG: 70 TABLET ORAL at 05:38

## 2025-04-29 RX ADMIN — LORAZEPAM 1 MG: 2 LIQUID ORAL at 11:57

## 2025-04-29 RX ADMIN — CEFEPIME 2 G: 2 INJECTION, POWDER, FOR SOLUTION INTRAVENOUS at 05:38

## 2025-04-29 RX ADMIN — LEVOFLOXACIN 500 MG: 500 TABLET, FILM COATED ORAL at 11:53

## 2025-04-29 RX ADMIN — ANTACID TABLETS 1500 MG: 500 TABLET, CHEWABLE ORAL at 17:18

## 2025-04-29 ASSESSMENT — PAIN DESCRIPTION - PAIN TYPE
TYPE: ACUTE PAIN
TYPE: ACUTE PAIN

## 2025-04-29 NOTE — DISCHARGE PLANNING
Case Management Discharge Planning    Admission Date: 4/24/2025  GMLOS: 3.9  ALOS: 5    6-Clicks ADL Score: 24  6-Clicks Mobility Score: 24      Anticipated Discharge Dispo: Discharge Disposition: Discharged to home/self care (01)    DME Needed: No    Action(s) Taken: Per Peds Oncology, patient may be able to discharge tomorrow. RNCM scheduled [facility] at [facility] on 4/30 at 1600 for Blincyto bag change.     Escalations Completed: None    Medically Clear: No    Next Steps: pending medical clearance     Barriers to Discharge: Medical clearance    Is the patient up for discharge tomorrow: Yes    Is transport arranged for discharge disposition: No

## 2025-04-29 NOTE — DIETARY
Nutrition Services: Follow-up for PO Intake   Day 5 of admit. Tomas Jean-Baptiste is a 24 y.o. male with admitting DX of Acute lymphoid leukemia in remission (HCC) [C91.01]    Objective:  No new weights  Last BM 4/27  Fluid accumulation: 1+ BLE edema  Skin/Wounds: No pressure injuries documented    Subjective:  Telehealth visit - call placed to pt's cell phone number listed in EMR; not answered. Per flowsheets, pt with inadequate intakes:  0% intake x 1 meal  <25% intake x 1 meal  Will continue Ensure Plus High Protein TID to aid in meeting nutrition needs.    Current diet order:   Regular diet  Ensure Plus High Protein (provides 350 calories, 20 g protein per 8 fl oz) TID     Malnutrition risk: Unable to fully assess at this time     Nutrition Dx: Inadequate oral intake related to decreased ability to consume sufficient energy as evidenced by po intakes <50% of meals.     Nutrition Dx Status: New    Problem: Nutritional:  Goal: Achieve adequate nutritional intake  Description: Patient will consume >50% of meals  Outcome: Not Met      Plan/ Recommendations:      Encourage intake of meals, goal for >50% consumption from meals and/or supplements  Continue Ensure Plus High Protein TID  Document intake of all meals as % taken in ADLs to provide interdisciplinary communication across all shifts.   Monitor weight.  Nutrition rep available to see patient for ongoing meal and snack preferences.     RD following

## 2025-04-29 NOTE — PROGRESS NOTES
"Pharmacy Chemotherapy Verification Note:    Dx: relapsed B-ALL (ph+)        Protocol: Blincyto + TKI Consolidation     Blinatumomab (Blincyto) 28 mcg IV continuous infusion over 24 hrs daily on Days 1-28  Dasatinib 140 mg PO daily on Days 1-42 (or ponatinib 15 mg PO daily on Days 1-42)   - on Dasatinib 70 mg BID since induction (POM)  42-day cycle until transplant, disease progression, or unacceptable toxicity  NCCN Guidelines for ALL. V.2.2024.  Meg DUMAS et al. NEJM. 2020;383(17):1613-23.  david Quinones al. Lancet Haematol. 2023;10(1):e24-e34.    Allergies:  Amoxicillin     /63   Pulse 93   Temp 36.7 °C (98 °F) (Temporal)   Resp 18   Ht 1.651 m (5' 5\")   Wt 66.8 kg (147 lb 4.3 oz)   SpO2 94%   BMI 24.51 kg/m²  Body surface area is 1.75 meters squared.    All  labs  reviewed 4/29/25. No hold parameters for day 6.      Drug Order   (Drug name, dose, route, IV Fluid & volume, frequency, number of doses) Cycle: 1, Day 6 of 28      Previous treatment: s/p 3-Drug reinduction, D29 LP on 4/4/25     Medication = blinatumomab (Blincyto)  Base Dose = 28 mcg/day fixed dose  Calc Dose: Base Dose x 1d = 28 mcg  Final Dose = 28 mg  Route = CIVI  Fluid & Volume =  mL (+ overfill)  Admin Duration = Over 24 hrs via CADD pump @ 10 mL/hr Days 1-28  Bag size may change to accommodate up to 7 day infusion      <10% difference, okay to treat with final dose     By my signature below, I confirm this process was performed independently with the BSA and all final chemotherapy dosing calculations congruent. I have reviewed the above chemotherapy order and that my calculation of the final dose and BSA (when applicable) corroborate those calculations of the  pharmacist.     Galen Wilson, PharmD    "

## 2025-04-29 NOTE — CARE PLAN
The patient is Watcher - Medium risk of patient condition declining or worsening    Shift Goals  Clinical Goals: monitor nuero status and VS, continue to tolerate chemo  Patient Goals: rest, sleep  Family Goals: n/a    Progress made toward(s) clinical / shift goals:    Problem: Knowledge Deficit - Standard  Goal: Patient and family/care givers will demonstrate understanding of plan of care, disease process/condition, diagnostic tests and medications  Outcome: Progressing     Problem: Pain - Standard  Goal: Alleviation of pain or a reduction in pain to the patient’s comfort goal  Outcome: Progressing     Problem: Fall Risk  Goal: Patient will remain free from falls  Outcome: Progressing       Patient is not progressing towards the following goals:

## 2025-04-29 NOTE — PROGRESS NOTES
Chemotherapy Verification - PRIMARY RN      Height = 165.1 cm  Weight = 66.8 kg  BSA = 1.75 m2       Medication: blinatumomab  Dose: 28 mcg/day  Calculated Dose: set dose                             (Set dose)       I confirm this process was performed independently with the BSA and all final chemotherapy dosing calculations congruent.  Any discrepancies of 10% or greater have been addressed with the chemotherapy pharmacist. The resolution of the discrepancy has been documented in the EPIC progress notes.

## 2025-04-29 NOTE — PROGRESS NOTES
PEDIATRIC BEHAVIORAL HEALTH VISIT    ADULT REQUEST FOR CONFIDENTIALITY    Name:  Tomas Jean-Baptiste  MRN:  [id number]  :  2000  Age:  24 y.o.  Referring Provider: Dr. Faye (Hem/Onc)  Pediatrician:  Margarito Arvizu M.D.  Date of Service:  2025    Persons in Attendance: Tomas oRy     Chief Complaint/ Reason for Appointment: JULY, a 25 y/o male now in treatment for relapsed [place] Chromosome Precursor B-Cell Acute Lymphoblastic Leukemia was previously referred to counseling to assist in decreasing anxiety he has been experiencing and processing ways for how he can find himself now and what life will look like. See intake note dated 23. With his current relapse, psychology is meeting with JULY to process and cope with how it is impacting his life.     Mental Status Exam:   General description cooperative with interview  Interactional style Culturally appropriate  Eye contact Appropriate  Speech Unimpaired, fluid and clear, normal rate and rythem  Motor activity Average  Orientation Oriented to person time, place and situation  Intellectual functioning Unimpaired  Memory Unimpaired  Attention and concentration Intact and normative concentration  Fund of knowledge Average  Mood Euthymic to Tearful  Affect Appropriate   Perceptual Disturbances None apparent  Thought Process  No abnormalities apparent       Associations Unimpaired associations       Abstractions Normal abstractions, intact       Insight Insight - adequate and normative       Judgment Judgments - intact and normative   Thought Content  No apparent delusions    Risk Assessment:  Tomas Roy did not report current concerns regarding risk to self or others.       Issues Discussed:   This provider met with JULY to continue processing the grief he is experiencing around his relapse as well as the estrangement of his mother and siblings. Today we spent the visit processing the impact of this journey on his life and  relationships, ways to better communicate, and fears he has around transplant and the unknown. Reviewed ways to increase communication with his girlfriend as well as how to be present, but also plan for a future.     Techniques and Interventions Used: Psycho-education and Cognitive Behavioral Therapy (CBT)      Treatment Recommendations and Plan:  JULY, a 23 y/o male currently in treatment for relapsed [place] Chromosome Precursor B-Cell Acute Lymphoblastic Leukemia was previously referred to counseling to assist in decreasing anxiety he had been experiencing and processing ways to find himself now and what life will look like. After meeting with JULY during his intake, it appears he could benefit from learning anxiety management skills, processing what he has been through, and how to live the life he wants. In treatment, Tomas Jean-Baptiste benefited from learning appropriate ways of expressing and coping with his emotions, learning Cognitive Behavioral Therapy (CBT) and Acceptance and Commitment Therapy (ACT) tools to understand the interaction of thoughts, feelings, and actions, as well as Trauma Focused-CBT to process his cancer journey. Currently, he could benefit from processing how relapse is impacting his life and relationships and how the past is influencing him.       PLAN  JULY will learn and utilize appropriate ways to express and cope with emotions.    He will learn the connection between thoughts, feelings, and actions utilizing CBT and ACT tools.,   JULY will process how their medical diagnosis is impacting their life.    This provider will plan to meet with JULY Wednesday late morning.     The above diagnostic impressions, recommendations, and treatment plan were discussed with and agreed upon by Tomas Roy, and his caregivers. Care will be coordinated with Tomas Roy's healthcare team, as appropriate.    Total time spent on encounter was 90 minutes.    Laurie Ortega  PhD  Pediatric Psychologist   Licensed Psychologist, [place] # [id number]  [facility], Behavioral Health

## 2025-04-30 ENCOUNTER — OUTPATIENT INFUSION SERVICES (OUTPATIENT)
Dept: ONCOLOGY | Facility: MEDICAL CENTER | Age: 25
End: 2025-04-30
Attending: PEDIATRICS
Payer: COMMERCIAL

## 2025-04-30 ENCOUNTER — PHARMACY VISIT (OUTPATIENT)
Dept: PHARMACY | Facility: MEDICAL CENTER | Age: 25
End: 2025-04-30
Payer: COMMERCIAL

## 2025-04-30 VITALS
TEMPERATURE: 97.9 F | WEIGHT: 149.91 LBS | DIASTOLIC BLOOD PRESSURE: 75 MMHG | HEART RATE: 123 BPM | BODY MASS INDEX: 24.98 KG/M2 | OXYGEN SATURATION: 98 % | HEIGHT: 65 IN | RESPIRATION RATE: 18 BRPM | SYSTOLIC BLOOD PRESSURE: 127 MMHG

## 2025-04-30 VITALS
BODY MASS INDEX: 24.54 KG/M2 | OXYGEN SATURATION: 96 % | RESPIRATION RATE: 18 BRPM | HEIGHT: 65 IN | SYSTOLIC BLOOD PRESSURE: 115 MMHG | DIASTOLIC BLOOD PRESSURE: 71 MMHG | TEMPERATURE: 98 F | HEART RATE: 103 BPM | WEIGHT: 147.27 LBS

## 2025-04-30 DIAGNOSIS — C91.Z0 B LYMPHOBLASTIC LEUKEMIA WITH T(9;22)(Q34;Q11.2);BCR-ABL1 (HCC): ICD-10-CM

## 2025-04-30 LAB
ALBUMIN SERPL BCP-MCNC: 3.5 G/DL (ref 3.2–4.9)
ALBUMIN/GLOB SERPL: 1.2 G/DL
ALP SERPL-CCNC: 66 U/L (ref 30–99)
ALT SERPL-CCNC: 30 U/L (ref 2–50)
ANION GAP SERPL CALC-SCNC: 10 MMOL/L (ref 7–16)
AST SERPL-CCNC: 36 U/L (ref 12–45)
BACTERIA BLD CULT: NORMAL
BASOPHILS # BLD AUTO: 0.3 % (ref 0–1.8)
BASOPHILS # BLD: 0.03 K/UL (ref 0–0.12)
BILIRUB SERPL-MCNC: 0.4 MG/DL (ref 0.1–1.5)
BUN SERPL-MCNC: 7 MG/DL (ref 8–22)
CALCIUM ALBUM COR SERPL-MCNC: 9.1 MG/DL (ref 8.5–10.5)
CALCIUM SERPL-MCNC: 8.7 MG/DL (ref 8.5–10.5)
CHLORIDE SERPL-SCNC: 104 MMOL/L (ref 96–112)
CO2 SERPL-SCNC: 25 MMOL/L (ref 20–33)
CREAT SERPL-MCNC: 0.45 MG/DL (ref 0.5–1.4)
EOSINOPHIL # BLD AUTO: 0.01 K/UL (ref 0–0.51)
EOSINOPHIL NFR BLD: 0.1 % (ref 0–6.9)
ERYTHROCYTE [DISTWIDTH] IN BLOOD BY AUTOMATED COUNT: 60 FL (ref 35.9–50)
GFR SERPLBLD CREATININE-BSD FMLA CKD-EPI: 151 ML/MIN/1.73 M 2
GLOBULIN SER CALC-MCNC: 2.9 G/DL (ref 1.9–3.5)
GLUCOSE SERPL-MCNC: 83 MG/DL (ref 65–99)
HCT VFR BLD AUTO: 31 % (ref 42–52)
HGB BLD-MCNC: 9.3 G/DL (ref 14–18)
IMM GRANULOCYTES # BLD AUTO: 0.13 K/UL (ref 0–0.11)
IMM GRANULOCYTES NFR BLD AUTO: 1.2 % (ref 0–0.9)
LYMPHOCYTES # BLD AUTO: 3.33 K/UL (ref 1–4.8)
LYMPHOCYTES NFR BLD: 30.7 % (ref 22–41)
MCH RBC QN AUTO: 28.2 PG (ref 27–33)
MCHC RBC AUTO-ENTMCNC: 30 G/DL (ref 32.3–36.5)
MCV RBC AUTO: 93.9 FL (ref 81.4–97.8)
MONOCYTES # BLD AUTO: 0.81 K/UL (ref 0–0.85)
MONOCYTES NFR BLD AUTO: 7.5 % (ref 0–13.4)
NEUTROPHILS # BLD AUTO: 6.54 K/UL (ref 1.82–7.42)
NEUTROPHILS NFR BLD: 60.2 % (ref 44–72)
NRBC # BLD AUTO: 0.04 K/UL
NRBC BLD-RTO: 0.4 /100 WBC (ref 0–0.2)
PLATELET # BLD AUTO: 380 K/UL (ref 164–446)
PMV BLD AUTO: 8.7 FL (ref 9–12.9)
POTASSIUM SERPL-SCNC: 3.5 MMOL/L (ref 3.6–5.5)
PROT SERPL-MCNC: 6.4 G/DL (ref 6–8.2)
RBC # BLD AUTO: 3.3 M/UL (ref 4.7–6.1)
SIGNIFICANT IND 70042: NORMAL
SITE SITE: NORMAL
SODIUM SERPL-SCNC: 139 MMOL/L (ref 135–145)
SOURCE SOURCE: NORMAL
WBC # BLD AUTO: 10.9 K/UL (ref 4.8–10.8)

## 2025-04-30 PROCEDURE — 85025 COMPLETE CBC W/AUTO DIFF WBC: CPT

## 2025-04-30 PROCEDURE — 700102 HCHG RX REV CODE 250 W/ 637 OVERRIDE(OP): Performed by: PEDIATRICS

## 2025-04-30 PROCEDURE — A9270 NON-COVERED ITEM OR SERVICE: HCPCS | Performed by: PEDIATRICS

## 2025-04-30 PROCEDURE — 80053 COMPREHEN METABOLIC PANEL: CPT

## 2025-04-30 PROCEDURE — 99239 HOSP IP/OBS DSCHRG MGMT >30: CPT | Performed by: PEDIATRICS

## 2025-04-30 PROCEDURE — RXMED WILLOW AMBULATORY MEDICATION CHARGE: Performed by: PEDIATRICS

## 2025-04-30 PROCEDURE — G0498 CHEMO EXTEND IV INFUS W/PUMP: HCPCS

## 2025-04-30 PROCEDURE — 700101 HCHG RX REV CODE 250: Mod: UD | Performed by: PEDIATRICS

## 2025-04-30 PROCEDURE — 700111 HCHG RX REV CODE 636 W/ 250 OVERRIDE (IP): Performed by: PEDIATRICS

## 2025-04-30 RX ORDER — LEVOFLOXACIN 500 MG/1
500 TABLET, FILM COATED ORAL EVERY 24 HOURS
Qty: 30 TABLET | Refills: 0 | Status: ACTIVE | OUTPATIENT
Start: 2025-05-01 | End: 2025-05-30 | Stop reason: SDUPTHER

## 2025-04-30 RX ADMIN — Medication 1000 UNITS: at 05:22

## 2025-04-30 RX ADMIN — SODIUM CHLORIDE 196 MCG: 9 INJECTION INTRAMUSCULAR; INTRAVENOUS; SUBCUTANEOUS at 17:37

## 2025-04-30 RX ADMIN — CYPROHEPTADINE HYDROCHLORIDE 4 MG: 4 TABLET ORAL at 14:51

## 2025-04-30 RX ADMIN — DASATINIB 70 MG: 70 TABLET ORAL at 05:22

## 2025-04-30 RX ADMIN — ANTACID TABLETS 1500 MG: 500 TABLET, CHEWABLE ORAL at 11:38

## 2025-04-30 RX ADMIN — LEVOFLOXACIN 500 MG: 500 TABLET, FILM COATED ORAL at 05:22

## 2025-04-30 RX ADMIN — ANTACID TABLETS 1500 MG: 500 TABLET, CHEWABLE ORAL at 08:05

## 2025-04-30 ASSESSMENT — FIBROSIS 4 INDEX: FIB4 SCORE: 0.4

## 2025-04-30 ASSESSMENT — PAIN DESCRIPTION - PAIN TYPE: TYPE: ACUTE PAIN

## 2025-04-30 NOTE — CARE PLAN
The patient is Stable - Low risk of patient condition declining or worsening    Shift Goals  Clinical Goals: Neuro checks, pain control, tolerate chemo  Patient Goals: Discharge  Family Goals: n/a    Progress made toward(s) clinical / shift goals:       Problem: Knowledge Deficit - Standard  Goal: Patient and family/care givers will demonstrate understanding of plan of care, disease process/condition, diagnostic tests and medications  Description: Target End Date:  1-3 days or as soon as patient condition allowsDocument in Patient Education1.  Patient and family/caregiver oriented to unit, equipment, visitation policy and means for communicating concern2.  Complete/review Learning Assessment3.  Assess knowledge level of disease process/condition, treatment plan, diagnostic tests and medications4.  Explain disease process/condition, treatment plan, diagnostic tests and medications  Outcome: Progressing  Note: Patient understands the need for tolerating chemo. Patient understands the importance of Q4 neuro checks.

## 2025-04-30 NOTE — PROGRESS NOTES
Chemotherapy Verification - SECONDARY RN       Height = 1.65m  Weight = 68kg  BSA = 1.77m2       Medication: blinatumomab   Dose: 28mcg/day fixed dose over 7 days Calculated Dose: 196mcg                             (In mg/m2, AUC, mg/kg)       I confirm that this process was performed independently.

## 2025-04-30 NOTE — CARE PLAN
The patient is Watcher - Medium risk of patient condition declining or worsening    Shift Goals  Clinical Goals: monitor nuero, tolerate chemo  Patient Goals: sleep  Family Goals: n/a    Progress made toward(s) clinical / shift goals:    Problem: Knowledge Deficit - Standard  Goal: Patient and family/care givers will demonstrate understanding of plan of care, disease process/condition, diagnostic tests and medications  Outcome: Progressing     Problem: Pain - Standard  Goal: Alleviation of pain or a reduction in pain to the patient’s comfort goal  Outcome: Progressing     Problem: Fall Risk  Goal: Patient will remain free from falls  Outcome: Progressing       Patient is not progressing towards the following goals:

## 2025-04-30 NOTE — PROGRESS NOTES
Chemotherapy Verification - PRIMARY RN      Height = 1.65 m  Weight = 68 kg  BSA = 1.77 m2       Medication: blinatumomab  Dose: 28 mcg/day set dose over 7 days. Calculated Dose: 196 mcg.                              (In mg/m2, AUC, mg/kg)         I confirm this process was performed independently with the BSA and all final chemotherapy dosing calculations congruent.  Any discrepancies of 10% or greater have been addressed with the chemotherapy pharmacist. The resolution of the discrepancy has been documented in the EPIC progress notes.

## 2025-04-30 NOTE — PROGRESS NOTES
"Pharmacy Chemotherapy Verification Note:    Dx: relapsed B-ALL (ph+)        Protocol: Blincyto + TKI Consolidation     Blinatumomab (Blincyto) 28 mcg IV continuous infusion over 24 hrs daily on Days 1-28  Dasatinib 140 mg PO daily on Days 1-42 (or ponatinib 15 mg PO daily on Days 1-42)   - on Dasatinib 70 mg BID since induction (POM)  42-day cycle until transplant, disease progression, or unacceptable toxicity  NCCN Guidelines for ALL. V.2.2024.  Meg DUMAS et al. NEJM. 2020;383(17):1613-23.  david Quinones al. Lancet Haematol. 2023;10(1):e24-e34.    Allergies:  Amoxicillin     /75   Pulse (!) 123   Temp 36.6 °C (97.9 °F) (Temporal)   Resp 18   Ht 1.65 m (5' 4.96\")   Wt 68 kg (149 lb 14.6 oz)   SpO2 98%   BMI 24.98 kg/m²  Body surface area is 1.77 meters squared.    All  labs  reviewed 4/30/25. No hold parameters for day 7.      Drug Order   (Drug name, dose, route, IV Fluid & volume, frequency, number of doses) Cycle: 1, Day 7-13 of 28      Previous treatment: s/p 3-Drug reinduction, D29 LP on 4/4/25     Medication = blinatumomab (Blincyto)  Base Dose = 28 mcg/day fixed dose  Calc Dose: Base Dose x 7d = 196 mcg  Final Dose = 28 mg  Route = CIVI  Fluid & Volume =  mL (+ overfill)  Admin Duration = Over 168hrs via CADD pump @ 0.6 mL/hr Days 1-28  Bag size may change to accommodate up to 7 day infusion      <10% difference, okay to treat with final dose     By my signature below, I confirm this process was performed independently with the BSA and all final chemotherapy dosing calculations congruent. I have reviewed the above chemotherapy order and that my calculation of the final dose and BSA (when applicable) corroborate those calculations of the  pharmacist.     Galen Wilsno, PharmD    "

## 2025-04-30 NOTE — CARE PLAN
Problem: Knowledge Deficit - Standard  Goal: Patient and family/care givers will demonstrate understanding of plan of care, disease process/condition, diagnostic tests and medications  Outcome: Progressing     Problem: Pain - Standard  Goal: Alleviation of pain or a reduction in pain to the patient’s comfort goal  Outcome: Progressing     The patient is Watcher - Medium risk of patient condition declining or worsening    Shift Goals  Clinical Goals: monitor nuero status and VS, continue to tolerate chemo  Patient Goals: rest, sleep  Family Goals: n/a    Progress made toward(s) clinical / shift goals:  Pt updated on POC    Patient is not progressing towards the following goals:

## 2025-05-01 LAB
BACTERIA BLD CULT: NORMAL
SIGNIFICANT IND 70042: NORMAL
SITE SITE: NORMAL
SOURCE SOURCE: NORMAL

## 2025-05-01 NOTE — PROGRESS NOTES
JULY into infusion for blincyto pump exchange.   CVC dual lumen subclavian;  pump stopped and disconnected; patient received 236.3 mLs of blincyto. Per policy pulled back and wasted 10 mLs blood from each lumen, each lumen with brisk blood return and flushed with 10 mLs NS after wasting blood.   Dressing and claves changed, new blincyto CADD pump hooked up. Patient watched video and signed consent for CADD pump.  Verified settings and all clamps open, pump in run mode and placed in carrying bad. JULY verified appointment and off unit in [facility].

## 2025-05-01 NOTE — PROGRESS NOTES
Pediatric Hematology/Oncology   Progress Note    Patient Name:  Tomas Jean-Baptiste  : 2000   MRN: [id number]    Location of Service: [facility]  Date of Service: 2025  Time: 9:00 AM    Primary Care Physician: Margarito Arvizu M.D.    Protocol / Therapy Plan: Individualized Immunotherapy to Bridge to Transplant, Blinatumomab (+Dasatinib) Block 1, Day 7    OVERNIGHT:     Afebrile with Tmax 98.8 °F.  This morning,  Tomas Roy reports that he is clinically much improved.  He denies any significant complaints.  No complaints of any headaches, changes in vision or neurologic changes.  Not complaining of any headaches.  No complaints of any nausea, vomiting or diarrhea.  Does however have constipation.  No abdominal discomfort or pain.  Reports that energy has improved.  Eating and drinking well.  Mood has improved considerably.  Is very excited for the possibility of transplant being moved up.  There are no complaints of any skin changes or rashes.  No easy bruising or bleeding.  No other concerns or complaints at this time.    Review of Systems:     Constitutional: Afebrile with Tmax 98.8 °F.  Continues to improve clinically.  Does not have any concerns at this time and feels ready for discharge.  Good energy.  Good appetite.  HENT: Negative for auditory changes, nosebleeds and sore throat.  No mouth sores.  Eyes: Negative for visual changes.  Respiratory: No shortness of breath or respiratory distress.  No hypoxemia.  No oxygen requirement.  Cardiovascular: Negative.  Gastrointestinal: Negative vomiting, abdominal pain, diarrhea.  No nausea.  Constipation.  Genitourinary: Negative.  Musculoskeletal: No musculoskeletal complaints today.  Skin: Negative for rash, signs of infection.  Neurological: No tremor.  No neurologic symptoms.  Mild headache.  Endo/Heme/Allergies: Does not bruise/bleed easily.    Psychiatric/Behavioral: No changes in mood, appropriate for age.   OBJECTIVE:  "    Tmax:  Temp (24hrs), Av.9 °C (98.4 °F), Min:36.6 °C (97.9 °F), Max:37.1 °C (98.8 °F)    Vitals:   /71   Pulse (!) 103   Temp 36.7 °C (98 °F) (Temporal)   Resp 18   Ht 1.651 m (5' 5\")   Wt 66.8 kg (147 lb 4.3 oz)   SpO2 96%     Labs:     Latest Reference Range & Units 25 08:05   WBC 4.8 - 10.8 K/uL 10.9 (H)   RBC 4.70 - 6.10 M/uL 3.30 (L)   Hemoglobin 14.0 - 18.0 g/dL 9.3 (L)   Hematocrit 42.0 - 52.0 % 31.0 (L)   MCV 81.4 - 97.8 fL 93.9   MCH 27.0 - 33.0 pg 28.2   MCHC 32.3 - 36.5 g/dL 30.0 (L)   RDW 35.9 - 50.0 fL 60.0 (H)   Platelet Count 164 - 446 K/uL 380   MPV 9.0 - 12.9 fL 8.7 (L)   Neutrophils-Polys 44.00 - 72.00 % 60.20   Neutrophils (Absolute) 1.82 - 7.42 K/uL 6.54   Lymphocytes 22.00 - 41.00 % 30.70   Lymphs (Absolute) 1.00 - 4.80 K/uL 3.33   Monocytes 0.00 - 13.40 % 7.50   Monos (Absolute) 0.00 - 0.85 K/uL 0.81   Eosinophils 0.00 - 6.90 % 0.10   Eos (Absolute) 0.00 - 0.51 K/uL 0.01   Basophils 0.00 - 1.80 % 0.30   Baso (Absolute) 0.00 - 0.12 K/uL 0.03   Immature Granulocytes 0.00 - 0.90 % 1.20 (H)   Immature Granulocytes (abs) 0.00 - 0.11 K/uL 0.13 (H)   Nucleated RBC 0.00 - 0.20 /100 WBC 0.40 (H)   NRBC (Absolute) K/uL 0.04   Sodium 135 - 145 mmol/L 139   Potassium 3.6 - 5.5 mmol/L 3.5 (L)   Chloride 96 - 112 mmol/L 104   Co2 20 - 33 mmol/L 25   Anion Gap 7.0 - 16.0  10.0   Glucose 65 - 99 mg/dL 83   Bun 8 - 22 mg/dL 7 (L)   Creatinine 0.50 - 1.40 mg/dL 0.45 (L)   GFR (CKD-EPI) >60 mL/min/1.73 m 2 151   Calcium 8.5 - 10.5 mg/dL 8.7   Correct Calcium 8.5 - 10.5 mg/dL 9.1   AST(SGOT) 12 - 45 U/L 36   ALT(SGPT) 2 - 50 U/L 30   Alkaline Phosphatase 30 - 99 U/L 66   Total Bilirubin 0.1 - 1.5 mg/dL 0.4   Albumin 3.2 - 4.9 g/dL 3.5   Total Protein 6.0 - 8.2 g/dL 6.4   Globulin 1.9 - 3.5 g/dL 2.9   A-G Ratio g/dL 1.2   (H): Data is abnormally high  (L): Data is abnormally low    Physical Exam:    Constitutional: Well-developed, well-nourished, and in no distress.  Well-appearing.  HENT: " Normocephalic and atraumatic. No nasal congestion or rhinorrhea. Oropharynx is clear and moist. No oral ulcerations or sores.    Eyes: Conjunctivae are normal. Pupils are equal, round.  EOMI.  Nonicteric.  Glasses.  Neck: Normal range of motion of neck, no adenopathy.    Cardiovascular: Normal rate, regular rhythm and normal heart sounds.  No murmur heard. DP/radial pulses 2+, cap refill < 2 sec.  Pulmonary/Chest: Effort normal and breath sounds normal. No respiratory distress. Symmetric expansion.  No crackles or wheezes.  Abdomen: Soft. Bowel sounds are normal. No distension and no mass. There is no hepatosplenomegaly.    Genitourinary:  Deferred.  Musculoskeletal: Normal range of motion of lower and upper extremities bilaterally. No tenderness to palpation of elbows, wrists, hands, knees, ankles and feet bilaterally.  Lesion on right medial thigh has improved in size and tenderness again today.  Lymphadenopathy: No cervical adenopathy.  Neurological: Alert and oriented to person and place. Exhibits normal muscle tone bilaterally in upper and lower extremities. Gait not assessed. Coordination normal.    Skin: Skin is warm, dry and pink.  No rash or evidence of skin infection.  No pallor.   Psychiatric: Mood and affect normal for age.    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 Precipitous  drop of counts just prior to dx with mildly elevated temperatures and bone pain had concern for relapsed leukemia. 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 Likely plan for HSCT, search for MUD ongoing. After discussing multiple options, they decided on a 3-Drug Re-Induction (VCR/PRED/PEG-ASP) +Imatinib followed by blinatumomab. 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 Met virtually with Dr. Adrian, [facility] 3/20/2025     - Has had discussions with both bone marrow transplant as well as some therapeutics (CAR-T) at [facility].  Complete remission after re-induction, making CAR-T ineligible  - End of Reinduction evaluations demonstrating phenotypically similar aberrant lymphoblast population of 0.005% (however lacking CD19 or CD22)   - Repeat BM at [facility] confirming remission    - Given CR2, will pursue transplantation     Individualized Bridging Therapy with Blinatumomab, Day 7:  ** Methotrexate 15 mg IT x 1 on Days 1 (COMPLETED at End of Re-Induction, see separate procedure note)  ** Dexamethasone prior to starting blinatumomab, 12 mg PO x 1 (COMPLETED)  ** Blinatumomab 28 mCg per day IV continuous infusion for 28 days, started on 4/25/2025. 24-hour infusion until 4/26/2025 (COMPLETE).  24-hour infusion until 4/27/2025 (COMPLETE).  24-hour infusion until 4/28/2025 (COMPLETE). 24-hour infusion until 4/29/2025 (COMPLETE)   - Will discharge patient today for 7-day  blinatumomab cassette and [facility]    ** [facility] scheduled today for 4 PM  ** Monitor for CRS, monitor for neurologic side effects while inpatient (tremors, seizures)      ** Continue Dasatinib 70 mg PO BID, started 3/22/2025     2) Bone Marrow Transplant Candidate:   - Given High Risk relapse of Ph+ B-cell ALL and current remission status, plan for consolidating therapy with blinatumomab followed by bone marrow transplantation   - Has already consulted with [facility] Bone Marrow Transplant Team (Dr. Ximena Adrian)   - Will be offered haplo transplantation with father as donor   - Tentatively planned for 2025     - Pretransplant workup to include:    *HLA Antibody screen (vials supplied by [facility])    *Confirmatory HLA typing (vials supplied by [facility])    *RVP/COVID swab 2025    *CT Sinus, Chest, Abdomen, Pelvis (with contrast)    *Echocardiogram with LVEF    *EKG    *PFTs with [facility] -SCHEDULED    3) Clinical Sepsis: (RESOLVED)   - Fever at around the time of blinatumomab start   - Blood cultures negative to date   - Transitioned to oral levofloxacin 500 mg daily, will continue at home, Rx provided   - Only possible source identified at this time is right medial thigh region.   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 No  clinical lesions  - Chlorhexidine mouthwash 4 times daily  - Levofloxacin 500 mg PO daily     6) History of Rothia mucilaginosa Bacteremia:              - Blood cultures obtained on presentation 3/22/2025 from CVL (both lumens) with Rothia mucilaginosa               - 3/25/25 Blood Cx negative to date              - Was placed on empiric cefepime and Flagyl upon admission               - Vancomycin started for Rothia on 3/23/2025              - Completed therapy and discontinued Cefepime, Flagyl and Vancomycin on 4/4/2025   - See nodule in thigh below     7) FEN/GI:  - Regular diet     8) Nodule, Right Medial Thigh: (IMPROVED WITH ANTIBIOTICS)  - Tender and palpable  - Monitor for fever  - Consider possible infectious etiology  - Blood cultures remain negative  - US Soft Tissue demonstrating hypoechoic lesion possibly fat necrosis, inflammatory process or infection    9) At Risk For Nausea and Vomiting Secondary To Therapy             - Zofran changed to PRN              - Scopolamine patch every 3 days             - Ativan IV PRN for breakthrough N/V available     10) Transaminitis Secondary To Therapy: (RESOLVED)  - AST: 36 U/L, ALT: 30 U/L  - Bilirubin total 0.4 mg/dL  - Will continue to monitor     11) At Risk For Hypovitaminosis D:             - Continue Vitamin D3 supplement daily     12) Central Access:  - Double-lumen CVL placed 2/28/2025 by surgery  - Saline flush per protocol     13) Psychosocial:  - Dr. Ortega following     14) Social:              - Referred to Social Work and financial navigator     Disposition: Discharge to [facility] for 7-day bag change.  Will return to [facility] in 7 days.  Will obtain outpatient pretransplantation workup.    Pepe Faye MD  Pediatric Hematology / Oncology  [facility]  Cell.  [phone]  Office. [phone]

## 2025-05-01 NOTE — DOCUMENTATION QUERY
"                                                                         On license of [facility]                                                                       Query Response Note      PATIENT:               REBECA CHEEMA  ACCT #:                  [id number]  MRN:                     [id number]  :                      2000  ADMIT DATE:       2025 10:46 AM  DISCH DATE:        2025 3:38 PM  RESPONDING  PROVIDER #:        [id number]           QUERY TEXT:    \"Pancytopenia secondary to leukemia and therapy\"  is documented in  progress note by Dr. Faye.  \" Normal platelet count\" is noted in the  progress note by Dr. Faye. Please clarify the pancytopenia diagnosis:    The patient's Clinical Indicators include:  Clinical Findings:  presents for scheduled chemotherapy/Immunotherapy     PN- Dr. Faye:  \" Pancytopenia Secondary to Leukemia and Therapy:  - WBC: 9100/microliters, Hemoglobin: 8.5 gm/dL following transfusion, platelets 434,000/microliters\"    - PN- Dr. 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 / Now with Luekocytosis and normal Platelet Count:  - WBC: 12,700/microliters, Hemoglobin: 9.0 gm/dL following transfusion, platelets 370,000/microliters\"    Labs:  Hemoglobin: 7.6- 7.3-6.4-8.6-8.5-9.0-9.3  Platelet count:  [phone]  WBC: 4.8-3.0-4.7-6.6-9.1-12.7-10.9  Neutrophils (absolute): 3.42-3.24-5.97-5.43-8.36-6.54    Risk factors: Acute Lymphoblastic Leukemia; chemotherapy/immunotherapy; blinatumomab    Treatment:  Labs; Transfused 1 U PRBC on     Please contact me with any questions:    Frieda VELAZQUEZ RN CCDS  CDI On license of [facility]  [email or web address]  Frieda Ryan via Voalte    Note: If you agree with a diagnosis listed above, please remember to include it in your concurrent daily documentation and onto the Discharge Summary.  Options provided:   -- Anemia secondary to leukemia and therapy; pancytopenia ruled out   -- Pancytopenia secondary to " leukemia and therapy ruled out   -- Pancytopenia  secondary to leukemia and therapy - a condition that was treated and monitored, Please provide clinical indicators supporting diagnosis   -- Other explanation, (please specify other explanation)      Query created by: Frieda Ryan on 4/30/2025 8:54 PM    RESPONSE TEXT:    Anemia secondary to leukemia and therapy; pancytopenia ruled out          Electronically signed by:  LADONNA MONAE MD 5/1/2025 10:31 AM

## 2025-05-01 NOTE — PROGRESS NOTES
PEDIATRIC BEHAVIORAL HEALTH VISIT    ADULT REQUEST FOR CONFIDENTIALITY    Name:  Tomas Jean-Baptiste  MRN:  [id number]  :  2000  Age:  24 y.o.  Referring Provider: Dr. Faye (Hem/Onc)  Pediatrician:  Margarito Arvizu M.D.  Date of Service:  2025    Persons in Attendance: Tomas Roy     Chief Complaint/ Reason for Appointment: JULY, a 23 y/o male now in treatment for relapsed [place] Chromosome Precursor B-Cell Acute Lymphoblastic Leukemia was previously referred to counseling to assist in decreasing anxiety he has been experiencing and processing ways for how he can find himself now and what life will look like. See intake note dated 23. With his current relapse, psychology is meeting with JULY to process and cope with how it is impacting his life.     Mental Status Exam:   General description cooperative with interview  Interactional style Culturally appropriate  Eye contact Appropriate  Speech Unimpaired, fluid and clear, normal rate and rythem  Motor activity Average  Orientation Oriented to person time, place and situation  Intellectual functioning Unimpaired  Memory Unimpaired  Attention and concentration Intact and normative concentration  Fund of knowledge Average  Mood Euthymic to Tearful  Affect Appropriate   Perceptual Disturbances None apparent  Thought Process  No abnormalities apparent       Associations Unimpaired associations       Abstractions Normal abstractions, intact       Insight Insight - adequate and normative       Judgment Judgments - intact and normative   Thought Content  No apparent delusions    Risk Assessment:  Tomas Roy did not report current concerns regarding risk to self or others.       Issues Discussed:   This provider met with JULY to continue processing the grief he is experiencing around his relapse as well as the estrangement of his mother and siblings. Today we spent the visit continuing to process feelings he has around transplant and the  unknown, his family and childhood, and some regret around not doing more in the last year such as travel. Reviewed the impact of his childhood, relationship with his mother, siblings and her side of the family, and ways he has grown over the last year.     Techniques and Interventions Used: Psycho-education and Cognitive Behavioral Therapy (CBT)      Treatment Recommendations and Plan:  JULY, a 23 y/o male currently in treatment for relapsed [place] Chromosome Precursor B-Cell Acute Lymphoblastic Leukemia was previously referred to counseling to assist in decreasing anxiety he had been experiencing and processing ways to find himself now and what life will look like. After meeting with JULY during his intake, it appears he could benefit from learning anxiety management skills, processing what he has been through, and how to live the life he wants. In treatment, Tomas Orthodox Nabeel Jena-Baptiste benefited from learning appropriate ways of expressing and coping with his emotions, learning Cognitive Behavioral Therapy (CBT) and Acceptance and Commitment Therapy (ACT) tools to understand the interaction of thoughts, feelings, and actions, as well as Trauma Focused-CBT to process his cancer journey. Currently, he could benefit from processing how relapse is impacting his life and relationships and how the past is influencing him.       PLAN  JULY will learn and utilize appropriate ways to express and cope with emotions.    He will learn the connection between thoughts, feelings, and actions utilizing CBT and ACT tools.,   JULY will process how their medical diagnosis is impacting their life.    This provider will plan to connect with JULY to determine if/when he would like another visit before transplant.     The above diagnostic impressions, recommendations, and treatment plan were discussed with and agreed upon by Tomas Roy, and his caregivers. Care will be coordinated with Tomas Roy's healthcare team, as  appropriate.    Total time spent on encounter was 60 minutes.    Laurie Ortega, PhD  Pediatric Psychologist   Licensed Psychologist, [place] # [id number]  [facility], Behavioral Health

## 2025-05-01 NOTE — DISCHARGE SUMMARY
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 recalculated and all doses adjusted to meet with new biometric data. Tomas Roy was seen again on 6/19/2023 for his Day 29 of Cycle 1 of Maintenance.  No dose adjustments were necessary as ANC was within target range.  On 7/17/2023, Tomas Roy returned to clinic for his Day 57 of Cycle 1 of Maintenance at which point he was actually feeling quite well clinically. No dose adjustments were necessary however his counts had started to drop at that point with WBC 0.9 and ANC dipping to 590.  On 7/27/2023, present Tomas Roy to clinic with nausea, vomiting, abdominal discomfort as well as decreased fatigue.  Blood counts obtained at the time demonstrated a WBC of 0.4, Hgb 8.8 and platelets 125.  ANC at the time was measured at only 170.  JULY was otherwise clinically well appearing.  He did receive a one-time normal saline bolus for his nausea and vomiting and was sent home with instructions to HOLD his oral mercaptopurine and oral  methotrexate which began being held on 7/28/2023.  Per protocol, imatinib was continued at previous dose of 600 mg in the morning. Tomas Roy would return to clinic again on 8/2/2023 and 8/7/2023.  On both occasions, ANC remained under 500 and oral therapy (with the exception of imatinib) continue to be held while awaiting count recovery.  Ultimately, on 8/11/2023 after 14 days of therapy being held, Tomas Roy returned to clinic and WBC had increased to 2.1 with ANC increasing to 1500 with an absolute monocyte count of 290. Tomas Roy was then instructed to resume his oral chemotherapy at 100% of prior dosing with (due to timing with Day 1 of Cycle 2 with LP 3 days later, no weekly MTX was administered).  Imatinib was never held.  On 8/14/2023 he was seen in clinic for Day 1 of Cycle 2 of Maintenance with lumbar puncture, vincristine, and 5-day pulse of steroids.  At the time, his ANC was 2010 and his oral chemotherapy was continued at 100% dosing.  Given his history of previously drop in counts, he was scheduled to come back for repeat labs on 8/29/2023 at which point his WBC count was 1.4 and his ANC remained greater than 500 at 1140.  Again, his therapy was continued with imatinib 550 mg by mouth in the morning as well as 100% dosing of methotrexate and 100% dosing of 6-mercaptopurine.  When Tomas Roy returned to clinic on 9/11/2023 for his Maintenance, Cycle 2, Day 29 therapy he was noted to have had another drop in his WBC to 600 and his ANC accordingly was also decreased at 410.  He did receive vincristine in accordance with protocol as well as starting a 5-day pulse of prednisone per protocol.  Given however that his ANC had dropped below 500 his oral methotrexate and oral 6-MP were again held.  He was instructed return to clinic 1 week later for lab evaluations.  On 9/19/2023 he returned to clinic and WBC had improved slightly to 0.8 however ANC remained low at 260 with an absolute  monocyte count of 220.  Given that counts not recovered his oral chemotherapy remained held for an additional week.  On 9/26/2023 at 14 days after initially holding chemotherapy, he was seen back in clinic at which time WBC improved again to 1.1 however ANC did not quite recover and remained at 490 with an absolute monocyte count of 330.  Given that 2 weeks had elapsed with myelosuppression, imatinib was held in addition to oral 6-MP and methotrexate. Tomas Roy was instructed to return to clinic on 9/29/2023 for repeat counts.  On 9/29/2023 WBC had improved to 2.4 and ANC had finally recovered with 1530 and an absolute monocyte count of 510.  Given a recovery with ANC greater than 750 and platelets greater than 75, oral chemotherapy was restarted at 50% of initial dosing and imatinib was restarted at 100% of initial dosing.  On 10/9/2023, Tomas Roy returned to clinic for his Day 57 of Cycle 2 visit.  At the time of his visit, his ANC had recovered after holding chemotherapy and then restarting at 50% dosing 11 days prior.  He did however in clinic spiked a temperature 102.5 °F.  For this reason despite his ANC being improved, no dose adjustments were made in his chemotherapy.  He continued with 50% dosing with 100% adherence of his 6-MP and methotrexate as well as his imatinib at 550 mg PO QHS.   Tomas Roy was then seen in clinic again on 11/6/2023 for his Day 1 of Cycle 3 of Maintenance therapy.  At the time, his imatinib dose was adjusted back up to 600 mg daily taken in the morning.  Additionally, his methotrexate was adjusted back up to 75% of expected dosing and his mercaptopurine was increased to 75% of expected dosing as well.  He will return to clinic on 12/4/2023 for his Day 29 of Cycle 3 therapy.  At the time, his ANC was exactly 1500 and therefore no dose adjustments were made and he continued at 75% dosing for oral chemotherapy as well as the imatinib dose of 600 mg daily.  On  "1/2/2024 he was seen for his Day 57 evaluations and his ANC had dropped to 1450 again not prompting any change in oral chemotherapy dosing.  Tomas Roy completed his Cycle 4 chemotherapy without any adverse events or complications.  He did not have any changes in medications either.  Most recently,  Tomas Roy was seen in clinic on 4/22/2023 for his Day 1 of Cycle 5 chemotherapy.  At the time, a lumbar puncture was performed and IT chemotherapy was provided.  He tolerated the procedure and the therapy well without any sequelae.  His ANC at the time was > 1500 however the dose adjustment was made as this was the first ANC greater than 1500 and Tomas Roy had a history of precipitous drops in his counts with increases in his oral chemotherapy.  On 5/20/2024, Tomas Roy presented to clinic for his Cycle 5, Day 29 therapy and evaluations. At that time, he was started on 5 day pulse of prednisone and his oral methotrexate dose was increased to 13 tablets PO weekly (90.78% of expected dosing). His port was removed on 5/20/2024 by Dr. Moise. He completed therapy on 5/30/2024.  Since his completion of therapy and poor removal, he has been seen in follow-up and was most recently seen on 1/15/2025 at which point there was no evidence of relapse or recurrence both clinically or in his labs.  Interval history however is remarkable for upper respiratory symptoms approximately 1-1/2 weeks ago.  At the time, he reported having some nausea and vomiting as well as an upset stomach and sore throat.  The symptoms were thought most likely to be viral and in fact improved consistent with viral illness.  On about 2/24/2025 however symptoms returned and were more flulike in nature with aches and pains and low-grade temperatures.  On the evening of 2/26/2025, JULY called Pediatric Hematology/Oncology to report that he \"feels like I am relapsing\". Tomas Roy was brought in to clinic today for evaluations.  In " clinic, CBC, CMP, respiratory viral panel and EBV studies were obtained.  Respiratory viral studies were negative.  CBC however demonstrated a white blood cell count of 1, hemoglobin 10.6, platelets of 53 without any circulating blasts.  His CMP for the most part was normal with mild hyponatremia.  Uric acid was 7.2 and an LDH of 244.  Given these lab values as well as a temperature of 100.6 while in clinic also in the context of an acute infection of his right fourth phalanx, decision was made to bring JULY back to the hospital for admission for fever and neutropenia as well as workup of presumed relapsed disease.  He was admitted on 2/27/2025.  Workup was remarkable for pancytopenia.  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  In addition to his diagnostic bone marrow and diagnostic lumbar puncture which was negative for disease (CNS 1) he also had a 7 Portuguese double-lumen Broviac placed on the same day.  He was started on high-dose prednisone on his admission.  After results confirmed relapsed disease, his case was discussed with [facility] and it was decided that he should receive a 3 drug reinduction.  As such, lumbar puncture with intrathecal chemotherapy was administered on 3/6/2025 and he was given his Day 1 vincristine.  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  On 3/22/2025, Tomas Roy reported significant perirectal pain, especially with stooling and also reported bloody and mucus filled stools at home.  For this reason in addition to not feeling well he was brought in for admission.  On admission he was noted to be neutropenic and also developed fever prompting his stay for fever and neutropenia.  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   Tomas Roy had his End of Reinduction evaluations on 4/4/2025.  Bone marrow at the time demonstrated a minuscule population of atypical lymphocytes initially thought to be consistent with residual disease however below the level of detection.  CAR-T workup had been performed and there was a plan to enroll on a dual CAR protocol at [facility].  Ultimately, Tomas Roy was discharged from the hospital on 4/9/2025 with plans to follow-up at [place] on 4/14/2025.  Upon his presentation to [place], there was concern however that his disease status was in complete remission and/or he had lost CD19 and CD22 expression.  For this reason, rather than being enrolled on study, repeat bone marrow was obtained at [place].  Repeat bone marrow did not demonstrate any residual disease consistent with complete remission.  BCR-ABL RT-PCR was also consistent with these findings.  Given complete remission, Tomas Roy was no longer considered a candidate for CAR-T and he was discharged back home to [place] to receive consolidating immune therapy prior to transplantation at [facility].  He presents today to begin his consolidating therapy with his first block of Blinatumomab.  Interval history is relatively unremarkable as Tomas Roy has been clinically well with recovery of his energy to near baseline.       On presentation, Tomas Roy is clinically well without any recent or remote illness.  He has remained afebrile without any signs or symptoms of acute illness to include cough, congestion or rhinorrhea.  No pulmonary complaints such as shortness of breath.  Energy and activity are near baseline at this time. Tomas Roy  reports that overall he is feeling quite well.  He denies any headaches, changes in vision or neurologic status changes.  There are no complaints of any nausea, vomiting, diarrhea or constipation.  No longer with any perirectal  discomfort or pain.  No complaints of any abdominal distention.   Tomas Roy denies any skin changes or rashes.  He does however report a very tender nodule in his right medial thigh which he just noted yesterday.  This is not associated with any overlying skin changes to include redness.  No overlying warmth.  No other swelling or nodules.  No fevers as above. Tomas Roy also has multiple scratch marks on his arms but denies any active scratches.  He reports that these are due to his cat.  The cat is apparently not feral.  No musculoskeletal aches or pains with the exception of bilateral posterior iliac bone pain which Tomas Roy reports began with his bilateral bone marrow procedures at [place].  He continues to take occasional oxycodone for the pain.  No other concerns or complaints at this time.     HOSPITAL COURSE:      Tomas Roy was admitted directly to the Cancer Nursing Unit at [facility] on 4/24/2025.  At the time of his admission he was clinically well and complained only of a tender nodule in his right medial thigh which he said had been present for a couple of days.  He also complained of some lower back pain bilaterally in his hips where procedures have been performed the week prior at [facility].  There were otherwise no concerns or complaints.  Prior to starting his blinatumomab therapy, 1 dose of dexamethasone 12 mg was given by mouth.  Following that, blinatumomab was initiated on 4/24/2025.  Just prior to the start of blinatumomab, nursing had noted that Tomas Roy had become flushed and tachycardic.  Shortly after this, he developed fever.  Given the warm and tender nodule in his medial thigh and significant pretreatment, blood cultures were obtained and empiric antibiotics were started.  Tomas Roy also complained that he had some cloudy thinking at the time.  He would otherwise tolerate the blinatumomab without any other significant concerns or  complaints.  On 4/26/2025, Tomas Roy had continued with fevers as high as 102 °F on Day 3 of hospitalization and blinatumomab block, Tomas Roy woke up with mild to moderate desaturation that responded to supplemental oxygen.  Additionally, he began to develop significantly soft blood pressures.  Due to the decrease in his blood pressures, he was administered a fluid bolus and achieved temporary improvement before later in the morning dropping his blood pressures again.  A second fluid bolus was administered in addition a dose of Solu-Cortef. Tomas Roy responded well to these interventions and maintained his blood pressure of the remainder of the day.  He also received a blood transfusion on 4/26/2025 for hemoglobin that had drifted down to the 6s.  He was continued on Solu-Cortef 3 times a day for the first day, weaning back to twice daily on 4/27/2025.  Whereas on the previous day Tomas Roy felt sick and without energy, following steroids he was reenergized and nearing his baseline.  Confusion and cloudy thinking had also disappeared.  On 4/28/2025, he was weaned further to a single dose of Solu-Cortef and did just fine without any additional concerns.  Ultimately on 429, steroids were completely weaned and Tomas Roy was transitioned to oral levofloxacin in anticipation of potential discharge the following day.  Overnight on Day 5 there were no issues and today, Tomas Roy awakened with good energy, activity and without any complaints of pain, nausea, vomiting, or diarrhea.  He complained only of constipation.    The patient was discharged home in stable condition on 4/30/2025.    HOME MEDICATIONS:    No current facility-administered medications on file prior to encounter.     Current Outpatient Medications on File Prior to Encounter   Medication Sig Dispense Refill    dasatinib (SPRYCEL) 70 MG tablet Take 1 Tablet by mouth 2 times a day. 60 Tablet 4    vitamin D  (CHOLECALCIFEROL) 1000 UNIT Tab Take 1 Tablet by mouth every day. 60 Tablet 2    LORazepam (ATIVAN) 1 MG Tab Take 1 Tablet by mouth at bedtime as needed (Nausea and Vomiting) for up to 180 days. 30 Tablet 5    senna-docusate (SENNA-S) 8.6-50 MG Tab Take 1 Tablet by mouth every day. 30 Tablet 0    ondansetron (ZOFRAN ODT) 4 MG TABLET DISPERSIBLE Take 2 Tablets by mouth every 6 hours as needed for Nausea/Vomiting. 20 Tablet 3    sulfamethoxazole-trimethoprim (BACTRIM DS) 800-160 MG tablet Take 1 Tablet by mouth 2 times a day. 16 Tablet 3    calcium carbonate (TUMS EX) 750 MG chewable tablet Chew 2 Tablets 3 times a day with meals. 30 Tablet 2    cyproheptadine (PERIACTIN) 4 MG Tab Take 1 Tablet by mouth 3 times a day for 30 days. 90 Tablet 0       DIET:  Regular diet, age appropriate.      ACTIVITY:  Regular activity tolerated.      MEDICAL CONDITION:  Stable.    DISCHARGE INSTRUCTIONS:  1) Patient/family instructed to call for fever > 100.4 °F  2) Patient/family instructed to call for concerns of worsening clinical condition  3) Patient/family instructed to call for intractable nausea and vomiting  4) Patient/family instructed to call for any bruising or bleeding  5) Patient/family instructed to call with any other concerns    Patient their family provided on call provider phone number [phone]    Disposition: Discharge to go pick for 7-day blinatumomab cassette.    Pepe Faye MD  Pediatric Hematology / Oncology  [facility]   Direct Ph. [phone] / Cell. [phone]  [email or web address]    Time Spent: 50 minutes spent in preparing discharge for patient

## 2025-05-02 ENCOUNTER — HOSPITAL ENCOUNTER (OUTPATIENT)
Dept: RADIOLOGY | Facility: MEDICAL CENTER | Age: 25
End: 2025-05-02
Attending: PEDIATRICS
Payer: COMMERCIAL

## 2025-05-02 DIAGNOSIS — Z76.82 BONE MARROW TRANSPLANT CANDIDATE: ICD-10-CM

## 2025-05-02 PROCEDURE — 70487 CT MAXILLOFACIAL W/DYE: CPT

## 2025-05-02 PROCEDURE — 700117 HCHG RX CONTRAST REV CODE 255: Performed by: PEDIATRICS

## 2025-05-02 PROCEDURE — 71260 CT THORAX DX C+: CPT

## 2025-05-02 RX ADMIN — IOHEXOL 75 ML: 350 INJECTION, SOLUTION INTRAVENOUS at 10:32

## 2025-05-02 RX ADMIN — IOHEXOL 75 ML: 350 INJECTION, SOLUTION INTRAVENOUS at 10:29

## 2025-05-04 ENCOUNTER — APPOINTMENT (OUTPATIENT)
Dept: ONCOLOGY | Facility: MEDICAL CENTER | Age: 25
End: 2025-05-04
Payer: COMMERCIAL

## 2025-05-05 ENCOUNTER — HOSPITAL ENCOUNTER (OUTPATIENT)
Dept: CARDIOLOGY | Facility: MEDICAL CENTER | Age: 25
End: 2025-05-05
Attending: PEDIATRICS
Payer: COMMERCIAL

## 2025-05-05 DIAGNOSIS — Z76.82 BONE MARROW TRANSPLANT CANDIDATE: ICD-10-CM

## 2025-05-05 LAB — LV EJECT FRACT  99904: 60

## 2025-05-05 PROCEDURE — 93356 MYOCRD STRAIN IMG SPCKL TRCK: CPT | Performed by: INTERNAL MEDICINE

## 2025-05-05 PROCEDURE — 93306 TTE W/DOPPLER COMPLETE: CPT | Mod: 26 | Performed by: INTERNAL MEDICINE

## 2025-05-05 PROCEDURE — 93356 MYOCRD STRAIN IMG SPCKL TRCK: CPT

## 2025-05-06 ENCOUNTER — OUTPATIENT INFUSION SERVICES (OUTPATIENT)
Dept: ONCOLOGY | Facility: MEDICAL CENTER | Age: 25
End: 2025-05-06
Attending: PEDIATRICS
Payer: COMMERCIAL

## 2025-05-06 VITALS
RESPIRATION RATE: 18 BRPM | HEIGHT: 65 IN | OXYGEN SATURATION: 96 % | DIASTOLIC BLOOD PRESSURE: 81 MMHG | HEART RATE: 120 BPM | WEIGHT: 153.22 LBS | BODY MASS INDEX: 25.53 KG/M2 | TEMPERATURE: 98.4 F | SYSTOLIC BLOOD PRESSURE: 121 MMHG

## 2025-05-06 DIAGNOSIS — C91.Z0 B LYMPHOBLASTIC LEUKEMIA WITH T(9;22)(Q34;Q11.2);BCR-ABL1 (HCC): ICD-10-CM

## 2025-05-06 PROCEDURE — 700111 HCHG RX REV CODE 636 W/ 250 OVERRIDE (IP): Mod: JZ,UD,TB | Performed by: PEDIATRICS

## 2025-05-06 PROCEDURE — G0498 CHEMO EXTEND IV INFUS W/PUMP: HCPCS

## 2025-05-06 PROCEDURE — 700101 HCHG RX REV CODE 250: Mod: UD | Performed by: PEDIATRICS

## 2025-05-06 PROCEDURE — 96521 REFILL/MAINT PORTABLE PUMP: CPT

## 2025-05-06 RX ADMIN — SODIUM CHLORIDE 196 MCG: 9 INJECTION INTRAMUSCULAR; INTRAVENOUS; SUBCUTANEOUS at 10:44

## 2025-05-06 ASSESSMENT — FIBROSIS 4 INDEX: FIB4 SCORE: 0.4

## 2025-05-06 NOTE — PROGRESS NOTES
"Pharmacy Chemotherapy Verification Note:    Dx: relapsed B-ALL (ph+)        Protocol: Blincyto + TKI Consolidation     Blinatumomab (Blincyto) 28 mcg IV continuous infusion over 24 hrs daily on Days 1-28  Dasatinib 140 mg PO daily on Days 1-42 (or ponatinib 15 mg PO daily on Days 1-42)   - on Dasatinib 70 mg BID since induction (POM)  42-day cycle until transplant, disease progression, or unacceptable toxicity  NCCN Guidelines for ALL. V.2.2024.  Meg DUMAS et al. NEJM. 2020;383(17):1613-23.  david Quinones al. Lancet Haematol. 2023;10(1):e24-e34.    Allergies:  Amoxicillin     /81   Pulse (!) 120   Temp 36.9 °C (98.4 °F) (Temporal)   Resp 18   Ht 1.65 m (5' 4.96\")   Wt 69.5 kg (153 lb 3.5 oz)   SpO2 96%   BMI 25.53 kg/m²  Body surface area is 1.78 meters squared.    All  labs  reviewed 4/30/25. No hold parameters.      Drug Order   (Drug name, dose, route, IV Fluid & volume, frequency, number of doses) Cycle: 1, Day 14-20 of 28      Previous treatment: s/p 3-Drug reinduction, D29 LP on 4/4/25     Medication = blinatumomab (Blincyto)  Base Dose = 28 mcg/day fixed dose  Calc Dose: Base Dose x 7d = 196 mcg  Final Dose = 196 mcg  Route = CIVI  Fluid & Volume = .8 mL (+ overfill)  Admin Duration = Over 168hrs via CADD pump @ 0.6 mL/hr Days 1-28  Bag size may change to accommodate up to 7 day infusion      <10% difference, okay to treat with final dose       By my signature below, I confirm this process was performed independently with the BSA and all final chemotherapy dosing calculations congruent. I have reviewed the above chemotherapy order and that my calculation of the final dose and BSA (when applicable) corroborate those calculations of the  pharmacist.     Galen Wilson, PharmD    "
Chemotherapy Verification - PRIMARY RN      Height = 165 cm  Weight = 69.5 kg  BSA = 1.78 m^2       Medication: blinatumomab  Dose: 28 mcg/day x 7 days  Calculated Dose: 196 mcg via CADD pump over 7 days                             (In mg/m2, AUC, mg/kg)     I confirm this process was performed independently with the BSA and all final chemotherapy dosing calculations congruent.  Any discrepancies of 10% or greater have been addressed with the chemotherapy pharmacist. The resolution of the discrepancy has been documented in the EPIC progress notes.       
Chemotherapy Verification - SECONDARY RN       Height = 1.65 m  Weight = 69.5 kg  BSA = 1.78 m^2       Medication: blinatumomab  Dose: 28 mcg/day for 7 days Calculated Dose: 196 mcg                             (In mg/m2, AUC, mg/kg)       I confirm that this process was performed independently.   
JULY is here for blinatumomab CADD pump exchange. Right dual lumen CVC in place. CADD pump stopped. 10 ml of blood wasted. Claves changed x2 JULY then connected to new blinatumomab CADD pump. CADD pump set to RUN mode. All connections open. Plan is to go to [place] for transplant today. Per JULY, blinatumomab CADD pump will be disconnected on Friday. JULY to follow up with MD for future plan of care. Discharged to self care; no apparent distress noted.    
89 F with Hx of CAD, s/p stent, Chronic Atrial Fibrillation on A/C with Eliquis, Chronic Diastolic CHF, Type 2 Diabetes Mellitus presents to the ED with the sudden onset of the epigastric pain in which started last evening.  The patient radiated to the lower abdominal quadrants and then radiated to the right upper quadrant.  Patient has a prior Hx of cholecystectomy.  No prior symptoms like this in the past.  No recent travel.  No associated nausea, vomiting, diarrhea.  No constipation.  No fevers or chills.  Pain worsened and she came to the ED.  In the ED, treated with IVFs.  Initial labs were essentially unremarkable except for a Lipase of 1492.  CT A/P was done which showed some mild inflammatory changes in the gallbladder fossa, no fluid collection and no radiographic evidence of acute pancreatitis.  There was a concern for possible right sided diverticulitis.  No recent trauma or injury to the right side.  She fell about 6 weeks ago and sustained a gluteal hematoma which is stable on CT.  She also has pleuritic pain on the same side and has difficulty laying flat due to the pain.  Pain also radiated to the right shoulder this morning.    5/28: Now asymptomatic and tolerating diet. Abdominal pain resolved. GI f/u noted. will d/c abx due to diarrhea. d/c janumet and start metformin at home.

## 2025-05-11 ENCOUNTER — APPOINTMENT (OUTPATIENT)
Dept: ONCOLOGY | Facility: MEDICAL CENTER | Age: 25
End: 2025-05-11
Payer: COMMERCIAL

## 2025-05-13 ENCOUNTER — APPOINTMENT (OUTPATIENT)
Dept: ONCOLOGY | Facility: MEDICAL CENTER | Age: 25
End: 2025-05-13
Attending: PEDIATRICS
Payer: COMMERCIAL

## 2025-05-14 LAB — COMPONENT P 8504P: NORMAL

## 2025-05-15 ENCOUNTER — TELEPHONE (OUTPATIENT)
Dept: OPHTHALMOLOGY | Facility: MEDICAL CENTER | Age: 25
End: 2025-05-15
Payer: COMMERCIAL

## 2025-05-15 DIAGNOSIS — B25.9 CYTOMEGALOVIRUS INFECTION, UNSPECIFIED CYTOMEGALOVIRAL INFECTION TYPE (HCC): Primary | ICD-10-CM

## 2025-05-15 DIAGNOSIS — B44.9 ASPERGILLUS (HCC): ICD-10-CM

## 2025-05-15 DIAGNOSIS — Z76.82 BONE MARROW TRANSPLANT CANDIDATE: ICD-10-CM

## 2025-05-15 NOTE — TELEPHONE ENCOUNTER
LVM requesting a call back to schedule patient for an appt on Monday Ok'd by Dr. Carranza, as we received an Urgent referral. Left my name and direct line.

## 2025-05-16 RX ORDER — ONDANSETRON 2 MG/ML
8 INJECTION INTRAMUSCULAR; INTRAVENOUS ONCE
Status: CANCELLED | OUTPATIENT
Start: 2025-05-19

## 2025-05-16 RX ORDER — ONDANSETRON 2 MG/ML
8 INJECTION INTRAMUSCULAR; INTRAVENOUS EVERY 8 HOURS PRN
Status: CANCELLED | OUTPATIENT
Start: 2025-05-19

## 2025-05-16 RX ORDER — LORAZEPAM 2 MG/ML
1 CONCENTRATE ORAL EVERY 6 HOURS PRN
Status: CANCELLED | OUTPATIENT
Start: 2025-05-19

## 2025-05-16 RX ORDER — LIDOCAINE AND PRILOCAINE 25; 25 MG/G; MG/G
CREAM TOPICAL PRN
Status: CANCELLED | OUTPATIENT
Start: 2025-05-19

## 2025-05-16 RX ORDER — PROMETHAZINE HYDROCHLORIDE 6.25 MG/5ML
0.25 SYRUP ORAL EVERY 6 HOURS PRN
Status: CANCELLED | OUTPATIENT
Start: 2025-05-19

## 2025-05-16 RX ORDER — DEXAMETHASONE 6 MG/1
12 TABLET ORAL ONCE
Status: CANCELLED | OUTPATIENT
Start: 2025-05-19 | End: 2025-05-30

## 2025-05-16 NOTE — Clinical Note
REFERRAL APPROVAL NOTICE         Sent on May 15, 2025                   JULY Jean-Baptiste  [address] [place] [address]                   Dear Mr. Jean-Baptiste,    After a careful review of the medical information and benefit coverage, [facility] has processed your referral. See below for additional details.    If applicable, you must be actively enrolled with your insurance for coverage of the authorized service. If you have any questions regarding your coverage, please contact your insurance directly.    REFERRAL INFORMATION   Referral #:  [id number]  Referred-To Department    Referred-By Provider:  Ophthalmology    Pepe Faye M.D.   [facility]      [address] [facility]  [address]  [place] [address]  [phone] [address] [place] [address], [address]  [place] [id number] [phone]    Referral Start Date:  05/15/2025  Referral End Date:   05/15/2026             SCHEDULING  If you do not already have an appointment, please call [phone] to make an appointment.     MORE INFORMATION  If you do not already have a [employer] account, sign up at: [email or web address]  You can access your medical information, make appointments, see lab results, billing information, and more.  If you have questions regarding this referral, please contact  the [facility] Referrals department at:             [phone]. Monday - Friday 8:00AM - 5:00PM.     Sincerely,    [facility]

## 2025-05-18 ENCOUNTER — APPOINTMENT (OUTPATIENT)
Dept: ONCOLOGY | Facility: MEDICAL CENTER | Age: 25
End: 2025-05-18
Payer: COMMERCIAL

## 2025-05-19 ENCOUNTER — HOSPITAL ENCOUNTER (INPATIENT)
Facility: MEDICAL CENTER | Age: 25
LOS: 4 days | DRG: 838 | End: 2025-05-23
Attending: PEDIATRICS | Admitting: PEDIATRICS
Payer: COMMERCIAL

## 2025-05-19 ENCOUNTER — OFFICE VISIT (OUTPATIENT)
Dept: OPHTHALMOLOGY | Facility: MEDICAL CENTER | Age: 25
End: 2025-05-19
Payer: COMMERCIAL

## 2025-05-19 DIAGNOSIS — C91.00 PHILADELPHIA CHROMOSOME POSITIVE ACUTE LYMPHOBLASTIC LEUKEMIA (HCC): ICD-10-CM

## 2025-05-19 DIAGNOSIS — B44.9 ASPERGILLUS (HCC): ICD-10-CM

## 2025-05-19 DIAGNOSIS — Z91.89 AT RISK FOR OPPORTUNISTIC INFECTIONS: ICD-10-CM

## 2025-05-19 DIAGNOSIS — H49.22 LEFT ABDUCENS NERVE PALSY: ICD-10-CM

## 2025-05-19 DIAGNOSIS — H52.13 MYOPIA OF BOTH EYES: ICD-10-CM

## 2025-05-19 DIAGNOSIS — C91.Z0 B LYMPHOBLASTIC LEUKEMIA WITH T(9;22)(Q34;Q11.2);BCR-ABL1 (HCC): ICD-10-CM

## 2025-05-19 DIAGNOSIS — C91.01 ACUTE LYMPHOID LEUKEMIA IN REMISSION (HCC): Primary | ICD-10-CM

## 2025-05-19 DIAGNOSIS — H40.003 GLAUCOMA SUSPECT OF BOTH EYES: Primary | ICD-10-CM

## 2025-05-19 PROCEDURE — 700111 HCHG RX REV CODE 636 W/ 250 OVERRIDE (IP): Performed by: PEDIATRICS

## 2025-05-19 PROCEDURE — 99214 OFFICE O/P EST MOD 30 MIN: CPT | Mod: 25 | Performed by: OPHTHALMOLOGY

## 2025-05-19 PROCEDURE — A9270 NON-COVERED ITEM OR SERVICE: HCPCS | Performed by: PEDIATRICS

## 2025-05-19 PROCEDURE — 700102 HCHG RX REV CODE 250 W/ 637 OVERRIDE(OP): Performed by: PEDIATRICS

## 2025-05-19 PROCEDURE — 80285 DRUG ASSAY VORICONAZOLE: CPT

## 2025-05-19 PROCEDURE — 99223 1ST HOSP IP/OBS HIGH 75: CPT | Performed by: PEDIATRICS

## 2025-05-19 PROCEDURE — 92250 FUNDUS PHOTOGRAPHY W/I&R: CPT | Performed by: OPHTHALMOLOGY

## 2025-05-19 PROCEDURE — 770004 HCHG ROOM/CARE - ONCOLOGY PRIVATE *

## 2025-05-19 RX ORDER — VORICONAZOLE 200 MG/1
300 TABLET, FILM COATED ORAL EVERY 12 HOURS
Status: DISCONTINUED | OUTPATIENT
Start: 2025-05-19 | End: 2025-05-23 | Stop reason: HOSPADM

## 2025-05-19 RX ORDER — LEVOFLOXACIN 500 MG/1
500 TABLET, FILM COATED ORAL EVERY 24 HOURS
Status: DISCONTINUED | OUTPATIENT
Start: 2025-05-20 | End: 2025-05-23 | Stop reason: HOSPADM

## 2025-05-19 RX ORDER — FAMOTIDINE 20 MG/1
20 TABLET, FILM COATED ORAL 2 TIMES DAILY
COMMUNITY

## 2025-05-19 RX ORDER — CALCIUM CARBONATE 500 MG/1
1500 TABLET, CHEWABLE ORAL
Status: DISCONTINUED | OUTPATIENT
Start: 2025-05-19 | End: 2025-05-23 | Stop reason: HOSPADM

## 2025-05-19 RX ORDER — VALGANCICLOVIR 450 MG/1
450 TABLET, FILM COATED ORAL DAILY
Status: DISCONTINUED | OUTPATIENT
Start: 2025-05-20 | End: 2025-05-23 | Stop reason: HOSPADM

## 2025-05-19 RX ORDER — VORICONAZOLE 10 MG/ML
INJECTION, POWDER, LYOPHILIZED, FOR SOLUTION INTRAVENOUS
COMMUNITY

## 2025-05-19 RX ORDER — VALGANCICLOVIR 450 MG/1
450 TABLET, FILM COATED ORAL 2 TIMES DAILY
Status: CANCELLED | OUTPATIENT
Start: 2025-05-20 | End: 2025-05-24

## 2025-05-19 RX ORDER — SULFAMETHOXAZOLE AND TRIMETHOPRIM 800; 160 MG/1; MG/1
1 TABLET ORAL
Status: DISCONTINUED | OUTPATIENT
Start: 2025-05-24 | End: 2025-05-23 | Stop reason: HOSPADM

## 2025-05-19 RX ORDER — DASATINIB 70 MG/1
70 TABLET, FILM COATED ORAL 2 TIMES DAILY
Status: DISCONTINUED | OUTPATIENT
Start: 2025-05-19 | End: 2025-05-23 | Stop reason: HOSPADM

## 2025-05-19 RX ORDER — AMOXICILLIN 250 MG
1 CAPSULE ORAL DAILY
Status: DISCONTINUED | OUTPATIENT
Start: 2025-05-19 | End: 2025-05-23 | Stop reason: HOSPADM

## 2025-05-19 RX ORDER — VALGANCICLOVIR 450 MG/1
450 TABLET, FILM COATED ORAL DAILY
COMMUNITY

## 2025-05-19 RX ADMIN — DASATINIB 70 MG: 70 TABLET ORAL at 18:24

## 2025-05-19 RX ADMIN — VORICONAZOLE 300 MG: 200 TABLET ORAL at 21:48

## 2025-05-19 RX ADMIN — ANTACID TABLETS 1500 MG: 500 TABLET, CHEWABLE ORAL at 18:23

## 2025-05-19 ASSESSMENT — SOCIAL DETERMINANTS OF HEALTH (SDOH)
WITHIN THE LAST YEAR, HAVE YOU BEEN KICKED, HIT, SLAPPED, OR OTHERWISE PHYSICALLY HURT BY YOUR PARTNER OR EX-PARTNER?: NO
IN THE PAST 12 MONTHS, HAS THE ELECTRIC, GAS, OIL, OR WATER COMPANY THREATENED TO SHUT OFF SERVICE IN YOUR HOME?: NO
WITHIN THE PAST 12 MONTHS, YOU WORRIED THAT YOUR FOOD WOULD RUN OUT BEFORE YOU GOT THE MONEY TO BUY MORE: NEVER TRUE
WITHIN THE PAST 12 MONTHS, THE FOOD YOU BOUGHT JUST DIDN'T LAST AND YOU DIDN'T HAVE MONEY TO GET MORE: NEVER TRUE
WITHIN THE LAST YEAR, HAVE YOU BEEN AFRAID OF YOUR PARTNER OR EX-PARTNER?: NO
WITHIN THE LAST YEAR, HAVE YOU BEEN HUMILIATED OR EMOTIONALLY ABUSED IN OTHER WAYS BY YOUR PARTNER OR EX-PARTNER?: NO
WITHIN THE LAST YEAR, HAVE TO BEEN RAPED OR FORCED TO HAVE ANY KIND OF SEXUAL ACTIVITY BY YOUR PARTNER OR EX-PARTNER?: NO

## 2025-05-19 ASSESSMENT — PATIENT HEALTH QUESTIONNAIRE - PHQ9
7. TROUBLE CONCENTRATING ON THINGS, SUCH AS READING THE NEWSPAPER OR WATCHING TELEVISION: NOT AT ALL
1. LITTLE INTEREST OR PLEASURE IN DOING THINGS: NOT AT ALL
SUM OF ALL RESPONSES TO PHQ QUESTIONS 1-9: 0
6. FEELING BAD ABOUT YOURSELF - OR THAT YOU ARE A FAILURE OR HAVE LET YOURSELF OR YOUR FAMILY DOWN: NOT AL ALL
SUM OF ALL RESPONSES TO PHQ9 QUESTIONS 1 AND 2: 0
2. FEELING DOWN, DEPRESSED, IRRITABLE, OR HOPELESS: NOT AT ALL
3. TROUBLE FALLING OR STAYING ASLEEP OR SLEEPING TOO MUCH: NOT AT ALL
9. THOUGHTS THAT YOU WOULD BE BETTER OFF DEAD, OR OF HURTING YOURSELF: NOT AT ALL
5. POOR APPETITE OR OVEREATING: NOT AT ALL
4. FEELING TIRED OR HAVING LITTLE ENERGY: NOT AT ALL
8. MOVING OR SPEAKING SO SLOWLY THAT OTHER PEOPLE COULD HAVE NOTICED. OR THE OPPOSITE, BEING SO FIGETY OR RESTLESS THAT YOU HAVE BEEN MOVING AROUND A LOT MORE THAN USUAL: NOT AT ALL

## 2025-05-19 ASSESSMENT — COGNITIVE AND FUNCTIONAL STATUS - GENERAL
SUGGESTED CMS G CODE MODIFIER DAILY ACTIVITY: CH
MOBILITY SCORE: 24
DAILY ACTIVITIY SCORE: 24
SUGGESTED CMS G CODE MODIFIER MOBILITY: CH

## 2025-05-19 ASSESSMENT — CUP TO DISC RATIO
OD_RATIO: 0.3
OS_RATIO: 0.3

## 2025-05-19 ASSESSMENT — LIFESTYLE VARIABLES
EVER HAD A DRINK FIRST THING IN THE MORNING TO STEADY YOUR NERVES TO GET RID OF A HANGOVER: NO
HAVE YOU EVER FELT YOU SHOULD CUT DOWN ON YOUR DRINKING: NO
HOW MANY TIMES IN THE PAST YEAR HAVE YOU HAD 5 OR MORE DRINKS IN A DAY: 0
CONSUMPTION TOTAL: NEGATIVE
ALCOHOL_USE: NO
EVER FELT BAD OR GUILTY ABOUT YOUR DRINKING: NO
HAVE PEOPLE ANNOYED YOU BY CRITICIZING YOUR DRINKING: NO
TOTAL SCORE: 0
AVERAGE NUMBER OF DAYS PER WEEK YOU HAVE A DRINK CONTAINING ALCOHOL: 0
TOTAL SCORE: 0
ON A TYPICAL DAY WHEN YOU DRINK ALCOHOL HOW MANY DRINKS DO YOU HAVE: 0
TOTAL SCORE: 0

## 2025-05-19 ASSESSMENT — VISUAL ACUITY
OS_CC: 20/25
OS_CC: J1+
METHOD: SNELLEN - LINEAR
CORRECTION_TYPE: GLASSES
OD_CC: J1+
OD_CC: 20/25

## 2025-05-19 ASSESSMENT — REFRACTION_MANIFEST
OS_SPHERE: -7.25
OS_AXIS: 082
OS_CYLINDER: +2.50
OD_AXIS: 097
OD_CYLINDER: +2.50
METHOD_AUTOREFRACTION: 1
OD_SPHERE: -7.25

## 2025-05-19 ASSESSMENT — TONOMETRY
OD_IOP_MMHG: 23
OS_IOP_MMHG: 20
OS_IOP_MMHG: 16
OD_IOP_MMHG: 16

## 2025-05-19 ASSESSMENT — PAIN DESCRIPTION - PAIN TYPE
TYPE: ACUTE PAIN
TYPE: ACUTE PAIN

## 2025-05-19 ASSESSMENT — REFRACTION_WEARINGRX
OD_CYLINDER: +3.00
OS_SPHERE: -8.00
OS_CYLINDER: +2.50
OD_AXIS: 091
OS_AXIS: 093
SPECS_TYPE: SVL
OD_SPHERE: -8.00

## 2025-05-19 ASSESSMENT — FIBROSIS 4 INDEX
FIB4 SCORE: 0.4
FIB4 SCORE: 0.4

## 2025-05-19 ASSESSMENT — SLIT LAMP EXAM - LIDS
COMMENTS: NORMAL
COMMENTS: NORMAL

## 2025-05-19 ASSESSMENT — EXTERNAL EXAM - RIGHT EYE: OD_EXAM: NORMAL

## 2025-05-19 ASSESSMENT — EXTERNAL EXAM - LEFT EYE: OS_EXAM: NORMAL

## 2025-05-19 NOTE — H&P
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 demonstrated a WBC of 0.4, Hgb 8.8 and platelets 125.  ANC at the time was measured at only 170.  JULY was otherwise clinically well appearing.  He did receive a one-time normal saline bolus for his nausea and vomiting and was sent home with instructions to HOLD his oral mercaptopurine and oral methotrexate which began being held on 7/28/2023.  Per protocol, imatinib was continued at previous dose of 600 mg in the morning. Tomas Roy would return to clinic again on 8/2/2023 and 8/7/2023.  On both occasions, ANC remained under 500 and oral therapy (with the exception of imatinib) continue to be held while awaiting count recovery.  Ultimately, on 8/11/2023 after 14 days of therapy being held, Tomas Roy returned to clinic and WBC had increased to 2.1 with ANC increasing to 1500 with an absolute monocyte count of 290. Tomas Roy was then instructed to resume his oral chemotherapy at 100% of  prior dosing with (due to timing with Day 1 of Cycle 2 with LP 3 days later, no weekly MTX was administered).  Imatinib was never held.  On 8/14/2023 he was seen in clinic for Day 1 of Cycle 2 of Maintenance with lumbar puncture, vincristine, and 5-day pulse of steroids.  At the time, his ANC was 2010 and his oral chemotherapy was continued at 100% dosing.  Given his history of previously drop in counts, he was scheduled to come back for repeat labs on 8/29/2023 at which point his WBC count was 1.4 and his ANC remained greater than 500 at 1140.  Again, his therapy was continued with imatinib 550 mg by mouth in the morning as well as 100% dosing of methotrexate and 100% dosing of 6-mercaptopurine.  When Tomas Roy returned to clinic on 9/11/2023 for his Maintenance, Cycle 2, Day 29 therapy he was noted to have had another drop in his WBC to 600 and his ANC accordingly was also decreased at 410.  He did receive vincristine in accordance with protocol as well as starting a 5-day pulse of prednisone per protocol.  Given however that his ANC had dropped below 500 his oral methotrexate and oral 6-MP were again held.  He was instructed return to clinic 1 week later for lab evaluations.  On 9/19/2023 he returned to clinic and WBC had improved slightly to 0.8 however ANC remained low at 260 with an absolute monocyte count of 220.  Given that counts not recovered his oral chemotherapy remained held for an additional week.  On 9/26/2023 at 14 days after initially holding chemotherapy, he was seen back in clinic at which time WBC improved again to 1.1 however ANC did not quite recover and remained at 490 with an absolute monocyte count of 330.  Given that 2 weeks had elapsed with myelosuppression, imatinib was held in addition to oral 6-MP and methotrexate. Tomas Roy was instructed to return to clinic on 9/29/2023 for repeat counts.  On 9/29/2023 WBC had improved to 2.4 and ANC had finally recovered with 1530 and  an absolute monocyte count of 510.  Given a recovery with ANC greater than 750 and platelets greater than 75, oral chemotherapy was restarted at 50% of initial dosing and imatinib was restarted at 100% of initial dosing.  On 10/9/2023, Tomas Roy returned to clinic for his Day 57 of Cycle 2 visit.  At the time of his visit, his ANC had recovered after holding chemotherapy and then restarting at 50% dosing 11 days prior.  He did however in clinic spiked a temperature 102.5 °F.  For this reason despite his ANC being improved, no dose adjustments were made in his chemotherapy.  He continued with 50% dosing with 100% adherence of his 6-MP and methotrexate as well as his imatinib at 550 mg PO QHS.   Tomas Ryo was then seen in clinic again on 11/6/2023 for his Day 1 of Cycle 3 of Maintenance therapy.  At the time, his imatinib dose was adjusted back up to 600 mg daily taken in the morning.  Additionally, his methotrexate was adjusted back up to 75% of expected dosing and his mercaptopurine was increased to 75% of expected dosing as well.  He will return to clinic on 12/4/2023 for his Day 29 of Cycle 3 therapy.  At the time, his ANC was exactly 1500 and therefore no dose adjustments were made and he continued at 75% dosing for oral chemotherapy as well as the imatinib dose of 600 mg daily.  On 1/2/2024 he was seen for his Day 57 evaluations and his ANC had dropped to 1450 again not prompting any change in oral chemotherapy dosing.  Tomas Roy completed his Cycle 4 chemotherapy without any adverse events or complications.  He did not have any changes in medications either.  Most recently,  Tomas Roy was seen in clinic on 4/22/2023 for his Day 1 of Cycle 5 chemotherapy.  At the time, a lumbar puncture was performed and IT chemotherapy was provided.  He tolerated the procedure and the therapy well without any sequelae.  His ANC at the time was > 1500 however the dose adjustment was made as this was  "the first ANC greater than 1500 and Tomas Roy had a history of precipitous drops in his counts with increases in his oral chemotherapy.  On 5/20/2024, Tomas Roy presented to clinic for his Cycle 5, Day 29 therapy and evaluations. At that time, he was started on 5 day pulse of prednisone and his oral methotrexate dose was increased to 13 tablets PO weekly (90.78% of expected dosing). His port was removed on 5/20/2024 by Dr. Moise. He completed therapy on 5/30/2024.  Since his completion of therapy and poor removal, he has been seen in follow-up and was most recently seen on 1/15/2025 at which point there was no evidence of relapse or recurrence both clinically or in his labs.  Interval history however is remarkable for upper respiratory symptoms approximately 1-1/2 weeks ago.  At the time, he reported having some nausea and vomiting as well as an upset stomach and sore throat.  The symptoms were thought most likely to be viral and in fact improved consistent with viral illness.  On about 2/24/2025 however symptoms returned and were more flulike in nature with aches and pains and low-grade temperatures.  On the evening of 2/26/2025, JULY called Pediatric Hematology/Oncology to report that he \"feels like I am relapsing\". Tomas Roy was brought in to clinic today for evaluations.  In clinic, CBC, CMP, respiratory viral panel and EBV studies were obtained.  Respiratory viral studies were negative.  CBC however demonstrated a white blood cell count of 1, hemoglobin 10.6, platelets of 53 without any circulating blasts.  His CMP for the most part was normal with mild hyponatremia.  Uric acid was 7.2 and an LDH of 244.  Given these lab values as well as a temperature of 100.6 while in clinic also in the context of an acute infection of his right fourth phalanx, decision was made to bring JULY back to the hospital for admission for fever and neutropenia as well as workup of presumed relapsed disease.  He was " admitted on 2/27/2025.  Workup was remarkable for pancytopenia.  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  In addition to his diagnostic bone marrow and diagnostic lumbar puncture which was negative for disease (CNS 1) he also had a 7 [place] double-lumen Broviac placed on the same day.  He was started on high-dose prednisone on his admission.  After results confirmed relapsed disease, his case was discussed with [facility] and it was decided that he should receive a 3 drug reinduction.  As such, lumbar puncture with intrathecal chemotherapy was administered on 3/6/2025 and he was given his Day 1 vincristine.  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  On 3/22/2025, Tomas Roy reported significant perirectal pain, especially with stooling and also reported bloody and mucus filled stools at home.  For this reason in addition to not feeling well he was brought in for admission.  On admission he was noted to be neutropenic and also developed fever prompting his stay for fever and neutropenia.  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   Tomas Roy had his End of Reinduction evaluations on 4/4/2025.  Bone marrow at the time demonstrated a minuscule population of atypical lymphocytes initially thought to be consistent with residual disease however below the level of detection.  CAR-T workup had been performed and there was a plan to enroll on a dual CAR protocol at [facility].  Ultimately, Tomas Roy was discharged from the hospital on 4/9/2025 with plans to follow-up at [place] on 4/14/2025.  Upon his presentation to [place], there was concern however that his disease status was in complete remission  and/or he had lost CD19 and CD22 expression.  For this reason, rather than being enrolled on study, repeat bone marrow was obtained at [place].  Repeat bone marrow did not demonstrate any residual disease consistent with complete remission.  BCR-ABL RT-PCR was also consistent with these findings.  Given complete remission, Tomas Roy was no longer considered a candidate for CAR-T and he was discharged back home to [place] to receive consolidating immune therapy prior to transplantation at [facility].  On 4/24/2025 he was admitted for Blinatumomab Block 1 therapy.  His hospitalization was complicated by fever, clinical sepsis without identification as well as body aches and pains.  His clinical sepsis was thought to be due to adrenal insufficiency and he was started on Solu-Cortef and ultimately placed on wean before his discharge on 4/30/2025..  He was ultimately discharged on 4/30/2025.  Interval history was remarkable for completion of his transplant workup and travel to [place] for proposed transplant.  At [place], follow-up of abnormal findings on CT of the chest with a BAL demonstrated both CMV as well as Aspergillus.  For this reason, he was started on valganciclovir as well as voriconazole.  Given these new findings, his transplant was placed on hold temporarily.  He was collected however for his Tallo10 study before being sent back to [place] for another month of blinatumomab and Dasatinib therapy.  Today, he presents for admission for another bridging block of Blinatumomab Block 2.      On presentation, Tomas Roy reports that clinically he is exceptionally well.  He has not had any recent or remote illness.  Did call about a presumptive fever the other night in the context of feeling exceptionally well.  Upon reevaluation it was noted that his thermometer was broken.  No complaints of any headaches, changes in vision or neurologic status changes. Tomas Roy was seen in clinic with  Dr. Carranza this morning and per report in the medical record, no retinal evidence of CMV or Aspergillus.  There are no complaints of any shortness of breath, cough or difficulty breathing.  No nausea, vomiting, diarrhea or constipation.  No bleeding per rectum.  No complaints of any skin changes or rashes.  No aches or pains.  Per Tomas Roy, taking voriconazole, valganciclovir, Dasatinib and weekend Bactrim with 100% adherence.  No other concerns or complaints at this time.    Review of Systems:     Constitutional: Afebrile.  No recent or remote illness.  Energy and activity are at baseline.  HENT: Negative for auditory changes, nosebleeds and sore throat.  No mouth sores.  Eyes: Negative for visual changes.  Respiratory: Negative for  shortness of breath and stridor.   Cardiovascular: Negative for chest pain and leg swelling.    Gastrointestinal: Negative for nausea, vomiting, abdominal pain, diarrhea, constipation and blood in stool.    Genitourinary: Negative for dysuria and flank pain.    Musculoskeletal: Positive for chronic filgrastim induced bone pain.    Skin: Negative for rash, signs of infection.  Neurological: Negative for numbness, tingling, sensory changes, weakness or headaches.    Endo/Heme/Allergies: Does not bruise/bleed easily.    Psychiatric/Behavioral: No changes in mood, appropriate for age.     PAST MEDICAL HISTORY:     Oncologic History:  2-3 week history of worsening fatigue, right lower extremity pain  Presentation to OS and diagnosed with right LE superficial thrombus, subsegmental PE and hyperleukocytosis, anemia and thrombocytopenia  Transferred to [facility] for definitive care  Presenting (local) WBC > 440K, Hgb 10.0, platelets 53, (automated differential ANC 3190, ALC 75,310, absolute monocyte count 95229, absolute blast count 340,560)  Uric Acid 15.6, phosphorus 5.6, LDH 1114  Rasburicase x 1 dose given   Peripheral Blood flow cytometry demonstrating CD10 pos, CD19 pos, CD20  neg, CD22 dim (60%) 5/28/2022  Peripheral blood FISH for BCR-ABL1 positive in 98% of analyzed cells     Age at Diagnosis: 21 years  White Blood Cell Count at Presentation: > 440 k/uL  Testicular Disease Status: Negative (see procedure note 5/30/2022)  CNS Status: 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, diagnostic LP with WBC 1, RBC 3 and no evidence of leukemic blasts 5/30/2022  Steroid Pre-treatment: None  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     All inclusion/exclusion criteria for [id number] met and consent signed - enrolled 5/29/2022   All inclusion/exclusion criteria for [id number] met and consent signed - enrolled 5/30/2022  Confirmatory bone marrow aspirate and biopsy and diagnostic LP + cytarabine 70 mg IT 5/30/2022  Induction therapy (ON STUDY [id number]) started 5/30/2022  Bone marrow immunohistochemistry consistent with diagnosis of B-ALL comprising 90% of marrow cellularity  Bone marrow sample sent to [facility] for COG purposes:  Flow cytom  Of the blood pressure little high that is a problem is a cultural problem is well and cultural genetic and everything else like that unfortunately breathalyzers such bad heart disease diabetes things like that  populations etry consistent with peripheral blood, cytogenetics remarkable for known t(9;22)  CSF with WBC 1, RBC 3, no blasts identified on cytospin  FISH results available 5/31/2022 making patient eligible for transfer from [place] [id number] to [place] [address] as eligibility requirements were met for [place] [address]  Patient unenrolled from [place] [address] (BCR-ABL1 fusion positive) 6/1/2022  Consent signed for [place] [address] and patient enrolled 6/1/2022 (see eligibility checklist from 5/31/2022 and consent note from 6/1/2022)  Imatinib 400 mg PO QAM / 200 mg PO QPM started 6/3/2022 (allowed per [place] [address])  Patient completed the first 15 days of a Standard 4-drug Induction on 6/13/2022  Start of COG  "[id number](OS), Induction IA Part 2, Day 15 6/13/2022  End of Induction 1A Part 2 - MRD negative  Start of [facility] [id number]), Induction IA Part 2, Day 15 7/5/2022  Induction IB DELAYED 2 weeks 14 days from 7/26/2022-8/9/2022) for myelosuppression - Start of Day 22 cytarabine block 8/9/2022  Induction IB Day 42 delayed 9 days for myelosuppression - Day 42 evaluations 9/7/2022  End of Induction IB - Flow cytometric MRD negative, MRD by IgH-TCR PCR 00.9137808%  Randomization to AR-Experimental Arm B of [id number]  Start of AR-Experimental Arm B, Interim Maintenance 9/29/2022  Weight based increase in dose of imatinib to 400 mg PO AM and 250 mg PM 9/29/2022  Thrombocytopenia not permissive of proceeding with Day 15 of Interim Maintenance  AR-Experimental Arm B, Interim Maintenance, Day 15 delayed 4 days, start 10/17/2022  AR-Experimental Arm B, Interim Maintenance, Day 29, start 11/1/2022  AR-Experimental Arm B, Interim Maintenance, Day 43, start 11/14/2022  Last does of 6-MP 11/27/2022  AR-Experimental Arm B, Delayed Intensification, Day 1, start 12/6/2022  Admission with Severe Septic Shock 12/27/2022  Imatinib HELD 12/27/2022-1/8/2023  AR-Experimental Arm B, Delayed Intensification, Day 29 DELAYED 14 days with start 1/17/2023  Hospitalization 2/7/2023 on Day 50 of Delayed Intensification with left shoulder pain ultimately diagnosed with Bacteroides fragilis infection  AR-Experimental Arm B, Interim Maintenance, Day 1 DELAYED 7 days with start 2/27/2023  AR-Experimental Arm B, Interim Maintenance, Day 11 DELAYED 4 days for platelets 43K on 3/9/2023  AR-Experimental Arm B, Interim Maintenance, Day 11 VCR given and MTX omitted for platelets 43K 3/13/2023  Imatinib HELD 3/30/2023-4/11/2023  AR-Experimental Arm B, Interim Maintenance, Day 21 (DELAYED 22 DAYS) - administered 4/11/2023 with methotrexate 80 mg/m² IV  AR-Experimental Arm B, Interim Maintenance, Day 31 (\"true\" Day 31 4/25/2023) - VCR and IT MTX given 4/28/2023 - IV " MTX omitted  AR-Experimental Arm B, Interim Maintenance, Day 41 met with counts - administered 5/5/2023 with methotrexate 80 mg/m² IV     Start of Maintenance, Cycle 1, Day 1 -5/22/2023  Cranial radiation 6/5/2023-6/16/2023 (10 fractions)  Maintenance, Cycle 1, Day 29 - 6/23/2023 (no IT MTX given)  Maintenance, Cycle 1, Day 67, presented with fatigue nausea and vomiting found to have ANC less than 500  Methotrexate (oral) and mercaptopurine held 7/27/2023 - continued with imatinib  Methotrexate (oral) and mercaptopurine held 8/2/2023 - continued with imatinib  Methotrexate (oral) and mercaptopurine held 8/7/2023 - continued with imatinib  Restarted methotrexate (oral) and mercaptopurine at 100% previous dosing on 8/11/2023 - continued with imatinib  Maintenance, Cycle 2, Day 1 - 8/14/2023  Maintenance, Cycle 2, Day 29 - 9/11/2023  Methotrexate (oral) and mercaptopurine held 9/11/2023 - continued with imatinib  Methotrexate (oral) and mercaptopurine held 9/19/2023 - continued with imatinib  Methotrexate (oral) and mercaptopurine held 9/26/2023 - HELD imatinib  Methotrexate (oral) restarted at 50% dosing and mercaptopurine restarted at 50% dosing 9/29/2023 - RESTARTED imatinib  Maintenance, Cycle 2, Day 57 - 10/9/2023  Maintenance, Cycle 3, Day 1 - 11/6/2023: Methotrexate (oral) increased to 75% dosing and mercaptopurine increased to 75% dosing.  Imatinib increased to 600 mg QAM 11/6/2023  Maintenance Cycle 3, Day 29: 12/4/2023 No changes made to the dosing of oral chemotherapy  Maintenance, Cycle 4, Day 1: No dose adjustments made however ANC slightly greater than >1500.  Oral chemotherapy did not need weight adjustment.  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  confering resistance to imatinib     Imatinib discontinued, dasatanib started 3/22/2025    End of Re-Induction 4/4/2025 demonstrating suspicious population of phenotypically atypical cells at 0.005%  BCR-ABL RT-PCR 4/4/2025 detectable at 0.269421%    Repeat bone marrow evaluation at [facility] 4/15/2025 MRD negative  BCR-ABL RT-PCR 4/15/2025 undetectable    Blinatumomab Block 1 4/24/2025  Tallo10 collection last week  Blinatumomab Block 2 5/19/2025     Past Medical History:    1) Previously Healthy  2) 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  3) Hyperleukocytosis  4) Hyperuricemia  5) Hyperphosphatemia  6) Right Lower Extremity Superficial Thrombus  7) Subsegmental Pulmonary Embolism  8) 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  9) Bacteroides fragilis soft tissue infection left shoulder  10) Anxiety/Depression  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  Both maternal and paternal family history of diabetes.     Immunizations:  UTD     Social History:   Lives with girlfriend.  Graduated from college.  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  Social situation with family is fractured.    Medications:   No current facility-administered medications on file prior to encounter.     Current Outpatient Medications on File Prior to Encounter   Medication Sig Dispense Refill    levoFLOXacin (LEVAQUIN) 500 MG tablet Take 1 Tablet by mouth every 24 hours. 30 Tablet 0    dasatinib (SPRYCEL) 70 MG tablet Take 1 Tablet by mouth 2 times a day. 60 Tablet 4    sulfamethoxazole-trimethoprim (BACTRIM DS) 800-160 MG tablet  Take 1 Tablet by mouth 2 times a day. 16 Tablet 3    calcium carbonate (TUMS EX) 750 MG chewable tablet Chew 2 Tablets 3 times a day with meals. 30 Tablet 2    vitamin D (CHOLECALCIFEROL) 1000 UNIT Tab Take 1 Tablet by mouth every day. (Patient not taking: Reported on 5/19/2025) 60 Tablet 2    LORazepam (ATIVAN) 1 MG Tab Take 1 Tablet by mouth at bedtime as needed (Nausea and Vomiting) for up to 180 days. 30 Tablet 5    senna-docusate (SENNA-S) 8.6-50 MG Tab Take 1 Tablet by mouth every day. 30 Tablet 0    ondansetron (ZOFRAN ODT) 4 MG TABLET DISPERSIBLE Take 2 Tablets by mouth every 6 hours as needed for Nausea/Vomiting. 20 Tablet 3     OBJECTIVE:     Vitals:   Wt 66.8 kg (147 lb 4.3 oz)   BMI 24.54 kg/m²     Labs:    CBC, CMP on admission    Physical Exam:    Constitutional: Well-developed, well-nourished, and in no distress.  Very well-appearing.  HENT: Normocephalic and atraumatic. No nasal congestion or rhinorrhea. Oropharynx is clear and moist. No oral ulcerations or sores.    Eyes: Conjunctivae are normal. Pupils are equal, round.  EOMI.  Nonicteric.  Glasses.  Neck: Normal range of motion of neck, no adenopathy.    Cardiovascular: Normal rate, regular rhythm and normal heart sounds.  No murmur heard. DP/radial pulses 2+, cap refill < 2 sec.  Pulmonary/Chest: Effort normal and breath sounds normal. No respiratory distress. Symmetric expansion.  No crackles or wheezes.  Abdomen: Soft. Bowel sounds are normal. No distension and no mass. There is no hepatosplenomegaly.    Genitourinary:  Deferred.  Musculoskeletal: Normal range of motion of lower and upper extremities bilaterally. No tenderness to palpation of elbows, wrists, hands, knees, ankles and feet bilaterally.   No longer tender, 1 cm firm palpable nodule in the right medial thigh..  Lymphadenopathy: No cervical adenopathy.  Neurological: Alert and oriented to person and place. Exhibits normal muscle tone bilaterally in upper and lower extremities. Gait  normal. Coordination normal.    Skin: Skin is warm, dry and pink.  No rash or evidence of skin infection.  No pallor.   Psychiatric: Mood and affect normal for age.    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 Likely plan for HSCT, search for MUD ongoing. After discussing multiple options, they decided on a 3-Drug Re-Induction (VCR/PRED/PEG-ASP) +Imatinib followed by blinatumomab. 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 Met virtually with Dr. Adrian, [place] [facility] 3/20/2025     - Has had discussions with both bone marrow transplant as well as some therapeutics (CAR-T) at [facility].   Complete remission after re-induction, making CAR-T ineligible  - End of Reinduction evaluations demonstrating phenotypically similar aberrant lymphoblast population of 0.005% (however lacking CD19 or CD22)   - Repeat BM at [place] confirming remission    - Given CR2, will pursue transplantation     2) Individualized Bridging Therapy with Blinatumomab Block to, Day 1:  ** Dexamethasone prior to starting blinatumomab, 12 mg PO x 1  ** Blinatumomab 28 mCg per day IV continuous infusion for 28 days, to start on 5/20/2025.  Will arrange for 24 and or 48-hour bags for the first 3 days and then provide 7-day bag in [facility] to follow.   **Plan to discharge patient to [facility] 5/23/2025 for 7-day bag of blinatumomab  ** Monitor for CRS, monitor for neurologic side effects while inpatient (tremors, seizures)      ** Continue Dasatinib 70 mg PO BID inpatient with patient's home supply, started 3/22/2025     3) Pulmonary Aspergillus:   - Pretransplant CT chest demonstrating lesion in the right minor fissure concerning for infection  - BAL at [facility] demonstrating pulmonary aspergillus  - Started on voriconazole 300 mg PO Q12 hours -will continue as inpatient  - Obtain voriconazole level a 2100 this evening prior to evening dose of voriconazole     4) Pulmonary CMV Infection:   - BAL at [facility] also demonstrating pulmonary and CMV   - Started on valganciclovir 450 mg daily    5) Complete Blood Count:  - CBC PENDING  - Transfuse irradiated PRBC for Hgb<7 g/dL or symptomatic.   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 No clinical lesions  - Chlorhexidine mouthwash 4 times daily  - Levofloxacin 500 mg PO daily  - Valganciclovir as above  - Voriconazole as above    - Seen  by Dr. Carranza today - no evidence of retinitis     7) History of Rothia mucilaginosa Bacteremia:     8) FEN/GI:  - Regular diet     9) Nodule, Right Medial Thigh: (IMPROVED)      10) At Risk For Nausea and Vomiting Secondary To Therapy             - Zofran changed to PRN              - Scopolamine patch every 3 days             - Ativan IV PRN for breakthrough N/V available     11) Transaminitis Secondary To Therapy: (RESOLVED)  - CMP PENDING     12) At Risk For Hypovitaminosis D             - Continue Vitamin D3 supplement daily     13) Central Access:  - Double-lumen CVL placed 2/28/2025 by surgery  - Saline flush per protocol     14) Psychosocial:  - Dr. Ortega following     15) Social:              - Referred to Social Work and financial navigator     Disposition: Admit for three days of ryan Faye MD  Pediatric Hematology / Oncology  [facility]  Cell.  [phone]  Office. [phone]

## 2025-05-19 NOTE — ASSESSMENT & PLAN NOTE
6/3/88599 0- high myopia. Has old glasses and under monocular testing vision improves near baseline. No apparent retinal abnormality  7/25/2022 - continue current rx  11/21/2023-seeing well with current Rx and ground in prism  1/15/2025-slight adjustment in Rx.  Continue base out prism  5/19/2025-seeing well with current Rx

## 2025-05-19 NOTE — ASSESSMENT & PLAN NOTE
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 Blurry vision secondary to asthenopia from overlapping images and acuity improves under monocular conditions. 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 Less likely infectious process given no papilledema, headaches or other meningeal signs. Could also be some form of ischemic process if BP fluctuations during induction. Recommend MRI orbits with citlali and repeat LP. Recommend that he could wear eye patch.   7/25/2022 - Has almost full abduction of the left eye, but has a relatively comitant esotropia. Uncertain if this is as the nerve heals, or being left with a comitant strabismus. In PAT did well wih a 12 base out prism OS. Will re-eval in 4 to 6 weeks and if stable will grind in or if improved will taper the prism  8/31/2022 - Doing will with the prism glasses. Showing some slight improvement. Measuring 10 diopters today. Thus will continue to monitor   11/9/2022 - stable at around 10 prisms. Placed new press on prism. However has vision benefit and considering new rx, so will give with 5 base out to grind in.  Discussed waiting 8 months to a year prior to strabismus surgical repair  11/21/2023 - Has rx prism 5 base out ground in OU.  Doing well orthotropic in primary position.  Extraocular muscles essentially full suggesting following improvement of 6th nerve palsy is left with a competent esotropia.  Since it has not progressed and happy with prisms we will continue to monitor  1/15/2025-overall stable.  Doing well with 5 base out prism OU.  Ground in.  Would continue.  Slight adjustment in distance myopic correction  5/19/2025-stable with current prism glasses

## 2025-05-19 NOTE — ASSESSMENT & PLAN NOTE
11/21/2023-IOP stable.  Slight increased cup-to-disc.  Optic nerve head appearance is stable.  Does have slight change in OCT neurofibrillary thickness that will monitor.  OCT neurofibrillary thickness OD 85 OS 87 today  1/15/2025-IOP stable.  Slight increased cup-to-disc with tilting.  OCT neurofibrillary thickness stable at 84 OD 87 OS  5/19/2025-IOP normal.  Tilted optic nerve head with slight increased cup-to-disc ratio however no progression.  Has had some slow decline in the superior-inferior quadrants.  However there is no cupping.  I did review a MRI scan from 2022 and there was no compressive lesion involving the suprasellar cistern.  Will therefore monitor.  OCT neurofibrillary thickness of today 80 OD 87 OS

## 2025-05-19 NOTE — PROGRESS NOTES
Peds/Neuro Ophthalmology:   Ephraim Carranza M.D.    Date & Time note created:    5/19/2025   3:43 PM     Referring MD / APRN:  Margarito Arvizu M.D., No att. providers found    Patient ID:  Name:             Tomas Jean-Baptiste   YOB: 2000  Age:                 24 y.o.  male   MRN:               [id number]    Chief Complaint/Reason for Visit:     Other (Lukemia)      History of Present Illness:    JULY Jean-Baptiste is a 24 y.o. male   Follow up for Lukemia.Patient having treatment today.Being admitted today at [facility] for cancer treatment.    Other        Review of Systems:  Review of Systems   All other systems reviewed and are negative.      Past Medical History:   Past Medical History[1]    Past Surgical History:  Past Surgical History[2]    Current Outpatient Medications:  Current Medications[3]    Allergies:  Allergies[4]    Family History:  Family History   Problem Relation Age of Onset    No Known Problems Mother     Stroke Maternal Grandmother     Diabetes Paternal Grandfather     Hypertension Paternal Grandfather     Hyperlipidemia Paternal Grandfather     Cancer Neg Hx     Heart Disease Neg Hx        Social History:  Social History     Socioeconomic History    Marital status: Single     Spouse name: Not on file    Number of children: Not on file    Years of education: Not on file    Highest education level: Not on file   Occupational History    Not on file   Tobacco Use    Smoking status: Never     Passive exposure: Never    Smokeless tobacco: Never   Vaping Use    Vaping status: Never Used   Substance and Sexual Activity    Alcohol use: Never    Drug use: Never    Sexual activity: Not Currently   Other Topics Concern    Behavioral problems Not Asked    Interpersonal relationships Not Asked    Sad or not enjoying activities Not Asked    Suicidal thoughts Not Asked    Poor school performance Not Asked    Reading difficulties Not Asked    Speech difficulties Not Asked     Writing difficulties Not Asked    Inadequate sleep Not Asked    Excessive TV viewing Not Asked    Excessive video game use Not Asked    Inadequate exercise Not Asked    Sports related Not Asked    Poor diet Not Asked    Family concerns for drug/alcohol abuse Not Asked    Poor oral hygiene Not Asked    Bike safety Not Asked    Family concerns vehicle safety Not Asked   Social History Narrative    [place]      Social Drivers of Health     Financial Resource Strain: Not on file   Food Insecurity: No Food Insecurity (5/13/2025)    Received from [facility]    Hunger Vital Sign     Worried About Running Out of Food in the Last Year: Never true     Ran Out of Food in the Last Year: Never true   Transportation Needs: No Transportation Needs (4/24/2025)    PRAPARE - Transportation     Lack of Transportation (Medical): No     Lack of Transportation (Non-Medical): No   Physical Activity: Not on file   Stress: Not on file   Social Connections: Not on file   Intimate Partner Violence: Not At Risk (4/24/2025)    Humiliation, Afraid, Rape, and Kick questionnaire     Fear of Current or Ex-Partner: No     Emotionally Abused: No     Physically Abused: No     Sexually Abused: No   Housing Stability: High Risk (4/24/2025)    Housing Stability Vital Sign     Unable to Pay for Housing in the Last Year: No     Number of Times Moved in the Last Year: 2     Homeless in the Last Year: No          Physical Exam:  Physical Exam    Oriented x 3  Weight/BMI: There is no height or weight on file to calculate BMI.  There were no vitals taken for this visit.    Base Eye Exam       Visual Acuity (Snellen - Linear)         Right Left    Dist cc 20/25 20/25    Near cc J1+ J1+      Correction: Glasses              Tonometry (i care, 2:40 PM)         Right Left    Pressure 23 20              Tonometry #2 (3:37 PM)         Right Left    Pressure 16 16              Pupils         Pupils    Right PERRL    Left PERRL               Extraocular Movement         Right Left     Full Full              Neuro/Psych       Oriented x3: Yes    Mood/Affect: Normal                  Additional Tests       Color         Right Left    Ishihara 9/9 9/9              Stereo       Fly: +    Animals: 3/3    Circles: 3/9                  Slit Lamp and Fundus Exam       External Exam         Right Left    External Normal Normal              Slit Lamp Exam         Right Left    Lids/Lashes Normal Normal    Conjunctiva/Sclera White and quiet White and quiet    Cornea Clear Clear    Anterior Chamber Deep and quiet Deep and quiet    Iris Round and reactive Round and reactive    Lens Clear Clear    Vitreous Normal Normal              Fundus Exam         Right Left    Disc Tilted disc Tilted disc    C/D Ratio 0.3 0.3    Macula Normal Normal    Vessels Normal Normal    Periphery Normal Normal                  Refraction       Wearing Rx         Sphere Cylinder Axis Horz Prism Vert Prism    Right -8.00 +3.00 091 4.75O 8.50D    Left -8.00 +2.50 093 5.75O 6.75D      Age: 6m    Type: SVL              Manifest Refraction (Auto)         Sphere Cylinder Axis    Right -7.25 +2.50 097    Left -7.25 +2.50 082                    Pertinent Lab/Test/Imaging Review:      Assessment and Plan:     Glaucoma suspect of both eyes  11/21/2023-IOP stable.  Slight increased cup-to-disc.  Optic nerve head appearance is stable.  Does have slight change in OCT neurofibrillary thickness that will monitor.  OCT neurofibrillary thickness OD 85 OS 87 today  1/15/2025-IOP stable.  Slight increased cup-to-disc with tilting.  OCT neurofibrillary thickness stable at 84 OD 87 OS  5/19/2025-IOP normal.  Tilted optic nerve head with slight increased cup-to-disc ratio however no progression.  Has had some slow decline in the superior-inferior quadrants.  However there is no cupping.  I did review a MRI scan from 2022 and there was no compressive lesion involving the suprasellar cistern.  Will  therefore monitor.  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 Blurry vision secondary to asthenopia from overlapping images and acuity improves under monocular conditions. 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 Less likely infectious process given no papilledema, headaches or other meningeal signs. Could also be some form of ischemic process if BP fluctuations during induction. Recommend MRI orbits with citlali and repeat LP. Recommend that he could wear eye patch.   7/25/2022 - Has almost full abduction of the left eye, but has a relatively comitant esotropia. Uncertain if this is as the nerve heals, or being left with a comitant strabismus. In PAT did well wih a 12 base out prism OS. Will re-eval in 4 to 6 weeks and if stable will grind in or if improved will taper the prism  8/31/2022 - Doing will with the prism glasses. Showing some slight improvement. Measuring 10 diopters today. Thus will continue to monitor   11/9/2022 - stable at around 10 prisms. Placed new press on prism. However has vision benefit and considering new rx, so will give with 5 base out to grind in.  Discussed waiting 8 months to a year prior to strabismus surgical repair  11/21/2023 - Has rx prism 5 base out ground in OU.  Doing well orthotropic in primary position.  Extraocular muscles essentially full suggesting following improvement of 6th nerve palsy is left with a competent esotropia.  Since it has not progressed and happy with prisms we will continue to monitor  1/15/2025-overall stable.  Doing well with 5 base out prism OU.  Ground in.  Would continue.  Slight adjustment in distance myopic correction  5/19/2025-stable with current prism glasses    Myopia of both eyes  6/3/26398 0- high myopia. Has old glasses and under monocular testing vision improves near baseline. No apparent retinal abnormality  7/25/2022 - continue current  rx  11/21/2023-seeing well with current Rx and ground in prism  1/15/2025-slight adjustment in Rx.  Continue base out prism  5/19/2025-seeing well with current Rx    [place] chromosome positive acute lymphoblastic leukemia (HCC)  8/31/2022 - Having bone marrow biopsy tomorrow  11/9/2022 - End of march is end of treatment, one XRT left. Has port and still getting some MTX  11/21/2023 - Finished XRT in July. Systemic chemo continuing.   1/15/2025-now off chemotherapy and s/p XRT.  Doing well.  Being followed by hematology oncology.  No development of retinal vascular abnormality  5/19/2025-asked to see prior to any bone marrow transplantation.  There is no evidence of any intraocular inflammation, there is no retinitis.    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        Ephraim Carranza M.D.         [1]   Past Medical History:  Diagnosis Date    6th nerve palsy     Blood clotting disorder ([facility]) 2022    right calf - blood thinners    Cancer ([facility])     Leukoma     Neuropathy     Neuropathy     toes    Psychiatric problem     anxiety   [2]   Past Surgical History:  Procedure Laterality Date    FL THERAPEUTIC SPINAL PUNCTURE DRAINAGE CSF N/A 4/4/2025    Procedure: LUMBAR PUNCTURE, WITH METHOTREXATE ADMINITERED;  Surgeon: Pepe Faye M.D.;  Location: SURGERY SAME DAY [facility];  Service: Pain Management    BONE ASPIRATION BIOPSY N/A 4/4/2025    Procedure: ASPIRATION, BONE MARROW;  Surgeon: Pepe Faye M.D.;  Location: SURGERY SAME DAY [facility];  Service: Pain Management    PB REMOVAL TUNNELED CV CATH N/A 5/20/2024    Procedure: PORT REMOVAL;  Surgeon: Simona Moise M.D.;  Location: SURGERY [facility];  Service: General    INCISION AND DRAINAGE Left 2/17/2023    Procedure: INCISION AND DRAINAGE OF LEFT SHOULDER;  Surgeon: Luke Haddad,  M.D.;  Location: SURGERY [facility];  Service: General    CATH PLACEMENT Right 8/29/2022    Procedure: INSERTION, CATHETER;  Surgeon: Simona Moise M.D.;  Location: SURGERY [facility];  Service: Ent   [3]   Current Outpatient Medications   Medication Sig Dispense Refill    famotidine (PEPCID) 20 MG Tab Take 20 mg by mouth 2 times a day.      voriconazole (VFEND) 200 MG Recon Soln Infuse  into a venous catheter.      valGANciclovir (VALCYTE) 450 MG tablet Take 450 mg by mouth every day.      levoFLOXacin (LEVAQUIN) 500 MG tablet Take 1 Tablet by mouth every 24 hours. 30 Tablet 0    dasatinib (SPRYCEL) 70 MG tablet Take 1 Tablet by mouth 2 times a day. 60 Tablet 4    sulfamethoxazole-trimethoprim (BACTRIM DS) 800-160 MG tablet Take 1 Tablet by mouth 2 times a day. 16 Tablet 3    LORazepam (ATIVAN) 1 MG Tab Take 1 Tablet by mouth at bedtime as needed (Nausea and Vomiting) for up to 180 days. 30 Tablet 5    senna-docusate (SENNA-S) 8.6-50 MG Tab Take 1 Tablet by mouth every day. 30 Tablet 0    ondansetron (ZOFRAN ODT) 4 MG TABLET DISPERSIBLE Take 2 Tablets by mouth every 6 hours as needed for Nausea/Vomiting. 20 Tablet 3    calcium carbonate (TUMS EX) 750 MG chewable tablet Chew 2 Tablets 3 times a day with meals. 30 Tablet 2    vitamin D (CHOLECALCIFEROL) 1000 UNIT Tab Take 1 Tablet by mouth every day. (Patient not taking: Reported on 5/19/2025) 60 Tablet 2     No current facility-administered medications for this visit.   [4]   Allergies  Allergen Reactions    Amoxicillin Rash     Reacted as an infant. No swelling or airway problems.  Tolerated cefepime June 2022

## 2025-05-19 NOTE — ASSESSMENT & PLAN NOTE
8/31/2022 - Having bone marrow biopsy tomorrow  11/9/2022 - End of march is end of treatment, one XRT left. Has port and still getting some MTX  11/21/2023 - Finished XRT in July. Systemic chemo continuing.   1/15/2025-now off chemotherapy and s/p XRT.  Doing well.  Being followed by hematology oncology.  No development of retinal vascular abnormality  5/19/2025-asked to see prior to any bone marrow transplantation.  There is no evidence of any intraocular inflammation, there is no retinitis.

## 2025-05-20 LAB
ALBUMIN SERPL BCP-MCNC: 3.8 G/DL (ref 3.2–4.9)
ALBUMIN/GLOB SERPL: 1.5 G/DL
ALP SERPL-CCNC: 78 U/L (ref 30–99)
ALT SERPL-CCNC: 8 U/L (ref 2–50)
ANION GAP SERPL CALC-SCNC: 10 MMOL/L (ref 7–16)
ANISOCYTOSIS BLD QL SMEAR: ABNORMAL
AST SERPL-CCNC: 26 U/L (ref 12–45)
BASOPHILS # BLD AUTO: 0.2 % (ref 0–1.8)
BASOPHILS # BLD: 0.01 K/UL (ref 0–0.12)
BILIRUB SERPL-MCNC: <0.2 MG/DL (ref 0.1–1.5)
BUN SERPL-MCNC: 6 MG/DL (ref 8–22)
CALCIUM ALBUM COR SERPL-MCNC: 8.8 MG/DL (ref 8.5–10.5)
CALCIUM SERPL-MCNC: 8.6 MG/DL (ref 8.5–10.5)
CHLORIDE SERPL-SCNC: 104 MMOL/L (ref 96–112)
CO2 SERPL-SCNC: 22 MMOL/L (ref 20–33)
COMMENT 1642: NORMAL
CREAT SERPL-MCNC: 0.55 MG/DL (ref 0.5–1.4)
EOSINOPHIL # BLD AUTO: 0.05 K/UL (ref 0–0.51)
EOSINOPHIL NFR BLD: 0.8 % (ref 0–6.9)
ERYTHROCYTE [DISTWIDTH] IN BLOOD BY AUTOMATED COUNT: 66.3 FL (ref 35.9–50)
GFR SERPLBLD CREATININE-BSD FMLA CKD-EPI: 142 ML/MIN/1.73 M 2
GLOBULIN SER CALC-MCNC: 2.5 G/DL (ref 1.9–3.5)
GLUCOSE SERPL-MCNC: 155 MG/DL (ref 65–99)
HCT VFR BLD AUTO: 30.9 % (ref 42–52)
HGB BLD-MCNC: 9.5 G/DL (ref 14–18)
IMM GRANULOCYTES # BLD AUTO: 0.03 K/UL (ref 0–0.11)
IMM GRANULOCYTES NFR BLD AUTO: 0.5 % (ref 0–0.9)
LYMPHOCYTES # BLD AUTO: 1.48 K/UL (ref 1–4.8)
LYMPHOCYTES NFR BLD: 22.9 % (ref 22–41)
MACROCYTES BLD QL SMEAR: ABNORMAL
MCH RBC QN AUTO: 28.5 PG (ref 27–33)
MCHC RBC AUTO-ENTMCNC: 30.7 G/DL (ref 32.3–36.5)
MCV RBC AUTO: 92.8 FL (ref 81.4–97.8)
MICROCYTES BLD QL SMEAR: ABNORMAL
MONOCYTES # BLD AUTO: 0.47 K/UL (ref 0–0.85)
MONOCYTES NFR BLD AUTO: 7.3 % (ref 0–13.4)
MORPHOLOGY BLD-IMP: NORMAL
NEUTROPHILS # BLD AUTO: 4.43 K/UL (ref 1.82–7.42)
NEUTROPHILS NFR BLD: 68.3 % (ref 44–72)
NRBC # BLD AUTO: 0 K/UL
NRBC BLD-RTO: 0 /100 WBC (ref 0–0.2)
OVALOCYTES BLD QL SMEAR: NORMAL
PLATELET # BLD AUTO: 164 K/UL (ref 164–446)
PLATELET BLD QL SMEAR: NORMAL
PMV BLD AUTO: 7.4 FL (ref 9–12.9)
POIKILOCYTOSIS BLD QL SMEAR: NORMAL
POTASSIUM SERPL-SCNC: 3.9 MMOL/L (ref 3.6–5.5)
PROT SERPL-MCNC: 6.3 G/DL (ref 6–8.2)
RBC # BLD AUTO: 3.33 M/UL (ref 4.7–6.1)
RBC BLD AUTO: PRESENT
SODIUM SERPL-SCNC: 136 MMOL/L (ref 135–145)
WBC # BLD AUTO: 6.5 K/UL (ref 4.8–10.8)

## 2025-05-20 PROCEDURE — 700105 HCHG RX REV CODE 258: Performed by: PEDIATRICS

## 2025-05-20 PROCEDURE — A9270 NON-COVERED ITEM OR SERVICE: HCPCS | Performed by: PEDIATRICS

## 2025-05-20 PROCEDURE — 85025 COMPLETE CBC W/AUTO DIFF WBC: CPT

## 2025-05-20 PROCEDURE — 770004 HCHG ROOM/CARE - ONCOLOGY PRIVATE *

## 2025-05-20 PROCEDURE — 700111 HCHG RX REV CODE 636 W/ 250 OVERRIDE (IP): Mod: JW,TB | Performed by: PEDIATRICS

## 2025-05-20 PROCEDURE — 700102 HCHG RX REV CODE 250 W/ 637 OVERRIDE(OP): Performed by: PEDIATRICS

## 2025-05-20 PROCEDURE — 80053 COMPREHEN METABOLIC PANEL: CPT

## 2025-05-20 PROCEDURE — 36415 COLL VENOUS BLD VENIPUNCTURE: CPT

## 2025-05-20 PROCEDURE — 99232 SBSQ HOSP IP/OBS MODERATE 35: CPT | Performed by: PEDIATRICS

## 2025-05-20 RX ORDER — DEXAMETHASONE 4 MG/1
12 TABLET ORAL ONCE
Status: COMPLETED | OUTPATIENT
Start: 2025-05-20 | End: 2025-05-20

## 2025-05-20 RX ORDER — ACETAMINOPHEN 650 MG/1
650 SUPPOSITORY RECTAL EVERY 6 HOURS PRN
Status: DISCONTINUED | OUTPATIENT
Start: 2025-05-20 | End: 2025-05-20

## 2025-05-20 RX ORDER — ONDANSETRON 2 MG/ML
8 INJECTION INTRAMUSCULAR; INTRAVENOUS EVERY 8 HOURS PRN
Status: DISCONTINUED | OUTPATIENT
Start: 2025-05-20 | End: 2025-05-23 | Stop reason: HOSPADM

## 2025-05-20 RX ORDER — ACETAMINOPHEN 325 MG/1
650 TABLET ORAL EVERY 6 HOURS PRN
Status: DISCONTINUED | OUTPATIENT
Start: 2025-05-20 | End: 2025-05-23 | Stop reason: HOSPADM

## 2025-05-20 RX ORDER — ETHYL ALCOHOL 62 %
1 SWAB, MEDICATED TOPICAL 2 TIMES DAILY
Status: DISCONTINUED | OUTPATIENT
Start: 2025-05-20 | End: 2025-05-23 | Stop reason: HOSPADM

## 2025-05-20 RX ORDER — LORAZEPAM 2 MG/ML
1 CONCENTRATE ORAL EVERY 6 HOURS PRN
Status: DISCONTINUED | OUTPATIENT
Start: 2025-05-20 | End: 2025-05-23 | Stop reason: HOSPADM

## 2025-05-20 RX ADMIN — VALGANCICLOVIR 450 MG: 450 TABLET, FILM COATED ORAL at 08:15

## 2025-05-20 RX ADMIN — DASATINIB 70 MG: 70 TABLET ORAL at 06:33

## 2025-05-20 RX ADMIN — DEXAMETHASONE 12 MG: 4 TABLET ORAL at 15:30

## 2025-05-20 RX ADMIN — Medication 1 APPLICATOR: at 20:08

## 2025-05-20 RX ADMIN — DOCUSATE SODIUM 50 MG AND SENNOSIDES 8.6 MG 1 TABLET: 8.6; 5 TABLET, FILM COATED ORAL at 06:31

## 2025-05-20 RX ADMIN — BLINATUMOMAB 28 MCG: KIT INTRAVENOUS at 16:28

## 2025-05-20 RX ADMIN — LEVOFLOXACIN 500 MG: 500 TABLET, FILM COATED ORAL at 06:32

## 2025-05-20 RX ADMIN — VORICONAZOLE 300 MG: 200 TABLET ORAL at 06:31

## 2025-05-20 RX ADMIN — VORICONAZOLE 300 MG: 200 TABLET ORAL at 16:27

## 2025-05-20 RX ADMIN — DASATINIB 70 MG: 70 TABLET ORAL at 16:47

## 2025-05-20 RX ADMIN — ANTACID TABLETS 1500 MG: 500 TABLET, CHEWABLE ORAL at 13:11

## 2025-05-20 RX ADMIN — ONDANSETRON 8 MG: 2 INJECTION INTRAMUSCULAR; INTRAVENOUS at 16:38

## 2025-05-20 RX ADMIN — ANTACID TABLETS 1500 MG: 500 TABLET, CHEWABLE ORAL at 08:15

## 2025-05-20 RX ADMIN — ANTACID TABLETS 1500 MG: 500 TABLET, CHEWABLE ORAL at 16:26

## 2025-05-20 ASSESSMENT — PAIN DESCRIPTION - PAIN TYPE
TYPE: ACUTE PAIN
TYPE: ACUTE PAIN

## 2025-05-20 NOTE — PROGRESS NOTES
"Pharmacy Chemotherapy Verification Note:    Dx: relapsed B-ALL (ph+)        Protocol: Blincyto + TKI Consolidation     Blinatumomab (Blincyto) 28 mcg IV continuous infusion over 24 hrs daily on Days 1-28  Dasatinib 140 mg PO daily on Days 1-42 (or ponatinib 15 mg PO daily on Days 1-42)   - on Dasatinib 70 mg BID since induction (POM)  42-day cycle until transplant, disease progression, or unacceptable toxicity  NCCN Guidelines for ALL. V.2.2024.  Meg DUMAS et al. NEJM. 2020;383(17):1613-23.  david Quinones al. Lancet Haematol. 2023;10(1):e24-e34.    Allergies:  Amoxicillin     /83   Pulse 83   Temp 36.8 °C (98.2 °F) (Oral)   Resp 18   Ht 1.65 m (5' 4.96\")   Wt 66.8 kg (147 lb 4.3 oz)   SpO2 98%   BMI 24.54 kg/m²  Body surface area is 1.75 meters squared.    Labs from 5/20/25 reviewed - all within treatment parameters.     Drug Order   (Drug name, dose, route, IV Fluid & volume, frequency, number of doses) Cycle: 2 Day 1      Previous treatment: C1 - last bag charted on 5/6/25, stopped early per transplant team, then transplant delayed for pulmonary infectious workup.      Medication = blinatumomab  Base Dose = 28 mcg/day  Calc Dose: Base Dose x 1 day = 28 mg  Final Dose = 28 mg  Route = CIVI  Fluid & Volume =  mL (+ overfill)  Admin Duration = Over 24 hrs via CADD pump @ 10 mL/hr   Days 1-28  Bag size may change to accommodate up to 7 day infusion       <10% difference, okay to treat with final dose     By my signature below, I confirm this process was performed independently with the BSA and all final chemotherapy dosing calculations congruent. I have reviewed the above chemotherapy order and that my calculation of the final dose and BSA (when applicable) corroborate those calculations of the  pharmacist.     Judy Bryant, PharmD, BCOP   "

## 2025-05-20 NOTE — PROGRESS NOTES
Chemotherapy Verification - SECONDARY RN       Height = 165cm  Weight = 66.8kg  BSA = 1.75m2       Medication: blinatumomab  Dose: 28mcg  Calculated Dose: n/a (ordered dose: 28mcg)                             (In mg/m2, AUC, mg/kg)     I confirm that this process was performed independently.

## 2025-05-20 NOTE — DISCHARGE PLANNING
Care Transition Team Assessment    Patient is a 24 year-old male admitted for Acute lymphoid leukemia in remission. Please see patient's H&P for prior medical history. 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 LMSW met with patient at bedside to complete assessment. Patient is A&Ox4 and able to verify the information on the face sheet. Patient lives with girlfrienmartin Viera ([phone]) in a 1 story house with 0 steps to enter, Bhupinder Gresham (p) [phone]; NOK and emergency contact. No Advance Directive on file, patient currently working on completing AD and will provide hospital with copy. Prior to admission patient is independent with ADL's and IADL’s. Patient does not use any DME at baseline. Patient reported that lorenzofrienmartin Viera is good support for discharge to home. Patient reports, they work at [employer], but is receiving temporary disability and getting 80% of is paycheck and plans to try for long-term disability and will receive 70% of his paycheck. The patient's PCP is Dr. Margarito Arvizu. Patient's preferred pharmacy is [facility] on [address]. Patient denies a history of SNF/IPR nor HHC use in the past. Patient denies any SA or MH concerns. Patient's confirmed medical coverage via [employer] and secondary Medicare. Patient has means of transportation when medically cleared and lorenzofrienmartin Viera will provide transport once stable for discharge. Patient's primary Oncologist is Dr. Faye.    Information Source  Orientation Level: Oriented X4  Information Given By: Patient  Who is responsible for making decisions for patient? : Patient    Readmission Evaluation  Is this a readmission?: Yes - planned readmission    Elopement Risk  Legal Hold: No  Ambulatory or Self Mobile in Wheelchair: Yes  Disoriented: No  Psychiatric Symptoms: None  History of Wandering: No  Elopement this Admit: No  Vocalizing Wanting to  Leave: No  Displays Behaviors, Body Language Wanting to Leave: No-Not at Risk for Elopement  Elopement Risk: Not at Risk for Elopement    Interdisciplinary Discharge Planning  Lives with - Patient's Self Care Capacity: Significant Other  Patient or legal guardian wants to designate a caregiver: No  Support Systems: Family Member(s)  Housing / Facility: [address]    Discharge Preparedness  What is your plan after discharge?: Home with help  What are your discharge supports?: Parent, Partner  Prior Functional Level: Ambulatory  Difficulity with ADLs: None  Difficulity with IADLs: None    Functional Assesment  Prior Functional Level: Ambulatory    Finances  Financial Barriers to Discharge: No  Prescription Coverage: Yes    Vision / Hearing Impairment  Vision Impairment : Yes  Right Eye Vision: Wears Glasses  Left Eye Vision: Wears Glasses  Hearing Impairment : No         Advance Directive  Advance Directive?: None    Domestic Abuse  Possible Abuse/Neglect Reported to:: Not Applicable    Psychological Assessment  History of Substance Abuse: None  History of Psychiatric Problems: No  Non-compliant with Treatment: No  Newly Diagnosed Illness: No    Discharge Risks or Barriers  Discharge risks or barriers?: Complex medical needs  Patient risk factors: Complex medical needs    Anticipated Discharge Information  Discharge Disposition: Discharged to home/self care (01)

## 2025-05-20 NOTE — PROGRESS NOTES
4 Eyes Skin Assessment Completed by MONTSERRAT Spaulding and MONTSERRAT Song.    Head WDL  Ears WDL  Nose WDL  Mouth WDL  Neck WDL  Breast/Chest WDL  Shoulder Blades WDL  Spine WDL  (R) Arm/Elbow/Hand WDL  (L) Arm/Elbow/Hand WDL  Abdomen WDL  Groin WDL  Scrotum/Coccyx/Buttocks WDL  (R) Leg WDL  (L) Leg WDL  (R) Heel/Foot/Toe WDL  (L) Heel/Foot/Toe WDL          Devices In Places Central Line      Interventions In Place Pillows    Possible Skin Injury No    Pictures Uploaded Into Epic N/A  Wound Consult Placed N/A  RN Wound Prevention Protocol Ordered No

## 2025-05-20 NOTE — PROGRESS NOTES
Chemotherapy Verification - PRIMARY RN      Height = 165 cm  Weight = 66.8 kg  BSA = 1.75 m2       Medication: Blinatumomab  Dose: 28 mcg/day fixed dose  Calculated Dose: fixed dose                             (In mg/m2, AUC, mg/kg)       I confirm this process was performed independently with the BSA and all final chemotherapy dosing calculations congruent.  Any discrepancies of 10% or greater have been addressed with the chemotherapy pharmacist. The resolution of the discrepancy has been documented in the EPIC progress notes.

## 2025-05-20 NOTE — PROGRESS NOTES
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 time demonstrated a WBC of 0.4, Hgb 8.8 and platelets 125.  ANC at the time was measured at only 170.  JULY was otherwise clinically well appearing.  He did receive a one-time normal saline bolus for his nausea and vomiting and was sent home with instructions to HOLD his oral mercaptopurine and oral methotrexate which began being held on 7/28/2023.  Per protocol, imatinib was continued at previous dose of 600 mg in the morning. Tomas Roy would return to clinic again on 8/2/2023 and 8/7/2023.  On both occasions, ANC remained under 500 and oral therapy (with the exception of imatinib) continue to be held while awaiting count recovery.  Ultimately, on 8/11/2023 after 14 days of therapy being held, Tomas Roy returned to clinic and WBC had increased to 2.1 with ANC increasing to 1500 with an absolute monocyte count of 290. Tomas Roy was then instructed to resume his oral chemotherapy at 100% of  prior dosing with (due to timing with Day 1 of Cycle 2 with LP 3 days later, no weekly MTX was administered).  Imatinib was never held.  On 8/14/2023 he was seen in clinic for Day 1 of Cycle 2 of Maintenance with lumbar puncture, vincristine, and 5-day pulse of steroids.  At the time, his ANC was 2010 and his oral chemotherapy was continued at 100% dosing.  Given his history of previously drop in counts, he was scheduled to come back for repeat labs on 8/29/2023 at which point his WBC count was 1.4 and his ANC remained greater than 500 at 1140.  Again, his therapy was continued with imatinib 550 mg by mouth in the morning as well as 100% dosing of methotrexate and 100% dosing of 6-mercaptopurine.  When Tomas Roy returned to clinic on 9/11/2023 for his Maintenance, Cycle 2, Day 29 therapy he was noted to have had another drop in his WBC to 600 and his ANC accordingly was also decreased at 410.  He did receive vincristine in accordance with protocol as well as starting a 5-day pulse of prednisone per protocol.  Given however that his ANC had dropped below 500 his oral methotrexate and oral 6-MP were again held.  He was instructed return to clinic 1 week later for lab evaluations.  On 9/19/2023 he returned to clinic and WBC had improved slightly to 0.8 however ANC remained low at 260 with an absolute monocyte count of 220.  Given that counts not recovered his oral chemotherapy remained held for an additional week.  On 9/26/2023 at 14 days after initially holding chemotherapy, he was seen back in clinic at which time WBC improved again to 1.1 however ANC did not quite recover and remained at 490 with an absolute monocyte count of 330.  Given that 2 weeks had elapsed with myelosuppression, imatinib was held in addition to oral 6-MP and methotrexate. Tomas Roy was instructed to return to clinic on 9/29/2023 for repeat counts.  On 9/29/2023 WBC had improved to 2.4 and ANC had finally recovered with 1530 and  an absolute monocyte count of 510.  Given a recovery with ANC greater than 750 and platelets greater than 75, oral chemotherapy was restarted at 50% of initial dosing and imatinib was restarted at 100% of initial dosing.  On 10/9/2023, Tomas Roy returned to clinic for his Day 57 of Cycle 2 visit.  At the time of his visit, his ANC had recovered after holding chemotherapy and then restarting at 50% dosing 11 days prior.  He did however in clinic spiked a temperature 102.5 °F.  For this reason despite his ANC being improved, no dose adjustments were made in his chemotherapy.  He continued with 50% dosing with 100% adherence of his 6-MP and methotrexate as well as his imatinib at 550 mg PO QHS.   Tomas Roy was then seen in clinic again on 11/6/2023 for his Day 1 of Cycle 3 of Maintenance therapy.  At the time, his imatinib dose was adjusted back up to 600 mg daily taken in the morning.  Additionally, his methotrexate was adjusted back up to 75% of expected dosing and his mercaptopurine was increased to 75% of expected dosing as well.  He will return to clinic on 12/4/2023 for his Day 29 of Cycle 3 therapy.  At the time, his ANC was exactly 1500 and therefore no dose adjustments were made and he continued at 75% dosing for oral chemotherapy as well as the imatinib dose of 600 mg daily.  On 1/2/2024 he was seen for his Day 57 evaluations and his ANC had dropped to 1450 again not prompting any change in oral chemotherapy dosing.  Tomas Roy completed his Cycle 4 chemotherapy without any adverse events or complications.  He did not have any changes in medications either.  Most recently,  Tomas Roy was seen in clinic on 4/22/2023 for his Day 1 of Cycle 5 chemotherapy.  At the time, a lumbar puncture was performed and IT chemotherapy was provided.  He tolerated the procedure and the therapy well without any sequelae.  His ANC at the time was > 1500 however the dose adjustment was made as this was  "the first ANC greater than 1500 and Tomas Roy had a history of precipitous drops in his counts with increases in his oral chemotherapy.  On 5/20/2024, Tomas Roy presented to clinic for his Cycle 5, Day 29 therapy and evaluations. At that time, he was started on 5 day pulse of prednisone and his oral methotrexate dose was increased to 13 tablets PO weekly (90.78% of expected dosing). His port was removed on 5/20/2024 by Dr. Moise. He completed therapy on 5/30/2024.  Since his completion of therapy and poor removal, he has been seen in follow-up and was most recently seen on 1/15/2025 at which point there was no evidence of relapse or recurrence both clinically or in his labs.  Interval history however is remarkable for upper respiratory symptoms approximately 1-1/2 weeks ago.  At the time, he reported having some nausea and vomiting as well as an upset stomach and sore throat.  The symptoms were thought most likely to be viral and in fact improved consistent with viral illness.  On about 2/24/2025 however symptoms returned and were more flulike in nature with aches and pains and low-grade temperatures.  On the evening of 2/26/2025, JULY called Pediatric Hematology/Oncology to report that he \"feels like I am relapsing\". Tomas Roy was brought in to clinic today for evaluations.  In clinic, CBC, CMP, respiratory viral panel and EBV studies were obtained.  Respiratory viral studies were negative.  CBC however demonstrated a white blood cell count of 1, hemoglobin 10.6, platelets of 53 without any circulating blasts.  His CMP for the most part was normal with mild hyponatremia.  Uric acid was 7.2 and an LDH of 244.  Given these lab values as well as a temperature of 100.6 while in clinic also in the context of an acute infection of his right fourth phalanx, decision was made to bring JULY back to the hospital for admission for fever and neutropenia as well as workup of presumed relapsed disease.  He was " admitted on 2/27/2025.  Workup was remarkable for pancytopenia.  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  In addition to his diagnostic bone marrow and diagnostic lumbar puncture which was negative for disease (CNS 1) he also had a 7 [place] double-lumen Broviac placed on the same day.  He was started on high-dose prednisone on his admission.  After results confirmed relapsed disease, his case was discussed with [facility] and it was decided that he should receive a 3 drug reinduction.  As such, lumbar puncture with intrathecal chemotherapy was administered on 3/6/2025 and he was given his Day 1 vincristine.  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  On 3/22/2025, Tomas Roy reported significant perirectal pain, especially with stooling and also reported bloody and mucus filled stools at home.  For this reason in addition to not feeling well he was brought in for admission.  On admission he was noted to be neutropenic and also developed fever prompting his stay for fever and neutropenia.  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   Tomas Roy had his End of Reinduction evaluations on 4/4/2025.  Bone marrow at the time demonstrated a minuscule population of atypical lymphocytes initially thought to be consistent with residual disease however below the level of detection.  CAR-T workup had been performed and there was a plan to enroll on a dual CAR protocol at [facility].  Ultimately, Tomas Roy was discharged from the hospital on 4/9/2025 with plans to follow-up at [facility] on 4/14/2025.  Upon his presentation to [facility], there was concern however that his disease status was in complete remission  and/or he had lost CD19 and CD22 expression.  For this reason, rather than being enrolled on study, repeat bone marrow was obtained at [place].  Repeat bone marrow did not demonstrate any residual disease consistent with complete remission.  BCR-ABL RT-PCR was also consistent with these findings.  Given complete remission, Tomas Roy was no longer considered a candidate for CAR-T and he was discharged back home to [place] to receive consolidating immune therapy prior to transplantation at [facility].  On 4/24/2025 he was admitted for Blinatumomab Block 1 therapy.  His hospitalization was complicated by fever, clinical sepsis without identification as well as body aches and pains.  His clinical sepsis was thought to be due to adrenal insufficiency and he was started on Solu-Cortef and ultimately placed on wean before his discharge on 4/30/2025..  He was ultimately discharged on 4/30/2025.  Interval history was remarkable for completion of his transplant workup and travel to [place] for proposed transplant.  At [place], follow-up of abnormal findings on CT of the chest with a BAL demonstrated both CMV as well as Aspergillus.  For this reason, he was started on valganciclovir as well as voriconazole.  Given these new findings, his transplant was placed on hold temporarily.  He was collected however for his Tallo10 study before being sent back to [place] for another month of blinatumomab and Dasatinib therapy.  Today, he presents for admission for another bridging block of Blinatumomab Block 2.      On presentation, Tomas Roy reports that clinically he is exceptionally well.  He has not had any recent or remote illness.  Did call about a presumptive fever the other night in the context of feeling exceptionally well.  Upon reevaluation it was noted that his thermometer was broken.  No complaints of any headaches, changes in vision or neurologic status changes. Tomas Roy was seen in clinic with  Dr. Carranza this morning and per report in the medical record, no retinal evidence of CMV or Aspergillus.  There are no complaints of any shortness of breath, cough or difficulty breathing.  No nausea, vomiting, diarrhea or constipation.  No bleeding per rectum.  No complaints of any skin changes or rashes.  No aches or pains.  Per Tomas Roy, taking voriconazole, valganciclovir, Dasatinib and weekend Bactrim with 100% adherence.  No other concerns or complaints at this time.    Review of Systems:     Constitutional: Afebrile.  No recent or remote illness.  Energy and activity are at baseline.  HENT: Negative for auditory changes, nosebleeds and sore throat.  No mouth sores.  Eyes: Negative for visual changes.  Respiratory: Negative for  shortness of breath and stridor.   Cardiovascular: Negative for chest pain and leg swelling.    Gastrointestinal: Negative for nausea, vomiting, abdominal pain, diarrhea, constipation and blood in stool.    Genitourinary: Negative for dysuria and flank pain.    Musculoskeletal: Positive for chronic filgrastim induced bone pain.    Skin: Negative for rash, signs of infection.  Neurological: Negative for numbness, tingling, sensory changes, weakness or headaches.    Endo/Heme/Allergies: Does not bruise/bleed easily.    Psychiatric/Behavioral: No changes in mood, appropriate for age.     PAST MEDICAL HISTORY:     Oncologic History:  2-3 week history of worsening fatigue, right lower extremity pain  Presentation to OS and diagnosed with right LE superficial thrombus, subsegmental PE and hyperleukocytosis, anemia and thrombocytopenia  Transferred to [facility] for definitive care  Presenting (local) WBC > 440K, Hgb 10.0, platelets 53, (automated differential ANC 3190, ALC 75,310, absolute monocyte count 35629, absolute blast count 340,560)  Uric Acid 15.6, phosphorus 5.6, LDH 1114  Rasburicase x 1 dose given   Peripheral Blood flow cytometry demonstrating CD10 pos, CD19 pos, CD20  neg, CD22 dim (60%) 5/28/2022  Peripheral blood FISH for BCR-ABL1 positive in 98% of analyzed cells     Age at Diagnosis: 21 years  White Blood Cell Count at Presentation: > 440 k/uL  Testicular Disease Status: Negative (see procedure note 5/30/2022)  CNS Status: 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, diagnostic LP with WBC 1, RBC 3 and no evidence of leukemic blasts 5/30/2022  Steroid Pre-treatment: None  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     All inclusion/exclusion criteria for [id number] met and consent signed - enrolled 5/29/2022   All inclusion/exclusion criteria for [id number] met and consent signed - enrolled 5/30/2022  Confirmatory bone marrow aspirate and biopsy and diagnostic LP + cytarabine 70 mg IT 5/30/2022  Induction therapy (ON STUDY [id number]) started 5/30/2022  Bone marrow immunohistochemistry consistent with diagnosis of B-ALL comprising 90% of marrow cellularity  Bone marrow sample sent to [facility] for COG purposes:  Flow cytom  Of the blood pressure little high that is a problem is a cultural problem is well and cultural genetic and everything else like that unfortunately breathalyzers such bad heart disease diabetes things like that  populations etry consistent with peripheral blood, cytogenetics remarkable for known t(9;22)  CSF with WBC 1, RBC 3, no blasts identified on cytospin  FISH results available 5/31/2022 making patient eligible for transfer from [place] [address] to [place] [address] as eligibility requirements were met for [place] [address]  Patient unenrolled from [place] [address] (BCR-ABL1 fusion positive) 6/1/2022  Consent signed for [place] [address] and patient enrolled 6/1/2022 (see eligibility checklist from 5/31/2022 and consent note from 6/1/2022)  Imatinib 400 mg PO QAM / 200 mg PO QPM started 6/3/2022 (allowed per [place] [address])  Patient completed the first 15 days of a Standard 4-drug Induction on 6/13/2022  Start of COG  "[id number](OS), Induction IA Part 2, Day 15 6/13/2022  End of Induction 1A Part 2 - MRD negative  Start of [facility] [id number](OS), Induction IA Part 2, Day 15 7/5/2022  Induction IB DELAYED 2 weeks 14 days from 7/26/2022-8/9/2022) for myelosuppression - Start of Day 22 cytarabine block 8/9/2022  Induction IB Day 42 delayed 9 days for myelosuppression - Day 42 evaluations 9/7/2022  End of Induction IB - Flow cytometric MRD negative, MRD by IgH-TCR PCR 00.2942011%  Randomization to AR-Experimental Arm B of [id number]  Start of AR-Experimental Arm B, Interim Maintenance 9/29/2022  Weight based increase in dose of imatinib to 400 mg PO AM and 250 mg PM 9/29/2022  Thrombocytopenia not permissive of proceeding with Day 15 of Interim Maintenance  AR-Experimental Arm B, Interim Maintenance, Day 15 delayed 4 days, start 10/17/2022  AR-Experimental Arm B, Interim Maintenance, Day 29, start 11/1/2022  AR-Experimental Arm B, Interim Maintenance, Day 43, start 11/14/2022  Last does of 6-MP 11/27/2022  AR-Experimental Arm B, Delayed Intensification, Day 1, start 12/6/2022  Admission with Severe Septic Shock 12/27/2022  Imatinib HELD 12/27/2022-1/8/2023  AR-Experimental Arm B, Delayed Intensification, Day 29 DELAYED 14 days with start 1/17/2023  Hospitalization 2/7/2023 on Day 50 of Delayed Intensification with left shoulder pain ultimately diagnosed with Bacteroides fragilis infection  AR-Experimental Arm B, Interim Maintenance, Day 1 DELAYED 7 days with start 2/27/2023  AR-Experimental Arm B, Interim Maintenance, Day 11 DELAYED 4 days for platelets 43K on 3/9/2023  AR-Experimental Arm B, Interim Maintenance, Day 11 VCR given and MTX omitted for platelets 43K 3/13/2023  Imatinib HELD 3/30/2023-4/11/2023  AR-Experimental Arm B, Interim Maintenance, Day 21 (DELAYED 22 DAYS) - administered 4/11/2023 with methotrexate 80 mg/m² IV  AR-Experimental Arm B, Interim Maintenance, Day 31 (\"true\" Day 31 4/25/2023) - VCR and IT MTX given 4/28/2023 - IV " MTX omitted  AR-Experimental Arm B, Interim Maintenance, Day 41 met with counts - administered 5/5/2023 with methotrexate 80 mg/m² IV     Start of Maintenance, Cycle 1, Day 1 -5/22/2023  Cranial radiation 6/5/2023-6/16/2023 (10 fractions)  Maintenance, Cycle 1, Day 29 - 6/23/2023 (no IT MTX given)  Maintenance, Cycle 1, Day 67, presented with fatigue nausea and vomiting found to have ANC less than 500  Methotrexate (oral) and mercaptopurine held 7/27/2023 - continued with imatinib  Methotrexate (oral) and mercaptopurine held 8/2/2023 - continued with imatinib  Methotrexate (oral) and mercaptopurine held 8/7/2023 - continued with imatinib  Restarted methotrexate (oral) and mercaptopurine at 100% previous dosing on 8/11/2023 - continued with imatinib  Maintenance, Cycle 2, Day 1 - 8/14/2023  Maintenance, Cycle 2, Day 29 - 9/11/2023  Methotrexate (oral) and mercaptopurine held 9/11/2023 - continued with imatinib  Methotrexate (oral) and mercaptopurine held 9/19/2023 - continued with imatinib  Methotrexate (oral) and mercaptopurine held 9/26/2023 - HELD imatinib  Methotrexate (oral) restarted at 50% dosing and mercaptopurine restarted at 50% dosing 9/29/2023 - RESTARTED imatinib  Maintenance, Cycle 2, Day 57 - 10/9/2023  Maintenance, Cycle 3, Day 1 - 11/6/2023: Methotrexate (oral) increased to 75% dosing and mercaptopurine increased to 75% dosing.  Imatinib increased to 600 mg QAM 11/6/2023  Maintenance Cycle 3, Day 29: 12/4/2023 No changes made to the dosing of oral chemotherapy  Maintenance, Cycle 4, Day 1: No dose adjustments made however ANC slightly greater than >1500.  Oral chemotherapy did not need weight adjustment.  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  confering resistance to imatinib     Imatinib discontinued, dasatanib started 3/22/2025    End of Re-Induction 4/4/2025 demonstrating suspicious population of phenotypically atypical cells at 0.005%  BCR-ABL RT-PCR 4/4/2025 detectable at 0.514325%    Repeat bone marrow evaluation at [facility] 4/15/2025 MRD negative  BCR-ABL RT-PCR 4/15/2025 undetectable    Blinatumomab Block 1 4/24/2025  Tallo10 collection last week  Blinatumomab Block 2 5/19/2025     Past Medical History:    1) Previously Healthy  2) 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  3) Hyperleukocytosis  4) Hyperuricemia  5) Hyperphosphatemia  6) Right Lower Extremity Superficial Thrombus  7) Subsegmental Pulmonary Embolism  8) 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  9) Bacteroides fragilis soft tissue infection left shoulder  10) Anxiety/Depression  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  Both maternal and paternal family history of diabetes.     Immunizations:  UTD     Social History:   Lives with girlfriend.  Graduated from college.  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  Social situation with family is fractured.    Medications:   No current facility-administered medications on file prior to encounter.     Current Outpatient Medications on File Prior to Encounter   Medication Sig Dispense Refill    levoFLOXacin (LEVAQUIN) 500 MG tablet Take 1 Tablet by mouth every 24 hours. 30 Tablet 0    dasatinib (SPRYCEL) 70 MG tablet Take 1 Tablet by mouth 2 times a day. 60 Tablet 4    sulfamethoxazole-trimethoprim (BACTRIM DS) 800-160 MG tablet  "Take 1 Tablet by mouth 2 times a day. 16 Tablet 3    calcium carbonate (TUMS EX) 750 MG chewable tablet Chew 2 Tablets 3 times a day with meals. 30 Tablet 2    vitamin D (CHOLECALCIFEROL) 1000 UNIT Tab Take 1 Tablet by mouth every day. (Patient not taking: Reported on 5/19/2025) 60 Tablet 2    LORazepam (ATIVAN) 1 MG Tab Take 1 Tablet by mouth at bedtime as needed (Nausea and Vomiting) for up to 180 days. 30 Tablet 5    senna-docusate (SENNA-S) 8.6-50 MG Tab Take 1 Tablet by mouth every day. 30 Tablet 0    ondansetron (ZOFRAN ODT) 4 MG TABLET DISPERSIBLE Take 2 Tablets by mouth every 6 hours as needed for Nausea/Vomiting. 20 Tablet 3     OBJECTIVE:     Vitals:   /75   Pulse (!) 109   Temp 36.9 °C (98.5 °F) (Oral)   Resp 18   Ht 1.65 m (5' 4.96\")   Wt 66.8 kg (147 lb 4.3 oz)   SpO2 97%   BMI 24.54 kg/m²     Labs:    CBC, CMP on admission    Physical Exam:    Constitutional: Well-developed, well-nourished, and in no distress.  Very well-appearing.  HENT: Normocephalic and atraumatic. No nasal congestion or rhinorrhea. Oropharynx is clear and moist. No oral ulcerations or sores.    Eyes: Conjunctivae are normal. Pupils are equal, round.  EOMI.  Nonicteric.  Glasses.  Neck: Normal range of motion of neck, no adenopathy.    Cardiovascular: Normal rate, regular rhythm and normal heart sounds.  No murmur heard. DP/radial pulses 2+, cap refill < 2 sec.  Pulmonary/Chest: Effort normal and breath sounds normal. No respiratory distress. Symmetric expansion.  No crackles or wheezes.  Abdomen: Soft. Bowel sounds are normal. No distension and no mass. There is no hepatosplenomegaly.    Genitourinary:  Deferred.  Musculoskeletal: Normal range of motion of lower and upper extremities bilaterally. No tenderness to palpation of elbows, wrists, hands, knees, ankles and feet bilaterally.   No longer tender, 1 cm firm palpable nodule in the right medial thigh..  Lymphadenopathy: No cervical adenopathy.  Neurological: Alert " and oriented to person and place. Exhibits normal muscle tone bilaterally in upper and lower extremities. Gait normal. Coordination normal.    Skin: Skin is warm, dry and pink.  No rash or evidence of skin infection.  No pallor.   Psychiatric: Mood and affect normal for age.    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 Likely plan for HSCT, search for MUD ongoing. After discussing multiple options, they decided on a 3-Drug Re-Induction (VCR/PRED/PEG-ASP) +Imatinib followed by blinatumomab. 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 Met virtually with Dr. Adrian, [place] [facility] 3/20/2025     - Has had  discussions with both bone marrow transplant as well as some therapeutics (CAR-T) at [facility].  Complete remission after re-induction, making CAR-T ineligible  - End of Reinduction evaluations demonstrating phenotypically similar aberrant lymphoblast population of 0.005% (however lacking CD19 or CD22)   - Repeat BM at [facility] confirming remission    - Given CR2, will pursue transplantation     2) Individualized Bridging Therapy with Blinatumomab Block to, Day 1:  ** Dexamethasone prior to starting blinatumomab, 12 mg PO x 1  ** Blinatumomab 28 mCg per day IV continuous infusion for 28 days, to start on 5/20/2025.  Will arrange for 24 and or 48-hour bags for the first 3 days and then provide 7-day bag in [facility] to follow.   **Plan to discharge patient to [facility] 5/23/2025 for 7-day bag of blinatumomab  ** Monitor for CRS, monitor for neurologic side effects while inpatient (tremors, seizures)      ** Continue Dasatinib 70 mg PO BID inpatient with patient's home supply, started 3/22/2025     3) Pulmonary Aspergillus:   - Pretransplant CT chest demonstrating lesion in the right minor fissure concerning for infection  - BAL at [facility] demonstrating pulmonary aspergillus  - Started on voriconazole 300 mg PO Q12 hours -will continue as inpatient  - Obtain voriconazole level a 2100 this evening prior to evening dose of voriconazole     4) Pulmonary CMV Infection:   - BAL at [facility] also demonstrating pulmonary and CMV   - Started on valganciclovir 450 mg daily    5) Complete Blood Count:  - CBC PENDING  - Transfuse irradiated PRBC for Hgb<7 g/dL or symptomatic.   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 No clinical lesions  - Chlorhexidine mouthwash  4 times daily  - Levofloxacin 500 mg PO daily  - Valganciclovir as above  - Voriconazole as above    - Seen by Dr. Carranza today - no evidence of retinitis     7) History of Rothia mucilaginosa Bacteremia:     8) FEN/GI:  - Regular diet     9) Nodule, Right Medial Thigh: (IMPROVED)      10) At Risk For Nausea and Vomiting Secondary To Therapy             - Zofran changed to PRN              - Scopolamine patch every 3 days             - Ativan IV PRN for breakthrough N/V available     11) Transaminitis Secondary To Therapy: (RESOLVED)  - CMP PENDING     12) At Risk For Hypovitaminosis D             - Continue Vitamin D3 supplement daily     13) Central Access:  - Double-lumen CVL placed 2/28/2025 by surgery  - Saline flush per protocol     14) Psychosocial:  - Dr. Ortega following     15) Social:              - Referred to Social Work and financial navigator     Disposition: Admit for three days of blinatumomab. Starting Blinatumumab 5/20/2025.       Time spent 20 minutes more than half of which is related to B ALL.

## 2025-05-20 NOTE — CARE PLAN
The patient is Watcher - Medium risk of patient condition declining or worsening    Shift Goals  Clinical Goals: Infection prevention    Progress made toward(s) clinical / shift goals:        Problem: Knowledge Deficit - Standard  Goal: Patient and family/care givers will demonstrate understanding of plan of care, disease process/condition, diagnostic tests and medications  Outcome: Progressing     Problem: Safety  Goal: Will remain free from injury  Outcome: Progressing     Problem: Infection  Goal: Will remain free from infection  Outcome: Progressing       Patient is not progressing towards the following goals:N/A

## 2025-05-20 NOTE — PROGRESS NOTES
"Pharmacy Chemotherapy Calculation:    Dx: Relapsed B-ALL (ph+)     Cycle 2, Day 1  Previous treatment = C1 D1-20 - stopped early per transplant team. Transplant delayed for pulmonary infectious workup.    Protocol:  Individualized Immunotherapy to Bridge to Transplant, Blinatumomab (+Dasatinib)   Blinatumomab 28 mcg IV continuous infusion over 24 hours daily on Days 1-28  Dasatinib  mg PO daily on Days 1-42    - on Dasatinib 70 mg BID since induction (POM)   42-day cycle for 2-5 cycles until decision to pursue hematopoietic cell transplant, disease progression or unacceptable toxicity    NCCN Guidelines for Acute Lymphoblastic Leukemia V.2.2024.  Ernin DIAZ et al. N Engl J Med. 2020:383(17):9921-9502.5  Richard Neil al. Lancet Haematol. 2023:10(1):e24-e34,    Allergies:  Amoxicillin       /75   Pulse (!) 109   Temp 36.9 °C (98.5 °F) (Oral)   Resp 18   Ht 1.65 m (5' 4.96\")   Wt 66.8 kg (147 lb 4.3 oz)   SpO2 97%   BMI 24.54 kg/m²  Body surface area is 1.75 meters squared.    All lab results 5/20/25 within treatment parameters.     blinatumomab 28 mcg fixed dose   Fixed dose, no calculation needed = 28 mcg CIVI over 24 hours   Bag size may change to accommodate up to 7 day infusion     Galen Wilson, PharmD    "

## 2025-05-21 LAB
BURR CELLS/RBC NFR CSF MANUAL: 0 %
CLARITY CSF: CLEAR
COLOR CSF: COLORLESS
COLOR SPUN CSF: COLORLESS
CSF COMMENTS 1658: NORMAL
CYTOLOGY REG CYTOL: NORMAL
NUC CELL # CSF: 5 CELLS/UL (ref 0–10)
RBC # CSF: 44 CELLS/UL
SPECIMEN VOL CSF: 3 ML
TUBE # CSF: 2
TUBE # CSF: NORMAL

## 2025-05-21 PROCEDURE — A9270 NON-COVERED ITEM OR SERVICE: HCPCS | Performed by: PEDIATRICS

## 2025-05-21 PROCEDURE — 700111 HCHG RX REV CODE 636 W/ 250 OVERRIDE (IP): Mod: JZ | Performed by: PEDIATRICS

## 2025-05-21 PROCEDURE — 3E0R305 INTRODUCTION OF OTHER ANTINEOPLASTIC INTO SPINAL CANAL, PERCUTANEOUS APPROACH: ICD-10-PCS | Performed by: PEDIATRICS

## 2025-05-21 PROCEDURE — 88108 CYTOPATH CONCENTRATE TECH: CPT | Mod: 26 | Performed by: PATHOLOGY

## 2025-05-21 PROCEDURE — 770004 HCHG ROOM/CARE - ONCOLOGY PRIVATE *

## 2025-05-21 PROCEDURE — 88108 CYTOPATH CONCENTRATE TECH: CPT | Performed by: PATHOLOGY

## 2025-05-21 PROCEDURE — 96450 CHEMOTHERAPY INTO CNS: CPT | Performed by: PEDIATRICS

## 2025-05-21 PROCEDURE — 700105 HCHG RX REV CODE 258: Performed by: PEDIATRICS

## 2025-05-21 PROCEDURE — 89051 BODY FLUID CELL COUNT: CPT

## 2025-05-21 PROCEDURE — 99232 SBSQ HOSP IP/OBS MODERATE 35: CPT | Performed by: PEDIATRICS

## 2025-05-21 PROCEDURE — 700101 HCHG RX REV CODE 250: Performed by: PEDIATRICS

## 2025-05-21 PROCEDURE — 700102 HCHG RX REV CODE 250 W/ 637 OVERRIDE(OP): Performed by: PEDIATRICS

## 2025-05-21 RX ORDER — MIDAZOLAM HYDROCHLORIDE 1 MG/ML
2 INJECTION INTRAMUSCULAR; INTRAVENOUS ONCE
Status: COMPLETED | OUTPATIENT
Start: 2025-05-21 | End: 2025-05-21

## 2025-05-21 RX ORDER — PROMETHAZINE HYDROCHLORIDE 25 MG/1
0.25 TABLET ORAL EVERY 6 HOURS PRN
Status: DISCONTINUED | OUTPATIENT
Start: 2025-05-21 | End: 2025-05-23 | Stop reason: HOSPADM

## 2025-05-21 RX ORDER — LIDOCAINE AND PRILOCAINE 25; 25 MG/G; MG/G
CREAM TOPICAL ONCE
Status: COMPLETED | OUTPATIENT
Start: 2025-05-21 | End: 2025-05-21

## 2025-05-21 RX ORDER — MIDAZOLAM HYDROCHLORIDE 1 MG/ML
2 INJECTION INTRAMUSCULAR; INTRAVENOUS ONCE
Status: CANCELLED
Start: 2025-05-21 | End: 2025-05-21

## 2025-05-21 RX ORDER — LIDOCAINE AND PRILOCAINE 25; 25 MG/G; MG/G
CREAM TOPICAL PRN
OUTPATIENT
Start: 2025-06-02

## 2025-05-21 RX ADMIN — LIDOCAINE AND PRILOCAINE 2.5 %: 25; 25 CREAM TOPICAL at 14:41

## 2025-05-21 RX ADMIN — VORICONAZOLE 300 MG: 200 TABLET ORAL at 18:23

## 2025-05-21 RX ADMIN — VALGANCICLOVIR 450 MG: 450 TABLET, FILM COATED ORAL at 05:51

## 2025-05-21 RX ADMIN — DASATINIB 70 MG: 70 TABLET ORAL at 18:24

## 2025-05-21 RX ADMIN — VORICONAZOLE 300 MG: 200 TABLET ORAL at 05:51

## 2025-05-21 RX ADMIN — Medication 1 APPLICATOR: at 05:51

## 2025-05-21 RX ADMIN — DASATINIB 70 MG: 70 TABLET ORAL at 05:51

## 2025-05-21 RX ADMIN — LEVOFLOXACIN 500 MG: 500 TABLET, FILM COATED ORAL at 05:51

## 2025-05-21 RX ADMIN — ANTACID TABLETS 1500 MG: 500 TABLET, CHEWABLE ORAL at 18:23

## 2025-05-21 RX ADMIN — METHOTREXATE 15 MG: 25 INJECTION INTRA-ARTERIAL; INTRAMUSCULAR; INTRATHECAL; INTRAVENOUS at 15:23

## 2025-05-21 RX ADMIN — Medication 1 APPLICATOR: at 20:13

## 2025-05-21 RX ADMIN — MIDAZOLAM HYDROCHLORIDE 2 MG: 1 INJECTION, SOLUTION INTRAMUSCULAR; INTRAVENOUS at 15:21

## 2025-05-21 RX ADMIN — ANTACID TABLETS 1500 MG: 500 TABLET, CHEWABLE ORAL at 07:21

## 2025-05-21 RX ADMIN — BLINATUMOMAB 56 MCG: KIT INTRAVENOUS at 16:00

## 2025-05-21 RX ADMIN — ANTACID TABLETS 1500 MG: 500 TABLET, CHEWABLE ORAL at 12:55

## 2025-05-21 ASSESSMENT — PAIN DESCRIPTION - PAIN TYPE
TYPE: ACUTE PAIN
TYPE: ACUTE PAIN

## 2025-05-21 NOTE — PROGRESS NOTES
Chemotherapy Verification - SECONDARY RN       Height = 165 cm  Weight = 66.8 kg  BSA = 1.75 m2       Medication: blinatumomab  Dose: 28 mcg/day  Calculated Dose: 56 mg (ordered dose: 56 mg over 48 hours)                             (In mg/m2, AUC, mg/kg)       I confirm that this process was performed independently.

## 2025-05-21 NOTE — CARE PLAN
Problem: Knowledge Deficit - Standard  Goal: Patient and family/care givers will demonstrate understanding of plan of care, disease process/condition, diagnostic tests and medications  Outcome: Progressing     Problem: Infection  Goal: Will remain free from infection  Outcome: Progressing     The patient is Watcher - Medium risk of patient condition declining or worsening    Shift Goals  Clinical Goals: Monitor labs, tolerate chemo  Patient Goals: Sleep    Progress made toward(s) clinical / shift goals:  pt updated on POC    Patient is not progressing towards the following goals:

## 2025-05-21 NOTE — PROGRESS NOTES
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 a WBC of 0.4, Hgb 8.8 and platelets 125.  ANC at the time was measured at only 170.  JULY was otherwise clinically well appearing.  He did receive a one-time normal saline bolus for his nausea and vomiting and was sent home with instructions to HOLD his oral mercaptopurine and oral methotrexate which began being held on 7/28/2023.  Per protocol, imatinib was continued at previous dose of 600 mg in the morning. Tomas Roy would return to clinic again on 8/2/2023 and 8/7/2023.  On both occasions, ANC remained under 500 and oral therapy (with the exception of imatinib) continue to be held while awaiting count recovery.  Ultimately, on 8/11/2023 after 14 days of therapy being held, Tomas Roy returned to clinic and WBC had increased to 2.1 with ANC increasing to 1500 with an absolute monocyte count of 290. Tomas Roy was then instructed to resume his oral chemotherapy at 100% of  prior dosing with (due to timing with Day 1 of Cycle 2 with LP 3 days later, no weekly MTX was administered).  Imatinib was never held.  On 8/14/2023 he was seen in clinic for Day 1 of Cycle 2 of Maintenance with lumbar puncture, vincristine, and 5-day pulse of steroids.  At the time, his ANC was 2010 and his oral chemotherapy was continued at 100% dosing.  Given his history of previously drop in counts, he was scheduled to come back for repeat labs on 8/29/2023 at which point his WBC count was 1.4 and his ANC remained greater than 500 at 1140.  Again, his therapy was continued with imatinib 550 mg by mouth in the morning as well as 100% dosing of methotrexate and 100% dosing of 6-mercaptopurine.  When Tomas Roy returned to clinic on 9/11/2023 for his Maintenance, Cycle 2, Day 29 therapy he was noted to have had another drop in his WBC to 600 and his ANC accordingly was also decreased at 410.  He did receive vincristine in accordance with protocol as well as starting a 5-day pulse of prednisone per protocol.  Given however that his ANC had dropped below 500 his oral methotrexate and oral 6-MP were again held.  He was instructed return to clinic 1 week later for lab evaluations.  On 9/19/2023 he returned to clinic and WBC had improved slightly to 0.8 however ANC remained low at 260 with an absolute monocyte count of 220.  Given that counts not recovered his oral chemotherapy remained held for an additional week.  On 9/26/2023 at 14 days after initially holding chemotherapy, he was seen back in clinic at which time WBC improved again to 1.1 however ANC did not quite recover and remained at 490 with an absolute monocyte count of 330.  Given that 2 weeks had elapsed with myelosuppression, imatinib was held in addition to oral 6-MP and methotrexate. Tomas Roy was instructed to return to clinic on 9/29/2023 for repeat counts.  On 9/29/2023 WBC had improved to 2.4 and ANC had finally recovered with 1530 and  an absolute monocyte count of 510.  Given a recovery with ANC greater than 750 and platelets greater than 75, oral chemotherapy was restarted at 50% of initial dosing and imatinib was restarted at 100% of initial dosing.  On 10/9/2023, Tomas Roy returned to clinic for his Day 57 of Cycle 2 visit.  At the time of his visit, his ANC had recovered after holding chemotherapy and then restarting at 50% dosing 11 days prior.  He did however in clinic spiked a temperature 102.5 °F.  For this reason despite his ANC being improved, no dose adjustments were made in his chemotherapy.  He continued with 50% dosing with 100% adherence of his 6-MP and methotrexate as well as his imatinib at 550 mg PO QHS.   Tomas Roy was then seen in clinic again on 11/6/2023 for his Day 1 of Cycle 3 of Maintenance therapy.  At the time, his imatinib dose was adjusted back up to 600 mg daily taken in the morning.  Additionally, his methotrexate was adjusted back up to 75% of expected dosing and his mercaptopurine was increased to 75% of expected dosing as well.  He will return to clinic on 12/4/2023 for his Day 29 of Cycle 3 therapy.  At the time, his ANC was exactly 1500 and therefore no dose adjustments were made and he continued at 75% dosing for oral chemotherapy as well as the imatinib dose of 600 mg daily.  On 1/2/2024 he was seen for his Day 57 evaluations and his ANC had dropped to 1450 again not prompting any change in oral chemotherapy dosing.  Tomas Roy completed his Cycle 4 chemotherapy without any adverse events or complications.  He did not have any changes in medications either.  Most recently,  Tomas Roy was seen in clinic on 4/22/2023 for his Day 1 of Cycle 5 chemotherapy.  At the time, a lumbar puncture was performed and IT chemotherapy was provided.  He tolerated the procedure and the therapy well without any sequelae.  His ANC at the time was > 1500 however the dose adjustment was made as this was  "the first ANC greater than 1500 and Tomas Roy had a history of precipitous drops in his counts with increases in his oral chemotherapy.  On 5/20/2024, Tomas Roy presented to clinic for his Cycle 5, Day 29 therapy and evaluations. At that time, he was started on 5 day pulse of prednisone and his oral methotrexate dose was increased to 13 tablets PO weekly (90.78% of expected dosing). His port was removed on 5/20/2024 by Dr. Moise. He completed therapy on 5/30/2024.  Since his completion of therapy and poor removal, he has been seen in follow-up and was most recently seen on 1/15/2025 at which point there was no evidence of relapse or recurrence both clinically or in his labs.  Interval history however is remarkable for upper respiratory symptoms approximately 1-1/2 weeks ago.  At the time, he reported having some nausea and vomiting as well as an upset stomach and sore throat.  The symptoms were thought most likely to be viral and in fact improved consistent with viral illness.  On about 2/24/2025 however symptoms returned and were more flulike in nature with aches and pains and low-grade temperatures.  On the evening of 2/26/2025, JULY called Pediatric Hematology/Oncology to report that he \"feels like I am relapsing\". Tomas Roy was brought in to clinic today for evaluations.  In clinic, CBC, CMP, respiratory viral panel and EBV studies were obtained.  Respiratory viral studies were negative.  CBC however demonstrated a white blood cell count of 1, hemoglobin 10.6, platelets of 53 without any circulating blasts.  His CMP for the most part was normal with mild hyponatremia.  Uric acid was 7.2 and an LDH of 244.  Given these lab values as well as a temperature of 100.6 while in clinic also in the context of an acute infection of his right fourth phalanx, decision was made to bring JULY back to the hospital for admission for fever and neutropenia as well as workup of presumed relapsed disease.  He was " admitted on 2/27/2025.  Workup was remarkable for pancytopenia.  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  In addition to his diagnostic bone marrow and diagnostic lumbar puncture which was negative for disease (CNS 1) he also had a 7 Samoan double-lumen Broviac placed on the same day.  He was started on high-dose prednisone on his admission.  After results confirmed relapsed disease, his case was discussed with [facility] and it was decided that he should receive a 3 drug reinduction.  As such, lumbar puncture with intrathecal chemotherapy was administered on 3/6/2025 and he was given his Day 1 vincristine.  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  On 3/22/2025, Tomas Roy reported significant perirectal pain, especially with stooling and also reported bloody and mucus filled stools at home.  For this reason in addition to not feeling well he was brought in for admission.  On admission he was noted to be neutropenic and also developed fever prompting his stay for fever and neutropenia.  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   Tomas Roy had his End of Reinduction evaluations on 4/4/2025.  Bone marrow at the time demonstrated a minuscule population of atypical lymphocytes initially thought to be consistent with residual disease however below the level of detection.  CAR-T workup had been performed and there was a plan to enroll on a dual CAR protocol at [facility].  Ultimately, Tomas Roy was discharged from the hospital on 4/9/2025 with plans to follow-up at [place] on 4/14/2025.  Upon his presentation to [place], there was concern however that his disease status was in complete remission  and/or he had lost CD19 and CD22 expression.  For this reason, rather than being enrolled on study, repeat bone marrow was obtained at [place].  Repeat bone marrow did not demonstrate any residual disease consistent with complete remission.  BCR-ABL RT-PCR was also consistent with these findings.  Given complete remission, Tomas Roy was no longer considered a candidate for CAR-T and he was discharged back home to [place] to receive consolidating immune therapy prior to transplantation at [facility].  On 4/24/2025 he was admitted for Blinatumomab Block 1 therapy.  His hospitalization was complicated by fever, clinical sepsis without identification as well as body aches and pains.  His clinical sepsis was thought to be due to adrenal insufficiency and he was started on Solu-Cortef and ultimately placed on wean before his discharge on 4/30/2025..  He was ultimately discharged on 4/30/2025.  Interval history was remarkable for completion of his transplant workup and travel to [place] for proposed transplant.  At [place], follow-up of abnormal findings on CT of the chest with a BAL demonstrated both CMV as well as Aspergillus.  For this reason, he was started on valganciclovir as well as voriconazole.  Given these new findings, his transplant was placed on hold temporarily.  He was collected however for his Tallo10 study before being sent back to [place] for another month of blinatumomab and Dasatinib therapy.  Today, he presents for admission for another bridging block of Blinatumomab Block 2.      On presentation, Tomas Roy reports that clinically he is exceptionally well.  He has not had any recent or remote illness.  Did call about a presumptive fever the other night in the context of feeling exceptionally well.  Upon reevaluation it was noted that his thermometer was broken.  No complaints of any headaches, changes in vision or neurologic status changes. Tomas Roy was seen in clinic with  Dr. Carranza this morning and per report in the medical record, no retinal evidence of CMV or Aspergillus.  There are no complaints of any shortness of breath, cough or difficulty breathing.  No nausea, vomiting, diarrhea or constipation.  No bleeding per rectum.  No complaints of any skin changes or rashes.  No aches or pains.  Per Tomas Roy, taking voriconazole, valganciclovir, Dasatinib and weekend Bactrim with 100% adherence.  No other concerns or complaints at this time.    Review of Systems:     Constitutional: Afebrile.  No recent or remote illness.  Energy and activity are at baseline.  HENT: Negative for auditory changes, nosebleeds and sore throat.  No mouth sores.  Eyes: Negative for visual changes.  Respiratory: Negative for  shortness of breath and stridor.   Cardiovascular: Negative for chest pain and leg swelling.    Gastrointestinal: Negative for nausea, vomiting, abdominal pain, diarrhea, constipation and blood in stool.    Genitourinary: Negative for dysuria and flank pain.    Musculoskeletal: Positive for chronic filgrastim induced bone pain.    Skin: Negative for rash, signs of infection.  Neurological: Negative for numbness, tingling, sensory changes, weakness or headaches.    Endo/Heme/Allergies: Does not bruise/bleed easily.    Psychiatric/Behavioral: No changes in mood, appropriate for age.     PAST MEDICAL HISTORY:     Oncologic History:  2-3 week history of worsening fatigue, right lower extremity pain  Presentation to OS and diagnosed with right LE superficial thrombus, subsegmental PE and hyperleukocytosis, anemia and thrombocytopenia  Transferred [facility] for [facility] care  Presenting (local) WBC > 440K, Hgb 10.0, platelets 53, (automated differential ANC 3190, ALC 75,310, absolute monocyte count 72169, absolute blast count 340,560)  Uric Acid 15.6, phosphorus 5.6, LDH 1114  Rasburicase x 1 dose given   Peripheral Blood flow cytometry demonstrating CD10 pos, CD19 pos, CD20  neg, CD22 dim (60%) 5/28/2022  Peripheral blood FISH for BCR-ABL1 positive in 98% of analyzed cells     Age at Diagnosis: 21 years  White Blood Cell Count at Presentation: > 440 k/uL  Testicular Disease Status: Negative (see procedure note 5/30/2022)  CNS Status: 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, diagnostic LP with WBC 1, RBC 3 and no evidence of leukemic blasts 5/30/2022  Steroid Pre-treatment: None  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     All inclusion/exclusion criteria for [id number] met and consent signed - enrolled 5/29/2022   All inclusion/exclusion criteria for [id number] met and consent signed - enrolled 5/30/2022  Confirmatory bone marrow aspirate and biopsy and diagnostic LP + cytarabine 70 mg IT 5/30/2022  Induction therapy (ON STUDY [id number]) started 5/30/2022  Bone marrow immunohistochemistry consistent with diagnosis of B-ALL comprising 90% of marrow cellularity  Bone marrow [facility] to [facility] for COG purposes:  Flow cytom  Of the blood pressure little high that is a problem is a cultural problem is well and cultural genetic and everything else like that unfortunately breathalyzers such bad heart disease diabetes things like that  populations etry consistent with peripheral blood, cytogenetics remarkable for known t(9;22)  CSF with WBC 1, RBC 3, no blasts identified on cytospin  FISH results available 5/31/2022 making patient eligible for transfer from [place] [address] to [place] [address] as eligibility requirements were met for [place] [address]  Patient unenrolled from [place] [address] (BCR-ABL1 fusion positive) 6/1/2022  Consent signed for [place] [address] and patient enrolled 6/1/2022 (see eligibility checklist from 5/31/2022 and consent note from 6/1/2022)  Imatinib 400 mg PO QAM / 200 mg PO QPM started 6/3/2022 (allowed per [place] [address])  Patient completed the first 15 days of a Standard 4-drug Induction on 6/13/2022  Start of COG  "[id number]), Induction IA Part 2, Day 15 6/13/2022  End of Induction 1A Part 2 - MRD negative  Start of [facility] [id number](OS), Induction IA Part 2, Day 15 7/5/2022  Induction IB DELAYED 2 weeks 14 days from 7/26/2022-8/9/2022) for myelosuppression - Start of Day 22 cytarabine block 8/9/2022  Induction IB Day 42 delayed 9 days for myelosuppression - Day 42 evaluations 9/7/2022  End of Induction IB - Flow cytometric MRD negative, MRD by IgH-TCR PCR 00.1510400%  Randomization to AR-Experimental Arm B of [id number]  Start of AR-Experimental Arm B, Interim Maintenance 9/29/2022  Weight based increase in dose of imatinib to 400 mg PO AM and 250 mg PM 9/29/2022  Thrombocytopenia not permissive of proceeding with Day 15 of Interim Maintenance  AR-Experimental Arm B, Interim Maintenance, Day 15 delayed 4 days, start 10/17/2022  AR-Experimental Arm B, Interim Maintenance, Day 29, start 11/1/2022  AR-Experimental Arm B, Interim Maintenance, Day 43, start 11/14/2022  Last does of 6-MP 11/27/2022  AR-Experimental Arm B, Delayed Intensification, Day 1, start 12/6/2022  Admission with Severe Septic Shock 12/27/2022  Imatinib HELD 12/27/2022-1/8/2023  AR-Experimental Arm B, Delayed Intensification, Day 29 DELAYED 14 days with start 1/17/2023  Hospitalization 2/7/2023 on Day 50 of Delayed Intensification with left shoulder pain ultimately diagnosed with Bacteroides fragilis infection  AR-Experimental Arm B, Interim Maintenance, Day 1 DELAYED 7 days with start 2/27/2023  AR-Experimental Arm B, Interim Maintenance, Day 11 DELAYED 4 days for platelets 43K on 3/9/2023  AR-Experimental Arm B, Interim Maintenance, Day 11 VCR given and MTX omitted for platelets 43K 3/13/2023  Imatinib HELD 3/30/2023-4/11/2023  AR-Experimental Arm B, Interim Maintenance, Day 21 (DELAYED 22 DAYS) - administered 4/11/2023 with methotrexate 80 mg/m² IV  AR-Experimental Arm B, Interim Maintenance, Day 31 (\"true\" Day 31 4/25/2023) - VCR and IT MTX given 4/28/2023 - IV " MTX omitted  AR-Experimental Arm B, Interim Maintenance, Day 41 met with counts - administered 5/5/2023 with methotrexate 80 mg/m² IV     Start of Maintenance, Cycle 1, Day 1 -5/22/2023  Cranial radiation 6/5/2023-6/16/2023 (10 fractions)  Maintenance, Cycle 1, Day 29 - 6/23/2023 (no IT MTX given)  Maintenance, Cycle 1, Day 67, presented with fatigue nausea and vomiting found to have ANC less than 500  Methotrexate (oral) and mercaptopurine held 7/27/2023 - continued with imatinib  Methotrexate (oral) and mercaptopurine held 8/2/2023 - continued with imatinib  Methotrexate (oral) and mercaptopurine held 8/7/2023 - continued with imatinib  Restarted methotrexate (oral) and mercaptopurine at 100% previous dosing on 8/11/2023 - continued with imatinib  Maintenance, Cycle 2, Day 1 - 8/14/2023  Maintenance, Cycle 2, Day 29 - 9/11/2023  Methotrexate (oral) and mercaptopurine held 9/11/2023 - continued with imatinib  Methotrexate (oral) and mercaptopurine held 9/19/2023 - continued with imatinib  Methotrexate (oral) and mercaptopurine held 9/26/2023 - HELD imatinib  Methotrexate (oral) restarted at 50% dosing and mercaptopurine restarted at 50% dosing 9/29/2023 - RESTARTED imatinib  Maintenance, Cycle 2, Day 57 - 10/9/2023  Maintenance, Cycle 3, Day 1 - 11/6/2023: Methotrexate (oral) increased to 75% dosing and mercaptopurine increased to 75% dosing.  Imatinib increased to 600 mg QAM 11/6/2023  Maintenance Cycle 3, Day 29: 12/4/2023 No changes made to the dosing of oral chemotherapy  Maintenance, Cycle 4, Day 1: No dose adjustments made however ANC slightly greater than >1500.  Oral chemotherapy did not need weight adjustment.  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  confering resistance to imatinib     Imatinib discontinued, dasatanib started 3/22/2025    End of Re-Induction 4/4/2025 demonstrating suspicious population of phenotypically atypical cells at 0.005%  BCR-ABL RT-PCR 4/4/2025 detectable at 0.777391%    Re[facility] bone marrow evaluation [facility] 4/15/2025 MRD negative  BCR-ABL RT-PCR 4/15/2025 undetectable    Blinatumomab Block 1 4/24/2025  Tallo10 collection last week  Blinatumomab Block 2 5/19/2025     Past Medical History:    1) Previously Healthy  2) 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  3) Hyperleukocytosis  4) Hyperuricemia  5) Hyperphosphatemia  6) Right Lower Extremity Superficial Thrombus  7) Subsegmental Pulmonary Embolism  8) 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  9) Bacteroides fragilis soft tissue infection left shoulder  10) Anxiety/Depression  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  Both maternal and paternal family history of diabetes.     Immunizations:  UTD     Social History:   Lives with girlfriend.  Graduated from college.  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  Social situation with family is fractured.    Medications:   No current facility-administered medications on file prior to encounter.     Current Outpatient Medications on File Prior to Encounter   Medication Sig Dispense Refill    levoFLOXacin (LEVAQUIN) 500 MG tablet Take 1 Tablet by mouth every 24 hours. 30 Tablet 0    dasatinib (SPRYCEL) 70 MG tablet Take 1 Tablet by mouth 2 times a day. 60 Tablet 4    sulfamethoxazole-trimethoprim (BACTRIM DS) 800-160 MG tablet  "Take 1 Tablet by mouth 2 times a day. 16 Tablet 3    calcium carbonate (TUMS EX) 750 MG chewable tablet Chew 2 Tablets 3 times a day with meals. 30 Tablet 2    vitamin D (CHOLECALCIFEROL) 1000 UNIT Tab Take 1 Tablet by mouth every day. (Patient not taking: Reported on 5/19/2025) 60 Tablet 2    LORazepam (ATIVAN) 1 MG Tab Take 1 Tablet by mouth at bedtime as needed (Nausea and Vomiting) for up to 180 days. 30 Tablet 5    senna-docusate (SENNA-S) 8.6-50 MG Tab Take 1 Tablet by mouth every day. 30 Tablet 0    ondansetron (ZOFRAN ODT) 4 MG TABLET DISPERSIBLE Take 2 Tablets by mouth every 6 hours as needed for Nausea/Vomiting. 20 Tablet 3     OBJECTIVE:     Vitals:   /76   Pulse 83   Temp 36.4 °C (97.5 °F) (Oral)   Resp 16   Ht 1.65 m (5' 4.96\")   Wt 66.8 kg (147 lb 4.3 oz)   SpO2 98%   BMI 24.54 kg/m²     Labs:    CBC, CMP on admission    Physical Exam:    Constitutional: Well-developed, well-nourished, and in no distress.  Very well-appearing.  HENT: Normocephalic and atraumatic. No nasal congestion or rhinorrhea. Oropharynx is clear and moist. No oral ulcerations or sores.    Eyes: Conjunctivae are normal. Pupils are equal, round.  EOMI.  Nonicteric.  Glasses.  Neck: Normal range of motion of neck, no adenopathy.    Cardiovascular: Normal rate, regular rhythm and normal heart sounds.  No murmur heard. DP/radial pulses 2+, cap refill < 2 sec.  Pulmonary/Chest: Effort normal and breath sounds normal. No respiratory distress. Symmetric expansion.  No crackles or wheezes.  Abdomen: Soft. Bowel sounds are normal. No distension and no mass. There is no hepatosplenomegaly.    Genitourinary:  Deferred.  Musculoskeletal: Normal range of motion of lower and upper extremities bilaterally. No tenderness to palpation of elbows, wrists, hands, knees, ankles and feet bilaterally.   No longer tender, 1 cm firm palpable nodule in the right medial thigh..  Lymphadenopathy: No cervical adenopathy.  Neurological: Alert and " oriented to person and place. Exhibits normal muscle tone bilaterally in upper and lower extremities. Gait normal. Coordination normal.    Skin: Skin is warm, dry and pink.  No rash or evidence of skin infection.  No pallor.   Psychiatric: Mood and affect normal for age.    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 Likely plan for HSCT, search for MUD ongoing. After discussing multiple options, they decided on a 3-Drug Re-Induction (VCR/PRED/PEG-ASP) +Imatinib followed by blinatumomab. 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 Met virtually with Dr. Adrian, [place] [facility] 3/20/2025     - Has had  discussions with both bone marrow transplant as well as some therapeutics (CAR-T) at [facility].  Complete remission after re-induction, making CAR-T ineligible  - End of Reinduction evaluations demonstrating phenotypically similar aberrant lymphoblast population of 0.005% (however lacking CD19 or CD22)   - Repeat BM at [place] confirming remission    - Given CR2, will pursue transplantation     2) Individualized Bridging Therapy with Blinatumomab Block to, Day 1:  ** Dexamethasone prior to starting blinatumomab, 12 mg PO x 1  ** Blinatumomab 28 mCg per day IV continuous infusion for 28 days, to start on 5/20/2025.  Will arrange for 24 and or 48-hour bags for the first 3 days and then provide 7-day bag in [facility] to follow.   **Plan to discharge patient to [facility] 5/23/2025 for 7-day bag of blinatumomab  ** Monitor for CRS, monitor for neurologic side effects while inpatient (tremors, seizures)      ** Continue Dasatinib 70 mg PO BID inpatient with patient's home supply, started 3/22/2025     3) Pulmonary Aspergillus:   - Pretransplant CT chest demonstrating lesion in the right minor fissure concerning for infection  - BAL at [facility] demonstrating pulmonary aspergillus  - Started on voriconazole 300 mg PO Q12 hours -will continue as inpatient  - Obtain voriconazole level a 2100 this evening prior to evening dose of voriconazole     4) Pulmonary CMV Infection:   - BAL at [facility] also demonstrating pulmonary and CMV   - Started on valganciclovir 450 mg daily    5) Complete Blood Count:  - CBC PENDING  - Transfuse irradiated PRBC for Hgb<7 g/dL or symptomatic.   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 No clinical lesions  - Chlorhexidine mouthwash  4 times daily  - Levofloxacin 500 mg PO daily  - Valganciclovir as above  - Voriconazole as above    - Seen by Dr. Carranza today - no evidence of retinitis     7) History of Rothia mucilaginosa Bacteremia:     8) FEN/GI:  - Regular diet     9) Nodule, Right Medial Thigh: (IMPROVED)      10) At Risk For Nausea and Vomiting Secondary To Therapy             - Zofran changed to PRN              - Scopolamine patch every 3 days             - Ativan IV PRN for breakthrough N/V available     11) Transaminitis Secondary To Therapy: (RESOLVED)  - CMP PENDING     12) At Risk For Hypovitaminosis D             - Continue Vitamin D3 supplement daily     13) Central Access:  - Double-lumen CVL placed 2/28/2025 by surgery  - Saline flush per protocol     14) Psychosocial:  - Dr. Ortega following     15) Social:              - Referred to Social Work and financial navigator     Disposition: Admit for three days of blinatumomab. Starting Blinatumumab 5/20/2025.     Interval History:    Tolerating blina infusion well. Some sweating last pm. Anticipate DC 5/23/2025      Time spent 20 minutes more than half of which is related to B ALL.

## 2025-05-21 NOTE — PROGRESS NOTES
PEDIATRIC BEHAVIORAL HEALTH VISIT    ADULT REQUEST FOR CONFIDENTIALITY    Name:  Tomas Jean-Baptiste  MRN:  [id number]  :  2000  Age:  24 y.o.  Referring Provider: Dr. Faye (Hem/Onc)  Pediatrician:  Margarito Arvizu M.D.  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 See intake note dated 23. With his current relapse, psychology is meeting with JULY to process and cope with how it is impacting his life.     Mental Status Exam:   General description cooperative with interview  Interactional style Culturally appropriate  Eye contact Appropriate  Speech Unimpaired, fluid and clear, normal rate and rythem  Motor activity Average  Orientation Oriented to person time, place and situation  Intellectual functioning Unimpaired  Memory Unimpaired  Attention and concentration Intact and normative concentration  Fund of knowledge Average  Mood Euthymic   Affect Appropriate   Perceptual Disturbances None apparent  Thought Process  No abnormalities apparent       Associations Unimpaired associations       Abstractions Normal abstractions, intact       Insight Insight - adequate and normative       Judgment Judgments - intact and normative   Thought Content  No apparent delusions    Risk Assessment:  Tomas Roy did not report current concerns regarding risk to self or others.       Issues Discussed:   This provider met with JULY to continue processing the grief he is experiencing around his relapse as well as the estrangement of his mother and siblings. Today we spent the visit processing feelings around the delay in his transplant, differences  between his maternal and paternal sides, and his hopes for his future. Overall, JULY appears to be in a good place regarding feelings about his family.     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 After meeting with JULY during his intake, it appears he could benefit from learning anxiety management skills, processing what he has been through, and how to live the life he wants. In treatment, Tomas Kirill Jean-Baptiste benefited from learning appropriate ways of expressing and coping with his emotions, learning Cognitive Behavioral Therapy (CBT) and Acceptance and Commitment Therapy (ACT) tools to understand the interaction of thoughts, feelings, and actions, as well as Trauma Focused-CBT to process his cancer journey. Currently, he could benefit from processing how relapse is impacting his life and relationships and how the past is influencing him.       PLAN  JULY will learn and utilize appropriate ways to express and cope with emotions.    He will learn the connection between thoughts, feelings, and actions utilizing CBT and ACT tools.,   JULY will process how their medical diagnosis is impacting their life.    This provider will plan to connect with JULY Thursday afternoon.      The above diagnostic impressions, recommendations, and treatment plan were discussed with and agreed upon by Tomas Kirill, and his caregivers. Care will be coordinated with Tomas Roy's healthcare team, as appropriate.    Total time spent on encounter was 90 minutes.    Laurie Ortega, PhD  Pediatric Psychologist   Licensed Psychologist, [place] #  [id number]  [facility], Behavioral Health

## 2025-05-21 NOTE — CARE PLAN
Problem: Knowledge Deficit - Standard  Goal: Patient and family/care givers will demonstrate understanding of plan of care, disease process/condition, diagnostic tests and medications  Outcome: Progressing     Problem: Infection  Goal: Will remain free from infection  Outcome: Progressing     The patient is Watcher - Medium risk of patient condition declining or worsening    Shift Goals  Clinical Goals: Infection prevention    Progress made toward(s) clinical / shift goals:  Pt updated on POC    Patient is not progressing towards the following goals:

## 2025-05-21 NOTE — PROCEDURES
Pediatric Oncology Lumbar Puncture  Procedure Note      Patient Name:  Tomas Jean-Baptiste  : 2000   MRN: [id number]    Service Location:  [facility]  Date of Service: 2025  Time: 3:23 PM    Procedure Performed By: Pepe Faye M.D.    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  He is currently hospitalized for bridging to transplant therapy with Blinatumomab Block 2and scheduled lumbar puncture with intrathecal chemotherapy.  Prior to the procedure, the risks and benefits were discussed with the patient.  Consent for the procedure was signed by JULY and placed in his chart.  All pertinent labs and history were reviewed and a complete physical examination was performed and placed in the medical record.  Tomas Roy remained in his hospital bed where the procedure was performed.  All necessary safety equipment per ASA guidelines were available.  A time-out was performed and the patient identified by name,  and medical record number.  Versed 2 mg IV was administered 10 minutes prior to the procedure.  Gloves and mask were worn during the entire procedure.  Tomas Roy was prepped and draped in the usual sterile fashion with povoiodine.  He was positioned in the left decubitus position and all  landmarks including superior posterior iliac crest, umbilicus and vertebral bodies were identified by palpation.  A 3.5 in, 22 gauge spinal needle was introduced into the L3-L4 spinal interspace.  6 ml of clear fluid were obtained and sent for cell count and cytology.  Methotrexate 15 mg in 6 mL of NS was verified with the nurse bedside and then then administered into the spinal fluid. Tomas Roy tolerated the procedure without complication or bleeding.  He and his parents were instructed that he lie flat for 1 hour following the procedure.    Results:      Pepe Faye MD  Pediatric Hematology / Oncology  [facility]  Cell.  [phone]

## 2025-05-21 NOTE — PROGRESS NOTES
Chemotherapy Verification - PRIMARY RN      Height = 165 cm  Weight = 66.8 kg  BSA = 1.75 m2       Medication: Blinatumomab  Dose: 28 mcg/day  Calculated Dose: 56 mcg for two days                             (In mg/m2, AUC, mg/kg)     Medication: Methotrexate  Dose: 15 mg fixed dose  Calculated Dose: fixed dose                             (In mg/m2, AUC, mg/kg)      I confirm this process was performed independently with the BSA and all final chemotherapy dosing calculations congruent.  Any discrepancies of 10% or greater have been addressed with the chemotherapy pharmacist. The resolution of the discrepancy has been documented in the EPIC progress notes.

## 2025-05-21 NOTE — CARE PLAN
The patient is Stable - Low risk of patient condition declining or worsening    Shift Goals  Clinical Goals: Monitor labs, tolerate chemo  Patient Goals: Sleep    Progress made toward(s) clinical / shift goals:     Problem: Knowledge Deficit - Standard  Goal: Patient and family/care givers will demonstrate understanding of plan of care, disease process/condition, diagnostic tests and medications  Outcome: Progressing     Problem: Safety  Goal: Will remain free from injury  Outcome: Progressing     Problem: Infection  Goal: Will remain free from infection  Outcome: Progressing

## 2025-05-21 NOTE — PROGRESS NOTES
"Pharmacy Chemotherapy Verification Note:    Dx: relapsed B-ALL (ph+)        Protocol: Blincyto + TKI Consolidation     Blinatumomab (Blincyto) 28 mcg IV continuous infusion over 24 hrs daily on Days 1-28  Dasatinib 140 mg PO daily on Days 1-42 (or ponatinib 15 mg PO daily on Days 1-42)   - on Dasatinib 70 mg BID since induction (POM)  42-day cycle until transplant, disease progression, or unacceptable toxicity  NCCN Guidelines for ALL. V.2.2024.  Meg DUMAS et al. NEJM. 2020;383(17):1613-23.  Richard SANDERS et al. Lancet Haematol. 2023;10(1):e24-e34.    CNS ppx:  Methotrexate 12 - 15 mg intrathecal or intraventricular with or without hydrocortisone 50 mg  NCCN Guidelines® for Acute Lymphoblastic Leukemia V.1.2023.  Valarie DUMAS et al. [facility]. 2017;41(n01):105-129.b  Richard HANSEN , et al. Conte Clin Proc. 2005;80(11):1517-27.c  Vinicio  . Hematology Am Soc Hematol Educ Program. 2006:142-6.c    Allergies:  Amoxicillin     /76   Pulse 83   Temp 36.4 °C (97.5 °F) (Oral)   Resp 16   Ht 1.65 m (5' 4.96\")   Wt 66.8 kg (147 lb 4.3 oz)   SpO2 98%   BMI 24.54 kg/m²  Body surface area is 1.75 meters squared.    Labs from 5/20/25 reviewed - all within treatment parameters.     Drug Order   (Drug name, dose, route, IV Fluid & volume, frequency, number of doses) Cycle: 2 Days 2-3      Previous treatment: C1 - last bag charted on 5/6/25, stopped early per transplant team, then transplant delayed for pulmonary infectious workup.      Medication = blinatumomab  Base Dose = 28 mcg/day  Calc Dose: Base Dose x 2 day = 56 mg  Final Dose = 56 mg  Route = CIVI  Fluid & Volume =  mL (+ overfill)  Admin Duration = Over 48 hrs via CADD pump @ 5 mL/hr   Days 1-28  Bag size may change to accommodate up to 7 day infusion       <10% difference, okay to treat with final dose     Medication = IT methotrexate  Base Dose = 15 mg fixed dose  Calc Dose: n/a  Final Dose = 15 mg  Route = intraTHECAL  Fluid & Volume = NS 6 mL  Admin " Duration = to be administered by MD RIVERA today 5/21/25 per Dr Faye        <10% difference, okay to treat with final dose     By my signature below, I confirm this process was performed independently with the BSA and all final chemotherapy dosing calculations congruent. I have reviewed the above chemotherapy order and that my calculation of the final dose and BSA (when applicable) corroborate those calculations of the  pharmacist.     Judy Bryant, PharmD, BCOP

## 2025-05-22 ENCOUNTER — APPOINTMENT (OUTPATIENT)
Dept: ONCOLOGY | Facility: MEDICAL CENTER | Age: 25
End: 2025-05-22
Attending: PEDIATRICS
Payer: COMMERCIAL

## 2025-05-22 LAB — VORICONAZOLE SERPL-MCNC: 8.5 UG/ML (ref 1–6)

## 2025-05-22 PROCEDURE — A9270 NON-COVERED ITEM OR SERVICE: HCPCS | Performed by: PEDIATRICS

## 2025-05-22 PROCEDURE — 770004 HCHG ROOM/CARE - ONCOLOGY PRIVATE *

## 2025-05-22 PROCEDURE — 700111 HCHG RX REV CODE 636 W/ 250 OVERRIDE (IP): Performed by: PEDIATRICS

## 2025-05-22 PROCEDURE — 99232 SBSQ HOSP IP/OBS MODERATE 35: CPT | Performed by: PEDIATRICS

## 2025-05-22 PROCEDURE — 700102 HCHG RX REV CODE 250 W/ 637 OVERRIDE(OP): Performed by: PEDIATRICS

## 2025-05-22 RX ADMIN — DASATINIB 70 MG: 70 TABLET ORAL at 05:38

## 2025-05-22 RX ADMIN — DOCUSATE SODIUM 50 MG AND SENNOSIDES 8.6 MG 1 TABLET: 8.6; 5 TABLET, FILM COATED ORAL at 05:37

## 2025-05-22 RX ADMIN — ANTACID TABLETS 1500 MG: 500 TABLET, CHEWABLE ORAL at 12:11

## 2025-05-22 RX ADMIN — VALGANCICLOVIR 450 MG: 450 TABLET, FILM COATED ORAL at 05:38

## 2025-05-22 RX ADMIN — DASATINIB 70 MG: 70 TABLET ORAL at 17:24

## 2025-05-22 RX ADMIN — LEVOFLOXACIN 500 MG: 500 TABLET, FILM COATED ORAL at 05:37

## 2025-05-22 RX ADMIN — ANTACID TABLETS 1500 MG: 500 TABLET, CHEWABLE ORAL at 17:23

## 2025-05-22 RX ADMIN — VORICONAZOLE 300 MG: 200 TABLET ORAL at 05:37

## 2025-05-22 RX ADMIN — VORICONAZOLE 300 MG: 200 TABLET ORAL at 17:23

## 2025-05-22 RX ADMIN — Medication 1 APPLICATOR: at 05:38

## 2025-05-22 RX ADMIN — ANTACID TABLETS 1500 MG: 500 TABLET, CHEWABLE ORAL at 08:23

## 2025-05-22 RX ADMIN — Medication 1 APPLICATOR: at 17:23

## 2025-05-22 ASSESSMENT — PAIN DESCRIPTION - PAIN TYPE
TYPE: ACUTE PAIN
TYPE: ACUTE PAIN

## 2025-05-22 NOTE — DISCHARGE PLANNING
Case Management Discharge Planning    Admission Date: 5/19/2025  GMLOS: 3.9  ALOS: 3    6-Clicks ADL Score: 24  6-Clicks Mobility Score: 24      Anticipated Discharge Dispo: Discharge Disposition: Discharged to home/self care (01)    DME Needed: No    Action(s) Taken: Discussed in IDT rounds. Patient is receiving in-patient chemotherapy treatment. Should be able to discharge Friday and go home with his girlfriend. [facility] appointment scheduled 6/2/2025     Escalations Completed: None    Medically Clear: No    Next Steps: Case management to follow for further discharge planning.    Barriers to Discharge: Medical clearance    Is the patient up for discharge tomorrow: No

## 2025-05-22 NOTE — PROGRESS NOTES
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 demonstrated a WBC of 0.4, Hgb 8.8 and platelets 125.  ANC at the time was measured at only 170.  JULY was otherwise clinically well appearing.  He did receive a one-time normal saline bolus for his nausea and vomiting and was sent home with instructions to HOLD his oral mercaptopurine and oral methotrexate which began being held on 7/28/2023.  Per protocol, imatinib was continued at previous dose of 600 mg in the morning. Tomas Roy would return to clinic again on 8/2/2023 and 8/7/2023.  On both occasions, ANC remained under 500 and oral therapy (with the exception of imatinib) continue to be held while awaiting count recovery.  Ultimately, on 8/11/2023 after 14 days of therapy being held, Tomas Roy returned to clinic and WBC had increased to 2.1 with ANC increasing to 1500 with an absolute monocyte count of 290. Tomas Roy was then instructed to resume his oral chemotherapy at 100% of  prior dosing with (due to timing with Day 1 of Cycle 2 with LP 3 days later, no weekly MTX was administered).  Imatinib was never held.  On 8/14/2023 he was seen in clinic for Day 1 of Cycle 2 of Maintenance with lumbar puncture, vincristine, and 5-day pulse of steroids.  At the time, his ANC was 2010 and his oral chemotherapy was continued at 100% dosing.  Given his history of previously drop in counts, he was scheduled to come back for repeat labs on 8/29/2023 at which point his WBC count was 1.4 and his ANC remained greater than 500 at 1140.  Again, his therapy was continued with imatinib 550 mg by mouth in the morning as well as 100% dosing of methotrexate and 100% dosing of 6-mercaptopurine.  When Tomas Roy returned to clinic on 9/11/2023 for his Maintenance, Cycle 2, Day 29 therapy he was noted to have had another drop in his WBC to 600 and his ANC accordingly was also decreased at 410.  He did receive vincristine in accordance with protocol as well as starting a 5-day pulse of prednisone per protocol.  Given however that his ANC had dropped below 500 his oral methotrexate and oral 6-MP were again held.  He was instructed return to clinic 1 week later for lab evaluations.  On 9/19/2023 he returned to clinic and WBC had improved slightly to 0.8 however ANC remained low at 260 with an absolute monocyte count of 220.  Given that counts not recovered his oral chemotherapy remained held for an additional week.  On 9/26/2023 at 14 days after initially holding chemotherapy, he was seen back in clinic at which time WBC improved again to 1.1 however ANC did not quite recover and remained at 490 with an absolute monocyte count of 330.  Given that 2 weeks had elapsed with myelosuppression, imatinib was held in addition to oral 6-MP and methotrexate. Tomas Roy was instructed to return to clinic on 9/29/2023 for repeat counts.  On 9/29/2023 WBC had improved to 2.4 and ANC had finally recovered with 1530 and  an absolute monocyte count of 510.  Given a recovery with ANC greater than 750 and platelets greater than 75, oral chemotherapy was restarted at 50% of initial dosing and imatinib was restarted at 100% of initial dosing.  On 10/9/2023, Tomas Roy returned to clinic for his Day 57 of Cycle 2 visit.  At the time of his visit, his ANC had recovered after holding chemotherapy and then restarting at 50% dosing 11 days prior.  He did however in clinic spiked a temperature 102.5 °F.  For this reason despite his ANC being improved, no dose adjustments were made in his chemotherapy.  He continued with 50% dosing with 100% adherence of his 6-MP and methotrexate as well as his imatinib at 550 mg PO QHS.   Tomas Roy was then seen in clinic again on 11/6/2023 for his Day 1 of Cycle 3 of Maintenance therapy.  At the time, his imatinib dose was adjusted back up to 600 mg daily taken in the morning.  Additionally, his methotrexate was adjusted back up to 75% of expected dosing and his mercaptopurine was increased to 75% of expected dosing as well.  He will return to clinic on 12/4/2023 for his Day 29 of Cycle 3 therapy.  At the time, his ANC was exactly 1500 and therefore no dose adjustments were made and he continued at 75% dosing for oral chemotherapy as well as the imatinib dose of 600 mg daily.  On 1/2/2024 he was seen for his Day 57 evaluations and his ANC had dropped to 1450 again not prompting any change in oral chemotherapy dosing.  Tomas Roy completed his Cycle 4 chemotherapy without any adverse events or complications.  He did not have any changes in medications either.  Most recently,  Tomas Roy was seen in clinic on 4/22/2023 for his Day 1 of Cycle 5 chemotherapy.  At the time, a lumbar puncture was performed and IT chemotherapy was provided.  He tolerated the procedure and the therapy well without any sequelae.  His ANC at the time was > 1500 however the dose adjustment was made as this was  "the first ANC greater than 1500 and Tomas Roy had a history of precipitous drops in his counts with increases in his oral chemotherapy.  On 5/20/2024, Tomas Roy presented to clinic for his Cycle 5, Day 29 therapy and evaluations. At that time, he was started on 5 day pulse of prednisone and his oral methotrexate dose was increased to 13 tablets PO weekly (90.78% of expected dosing). His port was removed on 5/20/2024 by Dr. Moise. He completed therapy on 5/30/2024.  Since his completion of therapy and poor removal, he has been seen in follow-up and was most recently seen on 1/15/2025 at which point there was no evidence of relapse or recurrence both clinically or in his labs.  Interval history however is remarkable for upper respiratory symptoms approximately 1-1/2 weeks ago.  At the time, he reported having some nausea and vomiting as well as an upset stomach and sore throat.  The symptoms were thought most likely to be viral and in fact improved consistent with viral illness.  On about 2/24/2025 however symptoms returned and were more flulike in nature with aches and pains and low-grade temperatures.  On the evening of 2/26/2025, JULY called Pediatric Hematology/Oncology to report that he \"feels like I am relapsing\". Tomas Roy was brought in to clinic today for evaluations.  In clinic, CBC, CMP, respiratory viral panel and EBV studies were obtained.  Respiratory viral studies were negative.  CBC however demonstrated a white blood cell count of 1, hemoglobin 10.6, platelets of 53 without any circulating blasts.  His CMP for the most part was normal with mild hyponatremia.  Uric acid was 7.2 and an LDH of 244.  Given these lab values as well as a temperature of 100.6 while in clinic also in the context of an acute infection of his right fourth phalanx, decision was made to bring JULY back to the hospital for admission for fever and neutropenia as well as workup of presumed relapsed disease.  He was " admitted on 2/27/2025.  Workup was remarkable for pancytopenia.  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  In addition to his diagnostic bone marrow and diagnostic lumbar puncture which was negative for disease (CNS 1) he also had a 7 [place] double-lumen Broviac placed on the same day.  He was started on high-dose prednisone on his admission.  After results confirmed relapsed disease, his case was discussed with [facility] and it was decided that he should receive a 3 drug reinduction.  As such, lumbar puncture with intrathecal chemotherapy was administered on 3/6/2025 and he was given his Day 1 vincristine.  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  On 3/22/2025, Tomas Roy reported significant perirectal pain, especially with stooling and also reported bloody and mucus filled stools at home.  For this reason in addition to not feeling well he was brought in for admission.  On admission he was noted to be neutropenic and also developed fever prompting his stay for fever and neutropenia.  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   Tomas Roy had his End of Reinduction evaluations on 4/4/2025.  Bone marrow at the time demonstrated a minuscule population of atypical lymphocytes initially thought to be consistent with residual disease however below the level of detection.  CAR-T workup had been performed and there was a plan to enroll on a dual CAR protocol at [facility].  Ultimately, Tomas Roy was discharged from the hospital on 4/9/2025 with plans to follow-up at [place] on 4/14/2025.  Upon his presentation to [place], there was concern however that his disease status was in complete remission  and/or he had lost CD19 and CD22 expression.  For this reason, rather than being enrolled on study, repeat bone marrow was obtained at [place].  Repeat bone marrow did not demonstrate any residual disease consistent with complete remission.  BCR-ABL RT-PCR was also consistent with these findings.  Given complete remission, Tomas Roy was no longer considered a candidate for CAR-T and he was discharged back home to [place] to receive consolidating immune therapy prior to transplantation at [facility].  On 4/24/2025 he was admitted for Blinatumomab Block 1 therapy.  His hospitalization was complicated by fever, clinical sepsis without identification as well as body aches and pains.  His clinical sepsis was thought to be due to adrenal insufficiency and he was started on Solu-Cortef and ultimately placed on wean before his discharge on 4/30/2025..  He was ultimately discharged on 4/30/2025.  Interval history was remarkable for completion of his transplant workup and travel to [place] for proposed transplant.  At [place], follow-up of abnormal findings on CT of the chest with a BAL demonstrated both CMV as well as Aspergillus.  For this reason, he was started on valganciclovir as well as voriconazole.  Given these new findings, his transplant was placed on hold temporarily.  He was collected however for his Tallo10 study before being sent back to [place] for another month of blinatumomab and Dasatinib therapy.  Today, he presents for admission for another bridging block of Blinatumomab Block 2.      On presentation, Tomas Roy reports that clinically he is exceptionally well.  He has not had any recent or remote illness.  Did call about a presumptive fever the other night in the context of feeling exceptionally well.  Upon reevaluation it was noted that his thermometer was broken.  No complaints of any headaches, changes in vision or neurologic status changes. Tomas Roy was seen in clinic with  Dr. Carranza this morning and per report in the medical record, no retinal evidence of CMV or Aspergillus.  There are no complaints of any shortness of breath, cough or difficulty breathing.  No nausea, vomiting, diarrhea or constipation.  No bleeding per rectum.  No complaints of any skin changes or rashes.  No aches or pains.  Per Tomas Roy, taking voriconazole, valganciclovir, Dasatinib and weekend Bactrim with 100% adherence.  No other concerns or complaints at this time.    Review of Systems:     Constitutional: Afebrile.  No recent or remote illness.  Energy and activity are at baseline.  HENT: Negative for auditory changes, nosebleeds and sore throat.  No mouth sores.  Eyes: Negative for visual changes.  Respiratory: Negative for  shortness of breath and stridor.   Cardiovascular: Negative for chest pain and leg swelling.    Gastrointestinal: Negative for nausea, vomiting, abdominal pain, diarrhea, constipation and blood in stool.    Genitourinary: Negative for dysuria and flank pain.    Musculoskeletal: Positive for chronic filgrastim induced bone pain.    Skin: Negative for rash, signs of infection.  Neurological: Negative for numbness, tingling, sensory changes, weakness or headaches.    Endo/Heme/Allergies: Does not bruise/bleed easily.    Psychiatric/Behavioral: No changes in mood, appropriate for age.     PAST MEDICAL HISTORY:     Oncologic History:  2-3 week history of worsening fatigue, right lower extremity pain  Presentation to OS and diagnosed with right LE superficial thrombus, subsegmental PE and hyperleukocytosis, anemia and thrombocytopenia  Transferred to [facility] for definitive care  Presenting (local) WBC > 440K, Hgb 10.0, platelets 53, (automated differential ANC 3190, ALC 75,310, absolute monocyte count 39643, absolute blast count 340,560)  Uric Acid 15.6, phosphorus 5.6, LDH 1114  Rasburicase x 1 dose given   Peripheral Blood flow cytometry demonstrating CD10 pos, CD19 pos, CD20  neg, CD22 dim (60%) 5/28/2022  Peripheral blood FISH for BCR-ABL1 positive in 98% of analyzed cells     Age at Diagnosis: 21 years  White Blood Cell Count at Presentation: > 440 k/uL  Testicular Disease Status: Negative (see procedure note 5/30/2022)  CNS Status: 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, diagnostic LP with WBC 1, RBC 3 and no evidence of leukemic blasts 5/30/2022  Steroid Pre-treatment: None  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     All inclusion/exclusion criteria for [id number] met and consent signed - enrolled 5/29/2022   All inclusion/exclusion criteria for [id number] met and consent signed - enrolled 5/30/2022  Confirmatory bone marrow aspirate and biopsy and diagnostic LP + cytarabine 70 mg IT 5/30/2022  Induction therapy (ON STUDY [id number]) started 5/30/2022  Bone marrow immunohistochemistry consistent with diagnosis of B-ALL comprising 90% of marrow cellularity  Bone marrow sample sent to [facility] for COG purposes:  Flow cytom  Of the blood pressure little high that is a problem is a cultural problem is well and cultural genetic and everything else like that unfortunately breathalyzers such bad heart disease diabetes things like that  populations etry consistent with peripheral blood, cytogenetics remarkable for known t(9;22)  CSF with WBC 1, RBC 3, no blasts identified on cytospin  FISH results available 5/31/2022 making patient eligible for transfer from [place] [address] to [place] [address] as eligibility requirements were met for [place] [address]  Patient unenrolled from [place] [address] (BCR-ABL1 fusion positive) 6/1/2022  Consent signed for [place] [address] and patient enrolled 6/1/2022 (see eligibility checklist from 5/31/2022 and consent note from 6/1/2022)  Imatinib 400 mg PO QAM / 200 mg PO QPM started 6/3/2022 (allowed per [place] [id number])  Patient completed the first 15 days of a Standard 4-drug Induction on 6/13/2022  Start of COG  "[id number](OS), Induction IA Part 2, Day 15 6/13/2022  End of Induction 1A Part 2 - MRD negative  Start of [facility] [id number](OS), Induction IA Part 2, Day 15 7/5/2022  Induction IB DELAYED 2 weeks 14 days from 7/26/2022-8/9/2022) for myelosuppression - Start of Day 22 cytarabine block 8/9/2022  Induction IB Day 42 delayed 9 days for myelosuppression - Day 42 evaluations 9/7/2022  End of Induction IB - Flow cytometric MRD negative, MRD by IgH-TCR PCR 00.3954638%  Randomization to AR-Experimental Arm B of [id number]  Start of AR-Experimental Arm B, Interim Maintenance 9/29/2022  Weight based increase in dose of imatinib to 400 mg PO AM and 250 mg PM 9/29/2022  Thrombocytopenia not permissive of proceeding with Day 15 of Interim Maintenance  AR-Experimental Arm B, Interim Maintenance, Day 15 delayed 4 days, start 10/17/2022  AR-Experimental Arm B, Interim Maintenance, Day 29, start 11/1/2022  AR-Experimental Arm B, Interim Maintenance, Day 43, start 11/14/2022  Last does of 6-MP 11/27/2022  AR-Experimental Arm B, Delayed Intensification, Day 1, start 12/6/2022  Admission with Severe Septic Shock 12/27/2022  Imatinib HELD 12/27/2022-1/8/2023  AR-Experimental Arm B, Delayed Intensification, Day 29 DELAYED 14 days with start 1/17/2023  Hospitalization 2/7/2023 on Day 50 of Delayed Intensification with left shoulder pain ultimately diagnosed with Bacteroides fragilis infection  AR-Experimental Arm B, Interim Maintenance, Day 1 DELAYED 7 days with start 2/27/2023  AR-Experimental Arm B, Interim Maintenance, Day 11 DELAYED 4 days for platelets 43K on 3/9/2023  AR-Experimental Arm B, Interim Maintenance, Day 11 VCR given and MTX omitted for platelets 43K 3/13/2023  Imatinib HELD 3/30/2023-4/11/2023  AR-Experimental Arm B, Interim Maintenance, Day 21 (DELAYED 22 DAYS) - administered 4/11/2023 with methotrexate 80 mg/m² IV  AR-Experimental Arm B, Interim Maintenance, Day 31 (\"true\" Day 31 4/25/2023) - VCR and IT MTX given 4/28/2023 - IV " MTX omitted  AR-Experimental Arm B, Interim Maintenance, Day 41 met with counts - administered 5/5/2023 with methotrexate 80 mg/m² IV     Start of Maintenance, Cycle 1, Day 1 -5/22/2023  Cranial radiation 6/5/2023-6/16/2023 (10 fractions)  Maintenance, Cycle 1, Day 29 - 6/23/2023 (no IT MTX given)  Maintenance, Cycle 1, Day 67, presented with fatigue nausea and vomiting found to have ANC less than 500  Methotrexate (oral) and mercaptopurine held 7/27/2023 - continued with imatinib  Methotrexate (oral) and mercaptopurine held 8/2/2023 - continued with imatinib  Methotrexate (oral) and mercaptopurine held 8/7/2023 - continued with imatinib  Restarted methotrexate (oral) and mercaptopurine at 100% previous dosing on 8/11/2023 - continued with imatinib  Maintenance, Cycle 2, Day 1 - 8/14/2023  Maintenance, Cycle 2, Day 29 - 9/11/2023  Methotrexate (oral) and mercaptopurine held 9/11/2023 - continued with imatinib  Methotrexate (oral) and mercaptopurine held 9/19/2023 - continued with imatinib  Methotrexate (oral) and mercaptopurine held 9/26/2023 - HELD imatinib  Methotrexate (oral) restarted at 50% dosing and mercaptopurine restarted at 50% dosing 9/29/2023 - RESTARTED imatinib  Maintenance, Cycle 2, Day 57 - 10/9/2023  Maintenance, Cycle 3, Day 1 - 11/6/2023: Methotrexate (oral) increased to 75% dosing and mercaptopurine increased to 75% dosing.  Imatinib increased to 600 mg QAM 11/6/2023  Maintenance Cycle 3, Day 29: 12/4/2023 No changes made to the dosing of oral chemotherapy  Maintenance, Cycle 4, Day 1: No dose adjustments made however ANC slightly greater than >1500.  Oral chemotherapy did not need weight adjustment.  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  confering resistance to imatinib     Imatinib discontinued, dasatanib started 3/22/2025    End of Re-Induction 4/4/2025 demonstrating suspicious population of phenotypically atypical cells at 0.005%  BCR-ABL RT-PCR 4/4/2025 detectable at 0.913062%    Repeat bone marrow evaluation at [facility] 4/15/2025 MRD negative  BCR-ABL RT-PCR 4/15/2025 undetectable    Blinatumomab Block 1 4/24/2025  Tallo10 collection last week  Blinatumomab Block 2 5/19/2025     Past Medical History:    1) Previously Healthy  2) 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  3) Hyperleukocytosis  4) Hyperuricemia  5) Hyperphosphatemia  6) Right Lower Extremity Superficial Thrombus  7) Subsegmental Pulmonary Embolism  8) 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  9) Bacteroides fragilis soft tissue infection left shoulder  10) Anxiety/Depression  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  Both maternal and paternal family history of diabetes.     Immunizations:  UTD     Social History:   Lives with girlfriend.  Graduated from college.  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  Social situation with family is fractured.    Medications:   No current facility-administered medications on file prior to encounter.     Current Outpatient Medications on File Prior to Encounter   Medication Sig Dispense Refill    levoFLOXacin (LEVAQUIN) 500 MG tablet Take 1 Tablet by mouth every 24 hours. 30 Tablet 0    dasatinib (SPRYCEL) 70 MG tablet Take 1 Tablet by mouth 2 times a day. 60 Tablet 4    sulfamethoxazole-trimethoprim (BACTRIM DS) 800-160 MG tablet  "Take 1 Tablet by mouth 2 times a day. 16 Tablet 3    calcium carbonate (TUMS EX) 750 MG chewable tablet Chew 2 Tablets 3 times a day with meals. 30 Tablet 2    vitamin D (CHOLECALCIFEROL) 1000 UNIT Tab Take 1 Tablet by mouth every day. (Patient not taking: Reported on 5/19/2025) 60 Tablet 2    LORazepam (ATIVAN) 1 MG Tab Take 1 Tablet by mouth at bedtime as needed (Nausea and Vomiting) for up to 180 days. 30 Tablet 5    senna-docusate (SENNA-S) 8.6-50 MG Tab Take 1 Tablet by mouth every day. 30 Tablet 0    ondansetron (ZOFRAN ODT) 4 MG TABLET DISPERSIBLE Take 2 Tablets by mouth every 6 hours as needed for Nausea/Vomiting. 20 Tablet 3     OBJECTIVE:     Vitals:   /81   Pulse 71   Temp 36.5 °C (97.7 °F) (Oral)   Resp 16   Ht 1.65 m (5' 4.96\")   Wt 66.8 kg (147 lb 4.3 oz)   SpO2 94%   BMI 24.54 kg/m²     Labs:    CBC, CMP on admission    Physical Exam:    Constitutional: Well-developed, well-nourished, and in no distress.  Very well-appearing.  HENT: Normocephalic and atraumatic. No nasal congestion or rhinorrhea. Oropharynx is clear and moist. No oral ulcerations or sores.    Eyes: Conjunctivae are normal. Pupils are equal, round.  EOMI.  Nonicteric.  Glasses.  Neck: Normal range of motion of neck, no adenopathy.    Cardiovascular: Normal rate, regular rhythm and normal heart sounds.  No murmur heard. DP/radial pulses 2+, cap refill < 2 sec.  Pulmonary/Chest: Effort normal and breath sounds normal. No respiratory distress. Symmetric expansion.  No crackles or wheezes.  Abdomen: Soft. Bowel sounds are normal. No distension and no mass. There is no hepatosplenomegaly.    Genitourinary:  Deferred.  Musculoskeletal: Normal range of motion of lower and upper extremities bilaterally. No tenderness to palpation of elbows, wrists, hands, knees, ankles and feet bilaterally.   No longer tender, 1 cm firm palpable nodule in the right medial thigh..  Lymphadenopathy: No cervical adenopathy.  Neurological: Alert and " oriented to person and place. Exhibits normal muscle tone bilaterally in upper and lower extremities. Gait normal. Coordination normal.    Skin: Skin is warm, dry and pink.  No rash or evidence of skin infection.  No pallor.   Psychiatric: Mood and affect normal for age.    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 Likely plan for HSCT, search for MUD ongoing. After discussing multiple options, they decided on a 3-Drug Re-Induction (VCR/PRED/PEG-ASP) +Imatinib followed by blinatumomab. 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 Met virtually with Dr. Adrian, [facility] 3/20/2025     - Has had  discussions with both bone marrow transplant as well as some therapeutics (CAR-T) at [facility].  Complete remission after re-induction, making CAR-T ineligible  - End of Reinduction evaluations demonstrating phenotypically similar aberrant lymphoblast population of 0.005% (however lacking CD19 or CD22)   - Repeat BM at [facility] confirming remission    - Given CR2, will pursue transplantation     2) Individualized Bridging Therapy with Blinatumomab Block to, Day 1:  ** Dexamethasone prior to starting blinatumomab, 12 mg PO x 1  ** Blinatumomab 28 mCg per day IV continuous infusion for 28 days, to start on 5/20/2025.  Will arrange for 24 and or 48-hour bags for the first 3 days and then provide 7-day bag in [facility] to follow.   **Plan to discharge patient to [facility] 5/23/2025 for 7-day bag of blinatumomab  ** Monitor for CRS, monitor for neurologic side effects while inpatient (tremors, seizures)      ** Continue Dasatinib 70 mg PO BID inpatient with patient's home supply, started 3/22/2025     3) Pulmonary Aspergillus:   - Pretransplant CT chest demonstrating lesion in the right minor fissure concerning for infection  - BAL at [facility] demonstrating pulmonary aspergillus  - Started on voriconazole 300 mg PO Q12 hours -will continue as inpatient  - Obtain voriconazole level a 2100 this evening prior to evening dose of voriconazole     4) Pulmonary CMV Infection:   - BAL at [facility] also demonstrating pulmonary and CMV   - Started on valganciclovir 450 mg daily    5) Complete Blood Count:  - CBC PENDING  - Transfuse irradiated PRBC for Hgb<7 g/dL or symptomatic.   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 No clinical lesions  - Chlorhexidine mouthwash  4 times daily  - Levofloxacin 500 mg PO daily  - Valganciclovir as above  - Voriconazole as above    - Seen by Dr. Carranza today - no evidence of retinitis     7) History of Rothia mucilaginosa Bacteremia:     8) FEN/GI:  - Regular diet     9) Nodule, Right Medial Thigh: (IMPROVED)      10) At Risk For Nausea and Vomiting Secondary To Therapy             - Zofran changed to PRN              - Scopolamine patch every 3 days             - Ativan IV PRN for breakthrough N/V available     11) Transaminitis Secondary To Therapy: (RESOLVED)  - CMP PENDING     12) At Risk For Hypovitaminosis D             - Continue Vitamin D3 supplement daily     13) Central Access:  - Double-lumen CVL placed 2/28/2025 by surgery  - Saline flush per protocol     14) Psychosocial:  - Dr. Ortega following     15) Social:              - Referred to Social Work and financial navigator     Disposition: Admit for three days of blinatumomab. Starting Blinatumumab 5/20/2025.     Interval History:    Tolerating blina infusion well. Anticipate DC 5/23/2025      Time spent 20 minutes more than half of which is related to B ALL.

## 2025-05-22 NOTE — CARE PLAN
The patient is Stable - Low risk of patient condition declining or worsening    Shift Goals  Clinical Goals: Monitor Labs, tolerate chemo,  Patient Goals: Rest    Progress made toward(s) clinical / shift goals:        Problem: Knowledge Deficit - Standard  Goal: Patient and family/care givers will demonstrate understanding of plan of care, disease process/condition, diagnostic tests and medications  Description: Target End Date:  1-3 days or as soon as patient condition allowsDocument in Patient Education1.  Patient and family/caregiver oriented to unit, equipment, visitation policy and means for communicating concern2.  Complete/review Learning Assessment3.  Assess knowledge level of disease process/condition, treatment plan, diagnostic tests and medications4.  Explain disease process/condition, treatment plan, diagnostic tests and medications  Outcome: Progressing  Note: Patient understands the need for Q4 neuro checks and tolerating chemo.

## 2025-05-22 NOTE — CARE PLAN
The patient is Watcher - Medium risk of patient condition declining or worsening    Shift Goals  Clinical Goals: monitor labs, q shift crs  Patient Goals: rest    Progress made toward(s) clinical / shift goals:    Problem: Knowledge Deficit - Standard  Goal: Patient and family/care givers will demonstrate understanding of plan of care, disease process/condition, diagnostic tests and medications  Outcome: Progressing     Problem: Safety  Goal: Will remain free from injury  Outcome: Progressing  Goal: Will remain free from falls  Outcome: Progressing     Problem: Infection  Goal: Will remain free from infection  Outcome: Progressing       Patient is not progressing towards the following goals:

## 2025-05-23 ENCOUNTER — OUTPATIENT INFUSION SERVICES (OUTPATIENT)
Dept: ONCOLOGY | Facility: MEDICAL CENTER | Age: 25
End: 2025-05-23
Attending: PEDIATRICS
Payer: COMMERCIAL

## 2025-05-23 VITALS
BODY MASS INDEX: 24.54 KG/M2 | HEIGHT: 65 IN | OXYGEN SATURATION: 98 % | TEMPERATURE: 97.3 F | DIASTOLIC BLOOD PRESSURE: 78 MMHG | RESPIRATION RATE: 16 BRPM | WEIGHT: 147.27 LBS | SYSTOLIC BLOOD PRESSURE: 118 MMHG | HEART RATE: 85 BPM

## 2025-05-23 VITALS
TEMPERATURE: 97 F | DIASTOLIC BLOOD PRESSURE: 76 MMHG | BODY MASS INDEX: 24.65 KG/M2 | OXYGEN SATURATION: 97 % | HEIGHT: 65 IN | WEIGHT: 147.93 LBS | HEART RATE: 92 BPM | RESPIRATION RATE: 18 BRPM | SYSTOLIC BLOOD PRESSURE: 118 MMHG

## 2025-05-23 DIAGNOSIS — C91.Z0 B LYMPHOBLASTIC LEUKEMIA WITH T(9;22)(Q34;Q11.2);BCR-ABL1 (HCC): Primary | ICD-10-CM

## 2025-05-23 PROCEDURE — 99238 HOSP IP/OBS DSCHRG MGMT 30/<: CPT | Performed by: PEDIATRICS

## 2025-05-23 PROCEDURE — 700111 HCHG RX REV CODE 636 W/ 250 OVERRIDE (IP): Mod: JZ | Performed by: PEDIATRICS

## 2025-05-23 PROCEDURE — A9270 NON-COVERED ITEM OR SERVICE: HCPCS | Performed by: PEDIATRICS

## 2025-05-23 PROCEDURE — 700101 HCHG RX REV CODE 250: Mod: UD | Performed by: PEDIATRICS

## 2025-05-23 PROCEDURE — 700102 HCHG RX REV CODE 250 W/ 637 OVERRIDE(OP): Performed by: PEDIATRICS

## 2025-05-23 PROCEDURE — G0498 CHEMO EXTEND IV INFUS W/PUMP: HCPCS

## 2025-05-23 PROCEDURE — 700111 HCHG RX REV CODE 636 W/ 250 OVERRIDE (IP): Mod: UD | Performed by: PEDIATRICS

## 2025-05-23 RX ORDER — AMOXICILLIN 250 MG
1 CAPSULE ORAL DAILY
Qty: 30 TABLET | Refills: 0 | Status: SHIPPED | OUTPATIENT
Start: 2025-05-24

## 2025-05-23 RX ORDER — DASATINIB 70 MG/1
70 TABLET, FILM COATED ORAL 2 TIMES DAILY
Qty: 30 TABLET | Refills: 0 | Status: SHIPPED | OUTPATIENT
Start: 2025-05-23

## 2025-05-23 RX ADMIN — DASATINIB 70 MG: 70 TABLET ORAL at 06:03

## 2025-05-23 RX ADMIN — SODIUM CHLORIDE 196 MCG: 9 INJECTION INTRAMUSCULAR; INTRAVENOUS; SUBCUTANEOUS at 16:58

## 2025-05-23 RX ADMIN — ONDANSETRON 8 MG: 2 INJECTION INTRAMUSCULAR; INTRAVENOUS at 12:13

## 2025-05-23 RX ADMIN — ANTACID TABLETS 1500 MG: 500 TABLET, CHEWABLE ORAL at 12:13

## 2025-05-23 RX ADMIN — VORICONAZOLE 300 MG: 200 TABLET ORAL at 06:02

## 2025-05-23 RX ADMIN — LEVOFLOXACIN 500 MG: 500 TABLET, FILM COATED ORAL at 06:02

## 2025-05-23 RX ADMIN — VALGANCICLOVIR 450 MG: 450 TABLET, FILM COATED ORAL at 06:03

## 2025-05-23 RX ADMIN — HEPARIN 300 UNITS: 100 SYRINGE at 17:13

## 2025-05-23 RX ADMIN — Medication 1 APPLICATOR: at 06:03

## 2025-05-23 RX ADMIN — DOCUSATE SODIUM 50 MG AND SENNOSIDES 8.6 MG 1 TABLET: 8.6; 5 TABLET, FILM COATED ORAL at 06:03

## 2025-05-23 ASSESSMENT — FIBROSIS 4 INDEX: FIB4 SCORE: 1.29

## 2025-05-23 NOTE — CARE PLAN
Problem: Knowledge Deficit - Standard  Goal: Patient and family/care givers will demonstrate understanding of plan of care, disease process/condition, diagnostic tests and medications  Description: Target End Date:  1-3 days or as soon as patient condition allowsDocument in Patient Education1.  Patient and family/caregiver oriented to unit, equipment, visitation policy and means for communicating concern2.  Complete/review Learning Assessment3.  Assess knowledge level of disease process/condition, treatment plan, diagnostic tests and medications4.  Explain disease process/condition, treatment plan, diagnostic tests and medications  Outcome: Met     Problem: Safety  Goal: Will remain free from injury  Outcome: Met  Goal: Will remain free from falls  Outcome: Met     Problem: Infection  Goal: Will remain free from infection  Outcome: Met   The patient is Watcher - Medium risk of patient condition declining or worsening    Shift Goals  Clinical Goals: Monitor Labs, tolerate chemo,  Patient Goals: Rest    Progress made toward(s) clinical / shift goals:      Patient is not progressing towards the following goals:

## 2025-05-23 NOTE — PROGRESS NOTES
Chemotherapy Verification - SECONDARY RN       Height = 1.65m  Weight = 67.1kg  BSA = 1.75m2       Medication: blinatumomab in CADD o[place] over 7 days  Dose: 28mcg/ day for 7 days  Calculated Dose: 196mcg over 7 days                             (In mg/m2, AUC, mg/kg)       I confirm that this process was performed independently.

## 2025-05-23 NOTE — PROGRESS NOTES
"Pharmacy Chemotherapy Verification Note:    Dx: relapsed B-ALL (ph+)        Protocol: Blincyto + TKI Consolidation     Blinatumomab (Blincyto) 28 mcg IV continuous infusion over 24 hrs daily on Days 1-28  Dasatinib 140 mg PO daily on Days 1-42 (or ponatinib 15 mg PO daily on Days 1-42)   - on Dasatinib 70 mg BID since induction (POM)  42-day cycle until transplant, disease progression, or unacceptable toxicity  NCCN Guidelines for ALL. V.2.2024.  Meg DUMAS et al. NEJM. 2020;383(17):1613-23.  Richard SANDERS et al. Lancet Haematol. 2023;10(1):e24-e34.    CNS ppx:  Methotrexate 12 - 15 mg intrathecal or intraventricular with or without hydrocortisone 50 mg  NCCN Guidelines® for Acute Lymphoblastic Leukemia V.1.2023.  Valarie DUMAS et al. [facility]. 2017;41(n01):105-129.b  Richard HANSEN , et al. [facility]te Clin Proc. 2005;80(11):1517-27.c  Vinicio  . Hematology Am Soc Hematol Educ Program. 2006:142-6.c    Allergies:  Amoxicillin     /76   Pulse 92   Temp 36.1 °C (97 °F) (Temporal)   Resp 18   Ht 1.65 m (5' 4.96\")   Wt 67.1 kg (147 lb 14.9 oz)   SpO2 97%   BMI 24.65 kg/m²  Body surface area is 1.75 meters squared.    Labs from 5/20/25 reviewed - all within treatment parameters.     Drug Order   (Drug name, dose, route, IV Fluid & volume, frequency, number of doses) Cycle: 2 Days 4-9     Previous treatment: C1 - last bag charted on 5/6/25, stopped early per transplant team, then transplant delayed for pulmonary infectious workup.      Medication = blinatumomab  Base Dose = 28 mcg/day  Calc Dose: Base Dose x 7 day = 196 mg  Final Dose = 196 mg  Route = CIVI  Fluid & Volume = .8 mL (+ overfill)  Admin Duration = Over 168 hours via CADD pump @ 0.6 mL/hr   Days 1-28  Bag size may change to accommodate up to 7 day infusion       <10% difference, okay to treat with final dose     Medication = IT methotrexate  Base Dose = 15 mg fixed dose  Calc Dose: n/a  Final Dose = 15 mg  Route = intraTHECAL  Fluid & Volume = NS 6 " mL  Admin Duration = to be administered by MD RIVERA given 5/21/25 per Dr Faye        <10% difference, okay to treat with final dose     By my signature below, I confirm this process was performed independently with the BSA and all final chemotherapy dosing calculations congruent. I have reviewed the above chemotherapy order and that my calculation of the final dose and BSA (when applicable) corroborate those calculations of the  pharmacist.     Luisana Carrero, PharmD

## 2025-05-23 NOTE — DISCHARGE SUMMARY
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 clinically. No dose adjustments were necessary however his counts had started to drop at that point with WBC 0.9 and ANC dipping to 590.  On 7/27/2023, present Tomas Roy to clinic with nausea, vomiting, abdominal discomfort as well as decreased fatigue.  Blood counts obtained at the time demonstrated a WBC of 0.4, Hgb 8.8 and platelets 125.  ANC at the time was measured at only 170.  JULY was otherwise clinically well appearing.  He did receive a one-time normal saline bolus for his nausea and vomiting and was sent home with instructions to HOLD his oral mercaptopurine and oral methotrexate which began being held on 7/28/2023.  Per protocol, imatinib was continued at previous dose of 600 mg in the morning. Tomas Roy would return to clinic again on 8/2/2023 and 8/7/2023.  On both occasions, ANC remained under 500 and oral therapy (with the exception of imatinib) continue to  be held while awaiting count recovery.  Ultimately, on 8/11/2023 after 14 days of therapy being held, Tomas Roy returned to clinic and WBC had increased to 2.1 with ANC increasing to 1500 with an absolute monocyte count of 290. Tomas Roy was then instructed to resume his oral chemotherapy at 100% of prior dosing with (due to timing with Day 1 of Cycle 2 with LP 3 days later, no weekly MTX was administered).  Imatinib was never held.  On 8/14/2023 he was seen in clinic for Day 1 of Cycle 2 of Maintenance with lumbar puncture, vincristine, and 5-day pulse of steroids.  At the time, his ANC was 2010 and his oral chemotherapy was continued at 100% dosing.  Given his history of previously drop in counts, he was scheduled to come back for repeat labs on 8/29/2023 at which point his WBC count was 1.4 and his ANC remained greater than 500 at 1140.  Again, his therapy was continued with imatinib 550 mg by mouth in the morning as well as 100% dosing of methotrexate and 100% dosing of 6-mercaptopurine.  When Tomas Roy returned to clinic on 9/11/2023 for his Maintenance, Cycle 2, Day 29 therapy he was noted to have had another drop in his WBC to 600 and his ANC accordingly was also decreased at 410.  He did receive vincristine in accordance with protocol as well as starting a 5-day pulse of prednisone per protocol.  Given however that his ANC had dropped below 500 his oral methotrexate and oral 6-MP were again held.  He was instructed return to clinic 1 week later for lab evaluations.  On 9/19/2023 he returned to clinic and WBC had improved slightly to 0.8 however ANC remained low at 260 with an absolute monocyte count of 220.  Given that counts not recovered his oral chemotherapy remained held for an additional week.  On 9/26/2023 at 14 days after initially holding chemotherapy, he was seen back in clinic at which time WBC improved again to 1.1 however ANC did not quite recover and remained at 490 with an  absolute monocyte count of 330.  Given that 2 weeks had elapsed with myelosuppression, imatinib was held in addition to oral 6-MP and methotrexate. Tomas Roy was instructed to return to clinic on 9/29/2023 for repeat counts.  On 9/29/2023 WBC had improved to 2.4 and ANC had finally recovered with 1530 and an absolute monocyte count of 510.  Given a recovery with ANC greater than 750 and platelets greater than 75, oral chemotherapy was restarted at 50% of initial dosing and imatinib was restarted at 100% of initial dosing.  On 10/9/2023, Tomas Roy returned to clinic for his Day 57 of Cycle 2 visit.  At the time of his visit, his ANC had recovered after holding chemotherapy and then restarting at 50% dosing 11 days prior.  He did however in clinic spiked a temperature 102.5 °F.  For this reason despite his ANC being improved, no dose adjustments were made in his chemotherapy.  He continued with 50% dosing with 100% adherence of his 6-MP and methotrexate as well as his imatinib at 550 mg PO QHS.   Tomas Roy was then seen in clinic again on 11/6/2023 for his Day 1 of Cycle 3 of Maintenance therapy.  At the time, his imatinib dose was adjusted back up to 600 mg daily taken in the morning.  Additionally, his methotrexate was adjusted back up to 75% of expected dosing and his mercaptopurine was increased to 75% of expected dosing as well.  He will return to clinic on 12/4/2023 for his Day 29 of Cycle 3 therapy.  At the time, his ANC was exactly 1500 and therefore no dose adjustments were made and he continued at 75% dosing for oral chemotherapy as well as the imatinib dose of 600 mg daily.  On 1/2/2024 he was seen for his Day 57 evaluations and his ANC had dropped to 1450 again not prompting any change in oral chemotherapy dosing.  Tomas Roy completed his Cycle 4 chemotherapy without any adverse events or complications.  He did not have any changes in medications either.  Most recently,  Tomas  "Kirill was seen in clinic on 4/22/2023 for his Day 1 of Cycle 5 chemotherapy.  At the time, a lumbar puncture was performed and IT chemotherapy was provided.  He tolerated the procedure and the therapy well without any sequelae.  His ANC at the time was > 1500 however the dose adjustment was made as this was the first ANC greater than 1500 and Tomas Roy had a history of precipitous drops in his counts with increases in his oral chemotherapy.  On 5/20/2024, Tomas Roy presented to clinic for his Cycle 5, Day 29 therapy and evaluations. At that time, he was started on 5 day pulse of prednisone and his oral methotrexate dose was increased to 13 tablets PO weekly (90.78% of expected dosing). His port was removed on 5/20/2024 by Dr. Moise. He completed therapy on 5/30/2024.  Since his completion of therapy and poor removal, he has been seen in follow-up and was most recently seen on 1/15/2025 at which point there was no evidence of relapse or recurrence both clinically or in his labs.  Interval history however is remarkable for upper respiratory symptoms approximately 1-1/2 weeks ago.  At the time, he reported having some nausea and vomiting as well as an upset stomach and sore throat.  The symptoms were thought most likely to be viral and in fact improved consistent with viral illness.  On about 2/24/2025 however symptoms returned and were more flulike in nature with aches and pains and low-grade temperatures.  On the evening of 2/26/2025, JULY called Pediatric Hematology/Oncology to report that he \"feels like I am relapsing\". Tomas Roy was brought in to clinic today for evaluations.  In clinic, CBC, CMP, respiratory viral panel and EBV studies were obtained.  Respiratory viral studies were negative.  CBC however demonstrated a white blood cell count of 1, hemoglobin 10.6, platelets of 53 without any circulating blasts.  His CMP for the most part was normal with mild hyponatremia.  Uric acid was 7.2 " and an LDH of 244.  Given these lab values as well as a temperature of 100.6 while in clinic also in the context of an acute infection of his right fourth phalanx, decision was made to bring JULY back to the hospital for admission for fever and neutropenia as well as workup of presumed relapsed disease.  He was admitted on 2/27/2025.  Workup was remarkable for pancytopenia.  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  In addition to his diagnostic bone marrow and diagnostic lumbar puncture which was negative for disease (CNS 1) he also had a 7 Australian double-lumen Broviac placed on the same day.  He was started on high-dose prednisone on his admission.  After results confirmed relapsed disease, his case was discussed with [facility] and it was decided that he should receive a 3 drug reinduction.  As such, lumbar puncture with intrathecal chemotherapy was administered on 3/6/2025 and he was given his Day 1 vincristine.  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  On 3/22/2025, Tomas Roy reported significant perirectal pain, especially with stooling and also reported bloody and mucus filled stools at home.  For this reason in addition to not feeling well he was brought in for admission.  On admission he was noted to be neutropenic and also developed fever prompting his stay for fever and neutropenia.  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   Tomas Roy had his End of Reinduction evaluations on 4/4/2025.  Bone marrow at the time demonstrated a minuscule population of atypical lymphocytes initially thought to be consistent with residual disease however below the level of detection.  CAR-T workup had been performed and  there was a plan to enroll on a dual CAR protocol at [facility].  Ultimately, Tomas Roy was discharged from the hospital on 4/9/2025 with plans to follow-up at [facility] on 4/14/2025.  Upon his presentation to [facility], there was concern however that his disease status was in complete remission and/or he had lost CD19 and CD22 expression.  For this reason, rather than being enrolled on study, repeat bone marrow was obtained at [facility].  Repeat bone marrow did not demonstrate any residual disease consistent with complete remission.  BCR-ABL RT-PCR was also consistent with these findings.  Given complete remission, Tomas Roy was no longer considered a candidate for CAR-T and he was discharged back home to [place] to receive consolidating immune therapy prior to transplantation at [facility].  On 4/24/2025 he was admitted for Blinatumomab Block 1 therapy.  His hospitalization was complicated by fever, clinical sepsis without identification as well as body aches and pains.  His clinical sepsis was thought to be due to adrenal insufficiency and he was started on Solu-Cortef and ultimately placed on wean before his discharge on 4/30/2025..  He was ultimately discharged on 4/30/2025.  Interval history was remarkable for completion of his transplant workup and travel to [facility] for proposed transplant.  At [facility], follow-up of abnormal findings on CT of the chest with a BAL demonstrated both CMV as well as Aspergillus.  For this reason, he was started on valganciclovir as well as voriconazole.  Given these new findings, his transplant was placed on hold temporarily.  He was collected however for his Tallo10 study before being sent back to [place] for another month of blinatumomab and Dasatinib therapy.  Today, he presents for admission for another bridging block of Blinatumomab Block 2.       On presentation, Tomas Roy reports that clinically he is exceptionally well.  He has not had any  recent or remote illness.  Did call about a presumptive fever the other night in the context of feeling exceptionally well.  Upon reevaluation it was noted that his thermometer was broken.  No complaints of any headaches, changes in vision or neurologic status changes. Tomas Roy was seen in clinic with Dr. Carranza this morning and per report in the medical record, no retinal evidence of CMV or Aspergillus.  There are no complaints of any shortness of breath, cough or difficulty breathing.  No nausea, vomiting, diarrhea or constipation.  No bleeding per rectum.  No complaints of any skin changes or rashes.  No aches or pains.  Per Tomas Roy, taking voriconazole, valganciclovir, Dasatinib and weekend Bactrim with 100% adherence.  No other concerns or complaints at this time.     Review of Systems:      Constitutional: Afebrile.  No recent or remote illness.  Energy and activity are at baseline.  HENT: Negative for auditory changes, nosebleeds and sore throat.  No mouth sores.  Eyes: Negative for visual changes.  Respiratory: Negative for  shortness of breath and stridor.   Cardiovascular: Negative for chest pain and leg swelling.    Gastrointestinal: Negative for nausea, vomiting, abdominal pain, diarrhea, constipation and blood in stool.    Genitourinary: Negative for dysuria and flank pain.    Musculoskeletal: Positive for chronic filgrastim induced bone pain.    Skin: Negative for rash, signs of infection.  Neurological: Negative for numbness, tingling, sensory changes, weakness or headaches.    Endo/Heme/Allergies: Does not bruise/bleed easily.    Psychiatric/Behavioral: No changes in mood, appropriate for age.      PAST MEDICAL HISTORY:      Oncologic History:  2-3 week history of worsening fatigue, right lower extremity pain  Presentation to OSH and diagnosed with right LE superficial thrombus, subsegmental PE and hyperleukocytosis, anemia and thrombocytopenia  Transferred to [facility]  definitive care  Presenting (local) WBC > 440K, Hgb 10.0, platelets 53, (automated differential ANC 3190, ALC 75,310, absolute monocyte count 43343, absolute blast count 340,560)  Uric Acid 15.6, phosphorus 5.6, LDH 1114  Rasburicase x 1 dose given   Peripheral Blood flow cytometry demonstrating CD10 pos, CD19 pos, CD20 neg, CD22 dim (60%) 5/28/2022  Peripheral blood FISH for BCR-ABL1 positive in 98% of analyzed cells     Age at Diagnosis: 21 years  White Blood Cell Count at Presentation: > 440 k/uL  Testicular Disease Status: Negative (see procedure note 5/30/2022)  CNS Status: 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, diagnostic LP with WBC 1, RBC 3 and no evidence of leukemic blasts 5/30/2022  Steroid Pre-treatment: None  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     All inclusion/exclusion criteria for [id number] met and consent signed - enrolled 5/29/2022   All inclusion/exclusion criteria for [id number] met and consent signed - enrolled 5/30/2022  Confirmatory bone marrow aspirate and biopsy and diagnostic LP + cytarabine 70 mg IT 5/30/2022  Induction therapy (ON STUDY [id number]) started 5/30/2022  Bone marrow immunohistochemistry consistent with diagnosis of B-ALL comprising 90% of marrow cellularity  Bone marrow sample sent to [facility] for COG purposes:  Flow cytom  Of the blood pressure little high that is a problem is a cultural problem is well and cultural genetic and everything else like that unfortunately breathalyzers such bad heart disease diabetes things like that  populations etry consistent with peripheral blood, cytogenetics remarkable for known t(9;22)  CSF with WBC 1, RBC 3, no blasts identified on cytospin  FISH results available 5/31/2022 making patient eligible for transfer from [place] [address] to [place] [address] as eligibility requirements were met for [place] [address]  Patient unenrolled from [place] [address] (BCR-ABL1 fusion positive)  6/1/2022  Consent signed for [place] [address] and patient enrolled 6/1/2022 (see eligibility checklist from 5/31/2022 and consent note from 6/1/2022)  Imatinib 400 mg PO QAM / 200 mg PO QPM started 6/3/2022 (allowed per [place] [address])  Patient completed the first 15 days of a Standard 4-drug Induction on 6/13/2022  Start of [facility]1631(OS), Induction IA Part 2, Day 15 6/13/2022  End of Induction 1A Part 2 - MRD negative  Start of [facility]1631(OS), Induction IA Part 2, Day 15 7/5/2022  Induction IB DELAYED 2 weeks 14 days from 7/26/2022-8/9/2022) for myelosuppression - Start of Day 22 cytarabine block 8/9/2022  Induction IB Day 42 delayed 9 days for myelosuppression - Day 42 evaluations 9/7/2022  End of Induction IB - Flow cytometric MRD negative, MRD by IgH-TCR PCR [id number]%  Randomization to AR-Experimental Arm B of [id number]  Start of AR-Experimental Arm B, Interim Maintenance 9/29/2022  Weight based increase in dose of imatinib to 400 mg PO AM and 250 mg PM 9/29/2022  Thrombocytopenia not permissive of proceeding with Day 15 of Interim Maintenance  AR-Experimental Arm B, Interim Maintenance, Day 15 delayed 4 days, start 10/17/2022  AR-Experimental Arm B, Interim Maintenance, Day 29, start 11/1/2022  AR-Experimental Arm B, Interim Maintenance, Day 43, start 11/14/2022  Last does of 6-MP 11/27/2022  AR-Experimental Arm B, Delayed Intensification, Day 1, start 12/6/2022  Admission with Severe Septic Shock 12/27/2022  Imatinib HELD 12/27/2022-1/8/2023  AR-Experimental Arm B, Delayed Intensification, Day 29 DELAYED 14 days with start 1/17/2023  Hospitalization 2/7/2023 on Day 50 of Delayed Intensification with left shoulder pain ultimately diagnosed with Bacteroides fragilis infection  AR-Experimental Arm B, Interim Maintenance, Day 1 DELAYED 7 days with start 2/27/2023  AR-Experimental Arm B, Interim Maintenance, Day 11 DELAYED 4 days for platelets 43K on 3/9/2023  AR-Experimental Arm B, Interim Maintenance, Day 11 VCR given  "and MTX omitted for platelets 43K 3/13/2023  Imatinib HELD 3/30/2023-4/11/2023  AR-Experimental Arm B, Interim Maintenance, Day 21 (DELAYED 22 DAYS) - administered 4/11/2023 with methotrexate 80 mg/m² IV  AR-Experimental Arm B, Interim Maintenance, Day 31 (\"true\" Day 31 4/25/2023) - VCR and IT MTX given 4/28/2023 - IV MTX omitted  AR-Experimental Arm B, Interim Maintenance, Day 41 met with counts - administered 5/5/2023 with methotrexate 80 mg/m² IV     Start of Maintenance, Cycle 1, Day 1 -5/22/2023  Cranial radiation 6/5/2023-6/16/2023 (10 fractions)  Maintenance, Cycle 1, Day 29 - 6/23/2023 (no IT MTX given)  Maintenance, Cycle 1, Day 67, presented with fatigue nausea and vomiting found to have ANC less than 500  Methotrexate (oral) and mercaptopurine held 7/27/2023 - continued with imatinib  Methotrexate (oral) and mercaptopurine held 8/2/2023 - continued with imatinib  Methotrexate (oral) and mercaptopurine held 8/7/2023 - continued with imatinib  Restarted methotrexate (oral) and mercaptopurine at 100% previous dosing on 8/11/2023 - continued with imatinib  Maintenance, Cycle 2, Day 1 - 8/14/2023  Maintenance, Cycle 2, Day 29 - 9/11/2023  Methotrexate (oral) and mercaptopurine held 9/11/2023 - continued with imatinib  Methotrexate (oral) and mercaptopurine held 9/19/2023 - continued with imatinib  Methotrexate (oral) and mercaptopurine held 9/26/2023 - HELD imatinib  Methotrexate (oral) restarted at 50% dosing and mercaptopurine restarted at 50% dosing 9/29/2023 - RESTARTED imatinib  Maintenance, Cycle 2, Day 57 - 10/9/2023  Maintenance, Cycle 3, Day 1 - 11/6/2023: Methotrexate (oral) increased to 75% dosing and mercaptopurine increased to 75% dosing.  Imatinib increased to 600 mg QAM 11/6/2023  Maintenance Cycle 3, Day 29: 12/4/2023 No changes made to the dosing of oral chemotherapy  Maintenance, Cycle 4, Day 1: No dose adjustments made however ANC slightly greater than >1500.  Oral chemotherapy did not need " weight adjustment.  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     Imatinib discontinued, dasatanib started 3/22/2025     End of Re-Induction 4/4/2025 demonstrating suspicious population of phenotypically atypical cells at 0.005%  BCR-ABL RT-PCR 4/4/2025 detectable at 0.213228%     Repeat bone marrow evaluation at [facility] 4/15/2025 MRD negative  BCR-ABL RT-PCR 4/15/2025 undetectable     Blinatumomab Block 1 4/24/2025  Tallo10 collection last week  Blinatumomab Block 2 5/19/2025     Past Medical History:    1) Previously Healthy  2) 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  3) Hyperleukocytosis  4) Hyperuricemia  5) Hyperphosphatemia  6) Right Lower Extremity Superficial Thrombus  7) Subsegmental Pulmonary Embolism  8) 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  9) Bacteroides fragilis soft tissue infection left shoulder  10) Anxiety/Depression  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  Both maternal and paternal family history of diabetes.     Immunizations:  UTD     Social History:   Lives with girlfriend.  Graduated from college.  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  Social situation with family is fractured.     Medications:   Medications Ordered Prior to Encounter   No current facility-administered medications  "on file prior to encounter.             Current Outpatient Medications on File Prior to Encounter   Medication Sig Dispense Refill    levoFLOXacin (LEVAQUIN) 500 MG tablet Take 1 Tablet by mouth every 24 hours. 30 Tablet 0    dasatinib (SPRYCEL) 70 MG tablet Take 1 Tablet by mouth 2 times a day. 60 Tablet 4    sulfamethoxazole-trimethoprim (BACTRIM DS) 800-160 MG tablet Take 1 Tablet by mouth 2 times a day. 16 Tablet 3    calcium carbonate (TUMS EX) 750 MG chewable tablet Chew 2 Tablets 3 times a day with meals. 30 Tablet 2    vitamin D (CHOLECALCIFEROL) 1000 UNIT Tab Take 1 Tablet by mouth every day. (Patient not taking: Reported on 5/19/2025) 60 Tablet 2    LORazepam (ATIVAN) 1 MG Tab Take 1 Tablet by mouth at bedtime as needed (Nausea and Vomiting) for up to 180 days. 30 Tablet 5    senna-docusate (SENNA-S) 8.6-50 MG Tab Take 1 Tablet by mouth every day. 30 Tablet 0    ondansetron (ZOFRAN ODT) 4 MG TABLET DISPERSIBLE Take 2 Tablets by mouth every 6 hours as needed for Nausea/Vomiting. 20 Tablet 3         OBJECTIVE:      Vitals:   /81   Pulse 71   Temp 36.5 °C (97.7 °F) (Oral)   Resp 16   Ht 1.65 m (5' 4.96\")   Wt 66.8 kg (147 lb 4.3 oz)   SpO2 94%   BMI 24.54 kg/m²      Labs:     CBC, CMP on admission     Physical Exam:     Constitutional: Well-developed, well-nourished, and in no distress.  Very well-appearing.  HENT: Normocephalic and atraumatic. No nasal congestion or rhinorrhea. Oropharynx is clear and moist. No oral ulcerations or sores.    Eyes: Conjunctivae are normal. Pupils are equal, round.  EOMI.  Nonicteric.  Glasses.  Neck: Normal range of motion of neck, no adenopathy.    Cardiovascular: Normal rate, regular rhythm and normal heart sounds.  No murmur heard. DP/radial pulses 2+, cap refill < 2 sec.  Pulmonary/Chest: Effort normal and breath sounds normal. No respiratory distress. Symmetric expansion.  No crackles or wheezes.  Abdomen: Soft. Bowel sounds are normal. No distension and no " mass. There is no hepatosplenomegaly.    Genitourinary:  Deferred.  Musculoskeletal: Normal range of motion of lower and upper extremities bilaterally. No tenderness to palpation of elbows, wrists, hands, knees, ankles and feet bilaterally.   No longer tender, 1 cm firm palpable nodule in the right medial thigh..  Lymphadenopathy: No cervical adenopathy.  Neurological: Alert and oriented to person and place. Exhibits normal muscle tone bilaterally in upper and lower extremities. Gait normal. Coordination normal.    Skin: Skin is warm, dry and pink.  No rash or evidence of skin infection.  No pallor.   Psychiatric: Mood and affect normal for age.     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 Likely plan for HSCT, search for MUD ongoing. After discussing multiple options, they decided on a 3-Drug Re-Induction (VCR/PRED/PEG-ASP) +Imatinib followed by blinatumomab. 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 Met virtually with Dr. Adrian, [facility] 3/20/2025     - Has had discussions with both bone marrow transplant as well as some therapeutics (CAR-T) at [facility].  Complete remission after re-induction, making CAR-T ineligible  - End of Reinduction evaluations demonstrating phenotypically similar aberrant lymphoblast population of 0.005% (however lacking CD19 or CD22)   - Repeat BM at [facility] confirming remission     - Given CR2, will pursue transplantation     2) Individualized Bridging Therapy with Blinatumomab Block to, Day 1:  ** Dexamethasone prior to starting blinatumomab, 12 mg PO x 1  ** Blinatumomab 28 mCg per day IV continuous infusion for 28 days, to start on 5/20/2025.  Will arrange for 24 and or 48-hour bags for the first 3 days and then provide 7-day bag in [facility] to follow.   **Plan to discharge patient to [facility] 5/23/2025 for 7-day bag of blinatumomab  ** Monitor for CRS, monitor for neurologic side effects while inpatient (tremors, seizures)       ** Continue Dasatinib 70 mg PO BID inpatient with patient's home supply, started 3/22/2025      3) Pulmonary Aspergillus:   - Pretransplant CT chest demonstrating lesion in the right minor fissure concerning for infection  - BAL at [facility] demonstrating pulmonary aspergillus  - Started on voriconazole 300 mg PO Q12 hours -will continue as inpatient  - Obtain voriconazole level a 2100 this evening prior to evening dose of voriconazole      4) Pulmonary CMV Infection:              - BAL at [facility] also demonstrating pulmonary and CMV              - Started on valganciclovir 450 mg daily     5) Complete Blood  Count:  - CBC PENDING  - Transfuse irradiated PRBC for Hgb<7 g/dL or symptomatic.   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 No clinical lesions  - Chlorhexidine mouthwash 4 times daily  - Levofloxacin 500 mg PO daily  - Valganciclovir as above  - Voriconazole as above     - Seen by Dr. Carranza today - no evidence of retinitis     7) History of Rothia mucilaginosa Bacteremia:     8) FEN/GI:  - Regular diet     9) Nodule, Right Medial Thigh: (IMPROVED)        10) At Risk For Nausea and Vomiting Secondary To Therapy             - Zofran changed to PRN              - Scopolamine patch every 3 days             - Ativan IV PRN for breakthrough N/V available     11) Transaminitis Secondary To Therapy: (RESOLVED)  - CMP PENDING     12) At Risk For Hypovitaminosis D             - Continue Vitamin D3 supplement daily     13) Central Access:  - Double-lumen CVL placed 2/28/2025 by surgery  - Saline flush per protocol     14) Psychosocial:  - Dr. Ortega following     15) Social:              - Referred to Social Work and financial navigator     Disposition: Admit for three days of blinatumomab. Starting Blinatumumab 5/20/2025.      Interval History:     Tolerating blina infusion well. Anticipate DC today. Continue dasatinib at home. Return to clinic as scheduled.         Time spent  less than 30 minutes discharge planning.

## 2025-05-23 NOTE — PROGRESS NOTES
PEDIATRIC BEHAVIORAL HEALTH VISIT    ADULT REQUEST FOR CONFIDENTIALITY    Name:  Tomas Jean-Baptiste  MRN:  [id number]  :  2000  Age:  24 y.o.  Referring Provider: Dr. Faye (Hem/Onc)  Pediatrician:  Margarito Arvizu M.D.  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 See intake note dated 23. With his current relapse, psychology is meeting with JULY to process and cope with how it is impacting his life.     Mental Status Exam:   General description cooperative with interview  Interactional style Culturally appropriate  Eye contact Appropriate  Speech Unimpaired, fluid and clear, normal rate and rythem  Motor activity Average  Orientation Oriented to person time, place and situation  Intellectual functioning Unimpaired  Memory Unimpaired  Attention and concentration Intact and normative concentration  Fund of knowledge Average  Mood Euthymic   Affect Appropriate   Perceptual Disturbances None apparent  Thought Process  No abnormalities apparent       Associations Unimpaired associations       Abstractions Normal abstractions, intact       Insight Insight - adequate and normative       Judgment Judgments - intact and normative   Thought Content  No apparent delusions    Risk Assessment:  Tomas Roy did not report current concerns regarding risk to self or others.       Issues Discussed:   This provider met with JULY to continue processing the grief he is experiencing around his relapse as well as the estrangement of his mother and siblings. Today we spent the visit processing feelings around his family and choices he should be making. We  also reviewed feelings about his future and the unknown.     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 After meeting with JULY during his intake, it appears he could benefit from learning anxiety management skills, processing what he has been through, and how to live the life he wants. In treatment, Tomas Mu-ism Nabeel Kimarosa benefited from learning appropriate ways of expressing and coping with his emotions, learning Cognitive Behavioral Therapy (CBT) and Acceptance and Commitment Therapy (ACT) tools to understand the interaction of thoughts, feelings, and actions, as well as Trauma Focused-CBT to process his cancer journey. Currently, he could benefit from processing how relapse is impacting his life and relationships and how the past is influencing him.       PLAN  JULY will learn and utilize appropriate ways to express and cope with emotions.    He will learn the connection between thoughts, feelings, and actions utilizing CBT and ACT tools.,   JULY will process how their medical diagnosis is impacting their life.    This provider will plan to connect with JULY if he wants a visit before he goes for transplant.       The above diagnostic impressions, recommendations, and treatment plan were discussed with and agreed upon by Tomas Mu-ism, and his caregivers. Care will be coordinated with Tomas Roy's healthcare team, as appropriate.    Total time spent on encounter was 60minutes.    Laurie Ortega, PhD  Pediatric Psychologist   Licensed Psychologist, [place] # [id number]  [facility], Behavioral Health

## 2025-05-23 NOTE — DISCHARGE PLANNING
Case Management Discharge Planning    Admission Date: 5/19/2025  GMLOS: 3.9  ALOS: 4    6-Clicks ADL Score: 24  6-Clicks Mobility Score: 24      Anticipated Discharge Dispo: Discharge Disposition: Discharged to home/self care (01)    DME Needed: No    Action(s) Taken: LMSW met with patient bedside and obtained IMM signature. Patient is scheduled to discharge today between 1515 and 1600.    Escalations Completed: None    Medically Clear: Yes    Next Steps: Case management to follow for further discharge needs.    Barriers to Discharge: None    Is the patient up for discharge tomorrow: Yes    Is transport arranged for discharge disposition: Yes

## 2025-05-23 NOTE — CARE PLAN
The patient is Watcher - Medium risk of patient condition declining or worsening    Shift Goals  Clinical Goals: Monitor Labs, tolerate chemo,  Patient Goals: Rest    Progress made toward(s) clinical / shift goals:    Problem: Knowledge Deficit - Standard  Goal: Patient and family/care givers will demonstrate understanding of plan of care, disease process/condition, diagnostic tests and medications  Outcome: Progressing     Problem: Safety  Goal: Will remain free from injury  Outcome: Progressing  Goal: Will remain free from falls  Outcome: Progressing     Problem: Infection  Goal: Will remain free from infection  Outcome: Progressing       Patient is not progressing towards the following goals:

## 2025-05-23 NOTE — PROGRESS NOTES
Chemotherapy Verification - PRIMARY RN      Height = 1.65m  Weight = 67.1kg  BSA = 1.75m2       Medication: blinatumomab  Dose: 28mcg/day x7 days fixed dose Calculated Dose:  196 mcg over  7 days     (In mg/m2, AUC, mg/kg)           I confirm this process was performed independently with the BSA and all final chemotherapy dosing calculations congruent.  Any discrepancies of 10% or greater have been addressed with the chemotherapy pharmacist. The resolution of the discrepancy has been documented in the EPIC progress notes.

## 2025-05-23 NOTE — PROGRESS NOTES
Chemotherapy Verification - PRIMARY RN      Height = 165cm  Weight = 67.1kg  BSA = 1.75m2       Medication: Blinatumomab  Dose: 196mcg over 168 hours(7 days) / 28mcg per day via CADD pump fixed dose  Calculated Dose: 196mcg                             (In mg/m2, AUC, mg/kg)       I confirm this process was performed independently with the BSA and all final chemotherapy dosing calculations congruent.  Any discrepancies of 10% or greater have been addressed with the chemotherapy pharmacist. The resolution of the discrepancy has been documented in the EPIC progress notes.

## 2025-05-24 NOTE — PROGRESS NOTES
Pt presented to [facility] for Day 4 Cycle 2 of blinatumomab CADD pump exchange. Pt arrived immediately upon d/c from inpatient stay at [facility], assessment completed, POC discussed. Pt disconnected from CADD pump, confirmed prescribed volume had been infused prior to disconnect. L chest DL Broviac accessed using aseptic technique; brisk blood return observed from each lumen; 10mL blood waste removed from lumen in use; 5mL blood waste removed from lumen not in use; each lumen flushed with NS; claves changed on each lumen; lumen not in use heparin locked and CHG cap applied; CADD pump attached to infusion lumen following 2 RN verification, pt tolerated well. Connection confirmed and pump placed in carrying case. Next appointment confirmed. Pt discharged to self care with all personal belongings and in NAD.

## 2025-05-29 ENCOUNTER — APPOINTMENT (OUTPATIENT)
Dept: ONCOLOGY | Facility: MEDICAL CENTER | Age: 25
End: 2025-05-29
Payer: COMMERCIAL

## 2025-05-29 ENCOUNTER — HOSPITAL ENCOUNTER (OUTPATIENT)
Facility: MEDICAL CENTER | Age: 25
End: 2025-05-29
Attending: PEDIATRICS
Payer: COMMERCIAL

## 2025-05-29 ENCOUNTER — HOSPITAL ENCOUNTER (OUTPATIENT)
Dept: PEDIATRIC HEMATOLOGY/ONCOLOGY | Facility: MEDICAL CENTER | Age: 25
End: 2025-05-29
Attending: PEDIATRICS
Payer: COMMERCIAL

## 2025-05-29 ENCOUNTER — APPOINTMENT (OUTPATIENT)
Dept: ADMISSIONS | Facility: MEDICAL CENTER | Age: 25
End: 2025-05-29
Attending: PEDIATRICS
Payer: COMMERCIAL

## 2025-05-29 DIAGNOSIS — Z76.82 BONE MARROW TRANSPLANT CANDIDATE: Primary | ICD-10-CM

## 2025-05-29 DIAGNOSIS — Z76.82 BONE MARROW TRANSPLANT CANDIDATE: ICD-10-CM

## 2025-05-29 DIAGNOSIS — C91.02 ACUTE LYMPHOBLASTIC LEUKEMIA (ALL) IN RELAPSE (HCC): Primary | ICD-10-CM

## 2025-05-29 LAB
ALBUMIN SERPL BCP-MCNC: 4.3 G/DL (ref 3.2–4.9)
ALBUMIN/GLOB SERPL: 1.5 G/DL
ALP SERPL-CCNC: 222 U/L (ref 30–99)
ALT SERPL-CCNC: 306 U/L (ref 2–50)
ANION GAP SERPL CALC-SCNC: 16 MMOL/L (ref 7–16)
AST SERPL-CCNC: 505 U/L (ref 12–45)
BASOPHILS # BLD AUTO: 0.4 % (ref 0–1.8)
BASOPHILS # BLD: 0.02 K/UL (ref 0–0.12)
BILIRUB SERPL-MCNC: 1.1 MG/DL (ref 0.1–1.5)
BUN SERPL-MCNC: 5 MG/DL (ref 8–22)
CALCIUM ALBUM COR SERPL-MCNC: 8.5 MG/DL (ref 8.5–10.5)
CALCIUM SERPL-MCNC: 8.7 MG/DL (ref 8.5–10.5)
CHLORIDE SERPL-SCNC: 99 MMOL/L (ref 96–112)
CO2 SERPL-SCNC: 19 MMOL/L (ref 20–33)
CREAT SERPL-MCNC: 0.57 MG/DL (ref 0.5–1.4)
EOSINOPHIL # BLD AUTO: 0.06 K/UL (ref 0–0.51)
EOSINOPHIL NFR BLD: 1.3 % (ref 0–6.9)
ERYTHROCYTE [DISTWIDTH] IN BLOOD BY AUTOMATED COUNT: 64.5 FL (ref 35.9–50)
GFR SERPLBLD CREATININE-BSD FMLA CKD-EPI: 141 ML/MIN/1.73 M 2
GLOBULIN SER CALC-MCNC: 2.9 G/DL (ref 1.9–3.5)
GLUCOSE SERPL-MCNC: 94 MG/DL (ref 65–99)
HCT VFR BLD AUTO: 37.1 % (ref 42–52)
HGB BLD-MCNC: 11.5 G/DL (ref 14–18)
IMM GRANULOCYTES # BLD AUTO: 0.06 K/UL (ref 0–0.11)
IMM GRANULOCYTES NFR BLD AUTO: 1.3 % (ref 0–0.9)
LYMPHOCYTES # BLD AUTO: 1.44 K/UL (ref 1–4.8)
LYMPHOCYTES NFR BLD: 30.3 % (ref 22–41)
MAGNESIUM SERPL-MCNC: 2.2 MG/DL (ref 1.5–2.5)
MCH RBC QN AUTO: 28.1 PG (ref 27–33)
MCHC RBC AUTO-ENTMCNC: 31 G/DL (ref 32.3–36.5)
MCV RBC AUTO: 90.7 FL (ref 81.4–97.8)
MONOCYTES # BLD AUTO: 0.12 K/UL (ref 0–0.85)
MONOCYTES NFR BLD AUTO: 2.5 % (ref 0–13.4)
NEUTROPHILS # BLD AUTO: 3.06 K/UL (ref 1.82–7.42)
NEUTROPHILS NFR BLD: 64.2 % (ref 44–72)
NRBC # BLD AUTO: 0 K/UL
NRBC BLD-RTO: 0 /100 WBC (ref 0–0.2)
PHOSPHATE SERPL-MCNC: 1.8 MG/DL (ref 2.5–4.5)
PLATELET # BLD AUTO: 175 K/UL (ref 164–446)
PMV BLD AUTO: 8.2 FL (ref 9–12.9)
POTASSIUM SERPL-SCNC: 4 MMOL/L (ref 3.6–5.5)
PROT SERPL-MCNC: 7.2 G/DL (ref 6–8.2)
RBC # BLD AUTO: 4.09 M/UL (ref 4.7–6.1)
SODIUM SERPL-SCNC: 134 MMOL/L (ref 135–145)
WBC # BLD AUTO: 4.8 K/UL (ref 4.8–10.8)

## 2025-05-29 PROCEDURE — 84100 ASSAY OF PHOSPHORUS: CPT

## 2025-05-29 PROCEDURE — 83735 ASSAY OF MAGNESIUM: CPT

## 2025-05-29 PROCEDURE — 80285 DRUG ASSAY VORICONAZOLE: CPT

## 2025-05-29 PROCEDURE — 36415 COLL VENOUS BLD VENIPUNCTURE: CPT

## 2025-05-29 PROCEDURE — 85025 COMPLETE CBC W/AUTO DIFF WBC: CPT

## 2025-05-29 PROCEDURE — 80053 COMPREHEN METABOLIC PANEL: CPT

## 2025-05-29 NOTE — PROGRESS NOTES
Pt to [facility] for lab draw. Labs drawn from the left ac without difficulty / with 1 attempt.   Pt tolerated well.      Vori last taken 5/28/25 @2100

## 2025-05-30 ENCOUNTER — PHARMACY VISIT (OUTPATIENT)
Dept: PHARMACY | Facility: MEDICAL CENTER | Age: 25
End: 2025-05-30
Payer: COMMERCIAL

## 2025-05-30 ENCOUNTER — OUTPATIENT INFUSION SERVICES (OUTPATIENT)
Dept: ONCOLOGY | Facility: MEDICAL CENTER | Age: 25
End: 2025-05-30
Attending: PEDIATRICS
Payer: COMMERCIAL

## 2025-05-30 VITALS
DIASTOLIC BLOOD PRESSURE: 85 MMHG | BODY MASS INDEX: 23.4 KG/M2 | TEMPERATURE: 97.8 F | WEIGHT: 140.43 LBS | HEART RATE: 109 BPM | SYSTOLIC BLOOD PRESSURE: 131 MMHG | HEIGHT: 65 IN | OXYGEN SATURATION: 97 % | RESPIRATION RATE: 17 BRPM

## 2025-05-30 DIAGNOSIS — T45.1X5A CHEMOTHERAPY INDUCED NAUSEA AND VOMITING: ICD-10-CM

## 2025-05-30 DIAGNOSIS — C91.01 ACUTE LYMPHOID LEUKEMIA IN REMISSION (HCC): ICD-10-CM

## 2025-05-30 DIAGNOSIS — R11.2 CHEMOTHERAPY INDUCED NAUSEA AND VOMITING: ICD-10-CM

## 2025-05-30 DIAGNOSIS — C91.Z0 B LYMPHOBLASTIC LEUKEMIA WITH T(9;22)(Q34;Q11.2);BCR-ABL1 (HCC): Primary | ICD-10-CM

## 2025-05-30 LAB
ALBUMIN SERPL BCP-MCNC: 4.2 G/DL (ref 3.2–4.9)
ALBUMIN/GLOB SERPL: 1.5 G/DL
ALP SERPL-CCNC: 223 U/L (ref 30–99)
ALT SERPL-CCNC: 294 U/L (ref 2–50)
ANION GAP SERPL CALC-SCNC: 14 MMOL/L (ref 7–16)
AST SERPL-CCNC: 476 U/L (ref 12–45)
BILIRUB SERPL-MCNC: 0.6 MG/DL (ref 0.1–1.5)
BUN SERPL-MCNC: 4 MG/DL (ref 8–22)
CALCIUM ALBUM COR SERPL-MCNC: 8 MG/DL (ref 8.5–10.5)
CALCIUM SERPL-MCNC: 8.2 MG/DL (ref 8.5–10.5)
CHLORIDE SERPL-SCNC: 99 MMOL/L (ref 96–112)
CO2 SERPL-SCNC: 20 MMOL/L (ref 20–33)
CREAT SERPL-MCNC: 0.6 MG/DL (ref 0.5–1.4)
GFR SERPLBLD CREATININE-BSD FMLA CKD-EPI: 139 ML/MIN/1.73 M 2
GLOBULIN SER CALC-MCNC: 2.8 G/DL (ref 1.9–3.5)
GLUCOSE SERPL-MCNC: 125 MG/DL (ref 65–99)
POTASSIUM SERPL-SCNC: 3.5 MMOL/L (ref 3.6–5.5)
PROT SERPL-MCNC: 7 G/DL (ref 6–8.2)
SODIUM SERPL-SCNC: 133 MMOL/L (ref 135–145)

## 2025-05-30 PROCEDURE — 96521 REFILL/MAINT PORTABLE PUMP: CPT

## 2025-05-30 PROCEDURE — 80053 COMPREHEN METABOLIC PANEL: CPT

## 2025-05-30 PROCEDURE — RXMED WILLOW AMBULATORY MEDICATION CHARGE: Performed by: PEDIATRICS

## 2025-05-30 PROCEDURE — 700101 HCHG RX REV CODE 250: Mod: UD | Performed by: PEDIATRICS

## 2025-05-30 RX ORDER — ONDANSETRON 4 MG/1
8 TABLET, ORALLY DISINTEGRATING ORAL EVERY 6 HOURS PRN
Qty: 20 TABLET | Refills: 3 | Status: SHIPPED | OUTPATIENT
Start: 2025-05-30

## 2025-05-30 RX ORDER — LEVOFLOXACIN 500 MG/1
500 TABLET, FILM COATED ORAL EVERY 24 HOURS
Qty: 30 TABLET | Refills: 2 | Status: SHIPPED | OUTPATIENT
Start: 2025-05-30

## 2025-05-30 RX ADMIN — SODIUM CHLORIDE 196 MCG: 9 INJECTION INTRAMUSCULAR; INTRAVENOUS; SUBCUTANEOUS at 17:10

## 2025-05-30 ASSESSMENT — FIBROSIS 4 INDEX: FIB4 SCORE: 3.79

## 2025-05-30 NOTE — PROGRESS NOTES
Chemotherapy Verification - PRIMARY RN      Height = 1.64 m  Weight = 63.7 kg  BSA = 1.7 m2       Medication: blinatumomab  Dose: 28 mcg/day x 7 days  Calculated Dose: 196 mcg                             (In mg/m2, AUC, mg/kg)       I confirm this process was performed independently with the BSA and all final chemotherapy dosing calculations congruent.  Any discrepancies of 10% or greater have been addressed with the chemotherapy pharmacist. The resolution of the discrepancy has been documented in the EPIC progress notes.

## 2025-05-30 NOTE — PROGRESS NOTES
"Pharmacy Chemotherapy Verification Note:    Dx: relapsed B-ALL (ph+)        Protocol: Blincyto + TKI Consolidation     Blinatumomab (Blincyto) 28 mcg IV continuous infusion over 24 hrs daily on Days 1-28  Dasatinib 140 mg PO daily on Days 1-42 (or ponatinib 15 mg PO daily on Days 1-42)   - on Dasatinib 70 mg BID since induction (POM)  42-day cycle until transplant, disease progression, or unacceptable toxicity  NCCN Guidelines for ALL. V.2.2024.  Meg DUMAS et al. NEJM. 2020;383(17):1613-23.  Richard SANDERS et al. Lancet Haematol. 2023;10(1):e24-e34.    CNS ppx:  Methotrexate 12 - 15 mg intrathecal or intraventricular with or without hydrocortisone 50 mg  NCCN Guidelines® for Acute Lymphoblastic Leukemia V.1.2023.  Valarie DUMAS et al. [facility]. 2017;41(n01):105-129.b  Richard HANSEN , et al. [facility]te Clin Proc. 2005;80(11):1517-27.c  [facility] . Hematology Am Soc Hematol Educ Program. 2006:142-6.c    Allergies:  Amoxicillin     /85   Pulse (!) 109   Temp 36.6 °C (97.8 °F) (Temporal)   Resp 17   Ht 1.64 m (5' 4.57\")   Wt 63.7 kg (140 lb 6.9 oz)   SpO2 97%   BMI 23.68 kg/m²  Body surface area is 1.7 meters squared.    Labs 5/29/25  ANC~ 3060 Plt = 175k   Hgb = 11.5    Labs 5/30/25     SCr = 0.6 mg/dL CrCl ~ >125 mL/min (minimum SCr of 0.7)   LFT's = 476/294/223 TBili = 0.6   Dr. Faye aware, okay to proceed with treatment today     Drug Order   (Drug name, dose, route, IV Fluid & volume, frequency, number of doses) Cycle: 2 Days 11-17   Previous treatment: C1 - last bag charted on 5/6/25, stopped early per transplant team, then transplant delayed for pulmonary infectious workup.      Medication = blinatumomab  Base Dose = 28 mcg/day  Calc Dose: Base Dose x 7 day = 196 mcg  Final Dose = 196 mcg  Route = CIVI  Fluid & Volume = .8 mL (+ overfill = 108 mL)  Admin Duration = Over 168 hours via CADD pump @ 0.6 mL/hr   Days 1-28  Bag size may change to accommodate up to 7 day infusion       <10% difference, okay to " treat with final dose   By my signature below, I confirm this process was performed independently with the BSA and all final chemotherapy dosing calculations congruent. I have reviewed the above chemotherapy order and that my calculation of the final dose and BSA (when applicable) corroborate those calculations of the  pharmacist.     Tamir England, PharmD

## 2025-05-30 NOTE — PROGRESS NOTES
Chemotherapy Verification - SECONDARY RN       Height = 164 cm  Weight = 63.7 kg  BSA = 1.7 m^2       Medication: Blinatumomab  Dose: 28 mcg/day x7 days  Calculated Dose: 196 mcg via CADD pump over 168 hours                             (In mg/m2, AUC, mg/kg)     I confirm that this process was performed independently.

## 2025-05-31 LAB — VORICONAZOLE SERPL-MCNC: 6.5 UG/ML (ref 1–6)

## 2025-05-31 NOTE — PROGRESS NOTES
JULY presents to infusion for cycle 2/ day 11 of blinatumomab. Pt denies having any new or acute complaints today, reports tolerating past treatments well.     Labs reviewed by RN, not within parameters for treatment today. TORB from dontrell Smith to proceed today with AST, ALT, and alk phos results. Dressing and claves changed per protocol.     Ensured that prescribed volume had been infused and that pump reservoir was empty prior to disconnecting. 10mL blood pulled and line flushed before connecting to new CADD pump.     Patient left in stable condition, knows when to return for next appointment.

## 2025-06-03 ENCOUNTER — HOSPITAL ENCOUNTER (OUTPATIENT)
Facility: MEDICAL CENTER | Age: 25
End: 2025-06-03
Attending: PEDIATRICS
Payer: COMMERCIAL

## 2025-06-03 ENCOUNTER — HOSPITAL ENCOUNTER (OUTPATIENT)
Dept: PEDIATRIC HEMATOLOGY/ONCOLOGY | Facility: MEDICAL CENTER | Age: 25
End: 2025-06-03
Attending: PEDIATRICS
Payer: COMMERCIAL

## 2025-06-03 DIAGNOSIS — Z76.82 BONE MARROW TRANSPLANT CANDIDATE: Primary | ICD-10-CM

## 2025-06-03 DIAGNOSIS — C91.02 ACUTE LYMPHOBLASTIC LEUKEMIA (ALL) IN RELAPSE (HCC): Primary | ICD-10-CM

## 2025-06-03 DIAGNOSIS — Z76.82 BONE MARROW TRANSPLANT CANDIDATE: ICD-10-CM

## 2025-06-03 LAB
ALBUMIN SERPL BCP-MCNC: 4.3 G/DL (ref 3.2–4.9)
ALBUMIN/GLOB SERPL: 1.5 G/DL
ALP SERPL-CCNC: 219 U/L (ref 30–99)
ALT SERPL-CCNC: 184 U/L (ref 2–50)
ANION GAP SERPL CALC-SCNC: 14 MMOL/L (ref 7–16)
ANISOCYTOSIS BLD QL SMEAR: ABNORMAL
AST SERPL-CCNC: 204 U/L (ref 12–45)
BASOPHILS # BLD AUTO: 0.3 % (ref 0–1.8)
BASOPHILS # BLD: 0.02 K/UL (ref 0–0.12)
BILIRUB SERPL-MCNC: 0.5 MG/DL (ref 0.1–1.5)
BUN SERPL-MCNC: 8 MG/DL (ref 8–22)
CALCIUM ALBUM COR SERPL-MCNC: 8.6 MG/DL (ref 8.5–10.5)
CALCIUM SERPL-MCNC: 8.8 MG/DL (ref 8.5–10.5)
CHLORIDE SERPL-SCNC: 100 MMOL/L (ref 96–112)
CO2 SERPL-SCNC: 23 MMOL/L (ref 20–33)
COMMENT 1642: NORMAL
CREAT SERPL-MCNC: 0.63 MG/DL (ref 0.5–1.4)
EOSINOPHIL # BLD AUTO: 0.05 K/UL (ref 0–0.51)
EOSINOPHIL NFR BLD: 0.8 % (ref 0–6.9)
ERYTHROCYTE [DISTWIDTH] IN BLOOD BY AUTOMATED COUNT: 70.5 FL (ref 35.9–50)
GFR SERPLBLD CREATININE-BSD FMLA CKD-EPI: 136 ML/MIN/1.73 M 2
GLOBULIN SER CALC-MCNC: 2.8 G/DL (ref 1.9–3.5)
GLUCOSE SERPL-MCNC: 97 MG/DL (ref 65–99)
HCT VFR BLD AUTO: 39 % (ref 42–52)
HGB BLD-MCNC: 12.5 G/DL (ref 14–18)
HYPOCHROMIA BLD QL SMEAR: ABNORMAL
IMM GRANULOCYTES # BLD AUTO: 0.11 K/UL (ref 0–0.11)
IMM GRANULOCYTES NFR BLD AUTO: 1.8 % (ref 0–0.9)
LYMPHOCYTES # BLD AUTO: 1.93 K/UL (ref 1–4.8)
LYMPHOCYTES NFR BLD: 31 % (ref 22–41)
MACROCYTES BLD QL SMEAR: ABNORMAL
MCH RBC QN AUTO: 29.6 PG (ref 27–33)
MCHC RBC AUTO-ENTMCNC: 32.1 G/DL (ref 32.3–36.5)
MCV RBC AUTO: 92.2 FL (ref 81.4–97.8)
MICROCYTES BLD QL SMEAR: ABNORMAL
MONOCYTES # BLD AUTO: 0.19 K/UL (ref 0–0.85)
MONOCYTES NFR BLD AUTO: 3 % (ref 0–13.4)
MORPHOLOGY BLD-IMP: NORMAL
NEUTROPHILS # BLD AUTO: 3.93 K/UL (ref 1.82–7.42)
NEUTROPHILS NFR BLD: 63.1 % (ref 44–72)
NRBC # BLD AUTO: 0 K/UL
NRBC BLD-RTO: 0 /100 WBC (ref 0–0.2)
PLATELET # BLD AUTO: 132 K/UL (ref 164–446)
PLATELET BLD QL SMEAR: NORMAL
PMV BLD AUTO: 7.5 FL (ref 9–12.9)
POTASSIUM SERPL-SCNC: 3.7 MMOL/L (ref 3.6–5.5)
PROT SERPL-MCNC: 7.1 G/DL (ref 6–8.2)
RBC # BLD AUTO: 4.23 M/UL (ref 4.7–6.1)
RBC BLD AUTO: PRESENT
SODIUM SERPL-SCNC: 137 MMOL/L (ref 135–145)
WBC # BLD AUTO: 6.2 K/UL (ref 4.8–10.8)

## 2025-06-03 PROCEDURE — 36415 COLL VENOUS BLD VENIPUNCTURE: CPT

## 2025-06-03 PROCEDURE — 80053 COMPREHEN METABOLIC PANEL: CPT

## 2025-06-03 PROCEDURE — 85025 COMPLETE CBC W/AUTO DIFF WBC: CPT

## 2025-06-03 PROCEDURE — 80285 DRUG ASSAY VORICONAZOLE: CPT

## 2025-06-03 NOTE — PROGRESS NOTES
Pt to [facility] for lab draw. Labs drawn from the right ac without difficulty / with 1 attempt.   Pt tolerated well.

## 2025-06-04 ENCOUNTER — HOSPITAL ENCOUNTER (OUTPATIENT)
Facility: MEDICAL CENTER | Age: 25
End: 2025-06-04
Attending: PEDIATRICS | Admitting: PEDIATRICS
Payer: COMMERCIAL

## 2025-06-04 ENCOUNTER — ANESTHESIA (OUTPATIENT)
Dept: SURGERY | Facility: MEDICAL CENTER | Age: 25
End: 2025-06-04
Payer: COMMERCIAL

## 2025-06-04 ENCOUNTER — ANESTHESIA EVENT (OUTPATIENT)
Dept: SURGERY | Facility: MEDICAL CENTER | Age: 25
End: 2025-06-04
Payer: COMMERCIAL

## 2025-06-04 VITALS
DIASTOLIC BLOOD PRESSURE: 60 MMHG | WEIGHT: 139.33 LBS | SYSTOLIC BLOOD PRESSURE: 116 MMHG | OXYGEN SATURATION: 96 % | HEART RATE: 92 BPM | RESPIRATION RATE: 18 BRPM | TEMPERATURE: 97.4 F | HEIGHT: 65 IN | BODY MASS INDEX: 23.21 KG/M2

## 2025-06-04 LAB
ANISOCYTOSIS BLD QL SMEAR: ABNORMAL
BASOPHILS # BLD AUTO: 0.4 % (ref 0–1.8)
BASOPHILS # BLD: 0.02 K/UL (ref 0–0.12)
COMMENT 1642: NORMAL
EKG IMPRESSION: NORMAL
EOSINOPHIL # BLD AUTO: 0.04 K/UL (ref 0–0.51)
EOSINOPHIL NFR BLD: 0.7 % (ref 0–6.9)
ERYTHROCYTE [DISTWIDTH] IN BLOOD BY AUTOMATED COUNT: 77.5 FL (ref 35.9–50)
HCT VFR BLD AUTO: 36.6 % (ref 42–52)
HGB BLD-MCNC: 11.1 G/DL (ref 14–18)
HGB RETIC QN AUTO: 35.6 PG/CELL (ref 29–35)
IMM GRANULOCYTES # BLD AUTO: 0.09 K/UL (ref 0–0.11)
IMM GRANULOCYTES NFR BLD AUTO: 1.7 % (ref 0–0.9)
IMM RETICS NFR: 28.7 % (ref 2.6–16.1)
LYMPHOCYTES # BLD AUTO: 1.85 K/UL (ref 1–4.8)
LYMPHOCYTES NFR BLD: 34.5 % (ref 22–41)
MACROCYTES BLD QL SMEAR: ABNORMAL
MAR PATH BX REPORT: NORMAL
MCH RBC QN AUTO: 28.2 PG (ref 27–33)
MCHC RBC AUTO-ENTMCNC: 30.3 G/DL (ref 32.3–36.5)
MCV RBC AUTO: 93.1 FL (ref 81.4–97.8)
MICROCYTES BLD QL SMEAR: ABNORMAL
MONOCYTES # BLD AUTO: 0.2 K/UL (ref 0–0.85)
MONOCYTES NFR BLD AUTO: 3.7 % (ref 0–13.4)
MORPHOLOGY BLD-IMP: NORMAL
NEUTROPHILS # BLD AUTO: 3.16 K/UL (ref 1.82–7.42)
NEUTROPHILS NFR BLD: 59 % (ref 44–72)
NRBC # BLD AUTO: 0 K/UL
NRBC BLD-RTO: 0 /100 WBC (ref 0–0.2)
OVALOCYTES BLD QL SMEAR: NORMAL
PATHOLOGY CONSULT NOTE: NORMAL
PLATELET # BLD AUTO: 105 K/UL (ref 164–446)
PLATELET BLD QL SMEAR: NORMAL
PMV BLD AUTO: 7.5 FL (ref 9–12.9)
POIKILOCYTOSIS BLD QL SMEAR: NORMAL
RBC # BLD AUTO: 3.93 M/UL (ref 4.7–6.1)
RBC BLD AUTO: PRESENT
RETICS # AUTO: 0.11 M/UL (ref 0.04–0.12)
RETICS/RBC NFR: 2.4 % (ref 0.8–2.6)
SCHISTOCYTES BLD QL SMEAR: NORMAL
WBC # BLD AUTO: 5.4 K/UL (ref 4.8–10.8)

## 2025-06-04 PROCEDURE — 99236 HOSP IP/OBS SAME DATE HI 85: CPT | Mod: 25 | Performed by: PEDIATRICS

## 2025-06-04 PROCEDURE — 160027 HCHG SURGERY MINUTES - 1ST 30 MINS LEVEL 2: Performed by: PEDIATRICS

## 2025-06-04 PROCEDURE — 700105 HCHG RX REV CODE 258: Performed by: ANESTHESIOLOGY

## 2025-06-04 PROCEDURE — 700101 HCHG RX REV CODE 250: Performed by: ANESTHESIOLOGY

## 2025-06-04 PROCEDURE — 160035 HCHG PACU - 1ST 60 MINS PHASE I: Performed by: PEDIATRICS

## 2025-06-04 PROCEDURE — 38220 DX BONE MARROW ASPIRATIONS: CPT | Performed by: PEDIATRICS

## 2025-06-04 PROCEDURE — 85046 RETICYTE/HGB CONCENTRATE: CPT

## 2025-06-04 PROCEDURE — 160048 HCHG OR STATISTICAL LEVEL 1-5: Performed by: PEDIATRICS

## 2025-06-04 PROCEDURE — 160009 HCHG ANES TIME/MIN: Performed by: PEDIATRICS

## 2025-06-04 PROCEDURE — 160025 RECOVERY II MINUTES (STATS): Performed by: PEDIATRICS

## 2025-06-04 PROCEDURE — 36415 COLL VENOUS BLD VENIPUNCTURE: CPT

## 2025-06-04 PROCEDURE — 160015 HCHG STAT PREOP MINUTES: Performed by: PEDIATRICS

## 2025-06-04 PROCEDURE — 88185 FLOWCYTOMETRY/TC ADD-ON: CPT | Mod: 91

## 2025-06-04 PROCEDURE — 85025 COMPLETE CBC W/AUTO DIFF WBC: CPT

## 2025-06-04 PROCEDURE — 93010 ELECTROCARDIOGRAM REPORT: CPT | Performed by: INTERNAL MEDICINE

## 2025-06-04 PROCEDURE — 85097 BONE MARROW INTERPRETATION: CPT | Performed by: PATHOLOGY

## 2025-06-04 PROCEDURE — 81207 BCR/ABL1 GENE MINOR BP: CPT

## 2025-06-04 PROCEDURE — 700111 HCHG RX REV CODE 636 W/ 250 OVERRIDE (IP): Mod: JZ | Performed by: ANESTHESIOLOGY

## 2025-06-04 PROCEDURE — 93005 ELECTROCARDIOGRAM TRACING: CPT | Mod: TC | Performed by: PEDIATRICS

## 2025-06-04 PROCEDURE — 160002 HCHG RECOVERY MINUTES (STAT): Performed by: PEDIATRICS

## 2025-06-04 PROCEDURE — 88184 FLOWCYTOMETRY/ TC 1 MARKER: CPT

## 2025-06-04 PROCEDURE — 160046 HCHG PACU - 1ST 60 MINS PHASE II: Performed by: PEDIATRICS

## 2025-06-04 RX ORDER — ALBUTEROL SULFATE 5 MG/ML
2.5 SOLUTION RESPIRATORY (INHALATION)
Status: DISCONTINUED | OUTPATIENT
Start: 2025-06-04 | End: 2025-06-04 | Stop reason: HOSPADM

## 2025-06-04 RX ORDER — LIDOCAINE HYDROCHLORIDE 20 MG/ML
INJECTION, SOLUTION EPIDURAL; INFILTRATION; INTRACAUDAL; PERINEURAL PRN
Status: DISCONTINUED | OUTPATIENT
Start: 2025-06-04 | End: 2025-06-04 | Stop reason: SURG

## 2025-06-04 RX ORDER — ONDANSETRON 2 MG/ML
4 INJECTION INTRAMUSCULAR; INTRAVENOUS
Status: DISCONTINUED | OUTPATIENT
Start: 2025-06-04 | End: 2025-06-04 | Stop reason: HOSPADM

## 2025-06-04 RX ORDER — SODIUM CHLORIDE, SODIUM LACTATE, POTASSIUM CHLORIDE, CALCIUM CHLORIDE 600; 310; 30; 20 MG/100ML; MG/100ML; MG/100ML; MG/100ML
INJECTION, SOLUTION INTRAVENOUS
Status: DISCONTINUED | OUTPATIENT
Start: 2025-06-04 | End: 2025-06-04 | Stop reason: SURG

## 2025-06-04 RX ORDER — OXYCODONE HCL 5 MG/5 ML
5 SOLUTION, ORAL ORAL
Status: DISCONTINUED | OUTPATIENT
Start: 2025-06-04 | End: 2025-06-04 | Stop reason: HOSPADM

## 2025-06-04 RX ORDER — HYDROMORPHONE HYDROCHLORIDE 1 MG/ML
0.4 INJECTION, SOLUTION INTRAMUSCULAR; INTRAVENOUS; SUBCUTANEOUS
Status: DISCONTINUED | OUTPATIENT
Start: 2025-06-04 | End: 2025-06-04 | Stop reason: HOSPADM

## 2025-06-04 RX ORDER — DIPHENHYDRAMINE HYDROCHLORIDE 50 MG/ML
12.5 INJECTION, SOLUTION INTRAMUSCULAR; INTRAVENOUS
Status: DISCONTINUED | OUTPATIENT
Start: 2025-06-04 | End: 2025-06-04 | Stop reason: HOSPADM

## 2025-06-04 RX ORDER — HYDROMORPHONE HYDROCHLORIDE 1 MG/ML
0.2 INJECTION, SOLUTION INTRAMUSCULAR; INTRAVENOUS; SUBCUTANEOUS
Status: DISCONTINUED | OUTPATIENT
Start: 2025-06-04 | End: 2025-06-04 | Stop reason: HOSPADM

## 2025-06-04 RX ORDER — MEPERIDINE HYDROCHLORIDE 25 MG/ML
12.5 INJECTION INTRAMUSCULAR; INTRAVENOUS; SUBCUTANEOUS
Status: DISCONTINUED | OUTPATIENT
Start: 2025-06-04 | End: 2025-06-04 | Stop reason: HOSPADM

## 2025-06-04 RX ORDER — HALOPERIDOL 5 MG/ML
1 INJECTION INTRAMUSCULAR
Status: DISCONTINUED | OUTPATIENT
Start: 2025-06-04 | End: 2025-06-04 | Stop reason: HOSPADM

## 2025-06-04 RX ORDER — HYDRALAZINE HYDROCHLORIDE 20 MG/ML
5 INJECTION INTRAMUSCULAR; INTRAVENOUS
Status: DISCONTINUED | OUTPATIENT
Start: 2025-06-04 | End: 2025-06-04 | Stop reason: HOSPADM

## 2025-06-04 RX ORDER — SODIUM CHLORIDE, SODIUM LACTATE, POTASSIUM CHLORIDE, CALCIUM CHLORIDE 600; 310; 30; 20 MG/100ML; MG/100ML; MG/100ML; MG/100ML
INJECTION, SOLUTION INTRAVENOUS CONTINUOUS
Status: DISCONTINUED | OUTPATIENT
Start: 2025-06-04 | End: 2025-06-04 | Stop reason: HOSPADM

## 2025-06-04 RX ORDER — OXYCODONE HCL 5 MG/5 ML
10 SOLUTION, ORAL ORAL
Status: DISCONTINUED | OUTPATIENT
Start: 2025-06-04 | End: 2025-06-04 | Stop reason: HOSPADM

## 2025-06-04 RX ORDER — HYDROMORPHONE HYDROCHLORIDE 1 MG/ML
0.5 INJECTION, SOLUTION INTRAMUSCULAR; INTRAVENOUS; SUBCUTANEOUS
Status: DISCONTINUED | OUTPATIENT
Start: 2025-06-04 | End: 2025-06-04 | Stop reason: HOSPADM

## 2025-06-04 RX ADMIN — FENTANYL CITRATE 50 MCG: 50 INJECTION, SOLUTION INTRAMUSCULAR; INTRAVENOUS at 11:08

## 2025-06-04 RX ADMIN — PROPOFOL 160 MCG/KG/MIN: 10 INJECTION, EMULSION INTRAVENOUS at 11:09

## 2025-06-04 RX ADMIN — PROPOFOL 20 MG: 10 INJECTION, EMULSION INTRAVENOUS at 11:15

## 2025-06-04 RX ADMIN — PROPOFOL 50 MG: 10 INJECTION, EMULSION INTRAVENOUS at 11:08

## 2025-06-04 RX ADMIN — SODIUM CHLORIDE, POTASSIUM CHLORIDE, SODIUM LACTATE AND CALCIUM CHLORIDE: 600; 310; 30; 20 INJECTION, SOLUTION INTRAVENOUS at 11:00

## 2025-06-04 RX ADMIN — LIDOCAINE HYDROCHLORIDE 60 MG: 20 INJECTION, SOLUTION EPIDURAL; INFILTRATION; INTRACAUDAL; PERINEURAL at 11:08

## 2025-06-04 RX ADMIN — FENTANYL CITRATE 50 MCG: 50 INJECTION, SOLUTION INTRAMUSCULAR; INTRAVENOUS at 11:03

## 2025-06-04 ASSESSMENT — PAIN DESCRIPTION - PAIN TYPE
TYPE: SURGICAL PAIN

## 2025-06-04 ASSESSMENT — PAIN SCALES - GENERAL: PAIN_LEVEL: 0

## 2025-06-04 ASSESSMENT — FIBROSIS 4 INDEX: FIB4 SCORE: 2.62

## 2025-06-04 NOTE — H&P
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 demonstrated a WBC of 0.4, Hgb 8.8 and platelets 125.  ANC at the time was measured at only 170.  JULY was otherwise clinically well appearing.  He did receive a one-time normal saline bolus for his nausea and vomiting and was sent home with instructions to HOLD his oral mercaptopurine and oral methotrexate which began being held on 7/28/2023.  Per protocol, imatinib was continued at previous dose of 600 mg in the morning. Tomas Roy would return to clinic again on 8/2/2023 and 8/7/2023.  On both occasions, ANC remained under 500 and oral therapy (with the exception of imatinib) continue to be held while awaiting count recovery.  Ultimately, on 8/11/2023 after 14 days of therapy being held, Tomas Roy returned to clinic and WBC had increased to 2.1 with ANC increasing to 1500 with an absolute monocyte count of 290. Tomas Roy was then instructed to resume his oral chemotherapy at 100% of  prior dosing with (due to timing with Day 1 of Cycle 2 with LP 3 days later, no weekly MTX was administered).  Imatinib was never held.  On 8/14/2023 he was seen in clinic for Day 1 of Cycle 2 of Maintenance with lumbar puncture, vincristine, and 5-day pulse of steroids.  At the time, his ANC was 2010 and his oral chemotherapy was continued at 100% dosing.  Given his history of previously drop in counts, he was scheduled to come back for repeat labs on 8/29/2023 at which point his WBC count was 1.4 and his ANC remained greater than 500 at 1140.  Again, his therapy was continued with imatinib 550 mg by mouth in the morning as well as 100% dosing of methotrexate and 100% dosing of 6-mercaptopurine.  When Tomas Roy returned to clinic on 9/11/2023 for his Maintenance, Cycle 2, Day 29 therapy he was noted to have had another drop in his WBC to 600 and his ANC accordingly was also decreased at 410.  He did receive vincristine in accordance with protocol as well as starting a 5-day pulse of prednisone per protocol.  Given however that his ANC had dropped below 500 his oral methotrexate and oral 6-MP were again held.  He was instructed return to clinic 1 week later for lab evaluations.  On 9/19/2023 he returned to clinic and WBC had improved slightly to 0.8 however ANC remained low at 260 with an absolute monocyte count of 220.  Given that counts not recovered his oral chemotherapy remained held for an additional week.  On 9/26/2023 at 14 days after initially holding chemotherapy, he was seen back in clinic at which time WBC improved again to 1.1 however ANC did not quite recover and remained at 490 with an absolute monocyte count of 330.  Given that 2 weeks had elapsed with myelosuppression, imatinib was held in addition to oral 6-MP and methotrexate. Tomas Roy was instructed to return to clinic on 9/29/2023 for repeat counts.  On 9/29/2023 WBC had improved to 2.4 and ANC had finally recovered with 1530 and  an absolute monocyte count of 510.  Given a recovery with ANC greater than 750 and platelets greater than 75, oral chemotherapy was restarted at 50% of initial dosing and imatinib was restarted at 100% of initial dosing.  On 10/9/2023, Tomas Roy returned to clinic for his Day 57 of Cycle 2 visit.  At the time of his visit, his ANC had recovered after holding chemotherapy and then restarting at 50% dosing 11 days prior.  He did however in clinic spiked a temperature 102.5 °F.  For this reason despite his ANC being improved, no dose adjustments were made in his chemotherapy.  He continued with 50% dosing with 100% adherence of his 6-MP and methotrexate as well as his imatinib at 550 mg PO QHS.   Tomas Roy was then seen in clinic again on 11/6/2023 for his Day 1 of Cycle 3 of Maintenance therapy.  At the time, his imatinib dose was adjusted back up to 600 mg daily taken in the morning.  Additionally, his methotrexate was adjusted back up to 75% of expected dosing and his mercaptopurine was increased to 75% of expected dosing as well.  He will return to clinic on 12/4/2023 for his Day 29 of Cycle 3 therapy.  At the time, his ANC was exactly 1500 and therefore no dose adjustments were made and he continued at 75% dosing for oral chemotherapy as well as the imatinib dose of 600 mg daily.  On 1/2/2024 he was seen for his Day 57 evaluations and his ANC had dropped to 1450 again not prompting any change in oral chemotherapy dosing.  Tomas Roy completed his Cycle 4 chemotherapy without any adverse events or complications.  He did not have any changes in medications either.  Most recently,  Tomas Roy was seen in clinic on 4/22/2023 for his Day 1 of Cycle 5 chemotherapy.  At the time, a lumbar puncture was performed and IT chemotherapy was provided.  He tolerated the procedure and the therapy well without any sequelae.  His ANC at the time was > 1500 however the dose adjustment was made as this was  "the first ANC greater than 1500 and Tomas Roy had a history of precipitous drops in his counts with increases in his oral chemotherapy.  On 5/20/2024, Tomas Roy presented to clinic for his Cycle 5, Day 29 therapy and evaluations. At that time, he was started on 5 day pulse of prednisone and his oral methotrexate dose was increased to 13 tablets PO weekly (90.78% of expected dosing). His port was removed on 5/20/2024 by Dr. Moise. He completed therapy on 5/30/2024.  Since his completion of therapy and poor removal, he has been seen in follow-up and was most recently seen on 1/15/2025 at which point there was no evidence of relapse or recurrence both clinically or in his labs.  Interval history however is remarkable for upper respiratory symptoms approximately 1-1/2 weeks ago.  At the time, he reported having some nausea and vomiting as well as an upset stomach and sore throat.  The symptoms were thought most likely to be viral and in fact improved consistent with viral illness.  On about 2/24/2025 however symptoms returned and were more flulike in nature with aches and pains and low-grade temperatures.  On the evening of 2/26/2025, JULY called Pediatric Hematology/Oncology to report that he \"feels like I am relapsing\". Tomas Roy was brought in to clinic today for evaluations.  In clinic, CBC, CMP, respiratory viral panel and EBV studies were obtained.  Respiratory viral studies were negative.  CBC however demonstrated a white blood cell count of 1, hemoglobin 10.6, platelets of 53 without any circulating blasts.  His CMP for the most part was normal with mild hyponatremia.  Uric acid was 7.2 and an LDH of 244.  Given these lab values as well as a temperature of 100.6 while in clinic also in the context of an acute infection of his right fourth phalanx, decision was made to bring JULY back to the hospital for admission for fever and neutropenia as well as workup of presumed relapsed disease.  He was " admitted on 2/27/2025.  Workup was remarkable for pancytopenia.  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  In addition to his diagnostic bone marrow and diagnostic lumbar puncture which was negative for disease (CNS 1) he also had a 7 [place] double-lumen Broviac placed on the same day.  He was started on high-dose prednisone on his admission.  After results confirmed relapsed disease, his case was discussed with [facility] and it was decided that he should receive a 3 drug reinduction.  As such, lumbar puncture with intrathecal chemotherapy was administered on 3/6/2025 and he was given his Day 1 vincristine.  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  On 3/22/2025, Tomas Roy reported significant perirectal pain, especially with stooling and also reported bloody and mucus filled stools at home.  For this reason in addition to not feeling well he was brought in for admission.  On admission he was noted to be neutropenic and also developed fever prompting his stay for fever and neutropenia.  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   Tomas Roy had his End of Reinduction evaluations on 4/4/2025.  Bone marrow at the time demonstrated a minuscule population of atypical lymphocytes initially thought to be consistent with residual disease however below the level of detection.  CAR-T workup had been performed and there was a plan to enroll on a dual CAR protocol at [facility].  Ultimately, Tomas Roy was discharged from the hospital on 4/9/2025 with plans to follow-up at [place] on 4/14/2025.  Upon his presentation to [place], there was concern however that his disease status was in complete remission  and/or he had lost CD19 and CD22 expression.  For this reason, rather than being enrolled on study, repeat bone marrow was obtained at [place].  Repeat bone marrow did not demonstrate any residual disease consistent with complete remission.  BCR-ABL RT-PCR was also consistent with these findings.  Given complete remission, Tomas Roy was no longer considered a candidate for CAR-T and he was discharged back home to [place] to receive consolidating immune therapy prior to transplantation at [facility].  On 4/24/2025 he was admitted for Blinatumomab Block 1 therapy.  His hospitalization was complicated by fever, clinical sepsis without identification as well as body aches and pains.  His clinical sepsis was thought to be due to adrenal insufficiency and he was started on Solu-Cortef and ultimately placed on wean before his discharge on 4/30/2025..  He was ultimately discharged on 4/30/2025.  Interval history was remarkable for completion of his transplant workup and travel to [place] for proposed transplant.  At [place], follow-up of abnormal findings on CT of the chest with a BAL demonstrated both CMV as well as Aspergillus.  For this reason, he was started on valganciclovir as well as voriconazole.  Given these new findings, his transplant was placed on hold temporarily.  He was collected however for his Tallo10 study before being sent back to [place] for another month of blinatumomab and Dasatinib therapy.  On 5/19/2025, Tomas Roy presented to [facility] for start of an additional Block 2 of Blinatumomab.  He tolerated initiation of his therapy without any adverse events. Voriconazole levels that were drawn inpatient did result in the supratherapeutic range and his therapy was held x 2 doses before it was restarted at 66% dosing.  His most recent voriconazole level on 6/3/2025 had improved as had his transaminitis.  Today, he presents to the OR for pre-SCT bone marrow evaluation with flow-cytometry and  NGS.     On presentation, Tomas Roy reports that clinically well.  For the most part, his fatigue and GI complaints have resolved.  He reports appetite and oral intake have improved.  No complaints of any headaches, changes in vision or neurological status changes.  Tomas Roy reports that he has not had any skin changes or rashes.  No complaints of any aches or pains.  Taking all of his prescribed oral medication including dasatinib, voriconazole and valganciclovir with 100% adherence.  No other concerns or complaints at this time.      Review of Systems:      Constitutional: Afebrile.  No recent or remote illness.  Energy and activity are at baseline.  HENT: Negative for auditory changes, nosebleeds and sore throat.  No mouth sores.  Eyes: Negative for visual changes.  Respiratory: Negative for  shortness of breath and stridor.   Cardiovascular: Negative for chest pain and leg swelling.    Gastrointestinal: Improved/resolved nausea, diarrhea and lack of appetite.   Genitourinary: Negative for dysuria and flank pain.    Musculoskeletal: Negative.  Skin: Negative for rash, signs of infection.  Neurological: Negative for numbness, tingling, sensory changes, weakness or headaches.    Endo/Heme/Allergies: Does not bruise/bleed easily.    Psychiatric/Behavioral: No changes in mood, appropriate for age.      PAST MEDICAL HISTORY:      Oncologic History:  2-3 week history of worsening fatigue, right lower extremity pain  Presentation to OS and diagnosed with right LE superficial thrombus, subsegmental PE and hyperleukocytosis, anemia and thrombocytopenia  Transferred to [facility] for definitive care  Presenting (local) WBC > 440K, Hgb 10.0, platelets 53, (automated differential ANC 3190, ALC 75,310, absolute monocyte count 41097, absolute blast count 340,560)  Uric Acid 15.6, phosphorus 5.6, LDH 1114  Rasburicase x 1 dose given   Peripheral Blood flow cytometry demonstrating CD10 pos, CD19 pos, CD20 neg, CD22  dim (60%) 5/28/2022  Peripheral blood FISH for BCR-ABL1 positive in 98% of analyzed cells     Age at Diagnosis: 21 years  White Blood Cell Count at Presentation: > 440 k/uL  Testicular Disease Status: Negative (see procedure note 5/30/2022)  CNS Status: 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, diagnostic LP with WBC 1, RBC 3 and no evidence of leukemic blasts 5/30/2022  Steroid Pre-treatment: None  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     All inclusion/exclusion criteria for [id number] met and consent signed - enrolled 5/29/2022   All inclusion/exclusion criteria for [id number] met and consent signed - enrolled 5/30/2022  Confirmatory bone marrow aspirate and biopsy and diagnostic LP + cytarabine 70 mg IT 5/30/2022  Induction therapy (ON STUDY [id number]) started 5/30/2022  Bone marrow immunohistochemistry consistent with diagnosis of B-ALL comprising 90% of marrow cellularity  Bone marrow sample sent to [facility] for [facility] purposes:  Flow cytom  Of the blood pressure little high that is a problem is a cultural problem is well and cultural genetic and everything else like that unfortunately breathalyzers such bad heart disease diabetes things like that  populations etry consistent with peripheral blood, cytogenetics remarkable for known t(9;22)  CSF with WBC 1, RBC 3, no blasts identified on cytospin  FISH results available 5/31/2022 making patient eligible for transfer from [place] [address] to [place] [address] as eligibility requirements were met for [place] [address]  Patient unenrolled from [place] [address] (BCR-ABL1 fusion positive) 6/1/2022  Consent signed for [place] [address] and patient enrolled 6/1/2022 (see eligibility checklist from 5/31/2022 and consent note from 6/1/2022)  Imatinib 400 mg PO QAM / 200 mg PO QPM started 6/3/2022 (allowed per [place] [address])  Patient completed the first 15 days of a Standard 4-drug Induction on 6/13/2022  Start of [place] [id number](OS),  "Induction IA Part 2, Day 15 6/13/2022  End of Induction 1A Part 2 - MRD negative  Start of [facility] [id number](OS), Induction IA Part 2, Day 15 7/5/2022  Induction IB DELAYED 2 weeks 14 days from 7/26/2022-8/9/2022) for myelosuppression - Start of Day 22 cytarabine block 8/9/2022  Induction IB Day 42 delayed 9 days for myelosuppression - Day 42 evaluations 9/7/2022  End of Induction IB - Flow cytometric MRD negative, MRD by IgH-TCR PCR [id number]%  Randomization to AR-Experimental Arm B of [id number]  Start of AR-Experimental Arm B, Interim Maintenance 9/29/2022  Weight based increase in dose of imatinib to 400 mg PO AM and 250 mg PM 9/29/2022  Thrombocytopenia not permissive of proceeding with Day 15 of Interim Maintenance  AR-Experimental Arm B, Interim Maintenance, Day 15 delayed 4 days, start 10/17/2022  AR-Experimental Arm B, Interim Maintenance, Day 29, start 11/1/2022  AR-Experimental Arm B, Interim Maintenance, Day 43, start 11/14/2022  Last does of 6-MP 11/27/2022  AR-Experimental Arm B, Delayed Intensification, Day 1, start 12/6/2022  Admission with Severe Septic Shock 12/27/2022  Imatinib HELD 12/27/2022-1/8/2023  AR-Experimental Arm B, Delayed Intensification, Day 29 DELAYED 14 days with start 1/17/2023  Hospitalization 2/7/2023 on Day 50 of Delayed Intensification with left shoulder pain ultimately diagnosed with Bacteroides fragilis infection  AR-Experimental Arm B, Interim Maintenance, Day 1 DELAYED 7 days with start 2/27/2023  AR-Experimental Arm B, Interim Maintenance, Day 11 DELAYED 4 days for platelets 43K on 3/9/2023  AR-Experimental Arm B, Interim Maintenance, Day 11 VCR given and MTX omitted for platelets 43K 3/13/2023  Imatinib HELD 3/30/2023-4/11/2023  AR-Experimental Arm B, Interim Maintenance, Day 21 (DELAYED 22 DAYS) - administered 4/11/2023 with methotrexate 80 mg/m² IV  AR-Experimental Arm B, Interim Maintenance, Day 31 (\"true\" Day 31 4/25/2023) - VCR and IT MTX given 4/28/2023 - IV MTX " omitted  AR-Experimental Arm B, Interim Maintenance, Day 41 met with counts - administered 5/5/2023 with methotrexate 80 mg/m² IV     Start of Maintenance, Cycle 1, Day 1 -5/22/2023  Cranial radiation 6/5/2023-6/16/2023 (10 fractions)  Maintenance, Cycle 1, Day 29 - 6/23/2023 (no IT MTX given)  Maintenance, Cycle 1, Day 67, presented with fatigue nausea and vomiting found to have ANC less than 500  Methotrexate (oral) and mercaptopurine held 7/27/2023 - continued with imatinib  Methotrexate (oral) and mercaptopurine held 8/2/2023 - continued with imatinib  Methotrexate (oral) and mercaptopurine held 8/7/2023 - continued with imatinib  Restarted methotrexate (oral) and mercaptopurine at 100% previous dosing on 8/11/2023 - continued with imatinib  Maintenance, Cycle 2, Day 1 - 8/14/2023  Maintenance, Cycle 2, Day 29 - 9/11/2023  Methotrexate (oral) and mercaptopurine held 9/11/2023 - continued with imatinib  Methotrexate (oral) and mercaptopurine held 9/19/2023 - continued with imatinib  Methotrexate (oral) and mercaptopurine held 9/26/2023 - HELD imatinib  Methotrexate (oral) restarted at 50% dosing and mercaptopurine restarted at 50% dosing 9/29/2023 - RESTARTED imatinib  Maintenance, Cycle 2, Day 57 - 10/9/2023  Maintenance, Cycle 3, Day 1 - 11/6/2023: Methotrexate (oral) increased to 75% dosing and mercaptopurine increased to 75% dosing.  Imatinib increased to 600 mg QAM 11/6/2023  Maintenance Cycle 3, Day 29: 12/4/2023 No changes made to the dosing of oral chemotherapy  Maintenance, Cycle 4, Day 1: No dose adjustments made however ANC slightly greater than >1500.  Oral chemotherapy did not need weight adjustment.  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  confering resistance to imatinib     Imatinib discontinued, dasatanib started 3/22/2025     End of Re-Induction 4/4/2025 demonstrating suspicious population of phenotypically atypical cells at 0.005%  BCR-ABL RT-PCR 4/4/2025 detectable at 0.967964%     Repeat bone marrow evaluation at [facility] 4/15/2025 MRD negative  BCR-ABL RT-PCR 4/15/2025 undetectable     Blinatumomab Block 1 4/24/2025  Tallo10 collection last week  Blinatumomab Block 2 5/19/2025    Voriconazole toxicity -supratherapeutic requiring dose adjustment     Past Medical History:    1) Previously Healthy  2) 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  3) Hyperleukocytosis  4) Hyperuricemia  5) Hyperphosphatemia  6) Right Lower Extremity Superficial Thrombus  7) Subsegmental Pulmonary Embolism  8) 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  9) Bacteroides fragilis soft tissue infection left shoulder  10) Anxiety/Depression  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  Both maternal and paternal family history of diabetes.     Immunizations:  UTD     Social History:   Lives with girlfriend.  Graduated from college.  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  Social situation with family is fractured.     Medications:   No current facility-administered medications on file prior to encounter.     Current Outpatient Medications on File Prior to Encounter   Medication Sig Dispense Refill    voriconazole (VFEND) 200 MG Recon Soln Take 200 mg by mouth every day.      valGANciclovir (VALCYTE) 450 MG tablet Take 450 mg by mouth every day.      dasatinib  "(SPRYCEL) 70 MG tablet Take 1 Tablet by mouth 2 times a day. 60 Tablet 4    LORazepam (ATIVAN) 1 MG Tab Take 1 Tablet by mouth at bedtime as needed (Nausea and Vomiting) for up to 180 days. 30 Tablet 5           sulfamethoxazole-trimethoprim (BACTRIM DS) 800-160 MG tablet Take 1 Tablet by mouth 2 times a day. 16 Tablet 3    calcium carbonate (TUMS EX) 750 MG chewable tablet Chew 2 Tablets 3 times a day with meals. 30 Tablet 2    vitamin D (CHOLECALCIFEROL) 1000 UNIT Tab Take 1 Tablet by mouth every day. (Patient taking differently: Take 1,000 Units by mouth.) 60 Tablet 2    senna-docusate (SENNA-S) 8.6-50 MG Tab Take 1 Tablet by mouth every day. (Patient taking differently: Take 1 Tablet by mouth 1 time a day as needed.) 30 Tablet 0       OBJECTIVE:      Vitals:   /88   Pulse 92   Temp (!) 35.7 °C (96.3 °F) (Temporal)   Resp 16   Ht 1.651 m (5' 5\")   Wt 63.2 kg (139 lb 5.3 oz)   SpO2 99%   BMI 23.19 kg/m²      Labs:    Hospital Outpatient Visit on 06/03/2025   Component Date Value    WBC 06/03/2025 6.2     RBC 06/03/2025 4.23 (L)     Hemoglobin 06/03/2025 12.5 (L)     Hematocrit 06/03/2025 39.0 (L)     MCV 06/03/2025 92.2     MCH 06/03/2025 29.6     MCHC 06/03/2025 32.1 (L)     RDW 06/03/2025 70.5 (H)     Platelet Count 06/03/2025 132 (L)     MPV 06/03/2025 7.5 (L)     Neutrophils-Polys 06/03/2025 63.10     Lymphocytes 06/03/2025 31.00     Monocytes 06/03/2025 3.00     Eosinophils 06/03/2025 0.80     Basophils 06/03/2025 0.30     Immature Granulocytes 06/03/2025 1.80 (H)     Nucleated RBC 06/03/2025 0.00     Neutrophils (Absolute) 06/03/2025 3.93     Lymphs (Absolute) 06/03/2025 1.93     Monos (Absolute) 06/03/2025 0.19     Eos (Absolute) 06/03/2025 0.05     Baso (Absolute) 06/03/2025 0.02     Immature Granulocytes (a* 06/03/2025 0.11     NRBC (Absolute) 06/03/2025 0.00     Hypochromia 06/03/2025 1+     Anisocytosis 06/03/2025 2+ (A)     Macrocytosis 06/03/2025 1+     Microcytosis 06/03/2025 1+     " Sodium 06/03/2025 137     Potassium 06/03/2025 3.7     Chloride 06/03/2025 100     Co2 06/03/2025 23     Anion Gap 06/03/2025 14.0     Glucose 06/03/2025 97     Bun 06/03/2025 8     Creatinine 06/03/2025 0.63     Calcium 06/03/2025 8.8     Correct Calcium 06/03/2025 8.6     AST(SGOT) 06/03/2025 204 (H)     ALT(SGPT) 06/03/2025 184 (H)     Alkaline Phosphatase 06/03/2025 219 (H)     Total Bilirubin 06/03/2025 0.5     Albumin 06/03/2025 4.3     Total Protein 06/03/2025 7.1     Globulin 06/03/2025 2.8     A-G Ratio 06/03/2025 1.5     GFR (CKD-EPI) 06/03/2025 136     Plt Estimation 06/03/2025 Decreased     RBC Morphology 06/03/2025 Present     Peripheral Smear Review 06/03/2025 see below     Comments-Diff 06/03/2025 see below      Physical Exam:     Constitutional: Well-developed, well-nourished, and in no distress.  Very well-appearing.  HENT: Normocephalic and atraumatic. No nasal congestion or rhinorrhea. Oropharynx is clear and moist. No oral ulcerations or sores.    Eyes: Conjunctivae are normal. Pupils are equal, round.  EOMI.  Nonicteric.  Glasses.  Neck: Normal range of motion of neck, no adenopathy.    Cardiovascular: Normal rate, regular rhythm and normal heart sounds.  No murmur heard. DP/radial pulses 2+, cap refill < 2 sec.  Pulmonary/Chest: Effort normal and breath sounds normal. No respiratory distress. Symmetric expansion.  No crackles or wheezes.  Abdomen: Soft. Bowel sounds are normal. No distension and no mass. There is no hepatosplenomegaly.    Genitourinary:  Deferred.  Musculoskeletal: Normal range of motion of lower and upper extremities bilaterally. No tenderness to palpation of elbows, wrists, hands, knees, ankles and feet bilaterally.   No longer tender, 1 cm firm palpable nodule in the right medial thigh..  Lymphadenopathy: No cervical adenopathy.  Neurological: Alert and oriented to person and place. Exhibits normal muscle tone bilaterally in upper and lower extremities. Gait normal.  Coordination normal.    Skin: Skin is warm, dry and pink.  No rash or evidence of skin infection.  No pallor.   Psychiatric: Mood and affect normal for age.     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 Likely plan for HSCT, search for MUD ongoing. After discussing multiple options, they decided on a 3-Drug Re-Induction (VCR/PRED/PEG-ASP) +Imatinib followed by blinatumomab. 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 Met virtually with Dr. Adrian, [place] [facility] 3/20/2025     - Has had discussions with both bone marrow transplant as well as some therapeutics (CAR-T) at [facility].  Complete  remission after re-induction, making CAR-T ineligible  - End of Reinduction evaluations demonstrating phenotypically similar aberrant lymphoblast population of 0.005% (however lacking CD19 or CD22)   - Repeat BM at [place] confirming remission     - Given CR2, will pursue transplantation     2) Individualized Bridging Therapy with Blinatumomab Block 2, Day 17:  ** Dexamethasone prior to starting blinatumomab, 12 mg PO x 1 (COMPLETE)  ** Blinatumomab 28 mCg per day IV continuous infusion for 28 days, started on 5/20/2025.    ** Will D/C on 6/11/2025 prior to SCT      ** Continue Dasatinib 70 mg PO BID which was started 3/22/2025 - continue reduction in dose to dasatinib 100 mg PO daily (as BCR-ABL RT PCR demonstrates remission **     3) Pulmonary Aspergillus:   - Pretransplant CT chest demonstrating lesion in the right minor fissure concerning for infection  - BAL at [facility] demonstrating pulmonary aspergillus  - Started on voriconazole 300 mg PO Q12 hours  - Voriconazole level 5/19/2025 supratherapeutic at 8.5 on 5/19/2025  - Repeat level before interventions 6.5 on 5/29/2025  - Held 2 doses of voriconazole with before restarting at 66% dosing  - Repeat voriconazole 6/3/2025 PENDING  - Transaminitis due to voriconazole toxicity as below     4) Pulmonary CMV Infection:              - BAL at [facility] also demonstrating pulmonary and CMV              - Continue valganciclovir 450 mg daily     5) Complete Blood Count:  - CBC with WBC 6.2, Hgb 12.5, platelets 132  - ANC 3930, ALC 1930, abs mono 190  - Transfuse irradiated PRBC for Hgb<7 g/dL or symptomatic.   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 No clinical lesions  - Chlorhexidine mouthwash 4 times  daily  - Levofloxacin 500 mg PO daily  - Valganciclovir as above  - Voriconazole as above     - Seen by Dr. Carranza 5/19/2025 - no evidence of retinitis     7) History of Rothia mucilaginosa Bacteremia:     8) FEN/GI:  - Regular diet     9) Nodule, Right Medial Thigh: (IMPROVED)        10) At Risk For Nausea and Vomiting Secondary To Therapy             - Zofran changed to PRN              - Scopolamine patch every 3 days             - Ativan IV PRN for breakthrough N/V available     11) Transaminitis Secondary to Voriconazole Toxicity: (IMPROVED)  - ,   - Total Bilirubin 0.5     12) At Risk For Hypovitaminosis D             - Continue Vitamin D3 supplement daily     13) Central Access:  - Double-lumen CVL placed 2/28/2025 by surgery  - Saline flush per protocol     14) Psychosocial:  - Dr. Ortega following     15) Social:              - Referred to Social Work and financial navigator     Disposition: To OR for Bone Marrow Aspirate     Pepe Faye MD  Pediatric Hematology / Oncology  [facility]  Cell.  [phone]  Office. [phone]

## 2025-06-04 NOTE — ANESTHESIA POSTPROCEDURE EVALUATION
Patient: Tomas Jean-Baptiste    Procedure Summary       Date: 06/04/25 Room / Location: [facility] ROOM 23 / SURGERY SAME DAY [facility]    Anesthesia Start: 1100 Anesthesia Stop: 1133    Procedure: BONE MARROW BIOPSY AND ASPIRATE - DR. FAYE (Left: Back) Diagnosis: (B-CELL LYMPHOBLASTIC LEUKEMIA)    Surgeons: Pepe Faye M.D. Responsible Provider: Joao Pino M.D.    Anesthesia Type: MAC ASA Status: 3            Final Anesthesia Type: MAC  Last vitals  BP   Blood Pressure: 102/55    Temp   36.3 °C (97.4 °F)    Pulse   100   Resp   13    SpO2   100 %      Anesthesia Post Evaluation    Patient location during evaluation: PACU  Patient participation: complete - patient participated  Level of consciousness: awake and alert  Pain score: 0    Airway patency: patent  Anesthetic complications: no  Cardiovascular status: hemodynamically stable  Respiratory status: acceptable  Hydration status: euvolemic    PONV: none          No notable events documented.     Nurse Pain Score: 0 (NPRS)

## 2025-06-04 NOTE — ANESTHESIA PREPROCEDURE EVALUATION
Case: [id number] Date/Time: 06/04/25 1045    Procedure: BONE MARROW BIOPSY AND ASPIRATE - DR. FAYE    Anesthesia type: General    Pre-op diagnosis: B-CELL LYMPHOBLASTIC LEUKEMIA    Location: [facility] ROOM 23 / SURGERY SAME DAY [facility]    Surgeons: Pepe Faye M.D.            Relevant Problems   Other   (positive) Acute lymphoblastic leukemia (ALL) in relapse (HCC)   (positive) Acute lymphoid leukemia in remission (HCC)   (positive) B lymphoblastic leukemia with t(9;22)(q34;q11.2);BCR-ABL1 ([facility])   (positive) [place] chromosome positive acute lymphoblastic leukemia (HCC)       Physical Exam    Airway   Mallampati: II  TM distance: >3 FB  Neck ROM: full       Cardiovascular - normal exam  Rhythm: regular  Rate: normal    (-) murmur     Dental - normal exam           Pulmonary - normal examBreath sounds clear to auscultation     Abdominal    Neurological - normal exam                   Anesthesia Plan    ASA 3 (leukemia, currently in treatment)   ASA physical status 3 criteria: other (comment)    Plan - MAC               Induction: intravenous    Postoperative Plan: Postoperative administration of opioids is intended.    Pertinent diagnostic labs and testing reviewed    Informed Consent:    Anesthetic plan and risks discussed with patient.    Use of blood products discussed with: patient whom consented to blood products.            Yes

## 2025-06-04 NOTE — ANESTHESIA TIME REPORT
Anesthesia Start and Stop Event Times       Date Time Event    6/4/2025 1031 Ready for Procedure     1100 Anesthesia Start     1133 Anesthesia Stop          Responsible Staff  06/04/25      Name Role Begin End    Joao Pino M.D. Anesth 1100 1133          Overtime Reason:  no overtime (within assigned shift)    Comments:

## 2025-06-04 NOTE — OP REPORT
Pediatric Oncology Bone Marrow Aspirate  Procedure Note      Patient Name:  Tomas Jean-Baptiste  : 2000  MRN: [id number]    Date of Service: 2025  Time: 11:15 AM    Procedure Performed By: Pepe Faye MD    Location of Procedure:  [place] [id number]    Pre-procedural Diagnosis: Ph+ Acute B-Lymphoblastic Leukemia in remission    Post-procedural Diagnosis: Ph+ Acute B-Lymphoblastic Leukemia in remission    Procedure:  Bone Marrow Aspiration ONLY    Anesthesia:  Per Dr. Pino in OR    Needle Size:  15 gauge I-type bone marrow aspiration needle    Site: Left Posterior Superior Iliac Crest    Complications:  None    Bleeding:  None    Procedure Note:    Tomas Jean-Baptiste is a 24 y.o. male who presented today for scheduled pre-transplant bone marrow evaluation (see H&P).  Prior to the procedure, the risks and benefits were discussed with the patient.  Consent for the procedure was signed by patient himself at bedside and placed in the patient's chart.  All pertinent labs and history were reviewed and a complete physical examination performed prior to the procedure.  He was then brought back to the OR.   A time-out was performed and the patient identified by name,  and medical record number.  Sterile gloves and mask were worn during the entire procedure.  The patient was prepped and draped in the usual sterile fashion and the site was cleansed with povoiodine (Betadine).    The patient was placed in prone position.   All bony landmarks were palpated including both iliac crests and vertebral bodies.  The subcutaneous tissue and periosteum of the left superior posterior iliac crest were infilrated with lidocaine.  An initial insertion was made with a 15 gauge I-type aspirate needle and resulted freely flowing marrow for slides as well as adequate specimen for send out flow cytometry for MRD, NGS (ClonoSEQ) and BCR-ABL RT PCR (p190) QUANT. The patient tolerated the procedure without  complications and without bleeding.      Results:    PENDING    Pepe Faye MD  Pediatric Hematology / Oncology  [facility]  Cell.  [phone]

## 2025-06-04 NOTE — OR NURSING
1130- Pt to PACU 1 from OR. Bedside report from anesthesiologist and RN. Attached to monitoring, VSS, breathing is calm and unlabored on 8L mask, pt is prone, ok to leave him prone per Anesthesia, sit to L hip CDI with gauze and tegaderm in place.     1145- Pt awake, no reports of pain or nausea, tolerating PO, pt updated his girlfriend. Pt's VSS on RA. Pt's girlfriend is unable to pick him up until 3pm.     1300- Labs drawn and sent out per Dr. Faye's order. Reviewed discharge instructions with pt. Pt has been through this procedure bf and appropriate. All instructions acknowledged, all questions answered.     1325- Pt's gf here to pick him up, pt dressed, pt escorted off the unit in wheelchair with belongings, VSS on RA. IV dc'd.

## 2025-06-04 NOTE — DISCHARGE INSTRUCTIONS
BONE MARROW ASPIRATION & BIOPSY DISCHARGE INSTRUCTIONS    After the numbing medicine wears off, you may feel some discomfort.    Keep bandage clean and dry for 24 hours.  After this time, you can change the bandage.  You may now bathe or shower.    If bleeding occurs after your bone marrow aspiration or biopsy, apply pressure to the area and call your doctor immediately.    Call your doctor if pain persists for greater than 24 hours in the area where you had your aspiration or biopsy.    Call your doctor immediately if you notice redness or drainage in the area or if you have a fever.    Call your doctor if you have numbness or weakness in the area where the doctor took the bone marrow or down your leg.    Do not drive or drink alcohol for 24 hours if you have had sedation medication.  The medication will make you drowsy.    Resume your regular diet.    Follow up with your doctor.    Additional instructions:     Prescriptions:     What to Expect Post Anesthesia    Rest and take it easy for the first 24 hours.  A responsible adult is recommended to remain with you during that time.  It is normal to feel sleepy.  We encourage you to not do anything that requires balance, judgment or coordination.    FOR 24 HOURS DO NOT:  Drive, operate machinery or run household appliances.  Drink beer or alcoholic beverages.  Make important decisions or sign legal documents.    To avoid nausea, slowly advance diet as tolerated, avoiding spicy or greasy foods for the first day.  Add more substantial food to your diet according to your provider's instructions.  Babies can be fed formula or breast milk as soon as they are hungry.  INCREASE FLUIDS AND FIBER TO AVOID CONSTIPATION.    MILD FLU-LIKE SYMPTOMS ARE NORMAL.  YOU MAY EXPERIENCE GENERALIZED MUSCLE ACHES, THROAT IRRITATION, HEADACHE AND/OR SOME NAUSEA.    Last pain medication given:          I acknowledge receipt and understanding of these Home Care Instructions.

## 2025-06-05 ENCOUNTER — HOSPITAL ENCOUNTER (OUTPATIENT)
Dept: RADIOLOGY | Facility: MEDICAL CENTER | Age: 25
End: 2025-06-05
Attending: PEDIATRICS
Payer: COMMERCIAL

## 2025-06-05 ENCOUNTER — APPOINTMENT (OUTPATIENT)
Dept: ONCOLOGY | Facility: MEDICAL CENTER | Age: 25
End: 2025-06-05
Payer: COMMERCIAL

## 2025-06-05 ENCOUNTER — OUTPATIENT INFUSION SERVICES (OUTPATIENT)
Dept: ONCOLOGY | Facility: MEDICAL CENTER | Age: 25
End: 2025-06-05
Attending: PEDIATRICS
Payer: COMMERCIAL

## 2025-06-05 ENCOUNTER — APPOINTMENT (OUTPATIENT)
Dept: ADMISSIONS | Facility: MEDICAL CENTER | Age: 25
End: 2025-06-05
Attending: PEDIATRICS
Payer: COMMERCIAL

## 2025-06-05 VITALS
TEMPERATURE: 98 F | SYSTOLIC BLOOD PRESSURE: 139 MMHG | HEART RATE: 98 BPM | RESPIRATION RATE: 17 BRPM | OXYGEN SATURATION: 99 % | DIASTOLIC BLOOD PRESSURE: 82 MMHG

## 2025-06-05 DIAGNOSIS — Z76.82 BONE MARROW TRANSPLANT CANDIDATE: ICD-10-CM

## 2025-06-05 DIAGNOSIS — B44.9 ASPERGILLUS (HCC): ICD-10-CM

## 2025-06-05 DIAGNOSIS — C91.Z0 B LYMPHOBLASTIC LEUKEMIA WITH T(9;22)(Q34;Q11.2);BCR-ABL1 (HCC): Primary | ICD-10-CM

## 2025-06-05 PROCEDURE — 700117 HCHG RX CONTRAST REV CODE 255: Performed by: PEDIATRICS

## 2025-06-05 PROCEDURE — 71260 CT THORAX DX C+: CPT

## 2025-06-05 PROCEDURE — 99211 OFF/OP EST MAY X REQ PHY/QHP: CPT

## 2025-06-05 RX ADMIN — IOHEXOL 75 ML: 350 INJECTION, SOLUTION INTRAVENOUS at 08:32

## 2025-06-06 ENCOUNTER — OUTPATIENT INFUSION SERVICES (OUTPATIENT)
Dept: ONCOLOGY | Facility: MEDICAL CENTER | Age: 25
End: 2025-06-06
Attending: PEDIATRICS
Payer: COMMERCIAL

## 2025-06-06 VITALS
DIASTOLIC BLOOD PRESSURE: 78 MMHG | SYSTOLIC BLOOD PRESSURE: 120 MMHG | HEIGHT: 65 IN | HEART RATE: 94 BPM | BODY MASS INDEX: 23.54 KG/M2 | OXYGEN SATURATION: 96 % | TEMPERATURE: 98.1 F | WEIGHT: 141.31 LBS | RESPIRATION RATE: 17 BRPM

## 2025-06-06 DIAGNOSIS — C91.Z0 B LYMPHOBLASTIC LEUKEMIA WITH T(9;22)(Q34;Q11.2);BCR-ABL1 (HCC): Primary | ICD-10-CM

## 2025-06-06 LAB
ACUTE LEUKEMIA MARKERS SPEC-IMP: NORMAL
ALBUMIN SERPL BCP-MCNC: 3.9 G/DL (ref 3.2–4.9)
ALBUMIN/GLOB SERPL: 1.6 G/DL
ALP SERPL-CCNC: 246 U/L (ref 30–99)
ALT SERPL-CCNC: 123 U/L (ref 2–50)
ANION GAP SERPL CALC-SCNC: 12 MMOL/L (ref 7–16)
ANISOCYTOSIS BLD QL SMEAR: ABNORMAL
AST SERPL-CCNC: 160 U/L (ref 12–45)
BASOPHILS # BLD AUTO: 0 % (ref 0–1.8)
BASOPHILS # BLD: 0 K/UL (ref 0–0.12)
BILIRUB SERPL-MCNC: 0.5 MG/DL (ref 0.1–1.5)
BUN SERPL-MCNC: 5 MG/DL (ref 8–22)
CALCIUM ALBUM COR SERPL-MCNC: 8.9 MG/DL (ref 8.5–10.5)
CALCIUM SERPL-MCNC: 8.8 MG/DL (ref 8.5–10.5)
CHLORIDE SERPL-SCNC: 105 MMOL/L (ref 96–112)
CO2 SERPL-SCNC: 23 MMOL/L (ref 20–33)
CREAT SERPL-MCNC: 0.55 MG/DL (ref 0.5–1.4)
EOSINOPHIL # BLD AUTO: 0.07 K/UL (ref 0–0.51)
EOSINOPHIL NFR BLD: 1.8 % (ref 0–6.9)
ERYTHROCYTE [DISTWIDTH] IN BLOOD BY AUTOMATED COUNT: 76 FL (ref 35.9–50)
EVENTS COUNTED SPEC: 10 MARKERS
GFR SERPLBLD CREATININE-BSD FMLA CKD-EPI: 142 ML/MIN/1.73 M 2
GLOBULIN SER CALC-MCNC: 2.5 G/DL (ref 1.9–3.5)
GLUCOSE SERPL-MCNC: 104 MG/DL (ref 65–99)
HBV CORE AB SERPL QL IA: NONREACTIVE
HBV SURFACE AB SERPL IA-ACNC: <3.5 MIU/ML (ref 0–10)
HBV SURFACE AG SER QL: NORMAL
HCT VFR BLD AUTO: 33.5 % (ref 42–52)
HCV AB SER QL: NORMAL
HGB BLD-MCNC: 10.5 G/DL (ref 14–18)
HIV 1+2 AB+HIV1 P24 AG SERPL QL IA: NORMAL
HYPOCHROMIA BLD QL SMEAR: ABNORMAL
LYMPHOCYTES # BLD AUTO: 1.84 K/UL (ref 1–4.8)
LYMPHOCYTES NFR BLD: 46 % (ref 22–41)
MACROCYTES BLD QL SMEAR: ABNORMAL
MANUAL DIFF BLD: NORMAL
MCH RBC QN AUTO: 29.2 PG (ref 27–33)
MCHC RBC AUTO-ENTMCNC: 31.3 G/DL (ref 32.3–36.5)
MCV RBC AUTO: 93.3 FL (ref 81.4–97.8)
METAMYELOCYTES NFR BLD MANUAL: 0.9 %
MICROCYTES BLD QL SMEAR: ABNORMAL
MONOCYTES # BLD AUTO: 0 K/UL (ref 0–0.85)
MONOCYTES NFR BLD AUTO: 0 % (ref 0–13.4)
MORPHOLOGY BLD-IMP: NORMAL
NEUTROPHILS # BLD AUTO: 2.05 K/UL (ref 1.82–7.42)
NEUTROPHILS NFR BLD: 51.3 % (ref 44–72)
NRBC # BLD AUTO: 0 K/UL
NRBC BLD-RTO: 0 /100 WBC (ref 0–0.2)
OVALOCYTES BLD QL SMEAR: NORMAL
PLATELET # BLD AUTO: 145 K/UL (ref 164–446)
PLATELET BLD QL SMEAR: NORMAL
PMV BLD AUTO: 8.7 FL (ref 9–12.9)
POIKILOCYTOSIS BLD QL SMEAR: NORMAL
POLYCHROMASIA BLD QL SMEAR: NORMAL
POTASSIUM SERPL-SCNC: 3.8 MMOL/L (ref 3.6–5.5)
PROT SERPL-MCNC: 6.4 G/DL (ref 6–8.2)
RBC # BLD AUTO: 3.59 M/UL (ref 4.7–6.1)
RBC BLD AUTO: PRESENT
SODIUM SERPL-SCNC: 140 MMOL/L (ref 135–145)
T PALLIDUM AB SER QL IA: NORMAL
WBC # BLD AUTO: 4 K/UL (ref 4.8–10.8)

## 2025-06-06 PROCEDURE — 86780 TREPONEMA PALLIDUM: CPT

## 2025-06-06 PROCEDURE — 86694 HERPES SIMPLEX NES ANTBDY: CPT

## 2025-06-06 PROCEDURE — 86706 HEP B SURFACE ANTIBODY: CPT

## 2025-06-06 PROCEDURE — 87340 HEPATITIS B SURFACE AG IA: CPT

## 2025-06-06 PROCEDURE — 86803 HEPATITIS C AB TEST: CPT

## 2025-06-06 PROCEDURE — 86663 EPSTEIN-BARR ANTIBODY: CPT

## 2025-06-06 PROCEDURE — 86777 TOXOPLASMA ANTIBODY: CPT

## 2025-06-06 PROCEDURE — 700101 HCHG RX REV CODE 250: Performed by: PEDIATRICS

## 2025-06-06 PROCEDURE — 86787 VARICELLA-ZOSTER ANTIBODY: CPT

## 2025-06-06 PROCEDURE — 87538 HIV-2 PROBE&REVRSE TRNSCRIPJ: CPT

## 2025-06-06 PROCEDURE — 80053 COMPREHEN METABOLIC PANEL: CPT

## 2025-06-06 PROCEDURE — 86665 EPSTEIN-BARR CAPSID VCA: CPT

## 2025-06-06 PROCEDURE — 86695 HERPES SIMPLEX TYPE 1 TEST: CPT

## 2025-06-06 PROCEDURE — 86790 VIRUS ANTIBODY NOS: CPT

## 2025-06-06 PROCEDURE — 87535 HIV-1 PROBE&REVERSE TRNSCRPJ: CPT

## 2025-06-06 PROCEDURE — 87522 HEPATITIS C REVRS TRNSCRPJ: CPT

## 2025-06-06 PROCEDURE — 86704 HEP B CORE ANTIBODY TOTAL: CPT

## 2025-06-06 PROCEDURE — 86664 EPSTEIN-BARR NUCLEAR ANTIGEN: CPT

## 2025-06-06 PROCEDURE — 86645 CMV ANTIBODY IGM: CPT

## 2025-06-06 PROCEDURE — 87517 HEPATITIS B DNA QUANT: CPT

## 2025-06-06 PROCEDURE — G0498 CHEMO EXTEND IV INFUS W/PUMP: HCPCS

## 2025-06-06 PROCEDURE — 86696 HERPES SIMPLEX TYPE 2 TEST: CPT

## 2025-06-06 PROCEDURE — 86644 CMV ANTIBODY: CPT

## 2025-06-06 PROCEDURE — 85007 BL SMEAR W/DIFF WBC COUNT: CPT

## 2025-06-06 PROCEDURE — 85027 COMPLETE CBC AUTOMATED: CPT

## 2025-06-06 PROCEDURE — 87389 HIV-1 AG W/HIV-1&-2 AB AG IA: CPT

## 2025-06-06 PROCEDURE — 86778 TOXOPLASMA ANTIBODY IGM: CPT

## 2025-06-06 PROCEDURE — 700111 HCHG RX REV CODE 636 W/ 250 OVERRIDE (IP): Mod: TB | Performed by: PEDIATRICS

## 2025-06-06 RX ADMIN — SODIUM CHLORIDE 112 MCG: 9 INJECTION INTRAMUSCULAR; INTRAVENOUS; SUBCUTANEOUS at 16:40

## 2025-06-06 RX ADMIN — HEPARIN 300 UNITS: 100 SYRINGE at 16:35

## 2025-06-06 ASSESSMENT — FIBROSIS 4 INDEX: FIB4 SCORE: 3.29

## 2025-06-06 NOTE — PROGRESS NOTES
"Pharmacy Chemotherapy Verification Note:    Dx: relapsed B-ALL (ph+)        Protocol: Blincyto + TKI Consolidation     Blinatumomab (Blincyto) 28 mcg IV continuous infusion over 24 hrs daily on Days 1-28  Dasatinib 140 mg PO daily on Days 1-42 (or ponatinib 15 mg PO daily on Days 1-42)   - on Dasatinib 70 mg BID since induction (POM)  42-day cycle until transplant, disease progression, or unacceptable toxicity  NCCN Guidelines for ALL. V.2.2024.  Meg DUMAS et al. NEJM. 2020;383(17):1613-23.  Richard SANDERS et al. Lancet Haematol. 2023;10(1):e24-e34.    CNS ppx:  Methotrexate 12 - 15 mg intrathecal or intraventricular with or without hydrocortisone 50 mg  NCCN Guidelines® for Acute Lymphoblastic Leukemia V.1.2023.  Valarie DUMSA et al. Farm Hosp. 2017;41(n01):105-129.b  Richard HANSEN , et al. Conte Clin Proc. 2005;80(11):1517-27.c  [facility] . Hematology Am Soc Hematol Educ Program. 2006:142-6.c    Allergies:  Amoxicillin     /78   Pulse 94   Temp 36.7 °C (98.1 °F) (Temporal)   Resp 17   Ht 1.64 m (5' 4.57\")   Wt 64.1 kg (141 lb 5 oz)   SpO2 96%   BMI 23.83 kg/m²  Body surface area is 1.71 meters squared.     6/6/25 - Per Dr. Faye, administer blincyto for an additional 4 days then discontinue for stem cell transplant on 6/11/25.    Drug Order   (Drug name, dose, route, IV Fluid & volume, frequency, number of doses) Cycle: 2 Days 18-21  Previous treatment: C1 - last bag charted on 5/6/25, stopped early per transplant team, then transplant delayed for pulmonary infectious workup.      Medication = blinatumomab  Base Dose = 28 mcg/day  Calc Dose: Base Dose x 4 day = 112 mcg  Final Dose = 112 mcg  Route = CIVI  Fluid & Volume =  mL (+ overfill = 200 mL)  Admin Duration = Over 96 hours via CADD pump @ 1.8 mL/hr   Days 1-28  Bag size may change to accommodate up to 7 day infusion       <10% difference, okay to treat with final dose   By my signature below, I confirm this process was performed independently with " the BSA and all final chemotherapy dosing calculations congruent. I have reviewed the above chemotherapy order and that my calculation of the final dose and BSA (when applicable) corroborate those calculations of the  pharmacist.     Tamir England, PharmD

## 2025-06-06 NOTE — PROGRESS NOTES
Chemotherapy Verification - PRIMARY RN      Height = 1.64m  Weight = 64.1kg  BSA = 1.71m2       Medication: blinatumomab  Dose: 28mcg/day x 4 days  Calculated Dose: 112 mcg                             (In mg/m2, AUC, mg/kg)           I confirm this process was performed independently with the BSA and all final chemotherapy dosing calculations congruent.  Any discrepancies of 10% or greater have been addressed with the chemotherapy pharmacist. The resolution of the discrepancy has been documented in the EPIC progress notes.

## 2025-06-06 NOTE — PROGRESS NOTES
JULY arrives to [facility] for a line dressing change due to peeling edges. Patient denies acute health concerns today. Left chest dual lumen Broviac with two functional lumens: one lumen continuing to infuse Blincyto and one free lumen clamped. No flushing performed on lines-- free lumen to be flushed and heparin locked tomorrow during appointment. Dressing changed per sterile protocol. Patient has his next appointment. Discharged home to self care in no apparent distress.

## 2025-06-06 NOTE — PROGRESS NOTES
Chemotherapy Verification - SECONDARY RN       Height = 1.64m  Weight = 64.1kg  BSA = 1.71m2       Medication: blinatumomab  Dose: 28mcg for 4 days  Calculated Dose: 112mcg                             (In mg/m2, AUC, mg/kg)       I confirm that this process was performed independently.

## 2025-06-07 LAB — VORICONAZOLE SERPL-MCNC: 3.2 UG/ML (ref 1–6)

## 2025-06-07 NOTE — PROGRESS NOTES
JULY presents to infusion for Blincyto CADD pump exchange and pre-transplant labs.     Discussed POC with Dr. Faye and patient, tentative plan is that patient to be connected to 4 day Blincyto pump today and disconnected on 6/10, patient will then present to [facility] on 6/13 for CVC dressing change and check-in and SCT to be done 6/15 pending chest CT results.     Patient reports feeling better today than he did last week, still reports loss of appetite.     Ensured that prescribed volume had been infused prior to disconnecting CADD pump.     10ml blood aspirated from both lumens of CVC prior to flushing both lumens and changing claves.    Pre-transplant labs drawn from line 1 and line 1 flushed with NS and heparin locked per protocol.     Line 2 flushed with NS and patient connected to 4-day Blincyto CADD pump. Settings confirmed and ensured all clamps were open and pump set to run prior to patient departure.     Patient left in stable condition, knows when to return for next appt.

## 2025-06-08 LAB — HSV1+2 IGG SER IA-ACNC: >22.4 IV

## 2025-06-09 ENCOUNTER — HOSPITAL ENCOUNTER (OUTPATIENT)
Dept: PEDIATRIC HEMATOLOGY/ONCOLOGY | Facility: MEDICAL CENTER | Age: 25
End: 2025-06-09
Attending: PEDIATRICS
Payer: COMMERCIAL

## 2025-06-09 DIAGNOSIS — Z76.82 BONE MARROW TRANSPLANT CANDIDATE: Primary | ICD-10-CM

## 2025-06-09 DIAGNOSIS — Z76.82 BONE MARROW TRANSPLANT CANDIDATE: ICD-10-CM

## 2025-06-09 DIAGNOSIS — C91.02 ACUTE LYMPHOBLASTIC LEUKEMIA (ALL) IN RELAPSE (HCC): Primary | ICD-10-CM

## 2025-06-09 LAB
ALBUMIN SERPL BCP-MCNC: 4.2 G/DL (ref 3.2–4.9)
ALBUMIN/GLOB SERPL: 1.8 G/DL
ALP SERPL-CCNC: 281 U/L (ref 30–99)
ALT SERPL-CCNC: 90 U/L (ref 2–50)
ANION GAP SERPL CALC-SCNC: 14 MMOL/L (ref 7–16)
AST SERPL-CCNC: 132 U/L (ref 12–45)
BASOPHILS # BLD AUTO: 0.9 % (ref 0–1.8)
BASOPHILS # BLD: 0.03 K/UL (ref 0–0.12)
BILIRUB SERPL-MCNC: 0.4 MG/DL (ref 0.1–1.5)
BUN SERPL-MCNC: 6 MG/DL (ref 8–22)
CALCIUM ALBUM COR SERPL-MCNC: 8.8 MG/DL (ref 8.5–10.5)
CALCIUM SERPL-MCNC: 9 MG/DL (ref 8.5–10.5)
CHLORIDE SERPL-SCNC: 104 MMOL/L (ref 96–112)
CMV IGG SERPL IA-ACNC: >10 U/ML
CMV IGM SERPL IA-ACNC: <8 AU/ML
CO2 SERPL-SCNC: 22 MMOL/L (ref 20–33)
CREAT SERPL-MCNC: 0.58 MG/DL (ref 0.5–1.4)
EBV EA-D IGG SER-ACNC: <5 U/ML (ref 0–10.9)
EBV NA IGG SER IA-ACNC: 187 U/ML (ref 0–21.9)
EBV VCA IGG SER IA-ACNC: 34.9 U/ML (ref 0–21.9)
EBV VCA IGM SER IA-ACNC: <10 U/ML (ref 0–43.9)
EOSINOPHIL # BLD AUTO: 0.09 K/UL (ref 0–0.51)
EOSINOPHIL NFR BLD: 2.8 % (ref 0–6.9)
ERYTHROCYTE [DISTWIDTH] IN BLOOD BY AUTOMATED COUNT: 79.1 FL (ref 35.9–50)
GFR SERPLBLD CREATININE-BSD FMLA CKD-EPI: 140 ML/MIN/1.73 M 2
GLOBULIN SER CALC-MCNC: 2.3 G/DL (ref 1.9–3.5)
GLUCOSE SERPL-MCNC: 109 MG/DL (ref 65–99)
HCT VFR BLD AUTO: 34.3 % (ref 42–52)
HGB BLD-MCNC: 10.9 G/DL (ref 14–18)
HSV1 GG IGG SER-ACNC: 38.8 IV
HSV2 GG IGG SER-ACNC: 0.03 IV
HTLV I+II AB PATRN SER IB-IMP: NEGATIVE
IMM GRANULOCYTES # BLD AUTO: 0.08 K/UL (ref 0–0.11)
IMM GRANULOCYTES NFR BLD AUTO: 2.5 % (ref 0–0.9)
LYMPHOCYTES # BLD AUTO: 1.76 K/UL (ref 1–4.8)
LYMPHOCYTES NFR BLD: 55 % (ref 22–41)
MCH RBC QN AUTO: 29.8 PG (ref 27–33)
MCHC RBC AUTO-ENTMCNC: 31.8 G/DL (ref 32.3–36.5)
MCV RBC AUTO: 93.7 FL (ref 81.4–97.8)
MONOCYTES # BLD AUTO: 0.14 K/UL (ref 0–0.85)
MONOCYTES NFR BLD AUTO: 4.4 % (ref 0–13.4)
NEUTROPHILS # BLD AUTO: 1.1 K/UL (ref 1.82–7.42)
NEUTROPHILS NFR BLD: 34.4 % (ref 44–72)
NRBC # BLD AUTO: 0 K/UL
NRBC BLD-RTO: 0 /100 WBC (ref 0–0.2)
PLATELET # BLD AUTO: 159 K/UL (ref 164–446)
PMV BLD AUTO: 7.7 FL (ref 9–12.9)
POTASSIUM SERPL-SCNC: 3.5 MMOL/L (ref 3.6–5.5)
PROT SERPL-MCNC: 6.5 G/DL (ref 6–8.2)
RBC # BLD AUTO: 3.66 M/UL (ref 4.7–6.1)
SODIUM SERPL-SCNC: 140 MMOL/L (ref 135–145)
T GONDII IGG SER-ACNC: <3 IU/ML
T GONDII IGM SER-ACNC: <3 AU/ML
VZV IGG SER IA-ACNC: 0.14 S/CO
WBC # BLD AUTO: 3.2 K/UL (ref 4.8–10.8)

## 2025-06-09 PROCEDURE — 85025 COMPLETE CBC W/AUTO DIFF WBC: CPT

## 2025-06-09 PROCEDURE — 36415 COLL VENOUS BLD VENIPUNCTURE: CPT

## 2025-06-09 PROCEDURE — 80053 COMPREHEN METABOLIC PANEL: CPT

## 2025-06-09 PROCEDURE — 87305 ASPERGILLUS AG IA: CPT

## 2025-06-09 NOTE — PROGRESS NOTES
Pt to [facility] for lab draw. Labs drawn from the right ac without difficulty / with 1 attempt.   Pt tolerated well.         Labs sent to [facility].

## 2025-06-10 ENCOUNTER — OUTPATIENT INFUSION SERVICES (OUTPATIENT)
Dept: ONCOLOGY | Facility: MEDICAL CENTER | Age: 25
End: 2025-06-10
Attending: PEDIATRICS
Payer: COMMERCIAL

## 2025-06-10 VITALS
TEMPERATURE: 98.9 F | OXYGEN SATURATION: 99 % | DIASTOLIC BLOOD PRESSURE: 70 MMHG | SYSTOLIC BLOOD PRESSURE: 116 MMHG | HEART RATE: 103 BPM | RESPIRATION RATE: 17 BRPM

## 2025-06-10 DIAGNOSIS — C91.Z0 B LYMPHOBLASTIC LEUKEMIA WITH T(9;22)(Q34;Q11.2);BCR-ABL1 (HCC): Primary | ICD-10-CM

## 2025-06-10 LAB
CMV DNA SERPL NAA+PROBE-ACNC: NOT DETECTED IU/ML
CMV DNA SERPL NAA+PROBE-LOG IU: NOT DETECTED LOG IU/ML
CMV DNA SERPL QL NAA+PROBE: NOT DETECTED
HBV DNA SERPL NAA+PROBE-ACNC: NOT DETECTED IU/ML
HBV DNA SERPL NAA+PROBE-LOG IU: NOT DETECTED LOG IU/ML
HBV DNA SERPL QL NAA+PROBE: NOT DETECTED
HCV RNA SERPL NAA+PROBE-ACNC: NOT DETECTED IU/ML
HCV RNA SERPL NAA+PROBE-LOG IU: NOT DETECTED LOG IU/ML
HCV RNA SERPL QL NAA+PROBE: NOT DETECTED
HIV 2 RNA SERPL QL NAA+PROBE: NOT DETECTED
HIV1 RNA SERPL QL NAA+PROBE: NOT DETECTED

## 2025-06-10 PROCEDURE — 96523 IRRIG DRUG DELIVERY DEVICE: CPT

## 2025-06-10 PROCEDURE — 700111 HCHG RX REV CODE 636 W/ 250 OVERRIDE (IP): Performed by: PEDIATRICS

## 2025-06-10 RX ADMIN — HEPARIN 500 UNITS: 100 SYRINGE at 18:05

## 2025-06-10 RX ADMIN — HEPARIN 300 UNITS: 100 SYRINGE at 18:05

## 2025-06-11 ENCOUNTER — OUTPATIENT INFUSION SERVICES (OUTPATIENT)
Dept: ONCOLOGY | Facility: MEDICAL CENTER | Age: 25
End: 2025-06-11
Attending: PEDIATRICS
Payer: COMMERCIAL

## 2025-06-11 VITALS
BODY MASS INDEX: 23.99 KG/M2 | HEART RATE: 76 BPM | TEMPERATURE: 98.2 F | WEIGHT: 143.96 LBS | HEIGHT: 65 IN | RESPIRATION RATE: 16 BRPM | SYSTOLIC BLOOD PRESSURE: 129 MMHG | DIASTOLIC BLOOD PRESSURE: 79 MMHG | OXYGEN SATURATION: 97 %

## 2025-06-11 DIAGNOSIS — C91.Z0 B LYMPHOBLASTIC LEUKEMIA WITH T(9;22)(Q34;Q11.2);BCR-ABL1 (HCC): Primary | ICD-10-CM

## 2025-06-11 LAB
GALACTOMANNAN AG SERPL QL IA: NEGATIVE
GALACTOMANNAN AG SERPL-ACNC: 0.08

## 2025-06-11 PROCEDURE — 700111 HCHG RX REV CODE 636 W/ 250 OVERRIDE (IP): Mod: JW,TB | Performed by: PEDIATRICS

## 2025-06-11 PROCEDURE — A9270 NON-COVERED ITEM OR SERVICE: HCPCS | Performed by: PEDIATRICS

## 2025-06-11 PROCEDURE — 700101 HCHG RX REV CODE 250: Performed by: PEDIATRICS

## 2025-06-11 PROCEDURE — G0498 CHEMO EXTEND IV INFUS W/PUMP: HCPCS

## 2025-06-11 PROCEDURE — 700102 HCHG RX REV CODE 250 W/ 637 OVERRIDE(OP): Performed by: PEDIATRICS

## 2025-06-11 PROCEDURE — 96375 TX/PRO/DX INJ NEW DRUG ADDON: CPT

## 2025-06-11 RX ORDER — ONDANSETRON 2 MG/ML
8 INJECTION INTRAMUSCULAR; INTRAVENOUS EVERY 8 HOURS PRN
Status: CANCELLED | OUTPATIENT
Start: 2025-06-11

## 2025-06-11 RX ORDER — PROMETHAZINE HYDROCHLORIDE 25 MG/1
25 TABLET ORAL EVERY 6 HOURS PRN
Status: CANCELLED | OUTPATIENT
Start: 2025-06-11

## 2025-06-11 RX ORDER — ONDANSETRON 2 MG/ML
8 INJECTION INTRAMUSCULAR; INTRAVENOUS ONCE
Status: COMPLETED | OUTPATIENT
Start: 2025-06-11 | End: 2025-06-11

## 2025-06-11 RX ORDER — LIDOCAINE AND PRILOCAINE 25; 25 MG/G; MG/G
CREAM TOPICAL PRN
Status: CANCELLED | OUTPATIENT
Start: 2025-06-11

## 2025-06-11 RX ORDER — DEXAMETHASONE 6 MG/1
12 TABLET ORAL ONCE
Status: COMPLETED | OUTPATIENT
Start: 2025-06-11 | End: 2025-06-11

## 2025-06-11 RX ORDER — MIDAZOLAM HYDROCHLORIDE 1 MG/ML
2 INJECTION INTRAMUSCULAR; INTRAVENOUS ONCE
Status: CANCELLED | OUTPATIENT
Start: 2025-06-13 | End: 2025-06-20

## 2025-06-11 RX ORDER — LORAZEPAM 2 MG/ML
1 CONCENTRATE ORAL EVERY 6 HOURS PRN
Status: CANCELLED | OUTPATIENT
Start: 2025-06-11

## 2025-06-11 RX ORDER — DEXAMETHASONE 6 MG/1
12 TABLET ORAL ONCE
Status: CANCELLED | OUTPATIENT
Start: 2025-06-11 | End: 2025-06-18

## 2025-06-11 RX ORDER — ONDANSETRON 2 MG/ML
8 INJECTION INTRAMUSCULAR; INTRAVENOUS ONCE
Status: CANCELLED | OUTPATIENT
Start: 2025-06-11

## 2025-06-11 RX ADMIN — SODIUM CHLORIDE 196 MCG: 9 INJECTION INTRAMUSCULAR; INTRAVENOUS; SUBCUTANEOUS at 15:10

## 2025-06-11 RX ADMIN — ONDANSETRON 8 MG: 2 INJECTION INTRAMUSCULAR; INTRAVENOUS at 14:25

## 2025-06-11 RX ADMIN — DEXAMETHASONE 12 MG: 6 TABLET ORAL at 14:25

## 2025-06-11 ASSESSMENT — FIBROSIS 4 INDEX: FIB4 SCORE: 2.01

## 2025-06-11 NOTE — PROGRESS NOTES
"Pharmacy Chemotherapy Verification Note:    Dx: relapsed B-ALL (ph+)        Protocol: Blincyto + TKI Consolidation     Blinatumomab (Blincyto) 28 mcg IV continuous infusion over 24 hrs daily on Days 1-28  Dasatinib 140 mg PO daily on Days 1-42 (or ponatinib 15 mg PO daily on Days 1-42)   - on Dasatinib 70 mg BID since induction (POM)  42-day cycle until transplant, disease progression, or unacceptable toxicity  NCCN Guidelines for ALL. V.2.2024.  Meg DUMAS, et al. NEJM. 2020;383(17):1613-23.  Richard SANDERS et al. Lancet Haematol. 2023;10(1):e24-e34.    CNS ppx:  Methotrexate 12 - 15 mg intrathecal or intraventricular with or without hydrocortisone 50 mg  NCCN Guidelines® for Acute Lymphoblastic Leukemia V.1.2023.  Valarie DUMAS et al. Farm Hosp. 2017;41(n01):105-129.b  Richard HANSEN , et al. Conte Clin Proc. 2005;80(11):1517-27.c  [facility] . Hematology Am Soc Hematol Educ Program. 2006:142-6.c    Allergies:  Amoxicillin     /79   Pulse 76   Temp 36.8 °C (98.2 °F) (Temporal)   Resp 16   Ht 1.64 m (5' 4.57\")   Wt 65.3 kg (143 lb 15.4 oz)   SpO2 97%   BMI 24.28 kg/m²  Body surface area is 1.72 meters squared.     6/6/25 - Per Dr. Faye, administer blincyto for an additional 4 days then discontinue for stem cell transplant on 6/11/25 --> CANCELED, pt to receive last 7 days of Blincyto therapy    All labs (6/9/25) within treatment plan parameters.      Drug Order   (Drug name, dose, route, IV Fluid & volume, frequency, number of doses) Cycle 3, Days 22-28  Previous treatment: C1 - last bag charted on 5/6/25, stopped early per transplant team, then transplant delayed for pulmonary infectious workup.    Medication = blinatumomab  Base Dose = 28 mcg/day  Calc Dose: Base Dose x 7 day = 196 mcg  Final Dose = 196 mcg  Route = CIVI  Fluid & Volume = .8 mL (+ overfill = 108 mL)  Admin Duration = Over 96 hours via CADD pump @ 0.6 mL/hr   Days 1-28  Bag size may change to accommodate up to 7 day infusion       <10% " difference, okay to treat with final dose   By my signature below, I confirm this process was performed independently with the BSA and all final chemotherapy dosing calculations congruent. I have reviewed the above chemotherapy order and that my calculation of the final dose and BSA (when applicable) corroborate those calculations of the  pharmacist.     Kalyn Salgado, PharmD

## 2025-06-11 NOTE — PROGRESS NOTES
Chemotherapy Verification - SECONDARY RN       Height = 65.3kg  Weight = 164cm  BSA = 1.72m2       Medication: blinatumomab  Dose: 28 mcg/day x7 days (set dose)  Calculated Dose: 196mcg over 7 days                            (In mg/m2, AUC, mg/kg)       I confirm that this process was performed independently.

## 2025-06-11 NOTE — PROGRESS NOTES
Pt arrived ambulatory for pump disconnect, denies complaints, pump reading LOW, pt doesn't want to wait until pump completely empty.  Left chest DL CVC dressing changed per protocol, site appears healthy, no s/s infection noted.  Blincyto pump removed, 10cc blood waste removed, and then both lumens flushed per protocol with good blood return, heparin locked after changing caps.  Pt Dc'd home without incident, no further appts needed, will be following up at [place] for transplant.

## 2025-06-11 NOTE — PROGRESS NOTES
Chemotherapy Verification - SECONDARY RN       Height = 164 cm  Weight = 65.3 kg  BSA = 1.72 m^2       Medication: blinatumomab (BLINCYTO)  Dose: 28 mcg/day x 7 days   Calculated Dose: 196 mcg over 7 days                              (In mg/m2, AUC, mg/kg)       I confirm that this process was performed independently.

## 2025-06-12 ENCOUNTER — APPOINTMENT (OUTPATIENT)
Dept: ONCOLOGY | Facility: MEDICAL CENTER | Age: 25
End: 2025-06-12
Payer: COMMERCIAL

## 2025-06-12 NOTE — PROGRESS NOTES
JULY arrives to [facility] for a re-connection to his Blincyto CADD pump. He was a last minute add-on appointment/treatment per Dr. Faye as he will not be going to [place] this week. Left chest CVC/Broviac with two functional lumens: red lumen remains heparin locked from yesterday and white lumen to be used today. White lumen flushes easily and has brisk blood return. Premedicated with IVP Zofran and PO Dexamethasone. Blincyto CADD then connected and set to RUN mode over 168 hours with connections secured and clamps opened. Patient to return for disconnect next week. Notified patient to check [facility] for updated appointments tomorrow. Discharged home to self care in no apparent distress.

## 2025-06-13 ENCOUNTER — APPOINTMENT (OUTPATIENT)
Dept: ONCOLOGY | Facility: MEDICAL CENTER | Age: 25
End: 2025-06-13
Payer: COMMERCIAL

## 2025-06-13 LAB
PATHOLOGY STUDY: NORMAL
SPECIMEN SOURCE: NORMAL
T(9;22)(ABL1,BCR) MINOR BLD/T QL: NOT DETECTED
T(ABL1,BCR)E1A2/CONTROL BLD/T: 0 %

## 2025-06-16 ENCOUNTER — APPOINTMENT (OUTPATIENT)
Dept: ONCOLOGY | Facility: MEDICAL CENTER | Age: 25
End: 2025-06-16
Payer: COMMERCIAL

## 2025-06-17 ENCOUNTER — OUTPATIENT INFUSION SERVICES (OUTPATIENT)
Dept: ONCOLOGY | Facility: MEDICAL CENTER | Age: 25
End: 2025-06-17
Attending: PEDIATRICS
Payer: COMMERCIAL

## 2025-06-17 ENCOUNTER — HOSPITAL ENCOUNTER (OUTPATIENT)
Facility: MEDICAL CENTER | Age: 25
End: 2025-06-17
Attending: PEDIATRICS
Payer: COMMERCIAL

## 2025-06-17 ENCOUNTER — HOSPITAL ENCOUNTER (OUTPATIENT)
Dept: PEDIATRIC HEMATOLOGY/ONCOLOGY | Facility: MEDICAL CENTER | Age: 25
End: 2025-06-17
Attending: PEDIATRICS
Payer: COMMERCIAL

## 2025-06-17 ENCOUNTER — APPOINTMENT (OUTPATIENT)
Dept: ONCOLOGY | Facility: MEDICAL CENTER | Age: 25
End: 2025-06-17
Payer: COMMERCIAL

## 2025-06-17 VITALS
SYSTOLIC BLOOD PRESSURE: 111 MMHG | DIASTOLIC BLOOD PRESSURE: 64 MMHG | HEART RATE: 99 BPM | RESPIRATION RATE: 16 BRPM | TEMPERATURE: 98.5 F | OXYGEN SATURATION: 98 %

## 2025-06-17 DIAGNOSIS — Z76.82 BONE MARROW TRANSPLANT CANDIDATE: ICD-10-CM

## 2025-06-17 DIAGNOSIS — C91.Z0 B LYMPHOBLASTIC LEUKEMIA WITH T(9;22)(Q34;Q11.2);BCR-ABL1 (HCC): Primary | ICD-10-CM

## 2025-06-17 DIAGNOSIS — Z76.82 BONE MARROW TRANSPLANT CANDIDATE: Primary | ICD-10-CM

## 2025-06-17 DIAGNOSIS — C91.00 PHILADELPHIA CHROMOSOME POSITIVE ACUTE LYMPHOBLASTIC LEUKEMIA (HCC): Primary | ICD-10-CM

## 2025-06-17 LAB
ALBUMIN SERPL BCP-MCNC: 3.9 G/DL (ref 3.2–4.9)
ALBUMIN/GLOB SERPL: 1.7 G/DL
ALP SERPL-CCNC: 245 U/L (ref 30–99)
ALT SERPL-CCNC: 82 U/L (ref 2–50)
ANION GAP SERPL CALC-SCNC: 12 MMOL/L (ref 7–16)
ANISOCYTOSIS BLD QL SMEAR: ABNORMAL
AST SERPL-CCNC: 113 U/L (ref 12–45)
BASOPHILS # BLD AUTO: 0 % (ref 0–1.8)
BASOPHILS # BLD: 0 K/UL (ref 0–0.12)
BILIRUB SERPL-MCNC: 0.3 MG/DL (ref 0.1–1.5)
BUN SERPL-MCNC: 6 MG/DL (ref 8–22)
CALCIUM ALBUM COR SERPL-MCNC: 8.7 MG/DL (ref 8.5–10.5)
CALCIUM SERPL-MCNC: 8.6 MG/DL (ref 8.5–10.5)
CHLORIDE SERPL-SCNC: 106 MMOL/L (ref 96–112)
CO2 SERPL-SCNC: 24 MMOL/L (ref 20–33)
CREAT SERPL-MCNC: 0.55 MG/DL (ref 0.5–1.4)
EOSINOPHIL # BLD AUTO: 0.07 K/UL (ref 0–0.51)
EOSINOPHIL NFR BLD: 2.6 % (ref 0–6.9)
ERYTHROCYTE [DISTWIDTH] IN BLOOD BY AUTOMATED COUNT: 85.1 FL (ref 35.9–50)
GFR SERPLBLD CREATININE-BSD FMLA CKD-EPI: 142 ML/MIN/1.73 M 2
GLOBULIN SER CALC-MCNC: 2.3 G/DL (ref 1.9–3.5)
GLUCOSE SERPL-MCNC: 106 MG/DL (ref 65–99)
HCT VFR BLD AUTO: 34.9 % (ref 42–52)
HGB BLD-MCNC: 11.1 G/DL (ref 14–18)
LYMPHOCYTES # BLD AUTO: 1.16 K/UL (ref 1–4.8)
LYMPHOCYTES NFR BLD: 44.8 % (ref 22–41)
MACROCYTES BLD QL SMEAR: ABNORMAL
MANUAL DIFF BLD: NORMAL
MCH RBC QN AUTO: 30.6 PG (ref 27–33)
MCHC RBC AUTO-ENTMCNC: 31.8 G/DL (ref 32.3–36.5)
MCV RBC AUTO: 96.1 FL (ref 81.4–97.8)
MICROCYTES BLD QL SMEAR: ABNORMAL
MONOCYTES # BLD AUTO: 0.1 K/UL (ref 0–0.85)
MONOCYTES NFR BLD AUTO: 5.2 % (ref 0–13.4)
MORPHOLOGY BLD-IMP: NORMAL
MYELOCYTES NFR BLD MANUAL: 0.8 %
NEUTROPHILS # BLD AUTO: 1.21 K/UL (ref 1.82–7.42)
NEUTROPHILS NFR BLD: 44 % (ref 44–72)
NEUTS BAND NFR BLD MANUAL: 2.6 % (ref 0–10)
NRBC # BLD AUTO: 0 K/UL
NRBC BLD-RTO: 0 /100 WBC (ref 0–0.2)
PLATELET # BLD AUTO: 237 K/UL (ref 164–446)
PLATELET BLD QL SMEAR: NORMAL
PMV BLD AUTO: 7.9 FL (ref 9–12.9)
POTASSIUM SERPL-SCNC: 3.6 MMOL/L (ref 3.6–5.5)
PROT SERPL-MCNC: 6.2 G/DL (ref 6–8.2)
RBC # BLD AUTO: 3.63 M/UL (ref 4.7–6.1)
RBC BLD AUTO: PRESENT
SODIUM SERPL-SCNC: 142 MMOL/L (ref 135–145)
WBC # BLD AUTO: 2.6 K/UL (ref 4.8–10.8)

## 2025-06-17 PROCEDURE — 80285 DRUG ASSAY VORICONAZOLE: CPT

## 2025-06-17 PROCEDURE — 85007 BL SMEAR W/DIFF WBC COUNT: CPT

## 2025-06-17 PROCEDURE — 80053 COMPREHEN METABOLIC PANEL: CPT

## 2025-06-17 PROCEDURE — 99211 OFF/OP EST MAY X REQ PHY/QHP: CPT

## 2025-06-17 PROCEDURE — 36415 COLL VENOUS BLD VENIPUNCTURE: CPT

## 2025-06-17 PROCEDURE — 85027 COMPLETE CBC AUTOMATED: CPT

## 2025-06-17 NOTE — PROGRESS NOTES
Patient to \[facility]\"" for dressing change. CVC dressing changed with sterile field. CADD pump is running. Patient to return tomorrow.

## 2025-06-17 NOTE — PROGRESS NOTES
Pt to [facility] for lab draw. Labs drawn from the left ac without difficulty / with 1 attempt.   Pt tolerated well.

## 2025-06-18 ENCOUNTER — PHARMACY VISIT (OUTPATIENT)
Dept: PHARMACY | Facility: MEDICAL CENTER | Age: 25
End: 2025-06-18
Payer: COMMERCIAL

## 2025-06-18 ENCOUNTER — OUTPATIENT INFUSION SERVICES (OUTPATIENT)
Dept: ONCOLOGY | Facility: MEDICAL CENTER | Age: 25
End: 2025-06-18
Attending: PEDIATRICS
Payer: COMMERCIAL

## 2025-06-18 VITALS
HEART RATE: 86 BPM | SYSTOLIC BLOOD PRESSURE: 129 MMHG | TEMPERATURE: 98.4 F | OXYGEN SATURATION: 97 % | DIASTOLIC BLOOD PRESSURE: 69 MMHG | RESPIRATION RATE: 18 BRPM

## 2025-06-18 DIAGNOSIS — C91.02 ACUTE LYMPHOBLASTIC LEUKEMIA (ALL) IN RELAPSE (HCC): Primary | ICD-10-CM

## 2025-06-18 PROCEDURE — 99211 OFF/OP EST MAY X REQ PHY/QHP: CPT

## 2025-06-18 PROCEDURE — RXMED WILLOW AMBULATORY MEDICATION CHARGE: Performed by: PEDIATRICS

## 2025-06-18 RX ORDER — VORICONAZOLE 200 MG/1
TABLET, FILM COATED ORAL
Qty: 56 TABLET | Refills: 0 | OUTPATIENT
Start: 2025-06-18

## 2025-06-18 NOTE — PROGRESS NOTES
JULY is here today for CADD pump disconnect.     Infusion was completed. Pt reports tolerating well.     Disconnected and flushed line per protocol.   Pt will call next week if he is still in [place] for a dressing change, may be going out of town for trans plant.     Discharged in stable condition.

## 2025-06-20 ENCOUNTER — PHARMACY VISIT (OUTPATIENT)
Dept: PHARMACY | Facility: MEDICAL CENTER | Age: 25
End: 2025-06-20
Payer: COMMERCIAL

## 2025-06-20 DIAGNOSIS — B25.9 CYTOMEGALOVIRUS INFECTION, UNSPECIFIED CYTOMEGALOVIRAL INFECTION TYPE (HCC): Primary | ICD-10-CM

## 2025-06-20 LAB — VORICONAZOLE SERPL-MCNC: 3.3 UG/ML (ref 1–6)

## 2025-06-20 PROCEDURE — RXMED WILLOW AMBULATORY MEDICATION CHARGE: Performed by: PEDIATRICS

## 2025-06-20 RX ORDER — VALGANCICLOVIR 450 MG/1
900 TABLET, FILM COATED ORAL 2 TIMES DAILY
Qty: 112 TABLET | Refills: 0 | Status: SHIPPED | OUTPATIENT
Start: 2025-06-20 | End: 2025-07-18

## 2025-06-24 ENCOUNTER — OUTPATIENT INFUSION SERVICES (OUTPATIENT)
Dept: ONCOLOGY | Facility: MEDICAL CENTER | Age: 25
End: 2025-06-24
Attending: PEDIATRICS
Payer: COMMERCIAL

## 2025-06-24 VITALS
HEART RATE: 84 BPM | RESPIRATION RATE: 18 BRPM | OXYGEN SATURATION: 99 % | DIASTOLIC BLOOD PRESSURE: 76 MMHG | SYSTOLIC BLOOD PRESSURE: 115 MMHG | TEMPERATURE: 98 F

## 2025-06-24 DIAGNOSIS — C91.02 ACUTE LYMPHOBLASTIC LEUKEMIA (ALL) IN RELAPSE (HCC): Primary | ICD-10-CM

## 2025-06-24 PROCEDURE — 99211 OFF/OP EST MAY X REQ PHY/QHP: CPT

## 2025-06-24 PROCEDURE — 700111 HCHG RX REV CODE 636 W/ 250 OVERRIDE (IP): Performed by: PEDIATRICS

## 2025-06-24 RX ORDER — 0.9 % SODIUM CHLORIDE 0.9 %
10 VIAL (ML) INJECTION PRN
OUTPATIENT
Start: 2025-07-01

## 2025-06-24 RX ORDER — 0.9 % SODIUM CHLORIDE 0.9 %
10 VIAL (ML) INJECTION PRN
Status: CANCELLED | OUTPATIENT
Start: 2025-06-24

## 2025-06-24 RX ADMIN — HEPARIN 300 UNITS: 100 SYRINGE at 14:15

## 2025-06-25 NOTE — PROGRESS NOTES
JULY presents to infusion for CVC dressing change.     Both lumens of CVC flushed with 20ml NS, claves changed and heparin locked per protocol.     CVC dressing changed per protocol.     Patient reports that he will be leaving for [facility] on 6/28, reports that his next dressing change appt at [facility] is not until 7/3.     Appt made for dressing to be changed again on 6/27 prior to patient departure so that dressing will not be in place for longer than 1 week before next dressing change appt at [facility].     Patient left in stable condition.

## 2025-06-27 ENCOUNTER — OUTPATIENT INFUSION SERVICES (OUTPATIENT)
Dept: ONCOLOGY | Facility: MEDICAL CENTER | Age: 25
End: 2025-06-27
Attending: PEDIATRICS
Payer: COMMERCIAL

## 2025-06-27 VITALS
OXYGEN SATURATION: 98 % | RESPIRATION RATE: 18 BRPM | DIASTOLIC BLOOD PRESSURE: 86 MMHG | SYSTOLIC BLOOD PRESSURE: 124 MMHG | TEMPERATURE: 98 F | HEART RATE: 96 BPM

## 2025-06-27 DIAGNOSIS — C91.02 ACUTE LYMPHOBLASTIC LEUKEMIA (ALL) IN RELAPSE (HCC): Primary | ICD-10-CM

## 2025-06-27 PROCEDURE — 99211 OFF/OP EST MAY X REQ PHY/QHP: CPT

## 2025-06-27 PROCEDURE — 700111 HCHG RX REV CODE 636 W/ 250 OVERRIDE (IP): Performed by: PEDIATRICS

## 2025-06-27 RX ADMIN — HEPARIN 300 UNITS: 100 SYRINGE at 15:58

## 2025-06-27 RX ADMIN — HEPARIN 300 UNITS: 100 SYRINGE at 15:57

## 2025-06-28 NOTE — PROGRESS NOTES
JULY presents to infusion for CVC dressing change. Pt c/o of irritation and redness on outer edge of dressing.  Pt states it may be from the Mastisol.      Both lumens of CVC flushed with 20ml NS, claves changed and heparin locked per protocol.      CVC dressing changed per protocol. No Mastisol adhesive used. No future apt scheduled as pt is going to be at [place] for transplant and is unsure when pt will be back in [place].

## (undated) DEVICE — GLOVE BIOGEL PI ORTHO SZ 7 PF LF (40PR/BX)

## (undated) DEVICE — ELECTRODE 850 FOAM ADHESIVE - HYDROGEL RADIOTRNSPRNT (50/PK)

## (undated) DEVICE — GOWN WARMING STANDARD FLEX - (30/CA)

## (undated) DEVICE — COVER LIGHT HANDLE ALC PLUS DISP (18EA/BX)

## (undated) DEVICE — SET EXTENSION WITH 2 PORTS (48EA/CA) ***PART #2C8610 IS A SUBSTITUTE*****

## (undated) DEVICE — DRAPE SURGICAL U 77X120 - (10/CA)

## (undated) DEVICE — GOWN SURGEONS LARGE - (32/CA)

## (undated) DEVICE — SET LEADWIRE 5 LEAD BEDSIDE DISPOSABLE ECG (1SET OF 5/EA)

## (undated) DEVICE — CLOSURE SKIN STRIP 1/2 X 4 IN - (STERI STRIP) (50/BX 4BX/CA)

## (undated) DEVICE — SODIUM CHL IRRIGATION 0.9% 1000ML (12EA/CA)

## (undated) DEVICE — GLOVE BIOGEL PI INDICATOR SZ 7.5 SURGICAL PF LF -(50/BX 4BX/CA)

## (undated) DEVICE — BLADE SURGICAL #15 - (50/BX 3BX/CA)

## (undated) DEVICE — SHEET TRANSVERSE LAP - (12EA/CA)

## (undated) DEVICE — SENSOR OXIMETER ADULT SPO2 RD SET (20EA/BX)

## (undated) DEVICE — SPONGE GAUZESTER 4 X 4 4PLY - (128PK/CA)

## (undated) DEVICE — GLOVE SZ 6 BIOGEL PI MICRO - PF LF (50PR/BX 4BX/CA)

## (undated) DEVICE — CANNULA O2 COMFORT SOFT EAR ADULT 7 FT TUBING (50/CA)

## (undated) DEVICE — TUBE CONNECTING SUCTION - CLEAR PLASTIC STERILE 72 IN (50EA/CA)

## (undated) DEVICE — GLOVE SZ 7.5 BIOGEL PI MICRO - PF LF (50PR/BX)

## (undated) DEVICE — SLEEVE VASO DVT COMPRESSION CALF MED - (10PR/CA)

## (undated) DEVICE — BAG SPONGE COUNT 10.25 X 32 - BLUE (250/CA)

## (undated) DEVICE — TOWEL STOP TIMEOUT SAFETY FLAG (40EA/CA)

## (undated) DEVICE — SUCTION INSTRUMENT YANKAUER BULBOUS TIP W/O VENT (50EA/CA)

## (undated) DEVICE — SUTURE 3-0 VICRYL PLUS SH - 8X 18 INCH (12/BX)

## (undated) DEVICE — GLOVE BIOGEL PI ORTHO SZ 8.5 PF LF (40/BX)

## (undated) DEVICE — SYRINGE 3 CC 22 GA X 1-1/2 - NDL SAFETY (50/BX 8BX/CA) DELETE AND POINT TO PREMIER 65230

## (undated) DEVICE — GLOVE BIOGEL PI INDICATOR SZ 8.0 SURGICAL PF LF -(50/BX 4BX/CA)

## (undated) DEVICE — SUTURE GENERAL

## (undated) DEVICE — PACK MINOR BASIN - (2EA/CA)

## (undated) DEVICE — SPONGE GAUZESTER. 2X2 4-PL - (2/PK 50PK/BX 30BX/CS)

## (undated) DEVICE — DRESSING TRANSPARENT FILM TEGADERM 2.375 X 2.75"  (100EA/BX)"

## (undated) DEVICE — PAD LAP STERILE 18 X 18 - (5/PK 40PK/CA)

## (undated) DEVICE — GLOVE BIOGEL INDICATOR SZ 6.5 SURGICAL PF LTX - (50PR/BX 4BX/CA)

## (undated) DEVICE — SLEEVE, VASO, THIGH, MED

## (undated) DEVICE — CANISTER SUCTION 3000ML MECHANICAL FILTER AUTO SHUTOFF MEDI-VAC NONSTERILE LF DISP (40EA/CA)

## (undated) DEVICE — LACTATED RINGERS INJ 1000 ML - (14EA/CA 60CA/PF)

## (undated) DEVICE — SUTURE 2-0 PROLENE SH (36PK/BX)

## (undated) DEVICE — KIT  I.V. START (100EA/CA)

## (undated) DEVICE — BANDAID SHEER STRIP 3/4 IN (100EA/BX 12BX/CA)

## (undated) DEVICE — SWAB CULTURE AMIES ESWAB (50EA/PK)

## (undated) DEVICE — DRAPE U SPLIT IMP 54 X 76 - (24/CA)

## (undated) DEVICE — CANISTER SUCTION 3000ML MECHANICAL FILTER AUTO SHUTOFF MEDI-VAC NONSTERILE LF DISP  (40EA/CA)

## (undated) DEVICE — GLOVE BIOGEL SZ 6.5 SURGICAL PF LTX (50PR/BX 4BX/CA)

## (undated) DEVICE — GLOVE SZ 8 BIOGEL PI MICRO - PF LF (50PR/BX)

## (undated) DEVICE — SOD. CHL 10CC SYRINGE PREFILL - W/10 CC (30/BX)

## (undated) DEVICE — ELECTRODE DUAL RETURN W/ CORD - (50/PK)

## (undated) DEVICE — DRESSING TRANSPARENT FILM TEGADERM 2.375 X 2.75" (100EA/BX)"

## (undated) DEVICE — SYRINGE NON SAFETY 5 CC 21 GA X 1-1/2 IN (100/BX 4BX/CA)

## (undated) DEVICE — TUBING CLEARLINK DUO-VENT - C-FLO (48EA/CA)

## (undated) DEVICE — CANNULA DIVIDED ADULT CO2 - SAMPLE W/FEMALE CONNCT (25/CA)

## (undated) DEVICE — SYRINGE 10 ML CONTROL LL (25EA/BX 4BX/CA)

## (undated) DEVICE — SYRINGE 3 CC 21 GA X 1-1/2 - NDL SAFETY (50/BX 8BX/CA)

## (undated) DEVICE — ADHESIVE MASTISOL - (48/BX)

## (undated) DEVICE — GOWN SURGEONS X-LARGE - DISP. (30/CA)

## (undated) DEVICE — CANISTER SUCTION RIGID RED 1500CC (40EA/CA)

## (undated) DEVICE — GLOVE BIOGEL PI INDICATOR SZ 6.0 SURGICAL PF LF -(200PR/CA)

## (undated) DEVICE — MASK OXYGEN VNYL ADLT MED CONC WITH 7 FOOT TUBING - (50EA/CA)

## (undated) DEVICE — SUTURE 4-0 VICRYL PLUS FS-2 - 27 INCH (36/BX)

## (undated) DEVICE — GLOVE SZ 6.5 BIOGEL PI MICRO - PF LF (50PR/BX)

## (undated) DEVICE — NEEDLE NON SAFETY 25 GA X 1 1/2 IN HYPO (100EA/BX)

## (undated) DEVICE — SLEEVE VASO CALF MED - (10PR/CA)

## (undated) DEVICE — Device

## (undated) DEVICE — TOWELS CLOTH SURGICAL - (4/PK 20PK/CA)

## (undated) DEVICE — DECANTER FLD BLS - (50/CA)

## (undated) DEVICE — SHEET THYROID - (10EA/CA)

## (undated) DEVICE — GAUZE PETROLATUM 3 X 9 - VASELINE (50EA/BX 4BX/CA)

## (undated) DEVICE — CHLORAPREP 26 ML APPLICATOR - ORANGE TINT(25/CA)

## (undated) DEVICE — SOD. CHL. INJ. 0.9% 250 ML - (36/CA 50CA/PF)

## (undated) DEVICE — SYRINGE NON SAFETY 5 CC 20 GA X 1-1/2 IN (100/BX 4BX/CA)

## (undated) DEVICE — SUTURE 3-0 MONOCRYL PLUS PS-1 - 27 INCH (36/BX)

## (undated) DEVICE — DRESSING TRANSPARENT FILM TEGADERM 4 X 4.75" (50EA/BX)"

## (undated) DEVICE — DRAIN PENROSE STERILE 1/4 X - 18 IN  (25EA/BX)

## (undated) DEVICE — DRAPE C-ARM LARGE 41IN X 74 IN - (10/BX 2BX/CA)

## (undated) DEVICE — BOVIE NEEDLE TIP 3CM COLORADO

## (undated) DEVICE — SUTURE 2-0 PDS II CT-2 - (36/BX)